# Patient Record
Sex: FEMALE | Race: WHITE | NOT HISPANIC OR LATINO | Employment: UNEMPLOYED | URBAN - METROPOLITAN AREA
[De-identification: names, ages, dates, MRNs, and addresses within clinical notes are randomized per-mention and may not be internally consistent; named-entity substitution may affect disease eponyms.]

---

## 2019-07-30 ENCOUNTER — APPOINTMENT (EMERGENCY)
Dept: RADIOLOGY | Facility: HOSPITAL | Age: 54
End: 2019-07-30
Payer: MEDICARE

## 2019-07-30 ENCOUNTER — HOSPITAL ENCOUNTER (EMERGENCY)
Facility: HOSPITAL | Age: 54
Discharge: HOME | End: 2019-07-30
Attending: EMERGENCY MEDICINE | Admitting: EMERGENCY MEDICINE
Payer: MEDICARE

## 2019-07-30 VITALS
WEIGHT: 253.09 LBS | SYSTOLIC BLOOD PRESSURE: 160 MMHG | RESPIRATION RATE: 18 BRPM | HEART RATE: 94 BPM | DIASTOLIC BLOOD PRESSURE: 70 MMHG | OXYGEN SATURATION: 96 % | TEMPERATURE: 98.1 F

## 2019-07-30 DIAGNOSIS — J20.9 ACUTE BRONCHITIS: Primary | ICD-10-CM

## 2019-07-30 DIAGNOSIS — J02.9 PHARYNGITIS: ICD-10-CM

## 2019-07-30 LAB — S PYO AG THROAT QL: NEGATIVE

## 2019-07-30 PROCEDURE — 71046 X-RAY EXAM CHEST 2 VIEWS: CPT

## 2019-07-30 PROCEDURE — 87430 STREP A AG IA: CPT | Performed by: PHYSICIAN ASSISTANT

## 2019-07-30 PROCEDURE — 99283 EMERGENCY DEPT VISIT LOW MDM: CPT | Performed by: PHYSICIAN ASSISTANT

## 2019-07-30 PROCEDURE — 99283 EMERGENCY DEPT VISIT LOW MDM: CPT

## 2019-07-30 RX ORDER — AZITHROMYCIN 250 MG/1
TABLET, FILM COATED ORAL
Qty: 6 TABLET | Refills: 0 | Status: SHIPPED | OUTPATIENT
Start: 2019-07-30 | End: 2019-08-03

## 2019-07-30 NOTE — ED PROVIDER NOTES
History  Chief Complaint   Patient presents with    Cough     Patient reports starting with a cough last night as well as sore throat, is concerned because she was around someone with pneumonia; reports congestion but denies CP/SOB  Patient is a 49 y/o female with a PMH of depression, anxiety, DM who presents for evaluation of cold symptoms  She states that her symptoms started yesterday  She complains of sore throat and cough  Cough is productive of sputum  She states she has also had some tightness and wheezing  She denies history of asthma or COPD but states that she is a smoker and her doctor has her on daily Flovent and Ventolin as needed  She states she did use her rescue inhaler last night which did provide relief of her symptoms  She states she is concerned because someone in her building recently had pneumonia  She denies any fever, chest pain, shortness breath, abdominal pain, vomiting, diarrhea, urinary complaints, nasal congestion or ear pain  She has never been hospitalized or intubated for any asthma or COPD complaints  Prior to Admission Medications   Prescriptions Last Dose Informant Patient Reported? Taking? ALPRAZolam (XANAX) 0 25 mg tablet   Yes No   Sig: Take 0 25 mg by mouth daily at bedtime as needed for anxiety  Biotin 300 MCG TABS   Yes No   Sig: Take by mouth  PARoxetine (PAXIL) 30 mg tablet   Yes No   Sig: Take 30 mg by mouth 2 (two) times a day  clonazePAM (KlonoPIN) 1 mg tablet   Yes No   Sig: Take 1 mg by mouth 2 (two) times a day as needed for seizures  metFORMIN (GLUCOPHAGE) 500 mg tablet   Yes No   Sig: Take 500 mg by mouth 2 (two) times a day with meals  Facility-Administered Medications: None       History reviewed  No pertinent past medical history  Past Surgical History:   Procedure Laterality Date     SECTION      CHOLECYSTECTOMY         History reviewed  No pertinent family history    I have reviewed and agree with the history as documented  Social History     Tobacco Use    Smoking status: Current Every Day Smoker     Packs/day: 1 00    Smokeless tobacco: Never Used   Substance Use Topics    Alcohol use: No    Drug use: No        Review of Systems   Constitutional: Positive for fatigue  Negative for chills and fever  HENT: Positive for sore throat  Negative for ear pain  Respiratory: Positive for cough and wheezing  Negative for shortness of breath  Cardiovascular: Negative for chest pain  Gastrointestinal: Negative for abdominal pain, diarrhea, nausea and vomiting  Genitourinary: Negative for dysuria  Musculoskeletal: Negative for back pain and neck pain  Skin: Negative for rash  All other systems reviewed and are negative  Physical Exam  Physical Exam   Constitutional: Vital signs are normal  She appears well-developed and well-nourished  She is active  Non-toxic appearance  HENT:   Head: Normocephalic and atraumatic  Right Ear: Hearing, tympanic membrane, external ear and ear canal normal    Left Ear: Hearing, tympanic membrane, external ear and ear canal normal    Nose: Nose normal  No mucosal edema or rhinorrhea  Mouth/Throat: Uvula is midline and mucous membranes are normal  No oral lesions  No trismus in the jaw  No uvula swelling  Posterior oropharyngeal erythema present  No oropharyngeal exudate, posterior oropharyngeal edema or tonsillar abscesses  No tonsillar exudate  No sign of PTA  Handles oral secretions well without difficulty  Eyes: Conjunctivae are normal    Neck: Normal range of motion  Neck supple  Cardiovascular: Normal rate, regular rhythm and normal heart sounds  Exam reveals no gallop and no friction rub  No murmur heard  Pulmonary/Chest: Effort normal and breath sounds normal  No stridor  No respiratory distress  She has no wheezes  Abdominal: Soft  Bowel sounds are normal  She exhibits no distension  There is no tenderness  There is no guarding     Musculoskeletal: Normal range of motion  Lymphadenopathy:     She has no cervical adenopathy  Neurological: She is alert  Skin: Skin is warm and dry  Psychiatric: She has a normal mood and affect  Her behavior is normal    Nursing note and vitals reviewed  Vital Signs  ED Triage Vitals [07/30/19 1340]   Temperature Pulse Respirations Blood Pressure SpO2   98 1 °F (36 7 °C) 94 18 160/70 96 %      Temp Source Heart Rate Source Patient Position - Orthostatic VS BP Location FiO2 (%)   Temporal Monitor Sitting Right arm --      Pain Score       No Pain           Vitals:    07/30/19 1340   BP: 160/70   Pulse: 94   Patient Position - Orthostatic VS: Sitting         Visual Acuity      ED Medications  Medications - No data to display    Diagnostic Studies  Results Reviewed     Procedure Component Value Units Date/Time    Rapid Strep A Screen Only, Adults [[id number]]  (Normal) Collected:  07/30/19 1413    Lab Status:  Final result Specimen:  Throat Updated:  07/30/19 1440     Rapid Strep A Screen Negative                 XR chest 2 views   Final Result by Fausto Charles MD (07/30 4578)      No acute cardiopulmonary disease  Workstation performed: [id number]                    Procedures  Procedures       ED Course                               MDM  Number of Diagnoses or Management Options  Acute bronchitis:   Pharyngitis:   Diagnosis management comments: Will check a rapid strep and chest x-ray  Rapid strep negative  Chest x-ray reviewed by me as no active disease  Discussed results with patient  Discussed supportive care  Continue with inhalers at home as directed  Will cover for acute exacerbation of bronchitis with [facility]  Follow up with her family doctor  Return to ED if symptoms worsen or new symptoms arise  Patient states understanding and agrees with plan         Amount and/or Complexity of Data Reviewed  Clinical lab tests: ordered and reviewed  Tests in the radiology section of CPT®: ordered and reviewed  Independent visualization of images, tracings, or specimens: yes    Patient Progress  Patient progress: stable      Disposition  Final diagnoses:   Acute bronchitis   Pharyngitis     Time reflects when diagnosis was documented in both MDM as applicable and the Disposition within this note     Time User Action Codes Description Comment    7/30/2019  2:52 PM Clay Smith Add [J20 9] Acute bronchitis     7/30/2019  2:52 PM Clay Smith Add [J02 9] Pharyngitis       ED Disposition     ED Disposition Condition Date/Time Comment    Discharge Stable Tue Jul 30, 2019  2:51 PM Latosha Aguilar discharge to home/self care  Follow-up Information     Follow up With Specialties Details Why Contact Info Additional Information    Demario Ly MD Family Medicine Schedule an appointment as soon as possible for a visit in 1 day   [address]  [address] Emergency Department Emergency Medicine  If symptoms worsen [facility] [id number]  [phone] [facility], [address], [place], [address]          Discharge Medication List as of 7/30/2019  2:53 PM      START taking these medications    Details   azithromycin (ZITHROMAX) 250 mg tablet Take 2(two) tablets on day 1(one) and take 1 tablet on day 2-5, Print         CONTINUE these medications which have NOT CHANGED    Details   ALPRAZolam (XANAX) 0 25 mg tablet Take 0 25 mg by mouth daily at bedtime as needed for anxiety  , Until Discontinued, Historical Med      Biotin 300 MCG TABS Take by mouth , Until Discontinued, Historical Med      clonazePAM (KlonoPIN) 1 mg tablet Take 1 mg by mouth 2 (two) times a day as needed for seizures  , Until Discontinued, Historical Med      metFORMIN (GLUCOPHAGE) 500 mg tablet Take 500 mg by mouth 2 (two) times a day with meals  , Until Discontinued, Historical Med      PARoxetine (PAXIL) 30 mg tablet Take 30 mg by mouth 2 (two) times a day , Until Discontinued, Historical Med           No discharge procedures on file      ED Provider  Electronically Signed by           Paige Lyle PA-C  07/30/19 6178

## 2020-05-25 ENCOUNTER — APPOINTMENT (EMERGENCY)
Dept: RADIOLOGY | Facility: HOSPITAL | Age: 55
End: 2020-05-25
Payer: MEDICARE

## 2020-05-25 ENCOUNTER — HOSPITAL ENCOUNTER (EMERGENCY)
Facility: HOSPITAL | Age: 55
Discharge: HOME | End: 2020-05-25
Attending: EMERGENCY MEDICINE | Admitting: EMERGENCY MEDICINE
Payer: MEDICARE

## 2020-05-25 VITALS
DIASTOLIC BLOOD PRESSURE: 69 MMHG | WEIGHT: 247.36 LBS | SYSTOLIC BLOOD PRESSURE: 165 MMHG | TEMPERATURE: 98.1 F | RESPIRATION RATE: 20 BRPM | HEART RATE: 98 BPM | OXYGEN SATURATION: 96 %

## 2020-05-25 DIAGNOSIS — M25.512 ACUTE PAIN OF LEFT SHOULDER: Primary | ICD-10-CM

## 2020-05-25 PROCEDURE — 99284 EMERGENCY DEPT VISIT MOD MDM: CPT | Performed by: PHYSICIAN ASSISTANT

## 2020-05-25 PROCEDURE — 99283 EMERGENCY DEPT VISIT LOW MDM: CPT

## 2020-05-25 PROCEDURE — 73000 X-RAY EXAM OF COLLAR BONE: CPT

## 2024-06-28 ENCOUNTER — HOSPITAL ENCOUNTER (EMERGENCY)
Facility: HOSPITAL | Age: 59
Discharge: HOME | End: 2024-06-29
Attending: EMERGENCY MEDICINE | Admitting: EMERGENCY MEDICINE
Payer: MEDICARE

## 2024-06-28 DIAGNOSIS — F22 PARANOIA (HCC): Primary | ICD-10-CM

## 2024-06-28 DIAGNOSIS — Z00.8 ENCOUNTER FOR PSYCHOLOGICAL EVALUATION: ICD-10-CM

## 2024-06-28 PROCEDURE — 99284 EMERGENCY DEPT VISIT MOD MDM: CPT

## 2024-06-29 VITALS
OXYGEN SATURATION: 100 % | TEMPERATURE: 97.6 F | SYSTOLIC BLOOD PRESSURE: 153 MMHG | HEART RATE: 103 BPM | RESPIRATION RATE: 18 BRPM | DIASTOLIC BLOOD PRESSURE: 70 MMHG

## 2024-06-29 PROCEDURE — 99284 EMERGENCY DEPT VISIT MOD MDM: CPT | Performed by: PHYSICIAN ASSISTANT
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
## 2024-07-01 ENCOUNTER — HOSPITAL ENCOUNTER (EMERGENCY)
Facility: HOSPITAL | Age: 59
DRG: 885 | End: 2024-07-02
Attending: EMERGENCY MEDICINE
Payer: MEDICARE

## 2024-07-01 ENCOUNTER — APPOINTMENT (EMERGENCY)
Dept: CT IMAGING | Facility: HOSPITAL | Age: 59
DRG: 885 | End: 2024-07-01
Payer: MEDICARE

## 2024-07-01 DIAGNOSIS — R03.0 ELEVATED BLOOD PRESSURE READING: ICD-10-CM

## 2024-07-01 DIAGNOSIS — Z00.8 MEDICAL CLEARANCE FOR PSYCHIATRIC ADMISSION: ICD-10-CM

## 2024-07-01 DIAGNOSIS — F22 PARANOIA (PSYCHOSIS) (HCC): Primary | ICD-10-CM

## 2024-07-01 LAB
ALBUMIN SERPL BCG-MCNC: 4.7 G/DL (ref 3.5–5)
ALP SERPL-CCNC: 110 U/L (ref 34–104)
ALT SERPL W P-5'-P-CCNC: 15 U/L (ref 7–52)
ANION GAP SERPL CALCULATED.3IONS-SCNC: 9 MMOL/L (ref 4–13)
APAP SERPL-MCNC: <2 UG/ML (ref 10–20)
APTT PPP: 34 SECONDS (ref 23–37)
AST SERPL W P-5'-P-CCNC: 19 U/L (ref 13–39)
BASOPHILS # BLD AUTO: 0.06 THOUSANDS/ÂΜL (ref 0–0.1)
BASOPHILS NFR BLD AUTO: 1 % (ref 0–1)
BILIRUB SERPL-MCNC: 0.49 MG/DL (ref 0.2–1)
BUN SERPL-MCNC: 11 MG/DL (ref 5–25)
CALCIUM SERPL-MCNC: 9.9 MG/DL (ref 8.4–10.2)
CHLORIDE SERPL-SCNC: 104 MMOL/L (ref 96–108)
CO2 SERPL-SCNC: 29 MMOL/L (ref 21–32)
CREAT SERPL-MCNC: 0.99 MG/DL (ref 0.6–1.3)
EOSINOPHIL # BLD AUTO: 0 THOUSAND/ÂΜL (ref 0–0.61)
EOSINOPHIL NFR BLD AUTO: 0 % (ref 0–6)
ERYTHROCYTE [DISTWIDTH] IN BLOOD BY AUTOMATED COUNT: 16.1 % (ref 11.6–15.1)
ETHANOL SERPL-MCNC: <10 MG/DL
GFR SERPL CREATININE-BSD FRML MDRD: 63 ML/MIN/1.73SQ M
GLUCOSE SERPL-MCNC: 93 MG/DL (ref 65–140)
HCT VFR BLD AUTO: 43.2 % (ref 34.8–46.1)
HGB BLD-MCNC: 13.8 G/DL (ref 11.5–15.4)
IMM GRANULOCYTES # BLD AUTO: 0.03 THOUSAND/UL (ref 0–0.2)
IMM GRANULOCYTES NFR BLD AUTO: 0 % (ref 0–2)
INR PPP: 1.04 (ref 0.84–1.19)
LYMPHOCYTES # BLD AUTO: 2.12 THOUSANDS/ÂΜL (ref 0.6–4.47)
LYMPHOCYTES NFR BLD AUTO: 22 % (ref 14–44)
MAGNESIUM SERPL-MCNC: 2.2 MG/DL (ref 1.9–2.7)
MCH RBC QN AUTO: 28.6 PG (ref 26.8–34.3)
MCHC RBC AUTO-ENTMCNC: 31.9 G/DL (ref 31.4–37.4)
MCV RBC AUTO: 89 FL (ref 82–98)
MONOCYTES # BLD AUTO: 0.9 THOUSAND/ÂΜL (ref 0.17–1.22)
MONOCYTES NFR BLD AUTO: 9 % (ref 4–12)
NEUTROPHILS # BLD AUTO: 6.71 THOUSANDS/ÂΜL (ref 1.85–7.62)
NEUTS SEG NFR BLD AUTO: 68 % (ref 43–75)
NRBC BLD AUTO-RTO: 0 /100 WBCS
PLATELET # BLD AUTO: 334 THOUSANDS/UL (ref 149–390)
PMV BLD AUTO: 9 FL (ref 8.9–12.7)
POTASSIUM SERPL-SCNC: 4.3 MMOL/L (ref 3.5–5.3)
PROT SERPL-MCNC: 7.4 G/DL (ref 6.4–8.4)
PROTHROMBIN TIME: 13.8 SECONDS (ref 11.6–14.5)
RBC # BLD AUTO: 4.83 MILLION/UL (ref 3.81–5.12)
SALICYLATES SERPL-MCNC: <5 MG/DL (ref 3–20)
SODIUM SERPL-SCNC: 142 MMOL/L (ref 135–147)
TSH SERPL DL<=0.05 MIU/L-ACNC: 0.95 UIU/ML (ref 0.45–4.5)
WBC # BLD AUTO: 9.82 THOUSAND/UL (ref 4.31–10.16)

## 2024-07-01 PROCEDURE — 83735 ASSAY OF MAGNESIUM: CPT | Performed by: EMERGENCY MEDICINE

## 2024-07-01 PROCEDURE — 36415 COLL VENOUS BLD VENIPUNCTURE: CPT | Performed by: EMERGENCY MEDICINE

## 2024-07-01 PROCEDURE — 70450 CT HEAD/BRAIN W/O DYE: CPT

## 2024-07-01 PROCEDURE — 84443 ASSAY THYROID STIM HORMONE: CPT | Performed by: EMERGENCY MEDICINE

## 2024-07-01 PROCEDURE — 85730 THROMBOPLASTIN TIME PARTIAL: CPT | Performed by: EMERGENCY MEDICINE

## 2024-07-01 PROCEDURE — 85025 COMPLETE CBC W/AUTO DIFF WBC: CPT | Performed by: EMERGENCY MEDICINE

## 2024-07-01 PROCEDURE — 80053 COMPREHEN METABOLIC PANEL: CPT | Performed by: EMERGENCY MEDICINE

## 2024-07-01 PROCEDURE — 80143 DRUG ASSAY ACETAMINOPHEN: CPT | Performed by: EMERGENCY MEDICINE

## 2024-07-01 PROCEDURE — 85610 PROTHROMBIN TIME: CPT | Performed by: EMERGENCY MEDICINE

## 2024-07-01 PROCEDURE — 99285 EMERGENCY DEPT VISIT HI MDM: CPT | Performed by: EMERGENCY MEDICINE

## 2024-07-01 PROCEDURE — 82077 ASSAY SPEC XCP UR&BREATH IA: CPT | Performed by: EMERGENCY MEDICINE

## 2024-07-01 PROCEDURE — 80179 DRUG ASSAY SALICYLATE: CPT | Performed by: EMERGENCY MEDICINE

## 2024-07-01 RX ORDER — ACETAMINOPHEN 325 MG/1
650 TABLET ORAL EVERY 8 HOURS PRN
Status: DISCONTINUED | OUTPATIENT
Start: 2024-07-01 | End: 2024-07-02

## 2024-07-01 RX ORDER — HALOPERIDOL 5 MG/ML
5 INJECTION INTRAMUSCULAR EVERY 6 HOURS PRN
Status: DISCONTINUED | OUTPATIENT
Start: 2024-07-01 | End: 2024-07-02

## 2024-07-01 RX ORDER — HALOPERIDOL 5 MG/ML
5 INJECTION INTRAMUSCULAR ONCE
Status: COMPLETED | OUTPATIENT
Start: 2024-07-01 | End: 2024-07-01

## 2024-07-01 RX ORDER — LANOLIN ALCOHOL/MO/W.PET/CERES
3 CREAM (GRAM) TOPICAL
Status: DISCONTINUED | OUTPATIENT
Start: 2024-07-01 | End: 2024-07-02 | Stop reason: HOSPADM

## 2024-07-01 RX ORDER — LORAZEPAM 2 MG/ML
2 INJECTION INTRAMUSCULAR ONCE
Status: COMPLETED | OUTPATIENT
Start: 2024-07-01 | End: 2024-07-01

## 2024-07-01 RX ORDER — LORAZEPAM 2 MG/ML
2 INJECTION INTRAMUSCULAR EVERY 6 HOURS PRN
Status: DISCONTINUED | OUTPATIENT
Start: 2024-07-01 | End: 2024-07-02 | Stop reason: HOSPADM

## 2024-07-01 RX ORDER — LORAZEPAM 1 MG/1
2 TABLET ORAL EVERY 6 HOURS PRN
Status: DISCONTINUED | OUTPATIENT
Start: 2024-07-01 | End: 2024-07-02 | Stop reason: HOSPADM

## 2024-07-01 RX ORDER — HALOPERIDOL 5 MG/1
5 TABLET ORAL EVERY 6 HOURS PRN
Status: DISCONTINUED | OUTPATIENT
Start: 2024-07-01 | End: 2024-07-02

## 2024-07-01 RX ORDER — BENZTROPINE MESYLATE 1 MG/ML
2 INJECTION INTRAMUSCULAR; INTRAVENOUS 2 TIMES DAILY PRN
Status: DISCONTINUED | OUTPATIENT
Start: 2024-07-01 | End: 2024-07-02

## 2024-07-01 RX ORDER — IBUPROFEN 400 MG/1
400 TABLET ORAL EVERY 8 HOURS PRN
Status: DISCONTINUED | OUTPATIENT
Start: 2024-07-01 | End: 2024-07-02

## 2024-07-01 RX ORDER — HALOPERIDOL 5 MG/ML
5 INJECTION INTRAMUSCULAR ONCE
Status: DISCONTINUED | OUTPATIENT
Start: 2024-07-01 | End: 2024-07-01

## 2024-07-01 RX ORDER — ALBUTEROL SULFATE 90 UG/1
2 AEROSOL, METERED RESPIRATORY (INHALATION) EVERY 6 HOURS PRN
Status: DISCONTINUED | OUTPATIENT
Start: 2024-07-01 | End: 2024-07-02 | Stop reason: HOSPADM

## 2024-07-01 RX ADMIN — LORAZEPAM 2 MG: 2 INJECTION INTRAMUSCULAR; INTRAVENOUS at 20:18

## 2024-07-01 RX ADMIN — HALOPERIDOL LACTATE 5 MG: 5 INJECTION, SOLUTION INTRAMUSCULAR at 20:17

## 2024-07-01 RX ADMIN — SODIUM CHLORIDE 1000 ML: 0.9 INJECTION, SOLUTION INTRAVENOUS at 20:51

## 2024-07-01 NOTE — ED PROVIDER NOTES
"History  Chief Complaint   Patient presents with   • Psychiatric Evaluation     Pt arriving with apd after apd reports pt was sitting on a bench looking \"very paranoid and making no sense\". Pt not answering any questions in triage just saying \"I'm scared\" but will not elaborate and keeps looking off to the side.     Patient is a 59-year-old female coming in via police stating \"paranoid and making no sense\".  Police were not available upon my evaluation patient.  Patient resting in bed keeps stating that she is scared or \"I just was brought here to sleep.  Can you turn the AC on\".  Patient repeats these phrases several times.  Patient tells us that she lives in an apartment with a cat.  She remembers sitting out on a bench smoking 2 cigarettes around 330.  Patient cannot tell us much else at this time as she keeps stating that she is here to rest.  She does repeat several times that she is scared.  Upon further questioning patient does not answer.      Chart review shows patient was seen here on June 28 and patient has a history of agoraphobia with panic disorder, schizoaffective disorder, diabetes.  When I asked her about her diabetes she states \"I feel my face and outfield diabetic\".  I asked her about other medication and states that she takes tablets but cannot tell me how much or when.  She cannot tell me the last time she took this.    Patient is very padded and states that she is here to sleep and very tired that no one understands.          History provided by:  Medical records, patient and police  History limited by:  Mental status change    Prior to Admission Medications   Prescriptions Last Dose Informant Patient Reported? Taking?   ALPRAZolam (XANAX) 0.25 mg tablet   Yes No   Sig: Take 0.25 mg by mouth daily at bedtime as needed for anxiety.   Biotin 300 MCG TABS   Yes No   Sig: Take by mouth.   Capsaicin-Menthol 0.025-10 % GEL   No No   Sig: Apply 1 small application topically 3 (three) times a day as " needed (pain)   PARoxetine (PAXIL) 30 mg tablet   Yes No   Sig: Take 30 mg by mouth 2 (two) times a day.   clonazePAM (KlonoPIN) 1 mg tablet   Yes No   Sig: Take 1 mg by mouth 2 (two) times a day as needed for seizures.   metFORMIN (GLUCOPHAGE) 500 mg tablet   Yes No   Sig: Take 500 mg by mouth 2 (two) times a day with meals.      Facility-Administered Medications: None       Past Medical History:   Diagnosis Date   • Diabetes mellitus (HCC)        Past Surgical History:   Procedure Laterality Date   •  SECTION     • CHOLECYSTECTOMY         History reviewed. No pertinent family history.  I have reviewed and agree with the history as documented.    E-Cigarette/Vaping   • E-Cigarette Use Never User      E-Cigarette/Vaping Substances     Social History     Tobacco Use   • Smoking status: Every Day     Current packs/day: 0.50     Types: Cigarettes   • Smokeless tobacco: Never   Vaping Use   • Vaping status: Never Used   Substance Use Topics   • Alcohol use: No   • Drug use: No       Review of Systems   Unable to perform ROS: Mental status change       Physical Exam  Physical Exam  Vitals and nursing note reviewed.   Constitutional:       General: She is not in acute distress.     Appearance: She is well-developed. She is obese. She is not ill-appearing.      Comments: Patient appears older then stated age. Dishevel appearing    HENT:      Head: Normocephalic and atraumatic.      Right Ear: External ear normal.      Left Ear: External ear normal.   Eyes:      General: Lids are normal. Vision grossly intact.      Extraocular Movements: Extraocular movements intact.      Conjunctiva/sclera: Conjunctivae normal.      Pupils: Pupils are equal, round, and reactive to light.      Comments: Strabismus noted   Cardiovascular:      Rate and Rhythm: Regular rhythm. Tachycardia present.      Heart sounds: Normal heart sounds, S1 normal and S2 normal. No murmur heard.  Pulmonary:      Effort: Pulmonary effort is normal. No  respiratory distress.      Breath sounds: Normal breath sounds.      Comments: No conversational dyspnea  Abdominal:      General: Bowel sounds are normal.      Palpations: Abdomen is soft.      Tenderness: There is no abdominal tenderness.   Musculoskeletal:         General: No swelling.      Cervical back: Normal range of motion and neck supple.      Right lower leg: No edema.      Left lower leg: No edema.   Lymphadenopathy:      Cervical: No cervical adenopathy.   Skin:     General: Skin is warm and dry.      Capillary Refill: Capillary refill takes less than 2 seconds.   Neurological:      General: No focal deficit present.      Mental Status: She is alert.      GCS: GCS eye subscore is 4. GCS verbal subscore is 4. GCS motor subscore is 6.      Cranial Nerves: Cranial nerves 2-12 are intact.      Comments: No slurred speech, no facial asymmetry, no tongue deviation   Psychiatric:         Attention and Perception: She perceives auditory hallucinations.         Speech: Speech is delayed and tangential.         Behavior: Behavior is withdrawn. Behavior is cooperative.         Thought Content: Thought content is paranoid.         Cognition and Memory: Cognition is impaired. Memory is impaired.      Comments: Patient with rapid eye movement poor eye contact         Vital Signs  ED Triage Vitals [07/01/24 1833]   Temperature Pulse Respirations Blood Pressure SpO2   99.4 °F (37.4 °C) (!) 107 20 (!) 184/84 96 %      Temp Source Heart Rate Source Patient Position - Orthostatic VS BP Location FiO2 (%)   Axillary Monitor Lying Left arm --      Pain Score       --           Vitals:    07/01/24 1833 07/01/24 2134   BP: (!) 184/84 (!) 174/109   Pulse: (!) 107 98   Patient Position - Orthostatic VS: Lying          Visual Acuity      ED Medications  Medications   haloperidol (HALDOL) tablet 5 mg (has no administration in time range)   haloperidol lactate (HALDOL) injection 5 mg (has no administration in time range)    benztropine (COGENTIN) injection 2 mg (has no administration in time range)   LORazepam (ATIVAN) injection 2 mg (has no administration in time range)   LORazepam (ATIVAN) tablet 2 mg (has no administration in time range)   acetaminophen (TYLENOL) tablet 650 mg (has no administration in time range)   ibuprofen (MOTRIN) tablet 400 mg (has no administration in time range)   melatonin tablet 3 mg (has no administration in time range)   albuterol (PROVENTIL HFA,VENTOLIN HFA) inhaler 2 puff (has no administration in time range)   sodium chloride 0.9 % bolus 1,000 mL (1,000 mL Intravenous New Bag 7/2/24 0025)   sodium chloride 0.9 % bolus 1,000 mL (0 mL Intravenous Stopped 7/2/24 0026)   LORazepam (ATIVAN) injection 2 mg (2 mg Intramuscular Given 7/1/24 2018)   haloperidol lactate (HALDOL) injection 5 mg (5 mg Intramuscular Given 7/1/24 2017)       Diagnostic Studies  Results Reviewed       Procedure Component Value Units Date/Time    UA (URINE) with reflex to Scope [[id number]]  (Abnormal) Collected: 07/02/24 0025    Lab Status: Final result Specimen: Urine, Clean Catch Updated: 07/02/24 0035     Color, UA Yellow     Clarity, UA Turbid     Specific Gravity, UA 1.021     pH, UA 7.5     Leukocytes, UA Negative     Nitrite, UA Negative     Protein, UA 70 (1+) mg/dl      Glucose, UA Negative mg/dl      Ketones, UA 20 (1+) mg/dl      Urobilinogen, UA 2.0 mg/dl      Bilirubin, UA Small     Occult Blood, UA Negative    Urine Microscopic [[id number]] Collected: 07/02/24 0025    Lab Status: In process Specimen: Urine, Clean Catch Updated: 07/02/24 0035    Rapid drug screen, urine [[id number]] Collected: 07/02/24 0025    Lab Status: No result Specimen: Urine, Clean Catch     TSH [[id number]]  (Normal) Collected: 07/01/24 2049    Lab Status: Final result Specimen: Blood from Arm, Right Updated: 07/01/24 2226     TSH 3RD GENERATON 0.952 uIU/mL     Comprehensive metabolic panel [[id number]]  (Abnormal) Collected: 07/01/24 2049    Lab  Status: Final result Specimen: Blood from Hand, Right Updated: 07/01/24 2123     Sodium 142 mmol/L      Potassium 4.3 mmol/L      Chloride 104 mmol/L      CO2 29 mmol/L      ANION GAP 9 mmol/L      BUN 11 mg/dL      Creatinine 0.99 mg/dL      Glucose 93 mg/dL      Calcium 9.9 mg/dL      AST 19 U/L      ALT 15 U/L      Alkaline Phosphatase 110 U/L      Total Protein 7.4 g/dL      Albumin 4.7 g/dL      Total Bilirubin 0.49 mg/dL      eGFR 63 ml/min/1.73sq m     Narrative:      National Kid[employer] guidelines for Chronic Kidney Disease (CKD):   •  Stage 1 with normal or high GFR (GFR > 90 mL/min/1.73 square meters)  •  Stage 2 Mild CKD (GFR = 60-89 mL/min/1.73 square meters)  •  Stage 3A Moderate CKD (GFR = 45-59 mL/min/1.73 square meters)  •  Stage 3B Moderate CKD (GFR = 30-44 mL/min/1.73 square meters)  •  Stage 4 Severe CKD (GFR = 15-29 mL/min/1.73 square meters)  •  Stage 5 End Stage CKD (GFR <15 mL/min/1.73 square meters)  Note: GFR calculation is accurate only with a steady state creatinine    Magnesium [[id number]]  (Normal) Collected: 07/01/24 2049    Lab Status: Final result Specimen: Blood from Hand, Right Updated: 07/01/24 2123     Magnesium 2.2 mg/dL     Salicylate level [[id number]]  (Normal) Collected: 07/01/24 2049    Lab Status: Final result Specimen: Blood from Hand, Right Updated: 07/01/24 2123     Salicylate Lvl <5 mg/dL     Acetaminophen level-If concentration is detectable, please discuss with medical  on call. [[id number]]  (Abnormal) Collected: 07/01/24 2049    Lab Status: Final result Specimen: Blood from Hand, Right Updated: 07/01/24 2123     Acetaminophen Level <2 ug/mL     Ethanol [[id number]]  (Normal) Collected: 07/01/24 2049    Lab Status: Final result Specimen: Blood from Arm, Right Updated: 07/01/24 2120     Ethanol Lvl <10 mg/dL     Protime-INR [[id number]]  (Normal) Collected: 07/01/24 2049    Lab Status: Final result Specimen: Blood from Arm, Right Updated:  "07/01/24 2116     Protime 13.8 seconds      INR 1.04    APTT [[id number]]  (Normal) Collected: 07/01/24 2049    Lab Status: Final result Specimen: Blood from Arm, Right Updated: 07/01/24 2116     PTT 34 seconds     CBC and differential [[id number]]  (Abnormal) Collected: 07/01/24 2049    Lab Status: Final result Specimen: Blood from Arm, Right Updated: 07/01/24 2102     WBC 9.82 Thousand/uL      RBC 4.83 Million/uL      Hemoglobin 13.8 g/dL      Hematocrit 43.2 %      MCV 89 fL      MCH 28.6 pg      MCHC 31.9 g/dL      RDW 16.1 %      MPV 9.0 fL      Platelets 334 Thousands/uL      nRBC 0 /100 WBCs      Segmented % 68 %      Immature Grans % 0 %      Lymphocytes % 22 %      Monocytes % 9 %      Eosinophils Relative 0 %      Basophils Relative 1 %      Absolute Neutrophils 6.71 Thousands/µL      Absolute Immature Grans 0.03 Thousand/uL      Absolute Lymphocytes 2.12 Thousands/µL      Absolute Monocytes 0.90 Thousand/µL      Eosinophils Absolute 0.00 Thousand/µL      Basophils Absolute 0.06 Thousands/µL                    CT head without contrast   Final Result by Bairon Carey MD (07/01 2247)      No acute intracranial abnormality.                  Workstation performed: [id number]                    Procedures  Procedures         ED Course  ED Course as of 07/02/24 0116   Mon Jul 01, 2024   1902 Patient refused BAT    Disclosure: Voice to text software was used in the preparation of this document and could have resulted in translational errors.      Occasional wrong word or \"sound a like\" substitutions may have occurred due to the inherent limitations of voice recognition software.  Read the chart carefully and recognize, using context, where substitutions have occurred.       I have independently reviewed external records are available to me to the level of detail possible within the time constraints of my patient care responsibilities in the ED.       1952 Patient is a 59-year-old female brought in by EMS and " seen with crisis at bedside.  Patient with tangential thoughts however does appear to have internal stimuli.  Patient was here several days ago and alert and oriented.  Given altered status will start medical workup       2007 Patient states that she just wants to sleep that is why she came into the emergency department.  Given altered mental status will give Haldol and Ativan to help further evaluate patient from a medical standpoint       2129 Labs reviewed and without actionable derangement    Pending UA and CT    Patient resting in bed in no distress.  Patient more calm after medication       2250 CT Head without acute findings    Will reassess patient       2255 Patient resting in bed.  Patient still remains in a curled position stating that she is still very scared.  When I asked her specifically what she is scared of she does not answer.  She denies any SI or HI.  However, when I asked her if she is hearing any other voices besides mine she closes her eyes and does not answer.  After several minutes I repeated the question and receive the same response.  Encourage urine       Tue Jul 02, 2024   0014 Patient's name placed in [facility] queue. Pending.     Concern for untreated or progressively worsening schizoaffective disorder.       0027 Patient was able to give us urine sample.         0037 Resting in bed.  Patient's leukocytes and nitrites are negative.  Noted ketones and protein.  No acute medical findings to explain patient's symptoms and continue to be concern for acute psychotic break.    No documented blood pressure medications or diagnoses.  Pending psychiatry eval       0045 Patient without any evidence of endorgan damage.  No evidence of urine infection.  Pending microscopic urine drug screen however I do feel is beneficial for patient to be admitted.  201 versus 302       0050 Crisis to evaluate patient   0110 Crisis met with patient and agreed that we will continue with psychiatry eval.  Encouraged  201 however will uphold 302 as patient lacks capacity has poor insight into her actions as well as poses a danger to self and others    Signed out to Dr Rausch                                       Medical Decision Making      Differential diagnosis includes but not limited to:  Intracranial hemorrhage, intracranial tumor, seizures, hypertensive encephalopathy, hypoglycemia, DKA, HHNK, dementia, Frank's paralysis, hypoglycemia/hyperglycemia, hypocalcemia/hypercalcemia, hypothermia, hyperthermia, hypoxia, hypercarbia, overdose (opioids, barbiturates, alcohol, benzodiazepines, Etc), meningitis, abscess, encephalitis, complicated migraine, shock, uremia    On exam patient has rapid eye movement and is repetitive as well as appears confused.  Axillary temp 99.4 she refused oral temperature.  Concern for acute psychosis versus acute infectious pathology.    Amount and/or Complexity of Data Reviewed  External Data Reviewed: notes.     Details:   Noted patient was seen by podiatry, OB/GYN and dental. Patient is diabetic and history of chart review shows endometrial CA s/p Hysterectomy.       Labs: ordered. Decision-making details documented in ED Course.     Details: No anemia, thrombocytopenia or leukocytosis  Coags within normal range  Coma panel negative   No acute kidney injury or electrolyte dysfunction  Glucose 93 with no anion gap acidosis  TSH within normal limits  No leukocytes or nitrites on urine  Radiology: ordered. Decision-making details documented in ED Course.     Details: CT head interpreted by radiologist as no acute findings    Risk  OTC drugs.  Prescription drug management.             Disposition  Final diagnoses:   Paranoia (psychosis) (HCC)   Elevated blood pressure reading     Time reflects when diagnosis was documented in both MDM as applicable and the Disposition within this note       Time User Action Codes Description Comment    7/1/2024 11:05 PM Mercedes Wong Add [F22] Paranoia (psychosis)  (HCC)     7/2/2024 12:46 AM Mercedes Wong Add [I10] HTN (hypertension)     7/2/2024 12:51 AM Mercedes Wong Remove [I10] HTN (hypertension)     7/2/2024 12:51 AM Mercedes Wong Add [R03.0] Elevated blood pressure reading           ED Disposition       None          Follow-up Information    None         Patient's Medications   Discharge Prescriptions    No medications on file       No discharge procedures on file.    PDMP Review       None            ED Provider  Electronically Signed by             Mercedes Wong DO  07/02/24 0116

## 2024-07-01 NOTE — ED NOTES
"Patient arrives via [employer].    [facility] met with patient to attempt to complete Crisis Intake and Safety Risk Assessment.     Patient is not oriented to person, place, time or situation. Patient unable to identify where she lives, the city she is in, how or why she was brought to the hospital. Patient does not appear to be at baseline based on chart review.    Patient is exhibiting rapid eye movement and appears to be responding to internal stimuli.    Patient continues to repeat that she is tired, and attempts to go to sleep. Patient is able to be awaken. Patient is unable to identify why she was brought to the hospital. Patient reports she was \"sitting on the bench at 3:30\", but does not continue the thought. Patient reports her cat is at home and needs fresh water. Patient reports having an ICM, but does not identify who this person is or where they work.    Patient does deny suicidal ideation, homicidal ideation and auditory/visual/tactile hallucinations.    EDDO to order labs, CIS to follow up.    Wilner Wang  Crisis Intervention Specialist II  07/01/24   "

## 2024-07-01 NOTE — ED NOTES
Pt moved to ed 24. Room broken down for behavioral health pt     Kristi Callejas RN  07/01/24 6701     chest pain

## 2024-07-02 ENCOUNTER — TELEPHONE (OUTPATIENT)
Dept: PSYCHIATRY | Facility: CLINIC | Age: 59
End: 2024-07-02

## 2024-07-02 ENCOUNTER — HOSPITAL ENCOUNTER (INPATIENT)
Facility: HOSPITAL | Age: 59
LOS: 1 days | DRG: 885 | End: 2024-07-03
Attending: STUDENT IN AN ORGANIZED HEALTH CARE EDUCATION/TRAINING PROGRAM | Admitting: PSYCHIATRY & NEUROLOGY
Payer: MEDICARE

## 2024-07-02 VITALS
SYSTOLIC BLOOD PRESSURE: 156 MMHG | HEART RATE: 92 BPM | DIASTOLIC BLOOD PRESSURE: 70 MMHG | TEMPERATURE: 99.4 F | OXYGEN SATURATION: 99 % | RESPIRATION RATE: 17 BRPM

## 2024-07-02 DIAGNOSIS — Z00.8 MEDICAL CLEARANCE FOR PSYCHIATRIC ADMISSION: ICD-10-CM

## 2024-07-02 LAB
AMPHETAMINES SERPL QL SCN: NEGATIVE
BACTERIA UR QL AUTO: ABNORMAL /HPF
BARBITURATES UR QL: NEGATIVE
BENZODIAZ UR QL: POSITIVE
BILIRUB UR QL STRIP: ABNORMAL
CLARITY UR: ABNORMAL
CLOZAPINE SERPL-MCNC: 134 NG/ML (ref 350–900)
COCAINE UR QL: NEGATIVE
COLOR UR: YELLOW
FENTANYL UR QL SCN: NEGATIVE
GLUCOSE SERPL-MCNC: 116 MG/DL (ref 65–140)
GLUCOSE SERPL-MCNC: 88 MG/DL (ref 65–140)
GLUCOSE UR STRIP-MCNC: NEGATIVE MG/DL
HGB UR QL STRIP.AUTO: NEGATIVE
HYALINE CASTS #/AREA URNS LPF: ABNORMAL /LPF
HYDROCODONE UR QL SCN: NEGATIVE
KETONES UR STRIP-MCNC: ABNORMAL MG/DL
LEUKOCYTE ESTERASE UR QL STRIP: NEGATIVE
METHADONE UR QL: NEGATIVE
MUCOUS THREADS UR QL AUTO: ABNORMAL
NITRITE UR QL STRIP: NEGATIVE
NON-SQ EPI CELLS URNS QL MICRO: ABNORMAL /HPF
OPIATES UR QL SCN: NEGATIVE
OXYCODONE+OXYMORPHONE UR QL SCN: NEGATIVE
PCP UR QL: NEGATIVE
PH UR STRIP.AUTO: 7.5 [PH]
PROT UR STRIP-MCNC: ABNORMAL MG/DL
RBC #/AREA URNS AUTO: ABNORMAL /HPF
SP GR UR STRIP.AUTO: 1.02 (ref 1–1.03)
THC UR QL: NEGATIVE
UROBILINOGEN UR STRIP-ACNC: 2 MG/DL
WBC #/AREA URNS AUTO: ABNORMAL /HPF

## 2024-07-02 PROCEDURE — 82948 REAGENT STRIP/BLOOD GLUCOSE: CPT

## 2024-07-02 PROCEDURE — 80159 DRUG ASSAY CLOZAPINE: CPT

## 2024-07-02 PROCEDURE — 80307 DRUG TEST PRSMV CHEM ANLYZR: CPT | Performed by: EMERGENCY MEDICINE

## 2024-07-02 PROCEDURE — G0427 INPT/ED TELECONSULT70: HCPCS | Performed by: PSYCHIATRY & NEUROLOGY

## 2024-07-02 PROCEDURE — 81001 URINALYSIS AUTO W/SCOPE: CPT | Performed by: EMERGENCY MEDICINE

## 2024-07-02 RX ORDER — HYDROXYZINE HYDROCHLORIDE 25 MG/1
25 TABLET, FILM COATED ORAL
Status: CANCELLED | OUTPATIENT
Start: 2024-07-02

## 2024-07-02 RX ORDER — POLYETHYLENE GLYCOL 3350 17 G/17G
17 POWDER, FOR SOLUTION ORAL DAILY PRN
Status: DISCONTINUED | OUTPATIENT
Start: 2024-07-02 | End: 2024-07-03 | Stop reason: HOSPADM

## 2024-07-02 RX ORDER — LANOLIN ALCOHOL/MO/W.PET/CERES
3 CREAM (GRAM) TOPICAL
Status: DISCONTINUED | OUTPATIENT
Start: 2024-07-02 | End: 2024-07-03 | Stop reason: HOSPADM

## 2024-07-02 RX ORDER — OLANZAPINE 10 MG/2ML
5 INJECTION, POWDER, FOR SOLUTION INTRAMUSCULAR
Status: CANCELLED | OUTPATIENT
Start: 2024-07-02

## 2024-07-02 RX ORDER — POLYETHYLENE GLYCOL 3350 17 G/17G
17 POWDER, FOR SOLUTION ORAL DAILY PRN
Status: CANCELLED | OUTPATIENT
Start: 2024-07-02

## 2024-07-02 RX ORDER — OLANZAPINE 5 MG/1
5 TABLET ORAL
Status: DISCONTINUED | OUTPATIENT
Start: 2024-07-02 | End: 2024-07-03 | Stop reason: HOSPADM

## 2024-07-02 RX ORDER — OLANZAPINE 5 MG/1
5 TABLET ORAL
Status: CANCELLED | OUTPATIENT
Start: 2024-07-02

## 2024-07-02 RX ORDER — HYDROXYZINE HYDROCHLORIDE 25 MG/1
50 TABLET, FILM COATED ORAL
Status: CANCELLED | OUTPATIENT
Start: 2024-07-02

## 2024-07-02 RX ORDER — ACETAMINOPHEN 325 MG/1
650 TABLET ORAL EVERY 4 HOURS PRN
Status: CANCELLED | OUTPATIENT
Start: 2024-07-02

## 2024-07-02 RX ORDER — OLANZAPINE 5 MG/1
5 TABLET ORAL EVERY 4 HOURS PRN
Status: DISCONTINUED | OUTPATIENT
Start: 2024-07-02 | End: 2024-07-02 | Stop reason: HOSPADM

## 2024-07-02 RX ORDER — LORAZEPAM 2 MG/ML
1 INJECTION INTRAMUSCULAR
Status: DISCONTINUED | OUTPATIENT
Start: 2024-07-02 | End: 2024-07-03 | Stop reason: HOSPADM

## 2024-07-02 RX ORDER — LANOLIN ALCOHOL/MO/W.PET/CERES
3 CREAM (GRAM) TOPICAL
Status: CANCELLED | OUTPATIENT
Start: 2024-07-02

## 2024-07-02 RX ORDER — LORAZEPAM 2 MG/ML
1 INJECTION INTRAMUSCULAR
Status: CANCELLED | OUTPATIENT
Start: 2024-07-02

## 2024-07-02 RX ORDER — HYDROXYZINE HYDROCHLORIDE 25 MG/1
25 TABLET, FILM COATED ORAL
Status: DISCONTINUED | OUTPATIENT
Start: 2024-07-02 | End: 2024-07-03 | Stop reason: HOSPADM

## 2024-07-02 RX ORDER — OLANZAPINE 2.5 MG/1
2.5 TABLET, FILM COATED ORAL
Status: DISCONTINUED | OUTPATIENT
Start: 2024-07-02 | End: 2024-07-03 | Stop reason: HOSPADM

## 2024-07-02 RX ORDER — IBUPROFEN 400 MG/1
400 TABLET ORAL EVERY 8 HOURS PRN
Status: DISCONTINUED | OUTPATIENT
Start: 2024-07-02 | End: 2024-07-02 | Stop reason: HOSPADM

## 2024-07-02 RX ORDER — LORAZEPAM 1 MG/1
1 TABLET ORAL
Status: CANCELLED | OUTPATIENT
Start: 2024-07-02

## 2024-07-02 RX ORDER — OLANZAPINE 10 MG/2ML
5 INJECTION, POWDER, FOR SOLUTION INTRAMUSCULAR
Status: DISCONTINUED | OUTPATIENT
Start: 2024-07-02 | End: 2024-07-03 | Stop reason: HOSPADM

## 2024-07-02 RX ORDER — ACETAMINOPHEN 325 MG/1
975 TABLET ORAL EVERY 6 HOURS PRN
Status: CANCELLED | OUTPATIENT
Start: 2024-07-02

## 2024-07-02 RX ORDER — ALPRAZOLAM 0.5 MG/1
1 TABLET ORAL 3 TIMES DAILY PRN
Status: DISCONTINUED | OUTPATIENT
Start: 2024-07-02 | End: 2024-07-03 | Stop reason: HOSPADM

## 2024-07-02 RX ORDER — ALPRAZOLAM 0.5 MG/1
1 TABLET ORAL 3 TIMES DAILY PRN
Status: CANCELLED | OUTPATIENT
Start: 2024-07-02

## 2024-07-02 RX ORDER — OLANZAPINE 5 MG/1
5 TABLET ORAL DAILY
Status: DISCONTINUED | OUTPATIENT
Start: 2024-07-03 | End: 2024-07-03

## 2024-07-02 RX ORDER — AMOXICILLIN 250 MG
1 CAPSULE ORAL DAILY PRN
Status: CANCELLED | OUTPATIENT
Start: 2024-07-02

## 2024-07-02 RX ORDER — OLANZAPINE 5 MG/1
5 TABLET ORAL DAILY
Status: DISCONTINUED | OUTPATIENT
Start: 2024-07-02 | End: 2024-07-02 | Stop reason: HOSPADM

## 2024-07-02 RX ORDER — OLANZAPINE 10 MG/2ML
5 INJECTION, POWDER, FOR SOLUTION INTRAMUSCULAR EVERY 4 HOURS PRN
Status: DISCONTINUED | OUTPATIENT
Start: 2024-07-02 | End: 2024-07-02 | Stop reason: HOSPADM

## 2024-07-02 RX ORDER — ACETAMINOPHEN 325 MG/1
975 TABLET ORAL EVERY 6 HOURS PRN
Status: DISCONTINUED | OUTPATIENT
Start: 2024-07-02 | End: 2024-07-03 | Stop reason: HOSPADM

## 2024-07-02 RX ORDER — OLANZAPINE 5 MG/1
5 TABLET ORAL DAILY
Status: CANCELLED | OUTPATIENT
Start: 2024-07-03

## 2024-07-02 RX ORDER — TRAZODONE HYDROCHLORIDE 50 MG/1
50 TABLET ORAL
Status: CANCELLED | OUTPATIENT
Start: 2024-07-02

## 2024-07-02 RX ORDER — TRAZODONE HYDROCHLORIDE 50 MG/1
50 TABLET ORAL
Status: DISCONTINUED | OUTPATIENT
Start: 2024-07-02 | End: 2024-07-03 | Stop reason: HOSPADM

## 2024-07-02 RX ORDER — LORAZEPAM 1 MG/1
1 TABLET ORAL
Status: DISCONTINUED | OUTPATIENT
Start: 2024-07-02 | End: 2024-07-03 | Stop reason: HOSPADM

## 2024-07-02 RX ORDER — ACETAMINOPHEN 325 MG/1
650 TABLET ORAL EVERY 8 HOURS PRN
Status: DISCONTINUED | OUTPATIENT
Start: 2024-07-02 | End: 2024-07-02 | Stop reason: HOSPADM

## 2024-07-02 RX ORDER — ACETAMINOPHEN 325 MG/1
650 TABLET ORAL EVERY 4 HOURS PRN
Status: DISCONTINUED | OUTPATIENT
Start: 2024-07-02 | End: 2024-07-03 | Stop reason: HOSPADM

## 2024-07-02 RX ORDER — OLANZAPINE 5 MG/1
2.5 TABLET ORAL
Status: CANCELLED | OUTPATIENT
Start: 2024-07-02

## 2024-07-02 RX ORDER — HYDROXYZINE 50 MG/1
50 TABLET, FILM COATED ORAL
Status: DISCONTINUED | OUTPATIENT
Start: 2024-07-02 | End: 2024-07-03 | Stop reason: HOSPADM

## 2024-07-02 RX ORDER — AMOXICILLIN 250 MG
1 CAPSULE ORAL DAILY PRN
Status: DISCONTINUED | OUTPATIENT
Start: 2024-07-02 | End: 2024-07-03 | Stop reason: HOSPADM

## 2024-07-02 RX ADMIN — HALOPERIDOL LACTATE 5 MG: 5 INJECTION, SOLUTION INTRAMUSCULAR at 07:52

## 2024-07-02 RX ADMIN — SODIUM CHLORIDE 1000 ML: 0.9 INJECTION, SOLUTION INTRAVENOUS at 00:25

## 2024-07-02 RX ADMIN — OLANZAPINE 5 MG: 10 INJECTION, POWDER, FOR SOLUTION INTRAMUSCULAR at 13:42

## 2024-07-02 NOTE — TELEMEDICINE
TeleConsultation - Behavioral Health   Meli Nowak 59 y.o. female MRN: [id number]  Unit/Bed#: ED-24 Encounter: [id number]      VIRTUAL CARE DOCUMENTATION:     1. This service was provided via Telemedicine using Teams Virtual Rounding      2. Parties in the room with patient during teleconsult RN    3. Confidentiality My office door was closed     4. Participants No one else was in the room    5. Patient acknowledged consent and understanding of privacy and security of the  Telemedicine consult. I informed the patient that I have reviewed their record in Epic and presented the opportunity for them to ask any questions regarding the visit today.  The patient agreed to participate.    6. Time spent 30       Assessment & Plan     Present on Admission:  **None**    Assessment:    Unspecified psychosis    Treatment Plan:    Involuntary psychiatric inpatient treatment is indicated at this time due to the extent of her psychosis and grave impairment of insight and judgment with inability to attend to her ADLs.  Continue current precautions.  Recommend replacing Haldol with Zyprexa initially at 5 mg p.o. daily for psychosis.  This can be further titrated to effect if indicated and as tolerated.  Consider additional Zyprexa 5 to 10 mg p.o. or IM every 4 hours as needed agitation/psychosis.  Total Zyprexa should not exceed 30 mg per 24 hours.  Reconsult psychiatry as needed.    Current Medications:     Current Facility-Administered Medications   Medication Dose Route Frequency Provider Last Rate    acetaminophen  650 mg Oral Q8H PRN Charlotte Obrien MD      albuterol  2 puff Inhalation Q6H PRN Mercedes L Bendock, DO      benztropine  2 mg Intramuscular BID PRN Mercedes L Bendock, DO      haloperidol  5 mg Oral Q6H PRN Mercedes BRITTON Bendock, DO      haloperidol lactate  5 mg Intramuscular Q6H PRN Mercedes L Bendock, DO      ibuprofen  400 mg Oral Q8H PRN Charlotte Obrien MD      LORazepam  2 mg Intramuscular Q6H PRN Mercedes JARQUIN  "Bendock, DO      LORazepam  2 mg Oral Q6H PRN Mercedes Wong, DO      melatonin  3 mg Oral HS PRN Mercedes Wong, DO         Risks / Benefits of Treatment:    Risks, benefits, and possible side effects of medications explained to patient and patient verbalizes understanding.      Other treatment modalities recommended as indicated:    As above      Consult to Psychiatry  Consult performed by: Brice Escobedo MD  Consult ordered by: Mercedes Wong DO        Physician Requesting Consult: Charlotte Obrien MD  Principal Problem:<principal problem not specified>    Reason for Consult: Paranoia; psychosis      History of Present Illness      Patient is a 59 y.o. female for whom psychiatry consult is requested for psychosis.  The following was documented by the physician: \"Patient is a 59-year-old female coming in via police stating \"paranoid and making no sense\".  Police were not available upon my evaluation patient.  Patient resting in bed keeps stating that she is scared or \"I just was brought here to sleep.  Can you turn the AC on\".  Patient repeats these phrases several times.  Patient tells us that she lives in an apartment with a cat.  She remembers sitting out on a bench smoking 2 cigarettes around 330.  Patient cannot tell us much else at this time as she keeps stating that she is here to rest.  She does repeat several times that she is scared.  Upon further questioning patient does not answer.        Chart review shows patient was seen here on June 28 and patient has a history of agoraphobia with panic disorder, schizoaffective disorder, diabetes.  When I asked her about her diabetes she states \"I feel my face and outfield diabetic\".  I asked her about other medication and states that she takes tablets but cannot tell me how much or when.  She cannot tell me the last time she took this.     Patient is very padded and states that she is here to sleep and very tired that no one understands.            " "  History provided by:  Medical records, patient and police  History limited by:  Mental status change     Prior to Admission Medications   Prescriptions Last Dose Informant Patient Reported? Taking?   ALPRAZolam (XANAX) 0.25 mg tablet     Yes No   Sig: Take 0.25 mg by mouth daily at bedtime as needed for anxiety.   Biotin 300 MCG TABS     Yes No   Sig: Take by mouth.   Capsaicin-Menthol 0.025-10 % GEL     No No   Sig: Apply 1 small application topically 3 (three) times a day as needed (pain)   PARoxetine (PAXIL) 30 mg tablet     Yes No   Sig: Take 30 mg by mouth 2 (two) times a day.   clonazePAM (KlonoPIN) 1 mg tablet     Yes No   Sig: Take 1 mg by mouth 2 (two) times a day as needed for seizures.   metFORMIN (GLUCOPHAGE) 500 mg tablet     Yes No   Sig: Take 500 mg by mouth 2 (two) times a day with meals.      Facility-Administered Medications: None         Medical History        Past Medical History:   Diagnosis Date    Diabetes mellitus (HCC)              Surgical History         Past Surgical History:   Procedure Laterality Date     SECTION        CHOLECYSTECTOMY                Family History   History reviewed. No pertinent family history.     I have reviewed and agree with the history as documented.           E-Cigarette/Vaping    E-Cigarette Use Never User        E-Cigarette/Vaping Substances      Social History[]Expand by Default   Social History            Tobacco Use    Smoking status: Every Day       Current packs/day: 0.50       Types: Cigarettes    Smokeless tobacco: Never   Vaping Use    Vaping status: Never Used   Substance Use Topics    Alcohol use: No    Drug use: No      \"    Nursing reports the patient has appeared very confused and very paranoid.  She has been very fearful about doors.  She has been difficult to redirect at times.  She presented covered in feces and smelling of urine.    In meeting with the patient she was laying on her side on the [facility] [place].  She avoided eye " contact.  She appeared very fearful.  She remained nonverbal.  She offered no response to questions.  She may have been attending to internal stimuli.  Insight and judgment are gravely impaired.    [place] suicide severity risk scale: The patient remained nonverbal in response to questions.    Past Medical History:   Diagnosis Date    Diabetes mellitus (HCC)        Medical Review Of Systems:    Review of Systems    Meds/Allergies     all current active meds have been reviewed  No Known Allergies    Objective     Vital signs in last 24 hours:  Temp:  [99.4 °F (37.4 °C)] 99.4 °F (37.4 °C)  HR:  [] 92  Resp:  [15-20] 15  BP: (154-184)/() 154/89      Intake/Output Summary (Last 24 hours) at 7/2/2024 0852  Last data filed at 7/2/2024 0125  Gross per 24 hour   Intake 2000 ml   Output --   Net 2000 ml         Lab Results: I have personally reviewed all pertinent laboratory/tests results.         Imaging Studies: CT head without contrast    Result Date: 7/1/2024  Narrative: CT BRAIN - WITHOUT CONTRAST INDICATION:   AMS. COMPARISON:  None. TECHNIQUE:  CT examination of the brain was performed.  Multiplanar 2D reformatted images were created from the source data. Radiation dose length product (DLP) for this visit:  917 mGy-cm .  This examination, like all CT scans performed in the [facility], was performed utilizing techniques to minimize radiation dose exposure, including the use of iterative reconstruction and automated exposure control. IMAGE QUALITY:  Diagnostic. FINDINGS: PARENCHYMA:  No intracranial mass, mass effect or midline shift. No CT signs of acute infarction.  No acute parenchymal hemorrhage. There is mild periventricular white matter low attenuation which is nonspecific and most likely related to chronic small vessel ischemic changes. VENTRICLES AND EXTRA-AXIAL SPACES:  Normal for the patient's age. VISUALIZED ORBITS: Normal visualized orbits. PARANASAL SINUSES: Normal visualized  "paranasal sinuses. CALVARIUM AND EXTRACRANIAL SOFT TISSUES:  Normal.     Impression: No acute intracranial abnormality. Workstation performed: [id number]     EKG/Pathology/Other Studies: No results found for: \"VENTRATE\", \"ATRIALRATE\", \"PRINT\", \"QRSDINT\", \"QTINT\", \"QTCINT\", \"PAXIS\", \"QRSAXIS\", \"TWAVEAXIS\"     Code Status: No Order  Advance Directive and Living Will:      Power of :    POLST:      Screenings:    1. Nutrition Screening  Not available on chart    2. Pain Screening  Not available on chart    3. Suicide Screening  Not available on chart    Counseling / Coordination of Care:    Total floor / unit time spent today 30 minutes. Greater than 50% of total time was spent with the patient and / or family counseling and / or coordination of care. A description of the counseling / coordination of care: Chart review, patient evaluation, coordination communication with staff, nursing and provider.        "

## 2024-07-02 NOTE — ED NOTES
"Patient remains paranoid, fearful, unable to provide information or decide what to do. Currently unable to care for herself or keep herself safe. She was unable to understand what was being said when [facility] attempted to discuss inpatient mental health treatment with her. Her response was simply \"I'm so tired. I need to sleep.\" [facility] attempted again a while later. Still the same response. Patient was unable to verbalize that she understood any option or information being discussed with her. Ultimately patient was involuntarily committed by a 2 physician 302 for her inability to care for or keep herself safe.     Faxed completed 302 to Intake and to [facility].  "

## 2024-07-02 NOTE — ED NOTES
Per Jason from CT, pt uncooperative at this time for CT, per Jason from CT they will try again at a later time. Provider made aware.      Linda Brunner RN  07/01/24 2102       Linda Brunner RN  07/01/24 2103

## 2024-07-02 NOTE — ED NOTES
Patient provided with menu. Patient appears uninterested or not comprehending. Informed patient that we may choose to order a non select tray on hold in the event she would choose to eat lunch.      Monique Rollins RN  07/02/24 1103

## 2024-07-02 NOTE — ED NOTES
Patient is accepted at [address]  Patient is accepted by Dr.Loghmani ashley Long    Transportation is arranged with [facility]  Transportation is scheduled for TBD    Nurse report is to be called to  [phone]  prior to patient transfer.

## 2024-07-02 NOTE — ED NOTES
Patient attempting to leave room, standing in hallway, taking off gown. Patient requiring frequent redirection to remain in room and keep gown on. Offered patient PO medication but refused. Security at bedside for IM Zyprexa administration.      Jasmin Barlow RN  07/02/24 0654

## 2024-07-02 NOTE — ED CARE HANDOFF
Emergency Department Sign Out Note        Sign out and transfer of care from Dr. Rausch. See Separate Emergency Department note.     The patient, Meli Nowak, was evaluated by the previous provider for acute psychosis.    Workup Completed:  Medically cleared    ED Course / Workup Pending (followup):  Patient seen by me today. Recent external notes reviewed. Does have a hx of DM2, schizoaffective disorder, agoraphobia per chart review. Presented with acute psychosis. Patient tells me her name. Will not answer any additional questions. Pacing in room. Trying to leave but redirects with verbal direction. Patient had normal blood glucose on initial labs. No glucose in urine. Appears that she is prescribed metformin, but by chart review it is unclear when she last took this. PRN meds are already ordered for agitation. I did discuss case with crisis and we are awaiting placement. They did attempt psychiatry consult but patient would not interact with monitor. Will check blood glucose today.      Review of ED notes shows that patient was medically cleared at 0141 by Dr. Martin.                            ED Course as of 07/02/24 1136   Tue Jul 02, 2024   0714 S/o from Dr. Martin. Patient tangential, appears to be responding to internal stimuli, Acute psychosis.    0736 Blood sugar is 116 at this time. Will hold on metformin at this time as blood sugar is normal and patient is only picking at food overnight.   0903 Patient was seen by psychiatry. Appreciate recommendations of zyprexa daily with prn zyprexa. Will make appropriate medication changes.     Procedures  Medical Decision Making  Amount and/or Complexity of Data Reviewed  Labs: ordered.  Radiology: ordered.    Risk  OTC drugs.  Prescription drug management.            Disposition  Final diagnoses:   Paranoia (psychosis) (HCC)   Elevated blood pressure reading     Time reflects when diagnosis was documented in both MDM as applicable and the Disposition within this  note       Time User Action Codes Description Comment    7/1/2024 11:05 PM Bendock, Mercedes L Add [F22] Paranoia (psychosis) (HCC)     7/2/2024 12:46 AM Bendock, Mercedes L Add [I10] HTN (hypertension)     7/2/2024 12:51 AM Bendock, Mercedes L Remove [I10] HTN (hypertension)     7/2/2024 12:51 AM Bendock, Mercedes L Add [R03.0] Elevated blood pressure reading     7/2/2024 10:54 AM Marie Ziegler Add [Z00.8] Medical clearance for psychiatric admission           ED Disposition       None          Follow-up Information    None       Patient's Medications   Discharge Prescriptions    No medications on file     No discharge procedures on file.       ED Provider  Electronically Signed by     Charlotte Obrien MD  07/02/24 0732       Charlotte Obrien MD  07/02/24 0738       Charlotte Obrien MD  07/02/24 9860

## 2024-07-02 NOTE — TELEPHONE ENCOUNTER
Consult placed on [facility] queue at 0808 to be seen by an [facility] psychiatrist.

## 2024-07-02 NOTE — ED NOTES
"Pt refusing blood glucose check and lab work. States \"I dont want anyone to touch me\". Provider aware     Kristi Callejas RN  07/01/24 2019       Kristi Callejas RN  07/02/24 0041    "

## 2024-07-02 NOTE — ED NOTES
"Pt unwilling to participate in psych consult. Pt will not grab iPad and just states \"I dont want to I'm scared, I want to sleep\". Per psychiatrist, Rasheed, will have to call back at another time when patient is more cooperative with call.     Kristi Callejas RN  07/02/24 0358    "

## 2024-07-02 NOTE — EMTALA/ACUTE CARE TRANSFER
[facility] EMERGENCY DEPARTMENT  [address] [address]  Dept: [phone]      EMTALA TRANSFER CONSENT    NAME Meli Nowak                                         1965                              MRN [id number]    I have been informed of my rights regarding examination, treatment, and transfer   by Dr. Charlotte Obrien MD    Benefits: Specialized equipment and/or services available at the receiving facility (Include comment)________________________ (specialized psychiatry care)    Risks: Potential for delay in receiving treatment, Potential deterioration of medical condition, Loss of IV, Increased discomfort during transfer, Possible worsening of condition or death during transfer      Consent for Transfer:  I acknowledge that my medical condition has been evaluated and explained to me by the emergency department physician or other qualified medical person and/or my attending physician, who has recommended that I be transferred to the service of  Accepting Physician: Dr Banuelos at Accepting Facility Name, City & State : [address] The above potential benefits of such transfer, the potential risks associated with such transfer, and the probable risks of not being transferred have been explained to me, and I fully understand them.  The doctor has explained that, in my case, the benefits of transfer outweigh the risks.  I agree to be transferred.    I authorize the performance of emergency medical procedures and treatments upon me in both transit and upon arrival at the receiving facility.  Additionally, I authorize the release of any and all medical records to the receiving facility and request they be transported with me, if possible.  I understand that the safest mode of transportation during a medical emergency is an ambulance and that the Hospital advocates the use of this mode of transport. Risks of traveling to the receiving facility by car,  including absence of medical control, life sustaining equipment, such as oxygen, and medical personnel has been explained to me and I fully understand them.    (GITA CORRECT BOX BELOW)  [  ]  I consent to the stated transfer and to be transported by ambulance/helicopter.  [  ]  I consent to the stated transfer, but refuse transportation by ambulance and accept full responsibility for my transportation by car.  I understand the risks of non-ambulance transfers and I exonerate the Hospital and its staff from any deterioration in my condition that results from this refusal.    X___________________________________________    DATE  24  TIME________  Signature of patient or legally responsible individual signing on patient behalf           RELATIONSHIP TO PATIENT_________________________          Provider Certification    NAME Melikat Nowak                                         1965                              MRN [id number]    A medical screening exam was performed on the above named patient.  Based on the examination:    Condition Necessitating Transfer The primary encounter diagnosis was Paranoia (psychosis) (HCC). Diagnoses of Elevated blood pressure reading and Medical clearance for psychiatric admission were also pertinent to this visit.    Patient Condition: The patient has been stabilized such that within reasonable medical probability, no material deterioration of the patient condition or the condition of the unborn child(candido) is likely to result from the transfer    Reason for Transfer: Level of Care needed not available at this facility (Inpatient psychiatry treatment)    Transfer Requirements: Facility [address]   Space available and qualified personnel available for treatment as acknowledged by Ree  Agreed to accept transfer and to provide appropriate medical treatment as acknowledged by       Dr Banuelos  Appropriate medical records of the examination and treatment of the patient  are provided at the time of transfer   STAFF INITIAL WHEN COMPLETED _______  Transfer will be performed by qualified personnel from [facility]  and appropriate transfer equipment as required, including the use of necessary and appropriate life support measures.    Provider Certification: I have examined the patient and explained the following risks and benefits of being transferred/refusing transfer to the patient/family:  The patient is stable for psychiatric transfer because they are medically stable, and is protected from harming him/herself or others during transport      Based on these reasonable risks and benefits to the patient and/or the unborn child(candido), and based upon the information available at the time of the patient’s examination, I certify that the medical benefits reasonably to be expected from the provision of appropriate medical treatments at another medical facility outweigh the increasing risks, if any, to the individual’s medical condition, and in the case of labor to the unborn child, from effecting the transfer.    X____________________________________________ DATE 07/02/24        TIME_______      ORIGINAL - SEND TO MEDICAL RECORDS   COPY - SEND WITH PATIENT DURING TRANSFER

## 2024-07-02 NOTE — ED NOTES
Pt refused paper scrubs. Pt changed in gown with strings cut off and charge nurse aware. All belongings checked and placed into  20  locker. Pt assisted to bedside commode and then assisted back into bed. Lights turned off and patient resting.     Kristi Callejas RN  07/02/24 0036

## 2024-07-02 NOTE — ED NOTES
Patient frequently requiring redirection to room, paranoid about environment. Patient placed on 1:1 for safety.       Jasmin Barlow RN  07/02/24 0755

## 2024-07-02 NOTE — ED NOTES
Rn contacted [facility] consult at this time. Patient placed in queue       Rashi Palacio RN  07/02/24 0002       Rashi Palacio RN  07/02/24 0003

## 2024-07-02 NOTE — ED NOTES
Patient provided with ipad to talk to psychiatrist, patient just staring at ipad, not answering questions.        Jasmin Barlow RN  07/02/24 0858

## 2024-07-03 ENCOUNTER — HOSPITAL ENCOUNTER (INPATIENT)
Facility: HOSPITAL | Age: 59
LOS: 2 days | Discharge: ANOTHER HOSPITAL | DRG: 682 | End: 2024-07-05
Attending: INTERNAL MEDICINE | Admitting: INTERNAL MEDICINE
Payer: MEDICARE

## 2024-07-03 ENCOUNTER — APPOINTMENT (INPATIENT)
Dept: CT IMAGING | Facility: HOSPITAL | Age: 59
DRG: 885 | End: 2024-07-03
Payer: MEDICARE

## 2024-07-03 ENCOUNTER — APPOINTMENT (INPATIENT)
Dept: RADIOLOGY | Facility: HOSPITAL | Age: 59
DRG: 885 | End: 2024-07-03
Payer: MEDICARE

## 2024-07-03 VITALS
TEMPERATURE: 97.7 F | HEIGHT: 68 IN | RESPIRATION RATE: 20 BRPM | DIASTOLIC BLOOD PRESSURE: 76 MMHG | HEART RATE: 118 BPM | BODY MASS INDEX: 37.61 KG/M2 | SYSTOLIC BLOOD PRESSURE: 126 MMHG | OXYGEN SATURATION: 95 %

## 2024-07-03 DIAGNOSIS — F29 PSYCHOSIS (HCC): Primary | ICD-10-CM

## 2024-07-03 DIAGNOSIS — R41.82 ALTERED MENTAL STATUS: ICD-10-CM

## 2024-07-03 PROBLEM — Z72.0 TOBACCO ABUSE: Status: ACTIVE | Noted: 2024-07-03

## 2024-07-03 PROBLEM — R14.0 ABDOMINAL DISTENSION: Status: ACTIVE | Noted: 2024-07-03

## 2024-07-03 PROBLEM — J45.909 REACTIVE AIRWAY DISEASE: Status: ACTIVE | Noted: 2024-07-03

## 2024-07-03 PROBLEM — Z00.8 MEDICAL CLEARANCE FOR PSYCHIATRIC ADMISSION: Status: ACTIVE | Noted: 2024-07-03

## 2024-07-03 PROBLEM — N17.9 AKI (ACUTE KIDNEY INJURY) (HCC): Status: ACTIVE | Noted: 2024-07-03

## 2024-07-03 PROBLEM — R82.90 ABNORMAL URINALYSIS: Status: ACTIVE | Noted: 2024-07-03

## 2024-07-03 PROBLEM — E66.9 OBESITY: Status: ACTIVE | Noted: 2024-07-03

## 2024-07-03 PROBLEM — R65.10 SIRS (SYSTEMIC INFLAMMATORY RESPONSE SYNDROME) (HCC): Status: ACTIVE | Noted: 2024-07-03

## 2024-07-03 PROBLEM — R00.0 TACHYCARDIA: Status: ACTIVE | Noted: 2024-07-03

## 2024-07-03 PROBLEM — M62.82 RHABDOMYOLYSIS: Status: ACTIVE | Noted: 2024-07-03

## 2024-07-03 PROBLEM — E78.5 HYPERLIPIDEMIA: Status: ACTIVE | Noted: 2024-07-03

## 2024-07-03 PROBLEM — E11.9 TYPE 2 DIABETES MELLITUS (HCC): Status: ACTIVE | Noted: 2024-07-03

## 2024-07-03 LAB
25(OH)D3 SERPL-MCNC: 42.7 NG/ML (ref 30–100)
ALBUMIN SERPL BCG-MCNC: 5.1 G/DL (ref 3.5–5)
ALP SERPL-CCNC: 101 U/L (ref 34–104)
ALT SERPL W P-5'-P-CCNC: 17 U/L (ref 7–52)
AMMONIA PLAS-SCNC: 32 UMOL/L (ref 18–72)
ANION GAP SERPL CALCULATED.3IONS-SCNC: 16 MMOL/L (ref 4–13)
AST SERPL W P-5'-P-CCNC: 28 U/L (ref 13–39)
ATRIAL RATE: 90 BPM
BASOPHILS # BLD AUTO: 0.04 THOUSANDS/ÂΜL (ref 0–0.1)
BASOPHILS # BLD AUTO: 0.06 THOUSANDS/ÂΜL (ref 0–0.1)
BASOPHILS NFR BLD AUTO: 0 % (ref 0–1)
BASOPHILS NFR BLD AUTO: 0 % (ref 0–1)
BILIRUB SERPL-MCNC: 0.63 MG/DL (ref 0.2–1)
BUN SERPL-MCNC: 33 MG/DL (ref 5–25)
CALCIUM SERPL-MCNC: 10.4 MG/DL (ref 8.4–10.2)
CHLORIDE SERPL-SCNC: 106 MMOL/L (ref 96–108)
CK SERPL-CCNC: 518 U/L (ref 26–192)
CO2 SERPL-SCNC: 24 MMOL/L (ref 21–32)
CREAT SERPL-MCNC: 1.88 MG/DL (ref 0.6–1.3)
CRP SERPL QL: 4.1 MG/L
EOSINOPHIL # BLD AUTO: 0 THOUSAND/ÂΜL (ref 0–0.61)
EOSINOPHIL # BLD AUTO: 0 THOUSAND/ÂΜL (ref 0–0.61)
EOSINOPHIL NFR BLD AUTO: 0 % (ref 0–6)
EOSINOPHIL NFR BLD AUTO: 0 % (ref 0–6)
ERYTHROCYTE [DISTWIDTH] IN BLOOD BY AUTOMATED COUNT: 16.8 % (ref 11.6–15.1)
ERYTHROCYTE [DISTWIDTH] IN BLOOD BY AUTOMATED COUNT: 17.2 % (ref 11.6–15.1)
FOLATE SERPL-MCNC: >22.3 NG/ML
GFR SERPL CREATININE-BSD FRML MDRD: 29 ML/MIN/1.73SQ M
GLUCOSE SERPL-MCNC: 104 MG/DL (ref 65–140)
GLUCOSE SERPL-MCNC: 113 MG/DL (ref 65–140)
GLUCOSE SERPL-MCNC: 124 MG/DL (ref 65–140)
GLUCOSE SERPL-MCNC: 134 MG/DL (ref 65–140)
GLUCOSE SERPL-MCNC: 135 MG/DL (ref 65–140)
GLUCOSE SERPL-MCNC: 148 MG/DL (ref 65–140)
HCT VFR BLD AUTO: 41.6 % (ref 34.8–46.1)
HCT VFR BLD AUTO: 47.9 % (ref 34.8–46.1)
HGB BLD-MCNC: 12.8 G/DL (ref 11.5–15.4)
HGB BLD-MCNC: 14.8 G/DL (ref 11.5–15.4)
IMM GRANULOCYTES # BLD AUTO: 0.04 THOUSAND/UL (ref 0–0.2)
IMM GRANULOCYTES # BLD AUTO: 0.08 THOUSAND/UL (ref 0–0.2)
IMM GRANULOCYTES NFR BLD AUTO: 0 % (ref 0–2)
IMM GRANULOCYTES NFR BLD AUTO: 1 % (ref 0–2)
LACTATE SERPL-SCNC: 0.8 MMOL/L (ref 0.5–2)
LYMPHOCYTES # BLD AUTO: 1.48 THOUSANDS/ÂΜL (ref 0.6–4.47)
LYMPHOCYTES # BLD AUTO: 1.83 THOUSANDS/ÂΜL (ref 0.6–4.47)
LYMPHOCYTES NFR BLD AUTO: 10 % (ref 14–44)
LYMPHOCYTES NFR BLD AUTO: 14 % (ref 14–44)
MAGNESIUM SERPL-MCNC: 2.2 MG/DL (ref 1.9–2.7)
MCH RBC QN AUTO: 28.6 PG (ref 26.8–34.3)
MCH RBC QN AUTO: 28.6 PG (ref 26.8–34.3)
MCHC RBC AUTO-ENTMCNC: 30.8 G/DL (ref 31.4–37.4)
MCHC RBC AUTO-ENTMCNC: 30.9 G/DL (ref 31.4–37.4)
MCV RBC AUTO: 93 FL (ref 82–98)
MCV RBC AUTO: 93 FL (ref 82–98)
MONOCYTES # BLD AUTO: 1.19 THOUSAND/ÂΜL (ref 0.17–1.22)
MONOCYTES # BLD AUTO: 1.76 THOUSAND/ÂΜL (ref 0.17–1.22)
MONOCYTES NFR BLD AUTO: 14 % (ref 4–12)
MONOCYTES NFR BLD AUTO: 8 % (ref 4–12)
NEUTROPHILS # BLD AUTO: 12.08 THOUSANDS/ÂΜL (ref 1.85–7.62)
NEUTROPHILS # BLD AUTO: 9.03 THOUSANDS/ÂΜL (ref 1.85–7.62)
NEUTS SEG NFR BLD AUTO: 72 % (ref 43–75)
NEUTS SEG NFR BLD AUTO: 81 % (ref 43–75)
NRBC BLD AUTO-RTO: 0 /100 WBCS
NRBC BLD AUTO-RTO: 0 /100 WBCS
P AXIS: 33 DEGREES
PHOSPHATE SERPL-MCNC: 4.6 MG/DL (ref 2.7–4.5)
PLATELET # BLD AUTO: 322 THOUSANDS/UL (ref 149–390)
PLATELET # BLD AUTO: 378 THOUSANDS/UL (ref 149–390)
PMV BLD AUTO: 9.1 FL (ref 8.9–12.7)
PMV BLD AUTO: 9.7 FL (ref 8.9–12.7)
POTASSIUM SERPL-SCNC: 4 MMOL/L (ref 3.5–5.3)
PR INTERVAL: 132 MS
PROCALCITONIN SERPL-MCNC: 0.26 NG/ML
PROT SERPL-MCNC: 8.6 G/DL (ref 6.4–8.4)
QRS AXIS: 38 DEGREES
QRSD INTERVAL: 100 MS
QT INTERVAL: 366 MS
QTC INTERVAL: 447 MS
RBC # BLD AUTO: 4.47 MILLION/UL (ref 3.81–5.12)
RBC # BLD AUTO: 5.18 MILLION/UL (ref 3.81–5.12)
SODIUM SERPL-SCNC: 146 MMOL/L (ref 135–147)
T WAVE AXIS: 112 DEGREES
VENTRICULAR RATE: 90 BPM
VIT B12 SERPL-MCNC: 408 PG/ML (ref 180–914)
WBC # BLD AUTO: 12.7 THOUSAND/UL (ref 4.31–10.16)
WBC # BLD AUTO: 14.89 THOUSAND/UL (ref 4.31–10.16)

## 2024-07-03 PROCEDURE — 83605 ASSAY OF LACTIC ACID: CPT | Performed by: NURSE PRACTITIONER

## 2024-07-03 PROCEDURE — 84100 ASSAY OF PHOSPHORUS: CPT

## 2024-07-03 PROCEDURE — 82306 VITAMIN D 25 HYDROXY: CPT

## 2024-07-03 PROCEDURE — 87086 URINE CULTURE/COLONY COUNT: CPT | Performed by: NURSE PRACTITIONER

## 2024-07-03 PROCEDURE — 80053 COMPREHEN METABOLIC PANEL: CPT

## 2024-07-03 PROCEDURE — 84145 PROCALCITONIN (PCT): CPT | Performed by: NURSE PRACTITIONER

## 2024-07-03 PROCEDURE — 85025 COMPLETE CBC W/AUTO DIFF WBC: CPT

## 2024-07-03 PROCEDURE — 82140 ASSAY OF AMMONIA: CPT | Performed by: NURSE PRACTITIONER

## 2024-07-03 PROCEDURE — 71250 CT THORAX DX C-: CPT

## 2024-07-03 PROCEDURE — 85025 COMPLETE CBC W/AUTO DIFF WBC: CPT | Performed by: NURSE PRACTITIONER

## 2024-07-03 PROCEDURE — 87040 BLOOD CULTURE FOR BACTERIA: CPT | Performed by: NURSE PRACTITIONER

## 2024-07-03 PROCEDURE — 99222 1ST HOSP IP/OBS MODERATE 55: CPT | Performed by: NURSE PRACTITIONER

## 2024-07-03 PROCEDURE — 83735 ASSAY OF MAGNESIUM: CPT

## 2024-07-03 PROCEDURE — 82607 VITAMIN B-12: CPT

## 2024-07-03 PROCEDURE — 82948 REAGENT STRIP/BLOOD GLUCOSE: CPT

## 2024-07-03 PROCEDURE — 99223 1ST HOSP IP/OBS HIGH 75: CPT | Performed by: INTERNAL MEDICINE

## 2024-07-03 PROCEDURE — 74176 CT ABD & PELVIS W/O CONTRAST: CPT

## 2024-07-03 PROCEDURE — 82550 ASSAY OF CK (CPK): CPT | Performed by: NURSE PRACTITIONER

## 2024-07-03 PROCEDURE — 71045 X-RAY EXAM CHEST 1 VIEW: CPT

## 2024-07-03 PROCEDURE — 93010 ELECTROCARDIOGRAM REPORT: CPT | Performed by: STUDENT IN AN ORGANIZED HEALTH CARE EDUCATION/TRAINING PROGRAM

## 2024-07-03 PROCEDURE — 93005 ELECTROCARDIOGRAM TRACING: CPT

## 2024-07-03 PROCEDURE — 82746 ASSAY OF FOLIC ACID SERUM: CPT

## 2024-07-03 PROCEDURE — 86140 C-REACTIVE PROTEIN: CPT | Performed by: NURSE PRACTITIONER

## 2024-07-03 PROCEDURE — 99236 HOSP IP/OBS SAME DATE HI 85: CPT | Performed by: PSYCHIATRY & NEUROLOGY

## 2024-07-03 RX ORDER — OLANZAPINE 10 MG/2ML
5 INJECTION, POWDER, FOR SOLUTION INTRAMUSCULAR
Status: DISCONTINUED | OUTPATIENT
Start: 2024-07-03 | End: 2024-07-03

## 2024-07-03 RX ORDER — ACETAMINOPHEN 325 MG/1
650 TABLET ORAL EVERY 4 HOURS PRN
Status: CANCELLED | OUTPATIENT
Start: 2024-07-03

## 2024-07-03 RX ORDER — SENNOSIDES 8.6 MG
2 TABLET ORAL
Status: DISCONTINUED | OUTPATIENT
Start: 2024-07-03 | End: 2024-07-03 | Stop reason: HOSPADM

## 2024-07-03 RX ORDER — MAGNESIUM HYDROXIDE/ALUMINUM HYDROXICE/SIMETHICONE 120; 1200; 1200 MG/30ML; MG/30ML; MG/30ML
30 SUSPENSION ORAL EVERY 4 HOURS PRN
Status: DISCONTINUED | OUTPATIENT
Start: 2024-07-03 | End: 2024-07-05 | Stop reason: HOSPADM

## 2024-07-03 RX ORDER — LANOLIN ALCOHOL/MO/W.PET/CERES
3 CREAM (GRAM) TOPICAL
Status: DISCONTINUED | OUTPATIENT
Start: 2024-07-03 | End: 2024-07-05 | Stop reason: HOSPADM

## 2024-07-03 RX ORDER — OLANZAPINE 10 MG/2ML
5 INJECTION, POWDER, FOR SOLUTION INTRAMUSCULAR
Status: CANCELLED | OUTPATIENT
Start: 2024-07-03

## 2024-07-03 RX ORDER — ALBUTEROL SULFATE 90 UG/1
2 AEROSOL, METERED RESPIRATORY (INHALATION) EVERY 4 HOURS PRN
Status: DISCONTINUED | OUTPATIENT
Start: 2024-07-03 | End: 2024-07-05 | Stop reason: HOSPADM

## 2024-07-03 RX ORDER — CEFTRIAXONE 1 G/50ML
1000 INJECTION, SOLUTION INTRAVENOUS EVERY 24 HOURS
Status: DISCONTINUED | OUTPATIENT
Start: 2024-07-04 | End: 2024-07-03

## 2024-07-03 RX ORDER — SENNOSIDES 8.6 MG
2 TABLET ORAL
Status: CANCELLED | OUTPATIENT
Start: 2024-07-03

## 2024-07-03 RX ORDER — HYDROXYZINE 50 MG/1
50 TABLET, FILM COATED ORAL
Status: DISCONTINUED | OUTPATIENT
Start: 2024-07-03 | End: 2024-07-05 | Stop reason: HOSPADM

## 2024-07-03 RX ORDER — ALPRAZOLAM 0.5 MG/1
1 TABLET ORAL 3 TIMES DAILY PRN
Status: CANCELLED | OUTPATIENT
Start: 2024-07-03

## 2024-07-03 RX ORDER — SODIUM CHLORIDE, SODIUM GLUCONATE, SODIUM ACETATE, POTASSIUM CHLORIDE, MAGNESIUM CHLORIDE, SODIUM PHOSPHATE, DIBASIC, AND POTASSIUM PHOSPHATE .53; .5; .37; .037; .03; .012; .00082 G/100ML; G/100ML; G/100ML; G/100ML; G/100ML; G/100ML; G/100ML
125 INJECTION, SOLUTION INTRAVENOUS CONTINUOUS
Status: CANCELLED | OUTPATIENT
Start: 2024-07-03

## 2024-07-03 RX ORDER — CEFTRIAXONE 1 G/50ML
1000 INJECTION, SOLUTION INTRAVENOUS EVERY 24 HOURS
Status: DISCONTINUED | OUTPATIENT
Start: 2024-07-03 | End: 2024-07-05 | Stop reason: HOSPADM

## 2024-07-03 RX ORDER — ONDANSETRON 4 MG/1
4 TABLET, ORALLY DISINTEGRATING ORAL EVERY 8 HOURS PRN
Status: DISCONTINUED | OUTPATIENT
Start: 2024-07-03 | End: 2024-07-03 | Stop reason: HOSPADM

## 2024-07-03 RX ORDER — WATER 10 ML/10ML
INJECTION INTRAMUSCULAR; INTRAVENOUS; SUBCUTANEOUS
Status: COMPLETED
Start: 2024-07-03 | End: 2024-07-03

## 2024-07-03 RX ORDER — LANOLIN ALCOHOL/MO/W.PET/CERES
3 CREAM (GRAM) TOPICAL
Status: CANCELLED | OUTPATIENT
Start: 2024-07-03

## 2024-07-03 RX ORDER — ACETAMINOPHEN 325 MG/1
975 TABLET ORAL EVERY 6 HOURS PRN
Status: CANCELLED | OUTPATIENT
Start: 2024-07-03

## 2024-07-03 RX ORDER — INSULIN LISPRO 100 [IU]/ML
1-5 INJECTION, SOLUTION INTRAVENOUS; SUBCUTANEOUS
Status: CANCELLED | OUTPATIENT
Start: 2024-07-03

## 2024-07-03 RX ORDER — METRONIDAZOLE 500 MG/100ML
500 INJECTION, SOLUTION INTRAVENOUS EVERY 8 HOURS
Status: DISCONTINUED | OUTPATIENT
Start: 2024-07-03 | End: 2024-07-03 | Stop reason: HOSPADM

## 2024-07-03 RX ORDER — ALBUTEROL SULFATE 90 UG/1
2 AEROSOL, METERED RESPIRATORY (INHALATION) EVERY 4 HOURS PRN
Status: CANCELLED | OUTPATIENT
Start: 2024-07-03

## 2024-07-03 RX ORDER — LORAZEPAM 2 MG/ML
1 INJECTION INTRAMUSCULAR
Status: DISCONTINUED | OUTPATIENT
Start: 2024-07-03 | End: 2024-07-03

## 2024-07-03 RX ORDER — MAGNESIUM HYDROXIDE/ALUMINUM HYDROXICE/SIMETHICONE 120; 1200; 1200 MG/30ML; MG/30ML; MG/30ML
30 SUSPENSION ORAL EVERY 4 HOURS PRN
Status: DISCONTINUED | OUTPATIENT
Start: 2024-07-03 | End: 2024-07-03 | Stop reason: HOSPADM

## 2024-07-03 RX ORDER — HYDROXYZINE 50 MG/1
50 TABLET, FILM COATED ORAL
Status: CANCELLED | OUTPATIENT
Start: 2024-07-03

## 2024-07-03 RX ORDER — INSULIN LISPRO 100 [IU]/ML
1-5 INJECTION, SOLUTION INTRAVENOUS; SUBCUTANEOUS
Status: DISCONTINUED | OUTPATIENT
Start: 2024-07-03 | End: 2024-07-05 | Stop reason: HOSPADM

## 2024-07-03 RX ORDER — LORAZEPAM 2 MG/ML
1 INJECTION INTRAMUSCULAR
Status: CANCELLED | OUTPATIENT
Start: 2024-07-03

## 2024-07-03 RX ORDER — CEFTRIAXONE 1 G/50ML
1000 INJECTION, SOLUTION INTRAVENOUS EVERY 24 HOURS
Status: CANCELLED | OUTPATIENT
Start: 2024-07-04

## 2024-07-03 RX ORDER — POLYETHYLENE GLYCOL 3350 17 G/17G
17 POWDER, FOR SOLUTION ORAL DAILY
Status: DISCONTINUED | OUTPATIENT
Start: 2024-07-04 | End: 2024-07-05 | Stop reason: HOSPADM

## 2024-07-03 RX ORDER — ACETAMINOPHEN 325 MG/1
650 TABLET ORAL EVERY 4 HOURS PRN
Status: DISCONTINUED | OUTPATIENT
Start: 2024-07-03 | End: 2024-07-05 | Stop reason: HOSPADM

## 2024-07-03 RX ORDER — ONDANSETRON 4 MG/1
4 TABLET, ORALLY DISINTEGRATING ORAL EVERY 8 HOURS PRN
Status: CANCELLED | OUTPATIENT
Start: 2024-07-03

## 2024-07-03 RX ORDER — POLYETHYLENE GLYCOL 3350 17 G/17G
17 POWDER, FOR SOLUTION ORAL DAILY PRN
Status: CANCELLED | OUTPATIENT
Start: 2024-07-03

## 2024-07-03 RX ORDER — ALPRAZOLAM 0.5 MG/1
1 TABLET ORAL 3 TIMES DAILY PRN
Status: DISCONTINUED | OUTPATIENT
Start: 2024-07-03 | End: 2024-07-03

## 2024-07-03 RX ORDER — METRONIDAZOLE 500 MG/100ML
500 INJECTION, SOLUTION INTRAVENOUS EVERY 8 HOURS
Status: CANCELLED | OUTPATIENT
Start: 2024-07-03

## 2024-07-03 RX ORDER — ASPIRIN 81 MG/1
81 TABLET, CHEWABLE ORAL DAILY
Status: DISCONTINUED | OUTPATIENT
Start: 2024-07-04 | End: 2024-07-05 | Stop reason: HOSPADM

## 2024-07-03 RX ORDER — AMOXICILLIN 250 MG
1 CAPSULE ORAL DAILY PRN
Status: CANCELLED | OUTPATIENT
Start: 2024-07-03

## 2024-07-03 RX ORDER — FAMOTIDINE 20 MG/1
20 TABLET, FILM COATED ORAL 2 TIMES DAILY
Status: DISCONTINUED | OUTPATIENT
Start: 2024-07-03 | End: 2024-07-03

## 2024-07-03 RX ORDER — HYDROXYZINE HYDROCHLORIDE 25 MG/1
25 TABLET, FILM COATED ORAL
Status: DISCONTINUED | OUTPATIENT
Start: 2024-07-03 | End: 2024-07-05

## 2024-07-03 RX ORDER — MAGNESIUM HYDROXIDE/ALUMINUM HYDROXICE/SIMETHICONE 120; 1200; 1200 MG/30ML; MG/30ML; MG/30ML
30 SUSPENSION ORAL EVERY 4 HOURS PRN
Status: CANCELLED | OUTPATIENT
Start: 2024-07-03

## 2024-07-03 RX ORDER — ASPIRIN 81 MG/1
81 TABLET, CHEWABLE ORAL DAILY
Status: CANCELLED | OUTPATIENT
Start: 2024-07-04

## 2024-07-03 RX ORDER — HEPARIN SODIUM 5000 [USP'U]/ML
5000 INJECTION, SOLUTION INTRAVENOUS; SUBCUTANEOUS EVERY 8 HOURS SCHEDULED
Status: DISCONTINUED | OUTPATIENT
Start: 2024-07-03 | End: 2024-07-05

## 2024-07-03 RX ORDER — LORAZEPAM 1 MG/1
1 TABLET ORAL
Status: CANCELLED | OUTPATIENT
Start: 2024-07-03

## 2024-07-03 RX ORDER — ALBUTEROL SULFATE 90 UG/1
2 AEROSOL, METERED RESPIRATORY (INHALATION) EVERY 4 HOURS PRN
Status: DISCONTINUED | OUTPATIENT
Start: 2024-07-03 | End: 2024-07-03 | Stop reason: HOSPADM

## 2024-07-03 RX ORDER — LORAZEPAM 0.5 MG/1
0.5 TABLET ORAL 4 TIMES DAILY
Status: DISCONTINUED | OUTPATIENT
Start: 2024-07-03 | End: 2024-07-04

## 2024-07-03 RX ORDER — LORAZEPAM 2 MG/ML
0.5 INJECTION INTRAMUSCULAR 4 TIMES DAILY
Status: DISCONTINUED | OUTPATIENT
Start: 2024-07-03 | End: 2024-07-04

## 2024-07-03 RX ORDER — CEFTRIAXONE 1 G/50ML
1000 INJECTION, SOLUTION INTRAVENOUS EVERY 24 HOURS
Status: DISCONTINUED | OUTPATIENT
Start: 2024-07-03 | End: 2024-07-03 | Stop reason: HOSPADM

## 2024-07-03 RX ORDER — POLYETHYLENE GLYCOL 3350 17 G/17G
17 POWDER, FOR SOLUTION ORAL DAILY
Status: CANCELLED | OUTPATIENT
Start: 2024-07-04

## 2024-07-03 RX ORDER — SODIUM CHLORIDE, SODIUM GLUCONATE, SODIUM ACETATE, POTASSIUM CHLORIDE, MAGNESIUM CHLORIDE, SODIUM PHOSPHATE, DIBASIC, AND POTASSIUM PHOSPHATE .53; .5; .37; .037; .03; .012; .00082 G/100ML; G/100ML; G/100ML; G/100ML; G/100ML; G/100ML; G/100ML
100 INJECTION, SOLUTION INTRAVENOUS CONTINUOUS
Status: DISCONTINUED | OUTPATIENT
Start: 2024-07-03 | End: 2024-07-05

## 2024-07-03 RX ORDER — LORAZEPAM 1 MG/1
1 TABLET ORAL
Status: DISCONTINUED | OUTPATIENT
Start: 2024-07-03 | End: 2024-07-03

## 2024-07-03 RX ORDER — METRONIDAZOLE 500 MG/100ML
500 INJECTION, SOLUTION INTRAVENOUS EVERY 8 HOURS
Status: DISCONTINUED | OUTPATIENT
Start: 2024-07-03 | End: 2024-07-03

## 2024-07-03 RX ORDER — SENNOSIDES 8.6 MG
2 TABLET ORAL
Status: DISCONTINUED | OUTPATIENT
Start: 2024-07-03 | End: 2024-07-05 | Stop reason: HOSPADM

## 2024-07-03 RX ORDER — HYDRALAZINE HYDROCHLORIDE 20 MG/ML
10 INJECTION INTRAMUSCULAR; INTRAVENOUS EVERY 6 HOURS PRN
Status: DISCONTINUED | OUTPATIENT
Start: 2024-07-03 | End: 2024-07-04

## 2024-07-03 RX ORDER — TRAZODONE HYDROCHLORIDE 50 MG/1
50 TABLET ORAL
Status: DISCONTINUED | OUTPATIENT
Start: 2024-07-03 | End: 2024-07-05 | Stop reason: HOSPADM

## 2024-07-03 RX ORDER — HYDROXYZINE HYDROCHLORIDE 25 MG/1
25 TABLET, FILM COATED ORAL
Status: CANCELLED | OUTPATIENT
Start: 2024-07-03

## 2024-07-03 RX ORDER — INSULIN LISPRO 100 [IU]/ML
1-5 INJECTION, SOLUTION INTRAVENOUS; SUBCUTANEOUS
Status: DISCONTINUED | OUTPATIENT
Start: 2024-07-03 | End: 2024-07-03 | Stop reason: HOSPADM

## 2024-07-03 RX ORDER — ACETAMINOPHEN 325 MG/1
975 TABLET ORAL EVERY 6 HOURS PRN
Status: DISCONTINUED | OUTPATIENT
Start: 2024-07-03 | End: 2024-07-05 | Stop reason: HOSPADM

## 2024-07-03 RX ORDER — POLYETHYLENE GLYCOL 3350 17 G/17G
17 POWDER, FOR SOLUTION ORAL DAILY PRN
Status: DISCONTINUED | OUTPATIENT
Start: 2024-07-03 | End: 2024-07-04

## 2024-07-03 RX ORDER — POLYETHYLENE GLYCOL 3350 17 G/17G
17 POWDER, FOR SOLUTION ORAL DAILY
Status: DISCONTINUED | OUTPATIENT
Start: 2024-07-03 | End: 2024-07-03 | Stop reason: HOSPADM

## 2024-07-03 RX ORDER — ONDANSETRON 4 MG/1
4 TABLET, ORALLY DISINTEGRATING ORAL EVERY 8 HOURS PRN
Status: DISCONTINUED | OUTPATIENT
Start: 2024-07-03 | End: 2024-07-05 | Stop reason: HOSPADM

## 2024-07-03 RX ORDER — ASPIRIN 81 MG/1
81 TABLET, CHEWABLE ORAL DAILY
Status: DISCONTINUED | OUTPATIENT
Start: 2024-07-03 | End: 2024-07-03 | Stop reason: HOSPADM

## 2024-07-03 RX ORDER — SODIUM CHLORIDE, SODIUM GLUCONATE, SODIUM ACETATE, POTASSIUM CHLORIDE, MAGNESIUM CHLORIDE, SODIUM PHOSPHATE, DIBASIC, AND POTASSIUM PHOSPHATE .53; .5; .37; .037; .03; .012; .00082 G/100ML; G/100ML; G/100ML; G/100ML; G/100ML; G/100ML; G/100ML
1000 INJECTION, SOLUTION INTRAVENOUS ONCE
Status: COMPLETED | OUTPATIENT
Start: 2024-07-03 | End: 2024-07-03

## 2024-07-03 RX ORDER — TRAZODONE HYDROCHLORIDE 50 MG/1
50 TABLET ORAL
Status: CANCELLED | OUTPATIENT
Start: 2024-07-03

## 2024-07-03 RX ORDER — ATORVASTATIN CALCIUM 10 MG/1
10 TABLET, FILM COATED ORAL
Status: DISCONTINUED | OUTPATIENT
Start: 2024-07-04 | End: 2024-07-03

## 2024-07-03 RX ORDER — AMOXICILLIN 250 MG
1 CAPSULE ORAL DAILY PRN
Status: DISCONTINUED | OUTPATIENT
Start: 2024-07-03 | End: 2024-07-05 | Stop reason: HOSPADM

## 2024-07-03 RX ADMIN — ALPRAZOLAM 1 MG: 0.5 TABLET ORAL at 12:33

## 2024-07-03 RX ADMIN — OLANZAPINE 5 MG: 5 TABLET, FILM COATED ORAL at 09:09

## 2024-07-03 RX ADMIN — CEFTRIAXONE 1000 MG: 1 INJECTION, SOLUTION INTRAVENOUS at 21:17

## 2024-07-03 RX ADMIN — SODIUM CHLORIDE, SODIUM GLUCONATE, SODIUM ACETATE, POTASSIUM CHLORIDE, MAGNESIUM CHLORIDE, SODIUM PHOSPHATE, DIBASIC, AND POTASSIUM PHOSPHATE 125 ML/HR: .53; .5; .37; .037; .03; .012; .00082 INJECTION, SOLUTION INTRAVENOUS at 16:26

## 2024-07-03 RX ADMIN — WATER 10 ML: 1 INJECTION, SOLUTION INTRAMUSCULAR; INTRAVENOUS; SUBCUTANEOUS at 05:52

## 2024-07-03 RX ADMIN — ASPIRIN 81 MG CHEWABLE TABLET 81 MG: 81 TABLET CHEWABLE at 11:58

## 2024-07-03 RX ADMIN — SODIUM CHLORIDE, SODIUM GLUCONATE, SODIUM ACETATE, POTASSIUM CHLORIDE, MAGNESIUM CHLORIDE, SODIUM PHOSPHATE, DIBASIC, AND POTASSIUM PHOSPHATE 1000 ML: .53; .5; .37; .037; .03; .012; .00082 INJECTION, SOLUTION INTRAVENOUS at 13:39

## 2024-07-03 RX ADMIN — HEPARIN SODIUM 5000 UNITS: 5000 INJECTION INTRAVENOUS; SUBCUTANEOUS at 23:32

## 2024-07-03 RX ADMIN — HEPARIN SODIUM 5000 UNITS: 5000 INJECTION INTRAVENOUS; SUBCUTANEOUS at 18:00

## 2024-07-03 RX ADMIN — LORAZEPAM 0.5 MG: 0.5 TABLET ORAL at 23:32

## 2024-07-03 RX ADMIN — ACETAMINOPHEN 650 MG: 325 TABLET ORAL at 23:39

## 2024-07-03 RX ADMIN — LORAZEPAM 0.5 MG: 0.5 TABLET ORAL at 17:58

## 2024-07-03 RX ADMIN — OLANZAPINE 5 MG: 10 INJECTION, POWDER, FOR SOLUTION INTRAMUSCULAR at 05:51

## 2024-07-03 RX ADMIN — POLYETHYLENE GLYCOL 3350 17 G: 17 POWDER, FOR SOLUTION ORAL at 11:58

## 2024-07-03 RX ADMIN — MELATONIN TAB 3 MG 3 MG: 3 TAB at 23:32

## 2024-07-03 RX ADMIN — SENNOSIDES 17.2 MG: 8.6 TABLET, FILM COATED ORAL at 23:32

## 2024-07-03 RX ADMIN — FAMOTIDINE 20 MG: 20 TABLET, FILM COATED ORAL at 11:58

## 2024-07-03 RX ADMIN — LORAZEPAM 1 MG: 2 INJECTION INTRAMUSCULAR; INTRAVENOUS at 02:04

## 2024-07-03 NOTE — H&P
[facility]  H&P  Name: Meli Nowak 59 y.o. female I MRN: [id number]  Unit/Bed#: 7T [facility] 717-01 I Date of Admission: 7/3/2024   Date of Service: 7/3/2024 I Hospital Day: 0      Assessment & Plan   * NICHOLAS (acute kidney injury) ([facility])  Assessment & Plan  POA, as evidenced by creatinine 1.8 with a baseline near 0.9, likely prerenal due to dehydration  Given 1 L of Isolyte  Will start isolate at 100 mL/hr per hour  Limit nephrotoxic agents and avoid hypotension  Monitor I&O and PVRs  Creatinine in the morning    SIRS (systemic inflammatory response syndrome) ([facility])  Assessment & Plan  POA, as evidenced by tachycardia and leukocytosis  No source of infection but suspicious for UTI   CT C/A/P unremarkable   Empirically start IV Rocephin  Follow-up up blood and urine cultures, Pro-Yves and lactic acidosis  Trend fever curve and CBC with differential    Abdominal distension  Assessment & Plan  Abdominal distention on exam, no signs of pain or discomfort with palpation, suspecting hernia   CT A/P unremarkable   Will hold off on starting flagyl at this time   Bowel regimen   I+O, PVRs    Rhabdomyolysis  Assessment & Plan   likely due to NICHOLAS  Given 1 L of isolate  Continue IV fluids and repeat CK in the morning    Abnormal urinalysis  Assessment & Plan  Urinalysis abnormal with mucus threads and WBCs  Follow-up urine culture  Okay to straight cath if needed for sample  Empirically start IV Rocephin    Psychosis ([facility])  Assessment & Plan  Initially admitted to [facility] inpatient behavioral health unit.  There was concern for atypical NMS given diaphoresis, tachycardia, episode of hypertension, and mild rigidity.  Her blood pressure and rigidity has improved.  Her CK is mildly elevated likely from rhabdomyolysis.  Currently, she is not meeting NMS criteria per psychiatry  Continue one-to-one  Appreciate psychiatry input    Reactive airway disease  Assessment & Plan  Stable, no signs of  exacerbation  Continue home Arnuity and Albuterol PRN     Hyperlipidemia  Assessment & Plan  Holding statin in the setting of NICHOLAS     Tobacco abuse  Assessment & Plan  Patient unable to state whether she continues to smoke  Consider nicotine patch if needed    Obesity  Assessment & Plan  Would benefit from weight loss and therapeutic lifestyle changes  Education and counseling as tolerated   Consider nutrition evaluation       Type 2 diabetes mellitus (HCC)  Assessment & Plan  Lab Results   Component Value Date    HGBA1C 6.4 (H) 05/03/2024       Recent Labs     07/02/24  2021 07/03/24  0301 07/03/24  0555 07/03/24  1211   POCGLU 88 104 135 134       Blood Sugar Average: Last 72 hrs:  A1c 6.4  Hold home Metformin  Monitor ACCU checks AC + HS with lispro insulin sliding scale coverage          VTE Pharmacologic Prophylaxis:   Moderate Risk (Score 3-4) - Pharmacological DVT Prophylaxis Ordered: heparin.  Code Status: Level 1 - Full Code   Discussion with family: Attempted to update  (friend) via phone. Unable to contact. Attempted tp call, Conchita, no answer. Will try again.     Anticipated Length of Stay: Patient will be admitted on an inpatient basis with an anticipated length of stay of greater than 2 midnights secondary to NICHOLAS, SIRS.    Total Time Spent on Date of Encounter in care of patient:  mins. This time was spent on one or more of the following: performing physical exam; counseling and coordination of care; obtaining or reviewing history; documenting in the medical record; reviewing/ordering tests, medications or procedures; communicating with other healthcare professionals and discussing with patient's family/caregivers.    Chief Complaint: NICHOLAS    History of Present Illness:  Meli Nowak is a 59 y.o. female with a PMH of with a PMH including T2DM, schizophrenia, HLD, tobacco use, endometrial cancer s/p surgical bulking, PTSD, morbid obesity who presents with NICHOLAS. Initially, patient presented  to [facility] on 2024 via police who found her sitting on a bench paranoid and not making sense. Patient was a poor historian. Patient's medical workup was negative. She met with Crisis however, was unable to sign a 201 due to lacking capacity. A 302 was upheld, and patient was transferred to [facility] for inpatient behavioral health unit. Patient appeared diaphoretic and tachycardic during medical consultation on behavioral health unit. Lab work demonstrated an acute kidney injury and leukocytosis. Her urinalysis was abnormal with mucus threads and WBCs. Her abdomen was distended.  She was given 1 liter of Isolyte. She remained withdrawn with limited conversation and unable to make her needs known.  She did not indicate any signs of pain on palpation. A CT of the chest abdomen and pelvis was obtained without contrast which was unremarkable.  Patient will be admitted to the medical unit for further evaluation and treatment of NICHOLAS and SIRS.      Review of Systems:  Review of Systems   Unable to perform ROS: Patient unresponsive       Past Medical and Surgical History:   Past Medical History:   Diagnosis Date    Cognitive impairment     Diabetes mellitus (HCC)     Psychosis (HCC)        Past Surgical History:   Procedure Laterality Date     SECTION      CHOLECYSTECTOMY         Meds/Allergies:  Prior to Admission medications    Medication Sig Start Date End Date Taking? Authorizing Provider   ALPRAZolam (XANAX) 0.25 mg tablet Take 0.25 mg by mouth daily at bedtime as needed for anxiety.    Historical Provider, MD   Biotin 300 MCG TABS Take by mouth.    Historical Provider, MD   Capsaicin-Menthol 0.025-10 % GEL Apply 1 small application topically 3 (three) times a day as needed (pain) 20   Milagros Brandon PA-C   clonazePAM (KlonoPIN) 1 mg tablet Take 1 mg by mouth 2 (two) times a day as needed for seizures.    Historical Provider, MD   metFORMIN (GLUCOPHAGE) 500 mg tablet  Take 500 mg by mouth 2 (two) times a day with meals.    Historical Provider, MD   PARoxetine (PAXIL) 30 mg tablet Take 30 mg by mouth 2 (two) times a day.    Historical Provider, MD MAURO have reviewed home medications with a medical source (PCP, Pharmacy, other).    Allergies: No Known Allergies    Social History:  Marital Status: Single   Patient Pre-hospital Level of Mobility: walks  Patient Pre-hospital Diet Restrictions: None  Substance Use History:   Social History     Substance and Sexual Activity   Alcohol Use No     Social History     Tobacco Use   Smoking Status Every Day    Current packs/day: 0.50    Types: Cigarettes   Smokeless Tobacco Never     Social History     Substance and Sexual Activity   Drug Use No       Family History:  No family history on file.    Physical Exam:     Vitals:        Physical Exam  Vitals and nursing note reviewed.   Constitutional:       General: She is not in acute distress.     Appearance: She is obese. She is ill-appearing and diaphoretic. She is not toxic-appearing.   HENT:      Head: Normocephalic.      Mouth/Throat:      Mouth: Mucous membranes are dry.   Eyes:      Conjunctiva/sclera: Conjunctivae normal.   Cardiovascular:      Rate and Rhythm: Tachycardia present.   Pulmonary:      Effort: Pulmonary effort is normal.      Breath sounds: Normal breath sounds.   Abdominal:      General: Bowel sounds are normal. There is distension.      Palpations: Abdomen is soft.      Tenderness: There is no abdominal tenderness. There is no guarding or rebound.   Musculoskeletal:         General: Normal range of motion.      Cervical back: Normal range of motion.      Right lower leg: No edema.      Left lower leg: No edema.   Skin:     General: Skin is warm.      Capillary Refill: Capillary refill takes less than 2 seconds.   Neurological:      Mental Status: She is alert. She is disoriented.   Psychiatric:         Mood and Affect: Affect is flat.         Behavior: Behavior is  withdrawn. Behavior is cooperative.         Cognition and Memory: Memory is impaired.          Additional Data:     Lab Results:  Results from last 7 days   Lab Units 07/03/24  1157   WBC Thousand/uL 14.89*   HEMOGLOBIN g/dL 14.8   HEMATOCRIT % 47.9*   PLATELETS Thousands/uL 378   SEGS PCT % 81*   LYMPHO PCT % 10*   MONO PCT % 8   EOS PCT % 0     Results from last 7 days   Lab Units 07/03/24  1155   SODIUM mmol/L 146   POTASSIUM mmol/L 4.0   CHLORIDE mmol/L 106   CO2 mmol/L 24   BUN mg/dL 33*   CREATININE mg/dL 1.88*   ANION GAP mmol/L 16*   CALCIUM mg/dL 10.4*   ALBUMIN g/dL 5.1*   TOTAL BILIRUBIN mg/dL 0.63   ALK PHOS U/L 101   ALT U/L 17   AST U/L 28   GLUCOSE RANDOM mg/dL 148*     Results from last 7 days   Lab Units 07/01/24  2049   INR  1.04     Results from last 7 days   Lab Units 07/03/24  1211 07/03/24  0555 07/03/24  0301 07/02/24 2021 07/02/24  0732   POC GLUCOSE mg/dl 134 135 104 88 116     Lab Results   Component Value Date    HGBA1C 6.4 (H) 05/03/2024    HGBA1C 6.3 (H) 11/16/2023    HGBA1C 6.8 (H) 10/06/2023           Lines/Drains:  Invasive Devices       Peripheral Intravenous Line  Duration             Peripheral IV 07/03/24 Right;Ventral (anterior) Forearm <1 day                        Imaging: Reviewed radiology reports from this admission including: chest CT scan and abdominal/pelvic CT  No orders to display       EKG and Other Studies Reviewed on Admission:   EKG: pending     ** Please Note: This note has been constructed using a voice recognition system. **

## 2024-07-03 NOTE — ASSESSMENT & PLAN NOTE
"-120 bpm  + murmur   Last ECHO 2021 at [facility] \"Left ventricle is normal in size. Moderate hypertrophy of the basal interventricular septum measuring 1.5 cm in diameter with mild hypertrophy of the basal inferior wall. There is flow acceleration at the level of the sigmoid hypertrophy without clear LVOT gradient without evidence of ALEXIS. Visually estimated LVEF is 55 to 60%. Wall motion cannot be accurately assessed. There is normal diastolic function.\"  Follow-up admit labs, admit EKG and urine culture  Encourage oral hydration   "

## 2024-07-03 NOTE — PROGRESS NOTES
07/03/24 0814   Team Meeting   Meeting Type Daily Rounds   Initial Conference Date 07/03/24   Next Conference Date 07/04/24   Team Members Present   Team Members Present Physician;Nurse;;   Physician Team Member Dr Holter, T. Gonzalez, CRNP   Nursing Team Member Clarissa   Care Management Team Member Anum   Social Work Team Member Ayse   Patient/Family Present   Patient Present No   Patient's Family Present No     1:1 continuous, ativan last night and worked slightly, running into walls, unable to be redirected, discharge 7/17 possible, seclusion was a timeout at 2 am for 15 minutes.

## 2024-07-03 NOTE — ASSESSMENT & PLAN NOTE
Lab Results   Component Value Date    HGBA1C 6.4 (H) 05/03/2024       Recent Labs     07/02/24  0732 07/02/24 2021 07/03/24  0301 07/03/24  0555   POCGLU 116 88 104 135       Blood Sugar Average: Last 72 hrs:  (P) 109  A1c 6.4  Hold home Metformin  Monitor ACCU checks AC + HS with lispro insulin sliding scale coverage    DVT ppx: heparin SC  GI ppx: PPI  Elevate RUE .   PICC in IR today (or tomorrow per IR ) for IV Vanco x 28 days (thru Dec 16th).

## 2024-07-03 NOTE — QUICK NOTE
CBC appears hemoconcentrated.  Will give Isolyte 1 L bolus now.  Will continue to follow-up remaining workup.

## 2024-07-03 NOTE — CONSULTS
"[facility]  Consult  Name: Meli Nowak 59 y.o. female I MRN: [id number]  Unit/Bed#: [facility] 644-01 I Date of Admission: 7/2/2024   Date of Service: 7/3/2024 I Hospital Day: 1    Inpatient consult for Medical Clearance for  patient  Consult performed by: JOSE ELIAS Ortega  Consult ordered by: JOSE ELIAS Figueroa        Assessment & Plan   Medical clearance for psychiatric admission  Assessment & Plan  Admission labs: CBC, CMP, lipid panel, TSH pending (nursing to call nursing supervisor to attempt)  Reviewed labs from 7/1/24 which are acceptable   Folate, B12, Vitamin D 25 hydroxy labs pending  Vitals stable aside from tachycardia, please see below   UA abnormal, check urine culture    UDS positive for benzos   EKG pending   Patient is medically cleared for admission to [facility] and treatment of underlying psychiatric illness based on available results  Please contact SLIM with any questions or concerns    Tachycardia  Assessment & Plan  -120 bpm  Diaphoretic  Did have HTN  Slightly rigid  + murmur   Last ECHO 2021 at [facility] \"Left ventricle is normal in size. Moderate hypertrophy of the basal interventricular septum measuring 1.5 cm in diameter with mild hypertrophy of the basal inferior wall. There is flow acceleration at the level of the sigmoid hypertrophy without clear LVOT gradient without evidence of ALEXIS. Visually estimated LVEF is 55 to 60%. Wall motion cannot be accurately assessed. There is normal diastolic function.\"  ? Atypical NMS  Spoke with primary service   Follow-up admit labs, CK, ammonia, ESR, CRP, EKG, CXR and urine culture  Encourage oral hydration     Reactive airway disease  Assessment & Plan  Stable, no signs of exacerbation  Continue home Arnuity and Albuterol PRN     Hyperlipidemia  Assessment & Plan  Continue home statin  Follow-up lipid panel     Tobacco abuse  Assessment & Plan  Smoking cessation education and counseling   Will offer Nicotine " patch while hospitalized once she is answering questions     Psychosis (HCC)  Assessment & Plan  Admitted to [facility]  Management per primary service     Obesity  Assessment & Plan  Would benefit from weight loss and therapeutic lifestyle changes  Education and counseling as tolerated   Consider nutrition evaluation       Type 2 diabetes mellitus (HCC)  Assessment & Plan  Lab Results   Component Value Date    HGBA1C 6.4 (H) 05/03/2024       Recent Labs     07/02/24  0732 07/02/24  2021 07/03/24  0301 07/03/24  0555   POCGLU 116 88 104 135         Blood Sugar Average: Last 72 hrs:  (P) 109  A1c 6.4  Hold home Metformin  Monitor ACCU checks AC + HS with lispro insulin sliding scale coverage            Recommendations for Discharge:  SLIM will sign off - please call with questions or concerns.  Follow up with PCP upon discharge.     Counseling / Coordination of Care Time: 30 minutes.  Greater than 50% of total time spent on patient counseling and coordination of care.    Collaboration of Care: Were Recommendations Directly Discussed with Primary Treatment Team? - Yes     History of Present Illness:    Meli Nowak is a 59 y.o. female with a PMH including T2DM, schizophrenia, HLD, tobacco use, endometrial cancer s/p surgical bulking, PTSD, morbid obesity who is originally admitted to the psychiatric service due to behavorial disturbance. We are consulted for medical clearance for psychiatric hospitalization and medical management. Patient was taken to [facility] ED via police after being found sitting on a bench with bizarre behavior. Patient was a poor historian in the ED. She appeared disheveled, paranoid and like she was responding to internal stimuli. She was seen by Crisis. Ultimately she was involuntarily committed by a 2 physician 302 for her inability to care for herself or keep herself safe. Patient was transferred to [facility] for [facility]. Currently, she is resting out of bed in chair. She makes eye  "contact and follows simple commands but does not speak. Per nursing, she drank 2 cups of water but did not eat breakfast.     Review of Systems:    Review of Systems   Unable to perform ROS: Psychiatric disorder       Past Medical and Surgical History:     Past Medical History:   Diagnosis Date    Cognitive impairment     Diabetes mellitus (HCC)     Psychosis (HCC)        Past Surgical History:   Procedure Laterality Date     SECTION      CHOLECYSTECTOMY         Meds/Allergies:    all medications and allergies reviewed    Allergies: No Known Allergies    Social History:     Marital Status: Single    Substance Use History:   Social History     Substance and Sexual Activity   Alcohol Use No     Social History     Tobacco Use   Smoking Status Every Day    Current packs/day: 0.50    Types: Cigarettes   Smokeless Tobacco Never     Social History     Substance and Sexual Activity   Drug Use No       Family History:    No family history on file.    Physical Exam:     Vitals:   Blood Pressure: 126/76 (24 1051)  Pulse: (!) 118 (24 1051)  Temperature: 97.7 °F (36.5 °C) (24 1051)  Temp Source: Temporal (24 1051)  Respirations: 20 (24 0618)  Height: 5' 8\" (172.7 cm) (24 1930)  SpO2: 95 % (24 1051)    Physical Exam  Vitals and nursing note reviewed.   Constitutional:       General: She is not in acute distress.     Appearance: She is obese. She is ill-appearing and diaphoretic. She is not toxic-appearing.   HENT:      Head: Normocephalic.      Mouth/Throat:      Mouth: Mucous membranes are moist.   Eyes:      Conjunctiva/sclera: Conjunctivae normal.   Cardiovascular:      Rate and Rhythm: Tachycardia present.      Heart sounds: Murmur heard.   Pulmonary:      Effort: Pulmonary effort is normal. No respiratory distress.      Breath sounds: Normal breath sounds. No wheezing, rhonchi or rales.   Abdominal:      General: Bowel sounds are normal. There is no distension.      " Palpations: Abdomen is soft.      Tenderness: There is no abdominal tenderness.   Musculoskeletal:         General: Normal range of motion.      Cervical back: Normal range of motion.      Right lower leg: No edema.      Left lower leg: No edema.   Skin:     General: Skin is warm.      Capillary Refill: Capillary refill takes less than 2 seconds.   Neurological:      Mental Status: She is alert. She is disoriented and confused.   Psychiatric:         Mood and Affect: Mood is depressed. Affect is flat.         Behavior: Behavior is withdrawn.         Cognition and Memory: Memory is impaired.      Comments: Not speaking          Additional Data:     Lab Results:     Results from last 7 days   Lab Units 07/01/24 2049   WBC Thousand/uL 9.82   HEMOGLOBIN g/dL 13.8   HEMATOCRIT % 43.2   PLATELETS Thousands/uL 334   SEGS PCT % 68   LYMPHO PCT % 22   MONO PCT % 9   EOS PCT % 0     Results from last 7 days   Lab Units 07/01/24 2049   SODIUM mmol/L 142   POTASSIUM mmol/L 4.3   CHLORIDE mmol/L 104   CO2 mmol/L 29   BUN mg/dL 11   CREATININE mg/dL 0.99   ANION GAP mmol/L 9   CALCIUM mg/dL 9.9   ALBUMIN g/dL 4.7   TOTAL BILIRUBIN mg/dL 0.49   ALK PHOS U/L 110*   ALT U/L 15   AST U/L 19   GLUCOSE RANDOM mg/dL 93     Results from last 7 days   Lab Units 07/01/24 2049   INR  1.04         Lab Results   Component Value Date/Time    HGBA1C 6.4 (H) 05/03/2024 02:07 PM    HGBA1C 6.3 (H) 11/16/2023 02:37 PM    HGBA1C 6.8 (H) 10/06/2023 03:01 PM     Results from last 7 days   Lab Units 07/03/24  0555 07/03/24  0301 07/02/24 2021 07/02/24  0732   POC GLUCOSE mg/dl 135 104 88 116           Imaging: I have personally reviewed pertinent reports.      XR chest portable    (Results Pending)       EKG, Pathology, and Other Studies Reviewed on Admission:   EKG: see above documentation    ** Please Note: This note has been constructed using a voice recognition system. **

## 2024-07-03 NOTE — TREATMENT TEAM
07/03/24 0140   Powell Anxiety Scale   Anxious Mood 4   Tension 4   Fears 4   Insomnia 2   Intellectual 2   Depressed Mood 2   Somatic Complaints: Muscular 1   Somatic Complaints: Sensory 1   Cardiovascular Symptoms 0   Respiratory Symptoms 0   Gastrointestinal Symptoms 0   Genitourinary Symptoms 1   Autonomic Symptoms 3   Behavior at Interview 3   Powell Anxiety Score 27     Patient medicated for severe anxiety

## 2024-07-03 NOTE — ASSESSMENT & PLAN NOTE
Abdominal distention on exam, no signs of pain or discomfort with palpation, suspecting hernia   CT A/P unremarkable   Will hold off on starting flagyl at this time   Bowel regimen   I+O, PVRs

## 2024-07-03 NOTE — NURSING NOTE
Meli is incontinent of urine and a complete care. Patient appears to have internal stimulation. Patient paranoid and suspicious. Patient c/o being scared and is resistant to care, eating and drinking juice. Drank water but refused PO Xanax for anxiety. Minimal words spoken. Ativan administered IM as per prn orders. Will continue to monitor.

## 2024-07-03 NOTE — ASSESSMENT & PLAN NOTE
Urinalysis abnormal with mucus threads and WBCs  Follow-up urine culture  Okay to straight cath if needed for sample  Empirically start IV Rocephin

## 2024-07-03 NOTE — ASSESSMENT & PLAN NOTE
POA, as evidenced by creatinine 1.8 with a baseline near 0.9, likely prerenal due to dehydration  Given 1 L of Isolyte  Will start isolate at 100 mL/hr per hour  Limit nephrotoxic agents and avoid hypotension  Monitor I&O and PVRs  Creatinine in the morning

## 2024-07-03 NOTE — PLAN OF CARE
Pt did not attend any groups when prompted or offered.   Problem: Alteration in Thoughts and Perception  Goal: Attend and participate in unit activities, including therapeutic, recreational, and educational groups  Description: Interventions:  -Encourage Visitation and family involvement in care  Outcome: Not Progressing

## 2024-07-03 NOTE — NURSING NOTE
"Pt admitted to the unit from [facility] ED. Pt is admitted under a 302 status signed today 7/2. Pt did not answer even a single admission question and thus, unable to complete admission. Incontinent of bowel on admission. Refused getting out of bed. Did not sign any admission papers.  Unable to verify PTA meds.     Per ED Provider's/crisis report: Patient is a 59-year-old female coming in via police stating \"paranoid and making no sense\".  Police were not available upon my evaluation patient.  Patient resting in bed keeps stating that she is scared or \"I just was brought here to sleep.  Can you turn the AC on\".  Patient repeats these phrases several times.  Patient tells us that she lives in an apartment with a cat.  She remembers sitting out on a bench smoking 2 cigarettes around 330.  Patient cannot tell us much else at this time as she keeps stating that she is here to rest.  She does repeat several times that she is scared.  Upon further questioning patient does not answer.   Chart review shows patient was seen here on June 28 and patient has a history of agoraphobia with panic disorder, schizoaffective disorder, diabetes.  When I asked her about her diabetes she states \"I feel my face and outfield diabetic\".  I asked her about other medication and states that she takes tablets but cannot tell me how much or when.  She cannot tell me the last time she took this.  "

## 2024-07-03 NOTE — NURSING NOTE
Pt remains confused and diaphoretic this shift. Pt unable to follow commands at times and does not speak . Pt  remains on continues 1:1. Pt was seen by SLIM and orders put in for labs and bolus stated. Will maintain q 7 mins.

## 2024-07-03 NOTE — ASSESSMENT & PLAN NOTE
POA, as evidenced by tachycardia and leukocytosis  No source of infection but suspicious for UTI or intraabdominal infection   CT C/A/P  Empirically start IV Rocephin and Flagyl  Follow-up up blood and urine cultures, Pro-Yves and lactic acidosis  Trend fever curve and CBC with differential

## 2024-07-03 NOTE — ASSESSMENT & PLAN NOTE
Lab Results   Component Value Date    HGBA1C 6.4 (H) 05/03/2024       Recent Labs     07/02/24 2021 07/03/24  0301 07/03/24  0555 07/03/24  1211   POCGLU 88 104 135 134       Blood Sugar Average: Last 72 hrs:  A1c 6.4  Hold home Metformin  Monitor ACCU checks AC + HS with lispro insulin sliding scale coverage

## 2024-07-03 NOTE — CASE MANAGEMENT
CM met with patient to review pt rights; pt confused, guarded, paranoid; pt does NOT appear to understand her rights at this time; PT was non verbal and appeared scared. SUZIE marked 303 paperwork as such and emailed to [facility], hearing will be Friday 7/5/24 at 8 am.

## 2024-07-03 NOTE — PLAN OF CARE
Problem: PSYCHOSIS  Goal: Will report no hallucinations or delusions  Description: Interventions:  - Administer medication as  ordered  - Every waking shifts and PRN assess for the presence of hallucinations and or delusions  - Assist with reality testing to support increasing orientation  - Assess if patient's hallucinations or delusions are encouraging self-harm or harm to others and intervene as appropriate  Outcome: Not Progressing     Problem: BEHAVIOR  Goal: Pt/Family maintain appropriate behavior and adhere to behavioral management agreement, if implemented  Description: INTERVENTIONS:  - Assess the family dynamic   - Encourage verbalization of thoughts and concerns in a socially appropriate manner  - Assess patient/family's coping skills and non-compliant behavior (including use of illegal substances).  - Utilize positive, consistent limit setting strategies supporting safety of patient, staff and others  - Initiate consult with Case Management, Spiritual Care or other ancillary services as appropriate  - If a patient's/visitor's behavior jeopardizes the safety of the patient, staff, or others, refer to organization procedure.   - Notify Security of behavior or suspected illegal substances which indicate the need for search of the patient and/or belongings  - Encourage participation in the decision making process about a behavioral management agreement; implement if patient meets criteria  Outcome: Not Progressing     Problem: SELF CARE DEFICIT  Goal: Return ADL status to a safe level of function  Description: INTERVENTIONS:  - Administer medication as ordered  - Assess ADL deficits and provide assistive devices as needed  - Obtain PT/OT consults as needed  - Assist and instruct patient to increase activity and self care as tolerated  Outcome: Not Progressing

## 2024-07-03 NOTE — TREATMENT TEAM
07/02/24 6730   Provider Notification   Reason for Communication Admission   Provider Name Josiane Evangelista   Provider Role Hospitalist   Method of Communication Other (Comment)  (secure chat)   Response See orders   Notification Time 2218   Shift Event Other (Comment)  (admission/DM pt)   Jacoby Scale   Sensory Perceptions 3   Moisture 3   Activity 1   Mobility 2   Nutrition 1   Friction and Shear 2   Jacoby Scale Score 12

## 2024-07-03 NOTE — NURSING NOTE
Patient calmer and less diaphoretic. Patient taken out of unlocked seclusion and back to her room where she continue to be confused and responding. Patient can follow minimal commands but is wandering around the room. Dr Hummel agreed to 1:1 for safety. Will continue to monitor.

## 2024-07-03 NOTE — PROGRESS NOTES
07/03/24 1514   Referral Data   Referral Reason Psych   County Information   County of Residence [place]   Readmission Root Cause   30 Day Readmission No   Patient Information   Mental Status Confused   Primary Caregiver Self   Support System Other (Comment)  (unknown)   [employer]/Cultural Requests unknown   Legal Information   Tx Plan Signed No (Comment)  (not done yet by provider)   Current Status: 302   Health Care Proxy Appointed No   Activities of Daily Living Prior to Admission   Functional Status Moderate assistance   Assistive Device No device needed   Living Arrangement Other (Comment)  (living situation unknown)   Ambulation Minimum assistance   Access to Firearms   Access to Firearms   (unknown)   Income Information   Income Source   (unknown)   Means of Transportation   Means of Transport to Appts:   (unknown)

## 2024-07-03 NOTE — DISCHARGE SUMMARY
"Discharge Summary - Behavioral Health   Meli Nowak 59 y.o. female MRN: [id number]  Unit/Bed#: [facility] 644-01 Encounter: [id number]     Admission Date:   Admission Orders (From admission, onward)       Ordered        07/02/24 1952  ED TO DIFFERENT CAMPUS [facility] UNIT or INPATIENT MEDICAL UNIT to [facility] UNIT (using Discharge Readmit Navigator) - Admit Patient to IP Behavioral Health Unit  Once                                Discharge Date: 07/03/24     Attending Psychiatrist: Dr. Jordan Holter DO    Admission Diagnosis:    Active Problems:    Medical clearance for psychiatric admission    Type 2 diabetes mellitus (HCC)    Obesity    Psychosis (HCC)    Tobacco abuse    Hyperlipidemia    Reactive airway disease    NICHOLAS (acute kidney injury) (HCC)    SIRS (systemic inflammatory response syndrome) (HCC)    Abnormal urinalysis    Abdominal distension      Discharge Diagnosis:     Active Problems:    Medical clearance for psychiatric admission    Type 2 diabetes mellitus (HCC)    Obesity    Psychosis (HCC)    Tobacco abuse    Hyperlipidemia    Reactive airway disease    NICHOLAS (acute kidney injury) (HCC)    SIRS (systemic inflammatory response syndrome) (HCC)    Abnormal urinalysis    Abdominal distension  Resolved Problems:    * No resolved hospital problems. *      Reason for Admission/HPI:   Meli Nowak is a 59 y.o.  female, with an unknown living situation, with a PPHx of agoraphobia, schizoaffective disorder per chart review, and PMHx of type 2 diabetes who presented to [facility] due to paranoia. Patient was admitted to the Behavioral Health Unit on a involuntary 303 commitment basis due to signs of acute psychosis, psychotic symptoms, paranoid ideation, and odd behavior.    ED Note:  \"Patient is a 59-year-old female coming in via police stating \"paranoid and making no sense\".  Police were not available upon my evaluation patient.  Patient resting in bed keeps stating that she is scared or \"I just was brought here to " "sleep.  Can you turn the AC on\".  Patient repeats these phrases several times.  Patient tells us that she lives in an apartment with a cat.  She remembers sitting out on a bench smoking 2 cigarettes around 330.  Patient cannot tell us much else at this time as she keeps stating that she is here to rest.  She does repeat several times that she is scared.  Upon further questioning patient does not answer.        Chart review shows patient was seen here on June 28 and patient has a history of agoraphobia with panic disorder, schizoaffective disorder, diabetes.  When I asked her about her diabetes she states \"I feel my face and outfield diabetic\".  I asked her about other medication and states that she takes tablets but cannot tell me how much or when.  She cannot tell me the last time she took this.     Patient is very padded and states that she is here to sleep and very tired that no one understands.\"    Crisis worker note:  \"Patient arrives via [employer].     CIS met with patient to attempt to complete Crisis Intake and Safety Risk Assessment.      Patient is not oriented to person, place, time or situation. Patient unable to identify where she lives, the city she is in, how or why she was brought to the hospital. Patient does not appear to be at baseline based on chart review.     Patient is exhibiting rapid eye movement and appears to be responding to internal stimuli.     Patient continues to repeat that she is tired, and attempts to go to sleep. Patient is able to be awaken. Patient is unable to identify why she was brought to the hospital. Patient reports she was \"sitting on the bench at 3:30\", but does not continue the thought. Patient reports her cat is at home and needs fresh water. Patient reports having an ICM, but does not identify who this person is or where they work.     Patient does deny suicidal ideation, homicidal ideation and auditory/visual/tactile hallucinations.\"    Inpatient U H&P conducted by " "Shan Fitzgerald, DO:  \"Meli Nowak is a 59 y.o.  female, with an unknown living situation, with a PPHx of agoraphobia, schizoaffective disorder per chart review, and PMHx of type 2 diabetes who presented to [facility] due to paranoia. Patient was admitted to the Behavioral Health Unit on a involuntary 303 commitment basis due to signs of acute psychosis, psychotic symptoms, paranoid ideation, and odd behavior.     Symptoms prior to admission included disorganized behavior, disorganized thinking process, poor self-care, and change in mental status. Onset of symptoms was abrupt starting a few days ago with rapidly worsening course since that time.      On initial evaluation after admission to the inpatient psychiatric unit, Meli displayed a flat affect, was disheveled with poor grooming, nonverbal, disorganized, responding to internal stimuli, internally preoccupied, paranoid, diaphoretic, and displaying bizarre movements including shuffling gait and rigidness in extremities.  Patient would not respond to any questioning.  She was difficult to redirect and appeared to be paranoid of this note writer.  Patient was walking without purpose and reaching for things on the wall.  She required multiple attempts at redirection to sit in his seat which patient only did briefly before getting up and beginning to walk away  without answering questions.  Per chart review patient was found by PD sitting on a park bench appearing \"very paranoid and making no sense.\"  Patient in emergency department was also not answering questions and just kept repeatedly saying \"I am scared.\"  Per chart review patient had been prescribed clozapine and it is unknown if she had been taking it.  Clozapine level on admission was 134.      After attempting to interview patient case was discussed with medical team.  Patient displayed concerns for catatonia versus NMS versus cholinergic rebound syndrome.  Further lab work demonstrated patient met SIRS " "criteria due to leukocytosis and tachycardia.  Patient was afebrile but also was hypertensive.  CRP and CK were elevated.  Patient also had an NICHOLAS.  Based on patient meeting SIRS criteria, having a new NICHOLAS, and having concerns for possible NMS versus catatonia versus cholinergic rebound it was determined that patient should be transferred to medical floor.\"       Past Medical History:   Diagnosis Date    Cognitive impairment     Diabetes mellitus (HCC)     Psychosis (HCC)      Past Surgical History:   Procedure Laterality Date     SECTION      CHOLECYSTECTOMY         Medications:    All current active medications have been reviewed.    Allergies:     No Known Allergies    Please refer to the initial H&P for full details.      Vital signs in last 24 hours:    Temp:  [97.7 °F (36.5 °C)-99 °F (37.2 °C)] 97.7 °F (36.5 °C)  HR:  [] 118  Resp:  [17-20] 20  BP: (126-167)/(0-95) 126/76      Intake/Output Summary (Last 24 hours) at 7/3/2024 1530  Last data filed at 7/3/2024 1301  Gross per 24 hour   Intake 12 ml   Output --   Net 12 ml         Hospital Course:   On admission, Meli was admitted to the inpatient psychiatric unit and started on Behavioral Health checks every 7 minutes. During the hospitalization she was encouraged to attend individual therapy, group therapy, milieu therapy and occupational therapy.  Upon admission Meli was seen by medical service for medical clearance for inpatient treatment and medical follow up.    Meli was started on Zyprexa 5 mg daily.  Ona's medications were titrated as appropriate until discharge, including:    Discontinued Zyprexa 5 mg at this time due to concern for NMS  Initiated Ativan 0.5 mg 4 times daily due to concern for atypical NMS versus catatonia    Prior to beginning of treatment medications risks and benefits and possible side effects including risk of parkinsonian symptoms, Tardive Dyskinesia and metabolic syndrome related to treatment with antipsychotic " medications, risk of cardiovascular events in elderly related to treatment with antipsychotic medications, and risks of misuse, abuse or dependence, sedation and respiratory depression related to treatment with benzodiazepine medications were not reviewed with Meli. Meli was nonverbal during admission and was unable to verbalize understanding of medications.  Meli tolerated these medications with no acute side effects.  Meli was determined to be medically unstable and was discharged to the medical unit.    On the day of discharge,  Meli displayed a flat affect, was disheveled with poor grooming, nonverbal, disorganized, responding to internal stimuli, internally preoccupied, paranoid, diaphoretic, and displaying bizarre movements including shuffling gait and rigidness in extremities.  Patient would not respond to any questioning.  She was difficult to redirect and appeared to be paranoid of this note writer.  Patient was walking without purpose and reaching for things on the wall.  She required multiple attempts at redirection to sit in his seat which patient only did briefly before getting up and beginning to walk away  without answering questions.     Meli was transferred to medical floor on 07/03/2024 for further medical stabilization with a plan to reassess for the need to return to the psychiatric unit once medically stable. Meli was going to be followed on Psychiatric Consultation service while on medical service. Recommend return to inpatient psychiatric unit unless deemed unnecessary by consult service.    Behavioral Health Medications: all current active meds have been reviewed.  Discharge on Two Antipsychotic Medications : No    Labs/Imaging:   I have personally reviewed all pertinent laboratory/tests results.    Mental Status Evaluation:  Appearance:  disheveled, wearing hospital clothes, poor hygiene, overweight, diaphoretic, facial cream on cheeks,  female   Behavior:  bizarre, uncooperative,  restless, mute   Speech:  non-verbal   Mood:  Unable to assess due to patient factors   Affect:  flat   Language: unable to assess   Thought Process:  unable to assess   Associations: unable to assess - non-verbal   Thought Content:  Unable to assess due to patient factors but patient appeared paranoid   Perceptual Disturbances: Unable to assess if patient was experiencing auditory or visual hallucinations.  Patient did appear to be responding to internal stimuli and to be internally preoccupied   Risk Potential: Suicidal ideation - unable to assess  Homicidal ideation - unable to assess  Potential for aggression - Not at present   Sensorium:  unable to assess   Memory:  patient does not answer   Consciousness:  alert and awake   Attention/Concentration: poor concentration and poor attention span   Intellect: unable to assess due to lack of cooperation   Fund of Knowledge: Unable to assess   Insight:  poor   Judgment: poor   Muscle Strength Muscle Tone: Increased muscle rigidity in extremities   Gait/Station: unstable gait   Motor Activity: no abnormal movements     Suicide/Homicide Risk Assessment:  Risk of Harm to Self:   The following ratings are based on assessment at the time of the interview  Nursing Suicide Risk Assessment Last 24 hours: C-SSRS Risk (Since Last Contact)  Calculated C-SSRS Risk Score (Since Last Contact): No Risk Indicated  Demographic risk factors include: , age: over 50 or older  Historical Risk Factors include: chronic psychiatric problems, history of psychosis  Current Specific Risk Factors include: current psychotic symptoms, presence of paranoid ideation, poor impulse control, recent inpatient psychiatric admission  Protective Factors: no current protective factors  Weapons/Firearms:  Unknown due to patient factors . The following steps have been taken to ensure weapons are properly secured: not applicable  Based on today's assessment, Meli presents the following risk of harm to  self: moderate     Risk of Harm to Others:  The following ratings are based on assessment at the time of the interview  Nursing Homicide Risk Assessment: Violence Risk to Others: Unable to assess- Unwilling or unable to answer questions (Comment)  Demographic Risk Factors include: none.  Historical Risk Factors include: none.  Current Specific Risk Factors include: poor impulse control, behavior suggesting impulsivity, behavior suggesting loss of control, current psychotic symptoms  Protective Factors: no current homicidal ideation  Based on today's assessment, Raphine presents the following risk of harm to others: minimal    The following interventions are recommended: psychiatric follow up on Psychiatric Consultation Service while on medical floor    Discharge Medications:  See list above, as well as the after visit summary for all reconciled discharge medications provided to patient and family.      Discharge instructions/Information to patient and family:   See after visit summary for information provided to patient and family.      Provisions for Follow-Up Care:  See after visit summary for information related to follow-up care and any pertinent home health orders.        Shan Fitzgerald DO 07/03/24  Psychiatry Resident, PGY-II    This note was completed in part utilizing Dragon dictation Software. Grammatical, translation, syntax errors, random word insertions, spelling mistakes, and incomplete sentences may be an occasional consequence of this system secondary to software limitations with voice recognition, ambient noise, and hardware issues. If you have any questions or concerns about the content, text, or information contained within the body of this dictation, please contact the provider for clarification.

## 2024-07-03 NOTE — ASSESSMENT & PLAN NOTE
Initially admitted to [place] inpatient behavioral health unit.  There was concern for atypical NMS given diaphoresis, tachycardia, episode of hypertension, and mild rigidity.  Her blood pressure and rigidity has improved.  Her CK is mildly elevated likely from rhabdomyolysis.  Currently, she is not meeting NMS criteria per psychiatry  Continue one-to-one  Appreciate psychiatry input

## 2024-07-03 NOTE — ASSESSMENT & PLAN NOTE
Lab Results   Component Value Date    HGBA1C 6.4 (H) 05/03/2024       Recent Labs     07/02/24  0732 07/02/24 2021 07/03/24  0301 07/03/24  0555   POCGLU 116 88 104 135         Blood Sugar Average: Last 72 hrs:  (P) 109  A1c 6.4  Hold home Metformin  Monitor ACCU checks AC + HS with lispro insulin sliding scale coverage

## 2024-07-03 NOTE — NURSING NOTE
Patient is psychotic and wandering. Appears to be responding. Just put her in unlock seclusion and gave Zyprexa 5 mg IM. She is diaphoretic, Hypertensive and tachycardic, both medical and psych physicians notified and awaiting orders

## 2024-07-03 NOTE — ASSESSMENT & PLAN NOTE
Admission labs: CBC, CMP, lipid panel, TSH pending (nursing to call nursing supervisor to attempt)  Reviewed labs from 7/1/24 which are acceptable   Folate, B12, Vitamin D 25 hydroxy labs pending  Vitals stable aside from tachycardia, please see below   UA abnormal, check urine culture    UDS positive for benzos   EKG pending   Patient is medically cleared for admission to U and treatment of underlying psychiatric illness based on available results  Please contact [facility] with any questions or concerns

## 2024-07-03 NOTE — PLAN OF CARE
Problem: Prexisting or High Potential for Compromised Skin Integrity  Goal: Skin integrity is maintained or improved  Description: INTERVENTIONS:  - Identify patients at risk for skin breakdown  - Assess and monitor skin integrity  - Assess and monitor nutrition and hydration status  - Monitor labs   - Assess for incontinence   - Turn and reposition patient  - Assist with mobility/ambulation  - Relieve pressure over bony prominences  - Avoid friction and shearing  - Provide appropriate hygiene as needed including keeping skin clean and dry  - Evaluate need for skin moisturizer/barrier cream  - Collaborate with interdisciplinary team   - Patient/family teaching  - Consider wound care consult   Outcome: Progressing     Problem: PAIN - ADULT  Goal: Verbalizes/displays adequate comfort level or baseline comfort level  Description: Interventions:  - Encourage patient to monitor pain and request assistance  - Assess pain using appropriate pain scale  - Administer analgesics based on type and severity of pain and evaluate response  - Implement non-pharmacological measures as appropriate and evaluate response  - Consider cultural and social influences on pain and pain management  - Notify physician/advanced practitioner if interventions unsuccessful or patient reports new pain  Outcome: Progressing     Problem: INFECTION - ADULT  Goal: Absence or prevention of progression during hospitalization  Description: INTERVENTIONS:  - Assess and monitor for signs and symptoms of infection  - Monitor lab/diagnostic results  - Monitor all insertion sites, i.e. indwelling lines, tubes, and drains  - Monitor endotracheal if appropriate and nasal secretions for changes in amount and color  - [place] appropriate cooling/warming therapies per order  - Administer medications as ordered  - Instruct and encourage patient and family to use good hand hygiene technique  - Identify and instruct in appropriate isolation precautions for  identified infection/condition  Outcome: Progressing  Goal: Absence of fever/infection during neutropenic period  Description: INTERVENTIONS:  - Monitor WBC    Outcome: Progressing     Problem: SAFETY ADULT  Goal: Patient will remain free of falls  Description: INTERVENTIONS:  - Educate patient/family on patient safety including physical limitations  - Instruct patient to call for assistance with activity   - Consult OT/PT to assist with strengthening/mobility   - Keep Call bell within reach  - Keep bed low and locked with side rails adjusted as appropriate  - Keep care items and personal belongings within reach  - Initiate and maintain comfort rounds  - Make Fall Risk Sign visible to staff  - Apply yellow socks and bracelet for high fall risk patients  - Consider moving patient to room near nurses station  Outcome: Progressing  Goal: Maintain or return to baseline ADL function  Description: INTERVENTIONS:  -  Assess patient's ability to carry out ADLs; assess patient's baseline for ADL function and identify physical deficits which impact ability to perform ADLs (bathing, care of mouth/teeth, toileting, grooming, dressing, etc.)  - Assess/evaluate cause of self-care deficits   - Assess range of motion  - Assess patient's mobility; develop plan if impaired  - Assess patient's need for assistive devices and provide as appropriate  - Encourage maximum independence but intervene and supervise when necessary  - Involve family in performance of ADLs  - Assess for home care needs following discharge   - Consider OT consult to assist with ADL evaluation and planning for discharge  - Provide patient education as appropriate  Outcome: Progressing  Goal: Maintains/Returns to pre admission functional level  Description: INTERVENTIONS:  - Perform AM-PAC 6 Click Basic Mobility/ Daily Activity assessment daily.  - Set and communicate daily mobility goal to care team and patient/family/caregiver.   - Collaborate with rehabilitation  services on mobility goals if consulted  - Out of bed for toileting  - Record patient progress and toleration of activity level   Outcome: Progressing     Problem: DISCHARGE PLANNING  Goal: Discharge to home or other facility with appropriate resources  Description: INTERVENTIONS:  - Identify barriers to discharge w/patient and caregiver  - Arrange for needed discharge resources and transportation as appropriate  - Identify discharge learning needs (meds, wound care, etc.)  - Arrange for interpretive services to assist at discharge as needed  - Refer to Case Management Department for coordinating discharge planning if the patient needs post-hospital services based on physician/advanced practitioner order or complex needs related to functional status, cognitive ability, or social support system  Outcome: Progressing     Problem: Knowledge Deficit  Goal: Patient/family/caregiver demonstrates understanding of disease process, treatment plan, medications, and discharge instructions  Description: Complete learning assessment and assess knowledge base.  Interventions:  - Provide teaching at level of understanding  - Provide teaching via preferred learning methods  Outcome: Progressing

## 2024-07-03 NOTE — PLAN OF CARE
Problem: PSYCHOSIS  Goal: Will report no hallucinations or delusions  Description: Interventions:  - Administer medication as  ordered  - Every waking shifts and PRN assess for the presence of hallucinations and or delusions  - Assist with reality testing to support increasing orientation  - Assess if patient's hallucinations or delusions are encouraging self-harm or harm to others and intervene as appropriate  Reactivated     Problem: BEHAVIOR  Goal: Pt/Family maintain appropriate behavior and adhere to behavioral management agreement, if implemented  Description: INTERVENTIONS:  - Assess the family dynamic   - Encourage verbalization of thoughts and concerns in a socially appropriate manner  - Assess patient/family's coping skills and non-compliant behavior (including use of illegal substances).  - Utilize positive, consistent limit setting strategies supporting safety of patient, staff and others  - Initiate consult with Case Management, Spiritual Care or other ancillary services as appropriate  - If a patient's/visitor's behavior jeopardizes the safety of the patient, staff, or others, refer to organization procedure.   - Notify Security of behavior or suspected illegal substances which indicate the need for search of the patient and/or belongings  - Encourage participation in the decision making process about a behavioral management agreement; implement if patient meets criteria  Reactivated     Problem: INVOLUNTARY ADMIT  Goal: Will cooperate with staff recommendations and doctor's orders and will demonstrate appropriate behavior  Description: INTERVENTIONS:  - Treat underlying conditions and offer medication as ordered  - Educate regarding involuntary admission procedures and rules  - Utilize positive consistent limit setting strategies to support patient and staff safety  Reactivated     Problem: SELF CARE DEFICIT  Goal: Return ADL status to a safe level of function  Description: INTERVENTIONS:  -  Administer medication as ordered  - Assess ADL deficits and provide assistive devices as needed  - Obtain PT/OT consults as needed  - Assist and instruct patient to increase activity and self care as tolerated  Reactivated

## 2024-07-03 NOTE — ASSESSMENT & PLAN NOTE
Initially admitted to [place] inpatient behavioral health unit   Transferred to medical unit due to NICHOLAS and SIRS  Consult psychiatry   Continue one-to-one

## 2024-07-03 NOTE — ASSESSMENT & PLAN NOTE
POA, as evidenced by tachycardia and leukocytosis  No source of infection but suspicious for UTI   CT C/A/P unremarkable   Empirically start IV Rocephin  Follow-up up blood and urine cultures, Pro-Yves and lactic acidosis  Trend fever curve and CBC with differential

## 2024-07-03 NOTE — ASSESSMENT & PLAN NOTE
Admission labs: CBC, CMP, lipid panel, TSH pending (nursing to call nursing supervisor to attempt)  Reviewed labs from 7/1/24 which are acceptable   Folate, B12, Vitamin D 25 hydroxy labs pending  Vitals stable aside from tachycardia   UA abnormal, check urine culture    UDS positive for benzos   EKG pending   Patient is medically cleared for admission to U and treatment of underlying psychiatric illness based on available results  Please contact [facility] with any questions or concerns

## 2024-07-03 NOTE — ASSESSMENT & PLAN NOTE
Smoking cessation education and counseling   Will offer Nicotine patch while hospitalized once she is answering questions

## 2024-07-03 NOTE — ASSESSMENT & PLAN NOTE
Abdominal distention on exam, no signs of pain or discomfort with palpation  CT A/P  Bowel regimen   I+O, PVRs

## 2024-07-03 NOTE — ASSESSMENT & PLAN NOTE
"-120 bpm  Diaphoretic  Did have HTN  Slightly rigid  + murmur   Last ECHO 2021 at [facility] \"Left ventricle is normal in size. Moderate hypertrophy of the basal interventricular septum measuring 1.5 cm in diameter with mild hypertrophy of the basal inferior wall. There is flow acceleration at the level of the sigmoid hypertrophy without clear LVOT gradient without evidence of ALEXIS. Visually estimated LVEF is 55 to 60%. Wall motion cannot be accurately assessed. There is normal diastolic function.\"  ? Atypical NMS  Spoke with primary service   Follow-up admit labs, CK, ammonia, ESR, CRP, EKG, CXR and urine culture  Encourage oral hydration   "

## 2024-07-03 NOTE — CASE MANAGEMENT
Case Management Assessment    Patient name Meli Nowak  Location /[facility] 644-01 MRN [id number]  : 1965 Date 7/3/2024       Current Admission Date: 2024  Current Admission Diagnosis:Medical clearance for psychiatric admission   Patient Active Problem List    Diagnosis Date Noted Date Diagnosed    Medical clearance for psychiatric admission 2024     Type 2 diabetes mellitus (HCC) 2024     Obesity 2024     Psychosis (HCC) 2024     Tobacco abuse 2024     Hyperlipidemia 2024     Reactive airway disease 2024     NICHOLAS (acute kidney injury) (HCC) 2024     SIRS (systemic inflammatory response syndrome) (HCC) 2024     Abnormal urinalysis 2024     Abdominal distension 2024       LOS (days): 1  Geometric Mean LOS (GMLOS) (days):   Days to GMLOS:     OBJECTIVE:    Risk of Unplanned Readmission Score: 12.74         Current admission status: Inpatient Psych  Referral Reason: Psych    Preferred Pharmacy:   UNKNOWN - FOLLOW UP PRIOR TO DISCHARGE TO E-PRESCRIBE  No address on file      Primary Care Provider: Adriana Bradford MD    Primary Insurance: MEDICARE  Secondary Insurance:     ASSESSMENT:  Active Health Care Proxies    There are no active Health Care Proxies on file.                 Readmission Root Cause  30 Day Readmission: No    Patient Information  Mental Status: Confused  Primary Caregiver: Self              Patient Information Continued  Income Source:  (unknown)  Current Status:: 302         Means of Transportation  Means of Transport to App::  (unknown)      Social Determinants of Health (SDOH)      Flowsheet Row Most Recent Value   Housing Stability    In the last 12 months, was there a time when you were not able to pay the mortgage or rent on time? Pt Unable   In the past 12 months, how many times have you moved where you were living? 0   At any time in the past 12 months, were you homeless or living in a shelter (including  now)? Pt Unable   Transportation Needs    In the past 12 months, has lack of transportation kept you from medical appointments or from getting medications? Pt Unable   In the past 12 months, has lack of transportation kept you from meetings, work, or from getting things needed for daily living? Pt Unable   Food Insecurity    Within the past 12 months, you worried that your food would run out before you got the money to buy more. Pt Unable   Within the past 12 months, the food you bought just didn't last and you didn't have money to get more. Pt Unable   Utilities    In the past 12 months has the electric, gas, oil, or water company threatened to shut off services in your home? Pt Unable          Biopsychosocial Assessment  Older Adult Behavioral Health Unit  Social Work Case Management Note  Anum James LMSW    Summary:   CM met with the patient to initiate assessment. Reviewed the admission and the role of CM. Meli was very anxious while speaking to her. She was moving backwards as this writer was speaking as if she was trying to get away. She appeared fearful. Unable to do an assessment, she did not communicate with this writer. Information pulled from chart review.     Per H&P, “59-year-old female with a past medical history of schizoaffective disorder.  Patient presented due to paranoia and fearfulness.  Patient displayed a flat affect, was disheveled with poor grooming, nonverbal, disorganized, responding to internal stimuli, internally preoccupied, paranoid, diaphoretic, and displaying bizarre movements including shuffling gait and rigidness in extremities.   Per chart review patient has history of taking clozapine.  It is unknown when patient last took clozapine but on arrival did have a clozapine level of 134 indicating that he had been taking recently but was not in the therapeutic range.  Due to the above-mentioned symptoms in addition vital signs that showed hypertension and tachycardia and lab work  "that showed leukocytosis, elevated CRP, and elevated CK there are concerns that the patient has developed atypical NMS due to rapid withdrawal of clozapine.  Though a rare occurrence, there have been case reports of patients developing an atypical NMS picture following a rapid removal of clozapine.  Based on patient's above-mentioned symptoms, vital signs, and lab work NMS cannot be ruled out at this time.”    Patient Name: Meli Nowak    Address: [address] [place] [address], [facility] [address], [place] [address]    Pharmacy: unknown    Insurance: Medicare A&B    /Age: 59 y.o. - 1965    Admission Date: 24    Diagnosis/D&A: Psychosis    County: [place]    Commitment: 302     Admitted from: [facility] ED    POA/Guardian: unknown    Living Situation: unknown - reports that she lives in an apartment with a cat, but not sure where    Access to firearms: unknown    Presenting Problem: Per [facility] ED crisis, “Patient arrives via [employer].     CIS met with patient to attempt to complete Crisis Intake and Safety Risk Assessment. Patient is not oriented to person, place, time or situation. Patient unable to identify where she lives, the city she is in, how or why she was brought to the hospital. Patient does not appear to be at baseline based on chart review. Patient is exhibiting rapid eye movement and appears to be responding to internal stimuli.    Patient continues to repeat that she is tired, and attempts to go to sleep. Patient is able to be awaken. Patient is unable to identify why she was brought to the hospital. Patient reports she was \"sitting on the bench at 3:30\", but does not continue the thought. Patient reports her cat is at home and needs fresh water. Patient reports having an ICM, but does not identify who this person is or where they work. Patient does deny suicidal ideation, homicidal ideation and auditory/visual/tactile hallucinations.”    “Patient remains paranoid, fearful, unable to provide information or decide what to " "do. Currently unable to care for herself or keep herself safe. She was unable to understand what was being said when CIS attempted to discuss inpatient mental health treatment with her. Her response was simply \"I'm so tired. I need to sleep.\" CIS attempted again a while later. Still the same response. Patient was unable to verbalize that she understood any option or information being discussed with her. Ultimately patient was involuntarily committed by a 2 physician 302 for her inability to care for or keep herself safe.”  Triggers for Hospitalization: unknown    Signs, symptoms, decompensation pattern: unknown    Previous psychiatric treatment: none listed in epic    Psychiatrist: unknown    Therapist: unknown    ACT/ICM/CPS/WRT/SC: notes report that she has an ICM, but she was not sure who it was.     PCP: GABRIELA Hutchison - [facility]    Psychiatric Medication history: unknown    Family mental health history: unknown    History of physical aggression/violence: unknown    History of self-harming behaviors, suicide attempts: unknown    Current family, children, significant relationships: unknown    Childhood/Adolescent history: unknown    Cultural/Spiritual/Worldview considerations: unknown    Legal Issues (past/present): unknown    : unknown  Education: unknown  Employment/Vocational (past/present): unknown  Transportation: unknown    Financial/Benefit Information/Rep-Payee: unknown    Medical history: see medical chart    Substance Use/Tobacco Use: unknown  UDS/Identified Substance(s) used: positive benzo's    Trauma/Abuse/Losses: unknown    Coping Skills: unknown    Strengths/Supports/Protective Factors: unknown    Barriers/Limitations in Recovery: unknown    Patient identified goals for treatment: unknown    CHANCE's obtained: none    Discharge Disposition: unknown currently    "

## 2024-07-04 ENCOUNTER — APPOINTMENT (INPATIENT)
Dept: RADIOLOGY | Facility: HOSPITAL | Age: 59
DRG: 682 | End: 2024-07-04
Payer: MEDICARE

## 2024-07-04 ENCOUNTER — APPOINTMENT (INPATIENT)
Dept: CT IMAGING | Facility: HOSPITAL | Age: 59
DRG: 682 | End: 2024-07-04
Payer: MEDICARE

## 2024-07-04 LAB
ALBUMIN SERPL BCG-MCNC: 4.3 G/DL (ref 3.5–5)
ALP SERPL-CCNC: 84 U/L (ref 34–104)
ALT SERPL W P-5'-P-CCNC: 18 U/L (ref 7–52)
ANION GAP SERPL CALCULATED.3IONS-SCNC: 12 MMOL/L (ref 4–13)
AST SERPL W P-5'-P-CCNC: 36 U/L (ref 13–39)
BASOPHILS # BLD AUTO: 0.06 THOUSANDS/ÂΜL (ref 0–0.1)
BASOPHILS NFR BLD AUTO: 1 % (ref 0–1)
BILIRUB SERPL-MCNC: 0.54 MG/DL (ref 0.2–1)
BUN SERPL-MCNC: 39 MG/DL (ref 5–25)
CALCIUM SERPL-MCNC: 9.5 MG/DL (ref 8.4–10.2)
CHLORIDE SERPL-SCNC: 106 MMOL/L (ref 96–108)
CK SERPL-CCNC: 749 U/L (ref 26–192)
CO2 SERPL-SCNC: 27 MMOL/L (ref 21–32)
CREAT SERPL-MCNC: 1.26 MG/DL (ref 0.6–1.3)
EOSINOPHIL # BLD AUTO: 0 THOUSAND/ÂΜL (ref 0–0.61)
EOSINOPHIL NFR BLD AUTO: 0 % (ref 0–6)
ERYTHROCYTE [DISTWIDTH] IN BLOOD BY AUTOMATED COUNT: 17.1 % (ref 11.6–15.1)
GFR SERPL CREATININE-BSD FRML MDRD: 47 ML/MIN/1.73SQ M
GLUCOSE SERPL-MCNC: 100 MG/DL (ref 65–140)
GLUCOSE SERPL-MCNC: 104 MG/DL (ref 65–140)
GLUCOSE SERPL-MCNC: 109 MG/DL (ref 65–140)
GLUCOSE SERPL-MCNC: 87 MG/DL (ref 65–140)
GLUCOSE SERPL-MCNC: 96 MG/DL (ref 65–140)
HCT VFR BLD AUTO: 37.3 % (ref 34.8–46.1)
HGB BLD-MCNC: 11.7 G/DL (ref 11.5–15.4)
IMM GRANULOCYTES # BLD AUTO: 0.03 THOUSAND/UL (ref 0–0.2)
IMM GRANULOCYTES NFR BLD AUTO: 0 % (ref 0–2)
LYMPHOCYTES # BLD AUTO: 2.01 THOUSANDS/ÂΜL (ref 0.6–4.47)
LYMPHOCYTES NFR BLD AUTO: 20 % (ref 14–44)
MAGNESIUM SERPL-MCNC: 2.4 MG/DL (ref 1.9–2.7)
MCH RBC QN AUTO: 28.7 PG (ref 26.8–34.3)
MCHC RBC AUTO-ENTMCNC: 31.4 G/DL (ref 31.4–37.4)
MCV RBC AUTO: 92 FL (ref 82–98)
MONOCYTES # BLD AUTO: 1.03 THOUSAND/ÂΜL (ref 0.17–1.22)
MONOCYTES NFR BLD AUTO: 11 % (ref 4–12)
NEUTROPHILS # BLD AUTO: 6.7 THOUSANDS/ÂΜL (ref 1.85–7.62)
NEUTS SEG NFR BLD AUTO: 68 % (ref 43–75)
NRBC BLD AUTO-RTO: 0 /100 WBCS
PHOSPHATE SERPL-MCNC: 5.2 MG/DL (ref 2.7–4.5)
PLATELET # BLD AUTO: 264 THOUSANDS/UL (ref 149–390)
PMV BLD AUTO: 9.3 FL (ref 8.9–12.7)
POTASSIUM SERPL-SCNC: 3.7 MMOL/L (ref 3.5–5.3)
PROCALCITONIN SERPL-MCNC: 0.21 NG/ML
PROT SERPL-MCNC: 7.3 G/DL (ref 6.4–8.4)
RBC # BLD AUTO: 4.07 MILLION/UL (ref 3.81–5.12)
SODIUM SERPL-SCNC: 145 MMOL/L (ref 135–147)
WBC # BLD AUTO: 9.83 THOUSAND/UL (ref 4.31–10.16)

## 2024-07-04 PROCEDURE — 84145 PROCALCITONIN (PCT): CPT | Performed by: NURSE PRACTITIONER

## 2024-07-04 PROCEDURE — 82550 ASSAY OF CK (CPK): CPT | Performed by: NURSE PRACTITIONER

## 2024-07-04 PROCEDURE — 70450 CT HEAD/BRAIN W/O DYE: CPT

## 2024-07-04 PROCEDURE — 85025 COMPLETE CBC W/AUTO DIFF WBC: CPT | Performed by: NURSE PRACTITIONER

## 2024-07-04 PROCEDURE — 84100 ASSAY OF PHOSPHORUS: CPT | Performed by: NURSE PRACTITIONER

## 2024-07-04 PROCEDURE — 80053 COMPREHEN METABOLIC PANEL: CPT | Performed by: NURSE PRACTITIONER

## 2024-07-04 PROCEDURE — 82948 REAGENT STRIP/BLOOD GLUCOSE: CPT

## 2024-07-04 PROCEDURE — 99223 1ST HOSP IP/OBS HIGH 75: CPT | Performed by: STUDENT IN AN ORGANIZED HEALTH CARE EDUCATION/TRAINING PROGRAM

## 2024-07-04 PROCEDURE — 99232 SBSQ HOSP IP/OBS MODERATE 35: CPT | Performed by: INTERNAL MEDICINE

## 2024-07-04 PROCEDURE — 71045 X-RAY EXAM CHEST 1 VIEW: CPT

## 2024-07-04 PROCEDURE — 83735 ASSAY OF MAGNESIUM: CPT | Performed by: NURSE PRACTITIONER

## 2024-07-04 RX ORDER — ACETAMINOPHEN 10 MG/ML
1000 INJECTION, SOLUTION INTRAVENOUS EVERY 6 HOURS PRN
Status: DISCONTINUED | OUTPATIENT
Start: 2024-07-04 | End: 2024-07-05 | Stop reason: HOSPADM

## 2024-07-04 RX ORDER — LORAZEPAM 2 MG/ML
2 INJECTION INTRAMUSCULAR EVERY 4 HOURS
Status: DISCONTINUED | OUTPATIENT
Start: 2024-07-04 | End: 2024-07-05 | Stop reason: HOSPADM

## 2024-07-04 RX ORDER — LORAZEPAM 2 MG/ML
2 INJECTION INTRAMUSCULAR ONCE
Status: COMPLETED | OUTPATIENT
Start: 2024-07-04 | End: 2024-07-04

## 2024-07-04 RX ORDER — LORAZEPAM 2 MG/ML
2 INJECTION INTRAMUSCULAR EVERY 4 HOURS
Status: DISCONTINUED | OUTPATIENT
Start: 2024-07-04 | End: 2024-07-04

## 2024-07-04 RX ORDER — HYDRALAZINE HYDROCHLORIDE 20 MG/ML
10 INJECTION INTRAMUSCULAR; INTRAVENOUS EVERY 6 HOURS PRN
Status: DISCONTINUED | OUTPATIENT
Start: 2024-07-04 | End: 2024-07-05

## 2024-07-04 RX ADMIN — ASPIRIN 81 MG CHEWABLE TABLET 81 MG: 81 TABLET CHEWABLE at 10:19

## 2024-07-04 RX ADMIN — HEPARIN SODIUM 5000 UNITS: 5000 INJECTION INTRAVENOUS; SUBCUTANEOUS at 15:25

## 2024-07-04 RX ADMIN — LORAZEPAM 0.5 MG: 0.5 TABLET ORAL at 10:09

## 2024-07-04 RX ADMIN — HEPARIN SODIUM 5000 UNITS: 5000 INJECTION INTRAVENOUS; SUBCUTANEOUS at 21:33

## 2024-07-04 RX ADMIN — SODIUM CHLORIDE, SODIUM GLUCONATE, SODIUM ACETATE, POTASSIUM CHLORIDE, MAGNESIUM CHLORIDE, SODIUM PHOSPHATE, DIBASIC, AND POTASSIUM PHOSPHATE 125 ML/HR: .53; .5; .37; .037; .03; .012; .00082 INJECTION, SOLUTION INTRAVENOUS at 04:29

## 2024-07-04 RX ADMIN — ACETAMINOPHEN 1000 MG: 10 INJECTION INTRAVENOUS at 21:57

## 2024-07-04 RX ADMIN — LORAZEPAM 2 MG: 2 INJECTION INTRAMUSCULAR; INTRAVENOUS at 23:12

## 2024-07-04 RX ADMIN — SODIUM CHLORIDE, SODIUM GLUCONATE, SODIUM ACETATE, POTASSIUM CHLORIDE, MAGNESIUM CHLORIDE, SODIUM PHOSPHATE, DIBASIC, AND POTASSIUM PHOSPHATE 100 ML/HR: .53; .5; .37; .037; .03; .012; .00082 INJECTION, SOLUTION INTRAVENOUS at 20:18

## 2024-07-04 RX ADMIN — CEFTRIAXONE 1000 MG: 1 INJECTION, SOLUTION INTRAVENOUS at 17:30

## 2024-07-04 RX ADMIN — LORAZEPAM 2 MG: 2 INJECTION INTRAMUSCULAR; INTRAVENOUS at 10:31

## 2024-07-04 RX ADMIN — LORAZEPAM 2 MG: 2 INJECTION INTRAMUSCULAR; INTRAVENOUS at 20:12

## 2024-07-04 RX ADMIN — HEPARIN SODIUM 5000 UNITS: 5000 INJECTION INTRAVENOUS; SUBCUTANEOUS at 06:05

## 2024-07-04 RX ADMIN — LORAZEPAM 2 MG: 2 INJECTION INTRAMUSCULAR; INTRAVENOUS at 15:25

## 2024-07-04 RX ADMIN — HYDRALAZINE HYDROCHLORIDE 10 MG: 20 INJECTION, SOLUTION INTRAMUSCULAR; INTRAVENOUS at 12:49

## 2024-07-04 NOTE — ASSESSMENT & PLAN NOTE
Lab Results   Component Value Date    HGBA1C 6.4 (H) 05/03/2024       Recent Labs     07/03/24  1211 07/03/24  1804 07/03/24 2045 07/04/24  0542   POCGLU 134 113 124 104         Blood Sugar Average: Last 72 hrs:  (P) 113.3036038992471715V6i 6.4  Hold home Metformin  Monitor ACCU checks AC + HS with lispro insulin sliding scale coverage

## 2024-07-04 NOTE — PROGRESS NOTES
[facility]  Progress Note  Name: Meli Nowak I  MRN: [id number]  Unit/Bed#: 7T [facility] 717-01 I Date of Admission: 7/3/2024   Date of Service: 7/4/2024 I Hospital Day: 1    Assessment & Plan   Abdominal distension  Assessment & Plan  Abdominal distention on exam, no signs of pain or discomfort with palpation, suspecting hernia   CT A/P unremarkable   Will hold off on starting flagyl at this time   Bowel regimen   I+O, PVRs    Rhabdomyolysis  Assessment & Plan   likely due to NICHOLAS  Given 1 L of isolate  Continue IV fluids and repeat CK in the morning  Continues to rise to 749 on 7/4; continue IV fluids  Repeat morning labs    Abnormal urinalysis  Assessment & Plan  Urinalysis abnormal with mucus threads and WBCs  Follow-up urine culture  Okay to straight cath if needed for sample  Empirically start IV Rocephin    SIRS (systemic inflammatory response syndrome) (HCC)  Assessment & Plan  POA, as evidenced by tachycardia and leukocytosis  No source of infection but suspicious for UTI   CT C/A/P unremarkable   Empirically start IV Rocephin  Follow-up up blood and urine cultures, Pro-Yves and lactic acidosis  Trend fever curve and CBC with differential  7/4-update: White count trending down; procalcitonin essentially negative X2.  Will continue antibiotics, day 2/3.  Low threshold to continue antibiotics through third day pending neurological consult and clinical worsening.    Reactive airway disease  Assessment & Plan  Stable, no signs of exacerbation  Continue home Arnuity and Albuterol PRN     Tobacco abuse  Assessment & Plan  Patient unable to state whether she continues to smoke  Consider nicotine patch if needed    Psychosis (HCC)  Assessment & Plan  Initially admitted to [place] inpatient behavioral health unit.  There was concern for atypical NMS given diaphoresis, tachycardia, episode of hypertension, and mild rigidity.  Her blood pressure and rigidity has improved.  Her CK is  mildly elevated likely from rhabdomyolysis.  Currently, she is not meeting NMS criteria per psychiatry  Continue one-to-one  Appreciate psychiatry input  Can return to behavioral health unit once medically stable    Obesity  Assessment & Plan  Would benefit from weight loss and therapeutic lifestyle changes  Education and counseling as tolerated   Consider nutrition evaluation       Type 2 diabetes mellitus (HCC)  Assessment & Plan  Lab Results   Component Value Date    HGBA1C 6.4 (H) 05/03/2024       Recent Labs     07/03/24  1211 07/03/24  1804 07/03/24  2045 07/04/24  0542   POCGLU 134 113 124 104         Blood Sugar Average: Last 72 hrs:  (P) [id number] 6.4  Hold home Metformin  Monitor ACCU checks AC + HS with lispro insulin sliding scale coverage     * NICHOLAS (acute kidney injury) ([facility])  Assessment & Plan  POA, as evidenced by creatinine 1.8 with a baseline near 0.9, likely prerenal due to dehydration  Given 1 L of Isolyte followed by maintenance fluids  Cr today: 1.26  Continue gentle IVF given poor po intake   Limit nephrotoxic agents and avoid hypotension  Monitor I&O and PVRs  Creatinine and CK in the morning  7/4-creatinine improved and currently 1.26; NICHOLAS resolved.  Will continue gentle IV hydration and monitor CK on morning labs.               VTE Pharmacologic Prophylaxis:   High Risk (Score >/= 5) - Pharmacological DVT Prophylaxis Ordered: heparin. Sequential Compression Devices Ordered.    Mobility:   Basic Mobility Inpatient Raw Score: 20  JH-HLM Goal: 6: Walk 10 steps or more  JH-HLM Achieved: 3: Sit at edge of bed  JH-HLM Goal NOT achieved. Continue with multidisciplinary rounding and encourage appropriate mobility to improve upon JH-HLM goals.    Patient Centered Rounds: I performed bedside rounds with nursing staff today.   Discussions with Specialists or Other Care Team Provider: Psychiatry, neurology    Education and Discussions with Family / Patient: Discussed treatment plan with  family and patient who agree with current plan; encouraged to ask questions and participate.      Total Time Spent on Date of Encounter in care of patient: 45 mins. This time was spent on one or more of the following: performing physical exam; counseling and coordination of care; obtaining or reviewing history; documenting in the medical record; reviewing/ordering tests, medications or procedures; communicating with other healthcare professionals and discussing with patient's family/caregivers.    Current Length of Stay: 1 day(s)  Current Patient Status: Inpatient   Certification Statement: The patient will continue to require additional inpatient hospital stay due to treatment of altered mental status  Discharge Plan: Anticipate discharge in >72 hrs to inpatient psych.    Code Status: Level 1 - Full Code    Subjective:   Patient seen and examined at bedside, still appears obtunded with disorganized thoughts, tremors, rigidity.  Now with new left-sided conjunctival injection; continues to be hypertensive.  IV access lost earlier, but now regained and will give hydralazine for blood pressure control.  Repeat CT head this morning unchanged and shows no acute intracranial deficits/pathology.  Appreciate psychiatry recommendations, have started on every 4 Ativan for treatment of NMS.  Have consulted neurology and sent message via secure chat.  Await further recommendations.  Will continue antibiotics for possible UTI; white count trending down today and procalcitonin negative.  Low threshold to discontinue antibiotics after third dose tomorrow pending further workup.  Some concern for CNS infection, but no nuchal rigidity or photophobia appreciated.  Will consider LP moving forward; again, will discuss with neurology and scheduled once IR available.    NICHOLAS has resolved with IV fluids, patient made n.p.o. and placed on stepdown level 2.  Will continue IV hydration overnight and obtain morning labs.  Monitor for  improvement.  Given patient has inability to protect IV access, have requested midline versus PICC be placed with IR tomorrow.  Low threshold to obtain MRI brain; but at this time patient noncompliant and trouble to us, likely poor study if ordered at this time.    Objective:     Vitals:   Temp (24hrs), Av.6 °F (37 °C), Min:98.2 °F (36.8 °C), Max:99 °F (37.2 °C)    Temp:  [98.2 °F (36.8 °C)-99 °F (37.2 °C)] 99 °F (37.2 °C)  HR:  [] 94  Resp:  [19-20] 19  BP: (171-175)/(82-93) 175/93  SpO2:  [91 %] 91 %  Body mass index is 37.28 kg/m².     Input and Output Summary (last 24 hours):     Intake/Output Summary (Last 24 hours) at 2024 1358  Last data filed at 2024 1309  Gross per 24 hour   Intake 1861.25 ml   Output 300 ml   Net 1561.25 ml       Physical Exam:   Physical Exam  Vitals and nursing note reviewed.   Constitutional:       General: She is not in acute distress.     Appearance: She is obese. She is ill-appearing and diaphoretic. She is not toxic-appearing.   HENT:      Head: Normocephalic.      Mouth/Throat:      Mouth: Mucous membranes are dry.   Eyes:      Conjunctiva/sclera: Conjunctivae normal.      Comments: Conjunctival injection both eyes   Cardiovascular:      Rate and Rhythm: Tachycardia present.   Pulmonary:      Effort: Pulmonary effort is normal.   Abdominal:      General: Bowel sounds are normal. There is distension.      Palpations: Abdomen is soft.      Tenderness: There is no abdominal tenderness. There is no guarding or rebound.   Musculoskeletal:         General: Normal range of motion.      Cervical back: Normal range of motion.      Right lower leg: No edema.      Left lower leg: No edema.   Skin:     General: Skin is warm.   Neurological:      Mental Status: She is alert. She is disoriented.      Comments: AAO X0, CAM-ICU positive   Psychiatric:         Mood and Affect: Affect is flat.         Behavior: Behavior is withdrawn. Behavior is cooperative.         Cognition and  Memory: Memory is impaired.      Comments: Obtunded            Additional Data:     Labs:  Results from last 7 days   Lab Units 07/04/24  0529   WBC Thousand/uL 9.83   HEMOGLOBIN g/dL 11.7   HEMATOCRIT % 37.3   PLATELETS Thousands/uL 264   SEGS PCT % 68   LYMPHO PCT % 20   MONO PCT % 11   EOS PCT % 0     Results from last 7 days   Lab Units 07/04/24  0529   SODIUM mmol/L 145   POTASSIUM mmol/L 3.7   CHLORIDE mmol/L 106   CO2 mmol/L 27   BUN mg/dL 39*   CREATININE mg/dL 1.26   ANION GAP mmol/L 12   CALCIUM mg/dL 9.5   ALBUMIN g/dL 4.3   TOTAL BILIRUBIN mg/dL 0.54   ALK PHOS U/L 84   ALT U/L 18   AST U/L 36   GLUCOSE RANDOM mg/dL 96     Results from last 7 days   Lab Units 07/01/24  2049   INR  1.04     Results from last 7 days   Lab Units 07/04/24  1135 07/04/24  0542 07/03/24  2045 07/03/24  1804 07/03/24  1211 07/03/24  0555 07/03/24  0301 07/02/24  2021 07/02/24  0732   POC GLUCOSE mg/dl 109 104 124 113 134 135 104 88 116         Results from last 7 days   Lab Units 07/04/24  0529 07/03/24  1715   LACTIC ACID mmol/L  --  0.8   PROCALCITONIN ng/ml 0.21 0.26*       Lines/Drains:  Invasive Devices       Peripheral Intravenous Line  Duration             Peripheral IV 07/04/24 Left Antecubital <1 day                          Imaging: Reviewed radiology reports from this admission including: CT head    Recent Cultures (last 7 days):   Results from last 7 days   Lab Units 07/03/24  1330   BLOOD CULTURE  Received in Microbiology Lab. Culture in Progress.  Received in Microbiology Lab. Culture in Progress.       Last 24 Hours Medication List:   Current Facility-Administered Medications   Medication Dose Route Frequency Provider Last Rate    acetaminophen  650 mg Oral Q4H PRN JOSE ELIAS Ortega      acetaminophen  650 mg Oral Q4H PRN JOSE ELIAS Ortega      acetaminophen  975 mg Oral Q6H PRN JOSE ELIAS Ortega      albuterol  2 puff Inhalation Q4H PRN JOSE ELIAS Ortega       aluminum-magnesium hydroxide-simethicone  30 mL Oral Q4H PRN Kristen Archuletamaynor, CRNP      aspirin  81 mg Oral Daily Kristen Logan, JOSE ELIAS      cefTRIAXone  1,000 mg Intravenous Q24H Kristen Padillajosias, JOSE ELIAS Stopped (07/03/24 7344)    cyanocobalamin  1,000 mcg Oral Daily Kristen Arcuhletamaynor, CRVALE      fluticasone  1 puff Inhalation Daily Kristen PadillaJOSE ELIAS ferrara      heparin (porcine)  5,000 Units Subcutaneous Q8H JONATHAN Kristen Padillajosias, CRNP      hydrALAZINE  10 mg Intravenous Q6H PRN Murray Pandey MD      hydrOXYzine HCL  25 mg Oral Q6H PRN Max 4/day Kristen Logan, CRVALE      hydrOXYzine HCL  50 mg Oral Q6H PRN Max 4/day Kristen Padillajosias, JOSE ELIAS      insulin lispro  1-5 Units Subcutaneous TID AC Kristen Archuletamaynor, CRNP      insulin lispro  1-5 Units Subcutaneous HS Kristen Ludwig Doreen, JOSE ELIAS      LORazepam  2 mg Intravenous Q4H Quincy Garcia MD      Or    LORazepam  2 mg Intramuscular Q4H Quincy Garcia MD      melatonin  3 mg Oral HS Kristen Archuletamaynor, JOSE ELIAS      multi-electrolyte  100 mL/hr Intravenous Continuous Murray Pandey  mL/hr (07/04/24 0605)    nicotine polacrilex  4 mg Oral Q2H PRN Kristen Archuletamaynor, CRVALE      ondansetron  4 mg Oral Q8H PRN Kristen Padillajosias, CRNP      polyethylene glycol  17 g Oral Daily PRN Kristen Padillajosias, CRNP      polyethylene glycol  17 g Oral Daily Kristen Padillajosias, CRNP      senna  2 tablet Oral HS Kristen Archuletamaynor, JOSE ELIAS      senna-docusate sodium  1 tablet Oral Daily PRN Kristen Padillajosias, CRVALE      traZODone  50 mg Oral HS PRN Kristen Ludwig Doreen, JOSE ELIAS          Today, Patient Was Seen By: Murray Pandey MD    **Please Note: This note may have been constructed using a voice recognition system.**

## 2024-07-04 NOTE — ASSESSMENT & PLAN NOTE
POA, as evidenced by tachycardia and leukocytosis  No source of infection but suspicious for UTI   CT C/A/P unremarkable   Empirically start IV Rocephin  Follow-up up blood and urine cultures, Pro-Yves and lactic acidosis  Trend fever curve and CBC with differential  7/4-update: White count trending down; procalcitonin essentially negative X2.  Will continue antibiotics, day 2/3.  Low threshold to continue antibiotics through third day pending neurological consult and clinical worsening.

## 2024-07-04 NOTE — ASSESSMENT & PLAN NOTE
POA, as evidenced by creatinine 1.8 with a baseline near 0.9, likely prerenal due to dehydration  Given 1 L of Isolyte followed by maintenance fluids  Cr today: 1.26  Continue gentle IVF given poor po intake   Limit nephrotoxic agents and avoid hypotension  Monitor I&O and PVRs  Creatinine and CK in the morning  7/4-creatinine improved and currently 1.26; NICHOLAS resolved.  Will continue gentle IV hydration and monitor CK on morning labs.

## 2024-07-04 NOTE — TREATMENT PLAN
TREATMENT PLAN REVIEW - Behavioral Health Meli Nowak 59 y.o. 1965 female MRN: [id number]    [facility] 7T PCU TELE Room / Bed: [facility] 644/[facility] 644-01 Encounter: [id number]          Admit Date/Time:  7/2/2024  7:43 PM    Treatment Team:   GUILLERMO Weiss CRNP Stephanie McNally, DEIDRE Schaeffer    Diagnosis: Active Problems:    Medical clearance for psychiatric admission    Type 2 diabetes mellitus (HCC)    Obesity    Psychosis (HCC)    Tobacco abuse    Hyperlipidemia    Reactive airway disease    NICHOLAS (acute kidney injury) (HCC)    SIRS (systemic inflammatory response syndrome) ([facility])    Abnormal urinalysis    Rhabdomyolysis      Patient Strengths/Assets: average or above intelligence, capable of independent living, good past treatment response    Patient Barriers/Limitations: impaired cognition, noncompliant with medication, noncompliant with treatment, patient is on an involuntary commitment, patient is unwilling to work on problems, poor insight    Short Term Goals: decrease in paranoid thoughts, decrease in psychotic symptoms, mood stabilization, increase in group attendance, increase in socialization with peers on the unit, acceptance of need for psychiatric treatment, acceptance of psychiatric medications    Long Term Goals: stabilization of mood, acceptance of need for psychiatric medications, acceptance of need for psychiatric treatment, acceptance of need for psychiatric follow up after discharge, acceptance of psychiatric diagnosis, appropriate interaction with peers, appropriate interaction with family, stable living arrangements upon discharge, establishment of family support upon discharge    Progress Towards Goals: starting psychiatric medications as prescribed, continue psychiatric medications as prescribed    Recommended Treatment: medication management, patient  medication education, group therapy, milieu therapy, continued Behavioral Health psychiatric evaluation/assessment process    Treatment Frequency: daily medication monitoring, group and milieu therapy daily, monitoring through interdisciplinary rounds, monitoring through weekly patient care conferences    Expected Discharge Date:  TBD    Discharge Plan: referrals as indicated, return to previous living arrangement    Treatment Plan Created/Updated By: Jordan Christopher Holter, DO

## 2024-07-04 NOTE — CMS CERTIFICATION NOTE
Certification: Based upon physical, mental and social evaluations, I certify that inpatient psychiatric services continue to be medically necessary for this patient for a duration of greater than 2 midnights for the treatment of  <principal problem not specified> Available alternative community resources still do not meet the patient's mental health care needs. I further attest that an established written individualized plan of care has been updated and is outlined in the patient's medical records.    Jordan Christopher Holter, DO

## 2024-07-04 NOTE — CONSULTS
"Psychiatry Consultation Note   Meli Nowak 59 y.o. female MRN: [id number]  Unit/Bed#: 7T [facility] 717-01 Encounter: [id number]      Assessment and Plan     Assessment       Assessment:    Meli Nowak is a 59 y.o. female,  with past psychiatric history of schizoaffective disorder, and agoraphobia per chart review, and past medical history of type 2 diabetes as per chart history was transferred from inpatient behavioral health to the medical floor due to an acute change in mental status, unstable vitals, diaphoresis, and rigidity.  Based upon clinical picture there is a concern for potential NMS pending further infectious/neurologic/metabolic workup.  Although patient is afebrile, several cases have been described, of an \"atypical NMS\", in which either the classical symptoms of rigidity or fever are not present.  Because this patient does not have a significant chart history demonstrating baseline vital signs, it is technically impossible to evaluate patient using the established [place] criteria for NMS diagnosis, as the criteria defines blood pressure elevation and tachycardia  by 25% above patient's baseline.  Regardless, due to patient's autonomic dysfunction in the form of significant hypertension and diaphoresis, elevated CK, tachycardia, rigidity, altered mental status, elevated CK, and exposure to dopamine antagonists, NMS  should be considered and treated as it is a life-threatening condition.  Patient was exposed to several different antipsychotics in the past 48 hours, while she still had a serum clozapine level and further, NMS can also be precipitated by abrupt discontinuation of a large dose of antipsychotic.  Also on the differential: malignant catatonia, rebound cholinergic syndrome from the potential abrupt discontinuation of clozapine prior to hospitalization, or other metabolic/infectious/neurologic etiology.  There is a concern for possible urosepsis and patient's urine culture and blood cultures " are currently pending.  Recommend reestablishing IV access and continuing supportive care with IV fluid resuscitation, management of hypertension, infectious workup, and neurologic consult.  Recommend Ativan 2mg IV every 4 hours and if no IV access, then utilize the IM route, hold all other psychotropics.  Recommend continuing to follow the CK level with morning labs.      who was originally admitted to [address] on an involuntary commitment status on 7/3/2024 due to disorganized behavior and psychotic symptoms.  Patient was then transferred to the medical floor later that day due after an acute mental status change, unstable vitals, rigidity, and NICHOLAS.  Psychiatric consultation was requested to evaluate due to workup and treatment of potential NMS. See below for the initial behavioral health H&P prior to patient transfer.       Principal Problem:    NICHOLAS (acute kidney injury) (HCC)  Active Problems:    Type 2 diabetes mellitus (HCC)    Psychosis (HCC)    Tobacco abuse    Reactive airway disease    SIRS (systemic inflammatory response syndrome) (HCC)    Abnormal urinalysis    Rhabdomyolysis    Abdominal distension      Plan     Plan:   Discussed with primary team, with the following recommendations:    Treatment Recommendations:  Patient is currently a 302 commitment status, with 303 hearing on 7/5/2024, patient does meet the criteria for inpatient psychiatric hospitalization and is to be readmitted to inpatient behavioral health once medically stable  Continue 1:1 observation for safety  Medical management as per primary team, recommend achieving IV access for continued fluid resuscitation, continue following infectious workup, and neurology consult  Continue to follow the CK level with morning labs  Pharmacological:   Recommend the following medication changes:   Discontinue p.o. Ativan as patient is no longer accepting p.o.  1 dose of Ativan IM 4 mg given while patient does not have IV access  Start scheduled  "Ativan IV 4 mg every 4 hours or Ativan IM 4 mg every 4 hours if IV access is not available.  Continue to hold all other psychotropics  Observation level: Physical 1:1 for patient's safety  Referrals: Recommend neurology consult  Psychiatry will continue to follow as needed. Please contact our service via TigerText with any additional questions or concerns. If contacting after hours, please call or TigerText the on-call team (AMWELL: [phone]) with any questions or concerns.    Risks, benefits and possible side effects of Medications: Patient currently has altered mental status, and is unable to understand the potential risks, benefits and possible side effects of medications    HPI   History of Present Illness   Physician Requesting Consult: Murray Pandey MD  Reason for Consult / Principal Problem: NMS rule out    Chief Complaint: Patient nonverbal, unable to provide chief complaint    Meli Nowak is a 59 y.o. female,  with past psychiatric history of schizoaffective disorder, and agoraphobia per chart review, and past medical history of type 2 diabetes as per chart history who was originally admitted to [address] on an involuntary commitment status on 7/3/2024 due to disorganized behavior and psychotic symptoms.  Patient was then transferred to the medical floor later that day due after an acute mental status change, unstable vitals, rigidity, and NICHOLAS.  Psychiatric consultation was requested to evaluate due to workup and treatment of potential NMS. See below for the initial behavioral health H&P prior to patient transfer.    Inpatient U H&P conducted by Dr. Shan Fitzgerald, DO:  \"Meli Nowak is a 59 y.o.  female, with an unknown living situation, with a PPHx of agoraphobia, schizoaffective disorder per chart review, and PMHx of type 2 diabetes who presented to [facility] due to paranoia. Patient was admitted to the Behavioral Health Unit on a involuntary 303 commitment basis due to signs of acute " "psychosis, psychotic symptoms, paranoid ideation, and odd behavior.     Symptoms prior to admission included disorganized behavior, disorganized thinking process, poor self-care, and change in mental status. Onset of symptoms was abrupt starting a few days ago with rapidly worsening course since that time.      On initial evaluation after admission to the inpatient psychiatric unit, Meli displayed a flat affect, was disheveled with poor grooming, nonverbal, disorganized, responding to internal stimuli, internally preoccupied, paranoid, diaphoretic, and displaying bizarre movements including shuffling gait and rigidness in extremities.  Patient would not respond to any questioning.  She was difficult to redirect and appeared to be paranoid of this note writer.  Patient was walking without purpose and reaching for things on the wall.  She required multiple attempts at redirection to sit in his seat which patient only did briefly before getting up and beginning to walk away  without answering questions.  Per chart review patient was found by PD sitting on a park bench appearing \"very paranoid and making no sense.\"  Patient in emergency department was also not answering questions and just kept repeatedly saying \"I am scared.\"  Per chart review patient had been prescribed clozapine and it is unknown if she had been taking it.  Clozapine level on admission was 134.      After attempting to interview patient case was discussed with medical team.  Patient displayed concerns for catatonia versus NMS versus cholinergic rebound syndrome.  Further lab work demonstrated patient met SIRS criteria due to leukocytosis and tachycardia.  Patient was afebrile but also was hypertensive.  CRP and CK were elevated.  Patient also had an NICHOLAS.  Based on patient meeting SIRS criteria, having a new NICHOLAS, and having concerns for possible NMS versus catatonia versus cholinergic rebound it was determined that patient should be transferred to " "medical floor.\"      Patient originally presented to the ED due to psychotic symptoms and erratic behavior on 7/01.  As per chart review, patient is prescribed clozapine 200 mg qAM and 300 mg qPM, however patient's Clozaril level upon ED presentation was 134 which is subtherapeutic.  Previous record Clozapine level on 5/03/2024 was 522.  On the day of presentation, patient was given Haldol 5 mg IM PRN and Ativan 2 mg IM  due to erratic behavior.  The following day [facility] psychiatry was consulted on during which patient was nonverbal and avoiding eye contact.  After consultation, patient was placed olanzapine 5 mg PO daily, and PRNs were changed from Haldol to olanzapine. That day, patient received both a Haldol 5 mg IM as needed and olanzapine 5 mg IM as needed.  After behavioral health transfer, patient received  her scheduled olanzapine 5 mg PO, and additional Zyprexa IM PRN, and Ativan 1 mg IM PRN.  On initial admission interview, patient was babbling, rigid in extremities, and diaphoretic.  Patient was hypertensive at 167/95, tachycardic at 110, CBC revealed a leukocytosis of 14.9, CK of 518, an NICHOLAS of 1.88 (baseline 0.8-0.99), and a ProCal slightly elevated at 0.26.  Patient's was not hypoglycemic.  Patient's Zyprexa was discontinued, and patient was started on Ativan 0.5 mg p.o. 4 times daily or IV if patient was not accepting p.o.  Patient was given a 1 L isolate bolus and transferred to the medical floor.  On the medical floor, patient did meet sirs criteria, and a UA concerning for possible UTI.  Patient was started on empiric ceftriaxone, and blood/urine cultures are pending.     On today's evaluation, patient had lost IV access and there was difficulty in reestablishing, so she was no longer receiving fluids.  Patient was not taking any p.o. fluid, food, or medication.  Patient was nonverbal, making noises.  She was significantly diaphoretic, her face and body soaked with sweat.  Her mucous " membranes appeared significantly dry. She had abnormal upper extremity flexed posturing, and upper and lower tremulousness.  Patient with cogwheel rigidity in both upper and lower extremities. She was unable to follow commands. Today, patient's CK was elevated to 749. She was hypertensive at 212/114 and Tachycardic into the 100s.  Because patient lost IV access and was not accepting p.o., had only received 2 doses of 0.5 mg Ativan yesterday.  Any psychiatric review of systems,additional history gathering, or safety assessment from patient was impossible secondary to her mental status and being nonverbal.    Psychiatric Review Of Systems:  Unable to complete safety assessment or psychiatric review of systems as patient is nonverbal with altered mental status    Historical Information     Past Psychiatric History:   Past Inpatient Psychiatric Treatment:   Unable to obtain due to patient factors  Past Outpatient Psychiatric Treatment:    Unable to obtain due to patient factors  Past Suicide Attempts: Unable to obtain due to patient factors  Past Violent Behavior: Unable to obtain due to patient factors  Past Psychiatric Medication Trials: Clozaril, Ativan, Haldol    Substance Abuse History:  Social History       Tobacco History       Smoking Status  Every Day Current Packs/Day  0.5 packs/day Smoking Tobacco Type  Cigarettes   Pack Year History     Packs/Day From To Years    0.5   0.0      Smokeless Tobacco Use  Never              Alcohol History       Alcohol Use Status  Never Comment  Unable to determine              Drug Use       Drug Use Status  No              Sexual Activity       Sexually Active  Not Asked              Activities of Daily Living    Not Asked                 Alcohol use was unable to be obtained due to patient factors    Family Psychiatric History:   Unable to be obtained due to the patient factors    Social History:  Unable to be obtained due to patient factors    Traumatic History:   Unable to  be obtained due to patient factors    Past Medical History:  History of Seizures: Unable to obtain due to patient factors, none on chart review  History of Head injury with loss of consciousness: Unable to be obtained due to patient factors, none on chart review    Past Medical History:   Diagnosis Date    Cognitive impairment     Diabetes mellitus (HCC)     Psychosis (HCC)      Past Surgical History:   Procedure Laterality Date     SECTION      CHOLECYSTECTOMY         Mental Status Evaluation and Medical ROS     Medical Review Of Systems:  Pertinent items are noted in HPI.    Meds/Allergies   All current active medications have been reviewed.  No Known Allergies    Objective   Vital signs in last 24 hours:  Temp:  [98.2 °F (36.8 °C)-99 °F (37.2 °C)] 99 °F (37.2 °C)  HR:  [] 94  Resp:  [19-20] 19  BP: (171-175)/(82-93) 175/93      Intake/Output Summary (Last 24 hours) at 2024 1057  Last data filed at 2024 0648  Gross per 24 hour   Intake 1861.25 ml   Output 300 ml   Net 1561.25 ml       Mental Status Evaluation:  Appearance:  In hospital gown, marginal grooming, diaphoretic, abnormal posturing   Behavior:  Nonverbal, encephalopathic   Speech:  Nonverbal, babbling   Mood:  Unable to assess due to patient factors   Affect:  Unable to be assessed due to patient factors   Language: Nonverbal   Thought Process:  Unable to be assessed due to patient factors   Associations: Unableto be assessed due to patient factors   Thought Content:  Unable to be assessed due to patient factors   Perceptual Disturbances: Unable to assess due to patient factors   Risk Potential: Suicidal ideation - unable to assess  Homicidal ideation -  unable to assess  Potential for aggression -  unable to assess   Sensorium:  non-verbal   Memory:  patient is non-verbal   Consciousness:  awake and altered mental status   Attention/Concentration: attention span and concentration: unable to assess due to lack of cooperation    Intellect: unable to assess   Fund of Knowledge: Unable to assess   Insight:  Unable to assess   Judgment: Unable to assess    Muscle Tone: Cogwheel rigidity in upper and lower extremities     Gait/Station: Unable to be assessed patient in bed   Motor Activity: abnormal movement noted: Coarse upper and lower motor activity     Laboratory Results: I have personally reviewed all pertinent laboratory/tests results    Labs in last 72 hours:   Recent Labs     07/01/24  2049 07/03/24  1155 07/03/24  1329 07/03/24  1715 07/04/24  0529   WBC 9.82   < >  --    < > 9.83   RBC 4.83   < >  --    < > 4.07   HGB 13.8   < >  --    < > 11.7   HCT 43.2   < >  --    < > 37.3      < >  --    < > 264   RDW 16.1*   < >  --    < > 17.1*   NEUTROABS 6.71   < >  --    < > 6.70   SODIUM 142   < >  --   --  145   K 4.3   < >  --   --  3.7      < >  --   --  106   CO2 29   < >  --   --  27   BUN 11   < >  --   --  39*   CREATININE 0.99   < >  --   --  1.26   GLUC 93   < >  --   --  96   CALCIUM 9.9   < >  --   --  9.5   AST 19   < >  --   --  36   ALT 15   < >  --   --  18   ALKPHOS 110*   < >  --   --  84   TP 7.4   < >  --   --  7.3   ALB 4.7   < >  --   --  4.3   TBILI 0.49   < >  --   --  0.54   AMMONIA  --   --  32  --   --    SUF5UAMKGEHL 0.952  --   --   --   --     < > = values in this interval not displayed.       Imaging Studies: XR chest portable    Result Date: 7/4/2024  Narrative: XR CHEST PORTABLE INDICATION: delirium. COMPARISON: CXR 7/30/2019, chest CT 7/3/2024. FINDINGS: Clear lungs. No pneumothorax or pleural effusion. Normal cardiomediastinal silhouette. Mild curvature of the spine. Upper abdomen normal. Cholecystectomy. Redemonstration of elevation of the right diaphragm.     Impression: No acute cardiopulmonary disease. Workstation performed: [id number]     CT chest abdomen pelvis wo contrast    Result Date: 7/3/2024  Narrative: CT CHEST, ABDOMEN AND PELVIS WITHOUT IV CONTRAST INDICATION: Abdominal  distension, AMS. COMPARISON: None. TECHNIQUE: CT examination of the chest, abdomen and pelvis was performed without intravenous contrast. Multiplanar 2D reformatted images were created from the source data. This examination, like all CT scans performed in the [facility], was performed utilizing techniques to minimize radiation dose exposure, including the use of iterative reconstruction and automated exposure control. Radiation dose length product (DLP) for this visit: 1234 mGy-cm Enteric Contrast: Not administered. FINDINGS: Absence of intravenous contrast limits evaluation of the abdominal and pelvic viscera. Evaluation is further limited due to patient positioning. CHEST LUNGS: Lungs are clear. No tracheal or endobronchial lesion. Mild coronary artery calcifications. PLEURA: Unremarkable. HEART/GREAT VESSELS: Heart is unremarkable for patient's age. No thoracic aortic aneurysm. MEDIASTINUM AND OMAR: Unremarkable. CHEST WALL AND LOWER NECK: Unremarkable. ABDOMEN LIVER/BILIARY TREE: Hepatic steatosis. No suspicious mass. Normal hepatic contours. No biliary dilation. GALLBLADDER: Post cholecystectomy. SPLEEN: Unremarkable. PANCREAS: Unremarkable. ADRENAL GLANDS: Well-circumscribed 3.1 cm fat density left adrenal nodule is considered benign, either an adenoma or myelolipoma. Unremarkable right adrenal gland. KIDNEYS/URETERS: Unremarkable. No hydronephrosis. STOMACH AND BOWEL: Duodenal diverticuli. No dilated loops of bowel. APPENDIX: Normal. ABDOMINOPELVIC CAVITY: No ascites. No pneumoperitoneum. No lymphadenopathy. VESSELS: Unremarkable for patient's age. PELVIS REPRODUCTIVE ORGANS: Post hysterectomy. URINARY BLADDER: Unremarkable. ABDOMINAL WALL/INGUINAL REGIONS: Fat-containing ventral hernia. BONES: No acute fracture or suspicious osseous lesion. Spinal degenerative changes.     Impression: No acute findings in the chest, abdomen or pelvis within the limits of unenhanced technique. Resident: VERA  William MAURO, the attending radiologist, have reviewed the images and agree with the final report above. Workstation performed: [id number]     CT head without contrast    Result Date: 7/1/2024  Narrative: CT BRAIN - WITHOUT CONTRAST INDICATION:   AMS. COMPARISON:  None. TECHNIQUE:  CT examination of the brain was performed.  Multiplanar 2D reformatted images were created from the source data. Radiation dose length product (DLP) for this visit:  917 mGy-cm .  This examination, like all CT scans performed in the [facility], was performed utilizing techniques to minimize radiation dose exposure, including the use of iterative reconstruction and automated exposure control. IMAGE QUALITY:  Diagnostic. FINDINGS: PARENCHYMA:  No intracranial mass, mass effect or midline shift. No CT signs of acute infarction.  No acute parenchymal hemorrhage. There is mild periventricular white matter low attenuation which is nonspecific and most likely related to chronic small vessel ischemic changes. VENTRICLES AND EXTRA-AXIAL SPACES:  Normal for the patient's age. VISUALIZED ORBITS: Normal visualized orbits. PARANASAL SINUSES: Normal visualized paranasal sinuses. CALVARIUM AND EXTRACRANIAL SOFT TISSUES:  Normal.     Impression: No acute intracranial abnormality. Workstation performed: [id number]     EKG: QTc= 447 on 7/3/2024       Code Status: Level 1 - Full Code  Advance Directive and Living Will:       Power of :      Suicide/Homicide Risk Assessment:  Unable to be assessed as patient is nonverbal with altered mental status    Quincy Franco MD 07/04/24  Psychiatry Resident, PGY-II    This note was completed in part utilizing [employer] Direct Software. Grammatical, translation, syntax errors, random word insertions, spelling mistakes, and incomplete sentences may be an occasional consequence of this system secondary to software limitations with voice recognition, ambient noise, and hardware issues. If  you have any questions or concerns about the content, text, or information contained within the body of this dictation, please contact the provider for clarification.

## 2024-07-04 NOTE — ASSESSMENT & PLAN NOTE
Lab Results   Component Value Date    HGBA1C 6.4 (H) 05/03/2024       Recent Labs     07/03/24  1804 07/03/24  2045 07/04/24  0542 07/04/24  1135   POCGLU 113 124 104 109       Blood Sugar Average: Last 72 hrs:  (P) 112.5

## 2024-07-04 NOTE — ASSESSMENT & PLAN NOTE
Initially admitted to [facility] inpatient behavioral health unit.  There was concern for atypical NMS given diaphoresis, tachycardia, episode of hypertension, and mild rigidity.  Her blood pressure and rigidity has improved.  Her CK is mildly elevated likely from rhabdomyolysis.  Currently, she is not meeting NMS criteria per psychiatry  Continue one-to-one  Appreciate psychiatry input  Can return to behavioral health unit once medically stable

## 2024-07-04 NOTE — PROGRESS NOTES
[facility]  Progress Note  Name: Meli Nowak I  MRN: [id number]  Unit/Bed#: 7T [facility] 717-01 I Date of Admission: 7/3/2024   Date of Service: 7/4/2024 I Hospital Day: 1    Assessment & Plan   * NICHOLAS (acute kidney injury) (Formerly [facility])  Assessment & Plan  POA, as evidenced by creatinine 1.8 with a baseline near 0.9, likely prerenal due to dehydration  Given 1 L of Isolyte followed by maintenance fluids  Cr today: 1.26  Continue gentle IVF given poor po intake   Limit nephrotoxic agents and avoid hypotension  Monitor I&O and PVRs  Creatinine and CK in the morning  7/4-creatinine improved and currently 1.26; NICHOLAS resolved.  Will continue gentle IV hydration and monitor CK on morning labs.    SIRS (systemic inflammatory response syndrome) (Formerly [facility])  Assessment & Plan  POA, as evidenced by tachycardia and leukocytosis  No source of infection but suspicious for UTI   CT C/A/P unremarkable   Empirically start IV Rocephin  Follow-up up blood and urine cultures, Pro-Yves and lactic acidosis  Trend fever curve and CBC with differential  7/4-update: White count trending down; procalcitonin essentially negative X2.  Will continue antibiotics, day 2/3.  Likely stable for return to behavioral health unit in next 24 to 48 hours.    Abdominal distension  Assessment & Plan  Abdominal distention on exam, no signs of pain or discomfort with palpation, suspecting hernia   CT A/P unremarkable   Will hold off on starting flagyl at this time   Bowel regimen   I+O, PVRs    Rhabdomyolysis  Assessment & Plan   likely due to NICHOLAS  Given 1 L of isolate  Continue IV fluids and repeat CK in the morning  Continues to rise to 749 on 7/4; continue IV fluids  Repeat morning labs    Abnormal urinalysis  Assessment & Plan  Urinalysis abnormal with mucus threads and WBCs  Follow-up urine culture  Okay to straight cath if needed for sample  Empirically start IV Rocephin    Psychosis (HCC)  Assessment & Plan  Initially admitted to  [place] inpatient behavioral health unit.  There was concern for atypical NMS given diaphoresis, tachycardia, episode of hypertension, and mild rigidity.  Her blood pressure and rigidity has improved.  Her CK is mildly elevated likely from rhabdomyolysis.  Currently, she is not meeting NMS criteria per psychiatry  Continue one-to-one  Appreciate psychiatry input  Can return to behavioral health unit once medically stable    Reactive airway disease  Assessment & Plan  Stable, no signs of exacerbation  Continue home Arnuity and Albuterol PRN     Tobacco abuse  Assessment & Plan  Patient unable to state whether she continues to smoke  Consider nicotine patch if needed    Obesity  Assessment & Plan  Would benefit from weight loss and therapeutic lifestyle changes  Education and counseling as tolerated   Consider nutrition evaluation       Type 2 diabetes mellitus (HCC)  Assessment & Plan  Lab Results   Component Value Date    HGBA1C 6.4 (H) 05/03/2024       Recent Labs     07/03/24  1211 07/03/24  1804 07/03/24  2045 07/04/24  0542   POCGLU 134 113 124 104         Blood Sugar Average: Last 72 hrs:  (P) [id number] 6.4  Hold home Metformin  Monitor ACCU checks AC + HS with lispro insulin sliding scale coverage                VTE Pharmacologic Prophylaxis:   Moderate Risk (Score 3-4) - Pharmacological DVT Prophylaxis Ordered: heparin.    Mobility:   Basic Mobility Inpatient Raw Score: 20  JH-HLM Goal: 6: Walk 10 steps or more  JH-HLM Achieved: 3: Sit at edge of bed  JH-HLM Goal NOT achieved. Continue with multidisciplinary rounding and encourage appropriate mobility to improve upon JH-HLM goals.    Patient Centered Rounds: I performed bedside rounds with nursing staff today.   Discussions with Specialists or Other Care Team Provider: nursing, CM, neurology, psychiatry     Education and Discussions with Family / Patient: {Family Communication:77537}    Total Time Spent on Date of Encounter in care of  patient: *** mins. This time was spent on one or more of the following: performing physical exam; counseling and coordination of care; obtaining or reviewing history; documenting in the medical record; reviewing/ordering tests, medications or procedures; communicating with other healthcare professionals and discussing with patient's family/caregivers.    Current Length of Stay: 1 day(s)  Current Patient Status: Inpatient   Certification Statement: {Certification Statement:06258}  Discharge Plan: {Discharge Plan:46296}    Code Status: Level 1 - Full Code    Subjective:   ***    Objective:     Vitals:   Temp (24hrs), Av.6 °F (37 °C), Min:98.2 °F (36.8 °C), Max:99 °F (37.2 °C)    Temp:  [98.2 °F (36.8 °C)-99 °F (37.2 °C)] 99 °F (37.2 °C)  HR:  [] 94  Resp:  [19-20] 19  BP: (171-175)/(82-93) 175/93  SpO2:  [91 %] 91 %  Body mass index is 37.28 kg/m².     Input and Output Summary (last 24 hours):     Intake/Output Summary (Last 24 hours) at 2024 1342  Last data filed at 2024 1309  Gross per 24 hour   Intake 1861.25 ml   Output 300 ml   Net 1561.25 ml       Physical Exam:   Physical Exam ***    Additional Data:     Labs:  Results from last 7 days   Lab Units 24  0529   WBC Thousand/uL 9.83   HEMOGLOBIN g/dL 11.7   HEMATOCRIT % 37.3   PLATELETS Thousands/uL 264   SEGS PCT % 68   LYMPHO PCT % 20   MONO PCT % 11   EOS PCT % 0     Results from last 7 days   Lab Units 24  0529   SODIUM mmol/L 145   POTASSIUM mmol/L 3.7   CHLORIDE mmol/L 106   CO2 mmol/L 27   BUN mg/dL 39*   CREATININE mg/dL 1.26   ANION GAP mmol/L 12   CALCIUM mg/dL 9.5   ALBUMIN g/dL 4.3   TOTAL BILIRUBIN mg/dL 0.54   ALK PHOS U/L 84   ALT U/L 18   AST U/L 36   GLUCOSE RANDOM mg/dL 96     Results from last 7 days   Lab Units 24  2049   INR  1.04     Results from last 7 days   Lab Units 24  1135 24  0542 24  2045 24  1804 24  1211 24  0555 24  0301 24  2021 24  0732   POC  GLUCOSE mg/dl 109 104 124 113 134 135 104 88 116         Results from last 7 days   Lab Units 07/04/24  0529 07/03/24  1715   LACTIC ACID mmol/L  --  0.8   PROCALCITONIN ng/ml 0.21 0.26*       Lines/Drains:  Invasive Devices       Peripheral Intravenous Line  Duration             Peripheral IV 07/04/24 Left Antecubital <1 day                          Imaging: Reviewed radiology reports from this admission including: chest xray, chest CT scan, abdominal/pelvic CT, and CT head    Recent Cultures (last 7 days):   Results from last 7 days   Lab Units 07/03/24  1330   BLOOD CULTURE  Received in Microbiology Lab. Culture in Progress.  Received in Microbiology Lab. Culture in Progress.       Last 24 Hours Medication List:   Current Facility-Administered Medications   Medication Dose Route Frequency Provider Last Rate    acetaminophen  650 mg Oral Q4H PRN JOSE ELIAS Ortega      acetaminophen  650 mg Oral Q4H PRN JOSE ELIAS Ortega      acetaminophen  975 mg Oral Q6H PRN JOSE ELIAS Ortega      albuterol  2 puff Inhalation Q4H PRN JOSE ELIAS Ortega      aluminum-magnesium hydroxide-simethicone  30 mL Oral Q4H PRN JOSE ELIAS Ortega      aspirin  81 mg Oral Daily JOSE ELIAS Ortega      cefTRIAXone  1,000 mg Intravenous Q24H JOSE ELIAS Ortega Stopped (07/03/24 6504)    cyanocobalamin  1,000 mcg Oral Daily JOSE ELIAS Ortega      fluticasone  1 puff Inhalation Daily JOSE ELIAS Ortega      heparin (porcine)  5,000 Units Subcutaneous Q8H [facility] JOSE ELIAS Ortega      hydrALAZINE  10 mg Intravenous Q6H PRN Murray Pandey MD      hydrOXYzine HCL  25 mg Oral Q6H PRN Max 4/day JOSE ELIAS Ortega      hydrOXYzine HCL  50 mg Oral Q6H PRN Max 4/day JOSE ELIAS Ortega      insulin lispro  1-5 Units Subcutaneous TID AC JOSE ELIAS Ortega      insulin lispro  1-5 Units Subcutaneous HS Kristen Logan  JOSE ELIAS      LORazepam  2 mg Intravenous Q4H Quincy Garcia MD      Or    LORazepam  2 mg Intramuscular Q4H Quincy Garcia MD      melatonin  3 mg Oral HS JOSE ELIAS Ortega      multi-electrolyte  100 mL/hr Intravenous Continuous Murray Pandey  mL/hr (07/04/24 0605)    nicotine polacrilex  4 mg Oral Q2H PRN JOSE ELIAS Ortega      ondansetron  4 mg Oral Q8H PRN JOSE ELIAS Ortega      polyethylene glycol  17 g Oral Daily PRN JOSE ELIAS Ortega      polyethylene glycol  17 g Oral Daily JOSE ELIAS Ortega      senna  2 tablet Oral HS JOSE ELIAS Ortega      senna-docusate sodium  1 tablet Oral Daily PRN JOSE ELIAS Ortega      traZODone  50 mg Oral HS PRN JOSE ELIAS Ortega          Today, Patient Was Seen By: JOSE ELIAS Ortega    **Please Note: This note may have been constructed using a voice recognition system.**

## 2024-07-04 NOTE — ASSESSMENT & PLAN NOTE
likely due to NICHOLAS  Given 1 L of isolate  Continue IV fluids and repeat CK in the morning  Continues to rise to 749 on 7/4; continue IV fluids  Repeat morning labs

## 2024-07-05 ENCOUNTER — HOSPITAL ENCOUNTER (INPATIENT)
Facility: HOSPITAL | Age: 59
LOS: 6 days | DRG: 682 | End: 2024-07-11
Attending: INTERNAL MEDICINE | Admitting: INTERNAL MEDICINE
Payer: MEDICARE

## 2024-07-05 VITALS
OXYGEN SATURATION: 92 % | HEIGHT: 68 IN | HEART RATE: 85 BPM | WEIGHT: 244.27 LBS | SYSTOLIC BLOOD PRESSURE: 180 MMHG | RESPIRATION RATE: 20 BRPM | TEMPERATURE: 97.6 F | DIASTOLIC BLOOD PRESSURE: 79 MMHG | BODY MASS INDEX: 37.02 KG/M2

## 2024-07-05 DIAGNOSIS — Z00.8 MEDICAL CLEARANCE FOR PSYCHIATRIC ADMISSION: ICD-10-CM

## 2024-07-05 DIAGNOSIS — F25.0 SCHIZOAFFECTIVE DISORDER, BIPOLAR TYPE (HCC): ICD-10-CM

## 2024-07-05 DIAGNOSIS — R65.10 SIRS (SYSTEMIC INFLAMMATORY RESPONSE SYNDROME) (HCC): ICD-10-CM

## 2024-07-05 DIAGNOSIS — F29 PSYCHOSIS (HCC): Primary | ICD-10-CM

## 2024-07-05 PROBLEM — E87.0 ACUTE HYPERNATREMIA: Status: ACTIVE | Noted: 2024-07-05

## 2024-07-05 LAB
ALBUMIN SERPL BCG-MCNC: 4.2 G/DL (ref 3.5–5)
ALP SERPL-CCNC: 78 U/L (ref 34–104)
ALT SERPL W P-5'-P-CCNC: 22 U/L (ref 7–52)
ANION GAP SERPL CALCULATED.3IONS-SCNC: 10 MMOL/L (ref 4–13)
ANION GAP SERPL CALCULATED.3IONS-SCNC: 7 MMOL/L (ref 4–13)
AST SERPL W P-5'-P-CCNC: 39 U/L (ref 13–39)
BACTERIA UR CULT: NORMAL
BASOPHILS # BLD AUTO: 0.08 THOUSANDS/ÂΜL (ref 0–0.1)
BASOPHILS NFR BLD AUTO: 1 % (ref 0–1)
BILIRUB SERPL-MCNC: 0.55 MG/DL (ref 0.2–1)
BUN SERPL-MCNC: 34 MG/DL (ref 5–25)
BUN SERPL-MCNC: 36 MG/DL (ref 5–25)
CALCIUM SERPL-MCNC: 8.9 MG/DL (ref 8.4–10.2)
CALCIUM SERPL-MCNC: 9.5 MG/DL (ref 8.4–10.2)
CHLORIDE SERPL-SCNC: 110 MMOL/L (ref 96–108)
CHLORIDE SERPL-SCNC: 113 MMOL/L (ref 96–108)
CK SERPL-CCNC: 586 U/L (ref 26–192)
CO2 SERPL-SCNC: 27 MMOL/L (ref 21–32)
CO2 SERPL-SCNC: 30 MMOL/L (ref 21–32)
CREAT SERPL-MCNC: 0.87 MG/DL (ref 0.6–1.3)
CREAT SERPL-MCNC: 0.92 MG/DL (ref 0.6–1.3)
EOSINOPHIL # BLD AUTO: 0 THOUSAND/ÂΜL (ref 0–0.61)
EOSINOPHIL NFR BLD AUTO: 0 % (ref 0–6)
ERYTHROCYTE [DISTWIDTH] IN BLOOD BY AUTOMATED COUNT: 16.9 % (ref 11.6–15.1)
GFR SERPL CREATININE-BSD FRML MDRD: 69 ML/MIN/1.73SQ M
GFR SERPL CREATININE-BSD FRML MDRD: 74 ML/MIN/1.73SQ M
GLUCOSE SERPL-MCNC: 111 MG/DL (ref 65–140)
GLUCOSE SERPL-MCNC: 76 MG/DL (ref 65–140)
GLUCOSE SERPL-MCNC: 81 MG/DL (ref 65–140)
GLUCOSE SERPL-MCNC: 86 MG/DL (ref 65–140)
GLUCOSE SERPL-MCNC: 90 MG/DL (ref 65–140)
GLUCOSE SERPL-MCNC: 92 MG/DL (ref 65–140)
GLUCOSE SERPL-MCNC: 93 MG/DL (ref 65–140)
GLUCOSE SERPL-MCNC: 94 MG/DL (ref 65–140)
HCT VFR BLD AUTO: 40.7 % (ref 34.8–46.1)
HGB BLD-MCNC: 12.6 G/DL (ref 11.5–15.4)
IMM GRANULOCYTES # BLD AUTO: 0.04 THOUSAND/UL (ref 0–0.2)
IMM GRANULOCYTES NFR BLD AUTO: 0 % (ref 0–2)
INR PPP: 0.9 (ref 0.84–1.19)
LYMPHOCYTES # BLD AUTO: 2.2 THOUSANDS/ÂΜL (ref 0.6–4.47)
LYMPHOCYTES NFR BLD AUTO: 23 % (ref 14–44)
MAGNESIUM SERPL-MCNC: 2.3 MG/DL (ref 1.9–2.7)
MCH RBC QN AUTO: 28.5 PG (ref 26.8–34.3)
MCHC RBC AUTO-ENTMCNC: 31 G/DL (ref 31.4–37.4)
MCV RBC AUTO: 92 FL (ref 82–98)
MONOCYTES # BLD AUTO: 1.18 THOUSAND/ÂΜL (ref 0.17–1.22)
MONOCYTES NFR BLD AUTO: 13 % (ref 4–12)
NEUTROPHILS # BLD AUTO: 5.95 THOUSANDS/ÂΜL (ref 1.85–7.62)
NEUTS SEG NFR BLD AUTO: 63 % (ref 43–75)
NRBC BLD AUTO-RTO: 0 /100 WBCS
PHOSPHATE SERPL-MCNC: 4.3 MG/DL (ref 2.7–4.5)
PLATELET # BLD AUTO: 303 THOUSANDS/UL (ref 149–390)
PMV BLD AUTO: 9.9 FL (ref 8.9–12.7)
POTASSIUM SERPL-SCNC: 3.4 MMOL/L (ref 3.5–5.3)
POTASSIUM SERPL-SCNC: 3.9 MMOL/L (ref 3.5–5.3)
PROCALCITONIN SERPL-MCNC: 0.1 NG/ML
PROT SERPL-MCNC: 6.9 G/DL (ref 6.4–8.4)
PROTHROMBIN TIME: 12.4 SECONDS (ref 11.6–14.5)
RBC # BLD AUTO: 4.42 MILLION/UL (ref 3.81–5.12)
SODIUM SERPL-SCNC: 147 MMOL/L (ref 135–147)
SODIUM SERPL-SCNC: 150 MMOL/L (ref 135–147)
WBC # BLD AUTO: 9.45 THOUSAND/UL (ref 4.31–10.16)

## 2024-07-05 PROCEDURE — G0426 INPT/ED TELECONSULT50: HCPCS | Performed by: PSYCHIATRY & NEUROLOGY

## 2024-07-05 PROCEDURE — 83735 ASSAY OF MAGNESIUM: CPT | Performed by: INTERNAL MEDICINE

## 2024-07-05 PROCEDURE — 80053 COMPREHEN METABOLIC PANEL: CPT | Performed by: INTERNAL MEDICINE

## 2024-07-05 PROCEDURE — 84100 ASSAY OF PHOSPHORUS: CPT | Performed by: INTERNAL MEDICINE

## 2024-07-05 PROCEDURE — 82948 REAGENT STRIP/BLOOD GLUCOSE: CPT

## 2024-07-05 PROCEDURE — 99239 HOSP IP/OBS DSCHRG MGMT >30: CPT | Performed by: INTERNAL MEDICINE

## 2024-07-05 PROCEDURE — 99232 SBSQ HOSP IP/OBS MODERATE 35: CPT | Performed by: PSYCHIATRY & NEUROLOGY

## 2024-07-05 PROCEDURE — 85025 COMPLETE CBC W/AUTO DIFF WBC: CPT | Performed by: INTERNAL MEDICINE

## 2024-07-05 PROCEDURE — 85610 PROTHROMBIN TIME: CPT | Performed by: INTERNAL MEDICINE

## 2024-07-05 PROCEDURE — 80048 BASIC METABOLIC PNL TOTAL CA: CPT | Performed by: NURSE PRACTITIONER

## 2024-07-05 PROCEDURE — 99223 1ST HOSP IP/OBS HIGH 75: CPT | Performed by: INTERNAL MEDICINE

## 2024-07-05 PROCEDURE — 82550 ASSAY OF CK (CPK): CPT | Performed by: INTERNAL MEDICINE

## 2024-07-05 PROCEDURE — 84145 PROCALCITONIN (PCT): CPT

## 2024-07-05 RX ORDER — MAGNESIUM HYDROXIDE/ALUMINUM HYDROXICE/SIMETHICONE 120; 1200; 1200 MG/30ML; MG/30ML; MG/30ML
30 SUSPENSION ORAL EVERY 4 HOURS PRN
Status: CANCELLED | OUTPATIENT
Start: 2024-07-05

## 2024-07-05 RX ORDER — HYDROXYZINE HYDROCHLORIDE 25 MG/1
25 TABLET, FILM COATED ORAL
Status: CANCELLED | OUTPATIENT
Start: 2024-07-05

## 2024-07-05 RX ORDER — INSULIN LISPRO 100 [IU]/ML
1-5 INJECTION, SOLUTION INTRAVENOUS; SUBCUTANEOUS EVERY 6 HOURS
Status: DISCONTINUED | OUTPATIENT
Start: 2024-07-05 | End: 2024-07-06

## 2024-07-05 RX ORDER — HYDRALAZINE HYDROCHLORIDE 20 MG/ML
10 INJECTION INTRAMUSCULAR; INTRAVENOUS EVERY 6 HOURS PRN
Status: CANCELLED | OUTPATIENT
Start: 2024-07-05

## 2024-07-05 RX ORDER — INSULIN LISPRO 100 [IU]/ML
1-5 INJECTION, SOLUTION INTRAVENOUS; SUBCUTANEOUS
Status: DISCONTINUED | OUTPATIENT
Start: 2024-07-05 | End: 2024-07-05

## 2024-07-05 RX ORDER — CEFTRIAXONE 1 G/50ML
1000 INJECTION, SOLUTION INTRAVENOUS EVERY 24 HOURS
Status: CANCELLED | OUTPATIENT
Start: 2024-07-05 | End: 2024-07-10

## 2024-07-05 RX ORDER — INSULIN LISPRO 100 [IU]/ML
1-5 INJECTION, SOLUTION INTRAVENOUS; SUBCUTANEOUS
Status: DISCONTINUED | OUTPATIENT
Start: 2024-07-06 | End: 2024-07-05

## 2024-07-05 RX ORDER — SENNOSIDES 8.6 MG
2 TABLET ORAL
Status: CANCELLED | OUTPATIENT
Start: 2024-07-05

## 2024-07-05 RX ORDER — LORAZEPAM 2 MG/ML
2 INJECTION INTRAMUSCULAR EVERY 4 HOURS
Status: DISCONTINUED | OUTPATIENT
Start: 2024-07-05 | End: 2024-07-05

## 2024-07-05 RX ORDER — ONDANSETRON 4 MG/1
4 TABLET, ORALLY DISINTEGRATING ORAL EVERY 8 HOURS PRN
Status: DISCONTINUED | OUTPATIENT
Start: 2024-07-05 | End: 2024-07-11 | Stop reason: HOSPADM

## 2024-07-05 RX ORDER — ACETAMINOPHEN 325 MG/1
650 TABLET ORAL EVERY 4 HOURS PRN
Status: CANCELLED | OUTPATIENT
Start: 2024-07-05

## 2024-07-05 RX ORDER — INSULIN LISPRO 100 [IU]/ML
1-5 INJECTION, SOLUTION INTRAVENOUS; SUBCUTANEOUS
Status: CANCELLED | OUTPATIENT
Start: 2024-07-05

## 2024-07-05 RX ORDER — ACETAMINOPHEN 325 MG/1
650 TABLET ORAL EVERY 4 HOURS PRN
Status: DISCONTINUED | OUTPATIENT
Start: 2024-07-05 | End: 2024-07-11 | Stop reason: HOSPADM

## 2024-07-05 RX ORDER — LORAZEPAM 2 MG/ML
2 INJECTION INTRAMUSCULAR EVERY 4 HOURS
Status: CANCELLED | OUTPATIENT
Start: 2024-07-05

## 2024-07-05 RX ORDER — ALBUTEROL SULFATE 90 UG/1
2 AEROSOL, METERED RESPIRATORY (INHALATION) EVERY 4 HOURS PRN
Status: DISCONTINUED | OUTPATIENT
Start: 2024-07-05 | End: 2024-07-11 | Stop reason: HOSPADM

## 2024-07-05 RX ORDER — TRAZODONE HYDROCHLORIDE 50 MG/1
50 TABLET ORAL
Status: DISCONTINUED | OUTPATIENT
Start: 2024-07-05 | End: 2024-07-11 | Stop reason: HOSPADM

## 2024-07-05 RX ORDER — LORAZEPAM 2 MG/ML
2 INJECTION INTRAMUSCULAR EVERY 4 HOURS
Status: DISCONTINUED | OUTPATIENT
Start: 2024-07-05 | End: 2024-07-05 | Stop reason: SDUPTHER

## 2024-07-05 RX ORDER — AMOXICILLIN 250 MG
1 CAPSULE ORAL DAILY PRN
Status: DISCONTINUED | OUTPATIENT
Start: 2024-07-05 | End: 2024-07-11 | Stop reason: HOSPADM

## 2024-07-05 RX ORDER — ACETAMINOPHEN 325 MG/1
975 TABLET ORAL EVERY 6 HOURS PRN
Status: DISCONTINUED | OUTPATIENT
Start: 2024-07-05 | End: 2024-07-06

## 2024-07-05 RX ORDER — LANOLIN ALCOHOL/MO/W.PET/CERES
3 CREAM (GRAM) TOPICAL
Status: CANCELLED | OUTPATIENT
Start: 2024-07-05

## 2024-07-05 RX ORDER — DEXTROSE MONOHYDRATE 50 MG/ML
100 INJECTION, SOLUTION INTRAVENOUS CONTINUOUS
Status: DISCONTINUED | OUTPATIENT
Start: 2024-07-05 | End: 2024-07-05 | Stop reason: HOSPADM

## 2024-07-05 RX ORDER — LORAZEPAM 2 MG/ML
2 INJECTION INTRAMUSCULAR EVERY 4 HOURS
Status: DISCONTINUED | OUTPATIENT
Start: 2024-07-05 | End: 2024-07-07

## 2024-07-05 RX ORDER — POLYETHYLENE GLYCOL 3350 17 G/17G
17 POWDER, FOR SOLUTION ORAL DAILY
Status: DISCONTINUED | OUTPATIENT
Start: 2024-07-06 | End: 2024-07-11 | Stop reason: HOSPADM

## 2024-07-05 RX ORDER — HYDRALAZINE HYDROCHLORIDE 20 MG/ML
10 INJECTION INTRAMUSCULAR; INTRAVENOUS EVERY 6 HOURS PRN
Status: DISCONTINUED | OUTPATIENT
Start: 2024-07-05 | End: 2024-07-05 | Stop reason: HOSPADM

## 2024-07-05 RX ORDER — AMOXICILLIN 250 MG
1 CAPSULE ORAL DAILY PRN
Status: CANCELLED | OUTPATIENT
Start: 2024-07-05

## 2024-07-05 RX ORDER — ACETAMINOPHEN 10 MG/ML
1000 INJECTION, SOLUTION INTRAVENOUS EVERY 6 HOURS PRN
Status: DISCONTINUED | OUTPATIENT
Start: 2024-07-05 | End: 2024-07-06

## 2024-07-05 RX ORDER — POLYETHYLENE GLYCOL 3350 17 G/17G
17 POWDER, FOR SOLUTION ORAL DAILY
Status: CANCELLED | OUTPATIENT
Start: 2024-07-06

## 2024-07-05 RX ORDER — ACETAMINOPHEN 325 MG/1
975 TABLET ORAL EVERY 6 HOURS PRN
Status: CANCELLED | OUTPATIENT
Start: 2024-07-05

## 2024-07-05 RX ORDER — DEXTROSE MONOHYDRATE 50 MG/ML
100 INJECTION, SOLUTION INTRAVENOUS CONTINUOUS
Status: DISCONTINUED | OUTPATIENT
Start: 2024-07-05 | End: 2024-07-05

## 2024-07-05 RX ORDER — MAGNESIUM HYDROXIDE/ALUMINUM HYDROXICE/SIMETHICONE 120; 1200; 1200 MG/30ML; MG/30ML; MG/30ML
30 SUSPENSION ORAL EVERY 4 HOURS PRN
Status: DISCONTINUED | OUTPATIENT
Start: 2024-07-05 | End: 2024-07-11 | Stop reason: HOSPADM

## 2024-07-05 RX ORDER — ONDANSETRON 4 MG/1
4 TABLET, ORALLY DISINTEGRATING ORAL EVERY 8 HOURS PRN
Status: CANCELLED | OUTPATIENT
Start: 2024-07-05

## 2024-07-05 RX ORDER — VANCOMYCIN HYDROCHLORIDE 500 MG/100ML
500 INJECTION, SOLUTION INTRAVENOUS ONCE
Status: COMPLETED | OUTPATIENT
Start: 2024-07-05 | End: 2024-07-05

## 2024-07-05 RX ORDER — HYDRALAZINE HYDROCHLORIDE 20 MG/ML
10 INJECTION INTRAMUSCULAR; INTRAVENOUS EVERY 6 HOURS PRN
Status: DISCONTINUED | OUTPATIENT
Start: 2024-07-05 | End: 2024-07-11 | Stop reason: HOSPADM

## 2024-07-05 RX ORDER — ALBUTEROL SULFATE 90 UG/1
2 AEROSOL, METERED RESPIRATORY (INHALATION) EVERY 4 HOURS PRN
Status: CANCELLED | OUTPATIENT
Start: 2024-07-05

## 2024-07-05 RX ORDER — ACETAMINOPHEN 10 MG/ML
1000 INJECTION, SOLUTION INTRAVENOUS EVERY 6 HOURS PRN
Status: CANCELLED | OUTPATIENT
Start: 2024-07-05

## 2024-07-05 RX ORDER — HEPARIN SODIUM 5000 [USP'U]/ML
5000 INJECTION, SOLUTION INTRAVENOUS; SUBCUTANEOUS EVERY 8 HOURS SCHEDULED
Status: CANCELLED | OUTPATIENT
Start: 2024-07-05

## 2024-07-05 RX ORDER — ASPIRIN 81 MG/1
81 TABLET, CHEWABLE ORAL DAILY
Status: CANCELLED | OUTPATIENT
Start: 2024-07-06

## 2024-07-05 RX ORDER — ASPIRIN 81 MG/1
81 TABLET, CHEWABLE ORAL DAILY
Status: DISCONTINUED | OUTPATIENT
Start: 2024-07-06 | End: 2024-07-07

## 2024-07-05 RX ORDER — HYDROXYZINE HYDROCHLORIDE 25 MG/1
50 TABLET, FILM COATED ORAL
Status: DISCONTINUED | OUTPATIENT
Start: 2024-07-05 | End: 2024-07-11 | Stop reason: HOSPADM

## 2024-07-05 RX ORDER — SENNOSIDES 8.6 MG
2 TABLET ORAL
Status: DISCONTINUED | OUTPATIENT
Start: 2024-07-05 | End: 2024-07-11 | Stop reason: HOSPADM

## 2024-07-05 RX ORDER — DEXTROSE MONOHYDRATE 50 MG/ML
75 INJECTION, SOLUTION INTRAVENOUS CONTINUOUS
Status: DISPENSED | OUTPATIENT
Start: 2024-07-05 | End: 2024-07-06

## 2024-07-05 RX ORDER — LANOLIN ALCOHOL/MO/W.PET/CERES
3 CREAM (GRAM) TOPICAL
Status: DISCONTINUED | OUTPATIENT
Start: 2024-07-05 | End: 2024-07-11 | Stop reason: HOSPADM

## 2024-07-05 RX ORDER — DEXTROSE MONOHYDRATE 50 MG/ML
100 INJECTION, SOLUTION INTRAVENOUS CONTINUOUS
Status: CANCELLED | OUTPATIENT
Start: 2024-07-05 | End: 2024-07-06

## 2024-07-05 RX ORDER — VANCOMYCIN HYDROCHLORIDE 750 MG/150ML
750 INJECTION, SOLUTION INTRAVENOUS EVERY 8 HOURS
Status: DISCONTINUED | OUTPATIENT
Start: 2024-07-06 | End: 2024-07-06

## 2024-07-05 RX ORDER — TRAZODONE HYDROCHLORIDE 50 MG/1
50 TABLET ORAL
Status: CANCELLED | OUTPATIENT
Start: 2024-07-05

## 2024-07-05 RX ORDER — ACETAMINOPHEN 325 MG/1
650 TABLET ORAL EVERY 4 HOURS PRN
Status: DISCONTINUED | OUTPATIENT
Start: 2024-07-05 | End: 2024-07-05 | Stop reason: SDUPTHER

## 2024-07-05 RX ORDER — HYDROXYZINE 50 MG/1
50 TABLET, FILM COATED ORAL
Status: CANCELLED | OUTPATIENT
Start: 2024-07-05

## 2024-07-05 RX ADMIN — ACYCLOVIR SODIUM 650 MG: 500 INJECTION, SOLUTION INTRAVENOUS at 12:41

## 2024-07-05 RX ADMIN — VANCOMYCIN HYDROCHLORIDE 1750 MG: 1 INJECTION, POWDER, LYOPHILIZED, FOR SOLUTION INTRAVENOUS at 15:08

## 2024-07-05 RX ADMIN — POLYETHYLENE GLYCOL 3350 17 G: 17 POWDER, FOR SOLUTION ORAL at 09:17

## 2024-07-05 RX ADMIN — HYDRALAZINE HYDROCHLORIDE 10 MG: 20 INJECTION, SOLUTION INTRAMUSCULAR; INTRAVENOUS at 20:20

## 2024-07-05 RX ADMIN — LORAZEPAM 2 MG: 2 INJECTION INTRAMUSCULAR; INTRAVENOUS at 10:38

## 2024-07-05 RX ADMIN — CYANOCOBALAMIN TAB 1000 MCG 1000 MCG: 1000 TAB at 09:17

## 2024-07-05 RX ADMIN — LORAZEPAM 2 MG: 2 INJECTION INTRAMUSCULAR; INTRAVENOUS at 03:06

## 2024-07-05 RX ADMIN — HEPARIN SODIUM 5000 UNITS: 5000 INJECTION INTRAVENOUS; SUBCUTANEOUS at 05:46

## 2024-07-05 RX ADMIN — VANCOMYCIN HYDROCHLORIDE 500 MG: 500 INJECTION, SOLUTION INTRAVENOUS at 22:26

## 2024-07-05 RX ADMIN — SODIUM CHLORIDE, SODIUM GLUCONATE, SODIUM ACETATE, POTASSIUM CHLORIDE, MAGNESIUM CHLORIDE, SODIUM PHOSPHATE, DIBASIC, AND POTASSIUM PHOSPHATE 100 ML/HR: .53; .5; .37; .037; .03; .012; .00082 INJECTION, SOLUTION INTRAVENOUS at 06:07

## 2024-07-05 RX ADMIN — HEPARIN SODIUM 5000 UNITS: 5000 INJECTION INTRAVENOUS; SUBCUTANEOUS at 15:08

## 2024-07-05 RX ADMIN — ACETAMINOPHEN 1000 MG: 10 INJECTION INTRAVENOUS at 09:16

## 2024-07-05 RX ADMIN — CEFTRIAXONE 1000 MG: 1 INJECTION, SOLUTION INTRAVENOUS at 18:15

## 2024-07-05 RX ADMIN — DEXTROSE 100 ML/HR: 5 SOLUTION INTRAVENOUS at 09:17

## 2024-07-05 RX ADMIN — ACYCLOVIR SODIUM 825 MG: 50 INJECTION, SOLUTION INTRAVENOUS at 20:56

## 2024-07-05 RX ADMIN — HYDRALAZINE HYDROCHLORIDE 10 MG: 20 INJECTION, SOLUTION INTRAMUSCULAR; INTRAVENOUS at 05:56

## 2024-07-05 RX ADMIN — DEXTROSE 75 ML/HR: 5 SOLUTION INTRAVENOUS at 20:35

## 2024-07-05 RX ADMIN — LORAZEPAM 2 MG: 2 INJECTION INTRAMUSCULAR; INTRAVENOUS at 20:17

## 2024-07-05 RX ADMIN — LORAZEPAM 2 MG: 2 INJECTION INTRAMUSCULAR; INTRAVENOUS at 06:00

## 2024-07-05 RX ADMIN — DEXTROSE 100 ML/HR: 5 SOLUTION INTRAVENOUS at 20:11

## 2024-07-05 RX ADMIN — ASPIRIN 81 MG CHEWABLE TABLET 81 MG: 81 TABLET CHEWABLE at 09:17

## 2024-07-05 RX ADMIN — LORAZEPAM 2 MG: 2 INJECTION INTRAMUSCULAR; INTRAVENOUS at 15:09

## 2024-07-05 NOTE — PROGRESS NOTES
Meli Nowak is a 59 y.o. female who is currently ordered Vancomycin IV with management by the Pharmacy Consult service.  Relevant clinical data and objective / subjective history reviewed.  Vancomycin Assessment:  Indication and Goal AUC/Trough: CNS infection (goal -600, trough 15-20)  Clinical Status:  new order  Micro:   pending  Renal Function:  SCr: 0.92 mg/dL  CrCl: 88.1 mL/min  Renal replacement: Not on dialysis  Days of Therapy: 1  Current Dose: 1750 mg bolus  Vancomycin Plan:  New Dosinmg q12h  Estimated AUC: 569 mcg*hr/mL  Estimated Trough: 13 mcg/mL  Next Level: 24  am draw  Renal Function Monitoring: Daily BMP and UOP  Pharmacy will continue to follow closely for s/sx of nephrotoxicity, infusion reactions and appropriateness of therapy.  BMP and CBC will be ordered per protocol. We will continue to follow the patient’s culture results and clinical progress daily.    Shanell Tovar, Pharmacist

## 2024-07-05 NOTE — CONSULTS
TeleConsultation - Neurology   Meli Nowak 59 y.o. female MRN: [id number]  Unit/Bed#: 7T [facility] 702-01 Encounter: [id number]      VIRTUAL CARE DOCUMENTATION:     1. This service was provided via Telemedicine using [employer]     2. Parties in the room with patient during teleconsult RN    3. Confidentiality My office door was closed     4. Participants No one else was in the room    5. Patient acknowledged consent and understanding of privacy and security of the  Telemedicine consult. I informed the patient that I have reviewed their record in Epic and presented the opportunity for them to ask any questions regarding the visit today.  The patient agreed to participate.    6. Time spent 60 min       Assessment & Plan     Altered mental status, rigidity, SIRS +  Neuroleptic malignant syndrome is a consideration as has been documented before.  However, the alteration in mental status appears to have begun before the presentation, and the extent and duration of findings appear out of proportion to the rigidity noted on exam.    All factors considered, it is appropriate to start treating with Bromocriptine 2.5 is appropriate. C/w Ativan as needed for agitation. We do not believe Dantrolene is warranted as rigidity is not major.    At the same time, we recommend stat LP to exclude meningitis/encephalitis. This will likely need to be done under sedation, and hence it is appropriate to arrange for MRI brain w/wo contrast at the same time.    At the same time, we recommend empiric coverage with triple therapy (including acyclovir)     Neurology will follow    Recommendations for outpatient neurological follow up have yet to be determined.    History of Present Illness     Reason for Consult / Principal Problem: AMS  Hx and PE limited by: mental status  HPI: Meli Nowak is a 59 y.o. female who presents with confusion.    Patient has history of schizoaffective disorder. She presented to ED on 6/28/2024  accompanied by friend, mainly for paranoid thoughts and poor judgement.  She was again brought in by [facility] on  after being found confused on a bench, unable to answer questions.  While here, it was noted that she was sleepy, and had no insight into her condition.  She was initially admitted to psych unit, and received Olanzapine and Haloperidol. It was later noted that she was rigid, tremulous, had mild elevation in WBC, and mild Ckdemia, and so she was appropriately transferred to medical floor. All neuroleptics discontinued, and she was placed on hydration and ativan.   However, she remains very confused and unable to care for herself, and hence we were consulted.  Reviewing chart reveals that she had some tachycardia and elevation in BP. She also had low grade fever reaching 100.2      Inpatient consult to Neurology  Consult performed by: Greg Morgan MD  Consult ordered by: Murray Pandey MD             Historical Information   Past Medical History:   Diagnosis Date    Cognitive impairment     Diabetes mellitus (HCC)     Psychosis (HCC)      Past Surgical History:   Procedure Laterality Date     SECTION      CHOLECYSTECTOMY       Social History   Social History     Substance and Sexual Activity   Alcohol Use Never    Comment: Unable to determine     Social History     Substance and Sexual Activity   Drug Use No     E-Cigarette/Vaping    E-Cigarette Use Never User      E-Cigarette/Vaping Substances     Social History     Tobacco Use   Smoking Status Every Day    Current packs/day: 0.50    Types: Cigarettes   Smokeless Tobacco Never     Family History: non-contributory    Review of previous medical records was completed.     Meds/Allergies   current meds:   Current Facility-Administered Medications   Medication Dose Route Frequency    acetaminophen (Ofirmev) injection 1,000 mg  1,000 mg Intravenous Q6H PRN    acetaminophen (TYLENOL) tablet 650 mg  650 mg Oral Q4H PRN    acetaminophen (TYLENOL) tablet 650 mg   650 mg Oral Q4H PRN    acetaminophen (TYLENOL) tablet 975 mg  975 mg Oral Q6H PRN    acyclovir (ZOVIRAX) 650 mg in sodium chloride 0.9 % 100 mL IVPB  10 mg/kg (Ideal) Intravenous Q8H    albuterol (PROVENTIL HFA,VENTOLIN HFA) inhaler 2 puff  2 puff Inhalation Q4H PRN    aluminum-magnesium hydroxide-simethicone (MAALOX) oral suspension 30 mL  30 mL Oral Q4H PRN    aspirin chewable tablet 81 mg  81 mg Oral Daily    cefTRIAXone (ROCEPHIN) IVPB (premix in dextrose) 1,000 mg 50 mL  1,000 mg Intravenous Q24H    cyanocobalamin (VITAMIN B-12) tablet 1,000 mcg  1,000 mcg Oral Daily    dextrose 5 % infusion  100 mL/hr Intravenous Continuous    fluticasone (ARNUITY ELLIPTA) 100 MCG/ACT inhaler 1 puff  1 puff Inhalation Daily    heparin (porcine) subcutaneous injection 5,000 Units  5,000 Units Subcutaneous Q8H JONATHAN    hydrALAZINE (APRESOLINE) injection 10 mg  10 mg Intravenous Q6H PRN    hydrOXYzine HCL (ATARAX) tablet 25 mg  25 mg Oral Q6H PRN Max 4/day    hydrOXYzine HCL (ATARAX) tablet 50 mg  50 mg Oral Q6H PRN Max 4/day    insulin lispro (HumALOG/ADMELOG) 100 units/mL subcutaneous injection 1-5 Units  1-5 Units Subcutaneous TID AC    insulin lispro (HumALOG/ADMELOG) 100 units/mL subcutaneous injection 1-5 Units  1-5 Units Subcutaneous HS    LORazepam (ATIVAN) injection 2 mg  2 mg Intravenous Q4H    Or    LORazepam (ATIVAN) injection 2 mg  2 mg Intramuscular Q4H    melatonin tablet 3 mg  3 mg Oral HS    nicotine polacrilex (NICORETTE) gum 4 mg  4 mg Oral Q2H PRN    ondansetron (ZOFRAN-ODT) dispersible tablet 4 mg  4 mg Oral Q8H PRN    polyethylene glycol (MIRALAX) packet 17 g  17 g Oral Daily    senna (SENOKOT) tablet 17.2 mg  2 tablet Oral HS    senna-docusate sodium (SENOKOT S) 8.6-50 mg per tablet 1 tablet  1 tablet Oral Daily PRN    traZODone (DESYREL) tablet 50 mg  50 mg Oral HS PRN    vancomycin (VANCOCIN) 1,750 mg in sodium chloride 0.9 % 500 mL IVPB  15 mg/kg Intravenous Once       No Known Allergies    Objective  "  Vitals:Blood pressure 123/98, pulse 104, temperature 100.2 °F (37.9 °C), temperature source Temporal, resp. rate 20, height 5' 8\" (1.727 m), weight 111 kg (244 lb 4.3 oz), SpO2 94%.,Body mass index is 37.14 kg/m².    Intake/Output Summary (Last 24 hours) at 7/5/2024 1051  Last data filed at 7/5/2024 0607  Gross per 24 hour   Intake 1951.67 ml   Output 175 ml   Net 1776.67 ml       Invasive Devices:   Invasive Devices       Peripheral Intravenous Line  Duration             Peripheral IV 07/04/24 Left Antecubital <1 day                    Physical Exam   Lying in bed  Skin: no seen lesions  HEENT: ATNC  Chest: breathing comfortably on room air      Neurologic Exam  Alert. When asked her name, she answered after repeated attempts. She did not answer other questions. She can follow some simple commands, like attempting to sit up when asked. Attention spans appears short. She is tremulous overall.  She can support her weight.  When nurse asked to move her extremities, she noted some rigidity, however extremities were mobile with relative ease.        Lab Results: CBC:   Results from last 7 days   Lab Units 07/05/24  0554 07/04/24  0529 07/03/24  1715   WBC Thousand/uL 9.45 9.83 12.70*   RBC Million/uL 4.42 4.07 4.47   HEMOGLOBIN g/dL 12.6 11.7 12.8   HEMATOCRIT % 40.7 37.3 41.6   MCV fL 92 92 93   PLATELETS Thousands/uL 303 264 322   , BMP/CMP:   Results from last 7 days   Lab Units 07/05/24  0554 07/04/24  0529 07/03/24  1155   SODIUM mmol/L 150* 145 146   POTASSIUM mmol/L 3.9 3.7 4.0   CHLORIDE mmol/L 110* 106 106   CO2 mmol/L 30 27 24   BUN mg/dL 36* 39* 33*   CREATININE mg/dL 0.92 1.26 1.88*   CALCIUM mg/dL 9.5 9.5 10.4*   AST U/L 39 36 28   ALT U/L 22 18 17   ALK PHOS U/L 78 84 101   EGFR ml/min/1.73sq m 69 47 29   , Vitamin B12:   Results from last 7 days   Lab Units 07/03/24  1647   VITAMIN B 12 pg/mL 408   , HgBA1C:   , TSH:   Results from last 7 days   Lab Units 07/01/24  2049   TSH 3RD GENERATON uIU/mL 0.952 " "  , Coagulation:   Results from last 7 days   Lab Units 07/01/24  2049   INR  1.04   , Lipid Profile:   , Ammonia:   Results from last 7 days   Lab Units 07/03/24  1329   AMMONIA umol/L 32   , Urinalysis:   Results from last 7 days   Lab Units 07/02/24  0025   COLOR UA  Yellow   CLARITY UA  Turbid   SPEC GRAV UA  1.021   PH UA  7.5   LEUKOCYTES UA  Negative   NITRITE UA  Negative   GLUCOSE UA mg/dl Negative   KETONES UA mg/dl 20 (1+)*   BILIRUBIN UA  Small*   BLOOD UA  Negative   , Drug Screen:   Results from last 7 days   Lab Units 07/02/24  0025   BARBITURATE UR  Negative   BENZODIAZEPINE UR  Positive*   THC UR  Negative   COCAINE UR  Negative   METHADONE URINE  Negative   OPIATE UR  Negative   PCP UR  Negative   , Medication Drug Levels:       Invalid input(s): \"CARBAMAZEPINE\", \"OXCARBAZEPINE\"  Imaging Studies: I have personally reviewed pertinent films in PACS  EKG, Pathology, and Other Studies: I have personally reviewed pertinent reports.    VTE Prophylaxis: Sequential compression device (Venodyne)     Code Status: Level 1 - Full Code  Advance Directive and Living Will:      Power of :    POLST:      Counseling / Coordination of Care  N/A    "

## 2024-07-05 NOTE — NURSING NOTE
Discharge instructions given to receiving facility. Patient combative on transfer put in soft restraints.

## 2024-07-05 NOTE — CASE MANAGEMENT
303 hearing completed with [facility] MH/DS. Pt did NOT attend. Dr. Fitzgerald participated and testified. Up to 20 additional days of IP psych tx approved. 303 expires on 7/25/24, next hearing for the 304 if needed should be held on 7/23/24. Awaiting signed order to come over from the [facility] today, once this is received it will be forwarded to the CM on the medical unit as well.

## 2024-07-05 NOTE — PROGRESS NOTES
PSYCHIATRY CONSULT SERVICE  PROGRESS NOTE    Meli Nowak 59 y.o. female MRN: [id number]  Unit/Bed#: 7T [facility] 702-01 Encounter: [id number]     ASSESSMENT / PLAN     Meli Nowak is a 59 y.o. female with history of schizoaffective disorder and agoraphobia, who initially presented to [address], but currently presents on the medical unit 7T for altered mental status, stable vital signs, rigidity and concerns for NMS in the setting of possible abrupt discontinuation of Clozaril. Due to patient's autonomic dysfunction, elevated CK, rigidity, and AMS in the setting of exposure to dopamine antagonists will continue to treat for NMS. Continue medical treatment per primary team to rule out other etiologies such as infectious/metabolic/neurologic causes. Patient appears to be improving and is more verbal/less catatonic than previously noted. Patient should be transferred back to  once medically stable.  Will continue treatment with scheduled Ativan as patient appears to be improving with this.  Continue to hold other psychotropics. Will continue to follow while on the medical unit.     Principal Psychiatric Problem:  Schizoaffective disorder, bipolar type  Altered mental status  Differential diagnoses includes, but is not limited to metabolic/infectious/neurologic etiology vs atypical NMS vs hypoactive delirium      Principal Problem:    NICHOLAS (acute kidney injury) (HCC)  Active Problems:    Type 2 diabetes mellitus (HCC)    Psychosis (HCC)    Tobacco abuse    Reactive airway disease    SIRS (systemic inflammatory response syndrome) (HCC)    Abnormal urinalysis    Rhabdomyolysis    Abdominal distension    Acute hypernatremia      RECOMMENDED TREATMENT     Discussed plan with primary team as follows:  Hospital labs reviewed.  Collaborate with collaterals for further assessment and disposition as indicated.  Patient is currently on involuntary 303 commitment status.  Patient had 303 hearing this morning 7/5/2024.   "Patient will be readmitted to inpatient behavioral health once medically stable.  Recommend the following psychotropic medication regimen at this time:  Continue scheduled Ativan IV 4 mg every 4 hours or Ativan IM 4 mg every 4 hours if IV access is not available  Continue to hold all other psychotropics  Continue medical management per primary team.  Labs reviewed from 7/5/2024: CK trending down 749>586, procal normal, Na 150, Cl  110, BUN 36, phos improved to 4.3, CBC WNL, glucose borderline low   Observation level: In-person 1:1 continual observation  Psychiatry will continue to follow as needed. Please contact our service via Epic Chat with any additional questions or concerns. If contacting after hours, please call or Epic Chat the on-call team ([employer]: [phone]) with any questions or concerns.  Please reach out to on-call Psychiatry team via [employer] Chat, or if after hours/Fri/Sat/Sunday contact on-call psychiatric service via [employer] ([phone]) with any questions or concerns.         SUBJECTIVE     Patient was seen and evaluated for continuity of care. [place] appears disheveled, diaphoretic, uncomfortable, rigid, tremulous, with scant mumbled speech.  History is presently limited due to patient's current condition.  She reports \"okay\" mood.  Patient states that she is thirsty.  She reports that she knows that she is at [facility].  Patient does not answer when asked if she is having suicidal or homicidal ideation.  She does state that she is currently seeing things that scare her.  Patient does not answer when asked if she is having auditory hallucinations.    Psychiatric Review of Systems:  Behavior over the last 24 hours: unchanged  Sleep:  unable to assess due to patient factors  Appetite:  unable to assess due to patient factors  Medication side effects: none verbalized  ROS: Complete review of systems is negative except as noted above.    OBJECTIVE     Vital signs in last 24 hours:  Temp:  " "[98.4 °F (36.9 °C)-100.2 °F (37.9 °C)] 100.2 °F (37.9 °C)  HR:  [] 104  Resp:  [18-20] 20  BP: (123-183)/() 123/98    Mental Status Evaluation:  Appearance:  disheveled, wearing hospital clothes, looks older than stated age, obese, facial hair, laying on side in bed, diaphoretic   Behavior:  fair eye contact, restless, slow responses   Speech:  garbled speech, repeating words   Mood:  \"Ok\"   Affect:  Anxious, unable to fully assess   Language: unable to assess   Thought Process:  poverty of thought   Associations: thought blocking   Thought Content:  Unable to fully assess, possible paranoia with room scanning   Perceptual Disturbances: Endorses visual hallucinations, does not respond when asked about auditory hallucinations, appears to be preoccupied, does not appear to be responding to internal stimuli   Risk Potential: Suicidal ideation - does not answer  Homicidal ideation - does not answer  Potential for aggression - Not at present   Sensorium:  oriented to person and place   Memory:  patient does not answer   Consciousness:  alert and awake   Attention/Concentration: decreased concentration and decreased attention span   Intellect: unable to assess   Fund of Knowledge: Unable to assess   Insight:  limited   Judgment: limited   Muscle Strength Muscle Tone: normal  Cogwheel rigidity BUE   Gait/Station: in bed   Motor Activity: abnormal movement noted: coarse tremors in BUE and BLE, horizontal nystagmus           ACTIVE MEDICATIONS     Current Medications:  Current Facility-Administered Medications   Medication Dose Route Frequency Provider Last Rate    acetaminophen  1,000 mg Intravenous Q6H PRN Karen Rodríguez PA-C 1,000 mg (07/04/24 9633)    acetaminophen  650 mg Oral Q4H PRN JOSE ELIAS Ortega      acetaminophen  650 mg Oral Q4H PRN JOSE ELIAS Ortega      acetaminophen  975 mg Oral Q6H PRN JOSE ELIAS Ortega      albuterol  2 puff Inhalation Q4H PRN Kristen" JOSE ELIAS Meyer      aluminum-magnesium hydroxide-simethicone  30 mL Oral Q4H PRN Kristen JOSE ELIAS Meyer      aspirin  81 mg Oral Daily KristenJOSE ELIAS Estrada      cefTRIAXone  1,000 mg Intravenous Q24H Murray Pandey MD 1,000 mg (07/04/24 1730)    cyanocobalamin  1,000 mcg Oral Daily Kristen JOSE ELIAS Meyer      dextrose  100 mL/hr Intravenous Continuous Murray Pandey MD      fluticasone  1 puff Inhalation Daily KristenJOSE ELIAS Estrada      heparin (porcine)  5,000 Units Subcutaneous Q8H JONATHAN Kristen JOSE ELIAS Meyer      hydrALAZINE  10 mg Intravenous Q6H PRN Karen Rodríguez PA-C      hydrOXYzine HCL  25 mg Oral Q6H PRN Max 4/day Kristen TomasJOSE ELIAS Weller      hydrOXYzine HCL  50 mg Oral Q6H PRN Max 4/day Kristen JOSE ELIAS Meyer      insulin lispro  1-5 Units Subcutaneous TID AC Kristen JOSE ELIAS Meyer      insulin lispro  1-5 Units Subcutaneous HS Kristen JOSE ELIAS Meyer      LORazepam  2 mg Intravenous Q4H Quincy Garcia MD      Or    LORazepam  2 mg Intramuscular Q4H Quincy Garcia MD      melatonin  3 mg Oral HS Kristen TomasJOSE ELIAS Weller      nicotine polacrilex  4 mg Oral Q2H PRN Kristen JOSE ELIAS Meyer      ondansetron  4 mg Oral Q8H PRN Kristen JOSE ELIAS Meyer      polyethylene glycol  17 g Oral Daily KristenJOSE ELIAS Estrada      senna  2 tablet Oral HS Kristen JOSE ELIAS Meyer      senna-docusate sodium  1 tablet Oral Daily PRN KristenJOSE ELIAS Estrada      traZODone  50 mg Oral HS PRN KristenJOSE ELIAS Estrada         Behavioral Health Medications: I have personally reviewed all current active medications. Changes as in plan section above.      ADDITIONAL DATA     EKG Results: I have personally reviewed pertinent reports.  Results for orders placed or performed during the hospital encounter of 07/03/24 (from the past 1000 hour(s))   ECG 12 lead    Collection Time: 07/03/24  5:21 PM   Result Value    Ventricular  Rate 90    Atrial Rate 90    HI Interval 132    QRSD Interval 100    QT Interval 366    QTC Interval 447    P Axis 33    QRS Axis 38    T Wave Axis 112    Narrative    Normal sinus rhythm  Nonspecific ST and T wave abnormality  Abnormal ECG  No previous ECGs available  Confirmed by Levon Del Cid ([id number]) on 7/3/2024 10:59:54 PM     *Note: Due to a large number of results and/or encounters for the requested time period, some results have not been displayed. A complete set of results can be found in Results Review.       Radiology Results: I have personally reviewed pertinent reports. I have personally reviewed pertinent films in PACS.  XR chest portable    Result Date: 7/4/2024  Impression: No acute cardiopulmonary disease. Workstation performed: [id number]     CT head wo contrast    Result Date: 7/4/2024  Impression: No acute intracranial abnormality. Workstation performed: [id number]     XR chest portable    Result Date: 7/4/2024  Impression: No acute cardiopulmonary disease. Workstation performed: [id number]     CT chest abdomen pelvis wo contrast    Result Date: 7/3/2024  Impression: No acute findings in the chest, abdomen or pelvis within the limits of unenhanced technique. Resident: VERA Thomas I, the attending radiologist, have reviewed the images and agree with the final report above. Workstation performed: [id number]     XR chest portable    Result Date: 7/4/2024  Impression No acute cardiopulmonary disease. Workstation performed: [id number]     XR chest portable    Result Date: 7/4/2024  Impression No acute cardiopulmonary disease. Workstation performed: [id number]        Laboratory Results: I have personally reviewed all pertinent laboratory/tests results.  Recent Results (from the past 48 hour(s))   Comprehensive metabolic panel    Collection Time: 07/03/24 11:55 AM   Result Value Ref Range    Sodium 146 135 - 147 mmol/L    Potassium 4.0 3.5 - 5.3 mmol/L    Chloride 106 96 - 108 mmol/L    CO2 24 21 - 32  mmol/L    ANION GAP 16 (H) 4 - 13 mmol/L    BUN 33 (H) 5 - 25 mg/dL    Creatinine 1.88 (H) 0.60 - 1.30 mg/dL    Glucose 148 (H) 65 - 140 mg/dL    Calcium 10.4 (H) 8.4 - 10.2 mg/dL    AST 28 13 - 39 U/L    ALT 17 7 - 52 U/L    Alkaline Phosphatase 101 34 - 104 U/L    Total Protein 8.6 (H) 6.4 - 8.4 g/dL    Albumin 5.1 (H) 3.5 - 5.0 g/dL    Total Bilirubin 0.63 0.20 - 1.00 mg/dL    eGFR 29 ml/min/1.73sq m   Magnesium    Collection Time: 07/03/24 11:55 AM   Result Value Ref Range    Magnesium 2.2 1.9 - 2.7 mg/dL   Phosphorus    Collection Time: 07/03/24 11:55 AM   Result Value Ref Range    Phosphorus 4.6 (H) 2.7 - 4.5 mg/dL   C-reactive protein    Collection Time: 07/03/24 11:55 AM   Result Value Ref Range    CRP 4.1 (H) <3.0 mg/L   CBC and differential    Collection Time: 07/03/24 11:57 AM   Result Value Ref Range    WBC 14.89 (H) 4.31 - 10.16 Thousand/uL    RBC 5.18 (H) 3.81 - 5.12 Million/uL    Hemoglobin 14.8 11.5 - 15.4 g/dL    Hematocrit 47.9 (H) 34.8 - 46.1 %    MCV 93 82 - 98 fL    MCH 28.6 26.8 - 34.3 pg    MCHC 30.9 (L) 31.4 - 37.4 g/dL    RDW 17.2 (H) 11.6 - 15.1 %    MPV 9.7 8.9 - 12.7 fL    Platelets 378 149 - 390 Thousands/uL    nRBC 0 /100 WBCs    Segmented % 81 (H) 43 - 75 %    Immature Grans % 1 0 - 2 %    Lymphocytes % 10 (L) 14 - 44 %    Monocytes % 8 4 - 12 %    Eosinophils Relative 0 0 - 6 %    Basophils Relative 0 0 - 1 %    Absolute Neutrophils 12.08 (H) 1.85 - 7.62 Thousands/µL    Absolute Immature Grans 0.08 0.00 - 0.20 Thousand/uL    Absolute Lymphocytes 1.48 0.60 - 4.47 Thousands/µL    Absolute Monocytes 1.19 0.17 - 1.22 Thousand/µL    Eosinophils Absolute 0.00 0.00 - 0.61 Thousand/µL    Basophils Absolute 0.06 0.00 - 0.10 Thousands/µL   Fingerstick Glucose (POCT)    Collection Time: 07/03/24 12:11 PM   Result Value Ref Range    POC Glucose 134 65 - 140 mg/dl   CK    Collection Time: 07/03/24  1:29 PM   Result Value Ref Range    Total  (H) 26 - 192 U/L   Ammonia    Collection Time:  07/03/24  1:29 PM   Result Value Ref Range    Ammonia 32 18 - 72 umol/L   Blood culture    Collection Time: 07/03/24  1:30 PM    Specimen: Arm, Left; Blood   Result Value Ref Range    Blood Culture No Growth at 24 hrs.    Blood culture    Collection Time: 07/03/24  1:30 PM    Specimen: Arm, Left; Blood   Result Value Ref Range    Blood Culture No Growth at 24 hrs.    Vitamin D 25 hydroxy    Collection Time: 07/03/24  4:42 PM   Result Value Ref Range    Vit D, 25-Hydroxy 42.7 30.0 - 100.0 ng/mL   Vitamin B12    Collection Time: 07/03/24  4:47 PM   Result Value Ref Range    Vitamin B-12 408 180 - 914 pg/mL   Folate    Collection Time: 07/03/24  4:47 PM   Result Value Ref Range    Folate >22.3 >5.9 ng/mL   CBC and differential    Collection Time: 07/03/24  5:15 PM   Result Value Ref Range    WBC 12.70 (H) 4.31 - 10.16 Thousand/uL    RBC 4.47 3.81 - 5.12 Million/uL    Hemoglobin 12.8 11.5 - 15.4 g/dL    Hematocrit 41.6 34.8 - 46.1 %    MCV 93 82 - 98 fL    MCH 28.6 26.8 - 34.3 pg    MCHC 30.8 (L) 31.4 - 37.4 g/dL    RDW 16.8 (H) 11.6 - 15.1 %    MPV 9.1 8.9 - 12.7 fL    Platelets 322 149 - 390 Thousands/uL    nRBC 0 /100 WBCs    Segmented % 72 43 - 75 %    Immature Grans % 0 0 - 2 %    Lymphocytes % 14 14 - 44 %    Monocytes % 14 (H) 4 - 12 %    Eosinophils Relative 0 0 - 6 %    Basophils Relative 0 0 - 1 %    Absolute Neutrophils 9.03 (H) 1.85 - 7.62 Thousands/µL    Absolute Immature Grans 0.04 0.00 - 0.20 Thousand/uL    Absolute Lymphocytes 1.83 0.60 - 4.47 Thousands/µL    Absolute Monocytes 1.76 (H) 0.17 - 1.22 Thousand/µL    Eosinophils Absolute 0.00 0.00 - 0.61 Thousand/µL    Basophils Absolute 0.04 0.00 - 0.10 Thousands/µL   Lactic acid, plasma (w/reflex if result > 2.0)    Collection Time: 07/03/24  5:15 PM   Result Value Ref Range    LACTIC ACID 0.8 0.5 - 2.0 mmol/L   Procalcitonin    Collection Time: 07/03/24  5:15 PM   Result Value Ref Range    Procalcitonin 0.26 (H) <=0.25 ng/ml   ECG 12 lead    Collection  Time: 07/03/24  5:21 PM   Result Value Ref Range    Ventricular Rate 90 BPM    Atrial Rate 90 BPM    LA Interval 132 ms    QRSD Interval 100 ms    QT Interval 366 ms    QTC Interval 447 ms    P Axis 33 degrees    QRS Axis 38 degrees    T Wave Axis 112 degrees   Fingerstick Glucose (POCT)    Collection Time: 07/03/24  6:04 PM   Result Value Ref Range    POC Glucose 113 65 - 140 mg/dl   Fingerstick Glucose (POCT)    Collection Time: 07/03/24  8:45 PM   Result Value Ref Range    POC Glucose 124 65 - 140 mg/dl   Comprehensive metabolic panel    Collection Time: 07/04/24  5:29 AM   Result Value Ref Range    Sodium 145 135 - 147 mmol/L    Potassium 3.7 3.5 - 5.3 mmol/L    Chloride 106 96 - 108 mmol/L    CO2 27 21 - 32 mmol/L    ANION GAP 12 4 - 13 mmol/L    BUN 39 (H) 5 - 25 mg/dL    Creatinine 1.26 0.60 - 1.30 mg/dL    Glucose 96 65 - 140 mg/dL    Calcium 9.5 8.4 - 10.2 mg/dL    AST 36 13 - 39 U/L    ALT 18 7 - 52 U/L    Alkaline Phosphatase 84 34 - 104 U/L    Total Protein 7.3 6.4 - 8.4 g/dL    Albumin 4.3 3.5 - 5.0 g/dL    Total Bilirubin 0.54 0.20 - 1.00 mg/dL    eGFR 47 ml/min/1.73sq m   CBC and differential    Collection Time: 07/04/24  5:29 AM   Result Value Ref Range    WBC 9.83 4.31 - 10.16 Thousand/uL    RBC 4.07 3.81 - 5.12 Million/uL    Hemoglobin 11.7 11.5 - 15.4 g/dL    Hematocrit 37.3 34.8 - 46.1 %    MCV 92 82 - 98 fL    MCH 28.7 26.8 - 34.3 pg    MCHC 31.4 31.4 - 37.4 g/dL    RDW 17.1 (H) 11.6 - 15.1 %    MPV 9.3 8.9 - 12.7 fL    Platelets 264 149 - 390 Thousands/uL    nRBC 0 /100 WBCs    Segmented % 68 43 - 75 %    Immature Grans % 0 0 - 2 %    Lymphocytes % 20 14 - 44 %    Monocytes % 11 4 - 12 %    Eosinophils Relative 0 0 - 6 %    Basophils Relative 1 0 - 1 %    Absolute Neutrophils 6.70 1.85 - 7.62 Thousands/µL    Absolute Immature Grans 0.03 0.00 - 0.20 Thousand/uL    Absolute Lymphocytes 2.01 0.60 - 4.47 Thousands/µL    Absolute Monocytes 1.03 0.17 - 1.22 Thousand/µL    Eosinophils Absolute 0.00  0.00 - 0.61 Thousand/µL    Basophils Absolute 0.06 0.00 - 0.10 Thousands/µL   Magnesium    Collection Time: 07/04/24  5:29 AM   Result Value Ref Range    Magnesium 2.4 1.9 - 2.7 mg/dL   Phosphorus    Collection Time: 07/04/24  5:29 AM   Result Value Ref Range    Phosphorus 5.2 (H) 2.7 - 4.5 mg/dL   Procalcitonin    Collection Time: 07/04/24  5:29 AM   Result Value Ref Range    Procalcitonin 0.21 <=0.25 ng/ml   CK    Collection Time: 07/04/24  5:29 AM   Result Value Ref Range    Total  (H) 26 - 192 U/L   Fingerstick Glucose (POCT)    Collection Time: 07/04/24  5:42 AM   Result Value Ref Range    POC Glucose 104 65 - 140 mg/dl   Fingerstick Glucose (POCT)    Collection Time: 07/04/24 11:35 AM   Result Value Ref Range    POC Glucose 109 65 - 140 mg/dl   Fingerstick Glucose (POCT)    Collection Time: 07/04/24  3:07 PM   Result Value Ref Range    POC Glucose 100 65 - 140 mg/dl   Fingerstick Glucose (POCT)    Collection Time: 07/04/24  8:45 PM   Result Value Ref Range    POC Glucose 87 65 - 140 mg/dl   Fingerstick Glucose (POCT)    Collection Time: 07/05/24  5:41 AM   Result Value Ref Range    POC Glucose 81 65 - 140 mg/dl   CBC and differential    Collection Time: 07/05/24  5:54 AM   Result Value Ref Range    WBC 9.45 4.31 - 10.16 Thousand/uL    RBC 4.42 3.81 - 5.12 Million/uL    Hemoglobin 12.6 11.5 - 15.4 g/dL    Hematocrit 40.7 34.8 - 46.1 %    MCV 92 82 - 98 fL    MCH 28.5 26.8 - 34.3 pg    MCHC 31.0 (L) 31.4 - 37.4 g/dL    RDW 16.9 (H) 11.6 - 15.1 %    MPV 9.9 8.9 - 12.7 fL    Platelets 303 149 - 390 Thousands/uL    nRBC 0 /100 WBCs    Segmented % 63 43 - 75 %    Immature Grans % 0 0 - 2 %    Lymphocytes % 23 14 - 44 %    Monocytes % 13 (H) 4 - 12 %    Eosinophils Relative 0 0 - 6 %    Basophils Relative 1 0 - 1 %    Absolute Neutrophils 5.95 1.85 - 7.62 Thousands/µL    Absolute Immature Grans 0.04 0.00 - 0.20 Thousand/uL    Absolute Lymphocytes 2.20 0.60 - 4.47 Thousands/µL    Absolute Monocytes 1.18 0.17 -  1.22 Thousand/µL    Eosinophils Absolute 0.00 0.00 - 0.61 Thousand/µL    Basophils Absolute 0.08 0.00 - 0.10 Thousands/µL   Comprehensive metabolic panel    Collection Time: 07/05/24  5:54 AM   Result Value Ref Range    Sodium 150 (H) 135 - 147 mmol/L    Potassium 3.9 3.5 - 5.3 mmol/L    Chloride 110 (H) 96 - 108 mmol/L    CO2 30 21 - 32 mmol/L    ANION GAP 10 4 - 13 mmol/L    BUN 36 (H) 5 - 25 mg/dL    Creatinine 0.92 0.60 - 1.30 mg/dL    Glucose 76 65 - 140 mg/dL    Calcium 9.5 8.4 - 10.2 mg/dL    AST 39 13 - 39 U/L    ALT 22 7 - 52 U/L    Alkaline Phosphatase 78 34 - 104 U/L    Total Protein 6.9 6.4 - 8.4 g/dL    Albumin 4.2 3.5 - 5.0 g/dL    Total Bilirubin 0.55 0.20 - 1.00 mg/dL    eGFR 69 ml/min/1.73sq m   Magnesium    Collection Time: 07/05/24  5:54 AM   Result Value Ref Range    Magnesium 2.3 1.9 - 2.7 mg/dL   Phosphorus    Collection Time: 07/05/24  5:54 AM   Result Value Ref Range    Phosphorus 4.3 2.7 - 4.5 mg/dL   CK    Collection Time: 07/05/24  5:54 AM   Result Value Ref Range    Total  (H) 26 - 192 U/L   Procalcitonin    Collection Time: 07/05/24  5:54 AM   Result Value Ref Range    Procalcitonin 0.10 <=0.25 ng/ml        This note was not shared with the patient due to reasonable likelihood of causing patient harm      Pushpa Fonseca DO  Psychiatry Residency, PGY-2  Department of Psychiatry and Behavioral Health  [facility]

## 2024-07-05 NOTE — QUICK NOTE
Notified by primary RN of decrease in patient's SpO2 to 86-88% on room air. SpO2 improved to 92-93% on 2 L NC. Patient was also moved to room 702 due to a substantial shower leak in her previous room 717. Of note, patient has been exhibiting a low-grade fever of 100.1-100.2F tonight, for which IV Tylenol was ordered and given, as patient was refusing all PO medications earlier in the night. Repeat portable CXR ordered at the time of communication from nursing.    Patient was also seen and examined at bedside. She is resting in bed in no acute distress, warm but not visibly diaphoretic. Nasal cannula in place. Bilateral breath sounds diminished (unchanged from prior exam per RN) with no adventitious sounds (wheezes, rales, rhonchi) present. No edema appreciated in all 4 extremities. Normal heart rate and rhythm. Normal pulses b/l radial and DP/PT.     Bedside STAT CXR was performed, independently interpreted by me via review of films in PACS, and formally read by radiology revealing no acute cardiopulmonary disease. No significant changes appreciated compared to previous CXR from 7/3/24.    Assessment/Plan:  Suspect that patient's transient hypoxemia which improved with NC O2 is possibly 2/2 known reactive airway disease in the setting of other acute illnesses i.e. NICHOLAS, rhabdomyolysis, and possible UTI versus hypoventilation due to chest wall rigidity with possible NMS  Patient is already on IV antibiotics - continue as ordered  Continue aspiration precautions, elevate HOB, and NPO  Supportive care, albuterol/DuoNeb as needed for wheezing, SOB  Will continue to monitor VS, labs, respiratory status  Procalcitonin added to AM labs to monitor severity of systemic infection

## 2024-07-05 NOTE — ASSESSMENT & PLAN NOTE
Stable, no signs of exacerbation  Continue home Arnuity and Albuterol PRN   Now requiring 2 L nasal cannula, however likely from underlying NMS versus CNS infection

## 2024-07-05 NOTE — DISCHARGE SUMMARY
[facility]  Discharge- Murdock ESPINOZA Nowak 1965, 59 y.o. female MRN: [id number]  Unit/Bed#: 7T [facility] 702-01 Encounter: [id number]  Primary Care Provider: Adriana Bradford MD   Date and time admitted to hospital: 7/3/2024  4:12 PM    Acute hypernatremia  Assessment & Plan  Patient noted to have hypernatremia overnight; current serum sodium 150  Calculated to have a 4 L free water deficit; will initiate D5W infusion  Repeat labs this evening and in the a.m.; adjust infusion rate as needed for sodium goal of 140    Abdominal distension  Assessment & Plan  Abdominal distention on exam, no signs of pain or discomfort with palpation, suspecting hernia   CT A/P unremarkable; history of ventral hernia  Bowel regimen   I+O, PVRs    Rhabdomyolysis  Assessment & Plan   likely due to NICHOLAS  Given 1 L of isolate  Remained stable, continue IV fluids    Abnormal urinalysis  Assessment & Plan  Urinalysis abnormal with mucus threads and WBCs  Follow-up urine culture  Okay to straight cath if needed for sample  Empirically start IV Rocephin    SIRS (systemic inflammatory response syndrome) (HCC)  Assessment & Plan  POA, as evidenced by tachycardia and leukocytosis  No source of infection but suspicious for UTI   CT C/A/P unremarkable   Empirically start IV Rocephin  Follow-up up blood and urine cultures, Pro-Yves and lactic acidosis  Trend fever curve and CBC with differential  7/4-update: White count trending down; procalcitonin essentially negative X2.  Will continue antibiotics, day 2/3.  Low threshold to continue antibiotics through third day pending neurological consult and clinical worsening.    Reactive airway disease  Assessment & Plan  Stable, no signs of exacerbation  Continue home Arnuity and Albuterol PRN   Now requiring 2 L nasal cannula, however likely from underlying NMS versus CNS infection    Tobacco abuse  Assessment & Plan  Patient unable to state whether she continues to  smoke  Consider nicotine patch if needed    Psychosis (HCC)  Assessment & Plan  Initially admitted to [place] inpatient behavioral health unit.  There was concern for atypical NMS given diaphoresis, tachycardia, episode of hypertension, and mild rigidity.  Her blood pressure and rigidity has improved.  Her CK is mildly elevated likely from rhabdomyolysis.    Currently being treated for NMS by psychiatric team  Unfortunately, patient has been making slow improvement, consult to neurology for additional insight  Continue one-to-one  Appreciate psychiatry input  Can return to behavioral health unit once medically stable    7/5-update: Patient has shown some mild improvement with Ativan and other supportive care for NMS.  Have asked neurology to evaluate; they have concerns about possible CNS infection.  At this point, neurology recommending urgent LP and MRI brain with and without contrast.  Additionally, will cover with empiric antibiotics/vancomycin, Rocephin, and acyclovir.  --- As [place] does not have interventional radiology or neurology in house, will require transfer.  Have discussed possible boomerang transfer with PACs, however LP's do not qualify.  Patient will require transfer of hospitals to [facility].  --- Patient will need lumbar puncture, procedure and labs have been ordered; please see order set for instructions on contacting patient's daughter for consent.  --- Patient will need urgent MRI brain with and without contrast; patient is obtunded and rigid, may need conscious sedation to achieve LP and acceptable MRI brain results; recommend both be obtained at the same time.  --- Continue Ativan for treatment of likely  NMS; discuss with psychiatry moving forward.  --- As per pharmacy, patient will require continuous IV fluid hydration while on acyclovir to avoid renal failure.  Monitor BMP daily and transition from D5W to saline or isolate once sodium is stable.    Type 2 diabetes  "mellitus (HCC)  Assessment & Plan  Lab Results   Component Value Date    HGBA1C 6.4 (H) 05/03/2024       Recent Labs     07/04/24  1135 07/04/24  1507 07/04/24  2045 07/05/24  0541   POCGLU 109 100 87 81         Blood Sugar Average: Last 72 hrs:  (P) [id number] 6.4  Hold home Metformin  Monitor ACCU checks AC + HS with lispro insulin sliding scale coverage     * NICHOLAS (acute kidney injury) (Formerly [facility])  Assessment & Plan  POA, as evidenced by creatinine 1.8 with a baseline near 0.9, likely prerenal due to dehydration  Given 1 L of Isolyte followed by maintenance fluids  Cr today: 1.26  Continue gentle IVF given poor po intake   Limit nephrotoxic agents and avoid hypotension  Monitor I&O and PVRs  Creatinine and CK in the morning  7/4-creatinine improved and currently 1.26; NICHOLAS resolved.  Will continue gentle IV hydration and monitor CK on morning labs.              Medical Problems       Resolved Problems  Date Reviewed: 7/4/2024   None         Admission Date:   Admission Orders (From admission, onward)       Ordered        07/03/24 1613  INPATIENT ADMISSION  Once                            Admitting Diagnosis: NICHOLAS (acute kidney injury) (Formerly [facility]) [N17.9]    HPI: \"Meli Nowak is a 59 y.o. female with a PMH of with a PMH including T2DM, schizophrenia, HLD, tobacco use, endometrial cancer s/p surgical bulking, PTSD, morbid obesity who presents with NICHOLAS. Initially, patient presented to [facility] on 7/1/2024 via police who found her sitting on a bench paranoid and not making sense. Patient was a poor historian. Patient's medical workup was negative. She met with Crisis however, was unable to sign a 201 due to lacking capacity. A 302 was upheld, and patient was transferred to [facility] for inpatient behavioral health unit. Patient appeared diaphoretic and tachycardic during medical consultation on behavioral health unit. Lab work demonstrated an acute kidney injury and leukocytosis. Her " "urinalysis was abnormal with mucus threads and WBCs. Her abdomen was distended.  She was given 1 liter of Isolyte. She remained withdrawn with limited conversation and unable to make her needs known.  She did not indicate any signs of pain on palpation. A CT of the chest abdomen and pelvis was obtained without contrast which was unremarkable.  Patient will be admitted to the medical unit for further evaluation and treatment of NICHOLAS and SIRS.\"    Procedures Performed: No orders of the defined types were placed in this encounter.      Summary of Hospital Course: Patient noted to become obtunded while in the behavioral health unit; transferred to [facility] floor for further treatment.  Initially, patient did not meet criteria for treatment of presumed NMS, but now does and was started on Ativan every 4 treatments by behavioral health team.  Thus far, procalcitonin and white count negative, but was not covered empirically with Rocephin for suspicious UA findings.  CT head negative X2, CT chest abdomen pelvis also with no acute findings.  Despite 2 days of IV fluids, antibiotics, and Ativan, patient has shown only mild improvement.  Remains rigid with tremors, altered with apparent word finding activity and dysarthria.  Strabismus noted on left orbit, unclear if chronic or acute.  Able to follow some commands (wiggle your toes) and able to track examiner movements.  Otherwise, unable to follow commands.  NICHOLAS has cleared; mild rhabdo and will continue IV fluids.  Antipsychotics have been held and psychiatry following along.    Patient was evaluated by neurology today, would like to rule out any possible CNS infection.  Requesting LP and urgent MRI brain with contrast.  This will require transfer to another [facility] facility with IR capabilities.  See plan under psychosis above, but ultimately have empirically covered patient with vancomycin, Rocephin, and acyclovir.  N.p.o. except for small sips of clear liquids and " medications.    Patient has very involved  involved in her care, Conchita Titus [phone] who can provide most details on her previous activities of daily living.  Consent for procedures should be obtained by Yulia Jarvis (biological daughter); phone number in demographics.    Significant Findings, Care, Treatment and Services Provided:   Psychosis  NICHOLAS  Acute respiratory failure  Hyponatremia    Complications: Possible NMS versus CNS infection    Condition at Discharge: stable         Discharge instructions/Information to patient and family:   See after visit summary for information provided to patient and family.      Provisions for Follow-Up Care:  See after visit summary for information related to follow-up care and any pertinent home health orders.      PCP: Adriana Bradford MD    Disposition:  Transfer to higher level of care    Planned Readmission: Yes; once received by another [facility]    Discharge Statement   I spent 75 minutes discharging the patient. This time was spent on the day of discharge. I had direct contact with the patient on the day of discharge. Additional documentation is required if more than 30 minutes were spent on discharge.     Discharge Medications:  See after visit summary for reconciled discharge medications provided to patient and family.

## 2024-07-05 NOTE — CASE MANAGEMENT
Case Management Assessment & Discharge Planning Note    Patient name Meli Nowak  Location 7T [facility] 702/7T [facility] 702-01 MRN [id number]  : 1965 Date 2024       Current Admission Date: 7/3/2024  Current Admission Diagnosis:NICHOLAS (acute kidney injury) (HCC)   Patient Active Problem List    Diagnosis Date Noted Date Diagnosed    Acute hypernatremia 2024     Medical clearance for psychiatric admission 2024     Type 2 diabetes mellitus (HCC) 2024     Obesity 2024     Psychosis (HCC) 2024     Tobacco abuse 2024     Hyperlipidemia 2024     Reactive airway disease 2024     NICHOLAS (acute kidney injury) (HCC) 2024     SIRS (systemic inflammatory response syndrome) (HCC) 2024     Abnormal urinalysis 2024     Rhabdomyolysis 2024     Abdominal distension 2024       LOS (days): 2  Geometric Mean LOS (GMLOS) (days):   Days to GMLOS:     OBJECTIVE:    Risk of Unplanned Readmission Score: 15.28        Current admission status: Inpatient      Preferred Pharmacy:   UNKNOWN - FOLLOW UP PRIOR TO DISCHARGE TO E-PRESCRIBE  No address on file      Primary Care Provider: Adriana Bradford MD    Primary Insurance: MEDICARE  Secondary Insurance:     ASSESSMENT:  Active Health Care Proxies    There are no active Health Care Proxies on file.       Advance Directives  Does patient have a Health Care POA?: No  Was patient offered paperwork?: Yes  Does patient currently have a Health Care decision maker?: No  Does patient have Advance Directives?: No  Was patient offered paperwork?: Yes    Patient Information  Mental Status: Other (Comment) (non-verbal not answering questions)  During Assessment patient was accompanied by: Other-Comment ([facility] Conchitamercedes Hall)  Assessment information provided by:: Other - please comment ([facility] Conchita)  Support Systems: Friend, Other (Comment) ([facility] Conchita)  County of Residence: [place]  What [facility] do you live in?: [place]  Home entry  access options. Select all that apply.: Elevator  Type of Current Residence: Apartment  Floor Level: 8  Upon entering residence, is there a bedroom on the main floor (no further steps)?: Yes  Upon entering residence, is there a bathroom on the main floor (no further steps)?: Yes  Living Arrangements: Lives Alone  Is patient a ?: No    Activities of Daily Living Prior to Admission  Functional Status: Independent  Completes ADLs independently?: Yes  Ambulates independently?: Yes  Does patient use assisted devices?: No  Does patient currently own DME?: No  Does patient have a history of Outpatient Therapy (PT/OT)?: No  Does the patient have a history of Short-Term Rehab?: No  Does patient have a history of HHC?: No  Does patient currently have HHC?: No      Patient Information Continued  Income Source: SSI/SSD  Does patient have prescription coverage?: Yes  Does patient receive dialysis treatments?: No  Does patient have a history of substance abuse?: No  Current Status:: 303 (303 [facility] EXP 7/25/24)  Does patient have a history of Mental Health Diagnosis?: Yes (Psychosis)  Is patient receiving treatment for mental health?: Yes  Has patient received inpatient treatment related to mental health in the last 2 years?: Yes (Was at [facility] prior to transfer)      Means of Transportation  Means of Transport to Appts:: Friends      Social Determinants of Health (SDOH)      Flowsheet Row Most Recent Value   Housing Stability    In the last 12 months, was there a time when you were not able to pay the mortgage or rent on time? N   In the past 12 months, how many times have you moved where you were living? 0   At any time in the past 12 months, were you homeless or living in a shelter (including now)? N   Transportation Needs    In the past 12 months, has lack of transportation kept you from medical appointments or from getting medications? no   In the past 12 months, has lack of transportation kept you from meetings,  work, or from getting things needed for daily living? No   Food Insecurity    Within the past 12 months, you worried that your food would run out before you got the money to buy more. Never true   Within the past 12 months, the food you bought just didn't last and you didn't have money to get more. Never true   Utilities    In the past 12 months has the electric, gas, oil, or water company threatened to shut off services in your home? No          DISCHARGE DETAILS:    Discharge planning discussed with:: Patient and [facility] Conchita Hall  [place] of Choice: Yes     CM contacted family/caregiver?: Yes  Were Treatment Team discharge recommendations reviewed with patient/caregiver?: Yes  Did patient/caregiver verbalize understanding of patient care needs?: Yes       Contacts  Patient Contacts: Conchita Titus (Other)  [phone] (Home Phone)  Relationship to Patient:: Other (Comment) (ICM)  Contact Method: In Person  Phone Number: Conchita Titus (Other)  [phone] (Home Phone)  Reason/Outcome: Emergency Contact    Requested Home Health Care         Is the patient interested in HHC at discharge?: No      Other Referral/Resources/Interventions Provided:  Interventions: Transportation, Other (Specify) (Transfer to higher level of care)       Transport at Discharge : BLS Ambulance       Additional Comments: Met with patient and [facility] Conchita Hall.  Patient eyes open but non-verbal and not able to answer questions.  Information obtained from Conchita Hall who is [facility].  She has been CM x 12 years.  Patient lives alone and is Independent.  Patient has a daughter Chari Nowak [phone].  Spoke with dtr she has not seen patient in 2 years.  Dtr was given up at birth and was adopted by a family.  Patient will need transfer to higher level of care.  Medical necessity in folder.  Orignal 303 in folder.  Discussed with nurse Alicia to send orginal 303 with patient.  Conchita Hall given  patient's apartment keys so she can go care for patient's cat.  CM department following thru discharge

## 2024-07-05 NOTE — PLAN OF CARE
Problem: Prexisting or High Potential for Compromised Skin Integrity  Goal: Skin integrity is maintained or improved  Description: INTERVENTIONS:  - Identify patients at risk for skin breakdown  - Assess and monitor skin integrity  - Assess and monitor nutrition and hydration status  - Monitor labs   - Assess for incontinence   - Turn and reposition patient  - Assist with mobility/ambulation  - Relieve pressure over bony prominences  - Avoid friction and shearing  - Provide appropriate hygiene as needed including keeping skin clean and dry  - Evaluate need for skin moisturizer/barrier cream  - Collaborate with interdisciplinary team   - Patient/family teaching  - Consider wound care consult   Outcome: Progressing     Problem: INFECTION - ADULT  Goal: Absence or prevention of progression during hospitalization  Description: INTERVENTIONS:  - Assess and monitor for signs and symptoms of infection  - Monitor lab/diagnostic results  - Monitor all insertion sites, i.e. indwelling lines, tubes, and drains  - Monitor endotracheal if appropriate and nasal secretions for changes in amount and color  - [place] appropriate cooling/warming therapies per order  - Administer medications as ordered  - Instruct and encourage patient and family to use good hand hygiene technique  - Identify and instruct in appropriate isolation precautions for identified infection/condition  Outcome: Progressing     Problem: Knowledge Deficit  Goal: Patient/family/caregiver demonstrates understanding of disease process, treatment plan, medications, and discharge instructions  Description: Complete learning assessment and assess knowledge base.  Interventions:  - Provide teaching at level of understanding  - Provide teaching via preferred learning methods  Outcome: Progressing     Problem: DISCHARGE PLANNING  Goal: Discharge to home or other facility with appropriate resources  Description: INTERVENTIONS:  - Identify barriers to discharge w/patient  and caregiver  - Arrange for needed discharge resources and transportation as appropriate  - Identify discharge learning needs (meds, wound care, etc.)  - Arrange for interpretive services to assist at discharge as needed  - Refer to Case Management Department for coordinating discharge planning if the patient needs post-hospital services based on physician/advanced practitioner order or complex needs related to functional status, cognitive ability, or social support system  Outcome: Progressing

## 2024-07-05 NOTE — ASSESSMENT & PLAN NOTE
Initially admitted to [place] inpatient behavioral health unit.  There was concern for atypical NMS given diaphoresis, tachycardia, episode of hypertension, and mild rigidity.  Her blood pressure and rigidity has improved.  Her CK is mildly elevated likely from rhabdomyolysis.    Currently being treated for NMS by psychiatric team  Unfortunately, patient has been making slow improvement, consult to neurology for additional insight  Continue one-to-one  Appreciate psychiatry input  Can return to behavioral health unit once medically stable    7/5-update: Patient has shown some mild improvement with Ativan and other supportive care for NMS.  Have asked neurology to evaluate; they have concerns about possible CNS infection.  At this point, neurology recommending urgent LP and MRI brain with and without contrast.  Additionally, will cover with empiric antibiotics/vancomycin, Rocephin, and acyclovir.  --- As [facility] does not have interventional radiology or neurology in house, will require transfer.  Have discussed possible boomerang transfer with PACs, however LP's do not qualify.  Patient will require transfer of hospitals to [facility].  --- Patient will need lumbar puncture, procedure and labs have been ordered; please see order set for instructions on contacting patient's daughter for consent.  --- Patient will need urgent MRI brain with and without contrast; patient is obtunded and rigid, may need conscious sedation to achieve LP and acceptable MRI brain results; recommend both be obtained at the same time.  --- Continue Ativan for treatment of likely  NMS; discuss with psychiatry moving forward.  --- As per pharmacy, patient will require continuous IV fluid hydration while on acyclovir to avoid renal failure.  Monitor BMP daily and transition from D5W to saline or isolate once sodium is stable.

## 2024-07-05 NOTE — PLAN OF CARE
Problem: Prexisting or High Potential for Compromised Skin Integrity  Goal: Skin integrity is maintained or improved  Description: INTERVENTIONS:  - Identify patients at risk for skin breakdown  - Assess and monitor skin integrity  - Assess and monitor nutrition and hydration status  - Monitor labs   - Assess for incontinence   - Turn and reposition patient  - Assist with mobility/ambulation  - Relieve pressure over bony prominences  - Avoid friction and shearing  - Provide appropriate hygiene as needed including keeping skin clean and dry  - Evaluate need for skin moisturizer/barrier cream  - Collaborate with interdisciplinary team   - Patient/family teaching  - Consider wound care consult   Outcome: Progressing     Problem: PAIN - ADULT  Goal: Verbalizes/displays adequate comfort level or baseline comfort level  Description: Interventions:  - Encourage patient to monitor pain and request assistance  - Assess pain using appropriate pain scale  - Administer analgesics based on type and severity of pain and evaluate response  - Implement non-pharmacological measures as appropriate and evaluate response  - Consider cultural and social influences on pain and pain management  - Notify physician/advanced practitioner if interventions unsuccessful or patient reports new pain  Outcome: Progressing     Problem: INFECTION - ADULT  Goal: Absence or prevention of progression during hospitalization  Description: INTERVENTIONS:  - Assess and monitor for signs and symptoms of infection  - Monitor lab/diagnostic results  - Monitor all insertion sites, i.e. indwelling lines, tubes, and drains  - Monitor endotracheal if appropriate and nasal secretions for changes in amount and color  - [place] appropriate cooling/warming therapies per order  - Administer medications as ordered  - Instruct and encourage patient and family to use good hand hygiene technique  - Identify and instruct in appropriate isolation precautions for  identified infection/condition  Outcome: Progressing  Goal: Absence of fever/infection during neutropenic period  Description: INTERVENTIONS:  - Monitor WBC    Outcome: Progressing     Problem: SAFETY ADULT  Goal: Patient will remain free of falls  Description: INTERVENTIONS:  - Educate patient/family on patient safety including physical limitations  - Instruct patient to call for assistance with activity   - Consult OT/PT to assist with strengthening/mobility   - Keep Call bell within reach  - Keep bed low and locked with side rails adjusted as appropriate  - Keep care items and personal belongings within reach  - Initiate and maintain comfort rounds  - Make Fall Risk Sign visible to staff  - Offer Toileting every  Hours, in advance of need  - Initiate/Maintain alarm  - Obtain necessary fall risk management equipment:   - Apply yellow socks and bracelet for high fall risk patients  - Consider moving patient to room near nurses station  Outcome: Progressing  Goal: Maintain or return to baseline ADL function  Description: INTERVENTIONS:  -  Assess patient's ability to carry out ADLs; assess patient's baseline for ADL function and identify physical deficits which impact ability to perform ADLs (bathing, care of mouth/teeth, toileting, grooming, dressing, etc.)  - Assess/evaluate cause of self-care deficits   - Assess range of motion  - Assess patient's mobility; develop plan if impaired  - Assess patient's need for assistive devices and provide as appropriate  - Encourage maximum independence but intervene and supervise when necessary  - Involve family in performance of ADLs  - Assess for home care needs following discharge   - Consider OT consult to assist with ADL evaluation and planning for discharge  - Provide patient education as appropriate  Outcome: Progressing  Goal: Maintains/Returns to pre admission functional level  Description: INTERVENTIONS:  - Perform AM-PAC 6 Click Basic Mobility/ Daily Activity  assessment daily.  - Set and communicate daily mobility goal to care team and patient/family/caregiver.   - Collaborate with rehabilitation services on mobility goals if consulted  - Perform Range of Motion  times a day.  - Reposition patient every  hours.  - Dangle patient  times a day  - Stand patient  times a day  - Ambulate patient  times a day  - Out of bed to chair  times a day   - Out of bed for meals  times a day  - Out of bed for toileting  - Record patient progress and toleration of activity level   Outcome: Progressing     Problem: DISCHARGE PLANNING  Goal: Discharge to home or other facility with appropriate resources  Description: INTERVENTIONS:  - Identify barriers to discharge w/patient and caregiver  - Arrange for needed discharge resources and transportation as appropriate  - Identify discharge learning needs (meds, wound care, etc.)  - Arrange for interpretive services to assist at discharge as needed  - Refer to Case Management Department for coordinating discharge planning if the patient needs post-hospital services based on physician/advanced practitioner order or complex needs related to functional status, cognitive ability, or social support system  Outcome: Progressing     Problem: Knowledge Deficit  Goal: Patient/family/caregiver demonstrates understanding of disease process, treatment plan, medications, and discharge instructions  Description: Complete learning assessment and assess knowledge base.  Interventions:  - Provide teaching at level of understanding  - Provide teaching via preferred learning methods  Outcome: Progressing     Problem: Nutrition/Hydration-ADULT  Goal: Nutrient/Hydration intake appropriate for improving, restoring or maintaining nutritional needs  Description: Monitor and assess patient's nutrition/hydration status for malnutrition. Collaborate with interdisciplinary team and initiate plan and interventions as ordered.  Monitor patient's weight and dietary intake as  ordered or per policy. Utilize nutrition screening tool and intervene as necessary. Determine patient's food preferences and provide high-protein, high-caloric foods as appropriate.     INTERVENTIONS:  - Monitor oral intake, urinary output, labs, and treatment plans  - Assess nutrition and hydration status and recommend course of action  - Evaluate amount of meals eaten  - Assist patient with eating if necessary   - Allow adequate time for meals  - Recommend/ encourage appropriate diets, oral nutritional supplements, and vitamin/mineral supplements  - Order, calculate, and assess calorie counts as needed  - Recommend, monitor, and adjust tube feedings based on assessed needs  - Assess need for intravenous fluids  - Provide  nutrition/hydration education as appropriate  - Include patient/family/caregiver in decisions related to nutrition  Outcome: Progressing     Problem: MOBILITY - ADULT  Goal: Maintain or return to baseline ADL function  Description: INTERVENTIONS:  -  Assess patient's ability to carry out ADLs; assess patient's baseline for ADL function and identify physical deficits which impact ability to perform ADLs (bathing, care of mouth/teeth, toileting, grooming, dressing, etc.)  - Assess/evaluate cause of self-care deficits   - Assess range of motion  - Assess patient's mobility; develop plan if impaired  - Assess patient's need for assistive devices and provide as appropriate  - Encourage maximum independence but intervene and supervise when necessary  - Involve family in performance of ADLs  - Assess for home care needs following discharge   - Consider OT consult to assist with ADL evaluation and planning for discharge  - Provide patient education as appropriate  Outcome: Progressing  Goal: Maintains/Returns to pre admission functional level  Description: INTERVENTIONS:  - Perform AM-PAC 6 Click Basic Mobility/ Daily Activity assessment daily.  - Set and communicate daily mobility goal to care team and  patient/family/caregiver.   - Collaborate with rehabilitation services on mobility goals if consulted  - Perform Range of Motion  times a day.  - Reposition patient every  hours.  - Dangle patient  times a day  - Stand patient  times a day  - Ambulate patient  times a day  - Out of bed to chair  times a day   - Out of bed for meals  times a day  - Out of bed for toileting  - Record patient progress and toleration of activity level   Outcome: Progressing

## 2024-07-05 NOTE — ASSESSMENT & PLAN NOTE
Abdominal distention on exam, no signs of pain or discomfort with palpation, suspecting hernia   CT A/P unremarkable; history of ventral hernia  Bowel regimen   I+O, PVRs

## 2024-07-05 NOTE — ASSESSMENT & PLAN NOTE
Lab Results   Component Value Date    HGBA1C 6.4 (H) 05/03/2024       Recent Labs     07/04/24  1135 07/04/24  1507 07/04/24 2045 07/05/24  0541   POCGLU 109 100 87 81         Blood Sugar Average: Last 72 hrs:  (P) [id number] 6.4  Hold home Metformin  Monitor ACCU checks AC + HS with lispro insulin sliding scale coverage

## 2024-07-05 NOTE — ASSESSMENT & PLAN NOTE
Patient noted to have hypernatremia overnight; current serum sodium 150  Calculated to have a 4 L free water deficit; will initiate D5W infusion  Repeat labs this evening and in the a.m.; adjust infusion rate as needed for sodium goal of 140

## 2024-07-06 LAB
ALBUMIN SERPL BCG-MCNC: 4.1 G/DL (ref 3.5–5)
ALP SERPL-CCNC: 81 U/L (ref 34–104)
ALT SERPL W P-5'-P-CCNC: 23 U/L (ref 7–52)
ANION GAP SERPL CALCULATED.3IONS-SCNC: 5 MMOL/L (ref 4–13)
APPEARANCE CSF: CLEAR
AST SERPL W P-5'-P-CCNC: 29 U/L (ref 13–39)
BASOPHILS # BLD AUTO: 0.1 THOUSANDS/ÂΜL (ref 0–0.1)
BASOPHILS NFR BLD AUTO: 1 % (ref 0–1)
BILIRUB SERPL-MCNC: 0.44 MG/DL (ref 0.2–1)
BUN SERPL-MCNC: 31 MG/DL (ref 5–25)
CALCIUM SERPL-MCNC: 8.8 MG/DL (ref 8.4–10.2)
CHLORIDE SERPL-SCNC: 112 MMOL/L (ref 96–108)
CK SERPL-CCNC: 317 U/L (ref 26–192)
CO2 SERPL-SCNC: 30 MMOL/L (ref 21–32)
CREAT SERPL-MCNC: 0.87 MG/DL (ref 0.6–1.3)
EOSINOPHIL # BLD AUTO: 0 THOUSAND/ÂΜL (ref 0–0.61)
EOSINOPHIL NFR BLD AUTO: 0 % (ref 0–6)
ERYTHROCYTE [DISTWIDTH] IN BLOOD BY AUTOMATED COUNT: 16.9 % (ref 11.6–15.1)
GFR SERPL CREATININE-BSD FRML MDRD: 74 ML/MIN/1.73SQ M
GLUCOSE CSF-MCNC: 86 MG/DL (ref 40–70)
GLUCOSE SERPL-MCNC: 104 MG/DL (ref 65–140)
GLUCOSE SERPL-MCNC: 131 MG/DL (ref 65–140)
GLUCOSE SERPL-MCNC: 97 MG/DL (ref 65–140)
GLUCOSE SERPL-MCNC: 98 MG/DL (ref 65–140)
GRAM STN SPEC: NORMAL
GRAM STN SPEC: NORMAL
HCT VFR BLD AUTO: 42.9 % (ref 34.8–46.1)
HGB BLD-MCNC: 12.6 G/DL (ref 11.5–15.4)
HISTIOCYTES NFR CSF: 1 %
IMM GRANULOCYTES # BLD AUTO: 0.07 THOUSAND/UL (ref 0–0.2)
IMM GRANULOCYTES NFR BLD AUTO: 1 % (ref 0–2)
LYMPHOCYTES # BLD AUTO: 2.1 THOUSANDS/ÂΜL (ref 0.6–4.47)
LYMPHOCYTES NFR BLD AUTO: 22 % (ref 14–44)
LYMPHOCYTES NFR CSF MANUAL: 85 %
MAGNESIUM SERPL-MCNC: 2.1 MG/DL (ref 1.9–2.7)
MCH RBC QN AUTO: 28.6 PG (ref 26.8–34.3)
MCHC RBC AUTO-ENTMCNC: 29.4 G/DL (ref 31.4–37.4)
MCV RBC AUTO: 98 FL (ref 82–98)
MONOCYTES # BLD AUTO: 1.14 THOUSAND/ÂΜL (ref 0.17–1.22)
MONOCYTES NFR BLD AUTO: 12 % (ref 4–12)
MONOS+MACROS CSF MANUAL: 8 %
NEUTROPHILS # BLD AUTO: 6.12 THOUSANDS/ÂΜL (ref 1.85–7.62)
NEUTROPHILS NFR CSF MANUAL: 6 %
NEUTS SEG NFR BLD AUTO: 64 % (ref 43–75)
NRBC BLD AUTO-RTO: 0 /100 WBCS
PHOSPHATE SERPL-MCNC: 4.4 MG/DL (ref 2.7–4.5)
PLATELET # BLD AUTO: 266 THOUSANDS/UL (ref 149–390)
PMV BLD AUTO: 9.1 FL (ref 8.9–12.7)
POTASSIUM SERPL-SCNC: 3.7 MMOL/L (ref 3.5–5.3)
PROT CSF-MCNC: 69 MG/DL (ref 15–45)
PROT SERPL-MCNC: 6.7 G/DL (ref 6.4–8.4)
RBC # BLD AUTO: 4.4 MILLION/UL (ref 3.81–5.12)
SODIUM SERPL-SCNC: 147 MMOL/L (ref 135–147)
TOTAL CELLS COUNTED BLD: NO
TOTAL CELLS COUNTED SPEC: 100
TUBE # CSF: 4
VANCOMYCIN SERPL-MCNC: 14.9 UG/ML (ref 10–20)
WBC # BLD AUTO: 9.53 THOUSAND/UL (ref 4.31–10.16)
WBC # CSF AUTO: 7 /UL (ref 0–5)

## 2024-07-06 PROCEDURE — 83735 ASSAY OF MAGNESIUM: CPT | Performed by: NURSE PRACTITIONER

## 2024-07-06 PROCEDURE — 82945 GLUCOSE OTHER FLUID: CPT | Performed by: INTERNAL MEDICINE

## 2024-07-06 PROCEDURE — 84100 ASSAY OF PHOSPHORUS: CPT | Performed by: NURSE PRACTITIONER

## 2024-07-06 PROCEDURE — 99233 SBSQ HOSP IP/OBS HIGH 50: CPT | Performed by: INTERNAL MEDICINE

## 2024-07-06 PROCEDURE — 87483 CNS DNA AMP PROBE TYPE 12-25: CPT | Performed by: INTERNAL MEDICINE

## 2024-07-06 PROCEDURE — 80053 COMPREHEN METABOLIC PANEL: CPT | Performed by: NURSE PRACTITIONER

## 2024-07-06 PROCEDURE — 99232 SBSQ HOSP IP/OBS MODERATE 35: CPT | Performed by: PSYCHIATRY & NEUROLOGY

## 2024-07-06 PROCEDURE — NC001 PR NO CHARGE: Performed by: PSYCHIATRY & NEUROLOGY

## 2024-07-06 PROCEDURE — 86618 LYME DISEASE ANTIBODY: CPT | Performed by: INTERNAL MEDICINE

## 2024-07-06 PROCEDURE — 86051 AQUAPORIN-4 ANTB ELISA: CPT | Performed by: INTERNAL MEDICINE

## 2024-07-06 PROCEDURE — 82948 REAGENT STRIP/BLOOD GLUCOSE: CPT

## 2024-07-06 PROCEDURE — 80202 ASSAY OF VANCOMYCIN: CPT | Performed by: INTERNAL MEDICINE

## 2024-07-06 PROCEDURE — 82550 ASSAY OF CK (CPK): CPT | Performed by: NURSE PRACTITIONER

## 2024-07-06 PROCEDURE — 85025 COMPLETE CBC W/AUTO DIFF WBC: CPT | Performed by: NURSE PRACTITIONER

## 2024-07-06 PROCEDURE — 89051 BODY FLUID CELL COUNT: CPT | Performed by: INTERNAL MEDICINE

## 2024-07-06 PROCEDURE — 84157 ASSAY OF PROTEIN OTHER: CPT | Performed by: INTERNAL MEDICINE

## 2024-07-06 PROCEDURE — 86255 FLUORESCENT ANTIBODY SCREEN: CPT | Performed by: INTERNAL MEDICINE

## 2024-07-06 PROCEDURE — 86341 ISLET CELL ANTIBODY: CPT | Performed by: INTERNAL MEDICINE

## 2024-07-06 RX ORDER — ENOXAPARIN SODIUM 100 MG/ML
40 INJECTION SUBCUTANEOUS
Status: DISCONTINUED | OUTPATIENT
Start: 2024-07-07 | End: 2024-07-11 | Stop reason: HOSPADM

## 2024-07-06 RX ORDER — INSULIN LISPRO 100 [IU]/ML
1-5 INJECTION, SOLUTION INTRAVENOUS; SUBCUTANEOUS EVERY 6 HOURS
Status: DISCONTINUED | OUTPATIENT
Start: 2024-07-06 | End: 2024-07-11 | Stop reason: HOSPADM

## 2024-07-06 RX ORDER — DEXTROSE MONOHYDRATE 50 MG/ML
83 INJECTION, SOLUTION INTRAVENOUS CONTINUOUS
Status: DISPENSED | OUTPATIENT
Start: 2024-07-06 | End: 2024-07-07

## 2024-07-06 RX ORDER — LIDOCAINE HYDROCHLORIDE AND EPINEPHRINE 10; 10 MG/ML; UG/ML
20 INJECTION, SOLUTION INFILTRATION; PERINEURAL ONCE
Status: COMPLETED | OUTPATIENT
Start: 2024-07-06 | End: 2024-07-06

## 2024-07-06 RX ORDER — VANCOMYCIN HYDROCHLORIDE 1 G/200ML
1000 INJECTION, SOLUTION INTRAVENOUS EVERY 12 HOURS
Status: DISCONTINUED | OUTPATIENT
Start: 2024-07-06 | End: 2024-07-06

## 2024-07-06 RX ADMIN — MELATONIN 3 MG: 3 TAB ORAL at 22:04

## 2024-07-06 RX ADMIN — DEXTROSE 2000 MG: 50 INJECTION, SOLUTION INTRAVENOUS at 16:51

## 2024-07-06 RX ADMIN — VANCOMYCIN HYDROCHLORIDE 750 MG: 750 INJECTION, SOLUTION INTRAVENOUS at 06:17

## 2024-07-06 RX ADMIN — ACYCLOVIR SODIUM 825 MG: 50 INJECTION, SOLUTION INTRAVENOUS at 12:38

## 2024-07-06 RX ADMIN — LORAZEPAM 2 MG: 2 INJECTION INTRAMUSCULAR; INTRAVENOUS at 20:40

## 2024-07-06 RX ADMIN — ACYCLOVIR SODIUM 825 MG: 50 INJECTION, SOLUTION INTRAVENOUS at 22:04

## 2024-07-06 RX ADMIN — VANCOMYCIN HYDROCHLORIDE 1000 MG: 1 INJECTION, SOLUTION INTRAVENOUS at 15:00

## 2024-07-06 RX ADMIN — DEXTROSE 83 ML/HR: 5 SOLUTION INTRAVENOUS at 16:51

## 2024-07-06 RX ADMIN — LIDOCAINE HYDROCHLORIDE,EPINEPHRINE BITARTRATE 20 ML: 10; .01 INJECTION, SOLUTION INFILTRATION; PERINEURAL at 16:23

## 2024-07-06 RX ADMIN — SENNOSIDES 17.2 MG: 8.6 TABLET, FILM COATED ORAL at 22:04

## 2024-07-06 RX ADMIN — LORAZEPAM 2 MG: 2 INJECTION INTRAMUSCULAR; INTRAVENOUS at 12:39

## 2024-07-06 RX ADMIN — LORAZEPAM 2 MG: 2 INJECTION INTRAMUSCULAR; INTRAVENOUS at 16:00

## 2024-07-06 RX ADMIN — LORAZEPAM 2 MG: 2 INJECTION INTRAMUSCULAR; INTRAVENOUS at 00:08

## 2024-07-06 RX ADMIN — LORAZEPAM 2 MG: 2 INJECTION INTRAMUSCULAR; INTRAVENOUS at 08:48

## 2024-07-06 RX ADMIN — ACYCLOVIR SODIUM 825 MG: 50 INJECTION, SOLUTION INTRAVENOUS at 05:14

## 2024-07-06 NOTE — ASSESSMENT & PLAN NOTE
"On admission at [facility] she met SIRS criteria with leukocytosis and tachycardia  CXR and CT CAP unremarkable  She is being treated with ceftriaxone, acyclovir and vancomycin.  Will continue  Plan for LP and MRI to rule out encephalitis.  See above plan, \"Psychosis\"  Follow urine and blood cultures  "

## 2024-07-06 NOTE — PROGRESS NOTES
Vancomycin Monitoring    Demographics    Meli A Bolles    Assessment    Vancomycin has been d/c'd.   Plan    Consult closed, level cancelled.     Kameron Saenz, PharmD

## 2024-07-06 NOTE — ASSESSMENT & PLAN NOTE
Meli Nowak is a 59 y.o. female with schizoaffective disorder, HLD, DM type II, tobacco abuse who was initially admitted to [facility] on 7/1/2024 as a 302 for worsening signs and symptoms of psychosis including paranoia and persecutory delusions.  During this admission patient received olanzapine and haloperidol.      It was later noted that patient was rigid and tremulous. She was also found to have mild leukocytosis, elevated creatinine, elevated CRP, and elevated total CK.  Neuroleptics were discontinued and patient was given IVF and Ativan. Neurology was consulted on 7/5/2024 as patient remained confused.    With concern for neuroleptic malignant syndrome, patient received bromocriptine and recommendations were made to transfer to [facility] for LP to rule out meningitis/encephalitis, as well as MRI brain.    Workup:  - CT head 7/1/2024: Unremarkable for acute intracranial abnormalities  - Labwork:  RDU on presentation positive for benzodiazepine  UA: Negative nitrite, 4-10 WBC, no bacteria seen  Urine culture [id number] CFU/mL mixed contaminants x 4  Labs on presentation WNL: Magnesium, coma panel, TSH  Creatinine elevated 7/3, 1.88  CRP elevated 7/3, 4.1  Leukocytosis noted on 7/3, 14.89  Total CK elevated 7/3, 518  Labs WNL: Ammonia, blood cultures x 2, vitamin D, vitamin B12, folate, procalcitonin    Plan:  - Plan for LP  - MRI brain pending  - Echo pending  - If CSF and MRI brain unremarkable, recommend continuing treatment of underlying psychiatric illness  - Okay to continue acyclovir at this time  - Ativan 2 mg 4 times daily ordered per psych recommendations for possible catatonia/neuroleptic malignant syndrome  - Medical management and supportive care per primary team, notify with changes

## 2024-07-06 NOTE — ASSESSMENT & PLAN NOTE
Recent Labs     07/05/24  0554 07/05/24  2240 07/06/24  0610   CREATININE 0.92 0.87 0.87   Improved back to baseline

## 2024-07-06 NOTE — H&P
[facility]  H&P  Name: Meli Nowak 59 y.o. female I MRN: [id number]  Unit/Bed#: [place] [address] -01 I Date of Admission: 7/5/2024   Date of Service: 7/6/2024  Hospital Day: 1      Assessment & Plan   * Psychosis (HCC)  Assessment & Plan  Patient is a 302 at [facility] for acute psychotic episode.  She was originally brought to [facility] after [employer] found her confused on a park bench.  She met with crisis and lacked capacity to sign a 201. A 302 was upheld and she was transferred to [facility].  During admission, she was noted semicatatonic and somewhat rigid concerning for NMS versus catatonia versus cholinergic rebound syndrome.  She met SIRS criteria with tachycardia and leukocytosis.  Transferred to [place] for neurologic assessment, LP and MRI to rule encephalitis/meningitis    CTH unremarkable  Will likely require brain MRI and LP with conscious sedation  Continue triple therapy IV CTX, IV acyclovir and vancomycin.  Pharmacy consult  Consult to neurology and IR  Continue treatment per psychiatry:  IV Ativan 2 mg q4h to address possible catatonia/neurolepetic malignant syndrome in lieu of Zyprexa 5 mg daily, seeing as antipsychotics can worsen aforementioned syndromes   Do not restart antipsychotic medications, avoid serotonergic or anticholinergic agents  One-to-one observation  Monitor for hyperthermia  Consult to psychiatry  Plan for readmission to [facility] once medically stable    Schizoaffective disorder, bipolar type (HCC)  Assessment & Plan  Patient is a 302 at [facility] for acute psychotic episode.  PMHx schizoaffective disorder, bipolar type.  Zyprexa discontinued due to concern for NMS    Continue treatment with IV lorazepam per psychiatry recommendations  Continue to hold all other psychotropics  One-to-one observation  Plan for readmission to [facility] once medically stable    SIRS (systemic inflammatory response syndrome) (HCC)  Assessment & Plan  On admission at [facility] she  "met SIRS criteria with leukocytosis and tachycardia  CXR and CT CAP unremarkable  She is being treated with ceftriaxone, acyclovir and vancomycin.  Will continue  Plan for LP and MRI to rule out encephalitis.  See above plan, \"Psychosis\"  Follow urine and blood cultures    NICHOLAS (acute kidney injury) (HCC)  Assessment & Plan  Creatinine 1.8 from baseline 0.9  Resolved with IV hydration.  Monitor BMP    Acute hypernatremia  Assessment & Plan  Noted with hypernatremia, Na 150 during admission at [facility]. Started on IV dextrose infusion  Repeat BMP    Rhabdomyolysis  Assessment & Plan  Mild rhabdomyolysis.  CK peaked at 749  Continue IV hydration  Repeat a.m. CK    Reactive airway disease  Assessment & Plan  Noted at [facility] with hypoxia 86-88%.  Improved with 2LNC  Continue home inhalers    Type 2 diabetes mellitus (HCC)  Assessment & Plan  Lab Results   Component Value Date    HGBA1C 6.4 (H) 05/03/2024     Oral metformin on hold.  Monitor Accu-Cheks + sliding scale insulin  ADA diet    Recent Labs     07/05/24  0541 07/05/24  1057 07/05/24  1548 07/05/24  1945   POCGLU 81 93 90 86       Blood Sugar Average: Last 72 hrs:  (P) 86           VTE Prophylaxis:  score 2   /    Code Status: fc by default  POLST: POLST is not applicable to this patient  Discussion with family:     Anticipated Length of Stay:  Patient will be admitted on an Inpatient basis with an anticipated length of stay of  > 2 midnights.   Justification for Hospital Stay: psychosis with catatonia and rigidity, concern for NMS vs catatonia versus cholinergic rebound syndrome. Will require in person neuro evaluation, LP and MRI under conscious sedation    Total Time for Visit, including Counseling / Coordination of Care: 60 minutes.  Greater than 50% of this total time spent on direct patient counseling and coordination of care.    Chief Complaint:       History of Present Illness:    Meli Nowak is a 59 y.o. female who was transferred from . " "She was originally brought to [facility] on  after [employer] found her confused on a park bench.  She met with crisis and lacked capacity to sign a 201. A 302 was upheld and she was transferred to [facility].  She was seen for medical consultation and appeared semicatatonic and somewhat rigid concerning for NMS versus catatonia versus cholinergic rebound syndrome.  She met SIRS criteria with tachycardia and leukocytosis. She was transferred to medical floor at  for further treatment. Neurology was consulted and recommended: \"LP to exclude meningitis/encephalitis. This will likely need to be done under sedation, and hence it is appropriate to arrange for MRI brain w/wo contrast at the same time. We recommend empiric coverage with triple therapy (including acyclovir).\"  Transferred to [place] for neurologic assessment, LP and MRI to rule encephalitis/meningitis.    Patient arrived yelling, agitated and combative, unable to obtain any information from her.    Review of Systems:    Review of Systems   Unable to perform ROS: Psychiatric disorder       Past Medical and Surgical History:     Past Medical History:   Diagnosis Date    Cognitive impairment     Diabetes mellitus (HCC)     Psychosis (HCC)        Past Surgical History:   Procedure Laterality Date     SECTION      CHOLECYSTECTOMY         Meds/Allergies:    Prior to Admission medications    Medication Sig Start Date End Date Taking? Authorizing Provider   ALPRAZolam (XANAX) 0.25 mg tablet Take 0.25 mg by mouth daily at bedtime as needed for anxiety.    Historical Provider, MD   Biotin 300 MCG TABS Take by mouth.    Historical Provider, MD   Capsaicin-Menthol 0.025-10 % GEL Apply 1 small application topically 3 (three) times a day as needed (pain) 20   Milagros Brandon PA-C   clonazePAM (KlonoPIN) 1 mg tablet Take 1 mg by mouth 2 (two) times a day as needed for seizures.    Historical Provider, MD   metFORMIN (GLUCOPHAGE) 500 mg tablet Take 500 mg by mouth 2 " (two) times a day with meals.    Historical Provider, MD   PARoxetine (PAXIL) 30 mg tablet Take 30 mg by mouth 2 (two) times a day.    Historical Provider, MD     I have reviewed home medications using allscripts.    Allergies: No Known Allergies    Social History:     Marital Status: Single   Occupation:   Patient Pre-hospital Living Situation:   Patient Pre-hospital Level of Mobility: ambulatory  Patient Pre-hospital Diet Restrictions:   Substance Use History:   Social History     Substance and Sexual Activity   Alcohol Use Never    Comment: Unable to determine     Social History     Tobacco Use   Smoking Status Every Day    Current packs/day: 0.50    Types: Cigarettes   Smokeless Tobacco Never     Social History     Substance and Sexual Activity   Drug Use No       Family History:    No family history on file.    Physical Exam:     Vitals:   Blood Pressure: 127/84 (07/05/24 2055)  Pulse: 103 (07/05/24 2310)  Temperature: 98.9 °F (37.2 °C) (07/05/24 1953)  Respirations: 16 (07/05/24 2100)  SpO2: 95 % (07/05/24 2310)    Physical Exam  Constitutional:       Appearance: She is obese. She is diaphoretic.      Comments: Agitated, combative and yelling   HENT:      Head: Normocephalic and atraumatic.      Mouth/Throat:      Mouth: Mucous membranes are moist.   Neurological:      Mental Status: She is alert. She is disoriented.     UNABLE TO ASSESS, she is combative and not redirectable    Additional Data:     Lab Results: I have personally reviewed pertinent reports.      Results from last 7 days   Lab Units 07/05/24  0554   WBC Thousand/uL 9.45   HEMOGLOBIN g/dL 12.6   HEMATOCRIT % 40.7   PLATELETS Thousands/uL 303   SEGS PCT % 63   LYMPHO PCT % 23   MONO PCT % 13*   EOS PCT % 0     Results from last 7 days   Lab Units 07/05/24  2240 07/05/24  0554   SODIUM mmol/L 147 150*   POTASSIUM mmol/L 3.4* 3.9   CHLORIDE mmol/L 113* 110*   CO2 mmol/L 27 30   BUN mg/dL 34* 36*   CREATININE mg/dL 0.87 0.92   ANION GAP mmol/L 7 10    CALCIUM mg/dL 8.9 9.5   ALBUMIN g/dL  --  4.2   TOTAL BILIRUBIN mg/dL  --  0.55   ALK PHOS U/L  --  78   ALT U/L  --  22   AST U/L  --  39   GLUCOSE RANDOM mg/dL 94 76     Results from last 7 days   Lab Units 07/05/24  1234   INR  0.90     Results from last 7 days   Lab Units 07/05/24  2357 07/05/24  2232 07/05/24  1945 07/05/24  1548 07/05/24  1057 07/05/24  0541 07/04/24  2045 07/04/24  1507 07/04/24  1135 07/04/24  0542 07/03/24  2045 07/03/24  1804   POC GLUCOSE mg/dl 111 92 86 90 93 81 87 100 109 104 124 113         Results from last 7 days   Lab Units 07/05/24  0554 07/04/24  0529 07/03/24  1715   LACTIC ACID mmol/L  --   --  0.8   PROCALCITONIN ng/ml 0.10 0.21 0.26*       Imaging: I have personally reviewed pertinent reports.      MRI inpatient order    (Results Pending)   FL IN-patient lumbar puncture    (Results Pending)       EKG, Pathology, and Other Studies Reviewed on Admission:   Ct cxr  EKG NSR 90    Allscripts / [facility] Records Reviewed: Yes     ** Please Note: This note has been constructed using a voice recognition system. **

## 2024-07-06 NOTE — PROGRESS NOTES
Progress Note - Neurology   [place] ESPINOZA Nowak 59 y.o. female MRN: [id number]  Unit/Bed#: [place] [address] -01 Encounter: [id number]      Assessment & Plan   * Psychosis (HCC)  Assessment & Plan  Meli Nowak is a 59 y.o. female with schizoaffective disorder, HLD, DM type II, tobacco abuse who was initially admitted to [place] on 7/1/2024 as a 302 for worsening signs and symptoms of psychosis including paranoia and persecutory delusions.  During this admission patient received olanzapine and haloperidol.      It was later noted that patient was rigid and tremulous. She was also found to have mild leukocytosis, elevated creatinine, elevated CRP, and elevated total CK.  Neuroleptics were discontinued and patient was given IVF and Ativan. Neurology was consulted on 7/5/2024 as patient remained confused.    With concern for neuroleptic malignant syndrome, patient received bromocriptine and recommendations were made to transfer to [facility] for LP to rule out meningitis/encephalitis, as well as MRI brain.    Workup:  - CT head 7/1/2024: Unremarkable for acute intracranial abnormalities  - Labwork:  RDU on presentation positive for benzodiazepine  UA: Negative nitrite, 4-10 WBC, no bacteria seen  Urine culture [id number] CFU/mL mixed contaminants x 4  Labs on presentation WNL: Magnesium, coma panel, TSH  Creatinine elevated 7/3, 1.88  CRP elevated 7/3, 4.1  Leukocytosis noted on 7/3, 14.89  Total CK elevated 7/3, 518  Labs WNL: Ammonia, blood cultures x 2, vitamin D, vitamin B12, folate, procalcitonin    Plan:  - Plan for LP  - MRI brain pending  - Echo pending  - If CSF and MRI brain unremarkable, recommend continuing treatment of underlying psychiatric illness  - Okay to continue acyclovir at this time  - Ativan 2 mg 4 times daily ordered per psych recommendations for possible catatonia/neuroleptic malignant syndrome  - Medical management and supportive care per primary team, notify with changes    Recommendations for  outpatient neurological follow up have yet to be determined.    Subjective:   Today patient reports that she feels okay, denying all pain.  States that she wants two sodas with her dinner.    Vitals: Blood pressure 164/93, pulse 94, temperature 98.5 °F (36.9 °C), resp. rate 18, SpO2 95%.,There is no height or weight on file to calculate BMI.    Physical Exam  Vitals and nursing note reviewed.   Constitutional:       General: She is not in acute distress.     Appearance: She is obese. She is not diaphoretic.      Comments: Slightly somnolent on exam, requiring repetitive verbal stimulation   Eyes:      General:         Right eye: No discharge.         Left eye: No discharge.      Conjunctiva/sclera: Conjunctivae normal.   Skin:     General: Skin is warm and dry.   Psychiatric:         Mood and Affect: Mood normal.         Behavior: Behavior normal.       Neurologic Exam     Mental Status   Patient is asleep upon entering the room, accompanied by one-to-one.  Awakens to verbal and tactile stimulation.  Able to look at and track examiner. Patient is slightly somnolent on exam, requiring repetitive verbal stimulation.    Oriented to person.  Unable to state current location.  Incorrectly states the month is March, however is able to correct herself and stated July.  Incorrectly states the year is 2020.  Able to name objects provided.  Follows central and appendicular commands.  Answers questions appropriately.     Cranial Nerves   Primary gaze midline  Disconjugate gaze noted, left eye appears to be slightly abducted  EOMs intact, unable to reach horizontal end gaze bilaterally  No evidence of nystagmus  No obvious facial asymmetry  Tongue midline     Motor Exam   Muscle bulk: normal  Mild rigidity on exam    Attempts bilateral , 3/5    Able to wiggle toes on command bilaterally  Able to maintain BLE antigravity when placed in position for a few seconds     Sensory Exam   Light touch normal.     Gait, Coordination,  and Reflexes     Tremor   Resting tremor: absent  Unable to assess coordination due to BUE restraints    BUE reflexes 1+ throughout  Bilateral patellar reflexes 2+  Bilateral Achilles reflexes 1+    No rhythmic seizure-like activity noted throughout exam    Mild tremors noted in lips when attempting to speak     Lab Results: I have personally reviewed pertinent reports.  Recent Results (from the past 24 hour(s))   Fingerstick Glucose (POCT)    Collection Time: 07/05/24  3:48 PM   Result Value Ref Range    POC Glucose 90 65 - 140 mg/dl   Fingerstick Glucose (POCT)    Collection Time: 07/05/24  7:45 PM   Result Value Ref Range    POC Glucose 86 65 - 140 mg/dl   Fingerstick Glucose (POCT)    Collection Time: 07/05/24 10:32 PM   Result Value Ref Range    POC Glucose 92 65 - 140 mg/dl   Basic metabolic panel    Collection Time: 07/05/24 10:40 PM   Result Value Ref Range    Sodium 147 135 - 147 mmol/L    Potassium 3.4 (L) 3.5 - 5.3 mmol/L    Chloride 113 (H) 96 - 108 mmol/L    CO2 27 21 - 32 mmol/L    ANION GAP 7 4 - 13 mmol/L    BUN 34 (H) 5 - 25 mg/dL    Creatinine 0.87 0.60 - 1.30 mg/dL    Glucose 94 65 - 140 mg/dL    Calcium 8.9 8.4 - 10.2 mg/dL    eGFR 74 ml/min/1.73sq m   Fingerstick Glucose (POCT)    Collection Time: 07/05/24 11:57 PM   Result Value Ref Range    POC Glucose 111 65 - 140 mg/dl   Fingerstick Glucose (POCT)    Collection Time: 07/06/24  4:24 AM   Result Value Ref Range    POC Glucose 98 65 - 140 mg/dl   CBC and differential    Collection Time: 07/06/24  6:10 AM   Result Value Ref Range    WBC 9.53 4.31 - 10.16 Thousand/uL    RBC 4.40 3.81 - 5.12 Million/uL    Hemoglobin 12.6 11.5 - 15.4 g/dL    Hematocrit 42.9 34.8 - 46.1 %    MCV 98 82 - 98 fL    MCH 28.6 26.8 - 34.3 pg    MCHC 29.4 (L) 31.4 - 37.4 g/dL    RDW 16.9 (H) 11.6 - 15.1 %    MPV 9.1 8.9 - 12.7 fL    Platelets 266 149 - 390 Thousands/uL    nRBC 0 /100 WBCs    Segmented % 64 43 - 75 %    Immature Grans % 1 0 - 2 %    Lymphocytes % 22 14 - 44  %    Monocytes % 12 4 - 12 %    Eosinophils Relative 0 0 - 6 %    Basophils Relative 1 0 - 1 %    Absolute Neutrophils 6.12 1.85 - 7.62 Thousands/µL    Absolute Immature Grans 0.07 0.00 - 0.20 Thousand/uL    Absolute Lymphocytes 2.10 0.60 - 4.47 Thousands/µL    Absolute Monocytes 1.14 0.17 - 1.22 Thousand/µL    Eosinophils Absolute 0.00 0.00 - 0.61 Thousand/µL    Basophils Absolute 0.10 0.00 - 0.10 Thousands/µL   Comprehensive metabolic panel    Collection Time: 07/06/24  6:10 AM   Result Value Ref Range    Sodium 147 135 - 147 mmol/L    Potassium 3.7 3.5 - 5.3 mmol/L    Chloride 112 (H) 96 - 108 mmol/L    CO2 30 21 - 32 mmol/L    ANION GAP 5 4 - 13 mmol/L    BUN 31 (H) 5 - 25 mg/dL    Creatinine 0.87 0.60 - 1.30 mg/dL    Glucose 104 65 - 140 mg/dL    Calcium 8.8 8.4 - 10.2 mg/dL    AST 29 13 - 39 U/L    ALT 23 7 - 52 U/L    Alkaline Phosphatase 81 34 - 104 U/L    Total Protein 6.7 6.4 - 8.4 g/dL    Albumin 4.1 3.5 - 5.0 g/dL    Total Bilirubin 0.44 0.20 - 1.00 mg/dL    eGFR 74 ml/min/1.73sq m   Magnesium    Collection Time: 07/06/24  6:10 AM   Result Value Ref Range    Magnesium 2.1 1.9 - 2.7 mg/dL   Phosphorus    Collection Time: 07/06/24  6:10 AM   Result Value Ref Range    Phosphorus 4.4 2.7 - 4.5 mg/dL   Vancomycin, random    Collection Time: 07/06/24  6:10 AM   Result Value Ref Range    Vancomycin Rm 14.9 10.0 - 20.0 ug/mL   CK    Collection Time: 07/06/24  6:10 AM   Result Value Ref Range    Total  (H) 26 - 192 U/L   Fingerstick Glucose (POCT)    Collection Time: 07/06/24 11:56 AM   Result Value Ref Range    POC Glucose 97 65 - 140 mg/dl     Imaging Studies: I have personally reviewed pertinent reports and I have personally reviewed pertinent films in PACS.    EKG, Pathology, and Other Studies: I have personally reviewed pertinent reports.    Dictation voice to text software has been used in the creation of this document.  Please consider this in light of any contextual or grammatical errors.

## 2024-07-06 NOTE — ASSESSMENT & PLAN NOTE
Noted with hypernatremia, Na 150 during admission at [place]. Started on IV dextrose infusion  Monitor blood sugars  Improving  Recent Labs     07/05/24  0554 07/05/24  2240 07/06/24  0610   SODIUM 150* 147 147

## 2024-07-06 NOTE — ASSESSMENT & PLAN NOTE
Patient is a 302 at [place] for acute psychotic episode.  PMHx schizoaffective disorder, bipolar type.   Continue treatment with IV lorazepam per psychiatry recommendations  Continue to hold all other psychotropics  One-to-one observation  Plan for readmission to [facility] once medically stable

## 2024-07-06 NOTE — ASSESSMENT & PLAN NOTE
Patient is a 302 at [place] for acute psychotic episode.  She was originally brought to [facility] after [employer] found her confused on a park bench.  She met with crisis and lacked capacity to sign a 201. A 302 was upheld and [facility]  During admission, she was noted semicatatonic and somewhat rigid concerning for NMS versus catatonia versus cholinergic rebound syndrome.  She met SIRS criteria with tachycardia and leukocytosis.  Transferred to [place] for neurologic assessment, LP and MRI to rule encephalitis/meningitis    CTH unremarkable  Lumbar puncture performed-results reviewed, no evidence of bacterial meningitis  Discontinue vancomycin and ceftriaxone  Continue IV acyclovir  Continue treatment per psychiatry:  IV Ativan 2 mg q4h to address possible catatonia/neurolepetic malignant syndrome in lieu of Zyprexa 5 mg daily, seeing as antipsychotics can worsen aforementioned syndromes   Do not restart antipsychotic medications, avoid serotonergic or anticholinergic agents  One-to-one observation  Monitor for hyperthermia  Plan for readmission to [facility] once medically stable

## 2024-07-06 NOTE — ASSESSMENT & PLAN NOTE
Noted with hypernatremia, Na 150 during admission at [place]. Started on IV dextrose infusion  Repeat BMP

## 2024-07-06 NOTE — ASSESSMENT & PLAN NOTE
Lab Results   Component Value Date    HGBA1C 6.4 (H) 05/03/2024     Oral metformin on hold.  Monitor Accu-Cheks + sliding scale insulin  ADA diet    Recent Labs     07/05/24  2232 07/05/24  2357 07/06/24  0424 07/06/24  1156   POCGLU 92 111 98 97         Blood Sugar Average: Last 72 hrs:  (P) 96.8

## 2024-07-06 NOTE — ASSESSMENT & PLAN NOTE
Patient is a 302 at [place] for acute psychotic episode.  PMHx schizoaffective disorder, bipolar type.  Zyprexa discontinued due to concern for NMS    Continue treatment with IV lorazepam per psychiatry recommendations  Continue to hold all other psychotropics  One-to-one observation  Plan for readmission to [facility] once medically stable

## 2024-07-06 NOTE — ASSESSMENT & PLAN NOTE
Patient is a 302 at [place] for acute psychotic episode.  She was originally brought to [facility] after [employer] found her confused on a park bench.  She met with crisis and lacked capacity to sign a 201. A 302 was upheld and she was transferred to [facility].  During admission, she was noted semicatatonic and somewhat rigid concerning for NMS versus catatonia versus cholinergic rebound syndrome.  She met SIRS criteria with tachycardia and leukocytosis.  Transferred to [place] for neurologic assessment, LP and MRI to rule encephalitis/meningitis    CTH unremarkable  Will likely require brain MRI and LP with conscious sedation  Continue triple therapy IV CTX, IV acyclovir and vancomycin.  Pharmacy consult  Consult to neurology and IR  Continue treatment per psychiatry:  IV Ativan 2 mg q4h to address possible catatonia/neurolepetic malignant syndrome in lieu of Zyprexa 5 mg daily, seeing as antipsychotics can worsen aforementioned syndromes   Do not restart antipsychotic medications, avoid serotonergic or anticholinergic agents  One-to-one observation  Monitor for hyperthermia  Consult to psychiatry  Plan for readmission to psych once medically stable

## 2024-07-06 NOTE — ASSESSMENT & PLAN NOTE
Lab Results   Component Value Date    HGBA1C 6.4 (H) 05/03/2024     Oral metformin on hold.  Monitor Accu-Cheks + sliding scale insulin  ADA diet    Recent Labs     07/05/24  0541 07/05/24  1057 07/05/24  1548 07/05/24  1945   POCGLU 81 93 90 86       Blood Sugar Average: Last 72 hrs:  (P) 86

## 2024-07-06 NOTE — PROGRESS NOTES
Meli Nowak is a 59 y.o. female who is currently ordered Vancomycin IV with management by the Pharmacy Consult service.  Relevant clinical data and objective / subjective history reviewed.  Vancomycin Assessment:  Indication and Goal AUC/Trough: CNS infection (goal -600, trough 15-20)  Clinical Status: stable  Micro:     Renal Function:  SCr: 0.87 mg/dL  CrCl: 93.1 mL/min  Renal replacement: Not on dialysis  Days of Therapy: 2  Current Dose: 750 mg IV q8h  Vancomycin Plan:  New Dosin mg IV q12h  Estimated AUC: 526 mcg*hr/mL  Estimated Trough: 16.7 mcg/mL  Next Level:  with AM labs  Renal Function Monitoring: Daily BMP and UOP  Pharmacy will continue to follow closely for s/sx of nephrotoxicity, infusion reactions and appropriateness of therapy.  BMP and CBC will be ordered per protocol. We will continue to follow the patient’s culture results and clinical progress daily.    Narayan Sanders, Pharmacist

## 2024-07-06 NOTE — ASSESSMENT & PLAN NOTE
On admission at [place] she met SIRS criteria with leukocytosis and tachycardia  CXR and CT CAP unremarkable  LP negative for bacterial meningitis  Continue acyclovir for now pending ME panel and HSV and VZV  Possibly secondary to neuroleptic malignant syndrome

## 2024-07-06 NOTE — PROGRESS NOTES
[facility]  Progress Note  Name: Meli Nowak I  MRN: [id number]  Unit/Bed#: [place] [address] -01 I Date of Admission: 7/5/2024   Date of Service: 7/6/2024 I Hospital Day: 1    Assessment & Plan   * Psychosis ([facility])  Assessment & Plan  Patient is a 302 at [place] for acute psychotic episode.  She was originally brought to [facility] after [employer] found her confused on a park bench.  She met with crisis and lacked capacity to sign a 201. A 302 was upheld and she was transferred to [facility].  During admission, she was noted semicatatonic and somewhat rigid concerning for NMS versus catatonia versus cholinergic rebound syndrome.  She met SIRS criteria with tachycardia and leukocytosis.  Transferred to [place] for neurologic assessment, LP and MRI to rule encephalitis/meningitis    CTH unremarkable  Lumbar puncture performed-results reviewed, no evidence of bacterial meningitis  Discontinue vancomycin and ceftriaxone  Continue IV acyclovir  Continue treatment per psychiatry:  IV Ativan 2 mg q4h to address possible catatonia/neurolepetic malignant syndrome in lieu of Zyprexa 5 mg daily, seeing as antipsychotics can worsen aforementioned syndromes   Do not restart antipsychotic medications, avoid serotonergic or anticholinergic agents  One-to-one observation  Monitor for hyperthermia  Plan for readmission to [facility] once medically stable    Schizoaffective disorder, bipolar type ([facility])  Assessment & Plan  Patient is a 302 at [place] for acute psychotic episode.  PMHx schizoaffective disorder, bipolar type.   Continue treatment with IV lorazepam per psychiatry recommendations  Continue to hold all other psychotropics  One-to-one observation  Plan for readmission to [facility] once medically stable    Acute hypernatremia  Assessment & Plan  Noted with hypernatremia, Na 150 during admission at [place]. Started on IV dextrose infusion  Monitor blood sugars  Improving  Recent Labs     07/05/24  3735  07/05/24  2240 07/06/24  0610   SODIUM 150* 147 147         Rhabdomyolysis  Assessment & Plan  Mild rhabdomyolysis.  CK peaked at 749  Continue IV hydration  CK improved        SIRS (systemic inflammatory response syndrome) ([facility])  Assessment & Plan  On admission at [place] she met SIRS criteria with leukocytosis and tachycardia  CXR and CT CAP unremarkable  LP negative for bacterial meningitis  Continue acyclovir for now pending ME panel and HSV and VZV  Possibly secondary to neuroleptic malignant syndrome    NICHOLAS (acute kidney injury) ([facility])  Assessment & Plan  Recent Labs     07/05/24  0554 07/05/24  2240 07/06/24  0610   CREATININE 0.92 0.87 0.87   Improved back to baseline      Reactive airway disease  Assessment & Plan  Noted at [place] with hypoxia 86-88%.  Improved with 2LNC  Continue home inhalers    Type 2 diabetes mellitus ([facility])  Assessment & Plan  Lab Results   Component Value Date    HGBA1C 6.4 (H) 05/03/2024     Oral metformin on hold.  Monitor Accu-Cheks + sliding scale insulin  ADA diet    Recent Labs     07/05/24  2232 07/05/24  2357 07/06/24  0424 07/06/24  1156   POCGLU 92 111 98 97         Blood Sugar Average: Last 72 hrs:  (P) 96.8                   Hospital Course:     59-year-old female patient admitted to the inpatient behavioral health unit with psychotic episode.  Transferred for atypical NMS.  Transferred for evaluation for CNS infection.  And possible MRI of the brain with and without contrast.    Assessment:      Principal Problem:    Psychosis ([facility])  Active Problems:    Type 2 diabetes mellitus ([facility])    Reactive airway disease    NICHOLAS (acute kidney injury) ([facility])    SIRS (systemic inflammatory response syndrome) ([facility])    Rhabdomyolysis    Acute hypernatremia    Schizoaffective disorder, bipolar type ([facility])      Plan:    Discontinue antibiotics as low clinical for bacterial meningitis  Will discuss with neurology need for MRI of the brain.  Unfortunately MRI screening cannot be  performed  Recheck labs in a.m.  Resume diet  With large murmur, will check echocardiogram       VTE Pharmacologic Prophylaxis:   Pharmacologic: Enoxaparin (Lovenox)  Mechanical VTE Prophylaxis in Place: Yes    AM-PAC Basic Mobility:  Basic Mobility Inpatient Raw Score: 6    JH-HLM Achieved: 3: Sit at edge of bed  JH-HLM Goal: 2: Bed activities/Dependent transfer    HLM Goal listed above. Continue with multidisciplinary rounding and encourage appropriate mobility to improve upon HLM goals.         Patient Centered Rounds: Case discussed and reviewed with nursing    Discussions with Specialists or Other Care Team Provider: Case management    Education and Discussions with Family / Patient: Patient daughter and aunt contact    Time Spent for Care: 80 minutes.  More than 50% of total time spent on counseling and coordination of care as described above.    Current Length of Stay: 1 day(s)    Current Patient Status: Inpatient   Certification Statement: The patient will continue to require additional inpatient hospital stay due to need for MRI of the brain and neurologic evaluation    Discharge Plan / Estimated Discharge Date: 24 to 48 hours pending MRI    Code Status: Level 1 - Full Code      Subjective:   Seen and examined, patient was receiving Ativan, she was somewhat somnolent but arousable.  She did ask why she was getting a lumbar puncture.  Denies any chest pain shortness of breath nausea or vomiting    A complete and comprehensive 14 point organ system review has been performed and all other systems are negative other than stated above.    Objective:     Vitals:   Temp (24hrs), Av.3 °F (37.4 °C), Min:98.5 °F (36.9 °C), Max:100.5 °F (38.1 °C)    Temp:  [98.5 °F (36.9 °C)-100.5 °F (38.1 °C)] 100.5 °F (38.1 °C)  HR:  [] 96  Resp:  [16-21] 18  BP: (127-184)/() 157/88  SpO2:  [95 %-96 %] 95 %  There is no height or weight on file to calculate BMI.     Input and Output Summary (last 24 hours):        Intake/Output Summary (Last 24 hours) at 7/6/2024 1810  Last data filed at 7/6/2024 1323  Gross per 24 hour   Intake 891 ml   Output 154 ml   Net 737 ml       Physical Exam:     General: well appearing, no acute distress  HEENT: atraumatic, PERRLA, moist mucosa, normal pharynx, normal tonsils and adenoids, normal tongue, no fluid in sinuses  Neck: Trachea midline, no carotid bruit, no masses  Respiratory: normal chest wall expansion, CTA B, no r/r/w, no rubs  Cardiovascular: RRR, 3 out of 6 systolic ejection murmur heard best at the right upper sternal border normal S1 and S2  Abdomen: Soft, non-tender, non-distended, normal bowel sounds in all quadrants, no hepatosplenomegaly, no tympany  Rectal: deferred  Musculoskeletal: normal ROM in upper and lower extremities  Integumentary: warm, dry, and pink, with no rash, purpura, or petechia  Heme/Lymph: no lymphadenopathy, no bruises  Neurological: Cranial Nerves II-XII grossly intact  Psychiatric: cooperative with normal mood, affect, and cognition      Additional Data:     Labs:    Results from last 7 days   Lab Units 07/06/24  0610   WBC Thousand/uL 9.53   HEMOGLOBIN g/dL 12.6   HEMATOCRIT % 42.9   PLATELETS Thousands/uL 266   SEGS PCT % 64   LYMPHO PCT % 22   MONO PCT % 12   EOS PCT % 0     Results from last 7 days   Lab Units 07/06/24  0610   POTASSIUM mmol/L 3.7   CHLORIDE mmol/L 112*   CO2 mmol/L 30   BUN mg/dL 31*   CREATININE mg/dL 0.87   CALCIUM mg/dL 8.8   ALK PHOS U/L 81   ALT U/L 23   AST U/L 29     Results from last 7 days   Lab Units 07/05/24  1234   INR  0.90       * I Have Reviewed All Lab Data Listed Above.  * Additional Pertinent Lab Tests Reviewed: All Labs For Current Hospital Admission Reviewed    Imaging:    Imaging Reports Reviewed Today Include: No new imaging  Imaging Personally Reviewed by Myself Includes: No new imaging    Recent Cultures (last 7 days):     Results from last 7 days   Lab Units 07/03/24  2133 07/03/24  1330   BLOOD  CULTURE   --  No Growth at 48 hrs.  No Growth at 48 hrs.   URINE CULTURE  40,000-49,000 cfu/ml  --        Last 24 Hours Medication List:   Current Facility-Administered Medications   Medication Dose Route Frequency Provider Last Rate    acetaminophen  650 mg Oral Q4H PRN JOSE ELIAS Arreola      acyclovir  10 mg/kg (Adjusted) Intravenous Q8H Jessi Weiner, CHRISTOPHERNP 825 mg (07/06/24 1238)    albuterol  2 puff Inhalation Q4H PRN JOSE ELIAS Arreola      aluminum-magnesium hydroxide-simethicone  30 mL Oral Q4H PRN Jessi Weiner, JOSE ELIAS      aspirin  81 mg Oral Daily Jessi Weiner, JOSE ELIAS      cyanocobalamin  1,000 mcg Oral Daily Jessi Weiner, JOSE ELIAS      dextrose  83 mL/hr Intravenous Continuous Girsih Vargas, DO 83 mL/hr (07/06/24 1651)    fluticasone  1 puff Inhalation Daily JOSE ELIAS Arreola      hydrALAZINE  10 mg Intravenous Q6H PRN Jessi Weiner, JOSE ELIAS      hydrOXYzine HCL  50 mg Oral Q6H PRN Max 4/day Jessi Weiner, JOSE ELIAS      insulin lispro  1-5 Units Subcutaneous Q6H Girish Vargas DO      LORazepam  2 mg Intravenous Q4H Jessi Weiner, JOSE ELIAS      melatonin  3 mg Oral HS Jessi Weiner, JOSE ELIAS      nicotine polacrilex  4 mg Oral Q2H PRN Jessi Weiner, JOSE ELIAS      ondansetron  4 mg Oral Q8H PRN Jessi Weiner, JOSE ELIAS      polyethylene glycol  17 g Oral Daily Jessi Weiner, JOSE ELIAS      senna  2 tablet Oral HS Jessi Weiner, JOSE ELIAS      senna-docusate sodium  1 tablet Oral Daily PRN Jessi Weiner, JOSE ELIAS      traZODone  50 mg Oral HS PRN JOSE ELIAS Arreola         AM-PAC Basic Mobility:  Basic Mobility Inpatient Raw Score: 6    JH-HLM Achieved: 3: Sit at edge of bed  JH-HLM Goal: 2: Bed activities/Dependent transfer    HLM Goal listed above. Continue with multidisciplinary rounding and encourage appropriate mobility to improve upon HLM goals.     Today, Patient Was Seen By: Girish Vargas DO    ** Please Note: This note was completed in part  utilizing [employer] software dictation.  Grammatical errors, random word insertions, spelling mistakes, and incomplete sentences may be an occasional consequence of this system secondary to software limitations, ambient noise, and hardware issues.  If you have any questions or concerns about the content, text, or information contained within the body of this dictation, please contact the provider for clarification. **

## 2024-07-06 NOTE — PROGRESS NOTES
Vancomycin Monitoring    Demographics    Meli Nowak    Assessment    Today is day 1 of vancomycin therapy for CNS infection. Patient received first dose of vanco 1,750 mg today at  at approx 1500. Renal function is stable.    Plan    Vanco 500 mg x1 tonight at approx 2230 to avoid overnight dosing and complete load followed by vanco 750 mg q8h starting tomorrow at approx 0630    Level ordered for 7/6 am labs     Kameron Saenz PharmD

## 2024-07-06 NOTE — CONSULTS
Duplicate order, please see original neurology consult completed on 7/5/2024 by Dr. Greg Morgan via Telemedicine.

## 2024-07-06 NOTE — PLAN OF CARE
Problem: Prexisting or High Potential for Compromised Skin Integrity  Goal: Skin integrity is maintained or improved  Description: INTERVENTIONS:  - Identify patients at risk for skin breakdown  - Assess and monitor skin integrity  - Assess and monitor nutrition and hydration status  - Monitor labs   - Assess for incontinence   - Turn and reposition patient  - Assist with mobility/ambulation  - Relieve pressure over bony prominences  - Avoid friction and shearing  - Provide appropriate hygiene as needed including keeping skin clean and dry  - Evaluate need for skin moisturizer/barrier cream  - Collaborate with interdisciplinary team   - Patient/family teaching  - Consider wound care consult   Outcome: Progressing     Problem: MOBILITY - ADULT  Goal: Maintain or return to baseline ADL function  Description: INTERVENTIONS:  -  Assess patient's ability to carry out ADLs; assess patient's baseline for ADL function and identify physical deficits which impact ability to perform ADLs (bathing, care of mouth/teeth, toileting, grooming, dressing, etc.)  - Assess/evaluate cause of self-care deficits   - Assess range of motion  - Assess patient's mobility; develop plan if impaired  - Assess patient's need for assistive devices and provide as appropriate  - Encourage maximum independence but intervene and supervise when necessary  - Involve family in performance of ADLs  - Assess for home care needs following discharge   - Consider OT consult to assist with ADL evaluation and planning for discharge  - Provide patient education as appropriate  Outcome: Progressing  Goal: Maintains/Returns to pre admission functional level  Description: INTERVENTIONS:  - Perform AM-PAC 6 Click Basic Mobility/ Daily Activity assessment daily.  - Set and communicate daily mobility goal to care team and patient/family/caregiver.   - Collaborate with rehabilitation services on mobility goals if consulted  - Perform Range of Motion 3 times a day.  -  Reposition patient every 2 hours.  - Dangle patient 3 times a day  - Stand patient 3 times a day  - Ambulate patient 3 times a day  - Out of bed to chair 3 times a day   - Out of bed for meals 3 times a day  - Out of bed for toileting  - Record patient progress and toleration of activity level   Outcome: Progressing     Problem: PAIN - ADULT  Goal: Verbalizes/displays adequate comfort level or baseline comfort level  Description: Interventions:  - Encourage patient to monitor pain and request assistance  - Assess pain using appropriate pain scale  - Administer analgesics based on type and severity of pain and evaluate response  - Implement non-pharmacological measures as appropriate and evaluate response  - Consider cultural and social influences on pain and pain management  - Notify physician/advanced practitioner if interventions unsuccessful or patient reports new pain  Outcome: Progressing     Problem: INFECTION - ADULT  Goal: Absence or prevention of progression during hospitalization  Description: INTERVENTIONS:  - Assess and monitor for signs and symptoms of infection  - Monitor lab/diagnostic results  - Monitor all insertion sites, i.e. indwelling lines, tubes, and drains  - Monitor endotracheal if appropriate and nasal secretions for changes in amount and color  - [place] appropriate cooling/warming therapies per order  - Administer medications as ordered  - Instruct and encourage patient and family to use good hand hygiene technique  - Identify and instruct in appropriate isolation precautions for identified infection/condition  Outcome: Progressing     Problem: SAFETY ADULT  Goal: Maintain or return to baseline ADL function  Description: INTERVENTIONS:  -  Assess patient's ability to carry out ADLs; assess patient's baseline for ADL function and identify physical deficits which impact ability to perform ADLs (bathing, care of mouth/teeth, toileting, grooming, dressing, etc.)  - Assess/evaluate cause of  self-care deficits   - Assess range of motion  - Assess patient's mobility; develop plan if impaired  - Assess patient's need for assistive devices and provide as appropriate  - Encourage maximum independence but intervene and supervise when necessary  - Involve family in performance of ADLs  - Assess for home care needs following discharge   - Consider OT consult to assist with ADL evaluation and planning for discharge  - Provide patient education as appropriate  Outcome: Progressing  Goal: Maintains/Returns to pre admission functional level  Description: INTERVENTIONS:  - Perform AM-PAC 6 Click Basic Mobility/ Daily Activity assessment daily.  - Set and communicate daily mobility goal to care team and patient/family/caregiver.   - Collaborate with rehabilitation services on mobility goals if consulted  - Perform Range of Motion 4 times a day.  - Reposition patient every 2 hours.  - Dangle patient 3 times a day  - Stand patient 3 times a day  - Ambulate patient 3 times a day  - Out of bed to chair 3 times a day   - Out of bed for meals 3 times a day  - Out of bed for toileting  - Record patient progress and toleration of activity level   Outcome: Progressing  Goal: Patient will remain free of falls  Description: INTERVENTIONS:  - Educate patient/family on patient safety including physical limitations  - Instruct patient to call for assistance with activity   - Consult OT/PT to assist with strengthening/mobility   - Keep Call bell within reach  - Keep bed low and locked with side rails adjusted as appropriate  - Keep care items and personal belongings within reach  - Initiate and maintain comfort rounds  - Make Fall Risk Sign visible to staff  - Offer Toileting every 2 Hours, in advance of need  - Initiate/Maintain bed alarm  - Obtain necessary fall risk management equipment: alarm   - Apply yellow socks and bracelet for high fall risk patients  - Consider moving patient to room near nurses station  Outcome:  Progressing     Problem: DISCHARGE PLANNING  Goal: Discharge to home or other facility with appropriate resources  Description: INTERVENTIONS:  - Identify barriers to discharge w/patient and caregiver  - Arrange for needed discharge resources and transportation as appropriate  - Identify discharge learning needs (meds, wound care, etc.)  - Arrange for interpretive services to assist at discharge as needed  - Refer to Case Management Department for coordinating discharge planning if the patient needs post-hospital services based on physician/advanced practitioner order or complex needs related to functional status, cognitive ability, or social support system  Outcome: Progressing     Problem: Knowledge Deficit  Goal: Patient/family/caregiver demonstrates understanding of disease process, treatment plan, medications, and discharge instructions  Description: Complete learning assessment and assess knowledge base.  Interventions:  - Provide teaching at level of understanding  - Provide teaching via preferred learning methods  Outcome: Progressing

## 2024-07-06 NOTE — PLAN OF CARE
Problem: Prexisting or High Potential for Compromised Skin Integrity  Goal: Skin integrity is maintained or improved  Description: INTERVENTIONS:  - Identify patients at risk for skin breakdown  - Assess and monitor skin integrity  - Assess and monitor nutrition and hydration status  - Monitor labs   - Assess for incontinence   - Turn and reposition patient  - Assist with mobility/ambulation  - Relieve pressure over bony prominences  - Avoid friction and shearing  - Provide appropriate hygiene as needed including keeping skin clean and dry  - Evaluate need for skin moisturizer/barrier cream  - Collaborate with interdisciplinary team   - Patient/family teaching  - Consider wound care consult   7/6/2024 0713 by Jolene Wang RN  Outcome: Progressing    Problem: PAIN - ADULT  Goal: Verbalizes/displays adequate comfort level or baseline comfort level  Description: Interventions:  - Encourage patient to monitor pain and request assistance  - Assess pain using appropriate pain scale  - Administer analgesics based on type and severity of pain and evaluate response  - Implement non-pharmacological measures as appropriate and evaluate response  - Consider cultural and social influences on pain and pain management  - Notify physician/advanced practitioner if interventions unsuccessful or patient reports new pain  7/6/2024 0713 by Jolene Wang RN  Outcome: Progressing      Problem: INFECTION - ADULT  Goal: Absence or prevention of progression during hospitalization  Description: INTERVENTIONS:  - Assess and monitor for signs and symptoms of infection  - Monitor lab/diagnostic results  - Monitor all insertion sites, i.e. indwelling lines, tubes, and drains  - Monitor endotracheal if appropriate and nasal secretions for changes in amount and color  - [place] appropriate cooling/warming therapies per order  - Administer medications as ordered  - Instruct and encourage patient and family to use good hand hygiene  technique  - Identify and instruct in appropriate isolation precautions for identified infection/condition  7/6/2024 0713 by Jolene Wang RN  Outcome: Progressing  7/6/2024 0712 by Jolene Wang RN  Outcome: Progressing     Problem: SAFETY ADULT  Goal: Maintain or return to baseline ADL function  Description: INTERVENTIONS:  -  Assess patient's ability to carry out ADLs; assess patient's baseline for ADL function and identify physical deficits which impact ability to perform ADLs (bathing, care of mouth/teeth, toileting, grooming, dressing, etc.)  - Assess/evaluate cause of self-care deficits   - Assess range of motion  - Assess patient's mobility; develop plan if impaired  - Assess patient's need for assistive devices and provide as appropriate  - Encourage maximum independence but intervene and supervise when necessary  - Involve family in performance of ADLs  - Assess for home care needs following discharge   - Consider OT consult to assist with ADL evaluation and planning for discharge  - Provide patient education as appropriate  7/6/2024 0713 by Jolene Wang RN  Outcome: Progressing  7/6/2024 0712 by Jolene Wang RN  Outcome: Progressing  Goal: Maintains/Returns to pre admission functional level  Description: INTERVENTIONS:  - Perform AM-PAC 6 Click Basic Mobility/ Daily Activity assessment daily.  - Set and communicate daily mobility goal to care team and patient/family/caregiver.   - Collaborate with rehabilitation services on mobility goals if consulted  - Perform Range of Motion 3 times a day.  - Reposition patient every 2 hours.  - Dangle patient 2 times a day  - Stand patient 3 times a day  - Ambulate patient 3 times a day  - Out of bed to chair 3 times a day   - Out of bed for meals 3 times a day  - Out of bed for toileting  - Record patient progress and toleration of activity level   7/6/2024 0713 by Jolene Wang RN  Outcome: Progressing  Goal: Patient will remain free of  falls  Description: INTERVENTIONS:  -  Assess patient's ability to carry out ADLs; assess patient's baseline for ADL function and identify physical deficits which impact ability to perform ADLs (bathing, care of mouth/teeth, toileting, grooming, dressing, etc.)  - Assess/evaluate cause of self-care deficits   - Assess range of motion  - Assess patient's mobility; develop plan if impaired  - Assess patient's need for assistive devices and provide as appropriate  - Encourage maximum independence but intervene and supervise when necessary  - Involve family in performance of ADLs  - Assess for home care needs following discharge   - Consider OT consult to assist with ADL evaluation and planning for discharge  - Provide patient education as appropriate  7/6/2024 0713 by Jolene Wang RN  Outcome: Progressing

## 2024-07-07 ENCOUNTER — APPOINTMENT (INPATIENT)
Dept: NON INVASIVE DIAGNOSTICS | Facility: HOSPITAL | Age: 59
DRG: 682 | End: 2024-07-07
Payer: MEDICARE

## 2024-07-07 ENCOUNTER — APPOINTMENT (INPATIENT)
Dept: RADIOLOGY | Facility: HOSPITAL | Age: 59
DRG: 682 | End: 2024-07-07
Payer: MEDICARE

## 2024-07-07 PROBLEM — R41.82 ALTERED MENTAL STATUS: Status: ACTIVE | Noted: 2024-07-07

## 2024-07-07 LAB
ANION GAP SERPL CALCULATED.3IONS-SCNC: 5 MMOL/L (ref 4–13)
BACTERIA BLD CULT: NORMAL
BACTERIA BLD CULT: NORMAL
BUN SERPL-MCNC: 23 MG/DL (ref 5–25)
C GATTII+NEOFOR DNA CSF QL NAA+NON-PROBE: NOT DETECTED
CALCIUM SERPL-MCNC: 8.5 MG/DL (ref 8.4–10.2)
CHLORIDE SERPL-SCNC: 107 MMOL/L (ref 96–108)
CK SERPL-CCNC: 287 U/L (ref 26–192)
CMV DNA CSF QL NAA+NON-PROBE: NOT DETECTED
CO2 SERPL-SCNC: 31 MMOL/L (ref 21–32)
CREAT SERPL-MCNC: 0.8 MG/DL (ref 0.6–1.3)
E COLI K1 DNA CSF QL NAA+NON-PROBE: NOT DETECTED
ERYTHROCYTE [DISTWIDTH] IN BLOOD BY AUTOMATED COUNT: 16.2 % (ref 11.6–15.1)
EV RNA CSF QL NAA+NON-PROBE: NOT DETECTED
GFR SERPL CREATININE-BSD FRML MDRD: 82 ML/MIN/1.73SQ M
GLUCOSE SERPL-MCNC: 101 MG/DL (ref 65–140)
GLUCOSE SERPL-MCNC: 103 MG/DL (ref 65–140)
GLUCOSE SERPL-MCNC: 93 MG/DL (ref 65–140)
GLUCOSE SERPL-MCNC: 95 MG/DL (ref 65–140)
GP B STREP DNA CSF QL NAA+NON-PROBE: NOT DETECTED
HAEM INFLU DNA CSF QL NAA+NON-PROBE: NOT DETECTED
HCT VFR BLD AUTO: 39.2 % (ref 34.8–46.1)
HGB BLD-MCNC: 11.5 G/DL (ref 11.5–15.4)
HHV6 DNA CSF QL NAA+NON-PROBE: NOT DETECTED
HSV1 DNA CSF QL NAA+NON-PROBE: NOT DETECTED
HSV2 DNA CSF QL NAA+NON-PROBE: NOT DETECTED
L MONOCYTOG DNA CSF QL NAA+NON-PROBE: NOT DETECTED
MCH RBC QN AUTO: 27.6 PG (ref 26.8–34.3)
MCHC RBC AUTO-ENTMCNC: 29.3 G/DL (ref 31.4–37.4)
MCV RBC AUTO: 94 FL (ref 82–98)
N MEN DNA CSF QL NAA+NON-PROBE: NOT DETECTED
PARECHOVIRUS A RNA CSF QL NAA+NON-PROBE: NOT DETECTED
PLATELET # BLD AUTO: 228 THOUSANDS/UL (ref 149–390)
PMV BLD AUTO: 9.5 FL (ref 8.9–12.7)
POTASSIUM SERPL-SCNC: 3.3 MMOL/L (ref 3.5–5.3)
RBC # BLD AUTO: 4.17 MILLION/UL (ref 3.81–5.12)
S PNEUM DNA CSF QL NAA+NON-PROBE: NOT DETECTED
SODIUM SERPL-SCNC: 143 MMOL/L (ref 135–147)
VZV DNA CSF QL NAA+NON-PROBE: NOT DETECTED
WBC # BLD AUTO: 9.06 THOUSAND/UL (ref 4.31–10.16)

## 2024-07-07 PROCEDURE — 82948 REAGENT STRIP/BLOOD GLUCOSE: CPT

## 2024-07-07 PROCEDURE — 80048 BASIC METABOLIC PNL TOTAL CA: CPT | Performed by: INTERNAL MEDICINE

## 2024-07-07 PROCEDURE — 99233 SBSQ HOSP IP/OBS HIGH 50: CPT | Performed by: INTERNAL MEDICINE

## 2024-07-07 PROCEDURE — 82550 ASSAY OF CK (CPK): CPT | Performed by: INTERNAL MEDICINE

## 2024-07-07 PROCEDURE — 85027 COMPLETE CBC AUTOMATED: CPT | Performed by: INTERNAL MEDICINE

## 2024-07-07 RX ORDER — LORAZEPAM 2 MG/ML
1 INJECTION INTRAMUSCULAR ONCE
Status: COMPLETED | OUTPATIENT
Start: 2024-07-07 | End: 2024-07-07

## 2024-07-07 RX ORDER — LORAZEPAM 2 MG/ML
1 INJECTION INTRAMUSCULAR EVERY 4 HOURS
Status: DISCONTINUED | OUTPATIENT
Start: 2024-07-07 | End: 2024-07-11 | Stop reason: HOSPADM

## 2024-07-07 RX ORDER — POTASSIUM CHLORIDE 14.9 MG/ML
20 INJECTION INTRAVENOUS
Status: COMPLETED | OUTPATIENT
Start: 2024-07-07 | End: 2024-07-07

## 2024-07-07 RX ORDER — DEXTROSE MONOHYDRATE 50 MG/ML
83 INJECTION, SOLUTION INTRAVENOUS CONTINUOUS
Status: DISPENSED | OUTPATIENT
Start: 2024-07-07 | End: 2024-07-08

## 2024-07-07 RX ORDER — LORAZEPAM 2 MG/ML
2 INJECTION INTRAMUSCULAR EVERY 4 HOURS PRN
Status: DISCONTINUED | OUTPATIENT
Start: 2024-07-07 | End: 2024-07-07

## 2024-07-07 RX ADMIN — LORAZEPAM 1 MG: 2 INJECTION INTRAMUSCULAR; INTRAVENOUS at 13:56

## 2024-07-07 RX ADMIN — LORAZEPAM 1 MG: 2 INJECTION INTRAMUSCULAR; INTRAVENOUS at 21:14

## 2024-07-07 RX ADMIN — ENOXAPARIN SODIUM 40 MG: 40 INJECTION SUBCUTANEOUS at 09:07

## 2024-07-07 RX ADMIN — THIAMINE HYDROCHLORIDE 500 MG: 100 INJECTION, SOLUTION INTRAMUSCULAR; INTRAVENOUS at 21:14

## 2024-07-07 RX ADMIN — LORAZEPAM 2 MG: 2 INJECTION INTRAMUSCULAR; INTRAVENOUS at 04:39

## 2024-07-07 RX ADMIN — LORAZEPAM 2 MG: 2 INJECTION INTRAMUSCULAR; INTRAVENOUS at 00:20

## 2024-07-07 RX ADMIN — POTASSIUM CHLORIDE 20 MEQ: 14.9 INJECTION, SOLUTION INTRAVENOUS at 11:47

## 2024-07-07 RX ADMIN — ACYCLOVIR SODIUM 825 MG: 50 INJECTION, SOLUTION INTRAVENOUS at 05:35

## 2024-07-07 RX ADMIN — POTASSIUM CHLORIDE 20 MEQ: 14.9 INJECTION, SOLUTION INTRAVENOUS at 09:08

## 2024-07-07 NOTE — ASSESSMENT & PLAN NOTE
Patient is a 89 at [facility] for acute psychotic episode.  PMHx schizoaffective disorder, bipolar type.   Continue treatment with IV lorazepam per psychiatry recommendations  Continue to hold all other psychotropics  One-to-one observation  Plan for readmission to [facility] once medically stable

## 2024-07-07 NOTE — PLAN OF CARE
Problem: Prexisting or High Potential for Compromised Skin Integrity  Goal: Skin integrity is maintained or improved  Description: INTERVENTIONS:  - Identify patients at risk for skin breakdown  - Assess and monitor skin integrity  - Assess and monitor nutrition and hydration status  - Monitor labs   - Assess for incontinence   - Turn and reposition patient  - Assist with mobility/ambulation  - Relieve pressure over bony prominences  - Avoid friction and shearing  - Provide appropriate hygiene as needed including keeping skin clean and dry  - Evaluate need for skin moisturizer/barrier cream  - Collaborate with interdisciplinary team   - Patient/family teaching  - Consider wound care consult   Outcome: Progressing     Problem: MOBILITY - ADULT  Goal: Maintain or return to baseline ADL function  Description: INTERVENTIONS:  -  Assess patient's ability to carry out ADLs; assess patient's baseline for ADL function and identify physical deficits which impact ability to perform ADLs (bathing, care of mouth/teeth, toileting, grooming, dressing, etc.)  - Assess/evaluate cause of self-care deficits   - Assess range of motion  - Assess patient's mobility; develop plan if impaired  - Assess patient's need for assistive devices and provide as appropriate  - Encourage maximum independence but intervene and supervise when necessary  - Involve family in performance of ADLs  - Assess for home care needs following discharge   - Consider OT consult to assist with ADL evaluation and planning for discharge  - Provide patient education as appropriate  Outcome: Progressing  Goal: Maintains/Returns to pre admission functional level  Description: INTERVENTIONS:  - Perform AM-PAC 6 Click Basic Mobility/ Daily Activity assessment daily.  - Set and communicate daily mobility goal to care team and patient/family/caregiver.   - Collaborate with rehabilitation services on mobility goals if consulted  - Perform Range of Motion 3 times a day.  -  Reposition patient every 2 hours.  - Dangle patient 2 times a day  - Stand patient 3 times a day  - Ambulate patient 3 times a day  - Out of bed to chair 3 times a day   - Out of bed for meals 3 times a day  - Out of bed for toileting  - Record patient progress and toleration of activity level   Outcome: Progressing     Problem: PAIN - ADULT  Goal: Verbalizes/displays adequate comfort level or baseline comfort level  Description: Interventions:  - Encourage patient to monitor pain and request assistance  - Assess pain using appropriate pain scale  - Administer analgesics based on type and severity of pain and evaluate response  - Implement non-pharmacological measures as appropriate and evaluate response  - Consider cultural and social influences on pain and pain management  - Notify physician/advanced practitioner if interventions unsuccessful or patient reports new pain  Outcome: Progressing     Problem: INFECTION - ADULT  Goal: Absence or prevention of progression during hospitalization  Description: INTERVENTIONS:  - Assess and monitor for signs and symptoms of infection  - Monitor lab/diagnostic results  - Monitor all insertion sites, i.e. indwelling lines, tubes, and drains  - Monitor endotracheal if appropriate and nasal secretions for changes in amount and color  - [place] appropriate cooling/warming therapies per order  - Administer medications as ordered  - Instruct and encourage patient and family to use good hand hygiene technique  - Identify and instruct in appropriate isolation precautions for identified infection/condition  Outcome: Progressing     Problem: SAFETY ADULT  Goal: Maintain or return to baseline ADL function  Description: INTERVENTIONS:  -  Assess patient's ability to carry out ADLs; assess patient's baseline for ADL function and identify physical deficits which impact ability to perform ADLs (bathing, care of mouth/teeth, toileting, grooming, dressing, etc.)  - Assess/evaluate cause of  self-care deficits   - Assess range of motion  - Assess patient's mobility; develop plan if impaired  - Assess patient's need for assistive devices and provide as appropriate  - Encourage maximum independence but intervene and supervise when necessary  - Involve family in performance of ADLs  - Assess for home care needs following discharge   - Consider OT consult to assist with ADL evaluation and planning for discharge  - Provide patient education as appropriate  Outcome: Progressing  Goal: Maintains/Returns to pre admission functional level  Description: INTERVENTIONS:  - Perform AM-PAC 6 Click Basic Mobility/ Daily Activity assessment daily.  - Set and communicate daily mobility goal to care team and patient/family/caregiver.   - Collaborate with rehabilitation services on mobility goals if consulted  - Perform Range of Motion 3 times a day.  - Reposition patient every 2 hours.  - Dangle patient 2 times a day  - Stand patient 3 times a day  - Ambulate patient 3 times a day  - Out of bed to chair 3 times a day   - Out of bed for meals 3 times a day  - Out of bed for toileting  - Record patient progress and toleration of activity level   Outcome: Progressing  Goal: Patient will remain free of falls  Description: INTERVENTIONS:  - Educate patient/family on patient safety including physical limitations  - Instruct patient to call for assistance with activity   - Consult OT/PT to assist with strengthening/mobility   - Keep Call bell within reach  - Keep bed low and locked with side rails adjusted as appropriate  - Keep care items and personal belongings within reach  - Initiate and maintain comfort rounds  - Make Fall Risk Sign visible to staff  - Offer Toileting every 2 Hours, in advance of need  - Initiate/Maintain bed alarm  - Obtain necessary fall risk management equipment:   - Apply yellow socks and bracelet for high fall risk patients  - Consider moving patient to room near nurses station  Outcome: Progressing

## 2024-07-07 NOTE — PROGRESS NOTES
[facility]  Progress Note  Name: Meli Nowak I  MRN: [id number]  Unit/Bed#: [place] [address] -01 I Date of Admission: 7/5/2024   Date of Service: 7/7/2024 I Hospital Day: 2    Assessment & Plan   * Altered mental status  Assessment & Plan  Presenting with fever, rigidity and altered mental status  Differential diagnosis at the time of transfer was possible neuroleptic malignant syndrome versus encephalitis versus CNS event  Mental status still waxing and waning  Start high-dose thiamine  TSH and B12 within normal limits  Lumbar puncture without evidence of bacterial or viral pneumonia  CSF: WBC: 7-discussed with neurology and will order paraneoplastic panel-defer treatment to neurologic service  Possible related to psychiatric illness  MRI of the brain pending, with and without contrast -patient unable to complete screening form.  CT chest abdomen and pelvis with no notable metal.  Dental implants noted on facial x-ray      Psychosis (HCC)  Assessment & Plan  Patient is a 302 at [place] for acute psychotic episode.  She was originally brought to [facility] after [employer] found her confused on a park bench.  She met with crisis and lacked capacity to sign a 201. A 302 was upheld and she was transferred to [facility].  During admission, she was noted semicatatonic and somewhat rigid concerning for NMS versus catatonia versus cholinergic rebound syndrome.  She met SIRS criteria with tachycardia and leukocytosis.  Transferred to [place] for neurologic assessment, LP and MRI to rule encephalitis/meningitis    CTH unremarkable  Lumbar puncture performed-results reviewed, no evidence of bacterial meningitis  Discontinue vancomycin and ceftriaxone, acyclovir  Will reduce Ativan to 1 mg every 4 hours as catatonia improved.  Continue treatment per psychiatry:  IV Ativan 2 mg q4h to address possible catatonia/neurolepetic malignant syndrome in lieu of Zyprexa 5 mg daily, seeing as antipsychotics can worsen  aforementioned syndromes   Do not restart antipsychotic medications, avoid serotonergic or anticholinergic agents  One-to-one observation  Monitor for hyperthermia  Plan for readmission to [facility] once medically stable    Schizoaffective disorder, bipolar type ([facility])  Assessment & Plan  Patient is a 302 at [place] for acute psychotic episode.  PMHx schizoaffective disorder, bipolar type.   Continue treatment with IV lorazepam per psychiatry recommendations  Continue to hold all other psychotropics  One-to-one observation  Plan for readmission to [facility] once medically stable    Acute hypernatremia  Assessment & Plan  Noted with hypernatremia, Na 150 during admission at [place]. Started on IV dextrose infusion  Monitor blood sugars  Improving  Continue D5 over the next 12 hours and likely discontinue tomorrow.  Encourage free water intake  Recent Labs     07/05/24  2240 07/06/24  0610 07/07/24  0517   SODIUM 147 147 143           Rhabdomyolysis  Assessment & Plan  Mild rhabdomyolysis.  CK peaked at 749  Continue IV hydration  CK improveing        SIRS (systemic inflammatory response syndrome) ([facility])  Assessment & Plan  On admission at [place] she met SIRS criteria with leukocytosis and tachycardia  CXR and CT CAP unremarkable  LP negative for bacterial meningitis  Possibly secondary to neuroleptic malignant syndrome    NICHOLAS (acute kidney injury) ([facility])  Assessment & Plan  Recent Labs     07/05/24  2240 07/06/24  0610 07/07/24  0517   CREATININE 0.87 0.87 0.80     Improved back to baseline      Reactive airway disease  Assessment & Plan  Noted at [place] with hypoxia 86-88%.  Improved with 2LNC  Continue home inhalers    Type 2 diabetes mellitus ([facility])  Assessment & Plan  Lab Results   Component Value Date    HGBA1C 6.4 (H) 05/03/2024     Oral metformin on hold.  Monitor Accu-Cheks + sliding scale insulin  ADA diet    Recent Labs     07/06/24  1814 07/07/24  0010 07/07/24  0549 07/07/24  1210   POCGLU 131  103 95 101         Blood Sugar Average: Last 72 hrs:  (P) [id number]                   Hospital Course:     59-year-old female patient transferred from [facility] for neurologic evaluation due to fever, muscle rigidity.  At the time of discharge was concerning for neuroleptic malignant syndrome versus encephalitis versus possible new CNS event.    Patient was transferred for lumbar puncture as well as MRI of the brain    Assessment:      Principal Problem:    Altered mental status  Active Problems:    Psychosis (HCC)    Type 2 diabetes mellitus (HCC)    Reactive airway disease    NICHOLAS (acute kidney injury) (HCC)    SIRS (systemic inflammatory response syndrome) ([facility])    Rhabdomyolysis    Acute hypernatremia    Schizoaffective disorder, bipolar type ([facility])      Plan:    Obtain facial x-ray  Start high-dose thiamine  Recheck labs in a.m.  Discontinue acyclovir per neurology recommendations  Send CSF for paraneoplastic panel       VTE Pharmacologic Prophylaxis:   Pharmacologic: Enoxaparin (Lovenox)  Mechanical VTE Prophylaxis in Place: Yes    AM-PAC Basic Mobility:  Basic Mobility Inpatient Raw Score: 6    -HL Achieved: 2: Bed activities/Dependent transfer  -HL Goal: 2: Bed activities/Dependent transfer    HLM Goal listed above. Continue with multidisciplinary rounding and encourage appropriate mobility to improve upon HLM goals.         Patient Centered Rounds: Case discussed and reviewed with nursing    Discussions with Specialists or Other Care Team Provider: Case management, neurology attending    Education and Discussions with Family / Patient: Family aware of patient's hospitalization    Time Spent for Care: 80 minutes.  More than 50% of total time spent on counseling and coordination of care as described above.    Current Length of Stay: 2 day(s)    Current Patient Status: Inpatient   Certification Statement: The patient will continue to require additional inpatient hospital stay due to  need for MRI, neurologic clearance prior to transfer to [place]    Discharge Plan / Estimated Discharge Date: Pending MRI, anticipate 48 hours    Code Status: Level 1 - Full Code      Subjective:   Seen and examined, patient still confused.  She has to be left alone that she could rest.  No further muscle rigidity no nausea    A complete and comprehensive 14 point organ system review has been performed and all other systems are negative other than stated above.    Objective:     Vitals:   Temp (24hrs), Av.2 °F (37.3 °C), Min:98.8 °F (37.1 °C), Max:99.4 °F (37.4 °C)    Temp:  [98.8 °F (37.1 °C)-99.4 °F (37.4 °C)] 99.4 °F (37.4 °C)  HR:  [78-91] 78  Resp:  [18-20] 18  BP: (147-156)/(74-90) 149/90  SpO2:  [89 %-97 %] 89 %  There is no height or weight on file to calculate BMI.     Input and Output Summary (last 24 hours):       Intake/Output Summary (Last 24 hours) at 2024 1725  Last data filed at 2024 1400  Gross per 24 hour   Intake 230 ml   Output 397 ml   Net -167 ml       Physical Exam:     General: well appearing, no acute distress  HEENT: atraumatic, PERRLA, moist mucosa, normal pharynx, normal tonsils and adenoids, normal tongue, no fluid in sinuses  Neck: Trachea midline, no carotid bruit, no masses  Respiratory: normal chest wall expansion, CTA B, no r/r/w, no rubs  Cardiovascular: RRR, no m/r/g, Normal S1 and S2  Abdomen: Soft, non-tender, non-distended, normal bowel sounds in all quadrants, no hepatosplenomegaly, no tympany  Rectal: deferred  Musculoskeletal: Moves all   integumentary: warm, dry, and pink, with no rash, purpura, or petechia  Heme/Lymph: no lymphadenopathy, no bruises  Neurological: Cranial Nerves II-XII grossly intact  Psychiatric: Flat affect    Additional Data:     Labs:    Results from last 7 days   Lab Units 24  0517 24  0610   WBC Thousand/uL 9.06 9.53   HEMOGLOBIN g/dL 11.5 12.6   HEMATOCRIT % 39.2 42.9   PLATELETS Thousands/uL 228 266   SEGS PCT %  --  64    LYMPHO PCT %  --  22   MONO PCT %  --  12   EOS PCT %  --  0     Results from last 7 days   Lab Units 07/07/24  0517 07/06/24  0610   POTASSIUM mmol/L 3.3* 3.7   CHLORIDE mmol/L 107 112*   CO2 mmol/L 31 30   BUN mg/dL 23 31*   CREATININE mg/dL 0.80 0.87   CALCIUM mg/dL 8.5 8.8   ALK PHOS U/L  --  81   ALT U/L  --  23   AST U/L  --  29     Results from last 7 days   Lab Units 07/05/24  1234   INR  0.90       * I Have Reviewed All Lab Data Listed Above.  * Additional Pertinent Lab Tests Reviewed: All Labs For Current Hospital Admission Reviewed    Imaging:    Imaging Reports Reviewed Today Include: No new imaging  Imaging Personally Reviewed by Myself Includes: No new imaging    Recent Cultures (last 7 days):     Results from last 7 days   Lab Units 07/06/24  1639 07/03/24  2133 07/03/24  1330   BLOOD CULTURE   --   --  No Growth at 72 hrs.  No Growth at 72 hrs.   GRAM STAIN RESULT  1+ Mononuclear Cells  No organisms seen  --   --    URINE CULTURE   --  40,000-49,000 cfu/ml  --        Last 24 Hours Medication List:   Current Facility-Administered Medications   Medication Dose Route Frequency Provider Last Rate    acetaminophen  650 mg Oral Q4H PRN JOSE ELIAS Arreola      albuterol  2 puff Inhalation Q4H PRN JOSE ELIAS Arreola      aluminum-magnesium hydroxide-simethicone  30 mL Oral Q4H PRN JOSE ELIAS Arreola      cyanocobalamin  1,000 mcg Oral Daily JOSE ELIAS Arreola      dextrose  83 mL/hr Intravenous Continuous Girish Vargas, DO      enoxaparin  40 mg Subcutaneous Q24H [facility] Girish Vargas, DO      fluticasone  1 puff Inhalation Daily JOSE ELIAS Arreola      hydrALAZINE  10 mg Intravenous Q6H PRN JOSE ELIAS Arreola      hydrOXYzine HCL  50 mg Oral Q6H PRN Max 4/day JOSE ELIAS Arreola      insulin lispro  1-5 Units Subcutaneous Q6H Girish Vargas, DO      LORazepam  1 mg Intravenous Q4H Girish Vargas, DO      melatonin  3 mg Oral HS JOSE ELIAS Arreola       nicotine polacrilex  4 mg Oral Q2H PRN JOSE ELIAS Arroela      ondansetron  4 mg Oral Q8H PRN JOSE ELIAS Arreola      polyethylene glycol  17 g Oral Daily JOSE ELIAS Arreola      senna  2 tablet Oral HS JOSE ELIAS Arreola      senna-docusate sodium  1 tablet Oral Daily PRN JOSE ELIAS Arreola      thiamine  500 mg Intravenous TID Girish Vargas DO      traZODone  50 mg Oral HS PRN JOSE ELIAS Arreola         AM-PAC Basic Mobility:  Basic Mobility Inpatient Raw Score: 6    JH-HLM Achieved: 2: Bed activities/Dependent transfer  JH-HLM Goal: 2: Bed activities/Dependent transfer    HLM Goal listed above. Continue with multidisciplinary rounding and encourage appropriate mobility to improve upon HLM goals.     Today, Patient Was Seen By: Girish Vargas DO    ** Please Note: This note was completed in part utilizing [employer] software dictation.  Grammatical errors, random word insertions, spelling mistakes, and incomplete sentences may be an occasional consequence of this system secondary to software limitations, ambient noise, and hardware issues.  If you have any questions or concerns about the content, text, or information contained within the body of this dictation, please contact the provider for clarification. **

## 2024-07-07 NOTE — ASSESSMENT & PLAN NOTE
Presenting with fever, rigidity and altered mental status  Differential diagnosis at the time of transfer was possible neuroleptic malignant syndrome versus encephalitis versus CNS event  Mental status still waxing and waning  Start high-dose thiamine  TSH and B12 within normal limits  Lumbar puncture without evidence of bacterial or viral pneumonia  CSF: WBC: 7-discussed with neurology and will order paraneoplastic panel-defer treatment to neurologic service  Possible related to psychiatric illness  MRI of the brain pending, with and without contrast -patient unable to complete screening form.  CT chest abdomen and pelvis with no notable metal.  Dental implants noted on facial x-ray

## 2024-07-07 NOTE — ASSESSMENT & PLAN NOTE
Patient is a 302 at [place] for acute psychotic episode.  She was originally brought to [facility] after [employer] found her confused on a park bench.  She met with crisis and lacked capacity to sign a 201. A 302 was upheld and she was transferred to [facility].  During admission, she was noted semicatatonic and somewhat rigid concerning for NMS versus catatonia versus cholinergic rebound syndrome.  She met SIRS criteria with tachycardia and leukocytosis.  Transferred to [place] for neurologic assessment, LP and MRI to rule encephalitis/meningitis    CTH unremarkable  Lumbar puncture performed-results reviewed, no evidence of bacterial meningitis  Discontinue vancomycin and ceftriaxone, acyclovir  Will reduce Ativan to 1 mg every 4 hours as catatonia improved.  Continue treatment per psychiatry:  IV Ativan 2 mg q4h to address possible catatonia/neurolepetic malignant syndrome in lieu of Zyprexa 5 mg daily, seeing as antipsychotics can worsen aforementioned syndromes   Do not restart antipsychotic medications, avoid serotonergic or anticholinergic agents  One-to-one observation  Monitor for hyperthermia  Plan for readmission to [facility] once medically stable

## 2024-07-07 NOTE — PLAN OF CARE
Problem: DECISION MAKING  Goal: Pt/Family able to effectively weigh alternatives and participate in decision making related to treatment and care  Description: INTERVENTIONS:  - Identify decision maker  - Determine when there are differences among patient's view, family's view, and healthcare provider's view of patient condition and care goals  - Facilitate patient/family articulation of goals for care  - Help patient/family identify pros/cons of alternative solutions  - Provide information as requested by patient/family  - Respect patient/family rights related to privacy and sharing information   - Serve as a liaison between patient, family and health care team  - Initiate consults as appropriate (Ethics Team, Palliative Care, Family Care Conference, etc.)  Outcome: Progressing     Problem: CONFUSION/THOUGHT DISTURBANCE  Goal: Thought disturbances (confusion, delirium, depression, dementia or psychosis) are managed to maintain or return to baseline mental status and functional level  Description: INTERVENTIONS:  - Assess for possible contributors to  thought disturbance, including but not limited to medications, infection, impaired vision or hearing, underlying metabolic abnormalities, dehydration, respiratory compromise,  psychiatric diagnoses and notify attending PHYSICAN/AP  - Monitor and intervene to maintain adequate nutrition, hydration, elimination, sleep and activity  - Decrease environmental stimuli, including noise as appropriate.  - Provide frequent contacts to provide refocusing, direction and reassurance as needed. Approach patient calmly with eye contact and at their level.  - [place] high risk fall precautions, aspiration precautions and other safety measures, as indicated  - If delirium suspected, notify physician/AP of change in condition and request immediate in-person evaluation  - Pursue consults as appropriate including Geriatric (campus dependent), OT for cognitive evaluation/activity  planning, psychiatric, pastoral care, etc.  Outcome: Progressing

## 2024-07-07 NOTE — ASSESSMENT & PLAN NOTE
On admission at [place] she met SIRS criteria with leukocytosis and tachycardia  CXR and CT CAP unremarkable  LP negative for bacterial meningitis  Possibly secondary to neuroleptic malignant syndrome

## 2024-07-07 NOTE — PLAN OF CARE
Problem: Prexisting or High Potential for Compromised Skin Integrity  Goal: Skin integrity is maintained or improved  Description: INTERVENTIONS:  - Identify patients at risk for skin breakdown  - Assess and monitor skin integrity  - Assess and monitor nutrition and hydration status  - Monitor labs   - Assess for incontinence   - Turn and reposition patient  - Assist with mobility/ambulation  - Relieve pressure over bony prominences  - Avoid friction and shearing  - Provide appropriate hygiene as needed including keeping skin clean and dry  - Evaluate need for skin moisturizer/barrier cream  - Collaborate with interdisciplinary team   - Patient/family teaching  - Consider wound care consult   Outcome: Progressing     Problem: MOBILITY - ADULT  Goal: Maintain or return to baseline ADL function  Description: INTERVENTIONS:  -  Assess patient's ability to carry out ADLs; assess patient's baseline for ADL function and identify physical deficits which impact ability to perform ADLs (bathing, care of mouth/teeth, toileting, grooming, dressing, etc.)  - Assess/evaluate cause of self-care deficits   - Assess range of motion  - Assess patient's mobility; develop plan if impaired  - Assess patient's need for assistive devices and provide as appropriate  - Encourage maximum independence but intervene and supervise when necessary  - Involve family in performance of ADLs  - Assess for home care needs following discharge   - Consider OT consult to assist with ADL evaluation and planning for discharge  - Provide patient education as appropriate  Outcome: Progressing  Goal: Maintains/Returns to pre admission functional level  Description: INTERVENTIONS:  - Perform AM-PAC 6 Click Basic Mobility/ Daily Activity assessment daily.  - Set and communicate daily mobility goal to care team and patient/family/caregiver.   - Collaborate with rehabilitation services on mobility goals if consulted  - Perform Range of Motion 3 times a day.  -  Reposition patient every 2 hours.  - Dangle patient 2 times a day  - Stand patient 3 times a day  - Ambulate patient 3 times a day  - Out of bed to chair 3 times a day   - Out of bed for meals 3 times a day  - Out of bed for toileting  - Record patient progress and toleration of activity level   Outcome: Progressing     Problem: PAIN - ADULT  Goal: Verbalizes/displays adequate comfort level or baseline comfort level  Description: Interventions:  - Encourage patient to monitor pain and request assistance  - Assess pain using appropriate pain scale  - Administer analgesics based on type and severity of pain and evaluate response  - Implement non-pharmacological measures as appropriate and evaluate response  - Consider cultural and social influences on pain and pain management  - Notify physician/advanced practitioner if interventions unsuccessful or patient reports new pain  Outcome: Progressing     Problem: INFECTION - ADULT  Goal: Absence or prevention of progression during hospitalization  Description: INTERVENTIONS:  - Assess and monitor for signs and symptoms of infection  - Monitor lab/diagnostic results  - Monitor all insertion sites, i.e. indwelling lines, tubes, and drains  - Monitor endotracheal if appropriate and nasal secretions for changes in amount and color  - [place] appropriate cooling/warming therapies per order  - Administer medications as ordered  - Instruct and encourage patient and family to use good hand hygiene technique  - Identify and instruct in appropriate isolation precautions for identified infection/condition  Outcome: Progressing     Problem: SAFETY ADULT  Goal: Maintain or return to baseline ADL function  Description: INTERVENTIONS:  -  Assess patient's ability to carry out ADLs; assess patient's baseline for ADL function and identify physical deficits which impact ability to perform ADLs (bathing, care of mouth/teeth, toileting, grooming, dressing, etc.)  - Assess/evaluate cause of  self-care deficits   - Assess range of motion  - Assess patient's mobility; develop plan if impaired  - Assess patient's need for assistive devices and provide as appropriate  - Encourage maximum independence but intervene and supervise when necessary  - Involve family in performance of ADLs  - Assess for home care needs following discharge   - Consider OT consult to assist with ADL evaluation and planning for discharge  - Provide patient education as appropriate  Outcome: Progressing  Goal: Maintains/Returns to pre admission functional level  Description: INTERVENTIONS:  - Perform AM-PAC 6 Click Basic Mobility/ Daily Activity assessment daily.  - Set and communicate daily mobility goal to care team and patient/family/caregiver.   - Collaborate with rehabilitation services on mobility goals if consulted  - Perform Range of Motion 3 times a day.  - Reposition patient every 2 hours.  - Dangle patient 2 times a day  - Stand patient 3 times a day  - Ambulate patient 3 times a day  - Out of bed to chair 3 times a day   - Out of bed for meals 3 times a day  - Out of bed for toileting  - Record patient progress and toleration of activity level   Outcome: Progressing  Goal: Patient will remain free of falls  Description: INTERVENTIONS:  - Educate patient/family on patient safety including physical limitations  - Instruct patient to call for assistance with activity   - Consult OT/PT to assist with strengthening/mobility   - Keep Call bell within reach  - Keep bed low and locked with side rails adjusted as appropriate  - Keep care items and personal belongings within reach  - Initiate and maintain comfort rounds  - Make Fall Risk Sign visible to staff  - Apply yellow socks and bracelet for high fall risk patients  - Consider moving patient to room near nurses station  Outcome: Progressing     Problem: DISCHARGE PLANNING  Goal: Discharge to home or other facility with appropriate resources  Description: INTERVENTIONS:  -  Identify barriers to discharge w/patient and caregiver  - Arrange for needed discharge resources and transportation as appropriate  - Identify discharge learning needs (meds, wound care, etc.)  - Arrange for interpretive services to assist at discharge as needed  - Refer to Case Management Department for coordinating discharge planning if the patient needs post-hospital services based on physician/advanced practitioner order or complex needs related to functional status, cognitive ability, or social support system  Outcome: Progressing     Problem: Knowledge Deficit  Goal: Patient/family/caregiver demonstrates understanding of disease process, treatment plan, medications, and discharge instructions  Description: Complete learning assessment and assess knowledge base.  Interventions:  - Provide teaching at level of understanding  - Provide teaching via preferred learning methods  Outcome: Progressing     Problem: Nutrition/Hydration-ADULT  Goal: Nutrient/Hydration intake appropriate for improving, restoring or maintaining nutritional needs  Description: Monitor and assess patient's nutrition/hydration status for malnutrition. Collaborate with interdisciplinary team and initiate plan and interventions as ordered.  Monitor patient's weight and dietary intake as ordered or per policy. Utilize nutrition screening tool and intervene as necessary. Determine patient's food preferences and provide high-protein, high-caloric foods as appropriate.     INTERVENTIONS:  - Monitor oral intake, urinary output, labs, and treatment plans  - Assess nutrition and hydration status and recommend course of action  - Evaluate amount of meals eaten  - Assist patient with eating if necessary   - Allow adequate time for meals  - Recommend/ encourage appropriate diets, oral nutritional supplements, and vitamin/mineral supplements  - Order, calculate, and assess calorie counts as needed  - Recommend, monitor, and adjust tube feedings and  TPN/PPN based on assessed needs  - Assess need for intravenous fluids  - Provide specific nutrition/hydration education as appropriate  - Include patient/family/caregiver in decisions related to nutrition  Outcome: Progressing

## 2024-07-07 NOTE — ASSESSMENT & PLAN NOTE
Noted with hypernatremia, Na 150 during admission at [place]. Started on IV dextrose infusion  Monitor blood sugars  Improving  Continue D5 over the next 12 hours and likely discontinue tomorrow.  Encourage free water intake  Recent Labs     07/05/24  2240 07/06/24  0610 07/07/24  0517   SODIUM 147 147 143

## 2024-07-07 NOTE — ASSESSMENT & PLAN NOTE
Lab Results   Component Value Date    HGBA1C 6.4 (H) 05/03/2024     Oral metformin on hold.  Monitor Accu-Cheks + sliding scale insulin  ADA diet    Recent Labs     07/06/24  1814 07/07/24  0010 07/07/24  0549 07/07/24  1210   POCGLU 131 103 95 101         Blood Sugar Average: Last 72 hrs:  (P) [id number]

## 2024-07-07 NOTE — ASSESSMENT & PLAN NOTE
Recent Labs     07/05/24  2240 07/06/24  0610 07/07/24  0517   CREATININE 0.87 0.87 0.80     Improved back to baseline

## 2024-07-08 ENCOUNTER — APPOINTMENT (INPATIENT)
Dept: NON INVASIVE DIAGNOSTICS | Facility: HOSPITAL | Age: 59
DRG: 682 | End: 2024-07-08
Payer: MEDICARE

## 2024-07-08 LAB
ANION GAP SERPL CALCULATED.3IONS-SCNC: 4 MMOL/L (ref 4–13)
AORTIC ROOT: 3.3 CM
APICAL FOUR CHAMBER EJECTION FRACTION: 66 %
BACTERIA BLD CULT: NORMAL
BACTERIA BLD CULT: NORMAL
BSA FOR ECHO PROCEDURE: 2.22 M2
BUN SERPL-MCNC: 21 MG/DL (ref 5–25)
CALCIUM SERPL-MCNC: 8.7 MG/DL (ref 8.4–10.2)
CHLORIDE SERPL-SCNC: 107 MMOL/L (ref 96–108)
CK SERPL-CCNC: 283 U/L (ref 26–192)
CO2 SERPL-SCNC: 32 MMOL/L (ref 21–32)
CREAT SERPL-MCNC: 0.67 MG/DL (ref 0.6–1.3)
E WAVE DECELERATION TIME: 184 MS
E/A RATIO: 0.64
ERYTHROCYTE [DISTWIDTH] IN BLOOD BY AUTOMATED COUNT: 15.8 % (ref 11.6–15.1)
FRACTIONAL SHORTENING: 32 (ref 28–44)
GFR SERPL CREATININE-BSD FRML MDRD: 97 ML/MIN/1.73SQ M
GLUCOSE SERPL-MCNC: 84 MG/DL (ref 65–140)
GLUCOSE SERPL-MCNC: 88 MG/DL (ref 65–140)
GLUCOSE SERPL-MCNC: 93 MG/DL (ref 65–140)
HCT VFR BLD AUTO: 38.6 % (ref 34.8–46.1)
HGB BLD-MCNC: 11.8 G/DL (ref 11.5–15.4)
INTERVENTRICULAR SEPTUM IN DIASTOLE (PARASTERNAL SHORT AXIS VIEW): 2.4 CM
INTERVENTRICULAR SEPTUM: 2.4 CM (ref 0.6–1.1)
IVC: 9.4 MM
LAAS-AP2: 20.2 CM2
LAAS-AP4: 24.7 CM2
LEFT ATRIUM SIZE: 4.4 CM
LEFT ATRIUM VOLUME (MOD BIPLANE): 68 ML
LEFT ATRIUM VOLUME INDEX (MOD BIPLANE): 30.5 ML/M2
LEFT INTERNAL DIMENSION IN SYSTOLE: 3 CM (ref 2.1–4)
LEFT VENTRICULAR INTERNAL DIMENSION IN DIASTOLE: 4.4 CM (ref 3.5–6)
LEFT VENTRICULAR POSTERIOR WALL IN END DIASTOLE: 0.8 CM
LEFT VENTRICULAR STROKE VOLUME: 50 ML
LVSV (TEICH): 50 ML
MCH RBC QN AUTO: 27.9 PG (ref 26.8–34.3)
MCHC RBC AUTO-ENTMCNC: 30.6 G/DL (ref 31.4–37.4)
MCV RBC AUTO: 91 FL (ref 82–98)
MV E'TISSUE VEL-SEP: 9 CM/S
MV PEAK A VEL: 1.17 M/S
MV PEAK E VEL: 75 CM/S
MV STENOSIS PRESSURE HALF TIME: 53 MS
MV VALVE AREA P 1/2 METHOD: 4.15
PLATELET # BLD AUTO: 223 THOUSANDS/UL (ref 149–390)
PMV BLD AUTO: 9.8 FL (ref 8.9–12.7)
POTASSIUM SERPL-SCNC: 3.2 MMOL/L (ref 3.5–5.3)
RA PRESSURE ESTIMATED: 3 MMHG
RBC # BLD AUTO: 4.23 MILLION/UL (ref 3.81–5.12)
RIGHT ATRIAL 2D VOLUME: 18 ML
RIGHT ATRIUM AREA SYSTOLE A4C: 10.1 CM2
RIGHT VENTRICLE ID DIMENSION: 3.4 CM
SL CV LEFT ATRIUM LENGTH A2C: 6.2 CM
SL CV LV EF: 65
SL CV PED ECHO LEFT VENTRICLE DIASTOLIC VOLUME (MOD BIPLANE) 2D: 86 ML
SL CV PED ECHO LEFT VENTRICLE SYSTOLIC VOLUME (MOD BIPLANE) 2D: 35 ML
SODIUM SERPL-SCNC: 143 MMOL/L (ref 135–147)
TRICUSPID ANNULAR PLANE SYSTOLIC EXCURSION: 2.5 CM
WBC # BLD AUTO: 9.35 THOUSAND/UL (ref 4.31–10.16)

## 2024-07-08 PROCEDURE — 82550 ASSAY OF CK (CPK): CPT | Performed by: INTERNAL MEDICINE

## 2024-07-08 PROCEDURE — 85027 COMPLETE CBC AUTOMATED: CPT | Performed by: INTERNAL MEDICINE

## 2024-07-08 PROCEDURE — 82948 REAGENT STRIP/BLOOD GLUCOSE: CPT

## 2024-07-08 PROCEDURE — 93306 TTE W/DOPPLER COMPLETE: CPT | Performed by: INTERNAL MEDICINE

## 2024-07-08 PROCEDURE — 80048 BASIC METABOLIC PNL TOTAL CA: CPT | Performed by: INTERNAL MEDICINE

## 2024-07-08 PROCEDURE — 99232 SBSQ HOSP IP/OBS MODERATE 35: CPT | Performed by: STUDENT IN AN ORGANIZED HEALTH CARE EDUCATION/TRAINING PROGRAM

## 2024-07-08 PROCEDURE — 93306 TTE W/DOPPLER COMPLETE: CPT

## 2024-07-08 RX ADMIN — LORAZEPAM 1 MG: 2 INJECTION INTRAMUSCULAR; INTRAVENOUS at 21:10

## 2024-07-08 RX ADMIN — CYANOCOBALAMIN TAB 500 MCG 1000 MCG: 500 TAB at 08:17

## 2024-07-08 RX ADMIN — THIAMINE HYDROCHLORIDE 500 MG: 100 INJECTION, SOLUTION INTRAMUSCULAR; INTRAVENOUS at 21:10

## 2024-07-08 RX ADMIN — ENOXAPARIN SODIUM 40 MG: 40 INJECTION SUBCUTANEOUS at 08:18

## 2024-07-08 RX ADMIN — THIAMINE HYDROCHLORIDE 500 MG: 100 INJECTION, SOLUTION INTRAMUSCULAR; INTRAVENOUS at 17:17

## 2024-07-08 RX ADMIN — ACETAMINOPHEN 650 MG: 325 TABLET, FILM COATED ORAL at 08:18

## 2024-07-08 RX ADMIN — LORAZEPAM 1 MG: 2 INJECTION INTRAMUSCULAR; INTRAVENOUS at 00:48

## 2024-07-08 RX ADMIN — POLYETHYLENE GLYCOL 3350 17 G: 17 POWDER, FOR SOLUTION ORAL at 08:18

## 2024-07-08 RX ADMIN — LORAZEPAM 1 MG: 2 INJECTION INTRAMUSCULAR; INTRAVENOUS at 12:05

## 2024-07-08 RX ADMIN — LORAZEPAM 1 MG: 2 INJECTION INTRAMUSCULAR; INTRAVENOUS at 17:17

## 2024-07-08 RX ADMIN — SENNOSIDES 17.2 MG: 8.6 TABLET, FILM COATED ORAL at 21:10

## 2024-07-08 RX ADMIN — MELATONIN 3 MG: 3 TAB ORAL at 21:10

## 2024-07-08 RX ADMIN — LORAZEPAM 1 MG: 2 INJECTION INTRAMUSCULAR; INTRAVENOUS at 08:18

## 2024-07-08 RX ADMIN — LORAZEPAM 1 MG: 2 INJECTION INTRAMUSCULAR; INTRAVENOUS at 04:48

## 2024-07-08 RX ADMIN — THIAMINE HYDROCHLORIDE 500 MG: 100 INJECTION, SOLUTION INTRAMUSCULAR; INTRAVENOUS at 08:17

## 2024-07-08 NOTE — ASSESSMENT & PLAN NOTE
59 year old female presented with fever, rigidity and altered mental status.  LP perfomed: ME negative. Pending paraneoplastic panel, Autoimmune encephalopathy, and Lyme  MRI of the brain pending, with and without contrast -patient unable to complete screening form. CT chest abdomen and pelvis with no notable metal.  Dental implants noted on facial x-ray

## 2024-07-08 NOTE — WOUND OSTOMY CARE
Consult Note - Wound   Meli A She 59 y.o. female MRN: [id number]  Unit/Bed#: [place] [address] -01 Encounter: [id number]      Assessment Findings:   Wound care consulted for 58 yo female admitted to [facility] with AMS. Patient is on 1:1 supervision with soft wrist restraints. Patient agitated during assessment, refusing turning or moving. Left arm with scattered areas of partial thickness skin loss with pink tissue, periwound skin is intact, scant serosanguineous drainage.    Orders listed below and wound care will sign off, call or secure chat with questions. Bedside nurse updated of findings and orders. Photos below and in media.    Plan:   Cleanse left arm with soap and water. Apply Silicone bordered foam, annia T for treatment, and change every other day and PRN soilage/displacement    Wounds:  Wound 07/05/24 Elbow Anterior;Left (Active)   Wound Image   07/08/24 1110   Wound Description Beefy red;Fragile 07/08/24 1113   [place]angie-wound Assessment Clean;Dry;Intact 07/08/24 1113   Wound Length (cm) 4 cm 07/08/24 1113   Wound Width (cm) 5 cm 07/08/24 1113   Wound Depth (cm) 0.1 cm 07/08/24 1113   Wound Surface Area (cm^2) 20 cm^2 07/08/24 1113   Wound Volume (cm^3) 2 cm^3 07/08/24 1113   Calculated Wound Volume (cm^3) 2 cm^3 07/08/24 1113   Wound Site Closure MANJU 07/08/24 1113   Drainage Amount Scant 07/08/24 1113   Drainage Description Serosanguineous 07/08/24 1113   Non-staged Wound Description Partial thickness 07/08/24 1113   Treatments Cleansed 07/08/24 1113   Dressing Foam, Silicon (eg. Allevyn, etc) 07/08/24 1113   Wound packed? No 07/08/24 1113   Packing- # removed 0 07/08/24 1113   Packing- # inserted 0 07/08/24 1113   Dressing Changed New 07/08/24 1113   Patient Tolerance Tolerated well 07/08/24 1113   Dressing Status Clean;Dry;Intact 07/08/24 1113           Dayna Munoz BSN, RN, CWOCN

## 2024-07-08 NOTE — ASSESSMENT & PLAN NOTE
Patient is a 89 at [facility] for acute psychotic episode.  PMHx schizoaffective disorder, bipolar type.   Continue treatment with IV lorazepam per psychiatry recommendations  Continue to hold all other psychotropics  One-to-one observation  Plan for readmission to [facility] once medically stable

## 2024-07-08 NOTE — ASSESSMENT & PLAN NOTE
Resolved, back to baseline    Recent Labs     07/06/24  0610 07/07/24  0517 07/08/24  0546   BUN 31* 23 21   CREATININE 0.87 0.80 0.67   EGFR 74 82 97       Results from last 7 days   Lab Units 07/02/24  0025   BLOOD UA  Negative   PROTEIN UA mg/dl 70 (1+)*

## 2024-07-08 NOTE — ASSESSMENT & PLAN NOTE
Hypernatremic during her admission at [facility].  Resolved with dextrose infusion. Continue free water intake    Recent Labs     07/05/24  2240 07/06/24  0610 07/07/24  0517 07/08/24  0546   SODIUM 147 147 143 143

## 2024-07-08 NOTE — PLAN OF CARE
Problem: Prexisting or High Potential for Compromised Skin Integrity  Goal: Skin integrity is maintained or improved  Description: INTERVENTIONS:  - Identify patients at risk for skin breakdown  - Assess and monitor skin integrity  - Assess and monitor nutrition and hydration status  - Monitor labs   - Assess for incontinence   - Turn and reposition patient  - Assist with mobility/ambulation  - Relieve pressure over bony prominences  - Avoid friction and shearing  - Provide appropriate hygiene as needed including keeping skin clean and dry  - Evaluate need for skin moisturizer/barrier cream  - Collaborate with interdisciplinary team   - Patient/family teaching  - Consider wound care consult   Outcome: Progressing     Problem: MOBILITY - ADULT  Goal: Maintain or return to baseline ADL function  Description: INTERVENTIONS:  -  Assess patient's ability to carry out ADLs; assess patient's baseline for ADL function and identify physical deficits which impact ability to perform ADLs (bathing, care of mouth/teeth, toileting, grooming, dressing, etc.)  - Assess/evaluate cause of self-care deficits   - Assess range of motion  - Assess patient's mobility; develop plan if impaired  - Assess patient's need for assistive devices and provide as appropriate  - Encourage maximum independence but intervene and supervise when necessary  - Involve family in performance of ADLs  - Assess for home care needs following discharge   - Consider OT consult to assist with ADL evaluation and planning for discharge  - Provide patient education as appropriate  Outcome: Progressing  Goal: Maintains/Returns to pre admission functional level  Description: INTERVENTIONS:  - Perform AM-PAC 6 Click Basic Mobility/ Daily Activity assessment daily.  - Set and communicate daily mobility goal to care team and patient/family/caregiver.   - Collaborate with rehabilitation services on mobility goals if consulted  - Perform Range of Motion 3 times a day.  -  Reposition patient every 2 hours.  - Dangle patient 2 times a day  - Stand patient 3 times a day  - Ambulate patient 3 times a day  - Out of bed to chair 3 times a day   - Out of bed for meals 3 times a day  - Out of bed for toileting  - Record patient progress and toleration of activity level   Outcome: Progressing     Problem: PAIN - ADULT  Goal: Verbalizes/displays adequate comfort level or baseline comfort level  Description: Interventions:  - Encourage patient to monitor pain and request assistance  - Assess pain using appropriate pain scale  - Administer analgesics based on type and severity of pain and evaluate response  - Implement non-pharmacological measures as appropriate and evaluate response  - Consider cultural and social influences on pain and pain management  - Notify physician/advanced practitioner if interventions unsuccessful or patient reports new pain  Outcome: Progressing     Problem: INFECTION - ADULT  Goal: Absence or prevention of progression during hospitalization  Description: INTERVENTIONS:  - Assess and monitor for signs and symptoms of infection  - Monitor lab/diagnostic results  - Monitor all insertion sites, i.e. indwelling lines, tubes, and drains  - Monitor endotracheal if appropriate and nasal secretions for changes in amount and color  - [place] appropriate cooling/warming therapies per order  - Administer medications as ordered  - Instruct and encourage patient and family to use good hand hygiene technique  - Identify and instruct in appropriate isolation precautions for identified infection/condition  Outcome: Progressing     Problem: SAFETY ADULT  Goal: Maintain or return to baseline ADL function  Description: INTERVENTIONS:  -  Assess patient's ability to carry out ADLs; assess patient's baseline for ADL function and identify physical deficits which impact ability to perform ADLs (bathing, care of mouth/teeth, toileting, grooming, dressing, etc.)  - Assess/evaluate cause of  self-care deficits   - Assess range of motion  - Assess patient's mobility; develop plan if impaired  - Assess patient's need for assistive devices and provide as appropriate  - Encourage maximum independence but intervene and supervise when necessary  - Involve family in performance of ADLs  - Assess for home care needs following discharge   - Consider OT consult to assist with ADL evaluation and planning for discharge  - Provide patient education as appropriate  Outcome: Progressing  Goal: Maintains/Returns to pre admission functional level  Description: INTERVENTIONS:  - Perform AM-PAC 6 Click Basic Mobility/ Daily Activity assessment daily.  - Set and communicate daily mobility goal to care team and patient/family/caregiver.   - Collaborate with rehabilitation services on mobility goals if consulted  - Perform Range of Motion 3 times a day.  - Reposition patient every 2 hours.  - Dangle patient 2 times a day  - Stand patient 3 times a day  - Ambulate patient 3 times a day  - Out of bed to chair 3 times a day   - Out of bed for meals 3 times a day  - Out of bed for toileting  - Record patient progress and toleration of activity level   Outcome: Progressing  Goal: Patient will remain free of falls  Description: INTERVENTIONS:  - Educate patient/family on patient safety including physical limitations  - Instruct patient to call for assistance with activity   - Consult OT/PT to assist with strengthening/mobility   - Keep Call bell within reach  - Keep bed low and locked with side rails adjusted as appropriate  - Keep care items and personal belongings within reach  - Initiate and maintain comfort rounds  - Make Fall Risk Sign visible to staff  - Offer Toileting every 2 Hours, in advance of need  - Initiate/Maintain bed alarm  - Obtain necessary fall risk management equipment: yellow socks  - Apply yellow socks and bracelet for high fall risk patients  - Consider moving patient to room near nurses station  Outcome:  Progressing     Problem: DISCHARGE PLANNING  Goal: Discharge to home or other facility with appropriate resources  Description: INTERVENTIONS:  - Identify barriers to discharge w/patient and caregiver  - Arrange for needed discharge resources and transportation as appropriate  - Identify discharge learning needs (meds, wound care, etc.)  - Arrange for interpretive services to assist at discharge as needed  - Refer to Case Management Department for coordinating discharge planning if the patient needs post-hospital services based on physician/advanced practitioner order or complex needs related to functional status, cognitive ability, or social support system  Outcome: Progressing     Problem: Knowledge Deficit  Goal: Patient/family/caregiver demonstrates understanding of disease process, treatment plan, medications, and discharge instructions  Description: Complete learning assessment and assess knowledge base.  Interventions:  - Provide teaching at level of understanding  - Provide teaching via preferred learning methods  Outcome: Progressing     Problem: Nutrition/Hydration-ADULT  Goal: Nutrient/Hydration intake appropriate for improving, restoring or maintaining nutritional needs  Description: Monitor and assess patient's nutrition/hydration status for malnutrition. Collaborate with interdisciplinary team and initiate plan and interventions as ordered.  Monitor patient's weight and dietary intake as ordered or per policy. Utilize nutrition screening tool and intervene as necessary. Determine patient's food preferences and provide high-protein, high-caloric foods as appropriate.     INTERVENTIONS:  - Monitor oral intake, urinary output, labs, and treatment plans  - Assess nutrition and hydration status and recommend course of action  - Evaluate amount of meals eaten  - Assist patient with eating if necessary   - Allow adequate time for meals  - Recommend/ encourage appropriate diets, oral nutritional supplements,  and vitamin/mineral supplements  - Order, calculate, and assess calorie counts as needed  - Recommend, monitor, and adjust tube feedings and TPN/PPN based on assessed needs  - Assess need for intravenous fluids  - Provide specific nutrition/hydration education as appropriate  - Include patient/family/caregiver in decisions related to nutrition  Outcome: Progressing     Problem: DECISION MAKING  Goal: Pt/Family able to effectively weigh alternatives and participate in decision making related to treatment and care  Description: INTERVENTIONS:  - Identify decision maker  - Determine when there are differences among patient's view, family's view, and healthcare provider's view of patient condition and care goals  - Facilitate patient/family articulation of goals for care  - Help patient/family identify pros/cons of alternative solutions  - Provide information as requested by patient/family  - Respect patient/family rights related to privacy and sharing information   - Serve as a liaison between patient, family and health care team  - Initiate consults as appropriate (Ethics Team, Palliative Care, Family Care Conference, etc.)  Outcome: Progressing     Problem: CONFUSION/THOUGHT DISTURBANCE  Goal: Thought disturbances (confusion, delirium, depression, dementia or psychosis) are managed to maintain or return to baseline mental status and functional level  Description: INTERVENTIONS:  - Assess for possible contributors to  thought disturbance, including but not limited to medications, infection, impaired vision or hearing, underlying metabolic abnormalities, dehydration, respiratory compromise,  psychiatric diagnoses and notify attending PHYSICAN/AP  - Monitor and intervene to maintain adequate nutrition, hydration, elimination, sleep and activity  - Decrease environmental stimuli, including noise as appropriate.  - Provide frequent contacts to provide refocusing, direction and reassurance as needed. Approach patient  calmly with eye contact and at their level.  - [place] high risk fall precautions, aspiration precautions and other safety measures, as indicated  - If delirium suspected, notify physician/AP of change in condition and request immediate in-person evaluation  - Pursue consults as appropriate including Geriatric (campus dependent), OT for cognitive evaluation/activity planning, psychiatric, pastoral care, etc.  Outcome: Progressing

## 2024-07-08 NOTE — DISCHARGE INSTR - OTHER ORDERS
Cleanse left arm with soap and water. Apply Silicone bordered foam, annia T for treatment, and change every other day and PRN soilage/displacement

## 2024-07-08 NOTE — ASSESSMENT & PLAN NOTE
Improved with hydraion    Results from last 7 days   Lab Units 07/08/24  0546 07/07/24  0838 07/06/24  0610   CK TOTAL U/L 283* 287* 317*

## 2024-07-08 NOTE — PLAN OF CARE
Problem: Prexisting or High Potential for Compromised Skin Integrity  Goal: Skin integrity is maintained or improved  Description: INTERVENTIONS:  - Identify patients at risk for skin breakdown  - Assess and monitor skin integrity  - Assess and monitor nutrition and hydration status  - Monitor labs   - Assess for incontinence   - Turn and reposition patient  - Assist with mobility/ambulation  - Relieve pressure over bony prominences  - Avoid friction and shearing  - Provide appropriate hygiene as needed including keeping skin clean and dry  - Evaluate need for skin moisturizer/barrier cream  - Collaborate with interdisciplinary team   - Patient/family teaching  - Consider wound care consult   Outcome: Progressing     Problem: MOBILITY - ADULT  Goal: Maintain or return to baseline ADL function  Description: INTERVENTIONS:  -  Assess patient's ability to carry out ADLs; assess patient's baseline for ADL function and identify physical deficits which impact ability to perform ADLs (bathing, care of mouth/teeth, toileting, grooming, dressing, etc.)  - Assess/evaluate cause of self-care deficits   - Assess range of motion  - Assess patient's mobility; develop plan if impaired  - Assess patient's need for assistive devices and provide as appropriate  - Encourage maximum independence but intervene and supervise when necessary  - Involve family in performance of ADLs  - Assess for home care needs following discharge   - Consider OT consult to assist with ADL evaluation and planning for discharge  - Provide patient education as appropriate  Outcome: Progressing  Goal: Maintains/Returns to pre admission functional level  Description: INTERVENTIONS:  - Perform AM-PAC 6 Click Basic Mobility/ Daily Activity assessment daily.  - Set and communicate daily mobility goal to care team and patient/family/caregiver.   - Collaborate with rehabilitation services on mobility goals if consulted  - Perform Range of Motion 3 times a day.  -  Reason for call:  Same Day Appointment  Reason for Call:  Same Day Appointment, Requested Provider:  any    PCP: Imer Chambers    Reason for visit: cough fever    Duration of symptoms: fever since last night cough two days    Have you been treated for this in the past? No    Additional comments: is wondering if she could be worked into [facility] [place] today with any provider. Declined another location    Can we leave a detailed message on this number? YES    Phone number patient can be reached at: Home number on file [phone] (home) mom    Best Time: any    Call taken on 2/21/2017 at 9:06 AM by Shannon Pichardo     Reposition patient every 2 hours.  - Dangle patient 2 times a day  - Stand patient 3 times a day  - Ambulate patient 3 times a day  - Out of bed to chair 3 times a day   - Out of bed for meals 3 times a day  - Out of bed for toileting  - Record patient progress and toleration of activity level   Outcome: Progressing     Problem: PAIN - ADULT  Goal: Verbalizes/displays adequate comfort level or baseline comfort level  Description: Interventions:  - Encourage patient to monitor pain and request assistance  - Assess pain using appropriate pain scale  - Administer analgesics based on type and severity of pain and evaluate response  - Implement non-pharmacological measures as appropriate and evaluate response  - Consider cultural and social influences on pain and pain management  - Notify physician/advanced practitioner if interventions unsuccessful or patient reports new pain  Outcome: Progressing     Problem: INFECTION - ADULT  Goal: Absence or prevention of progression during hospitalization  Description: INTERVENTIONS:  - Assess and monitor for signs and symptoms of infection  - Monitor lab/diagnostic results  - Monitor all insertion sites, i.e. indwelling lines, tubes, and drains  - Monitor endotracheal if appropriate and nasal secretions for changes in amount and color  - [place] appropriate cooling/warming therapies per order  - Administer medications as ordered  - Instruct and encourage patient and family to use good hand hygiene technique  - Identify and instruct in appropriate isolation precautions for identified infection/condition  Outcome: Progressing     Problem: SAFETY ADULT  Goal: Maintain or return to baseline ADL function  Description: INTERVENTIONS:  -  Assess patient's ability to carry out ADLs; assess patient's baseline for ADL function and identify physical deficits which impact ability to perform ADLs (bathing, care of mouth/teeth, toileting, grooming, dressing, etc.)  - Assess/evaluate cause of  self-care deficits   - Assess range of motion  - Assess patient's mobility; develop plan if impaired  - Assess patient's need for assistive devices and provide as appropriate  - Encourage maximum independence but intervene and supervise when necessary  - Involve family in performance of ADLs  - Assess for home care needs following discharge   - Consider OT consult to assist with ADL evaluation and planning for discharge  - Provide patient education as appropriate  Outcome: Progressing  Goal: Maintains/Returns to pre admission functional level  Description: INTERVENTIONS:  - Perform AM-PAC 6 Click Basic Mobility/ Daily Activity assessment daily.  - Set and communicate daily mobility goal to care team and patient/family/caregiver.   - Collaborate with rehabilitation services on mobility goals if consulted  - Perform Range of Motion 3 times a day.  - Reposition patient every 2 hours.  - Dangle patient 2 times a day  - Stand patient 3 times a day  - Ambulate patient 3 times a day  - Out of bed to chair 3 times a day   - Out of bed for meals 3 times a day  - Out of bed for toileting  - Record patient progress and toleration of activity level   Outcome: Progressing  Goal: Patient will remain free of falls  Description: INTERVENTIONS:  - Educate patient/family on patient safety including physical limitations  - Instruct patient to call for assistance with activity   - Consult OT/PT to assist with strengthening/mobility   - Keep Call bell within reach  - Keep bed low and locked with side rails adjusted as appropriate  - Keep care items and personal belongings within reach  - Initiate and maintain comfort rounds  - Make Fall Risk Sign visible to staff  - Apply yellow socks and bracelet for high fall risk patients  - Consider moving patient to room near nurses station  Outcome: Progressing     Problem: DISCHARGE PLANNING  Goal: Discharge to home or other facility with appropriate resources  Description: INTERVENTIONS:  -  Identify barriers to discharge w/patient and caregiver  - Arrange for needed discharge resources and transportation as appropriate  - Identify discharge learning needs (meds, wound care, etc.)  - Arrange for interpretive services to assist at discharge as needed  - Refer to Case Management Department for coordinating discharge planning if the patient needs post-hospital services based on physician/advanced practitioner order or complex needs related to functional status, cognitive ability, or social support system  Outcome: Progressing     Problem: Knowledge Deficit  Goal: Patient/family/caregiver demonstrates understanding of disease process, treatment plan, medications, and discharge instructions  Description: Complete learning assessment and assess knowledge base.  Interventions:  - Provide teaching at level of understanding  - Provide teaching via preferred learning methods  Outcome: Progressing     Problem: Nutrition/Hydration-ADULT  Goal: Nutrient/Hydration intake appropriate for improving, restoring or maintaining nutritional needs  Description: Monitor and assess patient's nutrition/hydration status for malnutrition. Collaborate with interdisciplinary team and initiate plan and interventions as ordered.  Monitor patient's weight and dietary intake as ordered or per policy. Utilize nutrition screening tool and intervene as necessary. Determine patient's food preferences and provide high-protein, high-caloric foods as appropriate.     INTERVENTIONS:  - Monitor oral intake, urinary output, labs, and treatment plans  - Assess nutrition and hydration status and recommend course of action  - Evaluate amount of meals eaten  - Assist patient with eating if necessary   - Allow adequate time for meals  - Recommend/ encourage appropriate diets, oral nutritional supplements, and vitamin/mineral supplements  - Order, calculate, and assess calorie counts as needed  - Recommend, monitor, and adjust tube feedings and  TPN/PPN based on assessed needs  - Assess need for intravenous fluids  - Provide specific nutrition/hydration education as appropriate  - Include patient/family/caregiver in decisions related to nutrition  Outcome: Progressing     Problem: DECISION MAKING  Goal: Pt/Family able to effectively weigh alternatives and participate in decision making related to treatment and care  Description: INTERVENTIONS:  - Identify decision maker  - Determine when there are differences among patient's view, family's view, and healthcare provider's view of patient condition and care goals  - Facilitate patient/family articulation of goals for care  - Help patient/family identify pros/cons of alternative solutions  - Provide information as requested by patient/family  - Respect patient/family rights related to privacy and sharing information   - Serve as a liaison between patient, family and health care team  - Initiate consults as appropriate (Ethics Team, Palliative Care, Family Care Conference, etc.)  Outcome: Progressing     Problem: CONFUSION/THOUGHT DISTURBANCE  Goal: Thought disturbances (confusion, delirium, depression, dementia or psychosis) are managed to maintain or return to baseline mental status and functional level  Description: INTERVENTIONS:  - Assess for possible contributors to  thought disturbance, including but not limited to medications, infection, impaired vision or hearing, underlying metabolic abnormalities, dehydration, respiratory compromise,  psychiatric diagnoses and notify attending PHYSICAN/AP  - Monitor and intervene to maintain adequate nutrition, hydration, elimination, sleep and activity  - Decrease environmental stimuli, including noise as appropriate.  - Provide frequent contacts to provide refocusing, direction and reassurance as needed. Approach patient calmly with eye contact and at their level.  - [place] high risk fall precautions, aspiration precautions and other safety measures, as  indicated  - If delirium suspected, notify physician/AP of change in condition and request immediate in-person evaluation  - Pursue consults as appropriate including Geriatric (campus dependent), OT for cognitive evaluation/activity planning, psychiatric, pastoral care, etc.  Outcome: Progressing

## 2024-07-08 NOTE — ASSESSMENT & PLAN NOTE
Lab Results   Component Value Date    HGBA1C 6.4 (H) 05/03/2024     Recent Labs     07/07/24  0549 07/07/24  1210 07/08/24  0043 07/08/24  0548   POCGLU 95 101 93 84       Blood Sugar Average: Last 72 hrs:  (P) [id number]    Hold Metformin  Sliding scale

## 2024-07-08 NOTE — PROGRESS NOTES
[facility]  Progress Note  Name: Meli Nowak I  MRN: [id number]  Unit/Bed#: [place] [address] -01 I Date of Admission: 7/5/2024   Date of Service: 7/8/2024 I Hospital Day: 3    Assessment & Plan   * Altered mental status  Assessment & Plan  59 year old female presented with fever, rigidity and altered mental status.  LP perfomed: ME negative. Pending paraneoplastic panel, Autoimmune encephalopathy, and Lyme  MRI of the brain pending, with and without contrast -patient unable to complete screening form. CT chest abdomen and pelvis with no notable metal.  Dental implants noted on facial x-ray      Schizoaffective disorder, bipolar type ([facility])  Assessment & Plan  Patient is a 89 at [place] for acute psychotic episode.  PMHx schizoaffective disorder, bipolar type.   Continue treatment with IV lorazepam per psychiatry recommendations  Continue to hold all other psychotropics  One-to-one observation  Plan for readmission to [facility] once medically stable    Acute hypernatremia  Assessment & Plan  Hypernatremic during her admission at [place].  Resolved with dextrose infusion. Continue free water intake    Recent Labs     07/05/24  2240 07/06/24  0610 07/07/24  0517 07/08/24  0546   SODIUM 147 147 143 143       Rhabdomyolysis  Assessment & Plan  Improved with hydraion    Results from last 7 days   Lab Units 07/08/24  0546 07/07/24  0838 07/06/24  0610   CK TOTAL U/L 283* 287* 317*       NICHOLAS (acute kidney injury) ([facility])  Assessment & Plan  Resolved, back to baseline    Recent Labs     07/06/24  0610 07/07/24  0517 07/08/24  0546   BUN 31* 23 21   CREATININE 0.87 0.80 0.67   EGFR 74 82 97       Results from last 7 days   Lab Units 07/02/24  0025   BLOOD UA  Negative   PROTEIN UA mg/dl 70 (1+)*         Reactive airway disease  Assessment & Plan  Noted at [place] with hypoxia 86-88%.  Improved with 2LNC  Continue current regimen    Type 2 diabetes mellitus ([facility])  Assessment & Plan  Lab  Results   Component Value Date    HGBA1C 6.4 (H) 2024     Recent Labs     24  0549 24  1210 24  0043 24  0548   POCGLU 95 101 93 84       Blood Sugar Average: Last 72 hrs:  (P) 99.5231478414984767    Hold Metformin  Sliding scale           VTE Pharmacologic Prophylaxis:   Pharmacologic: Enoxaparin (Lovenox)  Mechanical VTE Prophylaxis in Place: Yes    Discussions with Specialists or Other Care Team Provider: nursing    Education and Discussions with Family / Patient: patient    Current Length of Stay: 3 day(s)    Current Patient Status: Inpatient   Certification Statement: The patient will continue to require additional inpatient hospital stay due to MRI, psych placement    Discharge Plan: 48 hours    Code Status: Level 1 - Full Code      Subjective:   Patient seen and examined at bedside. Started yelling at me when I attempted to get closer. Refused physical exam.    Objective:     Vitals:   Temp (24hrs), Av.7 °F (37.6 °C), Min:99.7 °F (37.6 °C), Max:99.7 °F (37.6 °C)    Temp:  [99.7 °F (37.6 °C)] 99.7 °F (37.6 °C)  HR:  [72] 72  Resp:  [20] 20  BP: (150-156)/(88-91) 156/88  SpO2:  [89 %-93 %] 93 %  Body mass index is 37.1 kg/m².     Input and Output Summary (last 24 hours):       Intake/Output Summary (Last 24 hours) at 2024 1850  Last data filed at 2024 0940  Gross per 24 hour   Intake 600 ml   Output --   Net 600 ml       Physical Exam:     Physical Exam  Vitals reviewed.   Constitutional:       Appearance: She is obese. She is not ill-appearing.   HENT:      Head: Normocephalic.      Nose: Nose normal.      Mouth/Throat:      Mouth: Mucous membranes are moist.   Eyes:      General: No scleral icterus.  Pulmonary:      Effort: Pulmonary effort is normal.   Neurological:      Mental Status: She is alert.   Psychiatric:      Comments: Agitated, restrained       Additional Data:     Labs:    Results from last 7 days   Lab Units 24  0546 24  0517 24  0610    WBC Thousand/uL 9.35   < > 9.53   HEMOGLOBIN g/dL 11.8   < > 12.6   HEMATOCRIT % 38.6   < > 42.9   PLATELETS Thousands/uL 223   < > 266   SEGS PCT %  --   --  64   LYMPHO PCT %  --   --  22   MONO PCT %  --   --  12   EOS PCT %  --   --  0    < > = values in this interval not displayed.     Results from last 7 days   Lab Units 07/08/24  0546 07/07/24  0517 07/06/24  0610   SODIUM mmol/L 143   < > 147   POTASSIUM mmol/L 3.2*   < > 3.7   CHLORIDE mmol/L 107   < > 112*   CO2 mmol/L 32   < > 30   BUN mg/dL 21   < > 31*   CREATININE mg/dL 0.67   < > 0.87   ANION GAP mmol/L 4   < > 5   CALCIUM mg/dL 8.7   < > 8.8   ALBUMIN g/dL  --   --  4.1   TOTAL BILIRUBIN mg/dL  --   --  0.44   ALK PHOS U/L  --   --  81   ALT U/L  --   --  23   AST U/L  --   --  29   GLUCOSE RANDOM mg/dL 88   < > 104    < > = values in this interval not displayed.     Results from last 7 days   Lab Units 07/05/24  1234   INR  0.90     Results from last 7 days   Lab Units 07/08/24  0548 07/08/24  0043 07/07/24  1210 07/07/24  0549 07/07/24  0010 07/06/24  1814 07/06/24  1156 07/06/24  0424 07/05/24  2357 07/05/24  2232 07/05/24  1945 07/05/24  1548   POC GLUCOSE mg/dl 84 93 101 95 103 131 97 98 111 92 86 90         Results from last 7 days   Lab Units 07/05/24  0554 07/04/24  0529 07/03/24  1715   LACTIC ACID mmol/L  --   --  0.8   PROCALCITONIN ng/ml 0.10 0.21 0.26*           * I Have Reviewed All Lab Data Listed Above.  * Additional Pertinent Lab Tests Reviewed: All Labs For Current Hospital Admission Reviewed    Mobility:  Basic Mobility Inpatient Raw Score: 6  -[facility] Goal: 2: Bed activities/Dependent transfer  [facility] Achieved: 1: Laying in bed    Lines:   Invasive Devices       Peripheral Intravenous Line  Duration             Peripheral IV 07/05/24 Dorsal (posterior);Right Forearm 2 days                       Imaging:    Imaging Reports Reviewed Today Include: XR followup    Recent Cultures (last 7 days):     Results from last 7 days   Lab Units  07/06/24  1639 07/03/24  2133 07/03/24  1330   BLOOD CULTURE   --   --  No Growth After 4 Days.  No Growth After 4 Days.   GRAM STAIN RESULT  1+ Mononuclear Cells  No organisms seen  --   --    URINE CULTURE   --  40,000-49,000 cfu/ml  --        Last 24 Hours Medication List:   Current Facility-Administered Medications   Medication Dose Route Frequency Provider Last Rate    acetaminophen  650 mg Oral Q4H PRN JOSE ELIAS Arreola      albuterol  2 puff Inhalation Q4H PRN JOSE ELIAS Arreola      aluminum-magnesium hydroxide-simethicone  30 mL Oral Q4H PRN JOSE ELIAS Arreola      cyanocobalamin  1,000 mcg Oral Daily JOSE ELIAS Arreola      enoxaparin  40 mg Subcutaneous Q24H JONATHAN Girish Vargas, DO      fluticasone  1 puff Inhalation Daily JOSE ELIAS Arreola      hydrALAZINE  10 mg Intravenous Q6H PRN Jessi Weiner, JOSE ELIAS      hydrOXYzine HCL  50 mg Oral Q6H PRN Max 4/day JOSE ELIAS Arreola      insulin lispro  1-5 Units Subcutaneous Q6H Girish Vargas, DO      LORazepam  1 mg Intravenous Q4H Girish Vargas, DO      melatonin  3 mg Oral HS JOSE ELIAS Arreola      nicotine polacrilex  4 mg Oral Q2H PRN JOSE ELIAS Arreola      ondansetron  4 mg Oral Q8H PRN JOSE ELIAS Arreola      polyethylene glycol  17 g Oral Daily JOSE ELIAS Arreola      senna  2 tablet Oral HS JOSE ELIAS Arreola      senna-docusate sodium  1 tablet Oral Daily PRN JOSE ELIAS Arreola      thiamine  500 mg Intravenous TID Girish Vargas,  mg (07/08/24 1717)    traZODone  50 mg Oral HS PRN JOSE ELIAS Arreola          Today, Patient Was Seen By: Tip Jama MD    ** Please Note: Dictation voice to text software may have been used in the creation of this document. **

## 2024-07-09 ENCOUNTER — APPOINTMENT (INPATIENT)
Dept: MRI IMAGING | Facility: HOSPITAL | Age: 59
DRG: 682 | End: 2024-07-09
Payer: MEDICARE

## 2024-07-09 LAB
ANION GAP SERPL CALCULATED.3IONS-SCNC: 4 MMOL/L (ref 4–13)
BUN SERPL-MCNC: 24 MG/DL (ref 5–25)
CALCIUM SERPL-MCNC: 8.2 MG/DL (ref 8.4–10.2)
CHLORIDE SERPL-SCNC: 109 MMOL/L (ref 96–108)
CO2 SERPL-SCNC: 30 MMOL/L (ref 21–32)
CREAT SERPL-MCNC: 0.75 MG/DL (ref 0.6–1.3)
ERYTHROCYTE [DISTWIDTH] IN BLOOD BY AUTOMATED COUNT: 15.9 % (ref 11.6–15.1)
GFR SERPL CREATININE-BSD FRML MDRD: 88 ML/MIN/1.73SQ M
GLUCOSE SERPL-MCNC: 77 MG/DL (ref 65–140)
GLUCOSE SERPL-MCNC: 84 MG/DL (ref 65–140)
GLUCOSE SERPL-MCNC: 85 MG/DL (ref 65–140)
GLUCOSE SERPL-MCNC: 85 MG/DL (ref 65–140)
HCT VFR BLD AUTO: 40.1 % (ref 34.8–46.1)
HGB BLD-MCNC: 12.3 G/DL (ref 11.5–15.4)
MCH RBC QN AUTO: 28.5 PG (ref 26.8–34.3)
MCHC RBC AUTO-ENTMCNC: 30.7 G/DL (ref 31.4–37.4)
MCV RBC AUTO: 93 FL (ref 82–98)
PLATELET # BLD AUTO: 230 THOUSANDS/UL (ref 149–390)
PMV BLD AUTO: 10 FL (ref 8.9–12.7)
POTASSIUM SERPL-SCNC: 3.5 MMOL/L (ref 3.5–5.3)
RBC # BLD AUTO: 4.31 MILLION/UL (ref 3.81–5.12)
SODIUM SERPL-SCNC: 143 MMOL/L (ref 135–147)
WBC # BLD AUTO: 9.46 THOUSAND/UL (ref 4.31–10.16)

## 2024-07-09 PROCEDURE — 99232 SBSQ HOSP IP/OBS MODERATE 35: CPT | Performed by: STUDENT IN AN ORGANIZED HEALTH CARE EDUCATION/TRAINING PROGRAM

## 2024-07-09 PROCEDURE — 70553 MRI BRAIN STEM W/O & W/DYE: CPT

## 2024-07-09 PROCEDURE — A9585 GADOBUTROL INJECTION: HCPCS | Performed by: STUDENT IN AN ORGANIZED HEALTH CARE EDUCATION/TRAINING PROGRAM

## 2024-07-09 PROCEDURE — 85027 COMPLETE CBC AUTOMATED: CPT | Performed by: INTERNAL MEDICINE

## 2024-07-09 PROCEDURE — 80048 BASIC METABOLIC PNL TOTAL CA: CPT | Performed by: INTERNAL MEDICINE

## 2024-07-09 PROCEDURE — 82948 REAGENT STRIP/BLOOD GLUCOSE: CPT

## 2024-07-09 RX ORDER — GADOBUTROL 604.72 MG/ML
10 INJECTION INTRAVENOUS
Status: COMPLETED | OUTPATIENT
Start: 2024-07-09 | End: 2024-07-09

## 2024-07-09 RX ADMIN — HYDRALAZINE HYDROCHLORIDE 10 MG: 20 INJECTION, SOLUTION INTRAMUSCULAR; INTRAVENOUS at 23:25

## 2024-07-09 RX ADMIN — ACETAMINOPHEN 650 MG: 325 TABLET, FILM COATED ORAL at 12:42

## 2024-07-09 RX ADMIN — ENOXAPARIN SODIUM 40 MG: 40 INJECTION SUBCUTANEOUS at 08:15

## 2024-07-09 RX ADMIN — THIAMINE HYDROCHLORIDE 500 MG: 100 INJECTION, SOLUTION INTRAMUSCULAR; INTRAVENOUS at 08:14

## 2024-07-09 RX ADMIN — LORAZEPAM 1 MG: 2 INJECTION INTRAMUSCULAR; INTRAVENOUS at 16:30

## 2024-07-09 RX ADMIN — LORAZEPAM 1 MG: 2 INJECTION INTRAMUSCULAR; INTRAVENOUS at 08:15

## 2024-07-09 RX ADMIN — LORAZEPAM 1 MG: 2 INJECTION INTRAMUSCULAR; INTRAVENOUS at 04:36

## 2024-07-09 RX ADMIN — LORAZEPAM 1 MG: 2 INJECTION INTRAMUSCULAR; INTRAVENOUS at 11:29

## 2024-07-09 RX ADMIN — LORAZEPAM 1 MG: 2 INJECTION INTRAMUSCULAR; INTRAVENOUS at 00:15

## 2024-07-09 RX ADMIN — LORAZEPAM 1 MG: 2 INJECTION INTRAMUSCULAR; INTRAVENOUS at 20:57

## 2024-07-09 RX ADMIN — POLYETHYLENE GLYCOL 3350 17 G: 17 POWDER, FOR SOLUTION ORAL at 08:16

## 2024-07-09 RX ADMIN — CYANOCOBALAMIN TAB 500 MCG 1000 MCG: 500 TAB at 08:14

## 2024-07-09 RX ADMIN — THIAMINE HYDROCHLORIDE 500 MG: 100 INJECTION, SOLUTION INTRAMUSCULAR; INTRAVENOUS at 20:57

## 2024-07-09 RX ADMIN — HYDRALAZINE HYDROCHLORIDE 10 MG: 20 INJECTION, SOLUTION INTRAMUSCULAR; INTRAVENOUS at 08:16

## 2024-07-09 RX ADMIN — GADOBUTROL 10 ML: 604.72 INJECTION INTRAVENOUS at 12:23

## 2024-07-09 RX ADMIN — FLUTICASONE FUROATE 1 PUFF: 100 POWDER RESPIRATORY (INHALATION) at 08:15

## 2024-07-09 NOTE — ASSESSMENT & PLAN NOTE
Resolved, back to baseline    Recent Labs     07/07/24  0517 07/08/24  0546 07/09/24  0807   BUN 23 21 24   CREATININE 0.80 0.67 0.75   EGFR 82 97 88

## 2024-07-09 NOTE — ASSESSMENT & PLAN NOTE
Patient is a 89 at [place] for acute psychotic episode.  PMHx schizoaffective disorder, bipolar type.   Continue treatment with IV lorazepam per psychiatry recommendations  Continue to hold all other psychotropics  One-to-one observation  Plan for readmission to [facility] once medically stable

## 2024-07-09 NOTE — PROGRESS NOTES
[facility]  Progress Note  Name: Meli Nowak I  MRN: [id number]  Unit/Bed#: [place] [address] -01 I Date of Admission: 7/5/2024   Date of Service: 7/9/2024 I Hospital Day: 4    Assessment & Plan   * Altered mental status  Assessment & Plan  59 year old female presented with fever, rigidity and altered mental status.  LP perfomed: ME negative. Pending paraneoplastic panel, Autoimmune encephalopathy, and Lyme  MRI of the brain pending. Awaiting final read. Neurology evaluation pending MRI read.      Schizoaffective disorder, bipolar type (HCC)  Assessment & Plan  Patient is a 302 at [facility] for acute psychotic episode.  PMHx schizoaffective disorder, bipolar type.   Continue treatment with IV lorazepam per psychiatry recommendations  Continue to hold all other psychotropics  One-to-one observation  Plan for readmission to [facility] once medically stable    Acute hypernatremia  Assessment & Plan  Hypernatremic during her admission at [facility].  Resolved with dextrose infusion. Continue free water intake    Recent Labs     07/07/24  0517 07/08/24  0546 07/09/24  0807   SODIUM 143 143 143         Rhabdomyolysis  Assessment & Plan  Improved with hydraion    Results from last 7 days   Lab Units 07/08/24  0546 07/07/24  0838 07/06/24  0610   CK TOTAL U/L 283* 287* 317*         NICHOLAS (acute kidney injury) ([facility])  Assessment & Plan  Resolved, back to baseline    Recent Labs     07/07/24  0517 07/08/24  0546 07/09/24  0807   BUN 23 21 24   CREATININE 0.80 0.67 0.75   EGFR 82 97 88                   Type 2 diabetes mellitus (HCC)  Assessment & Plan  Lab Results   Component Value Date    HGBA1C 6.4 (H) 05/03/2024     Recent Labs     07/08/24  0043 07/08/24  0548 07/09/24  0533 07/09/24  1246   POCGLU 93 84 85 85       Blood Sugar Average: Last 72 hrs:  (P) 97.2    Hold Metformin  Sliding scale           VTE Pharmacologic Prophylaxis:   Pharmacologic: Enoxaparin (Lovenox)  Mechanical VTE Prophylaxis  in Place: Yes    Discussions with Specialists or Other Care Team Provider: nursing    Education and Discussions with Family / Patient: Darling    Current Length of Stay: 4 day(s)    Current Patient Status: Inpatient   Certification Statement: The patient will continue to require additional inpatient hospital stay due to MRI, neuro reeavl,a waiting safe discharge plan    Discharge Plan: 24 hours    Code Status: Level 1 - Full Code      Subjective:   Patient seen and examined at bedside. She appears calm and relaxed today compared to yesterday.     Objective:     Vitals:   Temp (24hrs), Av.8 °F (37.7 °C), Min:99.4 °F (37.4 °C), Max:100.2 °F (37.9 °C)    Temp:  [99.4 °F (37.4 °C)-100.2 °F (37.9 °C)] 100.2 °F (37.9 °C)  HR:  [73-83] 83  Resp:  [16-17] 16  BP: (156-176)/(84-87) 156/84  SpO2:  [88 %-94 %] 88 %  Body mass index is 37.1 kg/m².     Input and Output Summary (last 24 hours):       Intake/Output Summary (Last 24 hours) at 2024 1348  Last data filed at 2024 2110  Gross per 24 hour   Intake 480 ml   Output --   Net 480 ml       Physical Exam:     Physical Exam  Vitals reviewed.   Constitutional:       General: She is not in acute distress.  HENT:      Head: Normocephalic.      Nose: Nose normal.      Mouth/Throat:      Mouth: Mucous membranes are moist.   Eyes:      General: No scleral icterus.  Cardiovascular:      Rate and Rhythm: Normal rate.   Pulmonary:      Effort: Pulmonary effort is normal. No respiratory distress.   Abdominal:      Palpations: Abdomen is soft.      Tenderness: There is no abdominal tenderness.   Skin:     General: Skin is warm.   Neurological:      Mental Status: She is alert. Mental status is at baseline.       Additional Data:     Labs:    Results from last 7 days   Lab Units 24  0601 24  0517 24  0610   WBC Thousand/uL 9.46   < > 9.53   HEMOGLOBIN g/dL 12.3   < > 12.6   HEMATOCRIT % 40.1   < > 42.9   PLATELETS Thousands/uL 230   < > 266   SEGS PCT %  --    --  64   LYMPHO PCT %  --   --  22   MONO PCT %  --   --  12   EOS PCT %  --   --  0    < > = values in this interval not displayed.     Results from last 7 days   Lab Units 07/09/24  0807 07/07/24  0517 07/06/24  0610   SODIUM mmol/L 143   < > 147   POTASSIUM mmol/L 3.5   < > 3.7   CHLORIDE mmol/L 109*   < > 112*   CO2 mmol/L 30   < > 30   BUN mg/dL 24   < > 31*   CREATININE mg/dL 0.75   < > 0.87   ANION GAP mmol/L 4   < > 5   CALCIUM mg/dL 8.2*   < > 8.8   ALBUMIN g/dL  --   --  4.1   TOTAL BILIRUBIN mg/dL  --   --  0.44   ALK PHOS U/L  --   --  81   ALT U/L  --   --  23   AST U/L  --   --  29   GLUCOSE RANDOM mg/dL 84   < > 104    < > = values in this interval not displayed.     Results from last 7 days   Lab Units 07/05/24  1234   INR  0.90     Results from last 7 days   Lab Units 07/09/24  1246 07/09/24  0533 07/08/24  0548 07/08/24  0043 07/07/24  1210 07/07/24  0549 07/07/24  0010 07/06/24  1814 07/06/24  1156 07/06/24  0424 07/05/24  2357 07/05/24  2232   POC GLUCOSE mg/dl 85 85 84 93 101 95 103 131 97 98 111 92         Results from last 7 days   Lab Units 07/05/24  0554 07/04/24  0529 07/03/24  1715   LACTIC ACID mmol/L  --   --  0.8   PROCALCITONIN ng/ml 0.10 0.21 0.26*           * I Have Reviewed All Lab Data Listed Above.  * Additional Pertinent Lab Tests Reviewed: All Labs For Current Hospital Admission Reviewed    Mobility:  Basic Mobility Inpatient Raw Score: 8  -[facility] Goal: 3: Sit at edge of bed  [facility] Achieved: 2: Bed activities/Dependent transfer    Lines:   Invasive Devices       Peripheral Intravenous Line  Duration             Peripheral IV 07/09/24 Proximal;Right;Ventral (anterior) Forearm <1 day                       Imaging:    Imaging Reports Reviewed Today Include: awaiting mri brain    Recent Cultures (last 7 days):     Results from last 7 days   Lab Units 07/06/24  1639 07/03/24  2133 07/03/24  1330   BLOOD CULTURE   --   --  No Growth After 5 Days.  No Growth After 5 Days.   GRAM STAIN  RESULT  1+ Mononuclear Cells  No organisms seen  --   --    URINE CULTURE   --  40,000-49,000 cfu/ml  --        Last 24 Hours Medication List:   Current Facility-Administered Medications   Medication Dose Route Frequency Provider Last Rate    acetaminophen  650 mg Oral Q4H PRN JOSE ELIAS Arreola      albuterol  2 puff Inhalation Q4H PRN Jessi Weiner, JOSE ELIAS      aluminum-magnesium hydroxide-simethicone  30 mL Oral Q4H PRN Jessi Weiner, JOSE ELIAS      cyanocobalamin  1,000 mcg Oral Daily Jessi Weiner, JOSE ELIAS      enoxaparin  40 mg Subcutaneous Q24H [facility] Girish Vargas, DO      fluticasone  1 puff Inhalation Daily JOSE ELIAS Arreola      hydrALAZINE  10 mg Intravenous Q6H PRN Jessi Weiner, JOSE ELIAS      hydrOXYzine HCL  50 mg Oral Q6H PRN Max 4/day JOSE ELIAS Arreola      insulin lispro  1-5 Units Subcutaneous Q6H Girish Vargas, DO      LORazepam  1 mg Intravenous Q4H Girish Vargas, DO      melatonin  3 mg Oral HS JOSE ELIAS Arreola      nicotine polacrilex  4 mg Oral Q2H PRN Jessi Weiner, JOSE ELIAS      ondansetron  4 mg Oral Q8H PRN JOSE ELIAS Arreola      polyethylene glycol  17 g Oral Daily JOSE ELIAS Arreola      senna  2 tablet Oral HS Jessi Weiner, JOSE ELIAS      senna-docusate sodium  1 tablet Oral Daily PRN JOSE ELIAS Arreola      thiamine  500 mg Intravenous TID Girish Vargas, DO Stopped (07/09/24 0844)    traZODone  50 mg Oral HS PRN JOSE ELIAS Arreola          Today, Patient Was Seen By: Tip Jama MD    ** Please Note: Dictation voice to text software may have been used in the creation of this document. **

## 2024-07-09 NOTE — PLAN OF CARE
Problem: Prexisting or High Potential for Compromised Skin Integrity  Goal: Skin integrity is maintained or improved  Description: INTERVENTIONS:  - Identify patients at risk for skin breakdown  - Assess and monitor skin integrity  - Assess and monitor nutrition and hydration status  - Monitor labs   - Assess for incontinence   - Turn and reposition patient  - Assist with mobility/ambulation  - Relieve pressure over bony prominences  - Avoid friction and shearing  - Provide appropriate hygiene as needed including keeping skin clean and dry  - Evaluate need for skin moisturizer/barrier cream  - Collaborate with interdisciplinary team   - Patient/family teaching  - Consider wound care consult   Outcome: Progressing     Problem: MOBILITY - ADULT  Goal: Maintain or return to baseline ADL function  Description: INTERVENTIONS:  -  Assess patient's ability to carry out ADLs; assess patient's baseline for ADL function and identify physical deficits which impact ability to perform ADLs (bathing, care of mouth/teeth, toileting, grooming, dressing, etc.)  - Assess/evaluate cause of self-care deficits   - Assess range of motion  - Assess patient's mobility; develop plan if impaired  - Assess patient's need for assistive devices and provide as appropriate  - Encourage maximum independence but intervene and supervise when necessary  - Involve family in performance of ADLs  - Assess for home care needs following discharge   - Consider OT consult to assist with ADL evaluation and planning for discharge  - Provide patient education as appropriate  Outcome: Progressing  Goal: Maintains/Returns to pre admission functional level  Description: INTERVENTIONS:  - Perform AM-PAC 6 Click Basic Mobility/ Daily Activity assessment daily.  - Set and communicate daily mobility goal to care team and patient/family/caregiver.   - Collaborate with rehabilitation services on mobility goals if consulted  - Perform Range of Motion 3 times a day.  -  Reposition patient every 2 hours.  - Dangle patient 2 times a day  - Stand patient 3 times a day  - Ambulate patient 3 times a day  - Out of bed to chair 3 times a day   - Out of bed for meals 3 times a day  - Out of bed for toileting  - Record patient progress and toleration of activity level   Outcome: Progressing     Problem: PAIN - ADULT  Goal: Verbalizes/displays adequate comfort level or baseline comfort level  Description: Interventions:  - Encourage patient to monitor pain and request assistance  - Assess pain using appropriate pain scale  - Administer analgesics based on type and severity of pain and evaluate response  - Implement non-pharmacological measures as appropriate and evaluate response  - Consider cultural and social influences on pain and pain management  - Notify physician/advanced practitioner if interventions unsuccessful or patient reports new pain  Outcome: Progressing     Problem: INFECTION - ADULT  Goal: Absence or prevention of progression during hospitalization  Description: INTERVENTIONS:  - Assess and monitor for signs and symptoms of infection  - Monitor lab/diagnostic results  - Monitor all insertion sites, i.e. indwelling lines, tubes, and drains  - Monitor endotracheal if appropriate and nasal secretions for changes in amount and color  - [place] appropriate cooling/warming therapies per order  - Administer medications as ordered  - Instruct and encourage patient and family to use good hand hygiene technique  - Identify and instruct in appropriate isolation precautions for identified infection/condition  Outcome: Progressing     Problem: SAFETY ADULT  Goal: Maintain or return to baseline ADL function  Description: INTERVENTIONS:  -  Assess patient's ability to carry out ADLs; assess patient's baseline for ADL function and identify physical deficits which impact ability to perform ADLs (bathing, care of mouth/teeth, toileting, grooming, dressing, etc.)  - Assess/evaluate cause of  self-care deficits   - Assess range of motion  - Assess patient's mobility; develop plan if impaired  - Assess patient's need for assistive devices and provide as appropriate  - Encourage maximum independence but intervene and supervise when necessary  - Involve family in performance of ADLs  - Assess for home care needs following discharge   - Consider OT consult to assist with ADL evaluation and planning for discharge  - Provide patient education as appropriate  Outcome: Progressing  Goal: Maintains/Returns to pre admission functional level  Description: INTERVENTIONS:  - Perform AM-PAC 6 Click Basic Mobility/ Daily Activity assessment daily.  - Set and communicate daily mobility goal to care team and patient/family/caregiver.   - Collaborate with rehabilitation services on mobility goals if consulted  - Perform Range of Motion 3 times a day.  - Reposition patient every 2 hours.  - Dangle patient 2 times a day  - Stand patient 3 times a day  - Ambulate patient 3 times a day  - Out of bed to chair 3 times a day   - Out of bed for meals 3 times a day  - Out of bed for toileting  - Record patient progress and toleration of activity level   Outcome: Progressing  Goal: Patient will remain free of falls  Description: INTERVENTIONS:  - Educate patient/family on patient safety including physical limitations  - Instruct patient to call for assistance with activity   - Consult OT/PT to assist with strengthening/mobility   - Keep Call bell within reach  - Keep bed low and locked with side rails adjusted as appropriate  - Keep care items and personal belongings within reach  - Initiate and maintain comfort rounds  - Apply yellow socks and bracelet for high fall risk patients  - Consider moving patient to room near nurses station  Outcome: Progressing     Problem: DISCHARGE PLANNING  Goal: Discharge to home or other facility with appropriate resources  Description: INTERVENTIONS:  - Identify barriers to discharge w/patient and  caregiver  - Arrange for needed discharge resources and transportation as appropriate  - Identify discharge learning needs (meds, wound care, etc.)  - Arrange for interpretive services to assist at discharge as needed  - Refer to Case Management Department for coordinating discharge planning if the patient needs post-hospital services based on physician/advanced practitioner order or complex needs related to functional status, cognitive ability, or social support system  Outcome: Progressing     Problem: Knowledge Deficit  Goal: Patient/family/caregiver demonstrates understanding of disease process, treatment plan, medications, and discharge instructions  Description: Complete learning assessment and assess knowledge base.  Interventions:  - Provide teaching at level of understanding  - Provide teaching via preferred learning methods  Outcome: Progressing     Problem: Nutrition/Hydration-ADULT  Goal: Nutrient/Hydration intake appropriate for improving, restoring or maintaining nutritional needs  Description: Monitor and assess patient's nutrition/hydration status for malnutrition. Collaborate with interdisciplinary team and initiate plan and interventions as ordered.  Monitor patient's weight and dietary intake as ordered or per policy. Utilize nutrition screening tool and intervene as necessary. Determine patient's food preferences and provide high-protein, high-caloric foods as appropriate.     INTERVENTIONS:  - Monitor oral intake, urinary output, labs, and treatment plans  - Assess nutrition and hydration status and recommend course of action  - Evaluate amount of meals eaten  - Assist patient with eating if necessary   - Allow adequate time for meals  - Recommend/ encourage appropriate diets, oral nutritional supplements, and vitamin/mineral supplements  - Order, calculate, and assess calorie counts as needed  - Recommend, monitor, and adjust tube feedings and TPN/PPN based on assessed needs  - Assess need for  intravenous fluids  - Provide specific nutrition/hydration education as appropriate  - Include patient/family/caregiver in decisions related to nutrition  Outcome: Progressing     Problem: DECISION MAKING  Goal: Pt/Family able to effectively weigh alternatives and participate in decision making related to treatment and care  Description: INTERVENTIONS:  - Identify decision maker  - Determine when there are differences among patient's view, family's view, and healthcare provider's view of patient condition and care goals  - Facilitate patient/family articulation of goals for care  - Help patient/family identify pros/cons of alternative solutions  - Provide information as requested by patient/family  - Respect patient/family rights related to privacy and sharing information   - Serve as a liaison between patient, family and health care team  - Initiate consults as appropriate (Ethics Team, Palliative Care, Family Care Conference, etc.)  Outcome: Progressing     Problem: CONFUSION/THOUGHT DISTURBANCE  Goal: Thought disturbances (confusion, delirium, depression, dementia or psychosis) are managed to maintain or return to baseline mental status and functional level  Description: INTERVENTIONS:  - Assess for possible contributors to  thought disturbance, including but not limited to medications, infection, impaired vision or hearing, underlying metabolic abnormalities, dehydration, respiratory compromise,  psychiatric diagnoses and notify attending PHYSICAN/AP  - Monitor and intervene to maintain adequate nutrition, hydration, elimination, sleep and activity  - Decrease environmental stimuli, including noise as appropriate.  - Provide frequent contacts to provide refocusing, direction and reassurance as needed. Approach patient calmly with eye contact and at their level.  - [place] high risk fall precautions, aspiration precautions and other safety measures, as indicated  - If delirium suspected, notify physician/AP of  change in condition and request immediate in-person evaluation  - Pursue consults as appropriate including Geriatric (campus dependent), OT for cognitive evaluation/activity planning, psychiatric, pastoral care, etc.  Outcome: Progressing

## 2024-07-09 NOTE — ASSESSMENT & PLAN NOTE
59 year old female presented with fever, rigidity and altered mental status.  LP perfomed: ME negative. Pending paraneoplastic panel, Autoimmune encephalopathy, and Lyme  MRI of the brain pending. Awaiting final read. Neurology evaluation pending MRI read.

## 2024-07-09 NOTE — CASE MANAGEMENT
Case Management Assessment & Discharge Planning Note    Patient name Meli Nowak  Location [facility] M* MRN [id number]  : 1965 Date 2024       Current Admission Date: 2024  Current Admission Diagnosis:Altered mental status   Patient Active Problem List    Diagnosis Date Noted Date Diagnosed    Altered mental status 2024     Acute hypernatremia 2024     Schizoaffective disorder, bipolar type (HCC) 2024     Medical clearance for psychiatric admission 2024     Type 2 diabetes mellitus (HCC) 2024     Obesity 2024     Psychosis (HCC) 2024     Tobacco abuse 2024     Hyperlipidemia 2024     Reactive airway disease 2024     NICHOLAS (acute kidney injury) (HCC) 2024     SIRS (systemic inflammatory response syndrome) ([facility]) 2024     Abnormal urinalysis 2024     Rhabdomyolysis 2024     Abdominal distension 2024       LOS (days): 4  Geometric Mean LOS (GMLOS) (days): 3.1  Days to GMLOS:-0.8     OBJECTIVE:    Risk of Unplanned Readmission Score: 18.2         Current admission status: Inpatient       Preferred Pharmacy:   UNKNOWN - FOLLOW UP PRIOR TO DISCHARGE TO E-PRESCRIBE  No address on file      Primary Care Provider: Adriana Bradford MD    Primary Insurance: MEDICARE  Secondary Insurance:     ASSESSMENT:  Active Health Care Proxies    There are no active Health Care Proxies on file.       Advance Directives  Does patient have a Health Care POA?: No  Does patient have Advance Directives?: No  Primary Contact: Chari Nowak (Daughter)  [phone] (Mobile)         Readmission Root Cause  30 Day Readmission: No    Patient Information  Admitted from:: Home  Mental Status: Confused  During Assessment patient was accompanied by: Other-Comment (Intensive , Conchita Hall)  Assessment information provided by:: Other - please comment (ICM- Conchita Hall)  Support Systems: /, Daughter  (ICM : Conchita Hall)  County of Residence: [place]  What city do you live in?: [place]  Home entry access options. Select all that apply.: Elevator  Type of Current Residence: Apartment  Floor Level: 8  Living Arrangements: Lives Alone  Is patient a ?: No    Activities of Daily Living Prior to Admission  Functional Status: Independent  Completes ADLs independently?: Yes  Ambulates independently?: Yes  Does patient currently own DME?: No  Does patient have a history of Outpatient Therapy (PT/OT)?: No  Does the patient have a history of Short-Term Rehab?: No  Does patient have a history of HHC?: No  Does patient currently have HHC?: No    Patient Information Continued  Income Source: SSI/SSD  Does patient receive dialysis treatments?: No  Current Status:: 303  Does patient have a history of Mental Health Diagnosis?: Yes (Schizoaffective disorder/ Schizophrenia)  Is patient receiving treatment for mental health?: Yes  Has patient received inpatient treatment related to mental health in the last 2 years?: Yes (IP -- [facility])    Means of Transportation  Means of Transport to App:: Friends      Social Determinants of Health (SDOH)      Flowsheet Row Most Recent Value   Housing Stability    In the last 12 months, was there a time when you were not able to pay the mortgage or rent on time? N   In the past 12 months, how many times have you moved where you were living? 0   At any time in the past 12 months, were you homeless or living in a shelter (including now)? N   Transportation Needs    In the past 12 months, has lack of transportation kept you from medical appointments or from getting medications? no   In the past 12 months, has lack of transportation kept you from meetings, work, or from getting things needed for daily living? No   Food Insecurity    Within the past 12 months, you worried that your food would run out before you got the money to buy more. Never true   Within the past 12 months, the  food you bought just didn't last and you didn't have money to get more. Never true   Utilities    In the past 12 months has the electric, gas, oil, or water company threatened to shut off services in your home? No            DISCHARGE DETAILS:    Discharge planning discussed with:: Intensive - Conchita Hall        CM contacted family/caregiver?: Yes (ICM: Darling Hall)  Were Treatment Team discharge recommendations reviewed with patient/caregiver?: Yes  Did patient/caregiver verbalize understanding of patient care needs?: Yes  Were patient/caregiver advised of the risks associated with not following Treatment Team discharge recommendations?: Yes    Contacts  Patient Contacts: RafaelConchita (Other)  [phone] (Home Phone)  Relationship to Patient:: Treatment Provider (Intensive )  Contact Method: In Person  Phone Number: Conchita Titus (Other)  [phone] (Home Phone)  Reason/Outcome: Emergency Contact    Requested Home Health Care         Is the patient interested in HHC at discharge?: No    Other Referral/Resources/Interventions Provided:  Interventions: Inpatient Behavioral Health  Referral Comments: INPT BH: 303 return to [facility]    Would you like to participate in our [facility] service program?  : No - Declined    Treatment Team Recommendation: Inpatient Behavioral Health  Discharge Destination Plan:: Inpatient Behavioral Health        Additional Comments: CM met with patient's Intensive  (ICM), Conchita Hall to complete assessment. Conchita has worked with [employer] as an ICM for prior 12 years. ICM indiciated it would not be productive for patient's other daughterVerónica to not be contacted or provided information. CM made note in file. Patient's daughter- Chari provided information. Patient reportedly does not have POA or LW. Pateint lives in an apartment alone and able to obtain medications. Patient has a Repayee: [facility]. Patient also recieves meals on  wheels. Patient on Social Security benefits. Patient's ICM, Conchita denies difficulties with SDOH. Patient completes ADLs independently. Patient has no history with HHC or STR. CM reviewed patient on 303 [facility]. CM to follow for further discharge planning.    Una Weems,

## 2024-07-09 NOTE — ASSESSMENT & PLAN NOTE
Lab Results   Component Value Date    HGBA1C 6.4 (H) 05/03/2024     Recent Labs     07/08/24  0043 07/08/24  0548 07/09/24  0533 07/09/24  1246   POCGLU 93 84 85 85       Blood Sugar Average: Last 72 hrs:  (P) 97.2    Hold Metformin  Sliding scale

## 2024-07-09 NOTE — PLAN OF CARE
Problem: Prexisting or High Potential for Compromised Skin Integrity  Goal: Skin integrity is maintained or improved  Description: INTERVENTIONS:  - Identify patients at risk for skin breakdown  - Assess and monitor skin integrity  - Assess and monitor nutrition and hydration status  - Monitor labs   - Assess for incontinence   - Turn and reposition patient  - Assist with mobility/ambulation  - Relieve pressure over bony prominences  - Avoid friction and shearing  - Provide appropriate hygiene as needed including keeping skin clean and dry  - Evaluate need for skin moisturizer/barrier cream  - Collaborate with interdisciplinary team   - Patient/family teaching  - Consider wound care consult   Outcome: Progressing     Problem: MOBILITY - ADULT  Goal: Maintain or return to baseline ADL function  Description: INTERVENTIONS:  -  Assess patient's ability to carry out ADLs; assess patient's baseline for ADL function and identify physical deficits which impact ability to perform ADLs (bathing, care of mouth/teeth, toileting, grooming, dressing, etc.)  - Assess/evaluate cause of self-care deficits   - Assess range of motion  - Assess patient's mobility; develop plan if impaired  - Assess patient's need for assistive devices and provide as appropriate  - Encourage maximum independence but intervene and supervise when necessary  - Involve family in performance of ADLs  - Assess for home care needs following discharge   - Consider OT consult to assist with ADL evaluation and planning for discharge  - Provide patient education as appropriate  Outcome: Progressing  Goal: Maintains/Returns to pre admission functional level  Description: INTERVENTIONS:  - Perform AM-PAC 6 Click Basic Mobility/ Daily Activity assessment daily.  - Set and communicate daily mobility goal to care team and patient/family/caregiver.   - Collaborate with rehabilitation services on mobility goals if consulted  - Perform Range of Motion 3 times a day.  -  Reposition patient every 2 hours.  - Dangle patient 2 times a day  - Stand patient 3 times a day  - Ambulate patient 3 times a day  - Out of bed to chair 3 times a day   - Out of bed for meals 3 times a day  - Out of bed for toileting  - Record patient progress and toleration of activity level   Outcome: Progressing     Problem: PAIN - ADULT  Goal: Verbalizes/displays adequate comfort level or baseline comfort level  Description: Interventions:  - Encourage patient to monitor pain and request assistance  - Assess pain using appropriate pain scale  - Administer analgesics based on type and severity of pain and evaluate response  - Implement non-pharmacological measures as appropriate and evaluate response  - Consider cultural and social influences on pain and pain management  - Notify physician/advanced practitioner if interventions unsuccessful or patient reports new pain  Outcome: Progressing     Problem: INFECTION - ADULT  Goal: Absence or prevention of progression during hospitalization  Description: INTERVENTIONS:  - Assess and monitor for signs and symptoms of infection  - Monitor lab/diagnostic results  - Monitor all insertion sites, i.e. indwelling lines, tubes, and drains  - Monitor endotracheal if appropriate and nasal secretions for changes in amount and color  - [place] appropriate cooling/warming therapies per order  - Administer medications as ordered  - Instruct and encourage patient and family to use good hand hygiene technique  - Identify and instruct in appropriate isolation precautions for identified infection/condition  Outcome: Progressing     Problem: SAFETY ADULT  Goal: Maintain or return to baseline ADL function  Description: INTERVENTIONS:  -  Assess patient's ability to carry out ADLs; assess patient's baseline for ADL function and identify physical deficits which impact ability to perform ADLs (bathing, care of mouth/teeth, toileting, grooming, dressing, etc.)  - Assess/evaluate cause of  self-care deficits   - Assess range of motion  - Assess patient's mobility; develop plan if impaired  - Assess patient's need for assistive devices and provide as appropriate  - Encourage maximum independence but intervene and supervise when necessary  - Involve family in performance of ADLs  - Assess for home care needs following discharge   - Consider OT consult to assist with ADL evaluation and planning for discharge  - Provide patient education as appropriate  Outcome: Progressing  Goal: Maintains/Returns to pre admission functional level  Description: INTERVENTIONS:  - Perform AM-PAC 6 Click Basic Mobility/ Daily Activity assessment daily.  - Set and communicate daily mobility goal to care team and patient/family/caregiver.   - Collaborate with rehabilitation services on mobility goals if consulted  - Perform Range of Motion 3 times a day.  - Reposition patient every 2 hours.  - Dangle patient 2 times a day  - Stand patient 3 times a day  - Ambulate patient 3 times a day  - Out of bed to chair 3 times a day   - Out of bed for meals 3 times a day  - Out of bed for toileting  - Record patient progress and toleration of activity level   Outcome: Progressing  Goal: Patient will remain free of falls  Description: INTERVENTIONS:  - Educate patient/family on patient safety including physical limitations  - Instruct patient to call for assistance with activity   - Consult OT/PT to assist with strengthening/mobility   - Keep Call bell within reach  - Keep bed low and locked with side rails adjusted as appropriate  - Keep care items and personal belongings within reach  - Initiate and maintain comfort rounds  - Make Fall Risk Sign visible to staff  - Offer Toileting every 2 Hours, in advance of need  - Initiate/Maintain bed alarm  - Obtain necessary fall risk management equipment: yellow socks  - Apply yellow socks and bracelet for high fall risk patients  - Consider moving patient to room near nurses station  Outcome:  Progressing     Problem: DISCHARGE PLANNING  Goal: Discharge to home or other facility with appropriate resources  Description: INTERVENTIONS:  - Identify barriers to discharge w/patient and caregiver  - Arrange for needed discharge resources and transportation as appropriate  - Identify discharge learning needs (meds, wound care, etc.)  - Arrange for interpretive services to assist at discharge as needed  - Refer to Case Management Department for coordinating discharge planning if the patient needs post-hospital services based on physician/advanced practitioner order or complex needs related to functional status, cognitive ability, or social support system  Outcome: Progressing     Problem: Knowledge Deficit  Goal: Patient/family/caregiver demonstrates understanding of disease process, treatment plan, medications, and discharge instructions  Description: Complete learning assessment and assess knowledge base.  Interventions:  - Provide teaching at level of understanding  - Provide teaching via preferred learning methods  Outcome: Progressing     Problem: Nutrition/Hydration-ADULT  Goal: Nutrient/Hydration intake appropriate for improving, restoring or maintaining nutritional needs  Description: Monitor and assess patient's nutrition/hydration status for malnutrition. Collaborate with interdisciplinary team and initiate plan and interventions as ordered.  Monitor patient's weight and dietary intake as ordered or per policy. Utilize nutrition screening tool and intervene as necessary. Determine patient's food preferences and provide high-protein, high-caloric foods as appropriate.     INTERVENTIONS:  - Monitor oral intake, urinary output, labs, and treatment plans  - Assess nutrition and hydration status and recommend course of action  - Evaluate amount of meals eaten  - Assist patient with eating if necessary   - Allow adequate time for meals  - Recommend/ encourage appropriate diets, oral nutritional supplements,  and vitamin/mineral supplements  - Order, calculate, and assess calorie counts as needed  - Recommend, monitor, and adjust tube feedings and TPN/PPN based on assessed needs  - Assess need for intravenous fluids  - Provide specific nutrition/hydration education as appropriate  - Include patient/family/caregiver in decisions related to nutrition  Outcome: Progressing     Problem: DECISION MAKING  Goal: Pt/Family able to effectively weigh alternatives and participate in decision making related to treatment and care  Description: INTERVENTIONS:  - Identify decision maker  - Determine when there are differences among patient's view, family's view, and healthcare provider's view of patient condition and care goals  - Facilitate patient/family articulation of goals for care  - Help patient/family identify pros/cons of alternative solutions  - Provide information as requested by patient/family  - Respect patient/family rights related to privacy and sharing information   - Serve as a liaison between patient, family and health care team  - Initiate consults as appropriate (Ethics Team, Palliative Care, Family Care Conference, etc.)  Outcome: Progressing     Problem: CONFUSION/THOUGHT DISTURBANCE  Goal: Thought disturbances (confusion, delirium, depression, dementia or psychosis) are managed to maintain or return to baseline mental status and functional level  Description: INTERVENTIONS:  - Assess for possible contributors to  thought disturbance, including but not limited to medications, infection, impaired vision or hearing, underlying metabolic abnormalities, dehydration, respiratory compromise,  psychiatric diagnoses and notify attending PHYSICAN/AP  - Monitor and intervene to maintain adequate nutrition, hydration, elimination, sleep and activity  - Decrease environmental stimuli, including noise as appropriate.  - Provide frequent contacts to provide refocusing, direction and reassurance as needed. Approach patient  calmly with eye contact and at their level.  - [place] high risk fall precautions, aspiration precautions and other safety measures, as indicated  - If delirium suspected, notify physician/AP of change in condition and request immediate in-person evaluation  - Pursue consults as appropriate including Geriatric (campus dependent), OT for cognitive evaluation/activity planning, psychiatric, pastoral care, etc.  Outcome: Progressing

## 2024-07-09 NOTE — ASSESSMENT & PLAN NOTE
Hypernatremic during her admission at [place].  Resolved with dextrose infusion. Continue free water intake    Recent Labs     07/07/24  0517 07/08/24  0546 07/09/24  0807   SODIUM 143 143 143

## 2024-07-10 LAB
AMPAR1 IGG CSF QL IF: NEGATIVE
AMPAR2 IGG CSF QL IF: NEGATIVE
AMPHIPHYSIN AB CSF QL IF: NEGATIVE
ANION GAP SERPL CALCULATED.3IONS-SCNC: 8 MMOL/L (ref 4–13)
AQP4 H2O CHANNEL AB CSF QL IF: NEGATIVE
BUN SERPL-MCNC: 24 MG/DL (ref 5–25)
CALCIUM SERPL-MCNC: 8.8 MG/DL (ref 8.4–10.2)
CASPR2 AB CSF QL CBA IFA: NEGATIVE
CHLORIDE SERPL-SCNC: 108 MMOL/L (ref 96–108)
CO2 SERPL-SCNC: 30 MMOL/L (ref 21–32)
CREAT SERPL-MCNC: 0.73 MG/DL (ref 0.6–1.3)
CV2 AB CSF QL IF: NEGATIVE
DPPX IGG CSF QL IF: NEGATIVE
ERYTHROCYTE [DISTWIDTH] IN BLOOD BY AUTOMATED COUNT: 15.9 % (ref 11.6–15.1)
GABABR AB CSF QL CBA IFA: NEGATIVE
GAD65 AB CSF IA-SCNC: NEGATIVE NMOL/L
GFR SERPL CREATININE-BSD FRML MDRD: 91 ML/MIN/1.73SQ M
GLIAL NUC TYPE 1 AB CSF QL IF: NEGATIVE
GLUCOSE SERPL-MCNC: 73 MG/DL (ref 65–140)
GLUCOSE SERPL-MCNC: 76 MG/DL (ref 65–140)
GLUCOSE SERPL-MCNC: 87 MG/DL (ref 65–140)
GLUCOSE SERPL-MCNC: 88 MG/DL (ref 65–140)
HCT VFR BLD AUTO: 40.7 % (ref 34.8–46.1)
HGB BLD-MCNC: 12.4 G/DL (ref 11.5–15.4)
HU1 AB CSF QL IF: NEGATIVE
HU2 AB CSF QL IF: NEGATIVE
HU3 AB CSF QL IF: NEGATIVE
IGLON5 IGG CSF QL CBA IFA: NEGATIVE
IMP & REVIEW OF LAB RESULTS: NEGATIVE
IP3R 1 IGG CSF QL IF: NEGATIVE
LGI1 AB CSF QL CBA IFA: NEGATIVE
MA+TA AB CSF QL IF: NEGATIVE
MCH RBC QN AUTO: 28.2 PG (ref 26.8–34.3)
MCHC RBC AUTO-ENTMCNC: 30.5 G/DL (ref 31.4–37.4)
MCV RBC AUTO: 93 FL (ref 82–98)
MGLUR1 IGG CSF QL CBA IFA: NEGATIVE
NMDAR IGG CSF QL IF: NEGATIVE
PCA-2 AB CSF QL IF: NEGATIVE
PCA-TR AB CSF QL CBA IFA: NEGATIVE
PCA-TR AB CSF QL IF: NEGATIVE
PLATELET # BLD AUTO: 231 THOUSANDS/UL (ref 149–390)
PMV BLD AUTO: 10.3 FL (ref 8.9–12.7)
POTASSIUM SERPL-SCNC: 3.2 MMOL/L (ref 3.5–5.3)
PURKINJE CELLS AB CSF QL IF: NEGATIVE
RBC # BLD AUTO: 4.4 MILLION/UL (ref 3.81–5.12)
SODIUM SERPL-SCNC: 146 MMOL/L (ref 135–147)
WBC # BLD AUTO: 8.86 THOUSAND/UL (ref 4.31–10.16)
ZIC4 ANTIBODY, CSF: NEGATIVE

## 2024-07-10 PROCEDURE — 85027 COMPLETE CBC AUTOMATED: CPT | Performed by: STUDENT IN AN ORGANIZED HEALTH CARE EDUCATION/TRAINING PROGRAM

## 2024-07-10 PROCEDURE — 99232 SBSQ HOSP IP/OBS MODERATE 35: CPT | Performed by: STUDENT IN AN ORGANIZED HEALTH CARE EDUCATION/TRAINING PROGRAM

## 2024-07-10 PROCEDURE — 80048 BASIC METABOLIC PNL TOTAL CA: CPT | Performed by: STUDENT IN AN ORGANIZED HEALTH CARE EDUCATION/TRAINING PROGRAM

## 2024-07-10 PROCEDURE — 82948 REAGENT STRIP/BLOOD GLUCOSE: CPT

## 2024-07-10 RX ORDER — LORAZEPAM 2 MG/ML
1 INJECTION INTRAMUSCULAR ONCE
Status: COMPLETED | OUTPATIENT
Start: 2024-07-10 | End: 2024-07-10

## 2024-07-10 RX ORDER — POTASSIUM CHLORIDE 20 MEQ/1
40 TABLET, EXTENDED RELEASE ORAL ONCE
Status: DISCONTINUED | OUTPATIENT
Start: 2024-07-10 | End: 2024-07-11 | Stop reason: HOSPADM

## 2024-07-10 RX ADMIN — POLYETHYLENE GLYCOL 3350 17 G: 17 POWDER, FOR SOLUTION ORAL at 08:22

## 2024-07-10 RX ADMIN — LORAZEPAM 1 MG: 2 INJECTION INTRAMUSCULAR; INTRAVENOUS at 20:25

## 2024-07-10 RX ADMIN — LORAZEPAM 1 MG: 2 INJECTION INTRAMUSCULAR; INTRAVENOUS at 11:40

## 2024-07-10 RX ADMIN — LORAZEPAM 1 MG: 2 INJECTION INTRAMUSCULAR; INTRAVENOUS at 16:28

## 2024-07-10 RX ADMIN — THIAMINE HYDROCHLORIDE 500 MG: 100 INJECTION, SOLUTION INTRAMUSCULAR; INTRAVENOUS at 08:22

## 2024-07-10 RX ADMIN — LORAZEPAM 1 MG: 2 INJECTION INTRAMUSCULAR; INTRAVENOUS at 00:28

## 2024-07-10 RX ADMIN — CYANOCOBALAMIN TAB 500 MCG 1000 MCG: 500 TAB at 08:22

## 2024-07-10 RX ADMIN — LORAZEPAM 1 MG: 2 INJECTION INTRAMUSCULAR; INTRAVENOUS at 14:29

## 2024-07-10 RX ADMIN — LORAZEPAM 1 MG: 2 INJECTION INTRAMUSCULAR; INTRAVENOUS at 08:22

## 2024-07-10 RX ADMIN — THIAMINE HYDROCHLORIDE 500 MG: 100 INJECTION, SOLUTION INTRAMUSCULAR; INTRAVENOUS at 16:28

## 2024-07-10 RX ADMIN — LORAZEPAM 1 MG: 2 INJECTION INTRAMUSCULAR; INTRAVENOUS at 04:16

## 2024-07-10 RX ADMIN — ENOXAPARIN SODIUM 40 MG: 40 INJECTION SUBCUTANEOUS at 08:22

## 2024-07-10 NOTE — ASSESSMENT & PLAN NOTE
Background:  Patient is a 302 at [place] for acute psychotic episode.  She was originally brought to [facility] after [employer] found her confused on a park bench.  She met with crisis and lacked capacity to sign a 201. A 302 was upheld and [facility].  During admission, she was noted semicatatonic and somewhat rigid concerning for NMS versus catatonia versus cholinergic rebound syndrome.  She met SIRS criteria with tachycardia and leukocytosis.  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

## 2024-07-10 NOTE — ED NOTES
Call placed to [facility] ([phone])- Spoke to Anmol Grahma will be emailing [phone] Commitment to this CIS who will send it to the  Intake Department.    Wilner Wang  Crisis Intervention Specialist II  07/10/24

## 2024-07-10 NOTE — ED NOTES
303 received from Gladys lema [facility]- Forwarded to  Intake (Perri Ward) and Pam Wang  Crisis Intervention Specialist II  07/10/24

## 2024-07-10 NOTE — QUICK NOTE
Reviewed prior neurology notes from admission and MRI brain obtained yesterday:  -No acute intracranial pathology nor post contrast enhancement.   -Paraneoplastic comprehensive CSF panel returned negative.    -WIll await pending CSF studies (Lyme, Autoimmune panel), however given clinical improvement over course of past week, no empiric treatment nor additional neurodiagnostics necessary at this juncture. Should any of the pending CSF results return positive, can then re-address need for treatment.     Discussed with attending neurologist and primary team: ok from neurology standpoint for discharge back to  for ongoing psychiatric care/management. Will remains available for questions/concerns.

## 2024-07-10 NOTE — ED NOTES
Per [facility] Intake- Patient to be reviewed by treatment team on 7/11/24.    Wilner Wang  Crisis Intervention Specialist II  07/10/24

## 2024-07-10 NOTE — ASSESSMENT & PLAN NOTE
59 year old female presented with fever, rigidity and altered mental status.  LP perfomed: ME negative. Pending paraneoplastic panel, Autoimmune encephalopathy, and Lyme  MRI brain showed: No acute intracranial pathology. Mild chronic microangiopathy.   Awaiting next step in management from neurology to determine disposition

## 2024-07-10 NOTE — ASSESSMENT & PLAN NOTE
Lab Results   Component Value Date    HGBA1C 6.4 (H) 05/03/2024     Recent Labs     07/09/24  0533 07/09/24  1246 07/09/24  2326 07/10/24  0514   POCGLU 85 85 77 76       Blood Sugar Average: Last 72 hrs:  (P) [id number]    Hold Metformin  Sliding scale

## 2024-07-10 NOTE — ASSESSMENT & PLAN NOTE
Resolved, back to baseline    Recent Labs     07/08/24  0546 07/09/24  0807 07/10/24  0539   BUN 21 24 24   CREATININE 0.67 0.75 0.73   EGFR 97 88 91

## 2024-07-10 NOTE — PLAN OF CARE
Problem: Prexisting or High Potential for Compromised Skin Integrity  Goal: Skin integrity is maintained or improved  Description: INTERVENTIONS:  - Identify patients at risk for skin breakdown  - Assess and monitor skin integrity  - Assess and monitor nutrition and hydration status  - Monitor labs   - Assess for incontinence   - Turn and reposition patient  - Assist with mobility/ambulation  - Relieve pressure over bony prominences  - Avoid friction and shearing  - Provide appropriate hygiene as needed including keeping skin clean and dry  - Evaluate need for skin moisturizer/barrier cream  - Collaborate with interdisciplinary team   - Patient/family teaching  - Consider wound care consult   Outcome: Progressing     Problem: MOBILITY - ADULT  Goal: Maintain or return to baseline ADL function  Description: INTERVENTIONS:  -  Assess patient's ability to carry out ADLs; assess patient's baseline for ADL function and identify physical deficits which impact ability to perform ADLs (bathing, care of mouth/teeth, toileting, grooming, dressing, etc.)  - Assess/evaluate cause of self-care deficits   - Assess range of motion  - Assess patient's mobility; develop plan if impaired  - Assess patient's need for assistive devices and provide as appropriate  - Encourage maximum independence but intervene and supervise when necessary  - Involve family in performance of ADLs  - Assess for home care needs following discharge   - Consider OT consult to assist with ADL evaluation and planning for discharge  - Provide patient education as appropriate  Outcome: Progressing  Goal: Maintains/Returns to pre admission functional level  Description: INTERVENTIONS:  - Perform AM-PAC 6 Click Basic Mobility/ Daily Activity assessment daily.  - Set and communicate daily mobility goal to care team and patient/family/caregiver.   - Collaborate with rehabilitation services on mobility goals if consulted  - Perform Range of Motion 3 times a day.  -  Reposition patient every 2 hours.  - Dangle patient 2 times a day  - Stand patient 3 times a day  - Ambulate patient 3 times a day  - Out of bed to chair 3 times a day   - Out of bed for meals 3 times a day  - Out of bed for toileting  - Record patient progress and toleration of activity level   Outcome: Progressing     Problem: PAIN - ADULT  Goal: Verbalizes/displays adequate comfort level or baseline comfort level  Description: Interventions:  - Encourage patient to monitor pain and request assistance  - Assess pain using appropriate pain scale  - Administer analgesics based on type and severity of pain and evaluate response  - Implement non-pharmacological measures as appropriate and evaluate response  - Consider cultural and social influences on pain and pain management  - Notify physician/advanced practitioner if interventions unsuccessful or patient reports new pain  Outcome: Progressing     Problem: INFECTION - ADULT  Goal: Absence or prevention of progression during hospitalization  Description: INTERVENTIONS:  - Assess and monitor for signs and symptoms of infection  - Monitor lab/diagnostic results  - Monitor all insertion sites, i.e. indwelling lines, tubes, and drains  - Monitor endotracheal if appropriate and nasal secretions for changes in amount and color  - [place] appropriate cooling/warming therapies per order  - Administer medications as ordered  - Instruct and encourage patient and family to use good hand hygiene technique  - Identify and instruct in appropriate isolation precautions for identified infection/condition  Outcome: Progressing     Problem: SAFETY ADULT  Goal: Maintain or return to baseline ADL function  Description: INTERVENTIONS:  -  Assess patient's ability to carry out ADLs; assess patient's baseline for ADL function and identify physical deficits which impact ability to perform ADLs (bathing, care of mouth/teeth, toileting, grooming, dressing, etc.)  - Assess/evaluate cause of  self-care deficits   - Assess range of motion  - Assess patient's mobility; develop plan if impaired  - Assess patient's need for assistive devices and provide as appropriate  - Encourage maximum independence but intervene and supervise when necessary  - Involve family in performance of ADLs  - Assess for home care needs following discharge   - Consider OT consult to assist with ADL evaluation and planning for discharge  - Provide patient education as appropriate  Outcome: Progressing  Goal: Maintains/Returns to pre admission functional level  Description: INTERVENTIONS:  - Perform AM-PAC 6 Click Basic Mobility/ Daily Activity assessment daily.  - Set and communicate daily mobility goal to care team and patient/family/caregiver.   - Collaborate with rehabilitation services on mobility goals if consulted  - Perform Range of Motion 3 times a day.  - Reposition patient every 2 hours.  - Dangle patient 2 times a day  - Stand patient 3 times a day  - Ambulate patient 3 times a day  - Out of bed to chair 3 times a day   - Out of bed for meals 3 times a day  - Out of bed for toileting  - Record patient progress and toleration of activity level   Outcome: Progressing  Goal: Patient will remain free of falls  Description: INTERVENTIONS:  - Educate patient/family on patient safety including physical limitations  - Instruct patient to call for assistance with activity   - Consult OT/PT to assist with strengthening/mobility   - Keep Call bell within reach  - Keep bed low and locked with side rails adjusted as appropriate  - Keep care items and personal belongings within reach  - Initiate and maintain comfort rounds  - Make Fall Risk Sign visible to staff  - Offer Toileting every 2 Hours, in advance of need  - Apply yellow socks and bracelet for high fall risk patients  - Consider moving patient to room near nurses station  Outcome: Progressing     Problem: DISCHARGE PLANNING  Goal: Discharge to home or other facility with  appropriate resources  Description: INTERVENTIONS:  - Identify barriers to discharge w/patient and caregiver  - Arrange for needed discharge resources and transportation as appropriate  - Identify discharge learning needs (meds, wound care, etc.)  - Arrange for interpretive services to assist at discharge as needed  - Refer to Case Management Department for coordinating discharge planning if the patient needs post-hospital services based on physician/advanced practitioner order or complex needs related to functional status, cognitive ability, or social support system  Outcome: Progressing     Problem: Knowledge Deficit  Goal: Patient/family/caregiver demonstrates understanding of disease process, treatment plan, medications, and discharge instructions  Description: Complete learning assessment and assess knowledge base.  Interventions:  - Provide teaching at level of understanding  - Provide teaching via preferred learning methods  Outcome: Progressing     Problem: Nutrition/Hydration-ADULT  Goal: Nutrient/Hydration intake appropriate for improving, restoring or maintaining nutritional needs  Description: Monitor and assess patient's nutrition/hydration status for malnutrition. Collaborate with interdisciplinary team and initiate plan and interventions as ordered.  Monitor patient's weight and dietary intake as ordered or per policy. Utilize nutrition screening tool and intervene as necessary. Determine patient's food preferences and provide high-protein, high-caloric foods as appropriate.     INTERVENTIONS:  - Monitor oral intake, urinary output, labs, and treatment plans  - Assess nutrition and hydration status and recommend course of action  - Evaluate amount of meals eaten  - Assist patient with eating if necessary   - Allow adequate time for meals  - Recommend/ encourage appropriate diets, oral nutritional supplements, and vitamin/mineral supplements  - Order, calculate, and assess calorie counts as needed  -  Recommend, monitor, and adjust tube feedings and TPN/PPN based on assessed needs  - Assess need for intravenous fluids  - Provide specific nutrition/hydration education as appropriate  - Include patient/family/caregiver in decisions related to nutrition  Outcome: Progressing     Problem: DECISION MAKING  Goal: Pt/Family able to effectively weigh alternatives and participate in decision making related to treatment and care  Description: INTERVENTIONS:  - Identify decision maker  - Determine when there are differences among patient's view, family's view, and healthcare provider's view of patient condition and care goals  - Facilitate patient/family articulation of goals for care  - Help patient/family identify pros/cons of alternative solutions  - Provide information as requested by patient/family  - Respect patient/family rights related to privacy and sharing information   - Serve as a liaison between patient, family and health care team  - Initiate consults as appropriate (Ethics Team, Palliative Care, Family Care Conference, etc.)  Outcome: Progressing     Problem: CONFUSION/THOUGHT DISTURBANCE  Goal: Thought disturbances (confusion, delirium, depression, dementia or psychosis) are managed to maintain or return to baseline mental status and functional level  Description: INTERVENTIONS:  - Assess for possible contributors to  thought disturbance, including but not limited to medications, infection, impaired vision or hearing, underlying metabolic abnormalities, dehydration, respiratory compromise,  psychiatric diagnoses and notify attending PHYSICAN/AP  - Monitor and intervene to maintain adequate nutrition, hydration, elimination, sleep and activity  - Decrease environmental stimuli, including noise as appropriate.  - Provide frequent contacts to provide refocusing, direction and reassurance as needed. Approach patient calmly with eye contact and at their level.  - [place] high risk fall precautions, aspiration  precautions and other safety measures, as indicated  - If delirium suspected, notify physician/AP of change in condition and request immediate in-person evaluation  - Pursue consults as appropriate including Geriatric (campus dependent), OT for cognitive evaluation/activity planning, psychiatric, pastoral care, etc.  Outcome: Progressing

## 2024-07-10 NOTE — CASE MANAGEMENT
Case Management Assessment    Patient name Meli Nowak  Location [facility] M* MRN [id number]  : 1965 Date 7/10/2024         OBJECTIVE:    Risk of Unplanned Readmission Score: 16.22         Current admission status: Inpatient         ASSESSMENT:  Active Health Care Proxies    There are no active Health Care Proxies on file.                                     Patient Information Continued  Current Status:: 303 (20 additional days of IP psych tx approved by [facility] on 24. 303 expires on 24, next hearing for the 304 if needed should be held on 24)                Social Determinants of Health (SDOH)      Flowsheet Row Most Recent Value   Housing Stability    In the last 12 months, was there a time when you were not able to pay the mortgage or rent on time? N   In the past 12 months, how many times have you moved where you were living? 0   At any time in the past 12 months, were you homeless or living in a shelter (including now)? N   Transportation Needs    In the past 12 months, has lack of transportation kept you from medical appointments or from getting medications? no   In the past 12 months, has lack of transportation kept you from meetings, work, or from getting things needed for daily living? No   Food Insecurity    Within the past 12 months, you worried that your food would run out before you got the money to buy more. Never true   Within the past 12 months, the food you bought just didn't last and you didn't have money to get more. Never true   Utilities    In the past 12 months has the electric, gas, oil, or water company threatened to shut off services in your home? No

## 2024-07-10 NOTE — CASE MANAGEMENT
Case Management Discharge Planning Note    Patient name Meli DORAN [facility]  Location [facility] 2 /South 2 M* MRN [id number]  : 1965 Date 7/10/2024       Current Admission Date: 2024  Current Admission Diagnosis:Altered mental status   Patient Active Problem List    Diagnosis Date Noted Date Diagnosed    Altered mental status 2024     Acute hypernatremia 2024     Schizoaffective disorder, bipolar type (HCC) 2024     Medical clearance for psychiatric admission 2024     Type 2 diabetes mellitus (HCC) 2024     Obesity 2024     Psychosis (HCC) 2024     Tobacco abuse 2024     Hyperlipidemia 2024     Reactive airway disease 2024     NICHOLAS (acute kidney injury) ([facility]) 2024     SIRS (systemic inflammatory response syndrome) ([facility]) 2024     Abnormal urinalysis 2024     Rhabdomyolysis 2024     Abdominal distension 2024       LOS (days): 5  Geometric Mean LOS (GMLOS) (days): 3.1  Days to GMLOS:-1.7     OBJECTIVE:  Risk of Unplanned Readmission Score: 16.22         Current admission status: Inpatient   Preferred Pharmacy:   UNKNOWN - FOLLOW UP PRIOR TO DISCHARGE TO E-PRESCRIBE  No address on file      Primary Care Provider: Adriana Bradford MD    Primary Insurance: MEDICARE  Secondary Insurance:     DISCHARGE DETAILS:        Additional Comments: CM confirmed with attending patient medically clear for discharge. CM sent message follow up message to [place] behavioral health crisis. Crisis requested fax of 303- CM sent via fax to crisis. ([phone]). CM to follow for bed availability.    Una Weems,

## 2024-07-10 NOTE — ASSESSMENT & PLAN NOTE
Hypernatremic during her admission at [facility].  Resolved with dextrose infusion. Continue free water intake    Recent Labs     07/08/24  0546 07/09/24  0807 07/10/24  0539   SODIUM 143 143 146

## 2024-07-10 NOTE — ED NOTES
Call placed to [facility]- Spoke to Abelardo- Per Abelardo it looks like paperwork was received, but is unsure if 303 hearing took place for patient. Abelardo recommended [facility] call [phone] to inquire.    Call placed to [phone]- Initial call was disconnected. Second call placed- Spoke to Gladys- Adolph blair 303 was completed on 7/5/24.    Wilner Wang  Crisis Intervention Specialist II  07/10/24

## 2024-07-10 NOTE — PROGRESS NOTES
[facility]  Progress Note  Name: Meli Nowak I  MRN: [id number]  Unit/Bed#: [place] [address] -01 I Date of Admission: 7/5/2024   Date of Service: 7/10/2024 I Hospital Day: 5    Assessment & Plan   * Altered mental status  Assessment & Plan  59 year old female presented with fever, rigidity and altered mental status.  LP perfomed: ME negative. Pending paraneoplastic panel, Autoimmune encephalopathy, and Lyme  MRI brain showed: No acute intracranial pathology. Mild chronic microangiopathy.   Awaiting next step in management from neurology to determine disposition    Psychosis (HCC)  Assessment & Plan  Background:  Patient is a 302 at [facility] for acute psychotic episode.  She was originally brought to [facility] after [employer] found her confused on a park bench.  She met with crisis and lacked capacity to sign a 201. A 302 was upheld and she was transferred to [facility].  During admission, she was noted semicatatonic and somewhat rigid concerning for NMS versus catatonia versus cholinergic rebound syndrome.  She met SIRS criteria with tachycardia and leukocytosis.  Transferred to [facility] for neurologic assessment, LP and MRI to rule encephalitis/meningitis    Continue treatment per psychiatry:  IV Ativan 2 mg q4h to address possible catatonia/neurolepetic malignant syndrome in lieu of Zyprexa 5 mg daily, seeing as antipsychotics can worsen aforementioned syndromes   Do not restart antipsychotic medications, avoid serotonergic or anticholinergic agents  One-to-one observation      Acute hypernatremia  Assessment & Plan  Hypernatremic during her admission at [facility].  Resolved with dextrose infusion. Continue free water intake    Recent Labs     07/08/24  0546 07/09/24  0807 07/10/24  0539   SODIUM 143 143 146         NICHOLAS (acute kidney injury) (HCC)  Assessment & Plan  Resolved, back to baseline    Recent Labs     07/08/24  0546 07/09/24  0807 07/10/24  0539   BUN 21 24 24   CREATININE 0.67 0.75  0.73   EGFR 97 88 91                   Reactive airway disease  Assessment & Plan  Noted at [place] with hypoxia 86-88%.  Improved with 2LNC  Continue current regimen    Type 2 diabetes mellitus (HCC)  Assessment & Plan  Lab Results   Component Value Date    HGBA1C 6.4 (H) 2024     Recent Labs     24  0533 24  1246 24  2326 07/10/24  0514   POCGLU 85 85 77 76       Blood Sugar Average: Last 72 hrs:  (P) [id number]    Hold Metformin  Sliding scale           VTE Pharmacologic Prophylaxis:   Pharmacologic: Enoxaparin (Lovenox)  Mechanical VTE Prophylaxis in Place: Yes    Discussions with Specialists or Other Care Team Provider: nursing    Education and Discussions with Family / Patient: patient    Current Length of Stay: 5 day(s)    Current Patient Status: Inpatient   Certification Statement: The patient will continue to require additional inpatient hospital stay due to awaiting neurology follow-up, dispo planning    Discharge Plan: 24 hours    Code Status: Level 1 - Full Code      Subjective:   Patient seen and examined at bedside. She appears comfortable. No new issues overnight.    Objective:     Vitals:   Temp (24hrs), Av °F (37.2 °C), Min:98.1 °F (36.7 °C), Max:100.2 °F (37.9 °C)    Temp:  [98.1 °F (36.7 °C)-100.2 °F (37.9 °C)] 98.3 °F (36.8 °C)  HR:  [69-92] 78  Resp:  [14-20] 17  BP: (156-173)/(73-88) 165/81  SpO2:  [88 %-94 %] 91 %  Body mass index is 37.1 kg/m².     Input and Output Summary (last 24 hours):       Intake/Output Summary (Last 24 hours) at 7/10/2024 0827  Last data filed at 7/10/2024 0600  Gross per 24 hour   Intake 390 ml   Output 1033 ml   Net -643 ml       Physical Exam:     Physical Exam  Vitals reviewed.   Constitutional:       General: She is not in acute distress.  HENT:      Head: Normocephalic.      Mouth/Throat:      Mouth: Mucous membranes are moist.   Eyes:      General: No scleral icterus.  Cardiovascular:      Rate and Rhythm: Normal rate.    Pulmonary:      Effort: Pulmonary effort is normal. No respiratory distress.   Abdominal:      Palpations: Abdomen is soft.      Tenderness: There is no abdominal tenderness.   Skin:     General: Skin is warm.   Neurological:      Mental Status: She is alert.          Additional Data:     Labs:    Results from last 7 days   Lab Units 07/10/24  0539 07/07/24  0517 07/06/24  0610   WBC Thousand/uL 8.86   < > 9.53   HEMOGLOBIN g/dL 12.4   < > 12.6   HEMATOCRIT % 40.7   < > 42.9   PLATELETS Thousands/uL 231   < > 266   SEGS PCT %  --   --  64   LYMPHO PCT %  --   --  22   MONO PCT %  --   --  12   EOS PCT %  --   --  0    < > = values in this interval not displayed.     Results from last 7 days   Lab Units 07/10/24  0539 07/07/24  0517 07/06/24  0610   SODIUM mmol/L 146   < > 147   POTASSIUM mmol/L 3.2*   < > 3.7   CHLORIDE mmol/L 108   < > 112*   CO2 mmol/L 30   < > 30   BUN mg/dL 24   < > 31*   CREATININE mg/dL 0.73   < > 0.87   ANION GAP mmol/L 8   < > 5   CALCIUM mg/dL 8.8   < > 8.8   ALBUMIN g/dL  --   --  4.1   TOTAL BILIRUBIN mg/dL  --   --  0.44   ALK PHOS U/L  --   --  81   ALT U/L  --   --  23   AST U/L  --   --  29   GLUCOSE RANDOM mg/dL 73   < > 104    < > = values in this interval not displayed.     Results from last 7 days   Lab Units 07/05/24  1234   INR  0.90     Results from last 7 days   Lab Units 07/10/24  0514 07/09/24  2326 07/09/24  1246 07/09/24  0533 07/08/24  0548 07/08/24  0043 07/07/24  1210 07/07/24  0549 07/07/24  0010 07/06/24  1814 07/06/24  1156 07/06/24  0424   POC GLUCOSE mg/dl 76 77 85 85 84 93 101 95 103 131 97 98         Results from last 7 days   Lab Units 07/05/24  0554 07/04/24  0529 07/03/24  1715   LACTIC ACID mmol/L  --   --  0.8   PROCALCITONIN ng/ml 0.10 0.21 0.26*           * I Have Reviewed All Lab Data Listed Above.  * Additional Pertinent Lab Tests Reviewed: All Labs For Current Hospital Admission Reviewed    Mobility:  Basic Mobility Inpatient Raw Score: 8  JH-M  Goal: 3: Sit at edge of bed  [facility] Achieved: 3: Sit at edge of bed    Lines:   Invasive Devices       Peripheral Intravenous Line  Duration             Peripheral IV 07/09/24 Proximal;Right;Ventral (anterior) Forearm 1 day                       Imaging:    Imaging Reports Reviewed Today Include: mri brain    Recent Cultures (last 7 days):     Results from last 7 days   Lab Units 07/06/24  1639 07/03/24  2133 07/03/24  1330   BLOOD CULTURE   --   --  No Growth After 5 Days.  No Growth After 5 Days.   GRAM STAIN RESULT  1+ Mononuclear Cells  No organisms seen  --   --    URINE CULTURE   --  40,000-49,000 cfu/ml  --        Last 24 Hours Medication List:   Current Facility-Administered Medications   Medication Dose Route Frequency Provider Last Rate    acetaminophen  650 mg Oral Q4H PRN JOSE ELIAS Arreola      albuterol  2 puff Inhalation Q4H PRN JOSE ELIAS Arreola      aluminum-magnesium hydroxide-simethicone  30 mL Oral Q4H PRN JOSE ELIAS Arreola      cyanocobalamin  1,000 mcg Oral Daily JOSE ELIAS Arreola      enoxaparin  40 mg Subcutaneous Q24H [facility] Girish Vargas, DO      fluticasone  1 puff Inhalation Daily JOSE ELIAS Arreola      hydrALAZINE  10 mg Intravenous Q6H PRN JOSE ELIAS Arreola      hydrOXYzine HCL  50 mg Oral Q6H PRN Max 4/day JOSE ELIAS Arreola      insulin lispro  1-5 Units Subcutaneous Q6H Girish Vargas, DO      LORazepam  1 mg Intravenous Q4H Girish Vargas, DO      melatonin  3 mg Oral HS JOSE ELIAS Arreola      nicotine polacrilex  4 mg Oral Q2H PRN JOSE ELIAS Arreola      ondansetron  4 mg Oral Q8H PRN JOSE ELIAS Arreola      polyethylene glycol  17 g Oral Daily JOSE ELIAS Arreola      senna  2 tablet Oral HS JOSE ELIAS Arreola      senna-docusate sodium  1 tablet Oral Daily PRN JOSE ELIAS Arreola      thiamine  500 mg Intravenous TID Girish Vargas,  mg (07/10/24 0822)    traZODone  50 mg Oral HS PRN  JOSE ELIAS Arreola          Today, Patient Was Seen By: Tip Jama MD    ** Please Note: Dictation voice to text software may have been used in the creation of this document. **

## 2024-07-10 NOTE — PROGRESS NOTES
"Patient removed supplemental oxygen desatting to mid 80's. RN attempted to reapply and patient became resistive and removed NC again stating \" I don't need oxygen\". RN attempted to provide education but efforts ineffective, no evidence of retaining information d/t pt current state. Patient currently ranging from 89-91% on RA. No s/s of any respiratory distress at this time.   "

## 2024-07-10 NOTE — PLAN OF CARE
Problem: Prexisting or High Potential for Compromised Skin Integrity  Goal: Skin integrity is maintained or improved  Description: INTERVENTIONS:  - Identify patients at risk for skin breakdown  - Assess and monitor skin integrity  - Assess and monitor nutrition and hydration status  - Monitor labs   - Assess for incontinence   - Turn and reposition patient  - Assist with mobility/ambulation  - Relieve pressure over bony prominences  - Avoid friction and shearing  - Provide appropriate hygiene as needed including keeping skin clean and dry  - Evaluate need for skin moisturizer/barrier cream  - Collaborate with interdisciplinary team   - Patient/family teaching  - Consider wound care consult   Outcome: Progressing     Problem: MOBILITY - ADULT  Goal: Maintain or return to baseline ADL function  Description: INTERVENTIONS:  -  Assess patient's ability to carry out ADLs; assess patient's baseline for ADL function and identify physical deficits which impact ability to perform ADLs (bathing, care of mouth/teeth, toileting, grooming, dressing, etc.)  - Assess/evaluate cause of self-care deficits   - Assess range of motion  - Assess patient's mobility; develop plan if impaired  - Assess patient's need for assistive devices and provide as appropriate  - Encourage maximum independence but intervene and supervise when necessary  - Involve family in performance of ADLs  - Assess for home care needs following discharge   - Consider OT consult to assist with ADL evaluation and planning for discharge  - Provide patient education as appropriate  Outcome: Progressing  Goal: Maintains/Returns to pre admission functional level  Description: INTERVENTIONS:  - Perform AM-PAC 6 Click Basic Mobility/ Daily Activity assessment daily.  - Set and communicate daily mobility goal to care team and patient/family/caregiver.   - Collaborate with rehabilitation services on mobility goals if consulted  - Perform Range of Motion 3 times a day.  -  Reposition patient every 2 hours.  - Dangle patient 2 times a day  - Stand patient 3 times a day  - Ambulate patient 3 times a day  - Out of bed to chair 3 times a day   - Out of bed for meals 3 times a day  - Out of bed for toileting  - Record patient progress and toleration of activity level   Outcome: Progressing     Problem: PAIN - ADULT  Goal: Verbalizes/displays adequate comfort level or baseline comfort level  Description: Interventions:  - Encourage patient to monitor pain and request assistance  - Assess pain using appropriate pain scale  - Administer analgesics based on type and severity of pain and evaluate response  - Implement non-pharmacological measures as appropriate and evaluate response  - Consider cultural and social influences on pain and pain management  - Notify physician/advanced practitioner if interventions unsuccessful or patient reports new pain  Outcome: Progressing     Problem: INFECTION - ADULT  Goal: Absence or prevention of progression during hospitalization  Description: INTERVENTIONS:  - Assess and monitor for signs and symptoms of infection  - Monitor lab/diagnostic results  - Monitor all insertion sites, i.e. indwelling lines, tubes, and drains  - Monitor endotracheal if appropriate and nasal secretions for changes in amount and color  - [place] appropriate cooling/warming therapies per order  - Administer medications as ordered  - Instruct and encourage patient and family to use good hand hygiene technique  - Identify and instruct in appropriate isolation precautions for identified infection/condition  Outcome: Progressing     Problem: SAFETY ADULT  Goal: Maintain or return to baseline ADL function  Description: INTERVENTIONS:  -  Assess patient's ability to carry out ADLs; assess patient's baseline for ADL function and identify physical deficits which impact ability to perform ADLs (bathing, care of mouth/teeth, toileting, grooming, dressing, etc.)  - Assess/evaluate cause of  self-care deficits   - Assess range of motion  - Assess patient's mobility; develop plan if impaired  - Assess patient's need for assistive devices and provide as appropriate  - Encourage maximum independence but intervene and supervise when necessary  - Involve family in performance of ADLs  - Assess for home care needs following discharge   - Consider OT consult to assist with ADL evaluation and planning for discharge  - Provide patient education as appropriate  Outcome: Progressing  Goal: Maintains/Returns to pre admission functional level  Description: INTERVENTIONS:  - Perform AM-PAC 6 Click Basic Mobility/ Daily Activity assessment daily.  - Set and communicate daily mobility goal to care team and patient/family/caregiver.   - Collaborate with rehabilitation services on mobility goals if consulted  - Perform Range of Motion 3 times a day.  - Reposition patient every 2 hours.  - Dangle patient 2 times a day  - Stand patient 3 times a day  - Ambulate patient 3 times a day  - Out of bed to chair 3 times a day   - Out of bed for meals 3 times a day  - Out of bed for toileting  - Record patient progress and toleration of activity level   Outcome: Progressing  Goal: Patient will remain free of falls  Description: INTERVENTIONS:  - Educate patient/family on patient safety including physical limitations  - Instruct patient to call for assistance with activity   - Consult OT/PT to assist with strengthening/mobility   - Keep Call bell within reach  - Keep bed low and locked with side rails adjusted as appropriate  - Keep care items and personal belongings within reach  - Initiate and maintain comfort rounds  - Make Fall Risk Sign visible to staff  - Apply yellow socks and bracelet for high fall risk patients  - Consider moving patient to room near nurses station  Outcome: Progressing     Problem: DISCHARGE PLANNING  Goal: Discharge to home or other facility with appropriate resources  Description: INTERVENTIONS:  -  Identify barriers to discharge w/patient and caregiver  - Arrange for needed discharge resources and transportation as appropriate  - Identify discharge learning needs (meds, wound care, etc.)  - Arrange for interpretive services to assist at discharge as needed  - Refer to Case Management Department for coordinating discharge planning if the patient needs post-hospital services based on physician/advanced practitioner order or complex needs related to functional status, cognitive ability, or social support system  Outcome: Progressing     Problem: Knowledge Deficit  Goal: Patient/family/caregiver demonstrates understanding of disease process, treatment plan, medications, and discharge instructions  Description: Complete learning assessment and assess knowledge base.  Interventions:  - Provide teaching at level of understanding  - Provide teaching via preferred learning methods  Outcome: Progressing     Problem: Nutrition/Hydration-ADULT  Goal: Nutrient/Hydration intake appropriate for improving, restoring or maintaining nutritional needs  Description: Monitor and assess patient's nutrition/hydration status for malnutrition. Collaborate with interdisciplinary team and initiate plan and interventions as ordered.  Monitor patient's weight and dietary intake as ordered or per policy. Utilize nutrition screening tool and intervene as necessary. Determine patient's food preferences and provide high-protein, high-caloric foods as appropriate.     INTERVENTIONS:  - Monitor oral intake, urinary output, labs, and treatment plans  - Assess nutrition and hydration status and recommend course of action  - Evaluate amount of meals eaten  - Assist patient with eating if necessary   - Allow adequate time for meals  - Recommend/ encourage appropriate diets, oral nutritional supplements, and vitamin/mineral supplements  - Order, calculate, and assess calorie counts as needed  - Recommend, monitor, and adjust tube feedings and  TPN/PPN based on assessed needs  - Assess need for intravenous fluids  - Provide specific nutrition/hydration education as appropriate  - Include patient/family/caregiver in decisions related to nutrition  Outcome: Progressing     Problem: DECISION MAKING  Goal: Pt/Family able to effectively weigh alternatives and participate in decision making related to treatment and care  Description: INTERVENTIONS:  - Identify decision maker  - Determine when there are differences among patient's view, family's view, and healthcare provider's view of patient condition and care goals  - Facilitate patient/family articulation of goals for care  - Help patient/family identify pros/cons of alternative solutions  - Provide information as requested by patient/family  - Respect patient/family rights related to privacy and sharing information   - Serve as a liaison between patient, family and health care team  - Initiate consults as appropriate (Ethics Team, Palliative Care, Family Care Conference, etc.)  Outcome: Progressing     Problem: CONFUSION/THOUGHT DISTURBANCE  Goal: Thought disturbances (confusion, delirium, depression, dementia or psychosis) are managed to maintain or return to baseline mental status and functional level  Description: INTERVENTIONS:  - Assess for possible contributors to  thought disturbance, including but not limited to medications, infection, impaired vision or hearing, underlying metabolic abnormalities, dehydration, respiratory compromise,  psychiatric diagnoses and notify attending PHYSICAN/AP  - Monitor and intervene to maintain adequate nutrition, hydration, elimination, sleep and activity  - Decrease environmental stimuli, including noise as appropriate.  - Provide frequent contacts to provide refocusing, direction and reassurance as needed. Approach patient calmly with eye contact and at their level.  - [place] high risk fall precautions, aspiration precautions and other safety measures, as  indicated  - If delirium suspected, notify physician/AP of change in condition and request immediate in-person evaluation  - Pursue consults as appropriate including Geriatric (campus dependent), OT for cognitive evaluation/activity planning, psychiatric, pastoral care, etc.  Outcome: Progressing

## 2024-07-11 ENCOUNTER — HOSPITAL ENCOUNTER (INPATIENT)
Facility: HOSPITAL | Age: 59
LOS: 2 days | DRG: 885 | End: 2024-07-13
Attending: STUDENT IN AN ORGANIZED HEALTH CARE EDUCATION/TRAINING PROGRAM | Admitting: STUDENT IN AN ORGANIZED HEALTH CARE EDUCATION/TRAINING PROGRAM
Payer: MEDICARE

## 2024-07-11 VITALS
BODY MASS INDEX: 36.98 KG/M2 | DIASTOLIC BLOOD PRESSURE: 92 MMHG | WEIGHT: 244 LBS | HEART RATE: 73 BPM | OXYGEN SATURATION: 92 % | TEMPERATURE: 99 F | HEIGHT: 68 IN | RESPIRATION RATE: 22 BRPM | SYSTOLIC BLOOD PRESSURE: 160 MMHG

## 2024-07-11 DIAGNOSIS — Z00.8 MEDICAL CLEARANCE FOR PSYCHIATRIC ADMISSION: ICD-10-CM

## 2024-07-11 DIAGNOSIS — F25.0 SCHIZOAFFECTIVE DISORDER, BIPOLAR TYPE (HCC): Primary | ICD-10-CM

## 2024-07-11 PROBLEM — K76.0 HEPATIC STEATOSIS: Status: ACTIVE | Noted: 2020-03-10

## 2024-07-11 PROBLEM — B00.9 HSV-2 INFECTION: Status: ACTIVE | Noted: 2017-01-20

## 2024-07-11 PROBLEM — K43.2 INCISIONAL HERNIA, WITHOUT OBSTRUCTION OR GANGRENE: Status: ACTIVE | Noted: 2022-09-19

## 2024-07-11 PROBLEM — M79.18 RIGHT BUTTOCK PAIN: Status: ACTIVE | Noted: 2021-08-27

## 2024-07-11 PROBLEM — N95.0 POSTMENOPAUSAL BLEEDING: Status: ACTIVE | Noted: 2021-04-09

## 2024-07-11 PROBLEM — M62.08 DIASTASIS RECTI: Status: ACTIVE | Noted: 2022-09-19

## 2024-07-11 PROBLEM — M19.90 ARTHRITIS: Status: ACTIVE | Noted: 2024-07-11

## 2024-07-11 PROBLEM — C54.1 ENDOMETRIAL CANCER (HCC): Status: ACTIVE | Noted: 2021-05-18

## 2024-07-11 LAB
GLUCOSE SERPL-MCNC: 80 MG/DL (ref 65–140)
GLUCOSE SERPL-MCNC: 81 MG/DL (ref 65–140)
GLUCOSE SERPL-MCNC: 83 MG/DL (ref 65–140)
GLUCOSE SERPL-MCNC: 84 MG/DL (ref 65–140)
GLUCOSE SERPL-MCNC: 87 MG/DL (ref 65–140)
GLUCOSE SERPL-MCNC: 91 MG/DL (ref 65–140)

## 2024-07-11 PROCEDURE — 93005 ELECTROCARDIOGRAM TRACING: CPT

## 2024-07-11 PROCEDURE — 99239 HOSP IP/OBS DSCHRG MGMT >30: CPT | Performed by: STUDENT IN AN ORGANIZED HEALTH CARE EDUCATION/TRAINING PROGRAM

## 2024-07-11 PROCEDURE — 82948 REAGENT STRIP/BLOOD GLUCOSE: CPT

## 2024-07-11 RX ORDER — HYDROXYZINE HYDROCHLORIDE 25 MG/1
50 TABLET, FILM COATED ORAL
Status: CANCELLED | OUTPATIENT
Start: 2024-07-11

## 2024-07-11 RX ORDER — QUETIAPINE FUMARATE 50 MG/1
50 TABLET, FILM COATED ORAL
Status: DISCONTINUED | OUTPATIENT
Start: 2024-07-11 | End: 2024-07-13 | Stop reason: HOSPADM

## 2024-07-11 RX ORDER — IBUPROFEN 400 MG/1
400 TABLET ORAL EVERY 6 HOURS PRN
Status: DISCONTINUED | OUTPATIENT
Start: 2024-07-11 | End: 2024-07-13 | Stop reason: HOSPADM

## 2024-07-11 RX ORDER — HYDROXYZINE 50 MG/1
50 TABLET, FILM COATED ORAL
Status: DISCONTINUED | OUTPATIENT
Start: 2024-07-11 | End: 2024-07-13 | Stop reason: HOSPADM

## 2024-07-11 RX ORDER — HYDROXYZINE HYDROCHLORIDE 25 MG/1
25 TABLET, FILM COATED ORAL
Status: CANCELLED | OUTPATIENT
Start: 2024-07-11

## 2024-07-11 RX ORDER — OLANZAPINE 10 MG/2ML
5 INJECTION, POWDER, FOR SOLUTION INTRAMUSCULAR
Status: DISCONTINUED | OUTPATIENT
Start: 2024-07-11 | End: 2024-07-13 | Stop reason: HOSPADM

## 2024-07-11 RX ORDER — QUETIAPINE FUMARATE 25 MG/1
25 TABLET, FILM COATED ORAL
Status: DISCONTINUED | OUTPATIENT
Start: 2024-07-11 | End: 2024-07-13 | Stop reason: HOSPADM

## 2024-07-11 RX ORDER — LANOLIN ALCOHOL/MO/W.PET/CERES
3 CREAM (GRAM) TOPICAL
Status: DISCONTINUED | OUTPATIENT
Start: 2024-07-11 | End: 2024-07-13 | Stop reason: HOSPADM

## 2024-07-11 RX ORDER — LORAZEPAM 1 MG/1
1 TABLET ORAL
Status: DISCONTINUED | OUTPATIENT
Start: 2024-07-11 | End: 2024-07-13 | Stop reason: HOSPADM

## 2024-07-11 RX ORDER — AMOXICILLIN 250 MG
1 CAPSULE ORAL DAILY PRN
Status: CANCELLED | OUTPATIENT
Start: 2024-07-11

## 2024-07-11 RX ORDER — QUETIAPINE FUMARATE 25 MG/1
25 TABLET, FILM COATED ORAL
Status: CANCELLED | OUTPATIENT
Start: 2024-07-11

## 2024-07-11 RX ORDER — WATER 10 ML/10ML
INJECTION INTRAMUSCULAR; INTRAVENOUS; SUBCUTANEOUS
Status: COMPLETED
Start: 2024-07-11 | End: 2024-07-11

## 2024-07-11 RX ORDER — LANOLIN ALCOHOL/MO/W.PET/CERES
3 CREAM (GRAM) TOPICAL
Status: CANCELLED | OUTPATIENT
Start: 2024-07-11

## 2024-07-11 RX ORDER — TRAZODONE HYDROCHLORIDE 50 MG/1
50 TABLET ORAL
Status: DISCONTINUED | OUTPATIENT
Start: 2024-07-11 | End: 2024-07-13 | Stop reason: HOSPADM

## 2024-07-11 RX ORDER — QUETIAPINE FUMARATE 100 MG/1
100 TABLET, FILM COATED ORAL
Status: CANCELLED | OUTPATIENT
Start: 2024-07-11

## 2024-07-11 RX ORDER — IBUPROFEN 600 MG/1
600 TABLET ORAL EVERY 8 HOURS PRN
Status: DISCONTINUED | OUTPATIENT
Start: 2024-07-11 | End: 2024-07-13 | Stop reason: HOSPADM

## 2024-07-11 RX ORDER — IBUPROFEN 600 MG/1
600 TABLET ORAL EVERY 8 HOURS PRN
Status: CANCELLED | OUTPATIENT
Start: 2024-07-11

## 2024-07-11 RX ORDER — LORAZEPAM 2 MG/ML
1 INJECTION INTRAMUSCULAR
Status: CANCELLED | OUTPATIENT
Start: 2024-07-11

## 2024-07-11 RX ORDER — POLYETHYLENE GLYCOL 3350 17 G/17G
17 POWDER, FOR SOLUTION ORAL DAILY PRN
Status: CANCELLED | OUTPATIENT
Start: 2024-07-11

## 2024-07-11 RX ORDER — HYDROXYZINE HYDROCHLORIDE 25 MG/1
25 TABLET, FILM COATED ORAL
Status: DISCONTINUED | OUTPATIENT
Start: 2024-07-11 | End: 2024-07-13 | Stop reason: HOSPADM

## 2024-07-11 RX ORDER — IBUPROFEN 400 MG/1
200 TABLET ORAL EVERY 6 HOURS PRN
Status: DISCONTINUED | OUTPATIENT
Start: 2024-07-11 | End: 2024-07-13 | Stop reason: HOSPADM

## 2024-07-11 RX ORDER — IBUPROFEN 400 MG/1
400 TABLET ORAL EVERY 6 HOURS PRN
Status: CANCELLED | OUTPATIENT
Start: 2024-07-11

## 2024-07-11 RX ORDER — OLANZAPINE 10 MG/2ML
5 INJECTION, POWDER, FOR SOLUTION INTRAMUSCULAR
Status: CANCELLED | OUTPATIENT
Start: 2024-07-11

## 2024-07-11 RX ORDER — TRAZODONE HYDROCHLORIDE 50 MG/1
50 TABLET ORAL
Status: CANCELLED | OUTPATIENT
Start: 2024-07-11

## 2024-07-11 RX ORDER — LORAZEPAM 1 MG/1
1 TABLET ORAL
Status: CANCELLED | OUTPATIENT
Start: 2024-07-11

## 2024-07-11 RX ORDER — AMOXICILLIN 250 MG
1 CAPSULE ORAL DAILY PRN
Status: DISCONTINUED | OUTPATIENT
Start: 2024-07-11 | End: 2024-07-13 | Stop reason: HOSPADM

## 2024-07-11 RX ORDER — QUETIAPINE FUMARATE 100 MG/1
100 TABLET, FILM COATED ORAL
Status: DISCONTINUED | OUTPATIENT
Start: 2024-07-11 | End: 2024-07-13 | Stop reason: HOSPADM

## 2024-07-11 RX ORDER — LORAZEPAM 2 MG/ML
1 INJECTION INTRAMUSCULAR
Status: DISCONTINUED | OUTPATIENT
Start: 2024-07-11 | End: 2024-07-13 | Stop reason: HOSPADM

## 2024-07-11 RX ORDER — QUETIAPINE FUMARATE 25 MG/1
50 TABLET, FILM COATED ORAL
Status: CANCELLED | OUTPATIENT
Start: 2024-07-11

## 2024-07-11 RX ORDER — POLYETHYLENE GLYCOL 3350 17 G/17G
17 POWDER, FOR SOLUTION ORAL DAILY PRN
Status: DISCONTINUED | OUTPATIENT
Start: 2024-07-11 | End: 2024-07-13 | Stop reason: HOSPADM

## 2024-07-11 RX ORDER — IBUPROFEN 200 MG
200 TABLET ORAL EVERY 6 HOURS PRN
Status: CANCELLED | OUTPATIENT
Start: 2024-07-11

## 2024-07-11 RX ADMIN — POLYETHYLENE GLYCOL 3350 17 G: 17 POWDER, FOR SOLUTION ORAL at 08:38

## 2024-07-11 RX ADMIN — LORAZEPAM 1 MG: 2 INJECTION INTRAMUSCULAR; INTRAVENOUS at 12:51

## 2024-07-11 RX ADMIN — LORAZEPAM 1 MG: 2 INJECTION INTRAMUSCULAR; INTRAVENOUS at 04:34

## 2024-07-11 RX ADMIN — LORAZEPAM 1 MG: 2 INJECTION INTRAMUSCULAR; INTRAVENOUS at 08:38

## 2024-07-11 RX ADMIN — CYANOCOBALAMIN TAB 500 MCG 1000 MCG: 500 TAB at 08:38

## 2024-07-11 RX ADMIN — ENOXAPARIN SODIUM 40 MG: 40 INJECTION SUBCUTANEOUS at 08:38

## 2024-07-11 RX ADMIN — FLUTICASONE FUROATE 1 PUFF: 100 POWDER RESPIRATORY (INHALATION) at 08:38

## 2024-07-11 RX ADMIN — OLANZAPINE 5 MG: 10 INJECTION, POWDER, FOR SOLUTION INTRAMUSCULAR at 23:27

## 2024-07-11 RX ADMIN — WATER 1.2 ML: 1 INJECTION, SOLUTION INTRAMUSCULAR; INTRAVENOUS; SUBCUTANEOUS at 23:27

## 2024-07-11 RX ADMIN — LORAZEPAM 1 MG: 2 INJECTION INTRAMUSCULAR; INTRAVENOUS at 01:25

## 2024-07-11 NOTE — NURSING NOTE
Patient discharged to [facility] at this time with all belongings. Transported via bls.  302 paperwork and medical neccesity sent with transport.
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
## 2024-07-11 NOTE — ASSESSMENT & PLAN NOTE
Background:  Patient is a 302 at [facility] for acute psychotic episode.  She was originally brought to [facility] after [employer] found her confused on a park bench.  She met with crisis and lacked capacity to sign a 201. A 302 was upheld and she was transferred to [facility].  During admission, she was noted semicatatonic and somewhat rigid concerning for NMS versus catatonia versus cholinergic rebound syndrome.  She met SIRS criteria with tachycardia and leukocytosis.  Transferred to [facility] for neurologic assessment, LP and MRI to rule encephalitis/meningitis    We were instructed not restart antipsychotic medications, avoid serotonergic or anticholinergic agents while she was in here.  Further management per inpatient psych

## 2024-07-11 NOTE — PLAN OF CARE
Problem: Prexisting or High Potential for Compromised Skin Integrity  Goal: Skin integrity is maintained or improved  Description: INTERVENTIONS:  - Identify patients at risk for skin breakdown  - Assess and monitor skin integrity  - Assess and monitor nutrition and hydration status  - Monitor labs   - Assess for incontinence   - Turn and reposition patient  - Assist with mobility/ambulation  - Relieve pressure over bony prominences  - Avoid friction and shearing  - Provide appropriate hygiene as needed including keeping skin clean and dry  - Evaluate need for skin moisturizer/barrier cream  - Collaborate with interdisciplinary team   - Patient/family teaching  - Consider wound care consult   7/11/2024 1454 by Tashia Mack RN  Outcome: Adequate for Discharge  7/11/2024 0946 by Tashia Mack RN  Outcome: Progressing     Problem: MOBILITY - ADULT  Goal: Maintain or return to baseline ADL function  Description: INTERVENTIONS:  -  Assess patient's ability to carry out ADLs; assess patient's baseline for ADL function and identify physical deficits which impact ability to perform ADLs (bathing, care of mouth/teeth, toileting, grooming, dressing, etc.)  - Assess/evaluate cause of self-care deficits   - Assess range of motion  - Assess patient's mobility; develop plan if impaired  - Assess patient's need for assistive devices and provide as appropriate  - Encourage maximum independence but intervene and supervise when necessary  - Involve family in performance of ADLs  - Assess for home care needs following discharge   - Consider OT consult to assist with ADL evaluation and planning for discharge  - Provide patient education as appropriate  7/11/2024 1454 by Tashia Mack RN  Outcome: Adequate for Discharge  7/11/2024 0946 by Tashia Mack RN  Outcome: Progressing  Goal: Maintains/Returns to pre admission functional level  Description: INTERVENTIONS:  - Perform AM-PAC 6 Click Basic Mobility/ Daily Activity assessment  daily.  - Set and communicate daily mobility goal to care team and patient/family/caregiver.   - Collaborate with rehabilitation services on mobility goals if consulted  - Perform Range of Motion 3 times a day.  - Reposition patient every 2 hours.  - Dangle patient 2 times a day  - Stand patient 3 times a day  - Ambulate patient 3 times a day  - Out of bed to chair 3 times a day   - Out of bed for meals 3 times a day  - Out of bed for toileting  - Record patient progress and toleration of activity level   7/11/2024 1454 by Tashia Mack RN  Outcome: Adequate for Discharge  7/11/2024 0946 by Tashia Mack RN  Outcome: Not Progressing     Problem: PAIN - ADULT  Goal: Verbalizes/displays adequate comfort level or baseline comfort level  Description: Interventions:  - Encourage patient to monitor pain and request assistance  - Assess pain using appropriate pain scale  - Administer analgesics based on type and severity of pain and evaluate response  - Implement non-pharmacological measures as appropriate and evaluate response  - Consider cultural and social influences on pain and pain management  - Notify physician/advanced practitioner if interventions unsuccessful or patient reports new pain  7/11/2024 1454 by Tashia Mack RN  Outcome: Adequate for Discharge  7/11/2024 0946 by Tashia Mack RN  Outcome: Progressing     Problem: INFECTION - ADULT  Goal: Absence or prevention of progression during hospitalization  Description: INTERVENTIONS:  - Assess and monitor for signs and symptoms of infection  - Monitor lab/diagnostic results  - Monitor all insertion sites, i.e. indwelling lines, tubes, and drains  - Monitor endotracheal if appropriate and nasal secretions for changes in amount and color  - [place] appropriate cooling/warming therapies per order  - Administer medications as ordered  - Instruct and encourage patient and family to use good hand hygiene technique  - Identify and instruct in appropriate isolation  precautions for identified infection/condition  7/11/2024 1454 by Tashia Mack RN  Outcome: Adequate for Discharge  7/11/2024 0946 by Tashia Mack RN  Outcome: Progressing     Problem: SAFETY ADULT  Goal: Maintain or return to baseline ADL function  Description: INTERVENTIONS:  -  Assess patient's ability to carry out ADLs; assess patient's baseline for ADL function and identify physical deficits which impact ability to perform ADLs (bathing, care of mouth/teeth, toileting, grooming, dressing, etc.)  - Assess/evaluate cause of self-care deficits   - Assess range of motion  - Assess patient's mobility; develop plan if impaired  - Assess patient's need for assistive devices and provide as appropriate  - Encourage maximum independence but intervene and supervise when necessary  - Involve family in performance of ADLs  - Assess for home care needs following discharge   - Consider OT consult to assist with ADL evaluation and planning for discharge  - Provide patient education as appropriate  7/11/2024 1454 by Tashia Makc RN  Outcome: Adequate for Discharge  7/11/2024 0946 by Tashia Mack RN  Outcome: Progressing  Goal: Maintains/Returns to pre admission functional level  Description: INTERVENTIONS:  - Perform AM-PAC 6 Click Basic Mobility/ Daily Activity assessment daily.  - Set and communicate daily mobility goal to care team and patient/family/caregiver.   - Collaborate with rehabilitation services on mobility goals if consulted  - Perform Range of Motion 3 times a day.  - Reposition patient every 2 hours.  - Dangle patient 2 times a day  - Stand patient 3 times a day  - Ambulate patient 3 times a day  - Out of bed to chair 3 times a day   - Out of bed for meals 3 times a day  - Out of bed for toileting  - Record patient progress and toleration of activity level   7/11/2024 1454 by Tashia Mack RN  Outcome: Adequate for Discharge  7/11/2024 0946 by Tashia Mack RN  Outcome: Not Progressing  Goal: Patient will  remain free of falls  Description: INTERVENTIONS:  - Educate patient/family on patient safety including physical limitations  - Instruct patient to call for assistance with activity   - Consult OT/PT to assist with strengthening/mobility   - Keep Call bell within reach  - Keep bed low and locked with side rails adjusted as appropriate  - Keep care items and personal belongings within reach  - Initiate and maintain comfort rounds  - Apply yellow socks and bracelet for high fall risk patients  - Consider moving patient to room near nurses station  7/11/2024 1454 by Tashia Mack RN  Outcome: Adequate for Discharge  7/11/2024 0946 by Tashia Mack RN  Outcome: Progressing     Problem: DISCHARGE PLANNING  Goal: Discharge to home or other facility with appropriate resources  Description: INTERVENTIONS:  - Identify barriers to discharge w/patient and caregiver  - Arrange for needed discharge resources and transportation as appropriate  - Identify discharge learning needs (meds, wound care, etc.)  - Arrange for interpretive services to assist at discharge as needed  - Refer to Case Management Department for coordinating discharge planning if the patient needs post-hospital services based on physician/advanced practitioner order or complex needs related to functional status, cognitive ability, or social support system  7/11/2024 1454 by Tashia Mack RN  Outcome: Adequate for Discharge  7/11/2024 0946 by Tashia Mack RN  Outcome: Progressing     Problem: Knowledge Deficit  Goal: Patient/family/caregiver demonstrates understanding of disease process, treatment plan, medications, and discharge instructions  Description: Complete learning assessment and assess knowledge base.  Interventions:  - Provide teaching at level of understanding  - Provide teaching via preferred learning methods  7/11/2024 1454 by Tashia Mack RN  Outcome: Adequate for Discharge  7/11/2024 0946 by Tashia Mack RN  Outcome: Progressing     Problem:  Nutrition/Hydration-ADULT  Goal: Nutrient/Hydration intake appropriate for improving, restoring or maintaining nutritional needs  Description: Monitor and assess patient's nutrition/hydration status for malnutrition. Collaborate with interdisciplinary team and initiate plan and interventions as ordered.  Monitor patient's weight and dietary intake as ordered or per policy. Utilize nutrition screening tool and intervene as necessary. Determine patient's food preferences and provide high-protein, high-caloric foods as appropriate.     INTERVENTIONS:  - Monitor oral intake, urinary output, labs, and treatment plans  - Assess nutrition and hydration status and recommend course of action  - Evaluate amount of meals eaten  - Assist patient with eating if necessary   - Allow adequate time for meals  - Recommend/ encourage appropriate diets, oral nutritional supplements, and vitamin/mineral supplements  - Order, calculate, and assess calorie counts as needed  - Recommend, monitor, and adjust tube feedings and TPN/PPN based on assessed needs  - Assess need for intravenous fluids  - Provide specific nutrition/hydration education as appropriate  - Include patient/family/caregiver in decisions related to nutrition  7/11/2024 1454 by Tashia Mack RN  Outcome: Adequate for Discharge  7/11/2024 0946 by Tashia Mack RN  Outcome: Not Progressing     Problem: DECISION MAKING  Goal: Pt/Family able to effectively weigh alternatives and participate in decision making related to treatment and care  Description: INTERVENTIONS:  - Identify decision maker  - Determine when there are differences among patient's view, family's view, and healthcare provider's view of patient condition and care goals  - Facilitate patient/family articulation of goals for care  - Help patient/family identify pros/cons of alternative solutions  - Provide information as requested by patient/family  - Respect patient/family rights related to privacy and sharing  information   - Serve as a liaison between patient, family and health care team  - Initiate consults as appropriate (Ethics Team, Palliative Care, Family Care Conference, etc.)  7/11/2024 1454 by Tashia Mack RN  Outcome: Adequate for Discharge  7/11/2024 0946 by Tashia Mack RN  Outcome: Not Progressing     Problem: CONFUSION/THOUGHT DISTURBANCE  Goal: Thought disturbances (confusion, delirium, depression, dementia or psychosis) are managed to maintain or return to baseline mental status and functional level  Description: INTERVENTIONS:  - Assess for possible contributors to  thought disturbance, including but not limited to medications, infection, impaired vision or hearing, underlying metabolic abnormalities, dehydration, respiratory compromise,  psychiatric diagnoses and notify attending PHYSICAN/AP  - Monitor and intervene to maintain adequate nutrition, hydration, elimination, sleep and activity  - Decrease environmental stimuli, including noise as appropriate.  - Provide frequent contacts to provide refocusing, direction and reassurance as needed. Approach patient calmly with eye contact and at their level.  - [place] high risk fall precautions, aspiration precautions and other safety measures, as indicated  - If delirium suspected, notify physician/AP of change in condition and request immediate in-person evaluation  - Pursue consults as appropriate including Geriatric (campus dependent), OT for cognitive evaluation/activity planning, psychiatric, pastoral care, etc.  7/11/2024 1454 by Tashia Mack RN  Outcome: Adequate for Discharge  7/11/2024 0946 by Tashia Mack RN  Outcome: Progressing

## 2024-07-11 NOTE — ASSESSMENT & PLAN NOTE
Hypernatremic during her admission at [facility].  Resolved with dextrose infusion. Continue free water intake    Recent Labs     07/09/24  0807 07/10/24  0539   SODIUM [id number]

## 2024-07-11 NOTE — DISCHARGE SUMMARY
[facility]  Discharge- [place] ESPINOZA Nowak 1965, 59 y.o. female MRN: [id number]  Unit/Bed#: [place] [address] -01 Encounter: [id number]  Primary Care Provider: Adriana Bradford MD   Date and time admitted to hospital: 7/5/2024  7:46 PM    * Altered mental status  Assessment & Plan  59 year old female presented with fever, rigidity and altered mental status.  LP perfomed: ME negative. Paraneoplastic panel negative. Autoimmune encephalopathy, and Lyme pending. Cleared by neurology for discharge  MRI brain showed: No acute intracranial pathology. Mild chronic microangiopathy.   Discharged to inpatient psych today    Psychosis (HCC)  Assessment & Plan  Background:  Patient is a 302 at [facility] for acute psychotic episode.  She was originally brought to [facility] after [employer] found her confused on a park bench.  She met with crisis and lacked capacity to sign a 201. A 302 was upheld and she was transferred to [facility].  During admission, she was noted semicatatonic and somewhat rigid concerning for NMS versus catatonia versus cholinergic rebound syndrome.  She met SIRS criteria with tachycardia and leukocytosis.  Transferred to [place] for neurologic assessment, LP and MRI to rule encephalitis/meningitis    We were instructed not restart antipsychotic medications, avoid serotonergic or anticholinergic agents while she was in here.  Further management per inpatient psych      Acute hypernatremia  Assessment & Plan  Hypernatremic during her admission at [place].  Resolved with dextrose infusion. Continue free water intake    Recent Labs     07/09/24  0807 07/10/24  0539   SODIUM 143 146         NICHOLAS (acute kidney injury) (HCC)  Assessment & Plan  Resolved, back to baseline    Recent Labs     07/09/24  0807 07/10/24  0539   BUN 24 24   CREATININE 0.75 0.73   EGFR 88 91                   Reactive airway disease  Assessment & Plan  On room air    Type 2 diabetes mellitus (HCC)  Assessment & Plan  Lab  Results   Component Value Date    HGBA1C 6.4 (H) 05/03/2024     Recent Labs     07/10/24  1620 07/11/24  0002 07/11/24  0608 07/11/24  1136   POCGLU 88 83 87 91       Blood Sugar Average: Last 72 hrs:  (P) 85.20788031067047026    Continue Metformin      Discharging Physician / Practitioner: Tip Jama MD  PCP: Adriana Bradford MD  Admission Date:   Admission Orders (From admission, onward)       Ordered        07/05/24 1947  INPATIENT ADMISSION  Once            Signed and Held  ED TO DIFFERENT CAMPUS [facility] UNIT or INPATIENT MEDICAL UNIT to [facility] UNIT (using Discharge Readmit Navigator) - Admit Patient to IP Behavioral Health Unit  Once                          Discharge Date: 07/11/24    Medical Problems       Resolved Problems  Date Reviewed: 7/11/2024   None         Consultations During Hospital Stay:  Neurology, behavioral health    Procedures Performed:   LP    Significant Findings / Test Results:   Imaging  XR chest:  No acute cardiopulmonary disease.     CT chest abdomen pelvis:  No acute findings in the chest, abdomen or pelvis within the limits of unenhanced technique.     CT head:  No acute intracranial abnormality.    XR chest:  No acute cardiopulmonary disease.     XR follow-up:  No radiopaque orbital foreign body.     MRI brain:  No acute intracranial pathology. Mild chronic microangiopathy.       Echo:      Left Ventricle: Left ventricular cavity size is normal. Wall thickness is increased. There is moderate to severe asymmetric septal hypertrophy. The left ventricular ejection fraction is 65%. Systolic function is vigorous. Wall motion is normal. Diastolic function is mildly abnormal, consistent with grade I (abnormal) relaxation. There is no LV dynamic obstruction at rest.  There is evidence of mild ALEXIS without septal contact.    Right Ventricle: Right ventricular cavity size is normal. Systolic function is normal. Normal tricuspid annular plane systolic excursion (TAPSE) > 1.7 cm.    Left  "Atrium: The atrium is mildly dilated.    Mitral Valve: There is mild annular calcification. There is mild regurgitation.    Pericardium: There is a trivial pericardial effusion.      Test Results Pending at Discharge (will require follow up):   Autoimmune panel, lyme     Outpatient Tests Requested:  Psych admission  Pcp follow-up    Complications:  none    Reason for Admission: psychosis    Hospital Course:     Meli Nowak is a 59 y.o. female patient who originally presented to the hospital on 7/5/2024 due to psychosis.    Patient is a 59-year-old female with a history of schizoaffective disorder bipolar type, and type 2 diabetes mellitus who was brought in here from [place] due to her acute psychotic episode.  There was concern that given her catatonia and rigidity she may benefit from further workup.  Neurology recommended lumbar puncture.  Autoimmune and Lyme was pending, however the rest continues to remain negative.  Neurology cleared her for discharge to [place] for continued psychiatric care and management.  At this point in time, they did not see any need for any empiric treatment or additional neurodiagnostics at this time.  She was accepted by [place] to continue her inpatient psychiatry management.     Please see above list of diagnoses and related plan for additional information.     Condition at Discharge: stable     Discharge Day Visit / Exam:     Subjective:  patient seen and examined at bedside. Had a behavioral episode overnight.  Vitals: Blood Pressure: 160/92 (07/11/24 0713)  Pulse: 73 (07/11/24 0744)  Temperature: 99 °F (37.2 °C) (07/11/24 0713)  Temp Source: Axillary (07/09/24 2133)  Respirations: 22 (07/11/24 0744)  Height: 5' 8\" (172.7 cm) (07/08/24 0932)  Weight - Scale: 111 kg (244 lb) (07/08/24 0932)  SpO2: 92 % (07/11/24 0744)  Exam:   Physical Exam  Vitals reviewed.   Constitutional:       General: She is not in acute distress.  HENT:      Head: Normocephalic.      Nose: " Nose normal.      Mouth/Throat:      Mouth: Mucous membranes are moist.   Eyes:      General: No scleral icterus.  Cardiovascular:      Rate and Rhythm: Normal rate.   Pulmonary:      Effort: Pulmonary effort is normal. No respiratory distress.   Abdominal:      Palpations: Abdomen is soft.      Tenderness: There is no abdominal tenderness.   Skin:     General: Skin is warm.   Neurological:      Mental Status: She is alert.   Psychiatric:      Comments: calm       Discussion with Family: called [facility]    Discharge instructions/Information to patient and family:   See after visit summary for information provided to patient and family.      Provisions for Follow-Up Care:  See after visit summary for information related to follow-up care and any pertinent home health orders.      Disposition:     Ip psych    Planned Readmission: no     Discharge Statement:  I spent 40 minutes discharging the patient. This time was spent on the day of discharge. I had direct contact with the patient on the day of discharge. Greater than 50% of the total time was spent examining patient, answering all patient questions, arranging and discussing plan of care with patient as well as directly providing post-discharge instructions.  Additional time then spent on discharge activities.    Discharge Medications:  See after visit summary for reconciled discharge medications provided to patient and family.      ** Please Note: This note has been constructed using a voice recognition system **

## 2024-07-11 NOTE — ASSESSMENT & PLAN NOTE
59 year old female presented with fever, rigidity and altered mental status.  LP perfomed: ME negative. Paraneoplastic panel negative. Autoimmune encephalopathy, and Lyme pending. Cleared by neurology for discharge  MRI brain showed: No acute intracranial pathology. Mild chronic microangiopathy.   Discharged to inpatient psych today

## 2024-07-11 NOTE — ED NOTES
Patient is accepted at [facility].  Patient is accepted by Dr. Vin Long in Intake.    Transportation is arranged with BLS.  Transportation is scheduled for TBD.  Patient may go to the floor at 1400.      Nurse report is to be called to [phone] prior to patient transfer.     Wilner Wang  Crisis Intervention Specialist II  07/11/24

## 2024-07-11 NOTE — PLAN OF CARE
Problem: Prexisting or High Potential for Compromised Skin Integrity  Goal: Skin integrity is maintained or improved  Description: INTERVENTIONS:  - Identify patients at risk for skin breakdown  - Assess and monitor skin integrity  - Assess and monitor nutrition and hydration status  - Monitor labs   - Assess for incontinence   - Turn and reposition patient  - Assist with mobility/ambulation  - Relieve pressure over bony prominences  - Avoid friction and shearing  - Provide appropriate hygiene as needed including keeping skin clean and dry  - Evaluate need for skin moisturizer/barrier cream  - Collaborate with interdisciplinary team   - Patient/family teaching  - Consider wound care consult   Outcome: Progressing     Problem: MOBILITY - ADULT  Goal: Maintain or return to baseline ADL function  Description: INTERVENTIONS:  -  Assess patient's ability to carry out ADLs; assess patient's baseline for ADL function and identify physical deficits which impact ability to perform ADLs (bathing, care of mouth/teeth, toileting, grooming, dressing, etc.)  - Assess/evaluate cause of self-care deficits   - Assess range of motion  - Assess patient's mobility; develop plan if impaired  - Assess patient's need for assistive devices and provide as appropriate  - Encourage maximum independence but intervene and supervise when necessary  - Involve family in performance of ADLs  - Assess for home care needs following discharge   - Consider OT consult to assist with ADL evaluation and planning for discharge  - Provide patient education as appropriate  Outcome: Progressing     Problem: PAIN - ADULT  Goal: Verbalizes/displays adequate comfort level or baseline comfort level  Description: Interventions:  - Encourage patient to monitor pain and request assistance  - Assess pain using appropriate pain scale  - Administer analgesics based on type and severity of pain and evaluate response  - Implement non-pharmacological measures as  appropriate and evaluate response  - Consider cultural and social influences on pain and pain management  - Notify physician/advanced practitioner if interventions unsuccessful or patient reports new pain  Outcome: Progressing     Problem: INFECTION - ADULT  Goal: Absence or prevention of progression during hospitalization  Description: INTERVENTIONS:  - Assess and monitor for signs and symptoms of infection  - Monitor lab/diagnostic results  - Monitor all insertion sites, i.e. indwelling lines, tubes, and drains  - Monitor endotracheal if appropriate and nasal secretions for changes in amount and color  - [place] appropriate cooling/warming therapies per order  - Administer medications as ordered  - Instruct and encourage patient and family to use good hand hygiene technique  - Identify and instruct in appropriate isolation precautions for identified infection/condition  Outcome: Progressing     Problem: SAFETY ADULT  Goal: Maintain or return to baseline ADL function  Description: INTERVENTIONS:  -  Assess patient's ability to carry out ADLs; assess patient's baseline for ADL function and identify physical deficits which impact ability to perform ADLs (bathing, care of mouth/teeth, toileting, grooming, dressing, etc.)  - Assess/evaluate cause of self-care deficits   - Assess range of motion  - Assess patient's mobility; develop plan if impaired  - Assess patient's need for assistive devices and provide as appropriate  - Encourage maximum independence but intervene and supervise when necessary  - Involve family in performance of ADLs  - Assess for home care needs following discharge   - Consider OT consult to assist with ADL evaluation and planning for discharge  - Provide patient education as appropriate  Outcome: Progressing  Goal: Patient will remain free of falls  Description: INTERVENTIONS:  - Educate patient/family on patient safety including physical limitations  - Instruct patient to call for assistance  with activity   - Consult OT/PT to assist with strengthening/mobility   - Keep Call bell within reach  - Keep bed low and locked with side rails adjusted as appropriate  - Keep care items and personal belongings within reach  - Initiate and maintain comfort rounds  - Apply yellow socks and bracelet for high fall risk patients  - Consider moving patient to room near nurses station  Outcome: Progressing     Problem: DISCHARGE PLANNING  Goal: Discharge to home or other facility with appropriate resources  Description: INTERVENTIONS:  - Identify barriers to discharge w/patient and caregiver  - Arrange for needed discharge resources and transportation as appropriate  - Identify discharge learning needs (meds, wound care, etc.)  - Arrange for interpretive services to assist at discharge as needed  - Refer to Case Management Department for coordinating discharge planning if the patient needs post-hospital services based on physician/advanced practitioner order or complex needs related to functional status, cognitive ability, or social support system  Outcome: Progressing     Problem: Knowledge Deficit  Goal: Patient/family/caregiver demonstrates understanding of disease process, treatment plan, medications, and discharge instructions  Description: Complete learning assessment and assess knowledge base.  Interventions:  - Provide teaching at level of understanding  - Provide teaching via preferred learning methods  Outcome: Progressing     Problem: CONFUSION/THOUGHT DISTURBANCE  Goal: Thought disturbances (confusion, delirium, depression, dementia or psychosis) are managed to maintain or return to baseline mental status and functional level  Description: INTERVENTIONS:  - Assess for possible contributors to  thought disturbance, including but not limited to medications, infection, impaired vision or hearing, underlying metabolic abnormalities, dehydration, respiratory compromise,  psychiatric diagnoses and notify attending  PHYSICAN/AP  - Monitor and intervene to maintain adequate nutrition, hydration, elimination, sleep and activity  - Decrease environmental stimuli, including noise as appropriate.  - Provide frequent contacts to provide refocusing, direction and reassurance as needed. Approach patient calmly with eye contact and at their level.  - [place] high risk fall precautions, aspiration precautions and other safety measures, as indicated  - If delirium suspected, notify physician/AP of change in condition and request immediate in-person evaluation  - Pursue consults as appropriate including Geriatric (campus dependent), OT for cognitive evaluation/activity planning, psychiatric, pastoral care, etc.  Outcome: Progressing

## 2024-07-11 NOTE — ASSESSMENT & PLAN NOTE
Resolved, back to baseline    Recent Labs     07/09/24  0807 07/10/24  0539   BUN 24 24   CREATININE 0.75 0.73   EGFR 88 91

## 2024-07-11 NOTE — ASSESSMENT & PLAN NOTE
Lab Results   Component Value Date    HGBA1C 6.4 (H) 05/03/2024     Recent Labs     07/10/24  1620 07/11/24  0002 07/11/24  0608 07/11/24  1136   POCGLU 88 83 87 91       Blood Sugar Average: Last 72 hrs:  (P) 85.75115522044917142    Continue Metformin

## 2024-07-11 NOTE — ED NOTES
Insurance Authorization for Admission:    Patient has Medicare A/B- No precert required at this time.    Wilner Wnag  Crisis Intervention Specialist II  07/11/24

## 2024-07-11 NOTE — CASE MANAGEMENT
Case Management Discharge Planning Note    Patient name Meli DORAN [facility]  Location [facility] 2 /[facility] 2 M* MRN [id number]  : 1965 Date 2024       Current Admission Date: 2024  Current Admission Diagnosis:Altered mental status   Patient Active Problem List    Diagnosis Date Noted Date Diagnosed    Altered mental status 2024     Acute hypernatremia 2024     Schizoaffective disorder, bipolar type (HCC) 2024     Medical clearance for psychiatric admission 2024     Type 2 diabetes mellitus (HCC) 2024     Obesity 2024     Psychosis (HCC) 2024     Tobacco abuse 2024     Hyperlipidemia 2024     Reactive airway disease 2024     NICHOLAS (acute kidney injury) ([facility]) 2024     SIRS (systemic inflammatory response syndrome) ([facility]) 2024     Abnormal urinalysis 2024     Rhabdomyolysis 2024     Abdominal distension 2024       LOS (days): 6  Geometric Mean LOS (GMLOS) (days): 3.1  Days to GMLOS:-2.7     OBJECTIVE:  Risk of Unplanned Readmission Score: 16.46         Current admission status: Inpatient   Preferred Pharmacy:   UNKNOWN - FOLLOW UP PRIOR TO DISCHARGE TO E-PRESCRIBE  No address on file      Primary Care Provider: Adriana Bradford MD    Primary Insurance: MEDICARE  Secondary Insurance:     DISCHARGE DETAILS:         Additional Comments: CM spoke with crisis: bed avaliable at [facility]. ([id number]) Crisis informed CM and bedside nurse transportation time confirmed for 1430. CM informed attending. CM provided medical necessity form in file. CM to follow.    Una Weems,

## 2024-07-11 NOTE — PLAN OF CARE
Problem: Prexisting or High Potential for Compromised Skin Integrity  Goal: Skin integrity is maintained or improved  Description: INTERVENTIONS:  - Identify patients at risk for skin breakdown  - Assess and monitor skin integrity  - Assess and monitor nutrition and hydration status  - Monitor labs   - Assess for incontinence   - Turn and reposition patient  - Assist with mobility/ambulation  - Relieve pressure over bony prominences  - Avoid friction and shearing  - Provide appropriate hygiene as needed including keeping skin clean and dry  - Evaluate need for skin moisturizer/barrier cream  - Collaborate with interdisciplinary team   - Patient/family teaching  - Consider wound care consult   Outcome: Progressing     Problem: MOBILITY - ADULT  Goal: Maintain or return to baseline ADL function  Description: INTERVENTIONS:  -  Assess patient's ability to carry out ADLs; assess patient's baseline for ADL function and identify physical deficits which impact ability to perform ADLs (bathing, care of mouth/teeth, toileting, grooming, dressing, etc.)  - Assess/evaluate cause of self-care deficits   - Assess range of motion  - Assess patient's mobility; develop plan if impaired  - Assess patient's need for assistive devices and provide as appropriate  - Encourage maximum independence but intervene and supervise when necessary  - Involve family in performance of ADLs  - Assess for home care needs following discharge   - Consider OT consult to assist with ADL evaluation and planning for discharge  - Provide patient education as appropriate  Outcome: Progressing  Goal: Maintains/Returns to pre admission functional level  Description: INTERVENTIONS:  - Perform AM-PAC 6 Click Basic Mobility/ Daily Activity assessment daily.  - Set and communicate daily mobility goal to care team and patient/family/caregiver.   - Collaborate with rehabilitation services on mobility goals if consulted  - Perform Range of Motion 3 times a day.  -  Reposition patient every 2 hours.  - Dangle patient 2 times a day  - Stand patient 3 times a day  - Ambulate patient 3 times a day  - Out of bed to chair 3 times a day   - Out of bed for meals 3 times a day  - Out of bed for toileting  - Record patient progress and toleration of activity level   Outcome: Progressing     Problem: PAIN - ADULT  Goal: Verbalizes/displays adequate comfort level or baseline comfort level  Description: Interventions:  - Encourage patient to monitor pain and request assistance  - Assess pain using appropriate pain scale  - Administer analgesics based on type and severity of pain and evaluate response  - Implement non-pharmacological measures as appropriate and evaluate response  - Consider cultural and social influences on pain and pain management  - Notify physician/advanced practitioner if interventions unsuccessful or patient reports new pain  Outcome: Progressing     Problem: INFECTION - ADULT  Goal: Absence or prevention of progression during hospitalization  Description: INTERVENTIONS:  - Assess and monitor for signs and symptoms of infection  - Monitor lab/diagnostic results  - Monitor all insertion sites, i.e. indwelling lines, tubes, and drains  - Monitor endotracheal if appropriate and nasal secretions for changes in amount and color  - [place] appropriate cooling/warming therapies per order  - Administer medications as ordered  - Instruct and encourage patient and family to use good hand hygiene technique  - Identify and instruct in appropriate isolation precautions for identified infection/condition  Outcome: Progressing     Problem: SAFETY ADULT  Goal: Maintain or return to baseline ADL function  Description: INTERVENTIONS:  -  Assess patient's ability to carry out ADLs; assess patient's baseline for ADL function and identify physical deficits which impact ability to perform ADLs (bathing, care of mouth/teeth, toileting, grooming, dressing, etc.)  - Assess/evaluate cause of  self-care deficits   - Assess range of motion  - Assess patient's mobility; develop plan if impaired  - Assess patient's need for assistive devices and provide as appropriate  - Encourage maximum independence but intervene and supervise when necessary  - Involve family in performance of ADLs  - Assess for home care needs following discharge   - Consider OT consult to assist with ADL evaluation and planning for discharge  - Provide patient education as appropriate  Outcome: Progressing  Goal: Maintains/Returns to pre admission functional level  Description: INTERVENTIONS:  - Perform AM-PAC 6 Click Basic Mobility/ Daily Activity assessment daily.  - Set and communicate daily mobility goal to care team and patient/family/caregiver.   - Collaborate with rehabilitation services on mobility goals if consulted  - Perform Range of Motion 3 times a day.  - Reposition patient every 2 hours.  - Dangle patient 2 times a day  - Stand patient 3 times a day  - Ambulate patient 3 times a day  - Out of bed to chair 3 times a day   - Out of bed for meals 3 times a day  - Out of bed for toileting  - Record patient progress and toleration of activity level   Outcome: Progressing  Goal: Patient will remain free of falls  Description: INTERVENTIONS:  - Educate patient/family on patient safety including physical limitations  - Instruct patient to call for assistance with activity   - Consult OT/PT to assist with strengthening/mobility   - Keep Call bell within reach  - Keep bed low and locked with side rails adjusted as appropriate  - Keep care items and personal belongings within reach  - Initiate and maintain comfort rounds  - Make Fall Risk Sign visible to staff  - Offer Toileting every 2 Hours, in advance of need  - Apply yellow socks and bracelet for high fall risk patients  - Consider moving patient to room near nurses station  Outcome: Progressing     Problem: DISCHARGE PLANNING  Goal: Discharge to home or other facility with  appropriate resources  Description: INTERVENTIONS:  - Identify barriers to discharge w/patient and caregiver  - Arrange for needed discharge resources and transportation as appropriate  - Identify discharge learning needs (meds, wound care, etc.)  - Arrange for interpretive services to assist at discharge as needed  - Refer to Case Management Department for coordinating discharge planning if the patient needs post-hospital services based on physician/advanced practitioner order or complex needs related to functional status, cognitive ability, or social support system  Outcome: Progressing     Problem: Knowledge Deficit  Goal: Patient/family/caregiver demonstrates understanding of disease process, treatment plan, medications, and discharge instructions  Description: Complete learning assessment and assess knowledge base.  Interventions:  - Provide teaching at level of understanding  - Provide teaching via preferred learning methods  Outcome: Progressing     Problem: Nutrition/Hydration-ADULT  Goal: Nutrient/Hydration intake appropriate for improving, restoring or maintaining nutritional needs  Description: Monitor and assess patient's nutrition/hydration status for malnutrition. Collaborate with interdisciplinary team and initiate plan and interventions as ordered.  Monitor patient's weight and dietary intake as ordered or per policy. Utilize nutrition screening tool and intervene as necessary. Determine patient's food preferences and provide high-protein, high-caloric foods as appropriate.     INTERVENTIONS:  - Monitor oral intake, urinary output, labs, and treatment plans  - Assess nutrition and hydration status and recommend course of action  - Evaluate amount of meals eaten  - Assist patient with eating if necessary   - Allow adequate time for meals  - Recommend/ encourage appropriate diets, oral nutritional supplements, and vitamin/mineral supplements  - Order, calculate, and assess calorie counts as needed  -  Recommend, monitor, and adjust tube feedings and TPN/PPN based on assessed needs  - Assess need for intravenous fluids  - Provide specific nutrition/hydration education as appropriate  - Include patient/family/caregiver in decisions related to nutrition  Outcome: Progressing     Problem: DECISION MAKING  Goal: Pt/Family able to effectively weigh alternatives and participate in decision making related to treatment and care  Description: INTERVENTIONS:  - Identify decision maker  - Determine when there are differences among patient's view, family's view, and healthcare provider's view of patient condition and care goals  - Facilitate patient/family articulation of goals for care  - Help patient/family identify pros/cons of alternative solutions  - Provide information as requested by patient/family  - Respect patient/family rights related to privacy and sharing information   - Serve as a liaison between patient, family and health care team  - Initiate consults as appropriate (Ethics Team, Palliative Care, Family Care Conference, etc.)  Outcome: Progressing     Problem: CONFUSION/THOUGHT DISTURBANCE  Goal: Thought disturbances (confusion, delirium, depression, dementia or psychosis) are managed to maintain or return to baseline mental status and functional level  Description: INTERVENTIONS:  - Assess for possible contributors to  thought disturbance, including but not limited to medications, infection, impaired vision or hearing, underlying metabolic abnormalities, dehydration, respiratory compromise,  psychiatric diagnoses and notify attending PHYSICAN/AP  - Monitor and intervene to maintain adequate nutrition, hydration, elimination, sleep and activity  - Decrease environmental stimuli, including noise as appropriate.  - Provide frequent contacts to provide refocusing, direction and reassurance as needed. Approach patient calmly with eye contact and at their level.  - [place] high risk fall precautions, aspiration  precautions and other safety measures, as indicated  - If delirium suspected, notify physician/AP of change in condition and request immediate in-person evaluation  - Pursue consults as appropriate including Geriatric (campus dependent), OT for cognitive evaluation/activity planning, psychiatric, pastoral care, etc.  Outcome: Progressing

## 2024-07-11 NOTE — PLAN OF CARE
Problem: Prexisting or High Potential for Compromised Skin Integrity  Goal: Skin integrity is maintained or improved  Description: INTERVENTIONS:  - Identify patients at risk for skin breakdown  - Assess and monitor skin integrity  - Assess and monitor nutrition and hydration status  - Monitor labs   - Assess for incontinence   - Turn and reposition patient  - Assist with mobility/ambulation  - Relieve pressure over bony prominences  - Avoid friction and shearing  - Provide appropriate hygiene as needed including keeping skin clean and dry  - Evaluate need for skin moisturizer/barrier cream  - Collaborate with interdisciplinary team   - Patient/family teaching  - Consider wound care consult   Outcome: Not Progressing     Problem: Alteration in Thoughts and Perception  Goal: Treatment Goal: Gain control of psychotic behaviors/thinking, reduce/eliminate presenting symptoms and demonstrate improved reality functioning upon discharge  Outcome: Not Progressing  Goal: Verbalize thoughts and feelings  Description: Interventions:  - Promote a nonjudgmental and trusting relationship with the patient through active listening and therapeutic communication  - Assess patient's level of functioning, behavior and potential for risk  - Engage patient in 1 on 1 interactions  - Encourage patient to express fears, feelings, frustrations, and discuss symptoms    - [place] patient to reality, help patient recognize reality-based thinking   - Administer medications as ordered and assess for potential side effects  - Provide the patient education related to the signs and symptoms of the illness and desired effects of prescribed medications  Outcome: Not Progressing  Goal: Refrain from acting on delusional thinking/internal stimuli  Description: Interventions:  - Monitor patient closely, per order   - Utilize least restrictive measures   - Set reasonable limits, give positive feedback for acceptable   - Administer medications as ordered  and monitor of potential side effects  Outcome: Not Progressing  Goal: Agree to be compliant with medication regime, as prescribed and report medication side effects  Description: Interventions:  - Offer appropriate PRN medication and supervise ingestion; conduct AIMS, as needed   Outcome: Not Progressing  Goal: Attend and participate in unit activities, including therapeutic, recreational, and educational groups  Description: Interventions:  -Encourage Visitation and family involvement in care  Outcome: Not Progressing  Goal: Recognize dysfunctional thoughts, communicate reality-based thoughts at the time of discharge  Description: Interventions:  - Provide medication and psycho-education to assist patient in compliance and developing insight into his/her illness   Outcome: Not Progressing  Goal: Complete daily ADLs, including personal hygiene independently, as able  Description: Interventions:  - Observe, teach, and assist patient with ADLS  - Monitor and promote a balance of rest/activity, with adequate nutrition and elimination   Outcome: Not Progressing     Problem: Risk for Self Injury/Neglect  Goal: Treatment Goal: Remain safe during length of stay, learn and adopt new coping skills, and be free of self-injurious ideation, impulses and acts at the time of discharge  Outcome: Not Progressing  Goal: Verbalize thoughts and feelings  Description: Interventions:  - Assess and re-assess patient's lethality and potential for self-injury  - Engage patient in 1:1 interactions, daily, for a minimum of 15 minutes  - Encourage patient to express feelings, fears, frustrations, hopes  - Establish rapport/trust with patient   Outcome: Not Progressing  Goal: Refrain from harming self  Description: Interventions:  - Monitor patient closely, per order  - Develop a trusting relationship  - Supervise medication ingestion, monitor effects and side effects   Outcome: Not Progressing  Goal: Attend and participate in unit  activities, including therapeutic, recreational, and educational groups  Description: Interventions:  - Provide therapeutic and educational activities daily, encourage attendance and participation, and document same in the medical record  - Obtain collateral information, encourage visitation and family involvement in care   Outcome: Not Progressing  Goal: Recognize maladaptive responses and adopt new coping mechanisms  Outcome: Not Progressing  Goal: Complete daily ADLs, including personal hygiene independently, as able  Description: Interventions:  - Observe, teach, and assist patient with ADLS  - Monitor and promote a balance of rest/activity, with adequate nutrition and elimination  Outcome: Not Progressing     Problem: Anxiety  Goal: Anxiety is at manageable level  Description: Interventions:  - Assess and monitor patient's anxiety level.   - Monitor for signs and symptoms (heart palpitations, chest pain, shortness of breath, headaches, nausea, feeling jumpy, restlessness, irritable, apprehensive).   - Collaborate with interdisciplinary team and initiate plan and interventions as ordered.  - [place] patient to unit/surroundings  - Explain treatment plan  - Encourage participation in care  - Encourage verbalization of concerns/fears  - Identify coping mechanisms  - Assist in developing anxiety-reducing skills  - Administer/offer alternative therapies  - Limit or eliminate stimulants  Outcome: Not Progressing

## 2024-07-12 PROBLEM — E87.6 HYPOKALEMIA: Status: ACTIVE | Noted: 2024-07-12

## 2024-07-12 PROBLEM — F06.1 CATATONIA: Status: ACTIVE | Noted: 2024-07-12

## 2024-07-12 LAB
25(OH)D3 SERPL-MCNC: 38.8 NG/ML (ref 30–100)
ALBUMIN SERPL BCG-MCNC: 3.7 G/DL (ref 3.5–5)
ALP SERPL-CCNC: 78 U/L (ref 34–104)
ALT SERPL W P-5'-P-CCNC: 16 U/L (ref 7–52)
ANION GAP SERPL CALCULATED.3IONS-SCNC: 13 MMOL/L (ref 4–13)
AST SERPL W P-5'-P-CCNC: 21 U/L (ref 13–39)
BASOPHILS # BLD AUTO: 0.07 THOUSANDS/ÂΜL (ref 0–0.1)
BASOPHILS NFR BLD AUTO: 1 % (ref 0–1)
BILIRUB SERPL-MCNC: 0.64 MG/DL (ref 0.2–1)
BUN SERPL-MCNC: 26 MG/DL (ref 5–25)
CALCIUM SERPL-MCNC: 9.2 MG/DL (ref 8.4–10.2)
CHLORIDE SERPL-SCNC: 109 MMOL/L (ref 96–108)
CK SERPL-CCNC: 109 U/L (ref 26–192)
CO2 SERPL-SCNC: 26 MMOL/L (ref 21–32)
CREAT SERPL-MCNC: 0.75 MG/DL (ref 0.6–1.3)
EOSINOPHIL # BLD AUTO: 0 THOUSAND/ÂΜL (ref 0–0.61)
EOSINOPHIL NFR BLD AUTO: 0 % (ref 0–6)
ERYTHROCYTE [DISTWIDTH] IN BLOOD BY AUTOMATED COUNT: 16.6 % (ref 11.6–15.1)
FOLATE SERPL-MCNC: >22.3 NG/ML
GFR SERPL CREATININE-BSD FRML MDRD: 88 ML/MIN/1.73SQ M
GLUCOSE P FAST SERPL-MCNC: 72 MG/DL (ref 65–99)
GLUCOSE SERPL-MCNC: 109 MG/DL (ref 65–140)
GLUCOSE SERPL-MCNC: 72 MG/DL (ref 65–140)
GLUCOSE SERPL-MCNC: 82 MG/DL (ref 65–140)
GLUCOSE SERPL-MCNC: 88 MG/DL (ref 65–140)
GLUCOSE SERPL-MCNC: 89 MG/DL (ref 65–140)
GLUCOSE SERPL-MCNC: 93 MG/DL (ref 65–140)
HCT VFR BLD AUTO: 41.3 % (ref 34.8–46.1)
HGB BLD-MCNC: 13 G/DL (ref 11.5–15.4)
IMM GRANULOCYTES # BLD AUTO: 0.04 THOUSAND/UL (ref 0–0.2)
IMM GRANULOCYTES NFR BLD AUTO: 0 % (ref 0–2)
LYMPHOCYTES # BLD AUTO: 2.16 THOUSANDS/ÂΜL (ref 0.6–4.47)
LYMPHOCYTES NFR BLD AUTO: 24 % (ref 14–44)
MAGNESIUM SERPL-MCNC: 2.2 MG/DL (ref 1.9–2.7)
MCH RBC QN AUTO: 28.9 PG (ref 26.8–34.3)
MCHC RBC AUTO-ENTMCNC: 31.5 G/DL (ref 31.4–37.4)
MCV RBC AUTO: 92 FL (ref 82–98)
MONOCYTES # BLD AUTO: 0.96 THOUSAND/ÂΜL (ref 0.17–1.22)
MONOCYTES NFR BLD AUTO: 11 % (ref 4–12)
NEUTROPHILS # BLD AUTO: 5.74 THOUSANDS/ÂΜL (ref 1.85–7.62)
NEUTS SEG NFR BLD AUTO: 64 % (ref 43–75)
NRBC BLD AUTO-RTO: 0 /100 WBCS
PHOSPHATE SERPL-MCNC: 3.7 MG/DL (ref 2.7–4.5)
PLATELET # BLD AUTO: 268 THOUSANDS/UL (ref 149–390)
PMV BLD AUTO: 10.6 FL (ref 8.9–12.7)
POTASSIUM SERPL-SCNC: 3.3 MMOL/L (ref 3.5–5.3)
PROT SERPL-MCNC: 6.4 G/DL (ref 6.4–8.4)
RBC # BLD AUTO: 4.5 MILLION/UL (ref 3.81–5.12)
SODIUM SERPL-SCNC: 148 MMOL/L (ref 135–147)
TSH SERPL DL<=0.05 MIU/L-ACNC: 1.54 UIU/ML (ref 0.45–4.5)
VIT B12 SERPL-MCNC: 827 PG/ML (ref 180–914)
WBC # BLD AUTO: 8.97 THOUSAND/UL (ref 4.31–10.16)

## 2024-07-12 PROCEDURE — 82306 VITAMIN D 25 HYDROXY: CPT

## 2024-07-12 PROCEDURE — 83735 ASSAY OF MAGNESIUM: CPT

## 2024-07-12 PROCEDURE — 82607 VITAMIN B-12: CPT

## 2024-07-12 PROCEDURE — 99222 1ST HOSP IP/OBS MODERATE 55: CPT | Performed by: NURSE PRACTITIONER

## 2024-07-12 PROCEDURE — 99223 1ST HOSP IP/OBS HIGH 75: CPT | Performed by: STUDENT IN AN ORGANIZED HEALTH CARE EDUCATION/TRAINING PROGRAM

## 2024-07-12 PROCEDURE — 82550 ASSAY OF CK (CPK): CPT | Performed by: STUDENT IN AN ORGANIZED HEALTH CARE EDUCATION/TRAINING PROGRAM

## 2024-07-12 PROCEDURE — 85025 COMPLETE CBC W/AUTO DIFF WBC: CPT

## 2024-07-12 PROCEDURE — 82948 REAGENT STRIP/BLOOD GLUCOSE: CPT

## 2024-07-12 PROCEDURE — 82746 ASSAY OF FOLIC ACID SERUM: CPT

## 2024-07-12 PROCEDURE — 84100 ASSAY OF PHOSPHORUS: CPT

## 2024-07-12 PROCEDURE — 93005 ELECTROCARDIOGRAM TRACING: CPT

## 2024-07-12 PROCEDURE — 84443 ASSAY THYROID STIM HORMONE: CPT

## 2024-07-12 PROCEDURE — 80053 COMPREHEN METABOLIC PANEL: CPT

## 2024-07-12 RX ORDER — ALBUTEROL SULFATE 90 UG/1
2 AEROSOL, METERED RESPIRATORY (INHALATION) EVERY 6 HOURS PRN
Status: ON HOLD | COMMUNITY
Start: 2024-04-25

## 2024-07-12 RX ORDER — INSULIN LISPRO 100 [IU]/ML
1-5 INJECTION, SOLUTION INTRAVENOUS; SUBCUTANEOUS
Status: DISCONTINUED | OUTPATIENT
Start: 2024-07-12 | End: 2024-07-13 | Stop reason: HOSPADM

## 2024-07-12 RX ORDER — FAMOTIDINE 20 MG/1
20 TABLET, FILM COATED ORAL 2 TIMES DAILY
Status: ON HOLD | COMMUNITY
Start: 2024-02-28

## 2024-07-12 RX ORDER — BUSPIRONE HYDROCHLORIDE 10 MG/1
10 TABLET ORAL 2 TIMES DAILY
Status: ON HOLD | COMMUNITY
Start: 2024-06-03

## 2024-07-12 RX ORDER — LORAZEPAM 1 MG/1
1 TABLET ORAL 4 TIMES DAILY
Status: DISCONTINUED | OUTPATIENT
Start: 2024-07-12 | End: 2024-07-13 | Stop reason: HOSPADM

## 2024-07-12 RX ORDER — LORAZEPAM 1 MG/1
1 TABLET ORAL 4 TIMES DAILY
Status: DISCONTINUED | OUTPATIENT
Start: 2024-07-12 | End: 2024-07-12

## 2024-07-12 RX ORDER — POTASSIUM CHLORIDE 20MEQ/15ML
40 LIQUID (ML) ORAL ONCE
Status: COMPLETED | OUTPATIENT
Start: 2024-07-12 | End: 2024-07-12

## 2024-07-12 RX ORDER — AMLODIPINE BESYLATE 5 MG/1
5 TABLET ORAL DAILY
Status: DISCONTINUED | OUTPATIENT
Start: 2024-07-12 | End: 2024-07-13 | Stop reason: HOSPADM

## 2024-07-12 RX ORDER — ATORVASTATIN CALCIUM 10 MG/1
10 TABLET, FILM COATED ORAL DAILY
Status: ON HOLD | COMMUNITY
Start: 2024-02-28

## 2024-07-12 RX ORDER — AMMONIUM LACTATE 12 G/100G
LOTION TOPICAL
Status: ON HOLD | COMMUNITY
Start: 2024-03-29 | End: 2025-03-29

## 2024-07-12 RX ORDER — HYDRALAZINE HYDROCHLORIDE 20 MG/ML
5 INJECTION INTRAMUSCULAR; INTRAVENOUS EVERY 6 HOURS PRN
Status: DISCONTINUED | OUTPATIENT
Start: 2024-07-12 | End: 2024-07-13

## 2024-07-12 RX ORDER — FLUOXETINE 20 MG/5ML
20 SOLUTION ORAL DAILY
Status: DISCONTINUED | OUTPATIENT
Start: 2024-07-12 | End: 2024-07-13 | Stop reason: HOSPADM

## 2024-07-12 RX ORDER — CLOZAPINE 100 MG/1
TABLET ORAL
Status: ON HOLD | COMMUNITY
Start: 2024-06-03

## 2024-07-12 RX ORDER — FLUTICASONE FUROATE 100 UG/1
100 POWDER RESPIRATORY (INHALATION) DAILY
Status: ON HOLD | COMMUNITY
Start: 2024-02-12

## 2024-07-12 RX ORDER — CALCIUM CARB/VITAMIN D3/VIT K1 500-500-40
15 TABLET,CHEWABLE ORAL DAILY
Status: DISCONTINUED | OUTPATIENT
Start: 2024-07-12 | End: 2024-07-13 | Stop reason: HOSPADM

## 2024-07-12 RX ORDER — LORAZEPAM 2 MG/ML
1 INJECTION INTRAMUSCULAR 4 TIMES DAILY
Status: DISCONTINUED | OUTPATIENT
Start: 2024-07-12 | End: 2024-07-13 | Stop reason: HOSPADM

## 2024-07-12 RX ORDER — ALPRAZOLAM 1 MG/1
TABLET, EXTENDED RELEASE ORAL
Status: ON HOLD | COMMUNITY
Start: 2024-06-28

## 2024-07-12 RX ORDER — HYDRALAZINE HYDROCHLORIDE 20 MG/ML
5 INJECTION INTRAMUSCULAR; INTRAVENOUS ONCE
Status: DISCONTINUED | OUTPATIENT
Start: 2024-07-12 | End: 2024-07-12

## 2024-07-12 RX ORDER — CLOZAPINE 200 MG/1
200 TABLET ORAL 2 TIMES DAILY
Status: ON HOLD | COMMUNITY
Start: 2024-06-03

## 2024-07-12 RX ORDER — ASPIRIN 81 MG/1
81 TABLET ORAL
Status: ON HOLD | COMMUNITY

## 2024-07-12 RX ADMIN — LORAZEPAM 1 MG: 2 INJECTION INTRAMUSCULAR; INTRAVENOUS at 21:51

## 2024-07-12 RX ADMIN — AMLODIPINE BESYLATE 5 MG: 5 TABLET ORAL at 12:14

## 2024-07-12 RX ADMIN — DEXTROSE 1000 ML: 5 SOLUTION INTRAVENOUS at 11:36

## 2024-07-12 RX ADMIN — POTASSIUM CHLORIDE 40 MEQ: 1.5 SOLUTION ORAL at 11:56

## 2024-07-12 RX ADMIN — LORAZEPAM 1 MG: 1 TABLET ORAL at 12:15

## 2024-07-12 RX ADMIN — MELATONIN TAB 3 MG 3 MG: 3 TAB at 21:27

## 2024-07-12 RX ADMIN — LORAZEPAM 1 MG: 1 TABLET ORAL at 17:34

## 2024-07-12 NOTE — PROGRESS NOTES
07/12/24 0726   Team Meeting   Meeting Type Daily Rounds   Initial Conference Date 07/12/24   Next Conference Date 07/15/24   Team Members Present   Team Members Present Physician;Nurse;;   Physician Team Member Dr Banuelos, Dr Valdez, Marie MOCTEZUMA, Rehana OGDEN   Nursing Team Member Dona   Care Management Team Member Anum   Social Work Team Member Ayse   Patient/Family Present   Patient Present No   Patient's Family Present No     Non verbal, poor physical condition, BP high and will talk to SLIM, refusing all meds, CK elevated, PO intake she took OJ and yogurt in a syringe, look of fear on her face, zyprexa 5 mg IM, accepting of IM but nothing by mouth, discharge pending.

## 2024-07-12 NOTE — PROGRESS NOTES
07/12/24 1313   Team Meeting   Meeting Type Tx Team Meeting   Initial Conference Date 07/12/24   Team Members Present   Team Members Present Physician;Nurse;   Physician Team Member Dr. Banuelos   Nursing Team Member Kayla   Care Management Team Member Mercedes   Patient/Family Present   Patient Present Yes   Patient's Family Present No     Treatment plan reviewed with the patient; patient unable to express understanding and unable to sign at this time, not speaking a word to CM. A copy of the patient's treatment plan was put into her discharge folder and the original was put in for scanning.

## 2024-07-12 NOTE — QUICK NOTE
SECOND OPINION FOR INJECTABLE PSYCHIATRIC MEDICATIONS    Meli Nowak 59 y.o. female MRN: [id number]  Unit/Bed#: [facility] 655-02 Encounter: [id number]        60 yo female with history of schizoaffective disorder bipolar type. Pt originally on 6B but was transferred to 7T due to altered mental status, NICHOLAS and possible catatonia versus NMS. Pt now only on ativan for catatonia/NMS at this time. Pt is disheveled in appearance, currently nonverbal with writer, although she does make eye contact perks up if she hears her name. Pt as been refusing all medications, refusing oral intake. Pt appears to be internally preoccupied, scared affect with possible AH/VH and she per chart has admitted to hearing things and seeing things.     I agree with   about the second opinion for pt to be IV/IM medication over objection of oral medications. Pt is needs to have consistent medication compliance to stabilize as pt is currently a danger to herself and unable to care for herself in her current state             Landry Liang, DO 07/12/24

## 2024-07-12 NOTE — NURSING NOTE
Received patient in bed, disheveled with minimal verbal communication. Patient not taking PO but took 4 syringes full of juice and ate a 1/2 of yogurt when fed to her. Patient given a complete bed bath. Patient incontinent of urine. Patient needs frequent reassurance of her safety. Patient appears to be having both visual and auditory hallucinations, she gets a fearful look on her face and holds her head. Later this pm patient expressed she is seeing and hearing things. Patient refused Melatonin at HS. Continual reorientation to environment and education on medications; patient needs more education as she does not seem to always understand. Will continue to monitor and support during treatment.

## 2024-07-12 NOTE — NURSING NOTE
Patient seems to have had + effect from medication as she appears to be sleeping at this time. Will continue to monitor

## 2024-07-12 NOTE — TREATMENT PLAN
TREATMENT PLAN REVIEW - Behavioral Health Meli Nowak 59 y.o. 1965 female MRN: [id number]    [facility] 6B [facility] Room / Bed: [facility] 655/[facility] 655-02 Encounter: [id number]          Admit Date/Time:  7/11/2024  3:51 PM    Treatment Team:   MD Claire Carter RN Gillian Frey, RN Olivia Steward Karissa Marie Kormandy, CRNP Jacqueline Almonte Melissa Hawkey, RN    Diagnosis: Principal Problem:    Schizoaffective disorder, bipolar type (HCC)  Active Problems:    Medical clearance for psychiatric admission    Type 2 diabetes mellitus (HCC)    Obesity    Hypernatremia    Posttraumatic stress disorder    Catatonia    Hypokalemia      Patient Strengths/Assets: good past treatment response    Patient Barriers/Limitations: difficulty adapting, impaired cognition, noncompliant with medication, patient is on an involuntary commitment, poor self-care, resistance to treatment, uncooperative    Short Term Goals: decrease in paranoid thoughts, decrease in psychotic symptoms, decrease in suicidal thoughts, ability to stay safe on the unit, ability to stay free of restraints, improvement in ability to express basic needs, improvement in insight, improvement in self care, improvement in appetite, mood stabilization, increase in group attendance, increase in socialization with peers on the unit    Long Term Goals: stabilization of mood, free of suicidal thoughts, free of homicidal thoughts, no self abusive behavior, resolution of psychotic symptoms, improvement in reality testing, improved insight, able to express basic needs, acceptance of need for psychiatric medications, acceptance of need for psychiatric follow up after discharge, adequate self care, adequate appetite, adequate oral intake, appropriate interaction with peers, stable living arrangements upon discharge    Progress Towards Goals: starting psychiatric medications as  prescribed    Recommended Treatment: medication management, patient medication education, group therapy, milieu therapy, continued Behavioral Health psychiatric evaluation/assessment process    Treatment Frequency: daily medication monitoring, group and milieu therapy daily, monitoring through interdisciplinary rounds, monitoring through weekly patient care conferences    Expected Discharge Date:  21 days    Discharge Plan: referral for outpatient medication management with a psychiatrist, referral for outpatient psychotherapy, referrals as indicated, return to previous living arrangement    Treatment Plan Created/Updated By: Dereje Banuelos MD

## 2024-07-12 NOTE — NURSING NOTE
"Patient is visible in the dayroom, took meds with encouragement. Patient refuses to eat, or drink. Patient was holding head and screaming\" I don't do drugs\", reassurance offers. Patient is calm at the moment,  showered today, remains on 1:1 for safety.    "

## 2024-07-12 NOTE — CASE MANAGEMENT
"                                                                                                               Case Management Assessment      Psychosocial Assessment 1:1:   CM met with the patient privately to introduce self, role of CM, and to gather background information. The patient would not speak with CM, appeared non verbal, with an anxious affect. The patient would not answer any questions. She said \"why am I here?\" CM attempted to have the patient signed ROIs for her family/ICM but the patient declined. Per chart review, it appears the patient lives alone in her apartment and at baseline does not need assistance with ADLs. The patient does have an ICM.      Admission / Details:   The patient is currently admitted on a 303 status from [facility] due to catatonia.     Residence:   Lives with: Self  County:   [facility]  Commitment Status: 303  Insurance: Medicare A&B  Rx coverage: Unknown  Pharmacy:  Unknown  Marital Status: Unknown  Children: Unknown  Support System: ICM  Level of Ed: Unknown  Work History: Unknown  Income/Source: SSDI  Mormon: Unknown  Transportation: Unknown  Legal Issues: Unknown  Tobacco: Unknown   Hx: Unknown  Access to firearms: Unknown  MH Treatment Hx: Patient has no history of IP  admission; patient was active with [facility] in 2014  Past Suicide Attempt: Unknown  Current SI/HI: Unknown  Trauma Hx: Unknown  Family Hx: Unknown  D&A Hx: Unknown  UDS Results: Negative  Medical: Please see chart  DME: None  PCP: Adriana Bradford MD   Psych: Needs referral  Therapist: None  ICM/ACT:  Con[facility]ita [facility]  Stressors: Unknown  Strengths: Unknown  Coping Skills: Unknown  ROIs Signed: No ROIs signed at this time  Treatment Plan Signed: No, patient declined  IMM Signed: No, patient declined  Disposition Plan: TBD      Additional/Collateral Information:   No ROIs signed at this time.   "

## 2024-07-12 NOTE — PROGRESS NOTES
07/12/24 1314   Referral Data   Referral Source Other (Comment)  ([facility])   Referral Reason Psych   County Information   County of Residence [facility]   Readmission Root Cause   30 Day Readmission No   Patient Information   Mental Status Confused   Primary Caregiver Self   Support System Immediate family;Other (Comment)  (ICM)   [place]/Cultural Requests Unknown   Legal Information   Tx Plan Signed No (Comment)   Current Status: 303   Legal Issues Unknown   Activities of Daily Living Prior to Admission   Functional Status Independent   Assistive Device No device needed   Living Arrangement Apartment;Lives alone   Ambulation Minimum assistance   Access to Firearms   Access to Firearms No   Income Information   Income Source SSI/SSD   Means of Transportation   Means of Transport to Appts: [place]

## 2024-07-12 NOTE — ASSESSMENT & PLAN NOTE
Lab Results   Component Value Date    HGBA1C 6.4 (H) 05/03/2024       Recent Labs     07/11/24  1842 07/11/24  2129 07/12/24  0510 07/12/24  0803   POCGLU 81 84 82 89       Blood Sugar Average: Last 72 hrs:  (P) 83.2  Very minimal PO intake  Hold Metformin  Monitor ACCU checks AC + HS with lispro insulin sliding scale

## 2024-07-12 NOTE — CONSULTS
[facility]  Consult  Name: Meli Nowak 59 y.o. female I MRN: [id number]  Unit/Bed#: [facility] 655-02 I Date of Admission: 7/11/2024   Date of Service: 7/12/2024 I Hospital Day: 1    Inpatient consult for Medical Clearance for  patient  Consult performed by: JOSE ELIAS Ortega  Consult ordered by: JOSE ELIAS Figueroa        Assessment & Plan   Medical clearance for psychiatric admission  Assessment & Plan  Admission labs: CBC, CMP, lipid panel, TSH acceptable  Na 148  K 3.3   Folate, B12, Vitamin D 25 hydroxy, CK labs pending  Vitals stable   EKG reveals NSR, 90 bpm   Patient is medically cleared for admission to [facility] and treatment of underlying psychiatric illness based on available results  Please contact SLIM with any questions or concerns    Hypokalemia  Assessment & Plan  K+ 3.3  Replete with KCL 40 meq liquid solution  BMP in am     Catatonia  Assessment & Plan  Not eating or drinking   Non verbal   Following minimal commands  Will give D5W 1 liter bolus x1   Will give KCL 40 mEq oral solution   Monitor oral intake closely  Repeat BMP in am     Hypernatremia  Assessment & Plan  Na 148 likely from dehydration   Will give D5W 1 liter bolus now   BMP in am     Type 2 diabetes mellitus (HCC)  Assessment & Plan  Lab Results   Component Value Date    HGBA1C 6.4 (H) 05/03/2024       Recent Labs     07/11/24  1842 07/11/24  2129 07/12/24  0510 07/12/24  0803   POCGLU 81 84 82 89       Blood Sugar Average: Last 72 hrs:  (P) 83.2  Very minimal PO intake  Hold Metformin  Monitor ACCU checks AC + HS with lispro insulin sliding scale    Posttraumatic stress disorder  Assessment & Plan  Admitted to [facility]  Management per primary service     Obesity  Assessment & Plan  Would benefit from weight loss and therapeutic lifestyle changes  Education and counseling as tolerated   Consider nutrition evaluation       * Schizoaffective disorder, bipolar type (HCC)  Assessment &  Plan  Admitted to [facility]  Management per primary service          ECT Clearance:  History of recent seizure or stroke:  no  History of pheochromocytoma:  no  History of active bleeding (Intracranial hemorrhage, aneurysm or AVM):  no  History of metallic implants in the head or neck:  no  History of increased intracranial pressure with mass effect:  no  History of cervical spine fracture or instability:  no  Has the patient had an EKG in the past 3 months?  Results: NSR, 90 bpm  What is the QTc interval? 447    Based on above criteria, Patient is medically cleared for ECT should it be recommended.    Recommendations for Discharge:  SLIM will sign off - please call with questions or concerns.  Follow up with PCP upon discharge.     Counseling / Coordination of Care Time: 30 minutes.  Greater than 50% of total time spent on patient counseling and coordination of care.    Collaboration of Care: Were Recommendations Directly Discussed with Primary Treatment Team? - Yes     History of Present Illness:    Meli Nowak is a 59 y.o. female with a PMH including schizoaffective disorder, bipolar type, T2DM, obesity, and reactive airway disease who is originally admitted to the psychiatric service due to catatonia. We are consulted for medical clearance for psychiatric hospitalization and medical management. [facility] 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 She was transferred to  medical unit for further evaluation and treatment. She was maintain on Ativan for catatonia. Neurology was consulted and recommended LP and MRI brain for which patient was sent to [facility]. Workup thus far has been negative. Lyme and autoimmune are pending. Patient was medically stabilized and transferred back to [facility]. Currently, she is resting out of bed in a recliner chair on room air. She is non verbal. She  follows minimal commands (track finger).      Review of Systems:    Review of Systems   Constitutional:  Negative for appetite change and chills.   HENT:  Negative for congestion and sore throat.    Eyes:  Negative for visual disturbance.   Respiratory:  Negative for cough and shortness of breath.    Cardiovascular:  Negative for chest pain.   Gastrointestinal:  Negative for abdominal pain, constipation, diarrhea, nausea and vomiting.   Genitourinary:  Negative for difficulty urinating.   Musculoskeletal:  Negative for gait problem.   Skin:  Negative for wound.   Neurological:  Negative for dizziness, light-headedness and headaches.       Past Medical and Surgical History:     Past Medical History:   Diagnosis Date    Cognitive impairment     Diabetes mellitus (HCC)     Psychosis (HCC)        Past Surgical History:   Procedure Laterality Date     SECTION      CHOLECYSTECTOMY         Meds/Allergies:    all medications and allergies reviewed    Allergies: No Known Allergies    Social History:     Marital Status: Single    Substance Use History:   Social History     Substance and Sexual Activity   Alcohol Use Never    Comment: Unable to determine     Social History     Tobacco Use   Smoking Status Every Day    Current packs/day: 0.50    Types: Cigarettes   Smokeless Tobacco Never     Social History     Substance and Sexual Activity   Drug Use No       Family History:    History reviewed. No pertinent family history.    Physical Exam:     Vitals:   Blood Pressure: 170/69 (24 0736)  Pulse: 82 (24 07)  Temperature: 98.5 °F (36.9 °C) (24)  Temp Source: Temporal (24)  Respirations: 17 (24)  SpO2: 95 % (24)    Physical Exam  Vitals and nursing note reviewed.   Constitutional:       General: She is not in acute distress.     Appearance: She is obese. She is ill-appearing. She is not diaphoretic.   HENT:      Head: Normocephalic.      Mouth/Throat:      Mouth:  Mucous membranes are dry.   Eyes:      Conjunctiva/sclera:      Left eye: Left conjunctiva is injected.   Cardiovascular:      Rate and Rhythm: Normal rate.      Heart sounds: Murmur heard.   Pulmonary:      Effort: Pulmonary effort is normal.      Breath sounds: Normal breath sounds.   Abdominal:      General: Bowel sounds are normal. There is no distension.      Palpations: Abdomen is soft.      Tenderness: There is no abdominal tenderness.   Musculoskeletal:         General: Normal range of motion.      Cervical back: Normal range of motion.      Right lower leg: No edema.      Left lower leg: No edema.   Skin:     General: Skin is warm and dry.      Capillary Refill: Capillary refill takes less than 2 seconds.   Neurological:      Mental Status: She is alert. She is disoriented.   Psychiatric:         Mood and Affect: Affect is inappropriate.         Speech: Speech is delayed.         Behavior: Behavior is withdrawn.         Cognition and Memory: Cognition is impaired. Memory is impaired.         Judgment: Judgment is inappropriate.         Additional Data:     Lab Results:     Results from last 7 days   Lab Units 07/12/24  0513   WBC Thousand/uL 8.97   HEMOGLOBIN g/dL 13.0   HEMATOCRIT % 41.3   PLATELETS Thousands/uL 268   SEGS PCT % 64   LYMPHO PCT % 24   MONO PCT % 11   EOS PCT % 0     Results from last 7 days   Lab Units 07/12/24  0513   SODIUM mmol/L 148*   POTASSIUM mmol/L 3.3*   CHLORIDE mmol/L 109*   CO2 mmol/L 26   BUN mg/dL 26*   CREATININE mg/dL 0.75   ANION GAP mmol/L 13   CALCIUM mg/dL 9.2   ALBUMIN g/dL 3.7   TOTAL BILIRUBIN mg/dL 0.64   ALK PHOS U/L 78   ALT U/L 16   AST U/L 21   GLUCOSE RANDOM mg/dL 72     Results from last 7 days   Lab Units 07/05/24  1234   INR  0.90         Lab Results   Component Value Date/Time    HGBA1C 6.4 (H) 05/03/2024 02:07 PM    HGBA1C 6.3 (H) 11/16/2023 02:37 PM    HGBA1C 6.8 (H) 10/06/2023 03:01 PM     Results from last 7 days   Lab Units 07/12/24  0803  07/12/24  0510 07/11/24  2129 07/11/24  1842 07/11/24  1558 07/11/24  1136 07/11/24  0608 07/11/24  0002 07/10/24  1620 07/10/24  1138 07/10/24  0514 07/09/24  2326   POC GLUCOSE mg/dl 89 82 84 81 80 91 87 83 88 87 76 77           Imaging: I have personally reviewed pertinent reports.      No orders to display       EKG, Pathology, and Other Studies Reviewed on Admission:   EKG: see above documentation    ** Please Note: This note has been constructed using a voice recognition system. **

## 2024-07-12 NOTE — ASSESSMENT & PLAN NOTE
Admission labs: CBC, CMP, lipid panel, TSH acceptable  Na 148  K 3.3   Folate, B12, Vitamin D 25 hydroxy, CK labs pending  Vitals stable   EKG reveals NSR, 90 bpm   Patient is medically cleared for admission to [facility] and treatment of underlying psychiatric illness based on available results  Please contact SLIM with any questions or concerns

## 2024-07-12 NOTE — TREATMENT TEAM
07/11/24 5887   Broset Violence Checklist   Assessment type Shift   Irritability 1   Confusion 1   Boisterousness 1   Threatening physical violence 0   Verbal threats 0   Violence 0   Broset score 3   Agitated Behavior Scale   Short Attention Span, Easy Distractibility, Inability to Concentrate 4   Impulsive, Impatient, Low Tolerance for Pain or Frustration 2   Uncooperative, Resistant to Care, Demanding 2   Violent and/or Threatening Violence Toward People or Property 1   Explosive and/or Unpredictable Anger 1   Rocking, Rubbing, Moaning, Other Self-Stimulating Behavior 4   Pulling at Tubes, Restraints, etc. 1   Wandering from Treatment Area 1   Restlessness, Pacing, Excessive Movement 3   Repetitive Behaviors, Motor and/or Verbal 1   Rapid, Loud or Excessive Talking 1   Sudden Changes of Mood 1   Easily Initiated - Excessive Crying and/or Laughter 2   Self-Abusiveness, Physical and/or Verbal 1   Agitated Behavior Scale Total Score  25     Patient restless and agitated. Patient c/o visual or auditory hallucinations. Patient fearful and paranoid. Medicated as per prns with IM at patient's request.   Wheelchair/Stroller

## 2024-07-12 NOTE — PLAN OF CARE
Problem: Prexisting or High Potential for Compromised Skin Integrity  Goal: Skin integrity is maintained or improved  Description: INTERVENTIONS:  - Identify patients at risk for skin breakdown  - Assess and monitor skin integrity  - Assess and monitor nutrition and hydration status  - Monitor labs   - Assess for incontinence   - Turn and reposition patient  - Assist with mobility/ambulation  - Relieve pressure over bony prominences  - Avoid friction and shearing  - Provide appropriate hygiene as needed including keeping skin clean and dry  - Evaluate need for skin moisturizer/barrier cream  - Collaborate with interdisciplinary team   - Patient/family teaching  - Consider wound care consult   Outcome: Progressing     Problem: Alteration in Thoughts and Perception  Goal: Complete daily ADLs, including personal hygiene independently, as able  Description: Interventions:  - Observe, teach, and assist patient with ADLS  - Monitor and promote a balance of rest/activity, with adequate nutrition and elimination   Outcome: Not Progressing     Problem: Risk for Self Injury/Neglect  Goal: Refrain from harming self  Description: Interventions:  - Monitor patient closely, per order  - Develop a trusting relationship  - Supervise medication ingestion, monitor effects and side effects   Outcome: Progressing     Problem: Anxiety  Goal: Anxiety is at manageable level  Description: Interventions:  - Assess and monitor patient's anxiety level.   - Monitor for signs and symptoms (heart palpitations, chest pain, shortness of breath, headaches, nausea, feeling jumpy, restlessness, irritable, apprehensive).   - Collaborate with interdisciplinary team and initiate plan and interventions as ordered.  - [place] patient to unit/surroundings  - Explain treatment plan  - Encourage participation in care  - Encourage verbalization of concerns/fears  - Identify coping mechanisms  - Assist in developing anxiety-reducing skills  - Administer/offer  alternative therapies  - Limit or eliminate stimulants  Outcome: Not Progressing

## 2024-07-12 NOTE — H&P
Psychiatric Evaluation - Behavioral Health     Identification Data:Meli Nowak 59 y.o. female MRN: [id number]  Unit/Bed#: [facility] 655-02 Encounter: [id number]    Chief Complaint:     History of Present Illness     Meli Nowak is a 59 y.o.  female, w/ PMH of DM, rhabdomyolysis, HSV-2 infection, GERD, endometrial cancer, arthritis, urinary incontinence, and PPH of Schizoaffective disorder, PTSD, JOSEPH, panic disorder w/ agoraphobia, previously on Clozaril 200/300  mg, Xanax XR 1 mg and Paxil 30 mg BID and BuSpar as per chart review who initially presented to the ED on 6/29/24 due to paranoia and then on 7/1/24 was BIB [facility] to the ED due to paranoia and disorganized behavior. The patient initially admitted to the inpatient psychiatry unit  on 303 status but transferred to medical unit the same day due to AMS in presence of NICHOLAS w/ concern for catatonia vs. NMS. The patient underwent LP to r/o encephalitis and further w/u including Brain MRI were unremarkable for acute changes, started on Ativan which uptitrated to 1 mg IV q4h, and then transferred back to 6B for further psychiatric stabilization.     The patient was visited on the unit; chart reviewed. Presented calm but guarded and uncooperative with evaluation, with staring gaze at times, suckling her finger, and reportedly has not been eating / drinking or taking po meds. JAMES visited the patient on the unit for dehydration, hypokalemia, hypernatremia and hypertension (BP: 170/69 this morning) and the patient received IV bolus and will closely monitor the patient w/ daily weight and I/O check. Given the non-compliance with medications and treatment, the patient was also visited by Dr. Liang for the second opinion and will require MOO. The patient was placed on 1:1 for safety and fall risk. Started on Ativan 1 mg po/IV q4h for catatonia; doses to be adjusted as indicated, and also started on Prozac Liquid 20 mg daily. May benefit from restarting Clozaril  once the catatonia and medical conditions improves vs. Court ordered ECT, if indicated.     Psychiatric Review Of Systems:  Pertinent items are noted in HPI; all others negative      Historical Information     Past Psychiatric History:   Past Inpatient Psychiatric Treatment:   Unclear history of past psychiatric admissions  Past Outpatient Psychiatric Treatment:    Unclear; not cooperative  Past Suicide Attempts:  no cooperative; unclear  Past Violent Behavior: unclear  Past Psychiatric Medication Trials:  patient is not cooperative; on Clozaril 200/300  mg, Xanax XR 1 mg and Paxil 30 mg BID and BuSpar as per chart review     Substance Abuse History:  H/o smoking cigarettes as per chart review.  Social History     Substance and Sexual Activity   Alcohol Use Never    Comment: Unable to determine     Social History     Substance and Sexual Activity   Drug Use No         Family Psychiatric History:   History reviewed. No pertinent family history.    Social History:  Social History     Socioeconomic History    Marital status: Single     Spouse name: Not on file    Number of children: Not on file    Years of education: Not on file    Highest education level: Not on file   Occupational History    Not on file   Tobacco Use    Smoking status: Every Day     Current packs/day: 0.50     Types: Cigarettes    Smokeless tobacco: Never   Vaping Use    Vaping status: Never Used   Substance and Sexual Activity    Alcohol use: Never     Comment: Unable to determine    Drug use: No    Sexual activity: Not on file   Other Topics Concern    Not on file   Social History Narrative    Not on file     Social Determinants of Health     Financial Resource Strain: Patient Unable To Answer (7/3/2024)    Overall Financial Resource Strain (CARDIA)     Difficulty of Paying Living Expenses: Patient unable to answer   Food Insecurity: No Food Insecurity (7/9/2024)    Hunger Vital Sign     Worried About Running Out of Food in the Last Year: Never  true     Ran Out of Food in the Last Year: Never true   Transportation Needs: No Transportation Needs (7/9/2024)    PRAPARE - Transportation     Lack of Transportation (Medical): No     Lack of Transportation (Non-Medical): No   Physical Activity: Not on file   Stress: Not on file   Social Connections: Not on file   Intimate Partner Violence: Patient Unable To Answer (7/3/2024)    Humiliation, Afraid, Rape, and Kick questionnaire     Fear of Current or Ex-Partner: Patient unable to answer     Emotionally Abused: Patient unable to answer     Physically Abused: Patient unable to answer     Sexually Abused: Patient unable to answer   Housing Stability: Low Risk  (7/9/2024)    Housing Stability Vital Sign     Unable to Pay for Housing in the Last Year: No     Number of Times Moved in the Last Year: 0     Homeless in the Last Year: No       Developmental:  Education:  unknown; not cooperative  Marital history: unknown; not cooperative  Children: unknown; not cooperative  Living arrangement, social support: unknown; not cooperative  Occupational History: unknown occupation  Access to firearms: unknown; not cooperative    Traumatic History:   Abuse:unknown; not cooperative; h/o PTSD as per chart review  Other Traumatic Events:  unknown; not cooperative    Past Medical History:   Diagnosis Date    Cognitive impairment     Diabetes mellitus (HCC)     Psychosis (HCC)        Medical Review Of Systems:  Pertinent items are noted in HPI; all others negative    Meds/Allergies   all current active meds have been reviewed, current meds:   Current Facility-Administered Medications   Medication Dose Route Frequency    amLODIPine (NORVASC) tablet 5 mg  5 mg Oral Daily    cyanocobalamin (VITAMIN B-12) tablet 1,000 mcg  1,000 mcg Oral Daily    dextrose 5 % bolus 1,000 mL  1,000 mL Intravenous Once    FLUoxetine (PROzac) oral solution 20 mg  20 mg Oral Daily    hydrOXYzine HCL (ATARAX) tablet 25 mg  25 mg Oral Q6H PRN Max 4/day     hydrOXYzine HCL (ATARAX) tablet 50 mg  50 mg Oral Q6H PRN Max 4/day    ibuprofen (MOTRIN) tablet 200 mg  200 mg Oral Q6H PRN    ibuprofen (MOTRIN) tablet 400 mg  400 mg Oral Q6H PRN    ibuprofen (MOTRIN) tablet 600 mg  600 mg Oral Q8H PRN    insulin lispro (HumALOG/ADMELOG) 100 units/mL subcutaneous injection 1-5 Units  1-5 Units Subcutaneous TID AC    insulin lispro (HumALOG/ADMELOG) 100 units/mL subcutaneous injection 1-5 Units  1-5 Units Subcutaneous HS    LORazepam (ATIVAN) injection 1 mg  1 mg Intramuscular Q6H PRN Max 3/day    LORazepam (ATIVAN) tablet 1 mg  1 mg Oral 4x Daily    Or    LORazepam (ATIVAN) injection 1 mg  1 mg Intravenous 4x Daily    LORazepam (ATIVAN) tablet 1 mg  1 mg Oral Q6H PRN Max 3/day    melatonin tablet 3 mg  3 mg Oral HS    Multivitamin liquid 15 mL  15 mL Oral Daily    nicotine polacrilex (NICORETTE) gum 4 mg  4 mg Oral Q2H PRN    OLANZapine (ZyPREXA) IM injection 5 mg  5 mg Intramuscular Q3H PRN Max 3/day    polyethylene glycol (MIRALAX) packet 17 g  17 g Oral Daily PRN    QUEtiapine (SEROquel) tablet 100 mg  100 mg Oral Q3H PRN Max 3/day    QUEtiapine (SEROquel) tablet 25 mg  25 mg Oral Q6H PRN Max 4/day    QUEtiapine (SEROquel) tablet 50 mg  50 mg Oral Q6H PRN Max 4/day    senna-docusate sodium (SENOKOT S) 8.6-50 mg per tablet 1 tablet  1 tablet Oral Daily PRN    traZODone (DESYREL) tablet 50 mg  50 mg Oral HS PRN   , and PTA meds:   Prior to Admission Medications   Prescriptions Last Dose Informant Patient Reported? Taking?   ALPRAZolam (XANAX) 0.25 mg tablet Unknown  Yes No   Sig: Take 0.25 mg by mouth daily at bedtime as needed for anxiety.   ALPRAZolam ER (XANAX XR) 1 MG 24 hr tablet   Yes Yes   Arnuity Ellipta 100 MCG/ACT AEPB inhaler   Yes Yes   Sig: Inhale 100 mcg daily   Biotin 300 MCG TABS Unknown  Yes No   Sig: Take by mouth.   Capsaicin-Menthol 0.025-10 % GEL Unknown  No No   Sig: Apply 1 small application topically 3 (three) times a day as needed (pain)    Cholecalciferol 25 MCG (1000 UT) CHEW   Yes Yes   Sig: Chew 2 tablets by mouth once daily   Incontinence Supply Disposable (Attends Underwear 7 Large) MISC   Yes Yes   PARoxetine (PAXIL) 30 mg tablet Unknown  Yes No   Sig: Take 30 mg by mouth 2 (two) times a day.   albuterol (PROVENTIL HFA,VENTOLIN HFA) 90 mcg/act inhaler   Yes Yes   Sig: Inhale 2 puffs every 6 (six) hours as needed   ammonium lactate (LAC-HYDRIN) 12 % lotion   Yes Yes   Sig: Apply topically   aspirin (ECOTRIN LOW STRENGTH) 81 mg EC tablet   Yes No   Sig: Take 81 mg by mouth   atorvastatin (LIPITOR) 10 mg tablet   Yes Yes   Sig: Take 10 mg by mouth daily   busPIRone (BUSPAR) 10 mg tablet   Yes Yes   Sig: Take 10 mg by mouth 2 (two) times a day   cloZAPine (CLOZARIL) 100 mg tablet   Yes Yes   Sig: Take 1 tablet at night with the 200mg strength tablet   clonazePAM (KlonoPIN) 1 mg tablet Unknown  Yes No   Sig: Take 1 mg by mouth 2 (two) times a day as needed for seizures.   clozapine (CLOZARIL) 200 MG tablet   Yes Yes   Sig: Take 200 mg by mouth 2 (two) times a day   famotidine (PEPCID) 20 mg tablet   Yes Yes   Sig: Take 20 mg by mouth 2 (two) times a day   metFORMIN (GLUCOPHAGE) 500 mg tablet Unknown  Yes No   Sig: Take 500 mg by mouth 2 (two) times a day with meals.      Facility-Administered Medications: None     No Known Allergies    Objective      Mental Status Evaluation:  Appearance and attitude: appeared as stated age, guarded, uncooperative, disheveled, with fair hygiene, suckling her thumb  Eye contact: good and staring gaze  Motor Function: PMR  Gait/station: Not observed  Speech:  mute  Language: Unable to assess due to patient factors  Mood/affect: anxious / Affect was constricted, increased in intensity  Thought Processes: patient is non-verbal  Thought content:  unable to assess; not cooperative  Associations: patient is non-verbal  Perceptual disturbances: denies Auditory/Visual/Tactile Hallucinations, appears responding to internal  "stimuli, unable to fully assess  Orientation: not cooperative  Cognitive Function: not able to fully assess  Memory: unable to assess  Intellect: unable to assess  Fund of knowledge: unable to assess due to patient factors  Impulse control: poor  Insight/judgment: impaired/impaired    Pain : unable to assess      Lab Results: I have personally reviewed pertinent lab results.        WBC   Date Value Ref Range Status   07/12/2024 8.97 4.31 - 10.16 Thousand/uL Final     WBC, CSF   Date Value Ref Range Status   07/06/2024 7 (H) 0 - 5 /uL Final     WBC, UA   Date Value Ref Range Status   07/02/2024 4-10 (A) None Seen, 1-2 /hpf Final     MCV   Date Value Ref Range Status   07/12/2024 92 82 - 98 fL Final     Lab Results   Component Value Date    BUN 26 (H) 07/12/2024    SODIUM 148 (H) 07/12/2024    CO2 26 07/12/2024     Lab Results   Component Value Date    ALKPHOS 78 07/12/2024     No results found for: \"CPK\", \"CKMB\"  Lab Results   Component Value Date    TSH 2.10 05/03/2024     INR   Date Value Ref Range Status   07/05/2024 0.90 0.84 - 1.19 Final   07/01/2024 1.04 0.84 - 1.19 Final     No results found for: \"APTT\"  No results found for: \"PHENO\"  Sodium   Date Value Ref Range Status   07/12/2024 148 (H) 135 - 147 mmol/L Final   05/03/2024 142 135 - 145 mmol/L Final     SODIUM, I-STAT   Date Value Ref Range Status   07/27/2015 139 135 - 145 mmol/L Final     BUN   Date Value Ref Range Status   07/12/2024 26 (H) 5 - 25 mg/dL Final   05/03/2024 15 7 - 25 mg/dL Final     BUN, I-STAT   Date Value Ref Range Status   07/27/2015 6 5 - 27 mg/dL Final     Creatinine   Date Value Ref Range Status   07/12/2024 0.75 0.60 - 1.30 mg/dL Final     Comment:     Standardized to IDMS reference method   05/03/2024 0.93 0.40 - 1.10 mg/dL Final     Creatinine, Ur   Date Value Ref Range Status   04/19/2023 49.3 (L) 50.0 - 200.0 mg/dL Final     CREATININE, I-STAT   Date Value Ref Range Status   07/27/2015 0.7 0.6 - 1.3 mg/dL Final     TSH   Date " "Value Ref Range Status   05/03/2024 2.10 0.45 - 5.33 uIU/mL Final     TSH 3RD GENERATON   Date Value Ref Range Status   07/12/2024 1.543 0.450 - 4.500 uIU/mL Final     Comment:     The recommended reference ranges for TSH during pregnancy are as follows:   First trimester 0.100 to 2.500 uIU/mL   Second trimester  0.200 to 3.000 uIU/mL   Third trimester 0.300 to 3.000 uIU/m    Note: Normal ranges may not apply to patients who are transgender, non-binary, or whose legal sex, sex at birth, and gender identity differ.  Adult TSH (3rd generation) reference range follows the recommended guidelines of the [employer], January, 2020.     WBC   Date Value Ref Range Status   07/12/2024 8.97 4.31 - 10.16 Thousand/uL Final     WBC, CSF   Date Value Ref Range Status   07/06/2024 7 (H) 0 - 5 /uL Final     WBC, UA   Date Value Ref Range Status   07/02/2024 4-10 (A) None Seen, 1-2 /hpf Final     No components found for: \"B12\"  Lab Results   Component Value Date    FOLATE >22.3 07/03/2024     No results found for: \"RPR\"      Imaging Studies: Reviewed.  No orders to display       EKG, Pathology, and Other Studies: Reviewed. and New EKG ordered.    Code Status:Full code    Patient Strengths/Assets: good past treatment response    Patient Barriers/Limitations: difficulty adapting, impaired cognition, noncompliant with medication, patient is on an involuntary commitment, patient is unwilling to work on problems, poor insight, poor physical health, poor reasoning ability, poor self-care, poor support system    Suicide/Homicide Risk Assessment:    Risk of Harm to Self:   Nursing Suicide Risk Assessment Last 24 hours: C-SSRS Risk (Since Last Contact)  Calculated C-SSRS Risk Score (Since Last Contact): No Risk Indicated  Current Specific Risk Factors include: unable to contract for safety on the unit, AMS  Protective Factors: no current protective factors  Based on today's assessment, Meli presents the following risk of " harm to self:  unable to assess; 1:1 for safety    Risk of Harm to Others:  Nursing Homicide Risk Assessment: Violence Risk to Others: Unable to assess- Unwilling or unable to answer questions (Comment)  Current Specific Risk Factors include: behavior suggesting loss of control, noncompliance with treatment  Protective Factors:  unable to assess  Based on today's assessment, Meli presents the following risk of harm to others:  unable to assess; 1:1 for safety    The following interventions are recommended: continued hospitalization on locked unit, 1:1 for safety and fall risk    Assessment & Plan     Principal Problem:    Schizoaffective disorder, bipolar type (HCC)  Active Problems:    Medical clearance for psychiatric admission    Type 2 diabetes mellitus ([facility])    Obesity    Hypernatremia    Agoraphobia with panic disorder    Posttraumatic stress disorder    Catatonia    Hypokalemia    Plan:   Risks, benefits and possible side effects of Medications:   Patient does not verbalize understanding at this time and will require further explanation.       - Encourage early mobility and having a structured day  - Provide frequent re-orientation, and cognitive stimulation  - Ensure assistive devices are in proper working order (eye-glasses, hearing aids)  - Encourage adequate hydration, nutrition and monitor bowel movements  - Maintain sleep-wake cycle: Uninterrupted sleep time; low-level lighting at night  - Fall precaution  - 1:1 for fall risk and safety  - Expand collat information  - f/u SLIM recs regarding the medical problems   - f/u labs  - Continue medication titration and treatment plan; adjust medication to optimize treatment response and as clinically indicated.     Scheduled medications:  Current Facility-Administered Medications   Medication Dose Route Frequency Provider Last Rate    amLODIPine  5 mg Oral Daily JOSE ELIAS Ortega      cyanocobalamin  1,000 mcg Oral Daily JOSE ELIAS Figueroa       dextrose  1,000 mL Intravenous Once Kristen Archuletamaynor, CRNP      FLUoxetine  20 mg Oral Daily Dereje Banuelos MD      hydrOXYzine HCL  25 mg Oral Q6H PRN Max 4/day Marie Ziegler, CRNP      hydrOXYzine HCL  50 mg Oral Q6H PRN Max 4/day Marie Ziegler, CRNP      ibuprofen  200 mg Oral Q6H PRN Marie Ziegler, CRNP      ibuprofen  400 mg Oral Q6H PRN Marie Ziegler, CRNP      ibuprofen  600 mg Oral Q8H PRN Marie Ziegler, CRNP      insulin lispro  1-5 Units Subcutaneous TID AC Kristen Archuletamaynor, CRNP      insulin lispro  1-5 Units Subcutaneous HS Kristen Ludwig Doreen, CRNP      LORazepam  1 mg Intramuscular Q6H PRN Max 3/day Marie Ziegler, CRNP      LORazepam  1 mg Oral 4x Daily Marie Ziegler, CRNP      Or    LORazepam  1 mg Intravenous 4x Daily Marie Ziegler, CRNP      LORazepam  1 mg Oral Q6H PRN Max 3/day Marie Ziegler, CRNP      melatonin  3 mg Oral HS Marie Ziegler, CRNP      Multivitamin  15 mL Oral Daily Kristen Ludwig Doreen, CRNP      nicotine polacrilex  4 mg Oral Q2H PRN Marie Ziegler, CRNP      OLANZapine  5 mg Intramuscular Q3H PRN Max 3/day Marie Ziegler, CRNP      polyethylene glycol  17 g Oral Daily PRN Marie Ziegler, CRNP      QUEtiapine  100 mg Oral Q3H PRN Max 3/day Marie Ziegler, CRNP      QUEtiapine  25 mg Oral Q6H PRN Max 4/day Marie Ziegler, CRNP      QUEtiapine  50 mg Oral Q6H PRN Max 4/day Marie Ziegler, CRNP      senna-docusate sodium  1 tablet Oral Daily PRN Marie Ziegler, CRNP      traZODone  50 mg Oral HS PRN Marie Ziegler, CRNP          PRN:    hydrOXYzine HCL    hydrOXYzine HCL    ibuprofen    ibuprofen    ibuprofen    LORazepam    LORazepam    nicotine polacrilex    OLANZapine    polyethylene glycol    QUEtiapine    QUEtiapine    QUEtiapine    senna-docusate sodium    traZODone    Diet:       Diet Orders   (From admission, onward)                 Start     Ordered    07/12/24 1147  Dietary nutrition supplements  Once        Question Answer  Comment   Select Supplement: Ensure Compact-Vanilla    Select Supplement: Ensure Compact-Chocolate    Frequency Breakfast, Lunch, Dinner        07/12/24 1146    07/11/24 1630  Diet Yves/CHO Controlled; Consistent Carbohydrate Diet Level 2 (5 carb servings/75 grams CHO/meal)  Diet effective midnight        References:    Adult Nutrition Support Algorithm    RD Therapeutic Diet Order Protocol   Question Answer Comment   Diet Type Yves/CHO Controlled    Yves/CHO Controlled Consistent Carbohydrate Diet Level 2 (5 carb servings/75 grams CHO/meal)    RD to adjust diet per protocol? No        07/11/24 1629                     - Observation: 1:1 for fall risk and safety  - VS: as per unit protocol  - Legal status: 303     - Encourage group attendance and milieu therapy     - Dispo: To be determined       Next of Kin  Extended Emergency Contact Information  Primary Emergency Contact: Chari Nowak  Mobile Phone: [phone]  Relation: Daughter  Secondary Emergency Contact: Conchita Hall  Home Phone: [phone]  Mobile Phone: [phone]  Relation: Other    Dereje Banuelos MD  Attending Psychiatrist   [facility]      This note was completed in part utilizing Dragon dictation Software. Grammatical, translation, syntax errors, random word insertions, spelling mistakes, and incomplete sentences may be an occasional consequence of this system secondary to software limitations with voice recognition, ambient noise, and hardware issues. If you have any questions or concerns about the content, text, or information contained within the body of this dictation, please contact the provider for clarification.

## 2024-07-12 NOTE — ASSESSMENT & PLAN NOTE
Not eating or drinking   Non verbal   Following minimal commands  Will give D5W 1 liter bolus x1   Will give KCL 40 mEq oral solution   Monitor oral intake closely  Repeat BMP in am

## 2024-07-12 NOTE — PROGRESS NOTES
07/12/24 1312   Housing Stability   In the last 12 months, was there a time when you were not able to pay the mortgage or rent on time? N   In the past 12 months, how many times have you moved where you were living? 0   At any time in the past 12 months, were you homeless or living in a shelter (including now)? N   Transportation Needs   In the past 12 months, has lack of transportation kept you from medical appointments or from getting medications? no   In the past 12 months, has lack of transportation kept you from meetings, work, or from getting things needed for daily living? No   Food Insecurity   Within the past 12 months, you worried that your food would run out before you got the money to buy more. Never true   Within the past 12 months, the food you bought just didn't last and you didn't have money to get more. Never true   Intimate Partner Violence   Within the last year, have you been afraid of your partner or ex-partner? No   Within the last year, have you been humiliated or emotionally abused in other ways by your partner or ex-partner? No   Within the last year, have you been kicked, hit, slapped, or otherwise physically hurt by your partner or ex-partner? No   Within the last year, have you been raped or forced to have any kind of sexual activity by your partner or ex-partner? No   Utilities   In the past 12 months has the electric, gas, oil, or water company threatened to shut off services in your home? No       Responses to question provided by the patient's ICM while the patient was on the medical floor.

## 2024-07-13 ENCOUNTER — HOSPITAL ENCOUNTER (INPATIENT)
Facility: HOSPITAL | Age: 59
LOS: 10 days | DRG: 641 | End: 2024-07-23
Attending: INTERNAL MEDICINE | Admitting: INTERNAL MEDICINE
Payer: MEDICARE

## 2024-07-13 VITALS
BODY MASS INDEX: 35.43 KG/M2 | RESPIRATION RATE: 16 BRPM | SYSTOLIC BLOOD PRESSURE: 178 MMHG | WEIGHT: 233 LBS | TEMPERATURE: 98.7 F | OXYGEN SATURATION: 92 % | HEART RATE: 115 BPM | DIASTOLIC BLOOD PRESSURE: 80 MMHG

## 2024-07-13 DIAGNOSIS — F06.1 CATATONIA: Primary | ICD-10-CM

## 2024-07-13 DIAGNOSIS — Z00.8 MEDICAL CLEARANCE FOR PSYCHIATRIC ADMISSION: ICD-10-CM

## 2024-07-13 PROBLEM — I10 ESSENTIAL (PRIMARY) HYPERTENSION: Status: ACTIVE | Noted: 2024-07-13

## 2024-07-13 LAB
ANION GAP SERPL CALCULATED.3IONS-SCNC: 12 MMOL/L (ref 4–13)
ATRIAL RATE: 86 BPM
ATRIAL RATE: 87 BPM
ATRIAL RATE: 90 BPM
BASOPHILS # BLD AUTO: 0.07 THOUSANDS/ÂΜL (ref 0–0.1)
BASOPHILS NFR BLD AUTO: 1 % (ref 0–1)
BUN SERPL-MCNC: 26 MG/DL (ref 5–25)
CALCIUM SERPL-MCNC: 9.7 MG/DL (ref 8.4–10.2)
CHLORIDE SERPL-SCNC: 109 MMOL/L (ref 96–108)
CO2 SERPL-SCNC: 26 MMOL/L (ref 21–32)
CREAT SERPL-MCNC: 0.81 MG/DL (ref 0.6–1.3)
EOSINOPHIL # BLD AUTO: 0 THOUSAND/ÂΜL (ref 0–0.61)
EOSINOPHIL NFR BLD AUTO: 0 % (ref 0–6)
ERYTHROCYTE [DISTWIDTH] IN BLOOD BY AUTOMATED COUNT: 16.8 % (ref 11.6–15.1)
GFR SERPL CREATININE-BSD FRML MDRD: 80 ML/MIN/1.73SQ M
GLUCOSE P FAST SERPL-MCNC: 89 MG/DL (ref 65–99)
GLUCOSE SERPL-MCNC: 101 MG/DL (ref 65–140)
GLUCOSE SERPL-MCNC: 101 MG/DL (ref 65–140)
GLUCOSE SERPL-MCNC: 112 MG/DL (ref 65–140)
GLUCOSE SERPL-MCNC: 121 MG/DL (ref 65–140)
GLUCOSE SERPL-MCNC: 89 MG/DL (ref 65–140)
GLUCOSE SERPL-MCNC: 99 MG/DL (ref 65–140)
HCT VFR BLD AUTO: 46.6 % (ref 34.8–46.1)
HGB BLD-MCNC: 14 G/DL (ref 11.5–15.4)
IMM GRANULOCYTES # BLD AUTO: 0.06 THOUSAND/UL (ref 0–0.2)
IMM GRANULOCYTES NFR BLD AUTO: 1 % (ref 0–2)
LYMPHOCYTES # BLD AUTO: 2.58 THOUSANDS/ÂΜL (ref 0.6–4.47)
LYMPHOCYTES NFR BLD AUTO: 24 % (ref 14–44)
MAGNESIUM SERPL-MCNC: 2 MG/DL (ref 1.9–2.7)
MCH RBC QN AUTO: 28.2 PG (ref 26.8–34.3)
MCHC RBC AUTO-ENTMCNC: 30 G/DL (ref 31.4–37.4)
MCV RBC AUTO: 94 FL (ref 82–98)
MONOCYTES # BLD AUTO: 1.27 THOUSAND/ÂΜL (ref 0.17–1.22)
MONOCYTES NFR BLD AUTO: 12 % (ref 4–12)
NEUTROPHILS # BLD AUTO: 6.94 THOUSANDS/ÂΜL (ref 1.85–7.62)
NEUTS SEG NFR BLD AUTO: 62 % (ref 43–75)
NRBC BLD AUTO-RTO: 0 /100 WBCS
P AXIS: 38 DEGREES
P AXIS: 39 DEGREES
P AXIS: 57 DEGREES
PHOSPHATE SERPL-MCNC: 3.5 MG/DL (ref 2.7–4.5)
PLATELET # BLD AUTO: 303 THOUSANDS/UL (ref 149–390)
PMV BLD AUTO: 9.9 FL (ref 8.9–12.7)
POTASSIUM SERPL-SCNC: 3.9 MMOL/L (ref 3.5–5.3)
PR INTERVAL: 154 MS
PR INTERVAL: 156 MS
PR INTERVAL: 162 MS
QRS AXIS: 17 DEGREES
QRS AXIS: 20 DEGREES
QRS AXIS: 49 DEGREES
QRSD INTERVAL: 92 MS
QRSD INTERVAL: 96 MS
QRSD INTERVAL: 98 MS
QT INTERVAL: 366 MS
QT INTERVAL: 374 MS
QT INTERVAL: 378 MS
QTC INTERVAL: 447 MS
QTC INTERVAL: 447 MS
QTC INTERVAL: 454 MS
RBC # BLD AUTO: 4.97 MILLION/UL (ref 3.81–5.12)
SODIUM SERPL-SCNC: 147 MMOL/L (ref 135–147)
T WAVE AXIS: 71 DEGREES
T WAVE AXIS: 76 DEGREES
T WAVE AXIS: 82 DEGREES
VENTRICULAR RATE: 86 BPM
VENTRICULAR RATE: 87 BPM
VENTRICULAR RATE: 90 BPM
WBC # BLD AUTO: 10.92 THOUSAND/UL (ref 4.31–10.16)

## 2024-07-13 PROCEDURE — 84100 ASSAY OF PHOSPHORUS: CPT | Performed by: NURSE PRACTITIONER

## 2024-07-13 PROCEDURE — 99223 1ST HOSP IP/OBS HIGH 75: CPT | Performed by: INTERNAL MEDICINE

## 2024-07-13 PROCEDURE — 93010 ELECTROCARDIOGRAM REPORT: CPT | Performed by: INTERNAL MEDICINE

## 2024-07-13 PROCEDURE — 85025 COMPLETE CBC W/AUTO DIFF WBC: CPT | Performed by: NURSE PRACTITIONER

## 2024-07-13 PROCEDURE — 93005 ELECTROCARDIOGRAM TRACING: CPT

## 2024-07-13 PROCEDURE — 80048 BASIC METABOLIC PNL TOTAL CA: CPT | Performed by: NURSE PRACTITIONER

## 2024-07-13 PROCEDURE — 99238 HOSP IP/OBS DSCHRG MGMT 30/<: CPT | Performed by: STUDENT IN AN ORGANIZED HEALTH CARE EDUCATION/TRAINING PROGRAM

## 2024-07-13 PROCEDURE — 83735 ASSAY OF MAGNESIUM: CPT | Performed by: NURSE PRACTITIONER

## 2024-07-13 PROCEDURE — 82948 REAGENT STRIP/BLOOD GLUCOSE: CPT

## 2024-07-13 RX ORDER — HYDRALAZINE HYDROCHLORIDE 20 MG/ML
10 INJECTION INTRAMUSCULAR; INTRAVENOUS EVERY 4 HOURS PRN
Status: DISCONTINUED | OUTPATIENT
Start: 2024-07-13 | End: 2024-07-23 | Stop reason: HOSPADM

## 2024-07-13 RX ORDER — FLUOXETINE 20 MG/5ML
20 SOLUTION ORAL DAILY
Status: DISCONTINUED | OUTPATIENT
Start: 2024-07-14 | End: 2024-07-14

## 2024-07-13 RX ORDER — CLOZAPINE 25 MG/1
25 TABLET ORAL
Status: DISCONTINUED | OUTPATIENT
Start: 2024-07-13 | End: 2024-07-16

## 2024-07-13 RX ORDER — INSULIN LISPRO 100 [IU]/ML
1-5 INJECTION, SOLUTION INTRAVENOUS; SUBCUTANEOUS
Status: DISCONTINUED | OUTPATIENT
Start: 2024-07-13 | End: 2024-07-23 | Stop reason: HOSPADM

## 2024-07-13 RX ORDER — CLONIDINE 0.1 MG/24H
0.1 PATCH, EXTENDED RELEASE TRANSDERMAL WEEKLY
Status: DISCONTINUED | OUTPATIENT
Start: 2024-07-13 | End: 2024-07-23 | Stop reason: HOSPADM

## 2024-07-13 RX ORDER — HYDRALAZINE HYDROCHLORIDE 25 MG/1
25 TABLET, FILM COATED ORAL ONCE
Status: DISCONTINUED | OUTPATIENT
Start: 2024-07-13 | End: 2024-07-13

## 2024-07-13 RX ORDER — LORAZEPAM 2 MG/ML
1 INJECTION INTRAMUSCULAR 4 TIMES DAILY
Status: DISCONTINUED | OUTPATIENT
Start: 2024-07-13 | End: 2024-07-23 | Stop reason: HOSPADM

## 2024-07-13 RX ORDER — ACETAMINOPHEN 160 MG/5ML
650 SUSPENSION ORAL EVERY 4 HOURS PRN
Status: DISCONTINUED | OUTPATIENT
Start: 2024-07-13 | End: 2024-07-23 | Stop reason: HOSPADM

## 2024-07-13 RX ORDER — CLONIDINE 0.1 MG/24H
0.1 PATCH, EXTENDED RELEASE TRANSDERMAL WEEKLY
Status: DISCONTINUED | OUTPATIENT
Start: 2024-07-13 | End: 2024-07-13 | Stop reason: HOSPADM

## 2024-07-13 RX ORDER — ONDANSETRON 2 MG/ML
4 INJECTION INTRAMUSCULAR; INTRAVENOUS EVERY 4 HOURS PRN
Status: DISCONTINUED | OUTPATIENT
Start: 2024-07-13 | End: 2024-07-23 | Stop reason: HOSPADM

## 2024-07-13 RX ORDER — CALCIUM CARB/VITAMIN D3/VIT K1 500-500-40
15 TABLET,CHEWABLE ORAL DAILY
Status: DISCONTINUED | OUTPATIENT
Start: 2024-07-14 | End: 2024-07-14

## 2024-07-13 RX ORDER — ENOXAPARIN SODIUM 100 MG/ML
40 INJECTION SUBCUTANEOUS DAILY
Status: DISCONTINUED | OUTPATIENT
Start: 2024-07-13 | End: 2024-07-23 | Stop reason: HOSPADM

## 2024-07-13 RX ORDER — HYDRALAZINE HYDROCHLORIDE 20 MG/ML
5 INJECTION INTRAMUSCULAR; INTRAVENOUS EVERY 6 HOURS PRN
Status: DISCONTINUED | OUTPATIENT
Start: 2024-07-13 | End: 2024-07-13

## 2024-07-13 RX ORDER — LORAZEPAM 1 MG/1
1 TABLET ORAL 4 TIMES DAILY
Status: DISCONTINUED | OUTPATIENT
Start: 2024-07-13 | End: 2024-07-23 | Stop reason: HOSPADM

## 2024-07-13 RX ORDER — HYDRALAZINE HYDROCHLORIDE 20 MG/ML
5 INJECTION INTRAMUSCULAR; INTRAVENOUS ONCE
Status: COMPLETED | OUTPATIENT
Start: 2024-07-13 | End: 2024-07-13

## 2024-07-13 RX ADMIN — FLUTICASONE FUROATE 1 PUFF: 100 POWDER RESPIRATORY (INHALATION) at 14:44

## 2024-07-13 RX ADMIN — CLOZAPINE 25 MG: 25 TABLET ORAL at 21:28

## 2024-07-13 RX ADMIN — LORAZEPAM 1 MG: 1 TABLET ORAL at 04:35

## 2024-07-13 RX ADMIN — LORAZEPAM 1 MG: 2 INJECTION INTRAMUSCULAR; INTRAVENOUS at 12:54

## 2024-07-13 RX ADMIN — HYDRALAZINE HYDROCHLORIDE 5 MG: 20 INJECTION, SOLUTION INTRAMUSCULAR; INTRAVENOUS at 09:37

## 2024-07-13 RX ADMIN — ENOXAPARIN SODIUM 40 MG: 40 INJECTION SUBCUTANEOUS at 14:29

## 2024-07-13 RX ADMIN — CLONIDINE 0.1 MG: 0.1 PATCH TRANSDERMAL at 14:29

## 2024-07-13 RX ADMIN — NICOTINE 1 PATCH: 7 PATCH, EXTENDED RELEASE TRANSDERMAL at 14:29

## 2024-07-13 RX ADMIN — LORAZEPAM 1 MG: 2 INJECTION INTRAMUSCULAR; INTRAVENOUS at 09:30

## 2024-07-13 RX ADMIN — LORAZEPAM 1 MG: 2 INJECTION INTRAMUSCULAR; INTRAVENOUS at 21:28

## 2024-07-13 RX ADMIN — HYDRALAZINE HYDROCHLORIDE 10 MG: 20 INJECTION, SOLUTION INTRAMUSCULAR; INTRAVENOUS at 23:42

## 2024-07-13 RX ADMIN — LORAZEPAM 1 MG: 2 INJECTION INTRAMUSCULAR; INTRAVENOUS at 17:04

## 2024-07-13 NOTE — ASSESSMENT & PLAN NOTE
Hypernatremia secondary to free water deficit.  Will place NG tube and start tube feeds/free water    Results from last 7 days   Lab Units 07/13/24  0859 07/12/24  0513 07/10/24  0539 07/09/24  0807   SODIUM mmol/L 147 148* 146 143

## 2024-07-13 NOTE — ASSESSMENT & PLAN NOTE
Elevated blood pressures due to refusal to take pills.  Was recently started on amlodipine.  Will start clonidine patch

## 2024-07-13 NOTE — PLAN OF CARE
Problem: PAIN - ADULT  Goal: Verbalizes/displays adequate comfort level or baseline comfort level  Description: Interventions:  - Encourage patient to monitor pain and request assistance  - Assess pain using appropriate pain scale  - Administer analgesics based on type and severity of pain and evaluate response  - Implement non-pharmacological measures as appropriate and evaluate response  - Consider cultural and social influences on pain and pain management  - Notify physician/advanced practitioner if interventions unsuccessful or patient reports new pain  Outcome: Progressing     Problem: INFECTION - ADULT  Goal: Absence or prevention of progression during hospitalization  Description: INTERVENTIONS:  - Assess and monitor for signs and symptoms of infection  - Monitor lab/diagnostic results  - Monitor all insertion sites, i.e. indwelling lines, tubes, and drains  - Monitor endotracheal if appropriate and nasal secretions for changes in amount and color  - [place] appropriate cooling/warming therapies per order  - Administer medications as ordered  - Instruct and encourage patient and family to use good hand hygiene technique  - Identify and instruct in appropriate isolation precautions for identified infection/condition  Outcome: Progressing  Goal: Absence of fever/infection during neutropenic period  Description: INTERVENTIONS:  - Monitor WBC    Outcome: Progressing     Problem: SAFETY ADULT  Goal: Patient will remain free of falls  Description: INTERVENTIONS:  - Educate patient/family on patient safety including physical limitations  - Instruct patient to call for assistance with activity   - Consult OT/PT to assist with strengthening/mobility   - Keep Call bell within reach  - Keep bed low and locked with side rails adjusted as appropriate  - Keep care items and personal belongings within reach  - Initiate and maintain comfort rounds  - Make Fall Risk Sign visible to staff  - Offer Toileting every 2 Hours,  in advance of need  - Initiate/Maintain bed alarm  - Obtain necessary fall risk management equipment: bed alarm  - Apply yellow socks and bracelet for high fall risk patients  - Consider moving patient to room near nurses station  Outcome: Progressing  Goal: Maintain or return to baseline ADL function  Description: INTERVENTIONS:  -  Assess patient's ability to carry out ADLs; assess patient's baseline for ADL function and identify physical deficits which impact ability to perform ADLs (bathing, care of mouth/teeth, toileting, grooming, dressing, etc.)  - Assess/evaluate cause of self-care deficits   - Assess range of motion  - Assess patient's mobility; develop plan if impaired  - Assess patient's need for assistive devices and provide as appropriate  - Encourage maximum independence but intervene and supervise when necessary  - Involve family in performance of ADLs  - Assess for home care needs following discharge   - Consider OT consult to assist with ADL evaluation and planning for discharge  - Provide patient education as appropriate  Outcome: Progressing  Goal: Maintains/Returns to pre admission functional level  Description: INTERVENTIONS:  - Perform AM-PAC 6 Click Basic Mobility/ Daily Activity assessment daily.  - Set and communicate daily mobility goal to care team and patient/family/caregiver.   - Collaborate with rehabilitation services on mobility goals if consulted  - Perform Range of Motion 2 times a day.  - Reposition patient every 2 hours.  - Dangle patient 2 times a day  - Stand patient 2 times a day  - Ambulate patient 2 times a day  - Out of bed to chair 2 times a day   - Out of bed for meals 2 times a day  - Out of bed for toileting  - Record patient progress and toleration of activity level   Outcome: Progressing     Problem: DISCHARGE PLANNING  Goal: Discharge to home or other facility with appropriate resources  Description: INTERVENTIONS:  - Identify barriers to discharge w/patient and  caregiver  - Arrange for needed discharge resources and transportation as appropriate  - Identify discharge learning needs (meds, wound care, etc.)  - Arrange for interpretive services to assist at discharge as needed  - Refer to Case Management Department for coordinating discharge planning if the patient needs post-hospital services based on physician/advanced practitioner order or complex needs related to functional status, cognitive ability, or social support system  Outcome: Progressing     Problem: Knowledge Deficit  Goal: Patient/family/caregiver demonstrates understanding of disease process, treatment plan, medications, and discharge instructions  Description: Complete learning assessment and assess knowledge base.  Interventions:  - Provide teaching at level of understanding  - Provide teaching via preferred learning methods  Outcome: Progressing

## 2024-07-13 NOTE — NURSING NOTE
Patient was transferred to  for continuation of treatment at 1305 escorted with security and MHT. AVS, belongings provided. Patient was stable, orientedm calm compliant with transfer process.

## 2024-07-13 NOTE — ASSESSMENT & PLAN NOTE
Lab Results   Component Value Date    HGBA1C 6.4 (H) 05/03/2024     Recent Labs     07/12/24  2102 07/13/24  0345 07/13/24  0736 07/13/24  1127   POCGLU 93 99 112 101     Prior to admission on metformin.  Will be placed on sliding scale insulin during medical hospitalization

## 2024-07-13 NOTE — BH TRANSITION RECORD
"Contact Information: If you have any questions, concerns, pended studies, tests and/or procedures, or emergencies regarding your inpatient behavioral health visit. Please contact [place] [facility] [phone] and ask to speak to a , nurse or physician. A contact is available 24 hours/ 7 days a week at this number.     Summary of Procedures Performed During your Stay:  Below is a list of major procedures performed during your hospital stay and a summary of results:  See below:    WBC   Date Value Ref Range Status   07/13/2024 10.92 (H) 4.31 - 10.16 Thousand/uL Final     WBC, CSF   Date Value Ref Range Status   07/06/2024 7 (H) 0 - 5 /uL Final     WBC, UA   Date Value Ref Range Status   07/02/2024 4-10 (A) None Seen, 1-2 /hpf Final     MCV   Date Value Ref Range Status   07/13/2024 94 82 - 98 fL Final     Lab Results   Component Value Date    BUN 26 (H) 07/13/2024    SODIUM 147 07/13/2024    CO2 26 07/13/2024     Lab Results   Component Value Date    ALKPHOS 78 07/12/2024     No results found for: \"CPK\", \"CKMB\"  Lab Results   Component Value Date    TSH 2.10 05/03/2024     INR   Date Value Ref Range Status   07/05/2024 0.90 0.84 - 1.19 Final   07/01/2024 1.04 0.84 - 1.19 Final     No results found for: \"APTT\"  No results found for: \"PHENO\"  Sodium   Date Value Ref Range Status   07/13/2024 147 135 - 147 mmol/L Final   05/03/2024 142 135 - 145 mmol/L Final     SODIUM, I-STAT   Date Value Ref Range Status   07/27/2015 139 135 - 145 mmol/L Final     BUN   Date Value Ref Range Status   07/13/2024 26 (H) 5 - 25 mg/dL Final   05/03/2024 15 7 - 25 mg/dL Final     BUN, I-STAT   Date Value Ref Range Status   07/27/2015 6 5 - 27 mg/dL Final     Creatinine   Date Value Ref Range Status   07/13/2024 0.81 0.60 - 1.30 mg/dL Final     Comment:     Standardized to IDMS reference method   05/03/2024 0.93 0.40 - 1.10 mg/dL Final     Creatinine, Ur   Date Value Ref Range Status " "  04/19/2023 49.3 (L) 50.0 - 200.0 mg/dL Final     CREATININE, I-STAT   Date Value Ref Range Status   07/27/2015 0.7 0.6 - 1.3 mg/dL Final     TSH   Date Value Ref Range Status   05/03/2024 2.10 0.45 - 5.33 uIU/mL Final     TSH 3RD GENERATON   Date Value Ref Range Status   07/12/2024 1.543 0.450 - 4.500 uIU/mL Final     Comment:     The recommended reference ranges for TSH during pregnancy are as follows:   First trimester 0.100 to 2.500 uIU/mL   Second trimester  0.200 to 3.000 uIU/mL   Third trimester 0.300 to 3.000 uIU/m    Note: Normal ranges may not apply to patients who are transgender, non-binary, or whose legal sex, sex at birth, and gender identity differ.  Adult TSH (3rd generation) reference range follows the recommended guidelines of the [employer], January, 2020.     WBC   Date Value Ref Range Status   07/13/2024 10.92 (H) 4.31 - 10.16 Thousand/uL Final     WBC, CSF   Date Value Ref Range Status   07/06/2024 7 (H) 0 - 5 /uL Final     WBC, UA   Date Value Ref Range Status   07/02/2024 4-10 (A) None Seen, 1-2 /hpf Final     No components found for: \"B12\"  Lab Results   Component Value Date    FOLATE >22.3 07/12/2024     No results found for: \"RPR\"    Imaging  No orders to display         Pending Studies (From admission, onward)       Start     Ordered    07/11/24 1630  CSF white cell count with differential  Once        Question:  CSF Test Priority (Sequentially number all CSF tests. 1-first, 2-second, etc):  Answer:  1    07/11/24 1629    07/11/24 1630  Glucose CSF  Once        Question:  CSF Test Priority (Sequentially number all CSF tests. 1-first, 2-second, etc):  Answer:  1    07/11/24 1629    07/11/24 1630  Protein CSF  Once        Question:  CSF Test Priority (Sequentially number all CSF tests. 1-first, 2-second, etc):  Answer:  1    07/11/24 1629    07/11/24 1630  Gram stain CSF  Once        Question:  CSF Test Priority (Sequentially number all CSF tests. 1-first, 2-second, etc): " " Answer:  1    07/11/24 1629    07/11/24 1630  Meningitis/Encephalitis (ME) Panel  (Meningitis Encephalitis Panel with Isolation)  Once        Question Answer Comment   CSF Test Priority (Sequentially number all CSF tests. 1-first, 2-second, etc): 1    Test will cancel if CSF white blood cell count is < 10, and if \"no indications\" is selected from the options below Other ID or neurological disease consideration        07/11/24 1629                  Please follow up on the above pending studies with your PCP and/or referring provider.  "

## 2024-07-13 NOTE — PLAN OF CARE
Problem: Prexisting or High Potential for Compromised Skin Integrity  Goal: Skin integrity is maintained or improved  Description: INTERVENTIONS:  - Identify patients at risk for skin breakdown  - Assess and monitor skin integrity  - Assess and monitor nutrition and hydration status  - Monitor labs   - Assess for incontinence   - Turn and reposition patient  - Assist with mobility/ambulation  - Relieve pressure over bony prominences  - Avoid friction and shearing  - Provide appropriate hygiene as needed including keeping skin clean and dry  - Evaluate need for skin moisturizer/barrier cream  - Collaborate with interdisciplinary team   - Patient/family teaching  - Consider wound care consult   Outcome: Progressing     Problem: Alteration in Thoughts and Perception  Goal: Verbalize thoughts and feelings  Description: Interventions:  - Promote a nonjudgmental and trusting relationship with the patient through active listening and therapeutic communication  - Assess patient's level of functioning, behavior and potential for risk  - Engage patient in 1 on 1 interactions  - Encourage patient to express fears, feelings, frustrations, and discuss symptoms    - [place] patient to reality, help patient recognize reality-based thinking   - Administer medications as ordered and assess for potential side effects  - Provide the patient education related to the signs and symptoms of the illness and desired effects of prescribed medications  Outcome: Not Progressing     Problem: Risk for Self Injury/Neglect  Goal: Refrain from harming self  Description: Interventions:  - Monitor patient closely, per order  - Develop a trusting relationship  - Supervise medication ingestion, monitor effects and side effects   Outcome: Progressing     Problem: Anxiety  Goal: Anxiety is at manageable level  Description: Interventions:  - Assess and monitor patient's anxiety level.   - Monitor for signs and symptoms (heart palpitations, chest pain,  shortness of breath, headaches, nausea, feeling jumpy, restlessness, irritable, apprehensive).   - Collaborate with interdisciplinary team and initiate plan and interventions as ordered.  - [place] patient to unit/surroundings  - Explain treatment plan  - Encourage participation in care  - Encourage verbalization of concerns/fears  - Identify coping mechanisms  - Assist in developing anxiety-reducing skills  - Administer/offer alternative therapies  - Limit or eliminate stimulants  Outcome: Progressing     Problem: Nutrition/Hydration-ADULT  Goal: Nutrient/Hydration intake appropriate for improving, restoring or maintaining nutritional needs  Description: Monitor and assess patient's nutrition/hydration status for malnutrition. Collaborate with interdisciplinary team and initiate plan and interventions as ordered.  Monitor patient's weight and dietary intake as ordered or per policy. Utilize nutrition screening tool and intervene as necessary. Determine patient's food preferences and provide high-protein, high-caloric foods as appropriate.     INTERVENTIONS:  - Monitor oral intake, urinary output, labs, and treatment plans  - Assess nutrition and hydration status and recommend course of action  - Evaluate amount of meals eaten  - Assist patient with eating if necessary   - Allow adequate time for meals  - Recommend/ encourage appropriate diets, oral nutritional supplements, and vitamin/mineral supplements  - Order, calculate, and assess calorie counts as needed  - Recommend, monitor, and adjust tube feedings and TPN/PPN based on assessed needs  - Assess need for intravenous fluids  - Provide specific nutrition/hydration education as appropriate  - Include patient/family/caregiver in decisions related to nutrition  Outcome: Not Progressing

## 2024-07-13 NOTE — NURSING NOTE
Patient remained paranoid in Roddy chair, refused meals/ med when offered orally. Patient was given scheduled Ativan 1 mg IV by nurse supervisor as well as one time hydralazin 5 mg IV at 0937 for /90. When rechecked manually BP at  1049 came silvia to 178/80. Patient is currently drowsy, refuses to talk. Provider is notified. Will continue to monitor.

## 2024-07-13 NOTE — ASSESSMENT & PLAN NOTE
History of reactive airway disease diabetes hypertension and mood disorder transferred from [facility] for medical optimization.  7/2/2024: Admitted to [facility].  7/3/2024: Transferred to medical service for concerns of NMS.  Transferred to [facility] for formal neurology evaluation.  MRI negative.  LP without evidence of infection.  Paraneoplastic panel negative.  7/11/2024: Transferred back to [facility] but has had no oral intake and refusing to take medications.  Reviewed outpatient records.  Previously on buspirone 10 mg twice daily, alprazolam XR 2 mg in the morning and 1 mg at bedtime, clozapine 200 mg in the morning and 300 mg at bedtime, paroxetine 60 mg daily  Once NG tube is in place, will need to restart psychiatric medications in hopes to snap the patient out of her acute psychosis/catatonic state

## 2024-07-13 NOTE — TREATMENT TEAM
07/13/24 0414   Vital Signs   Temperature 98.6 °F (37 °C)   Temp Source Temporal   Blood Pressure (!) 182/68   BP Location Right arm   BP Method Manual     Per 1:1 staff, pt was tachypneic. On assessment, pt visibly anxious, suspicious, tachypneic, and diaphoretic. Blood sugar 99. BP elevated 182/68. Pt initially resistive to offered PO fluids and medication. Consulting provider notified, and informed this nurse to reassess BP in 30 mins. With heavy encouragement, pt accepted PRN Ativan 1 mg at 0435 crushed in peach yogurt. [place] score 33. Pt drank 960 mL fluids with heavy encouragement and ate 4 ounces of yogurt.

## 2024-07-13 NOTE — PLAN OF CARE
Problem: Prexisting or High Potential for Compromised Skin Integrity  Goal: Skin integrity is maintained or improved  Description: INTERVENTIONS:  - Identify patients at risk for skin breakdown  - Assess and monitor skin integrity  - Assess and monitor nutrition and hydration status  - Monitor labs   - Assess for incontinence   - Turn and reposition patient  - Assist with mobility/ambulation  - Relieve pressure over bony prominences  - Avoid friction and shearing  - Provide appropriate hygiene as needed including keeping skin clean and dry  - Evaluate need for skin moisturizer/barrier cream  - Collaborate with interdisciplinary team   - Patient/family teaching  - Consider wound care consult   Outcome: Completed     Problem: Alteration in Thoughts and Perception  Goal: Treatment Goal: Gain control of psychotic behaviors/thinking, reduce/eliminate presenting symptoms and demonstrate improved reality functioning upon discharge  Outcome: Completed  Goal: Verbalize thoughts and feelings  Description: Interventions:  - Promote a nonjudgmental and trusting relationship with the patient through active listening and therapeutic communication  - Assess patient's level of functioning, behavior and potential for risk  - Engage patient in 1 on 1 interactions  - Encourage patient to express fears, feelings, frustrations, and discuss symptoms    - [place] patient to reality, help patient recognize reality-based thinking   - Administer medications as ordered and assess for potential side effects  - Provide the patient education related to the signs and symptoms of the illness and desired effects of prescribed medications  Outcome: Completed  Goal: Refrain from acting on delusional thinking/internal stimuli  Description: Interventions:  - Monitor patient closely, per order   - Utilize least restrictive measures   - Set reasonable limits, give positive feedback for acceptable   - Administer medications as ordered and monitor of  potential side effects  Outcome: Completed  Goal: Agree to be compliant with medication regime, as prescribed and report medication side effects  Description: Interventions:  - Offer appropriate PRN medication and supervise ingestion; conduct AIMS, as needed   Outcome: Completed  Goal: Attend and participate in unit activities, including therapeutic, recreational, and educational groups  Description: Interventions:  -Encourage Visitation and family involvement in care  Outcome: Completed  Goal: Recognize dysfunctional thoughts, communicate reality-based thoughts at the time of discharge  Description: Interventions:  - Provide medication and psycho-education to assist patient in compliance and developing insight into his/her illness   Outcome: Completed  Goal: Complete daily ADLs, including personal hygiene independently, as able  Description: Interventions:  - Observe, teach, and assist patient with ADLS  - Monitor and promote a balance of rest/activity, with adequate nutrition and elimination   Outcome: Completed     Problem: Risk for Self Injury/Neglect  Goal: Treatment Goal: Remain safe during length of stay, learn and adopt new coping skills, and be free of self-injurious ideation, impulses and acts at the time of discharge  Outcome: Completed  Goal: Verbalize thoughts and feelings  Description: Interventions:  - Assess and re-assess patient's lethality and potential for self-injury  - Engage patient in 1:1 interactions, daily, for a minimum of 15 minutes  - Encourage patient to express feelings, fears, frustrations, hopes  - Establish rapport/trust with patient   Outcome: Completed  Goal: Refrain from harming self  Description: Interventions:  - Monitor patient closely, per order  - Develop a trusting relationship  - Supervise medication ingestion, monitor effects and side effects   Outcome: Completed  Goal: Attend and participate in unit activities, including therapeutic, recreational, and educational  groups  Description: Interventions:  - Provide therapeutic and educational activities daily, encourage attendance and participation, and document same in the medical record  - Obtain collateral information, encourage visitation and family involvement in care   Outcome: Completed  Goal: Recognize maladaptive responses and adopt new coping mechanisms  Outcome: Completed  Goal: Complete daily ADLs, including personal hygiene independently, as able  Description: Interventions:  - Observe, teach, and assist patient with ADLS  - Monitor and promote a balance of rest/activity, with adequate nutrition and elimination  Outcome: Completed     Problem: Anxiety  Goal: Anxiety is at manageable level  Description: Interventions:  - Assess and monitor patient's anxiety level.   - Monitor for signs and symptoms (heart palpitations, chest pain, shortness of breath, headaches, nausea, feeling jumpy, restlessness, irritable, apprehensive).   - Collaborate with interdisciplinary team and initiate plan and interventions as ordered.  - [place] patient to unit/surroundings  - Explain treatment plan  - Encourage participation in care  - Encourage verbalization of concerns/fears  - Identify coping mechanisms  - Assist in developing anxiety-reducing skills  - Administer/offer alternative therapies  - Limit or eliminate stimulants  Outcome: Completed     Problem: DISCHARGE PLANNING - CARE MANAGEMENT  Goal: Discharge to post-acute care or home with appropriate resources  Description: INTERVENTIONS:  - Conduct assessment to determine patient/family and health care team treatment goals, and need for post-acute services based on payer coverage, community resources, and patient preferences, and barriers to discharge  - Address psychosocial, clinical, and financial barriers to discharge as identified in assessment in conjunction with the patient/family and health care team  - Arrange appropriate level of post-acute services according to patient’s    needs and preference and payer coverage in collaboration with the physician and health care team  - Communicate with and update the patient/family, physician, and health care team regarding progress on the discharge plan  - Arrange appropriate transportation to post-acute venues  Outcome: Completed     Problem: Nutrition/Hydration-ADULT  Goal: Nutrient/Hydration intake appropriate for improving, restoring or maintaining nutritional needs  Description: Monitor and assess patient's nutrition/hydration status for malnutrition. Collaborate with interdisciplinary team and initiate plan and interventions as ordered.  Monitor patient's weight and dietary intake as ordered or per policy. Utilize nutrition screening tool and intervene as necessary. Determine patient's food preferences and provide high-protein, high-caloric foods as appropriate.     INTERVENTIONS:  - Monitor oral intake, urinary output, labs, and treatment plans  - Assess nutrition and hydration status and recommend course of action  - Evaluate amount of meals eaten  - Assist patient with eating if necessary   - Allow adequate time for meals  - Recommend/ encourage appropriate diets, oral nutritional supplements, and vitamin/mineral supplements  - Order, calculate, and assess calorie counts as needed  - Recommend, monitor, and adjust tube feedings and TPN/PPN based on assessed needs  - Assess need for intravenous fluids  - Provide specific nutrition/hydration education as appropriate  - Include patient/family/caregiver in decisions related to nutrition  Outcome: Completed

## 2024-07-13 NOTE — NURSING NOTE
Pt visible sitting in shakeel chair at start of shift. Patient sat with eyes closed and did not respond verbally to this writer upon assessment. Patient continues to refuse food/fluids. Patient refused HS PO Ativan so Ativan administered by other RN via IV push per order. Patient remains on close proximity 1:1 for fall risk and safety. Patient's HS BP elevated when taken automatically, but upon reassessment was 152/78 manually. Provider placed PRN order for IVP hydralazine for SBP >180 or DBP>120. Pt currently resting in bed with even, unlabored respirations. Pt able to and encouraged to inform staff of any needs.

## 2024-07-13 NOTE — TREATMENT TEAM
07/13/24 0555   Vital Signs   Blood Pressure (!) 182/78   BP Location Left leg   BP Method Manual     Pts BP remains elevated. Pt remains anxious and paranoid with staff following PRN Ativan 1 mg. PRN appears ineffective. Consulting provider made aware. Provider advised this nurse to continue to monitor patient as patient gets AM BP medications. Pt appears to be responding to VH. Pt incontinent of urine and assisted with arturo care. Post void bladder scan 176 mL. Pt assisted back to bed where she remains with 1:1 staff.

## 2024-07-13 NOTE — H&P
[facility]  H&P  Name: Meli Nowak 59 y.o. female I MRN: [id number]  Unit/Bed#: 7T [facility] 702-01 I Date of Admission: 7/13/2024   Date of Service: 7/13/2024 I Hospital Day: 0      Assessment & Plan   * Schizoaffective disorder, bipolar type (HCC)  Assessment & Plan  History of reactive airway disease diabetes hypertension and mood disorder transferred from [facility] for medical optimization.  7/2/2024: Admitted to [facility].  7/3/2024: Transferred to medical service for concerns of NMS.  Transferred to [facility] for formal neurology evaluation.  MRI negative.  LP without evidence of infection.  Paraneoplastic panel negative.  7/11/2024: Transferred back to [facility] but has had no oral intake and refusing to take medications.  Reviewed outpatient records.  Previously on buspirone 10 mg twice daily, alprazolam XR 2 mg in the morning and 1 mg at bedtime, clozapine 200 mg in the morning and 300 mg at bedtime, paroxetine 60 mg daily  Once NG tube is in place, will need to restart psychiatric medications in hopes to snap the patient out of her acute psychosis/catatonic state    Essential (primary) hypertension  Assessment & Plan  Elevated blood pressures due to refusal to take pills.  Was recently started on amlodipine.  Will start clonidine patch    Esophageal reflux  Assessment & Plan  Prior to admission on famotidine    Hypernatremia  Assessment & Plan  Hypernatremia secondary to free water deficit.  Will place NG tube and start tube feeds/free water    Results from last 7 days   Lab Units 07/13/24  0859 07/12/24  0513 07/10/24  0539 07/09/24  0807   SODIUM mmol/L 147 148* 146 143       Reactive airway disease  Assessment & Plan  Prior to admission on Arnuity Ellipta.  No shortness of breath    Hyperlipidemia  Assessment & Plan  Prior to admission atorvastatin    Type 2 diabetes mellitus (HCC)  Assessment & Plan  Lab Results   Component Value Date    HGBA1C 6.4 (H) 05/03/2024     Recent Labs      07/12/24  2102 07/13/24  0345 07/13/24  0736 07/13/24  1127   POCGLU 93 99 112 101     Prior to admission on metformin.  Will be placed on sliding scale insulin during medical hospitalization    VTE Pharmacologic Prophylaxis:   Moderate Risk (Score 3-4) - Pharmacological DVT Prophylaxis Ordered: enoxaparin (Lovenox).  Code Status: Level 1 - Full Code  Discussion with family: Updated  (daughter) via phone.    Anticipated Length of Stay: Patient will be admitted on an inpatient basis with an anticipated length of stay of greater than 2 midnights secondary to free water deficit, psychosis.    Total Time Spent on Date of Encounter in care of patient: This time was spent on one or more of the following: performing physical exam; counseling and coordination of care; obtaining or reviewing history; documenting in the medical record; reviewing/ordering tests, medications or procedures; communicating with other healthcare professionals and discussing with patient's family/caregivers.    Chief Complaint:     Poor intake and refusing to take medications    History of Present Illness:    Meli Nowak is a 59 y.o. female with a past medical history of mood disorder diabetes hypertension hyperlipidemia and reactive airway disease who is being transferred from [facility] to medical service.  The patient was initially admitted to [facility] on 7/2.  Due to concerns for NMS/reaction she was transferred to medical service on 7/3.  She was transferred to [facility] for formal neurology evaluation underwent LP and MRI brain unrevealing.  She was transferred back to psychiatric service where she remains nonverbal refusing to participate and refusing medications.  Due to concerns for free water deficit elevated blood pressures and malnutrition she is being admitted back to medical service for optimization.  On exam she cannot provide any history.  Plan of care was discussed with daughter on telephone.    Review of Systems:  Review  of Systems   Constitutional:  Positive for diaphoresis. Negative for fever.   HENT:  Negative for nosebleeds.    Eyes:  Negative for visual disturbance.   Respiratory:  Negative for shortness of breath.    Cardiovascular:  Negative for chest pain and palpitations.   Gastrointestinal:  Negative for diarrhea, nausea and vomiting.   Genitourinary:  Negative for hematuria.   Musculoskeletal:  Negative for back pain.   Skin:  Negative for rash.   Neurological:  Negative for seizures.   Psychiatric/Behavioral:  Positive for agitation, behavioral problems and dysphoric mood. The patient is nervous/anxious.    All other systems reviewed and are negative.        Past Medical and Surgical History:   Past Medical History:   Diagnosis Date    Cognitive impairment     Diabetes mellitus (HCC)     Essential (primary) hypertension     Psychosis (HCC)      Past Surgical History:   Procedure Laterality Date     SECTION      CHOLECYSTECTOMY       Meds/Allergies:  Allergies: No Known Allergies  Cannot display prior to admission medications because the patient has not been admitted in this contact.     Social History:     Social History     Socioeconomic History    Marital status: Single     Spouse name: Not on file    Number of children: Not on file    Years of education: Not on file    Highest education level: Not on file   Occupational History    Not on file   Tobacco Use    Smoking status: Every Day     Current packs/day: 0.50     Types: Cigarettes    Smokeless tobacco: Never   Vaping Use    Vaping status: Never Used   Substance and Sexual Activity    Alcohol use: Never     Comment: Unable to determine    Drug use: No    Sexual activity: Not on file   Other Topics Concern    Not on file   Social History Narrative    Not on file     Social Determinants of Health     Financial Resource Strain: Patient Unable To Answer (7/3/2024)    Overall Financial Resource Strain (CARDIA)     Difficulty of Paying Living Expenses: Patient  unable to answer   Food Insecurity: No Food Insecurity (7/12/2024)    Hunger Vital Sign     Worried About Running Out of Food in the Last Year: Never true     Ran Out of Food in the Last Year: Never true   Transportation Needs: No Transportation Needs (7/12/2024)    PRAPARE - Transportation     Lack of Transportation (Medical): No     Lack of Transportation (Non-Medical): No   Physical Activity: Not on file   Stress: Not on file   Social Connections: Not on file   Intimate Partner Violence: Not At Risk (7/12/2024)    Humiliation, Afraid, Rape, and Kick questionnaire     Fear of Current or Ex-Partner: No     Emotionally Abused: No     Physically Abused: No     Sexually Abused: No   Housing Stability: Low Risk  (7/12/2024)    Housing Stability Vital Sign     Unable to Pay for Housing in the Last Year: No     Number of Times Moved in the Last Year: 0     Homeless in the Last Year: No     Patient Pre-hospital Living Situation:   Patient Pre-hospital Level of Mobility:   Patient Pre-hospital Diet Restrictions:     Family History:  History reviewed. No pertinent family history.  Physical Exam:   Vitals:        Physical Exam  Vitals reviewed.   Constitutional:       General: She is awake.      Comments: Refusing to speak   HENT:      Head: Atraumatic.   Eyes:      General: No scleral icterus.     Extraocular Movements: Extraocular movements intact.   Cardiovascular:      Rate and Rhythm: Regular rhythm.      Heart sounds: Normal heart sounds.   Pulmonary:      Breath sounds: Decreased breath sounds present. No wheezing.   Abdominal:      General: Bowel sounds are normal.      Palpations: Abdomen is soft.      Tenderness: There is no abdominal tenderness. There is no guarding.   Musculoskeletal:         General: No swelling.      Cervical back: Neck supple.   Skin:     General: Skin is warm.   Neurological:      Comments: Withdraws from pain all extremities, nonverbal   Psychiatric:         Mood and Affect: Affect is flat.          Speech: She is noncommunicative. Speech is delayed.         Behavior: Behavior is slowed and withdrawn.       Lab Results: I have personally reviewed pertinent reports.    Results from last 7 days   Lab Units 07/13/24  0859   WBC Thousand/uL 10.92*   HEMOGLOBIN g/dL 14.0   HEMATOCRIT % 46.6*   PLATELETS Thousands/uL 303   SEGS PCT % 62   LYMPHO PCT % 24   MONO PCT % 12   EOS PCT % 0     Results from last 7 days   Lab Units 07/13/24  0859 07/12/24  0513 07/10/24  0539 07/09/24  0807 07/08/24  0546 07/07/24  0517   SODIUM mmol/L 147 148* 146 143 143 143   POTASSIUM mmol/L 3.9 3.3* 3.2* 3.5 3.2* 3.3*   CHLORIDE mmol/L 109* 109* 108 109* 107 107   CO2 mmol/L 26 26 30 30 32 31   ANION GAP mmol/L 12 13 8 4 4 5   BUN mg/dL 26* 26* 24 24 21 23   CREATININE mg/dL 0.81 0.75 0.73 0.75 0.67 0.80   CALCIUM mg/dL 9.7 9.2 8.8 8.2* 8.7 8.5   ALBUMIN g/dL  --  3.7  --   --   --   --    TOTAL BILIRUBIN mg/dL  --  0.64  --   --   --   --    ALK PHOS U/L  --  78  --   --   --   --    ALT U/L  --  16  --   --   --   --    AST U/L  --  21  --   --   --   --    EGFR ml/min/1.73sq m 80 88 91 88 97 82   GLUCOSE RANDOM mg/dL 89 72 73 84 88 93         Results from last 7 days   Lab Units 07/12/24  0513 07/08/24  0546 07/07/24  0838   CK TOTAL U/L 109 283* 287*                                      Lines/Drains  Invasive Devices       Peripheral Intravenous Line  Duration             Peripheral IV 07/12/24 Right;Dorsal (posterior) Hand 1 day                    Imaging: I have personally reviewed pertinent films in PACS  MRI brain w wo contrast  Result Date: 7/9/2024  Impression: No acute intracranial pathology. Mild chronic microangiopathy. Workstation performed: [id number]     Echo complete w/ contrast if indicated  Result Date: 7/8/2024  Narrative:   Left Ventricle: Left ventricular cavity size is normal. Wall thickness is increased. There is moderate to severe asymmetric septal hypertrophy. The left ventricular ejection fraction is  65%. Systolic function is vigorous. Wall motion is normal. Diastolic function is mildly abnormal, consistent with grade I (abnormal) relaxation. There is no LV dynamic obstruction at rest.  There is evidence of mild ALEXIS without septal contact.   Right Ventricle: Right ventricular cavity size is normal. Systolic function is normal. Normal tricuspid annular plane systolic excursion (TAPSE) > 1.7 cm.   Left Atrium: The atrium is mildly dilated.   Mitral Valve: There is mild annular calcification. There is mild regurgitation.   Pericardium: There is a trivial pericardial effusion.     CT chest abdomen pelvis wo contrast  Result Date: 7/3/2024  Impression: No acute findings in the chest, abdomen or pelvis within the limits of unenhanced technique. Resident: VERA Thomas I, the attending radiologist, have reviewed the images and agree with the final report above. Workstation performed: [id number]     CT head without contrast  Result Date: 7/1/2024  Impression: No acute intracranial abnormality. Workstation performed: [id number]        EKG, Pathology, and Other Studies Reviewed on Admission:       ** Please Note: This note has been constructed using a voice recognition system. **

## 2024-07-13 NOTE — DISCHARGE SUMMARY
"Discharge Summary - Behavioral Health   Meli Nowak 59 y.o. female MRN: [id number]  Unit/Bed#: [facility] 655-02 Encounter: [id number]     Admission Date:   Admission Orders (From admission, onward)       Ordered        07/11/24 1629  ED TO DIFFERENT CAMPUS [facility] UNIT or INPATIENT MEDICAL UNIT to [facility] UNIT (using Discharge Readmit Navigator) - Admit Patient to IP Behavioral Health Unit  Once                                Discharge Date: 07/13/24     Attending Psychiatrist: Dereje Banuelos MD    Reason for Admission/HPI:   History of Present Illness as per Dereje Banuelos MD on 7/12/24:  \"Meli Nowak is a 59 y.o.  female, w/ PMH of DM, rhabdomyolysis, HSV-2 infection, GERD, endometrial cancer, arthritis, urinary incontinence, and PPH of Schizoaffective disorder, PTSD, JOSEPH, panic disorder w/ agoraphobia, previously on Clozaril 200/300  mg, Xanax XR 1 mg and Paxil 30 mg BID and BuSpar as per chart review who initially presented to the ED on 6/29/24 due to paranoia and then on 7/1/24 was BIB [facility] to the ED due to paranoia and disorganized behavior. The patient initially admitted to the inpatient psychiatry unit  on 303 status but transferred to medical unit the same day due to AMS in presence of NICHOLAS w/ concern for catatonia vs. NMS. The patient underwent LP to r/o encephalitis and further w/u including Brain MRI were unremarkable for acute changes, started on Ativan which uptitrated to 1 mg IV q4h, and then transferred back to  for further psychiatric stabilization.      The patient was visited on the unit; chart reviewed. Presented calm but guarded and uncooperative with evaluation, with staring gaze at times, suckling her finger, and reportedly has not been eating / drinking or taking po meds. JAMES visited the patient on the unit for dehydration, hypokalemia, hypernatremia and hypertension (BP: 170/69 this morning) and the patient received IV bolus and will closely monitor the patient w/ daily weight and " "I/O check. Given the non-compliance with medications and treatment, the patient was also visited by Dr. Liang for the second opinion and will require MOO. The patient was placed on 1:1 for safety and fall risk. Started on Ativan 1 mg po/IV q4h for catatonia; doses to be adjusted as indicated, and also started on Prozac Liquid 20 mg daily. May benefit from restarting Clozaril once the catatonia and medical conditions improves vs. Court ordered ECT, if indicated.\"    Hospital Course:   The patient was admitted to the inpatient psychiatric unit and started on every 15 minutes precautions.  Upon arrival to the unit, the patient was not cooperative, very sedated, not eating food or drinking fluid, and was non-compliant with po meds. The patient was visited by Dr. Liang for second opinion for medication over objection. The patient was restarted on Ativan 1 mg po/IV q4h with minimal response. The patient had elevated BP with poor response to the scheduled meds and electrolyte abnormalities and dehydration due to poor po intake. SLIM actively followed the patient and the case was discussed with SLIM. The BP was high this morning, and I personally checked the BP with proper cuff size manually which was 190/90 at 9:30 AM. JAMES was contacted and the SLIM attending provider Dr. Flores visited the patient and the patient is being transferred to medical unit for further medical w/u and stabilization. Psychiatry consult team will f/u with the patient while the patient is on the medical floor, and the patient could return to inpatient psychiatry once medically stable and accepting food/po meds.     Mental Status at time of Discharge:   Appearance and attitude: appeared as stated age, uncooperative, dressed in hospital attire, with fair hygiene, diaphoretic with tachypnea  Eye contact: poor  Motor Function: PMR  Gait/station: Not observed  Speech:  mute  Language: Unable to assess due to patient factors  Mood/affect: dysphoric / " Affect was constricted  Thought Processes: patient is non-verbal  Thought content:  unable to fully assess due to patient's factors  Associations: patient is non-verbal  Perceptual disturbances: appears preoccupied  Orientation: not cooperative  Cognitive Function: confused  Memory: grossly impaired; not cooperative with formal MMSE  Intellect: unable to assess  Fund of knowledge: unable to assess due to patient factors  Impulse control: poor  Insight/judgment: impaired/impaired    Pain : unable to assess        Discharge Diagnosis:   Principal Problem:    Schizoaffective disorder, bipolar type ([facility])  Active Problems:    Medical clearance for psychiatric admission    Type 2 diabetes mellitus ([facility])    Obesity    Hypernatremia    Agoraphobia with panic disorder    Posttraumatic stress disorder    Catatonia    Hypokalemia      Medical Problems       Resolved Problems  Date Reviewed: 7/13/2024   None             Discharge Medications:  See after visit summary for reconciled discharge medications provided to patient and family.      Discharge instructions/Information to patient and family:   See after visit summary for information provided to patient and family.      Provisions for Follow-Up Care:  See after visit summary for information related to follow-up care and any pertinent home health orders.      Discharge Statement   I spent 30 minutes discharging the patient. This time was spent on the day of discharge. I had direct contact with the patient on the day of discharge. Additional documentation is required if more than 30 minutes were spent on discharge.

## 2024-07-14 LAB
ALBUMIN SERPL BCG-MCNC: 3.7 G/DL (ref 3.5–5)
ALP SERPL-CCNC: 76 U/L (ref 34–104)
ALT SERPL W P-5'-P-CCNC: 20 U/L (ref 7–52)
ANION GAP SERPL CALCULATED.3IONS-SCNC: 11 MMOL/L (ref 4–13)
ANION GAP SERPL CALCULATED.3IONS-SCNC: 12 MMOL/L (ref 4–13)
AST SERPL W P-5'-P-CCNC: 23 U/L (ref 13–39)
ATRIAL RATE: 114 BPM
BILIRUB SERPL-MCNC: 0.81 MG/DL (ref 0.2–1)
BUN SERPL-MCNC: 22 MG/DL (ref 5–25)
BUN SERPL-MCNC: 23 MG/DL (ref 5–25)
CALCIUM SERPL-MCNC: 9.3 MG/DL (ref 8.4–10.2)
CALCIUM SERPL-MCNC: 9.6 MG/DL (ref 8.4–10.2)
CHLORIDE SERPL-SCNC: 109 MMOL/L (ref 96–108)
CHLORIDE SERPL-SCNC: 110 MMOL/L (ref 96–108)
CK SERPL-CCNC: 104 U/L (ref 26–192)
CO2 SERPL-SCNC: 28 MMOL/L (ref 21–32)
CO2 SERPL-SCNC: 29 MMOL/L (ref 21–32)
CREAT SERPL-MCNC: 0.81 MG/DL (ref 0.6–1.3)
CREAT SERPL-MCNC: 0.92 MG/DL (ref 0.6–1.3)
CRP SERPL QL: 9.8 MG/L
ERYTHROCYTE [DISTWIDTH] IN BLOOD BY AUTOMATED COUNT: 17.1 % (ref 11.6–15.1)
GFR SERPL CREATININE-BSD FRML MDRD: 69 ML/MIN/1.73SQ M
GFR SERPL CREATININE-BSD FRML MDRD: 80 ML/MIN/1.73SQ M
GLUCOSE SERPL-MCNC: 103 MG/DL (ref 65–140)
GLUCOSE SERPL-MCNC: 107 MG/DL (ref 65–140)
GLUCOSE SERPL-MCNC: 114 MG/DL (ref 65–140)
GLUCOSE SERPL-MCNC: 185 MG/DL (ref 65–140)
GLUCOSE SERPL-MCNC: 94 MG/DL (ref 65–140)
GLUCOSE SERPL-MCNC: 94 MG/DL (ref 65–140)
HCT VFR BLD AUTO: 41.6 % (ref 34.8–46.1)
HGB BLD-MCNC: 13.4 G/DL (ref 11.5–15.4)
MCH RBC QN AUTO: 28.9 PG (ref 26.8–34.3)
MCHC RBC AUTO-ENTMCNC: 32.2 G/DL (ref 31.4–37.4)
MCV RBC AUTO: 90 FL (ref 82–98)
P AXIS: 44 DEGREES
PLATELET # BLD AUTO: 292 THOUSANDS/UL (ref 149–390)
PMV BLD AUTO: 10.6 FL (ref 8.9–12.7)
POTASSIUM SERPL-SCNC: 3 MMOL/L (ref 3.5–5.3)
POTASSIUM SERPL-SCNC: 3.7 MMOL/L (ref 3.5–5.3)
PR INTERVAL: 152 MS
PROT SERPL-MCNC: 6.4 G/DL (ref 6.4–8.4)
QRS AXIS: 18 DEGREES
QRSD INTERVAL: 90 MS
QT INTERVAL: 326 MS
QTC INTERVAL: 449 MS
RBC # BLD AUTO: 4.63 MILLION/UL (ref 3.81–5.12)
SODIUM SERPL-SCNC: 149 MMOL/L (ref 135–147)
SODIUM SERPL-SCNC: 150 MMOL/L (ref 135–147)
T WAVE AXIS: 170 DEGREES
VENTRICULAR RATE: 114 BPM
WBC # BLD AUTO: 8.47 THOUSAND/UL (ref 4.31–10.16)

## 2024-07-14 PROCEDURE — 85027 COMPLETE CBC AUTOMATED: CPT | Performed by: INTERNAL MEDICINE

## 2024-07-14 PROCEDURE — RECHECK: Performed by: INTERNAL MEDICINE

## 2024-07-14 PROCEDURE — 82948 REAGENT STRIP/BLOOD GLUCOSE: CPT

## 2024-07-14 PROCEDURE — 80053 COMPREHEN METABOLIC PANEL: CPT | Performed by: INTERNAL MEDICINE

## 2024-07-14 PROCEDURE — 82550 ASSAY OF CK (CPK): CPT | Performed by: INTERNAL MEDICINE

## 2024-07-14 PROCEDURE — 99232 SBSQ HOSP IP/OBS MODERATE 35: CPT | Performed by: INTERNAL MEDICINE

## 2024-07-14 PROCEDURE — 86140 C-REACTIVE PROTEIN: CPT | Performed by: INTERNAL MEDICINE

## 2024-07-14 PROCEDURE — 93010 ELECTROCARDIOGRAM REPORT: CPT | Performed by: INTERNAL MEDICINE

## 2024-07-14 PROCEDURE — 80048 BASIC METABOLIC PNL TOTAL CA: CPT | Performed by: INTERNAL MEDICINE

## 2024-07-14 PROCEDURE — 99223 1ST HOSP IP/OBS HIGH 75: CPT | Performed by: PSYCHIATRY & NEUROLOGY

## 2024-07-14 RX ORDER — POTASSIUM CHLORIDE, DEXTROSE MONOHYDRATE 150; 5 MG/100ML; G/100ML
75 INJECTION, SOLUTION INTRAVENOUS CONTINUOUS
Status: DISCONTINUED | OUTPATIENT
Start: 2024-07-14 | End: 2024-07-14 | Stop reason: SDUPTHER

## 2024-07-14 RX ORDER — FAMOTIDINE 40 MG/5ML
20 POWDER, FOR SUSPENSION ORAL 2 TIMES DAILY
Status: DISCONTINUED | OUTPATIENT
Start: 2024-07-14 | End: 2024-07-23 | Stop reason: HOSPADM

## 2024-07-14 RX ORDER — FLUOXETINE 20 MG/5ML
20 SOLUTION ORAL DAILY
Status: DISCONTINUED | OUTPATIENT
Start: 2024-07-15 | End: 2024-07-23 | Stop reason: HOSPADM

## 2024-07-14 RX ORDER — CALCIUM CARB/VITAMIN D3/VIT K1 500-500-40
15 TABLET,CHEWABLE ORAL DAILY
Status: DISCONTINUED | OUTPATIENT
Start: 2024-07-15 | End: 2024-07-23 | Stop reason: HOSPADM

## 2024-07-14 RX ORDER — DOCUSATE SODIUM 100 MG/1
100 CAPSULE, LIQUID FILLED ORAL 2 TIMES DAILY
Status: DISCONTINUED | OUTPATIENT
Start: 2024-07-14 | End: 2024-07-23 | Stop reason: HOSPADM

## 2024-07-14 RX ORDER — CARVEDILOL 6.25 MG/1
6.25 TABLET ORAL 2 TIMES DAILY WITH MEALS
Status: DISCONTINUED | OUTPATIENT
Start: 2024-07-14 | End: 2024-07-16

## 2024-07-14 RX ORDER — POTASSIUM CHLORIDE 20MEQ/15ML
40 LIQUID (ML) ORAL ONCE
Status: COMPLETED | OUTPATIENT
Start: 2024-07-14 | End: 2024-07-14

## 2024-07-14 RX ADMIN — FLUOXETINE 20 MG: 20 SOLUTION ORAL at 08:05

## 2024-07-14 RX ADMIN — Medication 15 ML: at 08:04

## 2024-07-14 RX ADMIN — POTASSIUM CHLORIDE 40 MEQ: 1.5 SOLUTION ORAL at 10:17

## 2024-07-14 RX ADMIN — INSULIN LISPRO 1 UNITS: 100 INJECTION, SOLUTION INTRAVENOUS; SUBCUTANEOUS at 11:32

## 2024-07-14 RX ADMIN — ENOXAPARIN SODIUM 40 MG: 40 INJECTION SUBCUTANEOUS at 08:03

## 2024-07-14 RX ADMIN — LORAZEPAM 1 MG: 2 INJECTION INTRAMUSCULAR; INTRAVENOUS at 17:25

## 2024-07-14 RX ADMIN — FLUTICASONE FUROATE 1 PUFF: 100 POWDER RESPIRATORY (INHALATION) at 08:06

## 2024-07-14 RX ADMIN — POTASSIUM CHLORIDE: 2 INJECTION, SOLUTION, CONCENTRATE INTRAVENOUS at 16:32

## 2024-07-14 RX ADMIN — LORAZEPAM 1 MG: 2 INJECTION INTRAMUSCULAR; INTRAVENOUS at 08:03

## 2024-07-14 RX ADMIN — CYANOCOBALAMIN TAB 1000 MCG 1000 MCG: 1000 TAB at 08:03

## 2024-07-14 RX ADMIN — LORAZEPAM 1 MG: 2 INJECTION INTRAMUSCULAR; INTRAVENOUS at 11:27

## 2024-07-14 RX ADMIN — HYDRALAZINE HYDROCHLORIDE 10 MG: 20 INJECTION, SOLUTION INTRAMUSCULAR; INTRAVENOUS at 16:41

## 2024-07-14 RX ADMIN — HYDRALAZINE HYDROCHLORIDE 10 MG: 20 INJECTION, SOLUTION INTRAMUSCULAR; INTRAVENOUS at 17:24

## 2024-07-14 RX ADMIN — FAMOTIDINE 20 MG: 40 POWDER, FOR SUSPENSION ORAL at 17:24

## 2024-07-14 RX ADMIN — CARVEDILOL 6.25 MG: 6.25 TABLET, FILM COATED ORAL at 16:43

## 2024-07-14 RX ADMIN — DOCUSATE SODIUM 100 MG: 100 CAPSULE, LIQUID FILLED ORAL at 09:09

## 2024-07-14 RX ADMIN — LORAZEPAM 1 MG: 2 INJECTION INTRAMUSCULAR; INTRAVENOUS at 22:33

## 2024-07-14 RX ADMIN — CLOZAPINE 25 MG: 25 TABLET ORAL at 22:34

## 2024-07-14 RX ADMIN — NICOTINE 1 PATCH: 7 PATCH, EXTENDED RELEASE TRANSDERMAL at 08:05

## 2024-07-14 NOTE — PROGRESS NOTES
[facility]  Progress Note  Name: Meli Nowak I  MRN: [id number]  Unit/Bed#: 7T [facility] 702-01 I Date of Admission: 7/13/2024   Date of Service: 7/14/2024 I Hospital Day: 1    Assessment & Plan   * Schizoaffective disorder, bipolar type (HCC)  Assessment & Plan  History of reactive airway disease diabetes hypertension and mood disorder transferred from [facility] for medical optimization.  7/2/2024: Admitted to [facility].  7/3/2024: Transferred to medical service for concerns of NMS.  Transferred to [facility] for formal neurology evaluation.  MRI negative.  LP without evidence of infection.  Paraneoplastic panel negative.  7/11/2024: Transferred back to [facility] but has had no oral intake and refusing to take medications.  Reviewed outpatient records.  Previously on buspirone 10 mg twice daily, alprazolam XR 2 mg in the morning and 1 mg at bedtime, clozapine 200 mg in the morning and 300 mg at bedtime, paroxetine 60 mg daily  Initial plan was transferred to medical service for placement of NG tube for feeds and to restart psychiatric medications in hopes to snap the patient out of her acute psychosis/catatonic state but she is now having better oral intake and eating solids  If no consistent oral intake or continued refusal to take medications, NG tube will be placed    Hypernatremia  Assessment & Plan  Hypernatremia secondary to free water deficit.    Initially plan was to transfer to medical service for placement of NG tube and start tube feeds/free water  When she arrived to the medical floor she was drinking fluids  Will monitor todays intake.  If not adequate or if refusing to take medications will place NG tube    Results from last 7 days   Lab Units 07/14/24  0447 07/13/24  0859 07/12/24  0513 07/10/24  0539   SODIUM mmol/L 149* 147 148* 146       Essential (primary) hypertension  Assessment & Plan  Elevated blood pressures due to refusal to take pills.  Was recently started on  amlodipine.  Started clonidine patch yesterday.  Monitor vitals    Hypokalemia  Assessment & Plan  Will replace    Results from last 7 days   Lab Units 07/14/24  0447 07/13/24  0859 07/12/24  0513 07/10/24  0539   POTASSIUM mmol/L 3.0* 3.9 3.3* 3.2*       Esophageal reflux  Assessment & Plan  Prior to admission on famotidine.  Will restart    Reactive airway disease  Assessment & Plan  Continue with prior to admission on Arnuity Ellipta.  No shortness of breath    Hyperlipidemia  Assessment & Plan  Prior to admission atorvastatin    Type 2 diabetes mellitus (HCC)  Assessment & Plan  Lab Results   Component Value Date    HGBA1C 6.4 (H) 05/03/2024     Recent Labs     07/13/24  1127 07/13/24  1559 07/13/24  2057 07/14/24  0606   POCGLU 101 101 121 103     Prior to admission on metformin.  Placed on sliding scale insulin during medical hospitalization    VTE Pharmacologic Prophylaxis: VTE Score: 4 Moderate Risk (Score 3-4) - Pharmacological DVT Prophylaxis Ordered: enoxaparin (Lovenox).    Mobility:  Basic Mobility Inpatient Raw Score: 9  JH-HLM Goal: 3: Sit at edge of bed  JH-HLM Achieved: 3: Sit at edge of bed  JH-HLM Goal achieved. Continue to encourage appropriate mobility.    Patient Centered Rounds: I have performed bedside rounds with nursing staff today.  Discussions with Specialists or Other Care Team Provider:     Education and Discussions with Family / Patient: Updated  (daughter) via phone.    Time Spent for Care:   This time was spent on one or more of the following: performing physical exam; counseling and coordination of care; obtaining or reviewing history; documenting in the medical record; reviewing/ordering tests, medications or procedures; communicating with other healthcare professionals and discussing with patient's family/caregivers.    Current Length of Stay: 1 day(s)  Current Patient Status: Inpatient   Certification Statement: The patient will continue to require additional  "inpatient hospital stay due to psychiatric and electrolyte management  Discharge Plan: Anticipate discharge in >72 hrs to inpatient psych.    Code Status: Level 1 - Full Code      Subjective:   Patient seen and examined.  When she arrived to the floor yesterday afternoon she was more awake and took pills/liquids.  No other events noted.  Ate a banana and drank juice with water this morning.    Objective:   Vitals: Blood pressure 165/84, pulse 96, temperature 97.7 °F (36.5 °C), temperature source Temporal, resp. rate 18, height 5' 7\" (1.702 m), weight 109 kg (240 lb 8.4 oz), SpO2 94%.    Intake/Output Summary (Last 24 hours) at 7/14/2024 1000  Last data filed at 7/13/2024 2130  Gross per 24 hour   Intake 970 ml   Output --   Net 970 ml       Physical Exam  Vitals reviewed.   Constitutional:       General: She is not in acute distress.     Appearance: Normal appearance. She is obese.   HENT:      Head: Atraumatic.   Cardiovascular:      Rate and Rhythm: Regular rhythm.   Pulmonary:      Breath sounds: Decreased breath sounds present. No wheezing.   Abdominal:      General: Bowel sounds are normal.      Palpations: Abdomen is soft.      Tenderness: There is no guarding.   Musculoskeletal:         General: No swelling.   Skin:     General: Skin is warm.   Neurological:      Mental Status: She is alert.      Comments: Nonverbal   Psychiatric:         Mood and Affect: Affect is flat.       Additional Data:   Labs:  Results from last 7 days   Lab Units 07/14/24  0447 07/13/24  0859 07/12/24  0513   WBC Thousand/uL 8.47 10.92* 8.97   HEMOGLOBIN g/dL 13.4 14.0 13.0   PLATELETS Thousands/uL 292 303 268   MCV fL 90 94 92     Results from last 7 days   Lab Units 07/14/24  0447 07/13/24  0859 07/12/24  0513   SODIUM mmol/L 149* 147 148*   POTASSIUM mmol/L 3.0* 3.9 3.3*   CHLORIDE mmol/L 110* 109* 109*   CO2 mmol/L 28 26 26   ANION GAP mmol/L 11 12 13   BUN mg/dL 23 26* 26*   CREATININE mg/dL 0.81 0.81 0.75   CALCIUM mg/dL 9.3 " 9.7 9.2   ALBUMIN g/dL 3.7  --  3.7   TOTAL BILIRUBIN mg/dL 0.81  --  0.64   ALK PHOS U/L 76  --  78   ALT U/L 20  --  16   AST U/L 23  --  21   EGFR ml/min/1.73sq m 80 80 88   GLUCOSE RANDOM mg/dL 94 89 72     Results from last 7 days   Lab Units 07/13/24  0859 07/12/24  0513   MAGNESIUM mg/dL 2.0 2.2   PHOSPHORUS mg/dL 3.5 3.7     Results from last 7 days   Lab Units 07/14/24  0447 07/12/24  0513 07/08/24  0546   CK TOTAL U/L 104 109 283*                  Results from last 7 days   Lab Units 07/14/24  0606 07/13/24  2057 07/13/24  1559 07/13/24  1127 07/13/24  0736 07/13/24  0345 07/12/24  2102 07/12/24  1546 07/12/24  1125 07/12/24  0803 07/12/24  0510 07/11/24  2129   POC GLUCOSE mg/dl 103 121 101 101 112 99 93 109 88 89 82 84         Results from last 7 days   Lab Units 07/12/24  0513   TSH 3RD GENERATON uIU/mL 1.543     * I Have Reviewed All Lab Data Listed Above.    Recent cultures:                   Lines/Drains:  Invasive Devices       Peripheral Intravenous Line  Duration             Peripheral IV 07/13/24 Left;Dorsal (posterior) Hand <1 day                          Telemetry:      Imaging:  Imaging Reports Reviewed Today Include:   No results found.    Scheduled Meds:  Current Facility-Administered Medications   Medication Dose Route Frequency Provider Last Rate    acetaminophen  650 mg Per NG Tube Q4H PRN Travon Flores, DO      cloNIDine  0.1 mg Transdermal Weekly Travon Flores, DO      cloZAPine  25 mg Oral HS Travon Flores, DO      cyanocobalamin  1,000 mcg Per NG Tube Daily Travon Flores, DO      docusate sodium  100 mg Oral BID Jordan C Holter, DO      enoxaparin  40 mg Subcutaneous Daily Travon Flores, DO      FLUoxetine  20 mg Per NG Tube Daily Travon Flores, DO      fluticasone  1 puff Inhalation Daily Travon Flores, DO      hydrALAZINE  10 mg Intravenous Q4H PRN Travon Flores, DO      insulin lispro  1-5 Units Subcutaneous 4x Daily (AC & HS) Travon Flores, DO      LORazepam  1 mg Oral 4x Daily  Travon Flores DO      Or    LORazepam  1 mg Intravenous 4x Daily Travon Flores DO      Multivitamin  15 mL Per NG Tube Daily Travon Flores DO      nicotine  1 patch Transdermal Daily Travon Flores DO      ondansetron  4 mg Intravenous Q4H PRN Travon Flores DO         Today, Patient Was Seen By: Travon Flores DO    ** Please Note: Dictation voice to text software may have been used in the creation of this document. **

## 2024-07-14 NOTE — ASSESSMENT & PLAN NOTE
Will replace    Results from last 7 days   Lab Units 07/14/24  0447 07/13/24  0859 07/12/24  0513 07/10/24  0539   POTASSIUM mmol/L 3.0* 3.9 3.3* 3.2*

## 2024-07-14 NOTE — CONSULTS
PSYCHIATRY CONSULTATION NOTE    Meli Nowak 59 y.o. female MRN: [id number]  Unit/Bed#: 7T [facility] 702-01 Encounter: [id number]     Assessment & Plan     Assessment     Meli Nowak is a 59 y.o. female, with a past psychiatric history of schizoaffective disorder who originally came to [facility] adult behavioral health unit on 7/3/2024 for worsenig psychosis.  Secondary to medical complications the patient has now been transferred up to the medical floor twice since first arriving to [facility].  Treatment for presumed catatonia was started during patient's first admission to the medical floor.  On readmission to the behavioral health unit, Ativan for catatonia was minimally effective.  Patient began to refuse oral intake and p.o. meds leading to dehydration, electrolyte abnormalities, and further psychiatric decompensation.  Patient was readmitted to the medical floor for the second time which is where she is currently.  Psychiatry was consulted in order to follow patient while she has been eating treated on the medical floor for the second time.  Patient's clozapine was restarted last night and patient has started to slowly resume taking medications, food, and fluids per mouth.  At this time, the plan is to readmit patient to behavioral health unit for further psychiatric stabilization once patient has been medically worked up and further stay stabilized.  The psychiatry consult team will continue to follow patient while she is on the medical floor.       Principal Problem:    Schizoaffective disorder, bipolar type (HCC)  Active Problems:    Type 2 diabetes mellitus (HCC)    Hyperlipidemia    Reactive airway disease    Hypernatremia    Esophageal reflux    Essential (primary) hypertension      Plan     Treatment Recommendations     Discussed plan with primary team as follows:  Hospital labs reviewed.  Collaborate with collaterals for baseline assessment and disposition as indicated.  Per previous discussions with  psychiatry team and medical team, patient will return to [facility] older adult behavioral unit once medically cleared.  Continue current psychiatric medication regimen.    While patient is currently on medical floor, psychiatry consult team will continue to follow patient and make medication adjustment recommendations as needed/indicated  Continue medical management per primary team.  Observation level: In-person 1:1 continual observation    Risks, benefits and possible side effects of Medications:   Is unable to communicate understanding secondary to current nonverbal state.     History of Present Illness      Provider Requesting Consult: Dr. Travon Flores DO  Reason for Consult / Principal Problem: Jessie Nowak is a 59 y.o. female, with history of schizoaffective dx, who initially presented to [facility] adult behavioral health unit on 7/3/2024  for worsening psychotic symptoms in the form of increased paranoid delusions and bizarre behavior.  Patient was transferred up to [facility] medical unit on the same day she was admitted to U for altered mental status in the setting of an NICHOLAS in order to rule out catatonia versus NMS.  Encephalopathy and acute intracranial abnormalities/changes were ruled out with brain MRI and LP while patient was on medical floor.  Patient was started on Ativan for presumed catatonia and then transferred back to older adult behavioral health unit on 7/11/2024.  At that time,the patient was refusing medications and p.o. oral intake.  Medications over objection was established.  Patient was restarted on Ativan at 1 mg every 4 hours.  Patient showed little to no response to Ativan.  Patient continued to have elevated BP as well as electrolyte abnormalities and dehydration.  The decision was made to transfer patient back to the medical floor for additional workup and stabilization on 7/13/2024.  The possibility of an NG tube was  "suggested in order to ensure proper p.o. oral intake as well as administration of necessary psychiatric medications not available in IM form.  However, last night patient began to take in small amounts of fluids, food, medication.  Patient was restarted on Clozapine 25 mg last night. For further details for further details regarding patient's initial arrival and subsequent readmission to [facility] adult [facility] see HPI was written on 7/3 and 7/12 recreated below.       Per H&P by Dr. Elliot Fitzgerald DO  on 7/3/2024:   \"Meli Nowak is a 59 y.o.  female, with an unknown living situation, with a PPHx of agoraphobia, schizoaffective disorder per chart review, and PMHx of type 2 diabetes who presented to [facility] due to paranoia. Patient was admitted to the Behavioral Health Unit on a involuntary 303 commitment basis due to signs of acute psychosis, psychotic symptoms, paranoid ideation, and odd behavior.     Symptoms prior to admission included disorganized behavior, disorganized thinking process, poor self-care, and change in mental status. Onset of symptoms was abrupt starting a few days ago with rapidly worsening course since that time.      On initial evaluation after admission to the inpatient psychiatric unit, Meli displayed a flat affect, was disheveled with poor grooming, nonverbal, disorganized, responding to internal stimuli, internally preoccupied, paranoid, diaphoretic, and displaying bizarre movements including shuffling gait and rigidness in extremities.  Patient would not respond to any questioning.  She was difficult to redirect and appeared to be paranoid of this note writer.  Patient was walking without purpose and reaching for things on the wall.  She required multiple attempts at redirection to sit in his seat which patient only did briefly before getting up and beginning to walk away  without answering questions.  Per chart review patient was found by PD sitting on a park bench " "appearing \"very paranoid and making no sense.\"  Patient in emergency department was also not answering questions and just kept repeatedly saying \"I am scared.\"  Per chart review patient had been prescribed clozapine and it is unknown if she had been taking it.  Clozapine level on admission was 134.      After attempting to interview patient case was discussed with medical team.  Patient displayed concerns for catatonia versus NMS versus cholinergic rebound syndrome.  Further lab work demonstrated patient met SIRS criteria due to leukocytosis and tachycardia.  Patient was afebrile but also was hypertensive.  CRP and CK were elevated.  Patient also had an NICHOLAS.  Based on patient meeting SIRS criteria, having a new NICHOLAS, and having concerns for possible NMS versus catatonia versus cholinergic rebound it was determined that patient should be transferred to medical floor.\"    Per H&P by Dr. Dereje Banuelos MD on 7/12/2024:   \"Meli Nowak is a 59 y.o.  female, w/ PMH of DM, rhabdomyolysis, HSV-2 infection, GERD, endometrial cancer, arthritis, urinary incontinence, and PPH of Schizoaffective disorder, PTSD, JOSEPH, panic disorder w/ agoraphobia, previously on Clozaril 200/300  mg, Xanax XR 1 mg and Paxil 30 mg BID and BuSpar as per chart review who initially presented to the ED on 6/29/24 due to paranoia and then on 7/1/24 was BIB [facility] to the ED due to paranoia and disorganized behavior. The patient initially admitted to the inpatient psychiatry unit  on 303 status but transferred to medical unit the same day due to AMS in presence of NICHOLAS w/ concern for catatonia vs. NMS. The patient underwent LP to r/o encephalitis and further w/u including Brain MRI were unremarkable for acute changes, started on Ativan which uptitrated to 1 mg IV q4h, and then transferred back to 6B for further psychiatric stabilization.      The patient was visited on the unit; chart reviewed. Presented calm but guarded and uncooperative with " "evaluation, with staring gaze at times, suckling her finger, and reportedly has not been eating / drinking or taking po meds. JAMES visited the patient on the unit for dehydration, hypokalemia, hypernatremia and hypertension (BP: 170/69 this morning) and the patient received IV bolus and will closely monitor the patient w/ daily weight and I/O check. Given the non-compliance with medications and treatment, the patient was also visited by Dr. Liang for the second opinion and will require MOO. The patient was placed on 1:1 for safety and fall risk. Started on Ativan 1 mg po/IV q4h for catatonia; doses to be adjusted as indicated, and also started on Prozac Liquid 20 mg daily. May benefit from restarting Clozaril once the catatonia and medical conditions improves vs. Court ordered ECT, if indicated. \"    When patient was readmitted to the medical floor yesterday, 7/13/2024, the psychiatry consult team was contacted in order to follow patient while she is being medically worked up and stabilized.  This writer attempted to speak with patient twice today.  Patient was unable to speak to this writer.  Patient appeared to hear this writer, but it is unclear whether or not she comprehended this writer.  Nursing was able to get patient to eat parts of her meal, drink some fluid, and take her p.o. medications this morning, which suggests some form of comprehension.    Secondary to this patient's inability to speak with or respond to this writer, the majority of the information in this note except for certain aspects of the MSE were pulled from patient's most recent H&P on 7/12/2024.      Medical Review Of Systems:  Unable to assess secondary to patient's current nonverbal state.       Psychiatric Review of Systems   ROS was not able to be completed today secondary to patient's current nonverbal state.  For most recent psychiatric ROS information, please see Dr. Banuelos's HPI written on 7/12/2024 that has been recreated " above.     Sleep: Unable to obtain   Loss of Interest/Anhedonia: Unable to obtain   Guilt/hopeless: Unable to obtain   Low energy/anergy: Unable to obtain   Poor Concentration: Unable to obtain   Appetite changes: Unable to obtain   Weight changes: Unable to obtain   Somatic symptoms: Unable to obtain   Anxiety/panic: Unable to obtain   Brianne: Unable to obtain   Paranoia: Unable to obtain   Delusions: Unable to obtain   SI: Unable to obtain   HI: Unable to obtain   AH: Unable to obtain   VH: Unable to obtain   Self Injurious behavior: Unable to obtain   OCD: Unable to obtain   Eating Disorder: Unable to obtain       Historical Information      Past Psychiatric History:   Past Psychiatric diagnoses    Per record review, schizoaffective disorder  Past Inpatient Psychiatric management:   Unable to obtain  Past Outpatient Psychiatric management:   Unable to obtain  Past Medication trials:   Per chart review - Clozaril, Xanax, Paxil, BuSpar  Past Suicide attempts:   Unable to obtain  History of non-suicidal self injury:   Unable to obtain  Past Violent behavior:   Unable to obtain      Substance Abuse History:  I am unable to assess the patient for substance use within the past 12 months as they are unable or unwilling to answer   Information below is gathered from chart review.  Alcohol use: No  Recreational drug use:   Cocaine:  No  Heroin:  No  Marijuana:  No  Other drugs: No  Smoking history: Per chart review - 0.5 packs/day  Use of caffeine: Unknown    Family Psychiatric History:   Psychiatric Illness: Unable to obtain  Substance Abuse: Unable to obtain  Suicide Attempts: Unable to obtain    Social History:  Education: Unknown   Marital history: Unknown  Children: Unknown  Living Arrangement: Unknown  Access to firearms: Unknown  Occupational History: Unknown  Functioning Relationships: Unknown.        Traumatic History:   Abuse: Per chart review - history of PTSD  Other Traumatic Events: Unknown    Past Medical  History:   Diagnosis Date    Cognitive impairment     Diabetes mellitus (HCC)     Essential (primary) hypertension     Psychosis (HCC)        Meds/Allergies   all current active meds have been reviewed  No Known Allergies      Objective      Vital signs in last 24 hours:  Temp:  [96.9 °F (36.1 °C)-99.3 °F (37.4 °C)] 97.7 °F (36.5 °C)  HR:  [] 96  Resp:  [16-22] 18  BP: (158-185)/(80-98) 165/84    Intake/Output Summary (Last 24 hours) at 7/14/2024 0934  Last data filed at 7/13/2024 2130  Gross per 24 hour   Intake 970 ml   Output --   Net 970 ml       Mental Status Evaluation:    Appearance:  Female, alert, disheveled, poor grooming/hygiene, wearing disposable underwear and pads, wearing hospital attire, darker facial hair on lip   Behavior:  Childlike behavior, not able to cooperate, tracks this writer   Speech:  Nonverbal   Mood:  Unable to assess   Affect:  blunted   Thought Process:  Unable to assess   Associations: Unable to assess   Thought Content:  Unable to assess   Perceptual Disturbances: Unable to assess   Risk Potential: Suicidal ideation -unable to assess.  Remains on 1: 1 observation  Homicidal ideation -unable to assess  Potential for aggression -remains on 1: 1 observation.  No agitation or aggression observed during evaluation   Sensorium:  Unable to assess.  Appears disoriented   Memory:  Unable to assess   Consciousness:  alert and awake   Attention/Concentration: Unable to assess   Intellect: Unable to assess   Fund of Knowledge: Unable to assess   Insight:  Unable to assess   Judgment: Unable to accurately assess, however increased p.o. intake suggests an improvement in judgment   Gait/Station: in bed   Motor Activity: Poor posture while sitting in bed, puts hands to face occasionally in a nonpurposeful way       ACTIVE MEDICATIONS     Current Medications:  Current Facility-Administered Medications   Medication Dose Route Frequency Provider Last Rate    acetaminophen  650 mg Per NG Tube  Q4H PRN Travon Mark, DO      cloNIDine  0.1 mg Transdermal Weekly Travon Flores, DO      cloZAPine  25 mg Oral HS Travon Haywoodng, DO      cyanocobalamin  1,000 mcg Per NG Tube Daily Travon Flores, DO      docusate sodium  100 mg Oral BID Gerardo Songter, DO      enoxaparin  40 mg Subcutaneous Daily Travon Haywoodng, DO      FLUoxetine  20 mg Per NG Tube Daily Travon Flores, DO      fluticasone  1 puff Inhalation Daily Travon Flores, DO      hydrALAZINE  10 mg Intravenous Q4H PRN Travon Mark, DO      insulin lispro  1-5 Units Subcutaneous 4x Daily (AC & HS) Travon Flores, DO      LORazepam  1 mg Oral 4x Daily Travon Flores, DO      Or    LORazepam  1 mg Intravenous 4x Daily Travon Flores, DO      Multivitamin  15 mL Per NG Tube Daily Travon Flores, DO      nicotine  1 patch Transdermal Daily Travon Flores, DO      ondansetron  4 mg Intravenous Q4H PRN Travon Flores, DO         Behavioral Health Medications: I have personally reviewed all current active medications. Changes as in plan section above.      ADDITIONAL DATA     Code Status:     EKG Results: I have personally reviewed pertinent reports.  QTc= 454 on 7/12/2024    Radiology Results: I have personally reviewed pertinent reports. I have personally reviewed pertinent films in PACS.  No results found.  No Chest XR results available for this patient.     Laboratory Results: I have personally reviewed all pertinent laboratory/tests results.  Recent Results (from the past 48 hour(s))   Fingerstick Glucose (POCT)    Collection Time: 07/12/24 11:25 AM   Result Value Ref Range    POC Glucose 88 65 - 140 mg/dl   ECG 12 lead    Collection Time: 07/12/24  2:52 PM   Result Value Ref Range    Ventricular Rate 86 BPM    Atrial Rate 86 BPM    WA Interval 162 ms    QRSD Interval 92 ms    QT Interval 374 ms    QTC Interval 447 ms    P Axis 38 degrees    QRS Axis 17 degrees    T Wave Axis 82 degrees   ECG 12 lead    Collection Time: 07/12/24  2:53 PM   Result Value Ref Range     Ventricular Rate 87 BPM    Atrial Rate 87 BPM    SD Interval 154 ms    QRSD Interval 98 ms    QT Interval 378 ms    QTC Interval 454 ms    P Axis 39 degrees    QRS Axis 20 degrees    T Wave Axis 76 degrees   Fingerstick Glucose (POCT)    Collection Time: 07/12/24  3:46 PM   Result Value Ref Range    POC Glucose 109 65 - 140 mg/dl   Fingerstick Glucose (POCT)    Collection Time: 07/12/24  9:02 PM   Result Value Ref Range    POC Glucose 93 65 - 140 mg/dl   Fingerstick Glucose (POCT)    Collection Time: 07/13/24  3:45 AM   Result Value Ref Range    POC Glucose 99 65 - 140 mg/dl   Fingerstick Glucose (POCT)    Collection Time: 07/13/24  7:36 AM   Result Value Ref Range    POC Glucose 112 65 - 140 mg/dl   Basic metabolic panel    Collection Time: 07/13/24  8:59 AM   Result Value Ref Range    Sodium 147 135 - 147 mmol/L    Potassium 3.9 3.5 - 5.3 mmol/L    Chloride 109 (H) 96 - 108 mmol/L    CO2 26 21 - 32 mmol/L    ANION GAP 12 4 - 13 mmol/L    BUN 26 (H) 5 - 25 mg/dL    Creatinine 0.81 0.60 - 1.30 mg/dL    Glucose 89 65 - 140 mg/dL    Glucose, Fasting 89 65 - 99 mg/dL    Calcium 9.7 8.4 - 10.2 mg/dL    eGFR 80 ml/min/1.73sq m   Magnesium    Collection Time: 07/13/24  8:59 AM   Result Value Ref Range    Magnesium 2.0 1.9 - 2.7 mg/dL   Phosphorus    Collection Time: 07/13/24  8:59 AM   Result Value Ref Range    Phosphorus 3.5 2.7 - 4.5 mg/dL   CBC and differential    Collection Time: 07/13/24  8:59 AM   Result Value Ref Range    WBC 10.92 (H) 4.31 - 10.16 Thousand/uL    RBC 4.97 3.81 - 5.12 Million/uL    Hemoglobin 14.0 11.5 - 15.4 g/dL    Hematocrit 46.6 (H) 34.8 - 46.1 %    MCV 94 82 - 98 fL    MCH 28.2 26.8 - 34.3 pg    MCHC 30.0 (L) 31.4 - 37.4 g/dL    RDW 16.8 (H) 11.6 - 15.1 %    MPV 9.9 8.9 - 12.7 fL    Platelets 303 149 - 390 Thousands/uL    nRBC 0 /100 WBCs    Segmented % 62 43 - 75 %    Immature Grans % 1 0 - 2 %    Lymphocytes % 24 14 - 44 %    Monocytes % 12 4 - 12 %    Eosinophils Relative 0 0 - 6 %     Basophils Relative 1 0 - 1 %    Absolute Neutrophils 6.94 1.85 - 7.62 Thousands/µL    Absolute Immature Grans 0.06 0.00 - 0.20 Thousand/uL    Absolute Lymphocytes 2.58 0.60 - 4.47 Thousands/µL    Absolute Monocytes 1.27 (H) 0.17 - 1.22 Thousand/µL    Eosinophils Absolute 0.00 0.00 - 0.61 Thousand/µL    Basophils Absolute 0.07 0.00 - 0.10 Thousands/µL   Fingerstick Glucose (POCT)    Collection Time: 07/13/24 11:27 AM   Result Value Ref Range    POC Glucose 101 65 - 140 mg/dl   Fingerstick Glucose (POCT)    Collection Time: 07/13/24  3:59 PM   Result Value Ref Range    POC Glucose 101 65 - 140 mg/dl   Fingerstick Glucose (POCT)    Collection Time: 07/13/24  8:57 PM   Result Value Ref Range    POC Glucose 121 65 - 140 mg/dl   Comprehensive metabolic panel    Collection Time: 07/14/24  4:47 AM   Result Value Ref Range    Sodium 149 (H) 135 - 147 mmol/L    Potassium 3.0 (L) 3.5 - 5.3 mmol/L    Chloride 110 (H) 96 - 108 mmol/L    CO2 28 21 - 32 mmol/L    ANION GAP 11 4 - 13 mmol/L    BUN 23 5 - 25 mg/dL    Creatinine 0.81 0.60 - 1.30 mg/dL    Glucose 94 65 - 140 mg/dL    Calcium 9.3 8.4 - 10.2 mg/dL    AST 23 13 - 39 U/L    ALT 20 7 - 52 U/L    Alkaline Phosphatase 76 34 - 104 U/L    Total Protein 6.4 6.4 - 8.4 g/dL    Albumin 3.7 3.5 - 5.0 g/dL    Total Bilirubin 0.81 0.20 - 1.00 mg/dL    eGFR 80 ml/min/1.73sq m   CBC (With Platelets)    Collection Time: 07/14/24  4:47 AM   Result Value Ref Range    WBC 8.47 4.31 - 10.16 Thousand/uL    RBC 4.63 3.81 - 5.12 Million/uL    Hemoglobin 13.4 11.5 - 15.4 g/dL    Hematocrit 41.6 34.8 - 46.1 %    MCV 90 82 - 98 fL    MCH 28.9 26.8 - 34.3 pg    MCHC 32.2 31.4 - 37.4 g/dL    RDW 17.1 (H) 11.6 - 15.1 %    Platelets 292 149 - 390 Thousands/uL    MPV 10.6 8.9 - 12.7 fL   C-reactive protein    Collection Time: 07/14/24  4:47 AM   Result Value Ref Range    CRP 9.8 (H) <3.0 mg/L   CK    Collection Time: 07/14/24  4:47 AM   Result Value Ref Range    Total  26 - 192 U/L   Fingerstick  Glucose (POCT)    Collection Time: 07/14/24  6:06 AM   Result Value Ref Range    POC Glucose 103 65 - 140 mg/dl          This note was not shared with the patient due to reasonable likelihood of causing patient harm      This note has been constructed in part using a voice recognition system.   There may be translation, syntax,  or grammatical errors. If you have any questions, please contact the dictating provider.    Alaina Prather DO  Psychiatry resident, PGY-II  Department of Psychiatry and Behavioral Health  [facility]

## 2024-07-14 NOTE — ASSESSMENT & PLAN NOTE
Elevated blood pressures due to refusal to take pills.  Was recently started on amlodipine.  Started clonidine patch yesterday.  Monitor vitals

## 2024-07-14 NOTE — PROGRESS NOTES
Treatment plan    Patient able to successfully take all her morning medications orally.  Also drinking fluids and eating a banana.  Defer on NG tube placement at this time.  Recheck labs at 1400.  Will decide on IV fluids at that time.    Travon Flores, DO FACP

## 2024-07-14 NOTE — PLAN OF CARE
Problem: METABOLIC, FLUID AND ELECTROLYTES - ADULT  Goal: Glucose maintained within target range  Description: INTERVENTIONS:  - Monitor Blood Glucose as ordered  - Assess for signs and symptoms of hyperglycemia and hypoglycemia  - Administer ordered medications to maintain glucose within target range  - Assess nutritional intake and initiate nutrition service referral as needed  Outcome: Progressing     Problem: METABOLIC, FLUID AND ELECTROLYTES - ADULT  Goal: Electrolytes maintained within normal limits  Description: INTERVENTIONS:  - Monitor labs and assess patient for signs and symptoms of electrolyte imbalances  - Administer electrolyte replacement as ordered  - Monitor response to electrolyte replacements, including repeat lab results as appropriate  - Instruct patient on fluid and nutrition as appropriate  Outcome: Progressing     Problem: Nutrition/Hydration-ADULT  Goal: Nutrient/Hydration intake appropriate for improving, restoring or maintaining nutritional needs  Description: Monitor and assess patient's nutrition/hydration status for malnutrition. Collaborate with interdisciplinary team and initiate plan and interventions as ordered.  Monitor patient's weight and dietary intake as ordered or per policy. Utilize nutrition screening tool and intervene as necessary. Determine patient's food preferences and provide high-protein, high-caloric foods as appropriate.     INTERVENTIONS:  - Monitor oral intake, urinary output, labs, and treatment plans  - Assess nutrition and hydration status and recommend course of action  - Evaluate amount of meals eaten  - Assist patient with eating if necessary   - Allow adequate time for meals  - Recommend/ encourage appropriate diets, oral nutritional supplements, and vitamin/mineral supplements  - Order, calculate, and assess calorie counts as needed  - Recommend, monitor, and adjust tube feedings and TPN/PPN based on assessed needs  - Assess need for intravenous  fluids  - Provide specific nutrition/hydration education as appropriate  - Include patient/family/caregiver in decisions related to nutrition  Outcome: Progressing     Problem: Prexisting or High Potential for Compromised Skin Integrity  Goal: Skin integrity is maintained or improved  Description: INTERVENTIONS:  - Identify patients at risk for skin breakdown  - Assess and monitor skin integrity  - Assess and monitor nutrition and hydration status  - Monitor labs   - Assess for incontinence   - Turn and reposition patient  - Assist with mobility/ambulation  - Relieve pressure over bony prominences  - Avoid friction and shearing  - Provide appropriate hygiene as needed including keeping skin clean and dry  - Evaluate need for skin moisturizer/barrier cream  - Collaborate with interdisciplinary team   - Patient/family teaching  - Consider wound care consult   Outcome: Progressing

## 2024-07-14 NOTE — ASSESSMENT & PLAN NOTE
Lab Results   Component Value Date    HGBA1C 6.4 (H) 05/03/2024     Recent Labs     07/13/24  1127 07/13/24  1559 07/13/24 2057 07/14/24  0606   POCGLU 101 101 121 103     Prior to admission on metformin.  Placed on sliding scale insulin during medical hospitalization

## 2024-07-14 NOTE — ASSESSMENT & PLAN NOTE
History of reactive airway disease diabetes hypertension and mood disorder transferred from [facility] for medical optimization.  7/2/2024: Admitted to [facility].  7/3/2024: Transferred to medical service for concerns of NMS.  Transferred to [facility] for formal neurology evaluation.  MRI negative.  LP without evidence of infection.  Paraneoplastic panel negative.  7/11/2024: Transferred back to [facility] but has had no oral intake and refusing to take medications.  Reviewed outpatient records.  Previously on buspirone 10 mg twice daily, alprazolam XR 2 mg in the morning and 1 mg at bedtime, clozapine 200 mg in the morning and 300 mg at bedtime, paroxetine 60 mg daily  Initial plan was transferred to medical service for placement of NG tube for feeds and to restart psychiatric medications in hopes to snap the patient out of her acute psychosis/catatonic state but she is now having better oral intake and eating solids  If no consistent oral intake or continued refusal to take medications, NG tube will be placed

## 2024-07-14 NOTE — ASSESSMENT & PLAN NOTE
Hypernatremia secondary to free water deficit.    Initially plan was to transfer to medical service for placement of NG tube and start tube feeds/free water  When she arrived to the medical floor she was drinking fluids  Will monitor todays intake.  If not adequate or if refusing to take medications will place NG tube    Results from last 7 days   Lab Units 07/14/24  0447 07/13/24  0859 07/12/24  0513 07/10/24  0539   SODIUM mmol/L 149* 147 148* 146

## 2024-07-14 NOTE — PROGRESS NOTES
Treatment plan    Patient repeat BMP demonstrates improved potassium but worsening hypernatremia concerning for free water deficit despite patient's attempt at oral intake.  Will start D5 + KCl.  Recheck labs in AM.    Results from last 7 days   Lab Units 07/14/24  1440 07/14/24  0447 07/13/24  0859   SODIUM mmol/L 150* 149* 147   POTASSIUM mmol/L 3.7 3.0* 3.9     Also, the patient's blood pressure remains elevated despite clonidine patch.  Will start carvedilol 6.25 mg twice daily.    Travon Flores, DO FACP

## 2024-07-14 NOTE — PLAN OF CARE
Problem: PAIN - ADULT  Goal: Verbalizes/displays adequate comfort level or baseline comfort level  Description: Interventions:  - Encourage patient to monitor pain and request assistance  - Assess pain using appropriate pain scale  - Administer analgesics based on type and severity of pain and evaluate response  - Implement non-pharmacological measures as appropriate and evaluate response  - Consider cultural and social influences on pain and pain management  - Notify physician/advanced practitioner if interventions unsuccessful or patient reports new pain  Outcome: Progressing     Problem: INFECTION - ADULT  Goal: Absence or prevention of progression during hospitalization  Description: INTERVENTIONS:  - Assess and monitor for signs and symptoms of infection  - Monitor lab/diagnostic results  - Monitor all insertion sites, i.e. indwelling lines, tubes, and drains  - Monitor endotracheal if appropriate and nasal secretions for changes in amount and color  - [place] appropriate cooling/warming therapies per order  - Administer medications as ordered  - Instruct and encourage patient and family to use good hand hygiene technique  - Identify and instruct in appropriate isolation precautions for identified infection/condition  Outcome: Progressing  Goal: Absence of fever/infection during neutropenic period  Description: INTERVENTIONS:  - Monitor WBC    Outcome: Progressing     Problem: SAFETY ADULT  Goal: Patient will remain free of falls  Description: INTERVENTIONS:  - Educate patient/family on patient safety including physical limitations  - Instruct patient to call for assistance with activity   - Consult OT/PT to assist with strengthening/mobility   - Keep Call bell within reach  - Keep bed low and locked with side rails adjusted as appropriate  - Keep care items and personal belongings within reach  - Initiate and maintain comfort rounds  - Make Fall Risk Sign visible to staff  - Offer Toileting every 2 Hours,  in advance of need  - Initiate/Maintain bed alarm  - Obtain necessary fall risk management equipment: bed alarm  - Apply yellow socks and bracelet for high fall risk patients  - Consider moving patient to room near nurses station  Outcome: Progressing  Goal: Maintain or return to baseline ADL function  Description: INTERVENTIONS:  -  Assess patient's ability to carry out ADLs; assess patient's baseline for ADL function and identify physical deficits which impact ability to perform ADLs (bathing, care of mouth/teeth, toileting, grooming, dressing, etc.)  - Assess/evaluate cause of self-care deficits   - Assess range of motion  - Assess patient's mobility; develop plan if impaired  - Assess patient's need for assistive devices and provide as appropriate  - Encourage maximum independence but intervene and supervise when necessary  - Involve family in performance of ADLs  - Assess for home care needs following discharge   - Consider OT consult to assist with ADL evaluation and planning for discharge  - Provide patient education as appropriate  Outcome: Progressing  Goal: Maintains/Returns to pre admission functional level  Description: INTERVENTIONS:  - Perform AM-PAC 6 Click Basic Mobility/ Daily Activity assessment daily.  - Set and communicate daily mobility goal to care team and patient/family/caregiver.   - Collaborate with rehabilitation services on mobility goals if consulted  - Perform Range of Motion 2 times a day.  - Reposition patient every 2 hours.  - Dangle patient 2 times a day  - Stand patient 2 times a day  - Ambulate patient 2 times a day  - Out of bed to chair 2 times a day   - Out of bed for meals 2 times a day  - Out of bed for toileting  - Record patient progress and toleration of activity level   Outcome: Progressing     Problem: DISCHARGE PLANNING  Goal: Discharge to home or other facility with appropriate resources  Description: INTERVENTIONS:  - Identify barriers to discharge w/patient and  caregiver  - Arrange for needed discharge resources and transportation as appropriate  - Identify discharge learning needs (meds, wound care, etc.)  - Arrange for interpretive services to assist at discharge as needed  - Refer to Case Management Department for coordinating discharge planning if the patient needs post-hospital services based on physician/advanced practitioner order or complex needs related to functional status, cognitive ability, or social support system  Outcome: Progressing     Problem: Knowledge Deficit  Goal: Patient/family/caregiver demonstrates understanding of disease process, treatment plan, medications, and discharge instructions  Description: Complete learning assessment and assess knowledge base.  Interventions:  - Provide teaching at level of understanding  - Provide teaching via preferred learning methods  Outcome: Progressing     Problem: Prexisting or High Potential for Compromised Skin Integrity  Goal: Skin integrity is maintained or improved  Description: INTERVENTIONS:  - Identify patients at risk for skin breakdown  - Assess and monitor skin integrity  - Assess and monitor nutrition and hydration status  - Monitor labs   - Assess for incontinence   - Turn and reposition patient  - Assist with mobility/ambulation  - Relieve pressure over bony prominences  - Avoid friction and shearing  - Provide appropriate hygiene as needed including keeping skin clean and dry  - Evaluate need for skin moisturizer/barrier cream  - Collaborate with interdisciplinary team   - Patient/family teaching  - Consider wound care consult   Outcome: Progressing     Problem: Nutrition/Hydration-ADULT  Goal: Nutrient/Hydration intake appropriate for improving, restoring or maintaining nutritional needs  Description: Monitor and assess patient's nutrition/hydration status for malnutrition. Collaborate with interdisciplinary team and initiate plan and interventions as ordered.  Monitor patient's weight and  dietary intake as ordered or per policy. Utilize nutrition screening tool and intervene as necessary. Determine patient's food preferences and provide high-protein, high-caloric foods as appropriate.     INTERVENTIONS:  - Monitor oral intake, urinary output, labs, and treatment plans  - Assess nutrition and hydration status and recommend course of action  - Evaluate amount of meals eaten  - Assist patient with eating if necessary   - Allow adequate time for meals  - Recommend/ encourage appropriate diets, oral nutritional supplements, and vitamin/mineral supplements  - Order, calculate, and assess calorie counts as needed  - Recommend, monitor, and adjust tube feedings and TPN/PPN based on assessed needs  - Assess need for intravenous fluids  - Provide specific nutrition/hydration education as appropriate  - Include patient/family/caregiver in decisions related to nutrition  Outcome: Progressing     Problem: METABOLIC, FLUID AND ELECTROLYTES - ADULT  Goal: Electrolytes maintained within normal limits  Description: INTERVENTIONS:  - Monitor labs and assess patient for signs and symptoms of electrolyte imbalances  - Administer electrolyte replacement as ordered  - Monitor response to electrolyte replacements, including repeat lab results as appropriate  - Instruct patient on fluid and nutrition as appropriate  Outcome: Progressing  Goal: Glucose maintained within target range  Description: INTERVENTIONS:  - Monitor Blood Glucose as ordered  - Assess for signs and symptoms of hyperglycemia and hypoglycemia  - Administer ordered medications to maintain glucose within target range  - Assess nutritional intake and initiate nutrition service referral as needed  Outcome: Progressing

## 2024-07-15 LAB
ALBUMIN SERPL BCG-MCNC: 3.5 G/DL (ref 3.5–5)
ALP SERPL-CCNC: 69 U/L (ref 34–104)
ALT SERPL W P-5'-P-CCNC: 19 U/L (ref 7–52)
ANION GAP SERPL CALCULATED.3IONS-SCNC: 9 MMOL/L (ref 4–13)
AST SERPL W P-5'-P-CCNC: 22 U/L (ref 13–39)
BASOPHILS # BLD AUTO: 0.11 THOUSANDS/ÂΜL (ref 0–0.1)
BASOPHILS NFR BLD AUTO: 1 % (ref 0–1)
BILIRUB SERPL-MCNC: 0.8 MG/DL (ref 0.2–1)
BUN SERPL-MCNC: 18 MG/DL (ref 5–25)
CALCIUM SERPL-MCNC: 9.1 MG/DL (ref 8.4–10.2)
CHLORIDE SERPL-SCNC: 110 MMOL/L (ref 96–108)
CK SERPL-CCNC: 85 U/L (ref 26–192)
CO2 SERPL-SCNC: 28 MMOL/L (ref 21–32)
CREAT SERPL-MCNC: 0.84 MG/DL (ref 0.6–1.3)
CRP SERPL QL: 6.6 MG/L
EOSINOPHIL # BLD AUTO: 0 THOUSAND/ÂΜL (ref 0–0.61)
EOSINOPHIL NFR BLD AUTO: 0 % (ref 0–6)
ERYTHROCYTE [DISTWIDTH] IN BLOOD BY AUTOMATED COUNT: 17.1 % (ref 11.6–15.1)
GFR SERPL CREATININE-BSD FRML MDRD: 77 ML/MIN/1.73SQ M
GLUCOSE SERPL-MCNC: 107 MG/DL (ref 65–140)
GLUCOSE SERPL-MCNC: 107 MG/DL (ref 65–140)
GLUCOSE SERPL-MCNC: 116 MG/DL (ref 65–140)
GLUCOSE SERPL-MCNC: 118 MG/DL (ref 65–140)
GLUCOSE SERPL-MCNC: 156 MG/DL (ref 65–140)
HCT VFR BLD AUTO: 43 % (ref 34.8–46.1)
HGB BLD-MCNC: 13 G/DL (ref 11.5–15.4)
IMM GRANULOCYTES # BLD AUTO: 0.03 THOUSAND/UL (ref 0–0.2)
IMM GRANULOCYTES NFR BLD AUTO: 0 % (ref 0–2)
LYMPHOCYTES # BLD AUTO: 2.3 THOUSANDS/ÂΜL (ref 0.6–4.47)
LYMPHOCYTES NFR BLD AUTO: 30 % (ref 14–44)
MAGNESIUM SERPL-MCNC: 2.1 MG/DL (ref 1.9–2.7)
MCH RBC QN AUTO: 28.9 PG (ref 26.8–34.3)
MCHC RBC AUTO-ENTMCNC: 30.2 G/DL (ref 31.4–37.4)
MCV RBC AUTO: 96 FL (ref 82–98)
MONOCYTES # BLD AUTO: 0.98 THOUSAND/ÂΜL (ref 0.17–1.22)
MONOCYTES NFR BLD AUTO: 13 % (ref 4–12)
NEUTROPHILS # BLD AUTO: 4.32 THOUSANDS/ÂΜL (ref 1.85–7.62)
NEUTS SEG NFR BLD AUTO: 56 % (ref 43–75)
NRBC BLD AUTO-RTO: 0 /100 WBCS
PHOSPHATE SERPL-MCNC: 4.2 MG/DL (ref 2.7–4.5)
PLATELET # BLD AUTO: 274 THOUSANDS/UL (ref 149–390)
PMV BLD AUTO: 10.3 FL (ref 8.9–12.7)
POTASSIUM SERPL-SCNC: 3.5 MMOL/L (ref 3.5–5.3)
PROT SERPL-MCNC: 6.1 G/DL (ref 6.4–8.4)
RBC # BLD AUTO: 4.5 MILLION/UL (ref 3.81–5.12)
SODIUM SERPL-SCNC: 147 MMOL/L (ref 135–147)
WBC # BLD AUTO: 7.74 THOUSAND/UL (ref 4.31–10.16)

## 2024-07-15 PROCEDURE — 99232 SBSQ HOSP IP/OBS MODERATE 35: CPT | Performed by: INTERNAL MEDICINE

## 2024-07-15 PROCEDURE — 83735 ASSAY OF MAGNESIUM: CPT | Performed by: INTERNAL MEDICINE

## 2024-07-15 PROCEDURE — 84100 ASSAY OF PHOSPHORUS: CPT | Performed by: INTERNAL MEDICINE

## 2024-07-15 PROCEDURE — 99232 SBSQ HOSP IP/OBS MODERATE 35: CPT | Performed by: PSYCHIATRY & NEUROLOGY

## 2024-07-15 PROCEDURE — 82948 REAGENT STRIP/BLOOD GLUCOSE: CPT

## 2024-07-15 PROCEDURE — 85025 COMPLETE CBC W/AUTO DIFF WBC: CPT | Performed by: INTERNAL MEDICINE

## 2024-07-15 PROCEDURE — 86140 C-REACTIVE PROTEIN: CPT | Performed by: INTERNAL MEDICINE

## 2024-07-15 PROCEDURE — 92610 EVALUATE SWALLOWING FUNCTION: CPT

## 2024-07-15 PROCEDURE — 82550 ASSAY OF CK (CPK): CPT | Performed by: INTERNAL MEDICINE

## 2024-07-15 PROCEDURE — 80053 COMPREHEN METABOLIC PANEL: CPT | Performed by: INTERNAL MEDICINE

## 2024-07-15 RX ORDER — ATORVASTATIN CALCIUM 10 MG/1
10 TABLET, FILM COATED ORAL DAILY
Status: DISCONTINUED | OUTPATIENT
Start: 2024-07-16 | End: 2024-07-23 | Stop reason: HOSPADM

## 2024-07-15 RX ADMIN — NICOTINE 1 PATCH: 7 PATCH, EXTENDED RELEASE TRANSDERMAL at 09:29

## 2024-07-15 RX ADMIN — FLUOXETINE 20 MG: 20 SOLUTION ORAL at 09:28

## 2024-07-15 RX ADMIN — LORAZEPAM 1 MG: 2 INJECTION INTRAMUSCULAR; INTRAVENOUS at 17:55

## 2024-07-15 RX ADMIN — POTASSIUM CHLORIDE: 2 INJECTION, SOLUTION, CONCENTRATE INTRAVENOUS at 06:25

## 2024-07-15 RX ADMIN — LORAZEPAM 1 MG: 2 INJECTION INTRAMUSCULAR; INTRAVENOUS at 23:22

## 2024-07-15 RX ADMIN — LORAZEPAM 1 MG: 2 INJECTION INTRAMUSCULAR; INTRAVENOUS at 09:25

## 2024-07-15 RX ADMIN — FAMOTIDINE 20 MG: 40 POWDER, FOR SUSPENSION ORAL at 09:27

## 2024-07-15 RX ADMIN — INSULIN LISPRO 1 UNITS: 100 INJECTION, SOLUTION INTRAVENOUS; SUBCUTANEOUS at 12:04

## 2024-07-15 RX ADMIN — POTASSIUM CHLORIDE 20 MEQ: 2 INJECTION, SOLUTION, CONCENTRATE INTRAVENOUS at 13:10

## 2024-07-15 RX ADMIN — CLOZAPINE 25 MG: 25 TABLET ORAL at 23:22

## 2024-07-15 RX ADMIN — CARVEDILOL 6.25 MG: 6.25 TABLET, FILM COATED ORAL at 17:58

## 2024-07-15 RX ADMIN — LORAZEPAM 1 MG: 2 INJECTION INTRAMUSCULAR; INTRAVENOUS at 13:10

## 2024-07-15 RX ADMIN — DOCUSATE SODIUM 100 MG: 100 CAPSULE, LIQUID FILLED ORAL at 17:58

## 2024-07-15 NOTE — ASSESSMENT & PLAN NOTE
History of reactive airway disease diabetes hypertension and mood disorder transferred from [facility] for medical optimization.  7/2/2024: Admitted to [facility].  7/3/2024: Transferred to medical service for concerns of NMS.  Transferred to [facility] for formal neurology evaluation.  MRI negative.  LP without evidence of infection.  Paraneoplastic panel negative.  7/11/2024: Transferred back to [facility] but has had no oral intake and refusing to take medications.  Reviewed outpatient records.  Previously on buspirone 10 mg twice daily, alprazolam XR 2 mg in the morning and 1 mg at bedtime, clozapine 200 mg in the morning and 300 mg at bedtime, paroxetine 60 mg daily  Initial plan was transferred to medical service for placement of NG tube for feeds and to restart psychiatric medications in hopes to snap the patient out of her acute psychosis/catatonic state but she is now having better oral intake and eating solids  Tolerates diet today, recommend DC fluids and transfer to psychiatry service

## 2024-07-15 NOTE — SPEECH THERAPY NOTE
Speech Pathology Bedside Swallow Evaluation:        Summary:  Pt seen for bedside swallow evaluation. Pt with minimal participation in session and kept eyes closed the majority of evaluation. Pt disoriented and unable to follow directions. Pt is currently on a Regular consistency/thin liquid diet. Per chart review, pt has had very limited PO intake. Pt observed with liquid AM meds (slightly thick consistency) with some oral holding and no overt s/s aspiration. Pt with minimal mouth opening. Pt observed with small sips of thin liquid via spoon with mild anterior loss and no overt s/s aspiration. Pt with limited verbal output t/o session. CXR from 7/4/24 showed no acute process.    Assessment currently limited by pt's alertness and ability to participate. Per notes, appetite/participation had been better yesterday and at that time pt was able to drink water and eat a banana without issue. Recommend continuing current diet. Hold PO if not alert. SLP to follow for ongoing assessment of swallowing.       SLP RECOMMENDATIONS:   Regular/thin liquid consistency   Aspiration precautions  Feeding assist   SLP to follow     Therapy Prognosis:guarded   Prognosis considerations: limited alertness   Frequency: 2-4x/week pending progress      Vitals:    07/14/24 0628 07/14/24 1524 07/14/24 2301 07/15/24 0751   BP: 165/84 (!) 177/85 162/81 170/91   BP Location: Right arm Right arm Right arm Right arm   Pulse: 96 97 96 93   Resp: 18 20 19 20   Temp: 97.7 °F (36.5 °C) 99.7 °F (37.6 °C) 98 °F (36.7 °C) 97.8 °F (36.6 °C)   TempSrc: Temporal Temporal Temporal Temporal   SpO2: 94% 95% 96% 97%   Weight:       Height:         Lab Results   Component Value Date    WBC 7.74 07/15/2024    HGB 13.0 07/15/2024    HCT 43.0 07/15/2024    MCV 96 07/15/2024     07/15/2024         Consider consult w/:  Nutrition    Goal(s):  Pt will tolerate least restrictive diet w/out s/s aspiration or oral/pharyngeal difficulties.     H&P/Admit info/  pertinent provider notes: (PMH noted above)  Per admission notes:   History of Present Illness:     Meli Nowak is a 59 y.o. female with a PMH including schizoaffective disorder, bipolar type, T2DM, obesity, and reactive airway disease who is originally admitted to the psychiatric service due to catatonia. We are consulted for medical clearance for psychiatric hospitalization and medical management. [facility] 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 She was transferred to  medical unit for further evaluation and treatment. She was maintain on Ativan for catatonia. Neurology was consulted and recommended LP and MRI brain for which patient was sent to [facility]. Workup thus far has been negative. Lyme and autoimmune are pending. Patient was medically stabilized and transferred back to [facility]. Currently, she is resting out of bed in a recliner chair on room air. She is non verbal. She follows minimal commands (track finger).         Special Studies:  CXR 7/4/24:  IMPRESSION:     No acute cardiopulmonary disease.        Previous VBS:  None     Patient's goal: none stated     Did the pt report pain? No   If yes, was nursing notified/was it addressed?    Reason for consult:  R/o aspiration  Determine safest and least restrictive diet      Precautions:  Aspiration      Food allergies:  No Known Allergies     Current diet:  Regular/thin    Premorbid diet:  Regular/thin    O2 requirements:  RA   Voice/Speech:  Limited    Social:  Needs assist    Follows commands:  Impaired    Cognitive status:  Impaired

## 2024-07-15 NOTE — PLAN OF CARE
Problem: PAIN - ADULT  Goal: Verbalizes/displays adequate comfort level or baseline comfort level  Description: Interventions:  - Encourage patient to monitor pain and request assistance  - Assess pain using appropriate pain scale  - Administer analgesics based on type and severity of pain and evaluate response  - Implement non-pharmacological measures as appropriate and evaluate response  - Consider cultural and social influences on pain and pain management  - Notify physician/advanced practitioner if interventions unsuccessful or patient reports new pain  Outcome: Progressing     Problem: INFECTION - ADULT  Goal: Absence or prevention of progression during hospitalization  Description: INTERVENTIONS:  - Assess and monitor for signs and symptoms of infection  - Monitor lab/diagnostic results  - Monitor all insertion sites, i.e. indwelling lines, tubes, and drains  - Monitor endotracheal if appropriate and nasal secretions for changes in amount and color  - [place] appropriate cooling/warming therapies per order  - Administer medications as ordered  - Instruct and encourage patient and family to use good hand hygiene technique  - Identify and instruct in appropriate isolation precautions for identified infection/condition  Outcome: Progressing  Goal: Absence of fever/infection during neutropenic period  Description: INTERVENTIONS:  - Monitor WBC    Outcome: Progressing     Problem: SAFETY ADULT  Goal: Patient will remain free of falls  Description: INTERVENTIONS:  - Educate patient/family on patient safety including physical limitations  - Instruct patient to call for assistance with activity   - Consult OT/PT to assist with strengthening/mobility   - Keep Call bell within reach  - Keep bed low and locked with side rails adjusted as appropriate  - Keep care items and personal belongings within reach  - Initiate and maintain comfort rounds  - Make Fall Risk Sign visible to staff  - Apply yellow socks and bracelet  for high fall risk patients  - Consider moving patient to room near nurses station  Outcome: Progressing  Goal: Maintain or return to baseline ADL function  Description: INTERVENTIONS:  -  Assess patient's ability to carry out ADLs; assess patient's baseline for ADL function and identify physical deficits which impact ability to perform ADLs (bathing, care of mouth/teeth, toileting, grooming, dressing, etc.)  - Assess/evaluate cause of self-care deficits   - Assess range of motion  - Assess patient's mobility; develop plan if impaired  - Assess patient's need for assistive devices and provide as appropriate  - Encourage maximum independence but intervene and supervise when necessary  - Involve family in performance of ADLs  - Assess for home care needs following discharge   - Consider OT consult to assist with ADL evaluation and planning for discharge  - Provide patient education as appropriate  Outcome: Progressing  Goal: Maintains/Returns to pre admission functional level  Description: INTERVENTIONS:  - Perform AM-PAC 6 Click Basic Mobility/ Daily Activity assessment daily.  - Set and communicate daily mobility goal to care team and patient/family/caregiver.   - Collaborate with rehabilitation services on mobility goals if consulted  - Out of bed for toileting  - Record patient progress and toleration of activity level   Outcome: Progressing     Problem: DISCHARGE PLANNING  Goal: Discharge to home or other facility with appropriate resources  Description: INTERVENTIONS:  - Identify barriers to discharge w/patient and caregiver  - Arrange for needed discharge resources and transportation as appropriate  - Identify discharge learning needs (meds, wound care, etc.)  - Arrange for interpretive services to assist at discharge as needed  - Refer to Case Management Department for coordinating discharge planning if the patient needs post-hospital services based on physician/advanced practitioner order or complex needs  related to functional status, cognitive ability, or social support system  Outcome: Progressing     Problem: Knowledge Deficit  Goal: Patient/family/caregiver demonstrates understanding of disease process, treatment plan, medications, and discharge instructions  Description: Complete learning assessment and assess knowledge base.  Interventions:  - Provide teaching at level of understanding  - Provide teaching via preferred learning methods  Outcome: Progressing     Problem: Prexisting or High Potential for Compromised Skin Integrity  Goal: Skin integrity is maintained or improved  Description: INTERVENTIONS:  - Identify patients at risk for skin breakdown  - Assess and monitor skin integrity  - Assess and monitor nutrition and hydration status  - Monitor labs   - Assess for incontinence   - Turn and reposition patient  - Assist with mobility/ambulation  - Relieve pressure over bony prominences  - Avoid friction and shearing  - Provide appropriate hygiene as needed including keeping skin clean and dry  - Evaluate need for skin moisturizer/barrier cream  - Collaborate with interdisciplinary team   - Patient/family teaching  - Consider wound care consult   Outcome: Progressing     Problem: Nutrition/Hydration-ADULT  Goal: Nutrient/Hydration intake appropriate for improving, restoring or maintaining nutritional needs  Description: Monitor and assess patient's nutrition/hydration status for malnutrition. Collaborate with interdisciplinary team and initiate plan and interventions as ordered.  Monitor patient's weight and dietary intake as ordered or per policy. Utilize nutrition screening tool and intervene as necessary. Determine patient's food preferences and provide high-protein, high-caloric foods as appropriate.     INTERVENTIONS:  - Monitor oral intake, urinary output, labs, and treatment plans  - Assess nutrition and hydration status and recommend course of action  - Evaluate amount of meals eaten  - Assist  patient with eating if necessary   - Allow adequate time for meals  - Recommend/ encourage appropriate diets, oral nutritional supplements, and vitamin/mineral supplements  - Order, calculate, and assess calorie counts as needed  - Recommend, monitor, and adjust tube feedings and TPN/PPN based on assessed needs  - Assess need for intravenous fluids  - Provide specific nutrition/hydration education as appropriate  - Include patient/family/caregiver in decisions related to nutrition  Outcome: Progressing     Problem: METABOLIC, FLUID AND ELECTROLYTES - ADULT  Goal: Electrolytes maintained within normal limits  Description: INTERVENTIONS:  - Monitor labs and assess patient for signs and symptoms of electrolyte imbalances  - Administer electrolyte replacement as ordered  - Monitor response to electrolyte replacements, including repeat lab results as appropriate  - Instruct patient on fluid and nutrition as appropriate  Outcome: Progressing  Goal: Glucose maintained within target range  Description: INTERVENTIONS:  - Monitor Blood Glucose as ordered  - Assess for signs and symptoms of hyperglycemia and hypoglycemia  - Administer ordered medications to maintain glucose within target range  - Assess nutritional intake and initiate nutrition service referral as needed  Outcome: Progressing     Problem: MOBILITY - ADULT  Goal: Maintain or return to baseline ADL function  Description: INTERVENTIONS:  -  Assess patient's ability to carry out ADLs; assess patient's baseline for ADL function and identify physical deficits which impact ability to perform ADLs (bathing, care of mouth/teeth, toileting, grooming, dressing, etc.)  - Assess/evaluate cause of self-care deficits   - Assess range of motion  - Assess patient's mobility; develop plan if impaired  - Assess patient's need for assistive devices and provide as appropriate  - Encourage maximum independence but intervene and supervise when necessary  - Involve family in  performance of ADLs  - Assess for home care needs following discharge   - Consider OT consult to assist with ADL evaluation and planning for discharge  - Provide patient education as appropriate  Outcome: Progressing  Goal: Maintains/Returns to pre admission functional level  Description: INTERVENTIONS:  - Perform AM-PAC 6 Click Basic Mobility/ Daily Activity assessment daily.  - Set and communicate daily mobility goal to care team and patient/family/caregiver.   - Collaborate with rehabilitation services on mobility goals if consulted  - Out of bed for toileting  - Record patient progress and toleration of activity level   Outcome: Progressing

## 2024-07-15 NOTE — PLAN OF CARE
Problem: INFECTION - ADULT  Goal: Absence or prevention of progression during hospitalization  Description: INTERVENTIONS:  - Assess and monitor for signs and symptoms of infection  - Monitor lab/diagnostic results  - Monitor all insertion sites, i.e. indwelling lines, tubes, and drains  - Monitor endotracheal if appropriate and nasal secretions for changes in amount and color  - [place] appropriate cooling/warming therapies per order  - Administer medications as ordered  - Instruct and encourage patient and family to use good hand hygiene technique  - Identify and instruct in appropriate isolation precautions for identified infection/condition  7/15/2024 0126 by Mona Killian  Outcome: Progressing  7/15/2024 0126 by Mona Killian  Outcome: Progressing  Goal: Absence of fever/infection during neutropenic period  Description: INTERVENTIONS:  - Monitor WBC    7/15/2024 0126 by Mona Killian  Outcome: Progressing  7/15/2024 0126 by Mona Killian  Outcome: Progressing     Problem: SAFETY ADULT  Goal: Patient will remain free of falls  Description: INTERVENTIONS:  - Educate patient/family on patient safety including physical limitations  - Instruct patient to call for assistance with activity   - Consult OT/PT to assist with strengthening/mobility   - Keep Call bell within reach  - Keep bed low and locked with side rails adjusted as appropriate  - Keep care items and personal belongings within reach  - Initiate and maintain comfort rounds  - Make Fall Risk Sign visible to staff  - Apply yellow socks and bracelet for high fall risk patients  - Consider moving patient to room near nurses station  7/15/2024 0126 by Mona Killian  Outcome: Progressing  7/15/2024 0126 by Mona Killian  Outcome: Progressing  Goal: Maintain or return to baseline ADL function  Description: INTERVENTIONS:  -  Assess patient's ability to carry out ADLs; assess patient's baseline for ADL function and identify physical deficits which impact  ability to perform ADLs (bathing, care of mouth/teeth, toileting, grooming, dressing, etc.)  - Assess/evaluate cause of self-care deficits   - Assess range of motion  - Assess patient's mobility; develop plan if impaired  - Assess patient's need for assistive devices and provide as appropriate  - Encourage maximum independence but intervene and supervise when necessary  - Involve family in performance of ADLs  - Assess for home care needs following discharge   - Consider OT consult to assist with ADL evaluation and planning for discharge  - Provide patient education as appropriate  7/15/2024 0126 by Mona Killian  Outcome: Progressing  7/15/2024 0126 by Mona Killian  Outcome: Progressing  Goal: Maintains/Returns to pre admission functional level  Description: INTERVENTIONS:  - Perform AM-PAC 6 Click Basic Mobility/ Daily Activity assessment daily.  - Set and communicate daily mobility goal to care team and patient/family/caregiver.   - Collaborate with rehabilitation services on mobility goals if consulted    Problem: DISCHARGE PLANNING  Goal: Discharge to home or other facility with appropriate resources  Description: INTERVENTIONS:  - Identify barriers to discharge w/patient and caregiver  - Arrange for needed discharge resources and transportation as appropriate  - Identify discharge learning needs (meds, wound care, etc.)  - Arrange for interpretive services to assist at discharge as needed  - Refer to Case Management Department for coordinating discharge planning if the patient needs post-hospital services based on physician/advanced practitioner order or complex needs related to functional status, cognitive ability, or social support system  7/15/2024 0126 by Mona Killian  Outcome: Progressing  7/15/2024 0126 by Mona Killian  Outcome: Progressing     Problem: Knowledge Deficit  Goal: Patient/family/caregiver demonstrates understanding of disease process, treatment plan, medications, and discharge  instructions  Description: Complete learning assessment and assess knowledge base.  Interventions:  - Provide teaching at level of understanding  - Provide teaching via preferred learning methods  7/15/2024 0126 by Mona Killian  Outcome: Progressing  7/15/2024 0126 by Mona Killian  Outcome: Progressing     Problem: MOBILITY - ADULT  Goal: Maintain or return to baseline ADL function  Description: INTERVENTIONS:  -  Assess patient's ability to carry out ADLs; assess patient's baseline for ADL function and identify physical deficits which impact ability to perform ADLs (bathing, care of mouth/teeth, toileting, grooming, dressing, etc.)  - Assess/evaluate cause of self-care deficits   - Assess range of motion  - Assess patient's mobility; develop plan if impaired  - Assess patient's need for assistive devices and provide as appropriate  - Encourage maximum independence but intervene and supervise when necessary  - Involve family in performance of ADLs  - Assess for home care needs following discharge   - Consider OT consult to assist with ADL evaluation and planning for discharge  - Provide patient education as appropriate  7/15/2024 0126 by Mona Killian  Outcome: Progressing  7/15/2024 0126 by Mona Killian  Outcome: Progressing  Goal: Maintains/Returns to pre admission functional level  Description: INTERVENTIONS:  - Perform AM-PAC 6 Click Basic Mobility/ Daily Activity assessment daily.  - Set and communicate daily mobility goal to care team and patient/family/caregiver.   - Collaborate with rehabilitation services on mobility goals if consulted  - Out of bed for toileting  - Record patient progress and toleration of activity level   7/15/2024 0126 by Mona Killian  Outcome: Progressing  7/15/2024 0126 by Mona Killian  Outcome: Progressing     Problem: METABOLIC, FLUID AND ELECTROLYTES - ADULT  Goal: Electrolytes maintained within normal limits  Description: INTERVENTIONS:  - Monitor labs and assess patient for  signs and symptoms of electrolyte imbalances  - Administer electrolyte replacement as ordered  - Monitor response to electrolyte replacements, including repeat lab results as appropriate  - Instruct patient on fluid and nutrition as appropriate  7/15/2024 0126 by Mona Killian  Outcome: Progressing  7/15/2024 0126 by Mona Killian  Outcome: Progressing  Goal: Glucose maintained within target range  Description: INTERVENTIONS:  - Monitor Blood Glucose as ordered  - Assess for signs and symptoms of hyperglycemia and hypoglycemia  - Administer ordered medications to maintain glucose within target range  - Assess nutritional intake and initiate nutrition service referral as needed  7/15/2024 0126 by Mona Killian  Outcome: Progressing  7/15/2024 0126 by Mona Killian  Outcome: Progressing   - Out of bed for toileting  - Record patient progress and toleration of activity level   7/15/2024 0126 by Mona Killian  Outcome: Progressing  7/15/2024 0126 by Mona Killian  Outcome: Progressing

## 2024-07-15 NOTE — PROGRESS NOTES
PSYCHIATRY CONSULT SERVICE  PROGRESS NOTE    Meli Nowak 59 y.o. female MRN: [id number]  Unit/Bed#: 7T [facility] 702-01 Encounter: [id number]     ASSESSMENT / PLAN     Meli Nowak is a 59 y.o. female, with past psychiatric history of schizoaffective disorder, who initially presented to [place] inpatient adult behavioral [facility] unit on 7/3/2024 for psychosis. Patient was transferred to the medical unit during her admission for AMS, NICHOLAS, r/o catatonia vs NMS due to abrupt discontinuation of Clozaril, transferred to [place] for further neurology workup to r/o meningitis/encephalitis with a negative workup. Patient was transferred back to [facility] on 7/11 and subsequently transferred back to the medical floor on 7/13 due to dehydration, electrolyte abnormalities, and elevated BP. On initial psychiatry consult, patient was in nonverbal state. Patient was restarted on clozapine 25 mg HS. She continues to remain nonverbal, eating minimal amounts of food, and intermittently refusing medications.     Principal Psychiatric Problem:  Schizoaffective Disorder, bipolar type (HCC)        Principal Problem:    Schizoaffective disorder, bipolar type (HCC)  Active Problems:    Type 2 diabetes mellitus (HCC)    Hyperlipidemia    Reactive airway disease    Hypernatremia    Esophageal reflux    Hypokalemia    Essential (primary) hypertension      RECOMMENDED TREATMENT     Discussed plan with primary team as follows:  Hospital labs reviewed.  Collaborate with collaterals for further assessment and disposition as indicated.  Patient will continue to be seen by Psychiatry team while on the medical floor. Will readmit to [facility] unit when medically cleared.  Recommend no changes to psychotropic medications at this time. Continue current regimen. Clozaril will be titrated slowly, plan to increase to Clozaril 50 mg on 7/16.  Continue medical management per primary  team.  Observation level: In-person 1:1 continual observation  Please reach out to on-call Psychiatry team via [employer], or if after hours/Fri/Sat/Sunday contact on-call psychiatric service via [employer] ([phone]) with any questions or concerns.         SUBJECTIVE     Patient was seen and evaluated for continuity of care. Meli closes her eyes upon my arrival and remains with eyes closed throughout interview. Patient appears restless and unwilling to speak to this writer. She is moving around in bed, placing hands over eyes, and intermittently waving arms in the air. When spoken to, she does not respond verbally. Per staff, patient has been randomly yelling throughout the morning, but unable to verbalize needs, not sleeping well, and did not eat her breakfast this morning. Additional information unable to obtain due to patient's lack of speech.      Psychiatric Review of Systems:  ROS: Review of systems could not be obtained due to patient factors.    OBJECTIVE     Vital signs in last 24 hours:  Temp:  [97.8 °F (36.6 °C)-99.7 °F (37.6 °C)] 97.8 °F (36.6 °C)  HR:  [93-97] 93  Resp:  [19-20] 20  BP: (162-177)/(81-91) 170/91    Mental Status Evaluation:  Appearance:  marginal hygiene, wearing hospital clothes, overweight, increased facial hair on chin, skin discoloration in spots on forehead   Behavior:  mute, eyes remain closed, somewhat restless in bed, intermittently covering eyes with hands and waving arms in the air   Speech:  mute   Mood:  Unable to assess   Affect:  blunted   Language: unable to assess   Thought Process:  unable to assess   Associations: unable to assess - does not answer   Thought Content:  Unable to assess   Perceptual Disturbances: Unable to assess   Risk Potential: Suicidal ideation - unable to assess  Homicidal ideation -  unable to assess  Potential for aggression - Not at present   Sensorium:  unable to assess, does not answer   Memory:  patient does not answer   Consciousness:   sedated   Attention/Concentration: Unable to assess   Intellect: unable to assess   Fund of Knowledge: Unable to assess   Insight:  Unable to assess   Judgment: Unable to assess   Muscle Strength Muscle Tone: unable to assess  Increased spasticity, cogwheel rigidity present in wrists bilaterally   Gait/Station: Moving all extremities, did not assess gait   Motor Activity: no abnormal movements           ACTIVE MEDICATIONS     Current Medications:  Current Facility-Administered Medications   Medication Dose Route Frequency Provider Last Rate    acetaminophen  650 mg Per NG Tube Q4H PRN Travon Flores, DO      carvedilol  6.25 mg Oral BID With Meals Travon Flores, DO      cloNIDine  0.1 mg Transdermal Weekly Travon Flores, DO      cloZAPine  25 mg Oral HS Travon Flores, DO      cyanocobalamin  1,000 mcg Oral Daily Travon Flores, DO      docusate sodium  100 mg Oral BID Jordan C Holter, DO      enoxaparin  40 mg Subcutaneous Daily Travon Flores, DO      famotidine  20 mg Oral BID Travon Flores, DO      FLUoxetine  20 mg Oral Daily Travon Flores, DO      fluticasone  1 puff Inhalation Daily Travon Flores, DO      hydrALAZINE  10 mg Intravenous Q4H PRN Travon Flores, DO      insulin lispro  1-5 Units Subcutaneous 4x Daily (AC & HS) Travon Flores, DO      LORazepam  1 mg Oral 4x Daily Travon Flores, DO      Or    LORazepam  1 mg Intravenous 4x Daily Travon Flores, DO      Multivitamin  15 mL Oral Daily Travon Flores, DO      nicotine  1 patch Transdermal Daily Travon Flores, DO      ondansetron  4 mg Intravenous Q4H PRN Travon Flores, DO      potassium chloride 20 mEq in dextrose 5 % 1,000 mL infusion   Intravenous Continuous Travon Flores, DO 75 mL/hr at 07/15/24 0625       Behavioral Health Medications: I have personally reviewed all current active medications. Changes as in plan section above.      ADDITIONAL DATA     EKG Results: I have personally reviewed pertinent reports.  7/13 at 0853:  bpm, Qtc  449    Radiology Results: I have personally reviewed pertinent reports. I have personally reviewed pertinent films in PACS.  No results found.  No Chest XR results available for this patient.     Laboratory Results: I have personally reviewed all pertinent laboratory/tests results.  Recent Results (from the past 48 hour(s))   Fingerstick Glucose (POCT)    Collection Time: 07/13/24 11:27 AM   Result Value Ref Range    POC Glucose 101 65 - 140 mg/dl   Fingerstick Glucose (POCT)    Collection Time: 07/13/24  3:59 PM   Result Value Ref Range    POC Glucose 101 65 - 140 mg/dl   Fingerstick Glucose (POCT)    Collection Time: 07/13/24  8:57 PM   Result Value Ref Range    POC Glucose 121 65 - 140 mg/dl   Comprehensive metabolic panel    Collection Time: 07/14/24  4:47 AM   Result Value Ref Range    Sodium 149 (H) 135 - 147 mmol/L    Potassium 3.0 (L) 3.5 - 5.3 mmol/L    Chloride 110 (H) 96 - 108 mmol/L    CO2 28 21 - 32 mmol/L    ANION GAP 11 4 - 13 mmol/L    BUN 23 5 - 25 mg/dL    Creatinine 0.81 0.60 - 1.30 mg/dL    Glucose 94 65 - 140 mg/dL    Calcium 9.3 8.4 - 10.2 mg/dL    AST 23 13 - 39 U/L    ALT 20 7 - 52 U/L    Alkaline Phosphatase 76 34 - 104 U/L    Total Protein 6.4 6.4 - 8.4 g/dL    Albumin 3.7 3.5 - 5.0 g/dL    Total Bilirubin 0.81 0.20 - 1.00 mg/dL    eGFR 80 ml/min/1.73sq m   CBC (With Platelets)    Collection Time: 07/14/24  4:47 AM   Result Value Ref Range    WBC 8.47 4.31 - 10.16 Thousand/uL    RBC 4.63 3.81 - 5.12 Million/uL    Hemoglobin 13.4 11.5 - 15.4 g/dL    Hematocrit 41.6 34.8 - 46.1 %    MCV 90 82 - 98 fL    MCH 28.9 26.8 - 34.3 pg    MCHC 32.2 31.4 - 37.4 g/dL    RDW 17.1 (H) 11.6 - 15.1 %    Platelets 292 149 - 390 Thousands/uL    MPV 10.6 8.9 - 12.7 fL   C-reactive protein    Collection Time: 07/14/24  4:47 AM   Result Value Ref Range    CRP 9.8 (H) <3.0 mg/L   CK    Collection Time: 07/14/24  4:47 AM   Result Value Ref Range    Total  26 - 192 U/L   Fingerstick Glucose (POCT)     Collection Time: 07/14/24  6:06 AM   Result Value Ref Range    POC Glucose 103 65 - 140 mg/dl   Fingerstick Glucose (POCT)    Collection Time: 07/14/24 10:53 AM   Result Value Ref Range    POC Glucose 185 (H) 65 - 140 mg/dl   Basic metabolic panel    Collection Time: 07/14/24  2:40 PM   Result Value Ref Range    Sodium 150 (H) 135 - 147 mmol/L    Potassium 3.7 3.5 - 5.3 mmol/L    Chloride 109 (H) 96 - 108 mmol/L    CO2 29 21 - 32 mmol/L    ANION GAP 12 4 - 13 mmol/L    BUN 22 5 - 25 mg/dL    Creatinine 0.92 0.60 - 1.30 mg/dL    Glucose 107 65 - 140 mg/dL    Calcium 9.6 8.4 - 10.2 mg/dL    eGFR 69 ml/min/1.73sq m   Fingerstick Glucose (POCT)    Collection Time: 07/14/24  3:30 PM   Result Value Ref Range    POC Glucose 94 65 - 140 mg/dl   Fingerstick Glucose (POCT)    Collection Time: 07/14/24  8:28 PM   Result Value Ref Range    POC Glucose 114 65 - 140 mg/dl   Fingerstick Glucose (POCT)    Collection Time: 07/15/24  6:19 AM   Result Value Ref Range    POC Glucose 116 65 - 140 mg/dl   Comprehensive metabolic panel    Collection Time: 07/15/24  6:22 AM   Result Value Ref Range    Sodium 147 135 - 147 mmol/L    Potassium 3.5 3.5 - 5.3 mmol/L    Chloride 110 (H) 96 - 108 mmol/L    CO2 28 21 - 32 mmol/L    ANION GAP 9 4 - 13 mmol/L    BUN 18 5 - 25 mg/dL    Creatinine 0.84 0.60 - 1.30 mg/dL    Glucose 107 65 - 140 mg/dL    Calcium 9.1 8.4 - 10.2 mg/dL    AST 22 13 - 39 U/L    ALT 19 7 - 52 U/L    Alkaline Phosphatase 69 34 - 104 U/L    Total Protein 6.1 (L) 6.4 - 8.4 g/dL    Albumin 3.5 3.5 - 5.0 g/dL    Total Bilirubin 0.80 0.20 - 1.00 mg/dL    eGFR 77 ml/min/1.73sq m   CBC and differential    Collection Time: 07/15/24  6:22 AM   Result Value Ref Range    WBC 7.74 4.31 - 10.16 Thousand/uL    RBC 4.50 3.81 - 5.12 Million/uL    Hemoglobin 13.0 11.5 - 15.4 g/dL    Hematocrit 43.0 34.8 - 46.1 %    MCV 96 82 - 98 fL    MCH 28.9 26.8 - 34.3 pg    MCHC 30.2 (L) 31.4 - 37.4 g/dL    RDW 17.1 (H) 11.6 - 15.1 %    MPV 10.3 8.9 -  12.7 fL    Platelets 274 149 - 390 Thousands/uL    nRBC 0 /100 WBCs    Segmented % 56 43 - 75 %    Immature Grans % 0 0 - 2 %    Lymphocytes % 30 14 - 44 %    Monocytes % 13 (H) 4 - 12 %    Eosinophils Relative 0 0 - 6 %    Basophils Relative 1 0 - 1 %    Absolute Neutrophils 4.32 1.85 - 7.62 Thousands/µL    Absolute Immature Grans 0.03 0.00 - 0.20 Thousand/uL    Absolute Lymphocytes 2.30 0.60 - 4.47 Thousands/µL    Absolute Monocytes 0.98 0.17 - 1.22 Thousand/µL    Eosinophils Absolute 0.00 0.00 - 0.61 Thousand/µL    Basophils Absolute 0.11 (H) 0.00 - 0.10 Thousands/µL   CK    Collection Time: 07/15/24  6:22 AM   Result Value Ref Range    Total CK 85 26 - 192 U/L   C-reactive protein    Collection Time: 07/15/24  6:22 AM   Result Value Ref Range    CRP 6.6 (H) <3.0 mg/L   Phosphorus    Collection Time: 07/15/24  6:22 AM   Result Value Ref Range    Phosphorus 4.2 2.7 - 4.5 mg/dL   Magnesium    Collection Time: 07/15/24  6:22 AM   Result Value Ref Range    Magnesium 2.1 1.9 - 2.7 mg/dL        This note was not shared with the patient due to reasonable likelihood of causing patient harm        Diandra Latham DO Psychiatry Resident, PGY-1  Department of Psychiatry and Behavioral Health  [facility]

## 2024-07-15 NOTE — ASSESSMENT & PLAN NOTE
Hypernatremia secondary to free water deficit.    Initially plan was to transfer to medical service for placement of NG tube and start tube feeds/free water  When she arrived to the medical floor she was drinking fluids.  She had a banana last night  Continue IV fluids through today, if electrolytes stable tomorrow transfer to psychiatry service

## 2024-07-15 NOTE — PROGRESS NOTES
[facility]  Progress Note  Name: Meli Nowak I  MRN: [id number]  Unit/Bed#: 7T [facility] 702-01 I Date of Admission: 7/13/2024   Date of Service: 7/15/2024 I Hospital Day: 2    Assessment & Plan   * Schizoaffective disorder, bipolar type (HCC)  Assessment & Plan  History of reactive airway disease diabetes hypertension and mood disorder transferred from [facility] for medical optimization.  7/2/2024: Admitted to [facility].  7/3/2024: Transferred to medical service for concerns of NMS.  Transferred to [facility] for formal neurology evaluation.  MRI negative.  LP without evidence of infection.  Paraneoplastic panel negative.  7/11/2024: Transferred back to [facility] but has had no oral intake and refusing to take medications.  Reviewed outpatient records.  Previously on buspirone 10 mg twice daily, alprazolam XR 2 mg in the morning and 1 mg at bedtime, clozapine 200 mg in the morning and 300 mg at bedtime, paroxetine 60 mg daily  Initial plan was transferred to medical service for placement of NG tube for feeds and to restart psychiatric medications in hopes to snap the patient out of her acute psychosis/catatonic state but she is now having better oral intake and eating solids  Tolerates diet today, recommend DC fluids and transfer to psychiatry service    Essential (primary) hypertension  Assessment & Plan  Patient's blood pressure has been elevated as she has been refusing pills  Started on clonidine patch over the weekend  Continue carvedilol if will tolerate p.o.  Hydralazine IV as needed    Hypokalemia  Assessment & Plan  Improved, replace with IV fluids    Esophageal reflux  Assessment & Plan  Continue Pepcid    Hypernatremia  Assessment & Plan  Hypernatremia secondary to free water deficit.    Initially plan was to transfer to medical service for placement of NG tube and start tube feeds/free water  When she arrived to the medical floor she was drinking fluids.  She had a banana last  night  Continue IV fluids through today, if electrolytes stable tomorrow transfer to psychiatry service        Reactive airway disease  Assessment & Plan  Continue with prior to admission on Arnuity Ellipta.  No signs of acute exacerbation    Hyperlipidemia  Assessment & Plan  Continue prior to admission atorvastatin    Type 2 diabetes mellitus (HCC)  Assessment & Plan  Lab Results   Component Value Date    HGBA1C 6.4 (H) 2024     Recent Labs     24  1530 248 07/15/24  0619 07/15/24  1049   POCGLU 94 114 116 156*       Prior to admission on metformin.  Placed on sliding scale insulin during medical hospitalization  Blood sugars adequately controlled         VTE Pharmacologic Prophylaxis: lovenox     Patient Centered Rounds:  Patient care rounds were performed with nursing    Discussions with Specialists or Other Care Team Provider: Independent reviewed case with psychiatry team    Time Spent for Care: I have spent a total time of 46 minutes on 07/15/24 in caring for this patient including Diagnostic results, Impressions, Counseling / Coordination of care, Documenting in the medical record, Reviewing / ordering tests, medicine, procedures  , Obtaining or reviewing history  , and Communicating with other healthcare professionals .      Current Length of Stay: 2 day(s)    Current Patient Status: Inpatient   Certification Statement: The patient will continue to require additional inpatient hospital stay due to need for management of catatonia, IVF    Discharge Plan: 24 hours     Code Status: Level 1 - Full Code      Subjective:   Patient seen and evaluated at bedside.  Tolerated a banana this morning.  Speech therapy evaluated and recommended regular diet when alert.    Objective:     Vitals:   Temp (24hrs), Av.5 °F (36.9 °C), Min:97.8 °F (36.6 °C), Max:99.7 °F (37.6 °C)    Temp:  [97.8 °F (36.6 °C)-99.7 °F (37.6 °C)] 97.8 °F (36.6 °C)  HR:  [93-97] 93  Resp:  [19-20] 20  BP: (162-177)/(81-91)  170/91  SpO2:  [95 %-97 %] 97 %  Body mass index is 37.67 kg/m².     Input and Output Summary (last 24 hours):       Intake/Output Summary (Last 24 hours) at 7/15/2024 1253  Last data filed at 7/14/2024 2041  Gross per 24 hour   Intake 720 ml   Output --   Net 720 ml       Physical Exam:     Physical Exam  Vitals reviewed.   Constitutional:       General: She is not in acute distress.     Appearance: She is well-developed. She is not toxic-appearing or diaphoretic.      Comments: Disheveled   HENT:      Head: Normocephalic and atraumatic.      Mouth/Throat:      Mouth: Mucous membranes are moist.   Eyes:      General: No scleral icterus.     Extraocular Movements: Extraocular movements intact.   Cardiovascular:      Rate and Rhythm: Normal rate and regular rhythm.      Heart sounds: Normal heart sounds.   Pulmonary:      Effort: Pulmonary effort is normal. No respiratory distress.      Breath sounds: Normal breath sounds. No wheezing or rales.   Abdominal:      General: There is no distension.      Palpations: Abdomen is soft.      Tenderness: There is no abdominal tenderness. There is no guarding or rebound.   Musculoskeletal:         General: No swelling, tenderness or deformity.   Skin:     General: Skin is warm and dry.   Neurological:      Mental Status: She is alert.   Psychiatric:      Comments: Withdrawn, minimally interactive         Additional Data:     Labs: I have reviewed pertinent results     Results from last 7 days   Lab Units 07/15/24  0622   WBC Thousand/uL 7.74   HEMOGLOBIN g/dL 13.0   HEMATOCRIT % 43.0   PLATELETS Thousands/uL 274   SEGS PCT % 56   LYMPHO PCT % 30   MONO PCT % 13*   EOS PCT % 0     Results from last 7 days   Lab Units 07/15/24  0622   SODIUM mmol/L 147   POTASSIUM mmol/L 3.5   CHLORIDE mmol/L 110*   CO2 mmol/L 28   BUN mg/dL 18   CREATININE mg/dL 0.84   ANION GAP mmol/L 9   CALCIUM mg/dL 9.1   ALBUMIN g/dL 3.5   TOTAL BILIRUBIN mg/dL 0.80   ALK PHOS U/L 69   ALT U/L 19   AST U/L  22   GLUCOSE RANDOM mg/dL 107         Results from last 7 days   Lab Units 07/15/24  1049 07/15/24  0619 07/14/24  2028 07/14/24  1530 07/14/24  1053 07/14/24  0606 07/13/24  2057 07/13/24  1559 07/13/24  1127 07/13/24  0736 07/13/24  0345 07/12/24  2102   POC GLUCOSE mg/dl 156* 116 114 94 185* 103 121 101 101 112 99 93                 Imaging: I have reviewed pertinent imaging       Recent Cultures (last 7 days):           Last 24 Hours Medication List:   Current Facility-Administered Medications   Medication Dose Route Frequency Provider Last Rate    acetaminophen  650 mg Per NG Tube Q4H PRN Travon Flores DO      [START ON 7/16/2024] atorvastatin  10 mg Oral Daily Cristiano Cruz DO      carvedilol  6.25 mg Oral BID With Meals Travon Flores, DO      cloNIDine  0.1 mg Transdermal Weekly Travon Flores, DO      cloZAPine  25 mg Oral HS Travon Flores, DO      cyanocobalamin  1,000 mcg Oral Daily Travon Flores, DO      docusate sodium  100 mg Oral BID Jordan C Holter, DO      enoxaparin  40 mg Subcutaneous Daily Travon Mark, DO      famotidine  20 mg Oral BID Travon Mark, DO      FLUoxetine  20 mg Oral Daily Travon Mark, DO      fluticasone  1 puff Inhalation Daily Travon Mark, DO      hydrALAZINE  10 mg Intravenous Q4H PRN Travon Flores, DO      insulin lispro  1-5 Units Subcutaneous 4x Daily (AC & HS) Travon Flores, DO      LORazepam  1 mg Oral 4x Daily Travon Mark, DO      Or    LORazepam  1 mg Intravenous 4x Daily Travon Mark, DO      Multivitamin  15 mL Oral Daily Travon Mark, DO      nicotine  1 patch Transdermal Daily Travon Flores, DO      ondansetron  4 mg Intravenous Q4H PRN Travon Flores, DO      potassium chloride 20 mEq in dextrose 5 %  20 mEq Intravenous Continuous Cristiano Cruz DO          Today, Patient Was Seen By: Cristiano Cruz DO    ** Please Note: Dictation voice to text software may have been used in the creation of this document. **

## 2024-07-15 NOTE — ASSESSMENT & PLAN NOTE
Lab Results   Component Value Date    HGBA1C 6.4 (H) 05/03/2024     Recent Labs     07/14/24  1530 07/14/24  2028 07/15/24  0619 07/15/24  1049   POCGLU 94 114 116 156*       Prior to admission on metformin.  Placed on sliding scale insulin during medical hospitalization  Blood sugars adequately controlled

## 2024-07-15 NOTE — CASE MANAGEMENT
Case Management Assessment & Discharge Planning Note    Patient name Meli Nowak  Location 7T [facility] 702/7T [facility] 702-01 MRN [id number]  : 1965 Date 7/15/2024       Current Admission Date: 2024  Current Admission Diagnosis:Schizoaffective disorder, bipolar type (Formerly [facility])   Patient Active Problem List    Diagnosis Date Noted Date Diagnosed    Essential (primary) hypertension 2024     Catatonia 2024     Hypokalemia 2024     Arthritis 2024     Altered mental status 2024     Hypernatremia 2024     Schizoaffective disorder, bipolar type ([facility] [place]) 2024     Medical clearance for psychiatric admission 2024     Type 2 diabetes mellitus ([facility]ly [facility]) 2024     Obesity 2024     Psychosis ([facility]) 2024     Tobacco abuse 2024     Hyperlipidemia 2024     Reactive airway disease 2024     NICHOLAS (acute kidney injury) (Formerly [facility]) 2024     SIRS (systemic inflammatory response syndrome) ([facility]) 2024     Abnormal urinalysis 2024     Rhabdomyolysis 2024     Abdominal distension 2024     Diastasis recti 2022     Incisional hernia, without obstruction or gangrene 2022     Right buttock pain 2021     Endometrial cancer ([facility]) 2021     Postmenopausal bleeding 2021     Hepatic steatosis 03/10/2020     HSV-2 infection 2017     Incontinence of urine in female 2015     Polycystic ovaries 2015     Esophageal reflux 2014     Posttraumatic stress disorder 10/28/2013     Agoraphobia with panic disorder 10/10/2011       LOS (days): 2  Geometric Mean LOS (GMLOS) (days): 2.6  Days to GMLOS:0.7     OBJECTIVE:    Risk of Unplanned Readmission Score: 30.8         Current admission status: Inpatient  Referral Reason: Psych    Preferred Pharmacy:   UNKNOWN - FOLLOW UP PRIOR TO DISCHARGE TO E-PRESCRIBE  No address on file      Primary Care Provider: Adriana Bradford MD    Primary Insurance:  MEDICARE  Secondary Insurance:     ASSESSMENT:  Active Health Care Proxies    There are no active Health Care Proxies on file.       Readmission Root Cause  30 Day Readmission: Yes (Was on U)    Patient Information  Admitted from:: Other (comment) ([facility])  Mental Status: Other (Comment) (non verbal not responding)  During Assessment patient was accompanied by: Not accompanied during assessment ([facility] Conchita Hall)  Assessment information provided by:: Other - please comment (ICM- Conchita Hall)  Primary Caregiver: Self  Support Systems: Self, Other (Comment) ([facility] Conchita Poncess)  County of Residence: [place]  What city do you live in?: [place]  Home entry access options. Select all that apply.: Elevator  Type of Current Residence: Apartment  Floor Level: 8  Upon entering residence, is there a bedroom on the main floor (no further steps)?: Yes  Upon entering residence, is there a bathroom on the main floor (no further steps)?: Yes  Living Arrangements: Lives Alone  Is patient a ?: No    Activities of Daily Living Prior to Admission  Functional Status: Independent  Completes ADLs independently?: Yes  Ambulates independently?: Yes  Does patient use assisted devices?: No  Does patient currently own DME?: No  Does patient have a history of Outpatient Therapy (PT/OT)?: No  Does the patient have a history of Short-Term Rehab?: No  Does patient have a history of HHC?: No  Does patient currently have HHC?: No         Patient Information Continued  Income Source: SSI/SSD  Does patient have prescription coverage?: Yes  Does patient receive dialysis treatments?: No  Does patient have a history of substance abuse?: No  Current Status:: 303 (Expires 7/25)  Does patient have a history of Mental Health Diagnosis?: Yes (Schizoaffective disorder/ Schizophrenia)  Is patient receiving treatment for mental health?: Yes  Has patient received inpatient treatment related to mental health in the last 2 years?: Yes ([facility])    Means of  Transportation  Means of Transport to Appts:: Public Transportation - Bus    Social Determinants of Health (SDOH)      Flowsheet Row Most Recent Value   Housing Stability    In the last 12 months, was there a time when you were not able to pay the mortgage or rent on time? N   In the past 12 months, how many times have you moved where you were living? 0   At any time in the past 12 months, were you homeless or living in a shelter (including now)? N   Transportation Needs    In the past 12 months, has lack of transportation kept you from medical appointments or from getting medications? no   In the past 12 months, has lack of transportation kept you from meetings, work, or from getting things needed for daily living? No   Food Insecurity    Within the past 12 months, you worried that your food would run out before you got the money to buy more. Never true   Within the past 12 months, the food you bought just didn't last and you didn't have money to get more. Never true   Utilities    In the past 12 months has the electric, gas, oil, or water company threatened to shut off services in your home? No            DISCHARGE DETAILS:    Discharge planning discussed with:: Intensive - Conchita Hall  Freedom of Choice: Yes     CM contacted family/caregiver?: Yes      Contacts  Patient Contacts: Conchita Hall (Other)  [phone] (Home Phone)  Relationship to Patient:: Treatment Provider (ICM)  Contact Method: Phone  Phone Number: Conchita Titus (Marielle)  [phone] (Home Phone)       Additional Comments: Met with patient at bedside.  Patient non-verbal not responding to CM.  Known to this M from previous admission.  Call to dtr Chari and vinod VM can not leave message.  Call to ICM Conchita and updated on T7 admission. . Conchita has worked with [employer] as an ICM for prior 12 years. Patient reportedly does not have POA or LW. Pateint lives in an apartment alone and able to obtain medications. Patient has a Repayee: [employer]  [place]. Patient also recieves meals on wheels. Patient on Social Security benefits.  Prior to admission patient was Independent and lived alone.  CM department following thru discharge

## 2024-07-15 NOTE — ASSESSMENT & PLAN NOTE
Patient's blood pressure has been elevated as she has been refusing pills  Started on clonidine patch over the weekend  Continue carvedilol if will tolerate p.o.  Hydralazine IV as needed

## 2024-07-16 LAB
ANION GAP SERPL CALCULATED.3IONS-SCNC: 9 MMOL/L (ref 4–13)
BUN SERPL-MCNC: 16 MG/DL (ref 5–25)
CALCIUM SERPL-MCNC: 9.2 MG/DL (ref 8.4–10.2)
CHLORIDE SERPL-SCNC: 106 MMOL/L (ref 96–108)
CO2 SERPL-SCNC: 30 MMOL/L (ref 21–32)
CREAT SERPL-MCNC: 0.79 MG/DL (ref 0.6–1.3)
GFR SERPL CREATININE-BSD FRML MDRD: 83 ML/MIN/1.73SQ M
GLUCOSE SERPL-MCNC: 104 MG/DL (ref 65–140)
GLUCOSE SERPL-MCNC: 108 MG/DL (ref 65–140)
GLUCOSE SERPL-MCNC: 108 MG/DL (ref 65–140)
GLUCOSE SERPL-MCNC: 115 MG/DL (ref 65–140)
GLUCOSE SERPL-MCNC: 96 MG/DL (ref 65–140)
MAGNESIUM SERPL-MCNC: 2.1 MG/DL (ref 1.9–2.7)
POTASSIUM SERPL-SCNC: 3.8 MMOL/L (ref 3.5–5.3)
SODIUM SERPL-SCNC: 145 MMOL/L (ref 135–147)

## 2024-07-16 PROCEDURE — 99232 SBSQ HOSP IP/OBS MODERATE 35: CPT | Performed by: INTERNAL MEDICINE

## 2024-07-16 PROCEDURE — 92526 ORAL FUNCTION THERAPY: CPT

## 2024-07-16 PROCEDURE — 82948 REAGENT STRIP/BLOOD GLUCOSE: CPT

## 2024-07-16 PROCEDURE — 80048 BASIC METABOLIC PNL TOTAL CA: CPT | Performed by: INTERNAL MEDICINE

## 2024-07-16 PROCEDURE — 99232 SBSQ HOSP IP/OBS MODERATE 35: CPT | Performed by: PSYCHIATRY & NEUROLOGY

## 2024-07-16 PROCEDURE — 83735 ASSAY OF MAGNESIUM: CPT | Performed by: INTERNAL MEDICINE

## 2024-07-16 RX ORDER — CARVEDILOL 12.5 MG/1
12.5 TABLET ORAL 2 TIMES DAILY WITH MEALS
Status: DISCONTINUED | OUTPATIENT
Start: 2024-07-16 | End: 2024-07-19

## 2024-07-16 RX ORDER — BISACODYL 10 MG
10 SUPPOSITORY, RECTAL RECTAL DAILY PRN
Status: DISCONTINUED | OUTPATIENT
Start: 2024-07-16 | End: 2024-07-23 | Stop reason: HOSPADM

## 2024-07-16 RX ORDER — CLOZAPINE 25 MG/1
50 TABLET ORAL
Status: DISCONTINUED | OUTPATIENT
Start: 2024-07-16 | End: 2024-07-18

## 2024-07-16 RX ADMIN — FAMOTIDINE 20 MG: 40 POWDER, FOR SUSPENSION ORAL at 18:13

## 2024-07-16 RX ADMIN — LORAZEPAM 1 MG: 2 INJECTION INTRAMUSCULAR; INTRAVENOUS at 12:25

## 2024-07-16 RX ADMIN — LORAZEPAM 1 MG: 2 INJECTION INTRAMUSCULAR; INTRAVENOUS at 09:30

## 2024-07-16 RX ADMIN — LORAZEPAM 1 MG: 2 INJECTION INTRAMUSCULAR; INTRAVENOUS at 22:11

## 2024-07-16 RX ADMIN — CARVEDILOL 12.5 MG: 12.5 TABLET, FILM COATED ORAL at 09:30

## 2024-07-16 RX ADMIN — LORAZEPAM 1 MG: 2 INJECTION INTRAMUSCULAR; INTRAVENOUS at 17:36

## 2024-07-16 RX ADMIN — FLUOXETINE 20 MG: 20 SOLUTION ORAL at 09:30

## 2024-07-16 RX ADMIN — NICOTINE 1 PATCH: 7 PATCH, EXTENDED RELEASE TRANSDERMAL at 09:30

## 2024-07-16 RX ADMIN — POTASSIUM CHLORIDE 20 MEQ: 2 INJECTION, SOLUTION, CONCENTRATE INTRAVENOUS at 02:12

## 2024-07-16 NOTE — ASSESSMENT & PLAN NOTE
Lab Results   Component Value Date    HGBA1C 6.4 (H) 05/03/2024     Recent Labs     07/15/24  1049 07/15/24  1608 07/15/24  2018 07/16/24  0600   POCGLU 156* 118 107 104       Prior to admission on metformin.  Placed on sliding scale insulin during medical hospitalization  Blood sugars adequately controlled

## 2024-07-16 NOTE — PROGRESS NOTES
[facility]  Progress Note  Name: Meli Nowak I  MRN: [id number]  Unit/Bed#: 7T [facility] 702-01 I Date of Admission: 7/13/2024   Date of Service: 7/16/2024 I Hospital Day: 3    Assessment & Plan   * Schizoaffective disorder, bipolar type (HCC)  Assessment & Plan  History of reactive airway disease diabetes hypertension and mood disorder transferred from [facility] for medical optimization.  7/2/2024: Admitted to [facility].  7/3/2024: Transferred to medical service for concerns of NMS.  Transferred to [facility] for formal neurology evaluation.  MRI negative.  LP without evidence of infection.  Paraneoplastic panel negative.  7/11/2024: Transferred back to [facility] but has had no oral intake and refusing to take medications.  Reviewed outpatient records.  Previously on buspirone 10 mg twice daily, alprazolam XR 2 mg in the morning and 1 mg at bedtime, clozapine 200 mg in the morning and 300 mg at bedtime, paroxetine 60 mg daily  Still with inconsistent oral intake and labile BP  Once tolerating consistent oral diet okay for transfer back to psychiatry service    Essential (primary) hypertension  Assessment & Plan  Patient's blood pressure has been elevated due to inconsistent oral intake of pills  Started on clonidine patch over the weekend  Increase carvedilol to 12.5 twice daily  Continue hydralazine as needed    Hypokalemia  Assessment & Plan  Improved, replace with IV fluids    Esophageal reflux  Assessment & Plan  Continue Pepcid    Hypernatremia  Assessment & Plan  Hypernatremia secondary to free water deficit.    Initially plan was to transfer to medical service for placement of NG tube and start tube feeds/free water.  She has been having intermittent oral intake.  Will need to monitor over 24 hours to see how consistent much of her meals she is eating  Will discontinue IV fluids at this time and monitor BMP      Reactive airway disease  Assessment & Plan  Continue with prior to admission on  Arnuity Ellipta.  No signs of acute exacerbation    Hyperlipidemia  Assessment & Plan  Continue prior to admission atorvastatin    Type 2 diabetes mellitus (HCC)  Assessment & Plan  Lab Results   Component Value Date    HGBA1C 6.4 (H) 2024     Recent Labs     07/15/24  1049 07/15/24  1608 07/15/24  2018 07/16/24  0600   POCGLU 156* 118 107 104       Prior to admission on metformin.  Placed on sliding scale insulin during medical hospitalization  Blood sugars adequately controlled           VTE Pharmacologic Prophylaxis: lovenox     Patient Centered Rounds:  Patient care rounds were performed with nursing    Discussions with Specialists or Other Care Team Provider: Psychiatry     Education and Discussions with Family / Patient: Patients outpatient      Time Spent for Care: I have spent a total time of 46 minutes on 24 in caring for this patient including Diagnostic results, Risks and benefits of tx options, Instructions for management, Patient and family education, Impressions, Counseling / Coordination of care, Documenting in the medical record, Reviewing / ordering tests, medicine, procedures  , Obtaining or reviewing history  , and Communicating with other healthcare professionals .      Current Length of Stay: 3 day(s)    Current Patient Status: Inpatient   Certification Statement: The patient will continue to require additional inpatient hospital stay due to management of catatonia, poor oral intake, hypertensive urgency     Discharge Plan: 24 - 48 hours pending oral intake     Code Status: Level 1 - Full Code      Subjective:   Patient seen and evaluated at bedside. No overnight events. Tolerated 75% of morning pills per nursing.     Objective:     Vitals:   Temp (24hrs), Av.4 °F (36.9 °C), Min:97.7 °F (36.5 °C), Max:98.9 °F (37.2 °C)    Temp:  [97.7 °F (36.5 °C)-98.9 °F (37.2 °C)] 97.7 °F (36.5 °C)  HR:  [82-94] 82  Resp:  [20] 20  BP: (137-190)/(76-94) 190/94  SpO2:  [93 %-100 %]  93 %  Body mass index is 37.67 kg/m².     Input and Output Summary (last 24 hours):     No intake or output data in the 24 hours ending 07/16/24 1114    Physical Exam:     Physical Exam  Vitals reviewed.   Constitutional:       General: She is not in acute distress.     Appearance: She is well-developed. She is not ill-appearing, toxic-appearing or diaphoretic.   HENT:      Head: Normocephalic and atraumatic.      Mouth/Throat:      Mouth: Mucous membranes are moist.   Eyes:      General: No scleral icterus.     Extraocular Movements: Extraocular movements intact.   Cardiovascular:      Rate and Rhythm: Normal rate and regular rhythm.      Heart sounds: Normal heart sounds.   Pulmonary:      Effort: Pulmonary effort is normal. No respiratory distress.      Breath sounds: Normal breath sounds. No wheezing or rales.   Abdominal:      General: There is no distension.      Palpations: Abdomen is soft.      Tenderness: There is no abdominal tenderness. There is no guarding or rebound.   Musculoskeletal:         General: No swelling, tenderness or deformity.      Cervical back: Normal range of motion.   Skin:     General: Skin is warm and dry.   Neurological:      General: No focal deficit present.      Mental Status: She is alert.      Comments: Minimal interaction, tracks with eyes, does not respond to questions or commands    Psychiatric:      Comments: Flat affect          Additional Data:     Labs: I have reviewed pertinent results     Results from last 7 days   Lab Units 07/15/24  0622   WBC Thousand/uL 7.74   HEMOGLOBIN g/dL 13.0   HEMATOCRIT % 43.0   PLATELETS Thousands/uL 274   SEGS PCT % 56   LYMPHO PCT % 30   MONO PCT % 13*   EOS PCT % 0     Results from last 7 days   Lab Units 07/16/24  0527 07/15/24  0622   SODIUM mmol/L 145 147   POTASSIUM mmol/L 3.8 3.5   CHLORIDE mmol/L 106 110*   CO2 mmol/L 30 28   BUN mg/dL 16 18   CREATININE mg/dL 0.79 0.84   ANION GAP mmol/L 9 9   CALCIUM mg/dL 9.2 9.1   ALBUMIN g/dL   --  3.5   TOTAL BILIRUBIN mg/dL  --  0.80   ALK PHOS U/L  --  69   ALT U/L  --  19   AST U/L  --  22   GLUCOSE RANDOM mg/dL 115 107         Results from last 7 days   Lab Units 07/16/24  1106 07/16/24  0600 07/15/24  2018 07/15/24  1608 07/15/24  1049 07/15/24  0619 07/14/24 2028 07/14/24  1530 07/14/24  1053 07/14/24  0606 07/13/24 2057 07/13/24  1559   POC GLUCOSE mg/dl 108 104 107 118 156* 116 114 94 185* 103 121 101                 Imaging: I have reviewed pertinent imaging       Recent Cultures (last 7 days):           Last 24 Hours Medication List:   Current Facility-Administered Medications   Medication Dose Route Frequency Provider Last Rate    acetaminophen  650 mg Per NG Tube Q4H PRN Travon Flores, DO      atorvastatin  10 mg Oral Daily Cristiano Cruz DO      bisacodyl  10 mg Rectal Daily PRN Cristiano Cruz DO      carvedilol  12.5 mg Oral BID With Meals Cristiano Cruz DO      cloNIDine  0.1 mg Transdermal Weekly Travon Flores, DO      cloZAPine  50 mg Oral HS Cristiano Cruz DO      cyanocobalamin  1,000 mcg Oral Daily Travon Flores, DO      docusate sodium  100 mg Oral BID Jordan C Holter, DO      enoxaparin  40 mg Subcutaneous Daily Travon Flores, DO      famotidine  20 mg Oral BID Travon Mark, DO      FLUoxetine  20 mg Oral Daily Travon Flores, DO      fluticasone  1 puff Inhalation Daily Travon Flores, DO      hydrALAZINE  10 mg Intravenous Q4H PRN Travon Flores, DO      insulin lispro  1-5 Units Subcutaneous 4x Daily (AC & HS) Travon Flores, DO      LORazepam  1 mg Oral 4x Daily Travon Mark, DO      Or    LORazepam  1 mg Intravenous 4x Daily Travon Mark, DO      Multivitamin  15 mL Oral Daily Travon Flores, DO      nicotine  1 patch Transdermal Daily Travon Flores, DO      ondansetron  4 mg Intravenous Q4H PRN Travon Flores,           Today, Patient Was Seen By: Cristiano Cruz DO    ** Please Note: Dictation voice to text software may have been used in the creation of  this document. **

## 2024-07-16 NOTE — PLAN OF CARE
Problem: PAIN - ADULT  Goal: Verbalizes/displays adequate comfort level or baseline comfort level  Description: Interventions:  - Encourage patient to monitor pain and request assistance  - Assess pain using appropriate pain scale  - Administer analgesics based on type and severity of pain and evaluate response  - Implement non-pharmacological measures as appropriate and evaluate response  - Consider cultural and social influences on pain and pain management  - Notify physician/advanced practitioner if interventions unsuccessful or patient reports new pain  Outcome: Progressing     Problem: INFECTION - ADULT  Goal: Absence or prevention of progression during hospitalization  Description: INTERVENTIONS:  - Assess and monitor for signs and symptoms of infection  - Monitor lab/diagnostic results  - Monitor all insertion sites, i.e. indwelling lines, tubes, and drains  - Monitor endotracheal if appropriate and nasal secretions for changes in amount and color  - [place] appropriate cooling/warming therapies per order  - Administer medications as ordered  - Instruct and encourage patient and family to use good hand hygiene technique  - Identify and instruct in appropriate isolation precautions for identified infection/condition  Outcome: Progressing  Goal: Absence of fever/infection during neutropenic period  Description: INTERVENTIONS:  - Monitor WBC    Outcome: Progressing     Problem: SAFETY ADULT  Goal: Patient will remain free of falls  Description: INTERVENTIONS:  - Educate patient/family on patient safety including physical limitations  - Instruct patient to call for assistance with activity   - Consult OT/PT to assist with strengthening/mobility   - Keep Call bell within reach  - Keep bed low and locked with side rails adjusted as appropriate  - Keep care items and personal belongings within reach  - Initiate and maintain comfort rounds  - Make Fall Risk Sign visible to staff  - Apply yellow socks and bracelet  for high fall risk patients  - Consider moving patient to room near nurses station  Outcome: Progressing  Goal: Maintain or return to baseline ADL function  Description: INTERVENTIONS:  -  Assess patient's ability to carry out ADLs; assess patient's baseline for ADL function and identify physical deficits which impact ability to perform ADLs (bathing, care of mouth/teeth, toileting, grooming, dressing, etc.)  - Assess/evaluate cause of self-care deficits   - Assess range of motion  - Assess patient's mobility; develop plan if impaired  - Assess patient's need for assistive devices and provide as appropriate  - Encourage maximum independence but intervene and supervise when necessary  - Involve family in performance of ADLs  - Assess for home care needs following discharge   - Consider OT consult to assist with ADL evaluation and planning for discharge  - Provide patient education as appropriate  Outcome: Progressing  Goal: Maintains/Returns to pre admission functional level  Description: INTERVENTIONS:  - Perform AM-PAC 6 Click Basic Mobility/ Daily Activity assessment daily.  - Set and communicate daily mobility goal to care team and patient/family/caregiver.   - Collaborate with rehabilitation services on mobility goals if consulted  - Out of bed for toileting  - Record patient progress and toleration of activity level   Outcome: Progressing     Problem: DISCHARGE PLANNING  Goal: Discharge to home or other facility with appropriate resources  Description: INTERVENTIONS:  - Identify barriers to discharge w/patient and caregiver  - Arrange for needed discharge resources and transportation as appropriate  - Identify discharge learning needs (meds, wound care, etc.)  - Arrange for interpretive services to assist at discharge as needed  - Refer to Case Management Department for coordinating discharge planning if the patient needs post-hospital services based on physician/advanced practitioner order or complex needs  related to functional status, cognitive ability, or social support system  Outcome: Progressing     Problem: Knowledge Deficit  Goal: Patient/family/caregiver demonstrates understanding of disease process, treatment plan, medications, and discharge instructions  Description: Complete learning assessment and assess knowledge base.  Interventions:  - Provide teaching at level of understanding  - Provide teaching via preferred learning methods  Outcome: Progressing     Problem: Prexisting or High Potential for Compromised Skin Integrity  Goal: Skin integrity is maintained or improved  Description: INTERVENTIONS:  - Identify patients at risk for skin breakdown  - Assess and monitor skin integrity  - Assess and monitor nutrition and hydration status  - Monitor labs   - Assess for incontinence   - Turn and reposition patient  - Assist with mobility/ambulation  - Relieve pressure over bony prominences  - Avoid friction and shearing  - Provide appropriate hygiene as needed including keeping skin clean and dry  - Evaluate need for skin moisturizer/barrier cream  - Collaborate with interdisciplinary team   - Patient/family teaching  - Consider wound care consult   Outcome: Progressing     Problem: Nutrition/Hydration-ADULT  Goal: Nutrient/Hydration intake appropriate for improving, restoring or maintaining nutritional needs  Description: Monitor and assess patient's nutrition/hydration status for malnutrition. Collaborate with interdisciplinary team and initiate plan and interventions as ordered.  Monitor patient's weight and dietary intake as ordered or per policy. Utilize nutrition screening tool and intervene as necessary. Determine patient's food preferences and provide high-protein, high-caloric foods as appropriate.     INTERVENTIONS:  - Monitor oral intake, urinary output, labs, and treatment plans  - Assess nutrition and hydration status and recommend course of action  - Evaluate amount of meals eaten  - Assist  patient with eating if necessary   - Allow adequate time for meals  - Recommend/ encourage appropriate diets, oral nutritional supplements, and vitamin/mineral supplements  - Order, calculate, and assess calorie counts as needed  - Recommend, monitor, and adjust tube feedings and TPN/PPN based on assessed needs  - Assess need for intravenous fluids  - Provide specific nutrition/hydration education as appropriate  - Include patient/family/caregiver in decisions related to nutrition  Outcome: Progressing     Problem: METABOLIC, FLUID AND ELECTROLYTES - ADULT  Goal: Electrolytes maintained within normal limits  Description: INTERVENTIONS:  - Monitor labs and assess patient for signs and symptoms of electrolyte imbalances  - Administer electrolyte replacement as ordered  - Monitor response to electrolyte replacements, including repeat lab results as appropriate  - Instruct patient on fluid and nutrition as appropriate  Outcome: Progressing  Goal: Glucose maintained within target range  Description: INTERVENTIONS:  - Monitor Blood Glucose as ordered  - Assess for signs and symptoms of hyperglycemia and hypoglycemia  - Administer ordered medications to maintain glucose within target range  - Assess nutritional intake and initiate nutrition service referral as needed  Outcome: Progressing     Problem: MOBILITY - ADULT  Goal: Maintain or return to baseline ADL function  Description: INTERVENTIONS:  -  Assess patient's ability to carry out ADLs; assess patient's baseline for ADL function and identify physical deficits which impact ability to perform ADLs (bathing, care of mouth/teeth, toileting, grooming, dressing, etc.)  - Assess/evaluate cause of self-care deficits   - Assess range of motion  - Assess patient's mobility; develop plan if impaired  - Assess patient's need for assistive devices and provide as appropriate  - Encourage maximum independence but intervene and supervise when necessary  - Involve family in  performance of ADLs  - Assess for home care needs following discharge   - Consider OT consult to assist with ADL evaluation and planning for discharge  - Provide patient education as appropriate  Outcome: Progressing  Goal: Maintains/Returns to pre admission functional level  Description: INTERVENTIONS:  - Perform AM-PAC 6 Click Basic Mobility/ Daily Activity assessment daily.  - Set and communicate daily mobility goal to care team and patient/family/caregiver.   - Collaborate with rehabilitation services on mobility goals if consulted  - Out of bed for toileting  - Record patient progress and toleration of activity level   Outcome: Progressing

## 2024-07-16 NOTE — SPEECH THERAPY NOTE
Speech Language/Pathology    Speech/Language Pathology Progress Note    Patient Name: Meli Nowak  Today's Date: 2024           Summary:  Pt continues with limited participation/alertness. SLP attempted to see patient earlier in the morning x2, however pt with poor alertness and unable to participate.   Pt seen upright in bed this session with minimally improved alertness. Nursing reports pt had slightly improved intake with crushed medications this morning. SLP attempted oral care with pt, however pt very orally defensive. After max encouragement and being offered multiple different food/drink choices pt only agreeable to a few very small sips of thin liquid via tsp. Mild to moderate anterior loss noted, but no immediate overt s/s aspiration. Ongoing assessment of swallowing continues to be limited by participation.   Recommend continuing current diet. Hold PO if not alert. SLP to follow for ongoing assessment of swallowing.      Assessment:  Limited by pt participation.     SLP RECOMMENDATIONS:   Regular/thin liquid consistency   Aspiration precautions  Feeding assist   SLP to follow         Lab Results   Component Value Date    WBC 7.74 07/15/2024    HGB 13.0 07/15/2024    HCT 43.0 07/15/2024    MCV 96 07/15/2024     07/15/2024           Problem List  Principal Problem:    Schizoaffective disorder, bipolar type (HCC)  Active Problems:    Type 2 diabetes mellitus (HCC)    Hyperlipidemia    Reactive airway disease    Hypernatremia    Esophageal reflux    Hypokalemia    Essential (primary) hypertension       Past Medical History  Past Medical History:   Diagnosis Date    Cognitive impairment     Diabetes mellitus (HCC)     Essential (primary) hypertension     Psychosis (HCC)         Past Surgical History  Past Surgical History:   Procedure Laterality Date     SECTION      CHOLECYSTECTOMY

## 2024-07-16 NOTE — PLAN OF CARE
Problem: PAIN - ADULT  Goal: Verbalizes/displays adequate comfort level or baseline comfort level  Description: Interventions:  - Encourage patient to monitor pain and request assistance  - Assess pain using appropriate pain scale  - Administer analgesics based on type and severity of pain and evaluate response  - Implement non-pharmacological measures as appropriate and evaluate response  - Consider cultural and social influences on pain and pain management  - Notify physician/advanced practitioner if interventions unsuccessful or patient reports new pain  Outcome: Progressing     Problem: INFECTION - ADULT  Goal: Absence or prevention of progression during hospitalization  Description: INTERVENTIONS:  - Assess and monitor for signs and symptoms of infection  - Monitor lab/diagnostic results  - Monitor all insertion sites, i.e. indwelling lines, tubes, and drains  - Monitor endotracheal if appropriate and nasal secretions for changes in amount and color  - [place] appropriate cooling/warming therapies per order  - Administer medications as ordered  - Instruct and encourage patient and family to use good hand hygiene technique  - Identify and instruct in appropriate isolation precautions for identified infection/condition  Outcome: Progressing  Goal: Absence of fever/infection during neutropenic period  Description: INTERVENTIONS:  - Monitor WBC    Outcome: Progressing     Problem: SAFETY ADULT  Goal: Patient will remain free of falls  Description: INTERVENTIONS:  - Educate patient/family on patient safety including physical limitations  - Instruct patient to call for assistance with activity   - Consult OT/PT to assist with strengthening/mobility   - Keep Call bell within reach  - Keep bed low and locked with side rails adjusted as appropriate  - Keep care items and personal belongings within reach  - Initiate and maintain comfort rounds  - Make Fall Risk Sign visible to staff  - Offer Toileting every  Hours,  in advance of need  - Initiate/Maintain alarm  - Obtain necessary fall risk management equipment:   - Apply yellow socks and bracelet for high fall risk patients  - Consider moving patient to room near nurses station  Outcome: Progressing  Goal: Maintain or return to baseline ADL function  Description: INTERVENTIONS:  -  Assess patient's ability to carry out ADLs; assess patient's baseline for ADL function and identify physical deficits which impact ability to perform ADLs (bathing, care of mouth/teeth, toileting, grooming, dressing, etc.)  - Assess/evaluate cause of self-care deficits   - Assess range of motion  - Assess patient's mobility; develop plan if impaired  - Assess patient's need for assistive devices and provide as appropriate  - Encourage maximum independence but intervene and supervise when necessary  - Involve family in performance of ADLs  - Assess for home care needs following discharge   - Consider OT consult to assist with ADL evaluation and planning for discharge  - Provide patient education as appropriate  Outcome: Progressing  Goal: Maintains/Returns to pre admission functional level  Description: INTERVENTIONS:  - Perform AM-PAC 6 Click Basic Mobility/ Daily Activity assessment daily.  - Set and communicate daily mobility goal to care team and patient/family/caregiver.   - Collaborate with rehabilitation services on mobility goals if consulted  - Perform Range of Motion  times a day.  - Reposition patient every  hours.  - Dangle patient  times a day  - Stand patient  times a day  - Ambulate patient  times a day  - Out of bed to chair  times a day   - Out of bed for meals  times a day  - Out of bed for toileting  - Record patient progress and toleration of activity level   Outcome: Progressing     Problem: DISCHARGE PLANNING  Goal: Discharge to home or other facility with appropriate resources  Description: INTERVENTIONS:  - Identify barriers to discharge w/patient and caregiver  - Arrange for  needed discharge resources and transportation as appropriate  - Identify discharge learning needs (meds, wound care, etc.)  - Arrange for interpretive services to assist at discharge as needed  - Refer to Case Management Department for coordinating discharge planning if the patient needs post-hospital services based on physician/advanced practitioner order or complex needs related to functional status, cognitive ability, or social support system  Outcome: Progressing     Problem: Knowledge Deficit  Goal: Patient/family/caregiver demonstrates understanding of disease process, treatment plan, medications, and discharge instructions  Description: Complete learning assessment and assess knowledge base.  Interventions:  - Provide teaching at level of understanding  - Provide teaching via preferred learning methods  Outcome: Progressing     Problem: Prexisting or High Potential for Compromised Skin Integrity  Goal: Skin integrity is maintained or improved  Description: INTERVENTIONS:  - Identify patients at risk for skin breakdown  - Assess and monitor skin integrity  - Assess and monitor nutrition and hydration status  - Monitor labs   - Assess for incontinence   - Turn and reposition patient  - Assist with mobility/ambulation  - Relieve pressure over bony prominences  - Avoid friction and shearing  - Provide appropriate hygiene as needed including keeping skin clean and dry  - Evaluate need for skin moisturizer/barrier cream  - Collaborate with interdisciplinary team   - Patient/family teaching  - Consider wound care consult   Outcome: Progressing     Problem: Nutrition/Hydration-ADULT  Goal: Nutrient/Hydration intake appropriate for improving, restoring or maintaining nutritional needs  Description: Monitor and assess patient's nutrition/hydration status for malnutrition. Collaborate with interdisciplinary team and initiate plan and interventions as ordered.  Monitor patient's weight and dietary intake as ordered or  per policy. Utilize nutrition screening tool and intervene as necessary. Determine patient's food preferences and provide high-protein, high-caloric foods as appropriate.     INTERVENTIONS:  - Monitor oral intake, urinary output, labs, and treatment plans  - Assess nutrition and hydration status and recommend course of action  - Evaluate amount of meals eaten  - Assist patient with eating if necessary   - Allow adequate time for meals  - Recommend/ encourage appropriate diets, oral nutritional supplements, and vitamin/mineral supplements  - Order, calculate, and assess calorie counts as needed  - Recommend, monitor, and adjust tube feedings and TPN/PPN based on assessed needs  - Assess need for intravenous fluids  - Provide specific nutrition/hydration education as appropriate  - Include patient/family/caregiver in decisions related to nutrition  Outcome: Progressing     Problem: METABOLIC, FLUID AND ELECTROLYTES - ADULT  Goal: Electrolytes maintained within normal limits  Description: INTERVENTIONS:  - Monitor labs and assess patient for signs and symptoms of electrolyte imbalances  - Administer electrolyte replacement as ordered  - Monitor response to electrolyte replacements, including repeat lab results as appropriate  - Instruct patient on fluid and nutrition as appropriate  Outcome: Progressing  Goal: Glucose maintained within target range  Description: INTERVENTIONS:  - Monitor Blood Glucose as ordered  - Assess for signs and symptoms of hyperglycemia and hypoglycemia  - Administer ordered medications to maintain glucose within target range  - Assess nutritional intake and initiate nutrition service referral as needed  Outcome: Progressing     Problem: MOBILITY - ADULT  Goal: Maintain or return to baseline ADL function  Description: INTERVENTIONS:  -  Assess patient's ability to carry out ADLs; assess patient's baseline for ADL function and identify physical deficits which impact ability to perform ADLs  (bathing, care of mouth/teeth, toileting, grooming, dressing, etc.)  - Assess/evaluate cause of self-care deficits   - Assess range of motion  - Assess patient's mobility; develop plan if impaired  - Assess patient's need for assistive devices and provide as appropriate  - Encourage maximum independence but intervene and supervise when necessary  - Involve family in performance of ADLs  - Assess for home care needs following discharge   - Consider OT consult to assist with ADL evaluation and planning for discharge  - Provide patient education as appropriate  Outcome: Progressing  Goal: Maintains/Returns to pre admission functional level  Description: INTERVENTIONS:  - Perform AM-PAC 6 Click Basic Mobility/ Daily Activity assessment daily.  - Set and communicate daily mobility goal to care team and patient/family/caregiver.   - Collaborate with rehabilitation services on mobility goals if consulted  - Perform Range of Motion  times a day.  - Reposition patient every  hours.  - Dangle patient  times a day  - Stand patient  times a day  - Ambulate patient  times a day  - Out of bed to chair  times a day   - Out of bed for meals  times a day  - Out of bed for toileting  - Record patient progress and toleration of activity level   Outcome: Progressing

## 2024-07-16 NOTE — ASSESSMENT & PLAN NOTE
History of reactive airway disease diabetes hypertension and mood disorder transferred from [facility] for medical optimization.  7/2/2024: Admitted to [facility].  7/3/2024: Transferred to medical service for concerns of NMS.  Transferred to [facility] for formal neurology evaluation.  MRI negative.  LP without evidence of infection.  Paraneoplastic panel negative.  7/11/2024: Transferred back to [facility] but has had no oral intake and refusing to take medications.  Reviewed outpatient records.  Previously on buspirone 10 mg twice daily, alprazolam XR 2 mg in the morning and 1 mg at bedtime, clozapine 200 mg in the morning and 300 mg at bedtime, paroxetine 60 mg daily  Still with inconsistent oral intake and labile BP  Once tolerating consistent oral diet okay for transfer back to psychiatry service

## 2024-07-16 NOTE — PROGRESS NOTES
PSYCHIATRY CONSULT SERVICE  PROGRESS NOTE    Meli Nowak 59 y.o. female MRN: [id number]  Unit/Bed#: 7T [facility] 702-01 Encounter: [id number]     ASSESSMENT / PLAN     Meli Nowak is a 59 y.o. female,with history of schizoaffective disorder, who initially presented to [place] inpatient [facility] behavioral health for psychosis on 07/03/2024. Patient was transferred to the medical floor twice during her admission, formerly presenting with possible atypical NMS likely secondary to abrupt discontinuation of Clorazil. Patient had a negative neurological workup for encephalitis/meningitis/autoimmune cause. She is currently back on the medical floor due to poor PO intake, dehydration, and elevated BP. Patient remains nonverbal, appears paranoid, has not been eating while on the medical floor. Plan to increase Clozaril to 50 mg HS for schizoaffective disorder. Presentation reveals low suspicion for NMS at this time.    Principal Psychiatric Problem:  Schizoaffective disorder, acute on chronic (HCC)    Principal Problem:    Schizoaffective disorder, bipolar type ([facility])  Active Problems:    Type 2 diabetes mellitus (HCC)    Hyperlipidemia    Reactive airway disease    Hypernatremia    Esophageal reflux    Hypokalemia    Essential (primary) hypertension      RECOMMENDED TREATMENT     Discussed plan with primary team as follows:  Hospital labs reviewed.  Collaborate with collaterals for further assessment and disposition as indicated.  Patient currently committed involuntarily via 303 (expires 7/25), with plan to return to inpatient behavioral health unit once medically stable.  Recommend the following psychotropic medication regimen at this time:  Increase Clozaril from 25 mg to 50 mg HS.  Continue medical management per primary team.  Observation level: In-person 1:1 continual observation  Psychiatry will continue to follow as needed. Please contact our service via [facility] with any additional questions or concerns. If  contacting after hours, please call or TigerText the on-call team (GOVINDJOANNA: [phone]) with any questions or concerns.           SUBJECTIVE     Patient was seen and evaluated for continuity of care. Meli appears overall unchanged from the day prior. She appears more calm, but still internally paranoid toward this writer, nonverbal, unable to participate in conversation. She continues to have poor oral intake. Additional information unable to obtain due to patient's current presentation.      Psychiatric Review of Systems:  Behavior over the last 24 hours: unchanged  Sleep:  unable to assess  Appetite: poor oral intake per staff  Medication side effects: none verbalized  ROS: Review of systems could not be obtained due to patient factors    OBJECTIVE     Vital signs in last 24 hours:  Temp:  [97.7 °F (36.5 °C)-98.9 °F (37.2 °C)] 97.7 °F (36.5 °C)  HR:  [82-94] 82  Resp:  [20] 20  BP: (137-190)/(76-94) 190/94    Mental Status Evaluation:  Appearance:  disheveled, dressed in hospital attire, looks stated age, overweight   Behavior:  no eye contact, evasive, does not answer questions, does not want to talk   Speech:  mute   Mood:  Unable to assess   Affect:  Unable to assess   Language: unable to assess   Thought Process:  unable to assess   Associations: unable to assess - does not answer   Thought Content:  Unable to assess   Perceptual Disturbances: Unable to assess   Risk Potential: Suicidal ideation - unable to assess  Homicidal ideation - unable to assess  Potential for aggression - Not at present   Sensorium:  unable to assess, does not answer   Memory:  patient does not answer   Consciousness:  alert and awake   Attention/Concentration: Unable to assess   Intellect: unable to assess   Fund of Knowledge: Unable to assess   Insight:  Unable to assess   Judgment: Unable to assess   Muscle Strength Muscle Tone: Unable to assess  Increased spasticity   Gait/Station: Moving all extremities, did not assess gait    Motor Activity: no abnormal movements           ACTIVE MEDICATIONS     Current Medications:  Current Facility-Administered Medications   Medication Dose Route Frequency Provider Last Rate    acetaminophen  650 mg Per NG Tube Q4H PRN Travon Haywoodng, DO      atorvastatin  10 mg Oral Daily Cristiano Cruz, DO      bisacodyl  10 mg Rectal Daily PRN Cristiano Cruz, DO      carvedilol  12.5 mg Oral BID With Meals Cristiano Cruz,       cloNIDine  0.1 mg Transdermal Weekly Travon Flores, DO      cloZAPine  50 mg Oral HS Cristiano Cruz, DO      cyanocobalamin  1,000 mcg Oral Daily Travon Haywoodng, DO      docusate sodium  100 mg Oral BID Gerardo C Holter, DO      enoxaparin  40 mg Subcutaneous Daily Travon Mark, DO      famotidine  20 mg Oral BID Travon Mark, DO      FLUoxetine  20 mg Oral Daily Travon Mark, DO      fluticasone  1 puff Inhalation Daily Travon Mark, DO      hydrALAZINE  10 mg Intravenous Q4H PRN Travon Haywoodng, DO      insulin lispro  1-5 Units Subcutaneous 4x Daily (AC & HS) Travon Haywoodng, DO      LORazepam  1 mg Oral 4x Daily Travon Mark, DO      Or    LORazepam  1 mg Intravenous 4x Daily Travon Mark, DO      Multivitamin  15 mL Oral Daily Travon Mark, DO      nicotine  1 patch Transdermal Daily Travon Mark, DO      ondansetron  4 mg Intravenous Q4H PRN Travon Mark, DO         Behavioral Health Medications: I have personally reviewed all current active medications. Changes as in plan section above.      ADDITIONAL DATA     EKG Results: I have personally reviewed pertinent reports.  No results found for this visit on 07/13/24 (from the past 1000 hour(s)).    Radiology Results: I have personally reviewed pertinent reports. I have personally reviewed pertinent films in PACS.  No results found.  No Chest XR results available for this patient.     Laboratory Results: I have personally reviewed all pertinent laboratory/tests results.  Recent Results (from the past 48 hour(s))   Basic  metabolic panel    Collection Time: 07/14/24  2:40 PM   Result Value Ref Range    Sodium 150 (H) 135 - 147 mmol/L    Potassium 3.7 3.5 - 5.3 mmol/L    Chloride 109 (H) 96 - 108 mmol/L    CO2 29 21 - 32 mmol/L    ANION GAP 12 4 - 13 mmol/L    BUN 22 5 - 25 mg/dL    Creatinine 0.92 0.60 - 1.30 mg/dL    Glucose 107 65 - 140 mg/dL    Calcium 9.6 8.4 - 10.2 mg/dL    eGFR 69 ml/min/1.73sq m   Fingerstick Glucose (POCT)    Collection Time: 07/14/24  3:30 PM   Result Value Ref Range    POC Glucose 94 65 - 140 mg/dl   Fingerstick Glucose (POCT)    Collection Time: 07/14/24  8:28 PM   Result Value Ref Range    POC Glucose 114 65 - 140 mg/dl   Fingerstick Glucose (POCT)    Collection Time: 07/15/24  6:19 AM   Result Value Ref Range    POC Glucose 116 65 - 140 mg/dl   Comprehensive metabolic panel    Collection Time: 07/15/24  6:22 AM   Result Value Ref Range    Sodium 147 135 - 147 mmol/L    Potassium 3.5 3.5 - 5.3 mmol/L    Chloride 110 (H) 96 - 108 mmol/L    CO2 28 21 - 32 mmol/L    ANION GAP 9 4 - 13 mmol/L    BUN 18 5 - 25 mg/dL    Creatinine 0.84 0.60 - 1.30 mg/dL    Glucose 107 65 - 140 mg/dL    Calcium 9.1 8.4 - 10.2 mg/dL    AST 22 13 - 39 U/L    ALT 19 7 - 52 U/L    Alkaline Phosphatase 69 34 - 104 U/L    Total Protein 6.1 (L) 6.4 - 8.4 g/dL    Albumin 3.5 3.5 - 5.0 g/dL    Total Bilirubin 0.80 0.20 - 1.00 mg/dL    eGFR 77 ml/min/1.73sq m   CBC and differential    Collection Time: 07/15/24  6:22 AM   Result Value Ref Range    WBC 7.74 4.31 - 10.16 Thousand/uL    RBC 4.50 3.81 - 5.12 Million/uL    Hemoglobin 13.0 11.5 - 15.4 g/dL    Hematocrit 43.0 34.8 - 46.1 %    MCV 96 82 - 98 fL    MCH 28.9 26.8 - 34.3 pg    MCHC 30.2 (L) 31.4 - 37.4 g/dL    RDW 17.1 (H) 11.6 - 15.1 %    MPV 10.3 8.9 - 12.7 fL    Platelets 274 149 - 390 Thousands/uL    nRBC 0 /100 WBCs    Segmented % 56 43 - 75 %    Immature Grans % 0 0 - 2 %    Lymphocytes % 30 14 - 44 %    Monocytes % 13 (H) 4 - 12 %    Eosinophils Relative 0 0 - 6 %    Basophils  Relative 1 0 - 1 %    Absolute Neutrophils 4.32 1.85 - 7.62 Thousands/µL    Absolute Immature Grans 0.03 0.00 - 0.20 Thousand/uL    Absolute Lymphocytes 2.30 0.60 - 4.47 Thousands/µL    Absolute Monocytes 0.98 0.17 - 1.22 Thousand/µL    Eosinophils Absolute 0.00 0.00 - 0.61 Thousand/µL    Basophils Absolute 0.11 (H) 0.00 - 0.10 Thousands/µL   CK    Collection Time: 07/15/24  6:22 AM   Result Value Ref Range    Total CK 85 26 - 192 U/L   C-reactive protein    Collection Time: 07/15/24  6:22 AM   Result Value Ref Range    CRP 6.6 (H) <3.0 mg/L   Phosphorus    Collection Time: 07/15/24  6:22 AM   Result Value Ref Range    Phosphorus 4.2 2.7 - 4.5 mg/dL   Magnesium    Collection Time: 07/15/24  6:22 AM   Result Value Ref Range    Magnesium 2.1 1.9 - 2.7 mg/dL   Fingerstick Glucose (POCT)    Collection Time: 07/15/24 10:49 AM   Result Value Ref Range    POC Glucose 156 (H) 65 - 140 mg/dl   Fingerstick Glucose (POCT)    Collection Time: 07/15/24  4:08 PM   Result Value Ref Range    POC Glucose 118 65 - 140 mg/dl   Fingerstick Glucose (POCT)    Collection Time: 07/15/24  8:18 PM   Result Value Ref Range    POC Glucose 107 65 - 140 mg/dl   Basic metabolic panel    Collection Time: 07/16/24  5:27 AM   Result Value Ref Range    Sodium 145 135 - 147 mmol/L    Potassium 3.8 3.5 - 5.3 mmol/L    Chloride 106 96 - 108 mmol/L    CO2 30 21 - 32 mmol/L    ANION GAP 9 4 - 13 mmol/L    BUN 16 5 - 25 mg/dL    Creatinine 0.79 0.60 - 1.30 mg/dL    Glucose 115 65 - 140 mg/dL    Calcium 9.2 8.4 - 10.2 mg/dL    eGFR 83 ml/min/1.73sq m   Magnesium    Collection Time: 07/16/24  5:27 AM   Result Value Ref Range    Magnesium 2.1 1.9 - 2.7 mg/dL   Fingerstick Glucose (POCT)    Collection Time: 07/16/24  6:00 AM   Result Value Ref Range    POC Glucose 104 65 - 140 mg/dl   Fingerstick Glucose (POCT)    Collection Time: 07/16/24 11:06 AM   Result Value Ref Range    POC Glucose 108 65 - 140 mg/dl        This note was not shared with the patient due  to reasonable likelihood of causing patient harm        Diandra Latham DO, Psychiatry Resident, PGY-1  Department of Psychiatry and Behavioral Health  [facility]

## 2024-07-16 NOTE — ASSESSMENT & PLAN NOTE
Patient's blood pressure has been elevated due to inconsistent oral intake of pills  Started on clonidine patch over the weekend  Increase carvedilol to 12.5 twice daily  Continue hydralazine as needed

## 2024-07-16 NOTE — ASSESSMENT & PLAN NOTE
Hypernatremia secondary to free water deficit.    Initially plan was to transfer to medical service for placement of NG tube and start tube feeds/free water.  She has been having intermittent oral intake.  Will need to monitor over 24 hours to see how consistent much of her meals she is eating  Will discontinue IV fluids at this time and monitor BMP

## 2024-07-17 LAB
AMPAR1 AB CSF QL CBA IFA: NEGATIVE
AMPAR2 IGG CSF QL CBA IFA: NEGATIVE
AMPHIPHYSIN AB CSF QL IF: NEGATIVE
ANION GAP SERPL CALCULATED.3IONS-SCNC: 10 MMOL/L (ref 4–13)
B BURGDOR IGG CSF IA-ACNC: < 0.08 INDEX
B BURGDOR IGM CSF IA-ACNC: < 0.06 INDEX
BUN SERPL-MCNC: 18 MG/DL (ref 5–25)
CALCIUM SERPL-MCNC: 8.5 MG/DL (ref 8.4–10.2)
CASPR2 AB CSF QL CBA IFA: NEGATIVE
CHLORIDE SERPL-SCNC: 109 MMOL/L (ref 96–108)
CO2 SERPL-SCNC: 25 MMOL/L (ref 21–32)
CREAT SERPL-MCNC: 0.72 MG/DL (ref 0.6–1.3)
CV2 AB CSF QL IF: NEGATIVE
DPPX IGG CSF QL IF: NEGATIVE
GABABR IGG CSF QL CBA IFA: NEGATIVE
GAD65 AB CSF IA-SCNC: NEGATIVE NMOL/L
GFR SERPL CREATININE-BSD FRML MDRD: 93 ML/MIN/1.73SQ M
GLIAL NUC TYPE 1 AB CSF QL IF: NEGATIVE
GLUCOSE SERPL-MCNC: 104 MG/DL (ref 65–140)
GLUCOSE SERPL-MCNC: 112 MG/DL (ref 65–140)
GLUCOSE SERPL-MCNC: 132 MG/DL (ref 65–140)
GLUCOSE SERPL-MCNC: 183 MG/DL (ref 65–140)
GLUCOSE SERPL-MCNC: 76 MG/DL (ref 65–140)
HU1 AB CSF QL IF: NEGATIVE
HU2 AB CSF QL IF: NEGATIVE
HU3 AB CSF QL IF: NEGATIVE
IGLON5 IGG CSF QL CBA IFA: NEGATIVE
IMP & REVIEW OF LAB RESULTS: NEGATIVE
IP3R 1 IGG CSF QL IF: NEGATIVE
LGI1 AB CSF QL CBA IFA: NEGATIVE
MA+TA AB CSF QL IF: NEGATIVE
MAGNESIUM SERPL-MCNC: 2.2 MG/DL (ref 1.9–2.7)
MGLUR1 IGG CSF QL CBA IFA: NEGATIVE
NMDAR IGG CSF QL IF: NEGATIVE
PCA-2 AB CSF QL IF: NEGATIVE
PCA-TR AB CSF QL CBA IFA: NEGATIVE
PCA-TR AB CSF QL IF: NEGATIVE
POTASSIUM SERPL-SCNC: 4.3 MMOL/L (ref 3.5–5.3)
PURKINJE CELLS AB CSF QL IF: NEGATIVE
SODIUM SERPL-SCNC: 144 MMOL/L (ref 135–147)
ZIC4 ANTIBODY, CSF: NEGATIVE

## 2024-07-17 PROCEDURE — 99232 SBSQ HOSP IP/OBS MODERATE 35: CPT | Performed by: INTERNAL MEDICINE

## 2024-07-17 PROCEDURE — 80048 BASIC METABOLIC PNL TOTAL CA: CPT | Performed by: INTERNAL MEDICINE

## 2024-07-17 PROCEDURE — 82948 REAGENT STRIP/BLOOD GLUCOSE: CPT

## 2024-07-17 PROCEDURE — 97167 OT EVAL HIGH COMPLEX 60 MIN: CPT

## 2024-07-17 PROCEDURE — 99232 SBSQ HOSP IP/OBS MODERATE 35: CPT | Performed by: PSYCHIATRY & NEUROLOGY

## 2024-07-17 PROCEDURE — 83735 ASSAY OF MAGNESIUM: CPT | Performed by: INTERNAL MEDICINE

## 2024-07-17 RX ORDER — LORAZEPAM 2 MG/ML
1 INJECTION INTRAMUSCULAR EVERY 4 HOURS PRN
Status: DISCONTINUED | OUTPATIENT
Start: 2024-07-17 | End: 2024-07-17

## 2024-07-17 RX ORDER — LORAZEPAM 2 MG/ML
1 INJECTION INTRAMUSCULAR EVERY 4 HOURS PRN
Status: DISCONTINUED | OUTPATIENT
Start: 2024-07-17 | End: 2024-07-23 | Stop reason: HOSPADM

## 2024-07-17 RX ORDER — WATER 10 ML/10ML
INJECTION INTRAMUSCULAR; INTRAVENOUS; SUBCUTANEOUS
Status: COMPLETED
Start: 2024-07-17 | End: 2024-07-17

## 2024-07-17 RX ORDER — OLANZAPINE 10 MG/2ML
5 INJECTION, POWDER, FOR SOLUTION INTRAMUSCULAR EVERY 8 HOURS PRN
Status: DISCONTINUED | OUTPATIENT
Start: 2024-07-17 | End: 2024-07-23 | Stop reason: HOSPADM

## 2024-07-17 RX ADMIN — CYANOCOBALAMIN TAB 1000 MCG 1000 MCG: 1000 TAB at 08:21

## 2024-07-17 RX ADMIN — FAMOTIDINE 20 MG: 40 POWDER, FOR SUSPENSION ORAL at 08:14

## 2024-07-17 RX ADMIN — FLUOXETINE 20 MG: 20 SOLUTION ORAL at 08:14

## 2024-07-17 RX ADMIN — CARVEDILOL 12.5 MG: 12.5 TABLET, FILM COATED ORAL at 15:41

## 2024-07-17 RX ADMIN — ENOXAPARIN SODIUM 40 MG: 40 INJECTION SUBCUTANEOUS at 08:12

## 2024-07-17 RX ADMIN — WATER 10 ML: 1 INJECTION, SOLUTION INTRAMUSCULAR; INTRAVENOUS; SUBCUTANEOUS at 13:03

## 2024-07-17 RX ADMIN — HYDRALAZINE HYDROCHLORIDE 10 MG: 20 INJECTION, SOLUTION INTRAMUSCULAR; INTRAVENOUS at 08:10

## 2024-07-17 RX ADMIN — Medication 15 ML: at 08:14

## 2024-07-17 RX ADMIN — FAMOTIDINE 20 MG: 40 POWDER, FOR SUSPENSION ORAL at 18:14

## 2024-07-17 RX ADMIN — LORAZEPAM 1 MG: 2 INJECTION INTRAMUSCULAR; INTRAVENOUS at 18:13

## 2024-07-17 RX ADMIN — LORAZEPAM 1 MG: 2 INJECTION INTRAMUSCULAR; INTRAVENOUS at 21:57

## 2024-07-17 RX ADMIN — DOCUSATE SODIUM 100 MG: 100 CAPSULE, LIQUID FILLED ORAL at 18:14

## 2024-07-17 RX ADMIN — CARVEDILOL 12.5 MG: 12.5 TABLET, FILM COATED ORAL at 08:21

## 2024-07-17 RX ADMIN — LORAZEPAM 1 MG: 1 TABLET ORAL at 08:14

## 2024-07-17 RX ADMIN — INSULIN LISPRO 1 UNITS: 100 INJECTION, SOLUTION INTRAVENOUS; SUBCUTANEOUS at 11:12

## 2024-07-17 RX ADMIN — CLOZAPINE 50 MG: 25 TABLET ORAL at 21:57

## 2024-07-17 RX ADMIN — NICOTINE 1 PATCH: 7 PATCH, EXTENDED RELEASE TRANSDERMAL at 08:13

## 2024-07-17 RX ADMIN — OLANZAPINE 5 MG: 10 INJECTION, POWDER, FOR SOLUTION INTRAMUSCULAR at 12:59

## 2024-07-17 RX ADMIN — DOCUSATE SODIUM 100 MG: 100 CAPSULE, LIQUID FILLED ORAL at 08:14

## 2024-07-17 RX ADMIN — LORAZEPAM 1 MG: 2 INJECTION INTRAMUSCULAR; INTRAVENOUS at 11:09

## 2024-07-17 RX ADMIN — ATORVASTATIN CALCIUM 10 MG: 10 TABLET, FILM COATED ORAL at 08:14

## 2024-07-17 NOTE — ASSESSMENT & PLAN NOTE
Lab Results   Component Value Date    HGBA1C 6.4 (H) 05/03/2024     Recent Labs     07/16/24  1549 07/16/24 2025 07/17/24  0545 07/17/24  0940   POCGLU 108 96 132 183*       Prior to admission on metformin.  Placed on sliding scale insulin during medical hospitalization  Blood sugars adequately controlled

## 2024-07-17 NOTE — NURSING NOTE
Pt was not interactive with staff until 0400. Pt was awake most of the night but was resting in bed. Pt refused PO intake until 0400. Pt then ate half a cup of pudding and roughly 200 mL of fluids. Virtual Medsitter in room, plan of care ongoing.

## 2024-07-17 NOTE — ASSESSMENT & PLAN NOTE
Hypernatremia secondary to free water deficit.    Initially plan was to transfer to medical service for placement of NG tube and start tube feeds/free water.  She has been having intermittent oral intake.  In 24 hours was tolerating approx 25% of meals which is an improvement but not at goal. Will continue to monitor intake. Hopeful for improvement with improvement of psychiatric condition.   Will discontinue IV fluids at this time and monitor BMP

## 2024-07-17 NOTE — ASSESSMENT & PLAN NOTE
History of reactive airway disease diabetes hypertension and mood disorder transferred from [facility] for medical optimization.  7/2/2024: Admitted to [facility].  7/3/2024: Transferred to medical service for concerns of NMS.  Transferred to [facility] for formal neurology evaluation.  MRI negative.  LP without evidence of infection.  Paraneoplastic panel negative.  7/11/2024: Transferred back to [facility] but has had no oral intake and refusing to take medications.  Reviewed outpatient records.  Previously on buspirone 10 mg twice daily, alprazolam XR 2 mg in the morning and 1 mg at bedtime, clozapine 200 mg in the morning and 300 mg at bedtime, paroxetine 60 mg daily  Currently on scheduled ativan with slow titration/resumption of Clozaril which patient appears to be tolerating. Being managed by inpatient psychiatry    Still with inconsistent oral intake and labile BP  Once she has consistent oral intake okay for transfer back to psychiatry service

## 2024-07-17 NOTE — PROGRESS NOTES
[facility]  Progress Note  Name: Meli Nowak I  MRN: [id number]  Unit/Bed#: 7T [facility] 702-01 I Date of Admission: 7/13/2024   Date of Service: 7/17/2024 I Hospital Day: 4    Assessment & Plan   * Schizoaffective disorder, bipolar type (HCC)  Assessment & Plan  History of reactive airway disease diabetes hypertension and mood disorder transferred from [facility] for medical optimization.  7/2/2024: Admitted to [facility].  7/3/2024: Transferred to medical service for concerns of NMS.  Transferred to [facility] for formal neurology evaluation.  MRI negative.  LP without evidence of infection.  Paraneoplastic panel negative.  7/11/2024: Transferred back to [facility] but has had no oral intake and refusing to take medications.  Reviewed outpatient records.  Previously on buspirone 10 mg twice daily, alprazolam XR 2 mg in the morning and 1 mg at bedtime, clozapine 200 mg in the morning and 300 mg at bedtime, paroxetine 60 mg daily  Currently on scheduled ativan with slow titration/resumption of Clozaril which patient appears to be tolerating. Being managed by inpatient psychiatry    Still with inconsistent oral intake and labile BP  Once she has consistent oral intake okay for transfer back to psychiatry service    Essential (primary) hypertension  Assessment & Plan  Patient's blood pressure has been elevated due to inconsistent oral intake of pills  Started on clonidine patch over the weekend  Continue carvedilol to 12.5 twice daily  Continue hydralazine as needed    Esophageal reflux  Assessment & Plan  Continue Pepcid    Hypernatremia  Assessment & Plan  Hypernatremia secondary to free water deficit.    Initially plan was to transfer to medical service for placement of NG tube and start tube feeds/free water.  She has been having intermittent oral intake.  In 24 hours was tolerating approx 25% of meals which is an improvement but not at goal. Will continue to monitor intake. Hopeful for improvement  with improvement of psychiatric condition.   Off IVF      Reactive airway disease  Assessment & Plan  Continue with prior to admission on Arnuity Ellipta.  No signs of acute exacerbation    Hyperlipidemia  Assessment & Plan  Continue prior to admission atorvastatin    Type 2 diabetes mellitus (HCC)  Assessment & Plan  Lab Results   Component Value Date    HGBA1C 6.4 (H) 05/03/2024     Recent Labs     07/16/24  1549 07/16/24  2025 07/17/24  0545 07/17/24  0940   POCGLU 108 96 132 183*       Prior to admission on metformin.  Placed on sliding scale insulin during medical hospitalization  Blood sugars adequately controlled         VTE Pharmacologic Prophylaxis: lovenox     Patient Centered Rounds:  Patient care rounds were performed with nursing    Discussions with Specialists or Other Care Team Provider: Independently discussed care with Psychiatry     Called daughter for medical update approx 155 with no answer    Time Spent for Care: I have spent a total time of 45 minutes on 07/17/24 in caring for this patient including Diagnostic results, Risks and benefits of tx options, Instructions for management, Patient and family education, Importance of tx compliance, Risk factor reductions, Counseling / Coordination of care, Documenting in the medical record, Reviewing / ordering tests, medicine, procedures  , and Communicating with other healthcare professionals .      Current Length of Stay: 4 day(s)    Current Patient Status: Inpatient   Certification Statement: The patient will continue to require additional inpatient hospital stay due to need for management of catatonia, poor oral intake, labile blood pressures     Discharge Plan: DC to psych when bp relatively controlled and patient's are having adequate oral intake     Code Status: Level 1 - Full Code      Subjective:   Patient seen and evaluated at bedside. Writhing in bed. Minimally interactive. Refused physical exam.     Objective:     Vitals:   Temp (24hrs),  Av.9 °F (36.6 °C), Min:97.4 °F (36.3 °C), Max:98.2 °F (36.8 °C)    Temp:  [97.4 °F (36.3 °C)-98.2 °F (36.8 °C)] 97.9 °F (36.6 °C)  HR:  [] 135  Resp:  [18-20] 18  BP: (138-198)/() 162/108  SpO2:  [90 %-94 %] 92 %  Body mass index is 37.67 kg/m².     Input and Output Summary (last 24 hours):       Intake/Output Summary (Last 24 hours) at 2024 1223  Last data filed at 2024 0851  Gross per 24 hour   Intake 620 ml   Output --   Net 620 ml       Physical Exam:     Physical Exam  Constitutional:       General: She is not in acute distress.     Appearance: She is well-developed. She is ill-appearing. She is not diaphoretic.   HENT:      Head: Normocephalic and atraumatic.   Eyes:      General:         Right eye: No discharge.         Left eye: No discharge.      Conjunctiva/sclera: Conjunctivae normal.   Pulmonary:      Effort: No respiratory distress.   Musculoskeletal:         General: No deformity.   Skin:     Findings: No erythema or rash.   Neurological:      Mental Status: She is alert.      Comments: Moving all 4 extremities equally and spontaneously    Psychiatric:      Comments: Paranoid, incoherent          Additional Data:     Labs: I have reviewed pertinent results     Results from last 7 days   Lab Units 07/15/24  0622   WBC Thousand/uL 7.74   HEMOGLOBIN g/dL 13.0   HEMATOCRIT % 43.0   PLATELETS Thousands/uL 274   SEGS PCT % 56   LYMPHO PCT % 30   MONO PCT % 13*   EOS PCT % 0     Results from last 7 days   Lab Units 24  0437 24  0527 07/15/24  0622   SODIUM mmol/L 144   < > 147   POTASSIUM mmol/L 4.3   < > 3.5   CHLORIDE mmol/L 109*   < > 110*   CO2 mmol/L 25   < > 28   BUN mg/dL 18   < > 18   CREATININE mg/dL 0.72   < > 0.84   ANION GAP mmol/L 10   < > 9   CALCIUM mg/dL 8.5   < > 9.1   ALBUMIN g/dL  --   --  3.5   TOTAL BILIRUBIN mg/dL  --   --  0.80   ALK PHOS U/L  --   --  69   ALT U/L  --   --  19   AST U/L  --   --  22   GLUCOSE RANDOM mg/dL 76   < > 107    < > =  values in this interval not displayed.         Results from last 7 days   Lab Units 07/17/24  0940 07/17/24  0545 07/16/24 2025 07/16/24  1549 07/16/24  1106 07/16/24  0600 07/15/24  2018 07/15/24  1608 07/15/24  1049 07/15/24  0619 07/14/24 2028 07/14/24  1530   POC GLUCOSE mg/dl 183* 132 96 108 108 104 107 118 156* 116 114 94                 Imaging: I have reviewed pertinent imaging       Recent Cultures (last 7 days):           Last 24 Hours Medication List:   Current Facility-Administered Medications   Medication Dose Route Frequency Provider Last Rate    acetaminophen  650 mg Per NG Tube Q4H PRN Travon Flores, DO      atorvastatin  10 mg Oral Daily Cristiano Cruz DO      bisacodyl  10 mg Rectal Daily PRN Cristiano Cruz DO      carvedilol  12.5 mg Oral BID With Meals Cristiano Cruz DO      cloNIDine  0.1 mg Transdermal Weekly Travon Flores, DO      cloZAPine  50 mg Oral HS Cristiano Cruz DO      cyanocobalamin  1,000 mcg Oral Daily Travon Mark, DO      docusate sodium  100 mg Oral BID Jordan C Holter, DO      enoxaparin  40 mg Subcutaneous Daily Travon Mark, DO      famotidine  20 mg Oral BID Travon Mark, DO      FLUoxetine  20 mg Oral Daily Travon Mark, DO      fluticasone  1 puff Inhalation Daily Travon Flores, DO      hydrALAZINE  10 mg Intravenous Q4H PRN Travon Flores, DO      insulin lispro  1-5 Units Subcutaneous 4x Daily (AC & HS) Travon Flores, DO      LORazepam  1 mg Oral 4x Daily Travon Mark, DO      Or    LORazepam  1 mg Intravenous 4x Daily Travon Mark, DO      Multivitamin  15 mL Oral Daily Travon Mark, DO      nicotine  1 patch Transdermal Daily Travon Mark, DO      ondansetron  4 mg Intravenous Q4H PRN Travon Flores, DO          Today, Patient Was Seen By: Cristiano Cruz DO    ** Please Note: Dictation voice to text software may have been used in the creation of this document. **

## 2024-07-17 NOTE — ASSESSMENT & PLAN NOTE
Patient's blood pressure has been elevated due to inconsistent oral intake of pills  Started on clonidine patch over the weekend  Continue carvedilol to 12.5 twice daily  Continue hydralazine as needed

## 2024-07-17 NOTE — PLAN OF CARE
Problem: PAIN - ADULT  Goal: Verbalizes/displays adequate comfort level or baseline comfort level  Description: Interventions:  - Encourage patient to monitor pain and request assistance  - Assess pain using appropriate pain scale  - Administer analgesics based on type and severity of pain and evaluate response  - Implement non-pharmacological measures as appropriate and evaluate response  - Consider cultural and social influences on pain and pain management  - Notify physician/advanced practitioner if interventions unsuccessful or patient reports new pain  Outcome: Progressing     Problem: INFECTION - ADULT  Goal: Absence or prevention of progression during hospitalization  Description: INTERVENTIONS:  - Assess and monitor for signs and symptoms of infection  - Monitor lab/diagnostic results  - Monitor all insertion sites, i.e. indwelling lines, tubes, and drains  - Monitor endotracheal if appropriate and nasal secretions for changes in amount and color  - [place] appropriate cooling/warming therapies per order  - Administer medications as ordered  - Instruct and encourage patient and family to use good hand hygiene technique  - Identify and instruct in appropriate isolation precautions for identified infection/condition  Outcome: Progressing  Goal: Absence of fever/infection during neutropenic period  Description: INTERVENTIONS:  - Monitor WBC    Outcome: Progressing     Problem: SAFETY ADULT  Goal: Patient will remain free of falls  Description: INTERVENTIONS:  - Educate patient/family on patient safety including physical limitations  - Instruct patient to call for assistance with activity   - Consult OT/PT to assist with strengthening/mobility   - Keep Call bell within reach  - Keep bed low and locked with side rails adjusted as appropriate  - Keep care items and personal belongings within reach  - Initiate and maintain comfort rounds  - Make Fall Risk Sign visible to staff  - Offer Toileting every  Hours,  in advance of need  - Initiate/Maintain alarm  - Obtain necessary fall risk management equipment:   - Apply yellow socks and bracelet for high fall risk patients  - Consider moving patient to room near nurses station  Outcome: Progressing  Goal: Maintain or return to baseline ADL function  Description: INTERVENTIONS:  -  Assess patient's ability to carry out ADLs; assess patient's baseline for ADL function and identify physical deficits which impact ability to perform ADLs (bathing, care of mouth/teeth, toileting, grooming, dressing, etc.)  - Assess/evaluate cause of self-care deficits   - Assess range of motion  - Assess patient's mobility; develop plan if impaired  - Assess patient's need for assistive devices and provide as appropriate  - Encourage maximum independence but intervene and supervise when necessary  - Involve family in performance of ADLs  - Assess for home care needs following discharge   - Consider OT consult to assist with ADL evaluation and planning for discharge  - Provide patient education as appropriate  Outcome: Progressing  Goal: Maintains/Returns to pre admission functional level  Description: INTERVENTIONS:  - Perform AM-PAC 6 Click Basic Mobility/ Daily Activity assessment daily.  - Set and communicate daily mobility goal to care team and patient/family/caregiver.   - Collaborate with rehabilitation services on mobility goals if consulted  - Perform Range of Motion  times a day.  - Reposition patient every  hours.  - Dangle patient  times a day  - Stand patient  times a day  - Ambulate patient  times a day  - Out of bed to chaiR times a day   - Out of bed for meals  times a day  - Out of bed for toileting  - Record patient progress and toleration of activity level   Outcome: Progressing     Problem: DISCHARGE PLANNING  Goal: Discharge to home or other facility with appropriate resources  Description: INTERVENTIONS:  - Identify barriers to discharge w/patient and caregiver  - Arrange for  needed discharge resources and transportation as appropriate  - Identify discharge learning needs (meds, wound care, etc.)  - Arrange for interpretive services to assist at discharge as needed  - Refer to Case Management Department for coordinating discharge planning if the patient needs post-hospital services based on physician/advanced practitioner order or complex needs related to functional status, cognitive ability, or social support system  Outcome: Progressing     Problem: Knowledge Deficit  Goal: Patient/family/caregiver demonstrates understanding of disease process, treatment plan, medications, and discharge instructions  Description: Complete learning assessment and assess knowledge base.  Interventions:  - Provide teaching at level of understanding  - Provide teaching via preferred learning methods  Outcome: Progressing     Problem: Prexisting or High Potential for Compromised Skin Integrity  Goal: Skin integrity is maintained or improved  Description: INTERVENTIONS:  - Identify patients at risk for skin breakdown  - Assess and monitor skin integrity  - Assess and monitor nutrition and hydration status  - Monitor labs   - Assess for incontinence   - Turn and reposition patient  - Assist with mobility/ambulation  - Relieve pressure over bony prominences  - Avoid friction and shearing  - Provide appropriate hygiene as needed including keeping skin clean and dry  - Evaluate need for skin moisturizer/barrier cream  - Collaborate with interdisciplinary team   - Patient/family teaching  - Consider wound care consult   Outcome: Progressing     Problem: Nutrition/Hydration-ADULT  Goal: Nutrient/Hydration intake appropriate for improving, restoring or maintaining nutritional needs  Description: Monitor and assess patient's nutrition/hydration status for malnutrition. Collaborate with interdisciplinary team and initiate plan and interventions as ordered.  Monitor patient's weight and dietary intake as ordered or  per policy. Utilize nutrition screening tool and intervene as necessary. Determine patient's food preferences and provide high-protein, high-caloric foods as appropriate.     INTERVENTIONS:  - Monitor oral intake, urinary output, labs, and treatment plans  - Assess nutrition and hydration status and recommend course of action  - Evaluate amount of meals eaten  - Assist patient with eating if necessary   - Allow adequate time for meals  - Recommend/ encourage appropriate diets, oral nutritional supplements, and vitamin/mineral supplements  - Order, calculate, and assess calorie counts as needed  - Recommend, monitor, and adjust tube feedings and TPN/PPN based on assessed needs  - Assess need for intravenous fluids  - Provide specific nutrition/hydration education as appropriate  - Include patient/family/caregiver in decisions related to nutrition  Outcome: Progressing     Problem: METABOLIC, FLUID AND ELECTROLYTES - ADULT  Goal: Electrolytes maintained within normal limits  Description: INTERVENTIONS:  - Monitor labs and assess patient for signs and symptoms of electrolyte imbalances  - Administer electrolyte replacement as ordered  - Monitor response to electrolyte replacements, including repeat lab results as appropriate  - Instruct patient on fluid and nutrition as appropriate  Outcome: Progressing  Goal: Glucose maintained within target range  Description: INTERVENTIONS:  - Monitor Blood Glucose as ordered  - Assess for signs and symptoms of hyperglycemia and hypoglycemia  - Administer ordered medications to maintain glucose within target range  - Assess nutritional intake and initiate nutrition service referral as needed  Outcome: Progressing     Problem: MOBILITY - ADULT  Goal: Maintain or return to baseline ADL function  Description: INTERVENTIONS:  -  Assess patient's ability to carry out ADLs; assess patient's baseline for ADL function and identify physical deficits which impact ability to perform ADLs  (bathing, care of mouth/teeth, toileting, grooming, dressing, etc.)  - Assess/evaluate cause of self-care deficits   - Assess range of motion  - Assess patient's mobility; develop plan if impaired  - Assess patient's need for assistive devices and provide as appropriate  - Encourage maximum independence but intervene and supervise when necessary  - Involve family in performance of ADLs  - Assess for home care needs following discharge   - Consider OT consult to assist with ADL evaluation and planning for discharge  - Provide patient education as appropriate  Outcome: Progressing  Goal: Maintains/Returns to pre admission functional level  Description: INTERVENTIONS:  - Perform AM-PAC 6 Click Basic Mobility/ Daily Activity assessment daily.  - Set and communicate daily mobility goal to care team and patient/family/caregiver.   - Collaborate with rehabilitation services on mobility goals if consulted  - Perform Range of Motion  times a day.  - Reposition patient every  hours.  - Dangle patient  times a day  - Stand patient  times a day  - Ambulate patient  times a day  - Out of bed to chair  times a day   - Out of bed for meals  times a day  - Out of bed for toileting  - Record patient progress and toleration of activity level   Outcome: Progressing

## 2024-07-17 NOTE — PLAN OF CARE
Problem: SAFETY ADULT  Goal: Patient will remain free of falls  Description: INTERVENTIONS:  - Educate patient/family on patient safety including physical limitations  - Instruct patient to call for assistance with activity   - Consult OT/PT to assist with strengthening/mobility   - Keep Call bell within reach  - Keep bed low and locked with side rails adjusted as appropriate  - Keep care items and personal belongings within reach  - Initiate and maintain comfort rounds  - Make Fall Risk Sign visible to staff  - Apply yellow socks and bracelet for high fall risk patients  - Consider moving patient to room near nurses station  Outcome: Progressing  Goal: Maintain or return to baseline ADL function  Description: INTERVENTIONS:  -  Assess patient's ability to carry out ADLs; assess patient's baseline for ADL function and identify physical deficits which impact ability to perform ADLs (bathing, care of mouth/teeth, toileting, grooming, dressing, etc.)  - Assess/evaluate cause of self-care deficits   - Assess range of motion  - Assess patient's mobility; develop plan if impaired  - Assess patient's need for assistive devices and provide as appropriate  - Encourage maximum independence but intervene and supervise when necessary  - Involve family in performance of ADLs  - Assess for home care needs following discharge   - Consider OT consult to assist with ADL evaluation and planning for discharge  - Provide patient education as appropriate  Outcome: Progressing  Goal: Maintains/Returns to pre admission functional level  Description: INTERVENTIONS:  - Perform AM-PAC 6 Click Basic Mobility/ Daily Activity assessment daily.  - Set and communicate daily mobility goal to care team and patient/family/caregiver.   - Collaborate with rehabilitation services on mobility goals if consulted  - Out of bed for toileting  - Record patient progress and toleration of activity level   Outcome: Progressing     Problem:  Nutrition/Hydration-ADULT  Goal: Nutrient/Hydration intake appropriate for improving, restoring or maintaining nutritional needs  Description: Monitor and assess patient's nutrition/hydration status for malnutrition. Collaborate with interdisciplinary team and initiate plan and interventions as ordered.  Monitor patient's weight and dietary intake as ordered or per policy. Utilize nutrition screening tool and intervene as necessary. Determine patient's food preferences and provide high-protein, high-caloric foods as appropriate.     INTERVENTIONS:  - Monitor oral intake, urinary output, labs, and treatment plans  - Assess nutrition and hydration status and recommend course of action  - Evaluate amount of meals eaten  - Assist patient with eating if necessary   - Allow adequate time for meals  - Recommend/ encourage appropriate diets, oral nutritional supplements, and vitamin/mineral supplements  - Order, calculate, and assess calorie counts as needed  - Recommend, monitor, and adjust tube feedings and TPN/PPN based on assessed needs  - Assess need for intravenous fluids  - Provide specific nutrition/hydration education as appropriate  - Include patient/family/caregiver in decisions related to nutrition  Outcome: Progressing     Problem: METABOLIC, FLUID AND ELECTROLYTES - ADULT  Goal: Electrolytes maintained within normal limits  Description: INTERVENTIONS:  - Monitor labs and assess patient for signs and symptoms of electrolyte imbalances  - Administer electrolyte replacement as ordered  - Monitor response to electrolyte replacements, including repeat lab results as appropriate  - Instruct patient on fluid and nutrition as appropriate  Outcome: Progressing  Goal: Glucose maintained within target range  Description: INTERVENTIONS:  - Monitor Blood Glucose as ordered  - Assess for signs and symptoms of hyperglycemia and hypoglycemia  - Administer ordered medications to maintain glucose within target range  - Assess  nutritional intake and initiate nutrition service referral as needed  Outcome: Progressing     Problem: Knowledge Deficit  Goal: Patient/family/caregiver demonstrates understanding of disease process, treatment plan, medications, and discharge instructions  Description: Complete learning assessment and assess knowledge base.  Interventions:  - Provide teaching at level of understanding  - Provide teaching via preferred learning methods  Outcome: Progressing     Problem: DISCHARGE PLANNING  Goal: Discharge to home or other facility with appropriate resources  Description: INTERVENTIONS:  - Identify barriers to discharge w/patient and caregiver  - Arrange for needed discharge resources and transportation as appropriate  - Identify discharge learning needs (meds, wound care, etc.)  - Arrange for interpretive services to assist at discharge as needed  - Refer to Case Management Department for coordinating discharge planning if the patient needs post-hospital services based on physician/advanced practitioner order or complex needs related to functional status, cognitive ability, or social support system  Outcome: Progressing

## 2024-07-17 NOTE — PROGRESS NOTES
PSYCHIATRY CONSULT SERVICE  PROGRESS NOTE    Meli Nowak 59 y.o. female MRN: [id number]  Unit/Bed#: 7T [facility] 702-01 Encounter: [id number]     ASSESSMENT / PLAN     Meli Nowak is a 59 y.o. female, with history of schizoaffective disorder, who initially presented to [facility] inpatient adult behavioral health unit on 7/3/2024 for psychosis. Patient has been transferred to the medical floor previously due to AMS, presenting with symptoms of atypical NMS, likely in the setting of abrupt discontinuation of Clozaril. Patient returned to [facility] behavioral health unit after negative workup for neurologic causes. Patient with poor oral intake, dehydration, elevated BP, was sent back to the medical floor on 7/13/2024. Currently, patient remains mute, unable or willing to participate in evaluation, appearing paranoid. However, her oral intake appears to be improving. Will continue current psychotropic medication regimen at this time, with plan to titrate Clozaril upward as necessary.    Principal Psychiatric Problem:  Schizoaffective disorder, acute on chronic (HCC), low suspicion for NMS at this time.      Principal Problem:    Schizoaffective disorder, bipolar type (HCC)  Active Problems:    Type 2 diabetes mellitus (HCC)    Hyperlipidemia    Reactive airway disease    Hypernatremia    Esophageal reflux    Hypokalemia    Essential (primary) hypertension      RECOMMENDED TREATMENT     Discussed plan with primary team as follows:  Hospital labs reviewed.  Collaborate with collaterals for further assessment and disposition as indicated.  Patient currently has signed 303, expiring 7/25, will return to inpatient behavioral health unit once medically stable.  Recommend no changes to psychotropic medications at this time.   Continue medical management per primary team.  Observation level: Virtual monitoring  Psychiatry will continue to follow as needed. Please contact our service via [employer] Secure Chat with  any additional questions or concerns. If contacting after hours, please call or Epic Secure Chat the on-call team (GOVINDJOANNA: [phone]) with any questions or concerns.         SUBJECTIVE     Patient was seen and evaluated for continuity of care. Meli appears more awake and alert. She is slouched over in bed, holding her hands over her face, appears frightened vs paranoid. Patient is still nonverbal, not answering questions, unable or willing to participate in evaluation. Her oral intake is improving, she reportedly ate 25-50% of her breakfast this morning and took all medications.        Psychiatric Review of Systems:  Behavior over the last 24 hours:  improving  Sleep:  per RN, awake most of the night  Appetite: improving per RN, ate a banana and muffin for breakfast   Medication side effects: none verbalized  ROS: Review of systems could not be obtained due to patient factors    OBJECTIVE     Vital signs in last 24 hours:  Temp:  [97.4 °F (36.3 °C)-98.2 °F (36.8 °C)] 97.9 °F (36.6 °C)  HR:  [87-93] 87  Resp:  [18-20] 18  BP: (138-198)/(88-97) 198/97    Mental Status Evaluation:  Appearance:  disheveled, dressed in hospital attire, overweight, facial hair on chin, spots of discoloration on forehead   Behavior:  no eye contact, mute, slouched to the side of the bed, holds hands over eyes   Speech:  mute   Mood:  Unable to assess   Affect:  Unable to assess   Language: unable to assess   Thought Process:  unable to assess   Associations: unable to assess - does not answer   Thought Content:  Unable to assess   Perceptual Disturbances: appears preoccupied, otherwise unable to assess   Risk Potential: Suicidal ideation - unable to assess  Homicidal ideation - unable to assess  Potential for aggression - Not at present   Sensorium:  does not answer   Memory:  Does not answer   Consciousness:  alert and awake   Attention/Concentration: Unable to assess   Intellect: unable to assess   Fund of Knowledge: Unable to assess    Insight:  Unable to assess   Judgment: Unable to assess   Muscle Strength Muscle Tone: Unable to assess  Unable to assess   Gait/Station: Moving all extremities, gait not assessed   Motor Activity: no abnormal movements           ACTIVE MEDICATIONS     Current Medications:  Current Facility-Administered Medications   Medication Dose Route Frequency Provider Last Rate    acetaminophen  650 mg Per NG Tube Q4H PRN Travon Haywoodng, DO      atorvastatin  10 mg Oral Daily Cristiano Cruz, DO      bisacodyl  10 mg Rectal Daily PRN Cristiano Cruz, DO      carvedilol  12.5 mg Oral BID With Meals Cristiano Cruz, DO      cloNIDine  0.1 mg Transdermal Weekly Travon Flores, DO      cloZAPine  50 mg Oral HS Cristiano Cruz, DO      cyanocobalamin  1,000 mcg Oral Daily Travon Flores, DO      docusate sodium  100 mg Oral BID Jordan C Holter, DO      enoxaparin  40 mg Subcutaneous Daily Travon Flores, DO      famotidine  20 mg Oral BID Travon Flores, DO      FLUoxetine  20 mg Oral Daily Travon Flores, DO      fluticasone  1 puff Inhalation Daily Travon Flores, DO      hydrALAZINE  10 mg Intravenous Q4H PRN Travon Flores, DO      insulin lispro  1-5 Units Subcutaneous 4x Daily (AC & HS) Travon Flores, DO      LORazepam  1 mg Oral 4x Daily Travon Flores, DO      Or    LORazepam  1 mg Intravenous 4x Daily Travon Mark, DO      Multivitamin  15 mL Oral Daily Travon Mark, DO      nicotine  1 patch Transdermal Daily Travon Flores, DO      ondansetron  4 mg Intravenous Q4H PRN Travon Flores, DO         Behavioral Health Medications: I have personally reviewed all current active medications. Changes as in plan section above.      ADDITIONAL DATA     EKG Results: I have personally reviewed pertinent reports.  7/13/24:     Radiology Results: I have personally reviewed pertinent reports. I have personally reviewed pertinent films in PACS.  No results found.  No Chest XR results available for this patient.     Laboratory  Results: I have personally reviewed all pertinent laboratory/tests results.  Recent Results (from the past 48 hour(s))   Fingerstick Glucose (POCT)    Collection Time: 07/15/24 10:49 AM   Result Value Ref Range    POC Glucose 156 (H) 65 - 140 mg/dl   Fingerstick Glucose (POCT)    Collection Time: 07/15/24  4:08 PM   Result Value Ref Range    POC Glucose 118 65 - 140 mg/dl   Fingerstick Glucose (POCT)    Collection Time: 07/15/24  8:18 PM   Result Value Ref Range    POC Glucose 107 65 - 140 mg/dl   Basic metabolic panel    Collection Time: 07/16/24  5:27 AM   Result Value Ref Range    Sodium 145 135 - 147 mmol/L    Potassium 3.8 3.5 - 5.3 mmol/L    Chloride 106 96 - 108 mmol/L    CO2 30 21 - 32 mmol/L    ANION GAP 9 4 - 13 mmol/L    BUN 16 5 - 25 mg/dL    Creatinine 0.79 0.60 - 1.30 mg/dL    Glucose 115 65 - 140 mg/dL    Calcium 9.2 8.4 - 10.2 mg/dL    eGFR 83 ml/min/1.73sq m   Magnesium    Collection Time: 07/16/24  5:27 AM   Result Value Ref Range    Magnesium 2.1 1.9 - 2.7 mg/dL   Fingerstick Glucose (POCT)    Collection Time: 07/16/24  6:00 AM   Result Value Ref Range    POC Glucose 104 65 - 140 mg/dl   Fingerstick Glucose (POCT)    Collection Time: 07/16/24 11:06 AM   Result Value Ref Range    POC Glucose 108 65 - 140 mg/dl   Fingerstick Glucose (POCT)    Collection Time: 07/16/24  3:49 PM   Result Value Ref Range    POC Glucose 108 65 - 140 mg/dl   Fingerstick Glucose (POCT)    Collection Time: 07/16/24  8:25 PM   Result Value Ref Range    POC Glucose 96 65 - 140 mg/dl   Basic metabolic panel    Collection Time: 07/17/24  4:37 AM   Result Value Ref Range    Sodium 144 135 - 147 mmol/L    Potassium 4.3 3.5 - 5.3 mmol/L    Chloride 109 (H) 96 - 108 mmol/L    CO2 25 21 - 32 mmol/L    ANION GAP 10 4 - 13 mmol/L    BUN 18 5 - 25 mg/dL    Creatinine 0.72 0.60 - 1.30 mg/dL    Glucose 76 65 - 140 mg/dL    Calcium 8.5 8.4 - 10.2 mg/dL    eGFR 93 ml/min/1.73sq m   Magnesium    Collection Time: 07/17/24  4:37 AM   Result  Value Ref Range    Magnesium 2.2 1.9 - 2.7 mg/dL   Fingerstick Glucose (POCT)    Collection Time: 07/17/24  5:45 AM   Result Value Ref Range    POC Glucose 132 65 - 140 mg/dl        This note was not shared with the patient due to reasonable likelihood of causing patient harm        Diandra Latham DO Psychiatry Resident, PGY-1  Department of Psychiatry and Behavioral Health  [facility]

## 2024-07-18 ENCOUNTER — APPOINTMENT (INPATIENT)
Dept: RADIOLOGY | Facility: HOSPITAL | Age: 59
DRG: 641 | End: 2024-07-18
Payer: MEDICARE

## 2024-07-18 PROBLEM — E87.6 HYPOKALEMIA: Status: RESOLVED | Noted: 2024-07-12 | Resolved: 2024-07-18

## 2024-07-18 LAB
ANION GAP SERPL CALCULATED.3IONS-SCNC: 11 MMOL/L (ref 4–13)
BUN SERPL-MCNC: 24 MG/DL (ref 5–25)
CALCIUM SERPL-MCNC: 9.7 MG/DL (ref 8.4–10.2)
CHLORIDE SERPL-SCNC: 112 MMOL/L (ref 96–108)
CO2 SERPL-SCNC: 29 MMOL/L (ref 21–32)
CREAT SERPL-MCNC: 1.09 MG/DL (ref 0.6–1.3)
ERYTHROCYTE [DISTWIDTH] IN BLOOD BY AUTOMATED COUNT: 17.1 % (ref 11.6–15.1)
GFR SERPL CREATININE-BSD FRML MDRD: 56 ML/MIN/1.73SQ M
GLUCOSE SERPL-MCNC: 113 MG/DL (ref 65–140)
GLUCOSE SERPL-MCNC: 125 MG/DL (ref 65–140)
GLUCOSE SERPL-MCNC: 129 MG/DL (ref 65–140)
GLUCOSE SERPL-MCNC: 82 MG/DL (ref 65–140)
GLUCOSE SERPL-MCNC: 94 MG/DL (ref 65–140)
HCT VFR BLD AUTO: 46.8 % (ref 34.8–46.1)
HGB BLD-MCNC: 14.5 G/DL (ref 11.5–15.4)
MCH RBC QN AUTO: 29.7 PG (ref 26.8–34.3)
MCHC RBC AUTO-ENTMCNC: 31 G/DL (ref 31.4–37.4)
MCV RBC AUTO: 96 FL (ref 82–98)
PLATELET # BLD AUTO: 255 THOUSANDS/UL (ref 149–390)
PMV BLD AUTO: 10.2 FL (ref 8.9–12.7)
POTASSIUM SERPL-SCNC: 4 MMOL/L (ref 3.5–5.3)
RBC # BLD AUTO: 4.89 MILLION/UL (ref 3.81–5.12)
SODIUM SERPL-SCNC: 152 MMOL/L (ref 135–147)
WBC # BLD AUTO: 9.45 THOUSAND/UL (ref 4.31–10.16)

## 2024-07-18 PROCEDURE — G0407 INPT/TELE FOLLOW UP 25: HCPCS | Performed by: STUDENT IN AN ORGANIZED HEALTH CARE EDUCATION/TRAINING PROGRAM

## 2024-07-18 PROCEDURE — 85027 COMPLETE CBC AUTOMATED: CPT | Performed by: INTERNAL MEDICINE

## 2024-07-18 PROCEDURE — 99232 SBSQ HOSP IP/OBS MODERATE 35: CPT | Performed by: INTERNAL MEDICINE

## 2024-07-18 PROCEDURE — 97530 THERAPEUTIC ACTIVITIES: CPT

## 2024-07-18 PROCEDURE — 92526 ORAL FUNCTION THERAPY: CPT

## 2024-07-18 PROCEDURE — 97163 PT EVAL HIGH COMPLEX 45 MIN: CPT

## 2024-07-18 PROCEDURE — 82948 REAGENT STRIP/BLOOD GLUCOSE: CPT

## 2024-07-18 PROCEDURE — 80048 BASIC METABOLIC PNL TOTAL CA: CPT | Performed by: INTERNAL MEDICINE

## 2024-07-18 PROCEDURE — 71045 X-RAY EXAM CHEST 1 VIEW: CPT

## 2024-07-18 RX ORDER — CLOZAPINE 25 MG/1
75 TABLET ORAL
Status: DISCONTINUED | OUTPATIENT
Start: 2024-07-18 | End: 2024-07-22

## 2024-07-18 RX ORDER — DEXTROSE MONOHYDRATE 50 MG/ML
75 INJECTION, SOLUTION INTRAVENOUS CONTINUOUS
Status: DISCONTINUED | OUTPATIENT
Start: 2024-07-18 | End: 2024-07-19

## 2024-07-18 RX ADMIN — DEXTROSE 75 ML/HR: 5 SOLUTION INTRAVENOUS at 08:55

## 2024-07-18 RX ADMIN — LORAZEPAM 1 MG: 1 TABLET ORAL at 08:00

## 2024-07-18 RX ADMIN — CARVEDILOL 12.5 MG: 12.5 TABLET, FILM COATED ORAL at 07:58

## 2024-07-18 RX ADMIN — FAMOTIDINE 20 MG: 40 POWDER, FOR SUSPENSION ORAL at 17:04

## 2024-07-18 RX ADMIN — NICOTINE 1 PATCH: 7 PATCH, EXTENDED RELEASE TRANSDERMAL at 08:00

## 2024-07-18 RX ADMIN — CARVEDILOL 12.5 MG: 12.5 TABLET, FILM COATED ORAL at 17:04

## 2024-07-18 RX ADMIN — LORAZEPAM 1 MG: 1 TABLET ORAL at 22:06

## 2024-07-18 RX ADMIN — FAMOTIDINE 20 MG: 40 POWDER, FOR SUSPENSION ORAL at 08:00

## 2024-07-18 RX ADMIN — DEXTROSE 75 ML/HR: 5 SOLUTION INTRAVENOUS at 17:05

## 2024-07-18 RX ADMIN — ENOXAPARIN SODIUM 40 MG: 40 INJECTION SUBCUTANEOUS at 08:00

## 2024-07-18 RX ADMIN — LORAZEPAM 1 MG: 2 INJECTION INTRAMUSCULAR; INTRAVENOUS at 11:48

## 2024-07-18 RX ADMIN — LORAZEPAM 1 MG: 1 TABLET ORAL at 17:04

## 2024-07-18 RX ADMIN — CLOZAPINE 75 MG: 25 TABLET ORAL at 22:09

## 2024-07-18 RX ADMIN — CYANOCOBALAMIN TAB 1000 MCG 1000 MCG: 1000 TAB at 08:00

## 2024-07-18 RX ADMIN — ATORVASTATIN CALCIUM 10 MG: 10 TABLET, FILM COATED ORAL at 08:00

## 2024-07-18 RX ADMIN — FLUOXETINE 20 MG: 20 SOLUTION ORAL at 08:00

## 2024-07-18 NOTE — NURSING NOTE
Overnight Pt ate 2 pudding cups, 1 cup applesauce, and 1 cup ice cream plus drank 540mL of fluid. Pt did not have any behaviors overnight that required PRN or restraints.

## 2024-07-18 NOTE — PROGRESS NOTES
PSYCHIATRY CONSULT SERVICE  PROGRESS NOTE    Meli Nowak 59 y.o. female MRN: [id number]  Unit/Bed#: 7T [facility] 702-01 Encounter: [id number]     ASSESSMENT / PLAN     Meli Nowak is a 59 y.o. female, with history of schizoaffective disorder, who initially presented to [facility] inpatient adult behavioral health unit for psychosis on 7/3/2024. Patient has been transferred to medical multiple times during her admission and is currently on the medical floor as of 7/13/2024.  Patient appears to be improving with reintroduction of Clozaril, as she is now eating, drinking, and taking medications. Plan to titrate Clozaril to 75 mg QHS tonight. She remains on 303 involuntary admission. 304 petitioned today with hearing tomorrow.    Principal Psychiatric Problem:  Acute on chronic schizoaffective disorder (HCC), less likely NMS vs catatonia      Principal Problem:    Schizoaffective disorder, bipolar type (HCC)  Active Problems:    Type 2 diabetes mellitus (HCC)    Hyperlipidemia    Reactive airway disease    Hypernatremia    Esophageal reflux    Essential (primary) hypertension      RECOMMENDED TREATMENT     Discussed plan with primary team as follows:  Hospital labs reviewed.  Collaborate with collaterals for further assessment and disposition as indicated.  Patient has current 303, 304 petitioned today with hearing tomorrow.  Recommend the following psychotropic medication regimen at this time:  Increase Clozaril from 50 mg to 75 mg HS to address psychosis.  Continue medical management per primary team.  Observation level: In-person 1:1 continual observation  Psychiatry will continue to follow as needed. Please contact our service via [employer] with any additional questions or concerns. If contacting after hours, please call or [employer] the on-call team (MISAEL: [phone]) with any questions or concerns.           SUBJECTIVE     Patient was seen and evaluated for continuity of care. Meli appears to be  slightly improved from day prior. She continues to appear more awake and alert. She appears uncomfortable in bed, constantly attempting to reposition herself, shaking her arms. She is attempting to speak, though does not appear to be making any meaningful statements. Her oral intake is improving. She is eating, drinking, taking her medications. Otherwise, patient remains mostly nonverbal and unable or willing to participate in further interview.      Psychiatric Review of Systems:  Behavior over the last 24 hours: improved  Appetite: improving  Medication side effects: none verbalized  ROS: Review of systems could not be obtained due to patient factors    OBJECTIVE     Vital signs in last 24 hours:  Temp:  [97.8 °F (36.6 °C)-98.8 °F (37.1 °C)] 98.3 °F (36.8 °C)  HR:  [] 104  Resp:  [19-20] 20  BP: (144-160)/() 151/118    Mental Status Evaluation:  Appearance:  disheveled, dressed in hospital attire, overweight, diaphoretic, appears uncomfortable in bed   Behavior:  no eye contact, mostly nonverbal, not making any meaningful statements   Speech:  Mostly nonverbal/incoherent   Mood:  Unable to assess   Affect:  Unable to assess   Language: unable to assess   Thought Process:  Unable to assess   Associations: Unable to assess   Thought Content:  Unable to assess   Perceptual Disturbances: Unable to assess, appears preoccupied/paranoid   Risk Potential: Suicidal ideation - unable to assess, does not answer  Homicidal ideation -  unable to assess, does not answer  Potential for aggression - Not at present   Sensorium:  disoriented to place   Memory:  Unable to assess   Consciousness:  alert and awake   Attention/Concentration: Unable to assess   Intellect: unable to assess   Fund of Knowledge: Unable to assess   Insight:  Unable to assess   Judgment: Unable to assess   Muscle Strength Muscle /Tone: Unable to assess fully, patient resists on attempt to move arms, but unable to follow commands     Gait/Station:  Moving all extremities, gait not assessed   Motor Activity: no abnormal movements           ACTIVE MEDICATIONS     Current Medications:  Current Facility-Administered Medications   Medication Dose Route Frequency Provider Last Rate    acetaminophen  650 mg Per NG Tube Q4H PRN Travon Flores, DO      atorvastatin  10 mg Oral Daily Cristiano Cruz, DO      bisacodyl  10 mg Rectal Daily PRN Cristiano Cruz, DO      carvedilol  12.5 mg Oral BID With Meals Cristiano Cruz, DO      cloNIDine  0.1 mg Transdermal Weekly Travon Flores, DO      cloZAPine  75 mg Oral HS Pushpa Harhira, DO      cyanocobalamin  1,000 mcg Oral Daily Travon Flores, DO      dextrose  75 mL/hr Intravenous Continuous Cristiano Cruz DO 75 mL/hr (07/18/24 0855)    docusate sodium  100 mg Oral BID Jordan C Holter, DO      enoxaparin  40 mg Subcutaneous Daily Travon Mark, DO      famotidine  20 mg Oral BID Travon Haywoodng, DO      FLUoxetine  20 mg Oral Daily Travon Haywoodng, DO      fluticasone  1 puff Inhalation Daily Travon Flores, DO      hydrALAZINE  10 mg Intravenous Q4H PRN Travon Mark, DO      insulin lispro  1-5 Units Subcutaneous 4x Daily (AC & HS) Travon Flores, DO      LORazepam  1 mg Oral 4x Daily Travon Flores, DO      Or    LORazepam  1 mg Intravenous 4x Daily Travon Mark, DO      LORazepam  1 mg Intravenous Q4H PRN Pushpa Harach, DO      Multivitamin  15 mL Oral Daily Travon Mark, DO      nicotine  1 patch Transdermal Daily Travon Flores, DO      OLANZapine  5 mg Intramuscular Q8H PRN Pushpa Harach, DO      ondansetron  4 mg Intravenous Q4H PRN Travon Mark, DO         Behavioral Health Medications: I have personally reviewed all current active medications. Changes as in plan section above.      ADDITIONAL DATA     EKG Results: I have personally reviewed pertinent reports.  No results found for this visit on 07/13/24 (from the past 1000 hour(s)).    Radiology Results: I have personally reviewed pertinent reports. I have  personally reviewed pertinent films in PACS.  No results found.  No Chest XR results available for this patient.     Laboratory Results: I have personally reviewed all pertinent laboratory/tests results.  Recent Results (from the past 48 hour(s))   Fingerstick Glucose (POCT)    Collection Time: 07/16/24  3:49 PM   Result Value Ref Range    POC Glucose 108 65 - 140 mg/dl   Fingerstick Glucose (POCT)    Collection Time: 07/16/24  8:25 PM   Result Value Ref Range    POC Glucose 96 65 - 140 mg/dl   Basic metabolic panel    Collection Time: 07/17/24  4:37 AM   Result Value Ref Range    Sodium 144 135 - 147 mmol/L    Potassium 4.3 3.5 - 5.3 mmol/L    Chloride 109 (H) 96 - 108 mmol/L    CO2 25 21 - 32 mmol/L    ANION GAP 10 4 - 13 mmol/L    BUN 18 5 - 25 mg/dL    Creatinine 0.72 0.60 - 1.30 mg/dL    Glucose 76 65 - 140 mg/dL    Calcium 8.5 8.4 - 10.2 mg/dL    eGFR 93 ml/min/1.73sq m   Magnesium    Collection Time: 07/17/24  4:37 AM   Result Value Ref Range    Magnesium 2.2 1.9 - 2.7 mg/dL   Fingerstick Glucose (POCT)    Collection Time: 07/17/24  5:45 AM   Result Value Ref Range    POC Glucose 132 65 - 140 mg/dl   Fingerstick Glucose (POCT)    Collection Time: 07/17/24  9:40 AM   Result Value Ref Range    POC Glucose 183 (H) 65 - 140 mg/dl   Fingerstick Glucose (POCT)    Collection Time: 07/17/24  3:24 PM   Result Value Ref Range    POC Glucose 112 65 - 140 mg/dl   Fingerstick Glucose (POCT)    Collection Time: 07/17/24  8:46 PM   Result Value Ref Range    POC Glucose 104 65 - 140 mg/dl   Fingerstick Glucose (POCT)    Collection Time: 07/18/24  6:06 AM   Result Value Ref Range    POC Glucose 94 65 - 140 mg/dl   CBC    Collection Time: 07/18/24  6:15 AM   Result Value Ref Range    WBC 9.45 4.31 - 10.16 Thousand/uL    RBC 4.89 3.81 - 5.12 Million/uL    Hemoglobin 14.5 11.5 - 15.4 g/dL    Hematocrit 46.8 (H) 34.8 - 46.1 %    MCV 96 82 - 98 fL    MCH 29.7 26.8 - 34.3 pg    MCHC 31.0 (L) 31.4 - 37.4 g/dL    RDW 17.1 (H) 11.6 -  15.1 %    Platelets 255 149 - 390 Thousands/uL    MPV 10.2 8.9 - 12.7 fL   Basic metabolic panel    Collection Time: 07/18/24  6:15 AM   Result Value Ref Range    Sodium 152 (H) 135 - 147 mmol/L    Potassium 4.0 3.5 - 5.3 mmol/L    Chloride 112 (H) 96 - 108 mmol/L    CO2 29 21 - 32 mmol/L    ANION GAP 11 4 - 13 mmol/L    BUN 24 5 - 25 mg/dL    Creatinine 1.09 0.60 - 1.30 mg/dL    Glucose 82 65 - 140 mg/dL    Calcium 9.7 8.4 - 10.2 mg/dL    eGFR 56 ml/min/1.73sq m   Fingerstick Glucose (POCT)    Collection Time: 07/18/24 10:51 AM   Result Value Ref Range    POC Glucose 125 65 - 140 mg/dl        This note was not shared with the patient due to reasonable likelihood of causing patient harm        Diandra Latham DO Psychiatry Resident, PGY-1  Department of Psychiatry and Behavioral Health  [facility]

## 2024-07-18 NOTE — ASSESSMENT & PLAN NOTE
Hypernatremia secondary to psychiatric issues and free water deficit   Initially plan was to transfer to medical service for placement of NG tube and start tube feeds/free water.  She has been having improving oral intake. She was off IV dextrose for about 48 hours with recurrence of hypernatremia   Will restart d5 today 75 cc/hour. DC when oral intake improves

## 2024-07-18 NOTE — TREATMENT PLAN
Patient was being fed lunch and reported that she was choking.  On exam she is speaking in full sentences.  She has breath sounds bilaterally.  May have just been a coughing event with known dysphagia.  Will check vitals and chest x-ray.    Vitals stable. CXR independently reviewed by myself without significant changes from prior, no obvious consolidations, may be some mild vascular congestion    Patient no longer reporting choking.  No further workup at this time.

## 2024-07-18 NOTE — SPEECH THERAPY NOTE
"    5/10/2023         RE: Divina Delgadillo  [address] [address] [facility] [address]        Dear Colleague,    Thank you for referring your patient, Divina Delgadillo, to the [facility] [place]. Please see a copy of my visit note below.    Divina Delgadillo is a 39 year old female who presents with a chief complaint of a painful ingrown toenail to the right great toe.  The patient relates pain when wearing shoes.  The patient denies any redness extending up the big toe into the foot.  The patient relates the condition has been getting worse over the past several weeks.         Pertinent medical, surgical and family history was reviewed in the chart.    Vitals: BP (!) 141/71   Pulse 80   Ht 1.664 m (5' 5.5\")   Wt 106.1 kg (234 lb)   BMI 38.35 kg/m    BMI= Body mass index is 38.35 kg/m .    LOWER EXTREMITY PHYSICAL EXAM    Dermatologic: One notes an inflamed nail border of the right great toe.  There is noted erythema and edema located around the labial fold and does not extend past the interphalangeal joint.  There is no apparent purulent drainage noted.  There is pain on palpation to the proximal aspect of the nail border.  Otherwise, the skin is intact to both lower extremities without significant lesions, rash or abrasion.           Vascular: DP & PT pulses are intact & regular on the right.   CFT and skin temperature is normal to the right lower extremities.     Neurologic: Lower extremity sensation is intact to light touch.  No evidence of weakness in the right lower extremities.        Musculoskeletal: Patient is ambulatory without assistive device or brace.  No gross ankle deformity noted.  No foot or ankle joint effusion is noted.         ASSESSMENT / PLAN:     ICD-10-CM    1. Ingrown nail of great toe of right foot  L60.0 REMOVAL NAIL/NAIL BED, PARTIAL OR COMPLETE          Plan:  I have explained to Divina about the condition.  The potential causes and nature of an ingrown " Speech Language/Pathology    Speech/Language Pathology Progress Note    Patient Name: Meli Nowak  Today's Date: 7/18/2024                     SLP RECOMMENDATIONS:         Diet: Level 3 Dental soft        Liquids: Thin liquids         Medications: as best tolerated         Aspiration Precautions: upright, slow rate, small bites/sips, feeding assist        Summary:  Pt with slightly improved participation in meal this session, although verbal output remains limited. Pt observed with puree with slow, but functional manipulation of bolus and no overt s/s aspiration. Pt trialed with soft solids, banana and cereal, with prolonged, but ultimately functional mastication and mild anterior loss. Pt required feeding assist and frequent cueing for sustained attention during PO trials. Pt observed with thin liquids via small straw sips with no immediate overt s/s aspiration. Pt appeared to require extra time to coordinate using straw.   Recommend continuing Level 3 dental soft/thin liquids diet with feeding assist and aspiration precautions. Assessment somewhat limited by participation, but is improved compared to previous sessions.     Assessment:  Mild oral phase dysphagia characterized by prolonged mastication and mild anterior loss. No overt s/s aspiration observed.     Plan/Recommendations:  Level 3 Dental soft/thin liquids   Feeding assist  Aspiration precautions  SLP to follow         Lab Results   Component Value Date    WBC 9.45 07/18/2024    HGB 14.5 07/18/2024    HCT 46.8 (H) 07/18/2024    MCV 96 07/18/2024     07/18/2024           Problem List  Principal Problem:    Schizoaffective disorder, bipolar type (HCC)  Active Problems:    Type 2 diabetes mellitus (HCC)    Hyperlipidemia    Reactive airway disease    Hypernatremia    Esophageal reflux    Hypokalemia    Essential (primary) hypertension       Past Medical History  Past Medical History:   Diagnosis Date    Cognitive impairment     Diabetes mellitus  (HCC)     Essential (primary) hypertension     Psychosis (HCC)         Past Surgical History  Past Surgical History:   Procedure Laterality Date     SECTION      CHOLECYSTECTOMY                 toenail were discussed with the patient.  We reviewed the natural history and prognosis of the condition and potential risks if no treatment is provided.  Treatment options discussed included conservative management (oral antibiotics, soaking of foot, adequate width shoes)  as well as surgical management (partial or total nail removal).  The pros and cons of both forms as well as risks and benefits of treatment were reviewed.       At this point, I recommended having the offending nail border permanently removed from the right great toe.  I have explained to the patient all of the possible risks, benefits, alternatives to the procedure.  The patient consented to the proposed procedure.  The right hallux was swabbed with alcohol.  Next, approximately 3 cc of 1% lidocaine plain was injected around the right hallux.  The right hallux was then prepped with Betadine ointment.  A tourniquet was applied to the right hallux.  A [place] elevator was utilized to free up the eponychium of the offending nail border.  Next, the offending nail border was split using an English anvil back to the matrix.  Next, utilizing a straight hemostat, the offending nail border was avulsed in toto.  The wound bed was debrided on any remaining nail and hyperkeratotic skin.  Next, the offending nail matrix was treated with 89% phenol using microtip cotton applicators for 30 seconds.  The wound was then irrigated and dressed with bacitracin antibiotic ointment and a compressive  sterile dressing.  The tourniquet was removed and a prompt hyperemic response was noted.  The patient tolerated the procedure well with no complications.  The patient was given post procedure instructions for the care of the wound.  The patient was informed that it is common to experience redness with watery drainage coming from the treated areas of the toe related to the phenol application.  The patient will return in two weeks for reevaluation and was instructed to  notify the office if any redness extending past the big toe joint or fever with chills are experienced before then.      There is low risk of morbidity with the procedure.  There was no overlap in work associated with the evaluation/management and the work associated with the procedure.    Divina verbalized agreement with and understanding of the rational for the diagnosis and treatment plan.  All questions were answered to best of my ability and the patient's satisfaction. The patient was advised to contact the clinic with any questions that may arise after the clinic visit.      Disclaimer: This note consists of symbols derived from keyboarding, dictation and/or voice recognition software. As a result, there may be errors in the script that have gone undetected. Please consider this when interpreting information found in this chart.      JOYCE Hinson.P.MACI., F.A.C.F.A.S.        Again, thank you for allowing me to participate in the care of your patient.        Sincerely,        Raul Douglas DPM

## 2024-07-18 NOTE — PLAN OF CARE
Problem: METABOLIC, FLUID AND ELECTROLYTES - ADULT  Goal: Electrolytes maintained within normal limits  Description: INTERVENTIONS:  - Monitor labs and assess patient for signs and symptoms of electrolyte imbalances  - Administer electrolyte replacement as ordered  - Monitor response to electrolyte replacements, including repeat lab results as appropriate  - Instruct patient on fluid and nutrition as appropriate  Outcome: Progressing  Goal: Glucose maintained within target range  Description: INTERVENTIONS:  - Monitor Blood Glucose as ordered  - Assess for signs and symptoms of hyperglycemia and hypoglycemia  - Administer ordered medications to maintain glucose within target range  - Assess nutritional intake and initiate nutrition service referral as needed  Outcome: Progressing     Problem: Nutrition/Hydration-ADULT  Goal: Nutrient/Hydration intake appropriate for improving, restoring or maintaining nutritional needs  Description: Monitor and assess patient's nutrition/hydration status for malnutrition. Collaborate with interdisciplinary team and initiate plan and interventions as ordered.  Monitor patient's weight and dietary intake as ordered or per policy. Utilize nutrition screening tool and intervene as necessary. Determine patient's food preferences and provide high-protein, high-caloric foods as appropriate.     INTERVENTIONS:  - Monitor oral intake, urinary output, labs, and treatment plans  - Assess nutrition and hydration status and recommend course of action  - Evaluate amount of meals eaten  - Assist patient with eating if necessary   - Allow adequate time for meals  - Recommend/ encourage appropriate diets, oral nutritional supplements, and vitamin/mineral supplements  - Order, calculate, and assess calorie counts as needed  - Recommend, monitor, and adjust tube feedings and TPN/PPN based on assessed needs  - Assess need for intravenous fluids  - Provide specific nutrition/hydration education as  appropriate  - Include patient/family/caregiver in decisions related to nutrition  Outcome: Progressing     Problem: Knowledge Deficit  Goal: Patient/family/caregiver demonstrates understanding of disease process, treatment plan, medications, and discharge instructions  Description: Complete learning assessment and assess knowledge base.  Interventions:  - Provide teaching at level of understanding  - Provide teaching via preferred learning methods  Outcome: Progressing     Problem: DISCHARGE PLANNING  Goal: Discharge to home or other facility with appropriate resources  Description: INTERVENTIONS:  - Identify barriers to discharge w/patient and caregiver  - Arrange for needed discharge resources and transportation as appropriate  - Identify discharge learning needs (meds, wound care, etc.)  - Arrange for interpretive services to assist at discharge as needed  - Refer to Case Management Department for coordinating discharge planning if the patient needs post-hospital services based on physician/advanced practitioner order or complex needs related to functional status, cognitive ability, or social support system  Outcome: Progressing

## 2024-07-18 NOTE — CASE MANAGEMENT
Case Management Progress Note    Patient name Meli Nowak  Location 7T U 702/7T U 702-01 MRN [id number]  : 1965 Date 2024       LOS (days): 5  Geometric Mean LOS (GMLOS) (days): 2.6  Days to GMLOS:-2.4        OBJECTIVE:        Current admission status: Inpatient  Preferred Pharmacy:   UNKNOWN - FOLLOW UP PRIOR TO DISCHARGE TO E-PRESCRIBE  No address on file      Primary Care Provider: Adriana Bradford MD    Primary Insurance: MEDICARE  Secondary Insurance:     PROGRESS NOTE:    304 completed by MD Dr Fonseca.    Met with patient at bedside to give rights.  Patient not understanding rights due to current psych condition.    304 emailed to [email or web address].    Hearing scheduled  at 8:00 am. Dr Fonseca updated same.

## 2024-07-18 NOTE — ASSESSMENT & PLAN NOTE
History of reactive airway disease diabetes hypertension and mood disorder transferred from [facility] for medical optimization.  7/2/2024: Admitted to [facility].  7/3/2024: Transferred to medical service for concerns of NMS.  Transferred to [facility] for formal neurology evaluation.  MRI negative.  LP without evidence of infection.  Paraneoplastic panel negative.  7/11/2024: Transferred back to [facility] but has had no oral intake and refusing to take medications.  Reviewed outpatient records.  Previously on buspirone 10 mg twice daily, alprazolam XR 2 mg in the morning and 1 mg at bedtime, clozapine 200 mg in the morning and 300 mg at bedtime, paroxetine 60 mg daily  Currently on scheduled ativan with slow titration/resumption of Clozaril which patient appears to be tolerating. Being managed by inpatient psychiatry    Still with inconsistent oral intake and labile BP  Once she has consistent oral intake - okay for transfer back to psychiatry service

## 2024-07-18 NOTE — PROGRESS NOTES
[facility]  Progress Note  Name: Meli Nowak I  MRN: [id number]  Unit/Bed#: 7T [facility] 702-01 I Date of Admission: 7/13/2024   Date of Service: 7/18/2024 I Hospital Day: 5    Assessment & Plan   * Schizoaffective disorder, bipolar type (HCC)  Assessment & Plan  History of reactive airway disease diabetes hypertension and mood disorder transferred from [facility] for medical optimization.  7/2/2024: Admitted to [facility].  7/3/2024: Transferred to medical service for concerns of NMS.  Transferred to [facility] for formal neurology evaluation.  MRI negative.  LP without evidence of infection.  Paraneoplastic panel negative.  7/11/2024: Transferred back to [facility] but has had no oral intake and refusing to take medications.  Reviewed outpatient records.  Previously on buspirone 10 mg twice daily, alprazolam XR 2 mg in the morning and 1 mg at bedtime, clozapine 200 mg in the morning and 300 mg at bedtime, paroxetine 60 mg daily  Currently on scheduled ativan with slow titration/resumption of Clozaril which patient appears to be tolerating. Being managed by inpatient psychiatry    Still with inconsistent oral intake and labile BP  Once she has consistent oral intake - okay for transfer back to psychiatry service    Essential (primary) hypertension  Assessment & Plan  Patient's blood pressure has been elevated due to inconsistent oral intake of pills  Started on clonidine patch over the weekend  Continue carvedilol to 12.5 twice daily  Improved now with more consistent oral intake of pills   Continue hydralazine as needed    Esophageal reflux  Assessment & Plan  Continue Pepcid    Hypernatremia  Assessment & Plan  Hypernatremia secondary to psychiatric issues and free water deficit   Initially plan was to transfer to medical service for placement of NG tube and start tube feeds/free water.  She has been having improving oral intake. She was off IV dextrose for about 48 hours with recurrence of  hypernatremia   Will restart d5 today 75 cc/hour. DC when oral intake improves       Reactive airway disease  Assessment & Plan  Continue with prior to admission on Arnuity Ellipta.  No signs of acute exacerbation    Hyperlipidemia  Assessment & Plan  Continue prior to admission atorvastatin    Type 2 diabetes mellitus (HCC)  Assessment & Plan  Lab Results   Component Value Date    HGBA1C 6.4 (H) 05/03/2024     Recent Labs     07/17/24  0940 07/17/24  1524 07/17/24  2046 07/18/24  0606   POCGLU 183* 112 104 94       Prior to admission on metformin.  Placed on sliding scale insulin during medical hospitalization  Blood sugars adequately controlled. Monitor while on D5 infusion     Hypokalemia-resolved as of 7/18/2024  Assessment & Plan  Resolved, replace with IV fluids           VTE Pharmacologic Prophylaxis: lovenox     Patient Centered Rounds:  Patient care rounds were performed with nursing    Discussions with Specialists or Other Care Team Provider: Psychiatry     Education and Discussions with Family / Patient: Called daughter no answer, Updated outpatient cm Conchita burton via telephone approx [phone]    Time Spent for Care: I have spent a total time of 45 minutes on 07/18/24 in caring for this patient including Diagnostic results, Risks and benefits of tx options, Instructions for management, Patient and family education, Importance of tx compliance, Risk factor reductions, Impressions, Documenting in the medical record, Reviewing / ordering tests, medicine, procedures  , Obtaining or reviewing history  , and Communicating with other healthcare professionals .      Current Length of Stay: 5 day(s)    Current Patient Status: Inpatient   Certification Statement: The patient will continue to require additional inpatient hospital stay due to need for IVF, management of catatonia     Discharge Plan: Pending stability of electrolytes/bp and adequate oral intake     Code Status: Level 1 - Full Code      Subjective:    Patient seen and evaluated at bedside. Lying in bed spontaneously moving extremities. Appears preoccupied. Inconsistent, Incoherent responses to questions.     Objective:     Vitals:   Temp (24hrs), Av.5 °F (36.9 °C), Min:97.8 °F (36.6 °C), Max:98.8 °F (37.1 °C)    Temp:  [97.8 °F (36.6 °C)-98.8 °F (37.1 °C)] 98.8 °F (37.1 °C)  HR:  [] 100  Resp:  [19-20] 20  BP: (144-160)/(87-99) 160/99  SpO2:  [90 %-91 %] 91 %  Body mass index is 37.67 kg/m².     Input and Output Summary (last 24 hours):       Intake/Output Summary (Last 24 hours) at 2024 1032  Last data filed at 2024 0200  Gross per 24 hour   Intake 460 ml   Output --   Net 460 ml       Physical Exam:     Physical Exam  Vitals reviewed.   Constitutional:       General: She is not in acute distress.     Appearance: She is well-developed. She is not ill-appearing, toxic-appearing or diaphoretic.   HENT:      Head: Normocephalic and atraumatic.      Mouth/Throat:      Mouth: Mucous membranes are moist.   Eyes:      General: No scleral icterus.     Extraocular Movements: Extraocular movements intact.   Cardiovascular:      Rate and Rhythm: Normal rate and regular rhythm.      Heart sounds: Normal heart sounds.   Pulmonary:      Effort: Pulmonary effort is normal. No respiratory distress.      Breath sounds: Normal breath sounds. No wheezing or rales.   Abdominal:      General: There is no distension.      Palpations: Abdomen is soft.      Tenderness: There is no abdominal tenderness. There is no guarding or rebound.   Musculoskeletal:         General: No swelling, tenderness or deformity.   Skin:     General: Skin is warm and dry.   Neurological:      General: No focal deficit present.      Mental Status: She is alert. She is disoriented.   Psychiatric:      Comments: Incoherent responses to questions, appears preoccupied          Additional Data:     Labs: I have reviewed pertinent results     Results from last 7 days   Lab Units 24  0604  07/15/24  0622   WBC Thousand/uL 9.45 7.74   HEMOGLOBIN g/dL 14.5 13.0   HEMATOCRIT % 46.8* 43.0   PLATELETS Thousands/uL 255 274   SEGS PCT %  --  56   LYMPHO PCT %  --  30   MONO PCT %  --  13*   EOS PCT %  --  0     Results from last 7 days   Lab Units 07/18/24  0615 07/16/24  0527 07/15/24  0622   SODIUM mmol/L 152*   < > 147   POTASSIUM mmol/L 4.0   < > 3.5   CHLORIDE mmol/L 112*   < > 110*   CO2 mmol/L 29   < > 28   BUN mg/dL 24   < > 18   CREATININE mg/dL 1.09   < > 0.84   ANION GAP mmol/L 11   < > 9   CALCIUM mg/dL 9.7   < > 9.1   ALBUMIN g/dL  --   --  3.5   TOTAL BILIRUBIN mg/dL  --   --  0.80   ALK PHOS U/L  --   --  69   ALT U/L  --   --  19   AST U/L  --   --  22   GLUCOSE RANDOM mg/dL 82   < > 107    < > = values in this interval not displayed.         Results from last 7 days   Lab Units 07/18/24  0606 07/17/24  2046 07/17/24  1524 07/17/24  0940 07/17/24  0545 07/16/24  2025 07/16/24  1549 07/16/24  1106 07/16/24  0600 07/15/24  2018 07/15/24  1608 07/15/24  1049   POC GLUCOSE mg/dl 94 104 112 183* 132 96 108 108 104 107 118 156*                 Imaging: I have reviewed pertinent imaging       Recent Cultures (last 7 days):           Last 24 Hours Medication List:   Current Facility-Administered Medications   Medication Dose Route Frequency Provider Last Rate    acetaminophen  650 mg Per NG Tube Q4H PRN Travon Flores DO      atorvastatin  10 mg Oral Daily Cristiano Cruz DO      bisacodyl  10 mg Rectal Daily PRN Cristiano Cruz DO      carvedilol  12.5 mg Oral BID With Meals Cristiano Cruz DO      cloNIDine  0.1 mg Transdermal Weekly Tarvon Flores DO      cloZAPine  50 mg Oral HS Cristiano Cruz DO      cyanocobalamin  1,000 mcg Oral Daily Travon Flores DO      dextrose  75 mL/hr Intravenous Continuous Cristiano Cruz DO 75 mL/hr (07/18/24 0855)    docusate sodium  100 mg Oral BID Jordan C Holter, DO      enoxaparin  40 mg Subcutaneous Daily Travon Flores DO      famotidine  20 mg  Oral BID Travon Mark, DO      FLUoxetine  20 mg Oral Daily Travon Flores, DO      fluticasone  1 puff Inhalation Daily Travon Flores, DO      hydrALAZINE  10 mg Intravenous Q4H PRN Travon Flores, DO      insulin lispro  1-5 Units Subcutaneous 4x Daily (AC & HS) Travon Mark, DO      LORazepam  1 mg Oral 4x Daily Travon Flores, DO      Or    LORazepam  1 mg Intravenous 4x Daily Tarvon Flores, DO      LORazepam  1 mg Intravenous Q4H PRN Pushpa Fonseca DO      Multivitamin  15 mL Oral Daily Travon Flores, DO      nicotine  1 patch Transdermal Daily Travon Flores, DO      OLANZapine  5 mg Intramuscular Q8H PRN Pushpa Fonseca, DO      ondansetron  4 mg Intravenous Q4H PRN Travon Flores, DO          Today, Patient Was Seen By: Cristiano Cruz DO    ** Please Note: Dictation voice to text software may have been used in the creation of this document. **

## 2024-07-18 NOTE — PLAN OF CARE
Problem: Nutrition/Hydration-ADULT  Goal: Nutrient/Hydration intake appropriate for improving, restoring or maintaining nutritional needs  Description: Monitor and assess patient's nutrition/hydration status for malnutrition. Collaborate with interdisciplinary team and initiate plan and interventions as ordered.  Monitor patient's weight and dietary intake as ordered or per policy. Utilize nutrition screening tool and intervene as necessary. Determine patient's food preferences and provide high-protein, high-caloric foods as appropriate.     INTERVENTIONS:  - Monitor oral intake, urinary output, labs, and treatment plans  - Assess nutrition and hydration status and recommend course of action  - Evaluate amount of meals eaten  - Assist patient with eating if necessary   - Allow adequate time for meals  - Recommend/ encourage appropriate diets, oral nutritional supplements, and vitamin/mineral supplements  - Order, calculate, and assess calorie counts as needed  - Recommend, monitor, and adjust tube feedings and TPN/PPN based on assessed needs  - Assess need for intravenous fluids  - Provide specific nutrition/hydration education as appropriate  - Include patient/family/caregiver in decisions related to nutrition  Outcome: Progressing     Problem: Prexisting or High Potential for Compromised Skin Integrity  Goal: Skin integrity is maintained or improved  Description: INTERVENTIONS:  - Identify patients at risk for skin breakdown  - Assess and monitor skin integrity  - Assess and monitor nutrition and hydration status  - Monitor labs   - Assess for incontinence   - Turn and reposition patient  - Assist with mobility/ambulation  - Relieve pressure over bony prominences  - Avoid friction and shearing  - Provide appropriate hygiene as needed including keeping skin clean and dry  - Evaluate need for skin moisturizer/barrier cream  - Collaborate with interdisciplinary team   - Patient/family teaching  - Consider wound  care consult   Outcome: Progressing     Problem: METABOLIC, FLUID AND ELECTROLYTES - ADULT  Goal: Electrolytes maintained within normal limits  Description: INTERVENTIONS:  - Monitor labs and assess patient for signs and symptoms of electrolyte imbalances  - Administer electrolyte replacement as ordered  - Monitor response to electrolyte replacements, including repeat lab results as appropriate  - Instruct patient on fluid and nutrition as appropriate  Outcome: Progressing     Problem: METABOLIC, FLUID AND ELECTROLYTES - ADULT  Goal: Glucose maintained within target range  Description: INTERVENTIONS:  - Monitor Blood Glucose as ordered  - Assess for signs and symptoms of hyperglycemia and hypoglycemia  - Administer ordered medications to maintain glucose within target range  - Assess nutritional intake and initiate nutrition service referral as needed  Outcome: Progressing

## 2024-07-18 NOTE — ASSESSMENT & PLAN NOTE
Patient's blood pressure has been elevated due to inconsistent oral intake of pills  Started on clonidine patch over the weekend  Continue carvedilol to 12.5 twice daily  Improved now with more consistent oral intake of pills   Continue hydralazine as needed

## 2024-07-18 NOTE — ASSESSMENT & PLAN NOTE
Lab Results   Component Value Date    HGBA1C 6.4 (H) 05/03/2024     Recent Labs     07/17/24  0940 07/17/24  1524 07/17/24  2046 07/18/24  0606   POCGLU 183* 112 104 94       Prior to admission on metformin.  Placed on sliding scale insulin during medical hospitalization  Blood sugars adequately controlled. Monitor while on D5 infusion

## 2024-07-19 LAB
ANION GAP SERPL CALCULATED.3IONS-SCNC: 8 MMOL/L (ref 4–13)
BUN SERPL-MCNC: 22 MG/DL (ref 5–25)
CALCIUM SERPL-MCNC: 8.9 MG/DL (ref 8.4–10.2)
CHLORIDE SERPL-SCNC: 108 MMOL/L (ref 96–108)
CO2 SERPL-SCNC: 32 MMOL/L (ref 21–32)
CREAT SERPL-MCNC: 0.99 MG/DL (ref 0.6–1.3)
GFR SERPL CREATININE-BSD FRML MDRD: 63 ML/MIN/1.73SQ M
GLUCOSE SERPL-MCNC: 116 MG/DL (ref 65–140)
GLUCOSE SERPL-MCNC: 124 MG/DL (ref 65–140)
GLUCOSE SERPL-MCNC: 125 MG/DL (ref 65–140)
GLUCOSE SERPL-MCNC: 139 MG/DL (ref 65–140)
GLUCOSE SERPL-MCNC: 221 MG/DL (ref 65–140)
POTASSIUM SERPL-SCNC: 3.1 MMOL/L (ref 3.5–5.3)
SODIUM SERPL-SCNC: 148 MMOL/L (ref 135–147)

## 2024-07-19 PROCEDURE — 80048 BASIC METABOLIC PNL TOTAL CA: CPT | Performed by: INTERNAL MEDICINE

## 2024-07-19 PROCEDURE — 99232 SBSQ HOSP IP/OBS MODERATE 35: CPT | Performed by: PSYCHIATRY & NEUROLOGY

## 2024-07-19 PROCEDURE — 82948 REAGENT STRIP/BLOOD GLUCOSE: CPT

## 2024-07-19 PROCEDURE — 99232 SBSQ HOSP IP/OBS MODERATE 35: CPT | Performed by: INTERNAL MEDICINE

## 2024-07-19 RX ORDER — CARVEDILOL 6.25 MG/1
6.25 TABLET ORAL 2 TIMES DAILY WITH MEALS
Status: DISCONTINUED | OUTPATIENT
Start: 2024-07-19 | End: 2024-07-23 | Stop reason: HOSPADM

## 2024-07-19 RX ORDER — WATER 10 ML/10ML
INJECTION INTRAMUSCULAR; INTRAVENOUS; SUBCUTANEOUS
Status: COMPLETED
Start: 2024-07-19 | End: 2024-07-19

## 2024-07-19 RX ADMIN — CLOZAPINE 75 MG: 25 TABLET ORAL at 21:40

## 2024-07-19 RX ADMIN — POTASSIUM CHLORIDE 20 MEQ: 2 INJECTION, SOLUTION, CONCENTRATE INTRAVENOUS at 17:50

## 2024-07-19 RX ADMIN — LORAZEPAM 1 MG: 1 TABLET ORAL at 08:30

## 2024-07-19 RX ADMIN — OLANZAPINE 5 MG: 10 INJECTION, POWDER, FOR SOLUTION INTRAMUSCULAR at 13:06

## 2024-07-19 RX ADMIN — LORAZEPAM 1 MG: 1 TABLET ORAL at 17:49

## 2024-07-19 RX ADMIN — NICOTINE 1 PATCH: 7 PATCH, EXTENDED RELEASE TRANSDERMAL at 08:45

## 2024-07-19 RX ADMIN — DEXTROSE 75 ML/HR: 5 SOLUTION INTRAVENOUS at 05:39

## 2024-07-19 RX ADMIN — LORAZEPAM 1 MG: 2 INJECTION INTRAMUSCULAR; INTRAVENOUS at 11:17

## 2024-07-19 RX ADMIN — ENOXAPARIN SODIUM 40 MG: 40 INJECTION SUBCUTANEOUS at 08:30

## 2024-07-19 RX ADMIN — FLUOXETINE 20 MG: 20 SOLUTION ORAL at 08:30

## 2024-07-19 RX ADMIN — FAMOTIDINE 20 MG: 40 POWDER, FOR SUSPENSION ORAL at 17:50

## 2024-07-19 RX ADMIN — CYANOCOBALAMIN TAB 1000 MCG 1000 MCG: 1000 TAB at 08:30

## 2024-07-19 RX ADMIN — INSULIN LISPRO 1 UNITS: 100 INJECTION, SOLUTION INTRAVENOUS; SUBCUTANEOUS at 11:17

## 2024-07-19 RX ADMIN — POTASSIUM CHLORIDE 20 MEQ: 2 INJECTION, SOLUTION, CONCENTRATE INTRAVENOUS at 09:28

## 2024-07-19 RX ADMIN — CARVEDILOL 12.5 MG: 12.5 TABLET, FILM COATED ORAL at 08:30

## 2024-07-19 RX ADMIN — FAMOTIDINE 20 MG: 40 POWDER, FOR SUSPENSION ORAL at 08:30

## 2024-07-19 RX ADMIN — CARVEDILOL 6.25 MG: 6.25 TABLET, FILM COATED ORAL at 17:50

## 2024-07-19 RX ADMIN — LORAZEPAM 1 MG: 2 INJECTION INTRAMUSCULAR; INTRAVENOUS at 15:24

## 2024-07-19 RX ADMIN — Medication 15 ML: at 08:30

## 2024-07-19 RX ADMIN — LORAZEPAM 1 MG: 1 TABLET ORAL at 21:40

## 2024-07-19 RX ADMIN — ATORVASTATIN CALCIUM 10 MG: 10 TABLET, FILM COATED ORAL at 08:30

## 2024-07-19 RX ADMIN — WATER 10 ML: 1 INJECTION, SOLUTION INTRAMUSCULAR; INTRAVENOUS; SUBCUTANEOUS at 13:07

## 2024-07-19 NOTE — OCCUPATIONAL THERAPY NOTE
Occupational Therapy Evaluation     Patient Name: Meli Nowak  Today's Date: 2024  Problem List  Principal Problem:    Schizoaffective disorder, bipolar type (HCC)  Active Problems:    Type 2 diabetes mellitus (HCC)    Hyperlipidemia    Reactive airway disease    Hypernatremia    Esophageal reflux    Essential (primary) hypertension    Past Medical History  Past Medical History:   Diagnosis Date    Cognitive impairment     Diabetes mellitus (HCC)     Essential (primary) hypertension     Psychosis (HCC)      Past Surgical History  Past Surgical History:   Procedure Laterality Date     SECTION      CHOLECYSTECTOMY               24 1035   OT Last Visit   OT Visit Date 24   Note Type   Note type Evaluation   Pain Assessment   Pain Assessment Tool 0-10   Pain Score No Pain   Restrictions/Precautions   Weight Bearing Precautions Per Order No   Other Precautions Impulsive;Cognitive   Home Living   Type of Home Apartment   Home Layout One level;Stairs to enter with rails  (8th floor apt)   Bathroom Shower/Tub Tub/shower unit   Bathroom Toilet Standard   Prior Function   Level of [place] Independent with ADLs;Independent with IADLS   Lives With Alone   ADL   Grooming Assistance 3  Moderate Assistance   UB Bathing Assistance 3  Moderate Assistance   LB Bathing Assistance 3  Moderate Assistance   UB Dressing Assistance 3  Moderate Assistance   LB Dressing Assistance 2  Maximal Assistance   Toileting Assistance  2  Maximal Assistance   Transfers   Sit to Stand 4  Minimal assistance   Stand to Sit 4  Minimal assistance   Balance   Static Sitting Good   Dynamic Sitting Fair +   Static Standing Fair   Dynamic Standing Fair   Ambulatory Fair   Activity Tolerance   Activity Tolerance Patient tolerated treatment well   RUE Assessment   RUE Assessment WFL   LUE Assessment   LUE Assessment WFL   Cognition   Arousal/Participation Alert   Attention Attends with cues to redirect   Memory Decreased  recall of recent events;Decreased recall of precautions   Assessment   Limitation Decreased ADL status;Decreased high-level ADLs;Decreased endurance;Decreased Safe judgement during ADL;Decreased UE strength   Assessment   Pt is a 59 y.o. female seen for OT evaluation s/p admit to \[facility]\"" on 7/13/2024 w/ Schizoaffective disorder, bipolar type (HCC).  See medical history above for extensive list of comorbidities affecting pt's functional performance at time of assessment. Personal factors affecting Pt at time of IE include:limited home support, behavioral pattern, and health management . Upon evaluation: Pt agitated, not following directions or commands.  Pt attempting to get OOB. Pt briefly came to standing position impulsively given Betina however with uncontrolled return to bed. Pt required maxA to manage toileting from supine position. Pt's primary barrier(s) at this time: decreased engagement, safety, cognition, as well as endurance and balance deficits. The following deficits impact occupational performance: weakness, decreased strength, decreased balance, decreased tolerance, impaired memory, impaired sequencing, impaired problem solving, and decreased safety awareness. Pt to benefit from continued skilled OT services while in the hospital to address deficits as defined above and maximize level of functional independence w ADL's and functional mobility. Occupational performance areas to address include: grooming, bathing/shower, toilet hygiene, dressing, health maintenance, functional mobility, and clothing management. From OT standpoint, recommendation at time of d/c would be moderate resource intensity.       Goals   STG Time Frame   (2 weeks)   ST Goals                                     1. Pt will complete ADL transfers with Betina. 2. Pt will complete functional ambulation with  Betina. 3. Pt will complete LB ADLs with Betina.    Plan   Treatment Interventions ADL retraining;Functional transfer training;UE  strengthening/ROM;Endurance training;Continued evaluation;Energy conservation;Activityengagement   Goal Expiration Date 07/31/24   OT Frequency 1-2x/wk   Discharge Recommendation   Rehab Resource Intensity Level, OT II (Moderate Resource Intensity)   AM-PAC Daily Activity Inpatient   Lower Body Dressing 1   Bathing 1   Toileting 1   Upper Body Dressing 2   Grooming 2   Eating 3   Daily Activity Raw Score 10   Turning Head Towards Sound 3   Follow Simple Instructions 3   Low Function Daily Activity Raw Score 16   Low Function Daily Activity Standardized Score  27.65

## 2024-07-19 NOTE — PLAN OF CARE
Problem: INFECTION - ADULT  Goal: Absence or prevention of progression during hospitalization  Description: INTERVENTIONS:  - Assess and monitor for signs and symptoms of infection  - Monitor lab/diagnostic results  - Monitor all insertion sites, i.e. indwelling lines, tubes, and drains  - Monitor endotracheal if appropriate and nasal secretions for changes in amount and color  - [place] appropriate cooling/warming therapies per order  - Administer medications as ordered  - Instruct and encourage patient and family to use good hand hygiene technique  - Identify and instruct in appropriate isolation precautions for identified infection/condition  Outcome: Progressing     Problem: METABOLIC, FLUID AND ELECTROLYTES - ADULT  Goal: Electrolytes maintained within normal limits  Description: INTERVENTIONS:  - Monitor labs and assess patient for signs and symptoms of electrolyte imbalances  - Administer electrolyte replacement as ordered  - Monitor response to electrolyte replacements, including repeat lab results as appropriate  - Instruct patient on fluid and nutrition as appropriate  Outcome: Progressing     Problem: METABOLIC, FLUID AND ELECTROLYTES - ADULT  Goal: Glucose maintained within target range  Description: INTERVENTIONS:  - Monitor Blood Glucose as ordered  - Assess for signs and symptoms of hyperglycemia and hypoglycemia  - Administer ordered medications to maintain glucose within target range  - Assess nutritional intake and initiate nutrition service referral as needed  Outcome: Progressing     Problem: MOBILITY - ADULT  Goal: Maintain or return to baseline ADL function  Description: INTERVENTIONS:  -  Assess patient's ability to carry out ADLs; assess patient's baseline for ADL function and identify physical deficits which impact ability to perform ADLs (bathing, care of mouth/teeth, toileting, grooming, dressing, etc.)  - Assess/evaluate cause of self-care deficits   - Assess range of motion  - Assess  patient's mobility; develop plan if impaired  - Assess patient's need for assistive devices and provide as appropriate  - Encourage maximum independence but intervene and supervise when necessary  - Involve family in performance of ADLs  - Assess for home care needs following discharge   - Consider OT consult to assist with ADL evaluation and planning for discharge  - Provide patient education as appropriate  Outcome: Progressing

## 2024-07-19 NOTE — PROGRESS NOTES
PSYCHIATRY CONSULT SERVICE  PROGRESS NOTE    Meli Nowak 59 y.o. female MRN: [id number]  Unit/Bed#: 7T [facility] 702-01 Encounter: [id number]     ASSESSMENT / PLAN     Meli Nowak is a 59 y.o. female, with history of schizoaffective disorder, who initially presented to [facility] inpatient behavioral adult unit on 7/3/2024 for psychosis. Patient has been transferred to the medical unit multiple times during her admission, previously for symptoms of atypical NMS in the setting of abrupt discontinuation of Clozaril, AMS with negative neurological workup. Currently patient is on the medical floor as of 7/13/2024 due to poor oral intake. Patient has been restarted on Clozaril and titrated to current regimen of Clozaril 75 mg HS to address psychosis. She is slightly improving, eating and drinking more each day, taking her medications, beginning to speak, though not meaningfully. Patient is committed under involuntary admission and will be transferred to the behavioral health unit once medically stable.    Principal Psychiatric Problem:  Acute on chronic schizoaffective disorder (HCC), less likely NMS vs catatonia       Principal Problem:    Schizoaffective disorder, bipolar type (HCC)  Active Problems:    Type 2 diabetes mellitus (HCC)    Hyperlipidemia    Reactive airway disease    Hypernatremia    Esophageal reflux    Essential (primary) hypertension      RECOMMENDED TREATMENT     Discussed plan with primary team as follows:  Hospital labs reviewed.  Collaborate with collaterals for further assessment and disposition as indicated.  Patient has 304 petitioned for involuntary commitment. Plan to return to adult behavioral health unit once medically stable.  Recommend no changes to psychotropic medications at this time  Continue current psychotropic medication regimen at this time. Will continue to taper Clozaril as necessary.  Continue medical management per primary team.  Observation level: In-person 1:1  continual observation  Psychiatry will continue to follow as needed. Please contact our service via Connectivity Secure Chat with any additional questions or concerns. If contacting after hours, please call or TigerText the on-call team (GOVINDJOANNA: [phone]) with any questions or concerns.         SUBJECTIVE     Patient was seen and evaluated for continuity of care. Meli presents more relaxed today, though she still appears paranoid. She is laying in bed, eyes closed, arms rested on abdomen, resistant to movement. She is unable to follow commands. Patient occasionally mumbles incoherently, but mostly remains nonverbal. She has been increasingly eating and drinking. She continues to take her medications.     Psychiatric Review of Systems:  Behavior over the last 24 hours: improved  Appetite: improving  Medication side effects: none verbalized  ROS: Review of systems could not be obtained due to patient factors    OBJECTIVE     Vital signs in last 24 hours:  Temp:  [98.3 °F (36.8 °C)-98.7 °F (37.1 °C)] 98.6 °F (37 °C)  HR:  [] 85  Resp:  [18-20] 18  BP: (108-170)/() 119/77    Mental Status Evaluation:  Appearance:  disheveled, dressed in hospital attire, overweight   Behavior:  calm, no eye contact, increased facial hair noted   Speech:  poverty of speech   Mood:  Unable to assess   Affect:  Unable to assess   Language: unable to assess   Thought Process:  unable to assess   Associations: unable to assess - does not answer   Thought Content:  Unable to assess   Perceptual Disturbances: appears preoccupied   Risk Potential: Suicidal ideation - unable to assess  Homicidal ideation - unable to assess  Potential for aggression - Not at present   Sensorium:  does not answer   Memory:  patient does not answer   Consciousness:  alert and awake   Attention/Concentration: Unable to assess   Intellect: unable to assess   Fund of Knowledge: Unable to assess   Insight:  Unable to assess   Judgment: Unable to assess   Muscle  Strength Muscle Tone: Unable to assess, patient resists movement of arms bilaterally   Gait/Station: Moving all extremities, did not assess gait   Motor Activity: no abnormal movements           ACTIVE MEDICATIONS     Current Medications:  Current Facility-Administered Medications   Medication Dose Route Frequency Provider Last Rate    acetaminophen  650 mg Per NG Tube Q4H PRN Travon Haywoodng, DO      atorvastatin  10 mg Oral Daily Cristiano Cruz DO      bisacodyl  10 mg Rectal Daily PRN Cristiano Cruz, DO      carvedilol  12.5 mg Oral BID With Meals Cristiano Cruz, DO      cloNIDine  0.1 mg Transdermal Weekly Travon Flores, DO      cloZAPine  75 mg Oral HS Pushpa Fonseca, DO      cyanocobalamin  1,000 mcg Oral Daily Travon Flores, DO      dextrose  75 mL/hr Intravenous Continuous Cristiano Cruz DO 75 mL/hr (07/19/24 0539)    docusate sodium  100 mg Oral BID Jordan C Holter, DO      enoxaparin  40 mg Subcutaneous Daily Travon Mark, DO      famotidine  20 mg Oral BID Travon Haywoodng, DO      FLUoxetine  20 mg Oral Daily Travon Mark, DO      fluticasone  1 puff Inhalation Daily Travon Mark, DO      hydrALAZINE  10 mg Intravenous Q4H PRN Travon Mark, DO      insulin lispro  1-5 Units Subcutaneous 4x Daily (AC & HS) Travon Mark, DO      LORazepam  1 mg Oral 4x Daily Travon Mark, DO      Or    LORazepam  1 mg Intravenous 4x Daily Travon Mark, DO      LORazepam  1 mg Intravenous Q4H PRN Pushpa Harach, DO      Multivitamin  15 mL Oral Daily Travon Mark, DO      nicotine  1 patch Transdermal Daily Travon Mark, DO      OLANZapine  5 mg Intramuscular Q8H PRN Pushpa Harach, DO      ondansetron  4 mg Intravenous Q4H PRN Travon Mark, DO         Behavioral Health Medications: I have personally reviewed all current active medications. Changes as in plan section above.      ADDITIONAL DATA     EKG Results: I have personally reviewed pertinent reports.  No results found for this visit on 07/13/24 (from the  past 1000 hour(s)).    Radiology Results: I have personally reviewed pertinent reports. I have personally reviewed pertinent films in PACS.  XR chest portable    Result Date: 7/18/2024  Impression: No acute cardiopulmonary disease. Nothing to indicate aspiration. Workstation performed: [id number]     XR chest portable    Result Date: 7/18/2024  Impression No acute cardiopulmonary disease. Nothing to indicate aspiration. Workstation performed: [id number]        Laboratory Results: I have personally reviewed all pertinent laboratory/tests results.  Recent Results (from the past 48 hour(s))   Fingerstick Glucose (POCT)    Collection Time: 07/17/24  9:40 AM   Result Value Ref Range    POC Glucose 183 (H) 65 - 140 mg/dl   Fingerstick Glucose (POCT)    Collection Time: 07/17/24  3:24 PM   Result Value Ref Range    POC Glucose 112 65 - 140 mg/dl   Fingerstick Glucose (POCT)    Collection Time: 07/17/24  8:46 PM   Result Value Ref Range    POC Glucose 104 65 - 140 mg/dl   Fingerstick Glucose (POCT)    Collection Time: 07/18/24  6:06 AM   Result Value Ref Range    POC Glucose 94 65 - 140 mg/dl   CBC    Collection Time: 07/18/24  6:15 AM   Result Value Ref Range    WBC 9.45 4.31 - 10.16 Thousand/uL    RBC 4.89 3.81 - 5.12 Million/uL    Hemoglobin 14.5 11.5 - 15.4 g/dL    Hematocrit 46.8 (H) 34.8 - 46.1 %    MCV 96 82 - 98 fL    MCH 29.7 26.8 - 34.3 pg    MCHC 31.0 (L) 31.4 - 37.4 g/dL    RDW 17.1 (H) 11.6 - 15.1 %    Platelets 255 149 - 390 Thousands/uL    MPV 10.2 8.9 - 12.7 fL   Basic metabolic panel    Collection Time: 07/18/24  6:15 AM   Result Value Ref Range    Sodium 152 (H) 135 - 147 mmol/L    Potassium 4.0 3.5 - 5.3 mmol/L    Chloride 112 (H) 96 - 108 mmol/L    CO2 29 21 - 32 mmol/L    ANION GAP 11 4 - 13 mmol/L    BUN 24 5 - 25 mg/dL    Creatinine 1.09 0.60 - 1.30 mg/dL    Glucose 82 65 - 140 mg/dL    Calcium 9.7 8.4 - 10.2 mg/dL    eGFR 56 ml/min/1.73sq m   Fingerstick Glucose (POCT)    Collection Time: 07/18/24  10:51 AM   Result Value Ref Range    POC Glucose 125 65 - 140 mg/dl   Fingerstick Glucose (POCT)    Collection Time: 07/18/24  3:22 PM   Result Value Ref Range    POC Glucose 113 65 - 140 mg/dl   Fingerstick Glucose (POCT)    Collection Time: 07/18/24  8:38 PM   Result Value Ref Range    POC Glucose 129 65 - 140 mg/dl   Basic metabolic panel    Collection Time: 07/19/24  5:27 AM   Result Value Ref Range    Sodium 148 (H) 135 - 147 mmol/L    Potassium 3.1 (L) 3.5 - 5.3 mmol/L    Chloride 108 96 - 108 mmol/L    CO2 32 21 - 32 mmol/L    ANION GAP 8 4 - 13 mmol/L    BUN 22 5 - 25 mg/dL    Creatinine 0.99 0.60 - 1.30 mg/dL    Glucose 116 65 - 140 mg/dL    Calcium 8.9 8.4 - 10.2 mg/dL    eGFR 63 ml/min/1.73sq m   Fingerstick Glucose (POCT)    Collection Time: 07/19/24  5:30 AM   Result Value Ref Range    POC Glucose 125 65 - 140 mg/dl        This note was not shared with the patient due to reasonable likelihood of causing patient harm        Diandra Latham DO  Psychiatry Resident, PGY-1  Department of Psychiatry and Behavioral Health  [facility]

## 2024-07-19 NOTE — ASSESSMENT & PLAN NOTE
Patient's blood pressure has been elevated due to inconsistent oral intake of pills  Started on clonidine patch over the weekend  Improved now with more consistent oral intake of pills   Reduce carvedilol back to home dose   Continue hydralazine as needed

## 2024-07-19 NOTE — PROGRESS NOTES
[facility]  Progress Note  Name: Meli Nowak I  MRN: [id number]  Unit/Bed#: 7T [facility] 702-01 I Date of Admission: 7/13/2024   Date of Service: 7/19/2024 I Hospital Day: 6    Assessment & Plan   * Schizoaffective disorder, bipolar type (HCC)  Assessment & Plan  History of reactive airway disease diabetes hypertension and mood disorder transferred from [facility] for medical optimization.  7/2/2024: Admitted to [facility].  7/3/2024: Transferred to medical service for concerns of NMS.  Transferred to [facility] for formal neurology evaluation.  MRI negative.  LP without evidence of infection.  Paraneoplastic panel negative.  7/11/2024: Transferred back to [facility] but has had no oral intake and refusing to take medications.  Reviewed outpatient records.  Previously on buspirone 10 mg twice daily, alprazolam XR 2 mg in the morning and 1 mg at bedtime, clozapine 200 mg in the morning and 300 mg at bedtime, paroxetine 60 mg daily  Currently on scheduled ativan with slow titration/resumption of Clozaril which patient appears to be tolerating. Being managed by inpatient psychiatry    Improved oral intake and BP. Still hypernatremic. Continue IVF for 24 hours   Suspect medical clearance 24 hours     Essential (primary) hypertension  Assessment & Plan  Patient's blood pressure has been elevated due to inconsistent oral intake of pills  Started on clonidine patch over the weekend  Improved now with more consistent oral intake of pills   Reduce carvedilol back to home dose   Continue hydralazine as needed    Esophageal reflux  Assessment & Plan  Continue Pepcid    Hypernatremia  Assessment & Plan  Hypernatremia secondary to psychiatric issues and free water deficit   Initially plan was to transfer to medical service for placement of NG tube and start tube feeds/free water.  She has been having improving oral intake.   Continue D5 plus potassium for 24 hours       Reactive airway disease  Assessment &  Plan  Continue with prior to admission on Arnuity Ellipta.  No signs of acute exacerbation    Hyperlipidemia  Assessment & Plan  Continue prior to admission atorvastatin    Type 2 diabetes mellitus (HCC)  Assessment & Plan  Lab Results   Component Value Date    HGBA1C 6.4 (H) 2024     Recent Labs     24  1051 24  1522 24  2038 24  0530   POCGLU 125 113 129 125       Prior to admission on metformin.  Placed on sliding scale insulin during medical hospitalization  Blood sugars adequately controlled. Monitor while on D5 infusion     Hypokalemia-resolved as of 2024  Assessment & Plan  Resolved, replace with IV fluids         VTE Pharmacologic Prophylaxis: lovenox     Patient Centered Rounds:  Patient care rounds were performed with nursing    Time Spent for Care: I have spent a total time of 45 minutes on 24 in caring for this patient including Diagnostic results, Impressions, Counseling / Coordination of care, Documenting in the medical record, Reviewing / ordering tests, medicine, procedures  , Obtaining or reviewing history  , and Communicating with other healthcare professionals .      Current Length of Stay: 6 day(s)    Current Patient Status: Inpatient   Certification Statement: The patient will continue to require additional inpatient hospital stay due to management of electrolyte abnormalities     Discharge Plan: 24 hours     Code Status: Level 1 - Full Code      Subjective:   Patient seen and evaluated at bedside. Improving oral intake.     Objective:     Vitals:   Temp (24hrs), Av.6 °F (37 °C), Min:98.3 °F (36.8 °C), Max:98.7 °F (37.1 °C)    Temp:  [98.3 °F (36.8 °C)-98.7 °F (37.1 °C)] 98.6 °F (37 °C)  HR:  [] 85  Resp:  [18-20] 18  BP: (108-170)/() 119/77  SpO2:  [90 %-94 %] 94 %  Body mass index is 37.67 kg/m².     Input and Output Summary (last 24 hours):       Intake/Output Summary (Last 24 hours) at 2024 1029  Last data filed at 2024  0931  Gross per 24 hour   Intake 2685 ml   Output --   Net 2685 ml       Physical Exam:     Physical Exam  Vitals reviewed.   Constitutional:       General: She is not in acute distress.     Appearance: She is well-developed. She is not toxic-appearing or diaphoretic.   HENT:      Head: Normocephalic and atraumatic.      Mouth/Throat:      Mouth: Mucous membranes are moist.   Eyes:      General: No scleral icterus.     Extraocular Movements: Extraocular movements intact.   Cardiovascular:      Rate and Rhythm: Normal rate and regular rhythm.      Heart sounds: Normal heart sounds.   Pulmonary:      Effort: Pulmonary effort is normal. No respiratory distress.      Breath sounds: Normal breath sounds. No wheezing or rales.   Abdominal:      General: There is no distension.      Palpations: Abdomen is soft.      Tenderness: There is no abdominal tenderness. There is no guarding or rebound.   Musculoskeletal:         General: No swelling, tenderness or deformity.   Skin:     General: Skin is warm and dry.   Neurological:      General: No focal deficit present.      Comments: Appears preoccupied. Poor concentration. Incoherent          Additional Data:     Labs: I have reviewed pertinent results     Results from last 7 days   Lab Units 07/18/24  0615 07/15/24  0622   WBC Thousand/uL 9.45 7.74   HEMOGLOBIN g/dL 14.5 13.0   HEMATOCRIT % 46.8* 43.0   PLATELETS Thousands/uL 255 274   SEGS PCT %  --  56   LYMPHO PCT %  --  30   MONO PCT %  --  13*   EOS PCT %  --  0     Results from last 7 days   Lab Units 07/19/24  0527 07/16/24  0527 07/15/24  0622   SODIUM mmol/L 148*   < > 147   POTASSIUM mmol/L 3.1*   < > 3.5   CHLORIDE mmol/L 108   < > 110*   CO2 mmol/L 32   < > 28   BUN mg/dL 22   < > 18   CREATININE mg/dL 0.99   < > 0.84   ANION GAP mmol/L 8   < > 9   CALCIUM mg/dL 8.9   < > 9.1   ALBUMIN g/dL  --   --  3.5   TOTAL BILIRUBIN mg/dL  --   --  0.80   ALK PHOS U/L  --   --  69   ALT U/L  --   --  19   AST U/L  --   --  22    GLUCOSE RANDOM mg/dL 116   < > 107    < > = values in this interval not displayed.         Results from last 7 days   Lab Units 07/19/24  0530 07/18/24  2038 07/18/24  1522 07/18/24  1051 07/18/24  0606 07/17/24  2046 07/17/24  1524 07/17/24  0940 07/17/24  0545 07/16/24  2025 07/16/24  1549 07/16/24  1106   POC GLUCOSE mg/dl 125 129 113 125 94 104 112 183* 132 96 108 108                 Imaging: I have reviewed pertinent imaging       Recent Cultures (last 7 days):           Last 24 Hours Medication List:   Current Facility-Administered Medications   Medication Dose Route Frequency Provider Last Rate    acetaminophen  650 mg Per NG Tube Q4H PRN Travon Flores, DO      atorvastatin  10 mg Oral Daily Cristiano Cruz, DO      bisacodyl  10 mg Rectal Daily PRN Cristiano Cruz, DO      carvedilol  6.25 mg Oral BID With Meals Cristiano Cruz, DO      cloNIDine  0.1 mg Transdermal Weekly Travon Flores, DO      cloZAPine  75 mg Oral HS Pushpa Fonseca, DO      cyanocobalamin  1,000 mcg Oral Daily Travon Flores, DO      docusate sodium  100 mg Oral BID Jordan C Holter, DO      enoxaparin  40 mg Subcutaneous Daily Travon Flores, DO      famotidine  20 mg Oral BID Travon Mark, DO      FLUoxetine  20 mg Oral Daily Travon Mark, DO      fluticasone  1 puff Inhalation Daily Travon Flores, DO      hydrALAZINE  10 mg Intravenous Q4H PRN Travon Flores, DO      insulin lispro  1-5 Units Subcutaneous 4x Daily (AC & HS) Travon Haywoodng, DO      LORazepam  1 mg Oral 4x Daily Travon Mark, DO      Or    LORazepam  1 mg Intravenous 4x Daily Travon Mark, DO      LORazepam  1 mg Intravenous Q4H PRN Pushpa Harach, DO      Multivitamin  15 mL Oral Daily Travon Amrk, DO      nicotine  1 patch Transdermal Daily Travon Flores, DO      OLANZapine  5 mg Intramuscular Q8H PRN Pushpa Harach, DO      ondansetron  4 mg Intravenous Q4H PRN Travon Flores, DO      potassium chloride 20 mEq in dextrose 5 %  20 mEq Intravenous Continuous Cristiano  DO Nancy 20 mEq (07/19/24 0928)        Today, Patient Was Seen By: Cristiano Cruz DO    ** Please Note: Dictation voice to text software may have been used in the creation of this document. **

## 2024-07-19 NOTE — ASSESSMENT & PLAN NOTE
Lab Results   Component Value Date    HGBA1C 6.4 (H) 05/03/2024     Recent Labs     07/18/24  1051 07/18/24  1522 07/18/24 2038 07/19/24  0530   POCGLU 125 113 129 125       Prior to admission on metformin.  Placed on sliding scale insulin during medical hospitalization  Blood sugars adequately controlled. Monitor while on D5 infusion

## 2024-07-19 NOTE — PHYSICAL THERAPY NOTE
Physical Therapy Evaluation    Patient's Name: Meli Nowak    Admitting Diagnosis  No admission diagnoses are documented for this encounter.    Problem List  Patient Active Problem List   Diagnosis    Medical clearance for psychiatric admission    Type 2 diabetes mellitus (HCC)    Obesity    Psychosis (HCC)    Tobacco abuse    Hyperlipidemia    Reactive airway disease    NICHOLAS (acute kidney injury) (HCC)    SIRS (systemic inflammatory response syndrome) (HCC)    Abnormal urinalysis    Rhabdomyolysis    Abdominal distension    Hypernatremia    Schizoaffective disorder, bipolar type (HCC)    Altered mental status    Agoraphobia with panic disorder    Arthritis    Diastasis recti    Endometrial cancer (HCC)    Esophageal reflux    Hepatic steatosis    HSV-2 infection    Incisional hernia, without obstruction or gangrene    Incontinence of urine in female    Polycystic ovaries    Postmenopausal bleeding    Posttraumatic stress disorder    Right buttock pain    Catatonia    Essential (primary) hypertension       Past Medical History  Past Medical History:   Diagnosis Date    Cognitive impairment     Diabetes mellitus (HCC)     Essential (primary) hypertension     Psychosis (HCC)        Past Surgical History  Past Surgical History:   Procedure Laterality Date     SECTION      CHOLECYSTECTOMY         Recent Imaging  XR chest portable   Final Result by Mariya Pascual MD ([id number])      No acute cardiopulmonary disease. Nothing to indicate aspiration.            Workstation performed: [id number]             Recent Vital Signs  Vitals:    24 1513 24 1556 24 2158 24 0649   BP: 170/82 154/79 108/65 119/77   BP Location: Left leg Left leg Left arm    Pulse: 88  80 85   Resp: 20  20 18   Temp: 98.7 °F (37.1 °C)  98.7 °F (37.1 °C) 98.6 °F (37 °C)   TempSrc: Temporal  Temporal Temporal   SpO2: 92%  91% 94%   Weight:       Height:            24 1230   PT Last Visit   PT Visit Date  07/18/24   Note Type   Note type Evaluation   Pain Assessment   Pain Assessment Tool 0-10   Pain Score No Pain   Restrictions/Precautions   Weight Bearing Precautions Per Order No   Other Precautions Cognitive;Impulsive;1:1;Agitated   Home Living   Type of Home Apartment   Home Layout One level;Stairs to enter with rails   Bathroom Shower/Tub Tub/shower unit   Bathroom Toilet Standard   Prior Function   Level of [place] Independent with ADLs;Independent with IADLS   Lives With Alone   General   Family/Caregiver Present No   Cognition   Overall Cognitive Status Impaired   Arousal/Participation Uncooperative   Orientation Level Unable to assess   Memory Decreased recall of recent events;Decreased recall of precautions   Following Commands Unable to follow one step commands   RLE Assessment   RLE Assessment   (4/5)   LLE Assessment   LLE Assessment   (4/5)   Coordination   Movements are Fluid and Coordinated 0   Coordination and Movement Description pt not able to coordinate movements or maintain seated balance   Sensation   (unable to assess at this time)   Bed Mobility   Rolling R 3  Moderate assistance   Additional items Increased time required   Rolling L 3  Moderate assistance   Additional items Increased time required   Supine to Sit 2  Maximal assistance   Additional items Impulsive;Increased time required;Verbal cues;LE management   Sit to Supine 2  Maximal assistance   Additional items Increased time required;Impulsive;LE management;Verbal cues   Balance   Static Sitting Poor +   Dynamic Sitting Poor +   Static Standing Poor -   Endurance Deficit   Endurance Deficit Yes   Endurance Deficit Description reduced from baseline   Activity Tolerance   Activity Tolerance Treatment limited secondary to agitation;Patient limited by fatigue   Medical Staff Made Aware spoke to Cm   Nurse Made Aware spoke to RN   Assessment   Prognosis Fair   Problem List Decreased strength;Decreased range of motion;Decreased  endurance;Impaired balance;Decreased mobility;Decreased coordination;Decreased cognition;Impaired judgement;Decreased safety awareness;Impaired sensation;Obesity   Barriers to Discharge Inaccessible home environment;Decreased caregiver support   Goals   Patient Goals not able to state at this [facility] Expiration Date 07/29/24   Short Term Goal #1 see eval note   PT Treatment Day 1   Plan   Treatment/Interventions ADL retraining;Functional transfer training;LE strengthening/ROM;Elevations;Therapeutic exercise;Endurance training;Patient/family training;Equipment eval/education;Bed mobility;Gait training;Spoke to nursing;Spoke to case management;OT   PT Frequency 2-3x/wk   Discharge Recommendation   Rehab Resource Intensity Level, PT II (Moderate Resource Intensity)   Equipment Recommended Walker   Walker Package Recommended Wheeled walker   AM-PAC Basic Mobility Inpatient   Turning in Flat Bed Without Bedrails 2   Lying on Back to Sitting on Edge of Flat Bed Without Bedrails 2   Moving Bed to Chair 1   Standing Up From Chair Using Arms 1   Walk in Room 1   Climb 3-5 Stairs With Railing 1   Basic Mobility Inpatient Raw Score 8   Turning Head Towards Sound 2   Follow Simple Instructions 2   Low Function Basic Mobility Raw Score  12   Low Function Basic Mobility Standardized Score  18.33   [facility] Highest Level Of Mobility   -[facility] Goal 3: Sit at edge of bed   -[facility] Achieved 3: Sit at edge of bed   End of Consult   Patient Position at End of Consult Supine;All needs within reach;Bed/Chair alarm activated         ASSESSMENT                                                                                                                     Meli Nowak is a 59 y.o. female admitted to [facility] on 7/13/2024 for Schizoaffective disorder, bipolar type ([facility]). Pt  has a past medical history of Cognitive impairment, Diabetes mellitus ([facility]), Essential (primary) hypertension, and Psychosis ([facility]).. PT was consulted and pt was  seen on 7/19/2024 for mobility assessment and d/c planning.  Impairments limiting pt at this time include decreased ROM, impaired balance, decreased endurance, decreased coordination, increased fall risk, new onset of impairment of functional mobility, decreased ADLS, decreased IADLS, pain, decreased activity tolerance, decreased safety awareness, impaired judgement, decreased sensation, and decreased strength. Pt is currently functioning at a maximum assistance x1 level for bed mobility. The patient's AM-PAC Basic Mobility Inpatient Short Form Raw Score is 8. A Raw score of less than or equal to 16 suggests the patient may benefit from discharge to post-acute rehabilitation services. Please also refer to the recommendation of the Physical Therapist for safe discharge planning.    Goals                                                                                                                                    1) Bed mobility skills with modified independent assistance to facilitate safe return to previous living environment 2) Functional transfers with modified independent assistance to facilitate safe return to previous living environment  3) Ambulation with least restrictive AD modified independent assistance without LOB and stable vitals for safe ambulation home/ community distances. 4) Stair training up/down flight 12 step/s with appropriate rail/s  and modified independent assistance for safe access to previous living environment. 5) Improve balance grades to fair + to reduce risk of falls. 6)Improve LE strength grades by 1 to increase independence w/ transfers and gait.  7) PT for ongoing pt and family education; DME needs and D/C planning to promote highest level of function in least restrictive environment.     Recommendations                                                                                                              Pt will benefit from continued skilled IP PT to address the above  mentioned impairments in order to maximize recovery and increase functional independence when completing mobility and ADLs. See flow sheet for goals and POC.     DME:  TBD pending progress    Discharge Disposition:  Post Acute Rehab Services      Murray Crawford PT, DPT

## 2024-07-19 NOTE — PLAN OF CARE
Problem: PHYSICAL THERAPY ADULT  Goal: Performs mobility at highest level of function for planned discharge setting.  See evaluation for individualized goals.  Description: Treatment/Interventions: ADL retraining, Functional transfer training, LE strengthening/ROM, Elevations, Therapeutic exercise, Endurance training, Patient/family training, Equipment eval/education, Bed mobility, Gait training, Spoke to nursing, Spoke to case management, OT  Equipment Recommended: Walker       See flowsheet documentation for full assessment, interventions and recommendations.  Outcome: Progressing  Note: Prognosis: Fair  Problem List: Decreased strength, Decreased range of motion, Decreased endurance, Impaired balance, Decreased mobility, Decreased coordination, Decreased cognition, Impaired judgement, Decreased safety awareness, Impaired sensation, Obesity     Barriers to Discharge: Inaccessible home environment, Decreased caregiver support     Rehab Resource Intensity Level, PT: II (Moderate Resource Intensity)    See flowsheet documentation for full assessment.

## 2024-07-19 NOTE — PLAN OF CARE
Problem: OCCUPATIONAL THERAPY ADULT  Goal: Performs self-care activities at highest level of function for planned discharge setting.  See evaluation for individualized goals.  Description: Treatment Interventions: ADL retraining, Functional transfer training, UE strengthening/ROM, Endurance training, Continued evaluation, Energy conservation, Activityengagement          See flowsheet documentation for full assessment, interventions and recommendations.   Outcome: Progressing  Note: Limitation: Decreased ADL status, Decreased high-level ADLs, Decreased endurance, Decreased Safe judgement during ADL, Decreased UE strength     Assessment: Pt is a 59 y.o. female seen for OT evaluation s/p admit to [facility] on 7/13/2024 w/ Schizoaffective disorder, bipolar type (HCC).  See medical history above for extensive list of comorbidities affecting pt's functional performance at time of assessment. Personal factors affecting Pt at time of IE include:limited home support, behavioral pattern, and health management . Upon evaluation: Pt agitated, not following directions or commands.  Pt attempting to get OOB. Pt briefly came to standing position impulsively given [place] however with uncontrolled return to bed. Pt required maxA to manage toileting from supine position. Pt's primary barrier(s) at this time: decreased engagement, safety, cognition, as well as endurance and balance deficits. The following deficits impact occupational performance: weakness, decreased strength, decreased balance, decreased tolerance, impaired memory, impaired sequencing, impaired problem solving, and decreased safety awareness. Pt to benefit from continued skilled OT services while in the hospital to address deficits as defined above and maximize level of functional independence w ADL's and functional mobility. Occupational performance areas to address include: grooming, bathing/shower, toilet hygiene, dressing, health maintenance, functional mobility, and  clothing management. From OT standpoint, recommendation at time of d/c would be moderate resource intensity.       Rehab Resource Intensity Level, OT: II (Moderate Resource Intensity)

## 2024-07-19 NOTE — CASE MANAGEMENT
Case Management Discharge Planning Note    Patient name Meli Nowak  Location 7T [facility] 702/7T [facility] 702-01 MRN [id number]  : 1965 Date 2024       Current Admission Date: 2024  Current Admission Diagnosis:Schizoaffective disorder, bipolar type (HCC)   Patient Active Problem List    Diagnosis Date Noted Date Diagnosed    Essential (primary) hypertension 2024     Catatonia 2024     Arthritis 2024     Altered mental status 2024     Hypernatremia 2024     Schizoaffective disorder, bipolar type (HCC) 2024     Medical clearance for psychiatric admission 2024     Type 2 diabetes mellitus (HCC) 2024     Obesity 2024     Psychosis ([facility]) 2024     Tobacco abuse 2024     Hyperlipidemia 2024     Reactive airway disease 2024     NICHOLAS (acute kidney injury) (HCC) 2024     SIRS (systemic inflammatory response syndrome) ([facility]) 2024     Abnormal urinalysis 2024     Rhabdomyolysis 2024     Abdominal distension 2024     Diastasis recti 2022     Incisional hernia, without obstruction or gangrene 2022     Right buttock pain 2021     Endometrial cancer (HCC) 2021     Postmenopausal bleeding 2021     Hepatic steatosis 03/10/2020     HSV-2 infection 2017     Incontinence of urine in female 2015     Polycystic ovaries 2015     Esophageal reflux 2014     Posttraumatic stress disorder 10/28/2013     Agoraphobia with panic disorder 10/10/2011       LOS (days): 6  Geometric Mean LOS (GMLOS) (days): 2.6  Days to GMLOS:-3.3     OBJECTIVE:  Risk of Unplanned Readmission Score: 30.79         Current admission status: Inpatient   Preferred Pharmacy:   UNKNOWN - FOLLOW UP PRIOR TO DISCHARGE TO E-PRESCRIBE  No address on file      Primary Care Provider: Adriana Bradford MD    Primary Insurance: MEDICARE  Secondary Insurance:     DISCHARGE DETAILS:      Other  Referral/Resources/Interventions Provided:  Interventions: Inpatient Behavioral Health  Referral Comments: 304 hearing 7/19 upheld for 90 days      Treatment Team Recommendation: Inpatient Behavioral Health       Additional Comments: 304 hearing this am in [facility] [address].  304 upheld for 90 days waiting for singned order from court.  Call to [facility] stay and left VM requesting CB.  Met with patient remains catonic with eyes open 1:1 at bedside.  Per JAMES Cruz tentative discharge 24 hrs

## 2024-07-19 NOTE — ASSESSMENT & PLAN NOTE
Hypernatremia secondary to psychiatric issues and free water deficit   Initially plan was to transfer to medical service for placement of NG tube and start tube feeds/free water.  She has been having improving oral intake.   Continue D5 plus potassium for 24 hours

## 2024-07-19 NOTE — ASSESSMENT & PLAN NOTE
History of reactive airway disease diabetes hypertension and mood disorder transferred from [facility] for medical optimization.  7/2/2024: Admitted to [facility].  7/3/2024: Transferred to medical service for concerns of NMS.  Transferred to [facility] for formal neurology evaluation.  MRI negative.  LP without evidence of infection.  Paraneoplastic panel negative.  7/11/2024: Transferred back to [facility] but has had no oral intake and refusing to take medications.  Reviewed outpatient records.  Previously on buspirone 10 mg twice daily, alprazolam XR 2 mg in the morning and 1 mg at bedtime, clozapine 200 mg in the morning and 300 mg at bedtime, paroxetine 60 mg daily  Currently on scheduled ativan with slow titration/resumption of Clozaril which patient appears to be tolerating. Being managed by inpatient psychiatry    Improved oral intake and BP. Still hypernatremic. Continue IVF for 24 hours   Suspect medical clearance 24 hours

## 2024-07-19 NOTE — PLAN OF CARE
Problem: Nutrition/Hydration-ADULT  Goal: Nutrient/Hydration intake appropriate for improving, restoring or maintaining nutritional needs  Description: Monitor and assess patient's nutrition/hydration status for malnutrition. Collaborate with interdisciplinary team and initiate plan and interventions as ordered.  Monitor patient's weight and dietary intake as ordered or per policy. Utilize nutrition screening tool and intervene as necessary. Determine patient's food preferences and provide high-protein, high-caloric foods as appropriate.     INTERVENTIONS:  - Monitor oral intake, urinary output, labs, and treatment plans  - Assess nutrition and hydration status and recommend course of action  - Evaluate amount of meals eaten  - Assist patient with eating if necessary   - Allow adequate time for meals  - Recommend/ encourage appropriate diets, oral nutritional supplements, and vitamin/mineral supplements  - Order, calculate, and assess calorie counts as needed  - Recommend, monitor, and adjust tube feedings and TPN/PPN based on assessed needs  - Assess need for intravenous fluids  - Provide specific nutrition/hydration education as appropriate  - Include patient/family/caregiver in decisions related to nutrition  Outcome: Progressing     Problem: METABOLIC, FLUID AND ELECTROLYTES - ADULT  Goal: Electrolytes maintained within normal limits  Description: INTERVENTIONS:  - Monitor labs and assess patient for signs and symptoms of electrolyte imbalances  - Administer electrolyte replacement as ordered  - Monitor response to electrolyte replacements, including repeat lab results as appropriate  - Instruct patient on fluid and nutrition as appropriate  Outcome: Progressing  Goal: Glucose maintained within target range  Description: INTERVENTIONS:  - Monitor Blood Glucose as ordered  - Assess for signs and symptoms of hyperglycemia and hypoglycemia  - Administer ordered medications to maintain glucose within target range  -  Assess nutritional intake and initiate nutrition service referral as needed  Outcome: Progressing     Problem: Knowledge Deficit  Goal: Patient/family/caregiver demonstrates understanding of disease process, treatment plan, medications, and discharge instructions  Description: Complete learning assessment and assess knowledge base.  Interventions:  - Provide teaching at level of understanding  - Provide teaching via preferred learning methods  Outcome: Progressing     Problem: DISCHARGE PLANNING  Goal: Discharge to home or other facility with appropriate resources  Description: INTERVENTIONS:  - Identify barriers to discharge w/patient and caregiver  - Arrange for needed discharge resources and transportation as appropriate  - Identify discharge learning needs (meds, wound care, etc.)  - Arrange for interpretive services to assist at discharge as needed  - Refer to Case Management Department for coordinating discharge planning if the patient needs post-hospital services based on physician/advanced practitioner order or complex needs related to functional status, cognitive ability, or social support system  Outcome: Progressing

## 2024-07-20 LAB
ANION GAP SERPL CALCULATED.3IONS-SCNC: 9 MMOL/L (ref 4–13)
BUN SERPL-MCNC: 11 MG/DL (ref 5–25)
CALCIUM SERPL-MCNC: 9.1 MG/DL (ref 8.4–10.2)
CHLORIDE SERPL-SCNC: 103 MMOL/L (ref 96–108)
CO2 SERPL-SCNC: 28 MMOL/L (ref 21–32)
CREAT SERPL-MCNC: 0.81 MG/DL (ref 0.6–1.3)
GFR SERPL CREATININE-BSD FRML MDRD: 80 ML/MIN/1.73SQ M
GLUCOSE SERPL-MCNC: 116 MG/DL (ref 65–140)
GLUCOSE SERPL-MCNC: 127 MG/DL (ref 65–140)
GLUCOSE SERPL-MCNC: 168 MG/DL (ref 65–140)
GLUCOSE SERPL-MCNC: 194 MG/DL (ref 65–140)
GLUCOSE SERPL-MCNC: 98 MG/DL (ref 65–140)
POTASSIUM SERPL-SCNC: 4.5 MMOL/L (ref 3.5–5.3)
SODIUM SERPL-SCNC: 140 MMOL/L (ref 135–147)

## 2024-07-20 PROCEDURE — 80048 BASIC METABOLIC PNL TOTAL CA: CPT | Performed by: INTERNAL MEDICINE

## 2024-07-20 PROCEDURE — 99232 SBSQ HOSP IP/OBS MODERATE 35: CPT | Performed by: INTERNAL MEDICINE

## 2024-07-20 PROCEDURE — 82948 REAGENT STRIP/BLOOD GLUCOSE: CPT

## 2024-07-20 RX ADMIN — LORAZEPAM 1 MG: 1 TABLET ORAL at 11:45

## 2024-07-20 RX ADMIN — CARVEDILOL 6.25 MG: 6.25 TABLET, FILM COATED ORAL at 09:37

## 2024-07-20 RX ADMIN — INSULIN LISPRO 1 UNITS: 100 INJECTION, SOLUTION INTRAVENOUS; SUBCUTANEOUS at 11:14

## 2024-07-20 RX ADMIN — DOCUSATE SODIUM 100 MG: 100 CAPSULE, LIQUID FILLED ORAL at 18:19

## 2024-07-20 RX ADMIN — FAMOTIDINE 20 MG: 40 POWDER, FOR SUSPENSION ORAL at 18:21

## 2024-07-20 RX ADMIN — POTASSIUM CHLORIDE 20 MEQ: 2 INJECTION, SOLUTION, CONCENTRATE INTRAVENOUS at 03:46

## 2024-07-20 RX ADMIN — FLUOXETINE 20 MG: 20 SOLUTION ORAL at 09:36

## 2024-07-20 RX ADMIN — NICOTINE 1 PATCH: 7 PATCH, EXTENDED RELEASE TRANSDERMAL at 09:38

## 2024-07-20 RX ADMIN — Medication 15 ML: at 09:35

## 2024-07-20 RX ADMIN — CYANOCOBALAMIN TAB 1000 MCG 1000 MCG: 1000 TAB at 09:37

## 2024-07-20 RX ADMIN — CARVEDILOL 6.25 MG: 6.25 TABLET, FILM COATED ORAL at 16:48

## 2024-07-20 RX ADMIN — LORAZEPAM 1 MG: 1 TABLET ORAL at 09:37

## 2024-07-20 RX ADMIN — ENOXAPARIN SODIUM 40 MG: 40 INJECTION SUBCUTANEOUS at 09:35

## 2024-07-20 RX ADMIN — DOCUSATE SODIUM 100 MG: 100 CAPSULE, LIQUID FILLED ORAL at 09:37

## 2024-07-20 RX ADMIN — ATORVASTATIN CALCIUM 10 MG: 10 TABLET, FILM COATED ORAL at 09:37

## 2024-07-20 RX ADMIN — CLONIDINE 0.1 MG: 0.1 PATCH TRANSDERMAL at 11:13

## 2024-07-20 RX ADMIN — CLOZAPINE 75 MG: 25 TABLET ORAL at 21:49

## 2024-07-20 RX ADMIN — FAMOTIDINE 20 MG: 40 POWDER, FOR SUSPENSION ORAL at 09:36

## 2024-07-20 RX ADMIN — LORAZEPAM 1 MG: 1 TABLET ORAL at 21:49

## 2024-07-20 RX ADMIN — LORAZEPAM 1 MG: 1 TABLET ORAL at 18:20

## 2024-07-20 NOTE — ASSESSMENT & PLAN NOTE
Lab Results   Component Value Date    HGBA1C 6.4 (H) 05/03/2024     Recent Labs     07/19/24  1602 07/19/24  2115 07/20/24  0524 07/20/24  1051   POCGLU 124 139 127 168*       Prior to admission on metformin.  Placed on sliding scale insulin during medical hospitalization  Blood sugars adequately controlled.

## 2024-07-20 NOTE — ASSESSMENT & PLAN NOTE
History of reactive airway disease diabetes hypertension and mood disorder transferred from [facility] for medical optimization.  7/2/2024: Admitted to [facility].  7/3/2024: Transferred to medical service for concerns of NMS.  Transferred to [facility] for formal neurology evaluation.  MRI negative.  LP without evidence of infection.  Paraneoplastic panel negative.  7/11/2024: Transferred back to [facility] but has had no oral intake and refusing to take medications.  Reviewed outpatient records.  Previously on buspirone 10 mg twice daily, alprazolam XR 2 mg in the morning and 1 mg at bedtime, clozapine 200 mg in the morning and 300 mg at bedtime, paroxetine 60 mg daily  Currently on scheduled ativan with slow titration/resumption of Clozaril which patient appears to be tolerating. Being managed by inpatient psychiatry    BP stable, electrolytes stable.  Patient has been tolerating more of her meals each day.  Medically cleared for discharge to inpatient psychiatry

## 2024-07-20 NOTE — PROGRESS NOTES
[facility]  Progress Note  Name: Meli Nowak I  MRN: [id number]  Unit/Bed#: 7T [facility] 702-01 I Date of Admission: 7/13/2024   Date of Service: 7/20/2024 I Hospital Day: 7    Assessment & Plan   * Schizoaffective disorder, bipolar type (HCC)  Assessment & Plan  History of reactive airway disease diabetes hypertension and mood disorder transferred from [facility] for medical optimization.  7/2/2024: Admitted to [facility].  7/3/2024: Transferred to medical service for concerns of NMS.  Transferred to [facility] for formal neurology evaluation.  MRI negative.  LP without evidence of infection.  Paraneoplastic panel negative.  7/11/2024: Transferred back to [facility] but has had no oral intake and refusing to take medications.  Reviewed outpatient records.  Previously on buspirone 10 mg twice daily, alprazolam XR 2 mg in the morning and 1 mg at bedtime, clozapine 200 mg in the morning and 300 mg at bedtime, paroxetine 60 mg daily  Currently on scheduled ativan with slow titration/resumption of Clozaril which patient appears to be tolerating. Being managed by inpatient psychiatry    BP stable, electrolytes stable.  Patient has been tolerating more of her meals each day.  Medically cleared for discharge to inpatient psychiatry    Essential (primary) hypertension  Assessment & Plan  Patient's blood pressure has been elevated due to inconsistent oral intake of pills  Started on clonidine patch over the weekend, carvedilol twice daily.  Wound continue this regimen on discharge.  Improved now with more consistent oral intake of pills   Continue hydralazine as needed while inpatient    Esophageal reflux  Assessment & Plan  Continue Pepcid    Hypernatremia  Assessment & Plan  Hypernatremia secondary to psychiatric issues and free water deficit   Initially plan was to transfer to medical service for placement of NG tube and start tube feeds/free water.  She has been having improving oral intake. We have been  supplementing her intake with D5 and electrolytes.   Resolved      Reactive airway disease  Assessment & Plan  Continue with prior to admission on Arnuity Ellipta.  No signs of acute exacerbation    Hyperlipidemia  Assessment & Plan  Continue prior to admission atorvastatin    Type 2 diabetes mellitus (HCC)  Assessment & Plan  Lab Results   Component Value Date    HGBA1C 6.4 (H) 2024     Recent Labs     24  1602 24  2115 24  0524 24  1051   POCGLU 124 139 127 168*       Prior to admission on metformin.  Placed on sliding scale insulin during medical hospitalization  Blood sugars adequately controlled.           VTE Pharmacologic Prophylaxis: lovenox     Patient Centered Rounds:  Patient care rounds were performed with nursing    Time Spent for Care: I have spent a total time of 46 minutes on 24 in caring for this patient including Diagnostic results, Risks and benefits of tx options, Instructions for management, Patient and family education, Importance of tx compliance, Impressions, Counseling / Coordination of care, Documenting in the medical record, Reviewing / ordering tests, medicine, procedures  , Obtaining or reviewing history  , and Communicating with other healthcare professionals .      Current Length of Stay: 7 day(s)    Current Patient Status: Inpatient   Certification Statement: The patient will continue to require additional inpatient hospital stay due to need for placement     Discharge Plan: Medically stable for placement to inpatient psych     Code Status: Level 1 - Full Code      Subjective:   Patient seen and evaluated at bedside. Continues to be incoherent but cooperative with staff. Intermittently interacts/responds appropriately with medical staff     Objective:     Vitals:   Temp (24hrs), Av.5 °F (36.4 °C), Min:97.5 °F (36.4 °C), Max:97.5 °F (36.4 °C)    Temp:  [97.5 °F (36.4 °C)] 97.5 °F (36.4 °C)  HR:  [71-80] 71  Resp:  [20] 20  BP: (133-148)/(73-80)  133/73  SpO2:  [91 %-93 %] 93 %  Body mass index is 37.67 kg/m².     Input and Output Summary (last 24 hours):       Intake/Output Summary (Last 24 hours) at 7/20/2024 1143  Last data filed at 7/20/2024 0957  Gross per 24 hour   Intake 1690 ml   Output --   Net 1690 ml       Physical Exam:     Physical Exam  Vitals reviewed.   Constitutional:       General: She is not in acute distress.     Appearance: She is well-developed. She is not ill-appearing, toxic-appearing or diaphoretic.   HENT:      Head: Normocephalic and atraumatic.      Mouth/Throat:      Mouth: Mucous membranes are moist.   Eyes:      General: No scleral icterus.     Extraocular Movements: Extraocular movements intact.   Cardiovascular:      Rate and Rhythm: Normal rate and regular rhythm.      Heart sounds: Normal heart sounds.   Pulmonary:      Effort: Pulmonary effort is normal. No respiratory distress.      Breath sounds: Normal breath sounds. No wheezing or rales.   Abdominal:      General: There is no distension.      Palpations: Abdomen is soft.      Tenderness: There is no abdominal tenderness. There is no guarding or rebound.   Musculoskeletal:         General: No swelling, tenderness or deformity.   Skin:     General: Skin is warm and dry.   Neurological:      General: No focal deficit present.      Mental Status: She is alert.   Psychiatric:      Comments: Continues to be incoherent but cooperative with staff. Intermittently interacts/responds appropriately with medical staff            Additional Data:     Labs: I have reviewed pertinent results     Results from last 7 days   Lab Units 07/18/24  0615 07/15/24  0622   WBC Thousand/uL 9.45 7.74   HEMOGLOBIN g/dL 14.5 13.0   HEMATOCRIT % 46.8* 43.0   PLATELETS Thousands/uL 255 274   SEGS PCT %  --  56   LYMPHO PCT %  --  30   MONO PCT %  --  13*   EOS PCT %  --  0     Results from last 7 days   Lab Units 07/20/24  0956 07/16/24  0527 07/15/24  0622   SODIUM mmol/L 140   < > 147   POTASSIUM  mmol/L 4.5   < > 3.5   CHLORIDE mmol/L 103   < > 110*   CO2 mmol/L 28   < > 28   BUN mg/dL 11   < > 18   CREATININE mg/dL 0.81   < > 0.84   ANION GAP mmol/L 9   < > 9   CALCIUM mg/dL 9.1   < > 9.1   ALBUMIN g/dL  --   --  3.5   TOTAL BILIRUBIN mg/dL  --   --  0.80   ALK PHOS U/L  --   --  69   ALT U/L  --   --  19   AST U/L  --   --  22   GLUCOSE RANDOM mg/dL 194*   < > 107    < > = values in this interval not displayed.         Results from last 7 days   Lab Units 07/20/24  1051 07/20/24  0524 07/19/24  2115 07/19/24  1602 07/19/24  1038 07/19/24  0530 07/18/24  2038 07/18/24  1522 07/18/24  1051 07/18/24  0606 07/17/24  2046 07/17/24  1524   POC GLUCOSE mg/dl 168* 127 139 124 221* 125 129 113 125 94 104 112                 Imaging: I have reviewed pertinent imaging       Recent Cultures (last 7 days):           Last 24 Hours Medication List:   Current Facility-Administered Medications   Medication Dose Route Frequency Provider Last Rate    acetaminophen  650 mg Per NG Tube Q4H PRN Travon Flores, DO      atorvastatin  10 mg Oral Daily Cristiano Cruz DO      bisacodyl  10 mg Rectal Daily PRN Cristiano Cruz DO      carvedilol  6.25 mg Oral BID With Meals Cristiano Cruz DO      cloNIDine  0.1 mg Transdermal Weekly Travon Flores, DO      cloZAPine  75 mg Oral HS Pushpa Fonseca, DO      cyanocobalamin  1,000 mcg Oral Daily Travon Flores, DO      docusate sodium  100 mg Oral BID Jordan C Holter, DO      enoxaparin  40 mg Subcutaneous Daily Travon Flores, DO      famotidine  20 mg Oral BID Travon Flores, DO      FLUoxetine  20 mg Oral Daily Travon Flores, DO      fluticasone  1 puff Inhalation Daily Travon Flores, DO      hydrALAZINE  10 mg Intravenous Q4H PRN Travon Flores, DO      insulin lispro  1-5 Units Subcutaneous 4x Daily (AC & HS) Travon Flores, DO      LORazepam  1 mg Oral 4x Daily Travon Flores, DO      Or    LORazepam  1 mg Intravenous 4x Daily Travon Flores, DO      LORazepam  1 mg Intravenous Q4H  PRN Pushpa Fonseca,       Multivitamin  15 mL Oral Daily Travon Flores, DO      nicotine  1 patch Transdermal Daily Travon Flores, DO      OLANZapine  5 mg Intramuscular Q8H PRN Pushpa Fonseca,       ondansetron  4 mg Intravenous Q4H PRN Travon Floers, DO          Today, Patient Was Seen By: Cristiano Cruz DO    ** Please Note: Dictation voice to text software may have been used in the creation of this document. **

## 2024-07-20 NOTE — ASSESSMENT & PLAN NOTE
Patient's blood pressure has been elevated due to inconsistent oral intake of pills  Started on clonidine patch over the weekend, carvedilol twice daily.  Wound continue this regimen on discharge.  Improved now with more consistent oral intake of pills   Continue hydralazine as needed while inpatient

## 2024-07-20 NOTE — ASSESSMENT & PLAN NOTE
Continue prior to admission atorvastatin   Marshall from [facility] called stating patient Pati is in clinic with a fever of 105  that won't go down. Marshall states that patient had Nexplanon placed at [employer] and needs it taken out today due to having reactions from the Nexplanon.      Informed Star that Pati has not been seen in our office and needs to establish care for Nexplanon to be taken out.     Advised Marshall to inform patient Pati to head to the  ER to be further evaluated for fever.     Marshall understood and informed pt   No further questions asked.

## 2024-07-20 NOTE — ASSESSMENT & PLAN NOTE
Hypernatremia secondary to psychiatric issues and free water deficit   Initially plan was to transfer to medical service for placement of NG tube and start tube feeds/free water.  She has been having improving oral intake. We have been supplementing her intake with D5 and electrolytes.   Resolved

## 2024-07-20 NOTE — CASE MANAGEMENT
Case Management Discharge Planning Note    Patient name Meli Nowak  Location 7T [facility] 702/7T [facility] 702-01 MRN [id number]  : 1965 Date 2024       Current Admission Date: 2024  Current Admission Diagnosis:Schizoaffective disorder, bipolar type (HCC)   Patient Active Problem List    Diagnosis Date Noted Date Diagnosed    Essential (primary) hypertension 2024     [place] 2024     Arthritis 2024     Altered mental status 2024     Hypernatremia 2024     Schizoaffective disorder, bipolar type (HCC) 2024     Medical clearance for psychiatric admission 2024     Type 2 diabetes mellitus (HCC) 2024     Obesity 2024     Psychosis (HCC) 2024     Tobacco abuse 2024     Hyperlipidemia 2024     Reactive airway disease 2024     NICHOLAS (acute kidney injury) (HCC) 2024     SIRS (systemic inflammatory response syndrome) ([facility]) 2024     Abnormal urinalysis 2024     Rhabdomyolysis 2024     Abdominal distension 2024     Diastasis recti 2022     Incisional hernia, without obstruction or gangrene 2022     Right buttock pain 2021     Endometrial cancer (HCC) 2021     Postmenopausal bleeding 2021     Hepatic steatosis 03/10/2020     HSV-2 infection 2017     Incontinence of urine in female 2015     Polycystic ovaries 2015     Esophageal reflux 2014     Posttraumatic stress disorder 10/28/2013     Agoraphobia with panic disorder 10/10/2011       LOS (days): 7  Geometric Mean LOS (GMLOS) (days): 2.6  Days to GMLOS:-4.4     OBJECTIVE:  Risk of Unplanned Readmission Score: 31.23         Current admission status: Inpatient   Preferred Pharmacy:   UNKNOWN - FOLLOW UP PRIOR TO DISCHARGE TO E-PRESCRIBE  No address on file      Primary Care Provider: Adriana Bradford MD    Primary Insurance: MEDICARE  Secondary Insurance:     DISCHARGE DETAILS:      Additional Comments: LAURIE  informed that Pt is stable for discharge and clear to discharge to inpatient BHU. CM updated [facility] intake and referral of same. CM coordinated Crisis' access to 304. Bed search started. At this time, there are no available beds within [facility], however; bed search continues. CM following through discharge.

## 2024-07-21 LAB
GLUCOSE SERPL-MCNC: 107 MG/DL (ref 65–140)
GLUCOSE SERPL-MCNC: 82 MG/DL (ref 65–140)
GLUCOSE SERPL-MCNC: 89 MG/DL (ref 65–140)
GLUCOSE SERPL-MCNC: 94 MG/DL (ref 65–140)

## 2024-07-21 PROCEDURE — 82948 REAGENT STRIP/BLOOD GLUCOSE: CPT

## 2024-07-21 PROCEDURE — 99232 SBSQ HOSP IP/OBS MODERATE 35: CPT | Performed by: INTERNAL MEDICINE

## 2024-07-21 RX ADMIN — ATORVASTATIN CALCIUM 10 MG: 10 TABLET, FILM COATED ORAL at 08:01

## 2024-07-21 RX ADMIN — ENOXAPARIN SODIUM 40 MG: 40 INJECTION SUBCUTANEOUS at 08:01

## 2024-07-21 RX ADMIN — FAMOTIDINE 20 MG: 40 POWDER, FOR SUSPENSION ORAL at 08:00

## 2024-07-21 RX ADMIN — FLUTICASONE FUROATE 1 PUFF: 100 POWDER RESPIRATORY (INHALATION) at 08:21

## 2024-07-21 RX ADMIN — CARVEDILOL 6.25 MG: 6.25 TABLET, FILM COATED ORAL at 07:50

## 2024-07-21 RX ADMIN — OLANZAPINE 5 MG: 10 INJECTION, POWDER, FOR SOLUTION INTRAMUSCULAR at 18:49

## 2024-07-21 RX ADMIN — FLUOXETINE 20 MG: 20 SOLUTION ORAL at 08:00

## 2024-07-21 RX ADMIN — CLOZAPINE 75 MG: 25 TABLET ORAL at 22:06

## 2024-07-21 RX ADMIN — LORAZEPAM 1 MG: 1 TABLET ORAL at 22:06

## 2024-07-21 RX ADMIN — Medication 15 ML: at 08:00

## 2024-07-21 RX ADMIN — FAMOTIDINE 20 MG: 40 POWDER, FOR SUSPENSION ORAL at 17:19

## 2024-07-21 RX ADMIN — NICOTINE 1 PATCH: 7 PATCH, EXTENDED RELEASE TRANSDERMAL at 08:01

## 2024-07-21 RX ADMIN — CYANOCOBALAMIN TAB 1000 MCG 1000 MCG: 1000 TAB at 08:01

## 2024-07-21 RX ADMIN — LORAZEPAM 1 MG: 1 TABLET ORAL at 17:10

## 2024-07-21 RX ADMIN — LORAZEPAM 1 MG: 1 TABLET ORAL at 08:00

## 2024-07-21 RX ADMIN — CARVEDILOL 6.25 MG: 6.25 TABLET, FILM COATED ORAL at 17:10

## 2024-07-21 RX ADMIN — LORAZEPAM 1 MG: 1 TABLET ORAL at 12:01

## 2024-07-21 NOTE — ASSESSMENT & PLAN NOTE
History of reactive airway disease diabetes hypertension and mood disorder transferred from [facility] for medical optimization.  7/2/2024: Admitted to [facility].  7/3/2024: Transferred to medical service for concerns of NMS.  Transferred to [facility] for formal neurology evaluation.  MRI negative.  LP without evidence of infection.  Paraneoplastic panel negative.  7/11/2024: Transferred back to [facility] but has had no oral intake and refusing to take medications.  Reviewed outpatient records.  Previously on buspirone 10 mg twice daily, alprazolam XR 2 mg in the morning and 1 mg at bedtime, clozapine 200 mg in the morning and 300 mg at bedtime, paroxetine 60 mg daily  Currently on scheduled ativan with slow titration/resumption of Clozaril which patient appears to be tolerating. Being managed by inpatient psychiatry    BP stable, electrolytes stable.  Patient has been tolerating more of her meals each day.  Medically cleared for discharge to inpatient psychiatry

## 2024-07-21 NOTE — PLAN OF CARE
Problem: PAIN - ADULT  Goal: Verbalizes/displays adequate comfort level or baseline comfort level  Description: Interventions:  - Encourage patient to monitor pain and request assistance  - Assess pain using appropriate pain scale  - Administer analgesics based on type and severity of pain and evaluate response  - Implement non-pharmacological measures as appropriate and evaluate response  - Consider cultural and social influences on pain and pain management  - Notify physician/advanced practitioner if interventions unsuccessful or patient reports new pain  Outcome: Progressing     Problem: INFECTION - ADULT  Goal: Absence or prevention of progression during hospitalization  Description: INTERVENTIONS:  - Assess and monitor for signs and symptoms of infection  - Monitor lab/diagnostic results  - Monitor all insertion sites, i.e. indwelling lines, tubes, and drains  - Monitor endotracheal if appropriate and nasal secretions for changes in amount and color  - [place] appropriate cooling/warming therapies per order  - Administer medications as ordered  - Instruct and encourage patient and family to use good hand hygiene technique  - Identify and instruct in appropriate isolation precautions for identified infection/condition  Outcome: Progressing  Goal: Absence of fever/infection during neutropenic period  Description: INTERVENTIONS:  - Monitor WBC    Outcome: Progressing     Problem: SAFETY ADULT  Goal: Patient will remain free of falls  Description: INTERVENTIONS:  - Educate patient/family on patient safety including physical limitations  - Instruct patient to call for assistance with activity   - Consult OT/PT to assist with strengthening/mobility   - Keep Call bell within reach  - Keep bed low and locked with side rails adjusted as appropriate  - Keep care items and personal belongings within reach  - Initiate and maintain comfort rounds  - Make Fall Risk Sign visible to staff  - Offer Toileting every 2 Hours,  in advance of need  - Initiate/Maintain bed alarm  - Obtain necessary fall risk management equipment: bed alarm  - Apply yellow socks and bracelet for high fall risk patients  - Consider moving patient to room near nurses station  Outcome: Progressing  Goal: Maintain or return to baseline ADL function  Description: INTERVENTIONS:  -  Assess patient's ability to carry out ADLs; assess patient's baseline for ADL function and identify physical deficits which impact ability to perform ADLs (bathing, care of mouth/teeth, toileting, grooming, dressing, etc.)  - Assess/evaluate cause of self-care deficits   - Assess range of motion  - Assess patient's mobility; develop plan if impaired  - Assess patient's need for assistive devices and provide as appropriate  - Encourage maximum independence but intervene and supervise when necessary  - Involve family in performance of ADLs  - Assess for home care needs following discharge   - Consider OT consult to assist with ADL evaluation and planning for discharge  - Provide patient education as appropriate  Outcome: Progressing  Goal: Maintains/Returns to pre admission functional level  Description: INTERVENTIONS:  - Perform AM-PAC 6 Click Basic Mobility/ Daily Activity assessment daily.  - Set and communicate daily mobility goal to care team and patient/family/caregiver.   - Collaborate with rehabilitation services on mobility goals if consulted  - Perform Range of Motion 2 times a day.  - Reposition patient every 2 hours.  - Dangle patient 2 times a day  - Stand patient 2 times a day  - Ambulate patient 2 times a day  - Out of bed to chair 2 times a day   - Out of bed for meals 2 times a day  - Out of bed for toileting  - Record patient progress and toleration of activity level   Outcome: Progressing     Problem: DISCHARGE PLANNING  Goal: Discharge to home or other facility with appropriate resources  Description: INTERVENTIONS:  - Identify barriers to discharge w/patient and  caregiver  - Arrange for needed discharge resources and transportation as appropriate  - Identify discharge learning needs (meds, wound care, etc.)  - Arrange for interpretive services to assist at discharge as needed  - Refer to Case Management Department for coordinating discharge planning if the patient needs post-hospital services based on physician/advanced practitioner order or complex needs related to functional status, cognitive ability, or social support system  Outcome: Progressing     Problem: Knowledge Deficit  Goal: Patient/family/caregiver demonstrates understanding of disease process, treatment plan, medications, and discharge instructions  Description: Complete learning assessment and assess knowledge base.  Interventions:  - Provide teaching at level of understanding  - Provide teaching via preferred learning methods  Outcome: Progressing     Problem: Prexisting or High Potential for Compromised Skin Integrity  Goal: Skin integrity is maintained or improved  Description: INTERVENTIONS:  - Identify patients at risk for skin breakdown  - Assess and monitor skin integrity  - Assess and monitor nutrition and hydration status  - Monitor labs   - Assess for incontinence   - Turn and reposition patient  - Assist with mobility/ambulation  - Relieve pressure over bony prominences  - Avoid friction and shearing  - Provide appropriate hygiene as needed including keeping skin clean and dry  - Evaluate need for skin moisturizer/barrier cream  - Collaborate with interdisciplinary team   - Patient/family teaching  - Consider wound care consult   Outcome: Progressing     Problem: Nutrition/Hydration-ADULT  Goal: Nutrient/Hydration intake appropriate for improving, restoring or maintaining nutritional needs  Description: Monitor and assess patient's nutrition/hydration status for malnutrition. Collaborate with interdisciplinary team and initiate plan and interventions as ordered.  Monitor patient's weight and  dietary intake as ordered or per policy. Utilize nutrition screening tool and intervene as necessary. Determine patient's food preferences and provide high-protein, high-caloric foods as appropriate.     INTERVENTIONS:  - Monitor oral intake, urinary output, labs, and treatment plans  - Assess nutrition and hydration status and recommend course of action  - Evaluate amount of meals eaten  - Assist patient with eating if necessary   - Allow adequate time for meals  - Recommend/ encourage appropriate diets, oral nutritional supplements, and vitamin/mineral supplements  - Order, calculate, and assess calorie counts as needed  - Recommend, monitor, and adjust tube feedings and TPN/PPN based on assessed needs  - Assess need for intravenous fluids  - Provide specific nutrition/hydration education as appropriate  - Include patient/family/caregiver in decisions related to nutrition  Outcome: Progressing     Problem: METABOLIC, FLUID AND ELECTROLYTES - ADULT  Goal: Electrolytes maintained within normal limits  Description: INTERVENTIONS:  - Monitor labs and assess patient for signs and symptoms of electrolyte imbalances  - Administer electrolyte replacement as ordered  - Monitor response to electrolyte replacements, including repeat lab results as appropriate  - Instruct patient on fluid and nutrition as appropriate  Outcome: Progressing  Goal: Glucose maintained within target range  Description: INTERVENTIONS:  - Monitor Blood Glucose as ordered  - Assess for signs and symptoms of hyperglycemia and hypoglycemia  - Administer ordered medications to maintain glucose within target range  - Assess nutritional intake and initiate nutrition service referral as needed  Outcome: Progressing     Problem: MOBILITY - ADULT  Goal: Maintain or return to baseline ADL function  Description: INTERVENTIONS:  -  Assess patient's ability to carry out ADLs; assess patient's baseline for ADL function and identify physical deficits which impact  ability to perform ADLs (bathing, care of mouth/teeth, toileting, grooming, dressing, etc.)  - Assess/evaluate cause of self-care deficits   - Assess range of motion  - Assess patient's mobility; develop plan if impaired  - Assess patient's need for assistive devices and provide as appropriate  - Encourage maximum independence but intervene and supervise when necessary  - Involve family in performance of ADLs  - Assess for home care needs following discharge   - Consider OT consult to assist with ADL evaluation and planning for discharge  - Provide patient education as appropriate  Outcome: Progressing  Goal: Maintains/Returns to pre admission functional level  Description: INTERVENTIONS:  - Perform AM-PAC 6 Click Basic Mobility/ Daily Activity assessment daily.  - Set and communicate daily mobility goal to care team and patient/family/caregiver.   - Collaborate with rehabilitation services on mobility goals if consulted  - Perform Range of Motion 2 times a day.  - Reposition patient every 2 hours.  - Dangle patient 2 times a day  - Stand patient 2 times a day  - Ambulate patient 2 times a day  - Out of bed to chair 2 times a day   - Out of bed for meals 2 times a day  - Out of bed for toileting  - Record patient progress and toleration of activity level   Outcome: Progressing     Problem: MOBILITY - ADULT  Goal: Maintain or return to baseline ADL function  Description: INTERVENTIONS:  -  Assess patient's ability to carry out ADLs; assess patient's baseline for ADL function and identify physical deficits which impact ability to perform ADLs (bathing, care of mouth/teeth, toileting, grooming, dressing, etc.)  - Assess/evaluate cause of self-care deficits   - Assess range of motion  - Assess patient's mobility; develop plan if impaired  - Assess patient's need for assistive devices and provide as appropriate  - Encourage maximum independence but intervene and supervise when necessary  - Involve family in performance  of ADLs  - Assess for home care needs following discharge   - Consider OT consult to assist with ADL evaluation and planning for discharge  - Provide patient education as appropriate  Outcome: Progressing  Goal: Maintains/Returns to pre admission functional level  Description: INTERVENTIONS:  - Perform AM-PAC 6 Click Basic Mobility/ Daily Activity assessment daily.  - Set and communicate daily mobility goal to care team and patient/family/caregiver.   - Collaborate with rehabilitation services on mobility goals if consulted  - Perform Range of Motion 2 times a day.  - Reposition patient every 2 hours.  - Dangle patient 2 times a day  - Stand patient 2 times a day  - Ambulate patient 2 times a day  - Out of bed to chair 2 times a day   - Out of bed for meals 2 times a day  - Out of bed for toileting  - Record patient progress and toleration of activity level   Outcome: Progressing     Problem: SAFETY,RESTRAINT: NV/NON-SELF DESTRUCTIVE BEHAVIOR  Goal: Remains free of harm/injury (restraint for non violent/non self-detsructive behavior)  Description: INTERVENTIONS:  - Instruct patient/family regarding restraint use   - Assess and monitor physiologic and psychological status   - Provide interventions and comfort measures to meet assessed patient needs   - Identify and implement measures to help patient regain control  - Assess readiness for release of restraint   Outcome: Progressing  Goal: Returns to optimal restraint-free functioning  Description: INTERVENTIONS:  - Assess the patient's behavior and symptoms that indicate continued need for restraint  - Identify and implement measures to help patient regain control  - Assess readiness for release of restraint   Outcome: Progressing

## 2024-07-21 NOTE — ASSESSMENT & PLAN NOTE
Lab Results   Component Value Date    HGBA1C 6.4 (H) 05/03/2024     Recent Labs     07/20/24  1536 07/20/24 2032 07/21/24  0601 07/21/24  1053   POCGLU 116 98 89 107       Prior to admission on metformin.  Placed on sliding scale insulin during medical hospitalization  Blood sugars adequately controlled.

## 2024-07-21 NOTE — ASSESSMENT & PLAN NOTE
Hypernatremia secondary to psychiatric issues and free water deficit   Initially plan was to transfer to medical service for placement of NG tube and start tube feeds/free water.  She has been having improving oral intake. We have been supplementing her intake with D5 and electrolytes.   Resolved, Off IVF  Discussed with staff encouraging oral hydration

## 2024-07-21 NOTE — PROGRESS NOTES
[facility]  Progress Note  Name: Meli Nowak I  MRN: [id number]  Unit/Bed#: 7T [facility] 702-01 I Date of Admission: 7/13/2024   Date of Service: 7/21/2024 I Hospital Day: 8    Assessment & Plan   * Schizoaffective disorder, bipolar type (HCC)  Assessment & Plan  History of reactive airway disease diabetes hypertension and mood disorder transferred from [facility] for medical optimization.  7/2/2024: Admitted to [facility].  7/3/2024: Transferred to medical service for concerns of NMS.  Transferred to [facility] for formal neurology evaluation.  MRI negative.  LP without evidence of infection.  Paraneoplastic panel negative.  7/11/2024: Transferred back to [facility] but has had no oral intake and refusing to take medications.  Reviewed outpatient records.  Previously on buspirone 10 mg twice daily, alprazolam XR 2 mg in the morning and 1 mg at bedtime, clozapine 200 mg in the morning and 300 mg at bedtime, paroxetine 60 mg daily  Currently on scheduled ativan with slow titration/resumption of Clozaril which patient appears to be tolerating. Being managed by inpatient psychiatry    BP stable, electrolytes stable.  Patient has been tolerating more of her meals each day.  Medically cleared for discharge to inpatient psychiatry    Essential (primary) hypertension  Assessment & Plan  Patient's blood pressure has been elevated due to inconsistent oral intake of pills  Started on clonidine patch over the weekend, carvedilol twice daily.  Wound continue this regimen on discharge.  Improved now with more consistent oral intake of pills   Continue hydralazine as needed while inpatient    Esophageal reflux  Assessment & Plan  Continue Pepcid    Hypernatremia  Assessment & Plan  Hypernatremia secondary to psychiatric issues and free water deficit   Initially plan was to transfer to medical service for placement of NG tube and start tube feeds/free water.  She has been having improving oral intake. We have been  supplementing her intake with D5 and electrolytes.   Resolved, Off IVF  Discussed with staff encouraging oral hydration       Reactive airway disease  Assessment & Plan  Continue with prior to admission on Arnuity Ellipta.  No signs of acute exacerbation    Hyperlipidemia  Assessment & Plan  Continue prior to admission atorvastatin    Type 2 diabetes mellitus (HCC)  Assessment & Plan  Lab Results   Component Value Date    HGBA1C 6.4 (H) 2024     Recent Labs     24  1536 24  2032 24  0601 24  1053   POCGLU 116 98 89 107       Prior to admission on metformin.  Placed on sliding scale insulin during medical hospitalization  Blood sugars adequately controlled.    Hypokalemia-resolved as of 2024  Assessment & Plan  Resolved, replace with IV fluids         VTE Pharmacologic Prophylaxis: lovenox     Patient Centered Rounds:  Patient care rounds were performed with nursing    Time Spent for Care: I have spent a total time of 42 minutes on 24 in caring for this patient including Diagnostic results, Risks and benefits of tx options, Instructions for management, Patient and family education, Importance of tx compliance, Impressions, Documenting in the medical record, Reviewing / ordering tests, medicine, procedures  , Obtaining or reviewing history  , and Communicating with other healthcare professionals .      Current Length of Stay: 8 day(s)    Current Patient Status: Inpatient   Certification Statement: The patient will continue to require additional inpatient hospital stay due to need for placement to inpatient psych     Discharge Plan: Medically cleared for discharge     Code Status: Level 1 - Full Code      Subjective:   Patient seen and evaluated at bedside. Continues to be suspicious of medical providers. When she responds its often incoherent     Objective:     Vitals:   Temp (24hrs), Av.5 °F (36.4 °C), Min:97.3 °F (36.3 °C), Max:97.6 °F (36.4 °C)    Temp:  [97.3 °F (36.3  °C)-97.6 °F (36.4 °C)] 97.5 °F (36.4 °C)  HR:  [67-70] 70  Resp:  [20] 20  BP: ()/(58-92) 152/59  SpO2:  [94 %-95 %] 95 %  Body mass index is 37.67 kg/m².     Input and Output Summary (last 24 hours):       Intake/Output Summary (Last 24 hours) at 7/21/2024 1123  Last data filed at 7/21/2024 0845  Gross per 24 hour   Intake 360 ml   Output --   Net 360 ml       Physical Exam:     Physical Exam  Vitals reviewed.   Constitutional:       General: She is not in acute distress.     Appearance: She is well-developed. She is not toxic-appearing or diaphoretic.   HENT:      Head: Normocephalic and atraumatic.      Mouth/Throat:      Mouth: Mucous membranes are moist.   Eyes:      General: No scleral icterus.     Extraocular Movements: Extraocular movements intact.   Cardiovascular:      Rate and Rhythm: Normal rate and regular rhythm.      Heart sounds: Normal heart sounds.   Pulmonary:      Effort: Pulmonary effort is normal. No respiratory distress.      Breath sounds: Normal breath sounds. No wheezing or rales.   Abdominal:      General: There is no distension.      Palpations: Abdomen is soft.      Tenderness: There is no abdominal tenderness. There is no guarding or rebound.   Musculoskeletal:         General: No swelling, tenderness or deformity.   Skin:     General: Skin is warm and dry.   Neurological:      General: No focal deficit present.      Mental Status: She is alert. Mental status is at baseline.   Psychiatric:      Comments: Withdrawn, selectively will respond to questions, responses are often incoherent, appears much more calm today         Additional Data:     Labs: I have reviewed pertinent results     Results from last 7 days   Lab Units 07/18/24  0615 07/15/24  0622   WBC Thousand/uL 9.45 7.74   HEMOGLOBIN g/dL 14.5 13.0   HEMATOCRIT % 46.8* 43.0   PLATELETS Thousands/uL 255 274   SEGS PCT %  --  56   LYMPHO PCT %  --  30   MONO PCT %  --  13*   EOS PCT %  --  0     Results from last 7 days   Lab  Units 07/20/24  0956 07/16/24  0527 07/15/24  0622   SODIUM mmol/L 140   < > 147   POTASSIUM mmol/L 4.5   < > 3.5   CHLORIDE mmol/L 103   < > 110*   CO2 mmol/L 28   < > 28   BUN mg/dL 11   < > 18   CREATININE mg/dL 0.81   < > 0.84   ANION GAP mmol/L 9   < > 9   CALCIUM mg/dL 9.1   < > 9.1   ALBUMIN g/dL  --   --  3.5   TOTAL BILIRUBIN mg/dL  --   --  0.80   ALK PHOS U/L  --   --  69   ALT U/L  --   --  19   AST U/L  --   --  22   GLUCOSE RANDOM mg/dL 194*   < > 107    < > = values in this interval not displayed.         Results from last 7 days   Lab Units 07/21/24  1053 07/21/24  0601 07/20/24 2032 07/20/24  1536 07/20/24  1051 07/20/24  0524 07/19/24  2115 07/19/24  1602 07/19/24  1038 07/19/24  0530 07/18/24  2038 07/18/24  1522   POC GLUCOSE mg/dl 107 89 98 116 168* 127 139 124 221* 125 129 113                 Imaging: I have reviewed pertinent imaging       Recent Cultures (last 7 days):           Last 24 Hours Medication List:   Current Facility-Administered Medications   Medication Dose Route Frequency Provider Last Rate    acetaminophen  650 mg Per NG Tube Q4H PRN Travon Flores, DO      atorvastatin  10 mg Oral Daily Cristiano Cruz DO      bisacodyl  10 mg Rectal Daily PRN Cristiano Cruz DO      carvedilol  6.25 mg Oral BID With Meals Cristiano Cruz DO      cloNIDine  0.1 mg Transdermal Weekly Travon Flores, DO      cloZAPine  75 mg Oral HS Pushpa Fonseca, DO      cyanocobalamin  1,000 mcg Oral Daily Travon Flores, DO      docusate sodium  100 mg Oral BID Jordan C Holter, DO      enoxaparin  40 mg Subcutaneous Daily Travon Flores, DO      famotidine  20 mg Oral BID Travon Flores, DO      FLUoxetine  20 mg Oral Daily Travon Flores, DO      fluticasone  1 puff Inhalation Daily Travon Flores, DO      hydrALAZINE  10 mg Intravenous Q4H PRN Travon Flores DO      insulin lispro  1-5 Units Subcutaneous 4x Daily (AC & HS) Travon Flores DO      LORazepam  1 mg Oral 4x Daily Travon Flores DO      Or     LORazepam  1 mg Intravenous 4x Daily Travon Flores, DO      LORazepam  1 mg Intravenous Q4H PRN Pushpa Fonseca,       Multivitamin  15 mL Oral Daily Travon Flores, DO      nicotine  1 patch Transdermal Daily Travon Flores, DO      OLANZapine  5 mg Intramuscular Q8H PRN Pushpa Fonseca, DO      ondansetron  4 mg Intravenous Q4H PRN Travon Flores, DO          Today, Patient Was Seen By: Cristiano Cruz,     ** Please Note: Dictation voice to text software may have been used in the creation of this document. **

## 2024-07-21 NOTE — PLAN OF CARE
Problem: PAIN - ADULT  Goal: Verbalizes/displays adequate comfort level or baseline comfort level  Description: Interventions:  - Encourage patient to monitor pain and request assistance  - Assess pain using appropriate pain scale  - Administer analgesics based on type and severity of pain and evaluate response  - Implement non-pharmacological measures as appropriate and evaluate response  - Consider cultural and social influences on pain and pain management  - Notify physician/advanced practitioner if interventions unsuccessful or patient reports new pain  Outcome: Progressing     Problem: SAFETY ADULT  Goal: Patient will remain free of falls  Description: INTERVENTIONS:  - Educate patient/family on patient safety including physical limitations  - Instruct patient to call for assistance with activity   - Consult OT/PT to assist with strengthening/mobility   - Keep Call bell within reach  - Keep bed low and locked with side rails adjusted as appropriate  - Keep care items and personal belongings within reach  - Initiate and maintain comfort rounds  - Make Fall Risk Sign visible to staff  - Offer Toileting every 2 Hours, in advance of need  - Initiate/Maintain bed alarm  - Apply yellow socks and bracelet for high fall risk patients  - Consider moving patient to room near nurses station  Outcome: Progressing     Problem: INFECTION - ADULT  Goal: Absence or prevention of progression during hospitalization  Description: INTERVENTIONS:  - Assess and monitor for signs and symptoms of infection  - Monitor lab/diagnostic results  - Monitor all insertion sites, i.e. indwelling lines, tubes, and drains  - Monitor endotracheal if appropriate and nasal secretions for changes in amount and color  - [place] appropriate cooling/warming therapies per order  - Administer medications as ordered  - Instruct and encourage patient and family to use good hand hygiene technique  - Identify and instruct in appropriate isolation  precautions for identified infection/condition  Outcome: Progressing

## 2024-07-22 LAB
BASOPHILS # BLD AUTO: 0.06 THOUSANDS/ÂΜL (ref 0–0.1)
BASOPHILS NFR BLD AUTO: 1 % (ref 0–1)
BILIRUB UR QL STRIP: NEGATIVE
CLARITY UR: CLEAR
CLOZAPINE SERPL-MCNC: <75 NG/ML (ref 350–900)
COLOR UR: ABNORMAL
EOSINOPHIL # BLD AUTO: 0.16 THOUSAND/ÂΜL (ref 0–0.61)
EOSINOPHIL NFR BLD AUTO: 2 % (ref 0–6)
ERYTHROCYTE [DISTWIDTH] IN BLOOD BY AUTOMATED COUNT: 15.9 % (ref 11.6–15.1)
GLUCOSE SERPL-MCNC: 109 MG/DL (ref 65–140)
GLUCOSE SERPL-MCNC: 78 MG/DL (ref 65–140)
GLUCOSE SERPL-MCNC: 87 MG/DL (ref 65–140)
GLUCOSE SERPL-MCNC: 88 MG/DL (ref 65–140)
GLUCOSE UR STRIP-MCNC: NEGATIVE MG/DL
HCT VFR BLD AUTO: 40.3 % (ref 34.8–46.1)
HGB BLD-MCNC: 13.1 G/DL (ref 11.5–15.4)
HGB UR QL STRIP.AUTO: NEGATIVE
IMM GRANULOCYTES # BLD AUTO: 0.01 THOUSAND/UL (ref 0–0.2)
IMM GRANULOCYTES NFR BLD AUTO: 0 % (ref 0–2)
KETONES UR STRIP-MCNC: NEGATIVE MG/DL
LEUKOCYTE ESTERASE UR QL STRIP: NEGATIVE
LYMPHOCYTES # BLD AUTO: 2.01 THOUSANDS/ÂΜL (ref 0.6–4.47)
LYMPHOCYTES NFR BLD AUTO: 29 % (ref 14–44)
MCH RBC QN AUTO: 29.4 PG (ref 26.8–34.3)
MCHC RBC AUTO-ENTMCNC: 32.5 G/DL (ref 31.4–37.4)
MCV RBC AUTO: 91 FL (ref 82–98)
MONOCYTES # BLD AUTO: 0.77 THOUSAND/ÂΜL (ref 0.17–1.22)
MONOCYTES NFR BLD AUTO: 11 % (ref 4–12)
NEUTROPHILS # BLD AUTO: 3.94 THOUSANDS/ÂΜL (ref 1.85–7.62)
NEUTS SEG NFR BLD AUTO: 57 % (ref 43–75)
NITRITE UR QL STRIP: NEGATIVE
NRBC BLD AUTO-RTO: 0 /100 WBCS
PH UR STRIP.AUTO: 6 [PH]
PLATELET # BLD AUTO: 192 THOUSANDS/UL (ref 149–390)
PMV BLD AUTO: 10.3 FL (ref 8.9–12.7)
PROT UR STRIP-MCNC: NEGATIVE MG/DL
RBC # BLD AUTO: 4.45 MILLION/UL (ref 3.81–5.12)
SP GR UR STRIP.AUTO: 1.01 (ref 1–1.04)
TSH SERPL DL<=0.05 MIU/L-ACNC: 1.71 UIU/ML (ref 0.45–4.5)
UROBILINOGEN UA: NEGATIVE MG/DL
WBC # BLD AUTO: 6.95 THOUSAND/UL (ref 4.31–10.16)

## 2024-07-22 PROCEDURE — 92526 ORAL FUNCTION THERAPY: CPT

## 2024-07-22 PROCEDURE — 99232 SBSQ HOSP IP/OBS MODERATE 35: CPT | Performed by: PSYCHIATRY & NEUROLOGY

## 2024-07-22 PROCEDURE — 82948 REAGENT STRIP/BLOOD GLUCOSE: CPT

## 2024-07-22 PROCEDURE — 85025 COMPLETE CBC W/AUTO DIFF WBC: CPT

## 2024-07-22 PROCEDURE — 99232 SBSQ HOSP IP/OBS MODERATE 35: CPT | Performed by: HOSPITALIST

## 2024-07-22 PROCEDURE — 81003 URINALYSIS AUTO W/O SCOPE: CPT | Performed by: HOSPITALIST

## 2024-07-22 PROCEDURE — 80159 DRUG ASSAY CLOZAPINE: CPT

## 2024-07-22 PROCEDURE — 84443 ASSAY THYROID STIM HORMONE: CPT | Performed by: HOSPITALIST

## 2024-07-22 RX ORDER — CLOZAPINE 100 MG/1
100 TABLET ORAL
Status: DISCONTINUED | OUTPATIENT
Start: 2024-07-22 | End: 2024-07-23 | Stop reason: HOSPADM

## 2024-07-22 RX ADMIN — ATORVASTATIN CALCIUM 10 MG: 10 TABLET, FILM COATED ORAL at 09:10

## 2024-07-22 RX ADMIN — CARVEDILOL 6.25 MG: 6.25 TABLET, FILM COATED ORAL at 16:09

## 2024-07-22 RX ADMIN — CLOZAPINE 100 MG: 25 TABLET ORAL at 21:03

## 2024-07-22 RX ADMIN — CYANOCOBALAMIN TAB 1000 MCG 1000 MCG: 1000 TAB at 09:10

## 2024-07-22 RX ADMIN — CARVEDILOL 6.25 MG: 6.25 TABLET, FILM COATED ORAL at 08:30

## 2024-07-22 RX ADMIN — LORAZEPAM 1 MG: 1 TABLET ORAL at 18:13

## 2024-07-22 RX ADMIN — LORAZEPAM 1 MG: 2 INJECTION INTRAMUSCULAR; INTRAVENOUS at 01:35

## 2024-07-22 RX ADMIN — OLANZAPINE 5 MG: 10 INJECTION, POWDER, FOR SOLUTION INTRAMUSCULAR at 16:09

## 2024-07-22 RX ADMIN — NICOTINE 1 PATCH: 7 PATCH, EXTENDED RELEASE TRANSDERMAL at 09:10

## 2024-07-22 RX ADMIN — ENOXAPARIN SODIUM 40 MG: 40 INJECTION SUBCUTANEOUS at 09:10

## 2024-07-22 RX ADMIN — LORAZEPAM 1 MG: 1 TABLET ORAL at 09:10

## 2024-07-22 RX ADMIN — Medication 15 ML: at 09:10

## 2024-07-22 RX ADMIN — LORAZEPAM 1 MG: 2 INJECTION INTRAMUSCULAR; INTRAVENOUS at 23:35

## 2024-07-22 RX ADMIN — FLUOXETINE 20 MG: 20 SOLUTION ORAL at 09:13

## 2024-07-22 RX ADMIN — FAMOTIDINE 20 MG: 40 POWDER, FOR SUSPENSION ORAL at 18:13

## 2024-07-22 RX ADMIN — FAMOTIDINE 20 MG: 40 POWDER, FOR SUSPENSION ORAL at 09:10

## 2024-07-22 RX ADMIN — FLUTICASONE FUROATE 1 PUFF: 100 POWDER RESPIRATORY (INHALATION) at 09:10

## 2024-07-22 RX ADMIN — LORAZEPAM 1 MG: 1 TABLET ORAL at 12:30

## 2024-07-22 NOTE — ASSESSMENT & PLAN NOTE
History of reactive airway disease diabetes hypertension and mood disorder transferred from [facility] for medical optimization.  7/2/2024: Admitted to [facility].  7/3/2024: Transferred to medical service for concerns of NMS.  Transferred to [facility] for formal neurology evaluation.  MRI negative.  LP without evidence of infection.  Paraneoplastic panel negative.  7/11/2024: Transferred back to [facility] but has had no oral intake and refusing to take medications.  Reviewed outpatient records.  Previously on buspirone 10 mg twice daily, alprazolam XR 2 mg in the morning and 1 mg at bedtime, clozapine 200 mg in the morning and 300 mg at bedtime, paroxetine 60 mg daily  Currently on scheduled ativan with slow titration/resumption of Clozaril which patient appears to be tolerating. Being managed by inpatient psychiatry    BP stable, electrolytes stable.  Patient has been tolerating more of her meals each day.    Patient is actually been doing much better than when she was admitted.  She is answering questions.  She is eating well.    She is medically cleared to go to the psychiatric unit

## 2024-07-22 NOTE — PROGRESS NOTES
[facility]  Progress Note  Name: Meli Nowak I  MRN: [id number]  Unit/Bed#: 7T [facility] 702-01 I Date of Admission: 7/13/2024   Date of Service: 7/22/2024 I Hospital Day: 9    Assessment & Plan   * Schizoaffective disorder, bipolar type (HCC)  Assessment & Plan  History of reactive airway disease diabetes hypertension and mood disorder transferred from [facility] for medical optimization.  7/2/2024: Admitted to [facility].  7/3/2024: Transferred to medical service for concerns of NMS.  Transferred to [facility] for formal neurology evaluation.  MRI negative.  LP without evidence of infection.  Paraneoplastic panel negative.  7/11/2024: Transferred back to [facility] but has had no oral intake and refusing to take medications.  Reviewed outpatient records.  Previously on buspirone 10 mg twice daily, alprazolam XR 2 mg in the morning and 1 mg at bedtime, clozapine 200 mg in the morning and 300 mg at bedtime, paroxetine 60 mg daily  Currently on scheduled ativan with slow titration/resumption of Clozaril which patient appears to be tolerating. Being managed by inpatient psychiatry    BP stable, electrolytes stable.  Patient has been tolerating more of her meals each day.    Patient is actually been doing much better than when she was admitted.  She is answering questions.  She is eating well.    She is medically cleared to go to the psychiatric unit    Hypernatremia  Assessment & Plan  Hypernatremia secondary to psychiatric issues and free water deficit   She is eating and drinking well now.  Hypernatremia has resolved.    Continue to monitor closely though his psychiatric meds can alter  salt level    Type 2 diabetes mellitus (HCC)  Assessment & Plan  Lab Results   Component Value Date    HGBA1C 6.4 (H) 05/03/2024     Recent Labs     07/21/24  1053 07/21/24  1702 07/21/24  2035 07/22/24  0540   POCGLU 107 82 94 78       Prior to admission on metformin.  Placed on sliding scale insulin during medical  hospitalization  Her blood sugars are easily controlled with diet               Subjective:   Doing much better  No complaints  She is eating well.      Objective:     Vitals:   Temp (24hrs), Av.4 °F (36.3 °C), Min:97 °F (36.1 °C), Max:97.8 °F (36.6 °C)    Temp:  [97 °F (36.1 °C)-97.8 °F (36.6 °C)] 97 °F (36.1 °C)  HR:  [69-85] 85  Resp:  [18-20] 18  BP: (126-178)/() 178/104  SpO2:  [93 %-95 %] 93 %  Body mass index is 37.67 kg/m².     Input and Output Summary (last 24 hours):       Intake/Output Summary (Last 24 hours) at 2024 1011  Last data filed at 2024 0901  Gross per 24 hour   Intake 460 ml   Output --   Net 460 ml       Physical Exam:     Physical Exam  Vitals and nursing note reviewed.   HENT:      Head: Normocephalic and atraumatic.   Eyes:      Pupils: Pupils are equal, round, and reactive to light.   Cardiovascular:      Rate and Rhythm: Normal rate and regular rhythm.      Heart sounds: No murmur heard.     No friction rub. No gallop.   Pulmonary:      Effort: Pulmonary effort is normal.      Breath sounds: Normal breath sounds. No wheezing or rales.   Abdominal:      General: Bowel sounds are normal.      Palpations: Abdomen is soft.      Tenderness: There is no abdominal tenderness.   Musculoskeletal:      Right lower leg: No edema.      Left lower leg: No edema.       .       Additional Data:     Labs:    Results from last 7 days   Lab Units 24  0615   WBC Thousand/uL 9.45   HEMOGLOBIN g/dL 14.5   HEMATOCRIT % 46.8*   PLATELETS Thousands/uL 255     Results from last 7 days   Lab Units 24  0956   POTASSIUM mmol/L 4.5   CHLORIDE mmol/L 103   CO2 mmol/L 28   BUN mg/dL 11   CREATININE mg/dL 0.81   CALCIUM mg/dL 9.1         Results from last 7 days   Lab Units 24  0540 245 24  1702 24  1053 24  0601 24  1536 24  1051 24  0524 24  2115 24  1602 24  1038   POC GLUCOSE mg/dl 78 94 82 107 89 98  116 168* 127 139 124 221*               * I Have Reviewed All Lab Data     Recent Cultures (last 7 days):             Last 24 Hours Medication List:   Current Facility-Administered Medications   Medication Dose Route Frequency Provider Last Rate    acetaminophen  650 mg Per NG Tube Q4H PRN Travon Flores, DO      atorvastatin  10 mg Oral Daily Cristiano Cruz, DO      bisacodyl  10 mg Rectal Daily PRN Cristiano Cruz, DO      carvedilol  6.25 mg Oral BID With Meals Cristiano Cruz, DO      cloNIDine  0.1 mg Transdermal Weekly Travon Flores, DO      cloZAPine  75 mg Oral HS Pushpa Fonseca, DO      cyanocobalamin  1,000 mcg Oral Daily Travon Flores, DO      docusate sodium  100 mg Oral BID Jordan C Holter, DO      enoxaparin  40 mg Subcutaneous Daily Travon Mark, DO      famotidine  20 mg Oral BID Travon Mark, DO      FLUoxetine  20 mg Oral Daily Travon Mark, DO      fluticasone  1 puff Inhalation Daily Travon Flores, DO      hydrALAZINE  10 mg Intravenous Q4H PRN Travon Flores, DO      insulin lispro  1-5 Units Subcutaneous 4x Daily (AC & HS) Travon Flores, DO      LORazepam  1 mg Oral 4x Daily Travon Flores, DO      Or    LORazepam  1 mg Intravenous 4x Daily Travon Mark, DO      LORazepam  1 mg Intravenous Q4H PRN Pushpa Fonseca, DO      Multivitamin  15 mL Oral Daily Travon Mark, DO      nicotine  1 patch Transdermal Daily Travon Mark, DO      OLANZapine  5 mg Intramuscular Q8H PRN Pushpa Fonseca, DO      ondansetron  4 mg Intravenous Q4H PRN Travon Flores, DO           VTE Pharmacologic Prophylaxis:   Pharmacologic: Enoxaparin (Lovenox)      Current Length of Stay: 9 day(s)    Current Patient Status: Inpatient       Discharge Plan: she is medically stable to go to inpatient psychiatric unit.    Code Status: Level 1 - Full Code           Today, Patient Was Seen By: Abran Gallegos DO    ** Please Note: Dictation voice to text software may have been used in the creation of this document. **

## 2024-07-22 NOTE — CASE MANAGEMENT
Case Management Discharge Planning Note    Patient name Meli Nowak  Location 7T [facility] 702/7T [facility] 702-01 MRN [id number]  : 1965 Date 2024       Current Admission Date: 2024  Current Admission Diagnosis:Schizoaffective disorder, bipolar type (HCC)   Patient Active Problem List    Diagnosis Date Noted Date Diagnosed    Essential (primary) hypertension 2024     Catatonia 2024     Arthritis 2024     Altered mental status 2024     Hypernatremia 2024     Schizoaffective disorder, bipolar type (HCC) 2024     Medical clearance for psychiatric admission 2024     Type 2 diabetes mellitus (HCC) 2024     Obesity 2024     Psychosis ([facility]) 2024     Tobacco abuse 2024     Hyperlipidemia 2024     Reactive airway disease 2024     NICHOLAS (acute kidney injury) (HCC) 2024     SIRS (systemic inflammatory response syndrome) ([facility]) 2024     Abnormal urinalysis 2024     Rhabdomyolysis 2024     Abdominal distension 2024     Diastasis recti 2022     Incisional hernia, without obstruction or gangrene 2022     Right buttock pain 2021     Endometrial cancer (HCC) 2021     Postmenopausal bleeding 2021     Hepatic steatosis 03/10/2020     HSV-2 infection 2017     Incontinence of urine in female 2015     Polycystic ovaries 2015     Esophageal reflux 2014     Posttraumatic stress disorder 10/28/2013     Agoraphobia with panic disorder 10/10/2011       LOS (days): 9  Geometric Mean LOS (GMLOS) (days): 2.6  Days to GMLOS:-6.3     OBJECTIVE:  Risk of Unplanned Readmission Score: 31.65         Current admission status: Inpatient   Preferred Pharmacy:   UNKNOWN - FOLLOW UP PRIOR TO DISCHARGE TO E-PRESCRIBE  No address on file      Primary Care Provider: Adriana Bradford MD    Primary Insurance: MEDICARE  Secondary Insurance:     DISCHARGE DETAILS:     Additional Comments:  Message sent to  Intake requesting an update on bed status as pt is medically stable,  was asked to send facesheet - facesheet emailed as requested.  department to follow.

## 2024-07-22 NOTE — PROGRESS NOTES
PSYCHIATRY CONSULT SERVICE  PROGRESS NOTE    Meli Nowak 59 y.o. female MRN: [id number]  Unit/Bed#: 7T [facility] 702-01 Encounter: [id number]     ASSESSMENT / PLAN     Meli Nowak is a 59 y.o. female, with history of schizoaffective disorder, who initially presented to [facility] inpatient behavioral unit on 7/3/2024 due to psychosis. Patient has been transferred to the medical unit multiple times across her admission, initially for symptoms of atypical NMS in the setting of abrupt discontinuation of Clozaril. Patient was transferred to [facility] and received neurological workup, including MRI, LP, and paraneoplastic panel which were all negative. Patient was transferred back [facility] behavioral health unit, and subsequently transferred back to medical floor due to poor oral intake, dehydration, and elevated blood pressure. Patient was restarted on Clozaril and has been improving. Patient appears much more alert and awake, has begun speaking, making meaningful statements, eating, drinking, and taking medications. Plan to increase Clozaril to 100 mg HS tonight. CBC w/ differential and Clozaril level ordered. Patient has been medically cleared, awaiting transfer to adult behavioral health unit.    Principal Psychiatric Problem:  Acute on chronic Schizoaffective disorder (HCC)    Principal Problem:    Schizoaffective disorder, bipolar type (HCC)  Active Problems:    Type 2 diabetes mellitus (HCC)    Hyperlipidemia    Reactive airway disease    Hypernatremia    Esophageal reflux    Essential (primary) hypertension      RECOMMENDED TREATMENT     Discussed plan with primary team as follows:  Hospital labs reviewed.  Collaborate with collaterals for further assessment and disposition as indicated.  Patient is currently admitted under 304 involuntary commitment, to be transferred to adult behavioral health unit.  Recommend the following psychotropic medication regimen at this time:  Increase Clozaril  "from 75 mg to 100 mg HS.   CBC w/ differential ordered every Monday at 2100.  Clozaril level ordered today at 2100.  Continue medical management per primary team.  Observation level: In-person 1:1 continual observation  Please contact our service via [employer] Secure Chat with any additional questions or concerns. If contacting after hours, please call or Epic Secure Chat the on-call team (MISAEL: [phone]) with any questions or concerns.  Please reach out to on-call Psychiatry team via [employer] Secure Chat, or if after hours/Fri/Sat/Sunday contact on-call psychiatric service via [employer] ([phone]) with any questions or concerns.         SUBJECTIVE     Patient was seen and evaluated for continuity of care. Meli demonstrates improvement in speech and oral intake. She says \"hi\" when approached by writer. She reports feeling tired and elaborates \"I just wish everything was good and I can go home.\" Her eye contact has improved and she is following commands. Patient denies SI or HI. She does not respond when attempting to ask about auditory or visual hallucinations. Patient then turns away and closes her eyes, falling back asleep. Does not participate in further discussion.    Psychiatric Review of Systems:  Behavior over the last 24 hours: improved  Sleep: improving  Appetite: improving  Medication side effects: none verbalized  ROS: Complete review of systems is negative except as noted above.    OBJECTIVE     Vital signs in last 24 hours:  Temp:  [97 °F (36.1 °C)-97.8 °F (36.6 °C)] 97 °F (36.1 °C)  HR:  [69-85] 85  Resp:  [18-20] 18  BP: (126-178)/() 178/104    Mental Status Evaluation:  Appearance:  age appropriate, dressed in hospital attire, improved grooming, looks stated age, overweight, increased facial hair   Behavior:  guarded, less agitated, more cooperative, more verbal, improved eye contact   Speech:  more organized, increase in amount of speech   Mood:  \"tired\"   Affect:  constricted   Language: naming " objects   Thought Process:  concrete, more coherent, more logical   Associations: concrete associations   Thought Content:  no overt delusions   Perceptual Disturbances: Does not respond when asked, appears responding to internal stimuli   Risk Potential: Suicidal ideation -  None at present  Homicidal ideation - None at present  Potential for aggression - Not at present   Sensorium:  Appears more oriented, unable to fully assess   Memory:  recent and remote memory: unable to assess due to lack of cooperation   Consciousness:  alert and awake   Attention/Concentration: attention span and concentration: unable to assess due to lack of cooperation   Intellect: unable to assess due to lack of cooperation   Fund of Knowledge: Unable to assess   Insight:  limited   Judgment: limited   Muscle Strength Muscle Tone: normal  normal   Gait/Station: Moving all extremities, gait not assessed   Motor Activity: no abnormal movements           ACTIVE MEDICATIONS     Current Medications:  Current Facility-Administered Medications   Medication Dose Route Frequency Provider Last Rate    acetaminophen  650 mg Per NG Tube Q4H PRN Travon Flores, DO      atorvastatin  10 mg Oral Daily Cristiano Cruz DO      bisacodyl  10 mg Rectal Daily PRN Cristiano Cruz DO      carvedilol  6.25 mg Oral BID With Meals Cristiano Cruz DO      cloNIDine  0.1 mg Transdermal Weekly Travon Flores, DO      cloZAPine  75 mg Oral HS Pushpa Fonseca, DO      cyanocobalamin  1,000 mcg Oral Daily Travon Flores, DO      docusate sodium  100 mg Oral BID Jordan C Holter, DO      enoxaparin  40 mg Subcutaneous Daily Travon Flores, DO      famotidine  20 mg Oral BID Travon Flores, DO      FLUoxetine  20 mg Oral Daily Travon Flores, DO      fluticasone  1 puff Inhalation Daily Travon Flores, DO      hydrALAZINE  10 mg Intravenous Q4H PRN Travon Flores, DO      insulin lispro  1-5 Units Subcutaneous 4x Daily (AC & HS) Travon Flores DO      LORazepam  1 mg Oral  4x Daily Travon Mark, DO      Or    LORazepam  1 mg Intravenous 4x Daily Travon Mark, DO      LORazepam  1 mg Intravenous Q4H PRN Pushpa Harach, DO      Multivitamin  15 mL Oral Daily Travon Mark, DO      nicotine  1 patch Transdermal Daily Travon Mark, DO      OLANZapine  5 mg Intramuscular Q8H PRN Pushpa Harach, DO      ondansetron  4 mg Intravenous Q4H PRN Travon Mark, DO         Behavioral Health Medications: I have personally reviewed all current active medications. Changes as in plan section above.      ADDITIONAL DATA     EKG Results: I have personally reviewed pertinent reports.  No results found for this visit on 07/13/24 (from the past 1000 hour(s)).    Radiology Results: I have personally reviewed pertinent reports. I have personally reviewed pertinent films in PACS.  XR chest portable    Result Date: 7/18/2024  Impression: No acute cardiopulmonary disease. Nothing to indicate aspiration. Workstation performed: [id number]     No Chest XR results available for this patient.     Laboratory Results: I have personally reviewed all pertinent laboratory/tests results.  Recent Results (from the past 48 hour(s))   Fingerstick Glucose (POCT)    Collection Time: 07/20/24  3:36 PM   Result Value Ref Range    POC Glucose 116 65 - 140 mg/dl   Fingerstick Glucose (POCT)    Collection Time: 07/20/24  8:32 PM   Result Value Ref Range    POC Glucose 98 65 - 140 mg/dl   Fingerstick Glucose (POCT)    Collection Time: 07/21/24  6:01 AM   Result Value Ref Range    POC Glucose 89 65 - 140 mg/dl   Fingerstick Glucose (POCT)    Collection Time: 07/21/24 10:53 AM   Result Value Ref Range    POC Glucose 107 65 - 140 mg/dl   Fingerstick Glucose (POCT)    Collection Time: 07/21/24  5:02 PM   Result Value Ref Range    POC Glucose 82 65 - 140 mg/dl   Fingerstick Glucose (POCT)    Collection Time: 07/21/24  8:35 PM   Result Value Ref Range    POC Glucose 94 65 - 140 mg/dl   Fingerstick Glucose (POCT)    Collection Time:  07/22/24  5:40 AM   Result Value Ref Range    POC Glucose 78 65 - 140 mg/dl   Fingerstick Glucose (POCT)    Collection Time: 07/22/24 11:09 AM   Result Value Ref Range    POC Glucose 109 65 - 140 mg/dl        This note was not shared with the patient due to reasonable likelihood of causing patient harm        Diandra Latham DO Psychiatry Resident, PGY-1  Department of Psychiatry and Behavioral Health  [facility]

## 2024-07-22 NOTE — ASSESSMENT & PLAN NOTE
Hypernatremia secondary to psychiatric issues and free water deficit   She is eating and drinking well now.  Hypernatremia has resolved.    Continue to monitor closely though his psychiatric meds can alter  salt level

## 2024-07-22 NOTE — SPEECH THERAPY NOTE
Speech Language/Pathology    Speech/Language Pathology Progress Note    Patient Name: Meli Nowak  Today's Date: 2024                     SLP RECOMMENDATIONS:         Diet: Level 3 Dental soft         Liquids: Thin liquids         Medications: as best tolerated         Aspiration Precautions: upright, slow rate, small bites/sips, feeding assist          Summary:  Pt seen for f/u. Pt noted to have improved alertness and verbal output this session. Pt able to participate in simple conversation with min cues. Pt observed with bites of soft solids ([place]z toast) with slow, but functional mastication. Pt required cueing for liquid wash to clear oral residue. Pt observed with puree with functional manipulation of bolus. Pt observed with large, consecutive sips of thin liquid via straw sips with mild anterior loss and no immediate overt s/s aspiration. PO intake appears slightly improved this session. Pt consumed ~50% of meal tray. Recommend continuing current diet.     Assessment:  Mild oral phase dysphagia characterized by slow mastication and intermittent oral residue    Plan/Recommendations:  Level 3 Dental soft/thin   Aspiration precautions  SLP to follow         Lab Results   Component Value Date    WBC 9.45 2024    HGB 14.5 2024    HCT 46.8 (H) 2024    MCV 96 2024     2024           Problem List  Principal Problem:    Schizoaffective disorder, bipolar type (HCC)  Active Problems:    Type 2 diabetes mellitus (HCC)    Hyperlipidemia    Reactive airway disease    Hypernatremia    Esophageal reflux    Essential (primary) hypertension       Past Medical History  Past Medical History:   Diagnosis Date    Cognitive impairment     Diabetes mellitus (HCC)     Essential (primary) hypertension     Psychosis (HCC)         Past Surgical History  Past Surgical History:   Procedure Laterality Date     SECTION      CHOLECYSTECTOMY

## 2024-07-22 NOTE — PLAN OF CARE
Problem: PAIN - ADULT  Goal: Verbalizes/displays adequate comfort level or baseline comfort level  Description: Interventions:  - Encourage patient to monitor pain and request assistance  - Assess pain using appropriate pain scale  - Administer analgesics based on type and severity of pain and evaluate response  - Implement non-pharmacological measures as appropriate and evaluate response  - Consider cultural and social influences on pain and pain management  - Notify physician/advanced practitioner if interventions unsuccessful or patient reports new pain  Outcome: Progressing     Problem: Prexisting or High Potential for Compromised Skin Integrity  Goal: Skin integrity is maintained or improved  Description: INTERVENTIONS:  - Identify patients at risk for skin breakdown  - Assess and monitor skin integrity  - Assess and monitor nutrition and hydration status  - Monitor labs   - Assess for incontinence   - Turn and reposition patient  - Assist with mobility/ambulation  - Relieve pressure over bony prominences  - Avoid friction and shearing  - Provide appropriate hygiene as needed including keeping skin clean and dry  - Evaluate need for skin moisturizer/barrier cream  - Collaborate with interdisciplinary team   - Patient/family teaching  - Consider wound care consult   Outcome: Progressing     Problem: Nutrition/Hydration-ADULT  Goal: Nutrient/Hydration intake appropriate for improving, restoring or maintaining nutritional needs  Description: Monitor and assess patient's nutrition/hydration status for malnutrition. Collaborate with interdisciplinary team and initiate plan and interventions as ordered.  Monitor patient's weight and dietary intake as ordered or per policy. Utilize nutrition screening tool and intervene as necessary. Determine patient's food preferences and provide high-protein, high-caloric foods as appropriate.     INTERVENTIONS:  - Monitor oral intake, urinary output, labs, and treatment plans  -  Assess nutrition and hydration status and recommend course of action  - Evaluate amount of meals eaten  - Assist patient with eating if necessary   - Allow adequate time for meals  - Recommend/ encourage appropriate diets, oral nutritional supplements, and vitamin/mineral supplements  - Order, calculate, and assess calorie counts as needed  - Recommend, monitor, and adjust tube feedings and TPN/PPN based on assessed needs  - Assess need for intravenous fluids  - Provide specific nutrition/hydration education as appropriate  - Include patient/family/caregiver in decisions related to nutrition  Outcome: Progressing     Problem: DISCHARGE PLANNING  Goal: Discharge to home or other facility with appropriate resources  Description: INTERVENTIONS:  - Identify barriers to discharge w/patient and caregiver  - Arrange for needed discharge resources and transportation as appropriate  - Identify discharge learning needs (meds, wound care, etc.)  - Arrange for interpretive services to assist at discharge as needed  - Refer to Case Management Department for coordinating discharge planning if the patient needs post-hospital services based on physician/advanced practitioner order or complex needs related to functional status, cognitive ability, or social support system  Outcome: Progressing

## 2024-07-22 NOTE — CASE MANAGEMENT
Case Management Discharge Planning Note    Patient name Meli Nowak  Location 7T [facility] 702/7T [facility] 702-01 MRN [id number]  : 1965 Date 2024       Current Admission Date: 2024  Current Admission Diagnosis:Schizoaffective disorder, bipolar type (HCC)   Patient Active Problem List    Diagnosis Date Noted Date Diagnosed    Essential (primary) hypertension 2024     Catatonia 2024     Arthritis 2024     Altered mental status 2024     Hypernatremia 2024     Schizoaffective disorder, bipolar type (HCC) 2024     Medical clearance for psychiatric admission 2024     Type 2 diabetes mellitus (HCC) 2024     Obesity 2024     Psychosis ([facility]) 2024     Tobacco abuse 2024     Hyperlipidemia 2024     Reactive airway disease 2024     NICHOLAS (acute kidney injury) (HCC) 2024     SIRS (systemic inflammatory response syndrome) ([facility]) 2024     Abnormal urinalysis 2024     Rhabdomyolysis 2024     Abdominal distension 2024     Diastasis recti 2022     Incisional hernia, without obstruction or gangrene 2022     Right buttock pain 2021     Endometrial cancer (HCC) 2021     Postmenopausal bleeding 2021     Hepatic steatosis 03/10/2020     HSV-2 infection 2017     Incontinence of urine in female 2015     Polycystic ovaries 2015     Esophageal reflux 2014     Posttraumatic stress disorder 10/28/2013     Agoraphobia with panic disorder 10/10/2011       LOS (days): 9  Geometric Mean LOS (GMLOS) (days): 2.6  Days to GMLOS:-6.4     OBJECTIVE:  Risk of Unplanned Readmission Score: 31.71         Current admission status: Inpatient   Preferred Pharmacy:   UNKNOWN - FOLLOW UP PRIOR TO DISCHARGE TO E-PRESCRIBE  No address on file      Primary Care Provider: Adriana Bradford MD    Primary Insurance: MEDICARE  Secondary Insurance:     DISCHARGE DETAILS:    Additional Comments: Per  BH Intake, pt likely to discharge to  6B for IP BHU tomorrow around 1:00 PM. Pt's daughter, RN, and Dr. Gallegos aware of same. Pt's daughter asking for a call for a medical update, Dr. Gallegos aware of same.

## 2024-07-22 NOTE — ASSESSMENT & PLAN NOTE
Lab Results   Component Value Date    HGBA1C 6.4 (H) 05/03/2024     Recent Labs     07/21/24  1053 07/21/24  1702 07/21/24 2035 07/22/24  0540   POCGLU 107 82 94 78       Prior to admission on metformin.  Placed on sliding scale insulin during medical hospitalization  Her blood sugars are easily controlled with diet

## 2024-07-23 ENCOUNTER — HOSPITAL ENCOUNTER (INPATIENT)
Facility: HOSPITAL | Age: 59
DRG: 885 | End: 2024-07-23
Attending: STUDENT IN AN ORGANIZED HEALTH CARE EDUCATION/TRAINING PROGRAM | Admitting: STUDENT IN AN ORGANIZED HEALTH CARE EDUCATION/TRAINING PROGRAM
Payer: MEDICARE

## 2024-07-23 VITALS
WEIGHT: 240.52 LBS | OXYGEN SATURATION: 92 % | DIASTOLIC BLOOD PRESSURE: 87 MMHG | TEMPERATURE: 97.9 F | HEIGHT: 67 IN | SYSTOLIC BLOOD PRESSURE: 152 MMHG | RESPIRATION RATE: 18 BRPM | BODY MASS INDEX: 37.75 KG/M2 | HEART RATE: 76 BPM

## 2024-07-23 DIAGNOSIS — Z00.8 MEDICAL CLEARANCE FOR PSYCHIATRIC ADMISSION: ICD-10-CM

## 2024-07-23 DIAGNOSIS — R09.02 HYPOXIA: ICD-10-CM

## 2024-07-23 DIAGNOSIS — I10 ESSENTIAL (PRIMARY) HYPERTENSION: ICD-10-CM

## 2024-07-23 DIAGNOSIS — E78.5 HYPERLIPIDEMIA: ICD-10-CM

## 2024-07-23 DIAGNOSIS — F25.0 SCHIZOAFFECTIVE DISORDER, BIPOLAR TYPE (HCC): Primary | ICD-10-CM

## 2024-07-23 PROBLEM — E53.8 VITAMIN B12 DEFICIENCY: Status: ACTIVE | Noted: 2024-07-23

## 2024-07-23 LAB
ATRIAL RATE: 86 BPM
ATRIAL RATE: 87 BPM
GLUCOSE SERPL-MCNC: 101 MG/DL (ref 65–140)
GLUCOSE SERPL-MCNC: 112 MG/DL (ref 65–140)
GLUCOSE SERPL-MCNC: 115 MG/DL (ref 65–140)
GLUCOSE SERPL-MCNC: 128 MG/DL (ref 65–140)
P AXIS: 112 DEGREES
P AXIS: 52 DEGREES
PR INTERVAL: 150 MS
PR INTERVAL: 154 MS
QRS AXIS: 109 DEGREES
QRS AXIS: 62 DEGREES
QRSD INTERVAL: 94 MS
QRSD INTERVAL: 96 MS
QT INTERVAL: 360 MS
QT INTERVAL: 374 MS
QTC INTERVAL: 433 MS
QTC INTERVAL: 447 MS
T WAVE AXIS: 132 DEGREES
T WAVE AXIS: 74 DEGREES
VENTRICULAR RATE: 86 BPM
VENTRICULAR RATE: 87 BPM

## 2024-07-23 PROCEDURE — 93005 ELECTROCARDIOGRAM TRACING: CPT

## 2024-07-23 PROCEDURE — 94760 N-INVAS EAR/PLS OXIMETRY 1: CPT

## 2024-07-23 PROCEDURE — 82948 REAGENT STRIP/BLOOD GLUCOSE: CPT

## 2024-07-23 PROCEDURE — 94640 AIRWAY INHALATION TREATMENT: CPT

## 2024-07-23 PROCEDURE — 97163 PT EVAL HIGH COMPLEX 45 MIN: CPT

## 2024-07-23 PROCEDURE — 93010 ELECTROCARDIOGRAM REPORT: CPT

## 2024-07-23 PROCEDURE — 99239 HOSP IP/OBS DSCHRG MGMT >30: CPT | Performed by: HOSPITALIST

## 2024-07-23 PROCEDURE — 94664 DEMO&/EVAL PT USE INHALER: CPT

## 2024-07-23 PROCEDURE — 99232 SBSQ HOSP IP/OBS MODERATE 35: CPT | Performed by: PSYCHIATRY & NEUROLOGY

## 2024-07-23 RX ORDER — INSULIN LISPRO 100 [IU]/ML
1-5 INJECTION, SOLUTION INTRAVENOUS; SUBCUTANEOUS
Status: DISCONTINUED | OUTPATIENT
Start: 2024-07-23 | End: 2024-08-11

## 2024-07-23 RX ORDER — ONDANSETRON 2 MG/ML
4 INJECTION INTRAMUSCULAR; INTRAVENOUS EVERY 4 HOURS PRN
Status: CANCELLED | OUTPATIENT
Start: 2024-07-23

## 2024-07-23 RX ORDER — LORAZEPAM 1 MG/1
1 TABLET ORAL
Status: DISCONTINUED | OUTPATIENT
Start: 2024-07-23 | End: 2024-12-30 | Stop reason: HOSPADM

## 2024-07-23 RX ORDER — QUETIAPINE FUMARATE 25 MG/1
25 TABLET, FILM COATED ORAL
Status: DISCONTINUED | OUTPATIENT
Start: 2024-07-23 | End: 2024-08-01

## 2024-07-23 RX ORDER — CALCIUM CARB/VITAMIN D3/VIT K1 500-500-40
15 TABLET,CHEWABLE ORAL DAILY
Status: CANCELLED | OUTPATIENT
Start: 2024-07-24

## 2024-07-23 RX ORDER — FLUOXETINE 20 MG/5ML
20 SOLUTION ORAL DAILY
Status: DISCONTINUED | OUTPATIENT
Start: 2024-07-24 | End: 2024-07-25

## 2024-07-23 RX ORDER — LORAZEPAM 1 MG/1
1 TABLET ORAL 4 TIMES DAILY
Status: DISCONTINUED | OUTPATIENT
Start: 2024-07-23 | End: 2024-07-23

## 2024-07-23 RX ORDER — CLONIDINE 0.1 MG/24H
0.1 PATCH, EXTENDED RELEASE TRANSDERMAL WEEKLY
Status: CANCELLED | OUTPATIENT
Start: 2024-07-27

## 2024-07-23 RX ORDER — CARVEDILOL 6.25 MG/1
6.25 TABLET ORAL 2 TIMES DAILY WITH MEALS
Status: DISCONTINUED | OUTPATIENT
Start: 2024-07-23 | End: 2024-12-30 | Stop reason: HOSPADM

## 2024-07-23 RX ORDER — INSULIN LISPRO 100 [IU]/ML
1-5 INJECTION, SOLUTION INTRAVENOUS; SUBCUTANEOUS
Status: CANCELLED | OUTPATIENT
Start: 2024-07-23

## 2024-07-23 RX ORDER — LORAZEPAM 2 MG/ML
1 INJECTION INTRAMUSCULAR
Status: CANCELLED | OUTPATIENT
Start: 2024-07-23

## 2024-07-23 RX ORDER — ENOXAPARIN SODIUM 100 MG/ML
40 INJECTION SUBCUTANEOUS DAILY
Status: CANCELLED | OUTPATIENT
Start: 2024-07-24

## 2024-07-23 RX ORDER — POLYETHYLENE GLYCOL 3350 17 G/17G
17 POWDER, FOR SOLUTION ORAL DAILY PRN
Status: DISCONTINUED | OUTPATIENT
Start: 2024-07-23 | End: 2024-10-04

## 2024-07-23 RX ORDER — FAMOTIDINE 40 MG/5ML
20 POWDER, FOR SUSPENSION ORAL 2 TIMES DAILY
Status: CANCELLED | OUTPATIENT
Start: 2024-07-23

## 2024-07-23 RX ORDER — LORAZEPAM 2 MG/ML
1 INJECTION INTRAMUSCULAR EVERY 4 HOURS PRN
Status: CANCELLED | OUTPATIENT
Start: 2024-07-23

## 2024-07-23 RX ORDER — OLANZAPINE 10 MG/2ML
5 INJECTION, POWDER, FOR SOLUTION INTRAMUSCULAR
Status: DISCONTINUED | OUTPATIENT
Start: 2024-07-23 | End: 2024-08-01

## 2024-07-23 RX ORDER — CLOZAPINE 100 MG/1
100 TABLET ORAL
Status: CANCELLED | OUTPATIENT
Start: 2024-07-23

## 2024-07-23 RX ORDER — POLYETHYLENE GLYCOL 3350 17 G/17G
17 POWDER, FOR SOLUTION ORAL DAILY PRN
Status: CANCELLED | OUTPATIENT
Start: 2024-07-23

## 2024-07-23 RX ORDER — ACETAMINOPHEN 325 MG/1
650 TABLET ORAL EVERY 4 HOURS PRN
Status: DISCONTINUED | OUTPATIENT
Start: 2024-07-23 | End: 2024-12-30 | Stop reason: HOSPADM

## 2024-07-23 RX ORDER — ATORVASTATIN CALCIUM 10 MG/1
10 TABLET, FILM COATED ORAL DAILY
Status: DISCONTINUED | OUTPATIENT
Start: 2024-07-24 | End: 2024-12-30 | Stop reason: HOSPADM

## 2024-07-23 RX ORDER — HYDROXYZINE HYDROCHLORIDE 25 MG/1
25 TABLET, FILM COATED ORAL
Status: CANCELLED | OUTPATIENT
Start: 2024-07-23

## 2024-07-23 RX ORDER — CARVEDILOL 6.25 MG/1
6.25 TABLET ORAL 2 TIMES DAILY WITH MEALS
Status: CANCELLED | OUTPATIENT
Start: 2024-07-23

## 2024-07-23 RX ORDER — QUETIAPINE FUMARATE 50 MG/1
50 TABLET, FILM COATED ORAL
Status: DISCONTINUED | OUTPATIENT
Start: 2024-07-23 | End: 2024-08-01

## 2024-07-23 RX ORDER — LORAZEPAM 1 MG/1
1 TABLET ORAL
Status: CANCELLED | OUTPATIENT
Start: 2024-07-23

## 2024-07-23 RX ORDER — LORAZEPAM 1 MG/1
1 TABLET ORAL 4 TIMES DAILY
Status: CANCELLED | OUTPATIENT
Start: 2024-07-23

## 2024-07-23 RX ORDER — ACETAMINOPHEN 325 MG/1
975 TABLET ORAL EVERY 6 HOURS PRN
Status: DISCONTINUED | OUTPATIENT
Start: 2024-07-23 | End: 2024-12-30 | Stop reason: HOSPADM

## 2024-07-23 RX ORDER — ACETAMINOPHEN 325 MG/1
650 TABLET ORAL EVERY 4 HOURS PRN
Status: CANCELLED | OUTPATIENT
Start: 2024-07-23

## 2024-07-23 RX ORDER — HYDROXYZINE 50 MG/1
50 TABLET, FILM COATED ORAL
Status: CANCELLED | OUTPATIENT
Start: 2024-07-23

## 2024-07-23 RX ORDER — LORAZEPAM 2 MG/ML
1 INJECTION INTRAMUSCULAR 4 TIMES DAILY
Status: CANCELLED | OUTPATIENT
Start: 2024-07-23

## 2024-07-23 RX ORDER — QUETIAPINE FUMARATE 50 MG/1
50 TABLET, FILM COATED ORAL
Status: CANCELLED | OUTPATIENT
Start: 2024-07-23

## 2024-07-23 RX ORDER — BISACODYL 10 MG
10 SUPPOSITORY, RECTAL RECTAL DAILY PRN
Status: CANCELLED | OUTPATIENT
Start: 2024-07-23

## 2024-07-23 RX ORDER — ONDANSETRON 4 MG/1
4 TABLET, ORALLY DISINTEGRATING ORAL EVERY 6 HOURS PRN
Status: DISCONTINUED | OUTPATIENT
Start: 2024-07-23 | End: 2024-12-30 | Stop reason: HOSPADM

## 2024-07-23 RX ORDER — ACETAMINOPHEN 160 MG/5ML
650 SUSPENSION ORAL EVERY 4 HOURS PRN
Status: CANCELLED | OUTPATIENT
Start: 2024-07-23

## 2024-07-23 RX ORDER — LORAZEPAM 2 MG/ML
1 INJECTION INTRAMUSCULAR 4 TIMES DAILY
Status: DISCONTINUED | OUTPATIENT
Start: 2024-07-23 | End: 2024-07-25

## 2024-07-23 RX ORDER — LORAZEPAM 2 MG/ML
1 INJECTION INTRAMUSCULAR 4 TIMES DAILY
Status: DISCONTINUED | OUTPATIENT
Start: 2024-07-23 | End: 2024-07-23

## 2024-07-23 RX ORDER — QUETIAPINE FUMARATE 100 MG/1
100 TABLET, FILM COATED ORAL
Status: DISCONTINUED | OUTPATIENT
Start: 2024-07-23 | End: 2024-08-01

## 2024-07-23 RX ORDER — LORAZEPAM 2 MG/ML
1 INJECTION INTRAMUSCULAR
Status: DISCONTINUED | OUTPATIENT
Start: 2024-07-23 | End: 2024-12-30 | Stop reason: HOSPADM

## 2024-07-23 RX ORDER — HYDROXYZINE HYDROCHLORIDE 50 MG/1
50 TABLET, FILM COATED ORAL
Status: DISCONTINUED | OUTPATIENT
Start: 2024-07-23 | End: 2024-12-30 | Stop reason: HOSPADM

## 2024-07-23 RX ORDER — DOCUSATE SODIUM 100 MG/1
100 CAPSULE, LIQUID FILLED ORAL 2 TIMES DAILY
Status: DISCONTINUED | OUTPATIENT
Start: 2024-07-23 | End: 2024-07-24

## 2024-07-23 RX ORDER — FLUOXETINE 20 MG/5ML
20 SOLUTION ORAL DAILY
Status: CANCELLED | OUTPATIENT
Start: 2024-07-24

## 2024-07-23 RX ORDER — TRAZODONE HYDROCHLORIDE 50 MG/1
50 TABLET, FILM COATED ORAL
Status: DISCONTINUED | OUTPATIENT
Start: 2024-07-23 | End: 2024-09-05

## 2024-07-23 RX ORDER — AMOXICILLIN 250 MG
1 CAPSULE ORAL DAILY PRN
Status: DISCONTINUED | OUTPATIENT
Start: 2024-07-23 | End: 2024-07-29

## 2024-07-23 RX ORDER — FAMOTIDINE 40 MG/5ML
20 POWDER, FOR SUSPENSION ORAL 2 TIMES DAILY
Status: DISCONTINUED | OUTPATIENT
Start: 2024-07-23 | End: 2024-07-25

## 2024-07-23 RX ORDER — OLANZAPINE 10 MG/2ML
5 INJECTION, POWDER, FOR SOLUTION INTRAMUSCULAR
Status: CANCELLED | OUTPATIENT
Start: 2024-07-23

## 2024-07-23 RX ORDER — CALCIUM CARB/VITAMIN D3/VIT K1 500-500-40
15 TABLET,CHEWABLE ORAL DAILY
Status: DISCONTINUED | OUTPATIENT
Start: 2024-07-24 | End: 2024-12-30 | Stop reason: HOSPADM

## 2024-07-23 RX ORDER — LORAZEPAM 1 MG/1
1 TABLET ORAL 4 TIMES DAILY
Status: DISCONTINUED | OUTPATIENT
Start: 2024-07-23 | End: 2024-07-25

## 2024-07-23 RX ORDER — DOCUSATE SODIUM 100 MG/1
100 CAPSULE, LIQUID FILLED ORAL 2 TIMES DAILY
Status: CANCELLED | OUTPATIENT
Start: 2024-07-23

## 2024-07-23 RX ORDER — ATORVASTATIN CALCIUM 10 MG/1
10 TABLET, FILM COATED ORAL DAILY
Status: CANCELLED | OUTPATIENT
Start: 2024-07-24

## 2024-07-23 RX ORDER — TRAZODONE HYDROCHLORIDE 50 MG/1
50 TABLET ORAL
Status: CANCELLED | OUTPATIENT
Start: 2024-07-23

## 2024-07-23 RX ORDER — OLANZAPINE 10 MG/2ML
5 INJECTION, POWDER, FOR SOLUTION INTRAMUSCULAR EVERY 8 HOURS PRN
Status: CANCELLED | OUTPATIENT
Start: 2024-07-23

## 2024-07-23 RX ORDER — QUETIAPINE FUMARATE 25 MG/1
25 TABLET, FILM COATED ORAL
Status: CANCELLED | OUTPATIENT
Start: 2024-07-23

## 2024-07-23 RX ORDER — HYDRALAZINE HYDROCHLORIDE 20 MG/ML
10 INJECTION INTRAMUSCULAR; INTRAVENOUS EVERY 4 HOURS PRN
Status: CANCELLED | OUTPATIENT
Start: 2024-07-23

## 2024-07-23 RX ORDER — IPRATROPIUM BROMIDE AND ALBUTEROL SULFATE 2.5; .5 MG/3ML; MG/3ML
3 SOLUTION RESPIRATORY (INHALATION) EVERY 4 HOURS PRN
Status: DISCONTINUED | OUTPATIENT
Start: 2024-07-23 | End: 2024-11-11

## 2024-07-23 RX ORDER — LANOLIN ALCOHOL/MO/W.PET/CERES
3 CREAM (GRAM) TOPICAL
Status: CANCELLED | OUTPATIENT
Start: 2024-07-23

## 2024-07-23 RX ORDER — QUETIAPINE FUMARATE 100 MG/1
100 TABLET, FILM COATED ORAL
Status: CANCELLED | OUTPATIENT
Start: 2024-07-23

## 2024-07-23 RX ORDER — HYDROXYZINE HYDROCHLORIDE 25 MG/1
25 TABLET, FILM COATED ORAL
Status: DISCONTINUED | OUTPATIENT
Start: 2024-07-23 | End: 2024-12-30 | Stop reason: HOSPADM

## 2024-07-23 RX ORDER — ACETAMINOPHEN 325 MG/1
975 TABLET ORAL EVERY 6 HOURS PRN
Status: CANCELLED | OUTPATIENT
Start: 2024-07-23

## 2024-07-23 RX ORDER — CLOZAPINE 100 MG/1
100 TABLET ORAL
Status: DISCONTINUED | OUTPATIENT
Start: 2024-07-23 | End: 2024-07-24

## 2024-07-23 RX ORDER — AMOXICILLIN 250 MG
1 CAPSULE ORAL DAILY PRN
Status: CANCELLED | OUTPATIENT
Start: 2024-07-23

## 2024-07-23 RX ORDER — CLONIDINE 0.1 MG/24H
0.1 PATCH, EXTENDED RELEASE TRANSDERMAL WEEKLY
Status: DISCONTINUED | OUTPATIENT
Start: 2024-07-27 | End: 2024-08-02

## 2024-07-23 RX ADMIN — IPRATROPIUM BROMIDE AND ALBUTEROL SULFATE 3 ML: 2.5; .5 SOLUTION RESPIRATORY (INHALATION) at 20:14

## 2024-07-23 RX ADMIN — Medication 15 ML: at 08:05

## 2024-07-23 RX ADMIN — LORAZEPAM 1 MG: 1 TABLET ORAL at 11:40

## 2024-07-23 RX ADMIN — LORAZEPAM 1 MG: 1 TABLET ORAL at 21:38

## 2024-07-23 RX ADMIN — ATORVASTATIN CALCIUM 10 MG: 10 TABLET, FILM COATED ORAL at 08:04

## 2024-07-23 RX ADMIN — NICOTINE 1 PATCH: 7 PATCH, EXTENDED RELEASE TRANSDERMAL at 08:04

## 2024-07-23 RX ADMIN — FLUTICASONE FUROATE 1 PUFF: 100 POWDER RESPIRATORY (INHALATION) at 08:07

## 2024-07-23 RX ADMIN — FLUOXETINE 20 MG: 20 SOLUTION ORAL at 08:04

## 2024-07-23 RX ADMIN — LORAZEPAM 1 MG: 2 INJECTION INTRAMUSCULAR; INTRAVENOUS at 17:40

## 2024-07-23 RX ADMIN — CYANOCOBALAMIN TAB 1000 MCG 1000 MCG: 1000 TAB at 08:04

## 2024-07-23 RX ADMIN — ENOXAPARIN SODIUM 40 MG: 40 INJECTION SUBCUTANEOUS at 08:04

## 2024-07-23 RX ADMIN — FAMOTIDINE 20 MG: 40 POWDER, FOR SUSPENSION ORAL at 08:04

## 2024-07-23 RX ADMIN — MELATONIN TAB 3 MG 3 MG: 3 TAB at 21:38

## 2024-07-23 RX ADMIN — CARVEDILOL 6.25 MG: 6.25 TABLET, FILM COATED ORAL at 08:04

## 2024-07-23 RX ADMIN — CLOZAPINE 100 MG: 100 TABLET ORAL at 21:38

## 2024-07-23 RX ADMIN — LORAZEPAM 1 MG: 1 TABLET ORAL at 08:04

## 2024-07-23 NOTE — PROGRESS NOTES
PSYCHIATRY CONSULT SERVICE  PROGRESS NOTE    Meli Nowak 59 y.o. female MRN: [id number]  Unit/Bed#: 7T [facility] 702-01 Encounter: [id number]     ASSESSMENT / PLAN     Meli Nowak is a 59 y.o. female, with history of schizoaffective disorder, who initially presented to [facility] inpatient behavioral health unit on 7/3/2024 for psychosis. Patient has been transferred to the medical floor multiple times during admission, previously for symptoms of atypical NMS in the setting of abrupt discontinuation of Clozaril. Patient was transferred to [facility] and received negative neurological workup including MRI, LP, and paraneoplastic panel. Patient was transferred back to [facility] behavioral health unit, subsequently transferred back to medical floor due to poor oral intake, dehydration, and elevated blood pressure. Patient has been improving since being restarted on Clozaril. She is more cooperative, making meaningful statements, eating, drinking, taking medication, though still appears to be responding to internal stimuli. She is currently tolerating Clozaril 100 mg QHS. ANC of 3.94. Clozaril level <75. Plan to continue upward titration of Clozaril as needed and tolerated. Patient is medically cleared and awaiting transfer back to [address] [place] [address] behavioral health unit.    Principal Psychiatric Problem:  Acute on chronic schizoaffective disorder      Principal Problem:    Schizoaffective disorder, bipolar type (HCC)  Active Problems:    Type 2 diabetes mellitus (HCC)    Hyperlipidemia    Reactive airway disease    Hypernatremia    Esophageal reflux    Essential (primary) hypertension      RECOMMENDED TREATMENT     Discussed plan with primary team as follows:  Hospital labs reviewed.  Collaborate with collaterals for further assessment and disposition as indicated.  Patient is admitted under 304 involuntary commitment, awaiting transfer to  adult behavioral health unit.  Recommend no changes  "to psychotropic medications at this time, will defer to inpatient psychiatry management   Continue medical management per primary team.  Observation level: In-person 1:1 continual observation  Please contact our service via [facility] Secure Chat with any additional questions or concerns. If contacting after hours, please call or TigerText the on-call team (MISAEL: [phone]) with any questions or concerns.  Please reach out to on-call Psychiatry team via [facility] Secure Chat, or if after hours/Fri/Sat/Sunday contact on-call psychiatric service via [employer] ([phone]) with any questions or concerns.         SUBJECTIVE     Patient was seen and evaluated for continuity of care. Meli is alert and awake, calm, appears comfortable, though appears to be responding to internal stimuli, answering questions appropriately. She reports feeling upset because \"they did something with my hair\". She denies SI or HI. She reports auditory hallucinations, \"he told me he is making a ekta pie.\" She appears confused about this, continues to mumble, and then states \"it does not make sense to me.\" She reports she is eating as much as she can, but feels upset. She continues to improve with increased cooperation, oral intake, continues to take medications. Denies any medication side effects.      Psychiatric Review of Systems:  Behavior over the last 24 hours: improved  Sleep: improving  Appetite: improving  Medication side effects: none verbalized  ROS: Complete review of systems is negative except as noted above.    OBJECTIVE     Vital signs in last 24 hours:  Temp:  [97.3 °F (36.3 °C)-97.9 °F (36.6 °C)] 97.9 °F (36.6 °C)  HR:  [66-79] 76  Resp:  [18-19] 18  BP: (113-177)/(59-87) 152/87    Mental Status Evaluation:  Appearance:  age appropriate, dressed in hospital attire, improved grooming, overweight, increased facial hair , appears comfortable in bed   Behavior:  pleasant, more cooperative, improved eye contact, answering questions " "appropriately   Speech:  normal rate, normal volume, more organized   Mood:  \"upset\"   Affect:  constricted   Language: naming objects   Thought Process:  concrete   Associations: concrete associations   Thought Content:  no overt delusions   Perceptual Disturbances: auditory hallucinations, appears responding to internal stimuli   Risk Potential: Suicidal ideation - None at present  Homicidal ideation - None at present  Potential for aggression - Not at present   Sensorium:  Appears more oriented, unable to fully assess   Memory:  recent memory intact   Consciousness:  alert and awake   Attention/Concentration: decreased concentration   Intellect: within normal limits   Fund of Knowledge: awareness of current events: yes   Insight:  improving   Judgment: improving   Muscle Strength Muscle Tone: Did not assess  Did not assess   Gait/Station: Moving all extremities, did not assess gait   Motor Activity: no abnormal movements           ACTIVE MEDICATIONS     Current Medications:  Current Facility-Administered Medications   Medication Dose Route Frequency Provider Last Rate    acetaminophen  650 mg Per NG Tube Q4H PRN Travon Flores, DO      atorvastatin  10 mg Oral Daily Cristiano Cruz, DO      bisacodyl  10 mg Rectal Daily PRN Cristiano Cruz, DO      carvedilol  6.25 mg Oral BID With Meals Cristiano Cruz, DO      cloNIDine  0.1 mg Transdermal Weekly Travon Flores, DO      cloZAPine  100 mg Oral HS Diandra Latham, DO      cyanocobalamin  1,000 mcg Oral Daily Travon Flores, DO      docusate sodium  100 mg Oral BID Jordan C Holter, DO      enoxaparin  40 mg Subcutaneous Daily Travon Flores, DO      famotidine  20 mg Oral BID Travon Flores, DO      FLUoxetine  20 mg Oral Daily Travon Flores, DO      fluticasone  1 puff Inhalation Daily Travon Flores, DO      hydrALAZINE  10 mg Intravenous Q4H PRN Travon Flores, DO      insulin lispro  1-5 Units Subcutaneous 4x Daily (AC & HS) Travon Flores, DO      LORazepam  1 " mg Oral 4x Daily Travon Mark, DO      Or    LORazepam  1 mg Intravenous 4x Daily Travon Mark, DO      LORazepam  1 mg Intravenous Q4H PRN Pushpa Harach, DO      Multivitamin  15 mL Oral Daily Travon Mark, DO      nicotine  1 patch Transdermal Daily Travon Mark, DO      OLANZapine  5 mg Intramuscular Q8H PRN Pushpa Harach, DO      ondansetron  4 mg Intravenous Q4H PRN Travon Mark, DO         Behavioral Health Medications: I have personally reviewed all current active medications. Changes as in plan section above.      ADDITIONAL DATA     EKG Results: I have personally reviewed pertinent reports.  No results found for this visit on 07/13/24 (from the past 1000 hour(s)).    Radiology Results: I have personally reviewed pertinent reports. I have personally reviewed pertinent films in PACS.  XR chest portable    Result Date: 7/18/2024  Impression: No acute cardiopulmonary disease. Nothing to indicate aspiration. Workstation performed: [id number]     No Chest XR results available for this patient.     Laboratory Results: I have personally reviewed all pertinent laboratory/tests results.  Recent Results (from the past 48 hour(s))   Fingerstick Glucose (POCT)    Collection Time: 07/21/24 10:53 AM   Result Value Ref Range    POC Glucose 107 65 - 140 mg/dl   Fingerstick Glucose (POCT)    Collection Time: 07/21/24  5:02 PM   Result Value Ref Range    POC Glucose 82 65 - 140 mg/dl   Fingerstick Glucose (POCT)    Collection Time: 07/21/24  8:35 PM   Result Value Ref Range    POC Glucose 94 65 - 140 mg/dl   Fingerstick Glucose (POCT)    Collection Time: 07/22/24  5:40 AM   Result Value Ref Range    POC Glucose 78 65 - 140 mg/dl   UA w Reflex to Microscopic w Reflex to Culture    Collection Time: 07/22/24 10:37 AM    Specimen: Urine, Straight Cath   Result Value Ref Range    Color, UA Miley (A) Straw, Yellow, Pale Yellow    Clarity, UA Clear Clear, Other    Specific Gravity, UA 1.015 1.003 - 1.040    pH, UA 6.0 4.5, 5.0,  5.5, 6.0, 6.5, 7.0, 7.5, 8.0    Leukocytes, UA Negative Negative    Nitrite, UA Negative Negative    Protein, UA Negative Negative mg/dl    Glucose, UA Negative Negative mg/dl    Ketones, UA Negative Negative mg/dl    Bilirubin, UA Negative Negative    Occult Blood, UA Negative Negative    UROBILINOGEN UA Negative 1.0, Negative mg/dL   Fingerstick Glucose (POCT)    Collection Time: 07/22/24 11:09 AM   Result Value Ref Range    POC Glucose 109 65 - 140 mg/dl   TSH, 3rd generation with Free T4 reflex    Collection Time: 07/22/24 11:58 AM   Result Value Ref Range    TSH 3RD GENERATON 1.709 0.450 - 4.500 uIU/mL   Fingerstick Glucose (POCT)    Collection Time: 07/22/24  3:40 PM   Result Value Ref Range    POC Glucose 88 65 - 140 mg/dl   CBC and differential    Collection Time: 07/22/24  7:54 PM   Result Value Ref Range    WBC 6.95 4.31 - 10.16 Thousand/uL    RBC 4.45 3.81 - 5.12 Million/uL    Hemoglobin 13.1 11.5 - 15.4 g/dL    Hematocrit 40.3 34.8 - 46.1 %    MCV 91 82 - 98 fL    MCH 29.4 26.8 - 34.3 pg    MCHC 32.5 31.4 - 37.4 g/dL    RDW 15.9 (H) 11.6 - 15.1 %    MPV 10.3 8.9 - 12.7 fL    Platelets 192 149 - 390 Thousands/uL    nRBC 0 /100 WBCs    Segmented % 57 43 - 75 %    Immature Grans % 0 0 - 2 %    Lymphocytes % 29 14 - 44 %    Monocytes % 11 4 - 12 %    Eosinophils Relative 2 0 - 6 %    Basophils Relative 1 0 - 1 %    Absolute Neutrophils 3.94 1.85 - 7.62 Thousands/µL    Absolute Immature Grans 0.01 0.00 - 0.20 Thousand/uL    Absolute Lymphocytes 2.01 0.60 - 4.47 Thousands/µL    Absolute Monocytes 0.77 0.17 - 1.22 Thousand/µL    Eosinophils Absolute 0.16 0.00 - 0.61 Thousand/µL    Basophils Absolute 0.06 0.00 - 0.10 Thousands/µL   Clozapine-[facility]    Collection Time: 07/22/24  7:54 PM   Result Value Ref Range    Clozapine Lvl <75 (L) 350 - 900 ng/mL   Fingerstick Glucose (POCT)    Collection Time: 07/22/24  8:37 PM   Result Value Ref Range    POC Glucose 87 65 - 140 mg/dl   Fingerstick Glucose (POCT)     Collection Time: 07/23/24  5:31 AM   Result Value Ref Range    POC Glucose 112 65 - 140 mg/dl        This note was not shared with the patient due to reasonable likelihood of causing patient harm        Diandra Latham DO Psychiatry Resident, PGY-1  Department of Psychiatry and Behavioral Health  [facility]

## 2024-07-23 NOTE — ASSESSMENT & PLAN NOTE
Prior to her inpatient admission patient was an active long-term tobacco abuser  Patient has a nicotine patch at 7 mg, continue  Smoking cessation education

## 2024-07-23 NOTE — CASE MANAGEMENT
CM spoke with the patient, her ICM of 12 years, Darling, was bedside. Darling reports she feels the patient looks better but that she is worried about her being deconditioned due to sitting in the hospital bed for an extended period of time. Darling requested PT/OT evaluations. The patient was verbal with CM, but appeared confused at this time.

## 2024-07-23 NOTE — ASSESSMENT & PLAN NOTE
Lab Results   Component Value Date    HGBA1C 6.4 (H) 05/03/2024       Recent Labs     07/23/24  0531 07/23/24  1023 07/23/24  1733 07/23/24  2142   POCGLU 112 128 115 101       Blood Sugar Average: Last 72 hrs:  (P) 108  Monitor ACCU checks AC + HS with lispro insulin sliding scale  Encourage oral intake

## 2024-07-23 NOTE — PLAN OF CARE
Problem: DISCHARGE PLANNING - CARE MANAGEMENT  Goal: Discharge to post-acute care or home with appropriate resources  Description: INTERVENTIONS:  - Conduct assessment to determine patient/family and health care team treatment goals, and need for post-acute services based on payer coverage, community resources, and patient preferences, and barriers to discharge  - Address psychosocial, clinical, and financial barriers to discharge as identified in assessment in conjunction with the patient/family and health care team  - Arrange appropriate level of post-acute services according to patient’s   needs and preference and payer coverage in collaboration with the physician and health care team  - Communicate with and update the patient/family, physician, and health care team regarding progress on the discharge plan  - Arrange appropriate transportation to post-acute venues  Outcome: Progressing     Problem: Prexisting or High Potential for Compromised Skin Integrity  Goal: Skin integrity is maintained or improved  Description: INTERVENTIONS:  - Identify patients at risk for skin breakdown  - Assess and monitor skin integrity  - Assess and monitor nutrition and hydration status  - Monitor labs   - Assess for incontinence   - Turn and reposition patient  - Assist with mobility/ambulation  - Relieve pressure over bony prominences  - Avoid friction and shearing  - Provide appropriate hygiene as needed including keeping skin clean and dry  - Evaluate need for skin moisturizer/barrier cream  - Collaborate with interdisciplinary team   - Patient/family teaching  - Consider wound care consult   Outcome: Progressing     Problem: Alteration in Thoughts and Perception  Goal: Treatment Goal: Gain control of psychotic behaviors/thinking, reduce/eliminate presenting symptoms and demonstrate improved reality functioning upon discharge  Outcome: Progressing  Goal: Verbalize thoughts and feelings  Description: Interventions:  - Promote  a nonjudgmental and trusting relationship with the patient through active listening and therapeutic communication  - Assess patient's level of functioning, behavior and potential for risk  - Engage patient in 1 on 1 interactions  - Encourage patient to express fears, feelings, frustrations, and discuss symptoms    - [place] patient to reality, help patient recognize reality-based thinking   - Administer medications as ordered and assess for potential side effects  - Provide the patient education related to the signs and symptoms of the illness and desired effects of prescribed medications  Outcome: Progressing  Goal: Refrain from acting on delusional thinking/internal stimuli  Description: Interventions:  - Monitor patient closely, per order   - Utilize least restrictive measures   - Set reasonable limits, give positive feedback for acceptable   - Administer medications as ordered and monitor of potential side effects  Outcome: Progressing  Goal: Agree to be compliant with medication regime, as prescribed and report medication side effects  Description: Interventions:  - Offer appropriate PRN medication and supervise ingestion; conduct AIMS, as needed   Outcome: Progressing  Goal: Attend and participate in unit activities, including therapeutic, recreational, and educational groups  Description: Interventions:  -Encourage Visitation and family involvement in care  Outcome: Progressing  Goal: Recognize dysfunctional thoughts, communicate reality-based thoughts at the time of discharge  Description: Interventions:  - Provide medication and psycho-education to assist patient in compliance and developing insight into his/her illness   Outcome: Progressing  Goal: Complete daily ADLs, including personal hygiene independently, as able  Description: Interventions:  - Observe, teach, and assist patient with ADLS  - Monitor and promote a balance of rest/activity, with adequate nutrition and elimination   Outcome: Progressing      Problem: Ineffective Coping  Goal: Cooperates with admission process  Description: Interventions:   - Complete admission process  Outcome: Progressing  Goal: Identifies ineffective coping skills  Outcome: Progressing  Goal: Identifies healthy coping skills  Outcome: Progressing  Goal: Demonstrates healthy coping skills  Outcome: Progressing  Goal: Participates in unit activities  Description: Interventions:  - Provide therapeutic environment   - Provide required programming   - Redirect inappropriate behaviors   Outcome: Progressing  Goal: Patient/Family participate in treatment and DC plans  Description: Interventions:  - Provide therapeutic environment  Outcome: Progressing  Goal: Patient/Family verbalizes awareness of resources  Outcome: Progressing  Goal: Understands least restrictive measures  Description: Interventions:  - Utilize least restrictive behavior  Outcome: Progressing  Goal: Free from restraint events  Description: - Utilize least restrictive measures   - Provide behavioral interventions   - Redirect inappropriate behaviors   Outcome: Progressing     Problem: Anxiety  Goal: Anxiety is at manageable level  Description: Interventions:  - Assess and monitor patient's anxiety level.   - Monitor for signs and symptoms (heart palpitations, chest pain, shortness of breath, headaches, nausea, feeling jumpy, restlessness, irritable, apprehensive).   - Collaborate with interdisciplinary team and initiate plan and interventions as ordered.  - [place] patient to unit/surroundings  - Explain treatment plan  - Encourage participation in care  - Encourage verbalization of concerns/fears  - Identify coping mechanisms  - Assist in developing anxiety-reducing skills  - Administer/offer alternative therapies  - Limit or eliminate stimulants  Outcome: Progressing

## 2024-07-23 NOTE — ASSESSMENT & PLAN NOTE
Patient will continue on Coreg 6.25 mg BID and Catapres TTS patch  We will continue to monitor her blood pressure and titrate medications as needed  Once she has consistent oral intake, we can likely discontinue the Catapres patch

## 2024-07-23 NOTE — TREATMENT TEAM
07/23/24 1952   Provider Notification   Reason for Communication Evaluate   Provider Name Darin Lawton   Provider Role Hospitalist   Method of Communication Other (Comment)  (SecureChat)   Response See orders   Notification Time 1946     Patient's respiratory rate becoming more labored. 88% on 3L NC. Increased to 4L and SpO2 increased to 93%. Expiratory wheezing heard and patient c/o nausea. Provider made aware. See new orders.

## 2024-07-23 NOTE — ASSESSMENT & PLAN NOTE
Lab Results   Component Value Date    HGBA1C 6.4 (H) 05/03/2024     Recent Labs     07/22/24  1109 07/22/24  1540 07/22/24 2037 07/23/24  0531   POCGLU 109 88 87 112       Prior to admission on metformin.  Placed on sliding scale insulin during medical hospitalization    I would keep her OFF of metformin as she is diet controlled

## 2024-07-23 NOTE — NURSING NOTE
"[facility] evaluation and medical admission. She has a 304 commitment status. Patient is oriented to person only. She is has a disorganized thought process. Patient displays poor safety awareness, impaired judgement, and unsteady gait. Started on 1:1 observation continuously for safety. She is incontinent of urine at times. When asked if patient was experiencing any psych symptoms she stated \"I can't tell you that\" and asked to shave, although appears to be RIS. Encouraged to inform staff of any needs or concerns.     "

## 2024-07-23 NOTE — CASE MANAGEMENT
Case Management Progress Note    Patient name Meli Nowak  Location 7T U 702/7T U 702-01 MRN [id number]  : 1965 Date 2024       LOS (days): 10  Geometric Mean LOS (GMLOS) (days): 2.6  Days to GMLOS:-7.3        OBJECTIVE:        Current admission status: Inpatient  Preferred Pharmacy:   UNKNOWN - FOLLOW UP PRIOR TO DISCHARGE TO E-PRESCRIBE  No address on file      Primary Care Provider: Adriana Bradford MD    Primary Insurance: MEDICARE  Secondary Insurance:     PROGRESS NOTE:    Medically cleared for BHU per JAMES Diaz.  Updated dtr Chari and ICM Conchita vis phone same.

## 2024-07-23 NOTE — TREATMENT TEAM
07/23/24 1743   Provider Notification   Reason for Communication Evaluate   Provider Name JOSE ELIAS Puri   Provider Role Hospitalist   Method of Communication Other (Comment)  (Secure Chat)   Response Waiting for response   Notification Time 1730     Patient noticed to be diaphoretic with increased work of breathing. SpO2 81% on RA, 24 rpm. Patient started on 3L O2 NC but requires soft wrist restraints to not interfere with oxygen delivery. SpO2 90% on 3L. IM Ativan given in right deltoid for refusal of evening PO medications per order. Provider made aware and awaiting orders.

## 2024-07-23 NOTE — TREATMENT TEAM
"   07/23/24 1819   Provider Notification   Reason for Communication Evaluate   Provider Name Parris Bolanos   Provider Role Hospitalist   Method of Communication Other (Comment)  (Secure Chat)   Response No new orders   Notification Time 1817     Patient c/o chest pain. Stated \"Heart feels mush. Pain sometimes.\" EKG performed and NSR. Patient becoming calmer with IM Ativan. VS improving, 93% O2 with 3L NC. Provider made aware. No further interventions.   "

## 2024-07-23 NOTE — CONSULTS
[facility]  Consult  Name: Meli Nowak 59 y.o. female I MRN: [id number]  Unit/Bed#: [facility] 651-01 I Date of Admission: 7/23/2024   Date of Service: 7/24/2024 I Hospital Day: 1    Inpatient consult for Medical Clearance for  patient  Consult performed by: JOSE ELIAS Puri  Consult ordered by: JOSE ELIAS Figueroa          Assessment & Plan   Medical clearance for psychiatric admission  Assessment & Plan  Admission labs: CBC, CMP, lipid panel, TSH acceptable  Follow-up repeat Folate, B12, Vitamin D 25 hydroxy labs   Vitals stable   UA unremarkable   EKG reveals NSR, 86 bpm   Patient is medically cleared for admission to [facility] and treatment of underlying psychiatric illness based on available results  Please contact [facility]IM with any questions or concerns    Hypoxia  Assessment & Plan  SpO2 81% on room air, likely obesity hypoventilation syndrome and position as she is often flat in bed  ECHO shows G1DD  Encourage deep breathing   Sit upright   Follow-up CXR   Consider starting HCTZ if oral intake remains consist     Essential (primary) hypertension  Assessment & Plan  Patient will continue on Coreg 6.25 mg BID and Catapres TTS patch  We will continue to monitor her blood pressure and titrate medications as needed  Once she has consistent oral intake, we can likely discontinue the Catapres patch     Type 2 diabetes mellitus (HCC)  Assessment & Plan  Lab Results   Component Value Date    HGBA1C 6.4 (H) 05/03/2024       Recent Labs     07/23/24  0531 07/23/24  1023 07/23/24  1733 07/23/24  2142   POCGLU 112 128 115 101       Blood Sugar Average: Last 72 hrs:  (P) 108  Monitor ACCU checks AC + HS with lispro insulin sliding scale  Encourage oral intake    Vitamin B12 deficiency  Assessment & Plan  Continue B12 supplementation and follow-up repeat level     Esophageal reflux  Assessment & Plan  Continue on Pepcid    Schizoaffective disorder, bipolar type (HCC)  Assessment &  Plan  [facility]  Management per primary service     Hyperlipidemia  Assessment & Plan  Continue on Lipitor    Tobacco abuse  Assessment & Plan  Prior to her inpatient admission patient was an active long-term tobacco abuser  Patient has a nicotine patch at 7 mg, continue  Smoking cessation education    Obesity  Assessment & Plan  Would benefit from weight loss and therapeutic lifestyle changes  Education and counseling as tolerated   Consider nutrition evaluation            ECT Clearance:  History of recent seizure or stroke:  no  History of pheochromocytoma:  no  History of active bleeding (Intracranial hemorrhage, aneurysm or AVM):  no  History of metallic implants in the head or neck:  no  History of increased intracranial pressure with mass effect:  no    EKG within 3 months?  If yes, was an arrhythmia present and at baseline? no  If yes, what is the baseline QT interval? 447    Based on above criteria, Patient is  medically cleared for ECT should it be recommended.    Counseling / Coordination of Care Time: 45 minutes.  Greater than 50% of total time spent on patient counseling and coordination of care.    Collaboration of Care: Were Recommendations Directly Discussed with Primary Treatment Team? - Yes     History of Present Illness:    Meli Nowak is a 59 y.o. female who is originally admitted to the psychiatry service due to schizoaffective disorder. We are consulted for medical clearance for admission to Behavioral Health Unit and treatment of underlying psychiatric illness.      Patient was originally admitted to the U on 7/2/2024 she was then transferred to medical unit on 7/3/2024 for a questionable  NMS. She returned to U on 7/11/2024 then was transferred back to medical on 7/13/2024 due to catatonia and not eating.  Patient returned to U on 7/23/2024.    Patient has a past medical history including schizoaffective disorder, bipolar type, hypertension, hyperlipidemia, insulin-dependent  "diabetes mellitus, reactive airway disease, GERD, tobacco abuse, vitamin B12 deficiency.  She was originally admitted to the psychiatric service due to catatonia and for medical management and psychiatric titration of medications.  She is now on a stable dose of Clozaril, and her mental status is better. Patient is eating with the assistance and appears brighter after  psychiatric medication changes.  She is now talking a little bit, answering questions some questions, and eating with help.  She was able to say she wanted vanilla or chocolate Ensure but not willing to answer much else.     Review of Systems:    Review of Systems   Unable to perform ROS: Psychiatric disorder       Past Medical and Surgical History:     Past Medical History:   Diagnosis Date    Cognitive impairment     Diabetes mellitus (HCC)     Essential (primary) hypertension     Psychosis (HCC)        Past Surgical History:   Procedure Laterality Date     SECTION      CHOLECYSTECTOMY         Meds/Allergies:    all medications and allergies reviewed    Allergies: No Known Allergies    Social History:     Marital Status: Single    Substance Use History:   Social History     Substance and Sexual Activity   Alcohol Use Not Currently    Comment: Unable to determine     Social History     Tobacco Use   Smoking Status Every Day    Current packs/day: 0.50    Types: Cigarettes   Smokeless Tobacco Never     Social History     Substance and Sexual Activity   Drug Use No       Family History:    non-contributory    Physical Exam:     Vitals:   Blood Pressure: 129/58 (24 08)  Pulse: 82 (24)  Temperature: 97.9 °F (36.6 °C) (24)  Temp Source: Temporal (24)  Respirations: 18 (24)  Height: 5' 7\" (170.2 cm) (24 1330)  Weight - Scale: 106 kg (234 lb 8 oz) (24 1330)  SpO2: 96 % (24 08)    Physical Exam  Vitals and nursing note reviewed.   Constitutional:       General: She is not in acute " distress.     Appearance: She is well-developed. She is obese. She is ill-appearing (deconditioned). She is not diaphoretic.   HENT:      Head: Normocephalic.   Cardiovascular:      Rate and Rhythm: Normal rate and regular rhythm.   Pulmonary:      Effort: Pulmonary effort is normal. No tachypnea or respiratory distress.      Breath sounds: Examination of the right-lower field reveals decreased breath sounds. Examination of the left-lower field reveals decreased breath sounds. Decreased breath sounds present. No wheezing.   Abdominal:      General: Bowel sounds are normal. There is no distension.      Palpations: Abdomen is soft.      Tenderness: There is no abdominal tenderness.   Musculoskeletal:         General: No edema. Normal range of motion.      Cervical back: Normal range of motion.      Right lower leg: No edema.      Left lower leg: No edema.   Skin:     General: Skin is warm and dry.      Capillary Refill: Capillary refill takes less than 2 seconds.   Neurological:      Mental Status: She is alert. She is disoriented.      Deep Tendon Reflexes: Reflexes are normal and symmetric.   Psychiatric:         Mood and Affect: Affect is flat and inappropriate.         Speech: Speech is delayed.         Behavior: Behavior is cooperative.         Judgment: Judgment is inappropriate.         Additional Data:     Lab Results: I have personally reviewed pertinent reports.      Results from last 7 days   Lab Units 07/24/24  0615   WBC Thousand/uL 7.18   HEMOGLOBIN g/dL 12.7   HEMATOCRIT % 41.5   PLATELETS Thousands/uL 186   SEGS PCT % 65   LYMPHO PCT % 21   MONO PCT % 12   EOS PCT % 0     Results from last 7 days   Lab Units 07/24/24  0615   SODIUM mmol/L 143   POTASSIUM mmol/L 3.6   CHLORIDE mmol/L 104   CO2 mmol/L 30   BUN mg/dL 9   CREATININE mg/dL 0.86   ANION GAP mmol/L 9   CALCIUM mg/dL 8.6   ALBUMIN g/dL 3.2*   TOTAL BILIRUBIN mg/dL 0.63   ALK PHOS U/L 77   ALT U/L 28   AST U/L 26   GLUCOSE RANDOM mg/dL 105              Lab Results   Component Value Date/Time    HGBA1C 6.4 (H) 05/03/2024 02:07 PM    HGBA1C 6.3 (H) 11/16/2023 02:37 PM    HGBA1C 6.8 (H) 10/06/2023 03:01 PM     Results from last 7 days   Lab Units 07/24/24  0747 07/23/24  2142 07/23/24  1733 07/23/24  1023 07/23/24  0531 07/22/24  2037 07/22/24  1540 07/22/24  1109 07/22/24  0540 07/21/24  2035 07/21/24  1702 07/21/24  1053   POC GLUCOSE mg/dl 106 101 115 128 112 87 88 109 78 94 82 107       EKG, Pathology, and Other Studies Reviewed on Admission:   EKG 7/13/2024 twelve-lead EKG reviewed reviewed by myself as well as interpreted by myself ventricular rate 114 QT interval 326 QTc 449 sinus tachycardia when compared with an EKG from the previous day 7/12/2024 there are no acute EKG changes in this finding.  7/24/2024 pending EKG    ** Please Note: This note has been constructed using a voice recognition system. **    I have spent a total time of 45 minutes on 07/24/24 in caring for this patient including Diagnostic results, Prognosis, Risks and benefits of tx options, Instructions for management, Patient and family education, Importance of tx compliance, Risk factor reductions, Impressions, Counseling / Coordination of care, Documenting in the medical record, Reviewing / ordering tests, medicine, procedures  , Obtaining or reviewing history  , and Communicating with other healthcare professionals .

## 2024-07-23 NOTE — ASSESSMENT & PLAN NOTE
Admission labs: CBC, CMP, lipid panel, TSH acceptable  Follow-up repeat Folate, B12, Vitamin D 25 hydroxy labs   Vitals stable   UA unremarkable   EKG reveals NSR, 86 bpm   Patient is medically cleared for admission to [facility] and treatment of underlying psychiatric illness based on available results  Please contact [facility]IM with any questions or concerns

## 2024-07-23 NOTE — DISCHARGE SUMMARY
[facility]  Discharge- Meli Nowak 1965, 59 y.o. female MRN: [id number]  Unit/Bed#: 7T [facility] 702-01 Encounter: [id number]  Primary Care Provider: Adriana Bradford MD   Date and time admitted to hospital: 7/13/2024  1:14 PM    * Schizoaffective disorder, bipolar type (HCC)  Assessment & Plan  History of reactive airway disease diabetes hypertension and mood disorder transferred from [facility] for medical optimization.  7/2/2024: Admitted to [facility].  7/3/2024: Transferred to medical service for concerns of NMS.  Transferred to [facility] for formal neurology evaluation.  MRI negative.  LP without evidence of infection.  Paraneoplastic panel negative.  7/11/2024: Transferred back to [facility] but has had no oral intake and refusing to take medications.  Reviewed outpatient records.  Previously on buspirone 10 mg twice daily, alprazolam XR 2 mg in the morning and 1 mg at bedtime, clozapine 200 mg in the morning and 300 mg at bedtime, paroxetine 60 mg daily    She continues to improve with psychiatric medication changes  She is now talking.   Answering questions, eating her meals with help    She is medically cleared to go to the psychiatric unit.    Type 2 diabetes mellitus (HCC)  Assessment & Plan  Lab Results   Component Value Date    HGBA1C 6.4 (H) 05/03/2024     Recent Labs     07/22/24  1109 07/22/24  1540 07/22/24  2037 07/23/24  0531   POCGLU 109 88 87 112       Prior to admission on metformin.  Placed on sliding scale insulin during medical hospitalization    I would keep her OFF of metformin as she is diet controlled        Discharging Physician / Practitioner: Abran Gallegos DO  PCP: Adriana Bradford MD  Admission Date:   Admission Orders (From admission, onward)       Ordered        07/13/24 1321  INPATIENT ADMISSION  Once                          Discharge Date: 07/23/24    Medical Problems       Resolved Problems  Date Reviewed: 7/14/2024            Resolved     "Hypokalemia 7/18/2024     Resolved by  Cristiano Cruz DO            Consultations During Hospital Stay:  psychiatry      Reason for Admission: psychosis.      Hospital Course:     Meli Nowak is a 59 y.o. female patient who originally presented to the hospital on 7/13/2024 due to psychosis.  She was found by police confused on a park bench.  She was felt to be catatonic.   She has a history of bipolar disorder.   She was admitted to the medical floor with psychiatric consultation.   She has done very well with adjustment of psychiatric medication changes.   She is now talking, answering questions, and eating with help.  She is not yet at baseline mental status but much improved.   No further medical issues.  She is medically cleared to go to the inpatient psychiatric unit and will go there this afternoon.    Please see above list of diagnoses and related plan for additional information.       Condition at Discharge: stable       Discharge Day Visit / Exam:     Subjective:  \"where am I\"  She is eating  Has no complaints.      Vitals: Blood Pressure: 152/87 (07/23/24 0656)  Pulse: 76 (07/23/24 0656)  Temperature: 97.9 °F (36.6 °C) (07/23/24 0656)  Temp Source: Temporal (07/23/24 0656)  Respirations: 18 (07/23/24 0656)  Height: 5' 7\" (170.2 cm) (07/13/24 1322)  Weight - Scale: 109 kg (240 lb 8.4 oz) (07/13/24 1322)  SpO2: 92 % (07/23/24 0656)    Exam:     Physical Exam  Vitals and nursing note reviewed.   HENT:      Head: Normocephalic and atraumatic.   Eyes:      Pupils: Pupils are equal, round, and reactive to light.   Cardiovascular:      Rate and Rhythm: Normal rate and regular rhythm.      Heart sounds: No murmur heard.     No friction rub. No gallop.   Pulmonary:      Effort: Pulmonary effort is normal.      Breath sounds: Normal breath sounds. No wheezing or rales.   Abdominal:      General: Bowel sounds are normal.      Palpations: Abdomen is soft.      Tenderness: There is no abdominal tenderness. "   Musculoskeletal:      Right lower leg: No edema.      Left lower leg: No edema.       .         Discharge instructions/Information to patient and family:   See after visit summary for information provided to patient and family.      Provisions for Follow-Up Care:  See after visit summary for information related to follow-up care and any pertinent home health orders.      Disposition:     Inpatient Psychiatry at [place]       Discharge Statement:  I spent 36 minutes discharging the patient. This time was spent on the day of discharge. I had direct contact with the patient on the day of discharge. Greater than 50% of the total time was spent examining patient, answering all patient questions, arranging and discussing plan of care with patient as well as directly providing post-discharge instructions.  Additional time then spent on discharge activities.    Discharge Medications:  See after visit summary for reconciled discharge medications provided to patient and family.      ** Please Note: This note has been constructed using a voice recognition system **

## 2024-07-23 NOTE — PLAN OF CARE
Problem: PAIN - ADULT  Goal: Verbalizes/displays adequate comfort level or baseline comfort level  Description: Interventions:  - Encourage patient to monitor pain and request assistance  - Assess pain using appropriate pain scale  - Administer analgesics based on type and severity of pain and evaluate response  - Implement non-pharmacological measures as appropriate and evaluate response  - Consider cultural and social influences on pain and pain management  - Notify physician/advanced practitioner if interventions unsuccessful or patient reports new pain  Outcome: Progressing     Problem: INFECTION - ADULT  Goal: Absence or prevention of progression during hospitalization  Description: INTERVENTIONS:  - Assess and monitor for signs and symptoms of infection  - Monitor lab/diagnostic results  - Monitor all insertion sites, i.e. indwelling lines, tubes, and drains  - Monitor endotracheal if appropriate and nasal secretions for changes in amount and color  - [place] appropriate cooling/warming therapies per order  - Administer medications as ordered  - Instruct and encourage patient and family to use good hand hygiene technique  - Identify and instruct in appropriate isolation precautions for identified infection/condition  Outcome: Progressing     Problem: Nutrition/Hydration-ADULT  Goal: Nutrient/Hydration intake appropriate for improving, restoring or maintaining nutritional needs  Description: Monitor and assess patient's nutrition/hydration status for malnutrition. Collaborate with interdisciplinary team and initiate plan and interventions as ordered.  Monitor patient's weight and dietary intake as ordered or per policy. Utilize nutrition screening tool and intervene as necessary. Determine patient's food preferences and provide high-protein, high-caloric foods as appropriate.     INTERVENTIONS:  - Monitor oral intake, urinary output, labs, and treatment plans  - Assess nutrition and hydration status and  recommend course of action  - Evaluate amount of meals eaten  - Assist patient with eating if necessary   - Allow adequate time for meals  - Recommend/ encourage appropriate diets, oral nutritional supplements, and vitamin/mineral supplements  - Order, calculate, and assess calorie counts as needed  - Recommend, monitor, and adjust tube feedings and TPN/PPN based on assessed needs  - Assess need for intravenous fluids  - Provide specific nutrition/hydration education as appropriate  - Include patient/family/caregiver in decisions related to nutrition  Outcome: Progressing     Problem: METABOLIC, FLUID AND ELECTROLYTES - ADULT  Goal: Electrolytes maintained within normal limits  Description: INTERVENTIONS:  - Monitor labs and assess patient for signs and symptoms of electrolyte imbalances  - Administer electrolyte replacement as ordered  - Monitor response to electrolyte replacements, including repeat lab results as appropriate  - Instruct patient on fluid and nutrition as appropriate  Outcome: Progressing     Problem: Knowledge Deficit  Goal: Patient/family/caregiver demonstrates understanding of disease process, treatment plan, medications, and discharge instructions  Description: Complete learning assessment and assess knowledge base.  Interventions:  - Provide teaching at level of understanding  - Provide teaching via preferred learning methods  Outcome: Progressing     Problem: DISCHARGE PLANNING  Goal: Discharge to home or other facility with appropriate resources  Description: INTERVENTIONS:  - Identify barriers to discharge w/patient and caregiver  - Arrange for needed discharge resources and transportation as appropriate  - Identify discharge learning needs (meds, wound care, etc.)  - Arrange for interpretive services to assist at discharge as needed  - Refer to Case Management Department for coordinating discharge planning if the patient needs post-hospital services based on physician/advanced practitioner  order or complex needs related to functional status, cognitive ability, or social support system  Outcome: Progressing

## 2024-07-23 NOTE — ASSESSMENT & PLAN NOTE
History of reactive airway disease diabetes hypertension and mood disorder transferred from [facility] for medical optimization.  7/2/2024: Admitted to [facility].  7/3/2024: Transferred to medical service for concerns of NMS.  Transferred to [facility] for formal neurology evaluation.  MRI negative.  LP without evidence of infection.  Paraneoplastic panel negative.  7/11/2024: Transferred back to [facility] but has had no oral intake and refusing to take medications.  Reviewed outpatient records.  Previously on buspirone 10 mg twice daily, alprazolam XR 2 mg in the morning and 1 mg at bedtime, clozapine 200 mg in the morning and 300 mg at bedtime, paroxetine 60 mg daily    She continues to improve with psychiatric medication changes  She is now talking.   Answering questions, eating her meals with help    She is medically cleared to go to the psychiatric unit.

## 2024-07-23 NOTE — PLAN OF CARE
Problem: PAIN - ADULT  Goal: Verbalizes/displays adequate comfort level or baseline comfort level  Description: Interventions:  - Encourage patient to monitor pain and request assistance  - Assess pain using appropriate pain scale  - Administer analgesics based on type and severity of pain and evaluate response  - Implement non-pharmacological measures as appropriate and evaluate response  - Consider cultural and social influences on pain and pain management  - Notify physician/advanced practitioner if interventions unsuccessful or patient reports new pain  Outcome: Progressing     Problem: INFECTION - ADULT  Goal: Absence or prevention of progression during hospitalization  Description: INTERVENTIONS:  - Assess and monitor for signs and symptoms of infection  - Monitor lab/diagnostic results  - Monitor all insertion sites, i.e. indwelling lines, tubes, and drains  - Monitor endotracheal if appropriate and nasal secretions for changes in amount and color  - [place] appropriate cooling/warming therapies per order  - Administer medications as ordered  - Instruct and encourage patient and family to use good hand hygiene technique  - Identify and instruct in appropriate isolation precautions for identified infection/condition  Outcome: Progressing     Problem: Knowledge Deficit  Goal: Patient/family/caregiver demonstrates understanding of disease process, treatment plan, medications, and discharge instructions  Description: Complete learning assessment and assess knowledge base.  Interventions:  - Provide teaching at level of understanding  - Provide teaching via preferred learning methods  Outcome: Progressing     Problem: DISCHARGE PLANNING  Goal: Discharge to home or other facility with appropriate resources  Description: INTERVENTIONS:  - Identify barriers to discharge w/patient and caregiver  - Arrange for needed discharge resources and transportation as appropriate  - Identify discharge learning needs (meds,  wound care, etc.)  - Arrange for interpretive services to assist at discharge as needed  - Refer to Case Management Department for coordinating discharge planning if the patient needs post-hospital services based on physician/advanced practitioner order or complex needs related to functional status, cognitive ability, or social support system  Outcome: Progressing

## 2024-07-24 ENCOUNTER — APPOINTMENT (INPATIENT)
Dept: RADIOLOGY | Facility: HOSPITAL | Age: 59
DRG: 885 | End: 2024-07-24
Payer: MEDICARE

## 2024-07-24 PROBLEM — E87.0 HYPERNATREMIA: Status: RESOLVED | Noted: 2024-07-05 | Resolved: 2024-07-24

## 2024-07-24 PROBLEM — N17.9 AKI (ACUTE KIDNEY INJURY) (HCC): Status: RESOLVED | Noted: 2024-07-03 | Resolved: 2024-07-24

## 2024-07-24 PROBLEM — R14.0 ABDOMINAL DISTENSION: Status: RESOLVED | Noted: 2024-07-03 | Resolved: 2024-07-24

## 2024-07-24 PROBLEM — M62.82 RHABDOMYOLYSIS: Status: RESOLVED | Noted: 2024-07-03 | Resolved: 2024-07-24

## 2024-07-24 PROBLEM — J45.909 REACTIVE AIRWAY DISEASE: Status: RESOLVED | Noted: 2024-07-03 | Resolved: 2024-07-24

## 2024-07-24 PROBLEM — R65.10 SIRS (SYSTEMIC INFLAMMATORY RESPONSE SYNDROME) (HCC): Status: RESOLVED | Noted: 2024-07-03 | Resolved: 2024-07-24

## 2024-07-24 PROBLEM — R82.90 ABNORMAL URINALYSIS: Status: RESOLVED | Noted: 2024-07-03 | Resolved: 2024-07-24

## 2024-07-24 PROBLEM — R09.02 HYPOXIA: Status: ACTIVE | Noted: 2024-07-24

## 2024-07-24 LAB
25(OH)D3 SERPL-MCNC: 43.6 NG/ML (ref 30–100)
ALBUMIN SERPL BCG-MCNC: 3.2 G/DL (ref 3.5–5)
ALP SERPL-CCNC: 77 U/L (ref 34–104)
ALT SERPL W P-5'-P-CCNC: 28 U/L (ref 7–52)
ANION GAP SERPL CALCULATED.3IONS-SCNC: 9 MMOL/L (ref 4–13)
AST SERPL W P-5'-P-CCNC: 26 U/L (ref 13–39)
BASOPHILS # BLD AUTO: 0.05 THOUSANDS/ΜL (ref 0–0.1)
BASOPHILS NFR BLD AUTO: 1 % (ref 0–1)
BILIRUB SERPL-MCNC: 0.63 MG/DL (ref 0.2–1)
BUN SERPL-MCNC: 9 MG/DL (ref 5–25)
CALCIUM ALBUM COR SERPL-MCNC: 9.2 MG/DL (ref 8.3–10.1)
CALCIUM SERPL-MCNC: 8.6 MG/DL (ref 8.4–10.2)
CHLORIDE SERPL-SCNC: 104 MMOL/L (ref 96–108)
CO2 SERPL-SCNC: 30 MMOL/L (ref 21–32)
CREAT SERPL-MCNC: 0.86 MG/DL (ref 0.6–1.3)
EOSINOPHIL # BLD AUTO: 0 THOUSAND/ΜL (ref 0–0.61)
EOSINOPHIL NFR BLD AUTO: 0 % (ref 0–6)
ERYTHROCYTE [DISTWIDTH] IN BLOOD BY AUTOMATED COUNT: 16.1 % (ref 11.6–15.1)
FOLATE SERPL-MCNC: 21 NG/ML
GFR SERPL CREATININE-BSD FRML MDRD: 75 ML/MIN/1.73SQ M
GLUCOSE P FAST SERPL-MCNC: 105 MG/DL (ref 65–99)
GLUCOSE SERPL-MCNC: 102 MG/DL (ref 65–140)
GLUCOSE SERPL-MCNC: 102 MG/DL (ref 65–140)
GLUCOSE SERPL-MCNC: 105 MG/DL (ref 65–140)
GLUCOSE SERPL-MCNC: 106 MG/DL (ref 65–140)
GLUCOSE SERPL-MCNC: 133 MG/DL (ref 65–140)
HCT VFR BLD AUTO: 41.5 % (ref 34.8–46.1)
HGB BLD-MCNC: 12.7 G/DL (ref 11.5–15.4)
IMM GRANULOCYTES # BLD AUTO: 0.04 THOUSAND/UL (ref 0–0.2)
IMM GRANULOCYTES NFR BLD AUTO: 1 % (ref 0–2)
LYMPHOCYTES # BLD AUTO: 1.47 THOUSANDS/ΜL (ref 0.6–4.47)
LYMPHOCYTES NFR BLD AUTO: 21 % (ref 14–44)
MAGNESIUM SERPL-MCNC: 2.3 MG/DL (ref 1.9–2.7)
MCH RBC QN AUTO: 29.5 PG (ref 26.8–34.3)
MCHC RBC AUTO-ENTMCNC: 30.6 G/DL (ref 31.4–37.4)
MCV RBC AUTO: 97 FL (ref 82–98)
MONOCYTES # BLD AUTO: 0.84 THOUSAND/ΜL (ref 0.17–1.22)
MONOCYTES NFR BLD AUTO: 12 % (ref 4–12)
NEUTROPHILS # BLD AUTO: 4.78 THOUSANDS/ΜL (ref 1.85–7.62)
NEUTS SEG NFR BLD AUTO: 65 % (ref 43–75)
NRBC BLD AUTO-RTO: 0 /100 WBCS
PHOSPHATE SERPL-MCNC: 5.3 MG/DL (ref 2.7–4.5)
PLATELET # BLD AUTO: 186 THOUSANDS/UL (ref 149–390)
PMV BLD AUTO: 10.2 FL (ref 8.9–12.7)
POTASSIUM SERPL-SCNC: 3.6 MMOL/L (ref 3.5–5.3)
PROT SERPL-MCNC: 5.8 G/DL (ref 6.4–8.4)
RBC # BLD AUTO: 4.3 MILLION/UL (ref 3.81–5.12)
SODIUM SERPL-SCNC: 143 MMOL/L (ref 135–147)
TSH SERPL DL<=0.05 MIU/L-ACNC: 2 UIU/ML (ref 0.45–4.5)
VIT B12 SERPL-MCNC: 526 PG/ML (ref 180–914)
WBC # BLD AUTO: 7.18 THOUSAND/UL (ref 4.31–10.16)

## 2024-07-24 PROCEDURE — 84100 ASSAY OF PHOSPHORUS: CPT

## 2024-07-24 PROCEDURE — 84443 ASSAY THYROID STIM HORMONE: CPT

## 2024-07-24 PROCEDURE — 80053 COMPREHEN METABOLIC PANEL: CPT

## 2024-07-24 PROCEDURE — 82746 ASSAY OF FOLIC ACID SERUM: CPT

## 2024-07-24 PROCEDURE — 82948 REAGENT STRIP/BLOOD GLUCOSE: CPT

## 2024-07-24 PROCEDURE — 99223 1ST HOSP IP/OBS HIGH 75: CPT | Performed by: STUDENT IN AN ORGANIZED HEALTH CARE EDUCATION/TRAINING PROGRAM

## 2024-07-24 PROCEDURE — 83735 ASSAY OF MAGNESIUM: CPT

## 2024-07-24 PROCEDURE — 71045 X-RAY EXAM CHEST 1 VIEW: CPT

## 2024-07-24 PROCEDURE — 85025 COMPLETE CBC W/AUTO DIFF WBC: CPT

## 2024-07-24 PROCEDURE — 99222 1ST HOSP IP/OBS MODERATE 55: CPT | Performed by: NURSE PRACTITIONER

## 2024-07-24 PROCEDURE — 82607 VITAMIN B-12: CPT

## 2024-07-24 PROCEDURE — 82306 VITAMIN D 25 HYDROXY: CPT

## 2024-07-24 RX ORDER — POLYETHYLENE GLYCOL 3350 17 G/17G
17 POWDER, FOR SOLUTION ORAL DAILY
Status: DISCONTINUED | OUTPATIENT
Start: 2024-07-24 | End: 2024-12-30 | Stop reason: HOSPADM

## 2024-07-24 RX ORDER — POTASSIUM CHLORIDE 20MEQ/15ML
20 LIQUID (ML) ORAL ONCE
Status: COMPLETED | OUTPATIENT
Start: 2024-07-24 | End: 2024-07-24

## 2024-07-24 RX ORDER — POTASSIUM CHLORIDE 20MEQ/15ML
20 LIQUID (ML) ORAL ONCE
Status: DISCONTINUED | OUTPATIENT
Start: 2024-07-24 | End: 2024-07-26

## 2024-07-24 RX ADMIN — LORAZEPAM 1 MG: 1 TABLET ORAL at 17:13

## 2024-07-24 RX ADMIN — POLYETHYLENE GLYCOL 3350 17 G: 17 POWDER, FOR SOLUTION ORAL at 08:33

## 2024-07-24 RX ADMIN — FLUTICASONE FUROATE 1 PUFF: 100 POWDER RESPIRATORY (INHALATION) at 08:30

## 2024-07-24 RX ADMIN — FLUOXETINE 20 MG: 20 SOLUTION ORAL at 08:30

## 2024-07-24 RX ADMIN — ATORVASTATIN CALCIUM 10 MG: 10 TABLET, FILM COATED ORAL at 08:28

## 2024-07-24 RX ADMIN — POTASSIUM CHLORIDE 20 MEQ: 1.5 SOLUTION ORAL at 14:03

## 2024-07-24 RX ADMIN — LORAZEPAM 1 MG: 1 TABLET ORAL at 14:04

## 2024-07-24 RX ADMIN — Medication 15 ML: at 08:36

## 2024-07-24 RX ADMIN — LORAZEPAM 1 MG: 1 TABLET ORAL at 21:26

## 2024-07-24 RX ADMIN — FAMOTIDINE 20 MG: 40 POWDER, FOR SUSPENSION ORAL at 08:30

## 2024-07-24 RX ADMIN — CLOZAPINE 125 MG: 100 TABLET ORAL at 21:26

## 2024-07-24 RX ADMIN — CYANOCOBALAMIN TAB 1000 MCG 1000 MCG: 1000 TAB at 08:28

## 2024-07-24 RX ADMIN — NICOTINE 1 PATCH: 7 PATCH, EXTENDED RELEASE TRANSDERMAL at 08:28

## 2024-07-24 RX ADMIN — CARVEDILOL 6.25 MG: 6.25 TABLET, FILM COATED ORAL at 08:36

## 2024-07-24 RX ADMIN — MELATONIN TAB 3 MG 3 MG: 3 TAB at 21:25

## 2024-07-24 NOTE — TREATMENT PLAN
TREATMENT PLAN REVIEW - Behavioral Health Meli Nowak 59 y.o. 1965 female MRN: [id number]    [facility] 6B [facility] Room / Bed: [facility] 651/[facility] 651-01 Encounter: [id number]        Admit Date/Time:  7/23/2024  1:27 PM    Treatment Team:   MD Mercedes Carter LMSW Danielle Jones John Daniels Gillian Frey, RN Michael Micek, RN Samantha Diaz Jacqueline Almonte Karissa Marie Kormandy, CRNP    Diagnosis: Principal Problem:    Schizoaffective disorder, bipolar type (HCC)  Active Problems:    Medical clearance for psychiatric admission    Type 2 diabetes mellitus (HCC)    Obesity    Tobacco abuse    Hyperlipidemia    Esophageal reflux    Essential (primary) hypertension    Vitamin B12 deficiency    Hypoxia      Patient Strengths/Assets: Good past response to medications       Patient Barriers/Limitations: chronic mental illness, noncompliant with medication, noncompliant with treatment, poor insight, uncooperative     Short Term Goals: decrease in paranoid thoughts, decrease in psychotic symptoms, decrease in level of agitation, ability to stay safe on the unit, ability to stay free from restraints, improvement in ability to express basic needs, improvement in insight, improvement in reality testing, improvement in reasoning ability, improvement in impulse control, tolerate medications, acceptance of need for psychiatric treatment, acceptance of psychiatric medications    Long Term Goals: resolution of psychotic symptoms, improved reality testing, improved reasoning ability, improved impulse control, improved insight, no agitation on the unit, no aggressive behavior on the unit, able to express basic needs, acceptance of need for psychiatric medications, acceptance of need for psychiatric treatment, acceptance of need for psychiatric follow up after discharge, acceptance of psychiatric diagnosis, adequate sleep, adequate appetite, adequate oral intake    Progress  Towards Goals: restarting psychiatric medications as prescribed, still has psychotic symptoms    Recommended Treatment: medication management, patient medication education, group therapy, milieu therapy, continued Behavioral Health psychiatric evaluation/assessment process     Treatment Frequency: daily medication monitoring, group and milieu therapy daily, monitoring through interdisciplinary rounds, monitoring through weekly patient care conferences    Expected Discharge Date: 14 days - 8/7/2024    Discharge Plan: referral for outpatient medication management with a psychiatrist, referral for outpatient psychotherapy, return to previous living arrangement    Treatment Plan Created/Updated By: Shan Fitzgerald DO

## 2024-07-24 NOTE — PHYSICAL THERAPY NOTE
Physical Therapy Evaluation    Patient's Name: Meli Nowak    Admitting Diagnosis  Major depressive disorder, single episode, unspecified [F32.9]  Schizoaffective disorder (HCC) [F25.9]    Problem List  Patient Active Problem List   Diagnosis    Medical clearance for psychiatric admission    Type 2 diabetes mellitus (HCC)    Obesity    Psychosis (HCC)    Tobacco abuse    Hyperlipidemia    Schizoaffective disorder, bipolar type (HCC)    Altered mental status    Agoraphobia with panic disorder    Arthritis    Diastasis recti    Endometrial cancer (HCC)    Esophageal reflux    Hepatic steatosis    HSV-2 infection    Incisional hernia, without obstruction or gangrene    Incontinence of urine in female    Polycystic ovaries    Postmenopausal bleeding    Posttraumatic stress disorder    Right buttock pain    Catatonia    Essential (primary) hypertension    Vitamin B12 deficiency       Past Medical History  Past Medical History:   Diagnosis Date    Cognitive impairment     Diabetes mellitus (HCC)     Essential (primary) hypertension     Psychosis (HCC)        Past Surgical History  Past Surgical History:   Procedure Laterality Date     SECTION      CHOLECYSTECTOMY         Recent Imaging  No orders to display       Recent Vital Signs  Vitals:    24 1906 24 1910 247   BP: 148/65   131/74   BP Location: Left arm   Right arm   Pulse: 86   80   Resp: 21   18   Temp: 98.8 °F (37.1 °C)   97.5 °F (36.4 °C)   TempSrc: Temporal   Temporal   SpO2: (!) 88% 93% 94% 96%   Weight:       Height:            24 1610   PT Last Visit   PT Visit Date 24   Note Type   Note type Evaluation   Pain Assessment   Pain Assessment Tool 0-10   Pain Score No Pain   Restrictions/Precautions   Weight Bearing Precautions Per Order No   Other Precautions Chair Alarm;Cognitive;Bed Alarm;1:1;Impulsive;Fall Risk   Home Living   Type of Home Apartment   Home Layout One level;Stairs to enter with rails    Bathroom Shower/Tub Tub/shower unit   Bathroom Toilet Standard   Prior Function   Level of [place] Independent with ADLs;Independent with IADLS   Lives With Alone   General   Family/Caregiver Present No   Cognition   Overall Cognitive Status Impaired   Arousal/Participation Uncooperative   Attention Attends with cues to redirect   Orientation Level Oriented to person   Memory Decreased recall of recent events;Decreased recall of precautions   Following Commands Unable to follow one step commands   RLE Assessment   RLE Assessment   (4/5)   LLE Assessment   LLE Assessment   (4/5)   Coordination   Movements are Fluid and Coordinated 0   Coordination and Movement Description scissoring gait, decreased standing balance, decreased coordination and motor planning   Bed Mobility   Supine to Sit 4  Minimal assistance   Additional items Assist x 1;Bedrails;Increased time required;Verbal cues;LE management   Sit to Supine 4  Minimal assistance   Additional items Assist x 1;Bedrails;Increased time required;Verbal cues;LE management   Transfers   Sit to Stand 4  Minimal assistance   Additional items Assist x 1;Armrests;Increased time required;Verbal cues   Stand to Sit 4  Minimal assistance   Additional items Assist x 1;Armrests;Increased time required;Verbal cues   Ambulation/Elevation   Gait pattern Step through pattern;Decreased toe off;Decreased heel strike;Excessively slow;Short stride;Decreased foot clearance;Scissoring;Narrow MARCIAL   Gait Assistance 3  Moderate assist   Additional items Assist x 1;Verbal cues;Tactile cues   Assistive Device None   Distance 40ft   Balance   Static Sitting Fair -   Dynamic Sitting Fair -   Static Standing Poor +   Dynamic Standing Poor   Ambulatory Poor   Endurance Deficit   Endurance Deficit Yes   Endurance Deficit Description reduced from baseline   Activity Tolerance   Activity Tolerance Patient tolerated treatment well   Medical Staff Made Aware spoke to CM   Nurse Made Aware spoke  to RN   Assessment   Prognosis Fair   Problem List Decreased strength;Decreased range of motion;Decreased endurance;Impaired balance;Decreased mobility;Decreased coordination;Decreased cognition;Impaired judgement;Decreased safety awareness;Impaired sensation;Obesity   Barriers to Discharge Inaccessible home environment;Decreased caregiver support   Goals   Patient Goals to get up and walk   STG Expiration Date 08/03/24   Short Term Goal #1 see eval note   PT Treatment Day 0   Plan   Treatment/Interventions ADL retraining;Functional transfer training;LE strengthening/ROM;Elevations;Therapeutic exercise;Endurance training;Patient/family training;Equipment eval/education;Bed mobility;Gait training;Spoke to nursing;Spoke to case management;OT   PT Frequency 1-2x/wk   Discharge Recommendation   Rehab Resource Intensity Level, PT II (Moderate Resource Intensity)   Equipment Recommended Walker   Walker Package Recommended Wheeled walker   AM-PAC Basic Mobility Inpatient   Turning in Flat Bed Without Bedrails 3   Lying on Back to Sitting on Edge of Flat Bed Without Bedrails 3   Moving Bed to Chair 2   Standing Up From Chair Using Arms 3   Walk in Room 2   Climb 3-5 Stairs With Railing 1   Basic Mobility Inpatient Raw Score 14   Basic Mobility Standardized Score 35.55   [facility] Highest Level Of Mobility   [facility] Goal 4: Move to chair/commode   [facility] Achieved 7: Walk 25 feet or more   End of Consult   Patient Position at End of Consult Bedside chair;All needs within reach         ASSESSMENT                                                                                                                     Meli Nowak is a 59 y.o. female admitted to [facility] on 7/23/2024 for <principal problem not specified>. Pt  has a past medical history of Cognitive impairment, Diabetes mellitus (HCC), Essential (primary) hypertension, and Psychosis ([facility]).. PT was consulted and pt was seen on 7/24/2024 for mobility assessment and d/c  planning.   Impairments limiting pt at this time include impaired balance, decreased endurance, decreased coordination, increased fall risk, new onset of impairment of functional mobility, decreased ADLS, decreased IADLS, decreased activity tolerance, decreased safety awareness, impaired judgement, decreased sensation, and decreased strength. Pt is currently functioning at a minimum assistance x1 level for bed mobility, minimum assistance x1 level for transfers, moderate assistance x1 level for ambulation with no assistive device. The patient's AM-PAC Basic Mobility Inpatient Short Form Raw Score is 14. A Raw score of less than or equal to 16 suggests the patient may benefit from discharge to post-acute rehabilitation services. Please also refer to the recommendation of the Physical Therapist for safe discharge planning.    Goals                                                                                                                                    1) Bed mobility skills with modified independent assistance to facilitate safe return to previous living environment 2) Functional transfers with modified independent assistance to facilitate safe return to previous living environment  3) Ambulation with least restrictive AD modified independent assistance without LOB and stable vitals for safe ambulation home/ community distances. 4) Stair training up/down flight 12 step/s with appropriate rail/s  and modified independent assistance for safe access to previous living environment. 5) Improve balance grades to fair + to reduce risk of falls. 6)Improve LE strength grades by 1 to increase independence w/ transfers and gait.  7) PT for ongoing pt and family education; DME needs and D/C planning to promote highest level of function in least restrictive environment.     Recommendations                                                                                                              Pt will benefit from  continued skilled IP PT to address the above mentioned impairments in order to maximize recovery and increase functional independence when completing mobility and ADLs. See flow sheet for goals and POC.     DME: Rolling [facility]    Discharge Disposition:  Post Acute Rehab Services      Murray Crawford PT, DPT

## 2024-07-24 NOTE — PROGRESS NOTES
07/24/24 0956   Referral Data   Referral Source Other (Comment)  ([facility])   Referral Reason Psych   County Information   County of Residence [facility]   Readmission Root Cause   30 Day Readmission No   Patient Information   Mental Status Confused   Primary Caregiver Self   Support System Other (Comment)  (ICM)   Synagogue/Cultural Requests Sh[employer]   Legal Information   Tx Plan Signed   (In process)   Current Status: 304   Legal Issues Unknown   Activities of Daily Living Prior to Admission   Functional Status Independent   Assistive Device No device needed   Living Arrangement Apartment;Lives alone   Ambulation Independent   Access to Firearms   Access to Firearms No   Income Information   Income Source SSI/SSD   Means of Transportation   Means of Transport to Appts: Public Transportation - Bus

## 2024-07-24 NOTE — NURSING NOTE
Patient was noncompliant with medication. Medication was crushed and put into sugar free pudding, and Meli spit it out. She is not sociable, and not visible in the milieu. 1:1 continuous close proximity continued. Denies SI or HI, endorses anxiety. She is having delusions of paranoia. Unable to assess if having hallucinations at this time. Appetite is probably inadequate. Will continue to monitor for safety and support.

## 2024-07-24 NOTE — H&P
"Psychiatric Evaluation - Behavioral Health   Meli Nowak 59 y.o. female MRN: [id number]  Unit/Bed#: [facility] 651-01 Encounter: [id number]    Assessment   Principal Problem:    Schizoaffective disorder, bipolar type (HCC)  Active Problems:    Medical clearance for psychiatric admission    Type 2 diabetes mellitus (HCC)    Obesity    Tobacco abuse    Hyperlipidemia    Esophageal reflux    Essential (primary) hypertension    Vitamin B12 deficiency    Hypoxia    Plan    Admission labs evaluated, see detailed labs below.  Collaborate with collaterals for baseline assessment and disposition.    Increase Clozaril to 125 mg daily for psychotic symptoms  Continue Ativan 1 mg 4 times daily  Continue Prozac 20 mg daily    Promote patient participation in therapeutic milieu, group therapy, and occupational therapy.  Encourage Meli Nowak to participate in nonverbal forms of therapy including journaling and art/music therapy.  Medical management by medical team.  Continue frequent safety checks and vitals per unit protocol.    The following interventions are recommended: behavioral checks every 7 minutes, continued hospitalization on locked unit     Risks, benefits and possible side effects of Medications:   Patient does not verbalize understanding at this time and will require further explanation.      Counseling / Coordination of Care:     Patient's presentation on admission and proposed treatment plan discussed with treatment team.  Diagnosis, medication changes and treatment plan reviewed with patient.  Recent stressors discussed with patient.  Events leading to admission reviewed with patient.  Importance of medication and treatment compliance reviewed with patient.       Chief Complaint: Psychosis    History of Present Illness     Consult note done by Dr. Jessi Prather, DO:  \"Meli Nowak is a 59 y.o. female, with history of schizoaffective dx, who initially presented to [facility] behavioral " "health unit on 7/3/2024  for worsening psychotic symptoms in the form of increased paranoid delusions and bizarre behavior.  Patient was transferred up to [facility] medical unit on the same day she was admitted to [facility] for altered mental status in the setting of an NICHOLAS in order to rule out catatonia versus NMS.  Encephalopathy and acute intracranial abnormalities/changes were ruled out with brain MRI and LP while patient was on medical floor.  Patient was started on Ativan for presumed catatonia and then transferred back to older adult behavioral health unit on 7/11/2024.  At that time,the patient was refusing medications and p.o. oral intake.  Medications over objection was established.  Patient was restarted on Ativan at 1 mg every 4 hours.  Patient showed little to no response to Ativan.  Patient continued to have elevated BP as well as electrolyte abnormalities and dehydration.  The decision was made to transfer patient back to the medical floor for additional workup and stabilization on 7/13/2024.  The possibility of an NG tube was suggested in order to ensure proper p.o. oral intake as well as administration of necessary psychiatric medications not available in IM form.  However, last night patient began to take in small amounts of fluids, food, medication.  Patient was restarted on Clozapine 25 mg last night. For further details for further details regarding patient's initial arrival and subsequent readmission to [facility] see HPI was written on 7/3 and 7/12 recreated below.\"    Attestation by Dr. Jordan Holter, DO:  \"Meli Nowak is a 59 y.o., overtly appearing female, possessing pertinent psychiatric history of schizoaffective disorder, presenting to [address] for medical management, subsequent to lack of appropriate oral intake likely secondary to psychosis. Initially, patient was admitted to [facility]" "inpatient behavioral health as a 303 on 07/03, however, she was transitioned to [facility] for medical management for concerns pertaining to possible neuroleptic malignant syndrome in the setting of abrupt discontinuation of Clozaril. Presently, patient remains scant, sparse, minimally interactive, somewhat suspicious and paranoid, however, possesses improved oral intake including adherence to her oral medications less any acute adverse effects that includes re-introduction of Clozaril without incident. \"    Patient was initially admitted on 07/03/2024 after being found by police acting bizarrely.  Prior to being found by police patient had abruptly stopped her Clozaril.  After initial admission patient demonstrated signs concerning for NMS versus catatonia versus meningitis and was transferred off the U 2 times.  Patient eventually was transferred to [facility] for a lumbar puncture which came back unremarkable.  After patient was medically cleared she was admitted back to the U.  While on the medical floor patient was restarted on Clozaril after washout period.  Please see above consult note and refer to progress notes from the consult team for further details in regards to her care while on the medical floor.    Meli Nowak is a 59 y.o. overtly  female, with a PPHx of agoraphobia and schizoaffective disorder, and PMHx of T2DM, HSV-2 infection, GERD, endometrial cancer, arthritis, urinary incontinence who presented to [facility] due to psychotic symptoms. Patient was admitted to the Behavioral Health Unit on a involuntary 304 commitment basis due to psychotic symptoms, paranoid ideation, bizarre behavior, disorganized behavior, confusion, and inability to care for self.    Interview was limited by patient's current mental state.  Patient was disorganized in her speech and at times perseverative about being a \"chicken.\"  Patient also demonstrated significant thought blocking and at " "times refused to answer certain questions.  Symptoms prior to admission included erratic behavior, bizarre behavior, auditory hallucinations, paranoid ideation, delusional thoughts, disorganized behavior, disorganized thinking process, noncompliance with treatment, noncompliance with medications, change in mental status, and confusion. Onset of symptoms was abrupt starting a few weeks ago with rapidly worsening course since that time.     On initial evaluation after admission to the inpatient psychiatric unit, Meli's interview was limited by patient's current mental state.  Patient was disorganized in her speech/behavior and at times perseverative about being a \"chicken.\" Regardless of question, patient expressed concern that she was either a chicken or turning into a chicken and that this would prevent her from going to heaven.  Patient also demonstrated significant thought blocking and at times refused to answer certain questions.  Patient did endorse auditory hallucinations of voices but did not go into further detail.  When asked about visual hallucinations patient did not answer but would scan around the room and appeared to be responding to internal stimuli.  Patient did express that she wanted to \"go to heaven.\"  She did not expand on this thought as to whether she wanted to actively end her own life.  Patient also has been intermittently none compliant with medications and has been spitting out p.o. medications.    Stressors:  Unable to assess due to patient factors    Patient Strengths:  Good past response to medications      Patient Barriers: chronic mental illness, noncompliant with medication, noncompliant with treatment, poor insight, uncooperative    Psychiatric Review Of Systems:  Sleep changes:  Unable to obtain due to patient factors  Appetite changes: Unable to obtain due to patient factors  Weight changes: Unable to obtain due to patient factors  Energy/anergy: Unable to obtain due to patient " "factors  Concentration: Unable to obtain due to patient factors  Interest/pleasure/anhedonia: Unable to obtain due to patient factors  Somatic symptoms: Unable to obtain due to patient factors  Anxiety/panic: Unable to obtain due to patient factors  Brianne: Unable to obtain due to patient factors  Guilty/hopeless: Unable to obtain due to patient factors  Self injurious behavior/risky behavior: Unable to obtain due to patient factors  Suicidal ideation:  Patient stated that she would like to \"go to heaven.\"  She did not expand on if this meant she wanted to actively end her own life.  Homicidal ideation: Unable to obtain due to patient factors  Auditory hallucinations:  Endorsed auditory hallucinations of voices but did not expand further  Visual hallucinations:  Refused to answer question but would scan around the room and appeared to be responding to internal stimuli  Other hallucinations: Unable to obtain due to patient factors  Delusional thinking:  Yes, patient concerned that she was either turning into a chicken or had a chicken inside of her  Eating disorder history: Unable to obtain due to patient factors  Obsessive/compulsive symptoms: Unable to obtain due to patient factors  PTSD history: Unable to obtain due to patient factors      Historical Information     Past Psychiatric History:   Past Inpatient Psychiatric Treatment:   Unable to obtain due to patient factors  Past Outpatient Psychiatric Treatment:    Unable to obtain due to patient factors  Past Suicide Attempts: Unable to obtain due to patient factors  Past Self-harm Attempts: Unable to obtain due to patient factors  Past Violent Behavior: Unable to obtain due to patient factors  Access to Firearms: Unable to obtain due to patient factors  Past Psychiatric Medication Trials: Unable to obtain due to patient factors     Substance Abuse History:  Social History       Tobacco History       Smoking Status  Every Day Current Packs/Day  0.5 packs/day " Smoking Tobacco Type  Cigarettes   Pack Year History     Packs/Day From To Years    0.5   0.0      Smokeless Tobacco Use  Never              Alcohol History       Alcohol Use Status  Not Currently Drinks/Week  0 Glasses of wine, 0 Cans of beer, 0 Shots of liquor, 0 Standard drinks or equivalent per week Comment  Unable to determine              Drug Use       Drug Use Status  No              Sexual Activity       Sexually Active  Not Currently Birth Control/Protection  Abstinence              Activities of Daily Living    Not Asked                 Additional Substance Use Detail       Questions Responses    Substance Use Assessment Unable to assess or patient unwilling to answer    Alcohol Use Frequency Denies use in past 12 months    Cannabis frequency Never used    Comment:  Never used on 7/11/2024     Heroin Frequency Denies use in past 12 months    Cocaine frequency Never used    Comment:  Never used on 7/11/2024     Crack Cocaine Frequency Denies use in past 12 months    Methamphetamine Frequency Denies use in past 12 months    Narcotic Frequency Denies use in past 12 months    Benzodiazepine Frequency Denies use in past 12 months    Amphetamine frequency Denies use in past 12 months    Barbituate Frequency Denies use use in past 12 months    Inhalant frequency Never used    Comment:  Never used on 7/11/2024     Hallucinogen frequency Never used    Comment:  Never used on 7/11/2024     Ecstasy frequency Never used    Comment:  Never used on 7/11/2024     Other drug frequency Never used    Comment:  Never used on 7/11/2024     Opiate frequency Denies use in past 12 months    Last reviewed by Irlanda Marquez RN on 7/23/2024          I am unable to assess the patient for substance use within the past 12 months as they are unable or unwilling to answer    Alcohol use: unable to obtain  Recreational drug use:   Cocaine:  unable to obtain  Heroin:  unable to obtain  Marijuana:  unable to obtain  Other drugs: Unable to  obtain    Longest clean time: unable to obtain  History of Inpatient/Outpatient rehabilitation program: unable to obtain  Smoking history:  Unable to obtain due to patient factors  Use of caffeine: unable to obtain    Family Psychiatric History:   Psychiatric Illness:  Unable to obtain due to patient factors  Substance Abuse:  Unable to obtain due to patient factors  Suicide Attempts:  Unable to obtain due to patient factors    Social History:  Education: Unable to obtain due to patient factors  Learning Disabilities: Unable to obtain due to patient factors  Marital History: Unable to obtain due to patient factors  Children: Unable to obtain due to patient factors  Living Arrangement: Unable to obtain due to patient factors  Occupational History: Unable to obtain due to patient factors  Functioning Relationships: Unable to obtain due to patient factors  Legal History: Unable to obtain due to patient factors   History: Unable to obtain due to patient factors    Traumatic History:   Abuse: Unable to obtain due to patient factors  Other Traumatic Events: Unable to obtain due to patient factors     Past Medical History:  History of Seizures: Unable to obtain due to patient factors  History of Head injury with loss of consciousness: Unable to obtain due to patient factors    Past Medical History:   Diagnosis Date    Cognitive impairment     Diabetes mellitus (HCC)     Essential (primary) hypertension     Psychosis (HCC)        Past Surgical History:   Procedure Laterality Date     SECTION      CHOLECYSTECTOMY         Medical Review Of Systems:  Review of systems not obtained due to patient factors.    Meds/Allergies   all current active meds have been reviewed  No Known Allergies    Objective   Vital signs in last 24 hours:  Temp:  [97.5 °F (36.4 °C)-98.8 °F (37.1 °C)] 97.9 °F (36.6 °C)  HR:  [] 82  Resp:  [18-24] 18  BP: (129-165)/(58-79) 129/58      Intake/Output Summary (Last 24 hours) at  "7/24/2024 1215  Last data filed at 7/24/2024 0831  Gross per 24 hour   Intake 240 ml   Output --   Net 240 ml       Mental Status Evaluation:  Appearance:  disheveled, dressed in hospital attire, looks older than stated age, poor hygiene, overweight, overtly  female, laying in bed   Behavior:  bizarre, guarded   Speech:  scant, soft   Mood:  \"All right\"   Affect:  blunted, mood-incongruent   Language: unable to assess   Thought Process:  disorganized, illogical, thought blocking , at times perseverative   Associations: loose associations, perseveration   Thought Content:  paranoid ideation   Perceptual Disturbances: Endorses auditory hallucinations of voices.  Does not answer when asked about visual hallucinations but throughout interview is seen spanning the room and appearing to respond to internal stimuli.   Risk Potential: Suicidal ideation - None at present  Homicidal ideation - None at present  Potential for aggression - No   Sensorium:  unable to assess   Memory:  recent and remote memory: unable to assess due to lack of cooperation   Consciousness:  alert and awake   Attention/Concentration: decreased concentration and decreased attention span   Intellect: unable to assess   Fund of Knowledge: Unable to assess   Insight:  poor   Judgment: poor   Muscle Strength Muscle Tone: normal  normal   Gait/Station: normal gait/station   Motor Activity: no abnormal movements     Laboratory Results: I have personally reviewed all pertinent laboratory/tests results    Results from the past 24 hours:   Recent Results (from the past 24 hour(s))   ECG 12 lead    Collection Time: 07/23/24  5:04 PM   Result Value Ref Range    Ventricular Rate 87 BPM    Atrial Rate 87 BPM    LA Interval 154 ms    QRSD Interval 94 ms    QT Interval 360 ms    QTC Interval 433 ms    P Axis 112 degrees    QRS Axis 109 degrees    T Wave Axis 132 degrees   ECG 12 lead    Collection Time: 07/23/24  5:05 PM   Result Value Ref Range    " Ventricular Rate 86 BPM    Atrial Rate 86 BPM    AL Interval 150 ms    QRSD Interval 96 ms    QT Interval 374 ms    QTC Interval 447 ms    P Axis 52 degrees    QRS Axis 62 degrees    T Wave Axis 74 degrees   Fingerstick Glucose (POCT)    Collection Time: 07/23/24  5:33 PM   Result Value Ref Range    POC Glucose 115 65 - 140 mg/dl   Fingerstick Glucose (POCT)    Collection Time: 07/23/24  9:42 PM   Result Value Ref Range    POC Glucose 101 65 - 140 mg/dl   Comprehensive metabolic panel    Collection Time: 07/24/24  6:15 AM   Result Value Ref Range    Sodium 143 135 - 147 mmol/L    Potassium 3.6 3.5 - 5.3 mmol/L    Chloride 104 96 - 108 mmol/L    CO2 30 21 - 32 mmol/L    ANION GAP 9 4 - 13 mmol/L    BUN 9 5 - 25 mg/dL    Creatinine 0.86 0.60 - 1.30 mg/dL    Glucose 105 65 - 140 mg/dL    Glucose, Fasting 105 (H) 65 - 99 mg/dL    Calcium 8.6 8.4 - 10.2 mg/dL    Corrected Calcium 9.2 8.3 - 10.1 mg/dL    AST 26 13 - 39 U/L    ALT 28 7 - 52 U/L    Alkaline Phosphatase 77 34 - 104 U/L    Total Protein 5.8 (L) 6.4 - 8.4 g/dL    Albumin 3.2 (L) 3.5 - 5.0 g/dL    Total Bilirubin 0.63 0.20 - 1.00 mg/dL    eGFR 75 ml/min/1.73sq m   Magnesium    Collection Time: 07/24/24  6:15 AM   Result Value Ref Range    Magnesium 2.3 1.9 - 2.7 mg/dL   Phosphorus    Collection Time: 07/24/24  6:15 AM   Result Value Ref Range    Phosphorus 5.3 (H) 2.7 - 4.5 mg/dL   CBC and differential    Collection Time: 07/24/24  6:15 AM   Result Value Ref Range    WBC 7.18 4.31 - 10.16 Thousand/uL    RBC 4.30 3.81 - 5.12 Million/uL    Hemoglobin 12.7 11.5 - 15.4 g/dL    Hematocrit 41.5 34.8 - 46.1 %    MCV 97 82 - 98 fL    MCH 29.5 26.8 - 34.3 pg    MCHC 30.6 (L) 31.4 - 37.4 g/dL    RDW 16.1 (H) 11.6 - 15.1 %    MPV 10.2 8.9 - 12.7 fL    Platelets 186 149 - 390 Thousands/uL    nRBC 0 /100 WBCs    Segmented % 65 43 - 75 %    Immature Grans % 1 0 - 2 %    Lymphocytes % 21 14 - 44 %    Monocytes % 12 4 - 12 %    Eosinophils Relative 0 0 - 6 %    Basophils  Relative 1 0 - 1 %    Absolute Neutrophils 4.78 1.85 - 7.62 Thousands/µL    Absolute Immature Grans 0.04 0.00 - 0.20 Thousand/uL    Absolute Lymphocytes 1.47 0.60 - 4.47 Thousands/µL    Absolute Monocytes 0.84 0.17 - 1.22 Thousand/µL    Eosinophils Absolute 0.00 0.00 - 0.61 Thousand/µL    Basophils Absolute 0.05 0.00 - 0.10 Thousands/µL   TSH, 3rd generation with Free T4 reflex    Collection Time: 07/24/24  6:15 AM   Result Value Ref Range    TSH 3RD GENERATON 2.000 0.450 - 4.500 uIU/mL   Fingerstick Glucose (POCT)    Collection Time: 07/24/24  7:47 AM   Result Value Ref Range    POC Glucose 106 65 - 140 mg/dl   Fingerstick Glucose (POCT)    Collection Time: 07/24/24 11:52 AM   Result Value Ref Range    POC Glucose 133 65 - 140 mg/dl       Imaging Studies: XR chest portable    Result Date: 7/18/2024  Narrative: XR CHEST PORTABLE INDICATION: reported choking by patient, r/o aspiration. COMPARISON: CXR 7/4/2024, chest CT 7/3/2024. FINDINGS: Clear lungs. No pneumothorax or pleural effusion. Mild cardiomegaly. Bones are unremarkable for age. Redemonstration of moderate elevation of the right diaphragm. Cholecystectomy.     Impression: No acute cardiopulmonary disease. Nothing to indicate aspiration. Workstation performed: [id number]     MRI brain w wo contrast    Result Date: 7/9/2024  Narrative: MRI BRAIN WITH AND WITHOUT CONTRAST INDICATION: Multiple neurological symptoms.. COMPARISON: CT dated 7/4/2024. TECHNIQUE: Multiplanar, multisequence imaging of the brain was performed before and after gadolinium administration. IV Contrast:  10 mL of Gadobutrol injection (SINGLE-DOSE) IMAGE QUALITY:   Mild motion artifact FINDINGS: BRAIN PARENCHYMA:  There is no discrete mass, mass effect or midline shift. There is no intracranial hemorrhage.  Normal posterior fossa.  Diffusion imaging is unremarkable. There are T2/FLAIR hyperintensities in the periventricular and subcortical white matter that are nonspecific but likely  represent mild chronic microangiopathy. Postcontrast imaging of the brain demonstrates no abnormal enhancement. VENTRICLES:  Normal for the patient's age. SELLA AND PITUITARY GLAND:  Normal. ORBITS:  Normal. PARANASAL SINUSES:  Normal. VASCULATURE:  Evaluation of the major intracranial vasculature demonstrates appropriate flow voids. CALVARIUM AND SKULL BASE:  Normal. EXTRACRANIAL SOFT TISSUES:  Normal.     Impression: No acute intracranial pathology. Mild chronic microangiopathy. Workstation performed: [id number]     Echo complete w/ contrast if indicated    Result Date: 7/8/2024  Narrative:   Left Ventricle: Left ventricular cavity size is normal. Wall thickness is increased. There is moderate to severe asymmetric septal hypertrophy. The left ventricular ejection fraction is 65%. Systolic function is vigorous. Wall motion is normal. Diastolic function is mildly abnormal, consistent with grade I (abnormal) relaxation. There is no LV dynamic obstruction at rest.  There is evidence of mild ALEXIS without septal contact.   Right Ventricle: Right ventricular cavity size is normal. Systolic function is normal. Normal tricuspid annular plane systolic excursion (TAPSE) > 1.7 cm.   Left Atrium: The atrium is mildly dilated.   Mitral Valve: There is mild annular calcification. There is mild regurgitation.   Pericardium: There is a trivial pericardial effusion.     XR follow up    Result Date: 7/8/2024  Narrative: ORBITS INDICATION:   pre mri orbits. COMPARISON:  None VIEWS:  XR FOLLOW UP (NO CHARGE) FINDINGS: There is no evidence of radiopaque orbital foreign body. The paranasal sinuses are clear.     Impression: No radiopaque orbital foreign body. Workstation performed: [id number]     XR chest portable    Result Date: 7/4/2024  Narrative: XR CHEST PORTABLE INDICATION: acute hypoxemia. COMPARISON: CXR and chest CT 7/3/2024. CXR 7/30/2019. FINDINGS: Clear lungs. No pneumothorax or pleural effusion. Normal cardiomediastinal  silhouette. Bones are unremarkable for age. Cholecystectomy. Persistent elevation of the left diaphragm.     Impression: No acute cardiopulmonary disease. Workstation performed: [id number]     CT head wo contrast    Result Date: 7/4/2024  Narrative: CT BRAIN - WITHOUT CONTRAST INDICATION:   Worsening altered mental status. COMPARISON: 7/1/2024 TECHNIQUE:  CT examination of the brain was performed.  Multiplanar 2D reformatted images were created from the source data. Radiation dose length product (DLP) for this visit:  1014 mGy-cm .  This examination, like all CT scans performed in the [facility], was performed utilizing techniques to minimize radiation dose exposure, including the use of iterative reconstruction and automated exposure control. IMAGE QUALITY:  Diagnostic. FINDINGS: PARENCHYMA:  No intracranial mass, mass effect or midline shift. No CT signs of acute infarction.  No acute parenchymal hemorrhage. A few foci of diminished white matter attenuation is probably related to microvascular ischemic change. VENTRICLES AND EXTRA-AXIAL SPACES: Sulcal prominence is proportionate to the ventricles. VISUALIZED ORBITS: Normal visualized orbits. PARANASAL SINUSES: Normal visualized paranasal sinuses. CALVARIUM AND EXTRACRANIAL SOFT TISSUES:  Normal.     Impression: No acute intracranial abnormality. Workstation performed: [id number]     XR chest portable    Result Date: 7/4/2024  Narrative: XR CHEST PORTABLE INDICATION: delirium. COMPARISON: CXR 7/30/2019, chest CT 7/3/2024. FINDINGS: Clear lungs. No pneumothorax or pleural effusion. Normal cardiomediastinal silhouette. Mild curvature of the spine. Upper abdomen normal. Cholecystectomy. Redemonstration of elevation of the right diaphragm.     Impression: No acute cardiopulmonary disease. Workstation performed: [id number]     CT chest abdomen pelvis wo contrast    Result Date: 7/3/2024  Narrative: CT CHEST, ABDOMEN AND PELVIS WITHOUT IV CONTRAST  INDICATION: Abdominal distension, AMS. COMPARISON: None. TECHNIQUE: CT examination of the chest, abdomen and pelvis was performed without intravenous contrast. Multiplanar 2D reformatted images were created from the source data. This examination, like all CT scans performed in the [facility], was performed utilizing techniques to minimize radiation dose exposure, including the use of iterative reconstruction and automated exposure control. Radiation dose length product (DLP) for this visit: 1234 mGy-cm Enteric Contrast: Not administered. FINDINGS: Absence of intravenous contrast limits evaluation of the abdominal and pelvic viscera. Evaluation is further limited due to patient positioning. CHEST LUNGS: Lungs are clear. No tracheal or endobronchial lesion. Mild coronary artery calcifications. PLEURA: Unremarkable. HEART/GREAT VESSELS: Heart is unremarkable for patient's age. No thoracic aortic aneurysm. MEDIASTINUM AND OMAR: Unremarkable. CHEST WALL AND LOWER NECK: Unremarkable. ABDOMEN LIVER/BILIARY TREE: Hepatic steatosis. No suspicious mass. Normal hepatic contours. No biliary dilation. GALLBLADDER: Post cholecystectomy. SPLEEN: Unremarkable. PANCREAS: Unremarkable. ADRENAL GLANDS: Well-circumscribed 3.1 cm fat density left adrenal nodule is considered benign, either an adenoma or myelolipoma. Unremarkable right adrenal gland. KIDNEYS/URETERS: Unremarkable. No hydronephrosis. STOMACH AND BOWEL: Duodenal diverticuli. No dilated loops of bowel. APPENDIX: Normal. ABDOMINOPELVIC CAVITY: No ascites. No pneumoperitoneum. No lymphadenopathy. VESSELS: Unremarkable for patient's age. PELVIS REPRODUCTIVE ORGANS: Post hysterectomy. URINARY BLADDER: Unremarkable. ABDOMINAL WALL/INGUINAL REGIONS: Fat-containing ventral hernia. BONES: No acute fracture or suspicious osseous lesion. Spinal degenerative changes.     Impression: No acute findings in the chest, abdomen or pelvis within the limits of unenhanced  technique. Resident: VERA Thomas I, the attending radiologist, have reviewed the images and agree with the final report above. Workstation performed: [id number]     CT head without contrast    Result Date: 7/1/2024  Narrative: CT BRAIN - WITHOUT CONTRAST INDICATION:   AMS. COMPARISON:  None. TECHNIQUE:  CT examination of the brain was performed.  Multiplanar 2D reformatted images were created from the source data. Radiation dose length product (DLP) for this visit:  917 mGy-cm .  This examination, like all CT scans performed in the [facility], was performed utilizing techniques to minimize radiation dose exposure, including the use of iterative reconstruction and automated exposure control. IMAGE QUALITY:  Diagnostic. FINDINGS: PARENCHYMA:  No intracranial mass, mass effect or midline shift. No CT signs of acute infarction.  No acute parenchymal hemorrhage. There is mild periventricular white matter low attenuation which is nonspecific and most likely related to chronic small vessel ischemic changes. VENTRICLES AND EXTRA-AXIAL SPACES:  Normal for the patient's age. VISUALIZED ORBITS: Normal visualized orbits. PARANASAL SINUSES: Normal visualized paranasal sinuses. CALVARIUM AND EXTRACRANIAL SOFT TISSUES:  Normal.     Impression: No acute intracranial abnormality. Workstation performed: [id number]       Code Status: Level 1 - Full Code  Advance Directive and Living Will: <no information>    Suicide/Homicide Risk Assessment:  Risk of Harm to Self:   The following ratings are based on assessment at the time of the interview  Nursing Suicide Risk Assessment Last 24 hours: C-SSRS Risk (Since Last Contact)  Calculated C-SSRS Risk Score (Since Last Contact): No Risk Indicated  Demographic risk factors include: , age: over 50 or older  Historical Risk Factors include: chronic psychiatric problems, history of psychosis  Current Specific Risk Factors include: recent suicidal gesture, unable to  contract for safety on the unit, mental illness diagnosis, current psychotic symptoms, presence of hallucinations, presence of delusions, presence of paranoid ideation, poor self care, poor reasoning, poor impulse control, recent inpatient psychiatric admission  Protective Factors: Protective Factors: The patient does not want to die, The patient has Lutheran beliefs forbid suicide, no current suicidal ideation, responds to redirection  Weapons/Firearms: none. The following steps have been taken to ensure weapons are properly secured: not applicable  Based on today's assessment, [place] presents the following risk of harm to self: low    Risk of Harm to Others:  The following ratings are based on assessment at the time of the interview  Nursing Homicide Risk Assessment: Violence Risk to Others: Denies within past 6 months  Demographic Risk Factors include: unemployed.  Historical Risk Factors include: none.  Current Specific Risk Factors include: current agitation, poor impulse control, current psychotic symptoms, poor insight  Protective Factors: no current homicidal ideation, follows staff redirection  Based on today's assessment, [place] presents the following risk of harm to others: minimal      Treatment Plan:     Planned Treatment and Medication Changes:  All current active medications have been reviewed  Encourage group therapy, milieu therapy and occupational therapy  Behavioral Health checks every 7 minutes  Increase Clozaril to 125mg daily    Inpatient Psychiatric Certification:     Certification: Based upon physical, mental and social evaluations, I certify that inpatient psychiatric services are medically necessary for this patient for a duration of 14 midnights for the treatment of Schizoaffective disorder, bipolar type (HCC)    Shan Fitzgerald DO 07/24/24  Psychiatry Resident, PGY-II    This note was completed in part utilizing Dragon dictation Software. Grammatical, translation, syntax errors, random word  insertions, spelling mistakes, and incomplete sentences may be an occasional consequence of this system secondary to software limitations with voice recognition, ambient noise, and hardware issues. If you have any questions or concerns about the content, text, or information contained within the body of this dictation, please contact the provider for clarification.

## 2024-07-24 NOTE — CASE MANAGEMENT
Case Management Assessment    Psychosocial Assessment 1:1:   CM met with the patient privately to re-introduce self, role of CM, and to gather background information. Patient answering some questions with CM, declines to answer other questions. CM attempted to have the patient signed ROIs for her family/ICM but the patient declined. Per chart review, it appears the patient lives alone in her apartment and at baseline does not need assistance with ADLs. The patient does have an ICM.      Admission / Details:   The patient is currently admitted on a 304 status from [facility] due to catatonia.      Residence: [address] [place] [id number]   Lives with: Self  County:   [facility]  Commitment Status: 304  Insurance: Medicare A&B  Rx coverage: Unknown  Pharmacy:  Unknown  Marital Status: Single  Children: 2 children  Support System: ICM  Level of Ed: Unknown  Work History: Unknown  Income/Source: SSDI  [employer]  Transportation: Public Transportation   Legal Issues: Unknown  Tobacco: Unknown   Hx: Unknown  Access to firearms: Unknown  MH Treatment Hx: 7/11/24- 7/13/24 [facility]; 7/2/24- 7/3/24 [facility]  Past Suicide Attempt: Unknown  Current SI/HI: Unknown  Trauma Hx: Unknown  Family Hx: Unknown  D&A Hx: Unknown  UDS Results: Negative  Medical: Please see chart  DME: None  PCP: Adriana Bradford MD   Psych: Needs referral  Therapist: None  ICM/ACT:  [facility]  Stressors: Unknown  Strengths: Unknown  Coping Skills: Unknown  ROIs Signed: No ROIs signed at this time  Treatment Plan Signed: No, patient declined  IMM Signed: No, patient declined  Disposition Plan: TBD        Additional/Collateral Information:   No ROIs signed at this time.

## 2024-07-24 NOTE — PROGRESS NOTES
07/24/24 0955   Housing Stability   In the last 12 months, was there a time when you were not able to pay the mortgage or rent on time? N   In the past 12 months, how many times have you moved where you were living? 0   At any time in the past 12 months, were you homeless or living in a shelter (including now)? N   Transportation Needs   In the past 12 months, has lack of transportation kept you from medical appointments or from getting medications? no   In the past 12 months, has lack of transportation kept you from meetings, work, or from getting things needed for daily living? No   Food Insecurity   Within the past 12 months, you worried that your food would run out before you got the money to buy more. Never true   Within the past 12 months, the food you bought just didn't last and you didn't have money to get more. Never true   Intimate Partner Violence   Within the last year, have you been afraid of your partner or ex-partner? No   Within the last year, have you been humiliated or emotionally abused in other ways by your partner or ex-partner? No   Within the last year, have you been kicked, hit, slapped, or otherwise physically hurt by your partner or ex-partner? No   Within the last year, have you been raped or forced to have any kind of sexual activity by your partner or ex-partner? No   Utilities   In the past 12 months has the electric, gas, oil, or water company threatened to shut off services in your home? No

## 2024-07-24 NOTE — DISCHARGE INSTR - OTHER ORDERS
You are being discharged to:    [facility] Residents:   *Emergencies:   If you are experiencing a mental health emergency, you may call the [facility] Crisis Intervention Office 24 hours a day, 7 days per week at [phone] or 437.   *Telephone Support Services:   When you need someone to listen, the Warmline is available from the hours of 6am- 2am, 7 days a week. No one is available from the hours of 2am-6am. A representative can be reached at [phone].     If you or someone you know is struggling or in crisis, help is available. Call or text 368 or chat [employer]. You can also reach Crisis Text Line by texting [facility] to 454371.    [facility]: [address] [place] [address]: [phone]  Mondays through Fridays: from 8:00 am to 4:00 pm *except certain holidays.  - A Clubhouse is first and foremost a local community center that offers individuals with mental illness hope and opportunities to achieve their full potential. Much more than simply a program or social service, a Clubhouse is most importantly a community of people who are working together to achieve a common goal.  - offers persons with a mental illness a safe, healing environment to explore their personal and vocational potential and receive support in achieving their goals. Instead of traditional therapy, the work of the house is the rehabilitation. As members contribute meaningful work to the house, they build confidence and a sense of purpose.  -Clubhouse MembershipBe a Member - It’s Free, Membership is free for life.

## 2024-07-24 NOTE — TREATMENT TEAM
07/24/24 0750   Team Meeting   Meeting Type Daily Rounds   Initial Conference Date 07/24/24   Team Members Present   Team Members Present Physician;Nurse;;   Physician Team Member Dr. Banuelos, Dr. Armando, Marie MOCTEZUMA   Nursing Team Member Clarissa   Care Management Team Member Mercedes   Social Work Team Member Ayse   Patient/Family Present   Patient Present No   Patient's Family Present No     Re-admit on 304 status from [facility] medical floor. On 1:1 for safety. Patient was refusing medications, needed IM Ativan. Patient O2 went down to 80s on room air, is now on 4L oxygen. Patient is semi verbal. No discharge date pending at this time.

## 2024-07-24 NOTE — ASSESSMENT & PLAN NOTE
SpO2 81% on room air, likely obesity hypoventilation syndrome and position as she is often flat in bed  ECHO shows G1DD  Encourage deep breathing   Sit upright   Follow-up CXR   Consider starting HCTZ if oral intake remains consist

## 2024-07-24 NOTE — NURSING NOTE
Pt resting intermittently overnight. When restraints attempted to be removed, pt began attempting to pull of nasal cannula. Patient appeared to be experiencing VH at times, but was reoriented by staff.

## 2024-07-24 NOTE — DISCHARGE INSTR - APPOINTMENTS
Ethel, or Ana, our Behavioral Health Nurse Navigators, will be calling you after your discharge, on the phone number that you provided.  They will be available as an additional support, if needed.   If you wish to speak with one of them, you may contact Ethel at [phone] or Ana at [phone].

## 2024-07-24 NOTE — PLAN OF CARE
Problem: Alteration in Thoughts and Perception  Goal: Verbalize thoughts and feelings  Description: Interventions:  - Promote a nonjudgmental and trusting relationship with the patient through active listening and therapeutic communication  - Assess patient's level of functioning, behavior and potential for risk  - Engage patient in 1 on 1 interactions  - Encourage patient to express fears, feelings, frustrations, and discuss symptoms    - [place] patient to reality, help patient recognize reality-based thinking   - Administer medications as ordered and assess for potential side effects  - Provide the patient education related to the signs and symptoms of the illness and desired effects of prescribed medications  Outcome: Not Progressing  Goal: Refrain from acting on delusional thinking/internal stimuli  Description: Interventions:  - Monitor patient closely, per order   - Utilize least restrictive measures   - Set reasonable limits, give positive feedback for acceptable   - Administer medications as ordered and monitor of potential side effects  Outcome: Not Progressing  Goal: Agree to be compliant with medication regime, as prescribed and report medication side effects  Description: Interventions:  - Offer appropriate PRN medication and supervise ingestion; conduct AIMS, as needed   Outcome: Not Progressing  Goal: Attend and participate in unit activities, including therapeutic, recreational, and educational groups  Description: Interventions:  -Encourage Visitation and family involvement in care  Outcome: Not Progressing  Goal: Recognize dysfunctional thoughts, communicate reality-based thoughts at the time of discharge  Description: Interventions:  - Provide medication and psycho-education to assist patient in compliance and developing insight into his/her illness   Outcome: Not Progressing     Problem: Ineffective Coping  Goal: Identifies ineffective coping skills  Outcome: Not Progressing  Goal: Identifies  healthy coping skills  Outcome: Not Progressing  Goal: Participates in unit activities  Description: Interventions:  - Provide therapeutic environment   - Provide required programming   - Redirect inappropriate behaviors   Outcome: Not Progressing  Goal: Patient/Family verbalizes awareness of resources  Outcome: Not Progressing  Goal: Understands least restrictive measures  Description: Interventions:  - Utilize least restrictive behavior  Outcome: Not Progressing  Goal: Free from restraint events  Description: - Utilize least restrictive measures   - Provide behavioral interventions   - Redirect inappropriate behaviors   Outcome: Not Progressing     Problem: Risk for Self Injury/Neglect  Goal: Verbalize thoughts and feelings  Description: Interventions:  - Assess and re-assess patient's lethality and potential for self-injury  - Engage patient in 1:1 interactions, daily, for a minimum of 15 minutes  - Encourage patient to express feelings, fears, frustrations, hopes  - Establish rapport/trust with patient   Outcome: Not Progressing  Goal: Refrain from harming self  Description: Interventions:  - Monitor patient closely, per order  - Develop a trusting relationship  - Supervise medication ingestion, monitor effects and side effects   Outcome: Not Progressing     Problem: Anxiety  Goal: Anxiety is at manageable level  Description: Interventions:  - Assess and monitor patient's anxiety level.   - Monitor for signs and symptoms (heart palpitations, chest pain, shortness of breath, headaches, nausea, feeling jumpy, restlessness, irritable, apprehensive).   - Collaborate with interdisciplinary team and initiate plan and interventions as ordered.  - [place] patient to unit/surroundings  - Explain treatment plan  - Encourage participation in care  - Encourage verbalization of concerns/fears  - Identify coping mechanisms  - Assist in developing anxiety-reducing skills  - Administer/offer alternative therapies  - Limit or  eliminate stimulants  Outcome: Not Progressing      within normal limits

## 2024-07-24 NOTE — NURSING NOTE
Restraints attempted to be released from patient and she immediately removed her oxygen. Restraints reapplied. SpO2 drops to 86% on RA. ROM, skin care, and incontinence care provided to patient. Soft wrist restraints to be continued.

## 2024-07-25 LAB
GLUCOSE SERPL-MCNC: 102 MG/DL (ref 65–140)
GLUCOSE SERPL-MCNC: 109 MG/DL (ref 65–140)
GLUCOSE SERPL-MCNC: 150 MG/DL (ref 65–140)
GLUCOSE SERPL-MCNC: 95 MG/DL (ref 65–140)

## 2024-07-25 PROCEDURE — 82948 REAGENT STRIP/BLOOD GLUCOSE: CPT

## 2024-07-25 PROCEDURE — 99232 SBSQ HOSP IP/OBS MODERATE 35: CPT | Performed by: STUDENT IN AN ORGANIZED HEALTH CARE EDUCATION/TRAINING PROGRAM

## 2024-07-25 PROCEDURE — 92610 EVALUATE SWALLOWING FUNCTION: CPT

## 2024-07-25 RX ORDER — WATER 10 ML/10ML
INJECTION INTRAMUSCULAR; INTRAVENOUS; SUBCUTANEOUS
Status: COMPLETED
Start: 2024-07-25 | End: 2024-07-25

## 2024-07-25 RX ORDER — LORAZEPAM 1 MG/1
1 TABLET ORAL 2 TIMES DAILY
Status: DISCONTINUED | OUTPATIENT
Start: 2024-07-25 | End: 2024-08-17

## 2024-07-25 RX ORDER — LORAZEPAM 2 MG/ML
0.5 INJECTION INTRAMUSCULAR 2 TIMES DAILY
Status: DISCONTINUED | OUTPATIENT
Start: 2024-07-25 | End: 2024-07-29

## 2024-07-25 RX ORDER — LORAZEPAM 2 MG/ML
1 INJECTION INTRAMUSCULAR 2 TIMES DAILY
Status: DISCONTINUED | OUTPATIENT
Start: 2024-07-25 | End: 2024-08-17

## 2024-07-25 RX ORDER — FAMOTIDINE 20 MG/1
20 TABLET, FILM COATED ORAL 2 TIMES DAILY
Status: DISCONTINUED | OUTPATIENT
Start: 2024-07-25 | End: 2024-12-30 | Stop reason: HOSPADM

## 2024-07-25 RX ORDER — POTASSIUM CHLORIDE 1500 MG/1
20 TABLET, EXTENDED RELEASE ORAL ONCE
Status: COMPLETED | OUTPATIENT
Start: 2024-07-25 | End: 2024-07-25

## 2024-07-25 RX ORDER — LORAZEPAM 0.5 MG/1
0.5 TABLET ORAL 2 TIMES DAILY
Status: DISCONTINUED | OUTPATIENT
Start: 2024-07-25 | End: 2024-07-29

## 2024-07-25 RX ADMIN — POTASSIUM CHLORIDE 20 MEQ: 1500 TABLET, EXTENDED RELEASE ORAL at 10:05

## 2024-07-25 RX ADMIN — LORAZEPAM 0.5 MG: 2 INJECTION INTRAMUSCULAR; INTRAVENOUS at 17:25

## 2024-07-25 RX ADMIN — FAMOTIDINE 20 MG: 20 TABLET ORAL at 17:25

## 2024-07-25 RX ADMIN — OLANZAPINE 5 MG: 10 INJECTION, POWDER, FOR SOLUTION INTRAMUSCULAR at 20:29

## 2024-07-25 RX ADMIN — WATER 10 ML: 1 INJECTION, SOLUTION INTRAMUSCULAR; INTRAVENOUS; SUBCUTANEOUS at 20:30

## 2024-07-25 RX ADMIN — FLUTICASONE FUROATE 1 PUFF: 100 POWDER RESPIRATORY (INHALATION) at 08:41

## 2024-07-25 RX ADMIN — FAMOTIDINE 20 MG: 20 TABLET ORAL at 10:05

## 2024-07-25 RX ADMIN — LORAZEPAM 1 MG: 2 INJECTION INTRAMUSCULAR; INTRAVENOUS at 09:19

## 2024-07-25 RX ADMIN — NICOTINE 1 PATCH: 7 PATCH, EXTENDED RELEASE TRANSDERMAL at 08:47

## 2024-07-25 RX ADMIN — LORAZEPAM 1 MG: 2 INJECTION INTRAMUSCULAR; INTRAVENOUS at 21:17

## 2024-07-25 RX ADMIN — INSULIN LISPRO 1 UNITS: 100 INJECTION, SOLUTION INTRAVENOUS; SUBCUTANEOUS at 11:45

## 2024-07-25 RX ADMIN — CARVEDILOL 6.25 MG: 6.25 TABLET, FILM COATED ORAL at 17:25

## 2024-07-25 RX ADMIN — TRAZODONE HYDROCHLORIDE 50 MG: 50 TABLET ORAL at 01:16

## 2024-07-25 RX ADMIN — LORAZEPAM 0.5 MG: 0.5 TABLET ORAL at 11:45

## 2024-07-25 RX ADMIN — QUETIAPINE FUMARATE 25 MG: 25 TABLET ORAL at 01:16

## 2024-07-25 RX ADMIN — FLUOXETINE 20 MG: 20 CAPSULE ORAL at 10:06

## 2024-07-25 NOTE — PROGRESS NOTES
"Progress Note - Behavioral Health   Meli Nowak 59 y.o. female MRN: [id number]  Unit/Bed#: [facility] 651-01 Encounter: [id number]    Assessment & Plan   Principal Problem:    Schizoaffective disorder, bipolar type (HCC)  Active Problems:    Medical clearance for psychiatric admission    Type 2 diabetes mellitus (HCC)    Obesity    Tobacco abuse    Hyperlipidemia    Esophageal reflux    Essential (primary) hypertension    Vitamin B12 deficiency    Hypoxia      Recommended Treatment:   Current medications:  Continue Clozaril to 125 mg daily for psychotic symptoms  Decrease Ativan 1 mg 4 times daily to 1 mg in the morning and at night and 0.5 mg twice daily  Continue Prozac 20 mg daily    Continue with pharmacotherapy, group therapy, milieu therapy and occupational therapy.  Continue to assess for adverse medication side effects.  Encourage Meli Nowak to participate in nonverbal forms of therapy including journaling and art/music therapy.  Continue frequent safety checks and vitals per unit protocol.  Continue to engage CM/SW to assist with collateral, disposition planning, and the implementation of an individualized, patient-centered plan of care.  Continue medical management by medical team.  Case discussed with treatment team.    Legal Status: 304  ------------------------------------------------------------    Subjective: All documentation including nursing notes, medication history to ensure medication adherence on the unit, labs, and vitals were reviewed. Meli was evaluated this morning for continuity of care and no acute distress noted throughout the evaluation. Over the past 24 hours per nursing report, Meli has been cooperative on the unit and compliant with medications.  Nursing reports patient had bowel movement yesterday.  Nursing also reports that patient continues to refuse Ativan.    Upon initial approach patient was stating that she feels \"heavy.\"  She clarified that she is feeling bloated and states " "that she had a bowel movement yesterday but none today.  Throughout interview patient was disorganized, paranoid, and experiencing thought blocking.  Patient stated that she wants to go home before following that up with stating she would like to stay.  Patient began refusing to answer questions and refusing to make eye contact with this writer.  Patient stated that she feels unsafe here but could not clarify why.  Patient appeared fearful and continued to not answer questions.    Later in the day Meli was reporting feeling \" not good.\"  Patient continued to selectively answer questions and demonstrated disorganized thinking.  Patient did not answer questions about auditory or visual hallucinations.  Throughout questioning patient was scanning room and appeared to be responding to internal stimuli.  In response to questions about suicidal ideation she stated that she wants to go to heaven.  Patient did not answer questions about homicidal ideation.  Patient did state that she does not have an appetite.  She did not answer questions about sleep.    PRNs overnight: Seroquel, trazodone  VS: Reviewed, within normal limits    Progress Toward Goals: Unchanged    Psychiatric Review of Systems:  Behavior over the last 24 hours:  unchanged  Sleep: normal  Appetite: poor  Medication side effects: No   ROS: all other systems are negative    Vital signs in last 24 hours:  Temp:  [97.8 °F (36.6 °C)-98.1 °F (36.7 °C)] 97.8 °F (36.6 °C)  HR:  [75-82] 79  Resp:  [18-19] 19  BP: (103-152)/(56-67) 152/67    Laboratory results:  I have personally reviewed all pertinent laboratory/tests results.  Recent Results (from the past 48 hour(s))   Fingerstick Glucose (POCT)    Collection Time: 07/23/24 10:23 AM   Result Value Ref Range    POC Glucose 128 65 - 140 mg/dl   ECG 12 lead    Collection Time: 07/23/24  5:04 PM   Result Value Ref Range    Ventricular Rate 87 BPM    Atrial Rate 87 BPM    RI Interval 154 ms    QRSD Interval 94 ms    QT " Interval 360 ms    QTC Interval 433 ms    P Axis 112 degrees    QRS Axis 109 degrees    T Wave Axis 132 degrees   ECG 12 lead    Collection Time: 07/23/24  5:05 PM   Result Value Ref Range    Ventricular Rate 86 BPM    Atrial Rate 86 BPM    WV Interval 150 ms    QRSD Interval 96 ms    QT Interval 374 ms    QTC Interval 447 ms    P Axis 52 degrees    QRS Axis 62 degrees    T Wave Axis 74 degrees   Fingerstick Glucose (POCT)    Collection Time: 07/23/24  5:33 PM   Result Value Ref Range    POC Glucose 115 65 - 140 mg/dl   Fingerstick Glucose (POCT)    Collection Time: 07/23/24  9:42 PM   Result Value Ref Range    POC Glucose 101 65 - 140 mg/dl   Comprehensive metabolic panel    Collection Time: 07/24/24  6:15 AM   Result Value Ref Range    Sodium 143 135 - 147 mmol/L    Potassium 3.6 3.5 - 5.3 mmol/L    Chloride 104 96 - 108 mmol/L    CO2 30 21 - 32 mmol/L    ANION GAP 9 4 - 13 mmol/L    BUN 9 5 - 25 mg/dL    Creatinine 0.86 0.60 - 1.30 mg/dL    Glucose 105 65 - 140 mg/dL    Glucose, Fasting 105 (H) 65 - 99 mg/dL    Calcium 8.6 8.4 - 10.2 mg/dL    Corrected Calcium 9.2 8.3 - 10.1 mg/dL    AST 26 13 - 39 U/L    ALT 28 7 - 52 U/L    Alkaline Phosphatase 77 34 - 104 U/L    Total Protein 5.8 (L) 6.4 - 8.4 g/dL    Albumin 3.2 (L) 3.5 - 5.0 g/dL    Total Bilirubin 0.63 0.20 - 1.00 mg/dL    eGFR 75 ml/min/1.73sq m   Magnesium    Collection Time: 07/24/24  6:15 AM   Result Value Ref Range    Magnesium 2.3 1.9 - 2.7 mg/dL   Phosphorus    Collection Time: 07/24/24  6:15 AM   Result Value Ref Range    Phosphorus 5.3 (H) 2.7 - 4.5 mg/dL   CBC and differential    Collection Time: 07/24/24  6:15 AM   Result Value Ref Range    WBC 7.18 4.31 - 10.16 Thousand/uL    RBC 4.30 3.81 - 5.12 Million/uL    Hemoglobin 12.7 11.5 - 15.4 g/dL    Hematocrit 41.5 34.8 - 46.1 %    MCV 97 82 - 98 fL    MCH 29.5 26.8 - 34.3 pg    MCHC 30.6 (L) 31.4 - 37.4 g/dL    RDW 16.1 (H) 11.6 - 15.1 %    MPV 10.2 8.9 - 12.7 fL    Platelets 186 149 - 390  "Thousands/uL    nRBC 0 /100 WBCs    Segmented % 65 43 - 75 %    Immature Grans % 1 0 - 2 %    Lymphocytes % 21 14 - 44 %    Monocytes % 12 4 - 12 %    Eosinophils Relative 0 0 - 6 %    Basophils Relative 1 0 - 1 %    Absolute Neutrophils 4.78 1.85 - 7.62 Thousands/µL    Absolute Immature Grans 0.04 0.00 - 0.20 Thousand/uL    Absolute Lymphocytes 1.47 0.60 - 4.47 Thousands/µL    Absolute Monocytes 0.84 0.17 - 1.22 Thousand/µL    Eosinophils Absolute 0.00 0.00 - 0.61 Thousand/µL    Basophils Absolute 0.05 0.00 - 0.10 Thousands/µL   TSH, 3rd generation with Free T4 reflex    Collection Time: 07/24/24  6:15 AM   Result Value Ref Range    TSH 3RD GENERATON 2.000 0.450 - 4.500 uIU/mL   Vitamin B12    Collection Time: 07/24/24  6:15 AM   Result Value Ref Range    Vitamin B-12 526 180 - 914 pg/mL   Folate    Collection Time: 07/24/24  6:15 AM   Result Value Ref Range    Folate 21.0 >5.9 ng/mL   Vitamin D 25 hydroxy    Collection Time: 07/24/24  6:15 AM   Result Value Ref Range    Vit D, 25-Hydroxy 43.6 30.0 - 100.0 ng/mL   Fingerstick Glucose (POCT)    Collection Time: 07/24/24  7:47 AM   Result Value Ref Range    POC Glucose 106 65 - 140 mg/dl   Fingerstick Glucose (POCT)    Collection Time: 07/24/24 11:52 AM   Result Value Ref Range    POC Glucose 133 65 - 140 mg/dl   Fingerstick Glucose (POCT)    Collection Time: 07/24/24  4:11 PM   Result Value Ref Range    POC Glucose 102 65 - 140 mg/dl   Fingerstick Glucose (POCT)    Collection Time: 07/24/24  8:52 PM   Result Value Ref Range    POC Glucose 102 65 - 140 mg/dl   Fingerstick Glucose (POCT)    Collection Time: 07/25/24  7:47 AM   Result Value Ref Range    POC Glucose 109 65 - 140 mg/dl         Mental Status Evaluation:    Appearance:  disheveled, dressed in hospital attire, poor hygiene, overweight, overweight  female   Behavior:  bizarre, guarded, restless and fidgety   Speech:  slow, soft   Mood:  \"Not good\"   Affect:  blunted, mood-congruent   Thought " Process:  disorganized, illogical, thought blocking   Associations: loose associations   Thought Content:  paranoid ideation   Perceptual Disturbances: Appears to be responding to internal stimuli and Appears to be internally preoccupied.  Does not answer questions about auditory and visual hallucinations.   Risk Potential: Suicidal ideation - None at present  Homicidal ideation - None at present  Potential for aggression - No   Sensorium:  oriented to person, place, and time/date   Memory:  recent and remote memory grossly intact   Consciousness:  alert and awake   Attention/Concentration: poor concentration and poor attention span   Insight:  poor   Judgment: poor   Gait/Station: normal gait/station   Motor Activity: no abnormal movements       Current Medications:  Current Facility-Administered Medications   Medication Dose Route Frequency Provider Last Rate    acetaminophen  650 mg Oral Q4H PRN JOSE ELIAS Figueroa      acetaminophen  650 mg Oral Q4H PRN JOSE ELIAS Figueroa      acetaminophen  975 mg Oral Q6H PRN JOSE ELIAS Figueroa      atorvastatin  10 mg Oral Daily JOSE ELIAS Figueroa      carvedilol  6.25 mg Oral BID With Meals JOSE ELIAS Figueroa      [START ON 7/27/2024] cloNIDine  0.1 mg Transdermal Weekly JOSE ELIAS Figueroa      cloZAPine  125 mg Oral HS Shan Fitzgerald DO      cyanocobalamin  1,000 mcg Oral Daily JOSE ELIAS Figueroa      famotidine  20 mg Oral BID JOSE ELIAS Figueroa      FLUoxetine  20 mg Oral Daily JOSE ELIAS Figueroa      fluticasone  1 puff Inhalation Daily JOSE ELIAS Figueroa      hydrOXYzine HCL  25 mg Oral Q6H PRN Max 4/day JOSE ELIAS Figueroa      hydrOXYzine HCL  50 mg Oral Q6H PRN Max 4/day JOSE ELIAS Figueroa      insulin lispro  1-5 Units Subcutaneous 4x Daily (AC & HS) JOSE ELIAS Figueroa      ipratropium-albuterol  3 mL Nebulization Q4H PRN Darin Lawton MD      LORazepam  1 mg Intramuscular Q6H PRN Max 3/day JOSE ELIAS Figueroa       LORazepam  1 mg Oral 4x Daily Kimberli Hummel MD      Or    LORazepam  1 mg Intramuscular 4x Daily Kimberli Hummel MD      LORazepam  1 mg Oral Q6H PRN Max 3/day Marie Ziegler, CRNP      melatonin  3 mg Oral HS Marie Ziegler, CRNP      Multivitamin  15 mL Oral Daily Marie Ziegler, CRNP      nicotine  1 patch Transdermal Daily Marie Ziegler, CRNP      OLANZapine  5 mg Intramuscular Q3H PRN Max 3/day Marie Ziegler, CRNP      ondansetron  4 mg Oral Q6H PRN Darin Lawton MD      polyethylene glycol  17 g Oral Daily PRN Marie Ziegler, CRNP      polyethylene glycol  17 g Oral Daily Kristen Logan, CRNP      potassium chloride  20 mEq Oral Once Kristen Logan, CRNP      QUEtiapine  100 mg Oral Q3H PRN Max 3/day Marie Ziegler, CRNP      QUEtiapine  25 mg Oral Q6H PRN Max 4/day Marie Ziegler, CRNP      QUEtiapine  50 mg Oral Q6H PRN Max 4/day Marie Ziegler, CRNP      senna-docusate sodium  1 tablet Oral Daily PRN Marie Ziegler, CRNP      traZODone  50 mg Oral HS PRN Marie Ziegler, CRVALE Fitzgerald DO 07/25/24  Psychiatry Resident, PGY-II    This note was completed in part utilizing Dragon dictation Software. Grammatical, translation, syntax errors, random word insertions, spelling mistakes, and incomplete sentences may be an occasional consequence of this system secondary to software limitations with voice recognition, ambient noise, and hardware issues. If you have any questions or concerns about the content, text, or information contained within the body of this dictation, please contact the provider for clarification.     This note was not shared with the patient due to reasonable likelihood of causing patient harm

## 2024-07-25 NOTE — NURSING NOTE
"Patient is awake and alert in bed. She has visual hallucinations, looking around the room as she is watching something. She states \"I just want to sleep but they will get me. I have two days left. They are going to choke me.\" Patient is religiously preoccupied and fixated on going to heaven. She is compliant with wearing oxygen. No respiratory distress.   "

## 2024-07-25 NOTE — NURSING NOTE
Pt provided with bed bath. During care, reddened circular area noted on left shoulder. Area is non-blanchable and did not fade as care progressed. No pressure injury noted. Image uploaded to clinical media.

## 2024-07-25 NOTE — NURSING NOTE
Pt is isolative to self and room, paranoid and delusional. Pt is responding to internal stimuli. Pt religiously preoccupied. Pt uncooperative with medication administration and required IM ativan. Pt unable to answer psychiatric questions. Pt on continuous 1:1 visual observation.

## 2024-07-25 NOTE — TREATMENT TEAM
07/25/24 0757   Team Meeting   Meeting Type Daily Rounds   Initial Conference Date 07/25/24   Team Members Present   Team Members Present Physician;Nurse;;   Physician Team Member Dr. Banuelos, Dr. Armando   Nursing Team Member Clarissa   Care Management Team Member Mercedes   Social Work Team Member Ayse   Patient/Family Present   Patient Present No   Patient's Family Present No   Pharmacy: [facility]    Patient required soft restraints yesterday to keep O2 canula in. Patient remains on 4L O2. Patient on continuous 1:1, patient remains paranoid and appears to have VH/AH. Patient is currently medications over objection. Seroquel and Ativan PRN effective for irritation and restlessness. Clozaril to be increased slowly, Ativan to be slowly decreased. No discharge date pending at this time.

## 2024-07-25 NOTE — NURSING NOTE
Pt appears to be responding to internal stimuli, heard talking out loud to self. Patient experiencing insomnia due to this. Patient attempting to remove O2 via nasal canula. Patient remains on 1:1 for safety. PRN Trazodone 50 mg and Seroquel 25 mg administered at 0116. Patient incontinent of urine. Linens changed and arturo care performed. Patient remains in bed.

## 2024-07-25 NOTE — PLAN OF CARE
Problem: Prexisting or High Potential for Compromised Skin Integrity  Goal: Skin integrity is maintained or improved  Description: INTERVENTIONS:  - Identify patients at risk for skin breakdown  - Assess and monitor skin integrity  - Assess and monitor nutrition and hydration status  - Monitor labs   - Assess for incontinence   - Turn and reposition patient  - Assist with mobility/ambulation  - Relieve pressure over bony prominences  - Avoid friction and shearing  - Provide appropriate hygiene as needed including keeping skin clean and dry  - Evaluate need for skin moisturizer/barrier cream  - Collaborate with interdisciplinary team   - Patient/family teaching  - Consider wound care consult   Outcome: Progressing     Problem: Alteration in Thoughts and Perception  Goal: Agree to be compliant with medication regime, as prescribed and report medication side effects  Description: Interventions:  - Offer appropriate PRN medication and supervise ingestion; conduct AIMS, as needed   Outcome: Progressing     Problem: Ineffective Coping  Goal: Cooperates with admission process  Description: Interventions:   - Complete admission process  Outcome: Completed  Goal: Free from restraint events  Description: - Utilize least restrictive measures   - Provide behavioral interventions   - Redirect inappropriate behaviors   Outcome: Progressing     Problem: Potential for Falls  Goal: Patient will remain free of falls  Description: INTERVENTIONS:  - Educate patient on patient safety including physical limitations  - Instruct patient to call for assistance with activity   - Consult OT/PT to assist with strengthening/mobility   - Keep Call bell within reach  - Keep bed low and locked with side rails adjusted as appropriate  - Keep care items and personal belongings within reach  - Initiate and maintain comfort rounds  - Offer Toileting every 2 Hours, in advance of need  - Initiate/Maintain bed and chair alarm  - Obtain necessary fall  risk management equipment: walker, wheelchair  - Apply yellow socks and bracelet for high fall risk patients  - Patient moved to room near nurses station  Outcome: Progressing

## 2024-07-25 NOTE — NURSING NOTE
Oneida is labile and irritable. She is suspicious of staff and medication. She is medication compliant with Jason JIANG but was uncooperative for this nurse. She is displaying signs of visual hallucinations such as frequently looking around the room and responding to internal stimulation. She was able to deny SI, HI, but endorsed depression and anxiety. She is preoccupied with her clozapine. 1:1 continuous observation maintained. She became physically uncooperative when this nurse administered her mid day insulin coverage, moving her arm quickly away multiple times. Multiple prompts and explanations were needed. She did not attend groups, is not social, withdrawn to self. Will continue to monitor for safety and support.

## 2024-07-25 NOTE — SPEECH THERAPY NOTE
Speech Pathology Bedside Swallow Evaluation:                    SLP RECOMMENDATIONS:         Diet: Level 2 [facility] soft         Liquids: Thin liquids         Medications: as best tolerated         Strategies: upright, slow rate, feeding assist       Summary:  Pt seen for bedside swallow evaluation. Pt is currently on a Regular/thin liquid diet. Pt is known to  services from stay on 7T. While on 7T pt was on a soft/thin diet.   Pt seen upright in bed with 1:1 assisting with feeding. Pt's oral mech/CN exam was grossly WNL. Pt trialed with thin liquids via straw sips with fair manipulation of bolus/coordination with straw and no overt s/s aspiration. Pt observed with puree with slow, but functional mastication/manipulation of bolus. Pt trialed with soft solids with prolonged mastication and mild to moderate oral residue. Per staff report, they have mostly been providing pt with softer foods from tray. Pt with variable ability to follow simple directions t/o evaluation.   Pt presents with at least mild oral phase dysphagia characterized by prolonged mastication, mild oral residue and decreased bite-strength. Suspect this will improve as alertness improves. No s/s suggestive of pharyngeal dysphagia observed at bedside. Of note, pt is currently on 4L O2 via NC. CXR from 7/24/24 showed no acute process. Recommend Level 2 [facility] soft/thin liquid diet. SLP to follow for diet tolerance.         Therapy Prognosis: fair   Prognosis considerations: limited carryover  Frequency: 2-4x/week pending progress      Vitals:    07/24/24 1530 07/24/24 2023 07/25/24 0537 07/25/24 0732   BP: 103/58 118/56  152/67   BP Location: Left arm   Right arm   Pulse: 82 75  79   Resp: 18 18  19   Temp: 98.1 °F (36.7 °C) 98.1 °F (36.7 °C)  97.8 °F (36.6 °C)   TempSrc: Temporal Temporal  Temporal   SpO2: 98% 91%  96%   Weight:   97.9 kg (215 lb 12.8 oz)    Height:         Lab Results   Component Value Date    WBC 7.18 07/24/2024    HGB 12.7 07/24/2024     HCT 41.5 07/24/2024    MCV 97 07/24/2024     07/24/2024         Nutrition    Goal(s):  Pt will tolerate least restrictive diet w/out s/s aspiration or oral/pharyngeal difficulties.     H&P/Admit info/ pertinent provider notes: (PMH noted above)  Per admission notes:   History of Present Illness:     Meli Nowak is a 59 y.o. female with a PMH including schizoaffective disorder, bipolar type, T2DM, obesity, and reactive airway disease who is originally admitted to the psychiatric service due to catatonia. We are consulted for medical clearance for psychiatric hospitalization and medical management. [facility]. 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 She was transferred to  medical unit for further evaluation and treatment. She was maintain on Ativan for catatonia. Neurology was consulted and recommended LP and MRI brain for which patient was sent to [facility]. Workup thus far has been negative. Lyme and autoimmune are pending. Patient was medically stabilized and transferred back to [facility]. Currently, she is resting out of bed in a recliner chair on room air. She is non verbal. She foll      Special Studies:  CXR 7/24/24:  IMPRESSION:  No focal consolidation, pleural effusion, or pneumothorax.      Previous VBS:  None     Patient's goal: none stated     Did the pt report pain? No   If yes, was nursing notified/was it addressed?    Reason for consult:  R/o aspiration  Determine safest and least restrictive diet    Precautions:    Aspiration      Food allergies:  No Known Allergies     Current diet:  Regular/thin    Premorbid diet:  Soft/thin    O2 requirements:  4L    Voice/Speech:  Low volume    Social:  Needs assist    Follows commands:  Variable    Cognitive status:  Impaired            Results d/w:  Pt, 1:1, NP

## 2024-07-25 NOTE — PROGRESS NOTES
07/25/24 0934   Team Meeting   Meeting Type Tx Team Meeting   Initial Conference Date 07/25/24   Team Members Present   Team Members Present Physician;Nurse;   Physician Team Member Dr. Banuelos   Nursing Team Member Clarissa   Care Management Team Member Mercedes   Patient/Family Present   Patient Present Yes   Patient's Family Present No     Treatment plan reviewed with the patient; patient not engaging with CM, appears to not understand at this time, tx plan not signed.  A copy of the treatment plan was put into the patient's discharge folder and the original was put in for scanning.

## 2024-07-26 LAB
ANION GAP SERPL CALCULATED.3IONS-SCNC: 7 MMOL/L (ref 4–13)
BUN SERPL-MCNC: 7 MG/DL (ref 5–25)
CALCIUM SERPL-MCNC: 9.1 MG/DL (ref 8.4–10.2)
CHLORIDE SERPL-SCNC: 102 MMOL/L (ref 96–108)
CO2 SERPL-SCNC: 34 MMOL/L (ref 21–32)
CREAT SERPL-MCNC: 0.77 MG/DL (ref 0.6–1.3)
GFR SERPL CREATININE-BSD FRML MDRD: 85 ML/MIN/1.73SQ M
GLUCOSE P FAST SERPL-MCNC: 89 MG/DL (ref 65–99)
GLUCOSE SERPL-MCNC: 153 MG/DL (ref 65–140)
GLUCOSE SERPL-MCNC: 88 MG/DL (ref 65–140)
GLUCOSE SERPL-MCNC: 89 MG/DL (ref 65–140)
GLUCOSE SERPL-MCNC: 96 MG/DL (ref 65–140)
GLUCOSE SERPL-MCNC: 96 MG/DL (ref 65–140)
MAGNESIUM SERPL-MCNC: 2.3 MG/DL (ref 1.9–2.7)
PHOSPHATE SERPL-MCNC: 4.5 MG/DL (ref 2.7–4.5)
POTASSIUM SERPL-SCNC: 3.9 MMOL/L (ref 3.5–5.3)
SODIUM SERPL-SCNC: 143 MMOL/L (ref 135–147)

## 2024-07-26 PROCEDURE — 84100 ASSAY OF PHOSPHORUS: CPT | Performed by: NURSE PRACTITIONER

## 2024-07-26 PROCEDURE — 83735 ASSAY OF MAGNESIUM: CPT | Performed by: NURSE PRACTITIONER

## 2024-07-26 PROCEDURE — 82948 REAGENT STRIP/BLOOD GLUCOSE: CPT

## 2024-07-26 PROCEDURE — 99232 SBSQ HOSP IP/OBS MODERATE 35: CPT | Performed by: STUDENT IN AN ORGANIZED HEALTH CARE EDUCATION/TRAINING PROGRAM

## 2024-07-26 PROCEDURE — 80048 BASIC METABOLIC PNL TOTAL CA: CPT | Performed by: NURSE PRACTITIONER

## 2024-07-26 PROCEDURE — 92526 ORAL FUNCTION THERAPY: CPT

## 2024-07-26 RX ORDER — POTASSIUM CHLORIDE 20MEQ/15ML
20 LIQUID (ML) ORAL ONCE
Status: DISCONTINUED | OUTPATIENT
Start: 2024-07-26 | End: 2024-07-26

## 2024-07-26 RX ADMIN — FAMOTIDINE 20 MG: 20 TABLET ORAL at 17:14

## 2024-07-26 RX ADMIN — POLYETHYLENE GLYCOL 3350 17 G: 17 POWDER, FOR SOLUTION ORAL at 08:14

## 2024-07-26 RX ADMIN — LORAZEPAM 1 MG: 1 TABLET ORAL at 21:36

## 2024-07-26 RX ADMIN — CLOZAPINE 125 MG: 100 TABLET ORAL at 21:35

## 2024-07-26 RX ADMIN — MELATONIN TAB 3 MG 3 MG: 3 TAB at 21:35

## 2024-07-26 RX ADMIN — LORAZEPAM 1 MG: 1 TABLET ORAL at 08:13

## 2024-07-26 RX ADMIN — LORAZEPAM 0.5 MG: 0.5 TABLET ORAL at 17:14

## 2024-07-26 RX ADMIN — CARVEDILOL 6.25 MG: 6.25 TABLET, FILM COATED ORAL at 08:14

## 2024-07-26 RX ADMIN — LORAZEPAM 0.5 MG: 0.5 TABLET ORAL at 12:27

## 2024-07-26 RX ADMIN — FLUOXETINE 20 MG: 20 CAPSULE ORAL at 08:14

## 2024-07-26 NOTE — PROGRESS NOTES
Progress Note - Behavioral Health   Meli Nowak 59 y.o. female MRN: [id number]  Unit/Bed#: [facility] 651-01 Encounter: [id number]    Assessment & Plan   Principal Problem:    Schizoaffective disorder, bipolar type (HCC)  Active Problems:    Medical clearance for psychiatric admission    Type 2 diabetes mellitus (HCC)    Obesity    Tobacco abuse    Hyperlipidemia    Esophageal reflux    Essential (primary) hypertension    Vitamin B12 deficiency    Hypoxia      Recommended Treatment:   Current medications:  Continue Clozaril to 125 mg daily for psychotic symptoms  Continue Ativan 1 mg in the morning and at night and 0.5 mg twice daily  Continue Prozac 20 mg daily    Continue with pharmacotherapy, group therapy, milieu therapy and occupational therapy.  Continue to assess for adverse medication side effects.  Encourage Meli Nowak to participate in nonverbal forms of therapy including journaling and art/music therapy.  Continue frequent safety checks and vitals per unit protocol.  Continue to engage CM/SW to assist with collateral, disposition planning, and the implementation of an individualized, patient-centered plan of care.  Continue medical management by medical team.  Case discussed with treatment team.    Legal Status: [id number]  ------------------------------------------------------------    Subjective: All documentation including nursing notes, medication history to ensure medication adherence on the unit, labs, and vitals were reviewed. Meli was evaluated this morning for continuity of care and no acute distress noted throughout the evaluation. Over the past 24 hours per nursing report, Meli has been cooperative on the unit and compliant with medications.  Nursing also reports that patient continues to refuse Ativan and is getting IM Ativan due to meds over objection.  They also state that she continues to be paranoid and endorsing auditory visualizations.    Today, Meli is consenting for safety on the unit.  Today  "Meli is a more verbal than days prior and appears less paranoid.  Meli reports feeling \" tired.\" Meli notes having adequate sleep. Meli states having a better appetite.  She endorses eating more of her meals yesterday and today.  Meli has been noncompliant taking medications and is currently getting meds over objection.  She is not experiencing side effects.    Meli denies suicidal ideations. Meli denies homicidal ideations. Regarding hallucinations, Meli endorses auditory hallucinations of voices but denies visual hallucinations.  Patient can be observed scanning the room and appears to be responding to internal stimuli.    PRNs overnight: Zyprexa, Seroquel  VS: Reviewed, within normal limits    Progress Toward Goals: slow improvement    Psychiatric Review of Systems:  Behavior over the last 24 hours:  improved  Sleep: normal  Appetite: improving  Medication side effects: No   ROS: all other systems are negative    Vital signs in last 24 hours:  Temp:  [97.6 °F (36.4 °C)-98 °F (36.7 °C)] 97.8 °F (36.6 °C)  HR:  [80-85] 85  Resp:  [16-20] 16  BP: (111-120)/(52-57) 111/57    Laboratory results:  I have personally reviewed all pertinent laboratory/tests results.  Recent Results (from the past 48 hour(s))   Fingerstick Glucose (POCT)    Collection Time: 07/24/24  4:11 PM   Result Value Ref Range    POC Glucose 102 65 - 140 mg/dl   Fingerstick Glucose (POCT)    Collection Time: 07/24/24  8:52 PM   Result Value Ref Range    POC Glucose 102 65 - 140 mg/dl   Fingerstick Glucose (POCT)    Collection Time: 07/25/24  7:47 AM   Result Value Ref Range    POC Glucose 109 65 - 140 mg/dl   Fingerstick Glucose (POCT)    Collection Time: 07/25/24 11:15 AM   Result Value Ref Range    POC Glucose 150 (H) 65 - 140 mg/dl   Fingerstick Glucose (POCT)    Collection Time: 07/25/24  4:12 PM   Result Value Ref Range    POC Glucose 95 65 - 140 mg/dl   Fingerstick Glucose (POCT)    Collection Time: 07/25/24  9:12 PM   Result Value Ref Range    " "POC Glucose 102 65 - 140 mg/dl   Basic metabolic panel    Collection Time: 07/26/24  4:30 AM   Result Value Ref Range    Sodium 143 135 - 147 mmol/L    Potassium 3.9 3.5 - 5.3 mmol/L    Chloride 102 96 - 108 mmol/L    CO2 34 (H) 21 - 32 mmol/L    ANION GAP 7 4 - 13 mmol/L    BUN 7 5 - 25 mg/dL    Creatinine 0.77 0.60 - 1.30 mg/dL    Glucose 89 65 - 140 mg/dL    Glucose, Fasting 89 65 - 99 mg/dL    Calcium 9.1 8.4 - 10.2 mg/dL    eGFR 85 ml/min/1.73sq m   Magnesium    Collection Time: 07/26/24  4:30 AM   Result Value Ref Range    Magnesium 2.3 1.9 - 2.7 mg/dL   Phosphorus    Collection Time: 07/26/24  4:30 AM   Result Value Ref Range    Phosphorus 4.5 2.7 - 4.5 mg/dL   Fingerstick Glucose (POCT)    Collection Time: 07/26/24  7:51 AM   Result Value Ref Range    POC Glucose 88 65 - 140 mg/dl   Fingerstick Glucose (POCT)    Collection Time: 07/26/24 11:07 AM   Result Value Ref Range    POC Glucose 153 (H) 65 - 140 mg/dl         Mental Status Evaluation:    Appearance:  disheveled, dressed in hospital attire, poor hygiene, overweight, overweight  female   Behavior:  bizarre, less guarded   Speech:  slow, soft but more talkative than days prior   Mood:  \"Tired\"   Affect:  blunted, mood-congruent   Thought Process:  Less disorganization and thought blocking   Associations: loose associations   Thought Content:  Less paranoid ideation   Perceptual Disturbances: Appears to be responding to internal stimuli and Appears to be internally preoccupied.  Endorsed auditory hallucinations but denied visual hallucinations.   Risk Potential: Suicidal ideation - None at present  Homicidal ideation - None at present  Potential for aggression - No   Sensorium:  oriented to person, place, and time/date   Memory:  recent and remote memory grossly intact   Consciousness:  alert and awake   Attention/Concentration: poor concentration and poor attention span   Insight:  poor   Judgment: poor   Gait/Station: normal gait/station "   Motor Activity: no abnormal movements       Current Medications:  Current Facility-Administered Medications   Medication Dose Route Frequency Provider Last Rate    acetaminophen  650 mg Oral Q4H PRN JOSE ELIAS Figueroa      acetaminophen  650 mg Oral Q4H PRN JOS EELIAS Figueroa      acetaminophen  975 mg Oral Q6H PRN JOSE ELIAS Figueroa      atorvastatin  10 mg Oral Daily JOSE ELIAS Figueroa      carvedilol  6.25 mg Oral BID With Meals JOSE ELIAS Figueroa      [START ON 7/27/2024] cloNIDine  0.1 mg Transdermal Weekly JOSE ELIAS Figueroa      cloZAPine  125 mg Oral HS Shan Fitzgerald, DO      cyanocobalamin  1,000 mcg Oral Daily JOSE ELIAS Figueroa      famotidine  20 mg Oral BID Shan Fitzgerald, DO      FLUoxetine  20 mg Oral Daily Shan Fitzgerald, DO      fluticasone  1 puff Inhalation Daily JOSE ELIAS Figueroa      hydrOXYzine HCL  25 mg Oral Q6H PRN Max 4/day JOSE ELIAS Figueroa      hydrOXYzine HCL  50 mg Oral Q6H PRN Max 4/day JOSE ELIAS Figueroa      insulin lispro  1-5 Units Subcutaneous 4x Daily (AC & HS) JOSE ELIAS Figueroa      ipratropium-albuterol  3 mL Nebulization Q4H PRN Darin Lawton MD      LORazepam  0.5 mg Oral BID Dereje Banuelos MD      Or    LORazepam  0.5 mg Intramuscular BID Dereje Banuelos MD      LORazepam  1 mg Intramuscular Q6H PRN Max 3/day JOSE ELIAS Figueroa      LORazepam  1 mg Oral BID Dereje Banuelos MD      Or    LORazepam  1 mg Intramuscular BID Dereje Banuelos MD      LORazepam  1 mg Oral Q6H PRN Max 3/day JOSE ELIAS Figueroa      melatonin  3 mg Oral HS JOSE ELIAS Figueroa      Multivitamin  15 mL Oral Daily JOSE ELIAS Figueroa      nicotine  1 patch Transdermal Daily JOSE ELIAS Figueroa      OLANZapine  5 mg Intramuscular Q3H PRN Max 3/day JOSE ELIAS Figueroa      ondansetron  4 mg Oral Q6H PRN Darin Lawton MD      polyethylene glycol  17 g Oral Daily PRN JOSE ELIAS Figueroa      polyethylene glycol  17 g Oral Daily Kristen Ludwig  Doreen, CRNP      potassium chloride  20 mEq Oral Once Kristen Ludwig Doreen, CRNP      QUEtiapine  100 mg Oral Q3H PRN Max 3/day Marie Ziegler, CRNP      QUEtiapine  25 mg Oral Q6H PRN Max 4/day Marie Ziegler, CRNP      QUEtiapine  50 mg Oral Q6H PRN Max 4/day Marie Ziegler, JOSE ELIAS      senna-docusate sodium  1 tablet Oral Daily PRN Marie Ziegler, JOSE ELIAS      traZODone  50 mg Oral HS PRN Marie Ziegler, JOSE ELIAS Fitzgerald DO 07/26/24  Psychiatry Resident, PGY-II    This note was completed in part utilizing Dragon dictation Software. Grammatical, translation, syntax errors, random word insertions, spelling mistakes, and incomplete sentences may be an occasional consequence of this system secondary to software limitations with voice recognition, ambient noise, and hardware issues. If you have any questions or concerns about the content, text, or information contained within the body of this dictation, please contact the provider for clarification.     This note was not shared with the patient due to reasonable likelihood of causing patient harm

## 2024-07-26 NOTE — SPEECH THERAPY NOTE
Speech Language/Pathology    Speech/Language Pathology Progress Note    Patient Name: Meli Nowak  Today's Date: 2024                     SLP RECOMMENDATIONS:         Diet: Level 2 [facility] soft        Liquids: Thin liquids         Medications: as best tolerated         Aspiration Precautions: feeding assist, upright, slow rate          Summary:  Pt seen towards conclusion of breakfast meal. Pt demonstrated slow, but functional mastication/manipulation of bolus with soft solids. No immediate overt s/s aspiration observed with thin liquids via straw sips. Pt noted to have occasional groping with straw. Pt's alertness appears slightly improved compared to previous session. Pt continues on 4L O2 via NC. Pt initially reported disliking soft foods, but then later reported liking the softer foods.Pt declined any trials of regular solids at this time d/t being full.  If alertness continues to improve will follow with trials of advanced consistencies. SLP reviewed general aspiration precautions. SLP to follow.     Assessment:  Mild oral phase dysphagia exacerbated by fatigue     Plan/Recommendations:  Level 2 [facility] soft/thin liquids   Aspiration precautions  SLP to follow         Lab Results   Component Value Date    WBC 7.18 2024    HGB 12.7 2024    HCT 41.5 2024    MCV 97 2024     2024           Problem List  Principal Problem:    Schizoaffective disorder, bipolar type (HCC)  Active Problems:    Medical clearance for psychiatric admission    Type 2 diabetes mellitus (HCC)    Obesity    Tobacco abuse    Hyperlipidemia    Esophageal reflux    Essential (primary) hypertension    Vitamin B12 deficiency    Hypoxia       Past Medical History  Past Medical History:   Diagnosis Date    Cognitive impairment     Diabetes mellitus (HCC)     Essential (primary) hypertension     Psychosis (HCC)         Past Surgical History  Past Surgical History:   Procedure Laterality Date     SECTION       CHOLECYSTECTOMY

## 2024-07-26 NOTE — PLAN OF CARE
Problem: Alteration in Thoughts and Perception  Goal: Refrain from acting on delusional thinking/internal stimuli  Description: Interventions:  - Monitor patient closely, per order   - Utilize least restrictive measures   - Set reasonable limits, give positive feedback for acceptable   - Administer medications as ordered and monitor of potential side effects  Outcome: Not Progressing  Goal: Agree to be compliant with medication regime, as prescribed and report medication side effects  Description: Interventions:  - Offer appropriate PRN medication and supervise ingestion; conduct AIMS, as needed   Outcome: Not Progressing     Problem: Risk for Self Injury/Neglect  Goal: Refrain from harming self  Description: Interventions:  - Monitor patient closely, per order  - Develop a trusting relationship  - Supervise medication ingestion, monitor effects and side effects   Outcome: Progressing     Problem: SAFETY,RESTRAINT: NV/NON-SELF DESTRUCTIVE BEHAVIOR  Goal: Remains free of harm/injury (restraint for non violent/non self-detsructive behavior)  Description: INTERVENTIONS:  - Instruct patient/family regarding restraint use   - Assess and monitor physiologic and psychological status   - Provide interventions and comfort measures to meet assessed patient needs   - Identify and implement measures to help patient regain control  - Assess readiness for release of restraint   Outcome: Progressing     Problem: Potential for Falls  Goal: Patient will remain free of falls  Description: INTERVENTIONS:  - Educate patient on patient safety including physical limitations  - Instruct patient to call for assistance with activity   - Consult OT/PT to assist with strengthening/mobility   - Keep Call bell within reach  - Keep bed low and locked with side rails adjusted as appropriate  - Keep care items and personal belongings within reach  - Initiate and maintain comfort rounds  - Offer Toileting every 2 Hours, in advance of need  -  Initiate/Maintain bed and chair alarm  - Obtain necessary fall risk management equipment: walker, wheelchair  - Apply yellow socks and bracelet for high fall risk patients  - Patient moved to room near nurses station  Outcome: Progressing

## 2024-07-26 NOTE — NURSING NOTE
Meli was more alert and coherent today than previously witnessed. She still did not attend groups but was more verbal and understandable. Signs of visual and auditory hallucinations were present, no verbal denial or acceptance. Medication compliant after being reminded she is medication over objection. She is meal complaint. 1:1 continuous maintained. Denies SI, HI, but endorses depression and anxiety. Will continue to monitor for safety and support.

## 2024-07-26 NOTE — CASE MANAGEMENT
"CM spoke with the patient at bedside. The patient was responding appropriately to CM, reporting that she doesn't think she needs oxygen but was receptive to education. The patient reported getting out of bed \"would be good\" today. CM will continue to follow through discharge.   "

## 2024-07-26 NOTE — TREATMENT TEAM
07/26/24 0738   Team Meeting   Meeting Type Daily Rounds   Initial Conference Date 07/26/24   Team Members Present   Team Members Present Physician;Nurse;;   Physician Team Member Dr. Banuelos, Dr. Armando, Marie MOCTEZUMA   Nursing Team Member Dona   Care Management Team Member Mercedes   Social Work Team Member Ayse   Patient/Family Present   Patient Present No   Patient's Family Present No     Remains on continuous 1:1 for safety. IM Ativan and IM Zyprexa given yesterday which was effective for paranoia and agitation. Patient remains on 4L oxygen. Ativan to be slowly decreased and Clozaril to be increased. Having AH and VH. No discharge date pending at this time.

## 2024-07-27 LAB
GLUCOSE SERPL-MCNC: 112 MG/DL (ref 65–140)
GLUCOSE SERPL-MCNC: 114 MG/DL (ref 65–140)
GLUCOSE SERPL-MCNC: 114 MG/DL (ref 65–140)
GLUCOSE SERPL-MCNC: 119 MG/DL (ref 65–140)

## 2024-07-27 PROCEDURE — 99232 SBSQ HOSP IP/OBS MODERATE 35: CPT | Performed by: PSYCHIATRY & NEUROLOGY

## 2024-07-27 PROCEDURE — 82948 REAGENT STRIP/BLOOD GLUCOSE: CPT

## 2024-07-27 RX ADMIN — LORAZEPAM 0.5 MG: 0.5 TABLET ORAL at 17:03

## 2024-07-27 RX ADMIN — LORAZEPAM 1 MG: 1 TABLET ORAL at 08:36

## 2024-07-27 RX ADMIN — CLOZAPINE 125 MG: 100 TABLET ORAL at 21:01

## 2024-07-27 RX ADMIN — LORAZEPAM 0.5 MG: 0.5 TABLET ORAL at 11:54

## 2024-07-27 RX ADMIN — CARVEDILOL 6.25 MG: 6.25 TABLET, FILM COATED ORAL at 16:50

## 2024-07-27 RX ADMIN — FAMOTIDINE 20 MG: 20 TABLET ORAL at 17:03

## 2024-07-27 RX ADMIN — CLONIDINE 0.1 MG: 0.1 PATCH TRANSDERMAL at 09:03

## 2024-07-27 RX ADMIN — LORAZEPAM 1 MG: 1 TABLET ORAL at 21:01

## 2024-07-27 RX ADMIN — NICOTINE 1 PATCH: 7 PATCH, EXTENDED RELEASE TRANSDERMAL at 09:03

## 2024-07-27 RX ADMIN — MELATONIN TAB 3 MG 3 MG: 3 TAB at 21:01

## 2024-07-27 NOTE — NURSING NOTE
"Patient is awake and alert. Upon assessment patient report anxiety, depression and AH of \"rock music\". Patient denies all other psych s/s, and continues to respond to internal stimuli. Patient spit medication out and asked if writing nurse was a nurse and upon confirmation and assurance this was her medication patient was compliant and cooperative with care. Safety precautions maintained.  "

## 2024-07-27 NOTE — PLAN OF CARE
Problem: DISCHARGE PLANNING - CARE MANAGEMENT  Goal: Discharge to post-acute care or home with appropriate resources  Description: INTERVENTIONS:  - Conduct assessment to determine patient/family and health care team treatment goals, and need for post-acute services based on payer coverage, community resources, and patient preferences, and barriers to discharge  - Address psychosocial, clinical, and financial barriers to discharge as identified in assessment in conjunction with the patient/family and health care team  - Arrange appropriate level of post-acute services according to patient’s   needs and preference and payer coverage in collaboration with the physician and health care team  - Communicate with and update the patient/family, physician, and health care team regarding progress on the discharge plan  - Arrange appropriate transportation to post-acute venues  Outcome: Progressing     Problem: Prexisting or High Potential for Compromised Skin Integrity  Goal: Skin integrity is maintained or improved  Description: INTERVENTIONS:  - Identify patients at risk for skin breakdown  - Assess and monitor skin integrity  - Assess and monitor nutrition and hydration status  - Monitor labs   - Assess for incontinence   - Turn and reposition patient  - Assist with mobility/ambulation  - Relieve pressure over bony prominences  - Avoid friction and shearing  - Provide appropriate hygiene as needed including keeping skin clean and dry  - Evaluate need for skin moisturizer/barrier cream  - Collaborate with interdisciplinary team   - Patient/family teaching  - Consider wound care consult   Outcome: Progressing     Problem: Alteration in Thoughts and Perception  Goal: Treatment Goal: Gain control of psychotic behaviors/thinking, reduce/eliminate presenting symptoms and demonstrate improved reality functioning upon discharge  Outcome: Progressing  Goal: Verbalize thoughts and feelings  Description: Interventions:  - Promote  a nonjudgmental and trusting relationship with the patient through active listening and therapeutic communication  - Assess patient's level of functioning, behavior and potential for risk  - Engage patient in 1 on 1 interactions  - Encourage patient to express fears, feelings, frustrations, and discuss symptoms    - [place] patient to reality, help patient recognize reality-based thinking   - Administer medications as ordered and assess for potential side effects  - Provide the patient education related to the signs and symptoms of the illness and desired effects of prescribed medications  Outcome: Progressing  Goal: Refrain from acting on delusional thinking/internal stimuli  Description: Interventions:  - Monitor patient closely, per order   - Utilize least restrictive measures   - Set reasonable limits, give positive feedback for acceptable   - Administer medications as ordered and monitor of potential side effects  Outcome: Progressing  Goal: Agree to be compliant with medication regime, as prescribed and report medication side effects  Description: Interventions:  - Offer appropriate PRN medication and supervise ingestion; conduct AIMS, as needed   Outcome: Progressing  Goal: Attend and participate in unit activities, including therapeutic, recreational, and educational groups  Description: Interventions:  -Encourage Visitation and family involvement in care  Outcome: Progressing  Goal: Recognize dysfunctional thoughts, communicate reality-based thoughts at the time of discharge  Description: Interventions:  - Provide medication and psycho-education to assist patient in compliance and developing insight into his/her illness   Outcome: Progressing  Goal: Complete daily ADLs, including personal hygiene independently, as able  Description: Interventions:  - Observe, teach, and assist patient with ADLS  - Monitor and promote a balance of rest/activity, with adequate nutrition and elimination   Outcome: Progressing      Problem: Ineffective Coping  Goal: Identifies ineffective coping skills  Outcome: Progressing  Goal: Identifies healthy coping skills  Outcome: Progressing  Goal: Demonstrates healthy coping skills  Outcome: Progressing  Goal: Participates in unit activities  Description: Interventions:  - Provide therapeutic environment   - Provide required programming   - Redirect inappropriate behaviors   Outcome: Progressing  Goal: Patient/Family participate in treatment and DC plans  Description: Interventions:  - Provide therapeutic environment  Outcome: Progressing  Goal: Patient/Family verbalizes awareness of resources  Outcome: Progressing  Goal: Understands least restrictive measures  Description: Interventions:  - Utilize least restrictive behavior  Outcome: Progressing  Goal: Free from restraint events  Description: - Utilize least restrictive measures   - Provide behavioral interventions   - Redirect inappropriate behaviors   Outcome: Progressing     Problem: Risk for Self Injury/Neglect  Goal: Treatment Goal: Remain safe during length of stay, learn and adopt new coping skills, and be free of self-injurious ideation, impulses and acts at the time of discharge  Outcome: Progressing  Goal: Verbalize thoughts and feelings  Description: Interventions:  - Assess and re-assess patient's lethality and potential for self-injury  - Engage patient in 1:1 interactions, daily, for a minimum of 15 minutes  - Encourage patient to express feelings, fears, frustrations, hopes  - Establish rapport/trust with patient   Outcome: Progressing  Goal: Refrain from harming self  Description: Interventions:  - Monitor patient closely, per order  - Develop a trusting relationship  - Supervise medication ingestion, monitor effects and side effects   Outcome: Progressing  Goal: Attend and participate in unit activities, including therapeutic, recreational, and educational groups  Description: Interventions:  - Provide therapeutic and  educational activities daily, encourage attendance and participation, and document same in the medical record  - Obtain collateral information, encourage visitation and family involvement in care   Outcome: Progressing  Goal: Recognize maladaptive responses and adopt new coping mechanisms  Outcome: Progressing  Goal: Complete daily ADLs, including personal hygiene independently, as able  Description: Interventions:  - Observe, teach, and assist patient with ADLS  - Monitor and promote a balance of rest/activity, with adequate nutrition and elimination  Outcome: Progressing     Problem: Depression  Goal: Treatment Goal: Demonstrate behavioral control of depressive symptoms, verbalize feelings of improved mood/affect, and adopt new coping skills prior to discharge  Outcome: Progressing  Goal: Verbalize thoughts and feelings  Description: Interventions:  - Assess and re-assess patient's level of risk   - Engage patient in 1:1 interactions, daily, for a minimum of 15 minutes   - Encourage patient to express feelings, fears, frustrations, hopes   Outcome: Progressing  Goal: Refrain from harming self  Description: Interventions:  - Monitor patient closely, per order   - Supervise medication ingestion, monitor effects and side effects   Outcome: Progressing  Goal: Refrain from isolation  Description: Interventions:  - Develop a trusting relationship   - Encourage socialization   Outcome: Progressing  Goal: Refrain from self-neglect  Outcome: Progressing  Goal: Attend and participate in unit activities, including therapeutic, recreational, and educational groups  Description: Interventions:  - Provide therapeutic and educational activities daily, encourage attendance and participation, and document same in the medical record   Outcome: Progressing  Goal: Complete daily ADLs, including personal hygiene independently, as able  Description: Interventions:  - Observe, teach, and assist patient with ADLS  -  Monitor and promote  a balance of rest/activity, with adequate nutrition and elimination   Outcome: Progressing     Problem: Anxiety  Goal: Anxiety is at manageable level  Description: Interventions:  - Assess and monitor patient's anxiety level.   - Monitor for signs and symptoms (heart palpitations, chest pain, shortness of breath, headaches, nausea, feeling jumpy, restlessness, irritable, apprehensive).   - Collaborate with interdisciplinary team and initiate plan and interventions as ordered.  - [place] patient to unit/surroundings  - Explain treatment plan  - Encourage participation in care  - Encourage verbalization of concerns/fears  - Identify coping mechanisms  - Assist in developing anxiety-reducing skills  - Administer/offer alternative therapies  - Limit or eliminate stimulants  Outcome: Progressing     Problem: SAFETY,RESTRAINT: NV/NON-SELF DESTRUCTIVE BEHAVIOR  Goal: Remains free of harm/injury (restraint for non violent/non self-detsructive behavior)  Description: INTERVENTIONS:  - Instruct patient/family regarding restraint use   - Assess and monitor physiologic and psychological status   - Provide interventions and comfort measures to meet assessed patient needs   - Identify and implement measures to help patient regain control  - Assess readiness for release of restraint   Outcome: Progressing  Goal: Returns to optimal restraint-free functioning  Description: INTERVENTIONS:  - Assess the patient's behavior and symptoms that indicate continued need for restraint  - Identify and implement measures to help patient regain control  - Assess readiness for release of restraint   Outcome: Progressing     Problem: Potential for Falls  Goal: Patient will remain free of falls  Description: INTERVENTIONS:  - Educate patient on patient safety including physical limitations  - Instruct patient to call for assistance with activity   - Consult OT/PT to assist with strengthening/mobility   - Keep Call bell within reach  - Keep bed low  and locked with side rails adjusted as appropriate  - Keep care items and personal belongings within reach  - Initiate and maintain comfort rounds  - Offer Toileting every 2 Hours, in advance of need  - Initiate/Maintain bed and chair alarm  - Obtain necessary fall risk management equipment: walker, wheelchair  - Apply yellow socks and bracelet for high fall risk patients  - Patient moved to room near nurses station  Outcome: Progressing     Problem: Nutrition/Hydration-ADULT  Goal: Nutrient/Hydration intake appropriate for improving, restoring or maintaining nutritional needs  Description: Monitor and assess patient's nutrition/hydration status for malnutrition. Collaborate with interdisciplinary team and initiate plan and interventions as ordered.  Monitor patient's weight and dietary intake as ordered or per policy. Utilize nutrition screening tool and intervene as necessary. Determine patient's food preferences and provide high-protein, high-caloric foods as appropriate.     INTERVENTIONS:  - Monitor oral intake, urinary output, labs, and treatment plans  - Assess nutrition and hydration status and recommend course of action  - Evaluate amount of meals eaten  - Assist patient with eating if necessary   - Allow adequate time for meals  - Recommend/ encourage appropriate diets, oral nutritional supplements, and vitamin/mineral supplements  - Order, calculate, and assess calorie counts as needed  - Recommend, monitor, and adjust tube feedings and TPN/PPN based on assessed needs  - Assess need for intravenous fluids  - Provide specific nutrition/hydration education as appropriate  - Include patient/family/caregiver in decisions related to nutrition  Outcome: Progressing

## 2024-07-27 NOTE — PROGRESS NOTES
Progress Note - Behavioral Health   Meli Nowak 59 y.o. female MRN: [id number]  Unit/Bed#: [facility] 651-01 Encounter: [id number]    The patient was seen for continuing care and reviewed with treatment team.  Remains paranoid and mistrustful of doctors refusing to be interviewed indicating that she does not trust them.  At times she has been noncompliant with medications.    Current Mental Status Evaluation:  Appearance:  disheveled but clean   Behavior:  Calm, Evasive, Guarded, and Dismissive   Mood:  Dysphoric   Affect: blunted and constricted   Speech: Sparse and Soft   Thought Process:  Unable to assess due to scant verbalization   Thought Content:  Paranoid and mistrustful   Perceptual Disturbances: Visual hallucinations   Risk Potential: No suicidal or homicidal ideation   Orientation:        Progress Toward Goals: No significant events in the past 24 hours  Principal Problem:    Schizoaffective disorder, bipolar type (HCC)  Active Problems:    Medical clearance for psychiatric admission    Type 2 diabetes mellitus (HCC)    Obesity    Tobacco abuse    Hyperlipidemia    Esophageal reflux    Essential (primary) hypertension    Vitamin B12 deficiency    Hypoxia    Discharge planning update:The patient will return to previous living arrangement    Recommended Treatment: Continue with pharmacotherapy, group therapy, milieu therapy and occupational therapy.  The patient will be maintained on the following medications:  Current Facility-Administered Medications   Medication Dose Route Frequency Provider Last Rate    acetaminophen  650 mg Oral Q4H PRN JOSE ELIAS Figueroa      acetaminophen  650 mg Oral Q4H PRN JOSE ELIAS Figueroa      acetaminophen  975 mg Oral Q6H PRN JOSE ELIAS Figueroa      atorvastatin  10 mg Oral Daily JOSE ELIAS Figueroa      carvedilol  6.25 mg Oral BID With Meals JOSE ELIAS Figueroa      cloNIDine  0.1 mg Transdermal Weekly JOSE ELIAS Figueroa      cloZAPine  125 mg Oral HS Shan  Lia, DO      cyanocobalamin  1,000 mcg Oral Daily Marie Ziegler, JOSE ELIAS      famotidine  20 mg Oral BID Shan Fitzgerald, DO      FLUoxetine  20 mg Oral Daily Shan Fitzgerald, DO      fluticasone  1 puff Inhalation Daily Marie Ziegler, JOSE ELIAS      hydrOXYzine HCL  25 mg Oral Q6H PRN Max 4/day Marie Ziegler, JOSE ELIAS      hydrOXYzine HCL  50 mg Oral Q6H PRN Max 4/day JOSE ELIAS Figueroa      insulin lispro  1-5 Units Subcutaneous 4x Daily (AC & HS) JOSE ELIAS Figueroa      ipratropium-albuterol  3 mL Nebulization Q4H PRN Darin Lawton MD      LORazepam  0.5 mg Oral BID Dereje Banuelos MD      Or    LORazepam  0.5 mg Intramuscular BID Dereje Banuelos MD      LORazepam  1 mg Intramuscular Q6H PRN Max 3/day Marie Ziegler, JOSE ELIAS      LORazepam  1 mg Oral BID Dereje Banuelos MD      Or    LORazepam  1 mg Intramuscular BID Dereje Banuelos MD      LORazepam  1 mg Oral Q6H PRN Max 3/day JOSE ELIAS Figueroa      melatonin  3 mg Oral HS JOSE ELIAS Figueroa      Multivitamin  15 mL Oral Daily Marie Ziegler, JOSE ELIAS      nicotine  1 patch Transdermal Daily JOSE ELIAS Figueroa      OLANZapine  5 mg Intramuscular Q3H PRN Max 3/day JOSE ELIAS Figueroa      ondansetron  4 mg Oral Q6H PRN Darin Lawton MD      polyethylene glycol  17 g Oral Daily PRN Marie Ziegler, JOSE ELIAS      polyethylene glycol  17 g Oral Daily Kristen Logan, JOSE ELIAS      QUEtiapine  100 mg Oral Q3H PRN Max 3/day Marie Ziegler, JOSE ELIAS      QUEtiapine  25 mg Oral Q6H PRN Max 4/day Marie Ziegler, JOSE ELIAS      QUEtiapine  50 mg Oral Q6H PRN Max 4/day Marie Ziegler, JOSE ELIAS      senna-docusate sodium  1 tablet Oral Daily PRN Marie Ziegler, JOSE ELIAS      traZODone  50 mg Oral HS PRN Marie Ziegler, JOSE ELIAS

## 2024-07-27 NOTE — NURSING NOTE
"Patient seen in her room with 1:1 staff. She appears to be responding to internal stimuli and seeing visual hallucinations in her room. Patient was agreeable to taking PO Ativan before her other medications and requested the medication with milk. The rest of her morning medications were then administered but were spit out into the cup of milk with the patient stating \"I'm sorry but if I do this they won't ever take me back. Give me another chance please.\" Patient offered reassurance and support but was not receptive, believing that she is going to die. 1:1 continual observation ongoing for safety.   "

## 2024-07-28 LAB
GLUCOSE SERPL-MCNC: 106 MG/DL (ref 65–140)
GLUCOSE SERPL-MCNC: 106 MG/DL (ref 65–140)
GLUCOSE SERPL-MCNC: 150 MG/DL (ref 65–140)
GLUCOSE SERPL-MCNC: 94 MG/DL (ref 65–140)

## 2024-07-28 PROCEDURE — 82948 REAGENT STRIP/BLOOD GLUCOSE: CPT

## 2024-07-28 PROCEDURE — 94760 N-INVAS EAR/PLS OXIMETRY 1: CPT

## 2024-07-28 PROCEDURE — 94640 AIRWAY INHALATION TREATMENT: CPT

## 2024-07-28 PROCEDURE — 94664 DEMO&/EVAL PT USE INHALER: CPT

## 2024-07-28 PROCEDURE — 99232 SBSQ HOSP IP/OBS MODERATE 35: CPT | Performed by: PSYCHIATRY & NEUROLOGY

## 2024-07-28 RX ADMIN — LORAZEPAM 0.5 MG: 2 INJECTION INTRAMUSCULAR; INTRAVENOUS at 17:35

## 2024-07-28 RX ADMIN — LORAZEPAM 1 MG: 1 TABLET ORAL at 09:02

## 2024-07-28 RX ADMIN — MELATONIN TAB 3 MG 3 MG: 3 TAB at 21:42

## 2024-07-28 RX ADMIN — INSULIN LISPRO 1 UNITS: 100 INJECTION, SOLUTION INTRAVENOUS; SUBCUTANEOUS at 21:44

## 2024-07-28 RX ADMIN — LORAZEPAM 0.5 MG: 0.5 TABLET ORAL at 11:32

## 2024-07-28 RX ADMIN — LORAZEPAM 1 MG: 1 TABLET ORAL at 21:42

## 2024-07-28 RX ADMIN — CLOZAPINE 125 MG: 100 TABLET ORAL at 21:41

## 2024-07-28 RX ADMIN — IPRATROPIUM BROMIDE AND ALBUTEROL SULFATE 3 ML: 2.5; .5 SOLUTION RESPIRATORY (INHALATION) at 06:57

## 2024-07-28 NOTE — PLAN OF CARE
Problem: DISCHARGE PLANNING - CARE MANAGEMENT  Goal: Discharge to post-acute care or home with appropriate resources  Description: INTERVENTIONS:  - Conduct assessment to determine patient/family and health care team treatment goals, and need for post-acute services based on payer coverage, community resources, and patient preferences, and barriers to discharge  - Address psychosocial, clinical, and financial barriers to discharge as identified in assessment in conjunction with the patient/family and health care team  - Arrange appropriate level of post-acute services according to patient’s   needs and preference and payer coverage in collaboration with the physician and health care team  - Communicate with and update the patient/family, physician, and health care team regarding progress on the discharge plan  - Arrange appropriate transportation to post-acute venues  Outcome: Progressing     Problem: Prexisting or High Potential for Compromised Skin Integrity  Goal: Skin integrity is maintained or improved  Description: INTERVENTIONS:  - Identify patients at risk for skin breakdown  - Assess and monitor skin integrity  - Assess and monitor nutrition and hydration status  - Monitor labs   - Assess for incontinence   - Turn and reposition patient  - Assist with mobility/ambulation  - Relieve pressure over bony prominences  - Avoid friction and shearing  - Provide appropriate hygiene as needed including keeping skin clean and dry  - Evaluate need for skin moisturizer/barrier cream  - Collaborate with interdisciplinary team   - Patient/family teaching  - Consider wound care consult   Outcome: Progressing     Problem: Alteration in Thoughts and Perception  Goal: Verbalize thoughts and feelings  Description: Interventions:  - Promote a nonjudgmental and trusting relationship with the patient through active listening and therapeutic communication  - Assess patient's level of functioning, behavior and potential for  risk  - Engage patient in 1 on 1 interactions  - Encourage patient to express fears, feelings, frustrations, and discuss symptoms    - [place] patient to reality, help patient recognize reality-based thinking   - Administer medications as ordered and assess for potential side effects  - Provide the patient education related to the signs and symptoms of the illness and desired effects of prescribed medications  Outcome: Progressing     Problem: Ineffective Coping  Goal: Free from restraint events  Description: - Utilize least restrictive measures   - Provide behavioral interventions   - Redirect inappropriate behaviors   Outcome: Progressing     Problem: Risk for Self Injury/Neglect  Goal: Treatment Goal: Remain safe during length of stay, learn and adopt new coping skills, and be free of self-injurious ideation, impulses and acts at the time of discharge  Outcome: Progressing  Goal: Verbalize thoughts and feelings  Description: Interventions:  - Assess and re-assess patient's lethality and potential for self-injury  - Engage patient in 1:1 interactions, daily, for a minimum of 15 minutes  - Encourage patient to express feelings, fears, frustrations, hopes  - Establish rapport/trust with patient   Outcome: Progressing  Goal: Refrain from harming self  Description: Interventions:  - Monitor patient closely, per order  - Develop a trusting relationship  - Supervise medication ingestion, monitor effects and side effects   Outcome: Progressing  Goal: Attend and participate in unit activities, including therapeutic, recreational, and educational groups  Description: Interventions:  - Provide therapeutic and educational activities daily, encourage attendance and participation, and document same in the medical record  - Obtain collateral information, encourage visitation and family involvement in care   Outcome: Progressing  Goal: Recognize maladaptive responses and adopt new coping mechanisms  Outcome: Progressing  Goal:  Complete daily ADLs, including personal hygiene independently, as able  Description: Interventions:  - Observe, teach, and assist patient with ADLS  - Monitor and promote a balance of rest/activity, with adequate nutrition and elimination  Outcome: Progressing     Problem: Depression  Goal: Treatment Goal: Demonstrate behavioral control of depressive symptoms, verbalize feelings of improved mood/affect, and adopt new coping skills prior to discharge  Outcome: Progressing  Goal: Verbalize thoughts and feelings  Description: Interventions:  - Assess and re-assess patient's level of risk   - Engage patient in 1:1 interactions, daily, for a minimum of 15 minutes   - Encourage patient to express feelings, fears, frustrations, hopes   Outcome: Progressing  Goal: Refrain from harming self  Description: Interventions:  - Monitor patient closely, per order   - Supervise medication ingestion, monitor effects and side effects   Outcome: Progressing     Problem: Anxiety  Goal: Anxiety is at manageable level  Description: Interventions:  - Assess and monitor patient's anxiety level.   - Monitor for signs and symptoms (heart palpitations, chest pain, shortness of breath, headaches, nausea, feeling jumpy, restlessness, irritable, apprehensive).   - Collaborate with interdisciplinary team and initiate plan and interventions as ordered.  - [place] patient to unit/surroundings  - Explain treatment plan  - Encourage participation in care  - Encourage verbalization of concerns/fears  - Identify coping mechanisms  - Assist in developing anxiety-reducing skills  - Administer/offer alternative therapies  - Limit or eliminate stimulants  Outcome: Progressing     Problem: SAFETY,RESTRAINT: NV/NON-SELF DESTRUCTIVE BEHAVIOR  Goal: Remains free of harm/injury (restraint for non violent/non self-detsructive behavior)  Description: INTERVENTIONS:  - Instruct patient/family regarding restraint use   - Assess and monitor physiologic and  psychological status   - Provide interventions and comfort measures to meet assessed patient needs   - Identify and implement measures to help patient regain control  - Assess readiness for release of restraint   Outcome: Progressing  Goal: Returns to optimal restraint-free functioning  Description: INTERVENTIONS:  - Assess the patient's behavior and symptoms that indicate continued need for restraint  - Identify and implement measures to help patient regain control  - Assess readiness for release of restraint   Outcome: Progressing     Problem: Potential for Falls  Goal: Patient will remain free of falls  Description: INTERVENTIONS:  - Educate patient on patient safety including physical limitations  - Instruct patient to call for assistance with activity   - Consult OT/PT to assist with strengthening/mobility   - Keep Call bell within reach  - Keep bed low and locked with side rails adjusted as appropriate  - Keep care items and personal belongings within reach  - Initiate and maintain comfort rounds  - Offer Toileting every 2 Hours, in advance of need  - Initiate/Maintain bed and chair alarm  - Obtain necessary fall risk management equipment: walker, wheelchair  - Apply yellow socks and bracelet for high fall risk patients  - Patient moved to room near nurses station  Outcome: Progressing     Problem: Nutrition/Hydration-ADULT  Goal: Nutrient/Hydration intake appropriate for improving, restoring or maintaining nutritional needs  Description: Monitor and assess patient's nutrition/hydration status for malnutrition. Collaborate with interdisciplinary team and initiate plan and interventions as ordered.  Monitor patient's weight and dietary intake as ordered or per policy. Utilize nutrition screening tool and intervene as necessary. Determine patient's food preferences and provide high-protein, high-caloric foods as appropriate.     INTERVENTIONS:  - Monitor oral intake, urinary output, labs, and treatment plans  -  Assess nutrition and hydration status and recommend course of action  - Evaluate amount of meals eaten  - Assist patient with eating if necessary   - Allow adequate time for meals  - Recommend/ encourage appropriate diets, oral nutritional supplements, and vitamin/mineral supplements  - Order, calculate, and assess calorie counts as needed  - Recommend, monitor, and adjust tube feedings and TPN/PPN based on assessed needs  - Assess need for intravenous fluids  - Provide specific nutrition/hydration education as appropriate  - Include patient/family/caregiver in decisions related to nutrition  Outcome: Progressing

## 2024-07-28 NOTE — PROGRESS NOTES
Progress Note - Behavioral Health   Meli Nowak 59 y.o. female MRN: [id number]  Unit/Bed#: [facility] 651-01 Encounter: [id number]    The patient was seen for continuing care and reviewed with treatment team.  There has been no significant change in her clinical status.  At times she has been noncompliant with oxygen cannula and removes it which results in respiratory distress.  She has been sleeping intermittently and her oral intake has been fair to poor    Current Mental Status Evaluation:  Appearance:  Poor eye contact and disheveled but clean   Behavior:  Calm   Mood:  Uncertain   Affect: blunted   Speech: Disarticulate   Thought Process:  Poverty of thoughts   Thought Content:  Cannot be assessed due to patient factors   Perceptual Disturbances: Cannot be assessed due to patient factors   Risk Potential: No suicidal or homicidal ideation   Orientation:        Progress Toward Goals: No significant events in the past 24 hours  Principal Problem:    Schizoaffective disorder, bipolar type (HCC)  Active Problems:    Medical clearance for psychiatric admission    Type 2 diabetes mellitus (HCC)    Obesity    Tobacco abuse    Hyperlipidemia    Esophageal reflux    Essential (primary) hypertension    Vitamin B12 deficiency    Hypoxia    Discharge planning update:The patient will return to previous living arrangement    Recommended Treatment: Continue with pharmacotherapy, group therapy, milieu therapy and occupational therapy.  The patient will be maintained on the following medications:  Current Facility-Administered Medications   Medication Dose Route Frequency Provider Last Rate    acetaminophen  650 mg Oral Q4H PRN JOSE ELIAS Figueroa      acetaminophen  650 mg Oral Q4H PRN JOSE ELIAS Figueroa      acetaminophen  975 mg Oral Q6H PRN JOSE ELIAS Figueroa      atorvastatin  10 mg Oral Daily JOSE ELIAS Figueroa      carvedilol  6.25 mg Oral BID With Meals JOSE ELIAS Figueroa      cloNIDine  0.1 mg Transdermal  Weekly Marie Ziegler, CRNP      cloZAPine  125 mg Oral HS Shan Fitzgerald, DO      cyanocobalamin  1,000 mcg Oral Daily Marie Ziegler, CRNP      famotidine  20 mg Oral BID Shan Fitzgerald, DO      FLUoxetine  20 mg Oral Daily Shan Fitzgerald, DO      fluticasone  1 puff Inhalation Daily Marie Ziegler, CRNP      hydrOXYzine HCL  25 mg Oral Q6H PRN Max 4/day Marie Ziegler, CRNP      hydrOXYzine HCL  50 mg Oral Q6H PRN Max 4/day Marie Ziegler, JOSE ELIAS      insulin lispro  1-5 Units Subcutaneous 4x Daily (AC & HS) Marie Ziegler, JOSE ELIAS      ipratropium-albuterol  3 mL Nebulization Q4H PRN Darin Lawton MD      LORazepam  0.5 mg Oral BID Dereje Banuelos MD      Or    LORazepam  0.5 mg Intramuscular BID Dereje Banuelos MD      LORazepam  1 mg Intramuscular Q6H PRN Max 3/day Marie Ziegler, JOSE ELIAS      LORazepam  1 mg Oral BID Dereje Banuelos MD      Or    LORazepam  1 mg Intramuscular BID Dereje Banuelos MD      LORazepam  1 mg Oral Q6H PRN Max 3/day Marie Ziegler, JOSE ELIAS      melatonin  3 mg Oral HS Marie Ziegler, JOSE ELIAS      Multivitamin  15 mL Oral Daily Marie Ziegler, JOSE ELIAS      nicotine  1 patch Transdermal Daily Marie Ziegler, JOSE ELIAS      OLANZapine  5 mg Intramuscular Q3H PRN Max 3/day Marie Ziegler, JOSE ELIAS      ondansetron  4 mg Oral Q6H PRN Darin Lawton MD      polyethylene glycol  17 g Oral Daily PRN Marie Ziegler, CRVALE      polyethylene glycol  17 g Oral Daily Kristen Logan, CHRISTOPHERNP      QUEtiapine  100 mg Oral Q3H PRN Max 3/day Marie Ziegler, CRNP      QUEtiapine  25 mg Oral Q6H PRN Max 4/day Marie Ziegler, CRNP      QUEtiapine  50 mg Oral Q6H PRN Max 4/day Marie Ziegler, JOSE ELIAS      senna-docusate sodium  1 tablet Oral Daily PRN Marie Ziegler, CRNP      traZODone  50 mg Oral HS PRN Marie Ziegler, JOSE ELIAS

## 2024-07-28 NOTE — NURSING NOTE
"Patient remains paranoid and suspicious throughout the shift, especially regarding medications and food. She refused her dinner medications stating \"I can't take anymore. I am out of it. I am going to pass out.\" Patient appears to be physically well with SpO2 92-94% on room air. Patient refusing to wear oxygen at this time. Ativan 0.5 mg IM administered at 1735 for refusal of PO Ativan per order. 1:1 continuous ongoing for safety. Encouraged to inform staff of any needs or concerns.    "

## 2024-07-28 NOTE — NURSING NOTE
Patient was noted to have labored breathing this am. RT was contacted- patient was given Dui- neb treatment at 0657. O2 93 at the time, diminished lung sounds. Patient's breathing had improved after treatment. Per staff, patient was not compliant with O2 canula fighting and taking it off. Will continue to monitor.

## 2024-07-29 LAB
ANION GAP SERPL CALCULATED.3IONS-SCNC: 9 MMOL/L (ref 4–13)
ATRIAL RATE: 98 BPM
BASOPHILS # BLD AUTO: 0.05 THOUSANDS/ΜL (ref 0–0.1)
BASOPHILS NFR BLD AUTO: 1 % (ref 0–1)
BUN SERPL-MCNC: 11 MG/DL (ref 5–25)
CALCIUM SERPL-MCNC: 9.4 MG/DL (ref 8.4–10.2)
CHLORIDE SERPL-SCNC: 99 MMOL/L (ref 96–108)
CK SERPL-CCNC: 95 U/L (ref 26–192)
CO2 SERPL-SCNC: 33 MMOL/L (ref 21–32)
CREAT SERPL-MCNC: 0.83 MG/DL (ref 0.6–1.3)
CRP SERPL QL: 16.6 MG/L
EOSINOPHIL # BLD AUTO: 0 THOUSAND/ΜL (ref 0–0.61)
EOSINOPHIL NFR BLD AUTO: 0 % (ref 0–6)
ERYTHROCYTE [DISTWIDTH] IN BLOOD BY AUTOMATED COUNT: 15.7 % (ref 11.6–15.1)
GFR SERPL CREATININE-BSD FRML MDRD: 78 ML/MIN/1.73SQ M
GLUCOSE P FAST SERPL-MCNC: 114 MG/DL (ref 65–99)
GLUCOSE SERPL-MCNC: 100 MG/DL (ref 65–140)
GLUCOSE SERPL-MCNC: 103 MG/DL (ref 65–140)
GLUCOSE SERPL-MCNC: 107 MG/DL (ref 65–140)
GLUCOSE SERPL-MCNC: 114 MG/DL (ref 65–140)
GLUCOSE SERPL-MCNC: 120 MG/DL (ref 65–140)
HCT VFR BLD AUTO: 46.1 % (ref 34.8–46.1)
HGB BLD-MCNC: 14.1 G/DL (ref 11.5–15.4)
IMM GRANULOCYTES # BLD AUTO: 0.03 THOUSAND/UL (ref 0–0.2)
IMM GRANULOCYTES NFR BLD AUTO: 0 % (ref 0–2)
LYMPHOCYTES # BLD AUTO: 1.67 THOUSANDS/ΜL (ref 0.6–4.47)
LYMPHOCYTES NFR BLD AUTO: 20 % (ref 14–44)
MAGNESIUM SERPL-MCNC: 2.2 MG/DL (ref 1.9–2.7)
MCH RBC QN AUTO: 29 PG (ref 26.8–34.3)
MCHC RBC AUTO-ENTMCNC: 30.6 G/DL (ref 31.4–37.4)
MCV RBC AUTO: 95 FL (ref 82–98)
MONOCYTES # BLD AUTO: 0.66 THOUSAND/ΜL (ref 0.17–1.22)
MONOCYTES NFR BLD AUTO: 8 % (ref 4–12)
NEUTROPHILS # BLD AUTO: 5.95 THOUSANDS/ΜL (ref 1.85–7.62)
NEUTS SEG NFR BLD AUTO: 71 % (ref 43–75)
NRBC BLD AUTO-RTO: 0 /100 WBCS
P AXIS: 54 DEGREES
PHOSPHATE SERPL-MCNC: 5.5 MG/DL (ref 2.7–4.5)
PLATELET # BLD AUTO: 238 THOUSANDS/UL (ref 149–390)
PMV BLD AUTO: 9.3 FL (ref 8.9–12.7)
POTASSIUM SERPL-SCNC: 4.1 MMOL/L (ref 3.5–5.3)
PR INTERVAL: 154 MS
QRS AXIS: 31 DEGREES
QRSD INTERVAL: 98 MS
QT INTERVAL: 356 MS
QTC INTERVAL: 454 MS
RBC # BLD AUTO: 4.87 MILLION/UL (ref 3.81–5.12)
SODIUM SERPL-SCNC: 141 MMOL/L (ref 135–147)
T WAVE AXIS: 85 DEGREES
VENTRICULAR RATE: 98 BPM
WBC # BLD AUTO: 8.36 THOUSAND/UL (ref 4.31–10.16)

## 2024-07-29 PROCEDURE — 93010 ELECTROCARDIOGRAM REPORT: CPT | Performed by: INTERNAL MEDICINE

## 2024-07-29 PROCEDURE — 93005 ELECTROCARDIOGRAM TRACING: CPT

## 2024-07-29 PROCEDURE — 84100 ASSAY OF PHOSPHORUS: CPT | Performed by: NURSE PRACTITIONER

## 2024-07-29 PROCEDURE — 80048 BASIC METABOLIC PNL TOTAL CA: CPT | Performed by: NURSE PRACTITIONER

## 2024-07-29 PROCEDURE — 85025 COMPLETE CBC W/AUTO DIFF WBC: CPT

## 2024-07-29 PROCEDURE — 82948 REAGENT STRIP/BLOOD GLUCOSE: CPT

## 2024-07-29 PROCEDURE — 82550 ASSAY OF CK (CPK): CPT

## 2024-07-29 PROCEDURE — 83735 ASSAY OF MAGNESIUM: CPT | Performed by: NURSE PRACTITIONER

## 2024-07-29 PROCEDURE — 86140 C-REACTIVE PROTEIN: CPT

## 2024-07-29 PROCEDURE — 99232 SBSQ HOSP IP/OBS MODERATE 35: CPT | Performed by: STUDENT IN AN ORGANIZED HEALTH CARE EDUCATION/TRAINING PROGRAM

## 2024-07-29 PROCEDURE — 92526 ORAL FUNCTION THERAPY: CPT

## 2024-07-29 RX ORDER — WATER 10 ML/10ML
INJECTION INTRAMUSCULAR; INTRAVENOUS; SUBCUTANEOUS
Status: COMPLETED
Start: 2024-07-29 | End: 2024-07-29

## 2024-07-29 RX ORDER — BISACODYL 10 MG
10 SUPPOSITORY, RECTAL RECTAL DAILY PRN
Status: DISCONTINUED | OUTPATIENT
Start: 2024-07-29 | End: 2024-07-29

## 2024-07-29 RX ORDER — BISACODYL 10 MG
10 SUPPOSITORY, RECTAL RECTAL DAILY PRN
Status: DISCONTINUED | OUTPATIENT
Start: 2024-07-29 | End: 2024-12-30 | Stop reason: HOSPADM

## 2024-07-29 RX ORDER — AMOXICILLIN 250 MG
1 CAPSULE ORAL
Status: DISCONTINUED | OUTPATIENT
Start: 2024-07-29 | End: 2024-08-16

## 2024-07-29 RX ADMIN — WATER 2.1 ML: 1 INJECTION, SOLUTION INTRAMUSCULAR; INTRAVENOUS; SUBCUTANEOUS at 23:40

## 2024-07-29 RX ADMIN — LORAZEPAM 0.5 MG: 0.5 TABLET ORAL at 11:49

## 2024-07-29 RX ADMIN — LORAZEPAM 1 MG: 2 INJECTION INTRAMUSCULAR; INTRAVENOUS at 21:04

## 2024-07-29 RX ADMIN — OLANZAPINE 5 MG: 10 INJECTION, POWDER, FOR SOLUTION INTRAMUSCULAR at 23:40

## 2024-07-29 RX ADMIN — LORAZEPAM 1 MG: 2 INJECTION INTRAMUSCULAR; INTRAVENOUS at 08:43

## 2024-07-29 NOTE — TREATMENT TEAM
07/29/24 0742   Team Meeting   Meeting Type Daily Rounds   Initial Conference Date 07/29/24   Team Members Present   Team Members Present Physician;Nurse;   Physician Team Member Dr. Banuelos, Dr. Armando, Marie MOCTEZUMA   Nursing Team Member Clarissa   Care Management Team Member Mercedes   Patient/Family Present   Patient Present No   Patient's Family Present No     Patient remains a continuous 1:1. Patient is currently medications over objection, compliant with medications at times. Continued to [facility] over the weekend, religiously preoccupied. More verbal. Patient is restless, remains on 3L of oxygen. Clozaril to be increased. Potential d/c on 8/23/24.

## 2024-07-29 NOTE — CASE MANAGEMENT
"CM attempted to engage in conversation with the patient; patient asked \"are you who you say you are?\" Patient would not answer questions regarding if she has spoken to her daughter or ICM. When asked if she was feeling better at all, patient shook head no.      "

## 2024-07-29 NOTE — NURSING NOTE
"Patient refused morning medications, requiring IM Ativan per meds over objection order. Patient stated \"Please. No more pills. I can't take it.\" She then dumped a cup of water on herself to \"cleanse with holy water.\" Patient shakes her head \"no\" to assessment questions but presents as anxious, paranoid, and appears to be responding to internal stimuli. 1:1 continued to safety. Encouraged to inform staff of any needs or concerns.    "

## 2024-07-29 NOTE — PROGRESS NOTES
"Progress Note - Behavioral Health   Meli Nowak 59 y.o. female MRN: [id number]  Unit/Bed#: [facility] 651-01 Encounter: [id number]    Patient was seen today for continuation of care, records reviewed and patient was discussed with the morning case review team.    Meli was seen today for psychiatric follow-up.  On assessment today, Meli was seen in the day room.  One-to-one at the bedside for safety, patient was noted to be still disorganized and illogical during interview.  Stating \"God will help me\", patient also exhibits poor eye contact and is noted to often be looking beyond this writer at the ceiling.  Directly questioned about visual hallucinations, patient currently denies but does appear internally preoccupied.  Ativan to be off tapered, we will discontinue 0.5 mg twice daily at this time.  ECG,CBC, CRP and CK also evaluated, will uptitrate Clozaril to 150 mg nightly to assist with symptoms.  Senokot and suppository also added into bowel regimen as LBM 7/24/2024.  No other medication change be made at this time.  Medications over objection still implemented at this time due to intermittent compliance.  Patient will need continued hospitalization due to ongoing medication management, disorganization and illogical behavior including noted Yazidism preoccupation, paranoia and internal preoccupation ongoing.    Meli denies acute suicidal/self-harm ideation/intent/plan upon direct inquiry at this time.  Meli remains behaviorally appropriate, no agitation or aggression noted on exam or in report.   Impulse control is intact.  Meli remains intermittently adherent to her current psychotropic medication regimen and denies any side effects from medications, as well as none noted on exam.    Vitals:  Vitals:    07/29/24 0745   BP: 148/71   Pulse: 91   Resp: 17   Temp: 97.6 °F (36.4 °C)   SpO2: 95%       Laboratory Results:  I have personally reviewed all pertinent laboratory/tests results.  Most Recent Labs:   Lab " Results   Component Value Date    WBC 8.36 07/29/2024    RBC 4.87 07/29/2024    HGB 14.1 07/29/2024    HCT 46.1 07/29/2024     07/29/2024    RDW 15.7 (H) 07/29/2024    NEUTROABS 5.95 07/29/2024    SODIUM 141 07/29/2024    K 4.1 07/29/2024    CL 99 07/29/2024    CO2 33 (H) 07/29/2024    BUN 11 07/29/2024    CREATININE 0.83 07/29/2024    GLUC 114 07/29/2024    GLUF 114 (H) 07/29/2024    CALCIUM 9.4 07/29/2024    AST 26 07/24/2024    ALT 28 07/24/2024    ALKPHOS 77 07/24/2024    TP 5.8 (L) 07/24/2024    ALB 3.2 (L) 07/24/2024    TBILI 0.63 07/24/2024    AMMONIA 32 07/03/2024    PKB1CXKMACQB 2.000 07/24/2024    HGBA1C 6.4 (H) 05/03/2024     05/03/2024     Clozapine:   Lab Results   Component Value Date    CLOZAPINE <75 (L) 07/22/2024     EKG   Lab Results   Component Value Date    VENTRATE 98 07/29/2024    ATRIALRATE 98 07/29/2024    PRINT 154 07/29/2024    QRSDINT 98 07/29/2024    QTINT 356 07/29/2024    QTCINT 454 07/29/2024    PAXIS 54 07/29/2024    QRSAXIS 31 07/29/2024    TWAVEAXIS 85 07/29/2024       Psychiatric Review of Systems:  Behavior over the last 24 hours:  unchanged.   Sleep: needs improvement  Appetite: needs improvement  Medication side effects:   ROS: constipation, denies shortness of breath or chest pain and all other systems are negative for acute changes    Mental Status Evaluation:  Appearance:  disheveled, marginal hygiene   Behavior:  guarded, restless and fidgety, slow responses   Speech:  soft, paucity of speech   Mood:  less irritable   Affect:  constricted   Thought Process:  disorganized, illogical, poverty of thought   Thought Content:  Mosque preoccupation, some paranoia   Perceptual Disturbances: appears preoccupied, denies when asked, but talks to self at times   Risk Potential: Suicidal ideation - None  Homicidal ideation - None  Potential for aggression - No   Memory:  patient does not answer   Sensorium  person and place      Consciousness:  alert and awake    Attention: decreased concentration and decreased attention span   Insight:  limited   Judgment: limited   Gait/Station: unable to assess   Motor Activity: no abnormal movements   Progress Toward Goals:   Meli is progressing towards goals of inpatient psychiatric treatment by continued medication compliance and is attending therapeutic modalities on the milieu. However, the patient continues to require inpatient psychiatric hospitalization for continued medication management and titration to optimize symptom reduction, improve sleep hygiene, and demonstrate adequate self-care.    Assessment & Plan   Principal Problem:    Schizoaffective disorder, bipolar type (HCC)  Active Problems:    Medical clearance for psychiatric admission    Type 2 diabetes mellitus (HCC)    Obesity    Tobacco abuse    Hyperlipidemia    Esophageal reflux    Essential (primary) hypertension    Vitamin B12 deficiency    Hypoxia      Recommended Treatment: Treatment plan and medication changes discussed and per the attending physician the plan is:    1.Continue with group therapy, milieu therapy and occupational therapy  2.Behavioral Health checks every 7 minutes  3.Continue frequent safety checks and vitals per unit protocol  4.Continue with [facility] medical management as indicated  5.Continue with current medication regimen  6.Will review labs in the a.m.  7.Disposition Planning: Discharge planning and efforts remain ongoing    Behavioral Health Medications: all current active meds have been reviewed and continue current psychiatric medications.  Current Facility-Administered Medications   Medication Dose Route Frequency Provider Last Rate    acetaminophen  650 mg Oral Q4H PRN JOSE ELIAS Figueroa      acetaminophen  650 mg Oral Q4H PRN JOSE ELIAS Figueroa      acetaminophen  975 mg Oral Q6H PRN JOSE ELIAS Figueroa      atorvastatin  10 mg Oral Daily JOSE ELIAS Figueroa      bisacodyl  10 mg Rectal Daily PRN JOSE ELIAS Figueroa       carvedilol  6.25 mg Oral BID With Meals Marie Ziegler, JOSE ELIAS      cloNIDine  0.1 mg Transdermal Weekly Marie Ziegler, CRNP      cloZAPine  150 mg Oral HS Marei Ziegler, CRNP      cyanocobalamin  1,000 mcg Oral Daily Marie Ziegler, CRNP      famotidine  20 mg Oral BID Shan Fitzgerald, DO      FLUoxetine  20 mg Oral Daily Shan Fitzgerald, DO      fluticasone  1 puff Inhalation Daily Marie Ziegler, CRVALE      hydrOXYzine HCL  25 mg Oral Q6H PRN Max 4/day Marie Ziegler, CRNP      hydrOXYzine HCL  50 mg Oral Q6H PRN Max 4/day Marie Ziegler, JOSE ELIAS      insulin lispro  1-5 Units Subcutaneous 4x Daily (AC & HS) Marie Ziegler, JOSE ELIAS      ipratropium-albuterol  3 mL Nebulization Q4H PRN Darin Lawton MD      LORazepam  1 mg Intramuscular Q6H PRN Max 3/day Marie Ziegler, JOSE ELIAS      LORazepam  1 mg Oral BID Dereje Banuelos MD      Or    LORazepam  1 mg Intramuscular BID Dereje Banuelos MD      LORazepam  1 mg Oral Q6H PRN Max 3/day Marie Ziegler, JOSE ELIAS      melatonin  3 mg Oral HS Marie Ziegler, JOSE LEIAS      Multivitamin  15 mL Oral Daily Marie Ziegler, CRNP      nicotine  1 patch Transdermal Daily Marie Ziegler, JOSE ELIAS      OLANZapine  5 mg Intramuscular Q3H PRN Max 3/day Marie Ziegler, JOSE ELIAS      ondansetron  4 mg Oral Q6H PRN Darin Lawton MD      polyethylene glycol  17 g Oral Daily PRN Marie Ziegler, CRVALE      polyethylene glycol  17 g Oral Daily Kristen Logan, JOSE ELIAS      QUEtiapine  100 mg Oral Q3H PRN Max 3/day Marie Ziegler, CRNP      QUEtiapine  25 mg Oral Q6H PRN Max 4/day Marie Ziegler, CRNP      QUEtiapine  50 mg Oral Q6H PRN Max 4/day Marie Ziegler, JOSE ELIAS      senna-docusate sodium  1 tablet Oral HS Marie Ziegler, CRNP      traZODone  50 mg Oral HS PRN Marie Ziegler, JOSE ELIAS         Risks / Benefits of Treatment:  Patient is not likely to improve without medications and requires administration of injectable medications against her will for refusal of oral medications to assure  safety of self and others  Risks, benefits, and possible side effects of medications explained to patient. Patient does not verbalize understanding of risks and benefits of treatment at this time and will require further explanation.      Counseling / Coordination of Care:  Patient's progress reviewed with nursing staff.  Medications, treatment progress and treatment plan reviewed with patient.  Supportive counseling provided to the patient.    Total floor/unit time spent today 25 minutes. Greater than 50% of total time was spent with the patient and / or family counseling and / or coordination of care. A description of the counseling / coordination of care: medication education, treatment plan, supportive therapy.    This note was completed in part utilizing Dragon dictation Software. Grammatical, translation, syntax errors, random word insertions, spelling mistakes, and incomplete sentences may be an occasional consequence of this system secondary to software limitations with voice recognition, ambient noise, and hardware issues. If you have any questions or concerns about the content, text, or information contained within the body of this dictation, please contact the provider for clarification

## 2024-07-29 NOTE — NURSING NOTE
Pt present on the unit and continues on 1:1 for safety and support. Pt noted with [facility] today. No behaviors noted. Medication compliant and continue on closed-observation monitoring.

## 2024-07-29 NOTE — PLAN OF CARE
Problem: Alteration in Thoughts and Perception  Goal: Treatment Goal: Gain control of psychotic behaviors/thinking, reduce/eliminate presenting symptoms and demonstrate improved reality functioning upon discharge  Outcome: Progressing  Goal: Verbalize thoughts and feelings  Description: Interventions:  - Promote a nonjudgmental and trusting relationship with the patient through active listening and therapeutic communication  - Assess patient's level of functioning, behavior and potential for risk  - Engage patient in 1 on 1 interactions  - Encourage patient to express fears, feelings, frustrations, and discuss symptoms    - [place] patient to reality, help patient recognize reality-based thinking   - Administer medications as ordered and assess for potential side effects  - Provide the patient education related to the signs and symptoms of the illness and desired effects of prescribed medications  Outcome: Progressing  Goal: Refrain from acting on delusional thinking/internal stimuli  Description: Interventions:  - Monitor patient closely, per order   - Utilize least restrictive measures   - Set reasonable limits, give positive feedback for acceptable   - Administer medications as ordered and monitor of potential side effects  Outcome: Not Progressing  Goal: Agree to be compliant with medication regime, as prescribed and report medication side effects  Description: Interventions:  - Offer appropriate PRN medication and supervise ingestion; conduct AIMS, as needed   Outcome: Progressing  Goal: Attend and participate in unit activities, including therapeutic, recreational, and educational groups  Description: Interventions:  -Encourage Visitation and family involvement in care  Outcome: Not Progressing  Goal: Recognize dysfunctional thoughts, communicate reality-based thoughts at the time of discharge  Description: Interventions:  - Provide medication and psycho-education to assist patient in compliance and  developing insight into his/her illness   Outcome: Not Progressing  Goal: Complete daily ADLs, including personal hygiene independently, as able  Description: Interventions:  - Observe, teach, and assist patient with ADLS  - Monitor and promote a balance of rest/activity, with adequate nutrition and elimination   Outcome: Not Progressing     Problem: Depression  Goal: Treatment Goal: Demonstrate behavioral control of depressive symptoms, verbalize feelings of improved mood/affect, and adopt new coping skills prior to discharge  Outcome: Progressing  Goal: Verbalize thoughts and feelings  Description: Interventions:  - Assess and re-assess patient's level of risk   - Engage patient in 1:1 interactions, daily, for a minimum of 15 minutes   - Encourage patient to express feelings, fears, frustrations, hopes   Outcome: Progressing  Goal: Refrain from harming self  Description: Interventions:  - Monitor patient closely, per order   - Supervise medication ingestion, monitor effects and side effects   Outcome: Progressing  Goal: Refrain from isolation  Description: Interventions:  - Develop a trusting relationship   - Encourage socialization   Outcome: Progressing  Goal: Refrain from self-neglect  Outcome: Progressing  Goal: Complete daily ADLs, including personal hygiene independently, as able  Description: Interventions:  - Observe, teach, and assist patient with ADLS  -  Monitor and promote a balance of rest/activity, with adequate nutrition and elimination   Outcome: Progressing     Problem: Anxiety  Goal: Anxiety is at manageable level  Description: Interventions:  - Assess and monitor patient's anxiety level.   - Monitor for signs and symptoms (heart palpitations, chest pain, shortness of breath, headaches, nausea, feeling jumpy, restlessness, irritable, apprehensive).   - Collaborate with interdisciplinary team and initiate plan and interventions as ordered.  - [place] patient to unit/surroundings  - Explain  treatment plan  - Encourage participation in care  - Encourage verbalization of concerns/fears  - Identify coping mechanisms  - Assist in developing anxiety-reducing skills  - Administer/offer alternative therapies  - Limit or eliminate stimulants  Outcome: Progressing     Problem: SAFETY,RESTRAINT: NV/NON-SELF DESTRUCTIVE BEHAVIOR  Goal: Remains free of harm/injury (restraint for non violent/non self-detsructive behavior)  Description: INTERVENTIONS:  - Instruct patient/family regarding restraint use   - Assess and monitor physiologic and psychological status   - Provide interventions and comfort measures to meet assessed patient needs   - Identify and implement measures to help patient regain control  - Assess readiness for release of restraint   Outcome: Progressing

## 2024-07-29 NOTE — SPEECH THERAPY NOTE
Speech Language/Pathology    Speech/Language Pathology Progress Note    Patient Name: Meli Nowak  Today's Date: 2024                     SLP RECOMMENDATIONS:         Diet: Level 2 [facility] soft         Liquids: Thin liquids         Medications: as best tolerated         Aspiration Precautions: upright, feeding assist as needed, slow rate        Summary:  Pt with limited participation in session and refused AM meds from RN. Pt on RA this session. Pt declined majority of breakfast meal. Pt seen with small sips of thin liquid with no overt s/s aspiration. SLP offered pt trials of advanced textures, however pt declined despite being given multiple food options. Pt with limited verbal output and difficulty attending to SLP t/o session. Recommend continuing current diet. SLP to follow.     Assessment:  Limited by participation, no overt s/s aspiration with small sips of water     Plan/Recommendations:  [facility]   Aspiration precautions  Feeding assist as needed   SLP to follow         Lab Results   Component Value Date    WBC 8.36 2024    HGB 14.1 2024    HCT 46.1 2024    MCV 95 2024     2024           Problem List  Principal Problem:    Schizoaffective disorder, bipolar type (HCC)  Active Problems:    Medical clearance for psychiatric admission    Type 2 diabetes mellitus (HCC)    Obesity    Tobacco abuse    Hyperlipidemia    Esophageal reflux    Essential (primary) hypertension    Vitamin B12 deficiency    Hypoxia       Past Medical History  Past Medical History:   Diagnosis Date    Cognitive impairment     Diabetes mellitus (HCC)     Essential (primary) hypertension     Psychosis (HCC)         Past Surgical History  Past Surgical History:   Procedure Laterality Date     SECTION      CHOLECYSTECTOMY

## 2024-07-30 LAB
GLUCOSE SERPL-MCNC: 118 MG/DL (ref 65–140)
GLUCOSE SERPL-MCNC: 122 MG/DL (ref 65–140)
GLUCOSE SERPL-MCNC: 94 MG/DL (ref 65–140)
GLUCOSE SERPL-MCNC: 97 MG/DL (ref 65–140)

## 2024-07-30 PROCEDURE — 82948 REAGENT STRIP/BLOOD GLUCOSE: CPT

## 2024-07-30 PROCEDURE — 92526 ORAL FUNCTION THERAPY: CPT

## 2024-07-30 PROCEDURE — 99232 SBSQ HOSP IP/OBS MODERATE 35: CPT | Performed by: STUDENT IN AN ORGANIZED HEALTH CARE EDUCATION/TRAINING PROGRAM

## 2024-07-30 RX ADMIN — LORAZEPAM 1 MG: 1 TABLET ORAL at 09:49

## 2024-07-30 RX ADMIN — LORAZEPAM 1 MG: 2 INJECTION INTRAMUSCULAR; INTRAVENOUS at 21:06

## 2024-07-30 NOTE — SPEECH THERAPY NOTE
Speech Language/Pathology    Speech/Language Pathology Progress Note    Patient Name: Meli Nowak  Today's Date: 2024                      SLP RECOMMENDATIONS:         Diet: Level 2 [facility] soft         Liquids: Thin liquids         Medications: as best tolerated         Aspiration Precautions: upright, feeding assist as needed, slow rate              Summary:  SLP attempted trials of advanced textures with pt, however pt's PO intake remains limited at this time. Pt declined all solid food trials. Pt inconsistently responding/attending to SLP. Verbal output limited during session. Pt observed with small sips of thin liquid with no overt s/s aspiration. SLP reviewed general swallow precautions with pt. Will continue current diet at this time. Suspect pt would be able to tolerate a Dental soft diet since alertness has improved. SLP will follow for trials of advanced textures as appropriate.     Assessment:  Limited by participation, no overt s/s aspiration with thin liquids     Plan/Recommendations:  Level 2 [facility] soft/thin liquids   Aspiration precautions  Feeding assist as needed  SLP to follow         Lab Results   Component Value Date    WBC 8.36 2024    HGB 14.1 2024    HCT 46.1 2024    MCV 95 2024     2024           Problem List  Principal Problem:    Schizoaffective disorder, bipolar type (HCC)  Active Problems:    Medical clearance for psychiatric admission    Type 2 diabetes mellitus (HCC)    Obesity    Tobacco abuse    Hyperlipidemia    Esophageal reflux    Essential (primary) hypertension    Vitamin B12 deficiency    Hypoxia       Past Medical History  Past Medical History:   Diagnosis Date    Cognitive impairment     Diabetes mellitus (HCC)     Essential (primary) hypertension     Psychosis (HCC)         Past Surgical History  Past Surgical History:   Procedure Laterality Date     SECTION      CHOLECYSTECTOMY

## 2024-07-30 NOTE — TREATMENT TEAM
07/30/24 0730   Team Meeting   Meeting Type Daily Rounds   Initial Conference Date 07/30/24   Team Members Present   Team Members Present Physician;Nurse;   Physician Team Member Dr. Banuelos, Dr. Armando, Marie MOCTEZUMA   Nursing Team Member Clarissa   Care Management Team Member Mercedes   Patient/Family Present   Patient Present No   Patient's Family Present No     Patient continues with paranoia and Sikhism delusions. Required Zyprexa 5mg IM for agitation. Patient is restless, remains on 3L of oxygen. Clozaril to be increased. Potential d/c on 8/23/24.

## 2024-07-30 NOTE — TREATMENT TEAM
07/29/24 4424   Broset Violence Checklist   Assessment type Shift   Irritability 1   Confusion 1   Boisterousness 1   Threatening physical violence 0   Verbal threats 0   Violence 1   Broset score 4     Pt noted with increased agitation. Continuee to attempt to climb out of bed and agitated on approach and on attempts to redirect. Pt continues with paranoia, Islam delusions and suspicious of medication. Zyprexa 5mg admin IM for agitation. Will monitor for effectiveness.

## 2024-07-30 NOTE — PROGRESS NOTES
"Progress Note - Behavioral Health   Meli Nowak 59 y.o. female MRN: [id number]  Unit/Bed#: [facility] 651-01 Encounter: [id number]    Patient was seen today for continuation of care, records reviewed and patient was discussed with the morning case review team.    Meli was seen today for psychiatric follow-up.  On assessment today, Meli was more responsive to this writer.  Still disorganized and illogical, patient continues to ask for \"help\" intermittently but does not elaborate when prompted.  Clozaril increased to 150 mg nightly on 7/29/2024, Ativan also decreased by 0.5 mg twice daily.  We will continue with weekly labs for drug monitoring and titrate as needed.  No medication change be made at this time.  Tentative discharge ongoing as patient continues to need medication management.    Meli denies acute suicidal/self-harm ideation/intent/plan upon direct inquiry at this time.  Meli remains behaviorally appropriate, no agitation or aggression noted on exam or in report.  Meli also denies HI/AH/VH, and does not appear overtly manic.  No overt delusions or paranoia are verbalized. Impulse control is intact.  Meli remains adherent to her current psychotropic medication regimen and denies any side effects from medications, as well as none noted on exam.    Vitals:  Vitals:    07/30/24 0900   BP:    Pulse:    Resp:    Temp:    SpO2: 95%       Laboratory Results:  I have personally reviewed all pertinent laboratory/tests results.  Most Recent Labs:   Lab Results   Component Value Date    WBC 8.36 07/29/2024    RBC 4.87 07/29/2024    HGB 14.1 07/29/2024    HCT 46.1 07/29/2024     07/29/2024    RDW 15.7 (H) 07/29/2024    NEUTROABS 5.95 07/29/2024    SODIUM 141 07/29/2024    K 4.1 07/29/2024    CL 99 07/29/2024    CO2 33 (H) 07/29/2024    BUN 11 07/29/2024    CREATININE 0.83 07/29/2024    GLUC 114 07/29/2024    GLUF 114 (H) 07/29/2024    CALCIUM 9.4 07/29/2024    AST 26 07/24/2024    ALT 28 07/24/2024    ALKPHOS 77 " 07/24/2024    TP 5.8 (L) 07/24/2024    ALB 3.2 (L) 07/24/2024    TBILI 0.63 07/24/2024    AMMONIA 32 07/03/2024    NOY4EBSXUSLE 2.000 07/24/2024    HGBA1C 6.4 (H) 05/03/2024     05/03/2024     Last Laboratory Results with Depakote and/or Tegretol levels:   Lab Results   Component Value Date    WBC 8.36 07/29/2024    RBC 4.87 07/29/2024    HGB 14.1 07/29/2024    HCT 46.1 07/29/2024     07/29/2024    RDW 15.7 (H) 07/29/2024    NEUTROABS 5.95 07/29/2024    SODIUM 141 07/29/2024    K 4.1 07/29/2024    CL 99 07/29/2024    CO2 33 (H) 07/29/2024    BUN 11 07/29/2024    CREATININE 0.83 07/29/2024    GLUC 114 07/29/2024    GLUF 114 (H) 07/29/2024    CALCIUM 9.4 07/29/2024    AST 26 07/24/2024    ALT 28 07/24/2024    ALKPHOS 77 07/24/2024    TP 5.8 (L) 07/24/2024    ALB 3.2 (L) 07/24/2024    TBILI 0.63 07/24/2024     CBC:   Lab Results   Component Value Date    WBC 8.36 07/29/2024    RBC 4.87 07/29/2024    HGB 14.1 07/29/2024    HCT 46.1 07/29/2024    MCV 95 07/29/2024     07/29/2024    MCH 29.0 07/29/2024    MCHC 30.6 (L) 07/29/2024    RDW 15.7 (H) 07/29/2024    MPV 9.3 07/29/2024    NRBC 0 07/29/2024    NEUTROABS 5.95 07/29/2024       Psychiatric Review of Systems:  Behavior over the last 24 hours:  unchanged.   Sleep: better  Appetite: needs improvement  Medication side effects: none  ROS: no complaints, denies shortness of breath or chest pain and all other systems are negative for acute changes    Mental Status Evaluation:  Appearance:  marginal hygiene, overweight   Behavior:  bizarre, less agitated   Speech:  slow, delayed   Mood:  less irritable   Affect:  constricted, inappropriate smile   Thought Process:  disorganized, illogical   Thought Content:  mild paranoia   Perceptual Disturbances: appears responding to internal stimuli, appears preoccupied, talks to self at times   Risk Potential: Suicidal ideation - None  Homicidal ideation - None  Potential for aggression - No   Memory:  recent and  remote memory: unable to assess due to lack of cooperation   Sensorium  person      Consciousness:  alert and awake   Attention: decreased concentration and decreased attention span   Insight:  limited   Judgment: limited   Gait/Station: needs assistive device   Motor Activity: no abnormal movements   Progress Toward Goals:   Meli is progressing towards goals of inpatient psychiatric treatment by continued medication compliance and is attending therapeutic modalities on the milieu. However, the patient continues to require inpatient psychiatric hospitalization for continued medication management and titration to optimize symptom reduction, improve sleep hygiene, and demonstrate adequate self-care.    Assessment & Plan   Principal Problem:    Schizoaffective disorder, bipolar type (HCC)  Active Problems:    Medical clearance for psychiatric admission    Type 2 diabetes mellitus (HCC)    Obesity    Tobacco abuse    Hyperlipidemia    Esophageal reflux    Essential (primary) hypertension    Vitamin B12 deficiency    Hypoxia      Recommended Treatment: Treatment plan and medication changes discussed and per the attending physician the plan is:    1.Continue with group therapy, milieu therapy and occupational therapy  2.Behavioral Health checks every 7 minutes  3.Continue frequent safety checks and vitals per unit protocol  4.Continue with SLIM medical management as indicated  5.Continue with current medication regimen  6.Will review labs in the a.m.  7.Disposition Planning: Discharge planning and efforts remain ongoing    Behavioral Health Medications: all current active meds have been reviewed and continue current psychiatric medications.  Current Facility-Administered Medications   Medication Dose Route Frequency Provider Last Rate    acetaminophen  650 mg Oral Q4H PRN JOSE ELIAS Figueroa      acetaminophen  650 mg Oral Q4H PRN JOSE ELIAS Figueroa      acetaminophen  975 mg Oral Q6H PRN JOSE ELIAS Figueroa       atorvastatin  10 mg Oral Daily Marie Ziegler, CRNP      bisacodyl  10 mg Rectal Daily PRN Marie Ziegler, CRNP      carvedilol  6.25 mg Oral BID With Meals Marie Ziegler, CRNP      cloNIDine  0.1 mg Transdermal Weekly Marie Ziegler, CRNP      cloZAPine  150 mg Oral HS Marie Ziegler, CRNP      cyanocobalamin  1,000 mcg Oral Daily Marie Ziegler, CRNP      famotidine  20 mg Oral BID Shan Fitzgerald, DO      FLUoxetine  20 mg Oral Daily Shan Fitzgerald, DO      fluticasone  1 puff Inhalation Daily Marie Ziegler, CRNP      hydrOXYzine HCL  25 mg Oral Q6H PRN Max 4/day Marie Ziegler, CRNP      hydrOXYzine HCL  50 mg Oral Q6H PRN Max 4/day Marie Ziegler, CRNP      insulin lispro  1-5 Units Subcutaneous 4x Daily (AC & HS) Marie Ziegler, CRNP      ipratropium-albuterol  3 mL Nebulization Q4H PRN Darin Lawton MD      LORazepam  1 mg Intramuscular Q6H PRN Max 3/day Marie Ziegler, CRNP      LORazepam  1 mg Oral BID Dereje Banuelos MD      Or    LORazepam  1 mg Intramuscular BID Dereje Banuelos MD      LORazepam  1 mg Oral Q6H PRN Max 3/day Marie Ziegler, CRNP      melatonin  3 mg Oral HS Marie Ziegler, CRNP      Multivitamin  15 mL Oral Daily Marie Ziegler, CRNP      nicotine  1 patch Transdermal Daily Marie Ziegler, CRNP      OLANZapine  5 mg Intramuscular Q3H PRN Max 3/day Marie Ziegler, CRNP      ondansetron  4 mg Oral Q6H PRN Darin Lawton MD      polyethylene glycol  17 g Oral Daily PRN Marie Ziegler, CRNP      polyethylene glycol  17 g Oral Daily Kristen Logan, CRNP      QUEtiapine  100 mg Oral Q3H PRN Max 3/day Marie Ziegler, CRNP      QUEtiapine  25 mg Oral Q6H PRN Max 4/day Marie Ziegler, CRNP      QUEtiapine  50 mg Oral Q6H PRN Max 4/day Marie Ziegler, CRNP      senna-docusate sodium  1 tablet Oral HS Marie Ziegler, CRNP      traZODone  50 mg Oral HS PRN Marie Ziegler, CHRISTOPHERNP         Risks / Benefits of Treatment:  Risks, benefits, and possible side effects of  medications explained to patient and patient verbalizes understanding and agreement for treatment.    Counseling / Coordination of Care:  Patient's progress reviewed with nursing staff.  Medications, treatment progress and treatment plan reviewed with patient.  Supportive counseling provided to the patient.    Total floor/unit time spent today 25 minutes. Greater than 50% of total time was spent with the patient and / or family counseling and / or coordination of care. A description of the counseling / coordination of care: medication education, treatment plan, supportive therapy.    This note was completed in part utilizing Dragon dictation Software. Grammatical, translation, syntax errors, random word insertions, spelling mistakes, and incomplete sentences may be an occasional consequence of this system secondary to software limitations with voice recognition, ambient noise, and hardware issues. If you have any questions or concerns about the content, text, or information contained within the body of this dictation, please contact the provider for clarification

## 2024-07-30 NOTE — NURSING NOTE
"Patient remains withdrawn to her room with 1:1 staff at her bedside. +RIS and is internally preoccupied. She is paranoid and suspicious of staff, food, and medications. Patient put food into her mouth at dinner and then spit it back out. She does the same with medications. Patient is bizarre with disorganized thought content. She presents as anxious and fearful and is unable to concentrate to answer psych assessment questions. Patient required IM Ativan for refusal of 2100 dose per meds over objection order. Patient refused all HS medications, including Clozaril for the second night in a row. Patient stated \"Please don't. I don't want to overdose. I have had enough.\" Patient unable to be persuaded to take her medications as attempted by multiple staff members.   "

## 2024-07-30 NOTE — NURSING NOTE
"Patient refused dinner and HS medications. She remains paranoid and suspicious stating \"I can't take it. It's not Clozaril. Clozaril comes in a tube. It's beyond my control. When is she coming back?\" IM Ativan 1 mg administered at HS per meds over objection order.    "

## 2024-07-30 NOTE — PLAN OF CARE
Problem: Alteration in Thoughts and Perception  Goal: Treatment Goal: Gain control of psychotic behaviors/thinking, reduce/eliminate presenting symptoms and demonstrate improved reality functioning upon discharge  Outcome: Progressing  Goal: Verbalize thoughts and feelings  Description: Interventions:  - Promote a nonjudgmental and trusting relationship with the patient through active listening and therapeutic communication  - Assess patient's level of functioning, behavior and potential for risk  - Engage patient in 1 on 1 interactions  - Encourage patient to express fears, feelings, frustrations, and discuss symptoms    - [place] patient to reality, help patient recognize reality-based thinking   - Administer medications as ordered and assess for potential side effects  - Provide the patient education related to the signs and symptoms of the illness and desired effects of prescribed medications  Outcome: Progressing  Goal: Refrain from acting on delusional thinking/internal stimuli  Description: Interventions:  - Monitor patient closely, per order   - Utilize least restrictive measures   - Set reasonable limits, give positive feedback for acceptable   - Administer medications as ordered and monitor of potential side effects  Outcome: Progressing  Goal: Agree to be compliant with medication regime, as prescribed and report medication side effects  Description: Interventions:  - Offer appropriate PRN medication and supervise ingestion; conduct AIMS, as needed   Outcome: Progressing  Goal: Attend and participate in unit activities, including therapeutic, recreational, and educational groups  Description: Interventions:  -Encourage Visitation and family involvement in care  Outcome: Progressing  Goal: Recognize dysfunctional thoughts, communicate reality-based thoughts at the time of discharge  Description: Interventions:  - Provide medication and psycho-education to assist patient in compliance and developing  insight into his/her illness   Outcome: Progressing  Goal: Complete daily ADLs, including personal hygiene independently, as able  Description: Interventions:  - Observe, teach, and assist patient with ADLS  - Monitor and promote a balance of rest/activity, with adequate nutrition and elimination   Outcome: Progressing     Problem: Risk for Self Injury/Neglect  Goal: Treatment Goal: Remain safe during length of stay, learn and adopt new coping skills, and be free of self-injurious ideation, impulses and acts at the time of discharge  Outcome: Progressing  Goal: Verbalize thoughts and feelings  Description: Interventions:  - Assess and re-assess patient's lethality and potential for self-injury  - Engage patient in 1:1 interactions, daily, for a minimum of 15 minutes  - Encourage patient to express feelings, fears, frustrations, hopes  - Establish rapport/trust with patient   Outcome: Progressing  Goal: Refrain from harming self  Description: Interventions:  - Monitor patient closely, per order  - Develop a trusting relationship  - Supervise medication ingestion, monitor effects and side effects   Outcome: Progressing  Goal: Attend and participate in unit activities, including therapeutic, recreational, and educational groups  Description: Interventions:  - Provide therapeutic and educational activities daily, encourage attendance and participation, and document same in the medical record  - Obtain collateral information, encourage visitation and family involvement in care   Outcome: Progressing  Goal: Recognize maladaptive responses and adopt new coping mechanisms  Outcome: Progressing  Goal: Complete daily ADLs, including personal hygiene independently, as able  Description: Interventions:  - Observe, teach, and assist patient with ADLS  - Monitor and promote a balance of rest/activity, with adequate nutrition and elimination  Outcome: Progressing     Problem: Anxiety  Goal: Anxiety is at manageable  level  Description: Interventions:  - Assess and monitor patient's anxiety level.   - Monitor for signs and symptoms (heart palpitations, chest pain, shortness of breath, headaches, nausea, feeling jumpy, restlessness, irritable, apprehensive).   - Collaborate with interdisciplinary team and initiate plan and interventions as ordered.  - [place] patient to unit/surroundings  - Explain treatment plan  - Encourage participation in care  - Encourage verbalization of concerns/fears  - Identify coping mechanisms  - Assist in developing anxiety-reducing skills  - Administer/offer alternative therapies  - Limit or eliminate stimulants  Outcome: Progressing     Problem: Potential for Falls  Goal: Patient will remain free of falls  Description: INTERVENTIONS:  - Educate patient on patient safety including physical limitations  - Instruct patient to call for assistance with activity   - Consult OT/PT to assist with strengthening/mobility   - Keep Call bell within reach  - Keep bed low and locked with side rails adjusted as appropriate  - Keep care items and personal belongings within reach  - Initiate and maintain comfort rounds  - Offer Toileting every 2 Hours, in advance of need  - Initiate/Maintain bed and chair alarm  - Obtain necessary fall risk management equipment: walker, wheelchair  - Apply yellow socks and bracelet for high fall risk patients  - Patient moved to room near nurses station  Outcome: Progressing

## 2024-07-31 LAB
ANION GAP SERPL CALCULATED.3IONS-SCNC: 11 MMOL/L (ref 4–13)
BUN SERPL-MCNC: 14 MG/DL (ref 5–25)
CALCIUM SERPL-MCNC: 9.4 MG/DL (ref 8.4–10.2)
CHLORIDE SERPL-SCNC: 101 MMOL/L (ref 96–108)
CO2 SERPL-SCNC: 30 MMOL/L (ref 21–32)
CREAT SERPL-MCNC: 0.87 MG/DL (ref 0.6–1.3)
GFR SERPL CREATININE-BSD FRML MDRD: 74 ML/MIN/1.73SQ M
GLUCOSE P FAST SERPL-MCNC: 97 MG/DL (ref 65–99)
GLUCOSE SERPL-MCNC: 115 MG/DL (ref 65–140)
GLUCOSE SERPL-MCNC: 156 MG/DL (ref 65–140)
GLUCOSE SERPL-MCNC: 84 MG/DL (ref 65–140)
GLUCOSE SERPL-MCNC: 92 MG/DL (ref 65–140)
GLUCOSE SERPL-MCNC: 96 MG/DL (ref 65–140)
GLUCOSE SERPL-MCNC: 97 MG/DL (ref 65–140)
MAGNESIUM SERPL-MCNC: 2.3 MG/DL (ref 1.9–2.7)
PHOSPHATE SERPL-MCNC: 4.2 MG/DL (ref 2.7–4.5)
POTASSIUM SERPL-SCNC: 4 MMOL/L (ref 3.5–5.3)
SODIUM SERPL-SCNC: 142 MMOL/L (ref 135–147)

## 2024-07-31 PROCEDURE — 97116 GAIT TRAINING THERAPY: CPT

## 2024-07-31 PROCEDURE — 82948 REAGENT STRIP/BLOOD GLUCOSE: CPT

## 2024-07-31 PROCEDURE — 84100 ASSAY OF PHOSPHORUS: CPT | Performed by: NURSE PRACTITIONER

## 2024-07-31 PROCEDURE — 83735 ASSAY OF MAGNESIUM: CPT | Performed by: NURSE PRACTITIONER

## 2024-07-31 PROCEDURE — 99232 SBSQ HOSP IP/OBS MODERATE 35: CPT | Performed by: STUDENT IN AN ORGANIZED HEALTH CARE EDUCATION/TRAINING PROGRAM

## 2024-07-31 PROCEDURE — 80048 BASIC METABOLIC PNL TOTAL CA: CPT | Performed by: NURSE PRACTITIONER

## 2024-07-31 RX ORDER — WATER 10 ML/10ML
INJECTION INTRAMUSCULAR; INTRAVENOUS; SUBCUTANEOUS
Status: COMPLETED
Start: 2024-07-31 | End: 2024-07-31

## 2024-07-31 RX ORDER — FLUOXETINE 20 MG/5ML
20 SOLUTION ORAL DAILY
Status: DISCONTINUED | OUTPATIENT
Start: 2024-08-01 | End: 2024-08-05

## 2024-07-31 RX ADMIN — FAMOTIDINE 20 MG: 20 TABLET ORAL at 17:18

## 2024-07-31 RX ADMIN — SENNOSIDES AND DOCUSATE SODIUM 1 TABLET: 50; 8.6 TABLET ORAL at 22:14

## 2024-07-31 RX ADMIN — CARVEDILOL 6.25 MG: 6.25 TABLET, FILM COATED ORAL at 16:11

## 2024-07-31 RX ADMIN — CLOZAPINE 125 MG: 100 TABLET ORAL at 09:36

## 2024-07-31 RX ADMIN — LORAZEPAM 1 MG: 1 TABLET ORAL at 22:14

## 2024-07-31 RX ADMIN — LORAZEPAM 1 MG: 1 TABLET ORAL at 09:05

## 2024-07-31 RX ADMIN — WATER 2.1 ML: 1 INJECTION, SOLUTION INTRAMUSCULAR; INTRAVENOUS; SUBCUTANEOUS at 01:49

## 2024-07-31 RX ADMIN — OLANZAPINE 5 MG: 10 INJECTION, POWDER, FOR SOLUTION INTRAMUSCULAR at 01:58

## 2024-07-31 RX ADMIN — INSULIN LISPRO 1 UNITS: 100 INJECTION, SOLUTION INTRAVENOUS; SUBCUTANEOUS at 11:49

## 2024-07-31 RX ADMIN — FLUOXETINE 20 MG: 20 CAPSULE ORAL at 09:09

## 2024-07-31 NOTE — PLAN OF CARE
Problem: PHYSICAL THERAPY ADULT  Goal: Performs mobility at highest level of function for planned discharge setting.  See evaluation for individualized goals.  Description: Treatment/Interventions: ADL retraining, Functional transfer training, LE strengthening/ROM, Elevations, Therapeutic exercise, Endurance training, Patient/family training, Equipment eval/education, Bed mobility, Gait training, Spoke to nursing, Spoke to case management, OT  Equipment Recommended: Walker       See flowsheet documentation for full assessment, interventions and recommendations.  Outcome: Progressing  Note: Prognosis: Fair  Problem List: Decreased strength, Decreased range of motion, Decreased endurance, Impaired balance, Decreased mobility, Decreased coordination, Decreased cognition, Impaired judgement, Decreased safety awareness, Impaired sensation, Obesity  Assessment: Pt is demosntrating significant improvement with transfesr and ambulation. Able to use RW with VC and tolerate longer distance ambulation. balance continues to be poor and safety awareness is very limited. Continue to recommend post acute rehab at D/C.  Barriers to Discharge: Inaccessible home environment, Decreased caregiver support     Rehab Resource Intensity Level, PT: II (Moderate Resource Intensity)    See flowsheet documentation for full assessment.

## 2024-07-31 NOTE — PHYSICAL THERAPY NOTE
Physical TherapyTreatment Note    Patient's Name: Meli Nowak    Admitting Diagnosis  Major depressive disorder, single episode, unspecified [F32.9]  Schizoaffective disorder (HCC) [F25.9]    Problem List  Patient Active Problem List   Diagnosis    Medical clearance for psychiatric admission    Type 2 diabetes mellitus (HCC)    Obesity    Psychosis (HCC)    Tobacco abuse    Hyperlipidemia    Schizoaffective disorder, bipolar type (HCC)    Altered mental status    Agoraphobia with panic disorder    Arthritis    Diastasis recti    Endometrial cancer (HCC)    Esophageal reflux    Hepatic steatosis    HSV-2 infection    Incisional hernia, without obstruction or gangrene    Incontinence of urine in female    Polycystic ovaries    Postmenopausal bleeding    Posttraumatic stress disorder    Right buttock pain    Catatonia    Essential (primary) hypertension    Vitamin B12 deficiency    Hypoxia       Past Medical History  Past Medical History:   Diagnosis Date    Cognitive impairment     Diabetes mellitus (HCC)     Essential (primary) hypertension     Psychosis (HCC)        Past Surgical History  Past Surgical History:   Procedure Laterality Date     SECTION      CHOLECYSTECTOMY         Recent Imaging  XR chest portable   Final Result by Quincy Farooq MD ( [id number])      No focal consolidation, pleural effusion, or pneumothorax.      Resident: Jose Luis Couch I, the attending radiologist, have reviewed the images and agree with the final report above.      Workstation performed: [id number]             Recent Vital Signs  Vitals:    24 0749 24 0900 24 2049 24 0017   BP: 129/76  139/82    BP Location: Right arm  Right arm    Pulse: 94  95    Resp: 20  20    Temp: 98.8 °F (37.1 °C)  (!) 97.2 °F (36.2 °C)    TempSrc: Temporal  Temporal    SpO2: 94% 95% 95%    Weight:    96.2 kg (212 lb 1.3 oz)   Height:            24 1040   PT Last Visit   PT Visit Date 24   Note Type    Note Type Treatment   Pain Assessment   Pain Assessment Tool 0-10   Pain Score No Pain   Restrictions/Precautions   Weight Bearing Precautions Per Order No   Other Precautions Chair Alarm;Bed Alarm;1:1;Cognitive;Fall Risk   General   Chart Reviewed Yes   Response to Previous Treatment Patient with no complaints from previous session.   Family/Caregiver Present No   Cognition   Overall Cognitive Status Impaired   Arousal/Participation Alert   Attention Attends with cues to redirect   Orientation Level Oriented to person;Oriented to place   Memory Decreased recall of recent events;Decreased recall of precautions   Following Commands Unable to follow one step commands   Transfers   Sit to Stand 4  Minimal assistance   Additional items Assist x 1;Armrests;Increased time required;Verbal cues   Stand to Sit 4  Minimal assistance   Additional items Assist x 1;Armrests;Increased time required;Verbal cues   Ambulation/Elevation   Gait pattern Step through pattern;Decreased toe off;Decreased heel strike;Excessively slow;Short stride;Decreased foot clearance;Improper Weight shift   Gait Assistance 4  Minimal assist   Additional items Assist x 1;Verbal cues;Tactile cues   Assistive Device Rolling walker   Distance 65ft x 4   Balance   Static Sitting Fair +   Dynamic Sitting Fair +   Static Standing Poor +   Dynamic Standing Poor +   Ambulatory Poor +   Endurance Deficit   Endurance Deficit Yes   Endurance Deficit Description reduced from baseline   Activity Tolerance   Activity Tolerance Patient limited by fatigue   Medical Staff Made Aware spoke to CM   Nurse Made Aware spoke to RN   Assessment   Prognosis Fair   Problem List Decreased strength;Decreased range of motion;Decreased endurance;Impaired balance;Decreased mobility;Decreased coordination;Decreased cognition;Impaired judgement;Decreased safety awareness;Impaired sensation;Obesity   Assessment Pt is demosntrating significant improvement with transfesr and ambulation.  Able to use RW with VC and tolerate longer distance ambulation. balance continues to be poor and safety awareness is very limited. Continue to recommend post acute rehab at D/C.   Barriers to Discharge Inaccessible home environment;Decreased caregiver support   Goals   Patient Goals to get up and walk   STG Expiration Date 08/03/24   Short Term Goal #1 see eval note   PT Treatment Day 1   Plan   Treatment/Interventions ADL retraining;Functional transfer training;LE strengthening/ROM;Elevations;Therapeutic exercise;Endurance training;Patient/family training;Equipment eval/education;Bed mobility;Gait training;Spoke to nursing;Spoke to case management;OT   Progress Progressing toward goals   PT Frequency 1-2x/wk   Discharge Recommendation   Rehab Resource Intensity Level, PT II (Moderate Resource Intensity)   Equipment Recommended Walker   Walker Package Recommended Wheeled walker   AM-PAC Basic Mobility Inpatient   Turning in Flat Bed Without Bedrails 3   Lying on Back to Sitting on Edge of Flat Bed Without Bedrails 3   Moving Bed to Chair 3   Standing Up From Chair Using Arms 3   Walk in Room 2   Climb 3-5 Stairs With Railing 2   Basic Mobility Inpatient Raw Score 16   Basic Mobility Standardized Score 38.32   [facility] Highest Level Of Mobility   -HLM Goal 5: Stand one or more mins   -HLM Achieved 7: Walk 25 feet or more   Education   Education Provided Mobility training;Home exercise program;Assistive device   Patient Reinforcement needed   End of Consult   Patient Position at End of Consult Bedside chair;All needs within reach         Murray Crawford PT, DPT

## 2024-07-31 NOTE — PLAN OF CARE
Problem: DISCHARGE PLANNING - CARE MANAGEMENT  Goal: Discharge to post-acute care or home with appropriate resources  Description: INTERVENTIONS:  - Conduct assessment to determine patient/family and health care team treatment goals, and need for post-acute services based on payer coverage, community resources, and patient preferences, and barriers to discharge  - Address psychosocial, clinical, and financial barriers to discharge as identified in assessment in conjunction with the patient/family and health care team  - Arrange appropriate level of post-acute services according to patient’s   needs and preference and payer coverage in collaboration with the physician and health care team  - Communicate with and update the patient/family, physician, and health care team regarding progress on the discharge plan  - Arrange appropriate transportation to post-acute venues  Outcome: Progressing     Problem: Prexisting or High Potential for Compromised Skin Integrity  Goal: Skin integrity is maintained or improved  Description: INTERVENTIONS:  - Identify patients at risk for skin breakdown  - Assess and monitor skin integrity  - Assess and monitor nutrition and hydration status  - Monitor labs   - Assess for incontinence   - Turn and reposition patient  - Assist with mobility/ambulation  - Relieve pressure over bony prominences  - Avoid friction and shearing  - Provide appropriate hygiene as needed including keeping skin clean and dry  - Evaluate need for skin moisturizer/barrier cream  - Collaborate with interdisciplinary team   - Patient/family teaching  - Consider wound care consult   Outcome: Progressing     Problem: Alteration in Thoughts and Perception  Goal: Treatment Goal: Gain control of psychotic behaviors/thinking, reduce/eliminate presenting symptoms and demonstrate improved reality functioning upon discharge  Outcome: Progressing  Goal: Verbalize thoughts and feelings  Description: Interventions:  - Promote  a nonjudgmental and trusting relationship with the patient through active listening and therapeutic communication  - Assess patient's level of functioning, behavior and potential for risk  - Engage patient in 1 on 1 interactions  - Encourage patient to express fears, feelings, frustrations, and discuss symptoms    - [place] patient to reality, help patient recognize reality-based thinking   - Administer medications as ordered and assess for potential side effects  - Provide the patient education related to the signs and symptoms of the illness and desired effects of prescribed medications  Outcome: Progressing  Goal: Refrain from acting on delusional thinking/internal stimuli  Description: Interventions:  - Monitor patient closely, per order   - Utilize least restrictive measures   - Set reasonable limits, give positive feedback for acceptable   - Administer medications as ordered and monitor of potential side effects  Outcome: Progressing  Goal: Agree to be compliant with medication regime, as prescribed and report medication side effects  Description: Interventions:  - Offer appropriate PRN medication and supervise ingestion; conduct AIMS, as needed   Outcome: Progressing  Goal: Attend and participate in unit activities, including therapeutic, recreational, and educational groups  Description: Interventions:  -Encourage Visitation and family involvement in care  Outcome: Progressing  Goal: Recognize dysfunctional thoughts, communicate reality-based thoughts at the time of discharge  Description: Interventions:  - Provide medication and psycho-education to assist patient in compliance and developing insight into his/her illness   Outcome: Progressing  Goal: Complete daily ADLs, including personal hygiene independently, as able  Description: Interventions:  - Observe, teach, and assist patient with ADLS  - Monitor and promote a balance of rest/activity, with adequate nutrition and elimination   Outcome: Progressing      Problem: Ineffective Coping  Goal: Identifies ineffective coping skills  Outcome: Progressing  Goal: Identifies healthy coping skills  Outcome: Progressing  Goal: Demonstrates healthy coping skills  Outcome: Progressing  Goal: Participates in unit activities  Description: Interventions:  - Provide therapeutic environment   - Provide required programming   - Redirect inappropriate behaviors   Outcome: Progressing  Goal: Patient/Family participate in treatment and DC plans  Description: Interventions:  - Provide therapeutic environment  Outcome: Progressing  Goal: Patient/Family verbalizes awareness of resources  Outcome: Progressing  Goal: Understands least restrictive measures  Description: Interventions:  - Utilize least restrictive behavior  Outcome: Progressing  Goal: Free from restraint events  Description: - Utilize least restrictive measures   - Provide behavioral interventions   - Redirect inappropriate behaviors   Outcome: Progressing     Problem: Risk for Self Injury/Neglect  Goal: Treatment Goal: Remain safe during length of stay, learn and adopt new coping skills, and be free of self-injurious ideation, impulses and acts at the time of discharge  Outcome: Progressing  Goal: Verbalize thoughts and feelings  Description: Interventions:  - Assess and re-assess patient's lethality and potential for self-injury  - Engage patient in 1:1 interactions, daily, for a minimum of 15 minutes  - Encourage patient to express feelings, fears, frustrations, hopes  - Establish rapport/trust with patient   Outcome: Progressing  Goal: Refrain from harming self  Description: Interventions:  - Monitor patient closely, per order  - Develop a trusting relationship  - Supervise medication ingestion, monitor effects and side effects   Outcome: Progressing  Goal: Attend and participate in unit activities, including therapeutic, recreational, and educational groups  Description: Interventions:  - Provide therapeutic and  educational activities daily, encourage attendance and participation, and document same in the medical record  - Obtain collateral information, encourage visitation and family involvement in care   Outcome: Progressing  Goal: Recognize maladaptive responses and adopt new coping mechanisms  Outcome: Progressing  Goal: Complete daily ADLs, including personal hygiene independently, as able  Description: Interventions:  - Observe, teach, and assist patient with ADLS  - Monitor and promote a balance of rest/activity, with adequate nutrition and elimination  Outcome: Progressing     Problem: Depression  Goal: Treatment Goal: Demonstrate behavioral control of depressive symptoms, verbalize feelings of improved mood/affect, and adopt new coping skills prior to discharge  Outcome: Progressing  Goal: Verbalize thoughts and feelings  Description: Interventions:  - Assess and re-assess patient's level of risk   - Engage patient in 1:1 interactions, daily, for a minimum of 15 minutes   - Encourage patient to express feelings, fears, frustrations, hopes   Outcome: Progressing  Goal: Refrain from harming self  Description: Interventions:  - Monitor patient closely, per order   - Supervise medication ingestion, monitor effects and side effects   Outcome: Progressing  Goal: Refrain from isolation  Description: Interventions:  - Develop a trusting relationship   - Encourage socialization   Outcome: Progressing  Goal: Refrain from self-neglect  Outcome: Progressing  Goal: Attend and participate in unit activities, including therapeutic, recreational, and educational groups  Description: Interventions:  - Provide therapeutic and educational activities daily, encourage attendance and participation, and document same in the medical record   Outcome: Progressing  Goal: Complete daily ADLs, including personal hygiene independently, as able  Description: Interventions:  - Observe, teach, and assist patient with ADLS  -  Monitor and promote  a balance of rest/activity, with adequate nutrition and elimination   Outcome: Progressing     Problem: Anxiety  Goal: Anxiety is at manageable level  Description: Interventions:  - Assess and monitor patient's anxiety level.   - Monitor for signs and symptoms (heart palpitations, chest pain, shortness of breath, headaches, nausea, feeling jumpy, restlessness, irritable, apprehensive).   - Collaborate with interdisciplinary team and initiate plan and interventions as ordered.  - [place] patient to unit/surroundings  - Explain treatment plan  - Encourage participation in care  - Encourage verbalization of concerns/fears  - Identify coping mechanisms  - Assist in developing anxiety-reducing skills  - Administer/offer alternative therapies  - Limit or eliminate stimulants  Outcome: Progressing     Problem: SAFETY,RESTRAINT: NV/NON-SELF DESTRUCTIVE BEHAVIOR  Goal: Remains free of harm/injury (restraint for non violent/non self-detsructive behavior)  Description: INTERVENTIONS:  - Instruct patient/family regarding restraint use   - Assess and monitor physiologic and psychological status   - Provide interventions and comfort measures to meet assessed patient needs   - Identify and implement measures to help patient regain control  - Assess readiness for release of restraint   Outcome: Progressing  Goal: Returns to optimal restraint-free functioning  Description: INTERVENTIONS:  - Assess the patient's behavior and symptoms that indicate continued need for restraint  - Identify and implement measures to help patient regain control  - Assess readiness for release of restraint   Outcome: Progressing     Problem: Potential for Falls  Goal: Patient will remain free of falls  Description: INTERVENTIONS:  - Educate patient on patient safety including physical limitations  - Instruct patient to call for assistance with activity   - Consult OT/PT to assist with strengthening/mobility   - Keep Call bell within reach  - Keep bed low  and locked with side rails adjusted as appropriate  - Keep care items and personal belongings within reach  - Initiate and maintain comfort rounds  - Offer Toileting every 2 Hours, in advance of need  - Initiate/Maintain bed and chair alarm  - Obtain necessary fall risk management equipment: walker, wheelchair  - Apply yellow socks and bracelet for high fall risk patients  - Patient moved to room near nurses station  Outcome: Progressing     Problem: Nutrition/Hydration-ADULT  Goal: Nutrient/Hydration intake appropriate for improving, restoring or maintaining nutritional needs  Description: Monitor and assess patient's nutrition/hydration status for malnutrition. Collaborate with interdisciplinary team and initiate plan and interventions as ordered.  Monitor patient's weight and dietary intake as ordered or per policy. Utilize nutrition screening tool and intervene as necessary. Determine patient's food preferences and provide high-protein, high-caloric foods as appropriate.     INTERVENTIONS:  - Monitor oral intake, urinary output, labs, and treatment plans  - Assess nutrition and hydration status and recommend course of action  - Evaluate amount of meals eaten  - Assist patient with eating if necessary   - Allow adequate time for meals  - Recommend/ encourage appropriate diets, oral nutritional supplements, and vitamin/mineral supplements  - Order, calculate, and assess calorie counts as needed  - Recommend, monitor, and adjust tube feedings and TPN/PPN based on assessed needs  - Assess need for intravenous fluids  - Provide specific nutrition/hydration education as appropriate  - Include patient/family/caregiver in decisions related to nutrition  Outcome: Progressing

## 2024-07-31 NOTE — TREATMENT TEAM
07/31/24 0742   Team Meeting   Meeting Type Daily Rounds   Initial Conference Date 07/31/24   Team Members Present   Team Members Present Physician;Nurse;   Physician Team Member Dr. Banuelos, Dr. Armando, Marie MOCTEZUMA   Nursing Team Member Clarissa   Care Management Team Member Mercedes   Patient/Family Present   Patient Present No   Patient's Family Present No     Patient continues with paranoia and Oriental orthodox delusions. Patient is restless, remains on 3L of oxygen. Required IM Zyprexa for agitation. Clozaril to be slowly increased. Ativan to be switched to Klonopin. Potential d/c on 8/23/24.

## 2024-07-31 NOTE — TREATMENT TEAM
"   07/31/24 0153   Broset Violence Checklist   Assessment type Shift   Irritability 1   Confusion 1   Boisterousness 1   Threatening physical violence 0   Verbal threats 0   Violence 0   Broset score 3   Agitated Behavior Scale   Short Attention Span, Easy Distractibility, Inability to Concentrate 4   Impulsive, Impatient, Low Tolerance for Pain or Frustration 4   Uncooperative, Resistant to Care, Demanding 4   Violent and/or Threatening Violence Toward People or Property 1   Explosive and/or Unpredictable Anger 1   Rocking, Rubbing, Moaning, Other Self-Stimulating Behavior 4   Pulling at Tubes, Restraints, etc. 1   Wandering from Treatment Area 1   Restlessness, Pacing, Excessive Movement 4   Repetitive Behaviors, Motor and/or Verbal 4   Rapid, Loud or Excessive Talking 4   Sudden Changes of Mood 4   Easily Initiated - Excessive Crying and/or Laughter 1   Self-Abusiveness, Physical and/or Verbal 1   Agitated Behavior Scale Total Score  38     Patient is agitated, attempting to get out or bed, yelling stating \"I need to get out of here\". Patient is confused and responding to internal stimuli un able to be redirected. PRN Zyprexa 5 mg given IM.  "

## 2024-07-31 NOTE — PROGRESS NOTES
"Progress Note - Behavioral Health   Meli Nowak 59 y.o. female MRN: [id number]  Unit/Bed#: [facility] 651-01 Encounter: [id number]    Patient was seen today for continuation of care, records reviewed and patient was discussed with the morning case review team.    Meli was seen today for psychiatric follow-up.  On assessment today, Meli was more aware of her surroundings.  Alert primarily to self, thought processes still disorganized and illogical with internal preoccupation noted on exam.  Cheondoism statements still made such as  \" God will save me\", patient educated but limited understanding noted.  Currently seen in the day room, patient offered Clozaril 125 mg due to refusal of nighttime dose on 7/30 and 7/29.  Medication accepted, will also implement mouth checks to ensure compliance.  Clozaril also decreased from 150 mg to 125 mg qhs due to recent noncompliance, we will obtain Clozaril level on Monday, 8/5/2024 in addition to weekly labs and EKG.  We will still implemented due to intermittent compliance, one-to-one remains at the bedside for safety.  Tentative discharge ongoing as patient continues to undergo medication management.    Meli denies acute suicidal/self-harm ideation/intent/plan upon direct inquiry at this time.  Meli remains behaviorally appropriate, no agitation or aggression noted on exam or in report.  Meli also denies HI/AH/VH, and does not appear overtly manic.  Impulse control is intact.  Meli remains adherent to her current psychotropic medication regimen and denies any side effects from medications, as well as none noted on exam.    Vitals:  Vitals:    07/30/24 2049   BP: 139/82   Pulse: 95   Resp: 20   Temp: (!) 97.2 °F (36.2 °C)   SpO2: 95%       Laboratory Results:  I have personally reviewed all pertinent laboratory/tests results.  Labs in last 72 hours:   Recent Labs     07/29/24  0933 07/31/24  0431   WBC 8.36  --    RBC 4.87  --    HGB 14.1  --    HCT 46.1  --      --    RDW " 15.7*  --    NEUTROABS 5.95  --    SODIUM  --  142   K  --  4.0   CL  --  101   CO2  --  30   BUN  --  14   CREATININE  --  0.87   GLUC  --  97   GLUF  --  97   CALCIUM  --  9.4       Psychiatric Review of Systems:  Behavior over the last 24 hours:  unchanged.   Sleep: good  Appetite: needs improvement  Medication side effects: none  ROS: no complaints, denies shortness of breath or chest pain and all other systems are negative for acute changes    Mental Status Evaluation:  Appearance:  disheveled, overweight   Behavior:  bizarre, some agitation   Speech:  slow, scant   Mood:  less irritable   Affect:  constricted   Thought Process:  disorganized, illogical, slowing of thoughts   Thought Content:  Sikh preoccupation   Perceptual Disturbances: appears responding to internal stimuli, appears preoccupied, talks to self at times   Risk Potential: Suicidal ideation - None at present  Homicidal ideation - None  Potential for aggression - No   Memory:  recent and remote memory: unable to assess due to lack of cooperation   Sensorium  person      Consciousness:  alert and awake   Attention: decreased concentration and decreased attention span   Insight:  limited   Judgment: limited   Gait/Station: needs assistive device   Motor Activity: no abnormal movements   Progress Toward Goals:   Meli is progressing towards goals of inpatient psychiatric treatment by continued medication compliance and is attending therapeutic modalities on the milieu. However, the patient continues to require inpatient psychiatric hospitalization for continued medication management and titration to optimize symptom reduction, improve sleep hygiene, and demonstrate adequate self-care.    Assessment & Plan   Principal Problem:    Schizoaffective disorder, bipolar type (HCC)  Active Problems:    Medical clearance for psychiatric admission    Type 2 diabetes mellitus (HCC)    Obesity    Tobacco abuse    Hyperlipidemia    Esophageal reflux     Essential (primary) hypertension    Vitamin B12 deficiency    Hypoxia      Recommended Treatment: Treatment plan and medication changes discussed and per the attending physician the plan is:    1.Continue with group therapy, milieu therapy and occupational therapy  2.Behavioral Health checks every 7 minutes  3.Continue frequent safety checks and vitals per unit protocol  4.Continue with SLIM medical management as indicated  5.Continue with current medication regimen  6.Will review labs in the a.m.  7.Disposition Planning: Discharge planning and efforts remain ongoing    Behavioral Health Medications: all current active meds have been reviewed and continue current psychiatric medications.  Current Facility-Administered Medications   Medication Dose Route Frequency Provider Last Rate    acetaminophen  650 mg Oral Q4H PRN Marie Ziegler, JOSE ELIAS      acetaminophen  650 mg Oral Q4H PRN Marie Ziegler, JOSE ELIAS      acetaminophen  975 mg Oral Q6H PRN JOSE ELIAS Figueroa      atorvastatin  10 mg Oral Daily JOSE ELIAS Figueroa      bisacodyl  10 mg Rectal Daily PRN JOSE ELIAS Figueroa      carvedilol  6.25 mg Oral BID With Meals JOSE ELIAS Figueroa      cloNIDine  0.1 mg Transdermal Weekly JOSE ELIAS Figueroa      cloZAPine  125 mg Oral Daily Dereje Banuelos MD      cyanocobalamin  1,000 mcg Oral Daily JOSE ELIAS Figueroa      famotidine  20 mg Oral BID Shan Fitzgerald DO      [START ON 8/1/2024] FLUoxetine  20 mg Oral Daily Dereje Banuelos MD      fluticasone  1 puff Inhalation Daily JOSE ELIAS Figueroa      hydrOXYzine HCL  25 mg Oral Q6H PRN Max 4/day JOSE ELIAS Figueroa      hydrOXYzine HCL  50 mg Oral Q6H PRN Max 4/day JOSE ELIAS Figueroa      insulin lispro  1-5 Units Subcutaneous 4x Daily (AC & HS) JOSE ELIAS Figueroa      ipratropium-albuterol  3 mL Nebulization Q4H PRN Darin Lawton MD      LORazepam  1 mg Intramuscular Q6H PRN Max 3/day JOSE ELIAS Figueroa      LORazepam  1 mg Oral BID Dereje  MD Vin      Or    LORazepam  1 mg Intramuscular BID Dereje Banuelos MD      LORazepam  1 mg Oral Q6H PRN Max 3/day Marie Ziegler, JOSE ELIAS      melatonin  3 mg Oral HS Marie Ziegler, CRVALE      Multivitamin  15 mL Oral Daily Marie Ziegler, CRNP      nicotine  1 patch Transdermal Daily Marie Ziegler, CRNP      OLANZapine  5 mg Intramuscular Q3H PRN Max 3/day Marie Ziegler, CRNP      ondansetron  4 mg Oral Q6H PRN Darin Lawton MD      polyethylene glycol  17 g Oral Daily PRN Marie Ziegler, CRNP      polyethylene glycol  17 g Oral Daily Kristen Padillajosias, CRNP      QUEtiapine  100 mg Oral Q3H PRN Max 3/day Marie Ziegler, CRNP      QUEtiapine  25 mg Oral Q6H PRN Max 4/day Marie Ziegler, CRNP      QUEtiapine  50 mg Oral Q6H PRN Max 4/day Marie Ziegler, JOSE ELIAS      senna-docusate sodium  1 tablet Oral HS Marie Ziegler, CRNP      traZODone  50 mg Oral HS PRN Marie Ziegler, CHRISTOPHERNP         Risks / Benefits of Treatment:  Risks, benefits, and possible side effects of medications explained to patient. Patient has limited understanding of risks and benefits of treatment at this time and needs ongoing explanation of treatment benefits and treatment plan.  Patient is not likely to improve without medications and requires administration of injectable medications against her will for refusal of oral medications to assure safety of self and others    Counseling / Coordination of Care:  Patient's progress reviewed with nursing staff.  Medications, treatment progress and treatment plan reviewed with patient.  Supportive counseling provided to the patient.    Total floor/unit time spent today 25 minutes. Greater than 50% of total time was spent with the patient and / or family counseling and / or coordination of care. A description of the counseling / coordination of care: medication education, treatment plan, supportive therapy.    This note was completed in part utilizing Dragon dictation Software.  Grammatical, translation, syntax errors, random word insertions, spelling mistakes, and incomplete sentences may be an occasional consequence of this system secondary to software limitations with voice recognition, ambient noise, and hardware issues. If you have any questions or concerns about the content, text, or information contained within the body of this dictation, please contact the provider for clarification

## 2024-07-31 NOTE — NURSING NOTE
Patient is pleasant and cooperative , with poor concentration. Her behaviors and attitude is suspicious, paranoid and warm and friendly. Patient has bizarre appearance with worried affect. Patient accepted Ativan , Clozaril and Prozac and said no to other medications. Impulsive behaviors not  observed.Patient had good meal intake . Was walking with RW and assistance from 1:1. Patient remains internally preoccupied.

## 2024-08-01 LAB
GLUCOSE SERPL-MCNC: 102 MG/DL (ref 65–140)
GLUCOSE SERPL-MCNC: 122 MG/DL (ref 65–140)
GLUCOSE SERPL-MCNC: 130 MG/DL (ref 65–140)
GLUCOSE SERPL-MCNC: 187 MG/DL (ref 65–140)

## 2024-08-01 PROCEDURE — 92526 ORAL FUNCTION THERAPY: CPT

## 2024-08-01 PROCEDURE — 99232 SBSQ HOSP IP/OBS MODERATE 35: CPT | Performed by: STUDENT IN AN ORGANIZED HEALTH CARE EDUCATION/TRAINING PROGRAM

## 2024-08-01 PROCEDURE — 82948 REAGENT STRIP/BLOOD GLUCOSE: CPT

## 2024-08-01 RX ORDER — LORAZEPAM 0.5 MG/1
0.5 TABLET ORAL 2 TIMES DAILY
Status: DISCONTINUED | OUTPATIENT
Start: 2024-08-01 | End: 2024-08-01

## 2024-08-01 RX ORDER — OLANZAPINE 10 MG/2ML
5 INJECTION, POWDER, FOR SOLUTION INTRAMUSCULAR
Status: DISCONTINUED | OUTPATIENT
Start: 2024-08-01 | End: 2024-08-02

## 2024-08-01 RX ORDER — LORAZEPAM 2 MG/ML
0.5 INJECTION INTRAMUSCULAR 2 TIMES DAILY
Status: DISCONTINUED | OUTPATIENT
Start: 2024-08-01 | End: 2024-08-01

## 2024-08-01 RX ORDER — LORAZEPAM 2 MG/ML
0.5 INJECTION INTRAMUSCULAR
Status: DISCONTINUED | OUTPATIENT
Start: 2024-08-01 | End: 2024-08-15

## 2024-08-01 RX ORDER — LORAZEPAM 0.5 MG/1
0.5 TABLET ORAL
Status: DISCONTINUED | OUTPATIENT
Start: 2024-08-01 | End: 2024-08-15

## 2024-08-01 RX ORDER — OLANZAPINE 5 MG/1
5 TABLET ORAL
Status: DISCONTINUED | OUTPATIENT
Start: 2024-08-01 | End: 2024-08-02

## 2024-08-01 RX ORDER — OLANZAPINE 2.5 MG/1
2.5 TABLET, FILM COATED ORAL EVERY 6 HOURS PRN
Status: DISCONTINUED | OUTPATIENT
Start: 2024-08-01 | End: 2024-12-30 | Stop reason: HOSPADM

## 2024-08-01 RX ORDER — OLANZAPINE 10 MG/2ML
5 INJECTION, POWDER, FOR SOLUTION INTRAMUSCULAR EVERY 6 HOURS PRN
Status: DISCONTINUED | OUTPATIENT
Start: 2024-08-01 | End: 2024-12-30 | Stop reason: HOSPADM

## 2024-08-01 RX ORDER — OLANZAPINE 5 MG/1
5 TABLET ORAL EVERY 6 HOURS PRN
Status: DISCONTINUED | OUTPATIENT
Start: 2024-08-01 | End: 2024-12-30 | Stop reason: HOSPADM

## 2024-08-01 RX ORDER — OLANZAPINE 10 MG/2ML
2.5 INJECTION, POWDER, FOR SOLUTION INTRAMUSCULAR EVERY 6 HOURS PRN
Status: DISCONTINUED | OUTPATIENT
Start: 2024-08-01 | End: 2024-12-30 | Stop reason: HOSPADM

## 2024-08-01 RX ADMIN — LORAZEPAM 1 MG: 2 INJECTION INTRAMUSCULAR; INTRAVENOUS at 08:34

## 2024-08-01 RX ADMIN — ATORVASTATIN CALCIUM 10 MG: 10 TABLET, FILM COATED ORAL at 10:51

## 2024-08-01 RX ADMIN — POLYETHYLENE GLYCOL 3350 17 G: 17 POWDER, FOR SOLUTION ORAL at 12:08

## 2024-08-01 RX ADMIN — FAMOTIDINE 20 MG: 20 TABLET ORAL at 10:51

## 2024-08-01 RX ADMIN — LORAZEPAM 1 MG: 1 TABLET ORAL at 21:18

## 2024-08-01 RX ADMIN — OLANZAPINE 5 MG: 5 TABLET, FILM COATED ORAL at 20:30

## 2024-08-01 RX ADMIN — Medication 15 ML: at 11:57

## 2024-08-01 RX ADMIN — MELATONIN TAB 3 MG 3 MG: 3 TAB at 21:18

## 2024-08-01 RX ADMIN — INSULIN LISPRO 1 UNITS: 100 INJECTION, SOLUTION INTRAVENOUS; SUBCUTANEOUS at 11:40

## 2024-08-01 RX ADMIN — CYANOCOBALAMIN TAB 1000 MCG 1000 MCG: 1000 TAB at 10:51

## 2024-08-01 RX ADMIN — LORAZEPAM 0.5 MG: 0.5 TABLET ORAL at 15:18

## 2024-08-01 RX ADMIN — SENNOSIDES AND DOCUSATE SODIUM 1 TABLET: 50; 8.6 TABLET ORAL at 21:18

## 2024-08-01 RX ADMIN — CARVEDILOL 6.25 MG: 6.25 TABLET, FILM COATED ORAL at 10:51

## 2024-08-01 RX ADMIN — CLOZAPINE 125 MG: 100 TABLET ORAL at 10:51

## 2024-08-01 RX ADMIN — FAMOTIDINE 20 MG: 20 TABLET ORAL at 17:21

## 2024-08-01 RX ADMIN — FLUOXETINE 20 MG: 20 SOLUTION ORAL at 11:48

## 2024-08-01 RX ADMIN — CARVEDILOL 6.25 MG: 6.25 TABLET, FILM COATED ORAL at 17:21

## 2024-08-01 NOTE — PROGRESS NOTES
"Progress Note - Behavioral Health   Meli Nowak 59 y.o. female MRN: [id number]  Unit/Bed#: [facility] 651-01 Encounter: [id number]    Patient was seen today for continuation of care, records reviewed and patient was discussed with the morning case review team.    Meli was seen today for psychiatric follow-up.  On assessment today, Meli was restless and intermittently irritable.  Clozaril initially refused, patient is noted to be more paranoid today with delusions including being pregnant with an \"evil baby\".  More agreeable to be compliant with medication regimen after frequent encouragement by multiple staff members. Pharmacy consulted and discussion held at great length due to elevated NMS risks and patient's need for continued antipsychotic use.  In agreement to start second antipsychotic Zyprexa 5 mg nightly as patient has failed multiple medication trials, we will also continue with Clozaril 125 mg daily as patient remains disorganized and illogical with perceptual disturbances including AVH, Gnosticist preoccupation and paranoia.  Both medications to be up titrated carefully due to complicated medical/psychiatric history, we will also consider possible off titration of Zyprexa if insight as well as judgment into current mental illness improves.  Ativan also to be tapered to 3 times daily to assist with paranoid associated anxiety.  Weekly labs to be obtained on 8/5/2024 for continued drug therapy.  Tentative discharge ongoing as patient continues to undergo medication management.    Meli denies acute suicidal/self-harm ideation/intent/plan upon direct inquiry at this time.  Meli remains behaviorally appropriate, no agitation or aggression noted on exam or in report.  Meli also denies HI/AH/VH, and does not appear overtly manic.  No overt delusions or paranoia are verbalized. Impulse control is intact.  Meli remains adherent to her current psychotropic medication regimen and denies any side effects from " medications, as well as none noted on exam.    Vitals:  Vitals:    08/01/24 1050   BP: 125/79   Pulse: (!) 110   Resp:    Temp:    SpO2: 95%       Laboratory Results:  I have personally reviewed all pertinent laboratory/tests results.    Psychiatric Review of Systems:  Behavior over the last 24 hours:  unchanged.   Sleep: good  Appetite: needs improvement  Medication side effects: none  ROS: no complaints, denies shortness of breath or chest pain and all other systems are negative for acute changes    Mental Status Evaluation:  Appearance:  disheveled, marginal hygiene, overweight, bearded, bizarre   Behavior:  bizarre, restless, slow responses   Speech:  slow, soft   Mood:  anxious, irritable   Affect:  constricted   Thought Process:  disorganized, illogical   Thought Content:  paranoid delusions, negative thoughts, ruminating thoughts   Perceptual Disturbances: appears responding to internal stimuli, appears preoccupied, talks to self at times   Risk Potential: Suicidal ideation - None  Homicidal ideation - None  Potential for aggression - No   Memory:  recent and remote memory: unable to assess due to lack of cooperation   Sensorium  person      Consciousness:  alert and awake   Attention: decreased concentration and decreased attention span   Insight:  limited   Judgment: limited   Gait/Station: unable to assess   Motor Activity: no abnormal movements   Progress Toward Goals:   Meli is progressing towards goals of inpatient psychiatric treatment by continued medication compliance and is attending therapeutic modalities on the milieu. However, the patient continues to require inpatient psychiatric hospitalization for continued medication management and titration to optimize symptom reduction, improve sleep hygiene, and demonstrate adequate self-care.    Assessment & Plan   Principal Problem:    Schizoaffective disorder, bipolar type (HCC)  Active Problems:    Medical clearance for psychiatric admission    Type 2  diabetes mellitus (HCC)    Obesity    Tobacco abuse    Hyperlipidemia    Esophageal reflux    Essential (primary) hypertension    Vitamin B12 deficiency    Hypoxia      Recommended Treatment: Treatment plan and medication changes discussed and per the attending physician the plan is:    1.Continue with group therapy, milieu therapy and occupational therapy  2.Behavioral Health checks every 7 minutes  3.Continue frequent safety checks and vitals per unit protocol  4.Continue with SLIM medical management as indicated  5.Continue with current medication regimen  6.Will review labs in the a.m.  7.Disposition Planning: Discharge planning and efforts remain ongoing    Behavioral Health Medications: all current active meds have been reviewed and continue current psychiatric medications.  Current Facility-Administered Medications   Medication Dose Route Frequency Provider Last Rate    acetaminophen  650 mg Oral Q4H PRN JOSE ELIAS Figueroa      acetaminophen  650 mg Oral Q4H PRN JOSE ELIAS Figueroa      acetaminophen  975 mg Oral Q6H PRN JOSE ELIAS Figueroa      atorvastatin  10 mg Oral Daily JOSE ELIAS Figueroa      bisacodyl  10 mg Rectal Daily PRN JOSE ELIAS Figueroa      carvedilol  6.25 mg Oral BID With Meals JOSE ELIAS Figueroa      cloNIDine  0.1 mg Transdermal Weekly JOSE ELIAS Figueroa      cloZAPine  125 mg Oral Daily Dereje Banuelos MD      cyanocobalamin  1,000 mcg Oral Daily JOSE ELIAS Figueroa      famotidine  20 mg Oral BID Shan Fitzgerald DO      FLUoxetine  20 mg Oral Daily Dereje Banuelos MD      fluticasone  1 puff Inhalation Daily JOSE ELIAS Figueroa      hydrOXYzine HCL  25 mg Oral Q6H PRN Max 4/day JOSE ELIAS Figueroa      hydrOXYzine HCL  50 mg Oral Q6H PRN Max 4/day JOSE ELIAS Figueroa      insulin lispro  1-5 Units Subcutaneous 4x Daily (AC & HS) JOSE ELIAS Figueroa      ipratropium-albuterol  3 mL Nebulization Q4H PRN Darin Lawton MD      LORazepam  0.5 mg Sublingual  After Lunch Dereje Banuelos MD      Or    LORazepam  0.5 mg Intramuscular After Lunch Dereje Banuelos MD      LORazepam  1 mg Intramuscular Q6H PRN Max 3/day JOSE ELIAS Figueroa      LORazepam  1 mg Oral BID Dereje Banuelos MD      Or    LORazepam  1 mg Intramuscular BID Dereje Banuelos MD      LORazepam  1 mg Oral Q6H PRN Max 3/day JOSE ELIAS Figureoa      melatonin  3 mg Oral HS JOSE ELIAS Figueroa      Multivitamin  15 mL Oral Daily JOSE ELIAS Figueroa      nicotine  1 patch Transdermal Daily JOSE ELIAS Figueroa      OLANZapine  2.5 mg Oral Q6H PRN Dereje Banuelos MD      Or    OLANZapine  2.5 mg Intramuscular Q6H PRN Dereje Banuelos MD      OLANZapine  5 mg Oral HS Dereje Banuelos MD      Or    OLANZapine  5 mg Intramuscular HS Dereje Banuelos MD      OLANZapine  5 mg Oral Q6H PRN Dereje Banuelos MD      Or    OLANZapine  5 mg Intramuscular Q6H PRN Dereje Banuelos MD      ondansetron  4 mg Oral Q6H PRN Darin Lawton MD      polyethylene glycol  17 g Oral Daily PRN JOSE ELIAS Figueroa      polyethylene glycol  17 g Oral Daily JOSE ELIAS Ortega      senna-docusate sodium  1 tablet Oral HS JOSE ELIAS Figueroa      traZODone  50 mg Oral HS PRN JOSE ELIAS Figueroa         Risks / Benefits of Treatment:  Risks, benefits, and possible side effects of medications explained to patient. Patient has limited understanding of risks and benefits of treatment at this time and needs ongoing explanation of treatment benefits and treatment plan.  Patient is not likely to improve without medications and requires administration of injectable medications against her will for refusal of oral medications to assure safety of self and others  The patient has a history of at least 3 antipsychotic medication trials and at this time requires treatment with 2 antipsychotic agents (Zyprexa and Clozaril) due to failed multiple trials of monotherapy.    Counseling / Coordination of Care:  Patient's progress reviewed with  nursing staff.  Medications, treatment progress and treatment plan reviewed with patient.  Supportive counseling provided to the patient.    Total floor/unit time spent today 25 minutes. Greater than 50% of total time was spent with the patient and / or family counseling and / or coordination of care. A description of the counseling / coordination of care: medication education, treatment plan, supportive therapy.    This note was completed in part utilizing Dragon dictation Software. Grammatical, translation, syntax errors, random word insertions, spelling mistakes, and incomplete sentences may be an occasional consequence of this system secondary to software limitations with voice recognition, ambient noise, and hardware issues. If you have any questions or concerns about the content, text, or information contained within the body of this dictation, please contact the provider for clarification

## 2024-08-01 NOTE — NURSING NOTE
Patient was visible in the milieu with her 1:1 staff. Endorses A/H that somebody is in her body twisting her because a there is a baby in it. Patient also stated there is evil in her can it be taken out, denies all other psych s/s. Disorganized thought process and confusion noted. No complaints offered. Had 100% for dinner. Took her medications but declined melatonin. Maintained on 1:1 observation for safety. Safety checks ongoing.

## 2024-08-01 NOTE — SPEECH THERAPY NOTE
Speech Language/Pathology    Speech/Language Pathology Progress Note    Patient Name: Meli Nowak  Today's Date: 2024                     SLP RECOMMENDATIONS:         Diet: Level 3 Dental soft         Liquids: Thin liquids         Medications: as best tolerated         Aspiration Precautions: upright, slow rate        Summary:  Pt with improved alertness this session. Pt agreeable to trials of advanced textures. Pt observed with regular solids (crackers) with tendency to take large bites, but mastication was functional. Pt observed with consecutive straw sips of thin liquid with no overt s/s aspiration. Pt noted to have fast rate of intake across most trials despite cueing for slower rate. Alertness and mastication appear improved compared to previous session. Recommend diet advancement to level 3 Dental soft/thin liquid diet. SLP reviewed aspiration precautions with pt.     Assessment:  Slow, but functional mastication, No overt s/s aspiration with thin liquids, noted impulsivity     Plan/Recommendations:  Level 3 Dental soft/thin liquids   Aspiration precautions  SLP to follow         Lab Results   Component Value Date    WBC 8.36 2024    HGB 14.1 2024    HCT 46.1 2024    MCV 95 2024     2024           Problem List  Principal Problem:    Schizoaffective disorder, bipolar type (HCC)  Active Problems:    Medical clearance for psychiatric admission    Type 2 diabetes mellitus (HCC)    Obesity    Tobacco abuse    Hyperlipidemia    Esophageal reflux    Essential (primary) hypertension    Vitamin B12 deficiency    Hypoxia       Past Medical History  Past Medical History:   Diagnosis Date    Cognitive impairment     Diabetes mellitus (HCC)     Essential (primary) hypertension     Psychosis (HCC)         Past Surgical History  Past Surgical History:   Procedure Laterality Date     SECTION      CHOLECYSTECTOMY

## 2024-08-01 NOTE — NURSING NOTE
"Patient noticeably brighter and less paranoid. Patient seen laughing and holding conversation with 1:1 staff. Patient accepted scheduled medications throughout day as she became less paranoid. Patient states \"I feel better.\" Patient independently ambulating down hallway. Patient able to and encouraged to inform staff of any needs.   "

## 2024-08-01 NOTE — NURSING NOTE
Per 1:1 staff, patient verbalized willingness to take medications. Provider notified and approved administration of medications off schedule. Vitals assessed and scheduled medications administered crushed in yogurt. Patient assisted out of bed and assisted with ADLs. Patient currently sitting in wheelchair with 1:1 continual observation.

## 2024-08-01 NOTE — TREATMENT TEAM
08/01/24 0746   Team Meeting   Meeting Type Daily Rounds   Initial Conference Date 08/01/24   Team Members Present   Team Members Present Physician;Nurse;   Physician Team Member Dr. Banuelos, Dr. Armando, Marie MOCTEZUMA   Nursing Team Member Clarissa   Care Management Team Member Mercedes   Patient/Family Present   Patient Present No   Patient's Family Present No     Patient is not taking Clozaril for last 3 days, refusing PO meds. Patient is meds over objection. Patient continues with , reported an evil baby twisting around inside of her. Patient remains paranoid. Ativan to be increased slightly.  Potential d/c on 8/23/24.

## 2024-08-01 NOTE — NURSING NOTE
Patient refused AM medications including Clozaril. IM Ativan administered per meds over objection order. Provider notified.

## 2024-08-01 NOTE — PLAN OF CARE
Problem: DISCHARGE PLANNING - CARE MANAGEMENT  Goal: Discharge to post-acute care or home with appropriate resources  Description: INTERVENTIONS:  - Conduct assessment to determine patient/family and health care team treatment goals, and need for post-acute services based on payer coverage, community resources, and patient preferences, and barriers to discharge  - Address psychosocial, clinical, and financial barriers to discharge as identified in assessment in conjunction with the patient/family and health care team  - Arrange appropriate level of post-acute services according to patient’s   needs and preference and payer coverage in collaboration with the physician and health care team  - Communicate with and update the patient/family, physician, and health care team regarding progress on the discharge plan  - Arrange appropriate transportation to post-acute venues  Outcome: Progressing     Problem: Prexisting or High Potential for Compromised Skin Integrity  Goal: Skin integrity is maintained or improved  Description: INTERVENTIONS:  - Identify patients at risk for skin breakdown  - Assess and monitor skin integrity  - Assess and monitor nutrition and hydration status  - Monitor labs   - Assess for incontinence   - Turn and reposition patient  - Assist with mobility/ambulation  - Relieve pressure over bony prominences  - Avoid friction and shearing  - Provide appropriate hygiene as needed including keeping skin clean and dry  - Evaluate need for skin moisturizer/barrier cream  - Collaborate with interdisciplinary team   - Patient/family teaching  - Consider wound care consult   Outcome: Progressing     Problem: Alteration in Thoughts and Perception  Goal: Treatment Goal: Gain control of psychotic behaviors/thinking, reduce/eliminate presenting symptoms and demonstrate improved reality functioning upon discharge  Outcome: Progressing  Goal: Verbalize thoughts and feelings  Description: Interventions:  - Promote  a nonjudgmental and trusting relationship with the patient through active listening and therapeutic communication  - Assess patient's level of functioning, behavior and potential for risk  - Engage patient in 1 on 1 interactions  - Encourage patient to express fears, feelings, frustrations, and discuss symptoms    - [place] patient to reality, help patient recognize reality-based thinking   - Administer medications as ordered and assess for potential side effects  - Provide the patient education related to the signs and symptoms of the illness and desired effects of prescribed medications  Outcome: Progressing  Goal: Refrain from acting on delusional thinking/internal stimuli  Description: Interventions:  - Monitor patient closely, per order   - Utilize least restrictive measures   - Set reasonable limits, give positive feedback for acceptable   - Administer medications as ordered and monitor of potential side effects  Outcome: Progressing  Goal: Agree to be compliant with medication regime, as prescribed and report medication side effects  Description: Interventions:  - Offer appropriate PRN medication and supervise ingestion; conduct AIMS, as needed   Outcome: Progressing  Goal: Attend and participate in unit activities, including therapeutic, recreational, and educational groups  Description: Interventions:  -Encourage Visitation and family involvement in care  Outcome: Progressing  Goal: Recognize dysfunctional thoughts, communicate reality-based thoughts at the time of discharge  Description: Interventions:  - Provide medication and psycho-education to assist patient in compliance and developing insight into his/her illness   Outcome: Progressing  Goal: Complete daily ADLs, including personal hygiene independently, as able  Description: Interventions:  - Observe, teach, and assist patient with ADLS  - Monitor and promote a balance of rest/activity, with adequate nutrition and elimination   Outcome: Progressing      Problem: Ineffective Coping  Goal: Identifies ineffective coping skills  Outcome: Progressing  Goal: Identifies healthy coping skills  Outcome: Progressing  Goal: Demonstrates healthy coping skills  Outcome: Progressing  Goal: Participates in unit activities  Description: Interventions:  - Provide therapeutic environment   - Provide required programming   - Redirect inappropriate behaviors   Outcome: Progressing  Goal: Patient/Family participate in treatment and DC plans  Description: Interventions:  - Provide therapeutic environment  Outcome: Progressing  Goal: Patient/Family verbalizes awareness of resources  Outcome: Progressing  Goal: Understands least restrictive measures  Description: Interventions:  - Utilize least restrictive behavior  Outcome: Progressing  Goal: Free from restraint events  Description: - Utilize least restrictive measures   - Provide behavioral interventions   - Redirect inappropriate behaviors   Outcome: Progressing     Problem: Risk for Self Injury/Neglect  Goal: Treatment Goal: Remain safe during length of stay, learn and adopt new coping skills, and be free of self-injurious ideation, impulses and acts at the time of discharge  Outcome: Progressing  Goal: Verbalize thoughts and feelings  Description: Interventions:  - Assess and re-assess patient's lethality and potential for self-injury  - Engage patient in 1:1 interactions, daily, for a minimum of 15 minutes  - Encourage patient to express feelings, fears, frustrations, hopes  - Establish rapport/trust with patient   Outcome: Progressing  Goal: Refrain from harming self  Description: Interventions:  - Monitor patient closely, per order  - Develop a trusting relationship  - Supervise medication ingestion, monitor effects and side effects   Outcome: Progressing  Goal: Attend and participate in unit activities, including therapeutic, recreational, and educational groups  Description: Interventions:  - Provide therapeutic and  educational activities daily, encourage attendance and participation, and document same in the medical record  - Obtain collateral information, encourage visitation and family involvement in care   Outcome: Progressing  Goal: Recognize maladaptive responses and adopt new coping mechanisms  Outcome: Progressing  Goal: Complete daily ADLs, including personal hygiene independently, as able  Description: Interventions:  - Observe, teach, and assist patient with ADLS  - Monitor and promote a balance of rest/activity, with adequate nutrition and elimination  Outcome: Progressing     Problem: Depression  Goal: Treatment Goal: Demonstrate behavioral control of depressive symptoms, verbalize feelings of improved mood/affect, and adopt new coping skills prior to discharge  Outcome: Progressing  Goal: Verbalize thoughts and feelings  Description: Interventions:  - Assess and re-assess patient's level of risk   - Engage patient in 1:1 interactions, daily, for a minimum of 15 minutes   - Encourage patient to express feelings, fears, frustrations, hopes   Outcome: Progressing  Goal: Refrain from harming self  Description: Interventions:  - Monitor patient closely, per order   - Supervise medication ingestion, monitor effects and side effects   Outcome: Progressing  Goal: Refrain from isolation  Description: Interventions:  - Develop a trusting relationship   - Encourage socialization   Outcome: Progressing  Goal: Refrain from self-neglect  Outcome: Progressing  Goal: Attend and participate in unit activities, including therapeutic, recreational, and educational groups  Description: Interventions:  - Provide therapeutic and educational activities daily, encourage attendance and participation, and document same in the medical record   Outcome: Progressing  Goal: Complete daily ADLs, including personal hygiene independently, as able  Description: Interventions:  - Observe, teach, and assist patient with ADLS  -  Monitor and promote  a balance of rest/activity, with adequate nutrition and elimination   Outcome: Progressing     Problem: Anxiety  Goal: Anxiety is at manageable level  Description: Interventions:  - Assess and monitor patient's anxiety level.   - Monitor for signs and symptoms (heart palpitations, chest pain, shortness of breath, headaches, nausea, feeling jumpy, restlessness, irritable, apprehensive).   - Collaborate with interdisciplinary team and initiate plan and interventions as ordered.  - [place] patient to unit/surroundings  - Explain treatment plan  - Encourage participation in care  - Encourage verbalization of concerns/fears  - Identify coping mechanisms  - Assist in developing anxiety-reducing skills  - Administer/offer alternative therapies  - Limit or eliminate stimulants  Outcome: Progressing     Problem: SAFETY,RESTRAINT: NV/NON-SELF DESTRUCTIVE BEHAVIOR  Goal: Remains free of harm/injury (restraint for non violent/non self-detsructive behavior)  Description: INTERVENTIONS:  - Instruct patient/family regarding restraint use   - Assess and monitor physiologic and psychological status   - Provide interventions and comfort measures to meet assessed patient needs   - Identify and implement measures to help patient regain control  - Assess readiness for release of restraint   Outcome: Progressing  Goal: Returns to optimal restraint-free functioning  Description: INTERVENTIONS:  - Assess the patient's behavior and symptoms that indicate continued need for restraint  - Identify and implement measures to help patient regain control  - Assess readiness for release of restraint   Outcome: Progressing     Problem: Potential for Falls  Goal: Patient will remain free of falls  Description: INTERVENTIONS:  - Educate patient on patient safety including physical limitations  - Instruct patient to call for assistance with activity   - Consult OT/PT to assist with strengthening/mobility   - Keep Call bell within reach  - Keep bed low  and locked with side rails adjusted as appropriate  - Keep care items and personal belongings within reach  - Initiate and maintain comfort rounds  - Offer Toileting every 2 Hours, in advance of need  - Initiate/Maintain bed and chair alarm  - Obtain necessary fall risk management equipment: walker, wheelchair  - Apply yellow socks and bracelet for high fall risk patients  - Patient moved to room near nurses station  Outcome: Progressing     Problem: Nutrition/Hydration-ADULT  Goal: Nutrient/Hydration intake appropriate for improving, restoring or maintaining nutritional needs  Description: Monitor and assess patient's nutrition/hydration status for malnutrition. Collaborate with interdisciplinary team and initiate plan and interventions as ordered.  Monitor patient's weight and dietary intake as ordered or per policy. Utilize nutrition screening tool and intervene as necessary. Determine patient's food preferences and provide high-protein, high-caloric foods as appropriate.     INTERVENTIONS:  - Monitor oral intake, urinary output, labs, and treatment plans  - Assess nutrition and hydration status and recommend course of action  - Evaluate amount of meals eaten  - Assist patient with eating if necessary   - Allow adequate time for meals  - Recommend/ encourage appropriate diets, oral nutritional supplements, and vitamin/mineral supplements  - Order, calculate, and assess calorie counts as needed  - Recommend, monitor, and adjust tube feedings and TPN/PPN based on assessed needs  - Assess need for intravenous fluids  - Provide specific nutrition/hydration education as appropriate  - Include patient/family/caregiver in decisions related to nutrition  Outcome: Progressing

## 2024-08-02 ENCOUNTER — APPOINTMENT (INPATIENT)
Dept: RADIOLOGY | Facility: HOSPITAL | Age: 59
DRG: 885 | End: 2024-08-02
Payer: MEDICARE

## 2024-08-02 PROBLEM — R26.2 AMBULATORY DYSFUNCTION: Status: ACTIVE | Noted: 2024-08-02

## 2024-08-02 LAB
CK SERPL-CCNC: 54 U/L (ref 26–192)
CRP SERPL QL: 7.8 MG/L
GLUCOSE SERPL-MCNC: 111 MG/DL (ref 65–140)
GLUCOSE SERPL-MCNC: 114 MG/DL (ref 65–140)
GLUCOSE SERPL-MCNC: 141 MG/DL (ref 65–140)
GLUCOSE SERPL-MCNC: 191 MG/DL (ref 65–140)
GLUCOSE SERPL-MCNC: 91 MG/DL (ref 65–140)
PROCALCITONIN SERPL-MCNC: 0.07 NG/ML

## 2024-08-02 PROCEDURE — 84145 PROCALCITONIN (PCT): CPT | Performed by: NURSE PRACTITIONER

## 2024-08-02 PROCEDURE — 82948 REAGENT STRIP/BLOOD GLUCOSE: CPT

## 2024-08-02 PROCEDURE — 86140 C-REACTIVE PROTEIN: CPT

## 2024-08-02 PROCEDURE — 71045 X-RAY EXAM CHEST 1 VIEW: CPT

## 2024-08-02 PROCEDURE — 82550 ASSAY OF CK (CPK): CPT

## 2024-08-02 PROCEDURE — 99232 SBSQ HOSP IP/OBS MODERATE 35: CPT | Performed by: STUDENT IN AN ORGANIZED HEALTH CARE EDUCATION/TRAINING PROGRAM

## 2024-08-02 PROCEDURE — 92526 ORAL FUNCTION THERAPY: CPT

## 2024-08-02 RX ORDER — OLANZAPINE 10 MG/2ML
5 INJECTION, POWDER, FOR SOLUTION INTRAMUSCULAR
Status: DISCONTINUED | OUTPATIENT
Start: 2024-08-02 | End: 2024-08-05

## 2024-08-02 RX ORDER — OLANZAPINE 5 MG/1
5 TABLET, ORALLY DISINTEGRATING ORAL
Status: DISCONTINUED | OUTPATIENT
Start: 2024-08-02 | End: 2024-08-05

## 2024-08-02 RX ORDER — CLONIDINE 0.1 MG/24H
0.1 PATCH, EXTENDED RELEASE TRANSDERMAL WEEKLY
Status: DISCONTINUED | OUTPATIENT
Start: 2024-08-02 | End: 2024-08-11 | Stop reason: SDUPTHER

## 2024-08-02 RX ADMIN — HYDROXYZINE HYDROCHLORIDE 50 MG: 50 TABLET, FILM COATED ORAL at 02:57

## 2024-08-02 RX ADMIN — CYANOCOBALAMIN TAB 1000 MCG 1000 MCG: 1000 TAB at 09:29

## 2024-08-02 RX ADMIN — FLUOXETINE 20 MG: 20 SOLUTION ORAL at 12:18

## 2024-08-02 RX ADMIN — LORAZEPAM 1 MG: 2 INJECTION INTRAMUSCULAR; INTRAVENOUS at 21:29

## 2024-08-02 RX ADMIN — ATORVASTATIN CALCIUM 10 MG: 10 TABLET, FILM COATED ORAL at 09:28

## 2024-08-02 RX ADMIN — FAMOTIDINE 20 MG: 20 TABLET ORAL at 17:12

## 2024-08-02 RX ADMIN — OLANZAPINE 5 MG: 5 TABLET, ORALLY DISINTEGRATING ORAL at 20:31

## 2024-08-02 RX ADMIN — LORAZEPAM 1 MG: 2 INJECTION INTRAMUSCULAR; INTRAVENOUS at 09:28

## 2024-08-02 RX ADMIN — CLOZAPINE 150 MG: 100 TABLET ORAL at 09:28

## 2024-08-02 RX ADMIN — CARVEDILOL 6.25 MG: 6.25 TABLET, FILM COATED ORAL at 17:12

## 2024-08-02 RX ADMIN — LORAZEPAM 0.5 MG: 0.5 TABLET ORAL at 14:44

## 2024-08-02 RX ADMIN — CLONIDINE 0.1 MG: 0.1 PATCH TRANSDERMAL at 14:45

## 2024-08-02 RX ADMIN — POLYETHYLENE GLYCOL 3350 17 G: 17 POWDER, FOR SOLUTION ORAL at 09:30

## 2024-08-02 RX ADMIN — ACETAMINOPHEN 975 MG: 325 TABLET, FILM COATED ORAL at 17:18

## 2024-08-02 NOTE — NURSING NOTE
Patient  remains on 1:1 observation.  She did get OOB this AM and showered.  She then started pacing the unit.  Patient became diaphoretic and SOB.  She was escorted back to her room. Staff attempted to obtain BS,BP and O2 saturation.  Patient was uncooperative and needed redirection multiple time.  BS, BP and pulse ox were eventually obtained.  O2 sat was 89%, therefore she was placed on 2L NC.  Patient continued to be diaphoretic and uncooperative.  She kept trying to get up from her chair.  She was unsteady on her feet and unable to follow directions.  NP witnessed patient's behavior and a waist belt was ordered for patient safety.  Patient also reused to take her PO medications this Am therefore, 1mg IM ativan given in the left deltoid.  Will continue with 1:1 observation.

## 2024-08-02 NOTE — NURSING NOTE
Patient resting at this time and appears to have obtained good results from Atarax. Will monitor.   no

## 2024-08-02 NOTE — NURSING NOTE
Patient awoke due to large amount of urinary incontinence. Cleansed and bed changed. Emotional support provided for her fears and paranoid behavior. Patient remains wearing O2 intermittently. PO- 95% on RA. Will monitor.

## 2024-08-02 NOTE — CASE MANAGEMENT
CM securely emailed the patient's placement level of care assessment request to [facility]. CM will await a determination.

## 2024-08-02 NOTE — NURSING NOTE
Patient visible intermittently in the milieu. Pt remains on a 1:1 for safety. Pt noted to be internally preoccupied at times but pleasant. Compliant with hs meds. Will continue to monitor.

## 2024-08-02 NOTE — NURSING NOTE
2 failed attempts at drawing labs ordered.  Patient encouraged to drink water.  Will attempt again later this evening.

## 2024-08-02 NOTE — TREATMENT TEAM
08/02/24 0255   Powell Anxiety Scale   Anxious Mood 3   Tension 3   Fears 4   Insomnia 3   Intellectual 3   Depressed Mood 1   Somatic Complaints: Muscular 0   Somatic Complaints: Sensory 0   Cardiovascular Symptoms 0   Respiratory Symptoms 0   Gastrointestinal Symptoms 0   Genitourinary Symptoms 0   Autonomic Symptoms 2   Behavior at Interview 4   Powell Anxiety Score 23     Patient restless and reports AH and VH. Patient reports fear. Patient with increased HR- 112. Patient provided emotional support and medicated as per order with Atarax 50 mg for anxiety. Patient ate a yogurt and drank a cup of water. Patient reassured of her safety. Will continue to monitor and support during treatment.

## 2024-08-02 NOTE — SPEECH THERAPY NOTE
Speech Language/Pathology    Speech/Language Pathology Progress Note    Patient Name: Meli Nowak  Today's Date: 2024                     SLP RECOMMENDATIONS:         Diet: Level 3 Dental soft        Liquids: Thin liquids         Medications: as best tolerated         Aspiration Precautions: upright, slow rate        Summary:  Pt with limited participation this session. Pt declined all of breakfast meal and refused AM meds. Pt's diet was advanced from level 2 mech soft to Level 3 dental soft yesterday. Per staff and chart, pt consumed 100% of lunch meal and 25% of dinner yesterday without observed difficulty. Pt observed only with small sips of thin liquid with no overt s/s aspiration. Pt declined all solid foods. Will continue current diet at this time. Encourage aspiration precautions and thorough oral care. Only feed when alert.     Assessment:  No overt s/s aspiration with thin, limited by participation     Plan/Recommendations:  Level 3 Dental soft/thin liquids   Aspiration precautions  SLP to follow with trial tray of regular solids when alertness/participation improves         Lab Results   Component Value Date    WBC 8.36 2024    HGB 14.1 2024    HCT 46.1 2024    MCV 95 2024     2024           Problem List  Principal Problem:    Schizoaffective disorder, bipolar type (HCC)  Active Problems:    Medical clearance for psychiatric admission    Type 2 diabetes mellitus (HCC)    Obesity    Tobacco abuse    Hyperlipidemia    Esophageal reflux    Essential (primary) hypertension    Vitamin B12 deficiency    Hypoxia    Ambulatory dysfunction       Past Medical History  Past Medical History:   Diagnosis Date    Cognitive impairment     Diabetes mellitus (HCC)     Essential (primary) hypertension     Psychosis (HCC)         Past Surgical History  Past Surgical History:   Procedure Laterality Date     SECTION      CHOLECYSTECTOMY

## 2024-08-02 NOTE — NURSING NOTE
Patient did not eat any lunch however, she is more cooperative.  She was agreeable to taking most of her medications including the clozaril and prozac. Patient then requested to go to bed therefore, the waist belt was discontinued.

## 2024-08-02 NOTE — PLAN OF CARE
Problem: Prexisting or High Potential for Compromised Skin Integrity  Goal: Skin integrity is maintained or improved  Description: INTERVENTIONS:  - Identify patients at risk for skin breakdown  - Assess and monitor skin integrity  - Assess and monitor nutrition and hydration status  - Monitor labs   - Assess for incontinence   - Turn and reposition patient  - Assist with mobility/ambulation  - Relieve pressure over bony prominences  - Avoid friction and shearing  - Provide appropriate hygiene as needed including keeping skin clean and dry  - Evaluate need for skin moisturizer/barrier cream  - Collaborate with interdisciplinary team   - Patient/family teaching  - Consider wound care consult   Outcome: Progressing     Problem: Alteration in Thoughts and Perception  Goal: Refrain from acting on delusional thinking/internal stimuli  Description: Interventions:  - Monitor patient closely, per order   - Utilize least restrictive measures   - Set reasonable limits, give positive feedback for acceptable   - Administer medications as ordered and monitor of potential side effects  Outcome: Not Progressing  Goal: Agree to be compliant with medication regime, as prescribed and report medication side effects  Description: Interventions:  - Offer appropriate PRN medication and supervise ingestion; conduct AIMS, as needed   Outcome: Progressing  Goal: Complete daily ADLs, including personal hygiene independently, as able  Description: Interventions:  - Observe, teach, and assist patient with ADLS  - Monitor and promote a balance of rest/activity, with adequate nutrition and elimination   Outcome: Progressing     Problem: Ineffective Coping  Goal: Free from restraint events  Description: - Utilize least restrictive measures   - Provide behavioral interventions   - Redirect inappropriate behaviors   Outcome: Progressing     Problem: Risk for Self Injury/Neglect  Goal: Refrain from harming self  Description: Interventions:  -  Monitor patient closely, per order  - Develop a trusting relationship  - Supervise medication ingestion, monitor effects and side effects   Outcome: Progressing     Problem: Potential for Falls  Goal: Patient will remain free of falls  Description: INTERVENTIONS:  - Educate patient on patient safety including physical limitations  - Instruct patient to call for assistance with activity   - Consult OT/PT to assist with strengthening/mobility   - Keep Call bell within reach  - Keep bed low and locked with side rails adjusted as appropriate  - Keep care items and personal belongings within reach  - Initiate and maintain comfort rounds  - Offer Toileting every 2 Hours, in advance of need  - Initiate/Maintain bed and chair alarm  - Obtain necessary fall risk management equipment: walker, wheelchair  - Apply yellow socks and bracelet for high fall risk patients  - Patient moved to room near nurses station  Outcome: Progressing     Problem: Nutrition/Hydration-ADULT  Goal: Nutrient/Hydration intake appropriate for improving, restoring or maintaining nutritional needs  Description: Monitor and assess patient's nutrition/hydration status for malnutrition. Collaborate with interdisciplinary team and initiate plan and interventions as ordered.  Monitor patient's weight and dietary intake as ordered or per policy. Utilize nutrition screening tool and intervene as necessary. Determine patient's food preferences and provide high-protein, high-caloric foods as appropriate.     INTERVENTIONS:  - Monitor oral intake, urinary output, labs, and treatment plans  - Assess nutrition and hydration status and recommend course of action  - Evaluate amount of meals eaten  - Assist patient with eating if necessary   - Allow adequate time for meals  - Recommend/ encourage appropriate diets, oral nutritional supplements, and vitamin/mineral supplements  - Order, calculate, and assess calorie counts as needed  - Recommend, monitor, and adjust  tube feedings and TPN/PPN based on assessed needs  - Assess need for intravenous fluids  - Provide specific nutrition/hydration education as appropriate  - Include patient/family/caregiver in decisions related to nutrition  Outcome: Progressing

## 2024-08-02 NOTE — TREATMENT TEAM
08/02/24 0722   Team Meeting   Meeting Type Daily Rounds   Initial Conference Date 08/02/24   Team Members Present   Team Members Present Physician;Nurse;   Physician Team Member Dr. Banuelos, Marie Sweet   Nursing Team Member Jason Carcamo   Care Management Team Member Mercedes   Patient/Family Present   Patient Present No   Patient's Family Present No     Patient remains very paranoid. Patient is meds over objection. Remains on 3L of O2. AH/VH. Inturrupted sleep. Incontinent last night. Clozaril to be increased. PT recommended post acute rehab, CM will send documents to [facility] for placement referral. No discharge date pending at this time.

## 2024-08-02 NOTE — PROGRESS NOTES
Progress Note - Behavioral Health   Meli Nowak 59 y.o. female MRN: [id number]  Unit/Bed#: [facility] 651-01 Encounter: [id number]    Patient was seen today for continuation of care, records reviewed and patient was discussed with the morning case review team.    Meli was seen today for psychiatric follow-up.  On assessment today, Meli was diaphoretic, irritable and tachycardic.  This writer and attending at the bedside, slim also notified with full workup including CBC, CK and chest x-ray ordered.  Still disorganized and illogical, patient must be continuously educated about medication regimen for compliance.  Clozaril able to be administered after multiple attempts and administration of IM Ativan.  Clozaril increased to 150 mg daily for treatment resistant symptoms and Zyprexa initiated at 5 mg nightly with and MOO implemented.  No other changes to be made at this time, one-to-one remains at the bedside with posey belt in place due to safety and patient continued removal of oxygen tubing.  Tentative discharge ongoing as patient will most likely need higher level of care post discharge.    Meli denies acute suicidal/self-harm ideation/intent/plan upon direct inquiry at this time. Impulse control is intact.  Meli remains adherent to her current psychotropic medication regimen and denies any side effects from medications, as well as none noted on exam.    Vitals:  Vitals:    08/02/24 0742   BP: 90/50   Pulse: 98   Resp: 17   Temp: 98.9 °F (37.2 °C)   SpO2: 96%       Laboratory Results:  I have personally reviewed all pertinent laboratory/tests results.  Most Recent Labs:   Lab Results   Component Value Date    WBC 8.36 07/29/2024    RBC 4.87 07/29/2024    HGB 14.1 07/29/2024    HCT 46.1 07/29/2024     07/29/2024    RDW 15.7 (H) 07/29/2024    NEUTROABS 5.95 07/29/2024    SODIUM 142 07/31/2024    K 4.0 07/31/2024     07/31/2024    CO2 30 07/31/2024    BUN 14 07/31/2024    CREATININE 0.87 07/31/2024    GLUC 97  07/31/2024    GLUF 97 07/31/2024    CALCIUM 9.4 07/31/2024    AST 26 07/24/2024    ALT 28 07/24/2024    ALKPHOS 77 07/24/2024    TP 5.8 (L) 07/24/2024    ALB 3.2 (L) 07/24/2024    TBILI 0.63 07/24/2024    AMMONIA 32 07/03/2024    FVX6JDCPLSMD 2.000 07/24/2024    HGBA1C 6.4 (H) 05/03/2024     05/03/2024     Labs in last 72 hours:   Recent Labs     07/31/24  0431   SODIUM 142   K 4.0      CO2 30   BUN 14   CREATININE 0.87   GLUC 97   GLUF 97   CALCIUM 9.4       Psychiatric Review of Systems:  Behavior over the last 24 hours:  regressed.   Sleep: interrupted  Appetite: needs improvement  Medication side effects: none  ROS: no complaints, denies shortness of breath or chest pain and all other systems are negative for acute changes    Mental Status Evaluation:  Appearance:  disheveled, dressed in hospital attire, overweight   Behavior:  agitated, bizarre, restless   Speech:  decreased rate   Mood:  anxious, irritable   Affect:  constricted   Thought Process:  disorganized, illogical   Thought Content:  paranoid ideation   Perceptual Disturbances: appears responding to internal stimuli, appears preoccupied, talks to self at times   Risk Potential: Suicidal ideation - None  Homicidal ideation - None  Potential for aggression - No   Memory:  recent and remote memory: unable to assess due to lack of cooperation   Sensorium  person      Consciousness:  alert and awake   Attention: decreased concentration and decreased attention span   Insight:  limited   Judgment: limited   Gait/Station: needs assistive device   Motor Activity: no abnormal movements   Progress Toward Goals:   Meli is progressing towards goals of inpatient psychiatric treatment by continued medication compliance and is attending therapeutic modalities on the milieu. However, the patient continues to require inpatient psychiatric hospitalization for continued medication management and titration to optimize symptom reduction, improve sleep hygiene,  and demonstrate adequate self-care.    Assessment & Plan   Principal Problem:    Schizoaffective disorder, bipolar type (HCC)  Active Problems:    Medical clearance for psychiatric admission    Type 2 diabetes mellitus (HCC)    Obesity    Tobacco abuse    Hyperlipidemia    Esophageal reflux    Essential (primary) hypertension    Vitamin B12 deficiency    Hypoxia    Ambulatory dysfunction      Recommended Treatment: Treatment plan and medication changes discussed and per the attending physician the plan is:    1.Continue with group therapy, milieu therapy and occupational therapy  2.Behavioral Health checks every 7 minutes  3.Continue frequent safety checks and vitals per unit protocol  4.Continue with SLIM medical management as indicated  5.Continue with current medication regimen  6.Will review labs in the a.m.  7.Disposition Planning: Discharge planning and efforts remain ongoing    Behavioral Health Medications: all current active meds have been reviewed and continue current psychiatric medications.  Current Facility-Administered Medications   Medication Dose Route Frequency Provider Last Rate    acetaminophen  650 mg Oral Q4H PRN Marie Ziegler, CHRISTOPHERNP      acetaminophen  650 mg Oral Q4H PRN Marie Ziegler, JOSE ELIAS      acetaminophen  975 mg Oral Q6H PRN JOSE ELIAS Figueroa      atorvastatin  10 mg Oral Daily JOSE ELIAS Figueroa      bisacodyl  10 mg Rectal Daily PRN JOSE ELIAS Figueroa      carvedilol  6.25 mg Oral BID With Meals JOSE ELIAS Figueroa      cloNIDine  0.1 mg Transdermal Weekly JOSE ELIAS Figueroa      cloZAPine  150 mg Oral Daily Dereje Banuelos MD      cyanocobalamin  1,000 mcg Oral Daily JOSE ELIAS Figueroa      famotidine  20 mg Oral BID Shan Fitzgerald DO      FLUoxetine  20 mg Oral Daily Dereje Banuelos MD      fluticasone  1 puff Inhalation Daily JOSE ELIAS Figueroa      hydrOXYzine HCL  25 mg Oral Q6H PRN Max 4/day JOSE ELIAS Figueroa      hydrOXYzine HCL  50 mg Oral Q6H PRN Max  4/day JOSE ELIAS Figueroa      insulin lispro  1-5 Units Subcutaneous 4x Daily (AC & HS) JOSE ELIAS Figueroa      ipratropium-albuterol  3 mL Nebulization Q4H PRN Darin Lawton MD      LORazepam  0.5 mg Sublingual After Lunch Dereje Banuelos MD      Or    LORazepam  0.5 mg Intramuscular After Lunch Dereje Banuelos MD      LORazepam  1 mg Intramuscular Q6H PRN Max 3/day JOSE ELIAS Figueroa      LORazepam  1 mg Oral BID Dereje Banuelos MD      Or    LORazepam  1 mg Intramuscular BID Dereje Banuelos MD      LORazepam  1 mg Oral Q6H PRN Max 3/day JOSE ELIAS Figueroa      melatonin  3 mg Oral HS JOSE ELIAS Figueroa      Multivitamin  15 mL Oral Daily JOSE ELIAS Figueroa      nicotine  1 patch Transdermal Daily JOSE ELIAS Figueroa      OLANZapine  5 mg Oral HS Dereje Banuelos MD      Or    OLANZapine  5 mg Intramuscular HS Dereje Banuelos MD      OLANZapine  2.5 mg Oral Q6H PRN Dereje Banuelos MD      Or    OLANZapine  2.5 mg Intramuscular Q6H PRN Dereje Banuelos MD      OLANZapine  5 mg Oral Q6H PRN Dereje Banuelos MD      Or    OLANZapine  5 mg Intramuscular Q6H PRN Dereje Banuelos MD      ondansetron  4 mg Oral Q6H PRN Darin Lawton MD      polyethylene glycol  17 g Oral Daily PRN JOSE ELIAS Figueroa      polyethylene glycol  17 g Oral Daily JOSE ELIAS Ortega      senna-docusate sodium  1 tablet Oral HS JOSE ELIAS Figueroa      traZODone  50 mg Oral HS PRN JOSE ELIAS Figueroa         Risks / Benefits of Treatment:  Risks, benefits, and possible side effects of medications explained to patient and patient verbalizes understanding and agreement for treatment.    Counseling / Coordination of Care:  Patient's progress reviewed with nursing staff.  Medications, treatment progress and treatment plan reviewed with patient.  Supportive counseling provided to the patient.    Total floor/unit time spent today 25 minutes. Greater than 50% of total time was spent with the patient and / or family  counseling and / or coordination of care. A description of the counseling / coordination of care: medication education, treatment plan, supportive therapy.    This note was completed in part utilizing Dragon dictation Software. Grammatical, translation, syntax errors, random word insertions, spelling mistakes, and incomplete sentences may be an occasional consequence of this system secondary to software limitations with voice recognition, ambient noise, and hardware issues. If you have any questions or concerns about the content, text, or information contained within the body of this dictation, please contact the provider for clarification

## 2024-08-03 LAB
ALBUMIN SERPL BCG-MCNC: 3.7 G/DL (ref 3.5–5)
ALP SERPL-CCNC: 83 U/L (ref 34–104)
ALT SERPL W P-5'-P-CCNC: 28 U/L (ref 7–52)
ANION GAP SERPL CALCULATED.3IONS-SCNC: 8 MMOL/L (ref 4–13)
AST SERPL W P-5'-P-CCNC: 21 U/L (ref 13–39)
BASOPHILS # BLD AUTO: 0.07 THOUSANDS/ΜL (ref 0–0.1)
BASOPHILS NFR BLD AUTO: 1 % (ref 0–1)
BILIRUB SERPL-MCNC: 0.43 MG/DL (ref 0.2–1)
BUN SERPL-MCNC: 13 MG/DL (ref 5–25)
CALCIUM SERPL-MCNC: 9.4 MG/DL (ref 8.4–10.2)
CHLORIDE SERPL-SCNC: 100 MMOL/L (ref 96–108)
CO2 SERPL-SCNC: 31 MMOL/L (ref 21–32)
CREAT SERPL-MCNC: 1.07 MG/DL (ref 0.6–1.3)
EOSINOPHIL # BLD AUTO: 0 THOUSAND/ΜL (ref 0–0.61)
EOSINOPHIL NFR BLD AUTO: 0 % (ref 0–6)
ERYTHROCYTE [DISTWIDTH] IN BLOOD BY AUTOMATED COUNT: 15.5 % (ref 11.6–15.1)
GFR SERPL CREATININE-BSD FRML MDRD: 57 ML/MIN/1.73SQ M
GLUCOSE SERPL-MCNC: 105 MG/DL (ref 65–140)
GLUCOSE SERPL-MCNC: 107 MG/DL (ref 65–140)
GLUCOSE SERPL-MCNC: 138 MG/DL (ref 65–140)
GLUCOSE SERPL-MCNC: 174 MG/DL (ref 65–140)
GLUCOSE SERPL-MCNC: 99 MG/DL (ref 65–140)
HCT VFR BLD AUTO: 42.7 % (ref 34.8–46.1)
HGB BLD-MCNC: 13.6 G/DL (ref 11.5–15.4)
IMM GRANULOCYTES # BLD AUTO: 0.02 THOUSAND/UL (ref 0–0.2)
IMM GRANULOCYTES NFR BLD AUTO: 0 % (ref 0–2)
LYMPHOCYTES # BLD AUTO: 1.85 THOUSANDS/ΜL (ref 0.6–4.47)
LYMPHOCYTES NFR BLD AUTO: 24 % (ref 14–44)
MAGNESIUM SERPL-MCNC: 2.1 MG/DL (ref 1.9–2.7)
MCH RBC QN AUTO: 29.9 PG (ref 26.8–34.3)
MCHC RBC AUTO-ENTMCNC: 31.9 G/DL (ref 31.4–37.4)
MCV RBC AUTO: 94 FL (ref 82–98)
MONOCYTES # BLD AUTO: 0.86 THOUSAND/ΜL (ref 0.17–1.22)
MONOCYTES NFR BLD AUTO: 11 % (ref 4–12)
NEUTROPHILS # BLD AUTO: 5 THOUSANDS/ΜL (ref 1.85–7.62)
NEUTS SEG NFR BLD AUTO: 64 % (ref 43–75)
NRBC BLD AUTO-RTO: 0 /100 WBCS
PHOSPHATE SERPL-MCNC: 3.7 MG/DL (ref 2.7–4.5)
PLATELET # BLD AUTO: 280 THOUSANDS/UL (ref 149–390)
PMV BLD AUTO: 9.1 FL (ref 8.9–12.7)
POTASSIUM SERPL-SCNC: 3.9 MMOL/L (ref 3.5–5.3)
PROT SERPL-MCNC: 6.5 G/DL (ref 6.4–8.4)
RBC # BLD AUTO: 4.55 MILLION/UL (ref 3.81–5.12)
SODIUM SERPL-SCNC: 139 MMOL/L (ref 135–147)
WBC # BLD AUTO: 7.8 THOUSAND/UL (ref 4.31–10.16)

## 2024-08-03 PROCEDURE — 84100 ASSAY OF PHOSPHORUS: CPT | Performed by: NURSE PRACTITIONER

## 2024-08-03 PROCEDURE — 82948 REAGENT STRIP/BLOOD GLUCOSE: CPT

## 2024-08-03 PROCEDURE — 83735 ASSAY OF MAGNESIUM: CPT | Performed by: NURSE PRACTITIONER

## 2024-08-03 PROCEDURE — 85025 COMPLETE CBC W/AUTO DIFF WBC: CPT

## 2024-08-03 PROCEDURE — 99232 SBSQ HOSP IP/OBS MODERATE 35: CPT

## 2024-08-03 PROCEDURE — 80053 COMPREHEN METABOLIC PANEL: CPT

## 2024-08-03 RX ADMIN — LORAZEPAM 1 MG: 1 TABLET ORAL at 08:44

## 2024-08-03 RX ADMIN — OLANZAPINE 5 MG: 5 TABLET, ORALLY DISINTEGRATING ORAL at 19:37

## 2024-08-03 RX ADMIN — LORAZEPAM 0.5 MG: 0.5 TABLET ORAL at 14:52

## 2024-08-03 RX ADMIN — CLOZAPINE 150 MG: 100 TABLET ORAL at 08:43

## 2024-08-03 RX ADMIN — CARVEDILOL 6.25 MG: 6.25 TABLET, FILM COATED ORAL at 08:43

## 2024-08-03 RX ADMIN — CYANOCOBALAMIN TAB 1000 MCG 1000 MCG: 1000 TAB at 08:43

## 2024-08-03 RX ADMIN — HYDROXYZINE HYDROCHLORIDE 50 MG: 50 TABLET, FILM COATED ORAL at 16:47

## 2024-08-03 RX ADMIN — FLUOXETINE 20 MG: 20 SOLUTION ORAL at 11:50

## 2024-08-03 RX ADMIN — LORAZEPAM 1 MG: 2 INJECTION INTRAMUSCULAR; INTRAVENOUS at 22:09

## 2024-08-03 RX ADMIN — FAMOTIDINE 20 MG: 20 TABLET ORAL at 08:43

## 2024-08-03 RX ADMIN — ATORVASTATIN CALCIUM 10 MG: 10 TABLET, FILM COATED ORAL at 08:43

## 2024-08-03 NOTE — NURSING NOTE
Pt withdrawn to room with paranoid ideations. Responding to voices but unable to specify what they are saying. Poor appetite. Refused HS medication IM Ativan was administered in her left deltoid. Continues on 1 to 1. Q 7 minute checks maintained.

## 2024-08-03 NOTE — PLAN OF CARE
Problem: Prexisting or High Potential for Compromised Skin Integrity  Goal: Skin integrity is maintained or improved  Description: INTERVENTIONS:  - Identify patients at risk for skin breakdown  - Assess and monitor skin integrity  - Assess and monitor nutrition and hydration status  - Monitor labs   - Assess for incontinence   - Turn and reposition patient  - Assist with mobility/ambulation  - Relieve pressure over bony prominences  - Avoid friction and shearing  - Provide appropriate hygiene as needed including keeping skin clean and dry  - Evaluate need for skin moisturizer/barrier cream  - Collaborate with interdisciplinary team   - Patient/family teaching  - Consider wound care consult   Outcome: Progressing     Problem: Alteration in Thoughts and Perception  Goal: Verbalize thoughts and feelings  Description: Interventions:  - Promote a nonjudgmental and trusting relationship with the patient through active listening and therapeutic communication  - Assess patient's level of functioning, behavior and potential for risk  - Engage patient in 1 on 1 interactions  - Encourage patient to express fears, feelings, frustrations, and discuss symptoms    - [place] patient to reality, help patient recognize reality-based thinking   - Administer medications as ordered and assess for potential side effects  - Provide the patient education related to the signs and symptoms of the illness and desired effects of prescribed medications  Outcome: Not Progressing  Goal: Agree to be compliant with medication regime, as prescribed and report medication side effects  Description: Interventions:  - Offer appropriate PRN medication and supervise ingestion; conduct AIMS, as needed   Outcome: Not Progressing  Goal: Recognize dysfunctional thoughts, communicate reality-based thoughts at the time of discharge  Description: Interventions:  - Provide medication and psycho-education to assist patient in compliance and developing insight  into his/her illness   Outcome: Not Progressing     Problem: Anxiety  Goal: Anxiety is at manageable level  Description: Interventions:  - Assess and monitor patient's anxiety level.   - Monitor for signs and symptoms (heart palpitations, chest pain, shortness of breath, headaches, nausea, feeling jumpy, restlessness, irritable, apprehensive).   - Collaborate with interdisciplinary team and initiate plan and interventions as ordered.  - [place] patient to unit/surroundings  - Explain treatment plan  - Encourage participation in care  - Encourage verbalization of concerns/fears  - Identify coping mechanisms  - Assist in developing anxiety-reducing skills  - Administer/offer alternative therapies  - Limit or eliminate stimulants  Outcome: Progressing     Problem: SAFETY,RESTRAINT: NV/NON-SELF DESTRUCTIVE BEHAVIOR  Goal: Remains free of harm/injury (restraint for non violent/non self-detsructive behavior)  Description: INTERVENTIONS:  - Instruct patient/family regarding restraint use   - Assess and monitor physiologic and psychological status   - Provide interventions and comfort measures to meet assessed patient needs   - Identify and implement measures to help patient regain control  - Assess readiness for release of restraint   Outcome: Progressing     Problem: Nutrition/Hydration-ADULT  Goal: Nutrient/Hydration intake appropriate for improving, restoring or maintaining nutritional needs  Description: Monitor and assess patient's nutrition/hydration status for malnutrition. Collaborate with interdisciplinary team and initiate plan and interventions as ordered.  Monitor patient's weight and dietary intake as ordered or per policy. Utilize nutrition screening tool and intervene as necessary. Determine patient's food preferences and provide high-protein, high-caloric foods as appropriate.     INTERVENTIONS:  - Monitor oral intake, urinary output, labs, and treatment plans  - Assess nutrition and hydration status and  recommend course of action  - Evaluate amount of meals eaten  - Assist patient with eating if necessary   - Allow adequate time for meals  - Recommend/ encourage appropriate diets, oral nutritional supplements, and vitamin/mineral supplements  - Order, calculate, and assess calorie counts as needed  - Recommend, monitor, and adjust tube feedings and TPN/PPN based on assessed needs  - Assess need for intravenous fluids  - Provide specific nutrition/hydration education as appropriate  - Include patient/family/caregiver in decisions related to nutrition  Outcome: Progressing     Problem: Potential for Falls  Goal: Patient will remain free of falls  Description: INTERVENTIONS:  - Educate patient on patient safety including physical limitations  - Instruct patient to call for assistance with activity   - Consult OT/PT to assist with strengthening/mobility   - Keep Call bell within reach  - Keep bed low and locked with side rails adjusted as appropriate  - Keep care items and personal belongings within reach  - Initiate and maintain comfort rounds  - Offer Toileting every 2 Hours, in advance of need  - Initiate/Maintain bed and chair alarm  - Obtain necessary fall risk management equipment: walker, wheelchair  - Apply yellow socks and bracelet for high fall risk patients  - Patient moved to room near nurses station  Outcome: Progressing

## 2024-08-03 NOTE — PROGRESS NOTES
"Progress Note - Behavioral Health   Meli Nowak 59 y.o. female MRN: [id number]  Unit/Bed#: [facility] 651-01 Encounter: [id number]    Patient was seen today for continuation of care, records reviewed and patient was discussed with the morning case review team.    Meli was seen today for psychiatric follow-up.  On assessment today, Meli was seen in her room.  More aware of her surroundings today, patient is notably anxious and states that she needs to see the doctor right away.  This writer was ruled explained, patient then stated \" I need surgery right now, please get the doctor.  I need surgery\", comfort provided.  Patient then directly questioned about statement where as she was unable to elaborate and stared blankly at the wall.  Compliant with Clozaril and Ativan this morning, patient is notably more paranoid about liquid medications and is currently refusing Prozac and MVI.  We will consider possible change in formulary if symptoms does not improve.  MOO still implemented due to intermittent compliance, one-to-one also at the bedside due to safety concerns.  No other changes to be made at this time.  We will continue to also assess for labs as patient refuses despite ongoing education and attempts.  Weekly labs ordered for 8/5/2024 with a EKG for drug therapy.  Tentative discharge ongoing.    Meli denies acute suicidal/self-harm ideation/intent/plan upon direct inquiry at this time.  Meli remains behaviorally appropriate, no agitation or aggression noted on exam or in report.  Impulse control is improving.  Meli remains adherent to her current psychotropic medication regimen and denies any side effects from medications, as well as none noted on exam.    Vitals:  Vitals:    08/03/24 0743   BP: 169/98   Pulse: 81   Resp: 18   Temp: 97.9 °F (36.6 °C)   SpO2: 91%       Laboratory Results:  I have personally reviewed all pertinent laboratory/tests results.    Psychiatric Review of Systems:  Behavior over the last 24 " hours:  unchanged.   Sleep: Interrupted  Appetite: Needs improvement  Medication side effects: None  ROS: no complaints, denies shortness of breath or chest pain and all other systems are negative for acute changes    Mental Status Evaluation:  Appearance:  disheveled, marginal hygiene   Behavior:  agitated, bizarre   Speech:  slow   Mood:  anxious, irritable   Affect:  constricted   Thought Process:  disorganized, illogical   Thought Content:  bizarre and somatic delusions, negative thoughts, ruminating thoughts   Perceptual Disturbances: appears responding to internal stimuli, appears preoccupied, talks to self at times   Risk Potential: Suicidal ideation - None  Homicidal ideation - None  Potential for aggression - No   Memory:  recent and remote memory grossly intact   Sensorium  person and place      Consciousness:  alert and awake   Attention: attention span and concentration are age appropriate   Insight:  limited   Judgment: limited   Gait/Station: needs assistive device   Motor Activity: no abnormal movements   Progress Toward Goals:   Meli is progressing towards goals of inpatient psychiatric treatment by continued medication compliance and is attending therapeutic modalities on the milieu. However, the patient continues to require inpatient psychiatric hospitalization for continued medication management and titration to optimize symptom reduction, improve sleep hygiene, and demonstrate adequate self-care.    Assessment & Plan   Principal Problem:    Schizoaffective disorder, bipolar type (HCC)  Active Problems:    Medical clearance for psychiatric admission    Type 2 diabetes mellitus (HCC)    Obesity    Tobacco abuse    Hyperlipidemia    Esophageal reflux    Essential (primary) hypertension    Vitamin B12 deficiency    Hypoxia    Ambulatory dysfunction      Recommended Treatment: Treatment plan and medication changes discussed and per the attending physician the plan is:    1.Continue with group therapy,  milieu therapy and occupational therapy  2.Behavioral Health checks every 7 minutes  3.Continue frequent safety checks and vitals per unit protocol  4.Continue with SLIM medical management as indicated  5.Continue with current medication regimen  6.Will review labs in the a.m.  7.Disposition Planning: Discharge planning and efforts remain ongoing    Behavioral Health Medications: all current active meds have been reviewed and continue current psychiatric medications.  Current Facility-Administered Medications   Medication Dose Route Frequency Provider Last Rate    acetaminophen  650 mg Oral Q4H PRN Marie Ziegler, JOSE ELIAS      acetaminophen  650 mg Oral Q4H PRN Marie Ziegler, JOSE ELIAS      acetaminophen  975 mg Oral Q6H PRN JOSE ELIAS Figueroa      atorvastatin  10 mg Oral Daily Marie Ziegler, JOSE ELIAS      bisacodyl  10 mg Rectal Daily PRN JOSE ELIAS Figueroa      carvedilol  6.25 mg Oral BID With Meals JOSE ELIAS Figueroa      cloNIDine  0.1 mg Transdermal Weekly JOSE ELIAS Figueroa      cloZAPine  150 mg Oral Daily Dereje Banuelos MD      cyanocobalamin  1,000 mcg Oral Daily JOSE ELIAS Figueroa      famotidine  20 mg Oral BID Shan Fitzgerald DO      FLUoxetine  20 mg Oral Daily Dereje Banuelos MD      fluticasone  1 puff Inhalation Daily JOSE ELIAS Figueroa      hydrOXYzine HCL  25 mg Oral Q6H PRN Max 4/day JOSE ELIAS Figueroa      hydrOXYzine HCL  50 mg Oral Q6H PRN Max 4/day JOSE ELIAS Figueroa      insulin lispro  1-5 Units Subcutaneous 4x Daily (AC & HS) JOSE ELIAS Figueroa      ipratropium-albuterol  3 mL Nebulization Q4H PRN Darin Lawton MD      LORazepam  0.5 mg Sublingual After Lunch Dereje Banuelos MD      Or    LORazepam  0.5 mg Intramuscular After Lunch Dereje Banuelos MD      LORazepam  1 mg Intramuscular Q6H PRN Max 3/day JOSE ELIAS Figueroa      LORazepam  1 mg Oral BID Dereje Banuelos MD      Or    LORazepam  1 mg Intramuscular BID Dereje Banuelos MD      LORazepam  1 mg Oral Q6H PRN  Max 3/day JOSE ELIAS Figueroa      melatonin  3 mg Oral HS JOSE ELIAS Figueroa      Multivitamin  15 mL Oral Daily JOSE ELIAS Figueroa      nicotine  1 patch Transdermal Daily JOSE ELIAS Figueroa      OLANZapine  5 mg Oral HS Dereje Banuelos MD      Or    OLANZapine  5 mg Intramuscular HS Dereje Banuelos MD      OLANZapine  2.5 mg Oral Q6H PRN Dereje Banuelos MD      Or    OLANZapine  2.5 mg Intramuscular Q6H PRN Dereje Banuelos MD      OLANZapine  5 mg Oral Q6H PRN Dereje Banuelos MD      Or    OLANZapine  5 mg Intramuscular Q6H PRN Dereje Banuelos MD      ondansetron  4 mg Oral Q6H PRN Darin Lawton MD      polyethylene glycol  17 g Oral Daily PRN JOSE ELIAS Figueroa      polyethylene glycol  17 g Oral Daily JOSE ELIAS Ortega      senna-docusate sodium  1 tablet Oral HS JOSE ELIAS Figueroa      traZODone  50 mg Oral HS PRN JOSE ELIAS Figueroa         Risks / Benefits of Treatment:  Patient is not likely to improve without medications and will require administration of injectable medications against she will for refusal of oral medications to assure safety of self and others    Counseling / Coordination of Care:  Patient's progress reviewed with nursing staff.  Medications, treatment progress and treatment plan reviewed with patient.  Supportive counseling provided to the patient.    Total floor/unit time spent today 25 minutes. Greater than 50% of total time was spent with the patient and / or family counseling and / or coordination of care. A description of the counseling / coordination of care: medication education, treatment plan, supportive therapy.    This note was completed in part utilizing Dragon dictation Software. Grammatical, translation, syntax errors, random word insertions, spelling mistakes, and incomplete sentences may be an occasional consequence of this system secondary to software limitations with voice recognition, ambient noise, and hardware issues. If you have any  questions or concerns about the content, text, or information contained within the body of this dictation, please contact the provider for clarification

## 2024-08-03 NOTE — TREATMENT TEAM
08/03/24 1642   Powell Anxiety Scale   Anxious Mood 4   Tension 4   Fears 4   Insomnia 0   Intellectual 4   Depressed Mood 4   Somatic Complaints: Muscular 0   Somatic Complaints: Sensory 0   Cardiovascular Symptoms 0   Respiratory Symptoms 0   Gastrointestinal Symptoms 0   Genitourinary Symptoms 0   Autonomic Symptoms 0   Behavior at Interview 4   Powell Anxiety Score 24     Patient anxious, pacing in room. Patient given and accepted PRN hydroxyzine per Powell score of 24.

## 2024-08-03 NOTE — NURSING NOTE
Patient appears to be responding to internal Stimuli, but unable to specify what they are saying.  Patient anxious, diaphoretic, pacing the room. Patient keeps removing nasal canula. Patient needs heavy encouragement to keep it on.  Patient refused scheduled medications. PRN hydroxyzine appears ineffective.

## 2024-08-04 LAB
GLUCOSE SERPL-MCNC: 103 MG/DL (ref 65–140)
GLUCOSE SERPL-MCNC: 103 MG/DL (ref 65–140)
GLUCOSE SERPL-MCNC: 155 MG/DL (ref 65–140)
GLUCOSE SERPL-MCNC: 171 MG/DL (ref 65–140)

## 2024-08-04 PROCEDURE — 99232 SBSQ HOSP IP/OBS MODERATE 35: CPT

## 2024-08-04 PROCEDURE — 82948 REAGENT STRIP/BLOOD GLUCOSE: CPT

## 2024-08-04 RX ORDER — WATER 10 ML/10ML
INJECTION INTRAMUSCULAR; INTRAVENOUS; SUBCUTANEOUS
Status: COMPLETED
Start: 2024-08-04 | End: 2024-08-04

## 2024-08-04 RX ADMIN — LORAZEPAM 1 MG: 1 TABLET ORAL at 17:22

## 2024-08-04 RX ADMIN — CYANOCOBALAMIN TAB 1000 MCG 1000 MCG: 1000 TAB at 09:22

## 2024-08-04 RX ADMIN — LORAZEPAM 1 MG: 1 TABLET ORAL at 21:21

## 2024-08-04 RX ADMIN — FAMOTIDINE 20 MG: 20 TABLET ORAL at 17:29

## 2024-08-04 RX ADMIN — ATORVASTATIN CALCIUM 10 MG: 10 TABLET, FILM COATED ORAL at 09:20

## 2024-08-04 RX ADMIN — SENNOSIDES AND DOCUSATE SODIUM 1 TABLET: 50; 8.6 TABLET ORAL at 21:21

## 2024-08-04 RX ADMIN — LORAZEPAM 0.5 MG: 0.5 TABLET ORAL at 14:22

## 2024-08-04 RX ADMIN — OLANZAPINE 5 MG: 5 TABLET, ORALLY DISINTEGRATING ORAL at 19:28

## 2024-08-04 RX ADMIN — MELATONIN TAB 3 MG 3 MG: 3 TAB at 21:21

## 2024-08-04 RX ADMIN — FAMOTIDINE 20 MG: 20 TABLET ORAL at 09:23

## 2024-08-04 RX ADMIN — INSULIN LISPRO 1 UNITS: 100 INJECTION, SOLUTION INTRAVENOUS; SUBCUTANEOUS at 21:22

## 2024-08-04 RX ADMIN — CARVEDILOL 6.25 MG: 6.25 TABLET, FILM COATED ORAL at 09:21

## 2024-08-04 RX ADMIN — OLANZAPINE 5 MG: 10 INJECTION, POWDER, FOR SOLUTION INTRAMUSCULAR at 03:54

## 2024-08-04 RX ADMIN — WATER 2.1 ML: 1 INJECTION, SOLUTION INTRAMUSCULAR; INTRAVENOUS; SUBCUTANEOUS at 03:58

## 2024-08-04 RX ADMIN — CARVEDILOL 6.25 MG: 6.25 TABLET, FILM COATED ORAL at 17:29

## 2024-08-04 RX ADMIN — INSULIN LISPRO 1 UNITS: 100 INJECTION, SOLUTION INTRAVENOUS; SUBCUTANEOUS at 11:52

## 2024-08-04 RX ADMIN — FLUOXETINE 20 MG: 20 SOLUTION ORAL at 09:24

## 2024-08-04 RX ADMIN — CLOZAPINE 150 MG: 100 TABLET ORAL at 09:22

## 2024-08-04 RX ADMIN — LORAZEPAM 1 MG: 1 TABLET ORAL at 09:25

## 2024-08-04 NOTE — PROGRESS NOTES
Progress Note - Behavioral Health   Meli Nowak 59 y.o. female MRN: [id number]  Unit/Bed#: [facility] 651-01 Encounter: [id number]    Patient was seen today for continuation of care, records reviewed and patient was discussed with the morning case review team.    Meli was seen today for psychiatric follow-up.  On assessment today, Meli was seen in the day room.  Less agitated at this time, patient noted to be internally preoccupied overnight and responding loudly.  As needed Zyprexa given and effective.  We will continue with up titration of Zyprexa nightly to 10 mg on 8/5/2024.  Weekly labs also to be obtained in the morning for continued management.  Patient continues to be disorganized and illogical with internal preoccupation and paranoia.  Tentative discharge ongoing.    Meli denies acute suicidal/self-harm ideation/intent/plan upon direct inquiry at this time.  Meli remains behaviorally appropriate, no agitation or aggression noted on exam or in report.  Impulse control is intact.  Meli remains adherent to her current psychotropic medication regimen and denies any side effects from medications, as well as none noted on exam.    Vitals:  Vitals:    08/04/24 0741   BP: 121/61   Pulse: 99   Resp: 17   Temp: 98 °F (36.7 °C)   SpO2: 92%       Laboratory Results:  I have personally reviewed all pertinent laboratory/tests results.    Psychiatric Review of Systems:  Behavior over the last 24 hours:  unchanged.   Sleep: needs improvement  Appetite: improving  Medication side effects: none  ROS: no complaints, denies shortness of breath or chest pain and all other systems are negative for acute changes    Mental Status Evaluation:  Appearance:  marginal hygiene, overweight, bizarre   Behavior:  bizarre, less agitated   Speech:  delayed, soft   Mood:  anxious, less irritable   Affect:  constricted   Thought Process:  linear   Thought Content:  paranoid ideation   Perceptual Disturbances: appears responding to internal  stimuli, appears preoccupied, talks to self at times   Risk Potential: Suicidal ideation - None  Homicidal ideation - None  Potential for aggression - No   Memory:  recent and remote memory: unable to assess due to lack of cooperation   Sensorium  person      Consciousness:  alert and awake   Attention: poor concentration and poor attention span   Insight:  limited   Judgment: limited   Gait/Station: needs assistive device   Motor Activity: no abnormal movements   Progress Toward Goals:   Meli is progressing towards goals of inpatient psychiatric treatment by continued medication compliance and is attending therapeutic modalities on the milieu. However, the patient continues to require inpatient psychiatric hospitalization for continued medication management and titration to optimize symptom reduction, improve sleep hygiene, and demonstrate adequate self-care.    Assessment & Plan   Principal Problem:    Schizoaffective disorder, bipolar type (HCC)  Active Problems:    Medical clearance for psychiatric admission    Type 2 diabetes mellitus (HCC)    Obesity    Tobacco abuse    Hyperlipidemia    Esophageal reflux    Essential (primary) hypertension    Vitamin B12 deficiency    Hypoxia    Ambulatory dysfunction      Recommended Treatment: Treatment plan and medication changes discussed and per the attending physician the plan is:    1.Continue with group therapy, milieu therapy and occupational therapy  2.Behavioral Health checks every 7 minutes  3.Continue frequent safety checks and vitals per unit protocol  4.Continue with SLIM medical management as indicated  5.Continue with current medication regimen  6.Will review labs in the a.m.  7.Disposition Planning: Discharge planning and efforts remain ongoing    Behavioral Health Medications: all current active meds have been reviewed and continue current psychiatric medications.  Current Facility-Administered Medications   Medication Dose Route Frequency Provider Last  Rate    acetaminophen  650 mg Oral Q4H PRN Marie Ziegler, CRNP      acetaminophen  650 mg Oral Q4H PRN Marie Ziegler, CRNP      acetaminophen  975 mg Oral Q6H PRN Marie Ziegler, CRNP      atorvastatin  10 mg Oral Daily Marie Ziegler, CRNP      bisacodyl  10 mg Rectal Daily PRN Marie Ziegler, CRNP      carvedilol  6.25 mg Oral BID With Meals Marie Ziegler, CRNP      cloNIDine  0.1 mg Transdermal Weekly Marie Ziegler, CRNP      cloZAPine  150 mg Oral Daily Dereje Banuelos MD      cyanocobalamin  1,000 mcg Oral Daily Marie Ziegler, CRNP      famotidine  20 mg Oral BID Shan Fitzgerald DO      FLUoxetine  20 mg Oral Daily Dereje Banuelos MD      fluticasone  1 puff Inhalation Daily Marie Ziegler, CRNP      hydrOXYzine HCL  25 mg Oral Q6H PRN Max 4/day Marie Ziegler, CRNP      hydrOXYzine HCL  50 mg Oral Q6H PRN Max 4/day Marie Ziegler, CRNP      insulin lispro  1-5 Units Subcutaneous 4x Daily (AC & HS) Marie Ziegler, CRNP      ipratropium-albuterol  3 mL Nebulization Q4H PRN Darin Lawton MD      LORazepam  0.5 mg Sublingual After Lunch Dereje Banuelos MD      Or    LORazepam  0.5 mg Intramuscular After Lunch Dereje Banuelos MD      LORazepam  1 mg Intramuscular Q6H PRN Max 3/day Marie Ziegler, CRNP      LORazepam  1 mg Oral BID Dereje Banuelos MD      Or    LORazepam  1 mg Intramuscular BID Dereje Banuelos MD      LORazepam  1 mg Oral Q6H PRN Max 3/day Marie Ziegler, CRNP      melatonin  3 mg Oral HS Marie Ziegler, CRNP      Multivitamin  15 mL Oral Daily Marie Ziegler, CRNP      nicotine  1 patch Transdermal Daily Marie Ziegler, CRNP      OLANZapine  5 mg Oral HS Dereje Banuelos MD      Or    OLANZapine  5 mg Intramuscular HS Dereje Banuelos MD      OLANZapine  2.5 mg Oral Q6H PRN Dereje Banuelos MD      Or    OLANZapine  2.5 mg Intramuscular Q6H PRN Dereje Banuelos MD      OLANZapine  5 mg Oral Q6H PRN Dereje Banuelos MD      Or    OLANZapine  5 mg Intramuscular Q6H PRN Dereje Banuelos MD       ondansetron  4 mg Oral Q6H PRN Darin Lawton MD      polyethylene glycol  17 g Oral Daily PRN JOSE ELIAS Figueroa      polyethylene glycol  17 g Oral Daily JOSE ELIAS Ortega      senna-docusate sodium  1 tablet Oral HS JOSE ELIAS Figueroa      traZODone  50 mg Oral HS PRN JOSE ELIAS Figueroa         Risks / Benefits of Treatment:  Patient is not likely to improve without medications and will require administration of injectable medications against she will for refusal of oral medications to assure safety of self and others    Counseling / Coordination of Care:  Patient's progress reviewed with nursing staff.  Medications, treatment progress and treatment plan reviewed with patient.  Supportive counseling provided to the patient.    Total floor/unit time spent today 25 minutes. Greater than 50% of total time was spent with the patient and / or family counseling and / or coordination of care. A description of the counseling / coordination of care: medication education, treatment plan, supportive therapy.    This note was completed in part utilizing Dragon dictation Software. Grammatical, translation, syntax errors, random word insertions, spelling mistakes, and incomplete sentences may be an occasional consequence of this system secondary to software limitations with voice recognition, ambient noise, and hardware issues. If you have any questions or concerns about the content, text, or information contained within the body of this dictation, please contact the provider for clarification

## 2024-08-04 NOTE — NURSING NOTE
Patient paranoid and irritable at times. Patient withdrawn to room, but comes out for meals.  Patient had 0 % for breakfast, spitting out oatmeal on tray. Patient appears to be responding to internal stimuli, but unable to specify what they are saying. Patient remains on continuous 1:1 for safety. Patient able and encouraged to notify staff of any needs. Safety checks ongoing.

## 2024-08-04 NOTE — PLAN OF CARE
Problem: Prexisting or High Potential for Compromised Skin Integrity  Goal: Skin integrity is maintained or improved  Description: INTERVENTIONS:  - Identify patients at risk for skin breakdown  - Assess and monitor skin integrity  - Assess and monitor nutrition and hydration status  - Monitor labs   - Assess for incontinence   - Turn and reposition patient  - Assist with mobility/ambulation  - Relieve pressure over bony prominences  - Avoid friction and shearing  - Provide appropriate hygiene as needed including keeping skin clean and dry  - Evaluate need for skin moisturizer/barrier cream  - Collaborate with interdisciplinary team   - Patient/family teaching  - Consider wound care consult   Outcome: Progressing     Problem: Ineffective Coping  Goal: Free from restraint events  Description: - Utilize least restrictive measures   - Provide behavioral interventions   - Redirect inappropriate behaviors   Outcome: Progressing     Problem: Risk for Self Injury/Neglect  Goal: Refrain from harming self  Description: Interventions:  - Monitor patient closely, per order  - Develop a trusting relationship  - Supervise medication ingestion, monitor effects and side effects   Outcome: Progressing     Problem: Potential for Falls  Goal: Patient will remain free of falls  Description: INTERVENTIONS:  - Educate patient on patient safety including physical limitations  - Instruct patient to call for assistance with activity   - Consult OT/PT to assist with strengthening/mobility   - Keep Call bell within reach  - Keep bed low and locked with side rails adjusted as appropriate  - Keep care items and personal belongings within reach  - Initiate and maintain comfort rounds  - Offer Toileting every 2 Hours, in advance of need  - Initiate/Maintain bed and chair alarm  - Obtain necessary fall risk management equipment: walker, wheelchair  - Apply yellow socks and bracelet for high fall risk patients  - Patient moved to room near  nurses station  Outcome: Progressing

## 2024-08-04 NOTE — NURSING NOTE
Patient refused taking scheduled HS medications including ativan orally so IM injection of Ativan 1 mg was administered at 2209 per order. Patient was paranoid , no compliant at the time.

## 2024-08-04 NOTE — NURSING NOTE
Zyprexa was effective for agitation, however, patient could not sleep moving in a bed. Patient refused using O2 this am.

## 2024-08-04 NOTE — NURSING NOTE
"Patient reported to her 1:1 staff that she was feeling anxious. Upon assessment, patient is diaphoretic, SOB, and restless. She stated \"Please help me. Get a doctor to help me right away. I'm afraid of the medicine. I'm afraid I'm going to die. I'm hallucinating.\" Patient reports seeing horror movies play on the window in her room and states \"I'm afraid they're going to be in my head forever.\" Patient encouraged to come out of her room and engage during a group but she refused stating \"I don't feel good.\" Patient provided with reassurance and support from staff. PRN Ativan 1 mg administered at 1722 for anxiety.   "

## 2024-08-04 NOTE — NURSING NOTE
Patient was restless pacing ponce way and  talking loudly. She was unable to sleep and noted to have hallucinations. Zyprexa 5 mg IM was administered to the right anterior thigh at 0354. Will continue to monitor.

## 2024-08-05 LAB
BASOPHILS # BLD AUTO: 0.06 THOUSANDS/ΜL (ref 0–0.1)
BASOPHILS NFR BLD AUTO: 1 % (ref 0–1)
CK SERPL-CCNC: 78 U/L (ref 26–192)
CLOZAPINE SERPL-MCNC: 164 NG/ML (ref 350–900)
CRP SERPL QL: 4.5 MG/L
EOSINOPHIL # BLD AUTO: 0.15 THOUSAND/ΜL (ref 0–0.61)
EOSINOPHIL NFR BLD AUTO: 2 % (ref 0–6)
ERYTHROCYTE [DISTWIDTH] IN BLOOD BY AUTOMATED COUNT: 15.8 % (ref 11.6–15.1)
GLUCOSE SERPL-MCNC: 105 MG/DL (ref 65–140)
GLUCOSE SERPL-MCNC: 106 MG/DL (ref 65–140)
GLUCOSE SERPL-MCNC: 113 MG/DL (ref 65–140)
GLUCOSE SERPL-MCNC: 98 MG/DL (ref 65–140)
HCT VFR BLD AUTO: 42.4 % (ref 34.8–46.1)
HGB BLD-MCNC: 13.2 G/DL (ref 11.5–15.4)
IMM GRANULOCYTES # BLD AUTO: 0.02 THOUSAND/UL (ref 0–0.2)
IMM GRANULOCYTES NFR BLD AUTO: 0 % (ref 0–2)
LYMPHOCYTES # BLD AUTO: 2.38 THOUSANDS/ΜL (ref 0.6–4.47)
LYMPHOCYTES NFR BLD AUTO: 34 % (ref 14–44)
MCH RBC QN AUTO: 29.5 PG (ref 26.8–34.3)
MCHC RBC AUTO-ENTMCNC: 31.1 G/DL (ref 31.4–37.4)
MCV RBC AUTO: 95 FL (ref 82–98)
MONOCYTES # BLD AUTO: 0.85 THOUSAND/ΜL (ref 0.17–1.22)
MONOCYTES NFR BLD AUTO: 12 % (ref 4–12)
NEUTROPHILS # BLD AUTO: 3.56 THOUSANDS/ΜL (ref 1.85–7.62)
NEUTS SEG NFR BLD AUTO: 51 % (ref 43–75)
NRBC BLD AUTO-RTO: 0 /100 WBCS
PLATELET # BLD AUTO: 263 THOUSANDS/UL (ref 149–390)
PMV BLD AUTO: 9.3 FL (ref 8.9–12.7)
RBC # BLD AUTO: 4.48 MILLION/UL (ref 3.81–5.12)
WBC # BLD AUTO: 7.02 THOUSAND/UL (ref 4.31–10.16)

## 2024-08-05 PROCEDURE — 85025 COMPLETE CBC W/AUTO DIFF WBC: CPT

## 2024-08-05 PROCEDURE — 99232 SBSQ HOSP IP/OBS MODERATE 35: CPT | Performed by: STUDENT IN AN ORGANIZED HEALTH CARE EDUCATION/TRAINING PROGRAM

## 2024-08-05 PROCEDURE — 86140 C-REACTIVE PROTEIN: CPT

## 2024-08-05 PROCEDURE — 93005 ELECTROCARDIOGRAM TRACING: CPT

## 2024-08-05 PROCEDURE — 80159 DRUG ASSAY CLOZAPINE: CPT | Performed by: STUDENT IN AN ORGANIZED HEALTH CARE EDUCATION/TRAINING PROGRAM

## 2024-08-05 PROCEDURE — 82948 REAGENT STRIP/BLOOD GLUCOSE: CPT

## 2024-08-05 PROCEDURE — 82550 ASSAY OF CK (CPK): CPT

## 2024-08-05 PROCEDURE — 97167 OT EVAL HIGH COMPLEX 60 MIN: CPT

## 2024-08-05 RX ORDER — WATER 10 ML/10ML
INJECTION INTRAMUSCULAR; INTRAVENOUS; SUBCUTANEOUS
Status: COMPLETED
Start: 2024-08-05 | End: 2024-08-05

## 2024-08-05 RX ORDER — OLANZAPINE 10 MG/2ML
5 INJECTION, POWDER, FOR SOLUTION INTRAMUSCULAR
Status: DISCONTINUED | OUTPATIENT
Start: 2024-08-05 | End: 2024-08-13

## 2024-08-05 RX ORDER — CLOZAPINE 200 MG/1
200 TABLET ORAL DAILY
Status: DISCONTINUED | OUTPATIENT
Start: 2024-08-06 | End: 2024-08-07

## 2024-08-05 RX ORDER — OLANZAPINE 10 MG/1
10 TABLET, ORALLY DISINTEGRATING ORAL
Status: DISCONTINUED | OUTPATIENT
Start: 2024-08-05 | End: 2024-08-13

## 2024-08-05 RX ORDER — FLUOXETINE 20 MG/5ML
40 SOLUTION ORAL DAILY
Status: DISCONTINUED | OUTPATIENT
Start: 2024-08-06 | End: 2024-08-29

## 2024-08-05 RX ADMIN — SENNOSIDES AND DOCUSATE SODIUM 1 TABLET: 50; 8.6 TABLET ORAL at 21:09

## 2024-08-05 RX ADMIN — LORAZEPAM 1 MG: 2 INJECTION INTRAMUSCULAR; INTRAVENOUS at 13:10

## 2024-08-05 RX ADMIN — LORAZEPAM 1 MG: 1 TABLET ORAL at 13:49

## 2024-08-05 RX ADMIN — WATER 2.1 ML: 1 INJECTION, SOLUTION INTRAMUSCULAR; INTRAVENOUS; SUBCUTANEOUS at 20:14

## 2024-08-05 RX ADMIN — OLANZAPINE 5 MG: 10 INJECTION, POWDER, FOR SOLUTION INTRAMUSCULAR at 10:40

## 2024-08-05 RX ADMIN — OLANZAPINE 5 MG: 10 INJECTION, POWDER, FOR SOLUTION INTRAMUSCULAR at 20:14

## 2024-08-05 RX ADMIN — MELATONIN TAB 3 MG 3 MG: 3 TAB at 21:09

## 2024-08-05 RX ADMIN — LORAZEPAM 1 MG: 1 TABLET ORAL at 21:09

## 2024-08-05 RX ADMIN — FAMOTIDINE 20 MG: 20 TABLET ORAL at 17:08

## 2024-08-05 RX ADMIN — FAMOTIDINE 20 MG: 20 TABLET ORAL at 13:49

## 2024-08-05 RX ADMIN — LORAZEPAM 0.5 MG: 0.5 TABLET ORAL at 16:42

## 2024-08-05 RX ADMIN — CYANOCOBALAMIN TAB 1000 MCG 1000 MCG: 1000 TAB at 13:49

## 2024-08-05 RX ADMIN — ATORVASTATIN CALCIUM 10 MG: 10 TABLET, FILM COATED ORAL at 13:49

## 2024-08-05 RX ADMIN — CARVEDILOL 6.25 MG: 6.25 TABLET, FILM COATED ORAL at 17:08

## 2024-08-05 RX ADMIN — WATER 2.1 ML: 1 INJECTION, SOLUTION INTRAMUSCULAR; INTRAVENOUS; SUBCUTANEOUS at 10:46

## 2024-08-05 RX ADMIN — CLOZAPINE 150 MG: 100 TABLET ORAL at 13:49

## 2024-08-05 NOTE — NURSING NOTE
Patient refusing morning medications. She is lying in bed with her face in her pillows and non-interactive with staff. Patient assessed by provider. Provider advised to give patient IM Zyprexa. PRN Zyprexa 5 mg IM in left outer thigh at 1040.

## 2024-08-05 NOTE — NURSING NOTE
PRN Zyprexa ineffective. Patient remains in bed with her face in a pillow. She continues to be paranoid about medications and refusing to take her morning meds.

## 2024-08-05 NOTE — PROGRESS NOTES
Progress Note - Behavioral Health   Meli Nowak 59 y.o. female MRN: [id number]  Unit/Bed#: [facility] 651-01 Encounter: [id number]    Patient was seen today for continuation of care, records reviewed and patient was discussed with the morning case review team.  Zyprexa 5 mg IM given at 10:40 AM due to ongoing agitation, patient also required IM Ativan which was moderately effective.    Meli was seen today for psychiatric follow-up.  On assessment today, Meli was seen in her room.  Currently lying sideways in the fetal position, patient is unresponsive to this writer and is noted to be mumbling to herself.  Clozaril refused this morning, patient finally agreeable at 1300 post constant encouragement from staff and this writer.  MOO still implemented with no IV administration required over the past 24 hours.  Clozaril level ordered due to intermittent refusal and ongoing drug therapy.  Clozaril to be increased to 200 mg daily due to ongoing disorganization and illogical behavior including paranoia, internal preoccupation and often bizarre delusions.  One-to-one still at the bedside due to safety concerns.  Patient continues to need inpatient hospitalization due to ongoing medication management and lack of improvement in symptoms.    Meli denies acute suicidal/self-harm ideation/intent/plan upon direct inquiry at this time.  Meli remains behaviorally appropriate, no agitation or aggression noted on exam or in report. Impulse control is impaired.  Meli remains adherent to her current psychotropic medication regimen and denies any side effects from medications, as well as none noted on exam.    Vitals:  Vitals:    08/05/24 0742   BP: 128/67   Pulse: 93   Resp: 16   Temp: 97.6 °F (36.4 °C)   SpO2: 91%       Laboratory Results:  I have personally reviewed all pertinent laboratory/tests results.    Psychiatric Review of Systems:  Behavior over the last 24 hours:  unchanged.   Sleep: good  Appetite: good  Medication side  effects: none  ROS: no complaints, denies shortness of breath or chest pain and all other systems are negative for acute changes    Mental Status Evaluation:  Appearance:  disheveled, overweight, currently lying topless in the fetal position   Behavior:  bizarre   Speech:  normal rate and volume   Mood:  anxious, still at times irritable   Affect:  constricted   Thought Process:  disorganized, illogical   Thought Content:  Quaker and somatic preoccupation, paranoid ideation   Perceptual Disturbances: appears responding to internal stimuli, appears preoccupied, talks to self at times   Risk Potential: Suicidal ideation - None  Homicidal ideation - None  Potential for aggression - No   Memory:  recent and remote memory: unable to assess due to lack of cooperation   Sensorium  person and place      Consciousness:  alert and awake   Attention: decreased concentration and decreased attention span   Insight:  limited   Judgment: limited   Gait/Station: needs assistive device   Motor Activity: no abnormal movements   Progress Toward Goals:   Meli is progressing towards goals of inpatient psychiatric treatment by continued medication compliance and is attending therapeutic modalities on the milieu. However, the patient continues to require inpatient psychiatric hospitalization for continued medication management and titration to optimize symptom reduction, improve sleep hygiene, and demonstrate adequate self-care.    Assessment & Plan   Principal Problem:    Schizoaffective disorder, bipolar type (HCC)  Active Problems:    Medical clearance for psychiatric admission    Type 2 diabetes mellitus (HCC)    Obesity    Tobacco abuse    Hyperlipidemia    Esophageal reflux    Essential (primary) hypertension    Vitamin B12 deficiency    Hypoxia    Ambulatory dysfunction      Recommended Treatment: Treatment plan and medication changes discussed and per the attending physician the plan is:    1.Continue with group therapy, milieu  therapy and occupational therapy  2.Behavioral Health checks every 7 minutes  3.Continue frequent safety checks and vitals per unit protocol  4.Continue with SLIM medical management as indicated  5.Continue with current medication regimen  6.Will review labs in the a.m.  7.Disposition Planning: Discharge planning and efforts remain ongoing    Behavioral Health Medications: all current active meds have been reviewed and continue current psychiatric medications.  Current Facility-Administered Medications   Medication Dose Route Frequency Provider Last Rate    acetaminophen  650 mg Oral Q4H PRN JOSE ELIAS Figueroa      acetaminophen  650 mg Oral Q4H PRN Marie Ziegler, JOSE ELIAS      acetaminophen  975 mg Oral Q6H PRN JOSE ELIAS Figueroa      atorvastatin  10 mg Oral Daily JOSE ELIAS Figueroa      bisacodyl  10 mg Rectal Daily PRN JOSE ELIAS Figueroa      carvedilol  6.25 mg Oral BID With Meals JOSE ELIAS Figueroa      cloNIDine  0.1 mg Transdermal Weekly JOSE ELIAS Figueroa      cloZAPine  150 mg Oral Daily Dereje Banuelos MD      cyanocobalamin  1,000 mcg Oral Daily JOSE ELIAS Figueroa      famotidine  20 mg Oral BID Shan Fitzgerald DO      [START ON 8/6/2024] FLUoxetine  40 mg Oral Daily Dereje Banuelos MD      fluticasone  1 puff Inhalation Daily JOSE ELIAS Figueroa      hydrOXYzine HCL  25 mg Oral Q6H PRN Max 4/day JOSE ELIAS Figueroa      hydrOXYzine HCL  50 mg Oral Q6H PRN Max 4/day JOSE ELIAS Figueroa      insulin lispro  1-5 Units Subcutaneous 4x Daily (AC & HS) JOSE ELIAS Figueroa      ipratropium-albuterol  3 mL Nebulization Q4H PRN Darin Lawton MD      LORazepam  0.5 mg Sublingual After Lunch Dereje Banuelos MD      Or    LORazepam  0.5 mg Intramuscular After Lunch Dereje Banuelos MD      LORazepam  1 mg Intramuscular Q6H PRN Max 3/day JOSE ELIAS Figueroa      LORazepam  1 mg Oral BID Dereje Banuelos MD      Or    LORazepam  1 mg Intramuscular BID Dereje Baneulos MD      LORazepam  1 mg  Oral Q6H PRN Max 3/day JOSE ELIAS Figueroa      melatonin  3 mg Oral HS JOSE ELIAS Figueroa      Multivitamin  15 mL Oral Daily JOSE ELIAS Figueroa      nicotine  1 patch Transdermal Daily JOSE ELIAS Figueroa      OLANZapine  5 mg Oral HS Dereje Banuelos MD      Or    OLANZapine  5 mg Intramuscular HS Dereje Banuelos MD      OLANZapine  2.5 mg Oral Q6H PRN Dereje Banuelos MD      Or    OLANZapine  2.5 mg Intramuscular Q6H PRN Dereje Banuelos MD      OLANZapine  5 mg Oral Q6H PRN Dereje Banuelos MD      Or    OLANZapine  5 mg Intramuscular Q6H PRN Dereje Banuelos MD      ondansetron  4 mg Oral Q6H PRN Darin Lawton MD      polyethylene glycol  17 g Oral Daily PRN JOSE ELIAS Figueroa      polyethylene glycol  17 g Oral Daily JOSE ELIAS Ortega      senna-docusate sodium  1 tablet Oral HS JOSE ELIAS Figueroa      traZODone  50 mg Oral HS PRN JOSE ELIAS Figueroa         Risks / Benefits of Treatment:  Risks, benefits, and possible side effects of medications explained to patient and patient verbalizes understanding and agreement for treatment.    Counseling / Coordination of Care:  Patient's progress reviewed with nursing staff.  Medications, treatment progress and treatment plan reviewed with patient.  Supportive counseling provided to the patient.    Total floor/unit time spent today 25 minutes. Greater than 50% of total time was spent with the patient and / or family counseling and / or coordination of care. A description of the counseling / coordination of care: medication education, treatment plan, supportive therapy.    This note was completed in part utilizing Dragon dictation Software. Grammatical, translation, syntax errors, random word insertions, spelling mistakes, and incomplete sentences may be an occasional consequence of this system secondary to software limitations with voice recognition, ambient noise, and hardware issues. If you have any questions or concerns about the content,  text, or information contained within the body of this dictation, please contact the provider for clarification

## 2024-08-05 NOTE — CASE MANAGEMENT
[facility] on the unit to meet with the patient to do her level of care assessment. CM provided the  with additional information as requested.

## 2024-08-05 NOTE — TREATMENT TEAM
"   08/05/24 1310   Powell Anxiety Scale   Anxious Mood 4   Tension 4   Fears 4   Insomnia 0   Intellectual 4   Depressed Mood 4   Somatic Complaints: Muscular 1   Somatic Complaints: Sensory 0   Cardiovascular Symptoms 0   Respiratory Symptoms 0   Gastrointestinal Symptoms 0   Genitourinary Symptoms 0   Autonomic Symptoms 4   Behavior at Interview 4   Powell Anxiety Score 29     Patient reports to be anxious to 1:1 staff. On assessment patient is diaphoretic , tossing in bed attempting to remove all clothing. Patient states \" I need a doctor please, help me.\" Patient given and accepted PRN Ativan for anxiety.   "

## 2024-08-05 NOTE — NURSING NOTE
"Patient refusing all medications this morning, placing her head in the pillow, stating \" NO , I wont take it, I can't take it, I can't.\" Writer heavily encouraged patient with no response. Patient yelling \" please just leave me alone.\"  Patient appears to be responding to internal stimuli, mumbling to herself. When asked if she is hearing voices , patient shakes head yes. Per provider was told to hold medications and attempt to see if patient would accept at a later time. With heavy encouragement from this writer and provider patient accepted medications crushed in yogurt.   "

## 2024-08-05 NOTE — TREATMENT TEAM
08/05/24 0748   Team Meeting   Meeting Type Daily Rounds   Initial Conference Date 08/05/24   Team Members Present   Team Members Present Physician;Nurse;   Physician Team Member Marie Agrawal   Nursing Team Member Clarissa   Care Management Team Member Mercedes   Patient/Family Present   Patient Present No   Patient's Family Present No     Patient is compliant with medications, PRN Ativan 1mg given yesterday for agitation was effective. Zypexa to be increased to 10mg; Clozaril to be increased. Patient continues with AH, more redirectable and more open to reassurance. CM sent [facility] the patient's level of care assessment packet for post acute rehab services. No discharge date pending at this time.

## 2024-08-06 LAB
ALBUMIN SERPL BCG-MCNC: 3.6 G/DL (ref 3.5–5)
ALP SERPL-CCNC: 82 U/L (ref 34–104)
ALT SERPL W P-5'-P-CCNC: 26 U/L (ref 7–52)
ANION GAP SERPL CALCULATED.3IONS-SCNC: 10 MMOL/L (ref 4–13)
AST SERPL W P-5'-P-CCNC: 28 U/L (ref 13–39)
ATRIAL RATE: 79 BPM
BASOPHILS # BLD AUTO: 0.08 THOUSANDS/ΜL (ref 0–0.1)
BASOPHILS NFR BLD AUTO: 1 % (ref 0–1)
BILIRUB SERPL-MCNC: 0.54 MG/DL (ref 0.2–1)
BUN SERPL-MCNC: 10 MG/DL (ref 5–25)
CALCIUM SERPL-MCNC: 9.2 MG/DL (ref 8.4–10.2)
CHLORIDE SERPL-SCNC: 103 MMOL/L (ref 96–108)
CO2 SERPL-SCNC: 27 MMOL/L (ref 21–32)
CREAT SERPL-MCNC: 0.93 MG/DL (ref 0.6–1.3)
EOSINOPHIL # BLD AUTO: 0 THOUSAND/ΜL (ref 0–0.61)
EOSINOPHIL NFR BLD AUTO: 0 % (ref 0–6)
ERYTHROCYTE [DISTWIDTH] IN BLOOD BY AUTOMATED COUNT: 15.9 % (ref 11.6–15.1)
GFR SERPL CREATININE-BSD FRML MDRD: 68 ML/MIN/1.73SQ M
GLUCOSE P FAST SERPL-MCNC: 107 MG/DL (ref 65–99)
GLUCOSE SERPL-MCNC: 103 MG/DL (ref 65–140)
GLUCOSE SERPL-MCNC: 107 MG/DL (ref 65–140)
GLUCOSE SERPL-MCNC: 110 MG/DL (ref 65–140)
GLUCOSE SERPL-MCNC: 119 MG/DL (ref 65–140)
GLUCOSE SERPL-MCNC: 99 MG/DL (ref 65–140)
HCT VFR BLD AUTO: 47 % (ref 34.8–46.1)
HGB BLD-MCNC: 14.7 G/DL (ref 11.5–15.4)
IMM GRANULOCYTES # BLD AUTO: 0.02 THOUSAND/UL (ref 0–0.2)
IMM GRANULOCYTES NFR BLD AUTO: 0 % (ref 0–2)
LYMPHOCYTES # BLD AUTO: 2.38 THOUSANDS/ΜL (ref 0.6–4.47)
LYMPHOCYTES NFR BLD AUTO: 35 % (ref 14–44)
MAGNESIUM SERPL-MCNC: 2.1 MG/DL (ref 1.9–2.7)
MCH RBC QN AUTO: 29.7 PG (ref 26.8–34.3)
MCHC RBC AUTO-ENTMCNC: 31.3 G/DL (ref 31.4–37.4)
MCV RBC AUTO: 95 FL (ref 82–98)
MONOCYTES # BLD AUTO: 0.77 THOUSAND/ΜL (ref 0.17–1.22)
MONOCYTES NFR BLD AUTO: 11 % (ref 4–12)
NEUTROPHILS # BLD AUTO: 3.5 THOUSANDS/ΜL (ref 1.85–7.62)
NEUTS SEG NFR BLD AUTO: 53 % (ref 43–75)
NRBC BLD AUTO-RTO: 0 /100 WBCS
P AXIS: 30 DEGREES
PHOSPHATE SERPL-MCNC: 4.5 MG/DL (ref 2.7–4.5)
PLATELET # BLD AUTO: 310 THOUSANDS/UL (ref 149–390)
PMV BLD AUTO: 9.2 FL (ref 8.9–12.7)
POTASSIUM SERPL-SCNC: 4.2 MMOL/L (ref 3.5–5.3)
PR INTERVAL: 134 MS
PROT SERPL-MCNC: 6.6 G/DL (ref 6.4–8.4)
QRS AXIS: 42 DEGREES
QRSD INTERVAL: 98 MS
QT INTERVAL: 372 MS
QTC INTERVAL: 426 MS
RBC # BLD AUTO: 4.95 MILLION/UL (ref 3.81–5.12)
SODIUM SERPL-SCNC: 140 MMOL/L (ref 135–147)
T WAVE AXIS: 65 DEGREES
VENTRICULAR RATE: 79 BPM
WBC # BLD AUTO: 6.75 THOUSAND/UL (ref 4.31–10.16)

## 2024-08-06 PROCEDURE — 80053 COMPREHEN METABOLIC PANEL: CPT | Performed by: NURSE PRACTITIONER

## 2024-08-06 PROCEDURE — 93010 ELECTROCARDIOGRAM REPORT: CPT | Performed by: INTERNAL MEDICINE

## 2024-08-06 PROCEDURE — 83735 ASSAY OF MAGNESIUM: CPT | Performed by: NURSE PRACTITIONER

## 2024-08-06 PROCEDURE — 85025 COMPLETE CBC W/AUTO DIFF WBC: CPT | Performed by: NURSE PRACTITIONER

## 2024-08-06 PROCEDURE — 84100 ASSAY OF PHOSPHORUS: CPT | Performed by: NURSE PRACTITIONER

## 2024-08-06 PROCEDURE — 99232 SBSQ HOSP IP/OBS MODERATE 35: CPT | Performed by: STUDENT IN AN ORGANIZED HEALTH CARE EDUCATION/TRAINING PROGRAM

## 2024-08-06 PROCEDURE — 82948 REAGENT STRIP/BLOOD GLUCOSE: CPT

## 2024-08-06 RX ORDER — WATER 10 ML/10ML
INJECTION INTRAMUSCULAR; INTRAVENOUS; SUBCUTANEOUS
Status: COMPLETED
Start: 2024-08-06 | End: 2024-08-06

## 2024-08-06 RX ADMIN — FAMOTIDINE 20 MG: 20 TABLET ORAL at 08:17

## 2024-08-06 RX ADMIN — FAMOTIDINE 20 MG: 20 TABLET ORAL at 17:28

## 2024-08-06 RX ADMIN — POLYETHYLENE GLYCOL 3350 17 G: 17 POWDER, FOR SOLUTION ORAL at 08:17

## 2024-08-06 RX ADMIN — CYANOCOBALAMIN TAB 1000 MCG 1000 MCG: 1000 TAB at 08:17

## 2024-08-06 RX ADMIN — ATORVASTATIN CALCIUM 10 MG: 10 TABLET, FILM COATED ORAL at 08:17

## 2024-08-06 RX ADMIN — FLUOXETINE 40 MG: 20 SOLUTION ORAL at 08:17

## 2024-08-06 RX ADMIN — OLANZAPINE 5 MG: 10 INJECTION, POWDER, FOR SOLUTION INTRAMUSCULAR at 22:05

## 2024-08-06 RX ADMIN — WATER: 1 INJECTION INTRAMUSCULAR; INTRAVENOUS; SUBCUTANEOUS at 22:05

## 2024-08-06 RX ADMIN — LORAZEPAM 1 MG: 1 TABLET ORAL at 08:17

## 2024-08-06 RX ADMIN — LORAZEPAM 0.5 MG: 0.5 TABLET ORAL at 13:04

## 2024-08-06 RX ADMIN — CARVEDILOL 6.25 MG: 6.25 TABLET, FILM COATED ORAL at 08:17

## 2024-08-06 RX ADMIN — CLOZAPINE 200 MG: 200 TABLET ORAL at 08:17

## 2024-08-06 RX ADMIN — CARVEDILOL 6.25 MG: 6.25 TABLET, FILM COATED ORAL at 17:28

## 2024-08-06 RX ADMIN — LORAZEPAM 1 MG: 2 INJECTION INTRAMUSCULAR; INTRAVENOUS at 21:44

## 2024-08-06 NOTE — PROGRESS NOTES
"Progress Note - Behavioral Health   Meli Nowak 59 y.o. female MRN: [id number]  Unit/Bed#: [facility] 651-01 Encounter: [id number]    Patient was seen today for continuation of care, records reviewed and patient was discussed with the morning case review team.    Meli was seen today for psychiatric follow-up.  On assessment today, Meli was more cooperative.  Compliant with medication administration this morning, patient irritable overnight requiring IM Zyprexa but is noted to be less agitated this morning.  Currently sitting with her one-to-one, patient is coloring and singing at this time.  Stating that \" we need to get the program going\", patient nods her head \"yes\" when asked about taking medication.  Clozaril level obtained yesterday at 164, medication up titrated to 200 mg daily to manage disorganization as well as illogical behavior.  Zyprexa also up titrated to 10 mg nightly, patient tolerating the use of 2 antipsychotics with no EPS symptoms at this time.  Medications utilized due to multiple failures of monotherapy, we will proceed with off titration of Zyprexa after improvement of symptoms as medication is being utilized to assist with improving insight and judgment/paranoid behavior.  No other changes to be made at this time.  We will continue with medication management as patient is slowly responsive to adjustments.  Tentative discharge ongoing    Meli denies acute suicidal/self-harm ideation/intent/plan upon direct inquiry at this time.  Meli remains behaviorally appropriate, no agitation or aggression noted on exam or in report. Impulse control is improving.  Meli remains adherent to her current psychotropic medication regimen and denies any side effects from medications, as well as none noted on exam.    Vitals:  Vitals:    08/06/24 0733   BP: 126/60   Pulse: 78   Resp: 16   Temp: (!) 97.4 °F (36.3 °C)   SpO2: 93%       Laboratory Results:  I have personally reviewed all pertinent laboratory/tests " results.    Psychiatric Review of Systems:  Behavior over the last 24 hours:  unchanged.   Sleep: Slowly improving  Appetite: Slowly improving  Medication side effects: none  ROS: no complaints, denies shortness of breath or chest pain and all other systems are negative for acute changes    Mental Status Evaluation:  Appearance:  disheveled, dressed in hospital attire, overweight   Behavior:  cooperative, bizarre, guarded   Speech:  normal rate and volume   Mood:  anxious, less irritable   Affect:  constricted   Thought Process:  disorganized, illogical   Thought Content:  paranoid ideation   Perceptual Disturbances: appears preoccupied, talks to self at times, denies when asked, but talks to self at times   Risk Potential: Suicidal ideation - None at present  Homicidal ideation - None  Potential for aggression - No   Memory:  recent and remote memory: unable to assess due to lack of cooperation   Sensorium  person      Consciousness:  alert and awake   Attention: attention span and concentration are age appropriate   Insight:  limited   Judgment: limited   Gait/Station: needs assistive device   Motor Activity: no abnormal movements   Progress Toward Goals:   Meli is progressing towards goals of inpatient psychiatric treatment by continued medication compliance and is attending therapeutic modalities on the milieu. However, the patient continues to require inpatient psychiatric hospitalization for continued medication management and titration to optimize symptom reduction, improve sleep hygiene, and demonstrate adequate self-care.    Assessment & Plan   Principal Problem:    Schizoaffective disorder, bipolar type (HCC)  Active Problems:    Medical clearance for psychiatric admission    Type 2 diabetes mellitus (HCC)    Obesity    Tobacco abuse    Hyperlipidemia    Esophageal reflux    Essential (primary) hypertension    Vitamin B12 deficiency    Hypoxia    Ambulatory dysfunction      Recommended Treatment: Treatment  plan and medication changes discussed and per the attending physician the plan is:    1.Continue with group therapy, milieu therapy and occupational therapy  2.Behavioral Health checks every 7 minutes  3.Continue frequent safety checks and vitals per unit protocol  4.Continue with SLIM medical management as indicated  5.Continue with current medication regimen  6.Will review labs in the a.m.  7.Disposition Planning: Discharge planning and efforts remain ongoing    Behavioral Health Medications: all current active meds have been reviewed and continue current psychiatric medications.  Current Facility-Administered Medications   Medication Dose Route Frequency Provider Last Rate    acetaminophen  650 mg Oral Q4H PRN Marie Ziegler, CRNP      acetaminophen  650 mg Oral Q4H PRN Marie Ziegler, CHRISTOPHERNP      acetaminophen  975 mg Oral Q6H PRN Marie Ziegler, JOSE ELIAS      atorvastatin  10 mg Oral Daily Marie Ziegler, JOSE ELIAS      bisacodyl  10 mg Rectal Daily PRN Marie Ziegler, JOSE ELIAS      carvedilol  6.25 mg Oral BID With Meals JOSE ELIAS Figueroa      cloNIDine  0.1 mg Transdermal Weekly Marie Ziegler, JOSE ELIAS      cloZAPine  200 mg Oral Daily Marie Ziegler, JOSE ELIAS      cyanocobalamin  1,000 mcg Oral Daily Marie Ziegler, JOSE ELIAS      famotidine  20 mg Oral BID Shan Fitzgerald DO      FLUoxetine  40 mg Oral Daily Dereje Banuelos MD      fluticasone  1 puff Inhalation Daily JOSE ELIAS Figueroa      hydrOXYzine HCL  25 mg Oral Q6H PRN Max 4/day Marie Ziegler, JOSE ELIAS      hydrOXYzine HCL  50 mg Oral Q6H PRN Max 4/day JOSE ELIAS Figueroa      insulin lispro  1-5 Units Subcutaneous 4x Daily (AC & HS) JOSE ELIAS Figueroa      ipratropium-albuterol  3 mL Nebulization Q4H PRN Darin Lawton MD      LORazepam  0.5 mg Sublingual After Lunch Dereje Banuelos MD      Or    LORazepam  0.5 mg Intramuscular After Lunch Dereje Banuelos MD      LORazepam  1 mg Intramuscular Q6H PRN Max 3/day JOSE ELIAS Figueroa      LORazepam  1 mg  Oral BID Dereje Banuelos MD      Or    LORazepam  1 mg Intramuscular BID Dereje Banuelos MD      LORazepam  1 mg Oral Q6H PRN Max 3/day JOSE ELIAS Figueroa      melatonin  3 mg Oral HS JOSE ELIAS Figueroa      Multivitamin  15 mL Oral Daily JOSE ELIAS Figueroa      nicotine  1 patch Transdermal Daily JOSE ELIAS Figueroa      OLANZapine  10 mg Oral HS JOSE ELIAS Figueroa      Or    OLANZapine  5 mg Intramuscular HS JOSE ELIAS Figueroa      OLANZapine  2.5 mg Oral Q6H PRN Dereje Banuelos MD      Or    OLANZapine  2.5 mg Intramuscular Q6H PRN Dereje Banuelos MD      OLANZapine  5 mg Oral Q6H PRN Dereje Banuelos MD      Or    OLANZapine  5 mg Intramuscular Q6H PRN Dereje Banuelos MD      ondansetron  4 mg Oral Q6H PRN Darin Lawton MD      polyethylene glycol  17 g Oral Daily PRN JOSE ELIAS Figueroa      polyethylene glycol  17 g Oral Daily JOSE ELIAS Ortega      senna-docusate sodium  1 tablet Oral HS JOSE ELIAS Figueroa      traZODone  50 mg Oral HS PRN JOSE ELIAS Figueroa         Risks / Benefits of Treatment:  Risks, benefits, and possible side effects of medications explained to patient and patient verbalizes understanding and agreement for treatment.    Counseling / Coordination of Care:  Patient's progress reviewed with nursing staff.  Medications, treatment progress and treatment plan reviewed with patient.  Supportive counseling provided to the patient.    Total floor/unit time spent today 25 minutes. Greater than 50% of total time was spent with the patient and / or family counseling and / or coordination of care. A description of the counseling / coordination of care: medication education, treatment plan, supportive therapy.    This note was completed in part utilizing Dragon dictation Software. Grammatical, translation, syntax errors, random word insertions, spelling mistakes, and incomplete sentences may be an occasional consequence of this system secondary to software limitations  with voice recognition, ambient noise, and hardware issues. If you have any questions or concerns about the content, text, or information contained within the body of this dictation, please contact the provider for clarification

## 2024-08-06 NOTE — NURSING NOTE
Pt refused am nicotine patch, inhaler and MVI, otherwise med-compliant.  Appetite fair.  VSS.  Pt walked with staff in halls several times with steady gait.   at 1100.  Monitored on Continuous Observation for safety and support.

## 2024-08-06 NOTE — PLAN OF CARE
Problem: Prexisting or High Potential for Compromised Skin Integrity  Goal: Skin integrity is maintained or improved  Description: INTERVENTIONS:  - Identify patients at risk for skin breakdown  - Assess and monitor skin integrity  - Assess and monitor nutrition and hydration status  - Monitor labs   - Assess for incontinence   - Turn and reposition patient  - Assist with mobility/ambulation  - Relieve pressure over bony prominences  - Avoid friction and shearing  - Provide appropriate hygiene as needed including keeping skin clean and dry  - Evaluate need for skin moisturizer/barrier cream  - Collaborate with interdisciplinary team   - Patient/family teaching  - Consider wound care consult   Outcome: Progressing     Problem: Alteration in Thoughts and Perception  Goal: Treatment Goal: Gain control of psychotic behaviors/thinking, reduce/eliminate presenting symptoms and demonstrate improved reality functioning upon discharge  Outcome: Progressing  Goal: Verbalize thoughts and feelings  Description: Interventions:  - Promote a nonjudgmental and trusting relationship with the patient through active listening and therapeutic communication  - Assess patient's level of functioning, behavior and potential for risk  - Engage patient in 1 on 1 interactions  - Encourage patient to express fears, feelings, frustrations, and discuss symptoms    - [place] patient to reality, help patient recognize reality-based thinking   - Administer medications as ordered and assess for potential side effects  - Provide the patient education related to the signs and symptoms of the illness and desired effects of prescribed medications  Outcome: Progressing  Goal: Refrain from acting on delusional thinking/internal stimuli  Description: Interventions:  - Monitor patient closely, per order   - Utilize least restrictive measures   - Set reasonable limits, give positive feedback for acceptable   - Administer medications as ordered and monitor  of potential side effects  Outcome: Progressing  Goal: Agree to be compliant with medication regime, as prescribed and report medication side effects  Description: Interventions:  - Offer appropriate PRN medication and supervise ingestion; conduct AIMS, as needed   Outcome: Progressing  Goal: Attend and participate in unit activities, including therapeutic, recreational, and educational groups  Description: Interventions:  -Encourage Visitation and family involvement in care  Outcome: Progressing  Goal: Recognize dysfunctional thoughts, communicate reality-based thoughts at the time of discharge  Description: Interventions:  - Provide medication and psycho-education to assist patient in compliance and developing insight into his/her illness   Outcome: Progressing  Goal: Complete daily ADLs, including personal hygiene independently, as able  Description: Interventions:  - Observe, teach, and assist patient with ADLS  - Monitor and promote a balance of rest/activity, with adequate nutrition and elimination   Outcome: Progressing     Problem: Risk for Self Injury/Neglect  Goal: Treatment Goal: Remain safe during length of stay, learn and adopt new coping skills, and be free of self-injurious ideation, impulses and acts at the time of discharge  Outcome: Progressing  Goal: Verbalize thoughts and feelings  Description: Interventions:  - Assess and re-assess patient's lethality and potential for self-injury  - Engage patient in 1:1 interactions, daily, for a minimum of 15 minutes  - Encourage patient to express feelings, fears, frustrations, hopes  - Establish rapport/trust with patient   Outcome: Progressing  Goal: Refrain from harming self  Description: Interventions:  - Monitor patient closely, per order  - Develop a trusting relationship  - Supervise medication ingestion, monitor effects and side effects   Outcome: Progressing  Goal: Attend and participate in unit activities, including therapeutic, recreational, and  educational groups  Description: Interventions:  - Provide therapeutic and educational activities daily, encourage attendance and participation, and document same in the medical record  - Obtain collateral information, encourage visitation and family involvement in care   Outcome: Progressing  Goal: Recognize maladaptive responses and adopt new coping mechanisms  Outcome: Progressing     Problem: Depression  Goal: Treatment Goal: Demonstrate behavioral control of depressive symptoms, verbalize feelings of improved mood/affect, and adopt new coping skills prior to discharge  Outcome: Progressing  Goal: Verbalize thoughts and feelings  Description: Interventions:  - Assess and re-assess patient's level of risk   - Engage patient in 1:1 interactions, daily, for a minimum of 15 minutes   - Encourage patient to express feelings, fears, frustrations, hopes   Outcome: Progressing  Goal: Refrain from isolation  Description: Interventions:  - Develop a trusting relationship   - Encourage socialization   Outcome: Progressing  Goal: Refrain from self-neglect  Outcome: Progressing  Goal: Attend and participate in unit activities, including therapeutic, recreational, and educational groups  Description: Interventions:  - Provide therapeutic and educational activities daily, encourage attendance and participation, and document same in the medical record   Outcome: Progressing

## 2024-08-06 NOTE — OCCUPATIONAL THERAPY NOTE
Occupational Therapy Evaluation     Patient Name: Meli Nowak  Today's Date: 2024  Problem List  Principal Problem:    Schizoaffective disorder, bipolar type (HCC)  Active Problems:    Medical clearance for psychiatric admission    Type 2 diabetes mellitus (HCC)    Obesity    Tobacco abuse    Hyperlipidemia    Esophageal reflux    Essential (primary) hypertension    Vitamin B12 deficiency    Hypoxia    Ambulatory dysfunction    Past Medical History  Past Medical History:   Diagnosis Date    Cognitive impairment     Diabetes mellitus (HCC)     Essential (primary) hypertension     Psychosis (HCC)      Past Surgical History  Past Surgical History:   Procedure Laterality Date     SECTION      CHOLECYSTECTOMY               24   OT Last Visit   OT Visit Date 24   Note Type   Note type Evaluation   Pain Assessment   Pain Assessment Tool 0-10   Pain Score No Pain   Restrictions/Precautions   Other Precautions 1:1;Chair Alarm;Bed Alarm;Fall Risk;Cognitive;Impulsive   Home Living   Type of Home Apartment   Home Layout One level;Stairs to enter with rails   Bathroom Shower/Tub Tub/shower unit   Bathroom Toilet Standard   Prior Function   Level of [place] Independent with ADLs;Independent with IADLS   Lives With Alone   Comments Pt is a poor historian. Per chart review, Pt with difficulty caring for self at home. Unclear of exact level of self management.   ADL   Grooming Assistance 5  Supervision/Setup   UB Bathing Assistance 4  Minimal Assistance   LB Bathing Assistance 4  Minimal Assistance   UB Dressing Assistance 4  Minimal Assistance   LB Dressing Assistance 4  Minimal Assistance   Toileting Assistance  4  Minimal Assistance   Transfers   Sit to Stand 4  Minimal assistance   Stand to Sit 4  Minimal assistance   Functional Mobility   Functional Mobility   (CGA)   Additional items Hand hold assistance   Balance   Static Sitting Fair   Dynamic Sitting Fair -   Static Standing Poor +    Dynamic Standing Poor +   Ambulatory Poor +   Activity Tolerance   Activity Tolerance Treatment limited secondary to agitation   RUE Assessment   RUE Assessment WFL   Edema   RUE Edema +1   Cognition   Overall Cognitive Status Impaired   Arousal/Participation Alert   Attention Attends with cues to redirect   Orientation Level Oriented to person;Disoriented to situation;Disoriented to time   Memory Decreased recall of recent events;Decreased short term memory   Following Commands Follows one step commands inconsistently   Assessment   Limitation Decreased ADL status;Decreased UE strength;Decreased Safe judgement during ADL;Decreased cognition;Decreased endurance;Decreased high-level ADLs   Prognosis Fair   Assessment   Pt is a 59 y.o. female seen for OT evaluation s/p admit to [facility] on 7/23/2024 w/ Schizoaffective disorder, bipolar type (HCC).  See medical history above for extensive list of comorbidities affecting pt's functional performance at time of assessment. Personal factors affecting Pt at time of IE include:limited home support, behavioral pattern, difficulty performing IADLS , limited insight into deficits, and health management . Upon evaluation: Pt requires Betina for functional ambulation including bathroom mobility and negotiation of small spaces, Betina for ADL transfers.  Pt with poor engagement, paranoid, requires extensive verbal cues and encouragement to participate in ADLs. Pt frequently yelling random thoughts/words. Pt with very poor safety and requires supervision at all times due to poor insight and problem-solving. weakness, decreased strength, decreased balance, decreased tolerance, impaired memory, impaired sequencing, impaired problem solving, and decreased safety awareness. Pt to benefit from continued skilled OT services while in the hospital to address deficits as defined above and maximize level of functional independence w ADL's and functional mobility. Occupational performance areas to  address include: grooming, bathing/shower, toilet hygiene, dressing, health maintenance, functional mobility, and clothing management. From OT standpoint, recommendation at time of d/c would be moderate resource intensity.       Goals   STG Time Frame   (2 weeks)   Short Term Goals Pt will complete LB ADL with supervision.   Pt will complete toilet hygiene with supervision.   Pt will complete functional ambulation with supervision.   Pt will sequence a 2-step ADL with supervision.   Plan   Treatment Interventions ADL retraining;Functional transfer training;UE strengthening/ROM;Endurance training;Continued evaluation;Energy conservation;Activityengagement   Goal Expiration Date 08/19/24   OT Frequency 1-2x/wk   Discharge Recommendation   Rehab Resource Intensity Level, OT II (Moderate Resource Intensity)   AM-PAC Daily Activity Inpatient   Lower Body Dressing 2   Bathing 2   Toileting 2   Upper Body Dressing 3   Grooming 3   Eating 3   Daily Activity Raw Score 15   Daily Activity Standardized Score (Calc for Raw Score >=11) 34.69

## 2024-08-06 NOTE — TREATMENT TEAM
08/06/24 0745   Team Meeting   Meeting Type Daily Rounds   Initial Conference Date 08/06/24   Team Members Present   Team Members Present Physician;Nurse;   Physician Team Member Marie Agrawal   Nursing Team Member Clarissa   Care Management Team Member Mercedes   Patient/Family Present   Patient Present No   Patient's Family Present No     Patient remains on 1:1 for safety. IM Zyprexa given and was effective. Patient is currently on 2 antipsychotics. [facility] met with the patient yesterday to do the level of care assessment as post acute rehab was recommended by PT once the patient clears psychiatrically. No discharge date pending at this time.

## 2024-08-06 NOTE — PLAN OF CARE
Problem: OCCUPATIONAL THERAPY ADULT  Goal: Performs self-care activities at highest level of function for planned discharge setting.  See evaluation for individualized goals.  Description: Treatment Interventions: ADL retraining, Functional transfer training, UE strengthening/ROM, Endurance training, Continued evaluation, Energy conservation, Activityengagement          See flowsheet documentation for full assessment, interventions and recommendations.   Outcome: Progressing  Note: Limitation: Decreased ADL status, Decreased UE strength, Decreased Safe judgement during ADL, Decreased cognition, Decreased endurance, Decreased high-level ADLs  Prognosis: Fair  Assessment: Pt is a 59 y.o. female seen for OT evaluation s/p admit to [facility] on 7/23/2024 w/ Schizoaffective disorder, bipolar type (HCC).  See medical history above for extensive list of comorbidities affecting pt's functional performance at time of assessment. Personal factors affecting Pt at time of IE include:limited home support, behavioral pattern, difficulty performing IADLS , limited insight into deficits, and health management . Upon evaluation: Pt requires Betian for functional ambulation including bathroom mobility and negotiation of small spaces, Betina for ADL transfers.  Pt with poor engagement, paranoid, requires extensive verbal cues and encouragement to participate in ADLs. Pt frequently yelling random thoughts/words. Pt with very poor safety and requires supervision at all times due to poor insight and problem-solving. weakness, decreased strength, decreased balance, decreased tolerance, impaired memory, impaired sequencing, impaired problem solving, and decreased safety awareness. Pt to benefit from continued skilled OT services while in the hospital to address deficits as defined above and maximize level of functional independence w ADL's and functional mobility. Occupational performance areas to address include: grooming, bathing/shower,  toilet hygiene, dressing, health maintenance, functional mobility, and clothing management. From OT standpoint, recommendation at time of d/c would be moderate resource intensity.       Rehab Resource Intensity Level, OT: II (Moderate Resource Intensity)

## 2024-08-07 LAB
GLUCOSE SERPL-MCNC: 110 MG/DL (ref 65–140)
GLUCOSE SERPL-MCNC: 119 MG/DL (ref 65–140)
GLUCOSE SERPL-MCNC: 132 MG/DL (ref 65–140)
GLUCOSE SERPL-MCNC: 95 MG/DL (ref 65–140)

## 2024-08-07 PROCEDURE — 82948 REAGENT STRIP/BLOOD GLUCOSE: CPT

## 2024-08-07 PROCEDURE — 99232 SBSQ HOSP IP/OBS MODERATE 35: CPT | Performed by: STUDENT IN AN ORGANIZED HEALTH CARE EDUCATION/TRAINING PROGRAM

## 2024-08-07 RX ADMIN — CARVEDILOL 6.25 MG: 6.25 TABLET, FILM COATED ORAL at 08:19

## 2024-08-07 RX ADMIN — LORAZEPAM 0.5 MG: 0.5 TABLET ORAL at 13:32

## 2024-08-07 RX ADMIN — POLYETHYLENE GLYCOL 3350 17 G: 17 POWDER, FOR SOLUTION ORAL at 08:19

## 2024-08-07 RX ADMIN — LORAZEPAM 1 MG: 1 TABLET ORAL at 08:19

## 2024-08-07 RX ADMIN — FLUTICASONE FUROATE 1 PUFF: 100 POWDER RESPIRATORY (INHALATION) at 08:21

## 2024-08-07 RX ADMIN — POLYETHYLENE GLYCOL 3350 17 G: 17 POWDER, FOR SOLUTION ORAL at 11:52

## 2024-08-07 RX ADMIN — MELATONIN TAB 3 MG 3 MG: 3 TAB at 21:44

## 2024-08-07 RX ADMIN — CYANOCOBALAMIN TAB 1000 MCG 1000 MCG: 1000 TAB at 08:20

## 2024-08-07 RX ADMIN — OLANZAPINE 10 MG: 10 TABLET, ORALLY DISINTEGRATING ORAL at 20:13

## 2024-08-07 RX ADMIN — FAMOTIDINE 20 MG: 20 TABLET ORAL at 08:19

## 2024-08-07 RX ADMIN — SENNOSIDES AND DOCUSATE SODIUM 1 TABLET: 50; 8.6 TABLET ORAL at 21:44

## 2024-08-07 RX ADMIN — ATORVASTATIN CALCIUM 10 MG: 10 TABLET, FILM COATED ORAL at 08:20

## 2024-08-07 RX ADMIN — CLOZAPINE 200 MG: 200 TABLET ORAL at 08:19

## 2024-08-07 RX ADMIN — LORAZEPAM 1 MG: 1 TABLET ORAL at 21:44

## 2024-08-07 NOTE — PLAN OF CARE
Pt attends group and visible.   Problem: Alteration in Thoughts and Perception  Goal: Attend and participate in unit activities, including therapeutic, recreational, and educational groups  Description: Interventions:  -Encourage Visitation and family involvement in care  Outcome: Progressing

## 2024-08-07 NOTE — NURSING NOTE
Patient awake and restless but declines need for prn medications. Emotional support provided. Remains 1:1 for safety. Patient remains paranoid and hallucinations. Continue to monitor and support during treatment.

## 2024-08-07 NOTE — PLAN OF CARE
Problem: Alteration in Thoughts and Perception  Goal: Verbalize thoughts and feelings  Description: Interventions:  - Promote a nonjudgmental and trusting relationship with the patient through active listening and therapeutic communication  - Assess patient's level of functioning, behavior and potential for risk  - Engage patient in 1 on 1 interactions  - Encourage patient to express fears, feelings, frustrations, and discuss symptoms    - Snover patient to reality, help patient recognize reality-based thinking   - Administer medications as ordered and assess for potential side effects  - Provide the patient education related to the signs and symptoms of the illness and desired effects of prescribed medications  Outcome: Progressing  Goal: Refrain from acting on delusional thinking/internal stimuli  Description: Interventions:  - Monitor patient closely, per order   - Utilize least restrictive measures   - Set reasonable limits, give positive feedback for acceptable   - Administer medications as ordered and monitor of potential side effects  Outcome: Not Progressing  Goal: Agree to be compliant with medication regime, as prescribed and report medication side effects  Description: Interventions:  - Offer appropriate PRN medication and supervise ingestion; conduct AIMS, as needed   Outcome: Not Progressing  Goal: Attend and participate in unit activities, including therapeutic, recreational, and educational groups  Description: Interventions:  -Encourage Visitation and family involvement in care  Outcome: Not Progressing  Goal: Complete daily ADLs, including personal hygiene independently, as able  Description: Interventions:  - Observe, teach, and assist patient with ADLS  - Monitor and promote a balance of rest/activity, with adequate nutrition and elimination   Outcome: Not Progressing     Problem: Ineffective Coping  Goal: Free from restraint events  Description: - Utilize least restrictive measures   - Provide  behavioral interventions   - Redirect inappropriate behaviors   Outcome: Progressing     Problem: Risk for Self Injury/Neglect  Goal: Refrain from harming self  Description: Interventions:  - Monitor patient closely, per order  - Develop a trusting relationship  - Supervise medication ingestion, monitor effects and side effects   Outcome: Progressing     Problem: Potential for Falls  Goal: Patient will remain free of falls  Description: INTERVENTIONS:  - Educate patient on patient safety including physical limitations  - Instruct patient to call for assistance with activity   - Consult OT/PT to assist with strengthening/mobility   - Keep Call bell within reach  - Keep bed low and locked with side rails adjusted as appropriate  - Keep care items and personal belongings within reach  - Initiate and maintain comfort rounds  - Offer Toileting every 2 Hours, in advance of need  - Initiate/Maintain bed and chair alarm  - Obtain necessary fall risk management equipment: walker, wheelchair  - Apply yellow socks and bracelet for high fall risk patients  - Patient moved to room near nurses station  Outcome: Progressing     Problem: Nutrition/Hydration-ADULT  Goal: Nutrient/Hydration intake appropriate for improving, restoring or maintaining nutritional needs  Description: Monitor and assess patient's nutrition/hydration status for malnutrition. Collaborate with interdisciplinary team and initiate plan and interventions as ordered.  Monitor patient's weight and dietary intake as ordered or per policy. Utilize nutrition screening tool and intervene as necessary. Determine patient's food preferences and provide high-protein, high-caloric foods as appropriate.     INTERVENTIONS:  - Monitor oral intake, urinary output, labs, and treatment plans  - Assess nutrition and hydration status and recommend course of action  - Evaluate amount of meals eaten  - Assist patient with eating if necessary   - Allow adequate time for meals  -  Recommend/ encourage appropriate diets, oral nutritional supplements, and vitamin/mineral supplements  - Order, calculate, and assess calorie counts as needed  - Recommend, monitor, and adjust tube feedings and TPN/PPN based on assessed needs  - Assess need for intravenous fluids  - Provide specific nutrition/hydration education as appropriate  - Include patient/family/caregiver in decisions related to nutrition  Outcome: Progressing

## 2024-08-07 NOTE — TREATMENT TEAM
08/07/24 0742   Team Meeting   Meeting Type Daily Rounds   Initial Conference Date 08/07/24   Team Members Present   Team Members Present Physician;Nurse;   Physician Team Member Marie Agrawal   Nursing Team Member Clarissa   Care Management Team Member Mercedes   Patient/Family Present   Patient Present No   Patient's Family Present No     Patient is restless, paranoid, responding to stimuli. Refused evening PO meds, required IM. Clozaril to be slowly increased. Post acute rehab was recommended by PT once the patient clears psychiatrically. No discharge date pending at this time.

## 2024-08-07 NOTE — TREATMENT TEAM
Pt was visible in group with 1:1.      08/07/24 1000   Activity/Group Checklist   Group Other (Comment)  (Self care and focus)   Attendance Attended;Other (Comment)  (with 1:1)   Attendance Duration (min) 31-45   Interactions Did not interact   Affect/Mood Calm   Goals Achieved Identified feelings;Identified triggers;Identified relapse prevention strategies;Able to listen to others

## 2024-08-07 NOTE — NURSING NOTE
Received patient with continuous 1:1 for safety. Patient remains confused at times with paranoid behaviors. Patient with + Ah and Vh. Patient is responsive to redirection and needs reassurance often of her safety. Will continue to monitor and support during treatment.

## 2024-08-07 NOTE — PROGRESS NOTES
Progress Note - Behavioral Health   Meli Nowak 59 y.o. female MRN: [id number]  Unit/Bed#: [facility] 651-01 Encounter: [id number]    Patient was seen today for continuation of care, records reviewed and patient was discussed with the morning case review team.    Meli was seen today for psychiatric follow-up.  On assessment today, Meli was restless and paranoid.  Nightly meds refused, MOO utilized for continued medication compliance.  Thought process still disorganized and illogical patient is also noted to be mumbling to herself, will increase Clozaril to 250 mg daily with level to be obtained on 8/14/2024.  One-to-one still at the bedside for safety.  Tentative discharge ongoing as patient will need postacute rehab post discharge due to ambulatory dysfunction.    Meli denies acute suicidal/self-harm ideation/intent/plan upon direct inquiry at this time. Impulse control is intact.  Meli remains adherent to her current psychotropic medication regimen and denies any side effects from medications, as well as none noted on exam.    Vitals:  Vitals:    08/07/24 0755   BP:    Pulse:    Resp:    Temp:    SpO2: 91%       Laboratory Results:  I have personally reviewed all pertinent laboratory/tests results.    Psychiatric Review of Systems:  Behavior over the last 24 hours:  unchanged.   Sleep: needs improvement  Appetite: needs improvement  Medication side effects: none  ROS: no complaints, denies shortness of breath or chest pain and all other systems are negative for acute changes    Mental Status Evaluation:  Appearance:  disheveled, marginal hygiene   Behavior:  agitated, bizarre   Speech:  slow, scant   Mood:  anxious, irritable   Affect:  constricted   Thought Process:  disorganized, illogical, slowing of thoughts   Thought Content:  paranoid ideation   Perceptual Disturbances: denies auditory hallucinations when asked, does not appear responding to internal stimuli   Risk Potential: Suicidal ideation - None  Homicidal  ideation - None  Potential for aggression - No   Memory:  recent and remote memory: unable to assess due to lack of cooperation   Sensorium  person      Consciousness:  alert and awake   Attention: decreased concentration and decreased attention span   Insight:  limited   Judgment: limited   Gait/Station: needs assistive device   Motor Activity: no abnormal movements   Progress Toward Goals:   Meli is progressing towards goals of inpatient psychiatric treatment by continued medication compliance and is attending therapeutic modalities on the milieu. However, the patient continues to require inpatient psychiatric hospitalization for continued medication management and titration to optimize symptom reduction, improve sleep hygiene, and demonstrate adequate self-care.    Assessment & Plan   Principal Problem:    Schizoaffective disorder, bipolar type (HCC)  Active Problems:    Medical clearance for psychiatric admission    Type 2 diabetes mellitus (HCC)    Obesity    Tobacco abuse    Hyperlipidemia    Esophageal reflux    Essential (primary) hypertension    Vitamin B12 deficiency    Hypoxia    Ambulatory dysfunction      Recommended Treatment: Treatment plan and medication changes discussed and per the attending physician the plan is:    1.Continue with group therapy, milieu therapy and occupational therapy  2.Behavioral Health checks every 7 minutes  3.Continue frequent safety checks and vitals per unit protocol  4.Continue with SLIM medical management as indicated  5.Continue with current medication regimen  6.Will review labs in the a.m.  7.Disposition Planning: Discharge planning and efforts remain ongoing    Behavioral Health Medications: all current active meds have been reviewed and continue current psychiatric medications.  Current Facility-Administered Medications   Medication Dose Route Frequency Provider Last Rate    acetaminophen  650 mg Oral Q4H PRN JOSE ELIAS Figueroa      acetaminophen  650 mg Oral Q4H  PRN Marie Ziegler, JOSE ELIAS      acetaminophen  975 mg Oral Q6H PRN Marie Ziegler, JOSE ELIAS      atorvastatin  10 mg Oral Daily Marie Ziegler, JOSE ELIAS      bisacodyl  10 mg Rectal Daily PRN Marie Ziegler, JOSE ELIAS      carvedilol  6.25 mg Oral BID With Meals JOSE ELIAS Figueroa      cloNIDine  0.1 mg Transdermal Weekly JOSE ELIAS Figueroa      [START ON 8/8/2024] cloZAPine  250 mg Oral Daily JOSE ELIAS Figueroa      cyanocobalamin  1,000 mcg Oral Daily Marie Ziegler, JOSE ELIAS      famotidine  20 mg Oral BID Shan Fitzgerald, DO      FLUoxetine  40 mg Oral Daily Dereje Banuelos MD      fluticasone  1 puff Inhalation Daily JOSE ELIAS Figueroa      hydrOXYzine HCL  25 mg Oral Q6H PRN Max 4/day Marie Ziegler, JOSE ELIAS      hydrOXYzine HCL  50 mg Oral Q6H PRN Max 4/day JOSE ELIAS Figueroa      insulin lispro  1-5 Units Subcutaneous 4x Daily (AC & HS) JOSE ELIAS Figueroa      ipratropium-albuterol  3 mL Nebulization Q4H PRN Darin Lawton MD      LORazepam  0.5 mg Sublingual After Lunch Dereje Banuelos MD      Or    LORazepam  0.5 mg Intramuscular After Lunch Dereje Banuelos MD      LORazepam  1 mg Intramuscular Q6H PRN Max 3/day JOSE ELIAS Fgiueroa      LORazepam  1 mg Oral BID Dereje Banuelos MD      Or    LORazepam  1 mg Intramuscular BID Dereje Banuelos MD      LORazepam  1 mg Oral Q6H PRN Max 3/day JOSE ELIAS Figueroa      melatonin  3 mg Oral HS JSOE ELIAS Figueroa      Multivitamin  15 mL Oral Daily JOSE ELIAS Figueroa      nicotine  1 patch Transdermal Daily JOSE ELIAS Figueroa      OLANZapine  10 mg Oral HS JOSE ELIAS Figueroa      Or    OLANZapine  5 mg Intramuscular HS JOSE ELIAS Figueroa      OLANZapine  2.5 mg Oral Q6H PRN Dereje Banuelos MD      Or    OLANZapine  2.5 mg Intramuscular Q6H PRN Dereje Banuelos MD      OLANZapine  5 mg Oral Q6H PRN Dereje Banuelos MD      Or    OLANZapine  5 mg Intramuscular Q6H PRN Dereje Banuelos MD      ondansetron  4 mg Oral Q6H PRN Darin Lawton MD       polyethylene glycol  17 g Oral Daily PRN JOSE ELIAS Figueroa      polyethylene glycol  17 g Oral Daily Kristenabdoulaye TomasJOSE ELIAS Weller      senna-docusate sodium  1 tablet Oral HS JOSE ELIAS Figueroa      traZODone  50 mg Oral HS PRN JOSE ELIAS Figueroa         Risks / Benefits of Treatment:  Risks, benefits, and possible side effects of medications explained to patient and patient verbalizes understanding and agreement for treatment.    Counseling / Coordination of Care:  Patient's progress reviewed with nursing staff.  Medications, treatment progress and treatment plan reviewed with patient.  Supportive counseling provided to the patient.    Total floor/unit time spent today 25 minutes. Greater than 50% of total time was spent with the patient and / or family counseling and / or coordination of care. A description of the counseling / coordination of care: medication education, treatment plan, supportive therapy.    This note was completed in part utilizing Dragon dictation Software. Grammatical, translation, syntax errors, random word insertions, spelling mistakes, and incomplete sentences may be an occasional consequence of this system secondary to software limitations with voice recognition, ambient noise, and hardware issues. If you have any questions or concerns about the content, text, or information contained within the body of this dictation, please contact the provider for clarification

## 2024-08-08 LAB
GLUCOSE SERPL-MCNC: 110 MG/DL (ref 65–140)
GLUCOSE SERPL-MCNC: 119 MG/DL (ref 65–140)
GLUCOSE SERPL-MCNC: 161 MG/DL (ref 65–140)
GLUCOSE SERPL-MCNC: 91 MG/DL (ref 65–140)

## 2024-08-08 PROCEDURE — 82948 REAGENT STRIP/BLOOD GLUCOSE: CPT

## 2024-08-08 PROCEDURE — 99232 SBSQ HOSP IP/OBS MODERATE 35: CPT

## 2024-08-08 PROCEDURE — 92526 ORAL FUNCTION THERAPY: CPT

## 2024-08-08 RX ADMIN — POLYETHYLENE GLYCOL 3350 17 G: 17 POWDER, FOR SOLUTION ORAL at 08:14

## 2024-08-08 RX ADMIN — FLUOXETINE 40 MG: 20 SOLUTION ORAL at 08:16

## 2024-08-08 RX ADMIN — MELATONIN TAB 3 MG 3 MG: 3 TAB at 21:36

## 2024-08-08 RX ADMIN — OLANZAPINE 10 MG: 10 TABLET, ORALLY DISINTEGRATING ORAL at 21:40

## 2024-08-08 RX ADMIN — FAMOTIDINE 20 MG: 20 TABLET ORAL at 18:45

## 2024-08-08 RX ADMIN — FAMOTIDINE 20 MG: 20 TABLET ORAL at 08:15

## 2024-08-08 RX ADMIN — CLOZAPINE 250 MG: 25 TABLET ORAL at 08:14

## 2024-08-08 RX ADMIN — FLUTICASONE FUROATE 1 PUFF: 100 POWDER RESPIRATORY (INHALATION) at 08:16

## 2024-08-08 RX ADMIN — CARVEDILOL 6.25 MG: 6.25 TABLET, FILM COATED ORAL at 08:14

## 2024-08-08 RX ADMIN — CYANOCOBALAMIN TAB 1000 MCG 1000 MCG: 1000 TAB at 08:15

## 2024-08-08 RX ADMIN — LORAZEPAM 1 MG: 1 TABLET ORAL at 08:15

## 2024-08-08 RX ADMIN — INSULIN LISPRO 1 UNITS: 100 INJECTION, SOLUTION INTRAVENOUS; SUBCUTANEOUS at 12:00

## 2024-08-08 RX ADMIN — CARVEDILOL 6.25 MG: 6.25 TABLET, FILM COATED ORAL at 18:45

## 2024-08-08 RX ADMIN — OLANZAPINE 5 MG: 5 TABLET, FILM COATED ORAL at 00:37

## 2024-08-08 RX ADMIN — LORAZEPAM 0.5 MG: 0.5 TABLET ORAL at 13:07

## 2024-08-08 RX ADMIN — LORAZEPAM 1 MG: 1 TABLET ORAL at 21:36

## 2024-08-08 RX ADMIN — ATORVASTATIN CALCIUM 10 MG: 10 TABLET, FILM COATED ORAL at 08:14

## 2024-08-08 RX ADMIN — SENNOSIDES AND DOCUSATE SODIUM 1 TABLET: 50; 8.6 TABLET ORAL at 21:36

## 2024-08-08 NOTE — NURSING NOTE
Pt refused breakfast, good appetite at lunch.  VSS.   at 1100.  Pt accepted po meds except MVI and nicotine patch.  Monitored for safety and support.

## 2024-08-08 NOTE — SPEECH THERAPY NOTE
Speech Language/Pathology    Speech/Language Pathology Progress Note    Patient Name: Meli Nowak  Today's Date: 2024                     SLP RECOMMENDATIONS:         Diet: Level 3 Dental soft        Liquids: Thin         Medications: as best tolerated         Aspiration Precautions: upright, slow rate, small bites/sips        Summary:  Pt with limited appetite/participation this session. Per chart pt ate 100% of dinner last night and 25% of lunch. Pt only agreeable to a few small sips of thin liquid from breakfast tray. No immediate overt s/s aspiration observed with thin liquids. Pt declined all solid trials, including trials of advanced textures despite being offered multiple food choices. SLP reviewed general aspiration precautions and importance of oral care with pt. Recommend continuing current diet at this time.     Assessment:  No overt s/s aspiration with thin liquids     Plan/Recommendations:  Level 3 dental soft/thin liquids   SLP to follow peripherally as needed   Will plan to trial regular consistency tray when pt is agreeable   General aspiration precautions         Lab Results   Component Value Date    WBC 6.75 2024    HGB 14.7 2024    HCT 47.0 (H) 2024    MCV 95 2024     2024           Problem List  Principal Problem:    Schizoaffective disorder, bipolar type (HCC)  Active Problems:    Medical clearance for psychiatric admission    Type 2 diabetes mellitus (HCC)    Obesity    Tobacco abuse    Hyperlipidemia    Esophageal reflux    Essential (primary) hypertension    Vitamin B12 deficiency    Hypoxia    Ambulatory dysfunction       Past Medical History  Past Medical History:   Diagnosis Date    Cognitive impairment     Diabetes mellitus (HCC)     Essential (primary) hypertension     Psychosis (HCC)         Past Surgical History  Past Surgical History:   Procedure Laterality Date     SECTION      CHOLECYSTECTOMY

## 2024-08-08 NOTE — PROGRESS NOTES
Progress Note - Behavioral Health   Meli Nowak 59 y.o. female MRN: [id number]  Unit/Bed#: [facility] 651-01 Encounter: [id number]    Patient was seen today for continuation of care, records reviewed and patient was discussed with the morning case review team.    Meli was seen today for psychiatric follow-up.  On assessment today, Meli was seen lying in bed.  Less irritable today, patient is noted to be more compliant although still disorganized and illogical.  P.o. Zyprexa given overnight due to restlessness, Clozaril currently prescribed at 250 mg with weekly labs to be obtained on Monday.  One-to-one remaining at the bedside for safety.  Tentative discharge ongoing.    Meli denies acute suicidal/self-harm ideation/intent/plan upon direct inquiry at this time.  Impulse control is improving.  Meli remains adherent to her current psychotropic medication regimen and denies any side effects from medications, as well as none noted on exam.    Vitals:  Vitals:    08/08/24 1252   BP:    Pulse:    Resp:    Temp:    SpO2: 97%       Laboratory Results:  I have personally reviewed all pertinent laboratory/tests results.    Psychiatric Review of Systems:  Behavior over the last 24 hours:  unchanged.   Sleep: good  Appetite: good  Medication side effects: none  ROS: no complaints, denies shortness of breath or chest pain and all other systems are negative for acute changes    Mental Status Evaluation:  Appearance:  disheveled, marginal hygiene, overweight   Behavior:  bizarre, still at times agitated   Speech:  slow   Mood:  anxious, less irritable   Affect:  constricted   Thought Process:  disorganized, illogical   Thought Content:  paranoid ideation   Perceptual Disturbances: appears preoccupied, talks to self at times, denies when asked, but talks to self at times   Risk Potential: Suicidal ideation - None at present  Homicidal ideation - None  Potential for aggression - No   Memory:  recent and remote memory: unable to assess  due to lack of cooperation   Sensorium  person      Consciousness:  alert and awake   Attention: attention span and concentration are age appropriate   Insight:  limited   Judgment: limited   Gait/Station: needs assistive device   Motor Activity: no abnormal movements   Progress Toward Goals:   Meli is progressing towards goals of inpatient psychiatric treatment by continued medication compliance and is attending therapeutic modalities on the milieu. However, the patient continues to require inpatient psychiatric hospitalization for continued medication management and titration to optimize symptom reduction, improve sleep hygiene, and demonstrate adequate self-care.    Assessment & Plan   Principal Problem:    Schizoaffective disorder, bipolar type (HCC)  Active Problems:    Medical clearance for psychiatric admission    Type 2 diabetes mellitus (HCC)    Obesity    Tobacco abuse    Hyperlipidemia    Esophageal reflux    Essential (primary) hypertension    Vitamin B12 deficiency    Hypoxia    Ambulatory dysfunction      Recommended Treatment: Treatment plan and medication changes discussed and per the attending physician the plan is:    1.Continue with group therapy, milieu therapy and occupational therapy  2.Behavioral Health checks every 7 minutes  3.Continue frequent safety checks and vitals per unit protocol  4.Continue with SLIM medical management as indicated  5.Continue with current medication regimen  6.Will review labs in the a.m.  7.Disposition Planning: Discharge planning and efforts remain ongoing    Behavioral Health Medications: all current active meds have been reviewed and continue current psychiatric medications.  Current Facility-Administered Medications   Medication Dose Route Frequency Provider Last Rate    acetaminophen  650 mg Oral Q4H PRN JOSE ELIAS Figueroa      acetaminophen  650 mg Oral Q4H PRN JOSE ELIAS Figueroa      acetaminophen  975 mg Oral Q6H PRN JOSE ELIAS Figueroa       atorvastatin  10 mg Oral Daily Marie Ziegler, JOSE ELIAS      bisacodyl  10 mg Rectal Daily PRN Marie Ziegler, JOSE ELIAS      carvedilol  6.25 mg Oral BID With Meals JOSE ELIAS Figueroa      cloNIDine  0.1 mg Transdermal Weekly Marie Ziegler, JOSE ELIAS      cloZAPine  250 mg Oral Daily Marie Ziegler, JOSE ELIAS      cyanocobalamin  1,000 mcg Oral Daily Marie Ziegler, JOSE ELIAS      famotidine  20 mg Oral BID Shan Fitzgerald DO      FLUoxetine  40 mg Oral Daily Dereje Banuelos MD      fluticasone  1 puff Inhalation Daily Marie Ziegler, JOSE ELIAS      hydrOXYzine HCL  25 mg Oral Q6H PRN Max 4/day Marie Ziegler, JOSE ELIAS      hydrOXYzine HCL  50 mg Oral Q6H PRN Max 4/day JOSE ELIAS Figueroa      insulin lispro  1-5 Units Subcutaneous 4x Daily (AC & HS) JOSE ELIAS Figueroa      ipratropium-albuterol  3 mL Nebulization Q4H PRN Darin Lawton MD      LORazepam  0.5 mg Sublingual After Lunch Dereje Banuelos MD      Or    LORazepam  0.5 mg Intramuscular After Lunch Dereje Banuelos MD      LORazepam  1 mg Intramuscular Q6H PRN Max 3/day JOSE ELIAS Figueroa      LORazepam  1 mg Oral BID Dereje Banuelos MD      Or    LORazepam  1 mg Intramuscular BID Dereje Banuelos MD      LORazepam  1 mg Oral Q6H PRN Max 3/day JOSE ELIAS Figueroa      melatonin  3 mg Oral HS JOSE ELIAS Figueroa      Multivitamin  15 mL Oral Daily JOSE ELIAS Figueroa      nicotine  1 patch Transdermal Daily JOSE ELIAS Figueroa      OLANZapine  10 mg Oral HS JOSE ELIAS Figueroa      Or    OLANZapine  5 mg Intramuscular HS JOSE ELIAS Figueroa      OLANZapine  2.5 mg Oral Q6H PRN Dereje Banuelos MD      Or    OLANZapine  2.5 mg Intramuscular Q6H PRN Dereje Banuelos MD      OLANZapine  5 mg Oral Q6H PRN Dereje Banuelos MD      Or    OLANZapine  5 mg Intramuscular Q6H PRN Dereje Banuelos MD      ondansetron  4 mg Oral Q6H PRN Darin Lawton MD      polyethylene glycol  17 g Oral Daily PRN Marie Ziegler, JOSE ELIAS      polyethylene glycol  17 g Oral Daily Kristen Ludwig  JOSE ELIAS Logan      senna-docusate sodium  1 tablet Oral HS JOSE ELIAS Figueroa      traZODone  50 mg Oral HS PRN JOSE ELIAS Figueroa         Risks / Benefits of Treatment:  Risks, benefits, and possible side effects of medications explained to patient and patient verbalizes understanding and agreement for treatment.    Counseling / Coordination of Care:  Patient's progress reviewed with nursing staff.  Medications, treatment progress and treatment plan reviewed with patient.  Supportive counseling provided to the patient.    Total floor/unit time spent today 25 minutes. Greater than 50% of total time was spent with the patient and / or family counseling and / or coordination of care. A description of the counseling / coordination of care: medication education, treatment plan, supportive therapy.    This note was completed in part utilizing Dragon dictation Software. Grammatical, translation, syntax errors, random word insertions, spelling mistakes, and incomplete sentences may be an occasional consequence of this system secondary to software limitations with voice recognition, ambient noise, and hardware issues. If you have any questions or concerns about the content, text, or information contained within the body of this dictation, please contact the provider for clarification

## 2024-08-08 NOTE — PLAN OF CARE
Problem: Prexisting or High Potential for Compromised Skin Integrity  Goal: Skin integrity is maintained or improved  Description: INTERVENTIONS:  - Identify patients at risk for skin breakdown  - Assess and monitor skin integrity  - Assess and monitor nutrition and hydration status  - Monitor labs   - Assess for incontinence   - Turn and reposition patient  - Assist with mobility/ambulation  - Relieve pressure over bony prominences  - Avoid friction and shearing  - Provide appropriate hygiene as needed including keeping skin clean and dry  - Evaluate need for skin moisturizer/barrier cream  - Collaborate with interdisciplinary team   - Patient/family teaching  - Consider wound care consult   Outcome: Progressing     Problem: Alteration in Thoughts and Perception  Goal: Verbalize thoughts and feelings  Description: Interventions:  - Promote a nonjudgmental and trusting relationship with the patient through active listening and therapeutic communication  - Assess patient's level of functioning, behavior and potential for risk  - Engage patient in 1 on 1 interactions  - Encourage patient to express fears, feelings, frustrations, and discuss symptoms    - [place] patient to reality, help patient recognize reality-based thinking   - Administer medications as ordered and assess for potential side effects  - Provide the patient education related to the signs and symptoms of the illness and desired effects of prescribed medications  Outcome: Not Progressing  Goal: Refrain from acting on delusional thinking/internal stimuli  Description: Interventions:  - Monitor patient closely, per order   - Utilize least restrictive measures   - Set reasonable limits, give positive feedback for acceptable   - Administer medications as ordered and monitor of potential side effects  Outcome: Not Progressing  Goal: Attend and participate in unit activities, including therapeutic, recreational, and educational groups  Description:  Interventions:  -Encourage Visitation and family involvement in care  Outcome: Progressing     Problem: Ineffective Coping  Goal: Demonstrates healthy coping skills  Outcome: Not Progressing  Goal: Participates in unit activities  Description: Interventions:  - Provide therapeutic environment   - Provide required programming   - Redirect inappropriate behaviors   Outcome: Progressing  Goal: Free from restraint events  Description: - Utilize least restrictive measures   - Provide behavioral interventions   - Redirect inappropriate behaviors   Outcome: Progressing     Problem: Risk for Self Injury/Neglect  Goal: Refrain from harming self  Description: Interventions:  - Monitor patient closely, per order  - Develop a trusting relationship  - Supervise medication ingestion, monitor effects and side effects   Outcome: Progressing     Problem: Potential for Falls  Goal: Patient will remain free of falls  Description: INTERVENTIONS:  - Educate patient on patient safety including physical limitations  - Instruct patient to call for assistance with activity   - Consult OT/PT to assist with strengthening/mobility   - Keep Call bell within reach  - Keep bed low and locked with side rails adjusted as appropriate  - Keep care items and personal belongings within reach  - Initiate and maintain comfort rounds  - Offer Toileting every 2 Hours, in advance of need  - Initiate/Maintain bed and chair alarm  - Obtain necessary fall risk management equipment: walker, wheelchair  - Apply yellow socks and bracelet for high fall risk patients  - Patient moved to room near nurses station  Outcome: Progressing     Problem: Nutrition/Hydration-ADULT  Goal: Nutrient/Hydration intake appropriate for improving, restoring or maintaining nutritional needs  Description: Monitor and assess patient's nutrition/hydration status for malnutrition. Collaborate with interdisciplinary team and initiate plan and interventions as ordered.  Monitor patient's  weight and dietary intake as ordered or per policy. Utilize nutrition screening tool and intervene as necessary. Determine patient's food preferences and provide high-protein, high-caloric foods as appropriate.     INTERVENTIONS:  - Monitor oral intake, urinary output, labs, and treatment plans  - Assess nutrition and hydration status and recommend course of action  - Evaluate amount of meals eaten  - Assist patient with eating if necessary   - Allow adequate time for meals  - Recommend/ encourage appropriate diets, oral nutritional supplements, and vitamin/mineral supplements  - Order, calculate, and assess calorie counts as needed  - Recommend, monitor, and adjust tube feedings and TPN/PPN based on assessed needs  - Assess need for intravenous fluids  - Provide specific nutrition/hydration education as appropriate  - Include patient/family/caregiver in decisions related to nutrition  Outcome: Progressing

## 2024-08-08 NOTE — NURSING NOTE
"Meli is restless and wandering in room. Meli is agitated as she feels she \" needs to leave and go to heaven.\" Patient reports being \"scared\" and needs frequent reassurance of her safety. Patient remains on 1:1 for safety and medicated with Zyprexa as per prn.   "

## 2024-08-08 NOTE — CASE MANAGEMENT
LAURIE received a call from the patient [facility] Conchita Hall  asking if she could bring the patient in Chinese food. LAURIE reported that we would not be able to accommodate that at this time. Conchita reports the patient does seem to be slowing improving but is not near her baseline at all. Conchita reports at baseline, the patient is fully functional and lives independently. LAURIE and Conchita agreed to remain in touch regarding the patient's progress/disposition plan.

## 2024-08-08 NOTE — NURSING NOTE
Pt continues on 1 to 1 disorganized in conversation. Wants to go home but isn't sure they want her home, then denies she wants to go home. Somatic at times with denial when asked if she wants medication to help. Meal compliant increased difficulty with medication , but did eventually take it so no IM was needed. Denies SI/Hi. Q 7 minute checks maintained.

## 2024-08-08 NOTE — TREATMENT TEAM
08/08/24 0744   Team Meeting   Meeting Type Daily Rounds   Initial Conference Date 08/08/24   Team Members Present   Team Members Present Physician;Nurse;;   Physician Team Member Marie Musa, Tena MOCTEZUMA   Nursing Team Member Clarissa   Care Management Team Member Mercedes   Social Work Team Member Ayse   Patient/Family Present   Patient Present No   Patient's Family Present No   Pharmacy: [facility]    Remains on 1:1 for safety. Medication compliant. Eating well. Remains paranoid with delusions. AH/VH. Clozaril slowly being increased. Zyprexa PO last night due to restlessness. Post acute rehab was recommended by PT once the patient clears psychiatrically. No discharge date pending at this time.

## 2024-08-08 NOTE — NURSING NOTE
Patient appears to have obtained relief from medication. Resting at this time w/o distress noted. Will continue to monitor. Remains on 1:1 for safety.

## 2024-08-08 NOTE — NURSING NOTE
Patient refused evening dose of Coreg 6.25 MG PO. This writer encouraged patient to take medication discussing the risks of not taking but patient continued to refuse.

## 2024-08-09 LAB
GLUCOSE SERPL-MCNC: 106 MG/DL (ref 65–140)
GLUCOSE SERPL-MCNC: 107 MG/DL (ref 65–140)
GLUCOSE SERPL-MCNC: 133 MG/DL (ref 65–140)
GLUCOSE SERPL-MCNC: 90 MG/DL (ref 65–140)

## 2024-08-09 PROCEDURE — 82948 REAGENT STRIP/BLOOD GLUCOSE: CPT

## 2024-08-09 PROCEDURE — 99232 SBSQ HOSP IP/OBS MODERATE 35: CPT | Performed by: PSYCHIATRY & NEUROLOGY

## 2024-08-09 RX ADMIN — NICOTINE 1 PATCH: 7 PATCH, EXTENDED RELEASE TRANSDERMAL at 10:58

## 2024-08-09 RX ADMIN — LORAZEPAM 1 MG: 1 TABLET ORAL at 20:54

## 2024-08-09 RX ADMIN — ACETAMINOPHEN 650 MG: 325 TABLET ORAL at 21:38

## 2024-08-09 RX ADMIN — CARVEDILOL 6.25 MG: 6.25 TABLET, FILM COATED ORAL at 16:30

## 2024-08-09 RX ADMIN — OLANZAPINE 10 MG: 10 TABLET, ORALLY DISINTEGRATING ORAL at 20:55

## 2024-08-09 RX ADMIN — POLYETHYLENE GLYCOL 3350 17 G: 17 POWDER, FOR SOLUTION ORAL at 10:58

## 2024-08-09 RX ADMIN — SENNOSIDES AND DOCUSATE SODIUM 1 TABLET: 50; 8.6 TABLET ORAL at 20:54

## 2024-08-09 RX ADMIN — LORAZEPAM 1 MG: 1 TABLET ORAL at 10:55

## 2024-08-09 RX ADMIN — MELATONIN TAB 3 MG 3 MG: 3 TAB at 20:55

## 2024-08-09 RX ADMIN — CLOZAPINE 250 MG: 25 TABLET ORAL at 10:56

## 2024-08-09 RX ADMIN — LORAZEPAM 0.5 MG: 0.5 TABLET ORAL at 16:36

## 2024-08-09 RX ADMIN — CLONIDINE 0.1 MG: 0.1 PATCH TRANSDERMAL at 11:30

## 2024-08-09 RX ADMIN — FAMOTIDINE 20 MG: 20 TABLET ORAL at 16:36

## 2024-08-09 NOTE — NURSING NOTE
Patient pleasant, calm and cooperative, able to make needs known. Is not visible on the module, is not social with peers, attends no groups. Denies SI/HI/AVH, anxiety or depressionreports. Medication compliant. Will continue to monitor. 1:1 at bedside for safety maintained.

## 2024-08-09 NOTE — PLAN OF CARE
Problem: Alteration in Thoughts and Perception  Goal: Verbalize thoughts and feelings  Description: Interventions:  - Promote a nonjudgmental and trusting relationship with the patient through active listening and therapeutic communication  - Assess patient's level of functioning, behavior and potential for risk  - Engage patient in 1 on 1 interactions  - Encourage patient to express fears, feelings, frustrations, and discuss symptoms    - [place] patient to reality, help patient recognize reality-based thinking   - Administer medications as ordered and assess for potential side effects  - Provide the patient education related to the signs and symptoms of the illness and desired effects of prescribed medications  Outcome: Progressing  Goal: Agree to be compliant with medication regime, as prescribed and report medication side effects  Description: Interventions:  - Offer appropriate PRN medication and supervise ingestion; conduct AIMS, as needed   Outcome: Progressing     Problem: Ineffective Coping  Goal: Understands least restrictive measures  Description: Interventions:  - Utilize least restrictive behavior  Outcome: Progressing     Problem: Depression  Goal: Verbalize thoughts and feelings  Description: Interventions:  - Assess and re-assess patient's level of risk   - Engage patient in 1:1 interactions, daily, for a minimum of 15 minutes   - Encourage patient to express feelings, fears, frustrations, hopes   Outcome: Progressing     Problem: Anxiety  Goal: Anxiety is at manageable level  Description: Interventions:  - Assess and monitor patient's anxiety level.   - Monitor for signs and symptoms (heart palpitations, chest pain, shortness of breath, headaches, nausea, feeling jumpy, restlessness, irritable, apprehensive).   - Collaborate with interdisciplinary team and initiate plan and interventions as ordered.  - [place] patient to unit/surroundings  - Explain treatment plan  - Encourage participation in  care  - Encourage verbalization of concerns/fears  - Identify coping mechanisms  - Assist in developing anxiety-reducing skills  - Administer/offer alternative therapies  - Limit or eliminate stimulants  Outcome: Progressing     Problem: Alteration in Thoughts and Perception  Goal: Refrain from acting on delusional thinking/internal stimuli  Description: Interventions:  - Monitor patient closely, per order   - Utilize least restrictive measures   - Set reasonable limits, give positive feedback for acceptable   - Administer medications as ordered and monitor of potential side effects  Outcome: Not Progressing     Problem: Ineffective Coping  Goal: Participates in unit activities  Description: Interventions:  - Provide therapeutic environment   - Provide required programming   - Redirect inappropriate behaviors   Outcome: Not Progressing

## 2024-08-09 NOTE — CASE MANAGEMENT
"CM spoke to the patient who reported she is \"getting there\" and feeling \"up and down.\" The patient reports she hopes she can return home soon. The patient was more talkative and clear with CM than in previous interactions. Discharge pending post acute rehab vs home with continued services once psychiatrically stable.   "

## 2024-08-09 NOTE — PROGRESS NOTES
Progress Note - Behavioral Health     Meli Nowak 59 y.o. female MRN: [id number]   Unit/Bed#: [facility] 651-01 Encounter: [id number]    Behavior over the last 24 hours: unchanged.     Meli was seen today in follow-up for continuation of care. Per staff,no acute events reported overnight. Still isolated to room, not participating in groups. Still on 1;1 supervision.  Meli is seen resting in bed comfortably. She exhibits poor eye contact, very uncooperative with interview. She is mumbling and whispering. At times, would often refuse to participate. Meli adamantly denies suicidal/homicidal ideation in addition to thoughts of self-injury. Meli presently is contacting for safety on the unit and feels comfortable to confide in staff if thoughts arise. At time of interview, paranoid and disorganized behavior persists. Meli has been complaint with medications and tolerating without any side effects.       Sleep: slept better  Appetite: fair  Medication side effects: No   ROS: no complaints, all other systems are negative    Mental Status Evaluation:    Appearance:  disheveled, wearing hospital clothes   Behavior:  uncooperative   Speech:  decreased volume, does not want to talk, whispering   Mood:  euthymic   Affect:  labile, mood-incongruent   Thought Process:  disorganized   Associations: concrete associations   Thought Content:  paranoid ideation   Perceptual Disturbances: no auditory hallucinations, no visual hallucinations, appears preoccupied   Risk Potential: Suicidal ideation - None at present  Homicidal ideation - None at present  Potential for aggression - No   Sensorium:  oriented to person, place, and time/date   Memory:  recent and remote memory grossly intact   Consciousness:  alert and awake   Attention/Concentration: decreased concentration and decreased attention span   Insight:  poor   Judgment: poor   Gait/Station: in bed   Motor Activity: no abnormal movements     Vital signs in last 24 hours:    Temp:   [97.1 °F (36.2 °C)-98.2 °F (36.8 °C)] 98.2 °F (36.8 °C)  HR:  [77-80] 77  Resp:  [16-17] 17  BP: (122-131)/(59-69) 128/59    Laboratory results: I have personally reviewed all pertinent laboratory/tests results    Results from the past 24 hours:   Recent Results (from the past 24 hour(s))   Fingerstick Glucose (POCT)    Collection Time: 08/08/24  4:06 PM   Result Value Ref Range    POC Glucose 110 65 - 140 mg/dl   Fingerstick Glucose (POCT)    Collection Time: 08/08/24  9:08 PM   Result Value Ref Range    POC Glucose 91 65 - 140 mg/dl   Fingerstick Glucose (POCT)    Collection Time: 08/09/24  7:05 AM   Result Value Ref Range    POC Glucose 107 65 - 140 mg/dl   Fingerstick Glucose (POCT)    Collection Time: 08/09/24 11:33 AM   Result Value Ref Range    POC Glucose 106 65 - 140 mg/dl       Progress Toward Goals: poor insight    Assessment & Plan   Principal Problem:    Schizoaffective disorder, bipolar type (HCC)  Active Problems:    Medical clearance for psychiatric admission    Type 2 diabetes mellitus (HCC)    Obesity    Tobacco abuse    Hyperlipidemia    Esophageal reflux    Essential (primary) hypertension    Vitamin B12 deficiency    Hypoxia    Ambulatory dysfunction      Recommended Treatment:     Planned medication and treatment changes:    All current active medications have been reviewed  Encourage group therapy, milieu therapy and occupational therapy  Behavioral Health checks every 7 minutes    Continue current medications  DC planning ongoing    Current Facility-Administered Medications   Medication Dose Route Frequency Provider Last Rate    acetaminophen  650 mg Oral Q4H PRN JOSE ELIAS Figueroa      acetaminophen  650 mg Oral Q4H PRN JOSE ELIAS Figueroa      acetaminophen  975 mg Oral Q6H PRN JOSE ELIAS Figueroa      atorvastatin  10 mg Oral Daily JOSE ELIAS Figueroa      bisacodyl  10 mg Rectal Daily PRN JOSE ELIAS Figueroa      carvedilol  6.25 mg Oral BID With Meals JOSE ELIAS Figueroa       cloNIDine  0.1 mg Transdermal Weekly JOSE ELIAS Figueroa      cloZAPine  250 mg Oral Daily JOSE ELIAS Figueroa      cyanocobalamin  1,000 mcg Oral Daily JOSE ELIAS Figueroa      famotidine  20 mg Oral BID Shan Fitzgerald,       FLUoxetine  40 mg Oral Daily Dereje Banuelos MD      fluticasone  1 puff Inhalation Daily JOSE ELIAS Figueroa      hydrOXYzine HCL  25 mg Oral Q6H PRN Max 4/day JOSE ELIAS Figueroa      hydrOXYzine HCL  50 mg Oral Q6H PRN Max 4/day JOSE ELIAS Figueroa      insulin lispro  1-5 Units Subcutaneous 4x Daily (AC & HS) JOSE ELIAS Figueroa      ipratropium-albuterol  3 mL Nebulization Q4H PRN Darin Lawton MD      LORazepam  0.5 mg Sublingual After Lunch Dereje Banuelos MD      Or    LORazepam  0.5 mg Intramuscular After Lunch Dereje Banuelos MD      LORazepam  1 mg Intramuscular Q6H PRN Max 3/day JOSE ELIAS Figueroa      LORazepam  1 mg Oral BID Dereje Banuelos MD      Or    LORazepam  1 mg Intramuscular BID Dereje Banuelos MD      LORazepam  1 mg Oral Q6H PRN Max 3/day JOSE ELIAS Figueroa      melatonin  3 mg Oral HS JOSE ELIAS Figueroa      Multivitamin  15 mL Oral Daily JOSE ELIAS Figueroa      nicotine  1 patch Transdermal Daily JOSE ELIAS Figueroa      OLANZapine  10 mg Oral HS JOSE ELIAS Figueroa      Or    OLANZapine  5 mg Intramuscular HS JOSE ELIAS Figueroa      OLANZapine  2.5 mg Oral Q6H PRN Dereje Banuelos MD      Or    OLANZapine  2.5 mg Intramuscular Q6H PRN Dereje Banuelos MD      OLANZapine  5 mg Oral Q6H PRN Dereje Banuelos MD      Or    OLANZapine  5 mg Intramuscular Q6H PRN Dereje Banuelos MD      ondansetron  4 mg Oral Q6H PRN Darin Lawton MD      polyethylene glycol  17 g Oral Daily PRN JOSE ELIAS Figueroa      polyethylene glycol  17 g Oral Daily JOSE ELIAS Ortega      senna-docusate sodium  1 tablet Oral HS JOSE ELIAS Figueroa      traZODone  50 mg Oral HS PRN JOSE ELIAS Figueroa       Risks / Benefits of Treatment:    Risks,  benefits, and possible side effects of medications explained to patient and patient verbalizes understanding and agreement for treatment.    Counseling / Coordination of Care:    Total floor / unit time spent today 20 minutes. Greater than 50% of total time was spent with the patient and / or family counseling and / or coordination of care. A description of counseling / coordination of care:  Patient's progress discussed with staff in treatment team meeting.  Medications, treatment progress and treatment plan reviewed with patient.    Rehana Borrego PA-C 08/09/24

## 2024-08-09 NOTE — TREATMENT TEAM
08/09/24 0751   Team Meeting   Meeting Type Daily Rounds   Initial Conference Date 08/09/24   Team Members Present   Team Members Present Physician;Nurse;;   Physician Team Member Tena Musa, Rehana Borrego PA-C   Nursing Team Member Dona   Care Management Team Member Mercedes   Social Work Team Member Ayse   Patient/Family Present   Patient Present No   Patient's Family Present No     Remains on 1:1 for safety. Patient is a high fall risk. Not attending groups, medications over objection but taking PO. Poor oral intake. Continues with paranoia and AH/VH. Post acute rehab was recommended by PT once the patient clears psychiatrically vs return to home. No discharge date pending at this time.

## 2024-08-09 NOTE — NURSING NOTE
Patient withdrawn to her room, remains on 1:1 for safety. She is guarded and anxious with others. She is cooperative and compliant with evening medications.

## 2024-08-10 LAB
ANION GAP SERPL CALCULATED.3IONS-SCNC: 8 MMOL/L (ref 4–13)
BUN SERPL-MCNC: 9 MG/DL (ref 5–25)
CALCIUM SERPL-MCNC: 9.3 MG/DL (ref 8.4–10.2)
CHLORIDE SERPL-SCNC: 103 MMOL/L (ref 96–108)
CO2 SERPL-SCNC: 29 MMOL/L (ref 21–32)
CREAT SERPL-MCNC: 0.89 MG/DL (ref 0.6–1.3)
GFR SERPL CREATININE-BSD FRML MDRD: 72 ML/MIN/1.73SQ M
GLUCOSE P FAST SERPL-MCNC: 98 MG/DL (ref 65–99)
GLUCOSE SERPL-MCNC: 109 MG/DL (ref 65–140)
GLUCOSE SERPL-MCNC: 116 MG/DL (ref 65–140)
GLUCOSE SERPL-MCNC: 139 MG/DL (ref 65–140)
GLUCOSE SERPL-MCNC: 143 MG/DL (ref 65–140)
GLUCOSE SERPL-MCNC: 98 MG/DL (ref 65–140)
MAGNESIUM SERPL-MCNC: 2.2 MG/DL (ref 1.9–2.7)
POTASSIUM SERPL-SCNC: 3.8 MMOL/L (ref 3.5–5.3)
SODIUM SERPL-SCNC: 140 MMOL/L (ref 135–147)

## 2024-08-10 PROCEDURE — 83735 ASSAY OF MAGNESIUM: CPT | Performed by: NURSE PRACTITIONER

## 2024-08-10 PROCEDURE — 99232 SBSQ HOSP IP/OBS MODERATE 35: CPT

## 2024-08-10 PROCEDURE — 80048 BASIC METABOLIC PNL TOTAL CA: CPT | Performed by: NURSE PRACTITIONER

## 2024-08-10 PROCEDURE — 82948 REAGENT STRIP/BLOOD GLUCOSE: CPT

## 2024-08-10 RX ADMIN — LORAZEPAM 1 MG: 1 TABLET ORAL at 21:11

## 2024-08-10 RX ADMIN — LORAZEPAM 1 MG: 1 TABLET ORAL at 09:28

## 2024-08-10 RX ADMIN — FLUTICASONE FUROATE 1 PUFF: 100 POWDER RESPIRATORY (INHALATION) at 09:31

## 2024-08-10 RX ADMIN — OLANZAPINE 10 MG: 10 TABLET, ORALLY DISINTEGRATING ORAL at 19:36

## 2024-08-10 RX ADMIN — ATORVASTATIN CALCIUM 10 MG: 10 TABLET, FILM COATED ORAL at 09:28

## 2024-08-10 RX ADMIN — NICOTINE 1 PATCH: 7 PATCH, EXTENDED RELEASE TRANSDERMAL at 09:32

## 2024-08-10 RX ADMIN — CLOZAPINE 250 MG: 25 TABLET ORAL at 09:27

## 2024-08-10 RX ADMIN — CYANOCOBALAMIN TAB 1000 MCG 1000 MCG: 1000 TAB at 09:27

## 2024-08-10 RX ADMIN — FLUOXETINE 40 MG: 20 SOLUTION ORAL at 09:28

## 2024-08-10 RX ADMIN — SENNOSIDES AND DOCUSATE SODIUM 1 TABLET: 50; 8.6 TABLET ORAL at 21:11

## 2024-08-10 RX ADMIN — FAMOTIDINE 20 MG: 20 TABLET ORAL at 09:28

## 2024-08-10 RX ADMIN — MELATONIN TAB 3 MG 3 MG: 3 TAB at 21:11

## 2024-08-10 RX ADMIN — POLYETHYLENE GLYCOL 3350 17 G: 17 POWDER, FOR SOLUTION ORAL at 09:33

## 2024-08-10 RX ADMIN — CARVEDILOL 6.25 MG: 6.25 TABLET, FILM COATED ORAL at 09:27

## 2024-08-10 NOTE — NURSING NOTE
"Patient refused evening medications consisting of coreg and Pepcid. She at first was agreeable to taking her medication, asking this writer \"Do you promise there's nothing like drugs in these pills?\" She then gave them back stating \"I'm sorry, I can't. Please go away. I don't feel good.\" Ample encouragement given to patient but she continued to refuse.   "

## 2024-08-10 NOTE — PROGRESS NOTES
"Progress Note - Behavioral Health   Meil Nowak 59 y.o. female MRN: [id number]  Unit/Bed#: [facility] 651-01 Encounter: [id number]      Assessment & Plan   Principal Problem:    Schizoaffective disorder, bipolar type (HCC)  Active Problems:    Medical clearance for psychiatric admission    Type 2 diabetes mellitus (HCC)    Obesity    Tobacco abuse    Hyperlipidemia    Esophageal reflux    Essential (primary) hypertension    Vitamin B12 deficiency    Hypoxia    Ambulatory dysfunction      Subjective:  Patient was seen today for continuation of care, records reviewed and patient was discussed with the morning case review team.  Per staff, patient with improving behaviors and sleeping at night.  Continues to be withdrawn to her room.      Meli was seen today for psychiatric follow-up.  On assessment today, Meli was seen resting in bed.  She reports feeling 'tired\" today.  However she states she is sleeping throughout the night.  She whispers at times and is difficult to understand.  She denied depression and anxiety when asked.  She asked for her clozapine to be changed to \"something else\".  When asked if she was having side effects she states \"I don't know\".  She was encouraged to continue with current medications as she appears to be improving.  She states she is having daily bowel movements, last being yesterday.      Meli denies acute suicidal/self-harm ideation/intent/plan upon direct inquiry at this time.  Meli remains withdrawn but more cooperative no agitation or aggression noted on exam or in report.  Meli also denies HI/AH/VH.  No overt delusions verbalized but she continues to be somewhat paranoid.  Meli remains adherent to her current psychotropic medication regimen and denies any side effects from medications, as well as none noted on exam.    Recommended Treatment: Treatment plan and medication changes discussed and per the attending physician the plan is:    1.Continue with group therapy, milieu therapy and " occupational therapy  2.Behavioral Health checks every 7 minutes  3.Continue frequent safety checks and vitals per unit protocol  4.Continue with SLIM medical management as indicated  5.Per staff, patient sleeping through night and improve fall risks scores to medium risk.  Will trial 1:1 while awake with bed alarms.  Continue current medications.  Clozapine 250mg PO daily (Clozaril monitoring labs to be drawn Monday), Prozac 40mg PO daily, Ativan 0.5mg at lunch and 1mg BID, melatonin 3mg PO at HS,  and Zyprexa 10mg PO at HS.  Patient is medications over objection but taking PO medications at this time.    6.Will review labs in the a.m.  7.Disposition Planning: Discharge planning and efforts remain ongoing    Vitals:  Vitals:    08/10/24 0748   BP: 145/80   Pulse: 83   Resp: 20   Temp:    SpO2: 93%       Laboratory Results:  I have personally reviewed all pertinent laboratory/tests results.  Most Recent Labs:   Lab Results   Component Value Date    WBC 6.75 08/06/2024    RBC 4.95 08/06/2024    HGB 14.7 08/06/2024    HCT 47.0 (H) 08/06/2024     08/06/2024    RDW 15.9 (H) 08/06/2024    NEUTROABS 3.50 08/06/2024    SODIUM 140 08/10/2024    K 3.8 08/10/2024     08/10/2024    CO2 29 08/10/2024    BUN 9 08/10/2024    CREATININE 0.89 08/10/2024    GLUC 98 08/10/2024    GLUF 98 08/10/2024    CALCIUM 9.3 08/10/2024    AST 28 08/06/2024    ALT 26 08/06/2024    ALKPHOS 82 08/06/2024    TP 6.6 08/06/2024    ALB 3.6 08/06/2024    TBILI 0.54 08/06/2024    AMMONIA 32 07/03/2024    [id number] 2.000 07/24/2024    HGBA1C 6.4 (H) 05/03/2024     05/03/2024       Psychiatric Review of Systems:  Behavior over the last 24 hours:  some improvement   Sleep: adequate  Appetite: adequate  Medication side effects: none observed  ROS: no complaints, denies shortness of breath or chest pain and all other systems are negative for acute changes    Mental Status Evaluation:    Appearance:  disheveled, marginal hygiene    Behavior:  calm, guarded   Speech:  scant, soft   Mood:  dysphoric   Affect:  blunted   Thought Process:  less disorganized   Associations: circumstantial associations   Thought Content:  paranoid ideation   Perceptual Disturbances: denies auditory or visual hallucinations when asked, appears preoccupied   Risk Potential: Suicidal ideation - None at present  Homicidal ideation - None at present  Potential for aggression - Not at present   Sensorium:  oriented to person   Memory:  recent memory intact   Consciousness:  alert and awake   Attention/Concentration: attention span and concentration appear shorter than expected for age   Insight:  limited   Judgment: limited   Gait/Station: in bed   Motor Activity: no abnormal movements     Progress Toward Goals:   Meli is progressing towards goals of inpatient psychiatric treatment by continued medication compliance and is attending therapeutic modalities on the milieu. However, the patient continues to require inpatient psychiatric hospitalization for continued medication management and titration to optimize symptom reduction, improve sleep hygiene, and demonstrate adequate self-care.    Risk of Harm to Self:   Nursing Suicide Risk Assessment Last 24 hours: C-SSRS Risk (Since Last Contact)  Calculated C-SSRS Risk Score (Since Last Contact): No Risk Indicated  Current Specific Risk Factors include: mental illness diagnosis  Protective Factors: no current suicidal ideation, ability to communicate with staff on the unit, improved psychotic symptoms, responds to redirection  Based on today's assessment, [place] presents the following risk of harm to self: low    Risk of Harm to Others:  Nursing Homicide Risk Assessment: Violence Risk to Others: Denies within past 6 months  Current Specific Risk Factors include: social difficulties  Protective Factors: no current homicidal ideation, psychotic symptoms are less prominent, compliant with medications on the unit as ordered,  follows staff redirection  Based on today's assessment, Meli presents the following risk of harm to others: low    The following interventions are recommended: behavioral checks every 7 minutes, continued hospitalization on locked unit        Behavioral Health Medications: all current active meds have been reviewed and continue current psychiatric medications.  Current Facility-Administered Medications   Medication Dose Route Frequency Provider Last Rate    acetaminophen  650 mg Oral Q4H PRN Marie Ziegler, CHRISTOPHERNP      acetaminophen  650 mg Oral Q4H PRN Marie Ziegler, CHRISTOPHERNP      acetaminophen  975 mg Oral Q6H PRN Marie Ziegler, JOSE ELIAS      atorvastatin  10 mg Oral Daily Marie Ziegler, CHRISTOPHERNP      bisacodyl  10 mg Rectal Daily PRN Marie Ziegler, JOSE ELIAS      carvedilol  6.25 mg Oral BID With Meals Marie Ziegler, JOSE ELIAS      cloNIDine  0.1 mg Transdermal Weekly Marie Ziegler, JOSE ELIAS      cloZAPine  250 mg Oral Daily Marie Ziegler, JOSE ELIAS      cyanocobalamin  1,000 mcg Oral Daily Marie Ziegler, JOSE ELIAS      famotidine  20 mg Oral BID Shan Fitzgerald DO      FLUoxetine  40 mg Oral Daily Dereje Banuelos MD      fluticasone  1 puff Inhalation Daily JOSE ELIAS Figueroa      hydrOXYzine HCL  25 mg Oral Q6H PRN Max 4/day Marie Ziegler, JOSE ELIAS      hydrOXYzine HCL  50 mg Oral Q6H PRN Max 4/day JOSE ELIAS Figueroa      insulin lispro  1-5 Units Subcutaneous 4x Daily (AC & HS) JOSE ELIAS Figueroa      ipratropium-albuterol  3 mL Nebulization Q4H PRN Darin Lawton MD      LORazepam  0.5 mg Sublingual After Lunch Dereje Banuelos MD      Or    LORazepam  0.5 mg Intramuscular After Lunch Dereje Banuelos MD      LORazepam  1 mg Intramuscular Q6H PRN Max 3/day JOSE ELIAS Figueroa      LORazepam  1 mg Oral BID Dereje Banuelos MD      Or    LORazepam  1 mg Intramuscular BID Dereje Banuelos MD      LORazepam  1 mg Oral Q6H PRN Max 3/day JOSE ELIAS Figueroa      melatonin  3 mg Oral HS JOSE ELIAS Figueroa      Multivitamin  15  mL Oral Daily JOSE ELIAS Figueroa      nicotine  1 patch Transdermal Daily JOSE ELIAS Figueroa      OLANZapine  10 mg Oral HS JOSE ELIAS Figueroa      Or    OLANZapine  5 mg Intramuscular HS JOSE ELIAS Figueroa      OLANZapine  2.5 mg Oral Q6H PRN Dereje Banuelos MD      Or    OLANZapine  2.5 mg Intramuscular Q6H PRN Dereje Banuelos MD      OLANZapine  5 mg Oral Q6H PRN Dereje Banuelos MD      Or    OLANZapine  5 mg Intramuscular Q6H PRN Dereje Banuelos MD      ondansetron  4 mg Oral Q6H PRN Darin Lawton MD      polyethylene glycol  17 g Oral Daily PRN JOSE ELIAS Figueroa      polyethylene glycol  17 g Oral Daily JOSE ELIAS Ortega      senna-docusate sodium  1 tablet Oral HS JOSE ELIAS Figueroa      traZODone  50 mg Oral HS PRN JOSE ELIAS Figueroa         Risks / Benefits of Treatment:  Risks, benefits, and possible side effects of medications explained to patient and patient verbalizes understanding and agreement for treatment.    Counseling / Coordination of Care:  Patient's progress reviewed with nursing staff.  Medications, treatment progress and treatment plan reviewed with patient.  Supportive counseling provided to the patient.    Total floor/unit time spent today 25 minutes. Greater than 50% of total time was spent with the patient and / or family counseling and / or coordination of care. A description of the counseling / coordination of care: medication education, treatment plan, supportive therapy.

## 2024-08-11 LAB
GLUCOSE SERPL-MCNC: 112 MG/DL (ref 65–140)
GLUCOSE SERPL-MCNC: 118 MG/DL (ref 65–140)
GLUCOSE SERPL-MCNC: 136 MG/DL (ref 65–140)

## 2024-08-11 PROCEDURE — 99232 SBSQ HOSP IP/OBS MODERATE 35: CPT

## 2024-08-11 PROCEDURE — 82948 REAGENT STRIP/BLOOD GLUCOSE: CPT

## 2024-08-11 RX ORDER — CLONIDINE 0.1 MG/24H
0.1 PATCH, EXTENDED RELEASE TRANSDERMAL WEEKLY
Status: DISCONTINUED | OUTPATIENT
Start: 2024-08-11 | End: 2024-08-28

## 2024-08-11 RX ADMIN — LORAZEPAM 0.5 MG: 0.5 TABLET ORAL at 14:40

## 2024-08-11 RX ADMIN — LORAZEPAM 1 MG: 1 TABLET ORAL at 08:46

## 2024-08-11 RX ADMIN — FLUTICASONE FUROATE 1 PUFF: 100 POWDER RESPIRATORY (INHALATION) at 09:20

## 2024-08-11 RX ADMIN — LORAZEPAM 1 MG: 2 INJECTION INTRAMUSCULAR; INTRAVENOUS at 21:39

## 2024-08-11 RX ADMIN — ATORVASTATIN CALCIUM 10 MG: 10 TABLET, FILM COATED ORAL at 08:47

## 2024-08-11 RX ADMIN — POLYETHYLENE GLYCOL 3350 17 G: 17 POWDER, FOR SOLUTION ORAL at 08:47

## 2024-08-11 RX ADMIN — FAMOTIDINE 20 MG: 20 TABLET ORAL at 08:47

## 2024-08-11 RX ADMIN — CLONIDINE 0.1 MG: 0.1 PATCH TRANSDERMAL at 12:18

## 2024-08-11 RX ADMIN — CARVEDILOL 6.25 MG: 6.25 TABLET, FILM COATED ORAL at 08:46

## 2024-08-11 RX ADMIN — FAMOTIDINE 20 MG: 20 TABLET ORAL at 17:19

## 2024-08-11 RX ADMIN — TRAZODONE HYDROCHLORIDE 50 MG: 50 TABLET ORAL at 01:16

## 2024-08-11 RX ADMIN — CARVEDILOL 6.25 MG: 6.25 TABLET, FILM COATED ORAL at 17:19

## 2024-08-11 RX ADMIN — Medication 15 ML: at 09:20

## 2024-08-11 RX ADMIN — CLOZAPINE 250 MG: 25 TABLET ORAL at 08:47

## 2024-08-11 RX ADMIN — FLUOXETINE 40 MG: 20 SOLUTION ORAL at 09:20

## 2024-08-11 RX ADMIN — CYANOCOBALAMIN TAB 1000 MCG 1000 MCG: 1000 TAB at 08:47

## 2024-08-11 RX ADMIN — OLANZAPINE 10 MG: 10 TABLET, ORALLY DISINTEGRATING ORAL at 19:20

## 2024-08-11 NOTE — PLAN OF CARE
Problem: Alteration in Thoughts and Perception  Goal: Treatment Goal: Gain control of psychotic behaviors/thinking, reduce/eliminate presenting symptoms and demonstrate improved reality functioning upon discharge  Outcome: Not Progressing  Goal: Verbalize thoughts and feelings  Description: Interventions:  - Promote a nonjudgmental and trusting relationship with the patient through active listening and therapeutic communication  - Assess patient's level of functioning, behavior and potential for risk  - Engage patient in 1 on 1 interactions  - Encourage patient to express fears, feelings, frustrations, and discuss symptoms    - [place] patient to reality, help patient recognize reality-based thinking   - Administer medications as ordered and assess for potential side effects  - Provide the patient education related to the signs and symptoms of the illness and desired effects of prescribed medications  Outcome: Not Progressing  Goal: Refrain from acting on delusional thinking/internal stimuli  Description: Interventions:  - Monitor patient closely, per order   - Utilize least restrictive measures   - Set reasonable limits, give positive feedback for acceptable   - Administer medications as ordered and monitor of potential side effects  Outcome: Not Progressing  Goal: Agree to be compliant with medication regime, as prescribed and report medication side effects  Description: Interventions:  - Offer appropriate PRN medication and supervise ingestion; conduct AIMS, as needed   Outcome: Not Progressing  Goal: Attend and participate in unit activities, including therapeutic, recreational, and educational groups  Description: Interventions:  -Encourage Visitation and family involvement in care  Outcome: Not Progressing  Goal: Recognize dysfunctional thoughts, communicate reality-based thoughts at the time of discharge  Description: Interventions:  - Provide medication and psycho-education to assist patient in compliance  and developing insight into his/her illness   Outcome: Not Progressing  Goal: Complete daily ADLs, including personal hygiene independently, as able  Description: Interventions:  - Observe, teach, and assist patient with ADLS  - Monitor and promote a balance of rest/activity, with adequate nutrition and elimination   Outcome: Not Progressing     Problem: SAFETY,RESTRAINT: NV/NON-SELF DESTRUCTIVE BEHAVIOR  Goal: Remains free of harm/injury (restraint for non violent/non self-detsructive behavior)  Description: INTERVENTIONS:  - Instruct patient/family regarding restraint use   - Assess and monitor physiologic and psychological status   - Provide interventions and comfort measures to meet assessed patient needs   - Identify and implement measures to help patient regain control  - Assess readiness for release of restraint   Outcome: Progressing  Goal: Returns to optimal restraint-free functioning  Description: INTERVENTIONS:  - Assess the patient's behavior and symptoms that indicate continued need for restraint  - Identify and implement measures to help patient regain control  - Assess readiness for release of restraint   Outcome: Progressing     Problem: Potential for Falls  Goal: Patient will remain free of falls  Description: INTERVENTIONS:  - Educate patient on patient safety including physical limitations  - Instruct patient to call for assistance with activity   - Consult OT/PT to assist with strengthening/mobility   - Keep Call bell within reach  - Keep bed low and locked with side rails adjusted as appropriate  - Keep care items and personal belongings within reach  - Initiate and maintain comfort rounds  - Offer Toileting every 2 Hours, in advance of need  - Initiate/Maintain bed and chair alarm  - Obtain necessary fall risk management equipment: walker, wheelchair  - Apply yellow socks and bracelet for high fall risk patients  - Patient moved to room near nurses station  Outcome: Progressing

## 2024-08-11 NOTE — NURSING NOTE
Pt present on the unit and often pacing the hallway. Appears periodically confused but redirectable. Continues to with paranoia, AH, and delusional dreams that the world is ending soon. Continues on 1:1 while awake for safety. Medication compliant with extensive encouragement. Currently not endorsing any intent or tendencies to harm self or others.

## 2024-08-11 NOTE — NURSING NOTE
"Patient withdrawn to room this shift. Patient paranoid and anxious on approach stating \"I am ugly and I smell, are you sure I'm okay?\" Patient repetitive stating she's \"not okay.\" Patient reassured. Patient denied SI/HI, but did report AH stating \"the voices get so loud, they pound.\" Patient accepted scheduled medications. Patient refusing to come out of room for meals, but ate 25% of lunch. Patient remains on 1:1 for safety and fall risk. Patient able to and encouraged to inform staff of any needs.   "

## 2024-08-11 NOTE — NURSING NOTE
"Patient's O2 saturation notably low and patient displayed increased work of breathing. O2 Sat at 90% Patient placed on 3 L O2 and O2 sat increased to 95%. Patient paranoid and anxious. Patient able to be verbally redirected, and stated she felt \"better.\"  "

## 2024-08-11 NOTE — PROGRESS NOTES
"Progress Note - Behavioral Health   Meli Nowak 59 y.o. female MRN: [id number]  Unit/Bed#: [facility] 651-01 Encounter: [id number]      Assessment & Plan   Principal Problem:    Schizoaffective disorder, bipolar type (HCC)  Active Problems:    Medical clearance for psychiatric admission    Type 2 diabetes mellitus (HCC)    Obesity    Tobacco abuse    Hyperlipidemia    Esophageal reflux    Essential (primary) hypertension    Vitamin B12 deficiency    Hypoxia    Ambulatory dysfunction      Subjective:  Patient was seen today for continuation of care, records reviewed and patient was discussed with the morning case review team.  Per staff, Meli refused Coreg and Pepcid yesterday but took her psychotropic medications with encouragement.  She received Trazodone PRN last night for sleep.      Meli was seen today for psychiatric follow-up.  On assessment today, Meli was seen resting in bed.  She reports today that she \"I is not feeling good\".  When asked to explain what does not feel good she states \"I am getting kicked out the hospital today\".  Reassurance provided but Meli continued to be perseverative about being kicked out the hospital.  She remains paranoid and delusional.       Meli denies acute suicidal/self-harm ideation/intent/plan upon direct inquiry at this time.  Meli remains anxious, paranoid and delusional.  Meli also denies HI/AH/VH during the interview but appears internally preoccupied.    Meli remains adherent to her current psychotropic medication regimen with encouragement and no side effects noted on exam.    Recommended Treatment: Treatment plan and medication changes discussed and per the attending physician the plan is:    1.Continue with group therapy, milieu therapy and occupational therapy  2.Behavioral Health checks every 7 minutes  3.Continue frequent safety checks and vitals per unit protocol  4.Continue with SLIM medical management as indicated  5.Continue 1:1 while awake with bed alarms for " safety.  Continue  Clozapine 250mg PO daily (Clozaril monitoring labs to be drawn Monday), Prozac 40mg PO daily, Ativan 0.5mg at lunch and 1mg BID, melatonin 3mg PO at HS,  and Zyprexa 10mg PO at HS.  Patient is medications over objection but taking PO medications at this time with encouragement.    6.Will review labs in the a.m.  7.Disposition Planning: Discharge planning and efforts remain ongoing    Vitals:  Vitals:    08/11/24 0750   BP: 116/69   Pulse: 87   Resp: 16   Temp: 97.6 °F (36.4 °C)   SpO2: 95%       Laboratory Results:  I have personally reviewed all pertinent laboratory/tests results.  Most Recent Labs:   Lab Results   Component Value Date    WBC 6.75 08/06/2024    RBC 4.95 08/06/2024    HGB 14.7 08/06/2024    HCT 47.0 (H) 08/06/2024     08/06/2024    RDW 15.9 (H) 08/06/2024    NEUTROABS 3.50 08/06/2024    SODIUM 140 08/10/2024    K 3.8 08/10/2024     08/10/2024    CO2 29 08/10/2024    BUN 9 08/10/2024    CREATININE 0.89 08/10/2024    GLUC 98 08/10/2024    GLUF 98 08/10/2024    CALCIUM 9.3 08/10/2024    AST 28 08/06/2024    ALT 26 08/06/2024    ALKPHOS 82 08/06/2024    TP 6.6 08/06/2024    ALB 3.6 08/06/2024    TBILI 0.54 08/06/2024    AMMONIA 32 07/03/2024    [id number] 2.000 07/24/2024    HGBA1C 6.4 (H) 05/03/2024     05/03/2024       Psychiatric Review of Systems:  Behavior over the last 24 hours:  some improvement   Sleep: adequate  Appetite: adequate  Medication side effects: none observed  ROS: no complaints, denies shortness of breath or chest pain and all other systems are negative for acute changes    Mental Status Evaluation:    Appearance:  disheveled, marginal hygiene   Behavior:  guarded, restless   Speech:  scant, perseverative   Mood:  anxious   Affect:  constricted   Thought Process:  disorganized, illogical, perseverative, negative thinking   Associations: circumstantial associations   Thought Content:  paranoid delusions, paranoid ideation   Perceptual  Disturbances: denies auditory or visual hallucinations when asked, appears preoccupied   Risk Potential: Suicidal ideation - None at present  Homicidal ideation - None at present  Potential for aggression - Not at present   Sensorium:  oriented to person   Memory:  recent memory intact   Consciousness:  alert and awake   Attention/Concentration: attention span and concentration appear shorter than expected for age   Insight:  limited   Judgment: limited   Gait/Station: in bed   Motor Activity: no abnormal movements     Progress Toward Goals:   Meli is progressing towards goals of inpatient psychiatric treatment by continued medication compliance and is attending therapeutic modalities on the milieu. However, the patient continues to require inpatient psychiatric hospitalization for continued medication management and titration to optimize symptom reduction, improve sleep hygiene, and demonstrate adequate self-care.    Risk of Harm to Self:   Nursing Suicide Risk Assessment Last 24 hours: C-SSRS Risk (Since Last Contact)  Calculated C-SSRS Risk Score (Since Last Contact): No Risk Indicated  Current Specific Risk Factors include: mental illness diagnosis  Protective Factors: no current suicidal ideation, ability to communicate with staff on the unit, improved psychotic symptoms, responds to redirection  Based on today's assessment, [place] presents the following risk of harm to self: low    Risk of Harm to Others:  Nursing Homicide Risk Assessment: Violence Risk to Others: Denies within past 6 months  Current Specific Risk Factors include: social difficulties  Protective Factors: no current homicidal ideation, psychotic symptoms are less prominent, compliant with medications on the unit as ordered, follows staff redirection  Based on today's assessment, [place] presents the following risk of harm to others: low    The following interventions are recommended: behavioral checks every 7 minutes, continued hospitalization on  locked unit        Behavioral Health Medications: all current active meds have been reviewed and continue current psychiatric medications.  Current Facility-Administered Medications   Medication Dose Route Frequency Provider Last Rate    acetaminophen  650 mg Oral Q4H PRN Marie Ziegler, JOSE ELIAS      acetaminophen  650 mg Oral Q4H PRN Marie Ziegler, JOSE ELIAS      acetaminophen  975 mg Oral Q6H PRN JOSE ELIAS Figueroa      atorvastatin  10 mg Oral Daily Marie Ziegler, JOSE ELIAS      bisacodyl  10 mg Rectal Daily PRN JOSE ELIAS Figueroa      carvedilol  6.25 mg Oral BID With Meals JOSE ELIAS Figueroa      cloNIDine  0.1 mg Transdermal Weekly JOSE ELIAS Figueroa      cloZAPine  250 mg Oral Daily JOSE ELIAS Figueroa      cyanocobalamin  1,000 mcg Oral Daily JOSE ELIAS Figueroa      famotidine  20 mg Oral BID Shan Fitzgerald DO      FLUoxetine  40 mg Oral Daily Dereje Banuelos MD      fluticasone  1 puff Inhalation Daily JOSE ELIAS Figueroa      hydrOXYzine HCL  25 mg Oral Q6H PRN Max 4/day JOSE ELIAS Figueroa      hydrOXYzine HCL  50 mg Oral Q6H PRN Max 4/day JOSE ELIAS Figueroa      insulin lispro  1-5 Units Subcutaneous 4x Daily (AC & HS) JOSE ELIAS Figueroa      ipratropium-albuterol  3 mL Nebulization Q4H PRN Darin Lawton MD      LORazepam  0.5 mg Sublingual After Lunch Dereje Banuelos MD      Or    LORazepam  0.5 mg Intramuscular After Lunch Dereje Banuelos MD      LORazepam  1 mg Intramuscular Q6H PRN Max 3/day JOSE LEIAS Figueroa      LORazepam  1 mg Oral BID Dereje Banuelos MD      Or    LORazepam  1 mg Intramuscular BID Dereje Banuelos MD      LORazepam  1 mg Oral Q6H PRN Max 3/day JOSE ELIAS Figueroa      melatonin  3 mg Oral HS JOSE ELIAS Figueroa      Multivitamin  15 mL Oral Daily JOSE ELIAS Figueroa      nicotine  1 patch Transdermal Daily JOSE ELIAS Figueroa      OLANZapine  10 mg Oral HS JOSE ELIAS Figueroa      Or    OLANZapine  5 mg Intramuscular HS JOSE ELIAS Figueroa       OLANZapine  2.5 mg Oral Q6H PRN Dereje Banuelos MD      Or    OLANZapine  2.5 mg Intramuscular Q6H PRN Dereje Banuelos MD      OLANZapine  5 mg Oral Q6H PRN Dereje Banuelos MD      Or    OLANZapine  5 mg Intramuscular Q6H PRN Dereje Banuelos MD      ondansetron  4 mg Oral Q6H PRN Darin Lawton MD      polyethylene glycol  17 g Oral Daily PRN JOSE ELIAS Figueroa      polyethylene glycol  17 g Oral Daily JOES ELIAS Ortega      senna-docusate sodium  1 tablet Oral HS JOSE ELIAS Figueroa      traZODone  50 mg Oral HS PRN JOSE ELIAS Figueroa         Risks / Benefits of Treatment:  Risks, benefits, and possible side effects of medications explained to patient and patient verbalizes understanding and agreement for treatment.    Counseling / Coordination of Care:  Patient's progress reviewed with nursing staff.  Medications, treatment progress and treatment plan reviewed with patient.  Supportive counseling provided to the patient.    Total floor/unit time spent today 25 minutes. Greater than 50% of total time was spent with the patient and / or family counseling and / or coordination of care. A description of the counseling / coordination of care: medication education, treatment plan, supportive therapy.

## 2024-08-12 LAB
BASOPHILS # BLD AUTO: 0.08 THOUSANDS/ΜL (ref 0–0.1)
BASOPHILS NFR BLD AUTO: 1 % (ref 0–1)
CK SERPL-CCNC: 121 U/L (ref 26–192)
CRP SERPL QL: 2.8 MG/L
EOSINOPHIL # BLD AUTO: 0 THOUSAND/ΜL (ref 0–0.61)
EOSINOPHIL NFR BLD AUTO: 0 % (ref 0–6)
ERYTHROCYTE [DISTWIDTH] IN BLOOD BY AUTOMATED COUNT: 15.6 % (ref 11.6–15.1)
GLUCOSE SERPL-MCNC: 116 MG/DL (ref 65–140)
GLUCOSE SERPL-MCNC: 117 MG/DL (ref 65–140)
GLUCOSE SERPL-MCNC: 127 MG/DL (ref 65–140)
HCT VFR BLD AUTO: 44 % (ref 34.8–46.1)
HGB BLD-MCNC: 14.2 G/DL (ref 11.5–15.4)
IMM GRANULOCYTES # BLD AUTO: 0.01 THOUSAND/UL (ref 0–0.2)
IMM GRANULOCYTES NFR BLD AUTO: 0 % (ref 0–2)
LYMPHOCYTES # BLD AUTO: 2.07 THOUSANDS/ΜL (ref 0.6–4.47)
LYMPHOCYTES NFR BLD AUTO: 29 % (ref 14–44)
MCH RBC QN AUTO: 29.9 PG (ref 26.8–34.3)
MCHC RBC AUTO-ENTMCNC: 32.3 G/DL (ref 31.4–37.4)
MCV RBC AUTO: 93 FL (ref 82–98)
MONOCYTES # BLD AUTO: 0.7 THOUSAND/ΜL (ref 0.17–1.22)
MONOCYTES NFR BLD AUTO: 10 % (ref 4–12)
NEUTROPHILS # BLD AUTO: 4.26 THOUSANDS/ΜL (ref 1.85–7.62)
NEUTS SEG NFR BLD AUTO: 60 % (ref 43–75)
NRBC BLD AUTO-RTO: 0 /100 WBCS
PLATELET # BLD AUTO: 278 THOUSANDS/UL (ref 149–390)
PMV BLD AUTO: 8.8 FL (ref 8.9–12.7)
RBC # BLD AUTO: 4.75 MILLION/UL (ref 3.81–5.12)
WBC # BLD AUTO: 7.12 THOUSAND/UL (ref 4.31–10.16)

## 2024-08-12 PROCEDURE — 86140 C-REACTIVE PROTEIN: CPT

## 2024-08-12 PROCEDURE — 82550 ASSAY OF CK (CPK): CPT

## 2024-08-12 PROCEDURE — 97116 GAIT TRAINING THERAPY: CPT

## 2024-08-12 PROCEDURE — 99232 SBSQ HOSP IP/OBS MODERATE 35: CPT | Performed by: PSYCHIATRY & NEUROLOGY

## 2024-08-12 PROCEDURE — 85025 COMPLETE CBC W/AUTO DIFF WBC: CPT

## 2024-08-12 PROCEDURE — 82948 REAGENT STRIP/BLOOD GLUCOSE: CPT

## 2024-08-12 RX ADMIN — LORAZEPAM 0.5 MG: 0.5 TABLET ORAL at 14:29

## 2024-08-12 RX ADMIN — ATORVASTATIN CALCIUM 10 MG: 10 TABLET, FILM COATED ORAL at 09:02

## 2024-08-12 RX ADMIN — CARVEDILOL 6.25 MG: 6.25 TABLET, FILM COATED ORAL at 09:53

## 2024-08-12 RX ADMIN — FLUOXETINE 40 MG: 20 SOLUTION ORAL at 09:53

## 2024-08-12 RX ADMIN — LORAZEPAM 1 MG: 1 TABLET ORAL at 21:19

## 2024-08-12 RX ADMIN — MELATONIN TAB 3 MG 3 MG: 3 TAB at 21:20

## 2024-08-12 RX ADMIN — FAMOTIDINE 20 MG: 20 TABLET ORAL at 09:53

## 2024-08-12 RX ADMIN — OLANZAPINE 10 MG: 10 TABLET, ORALLY DISINTEGRATING ORAL at 19:54

## 2024-08-12 RX ADMIN — Medication 15 ML: at 09:07

## 2024-08-12 RX ADMIN — SENNOSIDES AND DOCUSATE SODIUM 1 TABLET: 50; 8.6 TABLET ORAL at 21:20

## 2024-08-12 RX ADMIN — FAMOTIDINE 20 MG: 20 TABLET ORAL at 17:18

## 2024-08-12 RX ADMIN — CARVEDILOL 6.25 MG: 6.25 TABLET, FILM COATED ORAL at 17:18

## 2024-08-12 RX ADMIN — CLOZAPINE 250 MG: 25 TABLET ORAL at 09:53

## 2024-08-12 RX ADMIN — CYANOCOBALAMIN TAB 1000 MCG 1000 MCG: 1000 TAB at 09:53

## 2024-08-12 RX ADMIN — LORAZEPAM 1 MG: 1 TABLET ORAL at 09:53

## 2024-08-12 NOTE — TREATMENT TEAM
08/12/24 0751   Team Meeting   Meeting Type Daily Rounds   Initial Conference Date 08/12/24   Team Members Present   Team Members Present Physician;Nurse;;   Physician Team Member Dr. Armando, Rehana Borrego PA-C   Nursing Team Member Clarissa   Care Management Team Member Mercedes   Social Work Team Member Ayse   Patient/Family Present   Patient Present No   Patient's Family Present No     The patient continues with paranoid delusions, remains on 1:1 while awake for safety, patient is impulsive. Patient is meds over objection, but taking PO meds at times. Clozaril to be increased. Discharge pending post acute rehab vs home once psychiatrically stable.

## 2024-08-12 NOTE — CASE MANAGEMENT
CM received the level of care determination from [facility]. The patient is nursing home clinically eligible. Depending on the patient's continued improvement and PT evaluation, CM will either refer the patient for post acute rehab services vs discharge to home.

## 2024-08-12 NOTE — NURSING NOTE
Patient awakened and found in bathroom with bed alarm sounding and soiled brief on floor near bed.  She voided and returned to bed.  O2 was 86-88%. Nasal Cannula placed on patient with 3L O2.  Oxygen saturation increased to 93%.  Will remain in place until stabilized and continue to be monitored.      Patient returned to bed with no further issue and safety plan reviewed and she acknowledged understanding.

## 2024-08-12 NOTE — PLAN OF CARE
Problem: DISCHARGE PLANNING - CARE MANAGEMENT  Goal: Discharge to post-acute care or home with appropriate resources  Description: INTERVENTIONS:  - Conduct assessment to determine patient/family and health care team treatment goals, and need for post-acute services based on payer coverage, community resources, and patient preferences, and barriers to discharge  - Address psychosocial, clinical, and financial barriers to discharge as identified in assessment in conjunction with the patient/family and health care team  - Arrange appropriate level of post-acute services according to patient’s   needs and preference and payer coverage in collaboration with the physician and health care team  - Communicate with and update the patient/family, physician, and health care team regarding progress on the discharge plan  - Arrange appropriate transportation to post-acute venues  Outcome: Progressing     Problem: Prexisting or High Potential for Compromised Skin Integrity  Goal: Skin integrity is maintained or improved  Description: INTERVENTIONS:  - Identify patients at risk for skin breakdown  - Assess and monitor skin integrity  - Assess and monitor nutrition and hydration status  - Monitor labs   - Assess for incontinence   - Turn and reposition patient  - Assist with mobility/ambulation  - Relieve pressure over bony prominences  - Avoid friction and shearing  - Provide appropriate hygiene as needed including keeping skin clean and dry  - Evaluate need for skin moisturizer/barrier cream  - Collaborate with interdisciplinary team   - Patient/family teaching  - Consider wound care consult   Outcome: Progressing     Problem: Alteration in Thoughts and Perception  Goal: Treatment Goal: Gain control of psychotic behaviors/thinking, reduce/eliminate presenting symptoms and demonstrate improved reality functioning upon discharge  Outcome: Progressing  Goal: Verbalize thoughts and feelings  Description: Interventions:  - Promote  a nonjudgmental and trusting relationship with the patient through active listening and therapeutic communication  - Assess patient's level of functioning, behavior and potential for risk  - Engage patient in 1 on 1 interactions  - Encourage patient to express fears, feelings, frustrations, and discuss symptoms    - [place] patient to reality, help patient recognize reality-based thinking   - Administer medications as ordered and assess for potential side effects  - Provide the patient education related to the signs and symptoms of the illness and desired effects of prescribed medications  Outcome: Progressing  Goal: Refrain from acting on delusional thinking/internal stimuli  Description: Interventions:  - Monitor patient closely, per order   - Utilize least restrictive measures   - Set reasonable limits, give positive feedback for acceptable   - Administer medications as ordered and monitor of potential side effects  Outcome: Progressing  Goal: Agree to be compliant with medication regime, as prescribed and report medication side effects  Description: Interventions:  - Offer appropriate PRN medication and supervise ingestion; conduct AIMS, as needed   Outcome: Progressing  Goal: Recognize dysfunctional thoughts, communicate reality-based thoughts at the time of discharge  Description: Interventions:  - Provide medication and psycho-education to assist patient in compliance and developing insight into his/her illness   Outcome: Progressing  Goal: Complete daily ADLs, including personal hygiene independently, as able  Description: Interventions:  - Observe, teach, and assist patient with ADLS  - Monitor and promote a balance of rest/activity, with adequate nutrition and elimination   Outcome: Progressing     Problem: Ineffective Coping  Goal: Identifies ineffective coping skills  Outcome: Progressing  Goal: Demonstrates healthy coping skills  Outcome: Progressing  Goal: Patient/Family participate in treatment and DC  plans  Description: Interventions:  - Provide therapeutic environment  Outcome: Progressing  Goal: Patient/Family verbalizes awareness of resources  Outcome: Progressing  Goal: Understands least restrictive measures  Description: Interventions:  - Utilize least restrictive behavior  Outcome: Progressing  Goal: Free from restraint events  Description: - Utilize least restrictive measures   - Provide behavioral interventions   - Redirect inappropriate behaviors   Outcome: Progressing     Problem: Risk for Self Injury/Neglect  Goal: Treatment Goal: Remain safe during length of stay, learn and adopt new coping skills, and be free of self-injurious ideation, impulses and acts at the time of discharge  Outcome: Progressing  Goal: Verbalize thoughts and feelings  Description: Interventions:  - Assess and re-assess patient's lethality and potential for self-injury  - Engage patient in 1:1 interactions, daily, for a minimum of 15 minutes  - Encourage patient to express feelings, fears, frustrations, hopes  - Establish rapport/trust with patient   Outcome: Progressing  Goal: Refrain from harming self  Description: Interventions:  - Monitor patient closely, per order  - Develop a trusting relationship  - Supervise medication ingestion, monitor effects and side effects   Outcome: Progressing  Goal: Recognize maladaptive responses and adopt new coping mechanisms  Outcome: Progressing     Problem: Depression  Goal: Treatment Goal: Demonstrate behavioral control of depressive symptoms, verbalize feelings of improved mood/affect, and adopt new coping skills prior to discharge  Outcome: Progressing  Goal: Verbalize thoughts and feelings  Description: Interventions:  - Assess and re-assess patient's level of risk   - Engage patient in 1:1 interactions, daily, for a minimum of 15 minutes   - Encourage patient to express feelings, fears, frustrations, hopes   Outcome: Progressing  Goal: Refrain from isolation  Description:  Interventions:  - Develop a trusting relationship   - Encourage socialization   Outcome: Progressing  Goal: Refrain from self-neglect  Outcome: Progressing     Problem: Anxiety  Goal: Anxiety is at manageable level  Description: Interventions:  - Assess and monitor patient's anxiety level.   - Monitor for signs and symptoms (heart palpitations, chest pain, shortness of breath, headaches, nausea, feeling jumpy, restlessness, irritable, apprehensive).   - Collaborate with interdisciplinary team and initiate plan and interventions as ordered.  - [place] patient to unit/surroundings  - Explain treatment plan  - Encourage participation in care  - Encourage verbalization of concerns/fears  - Identify coping mechanisms  - Assist in developing anxiety-reducing skills  - Administer/offer alternative therapies  - Limit or eliminate stimulants  Outcome: Progressing     Problem: SAFETY,RESTRAINT: NV/NON-SELF DESTRUCTIVE BEHAVIOR  Goal: Remains free of harm/injury (restraint for non violent/non self-detsructive behavior)  Description: INTERVENTIONS:  - Instruct patient/family regarding restraint use   - Assess and monitor physiologic and psychological status   - Provide interventions and comfort measures to meet assessed patient needs   - Identify and implement measures to help patient regain control  - Assess readiness for release of restraint   Outcome: Progressing  Goal: Returns to optimal restraint-free functioning  Description: INTERVENTIONS:  - Assess the patient's behavior and symptoms that indicate continued need for restraint  - Identify and implement measures to help patient regain control  - Assess readiness for release of restraint   Outcome: Progressing     Problem: Potential for Falls  Goal: Patient will remain free of falls  Description: INTERVENTIONS:  - Educate patient on patient safety including physical limitations  - Instruct patient to call for assistance with activity   - Consult OT/PT to assist with  strengthening/mobility   - Keep Call bell within reach  - Keep bed low and locked with side rails adjusted as appropriate  - Keep care items and personal belongings within reach  - Initiate and maintain comfort rounds  - Offer Toileting every 2 Hours, in advance of need  - Initiate/Maintain bed and chair alarm  - Obtain necessary fall risk management equipment: walker, wheelchair  - Apply yellow socks and bracelet for high fall risk patients  - Patient moved to room near nurses station  Outcome: Progressing     Problem: Nutrition/Hydration-ADULT  Goal: Nutrient/Hydration intake appropriate for improving, restoring or maintaining nutritional needs  Description: Monitor and assess patient's nutrition/hydration status for malnutrition. Collaborate with interdisciplinary team and initiate plan and interventions as ordered.  Monitor patient's weight and dietary intake as ordered or per policy. Utilize nutrition screening tool and intervene as necessary. Determine patient's food preferences and provide high-protein, high-caloric foods as appropriate.     INTERVENTIONS:  - Monitor oral intake, urinary output, labs, and treatment plans  - Assess nutrition and hydration status and recommend course of action  - Evaluate amount of meals eaten  - Assist patient with eating if necessary   - Allow adequate time for meals  - Recommend/ encourage appropriate diets, oral nutritional supplements, and vitamin/mineral supplements  - Order, calculate, and assess calorie counts as needed  - Recommend, monitor, and adjust tube feedings and TPN/PPN based on assessed needs  - Assess need for intravenous fluids  - Provide specific nutrition/hydration education as appropriate  - Include patient/family/caregiver in decisions related to nutrition  Outcome: Progressing      Never smoker

## 2024-08-12 NOTE — PROGRESS NOTES
"Progress Note - Behavioral Health     Meli Nowak 59 y.o. female MRN: [id number]   Unit/Bed#: [facility] 651-01 Encounter: [id number]    Behavior over the last 24 hours: unchanged.     Meli was seen today in follow-up for continuation of care. Per staff, no acute events overnight. Still remains on 1:1 supervision while awake for safety reasons. This morning was found in the bathroom with soiled brief near bed. Still remains impulsive at times. Meli is seen resting in bed in presence of 1:1 observer. She remains minimal and uncooperative with evaluation. Often will stop responding to writers questions. States, \"Okay I am done now.\" Moods remain labile and irritable.  She does note respond when questioned about AVH, however per chart review over the weekend mentioned to staff the voices were, \"loud.\" Meli has been complaint with medications and tolerating without any side effects. She was non-complaint with Clozaril blood work this morning.     Sleep: normal  Appetite: normal, fair  Medication side effects: No   ROS: no complaints, all other systems are negative    Mental Status Evaluation:    Appearance:  disheveled, wearing hospital clothes, eyes closed, poor eye contact, recently showered   Behavior:  uncooperative, dismissive, brief, minimal   Speech:  scant, soft, does not want to talk, whispering   Mood:  euthymic, \"fine\"   Affect:  labile, mood-incongruent   Thought Process:  disorganized   Associations: concrete associations   Thought Content:  paranoid ideation   Perceptual Disturbances: no auditory hallucinations, no visual hallucinations, appears preoccupied   Risk Potential: Suicidal ideation - None at present  Homicidal ideation - None at present  Potential for aggression - No   Sensorium:  oriented to person, place, and time/date   Memory:  recent and remote memory grossly intact   Consciousness:  alert and awake   Attention/Concentration: decreased concentration and decreased attention span   Insight:  " poor   Judgment: poor   Gait/Station: in bed   Motor Activity: no abnormal movements     Vital signs in last 24 hours:    Temp:  [97.3 °F (36.3 °C)-98.1 °F (36.7 °C)] 97.7 °F (36.5 °C)  HR:  [] 106  Resp:  [17-20] 17  BP: (108-131)/(59-72) 131/62    Laboratory results: I have personally reviewed all pertinent laboratory/tests results    Results from the past 24 hours:   Recent Results (from the past 24 hour(s))   Fingerstick Glucose (POCT)    Collection Time: 08/11/24  4:04 PM   Result Value Ref Range    POC Glucose 118 65 - 140 mg/dl   Fingerstick Glucose (POCT)    Collection Time: 08/12/24  7:14 AM   Result Value Ref Range    POC Glucose 116 65 - 140 mg/dl   Fingerstick Glucose (POCT)    Collection Time: 08/12/24 11:35 AM   Result Value Ref Range    POC Glucose 127 65 - 140 mg/dl       Progress Toward Goals: poor insight    Assessment & Plan   Principal Problem:    Schizoaffective disorder, bipolar type (HCC)  Active Problems:    Medical clearance for psychiatric admission    Type 2 diabetes mellitus (HCC)    Obesity    Tobacco abuse    Hyperlipidemia    Esophageal reflux    Essential (primary) hypertension    Vitamin B12 deficiency    Hypoxia    Ambulatory dysfunction      Recommended Treatment:     Planned medication and treatment changes:    All current active medications have been reviewed  Encourage group therapy, milieu therapy and occupational therapy  Behavioral Health checks every 7 minutes    For now, will hold off on Clozaril adjustments after she refused blood work. Will continue to encourage compliance. Will continue 1:1 while awake for safety concerns and impulsivity.    Discharge planning ongoing - recently determined to be nursing home clinically eligible. Most likely require discharge for post-acute rehab when psychiatrically stable.     Current Facility-Administered Medications   Medication Dose Route Frequency Provider Last Rate    acetaminophen  650 mg Oral Q4H PRN JOSE ELIAS Figueroa       acetaminophen  650 mg Oral Q4H PRN Marie Ziegler, CRNP      acetaminophen  975 mg Oral Q6H PRN Marie Ziegler, CRNP      atorvastatin  10 mg Oral Daily Marie Ziegler, CRNP      bisacodyl  10 mg Rectal Daily PRN Marie Ziegler, CRNP      carvedilol  6.25 mg Oral BID With Meals Marie Ziegler, CRNP      cloNIDine  0.1 mg Transdermal Weekly Marie Ziegler, CRNP      cloZAPine  250 mg Oral Daily Marie Ziegler, CRNP      cyanocobalamin  1,000 mcg Oral Daily Marie Ziegler, CRNP      famotidine  20 mg Oral BID Shan Fitzgerald, DO      FLUoxetine  40 mg Oral Daily Dereje Banuelos MD      fluticasone  1 puff Inhalation Daily Marie Ziegler, CRVALE      hydrOXYzine HCL  25 mg Oral Q6H PRN Max 4/day Marie Ziegler, CRNP      hydrOXYzine HCL  50 mg Oral Q6H PRN Max 4/day Marie Ziegler, JOSE ELIAS      ipratropium-albuterol  3 mL Nebulization Q4H PRN Darin Lawton MD      LORazepam  0.5 mg Sublingual After Lunch Dereje Banuelos MD      Or    LORazepam  0.5 mg Intramuscular After Lunch Dereje Banuelos MD      LORazepam  1 mg Intramuscular Q6H PRN Max 3/day Marie Ziegler, JOSE ELIAS      LORazepam  1 mg Oral BID Dereje Banuelos MD      Or    LORazepam  1 mg Intramuscular BID Dereje Banuelos MD      LORazepam  1 mg Oral Q6H PRN Max 3/day Marie Ziegler, JOSE ELIAS      melatonin  3 mg Oral HS Marie Ziegler, CRNP      Multivitamin  15 mL Oral Daily Marie Ziegler, CRNP      nicotine  1 patch Transdermal Daily Marie Ziegler, CRNP      OLANZapine  10 mg Oral HS aMrie Ziegler, CRNP      Or    OLANZapine  5 mg Intramuscular HS Marie Ziegler, CRNP      OLANZapine  2.5 mg Oral Q6H PRN Dereje Banuelos MD      Or    OLANZapine  2.5 mg Intramuscular Q6H PRN Dereje Banuelos MD      OLANZapine  5 mg Oral Q6H PRN Dereje Banuelos MD      Or    OLANZapine  5 mg Intramuscular Q6H PRN Dereje Banuelos MD      ondansetron  4 mg Oral Q6H PRN Darin Lawton MD      polyethylene glycol  17 g Oral Daily PRN Marie Ziegler, CRNP       polyethylene glycol  17 g Oral Daily JOSE ELIAS Ortega      senna-docusate sodium  1 tablet Oral HS JOSE ELIAS Figueroa      traZODone  50 mg Oral HS PRN JOSE ELIAS Figueroa       Risks / Benefits of Treatment:    Risks, benefits, and possible side effects of medications explained to patient and patient verbalizes understanding and agreement for treatment.    Counseling / Coordination of Care:    Total floor / unit time spent today 20 minutes. Greater than 50% of total time was spent with the patient and / or family counseling and / or coordination of care. A description of counseling / coordination of care:  Patient's progress discussed with staff in treatment team meeting.  Medications, treatment progress and treatment plan reviewed with patient.    Rehana Borrego PA-C 08/12/24

## 2024-08-12 NOTE — NURSING NOTE
"Patient withdrawn to room at start of shift. Patient paranoid and delusional on approach asking \"Is this going to kill me?\" in reference to her morning medication. Patient initally put AM medications in her mouth, but then spit them out into water. Limits set, and medications re-pulled from [employer]. Patient showered with staff assistance and then accepted AM medications. Patient became less guarded and delusional as day progressed, accepting all scheduled medications. Patient had three loose stools this shift. Fluids encouraged. Patient's gait steady as she became more visible in milieu. Patient currently pacing halls with 1:1 staff. Patient able to and encouraged to inform staff of any needs.   "

## 2024-08-12 NOTE — NURSING NOTE
Pt continues with paranoid delusions. Pt refused HS scheduled medications. Ativan 1mg given IM per alternative orders. 1:1 maintained for safety and support.

## 2024-08-13 LAB
CLOZAPINE SERPL-MCNC: 312 NG/ML (ref 350–900)
GLUCOSE SERPL-MCNC: 100 MG/DL (ref 65–140)
GLUCOSE SERPL-MCNC: 110 MG/DL (ref 65–140)
GLUCOSE SERPL-MCNC: 120 MG/DL (ref 65–140)

## 2024-08-13 PROCEDURE — 80159 DRUG ASSAY CLOZAPINE: CPT | Performed by: PSYCHIATRY & NEUROLOGY

## 2024-08-13 PROCEDURE — 82948 REAGENT STRIP/BLOOD GLUCOSE: CPT

## 2024-08-13 PROCEDURE — 92526 ORAL FUNCTION THERAPY: CPT

## 2024-08-13 PROCEDURE — 99232 SBSQ HOSP IP/OBS MODERATE 35: CPT | Performed by: PSYCHIATRY & NEUROLOGY

## 2024-08-13 RX ORDER — OLANZAPINE 10 MG/2ML
5 INJECTION, POWDER, FOR SOLUTION INTRAMUSCULAR
Status: DISCONTINUED | OUTPATIENT
Start: 2024-08-13 | End: 2024-08-14

## 2024-08-13 RX ORDER — OLANZAPINE 5 MG/1
5 TABLET, ORALLY DISINTEGRATING ORAL
Status: DISCONTINUED | OUTPATIENT
Start: 2024-08-13 | End: 2024-08-14

## 2024-08-13 RX ADMIN — FLUTICASONE FUROATE 1 PUFF: 100 POWDER RESPIRATORY (INHALATION) at 08:48

## 2024-08-13 RX ADMIN — ATORVASTATIN CALCIUM 10 MG: 10 TABLET, FILM COATED ORAL at 08:41

## 2024-08-13 RX ADMIN — FAMOTIDINE 20 MG: 20 TABLET ORAL at 17:55

## 2024-08-13 RX ADMIN — ONDANSETRON 4 MG: 4 TABLET, ORALLY DISINTEGRATING ORAL at 19:14

## 2024-08-13 RX ADMIN — CARVEDILOL 6.25 MG: 6.25 TABLET, FILM COATED ORAL at 08:40

## 2024-08-13 RX ADMIN — SENNOSIDES AND DOCUSATE SODIUM 1 TABLET: 50; 8.6 TABLET ORAL at 21:43

## 2024-08-13 RX ADMIN — Medication 15 ML: at 08:40

## 2024-08-13 RX ADMIN — OLANZAPINE 5 MG: 5 TABLET, ORALLY DISINTEGRATING ORAL at 19:16

## 2024-08-13 RX ADMIN — LORAZEPAM 0.5 MG: 0.5 TABLET ORAL at 14:03

## 2024-08-13 RX ADMIN — CARVEDILOL 6.25 MG: 6.25 TABLET, FILM COATED ORAL at 16:40

## 2024-08-13 RX ADMIN — MELATONIN TAB 3 MG 3 MG: 3 TAB at 21:44

## 2024-08-13 RX ADMIN — FLUOXETINE 40 MG: 20 SOLUTION ORAL at 08:43

## 2024-08-13 RX ADMIN — LORAZEPAM 1 MG: 1 TABLET ORAL at 21:43

## 2024-08-13 RX ADMIN — CLOZAPINE 250 MG: 25 TABLET ORAL at 11:25

## 2024-08-13 RX ADMIN — CYANOCOBALAMIN TAB 1000 MCG 1000 MCG: 1000 TAB at 08:40

## 2024-08-13 RX ADMIN — LORAZEPAM 1 MG: 1 TABLET ORAL at 08:40

## 2024-08-13 RX ADMIN — POLYETHYLENE GLYCOL 3350 17 G: 17 POWDER, FOR SOLUTION ORAL at 08:41

## 2024-08-13 RX ADMIN — NICOTINE 1 PATCH: 7 PATCH, EXTENDED RELEASE TRANSDERMAL at 08:41

## 2024-08-13 RX ADMIN — FAMOTIDINE 20 MG: 20 TABLET ORAL at 08:40

## 2024-08-13 NOTE — NURSING NOTE
Patient needed encouragement to come out of room for dinner. Patient tried to bargain with writer to let her eat in room. Patient eventually came out to eat and had 100%. Patient took dinner med without issue. No behaviors observed. Patient with 1:1 while awake in close proximity. Denies SI/HI/AH/VH but guarded. VSS. Continual safety checks ongoing with 1:1 while awake

## 2024-08-13 NOTE — PROGRESS NOTES
"Progress Note - Behavioral Health     Meli Nowak 59 y.o. female MRN: [id number]   Unit/Bed#: [facility] 651-01 Encounter: [id number]    Behavior over the last 24 hours: unchanged.     Meli was seen today in follow-up for continuation of care. Per staff, no acute events reported overnight. Meli is somewhat more interactive and engaged with light conversation. However when discussing hallucinations and safety she terminated interview and refused to cooperate requesting a new doctor. Last evening staff note she remained paranoid asking if medications were going to kill her. At this time she spit her medications into water and later accepted. She did not respond when assessing for SI/HI/AVH.  At time of interview, still paranoid and preoccupied with select staff. Meli has been complaint with medications and tolerating without any side effects.       Sleep: normal  Appetite: normal, fair  Medication side effects: No   ROS: no complaints, all other systems are negative    Mental Status Evaluation:    Appearance:  disheveled, wearing hospital clothes, eyes closed, poor eye contact, recently showered   Behavior:  Initially cooperative, more engaged then suspicious and refusal to speak   Speech:  normal rate and volume   Mood:  euthymic, \"good\"   Affect:  labile   Thought Process:  disorganized, illogical   Associations: concrete associations   Thought Content:  paranoid ideation   Perceptual Disturbances: no auditory hallucinations, no visual hallucinations, appears preoccupied   Risk Potential: Suicidal ideation - None at present  Homicidal ideation - None at present  Potential for aggression - No   Sensorium:  oriented to person, place, and time/date   Memory:  recent and remote memory grossly intact   Consciousness:  alert and awake   Attention/Concentration: decreased concentration and decreased attention span   Insight:  poor   Judgment: poor   Gait/Station: in bed   Motor Activity: no abnormal movements     Vital signs " in last 24 hours:    Temp:  [97.9 °F (36.6 °C)-98.3 °F (36.8 °C)] 97.9 °F (36.6 °C)  HR:  [82-96] 84  Resp:  [17-18] 18  BP: (124-147)/(66-78) 141/78    Laboratory results: I have personally reviewed all pertinent laboratory/tests results    Results from the past 24 hours:   Recent Results (from the past 24 hour(s))   CBC and differential    Collection Time: 08/12/24  2:35 PM   Result Value Ref Range    WBC 7.12 4.31 - 10.16 Thousand/uL    RBC 4.75 3.81 - 5.12 Million/uL    Hemoglobin 14.2 11.5 - 15.4 g/dL    Hematocrit 44.0 34.8 - 46.1 %    MCV 93 82 - 98 fL    MCH 29.9 26.8 - 34.3 pg    MCHC 32.3 31.4 - 37.4 g/dL    RDW 15.6 (H) 11.6 - 15.1 %    MPV 8.8 (L) 8.9 - 12.7 fL    Platelets 278 149 - 390 Thousands/uL    nRBC 0 /100 WBCs    Segmented % 60 43 - 75 %    Immature Grans % 0 0 - 2 %    Lymphocytes % 29 14 - 44 %    Monocytes % 10 4 - 12 %    Eosinophils Relative 0 0 - 6 %    Basophils Relative 1 0 - 1 %    Absolute Neutrophils 4.26 1.85 - 7.62 Thousands/µL    Absolute Immature Grans 0.01 0.00 - 0.20 Thousand/uL    Absolute Lymphocytes 2.07 0.60 - 4.47 Thousands/µL    Absolute Monocytes 0.70 0.17 - 1.22 Thousand/µL    Eosinophils Absolute 0.00 0.00 - 0.61 Thousand/µL    Basophils Absolute 0.08 0.00 - 0.10 Thousands/µL   C-reactive protein    Collection Time: 08/12/24  2:35 PM   Result Value Ref Range    CRP 2.8 <3.0 mg/L   CK    Collection Time: 08/12/24  2:35 PM   Result Value Ref Range    Total  26 - 192 U/L   Fingerstick Glucose (POCT)    Collection Time: 08/12/24  4:33 PM   Result Value Ref Range    POC Glucose 117 65 - 140 mg/dl   Fingerstick Glucose (POCT)    Collection Time: 08/13/24  7:21 AM   Result Value Ref Range    POC Glucose 110 65 - 140 mg/dl   Fingerstick Glucose (POCT)    Collection Time: 08/13/24 10:57 AM   Result Value Ref Range    POC Glucose 120 65 - 140 mg/dl       Progress Toward Goals: poor insight    Assessment & Plan   Principal Problem:    Schizoaffective disorder, bipolar type  (HCC)  Active Problems:    Medical clearance for psychiatric admission    Type 2 diabetes mellitus (HCC)    Obesity    Tobacco abuse    Hyperlipidemia    Esophageal reflux    Essential (primary) hypertension    Vitamin B12 deficiency    Hypoxia    Ambulatory dysfunction      Recommended Treatment:     Planned medication and treatment changes:    All current active medications have been reviewed  Encourage group therapy, milieu therapy and occupational therapy  Behavioral Health checks every 7 minutes    Decrease Zyprexa to 5 mg nightly. Increase Clozaril to 300 mg daily. Labs reviewed including CBC, ANC, CK, CRP. Has been having regular bowel movements (3 yesterday).  Will continue 1:1 while awake for safety concerns and impulsivity.    Discharge planning ongoing - recently determined to be nursing home clinically eligible. Most likely require discharge for post-acute rehab when psychiatrically stable.     Current Facility-Administered Medications   Medication Dose Route Frequency Provider Last Rate    acetaminophen  650 mg Oral Q4H PRN JOSE ELIAS Figueroa      acetaminophen  650 mg Oral Q4H PRN JOSE ELIAS Figueroa      acetaminophen  975 mg Oral Q6H PRN JOSE ELIAS Figueroa      atorvastatin  10 mg Oral Daily JOSE ELIAS Figueroa      bisacodyl  10 mg Rectal Daily PRN JOSE ELIAS Figueroa      carvedilol  6.25 mg Oral BID With Meals JOSE ELIAS Figueroa      cloNIDine  0.1 mg Transdermal Weekly JOSE ELIAS Figueroa      [START ON 8/14/2024] cloZAPine  300 mg Oral Daily Rehana Borrego PA-C      cyanocobalamin  1,000 mcg Oral Daily JOSE ELIAS Figueroa      famotidine  20 mg Oral BID Shan Fitzgerald DO      FLUoxetine  40 mg Oral Daily Dereje Banuelos MD      fluticasone  1 puff Inhalation Daily JOSE ELIAS Figueroa      hydrOXYzine HCL  25 mg Oral Q6H PRN Max 4/day JOSE ELIAS Figueroa      hydrOXYzine HCL  50 mg Oral Q6H PRN Max 4/day JOSE ELIAS Figueroa      ipratropium-albuterol  3 mL  Nebulization Q4H PRN Darin Lawton MD      LORazepam  0.5 mg Sublingual After Lunch Dereje Banuelos MD      Or    LORazepam  0.5 mg Intramuscular After Lunch Dereje Banuelos MD      LORazepam  1 mg Intramuscular Q6H PRN Max 3/day JOSE ELIAS Figueroa      LORazepam  1 mg Oral BID Dereje Banuelos MD      Or    LORazepam  1 mg Intramuscular BID Dereje Banuelos MD      LORazepam  1 mg Oral Q6H PRN Max 3/day JOSE ELIAS Figueroa      melatonin  3 mg Oral HS JOSE ELIAS Figueroa      Multivitamin  15 mL Oral Daily JOSE ELIAS Figueroa      nicotine  1 patch Transdermal Daily JOSE ELIAS Figueroa      OLANZapine  5 mg Oral HS Rehana Borrego PA-C      Or    OLANZapine  5 mg Intramuscular HS Rehana Borrego PA-C      OLANZapine  2.5 mg Oral Q6H PRN Dereje Banuelos MD      Or    OLANZapine  2.5 mg Intramuscular Q6H PRN Dereje Banuelos MD      OLANZapine  5 mg Oral Q6H PRN Dereje Banuleos MD      Or    OLANZapine  5 mg Intramuscular Q6H PRN Dereje Banuelos MD      ondansetron  4 mg Oral Q6H PRN Darin Lawton MD      polyethylene glycol  17 g Oral Daily PRN JOSE ELIAS Figueroa      polyethylene glycol  17 g Oral Daily JOSE ELIAS Ortega      senna-docusate sodium  1 tablet Oral HS JOSE ELIAS Figueroa      traZODone  50 mg Oral HS PRN JOSE ELIAS Figueroa       Risks / Benefits of Treatment:    Risks, benefits, and possible side effects of medications explained to patient and patient verbalizes understanding and agreement for treatment.    Counseling / Coordination of Care:    Total floor / unit time spent today 20 minutes. Greater than 50% of total time was spent with the patient and / or family counseling and / or coordination of care. A description of counseling / coordination of care:  Patient's progress discussed with staff in treatment team meeting.  Medications, treatment progress and treatment plan reviewed with patient.    Rehana Borrego PA-C 08/13/24

## 2024-08-13 NOTE — PHYSICAL THERAPY NOTE
Physical TherapyTreatment Note    Patient's Name: Meli Nowak    Admitting Diagnosis  Major depressive disorder, single episode, unspecified [F32.9]  Schizoaffective disorder (HCC) [F25.9]    Problem List  Patient Active Problem List   Diagnosis    Medical clearance for psychiatric admission    Type 2 diabetes mellitus (HCC)    Obesity    Psychosis (HCC)    Tobacco abuse    Hyperlipidemia    Schizoaffective disorder, bipolar type (HCC)    Altered mental status    Agoraphobia with panic disorder    Arthritis    Diastasis recti    Endometrial cancer (HCC)    Esophageal reflux    Hepatic steatosis    HSV-2 infection    Incisional hernia, without obstruction or gangrene    Incontinence of urine in female    Polycystic ovaries    Postmenopausal bleeding    Posttraumatic stress disorder    Right buttock pain    Catatonia    Essential (primary) hypertension    Vitamin B12 deficiency    Hypoxia    Ambulatory dysfunction       Past Medical History  Past Medical History:   Diagnosis Date    Cognitive impairment     Diabetes mellitus (HCC)     Essential (primary) hypertension     Psychosis (HCC)        Past Surgical History  Past Surgical History:   Procedure Laterality Date     SECTION      CHOLECYSTECTOMY         Recent Imaging  XR chest portable   Final Result by Mauri Montalvo MD ( [id number])      Hypoventilation without definite infiltrate.      Follow-up study would be useful if symptoms should persist.            Workstation performed: [id number]         XR chest portable   Final Result by Quincy Farooq MD ( [id number])      No focal consolidation, pleural effusion, or pneumothorax.      Resident: Jose Luis Couch I, the attending radiologist, have reviewed the images and agree with the final report above.      Workstation performed: [id number]             Recent Vital Signs  Vitals:    24 1528 24 1718 24 2050 24 0402   BP: 129/66 124/76 147/66    BP Location: Left arm  Right  arm    Pulse: 95 96 82    Resp: 17  18    Temp: 98.3 °F (36.8 °C)  98.2 °F (36.8 °C)    TempSrc: Temporal  Temporal    SpO2: 96%  96%    Weight:    91.6 kg (201 lb 15.1 oz)   Height:            08/12/24 1400   PT Last Visit   PT Visit Date 08/12/24   Note Type   Note Type Treatment   Pain Assessment   Pain Assessment Tool 0-10   Pain Score No Pain   Restrictions/Precautions   Weight Bearing Precautions Per Order No   Other Precautions 1:1   General   Chart Reviewed Yes   Response to Previous Treatment Patient with no complaints from previous session.   Family/Caregiver Present No   Cognition   Overall Cognitive Status Impaired   Arousal/Participation Alert   Attention Attends with cues to redirect   Orientation Level Oriented to person;Oriented to place   Memory Decreased recall of recent events;Decreased short term memory   Following Commands Follows one step commands inconsistently   Bed Mobility   Supine to Sit 5  Supervision   Additional items Increased time required   Sit to Supine 5  Supervision   Additional items Increased time required   Transfers   Sit to Stand 5  Supervision   Additional items Increased time required   Stand to Sit 5  Supervision   Additional items Increased time required   Ambulation/Elevation   Gait pattern Step through pattern;Decreased toe off;Decreased heel strike;Decreased foot clearance;Wide MARCIAL   Gait Assistance 5  Supervision   Additional items Verbal cues   Assistive Device None   Distance 50ft, 100ft   Balance   Static Sitting Fair +   Dynamic Sitting Fair +   Static Standing Fair   Dynamic Standing Fair -   Ambulatory Fair -   Endurance Deficit   Endurance Deficit Yes   Endurance Deficit Description reduced from baseline   Activity Tolerance   Activity Tolerance Patient tolerated treatment well   Medical Staff Made Aware spoke to CM   Nurse Made Aware spoke to RN   Assessment   Prognosis Good   Problem List Decreased strength;Decreased range of motion;Decreased  endurance;Impaired balance;Decreased mobility;Decreased coordination;Decreased cognition;Impaired judgement;Decreased safety awareness;Impaired sensation;Obesity   Assessment Pt is making significant improvement in mobility status able to ambulate with supervision. Continues to be on 1:1 for safety due to decreased safety awareness and insight. No using and AD at this time. Will likely progress to D/C home with home or OP PT vs rehab   Barriers to Discharge Inaccessible home environment;Decreased caregiver support   Goals   Patient Goals to keep getting better   STG Expiration Date 08/18/24   Short Term Goal #1 see eval note   PT Treatment Day 2   Plan   Treatment/Interventions Functional transfer training;ADL retraining;LE strengthening/ROM;Elevations;Endurance training;Therapeutic exercise;Patient/family training;Cognitive reorientation;Equipment eval/education;Bed mobility;Gait training;Spoke to nursing;Spoke to case management;OT   Progress Progressing toward goals   PT Frequency 1-2x/wk   Discharge Recommendation   Rehab Resource Intensity Level, PT II (Moderate Resource Intensity)   Equipment Recommended Walker   Walker Package Recommended Wheeled walker   AM-PAC Basic Mobility Inpatient   Turning in Flat Bed Without Bedrails 4   Lying on Back to Sitting on Edge of Flat Bed Without Bedrails 4   Moving Bed to Chair 3   Standing Up From Chair Using Arms 3   Walk in Room 3   Climb 3-5 Stairs With Railing 2   Basic Mobility Inpatient Raw Score 19   Basic Mobility Standardized Score 42.48   [facility] Highest Level Of Mobility   JH-HLM Goal 6: Walk 10 steps or more   JH-HLM Achieved 7: Walk 25 feet or more   Education   Education Provided Mobility training;Home exercise program;Assistive device   Patient Reinforcement needed   End of Consult   Patient Position at End of Consult Seated edge of bed;All needs within reach       Murray Crawford PT, DPT

## 2024-08-13 NOTE — PLAN OF CARE
Problem: Alteration in Thoughts and Perception  Goal: Treatment Goal: Gain control of psychotic behaviors/thinking, reduce/eliminate presenting symptoms and demonstrate improved reality functioning upon discharge  Outcome: Progressing  Goal: Verbalize thoughts and feelings  Description: Interventions:  - Promote a nonjudgmental and trusting relationship with the patient through active listening and therapeutic communication  - Assess patient's level of functioning, behavior and potential for risk  - Engage patient in 1 on 1 interactions  - Encourage patient to express fears, feelings, frustrations, and discuss symptoms    - [place] patient to reality, help patient recognize reality-based thinking   - Administer medications as ordered and assess for potential side effects  - Provide the patient education related to the signs and symptoms of the illness and desired effects of prescribed medications  Outcome: Progressing  Goal: Refrain from acting on delusional thinking/internal stimuli  Description: Interventions:  - Monitor patient closely, per order   - Utilize least restrictive measures   - Set reasonable limits, give positive feedback for acceptable   - Administer medications as ordered and monitor of potential side effects  Outcome: Progressing  Goal: Agree to be compliant with medication regime, as prescribed and report medication side effects  Description: Interventions:  - Offer appropriate PRN medication and supervise ingestion; conduct AIMS, as needed   Outcome: Progressing  Goal: Attend and participate in unit activities, including therapeutic, recreational, and educational groups  Description: Interventions:  -Encourage Visitation and family involvement in care  Outcome: Progressing  Goal: Recognize dysfunctional thoughts, communicate reality-based thoughts at the time of discharge  Description: Interventions:  - Provide medication and psycho-education to assist patient in compliance and developing  insight into his/her illness   Outcome: Progressing  Goal: Complete daily ADLs, including personal hygiene independently, as able  Description: Interventions:  - Observe, teach, and assist patient with ADLS  - Monitor and promote a balance of rest/activity, with adequate nutrition and elimination   Outcome: Progressing     Problem: Risk for Self Injury/Neglect  Goal: Treatment Goal: Remain safe during length of stay, learn and adopt new coping skills, and be free of self-injurious ideation, impulses and acts at the time of discharge  Outcome: Progressing  Goal: Verbalize thoughts and feelings  Description: Interventions:  - Assess and re-assess patient's lethality and potential for self-injury  - Engage patient in 1:1 interactions, daily, for a minimum of 15 minutes  - Encourage patient to express feelings, fears, frustrations, hopes  - Establish rapport/trust with patient   Outcome: Progressing  Goal: Refrain from harming self  Description: Interventions:  - Monitor patient closely, per order  - Develop a trusting relationship  - Supervise medication ingestion, monitor effects and side effects   Outcome: Progressing  Goal: Attend and participate in unit activities, including therapeutic, recreational, and educational groups  Description: Interventions:  - Provide therapeutic and educational activities daily, encourage attendance and participation, and document same in the medical record  - Obtain collateral information, encourage visitation and family involvement in care   Outcome: Progressing  Goal: Recognize maladaptive responses and adopt new coping mechanisms  Outcome: Progressing     Problem: Potential for Falls  Goal: Patient will remain free of falls  Description: INTERVENTIONS:  - Educate patient on patient safety including physical limitations  - Instruct patient to call for assistance with activity   - Consult OT/PT to assist with strengthening/mobility   - Keep Call bell within reach  - Keep bed low and  locked with side rails adjusted as appropriate  - Keep care items and personal belongings within reach  - Initiate and maintain comfort rounds  - Offer Toileting every 2 Hours, in advance of need  - Initiate/Maintain bed and chair alarm  - Obtain necessary fall risk management equipment: walker, wheelchair  - Apply yellow socks and bracelet for high fall risk patients  - Patient moved to room near nurses station  Outcome: Progressing

## 2024-08-13 NOTE — PROGRESS NOTES
08/13/24 0743   Team Meeting   Meeting Type Daily Rounds   Initial Conference Date 08/13/24   Next Conference Date 08/14/24   Team Members Present   Team Members Present Physician;Nurse;;   Physician Team Member СЕРГЕЙ Romero   Nursing Team Member Clarissa   Care Management Team Member Anum   Social Work Team Member Ayse   Patient/Family Present   Patient Present No   Patient's Family Present No     Clozaril level 8/5 164, Clozaril level due, took all meds, discharge pending

## 2024-08-13 NOTE — NURSING NOTE
Patient is pleasant and cooperative , with poor concentration. Her behaviors and attitude is suspicious, paranoid and wants to be reassured with taking her medications if they are safe, . Patient has bizarre appearance with worried affect. Good medication compliance. Impulsive behaviors not  observed. Patient denies A,V/H but appears internally preoccupied and scared.   Denies Si,HI.

## 2024-08-13 NOTE — SPEECH THERAPY NOTE
Speech Language/Pathology    Speech/Language Pathology Progress Note    Patient Name: Meli Nowak  Today's Date: 2024                     SLP RECOMMENDATIONS:         Diet: Level 3 Dental soft         Liquids: Thin liquids         Medications: as best tolerated         Aspiration Precautions: upright        Summary:  Pt continues with somewhat limited participation and verbal output during session. Pt observed with small bite of soft solids with slow, but functional mastication. No overt s/s aspiration observed with thin liquids via cup sips. Discussed current diet with pt. Pt reported she does not mind Dental soft diet and declined advanced textures trials. Will continue current diet at this time. Pt has met all ST goals. Will d/c from services. Please re-consult with any new concerns.     Assessment:  No overt s/s aspiration with thin     Plan/Recommendations  Level 3 Dental soft/thin liquids   Aspiration precautions  D/c from ST services         Lab Results   Component Value Date    WBC 7.12 2024    HGB 14.2 2024    HCT 44.0 2024    MCV 93 2024     2024           Problem List  Principal Problem:    Schizoaffective disorder, bipolar type (HCC)  Active Problems:    Medical clearance for psychiatric admission    Type 2 diabetes mellitus (HCC)    Obesity    Tobacco abuse    Hyperlipidemia    Esophageal reflux    Essential (primary) hypertension    Vitamin B12 deficiency    Hypoxia    Ambulatory dysfunction       Past Medical History  Past Medical History:   Diagnosis Date    Cognitive impairment     Diabetes mellitus (HCC)     Essential (primary) hypertension     Psychosis (HCC)         Past Surgical History  Past Surgical History:   Procedure Laterality Date     SECTION      CHOLECYSTECTOMY

## 2024-08-13 NOTE — PLAN OF CARE
Problem: PHYSICAL THERAPY ADULT  Goal: Performs mobility at highest level of function for planned discharge setting.  See evaluation for individualized goals.  Description: Treatment/Interventions: Functional transfer training, ADL retraining, LE strengthening/ROM, Elevations, Endurance training, Therapeutic exercise, Patient/family training, Cognitive reorientation, Equipment eval/education, Bed mobility, Gait training, Spoke to nursing, Spoke to case management, OT  Equipment Recommended: Walker       See flowsheet documentation for full assessment, interventions and recommendations.  Outcome: Progressing  Note: Prognosis: Good  Problem List: Decreased strength, Decreased range of motion, Decreased endurance, Impaired balance, Decreased mobility, Decreased coordination, Decreased cognition, Impaired judgement, Decreased safety awareness, Impaired sensation, Obesity  Assessment: Pt is making significant improvement in mobility status able to ambulate with supervision. Continues to be on 1:1 for safety due to decreased safety awareness and insight. No using and AD at this time. Will likely progress to D/C home with home or OP PT vs rehab  Barriers to Discharge: Inaccessible home environment, Decreased caregiver support     Rehab Resource Intensity Level, PT: II (Moderate Resource Intensity)    See flowsheet documentation for full assessment.

## 2024-08-14 LAB
GLUCOSE SERPL-MCNC: 107 MG/DL (ref 65–140)
GLUCOSE SERPL-MCNC: 90 MG/DL (ref 65–140)

## 2024-08-14 PROCEDURE — 99232 SBSQ HOSP IP/OBS MODERATE 35: CPT | Performed by: PSYCHIATRY & NEUROLOGY

## 2024-08-14 PROCEDURE — 82948 REAGENT STRIP/BLOOD GLUCOSE: CPT

## 2024-08-14 RX ADMIN — LORAZEPAM 1 MG: 1 TABLET ORAL at 09:02

## 2024-08-14 RX ADMIN — SENNOSIDES AND DOCUSATE SODIUM 1 TABLET: 50; 8.6 TABLET ORAL at 21:17

## 2024-08-14 RX ADMIN — LORAZEPAM 0.5 MG: 0.5 TABLET ORAL at 15:27

## 2024-08-14 RX ADMIN — CLOZAPINE 300 MG: 100 TABLET ORAL at 09:02

## 2024-08-14 RX ADMIN — Medication 15 ML: at 09:10

## 2024-08-14 RX ADMIN — FLUOXETINE 40 MG: 20 SOLUTION ORAL at 09:12

## 2024-08-14 RX ADMIN — POLYETHYLENE GLYCOL 3350 17 G: 17 POWDER, FOR SOLUTION ORAL at 09:10

## 2024-08-14 RX ADMIN — FAMOTIDINE 20 MG: 20 TABLET ORAL at 09:02

## 2024-08-14 RX ADMIN — MELATONIN TAB 3 MG 3 MG: 3 TAB at 21:17

## 2024-08-14 RX ADMIN — LORAZEPAM 1 MG: 1 TABLET ORAL at 21:17

## 2024-08-14 RX ADMIN — ATORVASTATIN CALCIUM 10 MG: 10 TABLET, FILM COATED ORAL at 09:03

## 2024-08-14 RX ADMIN — FLUTICASONE FUROATE 1 PUFF: 100 POWDER RESPIRATORY (INHALATION) at 09:13

## 2024-08-14 RX ADMIN — CYANOCOBALAMIN TAB 1000 MCG 1000 MCG: 1000 TAB at 09:03

## 2024-08-14 NOTE — TREATMENT TEAM
08/14/24 0803   Team Meeting   Meeting Type Daily Rounds   Initial Conference Date 08/14/24   Team Members Present   Team Members Present Physician;Nurse;;   Physician Team Member Rehana Sweet PA-C   Nursing Team Member Clarissa   Care Management Team Member Mercedes   Social Work Team Member Ayse   Patient/Family Present   Patient Present No   Patient's Family Present No     The patient continues with paranoid delusions, remains on 1:1 while awake for safety, patient can be impulsive. Religiously preoccupied. Paranoid. Clozapine level 312 yesterday, Clozaril to be increased. Discharge pending post acute rehab vs home once psychiatrically stable.

## 2024-08-14 NOTE — NURSING NOTE
Patient is pleasant and cooperative , with poor concentration. Her  behaviors and attitude is suspicious, paranoid and warm and friendly. Patient has bizarre appearance with worried affect. Good medication compliance. Impulsive behaviors not  observed. Patient stated that she feels like she is floating in the bed and asked nurse if medication she was giving her would help her. Patient asked many times for reassurances. . Denies Si,HI. Appears  to respond to internal stimuli

## 2024-08-14 NOTE — NURSING NOTE
[place] is isolative with many somatic c/o discomfort. Patient appears paranoid and needs reassurance. Meli showered. Meli remains a 1:1 while awake. Meli is medication compliant. [place] appears to be responding to internal stimuli much less. Will continue to monitor and support during treatment.

## 2024-08-14 NOTE — PLAN OF CARE
Problem: DISCHARGE PLANNING - CARE MANAGEMENT  Goal: Discharge to post-acute care or home with appropriate resources  Description: INTERVENTIONS:  - Conduct assessment to determine patient/family and health care team treatment goals, and need for post-acute services based on payer coverage, community resources, and patient preferences, and barriers to discharge  - Address psychosocial, clinical, and financial barriers to discharge as identified in assessment in conjunction with the patient/family and health care team  - Arrange appropriate level of post-acute services according to patient’s   needs and preference and payer coverage in collaboration with the physician and health care team  - Communicate with and update the patient/family, physician, and health care team regarding progress on the discharge plan  - Arrange appropriate transportation to post-acute venues  Outcome: Progressing     Problem: Prexisting or High Potential for Compromised Skin Integrity  Goal: Skin integrity is maintained or improved  Description: INTERVENTIONS:  - Identify patients at risk for skin breakdown  - Assess and monitor skin integrity  - Assess and monitor nutrition and hydration status  - Monitor labs   - Assess for incontinence   - Turn and reposition patient  - Assist with mobility/ambulation  - Relieve pressure over bony prominences  - Avoid friction and shearing  - Provide appropriate hygiene as needed including keeping skin clean and dry  - Evaluate need for skin moisturizer/barrier cream  - Collaborate with interdisciplinary team   - Patient/family teaching  - Consider wound care consult   Outcome: Progressing     Problem: Alteration in Thoughts and Perception  Goal: Verbalize thoughts and feelings  Description: Interventions:  - Promote a nonjudgmental and trusting relationship with the patient through active listening and therapeutic communication  - Assess patient's level of functioning, behavior and potential for  risk  - Engage patient in 1 on 1 interactions  - Encourage patient to express fears, feelings, frustrations, and discuss symptoms    - [place] patient to reality, help patient recognize reality-based thinking   - Administer medications as ordered and assess for potential side effects  - Provide the patient education related to the signs and symptoms of the illness and desired effects of prescribed medications  Outcome: Progressing     Problem: Ineffective Coping  Goal: Participates in unit activities  Description: Interventions:  - Provide therapeutic environment   - Provide required programming   - Redirect inappropriate behaviors   Outcome: Not Progressing  Goal: Free from restraint events  Description: - Utilize least restrictive measures   - Provide behavioral interventions   - Redirect inappropriate behaviors   Outcome: Progressing     Problem: Risk for Self Injury/Neglect  Goal: Refrain from harming self  Description: Interventions:  - Monitor patient closely, per order  - Develop a trusting relationship  - Supervise medication ingestion, monitor effects and side effects   Outcome: Progressing     Problem: Depression  Goal: Refrain from isolation  Description: Interventions:  - Develop a trusting relationship   - Encourage socialization   Outcome: Not Progressing     Problem: Potential for Falls  Goal: Patient will remain free of falls  Description: INTERVENTIONS:  - Educate patient on patient safety including physical limitations  - Instruct patient to call for assistance with activity   - Consult OT/PT to assist with strengthening/mobility   - Keep Call bell within reach  - Keep bed low and locked with side rails adjusted as appropriate  - Keep care items and personal belongings within reach  - Initiate and maintain comfort rounds  - Offer Toileting every 2 Hours, in advance of need  - Initiate/Maintain bed and chair alarm  - Obtain necessary fall risk management equipment: walker, wheelchair  - Apply yellow  socks and bracelet for high fall risk patients  - Patient moved to room near nurses station  Outcome: Progressing     Problem: Nutrition/Hydration-ADULT  Goal: Nutrient/Hydration intake appropriate for improving, restoring or maintaining nutritional needs  Description: Monitor and assess patient's nutrition/hydration status for malnutrition. Collaborate with interdisciplinary team and initiate plan and interventions as ordered.  Monitor patient's weight and dietary intake as ordered or per policy. Utilize nutrition screening tool and intervene as necessary. Determine patient's food preferences and provide high-protein, high-caloric foods as appropriate.     INTERVENTIONS:  - Monitor oral intake, urinary output, labs, and treatment plans  - Assess nutrition and hydration status and recommend course of action  - Evaluate amount of meals eaten  - Assist patient with eating if necessary   - Allow adequate time for meals  - Recommend/ encourage appropriate diets, oral nutritional supplements, and vitamin/mineral supplements  - Order, calculate, and assess calorie counts as needed  - Recommend, monitor, and adjust tube feedings and TPN/PPN based on assessed needs  - Assess need for intravenous fluids  - Provide specific nutrition/hydration education as appropriate  - Include patient/family/caregiver in decisions related to nutrition  Outcome: Progressing

## 2024-08-14 NOTE — PROGRESS NOTES
Progress Note - Behavioral Health     Meli Nowak 59 y.o. female MRN: [id number]   Unit/Bed#: [facility] 651-01 Encounter: [id number]    Behavior over the last 24 hours: unchanged.     Meli was seen today in follow-up for continuation of care. Per staff, she continue to remain bizarre, paranoid and religiously preoccupied. Meli is seen in bed with 1:1 observer. She is sleeping and despite making attempts to wake her, seems that he is awake and refusing to cooperate with interview today.  Meli adamantly denies suicidal/homicidal ideation in addition to thoughts of self-injury. Meli presently is contacting for safety on the unit and feels comfortable to confide in staff if thoughts arise. At time of interview, no overt psychosis elicited. Meli has been complaint with medications and tolerating without any side effects.     Sleep: normal  Appetite: normal, fair  Medication side effects: No   ROS: no complaints, all other systems are negative    Mental Status Evaluation:    Appearance:  disheveled, wearing hospital clothes, laying in bed   Behavior:  Refusing to cooperate, poor eye contact   Speech:  unable to assess   Mood:  She did not speak   Affect:  labile   Thought Process:  unable to assess   Associations: unable to assess - non-verbal   Thought Content:  paranoid ideation   Perceptual Disturbances: appears preoccupied   Risk Potential: Suicidal ideation - unable to assess  Homicidal ideation - unable to assess  Potential for aggression - No   Sensorium:  unable to assess   Memory:  recent and remote memory: unable to assess due to lack of cooperation   Consciousness:  alert and awake   Attention/Concentration: attention span and concentration: unable to assess due to lack of cooperation   Insight:  poor   Judgment: poor   Gait/Station: in bed   Motor Activity: no abnormal movements     Vital signs in last 24 hours:    Temp:  [97.6 °F (36.4 °C)-97.8 °F (36.6 °C)] 97.8 °F (36.6 °C)  HR:  [] 84  Resp:  [16-17]  16  BP: (101-133)/(58-76) 108/67    Laboratory results: I have personally reviewed all pertinent laboratory/tests results    Results from the past 24 hours:   Recent Results (from the past 24 hour(s))   Fingerstick Glucose (POCT)    Collection Time: 08/13/24  4:00 PM   Result Value Ref Range    POC Glucose 100 65 - 140 mg/dl   Fingerstick Glucose (POCT)    Collection Time: 08/14/24  7:23 AM   Result Value Ref Range    POC Glucose 107 65 - 140 mg/dl       Progress Toward Goals: poor insight    Assessment & Plan   Principal Problem:    Schizoaffective disorder, bipolar type (HCC)  Active Problems:    Medical clearance for psychiatric admission    Type 2 diabetes mellitus (HCC)    Obesity    Tobacco abuse    Hyperlipidemia    Esophageal reflux    Essential (primary) hypertension    Vitamin B12 deficiency    Hypoxia    Ambulatory dysfunction      Recommended Treatment:     Planned medication and treatment changes:    All current active medications have been reviewed  Encourage group therapy, milieu therapy and occupational therapy  Behavioral Health checks every 7 minutes    Will discontinue Zyprexa. Increase Clozaril to 350 mg daily. Labs reviewed including CBC, ANC, CK, CRP. Recent Clozaril level 312.  Will continue 1:1 while awake for safety concerns and impulsivity.    Discharge planning ongoing - recently determined to be nursing home clinically eligible. Most likely require discharge for post-acute rehab when psychiatrically stable vs discharge home.     Current Facility-Administered Medications   Medication Dose Route Frequency Provider Last Rate    acetaminophen  650 mg Oral Q4H PRN JOSE ELIAS Figueroa      acetaminophen  650 mg Oral Q4H PRN JOSE ELIAS Figueroa      acetaminophen  975 mg Oral Q6H PRN JOSE ELIAS Figueroa      atorvastatin  10 mg Oral Daily OJSE ELIAS Figueroa      bisacodyl  10 mg Rectal Daily PRN JOSE ELIAS Figueroa      carvedilol  6.25 mg Oral BID With Meals JOSE ELIAS Figueroa       cloNIDine  0.1 mg Transdermal Weekly JOSE ELIAS Figueroa      [START ON 8/15/2024] cloZAPine  350 mg Oral Daily Rehana Borrego PA-C      cyanocobalamin  1,000 mcg Oral Daily JOSE ELIAS Figueroa      famotidine  20 mg Oral BID Shan Fitzgerald DO      FLUoxetine  40 mg Oral Daily Dereje Banuelos MD      fluticasone  1 puff Inhalation Daily JOSE ELIAS Figueroa      hydrOXYzine HCL  25 mg Oral Q6H PRN Max 4/day JOSE ELIAS Figueroa      hydrOXYzine HCL  50 mg Oral Q6H PRN Max 4/day JOSE ELIAS Figueroa      ipratropium-albuterol  3 mL Nebulization Q4H PRN Darin Lawton MD      LORazepam  0.5 mg Sublingual After Lunch Dereje Banuelos MD      Or    LORazepam  0.5 mg Intramuscular After Lunch Dereje Banuelos MD      LORazepam  1 mg Intramuscular Q6H PRN Max 3/day JOSE ELIAS Figueroa      LORazepam  1 mg Oral BID Dereje Banuelos MD      Or    LORazepam  1 mg Intramuscular BID Dereje Banuelos MD      LORazepam  1 mg Oral Q6H PRN Max 3/day OJSE ELIAS Figueroa      melatonin  3 mg Oral HS JOSE ELIAS Figueroa      Multivitamin  15 mL Oral Daily JOSE ELIAS Figueroa      nicotine  1 patch Transdermal Daily JOSE ELIAS Figueroa      OLANZapine  2.5 mg Oral Q6H PRN Dereje Banuelos MD      Or    OLANZapine  2.5 mg Intramuscular Q6H PRN Dereje Banuelos MD      OLANZapine  5 mg Oral Q6H PRN Dereje Banuelos MD      Or    OLANZapine  5 mg Intramuscular Q6H PRN Dereje Banuelos MD      ondansetron  4 mg Oral Q6H PRN Darin Lawton MD      polyethylene glycol  17 g Oral Daily PRN JOSE ELIAS Figueora      polyethylene glycol  17 g Oral Daily JOSE ELIAS Ortega      senna-docusate sodium  1 tablet Oral HS JOSE ELIAS Figueroa      traZODone  50 mg Oral HS PRN JOSE ELIAS Figueroa       Risks / Benefits of Treatment:    Risks, benefits, and possible side effects of medications explained to patient and patient verbalizes understanding and agreement for treatment.    Counseling / Coordination of  Care:    Total floor / unit time spent today 20 minutes. Greater than 50% of total time was spent with the patient and / or family counseling and / or coordination of care. A description of counseling / coordination of care:  Patient's progress discussed with staff in treatment team meeting.  Medications, treatment progress and treatment plan reviewed with patient.    Rehana Borrego PA-C 08/14/24

## 2024-08-15 LAB
GLUCOSE SERPL-MCNC: 109 MG/DL (ref 65–140)
GLUCOSE SERPL-MCNC: 126 MG/DL (ref 65–140)

## 2024-08-15 PROCEDURE — 82948 REAGENT STRIP/BLOOD GLUCOSE: CPT

## 2024-08-15 PROCEDURE — 99232 SBSQ HOSP IP/OBS MODERATE 35: CPT | Performed by: PSYCHIATRY & NEUROLOGY

## 2024-08-15 RX ADMIN — FLUTICASONE FUROATE 1 PUFF: 100 POWDER RESPIRATORY (INHALATION) at 10:15

## 2024-08-15 RX ADMIN — CLOZAPINE 350 MG: 100 TABLET ORAL at 10:09

## 2024-08-15 RX ADMIN — CARVEDILOL 6.25 MG: 6.25 TABLET, FILM COATED ORAL at 10:10

## 2024-08-15 RX ADMIN — SENNOSIDES AND DOCUSATE SODIUM 1 TABLET: 50; 8.6 TABLET ORAL at 21:33

## 2024-08-15 RX ADMIN — MELATONIN TAB 3 MG 3 MG: 3 TAB at 21:33

## 2024-08-15 RX ADMIN — FAMOTIDINE 20 MG: 20 TABLET ORAL at 18:00

## 2024-08-15 RX ADMIN — LORAZEPAM 1 MG: 1 TABLET ORAL at 10:09

## 2024-08-15 RX ADMIN — LORAZEPAM 1 MG: 1 TABLET ORAL at 21:33

## 2024-08-15 RX ADMIN — CARVEDILOL 6.25 MG: 6.25 TABLET, FILM COATED ORAL at 16:17

## 2024-08-15 NOTE — NURSING NOTE
Patient continues to have paranoid delusions and reports that her food and medications are being poisoned.  She did take her clozaril and lorazepam with much encouragement.  Continues on 1:1 observation.

## 2024-08-15 NOTE — NURSING NOTE
"Patient is calm with circumstantial thought, speaking about \"needing rest\" after asking how she is tolerating medication. On continual observation for safety. Laying in bed with flat affect reporting a \"calm, happy\" mood. Took scheduled BP med without hesitation or overt paranoia. No unmet needs currently.   Later in the evening, pt experiencing visual hallucinations of \"seeing lights, I can't see you right now.\" Pt saying \"something feels wrong, I feel like I'm dead.\" Pt also mentioned \"being given the wrong pills, they must have given me acid.\" Redirection and reassurance provided. More calm after sitting in bed, now walking the unit saluting staff, appears internally preoccupied talking to self.   "

## 2024-08-15 NOTE — NURSING NOTE
Patient withdrawn to her room. She is isolated to herself and guarded. She did take evening medication. She does respond to internal stimuli, she denies anxiety, depression, and SI.

## 2024-08-15 NOTE — PROGRESS NOTES
"Progress Note - Behavioral Health     Meli Nowak 59 y.o. female MRN: [id number]   Unit/Bed#: [facility] 651-01 Encounter: [id number]    Behavior over the last 24 hours: unchanged.     Meli was seen today in follow-up for continuation of care. Per staff, no acute events reported overnight. Meli is seen resting in bed. She is more talkative today, is discussing her hallucinations. She tells writer, \"my family hates me.\" When questioned why, she states her dead father has been speaking to her telling her she will not go to [facility]. There is some concern as her \"father\" is commanding her to not eat now. Per [facility] meals have been 25/100/0 yesterday. This morning she did not accept breakfast. She was agreeable to drink some cranberry juice.  Meli adamantly denies suicidal/homicidal ideation in addition to thoughts of self-injury. Meli presently is contacting for safety on the unit and feels comfortable to confide in staff if thoughts arise. At time of interview, still responding, distracted, intermittently cooperative with ongoing Jew and paranoid delusional content. Meli has been complaint with medications and tolerating without any side effects.       Sleep: normal  Appetite: normal, fair  Medication side effects: No   ROS: no complaints, all other systems are negative    Mental Status Evaluation:    Appearance:  disheveled, wearing hospital clothes, in bed   Behavior:  Cooperative, calm, eyes closed   Speech:  normal rate, normal volume, normal pitch   Mood:  depressed   Affect:  constricted   Thought Process:  perseverative   Associations: concrete associations, perseveration   Thought Content:  persecutory and fixed delusions, paranoid ideation   Perceptual Disturbances: appears preoccupied   Risk Potential: Suicidal ideation - None at present  Homicidal ideation - None at present  Potential for aggression - No   Sensorium:  oriented to person, place, and time/date   Memory:  recent and remote memory grossly " intact   Consciousness:  alert and awake   Attention/Concentration: attention span and concentration are age appropriate   Insight:  poor   Judgment: poor   Gait/Station: in bed   Motor Activity: no abnormal movements     Vital signs in last 24 hours:    Temp:  [97.4 °F (36.3 °C)-99.2 °F (37.3 °C)] 97.4 °F (36.3 °C)  HR:  [78-85] 85  Resp:  [16-18] 16  BP: (118-147)/(65-74) 118/65    Laboratory results: I have personally reviewed all pertinent laboratory/tests results    Results from the past 24 hours:   Recent Results (from the past 24 hour(s))   Fingerstick Glucose (POCT)    Collection Time: 08/14/24  4:14 PM   Result Value Ref Range    POC Glucose 90 65 - 140 mg/dl   Fingerstick Glucose (POCT)    Collection Time: 08/15/24  7:27 AM   Result Value Ref Range    POC Glucose 109 65 - 140 mg/dl       Progress Toward Goals: poor insight    Assessment & Plan   Principal Problem:    Schizoaffective disorder, bipolar type (HCC)  Active Problems:    Medical clearance for psychiatric admission    Type 2 diabetes mellitus (HCC)    Obesity    Tobacco abuse    Hyperlipidemia    Esophageal reflux    Essential (primary) hypertension    Vitamin B12 deficiency    Hypoxia    Ambulatory dysfunction      Recommended Treatment:     Planned medication and treatment changes:    All current active medications have been reviewed  Encourage group therapy, milieu therapy and occupational therapy  Behavioral Health checks every 7 minutes    Clozaril recently increased to 350 mg QAM starting today. Labs including CRP, CBC/diff & CK this upcoming Monday. Clozaril level VSS. Last BM reported on 8/12/24. Bowel regimen includes mirlax & senna.     Will continue with slow taper of ativan. D/C afternoon dose of 0.5 mg daily after lunch.     Discharge planning ongoing - recently determined to be nursing home clinically eligible. Most likely require discharge for post-acute rehab when psychiatrically stable vs discharge home.     Current  Facility-Administered Medications   Medication Dose Route Frequency Provider Last Rate    acetaminophen  650 mg Oral Q4H PRN Marie Ziegler, CRNP      acetaminophen  650 mg Oral Q4H PRN Marie Ziegler, CRNP      acetaminophen  975 mg Oral Q6H PRN Marie Ziegler, JOSE ELIAS      atorvastatin  10 mg Oral Daily Marie Ziegler, CRNP      bisacodyl  10 mg Rectal Daily PRN Marie Ziegler, CRNP      carvedilol  6.25 mg Oral BID With Meals Marie Ziegler, CRVALE      cloNIDine  0.1 mg Transdermal Weekly Marie Ziegler, CRVALE      cloZAPine  350 mg Oral Daily Rehana Borrego PA-C      cyanocobalamin  1,000 mcg Oral Daily Marie Ziegler, CRVALE      famotidine  20 mg Oral BID Shan Fitzgerald DO      FLUoxetine  40 mg Oral Daily Dereje Banuelos MD      fluticasone  1 puff Inhalation Daily Marie Ziegler, JOSE ELIAS      hydrOXYzine HCL  25 mg Oral Q6H PRN Max 4/day Marie Ziegler, CRNP      hydrOXYzine HCL  50 mg Oral Q6H PRN Max 4/day Marie Ziegler, JOSE ELIAS      ipratropium-albuterol  3 mL Nebulization Q4H PRN Darin Lawton MD      LORazepam  1 mg Intramuscular Q6H PRN Max 3/day Marie Ziegler, CRNP      LORazepam  1 mg Oral BID Dereje Banuelos MD      Or    LORazepam  1 mg Intramuscular BID Dereje Banuelos MD      LORazepam  1 mg Oral Q6H PRN Max 3/day Marie Ziegler, JOSE ELIAS      melatonin  3 mg Oral HS Marie Ziegler, CRVALE      Multivitamin  15 mL Oral Daily Marie Ziegler, CRNP      nicotine  1 patch Transdermal Daily Marie Ziegler, JOSE ELIAS      OLANZapine  2.5 mg Oral Q6H PRN Dereje Banuelos MD      Or    OLANZapine  2.5 mg Intramuscular Q6H PRN Dereje Banuelos MD      OLANZapine  5 mg Oral Q6H PRN Dereje Banuelos MD      Or    OLANZapine  5 mg Intramuscular Q6H PRN Dereje Banuelos MD      ondansetron  4 mg Oral Q6H PRN Darin Lawton MD      polyethylene glycol  17 g Oral Daily PRN Marie Ziegler, CHRISTOPHERNP      polyethylene glycol  17 g Oral Daily Kristen Logan, JOSE ELIAS      senna-docusate sodium  1 tablet Oral HS  JOSE ELIAS Figueroa      traZODone  50 mg Oral HS PRN JOSE ELIAS Figueroa       Risks / Benefits of Treatment:    Risks, benefits, and possible side effects of medications explained to patient and patient verbalizes understanding and agreement for treatment.    Counseling / Coordination of Care:    Total floor / unit time spent today 20 minutes. Greater than 50% of total time was spent with the patient and / or family counseling and / or coordination of care. A description of counseling / coordination of care:  Patient's progress discussed with staff in treatment team meeting.  Medications, treatment progress and treatment plan reviewed with patient.    Rehana Borrego PA-C 08/15/24

## 2024-08-15 NOTE — PLAN OF CARE
Problem: Ineffective Coping  Goal: Understands least restrictive measures  Description: Interventions:  - Utilize least restrictive behavior  Outcome: Progressing     Problem: Risk for Self Injury/Neglect  Goal: Verbalize thoughts and feelings  Description: Interventions:  - Assess and re-assess patient's lethality and potential for self-injury  - Engage patient in 1:1 interactions, daily, for a minimum of 15 minutes  - Encourage patient to express feelings, fears, frustrations, hopes  - Establish rapport/trust with patient   Outcome: Progressing     Problem: Anxiety  Goal: Anxiety is at manageable level  Description: Interventions:  - Assess and monitor patient's anxiety level.   - Monitor for signs and symptoms (heart palpitations, chest pain, shortness of breath, headaches, nausea, feeling jumpy, restlessness, irritable, apprehensive).   - Collaborate with interdisciplinary team and initiate plan and interventions as ordered.  - Christine patient to unit/surroundings  - Explain treatment plan  - Encourage participation in care  - Encourage verbalization of concerns/fears  - Identify coping mechanisms  - Assist in developing anxiety-reducing skills  - Administer/offer alternative therapies  - Limit or eliminate stimulants  Outcome: Progressing     Problem: Alteration in Thoughts and Perception  Goal: Refrain from acting on delusional thinking/internal stimuli  Description: Interventions:  - Monitor patient closely, per order   - Utilize least restrictive measures   - Set reasonable limits, give positive feedback for acceptable   - Administer medications as ordered and monitor of potential side effects  8/15/2024 0201 by Graciela Rodriguez  Outcome: Not Progressing  8/15/2024 0200 by Graciela Rodriguez  Outcome: Not Progressing  Goal: Attend and participate in unit activities, including therapeutic, recreational, and educational groups  Description: Interventions:  -Encourage Visitation and family involvement in  care  Outcome: Not Progressing     Problem: Risk for Self Injury/Neglect  Goal: Attend and participate in unit activities, including therapeutic, recreational, and educational groups  Description: Interventions:  - Provide therapeutic and educational activities daily, encourage attendance and participation, and document same in the medical record  - Obtain collateral information, encourage visitation and family involvement in care   Outcome: Not Progressing     Problem: Depression  Goal: Refrain from isolation  Description: Interventions:  - Develop a trusting relationship   - Encourage socialization   Outcome: Not Progressing

## 2024-08-15 NOTE — TREATMENT TEAM
" 08/15/24 0738   Team Meeting   Meeting Type Daily Rounds   Initial Conference Date 08/15/24   Team Members Present   Team Members Present Physician;Nurse;;   Physician Team Member Rehana Sweet PA-C   Nursing Team Member Clarissa   Care Management Team Member Mercedes   Social Work Team Member Ayse   Patient/Family Present   Patient Present No   Patient's Family Present No   Pharmacy: [facility]    1:1 while awake. Clozaril increased. Patient continues with delusions and responding to internal stimuli. Reports hearing the \"devil.\" Discharge pending post acute rehab vs home once psychiatrically stable.   "

## 2024-08-16 LAB
GLUCOSE SERPL-MCNC: 104 MG/DL (ref 65–140)
GLUCOSE SERPL-MCNC: 109 MG/DL (ref 65–140)

## 2024-08-16 PROCEDURE — 99232 SBSQ HOSP IP/OBS MODERATE 35: CPT | Performed by: PSYCHIATRY & NEUROLOGY

## 2024-08-16 PROCEDURE — 82948 REAGENT STRIP/BLOOD GLUCOSE: CPT

## 2024-08-16 RX ORDER — AMOXICILLIN 250 MG
2 CAPSULE ORAL
Status: DISCONTINUED | OUTPATIENT
Start: 2024-08-16 | End: 2024-12-30 | Stop reason: HOSPADM

## 2024-08-16 RX ADMIN — LORAZEPAM 1 MG: 1 TABLET ORAL at 09:23

## 2024-08-16 RX ADMIN — LORAZEPAM 1 MG: 1 TABLET ORAL at 21:08

## 2024-08-16 RX ADMIN — HYDROXYZINE HYDROCHLORIDE 50 MG: 50 TABLET, FILM COATED ORAL at 03:00

## 2024-08-16 RX ADMIN — TRAZODONE HYDROCHLORIDE 50 MG: 50 TABLET ORAL at 00:21

## 2024-08-16 RX ADMIN — MELATONIN TAB 3 MG 3 MG: 3 TAB at 21:08

## 2024-08-16 RX ADMIN — CARVEDILOL 6.25 MG: 6.25 TABLET, FILM COATED ORAL at 09:23

## 2024-08-16 RX ADMIN — SENNOSIDES AND DOCUSATE SODIUM 2 TABLET: 8.6; 5 TABLET ORAL at 21:08

## 2024-08-16 RX ADMIN — CLOZAPINE 350 MG: 100 TABLET ORAL at 09:22

## 2024-08-16 RX ADMIN — FAMOTIDINE 20 MG: 20 TABLET ORAL at 17:15

## 2024-08-16 NOTE — PLAN OF CARE
[facility]    [address]    [facility] [place] [place] [address]    Phone:  [phone]                                       After Visit Summary   5/25/2017    Fely Clinton    MRN: [id number]           After Visit Summary Signature Page     I have received my discharge instructions, and my questions have been answered. I have discussed any challenges I see with this plan with the nurse or doctor.    ..........................................................................................................................................  Patient/Patient Representative Signature      ..........................................................................................................................................  Patient Representative Print Name and Relationship to Patient    ..................................................               ................................................  Date                                            Time    ..........................................................................................................................................  Reviewed by Signature/Title    ...................................................              ..............................................  Date                                                            Time           Pt did not attend any groups when prompted or offered.   Problem: Alteration in Thoughts and Perception  Goal: Attend and participate in unit activities, including therapeutic, recreational, and educational groups  Description: Interventions:  -Encourage Visitation and family involvement in care  Outcome: Not Progressing

## 2024-08-16 NOTE — TREATMENT TEAM
08/16/24 0723   Team Meeting   Meeting Type Daily Rounds   Initial Conference Date 08/16/24   Team Members Present   Team Members Present Physician;Nurse;;   Physician Team Member Rehana Sweet PA-C   Nursing Team Member Aleida   Care Management Team Member Mercedes   Social Work Team Member Ayse   Patient/Family Present   Patient Present No   Patient's Family Present No     Patient did not sleep, awake all night, paced the halls all night. She reported the food is poison. She has AH of her father and VH of lights. Patient reports she feels like she is dead. Patient did not eat yesterday. Medications being adjusted. Discharge pending post acute rehab vs home once psychiatrically stable.

## 2024-08-16 NOTE — PROGRESS NOTES
"Progress Note - Behavioral Health     Meli Nowak 59 y.o. female MRN: [id number]   Unit/Bed#: [facility] 651-01 Encounter: [id number]    Behavior over the last 24 hours: unchanged.     Meli was seen today in follow-up for continuation of care. Per staff, still remains paranoid, reporting hallucinations and was awake all night pacing the unit. Meli is seen laying in bed. She is less talkative than yesterday, and more guarded. She states that she does not want to speak with writer or answer questions regarding her hallucinations. Per I&O's still has been limited in regards to her intake (only accepting lunch and some cranberry juice). Reported yesterday that she is reducing intake due to CAH of her father telling her to do so. Meli adamantly denies suicidal/homicidal ideation in addition to thoughts of self-injury. Meli presently is contacting for safety on the unit and feels comfortable to confide in staff if thoughts arise. At time of interview, still paranoid, religiously preoccupied, and appears to be responding with delayed responses to questioning. Meli has been complaint with medications and tolerating without any side effects.     Sleep: decreased  Appetite: decreased  Medication side effects: Yes - some constipation  ROS: no complaints, all other systems are negative    Mental Status Evaluation:    Appearance:  disheveled, wearing hospital clothes, poor eye contact   Behavior:  Minimal cooperation, suspisious   Speech:  normal rate, normal volume, normal pitch   Mood:  depressed, \"I dont want to talk\"   Affect:  constricted   Thought Process:  perseverative   Associations: concrete associations, perseveration   Thought Content:  persecutory and fixed delusions, paranoid ideation   Perceptual Disturbances: appears preoccupied   Risk Potential: Suicidal ideation - None at present  Homicidal ideation - None at present  Potential for aggression - No   Sensorium:  oriented to person, place, and time/date   Memory:  " recent and remote memory grossly intact   Consciousness:  alert and awake   Attention/Concentration: attention span and concentration are age appropriate   Insight:  poor   Judgment: poor   Gait/Station: in bed   Motor Activity: no abnormal movements     Vital signs in last 24 hours:    Temp:  [96.5 °F (35.8 °C)-97.7 °F (36.5 °C)] 97.7 °F (36.5 °C)  HR:  [] 87  Resp:  [16-19] 19  BP: (138-154)/(79-88) 145/79    Laboratory results: I have personally reviewed all pertinent laboratory/tests results    Results from the past 24 hours:   Recent Results (from the past 24 hour(s))   Fingerstick Glucose (POCT)    Collection Time: 08/15/24  4:05 PM   Result Value Ref Range    POC Glucose 126 65 - 140 mg/dl   Fingerstick Glucose (POCT)    Collection Time: 08/16/24  7:30 AM   Result Value Ref Range    POC Glucose 104 65 - 140 mg/dl       Progress Toward Goals: poor insight    Assessment & Plan   Principal Problem:    Schizoaffective disorder, bipolar type (HCC)  Active Problems:    Medical clearance for psychiatric admission    Type 2 diabetes mellitus (HCC)    Obesity    Tobacco abuse    Hyperlipidemia    Esophageal reflux    Essential (primary) hypertension    Vitamin B12 deficiency    Hypoxia    Ambulatory dysfunction      Recommended Treatment:     Planned medication and treatment changes:    All current active medications have been reviewed  Encourage group therapy, milieu therapy and occupational therapy  Behavioral Health checks every 7 minutes    Clozaril recently increased to 350 mg QAM starting today. Labs including CRP, CBC/diff & CK this upcoming Monday. Clozaril level VSS. Last BM reported on 8/12/24. Bowel regimen includes mirlax & senna - will increase senna for tonight.     Discharge planning ongoing - recently determined to be nursing home clinically eligible. Most likely require discharge for post-acute rehab when psychiatrically stable vs discharge home.     Current Facility-Administered Medications    Medication Dose Route Frequency Provider Last Rate    acetaminophen  650 mg Oral Q4H PRN Marie Ziegler, CRNP      acetaminophen  650 mg Oral Q4H PRN Marie Ziegler, CRNP      acetaminophen  975 mg Oral Q6H PRN Marie Ziegler, CRNP      atorvastatin  10 mg Oral Daily Marie Ziegler, CRNP      bisacodyl  10 mg Rectal Daily PRN Marie Ziegler, CRNP      carvedilol  6.25 mg Oral BID With Meals Marie Ziegler, CRNP      cloNIDine  0.1 mg Transdermal Weekly Marie Ziegler, CRNP      cloZAPine  350 mg Oral Daily Rehana Borrego PA-C      cyanocobalamin  1,000 mcg Oral Daily Marie Ziegler, CRNP      famotidine  20 mg Oral BID Shan Fitzgerald, DO      FLUoxetine  40 mg Oral Daily Dereje Banuelos MD      fluticasone  1 puff Inhalation Daily Marie Ziegler, CRNP      hydrOXYzine HCL  25 mg Oral Q6H PRN Max 4/day Marie Ziegler, CRNP      hydrOXYzine HCL  50 mg Oral Q6H PRN Max 4/day Marie Ziegler, CRNP      ipratropium-albuterol  3 mL Nebulization Q4H PRN Darin Lawton MD      LORazepam  1 mg Intramuscular Q6H PRN Max 3/day Marie Ziegler, CRNP      LORazepam  1 mg Oral BID Dereje Banuelos MD      Or    LORazepam  1 mg Intramuscular BID Dereje Banuelos MD      LORazepam  1 mg Oral Q6H PRN Max 3/day Marie Ziegler, CRNP      melatonin  3 mg Oral HS Marie Ziegler, CRNP      Multivitamin  15 mL Oral Daily Marie Ziegler, CRNP      nicotine  1 patch Transdermal Daily Marie Ziegler, CRNP      OLANZapine  2.5 mg Oral Q6H PRN Dereje Banuelos MD      Or    OLANZapine  2.5 mg Intramuscular Q6H PRN Dereje Banuelos MD      OLANZapine  5 mg Oral Q6H PRN Dereje Banuelos MD      Or    OLANZapine  5 mg Intramuscular Q6H PRN Dereje Banuelos MD      ondansetron  4 mg Oral Q6H PRN Darin Lawton MD      polyethylene glycol  17 g Oral Daily PRN Marie Ziegler, CRNP      polyethylene glycol  17 g Oral Daily Kristen Logan, CRNP      senna-docusate sodium  2 tablet Oral HS Rehana Borrego PA-C       traZODone  50 mg Oral HS PRN JOSE ELIAS Figueroa       Risks / Benefits of Treatment:    Risks, benefits, and possible side effects of medications explained to patient and patient verbalizes understanding and agreement for treatment.    Counseling / Coordination of Care:    Total floor / unit time spent today 20 minutes. Greater than 50% of total time was spent with the patient and / or family counseling and / or coordination of care. A description of counseling / coordination of care:  Patient's progress discussed with staff in treatment team meeting.  Medications, treatment progress and treatment plan reviewed with patient.    Rehana Borrego PA-C 08/16/24

## 2024-08-16 NOTE — NURSING NOTE
Patient is calm with circumstantial thought. Patient continues to be paranoid regarding medications and would not take her medications from this writer this morning.  However, she was agreeable to taking her ordered clozaril, lorazepam and coreg for another nurse.  She did continue to refuse all other ordered AM meds.  Patient refused breakfast but ate 100% of her lunch.  Patient was clearer after lunch and denied having any hallucination or paranoid delusions.  She showered in the afternoon with the assistance of 2 BHTs.  She continues on 1:1 observation.

## 2024-08-16 NOTE — TREATMENT TEAM
08/16/24 0210   Powell Anxiety Scale   Anxious Mood 4   Tension 4   Fears 2   Insomnia 4   Intellectual 4   Depressed Mood 2   Somatic Complaints: Muscular 0   Somatic Complaints: Sensory 0   Cardiovascular Symptoms 0   Respiratory Symptoms 0   Gastrointestinal Symptoms 0   Genitourinary Symptoms 0   Autonomic Symptoms 0   Behavior at Interview 4   Powell Anxiety Score 24     Patient unable to sleep and reports feeling anxious. PRN Atarax 50 mg Offered to patient, Patient refused Initially but then agreed to take it 40 minutes later. PRN Atarax 50 mg given PO at 0300.

## 2024-08-16 NOTE — PLAN OF CARE
Problem: DISCHARGE PLANNING - CARE MANAGEMENT  Goal: Discharge to post-acute care or home with appropriate resources  Description: INTERVENTIONS:  - Conduct assessment to determine patient/family and health care team treatment goals, and need for post-acute services based on payer coverage, community resources, and patient preferences, and barriers to discharge  - Address psychosocial, clinical, and financial barriers to discharge as identified in assessment in conjunction with the patient/family and health care team  - Arrange appropriate level of post-acute services according to patient’s   needs and preference and payer coverage in collaboration with the physician and health care team  - Communicate with and update the patient/family, physician, and health care team regarding progress on the discharge plan  - Arrange appropriate transportation to post-acute venues  Outcome: Progressing     Problem: Prexisting or High Potential for Compromised Skin Integrity  Goal: Skin integrity is maintained or improved  Description: INTERVENTIONS:  - Identify patients at risk for skin breakdown  - Assess and monitor skin integrity  - Assess and monitor nutrition and hydration status  - Monitor labs   - Assess for incontinence   - Turn and reposition patient  - Assist with mobility/ambulation  - Relieve pressure over bony prominences  - Avoid friction and shearing  - Provide appropriate hygiene as needed including keeping skin clean and dry  - Evaluate need for skin moisturizer/barrier cream  - Collaborate with interdisciplinary team   - Patient/family teaching  - Consider wound care consult   Outcome: Progressing     Problem: Alteration in Thoughts and Perception  Goal: Treatment Goal: Gain control of psychotic behaviors/thinking, reduce/eliminate presenting symptoms and demonstrate improved reality functioning upon discharge  Outcome: Progressing  Goal: Verbalize thoughts and feelings  Description: Interventions:  - Promote  a nonjudgmental and trusting relationship with the patient through active listening and therapeutic communication  - Assess patient's level of functioning, behavior and potential for risk  - Engage patient in 1 on 1 interactions  - Encourage patient to express fears, feelings, frustrations, and discuss symptoms    - [place] patient to reality, help patient recognize reality-based thinking   - Administer medications as ordered and assess for potential side effects  - Provide the patient education related to the signs and symptoms of the illness and desired effects of prescribed medications  Outcome: Progressing  Goal: Refrain from acting on delusional thinking/internal stimuli  Description: Interventions:  - Monitor patient closely, per order   - Utilize least restrictive measures   - Set reasonable limits, give positive feedback for acceptable   - Administer medications as ordered and monitor of potential side effects  Outcome: Progressing  Goal: Agree to be compliant with medication regime, as prescribed and report medication side effects  Description: Interventions:  - Offer appropriate PRN medication and supervise ingestion; conduct AIMS, as needed   Outcome: Progressing  Goal: Attend and participate in unit activities, including therapeutic, recreational, and educational groups  Description: Interventions:  -Encourage Visitation and family involvement in care  Outcome: Progressing  Goal: Recognize dysfunctional thoughts, communicate reality-based thoughts at the time of discharge  Description: Interventions:  - Provide medication and psycho-education to assist patient in compliance and developing insight into his/her illness   Outcome: Progressing  Goal: Complete daily ADLs, including personal hygiene independently, as able  Description: Interventions:  - Observe, teach, and assist patient with ADLS  - Monitor and promote a balance of rest/activity, with adequate nutrition and elimination   Outcome: Progressing      Problem: Ineffective Coping  Goal: Identifies ineffective coping skills  Outcome: Progressing  Goal: Demonstrates healthy coping skills  Outcome: Progressing  Goal: Participates in unit activities  Description: Interventions:  - Provide therapeutic environment   - Provide required programming   - Redirect inappropriate behaviors   Outcome: Progressing  Goal: Patient/Family participate in treatment and DC plans  Description: Interventions:  - Provide therapeutic environment  Outcome: Progressing  Goal: Patient/Family verbalizes awareness of resources  Outcome: Progressing  Goal: Understands least restrictive measures  Description: Interventions:  - Utilize least restrictive behavior  Outcome: Progressing  Goal: Free from restraint events  Description: - Utilize least restrictive measures   - Provide behavioral interventions   - Redirect inappropriate behaviors   Outcome: Progressing     Problem: Risk for Self Injury/Neglect  Goal: Treatment Goal: Remain safe during length of stay, learn and adopt new coping skills, and be free of self-injurious ideation, impulses and acts at the time of discharge  Outcome: Progressing  Goal: Verbalize thoughts and feelings  Description: Interventions:  - Assess and re-assess patient's lethality and potential for self-injury  - Engage patient in 1:1 interactions, daily, for a minimum of 15 minutes  - Encourage patient to express feelings, fears, frustrations, hopes  - Establish rapport/trust with patient   Outcome: Progressing  Goal: Refrain from harming self  Description: Interventions:  - Monitor patient closely, per order  - Develop a trusting relationship  - Supervise medication ingestion, monitor effects and side effects   Outcome: Progressing  Goal: Attend and participate in unit activities, including therapeutic, recreational, and educational groups  Description: Interventions:  - Provide therapeutic and educational activities daily, encourage attendance and participation,  and document same in the medical record  - Obtain collateral information, encourage visitation and family involvement in care   Outcome: Progressing  Goal: Recognize maladaptive responses and adopt new coping mechanisms  Outcome: Progressing     Problem: Depression  Goal: Treatment Goal: Demonstrate behavioral control of depressive symptoms, verbalize feelings of improved mood/affect, and adopt new coping skills prior to discharge  Outcome: Progressing  Goal: Verbalize thoughts and feelings  Description: Interventions:  - Assess and re-assess patient's level of risk   - Engage patient in 1:1 interactions, daily, for a minimum of 15 minutes   - Encourage patient to express feelings, fears, frustrations, hopes   Outcome: Progressing  Goal: Refrain from isolation  Description: Interventions:  - Develop a trusting relationship   - Encourage socialization   Outcome: Progressing  Goal: Refrain from self-neglect  Outcome: Progressing  Goal: Attend and participate in unit activities, including therapeutic, recreational, and educational groups  Description: Interventions:  - Provide therapeutic and educational activities daily, encourage attendance and participation, and document same in the medical record   Outcome: Progressing     Problem: Anxiety  Goal: Anxiety is at manageable level  Description: Interventions:  - Assess and monitor patient's anxiety level.   - Monitor for signs and symptoms (heart palpitations, chest pain, shortness of breath, headaches, nausea, feeling jumpy, restlessness, irritable, apprehensive).   - Collaborate with interdisciplinary team and initiate plan and interventions as ordered.  - [place] patient to unit/surroundings  - Explain treatment plan  - Encourage participation in care  - Encourage verbalization of concerns/fears  - Identify coping mechanisms  - Assist in developing anxiety-reducing skills  - Administer/offer alternative therapies  - Limit or eliminate stimulants  Outcome:  Progressing     Problem: SAFETY,RESTRAINT: NV/NON-SELF DESTRUCTIVE BEHAVIOR  Goal: Remains free of harm/injury (restraint for non violent/non self-detsructive behavior)  Description: INTERVENTIONS:  - Instruct patient/family regarding restraint use   - Assess and monitor physiologic and psychological status   - Provide interventions and comfort measures to meet assessed patient needs   - Identify and implement measures to help patient regain control  - Assess readiness for release of restraint   Outcome: Progressing  Goal: Returns to optimal restraint-free functioning  Description: INTERVENTIONS:  - Assess the patient's behavior and symptoms that indicate continued need for restraint  - Identify and implement measures to help patient regain control  - Assess readiness for release of restraint   Outcome: Progressing     Problem: Potential for Falls  Goal: Patient will remain free of falls  Description: INTERVENTIONS:  - Educate patient on patient safety including physical limitations  - Instruct patient to call for assistance with activity   - Consult OT/PT to assist with strengthening/mobility   - Keep Call bell within reach  - Keep bed low and locked with side rails adjusted as appropriate  - Keep care items and personal belongings within reach  - Initiate and maintain comfort rounds  - Offer Toileting every 2 Hours, in advance of need  - Initiate/Maintain bed and chair alarm  - Obtain necessary fall risk management equipment: walker, wheelchair  - Apply yellow socks and bracelet for high fall risk patients  - Patient moved to room near nurses station  Outcome: Progressing     Problem: Nutrition/Hydration-ADULT  Goal: Nutrient/Hydration intake appropriate for improving, restoring or maintaining nutritional needs  Description: Monitor and assess patient's nutrition/hydration status for malnutrition. Collaborate with interdisciplinary team and initiate plan and interventions as ordered.  Monitor patient's weight and  dietary intake as ordered or per policy. Utilize nutrition screening tool and intervene as necessary. Determine patient's food preferences and provide high-protein, high-caloric foods as appropriate.     INTERVENTIONS:  - Monitor oral intake, urinary output, labs, and treatment plans  - Assess nutrition and hydration status and recommend course of action  - Evaluate amount of meals eaten  - Assist patient with eating if necessary   - Allow adequate time for meals  - Recommend/ encourage appropriate diets, oral nutritional supplements, and vitamin/mineral supplements  - Order, calculate, and assess calorie counts as needed  - Recommend, monitor, and adjust tube feedings and TPN/PPN based on assessed needs  - Assess need for intravenous fluids  - Provide specific nutrition/hydration education as appropriate  - Include patient/family/caregiver in decisions related to nutrition  Outcome: Progressing

## 2024-08-16 NOTE — CASE MANAGEMENT
"CM spoke to the patient who asked, \"what's on the menu?\" \"Never mind.\" The patient reported she would like to go home soon.   "

## 2024-08-17 LAB — GLUCOSE SERPL-MCNC: 115 MG/DL (ref 65–140)

## 2024-08-17 PROCEDURE — 99232 SBSQ HOSP IP/OBS MODERATE 35: CPT | Performed by: PSYCHIATRY & NEUROLOGY

## 2024-08-17 PROCEDURE — 82948 REAGENT STRIP/BLOOD GLUCOSE: CPT

## 2024-08-17 RX ORDER — LORAZEPAM 0.5 MG/1
0.5 TABLET ORAL 2 TIMES DAILY
Status: DISCONTINUED | OUTPATIENT
Start: 2024-08-17 | End: 2024-08-20

## 2024-08-17 RX ORDER — LORAZEPAM 2 MG/ML
1 INJECTION INTRAMUSCULAR 2 TIMES DAILY
Status: DISCONTINUED | OUTPATIENT
Start: 2024-08-17 | End: 2024-08-20

## 2024-08-17 RX ADMIN — CARVEDILOL 6.25 MG: 6.25 TABLET, FILM COATED ORAL at 08:38

## 2024-08-17 RX ADMIN — Medication 15 ML: at 08:41

## 2024-08-17 RX ADMIN — CYANOCOBALAMIN TAB 1000 MCG 1000 MCG: 1000 TAB at 08:38

## 2024-08-17 RX ADMIN — FLUTICASONE FUROATE 1 PUFF: 100 POWDER RESPIRATORY (INHALATION) at 08:41

## 2024-08-17 RX ADMIN — FAMOTIDINE 20 MG: 20 TABLET ORAL at 08:38

## 2024-08-17 RX ADMIN — CLOZAPINE 350 MG: 100 TABLET ORAL at 08:37

## 2024-08-17 RX ADMIN — POLYETHYLENE GLYCOL 3350 17 G: 17 POWDER, FOR SOLUTION ORAL at 08:42

## 2024-08-17 RX ADMIN — FLUOXETINE 40 MG: 20 SOLUTION ORAL at 08:41

## 2024-08-17 RX ADMIN — FAMOTIDINE 20 MG: 20 TABLET ORAL at 17:13

## 2024-08-17 RX ADMIN — LORAZEPAM 1 MG: 1 TABLET ORAL at 08:37

## 2024-08-17 RX ADMIN — LORAZEPAM 0.5 MG: 0.5 TABLET ORAL at 21:33

## 2024-08-17 RX ADMIN — ATORVASTATIN CALCIUM 10 MG: 10 TABLET, FILM COATED ORAL at 08:38

## 2024-08-17 RX ADMIN — SENNOSIDES AND DOCUSATE SODIUM 2 TABLET: 8.6; 5 TABLET ORAL at 21:33

## 2024-08-17 RX ADMIN — CARVEDILOL 6.25 MG: 6.25 TABLET, FILM COATED ORAL at 17:13

## 2024-08-17 RX ADMIN — MELATONIN TAB 3 MG 3 MG: 3 TAB at 21:33

## 2024-08-17 NOTE — NURSING NOTE
"Patient visible throughout milieu interacting with staff and peers. Patient less paranoid and suspicious on approach. Patient bright and accepted all medications. Patient still appears internally preoccupied at times. Patient stated \"I'm good\" to this writer during assessment. Patient denies SI/HI. Patient currently pacing hallways with 1:1 staff. Patient remains on 1:1 for safety and fall risk. Patient able to and encouraged to inform staff of any needs.   "

## 2024-08-17 NOTE — PROGRESS NOTES
"Progress Note - Behavioral Health     Meli Nowak 59 y.o. female MRN: [id number]   Unit/Bed#: [facility] 651-01 Encounter: [id number]    Behavior over the last 24 hours: unchanged.     Meli was seen today in follow-up for continuation of care. Per staff, she was not complaint with her morning medications seemingly paranoid and suspicious of staff. Meli is seen resting in bed. She is awake and more interactive with writer. She did accept a full breakfast as well as some various snack items yesterday. When questioned about there hallucinations, states, \"I really just miss my family\" and appeared tearful. She does not have any thoughts to join or be with them. Meli adamantly denies suicidal/homicidal ideation in addition to thoughts of self-injury. Meli presently is contacting for safety on the unit and feels comfortable to confide in staff if thoughts arise. At time of interview, does seem slightly improved, less paranoid & suspicious of writer and less internally preoccupied. She did not seem to remember our conversation about hallucinations of her father or mentions of going to [employer]. Meli did accept all of her medications this morning and has been tolerating one to one observation.     Sleep: slept better  Appetite: improved  Medication side effects: No  ROS: no complaints, all other systems are negative    Mental Status Evaluation:    Appearance:  age appropriate, marginal hygiene, dressed in hospital attire, overweight   Behavior:  More cooperative, spontaneous, fair eye contact   Speech:  normal rate, normal volume, normal pitch   Mood:  depressed, \"I miss my family.\"   Affect:  tearful   Thought Process:  perseverative   Associations: concrete associations   Thought Content:  Seemingly less paranoid   Perceptual Disturbances: no auditory hallucinations, no visual hallucinations, does not appear responding to internal stimuli   Risk Potential: Suicidal ideation - None at present  Homicidal ideation - None at " present  Potential for aggression - No   Sensorium:  oriented to person, place, and time/date   Memory:  recent and remote memory grossly intact   Consciousness:  alert and awake   Attention/Concentration: attention span and concentration are age appropriate   Insight:  poor   Judgment: poor   Gait/Station: in bed   Motor Activity: no abnormal movements     Vital signs in last 24 hours:    Temp:  [97.2 °F (36.2 °C)-98.5 °F (36.9 °C)] 98.1 °F (36.7 °C)  HR:  [71-96] 96  Resp:  [16-18] 18  BP: (108-135)/(50-74) 129/63    Laboratory results: I have personally reviewed all pertinent laboratory/tests results    Results from the past 24 hours:   Recent Results (from the past 24 hour(s))   Fingerstick Glucose (POCT)    Collection Time: 08/16/24  3:51 PM   Result Value Ref Range    POC Glucose 109 65 - 140 mg/dl   Fingerstick Glucose (POCT)    Collection Time: 08/17/24  7:11 AM   Result Value Ref Range    POC Glucose 115 65 - 140 mg/dl       Progress Toward Goals: poor insight    Assessment & Plan   Principal Problem:    Schizoaffective disorder, bipolar type (HCC)  Active Problems:    Medical clearance for psychiatric admission    Type 2 diabetes mellitus (HCC)    Obesity    Tobacco abuse    Hyperlipidemia    Esophageal reflux    Essential (primary) hypertension    Vitamin B12 deficiency    Hypoxia    Ambulatory dysfunction      Recommended Treatment:     Planned medication and treatment changes:    All current active medications have been reviewed  Encourage group therapy, milieu therapy and occupational therapy  Behavioral Health checks every 7 minutes    Will reduce ativan to 0.5 mg BID.  Continue all other medications. Labs including CRP, CBC/diff & CK and new Clozaril level this upcoming Monday 8/19/24. Patient reported to having a BM yesterday and is not constipated.    Continue with mouth checks and 1:1 while awake (can consider discontinuation if she continues to clear and is less impulsive).    Discharge planning  ongoing - recently determined to be nursing home clinically eligible. Most likely require discharge for post-acute rehab when psychiatrically stable vs discharge home.     Current Facility-Administered Medications   Medication Dose Route Frequency Provider Last Rate    acetaminophen  650 mg Oral Q4H PRN Marie Ziegler, CRNP      acetaminophen  650 mg Oral Q4H PRN Marie Ziegler, CRNP      acetaminophen  975 mg Oral Q6H PRN Marie Ziegler, JOSE ELIAS      atorvastatin  10 mg Oral Daily Marie Ziegler, CRNP      bisacodyl  10 mg Rectal Daily PRN Marie Ziegler, CRNP      carvedilol  6.25 mg Oral BID With Meals Marie Ziegler, JOSE ELIAS      cloNIDine  0.1 mg Transdermal Weekly Marie Ziegler, JOSE ELIAS      cloZAPine  350 mg Oral Daily Rehana Borrego PA-C      cyanocobalamin  1,000 mcg Oral Daily Marie Ziegler, JOSE ELIAS      famotidine  20 mg Oral BID Shan Fitzgerald DO      FLUoxetine  40 mg Oral Daily Dereje Banuelos MD      fluticasone  1 puff Inhalation Daily JOSE ELIAS Figueroa      hydrOXYzine HCL  25 mg Oral Q6H PRN Max 4/day Marie Ziegler, JOSE ELIAS      hydrOXYzine HCL  50 mg Oral Q6H PRN Max 4/day JOSE ELIAS Figueroa      ipratropium-albuterol  3 mL Nebulization Q4H PRN Darin Lawton MD      LORazepam  1 mg Intramuscular Q6H PRN Max 3/day JOSE ELIAS Figueroa      LORazepam  1 mg Oral BID Dereje Banuelos MD      Or    LORazepam  1 mg Intramuscular BID Dereje Banuelos MD      LORazepam  1 mg Oral Q6H PRN Max 3/day JOSE ELIAS Figueroa      melatonin  3 mg Oral HS JOSE ELIAS Figueroa      Multivitamin  15 mL Oral Daily JOSE ELIAS Figueroa      nicotine  1 patch Transdermal Daily JOSE ELIAS Figueroa      OLANZapine  2.5 mg Oral Q6H PRN Dereje Banuelos MD      Or    OLANZapine  2.5 mg Intramuscular Q6H PRN Dereje Banuelos MD      OLANZapine  5 mg Oral Q6H PRN Dereje Banuelos MD      Or    OLANZapine  5 mg Intramuscular Q6H PRN Dereje Banuelos MD      ondansetron  4 mg Oral Q6H PRN Darin Lawton MD       polyethylene glycol  17 g Oral Daily PRN JOSE ELIAS Figueroa      polyethylene glycol  17 g Oral Daily Kristenabdoulaye TomasJOSE ELIAS Weller      senna-docusate sodium  2 tablet Oral HS Rehana Borrego PA-C      traZODone  50 mg Oral HS PRN JOSE ELIAS Figueroa       Risks / Benefits of Treatment:    Risks, benefits, and possible side effects of medications explained to patient and patient verbalizes understanding and agreement for treatment.    Counseling / Coordination of Care:    Total floor / unit time spent today 20 minutes. Greater than 50% of total time was spent with the patient and / or family counseling and / or coordination of care. A description of counseling / coordination of care:  Patient's progress discussed with staff in treatment team meeting.  Medications, treatment progress and treatment plan reviewed with patient.    Rehana Borrego PA-C 08/17/24

## 2024-08-17 NOTE — PLAN OF CARE
Problem: DISCHARGE PLANNING - CARE MANAGEMENT  Goal: Discharge to post-acute care or home with appropriate resources  Description: INTERVENTIONS:  - Conduct assessment to determine patient/family and health care team treatment goals, and need for post-acute services based on payer coverage, community resources, and patient preferences, and barriers to discharge  - Address psychosocial, clinical, and financial barriers to discharge as identified in assessment in conjunction with the patient/family and health care team  - Arrange appropriate level of post-acute services according to patient’s   needs and preference and payer coverage in collaboration with the physician and health care team  - Communicate with and update the patient/family, physician, and health care team regarding progress on the discharge plan  - Arrange appropriate transportation to post-acute venues  Outcome: Progressing     Problem: Prexisting or High Potential for Compromised Skin Integrity  Goal: Skin integrity is maintained or improved  Description: INTERVENTIONS:  - Identify patients at risk for skin breakdown  - Assess and monitor skin integrity  - Assess and monitor nutrition and hydration status  - Monitor labs   - Assess for incontinence   - Turn and reposition patient  - Assist with mobility/ambulation  - Relieve pressure over bony prominences  - Avoid friction and shearing  - Provide appropriate hygiene as needed including keeping skin clean and dry  - Evaluate need for skin moisturizer/barrier cream  - Collaborate with interdisciplinary team   - Patient/family teaching  - Consider wound care consult   Outcome: Progressing     Problem: Alteration in Thoughts and Perception  Goal: Treatment Goal: Gain control of psychotic behaviors/thinking, reduce/eliminate presenting symptoms and demonstrate improved reality functioning upon discharge  Outcome: Progressing  Goal: Verbalize thoughts and feelings  Description: Interventions:  - Promote  a nonjudgmental and trusting relationship with the patient through active listening and therapeutic communication  - Assess patient's level of functioning, behavior and potential for risk  - Engage patient in 1 on 1 interactions  - Encourage patient to express fears, feelings, frustrations, and discuss symptoms    - [place] patient to reality, help patient recognize reality-based thinking   - Administer medications as ordered and assess for potential side effects  - Provide the patient education related to the signs and symptoms of the illness and desired effects of prescribed medications  Outcome: Progressing  Goal: Refrain from acting on delusional thinking/internal stimuli  Description: Interventions:  - Monitor patient closely, per order   - Utilize least restrictive measures   - Set reasonable limits, give positive feedback for acceptable   - Administer medications as ordered and monitor of potential side effects  Outcome: Progressing  Goal: Agree to be compliant with medication regime, as prescribed and report medication side effects  Description: Interventions:  - Offer appropriate PRN medication and supervise ingestion; conduct AIMS, as needed   Outcome: Progressing  Goal: Attend and participate in unit activities, including therapeutic, recreational, and educational groups  Description: Interventions:  -Encourage Visitation and family involvement in care  Outcome: Progressing  Goal: Recognize dysfunctional thoughts, communicate reality-based thoughts at the time of discharge  Description: Interventions:  - Provide medication and psycho-education to assist patient in compliance and developing insight into his/her illness   Outcome: Progressing  Goal: Complete daily ADLs, including personal hygiene independently, as able  Description: Interventions:  - Observe, teach, and assist patient with ADLS  - Monitor and promote a balance of rest/activity, with adequate nutrition and elimination   Outcome: Progressing      Problem: Ineffective Coping  Goal: Identifies ineffective coping skills  Outcome: Progressing  Goal: Demonstrates healthy coping skills  Outcome: Progressing  Goal: Participates in unit activities  Description: Interventions:  - Provide therapeutic environment   - Provide required programming   - Redirect inappropriate behaviors   Outcome: Progressing  Goal: Patient/Family participate in treatment and DC plans  Description: Interventions:  - Provide therapeutic environment  Outcome: Progressing  Goal: Patient/Family verbalizes awareness of resources  Outcome: Progressing  Goal: Understands least restrictive measures  Description: Interventions:  - Utilize least restrictive behavior  Outcome: Progressing  Goal: Free from restraint events  Description: - Utilize least restrictive measures   - Provide behavioral interventions   - Redirect inappropriate behaviors   Outcome: Progressing     Problem: Risk for Self Injury/Neglect  Goal: Treatment Goal: Remain safe during length of stay, learn and adopt new coping skills, and be free of self-injurious ideation, impulses and acts at the time of discharge  Outcome: Progressing  Goal: Verbalize thoughts and feelings  Description: Interventions:  - Assess and re-assess patient's lethality and potential for self-injury  - Engage patient in 1:1 interactions, daily, for a minimum of 15 minutes  - Encourage patient to express feelings, fears, frustrations, hopes  - Establish rapport/trust with patient   Outcome: Progressing  Goal: Refrain from harming self  Description: Interventions:  - Monitor patient closely, per order  - Develop a trusting relationship  - Supervise medication ingestion, monitor effects and side effects   Outcome: Progressing  Goal: Attend and participate in unit activities, including therapeutic, recreational, and educational groups  Description: Interventions:  - Provide therapeutic and educational activities daily, encourage attendance and participation,  and document same in the medical record  - Obtain collateral information, encourage visitation and family involvement in care   Outcome: Progressing  Goal: Recognize maladaptive responses and adopt new coping mechanisms  Outcome: Progressing     Problem: Depression  Goal: Treatment Goal: Demonstrate behavioral control of depressive symptoms, verbalize feelings of improved mood/affect, and adopt new coping skills prior to discharge  Outcome: Progressing  Goal: Verbalize thoughts and feelings  Description: Interventions:  - Assess and re-assess patient's level of risk   - Engage patient in 1:1 interactions, daily, for a minimum of 15 minutes   - Encourage patient to express feelings, fears, frustrations, hopes   Outcome: Progressing  Goal: Refrain from isolation  Description: Interventions:  - Develop a trusting relationship   - Encourage socialization   Outcome: Progressing  Goal: Refrain from self-neglect  Outcome: Progressing  Goal: Attend and participate in unit activities, including therapeutic, recreational, and educational groups  Description: Interventions:  - Provide therapeutic and educational activities daily, encourage attendance and participation, and document same in the medical record   Outcome: Progressing     Problem: Anxiety  Goal: Anxiety is at manageable level  Description: Interventions:  - Assess and monitor patient's anxiety level.   - Monitor for signs and symptoms (heart palpitations, chest pain, shortness of breath, headaches, nausea, feeling jumpy, restlessness, irritable, apprehensive).   - Collaborate with interdisciplinary team and initiate plan and interventions as ordered.  - [place] patient to unit/surroundings  - Explain treatment plan  - Encourage participation in care  - Encourage verbalization of concerns/fears  - Identify coping mechanisms  - Assist in developing anxiety-reducing skills  - Administer/offer alternative therapies  - Limit or eliminate stimulants  Outcome:  Progressing     Problem: SAFETY,RESTRAINT: NV/NON-SELF DESTRUCTIVE BEHAVIOR  Goal: Remains free of harm/injury (restraint for non violent/non self-detsructive behavior)  Description: INTERVENTIONS:  - Instruct patient/family regarding restraint use   - Assess and monitor physiologic and psychological status   - Provide interventions and comfort measures to meet assessed patient needs   - Identify and implement measures to help patient regain control  - Assess readiness for release of restraint   Outcome: Progressing  Goal: Returns to optimal restraint-free functioning  Description: INTERVENTIONS:  - Assess the patient's behavior and symptoms that indicate continued need for restraint  - Identify and implement measures to help patient regain control  - Assess readiness for release of restraint   Outcome: Progressing     Problem: Potential for Falls  Goal: Patient will remain free of falls  Description: INTERVENTIONS:  - Educate patient on patient safety including physical limitations  - Instruct patient to call for assistance with activity   - Consult OT/PT to assist with strengthening/mobility   - Keep Call bell within reach  - Keep bed low and locked with side rails adjusted as appropriate  - Keep care items and personal belongings within reach  - Initiate and maintain comfort rounds  - Offer Toileting every 2 Hours, in advance of need  - Initiate/Maintain bed and chair alarm  - Obtain necessary fall risk management equipment: walker, wheelchair  - Apply yellow socks and bracelet for high fall risk patients  - Patient moved to room near nurses station  Outcome: Progressing     Problem: Nutrition/Hydration-ADULT  Goal: Nutrient/Hydration intake appropriate for improving, restoring or maintaining nutritional needs  Description: Monitor and assess patient's nutrition/hydration status for malnutrition. Collaborate with interdisciplinary team and initiate plan and interventions as ordered.  Monitor patient's weight and  dietary intake as ordered or per policy. Utilize nutrition screening tool and intervene as necessary. Determine patient's food preferences and provide high-protein, high-caloric foods as appropriate.     INTERVENTIONS:  - Monitor oral intake, urinary output, labs, and treatment plans  - Assess nutrition and hydration status and recommend course of action  - Evaluate amount of meals eaten  - Assist patient with eating if necessary   - Allow adequate time for meals  - Recommend/ encourage appropriate diets, oral nutritional supplements, and vitamin/mineral supplements  - Order, calculate, and assess calorie counts as needed  - Recommend, monitor, and adjust tube feedings and TPN/PPN based on assessed needs  - Assess need for intravenous fluids  - Provide specific nutrition/hydration education as appropriate  - Include patient/family/caregiver in decisions related to nutrition  Outcome: Progressing

## 2024-08-18 LAB
GLUCOSE SERPL-MCNC: 106 MG/DL (ref 65–140)
GLUCOSE SERPL-MCNC: 114 MG/DL (ref 65–140)

## 2024-08-18 PROCEDURE — 99232 SBSQ HOSP IP/OBS MODERATE 35: CPT | Performed by: PSYCHIATRY & NEUROLOGY

## 2024-08-18 PROCEDURE — 82948 REAGENT STRIP/BLOOD GLUCOSE: CPT

## 2024-08-18 RX ORDER — MAGNESIUM HYDROXIDE/ALUMINUM HYDROXICE/SIMETHICONE 120; 1200; 1200 MG/30ML; MG/30ML; MG/30ML
30 SUSPENSION ORAL EVERY 4 HOURS PRN
Status: DISCONTINUED | OUTPATIENT
Start: 2024-08-18 | End: 2024-12-30 | Stop reason: HOSPADM

## 2024-08-18 RX ADMIN — MELATONIN TAB 3 MG 3 MG: 3 TAB at 21:37

## 2024-08-18 RX ADMIN — FLUTICASONE FUROATE 1 PUFF: 100 POWDER RESPIRATORY (INHALATION) at 09:11

## 2024-08-18 RX ADMIN — FAMOTIDINE 20 MG: 20 TABLET ORAL at 18:16

## 2024-08-18 RX ADMIN — SENNOSIDES AND DOCUSATE SODIUM 2 TABLET: 8.6; 5 TABLET ORAL at 21:37

## 2024-08-18 RX ADMIN — LORAZEPAM 0.5 MG: 0.5 TABLET ORAL at 21:37

## 2024-08-18 RX ADMIN — LORAZEPAM 0.5 MG: 0.5 TABLET ORAL at 09:09

## 2024-08-18 RX ADMIN — CLONIDINE 0.1 MG: 0.1 PATCH TRANSDERMAL at 09:15

## 2024-08-18 RX ADMIN — CARVEDILOL 6.25 MG: 6.25 TABLET, FILM COATED ORAL at 16:44

## 2024-08-18 RX ADMIN — CLOZAPINE 350 MG: 100 TABLET ORAL at 09:09

## 2024-08-18 RX ADMIN — ALUMINUM HYDROXIDE, MAGNESIUM HYDROXIDE, AND DIMETHICONE 30 ML: 200; 20; 200 SUSPENSION ORAL at 13:19

## 2024-08-18 NOTE — NURSING NOTE
Patient is calm and cooperative with care. Denies all psych s/s. Medication compliant. Patient is visible in the dayroom with 1:1 observer in close proximity. Patient encouraged to make needs known. Safety checks ongoing.

## 2024-08-18 NOTE — PLAN OF CARE
Problem: Prexisting or High Potential for Compromised Skin Integrity  Goal: Skin integrity is maintained or improved  Description: INTERVENTIONS:  - Identify patients at risk for skin breakdown  - Assess and monitor skin integrity  - Assess and monitor nutrition and hydration status  - Monitor labs   - Assess for incontinence   - Turn and reposition patient  - Assist with mobility/ambulation  - Relieve pressure over bony prominences  - Avoid friction and shearing  - Provide appropriate hygiene as needed including keeping skin clean and dry  - Evaluate need for skin moisturizer/barrier cream  - Collaborate with interdisciplinary team   - Patient/family teaching  - Consider wound care consult   Outcome: Progressing     Problem: Alteration in Thoughts and Perception  Goal: Treatment Goal: Gain control of psychotic behaviors/thinking, reduce/eliminate presenting symptoms and demonstrate improved reality functioning upon discharge  Outcome: Progressing  Goal: Verbalize thoughts and feelings  Description: Interventions:  - Promote a nonjudgmental and trusting relationship with the patient through active listening and therapeutic communication  - Assess patient's level of functioning, behavior and potential for risk  - Engage patient in 1 on 1 interactions  - Encourage patient to express fears, feelings, frustrations, and discuss symptoms    - [place] patient to reality, help patient recognize reality-based thinking   - Administer medications as ordered and assess for potential side effects  - Provide the patient education related to the signs and symptoms of the illness and desired effects of prescribed medications  Outcome: Progressing  Goal: Refrain from acting on delusional thinking/internal stimuli  Description: Interventions:  - Monitor patient closely, per order   - Utilize least restrictive measures   - Set reasonable limits, give positive feedback for acceptable   - Administer medications as ordered and monitor  of potential side effects  Outcome: Progressing  Goal: Agree to be compliant with medication regime, as prescribed and report medication side effects  Description: Interventions:  - Offer appropriate PRN medication and supervise ingestion; conduct AIMS, as needed   Outcome: Progressing  Goal: Recognize dysfunctional thoughts, communicate reality-based thoughts at the time of discharge  Description: Interventions:  - Provide medication and psycho-education to assist patient in compliance and developing insight into his/her illness   Outcome: Progressing  Goal: Complete daily ADLs, including personal hygiene independently, as able  Description: Interventions:  - Observe, teach, and assist patient with ADLS  - Monitor and promote a balance of rest/activity, with adequate nutrition and elimination   Outcome: Progressing     Problem: Risk for Self Injury/Neglect  Goal: Treatment Goal: Remain safe during length of stay, learn and adopt new coping skills, and be free of self-injurious ideation, impulses and acts at the time of discharge  Outcome: Progressing  Goal: Verbalize thoughts and feelings  Description: Interventions:  - Assess and re-assess patient's lethality and potential for self-injury  - Engage patient in 1:1 interactions, daily, for a minimum of 15 minutes  - Encourage patient to express feelings, fears, frustrations, hopes  - Establish rapport/trust with patient   Outcome: Progressing  Goal: Refrain from harming self  Description: Interventions:  - Monitor patient closely, per order  - Develop a trusting relationship  - Supervise medication ingestion, monitor effects and side effects   Outcome: Progressing  Goal: Recognize maladaptive responses and adopt new coping mechanisms  Outcome: Progressing     Problem: Depression  Goal: Treatment Goal: Demonstrate behavioral control of depressive symptoms, verbalize feelings of improved mood/affect, and adopt new coping skills prior to discharge  Outcome:  Progressing  Goal: Verbalize thoughts and feelings  Description: Interventions:  - Assess and re-assess patient's level of risk   - Engage patient in 1:1 interactions, daily, for a minimum of 15 minutes   - Encourage patient to express feelings, fears, frustrations, hopes   Outcome: Progressing  Goal: Refrain from isolation  Description: Interventions:  - Develop a trusting relationship   - Encourage socialization   Outcome: Progressing  Goal: Refrain from self-neglect  Outcome: Progressing     Problem: Anxiety  Goal: Anxiety is at manageable level  Description: Interventions:  - Assess and monitor patient's anxiety level.   - Monitor for signs and symptoms (heart palpitations, chest pain, shortness of breath, headaches, nausea, feeling jumpy, restlessness, irritable, apprehensive).   - Collaborate with interdisciplinary team and initiate plan and interventions as ordered.  - [place] patient to unit/surroundings  - Explain treatment plan  - Encourage participation in care  - Encourage verbalization of concerns/fears  - Identify coping mechanisms  - Assist in developing anxiety-reducing skills  - Administer/offer alternative therapies  - Limit or eliminate stimulants  Outcome: Progressing     Problem: Potential for Falls  Goal: Patient will remain free of falls  Description: INTERVENTIONS:  - Educate patient on patient safety including physical limitations  - Instruct patient to call for assistance with activity   - Consult OT/PT to assist with strengthening/mobility   - Keep Call bell within reach  - Keep bed low and locked with side rails adjusted as appropriate  - Keep care items and personal belongings within reach  - Initiate and maintain comfort rounds  - Offer Toileting every 2 Hours, in advance of need  - Initiate/Maintain bed and chair alarm  - Obtain necessary fall risk management equipment: walker, wheelchair  - Apply yellow socks and bracelet for high fall risk patients  - Patient moved to room near nurses  station  Outcome: Progressing

## 2024-08-18 NOTE — NURSING NOTE
"Patient is mostly withdrawn to her room however, she did come out and sit in the dining room for breakfast and lunch today.  She ate 50% of both meals.  Patient was compliant with the PO lorazepam and clozaril and agreed to clonidine patch.  Coreg was held due to low blood pressure.  Patient maintained on 1:1 observation while awake due to unsteady gait.      Patient with c/o indigestion.  [facility] NP called and order received for Maalox PRN.  Maalox given at 1319.  Patient rechecked 1/2 hr later and she stated \"the maalox helped\".  "

## 2024-08-18 NOTE — PROGRESS NOTES
"Progress Note - Behavioral Health     Meli Nowak 59 y.o. female MRN: [id number]   Unit/Bed#: [facility] 651-01 Encounter: [id number]    Behavior over the last 24 hours: unchanged.     Meli was seen today in follow-up for continuation of care. Per staff, yesterday patient was visible in the milieu interacting better with staff and seemed to be less paranoid. Meli is seen in bed. She reports to feeling, \"tired\" today. Otherwise reports to tolerating medication adjustments. Has been accepting meals (25-50%) and snacks. Meli adamantly denies suicidal/homicidal ideation in addition to thoughts of self-injury. Meli presently is contacting for safety on the unit and feels comfortable to confide in staff if thoughts arise. At time of interview, she has been denying hallucinations. Meli has been complaint with medications and tolerating without any side effects.     Sleep: normal  Appetite: fair  Medication side effects: No  ROS: no complaints, all other systems are negative    Mental Status Evaluation:    Appearance:  age appropriate, marginal hygiene, dressed in hospital attire, overweight   Behavior:  Cooperative, less guarded   Speech:  normal rate, normal volume, normal pitch   Mood:  anxious   Affect:  constricted   Thought Process:  concrete   Associations: concrete associations   Thought Content:  Less paranoid and religiously preoccupied   Perceptual Disturbances: no auditory hallucinations, no visual hallucinations, does not appear responding to internal stimuli   Risk Potential: Suicidal ideation - None at present  Homicidal ideation - None at present  Potential for aggression - No   Sensorium:  oriented to person, place, and time/date   Memory:  recent and remote memory grossly intact   Consciousness:  alert and awake   Attention/Concentration: attention span and concentration are age appropriate   Insight:  limited   Judgment: limited   Gait/Station: in bed   Motor Activity: no abnormal movements     Vital signs " in last 24 hours:    Temp:  [97.7 °F (36.5 °C)-98.6 °F (37 °C)] 98.6 °F (37 °C)  HR:  [] 81  Resp:  [17-18] 17  BP: (103-124)/(58-68) 103/68    Laboratory results: I have personally reviewed all pertinent laboratory/tests results    Results from the past 24 hours:   Recent Results (from the past 24 hour(s))   Fingerstick Glucose (POCT)    Collection Time: 08/18/24  7:28 AM   Result Value Ref Range    POC Glucose 114 65 - 140 mg/dl       Progress Toward Goals: poor insight    Assessment & Plan   Principal Problem:    Schizoaffective disorder, bipolar type (HCC)  Active Problems:    Medical clearance for psychiatric admission    Type 2 diabetes mellitus (HCC)    Obesity    Tobacco abuse    Hyperlipidemia    Esophageal reflux    Essential (primary) hypertension    Vitamin B12 deficiency    Hypoxia    Ambulatory dysfunction      Recommended Treatment:     Planned medication and treatment changes:    All current active medications have been reviewed  Encourage group therapy, milieu therapy and occupational therapy  Behavioral Health checks every 7 minutes    Continue all medications. Labs including CRP, CBC/diff & CK and new Clozaril level this upcoming Monday 8/19/24. She reported BM yesterday evening.     Continue with mouth checks and 1:1 while awake (can consider discontinuation if she continues to clear and is less impulsive).    Discharge planning ongoing - recently determined to be nursing home clinically eligible. Most likely require discharge for post-acute rehab when psychiatrically stable vs discharge home.     Current Facility-Administered Medications   Medication Dose Route Frequency Provider Last Rate    acetaminophen  650 mg Oral Q4H PRN JOSE ELIAS Figueroa      acetaminophen  650 mg Oral Q4H PRN JOSE ELIAS Figueroa      acetaminophen  975 mg Oral Q6H PRN JOSE ELIAS Figueroa      atorvastatin  10 mg Oral Daily JOSE ELIAS Figueroa      bisacodyl  10 mg Rectal Daily PRN JOSE ELIAS Figueroa       carvedilol  6.25 mg Oral BID With Meals JOSE ELIAS Figueroa      cloNIDine  0.1 mg Transdermal Weekly JOSE ELIAS Figueroa      cloZAPine  350 mg Oral Daily Rehana Borrego PA-C      cyanocobalamin  1,000 mcg Oral Daily Marie Ziegler, JOSE ELIAS      famotidine  20 mg Oral BID Shan Fitzgerald DO      FLUoxetine  40 mg Oral Daily Dereje Banuelos MD      fluticasone  1 puff Inhalation Daily JOSE ELIAS Figueroa      hydrOXYzine HCL  25 mg Oral Q6H PRN Max 4/day Marie Ziegler, JOSE ELIAS      hydrOXYzine HCL  50 mg Oral Q6H PRN Max 4/day JOSE ELIAS Figueroa      ipratropium-albuterol  3 mL Nebulization Q4H PRN Darin Lawton MD      LORazepam  1 mg Intramuscular Q6H PRN Max 3/day Marie Ziegler, JOSE ELIAS      LORazepam  0.5 mg Oral BID Rehana Borrego PA-C      Or    LORazepam  1 mg Intramuscular BID Rehana Borrego PA-C      LORazepam  1 mg Oral Q6H PRN Max 3/day JOSE ELIAS Figueroa      melatonin  3 mg Oral HS JOSE ELIAS Figueroa      Multivitamin  15 mL Oral Daily JOSE ELIAS Figueroa      nicotine  1 patch Transdermal Daily JOSE ELIAS Figueroa      OLANZapine  2.5 mg Oral Q6H PRN Dereje Banuelos MD      Or    OLANZapine  2.5 mg Intramuscular Q6H PRN Dereje Banuelos MD      OLANZapine  5 mg Oral Q6H PRN Dereje Banuelos MD      Or    OLANZapine  5 mg Intramuscular Q6H PRN Dereje Banuelos MD      ondansetron  4 mg Oral Q6H PRN Darin Lawton MD      polyethylene glycol  17 g Oral Daily PRN JOSE ELIAS Figueroa      polyethylene glycol  17 g Oral Daily JOSE ELIAS Ortega      senna-docusate sodium  2 tablet Oral HS Rehana Borrego PA-C      traZODone  50 mg Oral HS PRN JOSE ELIAS Figueroa       Risks / Benefits of Treatment:    Risks, benefits, and possible side effects of medications explained to patient and patient verbalizes understanding and agreement for treatment.    Counseling / Coordination of Care:    Total floor / unit time spent today 20 minutes. Greater than 50% of  total time was spent with the patient and / or family counseling and / or coordination of care. A description of counseling / coordination of care:  Patient's progress discussed with staff in treatment team meeting.  Medications, treatment progress and treatment plan reviewed with patient.    Rehana Borrego PA-C 08/18/24

## 2024-08-18 NOTE — NURSING NOTE
Pt1:1 discontinued. Pt remains in bed most of the day, up for meals poor appetite for dinner. Flat, somnolent, cooperative, able to make needs known.  Denies SI/HI/AVH, anxiety or depression. Medication compliant. Will continue to monitor.

## 2024-08-19 LAB
BASOPHILS # BLD AUTO: 0.06 THOUSANDS/ΜL (ref 0–0.1)
BASOPHILS NFR BLD AUTO: 1 % (ref 0–1)
CK SERPL-CCNC: 20 U/L (ref 26–192)
CLOZAPINE SERPL-MCNC: 559 NG/ML (ref 350–900)
CRP SERPL QL: 9.1 MG/L
EOSINOPHIL # BLD AUTO: 0 THOUSAND/ΜL (ref 0–0.61)
EOSINOPHIL NFR BLD AUTO: 0 % (ref 0–6)
ERYTHROCYTE [DISTWIDTH] IN BLOOD BY AUTOMATED COUNT: 15.9 % (ref 11.6–15.1)
GLUCOSE SERPL-MCNC: 106 MG/DL (ref 65–140)
HCT VFR BLD AUTO: 45.2 % (ref 34.8–46.1)
HGB BLD-MCNC: 14.2 G/DL (ref 11.5–15.4)
IMM GRANULOCYTES # BLD AUTO: 0.02 THOUSAND/UL (ref 0–0.2)
IMM GRANULOCYTES NFR BLD AUTO: 0 % (ref 0–2)
LYMPHOCYTES # BLD AUTO: 1.77 THOUSANDS/ΜL (ref 0.6–4.47)
LYMPHOCYTES NFR BLD AUTO: 25 % (ref 14–44)
MCH RBC QN AUTO: 29.5 PG (ref 26.8–34.3)
MCHC RBC AUTO-ENTMCNC: 31.4 G/DL (ref 31.4–37.4)
MCV RBC AUTO: 94 FL (ref 82–98)
MONOCYTES # BLD AUTO: 0.82 THOUSAND/ΜL (ref 0.17–1.22)
MONOCYTES NFR BLD AUTO: 11 % (ref 4–12)
NEUTROPHILS # BLD AUTO: 4.5 THOUSANDS/ΜL (ref 1.85–7.62)
NEUTS SEG NFR BLD AUTO: 63 % (ref 43–75)
NRBC BLD AUTO-RTO: 0 /100 WBCS
PLATELET # BLD AUTO: 227 THOUSANDS/UL (ref 149–390)
PMV BLD AUTO: 9.3 FL (ref 8.9–12.7)
RBC # BLD AUTO: 4.81 MILLION/UL (ref 3.81–5.12)
WBC # BLD AUTO: 7.17 THOUSAND/UL (ref 4.31–10.16)

## 2024-08-19 PROCEDURE — 82948 REAGENT STRIP/BLOOD GLUCOSE: CPT

## 2024-08-19 PROCEDURE — 86140 C-REACTIVE PROTEIN: CPT

## 2024-08-19 PROCEDURE — 80159 DRUG ASSAY CLOZAPINE: CPT | Performed by: PSYCHIATRY & NEUROLOGY

## 2024-08-19 PROCEDURE — 82550 ASSAY OF CK (CPK): CPT

## 2024-08-19 PROCEDURE — 99232 SBSQ HOSP IP/OBS MODERATE 35: CPT | Performed by: STUDENT IN AN ORGANIZED HEALTH CARE EDUCATION/TRAINING PROGRAM

## 2024-08-19 PROCEDURE — 85025 COMPLETE CBC W/AUTO DIFF WBC: CPT

## 2024-08-19 RX ORDER — CLOZAPINE 200 MG/1
400 TABLET ORAL DAILY
Status: DISCONTINUED | OUTPATIENT
Start: 2024-08-20 | End: 2024-08-21

## 2024-08-19 RX ADMIN — LORAZEPAM 0.5 MG: 0.5 TABLET ORAL at 21:41

## 2024-08-19 RX ADMIN — CARVEDILOL 6.25 MG: 6.25 TABLET, FILM COATED ORAL at 09:30

## 2024-08-19 RX ADMIN — CLOZAPINE 350 MG: 100 TABLET ORAL at 09:29

## 2024-08-19 RX ADMIN — SENNOSIDES AND DOCUSATE SODIUM 2 TABLET: 8.6; 5 TABLET ORAL at 21:41

## 2024-08-19 RX ADMIN — FAMOTIDINE 20 MG: 20 TABLET ORAL at 17:07

## 2024-08-19 RX ADMIN — FLUTICASONE FUROATE 1 PUFF: 100 POWDER RESPIRATORY (INHALATION) at 09:30

## 2024-08-19 RX ADMIN — CARVEDILOL 6.25 MG: 6.25 TABLET, FILM COATED ORAL at 17:07

## 2024-08-19 RX ADMIN — MELATONIN TAB 3 MG 3 MG: 3 TAB at 21:41

## 2024-08-19 RX ADMIN — LORAZEPAM 0.5 MG: 0.5 TABLET ORAL at 09:30

## 2024-08-19 RX ADMIN — LORAZEPAM 1 MG: 1 TABLET ORAL at 17:34

## 2024-08-19 NOTE — NURSING NOTE
Patient is mostly withdrawn to her room however, she did come out and sit in the dining room for breakfast and lunch today. She ate 100% of both meals. Patient was compliant with the PO lorazepam, clozaril and coreg this morning.  Patient continues to be delusional and have A/V hallucinations at times.  She denies SI and HI to this writer this shift.  No group attendance noted.  Safety precautions maintained.

## 2024-08-19 NOTE — PROGRESS NOTES
Progress Note - Behavioral Health   Meli Nowak 59 y.o. female MRN: [id number]  Unit/Bed#: [facility] 651-01 Encounter: [id number]    Patient was seen today for continuation of care, records reviewed and patient was discussed with the morning case review team.    Meli was seen today for psychiatric follow-up.  On assessment today, Meli was less irritable and more organized.  Preoccupied with seeing a dentist, patient expresses her desire to have veneers placed.  Educated about current access to services, patient is understanding that she will have to follow up outpatient.  Still often noted to be paranoid with bizarre delusions, patient in addition slowly improving with titration of Clozaril.  Weekly labs obtained including CBC, CK and CRP, no significant changes noted at this time with clozapine level currently 559.  Will increase nightly dose to 400 mg at this time.  No other changes to be made, tentative discharge ongoing.    Meli denies acute suicidal/self-harm ideation/intent/plan upon direct inquiry at this time.  Meli remains behaviorally appropriate, no agitation or aggression noted on exam or in report.  Meli also denies HI/AH/VH, and does not appear overtly manic.  No overt delusions or paranoia are verbalized. Impulse control is intact.  Meli remains adherent to her current psychotropic medication regimen and denies any side effects from medications, as well as none noted on exam.    Vitals:  Vitals:    08/19/24 0723   BP: 132/63   Pulse: 81   Resp: 17   Temp: 97.6 °F (36.4 °C)   SpO2: 92%       Laboratory Results:  I have personally reviewed all pertinent laboratory/tests results.    Psychiatric Review of Systems:  Behavior over the last 24 hours:  unchanged.   Sleep: good  Appetite: good  Medication side effects: none  ROS: no complaints, denies shortness of breath or chest pain and all other systems are negative for acute changes    Mental Status Evaluation:  Appearance:  disheveled, marginal hygiene,  overweight   Behavior:  bizarre, guarded, more cooperative   Speech:  slow   Mood:  anxious, still at times irritable   Affect:  blunted   Thought Process:  disorganized, illogical, perseverative, negative thinking   Thought Content:  paranoid, bizarre, and fixed delusions, paranoid ideation, negative thoughts, ruminating thoughts   Perceptual Disturbances: none   Risk Potential: Suicidal ideation - None  Homicidal ideation - None  Potential for aggression - No   Memory:  recent and remote memory grossly intact   Sensorium  person, place, and time/date      Consciousness:  alert and awake   Attention: attention span and concentration are age appropriate   Insight:  limited   Judgment: limited   Gait/Station: uses walker   Motor Activity: no abnormal movements   Progress Toward Goals:   Meli is progressing towards goals of inpatient psychiatric treatment by continued medication compliance and is attending therapeutic modalities on the milieu. However, the patient continues to require inpatient psychiatric hospitalization for continued medication management and titration to optimize symptom reduction, improve sleep hygiene, and demonstrate adequate self-care.    Assessment & Plan   Principal Problem:    Schizoaffective disorder, bipolar type (HCC)  Active Problems:    Medical clearance for psychiatric admission    Type 2 diabetes mellitus (HCC)    Obesity    Tobacco abuse    Hyperlipidemia    Esophageal reflux    Essential (primary) hypertension    Vitamin B12 deficiency    Hypoxia    Ambulatory dysfunction      Recommended Treatment: Treatment plan and medication changes discussed and per the attending physician the plan is:    1.Continue with group therapy, milieu therapy and occupational therapy  2.Behavioral Health checks every 7 minutes  3.Continue frequent safety checks and vitals per unit protocol  4.Continue with [facility] medical management as indicated  5.Continue with current medication regimen  6.Will review  labs in the a.m.  7.Disposition Planning: Discharge planning and efforts remain ongoing    Behavioral Health Medications: all current active meds have been reviewed and continue current psychiatric medications.  Current Facility-Administered Medications   Medication Dose Route Frequency Provider Last Rate    acetaminophen  650 mg Oral Q4H PRN Marie Ziegler, JOSE ELIAS      acetaminophen  650 mg Oral Q4H PRN Marie Ziegler, JOSE ELIAS      acetaminophen  975 mg Oral Q6H PRN JOSE ELIAS Figueroa      aluminum-magnesium hydroxide-simethicone  30 mL Oral Q4H PRN JOSE ELIAS Puri      atorvastatin  10 mg Oral Daily Marie Ziegler, JOSE ELIAS      bisacodyl  10 mg Rectal Daily PRN Marie Ziegler, JOSE ELIAS      carvedilol  6.25 mg Oral BID With Meals JOSE ELIAS Figueroa      cloNIDine  0.1 mg Transdermal Weekly JOSE ELIAS Figueroa      cloZAPine  350 mg Oral Daily Rehana Borrego PA-C      cyanocobalamin  1,000 mcg Oral Daily JOSE ELIAS Figueroa      famotidine  20 mg Oral BID Shan Fitzgerald DO      FLUoxetine  40 mg Oral Daily Dereje Banuelos MD      fluticasone  1 puff Inhalation Daily JOSE ELIAS Figueroa      hydrOXYzine HCL  25 mg Oral Q6H PRN Max 4/day Marie Ziegler, JOSE ELIAS      hydrOXYzine HCL  50 mg Oral Q6H PRN Max 4/day JOSE ELIAS Figueroa      ipratropium-albuterol  3 mL Nebulization Q4H PRN Darin Lawton MD      LORazepam  1 mg Intramuscular Q6H PRN Max 3/day JOSE ELIAS Figueroa      LORazepam  0.5 mg Oral BID Rehana Borrego PA-C      Or    LORazepam  1 mg Intramuscular BID Rehana Borrego PA-C      LORazepam  1 mg Oral Q6H PRN Max 3/day JOSE ELIAS Figueroa      melatonin  3 mg Oral HS JOSE ELIAS Figueroa      Multivitamin  15 mL Oral Daily JOSE ELIAS Figueroa      nicotine  1 patch Transdermal Daily JOSE ELIAS Figueroa      OLANZapine  2.5 mg Oral Q6H PRN Dereje Banuelos MD      Or    OLANZapine  2.5 mg Intramuscular Q6H PRN Dereje Banuelos MD      OLANZapine  5 mg Oral Q6H PRN  Dereje Banuelos MD      Or    OLANZapine  5 mg Intramuscular Q6H PRN Dereje Banuelos MD      ondansetron  4 mg Oral Q6H PRN Darin Lawton MD      polyethylene glycol  17 g Oral Daily PRN JOSE ELIAS Figueroa      polyethylene glycol  17 g Oral Daily JOSE ELIAS Ortega      senna-docusate sodium  2 tablet Oral HS Rehana Borrego PA-C      traZODone  50 mg Oral HS PRN JOSE ELIAS Figueroa         Risks / Benefits of Treatment:  Risks, benefits, and possible side effects of medications explained to patient and patient verbalizes understanding and agreement for treatment.    Counseling / Coordination of Care:  Patient's progress reviewed with nursing staff.  Medications, treatment progress and treatment plan reviewed with patient.  Supportive counseling provided to the patient.    Total floor/unit time spent today 25 minutes. Greater than 50% of total time was spent with the patient and / or family counseling and / or coordination of care. A description of the counseling / coordination of care: medication education, treatment plan, supportive therapy.    This note was completed in part utilizing Dragon dictation Software. Grammatical, translation, syntax errors, random word insertions, spelling mistakes, and incomplete sentences may be an occasional consequence of this system secondary to software limitations with voice recognition, ambient noise, and hardware issues. If you have any questions or concerns about the content, text, or information contained within the body of this dictation, please contact the provider for clarification

## 2024-08-19 NOTE — PROGRESS NOTES
08/19/24 0752   Team Meeting   Meeting Type Daily Rounds   Initial Conference Date 08/19/24   Next Conference Date 08/20/24   Team Members Present   Team Members Present Physician;Nurse;;   Physician Team Member Dr Banuelos, JAIR Romero   Nursing Team Member Clarissa   Care Management Team Member Anum   Social Work Team Member Ayse   Patient/Family Present   Patient Present No   Patient's Family Present No     D/C 1:1 this weekend, paranoid of the 1:1 so being off is beneficial for her, Clozaril levels today and may increase, discharge pending.

## 2024-08-19 NOTE — PLAN OF CARE
Problem: DISCHARGE PLANNING - CARE MANAGEMENT  Goal: Discharge to post-acute care or home with appropriate resources  Description: INTERVENTIONS:  - Conduct assessment to determine patient/family and health care team treatment goals, and need for post-acute services based on payer coverage, community resources, and patient preferences, and barriers to discharge  - Address psychosocial, clinical, and financial barriers to discharge as identified in assessment in conjunction with the patient/family and health care team  - Arrange appropriate level of post-acute services according to patient’s   needs and preference and payer coverage in collaboration with the physician and health care team  - Communicate with and update the patient/family, physician, and health care team regarding progress on the discharge plan  - Arrange appropriate transportation to post-acute venues  Outcome: Progressing     Problem: Prexisting or High Potential for Compromised Skin Integrity  Goal: Skin integrity is maintained or improved  Description: INTERVENTIONS:  - Identify patients at risk for skin breakdown  - Assess and monitor skin integrity  - Assess and monitor nutrition and hydration status  - Monitor labs   - Assess for incontinence   - Turn and reposition patient  - Assist with mobility/ambulation  - Relieve pressure over bony prominences  - Avoid friction and shearing  - Provide appropriate hygiene as needed including keeping skin clean and dry  - Evaluate need for skin moisturizer/barrier cream  - Collaborate with interdisciplinary team   - Patient/family teaching  - Consider wound care consult   Outcome: Progressing     Problem: Alteration in Thoughts and Perception  Goal: Treatment Goal: Gain control of psychotic behaviors/thinking, reduce/eliminate presenting symptoms and demonstrate improved reality functioning upon discharge  Outcome: Progressing  Goal: Verbalize thoughts and feelings  Description: Interventions:  - Promote  a nonjudgmental and trusting relationship with the patient through active listening and therapeutic communication  - Assess patient's level of functioning, behavior and potential for risk  - Engage patient in 1 on 1 interactions  - Encourage patient to express fears, feelings, frustrations, and discuss symptoms    - [place] patient to reality, help patient recognize reality-based thinking   - Administer medications as ordered and assess for potential side effects  - Provide the patient education related to the signs and symptoms of the illness and desired effects of prescribed medications  Outcome: Progressing  Goal: Refrain from acting on delusional thinking/internal stimuli  Description: Interventions:  - Monitor patient closely, per order   - Utilize least restrictive measures   - Set reasonable limits, give positive feedback for acceptable   - Administer medications as ordered and monitor of potential side effects  Outcome: Progressing  Goal: Agree to be compliant with medication regime, as prescribed and report medication side effects  Description: Interventions:  - Offer appropriate PRN medication and supervise ingestion; conduct AIMS, as needed   Outcome: Progressing  Goal: Attend and participate in unit activities, including therapeutic, recreational, and educational groups  Description: Interventions:  -Encourage Visitation and family involvement in care  Outcome: Progressing  Goal: Recognize dysfunctional thoughts, communicate reality-based thoughts at the time of discharge  Description: Interventions:  - Provide medication and psycho-education to assist patient in compliance and developing insight into his/her illness   Outcome: Progressing  Goal: Complete daily ADLs, including personal hygiene independently, as able  Description: Interventions:  - Observe, teach, and assist patient with ADLS  - Monitor and promote a balance of rest/activity, with adequate nutrition and elimination   Outcome: Progressing      Problem: Ineffective Coping  Goal: Identifies ineffective coping skills  Outcome: Progressing  Goal: Demonstrates healthy coping skills  Outcome: Progressing  Goal: Participates in unit activities  Description: Interventions:  - Provide therapeutic environment   - Provide required programming   - Redirect inappropriate behaviors   Outcome: Progressing  Goal: Patient/Family participate in treatment and DC plans  Description: Interventions:  - Provide therapeutic environment  Outcome: Progressing  Goal: Patient/Family verbalizes awareness of resources  Outcome: Progressing  Goal: Understands least restrictive measures  Description: Interventions:  - Utilize least restrictive behavior  Outcome: Progressing  Goal: Free from restraint events  Description: - Utilize least restrictive measures   - Provide behavioral interventions   - Redirect inappropriate behaviors   Outcome: Progressing     Problem: Risk for Self Injury/Neglect  Goal: Treatment Goal: Remain safe during length of stay, learn and adopt new coping skills, and be free of self-injurious ideation, impulses and acts at the time of discharge  Outcome: Progressing  Goal: Verbalize thoughts and feelings  Description: Interventions:  - Assess and re-assess patient's lethality and potential for self-injury  - Engage patient in 1:1 interactions, daily, for a minimum of 15 minutes  - Encourage patient to express feelings, fears, frustrations, hopes  - Establish rapport/trust with patient   Outcome: Progressing  Goal: Refrain from harming self  Description: Interventions:  - Monitor patient closely, per order  - Develop a trusting relationship  - Supervise medication ingestion, monitor effects and side effects   Outcome: Progressing  Goal: Attend and participate in unit activities, including therapeutic, recreational, and educational groups  Description: Interventions:  - Provide therapeutic and educational activities daily, encourage attendance and participation,  and document same in the medical record  - Obtain collateral information, encourage visitation and family involvement in care   Outcome: Progressing  Goal: Recognize maladaptive responses and adopt new coping mechanisms  Outcome: Progressing     Problem: Depression  Goal: Treatment Goal: Demonstrate behavioral control of depressive symptoms, verbalize feelings of improved mood/affect, and adopt new coping skills prior to discharge  Outcome: Progressing  Goal: Verbalize thoughts and feelings  Description: Interventions:  - Assess and re-assess patient's level of risk   - Engage patient in 1:1 interactions, daily, for a minimum of 15 minutes   - Encourage patient to express feelings, fears, frustrations, hopes   Outcome: Progressing  Goal: Refrain from isolation  Description: Interventions:  - Develop a trusting relationship   - Encourage socialization   Outcome: Progressing  Goal: Refrain from self-neglect  Outcome: Progressing  Goal: Attend and participate in unit activities, including therapeutic, recreational, and educational groups  Description: Interventions:  - Provide therapeutic and educational activities daily, encourage attendance and participation, and document same in the medical record   Outcome: Progressing     Problem: Anxiety  Goal: Anxiety is at manageable level  Description: Interventions:  - Assess and monitor patient's anxiety level.   - Monitor for signs and symptoms (heart palpitations, chest pain, shortness of breath, headaches, nausea, feeling jumpy, restlessness, irritable, apprehensive).   - Collaborate with interdisciplinary team and initiate plan and interventions as ordered.  - [place] patient to unit/surroundings  - Explain treatment plan  - Encourage participation in care  - Encourage verbalization of concerns/fears  - Identify coping mechanisms  - Assist in developing anxiety-reducing skills  - Administer/offer alternative therapies  - Limit or eliminate stimulants  Outcome:  Progressing     Problem: SAFETY,RESTRAINT: NV/NON-SELF DESTRUCTIVE BEHAVIOR  Goal: Remains free of harm/injury (restraint for non violent/non self-detsructive behavior)  Description: INTERVENTIONS:  - Instruct patient/family regarding restraint use   - Assess and monitor physiologic and psychological status   - Provide interventions and comfort measures to meet assessed patient needs   - Identify and implement measures to help patient regain control  - Assess readiness for release of restraint   Outcome: Progressing  Goal: Returns to optimal restraint-free functioning  Description: INTERVENTIONS:  - Assess the patient's behavior and symptoms that indicate continued need for restraint  - Identify and implement measures to help patient regain control  - Assess readiness for release of restraint   Outcome: Progressing     Problem: Potential for Falls  Goal: Patient will remain free of falls  Description: INTERVENTIONS:  - Educate patient on patient safety including physical limitations  - Instruct patient to call for assistance with activity   - Consult OT/PT to assist with strengthening/mobility   - Keep Call bell within reach  - Keep bed low and locked with side rails adjusted as appropriate  - Keep care items and personal belongings within reach  - Initiate and maintain comfort rounds  - Offer Toileting every 2 Hours, in advance of need  - Initiate/Maintain bed and chair alarm  - Obtain necessary fall risk management equipment: walker, wheelchair  - Apply yellow socks and bracelet for high fall risk patients  - Patient moved to room near nurses station  Outcome: Progressing     Problem: Nutrition/Hydration-ADULT  Goal: Nutrient/Hydration intake appropriate for improving, restoring or maintaining nutritional needs  Description: Monitor and assess patient's nutrition/hydration status for malnutrition. Collaborate with interdisciplinary team and initiate plan and interventions as ordered.  Monitor patient's weight and  dietary intake as ordered or per policy. Utilize nutrition screening tool and intervene as necessary. Determine patient's food preferences and provide high-protein, high-caloric foods as appropriate.     INTERVENTIONS:  - Monitor oral intake, urinary output, labs, and treatment plans  - Assess nutrition and hydration status and recommend course of action  - Evaluate amount of meals eaten  - Assist patient with eating if necessary   - Allow adequate time for meals  - Recommend/ encourage appropriate diets, oral nutritional supplements, and vitamin/mineral supplements  - Order, calculate, and assess calorie counts as needed  - Recommend, monitor, and adjust tube feedings and TPN/PPN based on assessed needs  - Assess need for intravenous fluids  - Provide specific nutrition/hydration education as appropriate  - Include patient/family/caregiver in decisions related to nutrition  Outcome: Progressing

## 2024-08-20 LAB
GLUCOSE SERPL-MCNC: 120 MG/DL (ref 65–140)
GLUCOSE SERPL-MCNC: 83 MG/DL (ref 65–140)

## 2024-08-20 PROCEDURE — 99232 SBSQ HOSP IP/OBS MODERATE 35: CPT | Performed by: STUDENT IN AN ORGANIZED HEALTH CARE EDUCATION/TRAINING PROGRAM

## 2024-08-20 PROCEDURE — 82948 REAGENT STRIP/BLOOD GLUCOSE: CPT

## 2024-08-20 RX ORDER — PRAZOSIN HYDROCHLORIDE 1 MG/1
2 CAPSULE ORAL DAILY
Status: DISCONTINUED | OUTPATIENT
Start: 2024-08-20 | End: 2024-08-20

## 2024-08-20 RX ORDER — LORAZEPAM 0.5 MG/1
0.5 TABLET ORAL DAILY
Status: DISCONTINUED | OUTPATIENT
Start: 2024-08-21 | End: 2024-08-21

## 2024-08-20 RX ORDER — PRAZOSIN HYDROCHLORIDE 1 MG/1
2 CAPSULE ORAL
Status: DISCONTINUED | OUTPATIENT
Start: 2024-08-20 | End: 2024-08-28

## 2024-08-20 RX ORDER — LORAZEPAM 2 MG/ML
1 INJECTION INTRAMUSCULAR DAILY
Status: DISCONTINUED | OUTPATIENT
Start: 2024-08-21 | End: 2024-08-21

## 2024-08-20 RX ADMIN — CLOZAPINE 400 MG: 200 TABLET ORAL at 08:34

## 2024-08-20 RX ADMIN — Medication 15 ML: at 08:56

## 2024-08-20 RX ADMIN — SENNOSIDES AND DOCUSATE SODIUM 2 TABLET: 8.6; 5 TABLET ORAL at 21:21

## 2024-08-20 RX ADMIN — PRAZOSIN HYDROCHLORIDE 2 MG: 1 CAPSULE ORAL at 21:21

## 2024-08-20 RX ADMIN — ATORVASTATIN CALCIUM 10 MG: 10 TABLET, FILM COATED ORAL at 08:34

## 2024-08-20 RX ADMIN — CYANOCOBALAMIN TAB 1000 MCG 1000 MCG: 1000 TAB at 08:34

## 2024-08-20 RX ADMIN — CARVEDILOL 6.25 MG: 6.25 TABLET, FILM COATED ORAL at 08:34

## 2024-08-20 RX ADMIN — FLUOXETINE 40 MG: 20 SOLUTION ORAL at 08:37

## 2024-08-20 RX ADMIN — FLUTICASONE FUROATE 1 PUFF: 100 POWDER RESPIRATORY (INHALATION) at 08:35

## 2024-08-20 RX ADMIN — LORAZEPAM 0.5 MG: 0.5 TABLET ORAL at 08:34

## 2024-08-20 RX ADMIN — FAMOTIDINE 20 MG: 20 TABLET ORAL at 08:34

## 2024-08-20 RX ADMIN — MELATONIN TAB 3 MG 3 MG: 3 TAB at 21:21

## 2024-08-20 RX ADMIN — LORAZEPAM 1 MG: 1 TABLET ORAL at 19:13

## 2024-08-20 RX ADMIN — FAMOTIDINE 20 MG: 20 TABLET ORAL at 17:14

## 2024-08-20 NOTE — NURSING NOTE
Patient is forgetful and disorganized. Withdrawn to bedroom and napping during day. BS 83. Appetite is adequate. Hygiene appears poor. Patient states she showered yesterday which staff confirmed. Patient had wet/soiled underwear and hospital gown draped over side rails. Encouraged to shower today but declined. Suspicious of medications. Mouth checks preformed. No delusions voiced. Fall precautions and continuous rounding maintained.

## 2024-08-20 NOTE — NURSING NOTE
"Refused first dose of Minipress. Patient states \"I can't take capsules. I'll choke.\" Provider notified.   "

## 2024-08-20 NOTE — NURSING NOTE
"Patient remains withdrawn. . Ate 25% of dinner. Appeared suspicious of medications. Denies psychiatric symptoms stating \"I'm just tired.\" No delusions verbalized. Currently resting in bed.   "
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
## 2024-08-20 NOTE — PLAN OF CARE
Problem: Alteration in Thoughts and Perception  Goal: Refrain from acting on delusional thinking/internal stimuli  Description: Interventions:  - Monitor patient closely, per order   - Utilize least restrictive measures   - Set reasonable limits, give positive feedback for acceptable   - Administer medications as ordered and monitor of potential side effects  Outcome: Progressing  Goal: Agree to be compliant with medication regime, as prescribed and report medication side effects  Description: Interventions:  - Offer appropriate PRN medication and supervise ingestion; conduct AIMS, as needed   Outcome: Progressing  Goal: Complete daily ADLs, including personal hygiene independently, as able  Description: Interventions:  - Observe, teach, and assist patient with ADLS  - Monitor and promote a balance of rest/activity, with adequate nutrition and elimination   Outcome: Progressing     Problem: Risk for Self Injury/Neglect  Goal: Treatment Goal: Remain safe during length of stay, learn and adopt new coping skills, and be free of self-injurious ideation, impulses and acts at the time of discharge  Outcome: Progressing     Problem: Anxiety  Goal: Anxiety is at manageable level  Description: Interventions:  - Assess and monitor patient's anxiety level.   - Monitor for signs and symptoms (heart palpitations, chest pain, shortness of breath, headaches, nausea, feeling jumpy, restlessness, irritable, apprehensive).   - Collaborate with interdisciplinary team and initiate plan and interventions as ordered.  - [place] patient to unit/surroundings  - Explain treatment plan  - Encourage participation in care  - Encourage verbalization of concerns/fears  - Identify coping mechanisms  - Assist in developing anxiety-reducing skills  - Administer/offer alternative therapies  - Limit or eliminate stimulants  Outcome: Progressing     Problem: Potential for Falls  Goal: Patient will remain free of falls  Description: INTERVENTIONS:  -  Educate patient on patient safety including physical limitations  - Instruct patient to call for assistance with activity   - Consult OT/PT to assist with strengthening/mobility   - Keep Call bell within reach  - Keep bed low and locked with side rails adjusted as appropriate  - Keep care items and personal belongings within reach  - Initiate and maintain comfort rounds  - Offer Toileting every 2 Hours, in advance of need  - Initiate/Maintain bed and chair alarm  - Obtain necessary fall risk management equipment: walker, wheelchair  - Apply yellow socks and bracelet for high fall risk patients  - Patient moved to room near nurses station  Outcome: Progressing     Problem: Nutrition/Hydration-ADULT  Goal: Nutrient/Hydration intake appropriate for improving, restoring or maintaining nutritional needs  Description: Monitor and assess patient's nutrition/hydration status for malnutrition. Collaborate with interdisciplinary team and initiate plan and interventions as ordered.  Monitor patient's weight and dietary intake as ordered or per policy. Utilize nutrition screening tool and intervene as necessary. Determine patient's food preferences and provide high-protein, high-caloric foods as appropriate.     INTERVENTIONS:  - Monitor oral intake, urinary output, labs, and treatment plans  - Assess nutrition and hydration status and recommend course of action  - Evaluate amount of meals eaten  - Assist patient with eating if necessary   - Allow adequate time for meals  - Recommend/ encourage appropriate diets, oral nutritional supplements, and vitamin/mineral supplements  - Order, calculate, and assess calorie counts as needed  - Recommend, monitor, and adjust tube feedings and TPN/PPN based on assessed needs  - Assess need for intravenous fluids  - Provide specific nutrition/hydration education as appropriate  - Include patient/family/caregiver in decisions related to nutrition  Outcome: Progressing

## 2024-08-20 NOTE — PLAN OF CARE
Problem: DISCHARGE PLANNING - CARE MANAGEMENT  Goal: Discharge to post-acute care or home with appropriate resources  Description: INTERVENTIONS:  - Conduct assessment to determine patient/family and health care team treatment goals, and need for post-acute services based on payer coverage, community resources, and patient preferences, and barriers to discharge  - Address psychosocial, clinical, and financial barriers to discharge as identified in assessment in conjunction with the patient/family and health care team  - Arrange appropriate level of post-acute services according to patient’s   needs and preference and payer coverage in collaboration with the physician and health care team  - Communicate with and update the patient/family, physician, and health care team regarding progress on the discharge plan  - Arrange appropriate transportation to post-acute venues  Outcome: Progressing     Problem: Prexisting or High Potential for Compromised Skin Integrity  Goal: Skin integrity is maintained or improved  Description: INTERVENTIONS:  - Identify patients at risk for skin breakdown  - Assess and monitor skin integrity  - Assess and monitor nutrition and hydration status  - Monitor labs   - Assess for incontinence   - Turn and reposition patient  - Assist with mobility/ambulation  - Relieve pressure over bony prominences  - Avoid friction and shearing  - Provide appropriate hygiene as needed including keeping skin clean and dry  - Evaluate need for skin moisturizer/barrier cream  - Collaborate with interdisciplinary team   - Patient/family teaching  - Consider wound care consult   Outcome: Progressing     Problem: Alteration in Thoughts and Perception  Goal: Treatment Goal: Gain control of psychotic behaviors/thinking, reduce/eliminate presenting symptoms and demonstrate improved reality functioning upon discharge  Outcome: Progressing  Goal: Verbalize thoughts and feelings  Description: Interventions:  - Promote  a nonjudgmental and trusting relationship with the patient through active listening and therapeutic communication  - Assess patient's level of functioning, behavior and potential for risk  - Engage patient in 1 on 1 interactions  - Encourage patient to express fears, feelings, frustrations, and discuss symptoms    - [place] patient to reality, help patient recognize reality-based thinking   - Administer medications as ordered and assess for potential side effects  - Provide the patient education related to the signs and symptoms of the illness and desired effects of prescribed medications  Outcome: Progressing  Goal: Refrain from acting on delusional thinking/internal stimuli  Description: Interventions:  - Monitor patient closely, per order   - Utilize least restrictive measures   - Set reasonable limits, give positive feedback for acceptable   - Administer medications as ordered and monitor of potential side effects  Outcome: Progressing  Goal: Agree to be compliant with medication regime, as prescribed and report medication side effects  Description: Interventions:  - Offer appropriate PRN medication and supervise ingestion; conduct AIMS, as needed   Outcome: Progressing  Goal: Attend and participate in unit activities, including therapeutic, recreational, and educational groups  Description: Interventions:  -Encourage Visitation and family involvement in care  Outcome: Progressing  Goal: Recognize dysfunctional thoughts, communicate reality-based thoughts at the time of discharge  Description: Interventions:  - Provide medication and psycho-education to assist patient in compliance and developing insight into his/her illness   Outcome: Progressing  Goal: Complete daily ADLs, including personal hygiene independently, as able  Description: Interventions:  - Observe, teach, and assist patient with ADLS  - Monitor and promote a balance of rest/activity, with adequate nutrition and elimination   Outcome: Progressing      Problem: Ineffective Coping  Goal: Identifies ineffective coping skills  Outcome: Progressing  Goal: Demonstrates healthy coping skills  Outcome: Progressing  Goal: Participates in unit activities  Description: Interventions:  - Provide therapeutic environment   - Provide required programming   - Redirect inappropriate behaviors   Outcome: Progressing  Goal: Patient/Family participate in treatment and DC plans  Description: Interventions:  - Provide therapeutic environment  Outcome: Progressing  Goal: Patient/Family verbalizes awareness of resources  Outcome: Progressing  Goal: Understands least restrictive measures  Description: Interventions:  - Utilize least restrictive behavior  Outcome: Progressing  Goal: Free from restraint events  Description: - Utilize least restrictive measures   - Provide behavioral interventions   - Redirect inappropriate behaviors   Outcome: Progressing     Problem: Risk for Self Injury/Neglect  Goal: Treatment Goal: Remain safe during length of stay, learn and adopt new coping skills, and be free of self-injurious ideation, impulses and acts at the time of discharge  Outcome: Progressing  Goal: Verbalize thoughts and feelings  Description: Interventions:  - Assess and re-assess patient's lethality and potential for self-injury  - Engage patient in 1:1 interactions, daily, for a minimum of 15 minutes  - Encourage patient to express feelings, fears, frustrations, hopes  - Establish rapport/trust with patient   Outcome: Progressing  Goal: Refrain from harming self  Description: Interventions:  - Monitor patient closely, per order  - Develop a trusting relationship  - Supervise medication ingestion, monitor effects and side effects   Outcome: Progressing  Goal: Attend and participate in unit activities, including therapeutic, recreational, and educational groups  Description: Interventions:  - Provide therapeutic and educational activities daily, encourage attendance and participation,  and document same in the medical record  - Obtain collateral information, encourage visitation and family involvement in care   Outcome: Progressing  Goal: Recognize maladaptive responses and adopt new coping mechanisms  Outcome: Progressing     Problem: Depression  Goal: Treatment Goal: Demonstrate behavioral control of depressive symptoms, verbalize feelings of improved mood/affect, and adopt new coping skills prior to discharge  Outcome: Progressing  Goal: Verbalize thoughts and feelings  Description: Interventions:  - Assess and re-assess patient's level of risk   - Engage patient in 1:1 interactions, daily, for a minimum of 15 minutes   - Encourage patient to express feelings, fears, frustrations, hopes   Outcome: Progressing  Goal: Refrain from isolation  Description: Interventions:  - Develop a trusting relationship   - Encourage socialization   Outcome: Progressing  Goal: Refrain from self-neglect  Outcome: Progressing  Goal: Attend and participate in unit activities, including therapeutic, recreational, and educational groups  Description: Interventions:  - Provide therapeutic and educational activities daily, encourage attendance and participation, and document same in the medical record   Outcome: Progressing     Problem: Anxiety  Goal: Anxiety is at manageable level  Description: Interventions:  - Assess and monitor patient's anxiety level.   - Monitor for signs and symptoms (heart palpitations, chest pain, shortness of breath, headaches, nausea, feeling jumpy, restlessness, irritable, apprehensive).   - Collaborate with interdisciplinary team and initiate plan and interventions as ordered.  - [place] patient to unit/surroundings  - Explain treatment plan  - Encourage participation in care  - Encourage verbalization of concerns/fears  - Identify coping mechanisms  - Assist in developing anxiety-reducing skills  - Administer/offer alternative therapies  - Limit or eliminate stimulants  Outcome:  Progressing     Problem: SAFETY,RESTRAINT: NV/NON-SELF DESTRUCTIVE BEHAVIOR  Goal: Remains free of harm/injury (restraint for non violent/non self-detsructive behavior)  Description: INTERVENTIONS:  - Instruct patient/family regarding restraint use   - Assess and monitor physiologic and psychological status   - Provide interventions and comfort measures to meet assessed patient needs   - Identify and implement measures to help patient regain control  - Assess readiness for release of restraint   Outcome: Progressing  Goal: Returns to optimal restraint-free functioning  Description: INTERVENTIONS:  - Assess the patient's behavior and symptoms that indicate continued need for restraint  - Identify and implement measures to help patient regain control  - Assess readiness for release of restraint   Outcome: Progressing     Problem: Potential for Falls  Goal: Patient will remain free of falls  Description: INTERVENTIONS:  - Educate patient on patient safety including physical limitations  - Instruct patient to call for assistance with activity   - Consult OT/PT to assist with strengthening/mobility   - Keep Call bell within reach  - Keep bed low and locked with side rails adjusted as appropriate  - Keep care items and personal belongings within reach  - Initiate and maintain comfort rounds  - Offer Toileting every 2 Hours, in advance of need  - Initiate/Maintain bed and chair alarm  - Obtain necessary fall risk management equipment: walker, wheelchair  - Apply yellow socks and bracelet for high fall risk patients  - Patient moved to room near nurses station  Outcome: Progressing     Problem: Nutrition/Hydration-ADULT  Goal: Nutrient/Hydration intake appropriate for improving, restoring or maintaining nutritional needs  Description: Monitor and assess patient's nutrition/hydration status for malnutrition. Collaborate with interdisciplinary team and initiate plan and interventions as ordered.  Monitor patient's weight and  dietary intake as ordered or per policy. Utilize nutrition screening tool and intervene as necessary. Determine patient's food preferences and provide high-protein, high-caloric foods as appropriate.     INTERVENTIONS:  - Monitor oral intake, urinary output, labs, and treatment plans  - Assess nutrition and hydration status and recommend course of action  - Evaluate amount of meals eaten  - Assist patient with eating if necessary   - Allow adequate time for meals  - Recommend/ encourage appropriate diets, oral nutritional supplements, and vitamin/mineral supplements  - Order, calculate, and assess calorie counts as needed  - Recommend, monitor, and adjust tube feedings and TPN/PPN based on assessed needs  - Assess need for intravenous fluids  - Provide specific nutrition/hydration education as appropriate  - Include patient/family/caregiver in decisions related to nutrition  Outcome: Progressing

## 2024-08-21 LAB
GLUCOSE SERPL-MCNC: 104 MG/DL (ref 65–140)
GLUCOSE SERPL-MCNC: 105 MG/DL (ref 65–140)

## 2024-08-21 PROCEDURE — 99232 SBSQ HOSP IP/OBS MODERATE 35: CPT | Performed by: STUDENT IN AN ORGANIZED HEALTH CARE EDUCATION/TRAINING PROGRAM

## 2024-08-21 PROCEDURE — 82948 REAGENT STRIP/BLOOD GLUCOSE: CPT

## 2024-08-21 RX ORDER — LORAZEPAM 0.5 MG/1
0.5 TABLET ORAL 2 TIMES DAILY
Status: COMPLETED | OUTPATIENT
Start: 2024-08-21 | End: 2024-08-21

## 2024-08-21 RX ORDER — LORAZEPAM 2 MG/ML
1 INJECTION INTRAMUSCULAR 2 TIMES DAILY
Status: COMPLETED | OUTPATIENT
Start: 2024-08-21 | End: 2024-08-21

## 2024-08-21 RX ORDER — CLONAZEPAM 0.5 MG/1
0.25 TABLET ORAL 2 TIMES DAILY
Status: DISCONTINUED | OUTPATIENT
Start: 2024-08-22 | End: 2024-08-22

## 2024-08-21 RX ORDER — LORAZEPAM 0.5 MG/1
0.5 TABLET ORAL 2 TIMES DAILY
Status: DISCONTINUED | OUTPATIENT
Start: 2024-08-21 | End: 2024-08-21

## 2024-08-21 RX ORDER — LORAZEPAM 2 MG/ML
1 INJECTION INTRAMUSCULAR 2 TIMES DAILY
Status: DISCONTINUED | OUTPATIENT
Start: 2024-08-21 | End: 2024-08-21

## 2024-08-21 RX ORDER — CLONAZEPAM 0.5 MG/1
0.5 TABLET ORAL DAILY
Status: DISCONTINUED | OUTPATIENT
Start: 2024-08-22 | End: 2024-08-21

## 2024-08-21 RX ADMIN — FAMOTIDINE 20 MG: 20 TABLET ORAL at 08:34

## 2024-08-21 RX ADMIN — LORAZEPAM 0.5 MG: 0.5 TABLET ORAL at 17:10

## 2024-08-21 RX ADMIN — Medication 15 ML: at 08:34

## 2024-08-21 RX ADMIN — FLUOXETINE 40 MG: 20 SOLUTION ORAL at 08:37

## 2024-08-21 RX ADMIN — POLYETHYLENE GLYCOL 3350 17 G: 17 POWDER, FOR SOLUTION ORAL at 08:36

## 2024-08-21 RX ADMIN — CARVEDILOL 6.25 MG: 6.25 TABLET, FILM COATED ORAL at 17:10

## 2024-08-21 RX ADMIN — ATORVASTATIN CALCIUM 10 MG: 10 TABLET, FILM COATED ORAL at 08:35

## 2024-08-21 RX ADMIN — CYANOCOBALAMIN TAB 1000 MCG 1000 MCG: 1000 TAB at 08:35

## 2024-08-21 RX ADMIN — CLOZAPINE 400 MG: 200 TABLET ORAL at 08:34

## 2024-08-21 RX ADMIN — FLUTICASONE FUROATE 1 PUFF: 100 POWDER RESPIRATORY (INHALATION) at 08:37

## 2024-08-21 RX ADMIN — CARVEDILOL 6.25 MG: 6.25 TABLET, FILM COATED ORAL at 08:35

## 2024-08-21 RX ADMIN — FAMOTIDINE 20 MG: 20 TABLET ORAL at 17:10

## 2024-08-21 RX ADMIN — LORAZEPAM 0.5 MG: 0.5 TABLET ORAL at 08:35

## 2024-08-21 NOTE — CASE MANAGEMENT
Cm spoke with pt's ICM worker Conchita Hall. Conchita reports plans to visit pt this afternoon. CM updated Conchita regarding pt's status. Conchita reports pt does not have any AH symptoms at baseline; pt is typically good with treatment compliance. Conchita reports pt would exprerience high anxiety an dpanic attacks related to past trauma for past couple of years. Conchita reports [facility]

## 2024-08-21 NOTE — PROGRESS NOTES
Progress Note - Behavioral Health   Meli Nowak 59 y.o. female MRN: [id number]  Unit/Bed#: [facility] 651-01 Encounter: [id number]    Patient was seen today for continuation of care, records reviewed and patient was discussed with the morning case review team.    Meli was seen today for psychiatric follow-up.  On assessment today, Meli was seen lying in bed.  More paranoid today, patient expresses that she was afraid to eat and made this writer promised that nothing was being added to her food or sprinkled on her pillow.  Reassurance provided.  No auditory or visual hallucinations endorsed upon direct questioning, patient does appear to be internally preoccupied at times.  As needed Ativan given overnight due to increased agitation and paranoia, we will change Ativan to Klonopin as patient has been more medication compliant with no implementation of medication over objection.  Clozaril to also be increased to 450 mg daily, Clozaril level obtained on 8/19/2024 at 559.  Weekly labs to continue q. Monday.  Tentative discharge ongoing as patient continues to undergo medication management.    Meli denies acute suicidal/self-harm ideation/intent/plan upon direct inquiry at this time.  Meli remains behaviorally appropriate, no agitation or aggression noted on exam or in report.  Impulse control is intact.  Meli remains adherent to her current psychotropic medication regimen and denies any side effects from medications, as well as none noted on exam.    Vitals:  Vitals:    08/21/24 0746   BP: 163/83   Pulse: 94   Resp: 16   Temp: (!) 97 °F (36.1 °C)   SpO2: 91%       Laboratory Results:  I have personally reviewed all pertinent laboratory/tests results.    Psychiatric Review of Systems:  Behavior over the last 24 hours:  unchanged.   Sleep: better  Appetite: good  Medication side effects: none  ROS: no complaints, denies shortness of breath or chest pain and all other systems are negative for acute changes    Mental Status  Evaluation:  Appearance:  disheveled, looks older than stated age, poor hygiene, overweight   Behavior:  bizarre, guarded   Speech:  slow   Mood:  anxious   Affect:  blunted   Thought Process:  disorganized, illogical, negative thinking, concrete   Thought Content:  paranoid ideation, negative thoughts, ruminating thoughts, paucity of thought   Perceptual Disturbances: appears preoccupied   Risk Potential: Suicidal ideation - None  Homicidal ideation - None  Potential for aggression - No   Memory:  recent and remote memory grossly intact   Sensorium  person and place      Consciousness:  alert and awake   Attention: decreased concentration and decreased attention span   Insight:  limited   Judgment: limited   Gait/Station: normal gait/station   Motor Activity: no abnormal movements   Progress Toward Goals:   Meli is progressing towards goals of inpatient psychiatric treatment by continued medication compliance and is attending therapeutic modalities on the milieu. However, the patient continues to require inpatient psychiatric hospitalization for continued medication management and titration to optimize symptom reduction, improve sleep hygiene, and demonstrate adequate self-care.    Assessment & Plan   Principal Problem:    Schizoaffective disorder, bipolar type (HCC)  Active Problems:    Medical clearance for psychiatric admission    Type 2 diabetes mellitus (HCC)    Obesity    Tobacco abuse    Hyperlipidemia    Esophageal reflux    Essential (primary) hypertension    Vitamin B12 deficiency    Hypoxia    Ambulatory dysfunction      Recommended Treatment: Treatment plan and medication changes discussed and per the attending physician the plan is:    1.Continue with group therapy, milieu therapy and occupational therapy  2.Behavioral Health checks every 7 minutes  3.Continue frequent safety checks and vitals per unit protocol  4.Continue with SLIM medical management as indicated  5.Continue with current medication  regimen  6.Will review labs in the a.m.  7.Disposition Planning: Discharge planning and efforts remain ongoing    Behavioral Health Medications: all current active meds have been reviewed and continue current psychiatric medications.  Current Facility-Administered Medications   Medication Dose Route Frequency Provider Last Rate    acetaminophen  650 mg Oral Q4H PRN JOSE ELIAS Figueroa      acetaminophen  650 mg Oral Q4H PRN Marie Ziegler, JOSE ELIAS      acetaminophen  975 mg Oral Q6H PRN JOSE ELIAS Figueroa      aluminum-magnesium hydroxide-simethicone  30 mL Oral Q4H PRN JOSE ELIAS Puri      atorvastatin  10 mg Oral Daily Marie Ziegler, JOSE ELIAS      bisacodyl  10 mg Rectal Daily PRN JOSE ELIAS Figueroa      carvedilol  6.25 mg Oral BID With Meals JOSE ELIAS Figueroa      [START ON 8/22/2024] clonazePAM  0.5 mg Oral Daily JOSE ELIAS Figueroa      cloNIDine  0.1 mg Transdermal Weekly JOSE ELIAS Figueroa      [START ON 8/22/2024] cloZAPine  450 mg Oral Daily JOSE ELIAS Figueroa      cyanocobalamin  1,000 mcg Oral Daily JOSE ELIAS Figueroa      famotidine  20 mg Oral BID Shan Fitzgerald DO      FLUoxetine  40 mg Oral Daily Dereje Banuelos MD      fluticasone  1 puff Inhalation Daily JOSE ELIAS Figueroa      hydrOXYzine HCL  25 mg Oral Q6H PRN Max 4/day JOSE ELIAS Figueroa      hydrOXYzine HCL  50 mg Oral Q6H PRN Max 4/day JOSE ELIAS Figueroa      ipratropium-albuterol  3 mL Nebulization Q4H PRN Darin Lawton MD      LORazepam  1 mg Intramuscular Q6H PRN Max 3/day JOSE ELIAS Figueroa      LORazepam  0.5 mg Oral BID JOSE ELIAS Figueroa      Or    LORazepam  1 mg Intramuscular BID Marie Ziegler, JOSE ELIAS      LORazepam  1 mg Oral Q6H PRN Max 3/day JOSE ELIAS Figueroa      melatonin  3 mg Oral HS JOSE ELIAS Figueroa      Multivitamin  15 mL Oral Daily JOSE ELIAS Figueroa      nicotine  1 patch Transdermal Daily JOSE ELIAS Figueroa      OLANZapine  2.5 mg Oral Q6H PRN Dereje Banuelos  MD      Or    OLANZapine  2.5 mg Intramuscular Q6H PRN Dereje Banuelos MD      OLANZapine  5 mg Oral Q6H PRN Dereje Banuelos MD      Or    OLANZapine  5 mg Intramuscular Q6H PRN Dereje Banuelos MD      ondansetron  4 mg Oral Q6H PRN Darin Lawton MD      polyethylene glycol  17 g Oral Daily PRN JOSE ELIAS Figueroa      polyethylene glycol  17 g Oral Daily JOSE ELIAS Ortega      prazosin  2 mg Oral HS JOSE ELIAS Figueroa      senna-docusate sodium  2 tablet Oral HS Rehana Borrego PA-C      traZODone  50 mg Oral HS PRN JOSE ELIAS Figueroa         Risks / Benefits of Treatment:  Risks, benefits, and possible side effects of medications explained to patient and patient verbalizes understanding and agreement for treatment.    Counseling / Coordination of Care:  Patient's progress reviewed with nursing staff.  Medications, treatment progress and treatment plan reviewed with patient.  Supportive counseling provided to the patient.    Total floor/unit time spent today 25 minutes. Greater than 50% of total time was spent with the patient and / or family counseling and / or coordination of care. A description of the counseling / coordination of care: medication education, treatment plan, supportive therapy.    This note was completed in part utilizing Dragon dictation Software. Grammatical, translation, syntax errors, random word insertions, spelling mistakes, and incomplete sentences may be an occasional consequence of this system secondary to software limitations with voice recognition, ambient noise, and hardware issues. If you have any questions or concerns about the content, text, or information contained within the body of this dictation, please contact the provider for clarification

## 2024-08-21 NOTE — NURSING NOTE
Meli is isolative to her room but interacts with staff on contact. Meli is medication compliant and  with mouth checks. Meli does express some delusions but reports that her hallucinations are improved. Resting at this time w/o distress. Will continue to monitor and support during treatment.

## 2024-08-21 NOTE — NURSING NOTE
Pt blunted and withdrawn but med-compliant with fair appetite.  VSS.   at 0800.  No RIS noted.  Monitored for safety and support.

## 2024-08-21 NOTE — PROGRESS NOTES
08/21/24 0753   Team Meeting   Meeting Type Daily Rounds   Initial Conference Date 08/21/24   Next Conference Date 08/22/24   Team Members Present   Team Members Present Physician;Nurse;;   Physician Team Member Dr Banuelos, JAIR Romero   Nursing Team Member Clarissa   Care Management Team Member Anum   Social Work Team Member Ayse   Patient/Family Present   Patient Present No   Patient's Family Present No     Isolating to self, mouth checks, AH, responding internally, redirectable, PRN ativan 1933 effective, no groups, Clozaril increase to 450, team wants her OP CM to come in to assess her baseline, discharge pending.

## 2024-08-21 NOTE — TREATMENT TEAM
"   08/20/24 1910   Powell Anxiety Scale   Anxious Mood 4   Tension 4   Fears 4   Insomnia 0   Intellectual 3   Depressed Mood 2   Somatic Complaints: Muscular 2   Somatic Complaints: Sensory 2   Cardiovascular Symptoms 0   Respiratory Symptoms 0   Gastrointestinal Symptoms 0   Genitourinary Symptoms 0   Autonomic Symptoms 3   Behavior at Interview 4   Powell Anxiety Score 28     Patient requested Ativan for c/o anxiety. Patient reports that \"I need to come off the ceiling.\" Patient reports feeling unwell. Emotional support also provided.  "

## 2024-08-21 NOTE — CASE MANAGEMENT
Conchita Hall, pt's ICM worker visited pt on unit. Conchita reports pt is improved. Pt was internally preoccupied while speaking with Conchita. Conchita reports pt has meals on wheels and a Rep Payee at [facility]. Conchita suggested pt to be discharged with blister pack meds from [facility] pharmacy; they also deliver. Conchita reports concern that pt is isolative on unit and sleeps during day as pt may do same at d/c. Conchita also reports speaking with pt's unit RN regarding pt's upper dentures missing. CM notified unit clinical coordinator Clarissa of missing dentures.

## 2024-08-21 NOTE — NURSING NOTE
Pt flat, guarded, able to make needs known, endorses mild anxiety and depression does not request intervention, denies AH/VH, SI/HI. Does not attend groups, poor appetite, mild confusion noted. Will continue to monitor

## 2024-08-21 NOTE — PLAN OF CARE
Problem: Prexisting or High Potential for Compromised Skin Integrity  Goal: Skin integrity is maintained or improved  Description: INTERVENTIONS:  - Identify patients at risk for skin breakdown  - Assess and monitor skin integrity  - Assess and monitor nutrition and hydration status  - Monitor labs   - Assess for incontinence   - Turn and reposition patient  - Assist with mobility/ambulation  - Relieve pressure over bony prominences  - Avoid friction and shearing  - Provide appropriate hygiene as needed including keeping skin clean and dry  - Evaluate need for skin moisturizer/barrier cream  - Collaborate with interdisciplinary team   - Patient/family teaching  - Consider wound care consult   Outcome: Progressing     Problem: Alteration in Thoughts and Perception  Goal: Verbalize thoughts and feelings  Description: Interventions:  - Promote a nonjudgmental and trusting relationship with the patient through active listening and therapeutic communication  - Assess patient's level of functioning, behavior and potential for risk  - Engage patient in 1 on 1 interactions  - Encourage patient to express fears, feelings, frustrations, and discuss symptoms    - [place] patient to reality, help patient recognize reality-based thinking   - Administer medications as ordered and assess for potential side effects  - Provide the patient education related to the signs and symptoms of the illness and desired effects of prescribed medications  Outcome: Progressing  Goal: Agree to be compliant with medication regime, as prescribed and report medication side effects  Description: Interventions:  - Offer appropriate PRN medication and supervise ingestion; conduct AIMS, as needed   Outcome: Progressing     Problem: Ineffective Coping  Goal: Patient/Family participate in treatment and DC plans  Description: Interventions:  - Provide therapeutic environment  Outcome: Not Progressing  Goal: Understands least restrictive  measures  Description: Interventions:  - Utilize least restrictive behavior  Outcome: Progressing  Goal: Free from restraint events  Description: - Utilize least restrictive measures   - Provide behavioral interventions   - Redirect inappropriate behaviors   Outcome: Progressing     Problem: Risk for Self Injury/Neglect  Goal: Refrain from harming self  Description: Interventions:  - Monitor patient closely, per order  - Develop a trusting relationship  - Supervise medication ingestion, monitor effects and side effects   Outcome: Progressing     Problem: Depression  Goal: Refrain from isolation  Description: Interventions:  - Develop a trusting relationship   - Encourage socialization   Outcome: Not Progressing     Problem: Anxiety  Goal: Anxiety is at manageable level  Description: Interventions:  - Assess and monitor patient's anxiety level.   - Monitor for signs and symptoms (heart palpitations, chest pain, shortness of breath, headaches, nausea, feeling jumpy, restlessness, irritable, apprehensive).   - Collaborate with interdisciplinary team and initiate plan and interventions as ordered.  - [place] patient to unit/surroundings  - Explain treatment plan  - Encourage participation in care  - Encourage verbalization of concerns/fears  - Identify coping mechanisms  - Assist in developing anxiety-reducing skills  - Administer/offer alternative therapies  - Limit or eliminate stimulants  Outcome: Not Progressing     Problem: Potential for Falls  Goal: Patient will remain free of falls  Description: INTERVENTIONS:  - Educate patient on patient safety including physical limitations  - Instruct patient to call for assistance with activity   - Consult OT/PT to assist with strengthening/mobility   - Keep Call bell within reach  - Keep bed low and locked with side rails adjusted as appropriate  - Keep care items and personal belongings within reach  - Initiate and maintain comfort rounds  - Offer Toileting every 2 Hours,  in advance of need  - Initiate/Maintain bed and chair alarm  - Obtain necessary fall risk management equipment: walker, wheelchair  - Apply yellow socks and bracelet for high fall risk patients  - Patient moved to room near nurses station  Outcome: Progressing     Problem: Nutrition/Hydration-ADULT  Goal: Nutrient/Hydration intake appropriate for improving, restoring or maintaining nutritional needs  Description: Monitor and assess patient's nutrition/hydration status for malnutrition. Collaborate with interdisciplinary team and initiate plan and interventions as ordered.  Monitor patient's weight and dietary intake as ordered or per policy. Utilize nutrition screening tool and intervene as necessary. Determine patient's food preferences and provide high-protein, high-caloric foods as appropriate.     INTERVENTIONS:  - Monitor oral intake, urinary output, labs, and treatment plans  - Assess nutrition and hydration status and recommend course of action  - Evaluate amount of meals eaten  - Assist patient with eating if necessary   - Allow adequate time for meals  - Recommend/ encourage appropriate diets, oral nutritional supplements, and vitamin/mineral supplements  - Order, calculate, and assess calorie counts as needed  - Recommend, monitor, and adjust tube feedings and TPN/PPN based on assessed needs  - Assess need for intravenous fluids  - Provide specific nutrition/hydration education as appropriate  - Include patient/family/caregiver in decisions related to nutrition  Outcome: Progressing

## 2024-08-22 PROBLEM — E44.1 MILD PROTEIN-CALORIE MALNUTRITION (HCC): Status: ACTIVE | Noted: 2024-08-22

## 2024-08-22 LAB
GLUCOSE SERPL-MCNC: 110 MG/DL (ref 65–140)
GLUCOSE SERPL-MCNC: 133 MG/DL (ref 65–140)

## 2024-08-22 PROCEDURE — 99232 SBSQ HOSP IP/OBS MODERATE 35: CPT | Performed by: STUDENT IN AN ORGANIZED HEALTH CARE EDUCATION/TRAINING PROGRAM

## 2024-08-22 PROCEDURE — 82948 REAGENT STRIP/BLOOD GLUCOSE: CPT

## 2024-08-22 PROCEDURE — 97116 GAIT TRAINING THERAPY: CPT

## 2024-08-22 RX ORDER — LORAZEPAM 2 MG/ML
0.5 INJECTION INTRAMUSCULAR 2 TIMES DAILY
Status: DISCONTINUED | OUTPATIENT
Start: 2024-08-22 | End: 2024-11-14

## 2024-08-22 RX ORDER — LORAZEPAM 0.5 MG/1
0.5 TABLET ORAL 2 TIMES DAILY
Status: DISCONTINUED | OUTPATIENT
Start: 2024-08-22 | End: 2024-11-14

## 2024-08-22 RX ORDER — CLONAZEPAM 0.5 MG/1
0.25 TABLET ORAL 2 TIMES DAILY
Status: DISCONTINUED | OUTPATIENT
Start: 2024-08-22 | End: 2024-08-22

## 2024-08-22 RX ORDER — WATER 10 ML/10ML
INJECTION INTRAMUSCULAR; INTRAVENOUS; SUBCUTANEOUS
Status: COMPLETED
Start: 2024-08-22 | End: 2024-08-22

## 2024-08-22 RX ORDER — LORAZEPAM 2 MG/ML
0.5 INJECTION INTRAMUSCULAR 2 TIMES DAILY
Status: DISCONTINUED | OUTPATIENT
Start: 2024-08-22 | End: 2024-08-22

## 2024-08-22 RX ADMIN — CYANOCOBALAMIN TAB 1000 MCG 1000 MCG: 1000 TAB at 08:34

## 2024-08-22 RX ADMIN — CLONAZEPAM 0.25 MG: 0.5 TABLET ORAL at 08:34

## 2024-08-22 RX ADMIN — LORAZEPAM 1 MG: 2 INJECTION INTRAMUSCULAR; INTRAVENOUS at 10:05

## 2024-08-22 RX ADMIN — FLUOXETINE 40 MG: 20 SOLUTION ORAL at 08:36

## 2024-08-22 RX ADMIN — Medication 15 ML: at 08:50

## 2024-08-22 RX ADMIN — SENNOSIDES AND DOCUSATE SODIUM 2 TABLET: 8.6; 5 TABLET ORAL at 21:29

## 2024-08-22 RX ADMIN — CARVEDILOL 6.25 MG: 6.25 TABLET, FILM COATED ORAL at 16:01

## 2024-08-22 RX ADMIN — FAMOTIDINE 20 MG: 20 TABLET ORAL at 08:34

## 2024-08-22 RX ADMIN — ATORVASTATIN CALCIUM 10 MG: 10 TABLET, FILM COATED ORAL at 08:34

## 2024-08-22 RX ADMIN — FAMOTIDINE 20 MG: 20 TABLET ORAL at 17:30

## 2024-08-22 RX ADMIN — OLANZAPINE 5 MG: 10 INJECTION, POWDER, FOR SOLUTION INTRAMUSCULAR at 11:08

## 2024-08-22 RX ADMIN — LORAZEPAM 0.5 MG: 0.5 TABLET ORAL at 17:30

## 2024-08-22 RX ADMIN — POLYETHYLENE GLYCOL 3350 17 G: 17 POWDER, FOR SOLUTION ORAL at 08:50

## 2024-08-22 RX ADMIN — WATER 10 ML: 1 INJECTION, SOLUTION INTRAMUSCULAR; INTRAVENOUS; SUBCUTANEOUS at 11:08

## 2024-08-22 RX ADMIN — MELATONIN TAB 3 MG 3 MG: 3 TAB at 21:29

## 2024-08-22 RX ADMIN — ATORVASTATIN CALCIUM 10 MG: 10 TABLET, FILM COATED ORAL at 13:15

## 2024-08-22 RX ADMIN — CLOZAPINE 450 MG: 25 TABLET ORAL at 08:34

## 2024-08-22 RX ADMIN — FLUTICASONE FUROATE 1 PUFF: 100 POWDER RESPIRATORY (INHALATION) at 08:36

## 2024-08-22 NOTE — PROGRESS NOTES
08/22/24 0813   Team Meeting   Meeting Type Daily Rounds   Initial Conference Date 08/22/24   Next Conference Date 08/23/24   Team Members Present   Team Members Present Physician;Nurse;;   Physician Team Member Dr Banuelos, JAIR Romero   Nursing Team Member Clarissa   Care Management Team Member Anum   Social Work Team Member Ayse   Patient/Family Present   Patient Present No   Patient's Family Present No     Add pharmacy - [facility]: refused [facility]ipress last night, attending groups, paranoid yesterday regarding her food, orthostatics done, 500 of clozaril.

## 2024-08-22 NOTE — PROGRESS NOTES
Pt visible in dayroom by self.  Pt unable to complete assessment at this time.  Pt calm and able to begin building therapeutic rapport.  Reviewed group schedule for tomorrow and pt did express interest but asked if she had to attend all groups.  Pt commented that she has naturally curly hair that she is proud of.      08/22/24 1430   Activity/Group Checklist   Group Admission/Discharge   Attendance Other (Comment)  (pt visible in dayroom, building a rapport)

## 2024-08-22 NOTE — NURSING NOTE
Pt refused evening meds regardless of this writers attempts to convince patient of the importance them and providing reasons for each medication. Explained to patient that if the pattern continues her medications may be changed to IM. Pt verbalized understanding and continued refusal.

## 2024-08-22 NOTE — PROGRESS NOTES
"Progress Note - Behavioral Health   Meli Nowak 59 y.o. female MRN: [id number]  Unit/Bed#: [facility] 651-01 Encounter: [id number]    Patient was seen today for continuation of care, records reviewed and patient was discussed with the morning case review team.    Meli was seen today for psychiatric follow-up.  On assessment today, Meli was paranoid and guarded.  Using morning medications at this time, patient unable to be redirected despite attempts by multiple staff members.  Zyprexa 5 mg IM given in addition to Ativan 1 mg to assist with symptoms, patient remained very opposed.  Stating \" God told me not to take them after I , I  last night\".  Directly questioned by this writer as well as attending physician who is at the bedside, patient does acknowledge that she is alive but is firm in her stance that she must not take medications.  Meds over objection ongoing, Clozaril also to be increased to 500 mg daily with level to be obtained on 2024.  Tentative discharge ongoing as patient continues to be paranoid with Zoroastrian delusions and intermittent medication noncompliance.    Meli denies acute suicidal/self-harm ideation/intent/plan upon direct inquiry at this time.  Impulse control is intact.  Meli intermittently adherent to her current psychotropic medication regimen and denies any side effects from medications, as well as none noted on exam.    Vitals:  Vitals:    24 0754   BP: (!) 173/81   Pulse: 100   Resp: 20   Temp: 98.1 °F (36.7 °C)   SpO2: 92%       Laboratory Results:  I have personally reviewed all pertinent laboratory/tests results.    Psychiatric Review of Systems:  Behavior over the last 24 hours:  unchanged.   Sleep: interrupted  Appetite: good  Medication side effects: none  ROS: no complaints, denies shortness of breath or chest pain and all other systems are negative for acute changes    Mental Status Evaluation:  Appearance:  disheveled, marginal hygiene   Behavior:  bizarre, " guarded   Speech:  slow   Mood:  depressed, anxious, irritable   Affect:  blunted   Thought Process:  disorganized, illogical, perseverative, impaired abstract reasoning, concrete   Thought Content:  Jainism preoccupation, paranoid ideation, negative thoughts, ruminating thoughts   Perceptual Disturbances: appears responding to internal stimuli, appears preoccupied, talks to self at times   Risk Potential: Suicidal ideation - None  Homicidal ideation - None  Potential for aggression - No   Memory:  recent and remote memory: unable to assess due to lack of cooperation   Sensorium  person and place      Consciousness:  alert and awake   Attention: attention span and concentration are age appropriate   Insight:  limited   Judgment: limited   Gait/Station: slow gait   Motor Activity: no abnormal movements   Progress Toward Goals:   Meli is progressing towards goals of inpatient psychiatric treatment by continued medication compliance and is attending therapeutic modalities on the milieu. However, the patient continues to require inpatient psychiatric hospitalization for continued medication management and titration to optimize symptom reduction, improve sleep hygiene, and demonstrate adequate self-care.    Assessment & Plan   Principal Problem:    Schizoaffective disorder, bipolar type (HCC)  Active Problems:    Medical clearance for psychiatric admission    Type 2 diabetes mellitus (HCC)    Obesity    Tobacco abuse    Hyperlipidemia    Esophageal reflux    Essential (primary) hypertension    Vitamin B12 deficiency    Hypoxia    Ambulatory dysfunction    Mild protein-calorie malnutrition (HCC)      Recommended Treatment: Treatment plan and medication changes discussed and per the attending physician the plan is:    1.Continue with group therapy, milieu therapy and occupational therapy  2.Behavioral Health checks every 7 minutes  3.Continue frequent safety checks and vitals per unit protocol  4.Continue with SLIM  medical management as indicated  5.Continue with current medication regimen  6.Will review labs in the a.m.  7.Disposition Planning: Discharge planning and efforts remain ongoing    Behavioral Health Medications: all current active meds have been reviewed and continue current psychiatric medications.  Current Facility-Administered Medications   Medication Dose Route Frequency Provider Last Rate    acetaminophen  650 mg Oral Q4H PRN Marie Ziegler, JOSE ELIAS      acetaminophen  650 mg Oral Q4H PRN Marie Ziegler, JOSE ELIAS      acetaminophen  975 mg Oral Q6H PRN JOSE ELIAS Figueroa      aluminum-magnesium hydroxide-simethicone  30 mL Oral Q4H PRN Parris Bolanos, JOSE ELIAS      atorvastatin  10 mg Oral Daily Marie Ziegler, JOSE ELIAS      bisacodyl  10 mg Rectal Daily PRN Marie Ziegler, JOSE ELIAS      carvedilol  6.25 mg Oral BID With Meals Marie Ziegler, JOSE ELIAS      clonazePAM  0.25 mg Oral BID Dereje Banuelos MD      Or    LORazepam  0.5 mg Intramuscular BID Dereje Banuelos MD      cloNIDine  0.1 mg Transdermal Weekly Marie Ziegler, JOSE ELIAS      cloZAPine  450 mg Oral Daily Marie Ziegler, JOSE ELIAS      cyanocobalamin  1,000 mcg Oral Daily Marie Ziegler, JOSE ELIAS      famotidine  20 mg Oral BID Shan Fitzgerald,       FLUoxetine  40 mg Oral Daily Dereje Banuelos MD      fluticasone  1 puff Inhalation Daily JOSE ELIAS Figueroa      hydrOXYzine HCL  25 mg Oral Q6H PRN Max 4/day Marie Ziegler, JOSE ELIAS      hydrOXYzine HCL  50 mg Oral Q6H PRN Max 4/day JOSE ELIAS Figueroa      ipratropium-albuterol  3 mL Nebulization Q4H PRN Darin Lawton MD      LORazepam  1 mg Intramuscular Q6H PRN Max 3/day JOSE ELIAS Figueroa      LORazepam  1 mg Oral Q6H PRN Max 3/day Marie Ziegler, JOSE ELIAS      melatonin  3 mg Oral HS Marie Ziegler, JOSE ELIAS      Multivitamin  15 mL Oral Daily Marie Ziegler, JOSE ELIAS      nicotine  1 patch Transdermal Daily JOSE ELIAS Figueroa      OLANZapine  2.5 mg Oral Q6H PRN Dereje Banuelos MD      Or    OLANZapine  2.5 mg  Intramuscular Q6H PRN Dereje Banuelos MD      OLANZapine  5 mg Oral Q6H PRN Dereje Banuelos MD      Or    OLANZapine  5 mg Intramuscular Q6H PRN Dereje Banuelos MD      ondansetron  4 mg Oral Q6H PRN Darin Lawton MD      polyethylene glycol  17 g Oral Daily PRN JOSE ELIAS Figueroa      polyethylene glycol  17 g Oral Daily JOSE ELIAS Ortega      prazosin  2 mg Oral HS JOSE ELIAS Figueroa      senna-docusate sodium  2 tablet Oral HS Rehana Borrego PA-C      sterile water           traZODone  50 mg Oral HS PRN JOSE ELIAS Figueroa         Risks / Benefits of Treatment:  Risks, benefits, and possible side effects of medications explained to patient and patient verbalizes understanding and agreement for treatment.    Counseling / Coordination of Care:  Patient's progress reviewed with nursing staff.  Medications, treatment progress and treatment plan reviewed with patient.  Supportive counseling provided to the patient.    Total floor/unit time spent today 25 minutes. Greater than 50% of total time was spent with the patient and / or family counseling and / or coordination of care. A description of the counseling / coordination of care: medication education, treatment plan, supportive therapy.    This note was completed in part utilizing Dragon dictation Software. Grammatical, translation, syntax errors, random word insertions, spelling mistakes, and incomplete sentences may be an occasional consequence of this system secondary to software limitations with voice recognition, ambient noise, and hardware issues. If you have any questions or concerns about the content, text, or information contained within the body of this dictation, please contact the provider for clarification

## 2024-08-22 NOTE — NURSING NOTE
Pt is paranoid and guarded, pt refusing all am medications despite attempts by multiple staff members to redirect, Zyprexa 5 mg IM in addition to Ativan 1 mg IM given as ordered,   , denies SI and HI, pt continues paranoia with Alevism delusions that God told her not to take medication.

## 2024-08-22 NOTE — PLAN OF CARE
Problem: Prexisting or High Potential for Compromised Skin Integrity  Goal: Skin integrity is maintained or improved  Description: INTERVENTIONS:  - Identify patients at risk for skin breakdown  - Assess and monitor skin integrity  - Assess and monitor nutrition and hydration status  - Monitor labs   - Assess for incontinence   - Turn and reposition patient  - Assist with mobility/ambulation  - Relieve pressure over bony prominences  - Avoid friction and shearing  - Provide appropriate hygiene as needed including keeping skin clean and dry  - Evaluate need for skin moisturizer/barrier cream  - Collaborate with interdisciplinary team   - Patient/family teaching  - Consider wound care consult   Outcome: Progressing     Problem: Alteration in Thoughts and Perception  Goal: Verbalize thoughts and feelings  Description: Interventions:  - Promote a nonjudgmental and trusting relationship with the patient through active listening and therapeutic communication  - Assess patient's level of functioning, behavior and potential for risk  - Engage patient in 1 on 1 interactions  - Encourage patient to express fears, feelings, frustrations, and discuss symptoms    - [place] patient to reality, help patient recognize reality-based thinking   - Administer medications as ordered and assess for potential side effects  - Provide the patient education related to the signs and symptoms of the illness and desired effects of prescribed medications  Outcome: Progressing  Goal: Refrain from acting on delusional thinking/internal stimuli  Description: Interventions:  - Monitor patient closely, per order   - Utilize least restrictive measures   - Set reasonable limits, give positive feedback for acceptable   - Administer medications as ordered and monitor of potential side effects  Outcome: Progressing     Problem: Ineffective Coping  Goal: Participates in unit activities  Description: Interventions:  - Provide therapeutic environment   -  Provide required programming   - Redirect inappropriate behaviors   Outcome: Not Progressing  Goal: Free from restraint events  Description: - Utilize least restrictive measures   - Provide behavioral interventions   - Redirect inappropriate behaviors   Outcome: Progressing     Problem: Risk for Self Injury/Neglect  Goal: Refrain from harming self  Description: Interventions:  - Monitor patient closely, per order  - Develop a trusting relationship  - Supervise medication ingestion, monitor effects and side effects   Outcome: Progressing     Problem: Depression  Goal: Treatment Goal: Demonstrate behavioral control of depressive symptoms, verbalize feelings of improved mood/affect, and adopt new coping skills prior to discharge  Outcome: Progressing  Goal: Refrain from isolation  Description: Interventions:  - Develop a trusting relationship   - Encourage socialization   Outcome: Not Progressing     Problem: Anxiety  Goal: Anxiety is at manageable level  Description: Interventions:  - Assess and monitor patient's anxiety level.   - Monitor for signs and symptoms (heart palpitations, chest pain, shortness of breath, headaches, nausea, feeling jumpy, restlessness, irritable, apprehensive).   - Collaborate with interdisciplinary team and initiate plan and interventions as ordered.  - [place] patient to unit/surroundings  - Explain treatment plan  - Encourage participation in care  - Encourage verbalization of concerns/fears  - Identify coping mechanisms  - Assist in developing anxiety-reducing skills  - Administer/offer alternative therapies  - Limit or eliminate stimulants  Outcome: Progressing     Problem: SAFETY,RESTRAINT: NV/NON-SELF DESTRUCTIVE BEHAVIOR  Goal: Returns to optimal restraint-free functioning  Description: INTERVENTIONS:  - Assess the patient's behavior and symptoms that indicate continued need for restraint  - Identify and implement measures to help patient regain control  - Assess readiness for release  of restraint   Outcome: Progressing     Problem: Nutrition/Hydration-ADULT  Goal: Nutrient/Hydration intake appropriate for improving, restoring or maintaining nutritional needs  Description: Monitor and assess patient's nutrition/hydration status for malnutrition. Collaborate with interdisciplinary team and initiate plan and interventions as ordered.  Monitor patient's weight and dietary intake as ordered or per policy. Utilize nutrition screening tool and intervene as necessary. Determine patient's food preferences and provide high-protein, high-caloric foods as appropriate.     INTERVENTIONS:  - Monitor oral intake, urinary output, labs, and treatment plans  - Assess nutrition and hydration status and recommend course of action  - Evaluate amount of meals eaten  - Assist patient with eating if necessary   - Allow adequate time for meals  - Recommend/ encourage appropriate diets, oral nutritional supplements, and vitamin/mineral supplements  - Order, calculate, and assess calorie counts as needed  - Recommend, monitor, and adjust tube feedings and TPN/PPN based on assessed needs  - Assess need for intravenous fluids  - Provide specific nutrition/hydration education as appropriate  - Include patient/family/caregiver in decisions related to nutrition  Outcome: Progressing

## 2024-08-22 NOTE — NURSING NOTE
"Patient was paranoid and guarded refusing all po medications despite multiple attempts by multiple staff to redirect, Ativan 1 mg IM and Zyprexa 5 mg IM given as ordered, patient continued to refuse po medication stating \"God told me not to take medication\". Patient denies SI and HI and continues to be paranoid with Jainism objections.  "

## 2024-08-23 LAB
GLUCOSE SERPL-MCNC: 115 MG/DL (ref 65–140)
GLUCOSE SERPL-MCNC: 83 MG/DL (ref 65–140)

## 2024-08-23 PROCEDURE — 99232 SBSQ HOSP IP/OBS MODERATE 35: CPT | Performed by: STUDENT IN AN ORGANIZED HEALTH CARE EDUCATION/TRAINING PROGRAM

## 2024-08-23 PROCEDURE — 82948 REAGENT STRIP/BLOOD GLUCOSE: CPT

## 2024-08-23 RX ADMIN — CYANOCOBALAMIN TAB 1000 MCG 1000 MCG: 1000 TAB at 08:11

## 2024-08-23 RX ADMIN — LORAZEPAM 0.5 MG: 0.5 TABLET ORAL at 17:00

## 2024-08-23 RX ADMIN — POLYETHYLENE GLYCOL 3350 17 G: 17 POWDER, FOR SOLUTION ORAL at 08:14

## 2024-08-23 RX ADMIN — ATORVASTATIN CALCIUM 10 MG: 10 TABLET, FILM COATED ORAL at 08:11

## 2024-08-23 RX ADMIN — MELATONIN TAB 3 MG 3 MG: 3 TAB at 21:14

## 2024-08-23 RX ADMIN — FAMOTIDINE 20 MG: 20 TABLET ORAL at 08:11

## 2024-08-23 RX ADMIN — CARVEDILOL 6.25 MG: 6.25 TABLET, FILM COATED ORAL at 08:11

## 2024-08-23 RX ADMIN — Medication 15 ML: at 09:31

## 2024-08-23 RX ADMIN — FAMOTIDINE 20 MG: 20 TABLET ORAL at 17:01

## 2024-08-23 RX ADMIN — FLUOXETINE 40 MG: 20 SOLUTION ORAL at 08:13

## 2024-08-23 RX ADMIN — FLUTICASONE FUROATE 1 PUFF: 100 POWDER RESPIRATORY (INHALATION) at 08:13

## 2024-08-23 RX ADMIN — LORAZEPAM 0.5 MG: 0.5 TABLET ORAL at 08:11

## 2024-08-23 RX ADMIN — PRAZOSIN HYDROCHLORIDE 2 MG: 1 CAPSULE ORAL at 21:14

## 2024-08-23 RX ADMIN — CARVEDILOL 6.25 MG: 6.25 TABLET, FILM COATED ORAL at 17:00

## 2024-08-23 RX ADMIN — CLOZAPINE 500 MG: 100 TABLET ORAL at 08:11

## 2024-08-23 RX ADMIN — SENNOSIDES AND DOCUSATE SODIUM 2 TABLET: 8.6; 5 TABLET ORAL at 21:14

## 2024-08-23 NOTE — NURSING NOTE
Meli is isolative and withdrawn. Meli denies all but is noted to be anxious at times. Meli took HS medications but Prazosin was held d/t her BP did not meet parameters. Emotional support provided. Will monitor and support during treatment.

## 2024-08-23 NOTE — PLAN OF CARE
Problem: Prexisting or High Potential for Compromised Skin Integrity  Goal: Skin integrity is maintained or improved  Description: INTERVENTIONS:  - Identify patients at risk for skin breakdown  - Assess and monitor skin integrity  - Assess and monitor nutrition and hydration status  - Monitor labs   - Assess for incontinence   - Turn and reposition patient  - Assist with mobility/ambulation  - Relieve pressure over bony prominences  - Avoid friction and shearing  - Provide appropriate hygiene as needed including keeping skin clean and dry  - Evaluate need for skin moisturizer/barrier cream  - Collaborate with interdisciplinary team   - Patient/family teaching  - Consider wound care consult   Outcome: Progressing     Problem: Alteration in Thoughts and Perception  Goal: Verbalize thoughts and feelings  Description: Interventions:  - Promote a nonjudgmental and trusting relationship with the patient through active listening and therapeutic communication  - Assess patient's level of functioning, behavior and potential for risk  - Engage patient in 1 on 1 interactions  - Encourage patient to express fears, feelings, frustrations, and discuss symptoms    - [place] patient to reality, help patient recognize reality-based thinking   - Administer medications as ordered and assess for potential side effects  - Provide the patient education related to the signs and symptoms of the illness and desired effects of prescribed medications  Outcome: Not Progressing     Problem: Ineffective Coping  Goal: Demonstrates healthy coping skills  Outcome: Not Progressing  Goal: Participates in unit activities  Description: Interventions:  - Provide therapeutic environment   - Provide required programming   - Redirect inappropriate behaviors   Outcome: Progressing  Goal: Free from restraint events  Description: - Utilize least restrictive measures   - Provide behavioral interventions   - Redirect inappropriate behaviors   Outcome:  Progressing     Problem: Risk for Self Injury/Neglect  Goal: Refrain from harming self  Description: Interventions:  - Monitor patient closely, per order  - Develop a trusting relationship  - Supervise medication ingestion, monitor effects and side effects   Outcome: Progressing     Problem: Depression  Goal: Treatment Goal: Demonstrate behavioral control of depressive symptoms, verbalize feelings of improved mood/affect, and adopt new coping skills prior to discharge  Outcome: Progressing  Goal: Refrain from self-neglect  Outcome: Progressing     Problem: SAFETY,RESTRAINT: NV/NON-SELF DESTRUCTIVE BEHAVIOR  Goal: Returns to optimal restraint-free functioning  Description: INTERVENTIONS:  - Assess the patient's behavior and symptoms that indicate continued need for restraint  - Identify and implement measures to help patient regain control  - Assess readiness for release of restraint   Outcome: Progressing     Problem: Potential for Falls  Goal: Patient will remain free of falls  Description: INTERVENTIONS:  - Educate patient on patient safety including physical limitations  - Instruct patient to call for assistance with activity   - Consult OT/PT to assist with strengthening/mobility   - Keep Call bell within reach  - Keep bed low and locked with side rails adjusted as appropriate  - Keep care items and personal belongings within reach  - Initiate and maintain comfort rounds  - Offer Toileting every 2 Hours, in advance of need  - Initiate/Maintain bed and chair alarm  - Obtain necessary fall risk management equipment: walker, wheelchair  - Apply yellow socks and bracelet for high fall risk patients  - Patient moved to room near nurses station  Outcome: Progressing

## 2024-08-23 NOTE — PROGRESS NOTES
"  Progress Note - Behavioral Health   Peck ESPINOZA Nowak 59 y.o. female MRN: [id number]  Unit/Bed#: [facility] 651-01 Encounter: [id number]    Subjective     Patient was visited on unit for continuing care; chart reviewed and discussed with multidisciplinary treatment team.  On approach, the patient was calm and cooperative. Endorsed better mood and less anxiety sxs, but continues to have negative thinking and ruminating thoughts. Denied any changes in appetite, and energy level. The patient remains guarded and paranoid but less intense with brighter affect. She apologized for her behavior yesterday and noted that she feels more in peace this morning and has planned to join the groups.  No problem initiating and maintaining sleep.  Denied A/VH since yesterday. Remains internally preoccupied. Denied SI/HI, intent or plan upon direct inquiry at this time.    The patient is intermittently visible in the milieu but remains mostly withdrawn to self. No reports of aggression or self-injurious behavior on unit. Received Ativan and Zyprexa IM PRN yesterday morning for psychotic agitation.    Patient accepted all offered medications later during the day when initially refused the AM meds yesterday and no adverse effects of medications noted or reported. The patient is on MOO. Clozaril is being uptitrated as the patient continues to have psychotic sxs despite significant improvements. Labs and level to be monitored.     Objective    Current Mental Status Evaluation:  Appearance and attitude: appeared as stated age, disheveled, dressed in hospital attire, with fair hygiene  Eye contact: fair  Motor Function: within normal limits, No PMA/PMR  Gait/station: Not observed  Speech: normal for rate, rhythm, volume, with increased latency and decreased amount  Language: No overt abnormality  Mood/affect: less dysphoric, \"better\" / Affect was constricted but reactive, mood congruent, brighter  Thought Processes: concrete, ruminating  Thought " content: denied suicidal ideations or homicidal ideations, Religion preoccupation, paranoid ideation, negative thinking, ruminations  Associations: concrete associations  Perceptual disturbances: denies Auditory/Visual/Tactile Hallucinations, internally preoccupied  Orientation: oriented to time, person, place and to the situational context  Cognitive Function: intact  Memory: not cooperative with formal MMSE  Intellect: unable to assess  Fund of knowledge: diminished  Impulse control: good  Insight/judgment: limited/limited    Pain : denied  Pain Scale : 0      Vital signs in last 24 hours:    Temp:  [96.6 °F (35.9 °C)-97.9 °F (36.6 °C)] 97.9 °F (36.6 °C)  HR:  [81-94] 81  Resp:  [14-19] 19  BP: (103-129)/(51-59) 128/58    Laboratory results: I have personally reviewed all pertinent laboratory/tests results    Results from the past 24 hours:   Recent Results (from the past 24 hour(s))   Fingerstick Glucose (POCT)    Collection Time: 08/22/24  4:16 PM   Result Value Ref Range    POC Glucose 133 65 - 140 mg/dl   Fingerstick Glucose (POCT)    Collection Time: 08/23/24  7:30 AM   Result Value Ref Range    POC Glucose 115 65 - 140 mg/dl           Assessment & Plan   Progress Toward Goals: making slow improvement    Assessment:  Principal Problem:    Schizoaffective disorder, bipolar type (HCC)  Active Problems:    Medical clearance for psychiatric admission    Type 2 diabetes mellitus (HCC)    Obesity    Tobacco abuse    Hyperlipidemia    Esophageal reflux    Essential (primary) hypertension    Vitamin B12 deficiency    Hypoxia    Ambulatory dysfunction    Mild protein-calorie malnutrition (HCC)        Plan:    - Encourage early mobility and having a structured day  - Provide frequent re-orientation, and cognitive stimulation  - Ensure assistive devices are in proper working order (eye-glasses, hearing aids)  - Encourage adequate hydration, nutrition and monitor bowel movements  - Maintain sleep-wake cycle:  Uninterrupted sleep time; low-level lighting at night  - Fall precaution  - f/u SLIM recs regarding the medical problems   - f/u labs  - Continue medication titration and treatment plan; adjust medication to optimize treatment response and as clinically indicated. .     Scheduled medications:  Current Facility-Administered Medications   Medication Dose Route Frequency Provider Last Rate    acetaminophen  650 mg Oral Q4H PRN JOSE ELIAS Figueroa      acetaminophen  650 mg Oral Q4H PRN JOSE ELIAS Figueroa      acetaminophen  975 mg Oral Q6H PRN JOSE ELIAS Figueroa      aluminum-magnesium hydroxide-simethicone  30 mL Oral Q4H PRN JOSE ELIAS Puri      atorvastatin  10 mg Oral Daily JOSE ELIAS Figueroa      bisacodyl  10 mg Rectal Daily PRN JOSE ELIAS Figueroa      carvedilol  6.25 mg Oral BID With Meals JOSE ELIAS Figueroa      cloNIDine  0.1 mg Transdermal Weekly JOSE ELIAS Figueroa      cloZAPine  500 mg Oral Daily JOSE ELIAS Figueroa      cyanocobalamin  1,000 mcg Oral Daily JOSE ELIAS Figueroa      famotidine  20 mg Oral BID Shan Fitzgerald DO      FLUoxetine  40 mg Oral Daily Dereje Banuelos MD      fluticasone  1 puff Inhalation Daily JOSE ELIAS Figueora      hydrOXYzine HCL  25 mg Oral Q6H PRN Max 4/day JOSE ELIAS Figueroa      hydrOXYzine HCL  50 mg Oral Q6H PRN Max 4/day JOSE ELIAS Figueroa      ipratropium-albuterol  3 mL Nebulization Q4H PRN Darin Lawton MD      LORazepam  0.5 mg Oral BID JOSE ELIAS Figueroa      Or    LORazepam  0.5 mg Intramuscular BID JOSE ELIAS Figueroa      LORazepam  1 mg Intramuscular Q6H PRN Max 3/day JOSE ELIAS Figueroa      LORazepam  1 mg Oral Q6H PRN Max 3/day JOSE ELIAS Figueroa      melatonin  3 mg Oral HS JOSE ELIAS Figueroa      Multivitamin  15 mL Oral Daily JOSE ELIAS Figueroa      nicotine  1 patch Transdermal Daily JOSE ELIAS Figueroa      OLANZapine  2.5 mg Oral Q6H PRN Dereje Banuelos MD      Or    OLANZapine  2.5 mg  Intramuscular Q6H PRN Dereje Banuelos MD      OLANZapine  5 mg Oral Q6H PRN Dereje Banuelos MD      Or    OLANZapine  5 mg Intramuscular Q6H PRN Dereje Banuelos MD      ondansetron  4 mg Oral Q6H PRN Darin Lawton MD      polyethylene glycol  17 g Oral Daily PRN JOSE ELIAS Figueroa      polyethylene glycol  17 g Oral Daily Kristen TomasJOSE ELIAS Weller      prazosin  2 mg Oral HS JOSE ELIAS Figueroa      senna-docusate sodium  2 tablet Oral HS Rehana Borrego PA-C      traZODone  50 mg Oral HS PRN JOSE ELIAS Figueroa          PRN:    acetaminophen    acetaminophen    acetaminophen    aluminum-magnesium hydroxide-simethicone    bisacodyl    hydrOXYzine HCL    hydrOXYzine HCL    ipratropium-albuterol    LORazepam    LORazepam    OLANZapine **OR** OLANZapine    OLANZapine **OR** OLANZapine    ondansetron    polyethylene glycol    traZODone    - Observation: routine            - VS: as per unit protocol  - Diet: Regular diet     - Psychoeducation (benefits and potential risks) discussed, importance of compliance with the psychiatric treatment reiterated, and the patient verbalized understanding of the matter  - Encourage group attendance and milieu therapy      - The pt was educated and agreed to verbalize any suicidal thoughts, frustrations or concerns to the nursing staff, immediately.   - Dispo: To be determined       Next of Kin  Extended Emergency Contact Information  Primary Emergency Contact: Chari Nowak  Mobile Phone: [phone]  Relation: Daughter  Secondary Emergency Contact: Conchita Hall  [place] Phone: [phone]  Mobile Phone: [phone]  Relation: Other      Counseling / Coordination of Care  Patient's progress discussed with staff in treatment team meeting.  Medications, treatment progress and treatment plan reviewed with patient.  Medication education provided to patient.  Supportive therapy provided to patient.  Cognitive techniques utilized during the session.  Reassurance and supportive  therapy provided.  Reoriented to reality and reassured.  Encouraged participation in milieu and group therapy on the unit.  Crisis/safety plan discussed with patient.       Dereje Banuelos MD  Attending Psychiatrist   [facility]       This note was completed in part utilizing Dragon dictation Software. Grammatical, translation, syntax errors, random word insertions, spelling mistakes, and incomplete sentences may be an occasional consequence of this system secondary to software limitations with voice recognition, ambient noise, and hardware issues. If you have any questions or concerns about the content, text, or information contained within the body of this dictation, please contact the provider for clarification.

## 2024-08-23 NOTE — CASE MANAGEMENT
CM met with pt, reviewed plan for home vs rehab. Pt reports preference for home d/c plan. CM reviewed goals needed to achieve home d/c plan. Pt reports willing to take medications inpt and upon d/c.

## 2024-08-23 NOTE — PROGRESS NOTES
08/23/24 0752   Team Meeting   Meeting Type Daily Rounds   Initial Conference Date 08/23/24   Next Conference Date 08/26/24   Team Members Present   Team Members Present Physician;Nurse;;   Physician Team Member Dr Banuelos, JAIR Romero   Nursing Team Member Aleida   Care Management Team Member Anum   Social Work Team Member Ayse   Patient/Family Present   Patient Present No   Patient's Family Present No     Withdrawn to room, believes god is talking to her, ate 100% lunch 50% dinner, delusions thinking she is dead, refused meds, received IM's, keeping ativan, discharge pending stabilization

## 2024-08-23 NOTE — NURSING NOTE
Pt blunted but pleasant and med-compliant with steady gait.  Good appetite at breakfast, refused lunch.  VSS.  Denied AH and no RIS noted.   at 0800.  Monitored for safety and support.

## 2024-08-24 LAB
GLUCOSE SERPL-MCNC: 132 MG/DL (ref 65–140)
GLUCOSE SERPL-MCNC: 94 MG/DL (ref 65–140)

## 2024-08-24 PROCEDURE — 82948 REAGENT STRIP/BLOOD GLUCOSE: CPT

## 2024-08-24 PROCEDURE — 99232 SBSQ HOSP IP/OBS MODERATE 35: CPT | Performed by: PHYSICIAN ASSISTANT

## 2024-08-24 RX ADMIN — Medication 15 ML: at 08:19

## 2024-08-24 RX ADMIN — FAMOTIDINE 20 MG: 20 TABLET ORAL at 08:20

## 2024-08-24 RX ADMIN — CYANOCOBALAMIN TAB 1000 MCG 1000 MCG: 1000 TAB at 08:20

## 2024-08-24 RX ADMIN — FLUTICASONE FUROATE 1 PUFF: 100 POWDER RESPIRATORY (INHALATION) at 08:21

## 2024-08-24 RX ADMIN — CARVEDILOL 6.25 MG: 6.25 TABLET, FILM COATED ORAL at 08:20

## 2024-08-24 RX ADMIN — ATORVASTATIN CALCIUM 10 MG: 10 TABLET, FILM COATED ORAL at 08:20

## 2024-08-24 RX ADMIN — PRAZOSIN HYDROCHLORIDE 2 MG: 1 CAPSULE ORAL at 21:18

## 2024-08-24 RX ADMIN — LORAZEPAM 0.5 MG: 0.5 TABLET ORAL at 17:06

## 2024-08-24 RX ADMIN — CARVEDILOL 6.25 MG: 6.25 TABLET, FILM COATED ORAL at 17:06

## 2024-08-24 RX ADMIN — SENNOSIDES AND DOCUSATE SODIUM 2 TABLET: 8.6; 5 TABLET ORAL at 21:18

## 2024-08-24 RX ADMIN — FLUOXETINE 40 MG: 20 SOLUTION ORAL at 08:21

## 2024-08-24 RX ADMIN — FAMOTIDINE 20 MG: 20 TABLET ORAL at 17:06

## 2024-08-24 RX ADMIN — LORAZEPAM 0.5 MG: 0.5 TABLET ORAL at 08:20

## 2024-08-24 RX ADMIN — MELATONIN TAB 3 MG 3 MG: 3 TAB at 21:18

## 2024-08-24 RX ADMIN — CLOZAPINE 500 MG: 100 TABLET ORAL at 08:20

## 2024-08-24 NOTE — NURSING NOTE
Patient calm, cooperative, visible on millieu for evening snack otherwise withdrawn to room lying in bed.  She is pleasant and able to make needs known.  She reports she wanted to shower today and had items but needed assistance.  She was encouraged to seek assistance tomorrow and she agreed.  Patient denies anxiety, depression, HI/SI/AVH and reports sleeping well.  Safety plan reviewed.

## 2024-08-24 NOTE — PLAN OF CARE
Problem: DISCHARGE PLANNING - CARE MANAGEMENT  Goal: Discharge to post-acute care or home with appropriate resources  Description: INTERVENTIONS:  - Conduct assessment to determine patient/family and health care team treatment goals, and need for post-acute services based on payer coverage, community resources, and patient preferences, and barriers to discharge  - Address psychosocial, clinical, and financial barriers to discharge as identified in assessment in conjunction with the patient/family and health care team  - Arrange appropriate level of post-acute services according to patient’s   needs and preference and payer coverage in collaboration with the physician and health care team  - Communicate with and update the patient/family, physician, and health care team regarding progress on the discharge plan  - Arrange appropriate transportation to post-acute venues  Outcome: Progressing     Problem: Prexisting or High Potential for Compromised Skin Integrity  Goal: Skin integrity is maintained or improved  Description: INTERVENTIONS:  - Identify patients at risk for skin breakdown  - Assess and monitor skin integrity  - Assess and monitor nutrition and hydration status  - Monitor labs   - Assess for incontinence   - Turn and reposition patient  - Assist with mobility/ambulation  - Relieve pressure over bony prominences  - Avoid friction and shearing  - Provide appropriate hygiene as needed including keeping skin clean and dry  - Evaluate need for skin moisturizer/barrier cream  - Collaborate with interdisciplinary team   - Patient/family teaching  - Consider wound care consult   Outcome: Progressing     Problem: Alteration in Thoughts and Perception  Goal: Treatment Goal: Gain control of psychotic behaviors/thinking, reduce/eliminate presenting symptoms and demonstrate improved reality functioning upon discharge  Outcome: Progressing  Goal: Verbalize thoughts and feelings  Description: Interventions:  - Promote  a nonjudgmental and trusting relationship with the patient through active listening and therapeutic communication  - Assess patient's level of functioning, behavior and potential for risk  - Engage patient in 1 on 1 interactions  - Encourage patient to express fears, feelings, frustrations, and discuss symptoms    - [place] patient to reality, help patient recognize reality-based thinking   - Administer medications as ordered and assess for potential side effects  - Provide the patient education related to the signs and symptoms of the illness and desired effects of prescribed medications  Outcome: Progressing  Goal: Refrain from acting on delusional thinking/internal stimuli  Description: Interventions:  - Monitor patient closely, per order   - Utilize least restrictive measures   - Set reasonable limits, give positive feedback for acceptable   - Administer medications as ordered and monitor of potential side effects  Outcome: Progressing  Goal: Agree to be compliant with medication regime, as prescribed and report medication side effects  Description: Interventions:  - Offer appropriate PRN medication and supervise ingestion; conduct AIMS, as needed   Outcome: Progressing  Goal: Recognize dysfunctional thoughts, communicate reality-based thoughts at the time of discharge  Description: Interventions:  - Provide medication and psycho-education to assist patient in compliance and developing insight into his/her illness   Outcome: Progressing  Goal: Complete daily ADLs, including personal hygiene independently, as able  Description: Interventions:  - Observe, teach, and assist patient with ADLS  - Monitor and promote a balance of rest/activity, with adequate nutrition and elimination   Outcome: Progressing     Problem: Ineffective Coping  Goal: Identifies ineffective coping skills  Outcome: Progressing  Goal: Demonstrates healthy coping skills  Outcome: Progressing  Goal: Patient/Family participate in treatment and DC  plans  Description: Interventions:  - Provide therapeutic environment  Outcome: Progressing  Goal: Patient/Family verbalizes awareness of resources  Outcome: Progressing  Goal: Understands least restrictive measures  Description: Interventions:  - Utilize least restrictive behavior  Outcome: Progressing  Goal: Free from restraint events  Description: - Utilize least restrictive measures   - Provide behavioral interventions   - Redirect inappropriate behaviors   Outcome: Progressing     Problem: Risk for Self Injury/Neglect  Goal: Treatment Goal: Remain safe during length of stay, learn and adopt new coping skills, and be free of self-injurious ideation, impulses and acts at the time of discharge  Outcome: Progressing  Goal: Verbalize thoughts and feelings  Description: Interventions:  - Assess and re-assess patient's lethality and potential for self-injury  - Engage patient in 1:1 interactions, daily, for a minimum of 15 minutes  - Encourage patient to express feelings, fears, frustrations, hopes  - Establish rapport/trust with patient   Outcome: Progressing  Goal: Refrain from harming self  Description: Interventions:  - Monitor patient closely, per order  - Develop a trusting relationship  - Supervise medication ingestion, monitor effects and side effects   Outcome: Progressing  Goal: Recognize maladaptive responses and adopt new coping mechanisms  Outcome: Progressing     Problem: Depression  Goal: Treatment Goal: Demonstrate behavioral control of depressive symptoms, verbalize feelings of improved mood/affect, and adopt new coping skills prior to discharge  Outcome: Progressing  Goal: Verbalize thoughts and feelings  Description: Interventions:  - Assess and re-assess patient's level of risk   - Engage patient in 1:1 interactions, daily, for a minimum of 15 minutes   - Encourage patient to express feelings, fears, frustrations, hopes   Outcome: Progressing  Goal: Refrain from isolation  Description:  Interventions:  - Develop a trusting relationship   - Encourage socialization   Outcome: Progressing  Goal: Refrain from self-neglect  Outcome: Progressing     Problem: Anxiety  Goal: Anxiety is at manageable level  Description: Interventions:  - Assess and monitor patient's anxiety level.   - Monitor for signs and symptoms (heart palpitations, chest pain, shortness of breath, headaches, nausea, feeling jumpy, restlessness, irritable, apprehensive).   - Collaborate with interdisciplinary team and initiate plan and interventions as ordered.  - [place] patient to unit/surroundings  - Explain treatment plan  - Encourage participation in care  - Encourage verbalization of concerns/fears  - Identify coping mechanisms  - Assist in developing anxiety-reducing skills  - Administer/offer alternative therapies  - Limit or eliminate stimulants  Outcome: Progressing     Problem: SAFETY,RESTRAINT: NV/NON-SELF DESTRUCTIVE BEHAVIOR  Goal: Remains free of harm/injury (restraint for non violent/non self-detsructive behavior)  Description: INTERVENTIONS:  - Instruct patient/family regarding restraint use   - Assess and monitor physiologic and psychological status   - Provide interventions and comfort measures to meet assessed patient needs   - Identify and implement measures to help patient regain control  - Assess readiness for release of restraint   Outcome: Progressing  Goal: Returns to optimal restraint-free functioning  Description: INTERVENTIONS:  - Assess the patient's behavior and symptoms that indicate continued need for restraint  - Identify and implement measures to help patient regain control  - Assess readiness for release of restraint   Outcome: Progressing     Problem: Potential for Falls  Goal: Patient will remain free of falls  Description: INTERVENTIONS:  - Educate patient on patient safety including physical limitations  - Instruct patient to call for assistance with activity   - Consult OT/PT to assist with  strengthening/mobility   - Keep Call bell within reach  - Keep bed low and locked with side rails adjusted as appropriate  - Keep care items and personal belongings within reach  - Initiate and maintain comfort rounds  - Offer Toileting every 2 Hours, in advance of need  - Initiate/Maintain bed and chair alarm  - Obtain necessary fall risk management equipment: walker, wheelchair  - Apply yellow socks and bracelet for high fall risk patients  - Patient moved to room near nurses station  Outcome: Progressing     Problem: Nutrition/Hydration-ADULT  Goal: Nutrient/Hydration intake appropriate for improving, restoring or maintaining nutritional needs  Description: Monitor and assess patient's nutrition/hydration status for malnutrition. Collaborate with interdisciplinary team and initiate plan and interventions as ordered.  Monitor patient's weight and dietary intake as ordered or per policy. Utilize nutrition screening tool and intervene as necessary. Determine patient's food preferences and provide high-protein, high-caloric foods as appropriate.     INTERVENTIONS:  - Monitor oral intake, urinary output, labs, and treatment plans  - Assess nutrition and hydration status and recommend course of action  - Evaluate amount of meals eaten  - Assist patient with eating if necessary   - Allow adequate time for meals  - Recommend/ encourage appropriate diets, oral nutritional supplements, and vitamin/mineral supplements  - Order, calculate, and assess calorie counts as needed  - Recommend, monitor, and adjust tube feedings and TPN/PPN based on assessed needs  - Assess need for intravenous fluids  - Provide specific nutrition/hydration education as appropriate  - Include patient/family/caregiver in decisions related to nutrition  Outcome: Progressing

## 2024-08-24 NOTE — PROGRESS NOTES
"Progress Note - Behavioral Health   Meli Nowak 59 y.o. female MRN: [id number]  Unit/Bed#: [facility] 651-01 Encounter: [id number]    Meli was seen for follow-up, chart reviewed and discussed with nursing staff.  Nursing staff notes she is medications over objection.  She remains paranoid and suspicious.  She had been reported auditory hallucinations to [facility] of [facility] and her  father telling her not to eat or take meds but staff notes that she has been compliant with medications yesterday and so far today.  Tends to be withdrawn and isolative to her room.  No aggression or agitation.    Patient seen this morning in her room lying in bed.  States \" I do not feel like I am here.  I feel like I am dead\".  Appears internally preoccupied and bizarre, disorganized thought process.  Currently denies auditory or visual hallucinations.  Denies suicidal or homicidal ideation.  She did not leave her room this morning for breakfast so did not eat.  States that she \"could not get herself to the dining room\".  Supportive psychotherapy provided and she is in agreement with getting out of her room to eat lunch.  Noted that she did eat 100% of her lunch.    Behavior over the last 24 hours:  unchanged  Sleep: normal  Appetite: Variable  Medication side effects: No  ROS:  As noted in HPI  /61 (BP Location: Left arm)   Pulse 87   Temp 98 °F (36.7 °C) (Temporal)   Resp 17   Ht 5' 7\" (1.702 m)   Wt 93.9 kg (207 lb 0.2 oz)   SpO2 94%   BMI 32.42 kg/m²     Medications:   Current Facility-Administered Medications   Medication Dose Route Frequency    acetaminophen (TYLENOL) tablet 650 mg  650 mg Oral Q4H PRN    acetaminophen (TYLENOL) tablet 650 mg  650 mg Oral Q4H PRN    acetaminophen (TYLENOL) tablet 975 mg  975 mg Oral Q6H PRN    aluminum-magnesium hydroxide-simethicone (MAALOX) oral suspension 30 mL  30 mL Oral Q4H PRN    atorvastatin (LIPITOR) tablet 10 mg  10 mg Oral Daily    bisacodyl (DULCOLAX) rectal suppository 10 " mg  10 mg Rectal Daily PRN    carvedilol (COREG) tablet 6.25 mg  6.25 mg Oral BID With Meals    cloNIDine (CATAPRES-TTS-1) 0.1 mg/24 hr TD weekly patch  0.1 mg Transdermal Weekly    cloZAPine (CLOZARIL) tablet 500 mg  500 mg Oral Daily    cyanocobalamin (VITAMIN B-12) tablet 1,000 mcg  1,000 mcg Oral Daily    famotidine (PEPCID) tablet 20 mg  20 mg Oral BID    FLUoxetine (PROzac) oral solution 40 mg  40 mg Oral Daily    fluticasone (ARNUITY ELLIPTA) 100 MCG/ACT inhaler 1 puff  1 puff Inhalation Daily    hydrOXYzine HCL (ATARAX) tablet 25 mg  25 mg Oral Q6H PRN Max 4/day    hydrOXYzine HCL (ATARAX) tablet 50 mg  50 mg Oral Q6H PRN Max 4/day    ipratropium-albuterol (DUO-NEB) 0.5-2.5 mg/3 mL inhalation solution 3 mL  3 mL Nebulization Q4H PRN    LORazepam (ATIVAN) tablet 0.5 mg  0.5 mg Oral BID    Or    LORazepam (ATIVAN) injection 0.5 mg  0.5 mg Intramuscular BID    LORazepam (ATIVAN) injection 1 mg  1 mg Intramuscular Q6H PRN Max 3/day    LORazepam (ATIVAN) tablet 1 mg  1 mg Oral Q6H PRN Max 3/day    melatonin tablet 3 mg  3 mg Oral HS    Multivitamin liquid 15 mL  15 mL Oral Daily    nicotine (NICODERM CQ) 7 mg/24hr TD 24 hr patch 1 patch  1 patch Transdermal Daily    OLANZapine (ZyPREXA) tablet 2.5 mg  2.5 mg Oral Q6H PRN    Or    OLANZapine (ZyPREXA) IM injection 2.5 mg  2.5 mg Intramuscular Q6H PRN    OLANZapine (ZyPREXA) tablet 5 mg  5 mg Oral Q6H PRN    Or    OLANZapine (ZyPREXA) IM injection 5 mg  5 mg Intramuscular Q6H PRN    ondansetron (ZOFRAN-ODT) dispersible tablet 4 mg  4 mg Oral Q6H PRN    polyethylene glycol (MIRALAX) packet 17 g  17 g Oral Daily PRN    polyethylene glycol (MIRALAX) packet 17 g  17 g Oral Daily    prazosin (MINIPRESS) capsule 2 mg  2 mg Oral HS    senna-docusate sodium (SENOKOT S) 8.6-50 mg per tablet 2 tablet  2 tablet Oral HS    traZODone (DESYREL) tablet 50 mg  50 mg Oral HS PRN       Labs:   No results displayed because visit has over 200 results.          Mental Status  Evaluation:  Appearance:  age appropriate and disheveled   Behavior:  evasive, guarded, and fair eye contact   Speech:  normal pitch and normal volume   Mood:  depressed   Affect:  constricted   Thought Process:  concrete   Thought Content:     Associations: delusions  persecutory, bizarre    [place]   Perceptual Disturbances: Appears internally preoccupied   Risk Potential: Suicidal Ideations none, Homicidal Ideations none, and Potential for Aggression No   Sensorium:  person and place   Cognition:  recent and remote memory grossly intact   Consciousness:  alert and awake    Attention: attention span appeared shorter than expected for age   Insight:  Poor   Judgment: Poor   Gait/Station: Not observed, lying in bed   Motor Activity: no abnormal movements     Progress Toward Goals: No change, continues with psychotic symptoms    Assessment & Plan   Principal Problem:    Schizoaffective disorder, bipolar type (HCC)  Active Problems:    Medical clearance for psychiatric admission    Type 2 diabetes mellitus (HCC)    Obesity    Tobacco abuse    Hyperlipidemia    Esophageal reflux    Essential (primary) hypertension    Vitamin B12 deficiency    Hypoxia    Ambulatory dysfunction    Mild protein-calorie malnutrition (HCC)      Recommended Treatment:   Prozac 40 mg daily  Clozaril 500 mg daily  Clozapine level 559 on 8/19/2024, ANC 4.50 on 8/19/2024  Lorazepam 0.5 mg twice daily  Melatonin 3 mg nightly  Prazosin 2 mg nightly    Continue with group therapy, milieu therapy and occupational therapy.      Risks, benefits and possible side effects of Medications:   Risks, benefits, and possible side effects of medications explained to patient and patient verbalizes understanding.        Counseling / Coordination of Care  Total floor / unit time spent today 25 minutes. Greater than 50% of total time was spent with the patient and / or family counseling and / or coordination of care. A description of the counseling / coordination  of care: Medication management, chart review, patient interview

## 2024-08-24 NOTE — NURSING NOTE
"Patient withdrawn to room.  Patient tells writer \"I am just here. I'll take whatever you want me to take. I am just a soul. I believe I'm dead. I don't see the sun. I see just white. Do you you see the sun?\". Patient did take meds without issue. Patient VSS. Continual safety checks ongoing  "

## 2024-08-25 LAB
GLUCOSE SERPL-MCNC: 103 MG/DL (ref 65–140)
GLUCOSE SERPL-MCNC: 117 MG/DL (ref 65–140)

## 2024-08-25 PROCEDURE — 82948 REAGENT STRIP/BLOOD GLUCOSE: CPT

## 2024-08-25 PROCEDURE — 99232 SBSQ HOSP IP/OBS MODERATE 35: CPT

## 2024-08-25 RX ADMIN — CLONIDINE 0.1 MG: 0.1 PATCH TRANSDERMAL at 08:22

## 2024-08-25 RX ADMIN — Medication 15 ML: at 10:06

## 2024-08-25 RX ADMIN — FAMOTIDINE 20 MG: 20 TABLET ORAL at 17:15

## 2024-08-25 RX ADMIN — ATORVASTATIN CALCIUM 10 MG: 10 TABLET, FILM COATED ORAL at 08:14

## 2024-08-25 RX ADMIN — CARVEDILOL 6.25 MG: 6.25 TABLET, FILM COATED ORAL at 08:14

## 2024-08-25 RX ADMIN — FLUTICASONE FUROATE 1 PUFF: 100 POWDER RESPIRATORY (INHALATION) at 08:16

## 2024-08-25 RX ADMIN — MELATONIN TAB 3 MG 3 MG: 3 TAB at 21:22

## 2024-08-25 RX ADMIN — FAMOTIDINE 20 MG: 20 TABLET ORAL at 08:14

## 2024-08-25 RX ADMIN — CYANOCOBALAMIN TAB 1000 MCG 1000 MCG: 1000 TAB at 08:14

## 2024-08-25 RX ADMIN — POLYETHYLENE GLYCOL 3350 17 G: 17 POWDER, FOR SOLUTION ORAL at 08:22

## 2024-08-25 RX ADMIN — SENNOSIDES AND DOCUSATE SODIUM 2 TABLET: 8.6; 5 TABLET ORAL at 21:22

## 2024-08-25 RX ADMIN — LORAZEPAM 0.5 MG: 0.5 TABLET ORAL at 17:15

## 2024-08-25 RX ADMIN — CLOZAPINE 500 MG: 100 TABLET ORAL at 08:14

## 2024-08-25 RX ADMIN — PRAZOSIN HYDROCHLORIDE 2 MG: 1 CAPSULE ORAL at 21:22

## 2024-08-25 RX ADMIN — CARVEDILOL 6.25 MG: 6.25 TABLET, FILM COATED ORAL at 17:15

## 2024-08-25 RX ADMIN — LORAZEPAM 0.5 MG: 0.5 TABLET ORAL at 08:14

## 2024-08-25 RX ADMIN — FLUOXETINE 40 MG: 20 SOLUTION ORAL at 08:21

## 2024-08-25 NOTE — NURSING NOTE
Patient visible for breakfast. Patient is med compliant. Appetite intact. VSS. Denies unmet needs at this time. Denies SI/HI/AH/VH. Continual safety checks ongoing

## 2024-08-25 NOTE — NURSING NOTE
Pt withdrawn to room most of shift. Present for meals and snacks. Medication and meal compliant. Denies SI/HI. Q 7 minute checks maintained. Mouth checks continued, no pocketing noted.

## 2024-08-25 NOTE — PLAN OF CARE
Problem: Alteration in Thoughts and Perception  Goal: Treatment Goal: Gain control of psychotic behaviors/thinking, reduce/eliminate presenting symptoms and demonstrate improved reality functioning upon discharge  Outcome: Progressing  Goal: Verbalize thoughts and feelings  Description: Interventions:  - Promote a nonjudgmental and trusting relationship with the patient through active listening and therapeutic communication  - Assess patient's level of functioning, behavior and potential for risk  - Engage patient in 1 on 1 interactions  - Encourage patient to express fears, feelings, frustrations, and discuss symptoms    - [place] patient to reality, help patient recognize reality-based thinking   - Administer medications as ordered and assess for potential side effects  - Provide the patient education related to the signs and symptoms of the illness and desired effects of prescribed medications  Outcome: Progressing  Goal: Refrain from acting on delusional thinking/internal stimuli  Description: Interventions:  - Monitor patient closely, per order   - Utilize least restrictive measures   - Set reasonable limits, give positive feedback for acceptable   - Administer medications as ordered and monitor of potential side effects  Outcome: Progressing  Goal: Agree to be compliant with medication regime, as prescribed and report medication side effects  Description: Interventions:  - Offer appropriate PRN medication and supervise ingestion; conduct AIMS, as needed   Outcome: Progressing  Goal: Attend and participate in unit activities, including therapeutic, recreational, and educational groups  Description: Interventions:  -Encourage Visitation and family involvement in care  Outcome: Progressing  Goal: Recognize dysfunctional thoughts, communicate reality-based thoughts at the time of discharge  Description: Interventions:  - Provide medication and psycho-education to assist patient in compliance and developing  insight into his/her illness   Outcome: Progressing  Goal: Complete daily ADLs, including personal hygiene independently, as able  Description: Interventions:  - Observe, teach, and assist patient with ADLS  - Monitor and promote a balance of rest/activity, with adequate nutrition and elimination   Outcome: Progressing     Problem: Risk for Self Injury/Neglect  Goal: Treatment Goal: Remain safe during length of stay, learn and adopt new coping skills, and be free of self-injurious ideation, impulses and acts at the time of discharge  Outcome: Progressing  Goal: Verbalize thoughts and feelings  Description: Interventions:  - Assess and re-assess patient's lethality and potential for self-injury  - Engage patient in 1:1 interactions, daily, for a minimum of 15 minutes  - Encourage patient to express feelings, fears, frustrations, hopes  - Establish rapport/trust with patient   Outcome: Progressing  Goal: Refrain from harming self  Description: Interventions:  - Monitor patient closely, per order  - Develop a trusting relationship  - Supervise medication ingestion, monitor effects and side effects   Outcome: Progressing  Goal: Attend and participate in unit activities, including therapeutic, recreational, and educational groups  Description: Interventions:  - Provide therapeutic and educational activities daily, encourage attendance and participation, and document same in the medical record  - Obtain collateral information, encourage visitation and family involvement in care   Outcome: Progressing  Goal: Recognize maladaptive responses and adopt new coping mechanisms  Outcome: Progressing

## 2024-08-25 NOTE — PROGRESS NOTES
"Progress Note - Behavioral Health   Meil Nowak 59 y.o. female MRN: [id number]  Unit/Bed#: [facility] 651-01 Encounter: [id number]    Assessment   Principal Problem:    Schizoaffective disorder, bipolar type (HCC)  Active Problems:    Medical clearance for psychiatric admission    Type 2 diabetes mellitus (HCC)    Obesity    Tobacco abuse    Hyperlipidemia    Esophageal reflux    Essential (primary) hypertension    Vitamin B12 deficiency    Hypoxia    Ambulatory dysfunction    Mild protein-calorie malnutrition (HCC)      Plan    No medication changes indicated.  Continue current medications.  Medical management per SLIM.  Discharge Planning    ----------------------------------------      Subjective: Per nursing report patient has had no acute behavioral events in the past 24 hours.    On examination, patient is in bed, sleeping, before writer wakes her up for evaluation. Endorses that she no longer hears voices and that the medication is working. Denies feeling depressed or anxious, and endorses a good appetite. She cannot think of any particular complaints at this time, and notes that \"this place has been like second a home\" though hopes to leave soon. Appears bizarre on examination, flat affect, not responding to internal stimuli obviously but does appear internally preoccupied.      Behavior over the last 24 hours:  unchanged  Sleep: normal  Appetite: normal  Medication side effects: No  ROS: no complaints    Mental Status Evaluation:  Appearance:  disheveled, wearing hospital clothes   Behavior:  cooperative, calm, bizarre   Speech:  normal rate and volume   Mood:  euthymic   Affect:  Flat affect   Thought Process:  logical, coherent, goal directed, linear   Associations: concrete associations   Thought Content:  no overt delusions   Perceptual Disturbances: no auditory hallucinations, appears preoccupied   Risk Potential: Suicidal ideation - None at present  Homicidal ideation - None at present  Potential for " aggression - No   Sensorium:  oriented to person, place, and time/date   Memory:  recent and remote memory grossly intact   Consciousness:  alert and awake   Attention/Concentration: attention span and concentration are age appropriate   Insight:  limited   Judgment: limited   Gait/Station: in bed   Motor Activity: no abnormal movements     Medications: all current active meds have been reviewed.  Current Facility-Administered Medications   Medication Dose Route Frequency Provider Last Rate    acetaminophen  650 mg Oral Q4H PRN JOSE ELIAS Figueroa      acetaminophen  650 mg Oral Q4H PRN Marie Ziegler, JOSE ELIAS      acetaminophen  975 mg Oral Q6H PRN JOSE ELIAS Figueroa      aluminum-magnesium hydroxide-simethicone  30 mL Oral Q4H PRN JOSE ELIAS Puri      atorvastatin  10 mg Oral Daily Marie Ziegler, JOSE ELIAS      bisacodyl  10 mg Rectal Daily PRN Marie Ziegler, JOSE ELIAS      carvedilol  6.25 mg Oral BID With Meals JOSE ELIAS Figueroa      cloNIDine  0.1 mg Transdermal Weekly JOSE ELIAS Figueroa      cloZAPine  500 mg Oral Daily JOSE ELIAS Figueroa      cyanocobalamin  1,000 mcg Oral Daily JOSE ELIAS Figueroa      famotidine  20 mg Oral BID Shan Fitzgerald DO      FLUoxetine  40 mg Oral Daily Dereje Banuelos MD      fluticasone  1 puff Inhalation Daily JOSE ELIAS Figueroa      hydrOXYzine HCL  25 mg Oral Q6H PRN Max 4/day Marie Ziegler, JOSE ELIAS      hydrOXYzine HCL  50 mg Oral Q6H PRN Max 4/day JOSE ELIAS Figueroa      ipratropium-albuterol  3 mL Nebulization Q4H PRN Darin Lawton MD      LORazepam  0.5 mg Oral BID JOSE ELIAS Figueroa      Or    LORazepam  0.5 mg Intramuscular BID Marie Ziegler, JOSE ELIAS      LORazepam  1 mg Intramuscular Q6H PRN Max 3/day JOSE ELIAS Figueroa      LORazepam  1 mg Oral Q6H PRN Max 3/day JOSE ELIAS Figueroa      melatonin  3 mg Oral HS JOSE ELIAS Figueroa      Multivitamin  15 mL Oral Daily Marie Ziegler, JOSE ELIAS      nicotine  1 patch Transdermal Daily  JOSE ELIAS Figueroa      OLANZapine  2.5 mg Oral Q6H PRN Dereje Banuelos MD      Or    OLANZapine  2.5 mg Intramuscular Q6H PRN Dereje Banuelos MD      OLANZapine  5 mg Oral Q6H PRN Dereje Banuelos MD      Or    OLANZapine  5 mg Intramuscular Q6H PRN Dereje Banuelos MD      ondansetron  4 mg Oral Q6H PRN Darin Lawton MD      polyethylene glycol  17 g Oral Daily PRN JOSE ELIAS Figueroa      polyethylene glycol  17 g Oral Daily JOSE ELIAS Ortega      prazosin  2 mg Oral HS JOSE ELIAS Figueroa      senna-docusate sodium  2 tablet Oral HS Rehana Borrego PA-C      traZODone  50 mg Oral HS PRN JOSE ELIAS Figueroa         Labs: I have personally reviewed all pertinent laboratory/tests results  Results from the past 24 hours:   Recent Results (from the past 24 hour(s))   Fingerstick Glucose (POCT)    Collection Time: 08/24/24  3:39 PM   Result Value Ref Range    POC Glucose 132 65 - 140 mg/dl   Fingerstick Glucose (POCT)    Collection Time: 08/25/24  7:21 AM   Result Value Ref Range    POC Glucose 103 65 - 140 mg/dl       Progress Toward Goals: progressing    Risks / Benefits of Treatment:    Risks, benefits, and possible side effects of medications explained to patient and patient verbalizes understanding and agreement for treatment.    Counseling / Coordination of Care:    Total floor / unit time spent today 20 minutes. Greater than 50% of total time was spent with the patient and / or family counseling and / or coordination of care. A description of counseling / coordination of care:  Patient's progress discussed with staff in treatment team meeting.  Medications, treatment progress and treatment plan reviewed with patient.  Reassurance and supportive therapy provided.  Encouraged participation in milieu and group therapy on the unit.    Leon Mckinley MD 08/25/24

## 2024-08-26 LAB
BASOPHILS # BLD AUTO: 0.06 THOUSANDS/ΜL (ref 0–0.1)
BASOPHILS NFR BLD AUTO: 1 % (ref 0–1)
CK SERPL-CCNC: 18 U/L (ref 26–192)
CLOZAPINE SERPL-MCNC: 668 NG/ML (ref 350–900)
CRP SERPL QL: 3.6 MG/L
EOSINOPHIL # BLD AUTO: 0 THOUSAND/ΜL (ref 0–0.61)
EOSINOPHIL NFR BLD AUTO: 0 % (ref 0–6)
ERYTHROCYTE [DISTWIDTH] IN BLOOD BY AUTOMATED COUNT: 15.1 % (ref 11.6–15.1)
GLUCOSE SERPL-MCNC: 101 MG/DL (ref 65–140)
GLUCOSE SERPL-MCNC: 103 MG/DL (ref 65–140)
HCT VFR BLD AUTO: 43.3 % (ref 34.8–46.1)
HGB BLD-MCNC: 14.5 G/DL (ref 11.5–15.4)
IMM GRANULOCYTES # BLD AUTO: 0.02 THOUSAND/UL (ref 0–0.2)
IMM GRANULOCYTES NFR BLD AUTO: 0 % (ref 0–2)
LYMPHOCYTES # BLD AUTO: 2.42 THOUSANDS/ΜL (ref 0.6–4.47)
LYMPHOCYTES NFR BLD AUTO: 38 % (ref 14–44)
MCH RBC QN AUTO: 30 PG (ref 26.8–34.3)
MCHC RBC AUTO-ENTMCNC: 33.5 G/DL (ref 31.4–37.4)
MCV RBC AUTO: 90 FL (ref 82–98)
MONOCYTES # BLD AUTO: 0.64 THOUSAND/ΜL (ref 0.17–1.22)
MONOCYTES NFR BLD AUTO: 10 % (ref 4–12)
NEUTROPHILS # BLD AUTO: 3.29 THOUSANDS/ΜL (ref 1.85–7.62)
NEUTS SEG NFR BLD AUTO: 51 % (ref 43–75)
NRBC BLD AUTO-RTO: 0 /100 WBCS
PLATELET # BLD AUTO: 282 THOUSANDS/UL (ref 149–390)
PMV BLD AUTO: 9.2 FL (ref 8.9–12.7)
RBC # BLD AUTO: 4.83 MILLION/UL (ref 3.81–5.12)
WBC # BLD AUTO: 6.43 THOUSAND/UL (ref 4.31–10.16)

## 2024-08-26 PROCEDURE — 85025 COMPLETE CBC W/AUTO DIFF WBC: CPT

## 2024-08-26 PROCEDURE — 99232 SBSQ HOSP IP/OBS MODERATE 35: CPT | Performed by: STUDENT IN AN ORGANIZED HEALTH CARE EDUCATION/TRAINING PROGRAM

## 2024-08-26 PROCEDURE — 82948 REAGENT STRIP/BLOOD GLUCOSE: CPT

## 2024-08-26 PROCEDURE — 80159 DRUG ASSAY CLOZAPINE: CPT

## 2024-08-26 PROCEDURE — 86140 C-REACTIVE PROTEIN: CPT

## 2024-08-26 PROCEDURE — 82550 ASSAY OF CK (CPK): CPT

## 2024-08-26 RX ADMIN — CARVEDILOL 6.25 MG: 6.25 TABLET, FILM COATED ORAL at 17:11

## 2024-08-26 RX ADMIN — FAMOTIDINE 20 MG: 20 TABLET ORAL at 08:20

## 2024-08-26 RX ADMIN — LORAZEPAM 0.5 MG: 0.5 TABLET ORAL at 08:07

## 2024-08-26 RX ADMIN — Medication 15 ML: at 08:07

## 2024-08-26 RX ADMIN — SENNOSIDES AND DOCUSATE SODIUM 2 TABLET: 8.6; 5 TABLET ORAL at 21:30

## 2024-08-26 RX ADMIN — FLUOXETINE 40 MG: 20 SOLUTION ORAL at 08:08

## 2024-08-26 RX ADMIN — MELATONIN TAB 3 MG 3 MG: 3 TAB at 21:30

## 2024-08-26 RX ADMIN — PRAZOSIN HYDROCHLORIDE 2 MG: 1 CAPSULE ORAL at 21:30

## 2024-08-26 RX ADMIN — CYANOCOBALAMIN TAB 1000 MCG 1000 MCG: 1000 TAB at 08:20

## 2024-08-26 RX ADMIN — POLYETHYLENE GLYCOL 3350 17 G: 17 POWDER, FOR SOLUTION ORAL at 08:20

## 2024-08-26 RX ADMIN — ATORVASTATIN CALCIUM 10 MG: 10 TABLET, FILM COATED ORAL at 08:20

## 2024-08-26 RX ADMIN — CLOZAPINE 500 MG: 100 TABLET ORAL at 08:07

## 2024-08-26 RX ADMIN — LORAZEPAM 0.5 MG: 0.5 TABLET ORAL at 17:11

## 2024-08-26 RX ADMIN — CARVEDILOL 6.25 MG: 6.25 TABLET, FILM COATED ORAL at 08:07

## 2024-08-26 RX ADMIN — FAMOTIDINE 20 MG: 20 TABLET ORAL at 17:11

## 2024-08-26 RX ADMIN — FLUTICASONE FUROATE 1 PUFF: 100 POWDER RESPIRATORY (INHALATION) at 08:21

## 2024-08-26 NOTE — NURSING NOTE
Pt cooperative yet withdrawn to room most of the day. Present for meals and snacks. Medication and meal compliant. Denies SI/HI. Q 7 minute checks maintained.

## 2024-08-26 NOTE — PROGRESS NOTES
08/26/24 0813   Team Meeting   Meeting Type Daily Rounds   Initial Conference Date 08/26/24   Next Conference Date 08/27/24   Team Members Present   Team Members Present Physician;Nurse;;   Physician Team Member Dr Banuelos, JAIR Romero   Nursing Team Member Clarissa   Care Management Team Member Anum   Social Work Team Member Ayse   Patient/Family Present   Patient Present No   Patient's Family Present No     Clozaril 668, doing well, attending groups, discharge pending home.

## 2024-08-26 NOTE — PLAN OF CARE
Problem: Alteration in Thoughts and Perception  Goal: Treatment Goal: Gain control of psychotic behaviors/thinking, reduce/eliminate presenting symptoms and demonstrate improved reality functioning upon discharge  Outcome: Progressing  Goal: Verbalize thoughts and feelings  Description: Interventions:  - Promote a nonjudgmental and trusting relationship with the patient through active listening and therapeutic communication  - Assess patient's level of functioning, behavior and potential for risk  - Engage patient in 1 on 1 interactions  - Encourage patient to express fears, feelings, frustrations, and discuss symptoms    - [place] patient to reality, help patient recognize reality-based thinking   - Administer medications as ordered and assess for potential side effects  - Provide the patient education related to the signs and symptoms of the illness and desired effects of prescribed medications  Outcome: Progressing  Goal: Refrain from acting on delusional thinking/internal stimuli  Description: Interventions:  - Monitor patient closely, per order   - Utilize least restrictive measures   - Set reasonable limits, give positive feedback for acceptable   - Administer medications as ordered and monitor of potential side effects  Outcome: Progressing  Goal: Agree to be compliant with medication regime, as prescribed and report medication side effects  Description: Interventions:  - Offer appropriate PRN medication and supervise ingestion; conduct AIMS, as needed   Outcome: Progressing  Goal: Attend and participate in unit activities, including therapeutic, recreational, and educational groups  Description: Interventions:  -Encourage Visitation and family involvement in care  Outcome: Progressing  Goal: Recognize dysfunctional thoughts, communicate reality-based thoughts at the time of discharge  Description: Interventions:  - Provide medication and psycho-education to assist patient in compliance and developing  insight into his/her illness   Outcome: Progressing  Goal: Complete daily ADLs, including personal hygiene independently, as able  Description: Interventions:  - Observe, teach, and assist patient with ADLS  - Monitor and promote a balance of rest/activity, with adequate nutrition and elimination   Outcome: Progressing     Problem: Risk for Self Injury/Neglect  Goal: Treatment Goal: Remain safe during length of stay, learn and adopt new coping skills, and be free of self-injurious ideation, impulses and acts at the time of discharge  Outcome: Progressing  Goal: Verbalize thoughts and feelings  Description: Interventions:  - Assess and re-assess patient's lethality and potential for self-injury  - Engage patient in 1:1 interactions, daily, for a minimum of 15 minutes  - Encourage patient to express feelings, fears, frustrations, hopes  - Establish rapport/trust with patient   Outcome: Progressing  Goal: Refrain from harming self  Description: Interventions:  - Monitor patient closely, per order  - Develop a trusting relationship  - Supervise medication ingestion, monitor effects and side effects   Outcome: Progressing  Goal: Attend and participate in unit activities, including therapeutic, recreational, and educational groups  Description: Interventions:  - Provide therapeutic and educational activities daily, encourage attendance and participation, and document same in the medical record  - Obtain collateral information, encourage visitation and family involvement in care   Outcome: Progressing  Goal: Recognize maladaptive responses and adopt new coping mechanisms  Outcome: Progressing     Problem: Depression  Goal: Treatment Goal: Demonstrate behavioral control of depressive symptoms, verbalize feelings of improved mood/affect, and adopt new coping skills prior to discharge  Outcome: Progressing  Goal: Verbalize thoughts and feelings  Description: Interventions:  - Assess and re-assess patient's level of risk   -  Engage patient in 1:1 interactions, daily, for a minimum of 15 minutes   - Encourage patient to express feelings, fears, frustrations, hopes   Outcome: Progressing  Goal: Refrain from isolation  Description: Interventions:  - Develop a trusting relationship   - Encourage socialization   Outcome: Progressing  Goal: Refrain from self-neglect  Outcome: Progressing  Goal: Attend and participate in unit activities, including therapeutic, recreational, and educational groups  Description: Interventions:  - Provide therapeutic and educational activities daily, encourage attendance and participation, and document same in the medical record   Outcome: Progressing     Problem: Anxiety  Goal: Anxiety is at manageable level  Description: Interventions:  - Assess and monitor patient's anxiety level.   - Monitor for signs and symptoms (heart palpitations, chest pain, shortness of breath, headaches, nausea, feeling jumpy, restlessness, irritable, apprehensive).   - Collaborate with interdisciplinary team and initiate plan and interventions as ordered.  - [place] patient to unit/surroundings  - Explain treatment plan  - Encourage participation in care  - Encourage verbalization of concerns/fears  - Identify coping mechanisms  - Assist in developing anxiety-reducing skills  - Administer/offer alternative therapies  - Limit or eliminate stimulants  Outcome: Progressing     Problem: Potential for Falls  Goal: Patient will remain free of falls  Description: INTERVENTIONS:  - Educate patient on patient safety including physical limitations  - Instruct patient to call for assistance with activity   - Consult OT/PT to assist with strengthening/mobility   - Keep Call bell within reach  - Keep bed low and locked with side rails adjusted as appropriate  - Keep care items and personal belongings within reach  - Initiate and maintain comfort rounds  - Offer Toileting every 2 Hours, in advance of need  - Initiate/Maintain bed and chair alarm  -  Obtain necessary fall risk management equipment: walker, wheelchair  - Apply yellow socks and bracelet for high fall risk patients  - Patient moved to room near nurses station  Outcome: Progressing

## 2024-08-26 NOTE — NURSING NOTE
Pt guarded but accepted all po meds except MVI.  VSS.  Good appetite and steady gait.  Withdrawn, did not attend group.  Preoccupied but denied AH.  Monitored for safety and support.

## 2024-08-26 NOTE — PROGRESS NOTES
"Progress Note - Behavioral Health     Meli Nowak 59 y.o. female MRN: [id number]   Unit/Bed#: [facility] 651-01 Encounter: [id number]    Behavior over the last 24 hours: unchanged.     Meli is seen today for psychiatric follow up. Per nursing notes, patient has been doing well over the weekend, withdrawn however cooperative, med and meal compliant.  Patient remains in behavioral control. No psych prns in last 24 hours.     Today Meli is seen laying in bed appearing withdrawn/isolative to room. When asked about depression and anxiety she notes \"no, not really at this time. My weekend was fine.\" She responds that she is unsure about paranoia then says \"I don't know, I don't think so right now\", but does appear internally preoccupied, often staring to the left of this writer. Denies hallucinations at present time and notes that the medications are working. Reports she is trying to go to more groups, notes adequate energy but is often seen in her room. Denies suicidal and homicidal ideations. Reports she was told she needs to get enough sleep and take her medications, to which she reports she is focused on this. Notes she is tired today but denies issues with sleep. Encouraged to go to groups.    Denies medication side effects and concerns/questions at this time. Discharge disposition ongoing. Clozapine level 668.    Sleep:  adequate   Appetite:  adequate  Medication side effects: No   ROS: all other systems are negative    Mental Status Evaluation:    Appearance:  marginal hygiene, wearing hospital clothes, looks stated age   Behavior:  cooperative, calm, bizarre, guarded, limited eye contact   Speech:  normal rate and volume   Mood:  euthymic, but \"tired\"   Affect:  constricted   Thought Process:  goal directed, linear   Associations: concrete associations   Thought Content:  some paranoia, some negative thoughts   Perceptual Disturbances: denies auditory hallucinations when asked, denies visual hallucinations when " asked, appears preoccupied   Risk Potential: Suicidal ideation - None at present  Homicidal ideation - None at present  Potential for aggression - No   Sensorium:  oriented to person, place, and time/date   Memory:  recent and remote memory grossly intact   Consciousness:  alert and awake   Attention/Concentration: attention span and concentration are age appropriate   Insight:  limited   Judgment: limited   Gait/Station: Seen in bed   Motor Activity: no abnormal movements     Vital signs in last 24 hours:    Temp:  [97.5 °F (36.4 °C)] 97.5 °F (36.4 °C)  HR:  [74-84] 84  Resp:  [15-17] 16  BP: (133-147)/(65-74) 147/74    Laboratory results: I have personally reviewed all pertinent laboratory/tests results    Results from the past 24 hours:   Recent Results (from the past 24 hour(s))   Fingerstick Glucose (POCT)    Collection Time: 08/25/24  3:35 PM   Result Value Ref Range    POC Glucose 117 65 - 140 mg/dl   Clozapine-[facility]    Collection Time: 08/26/24  5:03 AM   Result Value Ref Range    Clozapine Lvl 668 350 - 900 ng/mL   CBC and differential    Collection Time: 08/26/24  5:42 AM   Result Value Ref Range    WBC 6.43 4.31 - 10.16 Thousand/uL    RBC 4.83 3.81 - 5.12 Million/uL    Hemoglobin 14.5 11.5 - 15.4 g/dL    Hematocrit 43.3 34.8 - 46.1 %    MCV 90 82 - 98 fL    MCH 30.0 26.8 - 34.3 pg    MCHC 33.5 31.4 - 37.4 g/dL    RDW 15.1 11.6 - 15.1 %    MPV 9.2 8.9 - 12.7 fL    Platelets 282 149 - 390 Thousands/uL    nRBC 0 /100 WBCs    Segmented % 51 43 - 75 %    Immature Grans % 0 0 - 2 %    Lymphocytes % 38 14 - 44 %    Monocytes % 10 4 - 12 %    Eosinophils Relative 0 0 - 6 %    Basophils Relative 1 0 - 1 %    Absolute Neutrophils 3.29 1.85 - 7.62 Thousands/µL    Absolute Immature Grans 0.02 0.00 - 0.20 Thousand/uL    Absolute Lymphocytes 2.42 0.60 - 4.47 Thousands/µL    Absolute Monocytes 0.64 0.17 - 1.22 Thousand/µL    Eosinophils Absolute 0.00 0.00 - 0.61 Thousand/µL    Basophils Absolute 0.06 0.00 - 0.10  Thousands/µL   C-reactive protein    Collection Time: 08/26/24  5:42 AM   Result Value Ref Range    CRP 3.6 (H) <3.0 mg/L   CK    Collection Time: 08/26/24  5:42 AM   Result Value Ref Range    Total CK 18 (L) 26 - 192 U/L   Fingerstick Glucose (POCT)    Collection Time: 08/26/24  7:46 AM   Result Value Ref Range    POC Glucose 103 65 - 140 mg/dl       Progress Toward Goals: progressing, adequate energy/sleep, some paranoia and negative thoughts, denies SI/HI/AVH, appears preoccupied but denies psych symptoms at present time when asked. Attends some groups. Calm, bizarre, cooperative.    Assessment & Plan   Principal Problem:    Schizoaffective disorder, bipolar type (HCC)  Active Problems:    Medical clearance for psychiatric admission    Type 2 diabetes mellitus (HCC)    Obesity    Tobacco abuse    Hyperlipidemia    Esophageal reflux    Essential (primary) hypertension    Vitamin B12 deficiency    Hypoxia    Ambulatory dysfunction    Mild protein-calorie malnutrition (HCC)      Recommended Treatment:     Planned medication and treatment changes:    All current active medications have been reviewed  Encourage group therapy, milieu therapy and occupational therapy  Behavioral Health checks every 7 minutes  Continue current medications:  Clozapine level 668 on 08/26/2024, denies medication side effects at this time.  Discharge disposition ongoing.    Current Facility-Administered Medications   Medication Dose Route Frequency Provider Last Rate    acetaminophen  650 mg Oral Q4H PRN JOSE ELIAS Figueroa      acetaminophen  650 mg Oral Q4H PRN JOSE ELIAS Figueroa      acetaminophen  975 mg Oral Q6H PRN JOSE ELIAS Figueroa      aluminum-magnesium hydroxide-simethicone  30 mL Oral Q4H PRN JOSE ELIAS Puri      atorvastatin  10 mg Oral Daily JOSE ELIAS Figueroa      bisacodyl  10 mg Rectal Daily PRN JOSE ELIAS Figueroa      carvedilol  6.25 mg Oral BID With Meals JOSE ELIAS Figueroa      cloNIDine  0.1  mg Transdermal Weekly Marie Ziegler, JOSE ELIAS      cloZAPine  500 mg Oral Daily Marie Ziegler, CRVALE      cyanocobalamin  1,000 mcg Oral Daily Marie Ziegler, CRVALE      famotidine  20 mg Oral BID Shan Fitzgerald DO      FLUoxetine  40 mg Oral Daily Dereje Banuelos MD      fluticasone  1 puff Inhalation Daily Marie Ziegler, JOSE ELIAS      hydrOXYzine HCL  25 mg Oral Q6H PRN Max 4/day Marie Ziegler, CRVALE      hydrOXYzine HCL  50 mg Oral Q6H PRN Max 4/day Marie Ziegler, JOSE ELIAS      ipratropium-albuterol  3 mL Nebulization Q4H PRN Darin Lawton MD      LORazepam  0.5 mg Oral BID Marie Ziegler, JOSE ELIAS      Or    LORazepam  0.5 mg Intramuscular BID Marie Ziegler, JOSE ELIAS      LORazepam  1 mg Intramuscular Q6H PRN Max 3/day Marie Ziegler, JOSE ELIAS      LORazepam  1 mg Oral Q6H PRN Max 3/day Marie Ziegler, JOSE ELIAS      melatonin  3 mg Oral HS Marie Ziegler, JOSE ELIAS      Multivitamin  15 mL Oral Daily Marie Ziegler, JOSE ELIAS      nicotine  1 patch Transdermal Daily JOSE ELIAS Figueroa      OLANZapine  2.5 mg Oral Q6H PRN Dereje Banuelos MD      Or    OLANZapine  2.5 mg Intramuscular Q6H PRN Dereje Banuelos MD      OLANZapine  5 mg Oral Q6H PRN Dereje Banuelos MD      Or    OLANZapine  5 mg Intramuscular Q6H PRN Dereje Banuelos MD      ondansetron  4 mg Oral Q6H PRN Darin Lawton MD      polyethylene glycol  17 g Oral Daily PRN Marie Ziegler, JOSE ELIAS      polyethylene glycol  17 g Oral Daily JOSE ELIAS Ortega      prazosin  2 mg Oral HS Maire Ziegler, JOSE ELIAS      senna-docusate sodium  2 tablet Oral HS Rehana Borrego PA-C      traZODone  50 mg Oral HS PRN JOSE ELIAS Figueroa       Risks / Benefits of Treatment:    Risks, benefits, and possible side effects of medications explained to patient and patient verbalizes understanding and agreement for treatment.    Counseling / Coordination of Care:    Patient's progress discussed with staff in treatment team meeting.  Medications, treatment progress and treatment  plan reviewed with patient.  Medication education provided to patient.  Educated on importance of medication and treatment compliance.  Group attendance encouraged.    Lwarence Martinez PA-C 08/26/24

## 2024-08-27 LAB
GLUCOSE SERPL-MCNC: 116 MG/DL (ref 65–140)
GLUCOSE SERPL-MCNC: 127 MG/DL (ref 65–140)

## 2024-08-27 PROCEDURE — 99232 SBSQ HOSP IP/OBS MODERATE 35: CPT | Performed by: STUDENT IN AN ORGANIZED HEALTH CARE EDUCATION/TRAINING PROGRAM

## 2024-08-27 PROCEDURE — 82948 REAGENT STRIP/BLOOD GLUCOSE: CPT

## 2024-08-27 RX ADMIN — Medication 15 ML: at 08:12

## 2024-08-27 RX ADMIN — FLUOXETINE 40 MG: 20 SOLUTION ORAL at 08:13

## 2024-08-27 RX ADMIN — SENNOSIDES AND DOCUSATE SODIUM 2 TABLET: 8.6; 5 TABLET ORAL at 21:18

## 2024-08-27 RX ADMIN — CLOZAPINE 500 MG: 100 TABLET ORAL at 08:12

## 2024-08-27 RX ADMIN — ATORVASTATIN CALCIUM 10 MG: 10 TABLET, FILM COATED ORAL at 08:13

## 2024-08-27 RX ADMIN — MELATONIN TAB 3 MG 3 MG: 3 TAB at 21:19

## 2024-08-27 RX ADMIN — CARVEDILOL 6.25 MG: 6.25 TABLET, FILM COATED ORAL at 17:00

## 2024-08-27 RX ADMIN — FAMOTIDINE 20 MG: 20 TABLET ORAL at 17:00

## 2024-08-27 RX ADMIN — POLYETHYLENE GLYCOL 3350 17 G: 17 POWDER, FOR SOLUTION ORAL at 08:13

## 2024-08-27 RX ADMIN — PRAZOSIN HYDROCHLORIDE 2 MG: 1 CAPSULE ORAL at 21:18

## 2024-08-27 RX ADMIN — FAMOTIDINE 20 MG: 20 TABLET ORAL at 08:13

## 2024-08-27 RX ADMIN — CARVEDILOL 6.25 MG: 6.25 TABLET, FILM COATED ORAL at 08:13

## 2024-08-27 RX ADMIN — CYANOCOBALAMIN TAB 1000 MCG 1000 MCG: 1000 TAB at 08:12

## 2024-08-27 RX ADMIN — FLUTICASONE FUROATE 1 PUFF: 100 POWDER RESPIRATORY (INHALATION) at 08:12

## 2024-08-27 RX ADMIN — LORAZEPAM 0.5 MG: 0.5 TABLET ORAL at 08:12

## 2024-08-27 RX ADMIN — LORAZEPAM 0.5 MG: 0.5 TABLET ORAL at 17:00

## 2024-08-27 NOTE — TREATMENT PLAN
TREATMENT PLAN REVIEW - Behavioral Health Meli Nowak 59 y.o. 1965 female MRN: [id number]    [facility] 6B [facility] Room / Bed: [facility] 651/[facility] 651-01 Encounter: [id number]          Admit Date/Time:  7/23/2024  1:27 PM    Treatment Team:   MD Clarissa Carter, DEIDRE Ward, MSW    Diagnosis: Principal Problem:    Schizoaffective disorder, bipolar type (HCC)  Active Problems:    Medical clearance for psychiatric admission    Type 2 diabetes mellitus (HCC)    Obesity    Tobacco abuse    Hyperlipidemia    Esophageal reflux    Essential (primary) hypertension    Vitamin B12 deficiency    Hypoxia    Ambulatory dysfunction    Mild protein-calorie malnutrition (HCC)      Patient Strengths/Assets: communication skills, acces to support system in community    Patient Barriers/Limitations: difficulty adapting, limited family ties, noncompliant with medication, noncompliant with treatment, patient is on an involuntary commitment, poor reasoning ability, resistance to treatment    Short Term Goals: decrease in depressive symptoms, decrease in anxiety symptoms, decrease in paranoid thoughts, decrease in psychotic symptoms, ability to stay safe on the unit, ability to stay free of restraints, improvement in ability to express basic needs, improvement in insight, improvement in reality testing, improvement in self care, sleep improvement, mood stabilization, increase in group attendance, increase in socialization with peers on the unit    Long Term Goals: improvement in depression, improvement in anxiety, stabilization of mood, free of suicidal thoughts, free of homicidal thoughts, resolution of psychotic symptoms, improvement in reality testing, improved insight, able to express basic needs, acceptance of need for psychiatric treatment, acceptance of need for psychiatric follow up  after discharge, adequate self care, adequate oral intake, stable living arrangements upon discharge    Progress Towards Goals: continue psychiatric medications as prescribed    Recommended Treatment: medication management, patient medication education, group therapy, milieu therapy, continued Behavioral Health psychiatric evaluation/assessment process    Treatment Frequency: daily medication monitoring, group and milieu therapy daily, monitoring through interdisciplinary rounds, monitoring through weekly patient care conferences    Expected Discharge Date:  14 days    Discharge Plan: referral for outpatient medication management with a psychiatrist, referral for outpatient psychotherapy, return to previous living arrangement    Treatment Plan Created/Updated By: Dereje Banuelos MD

## 2024-08-27 NOTE — PROGRESS NOTES
"Progress Note - Behavioral Health   Meli Nowak 59 y.o. female MRN: [id number]  Unit/Bed#: [facility] 651-01 Encounter: [id number]    Patient was seen today for continuation of care, records reviewed and patient was discussed with the morning case review team.    Meli was seen today for psychiatric follow-up.  On assessment today, Meli was withdrawn and isolated.  Currently lying in bed, patient states \" I know I am not dead because I woke up, I have to keep telling myself that\" when questioned further, patient unwilling to elaborate and stated \" You wouldn't believe me if I told you\".  Still paranoid with noted internal preoccupation, patient is currently compliant with Clozaril, level obtained on 8/26/2024 at 668 .MOO still implemented due to intermittent compliance, patient tolerating p.o. at this time with no adverse effects.  Encouraged to be more social with peers, patient is making slow improvements toward socialization.  Sleep and appetite reported as well.  No medication change be made at this time.  Tentative discharge ongoing as patient continues to slowly approach her baseline.    Meli denies acute suicidal/self-harm ideation/intent/plan upon direct inquiry at this time.  Meli remains behaviorally appropriate, no agitation or aggression noted on exam or in report.   Impulse control is intact.  Meli remains adherent to her current psychotropic medication regimen and denies any side effects from medications, as well as none noted on exam.    Vitals:  Vitals:    08/27/24 0750   BP: 123/69   Pulse: 89   Resp: 19   Temp: 97.7 °F (36.5 °C)   SpO2: 90%       Laboratory Results:  I have personally reviewed all pertinent laboratory/tests results.  Labs in last 72 hours:   Recent Labs     08/26/24  0542   WBC 6.43   RBC 4.83   HGB 14.5   HCT 43.3      RDW 15.1   NEUTROABS 3.29     Clozapine:   Lab Results   Component Value Date    CLOZAPINE 668 08/26/2024       Psychiatric Review of Systems:  Behavior over the " last 24 hours:  unchanged.   Sleep: good  Appetite: good  Medication side effects: none  ROS: no complaints, denies shortness of breath or chest pain and all other systems are negative for acute changes    Mental Status Evaluation:  Appearance:  disheveled, marginal hygiene, dressed in hospital attire   Behavior:  cooperative, bizarre, guarded   Speech:  soft   Mood:  less irritable   Affect:  constricted   Thought Process:  disorganized, illogical, concrete   Thought Content:  paranoid ideation, ruminating thoughts   Perceptual Disturbances: appears preoccupied, talks to self at times, denies when asked, but talks to self at times   Risk Potential: Suicidal ideation - None  Homicidal ideation - None  Potential for aggression - No   Memory:  recent and remote memory grossly intact   Sensorium  person, place, and time/date      Consciousness:  alert and awake   Attention: attention span and concentration are age appropriate   Insight:  limited   Judgment: limited   Gait/Station: slow gait   Motor Activity: no abnormal movements   Progress Toward Goals:   Meli is progressing towards goals of inpatient psychiatric treatment by continued medication compliance and is attending therapeutic modalities on the milieu. However, the patient continues to require inpatient psychiatric hospitalization for continued medication management and titration to optimize symptom reduction, improve sleep hygiene, and demonstrate adequate self-care.    Assessment & Plan   Principal Problem:    Schizoaffective disorder, bipolar type (HCC)  Active Problems:    Medical clearance for psychiatric admission    Type 2 diabetes mellitus (HCC)    Obesity    Tobacco abuse    Hyperlipidemia    Esophageal reflux    Essential (primary) hypertension    Vitamin B12 deficiency    Hypoxia    Ambulatory dysfunction    Mild protein-calorie malnutrition (HCC)      Recommended Treatment: Treatment plan and medication changes discussed and per the attending  physician the plan is:    1.Continue with group therapy, milieu therapy and occupational therapy  2.Behavioral Health checks every 7 minutes  3.Continue frequent safety checks and vitals per unit protocol  4.Continue with [facility] medical management as indicated  5.Continue with current medication regimen  6.Will review labs in the a.m.  7.Disposition Planning: Discharge planning and efforts remain ongoing    Behavioral Health Medications: all current active meds have been reviewed and continue current psychiatric medications.  Current Facility-Administered Medications   Medication Dose Route Frequency Provider Last Rate    acetaminophen  650 mg Oral Q4H PRN Marie Ziegler, CHRISTOPHERNP      acetaminophen  650 mg Oral Q4H PRN Marie Ziegler, CRNP      acetaminophen  975 mg Oral Q6H PRN Marie Ziegler, JOSE ELIAS      aluminum-magnesium hydroxide-simethicone  30 mL Oral Q4H PRN Parris Bolanos, JOSE ELIAS      atorvastatin  10 mg Oral Daily Marie Ziegler, CHRISTOPHERNP      bisacodyl  10 mg Rectal Daily PRN Marie Ziegler, CHRISTOPHERNP      carvedilol  6.25 mg Oral BID With Meals Marie Ziegler, JOSE ELIAS      cloNIDine  0.1 mg Transdermal Weekly Marie Ziegler, CHRISTOPHERNP      cloZAPine  500 mg Oral Daily Marie Ziegler, CRNP      cyanocobalamin  1,000 mcg Oral Daily Marie Ziegler, CRNP      famotidine  20 mg Oral BID Shan Fitzgerald,       FLUoxetine  40 mg Oral Daily Dereje Banuelos MD      fluticasone  1 puff Inhalation Daily JOSE ELIAS Figueroa      hydrOXYzine HCL  25 mg Oral Q6H PRN Max 4/day Marie Ziegler, JOSE ELIAS      hydrOXYzine HCL  50 mg Oral Q6H PRN Max 4/day JOSE ELIAS Figueroa      ipratropium-albuterol  3 mL Nebulization Q4H PRN Darin Lawton MD      LORazepam  0.5 mg Oral BID JOSE ELIAS Figueroa      Or    LORazepam  0.5 mg Intramuscular BID Marie Ziegler, JOSE ELIAS      LORazepam  1 mg Intramuscular Q6H PRN Max 3/day Marie Ziegler, JOSE ELIAS      LORazepam  1 mg Oral Q6H PRN Max 3/day JOSE ELIAS Figueroa      melatonin  3 mg Oral  HS JOSE ELIAS Figueroa      Multivitamin  15 mL Oral Daily JOSE ELIAS Figueroa      nicotine  1 patch Transdermal Daily JOSE ELIAS Figueroa      OLANZapine  2.5 mg Oral Q6H PRN Dereje Banuelos MD      Or    OLANZapine  2.5 mg Intramuscular Q6H PRN Dereje Banuelos MD      OLANZapine  5 mg Oral Q6H PRN Dereje Banuelos MD      Or    OLANZapine  5 mg Intramuscular Q6H PRN Dereje Banuelos MD      ondansetron  4 mg Oral Q6H PRN Darin Lawton MD      polyethylene glycol  17 g Oral Daily PRN Marie Ziegler, JOSE ELIAS      polyethylene glycol  17 g Oral Daily Kristenabdoulaye TomasJOSE ELIAS Weller      prazosin  2 mg Oral HS JOSE ELIAS Figueroa      senna-docusate sodium  2 tablet Oral HS Rehana Borrego PA-C      traZODone  50 mg Oral HS PRN JOSE ELIAS Figueroa         Risks / Benefits of Treatment:  Risks, benefits, and possible side effects of medications explained to patient and patient verbalizes understanding and agreement for treatment.    Counseling / Coordination of Care:  Patient's progress reviewed with nursing staff.  Medications, treatment progress and treatment plan reviewed with patient.  Supportive counseling provided to the patient.    Total floor/unit time spent today 25 minutes. Greater than 50% of total time was spent with the patient and / or family counseling and / or coordination of care. A description of the counseling / coordination of care: medication education, treatment plan, supportive therapy.    This note was completed in part utilizing Dragon dictation Software. Grammatical, translation, syntax errors, random word insertions, spelling mistakes, and incomplete sentences may be an occasional consequence of this system secondary to software limitations with voice recognition, ambient noise, and hardware issues. If you have any questions or concerns about the content, text, or information contained within the body of this dictation, please contact the provider for clarification

## 2024-08-27 NOTE — PLAN OF CARE
Problem: Alteration in Thoughts and Perception  Goal: Treatment Goal: Gain control of psychotic behaviors/thinking, reduce/eliminate presenting symptoms and demonstrate improved reality functioning upon discharge  Outcome: Progressing  Goal: Verbalize thoughts and feelings  Description: Interventions:  - Promote a nonjudgmental and trusting relationship with the patient through active listening and therapeutic communication  - Assess patient's level of functioning, behavior and potential for risk  - Engage patient in 1 on 1 interactions  - Encourage patient to express fears, feelings, frustrations, and discuss symptoms    - [place] patient to reality, help patient recognize reality-based thinking   - Administer medications as ordered and assess for potential side effects  - Provide the patient education related to the signs and symptoms of the illness and desired effects of prescribed medications  Outcome: Progressing  Goal: Refrain from acting on delusional thinking/internal stimuli  Description: Interventions:  - Monitor patient closely, per order   - Utilize least restrictive measures   - Set reasonable limits, give positive feedback for acceptable   - Administer medications as ordered and monitor of potential side effects  Outcome: Progressing  Goal: Agree to be compliant with medication regime, as prescribed and report medication side effects  Description: Interventions:  - Offer appropriate PRN medication and supervise ingestion; conduct AIMS, as needed   Outcome: Progressing  Goal: Attend and participate in unit activities, including therapeutic, recreational, and educational groups  Description: Interventions:  -Encourage Visitation and family involvement in care  Outcome: Progressing  Goal: Recognize dysfunctional thoughts, communicate reality-based thoughts at the time of discharge  Description: Interventions:  - Provide medication and psycho-education to assist patient in compliance and developing  insight into his/her illness   Outcome: Progressing  Goal: Complete daily ADLs, including personal hygiene independently, as able  Description: Interventions:  - Observe, teach, and assist patient with ADLS  - Monitor and promote a balance of rest/activity, with adequate nutrition and elimination   Outcome: Progressing     Problem: Risk for Self Injury/Neglect  Goal: Treatment Goal: Remain safe during length of stay, learn and adopt new coping skills, and be free of self-injurious ideation, impulses and acts at the time of discharge  Outcome: Progressing  Goal: Verbalize thoughts and feelings  Description: Interventions:  - Assess and re-assess patient's lethality and potential for self-injury  - Engage patient in 1:1 interactions, daily, for a minimum of 15 minutes  - Encourage patient to express feelings, fears, frustrations, hopes  - Establish rapport/trust with patient   Outcome: Progressing  Goal: Refrain from harming self  Description: Interventions:  - Monitor patient closely, per order  - Develop a trusting relationship  - Supervise medication ingestion, monitor effects and side effects   Outcome: Progressing  Goal: Attend and participate in unit activities, including therapeutic, recreational, and educational groups  Description: Interventions:  - Provide therapeutic and educational activities daily, encourage attendance and participation, and document same in the medical record  - Obtain collateral information, encourage visitation and family involvement in care   Outcome: Progressing  Goal: Recognize maladaptive responses and adopt new coping mechanisms  Outcome: Progressing     Problem: Depression  Goal: Treatment Goal: Demonstrate behavioral control of depressive symptoms, verbalize feelings of improved mood/affect, and adopt new coping skills prior to discharge  Outcome: Progressing  Goal: Verbalize thoughts and feelings  Description: Interventions:  - Assess and re-assess patient's level of risk   -  Engage patient in 1:1 interactions, daily, for a minimum of 15 minutes   - Encourage patient to express feelings, fears, frustrations, hopes   Outcome: Progressing  Goal: Refrain from isolation  Description: Interventions:  - Develop a trusting relationship   - Encourage socialization   Outcome: Progressing  Goal: Refrain from self-neglect  Outcome: Progressing  Goal: Attend and participate in unit activities, including therapeutic, recreational, and educational groups  Description: Interventions:  - Provide therapeutic and educational activities daily, encourage attendance and participation, and document same in the medical record   Outcome: Progressing

## 2024-08-27 NOTE — TREATMENT TEAM
08/27/24 1535   Team Meeting   Meeting Type Tx Team Meeting   Initial Conference Date 08/27/24   Team Members Present   Team Members Present Physician;Nurse;   Physician Team Member Dr Banuelos   Nursing Team Member Gin   Care Management Team Member Arlette   Patient/Family Present   Patient Present Yes   Patient's Family Present No     Cm reviewed updated treatment plan and goals. Pt politely declined to sign treatment plan. Pt reports in agreement with plan but not wanting to sign anything at this time.

## 2024-08-27 NOTE — CASE MANAGEMENT
CM met with pt, reviewed upcoming visit from [facility] worker Conchita. Pt reports Conchita to visit sometime this week although unsure. Pt reports feeling better but not d/c ready.

## 2024-08-27 NOTE — NURSING NOTE
Pt cooperative withdrawn to room. Medication and meal compliant. Showered without difficulty. Medication and meal compliant. Denies SI/HI. Q 7 minute checks maintained.

## 2024-08-27 NOTE — NURSING NOTE
"Patient was visited by her [facility] worker Conchita this shift in her room before dinner. Patient was resistive to medications but stated \"Conchita told me I have to take all of my pills so I will. Maybe if I can have another ativan I'll go to sleep and then I'll be alright.\" Patient endorses anxiety and states \"I'm not ready to go home yet but maybe soon. I do want to go home just not right now.\" She remains paranoid. Encouraged to inform staff of any needs or concerns.    "

## 2024-08-27 NOTE — PROGRESS NOTES
08/27/24 0800   Team Meeting   Meeting Type Daily Rounds   Initial Conference Date 08/27/24   Next Conference Date 08/28/24   Team Members Present   Team Members Present Physician;Nurse;;   Physician Team Member Dr Banuelos, JAIR Romero   Nursing Team Member Clarissa   Care Management Team Member Anum   Social Work Team Member Ayse   Patient/Family Present   Patient Present No   Patient's Family Present No     Patient has improved, missing her 1:1, Clozaril to stay as is, discharge back home on 9/4 possible

## 2024-08-27 NOTE — NURSING NOTE
Pt guarded but med-compliant.  Pt out for lunch with encouragement.  Good appetite and steady gait.  VSS.  Did not attend group.   at 0800.  Monitored for safety and support.

## 2024-08-28 LAB
GLUCOSE SERPL-MCNC: 110 MG/DL (ref 65–140)
GLUCOSE SERPL-MCNC: 111 MG/DL (ref 65–140)
GLUCOSE SERPL-MCNC: 120 MG/DL (ref 65–140)

## 2024-08-28 PROCEDURE — 82948 REAGENT STRIP/BLOOD GLUCOSE: CPT

## 2024-08-28 PROCEDURE — 99232 SBSQ HOSP IP/OBS MODERATE 35: CPT | Performed by: STUDENT IN AN ORGANIZED HEALTH CARE EDUCATION/TRAINING PROGRAM

## 2024-08-28 RX ORDER — MIDODRINE HYDROCHLORIDE 2.5 MG/1
2.5 TABLET ORAL
Status: DISCONTINUED | OUTPATIENT
Start: 2024-08-28 | End: 2024-09-01

## 2024-08-28 RX ADMIN — CARVEDILOL 6.25 MG: 6.25 TABLET, FILM COATED ORAL at 08:24

## 2024-08-28 RX ADMIN — LORAZEPAM 0.5 MG: 0.5 TABLET ORAL at 17:12

## 2024-08-28 RX ADMIN — FAMOTIDINE 20 MG: 20 TABLET ORAL at 08:25

## 2024-08-28 RX ADMIN — POLYETHYLENE GLYCOL 3350 17 G: 17 POWDER, FOR SOLUTION ORAL at 08:27

## 2024-08-28 RX ADMIN — SENNOSIDES AND DOCUSATE SODIUM 2 TABLET: 8.6; 5 TABLET ORAL at 21:27

## 2024-08-28 RX ADMIN — CLOZAPINE 500 MG: 100 TABLET ORAL at 08:25

## 2024-08-28 RX ADMIN — LORAZEPAM 0.5 MG: 0.5 TABLET ORAL at 08:24

## 2024-08-28 RX ADMIN — ATORVASTATIN CALCIUM 10 MG: 10 TABLET, FILM COATED ORAL at 08:25

## 2024-08-28 RX ADMIN — Medication 15 ML: at 08:26

## 2024-08-28 RX ADMIN — CYANOCOBALAMIN TAB 1000 MCG 1000 MCG: 1000 TAB at 08:25

## 2024-08-28 RX ADMIN — FLUTICASONE FUROATE 1 PUFF: 100 POWDER RESPIRATORY (INHALATION) at 08:26

## 2024-08-28 RX ADMIN — MELATONIN TAB 3 MG 3 MG: 3 TAB at 21:27

## 2024-08-28 RX ADMIN — FLUOXETINE 40 MG: 20 SOLUTION ORAL at 08:26

## 2024-08-28 RX ADMIN — FAMOTIDINE 20 MG: 20 TABLET ORAL at 17:12

## 2024-08-28 RX ADMIN — CARVEDILOL 6.25 MG: 6.25 TABLET, FILM COATED ORAL at 17:12

## 2024-08-28 NOTE — PLAN OF CARE
Problem: Alteration in Thoughts and Perception  Goal: Attend and participate in unit activities, including therapeutic, recreational, and educational groups  Description: Interventions:  -Encourage Visitation and family involvement in care  8/28/2024 1452 by Ayse Chaves LCSW  Outcome: Not Progressing

## 2024-08-28 NOTE — PHYSICAL THERAPY NOTE
Physical TherapyTreatment Note    Patient's Name: Meli Nowak    Admitting Diagnosis  Major depressive disorder, single episode, unspecified [F32.9]  Schizoaffective disorder (HCC) [F25.9]    Problem List  Patient Active Problem List   Diagnosis    Medical clearance for psychiatric admission    Type 2 diabetes mellitus (HCC)    Obesity    Psychosis (HCC)    Tobacco abuse    Hyperlipidemia    Schizoaffective disorder, bipolar type (HCC)    Altered mental status    Agoraphobia with panic disorder    Arthritis    Diastasis recti    Endometrial cancer (HCC)    Esophageal reflux    Hepatic steatosis    HSV-2 infection    Incisional hernia, without obstruction or gangrene    Incontinence of urine in female    Polycystic ovaries    Postmenopausal bleeding    Posttraumatic stress disorder    Right buttock pain    Catatonia    Essential (primary) hypertension    Vitamin B12 deficiency    Hypoxia    Ambulatory dysfunction    Mild protein-calorie malnutrition (HCC)       Past Medical History  Past Medical History:   Diagnosis Date    Cognitive impairment     Diabetes mellitus (HCC)     Essential (primary) hypertension     Psychosis (HCC)        Past Surgical History  Past Surgical History:   Procedure Laterality Date     SECTION      CHOLECYSTECTOMY         Recent Imaging  XR chest portable   Final Result by Mauri Montalvo MD ( [id number])      Hypoventilation without definite infiltrate.      Follow-up study would be useful if symptoms should persist.            Workstation performed: [id number]         XR chest portable   Final Result by Quincy Farooq MD ( [id number])      No focal consolidation, pleural effusion, or pneumothorax.      Resident: Jose Luis Couch I, the attending radiologist, have reviewed the images and agree with the final report above.      Workstation performed: [id number]             Recent Vital Signs  Vitals:    24 1545 24 2032 24 0534 24 0749   BP: 118/69  134/88  119/74   BP Location: Right arm Left arm  Left arm   Pulse: 91 (!) 112  101   Resp: 19 18  18   Temp: 98.1 °F (36.7 °C) 97.7 °F (36.5 °C)  98.6 °F (37 °C)   TempSrc: Temporal Temporal  Temporal   SpO2: 98% 95%  96%   Weight:   93.8 kg (206 lb 12.7 oz)    Height:            08/22/24 1400   PT Last Visit   PT Visit Date 08/22/24   Note Type   Note Type Treatment   Pain Assessment   Pain Assessment Tool 0-10   Pain Score No Pain   Bed Mobility   Supine to Sit 5  Supervision   Sit to Supine 5  Supervision   Transfers   Sit to Stand 5  Supervision   Stand to Sit 5  Supervision   Ambulation/Elevation   Gait pattern Step through pattern;Decreased toe off;Decreased heel strike   Gait Assistance 5  Supervision   Additional items Verbal cues   Assistive Device None   Distance 200ft, 400ft   Balance   Static Sitting Fair +   Dynamic Sitting Fair +   Static Standing Fair   Dynamic Standing Fair   Ambulatory Fair   Endurance Deficit   Endurance Deficit Yes   Endurance Deficit Description reduced from baseline; improving   Activity Tolerance   Activity Tolerance Patient tolerated treatment well   Medical Staff Made Aware spoke to CM   Nurse Made Aware spoke to RN   Assessment   Prognosis Good   Problem List Decreased strength;Decreased range of motion;Decreased endurance;Impaired balance;Decreased mobility;Decreased coordination;Decreased cognition;Impaired judgement;Decreased safety awareness;Impaired sensation;Obesity   Assessment Pt is improved with ambulation and transfers. Continues to be at supervision without a device. Does continue to have decreased safety awareness. WIll recommend that pt be D/C home, appropriate PT f/u will be determined when pt is closer to D/C.   Barriers to Discharge Inaccessible home environment;Decreased caregiver support   Goals   Patient Goals to get home   STG Expiration Date 09/02/24   Short Term Goal #1 see eval note   PT Treatment Day 3   Plan   Treatment/Interventions ADL  retraining;Functional transfer training;LE strengthening/ROM;Elevations;Therapeutic exercise;Endurance training;Patient/family training;Equipment eval/education;Bed mobility;Gait training;Spoke to nursing;Spoke to case management;OT   Progress Progressing toward goals   PT Frequency 1-2x/wk   Discharge Recommendation   Rehab Resource Intensity Level, PT III (Minimum Resource Intensity)   Equipment Recommended   (none)   AM-PAC Basic Mobility Inpatient   Turning in Flat Bed Without Bedrails 4   Lying on Back to Sitting on Edge of Flat Bed Without Bedrails 4   Moving Bed to Chair 4   Standing Up From Chair Using Arms 3   Walk in Room 3   Climb 3-5 Stairs With Railing 3   Basic Mobility Inpatient Raw Score 21   Basic Mobility Standardized Score 45.55   [facility] Highest Level Of Mobility   -HLM Goal 6: Walk 10 steps or more   -HLM Achieved 8: Walk 250 feet ot more   Education   Education Provided Mobility training;Home exercise program;Assistive device   Patient Reinforcement needed   End of Consult   Patient Position at End of Consult Seated edge of bed;All needs within reach         Murray Crawford PT, DPT

## 2024-08-28 NOTE — NURSING NOTE
Patient is alert and withdrawn to room. Report anxiety and depression denies all other psych s/s. Medication compliant and cooperative with care. Safety precautions maintained.
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
## 2024-08-28 NOTE — PROGRESS NOTES
08/28/24 0811   Team Meeting   Meeting Type Daily Rounds   Initial Conference Date 08/28/24   Next Conference Date 08/29/24   Team Members Present   Team Members Present Physician;Nurse;;   Physician Team Member Dr Banuelos, JAIR Romero   Nursing Team Member Dona   Care Management Team Member Anum   Social Work Team Member Ayse   Patient/Family Present   Patient Present No   Patient's Family Present No     ICM visited yesterday, mouth checks, overstimulated in milieu, resistive to medications, remove prazocin, discharge to home possible once stable or may do CRR.

## 2024-08-28 NOTE — PROGRESS NOTES
Progress Note - Behavioral Health   Meli Nowak 59 y.o. female MRN: [id number]  Unit/Bed#: [facility] 651-01 Encounter: [id number]    Patient was seen today for continuation of care, records reviewed and patient was discussed with the morning case review team.    Meli was seen today for psychiatric follow-up.  On assessment today, Meli was more receptive and less paranoid.  Currently lying in bed in fetal position, patient states that she feels unwell and that she has a headache as well as a bad cough.  Dizziness and nausea also reported slim provider made aware, will follow up as well as discontinue Minipress due to orthostatic hypotension.  Visited by long term ICM yesterday, she reports patient is nowhere near baseline and voicing new concerns of patient's paranoia including the belief that ICM was working for the devil.  Clozaril increased to 500 mg daily with level obtained on 8/26/2024 of 668.  We will continue with titration as needed to manage symptoms as patient is also under MOO due to history of noncompliance.  Tentative discharge ongoing as patient may benefit from CRR to maintain medication compliance.    Meli denies acute suicidal/self-harm ideation/intent/plan upon direct inquiry at this time.  Impulse control is intact.  Meli remains adherent to her current psychotropic medication regimen and denies any side effects from medications, as well as none noted on exam.    Vitals:  Vitals:    08/28/24 0749   BP: 119/74   Pulse: 101   Resp: 18   Temp: 98.6 °F (37 °C)   SpO2: 96%       Laboratory Results:  I have personally reviewed all pertinent laboratory/tests results.    Psychiatric Review of Systems:  Behavior over the last 24 hours:  unchanged.   Sleep: good  Appetite: good  Medication side effects: none  ROS: no complaints, denies shortness of breath or chest pain and all other systems are negative for acute changes    Mental Status Evaluation:  Appearance:  disheveled, marginal hygiene, overweight    Behavior:  bizarre, guarded   Speech:  normal rate and volume   Mood:  anxious   Affect:  constricted   Thought Process:  illogical, perseverative   Thought Content:  paranoid ideation   Perceptual Disturbances: appears preoccupied, talks to self at times, denies when asked, but talks to self at times   Risk Potential: Suicidal ideation - None  Homicidal ideation - None  Potential for aggression - No   Memory:  recent and remote memory grossly intact   Sensorium  person, place, and time/date      Consciousness:  alert and awake   Attention: attention span and concentration are age appropriate   Insight:  limited   Judgment: limited   Gait/Station: needs assistive device   Motor Activity: no abnormal movements   Progress Toward Goals:   Meli is progressing towards goals of inpatient psychiatric treatment by continued medication compliance and is attending therapeutic modalities on the milieu. However, the patient continues to require inpatient psychiatric hospitalization for continued medication management and titration to optimize symptom reduction, improve sleep hygiene, and demonstrate adequate self-care.    Assessment & Plan   Principal Problem:    Schizoaffective disorder, bipolar type (HCC)  Active Problems:    Medical clearance for psychiatric admission    Type 2 diabetes mellitus (HCC)    Obesity    Tobacco abuse    Hyperlipidemia    Esophageal reflux    Essential (primary) hypertension    Vitamin B12 deficiency    Hypoxia    Ambulatory dysfunction    Mild protein-calorie malnutrition (HCC)      Recommended Treatment: Treatment plan and medication changes discussed and per the attending physician the plan is:    1.Continue with group therapy, milieu therapy and occupational therapy  2.Behavioral Health checks every 7 minutes  3.Continue frequent safety checks and vitals per unit protocol  4.Continue with SLIM medical management as indicated  5.Continue with current medication regimen  6.Will review labs in  the a.m.  7.Disposition Planning: Discharge planning and efforts remain ongoing    Behavioral Health Medications: all current active meds have been reviewed and continue current psychiatric medications.  Current Facility-Administered Medications   Medication Dose Route Frequency Provider Last Rate    acetaminophen  650 mg Oral Q4H PRN Marie Ziegler, JOSE ELIAS      acetaminophen  650 mg Oral Q4H PRN Marie Ziegler, CHRISTOPHERNP      acetaminophen  975 mg Oral Q6H PRN Marie Ziegler, JOSE ELIAS      aluminum-magnesium hydroxide-simethicone  30 mL Oral Q4H PRN Parris Bolanos, JOSE ELIAS      atorvastatin  10 mg Oral Daily Marie Ziegler, JOSE ELIAS      bisacodyl  10 mg Rectal Daily PRN Marie Ziegler, JOSE ELIAS      carvedilol  6.25 mg Oral BID With Meals Marie Ziegler, JOSE ELIAS      cloNIDine  0.1 mg Transdermal Weekly Marie Ziegler, JOSE EILAS      cloZAPine  500 mg Oral Daily Marie Ziegler, JOSE ELIAS      cyanocobalamin  1,000 mcg Oral Daily Marie Ziegler, JOSE ELIAS      famotidine  20 mg Oral BID Shan Fitzgerald,       FLUoxetine  40 mg Oral Daily Dereje Banuelos MD      fluticasone  1 puff Inhalation Daily JOSE ELIAS Figueroa      hydrOXYzine HCL  25 mg Oral Q6H PRN Max 4/day Marie Ziegler, JOSE ELIAS      hydrOXYzine HCL  50 mg Oral Q6H PRN Max 4/day JOSE ELIAS Figueroa      ipratropium-albuterol  3 mL Nebulization Q4H PRN Darin Lawton MD      LORazepam  0.5 mg Oral BID JOSE ELIAS Figueroa      Or    LORazepam  0.5 mg Intramuscular BID JOSE ELIAS Figueroa      LORazepam  1 mg Intramuscular Q6H PRN Max 3/day JOSE ELIAS Figueroa      LORazepam  1 mg Oral Q6H PRN Max 3/day JOSE ELIAS Figueroa      melatonin  3 mg Oral HS Marie Ziegler, JOSE ELIAS      midodrine  2.5 mg Oral BID AC Kristen Logan, JOSE ELIAS      Multivitamin  15 mL Oral Daily JOSE ELIAS Figueroa      nicotine  1 patch Transdermal Daily JOSE ELIAS Figueroa      OLANZapine  2.5 mg Oral Q6H PRN Dereje Banuelos MD      Or    OLANZapine  2.5 mg Intramuscular Q6H PRN Dereje  MD Vin      OLANZapine  5 mg Oral Q6H PRN Dereje Banuelos MD      Or    OLANZapine  5 mg Intramuscular Q6H PRN Dereje Banuelos MD      ondansetron  4 mg Oral Q6H PRN Darin Lawton MD      polyethylene glycol  17 g Oral Daily PRN JOSE ELIAS Figueroa      polyethylene glycol  17 g Oral Daily Kristenabdoulaye TomasJOSE ELIAS Weller      prazosin  2 mg Oral HS JOSE ELIAS Figueroa      senna-docusate sodium  2 tablet Oral HS Rehana Borrego PA-C      traZODone  50 mg Oral HS PRN JOSE ELIAS Figueroa         Risks / Benefits of Treatment:  Risks, benefits, and possible side effects of medications explained to patient and patient verbalizes understanding and agreement for treatment.    Counseling / Coordination of Care:  Patient's progress reviewed with nursing staff.  Medications, treatment progress and treatment plan reviewed with patient.  Supportive counseling provided to the patient.    Total floor/unit time spent today 25 minutes. Greater than 50% of total time was spent with the patient and / or family counseling and / or coordination of care. A description of the counseling / coordination of care: medication education, treatment plan, supportive therapy.    This note was completed in part utilizing Dragon dictation Software. Grammatical, translation, syntax errors, random word insertions, spelling mistakes, and incomplete sentences may be an occasional consequence of this system secondary to software limitations with voice recognition, ambient noise, and hardware issues. If you have any questions or concerns about the content, text, or information contained within the body of this dictation, please contact the provider for clarification

## 2024-08-28 NOTE — NURSING NOTE
Pt guarded and withdrawn but accepted po meds with encouragement.  VSS.   at 0800.  Did not attend group.  Monitored for safety and support.

## 2024-08-28 NOTE — PLAN OF CARE
Problem: Alteration in Thoughts and Perception  Goal: Attend and participate in unit activities, including therapeutic, recreational, and educational groups  Description: Interventions:  -Encourage Visitation and family involvement in care  Outcome: Not Progressing     Problem: Prexisting or High Potential for Compromised Skin Integrity  Goal: Skin integrity is maintained or improved  Description: INTERVENTIONS:  - Identify patients at risk for skin breakdown  - Assess and monitor skin integrity  - Assess and monitor nutrition and hydration status  - Monitor labs   - Assess for incontinence   - Turn and reposition patient  - Assist with mobility/ambulation  - Relieve pressure over bony prominences  - Avoid friction and shearing  - Provide appropriate hygiene as needed including keeping skin clean and dry  - Evaluate need for skin moisturizer/barrier cream  - Collaborate with interdisciplinary team   - Patient/family teaching  - Consider wound care consult   Outcome: Progressing     Problem: Alteration in Thoughts and Perception  Goal: Verbalize thoughts and feelings  Description: Interventions:  - Promote a nonjudgmental and trusting relationship with the patient through active listening and therapeutic communication  - Assess patient's level of functioning, behavior and potential for risk  - Engage patient in 1 on 1 interactions  - Encourage patient to express fears, feelings, frustrations, and discuss symptoms    - [place] patient to reality, help patient recognize reality-based thinking   - Administer medications as ordered and assess for potential side effects  - Provide the patient education related to the signs and symptoms of the illness and desired effects of prescribed medications  Outcome: Progressing     Problem: Depression  Goal: Verbalize thoughts and feelings  Description: Interventions:  - Assess and re-assess patient's level of risk   - Engage patient in 1:1 interactions, daily, for a minimum of 15  minutes   - Encourage patient to express feelings, fears, frustrations, hopes   Outcome: Progressing     Problem: Anxiety  Goal: Anxiety is at manageable level  Description: Interventions:  - Assess and monitor patient's anxiety level.   - Monitor for signs and symptoms (heart palpitations, chest pain, shortness of breath, headaches, nausea, feeling jumpy, restlessness, irritable, apprehensive).   - Collaborate with interdisciplinary team and initiate plan and interventions as ordered.  - [place] patient to unit/surroundings  - Explain treatment plan  - Encourage participation in care  - Encourage verbalization of concerns/fears  - Identify coping mechanisms  - Assist in developing anxiety-reducing skills  - Administer/offer alternative therapies  - Limit or eliminate stimulants  Outcome: Progressing

## 2024-08-29 ENCOUNTER — APPOINTMENT (INPATIENT)
Dept: RADIOLOGY | Facility: HOSPITAL | Age: 59
DRG: 885 | End: 2024-08-29
Payer: MEDICARE

## 2024-08-29 LAB
GLUCOSE SERPL-MCNC: 108 MG/DL (ref 65–140)
GLUCOSE SERPL-MCNC: 118 MG/DL (ref 65–140)

## 2024-08-29 PROCEDURE — 99232 SBSQ HOSP IP/OBS MODERATE 35: CPT | Performed by: STUDENT IN AN ORGANIZED HEALTH CARE EDUCATION/TRAINING PROGRAM

## 2024-08-29 PROCEDURE — 82948 REAGENT STRIP/BLOOD GLUCOSE: CPT

## 2024-08-29 PROCEDURE — 71045 X-RAY EXAM CHEST 1 VIEW: CPT

## 2024-08-29 RX ORDER — FLUOXETINE 20 MG/5ML
60 SOLUTION ORAL DAILY
Status: DISCONTINUED | OUTPATIENT
Start: 2024-08-30 | End: 2024-08-30

## 2024-08-29 RX ADMIN — Medication 15 ML: at 08:14

## 2024-08-29 RX ADMIN — ATORVASTATIN CALCIUM 10 MG: 10 TABLET, FILM COATED ORAL at 08:13

## 2024-08-29 RX ADMIN — LORAZEPAM 0.5 MG: 0.5 TABLET ORAL at 17:01

## 2024-08-29 RX ADMIN — FLUTICASONE FUROATE 1 PUFF: 100 POWDER RESPIRATORY (INHALATION) at 08:17

## 2024-08-29 RX ADMIN — MIDODRINE HYDROCHLORIDE 2.5 MG: 2.5 TABLET ORAL at 17:01

## 2024-08-29 RX ADMIN — FAMOTIDINE 20 MG: 20 TABLET ORAL at 17:01

## 2024-08-29 RX ADMIN — CARVEDILOL 6.25 MG: 6.25 TABLET, FILM COATED ORAL at 08:13

## 2024-08-29 RX ADMIN — SENNOSIDES AND DOCUSATE SODIUM 2 TABLET: 8.6; 5 TABLET ORAL at 21:25

## 2024-08-29 RX ADMIN — CARVEDILOL 6.25 MG: 6.25 TABLET, FILM COATED ORAL at 17:01

## 2024-08-29 RX ADMIN — CYANOCOBALAMIN TAB 1000 MCG 1000 MCG: 1000 TAB at 08:12

## 2024-08-29 RX ADMIN — CLOZAPINE 500 MG: 100 TABLET ORAL at 08:12

## 2024-08-29 RX ADMIN — LORAZEPAM 0.5 MG: 0.5 TABLET ORAL at 08:12

## 2024-08-29 RX ADMIN — MELATONIN TAB 3 MG 3 MG: 3 TAB at 21:25

## 2024-08-29 RX ADMIN — FAMOTIDINE 20 MG: 20 TABLET ORAL at 08:13

## 2024-08-29 RX ADMIN — FLUOXETINE 40 MG: 20 SOLUTION ORAL at 08:17

## 2024-08-29 RX ADMIN — POLYETHYLENE GLYCOL 3350 17 G: 17 POWDER, FOR SOLUTION ORAL at 08:14

## 2024-08-29 NOTE — PROGRESS NOTES
08/29/24 0824   Team Meeting   Meeting Type Daily Rounds   Initial Conference Date 08/29/24   Next Conference Date 08/30/24   Team Members Present   Team Members Present Physician;Nurse;;   Physician Team Member JAIR Ponce   Nursing Team Member Dona   Care Management Team Member Anum   Social Work Team Member Ayse   Patient/Family Present   Patient Present No   Patient's Family Present No     Visible, bed and chair alarm, reporting being dizzy, sleeping good, increase prozac, may be at a new baseline, may move forward with CRR,

## 2024-08-29 NOTE — PROGRESS NOTES
Pt refuse dot attend group.  When approached pt was lying in bed.  Pt indicated she was not feeling well.  Pt noted she would try to go to groups tomorrow.  Pt did understand the benefits of attending group.  Encouraged to to make needs known and make the effort to attend.       08/29/24 1000   Activity/Group Checklist   Group Other (Comment)  (Community Meeting: group rules and respect)   Attendance Refused

## 2024-08-29 NOTE — PROGRESS NOTES
Progress Note - Behavioral Health   Meli Nowak 59 y.o. female MRN: [id number]  Unit/Bed#: [facility] 651-01 Encounter: [id number]    Patient was seen today for continuation of care, records reviewed and patient was discussed with the morning case review team.    Meli was seen today for psychiatric follow-up.  On assessment today, Meli was seen lying in bed.  Still complaining of bouts of dizziness, patient continues to be followed by [facility] for hypotension.  Chest x-ray also to be ordered secondary to increasing coughing and hypoxia, patient is encouraged to debrief and reach out to staff if symptoms worsen.  Affect blunted with poor eye contact, patient continues to exhibit paranoia although she is more cooperative and less disorganized today.  Depression also voiced, will increase Prozac to 60 mg daily to assist with symptoms and titrate as needed.  Clozaril continued at 500 mg daily for disorganization, illogical behavior and paranoid ideation, weekly labs to be obtained every Monday.  No medication change be made at this time.  Tentative discharge ongoing as patient will need referral to [facility] due to inability to safely function in community.    Meli denies acute suicidal/self-harm ideation/intent/plan upon direct inquiry at this time.  Meli remains behaviorally appropriate, no agitation or aggression noted on exam or in report.  Impulse control is intact.  Meli remains adherent to her current psychotropic medication regimen and denies any side effects from medications, as well as none noted on exam.    Vitals:  Vitals:    08/29/24 0745   BP: 136/64   Pulse: 89   Resp: 16   Temp: 97.8 °F (36.6 °C)   SpO2: 92%       Laboratory Results:  I have personally reviewed all pertinent laboratory/tests results.    Psychiatric Review of Systems:  Behavior over the last 24 hours:  unchanged.   Sleep: good  Appetite: good  Medication side effects: none  ROS:  dizziness , denies shortness of breath or chest pain and all other  systems are negative for acute changes    Mental Status Evaluation:  Appearance:  disheveled, dressed in hospital attire, overweight   Behavior:  cooperative, guarded, responds better to redirection, slow responses   Speech:  normal rate and volume   Mood:  depressed   Affect:  constricted   Thought Process:  illogical, perseverative, negative thinking, concrete   Thought Content:  paranoid ideation, negative thoughts, ruminating thoughts   Perceptual Disturbances: talks to self at times, denies when asked, but talks to self at times   Risk Potential: Suicidal ideation - None  Homicidal ideation - None  Potential for aggression - No   Memory:  recent and remote memory grossly intact   Sensorium  person, place, and time/date      Consciousness:  alert and awake   Attention: attention span and concentration appear shorter than expected for age   Insight:  limited   Judgment: limited   Gait/Station: needs assistive device   Motor Activity: no abnormal movements   Progress Toward Goals:   Meli is progressing towards goals of inpatient psychiatric treatment by continued medication compliance and is attending therapeutic modalities on the milieu. However, the patient continues to require inpatient psychiatric hospitalization for continued medication management and titration to optimize symptom reduction, improve sleep hygiene, and demonstrate adequate self-care.    Assessment & Plan   Principal Problem:    Schizoaffective disorder, bipolar type (HCC)  Active Problems:    Medical clearance for psychiatric admission    Type 2 diabetes mellitus (HCC)    Obesity    Tobacco abuse    Hyperlipidemia    Esophageal reflux    Essential (primary) hypertension    Vitamin B12 deficiency    Hypoxia    Ambulatory dysfunction    Mild protein-calorie malnutrition (HCC)      Recommended Treatment: Treatment plan and medication changes discussed and per the attending physician the plan is:    1.Continue with group therapy, milieu therapy  and occupational therapy  2.Behavioral Health checks every 7 minutes  3.Continue frequent safety checks and vitals per unit protocol  4.Continue with SLIM medical management as indicated  5.Continue with current medication regimen  6.Will review labs in the a.m.  7.Disposition Planning: Discharge planning and efforts remain ongoing    Behavioral Health Medications: all current active meds have been reviewed and continue current psychiatric medications.  Current Facility-Administered Medications   Medication Dose Route Frequency Provider Last Rate    acetaminophen  650 mg Oral Q4H PRN Marie Ziegler, CHRISTOPHERNP      acetaminophen  650 mg Oral Q4H PRN Marie Ziegler, CHRISTOPHERNP      acetaminophen  975 mg Oral Q6H PRN Marie Ziegler, JOSE ELIAS      aluminum-magnesium hydroxide-simethicone  30 mL Oral Q4H PRN Parris Bolanos, JOSE ELIAS      atorvastatin  10 mg Oral Daily Marie Ziegler, JOSE ELIAS      bisacodyl  10 mg Rectal Daily PRN Marie Ziegler, JOSE ELIAS      carvedilol  6.25 mg Oral BID With Meals JOSE ELIAS Figueroa      cloZAPine  500 mg Oral Daily Marie Ziegler, JOSE ELIAS      cyanocobalamin  1,000 mcg Oral Daily Marie Ziegler, JOSE ELIAS      famotidine  20 mg Oral BID Shan Fitzgerald DO      [START ON 8/30/2024] FLUoxetine  60 mg Oral Daily Marie Ziegler, JOSE ELIAS      fluticasone  1 puff Inhalation Daily JOSE ELIAS Figueroa      hydrOXYzine HCL  25 mg Oral Q6H PRN Max 4/day Marie Ziegler, JOSE ELIAS      hydrOXYzine HCL  50 mg Oral Q6H PRN Max 4/day JOSE ELIAS Figueroa      ipratropium-albuterol  3 mL Nebulization Q4H PRN Darin Lawton MD      LORazepam  0.5 mg Oral BID JOSE ELIAS Figueroa      Or    LORazepam  0.5 mg Intramuscular BID Marie Ziegler, JOSE ELIAS      LORazepam  1 mg Intramuscular Q6H PRN Max 3/day Marie Ziegler, JOSE ELIAS      LORazepam  1 mg Oral Q6H PRN Max 3/day JOSE ELIAS Figueroa      melatonin  3 mg Oral HS Marie Ziegler, JOSE ELIAS      midodrine  2.5 mg Oral BID AC JOSE ELIAS Ortega      Multivitamin  15  mL Oral Daily JOSE ELIAS Figueroa      nicotine  1 patch Transdermal Daily JOSE ELIAS Figueroa      OLANZapine  2.5 mg Oral Q6H PRN Dereje Banuelos MD      Or    OLANZapine  2.5 mg Intramuscular Q6H PRN Dereje Banuelos MD      OLANZapine  5 mg Oral Q6H PRN Dereje Banuelos MD      Or    OLANZapine  5 mg Intramuscular Q6H PRN Dereje Banuelos MD      ondansetron  4 mg Oral Q6H PRN Darin Lawton MD      polyethylene glycol  17 g Oral Daily PRN JOSE ELIAS Figueroa      polyethylene glycol  17 g Oral Daily Kristen JOSE ELIAS Meyer      senna-docusate sodium  2 tablet Oral HS Rehana Borrego PA-C      traZODone  50 mg Oral HS PRN JOSE ELIAS Figueroa         Risks / Benefits of Treatment:  Risks, benefits, and possible side effects of medications explained to patient and patient verbalizes understanding and agreement for treatment.    Counseling / Coordination of Care:  Patient's progress reviewed with nursing staff.  Medications, treatment progress and treatment plan reviewed with patient.  Supportive counseling provided to the patient.    Total floor/unit time spent today 25 minutes. Greater than 50% of total time was spent with the patient and / or family counseling and / or coordination of care. A description of the counseling / coordination of care: medication education, treatment plan, supportive therapy.    This note was completed in part utilizing Dragon dictation Software. Grammatical, translation, syntax errors, random word insertions, spelling mistakes, and incomplete sentences may be an occasional consequence of this system secondary to software limitations with voice recognition, ambient noise, and hardware issues. If you have any questions or concerns about the content, text, or information contained within the body of this dictation, please contact the provider for clarification

## 2024-08-29 NOTE — NURSING NOTE
"Patient withdrawn to room this shift. Patient refused dinner. Patient encouraged to come out of room however patient declined and stated \"maybe I will for snack\". Patient cooperative with meds. VSS. Continual safety checks ongoing  "

## 2024-08-29 NOTE — NURSING NOTE
Pt refused breakfast, appetite poor at lunch.  VSS.  Did not attend group.  Pt withdrawn and very resistive to coming out of her room.  Med-compliant with lots of encouragement.  Monitored for safety and support.

## 2024-08-30 LAB — GLUCOSE SERPL-MCNC: 117 MG/DL (ref 65–140)

## 2024-08-30 PROCEDURE — 82948 REAGENT STRIP/BLOOD GLUCOSE: CPT

## 2024-08-30 PROCEDURE — 99232 SBSQ HOSP IP/OBS MODERATE 35: CPT | Performed by: STUDENT IN AN ORGANIZED HEALTH CARE EDUCATION/TRAINING PROGRAM

## 2024-08-30 RX ORDER — FLUOXETINE 20 MG/5ML
80 SOLUTION ORAL DAILY
Status: DISCONTINUED | OUTPATIENT
Start: 2024-08-31 | End: 2024-09-04

## 2024-08-30 RX ADMIN — FAMOTIDINE 20 MG: 20 TABLET ORAL at 08:58

## 2024-08-30 RX ADMIN — CLOZAPINE 500 MG: 100 TABLET ORAL at 08:58

## 2024-08-30 RX ADMIN — LORAZEPAM 0.5 MG: 0.5 TABLET ORAL at 17:24

## 2024-08-30 RX ADMIN — TRAZODONE HYDROCHLORIDE 50 MG: 50 TABLET ORAL at 01:52

## 2024-08-30 RX ADMIN — SENNOSIDES AND DOCUSATE SODIUM 2 TABLET: 8.6; 5 TABLET ORAL at 21:34

## 2024-08-30 RX ADMIN — FAMOTIDINE 20 MG: 20 TABLET ORAL at 17:24

## 2024-08-30 RX ADMIN — POLYETHYLENE GLYCOL 3350 17 G: 17 POWDER, FOR SOLUTION ORAL at 08:58

## 2024-08-30 RX ADMIN — CYANOCOBALAMIN TAB 1000 MCG 1000 MCG: 1000 TAB at 08:58

## 2024-08-30 RX ADMIN — LORAZEPAM 0.5 MG: 0.5 TABLET ORAL at 08:58

## 2024-08-30 RX ADMIN — CARVEDILOL 6.25 MG: 6.25 TABLET, FILM COATED ORAL at 08:27

## 2024-08-30 RX ADMIN — MIDODRINE HYDROCHLORIDE 2.5 MG: 2.5 TABLET ORAL at 17:24

## 2024-08-30 RX ADMIN — ATORVASTATIN CALCIUM 10 MG: 10 TABLET, FILM COATED ORAL at 08:59

## 2024-08-30 RX ADMIN — Medication 15 ML: at 08:59

## 2024-08-30 RX ADMIN — FLUOXETINE 60 MG: 20 SOLUTION ORAL at 08:58

## 2024-08-30 RX ADMIN — FLUTICASONE FUROATE 1 PUFF: 100 POWDER RESPIRATORY (INHALATION) at 08:58

## 2024-08-30 RX ADMIN — MELATONIN TAB 3 MG 3 MG: 3 TAB at 21:34

## 2024-08-30 RX ADMIN — CARVEDILOL 6.25 MG: 6.25 TABLET, FILM COATED ORAL at 17:25

## 2024-08-30 NOTE — PROGRESS NOTES
Progress Note - Behavioral Health   Meli Nowak 59 y.o. female MRN: [id number]  Unit/Bed#: [facility] 651-01 Encounter: [id number]    Patient was seen today for continuation of care, records reviewed and patient was discussed with the morning case review team.    Meli was seen today for psychiatric follow-up.  On assessment today, Meli was less paranoid and more cooperative during interview.  Withdrawn at times, patient does endorse depression.  Prozac to be increased to 80 mg daily starting tomorrow, will also change Clozaril from daily to nightly to assist with daytime sedation.  No bizarre statements during interview, patient also able to verbalize the importance of maintaining current medication regimen to assist with symptoms.  Sleep interrupted, will consider possible addition of schedule trazodone after evaluation of nighttime Clozaril.  Medication regimen tolerated well.  No EPS or adverse affects reported or noted by staff.  Tentative discharge ongoing as patient condition continues to slowly improve, she may benefit from referral to [facility] due to inability to safely function in community and maintain medication compliance.    Meli denies acute suicidal/self-harm ideation/intent/plan upon direct inquiry at this time.  Meli remains behaviorally appropriate, no agitation or aggression noted on exam or in report.  Meli also denies HI/AH/VH, and does not appear overtly manic.   Impulse control is intact.  Meli remains adherent to her current psychotropic medication regimen and denies any side effects from medications, as well as none noted on exam.    Vitals:  Vitals:    08/30/24 0743   BP: 142/62   Pulse: 91   Resp: 18   Temp: 98.1 °F (36.7 °C)   SpO2: 95%       Laboratory Results:  I have personally reviewed all pertinent laboratory/tests results.    Psychiatric Review of Systems:  Behavior over the last 24 hours:  unchanged.   Sleep: improving  Appetite: good  Medication side effects: none  ROS: no complaints,  denies shortness of breath or chest pain and all other systems are negative for acute changes    Mental Status Evaluation:  Appearance:  disheveled, marginal hygiene, overweight   Behavior:  cooperative, calm, bizarre, more redirectable   Speech:  normal rate and volume   Mood:  depressed, anxious   Affect:  increased in range, mood-congruent   Thought Process:  perseverative, impaired abstract reasoning, concrete, more logical, less disorganized   Thought Content:  paranoid ideation, negative thoughts, ruminating thoughts   Perceptual Disturbances: denies auditory hallucinations when asked, does not appear responding to internal stimuli   Risk Potential: Suicidal ideation - None  Homicidal ideation - None  Potential for aggression - No   Memory:  recent and remote memory grossly intact   Sensorium  person, place, and time/date      Consciousness:  alert and awake   Attention: attention span and concentration are age appropriate   Insight:  limited   Judgment: limited   Gait/Station: needs assistive device   Motor Activity: no abnormal movements   Progress Toward Goals:   Meli is progressing towards goals of inpatient psychiatric treatment by continued medication compliance and is attending therapeutic modalities on the milieu. However, the patient continues to require inpatient psychiatric hospitalization for continued medication management and titration to optimize symptom reduction, improve sleep hygiene, and demonstrate adequate self-care.    Assessment & Plan   Principal Problem:    Schizoaffective disorder, bipolar type (HCC)  Active Problems:    Medical clearance for psychiatric admission    Type 2 diabetes mellitus (HCC)    Obesity    Tobacco abuse    Hyperlipidemia    Esophageal reflux    Essential (primary) hypertension    Vitamin B12 deficiency    Hypoxia    Ambulatory dysfunction    Mild protein-calorie malnutrition (HCC)      Recommended Treatment: Treatment plan and medication changes discussed and per  the attending physician the plan is:    1.Continue with group therapy, milieu therapy and occupational therapy  2.Behavioral Health checks every 7 minutes  3.Continue frequent safety checks and vitals per unit protocol  4.Continue with SLIM medical management as indicated  5.Continue with current medication regimen  6.Will review labs in the a.m.  7.Disposition Planning: Discharge planning and efforts remain ongoing    Behavioral Health Medications: all current active meds have been reviewed and continue current psychiatric medications.  Current Facility-Administered Medications   Medication Dose Route Frequency Provider Last Rate    acetaminophen  650 mg Oral Q4H PRN JOSE ELIAS Figueroa      acetaminophen  650 mg Oral Q4H PRN JOSE ELIAS Figueroa      acetaminophen  975 mg Oral Q6H PRN JOSE ELIAS Figueroa      aluminum-magnesium hydroxide-simethicone  30 mL Oral Q4H PRN JOSE ELIAS Puri      atorvastatin  10 mg Oral Daily JOSE ELIAS Figueroa      bisacodyl  10 mg Rectal Daily PRN JOSE ELIAS Figueroa      carvedilol  6.25 mg Oral BID With Meals JOSE ELIAS Figueroa      [START ON 8/31/2024] cloZAPine  500 mg Oral HS JOSE ELIAS Figueroa      cyanocobalamin  1,000 mcg Oral Daily JOSE ELIAS Figueroa      famotidine  20 mg Oral BID Shan Fitzgerald DO      FLUoxetine  60 mg Oral Daily JOSE ELIAS Figueroa      fluticasone  1 puff Inhalation Daily JOSE ELIAS Figueroa      hydrOXYzine HCL  25 mg Oral Q6H PRN Max 4/day JOSE ELIAS Figueroa      hydrOXYzine HCL  50 mg Oral Q6H PRN Max 4/day JOSE ELIAS Figueroa      ipratropium-albuterol  3 mL Nebulization Q4H PRN Darin Lawton MD      LORazepam  0.5 mg Oral BID JOSE ELIAS Figueroa      Or    LORazepam  0.5 mg Intramuscular BID JOSE ELIAS Figueroa      LORazepam  1 mg Intramuscular Q6H PRN Max 3/day JOSE ELIAS Figueroa      LORazepam  1 mg Oral Q6H PRN Max 3/day JOSE ELIAS Figueroa      melatonin  3 mg Oral HS JOSE ELIAS Figueroa       midodrine  2.5 mg Oral BID AC JOSE ELIAS Ortega      Multivitamin  15 mL Oral Daily JOSE ELIAS Figueroa      nicotine  1 patch Transdermal Daily JOSE ELIAS Figueroa      OLANZapine  2.5 mg Oral Q6H PRN Dereje Banuelos MD      Or    OLANZapine  2.5 mg Intramuscular Q6H PRN Dereje Banuelos MD      OLANZapine  5 mg Oral Q6H PRN Dereje Banuelos MD      Or    OLANZapine  5 mg Intramuscular Q6H PRN Dereje Banuelos MD      ondansetron  4 mg Oral Q6H PRN Darin Lawton MD      polyethylene glycol  17 g Oral Daily PRN JOSE ELIAS Figueroa      polyethylene glycol  17 g Oral Daily JOSE ELIAS Ortega      senna-docusate sodium  2 tablet Oral HS Rehana Borrego PA-C      traZODone  50 mg Oral HS PRN JOSE ELIAS Figueroa         Risks / Benefits of Treatment:  Risks, benefits, and possible side effects of medications explained to patient and patient verbalizes understanding and agreement for treatment.    Counseling / Coordination of Care:  Patient's progress reviewed with nursing staff.  Medications, treatment progress and treatment plan reviewed with patient.  Supportive counseling provided to the patient.    Total floor/unit time spent today 25 minutes. Greater than 50% of total time was spent with the patient and / or family counseling and / or coordination of care. A description of the counseling / coordination of care: medication education, treatment plan, supportive therapy.    This note was completed in part utilizing Dragon dictation Software. Grammatical, translation, syntax errors, random word insertions, spelling mistakes, and incomplete sentences may be an occasional consequence of this system secondary to software limitations with voice recognition, ambient noise, and hardware issues. If you have any questions or concerns about the content, text, or information contained within the body of this dictation, please contact the provider for clarification

## 2024-08-30 NOTE — PROGRESS NOTES
08/30/24 0826   Team Meeting   Meeting Type Daily Rounds   Initial Conference Date 08/30/24   Team Members Present   Team Members Present Physician;Nurse;;   Physician Team Member Vin/Toney   Nursing Team Member Sarahi   Care Management Team Member Jason   Social Work Team Member Ayse   Patient/Family Present   Patient Present No   Patient's Family Present No     Patient did not eat well yesterday. She asked for trazadone last night but still not sleep well. She has been more compliant with medications and care and has been less paranoid. Discharge is pending improvement of depression and anxiety.

## 2024-08-30 NOTE — NURSING NOTE
Pt more alert, verbal and spontaneous in the am.  Good appetite at breakfast, refused lunch.  VSS.   at 0800.  Attended group.  Pt more organized with no RIS noted.  Steady gait.  Monitored for safety and support.

## 2024-08-30 NOTE — PROGRESS NOTES
08/30/24 1100   Activity/Group Checklist   Group Exercise  (seated musical stretching)   Attendance Attended   Attendance Duration (min) 31-45   Interactions Unable to interact  (initially joined group with paranoid demeanor did not say her name when asked.Pt. did engage in the movements.Disheveled in appearance.)   Affect/Mood Other (Comment)   Goals Achieved Able to listen to others;Able to engage in interactions

## 2024-08-30 NOTE — NURSING NOTE
Alma is isolative. Pleasant and cooperative. Meli denies all s/s of psych. Alma is medication compliant. Will continue to monitor and support during treatment.

## 2024-08-30 NOTE — PLAN OF CARE
Problem: Prexisting or High Potential for Compromised Skin Integrity  Goal: Skin integrity is maintained or improved  Description: INTERVENTIONS:  - Identify patients at risk for skin breakdown  - Assess and monitor skin integrity  - Assess and monitor nutrition and hydration status  - Monitor labs   - Assess for incontinence   - Turn and reposition patient  - Assist with mobility/ambulation  - Relieve pressure over bony prominences  - Avoid friction and shearing  - Provide appropriate hygiene as needed including keeping skin clean and dry  - Evaluate need for skin moisturizer/barrier cream  - Collaborate with interdisciplinary team   - Patient/family teaching  - Consider wound care consult   Outcome: Progressing     Problem: Alteration in Thoughts and Perception  Goal: Verbalize thoughts and feelings  Description: Interventions:  - Promote a nonjudgmental and trusting relationship with the patient through active listening and therapeutic communication  - Assess patient's level of functioning, behavior and potential for risk  - Engage patient in 1 on 1 interactions  - Encourage patient to express fears, feelings, frustrations, and discuss symptoms    - [place] patient to reality, help patient recognize reality-based thinking   - Administer medications as ordered and assess for potential side effects  - Provide the patient education related to the signs and symptoms of the illness and desired effects of prescribed medications  Outcome: Progressing  Goal: Agree to be compliant with medication regime, as prescribed and report medication side effects  Description: Interventions:  - Offer appropriate PRN medication and supervise ingestion; conduct AIMS, as needed   Outcome: Progressing  Goal: Complete daily ADLs, including personal hygiene independently, as able  Description: Interventions:  - Observe, teach, and assist patient with ADLS  - Monitor and promote a balance of rest/activity, with adequate nutrition and  elimination   Outcome: Progressing     Problem: Ineffective Coping  Goal: Participates in unit activities  Description: Interventions:  - Provide therapeutic environment   - Provide required programming   - Redirect inappropriate behaviors   Outcome: Not Progressing  Goal: Free from restraint events  Description: - Utilize least restrictive measures   - Provide behavioral interventions   - Redirect inappropriate behaviors   Outcome: Progressing     Problem: Risk for Self Injury/Neglect  Goal: Refrain from harming self  Description: Interventions:  - Monitor patient closely, per order  - Develop a trusting relationship  - Supervise medication ingestion, monitor effects and side effects   Outcome: Progressing     Problem: Depression  Goal: Refrain from isolation  Description: Interventions:  - Develop a trusting relationship   - Encourage socialization   Outcome: Not Progressing     Problem: Anxiety  Goal: Anxiety is at manageable level  Description: Interventions:  - Assess and monitor patient's anxiety level.   - Monitor for signs and symptoms (heart palpitations, chest pain, shortness of breath, headaches, nausea, feeling jumpy, restlessness, irritable, apprehensive).   - Collaborate with interdisciplinary team and initiate plan and interventions as ordered.  - [place] patient to unit/surroundings  - Explain treatment plan  - Encourage participation in care  - Encourage verbalization of concerns/fears  - Identify coping mechanisms  - Assist in developing anxiety-reducing skills  - Administer/offer alternative therapies  - Limit or eliminate stimulants  Outcome: Progressing     Problem: Potential for Falls  Goal: Patient will remain free of falls  Description: INTERVENTIONS:  - Educate patient on patient safety including physical limitations  - Instruct patient to call for assistance with activity   - Consult OT/PT to assist with strengthening/mobility   - Keep Call bell within reach  - Keep bed low and locked with  side rails adjusted as appropriate  - Keep care items and personal belongings within reach  - Initiate and maintain comfort rounds  - Offer Toileting every 2 Hours, in advance of need  - Initiate/Maintain bed and chair alarm  - Obtain necessary fall risk management equipment: walker, wheelchair  - Apply yellow socks and bracelet for high fall risk patients  - Patient moved to room near nurses station  Outcome: Progressing     Problem: Nutrition/Hydration-ADULT  Goal: Nutrient/Hydration intake appropriate for improving, restoring or maintaining nutritional needs  Description: Monitor and assess patient's nutrition/hydration status for malnutrition. Collaborate with interdisciplinary team and initiate plan and interventions as ordered.  Monitor patient's weight and dietary intake as ordered or per policy. Utilize nutrition screening tool and intervene as necessary. Determine patient's food preferences and provide high-protein, high-caloric foods as appropriate.     INTERVENTIONS:  - Monitor oral intake, urinary output, labs, and treatment plans  - Assess nutrition and hydration status and recommend course of action  - Evaluate amount of meals eaten  - Assist patient with eating if necessary   - Allow adequate time for meals  - Recommend/ encourage appropriate diets, oral nutritional supplements, and vitamin/mineral supplements  - Order, calculate, and assess calorie counts as needed  - Recommend, monitor, and adjust tube feedings and TPN/PPN based on assessed needs  - Assess need for intravenous fluids  - Provide specific nutrition/hydration education as appropriate  - Include patient/family/caregiver in decisions related to nutrition  Outcome: Not Progressing

## 2024-08-31 PROCEDURE — 99232 SBSQ HOSP IP/OBS MODERATE 35: CPT | Performed by: STUDENT IN AN ORGANIZED HEALTH CARE EDUCATION/TRAINING PROGRAM

## 2024-08-31 RX ADMIN — FAMOTIDINE 20 MG: 20 TABLET ORAL at 17:07

## 2024-08-31 RX ADMIN — CARVEDILOL 6.25 MG: 6.25 TABLET, FILM COATED ORAL at 09:12

## 2024-08-31 RX ADMIN — CYANOCOBALAMIN TAB 1000 MCG 1000 MCG: 1000 TAB at 09:12

## 2024-08-31 RX ADMIN — ATORVASTATIN CALCIUM 10 MG: 10 TABLET, FILM COATED ORAL at 09:12

## 2024-08-31 RX ADMIN — FLUOXETINE 80 MG: 20 SOLUTION ORAL at 09:16

## 2024-08-31 RX ADMIN — Medication 15 ML: at 09:13

## 2024-08-31 RX ADMIN — FLUTICASONE FUROATE 1 PUFF: 100 POWDER RESPIRATORY (INHALATION) at 09:25

## 2024-08-31 RX ADMIN — CARVEDILOL 6.25 MG: 6.25 TABLET, FILM COATED ORAL at 17:07

## 2024-08-31 RX ADMIN — POLYETHYLENE GLYCOL 3350 17 G: 17 POWDER, FOR SOLUTION ORAL at 09:14

## 2024-08-31 RX ADMIN — LORAZEPAM 0.5 MG: 0.5 TABLET ORAL at 17:07

## 2024-08-31 RX ADMIN — SENNOSIDES AND DOCUSATE SODIUM 2 TABLET: 8.6; 5 TABLET ORAL at 21:09

## 2024-08-31 RX ADMIN — LORAZEPAM 0.5 MG: 0.5 TABLET ORAL at 09:12

## 2024-08-31 RX ADMIN — MELATONIN TAB 3 MG 3 MG: 3 TAB at 21:09

## 2024-08-31 RX ADMIN — NICOTINE 1 PATCH: 7 PATCH, EXTENDED RELEASE TRANSDERMAL at 09:14

## 2024-08-31 RX ADMIN — FAMOTIDINE 20 MG: 20 TABLET ORAL at 09:12

## 2024-08-31 RX ADMIN — CLOZAPINE 500 MG: 100 TABLET ORAL at 21:09

## 2024-08-31 NOTE — NURSING NOTE
Pt cooperative remained withdrawn to room laying in bed. Came out for meals and snack. Medication and meal compliant. Denies SI/HI. Q 7 minute checks maintained.

## 2024-08-31 NOTE — PLAN OF CARE
Problem: Prexisting or High Potential for Compromised Skin Integrity  Goal: Skin integrity is maintained or improved  Description: INTERVENTIONS:  - Identify patients at risk for skin breakdown  - Assess and monitor skin integrity  - Assess and monitor nutrition and hydration status  - Monitor labs   - Assess for incontinence   - Turn and reposition patient  - Assist with mobility/ambulation  - Relieve pressure over bony prominences  - Avoid friction and shearing  - Provide appropriate hygiene as needed including keeping skin clean and dry  - Evaluate need for skin moisturizer/barrier cream  - Collaborate with interdisciplinary team   - Patient/family teaching  - Consider wound care consult   Outcome: Progressing     Problem: Alteration in Thoughts and Perception  Goal: Verbalize thoughts and feelings  Description: Interventions:  - Promote a nonjudgmental and trusting relationship with the patient through active listening and therapeutic communication  - Assess patient's level of functioning, behavior and potential for risk  - Engage patient in 1 on 1 interactions  - Encourage patient to express fears, feelings, frustrations, and discuss symptoms    - [place] patient to reality, help patient recognize reality-based thinking   - Administer medications as ordered and assess for potential side effects  - Provide the patient education related to the signs and symptoms of the illness and desired effects of prescribed medications  Outcome: Progressing  Goal: Agree to be compliant with medication regime, as prescribed and report medication side effects  Description: Interventions:  - Offer appropriate PRN medication and supervise ingestion; conduct AIMS, as needed   Outcome: Progressing  Goal: Complete daily ADLs, including personal hygiene independently, as able  Description: Interventions:  - Observe, teach, and assist patient with ADLS  - Monitor and promote a balance of rest/activity, with adequate nutrition and  elimination   Outcome: Progressing     Problem: Ineffective Coping  Goal: Participates in unit activities  Description: Interventions:  - Provide therapeutic environment   - Provide required programming   - Redirect inappropriate behaviors   Outcome: Progressing  Goal: Free from restraint events  Description: - Utilize least restrictive measures   - Provide behavioral interventions   - Redirect inappropriate behaviors   Outcome: Progressing     Problem: Risk for Self Injury/Neglect  Goal: Refrain from harming self  Description: Interventions:  - Monitor patient closely, per order  - Develop a trusting relationship  - Supervise medication ingestion, monitor effects and side effects   Outcome: Progressing     Problem: Depression  Goal: Refrain from isolation  Description: Interventions:  - Develop a trusting relationship   - Encourage socialization   Outcome: Not Progressing  Goal: Refrain from self-neglect  Outcome: Progressing     Problem: Anxiety  Goal: Anxiety is at manageable level  Description: Interventions:  - Assess and monitor patient's anxiety level.   - Monitor for signs and symptoms (heart palpitations, chest pain, shortness of breath, headaches, nausea, feeling jumpy, restlessness, irritable, apprehensive).   - Collaborate with interdisciplinary team and initiate plan and interventions as ordered.  - [place] patient to unit/surroundings  - Explain treatment plan  - Encourage participation in care  - Encourage verbalization of concerns/fears  - Identify coping mechanisms  - Assist in developing anxiety-reducing skills  - Administer/offer alternative therapies  - Limit or eliminate stimulants  Outcome: Progressing     Problem: Potential for Falls  Goal: Patient will remain free of falls  Description: INTERVENTIONS:  - Educate patient on patient safety including physical limitations  - Instruct patient to call for assistance with activity   - Consult OT/PT to assist with strengthening/mobility   - Keep Call  bell within reach  - Keep bed low and locked with side rails adjusted as appropriate  - Keep care items and personal belongings within reach  - Initiate and maintain comfort rounds  - Offer Toileting every 2 Hours, in advance of need  - Initiate/Maintain bed and chair alarm  - Obtain necessary fall risk management equipment: walker, wheelchair  - Apply yellow socks and bracelet for high fall risk patients  - Patient moved to room near nurses station  Outcome: Progressing     Problem: Nutrition/Hydration-ADULT  Goal: Nutrient/Hydration intake appropriate for improving, restoring or maintaining nutritional needs  Description: Monitor and assess patient's nutrition/hydration status for malnutrition. Collaborate with interdisciplinary team and initiate plan and interventions as ordered.  Monitor patient's weight and dietary intake as ordered or per policy. Utilize nutrition screening tool and intervene as necessary. Determine patient's food preferences and provide high-protein, high-caloric foods as appropriate.     INTERVENTIONS:  - Monitor oral intake, urinary output, labs, and treatment plans  - Assess nutrition and hydration status and recommend course of action  - Evaluate amount of meals eaten  - Assist patient with eating if necessary   - Allow adequate time for meals  - Recommend/ encourage appropriate diets, oral nutritional supplements, and vitamin/mineral supplements  - Order, calculate, and assess calorie counts as needed  - Recommend, monitor, and adjust tube feedings and TPN/PPN based on assessed needs  - Assess need for intravenous fluids  - Provide specific nutrition/hydration education as appropriate  - Include patient/family/caregiver in decisions related to nutrition  Outcome: Progressing

## 2024-08-31 NOTE — PROGRESS NOTES
Progress Note - Behavioral Health   Laton ESPINOZA Nowak 59 y.o. female MRN: [id number]  Unit/Bed#: [facility] 651-01 Encounter: [id number]    Assessment & Plan   Principal Problem:    Schizoaffective disorder, bipolar type (HCC)  Active Problems:    Medical clearance for psychiatric admission    Type 2 diabetes mellitus (HCC)    Obesity    Tobacco abuse    Hyperlipidemia    Esophageal reflux    Essential (primary) hypertension    Vitamin B12 deficiency    Hypoxia    Ambulatory dysfunction    Mild protein-calorie malnutrition (HCC)      Plan:    - Encourage early mobility and having a structured day  - Provide frequent re-orientation, and cognitive stimulation  - Ensure assistive devices are in proper working order (eye-glasses, hearing aids)  - Encourage adequate hydration, nutrition and monitor bowel movements  - Maintain sleep-wake cycle: Uninterrupted sleep time; low-level lighting at night  - Fall precaution  - f/u SLIM recs regarding the medical problems   - f/u labs  - Continue medication titration and treatment plan; adjust medication to optimize treatment response and as clinically indicated. .     Scheduled medications:  Current Facility-Administered Medications   Medication Dose Route Frequency Provider Last Rate    acetaminophen  650 mg Oral Q4H PRN JOSE ELIAS Figueroa      acetaminophen  650 mg Oral Q4H PRN JOSE ELIAS Figueroa      acetaminophen  975 mg Oral Q6H PRN JOSE ELIAS Figueroa      aluminum-magnesium hydroxide-simethicone  30 mL Oral Q4H PRN JOSE ELIAS Puri      atorvastatin  10 mg Oral Daily JOSE ELIAS Figueroa      bisacodyl  10 mg Rectal Daily PRN JOSE ELIAS Figueroa      carvedilol  6.25 mg Oral BID With Meals JOSE ELIAS Figueroa      cloZAPine  500 mg Oral HS JOSE ELIAS Figueroa      cyanocobalamin  1,000 mcg Oral Daily JOSE ELIAS Figueroa      famotidine  20 mg Oral BID Shan Fitzgerald DO      FLUoxetine  80 mg Oral Daily JOSE ELIAS Figueroa      fluticasone  1 puff  Inhalation Daily Marie Ziegler, JOSE ELIAS      hydrOXYzine HCL  25 mg Oral Q6H PRN Max 4/day Marie Ziegler, CRVALE      hydrOXYzine HCL  50 mg Oral Q6H PRN Max 4/day Marie Ziegler, JOSE ELIAS      ipratropium-albuterol  3 mL Nebulization Q4H PRN Darin Lawton MD      LORazepam  0.5 mg Oral BID Marie Ziegler, JOSE ELIAS      Or    LORazepam  0.5 mg Intramuscular BID Marie Ziegler, JOSE ELIAS      LORazepam  1 mg Intramuscular Q6H PRN Max 3/day Marie Ziegler, JOSE ELIAS      LORazepam  1 mg Oral Q6H PRN Max 3/day Marie Ziegler, JOSE ELIAS      melatonin  3 mg Oral HS Marie Ziegler, JOSE ELIAS      midodrine  2.5 mg Oral BID AC Kristen Logan, JOSE ELIAS      Multivitamin  15 mL Oral Daily Marie Ziegler, JOSE ELIAS      nicotine  1 patch Transdermal Daily Marie Zigeler, JOSE ELIAS      OLANZapine  2.5 mg Oral Q6H PRN Dereje Banuelos MD      Or    OLANZapine  2.5 mg Intramuscular Q6H PRN Dereje Banuelos MD      OLANZapine  5 mg Oral Q6H PRN Dereje Banuelos MD      Or    OLANZapine  5 mg Intramuscular Q6H PRN Dereje Banuelos MD      ondansetron  4 mg Oral Q6H PRN Darin Lawton MD      polyethylene glycol  17 g Oral Daily PRN Marie Ziegler, JOSE ELIAS      polyethylene glycol  17 g Oral Daily JOSE ELIAS Ortega      senna-docusate sodium  2 tablet Oral HS Rehana Borrego PA-C      traZODone  50 mg Oral HS PRN JOSE ELIAS Figueroa        PRN:    acetaminophen    acetaminophen    acetaminophen    aluminum-magnesium hydroxide-simethicone    bisacodyl    hydrOXYzine HCL    hydrOXYzine HCL    ipratropium-albuterol    LORazepam    LORazepam    OLANZapine **OR** OLANZapine    OLANZapine **OR** OLANZapine    ondansetron    polyethylene glycol    traZODone    - Observation: routine            - VS: as per unit protocol  - Diet: Regular diet  - Encourage group attendance and milieu therapy  - Dispo: To be determined     Subjective     Patient was visited on unit for continuing care; chart reviewed and discussed with the nursing staff.  On approach,  "the patient was calm and cooperative. Denied any changes in mood, appetite, and energy level. No problem initiating and maintaining sleep.  The patient appears to regressed into patient's role, asking to stay in bed, preoccupied with somatic sxs, reportedly difficulties breathiing despite no distress and then stated: \"My Oxygen saturation is too high\" and was ruminating on her BP meds. She noted that she wants to have \"fish with potato, broccoli and noodles\" for lunch and once was told that she should go to the dining area when the staff take the orders, she stated: \"I just want to stay in bed today; can you bring it to me here?\" She also noted that she is not ready to go home, stated: \"I was thinking of decorating the room is I stay by [place]\". Rules and regulations were explained to the patient and she noted that she will go out too order food for her dinner, instead. She also reported vague AH at times, but denied A/VH since yesterday. Denied SI/HI, intent or plan upon direct inquiry at this time.    Patient continues to be visible in the milieu and interacts with staff and peers but demanding at times, requires frequent redirection and reorientation by the staff. No reports of aggression or self-injurious behavior on unit. No PRN medications used in the past 24 hours.    Patient accepted all offered medications and no adverse effects of medications noted or reported. Clozaril level was 668 on 8/26/24. The patient is on MOO. Clozaril has been uptitrated and the patient is showing gradual improvements in her psychotic sxs. Clozaril timing is being switched to nightly to avoid day-time sedation and Prozac is being uptitrated as indicated.    Objective    Current Mental Status Evaluation:  Appearance and attitude: appeared as stated age, dressed in hospital attire, with fair hygiene  Eye contact: good  Motor Function: within normal limits, No PMA/PMR  Gait/station: Not observed  Speech: normal for rate, rhythm, " "volume, latency, amount  Language: No overt abnormality  Mood/affect: \"good\" / Affect was constricted but reactive, mood congruent  Thought Processes: illogical, ruminating  Thought content: denied suicidal ideations or homicidal ideations, some paranoia, ruminations  Associations: concrete associations  Perceptual disturbances: denies Auditory/Visual/Tactile Hallucinations  Orientation: oriented to time, person, place and to the situational context  Cognitive Function: intact  Memory: not cooperative with formal MMSE  Intellect: unable to assess  Fund of knowledge: aware of current events, aware of past history, and vocabulary average  Impulse control: fair  Insight/judgment: impaired/impaired            Vital signs in last 24 hours:    Temp:  [97.4 °F (36.3 °C)-98.2 °F (36.8 °C)] 97.4 °F (36.3 °C)  HR:  [73-97] 87  Resp:  [16-19] 16  BP: (121-165)/(59-94) 125/69    Laboratory results: I have personally reviewed all pertinent laboratory/tests results    Results from the past 24 hours: No results found for this or any previous visit (from the past 24 hour(s)).    Progress Toward Goals: limited improvement    Next of Kin  Extended Emergency Contact Information  Primary Emergency Contact: Chari Nowak  Mobile Phone: [phone]  Relation: Daughter  Secondary Emergency Contact: Conchita Hall  [place] Phone: [phone]  Mobile Phone: [phone]  Relation: Other      Counseling / Coordination of Care  Patient's progress discussed with staff in treatment team meeting.  Medications, treatment progress and treatment plan reviewed with patient.  Medication education provided to patient.  Supportive therapy provided to patient.  Cognitive techniques utilized during the session.  Reassurance and supportive therapy provided.  Reoriented to reality and reassured.  Encouraged participation in milieu and group therapy on the unit.  Crisis/safety plan discussed with patient.       Dereje Banuelos MD  Attending Psychiatrist   [facility] " [facility]       This note was completed in part utilizing Dragon dictation Software. Grammatical, translation, syntax errors, random word insertions, spelling mistakes, and incomplete sentences may be an occasional consequence of this system secondary to software limitations with voice recognition, ambient noise, and hardware issues. If you have any questions or concerns about the content, text, or information contained within the body of this dictation, please contact the provider for clarification.

## 2024-08-31 NOTE — NURSING NOTE
Patient was withdrawn to her room and did not come out for breakfast. Denies all psych s/s. No behaviors noted. C/o not feeling well but VSS but positive for orthostatic BP, lying 165/77, sitting 160/86 and standing 125/69. Provider notified. Declined breakfast. Took her medications but midodrine was held due to parameter. Safety checks ongoing.

## 2024-09-01 LAB — GLUCOSE SERPL-MCNC: 112 MG/DL (ref 65–140)

## 2024-09-01 PROCEDURE — 82948 REAGENT STRIP/BLOOD GLUCOSE: CPT

## 2024-09-01 PROCEDURE — 99232 SBSQ HOSP IP/OBS MODERATE 35: CPT | Performed by: STUDENT IN AN ORGANIZED HEALTH CARE EDUCATION/TRAINING PROGRAM

## 2024-09-01 RX ADMIN — CLOZAPINE 500 MG: 100 TABLET ORAL at 21:08

## 2024-09-01 RX ADMIN — ALUMINUM HYDROXIDE, MAGNESIUM HYDROXIDE, AND DIMETHICONE 30 ML: 200; 20; 200 SUSPENSION ORAL at 05:12

## 2024-09-01 RX ADMIN — Medication 15 ML: at 08:56

## 2024-09-01 RX ADMIN — CARVEDILOL 6.25 MG: 6.25 TABLET, FILM COATED ORAL at 08:57

## 2024-09-01 RX ADMIN — NICOTINE 1 PATCH: 7 PATCH, EXTENDED RELEASE TRANSDERMAL at 08:58

## 2024-09-01 RX ADMIN — SENNOSIDES AND DOCUSATE SODIUM 2 TABLET: 8.6; 5 TABLET ORAL at 21:08

## 2024-09-01 RX ADMIN — DEXTRAN 70, GLYCERIN, HYPROMELLOSE 1 DROP: 1; 2; 3 SOLUTION/ DROPS OPHTHALMIC at 20:47

## 2024-09-01 RX ADMIN — CARVEDILOL 6.25 MG: 6.25 TABLET, FILM COATED ORAL at 16:00

## 2024-09-01 RX ADMIN — LORAZEPAM 0.5 MG: 0.5 TABLET ORAL at 08:57

## 2024-09-01 RX ADMIN — LORAZEPAM 0.5 MG: 0.5 TABLET ORAL at 17:09

## 2024-09-01 RX ADMIN — ATORVASTATIN CALCIUM 10 MG: 10 TABLET, FILM COATED ORAL at 08:57

## 2024-09-01 RX ADMIN — CYANOCOBALAMIN TAB 1000 MCG 1000 MCG: 1000 TAB at 08:57

## 2024-09-01 RX ADMIN — MELATONIN TAB 3 MG 3 MG: 3 TAB at 21:07

## 2024-09-01 RX ADMIN — FAMOTIDINE 20 MG: 20 TABLET ORAL at 08:57

## 2024-09-01 RX ADMIN — FLUTICASONE FUROATE 1 PUFF: 100 POWDER RESPIRATORY (INHALATION) at 09:00

## 2024-09-01 RX ADMIN — FLUOXETINE 80 MG: 20 SOLUTION ORAL at 09:00

## 2024-09-01 RX ADMIN — FAMOTIDINE 20 MG: 20 TABLET ORAL at 17:10

## 2024-09-01 NOTE — PLAN OF CARE
Problem: Prexisting or High Potential for Compromised Skin Integrity  Goal: Skin integrity is maintained or improved  Description: INTERVENTIONS:  - Identify patients at risk for skin breakdown  - Assess and monitor skin integrity  - Assess and monitor nutrition and hydration status  - Monitor labs   - Assess for incontinence   - Turn and reposition patient  - Assist with mobility/ambulation  - Relieve pressure over bony prominences  - Avoid friction and shearing  - Provide appropriate hygiene as needed including keeping skin clean and dry  - Evaluate need for skin moisturizer/barrier cream  - Collaborate with interdisciplinary team   - Patient/family teaching  - Consider wound care consult   Outcome: Progressing     Problem: Alteration in Thoughts and Perception  Goal: Treatment Goal: Gain control of psychotic behaviors/thinking, reduce/eliminate presenting symptoms and demonstrate improved reality functioning upon discharge  Outcome: Progressing  Goal: Verbalize thoughts and feelings  Description: Interventions:  - Promote a nonjudgmental and trusting relationship with the patient through active listening and therapeutic communication  - Assess patient's level of functioning, behavior and potential for risk  - Engage patient in 1 on 1 interactions  - Encourage patient to express fears, feelings, frustrations, and discuss symptoms    - [place] patient to reality, help patient recognize reality-based thinking   - Administer medications as ordered and assess for potential side effects  - Provide the patient education related to the signs and symptoms of the illness and desired effects of prescribed medications  Outcome: Progressing  Goal: Refrain from acting on delusional thinking/internal stimuli  Description: Interventions:  - Monitor patient closely, per order   - Utilize least restrictive measures   - Set reasonable limits, give positive feedback for acceptable   - Administer medications as ordered and monitor  of potential side effects  Outcome: Progressing  Goal: Agree to be compliant with medication regime, as prescribed and report medication side effects  Description: Interventions:  - Offer appropriate PRN medication and supervise ingestion; conduct AIMS, as needed   Outcome: Progressing  Goal: Recognize dysfunctional thoughts, communicate reality-based thoughts at the time of discharge  Description: Interventions:  - Provide medication and psycho-education to assist patient in compliance and developing insight into his/her illness   Outcome: Progressing  Goal: Complete daily ADLs, including personal hygiene independently, as able  Description: Interventions:  - Observe, teach, and assist patient with ADLS  - Monitor and promote a balance of rest/activity, with adequate nutrition and elimination   Outcome: Progressing     Problem: Risk for Self Injury/Neglect  Goal: Treatment Goal: Remain safe during length of stay, learn and adopt new coping skills, and be free of self-injurious ideation, impulses and acts at the time of discharge  Outcome: Progressing  Goal: Verbalize thoughts and feelings  Description: Interventions:  - Assess and re-assess patient's lethality and potential for self-injury  - Engage patient in 1:1 interactions, daily, for a minimum of 15 minutes  - Encourage patient to express feelings, fears, frustrations, hopes  - Establish rapport/trust with patient   Outcome: Progressing  Goal: Refrain from harming self  Description: Interventions:  - Monitor patient closely, per order  - Develop a trusting relationship  - Supervise medication ingestion, monitor effects and side effects   Outcome: Progressing  Goal: Recognize maladaptive responses and adopt new coping mechanisms  Outcome: Progressing     Problem: Depression  Goal: Treatment Goal: Demonstrate behavioral control of depressive symptoms, verbalize feelings of improved mood/affect, and adopt new coping skills prior to discharge  Outcome:  Progressing  Goal: Verbalize thoughts and feelings  Description: Interventions:  - Assess and re-assess patient's level of risk   - Engage patient in 1:1 interactions, daily, for a minimum of 15 minutes   - Encourage patient to express feelings, fears, frustrations, hopes   Outcome: Progressing  Goal: Refrain from isolation  Description: Interventions:  - Develop a trusting relationship   - Encourage socialization   Outcome: Progressing  Goal: Refrain from self-neglect  Outcome: Progressing     Problem: Anxiety  Goal: Anxiety is at manageable level  Description: Interventions:  - Assess and monitor patient's anxiety level.   - Monitor for signs and symptoms (heart palpitations, chest pain, shortness of breath, headaches, nausea, feeling jumpy, restlessness, irritable, apprehensive).   - Collaborate with interdisciplinary team and initiate plan and interventions as ordered.  - [place] patient to unit/surroundings  - Explain treatment plan  - Encourage participation in care  - Encourage verbalization of concerns/fears  - Identify coping mechanisms  - Assist in developing anxiety-reducing skills  - Administer/offer alternative therapies  - Limit or eliminate stimulants  Outcome: Progressing

## 2024-09-01 NOTE — NURSING NOTE
Pt has been calm, yet testing limits with showering and medications. Pt giving nurse a hard time about showering nurse reminded patient the need for personal hygiene because she is trying to go home. Pt instructed to go with staff to shower and get her hair washed. Pt trying to refuse medications, explained that if she does not take her scheduled medications she is still able to receive an IM injection and that these behaviors she is having are not appropriate for her to discharge. She told nurse the medications make her tired, informed to stop staying in her room all day and come out to watch television or do some puzzles. Pt then became compliant took her medication then went into day room to watch television for several hours. Meal completions have been poor. Denies SI/HI. Q 7 minute checks maintained.

## 2024-09-01 NOTE — NURSING NOTE
Patient calm and cooperative, guarded, suspicious able to make needs known. Is occasionally visible on the module, not social with peers, attends groups. Denies SI/HI/AVH, anxiety or depression. Medication compliant with encouragement. Will continue to monitor.

## 2024-09-01 NOTE — PROGRESS NOTES
Progress Note - Behavioral Health   [place] ESPINOZA Nowak 59 y.o. female MRN: [id number]  Unit/Bed#: [facility] 651-01 Encounter: [id number]    Assessment & Plan   Principal Problem:    Schizoaffective disorder, bipolar type (HCC)  Active Problems:    Medical clearance for psychiatric admission    Type 2 diabetes mellitus (HCC)    Obesity    Tobacco abuse    Hyperlipidemia    Esophageal reflux    Essential (primary) hypertension    Vitamin B12 deficiency    Hypoxia    Ambulatory dysfunction    Mild protein-calorie malnutrition (HCC)      Plan:    - Encourage early mobility and having a structured day  - Provide frequent re-orientation, and cognitive stimulation  - Ensure assistive devices are in proper working order (eye-glasses, hearing aids)  - Encourage adequate hydration, nutrition and monitor bowel movements  - Maintain sleep-wake cycle: Uninterrupted sleep time; low-level lighting at night  - Fall precaution  - f/u SLIM recs regarding the medical problems   - Continue medication titration and treatment plan; adjust medication to optimize treatment response and as clinically indicated. .     Scheduled medications:  Current Facility-Administered Medications   Medication Dose Route Frequency Provider Last Rate    acetaminophen  650 mg Oral Q4H PRN JOSE ELIAS Figueroa      acetaminophen  650 mg Oral Q4H PRN JOSE ELIAS Figueroa      acetaminophen  975 mg Oral Q6H PRN JOSE ELIAS Figueroa      aluminum-magnesium hydroxide-simethicone  30 mL Oral Q4H PRN JOSE ELIAS Puri      Artificial Tears  1 drop Both Eyes Q6H PRN Dereje Banuelos MD      atorvastatin  10 mg Oral Daily JOSE ELIAS Figueroa      bisacodyl  10 mg Rectal Daily PRN JOSE ELIAS Figueroa      carvedilol  6.25 mg Oral BID With Meals JOSE ELIAS Figueroa      cloZAPine  500 mg Oral HS JOSE ELIAS Figueroa      cyanocobalamin  1,000 mcg Oral Daily JOSE ELIAS Figueroa      famotidine  20 mg Oral BID Shan Fitzgerald DO      FLUoxetine  80 mg Oral  Daily Marie Ziegler, CRVALE      fluticasone  1 puff Inhalation Daily Marie Zielger, CRVALE      hydrOXYzine HCL  25 mg Oral Q6H PRN Max 4/day Marie Ziegler, CRVALE      hydrOXYzine HCL  50 mg Oral Q6H PRN Max 4/day Marie Ziegler, JOSE ELIAS      ipratropium-albuterol  3 mL Nebulization Q4H PRN Darin Lawton MD      LORazepam  0.5 mg Oral BID Marie Ziegler, CRNP      Or    LORazepam  0.5 mg Intramuscular BID Marie Ziegler, CRNP      LORazepam  1 mg Intramuscular Q6H PRN Max 3/day Marie Ziegler, CRNP      LORazepam  1 mg Oral Q6H PRN Max 3/day Marie Ziegler, CHRISTOPHERNP      melatonin  3 mg Oral HS Marie Ziegler, JOSE ELIAS      Multivitamin  15 mL Oral Daily Marie Ziegler, CHRISTOPHERNP      nicotine  1 patch Transdermal Daily Marie Ziegler, JOSE ELIAS      OLANZapine  2.5 mg Oral Q6H PRN Dereje Banuelos MD      Or    OLANZapine  2.5 mg Intramuscular Q6H PRN Dereje Banuelos MD      OLANZapine  5 mg Oral Q6H PRN Dereje Banuelos MD      Or    OLANZapine  5 mg Intramuscular Q6H PRN Dereje Banuelos MD      ondansetron  4 mg Oral Q6H PRN Darin Lawton MD      polyethylene glycol  17 g Oral Daily PRN Marie Ziegler, JOSE ELIAS      polyethylene glycol  17 g Oral Daily Kristen Logan, JOSE ELIAS      senna-docusate sodium  2 tablet Oral HS Rehana Borrego PA-C      traZODone  50 mg Oral HS PRN Marie Ziegler, JOSE ELIAS        PRN:    acetaminophen    acetaminophen    acetaminophen    aluminum-magnesium hydroxide-simethicone    Artificial Tears    bisacodyl    hydrOXYzine HCL    hydrOXYzine HCL    ipratropium-albuterol    LORazepam    LORazepam    OLANZapine **OR** OLANZapine    OLANZapine **OR** OLANZapine    ondansetron    polyethylene glycol    traZODone    - Observation: routine            - VS: as per unit protocol  - Diet: Regular diet  - Encourage group attendance and milieu therapy  - Dispo: To be determined     Subjective     Patient was visited on unit for continuing care; chart reviewed and discussed with multidisciplinary  "treatment team.  On approach, the patient was calm, pleasant and cooperative. Endorsed better mood and less anxiety sxs, but continues to have negative thinking and ruminating thoughts. Remains preoccupied with somatic sxs including GI sxs and dry eyes, as well as food. She was asking for \"Irina candy bar without almonds\". She also remains hyper-Christian.. No problem initiating and maintaining sleep.  Denied A/VH currently.  Denied SI/HI, intent or plan upon direct inquiry at this time.    Patient continues to be intermittently visible in the milieu, mostly withdrawn and requires frequent redirection and reorientation by the staff. No reports of aggression or self-injurious behavior on unit. No PRN medications used in the past 24 hours.    Patient accepted all offered medications and no adverse effects of medications noted or reported. Clozaril level was 668 on 8/26/24. The patient is on MOO. Clozaril has been uptitrated and the patient is showing gradual improvements in her psychotic sxs. Clozaril timing is being switched to nightly to avoid day-time sedation and Prozac is being uptitrated as indicated.    Objective    Current Mental Status Evaluation:  Appearance and attitude: appeared as stated age, dressed in hospital attire, with fair hygiene  Eye contact: good  Motor Function: within normal limits, No PMA/PMR  Gait/station: Not observed  Speech: talking in soft tone, with normal latency and amount  Language: No overt abnormality  Mood/affect: \"better\" / Affect was constricted but reactive, mood congruent  Thought Processes: ruminating  Thought content: denied suicidal ideations or homicidal ideations, some paranoia, ruminations, preoccupied with somatic sxs and food  Associations: concrete associations, perseverative  Perceptual disturbances: denies Auditory/Visual/Tactile Hallucinations, appears preoccupied  Orientation: oriented to time, person, place and to the situational context  Cognitive Function: " intact  Memory: not cooperative with formal MMSE  Intellect: unable to assess  Fund of knowledge: diminished  Impulse control: good  Insight/judgment: impaired/impaired            Vital signs in last 24 hours:    Temp:  [97.2 °F (36.2 °C)-98.5 °F (36.9 °C)] 97.4 °F (36.3 °C)  HR:  [] 106  Resp:  [18] 18  BP: (128-154)/(73-90) 142/81    Laboratory results: I have personally reviewed all pertinent laboratory/tests results        Progress Toward Goals: slight improvement    Next of Kin  Extended Emergency Contact Information  Primary Emergency Contact: Chari Nowak  Mobile Phone: [phone]  Relation: Daughter  Secondary Emergency Contact: Conchita Hall  Home Phone: [phone]  Mobile Phone: [phone]  Relation: Other      Counseling / Coordination of Care  Patient's progress discussed with staff in treatment team meeting.  Medications, treatment progress and treatment plan reviewed with patient.  Medication education provided to patient.  Supportive therapy provided to patient.  Cognitive techniques utilized during the session.  Reassurance and supportive therapy provided.  Reoriented to reality and reassured.  Encouraged participation in milieu and group therapy on the unit.  Crisis/safety plan discussed with patient.       Dereje Banuelos MD  Attending Psychiatrist   [facility]       This note was completed in part utilizing Dragon dictation Software. Grammatical, translation, syntax errors, random word insertions, spelling mistakes, and incomplete sentences may be an occasional consequence of this system secondary to software limitations with voice recognition, ambient noise, and hardware issues. If you have any questions or concerns about the content, text, or information contained within the body of this dictation, please contact the provider for clarification.

## 2024-09-02 LAB
BASOPHILS # BLD AUTO: 0.06 THOUSANDS/ΜL (ref 0–0.1)
BASOPHILS NFR BLD AUTO: 1 % (ref 0–1)
CK SERPL-CCNC: 26 U/L (ref 26–192)
CRP SERPL QL: 6.1 MG/L
EOSINOPHIL # BLD AUTO: 0 THOUSAND/ΜL (ref 0–0.61)
EOSINOPHIL NFR BLD AUTO: 0 % (ref 0–6)
ERYTHROCYTE [DISTWIDTH] IN BLOOD BY AUTOMATED COUNT: 15.1 % (ref 11.6–15.1)
GLUCOSE SERPL-MCNC: 103 MG/DL (ref 65–140)
HCT VFR BLD AUTO: 43.4 % (ref 34.8–46.1)
HGB BLD-MCNC: 14 G/DL (ref 11.5–15.4)
IMM GRANULOCYTES # BLD AUTO: 0.03 THOUSAND/UL (ref 0–0.2)
IMM GRANULOCYTES NFR BLD AUTO: 0 % (ref 0–2)
LYMPHOCYTES # BLD AUTO: 2.73 THOUSANDS/ΜL (ref 0.6–4.47)
LYMPHOCYTES NFR BLD AUTO: 36 % (ref 14–44)
MCH RBC QN AUTO: 30.2 PG (ref 26.8–34.3)
MCHC RBC AUTO-ENTMCNC: 32.3 G/DL (ref 31.4–37.4)
MCV RBC AUTO: 94 FL (ref 82–98)
MONOCYTES # BLD AUTO: 0.94 THOUSAND/ΜL (ref 0.17–1.22)
MONOCYTES NFR BLD AUTO: 13 % (ref 4–12)
NEUTROPHILS # BLD AUTO: 3.74 THOUSANDS/ΜL (ref 1.85–7.62)
NEUTS SEG NFR BLD AUTO: 50 % (ref 43–75)
NRBC BLD AUTO-RTO: 0 /100 WBCS
PLATELET # BLD AUTO: 295 THOUSANDS/UL (ref 149–390)
PMV BLD AUTO: 9.8 FL (ref 8.9–12.7)
RBC # BLD AUTO: 4.64 MILLION/UL (ref 3.81–5.12)
WBC # BLD AUTO: 7.5 THOUSAND/UL (ref 4.31–10.16)

## 2024-09-02 PROCEDURE — 86140 C-REACTIVE PROTEIN: CPT

## 2024-09-02 PROCEDURE — 82550 ASSAY OF CK (CPK): CPT

## 2024-09-02 PROCEDURE — 85025 COMPLETE CBC W/AUTO DIFF WBC: CPT

## 2024-09-02 PROCEDURE — 82948 REAGENT STRIP/BLOOD GLUCOSE: CPT

## 2024-09-02 PROCEDURE — 99232 SBSQ HOSP IP/OBS MODERATE 35: CPT | Performed by: STUDENT IN AN ORGANIZED HEALTH CARE EDUCATION/TRAINING PROGRAM

## 2024-09-02 RX ADMIN — FLUTICASONE FUROATE 1 PUFF: 100 POWDER RESPIRATORY (INHALATION) at 08:10

## 2024-09-02 RX ADMIN — FAMOTIDINE 20 MG: 20 TABLET ORAL at 17:30

## 2024-09-02 RX ADMIN — NICOTINE 1 PATCH: 7 PATCH, EXTENDED RELEASE TRANSDERMAL at 08:09

## 2024-09-02 RX ADMIN — MELATONIN TAB 3 MG 3 MG: 3 TAB at 21:07

## 2024-09-02 RX ADMIN — ATORVASTATIN CALCIUM 10 MG: 10 TABLET, FILM COATED ORAL at 08:07

## 2024-09-02 RX ADMIN — CYANOCOBALAMIN TAB 1000 MCG 1000 MCG: 1000 TAB at 08:07

## 2024-09-02 RX ADMIN — CLOZAPINE 500 MG: 100 TABLET ORAL at 21:07

## 2024-09-02 RX ADMIN — CARVEDILOL 6.25 MG: 6.25 TABLET, FILM COATED ORAL at 08:07

## 2024-09-02 RX ADMIN — SENNOSIDES AND DOCUSATE SODIUM 2 TABLET: 8.6; 5 TABLET ORAL at 21:07

## 2024-09-02 RX ADMIN — FLUOXETINE 80 MG: 20 SOLUTION ORAL at 08:13

## 2024-09-02 RX ADMIN — LORAZEPAM 0.5 MG: 0.5 TABLET ORAL at 17:29

## 2024-09-02 RX ADMIN — LORAZEPAM 0.5 MG: 0.5 TABLET ORAL at 08:07

## 2024-09-02 RX ADMIN — Medication 15 ML: at 08:06

## 2024-09-02 RX ADMIN — FAMOTIDINE 20 MG: 20 TABLET ORAL at 08:07

## 2024-09-02 NOTE — NURSING NOTE
Patient is visible in the milieu intermittently, reports anxiety during lunch time. Patient denies all psych s/s. Patient is medication compliant. Patient offers no c/o at the moment, resting in bed comfortably. Safety checks maintained.

## 2024-09-02 NOTE — PLAN OF CARE
Problem: Prexisting or High Potential for Compromised Skin Integrity  Goal: Skin integrity is maintained or improved  Description: INTERVENTIONS:  - Identify patients at risk for skin breakdown  - Assess and monitor skin integrity  - Assess and monitor nutrition and hydration status  - Monitor labs   - Assess for incontinence   - Turn and reposition patient  - Assist with mobility/ambulation  - Relieve pressure over bony prominences  - Avoid friction and shearing  - Provide appropriate hygiene as needed including keeping skin clean and dry  - Evaluate need for skin moisturizer/barrier cream  - Collaborate with interdisciplinary team   - Patient/family teaching  - Consider wound care consult   Outcome: Progressing     Problem: Alteration in Thoughts and Perception  Goal: Treatment Goal: Gain control of psychotic behaviors/thinking, reduce/eliminate presenting symptoms and demonstrate improved reality functioning upon discharge  Outcome: Progressing  Goal: Verbalize thoughts and feelings  Description: Interventions:  - Promote a nonjudgmental and trusting relationship with the patient through active listening and therapeutic communication  - Assess patient's level of functioning, behavior and potential for risk  - Engage patient in 1 on 1 interactions  - Encourage patient to express fears, feelings, frustrations, and discuss symptoms    - [place] patient to reality, help patient recognize reality-based thinking   - Administer medications as ordered and assess for potential side effects  - Provide the patient education related to the signs and symptoms of the illness and desired effects of prescribed medications  Outcome: Progressing  Goal: Refrain from acting on delusional thinking/internal stimuli  Description: Interventions:  - Monitor patient closely, per order   - Utilize least restrictive measures   - Set reasonable limits, give positive feedback for acceptable   - Administer medications as ordered and monitor  of potential side effects  Outcome: Progressing  Goal: Agree to be compliant with medication regime, as prescribed and report medication side effects  Description: Interventions:  - Offer appropriate PRN medication and supervise ingestion; conduct AIMS, as needed   Outcome: Progressing  Goal: Recognize dysfunctional thoughts, communicate reality-based thoughts at the time of discharge  Description: Interventions:  - Provide medication and psycho-education to assist patient in compliance and developing insight into his/her illness   Outcome: Progressing  Goal: Complete daily ADLs, including personal hygiene independently, as able  Description: Interventions:  - Observe, teach, and assist patient with ADLS  - Monitor and promote a balance of rest/activity, with adequate nutrition and elimination   Outcome: Progressing

## 2024-09-02 NOTE — NURSING NOTE
Patient was withdrawn to her room lying in bed quietly but visible intermittently to make her needs known to staff. Denies all psych s/s. No behaviors noted. No c/o pain. Took her HS medications. Safety checks ongoing.

## 2024-09-02 NOTE — PROGRESS NOTES
Progress Note - Behavioral Health   [place] ESPINOZA Nowak 59 y.o. female MRN: [id number]  Unit/Bed#: [facility] 651-01 Encounter: [id number]    Assessment & Plan   Principal Problem:    Schizoaffective disorder, bipolar type (HCC)  Active Problems:    Medical clearance for psychiatric admission    Type 2 diabetes mellitus (HCC)    Obesity    Tobacco abuse    Hyperlipidemia    Esophageal reflux    Essential (primary) hypertension    Vitamin B12 deficiency    Hypoxia    Ambulatory dysfunction    Mild protein-calorie malnutrition (HCC)      Plan:    - Encourage early mobility and having a structured day  - Provide frequent re-orientation, and cognitive stimulation  - Ensure assistive devices are in proper working order (eye-glasses, hearing aids)  - Encourage adequate hydration, nutrition and monitor bowel movements  - Maintain sleep-wake cycle: Uninterrupted sleep time; low-level lighting at night  - Fall precaution  - f/u SLIM recs regarding the medical problems   - f/u labs  - Continue medication titration and treatment plan; adjust medication to optimize treatment response and as clinically indicated. .     Scheduled medications:  Current Facility-Administered Medications   Medication Dose Route Frequency Provider Last Rate    acetaminophen  650 mg Oral Q4H PRN JOSE ELIAS Figueroa      acetaminophen  650 mg Oral Q4H PRN JOSE ELIAS Figueroa      acetaminophen  975 mg Oral Q6H PRN JOSE ELIAS Figueroa      aluminum-magnesium hydroxide-simethicone  30 mL Oral Q4H PRN JOSE ELIAS Puri      Artificial Tears  1 drop Both Eyes Q6H PRN Dereje Banuelos MD      atorvastatin  10 mg Oral Daily JOSE ELIAS Figueroa      bisacodyl  10 mg Rectal Daily PRN JOSE ELIAS Figueroa      carvedilol  6.25 mg Oral BID With Meals JOSE ELIAS Figueroa      cloZAPine  500 mg Oral HS JOSE ELIAS Figueroa      cyanocobalamin  1,000 mcg Oral Daily JOSE ELIAS Figueroa      famotidine  20 mg Oral BID Shan Fitzgerald DO      FLUoxetine  80  mg Oral Daily Marie Ziegler, CRNP      fluticasone  1 puff Inhalation Daily Marie Ziegler, CRNP      hydrOXYzine HCL  25 mg Oral Q6H PRN Max 4/day Marie Ziegler, CRVALE      hydrOXYzine HCL  50 mg Oral Q6H PRN Max 4/day Marie Ziegler, JOSE ELIAS      ipratropium-albuterol  3 mL Nebulization Q4H PRN Darin Lawton MD      LORazepam  0.5 mg Oral BID Marie Ziegler, CRNP      Or    LORazepam  0.5 mg Intramuscular BID Marie Ziegler, CRNP      LORazepam  1 mg Intramuscular Q6H PRN Max 3/day Marie Ziegler, CRNP      LORazepam  1 mg Oral Q6H PRN Max 3/day Marie Ziegler, CRNP      melatonin  3 mg Oral HS Marie Ziegler, JOSE ELIAS      Multivitamin  15 mL Oral Daily Marie Ziegler, CRNP      nicotine  1 patch Transdermal Daily Marie Ziegler, JOSE ELIAS      OLANZapine  2.5 mg Oral Q6H PRN Dereje Banuelos MD      Or    OLANZapine  2.5 mg Intramuscular Q6H PRN Dereje Banuelos MD      OLANZapine  5 mg Oral Q6H PRN Dereje Banuelos MD      Or    OLANZapine  5 mg Intramuscular Q6H PRN Dereje Banuelos MD      ondansetron  4 mg Oral Q6H PRN Darin Lawton MD      polyethylene glycol  17 g Oral Daily PRN Marie Ziegler, JOSE ELIAS      polyethylene glycol  17 g Oral Daily Kristen Logan, JOSE ELIAS      senna-docusate sodium  2 tablet Oral HS Rehana Borrego PA-C      traZODone  50 mg Oral HS PRN Marie Ziegler, JOSE ELIAS        PRN:    acetaminophen    acetaminophen    acetaminophen    aluminum-magnesium hydroxide-simethicone    Artificial Tears    bisacodyl    hydrOXYzine HCL    hydrOXYzine HCL    ipratropium-albuterol    LORazepam    LORazepam    OLANZapine **OR** OLANZapine    OLANZapine **OR** OLANZapine    ondansetron    polyethylene glycol    traZODone    - Observation: routine            - VS: as per unit protocol  - Diet: Regular diet  - Encourage group attendance and milieu therapy  - Dispo: To be determined     Subjective     Patient was visited on unit for continuing care; chart reviewed and discussed with the nursing  "staff.  On approach, the patient was calm and cooperative. Endorsed better mood and less anxiety sxs. The patient remains hyper-Shinto, somatically preoccupied but with brighter affect and less disorganized. No problem initiating and maintaining sleep.  Denied A/VH currently.  Remains internally preoccupied. Denied SI/HI, intent or plan upon direct inquiry at this time.    The patient is intermittently visible in the milieu but remains mostly withdrawn to self. No reports of aggression or self-injurious behavior on unit. No PRN medications used in the past 24 hours.    Patient accepted all offered medications and no adverse effects of medications noted or reported. Clozaril level was 668 on 8/26/24. The patient is on MOO. Clozaril has been uptitrated and the patient is showing gradual improvements in her psychotic sxs. Clozaril timing switched to nightly to avoid day-time sedation and Prozac is being uptitrated as indicated. ANC was 3.74 today. Denied constipation.    Objective    Current Mental Status Evaluation:  Appearance and attitude: appeared as stated age, dressed in hospital attire, with fair hygiene  Eye contact: good  Motor Function: within normal limits, No PMA/PMR  Gait/station: Not observed  Speech: talking in soft tone with normal latency and decreased amount  Language: No overt abnormality  Mood/affect: \"good\" / Affect was constricted but reactive, mood congruent, brighter  Thought Processes: concrete, ruminating  Thought content: denied suicidal ideations or homicidal ideations, Shinto preoccupation, some paranoia, ruminations  Associations: concrete associations  Perceptual disturbances: denies Auditory/Visual/Tactile Hallucinations, internally preoccupied  Orientation: oriented to time, person, place and to the situational context  Cognitive Function: intact  Memory: not cooperative with formal MMSE  Intellect: unable to assess  Fund of knowledge: aware of current events and vocabulary " average  Impulse control: good  Insight/judgment: limited/limited            Vital signs in last 24 hours:    Temp:  [96.7 °F (35.9 °C)-97.5 °F (36.4 °C)] 97.5 °F (36.4 °C)  HR:  [66-91] 66  Resp:  [18] 18  BP: ()/(56-84) 143/84    Laboratory results: I have personally reviewed all pertinent laboratory/tests results    Results from the past 24 hours:   Recent Results (from the past 24 hour(s))   CBC and differential    Collection Time: 09/02/24  6:04 AM   Result Value Ref Range    WBC 7.50 4.31 - 10.16 Thousand/uL    RBC 4.64 3.81 - 5.12 Million/uL    Hemoglobin 14.0 11.5 - 15.4 g/dL    Hematocrit 43.4 34.8 - 46.1 %    MCV 94 82 - 98 fL    MCH 30.2 26.8 - 34.3 pg    MCHC 32.3 31.4 - 37.4 g/dL    RDW 15.1 11.6 - 15.1 %    MPV 9.8 8.9 - 12.7 fL    Platelets 295 149 - 390 Thousands/uL    nRBC 0 /100 WBCs    Segmented % 50 43 - 75 %    Immature Grans % 0 0 - 2 %    Lymphocytes % 36 14 - 44 %    Monocytes % 13 (H) 4 - 12 %    Eosinophils Relative 0 0 - 6 %    Basophils Relative 1 0 - 1 %    Absolute Neutrophils 3.74 1.85 - 7.62 Thousands/µL    Absolute Immature Grans 0.03 0.00 - 0.20 Thousand/uL    Absolute Lymphocytes 2.73 0.60 - 4.47 Thousands/µL    Absolute Monocytes 0.94 0.17 - 1.22 Thousand/µL    Eosinophils Absolute 0.00 0.00 - 0.61 Thousand/µL    Basophils Absolute 0.06 0.00 - 0.10 Thousands/µL   C-reactive protein    Collection Time: 09/02/24  6:04 AM   Result Value Ref Range    CRP 6.1 (H) <3.0 mg/L   CK    Collection Time: 09/02/24  6:04 AM   Result Value Ref Range    Total CK 26 26 - 192 U/L   Fingerstick Glucose (POCT)    Collection Time: 09/02/24  7:34 AM   Result Value Ref Range    POC Glucose 103 65 - 140 mg/dl       Progress Toward Goals: continues to improve    Next of Kin  Extended Emergency Contact Information  Primary Emergency Contact: Chari Nowak  Mobile Phone: [phone]  Relation: Daughter  Secondary Emergency Contact: Conchita Hall  [place] Phone: [phone]  Mobile Phone:  [phone]  Relation: Other      Counseling / Coordination of Care  Patient's progress discussed with staff in treatment team meeting.  Medications, treatment progress and treatment plan reviewed with patient.  Medication education provided to patient.  Supportive therapy provided to patient.  Cognitive techniques utilized during the session.  Reassurance and supportive therapy provided.  Reoriented to reality and reassured.  Encouraged participation in milieu and group therapy on the unit.  Crisis/safety plan discussed with patient.       Dereje Banuelos MD  Attending Psychiatrist   [facility]       This note was completed in part utilizing Dragon dictation Software. Grammatical, translation, syntax errors, random word insertions, spelling mistakes, and incomplete sentences may be an occasional consequence of this system secondary to software limitations with voice recognition, ambient noise, and hardware issues. If you have any questions or concerns about the content, text, or information contained within the body of this dictation, please contact the provider for clarification.

## 2024-09-03 PROCEDURE — 99232 SBSQ HOSP IP/OBS MODERATE 35: CPT | Performed by: STUDENT IN AN ORGANIZED HEALTH CARE EDUCATION/TRAINING PROGRAM

## 2024-09-03 RX ADMIN — LORAZEPAM 0.5 MG: 0.5 TABLET ORAL at 08:13

## 2024-09-03 RX ADMIN — CARVEDILOL 6.25 MG: 6.25 TABLET, FILM COATED ORAL at 08:12

## 2024-09-03 RX ADMIN — LORAZEPAM 0.5 MG: 0.5 TABLET ORAL at 17:01

## 2024-09-03 RX ADMIN — SENNOSIDES AND DOCUSATE SODIUM 2 TABLET: 8.6; 5 TABLET ORAL at 21:13

## 2024-09-03 RX ADMIN — CLOZAPINE 500 MG: 100 TABLET ORAL at 21:13

## 2024-09-03 RX ADMIN — FLUTICASONE FUROATE 1 PUFF: 100 POWDER RESPIRATORY (INHALATION) at 08:14

## 2024-09-03 RX ADMIN — POLYETHYLENE GLYCOL 3350 17 G: 17 POWDER, FOR SOLUTION ORAL at 08:13

## 2024-09-03 RX ADMIN — Medication 15 ML: at 08:12

## 2024-09-03 RX ADMIN — ATORVASTATIN CALCIUM 10 MG: 10 TABLET, FILM COATED ORAL at 08:13

## 2024-09-03 RX ADMIN — FLUOXETINE 80 MG: 20 SOLUTION ORAL at 08:14

## 2024-09-03 RX ADMIN — CYANOCOBALAMIN TAB 1000 MCG 1000 MCG: 1000 TAB at 08:13

## 2024-09-03 RX ADMIN — NICOTINE 1 PATCH: 7 PATCH, EXTENDED RELEASE TRANSDERMAL at 08:25

## 2024-09-03 RX ADMIN — FAMOTIDINE 20 MG: 20 TABLET ORAL at 08:13

## 2024-09-03 RX ADMIN — MELATONIN TAB 3 MG 3 MG: 3 TAB at 21:13

## 2024-09-03 RX ADMIN — CARVEDILOL 6.25 MG: 6.25 TABLET, FILM COATED ORAL at 16:37

## 2024-09-03 RX ADMIN — FAMOTIDINE 20 MG: 20 TABLET ORAL at 17:01

## 2024-09-03 NOTE — PROGRESS NOTES
09/03/24 1000   Activity/Group Checklist   Group Other (Comment)  (Community meeting: focus and acceptance)   Attendance Attended;Other (Comment)  (left early)   Attendance Duration (min) 31-45   Interactions Other (Comment)  (scant)   Affect/Mood Other (Comment)  (restless)   Goals Achieved Identified feelings;Identified triggers;Identified relapse prevention strategies;Able to listen to others;Discussed coping strategies

## 2024-09-03 NOTE — PROGRESS NOTES
09/03/24 1412   Team Meeting   Meeting Type Daily Rounds   Initial Conference Date 09/03/24   Team Members Present   Team Members Present Nurse;Physician;;   Physician Team Member Vin/Franky/Toney   Nursing Team Member Dona   Care Management Team Member Jason   Social Work Team Member Ayse   Patient/Family Present   Patient Present No   Patient's Family Present No     Patient is endorsing anxiety. She is on daily weights and blood pressure checks. She is eating and sleeping well. She is compliant but disorganized. Patient was seen praying in the nude yesterday. Discharge is pending stabilization of mood.

## 2024-09-03 NOTE — NURSING NOTE
"Pt withdrawn to room throughout the evening hours. On approach, pt presented calm and cooperative but endorses \"little bit of anxiety\". Medication compliant and Q7 safety checks maintained.   "

## 2024-09-03 NOTE — PLAN OF CARE
Pt attends group and visible.     Problem: Alteration in Thoughts and Perception  Goal: Attend and participate in unit activities, including therapeutic, recreational, and educational groups  Description: Interventions:  -Encourage Visitation and family involvement in care  9/3/2024 1528 by Ayse Chaves LCSW  Outcome: Progressing

## 2024-09-03 NOTE — NURSING NOTE
Pt visible intermittently on the unit this shift . Pt is medication and meals complaint this shift .  Pt reports some anxiety and denies all other psych s/s. Will maintain q 7 mins checks.

## 2024-09-03 NOTE — PLAN OF CARE
Problem: Alteration in Thoughts and Perception  Goal: Treatment Goal: Gain control of psychotic behaviors/thinking, reduce/eliminate presenting symptoms and demonstrate improved reality functioning upon discharge  Outcome: Progressing  Goal: Verbalize thoughts and feelings  Description: Interventions:  - Promote a nonjudgmental and trusting relationship with the patient through active listening and therapeutic communication  - Assess patient's level of functioning, behavior and potential for risk  - Engage patient in 1 on 1 interactions  - Encourage patient to express fears, feelings, frustrations, and discuss symptoms    - [place] patient to reality, help patient recognize reality-based thinking   - Administer medications as ordered and assess for potential side effects  - Provide the patient education related to the signs and symptoms of the illness and desired effects of prescribed medications  Outcome: Progressing  Goal: Refrain from acting on delusional thinking/internal stimuli  Description: Interventions:  - Monitor patient closely, per order   - Utilize least restrictive measures   - Set reasonable limits, give positive feedback for acceptable   - Administer medications as ordered and monitor of potential side effects  Outcome: Progressing  Goal: Agree to be compliant with medication regime, as prescribed and report medication side effects  Description: Interventions:  - Offer appropriate PRN medication and supervise ingestion; conduct AIMS, as needed   Outcome: Progressing  Goal: Attend and participate in unit activities, including therapeutic, recreational, and educational groups  Description: Interventions:  -Encourage Visitation and family involvement in care  Outcome: Progressing  Goal: Recognize dysfunctional thoughts, communicate reality-based thoughts at the time of discharge  Description: Interventions:  - Provide medication and psycho-education to assist patient in compliance and developing  insight into his/her illness   Outcome: Progressing  Goal: Complete daily ADLs, including personal hygiene independently, as able  Description: Interventions:  - Observe, teach, and assist patient with ADLS  - Monitor and promote a balance of rest/activity, with adequate nutrition and elimination   Outcome: Progressing     Problem: Depression  Goal: Treatment Goal: Demonstrate behavioral control of depressive symptoms, verbalize feelings of improved mood/affect, and adopt new coping skills prior to discharge  Outcome: Progressing  Goal: Verbalize thoughts and feelings  Description: Interventions:  - Assess and re-assess patient's level of risk   - Engage patient in 1:1 interactions, daily, for a minimum of 15 minutes   - Encourage patient to express feelings, fears, frustrations, hopes   Outcome: Progressing  Goal: Refrain from isolation  Description: Interventions:  - Develop a trusting relationship   - Encourage socialization   Outcome: Progressing  Goal: Refrain from self-neglect  Outcome: Progressing  Goal: Attend and participate in unit activities, including therapeutic, recreational, and educational groups  Description: Interventions:  - Provide therapeutic and educational activities daily, encourage attendance and participation, and document same in the medical record   Outcome: Progressing     Problem: Anxiety  Goal: Anxiety is at manageable level  Description: Interventions:  - Assess and monitor patient's anxiety level.   - Monitor for signs and symptoms (heart palpitations, chest pain, shortness of breath, headaches, nausea, feeling jumpy, restlessness, irritable, apprehensive).   - Collaborate with interdisciplinary team and initiate plan and interventions as ordered.  - [place] patient to unit/surroundings  - Explain treatment plan  - Encourage participation in care  - Encourage verbalization of concerns/fears  - Identify coping mechanisms  - Assist in developing anxiety-reducing skills  -  Administer/offer alternative therapies  - Limit or eliminate stimulants  Outcome: Progressing

## 2024-09-03 NOTE — PROGRESS NOTES
Progress Note - Behavioral Health   Meli Nowak 59 y.o. female MRN: [id number]  Unit/Bed#: [facility] 651-01 Encounter: [id number]    Patient was seen today for continuation of care, records reviewed and patient was discussed with the morning case review team.    Meli was seen today for psychiatric follow-up.  On assessment today, Meli was visible in the day room.  Still religiously somatically preoccupied at times, patient is less paranoid and states that she feels safe here.  No overt delusions nor AVH endorsed, patient does at times appear preoccupied.  Clozaril currently prescribed at 500 mg nightly due to daytime sedation, weekly labs obtained yesterday and remain WNL.  Prozac also increased to 80 mg daily to deal with depressive symptoms and anhedonia.  No adverse effects noted on exam reported by patient.  Patient psychiatric symptoms continued to improve as she approaches her baseline.  Tentative discharge ongoing as patient will benefit from CRR referral due to inability to safely function in community and need for medication supervision.    Meli denies acute suicidal/self-harm ideation/intent/plan upon direct inquiry at this time.  Meli remains behaviorally appropriate, no agitation or aggression noted on exam or in report.  Impulse control is intact.  Meli remains adherent to her current psychotropic medication regimen and denies any side effects from medications, as well as none noted on exam.    Vitals:  Vitals:    09/03/24 0733   BP: 124/62   Pulse: 97   Resp: 18   Temp: (!) 97.3 °F (36.3 °C)   SpO2: 95%       Laboratory Results:  I have personally reviewed all pertinent laboratory/tests results.    Psychiatric Review of Systems:  Behavior over the last 24 hours:  unchanged.   Sleep: good  Appetite: good  Medication side effects: none  ROS: no complaints, denies shortness of breath or chest pain and all other systems are negative for acute changes    Mental Status Evaluation:  Appearance:  dressed  appropriately, adequate grooming   Behavior:  cooperative, bizarre, less guarded, better eye contact   Speech:  normal rate and volume   Mood:  less anxious   Affect:  constricted, slightly brighter   Thought Process:  illogical, concrete   Thought Content:  Taoism and somatic preoccupation, some paranoia, ruminating thoughts   Perceptual Disturbances: appears preoccupied, talks to self at times   Risk Potential: Suicidal ideation - None  Homicidal ideation - None  Potential for aggression - No   Memory:  recent and remote memory grossly intact   Sensorium  person, place, and time/date      Consciousness:  alert and awake   Attention: attention span and concentration are age appropriate   Insight:  limited   Judgment: limited   Gait/Station: normal gait/station   Motor Activity: no abnormal movements   Progress Toward Goals:   Meli is progressing towards goals of inpatient psychiatric treatment by continued medication compliance and is attending therapeutic modalities on the milieu. However, the patient continues to require inpatient psychiatric hospitalization for continued medication management and titration to optimize symptom reduction, improve sleep hygiene, and demonstrate adequate self-care.    Assessment & Plan   Principal Problem:    Schizoaffective disorder, bipolar type (HCC)  Active Problems:    Medical clearance for psychiatric admission    Type 2 diabetes mellitus (HCC)    Obesity    Tobacco abuse    Hyperlipidemia    Esophageal reflux    Essential (primary) hypertension    Vitamin B12 deficiency    Hypoxia    Ambulatory dysfunction    Mild protein-calorie malnutrition (HCC)      Recommended Treatment: Treatment plan and medication changes discussed and per the attending physician the plan is:    1.Continue with group therapy, milieu therapy and occupational therapy  2.Behavioral Health checks every 7 minutes  3.Continue frequent safety checks and vitals per unit protocol  4.Continue with SLIM  medical management as indicated  5.Continue with current medication regimen  6.Will review labs in the a.m.  7.Disposition Planning: Discharge planning and efforts remain ongoing    Behavioral Health Medications: all current active meds have been reviewed and continue current psychiatric medications.  Current Facility-Administered Medications   Medication Dose Route Frequency Provider Last Rate    acetaminophen  650 mg Oral Q4H PRN Marie Ziegler, CHRISTOPHERNP      acetaminophen  650 mg Oral Q4H PRN Marie Ziegler, CHRISTOPHERNP      acetaminophen  975 mg Oral Q6H PRN JOSE ELIAS Figueroa      aluminum-magnesium hydroxide-simethicone  30 mL Oral Q4H PRN JOSE ELIAS Puri      Artificial Tears  1 drop Both Eyes Q6H PRN Dereje Banuelos MD      atorvastatin  10 mg Oral Daily Marie Ziegler, JOSE ELIAS      bisacodyl  10 mg Rectal Daily PRN Marie Ziegler, JOSE ELIAS      carvedilol  6.25 mg Oral BID With Meals JOSE ELIAS Figueroa      cloZAPine  500 mg Oral HS Marie Ziegler, JOSE ELIAS      cyanocobalamin  1,000 mcg Oral Daily Marie Ziegler, JOSE ELIAS      famotidine  20 mg Oral BID Shan Fitzgerald DO      FLUoxetine  80 mg Oral Daily Marie Ziegler, JOSE ELIAS      fluticasone  1 puff Inhalation Daily JOSE ELIAS Figueroa      hydrOXYzine HCL  25 mg Oral Q6H PRN Max 4/day Marie Ziegler, JOSE ELIAS      hydrOXYzine HCL  50 mg Oral Q6H PRN Max 4/day JOSE ELIAS Figueroa      ipratropium-albuterol  3 mL Nebulization Q4H PRN Darin Lawton MD      LORazepam  0.5 mg Oral BID JOSE ELIAS Figueroa      Or    LORazepam  0.5 mg Intramuscular BID Marie Ziegler, JOSE ELIAS      LORazepam  1 mg Intramuscular Q6H PRN Max 3/day Marie Ziegler, JOSE ELIAS      LORazepam  1 mg Oral Q6H PRN Max 3/day JOSE ELIAS Figueroa      melatonin  3 mg Oral HS Marie Ziegler, JOSE ELIAS      Multivitamin  15 mL Oral Daily Marie Ziegler, JOSE ELIAS      nicotine  1 patch Transdermal Daily Marie Ziegler, JOSE ELIAS      OLANZapine  2.5 mg Oral Q6H PRN Dereje Banuelos MD      Or    OLANZapine   2.5 mg Intramuscular Q6H PRN Dereje Banuelos MD      OLANZapine  5 mg Oral Q6H PRN Dereje Banuelos MD      Or    OLANZapine  5 mg Intramuscular Q6H PRN Dereje Banuelos MD      ondansetron  4 mg Oral Q6H PRN Darin Lawton MD      polyethylene glycol  17 g Oral Daily PRN JOSE ELIAS Figueroa      polyethylene glycol  17 g Oral Daily JOSE ELIAS Ortega      senna-docusate sodium  2 tablet Oral HS Rehana Borrego PA-C      traZODone  50 mg Oral HS PRN JOSE ELIAS Figueroa         Risks / Benefits of Treatment:  Risks, benefits, and possible side effects of medications explained to patient and patient verbalizes understanding and agreement for treatment.    Counseling / Coordination of Care:  Patient's progress reviewed with nursing staff.  Medications, treatment progress and treatment plan reviewed with patient.  Supportive counseling provided to the patient.    Total floor/unit time spent today 25 minutes. Greater than 50% of total time was spent with the patient and / or family counseling and / or coordination of care. A description of the counseling / coordination of care: medication education, treatment plan, supportive therapy.    This note was completed in part utilizing Dragon dictation Software. Grammatical, translation, syntax errors, random word insertions, spelling mistakes, and incomplete sentences may be an occasional consequence of this system secondary to software limitations with voice recognition, ambient noise, and hardware issues. If you have any questions or concerns about the content, text, or information contained within the body of this dictation, please contact the provider for clarification

## 2024-09-03 NOTE — PROGRESS NOTES
09/03/24 1100 09/03/24 1300   Activity/Group Checklist   Group Other (Comment)  (art focus task) Pet therapy   Attendance Attended Attended   Attendance Duration (min) 0-15 16-30   Interactions Other (Comment)  (pt. in and out of session and did not begin an art task.) Interacted appropriately  (Pt. very interactive with petting the dog in the halls and in the group room again.)   Affect/Mood Constricted Appropriate;Bright   Goals Achieved Other (Comment)  (Pt displayed timid behavior and mininal interested in engaging in the group.) Able to engage in interactions

## 2024-09-04 PROCEDURE — 99232 SBSQ HOSP IP/OBS MODERATE 35: CPT | Performed by: STUDENT IN AN ORGANIZED HEALTH CARE EDUCATION/TRAINING PROGRAM

## 2024-09-04 RX ORDER — FLUOXETINE 20 MG/5ML
120 SOLUTION ORAL DAILY
Status: DISCONTINUED | OUTPATIENT
Start: 2024-09-05 | End: 2024-09-23

## 2024-09-04 RX ADMIN — POLYETHYLENE GLYCOL 3350 17 G: 17 POWDER, FOR SOLUTION ORAL at 09:05

## 2024-09-04 RX ADMIN — LORAZEPAM 0.5 MG: 0.5 TABLET ORAL at 17:08

## 2024-09-04 RX ADMIN — FLUOXETINE 80 MG: 20 SOLUTION ORAL at 09:05

## 2024-09-04 RX ADMIN — SENNOSIDES AND DOCUSATE SODIUM 2 TABLET: 8.6; 5 TABLET ORAL at 21:09

## 2024-09-04 RX ADMIN — FLUTICASONE FUROATE 1 PUFF: 100 POWDER RESPIRATORY (INHALATION) at 09:07

## 2024-09-04 RX ADMIN — HYDROXYZINE HYDROCHLORIDE 50 MG: 50 TABLET, FILM COATED ORAL at 19:27

## 2024-09-04 RX ADMIN — LORAZEPAM 0.5 MG: 0.5 TABLET ORAL at 09:06

## 2024-09-04 RX ADMIN — MELATONIN TAB 3 MG 3 MG: 3 TAB at 21:09

## 2024-09-04 RX ADMIN — CARVEDILOL 6.25 MG: 6.25 TABLET, FILM COATED ORAL at 16:38

## 2024-09-04 RX ADMIN — FAMOTIDINE 20 MG: 20 TABLET ORAL at 09:06

## 2024-09-04 RX ADMIN — CLOZAPINE 500 MG: 100 TABLET ORAL at 21:09

## 2024-09-04 RX ADMIN — Medication 15 ML: at 09:05

## 2024-09-04 RX ADMIN — CYANOCOBALAMIN TAB 1000 MCG 1000 MCG: 1000 TAB at 09:06

## 2024-09-04 RX ADMIN — CARVEDILOL 6.25 MG: 6.25 TABLET, FILM COATED ORAL at 09:06

## 2024-09-04 RX ADMIN — FAMOTIDINE 20 MG: 20 TABLET ORAL at 17:08

## 2024-09-04 RX ADMIN — ATORVASTATIN CALCIUM 10 MG: 10 TABLET, FILM COATED ORAL at 09:06

## 2024-09-04 RX ADMIN — DEXTRAN 70, GLYCERIN, HYPROMELLOSE 1 DROP: 1; 2; 3 SOLUTION/ DROPS OPHTHALMIC at 14:00

## 2024-09-04 NOTE — PROGRESS NOTES
"Progress Note - Behavioral Health   Meli Nowak 59 y.o. female MRN: [id number]  Unit/Bed#: [facility] 651-01 Encounter: [id number]    Patient was seen today for continuation of care, records reviewed and patient was discussed with the morning case review team.  As per nursing, patient has been eating well although she does report interrupted sleep secondary to a distressing dream.    Meli was seen today for psychiatric follow-up.  On assessment today, Meli was seen lying in bed.  Stating that she was \"tired\" today, patient only willing to have brief interview before terminating conversation.  Still paranoid at times, she is more redirectable and noted to be more visible on the unit as well as attend groups when prompted.  Directly questioned about her dream whereas she stated \" 5 guys came into my room and I thought they were going to rape me\".  Acknowledging that this was in fact a dream, patient is more able to distinguish reality and shows greater insight into current illness.  Clozaril continued at 500 mg nightly to assist with schizoaffective disorder, we will also increase Prozac to 120 mg daily to assist with depressive symptoms.  Clozaril level 668 on 8/26/2024, will also order for this Monday with weekly labs including CBC, CK and C-reactive protein.  Bowel regimen maintained secondary to increased risk of constipation with Clozaril.  Tentative discharge ongoing as patient will benefit from CRR referral due to  history of noncompliance and inability to safely function in community without medication supervision.    Meli denies acute suicidal/self-harm ideation/intent/plan upon direct inquiry at this time.  Meli remains behaviorally appropriate, no agitation or aggression noted on exam or in report.  Meli also denies HI/AH/VH, and does not appear overtly manic.  No overt delusions or paranoia are verbalized. Impulse control is intact.  Meli remains adherent to her current psychotropic medication regimen and " denies any side effects from medications, as well as none noted on exam.    Vitals:  Vitals:    09/04/24 0723   BP: 145/68   Pulse: 94   Resp: 18   Temp: 98.7 °F (37.1 °C)   SpO2: 97%       Laboratory Results:  I have personally reviewed all pertinent laboratory/tests results.    Psychiatric Review of Systems:  Behavior over the last 24 hours:  unchanged.   Sleep: interrupted  Appetite: good  Medication side effects: none  ROS: no complaints, denies shortness of breath or chest pain and all other systems are negative for acute changes    Mental Status Evaluation:  Appearance:  disheveled, marginal hygiene, looks older than stated age   Behavior:  cooperative, bizarre, guarded   Speech:  normal rate and volume   Mood:  anxious   Affect:  constricted   Thought Process:  illogical, more organized   Thought Content:  Confucianism and somatic preoccupation, paranoid ideation, negative thoughts, ruminating thoughts   Perceptual Disturbances: appears preoccupied, talks to self at times   Risk Potential: Suicidal ideation - None at present  Homicidal ideation - None  Potential for aggression - No   Memory:  recent and remote memory grossly intact   Sensorium  person, place, and time/date      Consciousness:  alert and awake   Attention: attention span and concentration are age appropriate   Insight:  limited   Judgment: limited   Gait/Station: normal gait/station   Motor Activity: no abnormal movements   Progress Toward Goals:   Meli is progressing towards goals of inpatient psychiatric treatment by continued medication compliance and is attending therapeutic modalities on the milieu. However, the patient continues to require inpatient psychiatric hospitalization for continued medication management and titration to optimize symptom reduction, improve sleep hygiene, and demonstrate adequate self-care.    Assessment & Plan   Principal Problem:    Schizoaffective disorder, bipolar type (HCC)  Active Problems:    Medical clearance  for psychiatric admission    Type 2 diabetes mellitus (HCC)    Obesity    Tobacco abuse    Hyperlipidemia    Esophageal reflux    Essential (primary) hypertension    Vitamin B12 deficiency    Hypoxia    Ambulatory dysfunction    Mild protein-calorie malnutrition (HCC)      Recommended Treatment: Treatment plan and medication changes discussed and per the attending physician the plan is:    1.Continue with group therapy, milieu therapy and occupational therapy  2.Behavioral Health checks every 7 minutes  3.Continue frequent safety checks and vitals per unit protocol  4.Continue with SLIM medical management as indicated  5.Continue with current medication regimen  6.Will review labs in the a.m.  7.Disposition Planning: Discharge planning and efforts remain ongoing    Behavioral Health Medications: all current active meds have been reviewed and continue current psychiatric medications.  Current Facility-Administered Medications   Medication Dose Route Frequency Provider Last Rate    acetaminophen  650 mg Oral Q4H PRN JOSE ELIAS Figueroa      acetaminophen  650 mg Oral Q4H PRN JOSE ELIAS Figueroa      acetaminophen  975 mg Oral Q6H PRN JOSE ELIAS Figueroa      aluminum-magnesium hydroxide-simethicone  30 mL Oral Q4H PRN JOSE ELIAS Puri      Artificial Tears  1 drop Both Eyes Q6H PRN Dereje Banuelos MD      atorvastatin  10 mg Oral Daily JOSE ELIAS Figureoa      bisacodyl  10 mg Rectal Daily PRN JOSE ELIAS Figueroa      carvedilol  6.25 mg Oral BID With Meals JOSE ELIAS Figueroa      cloZAPine  500 mg Oral HS JOSE ELIAS Figueroa      cyanocobalamin  1,000 mcg Oral Daily JOSE ELIAS Figueroa      famotidine  20 mg Oral BID Shan Fitzgerald DO      [START ON 9/5/2024] FLUoxetine  120 mg Oral Daily JOSE ELIAS Figueroa      fluticasone  1 puff Inhalation Daily JOSE ELIAS Figueroa      hydrOXYzine HCL  25 mg Oral Q6H PRN Max 4/day JOSE ELIAS Figueroa      hydrOXYzine HCL  50 mg Oral Q6H PRN Max  4/day JOSE ELIAS Figueroa      ipratropium-albuterol  3 mL Nebulization Q4H PRN Darin Lawton MD      LORazepam  0.5 mg Oral BID JOSE ELIAS Figueroa      Or    LORazepam  0.5 mg Intramuscular BID JOSE ELIAS Figueroa      LORazepam  1 mg Intramuscular Q6H PRN Max 3/day Marie Ziegler, JOSE ELIAS      LORazepam  1 mg Oral Q6H PRN Max 3/day JOSE ELIAS Figueroa      melatonin  3 mg Oral HS JOSE ELIAS Figueroa      Multivitamin  15 mL Oral Daily JOSE ELIAS Figueroa      nicotine  1 patch Transdermal Daily JOSE ELIAS Figueroa      OLANZapine  2.5 mg Oral Q6H PRN Dereje Banuelos MD      Or    OLANZapine  2.5 mg Intramuscular Q6H PRN Dereje Banuelos MD      OLANZapine  5 mg Oral Q6H PRN Dereje Banuelos MD      Or    OLANZapine  5 mg Intramuscular Q6H PRN Dereje aBnuelos MD      ondansetron  4 mg Oral Q6H PRN Darin Lawton MD      polyethylene glycol  17 g Oral Daily PRN JOSE ELIAS Figueroa      polyethylene glycol  17 g Oral Daily JOSE ELIAS Ortega      senna-docusate sodium  2 tablet Oral HS Rehana Borrego PA-C      traZODone  50 mg Oral HS PRN JOSE ELIAS Figueroa         Risks / Benefits of Treatment:  Risks, benefits, and possible side effects of medications explained to patient and patient verbalizes understanding and agreement for treatment.    Counseling / Coordination of Care:  Patient's progress reviewed with nursing staff.  Medications, treatment progress and treatment plan reviewed with patient.  Supportive counseling provided to the patient.    Total floor/unit time spent today 25 minutes. Greater than 50% of total time was spent with the patient and / or family counseling and / or coordination of care. A description of the counseling / coordination of care: medication education, treatment plan, supportive therapy.    This note was completed in part utilizing Dragon dictation Software. Grammatical, translation, syntax errors, random word insertions, spelling mistakes, and  incomplete sentences may be an occasional consequence of this system secondary to software limitations with voice recognition, ambient noise, and hardware issues. If you have any questions or concerns about the content, text, or information contained within the body of this dictation, please contact the provider for clarification

## 2024-09-04 NOTE — PLAN OF CARE
Problem: Prexisting or High Potential for Compromised Skin Integrity  Goal: Skin integrity is maintained or improved  Description: INTERVENTIONS:  - Identify patients at risk for skin breakdown  - Assess and monitor skin integrity  - Assess and monitor nutrition and hydration status  - Monitor labs   - Assess for incontinence   - Turn and reposition patient  - Assist with mobility/ambulation  - Relieve pressure over bony prominences  - Avoid friction and shearing  - Provide appropriate hygiene as needed including keeping skin clean and dry  - Evaluate need for skin moisturizer/barrier cream  - Collaborate with interdisciplinary team   - Patient/family teaching  - Consider wound care consult   Outcome: Progressing     Problem: Alteration in Thoughts and Perception  Goal: Treatment Goal: Gain control of psychotic behaviors/thinking, reduce/eliminate presenting symptoms and demonstrate improved reality functioning upon discharge  Outcome: Progressing     Problem: Alteration in Thoughts and Perception  Goal: Verbalize thoughts and feelings  Description: Interventions:  - Promote a nonjudgmental and trusting relationship with the patient through active listening and therapeutic communication  - Assess patient's level of functioning, behavior and potential for risk  - Engage patient in 1 on 1 interactions  - Encourage patient to express fears, feelings, frustrations, and discuss symptoms    - [place] patient to reality, help patient recognize reality-based thinking   - Administer medications as ordered and assess for potential side effects  - Provide the patient education related to the signs and symptoms of the illness and desired effects of prescribed medications  Outcome: Progressing     Problem: Alteration in Thoughts and Perception  Goal: Agree to be compliant with medication regime, as prescribed and report medication side effects  Description: Interventions:  - Offer appropriate PRN medication and supervise  ingestion; conduct AIMS, as needed   Outcome: Progressing     Problem: Alteration in Thoughts and Perception  Goal: Attend and participate in unit activities, including therapeutic, recreational, and educational groups  Description: Interventions:  -Encourage Visitation and family involvement in care  Outcome: Progressing     Problem: Alteration in Thoughts and Perception  Goal: Complete daily ADLs, including personal hygiene independently, as able  Description: Interventions:  - Observe, teach, and assist patient with ADLS  - Monitor and promote a balance of rest/activity, with adequate nutrition and elimination   Outcome: Progressing     Problem: Ineffective Coping  Goal: Participates in unit activities  Description: Interventions:  - Provide therapeutic environment   - Provide required programming   - Redirect inappropriate behaviors   Outcome: Progressing

## 2024-09-04 NOTE — TREATMENT TEAM
09/04/24 1927   Powell Anxiety Scale   Anxious Mood 4   Tension 2   Fears 2   Insomnia 0   Intellectual 3   Depressed Mood 2   Somatic Complaints: Muscular 2   Somatic Complaints: Sensory 2   Cardiovascular Symptoms 0   Respiratory Symptoms 0   Gastrointestinal Symptoms 0   Genitourinary Symptoms 0   Autonomic Symptoms 3   Behavior at Interview 3   Powell Anxiety Score 23     PRN Atarax 50 mg given at 1927 for anxiety.

## 2024-09-04 NOTE — NURSING NOTE
Patient visible in the unit and seen socializing with her peers. She is cooperative and compliant with medications. Patient does endorse anxiety and denies depression, SI and AVH.

## 2024-09-04 NOTE — PROGRESS NOTES
09/04/24 0817   Team Meeting   Meeting Type Daily Rounds   Initial Conference Date 09/04/24   Next Conference Date 09/05/24   Team Members Present   Team Members Present Physician;Nurse;;   Physician Team Member Dr Banuelos, JAIR Licea   Nursing Team Member Dona   Care Management Team Member Anum   Social Work Team Member Ayse   Patient/Family Present   Patient Present No   Patient's Family Present No     Eating meals, daily weights I & O's, paranoid and understands she is having a dream, increasing prozac to 120 mg, CRR possible.
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
## 2024-09-04 NOTE — NURSING NOTE
"Patient has been mostly isolative to her room this shift.  She is stating \"I'm so tired\".  Patient is noted to be sleeping on and off through this shift.  She continues with paranoid thoughts regarding her medications.  She endorses anxiety bur denies depression.  She is currently denying SI/HI and AH/VH to this writer this shift.  Patient ate 100% of breakfast but refused to eat lunch.  Safety precautions maintained.  "

## 2024-09-04 NOTE — PHYSICAL THERAPY NOTE
PHYSICAL THERAPY NOTE    Patient Name: Meli Nowak  Today's Date: 9/4/2024    Pt goals to be renewed. Upon assessment pt found to have met all prior goals and is ambulating at ind level without an assistive device. At this time inpt PT will be D/C pt from caseload. Continue to recommend home vs OP PT at D/C.    Murray Crawford PT, DPT

## 2024-09-04 NOTE — PROGRESS NOTES
Pt attempted afternoon group (late).  Pt was bright and able to self express.  Pt did note she would like to color Fall coloring pages as coping skill.  Provided pt a journal and calendar,.  Encouraged pt to attend groups and reviewed group schedule.  Coloring pages were provided to pt.  Pt in hospital gown and disheveled hair.      09/04/24 1330   Activity/Group Checklist   Group Other (Comment)  (Communication and boundaries)   Attendance Attended;Other (Comment)  (late)   Attendance Duration (min) 46-60   Interactions Other (Comment)  (needed prompting)   Affect/Mood Bright;Other (Comment)  (dressed in hospital gown)   Goals Achieved Identified triggers;Identified feelings;Identified relapse prevention strategies;Able to listen to others;Able to engage in interactions

## 2024-09-05 PROCEDURE — 99232 SBSQ HOSP IP/OBS MODERATE 35: CPT | Performed by: STUDENT IN AN ORGANIZED HEALTH CARE EDUCATION/TRAINING PROGRAM

## 2024-09-05 PROCEDURE — 92610 EVALUATE SWALLOWING FUNCTION: CPT

## 2024-09-05 RX ORDER — TRAZODONE HYDROCHLORIDE 50 MG/1
50 TABLET, FILM COATED ORAL
Status: DISCONTINUED | OUTPATIENT
Start: 2024-09-05 | End: 2024-12-30 | Stop reason: HOSPADM

## 2024-09-05 RX ADMIN — SENNOSIDES AND DOCUSATE SODIUM 2 TABLET: 8.6; 5 TABLET ORAL at 21:14

## 2024-09-05 RX ADMIN — CYANOCOBALAMIN TAB 1000 MCG 1000 MCG: 1000 TAB at 08:37

## 2024-09-05 RX ADMIN — FAMOTIDINE 20 MG: 20 TABLET ORAL at 17:20

## 2024-09-05 RX ADMIN — FAMOTIDINE 20 MG: 20 TABLET ORAL at 08:37

## 2024-09-05 RX ADMIN — TRAZODONE HYDROCHLORIDE 50 MG: 50 TABLET ORAL at 21:14

## 2024-09-05 RX ADMIN — POLYETHYLENE GLYCOL 3350 17 G: 17 POWDER, FOR SOLUTION ORAL at 08:36

## 2024-09-05 RX ADMIN — Medication 15 ML: at 08:36

## 2024-09-05 RX ADMIN — CARVEDILOL 6.25 MG: 6.25 TABLET, FILM COATED ORAL at 08:37

## 2024-09-05 RX ADMIN — LORAZEPAM 0.5 MG: 0.5 TABLET ORAL at 17:20

## 2024-09-05 RX ADMIN — ATORVASTATIN CALCIUM 10 MG: 10 TABLET, FILM COATED ORAL at 08:38

## 2024-09-05 RX ADMIN — NICOTINE 1 PATCH: 7 PATCH, EXTENDED RELEASE TRANSDERMAL at 08:36

## 2024-09-05 RX ADMIN — MELATONIN TAB 3 MG 3 MG: 3 TAB at 21:14

## 2024-09-05 RX ADMIN — CARVEDILOL 6.25 MG: 6.25 TABLET, FILM COATED ORAL at 17:20

## 2024-09-05 RX ADMIN — LORAZEPAM 0.5 MG: 0.5 TABLET ORAL at 08:37

## 2024-09-05 RX ADMIN — CLOZAPINE 500 MG: 100 TABLET ORAL at 21:14

## 2024-09-05 RX ADMIN — FLUTICASONE FUROATE 1 PUFF: 100 POWDER RESPIRATORY (INHALATION) at 08:38

## 2024-09-05 RX ADMIN — FLUOXETINE 120 MG: 20 SOLUTION ORAL at 09:46

## 2024-09-05 NOTE — NURSING NOTE
Patient withdrawn to her room and reported anxiety. PRN atarax was given and effective. Patient is cooperative and compliant with medications. She denies depression, SI and AVH.

## 2024-09-05 NOTE — PLAN OF CARE
Problem: Prexisting or High Potential for Compromised Skin Integrity  Goal: Skin integrity is maintained or improved  Description: INTERVENTIONS:  - Identify patients at risk for skin breakdown  - Assess and monitor skin integrity  - Assess and monitor nutrition and hydration status  - Monitor labs   - Assess for incontinence   - Turn and reposition patient  - Assist with mobility/ambulation  - Relieve pressure over bony prominences  - Avoid friction and shearing  - Provide appropriate hygiene as needed including keeping skin clean and dry  - Evaluate need for skin moisturizer/barrier cream  - Collaborate with interdisciplinary team   - Patient/family teaching  - Consider wound care consult   Outcome: Progressing     Problem: Ineffective Coping  Goal: Understands least restrictive measures  Description: Interventions:  - Utilize least restrictive behavior  Outcome: Progressing     Problem: Risk for Self Injury/Neglect  Goal: Verbalize thoughts and feelings  Description: Interventions:  - Assess and re-assess patient's lethality and potential for self-injury  - Engage patient in 1:1 interactions, daily, for a minimum of 15 minutes  - Encourage patient to express feelings, fears, frustrations, hopes  - Establish rapport/trust with patient   Outcome: Progressing

## 2024-09-05 NOTE — CASE MANAGEMENT
Called Conchita Hall ([phone]) with [facility] ICM, left a voicemail stating that the patient wanted some items to be brought in when her ICM comes in today or tomorrow if possible.    She is asking for:    pink and gray comforter with her pillow case  Sneakers and socks  Make-up

## 2024-09-05 NOTE — SPEECH THERAPY NOTE
Speech Pathology Bedside Swallow Evaluation:                    SLP RECOMMENDATIONS:         Diet: Regular consistency         Liquids: Thin liquids         Medications: as best tolerated         Strategies: upright, slow rate        Summary:  Pt seen for bedside swallow evaluation. Pt is known to ST services. Pt seen upright on EOB in room. Pt with improved alertness and verbal output compared to prior ST visits. Pt requesting regular consistency diet. Pt is currently on a Level 3 Dental soft/thin liquid diet.   Pt's oral mech/CN exam was grossly WNL. Pt observed with AM meds whole with thin liquids with functional manipulation of bolus and no overt s/s aspiration. Pt observed with thin liquids via cup sips with no overt s/s aspiration. Pt initially declined any solid food trials, however was ultimately agreeable to a few bites of solids with encouragement from ST. Pt demonstrated slow,  but functional mastication with regular solids (pretzels). No overt s/s aspiration observed with thin liquids. SLP reviewed general swallow precautions with pt.     Pt's mastication appears improved compared to prior ST services. Recommend diet advancement to Regular consistency/thin liquids with general swallow precautions. SLP to follow briefly for diet tolerance.       Therapy Prognosis: fair   Prognosis considerations: improved alertness   Frequency:1-2x      Vitals:    09/04/24 1547 09/04/24 2042 09/05/24 0356 09/05/24 0749   BP: 148/89 129/58  155/80   BP Location: Left arm Right arm  Left arm   Pulse: 92 71  78   Resp: 20 17  18   Temp: 97.7 °F (36.5 °C) 97.6 °F (36.4 °C)  97.9 °F (36.6 °C)   TempSrc: Temporal Temporal  Temporal   SpO2: 99% 93%  94%   Weight:   92.5 kg (203 lb 14.8 oz)    Height:         Lab Results   Component Value Date    WBC 7.50 09/02/2024    HGB 14.0 09/02/2024    HCT 43.4 09/02/2024    MCV 94 09/02/2024     09/02/2024         Consider consult w/:  Nutrition    Goal(s):  Pt will tolerate least  "restrictive diet w/out s/s aspiration or oral/pharyngeal difficulties.     H&P/Admit info/ pertinent provider notes: (PMH noted above)  \"History of Present Illness:     Meli Nowak is a 59 y.o. female with a PMH including schizoaffective disorder, bipolar type, T2DM, obesity, and reactive airway disease who is originally admitted to the psychiatric service due to catatonia. We are consulted for medical clearance for psychiatric hospitalization and medical management. Patient was initially admitted to [facility] last week. 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 She was transferred to  medical unit for further evaluation and treatment. She was maintain on Ativan for catatonia. Neurology was consulted and recommended LP and MRI brain for which patient was sent to [facility]. Workup thus far has been negative. Lyme and autoimmune are pending. Patient was medically stabilized and transferred back to [facility].\"    Special Studies:  CXR 7/24/24:  IMPRESSION:  No focal consolidation, pleural effusion, or pneumothorax.        Previous VBS:  None     Patient's goal: to improve diet     Did the pt report pain? No   If yes, was nursing notified/was it addressed?    Reason for consult:  Determine safest and least restrictive diet    Precautions:  Aspiration      Food allergies:  No Known Allergies     Current diet:  Dental soft/thin    Premorbid diet:  Regular/thin    O2 requirements:  RA   Voice/Speech:  Limited    Social:  Needs assist    Follows commands:  Variable    Cognitive status:  Impaired        Results d/w:  Pt, nursing, NP                     "

## 2024-09-05 NOTE — PROGRESS NOTES
"Progress Note - Behavioral Health   Meli Nowak 59 y.o. female MRN: [id number]  Unit/Bed#: [facility] 651-01 Encounter: [id number]    Patient was seen today for continuation of care, records reviewed and patient was discussed with the morning case review team.    Meli was seen today for psychiatric follow-up.  On assessment today, Meli was pleasant and cooperative.  Noted to be attending group today, patient is less paranoid and more visible on the unit.  Stating that sleep was interrupted last night as she felt she could not breathe, patient stated \" I could not breathe, how am I still alive?\".  As needed Atarax given at that time as well as scheduled Clozaril, patient reports mild relief with symptoms and states that she knows that she is still living.  Increased paranoia at times, Clozaril currently prescribed at 500 mg nightly, weekly labs obtained on Monday with last Clozaril level at 668.  Meds over objection also implemented, patient has been more compliant with no IM injections given.  Tentative discharge ongoing as patient will benefit from CRR referral.    Meli denies acute suicidal/self-harm ideation/intent/plan upon direct inquiry at this time.  Meli remains behaviorally appropriate, no agitation or aggression noted on exam or in report.  Meli also denies HI/AH/VH, and does not appear overtly manic.  No overt delusions or paranoia are verbalized. Impulse control is intact.  Meli remains adherent to her current psychotropic medication regimen and denies any side effects from medications, as well as none noted on exam.    Vitals:  Vitals:    09/05/24 0749   BP: 155/80   Pulse: 78   Resp: 18   Temp: 97.9 °F (36.6 °C)   SpO2: 94%       Laboratory Results:  I have personally reviewed all pertinent laboratory/tests results.    Psychiatric Review of Systems:  Behavior over the last 24 hours:  unchanged.   Sleep: interrupted  Appetite: good  Medication side effects: none  ROS: no complaints, denies shortness of " breath or chest pain and all other systems are negative for acute changes    Mental Status Evaluation:  Appearance:  disheveled, marginal hygiene, overweight   Behavior:  cooperative, bizarre, guarded   Speech:  slow   Mood:  anxious   Affect:  constricted   Thought Process:  illogical, more organized   Thought Content:  some paranoia, negative thoughts, ruminating thoughts   Perceptual Disturbances: less preoccupied   Risk Potential: Suicidal ideation - None  Homicidal ideation - None  Potential for aggression - No   Memory:  recent and remote memory grossly intact   Sensorium  person, place, and time/date      Consciousness:  alert and awake   Attention: attention span and concentration are age appropriate   Insight:  limited   Judgment: limited   Gait/Station: normal gait/station   Motor Activity: no abnormal movements   Progress Toward Goals:   Meli is progressing towards goals of inpatient psychiatric treatment by continued medication compliance and is attending therapeutic modalities on the milieu. However, the patient continues to require inpatient psychiatric hospitalization for continued medication management and titration to optimize symptom reduction, improve sleep hygiene, and demonstrate adequate self-care.    Assessment & Plan   Principal Problem:    Schizoaffective disorder, bipolar type (HCC)  Active Problems:    Medical clearance for psychiatric admission    Type 2 diabetes mellitus (HCC)    Obesity    Tobacco abuse    Hyperlipidemia    Esophageal reflux    Essential (primary) hypertension    Vitamin B12 deficiency    Hypoxia    Ambulatory dysfunction    Mild protein-calorie malnutrition (HCC)      Recommended Treatment: Treatment plan and medication changes discussed and per the attending physician the plan is:    1.Continue with group therapy, milieu therapy and occupational therapy  2.Behavioral Health checks every 7 minutes  3.Continue frequent safety checks and vitals per unit  protocol  4.Continue with SLIM medical management as indicated  5.Continue with current medication regimen  6.Will review labs in the a.m.  7.Disposition Planning: Discharge planning and efforts remain ongoing    Behavioral Health Medications: all current active meds have been reviewed and continue current psychiatric medications.  Current Facility-Administered Medications   Medication Dose Route Frequency Provider Last Rate    acetaminophen  650 mg Oral Q4H PRN Marie Ziegler, JOSE ELIAS      acetaminophen  650 mg Oral Q4H PRN Marie Ziegler, JOSE ELIAS      acetaminophen  975 mg Oral Q6H PRN JOSE ELIAS Figueroa      aluminum-magnesium hydroxide-simethicone  30 mL Oral Q4H PRN JOSE ELIAS Puri      Artificial Tears  1 drop Both Eyes Q6H PRN Dereje Banuelos MD      atorvastatin  10 mg Oral Daily Marie Ziegler, JOSE ELIAS      bisacodyl  10 mg Rectal Daily PRN JOSE ELIAS Figueroa      carvedilol  6.25 mg Oral BID With Meals JOSE ELIAS Figueroa      cloZAPine  500 mg Oral HS JOSE ELIAS Figueroa      cyanocobalamin  1,000 mcg Oral Daily JOSE ELIAS Figueroa      famotidine  20 mg Oral BID Shan Fitzgerald DO      FLUoxetine  120 mg Oral Daily Marie Ziegler, JOSE ELIAS      fluticasone  1 puff Inhalation Daily JOSE ELIAS Figueroa      hydrOXYzine HCL  25 mg Oral Q6H PRN Max 4/day Marie Ziegler, JOSE ELIAS      hydrOXYzine HCL  50 mg Oral Q6H PRN Max 4/day JOSE ELIAS Figueroa      ipratropium-albuterol  3 mL Nebulization Q4H PRN Darin Lawton MD      LORazepam  0.5 mg Oral BID JOSE ELIAS Figueroa      Or    LORazepam  0.5 mg Intramuscular BID Marie Ziegler, JOSE ELIAS      LORazepam  1 mg Intramuscular Q6H PRN Max 3/day JOSE ELIAS Figueroa      LORazepam  1 mg Oral Q6H PRN Max 3/day JOSE ELIAS Figueroa      melatonin  3 mg Oral HS JOSE ELIAS Figueroa      Multivitamin  15 mL Oral Daily JOSE ELIAS Figueroa      nicotine  1 patch Transdermal Daily JOSE ELIAS Figueroa      OLANZapine  2.5 mg Oral Q6H PRN Dereje  MD Vin      Or    OLANZapine  2.5 mg Intramuscular Q6H PRN Dereje Banuelos MD      OLANZapine  5 mg Oral Q6H PRN Dereje Banuelos MD      Or    OLANZapine  5 mg Intramuscular Q6H PRN Dereje Banuelos MD      ondansetron  4 mg Oral Q6H PRN Darin Lawton MD      polyethylene glycol  17 g Oral Daily PRN JOSE ELIAS Figueroa      polyethylene glycol  17 g Oral Daily JOSE ELIAS Ortega      senna-docusate sodium  2 tablet Oral HS Rehana Borrego PA-C      traZODone  50 mg Oral HS PRN JOSE ELIAS Figueroa         Risks / Benefits of Treatment:  Risks, benefits, and possible side effects of medications explained to patient and patient verbalizes understanding and agreement for treatment.    Counseling / Coordination of Care:  Patient's progress reviewed with nursing staff.  Medications, treatment progress and treatment plan reviewed with patient.  Supportive counseling provided to the patient.    Total floor/unit time spent today 25 minutes. Greater than 50% of total time was spent with the patient and / or family counseling and / or coordination of care. A description of the counseling / coordination of care: medication education, treatment plan, supportive therapy.    This note was completed in part utilizing Dragon dictation Software. Grammatical, translation, syntax errors, random word insertions, spelling mistakes, and incomplete sentences may be an occasional consequence of this system secondary to software limitations with voice recognition, ambient noise, and hardware issues. If you have any questions or concerns about the content, text, or information contained within the body of this dictation, please contact the provider for clarification

## 2024-09-05 NOTE — PROGRESS NOTES
09/05/24 0759   Team Meeting   Meeting Type Daily Rounds   Initial Conference Date 09/05/24   Next Conference Date 09/06/24   Team Members Present   Team Members Present Physician;Nurse;;   Physician Team Member Dr Banuelos, JAIR Licea   Nursing Team Member Dona   Care Management Team Member Anum   Social Work Team Member Ayse   Patient/Family Present   Patient Present No   Patient's Family Present No     Took multiple showers last night, attended one group, prozac increased yesterday, slept, speech eval changed to regular, atarax for anxiety last night.

## 2024-09-05 NOTE — PROGRESS NOTES
Pt attended group.  Pt more visible and spontaneous.  Tangential and wearing hospital gown, disheveled hair.  Pt focused on what she could not eat and staling she was trying to eat bread from other patients trays.  Reminded pt to follow diet needs and notified Nursing.       09/05/24 1000   Activity/Group Checklist   Group Other (Comment)  (Community meeting: self improvement goals and affirmations)   Attendance Attended   Attendance Duration (min) 31-45   Interactions Other (Comment)  (tangential about menu)   Affect/Mood Wide   Goals Achieved Identified feelings;Identified triggers;Identified relapse prevention strategies;Able to listen to others;Able to engage in interactions;Able to reflect/comment on own behavior;Able to manage/cope with feelings;Able to self-disclose;Verbalized increased hopefulness

## 2024-09-05 NOTE — NURSING NOTE
"Patient is paranoid this morning. Upon approach, patient is laying in bed and tells this writer \"I need an Ativan. I don't feel like I'm in my body.\" Patient provided with medications and reassured. She wrote a note stating \"I feel very anxiety ridden. I am so hungry but afraid to eat. There is something in the food.\" Patient states she feels like she is not breathing and should be dead. She admits to hearing voices telling her not to eat and not to drink. Patient encouraged to eat breakfast and spend time out of her room with peers. She continues to refuse breakfast but is agreeable to sit in the dayroom and is drinking water at this time.   "

## 2024-09-06 PROCEDURE — 92526 ORAL FUNCTION THERAPY: CPT

## 2024-09-06 PROCEDURE — 99232 SBSQ HOSP IP/OBS MODERATE 35: CPT | Performed by: STUDENT IN AN ORGANIZED HEALTH CARE EDUCATION/TRAINING PROGRAM

## 2024-09-06 RX ADMIN — SENNOSIDES AND DOCUSATE SODIUM 2 TABLET: 8.6; 5 TABLET ORAL at 21:02

## 2024-09-06 RX ADMIN — FLUOXETINE 120 MG: 20 SOLUTION ORAL at 09:07

## 2024-09-06 RX ADMIN — FAMOTIDINE 20 MG: 20 TABLET ORAL at 08:55

## 2024-09-06 RX ADMIN — Medication 15 ML: at 08:57

## 2024-09-06 RX ADMIN — FAMOTIDINE 20 MG: 20 TABLET ORAL at 17:04

## 2024-09-06 RX ADMIN — CLOZAPINE 550 MG: 100 TABLET ORAL at 21:02

## 2024-09-06 RX ADMIN — CARVEDILOL 6.25 MG: 6.25 TABLET, FILM COATED ORAL at 08:55

## 2024-09-06 RX ADMIN — NICOTINE 1 PATCH: 7 PATCH, EXTENDED RELEASE TRANSDERMAL at 09:00

## 2024-09-06 RX ADMIN — TRAZODONE HYDROCHLORIDE 50 MG: 50 TABLET ORAL at 21:02

## 2024-09-06 RX ADMIN — MELATONIN TAB 3 MG 3 MG: 3 TAB at 21:02

## 2024-09-06 RX ADMIN — ATORVASTATIN CALCIUM 10 MG: 10 TABLET, FILM COATED ORAL at 08:55

## 2024-09-06 RX ADMIN — LORAZEPAM 0.5 MG: 0.5 TABLET ORAL at 08:55

## 2024-09-06 RX ADMIN — FLUTICASONE FUROATE 1 PUFF: 100 POWDER RESPIRATORY (INHALATION) at 09:06

## 2024-09-06 RX ADMIN — CYANOCOBALAMIN TAB 1000 MCG 1000 MCG: 1000 TAB at 08:55

## 2024-09-06 RX ADMIN — LORAZEPAM 0.5 MG: 0.5 TABLET ORAL at 17:04

## 2024-09-06 RX ADMIN — POLYETHYLENE GLYCOL 3350 17 G: 17 POWDER, FOR SOLUTION ORAL at 08:57

## 2024-09-06 RX ADMIN — CARVEDILOL 6.25 MG: 6.25 TABLET, FILM COATED ORAL at 17:04

## 2024-09-06 NOTE — TREATMENT TEAM
Pt attended 1 afternoon group.  Pt more visible and social with peers.  Pt able to provide thoughts and interact.  Restless after a period of time and short attention span.      09/06/24 1330   Activity/Group Checklist   Group Other (Comment)  (Creative expression and sound)   Attendance Attended;Other (Comment)  (left early)   Attendance Duration (min) 46-60   Interactions Other (Comment)  (restless, in hospital gown)   Affect/Mood Wide   Goals Achieved Identified feelings;Identified triggers;Discussed coping strategies;Discussed self-esteem issues;Able to listen to others;Able to engage in interactions;Able to self-disclose;Able to recieve feedback;Able to manage/cope with feelings;Able to reflect/comment on own behavior;Verbalized increased hopefulness

## 2024-09-06 NOTE — SPEECH THERAPY NOTE
Speech Language/Pathology    Speech/Language Pathology Progress Note    Patient Name: Meli Nowak  Today's Date: 2024                     SLP RECOMMENDATIONS:         Diet: regular consistency         Liquids: Thin liquids         Medications: as best tolerated         Aspiration Precautions: upright, small bites/sips, alternate solids        Summary:  Pt seen for f/u. Pt observed with portion of breakfast meal (regular/thin) with slow, but functional mastication. Bite-strength adequate. Pt reports liking advanced diet. Pt denies any difficulty chewing/swallowing. SLP reviewed general swallow precautions with pt. Pt appears to be doing well with current diet. Pt has met all ST goals at this time. Will d/c from services. Recommend continuing current diet. Please re-consult with any new concerns.     Assessment:  Functional mastication with regular solids, no overt s/s aspiration with thin liquids     Plan/Recommendations:  Regular consistency/thin liquids  D/c from ST services        Lab Results   Component Value Date    WBC 7.50 2024    HGB 14.0 2024    HCT 43.4 2024    MCV 94 2024     2024           Problem List  Principal Problem:    Schizoaffective disorder, bipolar type (HCC)  Active Problems:    Medical clearance for psychiatric admission    Type 2 diabetes mellitus (HCC)    Obesity    Tobacco abuse    Hyperlipidemia    Esophageal reflux    Essential (primary) hypertension    Vitamin B12 deficiency    Hypoxia    Ambulatory dysfunction    Mild protein-calorie malnutrition (HCC)       Past Medical History  Past Medical History:   Diagnosis Date    Cognitive impairment     Diabetes mellitus (HCC)     Essential (primary) hypertension     Psychosis (HCC)         Past Surgical History  Past Surgical History:   Procedure Laterality Date     SECTION      CHOLECYSTECTOMY
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
## 2024-09-06 NOTE — NURSING NOTE
Patient is visible in the dayroom. She is social with select peers and is initiating conversations appropriately. Patient endorses auditory hallucinations telling her not to eat or take her medications but she reports that she is able to ignore them today. Patient endorses anxiety at this time. She is medication compliant. Encouraged to inform staff of any needs or concerns.

## 2024-09-06 NOTE — NURSING NOTE
"Patient with poor sleep. Pacing the halls. \"I am so awake.\" Patient calm and pleasant. Will monitor.  "

## 2024-09-06 NOTE — PLAN OF CARE
Problem: Prexisting or High Potential for Compromised Skin Integrity  Goal: Skin integrity is maintained or improved  Description: INTERVENTIONS:  - Identify patients at risk for skin breakdown  - Assess and monitor skin integrity  - Assess and monitor nutrition and hydration status  - Monitor labs   - Assess for incontinence   - Turn and reposition patient  - Assist with mobility/ambulation  - Relieve pressure over bony prominences  - Avoid friction and shearing  - Provide appropriate hygiene as needed including keeping skin clean and dry  - Evaluate need for skin moisturizer/barrier cream  - Collaborate with interdisciplinary team   - Patient/family teaching  - Consider wound care consult   Outcome: Progressing     Problem: Ineffective Coping  Goal: Participates in unit activities  Description: Interventions:  - Provide therapeutic environment   - Provide required programming   - Redirect inappropriate behaviors   Outcome: Progressing  Goal: Free from restraint events  Description: - Utilize least restrictive measures   - Provide behavioral interventions   - Redirect inappropriate behaviors   Outcome: Progressing     Problem: Depression  Goal: Treatment Goal: Demonstrate behavioral control of depressive symptoms, verbalize feelings of improved mood/affect, and adopt new coping skills prior to discharge  Outcome: Progressing  Goal: Refrain from isolation  Description: Interventions:  - Develop a trusting relationship   - Encourage socialization   Outcome: Progressing     Problem: Anxiety  Goal: Anxiety is at manageable level  Description: Interventions:  - Assess and monitor patient's anxiety level.   - Monitor for signs and symptoms (heart palpitations, chest pain, shortness of breath, headaches, nausea, feeling jumpy, restlessness, irritable, apprehensive).   - Collaborate with interdisciplinary team and initiate plan and interventions as ordered.  - [place] patient to unit/surroundings  - Explain treatment  plan  - Encourage participation in care  - Encourage verbalization of concerns/fears  - Identify coping mechanisms  - Assist in developing anxiety-reducing skills  - Administer/offer alternative therapies  - Limit or eliminate stimulants  Outcome: Progressing     Problem: Potential for Falls  Goal: Patient will remain free of falls  Description: INTERVENTIONS:  - Educate patient on patient safety including physical limitations  - Instruct patient to call for assistance with activity   - Consult OT/PT to assist with strengthening/mobility   - Keep Call bell within reach  - Keep bed low and locked with side rails adjusted as appropriate  - Keep care items and personal belongings within reach  - Initiate and maintain comfort rounds  - Offer Toileting every 2 Hours, in advance of need  - Initiate/Maintain bed and chair alarm  - Obtain necessary fall risk management equipment: walker, wheelchair  - Apply yellow socks and bracelet for high fall risk patients  - Patient moved to room near nurses station  Outcome: Progressing     Problem: Nutrition/Hydration-ADULT  Goal: Nutrient/Hydration intake appropriate for improving, restoring or maintaining nutritional needs  Description: Monitor and assess patient's nutrition/hydration status for malnutrition. Collaborate with interdisciplinary team and initiate plan and interventions as ordered.  Monitor patient's weight and dietary intake as ordered or per policy. Utilize nutrition screening tool and intervene as necessary. Determine patient's food preferences and provide high-protein, high-caloric foods as appropriate.     INTERVENTIONS:  - Monitor oral intake, urinary output, labs, and treatment plans  - Assess nutrition and hydration status and recommend course of action  - Evaluate amount of meals eaten  - Assist patient with eating if necessary   - Allow adequate time for meals  - Recommend/ encourage appropriate diets, oral nutritional supplements, and vitamin/mineral  supplements  - Order, calculate, and assess calorie counts as needed  - Recommend, monitor, and adjust tube feedings and TPN/PPN based on assessed needs  - Assess need for intravenous fluids  - Provide specific nutrition/hydration education as appropriate  - Include patient/family/caregiver in decisions related to nutrition  Outcome: Progressing

## 2024-09-06 NOTE — NURSING NOTE
"Patient is awake and had requested water. When given to her she asked if it was drugged. Reassured patient that no one is putting anything in her food or drinks. Patient declined need for prn for sleep. Patient then came to nurse's station and reported \"I am in trouble.\" When asked why she reported that her toilet overflowed due to too much paper. Patient reassured that she was not in trouble and toilet was fixed. Patient needs emotional support often. Will continue to monitor and support during treatment.  "

## 2024-09-06 NOTE — PROGRESS NOTES
Progress Note - Behavioral Health   Meli Nowak 59 y.o. female MRN: [id number]  Unit/Bed#: [facility] 651-01 Encounter: [id number]    Patient was seen today for continuation of care, records reviewed and patient was discussed with the morning case review team.    Meli was seen today for psychiatric follow-up.  On assessment today, Meli was pleasant and cooperative.  More visible on the unit, she is noted to be more social with select peers.  Auditory hallucinations as well as increased paranoia noted overnight, patient increasingly anxious and reluctant to continue with medication regimen.  Compliant with consistent redirection and education.  We will increase Clozaril to 550 mg nightly to assist with symptoms.  Weekly labs to be maintained and obtained q. Monday, Clozaril level last obtained at 668.  Tentative discharge ongoing as patient will benefit from referral to [facility].    Meli denies acute suicidal/self-harm ideation/intent/plan upon direct inquiry at this time.  Meli remains behaviorally appropriate, no agitation or aggression noted on exam or in report.  Meli also denies HI/AH/VH, and does not appear overtly manic.   Impulse control is intact.  Meli remains adherent to her current psychotropic medication regimen and denies any side effects from medications, as well as none noted on exam.    Vitals:  Vitals:    09/06/24 0756   BP: 116/58   Pulse: 89   Resp: 18   Temp: 97.6 °F (36.4 °C)   SpO2: 95%       Laboratory Results:  I have personally reviewed all pertinent laboratory/tests results.    Psychiatric Review of Systems:  Behavior over the last 24 hours:  unchanged.   Sleep: good  Appetite: good  Medication side effects:none   ROS: no complaints, denies shortness of breath or chest pain and all other systems are negative for acute changes    Mental Status Evaluation:  Appearance:  dressed appropriately, adequate grooming   Behavior:  cooperative, calm, bizarre, guarded   Speech:  normal rate and volume   Mood:   anxious, less depressed   Affect:  constricted   Thought Process:  concrete, more logical, less disorganized   Thought Content:  some paranoia   Perceptual Disturbances: auditory hallucinations still present, but less frequent   Risk Potential: Suicidal ideation - None  Homicidal ideation - None  Potential for aggression - No   Memory:  recent and remote memory grossly intact   Sensorium  person, place, and time/date      Consciousness:  alert and awake   Attention: attention span and concentration are age appropriate   Insight:  limited   Judgment: limited   Gait/Station: normal gait/station   Motor Activity: no abnormal movements   Progress Toward Goals:   Meli is progressing towards goals of inpatient psychiatric treatment by continued medication compliance and is attending therapeutic modalities on the milieu. However, the patient continues to require inpatient psychiatric hospitalization for continued medication management and titration to optimize symptom reduction, improve sleep hygiene, and demonstrate adequate self-care.    Assessment & Plan   Principal Problem:    Schizoaffective disorder, bipolar type (HCC)  Active Problems:    Medical clearance for psychiatric admission    Type 2 diabetes mellitus (HCC)    Obesity    Tobacco abuse    Hyperlipidemia    Esophageal reflux    Essential (primary) hypertension    Vitamin B12 deficiency    Hypoxia    Ambulatory dysfunction    Mild protein-calorie malnutrition (HCC)      Recommended Treatment: Treatment plan and medication changes discussed and per the attending physician the plan is:    1.Continue with group therapy, milieu therapy and occupational therapy  2.Behavioral Health checks every 7 minutes  3.Continue frequent safety checks and vitals per unit protocol  4.Continue with SLIM medical management as indicated  5.Continue with current medication regimen  6.Will review labs in the a.m.  7.Disposition Planning: Discharge planning and efforts remain  ongoing    Behavioral Health Medications: all current active meds have been reviewed and continue current psychiatric medications.  Current Facility-Administered Medications   Medication Dose Route Frequency Provider Last Rate    acetaminophen  650 mg Oral Q4H PRN Marie Ziegler, CRNP      acetaminophen  650 mg Oral Q4H PRN Marie Ziegler, CRNP      acetaminophen  975 mg Oral Q6H PRN Marie Ziegler, CRNP      aluminum-magnesium hydroxide-simethicone  30 mL Oral Q4H PRN Parris Bolanos, JOSE ELIAS      Artificial Tears  1 drop Both Eyes Q6H PRN Dereje Banuelos MD      atorvastatin  10 mg Oral Daily Marie Ziegler, CRNP      bisacodyl  10 mg Rectal Daily PRN Marie Ziegler, CRNP      carvedilol  6.25 mg Oral BID With Meals Marie Ziegler, CHRISTOPHERNP      cloZAPine  550 mg Oral HS Marie Ziegler, CRNP      cyanocobalamin  1,000 mcg Oral Daily Marie Ziegler, CRNP      famotidine  20 mg Oral BID Shan Fitzgerald DO      FLUoxetine  120 mg Oral Daily Marie Ziegler, CRVALE      fluticasone  1 puff Inhalation Daily Marie Ziegler, JOSE ELIAS      hydrOXYzine HCL  25 mg Oral Q6H PRN Max 4/day Marie Ziegler, CRNP      hydrOXYzine HCL  50 mg Oral Q6H PRN Max 4/day Marie Ziegler, CRNP      ipratropium-albuterol  3 mL Nebulization Q4H PRN Darin Lawton MD      LORazepam  0.5 mg Oral BID Marie Ziegler, CHRISTOPHERNP      Or    LORazepam  0.5 mg Intramuscular BID Marie Ziegler, CHRISTOPHERNP      LORazepam  1 mg Intramuscular Q6H PRN Max 3/day Marie Ziegler, JOSE ELIAS      LORazepam  1 mg Oral Q6H PRN Max 3/day Marie Ziegler, CHRISTOPHERNP      melatonin  3 mg Oral HS Marie Ziegler, CRNP      Multivitamin  15 mL Oral Daily Marie Ziegler, CRNP      nicotine  1 patch Transdermal Daily Marie Ziegler, JOSE ELIAS      OLANZapine  2.5 mg Oral Q6H PRN Dereje Banuelos MD      Or    OLANZapine  2.5 mg Intramuscular Q6H PRN Dereje Banuelos MD      OLANZapine  5 mg Oral Q6H PRN Dereje Banuelos MD      Or    OLANZapine  5 mg Intramuscular Q6H PRN Dereje Banuelos MD       ondansetron  4 mg Oral Q6H PRN Darin Lawton MD      polyethylene glycol  17 g Oral Daily PRN JOSE ELIAS Figueroa      polyethylene glycol  17 g Oral Daily JOSE ELIAS Ortega      senna-docusate sodium  2 tablet Oral HS Rehana Borrego PA-C      traZODone  50 mg Oral HS JOSE ELIAS Figueroa         Risks / Benefits of Treatment:  Risks, benefits, and possible side effects of medications explained to patient and patient verbalizes understanding and agreement for treatment.    Counseling / Coordination of Care:  Patient's progress reviewed with nursing staff.  Medications, treatment progress and treatment plan reviewed with patient.  Supportive counseling provided to the patient.    Total floor/unit time spent today 25 minutes. Greater than 50% of total time was spent with the patient and / or family counseling and / or coordination of care. A description of the counseling / coordination of care: medication education, treatment plan, supportive therapy.    This note was completed in part utilizing Dragon dictation Software. Grammatical, translation, syntax errors, random word insertions, spelling mistakes, and incomplete sentences may be an occasional consequence of this system secondary to software limitations with voice recognition, ambient noise, and hardware issues. If you have any questions or concerns about the content, text, or information contained within the body of this dictation, please contact the provider for clarification

## 2024-09-07 PROCEDURE — 99232 SBSQ HOSP IP/OBS MODERATE 35: CPT | Performed by: PHYSICIAN ASSISTANT

## 2024-09-07 RX ADMIN — MELATONIN TAB 3 MG 3 MG: 3 TAB at 21:02

## 2024-09-07 RX ADMIN — FLUOXETINE 120 MG: 20 SOLUTION ORAL at 08:16

## 2024-09-07 RX ADMIN — LORAZEPAM 0.5 MG: 0.5 TABLET ORAL at 08:15

## 2024-09-07 RX ADMIN — LORAZEPAM 0.5 MG: 0.5 TABLET ORAL at 17:02

## 2024-09-07 RX ADMIN — DEXTRAN 70, GLYCERIN, HYPROMELLOSE 1 DROP: 1; 2; 3 SOLUTION/ DROPS OPHTHALMIC at 19:13

## 2024-09-07 RX ADMIN — CARVEDILOL 6.25 MG: 6.25 TABLET, FILM COATED ORAL at 16:51

## 2024-09-07 RX ADMIN — POLYETHYLENE GLYCOL 3350 17 G: 17 POWDER, FOR SOLUTION ORAL at 08:15

## 2024-09-07 RX ADMIN — CYANOCOBALAMIN TAB 1000 MCG 1000 MCG: 1000 TAB at 08:15

## 2024-09-07 RX ADMIN — TRAZODONE HYDROCHLORIDE 50 MG: 50 TABLET ORAL at 21:02

## 2024-09-07 RX ADMIN — FAMOTIDINE 20 MG: 20 TABLET ORAL at 08:15

## 2024-09-07 RX ADMIN — FAMOTIDINE 20 MG: 20 TABLET ORAL at 17:02

## 2024-09-07 RX ADMIN — FLUTICASONE FUROATE 1 PUFF: 100 POWDER RESPIRATORY (INHALATION) at 08:20

## 2024-09-07 RX ADMIN — Medication 15 ML: at 08:15

## 2024-09-07 RX ADMIN — SENNOSIDES AND DOCUSATE SODIUM 2 TABLET: 8.6; 5 TABLET ORAL at 21:02

## 2024-09-07 RX ADMIN — CARVEDILOL 6.25 MG: 6.25 TABLET, FILM COATED ORAL at 08:15

## 2024-09-07 RX ADMIN — ATORVASTATIN CALCIUM 10 MG: 10 TABLET, FILM COATED ORAL at 08:15

## 2024-09-07 RX ADMIN — CLOZAPINE 550 MG: 100 TABLET ORAL at 21:02

## 2024-09-07 NOTE — PLAN OF CARE
Problem: DISCHARGE PLANNING - CARE MANAGEMENT  Goal: Discharge to post-acute care or home with appropriate resources  Description: INTERVENTIONS:  - Conduct assessment to determine patient/family and health care team treatment goals, and need for post-acute services based on payer coverage, community resources, and patient preferences, and barriers to discharge  - Address psychosocial, clinical, and financial barriers to discharge as identified in assessment in conjunction with the patient/family and health care team  - Arrange appropriate level of post-acute services according to patient’s   needs and preference and payer coverage in collaboration with the physician and health care team  - Communicate with and update the patient/family, physician, and health care team regarding progress on the discharge plan  - Arrange appropriate transportation to post-acute venues  Outcome: Progressing     Problem: Prexisting or High Potential for Compromised Skin Integrity  Goal: Skin integrity is maintained or improved  Description: INTERVENTIONS:  - Identify patients at risk for skin breakdown  - Assess and monitor skin integrity  - Assess and monitor nutrition and hydration status  - Monitor labs   - Assess for incontinence   - Turn and reposition patient  - Assist with mobility/ambulation  - Relieve pressure over bony prominences  - Avoid friction and shearing  - Provide appropriate hygiene as needed including keeping skin clean and dry  - Evaluate need for skin moisturizer/barrier cream  - Collaborate with interdisciplinary team   - Patient/family teaching  - Consider wound care consult   Outcome: Progressing     Problem: Alteration in Thoughts and Perception  Goal: Treatment Goal: Gain control of psychotic behaviors/thinking, reduce/eliminate presenting symptoms and demonstrate improved reality functioning upon discharge  Outcome: Progressing  Goal: Verbalize thoughts and feelings  Description: Interventions:  - Promote  a nonjudgmental and trusting relationship with the patient through active listening and therapeutic communication  - Assess patient's level of functioning, behavior and potential for risk  - Engage patient in 1 on 1 interactions  - Encourage patient to express fears, feelings, frustrations, and discuss symptoms    - [place] patient to reality, help patient recognize reality-based thinking   - Administer medications as ordered and assess for potential side effects  - Provide the patient education related to the signs and symptoms of the illness and desired effects of prescribed medications  Outcome: Progressing  Goal: Refrain from acting on delusional thinking/internal stimuli  Description: Interventions:  - Monitor patient closely, per order   - Utilize least restrictive measures   - Set reasonable limits, give positive feedback for acceptable   - Administer medications as ordered and monitor of potential side effects  Outcome: Progressing  Goal: Agree to be compliant with medication regime, as prescribed and report medication side effects  Description: Interventions:  - Offer appropriate PRN medication and supervise ingestion; conduct AIMS, as needed   Outcome: Progressing  Goal: Recognize dysfunctional thoughts, communicate reality-based thoughts at the time of discharge  Description: Interventions:  - Provide medication and psycho-education to assist patient in compliance and developing insight into his/her illness   Outcome: Progressing  Goal: Complete daily ADLs, including personal hygiene independently, as able  Description: Interventions:  - Observe, teach, and assist patient with ADLS  - Monitor and promote a balance of rest/activity, with adequate nutrition and elimination   Outcome: Progressing     Problem: Ineffective Coping  Goal: Identifies ineffective coping skills  Outcome: Progressing  Goal: Demonstrates healthy coping skills  Outcome: Progressing  Goal: Patient/Family participate in treatment and DC  plans  Description: Interventions:  - Provide therapeutic environment  Outcome: Progressing  Goal: Patient/Family verbalizes awareness of resources  Outcome: Progressing  Goal: Understands least restrictive measures  Description: Interventions:  - Utilize least restrictive behavior  Outcome: Progressing  Goal: Free from restraint events  Description: - Utilize least restrictive measures   - Provide behavioral interventions   - Redirect inappropriate behaviors   Outcome: Progressing     Problem: Risk for Self Injury/Neglect  Goal: Treatment Goal: Remain safe during length of stay, learn and adopt new coping skills, and be free of self-injurious ideation, impulses and acts at the time of discharge  Outcome: Progressing  Goal: Verbalize thoughts and feelings  Description: Interventions:  - Assess and re-assess patient's lethality and potential for self-injury  - Engage patient in 1:1 interactions, daily, for a minimum of 15 minutes  - Encourage patient to express feelings, fears, frustrations, hopes  - Establish rapport/trust with patient   Outcome: Progressing  Goal: Refrain from harming self  Description: Interventions:  - Monitor patient closely, per order  - Develop a trusting relationship  - Supervise medication ingestion, monitor effects and side effects   Outcome: Progressing  Goal: Recognize maladaptive responses and adopt new coping mechanisms  Outcome: Progressing     Problem: Depression  Goal: Treatment Goal: Demonstrate behavioral control of depressive symptoms, verbalize feelings of improved mood/affect, and adopt new coping skills prior to discharge  Outcome: Progressing  Goal: Verbalize thoughts and feelings  Description: Interventions:  - Assess and re-assess patient's level of risk   - Engage patient in 1:1 interactions, daily, for a minimum of 15 minutes   - Encourage patient to express feelings, fears, frustrations, hopes   Outcome: Progressing  Goal: Refrain from isolation  Description:  Interventions:  - Develop a trusting relationship   - Encourage socialization   Outcome: Progressing  Goal: Refrain from self-neglect  Outcome: Progressing     Problem: Anxiety  Goal: Anxiety is at manageable level  Description: Interventions:  - Assess and monitor patient's anxiety level.   - Monitor for signs and symptoms (heart palpitations, chest pain, shortness of breath, headaches, nausea, feeling jumpy, restlessness, irritable, apprehensive).   - Collaborate with interdisciplinary team and initiate plan and interventions as ordered.  - [place] patient to unit/surroundings  - Explain treatment plan  - Encourage participation in care  - Encourage verbalization of concerns/fears  - Identify coping mechanisms  - Assist in developing anxiety-reducing skills  - Administer/offer alternative therapies  - Limit or eliminate stimulants  Outcome: Progressing     Problem: SAFETY,RESTRAINT: NV/NON-SELF DESTRUCTIVE BEHAVIOR  Goal: Remains free of harm/injury (restraint for non violent/non self-detsructive behavior)  Description: INTERVENTIONS:  - Instruct patient/family regarding restraint use   - Assess and monitor physiologic and psychological status   - Provide interventions and comfort measures to meet assessed patient needs   - Identify and implement measures to help patient regain control  - Assess readiness for release of restraint   Outcome: Progressing  Goal: Returns to optimal restraint-free functioning  Description: INTERVENTIONS:  - Assess the patient's behavior and symptoms that indicate continued need for restraint  - Identify and implement measures to help patient regain control  - Assess readiness for release of restraint   Outcome: Progressing     Problem: Potential for Falls  Goal: Patient will remain free of falls  Description: INTERVENTIONS:  - Educate patient on patient safety including physical limitations  - Instruct patient to call for assistance with activity   - Consult OT/PT to assist with  strengthening/mobility   - Keep Call bell within reach  - Keep bed low and locked with side rails adjusted as appropriate  - Keep care items and personal belongings within reach  - Initiate and maintain comfort rounds  - Offer Toileting every 2 Hours, in advance of need  - Initiate/Maintain bed and chair alarm  - Obtain necessary fall risk management equipment: walker, wheelchair  - Apply yellow socks and bracelet for high fall risk patients  - Patient moved to room near nurses station  Outcome: Progressing     Problem: Nutrition/Hydration-ADULT  Goal: Nutrient/Hydration intake appropriate for improving, restoring or maintaining nutritional needs  Description: Monitor and assess patient's nutrition/hydration status for malnutrition. Collaborate with interdisciplinary team and initiate plan and interventions as ordered.  Monitor patient's weight and dietary intake as ordered or per policy. Utilize nutrition screening tool and intervene as necessary. Determine patient's food preferences and provide high-protein, high-caloric foods as appropriate.     INTERVENTIONS:  - Monitor oral intake, urinary output, labs, and treatment plans  - Assess nutrition and hydration status and recommend course of action  - Evaluate amount of meals eaten  - Assist patient with eating if necessary   - Allow adequate time for meals  - Recommend/ encourage appropriate diets, oral nutritional supplements, and vitamin/mineral supplements  - Order, calculate, and assess calorie counts as needed  - Recommend, monitor, and adjust tube feedings and TPN/PPN based on assessed needs  - Assess need for intravenous fluids  - Provide specific nutrition/hydration education as appropriate  - Include patient/family/caregiver in decisions related to nutrition  Outcome: Progressing

## 2024-09-07 NOTE — NURSING NOTE
"Patient returned to her room after dinner reporting that she was upset. Patient stated \"I thought I had a boyfriend here but I don't think he likes me anymore.\" Meli has been getting noticeably close to a male peer on the unit. Patient provided support and encouraged to focus on bettering herself before developing a relationship.    "

## 2024-09-07 NOTE — NURSING NOTE
Patient is visible on the unit and social with peers. She is less paranoid but continues to endorse auditory hallucinations telling her not to eat or take her medications that she is able to ignore. Patient also endorses anxiety. She is medication compliant. Encouraged to inform staff of any needs or concerns.

## 2024-09-07 NOTE — PROGRESS NOTES
"Progress Note - Behavioral Health   Meli Nowak 59 y.o. female MRN: [id number]  Unit/Bed#: [facility] 651-01 Encounter: [id number]    Meli was seen for follow-up, chart reviewed and discussed with nursing staff.  Nursing staff notes she continues with residual auditory hallucinations of her  father but states that she is able to ignore these more.  Auditory hallucinations have been telling her to not eat and not take her medications.  She has an improvement in her appetite.  Has been sleeping adequately.  Reluctant to take medications but has been compliant.  No physical aggression or agitation.    Meli was seen seated in day room.  She appears bizarre but is calm on approach.  \"What's wrong with your eyes?  You are looking at me\".  preoccupied and fixated with make up today and ordering make up from staff.  States that she wants red lipstick and blush.  Poor insight into hospitalization.  Denies suicidal or homicidal ideation.  Denies auditory or visual hallucinations although she appears internally preoccupied.  Physically she denies any pain or discomfort.    Behavior over the last 24 hours:  unchanged  Sleep: normal  Appetite: normal  Medication side effects: No  ROS: no complaints  /83   Pulse 98   Temp 98.2 °F (36.8 °C) (Temporal)   Resp 17   Ht 5' 7\" (1.702 m)   Wt 94.6 kg (208 lb 8.9 oz)   SpO2 97%   BMI 32.66 kg/m²     Medications:   Current Facility-Administered Medications   Medication Dose Route Frequency    acetaminophen (TYLENOL) tablet 650 mg  650 mg Oral Q4H PRN    acetaminophen (TYLENOL) tablet 650 mg  650 mg Oral Q4H PRN    acetaminophen (TYLENOL) tablet 975 mg  975 mg Oral Q6H PRN    aluminum-magnesium hydroxide-simethicone (MAALOX) oral suspension 30 mL  30 mL Oral Q4H PRN    Artificial Tears Op Soln 1 drop  1 drop Both Eyes Q6H PRN    atorvastatin (LIPITOR) tablet 10 mg  10 mg Oral Daily    bisacodyl (DULCOLAX) rectal suppository 10 mg  10 mg Rectal Daily PRN    carvedilol " (COREG) tablet 6.25 mg  6.25 mg Oral BID With Meals    cloZAPine (CLOZARIL) tablet 550 mg  550 mg Oral HS    cyanocobalamin (VITAMIN B-12) tablet 1,000 mcg  1,000 mcg Oral Daily    famotidine (PEPCID) tablet 20 mg  20 mg Oral BID    FLUoxetine (PROzac) oral solution 120 mg  120 mg Oral Daily    fluticasone (ARNUITY ELLIPTA) 100 MCG/ACT inhaler 1 puff  1 puff Inhalation Daily    hydrOXYzine HCL (ATARAX) tablet 25 mg  25 mg Oral Q6H PRN Max 4/day    hydrOXYzine HCL (ATARAX) tablet 50 mg  50 mg Oral Q6H PRN Max 4/day    ipratropium-albuterol (DUO-NEB) 0.5-2.5 mg/3 mL inhalation solution 3 mL  3 mL Nebulization Q4H PRN    LORazepam (ATIVAN) tablet 0.5 mg  0.5 mg Oral BID    Or    LORazepam (ATIVAN) injection 0.5 mg  0.5 mg Intramuscular BID    LORazepam (ATIVAN) injection 1 mg  1 mg Intramuscular Q6H PRN Max 3/day    LORazepam (ATIVAN) tablet 1 mg  1 mg Oral Q6H PRN Max 3/day    melatonin tablet 3 mg  3 mg Oral HS    Multivitamin liquid 15 mL  15 mL Oral Daily    nicotine (NICODERM CQ) 7 mg/24hr TD 24 hr patch 1 patch  1 patch Transdermal Daily    OLANZapine (ZyPREXA) tablet 2.5 mg  2.5 mg Oral Q6H PRN    Or    OLANZapine (ZyPREXA) IM injection 2.5 mg  2.5 mg Intramuscular Q6H PRN    OLANZapine (ZyPREXA) tablet 5 mg  5 mg Oral Q6H PRN    Or    OLANZapine (ZyPREXA) IM injection 5 mg  5 mg Intramuscular Q6H PRN    ondansetron (ZOFRAN-ODT) dispersible tablet 4 mg  4 mg Oral Q6H PRN    polyethylene glycol (MIRALAX) packet 17 g  17 g Oral Daily PRN    polyethylene glycol (MIRALAX) packet 17 g  17 g Oral Daily    senna-docusate sodium (SENOKOT S) 8.6-50 mg per tablet 2 tablet  2 tablet Oral HS    traZODone (DESYREL) tablet 50 mg  50 mg Oral HS       Labs:   No results displayed because visit has over 200 results.          Mental Status Evaluation:  Appearance:  age appropriate and borderline hygiene   Behavior:  guarded and bizarre   Speech:  normal pitch and normal volume   Mood:  labile   Affect:  labile   Thought Process:   disorganized and perserverative   Thought Content:     Associations: delusions  persecutory    [place]   Perceptual Disturbances: Appears internally preoccupied   Risk Potential: Suicidal Ideations none, Homicidal Ideations none, and Potential for Aggression No   Sensorium:  person and place   Cognition:  recent and remote memory grossly intact   Consciousness:  alert and awake    Attention: attention span appeared shorter than expected for age   Insight:  limited   Judgment: limited   Gait/Station: normal gait/station   Motor Activity: no abnormal movements     Progress Toward Goals: Continues to require inpatient treatment for medication optimization, placement to [facility] and discharge planning is ongoing    Assessment & Plan   Principal Problem:    Schizoaffective disorder, bipolar type (HCC)  Active Problems:    Medical clearance for psychiatric admission    Type 2 diabetes mellitus (HCC)    Obesity    Tobacco abuse    Hyperlipidemia    Esophageal reflux    Essential (primary) hypertension    Vitamin B12 deficiency    Hypoxia    Ambulatory dysfunction    Mild protein-calorie malnutrition (HCC)      Recommended Treatment:   Clozaril 550 mg nightly, increased last night  Fluoxetine 120 mg daily  Lorazepam 0.5 mg twice daily  Melatonin 3 mg nightly  Trazodone 50 mg nightly    Continue with group therapy, milieu therapy and occupational therapy.      Risks, benefits and possible side effects of Medications:   Risks, benefits, and possible side effects of medications explained to patient and patient verbalizes understanding.        Counseling / Coordination of Care  Total floor / unit time spent today 25 minutes. Greater than 50% of total time was spent with the patient and / or family counseling and / or coordination of care. A description of the counseling / coordination of care: Medication management, chart review, patient interview

## 2024-09-08 PROCEDURE — 99232 SBSQ HOSP IP/OBS MODERATE 35: CPT | Performed by: PHYSICIAN ASSISTANT

## 2024-09-08 RX ADMIN — FLUTICASONE FUROATE 1 PUFF: 100 POWDER RESPIRATORY (INHALATION) at 08:07

## 2024-09-08 RX ADMIN — CARVEDILOL 6.25 MG: 6.25 TABLET, FILM COATED ORAL at 08:06

## 2024-09-08 RX ADMIN — ATORVASTATIN CALCIUM 10 MG: 10 TABLET, FILM COATED ORAL at 08:06

## 2024-09-08 RX ADMIN — NICOTINE 1 PATCH: 7 PATCH, EXTENDED RELEASE TRANSDERMAL at 08:07

## 2024-09-08 RX ADMIN — CYANOCOBALAMIN TAB 1000 MCG 1000 MCG: 1000 TAB at 08:06

## 2024-09-08 RX ADMIN — CLOZAPINE 550 MG: 100 TABLET ORAL at 21:07

## 2024-09-08 RX ADMIN — FAMOTIDINE 20 MG: 20 TABLET ORAL at 17:01

## 2024-09-08 RX ADMIN — CARVEDILOL 6.25 MG: 6.25 TABLET, FILM COATED ORAL at 17:01

## 2024-09-08 RX ADMIN — LORAZEPAM 0.5 MG: 0.5 TABLET ORAL at 08:06

## 2024-09-08 RX ADMIN — MELATONIN TAB 3 MG 3 MG: 3 TAB at 21:08

## 2024-09-08 RX ADMIN — FLUOXETINE 120 MG: 20 SOLUTION ORAL at 08:07

## 2024-09-08 RX ADMIN — FAMOTIDINE 20 MG: 20 TABLET ORAL at 08:06

## 2024-09-08 RX ADMIN — SENNOSIDES AND DOCUSATE SODIUM 2 TABLET: 8.6; 5 TABLET ORAL at 21:07

## 2024-09-08 RX ADMIN — LORAZEPAM 0.5 MG: 0.5 TABLET ORAL at 17:01

## 2024-09-08 RX ADMIN — TRAZODONE HYDROCHLORIDE 50 MG: 50 TABLET ORAL at 21:08

## 2024-09-08 RX ADMIN — Medication 15 ML: at 08:07

## 2024-09-08 RX ADMIN — POLYETHYLENE GLYCOL 3350 17 G: 17 POWDER, FOR SOLUTION ORAL at 08:07

## 2024-09-08 NOTE — PROGRESS NOTES
"Progress Note - Behavioral Health   Meli Nowak 59 y.o. female MRN: [id number]  Unit/Bed#: [facility] 651-01 Encounter: [id number]    Meli was seen for follow-up, chart reviewed and discussed with nursing staff.  Nursing staff notes that she continues with auditory hallucinations stating the voices are telling her not to take her medications.  She has been compliant though with medications and with meals.  Sleeping and eating adequately.  Visible in the milieu and attending groups.  No physical aggression or agitation.    She is calm and cooperative on approach.  Continues to be fixated on make up today and repeatedly asking me for lipstick and blush.  Poor insight into hospitalization.  She currently denies anxiety or depression.  Appears internally preoccupied but currently denies auditory or visual hallucinations.  Physically she denies any pain or discomfort.      Behavior over the last 24 hours:  unchanged  Sleep: normal  Appetite: normal  Medication side effects: No  ROS: no complaints  /73 (BP Location: Left arm)   Pulse 96   Temp 98.8 °F (37.1 °C) (Temporal)   Resp 17   Ht 5' 7\" (1.702 m)   Wt 96.7 kg (213 lb 4 oz)   SpO2 92%   BMI 33.40 kg/m²     Medications:   Current Facility-Administered Medications   Medication Dose Route Frequency    acetaminophen (TYLENOL) tablet 650 mg  650 mg Oral Q4H PRN    acetaminophen (TYLENOL) tablet 650 mg  650 mg Oral Q4H PRN    acetaminophen (TYLENOL) tablet 975 mg  975 mg Oral Q6H PRN    aluminum-magnesium hydroxide-simethicone (MAALOX) oral suspension 30 mL  30 mL Oral Q4H PRN    Artificial Tears Op Soln 1 drop  1 drop Both Eyes Q6H PRN    atorvastatin (LIPITOR) tablet 10 mg  10 mg Oral Daily    bisacodyl (DULCOLAX) rectal suppository 10 mg  10 mg Rectal Daily PRN    carvedilol (COREG) tablet 6.25 mg  6.25 mg Oral BID With Meals    cloZAPine (CLOZARIL) tablet 550 mg  550 mg Oral HS    cyanocobalamin (VITAMIN B-12) tablet 1,000 mcg  1,000 mcg Oral Daily    " famotidine (PEPCID) tablet 20 mg  20 mg Oral BID    FLUoxetine (PROzac) oral solution 120 mg  120 mg Oral Daily    fluticasone (ARNUITY ELLIPTA) 100 MCG/ACT inhaler 1 puff  1 puff Inhalation Daily    hydrOXYzine HCL (ATARAX) tablet 25 mg  25 mg Oral Q6H PRN Max 4/day    hydrOXYzine HCL (ATARAX) tablet 50 mg  50 mg Oral Q6H PRN Max 4/day    ipratropium-albuterol (DUO-NEB) 0.5-2.5 mg/3 mL inhalation solution 3 mL  3 mL Nebulization Q4H PRN    LORazepam (ATIVAN) tablet 0.5 mg  0.5 mg Oral BID    Or    LORazepam (ATIVAN) injection 0.5 mg  0.5 mg Intramuscular BID    LORazepam (ATIVAN) injection 1 mg  1 mg Intramuscular Q6H PRN Max 3/day    LORazepam (ATIVAN) tablet 1 mg  1 mg Oral Q6H PRN Max 3/day    melatonin tablet 3 mg  3 mg Oral HS    Multivitamin liquid 15 mL  15 mL Oral Daily    nicotine (NICODERM CQ) 7 mg/24hr TD 24 hr patch 1 patch  1 patch Transdermal Daily    OLANZapine (ZyPREXA) tablet 2.5 mg  2.5 mg Oral Q6H PRN    Or    OLANZapine (ZyPREXA) IM injection 2.5 mg  2.5 mg Intramuscular Q6H PRN    OLANZapine (ZyPREXA) tablet 5 mg  5 mg Oral Q6H PRN    Or    OLANZapine (ZyPREXA) IM injection 5 mg  5 mg Intramuscular Q6H PRN    ondansetron (ZOFRAN-ODT) dispersible tablet 4 mg  4 mg Oral Q6H PRN    polyethylene glycol (MIRALAX) packet 17 g  17 g Oral Daily PRN    polyethylene glycol (MIRALAX) packet 17 g  17 g Oral Daily    senna-docusate sodium (SENOKOT S) 8.6-50 mg per tablet 2 tablet  2 tablet Oral HS    traZODone (DESYREL) tablet 50 mg  50 mg Oral HS       Labs:   No results displayed because visit has over 200 results.          Mental Status Evaluation:  Appearance:  age appropriate and borderline hygiene   Behavior:  Bizarre   Speech:  normal pitch and normal volume   Mood:  labile   Affect:  constricted   Thought Process:  disorganized and perserverative   Thought Content:     Associations: delusions  bizarre    [place]   Perceptual Disturbances: Appears internally preoccupied   Risk Potential: Suicidal  Ideations none, Homicidal Ideations none, and Potential for Aggression No   Sensorium:  person and place   Cognition:  recent and remote memory grossly intact   Consciousness:  alert and awake    Attention: attention span appeared shorter than expected for age   Insight:  limited   Judgment: limited   Gait/Station: normal gait/station   Motor Activity: no abnormal movements     Progress Toward Goals: Continue to require inpatient treatment for medication optimization, discharge planning is ongoing/placement to [facility] pending    Assessment & Plan   Principal Problem:    Schizoaffective disorder, bipolar type (HCC)  Active Problems:    Medical clearance for psychiatric admission    Type 2 diabetes mellitus (HCC)    Obesity    Tobacco abuse    Hyperlipidemia    Esophageal reflux    Essential (primary) hypertension    Vitamin B12 deficiency    Hypoxia    Ambulatory dysfunction    Mild protein-calorie malnutrition (HCC)      Recommended Treatment:   Clozaril 550 mg nightly, increased last night  Fluoxetine 120 mg daily  Lorazepam 0.5 mg twice daily  Melatonin 3 mg nightly  Trazodone 50 mg nightly  Continue with group therapy, milieu therapy and occupational therapy.      Risks, benefits and possible side effects of Medications:   Risks, benefits, and possible side effects of medications explained to patient and patient verbalizes understanding.        Counseling / Coordination of Care  Total floor / unit time spent today 25 minutes. Greater than 50% of total time was spent with the patient and / or family counseling and / or coordination of care. A description of the counseling / coordination of care: Medication management, chart review, patient interview

## 2024-09-08 NOTE — PLAN OF CARE
Problem: Alteration in Thoughts and Perception  Goal: Verbalize thoughts and feelings  Description: Interventions:  - Promote a nonjudgmental and trusting relationship with the patient through active listening and therapeutic communication  - Assess patient's level of functioning, behavior and potential for risk  - Engage patient in 1 on 1 interactions  - Encourage patient to express fears, feelings, frustrations, and discuss symptoms    - [place] patient to reality, help patient recognize reality-based thinking   - Administer medications as ordered and assess for potential side effects  - Provide the patient education related to the signs and symptoms of the illness and desired effects of prescribed medications  Outcome: Progressing     Problem: Ineffective Coping  Goal: Participates in unit activities  Description: Interventions:  - Provide therapeutic environment   - Provide required programming   - Redirect inappropriate behaviors   Outcome: Progressing     Problem: Risk for Self Injury/Neglect  Goal: Treatment Goal: Remain safe during length of stay, learn and adopt new coping skills, and be free of self-injurious ideation, impulses and acts at the time of discharge  Outcome: Progressing  Goal: Verbalize thoughts and feelings  Description: Interventions:  - Assess and re-assess patient's lethality and potential for self-injury  - Engage patient in 1:1 interactions, daily, for a minimum of 15 minutes  - Encourage patient to express feelings, fears, frustrations, hopes  - Establish rapport/trust with patient   Outcome: Progressing  Goal: Refrain from harming self  Description: Interventions:  - Monitor patient closely, per order  - Develop a trusting relationship  - Supervise medication ingestion, monitor effects and side effects   Outcome: Progressing  Goal: Attend and participate in unit activities, including therapeutic, recreational, and educational groups  Description: Interventions:  - Provide therapeutic  and educational activities daily, encourage attendance and participation, and document same in the medical record  - Obtain collateral information, encourage visitation and family involvement in care   Outcome: Progressing  Goal: Recognize maladaptive responses and adopt new coping mechanisms  Outcome: Progressing     Problem: Anxiety  Goal: Anxiety is at manageable level  Description: Interventions:  - Assess and monitor patient's anxiety level.   - Monitor for signs and symptoms (heart palpitations, chest pain, shortness of breath, headaches, nausea, feeling jumpy, restlessness, irritable, apprehensive).   - Collaborate with interdisciplinary team and initiate plan and interventions as ordered.  - [place] patient to unit/surroundings  - Explain treatment plan  - Encourage participation in care  - Encourage verbalization of concerns/fears  - Identify coping mechanisms  - Assist in developing anxiety-reducing skills  - Administer/offer alternative therapies  - Limit or eliminate stimulants  Outcome: Progressing

## 2024-09-08 NOTE — NURSING NOTE
Patient was visible in the milieu watching tv with minimal peer interaction. Denies all psych s/s. Stated she feels good today that she was out in the milieu most of the day and attended groups. No behaviors noted. No c/o pain. Took her HS medications. Safety checks ongoing.

## 2024-09-08 NOTE — NURSING NOTE
"Patient is visible on the unit and social with select peers. Patient endorses anxiety and is preoccupied with her appearance today. She asked this writer \"Do I look different from yesterday? Is my hair all over the place? Do I look like I've lost any weight?\" She continues to endorse auditory hallucinations and pauses prior to medication administration reporting \"They are telling me not to take them.\" Patient is medication compliant. Encouraged to inform staff of any needs or concerns.     "

## 2024-09-09 LAB
BASOPHILS # BLD AUTO: 0.07 THOUSANDS/ΜL (ref 0–0.1)
BASOPHILS NFR BLD AUTO: 1 % (ref 0–1)
CK SERPL-CCNC: 114 U/L (ref 26–192)
CRP SERPL QL: 3.1 MG/L
EOSINOPHIL # BLD AUTO: 0 THOUSAND/ΜL (ref 0–0.61)
EOSINOPHIL NFR BLD AUTO: 0 % (ref 0–6)
ERYTHROCYTE [DISTWIDTH] IN BLOOD BY AUTOMATED COUNT: 15.2 % (ref 11.6–15.1)
HCT VFR BLD AUTO: 43.2 % (ref 34.8–46.1)
HGB BLD-MCNC: 13.9 G/DL (ref 11.5–15.4)
IMM GRANULOCYTES # BLD AUTO: 0.05 THOUSAND/UL (ref 0–0.2)
IMM GRANULOCYTES NFR BLD AUTO: 0 % (ref 0–2)
LYMPHOCYTES # BLD AUTO: 3.11 THOUSANDS/ΜL (ref 0.6–4.47)
LYMPHOCYTES NFR BLD AUTO: 26 % (ref 14–44)
MCH RBC QN AUTO: 30.5 PG (ref 26.8–34.3)
MCHC RBC AUTO-ENTMCNC: 32.2 G/DL (ref 31.4–37.4)
MCV RBC AUTO: 95 FL (ref 82–98)
MONOCYTES # BLD AUTO: 1.31 THOUSAND/ΜL (ref 0.17–1.22)
MONOCYTES NFR BLD AUTO: 11 % (ref 4–12)
NEUTROPHILS # BLD AUTO: 7.67 THOUSANDS/ΜL (ref 1.85–7.62)
NEUTS SEG NFR BLD AUTO: 62 % (ref 43–75)
NRBC BLD AUTO-RTO: 0 /100 WBCS
PLATELET # BLD AUTO: 281 THOUSANDS/UL (ref 149–390)
PMV BLD AUTO: 9.4 FL (ref 8.9–12.7)
RBC # BLD AUTO: 4.55 MILLION/UL (ref 3.81–5.12)
WBC # BLD AUTO: 12.21 THOUSAND/UL (ref 4.31–10.16)

## 2024-09-09 PROCEDURE — 86140 C-REACTIVE PROTEIN: CPT

## 2024-09-09 PROCEDURE — 93005 ELECTROCARDIOGRAM TRACING: CPT

## 2024-09-09 PROCEDURE — 82550 ASSAY OF CK (CPK): CPT

## 2024-09-09 PROCEDURE — 99232 SBSQ HOSP IP/OBS MODERATE 35: CPT | Performed by: STUDENT IN AN ORGANIZED HEALTH CARE EDUCATION/TRAINING PROGRAM

## 2024-09-09 PROCEDURE — 85025 COMPLETE CBC W/AUTO DIFF WBC: CPT

## 2024-09-09 RX ORDER — CLOZAPINE 200 MG/1
600 TABLET ORAL
Status: DISCONTINUED | OUTPATIENT
Start: 2024-09-09 | End: 2024-09-17

## 2024-09-09 RX ADMIN — MELATONIN TAB 3 MG 3 MG: 3 TAB at 21:25

## 2024-09-09 RX ADMIN — LORAZEPAM 0.5 MG: 0.5 TABLET ORAL at 08:28

## 2024-09-09 RX ADMIN — TRAZODONE HYDROCHLORIDE 50 MG: 50 TABLET ORAL at 21:25

## 2024-09-09 RX ADMIN — ATORVASTATIN CALCIUM 10 MG: 10 TABLET, FILM COATED ORAL at 08:29

## 2024-09-09 RX ADMIN — SENNOSIDES AND DOCUSATE SODIUM 2 TABLET: 8.6; 5 TABLET ORAL at 21:25

## 2024-09-09 RX ADMIN — FLUOXETINE 120 MG: 20 SOLUTION ORAL at 08:31

## 2024-09-09 RX ADMIN — ACETAMINOPHEN 975 MG: 325 TABLET, FILM COATED ORAL at 15:14

## 2024-09-09 RX ADMIN — POLYETHYLENE GLYCOL 3350 17 G: 17 POWDER, FOR SOLUTION ORAL at 08:29

## 2024-09-09 RX ADMIN — NICOTINE 1 PATCH: 7 PATCH, EXTENDED RELEASE TRANSDERMAL at 08:29

## 2024-09-09 RX ADMIN — FAMOTIDINE 20 MG: 20 TABLET ORAL at 08:28

## 2024-09-09 RX ADMIN — CARVEDILOL 6.25 MG: 6.25 TABLET, FILM COATED ORAL at 08:29

## 2024-09-09 RX ADMIN — Medication 15 ML: at 08:28

## 2024-09-09 RX ADMIN — LORAZEPAM 0.5 MG: 0.5 TABLET ORAL at 18:12

## 2024-09-09 RX ADMIN — CARVEDILOL 6.25 MG: 6.25 TABLET, FILM COATED ORAL at 16:24

## 2024-09-09 RX ADMIN — FAMOTIDINE 20 MG: 20 TABLET ORAL at 18:12

## 2024-09-09 RX ADMIN — FLUTICASONE FUROATE 1 PUFF: 100 POWDER RESPIRATORY (INHALATION) at 08:29

## 2024-09-09 RX ADMIN — CYANOCOBALAMIN TAB 1000 MCG 1000 MCG: 1000 TAB at 08:29

## 2024-09-09 RX ADMIN — CLOZAPINE 600 MG: 200 TABLET ORAL at 21:25

## 2024-09-09 NOTE — TREATMENT TEAM
PRN Tylenol 975 mg given for 10/10 back pain at 1514. Reassessment 3/10 back pain. PRN somewhat effective.    09/09/24 1614   Pain Assessment Post Intervention   Pain Assessment Tool Used: 0-10   Post Intervention Pain Score 3   Post Intervention Pain Location/Orientation Location: Back   Response to Interventions somewhat effective

## 2024-09-09 NOTE — PLAN OF CARE
Problem: Alteration in Thoughts and Perception  Goal: Verbalize thoughts and feelings  Description: Interventions:  - Promote a nonjudgmental and trusting relationship with the patient through active listening and therapeutic communication  - Assess patient's level of functioning, behavior and potential for risk  - Engage patient in 1 on 1 interactions  - Encourage patient to express fears, feelings, frustrations, and discuss symptoms    - [place] patient to reality, help patient recognize reality-based thinking   - Administer medications as ordered and assess for potential side effects  - Provide the patient education related to the signs and symptoms of the illness and desired effects of prescribed medications  Outcome: Progressing     Problem: Ineffective Coping  Goal: Participates in unit activities  Description: Interventions:  - Provide therapeutic environment   - Provide required programming   - Redirect inappropriate behaviors   Outcome: Progressing     Problem: Risk for Self Injury/Neglect  Goal: Treatment Goal: Remain safe during length of stay, learn and adopt new coping skills, and be free of self-injurious ideation, impulses and acts at the time of discharge  Outcome: Progressing  Goal: Verbalize thoughts and feelings  Description: Interventions:  - Assess and re-assess patient's lethality and potential for self-injury  - Engage patient in 1:1 interactions, daily, for a minimum of 15 minutes  - Encourage patient to express feelings, fears, frustrations, hopes  - Establish rapport/trust with patient   Outcome: Progressing  Goal: Refrain from harming self  Description: Interventions:  - Monitor patient closely, per order  - Develop a trusting relationship  - Supervise medication ingestion, monitor effects and side effects   Outcome: Progressing  Goal: Attend and participate in unit activities, including therapeutic, recreational, and educational groups  Description: Interventions:  - Provide therapeutic  and educational activities daily, encourage attendance and participation, and document same in the medical record  - Obtain collateral information, encourage visitation and family involvement in care   Outcome: Progressing  Goal: Recognize maladaptive responses and adopt new coping mechanisms  Outcome: Progressing

## 2024-09-09 NOTE — NURSING NOTE
Patient visible and social with select peers. Patient denies SI/HI/VH. Endorses AH. Patient is med compliant. Patient appetite good with 100 % meals eaten. Patient given bible upon request. Patient appreciative. Patient attended groups. VSS. Continual safety checks ongoing

## 2024-09-09 NOTE — NURSING NOTE
Patient was visible and social with select peers in the milieu while listening to the radio and dancing. Denies all psych s/s. No behaviors noted. No c/o pain. Took her HS medications. Safety checks ongoing.

## 2024-09-09 NOTE — PROGRESS NOTES
09/09/24 0815   Team Meeting   Meeting Type Daily Rounds   Initial Conference Date 09/09/24   Next Conference Date 09/10/24   Team Members Present   Team Members Present Physician;Nurse;;   Physician Team Member JAIR Ponce   Nursing Team Member Clarissa   Care Management Team Member Anum   Social Work Team Member Ayse   Patient/Family Present   Patient Present No   Patient's Family Present No     Anxious about appearance, labs done today, AH telling her to not take her meds, discharge CRR

## 2024-09-09 NOTE — PROGRESS NOTES
Progress Note - Behavioral Health   Meli Nowak 59 y.o. female MRN: [id number]  Unit/Bed#: [facility] 651-01 Encounter: [id number]    Patient was seen today for continuation of care, records reviewed and patient was discussed with the morning case review team.    Meli was seen today for psychiatric follow-up.  On assessment today, Meli was preoccupied with having Vaseline and make-up removal.  Currently denying all symptoms, patient is noted to be more guarded on approach.  Auditory hallucinations endorsed, patient states that the voices keep telling her not to take her medication.  Clozaril to be increased to 600 mg nightly to assist with ongoing hallucinations and paranoia.  We will obtain weekly CBC, CK and CRP this evening.  Clozaril level to be obtained next Monday, we will also order an EKG for continued cardiac monitoring.  Sleep and appetite well.  Tentative discharge ongoing pending there are placement.    Meli denies acute suicidal/self-harm ideation/intent/plan upon direct inquiry at this time.  Meli remains behaviorally appropriate, no agitation or aggression noted on exam or in report. Impulse control is intact.  Meli remains adherent to her current psychotropic medication regimen and denies any side effects from medications, as well as none noted on exam.    Vitals:  Vitals:    09/09/24 0734   BP: 165/81   Pulse: 104   Resp: 17   Temp: (!) 97.1 °F (36.2 °C)   SpO2: 96%       Laboratory Results:  I have personally reviewed all pertinent laboratory/tests results.    Psychiatric Review of Systems:  Behavior over the last 24 hours:  unchanged.   Sleep: good  Appetite: good  Medication side effects: none  ROS: no complaints, denies shortness of breath or chest pain and all other systems are negative for acute changes    Mental Status Evaluation:  Appearance:  marginal hygiene, dressed in hospital attire, overweight   Behavior:  cooperative, calm, guarded   Speech:  normal rate and volume   Mood:  less anxious,  less depressed   Affect:  constricted   Thought Process:  illogical, perseverative, slowing of thoughts, concrete   Thought Content:  paranoid ideation, negative thinking, ruminating thoughts   Perceptual Disturbances: auditory hallucinations still present, but less frequent   Risk Potential: Suicidal ideation - None  Homicidal ideation - None  Potential for aggression - No   Memory:  recent and remote memory grossly intact   Sensorium  person, place, and time/date      Consciousness:  alert and awake   Attention: attention span and concentration are age appropriate   Insight:  limited   Judgment: limited   Gait/Station: normal gait/station   Motor Activity: no abnormal movements   Progress Toward Goals:   Meli is progressing towards goals of inpatient psychiatric treatment by continued medication compliance and is attending therapeutic modalities on the milieu. However, the patient continues to require inpatient psychiatric hospitalization for continued medication management and titration to optimize symptom reduction, improve sleep hygiene, and demonstrate adequate self-care.    Assessment & Plan   Principal Problem:    Schizoaffective disorder, bipolar type (HCC)  Active Problems:    Medical clearance for psychiatric admission    Type 2 diabetes mellitus (HCC)    Obesity    Tobacco abuse    Hyperlipidemia    Esophageal reflux    Essential (primary) hypertension    Vitamin B12 deficiency    Hypoxia    Ambulatory dysfunction    Mild protein-calorie malnutrition (HCC)      Recommended Treatment: Treatment plan and medication changes discussed and per the attending physician the plan is:    1.Continue with group therapy, milieu therapy and occupational therapy  2.Behavioral Health checks every 7 minutes  3.Continue frequent safety checks and vitals per unit protocol  4.Continue with SLIM medical management as indicated  5.Continue with current medication regimen  6.Will review labs in the a.m.  7.Disposition  Planning: Discharge planning and efforts remain ongoing    Behavioral Health Medications: all current active meds have been reviewed and continue current psychiatric medications.  Current Facility-Administered Medications   Medication Dose Route Frequency Provider Last Rate    acetaminophen  650 mg Oral Q4H PRN Marie Ziegler, CHRISTOPHERNP      acetaminophen  650 mg Oral Q4H PRN Marie Ziegler, CRNP      acetaminophen  975 mg Oral Q6H PRN Marie Ziegler, JOSE ELIAS      aluminum-magnesium hydroxide-simethicone  30 mL Oral Q4H PRN Parris Bolanos, JOSE ELIAS      Artificial Tears  1 drop Both Eyes Q6H PRN Dereje Banuelos MD      atorvastatin  10 mg Oral Daily Marie Ziegler, CHRISTOPHERNP      bisacodyl  10 mg Rectal Daily PRN Marie Ziegler, JOSE ELIAS      carvedilol  6.25 mg Oral BID With Meals Marie Ziegler, JOSE ELIAS      cloZAPine  550 mg Oral HS Marie Ziegler, JOSE ELIAS      cyanocobalamin  1,000 mcg Oral Daily Marie Ziegler, JOSE ELIAS      famotidine  20 mg Oral BID Shan Fitzgerald DO      FLUoxetine  120 mg Oral Daily Marie Ziegler, JOSE ELIAS      fluticasone  1 puff Inhalation Daily Marie Ziegler, JOSE ELIAS      hydrOXYzine HCL  25 mg Oral Q6H PRN Max 4/day Marie Ziegler, CRNP      hydrOXYzine HCL  50 mg Oral Q6H PRN Max 4/day Marie Ziegler, JOSE ELIAS      ipratropium-albuterol  3 mL Nebulization Q4H PRN Darin Lawton MD      LORazepam  0.5 mg Oral BID Marie Ziegler, JOSE ELIAS      Or    LORazepam  0.5 mg Intramuscular BID Marie Ziegler, JOSE ELIAS      LORazepam  1 mg Intramuscular Q6H PRN Max 3/day Marie Ziegler, JOSE ELIAS      LORazepam  1 mg Oral Q6H PRN Max 3/day JOSE ELIAS Figueroa      melatonin  3 mg Oral HS Marie Ziegler, JOSE ELIAS      Multivitamin  15 mL Oral Daily Marie Ziegler, JOSE ELIAS      nicotine  1 patch Transdermal Daily JOSE ELIAS Figueroa      OLANZapine  2.5 mg Oral Q6H PRN Dereje Banuelos MD      Or    OLANZapine  2.5 mg Intramuscular Q6H PRN Dereje Banuelos MD      OLANZapine  5 mg Oral Q6H PRN Dereje Banuelos MD      Or    OLANZapine   5 mg Intramuscular Q6H PRN Dereje Banuelos MD      ondansetron  4 mg Oral Q6H PRN Darin Lawton MD      polyethylene glycol  17 g Oral Daily PRN JOSE ELIAS Figueroa      polyethylene glycol  17 g Oral Daily JOSE ELIAS Ortega      senna-docusate sodium  2 tablet Oral HS Rehana Borrego PA-C      traZODone  50 mg Oral HS JOSE ELIAS Figueroa         Risks / Benefits of Treatment:  Risks, benefits, and possible side effects of medications explained to patient and patient verbalizes understanding and agreement for treatment.    Counseling / Coordination of Care:  Patient's progress reviewed with nursing staff.  Medications, treatment progress and treatment plan reviewed with patient.  Supportive counseling provided to the patient.    Total floor/unit time spent today 25 minutes. Greater than 50% of total time was spent with the patient and / or family counseling and / or coordination of care. A description of the counseling / coordination of care: medication education, treatment plan, supportive therapy.    This note was completed in part utilizing Dragon dictation Software. Grammatical, translation, syntax errors, random word insertions, spelling mistakes, and incomplete sentences may be an occasional consequence of this system secondary to software limitations with voice recognition, ambient noise, and hardware issues. If you have any questions or concerns about the content, text, or information contained within the body of this dictation, please contact the provider for clarification

## 2024-09-09 NOTE — PROGRESS NOTES
Pt attended groups.  Pt restless and anxious.  Pt needed prompting for focus.  Pt late for afternoon group and needed redirection for bringing radio in while playing.  Lacks insight.      09/09/24 1000   Activity/Group Checklist   Group Other (Comment)  (Community meeting: wellness and coping skills)   Attendance Attended   Attendance Duration (min) 31-45   Interactions Interacted appropriately   Affect/Mood Other (Comment)  (anxious (wanted to leave))   Goals Achieved Identified feelings;Identified triggers;Identified relapse prevention strategies;Discussed coping strategies;Able to engage in interactions;Able to listen to others;Able to reflect/comment on own behavior;Able to manage/cope with feelings;Verbalized increased hopefulness;Able to self-disclose;Able to recieve feedback

## 2024-09-10 LAB
ATRIAL RATE: 79 BPM
ATRIAL RATE: 79 BPM
ATRIAL RATE: 81 BPM
P AXIS: 173 DEGREES
P AXIS: 30 DEGREES
P AXIS: 30 DEGREES
PR INTERVAL: 132 MS
PR INTERVAL: 132 MS
PR INTERVAL: 146 MS
QRS AXIS: 138 DEGREES
QRS AXIS: 58 DEGREES
QRS AXIS: 58 DEGREES
QRSD INTERVAL: 98 MS
QT INTERVAL: 386 MS
QT INTERVAL: 386 MS
QT INTERVAL: 394 MS
QTC INTERVAL: 442 MS
QTC INTERVAL: 442 MS
QTC INTERVAL: 457 MS
T WAVE AXIS: 117 DEGREES
T WAVE AXIS: 61 DEGREES
T WAVE AXIS: 61 DEGREES
VENTRICULAR RATE: 79 BPM
VENTRICULAR RATE: 79 BPM
VENTRICULAR RATE: 81 BPM

## 2024-09-10 PROCEDURE — 99232 SBSQ HOSP IP/OBS MODERATE 35: CPT | Performed by: STUDENT IN AN ORGANIZED HEALTH CARE EDUCATION/TRAINING PROGRAM

## 2024-09-10 PROCEDURE — 93010 ELECTROCARDIOGRAM REPORT: CPT | Performed by: INTERNAL MEDICINE

## 2024-09-10 RX ORDER — MIRTAZAPINE 7.5 MG/1
7.5 TABLET, FILM COATED ORAL
Status: DISCONTINUED | OUTPATIENT
Start: 2024-09-10 | End: 2024-12-30 | Stop reason: HOSPADM

## 2024-09-10 RX ORDER — PRAZOSIN HYDROCHLORIDE 1 MG/1
2 CAPSULE ORAL
Status: DISCONTINUED | OUTPATIENT
Start: 2024-09-10 | End: 2024-10-29

## 2024-09-10 RX ADMIN — FLUOXETINE 120 MG: 20 SOLUTION ORAL at 08:02

## 2024-09-10 RX ADMIN — FAMOTIDINE 20 MG: 20 TABLET ORAL at 17:00

## 2024-09-10 RX ADMIN — FAMOTIDINE 20 MG: 20 TABLET ORAL at 08:01

## 2024-09-10 RX ADMIN — HYDROXYZINE HYDROCHLORIDE 50 MG: 50 TABLET, FILM COATED ORAL at 03:13

## 2024-09-10 RX ADMIN — CLOZAPINE 600 MG: 200 TABLET ORAL at 21:29

## 2024-09-10 RX ADMIN — LORAZEPAM 0.5 MG: 0.5 TABLET ORAL at 17:00

## 2024-09-10 RX ADMIN — MELATONIN TAB 3 MG 3 MG: 3 TAB at 21:29

## 2024-09-10 RX ADMIN — POLYETHYLENE GLYCOL 3350 17 G: 17 POWDER, FOR SOLUTION ORAL at 08:03

## 2024-09-10 RX ADMIN — FLUTICASONE FUROATE 1 PUFF: 100 POWDER RESPIRATORY (INHALATION) at 08:02

## 2024-09-10 RX ADMIN — ACETAMINOPHEN 975 MG: 325 TABLET, FILM COATED ORAL at 09:03

## 2024-09-10 RX ADMIN — SENNOSIDES AND DOCUSATE SODIUM 2 TABLET: 8.6; 5 TABLET ORAL at 21:29

## 2024-09-10 RX ADMIN — DEXTRAN 70, GLYCERIN, HYPROMELLOSE 1 DROP: 1; 2; 3 SOLUTION/ DROPS OPHTHALMIC at 01:51

## 2024-09-10 RX ADMIN — LORAZEPAM 0.5 MG: 0.5 TABLET ORAL at 08:01

## 2024-09-10 RX ADMIN — ATORVASTATIN CALCIUM 10 MG: 10 TABLET, FILM COATED ORAL at 08:01

## 2024-09-10 RX ADMIN — ACETAMINOPHEN 975 MG: 325 TABLET, FILM COATED ORAL at 15:09

## 2024-09-10 RX ADMIN — PRAZOSIN HYDROCHLORIDE 2 MG: 1 CAPSULE ORAL at 21:29

## 2024-09-10 RX ADMIN — CARVEDILOL 6.25 MG: 6.25 TABLET, FILM COATED ORAL at 08:01

## 2024-09-10 RX ADMIN — TRAZODONE HYDROCHLORIDE 50 MG: 50 TABLET ORAL at 21:29

## 2024-09-10 RX ADMIN — CYANOCOBALAMIN TAB 1000 MCG 1000 MCG: 1000 TAB at 08:01

## 2024-09-10 RX ADMIN — Medication 15 ML: at 08:02

## 2024-09-10 RX ADMIN — CARVEDILOL 6.25 MG: 6.25 TABLET, FILM COATED ORAL at 16:06

## 2024-09-10 NOTE — NURSING NOTE
Patient at nurses desk saying she is unable to sleep, hyperverbal, disorganized and mutiple somatic complaints. Patient requesting additional trazodone but given earlier this evening.  Medicated with hydroxyzine 50 mg. Back to room with much encouragement.

## 2024-09-10 NOTE — NURSING NOTE
Patient visible on the unit. Patient appears hyperverbal and preoccupied. Patient denies psych s/s. Patient med compliant. Appetite intact with 100% breakfast eaten. VSS. Pulse ox taken after c/o of SOB and was 95% on room air. Prn Tylenol given for c/o headache and sore throat. Continual safety checks ongoing.

## 2024-09-10 NOTE — TREATMENT TEAM
09/10/24 1003   Pain Assessment Post Intervention   Pain Assessment Tool Used: 0-10   Post Intervention Pain Score No Pain   Post Intervention Pain Location/Orientation Location: Head   Response to Interventions effective     PRN Tylenol 975 reported effective

## 2024-09-10 NOTE — QUICK NOTE
Notified by JOSE ELIAS tomorrow Toney the patient's white count was elevated today.  Patient's white count was 12.21.  Patient is afebrile vital signs are stable has no outward signs of any infection and there are multiple reasons patient's white blood cells could be elevated secondary to most likely cause dehydration.  Will repeat CBC on Friday, 9/13/2024.  Any medical questions please call S[facility].

## 2024-09-10 NOTE — ASSESSMENT & PLAN NOTE
Progress towards goals  Awaiting CRR placement  Continue medications as prescribed  Clozaril 600 mg nightly for schizoaffective disorder  Prozac 120 mg daily for depressive symptoms   Ativan 0.5 mg twice daily for anxiety and paranoia  Melatonin 3 mg nightly for insomnia  Prazosin 2 mg nightly for PTSD associated nightmares   trazodone 50 mg nightly for insomnia

## 2024-09-10 NOTE — TREATMENT TEAM
09/10/24 1508   Pain Assessment   Pain Assessment Tool 0-10   Pain Score 10 - Worst Possible Pain   Pain Location/Orientation Location: Back   Pain Radiating Towards na   Pain Onset/Description Onset: Gradual   Effect of Pain on Daily Activities mood   Patient's Stated Pain Goal No pain   Hospital Pain Intervention(s) Medication (See MAR)   Multiple Pain Sites No     PRN Tylenol 975 given for generalized pain

## 2024-09-10 NOTE — NURSING NOTE
Patient is medication compliant and meal compliant. She is preoccupied with mint tea bags and getting medication from staff. She endorses auditory hallucinations but would not tell this writer what they were saying. She endorses anxiety. Denies SI and HI at this time. Safety checks ongoing.

## 2024-09-10 NOTE — ASSESSMENT & PLAN NOTE
"Lab Results   Component Value Date    HGBA1C 6.4 (H) 05/03/2024       No results for input(s): \"POCGLU\" in the last 72 hours.    Blood Sugar Average: Last 72 hrs:      "

## 2024-09-10 NOTE — ASSESSMENT & PLAN NOTE
Malnutrition Findings:   Adult Malnutrition type: Social/enviromental  Adult Degree of Malnutrition: Malnutrition of mild degree  Malnutrition Characteristics: Inadequate energy, Weight loss                  360 Statement: Mild protein calorie malnutrition in the setting of mental illness as evidenced by 5% wt loss <1 month, <50-75% intake of estimated needs treated with Dysphagia 3 diet and supplements TID.    BMI Findings:           Body mass index is 34.15 kg/m².

## 2024-09-10 NOTE — PROGRESS NOTES
"Progress Note - Behavioral Health   Meli ESPINOZA Nowak 59 y.o. female MRN: [id number]  Unit/Bed#: [facility] 651-01 Encounter: [id number]  Assessment & Plan  Schizoaffective disorder, bipolar type (HCC)  Progress towards goals  Awaiting CRR placement  Continue medications as prescribed  Clozaril 600 mg nightly for schizoaffective disorder  Prozac 120 mg daily for depressive symptoms   Ativan 0.5 mg twice daily for anxiety and paranoia  Melatonin 3 mg nightly for insomnia  Prazosin 2 mg nightly for PTSD associated nightmares   trazodone 50 mg nightly for insomnia  Medical clearance for psychiatric admission    Type 2 diabetes mellitus (HCC)  Lab Results   Component Value Date    HGBA1C 6.4 (H) 05/03/2024       No results for input(s): \"POCGLU\" in the last 72 hours.    Blood Sugar Average: Last 72 hrs:      Obesity    Tobacco abuse    Hyperlipidemia    Esophageal reflux    Essential (primary) hypertension  Managed by [facility]  Coreg 6.25 mg twice daily  Vitamin B12 deficiency    Hypoxia    Ambulatory dysfunction    Mild protein-calorie malnutrition (HCC)  Malnutrition Findings:   Adult Malnutrition type: Social/enviromental  Adult Degree of Malnutrition: Malnutrition of mild degree  Malnutrition Characteristics: Inadequate energy, Weight loss                  360 Statement: Mild protein calorie malnutrition in the setting of mental illness as evidenced by 5% wt loss <1 month, <50-75% intake of estimated needs treated with Dysphagia 3 diet and supplements TID.    BMI Findings:           Body mass index is 34.15 kg/m².       Plan   Recommended Treatment: Treatment plan and medication changes discussed and per the attending physician the plan is:    1.Continue with group therapy, milieu therapy and occupational therapy  2.Behavioral Health checks every 7 minutes  3.Continue frequent safety checks and vitals per unit protocol  4.Continue with SLIM medical management as indicated  5.Continue with current medication regimen  6.Will " review labs in the a.m.  7.Disposition Planning: Discharge planning and efforts remain ongoing    Behavioral Health Medications: all current active meds have been reviewed and continue current psychiatric medications.  Current Facility-Administered Medications   Medication Dose Route Frequency Provider Last Rate    acetaminophen  650 mg Oral Q4H PRN Marie Ziegler, CHRISTOPHERNP      acetaminophen  650 mg Oral Q4H PRN Marie Ziegler, JOSE ELIAS      acetaminophen  975 mg Oral Q6H PRN Marie Ziegler, JOSE ELIAS      aluminum-magnesium hydroxide-simethicone  30 mL Oral Q4H PRN JOSE ELIAS Puri      Artificial Tears  1 drop Both Eyes Q6H PRN Dereje Banuelos MD      atorvastatin  10 mg Oral Daily Marie Ziegler, JOSE ELIAS      bisacodyl  10 mg Rectal Daily PRN JOSE ELIAS Figueroa      carvedilol  6.25 mg Oral BID With Meals Marie Ziegler, JOSE ELIAS      cloZAPine  600 mg Oral HS Marie Ziegler, JOSE ELIAS      cyanocobalamin  1,000 mcg Oral Daily Marie Ziegler, JOSE ELIAS      famotidine  20 mg Oral BID Shan Fitzgerald DO      FLUoxetine  120 mg Oral Daily Marie Ziegler, JOSE ELIAS      fluticasone  1 puff Inhalation Daily JOSE ELIAS Figueroa      hydrOXYzine HCL  25 mg Oral Q6H PRN Max 4/day Marie Ziegler, JOSE ELIAS      hydrOXYzine HCL  50 mg Oral Q6H PRN Max 4/day JOSE ELIAS Figueroa      ipratropium-albuterol  3 mL Nebulization Q4H PRN Darin Lawton MD      LORazepam  0.5 mg Oral BID JOSE ELIAS Figueroa      Or    LORazepam  0.5 mg Intramuscular BID Marie Ziegler, JOSE ELIAS      LORazepam  1 mg Intramuscular Q6H PRN Max 3/day Marie Ziegler, JOSE ELIAS      LORazepam  1 mg Oral Q6H PRN Max 3/day JOSE ELIAS Figueroa      melatonin  3 mg Oral HS Marie Ziegler, JOSE ELIAS      mirtazapine  7.5 mg Oral HS PRN JOSE ELIAS Figueroa      Multivitamin  15 mL Oral Daily Marie Ziegler, JOSE ELIAS      nicotine  1 patch Transdermal Daily Marie Ziegler, JOSE ELIAS      OLANZapine  2.5 mg Oral Q6H PRN Dereje Banuelos MD      Or    OLANZapine  2.5 mg Intramuscular  Q6H PRN Dereje Banuelos MD      OLANZapine  5 mg Oral Q6H PRN Dereje Banuelos MD      Or    OLANZapine  5 mg Intramuscular Q6H PRN Dereje Banuelos MD      ondansetron  4 mg Oral Q6H PRN Darin Lawton MD      polyethylene glycol  17 g Oral Daily PRN JOSE ELIAS Figueroa      polyethylene glycol  17 g Oral Daily Kristen VaniJOSE ELIAS Weller      prazosin  2 mg Oral HS JOSE ELIAS Figueroa      senna-docusate sodium  2 tablet Oral HS Rehana Borrego PA-C      traZODone  50 mg Oral HS JOSE ELIAS Figueroa         Risks / Benefits of Treatment:  Risks, benefits, and possible side effects of medications explained to patient and patient verbalizes understanding and agreement for treatment.       Subjective    Patient was seen today for continuation of care, records reviewed and patient was discussed with the morning case review team.    Meli was seen today for psychiatric follow-up.  On assessment today, Meli was somatically preoccupied and intrusive.  Stating that she cannot breathe, patient noted to be asking this writer as well as other staff members to place oxygen on despite WNL O2 saturations.  Education provided, patient continues to exhibit limited understanding and will need continued education.  Also noted to be more religiously preoccupied today, patient asked this writer if she was aware of the recent passing of Romero Naylor and did she know if he made it to [facility].  Redirection mildly successful.  We will continue with current dose of 600 mg of Clozaril to assist with paranoia, [employer]alism preoccupation and psychosis.  CBC, C RP and CK obtained on 9/9/2024, WBCs noted to be elevated at 12.21 although patient remains afebrile.  Slim made aware, no workup to be completed at this time.  We will continue to monitor for any changes in condition.  ECG also within normal limits, QTc currently 442.  Patient reports sleep as interrupted with frequent nightmares, we will reintroduce prazosin 2 mg nightly into  regimen to assist with symptoms.  Tentative discharge ongoing.    Meli denies acute suicidal/self-harm ideation/intent/plan upon direct inquiry at this time.  Meli remains behaviorally appropriate, no agitation or aggression noted on exam or in report.  Meli also denies HI/AH/VH, and does not appear overtly manic.  No overt delusions or paranoia are verbalized. Impulse control is intact.  Meli remains adherent to her current psychotropic medication regimen and denies any side effects from medications, as well as none noted on exam.    Psychiatric Review of Systems:  Behavior over the last 24 hours:  unchanged.   Sleep: interrupted  Appetite: good  Medication side effects: none  ROS: no complaints, denies shortness of breath or chest pain and all other systems are negative for acute changes        Objective    Vitals:  Vitals:    09/10/24 1043   BP:    Pulse:    Resp:    Temp:    SpO2: 95%       Laboratory Results:  I have personally reviewed all pertinent laboratory/tests results.  Labs in last 72 hours:   Recent Labs     09/09/24  0526   WBC 12.21*   RBC 4.55   HGB 13.9   HCT 43.2      RDW 15.2*   NEUTROABS 7.67*       Mental Status Evaluation:  Appearance:  marginal hygiene, overweight   Behavior:  cooperative, bizarre, guarded, slow responses   Speech:  normal rate and volume   Mood:  less depressed   Affect:  constricted, mood-congruent   Thought Process:  disorganized, illogical, perseverative, concrete   Thought Content:  Zoroastrian and somatic preoccupation, paranoid ideation, negative thoughts, ruminating thoughts   Perceptual Disturbances: talks to self at times   Risk Potential: Suicidal ideation - None  Homicidal ideation - None  Potential for aggression - No   Memory:  recent and remote memory grossly intact   Sensorium  person, place, and time/date      Consciousness:  alert and awake   Attention: attention span and concentration are age appropriate   Insight:  limited   Judgment: limited    Gait/Station: normal gait/station   Motor Activity: no abnormal movements     Progress Toward Goals:   Meli is progressing towards goals of inpatient psychiatric treatment by continued medication compliance and is attending therapeutic modalities on the milieu. However, the patient continues to require inpatient psychiatric hospitalization for continued medication management and titration to optimize symptom reduction, improve sleep hygiene, and demonstrate adequate self-care.    Counseling / Coordination of Care:  Patient's progress reviewed with nursing staff.  Medications, treatment progress and treatment plan reviewed with patient.  Supportive counseling provided to the patient.    Total floor/unit time spent today 25 minutes. Greater than 50% of total time was spent with the patient and / or family counseling and / or coordination of care. A description of the counseling / coordination of care: medication education, treatment plan, supportive therapy.    This note was completed in part utilizing Dragon dictation Software. Grammatical, translation, syntax errors, random word insertions, spelling mistakes, and incomplete sentences may be an occasional consequence of this system secondary to software limitations with voice recognition, ambient noise, and hardware issues. If you have any questions or concerns about the content, text, or information contained within the body of this dictation, please contact the provider for clarification

## 2024-09-10 NOTE — PLAN OF CARE
Problem: Prexisting or High Potential for Compromised Skin Integrity  Goal: Skin integrity is maintained or improved  Description: INTERVENTIONS:  - Identify patients at risk for skin breakdown  - Assess and monitor skin integrity  - Assess and monitor nutrition and hydration status  - Monitor labs   - Assess for incontinence   - Turn and reposition patient  - Assist with mobility/ambulation  - Relieve pressure over bony prominences  - Avoid friction and shearing  - Provide appropriate hygiene as needed including keeping skin clean and dry  - Evaluate need for skin moisturizer/barrier cream  - Collaborate with interdisciplinary team   - Patient/family teaching  - Consider wound care consult   Outcome: Progressing     Problem: Alteration in Thoughts and Perception  Goal: Treatment Goal: Gain control of psychotic behaviors/thinking, reduce/eliminate presenting symptoms and demonstrate improved reality functioning upon discharge  Outcome: Progressing  Goal: Verbalize thoughts and feelings  Description: Interventions:  - Promote a nonjudgmental and trusting relationship with the patient through active listening and therapeutic communication  - Assess patient's level of functioning, behavior and potential for risk  - Engage patient in 1 on 1 interactions  - Encourage patient to express fears, feelings, frustrations, and discuss symptoms    - [place] patient to reality, help patient recognize reality-based thinking   - Administer medications as ordered and assess for potential side effects  - Provide the patient education related to the signs and symptoms of the illness and desired effects of prescribed medications  Outcome: Progressing  Goal: Refrain from acting on delusional thinking/internal stimuli  Description: Interventions:  - Monitor patient closely, per order   - Utilize least restrictive measures   - Set reasonable limits, give positive feedback for acceptable   - Administer medications as ordered and monitor  of potential side effects  Outcome: Progressing  Goal: Agree to be compliant with medication regime, as prescribed and report medication side effects  Description: Interventions:  - Offer appropriate PRN medication and supervise ingestion; conduct AIMS, as needed   Outcome: Progressing  Goal: Attend and participate in unit activities, including therapeutic, recreational, and educational groups  Description: Interventions:  -Encourage Visitation and family involvement in care  Outcome: Progressing  Goal: Recognize dysfunctional thoughts, communicate reality-based thoughts at the time of discharge  Description: Interventions:  - Provide medication and psycho-education to assist patient in compliance and developing insight into his/her illness   Outcome: Progressing  Goal: Complete daily ADLs, including personal hygiene independently, as able  Description: Interventions:  - Observe, teach, and assist patient with ADLS  - Monitor and promote a balance of rest/activity, with adequate nutrition and elimination   Outcome: Progressing     Problem: Risk for Self Injury/Neglect  Goal: Treatment Goal: Remain safe during length of stay, learn and adopt new coping skills, and be free of self-injurious ideation, impulses and acts at the time of discharge  Outcome: Progressing  Goal: Verbalize thoughts and feelings  Description: Interventions:  - Assess and re-assess patient's lethality and potential for self-injury  - Engage patient in 1:1 interactions, daily, for a minimum of 15 minutes  - Encourage patient to express feelings, fears, frustrations, hopes  - Establish rapport/trust with patient   Outcome: Progressing  Goal: Refrain from harming self  Description: Interventions:  - Monitor patient closely, per order  - Develop a trusting relationship  - Supervise medication ingestion, monitor effects and side effects   Outcome: Progressing  Goal: Attend and participate in unit activities, including therapeutic, recreational, and  educational groups  Description: Interventions:  - Provide therapeutic and educational activities daily, encourage attendance and participation, and document same in the medical record  - Obtain collateral information, encourage visitation and family involvement in care   Outcome: Progressing  Goal: Recognize maladaptive responses and adopt new coping mechanisms  Outcome: Progressing     Problem: Depression  Goal: Treatment Goal: Demonstrate behavioral control of depressive symptoms, verbalize feelings of improved mood/affect, and adopt new coping skills prior to discharge  Outcome: Progressing  Goal: Verbalize thoughts and feelings  Description: Interventions:  - Assess and re-assess patient's level of risk   - Engage patient in 1:1 interactions, daily, for a minimum of 15 minutes   - Encourage patient to express feelings, fears, frustrations, hopes   Outcome: Progressing  Goal: Refrain from isolation  Description: Interventions:  - Develop a trusting relationship   - Encourage socialization   Outcome: Progressing  Goal: Refrain from self-neglect  Outcome: Progressing  Goal: Attend and participate in unit activities, including therapeutic, recreational, and educational groups  Description: Interventions:  - Provide therapeutic and educational activities daily, encourage attendance and participation, and document same in the medical record   Outcome: Progressing     Problem: Potential for Falls  Goal: Patient will remain free of falls  Description: INTERVENTIONS:  - Educate patient on patient safety including physical limitations  - Instruct patient to call for assistance with activity   - Consult OT/PT to assist with strengthening/mobility   - Keep Call bell within reach  - Keep bed low and locked with side rails adjusted as appropriate  - Keep care items and personal belongings within reach  - Initiate and maintain comfort rounds  - Offer Toileting every 2 Hours, in advance of need  - Initiate/Maintain bed and  chair alarm  - Obtain necessary fall risk management equipment: walker, wheelchair  - Apply yellow socks and bracelet for high fall risk patients  - Patient moved to room near nurses station  Outcome: Progressing     Problem: SAFETY,RESTRAINT: NV/NON-SELF DESTRUCTIVE BEHAVIOR  Goal: Remains free of harm/injury (restraint for non violent/non self-detsructive behavior)  Description: INTERVENTIONS:  - Instruct patient/family regarding restraint use   - Assess and monitor physiologic and psychological status   - Provide interventions and comfort measures to meet assessed patient needs   - Identify and implement measures to help patient regain control  - Assess readiness for release of restraint   Outcome: Progressing  Goal: Returns to optimal restraint-free functioning  Description: INTERVENTIONS:  - Assess the patient's behavior and symptoms that indicate continued need for restraint  - Identify and implement measures to help patient regain control  - Assess readiness for release of restraint   Outcome: Progressing     Problem: Nutrition/Hydration-ADULT  Goal: Nutrient/Hydration intake appropriate for improving, restoring or maintaining nutritional needs  Description: Monitor and assess patient's nutrition/hydration status for malnutrition. Collaborate with interdisciplinary team and initiate plan and interventions as ordered.  Monitor patient's weight and dietary intake as ordered or per policy. Utilize nutrition screening tool and intervene as necessary. Determine patient's food preferences and provide high-protein, high-caloric foods as appropriate.     INTERVENTIONS:  - Monitor oral intake, urinary output, labs, and treatment plans  - Assess nutrition and hydration status and recommend course of action  - Evaluate amount of meals eaten  - Assist patient with eating if necessary   - Allow adequate time for meals  - Recommend/ encourage appropriate diets, oral nutritional supplements, and vitamin/mineral  supplements  - Order, calculate, and assess calorie counts as needed  - Recommend, monitor, and adjust tube feedings and TPN/PPN based on assessed needs  - Assess need for intravenous fluids  - Provide specific nutrition/hydration education as appropriate  - Include patient/family/caregiver in decisions related to nutrition  Outcome: Progressing

## 2024-09-10 NOTE — PROGRESS NOTES
09/10/24 0749   Team Meeting   Meeting Type Daily Rounds   Initial Conference Date 09/10/24   Next Conference Date 09/11/24   Team Members Present   Team Members Present Physician;Nurse;;   Physician Team Member JAIR Ponce J. Stives   Nursing Team Member Clarissa   Care Management Team Member Anum   Social Work Team Member Ayse   Patient/Family Present   Patient Present No   Patient's Family Present No     Yelling in hallway that she cannot breathe, O2 98%, put her on oxygen, up all night reported by patient but staff states she slept intermittently, discharge pending CRR.

## 2024-09-11 PROCEDURE — 99232 SBSQ HOSP IP/OBS MODERATE 35: CPT | Performed by: STUDENT IN AN ORGANIZED HEALTH CARE EDUCATION/TRAINING PROGRAM

## 2024-09-11 RX ADMIN — CYANOCOBALAMIN TAB 1000 MCG 1000 MCG: 1000 TAB at 08:07

## 2024-09-11 RX ADMIN — CLOZAPINE 600 MG: 200 TABLET ORAL at 21:12

## 2024-09-11 RX ADMIN — ATORVASTATIN CALCIUM 10 MG: 10 TABLET, FILM COATED ORAL at 08:07

## 2024-09-11 RX ADMIN — FAMOTIDINE 20 MG: 20 TABLET ORAL at 08:07

## 2024-09-11 RX ADMIN — NICOTINE 1 PATCH: 7 PATCH, EXTENDED RELEASE TRANSDERMAL at 08:07

## 2024-09-11 RX ADMIN — SENNOSIDES AND DOCUSATE SODIUM 2 TABLET: 8.6; 5 TABLET ORAL at 21:12

## 2024-09-11 RX ADMIN — TRAZODONE HYDROCHLORIDE 50 MG: 50 TABLET ORAL at 21:12

## 2024-09-11 RX ADMIN — PRAZOSIN HYDROCHLORIDE 2 MG: 1 CAPSULE ORAL at 21:12

## 2024-09-11 RX ADMIN — LORAZEPAM 0.5 MG: 0.5 TABLET ORAL at 17:26

## 2024-09-11 RX ADMIN — MELATONIN TAB 3 MG 3 MG: 3 TAB at 21:12

## 2024-09-11 RX ADMIN — CARVEDILOL 6.25 MG: 6.25 TABLET, FILM COATED ORAL at 08:07

## 2024-09-11 RX ADMIN — FAMOTIDINE 20 MG: 20 TABLET ORAL at 17:26

## 2024-09-11 RX ADMIN — CARVEDILOL 6.25 MG: 6.25 TABLET, FILM COATED ORAL at 17:26

## 2024-09-11 RX ADMIN — Medication 15 ML: at 08:07

## 2024-09-11 RX ADMIN — POLYETHYLENE GLYCOL 3350 17 G: 17 POWDER, FOR SOLUTION ORAL at 08:07

## 2024-09-11 RX ADMIN — LORAZEPAM 0.5 MG: 0.5 TABLET ORAL at 08:07

## 2024-09-11 RX ADMIN — FLUOXETINE 120 MG: 20 SOLUTION ORAL at 08:07

## 2024-09-11 RX ADMIN — FLUTICASONE FUROATE 1 PUFF: 100 POWDER RESPIRATORY (INHALATION) at 08:08

## 2024-09-11 NOTE — NURSING NOTE
"Patient reports that she slept well. \"I thought you were poisoning me but they helped, can I get all those pills each night.\" Will continue to monitor.  "

## 2024-09-11 NOTE — NURSING NOTE
Meli was laying in bed for most of the evening after dinner. She was a bit suspicious initially on approach and her speech was a bit bizarre but later when I came in her room she was more grounded and she made more sense.She was med compliant, calm and cooperative

## 2024-09-11 NOTE — PROGRESS NOTES
09/11/24 0752   Team Meeting   Meeting Type Daily Rounds   Initial Conference Date 09/11/24   Next Conference Date 09/12/24   Team Members Present   Team Members Present Physician;Nurse;;   Physician Team Member Dr Banuelos, JAIR Romero   Nursing Team Member Dona   Care Management Team Member Anum   Social Work Team Member Ayse   Patient/Family Present   Patient Present No   Patient's Family Present No     Discharge pending CRR, paranoid, anxious, bizarre, minipress last night, slept, focused on another patient, not wanting a group home, 9/16 Clozaril level.

## 2024-09-11 NOTE — PLAN OF CARE
Pt did not attend any groups when prompted or offered.   Problem: Risk for Self Injury/Neglect  Goal: Attend and participate in unit activities, including therapeutic, recreational, and educational groups  Description: Interventions:  - Provide therapeutic and educational activities daily, encourage attendance and participation, and document same in the medical record  - Obtain collateral information, encourage visitation and family involvement in care   Outcome: Not Progressing

## 2024-09-11 NOTE — NURSING NOTE
"Meli reports that she is doing well and thinks that she can go home. Meli is out of room occasionally but isolates often to her room. Meli denies hallucinations, anxiety or depression. Patient however remains with need for frequent reassurance and is paranoid. Patient took HS meds but complained \"it is too many.\" Mouth check done and patient took all but took much effort. Continue to provide emotional support. Will continue to monitor and support during treatment.  "

## 2024-09-11 NOTE — CASE MANAGEMENT
CM met wit pt, reviewed CRR referral and benefits of CRR placement. Pt declined to discuss and reports not wanting to sign any releases at this time; pt reports being too tired and not needing a group home.

## 2024-09-11 NOTE — PLAN OF CARE
Problem: Prexisting or High Potential for Compromised Skin Integrity  Goal: Skin integrity is maintained or improved  Description: INTERVENTIONS:  - Identify patients at risk for skin breakdown  - Assess and monitor skin integrity  - Assess and monitor nutrition and hydration status  - Monitor labs   - Assess for incontinence   - Turn and reposition patient  - Assist with mobility/ambulation  - Relieve pressure over bony prominences  - Avoid friction and shearing  - Provide appropriate hygiene as needed including keeping skin clean and dry  - Evaluate need for skin moisturizer/barrier cream  - Collaborate with interdisciplinary team   - Patient/family teaching  - Consider wound care consult   Outcome: Progressing     Problem: Alteration in Thoughts and Perception  Goal: Verbalize thoughts and feelings  Description: Interventions:  - Promote a nonjudgmental and trusting relationship with the patient through active listening and therapeutic communication  - Assess patient's level of functioning, behavior and potential for risk  - Engage patient in 1 on 1 interactions  - Encourage patient to express fears, feelings, frustrations, and discuss symptoms    - [place] patient to reality, help patient recognize reality-based thinking   - Administer medications as ordered and assess for potential side effects  - Provide the patient education related to the signs and symptoms of the illness and desired effects of prescribed medications  Outcome: Progressing  Goal: Agree to be compliant with medication regime, as prescribed and report medication side effects  Description: Interventions:  - Offer appropriate PRN medication and supervise ingestion; conduct AIMS, as needed   Outcome: Progressing  Goal: Complete daily ADLs, including personal hygiene independently, as able  Description: Interventions:  - Observe, teach, and assist patient with ADLS  - Monitor and promote a balance of rest/activity, with adequate nutrition and  elimination   Outcome: Progressing     Problem: Ineffective Coping  Goal: Free from restraint events  Description: - Utilize least restrictive measures   - Provide behavioral interventions   - Redirect inappropriate behaviors   Outcome: Progressing     Problem: Risk for Self Injury/Neglect  Goal: Refrain from harming self  Description: Interventions:  - Monitor patient closely, per order  - Develop a trusting relationship  - Supervise medication ingestion, monitor effects and side effects   Outcome: Progressing     Problem: Depression  Goal: Treatment Goal: Demonstrate behavioral control of depressive symptoms, verbalize feelings of improved mood/affect, and adopt new coping skills prior to discharge  Outcome: Progressing  Goal: Refrain from isolation  Description: Interventions:  - Develop a trusting relationship   - Encourage socialization   Outcome: Progressing     Problem: Anxiety  Goal: Anxiety is at manageable level  Description: Interventions:  - Assess and monitor patient's anxiety level.   - Monitor for signs and symptoms (heart palpitations, chest pain, shortness of breath, headaches, nausea, feeling jumpy, restlessness, irritable, apprehensive).   - Collaborate with interdisciplinary team and initiate plan and interventions as ordered.  - [place] patient to unit/surroundings  - Explain treatment plan  - Encourage participation in care  - Encourage verbalization of concerns/fears  - Identify coping mechanisms  - Assist in developing anxiety-reducing skills  - Administer/offer alternative therapies  - Limit or eliminate stimulants  Outcome: Progressing     Problem: Potential for Falls  Goal: Patient will remain free of falls  Description: INTERVENTIONS:  - Educate patient on patient safety including physical limitations  - Instruct patient to call for assistance with activity   - Consult OT/PT to assist with strengthening/mobility   - Keep Call bell within reach  - Keep bed low and locked with side rails  adjusted as appropriate  - Keep care items and personal belongings within reach  - Initiate and maintain comfort rounds  - Offer Toileting every 2 Hours, in advance of need  - Initiate/Maintain bed and chair alarm  - Obtain necessary fall risk management equipment: walker, wheelchair  - Apply yellow socks and bracelet for high fall risk patients  - Patient moved to room near nurses station  Outcome: Progressing

## 2024-09-11 NOTE — NURSING NOTE
"Patient visible and social. Patient is med compliant stating \"whatever I'm supposed to get give to me\". Patients appetite intact with 100% breakfast eaten. Denies psych s/s. VSS. Continual safety checks ongoing  "

## 2024-09-11 NOTE — PROGRESS NOTES
Progress Note - Behavioral Health   Name: Meli Nowak 59 y.o. female I MRN: [id number]  Unit/Bed#: [facility] 651-01 I Date of Admission: 7/23/2024   Date of Service: 9/11/2024 I Hospital Day: 50     Assessment & Plan  Schizoaffective disorder, bipolar type (HCC)  Progress towards goals  Awaiting CRR placement  Continue medications as prescribed  Clozaril 600 mg nightly for schizoaffective disorder  Prozac 120 mg daily for depressive symptoms   Ativan 0.5 mg twice daily for anxiety and paranoia  Melatonin 3 mg nightly for insomnia  Prazosin 2 mg nightly for PTSD associated nightmares   trazodone 50 mg nightly for insomnia      Plan   Progress Toward Goals:   Meli is progressing towards goals of inpatient psychiatric treatment by continued medication compliance and is attending therapeutic modalities on the milieu. However, the patient continues to require inpatient psychiatric hospitalization for continued medication management and titration to optimize symptom reduction, improve sleep hygiene, and demonstrate adequate self-care.    Recommended Treatment: Treatment plan and medication changes discussed and per the attending physician the plan is:    1.Continue with group therapy, milieu therapy and occupational therapy  2.Behavioral Health checks every 7 minutes  3.Continue frequent safety checks and vitals per unit protocol  4.Continue with SLIM medical management as indicated  5.Continue with current medication regimen  6.Will review labs in the a.m.  7.Disposition Planning: Discharge planning and efforts remain ongoing    Behavioral Health Medications: all current active meds have been reviewed and continue current psychiatric medications.  Current Facility-Administered Medications   Medication Dose Route Frequency Provider Last Rate    acetaminophen  650 mg Oral Q4H PRN JOSE ELIAS Figueroa      acetaminophen  650 mg Oral Q4H PRN JOSE ELIAS Figueroa      acetaminophen  975 mg Oral Q6H PRN JOSE ELIAS Figueroa       aluminum-magnesium hydroxide-simethicone  30 mL Oral Q4H PRN Parris Bolanos, CRNP      Artificial Tears  1 drop Both Eyes Q6H PRN Dereje Banuelos MD      atorvastatin  10 mg Oral Daily Marie Ziegler, CRNP      bisacodyl  10 mg Rectal Daily PRN Marie Ziegler, CRNP      carvedilol  6.25 mg Oral BID With Meals Marie Ziegler, CRNP      cloZAPine  600 mg Oral HS Marie Ziegler, CRNP      cyanocobalamin  1,000 mcg Oral Daily Marie Ziegler, CRNP      famotidine  20 mg Oral BID Shan Fitzgerald, DO      FLUoxetine  120 mg Oral Daily Marie Ziegler, CRNP      fluticasone  1 puff Inhalation Daily Marie Ziegler, CRNP      hydrOXYzine HCL  25 mg Oral Q6H PRN Max 4/day Marie Ziegler, CRNP      hydrOXYzine HCL  50 mg Oral Q6H PRN Max 4/day Marie Ziegler, CRNP      ipratropium-albuterol  3 mL Nebulization Q4H PRN Darin Lawton MD      LORazepam  0.5 mg Oral BID Marie Ziegler, CRNP      Or    LORazepam  0.5 mg Intramuscular BID Marie Ziegler, CRNP      LORazepam  1 mg Intramuscular Q6H PRN Max 3/day Marie Ziegler, CRNP      LORazepam  1 mg Oral Q6H PRN Max 3/day Marie Ziegler, CRNP      melatonin  3 mg Oral HS Marie Ziegler, CRNP      mirtazapine  7.5 mg Oral HS PRN Marie Ziegler, CRNP      Multivitamin  15 mL Oral Daily Marie Ziegler, CRNP      nicotine  1 patch Transdermal Daily Marie Ziegler, CRNP      OLANZapine  2.5 mg Oral Q6H PRN Dereje Banuelos MD      Or    OLANZapine  2.5 mg Intramuscular Q6H PRN Dereje Banuelos MD      OLANZapine  5 mg Oral Q6H PRN Dereje Banuelos MD      Or    OLANZapine  5 mg Intramuscular Q6H PRN Dereje Banuelos MD      ondansetron  4 mg Oral Q6H PRN Darin Lawton MD      polyethylene glycol  17 g Oral Daily PRN Marie Ziegler, CRNP      polyethylene glycol  17 g Oral Daily Kristen Logan, CRNP      prazosin  2 mg Oral HS Marie Ziegler, CRNP      senna-docusate sodium  2 tablet Oral HS Rehana Sandra Stives, PA-C      traZODone  50 mg Oral [facility]  JOSE ELIAS Ziegler         Risks / Benefits of Treatment:  Risks, benefits, and possible side effects of medications explained to patient and patient verbalizes understanding and agreement for treatment.       Subjective    Patient was seen today for continuation of care, records reviewed and patient was discussed with the morning case review team.    Meli was seen today for psychiatric follow-up.  On assessment today, Meli was less paranoid but noted to be more intrusive with peers.  Currently sitting by the phone, patient attempting to comfort peer and is noted to be trying to hug and redirect patient.  Redirection provided, patient also reeducated on unit rules and verbalized understanding.  No paranoia or [place] preoccupation verbalized during interview, patient is more preoccupied with make up and making herself more presentable.  AVH denied, sleep and appetite reported as improved.  Patient will continue with Clozaril 600 mg daily with weekly labs to be obtained on Monday including Clozaril level.  Tentative discharge ongoing pending CRR placement.    Meli denies acute suicidal/self-harm ideation/intent/plan upon direct inquiry at this time.  Meli remains behaviorally appropriate, no agitation or aggression noted on exam or in report.   Impulse control is intact.  Meli remains adherent to her current psychotropic medication regimen and denies any side effects from medications, as well as none noted on exam.    Psychiatric Review of Systems:  Behavior over the last 24 hours:  unchanged.   Sleep: improved  Appetite: good  Medication side effects: none  ROS: no complaints, denies shortness of breath or chest pain and all other systems are negative for acute changes        Objective    Vitals:  Vitals:    09/11/24 0740   BP: 151/86   Pulse: 96   Resp: 17   Temp: (!) 96.8 °F (36 °C)   SpO2: 96%       Laboratory Results:  I have personally reviewed all pertinent laboratory/tests results.    Mental Status  Evaluation:  Appearance:  disheveled, dressed in hospital attire, overweight   Behavior:  cooperative, bizarre, demanding   Speech:  normal rate and volume   Mood:  less anxious, less depressed   Affect:  constricted   Thought Process:  disorganized, illogical, perseverative, concrete   Thought Content:  Latter-day and somatic preoccupation, paranoid ideation, negative thoughts, ruminating thoughts   Perceptual Disturbances: less preoccupied   Risk Potential: Suicidal ideation - None  Homicidal ideation - None  Potential for aggression - No   Memory:  recent and remote memory grossly intact   Sensorium  person, place, and time/date      Consciousness:  alert and awake   Attention: attention span and concentration are age appropriate   Insight:  limited   Judgment: limited   Gait/Station: normal gait/station   Motor Activity: no abnormal movements       Counseling / Coordination of Care:  Patient's progress reviewed with nursing staff.  Medications, treatment progress and treatment plan reviewed with patient.  Supportive counseling provided to the patient.    This note was completed in part utilizing Dragon dictation Software. Grammatical, translation, syntax errors, random word insertions, spelling mistakes, and incomplete sentences may be an occasional consequence of this system secondary to software limitations with voice recognition, ambient noise, and hardware issues. If you have any questions or concerns about the content, text, or information contained within the body of this dictation, please contact the provider for clarification

## 2024-09-12 LAB — GLUCOSE SERPL-MCNC: 106 MG/DL (ref 65–140)

## 2024-09-12 PROCEDURE — 82948 REAGENT STRIP/BLOOD GLUCOSE: CPT

## 2024-09-12 PROCEDURE — 99232 SBSQ HOSP IP/OBS MODERATE 35: CPT | Performed by: STUDENT IN AN ORGANIZED HEALTH CARE EDUCATION/TRAINING PROGRAM

## 2024-09-12 RX ADMIN — Medication 15 ML: at 08:07

## 2024-09-12 RX ADMIN — PRAZOSIN HYDROCHLORIDE 2 MG: 1 CAPSULE ORAL at 21:33

## 2024-09-12 RX ADMIN — TRAZODONE HYDROCHLORIDE 50 MG: 50 TABLET ORAL at 21:34

## 2024-09-12 RX ADMIN — ACETAMINOPHEN 975 MG: 325 TABLET, FILM COATED ORAL at 19:51

## 2024-09-12 RX ADMIN — NICOTINE 1 PATCH: 7 PATCH, EXTENDED RELEASE TRANSDERMAL at 08:09

## 2024-09-12 RX ADMIN — ALUMINUM HYDROXIDE, MAGNESIUM HYDROXIDE, AND DIMETHICONE 30 ML: 200; 20; 200 SUSPENSION ORAL at 20:03

## 2024-09-12 RX ADMIN — POLYETHYLENE GLYCOL 3350 17 G: 17 POWDER, FOR SOLUTION ORAL at 08:47

## 2024-09-12 RX ADMIN — FAMOTIDINE 20 MG: 20 TABLET ORAL at 17:00

## 2024-09-12 RX ADMIN — CLOZAPINE 600 MG: 200 TABLET ORAL at 21:33

## 2024-09-12 RX ADMIN — FAMOTIDINE 20 MG: 20 TABLET ORAL at 08:07

## 2024-09-12 RX ADMIN — FLUTICASONE FUROATE 1 PUFF: 100 POWDER RESPIRATORY (INHALATION) at 08:07

## 2024-09-12 RX ADMIN — CARVEDILOL 6.25 MG: 6.25 TABLET, FILM COATED ORAL at 08:07

## 2024-09-12 RX ADMIN — CYANOCOBALAMIN TAB 1000 MCG 1000 MCG: 1000 TAB at 08:07

## 2024-09-12 RX ADMIN — CARVEDILOL 6.25 MG: 6.25 TABLET, FILM COATED ORAL at 16:57

## 2024-09-12 RX ADMIN — FLUOXETINE 120 MG: 20 SOLUTION ORAL at 08:07

## 2024-09-12 RX ADMIN — LORAZEPAM 0.5 MG: 0.5 TABLET ORAL at 08:07

## 2024-09-12 RX ADMIN — ATORVASTATIN CALCIUM 10 MG: 10 TABLET, FILM COATED ORAL at 08:07

## 2024-09-12 RX ADMIN — MELATONIN TAB 3 MG 3 MG: 3 TAB at 21:33

## 2024-09-12 RX ADMIN — SENNOSIDES AND DOCUSATE SODIUM 2 TABLET: 8.6; 5 TABLET ORAL at 21:34

## 2024-09-12 RX ADMIN — LORAZEPAM 0.5 MG: 0.5 TABLET ORAL at 17:00

## 2024-09-12 NOTE — PROGRESS NOTES
09/12/24 0812   Team Meeting   Meeting Type Daily Rounds   Initial Conference Date 09/12/24   Next Conference Date 09/13/24   Team Members Present   Team Members Present Physician;Nurse;;   Physician Team Member JAIR Ponce J. Stives   Nursing Team Member Dona   Care Management Team Member Anum   Social Work Team Member Ayse   Patient/Family Present   Patient Present No   Patient's Family Present No     Clozaril level 9/16, had a visit ICM yesterday, discharge pending CRR.

## 2024-09-12 NOTE — NURSING NOTE
"Post Fall Note    No data recorded    Brief Description of Events  Patient ran from room across hallway to nutrition room prompted by seeing staff member enter nutrition room to get water. While sprinting across hallway, patient slipped into doorway banging knees and elbows against floor.    Last Recorded Vitals  Blood pressure (!) 183/83, pulse 96, temperature 98.4 °F (36.9 °C), temperature source Temporal, resp. rate 20, height 5' 7\" (1.702 m), weight 94.2 kg (207 lb 10.8 oz), SpO2 91%.      Principal Problem:    Schizoaffective disorder, bipolar type (HCC)  Active Problems:    Medical clearance for psychiatric admission    Type 2 diabetes mellitus (HCC)    Obesity    Tobacco abuse    Hyperlipidemia    Esophageal reflux    Essential (primary) hypertension    Vitamin B12 deficiency    Hypoxia    Ambulatory dysfunction    Mild protein-calorie malnutrition (HCC)         "

## 2024-09-12 NOTE — NURSING NOTE
"Patient visible and social with select peers. Patient denies psych s/s. Patient asked writer \"am I safe here? Is it safe to drink the water? Did someone come in my room last night?\" Patient easily redirected. Patient med compliant. Appetite intact with 100% breakfast eaten. VSS. Continual safety checks ongoing  "

## 2024-09-12 NOTE — PROGRESS NOTES
Progress Note - Behavioral Health   Name: Meli Nowak 59 y.o. female I MRN: [id number]  Unit/Bed#: [facility] 651-01 I Date of Admission: 7/23/2024   Date of Service: 9/12/2024 I Hospital Day: 51     Assessment & Plan  Schizoaffective disorder, bipolar type (HCC)  Progress towards goals  Awaiting CRR placement  Continue medications as prescribed  Clozaril 600 mg nightly for schizoaffective disorder  Prozac 120 mg daily for depressive symptoms   Ativan 0.5 mg twice daily for anxiety and paranoia  Melatonin 3 mg nightly for insomnia  Prazosin 2 mg nightly for PTSD associated nightmares   trazodone 50 mg nightly for insomnia      Plan   Progress Toward Goals:   Meli is progressing towards goals of inpatient psychiatric treatment by continued medication compliance and is attending therapeutic modalities on the milieu. However, the patient continues to require inpatient psychiatric hospitalization for continued medication management and titration to optimize symptom reduction, improve sleep hygiene, and demonstrate adequate self-care.    Recommended Treatment: Treatment plan and medication changes discussed and per the attending physician the plan is:    1.Continue with group therapy, milieu therapy and occupational therapy  2.Behavioral Health checks every 7 minutes  3.Continue frequent safety checks and vitals per unit protocol  4.Continue with SLIM medical management as indicated  5.Continue with current medication regimen  6.Will review labs in the a.m.  7.Disposition Planning: Discharge planning and efforts remain ongoing    Behavioral Health Medications: all current active meds have been reviewed and continue current psychiatric medications.  Current Facility-Administered Medications   Medication Dose Route Frequency Provider Last Rate    acetaminophen  650 mg Oral Q4H PRN JOSE ELIAS Figueroa      acetaminophen  650 mg Oral Q4H PRN JOSE ELIAS Figueroa      acetaminophen  975 mg Oral Q6H PRN JOSE ELIAS Figueroa       aluminum-magnesium hydroxide-simethicone  30 mL Oral Q4H PRN Parris Bolanos, CRNP      Artificial Tears  1 drop Both Eyes Q6H PRN Dereje Banuelos MD      atorvastatin  10 mg Oral Daily Marie Ziegler, CRNP      bisacodyl  10 mg Rectal Daily PRN Marie Ziegler, CRNP      carvedilol  6.25 mg Oral BID With Meals Maire Ziegler, CRNP      cloZAPine  600 mg Oral HS Marie Ziegler, CRNP      cyanocobalamin  1,000 mcg Oral Daily Marie Ziegler, CRNP      famotidine  20 mg Oral BID Shan Fitzgerald, DO      FLUoxetine  120 mg Oral Daily Marie Ziegler, CRNP      fluticasone  1 puff Inhalation Daily Marie Ziegler, CRNP      hydrOXYzine HCL  25 mg Oral Q6H PRN Max 4/day Marie Ziegler, CRNP      hydrOXYzine HCL  50 mg Oral Q6H PRN Max 4/day Marie Ziegler, CRNP      ipratropium-albuterol  3 mL Nebulization Q4H PRN Darin Lawton MD      LORazepam  0.5 mg Oral BID Marie Ziegler, CRNP      Or    LORazepam  0.5 mg Intramuscular BID Marie Ziegler, CRNP      LORazepam  1 mg Intramuscular Q6H PRN Max 3/day Marie Ziegler, CRNP      LORazepam  1 mg Oral Q6H PRN Max 3/day Marie Ziegler, CRNP      melatonin  3 mg Oral HS Marie Ziegler, CRNP      mirtazapine  7.5 mg Oral HS PRN Marie Ziegler, CRNP      Multivitamin  15 mL Oral Daily Marie Ziegler, CRNP      nicotine  1 patch Transdermal Daily Marie Ziegler, CRNP      OLANZapine  2.5 mg Oral Q6H PRN Dereje Banuelos MD      Or    OLANZapine  2.5 mg Intramuscular Q6H PRN Dereje Banuelos MD      OLANZapine  5 mg Oral Q6H PRN Dereje Banuelos MD      Or    OLANZapine  5 mg Intramuscular Q6H PRN Dereje Banuelos MD      ondansetron  4 mg Oral Q6H PRN Darin Lawton MD      polyethylene glycol  17 g Oral Daily PRN Marie Ziegler, CRNP      polyethylene glycol  17 g Oral Daily Kristen Logan, CRNP      prazosin  2 mg Oral HS Marie Ziegler, CRNP      senna-docusate sodium  2 tablet Oral HS Rehana Sandra Stives, PA-C      traZODone  50 mg Oral [facility]  JOSE ELIAS Ziegler         Risks / Benefits of Treatment:  Risks, benefits, and possible side effects of medications explained to patient and patient verbalizes understanding and agreement for treatment.       Subjective    Patient was seen today for continuation of care, records reviewed and patient was discussed with the morning case review team.    Meli was seen today for psychiatric follow-up.  On assessment today, Meli was visible and active on unit.  Less paranoid, patient is preoccupied with make-up today.  ICM able to visit patient yesterday, patient states that she hopes she will bring her items soon. Clozaril recently up titrated to 600 mg daily for paranoia, [employer] preoccupation and auditory hallucinations. Patient does not endorse any symptoms , although she is still noted to be internally preoccupied at times. Sleep and appetite improved. Will obtain weekly labs this following Monday for continued drug therapy including Clozaril level. No changes to be made at this time. Tentative discharge ongoing pending CRR placement. .     Meli denies acute suicidal/self-harm ideation/intent/plan upon direct inquiry at this time.  Meli remains behaviorally appropriate, no agitation or aggression noted on exam or in report.   Impulse control is intact.  Meli remains adherent to her current psychotropic medication regimen and denies any side effects from medications, as well as none noted on exam.    Psychiatric Review of Systems:  Behavior over the last 24 hours:  unchanged.   Sleep: improved  Appetite: good  Medication side effects: none  ROS: no complaints, denies shortness of breath or chest pain and all other systems are negative for acute changes        Objective    Vitals:  Vitals:    09/12/24 0722   BP: 149/69   Pulse: 97   Resp: 17   Temp: (!) 97.3 °F (36.3 °C)   SpO2: 95%       Laboratory Results:  I have personally reviewed all pertinent laboratory/tests results.    Mental Status Evaluation:  Appearance:   improved grooming   Behavior:  cooperative, calm, less guarded   Speech:  normal rate and volume   Mood:  less anxious, less depressed   Affect:  constricted   Thought Process:  disorganized, illogical, perseverative, concrete   Thought Content:  paranoid ideation   Perceptual Disturbances: appears preoccupied, talks to self at times   Risk Potential: Suicidal ideation - None  Homicidal ideation - None  Potential for aggression - No   Memory:  recent and remote memory grossly intact   Sensorium  person, place, and time/date      Consciousness:  alert and awake   Attention: attention span and concentration are age appropriate   Insight:  limited   Judgment: limited   Gait/Station: normal gait/station   Motor Activity: no abnormal movements       Counseling / Coordination of Care:  Patient's progress reviewed with nursing staff.  Medications, treatment progress and treatment plan reviewed with patient.  Supportive counseling provided to the patient.    This note was completed in part utilizing Dragon dictation Software. Grammatical, translation, syntax errors, random word insertions, spelling mistakes, and incomplete sentences may be an occasional consequence of this system secondary to software limitations with voice recognition, ambient noise, and hardware issues. If you have any questions or concerns about the content, text, or information contained within the body of this dictation, please contact the provider for clarification

## 2024-09-12 NOTE — PROGRESS NOTES
09/12/24 1330   Activity/Group Checklist   Group Life Skills  (self awareness)   Attendance Attended   Attendance Duration (min) 46-60   Interactions Other (Comment)  (Pt, initially upset that group had not started right on time.Eventually pt.calmed and apologized to  for her negative comments.)   Affect/Mood Wide   Goals Achieved Able to engage in interactions;Able to listen to others;Identified feelings;Discussed coping strategies;Able to self-disclose

## 2024-09-12 NOTE — TREATMENT TEAM
Pt attends group.  Pt needs prompts for focus,  Tangential and requesting music for group and to do other things than group topic.  Pt needs redirection for cross talk with peers and rules. Pt does raise hand at times with questions.      09/12/24 1000   Activity/Group Checklist   Group Other (Comment)  (Anxiety symptoms: phyisical, emotoinal, thought and behaviors)   Attendance Attended   Attendance Duration (min) 31-45   Interactions Other (Comment)  (tangential)   Affect/Mood Wide   Goals Achieved Identified feelings;Identified triggers;Able to listen to others;Able to engage in interactions;Able to reflect/comment on own behavior;Able to manage/cope with feelings;Verbalized increased hopefulness;Able to self-disclose;Able to recieve feedback

## 2024-09-12 NOTE — TREATMENT TEAM
09/12/24 1600   Provider Notification   Reason for Communication Review case   Provider Name Kristen Logan   Provider Role Hospitalist   Method of Communication Other (Comment)   Response See orders   Notification Time 1600   Shift Event Fall   Post Fall Interventions Circumstances of fall reviewed and documented;Fall risk precautions implemented/maitained;Comforts rounds continued     Provider notified of patient fall. No further orders at this time

## 2024-09-12 NOTE — CASE MANAGEMENT
Call made to pt's ICM worker Conchita Poncess, tel# [phone]. Update provided regarding plan for capacity eval and pt refusing to sign for CRR referrals. Conchita reports speaking with pt and pt reported to Conchita of plans to remain in hospital and pt refused [facility] and [facility] referrals when discussed with Conchita.

## 2024-09-13 LAB
BASOPHILS # BLD AUTO: 0.07 THOUSANDS/ΜL (ref 0–0.1)
BASOPHILS NFR BLD AUTO: 1 % (ref 0–1)
EOSINOPHIL # BLD AUTO: 0 THOUSAND/ΜL (ref 0–0.61)
EOSINOPHIL NFR BLD AUTO: 0 % (ref 0–6)
ERYTHROCYTE [DISTWIDTH] IN BLOOD BY AUTOMATED COUNT: 15.4 % (ref 11.6–15.1)
HCT VFR BLD AUTO: 40.7 % (ref 34.8–46.1)
HGB BLD-MCNC: 12.5 G/DL (ref 11.5–15.4)
IMM GRANULOCYTES # BLD AUTO: 0.02 THOUSAND/UL (ref 0–0.2)
IMM GRANULOCYTES NFR BLD AUTO: 0 % (ref 0–2)
LYMPHOCYTES # BLD AUTO: 2.98 THOUSANDS/ΜL (ref 0.6–4.47)
LYMPHOCYTES NFR BLD AUTO: 36 % (ref 14–44)
MCH RBC QN AUTO: 29.5 PG (ref 26.8–34.3)
MCHC RBC AUTO-ENTMCNC: 30.7 G/DL (ref 31.4–37.4)
MCV RBC AUTO: 96 FL (ref 82–98)
MONOCYTES # BLD AUTO: 0.99 THOUSAND/ΜL (ref 0.17–1.22)
MONOCYTES NFR BLD AUTO: 12 % (ref 4–12)
NEUTROPHILS # BLD AUTO: 4.2 THOUSANDS/ΜL (ref 1.85–7.62)
NEUTS SEG NFR BLD AUTO: 51 % (ref 43–75)
NRBC BLD AUTO-RTO: 0 /100 WBCS
PLATELET # BLD AUTO: 261 THOUSANDS/UL (ref 149–390)
PMV BLD AUTO: 9.4 FL (ref 8.9–12.7)
RBC # BLD AUTO: 4.24 MILLION/UL (ref 3.81–5.12)
WBC # BLD AUTO: 8.26 THOUSAND/UL (ref 4.31–10.16)

## 2024-09-13 PROCEDURE — 99232 SBSQ HOSP IP/OBS MODERATE 35: CPT | Performed by: STUDENT IN AN ORGANIZED HEALTH CARE EDUCATION/TRAINING PROGRAM

## 2024-09-13 PROCEDURE — 85025 COMPLETE CBC W/AUTO DIFF WBC: CPT | Performed by: NURSE PRACTITIONER

## 2024-09-13 RX ADMIN — PRAZOSIN HYDROCHLORIDE 2 MG: 1 CAPSULE ORAL at 21:18

## 2024-09-13 RX ADMIN — Medication 15 ML: at 08:12

## 2024-09-13 RX ADMIN — POLYETHYLENE GLYCOL 3350 17 G: 17 POWDER, FOR SOLUTION ORAL at 08:13

## 2024-09-13 RX ADMIN — LORAZEPAM 0.5 MG: 0.5 TABLET ORAL at 08:12

## 2024-09-13 RX ADMIN — MELATONIN TAB 3 MG 3 MG: 3 TAB at 21:18

## 2024-09-13 RX ADMIN — ATORVASTATIN CALCIUM 10 MG: 10 TABLET, FILM COATED ORAL at 08:12

## 2024-09-13 RX ADMIN — FAMOTIDINE 20 MG: 20 TABLET ORAL at 08:13

## 2024-09-13 RX ADMIN — CYANOCOBALAMIN TAB 1000 MCG 1000 MCG: 1000 TAB at 08:12

## 2024-09-13 RX ADMIN — SENNOSIDES AND DOCUSATE SODIUM 2 TABLET: 8.6; 5 TABLET ORAL at 21:18

## 2024-09-13 RX ADMIN — FLUTICASONE FUROATE 1 PUFF: 100 POWDER RESPIRATORY (INHALATION) at 08:13

## 2024-09-13 RX ADMIN — FAMOTIDINE 20 MG: 20 TABLET ORAL at 17:07

## 2024-09-13 RX ADMIN — NICOTINE 1 PATCH: 7 PATCH, EXTENDED RELEASE TRANSDERMAL at 08:13

## 2024-09-13 RX ADMIN — TRAZODONE HYDROCHLORIDE 50 MG: 50 TABLET ORAL at 21:18

## 2024-09-13 RX ADMIN — LORAZEPAM 0.5 MG: 0.5 TABLET ORAL at 17:07

## 2024-09-13 RX ADMIN — FLUOXETINE 120 MG: 20 SOLUTION ORAL at 08:12

## 2024-09-13 RX ADMIN — CLOZAPINE 600 MG: 200 TABLET ORAL at 21:18

## 2024-09-13 RX ADMIN — CARVEDILOL 6.25 MG: 6.25 TABLET, FILM COATED ORAL at 17:07

## 2024-09-13 RX ADMIN — CARVEDILOL 6.25 MG: 6.25 TABLET, FILM COATED ORAL at 08:12

## 2024-09-13 NOTE — PROGRESS NOTES
Progress Note - Behavioral Health   Name: Meli Nowak 59 y.o. female I MRN: [id number]  Unit/Bed#: [facility] 651-01 I Date of Admission: 7/23/2024   Date of Service: 9/13/2024 I Hospital Day: 52     Assessment & Plan  Schizoaffective disorder, bipolar type (HCC)  Progress towards goals  Awaiting CRR placement  Continue medications as prescribed  Clozaril 600 mg nightly for schizoaffective disorder  Prozac 120 mg daily for depressive symptoms   Ativan 0.5 mg twice daily for anxiety and paranoia  Melatonin 3 mg nightly for insomnia  Prazosin 2 mg nightly for PTSD associated nightmares   trazodone 50 mg nightly for insomnia      Plan   Progress Toward Goals:   Meli is progressing towards goals of inpatient psychiatric treatment by continued medication compliance and is attending therapeutic modalities on the milieu. However, the patient continues to require inpatient psychiatric hospitalization for continued medication management and titration to optimize symptom reduction, improve sleep hygiene, and demonstrate adequate self-care.    Recommended Treatment: Treatment plan and medication changes discussed and per the attending physician the plan is:    1.Continue with group therapy, milieu therapy and occupational therapy  2.Behavioral Health checks every 7 minutes  3.Continue frequent safety checks and vitals per unit protocol  4.Continue with SLIM medical management as indicated  5.Continue with current medication regimen  6.Will review labs in the a.m.  7.Disposition Planning: Discharge planning and efforts remain ongoing    Behavioral Health Medications: all current active meds have been reviewed and continue current psychiatric medications.  Current Facility-Administered Medications   Medication Dose Route Frequency Provider Last Rate    acetaminophen  650 mg Oral Q4H PRN JOSE ELIAS Figueroa      acetaminophen  650 mg Oral Q4H PRN JOSE ELIAS Figueroa      acetaminophen  975 mg Oral Q6H PRN JOSE ELIAS Figueroa       aluminum-magnesium hydroxide-simethicone  30 mL Oral Q4H PRN Parris Bolanos, CRNP      Artificial Tears  1 drop Both Eyes Q6H PRN Dereje Banuelos MD      atorvastatin  10 mg Oral Daily Marie Ziegler, CRNP      bisacodyl  10 mg Rectal Daily PRN Marie Ziegler, CRNP      carvedilol  6.25 mg Oral BID With Meals Marie Ziegler, CRNP      cloZAPine  600 mg Oral HS Marie Ziegler, CRNP      cyanocobalamin  1,000 mcg Oral Daily Marie Ziegler, CRNP      famotidine  20 mg Oral BID Shan Fitzgerald, DO      FLUoxetine  120 mg Oral Daily Marie Ziegler, CRNP      fluticasone  1 puff Inhalation Daily Marie Ziegler, CRNP      hydrOXYzine HCL  25 mg Oral Q6H PRN Max 4/day Marie Ziegler, CRNP      hydrOXYzine HCL  50 mg Oral Q6H PRN Max 4/day Marie Ziegler, CRNP      ipratropium-albuterol  3 mL Nebulization Q4H PRN Darin Lawton MD      LORazepam  0.5 mg Oral BID Marie Ziegler, CRNP      Or    LORazepam  0.5 mg Intramuscular BID Marie Ziegler, CRNP      LORazepam  1 mg Intramuscular Q6H PRN Max 3/day Marie Ziegler, CRNP      LORazepam  1 mg Oral Q6H PRN Max 3/day Marie Ziegler, CRNP      melatonin  3 mg Oral HS Marie Ziegler, CRNP      mirtazapine  7.5 mg Oral HS PRN Marie Ziegler, CRNP      Multivitamin  15 mL Oral Daily Marie Ziegler, CRNP      nicotine  1 patch Transdermal Daily Marie Ziegler, CRNP      OLANZapine  2.5 mg Oral Q6H PRN Dereje Banuelos MD      Or    OLANZapine  2.5 mg Intramuscular Q6H PRN Dereje Banuelos MD      OLANZapine  5 mg Oral Q6H PRN Dereje Banuelos MD      Or    OLANZapine  5 mg Intramuscular Q6H PRN Dereje Banuelos MD      ondansetron  4 mg Oral Q6H PRN Darin Lawton MD      polyethylene glycol  17 g Oral Daily PRN Marie Ziegler, CRNP      polyethylene glycol  17 g Oral Daily Kristen Logan, CRNP      prazosin  2 mg Oral HS Marie Ziegler, CRNP      senna-docusate sodium  2 tablet Oral HS Rehana Sandra Stives, PA-C      traZODone  50 mg Oral [facility]  "JOSE ELIAS Ziegler         Risks / Benefits of Treatment:  Risks, benefits, and possible side effects of medications explained to patient and patient verbalizes understanding and agreement for treatment.       Subjective    Patient was seen today for continuation of care, records reviewed and patient was discussed with the morning case review team.    Meli was seen today for psychiatric follow-up.  On assessment today, Meli was calm and cooperative.  Still paranoid, patient is also more internally preoccupied today and is seen in the day room laughing uncontrollably.  When questioned about laughter, Meli states \" I can't tell you what they said, but it was very funny\".  Sleep and appetite reported as improved.  Clozaril currently prescribed at 600 mg daily with Clozaril level and weekly labs to be obtained this Monday.  LBM: 9/12/2024.  Patient tolerating regimen well with no adverse or EPS symptoms noted.  Tentative discharge pending CRR placement    Meli denies acute suicidal/self-harm ideation/intent/plan upon direct inquiry at this time.  Meli remains behaviorally appropriate, no agitation or aggression noted on exam or in report. Impulse control is intact.  Meli remains adherent to her current psychotropic medication regimen and denies any side effects from medications, as well as none noted on exam.    Psychiatric Review of Systems:  Behavior over the last 24 hours:  unchanged.   Sleep: good  Appetite: good  Medication side effects: none  ROS: no complaints, denies shortness of breath or chest pain and all other systems are negative for acute changes        Objective    Vitals:  Vitals:    09/13/24 0746   BP: 130/81   Pulse: 89   Resp: 19   Temp: 97.8 °F (36.6 °C)   SpO2: 96%       Laboratory Results:  I have personally reviewed all pertinent laboratory/tests results.  CBC:   Lab Results   Component Value Date    WBC 8.26 09/13/2024    RBC 4.24 09/13/2024    HGB 12.5 09/13/2024    HCT 40.7 09/13/2024    MCV 96 " 09/13/2024     09/13/2024    MCH 29.5 09/13/2024    MCHC 30.7 (L) 09/13/2024    RDW 15.4 (H) 09/13/2024    MPV 9.4 09/13/2024    NRBC 0 09/13/2024    NEUTROABS 4.20 09/13/2024       Mental Status Evaluation:  Appearance:  disheveled, marginal hygiene, improved grooming   Behavior:  cooperative, bizarre, guarded   Speech:  normal rate and volume   Mood:  less anxious, less depressed   Affect:  constricted   Thought Process:  illogical, perseverative, negative thinking, concrete   Thought Content:  paranoid ideation, negative thoughts   Perceptual Disturbances: appears responding to internal stimuli, appears preoccupied, denies when asked, but talks to self at times   Risk Potential: Suicidal ideation - None  Homicidal ideation - None  Potential for aggression - No   Memory:  recent and remote memory grossly intact   Sensorium  person, place, and time/date      Consciousness:  alert and awake   Attention: attention span and concentration are age appropriate   Insight:  limited   Judgment: limited   Gait/Station: normal gait/station   Motor Activity: no abnormal movements       Counseling / Coordination of Care:  Patient's progress reviewed with nursing staff.  Medications, treatment progress and treatment plan reviewed with patient.  Supportive counseling provided to the patient.    This note was completed in part utilizing Dragon dictation Software. Grammatical, translation, syntax errors, random word insertions, spelling mistakes, and incomplete sentences may be an occasional consequence of this system secondary to software limitations with voice recognition, ambient noise, and hardware issues. If you have any questions or concerns about the content, text, or information contained within the body of this dictation, please contact the provider for clarification

## 2024-09-13 NOTE — PLAN OF CARE
Problem: DISCHARGE PLANNING - CARE MANAGEMENT  Goal: Discharge to post-acute care or home with appropriate resources  Description: INTERVENTIONS:  - Conduct assessment to determine patient/family and health care team treatment goals, and need for post-acute services based on payer coverage, community resources, and patient preferences, and barriers to discharge  - Address psychosocial, clinical, and financial barriers to discharge as identified in assessment in conjunction with the patient/family and health care team  - Arrange appropriate level of post-acute services according to patient’s   needs and preference and payer coverage in collaboration with the physician and health care team  - Communicate with and update the patient/family, physician, and health care team regarding progress on the discharge plan  - Arrange appropriate transportation to post-acute venues  Outcome: Progressing     Problem: Prexisting or High Potential for Compromised Skin Integrity  Goal: Skin integrity is maintained or improved  Description: INTERVENTIONS:  - Identify patients at risk for skin breakdown  - Assess and monitor skin integrity  - Assess and monitor nutrition and hydration status  - Monitor labs   - Assess for incontinence   - Turn and reposition patient  - Assist with mobility/ambulation  - Relieve pressure over bony prominences  - Avoid friction and shearing  - Provide appropriate hygiene as needed including keeping skin clean and dry  - Evaluate need for skin moisturizer/barrier cream  - Collaborate with interdisciplinary team   - Patient/family teaching  - Consider wound care consult   Outcome: Progressing     Problem: Alteration in Thoughts and Perception  Goal: Verbalize thoughts and feelings  Description: Interventions:  - Promote a nonjudgmental and trusting relationship with the patient through active listening and therapeutic communication  - Assess patient's level of functioning, behavior and potential for  risk  - Engage patient in 1 on 1 interactions  - Encourage patient to express fears, feelings, frustrations, and discuss symptoms    - [place] patient to reality, help patient recognize reality-based thinking   - Administer medications as ordered and assess for potential side effects  - Provide the patient education related to the signs and symptoms of the illness and desired effects of prescribed medications  Outcome: Progressing  Goal: Refrain from acting on delusional thinking/internal stimuli  Description: Interventions:  - Monitor patient closely, per order   - Utilize least restrictive measures   - Set reasonable limits, give positive feedback for acceptable   - Administer medications as ordered and monitor of potential side effects  Outcome: Progressing  Goal: Agree to be compliant with medication regime, as prescribed and report medication side effects  Description: Interventions:  - Offer appropriate PRN medication and supervise ingestion; conduct AIMS, as needed   Outcome: Progressing  Goal: Attend and participate in unit activities, including therapeutic, recreational, and educational groups  Description: Interventions:  -Encourage Visitation and family involvement in care  Outcome: Progressing     Problem: Ineffective Coping  Goal: Identifies ineffective coping skills  Outcome: Progressing  Goal: Participates in unit activities  Description: Interventions:  - Provide therapeutic environment   - Provide required programming   - Redirect inappropriate behaviors   Outcome: Progressing  Goal: Patient/Family verbalizes awareness of resources  Outcome: Progressing  Goal: Understands least restrictive measures  Description: Interventions:  - Utilize least restrictive behavior  Outcome: Progressing  Goal: Free from restraint events  Description: - Utilize least restrictive measures   - Provide behavioral interventions   - Redirect inappropriate behaviors   Outcome: Progressing     Problem: Risk for Self  Injury/Neglect  Goal: Verbalize thoughts and feelings  Description: Interventions:  - Assess and re-assess patient's lethality and potential for self-injury  - Engage patient in 1:1 interactions, daily, for a minimum of 15 minutes  - Encourage patient to express feelings, fears, frustrations, hopes  - Establish rapport/trust with patient   Outcome: Progressing  Goal: Refrain from harming self  Description: Interventions:  - Monitor patient closely, per order  - Develop a trusting relationship  - Supervise medication ingestion, monitor effects and side effects   Outcome: Progressing

## 2024-09-13 NOTE — CONSULTS
Consultation - Neuropsychology/Psychology Department  Meli Nowak 59 y.o. female MRN: [id number]  Unit/Bed#: [facility] 651-01 Encounter: [id number]        Reason for Consultation:  Meli Nowak is a 59 y.o. year old female who was referred for a Neuropsychological Exam to assess cognitive functioning and comment on capacity to make informed medical decisions.      History of Present Illness  Altered mental status    Physician Requesting Consult: Dereje Banuelos MD    PROBLEM LIST:  Patient Active Problem List   Diagnosis    Medical clearance for psychiatric admission    Type 2 diabetes mellitus (HCC)    Obesity    Psychosis (HCC)    Tobacco abuse    Hyperlipidemia    Schizoaffective disorder, bipolar type (HCC)    Altered mental status    Agoraphobia with panic disorder    Arthritis    Diastasis recti    Endometrial cancer (HCC)    Esophageal reflux    Hepatic steatosis    HSV-2 infection    Incisional hernia, without obstruction or gangrene    Incontinence of urine in female    Polycystic ovaries    Postmenopausal bleeding    Posttraumatic stress disorder    Right buttock pain    Catatonia    Essential (primary) hypertension    Vitamin B12 deficiency    Hypoxia    Ambulatory dysfunction    Mild protein-calorie malnutrition (HCC)         Historical Information   Past Medical History:   Diagnosis Date    Cognitive impairment     Diabetes mellitus (HCC)     Essential (primary) hypertension     Psychosis (HCC)      Past Surgical History:   Procedure Laterality Date     SECTION      CHOLECYSTECTOMY       Social History   Social History     Substance and Sexual Activity   Alcohol Use Not Currently    Comment: Unable to determine     Social History     Substance and Sexual Activity   Drug Use No     Social History     Tobacco Use   Smoking Status Every Day    Current packs/day: 0.50    Types: Cigarettes   Smokeless Tobacco Never     Family History: History reviewed. No pertinent family history.    Meds/Allergies    current meds:   Current Facility-Administered Medications:     acetaminophen (TYLENOL) tablet 650 mg, Q4H PRN    acetaminophen (TYLENOL) tablet 650 mg, Q4H PRN    acetaminophen (TYLENOL) tablet 975 mg, Q6H PRN    aluminum-magnesium hydroxide-simethicone (MAALOX) oral suspension 30 mL, Q4H PRN    Artificial Tears Op Soln 1 drop, Q6H PRN    atorvastatin (LIPITOR) tablet 10 mg, Daily    bisacodyl (DULCOLAX) rectal suppository 10 mg, Daily PRN    carvedilol (COREG) tablet 6.25 mg, BID With Meals    clozapine (CLOZARIL) tablet 600 mg, HS    cyanocobalamin (VITAMIN B-12) tablet 1,000 mcg, Daily    famotidine (PEPCID) tablet 20 mg, BID    FLUoxetine (PROzac) oral solution 120 mg, Daily    fluticasone (ARNUITY ELLIPTA) 100 MCG/ACT inhaler 1 puff, Daily    hydrOXYzine HCL (ATARAX) tablet 25 mg, Q6H PRN Max 4/day    hydrOXYzine HCL (ATARAX) tablet 50 mg, Q6H PRN Max 4/day    ipratropium-albuterol (DUO-NEB) 0.5-2.5 mg/3 mL inhalation solution 3 mL, Q4H PRN    LORazepam (ATIVAN) tablet 0.5 mg, BID **OR** LORazepam (ATIVAN) injection 0.5 mg, BID    LORazepam (ATIVAN) injection 1 mg, Q6H PRN Max 3/day    LORazepam (ATIVAN) tablet 1 mg, Q6H PRN Max 3/day    melatonin tablet 3 mg, HS    mirtazapine (REMERON) tablet 7.5 mg, HS PRN    Multivitamin liquid 15 mL, Daily    nicotine (NICODERM CQ) 7 mg/24hr TD 24 hr patch 1 patch, Daily    OLANZapine (ZyPREXA) tablet 2.5 mg, Q6H PRN **OR** OLANZapine (ZyPREXA) IM injection 2.5 mg, Q6H PRN    OLANZapine (ZyPREXA) tablet 5 mg, Q6H PRN **OR** OLANZapine (ZyPREXA) IM injection 5 mg, Q6H PRN    ondansetron (ZOFRAN-ODT) dispersible tablet 4 mg, Q6H PRN    polyethylene glycol (MIRALAX) packet 17 g, Daily PRN    polyethylene glycol (MIRALAX) packet 17 g, Daily    prazosin (MINIPRESS) capsule 2 mg, HS    senna-docusate sodium (SENOKOT S) 8.6-50 mg per tablet 2 tablet, HS    traZODone (DESYREL) tablet 50 mg, HS    No Known Allergies      Family and Social Support:   No data recorded    Behavioral  Observations: Patient was alert, oriented x 3 and cooperative; affect appeared appropriate and patient admitted to depressed mood and anxiety; thoughts appeared at times tangential with compromised attention and concentration; patient was unable to describe medical history.    Cognitive Examination    General Cognitive Functioning MMSE = [id number];     Attention/Concentration Auditory Selective Attention = Impaired; Auditory Vigilance = Impaired; Information Processing Speed = Within Normal Limits    Frontal Systems/Executive Functioning Mental Flexibility/Cognitive Control = Impaired; Working Memory = Impaired Abstract Reasoning = Impaired; Generative Ability = Impaired,     Language Functioning Confrontation naming = Within Normal Limits, Phonemic Fluency = Impaired; Semantic Retrieval = Impaired; Comprehension of Complex Ideational Material = Impaired; Praxis = Within Normal Limits; Repetition = Within Normal Limits; Basic Reading = Impaired; Following Commands = Impaired    Memory Functioning Narrative Recall - Short Delay = Impaired; Long Delay Narrative Recall = Impaired; Three word recall = Impaired    Visuo-Spatial Abilities Not Assessed    Functional Knowledge  Health & Safety Knowledge = Impaired;     Summary/Impression:  Results of Neuropsychological Exam revealed diffuse cognitive dysfunction and on a measure assessing awareness of personal health status and ability to evaluate health problems, handle medical emergencies and take safety precautions, patient performed in the IMPAIRED range of functioning. During this encounter, patient does not appear to have capacity to make fully informed medical decisions.

## 2024-09-13 NOTE — PROGRESS NOTES
09/13/24 0808   Team Meeting   Meeting Type Daily Rounds   Initial Conference Date 09/13/24   Next Conference Date 09/16/24   Team Members Present   Team Members Present Physician;Nurse;;   Physician Team Member JAIR Ponce J. Stives   Nursing Team Member Aleida Carcamo   Care Management Team Member Anum   Social Work Team Member Ayse   Patient/Family Present   Patient Present No   Patient's Family Present No     Fall yesterday, running to kitchen for water, CBC 8.26, attended 3 groups, uncontrollably laughing stating voices were telling her jokes, Clozaril 600 without response, paranoid, AH telling her not to take meds, discharge pending CRR.

## 2024-09-13 NOTE — NURSING NOTE
Patient visible in the milieu , denies all psych s/s, calm and cooperative, safety measures maintained.

## 2024-09-13 NOTE — NURSING NOTE
"Patient is visible on the unit and social with peers. She is seen uncontrollably laughing during breakfast reporting \"Someone is telling me jokes.\" Patient reports auditory hallucinations are also telling her to not take her medications but she is agreeable to taking them with much encouragement. Patient expresses her desire for discharge stating \"I just really want to go home and that's the main thing.\" Patient encouraged to continue with treatment plan and taking medications as ordered. She endorses anxiety. Encouraged to inform staff of any needs or concerns.    "

## 2024-09-13 NOTE — TREATMENT TEAM
Pt attends ALL groups and visible.  Pt euphoric and laughing inappropriately pleasant and cooperative  Pt  able to identify a .  Pt checo to stay for duration and identify positive coping skill.   Pt calmer in afternoon and less distracting.       09/13/24 1000   Activity/Group Checklist   Group Other (Comment)  ([facility]: healthy ways to manage stress and anxiety)   Attendance Attended   Attendance Duration (min) 31-45   Interactions Other (Comment)  (laughing inapproriately)   Affect/Mood Wide   Goals Achieved Other (Comment);Identified triggers;Identified relapse prevention strategies;Discussed coping strategies;Discussed self-esteem issues;Able to listen to others;Able to engage in interactions;Able to manage/cope with feelings;Able to reflect/comment on own behavior;Able to self-disclose;Verbalized increased hopefulness;Able to recieve feedback

## 2024-09-14 PROCEDURE — 99232 SBSQ HOSP IP/OBS MODERATE 35: CPT | Performed by: PSYCHIATRY & NEUROLOGY

## 2024-09-14 RX ADMIN — Medication 15 ML: at 08:01

## 2024-09-14 RX ADMIN — LORAZEPAM 0.5 MG: 0.5 TABLET ORAL at 08:01

## 2024-09-14 RX ADMIN — ATORVASTATIN CALCIUM 10 MG: 10 TABLET, FILM COATED ORAL at 08:01

## 2024-09-14 RX ADMIN — POLYETHYLENE GLYCOL 3350 17 G: 17 POWDER, FOR SOLUTION ORAL at 08:02

## 2024-09-14 RX ADMIN — SENNOSIDES AND DOCUSATE SODIUM 2 TABLET: 8.6; 5 TABLET ORAL at 21:04

## 2024-09-14 RX ADMIN — FAMOTIDINE 20 MG: 20 TABLET ORAL at 17:04

## 2024-09-14 RX ADMIN — FLUOXETINE 120 MG: 20 SOLUTION ORAL at 08:05

## 2024-09-14 RX ADMIN — NICOTINE 1 PATCH: 7 PATCH, EXTENDED RELEASE TRANSDERMAL at 08:03

## 2024-09-14 RX ADMIN — ACETAMINOPHEN 975 MG: 325 TABLET, FILM COATED ORAL at 00:36

## 2024-09-14 RX ADMIN — LORAZEPAM 0.5 MG: 0.5 TABLET ORAL at 17:04

## 2024-09-14 RX ADMIN — CLOZAPINE 600 MG: 200 TABLET ORAL at 21:04

## 2024-09-14 RX ADMIN — FLUTICASONE FUROATE 1 PUFF: 100 POWDER RESPIRATORY (INHALATION) at 08:05

## 2024-09-14 RX ADMIN — FAMOTIDINE 20 MG: 20 TABLET ORAL at 08:02

## 2024-09-14 RX ADMIN — CARVEDILOL 6.25 MG: 6.25 TABLET, FILM COATED ORAL at 08:02

## 2024-09-14 RX ADMIN — CYANOCOBALAMIN TAB 1000 MCG 1000 MCG: 1000 TAB at 08:02

## 2024-09-14 RX ADMIN — DEXTRAN 70, GLYCERIN, HYPROMELLOSE 1 DROP: 1; 2; 3 SOLUTION/ DROPS OPHTHALMIC at 05:56

## 2024-09-14 RX ADMIN — MELATONIN TAB 3 MG 3 MG: 3 TAB at 21:04

## 2024-09-14 RX ADMIN — CARVEDILOL 6.25 MG: 6.25 TABLET, FILM COATED ORAL at 16:00

## 2024-09-14 RX ADMIN — PRAZOSIN HYDROCHLORIDE 2 MG: 1 CAPSULE ORAL at 21:04

## 2024-09-14 RX ADMIN — TRAZODONE HYDROCHLORIDE 50 MG: 50 TABLET ORAL at 21:04

## 2024-09-14 NOTE — PROGRESS NOTES
09/14/24 0900   Activity/Group Checklist   Group Other (Comment)  (OPEN STUDIO Art Therapy/Social Group, Music)   Attendance Attended   Attendance Duration (min) Greater than 60  (75 min group)   Interactions Disorganized interaction   Affect/Mood Other (Comment)  (Unfocused)   Goals Achieved Able to engage in interactions

## 2024-09-14 NOTE — PLAN OF CARE
Problem: Alteration in Thoughts and Perception  Goal: Verbalize thoughts and feelings  Description: Interventions:  - Promote a nonjudgmental and trusting relationship with the patient through active listening and therapeutic communication  - Assess patient's level of functioning, behavior and potential for risk  - Engage patient in 1 on 1 interactions  - Encourage patient to express fears, feelings, frustrations, and discuss symptoms    - [place] patient to reality, help patient recognize reality-based thinking   - Administer medications as ordered and assess for potential side effects  - Provide the patient education related to the signs and symptoms of the illness and desired effects of prescribed medications  Outcome: Progressing  Goal: Agree to be compliant with medication regime, as prescribed and report medication side effects  Description: Interventions:  - Offer appropriate PRN medication and supervise ingestion; conduct AIMS, as needed   Outcome: Progressing  Goal: Attend and participate in unit activities, including therapeutic, recreational, and educational groups  Description: Interventions:  -Encourage Visitation and family involvement in care  Outcome: Progressing  Goal: Complete daily ADLs, including personal hygiene independently, as able  Description: Interventions:  - Observe, teach, and assist patient with ADLS  - Monitor and promote a balance of rest/activity, with adequate nutrition and elimination   Outcome: Progressing     Problem: Ineffective Coping  Goal: Understands least restrictive measures  Description: Interventions:  - Utilize least restrictive behavior  Outcome: Progressing     Problem: Depression  Goal: Verbalize thoughts and feelings  Description: Interventions:  - Assess and re-assess patient's level of risk   - Engage patient in 1:1 interactions, daily, for a minimum of 15 minutes   - Encourage patient to express feelings, fears, frustrations, hopes   Outcome: Progressing  Goal:  Refrain from isolation  Description: Interventions:  - Develop a trusting relationship   - Encourage socialization   Outcome: Progressing     Problem: Anxiety  Goal: Anxiety is at manageable level  Description: Interventions:  - Assess and monitor patient's anxiety level.   - Monitor for signs and symptoms (heart palpitations, chest pain, shortness of breath, headaches, nausea, feeling jumpy, restlessness, irritable, apprehensive).   - Collaborate with interdisciplinary team and initiate plan and interventions as ordered.  - [place] patient to unit/surroundings  - Explain treatment plan  - Encourage participation in care  - Encourage verbalization of concerns/fears  - Identify coping mechanisms  - Assist in developing anxiety-reducing skills  - Administer/offer alternative therapies  - Limit or eliminate stimulants  Outcome: Progressing     Problem: Potential for Falls  Goal: Patient will remain free of falls  Description: INTERVENTIONS:  - Educate patient on patient safety including physical limitations  - Instruct patient to call for assistance with activity   - Consult OT/PT to assist with strengthening/mobility   - Keep Call bell within reach  - Keep bed low and locked with side rails adjusted as appropriate  - Keep care items and personal belongings within reach  - Initiate and maintain comfort rounds  - Offer Toileting every 2 Hours, in advance of need  - Initiate/Maintain bed and chair alarm  - Obtain necessary fall risk management equipment: walker, wheelchair  - Apply yellow socks and bracelet for high fall risk patients  - Patient moved to room near nurses station  Outcome: Progressing     Problem: Nutrition/Hydration-ADULT  Goal: Nutrient/Hydration intake appropriate for improving, restoring or maintaining nutritional needs  Description: Monitor and assess patient's nutrition/hydration status for malnutrition. Collaborate with interdisciplinary team and initiate plan and interventions as ordered.  Monitor  patient's weight and dietary intake as ordered or per policy. Utilize nutrition screening tool and intervene as necessary. Determine patient's food preferences and provide high-protein, high-caloric foods as appropriate.     INTERVENTIONS:  - Monitor oral intake, urinary output, labs, and treatment plans  - Assess nutrition and hydration status and recommend course of action  - Evaluate amount of meals eaten  - Assist patient with eating if necessary   - Allow adequate time for meals  - Recommend/ encourage appropriate diets, oral nutritional supplements, and vitamin/mineral supplements  - Order, calculate, and assess calorie counts as needed  - Recommend, monitor, and adjust tube feedings and TPN/PPN based on assessed needs  - Assess need for intravenous fluids  - Provide specific nutrition/hydration education as appropriate  - Include patient/family/caregiver in decisions related to nutrition  Outcome: Progressing

## 2024-09-14 NOTE — NURSING NOTE
Patient is medication and meal compliant. Appetite is good with patient eating 100% of most meals. No complaints of pain or discomfort. Patient remains confused as to why she is here and when questioned regarding this, she goes off on a tangent going from one topic to another. Patient denies depression or anxiety. Patient also denies SI, AH or VH. Will continue to monitor patient for changes in mood or behavior.

## 2024-09-14 NOTE — PROGRESS NOTES
Psychiatric Progress Note - Department of Behavioral Health   Meli Nowak 59 y.o. female MRN: [id number]  Unit/Bed#: [facility] 651-01 Encounter: [id number]    ASSESSMENT & PLAN     Assessment & Plan  Schizoaffective disorder, bipolar type (HCC)  Progress towards goals  Awaiting CRR placement  Continue medications as prescribed  Clozaril 600 mg nightly for schizoaffective disorder  Prozac 120 mg daily for depressive symptoms   Ativan 0.5 mg twice daily for anxiety and paranoia  Melatonin 3 mg nightly for insomnia  Prazosin 2 mg nightly for PTSD associated nightmares   trazodone 50 mg nightly for insomnia    Orders from past 72 hours:    Inpatient consult to Neuropsychology; Standing    Inpatient consult to Neuropsychology      Treatment Recommendations/Precautions:  Continue to promote patient participation in therapeutic milieu.  Continue medical management per medicine.  Continue previously prescribed psychotropic medication regimen; see below.  Continue behavioral health checks q.7 minutes.   Continue vitals per behavioral health unit protocol.  Discharge date per primary team; 304 commitment status.    SUBJECTIVE     Patient evaluated this a.m. for continuity of care. Patient was discussed at length with nursing and treatment team. Per nursing, patient remains predominantly calm and cooperative, intermittently interactive in the milieu, adherent to her medications without any acute adverse effects. No acute distress is noted throughout evaluation. Meli Nowak denies suicidal/homicidal ideation in addition to thoughts of self-injury, receptive to crisis planning provided by this writer, contacting for safety in the inpatient setting, admitting to an ability to appropriately confide in staff including this writer.  Patient appears suspicious of this writer on approach, paranoid, superficial, denying any/all psychiatric complaint/concerns, perseverative on discharge disposition, appearing marginally receptive to  psychoeducation and supportive therapy provided by this writer, assessing inappropriate laughter throughout evaluation    PSYCHIATRIC REVIEW OF SYSTEMS     Behavior over the last 24 hours: Unchanged  Sleep: Adequate  Appetite: Adequate  Medication side effects: None    REVIEW OF SYSTEMS   Review of systems: No complaints    OBJECTIVE     Vital Signs in Past 24 Hours:  Temp:  [97.4 °F (36.3 °C)-98.2 °F (36.8 °C)] 97.4 °F (36.3 °C)  HR:  [79-85] 83  Resp:  [18] 18  BP: (136-159)/(60-81) 159/73    Intake/Output in Past 24 hours:  I/O last 3 completed shifts:  In: 1080 [P.O.:1080]  Out: -   No intake/output data recorded.        Laboratory Results:  I have personally reviewed all pertinent laboratory/tests results.  Most Recent Labs:   Lab Results   Component Value Date    WBC 8.26 09/13/2024    RBC 4.24 09/13/2024    HGB 12.5 09/13/2024    HCT 40.7 09/13/2024     09/13/2024    RDW 15.4 (H) 09/13/2024    NEUTROABS 4.20 09/13/2024    SODIUM 140 08/10/2024    K 3.8 08/10/2024     08/10/2024    CO2 29 08/10/2024    BUN 9 08/10/2024    CREATININE 0.89 08/10/2024    GLUC 98 08/10/2024    GLUF 98 08/10/2024    CALCIUM 9.3 08/10/2024    AST 28 08/06/2024    ALT 26 08/06/2024    ALKPHOS 82 08/06/2024    TP 6.6 08/06/2024    ALB 3.6 08/06/2024    TBILI 0.54 08/06/2024    AMMONIA 32 07/03/2024    [id number] 2.000 07/24/2024    HGBA1C 6.4 (H) 05/03/2024     05/03/2024       Behavioral Health Medications: all current active meds have been reviewed and current meds:   Current Facility-Administered Medications:     acetaminophen (TYLENOL) tablet 650 mg, Q4H PRN    acetaminophen (TYLENOL) tablet 650 mg, Q4H PRN    acetaminophen (TYLENOL) tablet 975 mg, Q6H PRN    aluminum-magnesium hydroxide-simethicone (MAALOX) oral suspension 30 mL, Q4H PRN    Artificial Tears Op Soln 1 drop, Q6H PRN    atorvastatin (LIPITOR) tablet 10 mg, Daily    bisacodyl (DULCOLAX) rectal suppository 10 mg, Daily PRN    carvedilol (COREG)  tablet 6.25 mg, BID With Meals    clozapine (CLOZARIL) tablet 600 mg, HS    cyanocobalamin (VITAMIN B-12) tablet 1,000 mcg, Daily    famotidine (PEPCID) tablet 20 mg, BID    FLUoxetine (PROzac) oral solution 120 mg, Daily    fluticasone (ARNUITY ELLIPTA) 100 MCG/ACT inhaler 1 puff, Daily    hydrOXYzine HCL (ATARAX) tablet 25 mg, Q6H PRN Max 4/day    hydrOXYzine HCL (ATARAX) tablet 50 mg, Q6H PRN Max 4/day    ipratropium-albuterol (DUO-NEB) 0.5-2.5 mg/3 mL inhalation solution 3 mL, Q4H PRN    LORazepam (ATIVAN) tablet 0.5 mg, BID **OR** LORazepam (ATIVAN) injection 0.5 mg, BID    LORazepam (ATIVAN) injection 1 mg, Q6H PRN Max 3/day    LORazepam (ATIVAN) tablet 1 mg, Q6H PRN Max 3/day    melatonin tablet 3 mg, HS    mirtazapine (REMERON) tablet 7.5 mg, HS PRN    Multivitamin liquid 15 mL, Daily    nicotine (NICODERM CQ) 7 mg/24hr TD 24 hr patch 1 patch, Daily    OLANZapine (ZyPREXA) tablet 2.5 mg, Q6H PRN **OR** OLANZapine (ZyPREXA) IM injection 2.5 mg, Q6H PRN    OLANZapine (ZyPREXA) tablet 5 mg, Q6H PRN **OR** OLANZapine (ZyPREXA) IM injection 5 mg, Q6H PRN    ondansetron (ZOFRAN-ODT) dispersible tablet 4 mg, Q6H PRN    polyethylene glycol (MIRALAX) packet 17 g, Daily PRN    polyethylene glycol (MIRALAX) packet 17 g, Daily    prazosin (MINIPRESS) capsule 2 mg, HS    senna-docusate sodium (SENOKOT S) 8.6-50 mg per tablet 2 tablet, HS    traZODone (DESYREL) tablet 50 mg, HS.    Risks, benefits and possible side effects of Medications:   Risks, benefits, and possible side effects of medications explained to patient and patient verbalizes understanding.      Mental Status Evaluation:  Appearance:  age appropriate, casually dressed, and disheveled   Behavior:  psychomotor retardation, superficial, suspicious   Speech:  normal pitch and normal volume   Mood:  euthymic   Affect:  blunted and mood-incongruent   Language sparse   Thought Process:  disorganized, illogical, and perserverative   Thought Content:  Negative  thinking, paranoid at times   Perceptual Disturbances: None although appears internally preoccupied   Risk Potential: Suicidal Ideations none, Homicidal Ideations none, and Potential for Aggression No   Sensorium:  person, place, and time/date   Cognition:  recent and remote memory grossly intact   Consciousness:  alert and awake    Attention: attention span appeared shorter than expected for age   Insight:  limited   Judgment: limited   Intellect Not assessed   Gait/Station: normal gait/station and normal balance   Motor Activity: no abnormal movements     Memory: Short and long term memory  fair     Progress Toward Goals: unchanged, as evidenced by their participation (or lack thereof) in individual, social and therapeutic milieu in addition to adherence to their medication regimen.    Recommended Treatment:   See above for assessment and plan.    Inpatient Psychiatric Certification: Based upon physical, mental and social evaluations, I certify that inpatient psychiatric services are medically necessary for this patient for a duration of greater than 2 midnights for the treatment of Schizoaffective disorder, bipolar type (HCC) including psychotropic medication management, participation in the therapeutic milieu and referrals as indicated. Available alternative community resources do not meet the patient's mental health care needs. I further attest that an established written individualized plan of care has been implemented and is outlined in the patient's medical records.    Counseling/Coordination of Care    I have expended greater than 15 minutes in which more than 50% of this time was expended in counseling/coordination of patient care relating to diagnostic results, prognosis, potential risks and benefits of management options, instructions for appropriate management, patient and/or collateral education, importance of adherence to management and/or risk factor reductions. Patient's rights, confidentiality,  exceptions to confidentiality, use of electronic medical record including appropriate staff access to medical record regarding behavioral health services and consent to treatment were reviewed.    Note Share:     This note was not shared with the patient due to reasonable likelihood of causing patient harm     This note has been constructed using a voice recognition system. There may be translation, syntax,  or grammatical errors. If you have any questions, please contact the dictating provider.    Jordan Christopher Holter, DO 09/14/24

## 2024-09-14 NOTE — NURSING NOTE
Patient requested and was given Tylenol 975 mg for back pain at 0036. Patient stated her back was killing her. Will continue to monitor.

## 2024-09-14 NOTE — ASSESSMENT & PLAN NOTE
Progress towards goals  Awaiting CRR placement  Continue medications as prescribed  Clozaril 600 mg nightly for schizoaffective disorder  Prozac 120 mg daily for depressive symptoms   Ativan 0.5 mg twice daily for anxiety and paranoia  Melatonin 3 mg nightly for insomnia  Prazosin 2 mg nightly for PTSD associated nightmares   trazodone 50 mg nightly for insomnia    Orders from past 72 hours:    Inpatient consult to Neuropsychology; Standing    Inpatient consult to Neuropsychology

## 2024-09-15 PROCEDURE — 99232 SBSQ HOSP IP/OBS MODERATE 35: CPT | Performed by: PSYCHIATRY & NEUROLOGY

## 2024-09-15 RX ADMIN — PRAZOSIN HYDROCHLORIDE 2 MG: 1 CAPSULE ORAL at 21:08

## 2024-09-15 RX ADMIN — FLUTICASONE FUROATE 1 PUFF: 100 POWDER RESPIRATORY (INHALATION) at 08:08

## 2024-09-15 RX ADMIN — LORAZEPAM 0.5 MG: 0.5 TABLET ORAL at 17:15

## 2024-09-15 RX ADMIN — FAMOTIDINE 20 MG: 20 TABLET ORAL at 08:05

## 2024-09-15 RX ADMIN — NICOTINE 1 PATCH: 7 PATCH, EXTENDED RELEASE TRANSDERMAL at 08:05

## 2024-09-15 RX ADMIN — MELATONIN TAB 3 MG 3 MG: 3 TAB at 21:08

## 2024-09-15 RX ADMIN — CARVEDILOL 6.25 MG: 6.25 TABLET, FILM COATED ORAL at 07:55

## 2024-09-15 RX ADMIN — FLUOXETINE 120 MG: 20 SOLUTION ORAL at 08:06

## 2024-09-15 RX ADMIN — CARVEDILOL 6.25 MG: 6.25 TABLET, FILM COATED ORAL at 16:12

## 2024-09-15 RX ADMIN — CYANOCOBALAMIN TAB 1000 MCG 1000 MCG: 1000 TAB at 08:47

## 2024-09-15 RX ADMIN — CLOZAPINE 600 MG: 200 TABLET ORAL at 21:08

## 2024-09-15 RX ADMIN — TRAZODONE HYDROCHLORIDE 50 MG: 50 TABLET ORAL at 21:08

## 2024-09-15 RX ADMIN — ATORVASTATIN CALCIUM 10 MG: 10 TABLET, FILM COATED ORAL at 08:05

## 2024-09-15 RX ADMIN — Medication 15 ML: at 08:05

## 2024-09-15 RX ADMIN — SENNOSIDES AND DOCUSATE SODIUM 2 TABLET: 8.6; 5 TABLET ORAL at 21:08

## 2024-09-15 RX ADMIN — LORAZEPAM 0.5 MG: 0.5 TABLET ORAL at 08:05

## 2024-09-15 RX ADMIN — POLYETHYLENE GLYCOL 3350 17 G: 17 POWDER, FOR SOLUTION ORAL at 08:06

## 2024-09-15 RX ADMIN — FAMOTIDINE 20 MG: 20 TABLET ORAL at 17:15

## 2024-09-15 NOTE — PLAN OF CARE
Problem: Prexisting or High Potential for Compromised Skin Integrity  Goal: Skin integrity is maintained or improved  Description: INTERVENTIONS:  - Identify patients at risk for skin breakdown  - Assess and monitor skin integrity  - Assess and monitor nutrition and hydration status  - Monitor labs   - Assess for incontinence   - Turn and reposition patient  - Assist with mobility/ambulation  - Relieve pressure over bony prominences  - Avoid friction and shearing  - Provide appropriate hygiene as needed including keeping skin clean and dry  - Evaluate need for skin moisturizer/barrier cream  - Collaborate with interdisciplinary team   - Patient/family teaching  - Consider wound care consult   Outcome: Progressing     Problem: Alteration in Thoughts and Perception  Goal: Treatment Goal: Gain control of psychotic behaviors/thinking, reduce/eliminate presenting symptoms and demonstrate improved reality functioning upon discharge  Outcome: Progressing  Goal: Verbalize thoughts and feelings  Description: Interventions:  - Promote a nonjudgmental and trusting relationship with the patient through active listening and therapeutic communication  - Assess patient's level of functioning, behavior and potential for risk  - Engage patient in 1 on 1 interactions  - Encourage patient to express fears, feelings, frustrations, and discuss symptoms    - [place] patient to reality, help patient recognize reality-based thinking   - Administer medications as ordered and assess for potential side effects  - Provide the patient education related to the signs and symptoms of the illness and desired effects of prescribed medications  Outcome: Progressing  Goal: Refrain from acting on delusional thinking/internal stimuli  Description: Interventions:  - Monitor patient closely, per order   - Utilize least restrictive measures   - Set reasonable limits, give positive feedback for acceptable   - Administer medications as ordered and monitor  of potential side effects  Outcome: Progressing  Goal: Agree to be compliant with medication regime, as prescribed and report medication side effects  Description: Interventions:  - Offer appropriate PRN medication and supervise ingestion; conduct AIMS, as needed   Outcome: Progressing  Goal: Recognize dysfunctional thoughts, communicate reality-based thoughts at the time of discharge  Description: Interventions:  - Provide medication and psycho-education to assist patient in compliance and developing insight into his/her illness   Outcome: Progressing  Goal: Complete daily ADLs, including personal hygiene independently, as able  Description: Interventions:  - Observe, teach, and assist patient with ADLS  - Monitor and promote a balance of rest/activity, with adequate nutrition and elimination   Outcome: Progressing     Problem: Risk for Self Injury/Neglect  Goal: Treatment Goal: Remain safe during length of stay, learn and adopt new coping skills, and be free of self-injurious ideation, impulses and acts at the time of discharge  Outcome: Progressing  Goal: Verbalize thoughts and feelings  Description: Interventions:  - Assess and re-assess patient's lethality and potential for self-injury  - Engage patient in 1:1 interactions, daily, for a minimum of 15 minutes  - Encourage patient to express feelings, fears, frustrations, hopes  - Establish rapport/trust with patient   Outcome: Progressing  Goal: Refrain from harming self  Description: Interventions:  - Monitor patient closely, per order  - Develop a trusting relationship  - Supervise medication ingestion, monitor effects and side effects   Outcome: Progressing  Goal: Recognize maladaptive responses and adopt new coping mechanisms  Outcome: Progressing     Problem: Depression  Goal: Treatment Goal: Demonstrate behavioral control of depressive symptoms, verbalize feelings of improved mood/affect, and adopt new coping skills prior to discharge  Outcome:  Progressing  Goal: Verbalize thoughts and feelings  Description: Interventions:  - Assess and re-assess patient's level of risk   - Engage patient in 1:1 interactions, daily, for a minimum of 15 minutes   - Encourage patient to express feelings, fears, frustrations, hopes   Outcome: Progressing  Goal: Refrain from isolation  Description: Interventions:  - Develop a trusting relationship   - Encourage socialization   Outcome: Progressing  Goal: Refrain from self-neglect  Outcome: Progressing     Problem: Anxiety  Goal: Anxiety is at manageable level  Description: Interventions:  - Assess and monitor patient's anxiety level.   - Monitor for signs and symptoms (heart palpitations, chest pain, shortness of breath, headaches, nausea, feeling jumpy, restlessness, irritable, apprehensive).   - Collaborate with interdisciplinary team and initiate plan and interventions as ordered.  - [place] patient to unit/surroundings  - Explain treatment plan  - Encourage participation in care  - Encourage verbalization of concerns/fears  - Identify coping mechanisms  - Assist in developing anxiety-reducing skills  - Administer/offer alternative therapies  - Limit or eliminate stimulants  Outcome: Progressing     Problem: Potential for Falls  Goal: Patient will remain free of falls  Description: INTERVENTIONS:  - Educate patient on patient safety including physical limitations  - Instruct patient to call for assistance with activity   - Consult OT/PT to assist with strengthening/mobility   - Keep Call bell within reach  - Keep bed low and locked with side rails adjusted as appropriate  - Keep care items and personal belongings within reach  - Initiate and maintain comfort rounds  - Offer Toileting every 2 Hours, in advance of need  - Initiate/Maintain bed and chair alarm  - Obtain necessary fall risk management equipment: walker, wheelchair  - Apply yellow socks and bracelet for high fall risk patients  - Patient moved to room near nurses  station  Outcome: Progressing

## 2024-09-15 NOTE — PROGRESS NOTES
"    Psychiatric Progress Note - Department of Behavioral Health   Meli Nowak 59 y.o. female MRN: [id number]  Unit/Bed#: [facility] 651-01 Encounter: [id number]    ASSESSMENT & PLAN     Assessment & Plan  Schizoaffective disorder, bipolar type (HCC)  Progress towards goals  Awaiting CRR placement  Continue medications as prescribed  Clozaril 600 mg nightly for schizoaffective disorder  Prozac 120 mg daily for depressive symptoms   Ativan 0.5 mg twice daily for anxiety and paranoia  Melatonin 3 mg nightly for insomnia  Prazosin 2 mg nightly for PTSD associated nightmares   trazodone 50 mg nightly for insomnia    Orders from past 72 hours:    Inpatient consult to Neuropsychology; Standing    Inpatient consult to Neuropsychology      Treatment Recommendations/Precautions:  Continue to promote patient participation in therapeutic milieu.  Continue medical management per medicine.  Continue previously prescribed psychotropic medication regimen; see below.  Continue behavioral health checks q.7 minutes.   Continue vitals per behavioral health unit protocol.  Discharge date per primary team; 304 commitment status.    SUBJECTIVE     Patient evaluated this a.m. for continuity of care. Patient was discussed at length with nursing and treatment team. Per nursing, patient remains predominantly calm and cooperative although isolative in the milieu, adherent to her medications without any acute adverse effects. No acute distress is noted throughout evaluation. Meli Nowak denies suicidal/homicidal ideation in addition to thoughts of self-injury, receptive to crisis planning provided by this writer, contacting for safety in the inpatient setting, admitting to an ability to appropriately confide in staff including this writer.  Patient denies any/all psychiatric complaints/concerns despite statement that she is \" paranoid\", unable/unwilling to elaborate, appearing superficial, suspicious, appearing marginally receptive to " psychoeducation and supportive therapy provided by this writer    PSYCHIATRIC REVIEW OF SYSTEMS     Behavior over the last 24 hours:  unchanged  Sleep: adequate  Appetite: adequate  Medication side effects: No    REVIEW OF SYSTEMS   Review of systems: no complaints    OBJECTIVE     Vital Signs in Past 24 Hours:  Temp:  [97.3 °F (36.3 °C)-97.6 °F (36.4 °C)] 97.5 °F (36.4 °C)  HR:  [87-92] 92  Resp:  [16-20] 20  BP: (134-158)/(67-75) 158/67    Intake/Output in Past 24 hours:  I/O last 3 completed shifts:  In: 1040 [P.O.:1040]  Out: -   I/O this shift:  In: 480 [P.O.:480]  Out: -         Laboratory Results:  I have personally reviewed all pertinent laboratory/tests results.  Most Recent Labs:   Lab Results   Component Value Date    WBC 8.26 09/13/2024    RBC 4.24 09/13/2024    HGB 12.5 09/13/2024    HCT 40.7 09/13/2024     09/13/2024    RDW 15.4 (H) 09/13/2024    NEUTROABS 4.20 09/13/2024    SODIUM 140 08/10/2024    K 3.8 08/10/2024     08/10/2024    CO2 29 08/10/2024    BUN 9 08/10/2024    CREATININE 0.89 08/10/2024    GLUC 98 08/10/2024    GLUF 98 08/10/2024    CALCIUM 9.3 08/10/2024    AST 28 08/06/2024    ALT 26 08/06/2024    ALKPHOS 82 08/06/2024    TP 6.6 08/06/2024    ALB 3.6 08/06/2024    TBILI 0.54 08/06/2024    AMMONIA 32 07/03/2024    CPR4OEBBXGMA 2.000 07/24/2024    HGBA1C 6.4 (H) 05/03/2024     05/03/2024       Behavioral Health Medications: all current active meds have been reviewed and current meds:   Current Facility-Administered Medications:     acetaminophen (TYLENOL) tablet 650 mg, Q4H PRN    acetaminophen (TYLENOL) tablet 650 mg, Q4H PRN    acetaminophen (TYLENOL) tablet 975 mg, Q6H PRN    aluminum-magnesium hydroxide-simethicone (MAALOX) oral suspension 30 mL, Q4H PRN    Artificial Tears Op Soln 1 drop, Q6H PRN    atorvastatin (LIPITOR) tablet 10 mg, Daily    bisacodyl (DULCOLAX) rectal suppository 10 mg, Daily PRN    carvedilol (COREG) tablet 6.25 mg, BID With Meals     "clozapine (CLOZARIL) tablet 600 mg, HS    cyanocobalamin (VITAMIN B-12) tablet 1,000 mcg, Daily    famotidine (PEPCID) tablet 20 mg, BID    FLUoxetine (PROzac) oral solution 120 mg, Daily    fluticasone (ARNUITY ELLIPTA) 100 MCG/ACT inhaler 1 puff, Daily    hydrOXYzine HCL (ATARAX) tablet 25 mg, Q6H PRN Max 4/day    hydrOXYzine HCL (ATARAX) tablet 50 mg, Q6H PRN Max 4/day    ipratropium-albuterol (DUO-NEB) 0.5-2.5 mg/3 mL inhalation solution 3 mL, Q4H PRN    LORazepam (ATIVAN) tablet 0.5 mg, BID **OR** LORazepam (ATIVAN) injection 0.5 mg, BID    LORazepam (ATIVAN) injection 1 mg, Q6H PRN Max 3/day    LORazepam (ATIVAN) tablet 1 mg, Q6H PRN Max 3/day    melatonin tablet 3 mg, HS    mirtazapine (REMERON) tablet 7.5 mg, HS PRN    Multivitamin liquid 15 mL, Daily    nicotine (NICODERM CQ) 7 mg/24hr TD 24 hr patch 1 patch, Daily    OLANZapine (ZyPREXA) tablet 2.5 mg, Q6H PRN **OR** OLANZapine (ZyPREXA) IM injection 2.5 mg, Q6H PRN    OLANZapine (ZyPREXA) tablet 5 mg, Q6H PRN **OR** OLANZapine (ZyPREXA) IM injection 5 mg, Q6H PRN    ondansetron (ZOFRAN-ODT) dispersible tablet 4 mg, Q6H PRN    polyethylene glycol (MIRALAX) packet 17 g, Daily PRN    polyethylene glycol (MIRALAX) packet 17 g, Daily    prazosin (MINIPRESS) capsule 2 mg, HS    senna-docusate sodium (SENOKOT S) 8.6-50 mg per tablet 2 tablet, HS    traZODone (DESYREL) tablet 50 mg, HS.    Risks, benefits and possible side effects of Medications:   Risks, benefits, and possible side effects of medications explained to patient and patient verbalizes understanding.      Mental Status Evaluation:  Appearance:  age appropriate, casually dressed, and disheveled   Behavior:  psychomotor retardation superficial and suspicious possessing limited eye contact   Speech:  normal pitch and normal volume   Mood:  dysthymic; \"I'm paranoid\"   Affect:  blunted and mood-incongruent   Language sparse   Thought Process:  disorganized, illogical, and perserverative   Thought Content: "  delusions  paranoid   Perceptual Disturbances: None appearing internally preoccupied and distracted   Risk Potential: Suicidal Ideations none, Homicidal Ideations none, and Potential for Aggression No   Sensorium:  person, place, and time/date   Cognition:  recent and remote memory grossly intact   Consciousness:  alert and awake    Attention: attention span appeared shorter than expected for age   Insight:  limited   Judgment: limited   Intellect Not assessed   Gait/Station: Not assessed; in bed   Motor Activity: no abnormal movements     Memory: Short and long term memory  fair     Progress Toward Goals: unchanged, as evidenced by their participation (or lack thereof) in individual, social and therapeutic milieu in addition to adherence to their medication regimen    Recommended Treatment:   See above for assessment and plan.    Inpatient Psychiatric Certification: Based upon physical, mental and social evaluations, I certify that inpatient psychiatric services are medically necessary for this patient for a duration of greater than 2 midnights for the treatment of Schizoaffective disorder, bipolar type (HCC) including psychotropic medication management, participation in the therapeutic milieu and referrals as indicated. Available alternative community resources do not meet the patient's mental health care needs. I further attest that an established written individualized plan of care has been implemented and is outlined in the patient's medical records.    Counseling/Coordination of Care    I have expended greater than 15 minutes in which more than 50% of this time was expended in counseling/coordination of patient care relating to diagnostic results, prognosis, potential risks and benefits of management options, instructions for appropriate management, patient and/or collateral education, importance of adherence to management and/or risk factor reductions. Patient's rights, confidentiality, exceptions to  confidentiality, use of electronic medical record including appropriate staff access to medical record regarding behavioral health services and consent to treatment were reviewed.    Note Share:     This note was not shared with the patient due to reasonable likelihood of causing patient harm     This note has been constructed using a voice recognition system. There may be translation, syntax,  or grammatical errors. If you have any questions, please contact the dictating provider.    Jordan Christopher Holter, DO 09/15/24

## 2024-09-15 NOTE — NURSING NOTE
"Patient is medication and meal compliant. No complaints of pain or discomfort. Patient with several somatic complaints and paranoid thought reporting that \"someone put something in my tea\" even though her tea was in front of her the whole time she was eating breakfast. Patient also was questioning what to do if her medications stopped working and if this nurse would give her more.  Patient reassured that her food and drinks were not tampered with and that if she needs medication PRN, it would be provided to her. Patient seemed to understand same. Patient denies depression or anxiety. Will continue to monitor patient for changes in mood or behavior.  "

## 2024-09-15 NOTE — NURSING NOTE
"Patient slept throughout shift. Awaken by the writer to administer scheduled night time medications. Patient is disorganized with thought process and tangential. Patient is paranoid asking this writer \" what pills are you giving me? I don't take medications. Where's my bracelet why did you take it off of me?\". Reassurance provided and patient accepted scheduled hs medications. Patient encouraged to make needs known. Safety checks ongoing.  "

## 2024-09-16 LAB
BASOPHILS # BLD AUTO: 0.09 THOUSANDS/ΜL (ref 0–0.1)
BASOPHILS NFR BLD AUTO: 1 % (ref 0–1)
CK SERPL-CCNC: 52 U/L (ref 26–192)
CLOZAPINE SERPL-MCNC: 541 NG/ML (ref 350–900)
CRP SERPL QL: 8.6 MG/L
EOSINOPHIL # BLD AUTO: 0 THOUSAND/ΜL (ref 0–0.61)
EOSINOPHIL NFR BLD AUTO: 0 % (ref 0–6)
ERYTHROCYTE [DISTWIDTH] IN BLOOD BY AUTOMATED COUNT: 15.2 % (ref 11.6–15.1)
HCT VFR BLD AUTO: 41.3 % (ref 34.8–46.1)
HGB BLD-MCNC: 13.1 G/DL (ref 11.5–15.4)
IMM GRANULOCYTES # BLD AUTO: 0.02 THOUSAND/UL (ref 0–0.2)
IMM GRANULOCYTES NFR BLD AUTO: 0 % (ref 0–2)
LYMPHOCYTES # BLD AUTO: 2.61 THOUSANDS/ΜL (ref 0.6–4.47)
LYMPHOCYTES NFR BLD AUTO: 35 % (ref 14–44)
MCH RBC QN AUTO: 30.1 PG (ref 26.8–34.3)
MCHC RBC AUTO-ENTMCNC: 31.7 G/DL (ref 31.4–37.4)
MCV RBC AUTO: 95 FL (ref 82–98)
MONOCYTES # BLD AUTO: 0.95 THOUSAND/ΜL (ref 0.17–1.22)
MONOCYTES NFR BLD AUTO: 13 % (ref 4–12)
NEUTROPHILS # BLD AUTO: 3.86 THOUSANDS/ΜL (ref 1.85–7.62)
NEUTS SEG NFR BLD AUTO: 51 % (ref 43–75)
NRBC BLD AUTO-RTO: 0 /100 WBCS
PLATELET # BLD AUTO: 282 THOUSANDS/UL (ref 149–390)
PMV BLD AUTO: 9.1 FL (ref 8.9–12.7)
RBC # BLD AUTO: 4.35 MILLION/UL (ref 3.81–5.12)
WBC # BLD AUTO: 7.53 THOUSAND/UL (ref 4.31–10.16)

## 2024-09-16 PROCEDURE — 85025 COMPLETE CBC W/AUTO DIFF WBC: CPT

## 2024-09-16 PROCEDURE — 82550 ASSAY OF CK (CPK): CPT

## 2024-09-16 PROCEDURE — 80159 DRUG ASSAY CLOZAPINE: CPT

## 2024-09-16 PROCEDURE — 99232 SBSQ HOSP IP/OBS MODERATE 35: CPT | Performed by: STUDENT IN AN ORGANIZED HEALTH CARE EDUCATION/TRAINING PROGRAM

## 2024-09-16 PROCEDURE — 86140 C-REACTIVE PROTEIN: CPT

## 2024-09-16 RX ADMIN — CARVEDILOL 6.25 MG: 6.25 TABLET, FILM COATED ORAL at 16:52

## 2024-09-16 RX ADMIN — FAMOTIDINE 20 MG: 20 TABLET ORAL at 17:12

## 2024-09-16 RX ADMIN — LORAZEPAM 0.5 MG: 0.5 TABLET ORAL at 17:14

## 2024-09-16 RX ADMIN — MELATONIN TAB 3 MG 3 MG: 3 TAB at 21:14

## 2024-09-16 RX ADMIN — SENNOSIDES AND DOCUSATE SODIUM 2 TABLET: 8.6; 5 TABLET ORAL at 21:14

## 2024-09-16 RX ADMIN — ATORVASTATIN CALCIUM 10 MG: 10 TABLET, FILM COATED ORAL at 08:26

## 2024-09-16 RX ADMIN — Medication 15 ML: at 08:22

## 2024-09-16 RX ADMIN — CARVEDILOL 6.25 MG: 6.25 TABLET, FILM COATED ORAL at 07:50

## 2024-09-16 RX ADMIN — FAMOTIDINE 20 MG: 20 TABLET ORAL at 08:25

## 2024-09-16 RX ADMIN — TRAZODONE HYDROCHLORIDE 50 MG: 50 TABLET ORAL at 21:14

## 2024-09-16 RX ADMIN — NICOTINE 1 PATCH: 7 PATCH, EXTENDED RELEASE TRANSDERMAL at 08:23

## 2024-09-16 RX ADMIN — FLUOXETINE 120 MG: 20 SOLUTION ORAL at 08:25

## 2024-09-16 RX ADMIN — OLANZAPINE 5 MG: 5 TABLET, FILM COATED ORAL at 15:46

## 2024-09-16 RX ADMIN — CLOZAPINE 600 MG: 200 TABLET ORAL at 21:14

## 2024-09-16 RX ADMIN — PRAZOSIN HYDROCHLORIDE 2 MG: 1 CAPSULE ORAL at 21:14

## 2024-09-16 RX ADMIN — CYANOCOBALAMIN TAB 1000 MCG 1000 MCG: 1000 TAB at 08:25

## 2024-09-16 RX ADMIN — POLYETHYLENE GLYCOL 3350 17 G: 17 POWDER, FOR SOLUTION ORAL at 08:23

## 2024-09-16 RX ADMIN — LORAZEPAM 0.5 MG: 0.5 TABLET ORAL at 08:23

## 2024-09-16 RX ADMIN — FLUTICASONE FUROATE 1 PUFF: 100 POWDER RESPIRATORY (INHALATION) at 08:22

## 2024-09-16 NOTE — NURSING NOTE
"Patient is medication and meal compliant. No complaints of pain or discomfort. Patient is visible on the unit and social with peers. Patient denies depression or anxiety. Patient continues with paranoid thoughts regarding her meals and \"poison\". Patient is easily redirected when this happens. Will continue to monitor patient for changes in mood or behavior.  "

## 2024-09-16 NOTE — PROGRESS NOTES
09/16/24 0805   Team Meeting   Meeting Type Daily Rounds   Initial Conference Date 09/16/24   Next Conference Date 09/17/24   Team Members Present   Team Members Present Physician;Nurse;;   Physician Team Member Dr Banuelos, Dr Alberto, JAIR Sweet   Nursing Team Member Jason   Care Management Team Member Anum   Social Work Team Member Ayse   Patient/Family Present   Patient Present No   Patient's Family Present No     Suspicious, stating she needs oxygen, checked and she was fine, creatine elevated today, asking for water frequently, Clozaril level tonight, paranoid, discharge pending CRR.

## 2024-09-16 NOTE — TREATMENT TEAM
"   09/16/24 1546   [place] Violence Checklist   Assessment type Reassessment   Irritability 1   Confusion 1   Boisterousness 1   Threatening physical violence 0   Verbal threats 0   Violence 1   Broset score 4     Patient irritable in dayroom regarding TV channel. Patient punched table and when confronted, became agitated stating \"Who are you? No I didn't!\" Patient redirected back to room and remained agitated and boisterous. Patient unable to be verbally redirected. Patient offered PO medication and patient accepted Zyprexa 5 mg PO at 1546. Patient stating, \"You take a Zyprexa,\" after education attempted. Patient currently laying on bed. Patient able to and encouraged to inform staff of any needs.     "

## 2024-09-16 NOTE — NURSING NOTE
"Patient is calm and cooperative with care. Patient approached nurses' station during snack time stating.\" I don't want to eat anymore\" then placed her snacks on the counter before walking away. When this writer approached patient, she states \" I'm fine I just don't want to eat anymore.\" Therapeutic support provided. Medication compliant. Patient encouraged to make needs known. Safety checks ongoing.  "

## 2024-09-16 NOTE — PROGRESS NOTES
Progress Note - Behavioral Health   Name: Meli Nowak 59 y.o. female I MRN: [id number]  Unit/Bed#: [facility] 651-01 I Date of Admission: 7/23/2024   Date of Service: 9/16/2024 I Hospital Day: 55     Assessment & Plan  Schizoaffective disorder, bipolar type (HCC)  Progress towards goals  Awaiting CRR placement  Continue medications as prescribed  Clozaril 600 mg nightly for schizoaffective disorder  Prozac 120 mg daily for depressive symptoms   Ativan 0.5 mg twice daily for anxiety and paranoia  Melatonin 3 mg nightly for insomnia  Prazosin 2 mg nightly for PTSD associated nightmares   trazodone 50 mg nightly for insomnia    No associated orders from this encounter found during lookback period of 72 hours.        Plan   Progress Toward Goals:   Meli is progressing towards goals of inpatient psychiatric treatment by continued medication compliance and is attending therapeutic modalities on the milieu. However, the patient continues to require inpatient psychiatric hospitalization for continued medication management and titration to optimize symptom reduction, improve sleep hygiene, and demonstrate adequate self-care.    Recommended Treatment: Treatment plan and medication changes discussed and per the attending physician the plan is:    1.Continue with group therapy, milieu therapy and occupational therapy  2.Behavioral Health checks every 7 minutes  3.Continue frequent safety checks and vitals per unit protocol  4.Continue with SLIM medical management as indicated  5.Continue with current medication regimen  6.Will review labs in the a.m.  7.Disposition Planning: Discharge planning and efforts remain ongoing    Behavioral Health Medications: all current active meds have been reviewed and continue current psychiatric medications.  Current Facility-Administered Medications   Medication Dose Route Frequency Provider Last Rate    acetaminophen  650 mg Oral Q4H PRN JOSE ELIAS Figueroa      acetaminophen  650 mg Oral Q4H  PRN Marie Ziegler, CRNP      acetaminophen  975 mg Oral Q6H PRN Marie Ziegler, CRNP      aluminum-magnesium hydroxide-simethicone  30 mL Oral Q4H PRN Parris Bolanos, CRNP      Artificial Tears  1 drop Both Eyes Q6H PRN Dereje Banuelos MD      atorvastatin  10 mg Oral Daily Marie Ziegler, CRNP      bisacodyl  10 mg Rectal Daily PRN Marie Ziegler, CRNP      carvedilol  6.25 mg Oral BID With Meals Marie Ziegler, CRNP      cloZAPine  600 mg Oral HS Marie Ziegler, CRNP      cyanocobalamin  1,000 mcg Oral Daily Marie Ziegler, CRNP      famotidine  20 mg Oral BID Shan Fitzgerald, DO      FLUoxetine  120 mg Oral Daily Marie Ziegler, CRNP      fluticasone  1 puff Inhalation Daily Marie Ziegler, CRNP      hydrOXYzine HCL  25 mg Oral Q6H PRN Max 4/day Marie Ziegler, CRNP      hydrOXYzine HCL  50 mg Oral Q6H PRN Max 4/day Marie Ziegler, CRNP      ipratropium-albuterol  3 mL Nebulization Q4H PRN Darin Lawton MD      LORazepam  0.5 mg Oral BID Marie Ziegler, CRNP      Or    LORazepam  0.5 mg Intramuscular BID Marie Ziegler, CRNP      LORazepam  1 mg Intramuscular Q6H PRN Max 3/day Marie Ziegler, CRNP      LORazepam  1 mg Oral Q6H PRN Max 3/day Marie Ziegler, CRNP      melatonin  3 mg Oral HS Marie Ziegler, CRNP      mirtazapine  7.5 mg Oral HS PRN Marie Ziegler, CRNP      Multivitamin  15 mL Oral Daily Marie Ziegler, CRNP      nicotine  1 patch Transdermal Daily Marie Ziegler, CRNP      OLANZapine  2.5 mg Oral Q6H PRN Dereje Banuelos MD      Or    OLANZapine  2.5 mg Intramuscular Q6H PRN Dereje Banuelos MD      OLANZapine  5 mg Oral Q6H PRN Dereje Banuelos MD      Or    OLANZapine  5 mg Intramuscular Q6H PRN Dereje Banuelos MD      ondansetron  4 mg Oral Q6H PRN Darin Lawton MD      polyethylene glycol  17 g Oral Daily PRN Marie Ziegler, CRNP      polyethylene glycol  17 g Oral Daily Kristen Logan, CRNP      prazosin  2 mg Oral HS Marie Ziegler, CRVALE      senna-docusate  "sodium  2 tablet Oral HS Rehana Borrego PA-C      traZODone  50 mg Oral HS JOSE ELIAS Figueroa         Risks / Benefits of Treatment:  Risks, benefits, and possible side effects of medications explained to patient and patient verbalizes understanding and agreement for treatment.       Subjective    Patient was seen today for continuation of care, records reviewed and patient was discussed with the morning case review team.    Meli was seen today for psychiatric follow-up.  On assessment today, Meli was in her room.  Stating that she was really tired today, patient noted to be more withdrawn and less visible on the unit.  Still suspicious and paranoid, patient is also preoccupied with smoking post discharge.  Smoking cessation information provided, patient states \" I have been smoking since I was 14, I am not going to stop now\".  Sleep and appetite improving.  Patient is also somatically preoccupied at times, and requesting oxygen despite O2 saturations being WNL.  Clozaril currently prescribed at 600 mg daily, CBC, CRP and CK obtained for continuous monitoring.  CRP elevated at 8.6, we will continue to follow for any possible interventions.  Clozaril level also to be obtained this evening, we will consider possible up titration as patient continues to approach her baseline.  No changes to be made at this time.  Tentative discharge pending CRR placement.    Meli denies acute suicidal/self-harm ideation/intent/plan upon direct inquiry at this time.  Meli remains behaviorally appropriate, no agitation or aggression noted on exam or in report.  Meli also denies HI/AH/VH, and does not appear overtly manic.  No overt delusions or paranoia are verbalized. Impulse control is intact.  Meli remains adherent to her current psychotropic medication regimen and denies any side effects from medications, as well as none noted on exam.    Psychiatric Review of Systems:  Behavior over the last 24 hours:  unchanged.   Sleep: " good  Appetite: good  Medication side effects: none  ROS: no complaints, denies shortness of breath or chest pain and all other systems are negative for acute changes        Objective    Vitals:  Vitals:    09/16/24 0734   BP: 131/60   Pulse: 78   Resp: 17   Temp: 98.1 °F (36.7 °C)   SpO2: 94%       Laboratory Results:  I have personally reviewed all pertinent laboratory/tests results.    Mental Status Evaluation:  Appearance:  dressed appropriately, adequate grooming   Behavior:  bizarre, guarded   Speech:  normal rate and volume   Mood:  anxious   Affect:  constricted   Thought Process:  illogical, perseverative, concrete   Thought Content:  paranoid ideation, negative thoughts, ruminating thoughts   Perceptual Disturbances: denies auditory hallucinations when asked, does not appear responding to internal stimuli   Risk Potential: Suicidal ideation - None  Homicidal ideation - None  Potential for aggression - No   Memory:  recent and remote memory grossly intact   Sensorium  person, place, and time/date      Consciousness:  alert and awake   Attention: attention span and concentration are age appropriate   Insight:  limited   Judgment: limited   Gait/Station: normal gait/station   Motor Activity: no abnormal movements       Counseling / Coordination of Care:  Patient's progress reviewed with nursing staff.  Medications, treatment progress and treatment plan reviewed with patient.  Supportive counseling provided to the patient.    This note was completed in part utilizing Dragon dictation Software. Grammatical, translation, syntax errors, random word insertions, spelling mistakes, and incomplete sentences may be an occasional consequence of this system secondary to software limitations with voice recognition, ambient noise, and hardware issues. If you have any questions or concerns about the content, text, or information contained within the body of this dictation, please contact the provider for clarification

## 2024-09-16 NOTE — NURSING NOTE
"Patient was agitated and banging her fists on the table in the dayroom.   PRN Zyprexa was given at 1546 with effectiveness noted and decrease in agitation observed. Patient was then able to eat dinner and watch television without any agitation. Patient also reported she \"felt better\". Will continue to monitor patient for changes in mood or behavior.  "

## 2024-09-16 NOTE — PLAN OF CARE
Problem: Prexisting or High Potential for Compromised Skin Integrity  Goal: Skin integrity is maintained or improved  Description: INTERVENTIONS:  - Identify patients at risk for skin breakdown  - Assess and monitor skin integrity  - Assess and monitor nutrition and hydration status  - Monitor labs   - Assess for incontinence   - Turn and reposition patient  - Assist with mobility/ambulation  - Relieve pressure over bony prominences  - Avoid friction and shearing  - Provide appropriate hygiene as needed including keeping skin clean and dry  - Evaluate need for skin moisturizer/barrier cream  - Collaborate with interdisciplinary team   - Patient/family teaching  - Consider wound care consult   Outcome: Progressing     Problem: Alteration in Thoughts and Perception  Goal: Treatment Goal: Gain control of psychotic behaviors/thinking, reduce/eliminate presenting symptoms and demonstrate improved reality functioning upon discharge  Outcome: Progressing  Goal: Verbalize thoughts and feelings  Description: Interventions:  - Promote a nonjudgmental and trusting relationship with the patient through active listening and therapeutic communication  - Assess patient's level of functioning, behavior and potential for risk  - Engage patient in 1 on 1 interactions  - Encourage patient to express fears, feelings, frustrations, and discuss symptoms    - [place] patient to reality, help patient recognize reality-based thinking   - Administer medications as ordered and assess for potential side effects  - Provide the patient education related to the signs and symptoms of the illness and desired effects of prescribed medications  Outcome: Progressing  Goal: Refrain from acting on delusional thinking/internal stimuli  Description: Interventions:  - Monitor patient closely, per order   - Utilize least restrictive measures   - Set reasonable limits, give positive feedback for acceptable   - Administer medications as ordered and monitor  of potential side effects  Outcome: Progressing  Goal: Agree to be compliant with medication regime, as prescribed and report medication side effects  Description: Interventions:  - Offer appropriate PRN medication and supervise ingestion; conduct AIMS, as needed   Outcome: Progressing  Goal: Recognize dysfunctional thoughts, communicate reality-based thoughts at the time of discharge  Description: Interventions:  - Provide medication and psycho-education to assist patient in compliance and developing insight into his/her illness   Outcome: Progressing  Goal: Complete daily ADLs, including personal hygiene independently, as able  Description: Interventions:  - Observe, teach, and assist patient with ADLS  - Monitor and promote a balance of rest/activity, with adequate nutrition and elimination   Outcome: Progressing     Problem: Risk for Self Injury/Neglect  Goal: Treatment Goal: Remain safe during length of stay, learn and adopt new coping skills, and be free of self-injurious ideation, impulses and acts at the time of discharge  Outcome: Progressing  Goal: Verbalize thoughts and feelings  Description: Interventions:  - Assess and re-assess patient's lethality and potential for self-injury  - Engage patient in 1:1 interactions, daily, for a minimum of 15 minutes  - Encourage patient to express feelings, fears, frustrations, hopes  - Establish rapport/trust with patient   Outcome: Progressing  Goal: Refrain from harming self  Description: Interventions:  - Monitor patient closely, per order  - Develop a trusting relationship  - Supervise medication ingestion, monitor effects and side effects   Outcome: Progressing  Goal: Recognize maladaptive responses and adopt new coping mechanisms  Outcome: Progressing     Problem: Depression  Goal: Treatment Goal: Demonstrate behavioral control of depressive symptoms, verbalize feelings of improved mood/affect, and adopt new coping skills prior to discharge  Outcome:  Progressing  Goal: Verbalize thoughts and feelings  Description: Interventions:  - Assess and re-assess patient's level of risk   - Engage patient in 1:1 interactions, daily, for a minimum of 15 minutes   - Encourage patient to express feelings, fears, frustrations, hopes   Outcome: Progressing  Goal: Refrain from isolation  Description: Interventions:  - Develop a trusting relationship   - Encourage socialization   Outcome: Progressing  Goal: Refrain from self-neglect  Outcome: Progressing     Problem: Anxiety  Goal: Anxiety is at manageable level  Description: Interventions:  - Assess and monitor patient's anxiety level.   - Monitor for signs and symptoms (heart palpitations, chest pain, shortness of breath, headaches, nausea, feeling jumpy, restlessness, irritable, apprehensive).   - Collaborate with interdisciplinary team and initiate plan and interventions as ordered.  - [place] patient to unit/surroundings  - Explain treatment plan  - Encourage participation in care  - Encourage verbalization of concerns/fears  - Identify coping mechanisms  - Assist in developing anxiety-reducing skills  - Administer/offer alternative therapies  - Limit or eliminate stimulants  Outcome: Progressing

## 2024-09-16 NOTE — ASSESSMENT & PLAN NOTE
Progress towards goals  Awaiting CRR placement  Continue medications as prescribed  Clozaril 600 mg nightly for schizoaffective disorder  Prozac 120 mg daily for depressive symptoms   Ativan 0.5 mg twice daily for anxiety and paranoia  Melatonin 3 mg nightly for insomnia  Prazosin 2 mg nightly for PTSD associated nightmares   trazodone 50 mg nightly for insomnia    No associated orders from this encounter found during lookback period of 72 hours.

## 2024-09-17 PROCEDURE — 99232 SBSQ HOSP IP/OBS MODERATE 35: CPT | Performed by: STUDENT IN AN ORGANIZED HEALTH CARE EDUCATION/TRAINING PROGRAM

## 2024-09-17 RX ADMIN — SENNOSIDES AND DOCUSATE SODIUM 2 TABLET: 8.6; 5 TABLET ORAL at 21:25

## 2024-09-17 RX ADMIN — PRAZOSIN HYDROCHLORIDE 2 MG: 1 CAPSULE ORAL at 21:25

## 2024-09-17 RX ADMIN — Medication 15 ML: at 08:32

## 2024-09-17 RX ADMIN — FAMOTIDINE 20 MG: 20 TABLET ORAL at 18:02

## 2024-09-17 RX ADMIN — TRAZODONE HYDROCHLORIDE 50 MG: 50 TABLET ORAL at 21:25

## 2024-09-17 RX ADMIN — MELATONIN TAB 3 MG 3 MG: 3 TAB at 21:25

## 2024-09-17 RX ADMIN — FLUOXETINE 120 MG: 20 SOLUTION ORAL at 08:31

## 2024-09-17 RX ADMIN — CARVEDILOL 6.25 MG: 6.25 TABLET, FILM COATED ORAL at 08:29

## 2024-09-17 RX ADMIN — LORAZEPAM 0.5 MG: 0.5 TABLET ORAL at 18:02

## 2024-09-17 RX ADMIN — FAMOTIDINE 20 MG: 20 TABLET ORAL at 08:29

## 2024-09-17 RX ADMIN — CYANOCOBALAMIN TAB 1000 MCG 1000 MCG: 1000 TAB at 08:29

## 2024-09-17 RX ADMIN — ATORVASTATIN CALCIUM 10 MG: 10 TABLET, FILM COATED ORAL at 08:30

## 2024-09-17 RX ADMIN — FLUTICASONE FUROATE 1 PUFF: 100 POWDER RESPIRATORY (INHALATION) at 08:31

## 2024-09-17 RX ADMIN — LORAZEPAM 0.5 MG: 0.5 TABLET ORAL at 08:29

## 2024-09-17 RX ADMIN — CARVEDILOL 6.25 MG: 6.25 TABLET, FILM COATED ORAL at 16:19

## 2024-09-17 RX ADMIN — POLYETHYLENE GLYCOL 3350 17 G: 17 POWDER, FOR SOLUTION ORAL at 08:30

## 2024-09-17 RX ADMIN — CLOZAPINE 650 MG: 25 TABLET ORAL at 21:25

## 2024-09-17 NOTE — PLAN OF CARE
Problem: Alteration in Thoughts and Perception  Goal: Treatment Goal: Gain control of psychotic behaviors/thinking, reduce/eliminate presenting symptoms and demonstrate improved reality functioning upon discharge  Outcome: Progressing  Goal: Verbalize thoughts and feelings  Description: Interventions:  - Promote a nonjudgmental and trusting relationship with the patient through active listening and therapeutic communication  - Assess patient's level of functioning, behavior and potential for risk  - Engage patient in 1 on 1 interactions  - Encourage patient to express fears, feelings, frustrations, and discuss symptoms    - [place] patient to reality, help patient recognize reality-based thinking   - Administer medications as ordered and assess for potential side effects  - Provide the patient education related to the signs and symptoms of the illness and desired effects of prescribed medications  Outcome: Progressing  Goal: Refrain from acting on delusional thinking/internal stimuli  Description: Interventions:  - Monitor patient closely, per order   - Utilize least restrictive measures   - Set reasonable limits, give positive feedback for acceptable   - Administer medications as ordered and monitor of potential side effects  Outcome: Progressing  Goal: Agree to be compliant with medication regime, as prescribed and report medication side effects  Description: Interventions:  - Offer appropriate PRN medication and supervise ingestion; conduct AIMS, as needed   Outcome: Progressing  Goal: Attend and participate in unit activities, including therapeutic, recreational, and educational groups  Description: Interventions:  -Encourage Visitation and family involvement in care  Outcome: Not Progressing  Goal: Recognize dysfunctional thoughts, communicate reality-based thoughts at the time of discharge  Description: Interventions:  - Provide medication and psycho-education to assist patient in compliance and developing  insight into his/her illness   Outcome: Progressing  Goal: Complete daily ADLs, including personal hygiene independently, as able  Description: Interventions:  - Observe, teach, and assist patient with ADLS  - Monitor and promote a balance of rest/activity, with adequate nutrition and elimination   Outcome: Progressing     Problem: Risk for Self Injury/Neglect  Goal: Treatment Goal: Remain safe during length of stay, learn and adopt new coping skills, and be free of self-injurious ideation, impulses and acts at the time of discharge  Outcome: Progressing  Goal: Verbalize thoughts and feelings  Description: Interventions:  - Assess and re-assess patient's lethality and potential for self-injury  - Engage patient in 1:1 interactions, daily, for a minimum of 15 minutes  - Encourage patient to express feelings, fears, frustrations, hopes  - Establish rapport/trust with patient   Outcome: Progressing  Goal: Refrain from harming self  Description: Interventions:  - Monitor patient closely, per order  - Develop a trusting relationship  - Supervise medication ingestion, monitor effects and side effects   Outcome: Progressing  Goal: Recognize maladaptive responses and adopt new coping mechanisms  Outcome: Progressing     Problem: Depression  Goal: Treatment Goal: Demonstrate behavioral control of depressive symptoms, verbalize feelings of improved mood/affect, and adopt new coping skills prior to discharge  Outcome: Progressing  Goal: Verbalize thoughts and feelings  Description: Interventions:  - Assess and re-assess patient's level of risk   - Engage patient in 1:1 interactions, daily, for a minimum of 15 minutes   - Encourage patient to express feelings, fears, frustrations, hopes   Outcome: Progressing  Goal: Refrain from isolation  Description: Interventions:  - Develop a trusting relationship   - Encourage socialization   Outcome: Progressing  Goal: Refrain from self-neglect  Outcome: Progressing     Problem:  Anxiety  Goal: Anxiety is at manageable level  Description: Interventions:  - Assess and monitor patient's anxiety level.   - Monitor for signs and symptoms (heart palpitations, chest pain, shortness of breath, headaches, nausea, feeling jumpy, restlessness, irritable, apprehensive).   - Collaborate with interdisciplinary team and initiate plan and interventions as ordered.  - [place] patient to unit/surroundings  - Explain treatment plan  - Encourage participation in care  - Encourage verbalization of concerns/fears  - Identify coping mechanisms  - Assist in developing anxiety-reducing skills  - Administer/offer alternative therapies  - Limit or eliminate stimulants  Outcome: Progressing

## 2024-09-17 NOTE — PROGRESS NOTES
Progress Note - Behavioral Health   Name: Meli Nowak 59 y.o. female I MRN: [id number]  Unit/Bed#: [facility] 651-01 I Date of Admission: 7/23/2024   Date of Service: 9/17/2024 I Hospital Day: 56     Assessment & Plan  Schizoaffective disorder, bipolar type (HCC)  Progress towards goals  Awaiting CRR placement  Continue medications as prescribed  Clozaril 650 mg nightly for schizoaffective disorder  Prozac 120 mg daily for depressive symptoms   Ativan 0.5 mg twice daily for anxiety and paranoia  Melatonin 3 mg nightly for insomnia  Prazosin 2 mg nightly for PTSD associated nightmares   trazodone 50 mg nightly for insomnia    No associated orders from this encounter found during lookback period of 72 hours.        Plan   Progress Toward Goals:   Meli is progressing towards goals of inpatient psychiatric treatment by continued medication compliance and is attending therapeutic modalities on the milieu. However, the patient continues to require inpatient psychiatric hospitalization for continued medication management and titration to optimize symptom reduction, improve sleep hygiene, and demonstrate adequate self-care.    Recommended Treatment: Treatment plan and medication changes discussed and per the attending physician the plan is:    1.Continue with group therapy, milieu therapy and occupational therapy  2.Behavioral Health checks every 7 minutes  3.Continue frequent safety checks and vitals per unit protocol  4.Continue with SLIM medical management as indicated  5.Continue with current medication regimen  6.Will review labs in the a.m.  7.Disposition Planning: Discharge planning and efforts remain ongoing    Behavioral Health Medications: all current active meds have been reviewed and continue current psychiatric medications.  Current Facility-Administered Medications   Medication Dose Route Frequency Provider Last Rate    acetaminophen  650 mg Oral Q4H PRN JOSE ELIAS Figueroa      acetaminophen  650 mg Oral Q4H  PRN Marie Ziegler, CRNP      acetaminophen  975 mg Oral Q6H PRN Marie Ziegler, CRNP      aluminum-magnesium hydroxide-simethicone  30 mL Oral Q4H PRN Parris Bolanos, CRNP      Artificial Tears  1 drop Both Eyes Q6H PRN Dereje Banuelos MD      atorvastatin  10 mg Oral Daily Marie Ziegler, CRNP      bisacodyl  10 mg Rectal Daily PRN Marie Ziegler, CRNP      carvedilol  6.25 mg Oral BID With Meals Marie Ziegler, CRNP      cloZAPine  650 mg Oral HS Marie Ziegler, CRNP      cyanocobalamin  1,000 mcg Oral Daily Marie Ziegler, CRNP      famotidine  20 mg Oral BID Shan Fitzgerald, DO      FLUoxetine  120 mg Oral Daily Marie Ziegler, CRNP      fluticasone  1 puff Inhalation Daily Marie Ziegler, CRNP      hydrOXYzine HCL  25 mg Oral Q6H PRN Max 4/day Marie Ziegler, CRNP      hydrOXYzine HCL  50 mg Oral Q6H PRN Max 4/day Marie Ziegler, CRNP      ipratropium-albuterol  3 mL Nebulization Q4H PRN Darin Lawton MD      LORazepam  0.5 mg Oral BID Marie Ziegler, CRNP      Or    LORazepam  0.5 mg Intramuscular BID Marie Ziegler, CRNP      LORazepam  1 mg Intramuscular Q6H PRN Max 3/day Marie Ziegler, CRNP      LORazepam  1 mg Oral Q6H PRN Max 3/day Marie Ziegler, CRNP      melatonin  3 mg Oral HS Marie Ziegler, CRNP      mirtazapine  7.5 mg Oral HS PRN Marie Ziegler, CRNP      Multivitamin  15 mL Oral Daily Marie Ziegler, CRNP      nicotine  1 patch Transdermal Daily Marie Ziegler, CRNP      OLANZapine  2.5 mg Oral Q6H PRN Dereje Banuelos MD      Or    OLANZapine  2.5 mg Intramuscular Q6H PRN Dereje Banuelos MD      OLANZapine  5 mg Oral Q6H PRN Dereje Banuelos MD      Or    OLANZapine  5 mg Intramuscular Q6H PRN Dereje Banuelos MD      ondansetron  4 mg Oral Q6H PRN Darin Lawton MD      polyethylene glycol  17 g Oral Daily PRN Marie Ziegler, CRNP      polyethylene glycol  17 g Oral Daily Kristen Logan, CRNP      prazosin  2 mg Oral HS Marie Ziegler, CRVALE      senna-docusate  sodium  2 tablet Oral HS Rehana Borrego PA-C      traZODone  50 mg Oral HS JOSE ELIAS Figueroa         Risks / Benefits of Treatment:  Risks, benefits, and possible side effects of medications explained to patient and patient verbalizes understanding and agreement for treatment.       Subjective    Patient was seen today for continuation of care, records reviewed and patient was discussed with the morning case review team.    Meli was seen today for psychiatric follow-up.  On assessment today, Mlei was superficially cooperative.  Seen by this writer and student, patient continued to be guarded and often evasive when questioned.  Denying all symptoms at this time, patient continues to exhibit significant signs of paranoia.  Zyprexa 5 mg as needed provided overnight due to intrusive behavior, irritability and agitation.  Clozaril level of 541 on 9/16/2024. We will increase Clozaril to 650 mg nightly with level to be obtained on 9/23/2024.  CBC, CK and CRP continues to be weekly obtained for medication management.  LBM 9/16/2024 bowel regimen effective with no c/o constipation at this time.  Tentative discharge ongoing pending CRR placement.    Meli denies acute suicidal/self-harm ideation/intent/plan upon direct inquiry at this time.  Meli remains behaviorally appropriate, no agitation or aggression noted on exam or in report.  Impulse control is intact.  Meli remains adherent to her current psychotropic medication regimen and denies any side effects from medications, as well as none noted on exam.    Psychiatric Review of Systems:  Behavior over the last 24 hours:  unchanged.   Sleep: good  Appetite: good  Medication side effects: none  ROS: no complaints, denies shortness of breath or chest pain and all other systems are negative for acute changes        Objective    Vitals:  Vitals:    09/17/24 0820   BP: 169/91   Pulse: 100   Resp: 17   Temp: 97.8 °F (36.6 °C)   SpO2: 92%       Laboratory Results:  I have  personally reviewed all pertinent laboratory/tests results.  Labs in last 72 hours:   Recent Labs     09/16/24  0451   WBC 7.53   RBC 4.35   HGB 13.1   HCT 41.3      RDW 15.2*   NEUTROABS 3.86     CBC:   Lab Results   Component Value Date    WBC 7.53 09/16/2024    RBC 4.35 09/16/2024    HGB 13.1 09/16/2024    HCT 41.3 09/16/2024    MCV 95 09/16/2024     09/16/2024    MCH 30.1 09/16/2024    MCHC 31.7 09/16/2024    RDW 15.2 (H) 09/16/2024    MPV 9.1 09/16/2024    NRBC 0 09/16/2024    NEUTROABS 3.86 09/16/2024     CMP:   Lab Results   Component Value Date    SODIUM 140 08/10/2024    K 3.8 08/10/2024     08/10/2024    CO2 29 08/10/2024    AGAP 8 08/10/2024    BUN 9 08/10/2024    CREATININE 0.89 08/10/2024    GLUC 98 08/10/2024    GLUF 98 08/10/2024    CALCIUM 9.3 08/10/2024    AST 28 08/06/2024    ALT 26 08/06/2024    ALKPHOS 82 08/06/2024    TP 6.6 08/06/2024    ALB 3.6 08/06/2024    TBILI 0.54 08/06/2024    EGFR 72 08/10/2024     Clozapine:   Lab Results   Component Value Date    CLOZAPINE 541 09/16/2024       Mental Status Evaluation:  Appearance:  disheveled, marginal hygiene, overweight, bearded   Behavior:  cooperative, bizarre, guarded   Speech:  normal rate and volume   Mood:  anxious, less irritable   Affect:  constricted, slightly more reactive   Thought Process:  illogical, perseverative, concrete   Thought Content:  paranoid ideation, negative thoughts, ruminating thoughts   Perceptual Disturbances: less preoccupied   Risk Potential: Suicidal ideation - None  Homicidal ideation - None  Potential for aggression - No   Memory:  recent and remote memory grossly intact   Sensorium  person, place, and time/date      Consciousness:  alert and awake   Attention: attention span and concentration are age appropriate   Insight:  limited   Judgment: limited   Gait/Station: normal gait/station   Motor Activity: no abnormal movements       Counseling / Coordination of Care:  Patient's progress  reviewed with nursing staff.  Medications, treatment progress and treatment plan reviewed with patient.  Supportive counseling provided to the patient.    This note was completed in part utilizing Dragon dictation Software. Grammatical, translation, syntax errors, random word insertions, spelling mistakes, and incomplete sentences may be an occasional consequence of this system secondary to software limitations with voice recognition, ambient noise, and hardware issues. If you have any questions or concerns about the content, text, or information contained within the body of this dictation, please contact the provider for clarification

## 2024-09-17 NOTE — PROGRESS NOTES
09/17/24 0808   Team Meeting   Meeting Type Daily Rounds   Initial Conference Date 09/17/24   Next Conference Date 09/18/24   Team Members Present   Team Members Present Physician;Nurse;;   Physician Team Member Dr Banuelos, Dr Alberto, JAIR Romero   Nursing Team Member Clarissa   Care Management Team Member Anum   Social Work Team Member Ayse   Patient/Family Present   Patient Present No   Patient's Family Present No     Pending CRR placement, preoccupied with some peers, laughing and giggling, paranoid of staff, frustrated that she is not getting attention from staff, Clozaril level 541, increasing to 650 mg.

## 2024-09-17 NOTE — NURSING NOTE
Pt anxious and bizarre, redirected from sitting while taking a shower and flooding the area.  Pt stated that she was tired.  Med-compliant with good appetite and steady gait.  VSS.  Paranoid with guarded affect.  Did not attend group.  Monitored for safety and support.

## 2024-09-17 NOTE — ASSESSMENT & PLAN NOTE
Progress towards goals  Awaiting CRR placement  Continue medications as prescribed  Clozaril 650 mg nightly for schizoaffective disorder  Prozac 120 mg daily for depressive symptoms   Ativan 0.5 mg twice daily for anxiety and paranoia  Melatonin 3 mg nightly for insomnia  Prazosin 2 mg nightly for PTSD associated nightmares   trazodone 50 mg nightly for insomnia    No associated orders from this encounter found during lookback period of 72 hours.

## 2024-09-17 NOTE — NURSING NOTE
"Patient is paranoid and suspicious of select staff and refused cup of water asking staff \" What you put in my cup? I can't take it from you, only her\" pointing at other staff. Patient also required redirection from following male peer into his room during shift, she apologized then sat in the dayroom. Medication compliant. Patient is able to make needs known. Safety checks ongoing.  "

## 2024-09-17 NOTE — ASSESSMENT & PLAN NOTE
Malnutrition Findings:   Adult Malnutrition type: Social/enviromental  Adult Degree of Malnutrition: Malnutrition of mild degree  Malnutrition Characteristics: Inadequate energy, Weight loss                  360 Statement: Mild protein calorie malnutrition in the setting of mental illness as evidenced by 5% wt loss <1 month, <50-75% intake of estimated needs treated with Dysphagia 3 diet and supplements TID.    BMI Findings:           Body mass index is 33.85 kg/m².

## 2024-09-18 PROCEDURE — 99232 SBSQ HOSP IP/OBS MODERATE 35: CPT | Performed by: STUDENT IN AN ORGANIZED HEALTH CARE EDUCATION/TRAINING PROGRAM

## 2024-09-18 RX ADMIN — SENNOSIDES AND DOCUSATE SODIUM 2 TABLET: 8.6; 5 TABLET ORAL at 21:27

## 2024-09-18 RX ADMIN — FLUOXETINE 120 MG: 20 SOLUTION ORAL at 08:13

## 2024-09-18 RX ADMIN — CLOZAPINE 650 MG: 25 TABLET ORAL at 21:26

## 2024-09-18 RX ADMIN — FAMOTIDINE 20 MG: 20 TABLET ORAL at 08:10

## 2024-09-18 RX ADMIN — PRAZOSIN HYDROCHLORIDE 2 MG: 1 CAPSULE ORAL at 21:26

## 2024-09-18 RX ADMIN — Medication 15 ML: at 08:11

## 2024-09-18 RX ADMIN — ACETAMINOPHEN 975 MG: 325 TABLET, FILM COATED ORAL at 22:06

## 2024-09-18 RX ADMIN — FLUTICASONE FUROATE 1 PUFF: 100 POWDER RESPIRATORY (INHALATION) at 08:13

## 2024-09-18 RX ADMIN — ATORVASTATIN CALCIUM 10 MG: 10 TABLET, FILM COATED ORAL at 08:10

## 2024-09-18 RX ADMIN — MELATONIN TAB 3 MG 3 MG: 3 TAB at 21:35

## 2024-09-18 RX ADMIN — CARVEDILOL 6.25 MG: 6.25 TABLET, FILM COATED ORAL at 08:11

## 2024-09-18 RX ADMIN — NICOTINE 1 PATCH: 7 PATCH, EXTENDED RELEASE TRANSDERMAL at 09:08

## 2024-09-18 RX ADMIN — CARVEDILOL 6.25 MG: 6.25 TABLET, FILM COATED ORAL at 17:15

## 2024-09-18 RX ADMIN — TRAZODONE HYDROCHLORIDE 50 MG: 50 TABLET ORAL at 21:26

## 2024-09-18 RX ADMIN — POLYETHYLENE GLYCOL 3350 17 G: 17 POWDER, FOR SOLUTION ORAL at 08:10

## 2024-09-18 RX ADMIN — LORAZEPAM 0.5 MG: 0.5 TABLET ORAL at 08:10

## 2024-09-18 RX ADMIN — ONDANSETRON 4 MG: 4 TABLET, ORALLY DISINTEGRATING ORAL at 08:29

## 2024-09-18 RX ADMIN — LORAZEPAM 0.5 MG: 0.5 TABLET ORAL at 17:16

## 2024-09-18 RX ADMIN — FAMOTIDINE 20 MG: 20 TABLET ORAL at 17:15

## 2024-09-18 RX ADMIN — CYANOCOBALAMIN TAB 1000 MCG 1000 MCG: 1000 TAB at 08:10

## 2024-09-18 NOTE — PROGRESS NOTES
Progress Note - Behavioral Health   Name: Meli Nowak 59 y.o. female I MRN: [id number]  Unit/Bed#: [facility] 651-01 I Date of Admission: 7/23/2024   Date of Service: 9/18/2024 I Hospital Day: 57     Assessment & Plan  Schizoaffective disorder, bipolar type (HCC)  Progress towards goals  Awaiting CRR placement  Continue medications as prescribed  Clozaril 650 mg nightly for schizoaffective disorder  Prozac 120 mg daily for depressive symptoms   Ativan 0.5 mg twice daily for anxiety and paranoia  Melatonin 3 mg nightly for insomnia  Prazosin 2 mg nightly for PTSD associated nightmares   trazodone 50 mg nightly for insomnia    No associated orders from this encounter found during lookback period of 72 hours.        Plan   Progress Toward Goals:   Meli is progressing towards goals of inpatient psychiatric treatment by continued medication compliance and is attending therapeutic modalities on the milieu. However, the patient continues to require inpatient psychiatric hospitalization for continued medication management and titration to optimize symptom reduction, improve sleep hygiene, and demonstrate adequate self-care.    Recommended Treatment: Treatment plan and medication changes discussed and per the attending physician the plan is:    1.Continue with group therapy, milieu therapy and occupational therapy  2.Behavioral Health checks every 7 minutes  3.Continue frequent safety checks and vitals per unit protocol  4.Continue with SLIM medical management as indicated  5.Continue with current medication regimen  6.Will review labs in the a.m.  7.Disposition Planning: Discharge planning and efforts remain ongoing    Behavioral Health Medications: all current active meds have been reviewed and continue current psychiatric medications.  Current Facility-Administered Medications   Medication Dose Route Frequency Provider Last Rate    acetaminophen  650 mg Oral Q4H PRN JOSE ELIAS Figueroa      acetaminophen  650 mg Oral Q4H  PRN Marie Ziegler, CRNP      acetaminophen  975 mg Oral Q6H PRN Marie Ziegler, CRNP      aluminum-magnesium hydroxide-simethicone  30 mL Oral Q4H PRN Parris Bolanos, CRNP      Artificial Tears  1 drop Both Eyes Q6H PRN Dereje Banuelos MD      atorvastatin  10 mg Oral Daily Marie Ziegler, CRNP      bisacodyl  10 mg Rectal Daily PRN Marie Ziegler, CRNP      carvedilol  6.25 mg Oral BID With Meals Marie Ziegler, CRNP      cloZAPine  650 mg Oral HS Marie Ziegler, CRNP      cyanocobalamin  1,000 mcg Oral Daily Marie Ziegler, CRNP      famotidine  20 mg Oral BID Shan Fitzgerald, DO      FLUoxetine  120 mg Oral Daily Marie Ziegler, CRNP      fluticasone  1 puff Inhalation Daily Marie Ziegler, CRNP      hydrOXYzine HCL  25 mg Oral Q6H PRN Max 4/day Marie Ziegler, CRNP      hydrOXYzine HCL  50 mg Oral Q6H PRN Max 4/day Marie Ziegler, CRNP      ipratropium-albuterol  3 mL Nebulization Q4H PRN Darin Lawton MD      LORazepam  0.5 mg Oral BID Marie Ziegler, CRNP      Or    LORazepam  0.5 mg Intramuscular BID Marie Ziegler, CRNP      LORazepam  1 mg Intramuscular Q6H PRN Max 3/day Marie Ziegler, CRNP      LORazepam  1 mg Oral Q6H PRN Max 3/day Marie Ziegler, CRNP      melatonin  3 mg Oral HS Marie Ziegler, CRNP      mirtazapine  7.5 mg Oral HS PRN Marie Ziegler, CRNP      Multivitamin  15 mL Oral Daily Marie Ziegler, CRNP      nicotine  1 patch Transdermal Daily Marie Ziegler, CRNP      OLANZapine  2.5 mg Oral Q6H PRN Dereje Banuelos MD      Or    OLANZapine  2.5 mg Intramuscular Q6H PRN Dereje Banuelos MD      OLANZapine  5 mg Oral Q6H PRN Dereje Banuelos MD      Or    OLANZapine  5 mg Intramuscular Q6H PRN Dereje Banuelos MD      ondansetron  4 mg Oral Q6H PRN Darin Lawton MD      polyethylene glycol  17 g Oral Daily PRN Marie Ziegler, CRNP      polyethylene glycol  17 g Oral Daily Kristen Logan, CRNP      prazosin  2 mg Oral HS Marie Ziegler, CRVALE      senna-docusate  "sodium  2 tablet Oral HS Rehana Borrego PA-C      traZODone  50 mg Oral HS JOSE ELIAS Figueroa         Risks / Benefits of Treatment:  Risks, benefits, and possible side effects of medications explained to patient and patient verbalizes understanding and agreement for treatment.       Subjective    Patient was seen today for continuation of care, records reviewed and patient was discussed with the morning case review team.    Meli was seen today for psychiatric follow-up.  On assessment today, Meli was seen in her room.  Preoccupied with wearing make-up today, she continues to be evasive at times.  Question about auditory hallucinations, this writer reminded her of her laughing spells including her statements that they were telling her jokes.  She then became quiet and stated\" I told you that, is that okay?\".  Endorsing that they do speak to her at times, she does confirm after further questioning that they are not bothersome and make her happy.  Paranoia still noted, we will continue with Clozaril 650 mg nightly and obtain weekly labs in addition to Clozaril level on 9/23/2024 as previous level 541.  No medication changes to be made at this time, patient continues to await CRR placement.    Meli denies acute suicidal/self-harm ideation/intent/plan upon direct inquiry at this time.  Meli remains behaviorally appropriate, no agitation or aggression noted on exam or in report.  Impulse control is intact.  Meli remains adherent to her current psychotropic medication regimen and denies any side effects from medications, as well as none noted on exam.    Psychiatric Review of Systems:  Behavior over the last 24 hours:  unchanged.   Sleep: good  Appetite: good  Medication side effects: none  ROS: no complaints, denies shortness of breath or chest pain and all other systems are negative for acute changes        Objective    Vitals:  Vitals:    09/18/24 0736   BP: 151/88   Pulse: 91   Resp: 16   Temp: 97.5 °F (36.4 " °C)   SpO2: 91%       Laboratory Results:  I have personally reviewed all pertinent laboratory/tests results.    Mental Status Evaluation:  Appearance:  marginal hygiene, improved grooming, overweight   Behavior:  cooperative, bizarre, guarded   Speech:  normal rate and volume   Mood:  less anxious   Affect:  constricted   Thought Process:  linear, perseverative, concrete   Thought Content:  paranoid ideation, negative thoughts   Perceptual Disturbances: auditory hallucinations still present, but less frequent   Risk Potential: Suicidal ideation - None  Homicidal ideation - None  Potential for aggression - No   Memory:  recent and remote memory grossly intact   Sensorium  person, place, and time/date      Consciousness:  alert and awake   Attention: attention span and concentration are age appropriate   Insight:  limited   Judgment: limited   Gait/Station: normal gait/station   Motor Activity: no abnormal movements       Counseling / Coordination of Care:  Patient's progress reviewed with nursing staff.  Medications, treatment progress and treatment plan reviewed with patient.  Supportive counseling provided to the patient.    This note was completed in part utilizing Dragon dictation Software. Grammatical, translation, syntax errors, random word insertions, spelling mistakes, and incomplete sentences may be an occasional consequence of this system secondary to software limitations with voice recognition, ambient noise, and hardware issues. If you have any questions or concerns about the content, text, or information contained within the body of this dictation, please contact the provider for clarification

## 2024-09-18 NOTE — PLAN OF CARE
Problem: Prexisting or High Potential for Compromised Skin Integrity  Goal: Skin integrity is maintained or improved  Description: INTERVENTIONS:  - Identify patients at risk for skin breakdown  - Assess and monitor skin integrity  - Assess and monitor nutrition and hydration status  - Monitor labs   - Assess for incontinence   - Turn and reposition patient  - Assist with mobility/ambulation  - Relieve pressure over bony prominences  - Avoid friction and shearing  - Provide appropriate hygiene as needed including keeping skin clean and dry  - Evaluate need for skin moisturizer/barrier cream  - Collaborate with interdisciplinary team   - Patient/family teaching  - Consider wound care consult   Outcome: Progressing     Problem: Alteration in Thoughts and Perception  Goal: Agree to be compliant with medication regime, as prescribed and report medication side effects  Description: Interventions:  - Offer appropriate PRN medication and supervise ingestion; conduct AIMS, as needed   Outcome: Progressing     Problem: Ineffective Coping  Goal: Free from restraint events  Description: - Utilize least restrictive measures   - Provide behavioral interventions   - Redirect inappropriate behaviors   Outcome: Progressing     Problem: Risk for Self Injury/Neglect  Goal: Refrain from harming self  Description: Interventions:  - Monitor patient closely, per order  - Develop a trusting relationship  - Supervise medication ingestion, monitor effects and side effects   Outcome: Progressing     Problem: Depression  Goal: Treatment Goal: Demonstrate behavioral control of depressive symptoms, verbalize feelings of improved mood/affect, and adopt new coping skills prior to discharge  Outcome: Progressing     Problem: Anxiety  Goal: Anxiety is at manageable level  Description: Interventions:  - Assess and monitor patient's anxiety level.   - Monitor for signs and symptoms (heart palpitations, chest pain, shortness of breath, headaches,  nausea, feeling jumpy, restlessness, irritable, apprehensive).   - Collaborate with interdisciplinary team and initiate plan and interventions as ordered.  - Doyle patient to unit/surroundings  - Explain treatment plan  - Encourage participation in care  - Encourage verbalization of concerns/fears  - Identify coping mechanisms  - Assist in developing anxiety-reducing skills  - Administer/offer alternative therapies  - Limit or eliminate stimulants  Outcome: Progressing     Problem: SAFETY,RESTRAINT: NV/NON-SELF DESTRUCTIVE BEHAVIOR  Goal: Remains free of harm/injury (restraint for non violent/non self-detsructive behavior)  Description: INTERVENTIONS:  - Instruct patient/family regarding restraint use   - Assess and monitor physiologic and psychological status   - Provide interventions and comfort measures to meet assessed patient needs   - Identify and implement measures to help patient regain control  - Assess readiness for release of restraint   Outcome: Progressing     Problem: Potential for Falls  Goal: Patient will remain free of falls  Description: INTERVENTIONS:  - Educate patient on patient safety including physical limitations  - Instruct patient to call for assistance with activity   - Consult OT/PT to assist with strengthening/mobility   - Keep Call bell within reach  - Keep bed low and locked with side rails adjusted as appropriate  - Keep care items and personal belongings within reach  - Initiate and maintain comfort rounds  - Offer Toileting every 2 Hours, in advance of need  - Initiate/Maintain bed and chair alarm  - Obtain necessary fall risk management equipment: walker, wheelchair  - Apply yellow socks and bracelet for high fall risk patients  - Patient moved to room near nurses station  Outcome: Progressing

## 2024-09-18 NOTE — PROGRESS NOTES
09/18/24 0734   Team Meeting   Meeting Type Daily Rounds   Initial Conference Date 09/18/24   Next Conference Date 09/19/24   Team Members Present   Team Members Present Physician;Nurse;;   Physician Team Member Dr Banuelos, Dr Alberto, JAIR Romero   Nursing Team Member Clarissa   Care Management Team Member Anum   Social Work Team Member Ayse   Patient/Family Present   Patient Present No   Patient's Family Present No     Pending CRR placement, bed alarms at night, no groups yesterday, manic with bizarre behaviors, religiously preoccupied, Clozaril level on 9/23, increased Clozaril to 650.

## 2024-09-18 NOTE — PROGRESS NOTES
09/18/24 1100   Activity/Group Checklist   Group Life Skills  (vitality topic)   Attendance Attended   Attendance Duration (min) 31-45   Interactions Disorganized interaction  (pt. repeatedly requested that music be played in the group.Pt. was redirected yet not interested in the topic discussion.)   Affect/Mood Incongruent   Goals Achieved Able to listen to others

## 2024-09-18 NOTE — NURSING NOTE
"Patient is visible on the unit at times. She has a disorganized thought process and bizarre during conversations. Patient expresses wanting Noxzema to wash her face and states \"I'm probably not going to eat tomorrow so I don't gain anymore weight. I can't get a boyfriend looking like this.\" Patient encouraged to focus on bettering her mental health instead of obtaining a relationship. She is medication compliant. Encouraged to inform staff of any needs or concerns.     "

## 2024-09-18 NOTE — NURSING NOTE
Pt anxious with guarded affect but med-compliant with good appetite and steady gait.  VSS.  Did not attend group.  Monitored for safety and support.

## 2024-09-19 PROCEDURE — 99232 SBSQ HOSP IP/OBS MODERATE 35: CPT | Performed by: STUDENT IN AN ORGANIZED HEALTH CARE EDUCATION/TRAINING PROGRAM

## 2024-09-19 RX ADMIN — ATORVASTATIN CALCIUM 10 MG: 10 TABLET, FILM COATED ORAL at 08:04

## 2024-09-19 RX ADMIN — CARVEDILOL 6.25 MG: 6.25 TABLET, FILM COATED ORAL at 08:04

## 2024-09-19 RX ADMIN — POLYETHYLENE GLYCOL 3350 17 G: 17 POWDER, FOR SOLUTION ORAL at 08:04

## 2024-09-19 RX ADMIN — POLYETHYLENE GLYCOL 3350 17 G: 17 POWDER, FOR SOLUTION ORAL at 15:04

## 2024-09-19 RX ADMIN — FLUTICASONE FUROATE 1 PUFF: 100 POWDER RESPIRATORY (INHALATION) at 08:05

## 2024-09-19 RX ADMIN — TRAZODONE HYDROCHLORIDE 50 MG: 50 TABLET ORAL at 21:28

## 2024-09-19 RX ADMIN — LORAZEPAM 0.5 MG: 0.5 TABLET ORAL at 17:14

## 2024-09-19 RX ADMIN — CLOZAPINE 650 MG: 25 TABLET ORAL at 21:28

## 2024-09-19 RX ADMIN — MELATONIN TAB 3 MG 3 MG: 3 TAB at 21:29

## 2024-09-19 RX ADMIN — PRAZOSIN HYDROCHLORIDE 2 MG: 1 CAPSULE ORAL at 21:28

## 2024-09-19 RX ADMIN — FLUOXETINE 120 MG: 20 SOLUTION ORAL at 08:05

## 2024-09-19 RX ADMIN — NICOTINE 1 PATCH: 7 PATCH, EXTENDED RELEASE TRANSDERMAL at 08:05

## 2024-09-19 RX ADMIN — Medication 15 ML: at 08:05

## 2024-09-19 RX ADMIN — FAMOTIDINE 20 MG: 20 TABLET ORAL at 17:14

## 2024-09-19 RX ADMIN — SENNOSIDES AND DOCUSATE SODIUM 2 TABLET: 8.6; 5 TABLET ORAL at 21:28

## 2024-09-19 RX ADMIN — FAMOTIDINE 20 MG: 20 TABLET ORAL at 08:04

## 2024-09-19 RX ADMIN — CARVEDILOL 6.25 MG: 6.25 TABLET, FILM COATED ORAL at 17:14

## 2024-09-19 RX ADMIN — LORAZEPAM 0.5 MG: 0.5 TABLET ORAL at 08:04

## 2024-09-19 RX ADMIN — CYANOCOBALAMIN TAB 1000 MCG 1000 MCG: 1000 TAB at 08:04

## 2024-09-19 NOTE — PROGRESS NOTES
09/19/24 0809   Team Meeting   Meeting Type Daily Rounds   Initial Conference Date 09/19/24   Next Conference Date 09/20/24   Team Members Present   Team Members Present Physician;Nurse;;   Physician Team Member Dr Banuelos, Dr Alberto, JAIR Romero   Nursing Team Member Dona Romo   Care Management Team Member Anum   Social Work Team Member Ayse   Patient/Family Present   Patient Present No   Patient's Family Present No     Discharge pending placement in a CRR, Clozaril level 9/23, somatic, 100% meals, attended 2 groups, takes meds, slept.

## 2024-09-19 NOTE — NURSING NOTE
Pt guarded but pleasant and med-compliant.  Attended group.  VSS.  Denied AH with no RIS noted.  Monitored for safety and support.

## 2024-09-19 NOTE — TREATMENT TEAM
09/18/24 4238   Pain Assessment   Pain Assessment Tool 0-10   Pain Score 8   Pain Location/Orientation Location: Chest;Location: Generalized   Hospital Pain Intervention(s) Medication (See MAR)     Tylenol administered for c/o chest pain and generalized pain.

## 2024-09-19 NOTE — TREATMENT TEAM
09/18/24 0635   Pain Assessment   Pain Assessment Tool FLACC   Pain Rating: FLACC (Rest) - Face 0   Pain Rating: FLACC (Rest) - Legs 0   Pain Rating: FLACC (Rest) - Activity 0   Pain Rating: FLACC (Rest) - Cry 0   Pain Rating: FLACC (Rest) - Consolability 0   Score: FLACC (Rest) 0   Pain Assessment Post Intervention   Pain Assessment Tool Used: FLACC   Post Intervention Pain Score No Pain   Post Intervention Pain Location/Orientation Location: Chest;Location: Generalized   Response to Interventions Appears effective     PRN Tylenol appears effective.

## 2024-09-19 NOTE — PLAN OF CARE
Problem: DISCHARGE PLANNING - CARE MANAGEMENT  Goal: Discharge to post-acute care or home with appropriate resources  Description: INTERVENTIONS:  - Conduct assessment to determine patient/family and health care team treatment goals, and need for post-acute services based on payer coverage, community resources, and patient preferences, and barriers to discharge  - Address psychosocial, clinical, and financial barriers to discharge as identified in assessment in conjunction with the patient/family and health care team  - Arrange appropriate level of post-acute services according to patient’s   needs and preference and payer coverage in collaboration with the physician and health care team  - Communicate with and update the patient/family, physician, and health care team regarding progress on the discharge plan  - Arrange appropriate transportation to post-acute venues  Outcome: Progressing     Problem: Prexisting or High Potential for Compromised Skin Integrity  Goal: Skin integrity is maintained or improved  Description: INTERVENTIONS:  - Identify patients at risk for skin breakdown  - Assess and monitor skin integrity  - Assess and monitor nutrition and hydration status  - Monitor labs   - Assess for incontinence   - Turn and reposition patient  - Assist with mobility/ambulation  - Relieve pressure over bony prominences  - Avoid friction and shearing  - Provide appropriate hygiene as needed including keeping skin clean and dry  - Evaluate need for skin moisturizer/barrier cream  - Collaborate with interdisciplinary team   - Patient/family teaching  - Consider wound care consult   Outcome: Progressing     Problem: Alteration in Thoughts and Perception  Goal: Treatment Goal: Gain control of psychotic behaviors/thinking, reduce/eliminate presenting symptoms and demonstrate improved reality functioning upon discharge  Outcome: Progressing  Goal: Verbalize thoughts and feelings  Description: Interventions:  - Promote  a nonjudgmental and trusting relationship with the patient through active listening and therapeutic communication  - Assess patient's level of functioning, behavior and potential for risk  - Engage patient in 1 on 1 interactions  - Encourage patient to express fears, feelings, frustrations, and discuss symptoms    - [place] patient to reality, help patient recognize reality-based thinking   - Administer medications as ordered and assess for potential side effects  - Provide the patient education related to the signs and symptoms of the illness and desired effects of prescribed medications  Outcome: Progressing  Goal: Refrain from acting on delusional thinking/internal stimuli  Description: Interventions:  - Monitor patient closely, per order   - Utilize least restrictive measures   - Set reasonable limits, give positive feedback for acceptable   - Administer medications as ordered and monitor of potential side effects  Outcome: Progressing  Goal: Agree to be compliant with medication regime, as prescribed and report medication side effects  Description: Interventions:  - Offer appropriate PRN medication and supervise ingestion; conduct AIMS, as needed   Outcome: Progressing  Goal: Attend and participate in unit activities, including therapeutic, recreational, and educational groups  Description: Interventions:  -Encourage Visitation and family involvement in care  Outcome: Progressing  Goal: Recognize dysfunctional thoughts, communicate reality-based thoughts at the time of discharge  Description: Interventions:  - Provide medication and psycho-education to assist patient in compliance and developing insight into his/her illness   Outcome: Progressing  Goal: Complete daily ADLs, including personal hygiene independently, as able  Description: Interventions:  - Observe, teach, and assist patient with ADLS  - Monitor and promote a balance of rest/activity, with adequate nutrition and elimination   Outcome: Progressing      Problem: Ineffective Coping  Goal: Identifies ineffective coping skills  Outcome: Progressing  Goal: Demonstrates healthy coping skills  Outcome: Progressing  Goal: Participates in unit activities  Description: Interventions:  - Provide therapeutic environment   - Provide required programming   - Redirect inappropriate behaviors   Outcome: Progressing  Goal: Patient/Family participate in treatment and DC plans  Description: Interventions:  - Provide therapeutic environment  Outcome: Progressing  Goal: Patient/Family verbalizes awareness of resources  Outcome: Progressing  Goal: Understands least restrictive measures  Description: Interventions:  - Utilize least restrictive behavior  Outcome: Progressing  Goal: Free from restraint events  Description: - Utilize least restrictive measures   - Provide behavioral interventions   - Redirect inappropriate behaviors   Outcome: Progressing     Problem: Risk for Self Injury/Neglect  Goal: Treatment Goal: Remain safe during length of stay, learn and adopt new coping skills, and be free of self-injurious ideation, impulses and acts at the time of discharge  Outcome: Progressing  Goal: Verbalize thoughts and feelings  Description: Interventions:  - Assess and re-assess patient's lethality and potential for self-injury  - Engage patient in 1:1 interactions, daily, for a minimum of 15 minutes  - Encourage patient to express feelings, fears, frustrations, hopes  - Establish rapport/trust with patient   Outcome: Progressing  Goal: Refrain from harming self  Description: Interventions:  - Monitor patient closely, per order  - Develop a trusting relationship  - Supervise medication ingestion, monitor effects and side effects   Outcome: Progressing  Goal: Attend and participate in unit activities, including therapeutic, recreational, and educational groups  Description: Interventions:  - Provide therapeutic and educational activities daily, encourage attendance and participation,  and document same in the medical record  - Obtain collateral information, encourage visitation and family involvement in care   Outcome: Progressing  Goal: Recognize maladaptive responses and adopt new coping mechanisms  Outcome: Progressing     Problem: Depression  Goal: Treatment Goal: Demonstrate behavioral control of depressive symptoms, verbalize feelings of improved mood/affect, and adopt new coping skills prior to discharge  Outcome: Progressing  Goal: Verbalize thoughts and feelings  Description: Interventions:  - Assess and re-assess patient's level of risk   - Engage patient in 1:1 interactions, daily, for a minimum of 15 minutes   - Encourage patient to express feelings, fears, frustrations, hopes   Outcome: Progressing  Goal: Refrain from isolation  Description: Interventions:  - Develop a trusting relationship   - Encourage socialization   Outcome: Progressing  Goal: Refrain from self-neglect  Outcome: Progressing  Goal: Attend and participate in unit activities, including therapeutic, recreational, and educational groups  Description: Interventions:  - Provide therapeutic and educational activities daily, encourage attendance and participation, and document same in the medical record   Outcome: Progressing     Problem: Anxiety  Goal: Anxiety is at manageable level  Description: Interventions:  - Assess and monitor patient's anxiety level.   - Monitor for signs and symptoms (heart palpitations, chest pain, shortness of breath, headaches, nausea, feeling jumpy, restlessness, irritable, apprehensive).   - Collaborate with interdisciplinary team and initiate plan and interventions as ordered.  - [place] patient to unit/surroundings  - Explain treatment plan  - Encourage participation in care  - Encourage verbalization of concerns/fears  - Identify coping mechanisms  - Assist in developing anxiety-reducing skills  - Administer/offer alternative therapies  - Limit or eliminate stimulants  Outcome:  Progressing     Problem: SAFETY,RESTRAINT: NV/NON-SELF DESTRUCTIVE BEHAVIOR  Goal: Remains free of harm/injury (restraint for non violent/non self-detsructive behavior)  Description: INTERVENTIONS:  - Instruct patient/family regarding restraint use   - Assess and monitor physiologic and psychological status   - Provide interventions and comfort measures to meet assessed patient needs   - Identify and implement measures to help patient regain control  - Assess readiness for release of restraint   Outcome: Progressing  Goal: Returns to optimal restraint-free functioning  Description: INTERVENTIONS:  - Assess the patient's behavior and symptoms that indicate continued need for restraint  - Identify and implement measures to help patient regain control  - Assess readiness for release of restraint   Outcome: Progressing     Problem: Potential for Falls  Goal: Patient will remain free of falls  Description: INTERVENTIONS:  - Educate patient on patient safety including physical limitations  - Instruct patient to call for assistance with activity   - Consult OT/PT to assist with strengthening/mobility   - Keep Call bell within reach  - Keep bed low and locked with side rails adjusted as appropriate  - Keep care items and personal belongings within reach  - Initiate and maintain comfort rounds  - Offer Toileting every 2 Hours, in advance of need  - Initiate/Maintain bed and chair alarm  - Obtain necessary fall risk management equipment: walker, wheelchair  - Apply yellow socks and bracelet for high fall risk patients  - Patient moved to room near nurses station  Outcome: Progressing     Problem: Nutrition/Hydration-ADULT  Goal: Nutrient/Hydration intake appropriate for improving, restoring or maintaining nutritional needs  Description: Monitor and assess patient's nutrition/hydration status for malnutrition. Collaborate with interdisciplinary team and initiate plan and interventions as ordered.  Monitor patient's weight and  dietary intake as ordered or per policy. Utilize nutrition screening tool and intervene as necessary. Determine patient's food preferences and provide high-protein, high-caloric foods as appropriate.     INTERVENTIONS:  - Monitor oral intake, urinary output, labs, and treatment plans  - Assess nutrition and hydration status and recommend course of action  - Evaluate amount of meals eaten  - Assist patient with eating if necessary   - Allow adequate time for meals  - Recommend/ encourage appropriate diets, oral nutritional supplements, and vitamin/mineral supplements  - Order, calculate, and assess calorie counts as needed  - Recommend, monitor, and adjust tube feedings and TPN/PPN based on assessed needs  - Assess need for intravenous fluids  - Provide specific nutrition/hydration education as appropriate  - Include patient/family/caregiver in decisions related to nutrition  Outcome: Progressing

## 2024-09-19 NOTE — PROGRESS NOTES
09/19/24 1330   Activity/Group Checklist   Group Self Esteem  (gratitidue task.)   Attendance Attended   Attendance Duration (min) 46-60   Interactions Disorganized interaction  (pt. was able to make some statements about personal gratitude yet focused on getting music to listen to and by end of session became hyperverbal.)   Affect/Mood Wide   Goals Achieved Able to engage in interactions

## 2024-09-19 NOTE — CASE MANAGEMENT
Cm met with pt, reviewed CRR referral options and benefits. Pt reports wanting to return to pt's apt and in agreement with CRR referral. Pt signed CHANCE for [facility] for funding referral. Pt signed [facility] for [facility] referrals. Pt remains disorganized; tangential during conversation. Pt focused on obtaining chinese food at CRR and in hospital. Pt expressed concerns about pt's cat and taking cat to CRR. Pt also expressed concerns for size of home and requested to know if CRR was same size of hospital. Pt required redirection to focus on conversation and responded well to redirection.

## 2024-09-19 NOTE — NURSING NOTE
Patient is alert and oriented able to make her needs known. Patient is isolative to self in room. Patient is medication and meal compliant. She endorses mild anxiety and denies all other psych and s/s. Safety checks ongoing.

## 2024-09-19 NOTE — PROGRESS NOTES
Progress Note - Behavioral Health   Name: Meli Nowak 59 y.o. female I MRN: [id number]  Unit/Bed#: [facility] 651-01 I Date of Admission: 7/23/2024   Date of Service: 9/19/2024 I Hospital Day: 58     Assessment & Plan  Schizoaffective disorder, bipolar type (HCC)  Progress towards goals  Awaiting CRR placement  Continue medications as prescribed  Clozaril 650 mg nightly for schizoaffective disorder  Prozac 120 mg daily for depressive symptoms   Ativan 0.5 mg twice daily for anxiety and paranoia  Melatonin 3 mg nightly for insomnia  Prazosin 2 mg nightly for PTSD associated nightmares   trazodone 50 mg nightly for insomnia    No associated orders from this encounter found during lookback period of 72 hours.        Plan   Progress Toward Goals:   Meli is progressing towards goals of inpatient psychiatric treatment by continued medication compliance and is attending therapeutic modalities on the milieu. However, the patient continues to require inpatient psychiatric hospitalization for continued medication management and titration to optimize symptom reduction, improve sleep hygiene, and demonstrate adequate self-care.    Recommended Treatment: Treatment plan and medication changes discussed and per the attending physician the plan is:    1.Continue with group therapy, milieu therapy and occupational therapy  2.Behavioral Health checks every 7 minutes  3.Continue frequent safety checks and vitals per unit protocol  4.Continue with SLIM medical management as indicated  5.Continue with current medication regimen  6.Will review labs in the a.m.  7.Disposition Planning: Discharge planning and efforts remain ongoing    Behavioral Health Medications: all current active meds have been reviewed and continue current psychiatric medications.  Current Facility-Administered Medications   Medication Dose Route Frequency Provider Last Rate    acetaminophen  650 mg Oral Q4H PRN JOSE ELIAS Figueroa      acetaminophen  650 mg Oral Q4H  PRN Marie Ziegler, CRNP      acetaminophen  975 mg Oral Q6H PRN Marie Ziegler, CRNP      aluminum-magnesium hydroxide-simethicone  30 mL Oral Q4H PRN Parris Bolanos, CRNP      Artificial Tears  1 drop Both Eyes Q6H PRN Dereje Banuelos MD      atorvastatin  10 mg Oral Daily Marie Ziegler, CRNP      bisacodyl  10 mg Rectal Daily PRN Marie Ziegler, CRNP      carvedilol  6.25 mg Oral BID With Meals Marie Ziegler, CRNP      cloZAPine  650 mg Oral HS Marie Ziegler, CRNP      cyanocobalamin  1,000 mcg Oral Daily Marie Ziegler, CRNP      famotidine  20 mg Oral BID Shan Fitzgerald, DO      FLUoxetine  120 mg Oral Daily Marie Ziegler, CRNP      fluticasone  1 puff Inhalation Daily Marie Ziegler, CRNP      hydrOXYzine HCL  25 mg Oral Q6H PRN Max 4/day Marie Ziegler, CRNP      hydrOXYzine HCL  50 mg Oral Q6H PRN Max 4/day Marie Ziegler, CRNP      ipratropium-albuterol  3 mL Nebulization Q4H PRN Darin Lawton MD      LORazepam  0.5 mg Oral BID Marie Ziegler, CRNP      Or    LORazepam  0.5 mg Intramuscular BID Marie Ziegler, CRNP      LORazepam  1 mg Intramuscular Q6H PRN Max 3/day Marie Ziegler, CRNP      LORazepam  1 mg Oral Q6H PRN Max 3/day Marie Ziegler, CRNP      melatonin  3 mg Oral HS Marie Ziegler, CRNP      mirtazapine  7.5 mg Oral HS PRN Marie Ziegler, CRNP      Multivitamin  15 mL Oral Daily Marie Ziegler, CRNP      nicotine  1 patch Transdermal Daily Marie Ziegler, CRNP      OLANZapine  2.5 mg Oral Q6H PRN Dereje Banuelos MD      Or    OLANZapine  2.5 mg Intramuscular Q6H PRN Dereje Banuelos MD      OLANZapine  5 mg Oral Q6H PRN Dereje Banuelos MD      Or    OLANZapine  5 mg Intramuscular Q6H PRN Dereje Banuelos MD      ondansetron  4 mg Oral Q6H PRN Darin Lawton MD      polyethylene glycol  17 g Oral Daily PRN Marie Ziegler, CRNP      polyethylene glycol  17 g Oral Daily Kristen Logan, CRNP      prazosin  2 mg Oral HS Marie Ziegler, CRVALE      senna-docusate  sodium  2 tablet Oral HS Rehana Borrego PA-C      traZODone  50 mg Oral HS JOSE ELIAS Figueroa         Risks / Benefits of Treatment:  Risks, benefits, and possible side effects of medications explained to patient and patient verbalizes understanding and agreement for treatment.       Subjective    Patient was seen today for continuation of care, records reviewed and patient was discussed with the morning case review team.    Meli was seen today for psychiatric follow-up.  On assessment today, Meli was visible on the unit and socializing with peers.  Preoccupied with her appearance today, patient is requesting that this writer bring in make-up and earrings for her.  Advised that we have reached out to her , patient is redirectable.  Still paranoid she is also somatic and religiously preoccupied, but Meli continues to make small improvements towards her baseline.  Clozaril currently prescribed at 650 mg nightly, we will obtain level on 9/23/2024.  Weekly labs also continue to be monitored q. Monday.  Patient is noted to be more compliant with regimen and has not required implementation of medication over objection.  Tentative discharge ongoing pending CRR placement.    Meli denies acute suicidal/self-harm ideation/intent/plan upon direct inquiry at this time.  Meli remains behaviorally appropriate, no agitation or aggression noted on exam or in report.   Impulse control is intact.  Meli remains adherent to her current psychotropic medication regimen and denies any side effects from medications, as well as none noted on exam.    Psychiatric Review of Systems:  Behavior over the last 24 hours:  unchanged.   Sleep: good  Appetite: good  Medication side effects: none  ROS: no complaints, denies shortness of breath or chest pain and all other systems are negative for acute changes        Objective    Vitals:  Vitals:    09/19/24 0900   BP:    Pulse:    Resp:    Temp: 97.5 °F (36.4 °C)   SpO2:         Laboratory Results:  I have personally reviewed all pertinent laboratory/tests results.    Mental Status Evaluation:  Appearance:  dressed appropriately, improved grooming   Behavior:  cooperative, bizarre, guarded   Speech:  normal rate and volume   Mood:  less anxious   Affect:  constricted, slightly brighter   Thought Process:  illogical, perseverative, negative thinking, concrete   Thought Content:  Religion and somatic preoccupation, paranoid ideation, negative thoughts, ruminating thoughts   Perceptual Disturbances: talks to self at times, denies when asked, but talks to self at times   Risk Potential: Suicidal ideation - None  Homicidal ideation - None  Potential for aggression - No   Memory:  recent and remote memory grossly intact   Sensorium  person, place, and time/date      Consciousness:  alert and awake   Attention: attention span and concentration are age appropriate   Insight:  limited   Judgment: limited   Gait/Station: normal gait/station   Motor Activity: no abnormal movements       Counseling / Coordination of Care:  Patient's progress reviewed with nursing staff.  Medications, treatment progress and treatment plan reviewed with patient.  Supportive counseling provided to the patient.    This note was completed in part utilizing Dragon dictation Software. Grammatical, translation, syntax errors, random word insertions, spelling mistakes, and incomplete sentences may be an occasional consequence of this system secondary to software limitations with voice recognition, ambient noise, and hardware issues. If you have any questions or concerns about the content, text, or information contained within the body of this dictation, please contact the provider for clarification

## 2024-09-20 LAB
ALBUMIN SERPL BCG-MCNC: 4.2 G/DL (ref 3.5–5)
ALP SERPL-CCNC: 96 U/L (ref 34–104)
ALT SERPL W P-5'-P-CCNC: 30 U/L (ref 7–52)
ANION GAP SERPL CALCULATED.3IONS-SCNC: 9 MMOL/L (ref 4–13)
AST SERPL W P-5'-P-CCNC: 21 U/L (ref 13–39)
BILIRUB SERPL-MCNC: 0.34 MG/DL (ref 0.2–1)
BUN SERPL-MCNC: 17 MG/DL (ref 5–25)
CALCIUM SERPL-MCNC: 9.6 MG/DL (ref 8.4–10.2)
CHLORIDE SERPL-SCNC: 101 MMOL/L (ref 96–108)
CO2 SERPL-SCNC: 26 MMOL/L (ref 21–32)
CREAT SERPL-MCNC: 0.86 MG/DL (ref 0.6–1.3)
GFR SERPL CREATININE-BSD FRML MDRD: 75 ML/MIN/1.73SQ M
GLUCOSE SERPL-MCNC: 198 MG/DL (ref 65–140)
POTASSIUM SERPL-SCNC: 3.9 MMOL/L (ref 3.5–5.3)
PROT SERPL-MCNC: 7.2 G/DL (ref 6.4–8.4)
SODIUM SERPL-SCNC: 136 MMOL/L (ref 135–147)

## 2024-09-20 PROCEDURE — 99232 SBSQ HOSP IP/OBS MODERATE 35: CPT | Performed by: STUDENT IN AN ORGANIZED HEALTH CARE EDUCATION/TRAINING PROGRAM

## 2024-09-20 PROCEDURE — 80053 COMPREHEN METABOLIC PANEL: CPT

## 2024-09-20 RX ADMIN — FLUTICASONE FUROATE 1 PUFF: 100 POWDER RESPIRATORY (INHALATION) at 08:05

## 2024-09-20 RX ADMIN — LORAZEPAM 0.5 MG: 0.5 TABLET ORAL at 08:03

## 2024-09-20 RX ADMIN — ATORVASTATIN CALCIUM 10 MG: 10 TABLET, FILM COATED ORAL at 08:04

## 2024-09-20 RX ADMIN — SENNOSIDES AND DOCUSATE SODIUM 2 TABLET: 8.6; 5 TABLET ORAL at 21:08

## 2024-09-20 RX ADMIN — CARVEDILOL 6.25 MG: 6.25 TABLET, FILM COATED ORAL at 17:11

## 2024-09-20 RX ADMIN — Medication 15 ML: at 08:04

## 2024-09-20 RX ADMIN — OLANZAPINE 5 MG: 5 TABLET, FILM COATED ORAL at 05:51

## 2024-09-20 RX ADMIN — CLOZAPINE 650 MG: 25 TABLET ORAL at 21:08

## 2024-09-20 RX ADMIN — FAMOTIDINE 20 MG: 20 TABLET ORAL at 17:11

## 2024-09-20 RX ADMIN — FAMOTIDINE 20 MG: 20 TABLET ORAL at 08:04

## 2024-09-20 RX ADMIN — FLUOXETINE 120 MG: 20 SOLUTION ORAL at 08:04

## 2024-09-20 RX ADMIN — CYANOCOBALAMIN TAB 1000 MCG 1000 MCG: 1000 TAB at 08:04

## 2024-09-20 RX ADMIN — TRAZODONE HYDROCHLORIDE 50 MG: 50 TABLET ORAL at 21:08

## 2024-09-20 RX ADMIN — CARVEDILOL 6.25 MG: 6.25 TABLET, FILM COATED ORAL at 08:03

## 2024-09-20 RX ADMIN — POLYETHYLENE GLYCOL 3350 17 G: 17 POWDER, FOR SOLUTION ORAL at 08:04

## 2024-09-20 RX ADMIN — NICOTINE 1 PATCH: 7 PATCH, EXTENDED RELEASE TRANSDERMAL at 08:05

## 2024-09-20 RX ADMIN — LORAZEPAM 0.5 MG: 0.5 TABLET ORAL at 17:11

## 2024-09-20 RX ADMIN — PRAZOSIN HYDROCHLORIDE 2 MG: 1 CAPSULE ORAL at 21:08

## 2024-09-20 RX ADMIN — MELATONIN TAB 3 MG 3 MG: 3 TAB at 21:08

## 2024-09-20 NOTE — TREATMENT PLAN
TREATMENT PLAN REVIEW - Behavioral Health Meli Nowak 59 y.o. 1965 female MRN: [id number]    [facility] SH 6B [facility] Room / Bed: [facility] 651/[facility] 651-01 Encounter: [id number]          Admit Date/Time:  7/23/2024  1:27 PM    Treatment Team:   MD Marie Carter CRNP Julia Miller, GARIMA Boyle, BERKLEY French    Diagnosis: Principal Problem:    Schizoaffective disorder, bipolar type (HCC)  Active Problems:    Medical clearance for psychiatric admission    Type 2 diabetes mellitus (HCC)    Obesity    Tobacco abuse    Hyperlipidemia    Esophageal reflux    Essential (primary) hypertension    Vitamin B12 deficiency    Hypoxia    Ambulatory dysfunction    Mild protein-calorie malnutrition (HCC)      Patient Strengths/Assets: negotiates basic needs    Patient Barriers/Limitations: difficulty adapting, lack of social/family support, limited support system, patient is on an involuntary commitment, poor insight, poor past treatment response, poor reasoning ability, poor self-care    Short Term Goals: decrease in depressive symptoms, decrease in paranoid thoughts, decrease in psychotic symptoms, ability to stay safe on the unit, ability to stay free of restraints, improvement in ability to express basic needs, improvement in reasoning ability, sleep improvement, mood stabilization, increase in socialization with peers on the unit    Long Term Goals: improvement in depression, improvement in anxiety, stabilization of mood, free of suicidal thoughts, free of homicidal thoughts, resolution of psychotic symptoms, improvement in reality testing, improvement in reasoning ability, improved insight, able to express basic needs, adequate self care, adequate sleep, adequate oral intake, appropriate interaction with peers, stable living arrangements upon discharge    Progress Towards Goals: continue psychiatric  medications as prescribed, discharge planning    Recommended Treatment: medication management, patient medication education, group therapy, milieu therapy, continued Behavioral Health psychiatric evaluation/assessment process    Treatment Frequency: daily medication monitoring, group and milieu therapy daily, monitoring through interdisciplinary rounds, monitoring through weekly patient care conferences    Expected Discharge Date:  14 days    Discharge Plan: placement in alternative living arrangement, referral for outpatient medication management with a psychiatrist, referral for outpatient psychotherapy    Treatment Plan Created/Updated By: Dereje Banuelos MD

## 2024-09-20 NOTE — PROGRESS NOTES
Progress Note - Behavioral Health   Name: Meli Nowak 59 y.o. female I MRN: [id number]  Unit/Bed#: [facility] 651-01 I Date of Admission: 7/23/2024   Date of Service: 9/20/2024 I Hospital Day: 59     Assessment & Plan  Schizoaffective disorder, bipolar type (HCC)  Progress towards goals  Awaiting CRR placement  Continue medications as prescribed  Clozaril 650 mg nightly for schizoaffective disorder  Prozac 120 mg daily for depressive symptoms   Ativan 0.5 mg twice daily for anxiety and paranoia  Melatonin 3 mg nightly for insomnia  Prazosin 2 mg nightly for PTSD associated nightmares   trazodone 50 mg nightly for insomnia    No associated orders from this encounter found during lookback period of 72 hours.        Plan   Progress Toward Goals:   Meli is progressing towards goals of inpatient psychiatric treatment by continued medication compliance and is attending therapeutic modalities on the milieu. However, the patient continues to require inpatient psychiatric hospitalization for continued medication management and titration to optimize symptom reduction, improve sleep hygiene, and demonstrate adequate self-care.    Recommended Treatment: Treatment plan and medication changes discussed and per the attending physician the plan is:    1.Continue with group therapy, milieu therapy and occupational therapy  2.Behavioral Health checks every 7 minutes  3.Continue frequent safety checks and vitals per unit protocol  4.Continue with SLIM medical management as indicated  5.Continue with current medication regimen  6.Will review labs in the a.m.  7.Disposition Planning: Discharge planning and efforts remain ongoing    Behavioral Health Medications: all current active meds have been reviewed and continue current psychiatric medications.  Current Facility-Administered Medications   Medication Dose Route Frequency Provider Last Rate    acetaminophen  650 mg Oral Q4H PRN JOSE ELIAS Figueroa      acetaminophen  650 mg Oral Q4H  PRN Marie Ziegler, CRNP      acetaminophen  975 mg Oral Q6H PRN Marie Ziegler, CRNP      aluminum-magnesium hydroxide-simethicone  30 mL Oral Q4H PRN Parris Bolanos, CRNP      Artificial Tears  1 drop Both Eyes Q6H PRN Dereje Banuelos MD      atorvastatin  10 mg Oral Daily Marie Ziegler, CRNP      bisacodyl  10 mg Rectal Daily PRN Marie Ziegler, CRNP      carvedilol  6.25 mg Oral BID With Meals Marie Ziegler, CRNP      cloZAPine  650 mg Oral HS Marie Ziegler, CRNP      cyanocobalamin  1,000 mcg Oral Daily Marie Ziegler, CRNP      famotidine  20 mg Oral BID Shan Fitzgerald, DO      FLUoxetine  120 mg Oral Daily Marie Ziegler, CRNP      fluticasone  1 puff Inhalation Daily Marie Ziegler, CRNP      hydrOXYzine HCL  25 mg Oral Q6H PRN Max 4/day Marie Ziegler, CRNP      hydrOXYzine HCL  50 mg Oral Q6H PRN Max 4/day Marie Ziegler, CRNP      ipratropium-albuterol  3 mL Nebulization Q4H PRN Darin Lawton MD      LORazepam  0.5 mg Oral BID Marie Ziegler, CRNP      Or    LORazepam  0.5 mg Intramuscular BID Marie Ziegler, CRNP      LORazepam  1 mg Intramuscular Q6H PRN Max 3/day Marie Ziegler, CRNP      LORazepam  1 mg Oral Q6H PRN Max 3/day Marie Ziegler, CRNP      melatonin  3 mg Oral HS Marie Ziegler, CRNP      mirtazapine  7.5 mg Oral HS PRN Marie Ziegler, CRNP      Multivitamin  15 mL Oral Daily Marie Ziegler, CRNP      nicotine  1 patch Transdermal Daily Marie Ziegler, CRNP      OLANZapine  2.5 mg Oral Q6H PRN Dereje Banuelos MD      Or    OLANZapine  2.5 mg Intramuscular Q6H PRN Dereje Banuelos MD      OLANZapine  5 mg Oral Q6H PRN Dereje Banuelos MD      Or    OLANZapine  5 mg Intramuscular Q6H PRN Dereje Banuelos MD      ondansetron  4 mg Oral Q6H PRN Darin Lawton MD      polyethylene glycol  17 g Oral Daily PRN Marie Ziegler, CRNP      polyethylene glycol  17 g Oral Daily Kristen Logan, CRNP      prazosin  2 mg Oral HS Marie Ziegler, CRVALE      senna-docusate  "sodium  2 tablet Oral HS Rehana Borrego PA-C      traZODone  50 mg Oral HS JOSE ELIAS Figueroa         Risks / Benefits of Treatment:  Risks, benefits, and possible side effects of medications explained to patient and patient verbalizes understanding and agreement for treatment.       Subjective    Patient was seen today for continuation of care, records reviewed and patient was discussed with the morning case review team.    Meli was seen today for psychiatric follow-up.  On assessment today, Meli was tangential and preoccupied with her appearance.  Currently seen lying in her room, patient reports increase in auditory hallucinations this morning requiring as needed Zyprexa p.o.  Voices described as \"mean\", patient is also using the radio to help mask them.  Clozaril recently up titrated to 650 mg daily to assist with symptoms, patient continues to experience auditory hallucinations in addition to paranoia and somatic/Mu-ism preoccupation.  Weekly labs continue q. Monday, will also obtain Clozaril level for continued medication management.  Tentative discharge ongoing as patient may benefit from CRR placement.    Meli denies acute suicidal/self-harm ideation/intent/plan upon direct inquiry at this time.  Meli remains behaviorally appropriate, no agitation or aggression noted on exam or in report.  Impulse control is intact.  Meli remains adherent to her current psychotropic medication regimen and denies any side effects from medications, as well as none noted on exam.    Psychiatric Review of Systems:  Behavior over the last 24 hours:  unchanged.   Sleep: good  Appetite: good  Medication side effects: none  ROS: no complaints, denies shortness of breath or chest pain and all other systems are negative for acute changes        Objective    Vitals:  Vitals:    09/20/24 0744   BP: 136/75   Pulse: 95   Resp: 17   Temp: (!) 96.6 °F (35.9 °C)   SpO2: 96%       Laboratory Results:  I have personally reviewed " all pertinent laboratory/tests results.    Mental Status Evaluation:  Appearance:  marginal hygiene, improved grooming   Behavior:  bizarre, guarded   Speech:  normal rate and volume   Mood:  less anxious   Affect:  constricted   Thought Process:  illogical, tangential, perseverative, concrete   Thought Content:  Congregational and somatic preoccupation, paranoid ideation, negative thoughts, ruminating thoughts   Perceptual Disturbances: auditory hallucinations still present, but less frequent   Risk Potential: Suicidal ideation - None at present  Homicidal ideation - None  Potential for aggression - No   Memory:  recent and remote memory grossly intact   Sensorium  person, place, and time/date      Consciousness:  alert and awake   Attention: attention span and concentration are age appropriate   Insight:  limited   Judgment: limited   Gait/Station: normal gait/station   Motor Activity: no abnormal movements       Counseling / Coordination of Care:  Patient's progress reviewed with nursing staff.  Medications, treatment progress and treatment plan reviewed with patient.  Supportive counseling provided to the patient.    This note was completed in part utilizing Dragon dictation Software. Grammatical, translation, syntax errors, random word insertions, spelling mistakes, and incomplete sentences may be an occasional consequence of this system secondary to software limitations with voice recognition, ambient noise, and hardware issues. If you have any questions or concerns about the content, text, or information contained within the body of this dictation, please contact the provider for clarification

## 2024-09-20 NOTE — NURSING NOTE
Pt cooperative yet guarded at times. Present in milieu noted to be responding to voices interacts with peers appropriately. Appetite good and medication compliant. Denies SI/HI. Q 7 minute checks maintained.

## 2024-09-20 NOTE — TREATMENT TEAM
09/20/24 1232   Team Meeting   Meeting Type Tx Team Meeting   Initial Conference Date 09/20/24   Team Members Present   Team Members Present Physician;Nurse;   Physician Team Member Dr Banuelos   Nursing Team Member Jason   Care Management Team Member Arlette   Patient/Family Present   Patient Present Yes   Patient's Family Present No     Treatment plan reviewed. Pt in agreement and signed.

## 2024-09-20 NOTE — PROGRESS NOTES
09/20/24 0736   Team Meeting   Meeting Type Daily Rounds   Initial Conference Date 09/20/24   Next Conference Date 09/23/24   Team Members Present   Team Members Present Physician;Nurse;;   Physician Team Member Dr Banuelos, Dr Alberto, JAIR Romero   Nursing Team Member Aleida   Care Management Team Member Anum   Social Work Team Member Ayse   Patient/Family Present   Patient Present No   Patient's Family Present No     Add Peter, pharmacy: Discharge pending CRR, paranoid, anx, responding, zyprexa 5 PO hearing father speaking to her, constantly asking for water and drinking a lot

## 2024-09-20 NOTE — PLAN OF CARE
Problem: DISCHARGE PLANNING - CARE MANAGEMENT  Goal: Discharge to post-acute care or home with appropriate resources  Description: INTERVENTIONS:  - Conduct assessment to determine patient/family and health care team treatment goals, and need for post-acute services based on payer coverage, community resources, and patient preferences, and barriers to discharge  - Address psychosocial, clinical, and financial barriers to discharge as identified in assessment in conjunction with the patient/family and health care team  - Arrange appropriate level of post-acute services according to patient’s   needs and preference and payer coverage in collaboration with the physician and health care team  - Communicate with and update the patient/family, physician, and health care team regarding progress on the discharge plan  - Arrange appropriate transportation to post-acute venues  Outcome: Progressing     Problem: Prexisting or High Potential for Compromised Skin Integrity  Goal: Skin integrity is maintained or improved  Description: INTERVENTIONS:  - Identify patients at risk for skin breakdown  - Assess and monitor skin integrity  - Assess and monitor nutrition and hydration status  - Monitor labs   - Assess for incontinence   - Turn and reposition patient  - Assist with mobility/ambulation  - Relieve pressure over bony prominences  - Avoid friction and shearing  - Provide appropriate hygiene as needed including keeping skin clean and dry  - Evaluate need for skin moisturizer/barrier cream  - Collaborate with interdisciplinary team   - Patient/family teaching  - Consider wound care consult   Outcome: Progressing     Problem: Alteration in Thoughts and Perception  Goal: Treatment Goal: Gain control of psychotic behaviors/thinking, reduce/eliminate presenting symptoms and demonstrate improved reality functioning upon discharge  Outcome: Progressing  Goal: Verbalize thoughts and feelings  Description: Interventions:  - Promote  a nonjudgmental and trusting relationship with the patient through active listening and therapeutic communication  - Assess patient's level of functioning, behavior and potential for risk  - Engage patient in 1 on 1 interactions  - Encourage patient to express fears, feelings, frustrations, and discuss symptoms    - Colony patient to reality, help patient recognize reality-based thinking   - Administer medications as ordered and assess for potential side effects  - Provide the patient education related to the signs and symptoms of the illness and desired effects of prescribed medications  Outcome: Progressing  Goal: Refrain from acting on delusional thinking/internal stimuli  Description: Interventions:  - Monitor patient closely, per order   - Utilize least restrictive measures   - Set reasonable limits, give positive feedback for acceptable   - Administer medications as ordered and monitor of potential side effects  Outcome: Progressing  Goal: Agree to be compliant with medication regime, as prescribed and report medication side effects  Description: Interventions:  - Offer appropriate PRN medication and supervise ingestion; conduct AIMS, as needed   Outcome: Progressing  Goal: Attend and participate in unit activities, including therapeutic, recreational, and educational groups  Description: Interventions:  -Encourage Visitation and family involvement in care  Outcome: Progressing  Goal: Recognize dysfunctional thoughts, communicate reality-based thoughts at the time of discharge  Description: Interventions:  - Provide medication and psycho-education to assist patient in compliance and developing insight into his/her illness   Outcome: Progressing  Goal: Complete daily ADLs, including personal hygiene independently, as able  Description: Interventions:  - Observe, teach, and assist patient with ADLS  - Monitor and promote a balance of rest/activity, with adequate nutrition and elimination   Outcome: Progressing      Problem: Ineffective Coping  Goal: Identifies ineffective coping skills  Outcome: Progressing  Goal: Demonstrates healthy coping skills  Outcome: Progressing  Goal: Participates in unit activities  Description: Interventions:  - Provide therapeutic environment   - Provide required programming   - Redirect inappropriate behaviors   Outcome: Progressing  Goal: Patient/Family participate in treatment and DC plans  Description: Interventions:  - Provide therapeutic environment  Outcome: Progressing  Goal: Patient/Family verbalizes awareness of resources  Outcome: Progressing  Goal: Understands least restrictive measures  Description: Interventions:  - Utilize least restrictive behavior  Outcome: Progressing  Goal: Free from restraint events  Description: - Utilize least restrictive measures   - Provide behavioral interventions   - Redirect inappropriate behaviors   Outcome: Progressing     Problem: Risk for Self Injury/Neglect  Goal: Treatment Goal: Remain safe during length of stay, learn and adopt new coping skills, and be free of self-injurious ideation, impulses and acts at the time of discharge  Outcome: Progressing  Goal: Verbalize thoughts and feelings  Description: Interventions:  - Assess and re-assess patient's lethality and potential for self-injury  - Engage patient in 1:1 interactions, daily, for a minimum of 15 minutes  - Encourage patient to express feelings, fears, frustrations, hopes  - Establish rapport/trust with patient   Outcome: Progressing  Goal: Refrain from harming self  Description: Interventions:  - Monitor patient closely, per order  - Develop a trusting relationship  - Supervise medication ingestion, monitor effects and side effects   Outcome: Progressing  Goal: Attend and participate in unit activities, including therapeutic, recreational, and educational groups  Description: Interventions:  - Provide therapeutic and educational activities daily, encourage attendance and participation,  and document same in the medical record  - Obtain collateral information, encourage visitation and family involvement in care   Outcome: Progressing  Goal: Recognize maladaptive responses and adopt new coping mechanisms  Outcome: Progressing     Problem: Depression  Goal: Treatment Goal: Demonstrate behavioral control of depressive symptoms, verbalize feelings of improved mood/affect, and adopt new coping skills prior to discharge  Outcome: Progressing  Goal: Verbalize thoughts and feelings  Description: Interventions:  - Assess and re-assess patient's level of risk   - Engage patient in 1:1 interactions, daily, for a minimum of 15 minutes   - Encourage patient to express feelings, fears, frustrations, hopes   Outcome: Progressing  Goal: Refrain from isolation  Description: Interventions:  - Develop a trusting relationship   - Encourage socialization   Outcome: Progressing  Goal: Refrain from self-neglect  Outcome: Progressing  Goal: Attend and participate in unit activities, including therapeutic, recreational, and educational groups  Description: Interventions:  - Provide therapeutic and educational activities daily, encourage attendance and participation, and document same in the medical record   Outcome: Progressing     Problem: Anxiety  Goal: Anxiety is at manageable level  Description: Interventions:  - Assess and monitor patient's anxiety level.   - Monitor for signs and symptoms (heart palpitations, chest pain, shortness of breath, headaches, nausea, feeling jumpy, restlessness, irritable, apprehensive).   - Collaborate with interdisciplinary team and initiate plan and interventions as ordered.  - Brandon patient to unit/surroundings  - Explain treatment plan  - Encourage participation in care  - Encourage verbalization of concerns/fears  - Identify coping mechanisms  - Assist in developing anxiety-reducing skills  - Administer/offer alternative therapies  - Limit or eliminate stimulants  Outcome:  Progressing     Problem: SAFETY,RESTRAINT: NV/NON-SELF DESTRUCTIVE BEHAVIOR  Goal: Remains free of harm/injury (restraint for non violent/non self-detsructive behavior)  Description: INTERVENTIONS:  - Instruct patient/family regarding restraint use   - Assess and monitor physiologic and psychological status   - Provide interventions and comfort measures to meet assessed patient needs   - Identify and implement measures to help patient regain control  - Assess readiness for release of restraint   Outcome: Progressing  Goal: Returns to optimal restraint-free functioning  Description: INTERVENTIONS:  - Assess the patient's behavior and symptoms that indicate continued need for restraint  - Identify and implement measures to help patient regain control  - Assess readiness for release of restraint   Outcome: Progressing     Problem: Potential for Falls  Goal: Patient will remain free of falls  Description: INTERVENTIONS:  - Educate patient on patient safety including physical limitations  - Instruct patient to call for assistance with activity   - Consult OT/PT to assist with strengthening/mobility   - Keep Call bell within reach  - Keep bed low and locked with side rails adjusted as appropriate  - Keep care items and personal belongings within reach  - Initiate and maintain comfort rounds  - Offer Toileting every 2 Hours, in advance of need  - Initiate/Maintain bed and chair alarm  - Obtain necessary fall risk management equipment: walker, wheelchair  - Apply yellow socks and bracelet for high fall risk patients  - Patient moved to room near nurses station  Outcome: Progressing     Problem: Nutrition/Hydration-ADULT  Goal: Nutrient/Hydration intake appropriate for improving, restoring or maintaining nutritional needs  Description: Monitor and assess patient's nutrition/hydration status for malnutrition. Collaborate with interdisciplinary team and initiate plan and interventions as ordered.  Monitor patient's weight and  dietary intake as ordered or per policy. Utilize nutrition screening tool and intervene as necessary. Determine patient's food preferences and provide high-protein, high-caloric foods as appropriate.     INTERVENTIONS:  - Monitor oral intake, urinary output, labs, and treatment plans  - Assess nutrition and hydration status and recommend course of action  - Evaluate amount of meals eaten  - Assist patient with eating if necessary   - Allow adequate time for meals  - Recommend/ encourage appropriate diets, oral nutritional supplements, and vitamin/mineral supplements  - Order, calculate, and assess calorie counts as needed  - Recommend, monitor, and adjust tube feedings and TPN/PPN based on assessed needs  - Assess need for intravenous fluids  - Provide specific nutrition/hydration education as appropriate  - Include patient/family/caregiver in decisions related to nutrition  Outcome: Progressing

## 2024-09-20 NOTE — NURSING NOTE
Pt anxious, disorganized and pressured.  When asked about AH pt was unable to describe them.  VSS.  Good appetite and steady gait.  VSS.  Attended group.  Guarded but med-compliant.  Monitored for safety and support.

## 2024-09-20 NOTE — TREATMENT TEAM
Pt attends all group and needs redirection.  Pt needs encouragement for focus.  Tangential and impulsive. Pt in afternoon anxious about getting credit for group only if she left early and repetitive asking when group over.    09/20/24 1000   Activity/Group Checklist   Group Other (Comment)  (Communtiy meeting:  recovery, focus and need)   Attendance Attended   Attendance Duration (min) 31-45   Interactions Disorganized interaction   Affect/Mood Wide   Goals Achieved Identified triggers;Identified feelings;Able to listen to others;Able to engage in interactions;Able to reflect/comment on own behavior;Verbalized increased hopefulness;Able to manage/cope with feelings;Able to self-disclose;Able to recieve feedback

## 2024-09-20 NOTE — PLAN OF CARE
Problem: DISCHARGE PLANNING - CARE MANAGEMENT  Goal: Discharge to post-acute care or home with appropriate resources  Description: INTERVENTIONS:  - Conduct assessment to determine patient/family and health care team treatment goals, and need for post-acute services based on payer coverage, community resources, and patient preferences, and barriers to discharge  - Address psychosocial, clinical, and financial barriers to discharge as identified in assessment in conjunction with the patient/family and health care team  - Arrange appropriate level of post-acute services according to patient’s   needs and preference and payer coverage in collaboration with the physician and health care team  - Communicate with and update the patient/family, physician, and health care team regarding progress on the discharge plan  - Arrange appropriate transportation to post-acute venues  9/20/2024 0858 by GARIMA Tristan  Outcome: Progressing  Patient in agreement with referral to CRR.

## 2024-09-21 PROCEDURE — 99232 SBSQ HOSP IP/OBS MODERATE 35: CPT

## 2024-09-21 RX ADMIN — CARVEDILOL 6.25 MG: 6.25 TABLET, FILM COATED ORAL at 08:18

## 2024-09-21 RX ADMIN — FLUOXETINE 120 MG: 20 SOLUTION ORAL at 08:19

## 2024-09-21 RX ADMIN — TRAZODONE HYDROCHLORIDE 50 MG: 50 TABLET ORAL at 21:30

## 2024-09-21 RX ADMIN — LORAZEPAM 0.5 MG: 0.5 TABLET ORAL at 08:18

## 2024-09-21 RX ADMIN — POLYETHYLENE GLYCOL 3350 17 G: 17 POWDER, FOR SOLUTION ORAL at 08:18

## 2024-09-21 RX ADMIN — MELATONIN TAB 3 MG 3 MG: 3 TAB at 21:30

## 2024-09-21 RX ADMIN — ATORVASTATIN CALCIUM 10 MG: 10 TABLET, FILM COATED ORAL at 08:28

## 2024-09-21 RX ADMIN — FAMOTIDINE 20 MG: 20 TABLET ORAL at 08:18

## 2024-09-21 RX ADMIN — NICOTINE 1 PATCH: 7 PATCH, EXTENDED RELEASE TRANSDERMAL at 08:26

## 2024-09-21 RX ADMIN — CLOZAPINE 650 MG: 25 TABLET ORAL at 21:30

## 2024-09-21 RX ADMIN — Medication 15 ML: at 08:18

## 2024-09-21 RX ADMIN — SENNOSIDES AND DOCUSATE SODIUM 2 TABLET: 8.6; 5 TABLET ORAL at 21:30

## 2024-09-21 RX ADMIN — FAMOTIDINE 20 MG: 20 TABLET ORAL at 17:11

## 2024-09-21 RX ADMIN — PRAZOSIN HYDROCHLORIDE 2 MG: 1 CAPSULE ORAL at 21:30

## 2024-09-21 RX ADMIN — LORAZEPAM 0.5 MG: 0.5 TABLET ORAL at 17:11

## 2024-09-21 RX ADMIN — CYANOCOBALAMIN TAB 1000 MCG 1000 MCG: 1000 TAB at 08:18

## 2024-09-21 RX ADMIN — FLUTICASONE FUROATE 1 PUFF: 100 POWDER RESPIRATORY (INHALATION) at 08:26

## 2024-09-21 RX ADMIN — CARVEDILOL 6.25 MG: 6.25 TABLET, FILM COATED ORAL at 17:11

## 2024-09-21 NOTE — ASSESSMENT & PLAN NOTE
Progress towards goals  Awaiting CRR placement  Continue medications as prescribed  Clozaril 650 mg nightly for schizoaffective disorder, Clozaril level 9/16 was 541, last ANC on 9/16 was 3.86  Prozac 120 mg daily for depressive symptoms   Ativan 0.5 mg twice daily for anxiety and paranoia  Melatonin 3 mg nightly for insomnia  Prazosin 2 mg nightly for PTSD associated nightmares   trazodone 50 mg nightly for insomnia    No associated orders from this encounter found during lookback period of 72 hours.

## 2024-09-21 NOTE — NURSING NOTE
Pt tangential and preoccupied with radio on unit. Pt reminded of not leaving radio in room if she not in there. Continues to ask for multiple cups of water limits have been set on the water by staff. Noted to be pacing in ponce responding to voices. Denies SI/HI. Q 7 minute checks maintained.

## 2024-09-21 NOTE — PLAN OF CARE
Problem: Alteration in Thoughts and Perception  Goal: Verbalize thoughts and feelings  Description: Interventions:  - Promote a nonjudgmental and trusting relationship with the patient through active listening and therapeutic communication  - Assess patient's level of functioning, behavior and potential for risk  - Engage patient in 1 on 1 interactions  - Encourage patient to express fears, feelings, frustrations, and discuss symptoms    - [place] patient to reality, help patient recognize reality-based thinking   - Administer medications as ordered and assess for potential side effects  - Provide the patient education related to the signs and symptoms of the illness and desired effects of prescribed medications  Outcome: Progressing     Problem: Risk for Self Injury/Neglect  Goal: Treatment Goal: Remain safe during length of stay, learn and adopt new coping skills, and be free of self-injurious ideation, impulses and acts at the time of discharge  Outcome: Progressing  Goal: Verbalize thoughts and feelings  Description: Interventions:  - Assess and re-assess patient's lethality and potential for self-injury  - Engage patient in 1:1 interactions, daily, for a minimum of 15 minutes  - Encourage patient to express feelings, fears, frustrations, hopes  - Establish rapport/trust with patient   Outcome: Progressing  Goal: Refrain from harming self  Description: Interventions:  - Monitor patient closely, per order  - Develop a trusting relationship  - Supervise medication ingestion, monitor effects and side effects   Outcome: Progressing  Goal: Attend and participate in unit activities, including therapeutic, recreational, and educational groups  Description: Interventions:  - Provide therapeutic and educational activities daily, encourage attendance and participation, and document same in the medical record  - Obtain collateral information, encourage visitation and family involvement in care   Outcome:  Progressing  Goal: Recognize maladaptive responses and adopt new coping mechanisms  Outcome: Progressing     Problem: Potential for Falls  Goal: Patient will remain free of falls  Description: INTERVENTIONS:  - Educate patient on patient safety including physical limitations  - Instruct patient to call for assistance with activity   - Consult OT/PT to assist with strengthening/mobility   - Keep Call bell within reach  - Keep bed low and locked with side rails adjusted as appropriate  - Keep care items and personal belongings within reach  - Initiate and maintain comfort rounds  - Offer Toileting every 2 Hours, in advance of need  - Initiate/Maintain bed and chair alarm  - Obtain necessary fall risk management equipment: walker, wheelchair  - Apply yellow socks and bracelet for high fall risk patients  - Patient moved to room near nurses station  Outcome: Progressing

## 2024-09-21 NOTE — PROGRESS NOTES
Progress Note - Behavioral Health   Name: Meli Nowak 59 y.o. female I MRN: [id number]   Unit/Bed#: [facility] 651-01 I Date of Admission: 7/23/2024   Date of Service: 9/21/2024 I Hospital Day: 60         Assessment & Plan  Schizoaffective disorder, bipolar type (HCC)  Progress towards goals  Awaiting CRR placement  Continue medications as prescribed  Clozaril 650 mg nightly for schizoaffective disorder, Clozaril level 9/16 was 541, last ANC on 9/16 was 3.86  Prozac 120 mg daily for depressive symptoms   Ativan 0.5 mg twice daily for anxiety and paranoia  Melatonin 3 mg nightly for insomnia  Prazosin 2 mg nightly for PTSD associated nightmares   trazodone 50 mg nightly for insomnia    No associated orders from this encounter found during lookback period of 72 hours.          Recommended Treatment:     Treatment plan and medication changes discussed and per the attending physician the plan is:     1.Continue with group therapy, milieu therapy and occupational therapy  2.Behavioral Health checks every 7 minutes  3.Continue frequent safety checks and vitals per unit protocol  4.Continue with SLIM medical management as indicated  5.Continue with current medication regimen  6.Will review labs in the a.m.  7.Disposition Planning: Discharge planning and efforts remain ongoing     Planned medication and treatment changes:    All current active medications have been reviewed  Encourage group therapy, milieu therapy and occupational therapy  Behavioral Health checks every 7 minutes  Discharge planning  Continue current medications:    Current medications:  Current Facility-Administered Medications   Medication Dose Route Frequency Provider Last Rate    acetaminophen  650 mg Oral Q4H PRN JOSE ELIAS Figueroa      acetaminophen  650 mg Oral Q4H PRN JOSE ELIAS Figueroa      acetaminophen  975 mg Oral Q6H PRN JOSE ELIAS Figueroa      aluminum-magnesium hydroxide-simethicone  30 mL Oral Q4H PRN JOSE ELIAS Puri       Artificial Tears  1 drop Both Eyes Q6H PRN Dereje Banuelos MD      atorvastatin  10 mg Oral Daily Marie Ziegler, CRNP      bisacodyl  10 mg Rectal Daily PRN Marie Ziegler, CRNP      carvedilol  6.25 mg Oral BID With Meals Marie Ziegler, CRNP      cloZAPine  650 mg Oral HS Marie Ziegler, CRNP      cyanocobalamin  1,000 mcg Oral Daily Marie Ziegler, CRNP      famotidine  20 mg Oral BID Shan Fitzgerald,       FLUoxetine  120 mg Oral Daily Marie Ziegler, CRNP      fluticasone  1 puff Inhalation Daily Marie Ziegler, CRNP      hydrOXYzine HCL  25 mg Oral Q6H PRN Max 4/day Marie Ziegler, CRNP      hydrOXYzine HCL  50 mg Oral Q6H PRN Max 4/day Marie Ziegler, CRNP      ipratropium-albuterol  3 mL Nebulization Q4H PRN Darin Lawton MD      LORazepam  0.5 mg Oral BID Marie Ziegler, CRNP      Or    LORazepam  0.5 mg Intramuscular BID Marie Ziegler, CRNP      LORazepam  1 mg Intramuscular Q6H PRN Max 3/day Marie Ziegler, CRNP      LORazepam  1 mg Oral Q6H PRN Max 3/day Marie Ziegler, CRNP      melatonin  3 mg Oral HS Marie Ziegler, CRNP      mirtazapine  7.5 mg Oral HS PRN Marie Ziegler, CRNP      Multivitamin  15 mL Oral Daily Marie Ziegler, CRNP      nicotine  1 patch Transdermal Daily Marie Ziegler, CRNP      OLANZapine  2.5 mg Oral Q6H PRN Dereje Banuelos MD      Or    OLANZapine  2.5 mg Intramuscular Q6H PRN Dereje Banuelos MD      OLANZapine  5 mg Oral Q6H PRN Dereje Banuelos MD      Or    OLANZapine  5 mg Intramuscular Q6H PRN Dereje Banuelos MD      ondansetron  4 mg Oral Q6H PRN Dairn Lawton MD      polyethylene glycol  17 g Oral Daily PRN Marie Ziegler, CRVALE      polyethylene glycol  17 g Oral Daily Kristen Logan, JOSE ELIAS      prazosin  2 mg Oral HS Marie Ziegler, JOSE ELIAS      senna-docusate sodium  2 tablet Oral HS Rehana Borrego, YENNY      traZODone  50 mg Oral HS Marie Ziegler, JOSE ELIAS         Risks / Benefits of Treatment:    Risks, benefits, and possible side  "effects of medications explained to patient and patient verbalizes understanding and agreement for treatment.    Subjective:    Behavior over the last 24 hours: regressed.     Per staff, Meli has been tangential and religiously preoccupied.  She drank numerous cups of water but Na was still normal on 9/20/2024 at 136.  She is compliant with meals  and medications.     Meli was seen in her room for psychiatric follow up today.  Meli remains religiously preoccupied.  She was tangential throughout the interview but was redirectable.  She reports that she continues to have difficulty staying asleep at night.  She denies any side effects from her current medications.  Her last BM was 9/19/2024.  She continues with AH of \"mean voices\" but states listening to the radio is helpful.  She denies any suicidal or homicidal ideation plan or intent.      Sleep: slept off and on  Appetite: normal  Medication side effects: No   ROS: no complaints    Mental Status Evaluation:    Appearance:  disheveled, marginal hygiene   Behavior:  calm, bizarre   Speech:  tangential   Mood:  mildly anxious   Affect:  constricted   Thought Process:  tangential, concrete   Associations: tangential associations   Thought Content:  [employer] preoccupation   Perceptual Disturbances: auditory hallucinations of \"mean voices   Risk Potential: Suicidal ideation - None  Homicidal ideation - None  Potential for aggression - No   Sensorium:  oriented to person, place, and situation   Memory:  recent memory intact   Consciousness:  alert and awake   Attention/Concentration: attention span and concentration appear shorter than expected for age   Insight:  limited   Judgment: limited   Gait/Station: in bed   Motor Activity: no abnormal movements     Vital signs in last 24 hours:    Temp:  [97 °F (36.1 °C)-97.5 °F (36.4 °C)] 97.5 °F (36.4 °C)  HR:  [] 79  Resp:  [18] 18  BP: (118-169)/(61-78) 169/78         Laboratory results: I have personally reviewed all " pertinent laboratory/tests results    Results from the past 24 hours: No results found for this or any previous visit (from the past 24 hour(s)).  Most Recent Labs:   Lab Results   Component Value Date    WBC 7.53 09/16/2024    RBC 4.35 09/16/2024    HGB 13.1 09/16/2024    HCT 41.3 09/16/2024     09/16/2024    RDW 15.2 (H) 09/16/2024    NEUTROABS 3.86 09/16/2024    SODIUM 136 09/20/2024    K 3.9 09/20/2024     09/20/2024    CO2 26 09/20/2024    BUN 17 09/20/2024    CREATININE 0.86 09/20/2024    GLUC 198 (H) 09/20/2024    CALCIUM 9.6 09/20/2024    AST 21 09/20/2024    ALT 30 09/20/2024    ALKPHOS 96 09/20/2024    TP 7.2 09/20/2024    ALB 4.2 09/20/2024    TBILI 0.34 09/20/2024    AMMONIA 32 07/03/2024    [id number] 2.000 07/24/2024    HGBA1C 6.4 (H) 05/03/2024     05/03/2024       Suicide/Homicide Risk Assessment:    Risk of Harm to Self:   Nursing Suicide Risk Assessment Last 24 hours: C-SSRS Risk (Since Last Contact)  Calculated C-SSRS Risk Score (Since Last Contact): No Risk Indicated  Current Specific Risk Factors include: diagnosis of mood disorder  Protective Factors: no current suicidal ideation, ability to communicate with staff on the unit, able to contract for safety on the unit, improved psychotic symptoms, responds to redirection  Based on today's assessment, Meli presents the following risk of harm to self: low    Risk of Harm to Others:  Nursing Homicide Risk Assessment: Violence Risk to Others: Denies within past 6 months  Current Specific Risk Factors include: behavior suggesting impulsivity, social difficulties  Protective Factors: no current homicidal ideation, psychotic symptoms are less prominent, compliant with medications on the unit as ordered, compliant with unit milieu, follows staff redirection  Based on today's assessment, Meli presents the following risk of harm to others: low    The following interventions are recommended: behavioral checks every 7 minutes, continued  hospitalization on locked unit    Progress Toward Goals: progressing    Counseling / Coordination of Care:    Total floor / unit time spent today 15 minutes. Greater than 50% of total time was spent with the patient and / or family counseling and / or coordination of care. A description of counseling / coordination of care:    Administrative Statements   I have spent a total time of 30 minutes in caring for this patient on the day of the visit/encounter including Counseling / Coordination of care, Documenting in the medical record, Reviewing / ordering tests, medicine, procedures  , Obtaining or reviewing history  , and Communicating with other healthcare professionals .    JOSE ELIAS Malik 09/21/24

## 2024-09-21 NOTE — NURSING NOTE
Patient was withdrawn to her room but visible intermittently to make her needs known to staff. Endorses anxiety, denies all other psych s/s. Remained paranoid and disorganized with pressured speech. Patient was asking this writer if she will go to [facility]. Patient counseled. No c/o pain. Had 50% for dinner. Took her medications. Safety checks ongoing.

## 2024-09-22 PROCEDURE — 99232 SBSQ HOSP IP/OBS MODERATE 35: CPT

## 2024-09-22 RX ADMIN — SENNOSIDES AND DOCUSATE SODIUM 2 TABLET: 8.6; 5 TABLET ORAL at 21:00

## 2024-09-22 RX ADMIN — Medication 15 ML: at 08:42

## 2024-09-22 RX ADMIN — CYANOCOBALAMIN TAB 1000 MCG 1000 MCG: 1000 TAB at 08:43

## 2024-09-22 RX ADMIN — PRAZOSIN HYDROCHLORIDE 2 MG: 1 CAPSULE ORAL at 21:00

## 2024-09-22 RX ADMIN — FAMOTIDINE 20 MG: 20 TABLET ORAL at 08:43

## 2024-09-22 RX ADMIN — MELATONIN TAB 3 MG 3 MG: 3 TAB at 21:01

## 2024-09-22 RX ADMIN — CARVEDILOL 6.25 MG: 6.25 TABLET, FILM COATED ORAL at 16:58

## 2024-09-22 RX ADMIN — FAMOTIDINE 20 MG: 20 TABLET ORAL at 17:01

## 2024-09-22 RX ADMIN — CARVEDILOL 6.25 MG: 6.25 TABLET, FILM COATED ORAL at 08:43

## 2024-09-22 RX ADMIN — FLUOXETINE 120 MG: 20 SOLUTION ORAL at 08:47

## 2024-09-22 RX ADMIN — NICOTINE 1 PATCH: 7 PATCH, EXTENDED RELEASE TRANSDERMAL at 08:50

## 2024-09-22 RX ADMIN — LORAZEPAM 0.5 MG: 0.5 TABLET ORAL at 17:01

## 2024-09-22 RX ADMIN — CLOZAPINE 650 MG: 25 TABLET ORAL at 21:01

## 2024-09-22 RX ADMIN — LORAZEPAM 0.5 MG: 0.5 TABLET ORAL at 08:43

## 2024-09-22 RX ADMIN — ALUMINUM HYDROXIDE, MAGNESIUM HYDROXIDE, AND DIMETHICONE 30 ML: 200; 20; 200 SUSPENSION ORAL at 20:25

## 2024-09-22 RX ADMIN — TRAZODONE HYDROCHLORIDE 50 MG: 50 TABLET ORAL at 21:01

## 2024-09-22 RX ADMIN — POLYETHYLENE GLYCOL 3350 17 G: 17 POWDER, FOR SOLUTION ORAL at 08:44

## 2024-09-22 RX ADMIN — ATORVASTATIN CALCIUM 10 MG: 10 TABLET, FILM COATED ORAL at 08:43

## 2024-09-22 RX ADMIN — FLUTICASONE FUROATE 1 PUFF: 100 POWDER RESPIRATORY (INHALATION) at 08:44

## 2024-09-22 NOTE — PLAN OF CARE
Problem: DISCHARGE PLANNING - CARE MANAGEMENT  Goal: Discharge to post-acute care or home with appropriate resources  Description: INTERVENTIONS:  - Conduct assessment to determine patient/family and health care team treatment goals, and need for post-acute services based on payer coverage, community resources, and patient preferences, and barriers to discharge  - Address psychosocial, clinical, and financial barriers to discharge as identified in assessment in conjunction with the patient/family and health care team  - Arrange appropriate level of post-acute services according to patient’s   needs and preference and payer coverage in collaboration with the physician and health care team  - Communicate with and update the patient/family, physician, and health care team regarding progress on the discharge plan  - Arrange appropriate transportation to post-acute venues  Outcome: Progressing     Problem: Prexisting or High Potential for Compromised Skin Integrity  Goal: Skin integrity is maintained or improved  Description: INTERVENTIONS:  - Identify patients at risk for skin breakdown  - Assess and monitor skin integrity  - Assess and monitor nutrition and hydration status  - Monitor labs   - Assess for incontinence   - Turn and reposition patient  - Assist with mobility/ambulation  - Relieve pressure over bony prominences  - Avoid friction and shearing  - Provide appropriate hygiene as needed including keeping skin clean and dry  - Evaluate need for skin moisturizer/barrier cream  - Collaborate with interdisciplinary team   - Patient/family teaching  - Consider wound care consult   Outcome: Progressing     Problem: Alteration in Thoughts and Perception  Goal: Treatment Goal: Gain control of psychotic behaviors/thinking, reduce/eliminate presenting symptoms and demonstrate improved reality functioning upon discharge  Outcome: Progressing  Goal: Verbalize thoughts and feelings  Description: Interventions:  - Promote  a nonjudgmental and trusting relationship with the patient through active listening and therapeutic communication  - Assess patient's level of functioning, behavior and potential for risk  - Engage patient in 1 on 1 interactions  - Encourage patient to express fears, feelings, frustrations, and discuss symptoms    - [place] patient to reality, help patient recognize reality-based thinking   - Administer medications as ordered and assess for potential side effects  - Provide the patient education related to the signs and symptoms of the illness and desired effects of prescribed medications  Outcome: Progressing  Goal: Refrain from acting on delusional thinking/internal stimuli  Description: Interventions:  - Monitor patient closely, per order   - Utilize least restrictive measures   - Set reasonable limits, give positive feedback for acceptable   - Administer medications as ordered and monitor of potential side effects  Outcome: Progressing  Goal: Agree to be compliant with medication regime, as prescribed and report medication side effects  Description: Interventions:  - Offer appropriate PRN medication and supervise ingestion; conduct AIMS, as needed   Outcome: Progressing  Goal: Attend and participate in unit activities, including therapeutic, recreational, and educational groups  Description: Interventions:  -Encourage Visitation and family involvement in care  Outcome: Progressing  Goal: Recognize dysfunctional thoughts, communicate reality-based thoughts at the time of discharge  Description: Interventions:  - Provide medication and psycho-education to assist patient in compliance and developing insight into his/her illness   Outcome: Progressing  Goal: Complete daily ADLs, including personal hygiene independently, as able  Description: Interventions:  - Observe, teach, and assist patient with ADLS  - Monitor and promote a balance of rest/activity, with adequate nutrition and elimination   Outcome: Progressing      Problem: Ineffective Coping  Goal: Identifies ineffective coping skills  Outcome: Progressing  Goal: Demonstrates healthy coping skills  Outcome: Progressing  Goal: Participates in unit activities  Description: Interventions:  - Provide therapeutic environment   - Provide required programming   - Redirect inappropriate behaviors   Outcome: Progressing  Goal: Patient/Family participate in treatment and DC plans  Description: Interventions:  - Provide therapeutic environment  Outcome: Progressing  Goal: Patient/Family verbalizes awareness of resources  Outcome: Progressing  Goal: Understands least restrictive measures  Description: Interventions:  - Utilize least restrictive behavior  Outcome: Progressing  Goal: Free from restraint events  Description: - Utilize least restrictive measures   - Provide behavioral interventions   - Redirect inappropriate behaviors   Outcome: Progressing     Problem: Risk for Self Injury/Neglect  Goal: Treatment Goal: Remain safe during length of stay, learn and adopt new coping skills, and be free of self-injurious ideation, impulses and acts at the time of discharge  Outcome: Progressing  Goal: Verbalize thoughts and feelings  Description: Interventions:  - Assess and re-assess patient's lethality and potential for self-injury  - Engage patient in 1:1 interactions, daily, for a minimum of 15 minutes  - Encourage patient to express feelings, fears, frustrations, hopes  - Establish rapport/trust with patient   Outcome: Progressing  Goal: Refrain from harming self  Description: Interventions:  - Monitor patient closely, per order  - Develop a trusting relationship  - Supervise medication ingestion, monitor effects and side effects   Outcome: Progressing  Goal: Attend and participate in unit activities, including therapeutic, recreational, and educational groups  Description: Interventions:  - Provide therapeutic and educational activities daily, encourage attendance and participation,  and document same in the medical record  - Obtain collateral information, encourage visitation and family involvement in care   Outcome: Progressing  Goal: Recognize maladaptive responses and adopt new coping mechanisms  Outcome: Progressing     Problem: Depression  Goal: Treatment Goal: Demonstrate behavioral control of depressive symptoms, verbalize feelings of improved mood/affect, and adopt new coping skills prior to discharge  Outcome: Progressing  Goal: Verbalize thoughts and feelings  Description: Interventions:  - Assess and re-assess patient's level of risk   - Engage patient in 1:1 interactions, daily, for a minimum of 15 minutes   - Encourage patient to express feelings, fears, frustrations, hopes   Outcome: Progressing  Goal: Refrain from isolation  Description: Interventions:  - Develop a trusting relationship   - Encourage socialization   Outcome: Progressing  Goal: Refrain from self-neglect  Outcome: Progressing  Goal: Attend and participate in unit activities, including therapeutic, recreational, and educational groups  Description: Interventions:  - Provide therapeutic and educational activities daily, encourage attendance and participation, and document same in the medical record   Outcome: Progressing     Problem: Anxiety  Goal: Anxiety is at manageable level  Description: Interventions:  - Assess and monitor patient's anxiety level.   - Monitor for signs and symptoms (heart palpitations, chest pain, shortness of breath, headaches, nausea, feeling jumpy, restlessness, irritable, apprehensive).   - Collaborate with interdisciplinary team and initiate plan and interventions as ordered.  - [place] patient to unit/surroundings  - Explain treatment plan  - Encourage participation in care  - Encourage verbalization of concerns/fears  - Identify coping mechanisms  - Assist in developing anxiety-reducing skills  - Administer/offer alternative therapies  - Limit or eliminate stimulants  Outcome:  Progressing     Problem: SAFETY,RESTRAINT: NV/NON-SELF DESTRUCTIVE BEHAVIOR  Goal: Remains free of harm/injury (restraint for non violent/non self-detsructive behavior)  Description: INTERVENTIONS:  - Instruct patient/family regarding restraint use   - Assess and monitor physiologic and psychological status   - Provide interventions and comfort measures to meet assessed patient needs   - Identify and implement measures to help patient regain control  - Assess readiness for release of restraint   Outcome: Progressing  Goal: Returns to optimal restraint-free functioning  Description: INTERVENTIONS:  - Assess the patient's behavior and symptoms that indicate continued need for restraint  - Identify and implement measures to help patient regain control  - Assess readiness for release of restraint   Outcome: Progressing     Problem: Potential for Falls  Goal: Patient will remain free of falls  Description: INTERVENTIONS:  - Educate patient on patient safety including physical limitations  - Instruct patient to call for assistance with activity   - Consult OT/PT to assist with strengthening/mobility   - Keep Call bell within reach  - Keep bed low and locked with side rails adjusted as appropriate  - Keep care items and personal belongings within reach  - Initiate and maintain comfort rounds  - Offer Toileting every 2 Hours, in advance of need  - Initiate/Maintain bed and chair alarm  - Obtain necessary fall risk management equipment: walker, wheelchair  - Apply yellow socks and bracelet for high fall risk patients  - Patient moved to room near nurses station  Outcome: Progressing     Problem: Nutrition/Hydration-ADULT  Goal: Nutrient/Hydration intake appropriate for improving, restoring or maintaining nutritional needs  Description: Monitor and assess patient's nutrition/hydration status for malnutrition. Collaborate with interdisciplinary team and initiate plan and interventions as ordered.  Monitor patient's weight and  dietary intake as ordered or per policy. Utilize nutrition screening tool and intervene as necessary. Determine patient's food preferences and provide high-protein, high-caloric foods as appropriate.     INTERVENTIONS:  - Monitor oral intake, urinary output, labs, and treatment plans  - Assess nutrition and hydration status and recommend course of action  - Evaluate amount of meals eaten  - Assist patient with eating if necessary   - Allow adequate time for meals  - Recommend/ encourage appropriate diets, oral nutritional supplements, and vitamin/mineral supplements  - Order, calculate, and assess calorie counts as needed  - Recommend, monitor, and adjust tube feedings and TPN/PPN based on assessed needs  - Assess need for intravenous fluids  - Provide specific nutrition/hydration education as appropriate  - Include patient/family/caregiver in decisions related to nutrition  Outcome: Progressing

## 2024-09-22 NOTE — NURSING NOTE
Patient was withdrawn to her room but came out for breakfast. Denies all psych s/s. No behaviors noted. No c/o pain. Had 100% for breakfast. Took her AM medications. Safety checks ongoing.

## 2024-09-22 NOTE — PROGRESS NOTES
Progress Note - Behavioral Health   Name: Meli Nowak 59 y.o. female I MRN: [id number]   Unit/Bed#: [facility] 651-01 I Date of Admission: 7/23/2024   Date of Service: 9/22/2024 I Hospital Day: 61         Assessment & Plan  Schizoaffective disorder, bipolar type (HCC)  Progress towards goals  Awaiting CRR placement  Continue medications as prescribed  Clozaril 650 mg nightly for schizoaffective disorder, Clozaril level 9/16 was 541, last ANC on 9/16 was 3.86  Prozac 120 mg daily for depressive symptoms   Ativan 0.5 mg twice daily for anxiety and paranoia  Melatonin 3 mg nightly for insomnia  Prazosin 2 mg nightly for PTSD associated nightmares   trazodone 50 mg nightly for insomnia    No associated orders from this encounter found during lookback period of 72 hours.          Recommended Treatment:     Treatment plan and medication changes discussed and per the attending physician the plan is:     1.Continue with group therapy, milieu therapy and occupational therapy  2.Behavioral Health checks every 7 minutes  3.Continue frequent safety checks and vitals per unit protocol  4.Continue with SLIM medical management as indicated  5.Continue with current medication regimen  6.Will review labs in the a.m.  7.Disposition Planning: Discharge planning and efforts remain ongoing     Planned medication and treatment changes:    All current active medications have been reviewed  Encourage group therapy, milieu therapy and occupational therapy  Behavioral Health checks every 7 minutes  Discharge planning  Continue current medications:    Current medications:  Current Facility-Administered Medications   Medication Dose Route Frequency Provider Last Rate    acetaminophen  650 mg Oral Q4H PRN JOSE ELIAS Figueroa      acetaminophen  650 mg Oral Q4H PRN JOSE ELIAS Figueroa      acetaminophen  975 mg Oral Q6H PRN JOSE ELIAS Figueroa      aluminum-magnesium hydroxide-simethicone  30 mL Oral Q4H PRN JOSE ELIAS Puri       Artificial Tears  1 drop Both Eyes Q6H PRN Dereje Banuelos MD      atorvastatin  10 mg Oral Daily Marie Ziegler, CRNP      bisacodyl  10 mg Rectal Daily PRN Marie Ziegler, CRNP      carvedilol  6.25 mg Oral BID With Meals Marie Ziegler, CRNP      cloZAPine  650 mg Oral HS Marie Ziegler, CRNP      cyanocobalamin  1,000 mcg Oral Daily Marie Ziegler, CRNP      famotidine  20 mg Oral BID Shan Fitzgerald,       FLUoxetine  120 mg Oral Daily Marie Ziegler, CRNP      fluticasone  1 puff Inhalation Daily Marie Ziegler, CRNP      hydrOXYzine HCL  25 mg Oral Q6H PRN Max 4/day Marie Ziegler, CRNP      hydrOXYzine HCL  50 mg Oral Q6H PRN Max 4/day Marie Ziegler, CRNP      ipratropium-albuterol  3 mL Nebulization Q4H PRN Darin Lawton MD      LORazepam  0.5 mg Oral BID Marie Ziegler, CRNP      Or    LORazepam  0.5 mg Intramuscular BID Marie Ziegler, CRNP      LORazepam  1 mg Intramuscular Q6H PRN Max 3/day Marie Ziegler, CRNP      LORazepam  1 mg Oral Q6H PRN Max 3/day Marie Ziegler, CRNP      melatonin  3 mg Oral HS Marie Ziegler, CRNP      mirtazapine  7.5 mg Oral HS PRN Marie Ziegler, CRNP      Multivitamin  15 mL Oral Daily Marie Ziegler, CRNP      nicotine  1 patch Transdermal Daily Marie Ziegler, CRNP      OLANZapine  2.5 mg Oral Q6H PRN Dereje Banuelos MD      Or    OLANZapine  2.5 mg Intramuscular Q6H PRN Dereje Banuelos MD      OLANZapine  5 mg Oral Q6H PRN Dereje Banuelos MD      Or    OLANZapine  5 mg Intramuscular Q6H PRN Dereje Banuelos MD      ondansetron  4 mg Oral Q6H PRN Darin Lawton MD      polyethylene glycol  17 g Oral Daily PRN Marie Ziegler, CRVALE      polyethylene glycol  17 g Oral Daily Kristen Logan, JOSE ELIAS      prazosin  2 mg Oral HS Marie Ziegler, JOSE ELIAS      senna-docusate sodium  2 tablet Oral HS Rehana Borrego, YENNY      traZODone  50 mg Oral HS Marie Ziegler, JOSE ELIAS         Risks / Benefits of Treatment:    Risks, benefits, and possible side  effects of medications explained to patient and patient verbalizes understanding and agreement for treatment.    Subjective:    Behavior over the last 24 hours: unchanged.     Per staff Meli was preoccupied with radio yesterday.  She remains religiously preoccupied and tangential.  She is compliant with meals and medications.    Meli was seen in her room today for psychiatric follow up.  She remains tangential with increased rate of speech.  Her insight remains poor reporting today that she does not feel she will need to take medications when she leaves the hospital.  She was perseverative about discharge today but also states that she does not want to leave.  She remains disorganized and illogical.  She denies suicidal or homicidal ideation plan or intent.  She denies hallucinations today but appears distracted.      Sleep: frequent awakenings  Appetite: normal  Medication side effects: No   ROS: no complaints    Mental Status Evaluation:    Appearance:  disheveled, marginal hygiene   Behavior:  calm, demanding   Speech:  increased rate, disorganized, perseverative   Mood:  anxious   Affect:  constricted   Thought Process:  disorganized, illogical, tangential   Associations: tangential associations   Thought Content:  paranoid ideation, negative thinking, ruminations   Perceptual Disturbances: denies auditory or visual hallucinations when asked, appears distracted   Risk Potential: Suicidal ideation - None at present  Homicidal ideation - None  Potential for aggression - No   Sensorium:  oriented to person and situation   Memory:  recent memory intact   Consciousness:  alert and awake   Attention/Concentration: attention span and concentration appear shorter than expected for age   Insight:  limited   Judgment: limited   Gait/Station: in bed   Motor Activity: no abnormal movements     Vital signs in last 24 hours:    Temp:  [98.2 °F (36.8 °C)-99.8 °F (37.7 °C)] 99.8 °F (37.7 °C)  HR:  [79-97] 97  Resp:  [17-18]  18  BP: (112-137)/(69-76) 137/69         Laboratory results: I have personally reviewed all pertinent laboratory/tests results    Results from the past 24 hours: No results found for this or any previous visit (from the past 24 hour(s)).  Most Recent Labs:   Lab Results   Component Value Date    WBC 7.53 09/16/2024    RBC 4.35 09/16/2024    HGB 13.1 09/16/2024    HCT 41.3 09/16/2024     09/16/2024    RDW 15.2 (H) 09/16/2024    NEUTROABS 3.86 09/16/2024    SODIUM 136 09/20/2024    K 3.9 09/20/2024     09/20/2024    CO2 26 09/20/2024    BUN 17 09/20/2024    CREATININE 0.86 09/20/2024    GLUC 198 (H) 09/20/2024    CALCIUM 9.6 09/20/2024    AST 21 09/20/2024    ALT 30 09/20/2024    ALKPHOS 96 09/20/2024    TP 7.2 09/20/2024    ALB 4.2 09/20/2024    TBILI 0.34 09/20/2024    AMMONIA 32 07/03/2024    RQL1ZHSDGVWC 2.000 07/24/2024    HGBA1C 6.4 (H) 05/03/2024     05/03/2024       Suicide/Homicide Risk Assessment:    Risk of Harm to Self:   Nursing Suicide Risk Assessment Last 24 hours: C-SSRS Risk (Since Last Contact)  Calculated C-SSRS Risk Score (Since Last Contact): No Risk Indicated  Current Specific Risk Factors include: diagnosis of mood disorder, current psychotic symptoms  Protective Factors: no current suicidal ideation, ability to communicate with staff on the unit, able to contract for safety on the unit, taking medications as ordered on the unit, [employer] beliefs discouraging suicide  Based on today's assessment, Meli presents the following risk of harm to self: low    Risk of Harm to Others:  Nursing Homicide Risk Assessment: Violence Risk to Others: Denies within past 6 months  Current Specific Risk Factors include: unstable mood disorder, social difficulties  Protective Factors: no current homicidal ideation, psychotic symptoms are less prominent, compliant with medications on the unit as ordered, follows staff redirection  Based on today's assessment, Meli presents the following risk of  harm to others: low    The following interventions are recommended: behavioral checks every 7 minutes, continued hospitalization on locked unit    Progress Toward Goals: insight remains poor    Counseling / Coordination of Care:    Total floor / unit time spent today 15 minutes. Greater than 50% of total time was spent with the patient and / or family counseling and / or coordination of care. A description of counseling / coordination of care:    Administrative Statements   I have spent a total time of 30 minutes in caring for this patient on the day of the visit/encounter including Documenting in the medical record, Reviewing / ordering tests, medicine, procedures  , and Obtaining or reviewing history  .    JOSE ELIAS Malik 09/22/24

## 2024-09-23 LAB
BASOPHILS # BLD AUTO: 0.09 THOUSANDS/ΜL (ref 0–0.1)
BASOPHILS NFR BLD AUTO: 1 % (ref 0–1)
CK SERPL-CCNC: 29 U/L (ref 26–192)
CLOZAPINE SERPL-MCNC: 647 NG/ML (ref 350–900)
CRP SERPL QL: 4.5 MG/L
EOSINOPHIL # BLD AUTO: 0 THOUSAND/ΜL (ref 0–0.61)
EOSINOPHIL NFR BLD AUTO: 0 % (ref 0–6)
ERYTHROCYTE [DISTWIDTH] IN BLOOD BY AUTOMATED COUNT: 15.1 % (ref 11.6–15.1)
HCT VFR BLD AUTO: 41.1 % (ref 34.8–46.1)
HGB BLD-MCNC: 13.7 G/DL (ref 11.5–15.4)
IMM GRANULOCYTES # BLD AUTO: 0.04 THOUSAND/UL (ref 0–0.2)
IMM GRANULOCYTES NFR BLD AUTO: 0 % (ref 0–2)
LYMPHOCYTES # BLD AUTO: 3.2 THOUSANDS/ΜL (ref 0.6–4.47)
LYMPHOCYTES NFR BLD AUTO: 31 % (ref 14–44)
MCH RBC QN AUTO: 30.2 PG (ref 26.8–34.3)
MCHC RBC AUTO-ENTMCNC: 33.3 G/DL (ref 31.4–37.4)
MCV RBC AUTO: 91 FL (ref 82–98)
MONOCYTES # BLD AUTO: 1.05 THOUSAND/ΜL (ref 0.17–1.22)
MONOCYTES NFR BLD AUTO: 10 % (ref 4–12)
NEUTROPHILS # BLD AUTO: 5.89 THOUSANDS/ΜL (ref 1.85–7.62)
NEUTS SEG NFR BLD AUTO: 58 % (ref 43–75)
NRBC BLD AUTO-RTO: 0 /100 WBCS
PLATELET # BLD AUTO: 304 THOUSANDS/UL (ref 149–390)
PMV BLD AUTO: 9.5 FL (ref 8.9–12.7)
RBC # BLD AUTO: 4.53 MILLION/UL (ref 3.81–5.12)
WBC # BLD AUTO: 10.27 THOUSAND/UL (ref 4.31–10.16)

## 2024-09-23 PROCEDURE — 82550 ASSAY OF CK (CPK): CPT

## 2024-09-23 PROCEDURE — 80159 DRUG ASSAY CLOZAPINE: CPT

## 2024-09-23 PROCEDURE — 99232 SBSQ HOSP IP/OBS MODERATE 35: CPT | Performed by: STUDENT IN AN ORGANIZED HEALTH CARE EDUCATION/TRAINING PROGRAM

## 2024-09-23 PROCEDURE — 86140 C-REACTIVE PROTEIN: CPT

## 2024-09-23 PROCEDURE — 85025 COMPLETE CBC W/AUTO DIFF WBC: CPT

## 2024-09-23 RX ORDER — FLUOXETINE 20 MG/5ML
80 SOLUTION ORAL DAILY
Status: DISCONTINUED | OUTPATIENT
Start: 2024-09-24 | End: 2024-09-25

## 2024-09-23 RX ORDER — DIVALPROEX SODIUM 500 MG/1
500 TABLET, FILM COATED, EXTENDED RELEASE ORAL
Status: DISCONTINUED | OUTPATIENT
Start: 2024-09-23 | End: 2024-09-27

## 2024-09-23 RX ADMIN — FLUTICASONE FUROATE 1 PUFF: 100 POWDER RESPIRATORY (INHALATION) at 08:12

## 2024-09-23 RX ADMIN — PRAZOSIN HYDROCHLORIDE 2 MG: 1 CAPSULE ORAL at 21:34

## 2024-09-23 RX ADMIN — Medication 15 ML: at 08:12

## 2024-09-23 RX ADMIN — SENNOSIDES AND DOCUSATE SODIUM 2 TABLET: 8.6; 5 TABLET ORAL at 21:34

## 2024-09-23 RX ADMIN — FAMOTIDINE 20 MG: 20 TABLET ORAL at 08:13

## 2024-09-23 RX ADMIN — POLYETHYLENE GLYCOL 3350 17 G: 17 POWDER, FOR SOLUTION ORAL at 08:12

## 2024-09-23 RX ADMIN — LORAZEPAM 0.5 MG: 0.5 TABLET ORAL at 17:14

## 2024-09-23 RX ADMIN — CARVEDILOL 6.25 MG: 6.25 TABLET, FILM COATED ORAL at 08:07

## 2024-09-23 RX ADMIN — MELATONIN TAB 3 MG 3 MG: 3 TAB at 21:34

## 2024-09-23 RX ADMIN — TRAZODONE HYDROCHLORIDE 50 MG: 50 TABLET ORAL at 21:34

## 2024-09-23 RX ADMIN — NICOTINE 1 PATCH: 7 PATCH, EXTENDED RELEASE TRANSDERMAL at 08:13

## 2024-09-23 RX ADMIN — FLUOXETINE 120 MG: 20 SOLUTION ORAL at 08:12

## 2024-09-23 RX ADMIN — CYANOCOBALAMIN TAB 1000 MCG 1000 MCG: 1000 TAB at 08:13

## 2024-09-23 RX ADMIN — LORAZEPAM 0.5 MG: 0.5 TABLET ORAL at 08:13

## 2024-09-23 RX ADMIN — ATORVASTATIN CALCIUM 10 MG: 10 TABLET, FILM COATED ORAL at 08:13

## 2024-09-23 RX ADMIN — DIVALPROEX SODIUM 500 MG: 500 TABLET, EXTENDED RELEASE ORAL at 21:34

## 2024-09-23 RX ADMIN — FAMOTIDINE 20 MG: 20 TABLET ORAL at 17:14

## 2024-09-23 RX ADMIN — CARVEDILOL 6.25 MG: 6.25 TABLET, FILM COATED ORAL at 17:14

## 2024-09-23 RX ADMIN — CLOZAPINE 650 MG: 25 TABLET ORAL at 21:34

## 2024-09-23 NOTE — ASSESSMENT & PLAN NOTE
Progress towards goals  Awaiting CRR placement  Off titrate Prozac due to manic symptoms, Introduce Depakote 500 mg qhs for mood stabilization. VPA level to be obtained 9/26/2024  Continue medications as prescribed  Clozaril 650 mg nightly for schizoaffective disorder, Clozaril level 9/16 was 541, last ANC on 9/16 was 3.86    Ativan 0.5 mg twice daily for anxiety and paranoia  Melatonin 3 mg nightly for insomnia  Prazosin 2 mg nightly for PTSD associated nightmares   trazodone 50 mg nightly for insomnia    No associated orders from this encounter found during lookback period of 72 hours.

## 2024-09-23 NOTE — TREATMENT TEAM
Pt attended AM groups.  Pt able to self express with prompting.  Pt lacks insight and focused on coloring sheets to calm self.  Pt able to stay for group and calmer,  Pt dressed in hospital gown.     09/23/24 1000   Activity/Group Checklist   Group Community meeting   Attendance Attended   Attendance Duration (min) 31-45   Interactions Interacted appropriately   Affect/Mood Appropriate   Goals Achieved Identified feelings;Identified triggers;Identified relapse prevention strategies;Able to listen to others;Able to engage in interactions;Increased hopefulness;Discussed self-esteem issues;Discussed coping strategies;Verbalized increased hopefulness;Able to reflect/comment on own behavior;Able to manage/cope with feelings;Able to self-disclose;Able to recieve feedback

## 2024-09-23 NOTE — PROGRESS NOTES
Progress Note - Behavioral Health   Name: Meli Nowak 59 y.o. female I MRN: [id number]  Unit/Bed#: [facility] 651-01 I Date of Admission: 7/23/2024   Date of Service: 9/23/2024 I Hospital Day: 62     Assessment & Plan  Schizoaffective disorder, bipolar type (HCC)  Progress towards goals  Awaiting CRR placement  Off titrate Prozac due to manic symptoms, Introduce Depakote 500 mg qhs for mood stabilization. VPA level to be obtained 9/26/2024  Continue medications as prescribed  Clozaril 650 mg nightly for schizoaffective disorder, Clozaril level 9/16 was 541, last ANC on 9/16 was 3.86    Ativan 0.5 mg twice daily for anxiety and paranoia  Melatonin 3 mg nightly for insomnia  Prazosin 2 mg nightly for PTSD associated nightmares   trazodone 50 mg nightly for insomnia    No associated orders from this encounter found during lookback period of 72 hours.        Plan   Progress Toward Goals:   Meli is progressing towards goals of inpatient psychiatric treatment by continued medication compliance and is attending therapeutic modalities on the milieu. However, the patient continues to require inpatient psychiatric hospitalization for continued medication management and titration to optimize symptom reduction, improve sleep hygiene, and demonstrate adequate self-care.    Recommended Treatment: Treatment plan and medication changes discussed and per the attending physician the plan is:    1.Continue with group therapy, milieu therapy and occupational therapy  2.Behavioral Health checks every 7 minutes  3.Continue frequent safety checks and vitals per unit protocol  4.Continue with SLIM medical management as indicated  5.Continue with current medication regimen  6.Will review labs in the a.m.  7.Disposition Planning: Discharge planning and efforts remain ongoing    Behavioral Health Medications: all current active meds have been reviewed and continue current psychiatric medications.  Current Facility-Administered Medications    Medication Dose Route Frequency Provider Last Rate    acetaminophen  650 mg Oral Q4H PRN Marie Ziegler, CRNP      acetaminophen  650 mg Oral Q4H PRN Marie Ziegler, CRNP      acetaminophen  975 mg Oral Q6H PRN Marie Ziegler, JOSE ELIAS      aluminum-magnesium hydroxide-simethicone  30 mL Oral Q4H PRN Parris Bolanos, JOSE ELIAS      Artificial Tears  1 drop Both Eyes Q6H PRN Dereje Banuelos MD      atorvastatin  10 mg Oral Daily Marie Ziegler, JOSE ELIAS      bisacodyl  10 mg Rectal Daily PRN Marie Ziegler, JOSE ELIAS      carvedilol  6.25 mg Oral BID With Meals Marie Ziegler, JOSE ELIAS      cloZAPine  650 mg Oral HS Marie Ziegler, JOSE ELIAS      cyanocobalamin  1,000 mcg Oral Daily Marie Ziegler, JOSE ELIAS      divalproex sodium  500 mg Oral HS Dereje Banuelos MD      famotidine  20 mg Oral BID Shan Fitzgerald DO      [START ON 9/24/2024] FLUoxetine  80 mg Oral Daily Dereje Banuelos MD      fluticasone  1 puff Inhalation Daily JOSE ELIAS Figueroa      hydrOXYzine HCL  25 mg Oral Q6H PRN Max 4/day Marie Ziegler, JOSE ELIAS      hydrOXYzine HCL  50 mg Oral Q6H PRN Max 4/day JOSE ELIAS Fiugeroa      ipratropium-albuterol  3 mL Nebulization Q4H PRN Darin Lawton MD      LORazepam  0.5 mg Oral BID Marie Ziegler, JOSE ELIAS      Or    LORazepam  0.5 mg Intramuscular BID JOSE ELIAS Figueroa      LORazepam  1 mg Intramuscular Q6H PRN Max 3/day Marie Ziegler, JOSE ELIAS      LORazepam  1 mg Oral Q6H PRN Max 3/day JOSE ELIAS Figueroa      melatonin  3 mg Oral HS Marie Ziegler, JOSE ELIAS      mirtazapine  7.5 mg Oral HS PRN JOSE ELIAS Figueroa      Multivitamin  15 mL Oral Daily Marie Ziegler, JOSE ELIAS      nicotine  1 patch Transdermal Daily Marie Ziegler, JOSE ELIAS      OLANZapine  2.5 mg Oral Q6H PRN Dereje Banuelos MD      Or    OLANZapine  2.5 mg Intramuscular Q6H PRN Dereje Banuelos MD      OLANZapine  5 mg Oral Q6H PRN Dereje Banuelos MD      Or    OLANZapine  5 mg Intramuscular Q6H PRN Dereje Banuelos MD      ondansetron  4 mg Oral Q6H PRN Darin  "TIANA Lawton MD      polyethylene glycol  17 g Oral Daily PRN JOSE ELIAS Figueroa      polyethylene glycol  17 g Oral Daily JOSE ELIAS Ortega      prazosin  2 mg Oral HS JOSE ELIAS Figueroa      senna-docusate sodium  2 tablet Oral HS Rehana Borrego PA-C      traZODone  50 mg Oral HS JOSE ELIAS Figueroa         Risks / Benefits of Treatment:  Risks, benefits, and possible side effects of medications explained to patient and patient verbalizes understanding and agreement for treatment.       Subjective    Patient was seen today for continuation of care, records reviewed and patient was discussed with the morning case review team.    Meli was seen today for psychiatric follow-up.  On assessment today, Meli was more disorganized, tangential with pressured speech and flight of ideas.  Very difficult to redirect, patient also endorses auditory hallucinations which she states are \" all the time\" and inability to sleep.  We will off titrate Prozac due to increase in manic symptoms and implement Depakote 500 mg nightly for mood stabilization.  EPA level to be obtained on 9/26/2024.  Will also continue with clozapine 650 mg nightly to assist with schizoaffective disorder including paranoia, auditory hallucinations and Anabaptist/somatic preoccupation.  Weekly labs obtained this morning, WBC mildly elevated.  Slim made aware I will continue to follow.  Clozapine level also to be obtained this evening, we will consider possible up titration due to increase in symptoms.  Tentative discharge ongoing as patient continues to await CRR placement.    Meli denies acute suicidal/self-harm ideation/intent/plan upon direct inquiry at this time. Impulse control is intact.  Meli remains adherent to her current psychotropic medication regimen and denies any side effects from medications, as well as none noted on exam.    Psychiatric Review of Systems:  Behavior over the last 24 hours:  unchanged.   Sleep: " impaired  Appetite: good  Medication side effects: none  ROS: no complaints, denies shortness of breath or chest pain and all other systems are negative for acute changes        Objective    Vitals:  Vitals:    09/23/24 0723   BP: 135/81   Pulse: 91   Resp: 16   Temp: 98.1 °F (36.7 °C)   SpO2: 96%       Laboratory Results:  I have personally reviewed all pertinent laboratory/tests results.  Labs in last 72 hours:   Recent Labs     09/23/24  0530   WBC 10.27*   RBC 4.53   HGB 13.7   HCT 41.1      RDW 15.1   NEUTROABS 5.89       Mental Status Evaluation:  Appearance:  disheveled, marginal hygiene, overweight   Behavior:  bizarre, slightly more agitated   Speech:  pressured, tangential, disorganized   Mood:  manic   Affect:  constricted   Thought Process:  disorganized, illogical, tangential, flight of ideas, concrete   Thought Content:  Nondenominational and somatic preoccupation, paranoid ideation, negative thoughts, ruminating thoughts   Perceptual Disturbances: auditory hallucinations, chronic   Risk Potential: Suicidal ideation - None  Homicidal ideation - None  Potential for aggression - No   Memory:  recent and remote memory grossly intact   Sensorium  person, place, and time/date      Consciousness:  alert and awake   Attention: attention span and concentration are age appropriate   Insight:  limited   Judgment: limited   Gait/Station: normal gait/station   Motor Activity: no abnormal movements       Counseling / Coordination of Care:  Patient's progress reviewed with nursing staff.  Medications, treatment progress and treatment plan reviewed with patient.  Supportive counseling provided to the patient.    This note was completed in part utilizing Dragon dictation Software. Grammatical, translation, syntax errors, random word insertions, spelling mistakes, and incomplete sentences may be an occasional consequence of this system secondary to software limitations with voice recognition, ambient noise, and hardware  issues. If you have any questions or concerns about the content, text, or information contained within the body of this dictation, please contact the provider for clarification

## 2024-09-23 NOTE — NURSING NOTE
Pt cooperative yet preoccupied with the radio or one of the male peers Present in milieu watching television at times then withdraws to room to listen to music. Medication and meal compliant. Denies SI/Hi. Q 7 minute checks maintained.

## 2024-09-23 NOTE — NURSING NOTE
Patient is visible on the unit and social with select peers. She is seen uncontrollably laughing and points to her head when asked what she's laughing at. Patient has a labile mood. She is paranoid and suspicious with a disorganized thought process. She is medication compliant but pauses before taking it and appears internally preoccupied. Encouraged to inform staff of any needs or concerns.

## 2024-09-23 NOTE — PROGRESS NOTES
09/23/24 0821   Team Meeting   Meeting Type Daily Rounds   Initial Conference Date 09/23/24   Next Conference Date 09/24/24   Team Members Present   Team Members Present Physician;Nurse;;   Physician Team Member Dr Banuelos, JAIR Goldman   Nursing Team Member Clarissa   Care Management Team Member Anum   Social Work Team Member Ayse   Patient/Family Present   Patient Present No   Patient's Family Present No     Discharge pending CRR, bizarre, focused on radio and one peer, taking meds.

## 2024-09-23 NOTE — NURSING NOTE
Pt anxious and disorganized but redirectable and med-compliant with good appetite and steady gait.  VSS.  Attended group.  Guarded affect.  Monitored for safety and support.

## 2024-09-23 NOTE — PLAN OF CARE
Problem: Alteration in Thoughts and Perception  Goal: Treatment Goal: Gain control of psychotic behaviors/thinking, reduce/eliminate presenting symptoms and demonstrate improved reality functioning upon discharge  Outcome: Progressing  Goal: Verbalize thoughts and feelings  Description: Interventions:  - Promote a nonjudgmental and trusting relationship with the patient through active listening and therapeutic communication  - Assess patient's level of functioning, behavior and potential for risk  - Engage patient in 1 on 1 interactions  - Encourage patient to express fears, feelings, frustrations, and discuss symptoms    - [place] patient to reality, help patient recognize reality-based thinking   - Administer medications as ordered and assess for potential side effects  - Provide the patient education related to the signs and symptoms of the illness and desired effects of prescribed medications  Outcome: Progressing  Goal: Refrain from acting on delusional thinking/internal stimuli  Description: Interventions:  - Monitor patient closely, per order   - Utilize least restrictive measures   - Set reasonable limits, give positive feedback for acceptable   - Administer medications as ordered and monitor of potential side effects  Outcome: Progressing  Goal: Agree to be compliant with medication regime, as prescribed and report medication side effects  Description: Interventions:  - Offer appropriate PRN medication and supervise ingestion; conduct AIMS, as needed   Outcome: Progressing  Goal: Attend and participate in unit activities, including therapeutic, recreational, and educational groups  Description: Interventions:  -Encourage Visitation and family involvement in care  Outcome: Progressing  Goal: Recognize dysfunctional thoughts, communicate reality-based thoughts at the time of discharge  Description: Interventions:  - Provide medication and psycho-education to assist patient in compliance and developing  insight into his/her illness   Outcome: Progressing  Goal: Complete daily ADLs, including personal hygiene independently, as able  Description: Interventions:  - Observe, teach, and assist patient with ADLS  - Monitor and promote a balance of rest/activity, with adequate nutrition and elimination   Outcome: Progressing     Problem: Risk for Self Injury/Neglect  Goal: Treatment Goal: Remain safe during length of stay, learn and adopt new coping skills, and be free of self-injurious ideation, impulses and acts at the time of discharge  Outcome: Progressing  Goal: Verbalize thoughts and feelings  Description: Interventions:  - Assess and re-assess patient's lethality and potential for self-injury  - Engage patient in 1:1 interactions, daily, for a minimum of 15 minutes  - Encourage patient to express feelings, fears, frustrations, hopes  - Establish rapport/trust with patient   Outcome: Progressing  Goal: Refrain from harming self  Description: Interventions:  - Monitor patient closely, per order  - Develop a trusting relationship  - Supervise medication ingestion, monitor effects and side effects   Outcome: Progressing  Goal: Attend and participate in unit activities, including therapeutic, recreational, and educational groups  Description: Interventions:  - Provide therapeutic and educational activities daily, encourage attendance and participation, and document same in the medical record  - Obtain collateral information, encourage visitation and family involvement in care   Outcome: Progressing  Goal: Recognize maladaptive responses and adopt new coping mechanisms  Outcome: Progressing     Problem: Depression  Goal: Treatment Goal: Demonstrate behavioral control of depressive symptoms, verbalize feelings of improved mood/affect, and adopt new coping skills prior to discharge  Outcome: Progressing  Goal: Verbalize thoughts and feelings  Description: Interventions:  - Assess and re-assess patient's level of risk   -  Engage patient in 1:1 interactions, daily, for a minimum of 15 minutes   - Encourage patient to express feelings, fears, frustrations, hopes   Outcome: Progressing  Goal: Refrain from isolation  Description: Interventions:  - Develop a trusting relationship   - Encourage socialization   Outcome: Progressing  Goal: Refrain from self-neglect  Outcome: Progressing  Goal: Attend and participate in unit activities, including therapeutic, recreational, and educational groups  Description: Interventions:  - Provide therapeutic and educational activities daily, encourage attendance and participation, and document same in the medical record   Outcome: Progressing     Problem: Anxiety  Goal: Anxiety is at manageable level  Description: Interventions:  - Assess and monitor patient's anxiety level.   - Monitor for signs and symptoms (heart palpitations, chest pain, shortness of breath, headaches, nausea, feeling jumpy, restlessness, irritable, apprehensive).   - Collaborate with interdisciplinary team and initiate plan and interventions as ordered.  - [place] patient to unit/surroundings  - Explain treatment plan  - Encourage participation in care  - Encourage verbalization of concerns/fears  - Identify coping mechanisms  - Assist in developing anxiety-reducing skills  - Administer/offer alternative therapies  - Limit or eliminate stimulants  Outcome: Progressing

## 2024-09-24 PROCEDURE — 99232 SBSQ HOSP IP/OBS MODERATE 35: CPT | Performed by: STUDENT IN AN ORGANIZED HEALTH CARE EDUCATION/TRAINING PROGRAM

## 2024-09-24 RX ADMIN — HYDROXYZINE HYDROCHLORIDE 50 MG: 50 TABLET, FILM COATED ORAL at 05:45

## 2024-09-24 RX ADMIN — ATORVASTATIN CALCIUM 10 MG: 10 TABLET, FILM COATED ORAL at 08:32

## 2024-09-24 RX ADMIN — Medication 15 ML: at 08:35

## 2024-09-24 RX ADMIN — FLUTICASONE FUROATE 1 PUFF: 100 POWDER RESPIRATORY (INHALATION) at 08:35

## 2024-09-24 RX ADMIN — CYANOCOBALAMIN TAB 1000 MCG 1000 MCG: 1000 TAB at 08:32

## 2024-09-24 RX ADMIN — FAMOTIDINE 20 MG: 20 TABLET ORAL at 17:22

## 2024-09-24 RX ADMIN — PRAZOSIN HYDROCHLORIDE 2 MG: 1 CAPSULE ORAL at 21:07

## 2024-09-24 RX ADMIN — LORAZEPAM 0.5 MG: 0.5 TABLET ORAL at 08:32

## 2024-09-24 RX ADMIN — LORAZEPAM 0.5 MG: 0.5 TABLET ORAL at 17:22

## 2024-09-24 RX ADMIN — MELATONIN TAB 3 MG 3 MG: 3 TAB at 21:07

## 2024-09-24 RX ADMIN — NICOTINE 1 PATCH: 7 PATCH, EXTENDED RELEASE TRANSDERMAL at 08:36

## 2024-09-24 RX ADMIN — FAMOTIDINE 20 MG: 20 TABLET ORAL at 08:32

## 2024-09-24 RX ADMIN — TRAZODONE HYDROCHLORIDE 50 MG: 50 TABLET ORAL at 21:07

## 2024-09-24 RX ADMIN — DIVALPROEX SODIUM 500 MG: 500 TABLET, EXTENDED RELEASE ORAL at 21:07

## 2024-09-24 RX ADMIN — FLUOXETINE 80 MG: 20 SOLUTION ORAL at 08:35

## 2024-09-24 RX ADMIN — POLYETHYLENE GLYCOL 3350 17 G: 17 POWDER, FOR SOLUTION ORAL at 08:36

## 2024-09-24 RX ADMIN — ALUMINUM HYDROXIDE, MAGNESIUM HYDROXIDE, AND DIMETHICONE 30 ML: 200; 20; 200 SUSPENSION ORAL at 11:45

## 2024-09-24 RX ADMIN — SENNOSIDES AND DOCUSATE SODIUM 2 TABLET: 8.6; 5 TABLET ORAL at 21:07

## 2024-09-24 RX ADMIN — CARVEDILOL 6.25 MG: 6.25 TABLET, FILM COATED ORAL at 08:32

## 2024-09-24 RX ADMIN — CLOZAPINE 700 MG: 200 TABLET ORAL at 21:07

## 2024-09-24 RX ADMIN — CARVEDILOL 6.25 MG: 6.25 TABLET, FILM COATED ORAL at 17:22

## 2024-09-24 NOTE — NURSING NOTE
"Patient was resistive to HS lab work being drawn. She believed that staff was \"extracting her \" stating \"it's the only way I get sex.\" She also stated \"if this is the price I have to pay to get to [facility] then I'll just stay here!\" She continued to yell delusional statements and scream at staff during the whole procedure.   "

## 2024-09-24 NOTE — NURSING NOTE
Patient awake and c/o anxiety from blood draw last pm. Patient demanding an Ativan. Atarax administered as per prn order for moderate anxiety. Will monitor.

## 2024-09-24 NOTE — NURSING NOTE
Patient is pleasant and cooperative , with poor concentration. Her behaviors and attitude is suspicious, paranoid and hyper verbal with loose associations. . Patient has bizarre appearance with worried affect. Good medication compliance. Impulsive behaviors not  observed. She reported voices and stating She would like to know what is real, If there will be end of the world, than asked nurse if she can put make up on her. Denies SI,Hi.

## 2024-09-24 NOTE — PROGRESS NOTES
Progress Note - Behavioral Health   Name: Meli Nowak 59 y.o. female I MRN: [id number]  Unit/Bed#: [facility] 651-01 I Date of Admission: 7/23/2024   Date of Service: 9/24/2024 I Hospital Day: 63     Assessment & Plan  Schizoaffective disorder, bipolar type (HCC)  Progress towards goals  Awaiting CRR placement  Off titrate Prozac due to manic symptoms, Introduce Depakote 500 mg qhs for mood stabilization. VPA level to be obtained 9/26/2024  Continue medications as prescribed  Clozaril 650 mg nightly for schizoaffective disorder, Clozaril level 9/16 was 541, last ANC on 9/16 was 3.86    Ativan 0.5 mg twice daily for anxiety and paranoia  Melatonin 3 mg nightly for insomnia  Prazosin 2 mg nightly for PTSD associated nightmares   trazodone 50 mg nightly for insomnia    No associated orders from this encounter found during lookback period of 72 hours.        Plan   Progress Toward Goals:   Meli is progressing towards goals of inpatient psychiatric treatment by continued medication compliance and is attending therapeutic modalities on the milieu. However, the patient continues to require inpatient psychiatric hospitalization for continued medication management and titration to optimize symptom reduction, improve sleep hygiene, and demonstrate adequate self-care.    Recommended Treatment: Treatment plan and medication changes discussed and per the attending physician the plan is:    1.Continue with group therapy, milieu therapy and occupational therapy  2.Behavioral Health checks every 7 minutes  3.Continue frequent safety checks and vitals per unit protocol  4.Continue with SLIM medical management as indicated  5.Continue with current medication regimen  6.Will review labs in the a.m.  7.Disposition Planning: Discharge planning and efforts remain ongoing    Behavioral Health Medications: all current active meds have been reviewed and continue current psychiatric medications.  Current Facility-Administered Medications    Medication Dose Route Frequency Provider Last Rate    acetaminophen  650 mg Oral Q4H PRN Marie Ziegler, CRNP      acetaminophen  650 mg Oral Q4H PRN Marie Ziegler, CRNP      acetaminophen  975 mg Oral Q6H PRN Marie Ziegler, CRNP      aluminum-magnesium hydroxide-simethicone  30 mL Oral Q4H PRN Parris Bolanos, CRNP      Artificial Tears  1 drop Both Eyes Q6H PRN Dereje Banuelos MD      atorvastatin  10 mg Oral Daily Marie Ziegler, CRNP      bisacodyl  10 mg Rectal Daily PRN Marie Ziegler, CRNP      carvedilol  6.25 mg Oral BID With Meals Marie Ziegler, CRNP      cloZAPine  650 mg Oral HS Marie Ziegler, CRNP      cyanocobalamin  1,000 mcg Oral Daily Marie Ziegler, CRNP      divalproex sodium  500 mg Oral HS Dereje Banuelos MD      famotidine  20 mg Oral BID Shan Fitzgerald, DO      FLUoxetine  80 mg Oral Daily Dereje Banuelos MD      fluticasone  1 puff Inhalation Daily Marie Ziegler, CRNP      hydrOXYzine HCL  25 mg Oral Q6H PRN Max 4/day Marie Ziegler, CRNP      hydrOXYzine HCL  50 mg Oral Q6H PRN Max 4/day Marie Ziegler, CRNP      ipratropium-albuterol  3 mL Nebulization Q4H PRN Darin Lawton MD      LORazepam  0.5 mg Oral BID Marie Ziegler, CRNP      Or    LORazepam  0.5 mg Intramuscular BID Marie Ziegler, CRNP      LORazepam  1 mg Intramuscular Q6H PRN Max 3/day Marie Ziegler, CRNP      LORazepam  1 mg Oral Q6H PRN Max 3/day Marie Ziegler, CRNP      melatonin  3 mg Oral HS Marie Ziegler, CRNP      mirtazapine  7.5 mg Oral HS PRN Marie Ziegler, CRNP      Multivitamin  15 mL Oral Daily Marie Ziegler, CRNP      nicotine  1 patch Transdermal Daily Marie Ziegler, CRNP      OLANZapine  2.5 mg Oral Q6H PRN Dereje Banuelos MD      Or    OLANZapine  2.5 mg Intramuscular Q6H PRN Dereje Banuelos MD      OLANZapine  5 mg Oral Q6H PRN Dereje Banuelos MD      Or    OLANZapine  5 mg Intramuscular Q6H PRN Dereje Banuelos MD      ondansetron  4 mg Oral Q6H PRN Darin Lawton MD       "polyethylene glycol  17 g Oral Daily PRN JOSE ELIAS Figueroa      polyethylene glycol  17 g Oral Daily Kristen Tomasjimmy Logan, JOSE ELIAS      prazosin  2 mg Oral HS JOSE ELIAS Figueroa      senna-docusate sodium  2 tablet Oral HS Rehana Borrego PA-C      traZODone  50 mg Oral HS JOSE ELIAS Figueroa         Risks / Benefits of Treatment:  Patient is not likely to improve without medications and requires administration of injectable medications against she will for refusal of oral medications to assure safety of self and others       Subjective    Patient was seen today for continuation of care, records reviewed and patient was discussed with the morning case review team.    Meli was seen today for psychiatric follow-up.  On assessment today, Meli was disorganized, tangential and pressured speech..  Verbal statements made overnight about her \"\" and how staff was removing him with blood work.  Patient directly questioned about incident where as she stated \" I don't have a , do I?, If I did he would get me out of here\". Flight of ideas also noted on exam, patient continues to experience increase in champ. Prozac decreased with plan to off taper, Depakote 500 mg qhs also initiated on 9/23/2024 with VPA level to be obtained on 9/26/2024. Clozaril also to be up titrated to 700 mg daily secondary to increase in AH, disorganization and delusional behavior. Will continue with q Monday labs continue drug therapy.  Tentative discharge ongoing as patient will benefit from CRR placement.    Meli denies acute suicidal/self-harm ideation/intent/plan upon direct inquiry at this time.  Meli remains behaviorally appropriate, no agitation or aggression noted on exam or in report.  Impulse control is intact.  Meli remains adherent to her current psychotropic medication regimen and denies any side effects from medications, as well as none noted on exam.    Psychiatric Review of Systems:  Behavior over the last 24 " hours:  unchanged.   Sleep: better  Appetite: good  Medication side effects: none  ROS: no complaints, denies shortness of breath or chest pain and all other systems are negative for acute changes        Objective    Vitals:  Vitals:    09/24/24 0743   BP: 144/66   Pulse: 96   Resp: 18   Temp: 97.7 °F (36.5 °C)   SpO2: 95%       Laboratory Results:  I have personally reviewed all pertinent laboratory/tests results.    Mental Status Evaluation:  Appearance:  disheveled, marginal hygiene, overweight, bearded   Behavior:  bizarre, guarded, some agitation   Speech:  pressured, tangential   Mood:  anxious, irritable, manic   Affect:  constricted   Thought Process:  disorganized, illogical, circumstantial, flight of ideas, perseverative, concrete   Thought Content:  Congregational and somatic preoccupation, paranoid ideation, negative thoughts   Perceptual Disturbances: appears preoccupied, talks to self at times   Risk Potential: Suicidal ideation - None  Homicidal ideation - None  Potential for aggression - No   Memory:  recent and remote memory grossly intact   Sensorium  person, place, and time/date      Consciousness:  alert and awake   Attention: decreased concentration and decreased attention span   Insight:  impaired   Judgment: impaired   Gait/Station: normal gait/station   Motor Activity: no abnormal movements       Counseling / Coordination of Care:  Patient's progress reviewed with nursing staff.  Medications, treatment progress and treatment plan reviewed with patient.  Supportive counseling provided to the patient.    This note was completed in part utilizing Dragon dictation Software. Grammatical, translation, syntax errors, random word insertions, spelling mistakes, and incomplete sentences may be an occasional consequence of this system secondary to software limitations with voice recognition, ambient noise, and hardware issues. If you have any questions or concerns about the content, text, or information  contained within the body of this dictation, please contact the provider for clarification

## 2024-09-24 NOTE — NURSING NOTE
"Patient presents as anxious, paranoid, and delusional. She accused supervisor of being her ICM and asked \"Did you dump me here? What are you doing here?\" Patient is hyperverbal with loose associations and a disorganized thought process. She was found to be holding her evening medications under her tongue and blamed staff for \"trying to kill me by putting ice in my water.\" Patient then took lid off of her water cup and began sniffing her water. She was agreeable to swallowing medications after a lot of encouragement but told this writer \"Go home and have some fun and get away from me. I don't want to see you for another week.\" Encouraged to inform staff of any needs or concerns.    "

## 2024-09-24 NOTE — PROGRESS NOTES
09/24/24 0812   Team Meeting   Meeting Type Daily Rounds   Initial Conference Date 09/24/24   Next Conference Date 09/25/24   Team Members Present   Team Members Present Physician;Nurse;;   Physician Team Member Dr Banuelos, Dr Alberto, JAIR Mahoney   Nursing Team Member Clarissa   Care Management Team Member Anum   Social Work Team Member Ayse   Patient/Family Present   Patient Present No   Patient's Family Present No     Upset last night yelling out for spouse, states he was inside her and taken out with the lab draw, discharge pending CRR, tapering prozac, started depakote, pressured speech.

## 2024-09-24 NOTE — PLAN OF CARE
Problem: Prexisting or High Potential for Compromised Skin Integrity  Goal: Skin integrity is maintained or improved  Description: INTERVENTIONS:  - Identify patients at risk for skin breakdown  - Assess and monitor skin integrity  - Assess and monitor nutrition and hydration status  - Monitor labs   - Assess for incontinence   - Turn and reposition patient  - Assist with mobility/ambulation  - Relieve pressure over bony prominences  - Avoid friction and shearing  - Provide appropriate hygiene as needed including keeping skin clean and dry  - Evaluate need for skin moisturizer/barrier cream  - Collaborate with interdisciplinary team   - Patient/family teaching  - Consider wound care consult   Outcome: Progressing     Problem: Alteration in Thoughts and Perception  Goal: Verbalize thoughts and feelings  Description: Interventions:  - Promote a nonjudgmental and trusting relationship with the patient through active listening and therapeutic communication  - Assess patient's level of functioning, behavior and potential for risk  - Engage patient in 1 on 1 interactions  - Encourage patient to express fears, feelings, frustrations, and discuss symptoms    - [place] patient to reality, help patient recognize reality-based thinking   - Administer medications as ordered and assess for potential side effects  - Provide the patient education related to the signs and symptoms of the illness and desired effects of prescribed medications  Outcome: Progressing  Goal: Refrain from acting on delusional thinking/internal stimuli  Description: Interventions:  - Monitor patient closely, per order   - Utilize least restrictive measures   - Set reasonable limits, give positive feedback for acceptable   - Administer medications as ordered and monitor of potential side effects  Outcome: Not Progressing  Goal: Complete daily ADLs, including personal hygiene independently, as able  Description: Interventions:  - Observe, teach, and assist  patient with ADLS  - Monitor and promote a balance of rest/activity, with adequate nutrition and elimination   Outcome: Progressing     Problem: Ineffective Coping  Goal: Understands least restrictive measures  Description: Interventions:  - Utilize least restrictive behavior  Outcome: Progressing  Goal: Free from restraint events  Description: - Utilize least restrictive measures   - Provide behavioral interventions   - Redirect inappropriate behaviors   Outcome: Progressing     Problem: Depression  Goal: Treatment Goal: Demonstrate behavioral control of depressive symptoms, verbalize feelings of improved mood/affect, and adopt new coping skills prior to discharge  Outcome: Progressing  Goal: Refrain from isolation  Description: Interventions:  - Develop a trusting relationship   - Encourage socialization   Outcome: Progressing  Goal: Refrain from self-neglect  Outcome: Progressing     Problem: Nutrition/Hydration-ADULT  Goal: Nutrient/Hydration intake appropriate for improving, restoring or maintaining nutritional needs  Description: Monitor and assess patient's nutrition/hydration status for malnutrition. Collaborate with interdisciplinary team and initiate plan and interventions as ordered.  Monitor patient's weight and dietary intake as ordered or per policy. Utilize nutrition screening tool and intervene as necessary. Determine patient's food preferences and provide high-protein, high-caloric foods as appropriate.     INTERVENTIONS:  - Monitor oral intake, urinary output, labs, and treatment plans  - Assess nutrition and hydration status and recommend course of action  - Evaluate amount of meals eaten  - Assist patient with eating if necessary   - Allow adequate time for meals  - Recommend/ encourage appropriate diets, oral nutritional supplements, and vitamin/mineral supplements  - Order, calculate, and assess calorie counts as needed  - Recommend, monitor, and adjust tube feedings and TPN/PPN based on  assessed needs  - Assess need for intravenous fluids  - Provide specific nutrition/hydration education as appropriate  - Include patient/family/caregiver in decisions related to nutrition  Outcome: Progressing     Problem: Potential for Falls  Goal: Patient will remain free of falls  Description: INTERVENTIONS:  - Educate patient on patient safety including physical limitations  - Instruct patient to call for assistance with activity   - Consult OT/PT to assist with strengthening/mobility   - Keep Call bell within reach  - Keep bed low and locked with side rails adjusted as appropriate  - Keep care items and personal belongings within reach  - Initiate and maintain comfort rounds  - Offer Toileting every 2 Hours, in advance of need  - Initiate/Maintain bed and chair alarm  - Obtain necessary fall risk management equipment: walker, wheelchair  - Apply yellow socks and bracelet for high fall risk patients  - Patient moved to room near nurses station  Outcome: Progressing

## 2024-09-25 PROCEDURE — 99232 SBSQ HOSP IP/OBS MODERATE 35: CPT | Performed by: STUDENT IN AN ORGANIZED HEALTH CARE EDUCATION/TRAINING PROGRAM

## 2024-09-25 RX ORDER — FLUOXETINE 20 MG/5ML
60 SOLUTION ORAL DAILY
Status: DISCONTINUED | OUTPATIENT
Start: 2024-09-26 | End: 2024-09-27

## 2024-09-25 RX ADMIN — POLYETHYLENE GLYCOL 3350 17 G: 17 POWDER, FOR SOLUTION ORAL at 08:33

## 2024-09-25 RX ADMIN — NICOTINE 1 PATCH: 7 PATCH, EXTENDED RELEASE TRANSDERMAL at 08:33

## 2024-09-25 RX ADMIN — FAMOTIDINE 20 MG: 20 TABLET ORAL at 17:04

## 2024-09-25 RX ADMIN — Medication 15 ML: at 08:32

## 2024-09-25 RX ADMIN — CLOZAPINE 700 MG: 200 TABLET ORAL at 21:01

## 2024-09-25 RX ADMIN — LORAZEPAM 0.5 MG: 0.5 TABLET ORAL at 08:34

## 2024-09-25 RX ADMIN — MELATONIN TAB 3 MG 3 MG: 3 TAB at 21:02

## 2024-09-25 RX ADMIN — LORAZEPAM 0.5 MG: 0.5 TABLET ORAL at 17:05

## 2024-09-25 RX ADMIN — SENNOSIDES AND DOCUSATE SODIUM 2 TABLET: 8.6; 5 TABLET ORAL at 21:02

## 2024-09-25 RX ADMIN — CARVEDILOL 6.25 MG: 6.25 TABLET, FILM COATED ORAL at 16:44

## 2024-09-25 RX ADMIN — FLUTICASONE FUROATE 1 PUFF: 100 POWDER RESPIRATORY (INHALATION) at 08:34

## 2024-09-25 RX ADMIN — TRAZODONE HYDROCHLORIDE 50 MG: 50 TABLET ORAL at 21:02

## 2024-09-25 RX ADMIN — CYANOCOBALAMIN TAB 1000 MCG 1000 MCG: 1000 TAB at 08:33

## 2024-09-25 RX ADMIN — FAMOTIDINE 20 MG: 20 TABLET ORAL at 08:34

## 2024-09-25 RX ADMIN — DIVALPROEX SODIUM 500 MG: 500 TABLET, EXTENDED RELEASE ORAL at 21:02

## 2024-09-25 RX ADMIN — PRAZOSIN HYDROCHLORIDE 2 MG: 1 CAPSULE ORAL at 21:02

## 2024-09-25 RX ADMIN — FLUOXETINE 80 MG: 20 SOLUTION ORAL at 08:34

## 2024-09-25 RX ADMIN — ATORVASTATIN CALCIUM 10 MG: 10 TABLET, FILM COATED ORAL at 08:33

## 2024-09-25 RX ADMIN — CARVEDILOL 6.25 MG: 6.25 TABLET, FILM COATED ORAL at 08:33

## 2024-09-25 NOTE — PROGRESS NOTES
09/25/24 0806   Team Meeting   Meeting Type Daily Rounds   Initial Conference Date 09/25/24   Next Conference Date 09/26/24   Team Members Present   Team Members Present Physician;Nurse;;   Physician Team Member Dr Banuelos, Dr Alberto, Dr Garcia, JAIR Brock   Nursing Team Member Clarissa   Care Management Team Member Anum   Social Work Team Member Ayse   Patient/Family Present   Patient Present No   Patient's Family Present No     Discharge pending CRR, hyperverbal, tried cheeking meds in evening, prozac increased to 60, depakote level to be checked tomorrow.

## 2024-09-25 NOTE — PROGRESS NOTES
Progress Note - Behavioral Health   Name: Meli Nowak 59 y.o. female I MRN: [id number]  Unit/Bed#: [facility] 651-01 I Date of Admission: 7/23/2024   Date of Service: 9/25/2024 I Hospital Day: 64     Assessment & Plan  Schizoaffective disorder, bipolar type (HCC)  Progress towards goals  Awaiting CRR placement  Off titrate Prozac due to manic symptoms, Introduce Depakote 500 mg qhs for mood stabilization. VPA level to be obtained 9/26/2024  Continue medications as prescribed  Clozaril 650 mg nightly for schizoaffective disorder, Clozaril level 9/16 was 541, last ANC on 9/16 was 3.86    Ativan 0.5 mg twice daily for anxiety and paranoia  Melatonin 3 mg nightly for insomnia  Prazosin 2 mg nightly for PTSD associated nightmares   trazodone 50 mg nightly for insomnia    No associated orders from this encounter found during lookback period of 72 hours.        Plan   Progress Toward Goals:   Meli is progressing towards goals of inpatient psychiatric treatment by continued medication compliance and is attending therapeutic modalities on the milieu. However, the patient continues to require inpatient psychiatric hospitalization for continued medication management and titration to optimize symptom reduction, improve sleep hygiene, and demonstrate adequate self-care.    Recommended Treatment: Treatment plan and medication changes discussed and per the attending physician the plan is:    1.Continue with group therapy, milieu therapy and occupational therapy  2.Behavioral Health checks every 7 minutes  3.Continue frequent safety checks and vitals per unit protocol  4.Continue with SLIM medical management as indicated  5.Continue with current medication regimen  6.Will review labs in the a.m.  7.Disposition Planning: Discharge planning and efforts remain ongoing    Behavioral Health Medications: all current active meds have been reviewed and continue current psychiatric medications.  Current Facility-Administered Medications    Medication Dose Route Frequency Provider Last Rate    acetaminophen  650 mg Oral Q4H PRN Marie Ziegler, CRNP      acetaminophen  650 mg Oral Q4H PRN Marie Ziegler, CRNP      acetaminophen  975 mg Oral Q6H PRN Marie Ziegler, CRNP      aluminum-magnesium hydroxide-simethicone  30 mL Oral Q4H PRN Parris Bolanos, CRNP      Artificial Tears  1 drop Both Eyes Q6H PRN Dereje Banuelos MD      atorvastatin  10 mg Oral Daily Marie Ziegler, CRNP      bisacodyl  10 mg Rectal Daily PRN Marie Ziegler, CRNP      carvedilol  6.25 mg Oral BID With Meals Marie Ziegler, CRNP      cloZAPine  700 mg Oral HS Marie Ziegler, CRNP      cyanocobalamin  1,000 mcg Oral Daily Marie Ziegler, CRNP      divalproex sodium  500 mg Oral HS Dereje Banuelos MD      famotidine  20 mg Oral BID Shan Fitzgerald DO      [START ON 9/26/2024] FLUoxetine  60 mg Oral Daily Marie Ziegler, CRNP      fluticasone  1 puff Inhalation Daily Marie Ziegler, JOSE ELIAS      hydrOXYzine HCL  25 mg Oral Q6H PRN Max 4/day Marie Ziegler, CRNP      hydrOXYzine HCL  50 mg Oral Q6H PRN Max 4/day Marie Ziegler, CRNP      ipratropium-albuterol  3 mL Nebulization Q4H PRN Darin Lawton MD      LORazepam  0.5 mg Oral BID Marie Ziegler, CRNP      Or    LORazepam  0.5 mg Intramuscular BID Marie Ziegler, CRNP      LORazepam  1 mg Intramuscular Q6H PRN Max 3/day Marie Ziegler, CRNP      LORazepam  1 mg Oral Q6H PRN Max 3/day Marie Ziegler, CRNP      melatonin  3 mg Oral HS Marie Ziegler, CRNP      mirtazapine  7.5 mg Oral HS PRN Marie Ziegler, CRNP      Multivitamin  15 mL Oral Daily Marie Ziegler, CRNP      nicotine  1 patch Transdermal Daily Marie Ziegler, CRNP      OLANZapine  2.5 mg Oral Q6H PRN Dereje Banuelos MD      Or    OLANZapine  2.5 mg Intramuscular Q6H PRN Dereje Banuelos MD      OLANZapine  5 mg Oral Q6H PRN Dereje Banuelos MD      Or    OLANZapine  5 mg Intramuscular Q6H PRN Dereje Banuelos MD      ondansetron  4 mg Oral Q6H PRN  Darin Lawton MD      polyethylene glycol  17 g Oral Daily PRN JOSE ELIAS Figueroa      polyethylene glycol  17 g Oral Daily JOSE ELIAS Ortega      prazosin  2 mg Oral HS JOSE ELIAS Figueroa      senna-docusate sodium  2 tablet Oral HS Rehana Borrego PA-C      traZODone  50 mg Oral HS JOSE ELIAS Figueroa         Risks / Benefits of Treatment:  Risks, benefits, and possible side effects of medications explained to patient and patient verbalizes understanding and agreement for treatment.       Subjective    Patient was seen today for continuation of care, records reviewed and patient was discussed with the morning case review team.    Meli was seen today for psychiatric follow-up.  On assessment today, Meli was less manic with significant decrease in pressured speech.  Still paranoid, patient continues to be preoccupied with make-up and seeing her ICM.  More easily redirectable patient does report that she is increasingly tired, updated on current plan of care including recent introduction of Depakote to assist with mood stabilization. VPA level to be obtained tomorrow.  Prozac also to be decreased and off titrated due to schizoaffective disorder and increase in champ.  We will continue with Clozaril 700 mg daily, q. Monday labs remain within normal limits.  Mouth checks also ordered due to pocketing medication overnight.  No other medication change to be made at this time, patient was able to attend group today and is noted to be active and social with several peers.  Tentative discharge ongoing pending CRR placement.     Meli denies acute suicidal/self-harm ideation/intent/plan upon direct inquiry at this time.  Meli remains behaviorally appropriate, no agitation or aggression noted on exam or in report.  Impulse control is intact.  Meli remains adherent to her current psychotropic medication regimen and denies any side effects from medications, as well as none noted on  exam.    Psychiatric Review of Systems:  Behavior over the last 24 hours:  unchanged.   Sleep: good  Appetite: good  Medication side effects: none  ROS: no complaints, denies shortness of breath or chest pain and all other systems are negative for acute changes        Objective    Vitals:  Vitals:    09/25/24 0758   BP: 122/58   Pulse: 90   Resp: 19   Temp: 98.1 °F (36.7 °C)   SpO2: 96%       Laboratory Results:  I have personally reviewed all pertinent laboratory/tests results.  Most Recent Labs:   Lab Results   Component Value Date    WBC 10.27 (H) 09/23/2024    RBC 4.53 09/23/2024    HGB 13.7 09/23/2024    HCT 41.1 09/23/2024     09/23/2024    RDW 15.1 09/23/2024    NEUTROABS 5.89 09/23/2024    SODIUM 136 09/20/2024    K 3.9 09/20/2024     09/20/2024    CO2 26 09/20/2024    BUN 17 09/20/2024    CREATININE 0.86 09/20/2024    GLUC 198 (H) 09/20/2024    GLUF 98 08/10/2024    CALCIUM 9.6 09/20/2024    AST 21 09/20/2024    ALT 30 09/20/2024    ALKPHOS 96 09/20/2024    TP 7.2 09/20/2024    ALB 4.2 09/20/2024    TBILI 0.34 09/20/2024    AMMONIA 32 07/03/2024    GLY9YLRWWOVG 2.000 07/24/2024    HGBA1C 6.4 (H) 05/03/2024     05/03/2024       Mental Status Evaluation:  Appearance:  disheveled, marginal hygiene   Behavior:  bizarre, guarded, more redirectable   Speech:  less pressured   Mood:  less manic   Affect:  constricted   Thought Process:  disorganized, illogical, perseverative, concrete   Thought Content:  Restorationist and somatic preoccupation, paranoid ideation, negative thoughts   Perceptual Disturbances: appears preoccupied, talks to self at times   Risk Potential: Suicidal ideation - None  Homicidal ideation - None  Potential for aggression - No   Memory:  recent and remote memory grossly intact   Sensorium  person, place, and time/date      Consciousness:  alert and awake   Attention: attention span and concentration are age appropriate   Insight:  limited   Judgment: limited   Gait/Station:  normal gait/station   Motor Activity: no abnormal movements       Counseling / Coordination of Care:  Patient's progress reviewed with nursing staff.  Medications, treatment progress and treatment plan reviewed with patient.  Supportive counseling provided to the patient.    This note was completed in part utilizing Dragon dictation Software. Grammatical, translation, syntax errors, random word insertions, spelling mistakes, and incomplete sentences may be an occasional consequence of this system secondary to software limitations with voice recognition, ambient noise, and hardware issues. If you have any questions or concerns about the content, text, or information contained within the body of this dictation, please contact the provider for clarification

## 2024-09-25 NOTE — PLAN OF CARE
Problem: DISCHARGE PLANNING - CARE MANAGEMENT  Goal: Discharge to post-acute care or home with appropriate resources  Description: INTERVENTIONS:  - Conduct assessment to determine patient/family and health care team treatment goals, and need for post-acute services based on payer coverage, community resources, and patient preferences, and barriers to discharge  - Address psychosocial, clinical, and financial barriers to discharge as identified in assessment in conjunction with the patient/family and health care team  - Arrange appropriate level of post-acute services according to patient’s   needs and preference and payer coverage in collaboration with the physician and health care team  - Communicate with and update the patient/family, physician, and health care team regarding progress on the discharge plan  - Arrange appropriate transportation to post-acute venues  Outcome: Progressing     Problem: Alteration in Thoughts and Perception  Goal: Treatment Goal: Gain control of psychotic behaviors/thinking, reduce/eliminate presenting symptoms and demonstrate improved reality functioning upon discharge  Outcome: Progressing     Problem: Alteration in Thoughts and Perception  Goal: Verbalize thoughts and feelings  Description: Interventions:  - Promote a nonjudgmental and trusting relationship with the patient through active listening and therapeutic communication  - Assess patient's level of functioning, behavior and potential for risk  - Engage patient in 1 on 1 interactions  - Encourage patient to express fears, feelings, frustrations, and discuss symptoms    - [place] patient to reality, help patient recognize reality-based thinking   - Administer medications as ordered and assess for potential side effects  - Provide the patient education related to the signs and symptoms of the illness and desired effects of prescribed medications  Outcome: Progressing     Problem: Alteration in Thoughts and Perception  Goal:  Refrain from acting on delusional thinking/internal stimuli  Description: Interventions:  - Monitor patient closely, per order   - Utilize least restrictive measures   - Set reasonable limits, give positive feedback for acceptable   - Administer medications as ordered and monitor of potential side effects  Outcome: Progressing     Problem: Alteration in Thoughts and Perception  Goal: Agree to be compliant with medication regime, as prescribed and report medication side effects  Description: Interventions:  - Offer appropriate PRN medication and supervise ingestion; conduct AIMS, as needed   Outcome: Progressing     Problem: Alteration in Thoughts and Perception  Goal: Attend and participate in unit activities, including therapeutic, recreational, and educational groups  Description: Interventions:  -Encourage Visitation and family involvement in care  Outcome: Progressing     Problem: Alteration in Thoughts and Perception  Goal: Recognize dysfunctional thoughts, communicate reality-based thoughts at the time of discharge  Description: Interventions:  - Provide medication and psycho-education to assist patient in compliance and developing insight into his/her illness   Outcome: Progressing     Problem: Alteration in Thoughts and Perception  Goal: Complete daily ADLs, including personal hygiene independently, as able  Description: Interventions:  - Observe, teach, and assist patient with ADLS  - Monitor and promote a balance of rest/activity, with adequate nutrition and elimination   Outcome: Progressing

## 2024-09-25 NOTE — NURSING NOTE
"Patient is pleasant and cooperative , with poor concentration. Her  behaviors and attitude is suspicious asking nurse \"if she believes in devil and immediately saying please don't tell anyone I asked \", paranoid and warm and friendly. Patient has bizarre appearance with worried affect. Good medication compliance. Mouth checks done. Impulsive behaviors not  observed. Patients speech is less pressured and she is more calm today.  Denies Si,HI. Patient appears internally preoccupied.   "

## 2024-09-26 LAB
ALBUMIN SERPL BCG-MCNC: 4 G/DL (ref 3.5–5)
ALP SERPL-CCNC: 94 U/L (ref 34–104)
ALT SERPL W P-5'-P-CCNC: 22 U/L (ref 7–52)
ANION GAP SERPL CALCULATED.3IONS-SCNC: 8 MMOL/L (ref 4–13)
AST SERPL W P-5'-P-CCNC: 15 U/L (ref 13–39)
BILIRUB SERPL-MCNC: 0.27 MG/DL (ref 0.2–1)
BUN SERPL-MCNC: 22 MG/DL (ref 5–25)
CALCIUM SERPL-MCNC: 9.1 MG/DL (ref 8.4–10.2)
CHLORIDE SERPL-SCNC: 102 MMOL/L (ref 96–108)
CO2 SERPL-SCNC: 27 MMOL/L (ref 21–32)
CREAT SERPL-MCNC: 1.03 MG/DL (ref 0.6–1.3)
GFR SERPL CREATININE-BSD FRML MDRD: 60 ML/MIN/1.73SQ M
GLUCOSE SERPL-MCNC: 116 MG/DL (ref 65–140)
POTASSIUM SERPL-SCNC: 4.1 MMOL/L (ref 3.5–5.3)
PROT SERPL-MCNC: 6.7 G/DL (ref 6.4–8.4)
SODIUM SERPL-SCNC: 137 MMOL/L (ref 135–147)
VALPROATE SERPL-MCNC: 20 UG/ML (ref 50–100)

## 2024-09-26 PROCEDURE — 99232 SBSQ HOSP IP/OBS MODERATE 35: CPT | Performed by: STUDENT IN AN ORGANIZED HEALTH CARE EDUCATION/TRAINING PROGRAM

## 2024-09-26 PROCEDURE — 80053 COMPREHEN METABOLIC PANEL: CPT | Performed by: STUDENT IN AN ORGANIZED HEALTH CARE EDUCATION/TRAINING PROGRAM

## 2024-09-26 PROCEDURE — 80164 ASSAY DIPROPYLACETIC ACD TOT: CPT | Performed by: STUDENT IN AN ORGANIZED HEALTH CARE EDUCATION/TRAINING PROGRAM

## 2024-09-26 RX ADMIN — CARVEDILOL 6.25 MG: 6.25 TABLET, FILM COATED ORAL at 17:24

## 2024-09-26 RX ADMIN — FAMOTIDINE 20 MG: 20 TABLET ORAL at 17:24

## 2024-09-26 RX ADMIN — DIVALPROEX SODIUM 500 MG: 500 TABLET, EXTENDED RELEASE ORAL at 21:58

## 2024-09-26 RX ADMIN — LORAZEPAM 0.5 MG: 0.5 TABLET ORAL at 08:25

## 2024-09-26 RX ADMIN — NICOTINE 1 PATCH: 7 PATCH, EXTENDED RELEASE TRANSDERMAL at 08:27

## 2024-09-26 RX ADMIN — CYANOCOBALAMIN TAB 1000 MCG 1000 MCG: 1000 TAB at 08:25

## 2024-09-26 RX ADMIN — TRAZODONE HYDROCHLORIDE 50 MG: 50 TABLET ORAL at 21:58

## 2024-09-26 RX ADMIN — FLUTICASONE FUROATE 1 PUFF: 100 POWDER RESPIRATORY (INHALATION) at 08:27

## 2024-09-26 RX ADMIN — Medication 15 ML: at 08:24

## 2024-09-26 RX ADMIN — SENNOSIDES AND DOCUSATE SODIUM 2 TABLET: 8.6; 5 TABLET ORAL at 21:58

## 2024-09-26 RX ADMIN — POLYETHYLENE GLYCOL 3350 17 G: 17 POWDER, FOR SOLUTION ORAL at 08:27

## 2024-09-26 RX ADMIN — FLUOXETINE 60 MG: 20 SOLUTION ORAL at 08:25

## 2024-09-26 RX ADMIN — ATORVASTATIN CALCIUM 10 MG: 10 TABLET, FILM COATED ORAL at 08:25

## 2024-09-26 RX ADMIN — CARVEDILOL 6.25 MG: 6.25 TABLET, FILM COATED ORAL at 08:25

## 2024-09-26 RX ADMIN — CLOZAPINE 700 MG: 200 TABLET ORAL at 21:58

## 2024-09-26 RX ADMIN — FAMOTIDINE 20 MG: 20 TABLET ORAL at 08:25

## 2024-09-26 RX ADMIN — PRAZOSIN HYDROCHLORIDE 2 MG: 1 CAPSULE ORAL at 21:58

## 2024-09-26 RX ADMIN — LORAZEPAM 0.5 MG: 0.5 TABLET ORAL at 17:23

## 2024-09-26 RX ADMIN — MELATONIN TAB 3 MG 3 MG: 3 TAB at 21:58

## 2024-09-26 NOTE — NURSING NOTE
Pt overall calm and cooperative throughout shift. Presents guarded w/ low frustration tolerance. Pt able to self redirect when becoming irritable w/ staff. Remains behaviorally controlled. No need to utilize PRN medications. Minimal on assessment, but denies all psych S+S's. Appears internally preoccupied. Spends time coloring quietly in dayroom. Attends partial groups. Takes medications w/ little encouragement. Compliant w/ mouth checks. Q7's maintained. Will cont to provide support as needed.

## 2024-09-26 NOTE — PROGRESS NOTES
09/26/24 0820   Team Meeting   Meeting Type Daily Rounds   Initial Conference Date 09/26/24   Next Conference Date 09/27/24   Team Members Present   Team Members Present Physician;Nurse;;   Physician Team Member Dr Banuelos, Dr Alberto, Dr Garcia, JAIR Brock   Nursing Team Member Clarissa   Care Management Team Member Anum   Social Work Team Member Ayse   Patient/Family Present   Patient Present No   Patient's Family Present No     Taking meds, depakote level this evening, reports she is miserable, more calm, less restless, decreased prozac yesterday, discharge pending CRR.

## 2024-09-26 NOTE — PROGRESS NOTES
Pt attended all groups today.     09/26/24 1000   Activity/Group Checklist   Group Other (Comment)  (Community meeting: reframing and positive affirmation)   Attendance Attended   Attendance Duration (min) 31-45   Interactions Other (Comment)  (needed prompting)   Affect/Mood Constricted   Goals Achieved Identified triggers;Identified feelings;Identified relapse prevention strategies;Able to manage/cope with feelings;Able to reflect/comment on own behavior;Able to engage in interactions;Able to listen to others;Verbalized increased hopefulness;Able to self-disclose

## 2024-09-26 NOTE — PLAN OF CARE
Problem: Prexisting or High Potential for Compromised Skin Integrity  Goal: Skin integrity is maintained or improved  Description: INTERVENTIONS:  - Identify patients at risk for skin breakdown  - Assess and monitor skin integrity  - Assess and monitor nutrition and hydration status  - Monitor labs   - Assess for incontinence   - Turn and reposition patient  - Assist with mobility/ambulation  - Relieve pressure over bony prominences  - Avoid friction and shearing  - Provide appropriate hygiene as needed including keeping skin clean and dry  - Evaluate need for skin moisturizer/barrier cream  - Collaborate with interdisciplinary team   - Patient/family teaching  - Consider wound care consult   Outcome: Progressing     Problem: Alteration in Thoughts and Perception  Goal: Verbalize thoughts and feelings  Description: Interventions:  - Promote a nonjudgmental and trusting relationship with the patient through active listening and therapeutic communication  - Assess patient's level of functioning, behavior and potential for risk  - Engage patient in 1 on 1 interactions  - Encourage patient to express fears, feelings, frustrations, and discuss symptoms    - [place] patient to reality, help patient recognize reality-based thinking   - Administer medications as ordered and assess for potential side effects  - Provide the patient education related to the signs and symptoms of the illness and desired effects of prescribed medications  Outcome: Progressing  Goal: Agree to be compliant with medication regime, as prescribed and report medication side effects  Description: Interventions:  - Offer appropriate PRN medication and supervise ingestion; conduct AIMS, as needed   Outcome: Progressing  Goal: Attend and participate in unit activities, including therapeutic, recreational, and educational groups  Description: Interventions:  -Encourage Visitation and family involvement in care  Outcome: Progressing     Problem:  Ineffective Coping  Goal: Identifies ineffective coping skills  Outcome: Not Progressing  Goal: Participates in unit activities  Description: Interventions:  - Provide therapeutic environment   - Provide required programming   - Redirect inappropriate behaviors   Outcome: Progressing  Goal: Understands least restrictive measures  Description: Interventions:  - Utilize least restrictive behavior  Outcome: Progressing  Goal: Free from restraint events  Description: - Utilize least restrictive measures   - Provide behavioral interventions   - Redirect inappropriate behaviors   Outcome: Progressing     Problem: Risk for Self Injury/Neglect  Goal: Refrain from harming self  Description: Interventions:  - Monitor patient closely, per order  - Develop a trusting relationship  - Supervise medication ingestion, monitor effects and side effects   Outcome: Progressing     Problem: Depression  Goal: Treatment Goal: Demonstrate behavioral control of depressive symptoms, verbalize feelings of improved mood/affect, and adopt new coping skills prior to discharge  Outcome: Not Progressing  Goal: Refrain from isolation  Description: Interventions:  - Develop a trusting relationship   - Encourage socialization   Outcome: Not Progressing     Problem: Anxiety  Goal: Anxiety is at manageable level  Description: Interventions:  - Assess and monitor patient's anxiety level.   - Monitor for signs and symptoms (heart palpitations, chest pain, shortness of breath, headaches, nausea, feeling jumpy, restlessness, irritable, apprehensive).   - Collaborate with interdisciplinary team and initiate plan and interventions as ordered.  - [place] patient to unit/surroundings  - Explain treatment plan  - Encourage participation in care  - Encourage verbalization of concerns/fears  - Identify coping mechanisms  - Assist in developing anxiety-reducing skills  - Administer/offer alternative therapies  - Limit or eliminate stimulants  Outcome: Progressing      Problem: Potential for Falls  Goal: Patient will remain free of falls  Description: INTERVENTIONS:  - Educate patient on patient safety including physical limitations  - Instruct patient to call for assistance with activity   - Consult OT/PT to assist with strengthening/mobility   - Keep Call bell within reach  - Keep bed low and locked with side rails adjusted as appropriate  - Keep care items and personal belongings within reach  - Initiate and maintain comfort rounds  - Offer Toileting every 2 Hours, in advance of need  - Initiate/Maintain bed and chair alarm  - Obtain necessary fall risk management equipment: walker, wheelchair  - Apply yellow socks and bracelet for high fall risk patients  - Patient moved to room near nurses station  Outcome: Progressing     Problem: Nutrition/Hydration-ADULT  Goal: Nutrient/Hydration intake appropriate for improving, restoring or maintaining nutritional needs  Description: Monitor and assess patient's nutrition/hydration status for malnutrition. Collaborate with interdisciplinary team and initiate plan and interventions as ordered.  Monitor patient's weight and dietary intake as ordered or per policy. Utilize nutrition screening tool and intervene as necessary. Determine patient's food preferences and provide high-protein, high-caloric foods as appropriate.     INTERVENTIONS:  - Monitor oral intake, urinary output, labs, and treatment plans  - Assess nutrition and hydration status and recommend course of action  - Evaluate amount of meals eaten  - Assist patient with eating if necessary   - Allow adequate time for meals  - Recommend/ encourage appropriate diets, oral nutritional supplements, and vitamin/mineral supplements  - Order, calculate, and assess calorie counts as needed  - Recommend, monitor, and adjust tube feedings and TPN/PPN based on assessed needs  - Assess need for intravenous fluids  - Provide specific nutrition/hydration education as appropriate  - Include  patient/family/caregiver in decisions related to nutrition  Outcome: Progressing

## 2024-09-26 NOTE — PROGRESS NOTES
Progress Note - Behavioral Health   Name: Meli Nowak 59 y.o. female I MRN: [id number]  Unit/Bed#: [facility] 651-01 I Date of Admission: 7/23/2024   Date of Service: 9/26/2024 I Hospital Day: 65     Assessment & Plan  Schizoaffective disorder, bipolar type (HCC)  Progress towards goals  Awaiting CRR placement  Off titrate Prozac due to manic symptoms, Introduce Depakote 500 mg qhs for mood stabilization. VPA level to be obtained 9/26/2024  Continue medications as prescribed  Clozaril 650 mg nightly for schizoaffective disorder, Clozaril level 9/16 was 541, last ANC on 9/16 was 3.86    Ativan 0.5 mg twice daily for anxiety and paranoia  Melatonin 3 mg nightly for insomnia  Prazosin 2 mg nightly for PTSD associated nightmares   trazodone 50 mg nightly for insomnia    No associated orders from this encounter found during lookback period of 72 hours.        Plan   Progress Toward Goals:   Meli is progressing towards goals of inpatient psychiatric treatment by continued medication compliance and is attending therapeutic modalities on the milieu. However, the patient continues to require inpatient psychiatric hospitalization for continued medication management and titration to optimize symptom reduction, improve sleep hygiene, and demonstrate adequate self-care.    Recommended Treatment: Treatment plan and medication changes discussed and per the attending physician the plan is:    1.Continue with group therapy, milieu therapy and occupational therapy  2.Behavioral Health checks every 7 minutes  3.Continue frequent safety checks and vitals per unit protocol  4.Continue with SLIM medical management as indicated  5.Continue with current medication regimen  6.Will review labs in the a.m.  7.Disposition Planning: Discharge planning and efforts remain ongoing    Behavioral Health Medications: all current active meds have been reviewed and continue current psychiatric medications.  Current Facility-Administered Medications    Medication Dose Route Frequency Provider Last Rate    acetaminophen  650 mg Oral Q4H PRN Marie Ziegler, CRNP      acetaminophen  650 mg Oral Q4H PRN Marie Ziegler, CRNP      acetaminophen  975 mg Oral Q6H PRN Marie Ziegler, CRNP      aluminum-magnesium hydroxide-simethicone  30 mL Oral Q4H PRN Parris Bolanos, CRNP      Artificial Tears  1 drop Both Eyes Q6H PRN Dereje Banuelos MD      atorvastatin  10 mg Oral Daily Marie Ziegler, CRNP      bisacodyl  10 mg Rectal Daily PRN Marie Ziegler, CRNP      carvedilol  6.25 mg Oral BID With Meals Marie Ziegler, CRNP      cloZAPine  700 mg Oral HS Marie Ziegler, CRNP      cyanocobalamin  1,000 mcg Oral Daily Marie Ziegler, CRNP      divalproex sodium  500 mg Oral HS Dereje Banuelos MD      famotidine  20 mg Oral BID Shan Fitzgerald, DO      FLUoxetine  60 mg Oral Daily Marie Ziegler, CRNP      fluticasone  1 puff Inhalation Daily Marie Ziegler, CRNP      hydrOXYzine HCL  25 mg Oral Q6H PRN Max 4/day Marie Ziegler, CRNP      hydrOXYzine HCL  50 mg Oral Q6H PRN Max 4/day Marie Ziegler, CRNP      ipratropium-albuterol  3 mL Nebulization Q4H PRN Darin Lawton MD      LORazepam  0.5 mg Oral BID Marie Ziegler, CRNP      Or    LORazepam  0.5 mg Intramuscular BID Marie Ziegler, CRNP      LORazepam  1 mg Intramuscular Q6H PRN Max 3/day Marie Ziegler, CRNP      LORazepam  1 mg Oral Q6H PRN Max 3/day Marie Ziegler, CRNP      melatonin  3 mg Oral HS Marie Ziegler, CRNP      mirtazapine  7.5 mg Oral HS PRN Marie Ziegler, CRNP      Multivitamin  15 mL Oral Daily Marie Ziegler, CRNP      nicotine  1 patch Transdermal Daily Marie Ziegler, CRNP      OLANZapine  2.5 mg Oral Q6H PRN Dereje Banuelos MD      Or    OLANZapine  2.5 mg Intramuscular Q6H PRN Dereje Banuelos MD      OLANZapine  5 mg Oral Q6H PRN Dereje Banuelos MD      Or    OLANZapine  5 mg Intramuscular Q6H PRN Dereje Banuelos MD      ondansetron  4 mg Oral Q6H PRN Darin Lawton MD       polyethylene glycol  17 g Oral Daily PRN JOSE ELIAS Figueroa      polyethylene glycol  17 g Oral Daily Kristen Ludwig Doreen, JOSE ELIAS      prazosin  2 mg Oral HS JOSE ELIAS Figueroa      senna-docusate sodium  2 tablet Oral HS Rehana Borrego PA-C      traZODone  50 mg Oral HS JOSE ELIAS Figueroa         Risks / Benefits of Treatment:  Risks, benefits, and possible side effects of medications explained to patient and patient verbalizes understanding and agreement for treatment.       Subjective    Patient was seen today for continuation of care, records reviewed and patient was discussed with the morning case review team.    Meli was seen today for psychiatric follow-up.  On assessment today, Meli was more evasive and guarded.  Still very paranoid, patient also reports voices of her father overnight and continues to use the radio as a distraction.  Prozac being off titrated due to champ like symptoms, patient condition improving with reduction in pressured speech, restlessness and agitation.  Tolerating recent introduction of Depakote 500 mg nightly, VPA level to be obtained this evening with possible up titration.  Clozapine also continued, CBC, CRP and CH WNL, will continue to monitor for any changes.  Tentative discharge ongoing pending CRR placement.    Meli denies acute suicidal/self-harm ideation/intent/plan upon direct inquiry at this time.  Meli remains behaviorally appropriate, no agitation or aggression noted on exam or in report.  Meli also denies HI/AH/VH, and does not appear overtly manic.  No overt delusions or paranoia are verbalized. Impulse control is intact.  Meli remains adherent to her current psychotropic medication regimen and denies any side effects from medications, as well as none noted on exam.    Psychiatric Review of Systems:  Behavior over the last 24 hours:  unchanged.   Sleep: good  Appetite: good  Medication side effects: none  ROS: no complaints, denies shortness of  breath or chest pain and all other systems are negative for acute changes        Objective    Vitals:  Vitals:    09/26/24 0749   BP: 123/82   Pulse: 88   Resp: 18   Temp: 97.9 °F (36.6 °C)   SpO2: 94%       Laboratory Results:  I have personally reviewed all pertinent laboratory/tests results.  Most Recent Labs:   Lab Results   Component Value Date    WBC 10.27 (H) 09/23/2024    RBC 4.53 09/23/2024    HGB 13.7 09/23/2024    HCT 41.1 09/23/2024     09/23/2024    RDW 15.1 09/23/2024    NEUTROABS 5.89 09/23/2024    SODIUM 136 09/20/2024    K 3.9 09/20/2024     09/20/2024    CO2 26 09/20/2024    BUN 17 09/20/2024    CREATININE 0.86 09/20/2024    GLUC 198 (H) 09/20/2024    GLUF 98 08/10/2024    CALCIUM 9.6 09/20/2024    AST 21 09/20/2024    ALT 30 09/20/2024    ALKPHOS 96 09/20/2024    TP 7.2 09/20/2024    ALB 4.2 09/20/2024    TBILI 0.34 09/20/2024    AMMONIA 32 07/03/2024    [id number] 2.000 07/24/2024    HGBA1C 6.4 (H) 05/03/2024     05/03/2024       Mental Status Evaluation:  Appearance:  disheveled, marginal hygiene   Behavior:  bizarre, guarded, less agitated   Speech:  normal rate and volume   Mood:  anxious, less irritable, less manic   Affect:  constricted   Thought Process:  illogical, perseverative, concrete, less disorganized   Thought Content:  Jain and somatic preoccupation, paranoid ideation, negative thoughts, ruminating thoughts   Perceptual Disturbances: appears preoccupied, talks to self at times   Risk Potential: Suicidal ideation - None  Homicidal ideation - None  Potential for aggression - No   Memory:  recent and remote memory grossly intact   Sensorium  person, place, and time/date      Consciousness:  alert and awake   Attention: attention span and concentration are age appropriate   Insight:  limited   Judgment: limited   Gait/Station: normal gait/station   Motor Activity: no abnormal movements       Counseling / Coordination of Care:  Patient's progress reviewed with  nursing staff.  Medications, treatment progress and treatment plan reviewed with patient.  Supportive counseling provided to the patient.    This note was completed in part utilizing Dragon dictation Software. Grammatical, translation, syntax errors, random word insertions, spelling mistakes, and incomplete sentences may be an occasional consequence of this system secondary to software limitations with voice recognition, ambient noise, and hardware issues. If you have any questions or concerns about the content, text, or information contained within the body of this dictation, please contact the provider for clarification

## 2024-09-26 NOTE — NURSING NOTE
"Patient is delusional, paranoid, and anxious. She stated \" All you people hate me and I don't want anything to do with any of you, all of you treat me like a child. She was medication compliant and mouth checks were completed, safety measures maintained.  "

## 2024-09-27 PROCEDURE — 99232 SBSQ HOSP IP/OBS MODERATE 35: CPT | Performed by: STUDENT IN AN ORGANIZED HEALTH CARE EDUCATION/TRAINING PROGRAM

## 2024-09-27 RX ORDER — FLUOXETINE 20 MG/5ML
40 SOLUTION ORAL DAILY
Status: DISCONTINUED | OUTPATIENT
Start: 2024-09-28 | End: 2024-09-29

## 2024-09-27 RX ORDER — DIVALPROEX SODIUM 500 MG/1
1000 TABLET, FILM COATED, EXTENDED RELEASE ORAL
Status: DISCONTINUED | OUTPATIENT
Start: 2024-09-27 | End: 2024-10-09

## 2024-09-27 RX ADMIN — FAMOTIDINE 20 MG: 20 TABLET ORAL at 08:20

## 2024-09-27 RX ADMIN — FLUOXETINE 60 MG: 20 SOLUTION ORAL at 08:24

## 2024-09-27 RX ADMIN — CARVEDILOL 6.25 MG: 6.25 TABLET, FILM COATED ORAL at 17:13

## 2024-09-27 RX ADMIN — Medication 15 ML: at 08:20

## 2024-09-27 RX ADMIN — SENNOSIDES AND DOCUSATE SODIUM 2 TABLET: 8.6; 5 TABLET ORAL at 21:15

## 2024-09-27 RX ADMIN — LORAZEPAM 0.5 MG: 0.5 TABLET ORAL at 17:13

## 2024-09-27 RX ADMIN — FLUTICASONE FUROATE 1 PUFF: 100 POWDER RESPIRATORY (INHALATION) at 08:24

## 2024-09-27 RX ADMIN — ACETAMINOPHEN 975 MG: 325 TABLET, FILM COATED ORAL at 21:16

## 2024-09-27 RX ADMIN — FAMOTIDINE 20 MG: 20 TABLET ORAL at 17:13

## 2024-09-27 RX ADMIN — DIVALPROEX SODIUM 1000 MG: 500 TABLET, EXTENDED RELEASE ORAL at 21:14

## 2024-09-27 RX ADMIN — CYANOCOBALAMIN TAB 1000 MCG 1000 MCG: 1000 TAB at 08:20

## 2024-09-27 RX ADMIN — CLOZAPINE 700 MG: 200 TABLET ORAL at 21:14

## 2024-09-27 RX ADMIN — LORAZEPAM 0.5 MG: 0.5 TABLET ORAL at 08:20

## 2024-09-27 RX ADMIN — TRAZODONE HYDROCHLORIDE 50 MG: 50 TABLET ORAL at 21:15

## 2024-09-27 RX ADMIN — NICOTINE 1 PATCH: 7 PATCH, EXTENDED RELEASE TRANSDERMAL at 08:23

## 2024-09-27 RX ADMIN — CARVEDILOL 6.25 MG: 6.25 TABLET, FILM COATED ORAL at 08:20

## 2024-09-27 RX ADMIN — POLYETHYLENE GLYCOL 3350 17 G: 17 POWDER, FOR SOLUTION ORAL at 08:23

## 2024-09-27 RX ADMIN — ALUMINUM HYDROXIDE, MAGNESIUM HYDROXIDE, AND DIMETHICONE 30 ML: 200; 20; 200 SUSPENSION ORAL at 21:16

## 2024-09-27 RX ADMIN — ATORVASTATIN CALCIUM 10 MG: 10 TABLET, FILM COATED ORAL at 08:20

## 2024-09-27 RX ADMIN — MELATONIN TAB 3 MG 3 MG: 3 TAB at 21:15

## 2024-09-27 NOTE — PROGRESS NOTES
09/27/24 0812   Team Meeting   Meeting Type Daily Rounds   Initial Conference Date 09/27/24   Next Conference Date 09/30/24   Team Members Present   Team Members Present Physician;Nurse;;   Physician Team Member Dr Banuelos, JAIR Goldman   Nursing Team Member Jason   Care Management Team Member Anum   Social Work Team Member Ayse   Patient/Family Present   Patient Present No   Patient's Family Present No     Prozac decreased to 40 and will d/c this weekend, evasive yesterday, flashing peers in group, discharge pending CRR

## 2024-09-27 NOTE — PROGRESS NOTES
Progress Note - Behavioral Health   Name: Meli Nowak 59 y.o. female I MRN: [id number]  Unit/Bed#: [facility] 6[facility]-01 I Date of Admission: 7/23/2024   Date of Service: 9/27/2024 I Hospital Day: 66     Assessment & Plan  Schizoaffective disorder, bipolar type (HCC)  Progress towards goals  Awaiting CRR placement  Decrease Prozac to 40 mg daily, VPA level obtained on 9/26/2024 at 20; will increase Depakote to 1000 mg qhs for mood stabilization  Continue medications as prescribed  Clozaril 700 mg nightly for schizoaffective disorder, Clozaril level 9/16 was 541, last ANC on 9/16 was 3.86    Ativan 0.5 mg twice daily for anxiety and paranoia  Melatonin 3 mg nightly for insomnia  Prazosin 2 mg nightly for PTSD associated nightmares   trazodone 50 mg nightly for insomnia    SmartLink not supported outside of the [facility]counter [employer] SmartSection.        Plan   Progress Toward Goals:   Meli is progressing towards goals of inpatient psychiatric treatment by continued medication compliance and is attending therapeutic modalities on the milieu. However, the patient continues to require inpatient psychiatric hospitalization for continued medication management and titration to optimize symptom reduction, improve sleep hygiene, and demonstrate adequate self-care.    Recommended Treatment: Treatment plan and medication changes discussed and per the attending physician the plan is:    1.Continue with group therapy, milieu therapy and occupational therapy  2.Behavioral Health checks every 7 minutes  3.Continue frequent safety checks and vitals per unit protocol  4.Continue with SLIM medical management as indicated  5.Continue with current medication regimen  6.Will review labs in the a.m.  7.Disposition Planning: Discharge planning and efforts remain ongoing    Behavioral Health Medications: all current active meds have been reviewed and continue current psychiatric medications.  Current Facility-Administered Medications   Medication  Dose Route Frequency Provider Last Rate    acetaminophen  650 mg Oral Q4H PRN Marie Ziegler, CRNP      acetaminophen  650 mg Oral Q4H PRN Marie Ziegler, CRNP      acetaminophen  975 mg Oral Q6H PRN Marie Ziegler, CHRISTOPHERNP      aluminum-magnesium hydroxide-simethicone  30 mL Oral Q4H PRN Parris Bolanos, CRNP      Artificial Tears  1 drop Both Eyes Q6H PRN Dereje Banuelos MD      atorvastatin  10 mg Oral Daily Marie Ziegler, CRNP      bisacodyl  10 mg Rectal Daily PRN Marie Ziegler, CRNP      carvedilol  6.25 mg Oral BID With Meals Marie Ziegler, CRNP      cloZAPine  700 mg Oral HS Marie Ziegler, CRNP      cyanocobalamin  1,000 mcg Oral Daily Marie Ziegler, CRNP      divalproex sodium  1,000 mg Oral HS Marie Ziegler, CRNP      famotidine  20 mg Oral BID Shan Fitzgerald DO      [START ON 9/28/2024] FLUoxetine  40 mg Oral Daily Marie Ziegler, JOSE ELIAS      fluticasone  1 puff Inhalation Daily Marie Ziegler, JOSE ELIAS      hydrOXYzine HCL  25 mg Oral Q6H PRN Max 4/day Marie Ziegler, CRNP      hydrOXYzine HCL  50 mg Oral Q6H PRN Max 4/day Marie Ziegler, JOSE ELIAS      ipratropium-albuterol  3 mL Nebulization Q4H PRN Darin Lawton MD      LORazepam  0.5 mg Oral BID Marie Ziegler, CHRISTOPHERNP      Or    LORazepam  0.5 mg Intramuscular BID Marie Ziegler, CHRISTOPHERNP      LORazepam  1 mg Intramuscular Q6H PRN Max 3/day Marie Ziegler, JOSE ELIAS      LORazepam  1 mg Oral Q6H PRN Max 3/day Marie Ziegler, CHRISTOPHERNP      melatonin  3 mg Oral HS Marie Ziegler, CRNP      mirtazapine  7.5 mg Oral HS PRN Marie Ziegler, CRNP      Multivitamin  15 mL Oral Daily Marie Ziegler, CRNP      nicotine  1 patch Transdermal Daily Marie Ziegler, JOSE ELIAS      OLANZapine  2.5 mg Oral Q6H PRN Dereje Banuelos MD      Or    OLANZapine  2.5 mg Intramuscular Q6H PRN Dereje Banuelos MD      OLANZapine  5 mg Oral Q6H PRN Dereje Banuelos MD      Or    OLANZapine  5 mg Intramuscular Q6H PRN Dereje Banuelos MD      ondansetron  4 mg Oral Q6H PRN Darin S  MD Jered      polyethylene glycol  17 g Oral Daily PRN JOSE ELIAS Figueroa      polyethylene glycol  17 g Oral Daily JOSE ELIAS Ortega      prazosin  2 mg Oral HS JOSE ELIAS Figueroa      senna-docusate sodium  2 tablet Oral HS Rehana Borrego PA-C      traZODone  50 mg Oral HS JOSE ELIAS Figueroa         Risks / Benefits of Treatment:  Risks, benefits, and possible side effects of medications explained to patient and patient verbalizes understanding and agreement for treatment.       Subjective    Patient was seen today for continuation of care, records reviewed and patient was discussed with the morning case review team.    Meli was seen today for psychiatric follow-up.  On assessment today, Meli was still paranoid and preoccupied with wearing makeup today. Overall less manic, patient speech less pressured but remains tangential at times.   AH still endorsed, she does not report them as bothersome or command in nature. Prozac to be decreased to 40 mg daily due to manic symptoms, we will continue with off titration and discontinue medication on Sunday, 9/29/2024.  Depakote also to be increased to 1000 mg nightly secondary to VPA level obtained on 9/26/2024 at 20.  We will continue to monitor for any changes including weekly labs for Clozaril 700 mg nightly.  Tentative discharge ongoing as patient awaits CRR placement.    Meli denies acute suicidal/self-harm ideation/intent/plan upon direct inquiry at this time.  Meli remains behaviorally appropriate, no agitation or aggression noted on exam or in report.  Impulse control is intact.  Meli remains adherent to her current psychotropic medication regimen and denies any side effects from medications, as well as none noted on exam.    Psychiatric Review of Systems:  Behavior over the last 24 hours:  unchanged.   Sleep: good  Appetite: good  Medication side effects: none  ROS: no complaints, denies shortness of breath or chest pain and all other  systems are negative for acute changes        Objective    Vitals:  Vitals:    09/27/24 0723   BP: 147/75   Pulse: 94   Resp: 19   Temp: 97.8 °F (36.6 °C)   SpO2: 96%       Laboratory Results:  I have personally reviewed all pertinent laboratory/tests results.    Mental Status Evaluation:  Appearance:  disheveled, marginal hygiene   Behavior:  bizarre, demanding, guarded   Speech:  normal rate and volume, less tangential, less pressured   Mood:  anxious, less irritable, less manic   Affect:  constricted   Thought Process:  illogical, perseverative, concrete, less tangential   Thought Content:  Caodaism and somatic preoccupation, paranoid ideation, negative thoughts, ruminating thoughts   Perceptual Disturbances: auditory hallucinations still present, but less frequent, appears preoccupied, talks to self at times   Risk Potential: Suicidal ideation - None  Homicidal ideation - None  Potential for aggression - No   Memory:  recent and remote memory grossly intact   Sensorium  person, place, and time/date      Consciousness:  alert and awake   Attention: attention span and concentration are age appropriate   Insight:  limited   Judgment: limited   Gait/Station: normal gait/station   Motor Activity: no abnormal movements       Counseling / Coordination of Care:  Patient's progress reviewed with nursing staff.  Medications, treatment progress and treatment plan reviewed with patient.  Supportive counseling provided to the patient.    This note was completed in part utilizing Dragon dictation Software. Grammatical, translation, syntax errors, random word insertions, spelling mistakes, and incomplete sentences may be an occasional consequence of this system secondary to software limitations with voice recognition, ambient noise, and hardware issues. If you have any questions or concerns about the content, text, or information contained within the body of this dictation, please contact the provider for clarification

## 2024-09-27 NOTE — NURSING NOTE
Patient visible on the unit. Patient cooperative with encouragement and labile at times. Patient needing extra encouragement to shower after urinary incontinence episode. Patient is med compliant. VSS. Patient appetite intact with 100% breakfast eaten. VSS. Continual safety checks ongoing

## 2024-09-27 NOTE — ASSESSMENT & PLAN NOTE
Progress towards goals  Awaiting CRR placement  Down-titrated Prozac to 40 mg daily, VPA level obtained on 9/26/2024 at 20; increased Depakote to 1000 mg qhs for mood stabilization  Continue medications as prescribed  Clozaril 700 mg nightly for schizoaffective disorder, Clozaril level 9/16 was 541, last ANC on 9/16 was 3.86    Ativan 0.5 mg twice daily for anxiety and paranoia  Melatonin 3 mg nightly for insomnia  Prazosin 2 mg nightly for PTSD associated nightmares   trazodone 50 mg nightly for insomnia    No associated orders from this encounter found during lookback period of 72 hours.

## 2024-09-27 NOTE — PROGRESS NOTES
09/27/24 1100   Activity/Group Checklist   Group Life Skills  (password game on signs/symptoms of mental illness)   Attendance Attended   Attendance Duration (min) 46-60   Interactions Interacted appropriately  (P.t more spontaneously focused and agree with peers who complimented her on her progress with attending groups and interacting better with others.)   Affect/Mood Bright   Goals Achieved Able to engage in interactions;Able to listen to others;Able to reflect/comment on own behavior

## 2024-09-27 NOTE — PLAN OF CARE
Problem: Alteration in Thoughts and Perception  Goal: Verbalize thoughts and feelings  Description: Interventions:  - Promote a nonjudgmental and trusting relationship with the patient through active listening and therapeutic communication  - Assess patient's level of functioning, behavior and potential for risk  - Engage patient in 1 on 1 interactions  - Encourage patient to express fears, feelings, frustrations, and discuss symptoms    - Warren patient to reality, help patient recognize reality-based thinking   - Administer medications as ordered and assess for potential side effects  - Provide the patient education related to the signs and symptoms of the illness and desired effects of prescribed medications  Outcome: Progressing     Problem: Alteration in Thoughts and Perception  Goal: Agree to be compliant with medication regime, as prescribed and report medication side effects  Description: Interventions:  - Offer appropriate PRN medication and supervise ingestion; conduct AIMS, as needed   Outcome: Progressing     Problem: Prexisting or High Potential for Compromised Skin Integrity  Goal: Skin integrity is maintained or improved  Description: INTERVENTIONS:  - Identify patients at risk for skin breakdown  - Assess and monitor skin integrity  - Assess and monitor nutrition and hydration status  - Monitor labs   - Assess for incontinence   - Turn and reposition patient  - Assist with mobility/ambulation  - Relieve pressure over bony prominences  - Avoid friction and shearing  - Provide appropriate hygiene as needed including keeping skin clean and dry  - Evaluate need for skin moisturizer/barrier cream  - Collaborate with interdisciplinary team   - Patient/family teaching  - Consider wound care consult   Outcome: Progressing     Problem: Ineffective Coping  Goal: Participates in unit activities  Description: Interventions:  - Provide therapeutic environment   - Provide required programming   - Redirect  inappropriate behaviors   Outcome: Progressing     Problem: Ineffective Coping  Goal: Understands least restrictive measures  Description: Interventions:  - Utilize least restrictive behavior  Outcome: Progressing     Problem: Ineffective Coping  Goal: Free from restraint events  Description: - Utilize least restrictive measures   - Provide behavioral interventions   - Redirect inappropriate behaviors   Outcome: Progressing     Problem: Risk for Self Injury/Neglect  Goal: Treatment Goal: Remain safe during length of stay, learn and adopt new coping skills, and be free of self-injurious ideation, impulses and acts at the time of discharge  Outcome: Progressing

## 2024-09-27 NOTE — PROGRESS NOTES
Pt attended ALL groups.  Pt late to group in am.  Dressed in hospital and and wet hair.  Pt needed prompting and focused on coloring. Pt did acknowledge her current progress and shared with another peer her experience with ACT team.  Pt in afternoon able to stay for duration.  Disheveled hair and wearing hospital gown all day.       09/27/24 1000   Activity/Group Checklist   Group Other (Comment)  (Community meeting: What is [facility]?)   Attendance Attended;Other (Comment)  (late)   Attendance Duration (min) 31-45   Interactions Other (Comment)  (superficial)   Affect/Mood Constricted   Goals Achieved Identified feelings;Identified triggers;Able to listen to others;Able to engage in interactions;Verbalized increased hopefulness;Able to manage/cope with feelings;Able to reflect/comment on own behavior;Able to self-disclose;Discussed self-esteem issues;Discussed coping strategies

## 2024-09-27 NOTE — CASE MANAGEMENT
Cm met with pt to complete sections of CRR referral form. Pt pleasant and cooperative although unable to focus on questions, unable to provide answers to questions. Pt reports not knowing marital status. Pt then stated being  spiritually but unable to state if marriage was completed due to not being able to watch remainder of tv show. Pt also unable to answer alcohol or drug questions. Pt tangential and remains with psychotic symptoms.

## 2024-09-28 PROCEDURE — 99232 SBSQ HOSP IP/OBS MODERATE 35: CPT | Performed by: STUDENT IN AN ORGANIZED HEALTH CARE EDUCATION/TRAINING PROGRAM

## 2024-09-28 RX ADMIN — CARVEDILOL 6.25 MG: 6.25 TABLET, FILM COATED ORAL at 08:15

## 2024-09-28 RX ADMIN — DIVALPROEX SODIUM 1000 MG: 500 TABLET, EXTENDED RELEASE ORAL at 21:16

## 2024-09-28 RX ADMIN — POLYETHYLENE GLYCOL 3350 17 G: 17 POWDER, FOR SOLUTION ORAL at 08:24

## 2024-09-28 RX ADMIN — FAMOTIDINE 20 MG: 20 TABLET ORAL at 17:21

## 2024-09-28 RX ADMIN — TRAZODONE HYDROCHLORIDE 50 MG: 50 TABLET ORAL at 21:16

## 2024-09-28 RX ADMIN — FLUOXETINE 40 MG: 20 SOLUTION ORAL at 08:16

## 2024-09-28 RX ADMIN — MELATONIN TAB 3 MG 3 MG: 3 TAB at 21:16

## 2024-09-28 RX ADMIN — CYANOCOBALAMIN TAB 1000 MCG 1000 MCG: 1000 TAB at 08:15

## 2024-09-28 RX ADMIN — FAMOTIDINE 20 MG: 20 TABLET ORAL at 08:21

## 2024-09-28 RX ADMIN — SENNOSIDES AND DOCUSATE SODIUM 2 TABLET: 8.6; 5 TABLET ORAL at 21:16

## 2024-09-28 RX ADMIN — ATORVASTATIN CALCIUM 10 MG: 10 TABLET, FILM COATED ORAL at 08:14

## 2024-09-28 RX ADMIN — LORAZEPAM 0.5 MG: 0.5 TABLET ORAL at 08:15

## 2024-09-28 RX ADMIN — FLUTICASONE FUROATE 1 PUFF: 100 POWDER RESPIRATORY (INHALATION) at 08:17

## 2024-09-28 RX ADMIN — NICOTINE 1 PATCH: 7 PATCH, EXTENDED RELEASE TRANSDERMAL at 08:23

## 2024-09-28 RX ADMIN — PRAZOSIN HYDROCHLORIDE 2 MG: 1 CAPSULE ORAL at 21:16

## 2024-09-28 RX ADMIN — Medication 15 ML: at 08:15

## 2024-09-28 RX ADMIN — LORAZEPAM 0.5 MG: 0.5 TABLET ORAL at 17:21

## 2024-09-28 RX ADMIN — CARVEDILOL 6.25 MG: 6.25 TABLET, FILM COATED ORAL at 17:21

## 2024-09-28 RX ADMIN — CLOZAPINE 700 MG: 200 TABLET ORAL at 21:16

## 2024-09-28 NOTE — NURSING NOTE
Pt calm and cooperative. Noted to be in dining room laughing and responding to internal stimuli at times. Preoccupied with wanting water, radio, and cough drops. Medication and meal compliant. Denies SI/HI. Q 7 minute checks maintained.

## 2024-09-28 NOTE — NURSING NOTE
"Pt visible on the unit throughout shift  Minimal interaction w/ peers. Spends time coloring, peering out observation window, and listening to music. Attends groups. +meals and meds. Good appetite. Compliant w/ mouth checks. On assessment denies depression and anxiety. States, \"They are gone.\" When asked about psychosis Pt just laughs and smiles, but is unwilling to answer.   hs prazosin held due to parameters. Pt requests to see a dentist. Complaints of severe gum pain after eating potato chips. Also has complaints of heartburn and requests Maalox. Tylenol 975 administered for 10/10 pain. Will monitor for effectiveness and cont to offer support as needed.   "

## 2024-09-28 NOTE — PLAN OF CARE
Problem: DISCHARGE PLANNING - CARE MANAGEMENT  Goal: Discharge to post-acute care or home with appropriate resources  Description: INTERVENTIONS:  - Conduct assessment to determine patient/family and health care team treatment goals, and need for post-acute services based on payer coverage, community resources, and patient preferences, and barriers to discharge  - Address psychosocial, clinical, and financial barriers to discharge as identified in assessment in conjunction with the patient/family and health care team  - Arrange appropriate level of post-acute services according to patient’s   needs and preference and payer coverage in collaboration with the physician and health care team  - Communicate with and update the patient/family, physician, and health care team regarding progress on the discharge plan  - Arrange appropriate transportation to post-acute venues  Outcome: Progressing     Problem: Prexisting or High Potential for Compromised Skin Integrity  Goal: Skin integrity is maintained or improved  Description: INTERVENTIONS:  - Identify patients at risk for skin breakdown  - Assess and monitor skin integrity  - Assess and monitor nutrition and hydration status  - Monitor labs   - Assess for incontinence   - Turn and reposition patient  - Assist with mobility/ambulation  - Relieve pressure over bony prominences  - Avoid friction and shearing  - Provide appropriate hygiene as needed including keeping skin clean and dry  - Evaluate need for skin moisturizer/barrier cream  - Collaborate with interdisciplinary team   - Patient/family teaching  - Consider wound care consult   Outcome: Progressing     Problem: Alteration in Thoughts and Perception  Goal: Treatment Goal: Gain control of psychotic behaviors/thinking, reduce/eliminate presenting symptoms and demonstrate improved reality functioning upon discharge  Outcome: Progressing  Goal: Verbalize thoughts and feelings  Description: Interventions:  - Promote  a nonjudgmental and trusting relationship with the patient through active listening and therapeutic communication  - Assess patient's level of functioning, behavior and potential for risk  - Engage patient in 1 on 1 interactions  - Encourage patient to express fears, feelings, frustrations, and discuss symptoms    - [place] patient to reality, help patient recognize reality-based thinking   - Administer medications as ordered and assess for potential side effects  - Provide the patient education related to the signs and symptoms of the illness and desired effects of prescribed medications  Outcome: Progressing  Goal: Refrain from acting on delusional thinking/internal stimuli  Description: Interventions:  - Monitor patient closely, per order   - Utilize least restrictive measures   - Set reasonable limits, give positive feedback for acceptable   - Administer medications as ordered and monitor of potential side effects  Outcome: Progressing  Goal: Agree to be compliant with medication regime, as prescribed and report medication side effects  Description: Interventions:  - Offer appropriate PRN medication and supervise ingestion; conduct AIMS, as needed   Outcome: Progressing  Goal: Recognize dysfunctional thoughts, communicate reality-based thoughts at the time of discharge  Description: Interventions:  - Provide medication and psycho-education to assist patient in compliance and developing insight into his/her illness   Outcome: Progressing  Goal: Complete daily ADLs, including personal hygiene independently, as able  Description: Interventions:  - Observe, teach, and assist patient with ADLS  - Monitor and promote a balance of rest/activity, with adequate nutrition and elimination   Outcome: Progressing     Problem: Ineffective Coping  Goal: Identifies ineffective coping skills  Outcome: Progressing  Goal: Demonstrates healthy coping skills  Outcome: Progressing  Goal: Patient/Family participate in treatment and DC  plans  Description: Interventions:  - Provide therapeutic environment  Outcome: Progressing  Goal: Patient/Family verbalizes awareness of resources  Outcome: Progressing  Goal: Understands least restrictive measures  Description: Interventions:  - Utilize least restrictive behavior  Outcome: Progressing  Goal: Free from restraint events  Description: - Utilize least restrictive measures   - Provide behavioral interventions   - Redirect inappropriate behaviors   Outcome: Progressing     Problem: Risk for Self Injury/Neglect  Goal: Treatment Goal: Remain safe during length of stay, learn and adopt new coping skills, and be free of self-injurious ideation, impulses and acts at the time of discharge  Outcome: Progressing  Goal: Verbalize thoughts and feelings  Description: Interventions:  - Assess and re-assess patient's lethality and potential for self-injury  - Engage patient in 1:1 interactions, daily, for a minimum of 15 minutes  - Encourage patient to express feelings, fears, frustrations, hopes  - Establish rapport/trust with patient   Outcome: Progressing  Goal: Refrain from harming self  Description: Interventions:  - Monitor patient closely, per order  - Develop a trusting relationship  - Supervise medication ingestion, monitor effects and side effects   Outcome: Progressing  Goal: Recognize maladaptive responses and adopt new coping mechanisms  Outcome: Progressing     Problem: Depression  Goal: Treatment Goal: Demonstrate behavioral control of depressive symptoms, verbalize feelings of improved mood/affect, and adopt new coping skills prior to discharge  Outcome: Progressing  Goal: Verbalize thoughts and feelings  Description: Interventions:  - Assess and re-assess patient's level of risk   - Engage patient in 1:1 interactions, daily, for a minimum of 15 minutes   - Encourage patient to express feelings, fears, frustrations, hopes   Outcome: Progressing  Goal: Refrain from isolation  Description:  Interventions:  - Develop a trusting relationship   - Encourage socialization   Outcome: Progressing  Goal: Refrain from self-neglect  Outcome: Progressing     Problem: Anxiety  Goal: Anxiety is at manageable level  Description: Interventions:  - Assess and monitor patient's anxiety level.   - Monitor for signs and symptoms (heart palpitations, chest pain, shortness of breath, headaches, nausea, feeling jumpy, restlessness, irritable, apprehensive).   - Collaborate with interdisciplinary team and initiate plan and interventions as ordered.  - [place] patient to unit/surroundings  - Explain treatment plan  - Encourage participation in care  - Encourage verbalization of concerns/fears  - Identify coping mechanisms  - Assist in developing anxiety-reducing skills  - Administer/offer alternative therapies  - Limit or eliminate stimulants  Outcome: Progressing     Problem: SAFETY,RESTRAINT: NV/NON-SELF DESTRUCTIVE BEHAVIOR  Goal: Remains free of harm/injury (restraint for non violent/non self-detsructive behavior)  Description: INTERVENTIONS:  - Instruct patient/family regarding restraint use   - Assess and monitor physiologic and psychological status   - Provide interventions and comfort measures to meet assessed patient needs   - Identify and implement measures to help patient regain control  - Assess readiness for release of restraint   Outcome: Progressing  Goal: Returns to optimal restraint-free functioning  Description: INTERVENTIONS:  - Assess the patient's behavior and symptoms that indicate continued need for restraint  - Identify and implement measures to help patient regain control  - Assess readiness for release of restraint   Outcome: Progressing     Problem: Potential for Falls  Goal: Patient will remain free of falls  Description: INTERVENTIONS:  - Educate patient on patient safety including physical limitations  - Instruct patient to call for assistance with activity   - Consult OT/PT to assist with  strengthening/mobility   - Keep Call bell within reach  - Keep bed low and locked with side rails adjusted as appropriate  - Keep care items and personal belongings within reach  - Initiate and maintain comfort rounds  - Offer Toileting every 2 Hours, in advance of need  - Initiate/Maintain bed and chair alarm  - Obtain necessary fall risk management equipment: walker, wheelchair  - Apply yellow socks and bracelet for high fall risk patients  - Patient moved to room near nurses station  Outcome: Progressing     Problem: Nutrition/Hydration-ADULT  Goal: Nutrient/Hydration intake appropriate for improving, restoring or maintaining nutritional needs  Description: Monitor and assess patient's nutrition/hydration status for malnutrition. Collaborate with interdisciplinary team and initiate plan and interventions as ordered.  Monitor patient's weight and dietary intake as ordered or per policy. Utilize nutrition screening tool and intervene as necessary. Determine patient's food preferences and provide high-protein, high-caloric foods as appropriate.     INTERVENTIONS:  - Monitor oral intake, urinary output, labs, and treatment plans  - Assess nutrition and hydration status and recommend course of action  - Evaluate amount of meals eaten  - Assist patient with eating if necessary   - Allow adequate time for meals  - Recommend/ encourage appropriate diets, oral nutritional supplements, and vitamin/mineral supplements  - Order, calculate, and assess calorie counts as needed  - Recommend, monitor, and adjust tube feedings and TPN/PPN based on assessed needs  - Assess need for intravenous fluids  - Provide specific nutrition/hydration education as appropriate  - Include patient/family/caregiver in decisions related to nutrition  Outcome: Progressing

## 2024-09-28 NOTE — NURSING NOTE
Patient incontinent of urine this morning.  She was provided change of clothing and bedding and will shower.

## 2024-09-28 NOTE — PROGRESS NOTES
Progress Note - Behavioral Health   Name: Meli Nowak 59 y.o. female I MRN: [id number]  Unit/Bed#: [facility] 651-01 I Date of Admission: 7/23/2024   Date of Service: 9/28/2024 I Hospital Day: 67     Assessment & Plan  Schizoaffective disorder, bipolar type (HCC)  Progress towards goals  Awaiting CRR placement  Down-titrated Prozac to 40 mg daily, VPA level obtained on 9/26/2024 at 20; increased Depakote to 1000 mg qhs for mood stabilization  Continue medications as prescribed  Clozaril 700 mg nightly for schizoaffective disorder, Clozaril level 9/16 was 541, last ANC on 9/16 was 3.86    Ativan 0.5 mg twice daily for anxiety and paranoia  Melatonin 3 mg nightly for insomnia  Prazosin 2 mg nightly for PTSD associated nightmares   trazodone 50 mg nightly for insomnia    No associated orders from this encounter found during lookback period of 72 hours.        Plan   Progress Toward Goals: gradual improvement    Recommended Treatment:    - Encourage early mobility and having a structured day  - Provide frequent re-orientation, and cognitive stimulation  - Ensure assistive devices are in proper working order (eye-glasses, hearing aids)  - Encourage adequate hydration, nutrition and monitor bowel movements  - Maintain sleep-wake cycle: Uninterrupted sleep time; low-level lighting at night  - Fall precaution  - f/u SLIM recs regarding the medical problems   - f/u labs  - Check trough VPA level on 9/30/24  - Continue medication titration and treatment plan; adjust medication to optimize treatment response and as clinically indicated. .     Current medications:  Current Facility-Administered Medications   Medication Dose Route Frequency Provider Last Rate    acetaminophen  650 mg Oral Q4H PRN JOSE ELIAS Figueroa      acetaminophen  650 mg Oral Q4H PRN JOSE ELIAS Figueroa      acetaminophen  975 mg Oral Q6H PRN JOSE ELIAS Figueroa      aluminum-magnesium hydroxide-simethicone  30 mL Oral Q4H PRN JOSE ELIAS Puri       Artificial Tears  1 drop Both Eyes Q6H PRN Dereje Banuelos MD      atorvastatin  10 mg Oral Daily Marie Ziegler, CRNP      bisacodyl  10 mg Rectal Daily PRN Marie Ziegler, CRNP      carvedilol  6.25 mg Oral BID With Meals Marie Ziegler, CRNP      cloZAPine  700 mg Oral HS Marie Ziegler, CRNP      cyanocobalamin  1,000 mcg Oral Daily Marie Ziegler, CRNP      divalproex sodium  1,000 mg Oral HS Marie Ziegler, CRNP      famotidine  20 mg Oral BID Shan Fitzgerald, DO      FLUoxetine  40 mg Oral Daily Marie Ziegler, CRNP      fluticasone  1 puff Inhalation Daily Marie Ziegler, CRNP      hydrOXYzine HCL  25 mg Oral Q6H PRN Max 4/day Marie Ziegler, CRNP      hydrOXYzine HCL  50 mg Oral Q6H PRN Max 4/day Marie Ziegler, CRNP      ipratropium-albuterol  3 mL Nebulization Q4H PRN Darin Lawton MD      LORazepam  0.5 mg Oral BID Marie Ziegler, CRNP      Or    LORazepam  0.5 mg Intramuscular BID Marie Ziegler, CRNP      LORazepam  1 mg Intramuscular Q6H PRN Max 3/day Marie Ziegler, CRNP      LORazepam  1 mg Oral Q6H PRN Max 3/day Marie Ziegler, CRNP      melatonin  3 mg Oral HS Marie Ziegler, CRNP      mirtazapine  7.5 mg Oral HS PRN Marie Ziegler, CRNP      Multivitamin  15 mL Oral Daily Marie Ziegler, CRNP      nicotine  1 patch Transdermal Daily Marie Ziegler, CRNP      OLANZapine  2.5 mg Oral Q6H PRN Dereje Banuelos MD      Or    OLANZapine  2.5 mg Intramuscular Q6H PRN Dereje Banuelos MD      OLANZapine  5 mg Oral Q6H PRN Dereje Banuelos MD      Or    OLANZapine  5 mg Intramuscular Q6H PRN Dereje Banuelos MD      ondansetron  4 mg Oral Q6H PRN Darin Lawton MD      polyethylene glycol  17 g Oral Daily PRN Marie Ziegler, CRNP      polyethylene glycol  17 g Oral Daily Kristen Logan, JOSE ELIAS      prazosin  2 mg Oral HS Marie Ziegler, CRNP      senna-docusate sodium  2 tablet Oral HS Rehana Borrego, YENNY      traZODone  50 mg Oral HS Marie Ziegler, CRNP          -  "Observation: routine            - VS: as per unit protocol  - Diet: Regular diet  - Encourage group attendance and milieu therapy  - Dispo: To be determined      Subjective     Patient was visited on unit for continuing care; chart reviewed and discussed with the nursing staff.  On approach, the patient was calm and superficially cooperative, and appeared less perseverative and more redirectable. Endorsed better mood and less anxiety sxs, but continues to have negative thinking and ruminating thoughts. Remains preoccupied with somatic sxs and medications. Denied any changes in appetite, and energy level. No problem initiating and maintaining sleep.  Denied A/VH since yesterday. Continues to report intrusive thoughts and negative ruminations. Denied SI/HI, intent or plan upon direct inquiry at this time.    Patient continues to be visible in the milieu and interacts with staff and peers. No reports of aggression or self-injurious behavior on unit. No PRN medications used in the past 24 hours.    Patient accepted all offered medications and no adverse effects of medications noted or reported. Clozapine level was 647 on 9/23/24. Given the concerns for manic sxs (potentially induced by uptitration of Prozac), Prozac is being down titrated and Depakote 500 mg increased to 1000 mg nightly on 9/27; VPA level to be checked on 9/30 and doses to be adjusted as indicated.     Objective    Current Mental Status Evaluation:  Appearance and attitude: appeared as stated age, dressed in hospital attire, with fair hygiene  Eye contact: fair  Motor Function: within normal limits, intact gait, No PMA/PMR  Gait/station: normal gait/station and normal balance  Speech: less pressured speech  Language: No overt abnormality  Mood/affect: \"better\" / Affect was constricted but reactive, mood congruent  Thought Processes: concrete, ruminating  Thought content: denied suicidal ideations or homicidal ideations, somatic preoccupation, some " paranoia, negative thinking, intrusive thoughts, ruminations  Associations: concrete associations  Perceptual disturbances: denies Auditory/Visual/Tactile Hallucinations  Orientation: oriented to time, person, place and to the situational context  Cognitive Function: intact  Memory: not cooperative with formal MMSE  Fund of knowledge: aware of current events, aware of past history, and vocabulary average  Impulse control: good  Insight/judgment: limited/limited          Vital signs in last 24 hours:    Temp:  [97.5 °F (36.4 °C)-98 °F (36.7 °C)] 97.5 °F (36.4 °C)  HR:  [79-93] 93  Resp:  [17-18] 18  BP: (108-140)/(50-76) 140/65      Lab results: I have personally reviewed all pertinent laboratory/tests results      Counseling / Coordination of Care  Patient's progress discussed with staff in treatment team meeting.  Medications, treatment progress and treatment plan reviewed with patient.  Medication education provided to patient.  Supportive therapy provided to patient.  Cognitive techniques utilized during the session.  Reassurance and supportive therapy provided.  Reoriented to reality and reassured.  Encouraged participation in milieu and group therapy on the unit.  Crisis/safety plan discussed with patient.       Dereje Banuelos MD  Attending Psychiatrist   [facility]       This note was completed in part utilizing Dragon dictation Software. Grammatical, translation, syntax errors, random word insertions, spelling mistakes, and incomplete sentences may be an occasional consequence of this system secondary to software limitations with voice recognition, ambient noise, and hardware issues. If you have any questions or concerns about the content, text, or information contained within the body of this dictation, please contact the provider for clarification.

## 2024-09-29 PROCEDURE — 99232 SBSQ HOSP IP/OBS MODERATE 35: CPT | Performed by: STUDENT IN AN ORGANIZED HEALTH CARE EDUCATION/TRAINING PROGRAM

## 2024-09-29 RX ORDER — FLUOXETINE 20 MG/5ML
20 SOLUTION ORAL DAILY
Status: COMPLETED | OUTPATIENT
Start: 2024-09-30 | End: 2024-10-01

## 2024-09-29 RX ADMIN — NICOTINE 1 PATCH: 7 PATCH, EXTENDED RELEASE TRANSDERMAL at 08:41

## 2024-09-29 RX ADMIN — FLUOXETINE 40 MG: 20 SOLUTION ORAL at 08:36

## 2024-09-29 RX ADMIN — MIRTAZAPINE 7.5 MG: 7.5 TABLET, FILM COATED ORAL at 00:23

## 2024-09-29 RX ADMIN — FAMOTIDINE 20 MG: 20 TABLET ORAL at 08:41

## 2024-09-29 RX ADMIN — TRAZODONE HYDROCHLORIDE 50 MG: 50 TABLET ORAL at 21:48

## 2024-09-29 RX ADMIN — CARVEDILOL 6.25 MG: 6.25 TABLET, FILM COATED ORAL at 17:23

## 2024-09-29 RX ADMIN — CYANOCOBALAMIN TAB 1000 MCG 1000 MCG: 1000 TAB at 08:41

## 2024-09-29 RX ADMIN — DIVALPROEX SODIUM 1000 MG: 500 TABLET, EXTENDED RELEASE ORAL at 21:48

## 2024-09-29 RX ADMIN — PRAZOSIN HYDROCHLORIDE 2 MG: 1 CAPSULE ORAL at 21:48

## 2024-09-29 RX ADMIN — LORAZEPAM 0.5 MG: 0.5 TABLET ORAL at 08:36

## 2024-09-29 RX ADMIN — Medication 15 ML: at 08:36

## 2024-09-29 RX ADMIN — POLYETHYLENE GLYCOL 3350 17 G: 17 POWDER, FOR SOLUTION ORAL at 08:36

## 2024-09-29 RX ADMIN — ATORVASTATIN CALCIUM 10 MG: 10 TABLET, FILM COATED ORAL at 08:36

## 2024-09-29 RX ADMIN — FLUTICASONE FUROATE 1 PUFF: 100 POWDER RESPIRATORY (INHALATION) at 08:35

## 2024-09-29 RX ADMIN — FAMOTIDINE 20 MG: 20 TABLET ORAL at 17:23

## 2024-09-29 RX ADMIN — SENNOSIDES AND DOCUSATE SODIUM 2 TABLET: 8.6; 5 TABLET ORAL at 21:48

## 2024-09-29 RX ADMIN — LORAZEPAM 0.5 MG: 0.5 TABLET ORAL at 17:23

## 2024-09-29 RX ADMIN — MELATONIN TAB 3 MG 3 MG: 3 TAB at 21:48

## 2024-09-29 RX ADMIN — CARVEDILOL 6.25 MG: 6.25 TABLET, FILM COATED ORAL at 08:36

## 2024-09-29 RX ADMIN — CLOZAPINE 700 MG: 200 TABLET ORAL at 21:48

## 2024-09-29 NOTE — NURSING NOTE
Patient had interrupted sleep after 0023 administration of Remeron for insomnia.  She slept for about 3.5 hours and then could be heard laughing and talking in her room to no one.  She reports she is OK then states she needs her oxygen (she saw the nasal cannula on the wall).  She was asked about her symptoms and did not elaborate.   Pulse oximetry performed and her spO2 was 97%.  Patient then asked for a radio.  She was educated on importance of sleep hygiene, calming methods, and paying attention to staff direction.

## 2024-09-29 NOTE — NURSING NOTE
Pt cooperative with auditory hallucinations and continues laughing when being talked to by staff. Pt removing name band  and throwing it under the spare bed in her room, when questioned why she continues to remove her bands this is around the 6 th one placed, no answer given. Pacing on unit most of the day or sitting in milieu with peers. Patient did not lay in bed today and nap which is unusual for her. Medication compliant and meal compliant. Denies SI/HI. Q 7 minute checks maintained.

## 2024-09-29 NOTE — ASSESSMENT & PLAN NOTE
Progress towards goals  Awaiting CRR placement  Prozac is being tapered off  VPA level obtained on 9/26/2024 at 20; increased Depakote to 1000 mg qhs for mood stabilization  Continue medications as prescribed  Clozaril 700 mg nightly for schizoaffective disorder, Clozaril level 9/16 was 541, last ANC on 9/16 was 3.86    Ativan 0.5 mg twice daily for anxiety and paranoia  Melatonin 3 mg nightly for insomnia  Prazosin 2 mg nightly for PTSD associated nightmares   trazodone 50 mg nightly for insomnia    No associated orders from this encounter found during lookback period of 72 hours.

## 2024-09-29 NOTE — NURSING NOTE
Patient resting in bed with no signs of distress.  Remeron given @ 0023 for insomnia deemed effective.

## 2024-09-29 NOTE — PROGRESS NOTES
Progress Note - Behavioral Health   Name: Meli Nowak 59 y.o. female I MRN: [id number]  Unit/Bed#: [facility] 651-01 I Date of Admission: 7/23/2024   Date of Service: 9/29/2024 I Hospital Day: 68     Assessment & Plan  Schizoaffective disorder, bipolar type (HCC)  Progress towards goals  Awaiting CRR placement  Prozac is being tapered off  VPA level obtained on 9/26/2024 at 20; increased Depakote to 1000 mg qhs for mood stabilization  Continue medications as prescribed  Clozaril 700 mg nightly for schizoaffective disorder, Clozaril level 9/16 was 541, last ANC on 9/16 was 3.86    Ativan 0.5 mg twice daily for anxiety and paranoia  Melatonin 3 mg nightly for insomnia  Prazosin 2 mg nightly for PTSD associated nightmares   trazodone 50 mg nightly for insomnia    No associated orders from this encounter found during lookback period of 72 hours.        Plan   Progress Toward Goals: limited improvement    Recommended Treatment:    - Encourage early mobility and having a structured day  - Provide frequent re-orientation, and cognitive stimulation  - Ensure assistive devices are in proper working order (eye-glasses, hearing aids)  - Encourage adequate hydration, nutrition and monitor bowel movements  - Maintain sleep-wake cycle: Uninterrupted sleep time; low-level lighting at night  - Fall precaution  - f/u SLIM recs regarding the medical problems   - f/u labs  - Continue medication titration and treatment plan; adjust medication to optimize treatment response and as clinically indicated. .     Current medications:  Current Facility-Administered Medications   Medication Dose Route Frequency Provider Last Rate    acetaminophen  650 mg Oral Q4H PRN JOSE ELIAS Figueroa      acetaminophen  650 mg Oral Q4H PRN JOSE ELIAS Figueroa      acetaminophen  975 mg Oral Q6H PRN JOSE ELIAS Figueroa      aluminum-magnesium hydroxide-simethicone  30 mL Oral Q4H PRN JOSE ELIAS Puri      Artificial Tears  1 drop Both Eyes Q6H  PRN Dereje Banuelos MD      atorvastatin  10 mg Oral Daily Marie Zielger, CRNP      bisacodyl  10 mg Rectal Daily PRN Marie Ziegler, CRVALE      carvedilol  6.25 mg Oral BID With Meals Marie Ziegler, CRNP      cloZAPine  700 mg Oral HS Marie Ziegler, CRNP      cyanocobalamin  1,000 mcg Oral Daily Marie Ziegler, CRNP      divalproex sodium  1,000 mg Oral HS Marie Ziegler, CRNP      famotidine  20 mg Oral BID Shan Fitzgerald DO      [START ON 9/30/2024] FLUoxetine  20 mg Oral Daily Dereje Banuelos MD      fluticasone  1 puff Inhalation Daily Marie Ziegler, CRVALE      hydrOXYzine HCL  25 mg Oral Q6H PRN Max 4/day Marie Ziegler, CRVALE      hydrOXYzine HCL  50 mg Oral Q6H PRN Max 4/day Marie Ziegler, JOSE ELIAS      ipratropium-albuterol  3 mL Nebulization Q4H PRN Darin Lawton MD      LORazepam  0.5 mg Oral BID Marie Ziegler, JOSE ELIAS      Or    LORazepam  0.5 mg Intramuscular BID Marie Ziegler, CRNP      LORazepam  1 mg Intramuscular Q6H PRN Max 3/day Marie Ziegler, CRNP      LORazepam  1 mg Oral Q6H PRN Max 3/day Marie Ziegler, JOSE ELIAS      melatonin  3 mg Oral HS Marie Ziegler, JOSE ELIAS      mirtazapine  7.5 mg Oral HS PRN Marie Ziegler, CRVALE      Multivitamin  15 mL Oral Daily Marie Ziegler, CRNP      nicotine  1 patch Transdermal Daily Marie Ziegler, CRNP      OLANZapine  2.5 mg Oral Q6H PRN Dereje Banuelos MD      Or    OLANZapine  2.5 mg Intramuscular Q6H PRN Dereje Banuelos MD      OLANZapine  5 mg Oral Q6H PRN Dereje Banuelos MD      Or    OLANZapine  5 mg Intramuscular Q6H PRN Dereje Banuelos MD      ondansetron  4 mg Oral Q6H PRN Darin Lawton MD      polyethylene glycol  17 g Oral Daily PRN Marie Ziegler, JOSE ELIAS      polyethylene glycol  17 g Oral Daily JOSE ELIAS Ortega      prazosin  2 mg Oral HS Marie Ziegler, JOSE ELIAS      senna-docusate sodium  2 tablet Oral HS Rehana Borrego PA-C      traZODone  50 mg Oral HS JOSE ELIAS Figueroa          - Observation: routine             - VS: as per unit protocol  - Encourage group attendance and milieu therapy  - Dispo: To be determined      Subjective     Patient was visited on unit for continuing care; chart reviewed and discussed with the nursing staff.  On approach, the patient was calm and cooperative with ruminating thought process, limited insight and judgement. Remains preoccupied with oxygen, somatic sxs and medications. Denied any changes in appetite, and energy level. Reported interrupted sleep. Denied A/VH currently.  Remains internally preoccupied. Continues to report intrusive thoughts and negative ruminations. Denied SI/HI, intent or plan upon direct inquiry at this time.    Patient continues to be visible in the milieu and remains interactive with staff and peers but requires frequent redirection and reorientation by the staff. Received Remeron 7.5 po PRN for insomnia at 0023 with limited effects.    Patient accepted all offered medications and no adverse effects of medications noted or reported. Clozapine level was 647 on 9/23/24. Given the concerns for manic sxs (potentially induced by uptitration of Prozac), Prozac is being down titrated and Depakote 500 mg increased to 1000 mg nightly on 9/27; VPA level to be checked on 9/30 and doses to be adjusted as indicated.    Objective    Current Mental Status Evaluation:  Appearance and attitude: appeared as stated age, disheveled, dressed in hospital attire, with fair hygiene  Eye contact: fair  Motor Function: within normal limits, intact gait, No PMA/PMR  Gait/station: normal gait/station and normal balance  Speech: normal for rate, rhythm, volume, latency, amount and pressured at times  Language: No overt abnormality  Mood/affect: dysphoric / Affect was constricted but reactive, mood congruent  Thought Processes: ruminating  Thought content: denied suicidal ideations or homicidal ideations, somatic preoccupation, some paranoia, negative thinking, ruminations  Associations:  concrete associations, perseveration  Perceptual disturbances: denies Auditory/Visual/Tactile Hallucinations, appears preoccupied  Orientation: oriented to time, person, place and to the situational context  Cognitive Function: intact  Memory: not cooperative with formal MMSE  Fund of knowledge: aware of current events, aware of past history, and vocabulary average  Impulse control: fair  Insight/judgment: limited/limited          Vital signs in last 24 hours:    Temp:  [97.9 °F (36.6 °C)-98.5 °F (36.9 °C)] 97.9 °F (36.6 °C)  HR:  [] 101  Resp:  [16-18] 18  BP: (125-166)/(67-89) 166/89      Lab results: I have personally reviewed all pertinent laboratory/tests results      Counseling / Coordination of Care  Patient's progress discussed with staff in treatment team meeting.  Medications, treatment progress and treatment plan reviewed with patient.  Medication education provided to patient.  Supportive therapy provided to patient.  Cognitive techniques utilized during the session.  Reassurance and supportive therapy provided.  Reoriented to reality and reassured.  Encouraged participation in milieu and group therapy on the unit.  Crisis/safety plan discussed with patient.       Dereje Banuelos MD  Attending Psychiatrist   [facility]       This note was completed in part utilizing Dragon dictation Software. Grammatical, translation, syntax errors, random word insertions, spelling mistakes, and incomplete sentences may be an occasional consequence of this system secondary to software limitations with voice recognition, ambient noise, and hardware issues. If you have any questions or concerns about the content, text, or information contained within the body of this dictation, please contact the provider for clarification.

## 2024-09-29 NOTE — PLAN OF CARE
Problem: DISCHARGE PLANNING - CARE MANAGEMENT  Goal: Discharge to post-acute care or home with appropriate resources  Description: INTERVENTIONS:  - Conduct assessment to determine patient/family and health care team treatment goals, and need for post-acute services based on payer coverage, community resources, and patient preferences, and barriers to discharge  - Address psychosocial, clinical, and financial barriers to discharge as identified in assessment in conjunction with the patient/family and health care team  - Arrange appropriate level of post-acute services according to patient’s   needs and preference and payer coverage in collaboration with the physician and health care team  - Communicate with and update the patient/family, physician, and health care team regarding progress on the discharge plan  - Arrange appropriate transportation to post-acute venues  Outcome: Progressing     Problem: Prexisting or High Potential for Compromised Skin Integrity  Goal: Skin integrity is maintained or improved  Description: INTERVENTIONS:  - Identify patients at risk for skin breakdown  - Assess and monitor skin integrity  - Assess and monitor nutrition and hydration status  - Monitor labs   - Assess for incontinence   - Turn and reposition patient  - Assist with mobility/ambulation  - Relieve pressure over bony prominences  - Avoid friction and shearing  - Provide appropriate hygiene as needed including keeping skin clean and dry  - Evaluate need for skin moisturizer/barrier cream  - Collaborate with interdisciplinary team   - Patient/family teaching  - Consider wound care consult   Outcome: Progressing     Problem: Alteration in Thoughts and Perception  Goal: Treatment Goal: Gain control of psychotic behaviors/thinking, reduce/eliminate presenting symptoms and demonstrate improved reality functioning upon discharge  Outcome: Progressing  Goal: Verbalize thoughts and feelings  Description: Interventions:  - Promote  a nonjudgmental and trusting relationship with the patient through active listening and therapeutic communication  - Assess patient's level of functioning, behavior and potential for risk  - Engage patient in 1 on 1 interactions  - Encourage patient to express fears, feelings, frustrations, and discuss symptoms    - [place] patient to reality, help patient recognize reality-based thinking   - Administer medications as ordered and assess for potential side effects  - Provide the patient education related to the signs and symptoms of the illness and desired effects of prescribed medications  Outcome: Progressing  Goal: Refrain from acting on delusional thinking/internal stimuli  Description: Interventions:  - Monitor patient closely, per order   - Utilize least restrictive measures   - Set reasonable limits, give positive feedback for acceptable   - Administer medications as ordered and monitor of potential side effects  Outcome: Progressing  Goal: Agree to be compliant with medication regime, as prescribed and report medication side effects  Description: Interventions:  - Offer appropriate PRN medication and supervise ingestion; conduct AIMS, as needed   Outcome: Progressing  Goal: Recognize dysfunctional thoughts, communicate reality-based thoughts at the time of discharge  Description: Interventions:  - Provide medication and psycho-education to assist patient in compliance and developing insight into his/her illness   Outcome: Progressing  Goal: Complete daily ADLs, including personal hygiene independently, as able  Description: Interventions:  - Observe, teach, and assist patient with ADLS  - Monitor and promote a balance of rest/activity, with adequate nutrition and elimination   Outcome: Progressing     Problem: Ineffective Coping  Goal: Identifies ineffective coping skills  Outcome: Progressing  Goal: Demonstrates healthy coping skills  Outcome: Progressing  Goal: Patient/Family participate in treatment and DC  plans  Description: Interventions:  - Provide therapeutic environment  Outcome: Progressing  Goal: Patient/Family verbalizes awareness of resources  Outcome: Progressing  Goal: Understands least restrictive measures  Description: Interventions:  - Utilize least restrictive behavior  Outcome: Progressing  Goal: Free from restraint events  Description: - Utilize least restrictive measures   - Provide behavioral interventions   - Redirect inappropriate behaviors   Outcome: Progressing     Problem: Risk for Self Injury/Neglect  Goal: Treatment Goal: Remain safe during length of stay, learn and adopt new coping skills, and be free of self-injurious ideation, impulses and acts at the time of discharge  Outcome: Progressing  Goal: Verbalize thoughts and feelings  Description: Interventions:  - Assess and re-assess patient's lethality and potential for self-injury  - Engage patient in 1:1 interactions, daily, for a minimum of 15 minutes  - Encourage patient to express feelings, fears, frustrations, hopes  - Establish rapport/trust with patient   Outcome: Progressing  Goal: Refrain from harming self  Description: Interventions:  - Monitor patient closely, per order  - Develop a trusting relationship  - Supervise medication ingestion, monitor effects and side effects   Outcome: Progressing  Goal: Recognize maladaptive responses and adopt new coping mechanisms  Outcome: Progressing     Problem: Depression  Goal: Treatment Goal: Demonstrate behavioral control of depressive symptoms, verbalize feelings of improved mood/affect, and adopt new coping skills prior to discharge  Outcome: Progressing  Goal: Verbalize thoughts and feelings  Description: Interventions:  - Assess and re-assess patient's level of risk   - Engage patient in 1:1 interactions, daily, for a minimum of 15 minutes   - Encourage patient to express feelings, fears, frustrations, hopes   Outcome: Progressing  Goal: Refrain from isolation  Description:  Interventions:  - Develop a trusting relationship   - Encourage socialization   Outcome: Progressing  Goal: Refrain from self-neglect  Outcome: Progressing     Problem: Anxiety  Goal: Anxiety is at manageable level  Description: Interventions:  - Assess and monitor patient's anxiety level.   - Monitor for signs and symptoms (heart palpitations, chest pain, shortness of breath, headaches, nausea, feeling jumpy, restlessness, irritable, apprehensive).   - Collaborate with interdisciplinary team and initiate plan and interventions as ordered.  - [place] patient to unit/surroundings  - Explain treatment plan  - Encourage participation in care  - Encourage verbalization of concerns/fears  - Identify coping mechanisms  - Assist in developing anxiety-reducing skills  - Administer/offer alternative therapies  - Limit or eliminate stimulants  Outcome: Progressing     Problem: SAFETY,RESTRAINT: NV/NON-SELF DESTRUCTIVE BEHAVIOR  Goal: Remains free of harm/injury (restraint for non violent/non self-detsructive behavior)  Description: INTERVENTIONS:  - Instruct patient/family regarding restraint use   - Assess and monitor physiologic and psychological status   - Provide interventions and comfort measures to meet assessed patient needs   - Identify and implement measures to help patient regain control  - Assess readiness for release of restraint   Outcome: Progressing  Goal: Returns to optimal restraint-free functioning  Description: INTERVENTIONS:  - Assess the patient's behavior and symptoms that indicate continued need for restraint  - Identify and implement measures to help patient regain control  - Assess readiness for release of restraint   Outcome: Progressing     Problem: Potential for Falls  Goal: Patient will remain free of falls  Description: INTERVENTIONS:  - Educate patient on patient safety including physical limitations  - Instruct patient to call for assistance with activity   - Consult OT/PT to assist with  strengthening/mobility   - Keep Call bell within reach  - Keep bed low and locked with side rails adjusted as appropriate  - Keep care items and personal belongings within reach  - Initiate and maintain comfort rounds  - Offer Toileting every 2 Hours, in advance of need  - Initiate/Maintain bed and chair alarm  - Obtain necessary fall risk management equipment: walker, wheelchair  - Apply yellow socks and bracelet for high fall risk patients  - Patient moved to room near nurses station  Outcome: Progressing     Problem: Nutrition/Hydration-ADULT  Goal: Nutrient/Hydration intake appropriate for improving, restoring or maintaining nutritional needs  Description: Monitor and assess patient's nutrition/hydration status for malnutrition. Collaborate with interdisciplinary team and initiate plan and interventions as ordered.  Monitor patient's weight and dietary intake as ordered or per policy. Utilize nutrition screening tool and intervene as necessary. Determine patient's food preferences and provide high-protein, high-caloric foods as appropriate.     INTERVENTIONS:  - Monitor oral intake, urinary output, labs, and treatment plans  - Assess nutrition and hydration status and recommend course of action  - Evaluate amount of meals eaten  - Assist patient with eating if necessary   - Allow adequate time for meals  - Recommend/ encourage appropriate diets, oral nutritional supplements, and vitamin/mineral supplements  - Order, calculate, and assess calorie counts as needed  - Recommend, monitor, and adjust tube feedings and TPN/PPN based on assessed needs  - Assess need for intravenous fluids  - Provide specific nutrition/hydration education as appropriate  - Include patient/family/caregiver in decisions related to nutrition  Outcome: Progressing

## 2024-09-30 LAB
ALBUMIN SERPL BCG-MCNC: 3.7 G/DL (ref 3.5–5)
ALP SERPL-CCNC: 86 U/L (ref 34–104)
ALT SERPL W P-5'-P-CCNC: 17 U/L (ref 7–52)
ANION GAP SERPL CALCULATED.3IONS-SCNC: 6 MMOL/L (ref 4–13)
AST SERPL W P-5'-P-CCNC: 15 U/L (ref 13–39)
BASOPHILS # BLD AUTO: 0.08 THOUSANDS/ΜL (ref 0–0.1)
BASOPHILS NFR BLD AUTO: 1 % (ref 0–1)
BILIRUB SERPL-MCNC: 0.28 MG/DL (ref 0.2–1)
BUN SERPL-MCNC: 16 MG/DL (ref 5–25)
CALCIUM SERPL-MCNC: 9.3 MG/DL (ref 8.4–10.2)
CHLORIDE SERPL-SCNC: 102 MMOL/L (ref 96–108)
CK SERPL-CCNC: 38 U/L (ref 26–192)
CO2 SERPL-SCNC: 30 MMOL/L (ref 21–32)
CREAT SERPL-MCNC: 1.13 MG/DL (ref 0.6–1.3)
CRP SERPL QL: 3.2 MG/L
EOSINOPHIL # BLD AUTO: 0 THOUSAND/ΜL (ref 0–0.61)
EOSINOPHIL NFR BLD AUTO: 0 % (ref 0–6)
ERYTHROCYTE [DISTWIDTH] IN BLOOD BY AUTOMATED COUNT: 14.9 % (ref 11.6–15.1)
GFR SERPL CREATININE-BSD FRML MDRD: 54 ML/MIN/1.73SQ M
GLUCOSE SERPL-MCNC: 128 MG/DL (ref 65–140)
HCT VFR BLD AUTO: 43.7 % (ref 34.8–46.1)
HGB BLD-MCNC: 14 G/DL (ref 11.5–15.4)
IMM GRANULOCYTES # BLD AUTO: 0.03 THOUSAND/UL (ref 0–0.2)
IMM GRANULOCYTES NFR BLD AUTO: 0 % (ref 0–2)
LYMPHOCYTES # BLD AUTO: 3.29 THOUSANDS/ΜL (ref 0.6–4.47)
LYMPHOCYTES NFR BLD AUTO: 33 % (ref 14–44)
MCH RBC QN AUTO: 30.7 PG (ref 26.8–34.3)
MCHC RBC AUTO-ENTMCNC: 32 G/DL (ref 31.4–37.4)
MCV RBC AUTO: 96 FL (ref 82–98)
MONOCYTES # BLD AUTO: 0.95 THOUSAND/ΜL (ref 0.17–1.22)
MONOCYTES NFR BLD AUTO: 10 % (ref 4–12)
NEUTROPHILS # BLD AUTO: 5.65 THOUSANDS/ΜL (ref 1.85–7.62)
NEUTS SEG NFR BLD AUTO: 56 % (ref 43–75)
NRBC BLD AUTO-RTO: 0 /100 WBCS
PLATELET # BLD AUTO: 307 THOUSANDS/UL (ref 149–390)
PMV BLD AUTO: 9.9 FL (ref 8.9–12.7)
POTASSIUM SERPL-SCNC: 4.2 MMOL/L (ref 3.5–5.3)
PROT SERPL-MCNC: 6.4 G/DL (ref 6.4–8.4)
RBC # BLD AUTO: 4.56 MILLION/UL (ref 3.81–5.12)
SODIUM SERPL-SCNC: 138 MMOL/L (ref 135–147)
VALPROATE SERPL-MCNC: 26 UG/ML (ref 50–100)
WBC # BLD AUTO: 10 THOUSAND/UL (ref 4.31–10.16)

## 2024-09-30 PROCEDURE — 85025 COMPLETE CBC W/AUTO DIFF WBC: CPT

## 2024-09-30 PROCEDURE — 99232 SBSQ HOSP IP/OBS MODERATE 35: CPT | Performed by: STUDENT IN AN ORGANIZED HEALTH CARE EDUCATION/TRAINING PROGRAM

## 2024-09-30 PROCEDURE — 82550 ASSAY OF CK (CPK): CPT

## 2024-09-30 PROCEDURE — 86140 C-REACTIVE PROTEIN: CPT

## 2024-09-30 PROCEDURE — 80053 COMPREHEN METABOLIC PANEL: CPT

## 2024-09-30 PROCEDURE — 80164 ASSAY DIPROPYLACETIC ACD TOT: CPT

## 2024-09-30 RX ADMIN — DIVALPROEX SODIUM 1000 MG: 500 TABLET, EXTENDED RELEASE ORAL at 21:24

## 2024-09-30 RX ADMIN — CARVEDILOL 6.25 MG: 6.25 TABLET, FILM COATED ORAL at 15:59

## 2024-09-30 RX ADMIN — FLUTICASONE FUROATE 1 PUFF: 100 POWDER RESPIRATORY (INHALATION) at 08:50

## 2024-09-30 RX ADMIN — FLUOXETINE 20 MG: 20 SOLUTION ORAL at 08:51

## 2024-09-30 RX ADMIN — LORAZEPAM 0.5 MG: 0.5 TABLET ORAL at 08:44

## 2024-09-30 RX ADMIN — MELATONIN TAB 3 MG 3 MG: 3 TAB at 21:24

## 2024-09-30 RX ADMIN — FAMOTIDINE 20 MG: 20 TABLET ORAL at 17:02

## 2024-09-30 RX ADMIN — CLOZAPINE 700 MG: 200 TABLET ORAL at 21:25

## 2024-09-30 RX ADMIN — FAMOTIDINE 20 MG: 20 TABLET ORAL at 08:44

## 2024-09-30 RX ADMIN — LORAZEPAM 0.5 MG: 0.5 TABLET ORAL at 17:02

## 2024-09-30 RX ADMIN — PRAZOSIN HYDROCHLORIDE 2 MG: 1 CAPSULE ORAL at 21:25

## 2024-09-30 RX ADMIN — POLYETHYLENE GLYCOL 3350 17 G: 17 POWDER, FOR SOLUTION ORAL at 08:43

## 2024-09-30 RX ADMIN — ATORVASTATIN CALCIUM 10 MG: 10 TABLET, FILM COATED ORAL at 08:44

## 2024-09-30 RX ADMIN — TRAZODONE HYDROCHLORIDE 50 MG: 50 TABLET ORAL at 21:25

## 2024-09-30 RX ADMIN — Medication 15 ML: at 08:43

## 2024-09-30 RX ADMIN — SENNOSIDES AND DOCUSATE SODIUM 2 TABLET: 8.6; 5 TABLET ORAL at 21:24

## 2024-09-30 RX ADMIN — CARVEDILOL 6.25 MG: 6.25 TABLET, FILM COATED ORAL at 08:21

## 2024-09-30 RX ADMIN — CYANOCOBALAMIN TAB 1000 MCG 1000 MCG: 1000 TAB at 08:44

## 2024-09-30 NOTE — NURSING NOTE
Patient is medication and meal compliant. No complaints of pain or discomfort. Patient is isolative to self and to his room. Patient endorses depression and anxiety but does not appear to have symptoms of anxiety. Patient spends the majority of ECT days in bed resting. No interactions with peers noted. Patient to continue with ECT treatments as ordered. Patient is also awaiting placement at this time. Will continue to monitor patient for changes in mood or behavior.

## 2024-09-30 NOTE — TREATMENT TEAM
Pt attended 2 groups.  Pt calm and lacks insight,  Opt was dressed in casual clothes.     09/30/24 1330   Activity/Group Checklist   Group Other (Comment)  (Positive reflection)   Attendance Attended   Attendance Duration (min) 31-45   Interactions Interacted appropriately   Affect/Mood Calm   Goals Achieved Identified feelings;Identified triggers;Discussed coping strategies;Able to listen to others;Able to engage in interactions;Able to reflect/comment on own behavior;Verbalized increased hopefulness;Able to self-disclose;Able to recieve feedback

## 2024-09-30 NOTE — PROGRESS NOTES
09/30/24 0814   Team Meeting   Meeting Type Daily Rounds   Initial Conference Date 09/30/24   Next Conference Date 10/01/24   Team Members Present   Team Members Present Physician;Nurse;;   Physician Team Member Dr Banuelos, JAIR Goldman   Nursing Team Member Clarissa   Care Management Team Member Anum   Social Work Team Member Ayse   Patient/Family Present   Patient Present No   Patient's Family Present No     Responding to internal stimuli, Depakote level this evening, CRP 3.2, CK 38, CRR placement.

## 2024-09-30 NOTE — NURSING NOTE
Patient is medication and meal compliant. No complaints of pain or discomfort. Patient is visible on the unit and social with peers. Patient remains disorganized most of the time. Patient denies depression or anxiety. Patient is awaiting placement at this time. Will continue to monitor patient for changes in mood or behavior.

## 2024-09-30 NOTE — PLAN OF CARE
Problem: Alteration in Thoughts and Perception  Goal: Treatment Goal: Gain control of psychotic behaviors/thinking, reduce/eliminate presenting symptoms and demonstrate improved reality functioning upon discharge  Outcome: Progressing  Goal: Verbalize thoughts and feelings  Description: Interventions:  - Promote a nonjudgmental and trusting relationship with the patient through active listening and therapeutic communication  - Assess patient's level of functioning, behavior and potential for risk  - Engage patient in 1 on 1 interactions  - Encourage patient to express fears, feelings, frustrations, and discuss symptoms    - [place] patient to reality, help patient recognize reality-based thinking   - Administer medications as ordered and assess for potential side effects  - Provide the patient education related to the signs and symptoms of the illness and desired effects of prescribed medications  Outcome: Progressing  Goal: Refrain from acting on delusional thinking/internal stimuli  Description: Interventions:  - Monitor patient closely, per order   - Utilize least restrictive measures   - Set reasonable limits, give positive feedback for acceptable   - Administer medications as ordered and monitor of potential side effects  Outcome: Progressing  Goal: Agree to be compliant with medication regime, as prescribed and report medication side effects  Description: Interventions:  - Offer appropriate PRN medication and supervise ingestion; conduct AIMS, as needed   Outcome: Progressing  Goal: Recognize dysfunctional thoughts, communicate reality-based thoughts at the time of discharge  Description: Interventions:  - Provide medication and psycho-education to assist patient in compliance and developing insight into his/her illness   Outcome: Progressing  Goal: Complete daily ADLs, including personal hygiene independently, as able  Description: Interventions:  - Observe, teach, and assist patient with ADLS  - Monitor and  promote a balance of rest/activity, with adequate nutrition and elimination   Outcome: Progressing     Problem: Risk for Self Injury/Neglect  Goal: Treatment Goal: Remain safe during length of stay, learn and adopt new coping skills, and be free of self-injurious ideation, impulses and acts at the time of discharge  Outcome: Progressing  Goal: Verbalize thoughts and feelings  Description: Interventions:  - Assess and re-assess patient's lethality and potential for self-injury  - Engage patient in 1:1 interactions, daily, for a minimum of 15 minutes  - Encourage patient to express feelings, fears, frustrations, hopes  - Establish rapport/trust with patient   Outcome: Progressing  Goal: Refrain from harming self  Description: Interventions:  - Monitor patient closely, per order  - Develop a trusting relationship  - Supervise medication ingestion, monitor effects and side effects   Outcome: Progressing  Goal: Recognize maladaptive responses and adopt new coping mechanisms  Outcome: Progressing     Problem: Depression  Goal: Treatment Goal: Demonstrate behavioral control of depressive symptoms, verbalize feelings of improved mood/affect, and adopt new coping skills prior to discharge  Outcome: Progressing  Goal: Verbalize thoughts and feelings  Description: Interventions:  - Assess and re-assess patient's level of risk   - Engage patient in 1:1 interactions, daily, for a minimum of 15 minutes   - Encourage patient to express feelings, fears, frustrations, hopes   Outcome: Progressing  Goal: Refrain from isolation  Description: Interventions:  - Develop a trusting relationship   - Encourage socialization   Outcome: Progressing  Goal: Refrain from self-neglect  Outcome: Progressing     Problem: Anxiety  Goal: Anxiety is at manageable level  Description: Interventions:  - Assess and monitor patient's anxiety level.   - Monitor for signs and symptoms (heart palpitations, chest pain, shortness of breath, headaches, nausea,  feeling jumpy, restlessness, irritable, apprehensive).   - Collaborate with interdisciplinary team and initiate plan and interventions as ordered.  - [place] patient to unit/surroundings  - Explain treatment plan  - Encourage participation in care  - Encourage verbalization of concerns/fears  - Identify coping mechanisms  - Assist in developing anxiety-reducing skills  - Administer/offer alternative therapies  - Limit or eliminate stimulants  Outcome: Progressing

## 2024-09-30 NOTE — PROGRESS NOTES
Progress Note - Behavioral Health   Name: Meli Nowak 59 y.o. female I MRN: [id number]  Unit/Bed#: [facility] 651-01 I Date of Admission: 7/23/2024   Date of Service: 9/30/2024 I Hospital Day: 69     Assessment & Plan  Schizoaffective disorder, bipolar type (HCC)  Progress towards goals  Awaiting CRR placement  Prozac is being tapered off  VPA level obtained on 9/26/2024 at 20; increased Depakote to 1000 mg qhs for mood stabilization  Continue medications as prescribed  Clozaril 700 mg nightly for schizoaffective disorder, Clozaril level 9/23 was 647, last ANC on 9/30 was 5.65    Ativan 0.5 mg twice daily for anxiety and paranoia  Melatonin 3 mg nightly for insomnia  Prazosin 2 mg nightly for PTSD associated nightmares   trazodone 50 mg nightly for insomnia    No associated orders from this encounter found during lookback period of 72 hours.        Plan   Progress Toward Goals:   Meli is progressing towards goals of inpatient psychiatric treatment by continued medication compliance and is attending therapeutic modalities on the milieu. However, the patient continues to require inpatient psychiatric hospitalization for continued medication management and titration to optimize symptom reduction, improve sleep hygiene, and demonstrate adequate self-care.    Recommended Treatment: Treatment plan and medication changes discussed and per the attending physician the plan is:    1.Continue with group therapy, milieu therapy and occupational therapy  2.Behavioral Health checks every 7 minutes  3.Continue frequent safety checks and vitals per unit protocol  4.Continue with SLIM medical management as indicated  5.Continue with current medication regimen  6.Will review labs in the a.m.  7.Disposition Planning: Discharge planning and efforts remain ongoing    Behavioral Health Medications: all current active meds have been reviewed and continue current psychiatric medications.  Current Facility-Administered Medications   Medication  Dose Route Frequency Provider Last Rate    acetaminophen  650 mg Oral Q4H PRN Marie Ziegler, CRNP      acetaminophen  650 mg Oral Q4H PRN Marie Ziegler, CRNP      acetaminophen  975 mg Oral Q6H PRN Marie Ziegler, CRNP      aluminum-magnesium hydroxide-simethicone  30 mL Oral Q4H PRN Parris Bolanos, CRNP      Artificial Tears  1 drop Both Eyes Q6H PRN Dereje Banuelos MD      atorvastatin  10 mg Oral Daily Marie Ziegler, CRNP      bisacodyl  10 mg Rectal Daily PRN Marie Ziegler, CRNP      carvedilol  6.25 mg Oral BID With Meals Marie Ziegler, CRNP      cloZAPine  700 mg Oral HS Marie Ziegler, CRNP      cyanocobalamin  1,000 mcg Oral Daily Marie Ziegler, CRNP      divalproex sodium  1,000 mg Oral HS Marie Ziegler, CRNP      famotidine  20 mg Oral BID Shan Fitzgerald, DO      FLUoxetine  20 mg Oral Daily Dereje Banuelos MD      fluticasone  1 puff Inhalation Daily Marie Ziegler, CRNP      hydrOXYzine HCL  25 mg Oral Q6H PRN Max 4/day Marie Ziegler, CRNP      hydrOXYzine HCL  50 mg Oral Q6H PRN Max 4/day Marie Ziegler, CRNP      ipratropium-albuterol  3 mL Nebulization Q4H PRN Darin Lawton MD      LORazepam  0.5 mg Oral BID Marie Ziegler, CRNP      Or    LORazepam  0.5 mg Intramuscular BID Marie Ziegler, CRNP      LORazepam  1 mg Intramuscular Q6H PRN Max 3/day Marie Ziegler, CRNP      LORazepam  1 mg Oral Q6H PRN Max 3/day Marie Ziegler, CRNP      melatonin  3 mg Oral HS Marie Ziegler, CRNP      mirtazapine  7.5 mg Oral HS PRN Marie Ziegler, CRNP      Multivitamin  15 mL Oral Daily Marie Ziegler, CRNP      nicotine  1 patch Transdermal Daily Marie Ziegler, CRNP      OLANZapine  2.5 mg Oral Q6H PRN Dereje Banuelos MD      Or    OLANZapine  2.5 mg Intramuscular Q6H PRN Dereje Banuelos MD      OLANZapine  5 mg Oral Q6H PRN Dereje Banuelos MD      Or    OLANZapine  5 mg Intramuscular Q6H PRN Dereje Banuelos MD      ondansetron  4 mg Oral Q6H PRN Darin Lawton MD       polyethylene glycol  17 g Oral Daily PRN JOSE ELIAS Figueroa      polyethylene glycol  17 g Oral Daily Kristen Ludwig JOSE ELIAS oLgan      prazosin  2 mg Oral HS JOSE ELIAS Figueroa      senna-docusate sodium  2 tablet Oral HS Rehana Borrego PA-C      traZODone  50 mg Oral HS JOSE ELIAS Figueroa         Risks / Benefits of Treatment:  Risks, benefits, and possible side effects of medications explained to patient and patient verbalizes understanding and agreement for treatment.       Subjective    Patient was seen today for continuation of care, records reviewed and patient was discussed with the morning case review team.    Meli was seen today for psychiatric follow-up.  On assessment today, Meli was paranoid and evasive.  He lying in bed, patient is also noted to be more withdrawn with increase in bizarre behavior.  Less manic symptoms noted, patient does continue to be suspicious, and illogical during conversation.  Currently preoccupied with being discharged after lunch, patient continues to be educated but exhibits no insight into current mental condition.  Prozac to be off titrated due to manic symptoms, Depakote level to be obtained this evening due to recent increase of 1000 mg nightly.  Weekly labs of CBC, CK and CRP within normal limits, and ANC currently 5.65.  No changes to be made, we will continue to titrate as needed to assist with symptoms.  Tentative discharge pending CRR placement.    Meli denies acute suicidal/self-harm ideation/intent/plan upon direct inquiry at this time.  Meli remains behaviorally appropriate, no agitation or aggression noted on exam or in report.   Impulse control is intact.  Meli remains adherent to her current psychotropic medication regimen and denies any side effects from medications, as well as none noted on exam.    Psychiatric Review of Systems:  Behavior over the last 24 hours:  unchanged.   Sleep: good  Appetite: good  Medication side effects: none  ROS: no  complaints, denies shortness of breath or chest pain and all other systems are negative for acute changes        Objective    Vitals:  Vitals:    09/30/24 0721   BP: 152/86   Pulse: 88   Resp: 17   Temp: 97.5 °F (36.4 °C)   SpO2: 95%       Laboratory Results:  I have personally reviewed all pertinent laboratory/tests results.    Mental Status Evaluation:  Appearance:  disheveled, marginal hygiene   Behavior:  bizarre, guarded   Speech:  normal rate and volume   Mood:  slightly more depressed, less manic   Affect:  constricted   Thought Process:  disorganized, illogical, perseverative, concrete   Thought Content:  Adventist preoccupation   Perceptual Disturbances: appears preoccupied, talks to self at times   Risk Potential: Suicidal ideation - None  Homicidal ideation - None  Potential for aggression - No   Memory:  recent and remote memory grossly intact   Sensorium  person, place, and time/date      Consciousness:  alert and awake   Attention: attention span and concentration are age appropriate   Insight:  limited   Judgment: limited   Gait/Station: normal gait/station   Motor Activity: no abnormal movements       Counseling / Coordination of Care:  Patient's progress reviewed with nursing staff.  Medications, treatment progress and treatment plan reviewed with patient.  Supportive counseling provided to the patient.    This note was completed in part utilizing Dragon dictation Software. Grammatical, translation, syntax errors, random word insertions, spelling mistakes, and incomplete sentences may be an occasional consequence of this system secondary to software limitations with voice recognition, ambient noise, and hardware issues. If you have any questions or concerns about the content, text, or information contained within the body of this dictation, please contact the provider for clarification

## 2024-09-30 NOTE — ASSESSMENT & PLAN NOTE
Progress towards goals  Awaiting CRR placement  Prozac is being tapered off  VPA level obtained on 9/26/2024 at 20; increased Depakote to 1000 mg qhs for mood stabilization  Continue medications as prescribed  Clozaril 700 mg nightly for schizoaffective disorder, Clozaril level 9/23 was 647, last ANC on 9/30 was 5.65    Ativan 0.5 mg twice daily for anxiety and paranoia  Melatonin 3 mg nightly for insomnia  Prazosin 2 mg nightly for PTSD associated nightmares   trazodone 50 mg nightly for insomnia    No associated orders from this encounter found during lookback period of 72 hours.

## 2024-10-01 PROCEDURE — 99232 SBSQ HOSP IP/OBS MODERATE 35: CPT | Performed by: STUDENT IN AN ORGANIZED HEALTH CARE EDUCATION/TRAINING PROGRAM

## 2024-10-01 RX ADMIN — FLUOXETINE 20 MG: 20 SOLUTION ORAL at 08:37

## 2024-10-01 RX ADMIN — CARVEDILOL 6.25 MG: 6.25 TABLET, FILM COATED ORAL at 17:19

## 2024-10-01 RX ADMIN — DIVALPROEX SODIUM 1000 MG: 500 TABLET, EXTENDED RELEASE ORAL at 21:23

## 2024-10-01 RX ADMIN — NICOTINE 1 PATCH: 7 PATCH, EXTENDED RELEASE TRANSDERMAL at 08:34

## 2024-10-01 RX ADMIN — FLUTICASONE FUROATE 1 PUFF: 100 POWDER RESPIRATORY (INHALATION) at 08:37

## 2024-10-01 RX ADMIN — FAMOTIDINE 20 MG: 20 TABLET ORAL at 17:19

## 2024-10-01 RX ADMIN — TRAZODONE HYDROCHLORIDE 50 MG: 50 TABLET ORAL at 21:23

## 2024-10-01 RX ADMIN — POLYETHYLENE GLYCOL 3350 17 G: 17 POWDER, FOR SOLUTION ORAL at 08:33

## 2024-10-01 RX ADMIN — FAMOTIDINE 20 MG: 20 TABLET ORAL at 08:33

## 2024-10-01 RX ADMIN — CARVEDILOL 6.25 MG: 6.25 TABLET, FILM COATED ORAL at 08:22

## 2024-10-01 RX ADMIN — LORAZEPAM 0.5 MG: 0.5 TABLET ORAL at 17:19

## 2024-10-01 RX ADMIN — MELATONIN TAB 3 MG 3 MG: 3 TAB at 21:23

## 2024-10-01 RX ADMIN — PRAZOSIN HYDROCHLORIDE 2 MG: 1 CAPSULE ORAL at 21:23

## 2024-10-01 RX ADMIN — ATORVASTATIN CALCIUM 10 MG: 10 TABLET, FILM COATED ORAL at 08:33

## 2024-10-01 RX ADMIN — LORAZEPAM 0.5 MG: 0.5 TABLET ORAL at 08:22

## 2024-10-01 RX ADMIN — CLOZAPINE 700 MG: 200 TABLET ORAL at 21:23

## 2024-10-01 RX ADMIN — Medication 15 ML: at 08:33

## 2024-10-01 RX ADMIN — SENNOSIDES AND DOCUSATE SODIUM 2 TABLET: 8.6; 5 TABLET ORAL at 21:23

## 2024-10-01 RX ADMIN — CYANOCOBALAMIN TAB 1000 MCG 1000 MCG: 1000 TAB at 08:33

## 2024-10-01 NOTE — PROGRESS NOTES
"Pt attended all groups.  Pt dressed in hospital gown and disheveled hair.  Pt preoccupied with listening to music and wanting to play during group.  Pt declined to engage in discussion about her self esteem asking to \"move on\".  Pt also needed redirection with peer asking for his paper.      10/01/24 1330   Activity/Group Checklist   Group Other (Comment)  (Self esteem: strengths, assertiveness and qualities)   Attendance Attended;Other (Comment)  (late)   Attendance Duration (min) 46-60   Interactions Disorganized interaction   Affect/Mood Wide   Goals Achieved Identified feelings;Identified triggers;Discussed coping strategies;Discussed self-esteem issues;Able to listen to others;Able to engage in interactions;Able to self-disclose;Able to recieve feedback         "

## 2024-10-01 NOTE — PROGRESS NOTES
10/01/24 1300   Activity/Group Checklist   Group Pet therapy   Attendance Attended   Attendance Duration (min) 16-30   Interactions Interacted appropriately   Affect/Mood Appropriate   Goals Achieved Able to listen to others;Able to engage in interactions

## 2024-10-01 NOTE — NURSING NOTE
Patient is calm and cooperative with care. Patient denies all psych s/s but is visible responding  to Internal stimuli during hs medication pass. Medication compliant. Patient is visible intermittently pacing the hallways on the unit. Patient is able to make needs known. Safety checks ongoing.

## 2024-10-01 NOTE — ASSESSMENT & PLAN NOTE
Progress towards goals  Awaiting CRR placement  Prozac is being tapered off  VPA level obtained on 9/30/2024 at 26;no changes to be made  Continue medications as prescribed  Clozaril 700 mg nightly for schizoaffective disorder, Clozaril level 9/23 was 647, last ANC on 9/30 was 5.65    Ativan 0.5 mg twice daily for anxiety and paranoia  Melatonin 3 mg nightly for insomnia  Prazosin 2 mg nightly for PTSD associated nightmares   trazodone 50 mg nightly for insomnia    No associated orders from this encounter found during lookback period of 72 hours.

## 2024-10-01 NOTE — NURSING NOTE
Pt anxious with guarded affect but med-compliant with good appetite.  VSS.  Attended group.  Pt laughing inappropriately during med pass.  Monitored for safety and support.

## 2024-10-01 NOTE — PROGRESS NOTES
10/01/24 1000   Team Meeting   Meeting Type Daily Rounds   Initial Conference Date 10/01/24   Next Conference Date 10/02/24   Team Members Present   Team Members Present Physician;Nurse;;Other (Discipline and Name)   Physician Team Member Dr Banuelos, JAIR Goldman   Nursing Team Member Clarissa   Care Management Team Member Clarissa   Social Work Team Member Ayse   Other (Discipline and Name) Manfred Duong   Patient/Family Present   Patient Present No   Patient's Family Present No   Attending groups  Depakote level drawn  Less manic  Prozac was d/c'd  CRR referral pending  Clearer in evening than daytime per nursing  Exp Dc 10/18/24

## 2024-10-01 NOTE — CASE MANAGEMENT
Due to pt found to not have capacity, and informed by pt's BCM worker Conchita Hall that pt does not have POA, CM placed call to pt's dtr Chari Nowak tel# [phone]; lvm requesting c/b.

## 2024-10-01 NOTE — CASE MANAGEMENT
"Conchita Hall on unit, visited pt. Reports visit went well although reports pt was \"short\" with BCM. Cm reviewed CRR referral. Conchita provided additional information which was needed for CRR referral.   "

## 2024-10-01 NOTE — PROGRESS NOTES
Progress Note - Behavioral Health   Name: Meli Nowak 59 y.o. female I MRN: [id number]  Unit/Bed#: [facility]01 I Date of Admission: 7/23/2024   Date of Service: 10/1/2024 I Hospital Day: 70     Assessment & Plan  Schizoaffective disorder, bipolar type (HCC)  Progress towards goals  Awaiting CRR placement  Prozac is being tapered off  VPA level obtained on 9/30/2024 at 26;no changes to be made  Continue medications as prescribed  Clozaril 700 mg nightly for schizoaffective disorder, Clozaril level 9/23 was 647, last ANC on 9/30 was 5.65    Ativan 0.5 mg twice daily for anxiety and paranoia  Melatonin 3 mg nightly for insomnia  Prazosin 2 mg nightly for PTSD associated nightmares   trazodone 50 mg nightly for insomnia    No associated orders from this encounter found during lookback period of 72 hours.        Plan   Progress Toward Goals:   Meli is progressing towards goals of inpatient psychiatric treatment by continued medication compliance and is attending therapeutic modalities on the milieu. However, the patient continues to require inpatient psychiatric hospitalization for continued medication management and titration to optimize symptom reduction, improve sleep hygiene, and demonstrate adequate self-care.    Recommended Treatment: Treatment plan and medication changes discussed and per the attending physician the plan is:    1.Continue with group therapy, milieu therapy and occupational therapy  2.Behavioral Health checks every 7 minutes  3.Continue frequent safety checks and vitals per unit protocol  4.Continue with SLIM medical management as indicated  5.Continue with current medication regimen  6.Will review labs in the a.m.  7.Disposition Planning: Discharge planning and efforts remain ongoing    Behavioral Health Medications: all current active meds have been reviewed and continue current psychiatric medications.  Current Facility-Administered Medications   Medication Dose Route Frequency Provider Last  Rate    acetaminophen  650 mg Oral Q4H PRN Marie Ziegler, CRNP      acetaminophen  650 mg Oral Q4H PRN Marie Zieglre, CRNP      acetaminophen  975 mg Oral Q6H PRN Marie Ziegler, CRNP      aluminum-magnesium hydroxide-simethicone  30 mL Oral Q4H PRN Parris Bolanos, CRNP      Artificial Tears  1 drop Both Eyes Q6H PRN Dereje Banuelos MD      atorvastatin  10 mg Oral Daily Marie Ziegler, CRNP      bisacodyl  10 mg Rectal Daily PRN Marie Ziegler, CRNP      carvedilol  6.25 mg Oral BID With Meals Marie Ziegler, CRNP      cloZAPine  700 mg Oral HS Marie Ziegler, CRNP      cyanocobalamin  1,000 mcg Oral Daily Marie Ziegler, CRNP      divalproex sodium  1,000 mg Oral HS Marie Ziegler, CRNP      famotidine  20 mg Oral BID Shan Fitzgerald, DO      fluticasone  1 puff Inhalation Daily Marie Ziegler, CRNP      hydrOXYzine HCL  25 mg Oral Q6H PRN Max 4/day Marie Ziegler, CRNP      hydrOXYzine HCL  50 mg Oral Q6H PRN Max 4/day Marie Ziegler, CRNP      ipratropium-albuterol  3 mL Nebulization Q4H PRN Darin Lawton MD      LORazepam  0.5 mg Oral BID Marie Ziegler, CRNP      Or    LORazepam  0.5 mg Intramuscular BID Marie Ziegler, CRNP      LORazepam  1 mg Intramuscular Q6H PRN Max 3/day Marie Ziegler, CRNP      LORazepam  1 mg Oral Q6H PRN Max 3/day Marie Ziegler, CRNP      melatonin  3 mg Oral HS Marie Ziegler, CRNP      mirtazapine  7.5 mg Oral HS PRN Marie Ziegler, CRNP      Multivitamin  15 mL Oral Daily Marie Ziegler, CRNP      nicotine  1 patch Transdermal Daily Marie Ziegler, CRNP      OLANZapine  2.5 mg Oral Q6H PRN Dereje Banuelos MD      Or    OLANZapine  2.5 mg Intramuscular Q6H PRN Dereje Banuelos MD      OLANZapine  5 mg Oral Q6H PRN Dereje Banuelos MD      Or    OLANZapine  5 mg Intramuscular Q6H PRN Dereje Banuelos MD      ondansetron  4 mg Oral Q6H PRN Darin Lawton MD      polyethylene glycol  17 g Oral Daily PRN Marie Ziegler, CRNP      polyethylene glycol  17 g  "Oral Daily JOSE ELIAS Ortega      prazosin  2 mg Oral HS JOSE ELIAS Figueroa      senna-docusate sodium  2 tablet Oral HS Rehana Borrego PA-C      traZODone  50 mg Oral HS JOSE ELIAS Figueroa         Risks / Benefits of Treatment:  Risks, benefits, and possible side effects of medications explained to patient and patient verbalizes understanding and agreement for treatment.       Subjective    Patient was seen today for continuation of care, records reviewed and patient was discussed with the morning case review team.    Meli was seen today for psychiatric follow-up.  On assessment today, Meli was seen sitting in the hallway.  Stating that she is waiting for the radio, patient is noted to be more internally preoccupied with poor eye contact and increased distractibility.  Radio used to distract patient from voices, she does state that they are not bothersome.  Sikhism preoccupation not as verbalized, patient does have the bible opened at the bedside and stated to this writer \" I pray to God, don't you pray to God\".  Manic symptoms improved patient speech less tangential and pressured.  VPA level obtained on 9/30/2024 at 26, we will not make any changes as patient is showing improvement.  Clozaril also to continue at 700 mg, clozapine level obtained on 9/23/2024 at 647.  Weekly labs to continue q. Monday, labs remain within WNL.  Sleep and appetite remain well.  Tentative discharge pending CRR placement.    Meli denies acute suicidal/self-harm ideation/intent/plan upon direct inquiry at this time.  Meli remains behaviorally appropriate, no agitation or aggression noted on exam or in report.  Impulse control is intact.  Meli remains adherent to her current psychotropic medication regimen and denies any side effects from medications, as well as none noted on exam.    Psychiatric Review of Systems:  Behavior over the last 24 hours:  unchanged.   Sleep: good  Appetite: good  Medication side " effects: none  ROS: no complaints, denies shortness of breath or chest pain and all other systems are negative for acute changes        Objective    Vitals:  Vitals:    10/01/24 0733   BP: 154/69   Pulse: 99   Resp: 17   Temp: (!) 97 °F (36.1 °C)   SpO2: 94%       Laboratory Results:  I have personally reviewed all pertinent laboratory/tests results.  Most Recent Labs:   Lab Results   Component Value Date    WBC 10.00 09/30/2024    RBC 4.56 09/30/2024    HGB 14.0 09/30/2024    HCT 43.7 09/30/2024     09/30/2024    RDW 14.9 09/30/2024    NEUTROABS 5.65 09/30/2024    SODIUM 138 09/30/2024    K 4.2 09/30/2024     09/30/2024    CO2 30 09/30/2024    BUN 16 09/30/2024    CREATININE 1.13 09/30/2024    GLUC 128 09/30/2024    GLUF 98 08/10/2024    CALCIUM 9.3 09/30/2024    AST 15 09/30/2024    ALT 17 09/30/2024    ALKPHOS 86 09/30/2024    TP 6.4 09/30/2024    ALB 3.7 09/30/2024    TBILI 0.28 09/30/2024    VALPROICTOT 26 (L) 09/30/2024    AMMONIA 32 07/03/2024    [id number] 2.000 07/24/2024    HGBA1C 6.4 (H) 05/03/2024     05/03/2024       Mental Status Evaluation:  Appearance:  disheveled, marginal hygiene   Behavior:  bizarre, demanding, guarded   Speech:  normal rate and volume   Mood:  less anxious, less depressed, less irritable, less manic   Affect:  blunted   Thought Process:  illogical, perseverative, concrete   Thought Content:  Restorationist preoccupation, paranoid ideation   Perceptual Disturbances: appears responding to internal stimuli, appears preoccupied   Risk Potential: Suicidal ideation - None  Homicidal ideation - None  Potential for aggression - No   Memory:  recent and remote memory grossly intact   Sensorium  person, place, and time/date      Consciousness:  alert and awake   Attention: attention span and concentration are age appropriate   Insight:  limited   Judgment: limited   Gait/Station: normal gait/station   Motor Activity: no abnormal movements       Counseling / Coordination of  Care:  Patient's progress reviewed with nursing staff.  Medications, treatment progress and treatment plan reviewed with patient.  Supportive counseling provided to the patient.    This note was completed in part utilizing Dragon dictation Software. Grammatical, translation, syntax errors, random word insertions, spelling mistakes, and incomplete sentences may be an occasional consequence of this system secondary to software limitations with voice recognition, ambient noise, and hardware issues. If you have any questions or concerns about the content, text, or information contained within the body of this dictation, please contact the provider for clarification

## 2024-10-02 PROCEDURE — 99232 SBSQ HOSP IP/OBS MODERATE 35: CPT | Performed by: STUDENT IN AN ORGANIZED HEALTH CARE EDUCATION/TRAINING PROGRAM

## 2024-10-02 RX ADMIN — LORAZEPAM 0.5 MG: 0.5 TABLET ORAL at 17:39

## 2024-10-02 RX ADMIN — NICOTINE 1 PATCH: 7 PATCH, EXTENDED RELEASE TRANSDERMAL at 09:03

## 2024-10-02 RX ADMIN — CYANOCOBALAMIN TAB 1000 MCG 1000 MCG: 1000 TAB at 09:03

## 2024-10-02 RX ADMIN — CARVEDILOL 6.25 MG: 6.25 TABLET, FILM COATED ORAL at 16:44

## 2024-10-02 RX ADMIN — CLOZAPINE 700 MG: 200 TABLET ORAL at 21:34

## 2024-10-02 RX ADMIN — ATORVASTATIN CALCIUM 10 MG: 10 TABLET, FILM COATED ORAL at 09:03

## 2024-10-02 RX ADMIN — POLYETHYLENE GLYCOL 3350 17 G: 17 POWDER, FOR SOLUTION ORAL at 09:01

## 2024-10-02 RX ADMIN — FAMOTIDINE 20 MG: 20 TABLET ORAL at 09:03

## 2024-10-02 RX ADMIN — POLYETHYLENE GLYCOL 3350 17 G: 17 POWDER, FOR SOLUTION ORAL at 18:27

## 2024-10-02 RX ADMIN — FLUTICASONE FUROATE 1 PUFF: 100 POWDER RESPIRATORY (INHALATION) at 09:10

## 2024-10-02 RX ADMIN — CARVEDILOL 6.25 MG: 6.25 TABLET, FILM COATED ORAL at 09:02

## 2024-10-02 RX ADMIN — PRAZOSIN HYDROCHLORIDE 2 MG: 1 CAPSULE ORAL at 21:35

## 2024-10-02 RX ADMIN — DIVALPROEX SODIUM 1000 MG: 500 TABLET, EXTENDED RELEASE ORAL at 21:34

## 2024-10-02 RX ADMIN — TRAZODONE HYDROCHLORIDE 50 MG: 50 TABLET ORAL at 21:34

## 2024-10-02 RX ADMIN — FAMOTIDINE 20 MG: 20 TABLET ORAL at 17:40

## 2024-10-02 RX ADMIN — LORAZEPAM 0.5 MG: 0.5 TABLET ORAL at 09:03

## 2024-10-02 RX ADMIN — Medication 15 ML: at 09:03

## 2024-10-02 RX ADMIN — MELATONIN TAB 3 MG 3 MG: 3 TAB at 21:34

## 2024-10-02 RX ADMIN — SENNOSIDES AND DOCUSATE SODIUM 2 TABLET: 8.6; 5 TABLET ORAL at 21:36

## 2024-10-02 NOTE — PROGRESS NOTES
Progress Note - Behavioral Health   Name: Meli Nowak 59 y.o. female I MRN: [id number]  Unit/Bed#: [facility] 651-01 I Date of Admission: 7/23/2024   Date of Service: 10/2/2024 I Hospital Day: 71     Assessment & Plan  Schizoaffective disorder, bipolar type (HCC)  Progress towards goals  Awaiting CRR placement  Prozac is being tapered off  VPA level obtained on 9/30/2024 at 26;no changes to be made  Continue medications as prescribed  Clozaril 700 mg nightly for schizoaffective disorder, Clozaril level 9/23 was 647, last ANC on 9/30 was 5.65    Ativan 0.5 mg twice daily for anxiety and paranoia  Melatonin 3 mg nightly for insomnia  Prazosin 2 mg nightly for PTSD associated nightmares   trazodone 50 mg nightly for insomnia    No associated orders from this encounter found during lookback period of 72 hours.        Plan   Progress Toward Goals:   Meli is progressing towards goals of inpatient psychiatric treatment by continued medication compliance and is attending therapeutic modalities on the milieu. However, the patient continues to require inpatient psychiatric hospitalization for continued medication management and titration to optimize symptom reduction, improve sleep hygiene, and demonstrate adequate self-care.    Recommended Treatment: Treatment plan and medication changes discussed and per the attending physician the plan is:    1.Continue with group therapy, milieu therapy and occupational therapy  2.Behavioral Health checks every 7 minutes  3.Continue frequent safety checks and vitals per unit protocol  4.Continue with SLIM medical management as indicated  5.Continue with current medication regimen  6.Will review labs in the a.m.  7.Disposition Planning: Discharge planning and efforts remain ongoing    Behavioral Health Medications: all current active meds have been reviewed and continue current psychiatric medications.  Current Facility-Administered Medications   Medication Dose Route Frequency Provider Last  Rate    acetaminophen  650 mg Oral Q4H PRN Marie Ziegler, CRNP      acetaminophen  650 mg Oral Q4H PRN Marie Ziegler, CRNP      acetaminophen  975 mg Oral Q6H PRN Marie Ziegler, CRNP      aluminum-magnesium hydroxide-simethicone  30 mL Oral Q4H PRN Parris Bolanos, CRNP      Artificial Tears  1 drop Both Eyes Q6H PRN Dereje Banuelos MD      atorvastatin  10 mg Oral Daily Marie Ziegler, CRNP      bisacodyl  10 mg Rectal Daily PRN Marie Ziegler, CRNP      carvedilol  6.25 mg Oral BID With Meals Marie Ziegler, CRNP      cloZAPine  700 mg Oral HS Marie Ziegler, CRNP      cyanocobalamin  1,000 mcg Oral Daily Marie Ziegler, CRNP      divalproex sodium  1,000 mg Oral HS Marie Ziegler, CRNP      famotidine  20 mg Oral BID Shan Fitzgerald, DO      fluticasone  1 puff Inhalation Daily Marie Ziegler, CRNP      hydrOXYzine HCL  25 mg Oral Q6H PRN Max 4/day Marie Ziegler, CRNP      hydrOXYzine HCL  50 mg Oral Q6H PRN Max 4/day Marie Ziegler, CRNP      ipratropium-albuterol  3 mL Nebulization Q4H PRN Darin Lawton MD      LORazepam  0.5 mg Oral BID Marie Ziegler, CRNP      Or    LORazepam  0.5 mg Intramuscular BID Marie Ziegler, CRNP      LORazepam  1 mg Intramuscular Q6H PRN Max 3/day Marie Ziegler, CRNP      LORazepam  1 mg Oral Q6H PRN Max 3/day Marie Ziegler, CRNP      melatonin  3 mg Oral HS Marie Ziegler, CRNP      mirtazapine  7.5 mg Oral HS PRN Marie Ziegler, CRNP      Multivitamin  15 mL Oral Daily Marie Ziegler, CRNP      nicotine  1 patch Transdermal Daily Marie Ziegler, CRNP      OLANZapine  2.5 mg Oral Q6H PRN Dereje Banuelos MD      Or    OLANZapine  2.5 mg Intramuscular Q6H PRN Dereje Banuelos MD      OLANZapine  5 mg Oral Q6H PRN Dereje Banuelos MD      Or    OLANZapine  5 mg Intramuscular Q6H PRN Dereje Banuelos MD      ondansetron  4 mg Oral Q6H PRN Darin Lawton MD      polyethylene glycol  17 g Oral Daily PRN Marie Ziegler, CRNP      polyethylene glycol  17 g  "Oral Daily JOSE ELIAS Ortega      prazosin  2 mg Oral HS JOSE ELIAS Figueroa      senna-docusate sodium  2 tablet Oral HS Rehana Borrego PA-C      traZODone  50 mg Oral HS JOSE ELIAS Figueroa         Risks / Benefits of Treatment:  Risks, benefits, and possible side effects of medications explained to patient and patient verbalizes understanding and agreement for treatment.       Subjective    Patient was seen today for continuation of care, records reviewed and patient was discussed with the morning case review team.    Meli was seen today for psychiatric follow-up.  On assessment today, Meli was superficially cooperative.  Denying all symptoms at this time, patient is noted to be evasive and distracted.   Stating \" What do you think heaven is like?\",  Meli is more religiously preoccupied today and focused on heaven and what happens when someone passes away.  Therapeutic communication provided, patient is somewhat redirectable.  Clozaril to be continued at 700 mg nightly we will obtain level on Monday with possible augmentation with second antipsychotic due to ongoing bizarre, paranoia illogical behavior and Confucianist/somatic preoccupation.  No other medication change be made at this time.  Tentative discharge pending CRR placement.    Meli denies acute suicidal/self-harm ideation/intent/plan upon direct inquiry at this time.  Meli remains behaviorally appropriate, no agitation or aggression noted on exam or in report.  Impulse control is intact.  Meli remains adherent to her current psychotropic medication regimen and denies any side effects from medications, as well as none noted on exam.    Psychiatric Review of Systems:  Behavior over the last 24 hours:  unchanged.   Sleep: good  Appetite: good  Medication side effects: none  ROS: no complaints, denies shortness of breath or chest pain and all other systems are negative for acute changes        Objective    Vitals:  Vitals:    10/02/24 0752 "   BP: 129/66   Pulse: 86   Resp: 17   Temp: (!) 97.2 °F (36.2 °C)   SpO2: 95%       Laboratory Results:  I have personally reviewed all pertinent laboratory/tests results.  Most Recent Labs:   Lab Results   Component Value Date    WBC 10.00 09/30/2024    RBC 4.56 09/30/2024    HGB 14.0 09/30/2024    HCT 43.7 09/30/2024     09/30/2024    RDW 14.9 09/30/2024    NEUTROABS 5.65 09/30/2024    SODIUM 138 09/30/2024    K 4.2 09/30/2024     09/30/2024    CO2 30 09/30/2024    BUN 16 09/30/2024    CREATININE 1.13 09/30/2024    GLUC 128 09/30/2024    GLUF 98 08/10/2024    CALCIUM 9.3 09/30/2024    AST 15 09/30/2024    ALT 17 09/30/2024    ALKPHOS 86 09/30/2024    TP 6.4 09/30/2024    ALB 3.7 09/30/2024    TBILI 0.28 09/30/2024    VALPROICTOT 26 (L) 09/30/2024    AMMONIA 32 07/03/2024    MBS4AHSOTYOU 2.000 07/24/2024    HGBA1C 6.4 (H) 05/03/2024     05/03/2024       Mental Status Evaluation:  Appearance:  disheveled, marginal hygiene   Behavior:  bizarre, guarded   Speech:  normal rate and volume   Mood:  slightly more depressed   Affect:  constricted   Thought Process:  illogical, perseverative, concrete   Thought Content:  Alevism and somatic preoccupation, paranoid ideation, preoccupation with death   Perceptual Disturbances: auditory hallucinations still present, but less frequent   Risk Potential: Suicidal ideation - None  Homicidal ideation - None  Potential for aggression - No   Memory:  recent and remote memory grossly intact   Sensorium  person, place, and time/date      Consciousness:  alert and awake   Attention: attention span and concentration are age appropriate   Insight:  limited   Judgment: limited   Gait/Station: normal gait/station   Motor Activity: no abnormal movements       Counseling / Coordination of Care:  Patient's progress reviewed with nursing staff.  Medications, treatment progress and treatment plan reviewed with patient.  Supportive counseling provided to the patient.    This  note was completed in part utilizing Dragon dictation Software. Grammatical, translation, syntax errors, random word insertions, spelling mistakes, and incomplete sentences may be an occasional consequence of this system secondary to software limitations with voice recognition, ambient noise, and hardware issues. If you have any questions or concerns about the content, text, or information contained within the body of this dictation, please contact the provider for clarification

## 2024-10-02 NOTE — PROGRESS NOTES
10/02/24 1400   Team Meeting   Meeting Type Daily Rounds   Initial Conference Date 10/02/24   Team Members Present   Team Members Present Physician;Nurse;;Other (Discipline and Name)   Physician Team Member Dr Banuelos, JAIR Goldman   Nursing Team Member Clarissa   Care Management Team Member Clarissa   Social Work Team Member Ayse   Other (Discipline and Name) Manfred Duong   Patient/Family Present   Patient Present No   Patient's Family Present No     Pt is social, visible, intrusive at times  Pt continues to be psychotic despite being on Clozaril  Will obtain a Clozaril level and consider adding a 2nd Antipsychotic (Consider Risperdal)  Projected DC 10/27/24

## 2024-10-02 NOTE — NURSING NOTE
Received Pt at 1500. Pt visible on the unit. More social w/ peers than previously observed by this writer. On assessment denies all psych S+S's, although appears internally preoccupied. Takes medications as prescribed. Compliant w/ mouth check. Q7's maintained. Will cont to provide support as needed.

## 2024-10-02 NOTE — PROGRESS NOTES
Pt attended group.  Pt repetitive with requests asking to play music,.  Pt declines to engage in discussion on mental health recovery asking to continue without her response.  Pt laughing inappropriately in afternoon.      10/02/24 1000   Activity/Group Checklist   Group Community meeting   Attendance Attended;Other (Comment)  (late)   Attendance Duration (min) 31-45   Interactions Disorganized interaction   Affect/Mood Constricted   Goals Achieved Identified feelings;Identified triggers;Discussed self-esteem issues;Discussed coping strategies;Able to manage/cope with feelings;Able to reflect/comment on own behavior;Able to engage in interactions;Able to listen to others

## 2024-10-02 NOTE — PLAN OF CARE
Problem: DISCHARGE PLANNING - CARE MANAGEMENT  Goal: Discharge to post-acute care or home with appropriate resources  Description: INTERVENTIONS:  - Conduct assessment to determine patient/family and health care team treatment goals, and need for post-acute services based on payer coverage, community resources, and patient preferences, and barriers to discharge  - Address psychosocial, clinical, and financial barriers to discharge as identified in assessment in conjunction with the patient/family and health care team  - Arrange appropriate level of post-acute services according to patient’s   needs and preference and payer coverage in collaboration with the physician and health care team  - Communicate with and update the patient/family, physician, and health care team regarding progress on the discharge plan  - Arrange appropriate transportation to post-acute venues  Outcome: Progressing     Problem: Prexisting or High Potential for Compromised Skin Integrity  Goal: Skin integrity is maintained or improved  Description: INTERVENTIONS:  - Identify patients at risk for skin breakdown  - Assess and monitor skin integrity  - Assess and monitor nutrition and hydration status  - Monitor labs   - Assess for incontinence   - Turn and reposition patient  - Assist with mobility/ambulation  - Relieve pressure over bony prominences  - Avoid friction and shearing  - Provide appropriate hygiene as needed including keeping skin clean and dry  - Evaluate need for skin moisturizer/barrier cream  - Collaborate with interdisciplinary team   - Patient/family teaching  - Consider wound care consult   Outcome: Progressing     Problem: Alteration in Thoughts and Perception  Goal: Treatment Goal: Gain control of psychotic behaviors/thinking, reduce/eliminate presenting symptoms and demonstrate improved reality functioning upon discharge  Outcome: Progressing  Goal: Verbalize thoughts and feelings  Description: Interventions:  - Promote  a nonjudgmental and trusting relationship with the patient through active listening and therapeutic communication  - Assess patient's level of functioning, behavior and potential for risk  - Engage patient in 1 on 1 interactions  - Encourage patient to express fears, feelings, frustrations, and discuss symptoms    - [place] patient to reality, help patient recognize reality-based thinking   - Administer medications as ordered and assess for potential side effects  - Provide the patient education related to the signs and symptoms of the illness and desired effects of prescribed medications  Outcome: Progressing  Goal: Refrain from acting on delusional thinking/internal stimuli  Description: Interventions:  - Monitor patient closely, per order   - Utilize least restrictive measures   - Set reasonable limits, give positive feedback for acceptable   - Administer medications as ordered and monitor of potential side effects  Outcome: Progressing  Goal: Agree to be compliant with medication regime, as prescribed and report medication side effects  Description: Interventions:  - Offer appropriate PRN medication and supervise ingestion; conduct AIMS, as needed   Outcome: Progressing  Goal: Attend and participate in unit activities, including therapeutic, recreational, and educational groups  Description: Interventions:  -Encourage Visitation and family involvement in care  Outcome: Progressing  Goal: Recognize dysfunctional thoughts, communicate reality-based thoughts at the time of discharge  Description: Interventions:  - Provide medication and psycho-education to assist patient in compliance and developing insight into his/her illness   Outcome: Progressing  Goal: Complete daily ADLs, including personal hygiene independently, as able  Description: Interventions:  - Observe, teach, and assist patient with ADLS  - Monitor and promote a balance of rest/activity, with adequate nutrition and elimination   Outcome: Progressing      Problem: Ineffective Coping  Goal: Identifies ineffective coping skills  Outcome: Progressing  Goal: Demonstrates healthy coping skills  Outcome: Progressing  Goal: Participates in unit activities  Description: Interventions:  - Provide therapeutic environment   - Provide required programming   - Redirect inappropriate behaviors   Outcome: Progressing  Goal: Patient/Family participate in treatment and DC plans  Description: Interventions:  - Provide therapeutic environment  Outcome: Progressing  Goal: Patient/Family verbalizes awareness of resources  Outcome: Progressing  Goal: Understands least restrictive measures  Description: Interventions:  - Utilize least restrictive behavior  Outcome: Progressing  Goal: Free from restraint events  Description: - Utilize least restrictive measures   - Provide behavioral interventions   - Redirect inappropriate behaviors   Outcome: Progressing     Problem: Risk for Self Injury/Neglect  Goal: Treatment Goal: Remain safe during length of stay, learn and adopt new coping skills, and be free of self-injurious ideation, impulses and acts at the time of discharge  Outcome: Progressing  Goal: Verbalize thoughts and feelings  Description: Interventions:  - Assess and re-assess patient's lethality and potential for self-injury  - Engage patient in 1:1 interactions, daily, for a minimum of 15 minutes  - Encourage patient to express feelings, fears, frustrations, hopes  - Establish rapport/trust with patient   Outcome: Progressing  Goal: Refrain from harming self  Description: Interventions:  - Monitor patient closely, per order  - Develop a trusting relationship  - Supervise medication ingestion, monitor effects and side effects   Outcome: Progressing  Goal: Attend and participate in unit activities, including therapeutic, recreational, and educational groups  Description: Interventions:  - Provide therapeutic and educational activities daily, encourage attendance and participation,  and document same in the medical record  - Obtain collateral information, encourage visitation and family involvement in care   Outcome: Progressing  Goal: Recognize maladaptive responses and adopt new coping mechanisms  Outcome: Progressing     Problem: Depression  Goal: Treatment Goal: Demonstrate behavioral control of depressive symptoms, verbalize feelings of improved mood/affect, and adopt new coping skills prior to discharge  Outcome: Progressing  Goal: Verbalize thoughts and feelings  Description: Interventions:  - Assess and re-assess patient's level of risk   - Engage patient in 1:1 interactions, daily, for a minimum of 15 minutes   - Encourage patient to express feelings, fears, frustrations, hopes   Outcome: Progressing  Goal: Refrain from isolation  Description: Interventions:  - Develop a trusting relationship   - Encourage socialization   Outcome: Progressing  Goal: Refrain from self-neglect  Outcome: Progressing  Goal: Attend and participate in unit activities, including therapeutic, recreational, and educational groups  Description: Interventions:  - Provide therapeutic and educational activities daily, encourage attendance and participation, and document same in the medical record   Outcome: Progressing     Problem: Anxiety  Goal: Anxiety is at manageable level  Description: Interventions:  - Assess and monitor patient's anxiety level.   - Monitor for signs and symptoms (heart palpitations, chest pain, shortness of breath, headaches, nausea, feeling jumpy, restlessness, irritable, apprehensive).   - Collaborate with interdisciplinary team and initiate plan and interventions as ordered.  - [place] patient to unit/surroundings  - Explain treatment plan  - Encourage participation in care  - Encourage verbalization of concerns/fears  - Identify coping mechanisms  - Assist in developing anxiety-reducing skills  - Administer/offer alternative therapies  - Limit or eliminate stimulants  Outcome:  Progressing     Problem: SAFETY,RESTRAINT: NV/NON-SELF DESTRUCTIVE BEHAVIOR  Goal: Remains free of harm/injury (restraint for non violent/non self-detsructive behavior)  Description: INTERVENTIONS:  - Instruct patient/family regarding restraint use   - Assess and monitor physiologic and psychological status   - Provide interventions and comfort measures to meet assessed patient needs   - Identify and implement measures to help patient regain control  - Assess readiness for release of restraint   Outcome: Progressing  Goal: Returns to optimal restraint-free functioning  Description: INTERVENTIONS:  - Assess the patient's behavior and symptoms that indicate continued need for restraint  - Identify and implement measures to help patient regain control  - Assess readiness for release of restraint   Outcome: Progressing     Problem: Potential for Falls  Goal: Patient will remain free of falls  Description: INTERVENTIONS:  - Educate patient on patient safety including physical limitations  - Instruct patient to call for assistance with activity   - Consult OT/PT to assist with strengthening/mobility   - Keep Call bell within reach  - Keep bed low and locked with side rails adjusted as appropriate  - Keep care items and personal belongings within reach  - Initiate and maintain comfort rounds  - Offer Toileting every 2 Hours, in advance of need  - Initiate/Maintain bed and chair alarm  - Obtain necessary fall risk management equipment: walker, wheelchair  - Apply yellow socks and bracelet for high fall risk patients  - Patient moved to room near nurses station  Outcome: Progressing     Problem: Nutrition/Hydration-ADULT  Goal: Nutrient/Hydration intake appropriate for improving, restoring or maintaining nutritional needs  Description: Monitor and assess patient's nutrition/hydration status for malnutrition. Collaborate with interdisciplinary team and initiate plan and interventions as ordered.  Monitor patient's weight and  dietary intake as ordered or per policy. Utilize nutrition screening tool and intervene as necessary. Determine patient's food preferences and provide high-protein, high-caloric foods as appropriate.     INTERVENTIONS:  - Monitor oral intake, urinary output, labs, and treatment plans  - Assess nutrition and hydration status and recommend course of action  - Evaluate amount of meals eaten  - Assist patient with eating if necessary   - Allow adequate time for meals  - Recommend/ encourage appropriate diets, oral nutritional supplements, and vitamin/mineral supplements  - Order, calculate, and assess calorie counts as needed  - Recommend, monitor, and adjust tube feedings and TPN/PPN based on assessed needs  - Assess need for intravenous fluids  - Provide specific nutrition/hydration education as appropriate  - Include patient/family/caregiver in decisions related to nutrition  Outcome: Progressing

## 2024-10-02 NOTE — NURSING NOTE
"Patient is bizarre, disorganized, and internally preoccupied. Visible intermittently and minimally social w/ peers. Appetite is adequate. Hygiene is fair. Suspicious of medications but accepted. Compliant w/ ordered mouth checks. Patient telling RN \"I'm alive. I didn't die last night. Are we still playing hospital?\" Denies psychiatric symptoms. Fall precautions in place. Continuous rounding maintained.   "

## 2024-10-02 NOTE — NURSING NOTE
Patient remains bizarre, suspicious, and preoccupied. Withdrawn to bedroom listening to radio. Denies any needs or concerns.

## 2024-10-03 PROCEDURE — 99232 SBSQ HOSP IP/OBS MODERATE 35: CPT | Performed by: STUDENT IN AN ORGANIZED HEALTH CARE EDUCATION/TRAINING PROGRAM

## 2024-10-03 RX ORDER — RISPERIDONE 1 MG/1
1 TABLET ORAL
Status: DISCONTINUED | OUTPATIENT
Start: 2024-10-03 | End: 2024-10-07

## 2024-10-03 RX ORDER — BISACODYL 5 MG/1
10 TABLET, DELAYED RELEASE ORAL DAILY PRN
Status: DISCONTINUED | OUTPATIENT
Start: 2024-10-03 | End: 2024-12-30 | Stop reason: HOSPADM

## 2024-10-03 RX ADMIN — CARVEDILOL 6.25 MG: 6.25 TABLET, FILM COATED ORAL at 10:03

## 2024-10-03 RX ADMIN — ATORVASTATIN CALCIUM 10 MG: 10 TABLET, FILM COATED ORAL at 10:03

## 2024-10-03 RX ADMIN — BISACODYL 10 MG: 5 TABLET, COATED ORAL at 18:36

## 2024-10-03 RX ADMIN — CARVEDILOL 6.25 MG: 6.25 TABLET, FILM COATED ORAL at 17:34

## 2024-10-03 RX ADMIN — POLYETHYLENE GLYCOL 3350 17 G: 17 POWDER, FOR SOLUTION ORAL at 10:03

## 2024-10-03 RX ADMIN — FLUTICASONE FUROATE 1 PUFF: 100 POWDER RESPIRATORY (INHALATION) at 10:02

## 2024-10-03 RX ADMIN — FAMOTIDINE 20 MG: 20 TABLET ORAL at 17:35

## 2024-10-03 RX ADMIN — CLOZAPINE 700 MG: 200 TABLET ORAL at 21:08

## 2024-10-03 RX ADMIN — MELATONIN TAB 3 MG 3 MG: 3 TAB at 21:09

## 2024-10-03 RX ADMIN — LORAZEPAM 0.5 MG: 0.5 TABLET ORAL at 17:35

## 2024-10-03 RX ADMIN — LORAZEPAM 0.5 MG: 0.5 TABLET ORAL at 10:03

## 2024-10-03 RX ADMIN — Medication 15 ML: at 10:02

## 2024-10-03 RX ADMIN — NICOTINE 1 PATCH: 7 PATCH, EXTENDED RELEASE TRANSDERMAL at 10:05

## 2024-10-03 RX ADMIN — TRAZODONE HYDROCHLORIDE 50 MG: 50 TABLET ORAL at 21:09

## 2024-10-03 RX ADMIN — PRAZOSIN HYDROCHLORIDE 2 MG: 1 CAPSULE ORAL at 21:09

## 2024-10-03 RX ADMIN — CYANOCOBALAMIN TAB 1000 MCG 1000 MCG: 1000 TAB at 10:04

## 2024-10-03 RX ADMIN — RISPERIDONE 1 MG: 1 TABLET, FILM COATED ORAL at 21:09

## 2024-10-03 RX ADMIN — FAMOTIDINE 20 MG: 20 TABLET ORAL at 10:04

## 2024-10-03 RX ADMIN — DIVALPROEX SODIUM 1000 MG: 500 TABLET, EXTENDED RELEASE ORAL at 21:09

## 2024-10-03 RX ADMIN — SENNOSIDES AND DOCUSATE SODIUM 2 TABLET: 8.6; 5 TABLET ORAL at 21:09

## 2024-10-03 NOTE — NURSING NOTE
"Patient remains bizarre, paranoid, and delusional. Irritable w/ pressured speech. [facility]ening to [facility] because no one is taking her to the dentist r/t \"only having 11 teeth.\" Patient states her teeth got knocked out and now they are in her stomach. Patient also stating staff killed a man. Repeatedly making comments that there is a \"dead sherri\" in her bathroom and making staff check her bathroom. Laying in bed w/ no sheets. When encouraged to place sheets on bed patient stated \"you do it.\" Pacing halls and suspiciously staring at staff. Continuous rounding maintained.   "

## 2024-10-03 NOTE — PROGRESS NOTES
Progress Note - Behavioral Health   Name: Meli Nowak 59 y.o. female I MRN: [id number]  Unit/Bed#: [facility] 651-01 I Date of Admission: 7/23/2024   Date of Service: 10/3/2024 I Hospital Day: 72     Assessment & Plan  Schizoaffective disorder, bipolar type (HCC)  Progress towards goals  Awaiting CRR placement  Prozac is being tapered off  VPA level obtained on 9/30/2024 at 26;no changes to be made  Two antipsychotics utilized secondary to multiple failures of monotherapy  Continue medications as prescribed  Clozaril 700 mg nightly for schizoaffective disorder, Clozaril level 9/23 was 647, last ANC on 9/30 was 5.65    Risperdal 1 mg qhs for ongoing psychosis   Ativan 0.5 mg twice daily for anxiety and paranoia  Melatonin 3 mg nightly for insomnia  Prazosin 2 mg nightly for PTSD associated nightmares   trazodone 50 mg nightly for insomnia    No associated orders from this encounter found during lookback period of 72 hours.        Plan   Progress Toward Goals:   Meli is progressing towards goals of inpatient psychiatric treatment by continued medication compliance and is attending therapeutic modalities on the milieu. However, the patient continues to require inpatient psychiatric hospitalization for continued medication management and titration to optimize symptom reduction, improve sleep hygiene, and demonstrate adequate self-care.    Recommended Treatment: Treatment plan and medication changes discussed and per the attending physician the plan is:    1.Continue with group therapy, milieu therapy and occupational therapy  2.Behavioral Health checks every 7 minutes  3.Continue frequent safety checks and vitals per unit protocol  4.Continue with SLIM medical management as indicated  5.Continue with current medication regimen  6.Will review labs in the a.m.  7.Disposition Planning: Discharge planning and efforts remain ongoing    Behavioral Health Medications: all current active meds have been reviewed and continue  current psychiatric medications.  Current Facility-Administered Medications   Medication Dose Route Frequency Provider Last Rate    acetaminophen  650 mg Oral Q4H PRN Marie Ziegler, CRNP      acetaminophen  650 mg Oral Q4H PRN Marie Ziegler, CRNP      acetaminophen  975 mg Oral Q6H PRN Marie Ziegler, CRNP      aluminum-magnesium hydroxide-simethicone  30 mL Oral Q4H PRN Parris Bolanos, CRNP      Artificial Tears  1 drop Both Eyes Q6H PRN Dereje Banuelos MD      atorvastatin  10 mg Oral Daily Marie Ziegler, CRNP      bisacodyl  10 mg Rectal Daily PRN Marie Ziegler, CRNP      carvedilol  6.25 mg Oral BID With Meals Marie Ziegler, CRNP      cloZAPine  700 mg Oral HS Marie Ziegler, CRNP      cyanocobalamin  1,000 mcg Oral Daily Marie Ziegler, CRNP      divalproex sodium  1,000 mg Oral HS Marie Ziegler, CRNP      famotidine  20 mg Oral BID Shan Fitzgerald,       fluticasone  1 puff Inhalation Daily Marie Ziegler, CRNP      hydrOXYzine HCL  25 mg Oral Q6H PRN Max 4/day Marie Ziegler, CRNP      hydrOXYzine HCL  50 mg Oral Q6H PRN Max 4/day Marie Ziegler, CRNP      ipratropium-albuterol  3 mL Nebulization Q4H PRN Darin Lawton MD      LORazepam  0.5 mg Oral BID Marie Ziegler, CRNP      Or    LORazepam  0.5 mg Intramuscular BID Marie Ziegler, CRNP      LORazepam  1 mg Intramuscular Q6H PRN Max 3/day Marie Ziegler, CRNP      LORazepam  1 mg Oral Q6H PRN Max 3/day Marie Ziegler, CRNP      melatonin  3 mg Oral HS Marie Ziegler, CRNP      mirtazapine  7.5 mg Oral HS PRN Marie Ziegler, CRNP      Multivitamin  15 mL Oral Daily Marie Ziegler, CRNP      nicotine  1 patch Transdermal Daily Marie Ziegler, CRNP      OLANZapine  2.5 mg Oral Q6H PRN Dereje Banuelos MD      Or    OLANZapine  2.5 mg Intramuscular Q6H PRN Dereje Banuelos MD      OLANZapine  5 mg Oral Q6H PRN Dereje Banuelos MD      Or    OLANZapine  5 mg Intramuscular Q6H PRN Dereje Banuelos MD      ondansetron  4 mg Oral Q6H  PAVAN Lawton MD      polyethylene glycol  17 g Oral Daily JOSE ELIAS Sena      polyethylene glycol  17 g Oral Daily JOSE ELIAS Ortega      prazosin  2 mg Oral HS JOSE ELIAS Figueroa      risperiDONE  1 mg Oral HS JOSE ELIAS Figueroa      senna-docusate sodium  2 tablet Oral HS Rehana Borrego PA-C      traZODone  50 mg Oral HS JOSE ELIAS Figueroa         Risks / Benefits of Treatment:  Risks, benefits, and possible side effects of medications explained to patient and patient verbalizes understanding and agreement for treatment.       Subjective    Patient was seen today for continuation of care, records reviewed and patient was discussed with the morning case review team.    Meli was seen today for psychiatric follow-up.  On assessment today, Meli was disheveled and disorganized.  Stating that she does not want to talk to this writer today, patient creasingly paranoid and states that individuals were laughing at her.  Redirection mildly effective, patient continues to be bizarre at times, intrusive with peers and religiously/somatically preoccupied.  Clozaril currently prescribed at 700 mg nightly, Clozaril level obtained on 9/27 647.  Moderate affectivity noted, we will add second antipsychotic secondary to multiple failures of monotherapy.  Risperdal 1 mg nightly to be added after consultation with pharmacy.  We will also obtain Clozaril level on Monday, 10/7/2024 for continued drug monitoring and therapy.  No other medication change be made at this time.  Tentative discharge ongoing as patient awaits CRR placement.    Meli denies acute suicidal/self-harm ideation/intent/plan upon direct inquiry at this time.  Meli remains behaviorally appropriate, no agitation or aggression noted on exam or in report.  Impulse control is intact.  Meli remains adherent to her current psychotropic medication regimen and denies any side effects from medications, as well as none noted on  exam.    Psychiatric Review of Systems:  Behavior over the last 24 hours:  unchanged.   Sleep: good  Appetite: good  Medication side effects: none  ROS: no complaints, denies shortness of breath or chest pain and all other systems are negative for acute changes        Objective    Vitals:  Vitals:    10/03/24 1001   BP: 154/85   Pulse: 100   Resp:    Temp:    SpO2: 97%       Laboratory Results:  I have personally reviewed all pertinent laboratory/tests results.  Most Recent Labs:   Lab Results   Component Value Date    WBC 10.00 09/30/2024    RBC 4.56 09/30/2024    HGB 14.0 09/30/2024    HCT 43.7 09/30/2024     09/30/2024    RDW 14.9 09/30/2024    NEUTROABS 5.65 09/30/2024    SODIUM 138 09/30/2024    K 4.2 09/30/2024     09/30/2024    CO2 30 09/30/2024    BUN 16 09/30/2024    CREATININE 1.13 09/30/2024    GLUC 128 09/30/2024    GLUF 98 08/10/2024    CALCIUM 9.3 09/30/2024    AST 15 09/30/2024    ALT 17 09/30/2024    ALKPHOS 86 09/30/2024    TP 6.4 09/30/2024    ALB 3.7 09/30/2024    TBILI 0.28 09/30/2024    VALPROICTOT 26 (L) 09/30/2024    AMMONIA 32 07/03/2024    [id number] 2.000 07/24/2024    HGBA1C 6.4 (H) 05/03/2024     05/03/2024       Mental Status Evaluation:  Appearance:  disheveled, marginal hygiene   Behavior:  bizarre, guarded, less cooperative   Speech:  normal rate and volume   Mood:  irritable   Affect:  constricted   Thought Process:  illogical, perseverative, concrete   Thought Content:  Latter day and somatic preoccupation, paranoid ideation   Perceptual Disturbances: appears preoccupied, talks to self at times   Risk Potential: Suicidal ideation - None  Homicidal ideation - None  Potential for aggression - No   Memory:  recent and remote memory grossly intact   Sensorium  person, place, and time/date      Consciousness:  alert and awake   Attention: attention span and concentration are age appropriate   Insight:  limited   Judgment: limited   Gait/Station: normal gait/station    Motor Activity: no abnormal movements       Counseling / Coordination of Care:  Patient's progress reviewed with nursing staff.  Medications, treatment progress and treatment plan reviewed with patient.  Supportive counseling provided to the patient.    This note was completed in part utilizing Dragon dictation Software. Grammatical, translation, syntax errors, random word insertions, spelling mistakes, and incomplete sentences may be an occasional consequence of this system secondary to software limitations with voice recognition, ambient noise, and hardware issues. If you have any questions or concerns about the content, text, or information contained within the body of this dictation, please contact the provider for clarification

## 2024-10-03 NOTE — PROGRESS NOTES
10/03/24 0822   Team Meeting   Meeting Type Daily Rounds   Initial Conference Date 10/03/24   Next Conference Date 10/04/24   Team Members Present   Team Members Present Physician;Nurse;;;Other (Discipline and Name)   Physician Team Member Dr Banuelos, JAIR Goldman   Nursing Team Member Clarissa   Care Management Team Member Anum   Social Work Team Member Ayse   Patient/Family Present   Patient Present No   Patient's Family Present No     Bizarre at times, compliant with med checks, takes her a while to take all of meds, inappropriately laughing in group, may add risperdal mg QHS.

## 2024-10-03 NOTE — ASSESSMENT & PLAN NOTE
Progress towards goals  Awaiting CRR placement  Prozac is being tapered off  VPA level obtained on 9/30/2024 at 26;no changes to be made  Two antipsychotics utilized secondary to multiple failures of monotherapy  Continue medications as prescribed  Clozaril 700 mg nightly for schizoaffective disorder, Clozaril level 9/23 was 647, last ANC on 9/30 was 5.65    Risperdal 1 mg qhs for ongoing psychosis   Ativan 0.5 mg twice daily for anxiety and paranoia  Melatonin 3 mg nightly for insomnia  Prazosin 2 mg nightly for PTSD associated nightmares   trazodone 50 mg nightly for insomnia    No associated orders from this encounter found during lookback period of 72 hours.

## 2024-10-03 NOTE — PLAN OF CARE
Problem: Prexisting or High Potential for Compromised Skin Integrity  Goal: Skin integrity is maintained or improved  Description: INTERVENTIONS:  - Identify patients at risk for skin breakdown  - Assess and monitor skin integrity  - Assess and monitor nutrition and hydration status  - Monitor labs   - Assess for incontinence   - Turn and reposition patient  - Assist with mobility/ambulation  - Relieve pressure over bony prominences  - Avoid friction and shearing  - Provide appropriate hygiene as needed including keeping skin clean and dry  - Evaluate need for skin moisturizer/barrier cream  - Collaborate with interdisciplinary team   - Patient/family teaching  - Consider wound care consult   10/3/2024 0309 by Miki Pires, RN  Outcome: Progressing  10/3/2024 0305 by Miki Pires, RN  Outcome: Progressing     Problem: Alteration in Thoughts and Perception  Goal: Verbalize thoughts and feelings  Description: Interventions:  - Promote a nonjudgmental and trusting relationship with the patient through active listening and therapeutic communication  - Assess patient's level of functioning, behavior and potential for risk  - Engage patient in 1 on 1 interactions  - Encourage patient to express fears, feelings, frustrations, and discuss symptoms    - [place] patient to reality, help patient recognize reality-based thinking   - Administer medications as ordered and assess for potential side effects  - Provide the patient education related to the signs and symptoms of the illness and desired effects of prescribed medications  Outcome: Progressing  Goal: Agree to be compliant with medication regime, as prescribed and report medication side effects  Description: Interventions:  - Offer appropriate PRN medication and supervise ingestion; conduct AIMS, as needed   Outcome: Progressing  Goal: Attend and participate in unit activities, including therapeutic, recreational, and educational groups  Description:  Interventions:  -Encourage Visitation and family involvement in care  Outcome: Progressing  Goal: Recognize dysfunctional thoughts, communicate reality-based thoughts at the time of discharge  Description: Interventions:  - Provide medication and psycho-education to assist patient in compliance and developing insight into his/her illness   Outcome: Progressing  Goal: Complete daily ADLs, including personal hygiene independently, as able  Description: Interventions:  - Observe, teach, and assist patient with ADLS  - Monitor and promote a balance of rest/activity, with adequate nutrition and elimination   Outcome: Progressing     Problem: Ineffective Coping  Goal: Demonstrates healthy coping skills  Outcome: Progressing     Problem: DISCHARGE PLANNING - CARE MANAGEMENT  Goal: Discharge to post-acute care or home with appropriate resources  Description: INTERVENTIONS:  - Conduct assessment to determine patient/family and health care team treatment goals, and need for post-acute services based on payer coverage, community resources, and patient preferences, and barriers to discharge  - Address psychosocial, clinical, and financial barriers to discharge as identified in assessment in conjunction with the patient/family and health care team  - Arrange appropriate level of post-acute services according to patient’s   needs and preference and payer coverage in collaboration with the physician and health care team  - Communicate with and update the patient/family, physician, and health care team regarding progress on the discharge plan  - Arrange appropriate transportation to post-acute venues  10/3/2024 0309 by Miki Pires RN  Outcome: Not Progressing  10/3/2024 0305 by Miki Pires, RN  Outcome: Not Progressing     Problem: Alteration in Thoughts and Perception  Goal: Treatment Goal: Gain control of psychotic behaviors/thinking, reduce/eliminate presenting symptoms and demonstrate improved reality functioning upon  discharge  10/3/2024 0309 by Miki Pires, RN  Outcome: Not Progressing  10/3/2024 0305 by Miki Pires, RN  Outcome: Not Progressing  Goal: Refrain from acting on delusional thinking/internal stimuli  Description: Interventions:  - Monitor patient closely, per order   - Utilize least restrictive measures   - Set reasonable limits, give positive feedback for acceptable   - Administer medications as ordered and monitor of potential side effects  Outcome: Not Progressing     Problem: Ineffective Coping  Goal: Identifies ineffective coping skills  Outcome: Not Progressing

## 2024-10-03 NOTE — NURSING NOTE
Patient withdrawn to her room listening  to radio, denies any needs or concerns, medication compliant, safety measures maintained.

## 2024-10-03 NOTE — PLAN OF CARE
Problem: Prexisting or High Potential for Compromised Skin Integrity  Goal: Skin integrity is maintained or improved  Description: INTERVENTIONS:  - Identify patients at risk for skin breakdown  - Assess and monitor skin integrity  - Assess and monitor nutrition and hydration status  - Monitor labs   - Assess for incontinence   - Turn and reposition patient  - Assist with mobility/ambulation  - Relieve pressure over bony prominences  - Avoid friction and shearing  - Provide appropriate hygiene as needed including keeping skin clean and dry  - Evaluate need for skin moisturizer/barrier cream  - Collaborate with interdisciplinary team   - Patient/family teaching  - Consider wound care consult   10/3/2024 0309 by Miki Pires, RN  Outcome: Progressing  10/3/2024 0305 by Miki Pires, RN  Outcome: Progressing     Problem: Alteration in Thoughts and Perception  Goal: Verbalize thoughts and feelings  Description: Interventions:  - Promote a nonjudgmental and trusting relationship with the patient through active listening and therapeutic communication  - Assess patient's level of functioning, behavior and potential for risk  - Engage patient in 1 on 1 interactions  - Encourage patient to express fears, feelings, frustrations, and discuss symptoms    - [place] patient to reality, help patient recognize reality-based thinking   - Administer medications as ordered and assess for potential side effects  - Provide the patient education related to the signs and symptoms of the illness and desired effects of prescribed medications  Outcome: Progressing  Goal: Agree to be compliant with medication regime, as prescribed and report medication side effects  Description: Interventions:  - Offer appropriate PRN medication and supervise ingestion; conduct AIMS, as needed   Outcome: Progressing  Goal: Attend and participate in unit activities, including therapeutic, recreational, and educational groups  Description:  Interventions:  -Encourage Visitation and family involvement in care  Outcome: Progressing  Goal: Recognize dysfunctional thoughts, communicate reality-based thoughts at the time of discharge  Description: Interventions:  - Provide medication and psycho-education to assist patient in compliance and developing insight into his/her illness   Outcome: Progressing  Goal: Complete daily ADLs, including personal hygiene independently, as able  Description: Interventions:  - Observe, teach, and assist patient with ADLS  - Monitor and promote a balance of rest/activity, with adequate nutrition and elimination   Outcome: Progressing     Problem: Ineffective Coping  Goal: Demonstrates healthy coping skills  Outcome: Progressing

## 2024-10-03 NOTE — NURSING NOTE
"Last documented BM 9/29. Adamantly refusing PRN suppository. Irritable and dismissive stating \"Go away. Get out.\"  "

## 2024-10-03 NOTE — PROGRESS NOTES
Pt attended group.  Pt dressed in hospital gown.  Pt not able to focus on group discussion and needed prompts.  Trying to speak when other peer was speaking.  Pt did move chair to be closer to group.  Pt in afternoon focused on making Halloween bags and decorating for Halloween.     10/03/24 1000   Activity/Group Checklist   Group Community meeting   Attendance Attended   Attendance Duration (min) 31-45   Interactions Other (Comment)  (needed redirection)   Affect/Mood Constricted   Goals Achieved Identified feelings;Identified triggers;Identified relapse prevention strategies;Increased hopefulness;Discussed coping strategies;Discussed self-esteem issues;Able to listen to others;Able to engage in interactions;Able to self-disclose;Able to recieve feedback

## 2024-10-03 NOTE — NURSING NOTE
Patient is bizarre, paranoid, and internally preoccupied. Visible on milieu. Poor boundaries w/ peers. Intrusive w/ male peer and irritable when redirected. Appetite is adequate. Disheveled appearance. Suspicious of medications but accepting. Asking RN various times if there are drugs in her coffee and juice. Appears anxious but denies psychiatric symptoms. Fall precautions in place. Continuous rounding maintained.

## 2024-10-03 NOTE — NURSING NOTE
New order for PRN bisacodyl 10mg. Patient hesitant to take but took w/ encouragement. Administered at 1836. Results pending.

## 2024-10-04 PROCEDURE — 99232 SBSQ HOSP IP/OBS MODERATE 35: CPT | Performed by: STUDENT IN AN ORGANIZED HEALTH CARE EDUCATION/TRAINING PROGRAM

## 2024-10-04 RX ORDER — POLYETHYLENE GLYCOL 3350 17 G/17G
17 POWDER, FOR SOLUTION ORAL DAILY PRN
Status: DISCONTINUED | OUTPATIENT
Start: 2024-10-04 | End: 2024-12-30 | Stop reason: HOSPADM

## 2024-10-04 RX ADMIN — FLUTICASONE FUROATE 1 PUFF: 100 POWDER RESPIRATORY (INHALATION) at 08:19

## 2024-10-04 RX ADMIN — CYANOCOBALAMIN TAB 1000 MCG 1000 MCG: 1000 TAB at 08:16

## 2024-10-04 RX ADMIN — MELATONIN TAB 3 MG 3 MG: 3 TAB at 21:31

## 2024-10-04 RX ADMIN — RISPERIDONE 1 MG: 1 TABLET, FILM COATED ORAL at 21:31

## 2024-10-04 RX ADMIN — ATORVASTATIN CALCIUM 10 MG: 10 TABLET, FILM COATED ORAL at 08:16

## 2024-10-04 RX ADMIN — LORAZEPAM 0.5 MG: 0.5 TABLET ORAL at 08:16

## 2024-10-04 RX ADMIN — CARVEDILOL 6.25 MG: 6.25 TABLET, FILM COATED ORAL at 08:16

## 2024-10-04 RX ADMIN — CARVEDILOL 6.25 MG: 6.25 TABLET, FILM COATED ORAL at 17:04

## 2024-10-04 RX ADMIN — TRAZODONE HYDROCHLORIDE 50 MG: 50 TABLET ORAL at 21:31

## 2024-10-04 RX ADMIN — PRAZOSIN HYDROCHLORIDE 2 MG: 1 CAPSULE ORAL at 21:31

## 2024-10-04 RX ADMIN — FAMOTIDINE 20 MG: 20 TABLET ORAL at 17:04

## 2024-10-04 RX ADMIN — FAMOTIDINE 20 MG: 20 TABLET ORAL at 08:16

## 2024-10-04 RX ADMIN — NICOTINE 1 PATCH: 7 PATCH, EXTENDED RELEASE TRANSDERMAL at 08:21

## 2024-10-04 RX ADMIN — DIVALPROEX SODIUM 1000 MG: 500 TABLET, EXTENDED RELEASE ORAL at 21:31

## 2024-10-04 RX ADMIN — CLOZAPINE 700 MG: 200 TABLET ORAL at 21:31

## 2024-10-04 RX ADMIN — ACETAMINOPHEN 975 MG: 325 TABLET, FILM COATED ORAL at 12:51

## 2024-10-04 RX ADMIN — POLYETHYLENE GLYCOL 3350 17 G: 17 POWDER, FOR SOLUTION ORAL at 08:16

## 2024-10-04 RX ADMIN — LORAZEPAM 0.5 MG: 0.5 TABLET ORAL at 17:04

## 2024-10-04 RX ADMIN — Medication 15 ML: at 08:16

## 2024-10-04 RX ADMIN — SENNOSIDES AND DOCUSATE SODIUM 2 TABLET: 8.6; 5 TABLET ORAL at 21:31

## 2024-10-04 NOTE — TREATMENT TEAM
Pt attended 3 groups. Lacks insight and responding.      10/04/24 1000   Activity/Group Checklist   Group Community meeting   Attendance Attended   Attendance Duration (min) 31-45   Interactions Other (Comment)  (needed prompting)   Affect/Mood Constricted   Goals Achieved Identified feelings;Identified triggers;Able to engage in interactions

## 2024-10-04 NOTE — NURSING NOTE
"Patient visible in the milieu. She is social with her peers and compliant with night time medications. Patient is disorganized in conversations, when asked if she is experiencing any s/s  she said \" the soap bathroom needs to be refilled and I need 3 tooth brushes because my roommate took mine\". Patient needs constant redirecting.  "

## 2024-10-04 NOTE — NURSING NOTE
"Patient is visible on the unit and social with peers. She has a disorganized thought process. Her speech is rapid and pressured. Patient is preoccupied with her appearance. She reports not disclosing her last bowel movement because \"I want the laxatives. I want the doctors to give me stuff to clear me out so I can lose weight.\" She is encouraged to lose weight in a healthier way instead of attempting to use laxatives. Patient is medication compliant but is paranoid about her medications being \"street drugs.\" She endorses auditory hallucinations \"making fun of me.\" Encouraged to inform staff of any needs or concerns.    "

## 2024-10-04 NOTE — TREATMENT TEAM
10/04/24 1100   Activity/Group Checklist   Group Wellness  (ocean meditation.)   Attendance Attended   Attendance Duration (min) 31-45   Interactions Interacted appropriately  (pt. initially irritable during group set up yet calm self and was appropriate for the rest of the sesion.)   Affect/Mood Appropriate;Normal range   Goals Achieved Able to engage in interactions;Able to listen to others

## 2024-10-04 NOTE — PROGRESS NOTES
10/04/24 0743   Team Meeting   Meeting Type Daily Rounds   Initial Conference Date 10/04/24   Next Conference Date 10/07/24   Team Members Present   Team Members Present Physician;Nurse;;   Physician Team Member Dr Banuelos, JAIR Goldmna   Nursing Team Member Dona   Care Management Team Member Anum   Social Work Team Member Ayse   Patient/Family Present   Patient Present No   Patient's Family Present No     304 to be submitted today for a hearing on 10/15, refusing suppository's, last BM 9/29, intrusive with peers, staring at nurses station, Risperdal started, waiting on CRR.

## 2024-10-04 NOTE — PLAN OF CARE
Problem: Alteration in Thoughts and Perception  Goal: Verbalize thoughts and feelings  Description: Interventions:  - Promote a nonjudgmental and trusting relationship with the patient through active listening and therapeutic communication  - Assess patient's level of functioning, behavior and potential for risk  - Engage patient in 1 on 1 interactions  - Encourage patient to express fears, feelings, frustrations, and discuss symptoms    - [place] patient to reality, help patient recognize reality-based thinking   - Administer medications as ordered and assess for potential side effects  - Provide the patient education related to the signs and symptoms of the illness and desired effects of prescribed medications  Outcome: Progressing     Problem: Risk for Self Injury/Neglect  Goal: Attend and participate in unit activities, including therapeutic, recreational, and educational groups  Description: Interventions:  - Provide therapeutic and educational activities daily, encourage attendance and participation, and document same in the medical record  - Obtain collateral information, encourage visitation and family involvement in care   Outcome: Progressing     Problem: Depression  Goal: Refrain from isolation  Description: Interventions:  - Develop a trusting relationship   - Encourage socialization   Outcome: Progressing     Problem: Potential for Falls  Goal: Patient will remain free of falls  Description: INTERVENTIONS:  - Educate patient on patient safety including physical limitations  - Instruct patient to call for assistance with activity   - Consult OT/PT to assist with strengthening/mobility   - Keep Call bell within reach  - Keep bed low and locked with side rails adjusted as appropriate  - Keep care items and personal belongings within reach  - Initiate and maintain comfort rounds  - Offer Toileting every 2 Hours, in advance of need  - Initiate/Maintain bed and chair alarm  - Obtain necessary fall risk  management equipment: walker, wheelchair  - Apply yellow socks and bracelet for high fall risk patients  - Patient moved to room near nurses station  Outcome: Progressing

## 2024-10-04 NOTE — PROGRESS NOTES
Progress Note - Behavioral Health   Name: Meli Nowak 59 y.o. female I MRN: [id number]  Unit/Bed#: [facility] 651-01 I Date of Admission: 7/23/2024   Date of Service: 10/4/2024 I Hospital Day: 73     Assessment & Plan  Schizoaffective disorder, bipolar type (HCC)  Progress towards goals  Awaiting CRR placement  Prozac is being tapered off  VPA level obtained on 9/30/2024 at 26;no changes to be made  Two antipsychotics utilized secondary to multiple failures of monotherapy  Continue medications as prescribed  Clozaril 700 mg nightly for schizoaffective disorder, Clozaril level 9/23 was 647, last ANC on 9/30 was 5.65    Risperdal 1 mg qhs for ongoing psychosis   Ativan 0.5 mg twice daily for anxiety and paranoia  Melatonin 3 mg nightly for insomnia  Prazosin 2 mg nightly for PTSD associated nightmares   trazodone 50 mg nightly for insomnia    No associated orders from this encounter found during lookback period of 72 hours.        Plan   Progress Toward Goals:   Meli is progressing towards goals of inpatient psychiatric treatment by continued medication compliance and is attending therapeutic modalities on the milieu. However, the patient continues to require inpatient psychiatric hospitalization for continued medication management and titration to optimize symptom reduction, improve sleep hygiene, and demonstrate adequate self-care.    Recommended Treatment: Treatment plan and medication changes discussed and per the attending physician the plan is:    1.Continue with group therapy, milieu therapy and occupational therapy  2.Behavioral Health checks every 7 minutes  3.Continue frequent safety checks and vitals per unit protocol  4.Continue with SLIM medical management as indicated  5.Continue with current medication regimen  6.Will review labs in the a.m.  7.Disposition Planning: Discharge planning and efforts remain ongoing    Behavioral Health Medications: all current active meds have been reviewed and continue  current psychiatric medications.  Current Facility-Administered Medications   Medication Dose Route Frequency Provider Last Rate    acetaminophen  650 mg Oral Q4H PRN Marie Ziegler, CRNP      acetaminophen  650 mg Oral Q4H PRN Marie Ziegler, CRNP      acetaminophen  975 mg Oral Q6H PRN Marie Ziegler, CRNP      aluminum-magnesium hydroxide-simethicone  30 mL Oral Q4H PRN Parris Bolanos, CRNP      Artificial Tears  1 drop Both Eyes Q6H PRN Dereje Banuelos MD      atorvastatin  10 mg Oral Daily Marie Ziegler, CRNP      bisacodyl  10 mg Oral Daily PRN Kristen Logan, CRNP      bisacodyl  10 mg Rectal Daily PRN Marie Ziegler, CRNP      carvedilol  6.25 mg Oral BID With Meals Marie Ziegler, CRNP      cloZAPine  700 mg Oral HS Marie Ziegler, CRNP      cyanocobalamin  1,000 mcg Oral Daily Marie Ziegler, CRNP      divalproex sodium  1,000 mg Oral HS Marie Ziegler, CRNP      famotidine  20 mg Oral BID Shan Fitzgerald DO      fluticasone  1 puff Inhalation Daily Marie Ziegler, CRNP      hydrOXYzine HCL  25 mg Oral Q6H PRN Max 4/day Marie Ziegler, CRNP      hydrOXYzine HCL  50 mg Oral Q6H PRN Max 4/day Marie Ziegler, CRNP      ipratropium-albuterol  3 mL Nebulization Q4H PRN Darin Lawton MD      LORazepam  0.5 mg Oral BID Marie Ziegler, CRNP      Or    LORazepam  0.5 mg Intramuscular BID Marie Ziegler, CRNP      LORazepam  1 mg Intramuscular Q6H PRN Max 3/day Marie Ziegler, CRNP      LORazepam  1 mg Oral Q6H PRN Max 3/day Marie Ziegler, CRNP      melatonin  3 mg Oral HS Marie Ziegler, CRNP      mirtazapine  7.5 mg Oral HS PRN Marie Ziegler, CRNP      Multivitamin  15 mL Oral Daily Marie Ziegler, CRNP      nicotine  1 patch Transdermal Daily Marie Ziegler, CRNP      OLANZapine  2.5 mg Oral Q6H PRN Dereje Banuelos MD      Or    OLANZapine  2.5 mg Intramuscular Q6H PRN Dereje Banuelos MD      OLANZapine  5 mg Oral Q6H PRN Dereje Banuelos MD      Or    OLANZapine  5 mg  "Intramuscular Q6H PRN Dereje Banuelos MD      ondansetron  4 mg Oral Q6H PRN Darin Lawton MD      polyethylene glycol  17 g Oral Daily JOSE ELIAS Ortega      polyethylene glycol  17 g Oral Daily PRN JOSE ELIAS Figueroa      prazosin  2 mg Oral HS JOSE ELIAS Figueroa      risperiDONE  1 mg Oral HS JOSE ELIAS Figueroa      senna-docusate sodium  2 tablet Oral HS Rehana Borrego PA-C      traZODone  50 mg Oral HS JOSE ELIAS Figueroa         Risks / Benefits of Treatment:  Risks, benefits, and possible side effects of medications explained to patient and patient verbalizes understanding and agreement for treatment.       Subjective    Patient was seen today for continuation of care, records reviewed and patient was discussed with the morning case review team.    Meli was seen today for psychiatric follow-up.  On assessment today, Meli was cooperative but suspicious. Stating \" I feel like I'm going to die\". Encouraged that staff is here to address any issues, patient reports no somatic or psychiatric symptoms. Paranoia, Congregation/somatic preoccupation and AH still noted, Clozaril 700 mg daily moderately effective.  Risperdal 1 mg nightly also added into regimen to assists, patient requires 2 antipsychotics secondary to multiple failures of monotherapy.  Weekly labs to be obtained on Monday, we will also obtain Clozaril level for possible titration.  No other medication changes anticipated at this time, tentative discharge pending CRR placement.    Meli denies acute suicidal/self-harm ideation/intent/plan upon direct inquiry at this time.  Meli remains behaviorally appropriate, no agitation or aggression noted on exam or in report.  Impulse control is intact.  Meli remains adherent to her current psychotropic medication regimen and denies any side effects from medications, as well as none noted on exam.    Psychiatric Review of Systems:  Behavior over the last 24 hours:  unchanged.   Sleep: " good  Appetite: good  Medication side effects: none  ROS: no complaints, denies shortness of breath or chest pain and all other systems are negative for acute changes        Objective    Vitals:  Vitals:    10/04/24 0747   BP: 134/72   Pulse: 99   Resp: 18   Temp: 97.8 °F (36.6 °C)   SpO2: 94%       Laboratory Results:  I have personally reviewed all pertinent laboratory/tests results.    Mental Status Evaluation:  Appearance:  disheveled, marginal hygiene   Behavior:  demanding, guarded   Speech:  normal rate and volume   Mood:  less anxious, less depressed   Affect:  constricted   Thought Process:  illogical, perseverative, concrete   Thought Content:  [employer] and somatic preoccupation, paranoid ideation   Perceptual Disturbances: appears preoccupied, talks to self at times   Risk Potential: Suicidal ideation - None  Homicidal ideation - None  Potential for aggression - No   Memory:  recent and remote memory grossly intact   Sensorium  person, place, and time/date      Consciousness:  alert and awake   Attention: attention span and concentration are age appropriate   Insight:  impaired due to psychosis   Judgment: impaired due to psychosis   Gait/Station: normal gait/station   Motor Activity: no abnormal movements       Counseling / Coordination of Care:  Patient's progress reviewed with nursing staff.  Medications, treatment progress and treatment plan reviewed with patient.  Supportive counseling provided to the patient.    This note was completed in part utilizing Dragon dictation Software. Grammatical, translation, syntax errors, random word insertions, spelling mistakes, and incomplete sentences may be an occasional consequence of this system secondary to software limitations with voice recognition, ambient noise, and hardware issues. If you have any questions or concerns about the content, text, or information contained within the body of this dictation, please contact the provider for clarification

## 2024-10-05 PROCEDURE — 99232 SBSQ HOSP IP/OBS MODERATE 35: CPT | Performed by: NURSE PRACTITIONER

## 2024-10-05 RX ADMIN — CARVEDILOL 6.25 MG: 6.25 TABLET, FILM COATED ORAL at 08:25

## 2024-10-05 RX ADMIN — CYANOCOBALAMIN TAB 1000 MCG 1000 MCG: 1000 TAB at 08:25

## 2024-10-05 RX ADMIN — DIVALPROEX SODIUM 1000 MG: 500 TABLET, EXTENDED RELEASE ORAL at 21:09

## 2024-10-05 RX ADMIN — Medication 15 ML: at 08:25

## 2024-10-05 RX ADMIN — POLYETHYLENE GLYCOL 3350 17 G: 17 POWDER, FOR SOLUTION ORAL at 08:25

## 2024-10-05 RX ADMIN — FAMOTIDINE 20 MG: 20 TABLET ORAL at 17:17

## 2024-10-05 RX ADMIN — ATORVASTATIN CALCIUM 10 MG: 10 TABLET, FILM COATED ORAL at 08:25

## 2024-10-05 RX ADMIN — RISPERIDONE 1 MG: 1 TABLET, FILM COATED ORAL at 21:09

## 2024-10-05 RX ADMIN — FLUTICASONE FUROATE 1 PUFF: 100 POWDER RESPIRATORY (INHALATION) at 08:27

## 2024-10-05 RX ADMIN — NICOTINE 1 PATCH: 7 PATCH, EXTENDED RELEASE TRANSDERMAL at 08:25

## 2024-10-05 RX ADMIN — FAMOTIDINE 20 MG: 20 TABLET ORAL at 08:25

## 2024-10-05 RX ADMIN — LORAZEPAM 0.5 MG: 0.5 TABLET ORAL at 08:25

## 2024-10-05 RX ADMIN — CARVEDILOL 6.25 MG: 6.25 TABLET, FILM COATED ORAL at 17:16

## 2024-10-05 RX ADMIN — MELATONIN TAB 3 MG 3 MG: 3 TAB at 21:08

## 2024-10-05 RX ADMIN — TRAZODONE HYDROCHLORIDE 50 MG: 50 TABLET ORAL at 21:08

## 2024-10-05 RX ADMIN — SENNOSIDES AND DOCUSATE SODIUM 2 TABLET: 8.6; 5 TABLET ORAL at 21:08

## 2024-10-05 RX ADMIN — PRAZOSIN HYDROCHLORIDE 2 MG: 1 CAPSULE ORAL at 21:08

## 2024-10-05 RX ADMIN — CLOZAPINE 700 MG: 200 TABLET ORAL at 21:09

## 2024-10-05 RX ADMIN — LORAZEPAM 0.5 MG: 0.5 TABLET ORAL at 17:16

## 2024-10-05 NOTE — PLAN OF CARE
Problem: DISCHARGE PLANNING - CARE MANAGEMENT  Goal: Discharge to post-acute care or home with appropriate resources  Description: INTERVENTIONS:  - Conduct assessment to determine patient/family and health care team treatment goals, and need for post-acute services based on payer coverage, community resources, and patient preferences, and barriers to discharge  - Address psychosocial, clinical, and financial barriers to discharge as identified in assessment in conjunction with the patient/family and health care team  - Arrange appropriate level of post-acute services according to patient’s   needs and preference and payer coverage in collaboration with the physician and health care team  - Communicate with and update the patient/family, physician, and health care team regarding progress on the discharge plan  - Arrange appropriate transportation to post-acute venues  Outcome: Progressing     Problem: Prexisting or High Potential for Compromised Skin Integrity  Goal: Skin integrity is maintained or improved  Description: INTERVENTIONS:  - Identify patients at risk for skin breakdown  - Assess and monitor skin integrity  - Assess and monitor nutrition and hydration status  - Monitor labs   - Assess for incontinence   - Turn and reposition patient  - Assist with mobility/ambulation  - Relieve pressure over bony prominences  - Avoid friction and shearing  - Provide appropriate hygiene as needed including keeping skin clean and dry  - Evaluate need for skin moisturizer/barrier cream  - Collaborate with interdisciplinary team   - Patient/family teaching  - Consider wound care consult   Outcome: Progressing     Problem: Alteration in Thoughts and Perception  Goal: Treatment Goal: Gain control of psychotic behaviors/thinking, reduce/eliminate presenting symptoms and demonstrate improved reality functioning upon discharge  Outcome: Progressing  Goal: Verbalize thoughts and feelings  Description: Interventions:  - Promote  a nonjudgmental and trusting relationship with the patient through active listening and therapeutic communication  - Assess patient's level of functioning, behavior and potential for risk  - Engage patient in 1 on 1 interactions  - Encourage patient to express fears, feelings, frustrations, and discuss symptoms    - [place] patient to reality, help patient recognize reality-based thinking   - Administer medications as ordered and assess for potential side effects  - Provide the patient education related to the signs and symptoms of the illness and desired effects of prescribed medications  Outcome: Progressing  Goal: Refrain from acting on delusional thinking/internal stimuli  Description: Interventions:  - Monitor patient closely, per order   - Utilize least restrictive measures   - Set reasonable limits, give positive feedback for acceptable   - Administer medications as ordered and monitor of potential side effects  Outcome: Progressing  Goal: Agree to be compliant with medication regime, as prescribed and report medication side effects  Description: Interventions:  - Offer appropriate PRN medication and supervise ingestion; conduct AIMS, as needed   Outcome: Progressing  Goal: Recognize dysfunctional thoughts, communicate reality-based thoughts at the time of discharge  Description: Interventions:  - Provide medication and psycho-education to assist patient in compliance and developing insight into his/her illness   Outcome: Progressing  Goal: Complete daily ADLs, including personal hygiene independently, as able  Description: Interventions:  - Observe, teach, and assist patient with ADLS  - Monitor and promote a balance of rest/activity, with adequate nutrition and elimination   Outcome: Progressing     Problem: Ineffective Coping  Goal: Identifies ineffective coping skills  Outcome: Progressing  Goal: Demonstrates healthy coping skills  Outcome: Progressing  Goal: Patient/Family participate in treatment and DC  plans  Description: Interventions:  - Provide therapeutic environment  Outcome: Progressing  Goal: Patient/Family verbalizes awareness of resources  Outcome: Progressing  Goal: Understands least restrictive measures  Description: Interventions:  - Utilize least restrictive behavior  Outcome: Progressing  Goal: Free from restraint events  Description: - Utilize least restrictive measures   - Provide behavioral interventions   - Redirect inappropriate behaviors   Outcome: Progressing     Problem: Risk for Self Injury/Neglect  Goal: Treatment Goal: Remain safe during length of stay, learn and adopt new coping skills, and be free of self-injurious ideation, impulses and acts at the time of discharge  Outcome: Progressing  Goal: Verbalize thoughts and feelings  Description: Interventions:  - Assess and re-assess patient's lethality and potential for self-injury  - Engage patient in 1:1 interactions, daily, for a minimum of 15 minutes  - Encourage patient to express feelings, fears, frustrations, hopes  - Establish rapport/trust with patient   Outcome: Progressing  Goal: Refrain from harming self  Description: Interventions:  - Monitor patient closely, per order  - Develop a trusting relationship  - Supervise medication ingestion, monitor effects and side effects   Outcome: Progressing  Goal: Recognize maladaptive responses and adopt new coping mechanisms  Outcome: Progressing     Problem: Depression  Goal: Treatment Goal: Demonstrate behavioral control of depressive symptoms, verbalize feelings of improved mood/affect, and adopt new coping skills prior to discharge  Outcome: Progressing  Goal: Verbalize thoughts and feelings  Description: Interventions:  - Assess and re-assess patient's level of risk   - Engage patient in 1:1 interactions, daily, for a minimum of 15 minutes   - Encourage patient to express feelings, fears, frustrations, hopes   Outcome: Progressing  Goal: Refrain from isolation  Description:  Interventions:  - Develop a trusting relationship   - Encourage socialization   Outcome: Progressing  Goal: Refrain from self-neglect  Outcome: Progressing     Problem: SAFETY,RESTRAINT: NV/NON-SELF DESTRUCTIVE BEHAVIOR  Goal: Remains free of harm/injury (restraint for non violent/non self-detsructive behavior)  Description: INTERVENTIONS:  - Instruct patient/family regarding restraint use   - Assess and monitor physiologic and psychological status   - Provide interventions and comfort measures to meet assessed patient needs   - Identify and implement measures to help patient regain control  - Assess readiness for release of restraint   Outcome: Progressing  Goal: Returns to optimal restraint-free functioning  Description: INTERVENTIONS:  - Assess the patient's behavior and symptoms that indicate continued need for restraint  - Identify and implement measures to help patient regain control  - Assess readiness for release of restraint   Outcome: Progressing     Problem: Potential for Falls  Goal: Patient will remain free of falls  Description: INTERVENTIONS:  - Educate patient on patient safety including physical limitations  - Instruct patient to call for assistance with activity   - Consult OT/PT to assist with strengthening/mobility   - Keep Call bell within reach  - Keep bed low and locked with side rails adjusted as appropriate  - Keep care items and personal belongings within reach  - Initiate and maintain comfort rounds  - Offer Toileting every 2 Hours, in advance of need  - Initiate/Maintain bed and chair alarm  - Obtain necessary fall risk management equipment: walker, wheelchair  - Apply yellow socks and bracelet for high fall risk patients  - Patient moved to room near nurses station  Outcome: Progressing     Problem: Nutrition/Hydration-ADULT  Goal: Nutrient/Hydration intake appropriate for improving, restoring or maintaining nutritional needs  Description: Monitor and assess patient's nutrition/hydration  status for malnutrition. Collaborate with interdisciplinary team and initiate plan and interventions as ordered.  Monitor patient's weight and dietary intake as ordered or per policy. Utilize nutrition screening tool and intervene as necessary. Determine patient's food preferences and provide high-protein, high-caloric foods as appropriate.     INTERVENTIONS:  - Monitor oral intake, urinary output, labs, and treatment plans  - Assess nutrition and hydration status and recommend course of action  - Evaluate amount of meals eaten  - Assist patient with eating if necessary   - Allow adequate time for meals  - Recommend/ encourage appropriate diets, oral nutritional supplements, and vitamin/mineral supplements  - Order, calculate, and assess calorie counts as needed  - Recommend, monitor, and adjust tube feedings and TPN/PPN based on assessed needs  - Assess need for intravenous fluids  - Provide specific nutrition/hydration education as appropriate  - Include patient/family/caregiver in decisions related to nutrition  Outcome: Progressing

## 2024-10-05 NOTE — NURSING NOTE
Patient calm, cooperative, visible on millieu, and sociable with peers.  She asked about being discharged possibly this evening or tomorrow and was educated on discharge planning.  She reports concern that she has no clothing nor shoes to wear for discharge and discharge planning was re-explained and she accepted.  She denies depression, anxiety, HI/SI/AVH and is able to make needs known.  She takes medications as scheduled and is cooperative with mouth checks.  She reports sleeping well.  Safety plan reviewed and patient has no unmet needs at this time.

## 2024-10-05 NOTE — PROGRESS NOTES
"  Progress Note - Behavioral Health   Name: Meli Nowak 59 y.o. female I MRN: [id number]  Unit/Bed#: [facility] 651-02 I Date of Admission: 7/23/2024   Date of Service: 10/5/2024 I Hospital Day: 74     Assessment & Plan  Schizoaffective disorder, bipolar type (HCC)  Progress towards goals  Awaiting CRR placement  Prozac is being tapered off  VPA level obtained on 9/30/2024 at 26;no changes to be made  Two antipsychotics utilized secondary to multiple failures of monotherapy  Continue medications as prescribed  Clozaril 700 mg nightly for schizoaffective disorder, Clozaril level 9/23 was 647, last ANC on 9/30 was 5.65    Risperdal 1 mg qhs for ongoing psychosis   Ativan 0.5 mg twice daily for anxiety and paranoia  Melatonin 3 mg nightly for insomnia  Prazosin 2 mg nightly for PTSD associated nightmares   trazodone 50 mg nightly for insomnia    No associated orders from this encounter found during lookback period of 72 hours.      Progress Toward Goals: No change.  Still remains very disorganized in her thought process and tangential.    Recommended Treatment: Continue with group therapy, milieu therapy and occupational therapy.      Risks, benefits and possible side effects of Medications:   Patient does not verbalize understanding at this time and will require further explanation.      History of Present Illness   Behavior over the last 24 hours:  unchanged  Sleep: normal  Appetite: normal  Medication side effects: No  ROS: no complaints    Subjective: Patient was seen for continuing care and treatment.  Case was discussed just with the nursing staff.  At times focused on discharge and then states she would like to \"stay here forever \".  Still very disorganized in her thought process.  She is cooperative with care and treatment.  She is social with others.  No episodes of agitation or aggression.  Continue current meds and treatment.  Discharge planning and disposition is ongoing.    Objective   Mental Status " Evaluation:  Appearance:  age appropriate, casually dressed, and disheveled   Behavior:  Mostly cooperative.   Speech:  normal pitch and normal volume   Mood:  anxious and depressed   Affect:  constricted   Thought Process:  disorganized and illogical   Associations: loose associations   Thought Content:  Paranoid and religiously preoccupied at times   Perceptual Disturbances: Observed responding to internal stimuli   Risk Potential: Suicidal Ideations none  Homicidal Ideations none  Potential for Aggression No   Sensorium:  person, place, and time/date   Memory:  recent and remote memory grossly intact   Consciousness:  alert and awake    Attention: attention span appeared shorter than expected for age   Insight:  impaired due to psychosis   Judgment: impaired due to psychosis   Gait/Station: normal gait/station   Motor Activity: no abnormal movements     Medications: current meds:   Current Facility-Administered Medications:     acetaminophen (TYLENOL) tablet 650 mg, Q4H PRN    acetaminophen (TYLENOL) tablet 650 mg, Q4H PRN    acetaminophen (TYLENOL) tablet 975 mg, Q6H PRN    aluminum-magnesium hydroxide-simethicone (MAALOX) oral suspension 30 mL, Q4H PRN    Artificial Tears Op Soln 1 drop, Q6H PRN    atorvastatin (LIPITOR) tablet 10 mg, Daily    bisacodyl (DULCOLAX) EC tablet 10 mg, Daily PRN    bisacodyl (DULCOLAX) rectal suppository 10 mg, Daily PRN    carvedilol (COREG) tablet 6.25 mg, BID With Meals    cloZAPine (CLOZARIL) tablet 700 mg, HS    cyanocobalamin (VITAMIN B-12) tablet 1,000 mcg, Daily    divalproex sodium (DEPAKOTE ER) 24 hr tablet 1,000 mg, HS    famotidine (PEPCID) tablet 20 mg, BID    fluticasone (ARNUITY ELLIPTA) 100 MCG/ACT inhaler 1 puff, Daily    hydrOXYzine HCL (ATARAX) tablet 25 mg, Q6H PRN Max 4/day    hydrOXYzine HCL (ATARAX) tablet 50 mg, Q6H PRN Max 4/day    ipratropium-albuterol (DUO-NEB) 0.5-2.5 mg/3 mL inhalation solution 3 mL, Q4H PRN    LORazepam (ATIVAN) tablet 0.5 mg, BID  **OR** LORazepam (ATIVAN) injection 0.5 mg, BID    LORazepam (ATIVAN) injection 1 mg, Q6H PRN Max 3/day    LORazepam (ATIVAN) tablet 1 mg, Q6H PRN Max 3/day    melatonin tablet 3 mg, HS    mirtazapine (REMERON) tablet 7.5 mg, HS PRN    Multivitamin liquid 15 mL, Daily    nicotine (NICODERM CQ) 7 mg/24hr TD 24 hr patch 1 patch, Daily    OLANZapine (ZyPREXA) tablet 2.5 mg, Q6H PRN **OR** OLANZapine (ZyPREXA) IM injection 2.5 mg, Q6H PRN    OLANZapine (ZyPREXA) tablet 5 mg, Q6H PRN **OR** OLANZapine (ZyPREXA) IM injection 5 mg, Q6H PRN    ondansetron (ZOFRAN-ODT) dispersible tablet 4 mg, Q6H PRN    polyethylene glycol (MIRALAX) packet 17 g, Daily    polyethylene glycol (MIRALAX) packet 17 g, Daily PRN    prazosin (MINIPRESS) capsule 2 mg, HS    risperiDONE (RisperDAL) tablet 1 mg, HS    senna-docusate sodium (SENOKOT S) 8.6-50 mg per tablet 2 tablet, HS    traZODone (DESYREL) tablet 50 mg, HS.      Lab Results: I have reviewed the following results:  Most Recent Labs:   Lab Results   Component Value Date    WBC 10.00 09/30/2024    RBC 4.56 09/30/2024    HGB 14.0 09/30/2024    HCT 43.7 09/30/2024     09/30/2024    RDW 14.9 09/30/2024    NEUTROABS 5.65 09/30/2024    SODIUM 138 09/30/2024    K 4.2 09/30/2024     09/30/2024    CO2 30 09/30/2024    BUN 16 09/30/2024    CREATININE 1.13 09/30/2024    GLUC 128 09/30/2024    GLUF 98 08/10/2024    CALCIUM 9.3 09/30/2024    AST 15 09/30/2024    ALT 17 09/30/2024    ALKPHOS 86 09/30/2024    TP 6.4 09/30/2024    ALB 3.7 09/30/2024    TBILI 0.28 09/30/2024    VALPROICTOT 26 (L) 09/30/2024    AMMONIA 32 07/03/2024    [id number] 2.000 07/24/2024    HGBA1C 6.4 (H) 05/03/2024     05/03/2024       Approximately 30 minutes were spent with this patient.  Time was spent reviewing the patient's record, receiving report from the nursing staff and completing the progress note.

## 2024-10-05 NOTE — NURSING NOTE
"Patient is visible on the unit and social with peers. She remains disorganized, tangential, and hyperverbal with pressured, rapid, and repetitive speech. Patient endorses anxiety. She requests to stay here \"forever\" and asks peers \"If I go to a group home will you go with me?\" She is medication compliant but remains suspicious of her medications. Encouraged to inform staff of any needs or concerns.    "

## 2024-10-06 PROCEDURE — 99232 SBSQ HOSP IP/OBS MODERATE 35: CPT | Performed by: NURSE PRACTITIONER

## 2024-10-06 RX ADMIN — CYANOCOBALAMIN TAB 1000 MCG 1000 MCG: 1000 TAB at 08:24

## 2024-10-06 RX ADMIN — ATORVASTATIN CALCIUM 10 MG: 10 TABLET, FILM COATED ORAL at 08:24

## 2024-10-06 RX ADMIN — CARVEDILOL 6.25 MG: 6.25 TABLET, FILM COATED ORAL at 08:24

## 2024-10-06 RX ADMIN — Medication 15 ML: at 08:24

## 2024-10-06 RX ADMIN — FLUTICASONE FUROATE 1 PUFF: 100 POWDER RESPIRATORY (INHALATION) at 08:28

## 2024-10-06 RX ADMIN — MELATONIN TAB 3 MG 3 MG: 3 TAB at 21:43

## 2024-10-06 RX ADMIN — FAMOTIDINE 20 MG: 20 TABLET ORAL at 08:24

## 2024-10-06 RX ADMIN — FAMOTIDINE 20 MG: 20 TABLET ORAL at 17:35

## 2024-10-06 RX ADMIN — LORAZEPAM 0.5 MG: 0.5 TABLET ORAL at 17:02

## 2024-10-06 RX ADMIN — SENNOSIDES AND DOCUSATE SODIUM 2 TABLET: 8.6; 5 TABLET ORAL at 21:43

## 2024-10-06 RX ADMIN — RISPERIDONE 1 MG: 1 TABLET, FILM COATED ORAL at 21:43

## 2024-10-06 RX ADMIN — TRAZODONE HYDROCHLORIDE 50 MG: 50 TABLET ORAL at 21:43

## 2024-10-06 RX ADMIN — CLOZAPINE 700 MG: 200 TABLET ORAL at 21:43

## 2024-10-06 RX ADMIN — LORAZEPAM 0.5 MG: 0.5 TABLET ORAL at 08:24

## 2024-10-06 RX ADMIN — CARVEDILOL 6.25 MG: 6.25 TABLET, FILM COATED ORAL at 17:02

## 2024-10-06 RX ADMIN — POLYETHYLENE GLYCOL 3350 17 G: 17 POWDER, FOR SOLUTION ORAL at 08:24

## 2024-10-06 RX ADMIN — DIVALPROEX SODIUM 1000 MG: 500 TABLET, EXTENDED RELEASE ORAL at 21:43

## 2024-10-06 RX ADMIN — PRAZOSIN HYDROCHLORIDE 2 MG: 1 CAPSULE ORAL at 21:43

## 2024-10-06 NOTE — NURSING NOTE
Pt offers no complaints, able to make needs known, mouth checks conducted after medication administration.

## 2024-10-06 NOTE — PROGRESS NOTES
Progress Note - Behavioral Health   Name: Meli Nowak 59 y.o. female I MRN: [id number]  Unit/Bed#: [facility] 651-02 I Date of Admission: 7/23/2024   Date of Service: 10/6/2024 I Hospital Day: 75     Assessment & Plan  Schizoaffective disorder, bipolar type (HCC)  Progress towards goals  Awaiting CRR placement  Prozac is being tapered off  VPA level obtained on 9/30/2024 at 26;no changes to be made  Two antipsychotics utilized secondary to multiple failures of monotherapy  Continue medications as prescribed  Clozaril 700 mg nightly for schizoaffective disorder, Clozaril level 9/23 was 647, last ANC on 9/30 was 5.65    Risperdal 1 mg qhs for ongoing psychosis   Ativan 0.5 mg twice daily for anxiety and paranoia  Melatonin 3 mg nightly for insomnia  Prazosin 2 mg nightly for PTSD associated nightmares   trazodone 50 mg nightly for insomnia    No associated orders from this encounter found during lookback period of 72 hours.      Progress Toward Goals: No change in mood or behaviors    Recommended Treatment: Continue with group therapy, milieu therapy and occupational therapy.    As per the treatment team    Risks, benefits and possible side effects of Medications:   Risks, benefits, and possible side effects of medications explained to patient and patient verbalizes understanding.      History of Present Illness   Behavior over the last 24 hours:  unchanged  Sleep: normal  Appetite: normal  Medication side effects: No  ROS: no complaints    Subjective: Patient was seen for continuing care and treatment.  Case was discussed with the nursing staff.  Cooperative with care and treatment today.  No change in symptoms or behaviors.  Continue current meds and treatment.  Discharge planning and disposition ongoing.    Objective   Mental Status Evaluation:  Appearance:  age appropriate, casually dressed, and disheveled   Behavior:  Cooperative   Speech:  normal pitch and normal volume   Mood:  anxious and depressed   Affect:   constricted   Thought Process:  disorganized and illogical   Associations: loose associations   Thought Content:  Paranoid and delusional   Perceptual Disturbances: Auditory hallucinations without commands   Risk Potential: Suicidal Ideations none  Homicidal Ideations none  Potential for Aggression No   Sensorium:  person, place, and time/date   Memory:  recent and remote memory grossly intact   Consciousness:  alert and awake    Attention: attention span appeared shorter than expected for age   Insight:  impaired due to psychosis   Judgment: impaired due to psychosis   Gait/Station: normal gait/station   Motor Activity: no abnormal movements     Medications: current meds:   Current Facility-Administered Medications:     acetaminophen (TYLENOL) tablet 650 mg, Q4H PRN    acetaminophen (TYLENOL) tablet 650 mg, Q4H PRN    acetaminophen (TYLENOL) tablet 975 mg, Q6H PRN    aluminum-magnesium hydroxide-simethicone (MAALOX) oral suspension 30 mL, Q4H PRN    Artificial Tears Op Soln 1 drop, Q6H PRN    atorvastatin (LIPITOR) tablet 10 mg, Daily    bisacodyl (DULCOLAX) EC tablet 10 mg, Daily PRN    bisacodyl (DULCOLAX) rectal suppository 10 mg, Daily PRN    carvedilol (COREG) tablet 6.25 mg, BID With Meals    cloZAPine (CLOZARIL) tablet 700 mg, HS    cyanocobalamin (VITAMIN B-12) tablet 1,000 mcg, Daily    divalproex sodium (DEPAKOTE ER) 24 hr tablet 1,000 mg, HS    famotidine (PEPCID) tablet 20 mg, BID    fluticasone (ARNUITY ELLIPTA) 100 MCG/ACT inhaler 1 puff, Daily    hydrOXYzine HCL (ATARAX) tablet 25 mg, Q6H PRN Max 4/day    hydrOXYzine HCL (ATARAX) tablet 50 mg, Q6H PRN Max 4/day    ipratropium-albuterol (DUO-NEB) 0.5-2.5 mg/3 mL inhalation solution 3 mL, Q4H PRN    LORazepam (ATIVAN) tablet 0.5 mg, BID **OR** LORazepam (ATIVAN) injection 0.5 mg, BID    LORazepam (ATIVAN) injection 1 mg, Q6H PRN Max 3/day    LORazepam (ATIVAN) tablet 1 mg, Q6H PRN Max 3/day    melatonin tablet 3 mg, HS    mirtazapine (REMERON) tablet  7.5 mg, HS PRN    Multivitamin liquid 15 mL, Daily    nicotine (NICODERM CQ) 7 mg/24hr TD 24 hr patch 1 patch, Daily    OLANZapine (ZyPREXA) tablet 2.5 mg, Q6H PRN **OR** OLANZapine (ZyPREXA) IM injection 2.5 mg, Q6H PRN    OLANZapine (ZyPREXA) tablet 5 mg, Q6H PRN **OR** OLANZapine (ZyPREXA) IM injection 5 mg, Q6H PRN    ondansetron (ZOFRAN-ODT) dispersible tablet 4 mg, Q6H PRN    polyethylene glycol (MIRALAX) packet 17 g, Daily    polyethylene glycol (MIRALAX) packet 17 g, Daily PRN    prazosin (MINIPRESS) capsule 2 mg, HS    risperiDONE (RisperDAL) tablet 1 mg, HS    senna-docusate sodium (SENOKOT S) 8.6-50 mg per tablet 2 tablet, HS    traZODone (DESYREL) tablet 50 mg, HS.      Lab Results: I have reviewed the following results:  Most Recent Labs:   Lab Results   Component Value Date    WBC 10.00 09/30/2024    RBC 4.56 09/30/2024    HGB 14.0 09/30/2024    HCT 43.7 09/30/2024     09/30/2024    RDW 14.9 09/30/2024    NEUTROABS 5.65 09/30/2024    SODIUM 138 09/30/2024    K 4.2 09/30/2024     09/30/2024    CO2 30 09/30/2024    BUN 16 09/30/2024    CREATININE 1.13 09/30/2024    GLUC 128 09/30/2024    GLUF 98 08/10/2024    CALCIUM 9.3 09/30/2024    AST 15 09/30/2024    ALT 17 09/30/2024    ALKPHOS 86 09/30/2024    TP 6.4 09/30/2024    ALB 3.7 09/30/2024    TBILI 0.28 09/30/2024    VALPROICTOT 26 (L) 09/30/2024    AMMONIA 32 07/03/2024    [id number] 2.000 07/24/2024    HGBA1C 6.4 (H) 05/03/2024     05/03/2024       I spent approximately 30 minutes in the care and treatment of this patient.  Time spent reviewing the record, receiving report from the nursing staff , medication management and completing the progress note

## 2024-10-06 NOTE — NURSING NOTE
"Patient is withdrawn to room this shift. She was incontinent of urine this morning. When asked about her incontinence she states \"This is just who I am now.\" She denies all psych s/s when asked but is internally preoccupied. Patient questioned medications prior to administration, asking if they were \"street drugs.\" She was medication compliant. Encouraged to inform staff of any needs or concerns.    "

## 2024-10-06 NOTE — PLAN OF CARE
Problem: Alteration in Thoughts and Perception  Goal: Treatment Goal: Gain control of psychotic behaviors/thinking, reduce/eliminate presenting symptoms and demonstrate improved reality functioning upon discharge  Outcome: Progressing  Goal: Verbalize thoughts and feelings  Description: Interventions:  - Promote a nonjudgmental and trusting relationship with the patient through active listening and therapeutic communication  - Assess patient's level of functioning, behavior and potential for risk  - Engage patient in 1 on 1 interactions  - Encourage patient to express fears, feelings, frustrations, and discuss symptoms    - [place] patient to reality, help patient recognize reality-based thinking   - Administer medications as ordered and assess for potential side effects  - Provide the patient education related to the signs and symptoms of the illness and desired effects of prescribed medications  Outcome: Progressing  Goal: Refrain from acting on delusional thinking/internal stimuli  Description: Interventions:  - Monitor patient closely, per order   - Utilize least restrictive measures   - Set reasonable limits, give positive feedback for acceptable   - Administer medications as ordered and monitor of potential side effects  Outcome: Progressing  Goal: Agree to be compliant with medication regime, as prescribed and report medication side effects  Description: Interventions:  - Offer appropriate PRN medication and supervise ingestion; conduct AIMS, as needed   Outcome: Progressing  Goal: Attend and participate in unit activities, including therapeutic, recreational, and educational groups  Description: Interventions:  -Encourage Visitation and family involvement in care  Outcome: Progressing  Goal: Recognize dysfunctional thoughts, communicate reality-based thoughts at the time of discharge  Description: Interventions:  - Provide medication and psycho-education to assist patient in compliance and developing  insight into his/her illness   Outcome: Progressing  Goal: Complete daily ADLs, including personal hygiene independently, as able  Description: Interventions:  - Observe, teach, and assist patient with ADLS  - Monitor and promote a balance of rest/activity, with adequate nutrition and elimination   Outcome: Progressing     Problem: Depression  Goal: Treatment Goal: Demonstrate behavioral control of depressive symptoms, verbalize feelings of improved mood/affect, and adopt new coping skills prior to discharge  Outcome: Progressing  Goal: Verbalize thoughts and feelings  Description: Interventions:  - Assess and re-assess patient's level of risk   - Engage patient in 1:1 interactions, daily, for a minimum of 15 minutes   - Encourage patient to express feelings, fears, frustrations, hopes   Outcome: Progressing  Goal: Refrain from isolation  Description: Interventions:  - Develop a trusting relationship   - Encourage socialization   Outcome: Progressing  Goal: Refrain from self-neglect  Outcome: Progressing     Problem: Anxiety  Goal: Anxiety is at manageable level  Description: Interventions:  - Assess and monitor patient's anxiety level.   - Monitor for signs and symptoms (heart palpitations, chest pain, shortness of breath, headaches, nausea, feeling jumpy, restlessness, irritable, apprehensive).   - Collaborate with interdisciplinary team and initiate plan and interventions as ordered.  - [place] patient to unit/surroundings  - Explain treatment plan  - Encourage participation in care  - Encourage verbalization of concerns/fears  - Identify coping mechanisms  - Assist in developing anxiety-reducing skills  - Administer/offer alternative therapies  - Limit or eliminate stimulants  Outcome: Progressing

## 2024-10-07 LAB
BASOPHILS # BLD AUTO: 0.07 THOUSANDS/ΜL (ref 0–0.1)
BASOPHILS NFR BLD AUTO: 1 % (ref 0–1)
CK SERPL-CCNC: 36 U/L (ref 26–192)
CLOZAPINE SERPL-MCNC: 711 NG/ML (ref 350–900)
CRP SERPL QL: 2.5 MG/L
EOSINOPHIL # BLD AUTO: 0 THOUSAND/ΜL (ref 0–0.61)
EOSINOPHIL NFR BLD AUTO: 0 % (ref 0–6)
ERYTHROCYTE [DISTWIDTH] IN BLOOD BY AUTOMATED COUNT: 14.7 % (ref 11.6–15.1)
HCT VFR BLD AUTO: 37.8 % (ref 34.8–46.1)
HGB BLD-MCNC: 12.9 G/DL (ref 11.5–15.4)
IMM GRANULOCYTES # BLD AUTO: 0.04 THOUSAND/UL (ref 0–0.2)
IMM GRANULOCYTES NFR BLD AUTO: 0 % (ref 0–2)
LYMPHOCYTES # BLD AUTO: 3.76 THOUSANDS/ΜL (ref 0.6–4.47)
LYMPHOCYTES NFR BLD AUTO: 37 % (ref 14–44)
MCH RBC QN AUTO: 31.2 PG (ref 26.8–34.3)
MCHC RBC AUTO-ENTMCNC: 34.1 G/DL (ref 31.4–37.4)
MCV RBC AUTO: 92 FL (ref 82–98)
MONOCYTES # BLD AUTO: 1.27 THOUSAND/ΜL (ref 0.17–1.22)
MONOCYTES NFR BLD AUTO: 12 % (ref 4–12)
NEUTROPHILS # BLD AUTO: 5.1 THOUSANDS/ΜL (ref 1.85–7.62)
NEUTS SEG NFR BLD AUTO: 50 % (ref 43–75)
NRBC BLD AUTO-RTO: 0 /100 WBCS
PLATELET # BLD AUTO: 250 THOUSANDS/UL (ref 149–390)
PMV BLD AUTO: 9.7 FL (ref 8.9–12.7)
RBC # BLD AUTO: 4.13 MILLION/UL (ref 3.81–5.12)
WBC # BLD AUTO: 10.24 THOUSAND/UL (ref 4.31–10.16)

## 2024-10-07 PROCEDURE — 85025 COMPLETE CBC W/AUTO DIFF WBC: CPT

## 2024-10-07 PROCEDURE — 86140 C-REACTIVE PROTEIN: CPT

## 2024-10-07 PROCEDURE — 80159 DRUG ASSAY CLOZAPINE: CPT

## 2024-10-07 PROCEDURE — 99232 SBSQ HOSP IP/OBS MODERATE 35: CPT | Performed by: STUDENT IN AN ORGANIZED HEALTH CARE EDUCATION/TRAINING PROGRAM

## 2024-10-07 PROCEDURE — 82550 ASSAY OF CK (CPK): CPT

## 2024-10-07 RX ORDER — RISPERIDONE 2 MG/1
2 TABLET ORAL
Status: DISCONTINUED | OUTPATIENT
Start: 2024-10-07 | End: 2024-10-22

## 2024-10-07 RX ADMIN — PRAZOSIN HYDROCHLORIDE 2 MG: 1 CAPSULE ORAL at 21:14

## 2024-10-07 RX ADMIN — MELATONIN TAB 3 MG 3 MG: 3 TAB at 21:14

## 2024-10-07 RX ADMIN — CARVEDILOL 6.25 MG: 6.25 TABLET, FILM COATED ORAL at 17:05

## 2024-10-07 RX ADMIN — CLOZAPINE 700 MG: 200 TABLET ORAL at 21:14

## 2024-10-07 RX ADMIN — FAMOTIDINE 20 MG: 20 TABLET ORAL at 08:44

## 2024-10-07 RX ADMIN — DIVALPROEX SODIUM 1000 MG: 500 TABLET, EXTENDED RELEASE ORAL at 21:14

## 2024-10-07 RX ADMIN — NICOTINE 1 PATCH: 7 PATCH, EXTENDED RELEASE TRANSDERMAL at 08:44

## 2024-10-07 RX ADMIN — RISPERIDONE 2 MG: 2 TABLET, FILM COATED ORAL at 21:14

## 2024-10-07 RX ADMIN — FAMOTIDINE 20 MG: 20 TABLET ORAL at 17:05

## 2024-10-07 RX ADMIN — SENNOSIDES AND DOCUSATE SODIUM 2 TABLET: 8.6; 5 TABLET ORAL at 21:14

## 2024-10-07 RX ADMIN — ATORVASTATIN CALCIUM 10 MG: 10 TABLET, FILM COATED ORAL at 08:45

## 2024-10-07 RX ADMIN — CYANOCOBALAMIN TAB 1000 MCG 1000 MCG: 1000 TAB at 08:44

## 2024-10-07 RX ADMIN — LORAZEPAM 0.5 MG: 0.5 TABLET ORAL at 17:05

## 2024-10-07 RX ADMIN — TRAZODONE HYDROCHLORIDE 50 MG: 50 TABLET ORAL at 21:14

## 2024-10-07 RX ADMIN — Medication 15 ML: at 08:44

## 2024-10-07 RX ADMIN — FLUTICASONE FUROATE 1 PUFF: 100 POWDER RESPIRATORY (INHALATION) at 08:45

## 2024-10-07 RX ADMIN — LORAZEPAM 0.5 MG: 0.5 TABLET ORAL at 08:44

## 2024-10-07 RX ADMIN — CARVEDILOL 6.25 MG: 6.25 TABLET, FILM COATED ORAL at 08:46

## 2024-10-07 RX ADMIN — POLYETHYLENE GLYCOL 3350 17 G: 17 POWDER, FOR SOLUTION ORAL at 08:44

## 2024-10-07 NOTE — PROGRESS NOTES
Pt attended 2 groups.  Pt disorganized and needs redirection.  Pt lacks insight and preoccupied with music. Pt needs limit setting with peer (brushing hair).      10/07/24 1330   Activity/Group Checklist   Group Other (Comment)  (Community support and resources)   Attendance Attended   Attendance Duration (min) 46-60   Interactions Other (Comment)  (needed redirection)   Affect/Mood Wide   Goals Achieved Identified feelings;Identified triggers;Identified relapse prevention strategies;Able to listen to others;Discussed coping strategies;Discussed self-esteem issues

## 2024-10-07 NOTE — NURSING NOTE
Patient visible in the unit. She is disorganized in conversations. Patient is cooperative and compliant with medications. Patient denies all s/s.

## 2024-10-07 NOTE — ASSESSMENT & PLAN NOTE
Progress towards goals  Awaiting CRR placement  Prozac is being tapered off  VPA level obtained on 9/30/2024 at 26;no changes to be made  Two antipsychotics utilized secondary to multiple failures of monotherapy  Continue medications as prescribed  Clozaril 700 mg nightly for schizoaffective disorder, Clozaril level 9/23 was 647, last ANC on 10/7 was 5.10    Increase Risperdal to 2 mg qhs for ongoing psychosis   Ativan 0.5 mg twice daily for anxiety and paranoia  Melatonin 3 mg nightly for insomnia  Prazosin 2 mg nightly for PTSD associated nightmares   trazodone 50 mg nightly for insomnia    No associated orders from this encounter found during lookback period of 72 hours.

## 2024-10-07 NOTE — PROGRESS NOTES
Progress Note - Behavioral Health   Name: Meli Nowak 59 y.o. female I MRN: [id number]  Unit/Bed#: [facility] 651-02 I Date of Admission: 7/23/2024   Date of Service: 10/7/2024 I Hospital Day: 76     Assessment & Plan  Schizoaffective disorder, bipolar type (HCC)  Progress towards goals  Awaiting CRR placement  Prozac is being tapered off  VPA level obtained on 9/30/2024 at 26;no changes to be made  Two antipsychotics utilized secondary to multiple failures of monotherapy  Continue medications as prescribed  Clozaril 700 mg nightly for schizoaffective disorder, Clozaril level 9/23 was 647, last ANC on 10/7 was 5.10    Increase Risperdal to 2 mg qhs for ongoing psychosis   Ativan 0.5 mg twice daily for anxiety and paranoia  Melatonin 3 mg nightly for insomnia  Prazosin 2 mg nightly for PTSD associated nightmares   trazodone 50 mg nightly for insomnia    No associated orders from this encounter found during lookback period of 72 hours.        Plan   Progress Toward Goals:   Meli is progressing towards goals of inpatient psychiatric treatment by continued medication compliance and is attending therapeutic modalities on the milieu. However, the patient continues to require inpatient psychiatric hospitalization for continued medication management and titration to optimize symptom reduction, improve sleep hygiene, and demonstrate adequate self-care.    Recommended Treatment: Treatment plan and medication changes discussed and per the attending physician the plan is:    1.Continue with group therapy, milieu therapy and occupational therapy  2.Behavioral Health checks every 7 minutes  3.Continue frequent safety checks and vitals per unit protocol  4.Continue with SLIM medical management as indicated  5.Continue with current medication regimen  6.Will review labs in the a.m.  7.Disposition Planning: Discharge planning and efforts remain ongoing    Behavioral Health Medications: all current active meds have been reviewed and  continue current psychiatric medications.  Current Facility-Administered Medications   Medication Dose Route Frequency Provider Last Rate    acetaminophen  650 mg Oral Q4H PRN Marie Ziegler, CRNP      acetaminophen  650 mg Oral Q4H PRN Marie Ziegler, CRNP      acetaminophen  975 mg Oral Q6H PRN Marie Ziegler, CRNP      aluminum-magnesium hydroxide-simethicone  30 mL Oral Q4H PRN Parris Bolanos, CRNP      Artificial Tears  1 drop Both Eyes Q6H PRN Dereje Banuelos MD      atorvastatin  10 mg Oral Daily Marie Ziegler, CRNP      bisacodyl  10 mg Oral Daily PRN Kristen Logan, CRNP      bisacodyl  10 mg Rectal Daily PRN Marie Ziegler, CRNP      carvedilol  6.25 mg Oral BID With Meals Marie Ziegler, CRNP      cloZAPine  700 mg Oral HS Marie Ziegler, CRNP      cyanocobalamin  1,000 mcg Oral Daily Marie Ziegler, CRNP      divalproex sodium  1,000 mg Oral HS Marie Ziegler, CRNP      famotidine  20 mg Oral BID Shan Fitzgerald DO      fluticasone  1 puff Inhalation Daily Marie Ziegler, CRNP      hydrOXYzine HCL  25 mg Oral Q6H PRN Max 4/day Marie Ziegler, CRNP      hydrOXYzine HCL  50 mg Oral Q6H PRN Max 4/day Marie Ziegler, JOSE ELIAS      ipratropium-albuterol  3 mL Nebulization Q4H PRN Darin Lawton MD      LORazepam  0.5 mg Oral BID Marie Ziegler, CRNP      Or    LORazepam  0.5 mg Intramuscular BID Marie Ziegler, JOSE ELIAS      LORazepam  1 mg Intramuscular Q6H PRN Max 3/day Marie Ziegler, CRNP      LORazepam  1 mg Oral Q6H PRN Max 3/day Marie Ziegler, CHRISTOPHERNP      melatonin  3 mg Oral HS Marie Ziegler, CRNP      mirtazapine  7.5 mg Oral HS PRN Marie Ziegler, CHRISTOPHERNP      Multivitamin  15 mL Oral Daily Marie Ziegler, CRNP      nicotine  1 patch Transdermal Daily Marie Ziegler, CRNP      OLANZapine  2.5 mg Oral Q6H PRN Dereje Banuelos MD      Or    OLANZapine  2.5 mg Intramuscular Q6H PRN Dereje Banuelos MD      OLANZapine  5 mg Oral Q6H PRN Dereje Banuelos MD      Or    OLANZapine  5 mg  Intramuscular Q6H PRN Dereje Banuelos MD      ondansetron  4 mg Oral Q6H PRN Darin Lawton MD      polyethylene glycol  17 g Oral Daily JOSE ELIAS Ortega      polyethylene glycol  17 g Oral Daily PRN JOSE ELIAS Figueroa      prazosin  2 mg Oral HS Marie Ziegler, JOSE ELIAS      risperiDONE  2 mg Oral HS JOSE ELIAS Figueroa      senna-docusate sodium  2 tablet Oral HS Rehana Borrego PA-C      traZODone  50 mg Oral HS JOSE ELIAS Figueroa         Risks / Benefits of Treatment:  Risks, benefits, and possible side effects of medications explained to patient and patient verbalizes understanding and agreement for treatment.       Subjective    Patient was seen today for continuation of care, records reviewed and patient was discussed with the morning case review team.    Meli was seen today for psychiatric follow-up.  On assessment today, Meli was superficially cooperative. Denying all symptoms, patient appears to be internally preoccupied but denies AH. [employer] occupation ongoing, patient is also noted to have intentional incontinence over the weekend and required redirection from several staff members. Risperdal to be increased to 2 mg qhs for ongoing psychosis, Meli requires two antipsychotics secondary to multiple failures of monotherapy. No EPS or adverse symptoms reported or noted on exam. Clozaril also to continue at 700 mg qhs with level to be obtained this evening. Weekly labs unremarkable. Tentative discharge ongoing as patients awaits CRR placement.     Meli denies acute suicidal/self-harm ideation/intent/plan upon direct inquiry at this time.  Meli remains behaviorally appropriate, no agitation or aggression noted on exam or in report.  Impulse control is intact.  Meli remains adherent to her current psychotropic medication regimen and denies any side effects from medications, as well as none noted on exam.    Psychiatric Review of Systems:  Behavior over the last 24 hours:  unchanged.    Sleep: good  Appetite: good  Medication side effects: none  ROS: no complaints, denies shortness of breath or chest pain and all other systems are negative for acute changes        Objective    Vitals:  Vitals:    10/07/24 0728   BP: 155/71   Pulse: 81   Resp: 17   Temp: (!) 97 °F (36.1 °C)   SpO2: 95%       Laboratory Results:  I have personally reviewed all pertinent laboratory/tests results.  Most Recent Labs:   Lab Results   Component Value Date    WBC 10.24 (H) 10/07/2024    RBC 4.13 10/07/2024    HGB 12.9 10/07/2024    HCT 37.8 10/07/2024     10/07/2024    RDW 14.7 10/07/2024    NEUTROABS 5.10 10/07/2024    SODIUM 138 09/30/2024    K 4.2 09/30/2024     09/30/2024    CO2 30 09/30/2024    BUN 16 09/30/2024    CREATININE 1.13 09/30/2024    GLUC 128 09/30/2024    GLUF 98 08/10/2024    CALCIUM 9.3 09/30/2024    AST 15 09/30/2024    ALT 17 09/30/2024    ALKPHOS 86 09/30/2024    TP 6.4 09/30/2024    ALB 3.7 09/30/2024    TBILI 0.28 09/30/2024    VALPROICTOT 26 (L) 09/30/2024    AMMONIA 32 07/03/2024    [id number] 2.000 07/24/2024    HGBA1C 6.4 (H) 05/03/2024     05/03/2024       Mental Status Evaluation:  Appearance:  disheveled, marginal hygiene   Behavior:  cooperative, bizarre, guarded   Speech:  normal rate and volume   Mood:  less irritable   Affect:  constricted   Thought Process:  illogical, tangential, perseverative, concrete   Thought Content:  Restorationist preoccupation   Perceptual Disturbances: appears preoccupied, talks to self at times   Risk Potential: Suicidal ideation - None  Homicidal ideation - None  Potential for aggression - No   Memory:  recent and remote memory grossly intact   Sensorium  person, place, and time/date      Consciousness:  alert and awake   Attention: attention span and concentration are age appropriate   Insight:  impaired due to psychosis   Judgment: impaired due to psychosis   Gait/Station: normal gait/station   Motor Activity: no abnormal movements        Counseling / Coordination of Care:  Patient's progress reviewed with nursing staff.  Medications, treatment progress and treatment plan reviewed with patient.  Supportive counseling provided to the patient.    This note was completed in part utilizing Dragon dictation Software. Grammatical, translation, syntax errors, random word insertions, spelling mistakes, and incomplete sentences may be an occasional consequence of this system secondary to software limitations with voice recognition, ambient noise, and hardware issues. If you have any questions or concerns about the content, text, or information contained within the body of this dictation, please contact the provider for clarification

## 2024-10-07 NOTE — NURSING NOTE
Pt disorganized but pleasant and med-compliant with fair appetite.  VSS.  Attended group.  Steady gait.  Anxious with constricted affect.  No paranoia noted.  Monitored for safety and support.

## 2024-10-07 NOTE — PLAN OF CARE
Problem: DISCHARGE PLANNING - CARE MANAGEMENT  Goal: Discharge to post-acute care or home with appropriate resources  Description: INTERVENTIONS:  - Conduct assessment to determine patient/family and health care team treatment goals, and need for post-acute services based on payer coverage, community resources, and patient preferences, and barriers to discharge  - Address psychosocial, clinical, and financial barriers to discharge as identified in assessment in conjunction with the patient/family and health care team  - Arrange appropriate level of post-acute services according to patient’s   needs and preference and payer coverage in collaboration with the physician and health care team  - Communicate with and update the patient/family, physician, and health care team regarding progress on the discharge plan  - Arrange appropriate transportation to post-acute venues  Outcome: Progressing     Problem: Prexisting or High Potential for Compromised Skin Integrity  Goal: Skin integrity is maintained or improved  Description: INTERVENTIONS:  - Identify patients at risk for skin breakdown  - Assess and monitor skin integrity  - Assess and monitor nutrition and hydration status  - Monitor labs   - Assess for incontinence   - Turn and reposition patient  - Assist with mobility/ambulation  - Relieve pressure over bony prominences  - Avoid friction and shearing  - Provide appropriate hygiene as needed including keeping skin clean and dry  - Evaluate need for skin moisturizer/barrier cream  - Collaborate with interdisciplinary team   - Patient/family teaching  - Consider wound care consult   Outcome: Progressing     Problem: Alteration in Thoughts and Perception  Goal: Treatment Goal: Gain control of psychotic behaviors/thinking, reduce/eliminate presenting symptoms and demonstrate improved reality functioning upon discharge  Outcome: Progressing  Goal: Verbalize thoughts and feelings  Description: Interventions:  - Promote  {AMB ASTHMA ACTION PLAN:486302}   a nonjudgmental and trusting relationship with the patient through active listening and therapeutic communication  - Assess patient's level of functioning, behavior and potential for risk  - Engage patient in 1 on 1 interactions  - Encourage patient to express fears, feelings, frustrations, and discuss symptoms    - [place] patient to reality, help patient recognize reality-based thinking   - Administer medications as ordered and assess for potential side effects  - Provide the patient education related to the signs and symptoms of the illness and desired effects of prescribed medications  Outcome: Progressing  Goal: Refrain from acting on delusional thinking/internal stimuli  Description: Interventions:  - Monitor patient closely, per order   - Utilize least restrictive measures   - Set reasonable limits, give positive feedback for acceptable   - Administer medications as ordered and monitor of potential side effects  Outcome: Progressing  Goal: Agree to be compliant with medication regime, as prescribed and report medication side effects  Description: Interventions:  - Offer appropriate PRN medication and supervise ingestion; conduct AIMS, as needed   Outcome: Progressing  Goal: Attend and participate in unit activities, including therapeutic, recreational, and educational groups  Description: Interventions:  -Encourage Visitation and family involvement in care  Outcome: Progressing  Goal: Recognize dysfunctional thoughts, communicate reality-based thoughts at the time of discharge  Description: Interventions:  - Provide medication and psycho-education to assist patient in compliance and developing insight into his/her illness   Outcome: Progressing  Goal: Complete daily ADLs, including personal hygiene independently, as able  Description: Interventions:  - Observe, teach, and assist patient with ADLS  - Monitor and promote a balance of rest/activity, with adequate nutrition and elimination   Outcome: Progressing      Problem: Ineffective Coping  Goal: Identifies ineffective coping skills  Outcome: Progressing  Goal: Demonstrates healthy coping skills  Outcome: Progressing  Goal: Participates in unit activities  Description: Interventions:  - Provide therapeutic environment   - Provide required programming   - Redirect inappropriate behaviors   Outcome: Progressing  Goal: Patient/Family participate in treatment and DC plans  Description: Interventions:  - Provide therapeutic environment  Outcome: Progressing  Goal: Patient/Family verbalizes awareness of resources  Outcome: Progressing  Goal: Understands least restrictive measures  Description: Interventions:  - Utilize least restrictive behavior  Outcome: Progressing  Goal: Free from restraint events  Description: - Utilize least restrictive measures   - Provide behavioral interventions   - Redirect inappropriate behaviors   Outcome: Progressing     Problem: Risk for Self Injury/Neglect  Goal: Treatment Goal: Remain safe during length of stay, learn and adopt new coping skills, and be free of self-injurious ideation, impulses and acts at the time of discharge  Outcome: Progressing  Goal: Verbalize thoughts and feelings  Description: Interventions:  - Assess and re-assess patient's lethality and potential for self-injury  - Engage patient in 1:1 interactions, daily, for a minimum of 15 minutes  - Encourage patient to express feelings, fears, frustrations, hopes  - Establish rapport/trust with patient   Outcome: Progressing  Goal: Refrain from harming self  Description: Interventions:  - Monitor patient closely, per order  - Develop a trusting relationship  - Supervise medication ingestion, monitor effects and side effects   Outcome: Progressing  Goal: Attend and participate in unit activities, including therapeutic, recreational, and educational groups  Description: Interventions:  - Provide therapeutic and educational activities daily, encourage attendance and participation,  and document same in the medical record  - Obtain collateral information, encourage visitation and family involvement in care   Outcome: Progressing  Goal: Recognize maladaptive responses and adopt new coping mechanisms  Outcome: Progressing     Problem: Depression  Goal: Treatment Goal: Demonstrate behavioral control of depressive symptoms, verbalize feelings of improved mood/affect, and adopt new coping skills prior to discharge  Outcome: Progressing  Goal: Verbalize thoughts and feelings  Description: Interventions:  - Assess and re-assess patient's level of risk   - Engage patient in 1:1 interactions, daily, for a minimum of 15 minutes   - Encourage patient to express feelings, fears, frustrations, hopes   Outcome: Progressing  Goal: Refrain from isolation  Description: Interventions:  - Develop a trusting relationship   - Encourage socialization   Outcome: Progressing  Goal: Refrain from self-neglect  Outcome: Progressing  Goal: Attend and participate in unit activities, including therapeutic, recreational, and educational groups  Description: Interventions:  - Provide therapeutic and educational activities daily, encourage attendance and participation, and document same in the medical record   Outcome: Progressing     Problem: Anxiety  Goal: Anxiety is at manageable level  Description: Interventions:  - Assess and monitor patient's anxiety level.   - Monitor for signs and symptoms (heart palpitations, chest pain, shortness of breath, headaches, nausea, feeling jumpy, restlessness, irritable, apprehensive).   - Collaborate with interdisciplinary team and initiate plan and interventions as ordered.  - [place] patient to unit/surroundings  - Explain treatment plan  - Encourage participation in care  - Encourage verbalization of concerns/fears  - Identify coping mechanisms  - Assist in developing anxiety-reducing skills  - Administer/offer alternative therapies  - Limit or eliminate stimulants  Outcome:  Progressing     Problem: SAFETY,RESTRAINT: NV/NON-SELF DESTRUCTIVE BEHAVIOR  Goal: Remains free of harm/injury (restraint for non violent/non self-detsructive behavior)  Description: INTERVENTIONS:  - Instruct patient/family regarding restraint use   - Assess and monitor physiologic and psychological status   - Provide interventions and comfort measures to meet assessed patient needs   - Identify and implement measures to help patient regain control  - Assess readiness for release of restraint   Outcome: Progressing  Goal: Returns to optimal restraint-free functioning  Description: INTERVENTIONS:  - Assess the patient's behavior and symptoms that indicate continued need for restraint  - Identify and implement measures to help patient regain control  - Assess readiness for release of restraint   Outcome: Progressing     Problem: Potential for Falls  Goal: Patient will remain free of falls  Description: INTERVENTIONS:  - Educate patient on patient safety including physical limitations  - Instruct patient to call for assistance with activity   - Consult OT/PT to assist with strengthening/mobility   - Keep Call bell within reach  - Keep bed low and locked with side rails adjusted as appropriate  - Keep care items and personal belongings within reach  - Initiate and maintain comfort rounds  - Offer Toileting every 2 Hours, in advance of need  - Initiate/Maintain bed and chair alarm  - Obtain necessary fall risk management equipment: walker, wheelchair  - Apply yellow socks and bracelet for high fall risk patients  - Patient moved to room near nurses station  Outcome: Progressing     Problem: Nutrition/Hydration-ADULT  Goal: Nutrient/Hydration intake appropriate for improving, restoring or maintaining nutritional needs  Description: Monitor and assess patient's nutrition/hydration status for malnutrition. Collaborate with interdisciplinary team and initiate plan and interventions as ordered.  Monitor patient's weight and  dietary intake as ordered or per policy. Utilize nutrition screening tool and intervene as necessary. Determine patient's food preferences and provide high-protein, high-caloric foods as appropriate.     INTERVENTIONS:  - Monitor oral intake, urinary output, labs, and treatment plans  - Assess nutrition and hydration status and recommend course of action  - Evaluate amount of meals eaten  - Assist patient with eating if necessary   - Allow adequate time for meals  - Recommend/ encourage appropriate diets, oral nutritional supplements, and vitamin/mineral supplements  - Order, calculate, and assess calorie counts as needed  - Recommend, monitor, and adjust tube feedings and TPN/PPN based on assessed needs  - Assess need for intravenous fluids  - Provide specific nutrition/hydration education as appropriate  - Include patient/family/caregiver in decisions related to nutrition  Outcome: Progressing

## 2024-10-07 NOTE — NURSING NOTE
Pt visible in milieu and social with peers. Pt needed to be redirected several time to respect boundaries with male patients. Pt denies psych symptoms but is visible responding when in her room. Pt paranoid and religiously preoccupied. Pt cooperative with care and medication compliant with encouragement.

## 2024-10-07 NOTE — PROGRESS NOTES
10/07/24 0811   Team Meeting   Meeting Type Daily Rounds   Initial Conference Date 10/07/24   Next Conference Date 10/08/24   Team Members Present   Team Members Present Physician;Nurse;;   Physician Team Member Dr Banuelos, JAIR Goldman   Nursing Team Member Clarissa   Care Management Team Member Anum   Social Work Team Member Ayse   Patient/Family Present   Patient Present No   Patient's Family Present No     Compliant with meds, watch with males on unit, 2 episodes with behavioral incontinence, increase riperdal, clozaril level tonight.

## 2024-10-08 PROCEDURE — 99232 SBSQ HOSP IP/OBS MODERATE 35: CPT | Performed by: STUDENT IN AN ORGANIZED HEALTH CARE EDUCATION/TRAINING PROGRAM

## 2024-10-08 RX ADMIN — CARVEDILOL 6.25 MG: 6.25 TABLET, FILM COATED ORAL at 08:34

## 2024-10-08 RX ADMIN — FLUTICASONE FUROATE 1 PUFF: 100 POWDER RESPIRATORY (INHALATION) at 08:35

## 2024-10-08 RX ADMIN — CARVEDILOL 6.25 MG: 6.25 TABLET, FILM COATED ORAL at 15:57

## 2024-10-08 RX ADMIN — CYANOCOBALAMIN TAB 1000 MCG 1000 MCG: 1000 TAB at 08:34

## 2024-10-08 RX ADMIN — CLOZAPINE 700 MG: 200 TABLET ORAL at 21:36

## 2024-10-08 RX ADMIN — POLYETHYLENE GLYCOL 3350 17 G: 17 POWDER, FOR SOLUTION ORAL at 08:35

## 2024-10-08 RX ADMIN — PRAZOSIN HYDROCHLORIDE 2 MG: 1 CAPSULE ORAL at 21:36

## 2024-10-08 RX ADMIN — NICOTINE 1 PATCH: 7 PATCH, EXTENDED RELEASE TRANSDERMAL at 08:37

## 2024-10-08 RX ADMIN — FAMOTIDINE 20 MG: 20 TABLET ORAL at 08:34

## 2024-10-08 RX ADMIN — FAMOTIDINE 20 MG: 20 TABLET ORAL at 17:38

## 2024-10-08 RX ADMIN — Medication 15 ML: at 08:35

## 2024-10-08 RX ADMIN — TRAZODONE HYDROCHLORIDE 50 MG: 50 TABLET ORAL at 21:36

## 2024-10-08 RX ADMIN — MELATONIN TAB 3 MG 3 MG: 3 TAB at 21:35

## 2024-10-08 RX ADMIN — DIVALPROEX SODIUM 1000 MG: 500 TABLET, EXTENDED RELEASE ORAL at 21:35

## 2024-10-08 RX ADMIN — SENNOSIDES AND DOCUSATE SODIUM 2 TABLET: 8.6; 5 TABLET ORAL at 21:35

## 2024-10-08 RX ADMIN — RISPERIDONE 2 MG: 2 TABLET, FILM COATED ORAL at 21:36

## 2024-10-08 RX ADMIN — LORAZEPAM 0.5 MG: 0.5 TABLET ORAL at 08:34

## 2024-10-08 RX ADMIN — LORAZEPAM 0.5 MG: 0.5 TABLET ORAL at 17:38

## 2024-10-08 RX ADMIN — ATORVASTATIN CALCIUM 10 MG: 10 TABLET, FILM COATED ORAL at 08:34

## 2024-10-08 NOTE — PROGRESS NOTES
10/08/24 0752   Team Meeting   Meeting Type Daily Rounds   Initial Conference Date 10/08/24   Next Conference Date 10/09/24   Team Members Present   Team Members Present Physician;Nurse;;   Physician Team Member Dr Banuelos, JAIR Goldman   Nursing Team Member Clarissa   Care Management Team Member Anum   Social Work Team Member Ayse   Patient/Family Present   Patient Present No   Patient's Family Present No     Clozaril level 711, disorganized conversations, very interested in male peers, discharge pending CRR.

## 2024-10-08 NOTE — NURSING NOTE
Patient flat, calm and cooperative, able to make needs known. Is visible on the module, social with selet peers, attends groups. Denies SI/HI/AVH, anxiety or depression. Medication compliant. Will continue to monitor.

## 2024-10-08 NOTE — NURSING NOTE
Pt redirected from standing in male peer's doorway.  Good appetite and steady gait.  VSS.  Med-compliant.  Attended group.  Preoccupied with no agitation noted.  Monitored for safety and support.

## 2024-10-08 NOTE — PROGRESS NOTES
Progress Note - Behavioral Health   Name: Meli Nowak 59 y.o. female I MRN: [id number]  Unit/Bed#: [facility] 651-02 I Date of Admission: 7/23/2024   Date of Service: 10/8/2024 I Hospital Day: 77     Assessment & Plan  Schizoaffective disorder, bipolar type (HCC)  Progress towards goals  Awaiting CRR placement  Prozac is being tapered off  VPA level obtained on 9/30/2024 at 26;no changes to be made  Two antipsychotics utilized secondary to multiple failures of monotherapy  Continue medications as prescribed  Clozaril 700 mg nightly for schizoaffective disorder, Clozaril level 9/23 was 647, last ANC on 10/7 was 5.10    Risperdal to 2 mg qhs for ongoing psychosis   Ativan 0.5 mg twice daily for anxiety and paranoia  Melatonin 3 mg nightly for insomnia  Prazosin 2 mg nightly for PTSD associated nightmares   trazodone 50 mg nightly for insomnia    No associated orders from this encounter found during lookback period of 72 hours.        Plan   Progress Toward Goals:   Meli is progressing towards goals of inpatient psychiatric treatment by continued medication compliance and is attending therapeutic modalities on the milieu. However, the patient continues to require inpatient psychiatric hospitalization for continued medication management and titration to optimize symptom reduction, improve sleep hygiene, and demonstrate adequate self-care.    Recommended Treatment: Treatment plan and medication changes discussed and per the attending physician the plan is:    1.Continue with group therapy, milieu therapy and occupational therapy  2.Behavioral Health checks every 7 minutes  3.Continue frequent safety checks and vitals per unit protocol  4.Continue with SLIM medical management as indicated  5.Continue with current medication regimen  6.Will review labs in the a.m.  7.Disposition Planning: Discharge planning and efforts remain ongoing    Behavioral Health Medications: all current active meds have been reviewed and continue  current psychiatric medications.  Current Facility-Administered Medications   Medication Dose Route Frequency Provider Last Rate    acetaminophen  650 mg Oral Q4H PRN Marie Ziegler, CRNP      acetaminophen  650 mg Oral Q4H PRN Marie Ziegler, CRNP      acetaminophen  975 mg Oral Q6H PRN Marie Ziegler, CRNP      aluminum-magnesium hydroxide-simethicone  30 mL Oral Q4H PRN Parris Bolanos, CRNP      Artificial Tears  1 drop Both Eyes Q6H PRN Dereje Banuelos MD      atorvastatin  10 mg Oral Daily Marie Ziegler, CRNP      bisacodyl  10 mg Oral Daily PRN Kristen Logan, CRNP      bisacodyl  10 mg Rectal Daily PRN Marie Ziegler, CRNP      carvedilol  6.25 mg Oral BID With Meals Marie Ziegler, CRNP      cloZAPine  700 mg Oral HS Marie Ziegler, CRNP      cyanocobalamin  1,000 mcg Oral Daily Marie Ziegler, CRNP      divalproex sodium  1,000 mg Oral HS Marie Ziegler, CRNP      famotidine  20 mg Oral BID Shan Fitzgerald DO      fluticasone  1 puff Inhalation Daily Marie Ziegler, CRNP      hydrOXYzine HCL  25 mg Oral Q6H PRN Max 4/day Marie Ziegler, CRNP      hydrOXYzine HCL  50 mg Oral Q6H PRN Max 4/day Marie Ziegler, CRNP      ipratropium-albuterol  3 mL Nebulization Q4H PRN Darin Lawton MD      LORazepam  0.5 mg Oral BID Marie Ziegler, CRNP      Or    LORazepam  0.5 mg Intramuscular BID Marie Ziegler, CRNP      LORazepam  1 mg Intramuscular Q6H PRN Max 3/day Marie Ziegler, CRNP      LORazepam  1 mg Oral Q6H PRN Max 3/day Marie Ziegler, CRNP      melatonin  3 mg Oral HS Marie Ziegler, CRNP      mirtazapine  7.5 mg Oral HS PRN Marie Ziegler, CRNP      Multivitamin  15 mL Oral Daily Marie Ziegler, CRNP      nicotine  1 patch Transdermal Daily Marie Ziegler, CRNP      OLANZapine  2.5 mg Oral Q6H PRN Dereje Banuelos MD      Or    OLANZapine  2.5 mg Intramuscular Q6H PRN Dereje Banuelos MD      OLANZapine  5 mg Oral Q6H PRN Dereje Banuelos MD      Or    OLANZapine  5 mg  "Intramuscular Q6H PRN Dereje Banuelos MD      ondansetron  4 mg Oral Q6H PRN Darin Lawton MD      polyethylene glycol  17 g Oral Daily JOSE ELIAS Ortega      polyethylene glycol  17 g Oral Daily PRN Marie Ziegler, JOSE ELIAS      prazosin  2 mg Oral HS Marie Ziegler, JOSE ELIAS      risperiDONE  2 mg Oral HS JOSE ELIAS Figueroa      senna-docusate sodium  2 tablet Oral HS Rehana Borrego PA-C      traZODone  50 mg Oral HS JOSE ELIAS Figueroa         Risks / Benefits of Treatment:  Risks, benefits, and possible side effects of medications explained to patient and patient verbalizes understanding and agreement for treatment.       Subjective    Patient was seen today for continuation of care, records reviewed and patient was discussed with the morning case review team.    Meli was seen today for psychiatric follow-up.  On assessment today, Meli was irritable and labile. Meli appeared with pressured speech, irritated over getting the same lunch two days in a row. Meli continued to wake up tangential and upset about blood work yesterday. She stated, \"Don't they know that hurts, next time they come they are going to get it\". Meli showed her fist at writer. Meli states \" I don't need labs, I don't take Clozaril anymore\", patient provided as patient has been restarted on medication for ongoing psychosis and disorganization. Sexually preoccupied, patient needs frequent redirection from staff as she is noted to be intrusive with male peers.  Paranoia and internal preoccupation noted, no other psychiatric symptoms reported but patient does appear visibly anxious.   Endorses good appetite and sleep. Last bowel movement today, Meli is also compliant with medications and mouth checks.  Meli attempted to end the interview multiple times with writer stating, \"I don't want to talk anymore\", but continued the interview without any encouragement from writer. Meli requires two antipsychotics secondary to multiple " failures of monotherapy. No EPS or adverse symptoms reported or noted on exam. Continue Clozaril 700 mg at bedtime and Risperdal 2 mg at bedtime for psychosis. Clozaril level 711 on 10/7. Weekly labs unremarkable. Tentative discharge ongoing pending CRR placement.     Meli denies acute suicidal/self-harm ideation/intent/plan upon direct inquiry at this time.  Meli remains behaviorally appropriate, no agitation or aggression noted on exam or in report.  Impulse control is intact.  Meli remains adherent to her current psychotropic medication regimen and denies any side effects from medications, as well as none noted on exam.    Psychiatric Review of Systems:  Behavior over the last 24 hours:  unchanged.   Sleep: good  Appetite: good  Medication side effects: none  ROS: no complaints, denies shortness of breath or chest pain and all other systems are negative for acute changes        Objective    Vitals:  Vitals:    10/08/24 0736   BP: (!) 176/77   Pulse: 99   Resp: 17   Temp: (!) 97.1 °F (36.2 °C)   SpO2: 96%       Laboratory Results:  I have personally reviewed all pertinent laboratory/tests results.    Mental Status Evaluation:  Appearance:  disheveled, marginal hygiene   Behavior:  bizarre, some agitation, responds to redirection   Speech:  pressured, increased volume, tangential   Mood:  anxious, labile, irritable   Affect:  constricted   Thought Process:  illogical, tangential, perseverative, concrete   Thought Content:  sexual preoccupation, paranoid ideation   Perceptual Disturbances: appears preoccupied, talks to self at times   Risk Potential: Suicidal ideation - None  Homicidal ideation - None  Potential for aggression - No   Memory:  recent and remote memory grossly intact   Sensorium  person, place, and time/date      Consciousness:  alert and awake   Attention: attention span and concentration are age appropriate   Insight:  impaired due to psychosis   Judgment: impaired due to psychosis   Gait/Station:  normal gait/station   Motor Activity: no abnormal movements       Counseling / Coordination of Care:  Patient's progress reviewed with nursing staff.  Medications, treatment progress and treatment plan reviewed with patient.  Supportive counseling provided to the patient.    This note was completed in part utilizing Dragon dictation Software. Grammatical, translation, syntax errors, random word insertions, spelling mistakes, and incomplete sentences may be an occasional consequence of this system secondary to software limitations with voice recognition, ambient noise, and hardware issues. If you have any questions or concerns about the content, text, or information contained within the body of this dictation, please contact the provider for clarification

## 2024-10-08 NOTE — PLAN OF CARE
Problem: Alteration in Thoughts and Perception  Goal: Treatment Goal: Gain control of psychotic behaviors/thinking, reduce/eliminate presenting symptoms and demonstrate improved reality functioning upon discharge  Outcome: Progressing  Goal: Verbalize thoughts and feelings  Description: Interventions:  - Promote a nonjudgmental and trusting relationship with the patient through active listening and therapeutic communication  - Assess patient's level of functioning, behavior and potential for risk  - Engage patient in 1 on 1 interactions  - Encourage patient to express fears, feelings, frustrations, and discuss symptoms    - [place] patient to reality, help patient recognize reality-based thinking   - Administer medications as ordered and assess for potential side effects  - Provide the patient education related to the signs and symptoms of the illness and desired effects of prescribed medications  Outcome: Progressing  Goal: Refrain from acting on delusional thinking/internal stimuli  Description: Interventions:  - Monitor patient closely, per order   - Utilize least restrictive measures   - Set reasonable limits, give positive feedback for acceptable   - Administer medications as ordered and monitor of potential side effects  Outcome: Progressing  Goal: Agree to be compliant with medication regime, as prescribed and report medication side effects  Description: Interventions:  - Offer appropriate PRN medication and supervise ingestion; conduct AIMS, as needed   Outcome: Progressing  Goal: Recognize dysfunctional thoughts, communicate reality-based thoughts at the time of discharge  Description: Interventions:  - Provide medication and psycho-education to assist patient in compliance and developing insight into his/her illness   Outcome: Progressing  Goal: Complete daily ADLs, including personal hygiene independently, as able  Description: Interventions:  - Observe, teach, and assist patient with ADLS  - Monitor and  promote a balance of rest/activity, with adequate nutrition and elimination   Outcome: Progressing     Problem: Risk for Self Injury/Neglect  Goal: Treatment Goal: Remain safe during length of stay, learn and adopt new coping skills, and be free of self-injurious ideation, impulses and acts at the time of discharge  Outcome: Progressing  Goal: Verbalize thoughts and feelings  Description: Interventions:  - Assess and re-assess patient's lethality and potential for self-injury  - Engage patient in 1:1 interactions, daily, for a minimum of 15 minutes  - Encourage patient to express feelings, fears, frustrations, hopes  - Establish rapport/trust with patient   Outcome: Progressing  Goal: Refrain from harming self  Description: Interventions:  - Monitor patient closely, per order  - Develop a trusting relationship  - Supervise medication ingestion, monitor effects and side effects   Outcome: Progressing  Goal: Recognize maladaptive responses and adopt new coping mechanisms  Outcome: Progressing     Problem: Depression  Goal: Treatment Goal: Demonstrate behavioral control of depressive symptoms, verbalize feelings of improved mood/affect, and adopt new coping skills prior to discharge  Outcome: Progressing  Goal: Verbalize thoughts and feelings  Description: Interventions:  - Assess and re-assess patient's level of risk   - Engage patient in 1:1 interactions, daily, for a minimum of 15 minutes   - Encourage patient to express feelings, fears, frustrations, hopes   Outcome: Progressing  Goal: Refrain from isolation  Description: Interventions:  - Develop a trusting relationship   - Encourage socialization   Outcome: Progressing  Goal: Refrain from self-neglect  Outcome: Progressing  Goal: Attend and participate in unit activities, including therapeutic, recreational, and educational groups  Description: Interventions:  - Provide therapeutic and educational activities daily, encourage attendance and participation, and  document same in the medical record   Outcome: Progressing     Problem: Anxiety  Goal: Anxiety is at manageable level  Description: Interventions:  - Assess and monitor patient's anxiety level.   - Monitor for signs and symptoms (heart palpitations, chest pain, shortness of breath, headaches, nausea, feeling jumpy, restlessness, irritable, apprehensive).   - Collaborate with interdisciplinary team and initiate plan and interventions as ordered.  - [place] patient to unit/surroundings  - Explain treatment plan  - Encourage participation in care  - Encourage verbalization of concerns/fears  - Identify coping mechanisms  - Assist in developing anxiety-reducing skills  - Administer/offer alternative therapies  - Limit or eliminate stimulants  Outcome: Progressing

## 2024-10-08 NOTE — ASSESSMENT & PLAN NOTE
Progress towards goals  Awaiting CRR placement  Prozac is being tapered off  VPA level obtained on 9/30/2024 at 26;no changes to be made  Two antipsychotics utilized secondary to multiple failures of monotherapy  Continue medications as prescribed  Clozaril 700 mg nightly for schizoaffective disorder, Clozaril level 9/23 was 647, last ANC on 10/7 was 5.10    Risperdal to 2 mg qhs for ongoing psychosis   Ativan 0.5 mg twice daily for anxiety and paranoia  Melatonin 3 mg nightly for insomnia  Prazosin 2 mg nightly for PTSD associated nightmares   trazodone 50 mg nightly for insomnia    No associated orders from this encounter found during lookback period of 72 hours.

## 2024-10-09 PROCEDURE — 99232 SBSQ HOSP IP/OBS MODERATE 35: CPT | Performed by: STUDENT IN AN ORGANIZED HEALTH CARE EDUCATION/TRAINING PROGRAM

## 2024-10-09 RX ADMIN — LORAZEPAM 0.5 MG: 0.5 TABLET ORAL at 17:26

## 2024-10-09 RX ADMIN — NICOTINE 1 PATCH: 7 PATCH, EXTENDED RELEASE TRANSDERMAL at 08:27

## 2024-10-09 RX ADMIN — SENNOSIDES AND DOCUSATE SODIUM 2 TABLET: 8.6; 5 TABLET ORAL at 21:18

## 2024-10-09 RX ADMIN — CYANOCOBALAMIN TAB 1000 MCG 1000 MCG: 1000 TAB at 08:28

## 2024-10-09 RX ADMIN — LORAZEPAM 0.5 MG: 0.5 TABLET ORAL at 08:28

## 2024-10-09 RX ADMIN — FLUTICASONE FUROATE 1 PUFF: 100 POWDER RESPIRATORY (INHALATION) at 08:27

## 2024-10-09 RX ADMIN — FAMOTIDINE 20 MG: 20 TABLET ORAL at 17:26

## 2024-10-09 RX ADMIN — MELATONIN TAB 3 MG 3 MG: 3 TAB at 21:18

## 2024-10-09 RX ADMIN — FAMOTIDINE 20 MG: 20 TABLET ORAL at 08:28

## 2024-10-09 RX ADMIN — POLYETHYLENE GLYCOL 3350 17 G: 17 POWDER, FOR SOLUTION ORAL at 08:27

## 2024-10-09 RX ADMIN — CARVEDILOL 6.25 MG: 6.25 TABLET, FILM COATED ORAL at 08:28

## 2024-10-09 RX ADMIN — DIVALPROEX SODIUM 1250 MG: 500 TABLET, EXTENDED RELEASE ORAL at 21:18

## 2024-10-09 RX ADMIN — CLOZAPINE 700 MG: 200 TABLET ORAL at 21:18

## 2024-10-09 RX ADMIN — RISPERIDONE 2 MG: 2 TABLET, FILM COATED ORAL at 21:18

## 2024-10-09 RX ADMIN — CARVEDILOL 6.25 MG: 6.25 TABLET, FILM COATED ORAL at 17:26

## 2024-10-09 RX ADMIN — TRAZODONE HYDROCHLORIDE 50 MG: 50 TABLET ORAL at 21:18

## 2024-10-09 RX ADMIN — ATORVASTATIN CALCIUM 10 MG: 10 TABLET, FILM COATED ORAL at 08:28

## 2024-10-09 RX ADMIN — Medication 15 ML: at 08:27

## 2024-10-09 NOTE — PROGRESS NOTES
Progress Note - Behavioral Health   Name: Meli Noawk 59 y.o. female I MRN: [id number]  Unit/Bed#: [facility] 651-02 I Date of Admission: 7/23/2024   Date of Service: 10/9/2024 I Hospital Day: 78     Assessment & Plan  Schizoaffective disorder, bipolar type (HCC)  Progress towards goals  Awaiting CRR placement  Prozac is being tapered off  Increase Depakote to 1250 mg nightly, VPA level to be obtained on 10/12/2024  Two antipsychotics utilized secondary to multiple failures of monotherapy  Continue medications as prescribed  Clozaril 700 mg nightly for schizoaffective disorder, Clozaril level 10/7 was 711, last ANC on 10/7 was 5.10    Risperdal to 2 mg qhs for ongoing psychosis   Ativan 0.5 mg twice daily for anxiety and paranoia  Melatonin 3 mg nightly for insomnia  Prazosin 2 mg nightly for PTSD associated nightmares   trazodone 50 mg nightly for insomnia          Plan   Progress Toward Goals:   Meli is progressing towards goals of inpatient psychiatric treatment by continued medication compliance and is attending therapeutic modalities on the milieu. However, the patient continues to require inpatient psychiatric hospitalization for continued medication management and titration to optimize symptom reduction, improve sleep hygiene, and demonstrate adequate self-care.    Recommended Treatment: Treatment plan and medication changes discussed and per the attending physician the plan is:    1.Continue with group therapy, milieu therapy and occupational therapy  2.Behavioral Health checks every 7 minutes  3.Continue frequent safety checks and vitals per unit protocol  4.Continue with SLIM medical management as indicated  5.Continue with current medication regimen  6.Will review labs in the a.m.  7.Disposition Planning: Discharge planning and efforts remain ongoing    Behavioral Health Medications: all current active meds have been reviewed and continue current psychiatric medications.  Current Facility-Administered  Medications   Medication Dose Route Frequency Provider Last Rate    acetaminophen  650 mg Oral Q4H PRN Marie Ziegler, CRNP      acetaminophen  650 mg Oral Q4H PRN Marie Ziegler, CRNP      acetaminophen  975 mg Oral Q6H PRN Marie Ziegler, CRNP      aluminum-magnesium hydroxide-simethicone  30 mL Oral Q4H PRN Parris Bolanos, CRNP      Artificial Tears  1 drop Both Eyes Q6H PRN Dereje Banuelos MD      atorvastatin  10 mg Oral Daily Marie Ziegler, CRNP      bisacodyl  10 mg Oral Daily PRN Kristen Logan, CRNP      bisacodyl  10 mg Rectal Daily PRN Marie Ziegler, CRNP      carvedilol  6.25 mg Oral BID With Meals Marie Ziegler, CRNP      cloZAPine  700 mg Oral HS Marie Ziegler, CRNP      cyanocobalamin  1,000 mcg Oral Daily Marie Ziegler, CRNP      divalproex sodium  1,250 mg Oral HS Marie Ziegler, CRNP      famotidine  20 mg Oral BID Shan Fitzgerald,       fluticasone  1 puff Inhalation Daily Marie Ziegler, CRNP      hydrOXYzine HCL  25 mg Oral Q6H PRN Max 4/day Marie Ziegler, CRNP      hydrOXYzine HCL  50 mg Oral Q6H PRN Max 4/day Marie Ziegler, CRNP      ipratropium-albuterol  3 mL Nebulization Q4H PRN Darin Lawton MD      LORazepam  0.5 mg Oral BID Marie Ziegler, CRNP      Or    LORazepam  0.5 mg Intramuscular BID Marie Ziegler, CRNP      LORazepam  1 mg Intramuscular Q6H PRN Max 3/day Marie Ziegler, CRNP      LORazepam  1 mg Oral Q6H PRN Max 3/day Marie Ziegler, CRNP      melatonin  3 mg Oral HS Marie Ziegler, CRNP      mirtazapine  7.5 mg Oral HS PRN Marie Ziegler, CRNP      Multivitamin  15 mL Oral Daily Marie Ziegler, CRNP      nicotine  1 patch Transdermal Daily Marie Ziegler, CRNP      OLANZapine  2.5 mg Oral Q6H PRN Dereje Banuelos MD      Or    OLANZapine  2.5 mg Intramuscular Q6H PRN Dereje Banuelos MD      OLANZapine  5 mg Oral Q6H PRN Dereje Banuelos MD      Or    OLANZapine  5 mg Intramuscular Q6H PRN Dereje Banuelos MD      ondansetron  4 mg Oral Q6H  PAVAN Lawton MD      polyethylene glycol  17 g Oral Daily Kristenabdoulaye Tomasjimmy Logan, JOSE ELIAS      polyethylene glycol  17 g Oral Daily PRN JOSE ELIAS Figueroa      prazosin  2 mg Oral HS JOSE ELIAS Figueroa      risperiDONE  2 mg Oral HS JOSE ELIAS Figueroa      senna-docusate sodium  2 tablet Oral HS Rehana Borrego PA-C      traZODone  50 mg Oral HS JOSE ELIAS Figueroa         Risks / Benefits of Treatment:  Risks, benefits, and possible side effects of medications explained to patient and patient verbalizes understanding and agreement for treatment.       Subjective    Patient was seen today for continuation of care, records reviewed and patient was discussed with the morning case review team.    Meli was seen today for psychiatric follow-up.  On assessment today, Meli was evasive and guarded.  [place] preoccupied with male peers, patient must be continuously redirected.  Nondenominational preoccupation ongoing and fixed, patient continues to also have paranoid ideation and must be continuously reassured.  Currently medication compliant, patient tolerating Clozaril 700 mg nightly and Risperdal 2 mg nightly due to ongoing psychosis.  2 antipsychotics utilized secondary to multiple failures of monotherapy, patient also must be treated with mood stabilizer due to mood variability.  Depakote currently prescribed at 1000 mg nightly, will increase to 1500 nightly with VPA level to be obtained on 10/12/2024.  Weekly labs within normal limits, Clozaril level obtained on 10/7/2024 at 711.  We will continue to titrate medication to assist with symptoms.  Tentative discharge pending CRR placement.    Meli denies acute suicidal/self-harm ideation/intent/plan upon direct inquiry at this time.  Meli remains behaviorally appropriate, no agitation or aggression noted on exam or in report.  Impulse control is intact.  Meli remains adherent to her current psychotropic medication regimen and denies any side effects from  medications, as well as none noted on exam.    Psychiatric Review of Systems:  Behavior over the last 24 hours:  unchanged.   Sleep: good  Appetite: good  Medication side effects: none  ROS: no complaints, denies shortness of breath or chest pain and all other systems are negative for acute changes        Objective    Vitals:  Vitals:    10/09/24 0734   BP: 150/79   Pulse: 81   Resp: 16   Temp: 97.6 °F (36.4 °C)   SpO2: 93%       Laboratory Results:  I have personally reviewed all pertinent laboratory/tests results.  Most Recent Labs:   Lab Results   Component Value Date    WBC 10.24 (H) 10/07/2024    RBC 4.13 10/07/2024    HGB 12.9 10/07/2024    HCT 37.8 10/07/2024     10/07/2024    RDW 14.7 10/07/2024    NEUTROABS 5.10 10/07/2024    SODIUM 138 09/30/2024    K 4.2 09/30/2024     09/30/2024    CO2 30 09/30/2024    BUN 16 09/30/2024    CREATININE 1.13 09/30/2024    GLUC 128 09/30/2024    GLUF 98 08/10/2024    CALCIUM 9.3 09/30/2024    AST 15 09/30/2024    ALT 17 09/30/2024    ALKPHOS 86 09/30/2024    TP 6.4 09/30/2024    ALB 3.7 09/30/2024    TBILI 0.28 09/30/2024    VALPROICTOT 26 (L) 09/30/2024    AMMONIA 32 07/03/2024    [id number] 2.000 07/24/2024    HGBA1C 6.4 (H) 05/03/2024     05/03/2024       Mental Status Evaluation:  Appearance:  disheveled, marginal hygiene   Behavior:  bizarre, guarded   Speech:  normal rate and volume   Mood:  less anxious, less depressed   Affect:  constricted   Thought Process:  illogical, perseverative, concrete   Thought Content:  Gnosticism preoccupation, paranoid ideation   Perceptual Disturbances: appears preoccupied, denies when asked, but talks to self at times   Risk Potential: Suicidal ideation - None  Homicidal ideation - None  Potential for aggression - No   Memory:  recent and remote memory grossly intact   Sensorium  person, place, and time/date      Consciousness:  alert and awake   Attention: attention span and concentration are age appropriate    Insight:  impaired   Judgment: impaired   Gait/Station: normal gait/station   Motor Activity: no abnormal movements       Counseling / Coordination of Care:  Patient's progress reviewed with nursing staff.  Medications, treatment progress and treatment plan reviewed with patient.  Supportive counseling provided to the patient.    This note was completed in part utilizing Dragon dictation Software. Grammatical, translation, syntax errors, random word insertions, spelling mistakes, and incomplete sentences may be an occasional consequence of this system secondary to software limitations with voice recognition, ambient noise, and hardware issues. If you have any questions or concerns about the content, text, or information contained within the body of this dictation, please contact the provider for clarification

## 2024-10-09 NOTE — NURSING NOTE
Patient was visible and social with select peers in the milieu with watching tv. Denies all psych s/s but stated she is nervous and will not elaborate why she is nervous. No behaviors noted. No c/o pain. Had 100% for dinner. Took her medications. Safety checks ongoing.

## 2024-10-09 NOTE — TREATMENT TEAM
10/09/24 1000 10/09/24 1330   Activity/Group Checklist   Group Community meeting Other (Comment)  (Communictaion and boudnaries)   Attendance Refused  (wanted to shower) Other (Comment)  (late and left)   Attendance Duration (min)  --  46-60   Interactions  --  Did not interact   Affect/Mood  --  Other (Comment)  (restless)   Goals Achieved  --  Identified feelings;Discussed coping strategies;Able to listen to others;Discussed self-esteem issues

## 2024-10-09 NOTE — ASSESSMENT & PLAN NOTE
Progress towards goals  Awaiting CRR placement  Prozac is being tapered off  Increase Depakote to 1250 mg nightly, VPA level to be obtained on 10/12/2024  Two antipsychotics utilized secondary to multiple failures of monotherapy  Continue medications as prescribed  Clozaril 700 mg nightly for schizoaffective disorder, Clozaril level 10/7 was 711, last ANC on 10/7 was 5.10    Risperdal to 2 mg qhs for ongoing psychosis   Ativan 0.5 mg twice daily for anxiety and paranoia  Melatonin 3 mg nightly for insomnia  Prazosin 2 mg nightly for PTSD associated nightmares   trazodone 50 mg nightly for insomnia

## 2024-10-09 NOTE — PROGRESS NOTES
10/09/24 0816   Team Meeting   Meeting Type Daily Rounds   Initial Conference Date 10/09/24   Next Conference Date 10/10/24   Team Members Present   Team Members Present Physician;Nurse;;   Physician Team Member Dr Banuelos, JAIR Goldman   Nursing Team Member Clarissa   Care Management Team Member Anum   Social Work Team Member Ayse   Patient/Family Present   Patient Present No   Patient's Family Present No     [place] preoccupied with men, touching peer and redirected, compliant with meds and mouth checks, increase depakote, Discharge pending CRR.

## 2024-10-09 NOTE — NURSING NOTE
Pt anxious with guarded affect.  VSS.  Good appetite and steady gait.  VSS.  Attended group.  Pt very hesitant to entering conference room for meeting with .  Med-compliant.  Monitored for safety and support.

## 2024-10-10 PROCEDURE — 99232 SBSQ HOSP IP/OBS MODERATE 35: CPT | Performed by: STUDENT IN AN ORGANIZED HEALTH CARE EDUCATION/TRAINING PROGRAM

## 2024-10-10 RX ADMIN — ALUMINUM HYDROXIDE, MAGNESIUM HYDROXIDE, AND DIMETHICONE 30 ML: 200; 20; 200 SUSPENSION ORAL at 18:05

## 2024-10-10 RX ADMIN — FAMOTIDINE 20 MG: 20 TABLET ORAL at 09:01

## 2024-10-10 RX ADMIN — LORAZEPAM 0.5 MG: 0.5 TABLET ORAL at 17:01

## 2024-10-10 RX ADMIN — NICOTINE 1 PATCH: 7 PATCH, EXTENDED RELEASE TRANSDERMAL at 08:28

## 2024-10-10 RX ADMIN — PRAZOSIN HYDROCHLORIDE 2 MG: 1 CAPSULE ORAL at 21:41

## 2024-10-10 RX ADMIN — MELATONIN TAB 3 MG 3 MG: 3 TAB at 21:41

## 2024-10-10 RX ADMIN — TRAZODONE HYDROCHLORIDE 50 MG: 50 TABLET ORAL at 21:41

## 2024-10-10 RX ADMIN — DIVALPROEX SODIUM 1250 MG: 500 TABLET, EXTENDED RELEASE ORAL at 21:41

## 2024-10-10 RX ADMIN — Medication 15 ML: at 09:01

## 2024-10-10 RX ADMIN — ATORVASTATIN CALCIUM 10 MG: 10 TABLET, FILM COATED ORAL at 09:01

## 2024-10-10 RX ADMIN — CARVEDILOL 6.25 MG: 6.25 TABLET, FILM COATED ORAL at 08:27

## 2024-10-10 RX ADMIN — FAMOTIDINE 20 MG: 20 TABLET ORAL at 17:01

## 2024-10-10 RX ADMIN — POLYETHYLENE GLYCOL 3350 17 G: 17 POWDER, FOR SOLUTION ORAL at 09:01

## 2024-10-10 RX ADMIN — CLOZAPINE 700 MG: 200 TABLET ORAL at 21:40

## 2024-10-10 RX ADMIN — CYANOCOBALAMIN TAB 1000 MCG 1000 MCG: 1000 TAB at 09:01

## 2024-10-10 RX ADMIN — LORAZEPAM 0.5 MG: 0.5 TABLET ORAL at 08:27

## 2024-10-10 RX ADMIN — FLUTICASONE FUROATE 1 PUFF: 100 POWDER RESPIRATORY (INHALATION) at 09:01

## 2024-10-10 RX ADMIN — CARVEDILOL 6.25 MG: 6.25 TABLET, FILM COATED ORAL at 17:01

## 2024-10-10 RX ADMIN — RISPERIDONE 2 MG: 2 TABLET, FILM COATED ORAL at 21:41

## 2024-10-10 RX ADMIN — SENNOSIDES AND DOCUSATE SODIUM 2 TABLET: 8.6; 5 TABLET ORAL at 21:41

## 2024-10-10 NOTE — NURSING NOTE
Pt guarded and intrusive but redirectable.  Good appetite and steady gait.  VSS.  Attended group.  Monitored for safety and support.

## 2024-10-10 NOTE — PLAN OF CARE
Problem: Alteration in Thoughts and Perception  Goal: Verbalize thoughts and feelings  Description: Interventions:  - Promote a nonjudgmental and trusting relationship with the patient through active listening and therapeutic communication  - Assess patient's level of functioning, behavior and potential for risk  - Engage patient in 1 on 1 interactions  - Encourage patient to express fears, feelings, frustrations, and discuss symptoms    - [place] patient to reality, help patient recognize reality-based thinking   - Administer medications as ordered and assess for potential side effects  - Provide the patient education related to the signs and symptoms of the illness and desired effects of prescribed medications  Outcome: Progressing     Problem: Risk for Self Injury/Neglect  Goal: Treatment Goal: Remain safe during length of stay, learn and adopt new coping skills, and be free of self-injurious ideation, impulses and acts at the time of discharge  Outcome: Progressing  Goal: Verbalize thoughts and feelings  Description: Interventions:  - Assess and re-assess patient's lethality and potential for self-injury  - Engage patient in 1:1 interactions, daily, for a minimum of 15 minutes  - Encourage patient to express feelings, fears, frustrations, hopes  - Establish rapport/trust with patient   Outcome: Progressing  Goal: Refrain from harming self  Description: Interventions:  - Monitor patient closely, per order  - Develop a trusting relationship  - Supervise medication ingestion, monitor effects and side effects   Outcome: Progressing  Goal: Attend and participate in unit activities, including therapeutic, recreational, and educational groups  Description: Interventions:  - Provide therapeutic and educational activities daily, encourage attendance and participation, and document same in the medical record  - Obtain collateral information, encourage visitation and family involvement in care   Outcome:  Progressing  Goal: Recognize maladaptive responses and adopt new coping mechanisms  Outcome: Progressing     Problem: Potential for Falls  Goal: Patient will remain free of falls  Description: INTERVENTIONS:  - Educate patient on patient safety including physical limitations  - Instruct patient to call for assistance with activity   - Consult OT/PT to assist with strengthening/mobility   - Keep Call bell within reach  - Keep bed low and locked with side rails adjusted as appropriate  - Keep care items and personal belongings within reach  - Initiate and maintain comfort rounds  - Offer Toileting every 2 Hours, in advance of need  - Initiate/Maintain bed and chair alarm  - Obtain necessary fall risk management equipment: walker, wheelchair  - Apply yellow socks and bracelet for high fall risk patients  - Patient moved to room near nurses station  Outcome: Progressing

## 2024-10-10 NOTE — PROGRESS NOTES
Progress Note - Behavioral Health   Name: Meli Nowak 59 y.o. female I MRN: [id number]  Unit/Bed#: [facility] 651-02 I Date of Admission: 7/23/2024   Date of Service: 10/10/2024 I Hospital Day: 79     Assessment & Plan  Schizoaffective disorder, bipolar type (HCC)  Progress towards goals  Awaiting CRR placement  Prozac is being tapered off  Increase Depakote to 1250 mg nightly, VPA level to be obtained on 10/12/2024  Two antipsychotics utilized secondary to multiple failures of monotherapy  Continue medications as prescribed  Clozaril 700 mg nightly for schizoaffective disorder, Clozaril level 10/7 was 711, last ANC on 10/7 was 5.10    Risperdal to 2 mg qhs for ongoing psychosis   Ativan 0.5 mg twice daily for anxiety and paranoia  Melatonin 3 mg nightly for insomnia  Prazosin 2 mg nightly for PTSD associated nightmares   trazodone 50 mg nightly for insomnia          Plan   Progress Toward Goals:   Meli is progressing towards goals of inpatient psychiatric treatment by continued medication compliance and is attending therapeutic modalities on the milieu. However, the patient continues to require inpatient psychiatric hospitalization for continued medication management and titration to optimize symptom reduction, improve sleep hygiene, and demonstrate adequate self-care.    Recommended Treatment: Treatment plan and medication changes discussed and per the attending physician the plan is:    1.Continue with group therapy, milieu therapy and occupational therapy  2.Behavioral Health checks every 7 minutes  3.Continue frequent safety checks and vitals per unit protocol  4.Continue with SLIM medical management as indicated  5.Continue with current medication regimen  6.Will review labs in the a.m.  7.Disposition Planning: Discharge planning and efforts remain ongoing    Behavioral Health Medications: all current active meds have been reviewed and continue current psychiatric medications.  Current Facility-Administered  Medications   Medication Dose Route Frequency Provider Last Rate    acetaminophen  650 mg Oral Q4H PRN Marie Ziegler, CRNP      acetaminophen  650 mg Oral Q4H PRN Marie Ziegler, CRNP      acetaminophen  975 mg Oral Q6H PRN Marie Ziegler, CRNP      aluminum-magnesium hydroxide-simethicone  30 mL Oral Q4H PRN Parris Bolanos, CRNP      Artificial Tears  1 drop Both Eyes Q6H PRN Dereje Banuelos MD      atorvastatin  10 mg Oral Daily Marie Ziegler, CRNP      bisacodyl  10 mg Oral Daily PRN Kristen Logan, CRNP      bisacodyl  10 mg Rectal Daily PRN Marie Ziegler, CRNP      carvedilol  6.25 mg Oral BID With Meals Marie Ziegler, CRNP      cloZAPine  700 mg Oral HS Marie Ziegler, CRNP      cyanocobalamin  1,000 mcg Oral Daily Marie Ziegler, CRNP      divalproex sodium  1,250 mg Oral HS Marie Ziegler, CRNP      famotidine  20 mg Oral BID Shan Fitzgerald,       fluticasone  1 puff Inhalation Daily Marie Ziegler, CRNP      hydrOXYzine HCL  25 mg Oral Q6H PRN Max 4/day Marie Ziegler, CRNP      hydrOXYzine HCL  50 mg Oral Q6H PRN Max 4/day Marie Ziegler, CRNP      ipratropium-albuterol  3 mL Nebulization Q4H PRN Darin Lawton MD      LORazepam  0.5 mg Oral BID Marie Ziegler, CRNP      Or    LORazepam  0.5 mg Intramuscular BID Marie Ziegelr, CRNP      LORazepam  1 mg Intramuscular Q6H PRN Max 3/day Marie Ziegler, CRNP      LORazepam  1 mg Oral Q6H PRN Max 3/day Marie Ziegler, CRNP      melatonin  3 mg Oral HS Marie Ziegler, CRNP      mirtazapine  7.5 mg Oral HS PRN Marie Ziegler, CRNP      Multivitamin  15 mL Oral Daily Marie Ziegler, CRNP      nicotine  1 patch Transdermal Daily Marie Ziegler, CRNP      OLANZapine  2.5 mg Oral Q6H PRN Dereje Banuelos MD      Or    OLANZapine  2.5 mg Intramuscular Q6H PRN Dereje Banuelos MD      OLANZapine  5 mg Oral Q6H PRN Dereje Banuelos MD      Or    OLANZapine  5 mg Intramuscular Q6H PRN Dereje Banuelos MD      ondansetron  4 mg Oral Q6H  PAVAN Lawton MD      polyethylene glycol  17 g Oral Daily Kristenabdoulaye Tomasjimmy Logan, JOSE ELIAS      polyethylene glycol  17 g Oral Daily PRN JOSE ELIAS Figueroa      prazosin  2 mg Oral HS Marie Ziegler, JOSE ELIAS      risperiDONE  2 mg Oral HS JOSE ELIAS Figueroa      senna-docusate sodium  2 tablet Oral HS Rehana Borrego PA-C      traZODone  50 mg Oral HS JOSE ELIAS Figueroa         Risks / Benefits of Treatment:  Risks, benefits, and possible side effects of medications explained to patient and patient verbalizes understanding and agreement for treatment.       Subjective    Patient was seen today for continuation of care, records reviewed and patient was discussed with the morning case review team.    Meli was seen today for psychiatric follow-up.  On assessment today, Meli was easily distractible and intrusive with peers.  Still illogical at times, patient is noted to be increasingly sexually preoccupied and flirtatious with males needing frequent redirection.  Clozaril optimized to 700 mg nightly to assist with symptoms, noted to be moderately effective.  Second antipsychotic Risperdal added to regimen and titrated to 2 mg nightly. Depakote also titrated on 10/9/2024, we will obtain VPA level on 10/12/2024.  No medication change be made at this time.  Tentative discharge pending CRR placement.     Meli denies acute suicidal/self-harm ideation/intent/plan upon direct inquiry at this time.  Meli remains behaviorally appropriate, no agitation or aggression noted on exam or in report.  Impulse control is intact.  Meli remains adherent to her current psychotropic medication regimen and denies any side effects from medications, as well as none noted on exam.    Psychiatric Review of Systems:  Behavior over the last 24 hours:  unchanged.   Sleep: good  Appetite: good  Medication side effects: none  ROS: no complaints, denies shortness of breath or chest pain and all other systems are negative for acute  changes        Objective    Vitals:  Vitals:    10/10/24 0758   BP: 152/72   Pulse: 91   Resp: 16   Temp: 97.5 °F (36.4 °C)   SpO2: 93%       Laboratory Results:  I have personally reviewed all pertinent laboratory/tests results.  Most Recent Labs:   Lab Results   Component Value Date    WBC 10.24 (H) 10/07/2024    RBC 4.13 10/07/2024    HGB 12.9 10/07/2024    HCT 37.8 10/07/2024     10/07/2024    RDW 14.7 10/07/2024    NEUTROABS 5.10 10/07/2024    SODIUM 138 09/30/2024    K 4.2 09/30/2024     09/30/2024    CO2 30 09/30/2024    BUN 16 09/30/2024    CREATININE 1.13 09/30/2024    GLUC 128 09/30/2024    GLUF 98 08/10/2024    CALCIUM 9.3 09/30/2024    AST 15 09/30/2024    ALT 17 09/30/2024    ALKPHOS 86 09/30/2024    TP 6.4 09/30/2024    ALB 3.7 09/30/2024    TBILI 0.28 09/30/2024    VALPROICTOT 26 (L) 09/30/2024    AMMONIA 32 07/03/2024    [id number] 2.000 07/24/2024    HGBA1C 6.4 (H) 05/03/2024     05/03/2024       Mental Status Evaluation:  Appearance:  dressed appropriately, adequate grooming   Behavior:  bizarre, guarded   Speech:  normal rate and volume   Mood:  less anxious, less depressed   Affect:  constricted   Thought Process:  illogical, perseverative, concrete   Thought Content:  Confucianism preoccupation, paranoid ideation, ruminating thoughts   Perceptual Disturbances: appears preoccupied, talks to self at times   Risk Potential: Suicidal ideation - None  Homicidal ideation - None  Potential for aggression - No   Memory:  recent and remote memory grossly intact   Sensorium  person, place, and time/date      Consciousness:  alert and awake   Attention: attention span and concentration are age appropriate   Insight:  limited   Judgment: limited   Gait/Station: normal gait/station   Motor Activity: no abnormal movements       Counseling / Coordination of Care:  Patient's progress reviewed with nursing staff.  Medications, treatment progress and treatment plan reviewed with  patient.  Supportive counseling provided to the patient.    This note was completed in part utilizing Dragon dictation Software. Grammatical, translation, syntax errors, random word insertions, spelling mistakes, and incomplete sentences may be an occasional consequence of this system secondary to software limitations with voice recognition, ambient noise, and hardware issues. If you have any questions or concerns about the content, text, or information contained within the body of this dictation, please contact the provider for clarification

## 2024-10-10 NOTE — TREATMENT TEAM
Pt attended group.  Bizarre, stairng and making signs of the cross.  Lacks insight.     10/10/24 1000   Activity/Group Checklist   Group Community meeting   Attendance Attended   Attendance Duration (min) 31-45   Interactions Disorganized interaction  (Anabaptism, making signs of crosss)   Affect/Mood Incongruent   Goals Achieved Identified feelings;Identified triggers;Identified relapse prevention strategies;Able to listen to others;Able to engage in interactions;Able to self-disclose;Able to recieve feedback

## 2024-10-10 NOTE — PROGRESS NOTES
10/10/24 0812   Team Meeting   Meeting Type Daily Rounds   Initial Conference Date 10/10/24   Next Conference Date 10/11/24   Team Members Present   Team Members Present Physician;Nurse;;   Physician Team Member Dr Banuelos, JAIR Goldman   Nursing Team Member Clarissa   Care Management Team Member Anum   Social Work Team Member Ayse   Patient/Family Present   Patient Present No   Patient's Family Present No     ICM came yesterday, flirtatious on unit, wearing gown backwards to show body, deny s/s, visible, held minipress for BP,

## 2024-10-11 PROCEDURE — 99232 SBSQ HOSP IP/OBS MODERATE 35: CPT | Performed by: STUDENT IN AN ORGANIZED HEALTH CARE EDUCATION/TRAINING PROGRAM

## 2024-10-11 RX ADMIN — LORAZEPAM 0.5 MG: 0.5 TABLET ORAL at 08:33

## 2024-10-11 RX ADMIN — FAMOTIDINE 20 MG: 20 TABLET ORAL at 17:08

## 2024-10-11 RX ADMIN — PRAZOSIN HYDROCHLORIDE 2 MG: 1 CAPSULE ORAL at 21:35

## 2024-10-11 RX ADMIN — ATORVASTATIN CALCIUM 10 MG: 10 TABLET, FILM COATED ORAL at 08:33

## 2024-10-11 RX ADMIN — FAMOTIDINE 20 MG: 20 TABLET ORAL at 08:33

## 2024-10-11 RX ADMIN — POLYETHYLENE GLYCOL 3350 17 G: 17 POWDER, FOR SOLUTION ORAL at 08:32

## 2024-10-11 RX ADMIN — CARVEDILOL 6.25 MG: 6.25 TABLET, FILM COATED ORAL at 17:08

## 2024-10-11 RX ADMIN — MELATONIN TAB 3 MG 3 MG: 3 TAB at 21:35

## 2024-10-11 RX ADMIN — CLOZAPINE 700 MG: 200 TABLET ORAL at 21:34

## 2024-10-11 RX ADMIN — NICOTINE 1 PATCH: 7 PATCH, EXTENDED RELEASE TRANSDERMAL at 08:32

## 2024-10-11 RX ADMIN — DIVALPROEX SODIUM 1250 MG: 500 TABLET, EXTENDED RELEASE ORAL at 21:35

## 2024-10-11 RX ADMIN — LORAZEPAM 0.5 MG: 0.5 TABLET ORAL at 17:08

## 2024-10-11 RX ADMIN — RISPERIDONE 2 MG: 2 TABLET, FILM COATED ORAL at 21:34

## 2024-10-11 RX ADMIN — FLUTICASONE FUROATE 1 PUFF: 100 POWDER RESPIRATORY (INHALATION) at 08:33

## 2024-10-11 RX ADMIN — CYANOCOBALAMIN TAB 1000 MCG 1000 MCG: 1000 TAB at 08:33

## 2024-10-11 RX ADMIN — SENNOSIDES AND DOCUSATE SODIUM 2 TABLET: 8.6; 5 TABLET ORAL at 21:34

## 2024-10-11 RX ADMIN — TRAZODONE HYDROCHLORIDE 50 MG: 50 TABLET ORAL at 21:34

## 2024-10-11 RX ADMIN — CARVEDILOL 6.25 MG: 6.25 TABLET, FILM COATED ORAL at 08:11

## 2024-10-11 RX ADMIN — Medication 15 ML: at 08:32

## 2024-10-11 NOTE — ASSESSMENT & PLAN NOTE
Progress towards goals  Pending CRR placement  Depakote to 1250 mg nightly, VPA level to be obtained on 10/12/2024  Two antipsychotics utilized secondary to multiple failures of monotherapy  Continue medications as prescribed  Clozaril 700 mg nightly for schizoaffective disorder, Clozaril level 10/7 was 711, last ANC on 10/7 was 5.10    Risperdal to 2 mg qhs for ongoing psychosis   Ativan 0.5 mg twice daily for anxiety and paranoia  Melatonin 3 mg nightly for insomnia  Prazosin 2 mg nightly for PTSD associated nightmares   trazodone 50 mg nightly for insomnia

## 2024-10-11 NOTE — PROGRESS NOTES
10/11/24 0738   Team Meeting   Meeting Type Daily Rounds   Initial Conference Date 10/11/24   Next Conference Date 10/14/24   Team Members Present   Team Members Present Physician;Nurse;;   Physician Team Member Dr Banuelos, JAIR Goldman   Nursing Team Member Dona   Care Management Team Member Anum   Social Work Team Member Ayse   Patient/Family Present   Patient Present No   Patient's Family Present No     Bizarre, disorganized, responding to internal stimuli, fixated on male peers, waiting on CRR.

## 2024-10-11 NOTE — NURSING NOTE
Patient is isolative to self in room, calm and pleasant on approach. She endorses anxiety and depression and denies all other psych s/s. She is medication and meal compliant. Safety checks ongoing.

## 2024-10-11 NOTE — NURSING NOTE
Patient is alert and oriented able to verbalize her needs. C/O indigestion, PRN maalox given at 1805. She is withdrawn to her room, visible for meal and snack only. She is medication and meal compliant. She endorses anxiety and denies all other psych s/s. Safety check maintained.

## 2024-10-11 NOTE — PROGRESS NOTES
Progress Note - Behavioral Health   Name: Meli Nowak 59 y.o. female I MRN: [id number]  Unit/Bed#: [facility] 6[facility]-02 I Date of Admission: 7/23/2024   Date of Service: 10/11/2024 I Hospital Day: 80     Assessment & Plan  Schizoaffective disorder, bipolar type (HCC)  Progress towards goals  Pending CRR placement  Depakote to 1250 mg nightly, VPA level to be obtained on 10/12/2024  Two antipsychotics utilized secondary to multiple failures of monotherapy  Continue medications as prescribed  Clozaril 700 mg nightly for schizoaffective disorder, Clozaril level 10/7 was 711, last ANC on 10/7 was 5.10    Risperdal to 2 mg qhs for ongoing psychosis   Ativan 0.5 mg twice daily for anxiety and paranoia  Melatonin 3 mg nightly for insomnia  Prazosin 2 mg nightly for PTSD associated nightmares   trazodone 50 mg nightly for insomnia          Plan   Progress Toward Goals:   Meli is progressing towards goals of inpatient psychiatric treatment by continued medication compliance and is attending therapeutic modalities on the milieu. However, the patient continues to require inpatient psychiatric hospitalization for continued medication management and titration to optimize symptom reduction, improve sleep hygiene, and demonstrate adequate self-care.    Recommended Treatment: Treatment plan and medication changes discussed and per the attending physician the plan is:    1.Continue with group therapy, milieu therapy and occupational therapy  2.Behavioral Health checks every 7 minutes  3.Continue frequent safety checks and vitals per unit protocol  4.Continue with SLIM medical management as indicated  5.Continue with current medication regimen  6.Will review labs in the a.m.  7.Disposition Planning: Discharge planning and efforts remain ongoing    Behavioral Health Medications: all current active meds have been reviewed and continue current psychiatric medications.  Current Facility-Administered Medications   Medication Dose Route  Frequency Provider Last Rate    acetaminophen  650 mg Oral Q4H PRN Marie Ziegler, CRNP      acetaminophen  650 mg Oral Q4H PRN Marie Ziegler, CRNP      acetaminophen  975 mg Oral Q6H PRN Marie Ziegler, CRNP      aluminum-magnesium hydroxide-simethicone  30 mL Oral Q4H PRN Parris Bolanos, CRNP      Artificial Tears  1 drop Both Eyes Q6H PRN Dereje Banuelos MD      atorvastatin  10 mg Oral Daily Marie Ziegler, CRNP      bisacodyl  10 mg Oral Daily PRN Kristen Logan, CRNP      bisacodyl  10 mg Rectal Daily PRN Marie Ziegler, CRNP      carvedilol  6.25 mg Oral BID With Meals Marie Ziegler, CRNP      cloZAPine  700 mg Oral HS Marie Ziegler, CRNP      cyanocobalamin  1,000 mcg Oral Daily Marie Ziegler, CRNP      divalproex sodium  1,250 mg Oral HS Marie Ziegler, CRNP      famotidine  20 mg Oral BID Shan Fitzgerald,       fluticasone  1 puff Inhalation Daily Marie Ziegler, CRNP      hydrOXYzine HCL  25 mg Oral Q6H PRN Max 4/day Marie Ziegler, CRNP      hydrOXYzine HCL  50 mg Oral Q6H PRN Max 4/day Marie Ziegler, CRNP      ipratropium-albuterol  3 mL Nebulization Q4H PRN Darin Latwon MD      LORazepam  0.5 mg Oral BID Marie Ziegler, CRNP      Or    LORazepam  0.5 mg Intramuscular BID Marie Ziegler, CRNP      LORazepam  1 mg Intramuscular Q6H PRN Max 3/day Marie Ziegler, CRNP      LORazepam  1 mg Oral Q6H PRN Max 3/day Marie Ziegler, CRNP      melatonin  3 mg Oral HS Marie Ziegler, CRNP      mirtazapine  7.5 mg Oral HS PRN Marie Ziegler, CRNP      Multivitamin  15 mL Oral Daily Marie Ziegler, CRNP      nicotine  1 patch Transdermal Daily Marie Ziegler, CRNP      OLANZapine  2.5 mg Oral Q6H PRN Dereje Banuelos MD      Or    OLANZapine  2.5 mg Intramuscular Q6H PRN Dereje Banuelos MD      OLANZapine  5 mg Oral Q6H PRN Dereje Banuelos MD      Or    OLANZapine  5 mg Intramuscular Q6H PRN Dereje Baneulos MD      ondansetron  4 mg Oral Q6H PRN Darin Lawton MD       "polyethylene glycol  17 g Oral Daily Kristen Padillajesseemaynor, JOSE ELIAS      polyethylene glycol  17 g Oral Daily PRN Marie Ziegler, JOSE ELIAS      prazosin  2 mg Oral HS JOSE ELIAS Figueroa      risperiDONE  2 mg Oral HS JOSE ELIAS Figueroa      senna-docusate sodium  2 tablet Oral HS Rehana Borrego PA-C      traZODone  50 mg Oral HS JOSE ELIAS Figueroa         Risks / Benefits of Treatment:  Risks, benefits, and possible side effects of medications explained to patient and patient verbalizes understanding and agreement for treatment.       Subjective    Patient was seen today for continuation of care, records reviewed and patient was discussed with the morning case review team.    Meli was seen today for psychiatric follow-up.  On assessment today, Meli was seen lying in bed. Stating \"I'm tired\", patient is more isolative and withdrawn today. Internal preoccupation reported by nursing staff,, patient must be redirected as she is intrusive and preoccupied with peers.  Depakote increased to 1250 mg nightly, VPA level to be obtained tomorrow.  We will continue with titration to assist with symptoms as well as  Clozaril which is currently 700 mg nightly. No medication changes to be made at this time. Tentative discharge pending CRR placement.     Meli denies acute suicidal/self-harm ideation/intent/plan upon direct inquiry at this time.  Meli remains behaviorally appropriate, no agitation or aggression noted on exam or in report.  Impulse control is intact.  Meli remains adherent to her current psychotropic medication regimen and denies any side effects from medications, as well as none noted on exam.    Psychiatric Review of Systems:  Behavior over the last 24 hours:  unchanged.   Sleep: good  Appetite: good  Medication side effects: none  ROS: no complaints, denies shortness of breath or chest pain and all other systems are negative for acute changes        Objective    Vitals:  Vitals:    10/11/24 0753   BP: " 119/55   Pulse: 77   Resp: 18   Temp: 98 °F (36.7 °C)   SpO2: 96%       Laboratory Results:  I have personally reviewed all pertinent laboratory/tests results.  Most Recent Labs:   Lab Results   Component Value Date    WBC 10.24 (H) 10/07/2024    RBC 4.13 10/07/2024    HGB 12.9 10/07/2024    HCT 37.8 10/07/2024     10/07/2024    RDW 14.7 10/07/2024    NEUTROABS 5.10 10/07/2024    SODIUM 138 09/30/2024    K 4.2 09/30/2024     09/30/2024    CO2 30 09/30/2024    BUN 16 09/30/2024    CREATININE 1.13 09/30/2024    GLUC 128 09/30/2024    GLUF 98 08/10/2024    CALCIUM 9.3 09/30/2024    AST 15 09/30/2024    ALT 17 09/30/2024    ALKPHOS 86 09/30/2024    TP 6.4 09/30/2024    ALB 3.7 09/30/2024    TBILI 0.28 09/30/2024    VALPROICTOT 26 (L) 09/30/2024    AMMONIA 32 07/03/2024    [id number] 2.000 07/24/2024    HGBA1C 6.4 (H) 05/03/2024     05/03/2024       Mental Status Evaluation:  Appearance:  disheveled, marginal hygiene   Behavior:  cooperative, bizarre, guarded   Speech:  normal rate and volume   Mood:  less anxious, less depressed   Affect:  constricted   Thought Process:  illogical, perseverative, concrete   Thought Content:  Orthodox preoccupation, paranoid ideation   Perceptual Disturbances: appears preoccupied, talks to self at times   Risk Potential: Suicidal ideation - None  Homicidal ideation - None  Potential for aggression - No   Memory:  recent and remote memory grossly intact   Sensorium  person, place, and time/date      Consciousness:  alert and awake   Attention: attention span and concentration are age appropriate   Insight:  limited   Judgment: limited   Gait/Station: normal gait/station   Motor Activity: no abnormal movements       Counseling / Coordination of Care:  Patient's progress reviewed with nursing staff.  Medications, treatment progress and treatment plan reviewed with patient.  Supportive counseling provided to the patient.    This note was completed in part utilizing  Dragon dictation Software. Grammatical, translation, syntax errors, random word insertions, spelling mistakes, and incomplete sentences may be an occasional consequence of this system secondary to software limitations with voice recognition, ambient noise, and hardware issues. If you have any questions or concerns about the content, text, or information contained within the body of this dictation, please contact the provider for clarification

## 2024-10-11 NOTE — NURSING NOTE
Pt pleasant and med-compliant with good appetite and steady gait.  VSS.  Pt did not attend group.  Pt disorganized and intrusive but redirectable.  Monitored for safety and support.

## 2024-10-12 LAB
ALBUMIN SERPL BCG-MCNC: 3.9 G/DL (ref 3.5–5)
ALP SERPL-CCNC: 84 U/L (ref 34–104)
ALT SERPL W P-5'-P-CCNC: 12 U/L (ref 7–52)
ANION GAP SERPL CALCULATED.3IONS-SCNC: 9 MMOL/L (ref 4–13)
AST SERPL W P-5'-P-CCNC: 12 U/L (ref 13–39)
BILIRUB SERPL-MCNC: 0.26 MG/DL (ref 0.2–1)
BUN SERPL-MCNC: 18 MG/DL (ref 5–25)
CALCIUM SERPL-MCNC: 9.2 MG/DL (ref 8.4–10.2)
CHLORIDE SERPL-SCNC: 100 MMOL/L (ref 96–108)
CO2 SERPL-SCNC: 29 MMOL/L (ref 21–32)
CREAT SERPL-MCNC: 0.97 MG/DL (ref 0.6–1.3)
GFR SERPL CREATININE-BSD FRML MDRD: 65 ML/MIN/1.73SQ M
GLUCOSE SERPL-MCNC: 146 MG/DL (ref 65–140)
POTASSIUM SERPL-SCNC: 4.1 MMOL/L (ref 3.5–5.3)
PROT SERPL-MCNC: 6.7 G/DL (ref 6.4–8.4)
SODIUM SERPL-SCNC: 138 MMOL/L (ref 135–147)
VALPROATE SERPL-MCNC: 54 UG/ML (ref 50–100)

## 2024-10-12 PROCEDURE — 86803 HEPATITIS C AB TEST: CPT | Performed by: INTERNAL MEDICINE

## 2024-10-12 PROCEDURE — 80053 COMPREHEN METABOLIC PANEL: CPT

## 2024-10-12 PROCEDURE — 80164 ASSAY DIPROPYLACETIC ACD TOT: CPT

## 2024-10-12 PROCEDURE — 87389 HIV-1 AG W/HIV-1&-2 AB AG IA: CPT | Performed by: INTERNAL MEDICINE

## 2024-10-12 PROCEDURE — 99232 SBSQ HOSP IP/OBS MODERATE 35: CPT

## 2024-10-12 RX ADMIN — ATORVASTATIN CALCIUM 10 MG: 10 TABLET, FILM COATED ORAL at 08:25

## 2024-10-12 RX ADMIN — DIVALPROEX SODIUM 1250 MG: 500 TABLET, EXTENDED RELEASE ORAL at 21:14

## 2024-10-12 RX ADMIN — LORAZEPAM 0.5 MG: 0.5 TABLET ORAL at 08:25

## 2024-10-12 RX ADMIN — TRAZODONE HYDROCHLORIDE 50 MG: 50 TABLET ORAL at 21:14

## 2024-10-12 RX ADMIN — POLYETHYLENE GLYCOL 3350 17 G: 17 POWDER, FOR SOLUTION ORAL at 08:26

## 2024-10-12 RX ADMIN — CARVEDILOL 6.25 MG: 6.25 TABLET, FILM COATED ORAL at 17:04

## 2024-10-12 RX ADMIN — FAMOTIDINE 20 MG: 20 TABLET ORAL at 08:25

## 2024-10-12 RX ADMIN — PRAZOSIN HYDROCHLORIDE 2 MG: 1 CAPSULE ORAL at 21:14

## 2024-10-12 RX ADMIN — SENNOSIDES AND DOCUSATE SODIUM 2 TABLET: 8.6; 5 TABLET ORAL at 21:14

## 2024-10-12 RX ADMIN — RISPERIDONE 2 MG: 2 TABLET, FILM COATED ORAL at 21:14

## 2024-10-12 RX ADMIN — FLUTICASONE FUROATE 1 PUFF: 100 POWDER RESPIRATORY (INHALATION) at 08:27

## 2024-10-12 RX ADMIN — CYANOCOBALAMIN TAB 1000 MCG 1000 MCG: 1000 TAB at 08:25

## 2024-10-12 RX ADMIN — Medication 15 ML: at 08:25

## 2024-10-12 RX ADMIN — NICOTINE 1 PATCH: 7 PATCH, EXTENDED RELEASE TRANSDERMAL at 08:31

## 2024-10-12 RX ADMIN — CARVEDILOL 6.25 MG: 6.25 TABLET, FILM COATED ORAL at 08:25

## 2024-10-12 RX ADMIN — MELATONIN TAB 3 MG 3 MG: 3 TAB at 21:14

## 2024-10-12 RX ADMIN — LORAZEPAM 0.5 MG: 0.5 TABLET ORAL at 17:04

## 2024-10-12 RX ADMIN — FAMOTIDINE 20 MG: 20 TABLET ORAL at 17:04

## 2024-10-12 RX ADMIN — CLOZAPINE 700 MG: 200 TABLET ORAL at 21:14

## 2024-10-12 NOTE — PROGRESS NOTES
Progress Note - Behavioral Health   Name: Meli Nowak 59 y.o. female I MRN: [id number]  Unit/Bed#: [facility] 651-02 I Date of Admission: 7/23/2024   Date of Service: 10/12/2024 I Hospital Day: 81     Assessment & Plan  Schizoaffective disorder, bipolar type (HCC)  Progress towards goals  Pending CRR placement  Depakote to 1250 mg nightly, VPA level to be obtained on 10/12/2024  Two antipsychotics utilized secondary to multiple failures of monotherapy  Continue medications as prescribed  Clozaril 700 mg nightly for schizoaffective disorder, Clozaril level 10/7 was 711, last ANC on 10/7 was 5.10    Risperdal to 2 mg qhs for ongoing psychosis   Ativan 0.5 mg twice daily for anxiety and paranoia  Melatonin 3 mg nightly for insomnia  Prazosin 2 mg nightly for PTSD associated nightmares   trazodone 50 mg nightly for insomnia          Plan   Progress Toward Goals:   Meli is progressing towards goals of inpatient psychiatric treatment by continued medication compliance and is attending therapeutic modalities on the milieu. However, the patient continues to require inpatient psychiatric hospitalization for continued medication management and titration to optimize symptom reduction, improve sleep hygiene, and demonstrate adequate self-care.    Recommended Treatment: Treatment plan and medication changes discussed and per the attending physician the plan is:    1.Continue with group therapy, milieu therapy and occupational therapy  2.Behavioral Health checks every 7 minutes  3.Continue frequent safety checks and vitals per unit protocol  4.Continue with SLIM medical management as indicated  5.Continue with current medication regimen  6.Will review labs in the a.m.  7.Disposition Planning: Discharge planning and efforts remain ongoing    Behavioral Health Medications: all current active meds have been reviewed and continue current psychiatric medications.  Current Facility-Administered Medications   Medication Dose Route  Frequency Provider Last Rate    acetaminophen  650 mg Oral Q4H PRN Marie Ziegler, CRNP      acetaminophen  650 mg Oral Q4H PRN Marie Ziegler, CRNP      acetaminophen  975 mg Oral Q6H PRN Marie Ziegler, CRNP      aluminum-magnesium hydroxide-simethicone  30 mL Oral Q4H PRN Parris Bolanos, CRNP      Artificial Tears  1 drop Both Eyes Q6H PRN Dereje Banuelos MD      atorvastatin  10 mg Oral Daily Marie Ziegler, CRNP      bisacodyl  10 mg Oral Daily PRN Kristen Logan, CRNP      bisacodyl  10 mg Rectal Daily PRN Marie Ziegler, CRNP      carvedilol  6.25 mg Oral BID With Meals Marie Ziegler, CRNP      cloZAPine  700 mg Oral HS Marie Ziegler, CRNP      cyanocobalamin  1,000 mcg Oral Daily Marie Ziegler, CRNP      divalproex sodium  1,250 mg Oral HS Marie Ziegler, CRNP      famotidine  20 mg Oral BID hSan Fitzgerald,       fluticasone  1 puff Inhalation Daily Marie Ziegler, CRNP      hydrOXYzine HCL  25 mg Oral Q6H PRN Max 4/day Marie Ziegler, CRNP      hydrOXYzine HCL  50 mg Oral Q6H PRN Max 4/day Marie Ziegler, CRNP      ipratropium-albuterol  3 mL Nebulization Q4H PRN Darin Lawton MD      LORazepam  0.5 mg Oral BID Marie Ziegler, CRNP      Or    LORazepam  0.5 mg Intramuscular BID Marie Ziegler, CRNP      LORazepam  1 mg Intramuscular Q6H PRN Max 3/day Marie Ziegler, CRNP      LORazepam  1 mg Oral Q6H PRN Max 3/day Marie Ziegler, CRNP      melatonin  3 mg Oral HS Marie Ziegler, CRNP      mirtazapine  7.5 mg Oral HS PRN Marie Ziegler, CRNP      Multivitamin  15 mL Oral Daily Marie Ziegler, CRNP      nicotine  1 patch Transdermal Daily Marie Ziegler, CRNP      OLANZapine  2.5 mg Oral Q6H PRN Dereje Banuelos MD      Or    OLANZapine  2.5 mg Intramuscular Q6H PRN Dereje Banuelos MD      OLANZapine  5 mg Oral Q6H PRN Dereje Banuelos MD      Or    OLANZapine  5 mg Intramuscular Q6H PRN Dereje Banuelos MD      ondansetron  4 mg Oral Q6H PRN Darin Lawton MD       "polyethylene glycol  17 g Oral Daily Kristenabdoulaye Ludwig Doreen, JOSE ELIAS      polyethylene glycol  17 g Oral Daily PRN Marie Ziegler, JOSE ELIAS      prazosin  2 mg Oral HS JOSE ELIAS Figueroa      risperiDONE  2 mg Oral HS JOSE ELIAS Figueroa      senna-docusate sodium  2 tablet Oral HS Rehana Borrego PA-C      traZODone  50 mg Oral HS JOSE ELIAS Figueroa         Risks / Benefits of Treatment:  Risks, benefits, and possible side effects of medications explained to patient and patient verbalizes understanding and agreement for treatment.       Subjective    Patient was seen today for continuation of care, records reviewed and patient was discussed with the morning case review team.    Meli was seen today for psychiatric follow-up.  On assessment today, Meli was seen in the day room.  Still bizarre at times, patient remains religiously preoccupied and states that \" I would like a boyfriend\", no current internal preoccupation noted.  She does also deny any auditory or visual hallucinations.  Continuous redirection needed for intrusive behavior, Depakote recently increased to 1250 mg nightly with VPA level to be obtained this evening.  Clozaril also continued at 700 mg daily, weekly labs obtained on Monday with last Clozaril level at 711 on 10/8/2024.  Moderate affectivity noted, second antipsychotic Risperdal added on 10/3/2024 and titrated to assist with symptoms.  2 antipsychotics utilized secondary to multiple failures of monotherapy.  Patient making slow improvements towards goals.  We will continue to assess for medication adjustments to assist with symptoms.    Meli denies acute suicidal/self-harm ideation/intent/plan upon direct inquiry at this time.  Meli remains behaviorally appropriate, no agitation or aggression noted on exam or in report.  Impulse control is intact.  Meli remains adherent to her current psychotropic medication regimen and denies any side effects from medications, as well as none noted on " exam.    Psychiatric Review of Systems:  Behavior over the last 24 hours:  unchanged.   Sleep: good  Appetite: good  Medication side effects: none  ROS: no complaints, denies shortness of breath or chest pain and all other systems are negative for acute changes        Objective    Vitals:  Vitals:    10/12/24 0736   BP: 122/78   Pulse: 76   Resp: 18   Temp: 98.8 °F (37.1 °C)   SpO2: 96%       Laboratory Results:  I have personally reviewed all pertinent laboratory/tests results.  Most Recent Labs:   Lab Results   Component Value Date    WBC 10.24 (H) 10/07/2024    RBC 4.13 10/07/2024    HGB 12.9 10/07/2024    HCT 37.8 10/07/2024     10/07/2024    RDW 14.7 10/07/2024    NEUTROABS 5.10 10/07/2024    SODIUM 138 09/30/2024    K 4.2 09/30/2024     09/30/2024    CO2 30 09/30/2024    BUN 16 09/30/2024    CREATININE 1.13 09/30/2024    GLUC 128 09/30/2024    GLUF 98 08/10/2024    CALCIUM 9.3 09/30/2024    AST 15 09/30/2024    ALT 17 09/30/2024    ALKPHOS 86 09/30/2024    TP 6.4 09/30/2024    ALB 3.7 09/30/2024    TBILI 0.28 09/30/2024    VALPROICTOT 26 (L) 09/30/2024    AMMONIA 32 07/03/2024    BMH5WHKPEURN 2.000 07/24/2024    HGBA1C 6.4 (H) 05/03/2024     05/03/2024       Mental Status Evaluation:  Appearance:  disheveled, marginal hygiene   Behavior:  cooperative, bizarre, guarded   Speech:  normal rate and volume   Mood:  less anxious, less depressed   Affect:  constricted   Thought Process:  illogical, perseverative, concrete   Thought Content:  Denominational preoccupation, paranoid ideation, negative thoughts, ruminating thoughts   Perceptual Disturbances: chronic, denies when asked, but talks to self at times   Risk Potential: Suicidal ideation - None  Homicidal ideation - None  Potential for aggression - No   Memory:  recent and remote memory grossly intact   Sensorium  person, place, and time/date      Consciousness:  alert and awake   Attention: attention span and concentration are age appropriate    Insight:  limited   Judgment: limited   Gait/Station: normal gait/station   Motor Activity: no abnormal movements       Counseling / Coordination of Care:  Patient's progress reviewed with nursing staff.  Medications, treatment progress and treatment plan reviewed with patient.  Supportive counseling provided to the patient.    This note was completed in part utilizing Dragon dictation Software. Grammatical, translation, syntax errors, random word insertions, spelling mistakes, and incomplete sentences may be an occasional consequence of this system secondary to software limitations with voice recognition, ambient noise, and hardware issues. If you have any questions or concerns about the content, text, or information contained within the body of this dictation, please contact the provider for clarification

## 2024-10-12 NOTE — PLAN OF CARE
Problem: Alteration in Thoughts and Perception  Goal: Treatment Goal: Gain control of psychotic behaviors/thinking, reduce/eliminate presenting symptoms and demonstrate improved reality functioning upon discharge  Outcome: Progressing     Problem: Alteration in Thoughts and Perception  Goal: Verbalize thoughts and feelings  Description: Interventions:  - Promote a nonjudgmental and trusting relationship with the patient through active listening and therapeutic communication  - Assess patient's level of functioning, behavior and potential for risk  - Engage patient in 1 on 1 interactions  - Encourage patient to express fears, feelings, frustrations, and discuss symptoms    - [place] patient to reality, help patient recognize reality-based thinking   - Administer medications as ordered and assess for potential side effects  - Provide the patient education related to the signs and symptoms of the illness and desired effects of prescribed medications  Outcome: Progressing

## 2024-10-13 LAB
HCV AB SER QL: NORMAL
HIV 1+2 AB+HIV1 P24 AG SERPL QL IA: NORMAL
HIV 2 AB SERPL QL IA: NORMAL
HIV1 AB SERPL QL IA: NORMAL
HIV1 P24 AG SERPL QL IA: NORMAL

## 2024-10-13 PROCEDURE — 99232 SBSQ HOSP IP/OBS MODERATE 35: CPT

## 2024-10-13 RX ORDER — VALPROIC ACID 250 MG/5ML
1250 SOLUTION ORAL
Status: DISCONTINUED | OUTPATIENT
Start: 2024-10-13 | End: 2024-11-20

## 2024-10-13 RX ADMIN — LORAZEPAM 0.5 MG: 0.5 TABLET ORAL at 17:06

## 2024-10-13 RX ADMIN — ACETAMINOPHEN 975 MG: 325 TABLET, FILM COATED ORAL at 17:50

## 2024-10-13 RX ADMIN — ATORVASTATIN CALCIUM 10 MG: 10 TABLET, FILM COATED ORAL at 08:11

## 2024-10-13 RX ADMIN — RISPERIDONE 2 MG: 2 TABLET, FILM COATED ORAL at 21:26

## 2024-10-13 RX ADMIN — Medication 15 ML: at 08:16

## 2024-10-13 RX ADMIN — CYANOCOBALAMIN TAB 1000 MCG 1000 MCG: 1000 TAB at 08:11

## 2024-10-13 RX ADMIN — NICOTINE 1 PATCH: 7 PATCH, EXTENDED RELEASE TRANSDERMAL at 08:19

## 2024-10-13 RX ADMIN — CLOZAPINE 700 MG: 200 TABLET ORAL at 21:26

## 2024-10-13 RX ADMIN — MELATONIN TAB 3 MG 3 MG: 3 TAB at 21:26

## 2024-10-13 RX ADMIN — FLUTICASONE FUROATE 1 PUFF: 100 POWDER RESPIRATORY (INHALATION) at 08:22

## 2024-10-13 RX ADMIN — FAMOTIDINE 20 MG: 20 TABLET ORAL at 08:11

## 2024-10-13 RX ADMIN — VALPROIC ACID 1250 MG: 500 SOLUTION ORAL at 21:26

## 2024-10-13 RX ADMIN — POLYETHYLENE GLYCOL 3350 17 G: 17 POWDER, FOR SOLUTION ORAL at 08:11

## 2024-10-13 RX ADMIN — FAMOTIDINE 20 MG: 20 TABLET ORAL at 17:06

## 2024-10-13 RX ADMIN — LORAZEPAM 0.5 MG: 0.5 TABLET ORAL at 08:11

## 2024-10-13 RX ADMIN — SENNOSIDES AND DOCUSATE SODIUM 2 TABLET: 8.6; 5 TABLET ORAL at 21:26

## 2024-10-13 RX ADMIN — CARVEDILOL 6.25 MG: 6.25 TABLET, FILM COATED ORAL at 08:11

## 2024-10-13 RX ADMIN — CARVEDILOL 6.25 MG: 6.25 TABLET, FILM COATED ORAL at 17:06

## 2024-10-13 RX ADMIN — TRAZODONE HYDROCHLORIDE 50 MG: 50 TABLET ORAL at 21:26

## 2024-10-13 NOTE — NURSING NOTE
Patient was withdrawn to her room but came out for breakfast. Denies all psych s/s. No behaviors noted. No c/o pain.Had 100% for breakfast. Took her AM medications. Safety checks ongoing.

## 2024-10-13 NOTE — NURSING NOTE
Pt cooperative with bizarre behaviors on occasion. Present in milieu interacting with peers. Redirected on interaction with meal peers and appropriate conversations. Medication and meal compliant. Denies SI/HI. Q 7 minute checks maintained.

## 2024-10-13 NOTE — TREATMENT TEAM
10/13/24 3103   Pain Assessment   Pain Assessment Tool 0-10   Pain Score 8   Pain Location/Orientation Location: Arm;Orientation: Right   Pain Onset/Description Onset: Gradual   Effect of Pain on Daily Activities [facility]   Hospital Pain Intervention(s) Medication (See MAR)     Prn Tylenol 975 mg given for right arm pain 8/10.

## 2024-10-13 NOTE — TREATMENT TEAM
10/13/24 3054   Pain Assessment Post Intervention   Pain Assessment Tool Used: 0-10   Post Intervention Pain Score 6   Post Intervention Pain Location/Orientation Orientation: Right;Location: Arm   Post Intervention POSS 1     PRN Tylenol semi effective patient laying in bed listening to music

## 2024-10-13 NOTE — NURSING NOTE
Pt cooperative, bizarre at times and intrusive towards peers. Monitored on unit with certain interactions with males due to inappropriate conversations. Medication and meal compliant. Denies SI/HI.Q 7 minute checks maintained.

## 2024-10-13 NOTE — PLAN OF CARE
Problem: Prexisting or High Potential for Compromised Skin Integrity  Goal: Skin integrity is maintained or improved  Description: INTERVENTIONS:  - Identify patients at risk for skin breakdown  - Assess and monitor skin integrity  - Assess and monitor nutrition and hydration status  - Monitor labs   - Assess for incontinence   - Turn and reposition patient  - Assist with mobility/ambulation  - Relieve pressure over bony prominences  - Avoid friction and shearing  - Provide appropriate hygiene as needed including keeping skin clean and dry  - Evaluate need for skin moisturizer/barrier cream  - Collaborate with interdisciplinary team   - Patient/family teaching  - Consider wound care consult   Outcome: Progressing     Problem: Alteration in Thoughts and Perception  Goal: Treatment Goal: Gain control of psychotic behaviors/thinking, reduce/eliminate presenting symptoms and demonstrate improved reality functioning upon discharge  Outcome: Progressing  Goal: Verbalize thoughts and feelings  Description: Interventions:  - Promote a nonjudgmental and trusting relationship with the patient through active listening and therapeutic communication  - Assess patient's level of functioning, behavior and potential for risk  - Engage patient in 1 on 1 interactions  - Encourage patient to express fears, feelings, frustrations, and discuss symptoms    - [place] patient to reality, help patient recognize reality-based thinking   - Administer medications as ordered and assess for potential side effects  - Provide the patient education related to the signs and symptoms of the illness and desired effects of prescribed medications  Outcome: Progressing  Goal: Refrain from acting on delusional thinking/internal stimuli  Description: Interventions:  - Monitor patient closely, per order   - Utilize least restrictive measures   - Set reasonable limits, give positive feedback for acceptable   - Administer medications as ordered and monitor  of potential side effects  Outcome: Progressing  Goal: Agree to be compliant with medication regime, as prescribed and report medication side effects  Description: Interventions:  - Offer appropriate PRN medication and supervise ingestion; conduct AIMS, as needed   Outcome: Progressing  Goal: Recognize dysfunctional thoughts, communicate reality-based thoughts at the time of discharge  Description: Interventions:  - Provide medication and psycho-education to assist patient in compliance and developing insight into his/her illness   Outcome: Progressing     Problem: Risk for Self Injury/Neglect  Goal: Treatment Goal: Remain safe during length of stay, learn and adopt new coping skills, and be free of self-injurious ideation, impulses and acts at the time of discharge  Outcome: Progressing  Goal: Verbalize thoughts and feelings  Description: Interventions:  - Assess and re-assess patient's lethality and potential for self-injury  - Engage patient in 1:1 interactions, daily, for a minimum of 15 minutes  - Encourage patient to express feelings, fears, frustrations, hopes  - Establish rapport/trust with patient   Outcome: Progressing  Goal: Refrain from harming self  Description: Interventions:  - Monitor patient closely, per order  - Develop a trusting relationship  - Supervise medication ingestion, monitor effects and side effects   Outcome: Progressing  Goal: Recognize maladaptive responses and adopt new coping mechanisms  Outcome: Progressing     Problem: Depression  Goal: Treatment Goal: Demonstrate behavioral control of depressive symptoms, verbalize feelings of improved mood/affect, and adopt new coping skills prior to discharge  Outcome: Progressing  Goal: Verbalize thoughts and feelings  Description: Interventions:  - Assess and re-assess patient's level of risk   - Engage patient in 1:1 interactions, daily, for a minimum of 15 minutes   - Encourage patient to express feelings, fears, frustrations, hopes    Outcome: Progressing  Goal: Refrain from isolation  Description: Interventions:  - Develop a trusting relationship   - Encourage socialization   Outcome: Progressing  Goal: Refrain from self-neglect  Outcome: Progressing  Goal: Attend and participate in unit activities, including therapeutic, recreational, and educational groups  Description: Interventions:  - Provide therapeutic and educational activities daily, encourage attendance and participation, and document same in the medical record   Outcome: Progressing     Problem: Anxiety  Goal: Anxiety is at manageable level  Description: Interventions:  - Assess and monitor patient's anxiety level.   - Monitor for signs and symptoms (heart palpitations, chest pain, shortness of breath, headaches, nausea, feeling jumpy, restlessness, irritable, apprehensive).   - Collaborate with interdisciplinary team and initiate plan and interventions as ordered.  - Normal patient to unit/surroundings  - Explain treatment plan  - Encourage participation in care  - Encourage verbalization of concerns/fears  - Identify coping mechanisms  - Assist in developing anxiety-reducing skills  - Administer/offer alternative therapies  - Limit or eliminate stimulants  Outcome: Progressing     Problem: Potential for Falls  Goal: Patient will remain free of falls  Description: INTERVENTIONS:  - Educate patient on patient safety including physical limitations  - Instruct patient to call for assistance with activity   - Consult OT/PT to assist with strengthening/mobility   - Keep Call bell within reach  - Keep bed low and locked with side rails adjusted as appropriate  - Keep care items and personal belongings within reach  - Initiate and maintain comfort rounds  - Offer Toileting every 2 Hours, in advance of need  - Initiate/Maintain bed and chair alarm  - Obtain necessary fall risk management equipment: walker, wheelchair  - Apply yellow socks and bracelet for high fall risk patients  - Patient  moved to room near nurses station  Outcome: Progressing

## 2024-10-13 NOTE — PROGRESS NOTES
Progress Note - Behavioral Health   Name: Meli Nowak 59 y.o. female I MRN: [id number]  Unit/Bed#: [facility] 651-02 I Date of Admission: 7/23/2024   Date of Service: 10/13/2024 I Hospital Day: 82     Assessment & Plan  Schizoaffective disorder, bipolar type (HCC)  Progress towards goals  Pending CRR placement  Depakote to 1250 mg nightly, VPA level to be obtained on 10/12/2024  Two antipsychotics utilized secondary to multiple failures of monotherapy  Continue medications as prescribed  Clozaril 700 mg nightly for schizoaffective disorder, Clozaril level 10/7 was 711, last ANC on 10/7 was 5.10    Risperdal to 2 mg qhs for ongoing psychosis   Ativan 0.5 mg twice daily for anxiety and paranoia  Melatonin 3 mg nightly for insomnia  Prazosin 2 mg nightly for PTSD associated nightmares   trazodone 50 mg nightly for insomnia          Plan   Progress Toward Goals:   Meli is progressing towards goals of inpatient psychiatric treatment by continued medication compliance and is attending therapeutic modalities on the milieu. However, the patient continues to require inpatient psychiatric hospitalization for continued medication management and titration to optimize symptom reduction, improve sleep hygiene, and demonstrate adequate self-care.    Recommended Treatment: Treatment plan and medication changes discussed and per the attending physician the plan is:    1.Continue with group therapy, milieu therapy and occupational therapy  2.Behavioral Health checks every 7 minutes  3.Continue frequent safety checks and vitals per unit protocol  4.Continue with SLIM medical management as indicated  5.Continue with current medication regimen  6.Will review labs in the a.m.  7.Disposition Planning: Discharge planning and efforts remain ongoing    Behavioral Health Medications: all current active meds have been reviewed and continue current psychiatric medications.  Current Facility-Administered Medications   Medication Dose Route  Frequency Provider Last Rate    acetaminophen  650 mg Oral Q4H PRN Marie Ziegler, CRNP      acetaminophen  650 mg Oral Q4H PRN Marie Ziegler, CRNP      acetaminophen  975 mg Oral Q6H PRN Marie Ziegler, CRNP      aluminum-magnesium hydroxide-simethicone  30 mL Oral Q4H PRN Parris Bolanos, CRNP      Artificial Tears  1 drop Both Eyes Q6H PRN Dereje Banuelos MD      atorvastatin  10 mg Oral Daily Marie Ziegler, CRNP      bisacodyl  10 mg Oral Daily PRN Kristen Logan, CRNP      bisacodyl  10 mg Rectal Daily PRN Marie Ziegler, CRNP      carvedilol  6.25 mg Oral BID With Meals Marie Ziegler, CRNP      cloZAPine  700 mg Oral HS Marie Ziegler, CRNP      cyanocobalamin  1,000 mcg Oral Daily Marie Ziegler, CRNP      famotidine  20 mg Oral BID Shan Fitzgerald DO      fluticasone  1 puff Inhalation Daily Marie Ziegler, CRNP      hydrOXYzine HCL  25 mg Oral Q6H PRN Max 4/day Marie Ziegler, CRNP      hydrOXYzine HCL  50 mg Oral Q6H PRN Max 4/day Marie Ziegler, CRNP      ipratropium-albuterol  3 mL Nebulization Q4H PRN Darin Lawton MD      LORazepam  0.5 mg Oral BID Marie Ziegler, CRNP      Or    LORazepam  0.5 mg Intramuscular BID Marie Ziegler, CRNP      LORazepam  1 mg Intramuscular Q6H PRN Max 3/day Marie Ziegler, CRNP      LORazepam  1 mg Oral Q6H PRN Max 3/day Marie Ziegler, CRNP      melatonin  3 mg Oral HS Marie Ziegler, CRNP      mirtazapine  7.5 mg Oral HS PRN Marie Ziegler, CRNP      Multivitamin  15 mL Oral Daily Marie Ziegler, CRNP      nicotine  1 patch Transdermal Daily Marie Ziegler, CRNP      OLANZapine  2.5 mg Oral Q6H PRN Dereje Banuelos MD      Or    OLANZapine  2.5 mg Intramuscular Q6H PRN Dereje Banuelos MD      OLANZapine  5 mg Oral Q6H PRN Dereje Banuelos MD      Or    OLANZapine  5 mg Intramuscular Q6H PRN Dereje Banuelos MD      ondansetron  4 mg Oral Q6H PRN Darin Lawton MD      polyethylene glycol  17 g Oral Daily Kristen Logan, CHRISTOPHERNP       polyethylene glycol  17 g Oral Daily PRN Marie Ziegler, CRVALE      prazosin  2 mg Oral HS Marie Toney, CRNP      risperiDONE  2 mg Oral HS Marie Toney, CRNP      senna-docusate sodium  2 tablet Oral HS Rehana Borrego PA-C      traZODone  50 mg Oral HS Marie Toney, CRVALE      valproic acid  1,250 mg Oral HS Marie Toney, CRNP         Risks / Benefits of Treatment:  Risks, benefits, and possible side effects of medications explained to patient and patient verbalizes understanding and agreement for treatment.       Subjective    Patient was seen today for continuation of care, records reviewed and patient was discussed with the morning case review team.    Meli was seen today for psychiatric follow-up.  On assessment today, Meli was pleasant and cooperative.  Still bizarre at times, patient was noted to be preoccupied with this writer placing mascara on her and obtaining a boyfriend.  Redirection successful.   VPA level obtained overnight, therapeutic at 54.  We will not titrate medication at this time, but will change tablets to liquid for easier consumption.  CBC, CK and CRP to be obtained tomorrow for continued Clozaril management.  Patient states that bowel movements continue to be regular with no current constipation.    Meli denies acute suicidal/self-harm ideation/intent/plan upon direct inquiry at this time.  Meli remains behaviorally appropriate, no agitation or aggression noted on exam or in report.  Meli also denies HI/AH/VH, and does  Impulse control is intact.  Meli remains adherent to her current psychotropic medication regimen and denies any side effects from medications, as well as none noted on exam.    Psychiatric Review of Systems:  Behavior over the last 24 hours:  unchanged.   Sleep: good  Appetite: good  Medication side effects: none  ROS: no complaints, denies shortness of breath or chest pain and all other systems are negative for acute changes        Objective     Vitals:  Vitals:    10/13/24 0811   BP: 122/75   Pulse: 102   Resp:    Temp:    SpO2:        Laboratory Results:  I have personally reviewed all pertinent laboratory/tests results.  Most Recent Labs:   Lab Results   Component Value Date    WBC 10.24 (H) 10/07/2024    RBC 4.13 10/07/2024    HGB 12.9 10/07/2024    HCT 37.8 10/07/2024     10/07/2024    RDW 14.7 10/07/2024    NEUTROABS 5.10 10/07/2024    SODIUM 138 10/12/2024    K 4.1 10/12/2024     10/12/2024    CO2 29 10/12/2024    BUN 18 10/12/2024    CREATININE 0.97 10/12/2024    GLUC 146 (H) 10/12/2024    GLUF 98 08/10/2024    CALCIUM 9.2 10/12/2024    AST 12 (L) 10/12/2024    ALT 12 10/12/2024    ALKPHOS 84 10/12/2024    TP 6.7 10/12/2024    ALB 3.9 10/12/2024    TBILI 0.26 10/12/2024    VALPROICTOT 54 10/12/2024    AMMONIA 32 07/03/2024    CHB3SMJBETXI 2.000 07/24/2024    HGBA1C 6.4 (H) 05/03/2024     05/03/2024       Mental Status Evaluation:  Appearance:  improved grooming   Behavior:  bizarre, guarded, more redirectable   Speech:  normal rate and volume   Mood:  less depressed, less irritable   Affect:  constricted   Thought Process:  illogical, perseverative, concrete   Thought Content:  Zoroastrianism preoccupation, paranoid ideation, ruminating thoughts   Perceptual Disturbances: chronic, denies when asked, but talks to self at times   Risk Potential: Suicidal ideation - None  Homicidal ideation - None  Potential for aggression - No   Memory:  recent and remote memory grossly intact   Sensorium  person, place, and time/date      Consciousness:  alert and awake   Attention: attention span and concentration are age appropriate   Insight:  limited   Judgment: limited   Gait/Station: normal gait/station   Motor Activity: no abnormal movements       Counseling / Coordination of Care:  Patient's progress reviewed with nursing staff.  Medications, treatment progress and treatment plan reviewed with patient.  Supportive counseling provided to the  patient.    This note was completed in part utilizing Dragon dictation Software. Grammatical, translation, syntax errors, random word insertions, spelling mistakes, and incomplete sentences may be an occasional consequence of this system secondary to software limitations with voice recognition, ambient noise, and hardware issues. If you have any questions or concerns about the content, text, or information contained within the body of this dictation, please contact the provider for clarification

## 2024-10-14 LAB
BASOPHILS # BLD AUTO: 0.09 THOUSANDS/ΜL (ref 0–0.1)
BASOPHILS NFR BLD AUTO: 1 % (ref 0–1)
CK SERPL-CCNC: 30 U/L (ref 26–192)
CRP SERPL QL: 2.2 MG/L
EOSINOPHIL # BLD AUTO: 0 THOUSAND/ΜL (ref 0–0.61)
EOSINOPHIL NFR BLD AUTO: 0 % (ref 0–6)
ERYTHROCYTE [DISTWIDTH] IN BLOOD BY AUTOMATED COUNT: 14.4 % (ref 11.6–15.1)
HCT VFR BLD AUTO: 39.2 % (ref 34.8–46.1)
HGB BLD-MCNC: 13.1 G/DL (ref 11.5–15.4)
IMM GRANULOCYTES # BLD AUTO: 0.03 THOUSAND/UL (ref 0–0.2)
IMM GRANULOCYTES NFR BLD AUTO: 0 % (ref 0–2)
LYMPHOCYTES # BLD AUTO: 3.44 THOUSANDS/ΜL (ref 0.6–4.47)
LYMPHOCYTES NFR BLD AUTO: 41 % (ref 14–44)
MCH RBC QN AUTO: 31.3 PG (ref 26.8–34.3)
MCHC RBC AUTO-ENTMCNC: 33.4 G/DL (ref 31.4–37.4)
MCV RBC AUTO: 94 FL (ref 82–98)
MONOCYTES # BLD AUTO: 1.03 THOUSAND/ΜL (ref 0.17–1.22)
MONOCYTES NFR BLD AUTO: 12 % (ref 4–12)
NEUTROPHILS # BLD AUTO: 3.8 THOUSANDS/ΜL (ref 1.85–7.62)
NEUTS SEG NFR BLD AUTO: 46 % (ref 43–75)
NRBC BLD AUTO-RTO: 0 /100 WBCS
PLATELET # BLD AUTO: 244 THOUSANDS/UL (ref 149–390)
PMV BLD AUTO: 10.1 FL (ref 8.9–12.7)
RBC # BLD AUTO: 4.19 MILLION/UL (ref 3.81–5.12)
WBC # BLD AUTO: 8.25 THOUSAND/UL (ref 4.31–10.16)

## 2024-10-14 PROCEDURE — 99232 SBSQ HOSP IP/OBS MODERATE 35: CPT | Performed by: STUDENT IN AN ORGANIZED HEALTH CARE EDUCATION/TRAINING PROGRAM

## 2024-10-14 PROCEDURE — 85025 COMPLETE CBC W/AUTO DIFF WBC: CPT

## 2024-10-14 PROCEDURE — 82550 ASSAY OF CK (CPK): CPT

## 2024-10-14 PROCEDURE — 86140 C-REACTIVE PROTEIN: CPT

## 2024-10-14 RX ADMIN — CARVEDILOL 6.25 MG: 6.25 TABLET, FILM COATED ORAL at 17:14

## 2024-10-14 RX ADMIN — LORAZEPAM 0.5 MG: 0.5 TABLET ORAL at 09:22

## 2024-10-14 RX ADMIN — MELATONIN TAB 3 MG 3 MG: 3 TAB at 21:17

## 2024-10-14 RX ADMIN — FAMOTIDINE 20 MG: 20 TABLET ORAL at 17:14

## 2024-10-14 RX ADMIN — POLYETHYLENE GLYCOL 3350 17 G: 17 POWDER, FOR SOLUTION ORAL at 09:20

## 2024-10-14 RX ADMIN — Medication 15 ML: at 09:21

## 2024-10-14 RX ADMIN — ATORVASTATIN CALCIUM 10 MG: 10 TABLET, FILM COATED ORAL at 09:22

## 2024-10-14 RX ADMIN — RISPERIDONE 2 MG: 2 TABLET, FILM COATED ORAL at 21:17

## 2024-10-14 RX ADMIN — SENNOSIDES AND DOCUSATE SODIUM 2 TABLET: 8.6; 5 TABLET ORAL at 21:17

## 2024-10-14 RX ADMIN — TRAZODONE HYDROCHLORIDE 50 MG: 50 TABLET ORAL at 21:17

## 2024-10-14 RX ADMIN — NICOTINE 1 PATCH: 7 PATCH, EXTENDED RELEASE TRANSDERMAL at 09:32

## 2024-10-14 RX ADMIN — LORAZEPAM 0.5 MG: 0.5 TABLET ORAL at 17:14

## 2024-10-14 RX ADMIN — FAMOTIDINE 20 MG: 20 TABLET ORAL at 09:21

## 2024-10-14 RX ADMIN — CLOZAPINE 700 MG: 200 TABLET ORAL at 21:17

## 2024-10-14 RX ADMIN — CYANOCOBALAMIN TAB 1000 MCG 1000 MCG: 1000 TAB at 09:22

## 2024-10-14 RX ADMIN — PRAZOSIN HYDROCHLORIDE 2 MG: 1 CAPSULE ORAL at 21:17

## 2024-10-14 RX ADMIN — FLUTICASONE FUROATE 1 PUFF: 100 POWDER RESPIRATORY (INHALATION) at 09:32

## 2024-10-14 RX ADMIN — CARVEDILOL 6.25 MG: 6.25 TABLET, FILM COATED ORAL at 09:21

## 2024-10-14 RX ADMIN — VALPROIC ACID 1250 MG: 500 SOLUTION ORAL at 21:17

## 2024-10-14 NOTE — TREATMENT TEAM
10/14/24 0900 10/14/24 1000 10/14/24 1330   Activity/Group Checklist   Group Exercise Other (Comment)  (Community meeting: affirmation and self imporvement goal) Other (Comment)  (Coping skills and d/c needs)   Attendance Attended Attended Attended   Attendance Duration (min) 46-60 46-60 31-45   Interactions Interacted appropriately Other (Comment)  (focused on coffee) Interacted appropriately   Affect/Mood Appropriate Calm Appropriate   Goals Achieved Able to listen to others Identified feelings;Identified triggers;Identified relapse prevention strategies;Able to listen to others;Increased hopefulness;Able to manage/cope with feelings;Able to reflect/comment on own behavior;Able to engage in interactions;Verbalized increased hopefulness;Able to self-disclose Identified feelings;Able to listen to others;Able to reflect/comment on own behavior;Discussed self-esteem issues;Discussed coping strategies;Able to manage/cope with feelings;Verbalized increased hopefulness;Able to recieve feedback;Able to self-disclose;Able to engage in interactions      10/14/24 1415   Activity/Group Checklist   Group Other (Comment)  (Mindfulness)   Attendance Attended   Attendance Duration (min) 16-30   Interactions Interacted appropriately   Affect/Mood Calm   Goals Achieved Able to listen to others;Able to engage in interactions;Able to self-disclose;Able to recieve feedback

## 2024-10-14 NOTE — NURSING NOTE
Patient is pleasant but continues to be religiously and sexually preoccupied.  She has also been ruminating on death and what happens to your soul after you die.  However, she is more redirectable when she starts talking about Catholic and death.  Patient is more visible on the unit this shift.  She is attending groups.  Medication and meal compliant.  Safety precautions maintained.

## 2024-10-14 NOTE — PROGRESS NOTES
Progress Note - Behavioral Health   Name: Meli Nowak 59 y.o. female I MRN: [id number]  Unit/Bed#: [facility] 651-02 I Date of Admission: 7/23/2024   Date of Service: 10/14/2024 I Hospital Day: 83     Assessment & Plan  Schizoaffective disorder, bipolar type (HCC)  Progress towards goals  Pending CRR placement  Depakote to 1250 mg nightly, VPA level 54  Two antipsychotics utilized secondary to multiple failures of monotherapy  Continue medications as prescribed  Clozaril 700 mg nightly for schizoaffective disorder, Clozaril level 10/7 was 711, last ANC on 10/7 was 5.10    Risperdal to 2 mg qhs for ongoing psychosis   Ativan 0.5 mg twice daily for anxiety and paranoia  Melatonin 3 mg nightly for insomnia  Prazosin 2 mg nightly for PTSD associated nightmares   trazodone 50 mg nightly for insomnia          Plan   Progress Toward Goals:   Meli is progressing towards goals of inpatient psychiatric treatment by continued medication compliance and is attending therapeutic modalities on the milieu. However, the patient continues to require inpatient psychiatric hospitalization for continued medication management and titration to optimize symptom reduction, improve sleep hygiene, and demonstrate adequate self-care.    Recommended Treatment: Treatment plan and medication changes discussed and per the attending physician the plan is:    1.Continue with group therapy, milieu therapy and occupational therapy  2.Behavioral Health checks every 7 minutes  3.Continue frequent safety checks and vitals per unit protocol  4.Continue with SLIM medical management as indicated  5.Continue with current medication regimen  6.Will review labs in the a.m.  7.Disposition Planning: Discharge planning and efforts remain ongoing    Behavioral Health Medications: all current active meds have been reviewed and continue current psychiatric medications.  Current Facility-Administered Medications   Medication Dose Route Frequency Provider Last Rate     acetaminophen  650 mg Oral Q4H PRN Marie Ziegler, CRNP      acetaminophen  650 mg Oral Q4H PRN Marie Ziegler, CRNP      acetaminophen  975 mg Oral Q6H PRN Marie Ziegler, CRNP      aluminum-magnesium hydroxide-simethicone  30 mL Oral Q4H PRN Parris Bolanos, CRNP      Artificial Tears  1 drop Both Eyes Q6H PRN Dereje Banuelos MD      atorvastatin  10 mg Oral Daily Marie Ziegler, CRNP      bisacodyl  10 mg Oral Daily PRN Kristen Logan, CRNP      bisacodyl  10 mg Rectal Daily PRN Marie Ziegler, CRNP      carvedilol  6.25 mg Oral BID With Meals Marie Ziegler, CRNP      cloZAPine  700 mg Oral HS Marie Ziegler, CRNP      cyanocobalamin  1,000 mcg Oral Daily Marie Ziegler, CRNP      famotidine  20 mg Oral BID Shan Fitzgerald,       fluticasone  1 puff Inhalation Daily Marie Ziegler, CRNP      hydrOXYzine HCL  25 mg Oral Q6H PRN Max 4/day Marie Ziegler, CRNP      hydrOXYzine HCL  50 mg Oral Q6H PRN Max 4/day Marie Ziegler, CRNP      ipratropium-albuterol  3 mL Nebulization Q4H PRN Darin Lawton MD      LORazepam  0.5 mg Oral BID Marie Ziegler, CRNP      Or    LORazepam  0.5 mg Intramuscular BID Marie Ziegler, CRNP      LORazepam  1 mg Intramuscular Q6H PRN Max 3/day Marie Ziegler, CRNP      LORazepam  1 mg Oral Q6H PRN Max 3/day Marie Ziegler, CRNP      melatonin  3 mg Oral HS Marie Ziegler, CRNP      mirtazapine  7.5 mg Oral HS PRN Marie Ziegler, CRNP      Multivitamin  15 mL Oral Daily Marie Ziegler, CRNP      nicotine  1 patch Transdermal Daily Marie Ziegler, CRNP      OLANZapine  2.5 mg Oral Q6H PRN Dereje Banuelos MD      Or    OLANZapine  2.5 mg Intramuscular Q6H PRN Dereje Banuelos MD      OLANZapine  5 mg Oral Q6H PRN Dereje Banuelos MD      Or    OLANZapine  5 mg Intramuscular Q6H PRN Dereje Banuelos MD      ondansetron  4 mg Oral Q6H PRN Darin Lawton MD      polyethylene glycol  17 g Oral Daily KristenJOSE ELIAS Estrada      polyethylene glycol  17 g Oral  Daily PRN Marie Ziegler, CRVALE      prazosin  2 mg Oral HS Marie Ziegler, CRNP      risperiDONE  2 mg Oral HS Mariegus Ziegler, CRVALE      senna-docusate sodium  2 tablet Oral HS Rehana Borrego PA-C      traZODone  50 mg Oral HS Marie Toney, CRVALE      valproic acid  1,250 mg Oral HS JOSE ELIAS Figueroa         Risks / Benefits of Treatment:  Risks, benefits, and possible side effects of medications explained to patient and patient verbalizes understanding and agreement for treatment.       Subjective    Patient was seen today for continuation of care, records reviewed and patient was discussed with the morning case review team.    Meli was seen today for psychiatric follow-up.  On assessment today, Meli was seen sitting in the day room.  More visible today, patient is noted to be smiling during interview and expresses that she is enjoying groups.  Catholic and sexual preoccupation still noted, patient is more redirectable with no as needed medications given overnight.  VPA level on 10/12/2024 at 54, will not make any changes at this time.  Clozaril to be continued at 700 mg daily, weekly labs obtained with no acute changes.  2 antipsychotics utilized secondary to multiple failures of monotherapy, patient also continues on Risperdal 2 mg nightly.  Tentative discharge ongoing pending CRR.    Meli denies acute suicidal/self-harm ideation/intent/plan upon direct inquiry at this time.  Meli remains behaviorally appropriate, no agitation or aggression noted on exam or in report.  Impulse control is intact.  Meli remains adherent to her current psychotropic medication regimen and denies any side effects from medications, as well as none noted on exam.    Psychiatric Review of Systems:  Behavior over the last 24 hours:  unchanged.   Sleep: good  Appetite: good  Medication side effects: none  ROS: no complaints, denies shortness of breath or chest pain and all other systems are negative for acute changes         Objective    Vitals:  Vitals:    10/14/24 0725   BP: 119/58   Pulse: 78   Resp: 16   Temp: 97.5 °F (36.4 °C)   SpO2: 94%       Laboratory Results:  I have personally reviewed all pertinent laboratory/tests results.  Most Recent Labs:   Lab Results   Component Value Date    WBC 8.25 10/14/2024    RBC 4.19 10/14/2024    HGB 13.1 10/14/2024    HCT 39.2 10/14/2024     10/14/2024    RDW 14.4 10/14/2024    NEUTROABS 3.80 10/14/2024    SODIUM 138 10/12/2024    K 4.1 10/12/2024     10/12/2024    CO2 29 10/12/2024    BUN 18 10/12/2024    CREATININE 0.97 10/12/2024    GLUC 146 (H) 10/12/2024    GLUF 98 08/10/2024    CALCIUM 9.2 10/12/2024    AST 12 (L) 10/12/2024    ALT 12 10/12/2024    ALKPHOS 84 10/12/2024    TP 6.7 10/12/2024    ALB 3.9 10/12/2024    TBILI 0.26 10/12/2024    VALPROICTOT 54 10/12/2024    AMMONIA 32 07/03/2024    [id number] 2.000 07/24/2024    HGBA1C 6.4 (H) 05/03/2024     05/03/2024       Mental Status Evaluation:  Appearance:  disheveled, marginal hygiene   Behavior:  bizarre, guarded   Speech:  normal rate and volume   Mood:  less anxious, less depressed, less irritable   Affect:  constricted   Thought Process:  illogical, perseverative, concrete   Thought Content:  Advent and sexual preoccupation, paranoid ideation   Perceptual Disturbances: denies when asked, but talks to self at times   Risk Potential: Suicidal ideation - None  Homicidal ideation - None  Potential for aggression - No   Memory:  recent and remote memory grossly intact   Sensorium  person and place      Consciousness:  alert and awake   Attention: attention span and concentration are age appropriate   Insight:  limited   Judgment: limited   Gait/Station: normal gait/station   Motor Activity: no abnormal movements       Counseling / Coordination of Care:  Patient's progress reviewed with nursing staff.  Medications, treatment progress and treatment plan reviewed with patient.  Supportive counseling provided to  the patient.    This note was completed in part utilizing Dragon dictation Software. Grammatical, translation, syntax errors, random word insertions, spelling mistakes, and incomplete sentences may be an occasional consequence of this system secondary to software limitations with voice recognition, ambient noise, and hardware issues. If you have any questions or concerns about the content, text, or information contained within the body of this dictation, please contact the provider for clarification

## 2024-10-14 NOTE — PROGRESS NOTES
10/14/24 0819   Team Meeting   Meeting Type Daily Rounds   Initial Conference Date 10/14/24   Next Conference Date 10/15/24   Team Members Present   Team Members Present Physician;Nurse;;   Physician Team Member Dr Banuelos, JAIR Goldman   Nursing Team Member Clarissa   Care Management Team Member Anum   Social Work Team Member Ayse   Patient/Family Present   Patient Present No   Patient's Family Present No     Bizarre, taking ativan over objection, depakote liquid, level 54, discharge pending CRR placement.

## 2024-10-14 NOTE — PLAN OF CARE
Problem: Alteration in Thoughts and Perception  Goal: Treatment Goal: Gain control of psychotic behaviors/thinking, reduce/eliminate presenting symptoms and demonstrate improved reality functioning upon discharge  Outcome: Progressing  Goal: Verbalize thoughts and feelings  Description: Interventions:  - Promote a nonjudgmental and trusting relationship with the patient through active listening and therapeutic communication  - Assess patient's level of functioning, behavior and potential for risk  - Engage patient in 1 on 1 interactions  - Encourage patient to express fears, feelings, frustrations, and discuss symptoms    - [place] patient to reality, help patient recognize reality-based thinking   - Administer medications as ordered and assess for potential side effects  - Provide the patient education related to the signs and symptoms of the illness and desired effects of prescribed medications  Outcome: Progressing  Goal: Refrain from acting on delusional thinking/internal stimuli  Description: Interventions:  - Monitor patient closely, per order   - Utilize least restrictive measures   - Set reasonable limits, give positive feedback for acceptable   - Administer medications as ordered and monitor of potential side effects  Outcome: Progressing  Goal: Agree to be compliant with medication regime, as prescribed and report medication side effects  Description: Interventions:  - Offer appropriate PRN medication and supervise ingestion; conduct AIMS, as needed   Outcome: Progressing  Goal: Recognize dysfunctional thoughts, communicate reality-based thoughts at the time of discharge  Description: Interventions:  - Provide medication and psycho-education to assist patient in compliance and developing insight into his/her illness   Outcome: Progressing  Goal: Complete daily ADLs, including personal hygiene independently, as able  Description: Interventions:  - Observe, teach, and assist patient with ADLS  - Monitor and  promote a balance of rest/activity, with adequate nutrition and elimination   Outcome: Progressing     Problem: Depression  Goal: Treatment Goal: Demonstrate behavioral control of depressive symptoms, verbalize feelings of improved mood/affect, and adopt new coping skills prior to discharge  Outcome: Progressing  Goal: Verbalize thoughts and feelings  Description: Interventions:  - Assess and re-assess patient's level of risk   - Engage patient in 1:1 interactions, daily, for a minimum of 15 minutes   - Encourage patient to express feelings, fears, frustrations, hopes   Outcome: Progressing  Goal: Refrain from isolation  Description: Interventions:  - Develop a trusting relationship   - Encourage socialization   Outcome: Progressing  Goal: Refrain from self-neglect  Outcome: Progressing

## 2024-10-14 NOTE — ASSESSMENT & PLAN NOTE
Progress towards goals  Pending CRR placement  Depakote to 1250 mg nightly, VPA level 54  Two antipsychotics utilized secondary to multiple failures of monotherapy  Continue medications as prescribed  Clozaril 700 mg nightly for schizoaffective disorder, Clozaril level 10/7 was 711, last ANC on 10/7 was 5.10    Risperdal to 2 mg qhs for ongoing psychosis   Ativan 0.5 mg twice daily for anxiety and paranoia  Melatonin 3 mg nightly for insomnia  Prazosin 2 mg nightly for PTSD associated nightmares   trazodone 50 mg nightly for insomnia

## 2024-10-15 PROCEDURE — 99232 SBSQ HOSP IP/OBS MODERATE 35: CPT | Performed by: STUDENT IN AN ORGANIZED HEALTH CARE EDUCATION/TRAINING PROGRAM

## 2024-10-15 RX ADMIN — VALPROIC ACID 1250 MG: 500 SOLUTION ORAL at 21:58

## 2024-10-15 RX ADMIN — CARVEDILOL 6.25 MG: 6.25 TABLET, FILM COATED ORAL at 17:03

## 2024-10-15 RX ADMIN — ATORVASTATIN CALCIUM 10 MG: 10 TABLET, FILM COATED ORAL at 09:48

## 2024-10-15 RX ADMIN — FAMOTIDINE 20 MG: 20 TABLET ORAL at 17:04

## 2024-10-15 RX ADMIN — CLOZAPINE 750 MG: 25 TABLET ORAL at 21:48

## 2024-10-15 RX ADMIN — TRAZODONE HYDROCHLORIDE 50 MG: 50 TABLET ORAL at 21:47

## 2024-10-15 RX ADMIN — CARVEDILOL 6.25 MG: 6.25 TABLET, FILM COATED ORAL at 09:47

## 2024-10-15 RX ADMIN — FAMOTIDINE 20 MG: 20 TABLET ORAL at 09:48

## 2024-10-15 RX ADMIN — CYANOCOBALAMIN TAB 1000 MCG 1000 MCG: 1000 TAB at 09:48

## 2024-10-15 RX ADMIN — RISPERIDONE 2 MG: 2 TABLET, FILM COATED ORAL at 21:48

## 2024-10-15 RX ADMIN — MELATONIN TAB 3 MG 3 MG: 3 TAB at 21:48

## 2024-10-15 RX ADMIN — LORAZEPAM 0.5 MG: 0.5 TABLET ORAL at 09:48

## 2024-10-15 RX ADMIN — POLYETHYLENE GLYCOL 3350 17 G: 17 POWDER, FOR SOLUTION ORAL at 09:48

## 2024-10-15 RX ADMIN — LORAZEPAM 0.5 MG: 0.5 TABLET ORAL at 17:03

## 2024-10-15 RX ADMIN — PRAZOSIN HYDROCHLORIDE 2 MG: 1 CAPSULE ORAL at 21:48

## 2024-10-15 RX ADMIN — NICOTINE 1 PATCH: 7 PATCH, EXTENDED RELEASE TRANSDERMAL at 09:48

## 2024-10-15 RX ADMIN — SENNOSIDES AND DOCUSATE SODIUM 2 TABLET: 8.6; 5 TABLET ORAL at 21:48

## 2024-10-15 RX ADMIN — Medication 15 ML: at 09:48

## 2024-10-15 NOTE — PROGRESS NOTES
10/15/24 0826   Team Meeting   Meeting Type Daily Rounds   Initial Conference Date 10/15/24   Next Conference Date 10/16/24   Team Members Present   Team Members Present Physician;Nurse;;   Physician Team Member Dr Banuelos, JAIR Goldman   Nursing Team Member Dona   Care Management Team Member Anum   Social Work Team Member Ayse   Patient/Family Present   Patient Present No   Patient's Family Present No     Eats 100%, found holding hands with peer, slept, taking meds, 305 hearing this am, no changes in meds for now, increase Clozaril and decrease Risperdal over weekend

## 2024-10-15 NOTE — NURSING NOTE
Patient is pleasant but continues to be religiously and sexually preoccupied. She is less visible today, sleeping on and off throughout the day.  Minimal group attendance noted. Medication and meal compliant.  She did refuse the nicotine patch.  Safety precautions maintained

## 2024-10-15 NOTE — PROGRESS NOTES
Progress Note - Behavioral Health   Name: Meli Nowak 59 y.o. female I MRN: [id number]  Unit/Bed#: [facility] 651-02 I Date of Admission: 7/23/2024   Date of Service: 10/15/2024 I Hospital Day: 84     Assessment & Plan  Schizoaffective disorder, bipolar type (HCC)  Progress towards goals  Pending CRR placement  Depakote to 1250 mg nightly, VPA level 54  Two antipsychotics utilized secondary to multiple failures of monotherapy  Continue medications as prescribed  Clozaril 750 mg nightly for schizoaffective disorder, Clozaril level 10/7 was 711, last ANC on 10/14 was 3.80   Risperdal to 2 mg qhs for ongoing psychosis   Ativan 0.5 mg twice daily for anxiety and paranoia  Melatonin 3 mg nightly for insomnia  Prazosin 2 mg nightly for PTSD associated nightmares   trazodone 50 mg nightly for insomnia          Plan   Progress Toward Goals:   Meli is progressing towards goals of inpatient psychiatric treatment by continued medication compliance and is attending therapeutic modalities on the milieu. However, the patient continues to require inpatient psychiatric hospitalization for continued medication management and titration to optimize symptom reduction, improve sleep hygiene, and demonstrate adequate self-care.    Recommended Treatment: Treatment plan and medication changes discussed and per the attending physician the plan is:    1.Continue with group therapy, milieu therapy and occupational therapy  2.Behavioral Health checks every 7 minutes  3.Continue frequent safety checks and vitals per unit protocol  4.Continue with SLIM medical management as indicated  5.Continue with current medication regimen  6.Will review labs in the a.m.  7.Disposition Planning: Discharge planning and efforts remain ongoing    Behavioral Health Medications: all current active meds have been reviewed.  Current Facility-Administered Medications   Medication Dose Route Frequency Provider Last Rate    acetaminophen  650 mg Oral Q4H PRN Marie  Toney, CRNP      acetaminophen  650 mg Oral Q4H PRN Marie Ziegler, CRNP      acetaminophen  975 mg Oral Q6H PRN Marie Ziegler, CRNP      aluminum-magnesium hydroxide-simethicone  30 mL Oral Q4H PRN Parris Bolanos, CRNP      Artificial Tears  1 drop Both Eyes Q6H PRN Dereje Banuelos MD      atorvastatin  10 mg Oral Daily Marie Ziegler, CRNP      bisacodyl  10 mg Oral Daily PRN Kristen Logan, CRNP      bisacodyl  10 mg Rectal Daily PRN Marie Ziegler, CRNP      carvedilol  6.25 mg Oral BID With Meals Marie Ziegler, CRNP      cloZAPine  700 mg Oral HS Marie Ziegler, CRNP      cyanocobalamin  1,000 mcg Oral Daily Marie Ziegler, CRNP      famotidine  20 mg Oral BID Shan Fitzgerald, DO      fluticasone  1 puff Inhalation Daily Marie Ziegler, CRNP      hydrOXYzine HCL  25 mg Oral Q6H PRN Max 4/day Marie Ziegler, CRNP      hydrOXYzine HCL  50 mg Oral Q6H PRN Max 4/day Marie Ziegler, CRNP      ipratropium-albuterol  3 mL Nebulization Q4H PRN Darin Lawton MD      LORazepam  0.5 mg Oral BID Marie Ziegler, CRNP      Or    LORazepam  0.5 mg Intramuscular BID Marie Ziegler, CRNP      LORazepam  1 mg Intramuscular Q6H PRN Max 3/day Marie Ziegler, CRNP      LORazepam  1 mg Oral Q6H PRN Max 3/day Marie Ziegler, CRNP      melatonin  3 mg Oral HS Marie iZegler, CRNP      mirtazapine  7.5 mg Oral HS PRN Marie Ziegler, CRNP      Multivitamin  15 mL Oral Daily Marie Ziegler, CRNP      nicotine  1 patch Transdermal Daily Marie Ziegler, CRNP      OLANZapine  2.5 mg Oral Q6H PRN Dereje Banuelos MD      Or    OLANZapine  2.5 mg Intramuscular Q6H PRN Dereje Banuelos MD      OLANZapine  5 mg Oral Q6H PRN Dereje Banuelos MD      Or    OLANZapine  5 mg Intramuscular Q6H PRN Dereje Banuelos MD      ondansetron  4 mg Oral Q6H PRN Darin Lawton MD      polyethylene glycol  17 g Oral Daily Kristen Logan, CRNP      polyethylene glycol  17 g Oral Daily PRN JOSE ELIAS Figueroa       prazosin  2 mg Oral HS JOSE ELIAS Figueroa      risperiDONE  2 mg Oral HS Marie JOSE ELIAS Ziegler      senna-docusate sodium  2 tablet Oral HS Rehana Borrego PA-C      traZODone  50 mg Oral HS Marie JOSE ELIAS Ziegler      valproic acid  1,250 mg Oral HS JOSE ELIAS Figueroa         Risks / Benefits of Treatment:  Risks, benefits, and possible side effects of medications explained to patient and patient verbalizes understanding and agreement for treatment.       Subjective    Patient was seen today for continuation of care, records reviewed and patient was discussed with the morning case review team.    Meli was seen today for psychiatric follow-up.  On assessment today, Meli was lying on her bed. Visible this morning, participating in some groups. Cooperative during interview. Reports feeling tired this morning. Endorses sleeping well last night. Reports complaint about her clothing not being washed or being washed well. Appears preoccupied and paranoid over laundry and which staff member does her laundry. Denies all psychiatric symptoms except endorses intermittent auditory hallucinations of voices. Unsure of what they are saying. Endorses adequate sleep and appetite. Mandaeism and sexual preoccupation still noted, patient redirectable, no PRN medications given overnight. VPA level 10/12/24 54, will not make changes at this time. Increase Clozaril to 750 mg PO daily today, weekly labs obtained with no acute changes. 2 antipsychotics utilized secondary to multiple failures of monotherapy, patient to continue Risperdal 2 mg nightly. Tentative discharge ongoing pending CRR placement.     Meli denies acute suicidal/self-harm ideation/intent/plan upon direct inquiry at this time.  Meli remains behaviorally appropriate, no agitation or aggression noted on exam or in report.  Meli also denies HI/VH, and does not appear overtly manic. Impulse control is intact.  Meli remains adherent to her current psychotropic  medication regimen and denies any side effects from medications, as well as none noted on exam.    Psychiatric Review of Systems:  Behavior over the last 24 hours:  unchanged.   Sleep: Good  Appetite: Good  Medication side effects: none  ROS: no complaints, denies shortness of breath or chest pain and all other systems are negative for acute changes        Objective    Vitals:  Vitals:    10/15/24 0818   BP: 139/82   Pulse: 88   Resp: 17   Temp: 97.5 °F (36.4 °C)   SpO2: 93%       Laboratory Results:  I have personally reviewed all pertinent laboratory/tests results.    Mental Status Evaluation:  Appearance:  disheveled   Behavior:  calm, bizarre, guarded   Speech:  normal rate and volume   Mood:  less anxious, less depressed, less irritable   Affect:  constricted   Thought Process:  Illogical, perseverative, concrete   Thought Content:  Yarsani and sexual preoccupation, paranoid ideation   Perceptual Disturbances: auditory hallucinations   Risk Potential: Suicidal ideation - None  Homicidal ideation - None  Potential for aggression - No   Memory:  recent and remote memory grossly intact   Sensorium  person and place      Consciousness:  alert and awake   Attention: attention span and concentration are age appropriate   Insight:  limited   Judgment: limited   Gait/Station: normal gait/station   Motor Activity: no abnormal movements       Counseling / Coordination of Care:  Patient's progress reviewed with nursing staff.  Medications, treatment progress and treatment plan reviewed with patient.  Supportive counseling provided to the patient.    This note was completed in part utilizing Dragon dictation Software. Grammatical, translation, syntax errors, random word insertions, spelling mistakes, and incomplete sentences may be an occasional consequence of this system secondary to software limitations with voice recognition, ambient noise, and hardware issues. If you have any questions or concerns about the content,  text, or information contained within the body of this dictation, please contact the provider for clarification

## 2024-10-15 NOTE — NURSING NOTE
Patient  isolative to self in room, visible in milieu only for meals and snacks. She is pleasant on approach. Medication and meal compliant. She reports mild anxiety and depression and denies all other psych s/s. Safety checks maintained.

## 2024-10-15 NOTE — PLAN OF CARE
Problem: DISCHARGE PLANNING - CARE MANAGEMENT  Goal: Discharge to post-acute care or home with appropriate resources  Description: INTERVENTIONS:  - Conduct assessment to determine patient/family and health care team treatment goals, and need for post-acute services based on payer coverage, community resources, and patient preferences, and barriers to discharge  - Address psychosocial, clinical, and financial barriers to discharge as identified in assessment in conjunction with the patient/family and health care team  - Arrange appropriate level of post-acute services according to patient’s   needs and preference and payer coverage in collaboration with the physician and health care team  - Communicate with and update the patient/family, physician, and health care team regarding progress on the discharge plan  - Arrange appropriate transportation to post-acute venues  Outcome: Progressing     Problem: Prexisting or High Potential for Compromised Skin Integrity  Goal: Skin integrity is maintained or improved  Description: INTERVENTIONS:  - Identify patients at risk for skin breakdown  - Assess and monitor skin integrity  - Assess and monitor nutrition and hydration status  - Monitor labs   - Assess for incontinence   - Turn and reposition patient  - Assist with mobility/ambulation  - Relieve pressure over bony prominences  - Avoid friction and shearing  - Provide appropriate hygiene as needed including keeping skin clean and dry  - Evaluate need for skin moisturizer/barrier cream  - Collaborate with interdisciplinary team   - Patient/family teaching  - Consider wound care consult   Outcome: Progressing     Problem: Alteration in Thoughts and Perception  Goal: Treatment Goal: Gain control of psychotic behaviors/thinking, reduce/eliminate presenting symptoms and demonstrate improved reality functioning upon discharge  Outcome: Progressing  Goal: Verbalize thoughts and feelings  Description: Interventions:  - Promote  a nonjudgmental and trusting relationship with the patient through active listening and therapeutic communication  - Assess patient's level of functioning, behavior and potential for risk  - Engage patient in 1 on 1 interactions  - Encourage patient to express fears, feelings, frustrations, and discuss symptoms    - [place] patient to reality, help patient recognize reality-based thinking   - Administer medications as ordered and assess for potential side effects  - Provide the patient education related to the signs and symptoms of the illness and desired effects of prescribed medications  Outcome: Progressing  Goal: Refrain from acting on delusional thinking/internal stimuli  Description: Interventions:  - Monitor patient closely, per order   - Utilize least restrictive measures   - Set reasonable limits, give positive feedback for acceptable   - Administer medications as ordered and monitor of potential side effects  Outcome: Progressing  Goal: Agree to be compliant with medication regime, as prescribed and report medication side effects  Description: Interventions:  - Offer appropriate PRN medication and supervise ingestion; conduct AIMS, as needed   Outcome: Progressing  Goal: Attend and participate in unit activities, including therapeutic, recreational, and educational groups  Description: Interventions:  -Encourage Visitation and family involvement in care  Outcome: Progressing  Goal: Complete daily ADLs, including personal hygiene independently, as able  Description: Interventions:  - Observe, teach, and assist patient with ADLS  - Monitor and promote a balance of rest/activity, with adequate nutrition and elimination   Outcome: Progressing     Problem: Risk for Self Injury/Neglect  Goal: Treatment Goal: Remain safe during length of stay, learn and adopt new coping skills, and be free of self-injurious ideation, impulses and acts at the time of discharge  Outcome: Progressing

## 2024-10-15 NOTE — ASSESSMENT & PLAN NOTE
Progress towards goals  Pending CRR placement  Depakote to 1250 mg nightly, VPA level 54  Two antipsychotics utilized secondary to multiple failures of monotherapy  Continue medications as prescribed  Clozaril 750 mg nightly for schizoaffective disorder, Clozaril level 10/7 was 711, last ANC on 10/14 was 3.80   Risperdal to 2 mg qhs for ongoing psychosis   Ativan 0.5 mg twice daily for anxiety and paranoia  Melatonin 3 mg nightly for insomnia  Prazosin 2 mg nightly for PTSD associated nightmares   trazodone 50 mg nightly for insomnia

## 2024-10-15 NOTE — CASE MANAGEMENT
Pt informed of 305 hearing this morning with [facility] and pt's rights to attend. Pt reports being in agreement with continued hospitalization and declined to attend hearing.     305 hearing held with [facility], Dr Banuelos testified with request for up to 180 days. Awaiting court decision. CM informed pt of hearing and pending determination.

## 2024-10-15 NOTE — NURSING NOTE
Pt blocking this evening during conversation had to be redirected. Seen in dining room touching another male peer, reminded that  no touching is allowed. Medication and meal compliant. Denies SI/Hi. Q 15 minute checks maintained.

## 2024-10-16 PROCEDURE — 99232 SBSQ HOSP IP/OBS MODERATE 35: CPT | Performed by: STUDENT IN AN ORGANIZED HEALTH CARE EDUCATION/TRAINING PROGRAM

## 2024-10-16 RX ADMIN — MELATONIN TAB 3 MG 3 MG: 3 TAB at 21:16

## 2024-10-16 RX ADMIN — Medication 15 ML: at 09:44

## 2024-10-16 RX ADMIN — POLYETHYLENE GLYCOL 3350 17 G: 17 POWDER, FOR SOLUTION ORAL at 09:44

## 2024-10-16 RX ADMIN — CLOZAPINE 750 MG: 25 TABLET ORAL at 21:16

## 2024-10-16 RX ADMIN — RISPERIDONE 2 MG: 2 TABLET, FILM COATED ORAL at 21:16

## 2024-10-16 RX ADMIN — FAMOTIDINE 20 MG: 20 TABLET ORAL at 09:43

## 2024-10-16 RX ADMIN — ATORVASTATIN CALCIUM 10 MG: 10 TABLET, FILM COATED ORAL at 09:44

## 2024-10-16 RX ADMIN — VALPROIC ACID 1250 MG: 500 SOLUTION ORAL at 21:14

## 2024-10-16 RX ADMIN — SENNOSIDES AND DOCUSATE SODIUM 2 TABLET: 8.6; 5 TABLET ORAL at 21:16

## 2024-10-16 RX ADMIN — CARVEDILOL 6.25 MG: 6.25 TABLET, FILM COATED ORAL at 17:00

## 2024-10-16 RX ADMIN — LORAZEPAM 0.5 MG: 0.5 TABLET ORAL at 09:43

## 2024-10-16 RX ADMIN — CYANOCOBALAMIN TAB 1000 MCG 1000 MCG: 1000 TAB at 09:44

## 2024-10-16 RX ADMIN — CARVEDILOL 6.25 MG: 6.25 TABLET, FILM COATED ORAL at 09:43

## 2024-10-16 RX ADMIN — LORAZEPAM 0.5 MG: 0.5 TABLET ORAL at 17:00

## 2024-10-16 RX ADMIN — TRAZODONE HYDROCHLORIDE 50 MG: 50 TABLET ORAL at 21:16

## 2024-10-16 RX ADMIN — PRAZOSIN HYDROCHLORIDE 2 MG: 1 CAPSULE ORAL at 21:16

## 2024-10-16 RX ADMIN — FAMOTIDINE 20 MG: 20 TABLET ORAL at 17:01

## 2024-10-16 NOTE — TREATMENT TEAM
10/16/24 0900 10/16/24 1000 10/16/24 1100   Activity/Group Checklist   Group Exercise Community meeting Anger management   Attendance Attended Attended Did not attend   Attendance Duration (min) 16-30 31-45  --    Interactions Interacted appropriately Did not interact  --    Affect/Mood Appropriate Constricted  --    Goals Achieved Able to engage in interactions;Able to listen to others Identified feelings;Identified triggers;Identified relapse prevention strategies;Able to listen to others  --       10/16/24 1330   Activity/Group Checklist   Group Other (Comment)  (Coping skills and reminsicing)   Attendance Did not attend   Attendance Duration (min)  --    Interactions  --    Affect/Mood  --    Goals Achieved  --

## 2024-10-16 NOTE — PROGRESS NOTES
10/16/24 0800   Team Meeting   Meeting Type Daily Rounds   Initial Conference Date 10/16/24   Next Conference Date 10/17/24   Team Members Present   Team Members Present Physician;Nurse;;   Physician Team Member Dr Banuelos, JAIR Goldman   Nursing Team Member Dona   Care Management Team Member Anum   Social Work Team Member Ayse   Patient/Family Present   Patient Present No   Patient's Family Present No     Discharge pending CRR, Increased Clozaril to 750, level on 10/18, may get rid of Risperdal, Clozaril level 711, reported AH

## 2024-10-16 NOTE — PROGRESS NOTES
Progress Note - Behavioral Health   Name: Meli Nowak 59 y.o. female I MRN: [id number]  Unit/Bed#: [facility] 651-02 I Date of Admission: 7/23/2024   Date of Service: 10/16/2024 I Hospital Day: 85     Assessment & Plan  Schizoaffective disorder, bipolar type (HCC)  Progress towards goals  Pending CRR placement  Depakote to 1250 mg nightly, VPA level 54  Two antipsychotics utilized secondary to multiple failures of monotherapy  Continue medications as prescribed  Clozaril 750 mg nightly for schizoaffective disorder, Clozaril level 10/7 was 711, last ANC on 10/14 was 3.80; labs to be obtained 10/18.  Risperdal to 2 mg qhs for ongoing psychosis   Ativan 0.5 mg twice daily for anxiety and paranoia  Melatonin 3 mg nightly for insomnia  Prazosin 2 mg nightly for PTSD associated nightmares   trazodone 50 mg nightly for insomnia          Plan   Progress Toward Goals:   Meli is progressing towards goals of inpatient psychiatric treatment by continued medication compliance and is attending therapeutic modalities on the milieu. However, the patient continues to require inpatient psychiatric hospitalization for continued medication management and titration to optimize symptom reduction, improve sleep hygiene, and demonstrate adequate self-care.    Recommended Treatment: Treatment plan and medication changes discussed and per the attending physician the plan is:    1.Continue with group therapy, milieu therapy and occupational therapy  2.Behavioral Health checks every 7 minutes  3.Continue frequent safety checks and vitals per unit protocol  4.Continue with SLIM medical management as indicated  5.Continue with current medication regimen  6.Will review labs in the a.m.  7.Disposition Planning: Discharge planning and efforts remain ongoing    Behavioral Health Medications: all current active meds have been reviewed and continue current psychiatric medications.  Current Facility-Administered Medications   Medication Dose Route  Frequency Provider Last Rate    acetaminophen  650 mg Oral Q4H PRN Marie Ziegler, CRNP      acetaminophen  650 mg Oral Q4H PRN Marie Ziegler, CRNP      acetaminophen  975 mg Oral Q6H PRN Marie Ziegler, CRNP      aluminum-magnesium hydroxide-simethicone  30 mL Oral Q4H PRN Parris Bolanos, CRNP      Artificial Tears  1 drop Both Eyes Q6H PRN Dereje Banuelos MD      atorvastatin  10 mg Oral Daily Marie Ziegler, CRNP      bisacodyl  10 mg Oral Daily PRN Kristen Logan, CRNP      bisacodyl  10 mg Rectal Daily PRN Marie Ziegler, CRNP      carvedilol  6.25 mg Oral BID With Meals Marie Ziegler, CRNP      cloZAPine  750 mg Oral HS Marie Ziegler, CRNP      cyanocobalamin  1,000 mcg Oral Daily Marie Ziegler, CRNP      famotidine  20 mg Oral BID Shan Fitzgerald DO      fluticasone  1 puff Inhalation Daily Marie Ziegler, CRNP      hydrOXYzine HCL  25 mg Oral Q6H PRN Max 4/day Marie Ziegler, CRNP      hydrOXYzine HCL  50 mg Oral Q6H PRN Max 4/day Marie Ziegler, CRNP      ipratropium-albuterol  3 mL Nebulization Q4H PRN Darin Lawton MD      LORazepam  0.5 mg Oral BID Marie Ziegler, CRNP      Or    LORazepam  0.5 mg Intramuscular BID Marie Ziegler, CRNP      LORazepam  1 mg Intramuscular Q6H PRN Max 3/day Marie Ziegler, CRNP      LORazepam  1 mg Oral Q6H PRN Max 3/day Marie Ziegler, CRNP      melatonin  3 mg Oral HS Marie Ziegler, CRNP      mirtazapine  7.5 mg Oral HS PRN Marie Ziegler, CRNP      Multivitamin  15 mL Oral Daily Marie Ziegler, CRNP      nicotine  1 patch Transdermal Daily Marie Ziegler, CRNP      OLANZapine  2.5 mg Oral Q6H PRN Dereje Banuelos MD      Or    OLANZapine  2.5 mg Intramuscular Q6H PRN Dereje Banuelos MD      OLANZapine  5 mg Oral Q6H PRN Dereje Banuelos MD      Or    OLANZapine  5 mg Intramuscular Q6H PRN Dereje Banuelos MD      ondansetron  4 mg Oral Q6H PRN Darin Lawton MD      polyethylene glycol  17 g Oral Daily Kristen Logan, CHRISTOPHERNP    "   polyethylene glycol  17 g Oral Daily PRN Marie Ziegler, CRVALE      prazosin  2 mg Oral HS Marie Toney, CRNP      risperiDONE  2 mg Oral HS Marie Toney, CRVALE      senna-docusate sodium  2 tablet Oral HS Rehana Borrego PA-C      traZODone  50 mg Oral HS Marie Toney, CRVALE      valproic acid  1,250 mg Oral HS Marie Toney, JOSE ELIAS         Risks / Benefits of Treatment:  Risks, benefits, and possible side effects of medications explained to patient and patient verbalizes understanding and agreement for treatment.       Subjective    Patient was seen today for continuation of care, records reviewed and patient was discussed with the morning case review team.    Meli was seen today for psychiatric follow-up.  On assessment today, Meli was pleasant but tangential and smiling inappropriately during interview. Stating \" I feel like I'm kelvin, I get to have two husbands. Their spirits have entered my heart and I can't let them leave\". Unable to verbalize when she was  or who her husbands are, patient then looked at this writer and stated \" Please don't tell the doctor, he'll think I'm crazy and I'm not. Ill wait here for them, I know they're coming for me. I can't go to a group home without them\". Reality testing attempted but unsuccessful, patient also continuous to be sexually and religiously preoccupied requiring frequent redirection from staff members. Clozaril recently increased to 750 mg daily, will obtain level on 10/18/2024 in addition to weekly labs. Risperdal also to be tapered off starting 10/19 due to moderate effectivity. Meli continues to require two antipsychotics secondary to multiple failures of monotherapy.  Tentative discharge pending CRR placement.     [place] denies acute suicidal/self-harm ideation/intent/plan upon direct inquiry at this time.  [place] remains behaviorally appropriate, no agitation or aggression noted on exam or in report.  AH decreased, patient still states that " she intermittently hears her fathers voice but that it is un bothersome.  Impulse control is intact.  Meli remains adherent to her current psychotropic medication regimen and denies any side effects from medications, as well as none noted on exam.    Psychiatric Review of Systems:  Behavior over the last 24 hours:  unchanged.   Sleep: good  Appetite: good  Medication side effects: none  ROS: no complaints, denies shortness of breath or chest pain and all other systems are negative for acute changes        Objective    Vitals:  Vitals:    10/16/24 0942   BP: 152/84   Pulse: 92   Resp:    Temp:    SpO2:        Laboratory Results:  I have personally reviewed all pertinent laboratory/tests results.  Most Recent Labs:   Lab Results   Component Value Date    WBC 8.25 10/14/2024    RBC 4.19 10/14/2024    HGB 13.1 10/14/2024    HCT 39.2 10/14/2024     10/14/2024    RDW 14.4 10/14/2024    NEUTROABS 3.80 10/14/2024    SODIUM 138 10/12/2024    K 4.1 10/12/2024     10/12/2024    CO2 29 10/12/2024    BUN 18 10/12/2024    CREATININE 0.97 10/12/2024    GLUC 146 (H) 10/12/2024    GLUF 98 08/10/2024    CALCIUM 9.2 10/12/2024    AST 12 (L) 10/12/2024    ALT 12 10/12/2024    ALKPHOS 84 10/12/2024    TP 6.7 10/12/2024    ALB 3.9 10/12/2024    TBILI 0.26 10/12/2024    VALPROICTOT 54 10/12/2024    AMMONIA 32 07/03/2024    [id number] 2.000 07/24/2024    HGBA1C 6.4 (H) 05/03/2024     05/03/2024       Mental Status Evaluation:  Appearance:  disheveled, marginal hygiene, wearing hospital clothes   Behavior:  bizarre, guarded   Speech:  normal rate and volume   Mood:  less anxious, less depressed, less irritable   Affect:  constricted   Thought Process:  illogical, perseverative, concrete, more tangential   Thought Content:  Adventism and sexual preoccupation, paranoid ideation   Perceptual Disturbances: auditory hallucinations still present, but less frequent   Risk Potential: Suicidal ideation - None  Homicidal  ideation - None  Potential for aggression - No   Memory:  recent and remote memory grossly intact   Sensorium  person, place, and time/date      Consciousness:  alert and awake   Attention: attention span and concentration are age appropriate   Insight:  limited   Judgment: limited   Gait/Station: normal gait/station   Motor Activity: no abnormal movements       Counseling / Coordination of Care:  Patient's progress reviewed with nursing staff.  Medications, treatment progress and treatment plan reviewed with patient.  Supportive counseling provided to the patient.    This note was completed in part utilizing Dragon dictation Software. Grammatical, translation, syntax errors, random word insertions, spelling mistakes, and incomplete sentences may be an occasional consequence of this system secondary to software limitations with voice recognition, ambient noise, and hardware issues. If you have any questions or concerns about the content, text, or information contained within the body of this dictation, please contact the provider for clarification

## 2024-10-16 NOTE — PLAN OF CARE
Problem: Alteration in Thoughts and Perception  Goal: Treatment Goal: Gain control of psychotic behaviors/thinking, reduce/eliminate presenting symptoms and demonstrate improved reality functioning upon discharge  Outcome: Progressing  Goal: Verbalize thoughts and feelings  Description: Interventions:  - Promote a nonjudgmental and trusting relationship with the patient through active listening and therapeutic communication  - Assess patient's level of functioning, behavior and potential for risk  - Engage patient in 1 on 1 interactions  - Encourage patient to express fears, feelings, frustrations, and discuss symptoms    - [place] patient to reality, help patient recognize reality-based thinking   - Administer medications as ordered and assess for potential side effects  - Provide the patient education related to the signs and symptoms of the illness and desired effects of prescribed medications  Outcome: Progressing  Goal: Refrain from acting on delusional thinking/internal stimuli  Description: Interventions:  - Monitor patient closely, per order   - Utilize least restrictive measures   - Set reasonable limits, give positive feedback for acceptable   - Administer medications as ordered and monitor of potential side effects  Outcome: Progressing  Goal: Agree to be compliant with medication regime, as prescribed and report medication side effects  Description: Interventions:  - Offer appropriate PRN medication and supervise ingestion; conduct AIMS, as needed   Outcome: Progressing  Goal: Attend and participate in unit activities, including therapeutic, recreational, and educational groups  Description: Interventions:  -Encourage Visitation and family involvement in care  Outcome: Progressing  Goal: Recognize dysfunctional thoughts, communicate reality-based thoughts at the time of discharge  Description: Interventions:  - Provide medication and psycho-education to assist patient in compliance and developing  insight into his/her illness   Outcome: Progressing  Goal: Complete daily ADLs, including personal hygiene independently, as able  Description: Interventions:  - Observe, teach, and assist patient with ADLS  - Monitor and promote a balance of rest/activity, with adequate nutrition and elimination   Outcome: Progressing     Problem: Alteration in Thoughts and Perception  Goal: Verbalize thoughts and feelings  Description: Interventions:  - Promote a nonjudgmental and trusting relationship with the patient through active listening and therapeutic communication  - Assess patient's level of functioning, behavior and potential for risk  - Engage patient in 1 on 1 interactions  - Encourage patient to express fears, feelings, frustrations, and discuss symptoms    - [place] patient to reality, help patient recognize reality-based thinking   - Administer medications as ordered and assess for potential side effects  - Provide the patient education related to the signs and symptoms of the illness and desired effects of prescribed medications  Outcome: Progressing     Problem: Depression  Goal: Verbalize thoughts and feelings  Description: Interventions:  - Assess and re-assess patient's level of risk   - Engage patient in 1:1 interactions, daily, for a minimum of 15 minutes   - Encourage patient to express feelings, fears, frustrations, hopes   Outcome: Progressing

## 2024-10-16 NOTE — NURSING NOTE
Patient is bizarre and suspicious. Withdrawn to room and listening to radio. Reminded of no BM documented for 3 days. Patient stated she had BM today. No inappropriate behaviors w/ males observed.

## 2024-10-16 NOTE — ASSESSMENT & PLAN NOTE
Progress towards goals  Pending CRR placement  Depakote to 1250 mg nightly, VPA level 54  Two antipsychotics utilized secondary to multiple failures of monotherapy  Continue medications as prescribed  Clozaril 750 mg nightly for schizoaffective disorder, Clozaril level 10/7 was 711, last ANC on 10/14 was 3.80; labs to be obtained 10/18.  Risperdal to 2 mg qhs for ongoing psychosis   Ativan 0.5 mg twice daily for anxiety and paranoia  Melatonin 3 mg nightly for insomnia  Prazosin 2 mg nightly for PTSD associated nightmares   trazodone 50 mg nightly for insomnia

## 2024-10-16 NOTE — NURSING NOTE
"Refused Arnuity inhaler and nicotine patch stating \"I don't need those things anymore. I quit smoking.\"  "

## 2024-10-16 NOTE — NURSING NOTE
"Patient is bizarre, disorganized, delusional, and internally preoccupied. Visible intermittently and social w/ select peers. Appetite is adequate. Appearance is disheveled. Medication compliant. Attended two groups. Patient requesting  to perform a marriage on unit. Patient stating \"I want a spiritual marriage to Leonel. He's waiting for me.\" Fall precautions in place. Gait is steady. Continuous rounding preformed.   "

## 2024-10-17 PROCEDURE — 99232 SBSQ HOSP IP/OBS MODERATE 35: CPT | Performed by: STUDENT IN AN ORGANIZED HEALTH CARE EDUCATION/TRAINING PROGRAM

## 2024-10-17 RX ADMIN — SENNOSIDES AND DOCUSATE SODIUM 2 TABLET: 8.6; 5 TABLET ORAL at 21:22

## 2024-10-17 RX ADMIN — LORAZEPAM 0.5 MG: 0.5 TABLET ORAL at 10:15

## 2024-10-17 RX ADMIN — ATORVASTATIN CALCIUM 10 MG: 10 TABLET, FILM COATED ORAL at 10:15

## 2024-10-17 RX ADMIN — PRAZOSIN HYDROCHLORIDE 2 MG: 1 CAPSULE ORAL at 21:22

## 2024-10-17 RX ADMIN — LORAZEPAM 0.5 MG: 0.5 TABLET ORAL at 17:23

## 2024-10-17 RX ADMIN — CARVEDILOL 6.25 MG: 6.25 TABLET, FILM COATED ORAL at 10:15

## 2024-10-17 RX ADMIN — FAMOTIDINE 20 MG: 20 TABLET ORAL at 10:15

## 2024-10-17 RX ADMIN — CARVEDILOL 6.25 MG: 6.25 TABLET, FILM COATED ORAL at 17:23

## 2024-10-17 RX ADMIN — TRAZODONE HYDROCHLORIDE 50 MG: 50 TABLET ORAL at 21:23

## 2024-10-17 RX ADMIN — CYANOCOBALAMIN TAB 1000 MCG 1000 MCG: 1000 TAB at 10:15

## 2024-10-17 RX ADMIN — RISPERIDONE 2 MG: 2 TABLET, FILM COATED ORAL at 21:23

## 2024-10-17 RX ADMIN — CLOZAPINE 750 MG: 25 TABLET ORAL at 21:22

## 2024-10-17 RX ADMIN — Medication 15 ML: at 10:16

## 2024-10-17 RX ADMIN — VALPROIC ACID 1250 MG: 500 SOLUTION ORAL at 21:21

## 2024-10-17 RX ADMIN — FAMOTIDINE 20 MG: 20 TABLET ORAL at 17:23

## 2024-10-17 RX ADMIN — POLYETHYLENE GLYCOL 3350 17 G: 17 POWDER, FOR SOLUTION ORAL at 10:16

## 2024-10-17 RX ADMIN — MELATONIN TAB 3 MG 3 MG: 3 TAB at 21:22

## 2024-10-17 NOTE — NURSING NOTE
Meli isolates and not interacting with peers. Minimal interaction with staff. Patient remains paranoid and bizarre in her behavior. Patient denies psych s/s. Meli is medication compliant. Will continue to monitor and support during treatment.

## 2024-10-17 NOTE — PROGRESS NOTES
10/17/24 0900 10/17/24 1100 10/17/24 1330   Activity/Group Checklist   Group Exercise Nursing Education Life Skills   Attendance Attended Did not attend Did not attend   Attendance Duration (min) 16-30  --   --    Interactions Interacted appropriately  --   --    Affect/Mood Calm  --   --    Goals Achieved Able to engage in interactions;Able to listen to others  --   --

## 2024-10-17 NOTE — PLAN OF CARE
Problem: Prexisting or High Potential for Compromised Skin Integrity  Goal: Skin integrity is maintained or improved  Description: INTERVENTIONS:  - Identify patients at risk for skin breakdown  - Assess and monitor skin integrity  - Assess and monitor nutrition and hydration status  - Monitor labs   - Assess for incontinence   - Turn and reposition patient  - Assist with mobility/ambulation  - Relieve pressure over bony prominences  - Avoid friction and shearing  - Provide appropriate hygiene as needed including keeping skin clean and dry  - Evaluate need for skin moisturizer/barrier cream  - Collaborate with interdisciplinary team   - Patient/family teaching  - Consider wound care consult   Outcome: Progressing     Problem: Alteration in Thoughts and Perception  Goal: Refrain from acting on delusional thinking/internal stimuli  Description: Interventions:  - Monitor patient closely, per order   - Utilize least restrictive measures   - Set reasonable limits, give positive feedback for acceptable   - Administer medications as ordered and monitor of potential side effects  Outcome: Progressing  Goal: Complete daily ADLs, including personal hygiene independently, as able  Description: Interventions:  - Observe, teach, and assist patient with ADLS  - Monitor and promote a balance of rest/activity, with adequate nutrition and elimination   Outcome: Progressing     Problem: Ineffective Coping  Goal: Demonstrates healthy coping skills  Outcome: Not Progressing  Goal: Participates in unit activities  Description: Interventions:  - Provide therapeutic environment   - Provide required programming   - Redirect inappropriate behaviors   Outcome: Not Progressing  Goal: Free from restraint events  Description: - Utilize least restrictive measures   - Provide behavioral interventions   - Redirect inappropriate behaviors   Outcome: Progressing     Problem: Risk for Self Injury/Neglect  Goal: Refrain from harming  self  Description: Interventions:  - Monitor patient closely, per order  - Develop a trusting relationship  - Supervise medication ingestion, monitor effects and side effects   Outcome: Progressing     Problem: Depression  Goal: Refrain from isolation  Description: Interventions:  - Develop a trusting relationship   - Encourage socialization   Outcome: Not Progressing     Problem: Anxiety  Goal: Anxiety is at manageable level  Description: Interventions:  - Assess and monitor patient's anxiety level.   - Monitor for signs and symptoms (heart palpitations, chest pain, shortness of breath, headaches, nausea, feeling jumpy, restlessness, irritable, apprehensive).   - Collaborate with interdisciplinary team and initiate plan and interventions as ordered.  - [place] patient to unit/surroundings  - Explain treatment plan  - Encourage participation in care  - Encourage verbalization of concerns/fears  - Identify coping mechanisms  - Assist in developing anxiety-reducing skills  - Administer/offer alternative therapies  - Limit or eliminate stimulants  Outcome: Progressing     Problem: Potential for Falls  Goal: Patient will remain free of falls  Description: INTERVENTIONS:  - Educate patient on patient safety including physical limitations  - Instruct patient to call for assistance with activity   - Consult OT/PT to assist with strengthening/mobility   - Keep Call bell within reach  - Keep bed low and locked with side rails adjusted as appropriate  - Keep care items and personal belongings within reach  - Initiate and maintain comfort rounds  - Offer Toileting every 2 Hours, in advance of need  - Initiate/Maintain bed and chair alarm  - Obtain necessary fall risk management equipment: walker, wheelchair  - Apply yellow socks and bracelet for high fall risk patients  - Patient moved to room near nurses station  Outcome: Progressing     Problem: Nutrition/Hydration-ADULT  Goal: Nutrient/Hydration intake appropriate for  improving, restoring or maintaining nutritional needs  Description: Monitor and assess patient's nutrition/hydration status for malnutrition. Collaborate with interdisciplinary team and initiate plan and interventions as ordered.  Monitor patient's weight and dietary intake as ordered or per policy. Utilize nutrition screening tool and intervene as necessary. Determine patient's food preferences and provide high-protein, high-caloric foods as appropriate.     INTERVENTIONS:  - Monitor oral intake, urinary output, labs, and treatment plans  - Assess nutrition and hydration status and recommend course of action  - Evaluate amount of meals eaten  - Assist patient with eating if necessary   - Allow adequate time for meals  - Recommend/ encourage appropriate diets, oral nutritional supplements, and vitamin/mineral supplements  - Order, calculate, and assess calorie counts as needed  - Recommend, monitor, and adjust tube feedings and TPN/PPN based on assessed needs  - Assess need for intravenous fluids  - Provide specific nutrition/hydration education as appropriate  - Include patient/family/caregiver in decisions related to nutrition  Outcome: Progressing

## 2024-10-17 NOTE — NURSING NOTE
"Patient refuses bed alarm and states \"I am ok.\" \"I do not need it.\"  Will continue to monitor and support during treatment.  "

## 2024-10-17 NOTE — PROGRESS NOTES
10/17/24 0803   Team Meeting   Meeting Type Daily Rounds   Initial Conference Date 10/17/24   Next Conference Date 10/18/24   Team Members Present   Team Members Present Physician;Nurse;;   Physician Team Member Dr Banuelos, JAIR Goldman   Nursing Team Member Clarissa   Care Management Team Member Anum   Social Work Team Member Ayse   Patient/Family Present   Patient Present No   Patient's Family Present No     Discharge pending CRR, incontinence through night, maxing out Clozaril, irritable.

## 2024-10-17 NOTE — NURSING NOTE
Patient remains suspicious and irritable. Med and meal compliant. Listening to radio in bedroom. No [facility] preoccupation or inappropriate behaviors w/ males observed.

## 2024-10-17 NOTE — ASSESSMENT & PLAN NOTE
Continue Depakote to 1250 mg nightly  Most recent VPA level 54 on 10/12/24  CMP on 10/12/24 reviewed, WNL  Continue Clozaril 750 mg nightly for schizoaffective disorder  Clozaril monitoring:  CRP, CBC/diff, CK QMon for monitoring  VSS, without evidence of tachycardia  Most recent ECG reviewed 9/9/24 - normal ECG, NSR  Clozaril level 10/7 was 711, last ANC on 10/14 was 3.80; new Clozaril level to be obtained tomorrow 10/18/24 prior to Clozaril dose  Senna to prevent Clozaril induced constipation  Continue Risperdal to 2 mg qhs for ongoing psychosis   Two antipsychotics utilized secondary to multiple failures of monotherapy. Plan to decrease Risperdal, wean off & discontinue once Clozaril is maximized given ongoing psychosis  Continue Ativan 0.5 mg twice daily for anxiety   Continue melatonin 3 mg nightly for insomnia  Continue prazosin 2 mg nightly for PTSD associated nightmares   Continue trazodone 50 mg nightly for insomnia  Discharge disposition: consider CRR program

## 2024-10-17 NOTE — PROGRESS NOTES
Progress Note - Behavioral Health     Meli ESPINOZA Nowak 59 y.o. female MRN: [id number]   Unit/Bed#: [facility] 651-02 Encounter: [id number]  Assessment & Plan  Schizoaffective disorder, bipolar type (HCC)  Continue Depakote to 1250 mg nightly  Most recent VPA level 54 on 10/12/24  CMP on 10/12/24 reviewed, WNL  Continue Clozaril 750 mg nightly for schizoaffective disorder  Clozaril monitoring:  CRP, CBC/diff, CK QMon for monitoring  VSS, without evidence of tachycardia  Most recent ECG reviewed 9/9/24 - normal ECG, NSR  Clozaril level 10/7 was 711, last ANC on 10/14 was 3.80; new Clozaril level to be obtained tomorrow 10/18/24 prior to Clozaril dose  Senna to prevent Clozaril induced constipation  Continue Risperdal to 2 mg qhs for ongoing psychosis   Two antipsychotics utilized secondary to multiple failures of monotherapy. Plan to decrease Risperdal, wean off & discontinue once Clozaril is maximized given ongoing psychosis  Continue Ativan 0.5 mg twice daily for anxiety   Continue melatonin 3 mg nightly for insomnia  Continue prazosin 2 mg nightly for PTSD associated nightmares   Continue trazodone 50 mg nightly for insomnia  Discharge disposition: consider CRR program        Recommended Treatment:     Planned medication and treatment changes:    All current active medications have been reviewed  Encourage group therapy, milieu therapy and occupational therapy  Behavioral Health checks for safety monitoring      Current medications:  Current Facility-Administered Medications   Medication Dose Route Frequency Provider Last Rate    acetaminophen  650 mg Oral Q4H PRN JOSE ELIAS Figueroa      acetaminophen  650 mg Oral Q4H PRN JOSE ELIAS Figueroa      acetaminophen  975 mg Oral Q6H PRN JOSE ELIAS Figueroa      aluminum-magnesium hydroxide-simethicone  30 mL Oral Q4H PRN JOSE ELIAS Puri      Artificial Tears  1 drop Both Eyes Q6H PRN Dereje Banuelos MD      atorvastatin  10 mg Oral Daily JOSE ELIAS Figueroa       bisacodyl  10 mg Oral Daily PRN Kristen Logan, CRNP      bisacodyl  10 mg Rectal Daily PRN Marie Ziegler, CRNP      carvedilol  6.25 mg Oral BID With Meals Marie Ziegler, CRNP      cloZAPine  750 mg Oral HS Marie Ziegler, CRNP      cyanocobalamin  1,000 mcg Oral Daily Marie Ziegler, CRNP      famotidine  20 mg Oral BID Shan Fitzgerald DO      fluticasone  1 puff Inhalation Daily Marie Ziegler, CRNP      hydrOXYzine HCL  25 mg Oral Q6H PRN Max 4/day Marie Ziegler, CRNP      hydrOXYzine HCL  50 mg Oral Q6H PRN Max 4/day Marie Ziegler, CRNP      ipratropium-albuterol  3 mL Nebulization Q4H PRN Darin Lawton MD      LORazepam  0.5 mg Oral BID Marie Ziegler, CRNP      Or    LORazepam  0.5 mg Intramuscular BID Marie Ziegler, CRNP      LORazepam  1 mg Intramuscular Q6H PRN Max 3/day Marie Ziegler, CRNP      LORazepam  1 mg Oral Q6H PRN Max 3/day Marie Ziegler, CRNP      melatonin  3 mg Oral HS Marie Ziegler, CRNP      mirtazapine  7.5 mg Oral HS PRN Marie Ziegler, CRNP      Multivitamin  15 mL Oral Daily Marie Ziegler, CRNP      nicotine  1 patch Transdermal Daily Marie Ziegler, CRNP      OLANZapine  2.5 mg Oral Q6H PRN Dereje Banuelos MD      Or    OLANZapine  2.5 mg Intramuscular Q6H PRN Dereje Banuelos MD      OLANZapine  5 mg Oral Q6H PRN Dereje Banuelos MD      Or    OLANZapine  5 mg Intramuscular Q6H PRN Dereje Banuelos MD      ondansetron  4 mg Oral Q6H PRN Darin Lawton MD      polyethylene glycol  17 g Oral Daily Kristen Logan, CRNP      polyethylene glycol  17 g Oral Daily PRN Marie Ziegler, CRVALE      prazosin  2 mg Oral HS Marie Ziegler, CRNP      risperiDONE  2 mg Oral HS Marie Ziegler, CRNP      senna-docusate sodium  2 tablet Oral HS Rehana Borrego PA-C      traZODone  50 mg Oral HS Marie Ziegler, CRNP      valproic acid  1,250 mg Oral HS Marie Ziegler, JOSE ELIAS         Risks / Benefits of Treatment:    Risks, benefits, and possible side  "effects of medications explained to patient and patient verbalizes understanding and agreement for treatment.    Subjective:    Behavior over the last 24 hours: unchanged.     Meli was seen today in follow-up for continuation of care. Per staff, patient remains with an irritable edge and still with ongoing suspiciousness and paranoia.  Meli is seen resting In bed upside down, is bizarre, disheveled & psychotic appearing. She tells writer she, \"is not well\" but is refusing to give specifics and remains vague despite prompting. Staff note that she has been disorganized, asking for a  to come and perform a, \"spiritual marriage.\"  Meli adamantly denies suicidal/homicidal ideation in addition to thoughts of self-injury. Meli presently is contacting for safety on the unit and feels comfortable to confide in staff if thoughts arise. At time of interview, as mentioned above still with distortion, disorganized thought, fixed delusions, [employer] preoccupation & paranoia. Meli has been complaint with medications and tolerating without any side effects.       Sleep: normal  Appetite: normal  Medication side effects: No   ROS: no complaints    Mental Status Evaluation:    Appearance:  disheveled, psychotic appearing, dressed in hospital attire, laying upside down in bed   Behavior:  guarded, uncooperative, refusing to participate   Speech:  Scant, minimal, one-word answers   Mood:  depressed, \"unwell\"   Affect:  flat   Thought Process:  disorganized, illogical   Associations: tangential associations   Thought Content:  grandiose, paranoid, somatic, and fixed delusions, [employer] preoccupation   Perceptual Disturbances: appears responding to internal stimuli   Risk Potential: Suicidal ideation - None at present  Homicidal ideation - None at present  Potential for aggression - No   Sensorium:  oriented to person, place, and time/date   Memory:  recent and remote memory: unable to assess due to lack of cooperation "   Consciousness:  alert and awake   Attention/Concentration: attention span and concentration appear shorter than expected for age   Insight:  poor   Judgment: poor   Gait/Station: in bed   Motor Activity: no abnormal movements     Vital signs in last 24 hours:    Temp:  [97.6 °F (36.4 °C)-98.6 °F (37 °C)] 98.6 °F (37 °C)  HR:  [76-94] 94  BP: (116-152)/(57-78) 152/78  Resp:  [17-19] 19  SpO2:  [94 %-96 %] 96 %  O2 Device: None (Room air)    Laboratory results: I have personally reviewed all pertinent laboratory/tests results    Results from the past 24 hours: No results found for this or any previous visit (from the past 24 hour(s)).  Most Recent Labs:   Lab Results   Component Value Date    WBC 8.25 10/14/2024    RBC 4.19 10/14/2024    HGB 13.1 10/14/2024    HCT 39.2 10/14/2024     10/14/2024    RDW 14.4 10/14/2024    NEUTROABS 3.80 10/14/2024    SODIUM 138 10/12/2024    K 4.1 10/12/2024     10/12/2024    CO2 29 10/12/2024    BUN 18 10/12/2024    CREATININE 0.97 10/12/2024    GLUC 146 (H) 10/12/2024    CALCIUM 9.2 10/12/2024    AST 12 (L) 10/12/2024    ALT 12 10/12/2024    ALKPHOS 84 10/12/2024    TP 6.7 10/12/2024    ALB 3.9 10/12/2024    TBILI 0.26 10/12/2024    VALPROICTOT 54 10/12/2024    AMMONIA 32 07/03/2024    [id number] 2.000 07/24/2024    HGBA1C 6.4 (H) 05/03/2024     05/03/2024       Suicide/Homicide Risk Assessment:    Risk of Harm to Self:   Nursing Suicide Risk Assessment Last 24 hours: C-SSRS Risk (Since Last Contact)  Calculated C-SSRS Risk Score (Since Last Contact): No Risk Indicated    Risk of Harm to Others:  Nursing Homicide Risk Assessment: Violence Risk to Others: Denies within past 6 months    The following interventions are recommended: Behavioral Health checks for safety monitoring, continued hospitalization on locked unit    Progress Toward Goals: regressed    Counseling / Coordination of Care:    Total floor / unit time spent today 43 minutes. Greater than 50% of  total time was spent with the patient and / or family counseling and / or coordination of care. A description of counseling / coordination of care:    Rehana Borrego PA-C 10/17/24

## 2024-10-17 NOTE — NURSING NOTE
Patient is irritable and suspicious. Visible on milieu and social w/ select peers. Sitting next to male peer but no inappropriate behaviors noted. Appetite is excellent. Disheveled appearance. Needed encouragement for hygiene. No delusions or religiosity observed. Fall precautions in place. Continuous rounding maintained.

## 2024-10-18 LAB — CLOZAPINE SERPL-MCNC: 650 NG/ML (ref 350–900)

## 2024-10-18 PROCEDURE — 80159 DRUG ASSAY CLOZAPINE: CPT | Performed by: STUDENT IN AN ORGANIZED HEALTH CARE EDUCATION/TRAINING PROGRAM

## 2024-10-18 PROCEDURE — 99232 SBSQ HOSP IP/OBS MODERATE 35: CPT | Performed by: STUDENT IN AN ORGANIZED HEALTH CARE EDUCATION/TRAINING PROGRAM

## 2024-10-18 RX ADMIN — FAMOTIDINE 20 MG: 20 TABLET ORAL at 17:20

## 2024-10-18 RX ADMIN — NICOTINE 1 PATCH: 7 PATCH, EXTENDED RELEASE TRANSDERMAL at 09:01

## 2024-10-18 RX ADMIN — CARVEDILOL 6.25 MG: 6.25 TABLET, FILM COATED ORAL at 16:42

## 2024-10-18 RX ADMIN — ACETAMINOPHEN 650 MG: 325 TABLET ORAL at 13:48

## 2024-10-18 RX ADMIN — ATORVASTATIN CALCIUM 10 MG: 10 TABLET, FILM COATED ORAL at 09:02

## 2024-10-18 RX ADMIN — FAMOTIDINE 20 MG: 20 TABLET ORAL at 08:22

## 2024-10-18 RX ADMIN — LORAZEPAM 0.5 MG: 0.5 TABLET ORAL at 08:22

## 2024-10-18 RX ADMIN — VALPROIC ACID 1250 MG: 500 SOLUTION ORAL at 21:17

## 2024-10-18 RX ADMIN — TRAZODONE HYDROCHLORIDE 50 MG: 50 TABLET ORAL at 21:16

## 2024-10-18 RX ADMIN — CLOZAPINE 750 MG: 25 TABLET ORAL at 21:15

## 2024-10-18 RX ADMIN — POLYETHYLENE GLYCOL 3350 17 G: 17 POWDER, FOR SOLUTION ORAL at 09:01

## 2024-10-18 RX ADMIN — RISPERIDONE 2 MG: 2 TABLET, FILM COATED ORAL at 21:15

## 2024-10-18 RX ADMIN — Medication 15 ML: at 09:01

## 2024-10-18 RX ADMIN — CARVEDILOL 6.25 MG: 6.25 TABLET, FILM COATED ORAL at 09:03

## 2024-10-18 RX ADMIN — LORAZEPAM 0.5 MG: 0.5 TABLET ORAL at 17:20

## 2024-10-18 RX ADMIN — FLUTICASONE FUROATE 1 PUFF: 100 POWDER RESPIRATORY (INHALATION) at 09:02

## 2024-10-18 RX ADMIN — SENNOSIDES AND DOCUSATE SODIUM 2 TABLET: 8.6; 5 TABLET ORAL at 21:15

## 2024-10-18 RX ADMIN — MELATONIN TAB 3 MG 3 MG: 3 TAB at 21:16

## 2024-10-18 RX ADMIN — CYANOCOBALAMIN TAB 1000 MCG 1000 MCG: 1000 TAB at 09:02

## 2024-10-18 RX ADMIN — PRAZOSIN HYDROCHLORIDE 2 MG: 1 CAPSULE ORAL at 21:15

## 2024-10-18 NOTE — PROGRESS NOTES
10/18/24 1000 10/18/24 1100   Activity/Group Checklist   Group Community meeting Wellness  (body scan meditation)   Attendance Did not attend Did not attend

## 2024-10-18 NOTE — ASSESSMENT & PLAN NOTE
Continue Depakote to 1250 mg nightly  Most recent VPA level 54 on 10/12/24  CMP on 10/12/24 reviewed, WNL  Continue Clozaril 750 mg nightly for schizoaffective disorder  Clozaril monitoring:  CRP, CBC/diff, CK QMon for monitoring  VSS, without evidence of tachycardia  Most recent ECG reviewed 9/9/24 - normal ECG, NSR  Clozaril level 10/7 was 711, last ANC on 10/14 was 3.80; new Clozaril level to be obtained today 10/18/24 prior to Clozaril dose-adjust if indicated tomorrow  Senna to prevent Clozaril induced constipation  Continue Risperdal to 2 mg qhs for ongoing psychosis   Two antipsychotics utilized secondary to multiple failures of monotherapy. Plan to decrease Risperdal, wean off & discontinue once Clozaril is maximized given ongoing psychosis  Continue Ativan 0.5 mg twice daily for anxiety   Continue melatonin 3 mg nightly for insomnia  Continue prazosin 2 mg nightly for PTSD associated nightmares   Continue trazodone 50 mg nightly for insomnia  Discharge disposition: consider CRR program

## 2024-10-18 NOTE — PROGRESS NOTES
10/18/24 0832   Team Meeting   Meeting Type Daily Rounds   Initial Conference Date 10/18/24   Next Conference Date 10/21/24   Team Members Present   Team Members Present Physician;Nurse;;   Physician Team Member Dr Banuelos, Dr Alberto   Nursing Team Member Dona   Care Management Team Member Anum   Social Work Team Member Ayse   Patient/Family Present   Patient Present No   Patient's Family Present No

## 2024-10-18 NOTE — PROGRESS NOTES
Progress Note - Behavioral Health   Name: Meli Nowak 59 y.o. female I MRN: [id number]   Unit/Bed#: [facility] 651-02 I Date of Admission: 7/23/2024   Date of Service: 10/18/2024 I Hospital Day: 87         Assessment & Plan  Schizoaffective disorder, bipolar type (HCC)  Continue Depakote to 1250 mg nightly  Most recent VPA level 54 on 10/12/24  CMP on 10/12/24 reviewed, WNL  Continue Clozaril 750 mg nightly for schizoaffective disorder  Clozaril monitoring:  CRP, CBC/diff, CK QMon for monitoring  VSS, without evidence of tachycardia  Most recent ECG reviewed 9/9/24 - normal ECG, NSR  Clozaril level 10/7 was 711, last ANC on 10/14 was 3.80; new Clozaril level to be obtained today 10/18/24 prior to Clozaril dose-adjust if indicated tomorrow  Senna to prevent Clozaril induced constipation  Continue Risperdal to 2 mg qhs for ongoing psychosis   Two antipsychotics utilized secondary to multiple failures of monotherapy. Plan to decrease Risperdal, wean off & discontinue once Clozaril is maximized given ongoing psychosis  Continue Ativan 0.5 mg twice daily for anxiety   Continue melatonin 3 mg nightly for insomnia  Continue prazosin 2 mg nightly for PTSD associated nightmares   Continue trazodone 50 mg nightly for insomnia  Discharge disposition: consider CRR program            Recommended Treatment:   Treatment plan and medication changes discussed and per the attending physician the plan is:     1.Continue with group therapy, milieu therapy and occupational therapy  2.Behavioral Health checks every 15 minutes  3.Continue frequent safety checks and vitals per unit protocol  4.Continue with SLIM medical management as indicated  5.Continue with current medication regimen  6.Will review labs in the a.m.  7.Disposition Planning: Discharge planning and efforts remain ongoing     Planned medication and treatment changes:    All current active medications have been reviewed  Encourage group therapy, milieu therapy and occupational  therapy  Behavioral Health checks for safety monitoring  Discharge planning  Continue current medications:    Current medications:  Current Facility-Administered Medications   Medication Dose Route Frequency Provider Last Rate    acetaminophen  650 mg Oral Q4H PRN Marie Ziegler, CRNP      acetaminophen  650 mg Oral Q4H PRN Marie Ziegler, CRNP      acetaminophen  975 mg Oral Q6H PRN Marie Ziegler, CRNP      aluminum-magnesium hydroxide-simethicone  30 mL Oral Q4H PRN Parris Bolanos, CHRISTOPHERNP      Artificial Tears  1 drop Both Eyes Q6H PRN Dereje Banuelos MD      atorvastatin  10 mg Oral Daily Marie Ziegler, CRNP      bisacodyl  10 mg Oral Daily PRN Kristen Logan, CRNP      bisacodyl  10 mg Rectal Daily PRN Marie Ziegler, CRNP      carvedilol  6.25 mg Oral BID With Meals Marie Ziegler, CRNP      cloZAPine  750 mg Oral HS Marie Ziegler, CRNP      cyanocobalamin  1,000 mcg Oral Daily Marie Ziegler, CRNP      famotidine  20 mg Oral BID Shan Fitzgerald,       fluticasone  1 puff Inhalation Daily Marie Ziegler, CRNP      hydrOXYzine HCL  25 mg Oral Q6H PRN Max 4/day Marie Ziegler, CRNP      hydrOXYzine HCL  50 mg Oral Q6H PRN Max 4/day Marie Ziegler, CRNP      ipratropium-albuterol  3 mL Nebulization Q4H PRN Darin Lawton MD      LORazepam  0.5 mg Oral BID Marie Ziegler, CRNP      Or    LORazepam  0.5 mg Intramuscular BID Marie Ziegler, CRNP      LORazepam  1 mg Intramuscular Q6H PRN Max 3/day Marie Ziegler, CRNP      LORazepam  1 mg Oral Q6H PRN Max 3/day Marie Ziegler, CHRISTOPHERNP      melatonin  3 mg Oral HS Marie Ziegler, CRNP      mirtazapine  7.5 mg Oral HS PRN Marie Ziegler, CRNP      Multivitamin  15 mL Oral Daily Marie Ziegler, CRNP      nicotine  1 patch Transdermal Daily Marie Ziegler, CHRISTOPHERNP      OLANZapine  2.5 mg Oral Q6H PRN Dereje Banuelos MD      Or    OLANZapine  2.5 mg Intramuscular Q6H PRN Dereje Banuelos MD      OLANZapine  5 mg Oral Q6H PRN Dereje Banuelos MD    "   Or    OLANZapine  5 mg Intramuscular Q6H PRN Dereje Banuelos MD      ondansetron  4 mg Oral Q6H PRN Darin Lawton MD      polyethylene glycol  17 g Oral Daily Kristen JOSE ELIAS Meyer      polyethylene glycol  17 g Oral Daily PRN JOSE ELIAS Figueroa      prazosin  2 mg Oral HS JOSE ELIAS Figueroa      risperiDONE  2 mg Oral HS JOSE ELIAS Figueroa      senna-docusate sodium  2 tablet Oral HS Rehana Borrego PA-C      traZODone  50 mg Oral HS JOSE ELIAS Figueroa      valproic acid  1,250 mg Oral HS JOSE ELIAS Figueroa         Risks / Benefits of Treatment:    Risks, benefits, and possible side effects of medications explained to patient and patient verbalizes understanding and agreement for treatment.    Subjective:    Behavior over the last 24 hours: slowly improving.     Per staff, Meli with some incontinence in the evening.  She continues to be bizarre, paranoid and suspicious.  Clozaril level for tonight.      Meli was seen in the small TV room today for psychiatric follow up.  She reports her mood is \"good\".  When asked directly about suspicion of her medications last night she denies that she is paranoid. She states she forgets what medications she takes sometimes and wants reassurance.  We discussed possibly increasing Clozaril tomorrow pending lab work, she is agreeable.  She reports daily BMs.  She denies side effects from medications and none observed.  She denies suicidal or homicidal ideation, intent or plan.  She denies AH/VH but appear distracted at times.     Sleep: normal  Appetite: normal  Medication side effects: No   ROS: no complaints    Mental Status Evaluation:    Appearance:  disheveled, marginal hygiene   Behavior:  cooperative, calm, bizarre   Speech:  normal rate and volume   Mood:  \"Good\"   Affect:  brighter   Thought Process:  disorganized, slowing of thoughts   Associations: circumstantial associations   Thought Content:  paranoid and bizarre delusions   Perceptual " Disturbances: denies auditory or visual hallucinations when asked, appears distracted   Risk Potential: Suicidal ideation - None  Homicidal ideation - None  Potential for aggression - Not at present   Sensorium:  oriented to person and situation   Memory:  recent memory intact   Consciousness:  alert and awake   Attention/Concentration: attention span and concentration appear shorter than expected for age   Insight:  limited   Judgment: limited   Gait/Station: normal gait/station   Motor Activity: no abnormal movements     Vital signs in last 24 hours:    Temp:  [97.9 °F (36.6 °C)-98.4 °F (36.9 °C)] 97.9 °F (36.6 °C)  HR:  [76-88] 88  BP: (111-181)/(59-87) 111/59  Resp:  [18] 18  SpO2:  [92 %-95 %] 95 %  O2 Device: None (Room air)         Laboratory results: I have personally reviewed all pertinent laboratory/tests results    Results from the past 24 hours: No results found for this or any previous visit (from the past 24 hour(s)).  Most Recent Labs:   Lab Results   Component Value Date    WBC 8.25 10/14/2024    RBC 4.19 10/14/2024    HGB 13.1 10/14/2024    HCT 39.2 10/14/2024     10/14/2024    RDW 14.4 10/14/2024    NEUTROABS 3.80 10/14/2024    SODIUM 138 10/12/2024    K 4.1 10/12/2024     10/12/2024    CO2 29 10/12/2024    BUN 18 10/12/2024    CREATININE 0.97 10/12/2024    GLUC 146 (H) 10/12/2024    CALCIUM 9.2 10/12/2024    AST 12 (L) 10/12/2024    ALT 12 10/12/2024    ALKPHOS 84 10/12/2024    TP 6.7 10/12/2024    ALB 3.9 10/12/2024    TBILI 0.26 10/12/2024    VALPROICTOT 54 10/12/2024    AMMONIA 32 07/03/2024    MNX7GNYATPML 2.000 07/24/2024    HGBA1C 6.4 (H) 05/03/2024     05/03/2024       Suicide/Homicide Risk Assessment:    Risk of Harm to Self:   Nursing Suicide Risk Assessment Last 24 hours: C-SSRS Risk (Since Last Contact)  Calculated C-SSRS Risk Score (Since Last Contact): No Risk Indicated  Current Specific Risk Factors include: mental illness diagnosis  Protective Factors: no current  suicidal ideation, ability to communicate with staff on the unit, able to contract for safety on the unit, taking medications as ordered on the unit, improved psychotic symptoms  Based on today's assessment, [place] presents the following risk of harm to self: low    Risk of Harm to Others:  Nursing Homicide Risk Assessment: Violence Risk to Others: Denies within past 6 months  Current Specific Risk Factors include: behavior suggesting impulsivity, current psychotic symptoms, social difficulties  Protective Factors: no current homicidal ideation, able to communicate with staff on the unit, impulse control is improving, psychotic symptoms are less prominent, compliant with medications on the unit as ordered, follows staff redirection  Based on today's assessment, [place] presents the following risk of harm to others: low    The following interventions are recommended: Behavioral Health checks for safety monitoring, continued hospitalization on locked unit    Progress Toward Goals: progressing slowly    Counseling / Coordination of Care:    Total floor / unit time spent today 15 minutes. Greater than 50% of total time was spent with the patient and / or family counseling and / or coordination of care. A description of counseling / coordination of care:  Patient's progress discussed with staff in treatment team meeting.    Administrative Statements   I have spent a total time of 30 minutes in caring for this patient on the day of the visit/encounter including Diagnostic results, Documenting in the medical record, Reviewing / ordering tests, medicine, procedures  , Obtaining or reviewing history  , and Communicating with other healthcare professionals .    JOSE ELIAS Malik 10/18/24

## 2024-10-18 NOTE — PLAN OF CARE
Problem: Prexisting or High Potential for Compromised Skin Integrity  Goal: Skin integrity is maintained or improved  Description: INTERVENTIONS:  - Identify patients at risk for skin breakdown  - Assess and monitor skin integrity  - Assess and monitor nutrition and hydration status  - Monitor labs   - Assess for incontinence   - Turn and reposition patient  - Assist with mobility/ambulation  - Relieve pressure over bony prominences  - Avoid friction and shearing  - Provide appropriate hygiene as needed including keeping skin clean and dry  - Evaluate need for skin moisturizer/barrier cream  - Collaborate with interdisciplinary team   - Patient/family teaching  - Consider wound care consult   Outcome: Progressing     Problem: Alteration in Thoughts and Perception  Goal: Verbalize thoughts and feelings  Description: Interventions:  - Promote a nonjudgmental and trusting relationship with the patient through active listening and therapeutic communication  - Assess patient's level of functioning, behavior and potential for risk  - Engage patient in 1 on 1 interactions  - Encourage patient to express fears, feelings, frustrations, and discuss symptoms    - [place] patient to reality, help patient recognize reality-based thinking   - Administer medications as ordered and assess for potential side effects  - Provide the patient education related to the signs and symptoms of the illness and desired effects of prescribed medications  Outcome: Progressing  Goal: Refrain from acting on delusional thinking/internal stimuli  Description: Interventions:  - Monitor patient closely, per order   - Utilize least restrictive measures   - Set reasonable limits, give positive feedback for acceptable   - Administer medications as ordered and monitor of potential side effects  Outcome: Progressing     Problem: Ineffective Coping  Goal: Free from restraint events  Description: - Utilize least restrictive measures   - Provide behavioral  interventions   - Redirect inappropriate behaviors   Outcome: Progressing     Problem: Risk for Self Injury/Neglect  Goal: Refrain from harming self  Description: Interventions:  - Monitor patient closely, per order  - Develop a trusting relationship  - Supervise medication ingestion, monitor effects and side effects   Outcome: Progressing     Problem: Depression  Goal: Treatment Goal: Demonstrate behavioral control of depressive symptoms, verbalize feelings of improved mood/affect, and adopt new coping skills prior to discharge  Outcome: Progressing     Problem: Anxiety  Goal: Anxiety is at manageable level  Description: Interventions:  - Assess and monitor patient's anxiety level.   - Monitor for signs and symptoms (heart palpitations, chest pain, shortness of breath, headaches, nausea, feeling jumpy, restlessness, irritable, apprehensive).   - Collaborate with interdisciplinary team and initiate plan and interventions as ordered.  - [place] patient to unit/surroundings  - Explain treatment plan  - Encourage participation in care  - Encourage verbalization of concerns/fears  - Identify coping mechanisms  - Assist in developing anxiety-reducing skills  - Administer/offer alternative therapies  - Limit or eliminate stimulants  Outcome: Progressing     Problem: Potential for Falls  Goal: Patient will remain free of falls  Description: INTERVENTIONS:  - Educate patient on patient safety including physical limitations  - Instruct patient to call for assistance with activity   - Consult OT/PT to assist with strengthening/mobility   - Keep Call bell within reach  - Keep bed low and locked with side rails adjusted as appropriate  - Keep care items and personal belongings within reach  - Initiate and maintain comfort rounds  - Offer Toileting every 2 Hours, in advance of need  - Initiate/Maintain bed and chair alarm  - Obtain necessary fall risk management equipment: walker, wheelchair  - Apply yellow socks and bracelet for  high fall risk patients  - Patient moved to room near nurses station  Outcome: Progressing     Problem: Nutrition/Hydration-ADULT  Goal: Nutrient/Hydration intake appropriate for improving, restoring or maintaining nutritional needs  Description: Monitor and assess patient's nutrition/hydration status for malnutrition. Collaborate with interdisciplinary team and initiate plan and interventions as ordered.  Monitor patient's weight and dietary intake as ordered or per policy. Utilize nutrition screening tool and intervene as necessary. Determine patient's food preferences and provide high-protein, high-caloric foods as appropriate.     INTERVENTIONS:  - Monitor oral intake, urinary output, labs, and treatment plans  - Assess nutrition and hydration status and recommend course of action  - Evaluate amount of meals eaten  - Assist patient with eating if necessary   - Allow adequate time for meals  - Recommend/ encourage appropriate diets, oral nutritional supplements, and vitamin/mineral supplements  - Order, calculate, and assess calorie counts as needed  - Recommend, monitor, and adjust tube feedings and TPN/PPN based on assessed needs  - Assess need for intravenous fluids  - Provide specific nutrition/hydration education as appropriate  - Include patient/family/caregiver in decisions related to nutrition  Outcome: Progressing

## 2024-10-18 NOTE — PROGRESS NOTES
10/18/24 1100   Activity/Group Checklist   Group Music Therapy   Attendance Attended   Attendance Duration (min) 0-15   Interactions Interacted appropriately   Affect/Mood Calm   Goals Achieved Able to listen to others

## 2024-10-18 NOTE — TREATMENT PLAN
TREATMENT PLAN REVIEW - Behavioral Health Meli Nowak 59 y.o. 1965 female MRN: [id number]    [facility] SH 6B [facility] Room / Bed: [facility] 6[facility] Encounter: [id number]          Admit Date/Time:  7/23/2024  1:27 PM    Treatment Team:   MD Marie Carter CRNP Natividad Rios Destiny Bracero Michael Micek, RN Ethan Yerg Jacqueline Almonte Terry L [place], COTA    Diagnosis: Principal Problem:    Schizoaffective disorder, bipolar type (HCC)  Active Problems:    Medical clearance for psychiatric admission    Type 2 diabetes mellitus (HCC)    Obesity    Tobacco abuse    Hyperlipidemia    Esophageal reflux    Essential (primary) hypertension    Vitamin B12 deficiency    Hypoxia    Ambulatory dysfunction    Mild protein-calorie malnutrition (HCC)      Patient Strengths/Assets: negotiates basic needs    Patient Barriers/Limitations: difficulty adapting, lack of social/family support, limited support system, patient is on an involuntary commitment, poor insight, poor past treatment response, poor reasoning ability, poor self-care    Short Term Goals: decrease in paranoid thoughts, decrease in psychotic symptoms, ability to stay safe on the unit, ability to stay free of restraints, improvement in ability to express basic needs, improvement in reasoning ability, sleep improvement, mood stabilization, increase in socialization with peers on the unit    Long Term Goals: improvement in depression, improvement in anxiety, stabilization of mood, free of suicidal thoughts, free of homicidal thoughts, resolution of psychotic symptoms, improvement in reality testing, improvement in reasoning ability, improved insight, able to express basic needs, adequate self care, adequate sleep, adequate oral intake, appropriate interaction with peers, stable living arrangements upon discharge    Progress Towards Goals: continue psychiatric medications as prescribed, discharge  planning    Recommended Treatment: medication management, patient medication education, group therapy, milieu therapy, continued Behavioral Health psychiatric evaluation/assessment process    Treatment Frequency: daily medication monitoring, group and milieu therapy daily, monitoring through interdisciplinary rounds, monitoring through weekly patient care conferences    Expected Discharge Date:  30 days    Discharge Plan: placement in alternative living arrangement, referral for outpatient medication management with a psychiatrist, referral for outpatient psychotherapy    Treatment Plan Created/Updated By: Dereje Banuelos MD

## 2024-10-18 NOTE — NURSING NOTE
Meli is isolative but will come out of room after suggested. Meli denies any anxiety, depression or SI but remains with a new somatic complaint with each interaction. Meli is medication compliant but takes pills 1 at a time, requiring much water. Mouth checks completed and patient is cooperative. Meli declined changing into pajamas. Emotional support with reassurance provided often since she remains suspicious and paranoid. Will continue to monitor and support during treatment.

## 2024-10-18 NOTE — TREATMENT TEAM
10/18/24 0835   Team Meeting   Meeting Type Daily Rounds   Team Members Present   Team Members Present Nurse;Physician;   Physician Team Member Dr. Banuelos   Nursing Team Member QUINTON Mckenzie   Care Management Team Member ADÁN James     Patient continues with paranoid, suspicious, bizarre behaviors, started with incontinence.  Questioning if medications given at medication pass are street drugs.  Discharge plan to [facility].

## 2024-10-19 PROCEDURE — 99232 SBSQ HOSP IP/OBS MODERATE 35: CPT

## 2024-10-19 RX ORDER — CLOZAPINE 200 MG/1
800 TABLET ORAL
Status: DISCONTINUED | OUTPATIENT
Start: 2024-10-19 | End: 2024-10-22

## 2024-10-19 RX ADMIN — MELATONIN TAB 3 MG 3 MG: 3 TAB at 21:17

## 2024-10-19 RX ADMIN — ACETAMINOPHEN 975 MG: 325 TABLET, FILM COATED ORAL at 03:55

## 2024-10-19 RX ADMIN — FAMOTIDINE 20 MG: 20 TABLET ORAL at 09:01

## 2024-10-19 RX ADMIN — Medication 15 ML: at 09:00

## 2024-10-19 RX ADMIN — LORAZEPAM 0.5 MG: 0.5 TABLET ORAL at 09:00

## 2024-10-19 RX ADMIN — ATORVASTATIN CALCIUM 10 MG: 10 TABLET, FILM COATED ORAL at 09:01

## 2024-10-19 RX ADMIN — ACETAMINOPHEN 975 MG: 325 TABLET, FILM COATED ORAL at 17:12

## 2024-10-19 RX ADMIN — CYANOCOBALAMIN TAB 1000 MCG 1000 MCG: 1000 TAB at 09:01

## 2024-10-19 RX ADMIN — LORAZEPAM 0.5 MG: 0.5 TABLET ORAL at 17:08

## 2024-10-19 RX ADMIN — PRAZOSIN HYDROCHLORIDE 2 MG: 1 CAPSULE ORAL at 21:17

## 2024-10-19 RX ADMIN — VALPROIC ACID 1250 MG: 500 SOLUTION ORAL at 21:17

## 2024-10-19 RX ADMIN — ONDANSETRON 4 MG: 4 TABLET, ORALLY DISINTEGRATING ORAL at 17:08

## 2024-10-19 RX ADMIN — FLUTICASONE FUROATE 1 PUFF: 100 POWDER RESPIRATORY (INHALATION) at 09:03

## 2024-10-19 RX ADMIN — SENNOSIDES AND DOCUSATE SODIUM 2 TABLET: 8.6; 5 TABLET ORAL at 21:17

## 2024-10-19 RX ADMIN — TRAZODONE HYDROCHLORIDE 50 MG: 50 TABLET ORAL at 21:17

## 2024-10-19 RX ADMIN — FAMOTIDINE 20 MG: 20 TABLET ORAL at 17:12

## 2024-10-19 RX ADMIN — CARVEDILOL 6.25 MG: 6.25 TABLET, FILM COATED ORAL at 09:00

## 2024-10-19 RX ADMIN — CLOZAPINE 800 MG: 200 TABLET ORAL at 21:17

## 2024-10-19 RX ADMIN — RISPERIDONE 2 MG: 2 TABLET, FILM COATED ORAL at 21:17

## 2024-10-19 RX ADMIN — POLYETHYLENE GLYCOL 3350 17 G: 17 POWDER, FOR SOLUTION ORAL at 09:00

## 2024-10-19 RX ADMIN — CARVEDILOL 6.25 MG: 6.25 TABLET, FILM COATED ORAL at 17:08

## 2024-10-19 NOTE — PROGRESS NOTES
Progress Note - Behavioral Health   Name: Meli Nowak 59 y.o. female I MRN: [id number]   Unit/Bed#: [facility] 651-02 I Date of Admission: 7/23/2024   Date of Service: 10/19/2024 I Hospital Day: 88         Assessment & Plan  Schizoaffective disorder, bipolar type (HCC)  Continue Depakote to 1250 mg nightly  Most recent VPA level 54 on 10/12/24  CMP on 10/12/24 reviewed, WNL  Increase Clozaril to 800 mg nightly for schizoaffective disorder  Clozaril monitoring:  CRP, CBC/diff, CK QMon for monitoring  VSS, without evidence of tachycardia  Most recent ECG reviewed 9/9/24 - normal ECG, NSR  Clozaril level 10/7 was 711, last ANC on 10/14 was 3.80; new Clozaril level 650 on 10/18/24, increasing Clozaril to 800mg PO at HS   Senna to prevent Clozaril induced constipation  Continue Risperdal to 2 mg qhs for ongoing psychosis   Two antipsychotics utilized secondary to multiple failures of monotherapy. Plan to decrease Risperdal, wean off & discontinue once Clozaril is maximized given ongoing psychosis  Continue Ativan 0.5 mg twice daily for anxiety   Continue melatonin 3 mg nightly for insomnia  Continue prazosin 2 mg nightly for PTSD associated nightmares   Continue trazodone 50 mg nightly for insomnia  Discharge disposition: consider CRR program            Recommended Treatment:   Treatment plan and medication changes discussed and per the attending physician the plan is:     1.Continue with group therapy, milieu therapy and occupational therapy  2.Behavioral Health checks every 15 minutes  3.Continue frequent safety checks and vitals per unit protocol  4.Continue with [facility] medical management as indicated  5.Continue with current medication regimen  6.Will review labs in the a.m.  7.Disposition Planning: Discharge planning and efforts remain ongoing     Planned medication and treatment changes:    All current active medications have been reviewed  Encourage group therapy, milieu therapy and occupational therapy  Behavioral  Health checks for safety monitoring  Discharge planning  Increase Clozaril to 800mg PO at HS for psychosis    Current medications:  Current Facility-Administered Medications   Medication Dose Route Frequency Provider Last Rate    acetaminophen  650 mg Oral Q4H PRN Marie Ziegler, CRNP      acetaminophen  650 mg Oral Q4H PRN Marie Ziegler, CRNP      acetaminophen  975 mg Oral Q6H PRN Marie Ziegler, JOSE ELIAS      aluminum-magnesium hydroxide-simethicone  30 mL Oral Q4H PRN Parris Bolanos, CHRISTOPHERNP      Artificial Tears  1 drop Both Eyes Q6H PRN Dereje Banuelos MD      atorvastatin  10 mg Oral Daily Marie Ziegler, CRNP      bisacodyl  10 mg Oral Daily PRN Kristen Logan, CRNP      bisacodyl  10 mg Rectal Daily PRN Marie Ziegler, CRNP      carvedilol  6.25 mg Oral BID With Meals Marie Ziegler, CRNP      cloZAPine  800 mg Oral HS Tena Long, CRNP      cyanocobalamin  1,000 mcg Oral Daily Marie Ziegler, CRNP      famotidine  20 mg Oral BID Shan Fitzgerald DO      fluticasone  1 puff Inhalation Daily Marie Ziegler, JOSE ELIAS      hydrOXYzine HCL  25 mg Oral Q6H PRN Max 4/day Marie Ziegler, CRNP      hydrOXYzine HCL  50 mg Oral Q6H PRN Max 4/day Marie Ziegler, JOSE ELIAS      ipratropium-albuterol  3 mL Nebulization Q4H PRN Darin Lawton MD      LORazepam  0.5 mg Oral BID Marie Ziegler, JOSE ELIAS      Or    LORazepam  0.5 mg Intramuscular BID Marie Ziegler, JOSE ELIAS      LORazepam  1 mg Intramuscular Q6H PRN Max 3/day Marie Ziegler, JOSE ELIAS      LORazepam  1 mg Oral Q6H PRN Max 3/day Marie Ziegler, JOSE ELIAS      melatonin  3 mg Oral HS Marie Ziegler, CRNP      mirtazapine  7.5 mg Oral HS PRN Marie Ziegler, JOSE ELIAS      Multivitamin  15 mL Oral Daily Marie Ziegler, JOSE ELIAS      nicotine  1 patch Transdermal Daily Marie Ziegler, CHRISTOPHERNP      OLANZapine  2.5 mg Oral Q6H PRN Dereje Banuelos MD      Or    OLANZapine  2.5 mg Intramuscular Q6H PRN Dereje Banuelos MD      OLANZapine  5 mg Oral Q6H PRN Dereje Banuelos  "MD      Or    OLANZapine  5 mg Intramuscular Q6H PRN Dereje Banuelos MD      ondansetron  4 mg Oral Q6H PRN Darin Lawton MD      polyethylene glycol  17 g Oral Daily JOSE ELIAS Ortega      polyethylene glycol  17 g Oral Daily PRN JOSE ELIAS Figueroa      prazosin  2 mg Oral HS JOSE ELIAS Figueroa      risperiDONE  2 mg Oral HS JOSE ELIAS Figueroa      senna-docusate sodium  2 tablet Oral HS Rehana Borrego PA-C      traZODone  50 mg Oral HS Marie Ziegler, JOSE ELIAS      valproic acid  1,250 mg Oral HS JOSE ELIAS Figueroa         Risks / Benefits of Treatment:    Risks, benefits, and possible side effects of medications explained to patient and patient verbalizes understanding and agreement for treatment.    Subjective:    Behavior over the last 24 hours: slowly improving.     Per staff, Meli has been mildly restless and anxious on the unit.  She has been meal and medication compliant.      Meli was seen in the group room away from peers today for psychiatric follow up.  She continue to be tangential and rambles at times.  She reports she has been showering, eating her meals.  She has been having a BM daily.  She feels her mood is \"good\".  Agreeable to titration of Clozaril for further mood stabilization and psychosis.  She denies suicidal or homicidal ideation intent or plan.  She denies AH/VH but continues to appear distracted at times.     Sleep: normal  Appetite: normal  Medication side effects: No   ROS: no complaints    Mental Status Evaluation:    Appearance:  disheveled, marginal hygiene   Behavior:  cooperative, calm, bizarre   Speech:  normal rate and volume   Mood:  \"Good\" , anxious   Affect:  bright   Thought Process:  disorganized, tangential   Associations: tangential associations   Thought Content:  paranoid and bizarre delusions   Perceptual Disturbances: denies auditory or visual hallucinations when asked, appears distracted   Risk Potential: Suicidal ideation - " None  Homicidal ideation - None  Potential for aggression - Not at present   Sensorium:  oriented to person and situation   Memory:  recent memory intact   Consciousness:  alert and awake   Attention/Concentration: attention span and concentration appear shorter than expected for age   Insight:  limited   Judgment: limited   Gait/Station: normal gait/station   Motor Activity: no abnormal movements     Vital signs in last 24 hours:    Temp:  [97.2 °F (36.2 °C)-97.8 °F (36.6 °C)] 97.6 °F (36.4 °C)  HR:  [68-76] 76  BP: (130-151)/(62-75) 136/75  Resp:  [17-18] 17  SpO2:  [95 %-97 %] 95 %  O2 Device: None (Room air)         Laboratory results: I have personally reviewed all pertinent laboratory/tests results    Results from the past 24 hours:   Recent Results (from the past 24 hour(s))   Clozapine[facility]    Collection Time: 10/18/24  7:36 PM   Result Value Ref Range    Clozapine Lvl 650 350 - 900 ng/mL     Most Recent Labs:   Lab Results   Component Value Date    WBC 8.25 10/14/2024    RBC 4.19 10/14/2024    HGB 13.1 10/14/2024    HCT 39.2 10/14/2024     10/14/2024    RDW 14.4 10/14/2024    NEUTROABS 3.80 10/14/2024    SODIUM 138 10/12/2024    K 4.1 10/12/2024     10/12/2024    CO2 29 10/12/2024    BUN 18 10/12/2024    CREATININE 0.97 10/12/2024    GLUC 146 (H) 10/12/2024    CALCIUM 9.2 10/12/2024    AST 12 (L) 10/12/2024    ALT 12 10/12/2024    ALKPHOS 84 10/12/2024    TP 6.7 10/12/2024    ALB 3.9 10/12/2024    TBILI 0.26 10/12/2024    VALPROICTOT 54 10/12/2024    AMMONIA 32 07/03/2024    [id number] 2.000 07/24/2024    HGBA1C 6.4 (H) 05/03/2024     05/03/2024       Suicide/Homicide Risk Assessment:    Risk of Harm to Self:   Nursing Suicide Risk Assessment Last 24 hours: C-SSRS Risk (Since Last Contact)  Calculated C-SSRS Risk Score (Since Last Contact): No Risk Indicated  Current Specific Risk Factors include: mental illness diagnosis  Protective Factors: no current suicidal ideation, ability to  communicate with staff on the unit, able to contract for safety on the unit, taking medications as ordered on the unit, improved psychotic symptoms  Based on today's assessment, Meli presents the following risk of harm to self: low    Risk of Harm to Others:  Nursing Homicide Risk Assessment: Violence Risk to Others: Denies within past 6 months  Current Specific Risk Factors include: behavior suggesting impulsivity, current psychotic symptoms, social difficulties  Protective Factors: no current homicidal ideation, able to communicate with staff on the unit, impulse control is improving, psychotic symptoms are less prominent, compliant with medications on the unit as ordered, follows staff redirection  Based on today's assessment, [place] presents the following risk of harm to others: low    The following interventions are recommended: Behavioral Health checks for safety monitoring, continued hospitalization on locked unit    Progress Toward Goals: progressing slowly    Counseling / Coordination of Care:    Total floor / unit time spent today 15 minutes. Greater than 50% of total time was spent with the patient and / or family counseling and / or coordination of care. A description of counseling / coordination of care:  Patient's progress discussed with staff in treatment team meeting.    Administrative Statements   I have spent a total time of 30 minutes in caring for this patient on the day of the visit/encounter including Diagnostic results, Documenting in the medical record, Reviewing / ordering tests, medicine, procedures  , Obtaining or reviewing history  , and Communicating with other healthcare professionals .    JOSE ELIAS Mlaik 10/19/24

## 2024-10-19 NOTE — NURSING NOTE
"Patient C/O 10/10 pain right foot. PRN Tylenol 975 mg given at 1712. Patient states, \"It won't work but I'll try it.\" Will reassess per unit protocol.  "

## 2024-10-19 NOTE — NURSING NOTE
Patient requested and was given tylenol 975 for right foot great toe pain at 0355. No redness or swelling noted. Will cont to monitor.

## 2024-10-19 NOTE — NURSING NOTE
Patient lying in bed with eyes closed. No S/S of discomfort or distress at this time. PRN Tylenol given at 1712 effective.

## 2024-10-19 NOTE — NURSING NOTE
"Patient awake and alert. Endorsed pain in right foot but declined PRN Tylenol stating, \"It doesn't work.\" Denies anxiety, depression, SI/HI/AH/VH. Patient states, \"I feel great!\" However, patient appears mildly anxious and restless. Cooperative with medications. Denies unmet needs at this time.  "

## 2024-10-19 NOTE — PLAN OF CARE
Problem: Alteration in Thoughts and Perception  Goal: Treatment Goal: Gain control of psychotic behaviors/thinking, reduce/eliminate presenting symptoms and demonstrate improved reality functioning upon discharge  Outcome: Progressing     Problem: Alteration in Thoughts and Perception  Goal: Verbalize thoughts and feelings  Description: Interventions:  - Promote a nonjudgmental and trusting relationship with the patient through active listening and therapeutic communication  - Assess patient's level of functioning, behavior and potential for risk  - Engage patient in 1 on 1 interactions  - Encourage patient to express fears, feelings, frustrations, and discuss symptoms    - [place] patient to reality, help patient recognize reality-based thinking   - Administer medications as ordered and assess for potential side effects  - Provide the patient education related to the signs and symptoms of the illness and desired effects of prescribed medications  Outcome: Progressing     Problem: Ineffective Coping  Goal: Demonstrates healthy coping skills  Outcome: Progressing

## 2024-10-19 NOTE — ASSESSMENT & PLAN NOTE
Continue Depakote to 1250 mg nightly  Most recent VPA level 54 on 10/12/24  CMP on 10/12/24 reviewed, WNL  Increase Clozaril to 800 mg nightly for schizoaffective disorder  Clozaril monitoring:  CRP, CBC/diff, CK QMon for monitoring  VSS, without evidence of tachycardia  Most recent ECG reviewed 9/9/24 - normal ECG, NSR  Clozaril level 10/7 was 711, last ANC on 10/14 was 3.80; new Clozaril level 650 on 10/18/24, increasing Clozaril to 800mg PO at HS   Senna to prevent Clozaril induced constipation  Continue Risperdal to 2 mg qhs for ongoing psychosis   Two antipsychotics utilized secondary to multiple failures of monotherapy. Plan to decrease Risperdal, wean off & discontinue once Clozaril is maximized given ongoing psychosis  Continue Ativan 0.5 mg twice daily for anxiety   Continue melatonin 3 mg nightly for insomnia  Continue prazosin 2 mg nightly for PTSD associated nightmares   Continue trazodone 50 mg nightly for insomnia  Discharge disposition: consider CRR program

## 2024-10-20 VITALS
DIASTOLIC BLOOD PRESSURE: 72 MMHG | BODY MASS INDEX: 33.53 KG/M2 | RESPIRATION RATE: 16 BRPM | SYSTOLIC BLOOD PRESSURE: 145 MMHG | TEMPERATURE: 98 F | HEIGHT: 67 IN | WEIGHT: 213.6 LBS | OXYGEN SATURATION: 96 % | HEART RATE: 77 BPM

## 2024-10-20 PROCEDURE — 99232 SBSQ HOSP IP/OBS MODERATE 35: CPT | Performed by: PSYCHIATRY & NEUROLOGY

## 2024-10-20 RX ADMIN — RISPERIDONE 2 MG: 2 TABLET, FILM COATED ORAL at 21:11

## 2024-10-20 RX ADMIN — ATORVASTATIN CALCIUM 10 MG: 10 TABLET, FILM COATED ORAL at 08:53

## 2024-10-20 RX ADMIN — VALPROIC ACID 1250 MG: 500 SOLUTION ORAL at 21:13

## 2024-10-20 RX ADMIN — PRAZOSIN HYDROCHLORIDE 2 MG: 1 CAPSULE ORAL at 21:10

## 2024-10-20 RX ADMIN — CLOZAPINE 800 MG: 200 TABLET ORAL at 21:10

## 2024-10-20 RX ADMIN — CYANOCOBALAMIN TAB 1000 MCG 1000 MCG: 1000 TAB at 08:54

## 2024-10-20 RX ADMIN — FLUTICASONE FUROATE 1 PUFF: 100 POWDER RESPIRATORY (INHALATION) at 08:54

## 2024-10-20 RX ADMIN — FAMOTIDINE 20 MG: 20 TABLET ORAL at 08:53

## 2024-10-20 RX ADMIN — LORAZEPAM 0.5 MG: 0.5 TABLET ORAL at 17:25

## 2024-10-20 RX ADMIN — SENNOSIDES AND DOCUSATE SODIUM 2 TABLET: 8.6; 5 TABLET ORAL at 21:10

## 2024-10-20 RX ADMIN — TRAZODONE HYDROCHLORIDE 50 MG: 50 TABLET ORAL at 21:11

## 2024-10-20 RX ADMIN — MELATONIN TAB 3 MG 3 MG: 3 TAB at 21:11

## 2024-10-20 RX ADMIN — CARVEDILOL 6.25 MG: 6.25 TABLET, FILM COATED ORAL at 08:53

## 2024-10-20 RX ADMIN — FAMOTIDINE 20 MG: 20 TABLET ORAL at 17:25

## 2024-10-20 RX ADMIN — POLYETHYLENE GLYCOL 3350 17 G: 17 POWDER, FOR SOLUTION ORAL at 08:55

## 2024-10-20 RX ADMIN — Medication 15 ML: at 08:55

## 2024-10-20 RX ADMIN — LORAZEPAM 0.5 MG: 0.5 TABLET ORAL at 08:53

## 2024-10-20 NOTE — NURSING NOTE
Patient awake and alert, back to bed after breakfast. C/O pain in right foot and requested PRN Tylenol. However, upon return to bedside, patient refused Tylenol. Denies anxiety, depression, SI/HI/AH/VH. Denies any other unmet needs at this time.

## 2024-10-20 NOTE — NURSING NOTE
Patient was visible in the milieu but kept to self while listening to the radio. Denies all psych s/s. No behaviors noted. No c/o pain. Took her HS medications. Safety checks ongoing.

## 2024-10-20 NOTE — PROGRESS NOTES
Progress Note - Behavioral Health     Meli ESPINOZA Nowak 59 y.o. female MRN: [id number]   Unit/Bed#: [facility] 651-02 Encounter: [id number]  Assessment & Plan  Schizoaffective disorder, bipolar type (HCC)  Continue Depakote to 1250 mg nightly  Most recent VPA level 54 on 10/12/24  CMP on 10/12/24 reviewed, WNL  Increase Clozaril to 800 mg nightly for schizoaffective disorder  Clozaril monitoring:  CRP, CBC/diff, CK QMon for monitoring  VSS, without evidence of tachycardia  Most recent ECG reviewed 9/9/24 - normal ECG, NSR  Clozaril level 10/7 was 711, last ANC on 10/14 was 3.80; new Clozaril level 650 on 10/18/24, increasing Clozaril to 800mg PO at HS   Senna to prevent Clozaril induced constipation  Continue Risperdal to 2 mg qhs for ongoing psychosis   Two antipsychotics utilized secondary to multiple failures of monotherapy. Plan to decrease Risperdal, wean off & discontinue once Clozaril is maximized given ongoing psychosis  Continue Ativan 0.5 mg twice daily for anxiety   Continue melatonin 3 mg nightly for insomnia  Continue prazosin 2 mg nightly for PTSD associated nightmares   Continue trazodone 50 mg nightly for insomnia  Discharge disposition: consider CRR program        Recommended Treatment:     Planned medication and treatment changes:    All current active medications have been reviewed  Encourage group therapy, milieu therapy and occupational therapy  Behavioral Health checks for safety monitoring      Current medications:  Current Facility-Administered Medications   Medication Dose Route Frequency Provider Last Rate    acetaminophen  650 mg Oral Q4H PRN JOSE ELIAS Figueroa      acetaminophen  650 mg Oral Q4H PRN JOSE ELIAS Figueroa      acetaminophen  975 mg Oral Q6H PRN JOSE ELIAS Figueroa      aluminum-magnesium hydroxide-simethicone  30 mL Oral Q4H PRN JOSE ELIAS Puri      Artificial Tears  1 drop Both Eyes Q6H PRN Dereje Banuelos MD      atorvastatin  10 mg Oral Daily JOSE ELIAS Figueroa       bisacodyl  10 mg Oral Daily PRN Kristen Logan, CRNP      bisacodyl  10 mg Rectal Daily PRN Marie Ziegler, CRNP      carvedilol  6.25 mg Oral BID With Meals Marie Ziegler, CRNP      cloZAPine  800 mg Oral HS Tena Zafarn, CRNP      cyanocobalamin  1,000 mcg Oral Daily Marie Ziegler, CRNP      famotidine  20 mg Oral BID Shan Fitzgerald DO      fluticasone  1 puff Inhalation Daily Marie Ziegler, CRVALE      hydrOXYzine HCL  25 mg Oral Q6H PRN Max 4/day Marie Ziegler, CRNP      hydrOXYzine HCL  50 mg Oral Q6H PRN Max 4/day Marie Ziegler, JOSE ELIAS      ipratropium-albuterol  3 mL Nebulization Q4H PRN Darin Lawton MD      LORazepam  0.5 mg Oral BID Marie Ziegler, JOSE ELIAS      Or    LORazepam  0.5 mg Intramuscular BID Marie Ziegler, CRNP      LORazepam  1 mg Intramuscular Q6H PRN Max 3/day Marie Ziegler, CRNP      LORazepam  1 mg Oral Q6H PRN Max 3/day Marie Ziegler, CHRISTOPHERNP      melatonin  3 mg Oral HS Marie Ziegler, JOSE ELIAS      mirtazapine  7.5 mg Oral HS PRN Marie Ziegler, JOSE ELIAS      Multivitamin  15 mL Oral Daily Marie Ziegler, CHRISTOPHERNP      nicotine  1 patch Transdermal Daily Marie Ziegler, JOSE ELIAS      OLANZapine  2.5 mg Oral Q6H PRN Dereje Banuelos MD      Or    OLANZapine  2.5 mg Intramuscular Q6H PRN Dereje Banuelos MD      OLANZapine  5 mg Oral Q6H PRN Dereje Banuelos MD      Or    OLANZapine  5 mg Intramuscular Q6H PRN Dereje Banuelos MD      ondansetron  4 mg Oral Q6H PRN Darin Lawton MD      polyethylene glycol  17 g Oral Daily Kristen Logan, CRNP      polyethylene glycol  17 g Oral Daily PRN Marie Ziegler, JOSE ELIAS      prazosin  2 mg Oral HS Marie Ziegler, CRNP      risperiDONE  2 mg Oral HS Marie Ziegler, CHRISTOPHERNP      senna-docusate sodium  2 tablet Oral HS Rehana Borrego PA-C      traZODone  50 mg Oral HS Marie Ziegler, CRNP      valproic acid  1,250 mg Oral HS JOSE ELIAS Figueroa         Risks / Benefits of Treatment:    Risks, benefits, and possible  "side effects of medications explained to patient and patient verbalizes understanding and agreement for treatment.    Subjective:    Behavior over the last 24 hours: unchanged.     Meli was seen today in follow-up for continuation of care. Per staff, has been limited with self-care and was incontinent of urine overnight.  She has been spending longer periods of time in bed.  Meli is seen resting in bed, refusing to cooperate with assessment today.  She states, \"I am sleeping, thank you.\"  Discussed ongoing titration with Clozaril.  Asked writer to, \"shut the door.\"  Meli adamantly denies suicidal/homicidal ideation in addition to thoughts of self-injury. Meli presently is contacting for safety on the unit and feels comfortable to confide in staff if thoughts arise. Meli has been complaint with medications and tolerating without any side effects.     Sleep: normal  Appetite: normal  Medication side effects: No   ROS: no complaints    Mental Status Evaluation:    Appearance:  disheveled, psychotic appearing, dressed in hospital attire, laying upside down in bed   Behavior:  guarded, uncooperative, refusing to participate   Speech:  Scant   Mood:  euthymic, \"sleeping\"   Affect:  flat   Thought Process:  disorganized, illogical   Associations: tangential associations   Thought Content:  grandiose, paranoid, somatic, and fixed delusions, Alevism preoccupation   Perceptual Disturbances: appears responding to internal stimuli   Risk Potential: Suicidal ideation - None at present  Homicidal ideation - None at present  Potential for aggression - No   Sensorium:  oriented to person, place, and time/date   Memory:  recent and remote memory: unable to assess due to lack of cooperation   Consciousness:  alert and awake   Attention/Concentration: attention span and concentration appear shorter than expected for age   Insight:  poor   Judgment: poor   Gait/Station: in bed   Motor Activity: no abnormal movements     Vital signs " in last 24 hours:    Temp:  [96.6 °F (35.9 °C)-98.5 °F (36.9 °C)] 97.1 °F (36.2 °C)  HR:  [69-83] 69  BP: (114-135)/(63-75) 114/65  Resp:  [15-16] 16  SpO2:  [93 %-96 %] 93 %  O2 Device: None (Room air)    Laboratory results: I have personally reviewed all pertinent laboratory/tests results    Results from the past 24 hours: No results found for this or any previous visit (from the past 24 hour(s)).  Most Recent Labs:   Lab Results   Component Value Date    WBC 8.25 10/14/2024    RBC 4.19 10/14/2024    HGB 13.1 10/14/2024    HCT 39.2 10/14/2024     10/14/2024    RDW 14.4 10/14/2024    NEUTROABS 3.80 10/14/2024    SODIUM 138 10/12/2024    K 4.1 10/12/2024     10/12/2024    CO2 29 10/12/2024    BUN 18 10/12/2024    CREATININE 0.97 10/12/2024    GLUC 146 (H) 10/12/2024    CALCIUM 9.2 10/12/2024    AST 12 (L) 10/12/2024    ALT 12 10/12/2024    ALKPHOS 84 10/12/2024    TP 6.7 10/12/2024    ALB 3.9 10/12/2024    TBILI 0.26 10/12/2024    VALPROICTOT 54 10/12/2024    AMMONIA 32 07/03/2024    [id number] 2.000 07/24/2024    HGBA1C 6.4 (H) 05/03/2024     05/03/2024       Suicide/Homicide Risk Assessment:    Risk of Harm to Self:   Nursing Suicide Risk Assessment Last 24 hours: C-SSRS Risk (Since Last Contact)  Calculated C-SSRS Risk Score (Since Last Contact): No Risk Indicated    Risk of Harm to Others:  Nursing Homicide Risk Assessment: Violence Risk to Others: Denies within past 6 months    The following interventions are recommended: Behavioral Health checks for safety monitoring, continued hospitalization on locked unit    Progress Toward Goals: regressed    Counseling / Coordination of Care:    Total floor / unit time spent today 43 minutes. Greater than 50% of total time was spent with the patient and / or family counseling and / or coordination of care. A description of counseling / coordination of care:    Rehana Borrego PA-C 10/20/24

## 2024-10-20 NOTE — PLAN OF CARE
Problem: Alteration in Thoughts and Perception  Goal: Verbalize thoughts and feelings  Description: Interventions:  - Promote a nonjudgmental and trusting relationship with the patient through active listening and therapeutic communication  - Assess patient's level of functioning, behavior and potential for risk  - Engage patient in 1 on 1 interactions  - Encourage patient to express fears, feelings, frustrations, and discuss symptoms    - [place] patient to reality, help patient recognize reality-based thinking   - Administer medications as ordered and assess for potential side effects  - Provide the patient education related to the signs and symptoms of the illness and desired effects of prescribed medications  Outcome: Progressing     Problem: Ineffective Coping  Goal: Participates in unit activities  Description: Interventions:  - Provide therapeutic environment   - Provide required programming   - Redirect inappropriate behaviors   Outcome: Progressing     Problem: Risk for Self Injury/Neglect  Goal: Treatment Goal: Remain safe during length of stay, learn and adopt new coping skills, and be free of self-injurious ideation, impulses and acts at the time of discharge  Outcome: Progressing  Goal: Verbalize thoughts and feelings  Description: Interventions:  - Assess and re-assess patient's lethality and potential for self-injury  - Engage patient in 1:1 interactions, daily, for a minimum of 15 minutes  - Encourage patient to express feelings, fears, frustrations, hopes  - Establish rapport/trust with patient   Outcome: Progressing  Goal: Refrain from harming self  Description: Interventions:  - Monitor patient closely, per order  - Develop a trusting relationship  - Supervise medication ingestion, monitor effects and side effects   Outcome: Progressing  Goal: Attend and participate in unit activities, including therapeutic, recreational, and educational groups  Description: Interventions:  - Provide therapeutic  and educational activities daily, encourage attendance and participation, and document same in the medical record  - Obtain collateral information, encourage visitation and family involvement in care   Outcome: Progressing  Goal: Recognize maladaptive responses and adopt new coping mechanisms  Outcome: Progressing

## 2024-10-21 LAB
BASOPHILS # BLD AUTO: 0.1 THOUSANDS/ΜL (ref 0–0.1)
BASOPHILS NFR BLD AUTO: 1 % (ref 0–1)
CK SERPL-CCNC: 25 U/L (ref 26–192)
CRP SERPL QL: 2 MG/L
EOSINOPHIL # BLD AUTO: 0 THOUSAND/ΜL (ref 0–0.61)
EOSINOPHIL NFR BLD AUTO: 0 % (ref 0–6)
ERYTHROCYTE [DISTWIDTH] IN BLOOD BY AUTOMATED COUNT: 14 % (ref 11.6–15.1)
HCT VFR BLD AUTO: 39.9 % (ref 34.8–46.1)
HGB BLD-MCNC: 13.3 G/DL (ref 11.5–15.4)
IMM GRANULOCYTES # BLD AUTO: 0.02 THOUSAND/UL (ref 0–0.2)
IMM GRANULOCYTES NFR BLD AUTO: 0 % (ref 0–2)
LYMPHOCYTES # BLD AUTO: 3.95 THOUSANDS/ΜL (ref 0.6–4.47)
LYMPHOCYTES NFR BLD AUTO: 43 % (ref 14–44)
MCH RBC QN AUTO: 31.6 PG (ref 26.8–34.3)
MCHC RBC AUTO-ENTMCNC: 33.3 G/DL (ref 31.4–37.4)
MCV RBC AUTO: 95 FL (ref 82–98)
MONOCYTES # BLD AUTO: 1.06 THOUSAND/ΜL (ref 0.17–1.22)
MONOCYTES NFR BLD AUTO: 12 % (ref 4–12)
NEUTROPHILS # BLD AUTO: 4.08 THOUSANDS/ΜL (ref 1.85–7.62)
NEUTS SEG NFR BLD AUTO: 44 % (ref 43–75)
NRBC BLD AUTO-RTO: 0 /100 WBCS
PLATELET # BLD AUTO: 239 THOUSANDS/UL (ref 149–390)
PMV BLD AUTO: 10 FL (ref 8.9–12.7)
RBC # BLD AUTO: 4.21 MILLION/UL (ref 3.81–5.12)
WBC # BLD AUTO: 9.21 THOUSAND/UL (ref 4.31–10.16)

## 2024-10-21 PROCEDURE — 99232 SBSQ HOSP IP/OBS MODERATE 35: CPT | Performed by: STUDENT IN AN ORGANIZED HEALTH CARE EDUCATION/TRAINING PROGRAM

## 2024-10-21 PROCEDURE — 85025 COMPLETE CBC W/AUTO DIFF WBC: CPT

## 2024-10-21 PROCEDURE — 86140 C-REACTIVE PROTEIN: CPT

## 2024-10-21 PROCEDURE — 82550 ASSAY OF CK (CPK): CPT

## 2024-10-21 RX ADMIN — CARVEDILOL 6.25 MG: 6.25 TABLET, FILM COATED ORAL at 17:04

## 2024-10-21 RX ADMIN — MELATONIN TAB 3 MG 3 MG: 3 TAB at 21:35

## 2024-10-21 RX ADMIN — LORAZEPAM 0.5 MG: 0.5 TABLET ORAL at 08:49

## 2024-10-21 RX ADMIN — RISPERIDONE 2 MG: 2 TABLET, FILM COATED ORAL at 21:35

## 2024-10-21 RX ADMIN — SENNOSIDES AND DOCUSATE SODIUM 2 TABLET: 8.6; 5 TABLET ORAL at 21:35

## 2024-10-21 RX ADMIN — FLUTICASONE FUROATE 1 PUFF: 100 POWDER RESPIRATORY (INHALATION) at 08:49

## 2024-10-21 RX ADMIN — VALPROIC ACID 1250 MG: 500 SOLUTION ORAL at 21:35

## 2024-10-21 RX ADMIN — CLOZAPINE 800 MG: 200 TABLET ORAL at 21:35

## 2024-10-21 RX ADMIN — FAMOTIDINE 20 MG: 20 TABLET ORAL at 08:49

## 2024-10-21 RX ADMIN — CYANOCOBALAMIN TAB 1000 MCG 1000 MCG: 1000 TAB at 08:49

## 2024-10-21 RX ADMIN — CARVEDILOL 6.25 MG: 6.25 TABLET, FILM COATED ORAL at 08:49

## 2024-10-21 RX ADMIN — TRAZODONE HYDROCHLORIDE 50 MG: 50 TABLET ORAL at 21:35

## 2024-10-21 RX ADMIN — FAMOTIDINE 20 MG: 20 TABLET ORAL at 17:03

## 2024-10-21 RX ADMIN — PRAZOSIN HYDROCHLORIDE 2 MG: 1 CAPSULE ORAL at 21:35

## 2024-10-21 RX ADMIN — Medication 15 ML: at 08:50

## 2024-10-21 RX ADMIN — ATORVASTATIN CALCIUM 10 MG: 10 TABLET, FILM COATED ORAL at 08:49

## 2024-10-21 RX ADMIN — POLYETHYLENE GLYCOL 3350 17 G: 17 POWDER, FOR SOLUTION ORAL at 08:51

## 2024-10-21 RX ADMIN — LORAZEPAM 0.5 MG: 0.5 TABLET ORAL at 17:03

## 2024-10-21 NOTE — TREATMENT TEAM
10/21/24 0900 10/21/24 1000 10/21/24 1100   Activity/Group Checklist   Group Exercise Community meeting Admission/Discharge   Attendance Did not attend Attended Other (Comment)  (lacks capacity)   Attendance Duration (min)  --  31-45  --    Interactions  --  Interacted appropriately  --    Affect/Mood  --  Appropriate  --    Goals Achieved  --  Identified feelings;Identified triggers;Identified relapse prevention strategies;Able to listen to others;Able to engage in interactions;Able to reflect/comment on own behavior;Able to manage/cope with feelings;Verbalized increased hopefulness;Able to self-disclose  --       10/21/24 1330   Activity/Group Checklist   Group Other (Comment)  (Community support and resources)   Attendance Attended;Other (Comment)  (late)   Attendance Duration (min) 46-60   Interactions Did not interact   Affect/Mood Calm   Goals Achieved Identified feelings;Identified relapse prevention strategies;Identified triggers;Able to listen to others;Able to engage in interactions;Able to reflect/comment on own behavior;Able to manage/cope with feelings;Verbalized increased hopefulness;Able to self-disclose;Able to recieve feedback;Increased hopefulness;Discussed self-esteem issues;Identified resources and support systems

## 2024-10-21 NOTE — NURSING NOTE
Patient visible in the unit. She is cooperative and compliant with medications. She denies anxiety, depression, SI and AVH. Will continue to monitor for safety and  support.

## 2024-10-21 NOTE — ASSESSMENT & PLAN NOTE
Continue Depakote to 1250 mg nightly  Most recent VPA level 54 on 10/12/24  CMP on 10/12/24 reviewed, WNL  Increase Clozaril to 800 mg nightly for schizoaffective disorder  Clozaril monitoring:  CRP, CBC/diff, CK QMon for monitoring  VSS, without evidence of tachycardia  Most recent ECG reviewed 9/9/24 - normal ECG, NSR  Clozaril level 10/7 was 711, last ANC on 10/14 was 3.80; new Clozaril level 650 on 10/18/24, increasing Clozaril to 850 mg PO at HS starting tomorrow  Obtain Clozaril level 10/28/2024  Senna to prevent Clozaril induced constipation  Continue Risperdal to 2 mg qhs for ongoing psychosis   Two antipsychotics utilized secondary to multiple failures of monotherapy. Plan to decrease Risperdal, wean off & discontinue once Clozaril is maximized given ongoing psychosis  Continue Ativan 0.5 mg twice daily for anxiety   Continue melatonin 3 mg nightly for insomnia  Continue prazosin 2 mg nightly for PTSD associated nightmares   Continue trazodone 50 mg nightly for insomnia  Discharge disposition: consider CRR program

## 2024-10-21 NOTE — NURSING NOTE
Patient is alert and visible in the milieu. Pleasant and able to make needs known. Denies all psych s/s. Medication compliant and cooperative with care. Safety precautions maintained.

## 2024-10-21 NOTE — PLAN OF CARE
Problem: DISCHARGE PLANNING - CARE MANAGEMENT  Goal: Discharge to post-acute care or home with appropriate resources  Description: INTERVENTIONS:  - Conduct assessment to determine patient/family and health care team treatment goals, and need for post-acute services based on payer coverage, community resources, and patient preferences, and barriers to discharge  - Address psychosocial, clinical, and financial barriers to discharge as identified in assessment in conjunction with the patient/family and health care team  - Arrange appropriate level of post-acute services according to patient’s   needs and preference and payer coverage in collaboration with the physician and health care team  - Communicate with and update the patient/family, physician, and health care team regarding progress on the discharge plan  - Arrange appropriate transportation to post-acute venues  Outcome: Progressing     Problem: Prexisting or High Potential for Compromised Skin Integrity  Goal: Skin integrity is maintained or improved  Description: INTERVENTIONS:  - Identify patients at risk for skin breakdown  - Assess and monitor skin integrity  - Assess and monitor nutrition and hydration status  - Monitor labs   - Assess for incontinence   - Turn and reposition patient  - Assist with mobility/ambulation  - Relieve pressure over bony prominences  - Avoid friction and shearing  - Provide appropriate hygiene as needed including keeping skin clean and dry  - Evaluate need for skin moisturizer/barrier cream  - Collaborate with interdisciplinary team   - Patient/family teaching  - Consider wound care consult   Outcome: Progressing     Problem: Alteration in Thoughts and Perception  Goal: Treatment Goal: Gain control of psychotic behaviors/thinking, reduce/eliminate presenting symptoms and demonstrate improved reality functioning upon discharge  Outcome: Progressing  Goal: Verbalize thoughts and feelings  Description: Interventions:  - Promote  a nonjudgmental and trusting relationship with the patient through active listening and therapeutic communication  - Assess patient's level of functioning, behavior and potential for risk  - Engage patient in 1 on 1 interactions  - Encourage patient to express fears, feelings, frustrations, and discuss symptoms    - [place] patient to reality, help patient recognize reality-based thinking   - Administer medications as ordered and assess for potential side effects  - Provide the patient education related to the signs and symptoms of the illness and desired effects of prescribed medications  Outcome: Progressing  Goal: Refrain from acting on delusional thinking/internal stimuli  Description: Interventions:  - Monitor patient closely, per order   - Utilize least restrictive measures   - Set reasonable limits, give positive feedback for acceptable   - Administer medications as ordered and monitor of potential side effects  Outcome: Progressing  Goal: Agree to be compliant with medication regime, as prescribed and report medication side effects  Description: Interventions:  - Offer appropriate PRN medication and supervise ingestion; conduct AIMS, as needed   Outcome: Progressing  Goal: Attend and participate in unit activities, including therapeutic, recreational, and educational groups  Description: Interventions:  -Encourage Visitation and family involvement in care  Outcome: Progressing  Goal: Recognize dysfunctional thoughts, communicate reality-based thoughts at the time of discharge  Description: Interventions:  - Provide medication and psycho-education to assist patient in compliance and developing insight into his/her illness   Outcome: Progressing  Goal: Complete daily ADLs, including personal hygiene independently, as able  Description: Interventions:  - Observe, teach, and assist patient with ADLS  - Monitor and promote a balance of rest/activity, with adequate nutrition and elimination   Outcome: Progressing      Problem: Ineffective Coping  Goal: Identifies ineffective coping skills  Outcome: Progressing  Goal: Demonstrates healthy coping skills  Outcome: Progressing  Goal: Participates in unit activities  Description: Interventions:  - Provide therapeutic environment   - Provide required programming   - Redirect inappropriate behaviors   Outcome: Progressing  Goal: Patient/Family participate in treatment and DC plans  Description: Interventions:  - Provide therapeutic environment  Outcome: Progressing  Goal: Patient/Family verbalizes awareness of resources  Outcome: Progressing  Goal: Understands least restrictive measures  Description: Interventions:  - Utilize least restrictive behavior  Outcome: Progressing  Goal: Free from restraint events  Description: - Utilize least restrictive measures   - Provide behavioral interventions   - Redirect inappropriate behaviors   Outcome: Progressing     Problem: Risk for Self Injury/Neglect  Goal: Treatment Goal: Remain safe during length of stay, learn and adopt new coping skills, and be free of self-injurious ideation, impulses and acts at the time of discharge  Outcome: Progressing  Goal: Verbalize thoughts and feelings  Description: Interventions:  - Assess and re-assess patient's lethality and potential for self-injury  - Engage patient in 1:1 interactions, daily, for a minimum of 15 minutes  - Encourage patient to express feelings, fears, frustrations, hopes  - Establish rapport/trust with patient   Outcome: Progressing  Goal: Refrain from harming self  Description: Interventions:  - Monitor patient closely, per order  - Develop a trusting relationship  - Supervise medication ingestion, monitor effects and side effects   Outcome: Progressing  Goal: Attend and participate in unit activities, including therapeutic, recreational, and educational groups  Description: Interventions:  - Provide therapeutic and educational activities daily, encourage attendance and participation,  and document same in the medical record  - Obtain collateral information, encourage visitation and family involvement in care   Outcome: Progressing  Goal: Recognize maladaptive responses and adopt new coping mechanisms  Outcome: Progressing     Problem: Depression  Goal: Treatment Goal: Demonstrate behavioral control of depressive symptoms, verbalize feelings of improved mood/affect, and adopt new coping skills prior to discharge  Outcome: Progressing  Goal: Verbalize thoughts and feelings  Description: Interventions:  - Assess and re-assess patient's level of risk   - Engage patient in 1:1 interactions, daily, for a minimum of 15 minutes   - Encourage patient to express feelings, fears, frustrations, hopes   Outcome: Progressing  Goal: Refrain from isolation  Description: Interventions:  - Develop a trusting relationship   - Encourage socialization   Outcome: Progressing  Goal: Refrain from self-neglect  Outcome: Progressing  Goal: Attend and participate in unit activities, including therapeutic, recreational, and educational groups  Description: Interventions:  - Provide therapeutic and educational activities daily, encourage attendance and participation, and document same in the medical record   Outcome: Progressing     Problem: Anxiety  Goal: Anxiety is at manageable level  Description: Interventions:  - Assess and monitor patient's anxiety level.   - Monitor for signs and symptoms (heart palpitations, chest pain, shortness of breath, headaches, nausea, feeling jumpy, restlessness, irritable, apprehensive).   - Collaborate with interdisciplinary team and initiate plan and interventions as ordered.  - [place] patient to unit/surroundings  - Explain treatment plan  - Encourage participation in care  - Encourage verbalization of concerns/fears  - Identify coping mechanisms  - Assist in developing anxiety-reducing skills  - Administer/offer alternative therapies  - Limit or eliminate stimulants  Outcome:  Progressing     Problem: SAFETY,RESTRAINT: NV/NON-SELF DESTRUCTIVE BEHAVIOR  Goal: Remains free of harm/injury (restraint for non violent/non self-detsructive behavior)  Description: INTERVENTIONS:  - Instruct patient/family regarding restraint use   - Assess and monitor physiologic and psychological status   - Provide interventions and comfort measures to meet assessed patient needs   - Identify and implement measures to help patient regain control  - Assess readiness for release of restraint   Outcome: Progressing  Goal: Returns to optimal restraint-free functioning  Description: INTERVENTIONS:  - Assess the patient's behavior and symptoms that indicate continued need for restraint  - Identify and implement measures to help patient regain control  - Assess readiness for release of restraint   Outcome: Progressing     Problem: Potential for Falls  Goal: Patient will remain free of falls  Description: INTERVENTIONS:  - Educate patient on patient safety including physical limitations  - Instruct patient to call for assistance with activity   - Consult OT/PT to assist with strengthening/mobility   - Keep Call bell within reach  - Keep bed low and locked with side rails adjusted as appropriate  - Keep care items and personal belongings within reach  - Initiate and maintain comfort rounds  - Offer Toileting every 2 Hours, in advance of need  - Initiate/Maintain bed and chair alarm  - Obtain necessary fall risk management equipment: walker, wheelchair  - Apply yellow socks and bracelet for high fall risk patients  - Patient moved to room near nurses station  Outcome: Progressing     Problem: Nutrition/Hydration-ADULT  Goal: Nutrient/Hydration intake appropriate for improving, restoring or maintaining nutritional needs  Description: Monitor and assess patient's nutrition/hydration status for malnutrition. Collaborate with interdisciplinary team and initiate plan and interventions as ordered.  Monitor patient's weight and  dietary intake as ordered or per policy. Utilize nutrition screening tool and intervene as necessary. Determine patient's food preferences and provide high-protein, high-caloric foods as appropriate.     INTERVENTIONS:  - Monitor oral intake, urinary output, labs, and treatment plans  - Assess nutrition and hydration status and recommend course of action  - Evaluate amount of meals eaten  - Assist patient with eating if necessary   - Allow adequate time for meals  - Recommend/ encourage appropriate diets, oral nutritional supplements, and vitamin/mineral supplements  - Order, calculate, and assess calorie counts as needed  - Recommend, monitor, and adjust tube feedings and TPN/PPN based on assessed needs  - Assess need for intravenous fluids  - Provide specific nutrition/hydration education as appropriate  - Include patient/family/caregiver in decisions related to nutrition  Outcome: Progressing

## 2024-10-21 NOTE — PROGRESS NOTES
Progress Note - Behavioral Health   Name: Meli Nowak 59 y.o. female I MRN: [id number]  Unit/Bed#: [facility] 651-02 I Date of Admission: 7/23/2024   Date of Service: 10/21/2024 I Hospital Day: 90     Assessment & Plan  Schizoaffective disorder, bipolar type (HCC)  Continue Depakote to 1250 mg nightly  Most recent VPA level 54 on 10/12/24  CMP on 10/12/24 reviewed, WNL  Increase Clozaril to 800 mg nightly for schizoaffective disorder  Clozaril monitoring:  CRP, CBC/diff, CK QMon for monitoring  VSS, without evidence of tachycardia  Most recent ECG reviewed 9/9/24 - normal ECG, NSR  Clozaril level 10/7 was 711, last ANC on 10/14 was 3.80; new Clozaril level 650 on 10/18/24, increasing Clozaril to 850 mg PO at HS starting tomorrow  Obtain Clozaril level 10/28/2024  Senna to prevent Clozaril induced constipation  Continue Risperdal to 2 mg qhs for ongoing psychosis   Two antipsychotics utilized secondary to multiple failures of monotherapy. Plan to decrease Risperdal, wean off & discontinue once Clozaril is maximized given ongoing psychosis  Continue Ativan 0.5 mg twice daily for anxiety   Continue melatonin 3 mg nightly for insomnia  Continue prazosin 2 mg nightly for PTSD associated nightmares   Continue trazodone 50 mg nightly for insomnia  Discharge disposition: consider CRR program          Plan   Progress Toward Goals:   Meli is progressing towards goals of inpatient psychiatric treatment by continued medication compliance and is attending therapeutic modalities on the milieu. However, the patient continues to require inpatient psychiatric hospitalization for continued medication management and titration to optimize symptom reduction, improve sleep hygiene, and demonstrate adequate self-care.    Recommended Treatment: Treatment plan and medication changes discussed and per the attending physician the plan is:    1.Continue with group therapy, milieu therapy and occupational therapy  2.Behavioral Health checks every  7 minutes  3.Continue frequent safety checks and vitals per unit protocol  4.Continue with SLIM medical management as indicated  5.Continue with current medication regimen  6.Will review labs in the a.m.  7.Disposition Planning: Discharge planning and efforts remain ongoing    Behavioral Health Medications: all current active meds have been reviewed and continue current psychiatric medications.  Current Facility-Administered Medications   Medication Dose Route Frequency Provider Last Rate    acetaminophen  650 mg Oral Q4H PRN Marie Ziegler, CRNP      acetaminophen  650 mg Oral Q4H PRN Marie Ziegler, CRNP      acetaminophen  975 mg Oral Q6H PRN Marie Ziegler, CRNP      aluminum-magnesium hydroxide-simethicone  30 mL Oral Q4H PRN Parris Bolanos, CRNP      Artificial Tears  1 drop Both Eyes Q6H PRN Dereje Banuelos MD      atorvastatin  10 mg Oral Daily Marie Ziegler, CRNP      bisacodyl  10 mg Oral Daily PRN Kristen Logan, CRNP      bisacodyl  10 mg Rectal Daily PRN Marie Ziegler, JOSE ELIAS      carvedilol  6.25 mg Oral BID With Meals Marie Ziegler, CHRISTOPHERNP      cloZAPine  800 mg Oral HS Tena Long, CRNP      cyanocobalamin  1,000 mcg Oral Daily Marie Ziegler, CRNP      famotidine  20 mg Oral BID Shan Fitzgerald, DO      fluticasone  1 puff Inhalation Daily Marie Ziegler, JOSE ELIAS      hydrOXYzine HCL  25 mg Oral Q6H PRN Max 4/day Marie Ziegler, CRNP      hydrOXYzine HCL  50 mg Oral Q6H PRN Max 4/day Marie Ziegler, JOSE ELIAS      ipratropium-albuterol  3 mL Nebulization Q4H PRN Darin Lawton MD      LORazepam  0.5 mg Oral BID Marie Ziegler, JOSE ELIAS      Or    LORazepam  0.5 mg Intramuscular BID Marie Ziegler, CRNP      LORazepam  1 mg Intramuscular Q6H PRN Max 3/day Marie Ziegler, JOSE ELIAS      LORazepam  1 mg Oral Q6H PRN Max 3/day Marie Ziegler, JOSE ELIAS      melatonin  3 mg Oral HS Marie Ziegler, CRNP      mirtazapine  7.5 mg Oral HS PRN Marie Ziegler, CRNP      Multivitamin  15 mL  "Oral Daily JOSE ELIAS Figueroa      nicotine  1 patch Transdermal Daily JOSE ELIAS Figueroa      OLANZapine  2.5 mg Oral Q6H PRN Dereje Banuleos MD      Or    OLANZapine  2.5 mg Intramuscular Q6H PRN Dereje Banuelos MD      OLANZapine  5 mg Oral Q6H PRN Dereje Banuelos MD      Or    OLANZapine  5 mg Intramuscular Q6H PRN Dereje Banuelos MD      ondansetron  4 mg Oral Q6H PRN Darin Lawton MD      polyethylene glycol  17 g Oral Daily Kristen Logan, JOSE ELIAS      polyethylene glycol  17 g Oral Daily PRN JOSE ELIAS Figueroa      prazosin  2 mg Oral HS Marie Ziegler, JOSE ELIAS      risperiDONE  2 mg Oral HS JOSE ELIAS Figueroa      senna-docusate sodium  2 tablet Oral HS Rehana Borrego PA-C      traZODone  50 mg Oral HS JOSE ELIAS Figueroa      valproic acid  1,250 mg Oral HS JOSE ELIAS Figueroa         Risks / Benefits of Treatment:  Risks, benefits, and possible side effects of medications explained to patient and patient verbalizes understanding and agreement for treatment.       Subjective    Patient was seen today for continuation of care, records reviewed and patient was discussed with the morning case review team.    Meli was seen today for psychiatric follow-up.  On assessment today, Meli was sent but still bizarre on approach auditory hallucinations currently denied, patient.  States to this writer \"I cannot wait, I am going to get my lipstick and make up \".  No overt delusions currently verbalized, patient must remain on mouth checks due to subtherapeutic levels despite increase in Clozaril, ongoing paranoia, Latter day preoccupation and mood stabilization.  Clozaril level to be obtained on 10/28/2024.  Patient is currently on 2 antipsychotics secondary to multiple failures of monotherapy, will proceed with off titration of Risperdal to 1 mg qhs to moderate improvement. Tentative discharge pending CRR placement.     [place] denies acute suicidal/self-harm ideation/intent/plan upon direct " inquiry at this time. Impulse control is intact.  Meli remains adherent to her current psychotropic medication regimen and denies any side effects from medications, as well as none noted on exam.    Psychiatric Review of Systems:  Behavior over the last 24 hours:  unchanged.   Sleep: good  Appetite: good  Medication side effects: none  ROS: no complaints, denies shortness of breath or chest pain and all other systems are negative for acute changes        Objective    Vitals:  Vitals:    10/21/24 0723   BP: 148/65   Pulse: 82   Resp: 17   Temp: 97.6 °F (36.4 °C)   SpO2: 95%       Laboratory Results:  I have personally reviewed all pertinent laboratory/tests results.  Most Recent Labs:   Lab Results   Component Value Date    WBC 9.21 10/21/2024    RBC 4.21 10/21/2024    HGB 13.3 10/21/2024    HCT 39.9 10/21/2024     10/21/2024    RDW 14.0 10/21/2024    NEUTROABS 4.08 10/21/2024    SODIUM 138 10/12/2024    K 4.1 10/12/2024     10/12/2024    CO2 29 10/12/2024    BUN 18 10/12/2024    CREATININE 0.97 10/12/2024    GLUC 146 (H) 10/12/2024    GLUF 98 08/10/2024    CALCIUM 9.2 10/12/2024    AST 12 (L) 10/12/2024    ALT 12 10/12/2024    ALKPHOS 84 10/12/2024    TP 6.7 10/12/2024    ALB 3.9 10/12/2024    TBILI 0.26 10/12/2024    VALPROICTOT 54 10/12/2024    AMMONIA 32 07/03/2024    QGD1YDVTTXII 2.000 07/24/2024    HGBA1C 6.4 (H) 05/03/2024     05/03/2024       Mental Status Evaluation:  Appearance:  disheveled, marginal hygiene   Behavior:  bizarre, guarded   Speech:  normal rate and volume   Mood:  less irritable, less manic   Affect:  constricted   Thought Process:  illogical, perseverative, concrete   Thought Content:  bizarre delusions, paranoid ideation   Perceptual Disturbances: talks to self at times   Risk Potential: Suicidal ideation - None  Homicidal ideation - None  Potential for aggression - No   Memory:  recent and remote memory grossly intact   Sensorium  person, place, and time/date       Consciousness:  alert and awake   Attention: attention span and concentration are age appropriate   Insight:  limited   Judgment: limited   Gait/Station: normal gait/station   Motor Activity: no abnormal movements       Counseling / Coordination of Care:  Patient's progress reviewed with nursing staff.  Medications, treatment progress and treatment plan reviewed with patient.  Supportive counseling provided to the patient.    This note was completed in part utilizing Dragon dictation Software. Grammatical, translation, syntax errors, random word insertions, spelling mistakes, and incomplete sentences may be an occasional consequence of this system secondary to software limitations with voice recognition, ambient noise, and hardware issues. If you have any questions or concerns about the content, text, or information contained within the body of this dictation, please contact the provider for clarification

## 2024-10-21 NOTE — PROGRESS NOTES
10/21/24 0822   Team Meeting   Meeting Type Daily Rounds   Initial Conference Date 10/21/24   Next Conference Date 10/22/24   Team Members Present   Team Members Present Physician;Nurse;;   Physician Team Member Dr Jensen, JAIR Goldman   Nursing Team Member Clarissa   Care Management Team Member Anum   Social Work Team Member Ayse   Patient/Family Present   Patient Present No   Patient's Family Present No     Deny s/s, c/o right foot pain, refused meds for it, waiting on CRR, Clozaril level 650 and increased, may decrease risperdal

## 2024-10-21 NOTE — NURSING NOTE
Patient calm, cooperative, and medication compliant. Denies all s/s including  SI/HI. Patient withdrawn to room, out for meals only, and attended groups with encouragement. Monitor for safety and support.

## 2024-10-22 PROCEDURE — 99232 SBSQ HOSP IP/OBS MODERATE 35: CPT | Performed by: STUDENT IN AN ORGANIZED HEALTH CARE EDUCATION/TRAINING PROGRAM

## 2024-10-22 RX ORDER — RISPERIDONE 1 MG/1
1 TABLET ORAL
Status: DISCONTINUED | OUTPATIENT
Start: 2024-10-22 | End: 2024-10-28

## 2024-10-22 RX ORDER — RISPERIDONE 1 MG/1
1 TABLET ORAL
Status: CANCELLED | OUTPATIENT
Start: 2024-10-22

## 2024-10-22 RX ADMIN — TRAZODONE HYDROCHLORIDE 50 MG: 50 TABLET ORAL at 21:36

## 2024-10-22 RX ADMIN — CARVEDILOL 6.25 MG: 6.25 TABLET, FILM COATED ORAL at 17:31

## 2024-10-22 RX ADMIN — FAMOTIDINE 20 MG: 20 TABLET ORAL at 17:31

## 2024-10-22 RX ADMIN — LORAZEPAM 0.5 MG: 0.5 TABLET ORAL at 08:51

## 2024-10-22 RX ADMIN — POLYETHYLENE GLYCOL 3350 17 G: 17 POWDER, FOR SOLUTION ORAL at 08:52

## 2024-10-22 RX ADMIN — Medication 15 ML: at 08:51

## 2024-10-22 RX ADMIN — VALPROIC ACID 1250 MG: 500 SOLUTION ORAL at 21:37

## 2024-10-22 RX ADMIN — SENNOSIDES AND DOCUSATE SODIUM 2 TABLET: 8.6; 5 TABLET ORAL at 21:36

## 2024-10-22 RX ADMIN — CARVEDILOL 6.25 MG: 6.25 TABLET, FILM COATED ORAL at 08:55

## 2024-10-22 RX ADMIN — LORAZEPAM 0.5 MG: 0.5 TABLET ORAL at 17:31

## 2024-10-22 RX ADMIN — FLUTICASONE FUROATE 1 PUFF: 100 POWDER RESPIRATORY (INHALATION) at 08:51

## 2024-10-22 RX ADMIN — RISPERIDONE 1 MG: 1 TABLET, FILM COATED ORAL at 21:36

## 2024-10-22 RX ADMIN — MELATONIN TAB 3 MG 3 MG: 3 TAB at 21:39

## 2024-10-22 RX ADMIN — CYANOCOBALAMIN TAB 1000 MCG 1000 MCG: 1000 TAB at 08:51

## 2024-10-22 RX ADMIN — PRAZOSIN HYDROCHLORIDE 2 MG: 1 CAPSULE ORAL at 21:37

## 2024-10-22 RX ADMIN — ATORVASTATIN CALCIUM 10 MG: 10 TABLET, FILM COATED ORAL at 08:51

## 2024-10-22 RX ADMIN — CLOZAPINE 850 MG: 200 TABLET ORAL at 21:36

## 2024-10-22 RX ADMIN — FAMOTIDINE 20 MG: 20 TABLET ORAL at 08:51

## 2024-10-22 NOTE — PROGRESS NOTES
10/22/24 0752   Team Meeting   Meeting Type Daily Rounds   Initial Conference Date 10/22/24   Next Conference Date 10/23/24   Team Members Present   Team Members Present Physician;Nurse;;   Physician Team Member Dr Banuelos, JAIR Goldman   Nursing Team Member Dona   Care Management Team Member Anum   Social Work Team Member Ayse   Patient/Family Present   Patient Present No   Patient's Family Present No     Discharge pending CRR, no radio if she is not attending groups, incontinent of urine in bed, irritable with staff when cleaning up, clozaril level 10/20

## 2024-10-22 NOTE — ASSESSMENT & PLAN NOTE
Continue Depakote to 1250 mg nightly  Most recent VPA level 54 on 10/12/24  CMP on 10/12/24 reviewed, WNL  Increase Clozaril to 850 mg nightly for schizoaffective disorder  Clozaril monitoring:  CRP, CBC/diff, CK QMon for monitoring  VSS, without evidence of tachycardia  Most recent ECG reviewed 9/9/24 - normal ECG, NSR  Clozaril level 10/7 was 711, last ANC on 10/14 was 3.80; new Clozaril level 650 on 10/18/24, increasing Clozaril to 850 mg PO at HS starting today  Obtain Clozaril level 10/28/2024  Senna to prevent Clozaril induced constipation  Continue Risperdal to 2 mg qhs for ongoing psychosis   Will decrease to 1 mg qhs due to minimal effects  Two antipsychotics utilized secondary to multiple failures of monotherapy. Plan to decrease Risperdal, wean off & discontinue once Clozaril is maximized given ongoing psychosis  Continue Ativan 0.5 mg twice daily for anxiety   Continue melatonin 3 mg nightly for insomnia  Continue prazosin 2 mg nightly for PTSD associated nightmares   Continue trazodone 50 mg nightly for insomnia  Discharge disposition: consider CRR program

## 2024-10-22 NOTE — PLAN OF CARE
Problem: DISCHARGE PLANNING - CARE MANAGEMENT  Goal: Discharge to post-acute care or home with appropriate resources  Description: INTERVENTIONS:  - Conduct assessment to determine patient/family and health care team treatment goals, and need for post-acute services based on payer coverage, community resources, and patient preferences, and barriers to discharge  - Address psychosocial, clinical, and financial barriers to discharge as identified in assessment in conjunction with the patient/family and health care team  - Arrange appropriate level of post-acute services according to patient’s   needs and preference and payer coverage in collaboration with the physician and health care team  - Communicate with and update the patient/family, physician, and health care team regarding progress on the discharge plan  - Arrange appropriate transportation to post-acute venues  Outcome: Progressing     Problem: Prexisting or High Potential for Compromised Skin Integrity  Goal: Skin integrity is maintained or improved  Description: INTERVENTIONS:  - Identify patients at risk for skin breakdown  - Assess and monitor skin integrity  - Assess and monitor nutrition and hydration status  - Monitor labs   - Assess for incontinence   - Turn and reposition patient  - Assist with mobility/ambulation  - Relieve pressure over bony prominences  - Avoid friction and shearing  - Provide appropriate hygiene as needed including keeping skin clean and dry  - Evaluate need for skin moisturizer/barrier cream  - Collaborate with interdisciplinary team   - Patient/family teaching  - Consider wound care consult   Outcome: Progressing     Problem: Alteration in Thoughts and Perception  Goal: Treatment Goal: Gain control of psychotic behaviors/thinking, reduce/eliminate presenting symptoms and demonstrate improved reality functioning upon discharge  Outcome: Progressing  Goal: Verbalize thoughts and feelings  Description: Interventions:  - Promote  a nonjudgmental and trusting relationship with the patient through active listening and therapeutic communication  - Assess patient's level of functioning, behavior and potential for risk  - Engage patient in 1 on 1 interactions  - Encourage patient to express fears, feelings, frustrations, and discuss symptoms    - [place] patient to reality, help patient recognize reality-based thinking   - Administer medications as ordered and assess for potential side effects  - Provide the patient education related to the signs and symptoms of the illness and desired effects of prescribed medications  Outcome: Progressing  Goal: Refrain from acting on delusional thinking/internal stimuli  Description: Interventions:  - Monitor patient closely, per order   - Utilize least restrictive measures   - Set reasonable limits, give positive feedback for acceptable   - Administer medications as ordered and monitor of potential side effects  Outcome: Progressing  Goal: Agree to be compliant with medication regime, as prescribed and report medication side effects  Description: Interventions:  - Offer appropriate PRN medication and supervise ingestion; conduct AIMS, as needed   Outcome: Progressing  Goal: Attend and participate in unit activities, including therapeutic, recreational, and educational groups  Description: Interventions:  -Encourage Visitation and family involvement in care  Outcome: Progressing  Goal: Recognize dysfunctional thoughts, communicate reality-based thoughts at the time of discharge  Description: Interventions:  - Provide medication and psycho-education to assist patient in compliance and developing insight into his/her illness   Outcome: Progressing  Goal: Complete daily ADLs, including personal hygiene independently, as able  Description: Interventions:  - Observe, teach, and assist patient with ADLS  - Monitor and promote a balance of rest/activity, with adequate nutrition and elimination   Outcome: Progressing      Problem: Ineffective Coping  Goal: Identifies ineffective coping skills  Outcome: Progressing  Goal: Demonstrates healthy coping skills  Outcome: Progressing  Goal: Participates in unit activities  Description: Interventions:  - Provide therapeutic environment   - Provide required programming   - Redirect inappropriate behaviors   Outcome: Progressing  Goal: Patient/Family participate in treatment and DC plans  Description: Interventions:  - Provide therapeutic environment  Outcome: Progressing  Goal: Patient/Family verbalizes awareness of resources  Outcome: Progressing  Goal: Understands least restrictive measures  Description: Interventions:  - Utilize least restrictive behavior  Outcome: Progressing  Goal: Free from restraint events  Description: - Utilize least restrictive measures   - Provide behavioral interventions   - Redirect inappropriate behaviors   Outcome: Progressing     Problem: Risk for Self Injury/Neglect  Goal: Treatment Goal: Remain safe during length of stay, learn and adopt new coping skills, and be free of self-injurious ideation, impulses and acts at the time of discharge  Outcome: Progressing  Goal: Verbalize thoughts and feelings  Description: Interventions:  - Assess and re-assess patient's lethality and potential for self-injury  - Engage patient in 1:1 interactions, daily, for a minimum of 15 minutes  - Encourage patient to express feelings, fears, frustrations, hopes  - Establish rapport/trust with patient   Outcome: Progressing  Goal: Refrain from harming self  Description: Interventions:  - Monitor patient closely, per order  - Develop a trusting relationship  - Supervise medication ingestion, monitor effects and side effects   Outcome: Progressing  Goal: Attend and participate in unit activities, including therapeutic, recreational, and educational groups  Description: Interventions:  - Provide therapeutic and educational activities daily, encourage attendance and participation,  and document same in the medical record  - Obtain collateral information, encourage visitation and family involvement in care   Outcome: Progressing  Goal: Recognize maladaptive responses and adopt new coping mechanisms  Outcome: Progressing     Problem: Depression  Goal: Treatment Goal: Demonstrate behavioral control of depressive symptoms, verbalize feelings of improved mood/affect, and adopt new coping skills prior to discharge  Outcome: Progressing  Goal: Verbalize thoughts and feelings  Description: Interventions:  - Assess and re-assess patient's level of risk   - Engage patient in 1:1 interactions, daily, for a minimum of 15 minutes   - Encourage patient to express feelings, fears, frustrations, hopes   Outcome: Progressing  Goal: Refrain from isolation  Description: Interventions:  - Develop a trusting relationship   - Encourage socialization   Outcome: Progressing  Goal: Refrain from self-neglect  Outcome: Progressing  Goal: Attend and participate in unit activities, including therapeutic, recreational, and educational groups  Description: Interventions:  - Provide therapeutic and educational activities daily, encourage attendance and participation, and document same in the medical record   Outcome: Progressing     Problem: Anxiety  Goal: Anxiety is at manageable level  Description: Interventions:  - Assess and monitor patient's anxiety level.   - Monitor for signs and symptoms (heart palpitations, chest pain, shortness of breath, headaches, nausea, feeling jumpy, restlessness, irritable, apprehensive).   - Collaborate with interdisciplinary team and initiate plan and interventions as ordered.  - [place] patient to unit/surroundings  - Explain treatment plan  - Encourage participation in care  - Encourage verbalization of concerns/fears  - Identify coping mechanisms  - Assist in developing anxiety-reducing skills  - Administer/offer alternative therapies  - Limit or eliminate stimulants  Outcome:  Progressing     Problem: SAFETY,RESTRAINT: NV/NON-SELF DESTRUCTIVE BEHAVIOR  Goal: Remains free of harm/injury (restraint for non violent/non self-detsructive behavior)  Description: INTERVENTIONS:  - Instruct patient/family regarding restraint use   - Assess and monitor physiologic and psychological status   - Provide interventions and comfort measures to meet assessed patient needs   - Identify and implement measures to help patient regain control  - Assess readiness for release of restraint   Outcome: Progressing  Goal: Returns to optimal restraint-free functioning  Description: INTERVENTIONS:  - Assess the patient's behavior and symptoms that indicate continued need for restraint  - Identify and implement measures to help patient regain control  - Assess readiness for release of restraint   Outcome: Progressing     Problem: Potential for Falls  Goal: Patient will remain free of falls  Description: INTERVENTIONS:  - Educate patient on patient safety including physical limitations  - Instruct patient to call for assistance with activity   - Consult OT/PT to assist with strengthening/mobility   - Keep Call bell within reach  - Keep bed low and locked with side rails adjusted as appropriate  - Keep care items and personal belongings within reach  - Initiate and maintain comfort rounds  - Offer Toileting every 2 Hours, in advance of need  - Initiate/Maintain bed and chair alarm  - Obtain necessary fall risk management equipment: walker, wheelchair  - Apply yellow socks and bracelet for high fall risk patients  - Patient moved to room near nurses station  Outcome: Progressing     Problem: Nutrition/Hydration-ADULT  Goal: Nutrient/Hydration intake appropriate for improving, restoring or maintaining nutritional needs  Description: Monitor and assess patient's nutrition/hydration status for malnutrition. Collaborate with interdisciplinary team and initiate plan and interventions as ordered.  Monitor patient's weight and  dietary intake as ordered or per policy. Utilize nutrition screening tool and intervene as necessary. Determine patient's food preferences and provide high-protein, high-caloric foods as appropriate.     INTERVENTIONS:  - Monitor oral intake, urinary output, labs, and treatment plans  - Assess nutrition and hydration status and recommend course of action  - Evaluate amount of meals eaten  - Assist patient with eating if necessary   - Allow adequate time for meals  - Recommend/ encourage appropriate diets, oral nutritional supplements, and vitamin/mineral supplements  - Order, calculate, and assess calorie counts as needed  - Recommend, monitor, and adjust tube feedings and TPN/PPN based on assessed needs  - Assess need for intravenous fluids  - Provide specific nutrition/hydration education as appropriate  - Include patient/family/caregiver in decisions related to nutrition  Outcome: Progressing

## 2024-10-22 NOTE — NURSING NOTE
Received Pt at 1100. Pt calm and cooperative. Pleasant on approach. +meals and meds. Good appetite. Compliant w/ mouth checks. Attends partial groups; will attend, but only stays for brief periods.  On assessment Denies SI/HI. Denies psychosis. Endorses mild to moderate anxiety. Is unable to identify source. Requests PRN ativan. Encouraged to utilize coping skills. Pt receptive and spends time coloring and listening to music quietly in room. Staff support and reassurance provided. Q15 rounding maintained. Will cont to provide support and encouragement as needed.

## 2024-10-22 NOTE — TREATMENT TEAM
10/22/24 0900 10/22/24 1000 10/22/24 1300   Activity/Group Checklist   Group Exercise Other (Comment)  (Group Art Therapy/Psychodynamic, Open Choice followed by Process Discussion) Other (Comment)  (Journaling)   Attendance Attended Attended Attended   Attendance Duration (min) 0-15 31-45  (90 min group) 46-60   Interactions Interacted appropriately Interacted appropriately Other (Comment)  (focused on radio and music)   Affect/Mood Appropriate;Calm Appropriate;Blunted/flat Incongruent   Goals Achieved Able to listen to others;Able to engage in interactions Able to listen to others;Able to engage in interactions Discussed coping strategies;Able to listen to others;Identified feelings      10/22/24 1400   Activity/Group Checklist   Group Other (Comment)  (Mindfulness)   Attendance Other (Comment)  (left early)   Attendance Duration (min)  --    Interactions  --    Affect/Mood  --    Goals Achieved  --

## 2024-10-22 NOTE — PROGRESS NOTES
Progress Note - Behavioral Health   Name: Meli Nowak 59 y.o. female I MRN: [id number]  Unit/Bed#: [facility] 651-02 I Date of Admission: 7/23/2024   Date of Service: 10/22/2024 I Hospital Day: 91     Assessment & Plan  Schizoaffective disorder, bipolar type (HCC)  Continue Depakote to 1250 mg nightly  Most recent VPA level 54 on 10/12/24  CMP on 10/12/24 reviewed, WNL  Increase Clozaril to 850 mg nightly for schizoaffective disorder  Clozaril monitoring:  CRP, CBC/diff, CK QMon for monitoring  VSS, without evidence of tachycardia  Most recent ECG reviewed 9/9/24 - normal ECG, NSR  Clozaril level 10/7 was 711, last ANC on 10/14 was 3.80; new Clozaril level 650 on 10/18/24, increasing Clozaril to 850 mg PO at HS starting today  Obtain Clozaril level 10/28/2024  Senna to prevent Clozaril induced constipation  Continue Risperdal to 2 mg qhs for ongoing psychosis   Will decrease to 1 mg qhs due to minimal effects  Two antipsychotics utilized secondary to multiple failures of monotherapy. Plan to decrease Risperdal, wean off & discontinue once Clozaril is maximized given ongoing psychosis  Continue Ativan 0.5 mg twice daily for anxiety   Continue melatonin 3 mg nightly for insomnia  Continue prazosin 2 mg nightly for PTSD associated nightmares   Continue trazodone 50 mg nightly for insomnia  Discharge disposition: consider CRR program          Plan   Progress Toward Goals:   Meli is progressing towards goals of inpatient psychiatric treatment by continued medication compliance and is attending therapeutic modalities on the milieu. However, the patient continues to require inpatient psychiatric hospitalization for continued medication management and titration to optimize symptom reduction, improve sleep hygiene, and demonstrate adequate self-care.    Recommended Treatment: Treatment plan and medication changes discussed and per the attending physician the plan is:    1.Continue with group therapy, milieu therapy and  occupational therapy  2.Behavioral Health checks every 7 minutes  3.Continue frequent safety checks and vitals per unit protocol  4.Continue with SLIM medical management as indicated  5.Continue with current medication regimen  6.Will review labs in the a.m.  7.Disposition Planning: Discharge planning and efforts remain ongoing    Behavioral Health Medications: all current active meds have been reviewed.  Current Facility-Administered Medications   Medication Dose Route Frequency Provider Last Rate    acetaminophen  650 mg Oral Q4H PRN Marie Ziegler, CRNP      acetaminophen  650 mg Oral Q4H PRN Marie Ziegler, CRNP      acetaminophen  975 mg Oral Q6H PRN Marie Ziegler, CRNP      aluminum-magnesium hydroxide-simethicone  30 mL Oral Q4H PRN Parris Bolanos, CRNP      Artificial Tears  1 drop Both Eyes Q6H PRN Dereje Banuelos MD      atorvastatin  10 mg Oral Daily Marie Ziegler, CRNP      bisacodyl  10 mg Oral Daily PRN Kristen Logan, CRNP      bisacodyl  10 mg Rectal Daily PRN Marie Ziegler, JOSE ELIAS      carvedilol  6.25 mg Oral BID With Meals Marie Ziegler, CHRISTOPHERNP      cloZAPine  800 mg Oral HS Tena Long, CRNP      cyanocobalamin  1,000 mcg Oral Daily Marie Ziegler, CRNP      famotidine  20 mg Oral BID Shan Fitzgerald, DO      fluticasone  1 puff Inhalation Daily Marie Ziegler, JOSE ELIAS      hydrOXYzine HCL  25 mg Oral Q6H PRN Max 4/day Marie Ziegler, CRNP      hydrOXYzine HCL  50 mg Oral Q6H PRN Max 4/day Marie Ziegler, JOSE ELIAS      ipratropium-albuterol  3 mL Nebulization Q4H PRN Darin Lawton MD      LORazepam  0.5 mg Oral BID Marie Ziegler, JOSE ELIAS      Or    LORazepam  0.5 mg Intramuscular BID Marie Ziegler, CRNP      LORazepam  1 mg Intramuscular Q6H PRN Max 3/day Marie Ziegler, JOSE ELIAS      LORazepam  1 mg Oral Q6H PRN Max 3/day Marie Ziegler, CHRISTOPHERNP      melatonin  3 mg Oral HS Marie Ziegler, CRNP      mirtazapine  7.5 mg Oral HS PRN Marie Ziegler, CRNP      Multivitamin   "15 mL Oral Daily Marie Ziegler, JOSE ELIAS      nicotine  1 patch Transdermal Daily JOSE ELIAS Figueroa      OLANZapine  2.5 mg Oral Q6H PRN Dereje Banuelos MD      Or    OLANZapine  2.5 mg Intramuscular Q6H PRN Dereje Banuelos MD      OLANZapine  5 mg Oral Q6H PRN Dereje Banuelos MD      Or    OLANZapine  5 mg Intramuscular Q6H PRN Dereje Banuelos MD      ondansetron  4 mg Oral Q6H PRN Darin Lawton MD      polyethylene glycol  17 g Oral Daily Kristenabdoulaye Tomasjimmy Logan, JOSE ELIAS      polyethylene glycol  17 g Oral Daily PRN Marie Ziegler, JOSE ELIAS      prazosin  2 mg Oral HS Marie Ziegler, JOSE ELIAS      risperiDONE  2 mg Oral HS JOSE ELIAS Figueroa      senna-docusate sodium  2 tablet Oral HS Rehana Borrego PA-C      traZODone  50 mg Oral HS JOSE ELIAS Figueroa      valproic acid  1,250 mg Oral HS JOSE EILAS Figueroa         Risks / Benefits of Treatment:  Risks, benefits, and possible side effects of medications explained to patient and patient verbalizes understanding and agreement for treatment.       Subjective    Patient was seen today for continuation of care, records reviewed and patient was discussed with the morning case review team.    Meli was seen today for psychiatric follow-up.  On assessment today, Meli was calm, pleasant and cooperative. Meli was sitting in the hallway reading a comic book. Stating that she enjoys them. Meli brighter upon approach this morning and asked writer, \" Are you going to get me my make up?\". Denies depression or anxiety today, states \" I was fraire yesterday, but I'm ok today\". Patient appears bizarre, but denies any AH/VH. Endorses feeling tired this morning, due to staff coming in many times during the night to care for her roommate. Meli endorses good appetite. Visible on the unit and currently heading to art group. Meli verbalizes wanting to exercise to care for her body, and lose weight. No overt delusions verbalizes at this time, patient must remain on mouth checks due " to subtherapeutic lever despite increases on Clozaril, ongoing paranoia, Denominational preoccupation and mood stabilization. Clozaril level to be obtained 10/28/24. Patient is currently in 2 antipsychotics secondary to multiple failure on monotherapy will titrate risperidone to 1 mg QHS today to moderate improvement and increase Clozaril to 850 mg QHS. Tentative discharge pending CRR placement.     Meli denies acute suicidal/self-harm ideation/intent/plan upon direct inquiry at this time.  [place] remains behaviorally appropriate, no agitation or aggression noted on exam or in report.  Meli does not appear overtly manic. Impulse control is intact.  Meli remains adherent to her current psychotropic medication regimen and denies any side effects from medications, as well as none noted on exam.    Psychiatric Review of Systems:  Behavior over the last 24 hours:  unchanged.   Sleep: interrupted  Appetite: good  Medication side effects: none  ROS: no complaints, denies shortness of breath or chest pain and all other systems are negative for acute changes        Objective    Vitals:  Vitals:    10/21/24 2038   BP: 121/66   Pulse: 86   Resp: 18   Temp: 97.7 °F (36.5 °C)   SpO2: 92%       Laboratory Results:  I have personally reviewed all pertinent laboratory/tests results.    Mental Status Evaluation:  Appearance:  disheveled, marginal hygiene   Behavior:  bizarre, guarded   Speech:  normal rate and volume   Mood:  less irritable, less manic   Affect:  constricted   Thought Process:  illogical, perseverative, concrete   Thought Content:  bizarre delusions, paranoid ideation   Perceptual Disturbances: talks to self at times   Risk Potential: Suicidal ideation - None  Homicidal ideation - None  Potential for aggression - No   Memory:  recent and remote memory grossly intact   Sensorium  person, place, and time/date      Consciousness:  alert and awake   Attention: attention span and concentration are age appropriate   Insight:   limited   Judgment: limited   Gait/Station: normal gait/station   Motor Activity: no abnormal movements       Counseling / Coordination of Care:  Patient's progress reviewed with nursing staff.  Medications, treatment progress and treatment plan reviewed with patient.  Supportive counseling provided to the patient.    This note was completed in part utilizing Dragon dictation Software. Grammatical, translation, syntax errors, random word insertions, spelling mistakes, and incomplete sentences may be an occasional consequence of this system secondary to software limitations with voice recognition, ambient noise, and hardware issues. If you have any questions or concerns about the content, text, or information contained within the body of this dictation, please contact the provider for clarification

## 2024-10-23 PROCEDURE — 99232 SBSQ HOSP IP/OBS MODERATE 35: CPT | Performed by: STUDENT IN AN ORGANIZED HEALTH CARE EDUCATION/TRAINING PROGRAM

## 2024-10-23 RX ADMIN — Medication 15 ML: at 09:50

## 2024-10-23 RX ADMIN — LORAZEPAM 0.5 MG: 0.5 TABLET ORAL at 17:18

## 2024-10-23 RX ADMIN — ATORVASTATIN CALCIUM 10 MG: 10 TABLET, FILM COATED ORAL at 09:50

## 2024-10-23 RX ADMIN — SENNOSIDES AND DOCUSATE SODIUM 2 TABLET: 8.6; 5 TABLET ORAL at 21:10

## 2024-10-23 RX ADMIN — PRAZOSIN HYDROCHLORIDE 2 MG: 1 CAPSULE ORAL at 21:10

## 2024-10-23 RX ADMIN — MELATONIN TAB 3 MG 3 MG: 3 TAB at 21:10

## 2024-10-23 RX ADMIN — CLOZAPINE 850 MG: 200 TABLET ORAL at 21:10

## 2024-10-23 RX ADMIN — FAMOTIDINE 20 MG: 20 TABLET ORAL at 09:49

## 2024-10-23 RX ADMIN — FAMOTIDINE 20 MG: 20 TABLET ORAL at 17:18

## 2024-10-23 RX ADMIN — VALPROIC ACID 1250 MG: 500 SOLUTION ORAL at 21:10

## 2024-10-23 RX ADMIN — POLYETHYLENE GLYCOL 3350 17 G: 17 POWDER, FOR SOLUTION ORAL at 09:50

## 2024-10-23 RX ADMIN — LORAZEPAM 0.5 MG: 0.5 TABLET ORAL at 09:50

## 2024-10-23 RX ADMIN — RISPERIDONE 1 MG: 1 TABLET, FILM COATED ORAL at 21:10

## 2024-10-23 RX ADMIN — TRAZODONE HYDROCHLORIDE 50 MG: 50 TABLET ORAL at 21:10

## 2024-10-23 RX ADMIN — FLUTICASONE FUROATE 1 PUFF: 100 POWDER RESPIRATORY (INHALATION) at 09:52

## 2024-10-23 RX ADMIN — CYANOCOBALAMIN TAB 1000 MCG 1000 MCG: 1000 TAB at 09:49

## 2024-10-23 NOTE — NURSING NOTE
Pt remains mostly isolative to room. Out for meals and needs.  Behaviorally controlled. Describes anxiety as improved from previously in shift. Denies any unmet needs at this time. Q15 rounding maintained. Will cont to provide support as needed.

## 2024-10-23 NOTE — ASSESSMENT & PLAN NOTE
Continue Depakote to 1250 mg nightly  Most recent VPA level 54 on 10/12/24  CMP on 10/12/24 reviewed, WNL  Increased Clozaril to 850 mg nightly for schizoaffective disorder  Clozaril monitoring:  CRP, CBC/diff, CK QMon for monitoring  VSS, without evidence of tachycardia  Most recent ECG reviewed 9/9/24 - normal ECG, NSR  Clozaril level 10/7 was 711, last ANC on 10/14 was 3.80; new Clozaril level 650 on 10/18/24, increasing Clozaril to 850 mg PO at HS starting today  Obtain Clozaril level 10/28/2024  Senna to prevent Clozaril induced constipation  Continue Risperdal to 2 mg qhs for ongoing psychosis   Will decrease to 1 mg qhs due to minimal effects  Two antipsychotics utilized secondary to multiple failures of monotherapy. Plan to decrease Risperdal, wean off & discontinue once Clozaril is maximized given ongoing psychosis  Continue Ativan 0.5 mg twice daily for anxiety   Continue melatonin 3 mg nightly for insomnia  Continue prazosin 2 mg nightly for PTSD associated nightmares   Continue trazodone 50 mg nightly for insomnia  Discharge disposition: consider CRR program

## 2024-10-23 NOTE — PROGRESS NOTES
Progress Note - Behavioral Health   Name: Meli Nowak 59 y.o. female I MRN: [id number]  Unit/Bed#: [facility] 651-02 I Date of Admission: 7/23/2024   Date of Service: 10/23/2024 I Hospital Day: 92     Assessment & Plan  Schizoaffective disorder, bipolar type (HCC)  Continue Depakote to 1250 mg nightly  Most recent VPA level 54 on 10/12/24  CMP on 10/12/24 reviewed, WNL  Increased Clozaril to 850 mg nightly for schizoaffective disorder  Clozaril monitoring:  CRP, CBC/diff, CK QMon for monitoring  VSS, without evidence of tachycardia  Most recent ECG reviewed 9/9/24 - normal ECG, NSR  Clozaril level 10/7 was 711, last ANC on 10/14 was 3.80; new Clozaril level 650 on 10/18/24, increasing Clozaril to 850 mg PO at HS starting today  Obtain Clozaril level 10/28/2024  Senna to prevent Clozaril induced constipation  Continue Risperdal to 2 mg qhs for ongoing psychosis   Will decrease to 1 mg qhs due to minimal effects  Two antipsychotics utilized secondary to multiple failures of monotherapy. Plan to decrease Risperdal, wean off & discontinue once Clozaril is maximized given ongoing psychosis  Continue Ativan 0.5 mg twice daily for anxiety   Continue melatonin 3 mg nightly for insomnia  Continue prazosin 2 mg nightly for PTSD associated nightmares   Continue trazodone 50 mg nightly for insomnia  Discharge disposition: consider CRR program          Plan   Progress Toward Goals:   Meli is progressing towards goals of inpatient psychiatric treatment by continued medication compliance and is attending therapeutic modalities on the milieu. However, the patient continues to require inpatient psychiatric hospitalization for continued medication management and titration to optimize symptom reduction, improve sleep hygiene, and demonstrate adequate self-care.    Recommended Treatment: Treatment plan and medication changes discussed and per the attending physician the plan is:    1.Continue with group therapy, milieu therapy and  occupational therapy  2.Behavioral Health checks every 7 minutes  3.Continue frequent safety checks and vitals per unit protocol  4.Continue with [facility] medical management as indicated  5.Continue with current medication regimen  6.Will review labs in the a.m.  7.Disposition Planning: Discharge planning and efforts remain ongoing    Behavioral Health Medications: all current active meds have been reviewed and continue current psychiatric medications.  Current Facility-Administered Medications   Medication Dose Route Frequency Provider Last Rate    acetaminophen  650 mg Oral Q4H PRN Marie Ziegler, CHRISTOPHERNP      acetaminophen  650 mg Oral Q4H PRN Marie Ziegler, CRNP      acetaminophen  975 mg Oral Q6H PRN Marie Ziegler, JOSE ELIAS      aluminum-magnesium hydroxide-simethicone  30 mL Oral Q4H PRN JOSE ELIAS Puri      Artificial Tears  1 drop Both Eyes Q6H PRN Dereje Banuelos MD      atorvastatin  10 mg Oral Daily Marie Ziegler, JOSE ELIAS      bisacodyl  10 mg Oral Daily PRN Kristen Logan, JOSE ELIAS      bisacodyl  10 mg Rectal Daily PRN JOSE ELIAS Figueroa      carvedilol  6.25 mg Oral BID With Meals Marie Ziegler, JOSE ELIAS      cloZAPine  850 mg Oral HS Marie Ziegler, CRNP      cyanocobalamin  1,000 mcg Oral Daily Marie Ziegler, CRNP      famotidine  20 mg Oral BID Shan Fitzgerald, DO      fluticasone  1 puff Inhalation Daily JOSE ELIAS Figueroa      hydrOXYzine HCL  25 mg Oral Q6H PRN Max 4/day Marie Ziegler, JOSE ELIAS      hydrOXYzine HCL  50 mg Oral Q6H PRN Max 4/day JOSE ELIAS Figueroa      ipratropium-albuterol  3 mL Nebulization Q4H PRN Darin Lawton MD      LORazepam  0.5 mg Oral BID JOSE ELIAS Figueroa      Or    LORazepam  0.5 mg Intramuscular BID Marie Ziegler, JOSE ELIAS      LORazepam  1 mg Intramuscular Q6H PRN Max 3/day Marie Ziegler, JOSE ELIAS      LORazepam  1 mg Oral Q6H PRN Max 3/day JOSE ELIAS Figueroa      melatonin  3 mg Oral HS Marie Ziegler, JOSE ELIAS      mirtazapine  7.5 mg Oral HS PRN  Marie Ziegler, JOSE ELIAS      Multivitamin  15 mL Oral Daily JOSE ELIAS Figueroa      nicotine  1 patch Transdermal Daily JOSE ELIAS Figueroa      OLANZapine  2.5 mg Oral Q6H PRN Dereje Banuelos MD      Or    OLANZapine  2.5 mg Intramuscular Q6H PRN Dereje Banuelos MD      OLANZapine  5 mg Oral Q6H PRN Dereje Banuelos MD      Or    OLANZapine  5 mg Intramuscular Q6H PRN Dereje Banuelos MD      ondansetron  4 mg Oral Q6H PRN Darin Lawton MD      polyethylene glycol  17 g Oral Daily Kristenabdoulaye Tomasjimmy Logan, JOSE ELIAS      polyethylene glycol  17 g Oral Daily PRN Marie Ziegler, JOSE ELIAS      prazosin  2 mg Oral HS Marie Ziegler, JOSE ELIAS      risperiDONE  1 mg Oral HS JOSE ELIAS Figueroa      senna-docusate sodium  2 tablet Oral HS Rehana Borrego PA-C      traZODone  50 mg Oral HS JOSE ELIAS Figueroa      valproic acid  1,250 mg Oral HS JOSE ELIAS Figueroa         Risks / Benefits of Treatment:  Risks, benefits, and possible side effects of medications explained to patient and patient verbalizes understanding and agreement for treatment.       Subjective    Patient was seen today for continuation of care, records reviewed and patient was discussed with the morning case review team.    Meli was seen today for psychiatric follow-up.  On assessment today, Meli was still bizarre but pleasant.  Talking about all TV shows including [id number], she is happy and smiling during interview.  Depression and anxiety greatly improved, she still  continues to appear internally preoccupied at times as well as exhibit traits of Mu-ism preoccupation.  Clozaril recently increased to 850 mg nightly, we will obtain level on Monday and proceed with off titration of Risperdal due to minimal effects.  Weekly labs also continue to be obtained and follow-up ongoing with [facility].  Tentative discharge pending CRR placement..     Meli denies acute suicidal/self-harm ideation/intent/plan upon direct inquiry at this time.  Meli remains behaviorally  appropriate, no agitation or aggression noted on exam or in report.  Impulse control is intact.  Meli remains adherent to her current psychotropic medication regimen and denies any side effects from medications, as well as none noted on exam.    Psychiatric Review of Systems:  Behavior over the last 24 hours:  unchanged.   Sleep: good  Appetite: good  Medication side effects: none  ROS: no complaints, denies shortness of breath or chest pain and all other systems are negative for acute changes        Objective    Vitals:  Vitals:    10/23/24 0732   BP: 137/69   Pulse: 76   Resp: 18   Temp: 98 °F (36.7 °C)   SpO2: 92%       Laboratory Results:  I have personally reviewed all pertinent laboratory/tests results.  Most Recent Labs:   Lab Results   Component Value Date    WBC 9.21 10/21/2024    RBC 4.21 10/21/2024    HGB 13.3 10/21/2024    HCT 39.9 10/21/2024     10/21/2024    RDW 14.0 10/21/2024    NEUTROABS 4.08 10/21/2024    SODIUM 138 10/12/2024    K 4.1 10/12/2024     10/12/2024    CO2 29 10/12/2024    BUN 18 10/12/2024    CREATININE 0.97 10/12/2024    GLUC 146 (H) 10/12/2024    GLUF 98 08/10/2024    CALCIUM 9.2 10/12/2024    AST 12 (L) 10/12/2024    ALT 12 10/12/2024    ALKPHOS 84 10/12/2024    TP 6.7 10/12/2024    ALB 3.9 10/12/2024    TBILI 0.26 10/12/2024    VALPROICTOT 54 10/12/2024    AMMONIA 32 07/03/2024    [id number] 2.000 07/24/2024    HGBA1C 6.4 (H) 05/03/2024     05/03/2024       Mental Status Evaluation:  Appearance:  disheveled, marginal hygiene   Behavior:  bizarre, guarded   Speech:  normal rate and volume   Mood:  less anxious, less depressed   Affect:  constricted   Thought Process:  illogical, perseverative, concrete   Thought Content:  bizarre delusions, Denominational and sexual preoccupation   Perceptual Disturbances: denies when asked, but talks to self at times   Risk Potential: Suicidal ideation - None  Homicidal ideation - None  Potential for aggression - No   Memory:   recent and remote memory grossly intact   Sensorium  person, place, and time/date      Consciousness:  alert and awake   Attention: attention span and concentration are age appropriate   Insight:  limited   Judgment: limited   Gait/Station: normal gait/station   Motor Activity: no abnormal movements       Counseling / Coordination of Care:  Patient's progress reviewed with nursing staff.  Medications, treatment progress and treatment plan reviewed with patient.  Supportive counseling provided to the patient.    This note was completed in part utilizing Dragon dictation Software. Grammatical, translation, syntax errors, random word insertions, spelling mistakes, and incomplete sentences may be an occasional consequence of this system secondary to software limitations with voice recognition, ambient noise, and hardware issues. If you have any questions or concerns about the content, text, or information contained within the body of this dictation, please contact the provider for clarification

## 2024-10-23 NOTE — PLAN OF CARE
Problem: Alteration in Thoughts and Perception  Goal: Treatment Goal: Gain control of psychotic behaviors/thinking, reduce/eliminate presenting symptoms and demonstrate improved reality functioning upon discharge  Outcome: Progressing     Problem: Alteration in Thoughts and Perception  Goal: Verbalize thoughts and feelings  Description: Interventions:  - Promote a nonjudgmental and trusting relationship with the patient through active listening and therapeutic communication  - Assess patient's level of functioning, behavior and potential for risk  - Engage patient in 1 on 1 interactions  - Encourage patient to express fears, feelings, frustrations, and discuss symptoms    - [place] patient to reality, help patient recognize reality-based thinking   - Administer medications as ordered and assess for potential side effects  - Provide the patient education related to the signs and symptoms of the illness and desired effects of prescribed medications  Outcome: Progressing     Problem: Alteration in Thoughts and Perception  Goal: Recognize dysfunctional thoughts, communicate reality-based thoughts at the time of discharge  Description: Interventions:  - Provide medication and psycho-education to assist patient in compliance and developing insight into his/her illness   Outcome: Progressing

## 2024-10-23 NOTE — NURSING NOTE
Meli is very pleasant today.  Having good conversations with staff.  She has been laughing and joking and talking with staff about an old TV show.  No overt responding noted this shift.  She denies SI/HI and A/VH.  Medication and meal compliant.  Safety precautions maintained.

## 2024-10-23 NOTE — PROGRESS NOTES
10/23/24 0758   Team Meeting   Meeting Type Daily Rounds   Initial Conference Date 10/23/24   Next Conference Date 10/24/24   Team Members Present   Team Members Present Physician;Nurse;;   Physician Team Member Dr Banuelos, JAIR Goldman   Nursing Team Member Clarissa   Care Management Team Member Anum   Social Work Team Member Ayse   Patient/Family Present   Patient Present No   Patient's Family Present No     Discharge pending CRR, deny s/s, increased clozaril

## 2024-10-24 PROCEDURE — 99232 SBSQ HOSP IP/OBS MODERATE 35: CPT | Performed by: STUDENT IN AN ORGANIZED HEALTH CARE EDUCATION/TRAINING PROGRAM

## 2024-10-24 RX ADMIN — LORAZEPAM 0.5 MG: 0.5 TABLET ORAL at 17:19

## 2024-10-24 RX ADMIN — CARVEDILOL 6.25 MG: 6.25 TABLET, FILM COATED ORAL at 17:19

## 2024-10-24 RX ADMIN — CARVEDILOL 6.25 MG: 6.25 TABLET, FILM COATED ORAL at 07:36

## 2024-10-24 RX ADMIN — NICOTINE 1 PATCH: 7 PATCH, EXTENDED RELEASE TRANSDERMAL at 08:25

## 2024-10-24 RX ADMIN — RISPERIDONE 1 MG: 1 TABLET, FILM COATED ORAL at 21:11

## 2024-10-24 RX ADMIN — FAMOTIDINE 20 MG: 20 TABLET ORAL at 09:40

## 2024-10-24 RX ADMIN — CLOZAPINE 850 MG: 200 TABLET ORAL at 21:10

## 2024-10-24 RX ADMIN — PRAZOSIN HYDROCHLORIDE 2 MG: 1 CAPSULE ORAL at 21:10

## 2024-10-24 RX ADMIN — TRAZODONE HYDROCHLORIDE 50 MG: 50 TABLET ORAL at 21:10

## 2024-10-24 RX ADMIN — ATORVASTATIN CALCIUM 10 MG: 10 TABLET, FILM COATED ORAL at 09:40

## 2024-10-24 RX ADMIN — VALPROIC ACID 1250 MG: 500 SOLUTION ORAL at 21:12

## 2024-10-24 RX ADMIN — MELATONIN TAB 3 MG 3 MG: 3 TAB at 21:10

## 2024-10-24 RX ADMIN — LORAZEPAM 0.5 MG: 0.5 TABLET ORAL at 09:40

## 2024-10-24 RX ADMIN — FLUTICASONE FUROATE 1 PUFF: 100 POWDER RESPIRATORY (INHALATION) at 09:48

## 2024-10-24 RX ADMIN — CYANOCOBALAMIN TAB 1000 MCG 1000 MCG: 1000 TAB at 09:40

## 2024-10-24 RX ADMIN — SENNOSIDES AND DOCUSATE SODIUM 2 TABLET: 8.6; 5 TABLET ORAL at 21:10

## 2024-10-24 RX ADMIN — POLYETHYLENE GLYCOL 3350 17 G: 17 POWDER, FOR SOLUTION ORAL at 09:41

## 2024-10-24 RX ADMIN — Medication 15 ML: at 09:45

## 2024-10-24 RX ADMIN — FAMOTIDINE 20 MG: 20 TABLET ORAL at 17:19

## 2024-10-24 NOTE — NURSING NOTE
Pt pleasant and cooperative throughout shift. Visible at times. Also spends time listening to music quietly in room. Expresses concern about possibility of weight gain due to medications. Requests a mat for exercise. Encouraged to increase ambulation on the unit. Verbal education regarding healthy diet choices provided. Pt seems somewhat receptive.   Pt +meals and meds. Good appetite. Compliant w/ mouth checks. Denies any unmet needs at this time. Q15 rounding maintained.

## 2024-10-24 NOTE — ASSESSMENT & PLAN NOTE
Continue Depakote to 1250 mg nightly  Most recent VPA level 54 on 10/12/24  CMP on 10/12/24 reviewed, WNL  Increased Clozaril to 850 mg nightly for schizoaffective disorder  Clozaril monitoring:  CRP, CBC/diff, CK QMon for monitoring  VSS, without evidence of tachycardia  Most recent ECG reviewed 9/9/24 - normal ECG, NSR  Clozaril level 10/7 was 711, last ANC on 10/14 was 3.80; Clozaril level 650 on 10/18/24, Clozaril increased to 850 mg PO at HS yesterday  Obtain Clozaril level 10/28/2024  Senna to prevent Clozaril induced constipation  Continue Risperdal to 1 mg qhs for ongoing psychosis   Off titrating due to minimal effects  Two antipsychotics utilized secondary to multiple failures of monotherapy. Plan to decrease Risperdal, wean off & discontinue once Clozaril is maximized given ongoing psychosis  Continue Ativan 0.5 mg twice daily for anxiety   Continue melatonin 3 mg nightly for insomnia  Continue prazosin 2 mg nightly for PTSD associated nightmares   Continue trazodone 50 mg nightly for insomnia  Discharge disposition: consider CRR program

## 2024-10-24 NOTE — PROGRESS NOTES
10/24/24 0750   Team Meeting   Meeting Type Daily Rounds   Initial Conference Date 10/24/24   Next Conference Date 10/25/24   Team Members Present   Team Members Present Physician;Nurse;;   Physician Team Member Dr Banuelos, JAIR Goldman   Nursing Team Member Clarissa   Care Management Team Member Anum   Social Work Team Member Ayse   Patient/Family Present   Patient Present No   Patient's Family Present No     Discharge pending CRR, Clozaril increased to 850 & level on Monday, upset with peers

## 2024-10-24 NOTE — PLAN OF CARE
Problem: Prexisting or High Potential for Compromised Skin Integrity  Goal: Skin integrity is maintained or improved  Description: INTERVENTIONS:  - Identify patients at risk for skin breakdown  - Assess and monitor skin integrity  - Assess and monitor nutrition and hydration status  - Monitor labs   - Assess for incontinence   - Turn and reposition patient  - Assist with mobility/ambulation  - Relieve pressure over bony prominences  - Avoid friction and shearing  - Provide appropriate hygiene as needed including keeping skin clean and dry  - Evaluate need for skin moisturizer/barrier cream  - Collaborate with interdisciplinary team   - Patient/family teaching  - Consider wound care consult   Outcome: Progressing     Problem: Alteration in Thoughts and Perception  Goal: Verbalize thoughts and feelings  Description: Interventions:  - Promote a nonjudgmental and trusting relationship with the patient through active listening and therapeutic communication  - Assess patient's level of functioning, behavior and potential for risk  - Engage patient in 1 on 1 interactions  - Encourage patient to express fears, feelings, frustrations, and discuss symptoms    - [place] patient to reality, help patient recognize reality-based thinking   - Administer medications as ordered and assess for potential side effects  - Provide the patient education related to the signs and symptoms of the illness and desired effects of prescribed medications  Outcome: Progressing  Goal: Refrain from acting on delusional thinking/internal stimuli  Description: Interventions:  - Monitor patient closely, per order   - Utilize least restrictive measures   - Set reasonable limits, give positive feedback for acceptable   - Administer medications as ordered and monitor of potential side effects  Outcome: Progressing     Problem: Ineffective Coping  Goal: Free from restraint events  Description: - Utilize least restrictive measures   - Provide behavioral  interventions   - Redirect inappropriate behaviors   Outcome: Progressing     Problem: Risk for Self Injury/Neglect  Goal: Refrain from harming self  Description: Interventions:  - Monitor patient closely, per order  - Develop a trusting relationship  - Supervise medication ingestion, monitor effects and side effects   Outcome: Progressing     Problem: Depression  Goal: Treatment Goal: Demonstrate behavioral control of depressive symptoms, verbalize feelings of improved mood/affect, and adopt new coping skills prior to discharge  Outcome: Progressing     Problem: Anxiety  Goal: Anxiety is at manageable level  Description: Interventions:  - Assess and monitor patient's anxiety level.   - Monitor for signs and symptoms (heart palpitations, chest pain, shortness of breath, headaches, nausea, feeling jumpy, restlessness, irritable, apprehensive).   - Collaborate with interdisciplinary team and initiate plan and interventions as ordered.  - [place] patient to unit/surroundings  - Explain treatment plan  - Encourage participation in care  - Encourage verbalization of concerns/fears  - Identify coping mechanisms  - Assist in developing anxiety-reducing skills  - Administer/offer alternative therapies  - Limit or eliminate stimulants  Outcome: Progressing     Problem: Potential for Falls  Goal: Patient will remain free of falls  Description: INTERVENTIONS:  - Educate patient on patient safety including physical limitations  - Instruct patient to call for assistance with activity   - Consult OT/PT to assist with strengthening/mobility   - Keep Call bell within reach  - Keep bed low and locked with side rails adjusted as appropriate  - Keep care items and personal belongings within reach  - Initiate and maintain comfort rounds  - Offer Toileting every 2 Hours, in advance of need  - Initiate/Maintain bed and chair alarm  - Obtain necessary fall risk management equipment: walker, wheelchair  - Apply yellow socks and bracelet for  high fall risk patients  - Patient moved to room near nurses station  Outcome: Progressing

## 2024-10-25 PROCEDURE — 99232 SBSQ HOSP IP/OBS MODERATE 35: CPT | Performed by: STUDENT IN AN ORGANIZED HEALTH CARE EDUCATION/TRAINING PROGRAM

## 2024-10-25 RX ADMIN — RISPERIDONE 1 MG: 1 TABLET, FILM COATED ORAL at 21:09

## 2024-10-25 RX ADMIN — NICOTINE 1 PATCH: 7 PATCH, EXTENDED RELEASE TRANSDERMAL at 08:17

## 2024-10-25 RX ADMIN — FAMOTIDINE 20 MG: 20 TABLET ORAL at 08:15

## 2024-10-25 RX ADMIN — FAMOTIDINE 20 MG: 20 TABLET ORAL at 17:02

## 2024-10-25 RX ADMIN — VALPROIC ACID 1250 MG: 500 SOLUTION ORAL at 21:09

## 2024-10-25 RX ADMIN — Medication 15 ML: at 08:15

## 2024-10-25 RX ADMIN — TRAZODONE HYDROCHLORIDE 50 MG: 50 TABLET ORAL at 21:09

## 2024-10-25 RX ADMIN — CYANOCOBALAMIN TAB 1000 MCG 1000 MCG: 1000 TAB at 08:15

## 2024-10-25 RX ADMIN — CLOZAPINE 850 MG: 200 TABLET ORAL at 21:09

## 2024-10-25 RX ADMIN — LORAZEPAM 0.5 MG: 0.5 TABLET ORAL at 08:15

## 2024-10-25 RX ADMIN — CARVEDILOL 6.25 MG: 6.25 TABLET, FILM COATED ORAL at 08:15

## 2024-10-25 RX ADMIN — LORAZEPAM 0.5 MG: 0.5 TABLET ORAL at 17:02

## 2024-10-25 RX ADMIN — ATORVASTATIN CALCIUM 10 MG: 10 TABLET, FILM COATED ORAL at 08:15

## 2024-10-25 RX ADMIN — SENNOSIDES AND DOCUSATE SODIUM 2 TABLET: 8.6; 5 TABLET ORAL at 21:09

## 2024-10-25 RX ADMIN — FLUTICASONE FUROATE 1 PUFF: 100 POWDER RESPIRATORY (INHALATION) at 08:17

## 2024-10-25 RX ADMIN — POLYETHYLENE GLYCOL 3350 17 G: 17 POWDER, FOR SOLUTION ORAL at 08:15

## 2024-10-25 RX ADMIN — CARVEDILOL 6.25 MG: 6.25 TABLET, FILM COATED ORAL at 17:02

## 2024-10-25 RX ADMIN — PRAZOSIN HYDROCHLORIDE 2 MG: 1 CAPSULE ORAL at 21:09

## 2024-10-25 RX ADMIN — MELATONIN TAB 3 MG 3 MG: 3 TAB at 21:09

## 2024-10-25 NOTE — PROGRESS NOTES
10/25/24 0810   Team Meeting   Meeting Type Daily Rounds   Initial Conference Date 10/25/24   Next Conference Date 10/28/24   Team Members Present   Team Members Present Physician;Nurse;;   Physician Team Member Dr Banuelos, JAIR Goldman   Nursing Team Member Monica   Care Management Team Member Anum   Social Work Team Member Ayse   Patient/Family Present   Patient Present No   Patient's Family Present No     No groups, discharge pending CRR.

## 2024-10-25 NOTE — ASSESSMENT & PLAN NOTE
Continue Depakote to 1250 mg nightly  Most recent VPA level 54 on 10/12/24  CMP on 10/12/24 reviewed, WNL  Clozaril to 850 mg nightly for schizoaffective disorder  Clozaril monitoring:  CRP, CBC/diff, CK QMon for monitoring  VSS, without evidence of tachycardia  Most recent ECG reviewed 9/9/24 - normal ECG, NSR  Clozaril level 10/7 was 711, last ANC on 10/14 was 3.80; Clozaril level 650 on 10/18/24, Clozaril increased to 850 mg PO at HS   Obtain Clozaril level 10/28/2024  Senna to prevent Clozaril induced constipation  Continue Risperdal to 1 mg qhs for ongoing psychosis   Off titrating due to minimal effects  Two antipsychotics utilized secondary to multiple failures of monotherapy. Plan to decrease Risperdal, wean off & discontinue once Clozaril is maximized given ongoing psychosis  Continue Ativan 0.5 mg twice daily for anxiety   Continue melatonin 3 mg nightly for insomnia  Continue prazosin 2 mg nightly for PTSD associated nightmares   Continue trazodone 50 mg nightly for insomnia  Discharge disposition: CRR

## 2024-10-25 NOTE — NURSING NOTE
Patient withdrawn to her room this evening. She only came out for snack. She was having conversation with her roommate about baking cookies and what ingredients she used. Patient denies all s/s.

## 2024-10-25 NOTE — PLAN OF CARE
Problem: Prexisting or High Potential for Compromised Skin Integrity  Goal: Skin integrity is maintained or improved  Description: INTERVENTIONS:  - Identify patients at risk for skin breakdown  - Assess and monitor skin integrity  - Assess and monitor nutrition and hydration status  - Monitor labs   - Assess for incontinence   - Turn and reposition patient  - Assist with mobility/ambulation  - Relieve pressure over bony prominences  - Avoid friction and shearing  - Provide appropriate hygiene as needed including keeping skin clean and dry  - Evaluate need for skin moisturizer/barrier cream  - Collaborate with interdisciplinary team   - Patient/family teaching  - Consider wound care consult   Outcome: Progressing     Problem: Alteration in Thoughts and Perception  Goal: Verbalize thoughts and feelings  Description: Interventions:  - Promote a nonjudgmental and trusting relationship with the patient through active listening and therapeutic communication  - Assess patient's level of functioning, behavior and potential for risk  - Engage patient in 1 on 1 interactions  - Encourage patient to express fears, feelings, frustrations, and discuss symptoms    - [place] patient to reality, help patient recognize reality-based thinking   - Administer medications as ordered and assess for potential side effects  - Provide the patient education related to the signs and symptoms of the illness and desired effects of prescribed medications  Outcome: Progressing  Goal: Agree to be compliant with medication regime, as prescribed and report medication side effects  Description: Interventions:  - Offer appropriate PRN medication and supervise ingestion; conduct AIMS, as needed   Outcome: Progressing     Problem: Ineffective Coping  Goal: Understands least restrictive measures  Description: Interventions:  - Utilize least restrictive behavior  Outcome: Progressing  Goal: Free from restraint events  Description: - Utilize least  restrictive measures   - Provide behavioral interventions   - Redirect inappropriate behaviors   Outcome: Progressing     Problem: Risk for Self Injury/Neglect  Goal: Verbalize thoughts and feelings  Description: Interventions:  - Assess and re-assess patient's lethality and potential for self-injury  - Engage patient in 1:1 interactions, daily, for a minimum of 15 minutes  - Encourage patient to express feelings, fears, frustrations, hopes  - Establish rapport/trust with patient   Outcome: Progressing  Goal: Refrain from harming self  Description: Interventions:  - Monitor patient closely, per order  - Develop a trusting relationship  - Supervise medication ingestion, monitor effects and side effects   Outcome: Progressing     Problem: Depression  Goal: Verbalize thoughts and feelings  Description: Interventions:  - Assess and re-assess patient's level of risk   - Engage patient in 1:1 interactions, daily, for a minimum of 15 minutes   - Encourage patient to express feelings, fears, frustrations, hopes   Outcome: Progressing

## 2024-10-25 NOTE — NURSING NOTE
"Patient visible on the unit intermittently. She denies all psych s/s stating \"I'm not anxious or depressed yet. I'll tell the nurse at the desk if I am.\" Patient seen responding to internal stimuli and laughing inappropriately when talking with staff. She is medication compliant and cooperative with mouth checks. Encouraged to inform staff of any needs or concerns.    "

## 2024-10-25 NOTE — PROGRESS NOTES
Progress Note - Behavioral Health   Name: Meli Nowak 59 y.o. female I MRN: [id number]  Unit/Bed#: [facility] 651-02 I Date of Admission: 7/23/2024   Date of Service: 10/25/2024 I Hospital Day: 94     Assessment & Plan  Schizoaffective disorder, bipolar type (HCC)  Continue Depakote to 1250 mg nightly  Most recent VPA level 54 on 10/12/24  CMP on 10/12/24 reviewed, WNL  Clozaril to 850 mg nightly for schizoaffective disorder  Clozaril monitoring:  CRP, CBC/diff, CK QMon for monitoring  VSS, without evidence of tachycardia  Most recent ECG reviewed 9/9/24 - normal ECG, NSR  Clozaril level 10/7 was 711, last ANC on 10/14 was 3.80; Clozaril level 650 on 10/18/24, Clozaril increased to 850 mg PO at HS   Obtain Clozaril level 10/28/2024  Senna to prevent Clozaril induced constipation  Continue Risperdal to 1 mg qhs for ongoing psychosis   Off titrating due to minimal effects  Two antipsychotics utilized secondary to multiple failures of monotherapy. Plan to decrease Risperdal, wean off & discontinue once Clozaril is maximized given ongoing psychosis  Continue Ativan 0.5 mg twice daily for anxiety   Continue melatonin 3 mg nightly for insomnia  Continue prazosin 2 mg nightly for PTSD associated nightmares   Continue trazodone 50 mg nightly for insomnia  Discharge disposition: CRR          Plan   Progress Toward Goals:   Meli is progressing towards goals of inpatient psychiatric treatment by continued medication compliance and is attending therapeutic modalities on the milieu. However, the patient continues to require inpatient psychiatric hospitalization for continued medication management and titration to optimize symptom reduction, improve sleep hygiene, and demonstrate adequate self-care.    Recommended Treatment: Treatment plan and medication changes discussed and per the attending physician the plan is:    1.Continue with group therapy, milieu therapy and occupational therapy  2.Behavioral Health checks every 7  minutes  3.Continue frequent safety checks and vitals per unit protocol  4.Continue with SLIM medical management as indicated  5.Continue with current medication regimen  6.Will review labs in the a.m.  7.Disposition Planning: Discharge planning and efforts remain ongoing    Behavioral Health Medications: all current active meds have been reviewed.  Current Facility-Administered Medications   Medication Dose Route Frequency Provider Last Rate    acetaminophen  650 mg Oral Q4H PRN Marie Ziegler, CRNP      acetaminophen  650 mg Oral Q4H PRN Marie Ziegler, CRNP      acetaminophen  975 mg Oral Q6H PRN Marie Ziegler, JOSE ELIAS      aluminum-magnesium hydroxide-simethicone  30 mL Oral Q4H PRN Parris Bolanos, JOSE ELIAS      Artificial Tears  1 drop Both Eyes Q6H PRN Dereje Banuelos MD      atorvastatin  10 mg Oral Daily Marie Ziegler, CHRISTOPHERNP      bisacodyl  10 mg Oral Daily PRN Kristen Logan, CHRISTOPHERNP      bisacodyl  10 mg Rectal Daily PRN Marie Ziegler, JOSE ELIAS      carvedilol  6.25 mg Oral BID With Meals Marie Ziegler, JOSE ELIAS      cloZAPine  850 mg Oral HS Marie Ziegler, CRNP      cyanocobalamin  1,000 mcg Oral Daily Marie Ziegler, CRNP      famotidine  20 mg Oral BID Shan Fitzgerald,       fluticasone  1 puff Inhalation Daily Marie Ziegler, JOSE ELIAS      hydrOXYzine HCL  25 mg Oral Q6H PRN Max 4/day Marie Ziegler, CHRISTOPHERNP      hydrOXYzine HCL  50 mg Oral Q6H PRN Max 4/day Marie Ziegler, JOSE ELIAS      ipratropium-albuterol  3 mL Nebulization Q4H PRN Darin Lawton MD      LORazepam  0.5 mg Oral BID JOSE ELIAS Figueroa      Or    LORazepam  0.5 mg Intramuscular BID Marie Ziegler, JOSE ELIAS      LORazepam  1 mg Intramuscular Q6H PRN Max 3/day Marie Ziegler, JOSE ELIAS      LORazepam  1 mg Oral Q6H PRN Max 3/day JOSE ELIAS Figueroa      melatonin  3 mg Oral HS Marie Ziegler, CHRISTOPHERNP      mirtazapine  7.5 mg Oral HS PRN Marie Ziegler, CHRISTOPHERNP      Multivitamin  15 mL Oral Daily Marie Ziegler, JOSE ELIAS      nicotine  1 patch  Transdermal Daily JOSE ELIAS Figueroa      OLANZapine  2.5 mg Oral Q6H PRN Dereje Banuelos MD      Or    OLANZapine  2.5 mg Intramuscular Q6H PRN Dereje Banuelos MD      OLANZapine  5 mg Oral Q6H PRN Dereje Banuelos MD      Or    OLANZapine  5 mg Intramuscular Q6H PRN Dereje Banuelos MD      ondansetron  4 mg Oral Q6H PRN Drain Lawton MD      polyethylene glycol  17 g Oral Daily Kristen Vani Logan, JOSE ELIAS      polyethylene glycol  17 g Oral Daily PRN Marie Ziegler, JOSE ELIAS      prazosin  2 mg Oral HS Marie Ziegler, CRNP      risperiDONE  1 mg Oral HS Marie Ziegler, JOSE ELIAS      senna-docusate sodium  2 tablet Oral HS Rehana Borrego PA-C      traZODone  50 mg Oral HS Marie Ziegler, JOSE ELIAS      valproic acid  1,250 mg Oral HS JOSE ELIAS Figueroa         Risks / Benefits of Treatment:  Risks, benefits, and possible side effects of medications explained to patient and patient verbalizes understanding and agreement for treatment.       Subjective    Patient was seen today for continuation of care, records reviewed and patient was discussed with the morning case review team.    Meli was seen today for psychiatric follow-up.  On assessment today, Meli was seen lying in bed.  Able to attend group this morning patient is more tangential today, and is preoccupied with seeing her cat.  Redirection mildly effective, patient less religiously preoccupied today but is focused on obtaining make-up including mascara, blush and lipstick.  Clozaril recently increased to 850 mg daily, will obtain level on Monday, 10/28/2024 in addition to weekly labs including CBC, CRP and CK.  Risperdal also to be off titrated starting Monday post lab evaluation.  No adverse or EPS symptoms noted on exam or reported by patient.  Tentative discharge pending CRR placement.    Meli denies acute suicidal/self-harm ideation/intent/plan upon direct inquiry at this time.  Meli remains behaviorally appropriate, no agitation or aggression noted on  exam or in report.  Meli also denies HI/AH/VH, and does not appear overtly manic.  No overt delusions or paranoia are verbalized. Impulse control is intact.  Meli remains adherent to her current psychotropic medication regimen and denies any side effects from medications, as well as none noted on exam.    Psychiatric Review of Systems:  Behavior over the last 24 hours:  unchanged.   Sleep: good  Appetite: good  Medication side effects: none  ROS: no complaints, denies shortness of breath or chest pain and all other systems are negative for acute changes        Objective    Vitals:  Vitals:    10/25/24 0742   BP: 149/68   Pulse: 79   Resp: 18   Temp: (!) 96.8 °F (36 °C)   SpO2: 91%       Laboratory Results:  I have personally reviewed all pertinent laboratory/tests results.  Most Recent Labs:   Lab Results   Component Value Date    WBC 9.21 10/21/2024    RBC 4.21 10/21/2024    HGB 13.3 10/21/2024    HCT 39.9 10/21/2024     10/21/2024    RDW 14.0 10/21/2024    NEUTROABS 4.08 10/21/2024    SODIUM 138 10/12/2024    K 4.1 10/12/2024     10/12/2024    CO2 29 10/12/2024    BUN 18 10/12/2024    CREATININE 0.97 10/12/2024    GLUC 146 (H) 10/12/2024    GLUF 98 08/10/2024    CALCIUM 9.2 10/12/2024    AST 12 (L) 10/12/2024    ALT 12 10/12/2024    ALKPHOS 84 10/12/2024    TP 6.7 10/12/2024    ALB 3.9 10/12/2024    TBILI 0.26 10/12/2024    VALPROICTOT 54 10/12/2024    AMMONIA 32 07/03/2024    [id number] 2.000 07/24/2024    HGBA1C 6.4 (H) 05/03/2024     05/03/2024       Mental Status Evaluation:  Appearance:  disheveled, overweight   Behavior:  cooperative, calm, guarded   Speech:  normal rate and volume   Mood:  less anxious, less depressed   Affect:  constricted   Thought Process:  illogical, perseverative, concrete   Thought Content:  [employer] and sexual preoccupation, paranoid ideation   Perceptual Disturbances: denies when asked, but talks to self at times   Risk Potential: Suicidal ideation -  None  Homicidal ideation - None  Potential for aggression - No   Memory:  recent and remote memory grossly intact   Sensorium  person, place, and time/date      Consciousness:  alert and awake   Attention: attention span and concentration are age appropriate   Insight:  limited   Judgment: limited   Gait/Station: normal gait/station   Motor Activity: no abnormal movements       Counseling / Coordination of Care:  Patient's progress reviewed with nursing staff.  Medications, treatment progress and treatment plan reviewed with patient.  Supportive counseling provided to the patient.    This note was completed in part utilizing Dragon dictation Software. Grammatical, translation, syntax errors, random word insertions, spelling mistakes, and incomplete sentences may be an occasional consequence of this system secondary to software limitations with voice recognition, ambient noise, and hardware issues. If you have any questions or concerns about the content, text, or information contained within the body of this dictation, please contact the provider for clarification

## 2024-10-25 NOTE — TREATMENT TEAM
Pt bright and cooperative in afternoon.  Pt volunteered to read poem to group (I said a prayer for you today).       10/25/24 0915 10/25/24 1000 10/25/24 1330   Activity/Group Checklist   Group Exercise Other (Comment)  ([facility]) Other (Comment)  (Poetry: reflection and change)   Attendance Attended Did not attend Attended;Other (Comment)  (left early)   Attendance Duration (min) 46-60  --  46-60   Interactions Interacted appropriately  --  Interacted appropriately   Affect/Mood Wide;Other (Comment)  (unprompted laughing fits)  --  Bright;Appropriate   Goals Achieved Able to engage in interactions;Able to listen to others  --  Identified feelings;Identified triggers;Increased hopefulness;Discussed coping strategies;Able to listen to others;Able to engage in interactions;Able to manage/cope with feelings;Able to reflect/comment on own behavior;Verbalized increased hopefulness;Able to self-disclose;Able to recieve feedback

## 2024-10-26 PROCEDURE — 99232 SBSQ HOSP IP/OBS MODERATE 35: CPT

## 2024-10-26 RX ADMIN — FAMOTIDINE 20 MG: 20 TABLET ORAL at 08:24

## 2024-10-26 RX ADMIN — SENNOSIDES AND DOCUSATE SODIUM 2 TABLET: 8.6; 5 TABLET ORAL at 21:22

## 2024-10-26 RX ADMIN — FAMOTIDINE 20 MG: 20 TABLET ORAL at 17:11

## 2024-10-26 RX ADMIN — PRAZOSIN HYDROCHLORIDE 2 MG: 1 CAPSULE ORAL at 21:22

## 2024-10-26 RX ADMIN — NICOTINE 1 PATCH: 7 PATCH, EXTENDED RELEASE TRANSDERMAL at 08:29

## 2024-10-26 RX ADMIN — TRAZODONE HYDROCHLORIDE 50 MG: 50 TABLET ORAL at 21:22

## 2024-10-26 RX ADMIN — LORAZEPAM 0.5 MG: 0.5 TABLET ORAL at 17:11

## 2024-10-26 RX ADMIN — LORAZEPAM 0.5 MG: 0.5 TABLET ORAL at 08:24

## 2024-10-26 RX ADMIN — VALPROIC ACID 1250 MG: 500 SOLUTION ORAL at 21:23

## 2024-10-26 RX ADMIN — CLOZAPINE 850 MG: 200 TABLET ORAL at 21:22

## 2024-10-26 RX ADMIN — CARVEDILOL 6.25 MG: 6.25 TABLET, FILM COATED ORAL at 17:11

## 2024-10-26 RX ADMIN — CYANOCOBALAMIN TAB 1000 MCG 1000 MCG: 1000 TAB at 08:24

## 2024-10-26 RX ADMIN — MELATONIN TAB 3 MG 3 MG: 3 TAB at 21:22

## 2024-10-26 RX ADMIN — Medication 15 ML: at 08:24

## 2024-10-26 RX ADMIN — FLUTICASONE FUROATE 1 PUFF: 100 POWDER RESPIRATORY (INHALATION) at 08:28

## 2024-10-26 RX ADMIN — ATORVASTATIN CALCIUM 10 MG: 10 TABLET, FILM COATED ORAL at 08:24

## 2024-10-26 RX ADMIN — POLYETHYLENE GLYCOL 3350 17 G: 17 POWDER, FOR SOLUTION ORAL at 08:24

## 2024-10-26 RX ADMIN — RISPERIDONE 1 MG: 1 TABLET, FILM COATED ORAL at 21:22

## 2024-10-26 NOTE — PLAN OF CARE
Problem: Ineffective Coping  Goal: Participates in unit activities  Description: Interventions:  - Provide therapeutic environment   - Provide required programming   - Redirect inappropriate behaviors   Outcome: Progressing     Problem: Risk for Self Injury/Neglect  Goal: Treatment Goal: Remain safe during length of stay, learn and adopt new coping skills, and be free of self-injurious ideation, impulses and acts at the time of discharge  Outcome: Progressing  Goal: Verbalize thoughts and feelings  Description: Interventions:  - Assess and re-assess patient's lethality and potential for self-injury  - Engage patient in 1:1 interactions, daily, for a minimum of 15 minutes  - Encourage patient to express feelings, fears, frustrations, hopes  - Establish rapport/trust with patient   Outcome: Progressing  Goal: Refrain from harming self  Description: Interventions:  - Monitor patient closely, per order  - Develop a trusting relationship  - Supervise medication ingestion, monitor effects and side effects   Outcome: Progressing  Goal: Attend and participate in unit activities, including therapeutic, recreational, and educational groups  Description: Interventions:  - Provide therapeutic and educational activities daily, encourage attendance and participation, and document same in the medical record  - Obtain collateral information, encourage visitation and family involvement in care   Outcome: Progressing  Goal: Recognize maladaptive responses and adopt new coping mechanisms  Outcome: Progressing

## 2024-10-26 NOTE — PROGRESS NOTES
Progress Note - Behavioral Health   Name: Meli Nowak 59 y.o. female I MRN: [id number]   Unit/Bed#: [facility] 651-02 I Date of Admission: 7/23/2024   Date of Service: 10/26/2024 I Hospital Day: 95         Assessment & Plan  Schizoaffective disorder, bipolar type (HCC)  Continue Depakote to 1250 mg nightly  Most recent VPA level 54 on 10/12/24  CMP on 10/12/24 reviewed, WNL  Clozaril to 850 mg nightly for schizoaffective disorder  Clozaril monitoring:  CRP, CBC/diff, CK QMon for monitoring  VSS, without evidence of tachycardia  Most recent ECG reviewed 9/9/24 - normal ECG, NSR  Clozaril level 10/7 was 711, last ANC on 10/14 was 3.80; Clozaril level 650 on 10/18/24, Clozaril increased to 850 mg PO at HS   Obtain Clozaril level 10/28/2024  Senna to prevent Clozaril induced constipation  Continue Risperdal to 1 mg qhs for ongoing psychosis   Off titrating due to minimal effects  Two antipsychotics utilized secondary to multiple failures of monotherapy. Plan to decrease Risperdal, wean off & discontinue once Clozaril is maximized given ongoing psychosis  Continue Ativan 0.5 mg twice daily for anxiety   Continue melatonin 3 mg nightly for insomnia  Continue prazosin 2 mg nightly for PTSD associated nightmares   Continue trazodone 50 mg nightly for insomnia  Discharge disposition: CRR            Recommended Treatment:     Treatment plan and medication changes discussed and per the attending physician the plan is:     1.Continue with group therapy, milieu therapy and occupational therapy  2.Behavioral Health checks every 15 minutes  3.Continue frequent safety checks and vitals per unit protocol  4.Continue with SLIM medical management as indicated  5.Continue with current medication regimen  6.Will review labs in the a.m.  7.Disposition Planning: Discharge planning and efforts remain ongoing     Planned medication and treatment changes:    All current active medications have been reviewed  Encourage group therapy, milieu  therapy and occupational therapy  Behavioral Health checks for safety monitoring  Continue treatment with group therapy, milieu therapy and occupational therapy  Continue current medications:    Current medications:  Current Facility-Administered Medications   Medication Dose Route Frequency Provider Last Rate    acetaminophen  650 mg Oral Q4H PRN Marie Ziegler, CRNP      acetaminophen  650 mg Oral Q4H PRN Marie Ziegler, CRNP      acetaminophen  975 mg Oral Q6H PRN Marie Ziegler, CHRISTOPHERNP      aluminum-magnesium hydroxide-simethicone  30 mL Oral Q4H PRN Parris Bolanos, JOSE ELIAS      Artificial Tears  1 drop Both Eyes Q6H PRN Dereje Banuelos MD      atorvastatin  10 mg Oral Daily Marie Ziegler, CRNP      bisacodyl  10 mg Oral Daily PRN Kristen Logan, CRNP      bisacodyl  10 mg Rectal Daily PRN Marie Ziegler, JOSE ELIAS      carvedilol  6.25 mg Oral BID With Meals Marie Ziegler, JOSE ELIAS      cloZAPine  850 mg Oral HS Marie Ziegler, CRNP      cyanocobalamin  1,000 mcg Oral Daily Marie Ziegler, CRNP      famotidine  20 mg Oral BID Shan Fitzgerald DO      fluticasone  1 puff Inhalation Daily Marie Ziegler, JOSE ELIAS      hydrOXYzine HCL  25 mg Oral Q6H PRN Max 4/day Marie Ziegler, CRNP      hydrOXYzine HCL  50 mg Oral Q6H PRN Max 4/day Marie Ziegler, JOSE ELIAS      ipratropium-albuterol  3 mL Nebulization Q4H PRN Darin Lawton MD      LORazepam  0.5 mg Oral BID Marie Ziegler, JOSE ELIAS      Or    LORazepam  0.5 mg Intramuscular BID Marie Ziegler, JOSE ELIAS      LORazepam  1 mg Intramuscular Q6H PRN Max 3/day Marie Ziegler, JOSE ELIAS      LORazepam  1 mg Oral Q6H PRN Max 3/day Marie Ziegler, JOSE ELIAS      melatonin  3 mg Oral HS Marie Ziegler, CRNP      mirtazapine  7.5 mg Oral HS PRN Marie Ziegler, JOSE ELIAS      Multivitamin  15 mL Oral Daily Marie Ziegler, JOSE ELIAS      nicotine  1 patch Transdermal Daily Marie Ziegler, CHRISTOPHERNP      OLANZapine  2.5 mg Oral Q6H PRN Dereje Banuelos MD      Or    OLANZapine  2.5 mg  "Intramuscular Q6H PRN Dereje Banuelos MD      OLANZapine  5 mg Oral Q6H PRN Dereje Banuelos MD      Or    OLANZapine  5 mg Intramuscular Q6H PRN Dereje Banuelos MD      ondansetron  4 mg Oral Q6H PRN Darin Lawton MD      polyethylene glycol  17 g Oral Daily Kristen Archuletamaynor, CRVALE      polyethylene glycol  17 g Oral Daily PRN JOSE ELIAS Figueroa      prazosin  2 mg Oral HS Marie Ziegler, CRVALE      risperiDONE  1 mg Oral HS JOSE ELIAS Figueroa      senna-docusate sodium  2 tablet Oral HS Rehana Borrego PA-C      traZODone  50 mg Oral HS Marie Zielger, JOSE ELIAS      valproic acid  1,250 mg Oral HS JOSE ELIAS Figueroa         Risks / Benefits of Treatment:    Risks, benefits, and possible side effects of medications explained to patient and patient verbalizes understanding and agreement for treatment.    Subjective:    Behavior over the last 24 hours: unchanged.     Per staff, Meli has been meal and medication compliant.  Continues to be observed responding to internal stimuli but in behavioral control.      Meli was seen in her room today for psychiatric follow up.  She reports feeling depressed today but denies suicidal ideation intent or plan.  She reports she has been having \"strange thoughts\" but did not want to expand on what these thoughts were.  She was somatically preoccupied asking for various medical PRNs during the interview.  She denied any active hallucinations but appears distracted.  She denies any medication side effects and reports having daily BMs.      Sleep: normal  Appetite: normal  Medication side effects: No   ROS: no complaints    Mental Status Evaluation:    Appearance:  disheveled, marginal hygiene   Behavior:  calm, guarded   Speech:  scant, tangential   Mood:  depressed   Affect:  constricted   Thought Process:  illogical, tangential, concrete   Associations: concrete associations   Thought Content:  somatic preoccupation, some paranoia   Perceptual Disturbances: denies " auditory or visual hallucinations when asked, appears distracted   Risk Potential: Suicidal ideation - None at present  Homicidal ideation - None  Potential for aggression - No   Sensorium:  oriented to person and place   Memory:  recent memory intact   Consciousness:  alert and awake   Attention/Concentration: attention span and concentration appear shorter than expected for age   Insight:  limited   Judgment: limited   Gait/Station: in bed   Motor Activity: no abnormal movements     Vital signs in last 24 hours:    Temp:  [97.5 °F (36.4 °C)-98 °F (36.7 °C)] 97.5 °F (36.4 °C)  HR:  [59-80] 78  BP: ()/(56-78) 99/62  Resp:  [16-20] 16  SpO2:  [94 %-100 %] 97 %  O2 Device: None (Room air)         Laboratory results: I have personally reviewed all pertinent laboratory/tests results    Results from the past 24 hours: No results found for this or any previous visit (from the past 24 hour(s)).  Most Recent Labs:   Lab Results   Component Value Date    WBC 9.21 10/21/2024    RBC 4.21 10/21/2024    HGB 13.3 10/21/2024    HCT 39.9 10/21/2024     10/21/2024    RDW 14.0 10/21/2024    NEUTROABS 4.08 10/21/2024    SODIUM 138 10/12/2024    K 4.1 10/12/2024     10/12/2024    CO2 29 10/12/2024    BUN 18 10/12/2024    CREATININE 0.97 10/12/2024    GLUC 146 (H) 10/12/2024    CALCIUM 9.2 10/12/2024    AST 12 (L) 10/12/2024    ALT 12 10/12/2024    ALKPHOS 84 10/12/2024    TP 6.7 10/12/2024    ALB 3.9 10/12/2024    TBILI 0.26 10/12/2024    VALPROICTOT 54 10/12/2024    AMMONIA 32 07/03/2024    [id number] 2.000 07/24/2024    HGBA1C 6.4 (H) 05/03/2024     05/03/2024       Suicide/Homicide Risk Assessment:    Risk of Harm to Self:   Nursing Suicide Risk Assessment Last 24 hours: C-SSRS Risk (Since Last Contact)  Calculated C-SSRS Risk Score (Since Last Contact): No Risk Indicated  Current Specific Risk Factors include: mental illness diagnosis, current psychotic symptoms  Protective Factors: no current suicidal  ideation, ability to communicate with staff on the unit, able to contract for safety on the unit, taking medications as ordered on the unit, improved psychotic symptoms, responds to redirection  Based on today's assessment, Meli presents the following risk of harm to self: low    Risk of Harm to Others:  Nursing Homicide Risk Assessment: Violence Risk to Others: Denies within past 6 months  Current Specific Risk Factors include: social difficulties  Protective Factors: no current homicidal ideation, psychotic symptoms are less prominent, compliant with medications on the unit as ordered, compliant with unit milieu, follows staff redirection  Based on today's assessment, Onyx presents the following risk of harm to others: low    The following interventions are recommended: Behavioral Health checks for safety monitoring, continued hospitalization on locked unit    Progress Toward Goals: progressing, attends groups, mood is stabilizing    Counseling / Coordination of Care:    Total floor / unit time spent today 15 minutes. Greater than 50% of total time was spent with the patient and / or family counseling and / or coordination of care. A description of counseling / coordination of care:    Administrative Statements   I have spent a total time of 30 minutes in caring for this patient on the day of the visit/encounter including Importance of tx compliance, Documenting in the medical record, Reviewing / ordering tests, medicine, procedures  , and Obtaining or reviewing history  .    JOSE ELIAS Malik 10/26/24

## 2024-10-26 NOTE — NURSING NOTE
"Patient is visible on the unit intermittently. During psych assessment, patient stated \"I'm not going ot lie, I'm having a lot of anxiety right now. I feel like I'm floating out of my body. I need to lay down.\" Patient also endorses auditory hallucinations stating \"They're telling me if I take the nicotine patch I'll be smoking a lot today.\" Patient continues to laugh inappropriately today. She is medication compliant. Encouraged to inform staff of any needs or concerns.    "

## 2024-10-27 VITALS
WEIGHT: 213.9 LBS | SYSTOLIC BLOOD PRESSURE: 136 MMHG | BODY MASS INDEX: 33.57 KG/M2 | HEART RATE: 86 BPM | TEMPERATURE: 98.1 F | HEIGHT: 67 IN | OXYGEN SATURATION: 95 % | DIASTOLIC BLOOD PRESSURE: 69 MMHG | RESPIRATION RATE: 16 BRPM

## 2024-10-27 PROCEDURE — 99232 SBSQ HOSP IP/OBS MODERATE 35: CPT

## 2024-10-27 RX ADMIN — VALPROIC ACID 1250 MG: 500 SOLUTION ORAL at 21:27

## 2024-10-27 RX ADMIN — Medication 15 ML: at 08:26

## 2024-10-27 RX ADMIN — PRAZOSIN HYDROCHLORIDE 2 MG: 1 CAPSULE ORAL at 21:26

## 2024-10-27 RX ADMIN — CARVEDILOL 6.25 MG: 6.25 TABLET, FILM COATED ORAL at 08:26

## 2024-10-27 RX ADMIN — LORAZEPAM 0.5 MG: 0.5 TABLET ORAL at 08:25

## 2024-10-27 RX ADMIN — FLUTICASONE FUROATE 1 PUFF: 100 POWDER RESPIRATORY (INHALATION) at 08:34

## 2024-10-27 RX ADMIN — FAMOTIDINE 20 MG: 20 TABLET ORAL at 08:26

## 2024-10-27 RX ADMIN — POLYETHYLENE GLYCOL 3350 17 G: 17 POWDER, FOR SOLUTION ORAL at 08:26

## 2024-10-27 RX ADMIN — CLOZAPINE 850 MG: 200 TABLET ORAL at 21:26

## 2024-10-27 RX ADMIN — FAMOTIDINE 20 MG: 20 TABLET ORAL at 17:00

## 2024-10-27 RX ADMIN — TRAZODONE HYDROCHLORIDE 50 MG: 50 TABLET ORAL at 21:26

## 2024-10-27 RX ADMIN — LORAZEPAM 0.5 MG: 0.5 TABLET ORAL at 17:00

## 2024-10-27 RX ADMIN — NICOTINE 1 PATCH: 7 PATCH, EXTENDED RELEASE TRANSDERMAL at 08:25

## 2024-10-27 RX ADMIN — CARVEDILOL 6.25 MG: 6.25 TABLET, FILM COATED ORAL at 17:00

## 2024-10-27 RX ADMIN — MELATONIN TAB 3 MG 3 MG: 3 TAB at 21:26

## 2024-10-27 RX ADMIN — CYANOCOBALAMIN TAB 1000 MCG 1000 MCG: 1000 TAB at 08:26

## 2024-10-27 RX ADMIN — ATORVASTATIN CALCIUM 10 MG: 10 TABLET, FILM COATED ORAL at 08:25

## 2024-10-27 RX ADMIN — SENNOSIDES AND DOCUSATE SODIUM 2 TABLET: 8.6; 5 TABLET ORAL at 21:26

## 2024-10-27 RX ADMIN — RISPERIDONE 1 MG: 1 TABLET, FILM COATED ORAL at 21:26

## 2024-10-27 NOTE — PLAN OF CARE
Problem: Alteration in Thoughts and Perception  Goal: Treatment Goal: Gain control of psychotic behaviors/thinking, reduce/eliminate presenting symptoms and demonstrate improved reality functioning upon discharge  Outcome: Progressing  Goal: Verbalize thoughts and feelings  Description: Interventions:  - Promote a nonjudgmental and trusting relationship with the patient through active listening and therapeutic communication  - Assess patient's level of functioning, behavior and potential for risk  - Engage patient in 1 on 1 interactions  - Encourage patient to express fears, feelings, frustrations, and discuss symptoms    - [place] patient to reality, help patient recognize reality-based thinking   - Administer medications as ordered and assess for potential side effects  - Provide the patient education related to the signs and symptoms of the illness and desired effects of prescribed medications  Outcome: Progressing  Goal: Refrain from acting on delusional thinking/internal stimuli  Description: Interventions:  - Monitor patient closely, per order   - Utilize least restrictive measures   - Set reasonable limits, give positive feedback for acceptable   - Administer medications as ordered and monitor of potential side effects  Outcome: Progressing  Goal: Agree to be compliant with medication regime, as prescribed and report medication side effects  Description: Interventions:  - Offer appropriate PRN medication and supervise ingestion; conduct AIMS, as needed   Outcome: Progressing  Goal: Recognize dysfunctional thoughts, communicate reality-based thoughts at the time of discharge  Description: Interventions:  - Provide medication and psycho-education to assist patient in compliance and developing insight into his/her illness   Outcome: Progressing  Goal: Complete daily ADLs, including personal hygiene independently, as able  Description: Interventions:  - Observe, teach, and assist patient with ADLS  - Monitor and  promote a balance of rest/activity, with adequate nutrition and elimination   Outcome: Progressing     Problem: Ineffective Coping  Goal: Identifies ineffective coping skills  Outcome: Progressing  Goal: Demonstrates healthy coping skills  Outcome: Progressing  Goal: Patient/Family participate in treatment and DC plans  Description: Interventions:  - Provide therapeutic environment  Outcome: Progressing  Goal: Patient/Family verbalizes awareness of resources  Outcome: Progressing  Goal: Understands least restrictive measures  Description: Interventions:  - Utilize least restrictive behavior  Outcome: Progressing  Goal: Free from restraint events  Description: - Utilize least restrictive measures   - Provide behavioral interventions   - Redirect inappropriate behaviors   Outcome: Progressing     Problem: Risk for Self Injury/Neglect  Goal: Treatment Goal: Remain safe during length of stay, learn and adopt new coping skills, and be free of self-injurious ideation, impulses and acts at the time of discharge  Outcome: Progressing  Goal: Verbalize thoughts and feelings  Description: Interventions:  - Assess and re-assess patient's lethality and potential for self-injury  - Engage patient in 1:1 interactions, daily, for a minimum of 15 minutes  - Encourage patient to express feelings, fears, frustrations, hopes  - Establish rapport/trust with patient   Outcome: Progressing     Problem: Depression  Goal: Treatment Goal: Demonstrate behavioral control of depressive symptoms, verbalize feelings of improved mood/affect, and adopt new coping skills prior to discharge  Outcome: Progressing  Goal: Verbalize thoughts and feelings  Description: Interventions:  - Assess and re-assess patient's level of risk   - Engage patient in 1:1 interactions, daily, for a minimum of 15 minutes   - Encourage patient to express feelings, fears, frustrations, hopes   Outcome: Progressing  Goal: Refrain from isolation  Description:  Interventions:  - Develop a trusting relationship   - Encourage socialization   Outcome: Progressing  Goal: Refrain from self-neglect  Outcome: Progressing     Problem: Anxiety  Goal: Anxiety is at manageable level  Description: Interventions:  - Assess and monitor patient's anxiety level.   - Monitor for signs and symptoms (heart palpitations, chest pain, shortness of breath, headaches, nausea, feeling jumpy, restlessness, irritable, apprehensive).   - Collaborate with interdisciplinary team and initiate plan and interventions as ordered.  - [place] patient to unit/surroundings  - Explain treatment plan  - Encourage participation in care  - Encourage verbalization of concerns/fears  - Identify coping mechanisms  - Assist in developing anxiety-reducing skills  - Administer/offer alternative therapies  - Limit or eliminate stimulants  Outcome: Progressing

## 2024-10-27 NOTE — NURSING NOTE
Patient is visible on the unit for meals then isolates to her room. She endorses anxiety and auditory hallucinations this shift. Patient responding to internal stimuli and seen laughing and talking to herself. She is medication compliant. Encouraged to inform staff of any needs or concerns.

## 2024-10-27 NOTE — NURSING NOTE
Patient is cooperative with care. Denies all psych s/s. During hs medication pass patient was responding to internal stimuli but denies A/VH when asked by this writer. Medication compliant. Patient was visible intermittently on the unit during this shift. Patient encouraged to make needs known. Safety checks ongoing.

## 2024-10-27 NOTE — PROGRESS NOTES
Progress Note - Behavioral Health   Name: Meli Nowak 59 y.o. female I MRN: [id number]   Unit/Bed#: [facility] 651-02 I Date of Admission: 7/23/2024   Date of Service: 10/27/2024 I Hospital Day: 96         Assessment & Plan  Schizoaffective disorder, bipolar type (HCC)  Continue Depakote to 1250 mg nightly  Most recent VPA level 54 on 10/12/24  CMP on 10/12/24 reviewed, WNL  Clozaril to 850 mg nightly for schizoaffective disorder  Clozaril monitoring:  CRP, CBC/diff, CK QMon for monitoring  VSS, without evidence of tachycardia  Most recent ECG reviewed 9/9/24 - normal ECG, NSR  Clozaril level 10/7 was 711, last ANC on 10/14 was 3.80; Clozaril level 650 on 10/18/24, Clozaril increased to 850 mg PO at HS   Obtain Clozaril level 10/28/2024  Senna to prevent Clozaril induced constipation  Continue Risperdal to 1 mg qhs for ongoing psychosis   Off titrating due to minimal effects  Two antipsychotics utilized secondary to multiple failures of monotherapy. Plan to decrease Risperdal, wean off & discontinue once Clozaril is maximized given ongoing psychosis  Continue Ativan 0.5 mg twice daily for anxiety   Continue melatonin 3 mg nightly for insomnia  Continue prazosin 2 mg nightly for PTSD associated nightmares   Continue trazodone 50 mg nightly for insomnia  Discharge disposition: CRR          Recommended Treatment:   Treatment plan and medication changes discussed and per the attending physician the plan is:     1.Continue with group therapy, milieu therapy and occupational therapy  2.Behavioral Health checks every 15 minutes  3.Continue frequent safety checks and vitals per unit protocol  4.Continue with SLIM medical management as indicated  5.Continue with current medication regimen  6.Will review labs in the a.m.  7.Disposition Planning: Discharge planning and efforts remain ongoing     Planned medication and treatment changes:    All current active medications have been reviewed  Encourage group therapy, milieu therapy  and occupational therapy  Behavioral Health checks for safety monitoring  Continue treatment with group therapy, milieu therapy and occupational therapy  Discharge planning  Continue current medications:    Current medications:  Current Facility-Administered Medications   Medication Dose Route Frequency Provider Last Rate    acetaminophen  650 mg Oral Q4H PRN Marie Ziegler, CRNP      acetaminophen  650 mg Oral Q4H PRN Marie Ziegler, CRNP      acetaminophen  975 mg Oral Q6H PRN Marie Ziegler, JOSE ELIAS      aluminum-magnesium hydroxide-simethicone  30 mL Oral Q4H PRN Parris Bolanos, JOSE ELIAS      Artificial Tears  1 drop Both Eyes Q6H PRN Dereje Banuelos MD      atorvastatin  10 mg Oral Daily Marie Ziegler, CRNP      bisacodyl  10 mg Oral Daily PRN Kristen Logan, CRNP      bisacodyl  10 mg Rectal Daily PRN Marie Ziegler, JOSE ELIAS      carvedilol  6.25 mg Oral BID With Meals Marie Ziegler, JOSE ELIAS      cloZAPine  850 mg Oral HS Marie Ziegler, CRNP      cyanocobalamin  1,000 mcg Oral Daily Marie Ziegler, CRNP      famotidine  20 mg Oral BID Shan Fitzgerald DO      fluticasone  1 puff Inhalation Daily Marie Ziegler, JOSE ELIAS      hydrOXYzine HCL  25 mg Oral Q6H PRN Max 4/day Marie Ziegler, CRNP      hydrOXYzine HCL  50 mg Oral Q6H PRN Max 4/day Marie Ziegler, JOSE ELIAS      ipratropium-albuterol  3 mL Nebulization Q4H PRN Darin Lawton MD      LORazepam  0.5 mg Oral BID Marie Ziegler, JOSE ELIAS      Or    LORazepam  0.5 mg Intramuscular BID Marie Ziegler, JOSE ELIAS      LORazepam  1 mg Intramuscular Q6H PRN Max 3/day Marie Ziegler, JOSE ELIAS      LORazepam  1 mg Oral Q6H PRN Max 3/day Marie Ziegler, JOSE ELIAS      melatonin  3 mg Oral HS Marie Ziegler, CRNP      mirtazapine  7.5 mg Oral HS PRN Marie Ziegler, JOSE ELIAS      Multivitamin  15 mL Oral Daily Marie Ziegler, JOSE ELIAS      nicotine  1 patch Transdermal Daily Marie Ziegler, CHRISTOPHERNP      OLANZapine  2.5 mg Oral Q6H PRN Dereje Banuelos MD      Or    OLANZapine  2.5  mg Intramuscular Q6H PRN Dereje Banuelos MD      OLANZapine  5 mg Oral Q6H PRN Dereje Banuelos MD      Or    OLANZapine  5 mg Intramuscular Q6H PRN Dereje Banuelos MD      ondansetron  4 mg Oral Q6H PRN Darin Lawton MD      polyethylene glycol  17 g Oral Daily Kristen Logan, CRNP      polyethylene glycol  17 g Oral Daily PRN JOSE ELIAS Figueroa      prazosin  2 mg Oral HS Marie Ziegler, CRVALE      risperiDONE  1 mg Oral HS JOSE ELIAS Figueroa      senna-docusate sodium  2 tablet Oral HS Rehana Borrego PA-C      traZODone  50 mg Oral HS Marie Ziegler, JOSE ELIAS      valproic acid  1,250 mg Oral HS JOSE ELIAS Figueroa         Risks / Benefits of Treatment:    Risks, benefits, and possible side effects of medications explained to patient and patient verbalizes understanding and agreement for treatment.    Subjective:    Behavior over the last 24 hours: unchanged.     Per staff, Meli has been cooperative with care.  She continues to respond to internal stimuli.  She has been meal and medication compliant.      Meli was seen in her room today for psychiatric follow up.  She reports that she had increased anxiety and depression yesterday but improved today.  She continues to be somatic but redirectable.  She remains internally preoccupied but denies AH/VH.  She denies suicidal ideation, intent or plan.  She denies side effects from mediations and none observed on exam.  Having daily BMs and no cardiac symptoms.     Sleep: normal  Appetite: normal  Medication side effects: No   ROS: no complaints    Mental Status Evaluation:    Appearance:  disheveled, marginal hygiene   Behavior:  cooperative, calm, bizarre   Speech:  normal rate and volume   Mood:  less anxious, less depressed   Affect:  constricted   Thought Process:  circumstantial, perseverative, concrete   Associations: circumstantial associations   Thought Content:  Congregation preoccupation, paranoid ideation   Perceptual Disturbances: denies  auditory or visual hallucinations when asked, appears responding to internal stimuli   Risk Potential: Suicidal ideation - None  Homicidal ideation - None  Potential for aggression - Not at present   Sensorium:  oriented to person, place, and situation   Memory:  recent memory intact   Consciousness:  alert and awake   Attention/Concentration: attention span and concentration appear shorter than expected for age   Insight:  limited   Judgment: limited   Gait/Station: in bed   Motor Activity: no abnormal movements     Vital signs in last 24 hours:    Temp:  [96.4 °F (35.8 °C)-97.5 °F (36.4 °C)] 97.5 °F (36.4 °C)  HR:  [71-80] 80  BP: (123-129)/(62-63) 129/63  Resp:  [18] 18  SpO2:  [92 %-97 %] 92 %  O2 Device: None (Room air)         Laboratory results: I have personally reviewed all pertinent laboratory/tests results    Results from the past 24 hours: No results found for this or any previous visit (from the past 24 hour(s)).  Most Recent Labs:   Lab Results   Component Value Date    WBC 9.21 10/21/2024    RBC 4.21 10/21/2024    HGB 13.3 10/21/2024    HCT 39.9 10/21/2024     10/21/2024    RDW 14.0 10/21/2024    NEUTROABS 4.08 10/21/2024    SODIUM 138 10/12/2024    K 4.1 10/12/2024     10/12/2024    CO2 29 10/12/2024    BUN 18 10/12/2024    CREATININE 0.97 10/12/2024    GLUC 146 (H) 10/12/2024    CALCIUM 9.2 10/12/2024    AST 12 (L) 10/12/2024    ALT 12 10/12/2024    ALKPHOS 84 10/12/2024    TP 6.7 10/12/2024    ALB 3.9 10/12/2024    TBILI 0.26 10/12/2024    VALPROICTOT 54 10/12/2024    AMMONIA 32 07/03/2024    GNE5HGNVLQYA 2.000 07/24/2024    HGBA1C 6.4 (H) 05/03/2024     05/03/2024       Suicide/Homicide Risk Assessment:    Risk of Harm to Self:   Nursing Suicide Risk Assessment Last 24 hours: C-SSRS Risk (Since Last Contact)  Calculated C-SSRS Risk Score (Since Last Contact): No Risk Indicated  Current Specific Risk Factors include: mental illness diagnosis  Protective Factors: no current  suicidal ideation, ability to communicate with staff on the unit, able to contract for safety on the unit, taking medications as ordered on the unit, improved depressive symptoms, improved anxiety symptoms, improved psychotic symptoms, responds to redirection  Based on today's assessment, Meli presents the following risk of harm to self: low    Risk of Harm to Others:  Nursing Homicide Risk Assessment: Violence Risk to Others: Denies within past 6 months  Current Specific Risk Factors include: social difficulties  Protective Factors: no current homicidal ideation, psychotic symptoms are less prominent, compliant with medications on the unit as ordered, compliant with unit milieu, follows staff redirection  Based on today's assessment, [place] presents the following risk of harm to others: low    The following interventions are recommended: Behavioral Health checks for safety monitoring, continued hospitalization on locked unit    Progress Toward Goals: progressing, participates in milieu therapy, reality testing remains poor    Counseling / Coordination of Care:    Total floor / unit time spent today 15 minutes. Greater than 50% of total time was spent with the patient and / or family counseling and / or coordination of care. A description of counseling / coordination of care:    Administrative Statements   I have spent a total time of 30 minutes in caring for this patient on the day of the visit/encounter including Diagnostic results, Documenting in the medical record, Reviewing / ordering tests, medicine, procedures  , Obtaining or reviewing history  , and Communicating with other healthcare professionals .    JOSE ELIAS Malik 10/27/24

## 2024-10-28 LAB
BASOPHILS # BLD AUTO: 0.08 THOUSANDS/ΜL (ref 0–0.1)
BASOPHILS NFR BLD AUTO: 1 % (ref 0–1)
CK SERPL-CCNC: 26 U/L (ref 26–192)
CLOZAPINE SERPL-MCNC: 660 NG/ML (ref 350–900)
CRP SERPL QL: 1.9 MG/L
EOSINOPHIL # BLD AUTO: 0 THOUSAND/ΜL (ref 0–0.61)
EOSINOPHIL NFR BLD AUTO: 0 % (ref 0–6)
ERYTHROCYTE [DISTWIDTH] IN BLOOD BY AUTOMATED COUNT: 13.7 % (ref 11.6–15.1)
HCT VFR BLD AUTO: 43 % (ref 34.8–46.1)
HGB BLD-MCNC: 13.5 G/DL (ref 11.5–15.4)
IMM GRANULOCYTES # BLD AUTO: 0.03 THOUSAND/UL (ref 0–0.2)
IMM GRANULOCYTES NFR BLD AUTO: 0 % (ref 0–2)
LYMPHOCYTES # BLD AUTO: 3.51 THOUSANDS/ΜL (ref 0.6–4.47)
LYMPHOCYTES NFR BLD AUTO: 43 % (ref 14–44)
MCH RBC QN AUTO: 31 PG (ref 26.8–34.3)
MCHC RBC AUTO-ENTMCNC: 31.4 G/DL (ref 31.4–37.4)
MCV RBC AUTO: 99 FL (ref 82–98)
MONOCYTES # BLD AUTO: 0.93 THOUSAND/ΜL (ref 0.17–1.22)
MONOCYTES NFR BLD AUTO: 11 % (ref 4–12)
NEUTROPHILS # BLD AUTO: 3.61 THOUSANDS/ΜL (ref 1.85–7.62)
NEUTS SEG NFR BLD AUTO: 45 % (ref 43–75)
NRBC BLD AUTO-RTO: 0 /100 WBCS
PLATELET # BLD AUTO: 239 THOUSANDS/UL (ref 149–390)
PMV BLD AUTO: 9.8 FL (ref 8.9–12.7)
RBC # BLD AUTO: 4.36 MILLION/UL (ref 3.81–5.12)
WBC # BLD AUTO: 8.16 THOUSAND/UL (ref 4.31–10.16)

## 2024-10-28 PROCEDURE — 85025 COMPLETE CBC W/AUTO DIFF WBC: CPT

## 2024-10-28 PROCEDURE — 86140 C-REACTIVE PROTEIN: CPT

## 2024-10-28 PROCEDURE — 99232 SBSQ HOSP IP/OBS MODERATE 35: CPT | Performed by: STUDENT IN AN ORGANIZED HEALTH CARE EDUCATION/TRAINING PROGRAM

## 2024-10-28 PROCEDURE — 80159 DRUG ASSAY CLOZAPINE: CPT

## 2024-10-28 PROCEDURE — 82550 ASSAY OF CK (CPK): CPT

## 2024-10-28 RX ADMIN — CLOZAPINE 850 MG: 200 TABLET ORAL at 21:29

## 2024-10-28 RX ADMIN — FLUTICASONE FUROATE 1 PUFF: 100 POWDER RESPIRATORY (INHALATION) at 08:29

## 2024-10-28 RX ADMIN — CARVEDILOL 6.25 MG: 6.25 TABLET, FILM COATED ORAL at 08:25

## 2024-10-28 RX ADMIN — LORAZEPAM 0.5 MG: 0.5 TABLET ORAL at 08:25

## 2024-10-28 RX ADMIN — ACETAMINOPHEN 975 MG: 325 TABLET, FILM COATED ORAL at 21:28

## 2024-10-28 RX ADMIN — POLYETHYLENE GLYCOL 3350 17 G: 17 POWDER, FOR SOLUTION ORAL at 08:25

## 2024-10-28 RX ADMIN — Medication 15 ML: at 08:25

## 2024-10-28 RX ADMIN — CYANOCOBALAMIN TAB 1000 MCG 1000 MCG: 1000 TAB at 08:25

## 2024-10-28 RX ADMIN — CARVEDILOL 6.25 MG: 6.25 TABLET, FILM COATED ORAL at 17:05

## 2024-10-28 RX ADMIN — FAMOTIDINE 20 MG: 20 TABLET ORAL at 17:05

## 2024-10-28 RX ADMIN — PRAZOSIN HYDROCHLORIDE 2 MG: 1 CAPSULE ORAL at 21:29

## 2024-10-28 RX ADMIN — NICOTINE 1 PATCH: 7 PATCH, EXTENDED RELEASE TRANSDERMAL at 08:26

## 2024-10-28 RX ADMIN — MELATONIN TAB 3 MG 3 MG: 3 TAB at 21:29

## 2024-10-28 RX ADMIN — ATORVASTATIN CALCIUM 10 MG: 10 TABLET, FILM COATED ORAL at 08:25

## 2024-10-28 RX ADMIN — FAMOTIDINE 20 MG: 20 TABLET ORAL at 08:25

## 2024-10-28 RX ADMIN — SENNOSIDES AND DOCUSATE SODIUM 2 TABLET: 8.6; 5 TABLET ORAL at 21:29

## 2024-10-28 RX ADMIN — LORAZEPAM 0.5 MG: 0.5 TABLET ORAL at 17:05

## 2024-10-28 RX ADMIN — VALPROIC ACID 1250 MG: 500 SOLUTION ORAL at 21:29

## 2024-10-28 RX ADMIN — TRAZODONE HYDROCHLORIDE 50 MG: 50 TABLET ORAL at 21:29

## 2024-10-28 NOTE — NURSING NOTE
Patient slept throughout the shift, awake to take scheduled hs medications. Patient denies all psych s/s. Medication compliant. Patient encouraged to make needs known. Safety checks ongoing.

## 2024-10-28 NOTE — PROGRESS NOTES
Progress Note - Behavioral Health   Name: Meli Nowak 59 y.o. female I MRN: [id number]  Unit/Bed#: [facility] 651-02 I Date of Admission: 7/23/2024   Date of Service: 10/28/2024 I Hospital Day: 97     Assessment & Plan  Schizoaffective disorder, bipolar type (HCC)  Continue Depakote to 1250 mg nightly  Most recent VPA level 54 on 10/12/24  CMP on 10/12/24 reviewed, WNL  Clozaril to 850 mg nightly for schizoaffective disorder  Clozaril monitoring:  CRP, CBC/diff, CK QMon for monitoring  VSS, without evidence of tachycardia  Most recent ECG reviewed 9/9/24 - normal ECG, NSR  Clozaril level 10/7 was 711, last ANC on 10/28 was 3.61; Clozaril level 650 on 10/18/24, Clozaril increased to 850 mg PO at HS   Obtain Clozaril level 10/28/2024  Senna to prevent Clozaril induced constipation  Discontinue Risperdal to 1 mg qhs for ongoing psychosis   Off titrating due to minimal effects  Two antipsychotics utilized secondary to multiple failures of monotherapy. Plan to decrease Risperdal, wean off & discontinue once Clozaril is maximized given ongoing psychosis  Continue Ativan 0.5 mg twice daily for anxiety   Continue melatonin 3 mg nightly for insomnia  Continue prazosin 2 mg nightly for PTSD associated nightmares   Continue trazodone 50 mg nightly for insomnia  Discharge disposition: CRR          Plan   Progress Toward Goals:   Meli is progressing towards goals of inpatient psychiatric treatment by continued medication compliance and is attending therapeutic modalities on the milieu. However, the patient continues to require inpatient psychiatric hospitalization for continued medication management and titration to optimize symptom reduction, improve sleep hygiene, and demonstrate adequate self-care.    Recommended Treatment: Treatment plan and medication changes discussed and per the attending physician the plan is:    1.Continue with group therapy, milieu therapy and occupational therapy  2.Behavioral Health checks every 7  minutes  3.Continue frequent safety checks and vitals per unit protocol  4.Continue with SLIM medical management as indicated  5.Continue with current medication regimen  6.Will review labs in the a.m.  7.Disposition Planning: Discharge planning and efforts remain ongoing    Behavioral Health Medications: all current active meds have been reviewed and continue current psychiatric medications.  Current Facility-Administered Medications   Medication Dose Route Frequency Provider Last Rate    acetaminophen  650 mg Oral Q4H PRN Marie Ziegler, CRNP      acetaminophen  650 mg Oral Q4H PRN Marie Ziegler, CRNP      acetaminophen  975 mg Oral Q6H PRN Marie Ziegler, JOSE ELIAS      aluminum-magnesium hydroxide-simethicone  30 mL Oral Q4H PRN Parris Bolanos, JOSE ELIAS      Artificial Tears  1 drop Both Eyes Q6H PRN Dereje Banuelos MD      atorvastatin  10 mg Oral Daily Marie Ziegler, CRNP      bisacodyl  10 mg Oral Daily PRN Kristen Logan, CRNP      bisacodyl  10 mg Rectal Daily PRN Marie Ziegler, JOSE ELIAS      carvedilol  6.25 mg Oral BID With Meals Marie Ziegler, JOSE ELIAS      cloZAPine  850 mg Oral HS Marie Ziegler, CRNP      cyanocobalamin  1,000 mcg Oral Daily Marie Ziegler, CRNP      famotidine  20 mg Oral BID Shan Fitzgerald,       fluticasone  1 puff Inhalation Daily Marie Ziegler, JOSE ELIAS      hydrOXYzine HCL  25 mg Oral Q6H PRN Max 4/day Marie Ziegler, CRNP      hydrOXYzine HCL  50 mg Oral Q6H PRN Max 4/day Marie Ziegler, JOSE ELIAS      ipratropium-albuterol  3 mL Nebulization Q4H PRN Darin Lawton MD      LORazepam  0.5 mg Oral BID JOSE ELIAS Figueroa      Or    LORazepam  0.5 mg Intramuscular BID Marie Ziegler, JOSE ELIAS      LORazepam  1 mg Intramuscular Q6H PRN Max 3/day Marie Ziegler, JOSE ELIAS      LORazepam  1 mg Oral Q6H PRN Max 3/day JOSE ELIAS Figueroa      melatonin  3 mg Oral HS Marie Ziegler, CRNP      mirtazapine  7.5 mg Oral HS PRN Marie Ziegler, CHRISTOPHERNP      Multivitamin  15 mL Oral Daily  JOSE ELIAS Figueroa      nicotine  1 patch Transdermal Daily JOSE ELIAS Figueroa      OLANZapine  2.5 mg Oral Q6H PRN Dereje Banuelos MD      Or    OLANZapine  2.5 mg Intramuscular Q6H PRN Dereje Banuelos MD      OLANZapine  5 mg Oral Q6H PRN Dereje Banuelos MD      Or    OLANZapine  5 mg Intramuscular Q6H PRN Dereje Banuelos MD      ondansetron  4 mg Oral Q6H PRN Darin Lawton MD      polyethylene glycol  17 g Oral Daily JOSE ELIAS Ortega      polyethylene glycol  17 g Oral Daily PRN JOSE ELIAS Figueroa      prazosin  2 mg Oral HS JOSE ELIAS Figueroa      senna-docusate sodium  2 tablet Oral HS Rehana Borrego PA-C      traZODone  50 mg Oral HS JOSE ELIAS Figueroa      valproic acid  1,250 mg Oral HS JOSE ELIAS Figueroa         Risks / Benefits of Treatment:  Risks, benefits, and possible side effects of medications explained to patient and patient verbalizes understanding and agreement for treatment.       Subjective    Patient was seen today for continuation of care, records reviewed and patient was discussed with the morning case review team.    Meli was seen today for psychiatric follow-up.  On assessment today, Meli was more paranoid and suspicious.  Currently seen lying in her room, patient is visibly guarded and superficially cooperative with interview.  No symptoms currently reported, patient remains internally preoccupied and is noted to be mumbling to herself at times.  Clozaril currently prescribed at 850 mg nightly, we will obtain Clozaril level this evening with possible up titration.  CBC, CRP and CK obtained for continued drug management, ANC 3.61.  CRP and CK within normal limits.  Bowel regimen reviewed due to elevated risk of constipation, patient reports daily stools.  Risperdal also to be discontinued due to minimal improvement in symptoms and reduction of polypharmacy.  Sleep and appetite remain unchanged.  Tentative discharge pending CRR placement.    Meli denies  acute suicidal/self-harm ideation/intent/plan upon direct inquiry at this time.  Impulse control is intact.  Meli remains adherent to her current psychotropic medication regimen and denies any side effects from medications, as well as none noted on exam.    Psychiatric Review of Systems:  Behavior over the last 24 hours:  unchanged.   Sleep: good  Appetite: good  Medication side effects: none  ROS: no complaints, denies shortness of breath or chest pain and all other systems are negative for acute changes        Objective    Vitals:  Vitals:    10/28/24 0725   BP: 131/60   Pulse: 76   Resp: 17   Temp: 98.6 °F (37 °C)   SpO2: 97%       Laboratory Results:  I have personally reviewed all pertinent laboratory/tests results.  Most Recent Labs:   Lab Results   Component Value Date    WBC 8.16 10/28/2024    RBC 4.36 10/28/2024    HGB 13.5 10/28/2024    HCT 43.0 10/28/2024     10/28/2024    RDW 13.7 10/28/2024    NEUTROABS 3.61 10/28/2024    SODIUM 138 10/12/2024    K 4.1 10/12/2024     10/12/2024    CO2 29 10/12/2024    BUN 18 10/12/2024    CREATININE 0.97 10/12/2024    GLUC 146 (H) 10/12/2024    GLUF 98 08/10/2024    CALCIUM 9.2 10/12/2024    AST 12 (L) 10/12/2024    ALT 12 10/12/2024    ALKPHOS 84 10/12/2024    TP 6.7 10/12/2024    ALB 3.9 10/12/2024    TBILI 0.26 10/12/2024    VALPROICTOT 54 10/12/2024    AMMONIA 32 07/03/2024    [id number] 2.000 07/24/2024    HGBA1C 6.4 (H) 05/03/2024     05/03/2024       Mental Status Evaluation:  Appearance:  disheveled, marginal hygiene   Behavior:  bizarre, guarded   Speech:  normal rate and volume   Mood:  slightly more anxious, less depressed   Affect:  constricted   Thought Process:  illogical, tangential, perseverative, concrete   Thought Content:  [employer] preoccupation, paranoid ideation   Perceptual Disturbances: appears preoccupied, denies when asked, but talks to self at times   Risk Potential: Suicidal ideation - None  Homicidal ideation -  None  Potential for aggression - No   Memory:  recent and remote memory grossly intact   Sensorium  person, place, and time/date      Consciousness:  alert and awake   Attention: attention span and concentration are age appropriate   Insight:  limited   Judgment: limited   Gait/Station: normal gait/station   Motor Activity: no abnormal movements       Counseling / Coordination of Care:  Patient's progress reviewed with nursing staff.  Medications, treatment progress and treatment plan reviewed with patient.  Supportive counseling provided to the patient.    This note was completed in part utilizing Dragon dictation Software. Grammatical, translation, syntax errors, random word insertions, spelling mistakes, and incomplete sentences may be an occasional consequence of this system secondary to software limitations with voice recognition, ambient noise, and hardware issues. If you have any questions or concerns about the content, text, or information contained within the body of this dictation, please contact the provider for clarification

## 2024-10-28 NOTE — PLAN OF CARE
Problem: Alteration in Thoughts and Perception  Goal: Treatment Goal: Gain control of psychotic behaviors/thinking, reduce/eliminate presenting symptoms and demonstrate improved reality functioning upon discharge  Outcome: Progressing  Goal: Verbalize thoughts and feelings  Description: Interventions:  - Promote a nonjudgmental and trusting relationship with the patient through active listening and therapeutic communication  - Assess patient's level of functioning, behavior and potential for risk  - Engage patient in 1 on 1 interactions  - Encourage patient to express fears, feelings, frustrations, and discuss symptoms    - [place] patient to reality, help patient recognize reality-based thinking   - Administer medications as ordered and assess for potential side effects  - Provide the patient education related to the signs and symptoms of the illness and desired effects of prescribed medications  Outcome: Progressing  Goal: Refrain from acting on delusional thinking/internal stimuli  Description: Interventions:  - Monitor patient closely, per order   - Utilize least restrictive measures   - Set reasonable limits, give positive feedback for acceptable   - Administer medications as ordered and monitor of potential side effects  Outcome: Progressing  Goal: Agree to be compliant with medication regime, as prescribed and report medication side effects  Description: Interventions:  - Offer appropriate PRN medication and supervise ingestion; conduct AIMS, as needed   Outcome: Progressing  Goal: Recognize dysfunctional thoughts, communicate reality-based thoughts at the time of discharge  Description: Interventions:  - Provide medication and psycho-education to assist patient in compliance and developing insight into his/her illness   Outcome: Progressing  Goal: Complete daily ADLs, including personal hygiene independently, as able  Description: Interventions:  - Observe, teach, and assist patient with ADLS  - Monitor and  promote a balance of rest/activity, with adequate nutrition and elimination   Outcome: Progressing     Problem: Risk for Self Injury/Neglect  Goal: Treatment Goal: Remain safe during length of stay, learn and adopt new coping skills, and be free of self-injurious ideation, impulses and acts at the time of discharge  Outcome: Progressing  Goal: Verbalize thoughts and feelings  Description: Interventions:  - Assess and re-assess patient's lethality and potential for self-injury  - Engage patient in 1:1 interactions, daily, for a minimum of 15 minutes  - Encourage patient to express feelings, fears, frustrations, hopes  - Establish rapport/trust with patient   Outcome: Progressing  Goal: Refrain from harming self  Description: Interventions:  - Monitor patient closely, per order  - Develop a trusting relationship  - Supervise medication ingestion, monitor effects and side effects   Outcome: Progressing  Goal: Recognize maladaptive responses and adopt new coping mechanisms  Outcome: Progressing     Problem: Depression  Goal: Treatment Goal: Demonstrate behavioral control of depressive symptoms, verbalize feelings of improved mood/affect, and adopt new coping skills prior to discharge  Outcome: Progressing  Goal: Refrain from isolation  Description: Interventions:  - Develop a trusting relationship   - Encourage socialization   Outcome: Progressing  Goal: Refrain from self-neglect  Outcome: Progressing     Problem: Anxiety  Goal: Anxiety is at manageable level  Description: Interventions:  - Assess and monitor patient's anxiety level.   - Monitor for signs and symptoms (heart palpitations, chest pain, shortness of breath, headaches, nausea, feeling jumpy, restlessness, irritable, apprehensive).   - Collaborate with interdisciplinary team and initiate plan and interventions as ordered.  - [place] patient to unit/surroundings  - Explain treatment plan  - Encourage participation in care  - Encourage verbalization of  concerns/fears  - Identify coping mechanisms  - Assist in developing anxiety-reducing skills  - Administer/offer alternative therapies  - Limit or eliminate stimulants  Outcome: Progressing

## 2024-10-28 NOTE — NURSING NOTE
Patient is withdrawn to her room today. She is paranoid and suspicious towards staff. She is seen responding to internal stimuli but is unwilling to conform that she is hearing auditory hallucinations. She is medication compliant and cooperative with mouth checks. Encouraged to inform staff of any needs or concerns.

## 2024-10-28 NOTE — PROGRESS NOTES
10/28/24 0810   Team Meeting   Meeting Type Daily Rounds   Initial Conference Date 10/28/24   Next Conference Date 10/29/24   Team Members Present   Team Members Present Physician;Nurse;;   Physician Team Member Dr Banuelos, JAIR Goldman   Nursing Team Member Clarissa   Care Management Team Member Anum   Social Work Team Member Ayse   Patient/Family Present   Patient Present No   Patient's Family Present No     D/c risperdal, lithium level this evening. Discharge pending CRR

## 2024-10-29 PROCEDURE — 99232 SBSQ HOSP IP/OBS MODERATE 35: CPT | Performed by: STUDENT IN AN ORGANIZED HEALTH CARE EDUCATION/TRAINING PROGRAM

## 2024-10-29 RX ORDER — PRAZOSIN HYDROCHLORIDE 1 MG/1
3 CAPSULE ORAL
Status: DISCONTINUED | OUTPATIENT
Start: 2024-10-29 | End: 2024-11-07

## 2024-10-29 RX ADMIN — TRAZODONE HYDROCHLORIDE 50 MG: 50 TABLET ORAL at 21:25

## 2024-10-29 RX ADMIN — ATORVASTATIN CALCIUM 10 MG: 10 TABLET, FILM COATED ORAL at 08:23

## 2024-10-29 RX ADMIN — MELATONIN TAB 3 MG 3 MG: 3 TAB at 21:26

## 2024-10-29 RX ADMIN — CARVEDILOL 6.25 MG: 6.25 TABLET, FILM COATED ORAL at 08:23

## 2024-10-29 RX ADMIN — CYANOCOBALAMIN TAB 1000 MCG 1000 MCG: 1000 TAB at 08:23

## 2024-10-29 RX ADMIN — POLYETHYLENE GLYCOL 3350 17 G: 17 POWDER, FOR SOLUTION ORAL at 08:23

## 2024-10-29 RX ADMIN — LORAZEPAM 0.5 MG: 0.5 TABLET ORAL at 17:05

## 2024-10-29 RX ADMIN — SENNOSIDES AND DOCUSATE SODIUM 2 TABLET: 8.6; 5 TABLET ORAL at 21:26

## 2024-10-29 RX ADMIN — VALPROIC ACID 1250 MG: 500 SOLUTION ORAL at 21:27

## 2024-10-29 RX ADMIN — ACETAMINOPHEN 975 MG: 325 TABLET, FILM COATED ORAL at 21:35

## 2024-10-29 RX ADMIN — LORAZEPAM 0.5 MG: 0.5 TABLET ORAL at 08:23

## 2024-10-29 RX ADMIN — FAMOTIDINE 20 MG: 20 TABLET ORAL at 17:05

## 2024-10-29 RX ADMIN — PRAZOSIN HYDROCHLORIDE 3 MG: 1 CAPSULE ORAL at 21:25

## 2024-10-29 RX ADMIN — FAMOTIDINE 20 MG: 20 TABLET ORAL at 08:23

## 2024-10-29 RX ADMIN — CARVEDILOL 6.25 MG: 6.25 TABLET, FILM COATED ORAL at 16:33

## 2024-10-29 RX ADMIN — Medication 15 ML: at 08:23

## 2024-10-29 RX ADMIN — FLUTICASONE FUROATE 1 PUFF: 100 POWDER RESPIRATORY (INHALATION) at 08:23

## 2024-10-29 RX ADMIN — CLOZAPINE 900 MG: 200 TABLET ORAL at 21:25

## 2024-10-29 NOTE — PLAN OF CARE
Problem: Prexisting or High Potential for Compromised Skin Integrity  Goal: Skin integrity is maintained or improved  Description: INTERVENTIONS:  - Identify patients at risk for skin breakdown  - Assess and monitor skin integrity  - Assess and monitor nutrition and hydration status  - Monitor labs   - Assess for incontinence   - Turn and reposition patient  - Assist with mobility/ambulation  - Relieve pressure over bony prominences  - Avoid friction and shearing  - Provide appropriate hygiene as needed including keeping skin clean and dry  - Evaluate need for skin moisturizer/barrier cream  - Collaborate with interdisciplinary team   - Patient/family teaching  - Consider wound care consult   Outcome: Progressing     Problem: Alteration in Thoughts and Perception  Goal: Treatment Goal: Gain control of psychotic behaviors/thinking, reduce/eliminate presenting symptoms and demonstrate improved reality functioning upon discharge  Outcome: Progressing  Goal: Verbalize thoughts and feelings  Description: Interventions:  - Promote a nonjudgmental and trusting relationship with the patient through active listening and therapeutic communication  - Assess patient's level of functioning, behavior and potential for risk  - Engage patient in 1 on 1 interactions  - Encourage patient to express fears, feelings, frustrations, and discuss symptoms    - [place] patient to reality, help patient recognize reality-based thinking   - Administer medications as ordered and assess for potential side effects  - Provide the patient education related to the signs and symptoms of the illness and desired effects of prescribed medications  Outcome: Progressing  Goal: Refrain from acting on delusional thinking/internal stimuli  Description: Interventions:  - Monitor patient closely, per order   - Utilize least restrictive measures   - Set reasonable limits, give positive feedback for acceptable   - Administer medications as ordered and monitor  of potential side effects  Outcome: Not Progressing  Goal: Agree to be compliant with medication regime, as prescribed and report medication side effects  Description: Interventions:  - Offer appropriate PRN medication and supervise ingestion; conduct AIMS, as needed   Outcome: Progressing  Goal: Recognize dysfunctional thoughts, communicate reality-based thoughts at the time of discharge  Description: Interventions:  - Provide medication and psycho-education to assist patient in compliance and developing insight into his/her illness   Outcome: Progressing  Goal: Complete daily ADLs, including personal hygiene independently, as able  Description: Interventions:  - Observe, teach, and assist patient with ADLS  - Monitor and promote a balance of rest/activity, with adequate nutrition and elimination   Outcome: Not Progressing     Problem: Risk for Self Injury/Neglect  Goal: Treatment Goal: Remain safe during length of stay, learn and adopt new coping skills, and be free of self-injurious ideation, impulses and acts at the time of discharge  Outcome: Progressing  Goal: Verbalize thoughts and feelings  Description: Interventions:  - Assess and re-assess patient's lethality and potential for self-injury  - Engage patient in 1:1 interactions, daily, for a minimum of 15 minutes  - Encourage patient to express feelings, fears, frustrations, hopes  - Establish rapport/trust with patient   Outcome: Progressing  Goal: Refrain from harming self  Description: Interventions:  - Monitor patient closely, per order  - Develop a trusting relationship  - Supervise medication ingestion, monitor effects and side effects   Outcome: Progressing  Goal: Recognize maladaptive responses and adopt new coping mechanisms  Outcome: Progressing     Problem: Depression  Goal: Treatment Goal: Demonstrate behavioral control of depressive symptoms, verbalize feelings of improved mood/affect, and adopt new coping skills prior to discharge  Outcome:  Progressing     Problem: Anxiety  Goal: Anxiety is at manageable level  Description: Interventions:  - Assess and monitor patient's anxiety level.   - Monitor for signs and symptoms (heart palpitations, chest pain, shortness of breath, headaches, nausea, feeling jumpy, restlessness, irritable, apprehensive).   - Collaborate with interdisciplinary team and initiate plan and interventions as ordered.  - [place] patient to unit/surroundings  - Explain treatment plan  - Encourage participation in care  - Encourage verbalization of concerns/fears  - Identify coping mechanisms  - Assist in developing anxiety-reducing skills  - Administer/offer alternative therapies  - Limit or eliminate stimulants  Outcome: Progressing

## 2024-10-29 NOTE — NURSING NOTE
Patient mostly withdrawn to room listening to music. Patient endorses anxiety and little depression. Denies SI/HIAH/VH. VSS. Patient showered today. Denies unmet needs at this time. Continual safety checks ongoing

## 2024-10-29 NOTE — NURSING NOTE
Upon reassessment patient reports that tylenol 975 mg was moderately effective. Patient states pain is 5/10.

## 2024-10-29 NOTE — PROGRESS NOTES
10/29/24 0742   Team Meeting   Meeting Type Daily Rounds   Initial Conference Date 10/29/24   Next Conference Date 10/30/24   Team Members Present   Team Members Present Physician;Nurse;;   Physician Team Member Dr Banuelos, JAIR Goldman   Nursing Team Member Clarissa   Care Management Team Member Anum   Social Work Team Member Ayse   Patient/Family Present   Patient Present No   Patient's Family Present No     Discharge pending CRR placement, withdrawn to room, reports anx/dep, Clozaril level 650, increase Clozaril to 900, d/c Risperdal.

## 2024-10-29 NOTE — ASSESSMENT & PLAN NOTE
Continue Depakote to 1250 mg nightly  Most recent VPA level 54 on 10/12/24  CMP on 10/12/24 reviewed, WNL  Increasing Clozaril to 900 mg nightly for schizoaffective disorder  Clozaril monitoring:  CRP, CBC/diff, CK QMon for monitoring  VSS, without evidence of tachycardia  Most recent ECG reviewed 9/9/24 - normal ECG, NSR  Clozaril level 10/18 was 650, last ANC on 10/28 was 3.61; Clozaril level 660 on 10/28/24, Clozaril increased to 900 mg PO at HS   Obtain Clozaril level 11/04/2024  Senna to prevent Clozaril induced constipation  Discontinued-Risperdal to 1 mg qhs for ongoing psychosis 10/28/24  Continue Ativan 0.5 mg twice daily for anxiety   Continue melatonin 3 mg nightly for insomnia  Increase prazosin 3 mg nightly for PTSD associated nightmares   Continue trazodone 50 mg nightly for insomnia  Discharge disposition: CRR

## 2024-10-29 NOTE — TREATMENT TEAM
10/28/24 2128   Pain Assessment   Pain Assessment Tool 0-10   Pain Score 9   Pain Location/Orientation Orientation: Bilateral;Location: Leg   Pain Onset/Description Onset: Ongoing   Patient's Stated Pain Goal No pain   Hospital Pain Intervention(s) Medication (See MAR)     Patient is c/o bilateral leg pain. Prn tylenol 975 mg was administered at 2128. Will reassess for effectiveness.

## 2024-10-29 NOTE — NURSING NOTE
Patient is calm and cooperative with care. Patient endorses anxiety and depression. Patient is withdrawn to room during shift. Medication compliant. Patient denies any needs at this time. Safety checks ongoing.

## 2024-10-29 NOTE — PROGRESS NOTES
Progress Note - Behavioral Health   Name: Meli Nowak 59 y.o. female I MRN: [id number]  Unit/Bed#: [facility] 651-02 I Date of Admission: 7/23/2024   Date of Service: 10/29/2024 I Hospital Day: 98     Assessment & Plan  Schizoaffective disorder, bipolar type (HCC)  Continue Depakote to 1250 mg nightly  Most recent VPA level 54 on 10/12/24  CMP on 10/12/24 reviewed, WNL  Increasing Clozaril to 900 mg nightly for schizoaffective disorder  Clozaril monitoring:  CRP, CBC/diff, CK QMon for monitoring  VSS, without evidence of tachycardia  Most recent ECG reviewed 9/9/24 - normal ECG, NSR  Clozaril level 10/18 was 650, last ANC on 10/28 was 3.61; Clozaril level 660 on 10/28/24, Clozaril increased to 900 mg PO at HS   Obtain Clozaril level 11/04/2024  Senna to prevent Clozaril induced constipation  Discontinued-Risperdal to 1 mg qhs for ongoing psychosis 10/28/24  Continue Ativan 0.5 mg twice daily for anxiety   Continue melatonin 3 mg nightly for insomnia  Increase prazosin 3 mg nightly for PTSD associated nightmares   Continue trazodone 50 mg nightly for insomnia  Discharge disposition: CRR          Plan   Progress Toward Goals:   Meli is progressing towards goals of inpatient psychiatric treatment by continued medication compliance and is attending therapeutic modalities on the milieu. However, the patient continues to require inpatient psychiatric hospitalization for continued medication management and titration to optimize symptom reduction, improve sleep hygiene, and demonstrate adequate self-care.    Recommended Treatment: Treatment plan and medication changes discussed and per the attending physician the plan is:    1.Continue with group therapy, milieu therapy and occupational therapy  2.Behavioral Health checks every 7 minutes  3.Continue frequent safety checks and vitals per unit protocol  4.Continue with SLIM medical management as indicated  5.Continue with current medication regimen  6.Will review labs in the  a.m.  7.Disposition Planning: Discharge planning and efforts remain ongoing    Behavioral Health Medications: all current active meds have been reviewed.  Current Facility-Administered Medications   Medication Dose Route Frequency Provider Last Rate    acetaminophen  650 mg Oral Q4H PRN Marie Ziegler, CRNP      acetaminophen  650 mg Oral Q4H PRN Marie Ziegler, CRNP      acetaminophen  975 mg Oral Q6H PRN Marie Ziegler, CHRISTOPHERNP      aluminum-magnesium hydroxide-simethicone  30 mL Oral Q4H PRN Parris Bolanos, JOSE ELIAS      Artificial Tears  1 drop Both Eyes Q6H PRN Dereje Banuelos MD      atorvastatin  10 mg Oral Daily Marie Ziegler, CRNP      bisacodyl  10 mg Oral Daily PRN Kristen Logan, CRNP      bisacodyl  10 mg Rectal Daily PRN Marie Ziegler, CHRISTOPHERNP      carvedilol  6.25 mg Oral BID With Meals Marie Ziegler, JOSE ELIAS      cloZAPine  900 mg Oral HS Marie Ziegler, CRNP      cyanocobalamin  1,000 mcg Oral Daily Marie Ziegler, CRNP      famotidine  20 mg Oral BID Shan Fitzgerald DO      fluticasone  1 puff Inhalation Daily Marie Ziegler, JOSE ELIAS      hydrOXYzine HCL  25 mg Oral Q6H PRN Max 4/day Marie Ziegler, CRNP      hydrOXYzine HCL  50 mg Oral Q6H PRN Max 4/day Marie Ziegler, JOSE ELIAS      ipratropium-albuterol  3 mL Nebulization Q4H PRN Darin Lawton MD      LORazepam  0.5 mg Oral BID Marie Ziegler, JOSE ELIAS      Or    LORazepam  0.5 mg Intramuscular BID Marie Ziegler, JOSE ELIAS      LORazepam  1 mg Intramuscular Q6H PRN Max 3/day Marie Ziegler, JOSE ELIAS      LORazepam  1 mg Oral Q6H PRN Max 3/day Marie Ziegler, JOSE ELIAS      melatonin  3 mg Oral HS Marie Ziegler, CRNP      mirtazapine  7.5 mg Oral HS PRN Marie Ziegler, JOSE ELIAS      Multivitamin  15 mL Oral Daily Marie Ziegler, JOSE ELIAS      nicotine  1 patch Transdermal Daily Marie Ziegler, CHRISTOPHERNP      OLANZapine  2.5 mg Oral Q6H PRN Dereje Banuelos MD      Or    OLANZapine  2.5 mg Intramuscular Q6H PRN Dereje Banuelos MD      OLANZapine  5 mg Oral Q6H  PRN Dereje Banuelos MD      Or    OLANZapine  5 mg Intramuscular Q6H PRN Dereje Banuelos MD      ondansetron  4 mg Oral Q6H PRN Darin Lawton MD      polyethylene glycol  17 g Oral Daily Kristenabdoulaye TomasJOSE ELIAS Weller      polyethylene glycol  17 g Oral Daily PRN JOSE ELIAS Figueroa      prazosin  2 mg Oral HS JOSE ELIAS Figueroa      senna-docusate sodium  2 tablet Oral HS Rehana Borrego PA-C      traZODone  50 mg Oral HS JOSE ELIAS Figueroa      valproic acid  1,250 mg Oral HS JOSE ELIAS Figueroa         Risks / Benefits of Treatment:  Risks, benefits, and possible side effects of medications explained to patient and patient verbalizes understanding and agreement for treatment.  Discussed risks and benefits of Electroconvulsive Treatment with patient including risks of anesthesia and memory loss. Meli verbalized understanding and agreed for ECT.       Subjective    Patient was seen today for continuation of care, records reviewed and patient was discussed with the morning case review team.    Meli was seen today for psychiatric follow-up.  On assessment today, Meli was calm guarded and cooperative with interview. eMli was laying on her bed when approached. More withdrawn to her room this morning, visible on the unit intermittently. Meli endorses ongoing depression and anxiety. Complaint of bilateral leg pain rated 2/10. Encourage to ask for Tylenol as needed for pain. Reports having intermittent episodes of increased anxiety and SOB in the middle of the night after experiencing nightmares. Educated patient on the use of antianxiety medications as needed and the use of call bell. Patient appears internally preoccupied at times during conversation. Denies SI/HI/AVH at the time. Clozaril level 10/28/. Increase Clozaril to 900 mg nightly today. ANC 3.61 10/28/24. Clozaril level due Monday 11/4/24 prior to Clozaril night time dose. Patient made aware of medication uptitration change.Tentative  discharge pending CRR placement.     Meli denies acute suicidal/self-harm ideation/intent/plan upon direct inquiry at this time. Impulse control is intact. Meli remains adherent to her current psychotropic medication regimen and denies any side effects from medications, as well as none noted on exam.    Psychiatric Review of Systems:  Behavior over the last 24 hours:  improved.   Sleep: Good  Appetite: Good  Medication side effects: None  ROS: no complaints, denies shortness of breath or chest pain and all other systems are negative for acute changes        Objective    Vitals:  Vitals:    10/29/24 0733   BP: 156/74   Pulse: 81   Resp: 15   Temp: 97.9 °F (36.6 °C)   SpO2: 96%       Laboratory Results:  I have personally reviewed all pertinent laboratory/tests results.    Mental Status Evaluation:  Appearance:  disheveled, marginal hygiene   Behavior:  bizarre, guarded   Speech:  normal rate and volume   Mood:  slightly more anxious, less depressed   Affect:  constricted   Thought Process:  illogical, perseverative, concrete   Thought Content:  Taoism preoccupation, paranoid ideation   Perceptual Disturbances: appears preoccupied, denies when asked, but talks to self at times   Risk Potential: Suicidal ideation - None  Homicidal ideation - None  Potential for aggression - No   Memory:  recent and remote memory grossly intact   Sensorium  person, place, and time/date      Consciousness:  alert and awake   Attention: attention span and concentration are age appropriate   Insight:  limited   Judgment: limited   Gait/Station: normal gait/station   Motor Activity: no abnormal movements       Counseling / Coordination of Care:  Patient's progress reviewed with nursing staff.  Medications, treatment progress and treatment plan reviewed with patient.  Supportive counseling provided to the patient.    This note was completed in part utilizing Dragon dictation Software. Grammatical, translation, syntax errors, random word  insertions, spelling mistakes, and incomplete sentences may be an occasional consequence of this system secondary to software limitations with voice recognition, ambient noise, and hardware issues. If you have any questions or concerns about the content, text, or information contained within the body of this dictation, please contact the provider for clarification

## 2024-10-30 PROCEDURE — 99232 SBSQ HOSP IP/OBS MODERATE 35: CPT | Performed by: STUDENT IN AN ORGANIZED HEALTH CARE EDUCATION/TRAINING PROGRAM

## 2024-10-30 RX ORDER — IBUPROFEN 600 MG/1
600 TABLET, FILM COATED ORAL ONCE
Status: DISCONTINUED | OUTPATIENT
Start: 2024-10-30 | End: 2024-11-14

## 2024-10-30 RX ORDER — IBUPROFEN 600 MG/1
600 TABLET, FILM COATED ORAL ONCE
Status: COMPLETED | OUTPATIENT
Start: 2024-10-30 | End: 2024-10-30

## 2024-10-30 RX ADMIN — MELATONIN TAB 3 MG 3 MG: 3 TAB at 21:14

## 2024-10-30 RX ADMIN — CARVEDILOL 6.25 MG: 6.25 TABLET, FILM COATED ORAL at 08:07

## 2024-10-30 RX ADMIN — VALPROIC ACID 1250 MG: 500 SOLUTION ORAL at 21:14

## 2024-10-30 RX ADMIN — CLOZAPINE 900 MG: 200 TABLET ORAL at 21:14

## 2024-10-30 RX ADMIN — SENNOSIDES AND DOCUSATE SODIUM 2 TABLET: 8.6; 5 TABLET ORAL at 21:14

## 2024-10-30 RX ADMIN — POLYETHYLENE GLYCOL 3350 17 G: 17 POWDER, FOR SOLUTION ORAL at 08:08

## 2024-10-30 RX ADMIN — TRAZODONE HYDROCHLORIDE 50 MG: 50 TABLET ORAL at 21:14

## 2024-10-30 RX ADMIN — FAMOTIDINE 20 MG: 20 TABLET ORAL at 08:07

## 2024-10-30 RX ADMIN — PRAZOSIN HYDROCHLORIDE 3 MG: 1 CAPSULE ORAL at 21:14

## 2024-10-30 RX ADMIN — FAMOTIDINE 20 MG: 20 TABLET ORAL at 17:30

## 2024-10-30 RX ADMIN — LORAZEPAM 0.5 MG: 0.5 TABLET ORAL at 17:30

## 2024-10-30 RX ADMIN — IBUPROFEN 600 MG: 600 TABLET, FILM COATED ORAL at 19:27

## 2024-10-30 RX ADMIN — LORAZEPAM 0.5 MG: 0.5 TABLET ORAL at 08:07

## 2024-10-30 RX ADMIN — Medication 15 ML: at 08:07

## 2024-10-30 RX ADMIN — CARVEDILOL 6.25 MG: 6.25 TABLET, FILM COATED ORAL at 16:35

## 2024-10-30 RX ADMIN — ATORVASTATIN CALCIUM 10 MG: 10 TABLET, FILM COATED ORAL at 08:07

## 2024-10-30 RX ADMIN — CYANOCOBALAMIN TAB 1000 MCG 1000 MCG: 1000 TAB at 08:07

## 2024-10-30 NOTE — NURSING NOTE
Patient mostly withdrawn to room. Patient denies SI/HI/AH/VH endorses anxiety. Patient is med compliant besides refusing inhaler this am. VSS. Continual safety checks ongoing

## 2024-10-30 NOTE — PLAN OF CARE
Problem: Prexisting or High Potential for Compromised Skin Integrity  Goal: Skin integrity is maintained or improved  Description: INTERVENTIONS:  - Identify patients at risk for skin breakdown  - Assess and monitor skin integrity  - Assess and monitor nutrition and hydration status  - Monitor labs   - Assess for incontinence   - Turn and reposition patient  - Assist with mobility/ambulation  - Relieve pressure over bony prominences  - Avoid friction and shearing  - Provide appropriate hygiene as needed including keeping skin clean and dry  - Evaluate need for skin moisturizer/barrier cream  - Collaborate with interdisciplinary team   - Patient/family teaching  - Consider wound care consult   Outcome: Progressing     Problem: Alteration in Thoughts and Perception  Goal: Refrain from acting on delusional thinking/internal stimuli  Description: Interventions:  - Monitor patient closely, per order   - Utilize least restrictive measures   - Set reasonable limits, give positive feedback for acceptable   - Administer medications as ordered and monitor of potential side effects  Outcome: Progressing  Goal: Complete daily ADLs, including personal hygiene independently, as able  Description: Interventions:  - Observe, teach, and assist patient with ADLS  - Monitor and promote a balance of rest/activity, with adequate nutrition and elimination   Outcome: Progressing     Problem: Ineffective Coping  Goal: Participates in unit activities  Description: Interventions:  - Provide therapeutic environment   - Provide required programming   - Redirect inappropriate behaviors   Outcome: Progressing  Goal: Free from restraint events  Description: - Utilize least restrictive measures   - Provide behavioral interventions   - Redirect inappropriate behaviors   Outcome: Progressing     Problem: Risk for Self Injury/Neglect  Goal: Refrain from harming self  Description: Interventions:  - Monitor patient closely, per order  - Develop a  trusting relationship  - Supervise medication ingestion, monitor effects and side effects   Outcome: Progressing     Problem: Depression  Goal: Treatment Goal: Demonstrate behavioral control of depressive symptoms, verbalize feelings of improved mood/affect, and adopt new coping skills prior to discharge  Outcome: Progressing  Goal: Refrain from self-neglect  Outcome: Progressing     Problem: Anxiety  Goal: Anxiety is at manageable level  Description: Interventions:  - Assess and monitor patient's anxiety level.   - Monitor for signs and symptoms (heart palpitations, chest pain, shortness of breath, headaches, nausea, feeling jumpy, restlessness, irritable, apprehensive).   - Collaborate with interdisciplinary team and initiate plan and interventions as ordered.  - [place] patient to unit/surroundings  - Explain treatment plan  - Encourage participation in care  - Encourage verbalization of concerns/fears  - Identify coping mechanisms  - Assist in developing anxiety-reducing skills  - Administer/offer alternative therapies  - Limit or eliminate stimulants  Outcome: Progressing     Problem: Potential for Falls  Goal: Patient will remain free of falls  Description: INTERVENTIONS:  - Educate patient on patient safety including physical limitations  - Instruct patient to call for assistance with activity   - Consult OT/PT to assist with strengthening/mobility   - Keep Call bell within reach  - Keep bed low and locked with side rails adjusted as appropriate  - Keep care items and personal belongings within reach  - Initiate and maintain comfort rounds  - Offer Toileting every 2 Hours, in advance of need  - Initiate/Maintain bed and chair alarm  - Obtain necessary fall risk management equipment: walker, wheelchair  - Apply yellow socks and bracelet for high fall risk patients  - Patient moved to room near nurses station  Outcome: Progressing     Problem: Nutrition/Hydration-ADULT  Goal: Nutrient/Hydration intake  appropriate for improving, restoring or maintaining nutritional needs  Description: Monitor and assess patient's nutrition/hydration status for malnutrition. Collaborate with interdisciplinary team and initiate plan and interventions as ordered.  Monitor patient's weight and dietary intake as ordered or per policy. Utilize nutrition screening tool and intervene as necessary. Determine patient's food preferences and provide high-protein, high-caloric foods as appropriate.     INTERVENTIONS:  - Monitor oral intake, urinary output, labs, and treatment plans  - Assess nutrition and hydration status and recommend course of action  - Evaluate amount of meals eaten  - Assist patient with eating if necessary   - Allow adequate time for meals  - Recommend/ encourage appropriate diets, oral nutritional supplements, and vitamin/mineral supplements  - Order, calculate, and assess calorie counts as needed  - Recommend, monitor, and adjust tube feedings and TPN/PPN based on assessed needs  - Assess need for intravenous fluids  - Provide specific nutrition/hydration education as appropriate  - Include patient/family/caregiver in decisions related to nutrition  Outcome: Progressing

## 2024-10-30 NOTE — NURSING NOTE
Meli remains isolative to her room. Meli denies all psych s/s but remains paranoid and guarded. Meli is medication compliant and mouth checks continue to be completed. Meli requires frequent reassurance. Meli does use music as a + coping mechanism. Will continue to monitor and support during treatment.

## 2024-10-30 NOTE — PROGRESS NOTES
Progress Note - Behavioral Health   Name: Meli Nowak 59 y.o. female I MRN: [id number]  Unit/Bed#: [facility] 651-02 I Date of Admission: 7/23/2024   Date of Service: 10/30/2024 I Hospital Day: 99     Assessment & Plan  Schizoaffective disorder, bipolar type (HCC)  Continue Depakote to 1250 mg nightly  Most recent VPA level 54 on 10/12/24  CMP on 10/12/24 reviewed, WNL  Increasing Clozaril to 900 mg nightly for schizoaffective disorder  Clozaril monitoring:  CRP, CBC/diff, CK QMon for monitoring  VSS, without evidence of tachycardia  Most recent ECG reviewed 9/9/24 - normal ECG, NSR  Clozaril level 10/18 was 650, last ANC on 10/28 was 3.61; Clozaril level 660 on 10/28/24, Clozaril increased to 900 mg PO at HS   Obtain Clozaril level 11/04/2024  Senna to prevent Clozaril induced constipation  Discontinued-Risperdal to 1 mg qhs for ongoing psychosis 10/28/24  Continue Ativan 0.5 mg twice daily for anxiety   Continue melatonin 3 mg nightly for insomnia  Increase prazosin 3 mg nightly for PTSD associated nightmares   Continue trazodone 50 mg nightly for insomnia  Discharge disposition: CRR          Plan   Progress Toward Goals:   Meli is progressing towards goals of inpatient psychiatric treatment by continued medication compliance and is attending therapeutic modalities on the milieu. However, the patient continues to require inpatient psychiatric hospitalization for continued medication management and titration to optimize symptom reduction, improve sleep hygiene, and demonstrate adequate self-care.    Recommended Treatment: Treatment plan and medication changes discussed and per the attending physician the plan is:    1.Continue with group therapy, milieu therapy and occupational therapy  2.Behavioral Health checks every 7 minutes  3.Continue frequent safety checks and vitals per unit protocol  4.Continue with SLIM medical management as indicated  5.Continue with current medication regimen  6.Will review labs in the  a.m.  7.Disposition Planning: Discharge planning and efforts remain ongoing    Behavioral Health Medications: all current active meds have been reviewed and continue current psychiatric medications.  Current Facility-Administered Medications   Medication Dose Route Frequency Provider Last Rate    acetaminophen  650 mg Oral Q4H PRN Marie Ziegler, CHRISTOPHERNP      acetaminophen  650 mg Oral Q4H PRN Marie Ziegler, CRNP      acetaminophen  975 mg Oral Q6H PRN Marie Ziegler, JOSE ELIAS      aluminum-magnesium hydroxide-simethicone  30 mL Oral Q4H PRN Parris Bolanos, JOSE ELIAS      Artificial Tears  1 drop Both Eyes Q6H PRN Dereje Banuelos MD      atorvastatin  10 mg Oral Daily Marie Ziegler, CHRISTOPHERNP      bisacodyl  10 mg Oral Daily PRN Kristen Logan, JOSE ELIAS      bisacodyl  10 mg Rectal Daily PRN Marie Ziegler, JOSE ELIAS      carvedilol  6.25 mg Oral BID With Meals Marie Ziegler, JOSE ELIAS      cloZAPine  900 mg Oral HS Marie Ziegler, JOSE ELIAS      cyanocobalamin  1,000 mcg Oral Daily Marie Ziegler, JOSE ELIAS      famotidine  20 mg Oral BID Shan Fitzgerald,       fluticasone  1 puff Inhalation Daily Marie Ziegler, JOSE ELIAS      hydrOXYzine HCL  25 mg Oral Q6H PRN Max 4/day Marie Ziegler, CRVALE      hydrOXYzine HCL  50 mg Oral Q6H PRN Max 4/day Marie Ziegler, JOSE ELIAS      ipratropium-albuterol  3 mL Nebulization Q4H PRN Darin Lawton MD      LORazepam  0.5 mg Oral BID Marie Ziegler, JOSE ELIAS      Or    LORazepam  0.5 mg Intramuscular BID Marie Ziegler, JOSE ELIAS      LORazepam  1 mg Intramuscular Q6H PRN Max 3/day Marie Ziegler, JOSE ELIAS      LORazepam  1 mg Oral Q6H PRN Max 3/day Marie Ziegler, JOSE ELIAS      melatonin  3 mg Oral HS Marie Ziegler, JOSE ELIAS      mirtazapine  7.5 mg Oral HS PRN JOSE ELIAS Figueroa      Multivitamin  15 mL Oral Daily Marie Ziegler, JOSE ELIAS      nicotine  1 patch Transdermal Daily Marie Ziegler, JOSE ELIAS      OLANZapine  2.5 mg Oral Q6H PRN Dereje Banuelos MD      Or    OLANZapine  2.5 mg Intramuscular Q6H PRN Dereje  MD Vin      OLANZapine  5 mg Oral Q6H PRN Dereje Banuelos MD      Or    OLANZapine  5 mg Intramuscular Q6H PRN Dereje Banuelos MD      ondansetron  4 mg Oral Q6H PRN Darin Lawton MD      polyethylene glycol  17 g Oral Daily Kristen Ludwig Doreen, JOSE ELIAS      polyethylene glycol  17 g Oral Daily PRN JOSE ELIAS Figueroa      prazosin  3 mg Oral HS JOSE ELIAS Figueroa      senna-docusate sodium  2 tablet Oral HS Rehana Borrego PA-C      traZODone  50 mg Oral HS JOSE ELIAS Figueroa      valproic acid  1,250 mg Oral HS JOSE ELIAS Figueroa         Risks / Benefits of Treatment:  Risks, benefits, and possible side effects of medications explained to patient and patient verbalizes understanding and agreement for treatment.       Subjective    Patient was seen today for continuation of care, records reviewed and patient was discussed with the morning case review team.    Meli was seen today for psychiatric follow-up.  On assessment today, Meli was seen wandering in her room. C/O generalized body pain, she states that Tylenol has been moderately effective in managing symptoms. Will order Ibuprofen 600 mg to assist with symptoms. Auditory hallucinations reported as decreased, patient continues to use the radio and distraction to assist with symptoms.  No Judaism preoccupation today, patient tolerating recent increase of Clozaril to 900 mg daily.  Weekly labs obtained q. Monday, we will also continue to monitor clozapine levels with last obtained on 10/28/2024 at 660.  Bowel movements are regular, no abdominal distention or constipation reported.  Discharge pending CRR placement.    Meli denies acute suicidal/self-harm ideation/intent/plan upon direct inquiry at this time.  [place] remains behaviorally appropriate, no agitation or aggression noted on exam or in report.  [place] also denies HI/AH/VH, and does not appear overtly manic.  No overt delusions or paranoia are verbalized. Impulse control is intact.   Meli remains adherent to her current psychotropic medication regimen and denies any side effects from medications, as well as none noted on exam.    Psychiatric Review of Systems:  Behavior over the last 24 hours:  unchanged.   Sleep: good  Appetite: good  Medication side effects: none  ROS: no complaints, denies shortness of breath or chest pain and all other systems are negative for acute changes        Objective    Vitals:  Vitals:    10/30/24 0740   BP: 115/74   Pulse: 75   Resp: 19   Temp: 97.8 °F (36.6 °C)   SpO2: 95%       Laboratory Results:  I have personally reviewed all pertinent laboratory/tests results.  Most Recent Labs:   Lab Results   Component Value Date    WBC 8.16 10/28/2024    RBC 4.36 10/28/2024    HGB 13.5 10/28/2024    HCT 43.0 10/28/2024     10/28/2024    RDW 13.7 10/28/2024    NEUTROABS 3.61 10/28/2024    SODIUM 138 10/12/2024    K 4.1 10/12/2024     10/12/2024    CO2 29 10/12/2024    BUN 18 10/12/2024    CREATININE 0.97 10/12/2024    GLUC 146 (H) 10/12/2024    GLUF 98 08/10/2024    CALCIUM 9.2 10/12/2024    AST 12 (L) 10/12/2024    ALT 12 10/12/2024    ALKPHOS 84 10/12/2024    TP 6.7 10/12/2024    ALB 3.9 10/12/2024    TBILI 0.26 10/12/2024    VALPROICTOT 54 10/12/2024    AMMONIA 32 07/03/2024    UJO2APFLYNXF 2.000 07/24/2024    HGBA1C 6.4 (H) 05/03/2024     05/03/2024       Mental Status Evaluation:  Appearance:  disheveled, marginal hygiene   Behavior:  cooperative, calm, bizarre, guarded   Speech:  normal rate and volume   Mood:  less anxious, less depressed, less irritable   Affect:  constricted   Thought Process:  illogical, perseverative, concrete   Thought Content:  Christianity preoccupation, paranoid ideation   Perceptual Disturbances: auditory hallucinations still present, but less frequent   Risk Potential: Suicidal ideation - None  Homicidal ideation - None  Potential for aggression - No   Memory:  recent and remote memory grossly intact   Sensorium  person,  place, and time/date      Consciousness:  alert and awake   Attention: attention span and concentration are age appropriate   Insight:  limited   Judgment: limited   Gait/Station: normal gait/station   Motor Activity: no abnormal movements       Counseling / Coordination of Care:  Patient's progress reviewed with nursing staff.  Medications, treatment progress and treatment plan reviewed with patient.  Supportive counseling provided to the patient.    This note was completed in part utilizing Dragon dictation Software. Grammatical, translation, syntax errors, random word insertions, spelling mistakes, and incomplete sentences may be an occasional consequence of this system secondary to software limitations with voice recognition, ambient noise, and hardware issues. If you have any questions or concerns about the content, text, or information contained within the body of this dictation, please contact the provider for clarification   No

## 2024-10-30 NOTE — TREATMENT TEAM
Pt attended 1 group.  Pt cooperative and displayed positive thinking patterns.      10/30/24 0900 10/30/24 1000 10/30/24 1100   Activity/Group Checklist   Group Exercise Other (Comment)  (Community meeting: Self care action plan) Life Skills   Attendance Did not attend Attended Did not attend   Attendance Duration (min)  --  46-60  --    Interactions  --  Interacted appropriately  --    Affect/Mood  --  Bright;Appropriate  --    Goals Achieved  --  Identified feelings;Identified triggers;Identified relapse prevention strategies;Able to listen to others;Able to engage in interactions;Able to self-disclose;Able to reflect/comment on own behavior;Discussed self-esteem issues;Discussed coping strategies  --       10/30/24 1330   Activity/Group Checklist   Group Other (Comment)  (Boundaries and Communication)   Attendance Did not attend   Attendance Duration (min)  --    Interactions  --    Affect/Mood  --    Goals Achieved  --

## 2024-10-30 NOTE — PROGRESS NOTES
10/30/24 0717   Team Meeting   Meeting Type Daily Rounds   Initial Conference Date 10/30/24   Next Conference Date 10/31/24   Team Members Present   Team Members Present Physician;Nurse;;   Physician Team Member Dr Banuelos, JAIR Goldman   Nursing Team Member Clarissa   Care Management Team Member Anum   Social Work Team Member Ayse   Patient/Family Present   Patient Present No   Patient's Family Present No     Discharge pending CRR, mostly withdrawn to room, deny s/s but remains paranoid and guarded, compliant with mouth checks, requires frequent reassurance, tylenol for general body pain effective, increased Clozaril, d/c Risperdal, can increase prazocin.

## 2024-10-30 NOTE — ASSESSMENT & PLAN NOTE
Continue Depakote to 1250 mg nightly  Most recent VPA level 54 on 10/12/24  CMP on 10/12/24 reviewed, WNL  Increasing Clozaril to 900 mg nightly for schizoaffective disorder  Clozaril monitoring:  CRP, CBC/diff, CK QMon for monitoring  VSS, without evidence of tachycardia  Most recent ECG reviewed 9/9/24 - normal ECG, NSR  Clozaril level 10/18 was 650, last ANC on 10/28 was 3.61; Clozaril level 660 on 10/28/24, Clozaril increased to 900 mg PO at HS   Obtain Clozaril level 11/04/2024  Senna to prevent Clozaril induced constipation  Discontinued-Risperdal to 1 mg qhs for ongoing psychosis 10/28/24  Continue Ativan 0.5 mg twice daily for anxiety   Continue melatonin 3 mg nightly for insomnia  Continue prazosin 3 mg nightly for PTSD associated nightmares; doses to be adjusted as indicated   Continue trazodone 50 mg nightly for insomnia  Discharge disposition: CRR

## 2024-10-30 NOTE — TREATMENT TEAM
10/29/24 5407   Pain Assessment   Pain Assessment Tool 0-10   Pain Score 8   Pain Location/Orientation Location: Generalized   Pain Onset/Description Onset: Ongoing   Hospital Pain Intervention(s) Medication (See MAR)     Medicated for general body pain with Tylenol as per order.

## 2024-10-31 PROCEDURE — 99232 SBSQ HOSP IP/OBS MODERATE 35: CPT | Performed by: STUDENT IN AN ORGANIZED HEALTH CARE EDUCATION/TRAINING PROGRAM

## 2024-10-31 RX ADMIN — CLOZAPINE 900 MG: 200 TABLET ORAL at 21:28

## 2024-10-31 RX ADMIN — Medication 15 ML: at 08:22

## 2024-10-31 RX ADMIN — VALPROIC ACID 1250 MG: 500 SOLUTION ORAL at 21:27

## 2024-10-31 RX ADMIN — MELATONIN TAB 3 MG 3 MG: 3 TAB at 21:28

## 2024-10-31 RX ADMIN — LORAZEPAM 0.5 MG: 0.5 TABLET ORAL at 08:21

## 2024-10-31 RX ADMIN — PRAZOSIN HYDROCHLORIDE 3 MG: 1 CAPSULE ORAL at 21:27

## 2024-10-31 RX ADMIN — SENNOSIDES AND DOCUSATE SODIUM 2 TABLET: 8.6; 5 TABLET ORAL at 21:28

## 2024-10-31 RX ADMIN — CYANOCOBALAMIN TAB 1000 MCG 1000 MCG: 1000 TAB at 08:21

## 2024-10-31 RX ADMIN — LORAZEPAM 0.5 MG: 0.5 TABLET ORAL at 17:25

## 2024-10-31 RX ADMIN — FAMOTIDINE 20 MG: 20 TABLET ORAL at 17:25

## 2024-10-31 RX ADMIN — FAMOTIDINE 20 MG: 20 TABLET ORAL at 08:21

## 2024-10-31 RX ADMIN — ATORVASTATIN CALCIUM 10 MG: 10 TABLET, FILM COATED ORAL at 08:21

## 2024-10-31 RX ADMIN — CARVEDILOL 6.25 MG: 6.25 TABLET, FILM COATED ORAL at 08:21

## 2024-10-31 RX ADMIN — CARVEDILOL 6.25 MG: 6.25 TABLET, FILM COATED ORAL at 17:25

## 2024-10-31 RX ADMIN — TRAZODONE HYDROCHLORIDE 50 MG: 50 TABLET ORAL at 21:28

## 2024-10-31 RX ADMIN — POLYETHYLENE GLYCOL 3350 17 G: 17 POWDER, FOR SOLUTION ORAL at 09:33

## 2024-10-31 NOTE — PROGRESS NOTES
10/31/24 0803   Team Meeting   Meeting Type Daily Rounds   Initial Conference Date 10/31/24   Next Conference Date 11/01/24   Team Members Present   Team Members Present Physician;Nurse;;   Physician Team Member Dr Banuelos, JAIR Goldman   Nursing Team Member Clarissa   Care Management Team Member Anum   Social Work Team Member Ayse   Patient/Family Present   Patient Present No   Patient's Family Present No     Discharge pending CRR, motrin for leg pain, reporting dep/anx, reporting hearing voices but not that bad, bright affect.

## 2024-10-31 NOTE — TREATMENT TEAM
10/30/24 1927   Pain Assessment   Pain Assessment Tool 0-10   Pain Score 7   Pain Location/Orientation Location: Leg   Pain Onset/Description Onset: Gradual   Effect of Pain on Daily Activities Walking   Patient's Stated Pain Goal No pain   Hospital Pain Intervention(s) Medication (See MAR)   Multiple Pain Sites No     Patient refused earlier dose of ibuprofen, then requested it again for leg pain. Ibuprofen 600 mg given.

## 2024-10-31 NOTE — PLAN OF CARE
Problem: Prexisting or High Potential for Compromised Skin Integrity  Goal: Skin integrity is maintained or improved  Description: INTERVENTIONS:  - Identify patients at risk for skin breakdown  - Assess and monitor skin integrity  - Assess and monitor nutrition and hydration status  - Monitor labs   - Assess for incontinence   - Turn and reposition patient  - Assist with mobility/ambulation  - Relieve pressure over bony prominences  - Avoid friction and shearing  - Provide appropriate hygiene as needed including keeping skin clean and dry  - Evaluate need for skin moisturizer/barrier cream  - Collaborate with interdisciplinary team   - Patient/family teaching  - Consider wound care consult   Outcome: Progressing     Problem: Alteration in Thoughts and Perception  Goal: Verbalize thoughts and feelings  Description: Interventions:  - Promote a nonjudgmental and trusting relationship with the patient through active listening and therapeutic communication  - Assess patient's level of functioning, behavior and potential for risk  - Engage patient in 1 on 1 interactions  - Encourage patient to express fears, feelings, frustrations, and discuss symptoms    - [place] patient to reality, help patient recognize reality-based thinking   - Administer medications as ordered and assess for potential side effects  - Provide the patient education related to the signs and symptoms of the illness and desired effects of prescribed medications  Outcome: Progressing  Goal: Refrain from acting on delusional thinking/internal stimuli  Description: Interventions:  - Monitor patient closely, per order   - Utilize least restrictive measures   - Set reasonable limits, give positive feedback for acceptable   - Administer medications as ordered and monitor of potential side effects  Outcome: Not Progressing  Goal: Agree to be compliant with medication regime, as prescribed and report medication side effects  Description: Interventions:  -  Offer appropriate PRN medication and supervise ingestion; conduct AIMS, as needed   Outcome: Progressing  Goal: Attend and participate in unit activities, including therapeutic, recreational, and educational groups  Description: Interventions:  -Encourage Visitation and family involvement in care  Outcome: Progressing  Goal: Recognize dysfunctional thoughts, communicate reality-based thoughts at the time of discharge  Description: Interventions:  - Provide medication and psycho-education to assist patient in compliance and developing insight into his/her illness   Outcome: Not Progressing  Goal: Complete daily ADLs, including personal hygiene independently, as able  Description: Interventions:  - Observe, teach, and assist patient with ADLS  - Monitor and promote a balance of rest/activity, with adequate nutrition and elimination   Outcome: Progressing     Problem: Ineffective Coping  Goal: Identifies ineffective coping skills  Outcome: Not Progressing  Goal: Demonstrates healthy coping skills  Outcome: Not Progressing  Goal: Participates in unit activities  Description: Interventions:  - Provide therapeutic environment   - Provide required programming   - Redirect inappropriate behaviors   Outcome: Progressing  Goal: Understands least restrictive measures  Description: Interventions:  - Utilize least restrictive behavior  Outcome: Progressing  Goal: Free from restraint events  Description: - Utilize least restrictive measures   - Provide behavioral interventions   - Redirect inappropriate behaviors   Outcome: Progressing     Problem: Risk for Self Injury/Neglect  Goal: Verbalize thoughts and feelings  Description: Interventions:  - Assess and re-assess patient's lethality and potential for self-injury  - Engage patient in 1:1 interactions, daily, for a minimum of 15 minutes  - Encourage patient to express feelings, fears, frustrations, hopes  - Establish rapport/trust with patient   Outcome: Progressing  Goal:  Refrain from harming self  Description: Interventions:  - Monitor patient closely, per order  - Develop a trusting relationship  - Supervise medication ingestion, monitor effects and side effects   Outcome: Progressing  Goal: Attend and participate in unit activities, including therapeutic, recreational, and educational groups  Description: Interventions:  - Provide therapeutic and educational activities daily, encourage attendance and participation, and document same in the medical record  - Obtain collateral information, encourage visitation and family involvement in care   Outcome: Progressing  Goal: Recognize maladaptive responses and adopt new coping mechanisms  Outcome: Not Progressing

## 2024-10-31 NOTE — TREATMENT TEAM
10/30/24 2027   Pain Assessment Post Intervention   Pain Assessment Tool Used: 0-10   Post Intervention Pain Score 3   Post Intervention Pain Location/Orientation Orientation: Bilateral;Location: Leg   Post Intervention POSS 1   Response to Interventions Effective     Patient verbalized decreased leg pain. PRN effective.

## 2024-10-31 NOTE — PROGRESS NOTES
Progress Note - Behavioral Health   Name: Meli Nowak 59 y.o. female I MRN: [id number]  Unit/Bed#: [facility] 651-02 I Date of Admission: 7/23/2024   Date of Service: 10/31/2024 I Hospital Day: 100     Assessment & Plan  Schizoaffective disorder, bipolar type (HCC)  Continue Depakote to 1250 mg nightly  Most recent VPA level 54 on 10/12/24  CMP on 10/12/24 reviewed, WNL  Increasing Clozaril to 900 mg nightly for schizoaffective disorder  Clozaril monitoring:  CRP, CBC/diff, CK QMon for monitoring  VSS, without evidence of tachycardia  Most recent ECG reviewed 9/9/24 - normal ECG, NSR  Clozaril level 10/18 was 650, last ANC on 10/28 was 3.61; Clozaril level 660 on 10/28/24, Clozaril increased to 900 mg PO at HS   Obtain Clozaril level 11/04/2024  Senna to prevent Clozaril induced constipation  Discontinued-Risperdal to 1 mg qhs for ongoing psychosis 10/28/24  Continue Ativan 0.5 mg twice daily for anxiety   Continue melatonin 3 mg nightly for insomnia  Continue prazosin 3 mg nightly for PTSD associated nightmares; doses to be adjusted as indicated   Continue trazodone 50 mg nightly for insomnia  Discharge disposition: CRR          Plan   Progress Toward Goals:   Meli is progressing towards goals of inpatient psychiatric treatment by continued medication compliance and is attending therapeutic modalities on the milieu. However, the patient continues to require inpatient psychiatric hospitalization for continued medication management and titration to optimize symptom reduction, improve sleep hygiene, and demonstrate adequate self-care.    Recommended Treatment: Treatment plan and medication changes discussed and per the attending physician the plan is:    1.Continue with group therapy, milieu therapy and occupational therapy  2.Behavioral Health checks every 7 minutes  3.Continue frequent safety checks and vitals per unit protocol  4.Continue with SLIM medical management as indicated  5.Continue with current medication  regimen  6.Will review labs in the a.m.  7.Disposition Planning: Discharge planning and efforts remain ongoing    Behavioral Health Medications: all current active meds have been reviewed and continue current psychiatric medications.  Current Facility-Administered Medications   Medication Dose Route Frequency Provider Last Rate    acetaminophen  650 mg Oral Q4H PRN Marie Ziegler, CHRISTOPHERNP      acetaminophen  650 mg Oral Q4H PRN Marie Ziegler, CRNP      acetaminophen  975 mg Oral Q6H PRN Marie Ziegler, JOSE ELIAS      aluminum-magnesium hydroxide-simethicone  30 mL Oral Q4H PRN Parris Bolanos, JOSE ELIAS      Artificial Tears  1 drop Both Eyes Q6H PRN Dereje Banuelos MD      atorvastatin  10 mg Oral Daily Marie Ziegler, JOSE ELIAS      bisacodyl  10 mg Oral Daily PRN Kristen Logan, JOSE ELIAS      bisacodyl  10 mg Rectal Daily PRN Marie Ziegler, JOSE ELIAS      carvedilol  6.25 mg Oral BID With Meals Marie Ziegler, CHRISTOPHERNP      cloZAPine  900 mg Oral HS Marie Ziegler, JOSE ELIAS      cyanocobalamin  1,000 mcg Oral Daily Marie Ziegler, CRVALE      famotidine  20 mg Oral BID Shan Fitzgerald, DO      fluticasone  1 puff Inhalation Daily Marie Ziegler, JOSE ELIAS      hydrOXYzine HCL  25 mg Oral Q6H PRN Max 4/day Marie Ziegler, JOSE ELIAS      hydrOXYzine HCL  50 mg Oral Q6H PRN Max 4/day Marie Ziegler, CHRISTOPHERNP      ibuprofen  600 mg Oral Once Marie Ziegler, JOSE ELIAS      ipratropium-albuterol  3 mL Nebulization Q4H PRN Darin Lawton MD      LORazepam  0.5 mg Oral BID JOSE ELIAS Figueroa      Or    LORazepam  0.5 mg Intramuscular BID Marie Ziegler, CHRISTOPHERNP      LORazepam  1 mg Intramuscular Q6H PRN Max 3/day Marie Ziegler, JOSE ELIAS      LORazepam  1 mg Oral Q6H PRN Max 3/day Marie Ziegler, JOSE ELIAS      melatonin  3 mg Oral HS Marie Ziegler, JOSE ELIAS      mirtazapine  7.5 mg Oral HS PRN Marie Ziegler, CHRISTOPHERNP      Multivitamin  15 mL Oral Daily Marie Ziegler, CHRISTOPHERNP      nicotine  1 patch Transdermal Daily Marie Ziegler, JOSE ELIAS      OLANZapine  2.5  "mg Oral Q6H PRN Dereje Banuelos MD      Or    OLANZapine  2.5 mg Intramuscular Q6H PRN Dereje Banuelos MD      OLANZapine  5 mg Oral Q6H PRN Dereje Banuelos MD      Or    OLANZapine  5 mg Intramuscular Q6H PRN Dereje Banuelos MD      ondansetron  4 mg Oral Q6H PRN Darin Lawton MD      polyethylene glycol  17 g Oral Daily JOSE ELIAS Ortega      polyethylene glycol  17 g Oral Daily PRN JOSE ELIAS Figueroa      prazosin  3 mg Oral HS JOSE ELIAS Figueroa      senna-docusate sodium  2 tablet Oral HS Rehana Borrego PA-C      traZODone  50 mg Oral HS JOSE ELIAS Figueroa      valproic acid  1,250 mg Oral HS JOSE ELIAS Figueroa         Risks / Benefits of Treatment:  Risks, benefits, and possible side effects of medications explained to patient and patient verbalizes understanding and agreement for treatment.       Subjective    Patient was seen today for continuation of care, records reviewed and patient was discussed with the morning case review team.    Meli was seen today for psychiatric follow-up.  On assessment today, Meli was seen lying in bed.  Less social today, patient states that she is okay but that she \"just wants to lay down \".  Leg pain verbalized, we will order one-time order of ibuprofen due to elevated GFR.  Clozaril to remain in 900 mg nightly, patient reports no adverse symptoms and is tolerating medication regimen well.  Weekly labs remain unchanged, we will obtain Clozaril level on 11/4/2024.  Directly questioned about night terrors and increased anxiety overnight, patient states that she is sleeping well.  We will continue to assess for possible increase of prazosin to assist with PTSD associated nightmares.  Tentative discharge pending CRR placement.    Meli denies acute suicidal/self-harm ideation/intent/plan upon direct inquiry at this time.  Meli remains behaviorally appropriate, no agitation or aggression noted on exam or in report.  Meli also denies HI/AH/VH, and does " not appear overtly manic.  No overt delusions or paranoia are verbalized. Impulse control is intact.  Meli remains adherent to her current psychotropic medication regimen and denies any side effects from medications, as well as none noted on exam.    Psychiatric Review of Systems:  Behavior over the last 24 hours:  unchanged.   Sleep: good  Appetite: good  Medication side effects: none  ROS: no complaints, denies shortness of breath or chest pain and all other systems are negative for acute changes        Objective    Vitals:  Vitals:    10/31/24 0751   BP: 125/67   Pulse: 83   Resp: 19   Temp: 97.8 °F (36.6 °C)   SpO2: 97%       Laboratory Results:  I have personally reviewed all pertinent laboratory/tests results.  Most Recent Labs:   Lab Results   Component Value Date    WBC 8.16 10/28/2024    RBC 4.36 10/28/2024    HGB 13.5 10/28/2024    HCT 43.0 10/28/2024     10/28/2024    RDW 13.7 10/28/2024    NEUTROABS 3.61 10/28/2024    SODIUM 138 10/12/2024    K 4.1 10/12/2024     10/12/2024    CO2 29 10/12/2024    BUN 18 10/12/2024    CREATININE 0.97 10/12/2024    GLUC 146 (H) 10/12/2024    GLUF 98 08/10/2024    CALCIUM 9.2 10/12/2024    AST 12 (L) 10/12/2024    ALT 12 10/12/2024    ALKPHOS 84 10/12/2024    TP 6.7 10/12/2024    ALB 3.9 10/12/2024    TBILI 0.26 10/12/2024    VALPROICTOT 54 10/12/2024    AMMONIA 32 07/03/2024    NEO1YYJNYVWA 2.000 07/24/2024    HGBA1C 6.4 (H) 05/03/2024     05/03/2024       Mental Status Evaluation:  Appearance:  dressed appropriately, adequate grooming   Behavior:  cooperative, calm, bizarre, guarded   Speech:  normal rate and volume   Mood:  less anxious, less depressed   Affect:  constricted   Thought Process:  perseverative, concrete   Thought Content:  Less religiously preoccupation, paranoid ideation   Perceptual Disturbances: denies when asked, but talks to self at times   Risk Potential: Suicidal ideation - None  Homicidal ideation - None  Potential for  aggression - No   Memory:  recent and remote memory grossly intact   Sensorium  person, place, and time/date      Consciousness:  alert and awake   Attention: attention span and concentration are age appropriate   Insight:  limited   Judgment: limited   Gait/Station: normal gait/station   Motor Activity: no abnormal movements       Counseling / Coordination of Care:  Patient's progress reviewed with nursing staff.  Medications, treatment progress and treatment plan reviewed with patient.  Supportive counseling provided to the patient.    This note was completed in part utilizing Dragon dictation Software. Grammatical, translation, syntax errors, random word insertions, spelling mistakes, and incomplete sentences may be an occasional consequence of this system secondary to software limitations with voice recognition, ambient noise, and hardware issues. If you have any questions or concerns about the content, text, or information contained within the body of this dictation, please contact the provider for clarification

## 2024-10-31 NOTE — NURSING NOTE
Patient is withdrawn to her room. Cooperative with care, meal compliant, and medication compliant. Endorses anxiety and some depression. One time order of ibuprofen given for leg pain (see note). Denies SI, HI, AH, and VH. No complaints at this time. Safety checks ongoing.

## 2024-11-01 PROCEDURE — 99232 SBSQ HOSP IP/OBS MODERATE 35: CPT | Performed by: STUDENT IN AN ORGANIZED HEALTH CARE EDUCATION/TRAINING PROGRAM

## 2024-11-01 RX ADMIN — FAMOTIDINE 20 MG: 20 TABLET ORAL at 09:09

## 2024-11-01 RX ADMIN — TRAZODONE HYDROCHLORIDE 50 MG: 50 TABLET ORAL at 21:40

## 2024-11-01 RX ADMIN — CARVEDILOL 6.25 MG: 6.25 TABLET, FILM COATED ORAL at 09:08

## 2024-11-01 RX ADMIN — Medication 15 ML: at 09:08

## 2024-11-01 RX ADMIN — ATORVASTATIN CALCIUM 10 MG: 10 TABLET, FILM COATED ORAL at 09:08

## 2024-11-01 RX ADMIN — FAMOTIDINE 20 MG: 20 TABLET ORAL at 17:01

## 2024-11-01 RX ADMIN — CLOZAPINE 900 MG: 200 TABLET ORAL at 21:40

## 2024-11-01 RX ADMIN — FLUTICASONE FUROATE 1 PUFF: 100 POWDER RESPIRATORY (INHALATION) at 09:10

## 2024-11-01 RX ADMIN — MELATONIN TAB 3 MG 3 MG: 3 TAB at 21:40

## 2024-11-01 RX ADMIN — VALPROIC ACID 1250 MG: 500 SOLUTION ORAL at 21:41

## 2024-11-01 RX ADMIN — POLYETHYLENE GLYCOL 3350 17 G: 17 POWDER, FOR SOLUTION ORAL at 09:08

## 2024-11-01 RX ADMIN — LORAZEPAM 0.5 MG: 0.5 TABLET ORAL at 09:08

## 2024-11-01 RX ADMIN — CYANOCOBALAMIN TAB 1000 MCG 1000 MCG: 1000 TAB at 09:08

## 2024-11-01 RX ADMIN — CARVEDILOL 6.25 MG: 6.25 TABLET, FILM COATED ORAL at 17:01

## 2024-11-01 RX ADMIN — LORAZEPAM 0.5 MG: 0.5 TABLET ORAL at 17:01

## 2024-11-01 RX ADMIN — SENNOSIDES AND DOCUSATE SODIUM 2 TABLET: 8.6; 5 TABLET ORAL at 21:40

## 2024-11-01 NOTE — NURSING NOTE
"Patient is withdrawn to her room this shift. She endorses anxiety and depression. Patient +RIS and is seen whispering to herself in her room. She reports wanting to go home and then states \"Maybe I'll stay until [place]. Who knows. I don't care.\" Patient offered reassurance and support. She is medication compliant and cooperative with mouth checks. Encouraged to inform staff of any needs or concerns.    "

## 2024-11-01 NOTE — NURSING NOTE
Patient withdrawn to her room. She is cooperative and compliant with medications. She denies anxiety, depression, SI and AVH. Patient did ask if oxygen can be provided to her in case she can't breath. Continual safety check are ongoing.

## 2024-11-01 NOTE — TREATMENT TEAM
Pt visible in afternoon group stating she was tired.  Disheveled hair.      11/01/24 0900 11/01/24 1000 11/01/24 1100   Activity/Group Checklist   Group Exercise Other (Comment)  ( Recovery: 10 Principles ( [place] Dept of Health and Human Services)) Life Skills   Attendance Did not attend Did not attend Did not attend   Attendance Duration (min)  --   --   --    Interactions  --   --   --    Affect/Mood  --   --   --    Goals Achieved  --   --   --       11/01/24 1330   Activity/Group Checklist   Group Other (Comment)  (Positive traits and reflection)   Attendance Attended;Other (Comment)  (left early)   Attendance Duration (min) 31-45   Interactions Other (Comment)  (scant)   Affect/Mood Other (Comment)  (restless)   Goals Achieved Identified feelings;Identified relapse prevention strategies;Discussed coping strategies;Discussed self-esteem issues;Able to listen to others;Able to engage in interactions

## 2024-11-01 NOTE — PROGRESS NOTES
11/01/24 0744   Team Meeting   Meeting Type Daily Rounds   Initial Conference Date 11/01/24   Next Conference Date 11/04/24   Team Members Present   Team Members Present Physician;Nurse;;   Physician Team Member Dr Banuelos, JAIR Goldman   Nursing Team Member Aleida   Care Management Team Member Anum   Social Work Team Member Ayse   Patient/Family Present   Patient Present No   Patient's Family Present No     Refused dinner last night, withdrawn to room, Clozaril increased next level on 11/4

## 2024-11-01 NOTE — ASSESSMENT & PLAN NOTE
Continue Depakote to 1250 mg nightly  Most recent VPA level 54 on 10/12/24  CMP on 10/12/24 reviewed, WNL  Clozaril 900 mg nightly for schizoaffective disorder  Clozaril monitoring:  CRP, CBC/diff, CK QMon for monitoring  VSS, without evidence of tachycardia  Most recent ECG reviewed 9/9/24 - normal ECG, NSR  Clozaril level 10/18 was 650, last ANC on 10/28 was 3.61; Clozaril level 660 on 10/28/24, Clozaril increased to 900 mg PO at HS   Obtain Clozaril level 11/04/2024  Senna to prevent Clozaril induced constipation  Discontinued-Risperdal to 1 mg qhs for ongoing psychosis 10/28/24  Continue Ativan 0.5 mg twice daily for anxiety   Continue melatonin 3 mg nightly for insomnia  Continue prazosin 3 mg nightly for PTSD associated nightmares; doses to be adjusted as indicated   Continue trazodone 50 mg nightly for insomnia  Discharge disposition: CRR

## 2024-11-01 NOTE — PROGRESS NOTES
Progress Note - Behavioral Health   Name: Meli Nowak 59 y.o. female I MRN: [id number]  Unit/Bed#: [facility] 651-02 I Date of Admission: 7/23/2024   Date of Service: 11/1/2024 I Hospital Day: 101     Assessment & Plan  Schizoaffective disorder, bipolar type (HCC)  Continue Depakote to 1250 mg nightly  Most recent VPA level 54 on 10/12/24  CMP on 10/12/24 reviewed, WNL  Clozaril 900 mg nightly for schizoaffective disorder  Clozaril monitoring:  CRP, CBC/diff, CK QMon for monitoring  VSS, without evidence of tachycardia  Most recent ECG reviewed 9/9/24 - normal ECG, NSR  Clozaril level 10/18 was 650, last ANC on 10/28 was 3.61; Clozaril level 660 on 10/28/24, Clozaril increased to 900 mg PO at HS   Obtain Clozaril level 11/04/2024  Senna to prevent Clozaril induced constipation  Discontinued-Risperdal to 1 mg qhs for ongoing psychosis 10/28/24  Continue Ativan 0.5 mg twice daily for anxiety   Continue melatonin 3 mg nightly for insomnia  Continue prazosin 3 mg nightly for PTSD associated nightmares; doses to be adjusted as indicated   Continue trazodone 50 mg nightly for insomnia  Discharge disposition: CRR          Plan   Progress Toward Goals:   Meli is progressing towards goals of inpatient psychiatric treatment by continued medication compliance and is attending therapeutic modalities on the milieu. However, the patient continues to require inpatient psychiatric hospitalization for continued medication management and titration to optimize symptom reduction, improve sleep hygiene, and demonstrate adequate self-care.    Recommended Treatment: Treatment plan and medication changes discussed and per the attending physician the plan is:    1.Continue with group therapy, milieu therapy and occupational therapy  2.Behavioral Health checks every 7 minutes  3.Continue frequent safety checks and vitals per unit protocol  4.Continue with SLIM medical management as indicated  5.Continue with current medication regimen  6.Will  review labs in the a.m.  7.Disposition Planning: Discharge planning and efforts remain ongoing    Behavioral Health Medications: all current active meds have been reviewed and continue current psychiatric medications.  Current Facility-Administered Medications   Medication Dose Route Frequency Provider Last Rate    acetaminophen  650 mg Oral Q4H PRN Marie Ziegler, CHRISTOPHERNP      acetaminophen  650 mg Oral Q4H PRN Marie Ziegler, CHRISTOPHERNP      acetaminophen  975 mg Oral Q6H PRN Marie Ziegler, JOSE ELIAS      aluminum-magnesium hydroxide-simethicone  30 mL Oral Q4H PRN Parris Bolanos, JOSE ELIAS      Artificial Tears  1 drop Both Eyes Q6H PRN Dereje Banuelos MD      atorvastatin  10 mg Oral Daily Marie Ziegler, JOSE ELIAS      bisacodyl  10 mg Oral Daily PRN Kristen Logan, JOSE ELIAS      bisacodyl  10 mg Rectal Daily PRN Marie Ziegler, JOSE ELIAS      carvedilol  6.25 mg Oral BID With Meals Marie Ziegler, JOSE ELIAS      cloZAPine  900 mg Oral HS Marie Ziegler, JOSE ELIAS      cyanocobalamin  1,000 mcg Oral Daily Marie Ziegler, JOSE ELIAS      Diclofenac Sodium  2 g Topical 4x Daily PRN Marie Ziegler, JOSE ELIAS      famotidine  20 mg Oral BID Shan Fitzgerald,       fluticasone  1 puff Inhalation Daily Marie Ziegler, JOSE ELIAS      hydrOXYzine HCL  25 mg Oral Q6H PRN Max 4/day Marie Ziegler, JOSE ELIAS      hydrOXYzine HCL  50 mg Oral Q6H PRN Max 4/day Marie Ziegler, JOSE ELIAS      ibuprofen  600 mg Oral Once JOSE ELIAS Figueroa      ipratropium-albuterol  3 mL Nebulization Q4H PRN Darin Lawton MD      LORazepam  0.5 mg Oral BID JOSE ELIAS Figueroa      Or    LORazepam  0.5 mg Intramuscular BID Marie Ziegler, JOSE ELIAS      LORazepam  1 mg Intramuscular Q6H PRN Max 3/day Marie Ziegler, JOSE ELIAS      LORazepam  1 mg Oral Q6H PRN Max 3/day JOSE ELIAS Figueroa      melatonin  3 mg Oral HS Marie Ziegler, JOSE ELIAS      mirtazapine  7.5 mg Oral HS PRN Marie Ziegler, JOSE ELIAS      Multivitamin  15 mL Oral Daily Marie Ziegler, JOSE ELIAS      nicotine  1 patch  "Transdermal Daily JOSE ELIAS Figueroa      OLANZapine  2.5 mg Oral Q6H PRN Dereje Banuelos MD      Or    OLANZapine  2.5 mg Intramuscular Q6H PRN Dereje Banuelos MD      OLANZapine  5 mg Oral Q6H PRN Dereje Banuelos MD      Or    OLANZapine  5 mg Intramuscular Q6H PRN Dereje Banuelos MD      ondansetron  4 mg Oral Q6H PRN Darin Lawton MD      polyethylene glycol  17 g Oral Daily JOSE ELIAS Ortega      polyethylene glycol  17 g Oral Daily PRN JOSE ELIAS Figueroa      prazosin  3 mg Oral HS JOSE ELIAS Figueroa      senna-docusate sodium  2 tablet Oral HS Rehana Borrego PA-C      traZODone  50 mg Oral HS JOSE ELIAS Figueroa      valproic acid  1,250 mg Oral HS JOSE ELIAS Figueroa         Risks / Benefits of Treatment:  Risks, benefits, and possible side effects of medications explained to patient and patient verbalizes understanding and agreement for treatment.       Subjective    Patient was seen today for continuation of care, records reviewed and patient was discussed with the morning case review team.    Meli was seen today for psychiatric follow-up.  On assessment today, Meli was more withdrawn and seen lying in her room. Stating that she is \"tired\" today, patient c/o leg pain secondary to exercise group. Voltaren gel ordered prn, patient is also encouraged to use prn Tylenol. AH \" not as much\", she reports that they are manageable and not bothersome. Clozaril recently increased to 900 mg daily for symptoms, patient is tolerating well. LBM reported as yesterday, bowel regimen effective in reducing drug associated constipation. Sleep and appetite well. Tentative discharge pending CRR placement.  .     Meli denies acute suicidal/self-harm ideation/intent/plan upon direct inquiry at this time.  Meli remains behaviorally appropriate, no agitation or aggression noted on exam or in report.  Impulse control is intact.  Meli remains adherent to her current psychotropic medication regimen and " denies any side effects from medications, as well as none noted on exam.    Psychiatric Review of Systems:  Behavior over the last 24 hours:  unchanged.   Sleep: good  Appetite: good  Medication side effects: none  ROS: no complaints, denies shortness of breath or chest pain and all other systems are negative for acute changes        Objective    Vitals:  Vitals:    11/01/24 0745   BP: 120/57   Pulse: 80   Resp: 18   Temp: 97.5 °F (36.4 °C)   SpO2: 95%       Laboratory Results:  I have personally reviewed all pertinent laboratory/tests results.  Most Recent Labs:   Lab Results   Component Value Date    WBC 8.16 10/28/2024    RBC 4.36 10/28/2024    HGB 13.5 10/28/2024    HCT 43.0 10/28/2024     10/28/2024    RDW 13.7 10/28/2024    NEUTROABS 3.61 10/28/2024    SODIUM 138 10/12/2024    K 4.1 10/12/2024     10/12/2024    CO2 29 10/12/2024    BUN 18 10/12/2024    CREATININE 0.97 10/12/2024    GLUC 146 (H) 10/12/2024    GLUF 98 08/10/2024    CALCIUM 9.2 10/12/2024    AST 12 (L) 10/12/2024    ALT 12 10/12/2024    ALKPHOS 84 10/12/2024    TP 6.7 10/12/2024    ALB 3.9 10/12/2024    TBILI 0.26 10/12/2024    VALPROICTOT 54 10/12/2024    AMMONIA 32 07/03/2024    ASC1KVAPJQSG 2.000 07/24/2024    HGBA1C 6.4 (H) 05/03/2024     05/03/2024       Mental Status Evaluation:  Appearance:  disheveled, marginal hygiene   Behavior:  cooperative, calm, guarded   Speech:  normal rate and volume   Mood:  less anxious, less depressed   Affect:  reactive   Thought Process:  linear, perseverative, concrete   Thought Content:  Buddhism preoccupation, paranoid ideation   Perceptual Disturbances: denies when asked, but talks to self at times   Risk Potential: Suicidal ideation - None  Homicidal ideation - None  Potential for aggression - No   Memory:  recent and remote memory grossly intact   Sensorium  person, place, and time/date      Consciousness:  alert and awake   Attention: attention span and concentration are age  appropriate   Insight:  limited   Judgment: limited   Gait/Station: normal gait/station   Motor Activity: no abnormal movements       Counseling / Coordination of Care:  Patient's progress reviewed with nursing staff.  Medications, treatment progress and treatment plan reviewed with patient.  Supportive counseling provided to the patient.    This note was completed in part utilizing Dragon dictation Software. Grammatical, translation, syntax errors, random word insertions, spelling mistakes, and incomplete sentences may be an occasional consequence of this system secondary to software limitations with voice recognition, ambient noise, and hardware issues. If you have any questions or concerns about the content, text, or information contained within the body of this dictation, please contact the provider for clarification

## 2024-11-02 PROCEDURE — 99232 SBSQ HOSP IP/OBS MODERATE 35: CPT | Performed by: STUDENT IN AN ORGANIZED HEALTH CARE EDUCATION/TRAINING PROGRAM

## 2024-11-02 RX ADMIN — LORAZEPAM 0.5 MG: 0.5 TABLET ORAL at 08:20

## 2024-11-02 RX ADMIN — Medication 15 ML: at 08:19

## 2024-11-02 RX ADMIN — ATORVASTATIN CALCIUM 10 MG: 10 TABLET, FILM COATED ORAL at 08:20

## 2024-11-02 RX ADMIN — CYANOCOBALAMIN TAB 1000 MCG 1000 MCG: 1000 TAB at 08:20

## 2024-11-02 RX ADMIN — CLOZAPINE 900 MG: 200 TABLET ORAL at 21:11

## 2024-11-02 RX ADMIN — FLUTICASONE FUROATE 1 PUFF: 100 POWDER RESPIRATORY (INHALATION) at 08:24

## 2024-11-02 RX ADMIN — POLYETHYLENE GLYCOL 3350 17 G: 17 POWDER, FOR SOLUTION ORAL at 08:19

## 2024-11-02 RX ADMIN — CARVEDILOL 6.25 MG: 6.25 TABLET, FILM COATED ORAL at 17:16

## 2024-11-02 RX ADMIN — SENNOSIDES AND DOCUSATE SODIUM 2 TABLET: 8.6; 5 TABLET ORAL at 21:11

## 2024-11-02 RX ADMIN — FAMOTIDINE 20 MG: 20 TABLET ORAL at 08:20

## 2024-11-02 RX ADMIN — MELATONIN TAB 3 MG 3 MG: 3 TAB at 21:11

## 2024-11-02 RX ADMIN — TRAZODONE HYDROCHLORIDE 50 MG: 50 TABLET ORAL at 21:11

## 2024-11-02 RX ADMIN — LORAZEPAM 0.5 MG: 0.5 TABLET ORAL at 17:16

## 2024-11-02 RX ADMIN — CARVEDILOL 6.25 MG: 6.25 TABLET, FILM COATED ORAL at 08:20

## 2024-11-02 RX ADMIN — FAMOTIDINE 20 MG: 20 TABLET ORAL at 17:16

## 2024-11-02 RX ADMIN — VALPROIC ACID 1250 MG: 500 SOLUTION ORAL at 21:11

## 2024-11-02 NOTE — NURSING NOTE
"Patient calm, cooperative, withdrawn to room lying in bed throughout shift.  She denies depression, anxiety, HI/SI/AVH and is able to make needs known.  She takes medications as scheduled and reports waking up throughout the night feeling \"out of breath\" for the past two weeks.  She was looking around the room at the oxygen covers on the wall while talking about being SOB.  Patient reports she notified nursing staff and providers.  VSS and she has no unmet needs at this time.    "

## 2024-11-02 NOTE — PROGRESS NOTES
Progress Note - Behavioral Health   Name: Meli Nowak 59 y.o. female I MRN: [id number]  Unit/Bed#: [facility] 651-02 I Date of Admission: 7/23/2024   Date of Service: 11/2/2024 I Hospital Day: 102     Assessment & Plan  Schizoaffective disorder, bipolar type (HCC)  Continue Depakote to 1250 mg nightly  Most recent VPA level 54 on 10/12/24  CMP on 10/12/24 reviewed, WNL  Clozaril 900 mg nightly for schizoaffective disorder  Clozaril monitoring:  CRP, CBC/diff, CK QMon for monitoring  VSS, without evidence of tachycardia  Most recent ECG reviewed 9/9/24 - normal ECG, NSR  Clozaril level 10/18 was 650, last ANC on 10/28 was 3.61; Clozaril level 660 on 10/28/24, Clozaril increased to 900 mg nightly on 10/29/24   Obtain Clozaril level 11/04/2024  Senna to prevent Clozaril induced constipation  Discontinued-Risperdal on 10/28/24  Continue Ativan 0.5 mg twice daily for anxiety   Continue melatonin 3 mg nightly for insomnia  Continue prazosin 3 mg nightly for PTSD associated nightmares; doses to be adjusted as indicated   Continue trazodone 50 mg nightly for insomnia  Discharge disposition: CRR          Plan   Progress Toward Goals: making slow improvement, discharge planning    Recommended Treatment:    - Encourage early mobility and having a structured day  - Provide frequent re-orientation, and cognitive stimulation  - Ensure assistive devices are in proper working order (eye-glasses, hearing aids)  - Encourage adequate hydration, nutrition and monitor bowel movements  - Maintain sleep-wake cycle: Uninterrupted sleep time; low-level lighting at night  - Fall precaution  - f/u SLIM recs regarding the medical problems   - f/u labs  - Check Clozaril level on 11/4/24  - Continue medication titration and treatment plan; adjust medication to optimize treatment response and as clinically indicated. .     Current medications:  Current Facility-Administered Medications   Medication Dose Route Frequency Provider Last Rate     acetaminophen  650 mg Oral Q4H PRN Marie Ziegler, CRNP      acetaminophen  650 mg Oral Q4H PRN Marie Ziegler, CRNP      acetaminophen  975 mg Oral Q6H PRN Marie Ziegler, CRNP      aluminum-magnesium hydroxide-simethicone  30 mL Oral Q4H PRN Parris Bolanos, CRNP      Artificial Tears  1 drop Both Eyes Q6H PRN Dereje Banuelos MD      atorvastatin  10 mg Oral Daily Marie Ziegler, CRNP      bisacodyl  10 mg Oral Daily PRN Kristen Logan, CRNP      bisacodyl  10 mg Rectal Daily PRN Marie Ziegler, CRNP      carvedilol  6.25 mg Oral BID With Meals Marie Ziegler, CRNP      cloZAPine  900 mg Oral HS Marie Ziegler, CRNP      cyanocobalamin  1,000 mcg Oral Daily Marie Ziegler, CRNP      Diclofenac Sodium  2 g Topical 4x Daily PRN Marie Ziegler, CRNP      famotidine  20 mg Oral BID Shan Fitzgerald DO      fluticasone  1 puff Inhalation Daily Marie Ziegler, CRNP      hydrOXYzine HCL  25 mg Oral Q6H PRN Max 4/day Marie Ziegler, CRNP      hydrOXYzine HCL  50 mg Oral Q6H PRN Max 4/day Marie Ziegler, CRNP      ibuprofen  600 mg Oral Once Marie Ziegler, CRNP      ipratropium-albuterol  3 mL Nebulization Q4H PRN Darin Lawton MD      LORazepam  0.5 mg Oral BID Marie Ziegler, CRNP      Or    LORazepam  0.5 mg Intramuscular BID Marie Ziegler, CRNP      LORazepam  1 mg Intramuscular Q6H PRN Max 3/day Marie Ziegler, CRNP      LORazepam  1 mg Oral Q6H PRN Max 3/day Marie Ziegler, CRNP      melatonin  3 mg Oral HS Marie Ziegler, CRNP      mirtazapine  7.5 mg Oral HS PRN Marie Ziegler, CRNP      Multivitamin  15 mL Oral Daily Marie Ziegler, CRNP      nicotine  1 patch Transdermal Daily Marie Ziegler, CRNP      OLANZapine  2.5 mg Oral Q6H PRN Dereje Banuelos MD      Or    OLANZapine  2.5 mg Intramuscular Q6H PRN Dereje Banuelos MD      OLANZapine  5 mg Oral Q6H PRN Dereje Banuelos MD      Or    OLANZapine  5 mg Intramuscular Q6H PRN Dereje Banuelos MD      ondansetron  4 mg Oral Q6H PRN  "Darin Lawton MD      polyethylene glycol  17 g Oral Daily Kristen Ludwig Doreen, JOSE ELIAS      polyethylene glycol  17 g Oral Daily PRN JOSE ELIAS Figueroa      prazosin  3 mg Oral HS JOSE ELIAS Figueroa      senna-docusate sodium  2 tablet Oral HS Rehana Borrego PA-C      traZODone  50 mg Oral HS JOSE ELIAS Figueroa      valproic acid  1,250 mg Oral HS JOSE ELIAS Figueroa          - Observation: routine            - VS: as per unit protocol  - Encourage group attendance and milieu therapy  - Dispo: To be determined      Subjective     Patient was visited on unit for continuing care; chart reviewed and discussed with multidisciplinary treatment team.  On approach, the patient was calm and cooperative. Denied any changes in mood, appetite, and energy level. Remains somatically preoccupied. No problem initiating and maintaining sleep.  Continues to report vague AH which \"does not bother as much\"; denied VH. Denied SI/HI, intent or plan upon direct inquiry at this time.    Patient continues to be visible in the milieu and remains interactive with staff and peers but requires frequent redirection and reorientation by the staff. No reports of aggression or self-injurious behavior on unit. No PRN medications used in the past 24 hours.    Patient accepted all offered medications and no adverse effects of medications noted or reported. Clozapine level was 660 on 10/28/24 and uptitrated to 900 mg nightly on 10/29/24 (level to be checked on 11/4/24). Given the concerns for manic sxs (potentially induced by uptitration of Prozac), Prozac tapered off and Depakote gradually uptitrated to 1250 mg nightly on 10/9/24 (VPA level: 54 on 10/12/24); doses to be adjusted as indicated.  Prazosin increased to 3 mg nightly on 10/29/24 for nightmares. Doses to be adjusted as indicated.    Objective    Current Mental Status Evaluation:  Appearance and attitude: appeared as stated age, casually dressed, with fair hygiene  Eye " "contact: fair  Motor Function: within normal limits, No PMA/PMR  Gait/station: Not observed  Speech: normal for rate, rhythm, volume, with increased latency and decreased amount  Language: No overt abnormality  Mood/affect: \"good\" / Affect was constricted but reactive, mood congruent  Thought Processes: concrete, ruminating  Thought content: denied suicidal ideations or homicidal ideations, no overt delusions elicited, somatic preoccupation, ruminations  Associations: concrete associations  Perceptual disturbances: vague auditory hallucinations, no visual hallucinations  Orientation: oriented to person and place  Cognitive Function: intact  Memory: not cooperative with formal MMSE  Fund of knowledge: diminished  Impulse control: good  Insight/judgment: limited/limited          Vital signs in last 24 hours:    Temp:  [98.2 °F (36.8 °C)-98.6 °F (37 °C)] 98.2 °F (36.8 °C)  HR:  [65-87] 65  BP: ()/(56-65) 113/65  Resp:  [17-18] 18  SpO2:  [95 %-100 %] 98 %  O2 Device: None (Room air)      Lab results: I have personally reviewed all pertinent laboratory/tests results      Counseling / Coordination of Care  Patient's progress discussed with staff in treatment team meeting.  Medications, treatment progress and treatment plan reviewed with patient.  Medication education provided to patient.  Supportive therapy provided to patient.  Reassurance and supportive therapy provided.  Reoriented to reality and reassured.  Encouraged participation in milieu and group therapy on the unit.  Crisis/safety plan discussed with patient.       Dereje Banuelos MD  Attending Psychiatrist   [facility]       This note was completed in part utilizing Dragon dictation Software. Grammatical, translation, syntax errors, random word insertions, spelling mistakes, and incomplete sentences may be an occasional consequence of this system secondary to software limitations with voice recognition, ambient noise, and " hardware issues. If you have any questions or concerns about the content, text, or information contained within the body of this dictation, please contact the provider for clarification.

## 2024-11-02 NOTE — PLAN OF CARE
Problem: DISCHARGE PLANNING - CARE MANAGEMENT  Goal: Discharge to post-acute care or home with appropriate resources  Description: INTERVENTIONS:  - Conduct assessment to determine patient/family and health care team treatment goals, and need for post-acute services based on payer coverage, community resources, and patient preferences, and barriers to discharge  - Address psychosocial, clinical, and financial barriers to discharge as identified in assessment in conjunction with the patient/family and health care team  - Arrange appropriate level of post-acute services according to patient’s   needs and preference and payer coverage in collaboration with the physician and health care team  - Communicate with and update the patient/family, physician, and health care team regarding progress on the discharge plan  - Arrange appropriate transportation to post-acute venues  Outcome: Progressing     Problem: Prexisting or High Potential for Compromised Skin Integrity  Goal: Skin integrity is maintained or improved  Description: INTERVENTIONS:  - Identify patients at risk for skin breakdown  - Assess and monitor skin integrity  - Assess and monitor nutrition and hydration status  - Monitor labs   - Assess for incontinence   - Turn and reposition patient  - Assist with mobility/ambulation  - Relieve pressure over bony prominences  - Avoid friction and shearing  - Provide appropriate hygiene as needed including keeping skin clean and dry  - Evaluate need for skin moisturizer/barrier cream  - Collaborate with interdisciplinary team   - Patient/family teaching  - Consider wound care consult   Outcome: Progressing     Problem: Alteration in Thoughts and Perception  Goal: Treatment Goal: Gain control of psychotic behaviors/thinking, reduce/eliminate presenting symptoms and demonstrate improved reality functioning upon discharge  Outcome: Progressing  Goal: Verbalize thoughts and feelings  Description: Interventions:  - Promote  a nonjudgmental and trusting relationship with the patient through active listening and therapeutic communication  - Assess patient's level of functioning, behavior and potential for risk  - Engage patient in 1 on 1 interactions  - Encourage patient to express fears, feelings, frustrations, and discuss symptoms    - [place] patient to reality, help patient recognize reality-based thinking   - Administer medications as ordered and assess for potential side effects  - Provide the patient education related to the signs and symptoms of the illness and desired effects of prescribed medications  Outcome: Progressing  Goal: Refrain from acting on delusional thinking/internal stimuli  Description: Interventions:  - Monitor patient closely, per order   - Utilize least restrictive measures   - Set reasonable limits, give positive feedback for acceptable   - Administer medications as ordered and monitor of potential side effects  Outcome: Progressing  Goal: Agree to be compliant with medication regime, as prescribed and report medication side effects  Description: Interventions:  - Offer appropriate PRN medication and supervise ingestion; conduct AIMS, as needed   Outcome: Progressing  Goal: Recognize dysfunctional thoughts, communicate reality-based thoughts at the time of discharge  Description: Interventions:  - Provide medication and psycho-education to assist patient in compliance and developing insight into his/her illness   Outcome: Progressing  Goal: Complete daily ADLs, including personal hygiene independently, as able  Description: Interventions:  - Observe, teach, and assist patient with ADLS  - Monitor and promote a balance of rest/activity, with adequate nutrition and elimination   Outcome: Progressing     Problem: Ineffective Coping  Goal: Identifies ineffective coping skills  Outcome: Progressing  Goal: Demonstrates healthy coping skills  Outcome: Progressing  Goal: Patient/Family participate in treatment and DC  plans  Description: Interventions:  - Provide therapeutic environment  Outcome: Progressing  Goal: Patient/Family verbalizes awareness of resources  Outcome: Progressing  Goal: Understands least restrictive measures  Description: Interventions:  - Utilize least restrictive behavior  Outcome: Progressing  Goal: Free from restraint events  Description: - Utilize least restrictive measures   - Provide behavioral interventions   - Redirect inappropriate behaviors   Outcome: Progressing     Problem: Risk for Self Injury/Neglect  Goal: Treatment Goal: Remain safe during length of stay, learn and adopt new coping skills, and be free of self-injurious ideation, impulses and acts at the time of discharge  Outcome: Progressing  Goal: Verbalize thoughts and feelings  Description: Interventions:  - Assess and re-assess patient's lethality and potential for self-injury  - Engage patient in 1:1 interactions, daily, for a minimum of 15 minutes  - Encourage patient to express feelings, fears, frustrations, hopes  - Establish rapport/trust with patient   Outcome: Progressing  Goal: Refrain from harming self  Description: Interventions:  - Monitor patient closely, per order  - Develop a trusting relationship  - Supervise medication ingestion, monitor effects and side effects   Outcome: Progressing  Goal: Recognize maladaptive responses and adopt new coping mechanisms  Outcome: Progressing     Problem: Depression  Goal: Treatment Goal: Demonstrate behavioral control of depressive symptoms, verbalize feelings of improved mood/affect, and adopt new coping skills prior to discharge  Outcome: Progressing  Goal: Verbalize thoughts and feelings  Description: Interventions:  - Assess and re-assess patient's level of risk   - Engage patient in 1:1 interactions, daily, for a minimum of 15 minutes   - Encourage patient to express feelings, fears, frustrations, hopes   Outcome: Progressing  Goal: Refrain from isolation  Description:  Interventions:  - Develop a trusting relationship   - Encourage socialization   Outcome: Progressing  Goal: Refrain from self-neglect  Outcome: Progressing     Problem: Anxiety  Goal: Anxiety is at manageable level  Description: Interventions:  - Assess and monitor patient's anxiety level.   - Monitor for signs and symptoms (heart palpitations, chest pain, shortness of breath, headaches, nausea, feeling jumpy, restlessness, irritable, apprehensive).   - Collaborate with interdisciplinary team and initiate plan and interventions as ordered.  - [place] patient to unit/surroundings  - Explain treatment plan  - Encourage participation in care  - Encourage verbalization of concerns/fears  - Identify coping mechanisms  - Assist in developing anxiety-reducing skills  - Administer/offer alternative therapies  - Limit or eliminate stimulants  Outcome: Progressing     Problem: SAFETY,RESTRAINT: NV/NON-SELF DESTRUCTIVE BEHAVIOR  Goal: Remains free of harm/injury (restraint for non violent/non self-detsructive behavior)  Description: INTERVENTIONS:  - Instruct patient/family regarding restraint use   - Assess and monitor physiologic and psychological status   - Provide interventions and comfort measures to meet assessed patient needs   - Identify and implement measures to help patient regain control  - Assess readiness for release of restraint   Outcome: Progressing  Goal: Returns to optimal restraint-free functioning  Description: INTERVENTIONS:  - Assess the patient's behavior and symptoms that indicate continued need for restraint  - Identify and implement measures to help patient regain control  - Assess readiness for release of restraint   Outcome: Progressing     Problem: Potential for Falls  Goal: Patient will remain free of falls  Description: INTERVENTIONS:  - Educate patient on patient safety including physical limitations  - Instruct patient to call for assistance with activity   - Consult OT/PT to assist with  strengthening/mobility   - Keep Call bell within reach  - Keep bed low and locked with side rails adjusted as appropriate  - Keep care items and personal belongings within reach  - Initiate and maintain comfort rounds  - Offer Toileting every 2 Hours, in advance of need  - Initiate/Maintain bed and chair alarm  - Obtain necessary fall risk management equipment: walker, wheelchair  - Apply yellow socks and bracelet for high fall risk patients  - Patient moved to room near nurses station  Outcome: Progressing     Problem: Nutrition/Hydration-ADULT  Goal: Nutrient/Hydration intake appropriate for improving, restoring or maintaining nutritional needs  Description: Monitor and assess patient's nutrition/hydration status for malnutrition. Collaborate with interdisciplinary team and initiate plan and interventions as ordered.  Monitor patient's weight and dietary intake as ordered or per policy. Utilize nutrition screening tool and intervene as necessary. Determine patient's food preferences and provide high-protein, high-caloric foods as appropriate.     INTERVENTIONS:  - Monitor oral intake, urinary output, labs, and treatment plans  - Assess nutrition and hydration status and recommend course of action  - Evaluate amount of meals eaten  - Assist patient with eating if necessary   - Allow adequate time for meals  - Recommend/ encourage appropriate diets, oral nutritional supplements, and vitamin/mineral supplements  - Order, calculate, and assess calorie counts as needed  - Recommend, monitor, and adjust tube feedings and TPN/PPN based on assessed needs  - Assess need for intravenous fluids  - Provide specific nutrition/hydration education as appropriate  - Include patient/family/caregiver in decisions related to nutrition  Outcome: Progressing

## 2024-11-02 NOTE — ASSESSMENT & PLAN NOTE
Continue Depakote to 1250 mg nightly  Most recent VPA level 54 on 10/12/24  CMP on 10/12/24 reviewed, WNL  Clozaril 900 mg nightly for schizoaffective disorder  Clozaril monitoring:  CRP, CBC/diff, CK QMon for monitoring  VSS, without evidence of tachycardia  Most recent ECG reviewed 9/9/24 - normal ECG, NSR  Clozaril level 10/18 was 650, last ANC on 10/28 was 3.61; Clozaril level 660 on 10/28/24, Clozaril increased to 900 mg nightly on 10/29/24   Obtain Clozaril level 11/04/2024  Senna to prevent Clozaril induced constipation  Discontinued-Risperdal on 10/28/24  Continue Ativan 0.5 mg twice daily for anxiety   Continue melatonin 3 mg nightly for insomnia  Continue prazosin 3 mg nightly for PTSD associated nightmares; doses to be adjusted as indicated   Continue trazodone 50 mg nightly for insomnia  Discharge disposition: CRR

## 2024-11-03 PROCEDURE — 99232 SBSQ HOSP IP/OBS MODERATE 35: CPT | Performed by: STUDENT IN AN ORGANIZED HEALTH CARE EDUCATION/TRAINING PROGRAM

## 2024-11-03 RX ADMIN — ATORVASTATIN CALCIUM 10 MG: 10 TABLET, FILM COATED ORAL at 08:29

## 2024-11-03 RX ADMIN — LORAZEPAM 0.5 MG: 0.5 TABLET ORAL at 08:29

## 2024-11-03 RX ADMIN — SENNOSIDES AND DOCUSATE SODIUM 2 TABLET: 8.6; 5 TABLET ORAL at 21:40

## 2024-11-03 RX ADMIN — TRAZODONE HYDROCHLORIDE 50 MG: 50 TABLET ORAL at 21:40

## 2024-11-03 RX ADMIN — CARVEDILOL 6.25 MG: 6.25 TABLET, FILM COATED ORAL at 08:29

## 2024-11-03 RX ADMIN — HYDROXYZINE HYDROCHLORIDE 50 MG: 50 TABLET, FILM COATED ORAL at 14:08

## 2024-11-03 RX ADMIN — Medication 15 ML: at 08:29

## 2024-11-03 RX ADMIN — ACETAMINOPHEN 975 MG: 325 TABLET, FILM COATED ORAL at 07:08

## 2024-11-03 RX ADMIN — VALPROIC ACID 1250 MG: 500 SOLUTION ORAL at 21:40

## 2024-11-03 RX ADMIN — LORAZEPAM 0.5 MG: 0.5 TABLET ORAL at 17:16

## 2024-11-03 RX ADMIN — CARVEDILOL 6.25 MG: 6.25 TABLET, FILM COATED ORAL at 17:16

## 2024-11-03 RX ADMIN — CYANOCOBALAMIN TAB 1000 MCG 1000 MCG: 1000 TAB at 08:29

## 2024-11-03 RX ADMIN — POLYETHYLENE GLYCOL 3350 17 G: 17 POWDER, FOR SOLUTION ORAL at 08:30

## 2024-11-03 RX ADMIN — MELATONIN TAB 3 MG 3 MG: 3 TAB at 21:40

## 2024-11-03 RX ADMIN — FAMOTIDINE 20 MG: 20 TABLET ORAL at 08:29

## 2024-11-03 RX ADMIN — CLOZAPINE 900 MG: 200 TABLET ORAL at 21:40

## 2024-11-03 RX ADMIN — PRAZOSIN HYDROCHLORIDE 3 MG: 1 CAPSULE ORAL at 21:40

## 2024-11-03 RX ADMIN — FAMOTIDINE 20 MG: 20 TABLET ORAL at 17:17

## 2024-11-03 RX ADMIN — FLUTICASONE FUROATE 1 PUFF: 100 POWDER RESPIRATORY (INHALATION) at 08:58

## 2024-11-03 NOTE — NURSING NOTE
Patient reports PRN Atarax is effective. Patient currently sitting in the dayroom with a peer and coloring. No further complaints at this time.

## 2024-11-03 NOTE — NURSING NOTE
"Patient approached nurse's station reporting \"8 1/2 out of 10\" anxiety after laying in bed. Patient stated \"I feel like I'm going to come out of my head soon. I feel like I'm riding a race car and can't stop.\" Patient is unable to state source of anxiety. PRN Atarax 50 mg administered at 1408.   "

## 2024-11-03 NOTE — PROGRESS NOTES
Progress Note - Behavioral Health   Name: Meli Nowak 59 y.o. female I MRN: [id number]  Unit/Bed#: [facility] 651-02 I Date of Admission: 7/23/2024   Date of Service: 11/3/2024 I Hospital Day: 103     Assessment & Plan  Schizoaffective disorder, bipolar type (HCC)  Continue Depakote to 1250 mg nightly  Most recent VPA level 54 on 10/12/24  CMP on 10/12/24 reviewed, WNL  Clozaril 900 mg nightly for schizoaffective disorder  Clozaril monitoring:  CRP, CBC/diff, CK QMon for monitoring  VSS, without evidence of tachycardia  Most recent ECG reviewed 9/9/24 - normal ECG, NSR  Clozaril level 660 on 10/28/24 - Clozaril increased to 900 mg nightly on 10/29/24 and Clozaril level to be checked on 11/04/2024  Senna to prevent Clozaril induced constipation  Discontinued-Risperdal on 10/28/24  Continue Ativan 0.5 mg twice daily for anxiety   Continue melatonin 3 mg nightly for insomnia  Continue prazosin 3 mg nightly for PTSD associated nightmares; doses to be adjusted as indicated   Continue trazodone 50 mg nightly for insomnia  Discharge disposition: CRR          Plan   Progress Toward Goals: mood is stabilizing, placement pending    Recommended Treatment:    - Encourage early mobility and having a structured day  - Provide frequent re-orientation, and cognitive stimulation  - Ensure assistive devices are in proper working order (eye-glasses, hearing aids)  - Encourage adequate hydration, nutrition and monitor bowel movements  - Maintain sleep-wake cycle: Uninterrupted sleep time; low-level lighting at night  - Fall precaution  - f/u SLIM recs regarding the medical problems   - f/u labs  - Check Clozaril level on 11/4/24  - Continue medication titration and treatment plan; adjust medication to optimize treatment response and as clinically indicated. .     Current medications:  Current Facility-Administered Medications   Medication Dose Route Frequency Provider Last Rate    acetaminophen  650 mg Oral Q4H PRN JOSE ELIAS Figueroa       acetaminophen  650 mg Oral Q4H PRN Marie Ziegler, CRNP      acetaminophen  975 mg Oral Q6H PRN Marie Ziegler, CRNP      aluminum-magnesium hydroxide-simethicone  30 mL Oral Q4H PRN Parris Bolanos, CRNP      Artificial Tears  1 drop Both Eyes Q6H PRN Dereje Banuelos MD      atorvastatin  10 mg Oral Daily Marie Ziegler, CRNP      bisacodyl  10 mg Oral Daily PRN Kristen Logan, CRNP      bisacodyl  10 mg Rectal Daily PRN Marie Ziegler, CRNP      carvedilol  6.25 mg Oral BID With Meals Marie Ziegler, CRNP      cloZAPine  900 mg Oral HS Marie Ziegler, CRNP      cyanocobalamin  1,000 mcg Oral Daily Marie Ziegler, CRNP      Diclofenac Sodium  2 g Topical 4x Daily PRN Marie Ziegler, CRNP      famotidine  20 mg Oral BID Shan Fitzgerald, DO      fluticasone  1 puff Inhalation Daily Marie Ziegler, CRNP      hydrOXYzine HCL  25 mg Oral Q6H PRN Max 4/day Marie Ziegler, CRNP      hydrOXYzine HCL  50 mg Oral Q6H PRN Max 4/day Marie Ziegler, CRNP      ibuprofen  600 mg Oral Once Marie Ziegler, CRNP      ipratropium-albuterol  3 mL Nebulization Q4H PRN Darin Lawton MD      LORazepam  0.5 mg Oral BID Marie Ziegler, CRNP      Or    LORazepam  0.5 mg Intramuscular BID Marie Ziegler, CRNP      LORazepam  1 mg Intramuscular Q6H PRN Max 3/day Marie Ziegler, CRNP      LORazepam  1 mg Oral Q6H PRN Max 3/day Marie Ziegler, CHRISTOPHERNP      melatonin  3 mg Oral HS Marie Ziegler, CRNP      mirtazapine  7.5 mg Oral HS PRN Marie Ziegler, CRNP      Multivitamin  15 mL Oral Daily Marie Ziegler, CRNP      nicotine  1 patch Transdermal Daily Marie Ziegler, CHRISTOPHERNP      OLANZapine  2.5 mg Oral Q6H PRN Dereje Banuelos MD      Or    OLANZapine  2.5 mg Intramuscular Q6H PRN Dereje Banuelos MD      OLANZapine  5 mg Oral Q6H PRN Dereje Banuelos MD      Or    OLANZapine  5 mg Intramuscular Q6H PRN Dereje Banuelos MD      ondansetron  4 mg Oral Q6H PRN Darin Lawton MD      polyethylene glycol  17 g Oral  "Daily Kristen VaniJOSE ELIAS Weller      polyethylene glycol  17 g Oral Daily PRN JOSE ELIAS Figueroa      prazosin  3 mg Oral HS JOSE ELIAS Figueroa      senna-docusate sodium  2 tablet Oral HS Rehana Borrego PA-C      traZODone  50 mg Oral HS JOSE ELIAS Figueroa      valproic acid  1,250 mg Oral HS JOSE ELIAS Figueroa          - Observation: routine            - VS: as per unit protocol  - Diet: Regular diet  - Encourage group attendance and milieu therapy  - Dispo: To be determined      Subjective     Patient was visited on unit for continuing care; chart reviewed and discussed with the nursing staff.  On approach, the patient was calm, pleasant and cooperative. Endorsed better mood and less anxiety sxs. Appeared less somatically preoccupied.  Denied any changes in appetite, and energy level. No problem initiating and maintaining sleep.  Denied A/VH since yesterday. Continues to report intrusive thoughts and negative ruminations. Denied SI/HI, intent or plan upon direct inquiry at this time.    Patient continues to be intermittently visible in the milieu and interacts with select staff and peers. No reports of aggression or self-injurious behavior on unit. No PRN medications used in the past 24 hours.    Patient accepted all offered medications and no adverse effects of medications noted or reported. Clozaril increased to 900 mg nightly on 10/29/24; level to be checked tomorrow.     Objective    Current Mental Status Evaluation:  Appearance and attitude: appeared as stated age, dressed in hospital attire, with fair hygiene  Eye contact: good  Motor Function: within normal limits, No PMA/PMR  Gait/station: Not observed  Speech: talking in soft tone with normal latency and decreased amount  Language: No overt abnormality  Mood/affect: \"good\" / Affect was constricted but reactive, mood congruent, brighter  Thought Processes: linear, concrete  Thought content: denied suicidal ideations or homicidal " ideations, no overt delusions elicited, mild paranoia, less somatically preoccupied  Associations: concrete associations  Perceptual disturbances: denies Auditory/Visual/Tactile Hallucinations  Orientation: oriented to time, person, place and to the situational context  Cognitive Function: intact  Memory: not cooperative with formal MMSE  Fund of knowledge: diminished  Impulse control: good  Insight/judgment: limited/limited          Vital signs in last 24 hours:    Temp:  [65 °F (18.3 °C)-97.5 °F (36.4 °C)] 96.9 °F (36.1 °C)  HR:  [80-98] 80  BP: (101-144)/(55-86) 136/69  Resp:  [16-18] 18  SpO2:  [94 %-97 %] 97 %  O2 Device: None (Room air)      Lab results: I have personally reviewed all pertinent laboratory/tests results      Counseling / Coordination of Care  Patient's progress reviewed with nursing staff.  Medications, treatment progress and treatment plan reviewed with patient.  Medication education provided to patient.  Supportive therapy provided to patient.  Cognitive techniques utilized during the session.  Reassurance and supportive therapy provided.  Reoriented to reality and reassured.  Encouraged participation in milieu and group therapy on the unit.  Crisis/safety plan discussed with patient.       Dereje Banuelos MD  Attending Psychiatrist   [facility]       This note was completed in part utilizing Dragon dictation Software. Grammatical, translation, syntax errors, random word insertions, spelling mistakes, and incomplete sentences may be an occasional consequence of this system secondary to software limitations with voice recognition, ambient noise, and hardware issues. If you have any questions or concerns about the content, text, or information contained within the body of this dictation, please contact the provider for clarification.

## 2024-11-03 NOTE — NURSING NOTE
Patient is visible and ambulating throughout the unit. Patient reported pain radiating to her hip and PRN Tylenol was administered. Medication was effective with patient reporting 1/10 pain after tylenol administration. Patient medication complaint, reported no psych s/s or suicidal ideations. No complaints or concerns expressed by the patient on this shift.

## 2024-11-03 NOTE — ASSESSMENT & PLAN NOTE
Continue Depakote to 1250 mg nightly  Most recent VPA level 54 on 10/12/24  CMP on 10/12/24 reviewed, WNL  Clozaril 900 mg nightly for schizoaffective disorder  Clozaril monitoring:  CRP, CBC/diff, CK QMon for monitoring  VSS, without evidence of tachycardia  Most recent ECG reviewed 9/9/24 - normal ECG, NSR  Clozaril level 660 on 10/28/24 - Clozaril increased to 900 mg nightly on 10/29/24 and Clozaril level to be checked on 11/04/2024  Senna to prevent Clozaril induced constipation  Discontinued-Risperdal on 10/28/24  Continue Ativan 0.5 mg twice daily for anxiety   Continue melatonin 3 mg nightly for insomnia  Continue prazosin 3 mg nightly for PTSD associated nightmares; doses to be adjusted as indicated   Continue trazodone 50 mg nightly for insomnia  Discharge disposition: CRR

## 2024-11-03 NOTE — NURSING NOTE
Pt withdrawn to room listening to radio most of the day. Medication and meal compliant. Denies SI/HI. Q 15 minute checks maintained.

## 2024-11-03 NOTE — PLAN OF CARE
Problem: Prexisting or High Potential for Compromised Skin Integrity  Goal: Skin integrity is maintained or improved  Description: INTERVENTIONS:  - Identify patients at risk for skin breakdown  - Assess and monitor skin integrity  - Assess and monitor nutrition and hydration status  - Monitor labs   - Assess for incontinence   - Turn and reposition patient  - Assist with mobility/ambulation  - Relieve pressure over bony prominences  - Avoid friction and shearing  - Provide appropriate hygiene as needed including keeping skin clean and dry  - Evaluate need for skin moisturizer/barrier cream  - Collaborate with interdisciplinary team   - Patient/family teaching  - Consider wound care consult   Outcome: Progressing     Problem: Alteration in Thoughts and Perception  Goal: Treatment Goal: Gain control of psychotic behaviors/thinking, reduce/eliminate presenting symptoms and demonstrate improved reality functioning upon discharge  Outcome: Progressing  Goal: Verbalize thoughts and feelings  Description: Interventions:  - Promote a nonjudgmental and trusting relationship with the patient through active listening and therapeutic communication  - Assess patient's level of functioning, behavior and potential for risk  - Engage patient in 1 on 1 interactions  - Encourage patient to express fears, feelings, frustrations, and discuss symptoms    - [place] patient to reality, help patient recognize reality-based thinking   - Administer medications as ordered and assess for potential side effects  - Provide the patient education related to the signs and symptoms of the illness and desired effects of prescribed medications  Outcome: Progressing  Goal: Refrain from acting on delusional thinking/internal stimuli  Description: Interventions:  - Monitor patient closely, per order   - Utilize least restrictive measures   - Set reasonable limits, give positive feedback for acceptable   - Administer medications as ordered and monitor  of potential side effects  Outcome: Progressing  Goal: Agree to be compliant with medication regime, as prescribed and report medication side effects  Description: Interventions:  - Offer appropriate PRN medication and supervise ingestion; conduct AIMS, as needed   Outcome: Progressing  Goal: Recognize dysfunctional thoughts, communicate reality-based thoughts at the time of discharge  Description: Interventions:  - Provide medication and psycho-education to assist patient in compliance and developing insight into his/her illness   Outcome: Progressing  Goal: Complete daily ADLs, including personal hygiene independently, as able  Description: Interventions:  - Observe, teach, and assist patient with ADLS  - Monitor and promote a balance of rest/activity, with adequate nutrition and elimination   Outcome: Progressing     Problem: Risk for Self Injury/Neglect  Goal: Treatment Goal: Remain safe during length of stay, learn and adopt new coping skills, and be free of self-injurious ideation, impulses and acts at the time of discharge  Outcome: Progressing  Goal: Verbalize thoughts and feelings  Description: Interventions:  - Assess and re-assess patient's lethality and potential for self-injury  - Engage patient in 1:1 interactions, daily, for a minimum of 15 minutes  - Encourage patient to express feelings, fears, frustrations, hopes  - Establish rapport/trust with patient   Outcome: Progressing  Goal: Refrain from harming self  Description: Interventions:  - Monitor patient closely, per order  - Develop a trusting relationship  - Supervise medication ingestion, monitor effects and side effects   Outcome: Progressing     Problem: Depression  Goal: Treatment Goal: Demonstrate behavioral control of depressive symptoms, verbalize feelings of improved mood/affect, and adopt new coping skills prior to discharge  Outcome: Progressing  Goal: Refrain from isolation  Description: Interventions:  - Develop a trusting  relationship   - Encourage socialization   Outcome: Progressing  Goal: Refrain from self-neglect  Outcome: Progressing     Problem: Anxiety  Goal: Anxiety is at manageable level  Description: Interventions:  - Assess and monitor patient's anxiety level.   - Monitor for signs and symptoms (heart palpitations, chest pain, shortness of breath, headaches, nausea, feeling jumpy, restlessness, irritable, apprehensive).   - Collaborate with interdisciplinary team and initiate plan and interventions as ordered.  - [place] patient to unit/surroundings  - Explain treatment plan  - Encourage participation in care  - Encourage verbalization of concerns/fears  - Identify coping mechanisms  - Assist in developing anxiety-reducing skills  - Administer/offer alternative therapies  - Limit or eliminate stimulants  Outcome: Progressing

## 2024-11-04 LAB
BASOPHILS # BLD AUTO: 0.11 THOUSANDS/ΜL (ref 0–0.1)
BASOPHILS NFR BLD AUTO: 1 % (ref 0–1)
CK SERPL-CCNC: 33 U/L (ref 26–192)
CLOZAPINE SERPL-MCNC: 894 NG/ML (ref 350–900)
CRP SERPL QL: 1.2 MG/L
EOSINOPHIL # BLD AUTO: 0 THOUSAND/ΜL (ref 0–0.61)
EOSINOPHIL NFR BLD AUTO: 0 % (ref 0–6)
ERYTHROCYTE [DISTWIDTH] IN BLOOD BY AUTOMATED COUNT: 13.5 % (ref 11.6–15.1)
HCT VFR BLD AUTO: 40.4 % (ref 34.8–46.1)
HGB BLD-MCNC: 13.1 G/DL (ref 11.5–15.4)
IMM GRANULOCYTES # BLD AUTO: 0.02 THOUSAND/UL (ref 0–0.2)
IMM GRANULOCYTES NFR BLD AUTO: 0 % (ref 0–2)
LYMPHOCYTES # BLD AUTO: 4.08 THOUSANDS/ΜL (ref 0.6–4.47)
LYMPHOCYTES NFR BLD AUTO: 49 % (ref 14–44)
MCH RBC QN AUTO: 31 PG (ref 26.8–34.3)
MCHC RBC AUTO-ENTMCNC: 32.4 G/DL (ref 31.4–37.4)
MCV RBC AUTO: 96 FL (ref 82–98)
MONOCYTES # BLD AUTO: 0.8 THOUSAND/ΜL (ref 0.17–1.22)
MONOCYTES NFR BLD AUTO: 10 % (ref 4–12)
NEUTROPHILS # BLD AUTO: 3.3 THOUSANDS/ΜL (ref 1.85–7.62)
NEUTS SEG NFR BLD AUTO: 40 % (ref 43–75)
NRBC BLD AUTO-RTO: 0 /100 WBCS
PLATELET # BLD AUTO: 206 THOUSANDS/UL (ref 149–390)
PMV BLD AUTO: 10 FL (ref 8.9–12.7)
RBC # BLD AUTO: 4.23 MILLION/UL (ref 3.81–5.12)
WBC # BLD AUTO: 8.31 THOUSAND/UL (ref 4.31–10.16)

## 2024-11-04 PROCEDURE — 99232 SBSQ HOSP IP/OBS MODERATE 35: CPT | Performed by: STUDENT IN AN ORGANIZED HEALTH CARE EDUCATION/TRAINING PROGRAM

## 2024-11-04 PROCEDURE — 80159 DRUG ASSAY CLOZAPINE: CPT

## 2024-11-04 PROCEDURE — 86140 C-REACTIVE PROTEIN: CPT

## 2024-11-04 PROCEDURE — 82550 ASSAY OF CK (CPK): CPT

## 2024-11-04 PROCEDURE — 85025 COMPLETE CBC W/AUTO DIFF WBC: CPT

## 2024-11-04 RX ADMIN — ATORVASTATIN CALCIUM 10 MG: 10 TABLET, FILM COATED ORAL at 08:22

## 2024-11-04 RX ADMIN — TRAZODONE HYDROCHLORIDE 50 MG: 50 TABLET ORAL at 21:19

## 2024-11-04 RX ADMIN — ACETAMINOPHEN 975 MG: 325 TABLET, FILM COATED ORAL at 02:16

## 2024-11-04 RX ADMIN — FLUTICASONE FUROATE 1 PUFF: 100 POWDER RESPIRATORY (INHALATION) at 08:23

## 2024-11-04 RX ADMIN — CYANOCOBALAMIN TAB 1000 MCG 1000 MCG: 1000 TAB at 08:22

## 2024-11-04 RX ADMIN — VALPROIC ACID 1250 MG: 500 SOLUTION ORAL at 21:21

## 2024-11-04 RX ADMIN — LORAZEPAM 0.5 MG: 0.5 TABLET ORAL at 17:01

## 2024-11-04 RX ADMIN — SENNOSIDES AND DOCUSATE SODIUM 2 TABLET: 8.6; 5 TABLET ORAL at 21:19

## 2024-11-04 RX ADMIN — CARVEDILOL 6.25 MG: 6.25 TABLET, FILM COATED ORAL at 08:10

## 2024-11-04 RX ADMIN — PRAZOSIN HYDROCHLORIDE 3 MG: 1 CAPSULE ORAL at 21:19

## 2024-11-04 RX ADMIN — LORAZEPAM 0.5 MG: 0.5 TABLET ORAL at 08:10

## 2024-11-04 RX ADMIN — CLOZAPINE 900 MG: 200 TABLET ORAL at 21:19

## 2024-11-04 RX ADMIN — Medication 15 ML: at 08:22

## 2024-11-04 RX ADMIN — FAMOTIDINE 20 MG: 20 TABLET ORAL at 08:22

## 2024-11-04 RX ADMIN — POLYETHYLENE GLYCOL 3350 17 G: 17 POWDER, FOR SOLUTION ORAL at 08:23

## 2024-11-04 RX ADMIN — FAMOTIDINE 20 MG: 20 TABLET ORAL at 17:01

## 2024-11-04 RX ADMIN — CARVEDILOL 6.25 MG: 6.25 TABLET, FILM COATED ORAL at 17:00

## 2024-11-04 RX ADMIN — MELATONIN TAB 3 MG 3 MG: 3 TAB at 21:19

## 2024-11-04 NOTE — TREATMENT TEAM
11/04/24 0216   Pain Assessment   Pain Assessment Tool 0-10   Pain Score 7   Pain Location/Orientation Orientation: Bilateral;Location: Arm   Pain Onset/Description Onset: Sudden   Patient's Stated Pain Goal No pain   Hospital Pain Intervention(s) Medication (See MAR)     Patient is c/o of b/l arm pain. Prn tylenol 975 mg was administered at 0216. Will reassess for effectiveness.

## 2024-11-04 NOTE — PROGRESS NOTES
Pt did not attend groups.      11/04/24 0900 11/04/24 1000 11/04/24 1100   Activity/Group Checklist   Group Exercise Community meeting Admission/Discharge   Attendance Did not attend Did not attend Other (Comment)  (lacks capacity)      11/04/24 1330   Activity/Group Checklist   Group Other (Comment)  (Journaling and reminiscing)   Attendance Did not attend

## 2024-11-04 NOTE — PROGRESS NOTES
11/04/24 0753   Team Meeting   Meeting Type Daily Rounds   Initial Conference Date 11/04/24   Next Conference Date 11/05/24   Team Members Present   Team Members Present Physician;Nurse;;   Physician Team Member Dr Banuelos, JAIR Goldman   Nursing Team Member Clarissa   Care Management Team Member Anum   Social Work Team Member Ayse   Patient/Family Present   Patient Present No   Patient's Family Present No     Discharge pending CRR, reports that it felt like she was riding in a race car, atarax given, labs this evening.

## 2024-11-04 NOTE — PLAN OF CARE
Pt did not attend any groups when prompted or offered.   Problem: Alteration in Thoughts and Perception  Goal: Attend and participate in unit activities, including therapeutic, recreational, and educational groups  Description: Interventions:  -Encourage Visitation and family involvement in care  Outcome: Progressing

## 2024-11-04 NOTE — PLAN OF CARE
Problem: Alteration in Thoughts and Perception  Goal: Treatment Goal: Gain control of psychotic behaviors/thinking, reduce/eliminate presenting symptoms and demonstrate improved reality functioning upon discharge  Outcome: Progressing  Goal: Verbalize thoughts and feelings  Description: Interventions:  - Promote a nonjudgmental and trusting relationship with the patient through active listening and therapeutic communication  - Assess patient's level of functioning, behavior and potential for risk  - Engage patient in 1 on 1 interactions  - Encourage patient to express fears, feelings, frustrations, and discuss symptoms    - [place] patient to reality, help patient recognize reality-based thinking   - Administer medications as ordered and assess for potential side effects  - Provide the patient education related to the signs and symptoms of the illness and desired effects of prescribed medications  Outcome: Progressing  Goal: Refrain from acting on delusional thinking/internal stimuli  Description: Interventions:  - Monitor patient closely, per order   - Utilize least restrictive measures   - Set reasonable limits, give positive feedback for acceptable   - Administer medications as ordered and monitor of potential side effects  Outcome: Progressing  Goal: Agree to be compliant with medication regime, as prescribed and report medication side effects  Description: Interventions:  - Offer appropriate PRN medication and supervise ingestion; conduct AIMS, as needed   Outcome: Progressing  Goal: Recognize dysfunctional thoughts, communicate reality-based thoughts at the time of discharge  Description: Interventions:  - Provide medication and psycho-education to assist patient in compliance and developing insight into his/her illness   Outcome: Progressing  Goal: Complete daily ADLs, including personal hygiene independently, as able  Description: Interventions:  - Observe, teach, and assist patient with ADLS  - Monitor and  promote a balance of rest/activity, with adequate nutrition and elimination   Outcome: Progressing     Problem: Risk for Self Injury/Neglect  Goal: Treatment Goal: Remain safe during length of stay, learn and adopt new coping skills, and be free of self-injurious ideation, impulses and acts at the time of discharge  Outcome: Progressing  Goal: Verbalize thoughts and feelings  Description: Interventions:  - Assess and re-assess patient's lethality and potential for self-injury  - Engage patient in 1:1 interactions, daily, for a minimum of 15 minutes  - Encourage patient to express feelings, fears, frustrations, hopes  - Establish rapport/trust with patient   Outcome: Progressing  Goal: Refrain from harming self  Description: Interventions:  - Monitor patient closely, per order  - Develop a trusting relationship  - Supervise medication ingestion, monitor effects and side effects   Outcome: Progressing  Goal: Recognize maladaptive responses and adopt new coping mechanisms  Outcome: Progressing     Problem: Depression  Goal: Treatment Goal: Demonstrate behavioral control of depressive symptoms, verbalize feelings of improved mood/affect, and adopt new coping skills prior to discharge  Outcome: Progressing  Goal: Verbalize thoughts and feelings  Description: Interventions:  - Assess and re-assess patient's level of risk   - Engage patient in 1:1 interactions, daily, for a minimum of 15 minutes   - Encourage patient to express feelings, fears, frustrations, hopes   Outcome: Progressing  Goal: Refrain from isolation  Description: Interventions:  - Develop a trusting relationship   - Encourage socialization   Outcome: Progressing  Goal: Refrain from self-neglect  Outcome: Progressing     Problem: Anxiety  Goal: Anxiety is at manageable level  Description: Interventions:  - Assess and monitor patient's anxiety level.   - Monitor for signs and symptoms (heart palpitations, chest pain, shortness of breath, headaches, nausea,  feeling jumpy, restlessness, irritable, apprehensive).   - Collaborate with interdisciplinary team and initiate plan and interventions as ordered.  - [place] patient to unit/surroundings  - Explain treatment plan  - Encourage participation in care  - Encourage verbalization of concerns/fears  - Identify coping mechanisms  - Assist in developing anxiety-reducing skills  - Administer/offer alternative therapies  - Limit or eliminate stimulants  Outcome: Progressing

## 2024-11-04 NOTE — NURSING NOTE
Patient is pleasant, calm and cooperative with care. Patient endorse anxiety and depression. Medication compliant. Patient is withdrawn to room during the shift. Patient encouraged to make needs known. Safety checks ongoing.

## 2024-11-04 NOTE — NURSING NOTE
Pt guarded but med-compliant with good appetite and steady gait.  Isolative, napping in her room.  VSS.  Did not attend group.  Denied AH.  Monitored for safety and support.

## 2024-11-04 NOTE — NURSING NOTE
Patient resting in bed listening to radio. Patient accepted scheduled medications. Patient pleasant on approach. Patient able to and encouraged to inform staff of any needs.

## 2024-11-04 NOTE — ASSESSMENT & PLAN NOTE
Continue Depakote to 1250 mg nightly  Most recent VPA level 54 on 10/12/24  CMP on 10/12/24 reviewed, WNL  Clozaril 900 mg nightly for schizoaffective disorder  Clozaril monitoring:  CRP, CBC/diff, CK QMon for monitoring  ANC 11/4/2024-3.30  VSS, without evidence of tachycardia  Most recent ECG reviewed 9/9/24 - normal ECG, NSR  Clozaril level 660 on 10/28/24 - Clozaril increased to 900 mg nightly on 10/29/24 and Clozaril level to be checked on 11/04/2024  Senna to prevent Clozaril induced constipation  Discontinued-Risperdal on 10/28/24  Continue Ativan 0.5 mg twice daily for anxiety   Continue melatonin 3 mg nightly for insomnia  Continue prazosin 3 mg nightly for PTSD associated nightmares; doses to be adjusted as indicated   Continue trazodone 50 mg nightly for insomnia  Discharge disposition: CRR

## 2024-11-04 NOTE — PROGRESS NOTES
Progress Note - Behavioral Health   Name: Meli Nowak 59 y.o. female I MRN: [id number]  Unit/Bed#: [facility] 651-02 I Date of Admission: 7/23/2024   Date of Service: 11/4/2024 I Hospital Day: 104     Assessment & Plan  Schizoaffective disorder, bipolar type (HCC)  Continue Depakote to 1250 mg nightly  Most recent VPA level 54 on 10/12/24  CMP on 10/12/24 reviewed, WNL  Clozaril 900 mg nightly for schizoaffective disorder  Clozaril monitoring:  CRP, CBC/diff, CK QMon for monitoring  ANC 11/4/2024-3.30  VSS, without evidence of tachycardia  Most recent ECG reviewed 9/9/24 - normal ECG, NSR  Clozaril level 660 on 10/28/24 - Clozaril increased to 900 mg nightly on 10/29/24 and Clozaril level to be checked on 11/04/2024  Senna to prevent Clozaril induced constipation  Discontinued-Risperdal on 10/28/24  Continue Ativan 0.5 mg twice daily for anxiety   Continue melatonin 3 mg nightly for insomnia  Continue prazosin 3 mg nightly for PTSD associated nightmares; doses to be adjusted as indicated   Continue trazodone 50 mg nightly for insomnia  Discharge disposition: CRR          Plan   Progress Toward Goals:   Meli is progressing towards goals of inpatient psychiatric treatment by continued medication compliance and is attending therapeutic modalities on the milieu. However, the patient continues to require inpatient psychiatric hospitalization for continued medication management and titration to optimize symptom reduction, improve sleep hygiene, and demonstrate adequate self-care.    Recommended Treatment: Treatment plan and medication changes discussed and per the attending physician the plan is:    1.Continue with group therapy, milieu therapy and occupational therapy  2.Behavioral Health checks every 7 minutes  3.Continue frequent safety checks and vitals per unit protocol  4.Continue with SLIM medical management as indicated  5.Continue with current medication regimen  6.Will review labs in the a.m.  7.Disposition  Planning: Discharge planning and efforts remain ongoing    Behavioral Health Medications: all current active meds have been reviewed and continue current psychiatric medications.  Current Facility-Administered Medications   Medication Dose Route Frequency Provider Last Rate    acetaminophen  650 mg Oral Q4H PRN Marie Ziegler, CHRISTOPHERNP      acetaminophen  650 mg Oral Q4H PRN Marie Ziegler, CHRISTOPHERNP      acetaminophen  975 mg Oral Q6H PRN Mraie Ziegler, JOSE ELIAS      aluminum-magnesium hydroxide-simethicone  30 mL Oral Q4H PRN Parris Bolanos, JOSE ELIAS      Artificial Tears  1 drop Both Eyes Q6H PRN Dereje Banuelos MD      atorvastatin  10 mg Oral Daily Marie Ziegler, JOSE ELIAS      bisacodyl  10 mg Oral Daily PRN Kristen Logan, JOSE ELIAS      bisacodyl  10 mg Rectal Daily PRN Marie Ziegler, JOSE ELIAS      carvedilol  6.25 mg Oral BID With Meals Marie Ziegler, JOSE ELIAS      cloZAPine  900 mg Oral HS Marie Ziegler, JOSE ELIAS      cyanocobalamin  1,000 mcg Oral Daily Marie Ziegler, JOSE ELIAS      Diclofenac Sodium  2 g Topical 4x Daily PRN JOSE ELIAS Figueroa      famotidine  20 mg Oral BID Shan Fitzgerald, DO      fluticasone  1 puff Inhalation Daily Marie Ziegler, JOSE ELIAS      hydrOXYzine HCL  25 mg Oral Q6H PRN Max 4/day Marie Ziegler, JOSE ELIAS      hydrOXYzine HCL  50 mg Oral Q6H PRN Max 4/day Marie Ziegler, JOSE ELIAS      ibuprofen  600 mg Oral Once Marie Ziegler, JOSE ELIAS      ipratropium-albuterol  3 mL Nebulization Q4H PRN Darin Lawton MD      LORazepam  0.5 mg Oral BID JOSE ELIAS Figueroa      Or    LORazepam  0.5 mg Intramuscular BID Marie Ziegler, JOSE ELIAS      LORazepam  1 mg Intramuscular Q6H PRN Max 3/day Marie Ziegler, JOSE ELIAS      LORazepam  1 mg Oral Q6H PRN Max 3/day Marie Ziegler, JOSE ELIAS      melatonin  3 mg Oral HS Marie Ziegler, JOSE ELIAS      mirtazapine  7.5 mg Oral HS PRN Marie Ziegler, JOSE ELIAS      Multivitamin  15 mL Oral Daily Marie Ziegler, JOSE ELIAS      nicotine  1 patch Transdermal Daily Marie Ziegler, JOSE ELIAS       "OLANZapine  2.5 mg Oral Q6H PRN Dereje Banuelos MD      Or    OLANZapine  2.5 mg Intramuscular Q6H PRN Dereje Banuelos MD      OLANZapine  5 mg Oral Q6H PRN Dereje Banuelos MD      Or    OLANZapine  5 mg Intramuscular Q6H PRN Dereje Banuelos MD      ondansetron  4 mg Oral Q6H PRN Darin Lawton MD      polyethylene glycol  17 g Oral Daily JOSE ELIAS Ortega      polyethylene glycol  17 g Oral Daily PRN JOSE ELIAS Figueroa      prazosin  3 mg Oral HS JOSE ELIAS Figueroa      senna-docusate sodium  2 tablet Oral HS Rehana Borrego PA-C      traZODone  50 mg Oral HS JOSE ELIAS Figueroa      valproic acid  1,250 mg Oral HS JOSE ELIAS Figueroa         Risks / Benefits of Treatment:  Risks, benefits, and possible side effects of medications explained to patient and patient verbalizes understanding and agreement for treatment.       Subjective    Patient was seen today for continuation of care, records reviewed and patient was discussed with the morning case review team.    Meli was seen today for psychiatric follow-up.  On assessment today, Meli was brighter on approach.  Still somatically preoccupied, patient reports pain to bilateral lower extremities and asked this writer if \"will they need to be amputated \".  Tylenol effective in reducing symptoms, patient is reassured that medicine continues to follow.  Denying auditory hallucinations, patient continues to be paranoid and must be frequently redirected by staff and peers.  Clozaril currently prescribed at 900 mg nightly, weekly labs obtained.  CBC, CK, and C-reactive protein WNL, we will obtain clozapine level this evening.  As needed Atarax utilized overnight for breakthrough anxiety, patient is encouraged to keep using coping skills to also assist with symptoms.  Tentative discharge pending CRR placement.    Meli denies acute suicidal/self-harm ideation/intent/plan upon direct inquiry at this time.  [place] remains behaviorally appropriate, no " agitation or aggression noted on exam or in report.  Meli also denies HI/AH/VH, and does not appear overtly manic.  No overt delusions or paranoia are verbalized. Impulse control is intact.  Meli remains adherent to her current psychotropic medication regimen and denies any side effects from medications, as well as none noted on exam.    Psychiatric Review of Systems:  Behavior over the last 24 hours:  unchanged.   Sleep: good  Appetite: good  Medication side effects:   ROS: no complaints, denies shortness of breath or chest pain and all other systems are negative for acute changes        Objective    Vitals:  Vitals:    11/03/24 2037   BP: 125/75   Pulse: 85   Resp: 20   Temp: 97.8 °F (36.6 °C)   SpO2: 95%       Laboratory Results:  I have personally reviewed all pertinent laboratory/tests results.  Most Recent Labs:   Lab Results   Component Value Date    WBC 8.31 11/04/2024    RBC 4.23 11/04/2024    HGB 13.1 11/04/2024    HCT 40.4 11/04/2024     11/04/2024    RDW 13.5 11/04/2024    NEUTROABS 3.30 11/04/2024    SODIUM 138 10/12/2024    K 4.1 10/12/2024     10/12/2024    CO2 29 10/12/2024    BUN 18 10/12/2024    CREATININE 0.97 10/12/2024    GLUC 146 (H) 10/12/2024    GLUF 98 08/10/2024    CALCIUM 9.2 10/12/2024    AST 12 (L) 10/12/2024    ALT 12 10/12/2024    ALKPHOS 84 10/12/2024    TP 6.7 10/12/2024    ALB 3.9 10/12/2024    TBILI 0.26 10/12/2024    VALPROICTOT 54 10/12/2024    AMMONIA 32 07/03/2024    RFH2AXSUGDFO 2.000 07/24/2024    HGBA1C 6.4 (H) 05/03/2024     05/03/2024       Mental Status Evaluation:  Appearance:  dressed appropriately, adequate grooming   Behavior:  cooperative, calm, still at times guarded   Speech:  normal rate and volume   Mood:  less anxious, less depressed   Affect:  constricted   Thought Process:  perseverative, concrete   Thought Content:  somatic preoccupation, paranoid ideation, ruminating thoughts   Perceptual Disturbances: denies when asked, but talks to self  at times   Risk Potential: Suicidal ideation - None  Homicidal ideation - None  Potential for aggression - No   Memory:  recent and remote memory grossly intact   Sensorium  person, place, and time/date      Consciousness:  alert and awake   Attention: attention span and concentration are age appropriate   Insight:  limited   Judgment: limited   Gait/Station: normal gait/station   Motor Activity: no abnormal movements       Counseling / Coordination of Care:  Patient's progress reviewed with nursing staff.  Medications, treatment progress and treatment plan reviewed with patient.  Supportive counseling provided to the patient.    This note was completed in part utilizing Dragon dictation Software. Grammatical, translation, syntax errors, random word insertions, spelling mistakes, and incomplete sentences may be an occasional consequence of this system secondary to software limitations with voice recognition, ambient noise, and hardware issues. If you have any questions or concerns about the content, text, or information contained within the body of this dictation, please contact the provider for clarification

## 2024-11-05 PROCEDURE — 99232 SBSQ HOSP IP/OBS MODERATE 35: CPT | Performed by: STUDENT IN AN ORGANIZED HEALTH CARE EDUCATION/TRAINING PROGRAM

## 2024-11-05 RX ADMIN — FLUTICASONE FUROATE 1 PUFF: 100 POWDER RESPIRATORY (INHALATION) at 08:15

## 2024-11-05 RX ADMIN — CARVEDILOL 6.25 MG: 6.25 TABLET, FILM COATED ORAL at 17:21

## 2024-11-05 RX ADMIN — PRAZOSIN HYDROCHLORIDE 3 MG: 1 CAPSULE ORAL at 21:42

## 2024-11-05 RX ADMIN — ATORVASTATIN CALCIUM 10 MG: 10 TABLET, FILM COATED ORAL at 08:12

## 2024-11-05 RX ADMIN — POLYETHYLENE GLYCOL 3350 17 G: 17 POWDER, FOR SOLUTION ORAL at 08:13

## 2024-11-05 RX ADMIN — LORAZEPAM 0.5 MG: 0.5 TABLET ORAL at 17:21

## 2024-11-05 RX ADMIN — ACETAMINOPHEN 975 MG: 325 TABLET, FILM COATED ORAL at 08:16

## 2024-11-05 RX ADMIN — CARVEDILOL 6.25 MG: 6.25 TABLET, FILM COATED ORAL at 08:12

## 2024-11-05 RX ADMIN — Medication 15 ML: at 08:13

## 2024-11-05 RX ADMIN — Medication 2 G: at 12:20

## 2024-11-05 RX ADMIN — ACETAMINOPHEN 975 MG: 325 TABLET, FILM COATED ORAL at 21:41

## 2024-11-05 RX ADMIN — FAMOTIDINE 20 MG: 20 TABLET ORAL at 08:12

## 2024-11-05 RX ADMIN — CYANOCOBALAMIN TAB 1000 MCG 1000 MCG: 1000 TAB at 08:12

## 2024-11-05 RX ADMIN — SENNOSIDES AND DOCUSATE SODIUM 2 TABLET: 8.6; 5 TABLET ORAL at 21:42

## 2024-11-05 RX ADMIN — TRAZODONE HYDROCHLORIDE 50 MG: 50 TABLET ORAL at 21:42

## 2024-11-05 RX ADMIN — MELATONIN TAB 3 MG 3 MG: 3 TAB at 21:42

## 2024-11-05 RX ADMIN — CLOZAPINE 900 MG: 200 TABLET ORAL at 21:42

## 2024-11-05 RX ADMIN — LORAZEPAM 0.5 MG: 0.5 TABLET ORAL at 08:12

## 2024-11-05 RX ADMIN — VALPROIC ACID 1250 MG: 500 SOLUTION ORAL at 21:42

## 2024-11-05 RX ADMIN — FAMOTIDINE 20 MG: 20 TABLET ORAL at 17:21

## 2024-11-05 NOTE — ASSESSMENT & PLAN NOTE
Continue Depakote to 1250 mg nightly  Most recent VPA level 54 on 10/12/24  CMP on 10/12/24 reviewed, WNL  Clozaril 900 mg nightly for schizoaffective disorder  Clozaril monitoring:  CRP, CBC/diff, CK QMon for monitoring  ANC 11/4/2024-3.30  VSS, without evidence of tachycardia  Most recent ECG reviewed 9/9/24 - normal ECG, NSR  Clozaril level 894 on 11/04/24 - Clozaril increased to 900 mg nightly on 10/29/24.  Senna to prevent Clozaril induced constipation  Discontinued-Risperdal on 10/28/24  Continue Ativan 0.5 mg twice daily for anxiety   Continue melatonin 3 mg nightly for insomnia  Continue prazosin 3 mg nightly for PTSD associated nightmares; doses to be adjusted as indicated   Continue trazodone 50 mg nightly for insomnia  Discharge disposition: CRR

## 2024-11-05 NOTE — PLAN OF CARE
Problem: DISCHARGE PLANNING - CARE MANAGEMENT  Goal: Discharge to post-acute care or home with appropriate resources  Description: INTERVENTIONS:  - Conduct assessment to determine patient/family and health care team treatment goals, and need for post-acute services based on payer coverage, community resources, and patient preferences, and barriers to discharge  - Address psychosocial, clinical, and financial barriers to discharge as identified in assessment in conjunction with the patient/family and health care team  - Arrange appropriate level of post-acute services according to patient’s   needs and preference and payer coverage in collaboration with the physician and health care team  - Communicate with and update the patient/family, physician, and health care team regarding progress on the discharge plan  - Arrange appropriate transportation to post-acute venues  Outcome: Progressing     Problem: Prexisting or High Potential for Compromised Skin Integrity  Goal: Skin integrity is maintained or improved  Description: INTERVENTIONS:  - Identify patients at risk for skin breakdown  - Assess and monitor skin integrity  - Assess and monitor nutrition and hydration status  - Monitor labs   - Assess for incontinence   - Turn and reposition patient  - Assist with mobility/ambulation  - Relieve pressure over bony prominences  - Avoid friction and shearing  - Provide appropriate hygiene as needed including keeping skin clean and dry  - Evaluate need for skin moisturizer/barrier cream  - Collaborate with interdisciplinary team   - Patient/family teaching  - Consider wound care consult   Outcome: Progressing     Problem: Alteration in Thoughts and Perception  Goal: Treatment Goal: Gain control of psychotic behaviors/thinking, reduce/eliminate presenting symptoms and demonstrate improved reality functioning upon discharge  Outcome: Progressing  Goal: Verbalize thoughts and feelings  Description: Interventions:  - Promote  a nonjudgmental and trusting relationship with the patient through active listening and therapeutic communication  - Assess patient's level of functioning, behavior and potential for risk  - Engage patient in 1 on 1 interactions  - Encourage patient to express fears, feelings, frustrations, and discuss symptoms    - [place] patient to reality, help patient recognize reality-based thinking   - Administer medications as ordered and assess for potential side effects  - Provide the patient education related to the signs and symptoms of the illness and desired effects of prescribed medications  Outcome: Progressing  Goal: Refrain from acting on delusional thinking/internal stimuli  Description: Interventions:  - Monitor patient closely, per order   - Utilize least restrictive measures   - Set reasonable limits, give positive feedback for acceptable   - Administer medications as ordered and monitor of potential side effects  Outcome: Progressing  Goal: Agree to be compliant with medication regime, as prescribed and report medication side effects  Description: Interventions:  - Offer appropriate PRN medication and supervise ingestion; conduct AIMS, as needed   Outcome: Progressing  Goal: Attend and participate in unit activities, including therapeutic, recreational, and educational groups  Description: Interventions:  -Encourage Visitation and family involvement in care  Outcome: Progressing  Goal: Recognize dysfunctional thoughts, communicate reality-based thoughts at the time of discharge  Description: Interventions:  - Provide medication and psycho-education to assist patient in compliance and developing insight into his/her illness   Outcome: Progressing  Goal: Complete daily ADLs, including personal hygiene independently, as able  Description: Interventions:  - Observe, teach, and assist patient with ADLS  - Monitor and promote a balance of rest/activity, with adequate nutrition and elimination   Outcome: Progressing      Problem: Ineffective Coping  Goal: Identifies ineffective coping skills  Outcome: Progressing  Goal: Demonstrates healthy coping skills  Outcome: Progressing  Goal: Participates in unit activities  Description: Interventions:  - Provide therapeutic environment   - Provide required programming   - Redirect inappropriate behaviors   Outcome: Progressing  Goal: Patient/Family participate in treatment and DC plans  Description: Interventions:  - Provide therapeutic environment  Outcome: Progressing  Goal: Patient/Family verbalizes awareness of resources  Outcome: Progressing  Goal: Understands least restrictive measures  Description: Interventions:  - Utilize least restrictive behavior  Outcome: Progressing  Goal: Free from restraint events  Description: - Utilize least restrictive measures   - Provide behavioral interventions   - Redirect inappropriate behaviors   Outcome: Progressing     Problem: Risk for Self Injury/Neglect  Goal: Treatment Goal: Remain safe during length of stay, learn and adopt new coping skills, and be free of self-injurious ideation, impulses and acts at the time of discharge  Outcome: Progressing  Goal: Verbalize thoughts and feelings  Description: Interventions:  - Assess and re-assess patient's lethality and potential for self-injury  - Engage patient in 1:1 interactions, daily, for a minimum of 15 minutes  - Encourage patient to express feelings, fears, frustrations, hopes  - Establish rapport/trust with patient   Outcome: Progressing  Goal: Refrain from harming self  Description: Interventions:  - Monitor patient closely, per order  - Develop a trusting relationship  - Supervise medication ingestion, monitor effects and side effects   Outcome: Progressing  Goal: Attend and participate in unit activities, including therapeutic, recreational, and educational groups  Description: Interventions:  - Provide therapeutic and educational activities daily, encourage attendance and participation,  and document same in the medical record  - Obtain collateral information, encourage visitation and family involvement in care   Outcome: Progressing  Goal: Recognize maladaptive responses and adopt new coping mechanisms  Outcome: Progressing     Problem: Depression  Goal: Treatment Goal: Demonstrate behavioral control of depressive symptoms, verbalize feelings of improved mood/affect, and adopt new coping skills prior to discharge  Outcome: Progressing  Goal: Verbalize thoughts and feelings  Description: Interventions:  - Assess and re-assess patient's level of risk   - Engage patient in 1:1 interactions, daily, for a minimum of 15 minutes   - Encourage patient to express feelings, fears, frustrations, hopes   Outcome: Progressing  Goal: Refrain from isolation  Description: Interventions:  - Develop a trusting relationship   - Encourage socialization   Outcome: Progressing  Goal: Refrain from self-neglect  Outcome: Progressing  Goal: Attend and participate in unit activities, including therapeutic, recreational, and educational groups  Description: Interventions:  - Provide therapeutic and educational activities daily, encourage attendance and participation, and document same in the medical record   Outcome: Progressing     Problem: Anxiety  Goal: Anxiety is at manageable level  Description: Interventions:  - Assess and monitor patient's anxiety level.   - Monitor for signs and symptoms (heart palpitations, chest pain, shortness of breath, headaches, nausea, feeling jumpy, restlessness, irritable, apprehensive).   - Collaborate with interdisciplinary team and initiate plan and interventions as ordered.  - [place] patient to unit/surroundings  - Explain treatment plan  - Encourage participation in care  - Encourage verbalization of concerns/fears  - Identify coping mechanisms  - Assist in developing anxiety-reducing skills  - Administer/offer alternative therapies  - Limit or eliminate stimulants  Outcome:  Progressing     Problem: SAFETY,RESTRAINT: NV/NON-SELF DESTRUCTIVE BEHAVIOR  Goal: Remains free of harm/injury (restraint for non violent/non self-detsructive behavior)  Description: INTERVENTIONS:  - Instruct patient/family regarding restraint use   - Assess and monitor physiologic and psychological status   - Provide interventions and comfort measures to meet assessed patient needs   - Identify and implement measures to help patient regain control  - Assess readiness for release of restraint   Outcome: Progressing  Goal: Returns to optimal restraint-free functioning  Description: INTERVENTIONS:  - Assess the patient's behavior and symptoms that indicate continued need for restraint  - Identify and implement measures to help patient regain control  - Assess readiness for release of restraint   Outcome: Progressing     Problem: Potential for Falls  Goal: Patient will remain free of falls  Description: INTERVENTIONS:  - Educate patient on patient safety including physical limitations  - Instruct patient to call for assistance with activity   - Consult OT/PT to assist with strengthening/mobility   - Keep Call bell within reach  - Keep bed low and locked with side rails adjusted as appropriate  - Keep care items and personal belongings within reach  - Initiate and maintain comfort rounds  - Offer Toileting every 2 Hours, in advance of need  - Initiate/Maintain bed and chair alarm  - Obtain necessary fall risk management equipment: walker, wheelchair  - Apply yellow socks and bracelet for high fall risk patients  - Patient moved to room near nurses station  Outcome: Progressing     Problem: Nutrition/Hydration-ADULT  Goal: Nutrient/Hydration intake appropriate for improving, restoring or maintaining nutritional needs  Description: Monitor and assess patient's nutrition/hydration status for malnutrition. Collaborate with interdisciplinary team and initiate plan and interventions as ordered.  Monitor patient's weight and  dietary intake as ordered or per policy. Utilize nutrition screening tool and intervene as necessary. Determine patient's food preferences and provide high-protein, high-caloric foods as appropriate.     INTERVENTIONS:  - Monitor oral intake, urinary output, labs, and treatment plans  - Assess nutrition and hydration status and recommend course of action  - Evaluate amount of meals eaten  - Assist patient with eating if necessary   - Allow adequate time for meals  - Recommend/ encourage appropriate diets, oral nutritional supplements, and vitamin/mineral supplements  - Order, calculate, and assess calorie counts as needed  - Recommend, monitor, and adjust tube feedings and TPN/PPN based on assessed needs  - Assess need for intravenous fluids  - Provide specific nutrition/hydration education as appropriate  - Include patient/family/caregiver in decisions related to nutrition  Outcome: Progressing

## 2024-11-05 NOTE — NURSING NOTE
Patient was awake and withdrawn to room, visible for snack. Pleasant and able to make needs known. Denies all psych s/s. Medication compliant and cooperative with care. Safety precautions maintained.

## 2024-11-05 NOTE — NURSING NOTE
Pt calm and cooperative. Bright and pleasant on approach. Good appetite. Attends some groups. Otherwise keeps mostly to self. Spends time listening to music quietly in room. On assessment Denies SI/HI AV/H. Endorses fluctuating anxiety Utilizes coping skills in addition to scheduled medications. Compliant w/ mouth checks. No need to utilize PRN medications. Denies unmet needs at this time. Q15' safety checks maintained. Will cont to offer support and encouragement as needed.

## 2024-11-05 NOTE — PROGRESS NOTES
11/05/24 0830   Team Meeting   Meeting Type Daily Rounds   Team Members Present   Team Members Present Physician;Nurse;;   Physician Team Member Dr Banuelos / Dr Alberto / JAIR Ziegler   Nursing Team Member Clarissa   Care Management Team Member Manju / Jacob / Tee   Social Work Team Member Ayse   Patient/Family Present   Patient Present No   Patient's Family Present No       Treatment Team Rounds Completed  Medical and Psychiatric Review Completed  D/C: Pt is reported to have had a good day and Clozaril is at 894. Pt is tentatively scheduled to discharge on 11/29/2024.

## 2024-11-05 NOTE — PROGRESS NOTES
Progress Note - Behavioral Health   Name: Meli Nowak 59 y.o. female I MRN: [id number]  Unit/Bed#: [facility] 651-02 I Date of Admission: 7/23/2024   Date of Service: 11/5/2024 I Hospital Day: 105     Assessment & Plan  Schizoaffective disorder, bipolar type (HCC)  Continue Depakote to 1250 mg nightly  Most recent VPA level 54 on 10/12/24  CMP on 10/12/24 reviewed, WNL  Clozaril 900 mg nightly for schizoaffective disorder  Clozaril monitoring:  CRP, CBC/diff, CK QMon for monitoring  ANC 11/4/2024-3.30  VSS, without evidence of tachycardia  Most recent ECG reviewed 9/9/24 - normal ECG, NSR  Clozaril level 894 on 11/04/24 - Clozaril increased to 900 mg nightly on 10/29/24.  Senna to prevent Clozaril induced constipation  Discontinued-Risperdal on 10/28/24  Continue Ativan 0.5 mg twice daily for anxiety   Continue melatonin 3 mg nightly for insomnia  Continue prazosin 3 mg nightly for PTSD associated nightmares; doses to be adjusted as indicated   Continue trazodone 50 mg nightly for insomnia  Discharge disposition: CRR          Plan   Progress Toward Goals:   Meli is progressing towards goals of inpatient psychiatric treatment by continued medication compliance and is attending therapeutic modalities on the milieu. However, the patient continues to require inpatient psychiatric hospitalization for continued medication management and titration to optimize symptom reduction, improve sleep hygiene, and demonstrate adequate self-care.    Recommended Treatment: Treatment plan and medication changes discussed and per the attending physician the plan is:    1.Continue with group therapy, milieu therapy and occupational therapy  2.Behavioral Health checks every 7 minutes  3.Continue frequent safety checks and vitals per unit protocol  4.Continue with SLIM medical management as indicated  5.Continue with current medication regimen  6.Will review labs in the a.m.  7.Disposition Planning: Discharge planning and efforts remain  ongoing    Behavioral Health Medications: all current active meds have been reviewed.  Current Facility-Administered Medications   Medication Dose Route Frequency Provider Last Rate    acetaminophen  650 mg Oral Q4H PRN Marie Ziegler, CRNP      acetaminophen  650 mg Oral Q4H PRN Marie Ziegler, CRNP      acetaminophen  975 mg Oral Q6H PRN Marie Ziegler, CRNP      aluminum-magnesium hydroxide-simethicone  30 mL Oral Q4H PRN Parris Bolanos, CRNP      Artificial Tears  1 drop Both Eyes Q6H PRN Dereje Banuelos MD      atorvastatin  10 mg Oral Daily Marie Ziegler, CRNP      bisacodyl  10 mg Oral Daily PRN Kristen Logan, CRNP      bisacodyl  10 mg Rectal Daily PRN Marie Ziegler, CRNP      carvedilol  6.25 mg Oral BID With Meals Marie Ziegler, CRNP      cloZAPine  900 mg Oral HS Marie Ziegler, CRNP      cyanocobalamin  1,000 mcg Oral Daily Marie Ziegler, CRNP      Diclofenac Sodium  2 g Topical 4x Daily PRN Marie Ziegler, CRNP      famotidine  20 mg Oral BID Shan Fitzgerald DO      fluticasone  1 puff Inhalation Daily Marie Ziegler, CRNP      hydrOXYzine HCL  25 mg Oral Q6H PRN Max 4/day Marie Ziegler, CRNP      hydrOXYzine HCL  50 mg Oral Q6H PRN Max 4/day Marie Ziegler, CRNP      ibuprofen  600 mg Oral Once Marie Ziegler, CRNP      ipratropium-albuterol  3 mL Nebulization Q4H PRN Darin Lawton MD      LORazepam  0.5 mg Oral BID Marie Ziegler, CRNP      Or    LORazepam  0.5 mg Intramuscular BID Marie Ziegler, CRNP      LORazepam  1 mg Intramuscular Q6H PRN Max 3/day Marie Ziegler, CRNP      LORazepam  1 mg Oral Q6H PRN Max 3/day Marie Ziegler, CRNP      melatonin  3 mg Oral HS Marie Ziegler, CRNP      mirtazapine  7.5 mg Oral HS PRN Marie Ziegler, CHRISTOPHERNP      Multivitamin  15 mL Oral Daily Marie Ziegler, CRNP      nicotine  1 patch Transdermal Daily Marie Ziegler, CHRISTOPHERNP      OLANZapine  2.5 mg Oral Q6H PRN Dereje Banuelos MD      Or    OLANZapine  2.5 mg Intramuscular  "Q6H PRN Dereje Banuelos MD      OLANZapine  5 mg Oral Q6H PRN Dereje Banuelos MD      Or    OLANZapine  5 mg Intramuscular Q6H PRN Dereje Banuelos MD      ondansetron  4 mg Oral Q6H PRN Darin Lawton MD      polyethylene glycol  17 g Oral Daily Kristenabdoulaye Ludwig Doreen, JOSE ELIAS      polyethylene glycol  17 g Oral Daily PRN JOSE ELIAS Figueroa      prazosin  3 mg Oral HS Marie Ziegler, JOSE ELIAS      senna-docusate sodium  2 tablet Oral HS Rehana Borrego PA-C      traZODone  50 mg Oral HS JOSE ELIAS Figueroa      valproic acid  1,250 mg Oral HS JOSE ELIAS Figueroa         Risks / Benefits of Treatment:  Risks, benefits, and possible side effects of medications explained to patient and patient verbalizes understanding and agreement for treatment.       Subjective    Patient was seen today for continuation of care, records reviewed and patient was discussed with the morning case review team.    Meli was seen today for psychiatric follow-up.  On assessment today, Meli was brighter on approach. Continues to be somatically preoccupied, reports pain to bilateral lower extremities and bilateral shoulders, pain relieved with use of Voltaren gel, tylenol as needed and massage to area. Denies AH/VH, continues to be paranoid and requires redirection from staff. Reports intermittent depression and anxiety related to current long hospital admission. She reports wanting to remain admitted until ready for discharge, as she is half-way there.. Stating she may be ready by diogo. Reports feeling tired at times. Endorses adequate appetite and sleep. Meli stated, \"I have out of body experiences at times\"., but did not elaborate.. Focused on going to group at the moment and getting make up. Podiatry consult requested as per patient request. Clozaril level 894 11/4/24. Continue clozaril 900 mg nightly, with weekly labs for paranoia and delusions. Tentative discharge pending CRR placement.     Meli denies acute suicidal/self-harm " ideation/intent/plan upon direct inquiry at this time.  Meli remains behaviorally appropriate, no agitation or aggression noted on exam or in report.  Meli also denies HI, and does not appear overtly manic. Impulse control is intact.  Meli remains adherent to her current psychotropic medication regimen and denies any side effects from medications, as well as none noted on exam.    Psychiatric Review of Systems:  Behavior over the last 24 hours:  unchanged.   Sleep: good  Appetite: good  Medication side effects: none  ROS: no complaints, denies shortness of breath or chest pain and all other systems are negative for acute changes        Objective    Vitals:  Vitals:    11/05/24 0726   BP: 126/63   Pulse: 76   Resp: 17   Temp: 97.5 °F (36.4 °C)   SpO2: 95%       Laboratory Results:  I have personally reviewed all pertinent laboratory/tests results.    Mental Status Evaluation:  Appearance:  disheveled, marginal hygiene   Behavior:  cooperative, calm, still at times guarded   Speech:  normal rate and volume   Mood:  less anxious, less depressed   Affect:  constricted   Thought Process:  perseverative, concrete   Thought Content:  somatic preoccupation, paranoid ideation, ruminating thoughts   Perceptual Disturbances: denies when asked, but talks to self at times   Risk Potential: Suicidal ideation - None  Homicidal ideation - None  Potential for aggression - No   Memory:  recent and remote memory grossly intact   Sensorium  person, place, and time/date      Consciousness:  alert and awake   Attention: attention span and concentration are age appropriate   Insight:  limited   Judgment: limited   Gait/Station: normal gait/station   Motor Activity: no abnormal movements       Counseling / Coordination of Care:  Patient's progress reviewed with nursing staff.  Medications, treatment progress and treatment plan reviewed with patient.  Supportive counseling provided to the patient.    This note was completed in part  utilizing Dragon dictation Software. Grammatical, translation, syntax errors, random word insertions, spelling mistakes, and incomplete sentences may be an occasional consequence of this system secondary to software limitations with voice recognition, ambient noise, and hardware issues. If you have any questions or concerns about the content, text, or information contained within the body of this dictation, please contact the provider for clarification

## 2024-11-06 PROCEDURE — 99232 SBSQ HOSP IP/OBS MODERATE 35: CPT | Performed by: STUDENT IN AN ORGANIZED HEALTH CARE EDUCATION/TRAINING PROGRAM

## 2024-11-06 RX ADMIN — TRAZODONE HYDROCHLORIDE 50 MG: 50 TABLET ORAL at 22:02

## 2024-11-06 RX ADMIN — VALPROIC ACID 1250 MG: 500 SOLUTION ORAL at 22:02

## 2024-11-06 RX ADMIN — LORAZEPAM 0.5 MG: 0.5 TABLET ORAL at 08:08

## 2024-11-06 RX ADMIN — CARVEDILOL 6.25 MG: 6.25 TABLET, FILM COATED ORAL at 08:08

## 2024-11-06 RX ADMIN — FAMOTIDINE 20 MG: 20 TABLET ORAL at 17:08

## 2024-11-06 RX ADMIN — CLOZAPINE 900 MG: 200 TABLET ORAL at 22:00

## 2024-11-06 RX ADMIN — CYANOCOBALAMIN TAB 1000 MCG 1000 MCG: 1000 TAB at 08:30

## 2024-11-06 RX ADMIN — LORAZEPAM 0.5 MG: 0.5 TABLET ORAL at 17:07

## 2024-11-06 RX ADMIN — CARVEDILOL 6.25 MG: 6.25 TABLET, FILM COATED ORAL at 17:07

## 2024-11-06 RX ADMIN — FLUTICASONE FUROATE 1 PUFF: 100 POWDER RESPIRATORY (INHALATION) at 08:10

## 2024-11-06 RX ADMIN — Medication 15 ML: at 08:30

## 2024-11-06 RX ADMIN — ACETAMINOPHEN 650 MG: 325 TABLET ORAL at 13:05

## 2024-11-06 RX ADMIN — MELATONIN TAB 3 MG 3 MG: 3 TAB at 22:02

## 2024-11-06 RX ADMIN — FAMOTIDINE 20 MG: 20 TABLET ORAL at 08:08

## 2024-11-06 RX ADMIN — ATORVASTATIN CALCIUM 10 MG: 10 TABLET, FILM COATED ORAL at 08:08

## 2024-11-06 NOTE — TREATMENT TEAM
Medicated as per order for c/o pain.   11/05/24 7432   Pain Assessment   Pain Assessment Tool 0-10   Pain Score 9   Pain Location/Orientation Orientation: Right;Location: Hip;Location: Leg;Location: Shoulder   Pain Onset/Description Onset: Ongoing   Effect of Pain on Daily Activities activity   Patient's Stated Pain Goal No pain   Hospital Pain Intervention(s) Medication (See MAR)

## 2024-11-06 NOTE — NURSING NOTE
Patient guarded, calm and cooperative, able to make needs known. Is occasionally visible on the module, social with select peers, attended 1 groups. Denies SI/HI/AVH, anxiety or depression. Medication compliant. Will continue to monitor.

## 2024-11-06 NOTE — PROGRESS NOTES
11/06/24 1000 11/06/24 1100 11/06/24 7090   Activity/Group Checklist   Group Community meeting Life Skills Other (Comment)  (Communication and boundaries)   Attendance Attended;Other (Comment)  (in and out) Did not attend Did not attend   Attendance Duration (min) 31-45  --   --    Interactions Other (Comment)  (anxious)  --   --    Affect/Mood Other (Comment)  (restless)  --   --    Goals Achieved Identified feelings;Identified triggers;Identified relapse prevention strategies;Able to listen to others;Able to engage in interactions;Able to manage/cope with feelings;Able to reflect/comment on own behavior;Able to self-disclose;Verbalized increased hopefulness;Able to recieve feedback  --   --

## 2024-11-06 NOTE — NURSING NOTE
Otto is isolative. [place] denies psych s/s. Meli is medication compliant. Emotional support provided. Will continue to monitor and support during treatment.

## 2024-11-06 NOTE — PLAN OF CARE
Problem: Prexisting or High Potential for Compromised Skin Integrity  Goal: Skin integrity is maintained or improved  Description: INTERVENTIONS:  - Identify patients at risk for skin breakdown  - Assess and monitor skin integrity  - Assess and monitor nutrition and hydration status  - Monitor labs   - Assess for incontinence   - Turn and reposition patient  - Assist with mobility/ambulation  - Relieve pressure over bony prominences  - Avoid friction and shearing  - Provide appropriate hygiene as needed including keeping skin clean and dry  - Evaluate need for skin moisturizer/barrier cream  - Collaborate with interdisciplinary team   - Patient/family teaching  - Consider wound care consult   Outcome: Progressing     Problem: Alteration in Thoughts and Perception  Goal: Refrain from acting on delusional thinking/internal stimuli  Description: Interventions:  - Monitor patient closely, per order   - Utilize least restrictive measures   - Set reasonable limits, give positive feedback for acceptable   - Administer medications as ordered and monitor of potential side effects  Outcome: Progressing     Problem: Ineffective Coping  Goal: Free from restraint events  Description: - Utilize least restrictive measures   - Provide behavioral interventions   - Redirect inappropriate behaviors   Outcome: Progressing     Problem: Risk for Self Injury/Neglect  Goal: Refrain from harming self  Description: Interventions:  - Monitor patient closely, per order  - Develop a trusting relationship  - Supervise medication ingestion, monitor effects and side effects   Outcome: Progressing     Problem: Depression  Goal: Refrain from self-neglect  Outcome: Progressing     Problem: Potential for Falls  Goal: Patient will remain free of falls  Description: INTERVENTIONS:  - Educate patient on patient safety including physical limitations  - Instruct patient to call for assistance with activity   - Consult OT/PT to assist with  strengthening/mobility   - Keep Call bell within reach  - Keep bed low and locked with side rails adjusted as appropriate  - Keep care items and personal belongings within reach  - Initiate and maintain comfort rounds  - Offer Toileting every 2 Hours, in advance of need  - Initiate/Maintain bed and chair alarm  - Obtain necessary fall risk management equipment: walker, wheelchair  - Apply yellow socks and bracelet for high fall risk patients  - Patient moved to room near nurses station  Outcome: Progressing

## 2024-11-06 NOTE — PROGRESS NOTES
11/06/24 0806   Team Meeting   Meeting Type Daily Rounds   Initial Conference Date 11/06/24   Next Conference Date 11/07/24   Team Members Present   Team Members Present Physician;Nurse;;   Physician Team Member Dr Banuelos, JAIR Goldman   Nursing Team Member Clarissa   Care Management Team Member Anum   Social Work Team Member Ayse   Patient/Family Present   Patient Present No   Patient's Family Present No     Reports pain, she lays in bed a lot, no attending groups, discharge pending CRR.

## 2024-11-06 NOTE — CASE MANAGEMENT
Call made to Evelina Chen, intake at [facility] tel# [phone], requested update on pt's CRR referral. CM informed that referral was approved by the [facility]. Referral is for [place] location, unable to obtain wait list time. Evelina reports [place] location is best fit for pt. Evelina reports this is a two bedroom apt, pt would have own room, staff present on site 24/7 with two hour checks.

## 2024-11-06 NOTE — PROGRESS NOTES
Progress Note - Behavioral Health   Name: Meli Nowak 59 y.o. female I MRN: [id number]  Unit/Bed#: [facility] 651-02 I Date of Admission: 7/23/2024   Date of Service: 11/6/2024 I Hospital Day: 106     Assessment & Plan  Schizoaffective disorder, bipolar type (HCC)  Continue Depakote to 1250 mg nightly  Most recent VPA level 54 on 10/12/24  CMP on 10/12/24 reviewed, WNL  Clozaril 900 mg nightly for schizoaffective disorder  Clozaril monitoring:  CRP, CBC/diff, CK QMon for monitoring  ANC 11/4/2024-3.30  VSS, without evidence of tachycardia  Most recent ECG reviewed 9/9/24 - normal ECG, NSR  Clozaril level 894 on 11/04/24 - Clozaril increased to 900 mg nightly on 10/29/24.  Senna to prevent Clozaril induced constipation  Discontinued-Risperdal on 10/28/24  Continue Ativan 0.5 mg twice daily for anxiety   Continue melatonin 3 mg nightly for insomnia  Continue prazosin 3 mg nightly for PTSD associated nightmares; doses to be adjusted as indicated   Continue trazodone 50 mg nightly for insomnia  Discharge disposition: CRR          Plan   Progress Toward Goals:   Meli is progressing towards goals of inpatient psychiatric treatment by continued medication compliance and is attending therapeutic modalities on the milieu. However, the patient continues to require inpatient psychiatric hospitalization for continued medication management and titration to optimize symptom reduction, improve sleep hygiene, and demonstrate adequate self-care.    Recommended Treatment: Treatment plan and medication changes discussed and per the attending physician the plan is:    1.Continue with group therapy, milieu therapy and occupational therapy  2.Behavioral Health checks every 7 minutes  3.Continue frequent safety checks and vitals per unit protocol  4.Continue with SLIM medical management as indicated  5.Continue with current medication regimen  6.Will review labs in the a.m.  7.Disposition Planning: Discharge planning and efforts remain  ongoing    Behavioral Health Medications: all current active meds have been reviewed and continue current psychiatric medications.  Current Facility-Administered Medications   Medication Dose Route Frequency Provider Last Rate    acetaminophen  650 mg Oral Q4H PRN Marie Ziegler, CRNP      acetaminophen  650 mg Oral Q4H PRN Marie Ziegler, CRNP      acetaminophen  975 mg Oral Q6H PRN Marie Ziegler, CRNP      aluminum-magnesium hydroxide-simethicone  30 mL Oral Q4H PRN Parris Bolanos, JOSE ELIAS      Artificial Tears  1 drop Both Eyes Q6H PRN Dereje Banuelos MD      atorvastatin  10 mg Oral Daily Marie Ziegler, CRNP      bisacodyl  10 mg Oral Daily PRN Kristen Logan, CRNP      bisacodyl  10 mg Rectal Daily PRN Marie Ziegler, CRNP      carvedilol  6.25 mg Oral BID With Meals Marie Ziegler, CRNP      cloZAPine  900 mg Oral HS Marie Ziegler, CRNP      cyanocobalamin  1,000 mcg Oral Daily Marie Ziegler, CRNP      Diclofenac Sodium  2 g Topical 4x Daily PRN Marie Ziegler, CRNP      famotidine  20 mg Oral BID Shan Fitzgerald, DO      fluticasone  1 puff Inhalation Daily Marie Ziegler, CRNP      hydrOXYzine HCL  25 mg Oral Q6H PRN Max 4/day Marie Ziegler, CRNP      hydrOXYzine HCL  50 mg Oral Q6H PRN Max 4/day Marie Ziegler, CHRISTOPHERNP      ibuprofen  600 mg Oral Once Marie Ziegler, CRNP      ipratropium-albuterol  3 mL Nebulization Q4H PRN Darin Lawton MD      LORazepam  0.5 mg Oral BID Marie Ziegler, JOSE ELIAS      Or    LORazepam  0.5 mg Intramuscular BID Marie Ziegler, CRNP      LORazepam  1 mg Intramuscular Q6H PRN Max 3/day Marie Ziegler, CRNP      LORazepam  1 mg Oral Q6H PRN Max 3/day Marie Ziegler, CHRISTOPHERNP      melatonin  3 mg Oral HS Marie Ziegler, CRNP      mirtazapine  7.5 mg Oral HS PRN Marie Ziegler, CHRISTOPHERNP      Multivitamin  15 mL Oral Daily Marie Ziegler, CRNP      nicotine  1 patch Transdermal Daily Marie Ziegler, CHRISTOPHERNP      OLANZapine  2.5 mg Oral Q6H PRN Dereje Banuelos,  "MD      Or    OLANZapine  2.5 mg Intramuscular Q6H PRN Dereje Banuelos MD      OLANZapine  5 mg Oral Q6H PRN Dereje Banuelos MD      Or    OLANZapine  5 mg Intramuscular Q6H PRN Dereje Banuelos MD      ondansetron  4 mg Oral Q6H PRN Darin Lawton MD      polyethylene glycol  17 g Oral Daily Kristenabdoulaye Ludwig Doreen, JOSE ELIAS      polyethylene glycol  17 g Oral Daily PRN JOSE ELIAS Figueroa      prazosin  3 mg Oral HS JOSE ELIAS Figueroa      senna-docusate sodium  2 tablet Oral HS Rehana Borrego PA-C      traZODone  50 mg Oral HS JOSE ELIAS Figueroa      valproic acid  1,250 mg Oral HS JOSE ELIAS Figueroa         Risks / Benefits of Treatment:  Risks, benefits, and possible side effects of medications explained to patient and patient verbalizes understanding and agreement for treatment.       Subjective    Patient was seen today for continuation of care, records reviewed and patient was discussed with the morning case review team.    Meli was seen today for psychiatric follow-up.  On assessment today, Meli was seen in her room.  Encouraged to be more active, patient states \"I had a panic attack last night \".   Breathing exercises performed to assist with symptoms, patient did not require any as needed medications.  No psychiatric symptoms voiced upon direct questioning, patient does continue to be somatic and states \"I cannot get out of bed \".  As needed Tylenol given and effective.  We will continue with Clozaril 900 mg nightly and Depakote 1250 mg nightly to assist with symptoms.  Clozaril level obtained on 11/4/2024 at 894.  We will not make any changes at this time.  Bowel movements also evaluated, patient states that she continues to go regularly and has not experienced any constipation.  Tentative discharge pending CRR placement.    Meli denies acute suicidal/self-harm ideation/intent/plan upon direct inquiry at this time.  Meli remains behaviorally appropriate, no agitation or aggression noted on " exam or in report.  Impulse control is intact.  Meli remains adherent to her current psychotropic medication regimen and denies any side effects from medications, as well as none noted on exam.    Psychiatric Review of Systems:  Behavior over the last 24 hours:  unchanged.   Sleep: good  Appetite: good  Medication side effects: none  ROS: no complaints, denies shortness of breath or chest pain and all other systems are negative for acute changes        Objective    Vitals:  Vitals:    11/06/24 0744   BP: 124/65   Pulse: 78   Resp: 16   Temp: 98.1 °F (36.7 °C)   SpO2: 93%       Laboratory Results:  I have personally reviewed all pertinent laboratory/tests results.  Most Recent Labs:   Lab Results   Component Value Date    WBC 8.31 11/04/2024    RBC 4.23 11/04/2024    HGB 13.1 11/04/2024    HCT 40.4 11/04/2024     11/04/2024    RDW 13.5 11/04/2024    NEUTROABS 3.30 11/04/2024    SODIUM 138 10/12/2024    K 4.1 10/12/2024     10/12/2024    CO2 29 10/12/2024    BUN 18 10/12/2024    CREATININE 0.97 10/12/2024    GLUC 146 (H) 10/12/2024    GLUF 98 08/10/2024    CALCIUM 9.2 10/12/2024    AST 12 (L) 10/12/2024    ALT 12 10/12/2024    ALKPHOS 84 10/12/2024    TP 6.7 10/12/2024    ALB 3.9 10/12/2024    TBILI 0.26 10/12/2024    VALPROICTOT 54 10/12/2024    AMMONIA 32 07/03/2024    [id number] 2.000 07/24/2024    HGBA1C 6.4 (H) 05/03/2024     05/03/2024       Mental Status Evaluation:  Appearance:  disheveled, marginal hygiene   Behavior:  cooperative, calm   Speech:  normal rate and volume   Mood:  less anxious, less depressed   Affect:  constricted   Thought Process:  perseverative, concrete   Thought Content:  somatic preoccupation, paranoid ideation   Perceptual Disturbances: auditory hallucinations still present, but less frequent   Risk Potential: Suicidal ideation - None  Homicidal ideation - None  Potential for aggression - No   Memory:  recent and remote memory grossly intact   Sensorium  person,  place, and time/date      Consciousness:  alert and awake   Attention: attention span and concentration are age appropriate   Insight:  limited   Judgment: limited   Gait/Station: normal gait/station   Motor Activity: no abnormal movements       Counseling / Coordination of Care:  Patient's progress reviewed with nursing staff.  Medications, treatment progress and treatment plan reviewed with patient.  Supportive counseling provided to the patient.    This note was completed in part utilizing Dragon dictation Software. Grammatical, translation, syntax errors, random word insertions, spelling mistakes, and incomplete sentences may be an occasional consequence of this system secondary to software limitations with voice recognition, ambient noise, and hardware issues. If you have any questions or concerns about the content, text, or information contained within the body of this dictation, please contact the provider for clarification

## 2024-11-06 NOTE — TREATMENT TEAM
11/06/24 1305   Pain Assessment   Pain Assessment Tool 0-10   Pain Score 6   Pain Location/Orientation Location: Back   Hospital Pain Intervention(s) Medication (See MAR)     Medication given for moderate pain in her back

## 2024-11-06 NOTE — NURSING NOTE
Pt guarded and withdrawn, attended group with encouragement.  VSS.  Med-compliant with good appetite and steady gait.  Monitored for safety and support.

## 2024-11-07 PROCEDURE — 99232 SBSQ HOSP IP/OBS MODERATE 35: CPT | Performed by: STUDENT IN AN ORGANIZED HEALTH CARE EDUCATION/TRAINING PROGRAM

## 2024-11-07 RX ORDER — PRAZOSIN HYDROCHLORIDE 5 MG/1
5 CAPSULE ORAL
Status: DISCONTINUED | OUTPATIENT
Start: 2024-11-07 | End: 2024-12-30 | Stop reason: HOSPADM

## 2024-11-07 RX ADMIN — ATORVASTATIN CALCIUM 10 MG: 10 TABLET, FILM COATED ORAL at 09:01

## 2024-11-07 RX ADMIN — CYANOCOBALAMIN TAB 1000 MCG 1000 MCG: 1000 TAB at 09:00

## 2024-11-07 RX ADMIN — FAMOTIDINE 20 MG: 20 TABLET ORAL at 17:01

## 2024-11-07 RX ADMIN — LORAZEPAM 0.5 MG: 0.5 TABLET ORAL at 09:01

## 2024-11-07 RX ADMIN — LORAZEPAM 0.5 MG: 0.5 TABLET ORAL at 17:01

## 2024-11-07 RX ADMIN — Medication 15 ML: at 09:02

## 2024-11-07 RX ADMIN — ACETAMINOPHEN 975 MG: 325 TABLET, FILM COATED ORAL at 11:13

## 2024-11-07 RX ADMIN — TRAZODONE HYDROCHLORIDE 50 MG: 50 TABLET ORAL at 21:33

## 2024-11-07 RX ADMIN — CARVEDILOL 6.25 MG: 6.25 TABLET, FILM COATED ORAL at 17:02

## 2024-11-07 RX ADMIN — FLUTICASONE FUROATE 1 PUFF: 100 POWDER RESPIRATORY (INHALATION) at 09:05

## 2024-11-07 RX ADMIN — FAMOTIDINE 20 MG: 20 TABLET ORAL at 09:01

## 2024-11-07 RX ADMIN — POLYETHYLENE GLYCOL 3350 17 G: 17 POWDER, FOR SOLUTION ORAL at 09:01

## 2024-11-07 RX ADMIN — CLOZAPINE 900 MG: 200 TABLET ORAL at 21:33

## 2024-11-07 RX ADMIN — VALPROIC ACID 1250 MG: 500 SOLUTION ORAL at 21:34

## 2024-11-07 RX ADMIN — PRAZOSIN HYDROCHLORIDE 5 MG: 5 CAPSULE ORAL at 21:33

## 2024-11-07 RX ADMIN — SENNOSIDES AND DOCUSATE SODIUM 2 TABLET: 8.6; 5 TABLET ORAL at 21:33

## 2024-11-07 RX ADMIN — MELATONIN TAB 3 MG 3 MG: 3 TAB at 21:33

## 2024-11-07 RX ADMIN — CARVEDILOL 6.25 MG: 6.25 TABLET, FILM COATED ORAL at 09:00

## 2024-11-07 NOTE — PLAN OF CARE
Problem: Prexisting or High Potential for Compromised Skin Integrity  Goal: Skin integrity is maintained or improved  Description: INTERVENTIONS:  - Identify patients at risk for skin breakdown  - Assess and monitor skin integrity  - Assess and monitor nutrition and hydration status  - Monitor labs   - Assess for incontinence   - Turn and reposition patient  - Assist with mobility/ambulation  - Relieve pressure over bony prominences  - Avoid friction and shearing  - Provide appropriate hygiene as needed including keeping skin clean and dry  - Evaluate need for skin moisturizer/barrier cream  - Collaborate with interdisciplinary team   - Patient/family teaching  - Consider wound care consult   Outcome: Progressing     Problem: Alteration in Thoughts and Perception  Goal: Treatment Goal: Gain control of psychotic behaviors/thinking, reduce/eliminate presenting symptoms and demonstrate improved reality functioning upon discharge  Outcome: Progressing  Goal: Verbalize thoughts and feelings  Description: Interventions:  - Promote a nonjudgmental and trusting relationship with the patient through active listening and therapeutic communication  - Assess patient's level of functioning, behavior and potential for risk  - Engage patient in 1 on 1 interactions  - Encourage patient to express fears, feelings, frustrations, and discuss symptoms    - [place] patient to reality, help patient recognize reality-based thinking   - Administer medications as ordered and assess for potential side effects  - Provide the patient education related to the signs and symptoms of the illness and desired effects of prescribed medications  Outcome: Progressing  Goal: Refrain from acting on delusional thinking/internal stimuli  Description: Interventions:  - Monitor patient closely, per order   - Utilize least restrictive measures   - Set reasonable limits, give positive feedback for acceptable   - Administer medications as ordered and monitor  of potential side effects  Outcome: Progressing  Goal: Agree to be compliant with medication regime, as prescribed and report medication side effects  Description: Interventions:  - Offer appropriate PRN medication and supervise ingestion; conduct AIMS, as needed   Outcome: Progressing  Goal: Attend and participate in unit activities, including therapeutic, recreational, and educational groups  Description: Interventions:  -Encourage Visitation and family involvement in care  Outcome: Progressing  Goal: Recognize dysfunctional thoughts, communicate reality-based thoughts at the time of discharge  Description: Interventions:  - Provide medication and psycho-education to assist patient in compliance and developing insight into his/her illness   Outcome: Progressing     Problem: Ineffective Coping  Goal: Demonstrates healthy coping skills  Outcome: Progressing  Goal: Participates in unit activities  Description: Interventions:  - Provide therapeutic environment   - Provide required programming   - Redirect inappropriate behaviors   Outcome: Progressing  Goal: Patient/Family participate in treatment and DC plans  Description: Interventions:  - Provide therapeutic environment  Outcome: Progressing  Goal: Understands least restrictive measures  Description: Interventions:  - Utilize least restrictive behavior  Outcome: Progressing  Goal: Free from restraint events  Description: - Utilize least restrictive measures   - Provide behavioral interventions   - Redirect inappropriate behaviors   Outcome: Progressing     Problem: Risk for Self Injury/Neglect  Goal: Verbalize thoughts and feelings  Description: Interventions:  - Assess and re-assess patient's lethality and potential for self-injury  - Engage patient in 1:1 interactions, daily, for a minimum of 15 minutes  - Encourage patient to express feelings, fears, frustrations, hopes  - Establish rapport/trust with patient   Outcome: Progressing  Goal: Refrain from harming  self  Description: Interventions:  - Monitor patient closely, per order  - Develop a trusting relationship  - Supervise medication ingestion, monitor effects and side effects   Outcome: Progressing  Goal: Attend and participate in unit activities, including therapeutic, recreational, and educational groups  Description: Interventions:  - Provide therapeutic and educational activities daily, encourage attendance and participation, and document same in the medical record  - Obtain collateral information, encourage visitation and family involvement in care   Outcome: Progressing     Problem: Depression  Goal: Verbalize thoughts and feelings  Description: Interventions:  - Assess and re-assess patient's level of risk   - Engage patient in 1:1 interactions, daily, for a minimum of 15 minutes   - Encourage patient to express feelings, fears, frustrations, hopes   Outcome: Progressing  Goal: Refrain from isolation  Description: Interventions:  - Develop a trusting relationship   - Encourage socialization   Outcome: Progressing  Goal: Refrain from self-neglect  Outcome: Progressing     Problem: Anxiety  Goal: Anxiety is at manageable level  Description: Interventions:  - Assess and monitor patient's anxiety level.   - Monitor for signs and symptoms (heart palpitations, chest pain, shortness of breath, headaches, nausea, feeling jumpy, restlessness, irritable, apprehensive).   - Collaborate with interdisciplinary team and initiate plan and interventions as ordered.  - [place] patient to unit/surroundings  - Explain treatment plan  - Encourage participation in care  - Encourage verbalization of concerns/fears  - Identify coping mechanisms  - Assist in developing anxiety-reducing skills  - Administer/offer alternative therapies  - Limit or eliminate stimulants  Outcome: Progressing

## 2024-11-07 NOTE — PROGRESS NOTES
11/07/24 0830   Team Meeting   Meeting Type Daily Rounds   Team Members Present   Team Members Present Physician;Nurse;;;Other (Discipline and Name)   Physician Team Member Dr Banuelos / Dr. Tyler / JAIR Ziegler   Nursing Team Member Clarissa / Dona / Nursing Students   Care Management Team Member Manju / Jacob / Tee   Social Work Team Member Ayse   Other (Discipline and Name) Marquez (Pharmacist) / Pharmacy Student   Patient/Family Present   Patient Present No   Patient's Family Present No       Treatment Team Rounds Completed  Medical and Psychiatric Review Completed  D/C: Pt is reported to be have low BP. Pt is reported to have attended a group. Pt is visible and anxious and pacing. Pt is tentatively scheduled to discharge on 11/29/2024.

## 2024-11-07 NOTE — PROGRESS NOTES
Progress Note - Behavioral Health   Name: Meli Nowak 59 y.o. female I MRN: [id number]  Unit/Bed#: [facility] 651-02 I Date of Admission: 7/23/2024   Date of Service: 11/7/2024 I Hospital Day: 107     Assessment & Plan  Schizoaffective disorder, bipolar type (HCC)  Continue Depakote to 1250 mg nightly  Most recent VPA level 54 on 10/12/24  CMP on 10/12/24 reviewed, WNL  Clozaril 900 mg nightly for schizoaffective disorder  Clozaril monitoring:  CRP, CBC/diff, CK QMon for monitoring  ANC 11/4/2024-3.30  VSS, without evidence of tachycardia  Most recent ECG reviewed 9/9/24 - normal ECG, NSR  Clozaril level 894 on 11/04/24 - Clozaril increased to 900 mg nightly on 10/29/24.  Senna to prevent Clozaril induced constipation  Discontinued-Risperdal on 10/28/24  Continue Ativan 0.5 mg twice daily for anxiety   Continue melatonin 3 mg nightly for insomnia  Continue prazosin 3 mg nightly for PTSD associated nightmares; doses to be adjusted as indicated   Continue trazodone 50 mg nightly for insomnia  Discharge disposition: CRR          Plan   Progress Toward Goals:   Meli is progressing towards goals of inpatient psychiatric treatment by continued medication compliance and is attending therapeutic modalities on the milieu. However, the patient continues to require inpatient psychiatric hospitalization for continued medication management and titration to optimize symptom reduction, improve sleep hygiene, and demonstrate adequate self-care.    Recommended Treatment: Treatment plan and medication changes discussed and per the attending physician the plan is:    1.Continue with group therapy, milieu therapy and occupational therapy  2.Behavioral Health checks every 7 minutes  3.Continue frequent safety checks and vitals per unit protocol  4.Continue with SLIM medical management as indicated  5.Continue with current medication regimen  6.Will review labs in the a.m.  7.Disposition Planning: Discharge planning and efforts remain  ongoing    Behavioral Health Medications: all current active meds have been reviewed and continue current psychiatric medications.  Current Facility-Administered Medications   Medication Dose Route Frequency Provider Last Rate    acetaminophen  650 mg Oral Q4H PRN Marie Ziegler, CRNP      acetaminophen  650 mg Oral Q4H PRN Marie Ziegler, CRNP      acetaminophen  975 mg Oral Q6H PRN Marie Ziegler, CRNP      aluminum-magnesium hydroxide-simethicone  30 mL Oral Q4H PRN Parris Bolanos, JOSE ELIAS      Artificial Tears  1 drop Both Eyes Q6H PRN Dereje Banuelos MD      atorvastatin  10 mg Oral Daily Marie Ziegler, CRNP      bisacodyl  10 mg Oral Daily PRN Kristen Logan, CRNP      bisacodyl  10 mg Rectal Daily PRN Marie Ziegler, CRNP      carvedilol  6.25 mg Oral BID With Meals Marie Ziegler, CRNP      cloZAPine  900 mg Oral HS Marie Ziegler, CRNP      cyanocobalamin  1,000 mcg Oral Daily Marie Ziegler, CRNP      Diclofenac Sodium  2 g Topical 4x Daily PRN Marie Ziegler, CRNP      famotidine  20 mg Oral BID Shan Fitzgerald, DO      fluticasone  1 puff Inhalation Daily Marie Ziegler, CRNP      hydrOXYzine HCL  25 mg Oral Q6H PRN Max 4/day Marie Ziegler, CRNP      hydrOXYzine HCL  50 mg Oral Q6H PRN Max 4/day Marie Ziegler, CHRISTOPHERNP      ibuprofen  600 mg Oral Once Marie Ziegler, CRNP      ipratropium-albuterol  3 mL Nebulization Q4H PRN Darin Lawton MD      LORazepam  0.5 mg Oral BID Marie Ziegler, JOSE ELIAS      Or    LORazepam  0.5 mg Intramuscular BID Marie Ziegler, CRNP      LORazepam  1 mg Intramuscular Q6H PRN Max 3/day Marie Ziegler, CRNP      LORazepam  1 mg Oral Q6H PRN Max 3/day Marie Ziegler, CHRISTOPHERNP      melatonin  3 mg Oral HS Marie Ziegler, CRNP      mirtazapine  7.5 mg Oral HS PRN Marie Ziegler, CHRISTOPHERNP      Multivitamin  15 mL Oral Daily Marie Ziegler, CRNP      nicotine  1 patch Transdermal Daily Marie Ziegler, CHRISTOPHERNP      OLANZapine  2.5 mg Oral Q6H PRN Dereje Banuelos,  MD      Or    OLANZapine  2.5 mg Intramuscular Q6H PRN Dereje Banuelos MD      OLANZapine  5 mg Oral Q6H PRN Dereje Banuelos MD      Or    OLANZapine  5 mg Intramuscular Q6H PRN Dereje Banuelos MD      ondansetron  4 mg Oral Q6H PRN Darin Lawton MD      polyethylene glycol  17 g Oral Daily Kristen Logan, CRVALE      polyethylene glycol  17 g Oral Daily PRN Marie Ziegler, JOSE ELIAS      prazosin  3 mg Oral HS Marie Ziegler, JOSE ELIAS      senna-docusate sodium  2 tablet Oral HS Rehana Borrego PA-C      traZODone  50 mg Oral HS Marie Ziegler, JOSE ELIAS      valproic acid  1,250 mg Oral HS JOSE ELIAS Figueroa         Risks / Benefits of Treatment:  Risks, benefits, and possible side effects of medications explained to patient and patient verbalizes understanding and agreement for treatment.       Subjective    Patient was seen today for continuation of care, records reviewed and patient was discussed with the morning case review team.    Meli was seen today for psychiatric follow-up.  On assessment today, Meli was still isolated and withdrawn. C/O generalized pain, patient reports that prn Tylenol is effective. AH chronic and ongoing, patient reports that they are positive. Mild depression reported with increasing anxiety especially during sleep. Will increase prazosin to 5 mg for PTSD nightmares and monitor for any adjustments as patient has had episodes of hypotension.  No medication change be made at this time.  Tentative discharge pending CRR placement.    Meli denies acute suicidal/self-harm ideation/intent/plan upon direct inquiry at this time.  Meli remains behaviorally appropriate, no agitation or aggression noted on exam or in report.   Impulse control is intact.  Meli remains adherent to her current psychotropic medication regimen and denies any side effects from medications, as well as none noted on exam.    Psychiatric Review of Systems:  Behavior over the last 24 hours:  unchanged.   Sleep:  interrupted, anxiety  Appetite: none  Medication side effects:   ROS: no complaints, denies shortness of breath or chest pain and all other systems are negative for acute changes        Objective    Vitals:  Vitals:    11/07/24 0725   BP: 124/58   Pulse: 76   Resp: 16   Temp: (!) 97.4 °F (36.3 °C)   SpO2: 94%       Laboratory Results:  I have personally reviewed all pertinent laboratory/tests results.  Most Recent Labs:   Lab Results   Component Value Date    WBC 8.31 11/04/2024    RBC 4.23 11/04/2024    HGB 13.1 11/04/2024    HCT 40.4 11/04/2024     11/04/2024    RDW 13.5 11/04/2024    NEUTROABS 3.30 11/04/2024    SODIUM 138 10/12/2024    K 4.1 10/12/2024     10/12/2024    CO2 29 10/12/2024    BUN 18 10/12/2024    CREATININE 0.97 10/12/2024    GLUC 146 (H) 10/12/2024    GLUF 98 08/10/2024    CALCIUM 9.2 10/12/2024    AST 12 (L) 10/12/2024    ALT 12 10/12/2024    ALKPHOS 84 10/12/2024    TP 6.7 10/12/2024    ALB 3.9 10/12/2024    TBILI 0.26 10/12/2024    VALPROICTOT 54 10/12/2024    AMMONIA 32 07/03/2024    [id number] 2.000 07/24/2024    HGBA1C 6.4 (H) 05/03/2024     05/03/2024       Mental Status Evaluation:  Appearance:  disheveled, improved grooming   Behavior:  cooperative, calm, guarded   Speech:  normal rate and volume   Mood:  less anxious, less depressed   Affect:  constricted   Thought Process:  perseverative, concrete, more logical   Thought Content:  Judaism and somatic preoccupation, paranoid ideation   Perceptual Disturbances: auditory hallucinations still present, but less frequent   Risk Potential: Suicidal ideation - None  Homicidal ideation - None  Potential for aggression - No   Memory:  recent and remote memory grossly intact   Sensorium  person, place, and time/date      Consciousness:  alert and awake   Attention: attention span and concentration are age appropriate   Insight:  limited   Judgment: limited   Gait/Station: normal gait/station   Motor Activity: no abnormal  movements       Counseling / Coordination of Care:  Patient's progress reviewed with nursing staff.  Medications, treatment progress and treatment plan reviewed with patient.  Supportive counseling provided to the patient.    This note was completed in part utilizing Dragon dictation Software. Grammatical, translation, syntax errors, random word insertions, spelling mistakes, and incomplete sentences may be an occasional consequence of this system secondary to software limitations with voice recognition, ambient noise, and hardware issues. If you have any questions or concerns about the content, text, or information contained within the body of this dictation, please contact the provider for clarification

## 2024-11-07 NOTE — NURSING NOTE
"PRN tylenol not effective. Reports BLE pain is still 9/10. Reports concerns of \"ending up in a w/c.\"  "

## 2024-11-07 NOTE — NURSING NOTE
Patient c/o 9/10 BLE pain. States this pain has been ongoing. No edema observed. Denies pins and needles sensation. Denies restlessness. PRN tylenol 975mg administered at 1113. Results pending.

## 2024-11-07 NOTE — PROGRESS NOTES
11/07/24 1330   Activity/Group Checklist   Group Life Skills  (topic of life balance)   Attendance Attended   Attendance Duration (min) 16-30   Interactions Other (Comment)  (Pt. entered group with radio in hand and did not remain in full session,upon leaving pt. apologized stating that she would stay longer tomorrow.)   Affect/Mood Wide   Goals Achieved Able to listen to others

## 2024-11-07 NOTE — NURSING NOTE
Patient is bizarre, suspicious, and uninterested. Withdrawn in the morning. Refused breakfast. Ate 100% of lunch. Hygiene appears fair. Medication compliant. Skipping groups r/t leg pain. No behavioral issues. Continuous rounding maintained.

## 2024-11-08 PROCEDURE — 99232 SBSQ HOSP IP/OBS MODERATE 35: CPT | Performed by: STUDENT IN AN ORGANIZED HEALTH CARE EDUCATION/TRAINING PROGRAM

## 2024-11-08 RX ADMIN — FAMOTIDINE 20 MG: 20 TABLET ORAL at 08:58

## 2024-11-08 RX ADMIN — FLUTICASONE FUROATE 1 PUFF: 100 POWDER RESPIRATORY (INHALATION) at 08:58

## 2024-11-08 RX ADMIN — CLOZAPINE 900 MG: 200 TABLET ORAL at 21:34

## 2024-11-08 RX ADMIN — POLYETHYLENE GLYCOL 3350 17 G: 17 POWDER, FOR SOLUTION ORAL at 08:59

## 2024-11-08 RX ADMIN — PRAZOSIN HYDROCHLORIDE 5 MG: 5 CAPSULE ORAL at 21:34

## 2024-11-08 RX ADMIN — LORAZEPAM 0.5 MG: 0.5 TABLET ORAL at 08:58

## 2024-11-08 RX ADMIN — FAMOTIDINE 20 MG: 20 TABLET ORAL at 17:14

## 2024-11-08 RX ADMIN — LORAZEPAM 0.5 MG: 0.5 TABLET ORAL at 17:14

## 2024-11-08 RX ADMIN — SENNOSIDES AND DOCUSATE SODIUM 2 TABLET: 8.6; 5 TABLET ORAL at 21:34

## 2024-11-08 RX ADMIN — CARVEDILOL 6.25 MG: 6.25 TABLET, FILM COATED ORAL at 17:14

## 2024-11-08 RX ADMIN — VALPROIC ACID 1250 MG: 500 SOLUTION ORAL at 21:41

## 2024-11-08 RX ADMIN — TRAZODONE HYDROCHLORIDE 50 MG: 50 TABLET ORAL at 21:34

## 2024-11-08 RX ADMIN — ATORVASTATIN CALCIUM 10 MG: 10 TABLET, FILM COATED ORAL at 08:58

## 2024-11-08 RX ADMIN — MELATONIN TAB 3 MG 3 MG: 3 TAB at 21:34

## 2024-11-08 RX ADMIN — CYANOCOBALAMIN TAB 1000 MCG 1000 MCG: 1000 TAB at 08:58

## 2024-11-08 RX ADMIN — Medication 15 ML: at 08:58

## 2024-11-08 NOTE — NURSING NOTE
Meli is isolative to her room. Medication compliant. Endorses anxiety and depression. Emotional support and reassurance provided often. Will continue to monitor and support during treatment.

## 2024-11-08 NOTE — PROGRESS NOTES
Progress Note - Behavioral Health   Name: Meli Nowak 59 y.o. female I MRN: [id number]  Unit/Bed#: [facility] 651-02 I Date of Admission: 7/23/2024   Date of Service: 11/8/2024 I Hospital Day: 108     Assessment & Plan  Schizoaffective disorder, bipolar type (HCC)   Depakote to 1250 mg nightly  Most recent VPA level 54 on 10/12/24  CMP on 10/12/24 reviewed, WNL  Clozaril 900 mg nightly for schizoaffective disorder  Clozaril monitoring:  CRP, CBC/diff, CK QMon for monitoring  ANC 11/4/2024-3.30  VSS, without evidence of tachycardia  Most recent ECG reviewed 9/9/24 - normal ECG, NSR  Clozaril level 894 on 11/04/24 - Clozaril increased to 900 mg nightly on 10/29/24.  Senna to prevent Clozaril induced constipation  Discontinued-Risperdal on 10/28/24  Continue Ativan 0.5 mg twice daily for anxiety   Continue melatonin 3 mg nightly for insomnia  Continue prazosin 5 mg nightly for PTSD associated nightmares; doses to be adjusted as indicated   Continue trazodone 50 mg nightly for insomnia  Discharge disposition: CRR          Plan   Progress Toward Goals:   Meli is progressing towards goals of inpatient psychiatric treatment by continued medication compliance and is attending therapeutic modalities on the milieu. However, the patient continues to require inpatient psychiatric hospitalization for continued medication management and titration to optimize symptom reduction, improve sleep hygiene, and demonstrate adequate self-care.    Recommended Treatment: Treatment plan and medication changes discussed and per the attending physician the plan is:    1.Continue with group therapy, milieu therapy and occupational therapy  2.Behavioral Health checks every 7 minutes  3.Continue frequent safety checks and vitals per unit protocol  4.Continue with SLIM medical management as indicated  5.Continue with current medication regimen  6.Will review labs in the a.m.  7.Disposition Planning: Discharge planning and efforts remain  ongoing    Behavioral Health Medications: all current active meds have been reviewed and continue current psychiatric medications.  Current Facility-Administered Medications   Medication Dose Route Frequency Provider Last Rate    acetaminophen  650 mg Oral Q4H PRN Marie Ziegler, CRNP      acetaminophen  650 mg Oral Q4H PRN Marie Ziegler, CRNP      acetaminophen  975 mg Oral Q6H PRN Marie Ziegler, CRNP      aluminum-magnesium hydroxide-simethicone  30 mL Oral Q4H PRN Parris Bolanos, JOSE ELIAS      Artificial Tears  1 drop Both Eyes Q6H PRN Dereje Banuelos MD      atorvastatin  10 mg Oral Daily Marie Ziegler, CRNP      bisacodyl  10 mg Oral Daily PRN Kristen Logan, CRNP      bisacodyl  10 mg Rectal Daily PRN Marie Ziegler, CRNP      carvedilol  6.25 mg Oral BID With Meals Marie Ziegler, CRNP      cloZAPine  900 mg Oral HS Marie Ziegler, CRNP      cyanocobalamin  1,000 mcg Oral Daily Marie Ziegler, CRNP      Diclofenac Sodium  2 g Topical 4x Daily PRN Marie Ziegler, CRNP      famotidine  20 mg Oral BID Shan Fitzgerald, DO      fluticasone  1 puff Inhalation Daily Marie Ziegler, CRNP      hydrOXYzine HCL  25 mg Oral Q6H PRN Max 4/day Marie Ziegler, CRNP      hydrOXYzine HCL  50 mg Oral Q6H PRN Max 4/day Marie Ziegler, CHRISTOPHERNP      ibuprofen  600 mg Oral Once Marie Ziegler, CRNP      ipratropium-albuterol  3 mL Nebulization Q4H PRN Darin Lawton MD      LORazepam  0.5 mg Oral BID Marie Ziegler, JOSE ELIAS      Or    LORazepam  0.5 mg Intramuscular BID Marie Ziegler, CRNP      LORazepam  1 mg Intramuscular Q6H PRN Max 3/day Marie Ziegler, CRNP      LORazepam  1 mg Oral Q6H PRN Max 3/day Marie Ziegler, CHRISTOPHERNP      melatonin  3 mg Oral HS Marie Ziegler, CRNP      mirtazapine  7.5 mg Oral HS PRN Marie Ziegler, CHRISTOPHERNP      Multivitamin  15 mL Oral Daily Marie Ziegler, CRNP      nicotine  1 patch Transdermal Daily Marie Ziegler, CHRISTOPHERNP      OLANZapine  2.5 mg Oral Q6H PRN Dereje Banuelos,  MD      Or    OLANZapine  2.5 mg Intramuscular Q6H PRN Dereje Banuelos MD      OLANZapine  5 mg Oral Q6H PRN Dereje Banuelos MD      Or    OLANZapine  5 mg Intramuscular Q6H PRN Dereje Banuelos MD      ondansetron  4 mg Oral Q6H PRN Darin Lawton MD      polyethylene glycol  17 g Oral Daily Kristen Padillajosias, JOSE ELIAS      polyethylene glycol  17 g Oral Daily PRN JOSE ELIAS Figueroa      prazosin  5 mg Oral HS JOSE ELIAS Figueroa      senna-docusate sodium  2 tablet Oral HS Rehana Borrego PA-C      traZODone  50 mg Oral HS JOSE ELIAS Figueroa      valproic acid  1,250 mg Oral HS JOSE ELIAS Figueroa         Risks / Benefits of Treatment:  Risks, benefits, and possible side effects of medications explained to patient and patient verbalizes understanding and agreement for treatment.       Subjective    Patient was seen today for continuation of care, records reviewed and patient was discussed with the morning case review team.    Meli was seen today for psychiatric follow-up.  On assessment today, Meli was lying in bed but was seen later on the milieu. Somatically preoccupied, patient is requesting a rescue inhaler that she can use overnight. Nightly VS taken, O2 sat 98. Patient is instructed to use coping skills to manage symptoms. Mild depression and anxiety still endorsed, patient also more internally preoccupied although she is currently denying AVH.   Clozaril to continue at 900 mg nightly, and Depakene at 1250 mg nightly( VPA 54). Patient remains paranoid at times with bizarre appearance. No medication changes. Tentative discharge pending placement.     Meli denies acute suicidal/self-harm ideation/intent/plan upon direct inquiry at this time.  Meli remains behaviorally appropriate, no agitation or aggression noted on exam or in report.  Meli also denies HI/AH/VH, and does not appear overtly manic.  No overt delusions or paranoia are verbalized. Impulse control is intact.  Meli remains adherent to  her current psychotropic medication regimen and denies any side effects from medications, as well as none noted on exam.    Psychiatric Review of Systems:  Behavior over the last 24 hours:  unchanged.   Sleep: improving  Appetite: good  Medication side effects:   ROS: no complaints, denies shortness of breath or chest pain and all other systems are negative for acute changes        Objective    Vitals:  Vitals:    11/08/24 0731   BP: 95/54   Pulse: 78   Resp: 15   Temp: (!) 97.2 °F (36.2 °C)   SpO2: 99%       Laboratory Results:  I have personally reviewed all pertinent laboratory/tests results.  Most Recent Labs:   Lab Results   Component Value Date    WBC 8.31 11/04/2024    RBC 4.23 11/04/2024    HGB 13.1 11/04/2024    HCT 40.4 11/04/2024     11/04/2024    RDW 13.5 11/04/2024    NEUTROABS 3.30 11/04/2024    SODIUM 138 10/12/2024    K 4.1 10/12/2024     10/12/2024    CO2 29 10/12/2024    BUN 18 10/12/2024    CREATININE 0.97 10/12/2024    GLUC 146 (H) 10/12/2024    GLUF 98 08/10/2024    CALCIUM 9.2 10/12/2024    AST 12 (L) 10/12/2024    ALT 12 10/12/2024    ALKPHOS 84 10/12/2024    TP 6.7 10/12/2024    ALB 3.9 10/12/2024    TBILI 0.26 10/12/2024    VALPROICTOT 54 10/12/2024    AMMONIA 32 07/03/2024    [id number] 2.000 07/24/2024    HGBA1C 6.4 (H) 05/03/2024     05/03/2024       Mental Status Evaluation:  Appearance:  disheveled, improved grooming   Behavior:  cooperative, calm, bizarre   Speech:  normal rate and volume   Mood:  less anxious, less depressed   Affect:  constricted   Thought Process:  perseverative, concrete   Thought Content:  somatic preoccupation, paranoid ideation   Perceptual Disturbances: appears responding to internal stimuli, denies when asked, but talks to self at times   Risk Potential: Suicidal ideation - None  Homicidal ideation - None  Potential for aggression - No   Memory:  recent and remote memory grossly intact   Sensorium  person, place, and time/date       Consciousness:  alert and awake   Attention: attention span and concentration are age appropriate   Insight:  limited   Judgment: limited   Gait/Station: normal gait/station   Motor Activity: no abnormal movements       Counseling / Coordination of Care:  Patient's progress reviewed with nursing staff.  Medications, treatment progress and treatment plan reviewed with patient.  Supportive counseling provided to the patient.    This note was completed in part utilizing Dragon dictation Software. Grammatical, translation, syntax errors, random word insertions, spelling mistakes, and incomplete sentences may be an occasional consequence of this system secondary to software limitations with voice recognition, ambient noise, and hardware issues. If you have any questions or concerns about the content, text, or information contained within the body of this dictation, please contact the provider for clarification

## 2024-11-08 NOTE — NURSING NOTE
"Patient approached nurse's station, appearing anxious. Patient quickly became bright stating \"I'm fine!\" Patient denied SI/HI/AVH. Patient did appear internally preoccupied, but denied this. Patient's gait remains steady. Patient able to and encouraged to inform staff of any needs.   "

## 2024-11-08 NOTE — PLAN OF CARE
Problem: Prexisting or High Potential for Compromised Skin Integrity  Goal: Skin integrity is maintained or improved  Description: INTERVENTIONS:  - Identify patients at risk for skin breakdown  - Assess and monitor skin integrity  - Assess and monitor nutrition and hydration status  - Monitor labs   - Assess for incontinence   - Turn and reposition patient  - Assist with mobility/ambulation  - Relieve pressure over bony prominences  - Avoid friction and shearing  - Provide appropriate hygiene as needed including keeping skin clean and dry  - Evaluate need for skin moisturizer/barrier cream  - Collaborate with interdisciplinary team   - Patient/family teaching  - Consider wound care consult   Outcome: Progressing     Problem: Alteration in Thoughts and Perception  Goal: Verbalize thoughts and feelings  Description: Interventions:  - Promote a nonjudgmental and trusting relationship with the patient through active listening and therapeutic communication  - Assess patient's level of functioning, behavior and potential for risk  - Engage patient in 1 on 1 interactions  - Encourage patient to express fears, feelings, frustrations, and discuss symptoms    - [place] patient to reality, help patient recognize reality-based thinking   - Administer medications as ordered and assess for potential side effects  - Provide the patient education related to the signs and symptoms of the illness and desired effects of prescribed medications  Outcome: Progressing  Goal: Refrain from acting on delusional thinking/internal stimuli  Description: Interventions:  - Monitor patient closely, per order   - Utilize least restrictive measures   - Set reasonable limits, give positive feedback for acceptable   - Administer medications as ordered and monitor of potential side effects  Outcome: Progressing  Goal: Complete daily ADLs, including personal hygiene independently, as able  Description: Interventions:  - Observe, teach, and assist  patient with ADLS  - Monitor and promote a balance of rest/activity, with adequate nutrition and elimination   Outcome: Progressing     Problem: Ineffective Coping  Goal: Participates in unit activities  Description: Interventions:  - Provide therapeutic environment   - Provide required programming   - Redirect inappropriate behaviors   Outcome: Progressing  Goal: Free from restraint events  Description: - Utilize least restrictive measures   - Provide behavioral interventions   - Redirect inappropriate behaviors   Outcome: Progressing     Problem: Risk for Self Injury/Neglect  Goal: Refrain from harming self  Description: Interventions:  - Monitor patient closely, per order  - Develop a trusting relationship  - Supervise medication ingestion, monitor effects and side effects   Outcome: Progressing     Problem: Depression  Goal: Treatment Goal: Demonstrate behavioral control of depressive symptoms, verbalize feelings of improved mood/affect, and adopt new coping skills prior to discharge  Outcome: Progressing     Problem: Anxiety  Goal: Anxiety is at manageable level  Description: Interventions:  - Assess and monitor patient's anxiety level.   - Monitor for signs and symptoms (heart palpitations, chest pain, shortness of breath, headaches, nausea, feeling jumpy, restlessness, irritable, apprehensive).   - Collaborate with interdisciplinary team and initiate plan and interventions as ordered.  - [place] patient to unit/surroundings  - Explain treatment plan  - Encourage participation in care  - Encourage verbalization of concerns/fears  - Identify coping mechanisms  - Assist in developing anxiety-reducing skills  - Administer/offer alternative therapies  - Limit or eliminate stimulants  Outcome: Progressing     Problem: Potential for Falls  Goal: Patient will remain free of falls  Description: INTERVENTIONS:  - Educate patient on patient safety including physical limitations  - Instruct patient to call for assistance  with activity   - Consult OT/PT to assist with strengthening/mobility   - Keep Call bell within reach  - Keep bed low and locked with side rails adjusted as appropriate  - Keep care items and personal belongings within reach  - Initiate and maintain comfort rounds  - Offer Toileting every 2 Hours, in advance of need  - Initiate/Maintain bed and chair alarm  - Obtain necessary fall risk management equipment: walker, wheelchair  - Apply yellow socks and bracelet for high fall risk patients  - Patient moved to room near nurses station  Outcome: Progressing

## 2024-11-08 NOTE — NURSING NOTE
Patient awake and c/o needing oxygen. No distress noted. PO- 95% HR 68 and RR 18. Emotional support provided and HOB elevated. Will monitor.

## 2024-11-08 NOTE — CASE MANAGEMENT
Pt's BCM worker Conchita Hall present on unit. Conchita reports [employer] will not cont payment on pt's apt. Conchita reports being in contact with apt manager. CM informed by Conchita that there is no paperwork for CM to fill out at this time. Apt manager is caring for pt's cat. Conchita reports pt is improving and would like to see if pt might be able to return to apt vs CRR.

## 2024-11-08 NOTE — ASSESSMENT & PLAN NOTE
Depakote to 1250 mg nightly  Most recent VPA level 54 on 10/12/24  CMP on 10/12/24 reviewed, WNL  Clozaril 900 mg nightly for schizoaffective disorder  Clozaril monitoring:  CRP, CBC/diff, CK QMon for monitoring  ANC 11/4/2024-3.30  VSS, without evidence of tachycardia  Most recent ECG reviewed 9/9/24 - normal ECG, NSR  Clozaril level 894 on 11/04/24 - Clozaril increased to 900 mg nightly on 10/29/24.  Senna to prevent Clozaril induced constipation  Discontinued-Risperdal on 10/28/24  Continue Ativan 0.5 mg twice daily for anxiety   Continue melatonin 3 mg nightly for insomnia  Continue prazosin 5 mg nightly for PTSD associated nightmares; doses to be adjusted as indicated   Continue trazodone 50 mg nightly for insomnia  Discharge disposition: CRR

## 2024-11-09 PROCEDURE — 99232 SBSQ HOSP IP/OBS MODERATE 35: CPT | Performed by: STUDENT IN AN ORGANIZED HEALTH CARE EDUCATION/TRAINING PROGRAM

## 2024-11-09 RX ADMIN — Medication 15 ML: at 08:55

## 2024-11-09 RX ADMIN — HYDROXYZINE HYDROCHLORIDE 50 MG: 50 TABLET, FILM COATED ORAL at 13:11

## 2024-11-09 RX ADMIN — FAMOTIDINE 20 MG: 20 TABLET ORAL at 08:55

## 2024-11-09 RX ADMIN — CARVEDILOL 6.25 MG: 6.25 TABLET, FILM COATED ORAL at 17:06

## 2024-11-09 RX ADMIN — LORAZEPAM 0.5 MG: 0.5 TABLET ORAL at 08:55

## 2024-11-09 RX ADMIN — MELATONIN TAB 3 MG 3 MG: 3 TAB at 21:29

## 2024-11-09 RX ADMIN — SENNOSIDES AND DOCUSATE SODIUM 2 TABLET: 8.6; 5 TABLET ORAL at 21:29

## 2024-11-09 RX ADMIN — VALPROIC ACID 1250 MG: 500 SOLUTION ORAL at 21:29

## 2024-11-09 RX ADMIN — FLUTICASONE FUROATE 1 PUFF: 100 POWDER RESPIRATORY (INHALATION) at 09:03

## 2024-11-09 RX ADMIN — CYANOCOBALAMIN TAB 1000 MCG 1000 MCG: 1000 TAB at 08:56

## 2024-11-09 RX ADMIN — LORAZEPAM 0.5 MG: 0.5 TABLET ORAL at 17:06

## 2024-11-09 RX ADMIN — FAMOTIDINE 20 MG: 20 TABLET ORAL at 17:06

## 2024-11-09 RX ADMIN — CARVEDILOL 6.25 MG: 6.25 TABLET, FILM COATED ORAL at 08:55

## 2024-11-09 RX ADMIN — POLYETHYLENE GLYCOL 3350 17 G: 17 POWDER, FOR SOLUTION ORAL at 08:56

## 2024-11-09 RX ADMIN — PRAZOSIN HYDROCHLORIDE 5 MG: 5 CAPSULE ORAL at 21:29

## 2024-11-09 RX ADMIN — TRAZODONE HYDROCHLORIDE 50 MG: 50 TABLET ORAL at 21:29

## 2024-11-09 RX ADMIN — ATORVASTATIN CALCIUM 10 MG: 10 TABLET, FILM COATED ORAL at 08:56

## 2024-11-09 RX ADMIN — CLOZAPINE 900 MG: 200 TABLET ORAL at 21:29

## 2024-11-09 NOTE — NURSING NOTE
Patient is alert and withdrawn to room, visible during meals and snack time. Pleasant and able to make needs known. Denies all psych s/s. Medication compliant and cooperative with care. Safety precautions maintained.

## 2024-11-09 NOTE — ASSESSMENT & PLAN NOTE
Depakote to 1250 mg nightly  Most recent VPA level 54 on 10/12/24  CMP on 10/12/24 reviewed, WNL  Clozaril 900 mg nightly for schizoaffective disorder  Clozaril monitoring:  CRP, CBC/diff, CK QMon for monitoring  ANC 11/4/2024-3.30  VSS, without evidence of tachycardia  Most recent ECG reviewed 9/9/24 - normal ECG, NSR  Clozaril increased to 900 mg nightly on 10/29/24 - Clozaril level 894 on 11/04/24   Senna to prevent Clozaril induced constipation  Discontinued-Risperdal on 10/28/24  Continue Ativan 0.5 mg twice daily for anxiety   Continue melatonin 3 mg nightly for insomnia  Continue prazosin 5 mg nightly for PTSD associated nightmares; doses to be adjusted as indicated   Continue trazodone 50 mg nightly for insomnia  Discharge disposition: CRR

## 2024-11-09 NOTE — TREATMENT TEAM
11/08/24 0830   Team Meeting   Meeting Type Daily Rounds   Team Members Present   Team Members Present Physician;Nurse;;   Physician Team Member Dr. Banuelos / Dr. Tyler / JAIR Ziegler   Nursing Team Member Keith   Care Management Team Member Manju / Tee   Social Work Team Member Ayse   Patient/Family Present   Patient Present No   Patient's Family Present No       Treatment Team Rounds Completed  Medical and Psychiatric Review Completed  D/C: Pt attended one group. Pt reports feeling anxious at night. Pt complains of shortness of breath though no medical evidence to suggest shortness of breath. Pt is tentatively scheduled to discharge on 11/29/2024.

## 2024-11-09 NOTE — TREATMENT TEAM
"   11/09/24 1312   Powell Anxiety Scale   Anxious Mood 2   Tension 2   Fears 2   Insomnia 0   Intellectual 2   Depressed Mood 2   Somatic Complaints: Muscular 2   Somatic Complaints: Sensory 2   Cardiovascular Symptoms 2   Respiratory Symptoms 2   Gastrointestinal Symptoms 2   Genitourinary Symptoms 0   Autonomic Symptoms 2   Behavior at Interview 2   Powell Anxiety Score 24     PRN Atarax 50 mg given for complaints of anxiety pt said \" I want to stay in group and the anxiety is causing me to have SOB and I can't concentrate\".   "

## 2024-11-09 NOTE — NURSING NOTE
"Patient visible throughout the unit and medication complaint. During morning medication administration patient stated \" I'm not honest with the doctor, my anxiety usually starts increasing at 2AM\". Charge nurse made patient aware that she must be honest with the provider and to make nurses on shift aware of when her anxiety increases. Patient was participating in group when she approached the nurses station and stated she was having a panic attack and feeling extremely anxious. PRN Atarax was administered at 13:11. Patient denies any further c/o or concerns and expressed no psych s/s.   "

## 2024-11-09 NOTE — PROGRESS NOTES
Progress Note - Behavioral Health   Name: Meli Nowak 59 y.o. female I MRN: [id number]  Unit/Bed#: [facility] 651-02 I Date of Admission: 7/23/2024   Date of Service: 11/9/2024 I Hospital Day: 109     Assessment & Plan  Schizoaffective disorder, bipolar type (HCC)   Depakote to 1250 mg nightly  Most recent VPA level 54 on 10/12/24  CMP on 10/12/24 reviewed, WNL  Clozaril 900 mg nightly for schizoaffective disorder  Clozaril monitoring:  CRP, CBC/diff, CK QMon for monitoring  ANC 11/4/2024-3.30  VSS, without evidence of tachycardia  Most recent ECG reviewed 9/9/24 - normal ECG, NSR  Clozaril increased to 900 mg nightly on 10/29/24 - Clozaril level 894 on 11/04/24   Senna to prevent Clozaril induced constipation  Discontinued-Risperdal on 10/28/24  Continue Ativan 0.5 mg twice daily for anxiety   Continue melatonin 3 mg nightly for insomnia  Continue prazosin 5 mg nightly for PTSD associated nightmares; doses to be adjusted as indicated   Continue trazodone 50 mg nightly for insomnia  Discharge disposition: CRR          Plan   Progress Toward Goals: mood is stabilizing, discharge planning, placement pending    Recommended Treatment:    - Encourage early mobility and having a structured day  - Provide frequent re-orientation, and cognitive stimulation  - Ensure assistive devices are in proper working order (eye-glasses, hearing aids)  - Encourage adequate hydration, nutrition and monitor bowel movements  - Maintain sleep-wake cycle: Uninterrupted sleep time; low-level lighting at night  - Fall precaution  - f/u SLIM recs regarding the medical problems   - Continue medication titration and treatment plan; adjust medication to optimize treatment response and as clinically indicated. .     Current medications:  Current Facility-Administered Medications   Medication Dose Route Frequency Provider Last Rate    acetaminophen  650 mg Oral Q4H PRN JOSE ELIAS Figueroa      acetaminophen  650 mg Oral Q4H PRN JOSE ELIAS Figueroa       acetaminophen  975 mg Oral Q6H PRN Marie Ziegler, CRNP      aluminum-magnesium hydroxide-simethicone  30 mL Oral Q4H PRN Parris Bolanos, CRNP      Artificial Tears  1 drop Both Eyes Q6H PRN Dereje Banuelos MD      atorvastatin  10 mg Oral Daily Marie Ziegler, CRNP      bisacodyl  10 mg Oral Daily PRN Kristen Logan, CRNP      bisacodyl  10 mg Rectal Daily PRN Marie Ziegler, CRNP      carvedilol  6.25 mg Oral BID With Meals Marie Ziegler, CRNP      cloZAPine  900 mg Oral HS Marie Ziegler, CRNP      cyanocobalamin  1,000 mcg Oral Daily Marie Ziegler, CRNP      Diclofenac Sodium  2 g Topical 4x Daily PRN Marie Ziegler, CRNP      famotidine  20 mg Oral BID Shan Fitzgerald, DO      fluticasone  1 puff Inhalation Daily Marie Ziegler, CRNP      hydrOXYzine HCL  25 mg Oral Q6H PRN Max 4/day Marie Ziegler, CRNP      hydrOXYzine HCL  50 mg Oral Q6H PRN Max 4/day Marie Ziegler, CRNP      ibuprofen  600 mg Oral Once Marie Ziegler, CRNP      ipratropium-albuterol  3 mL Nebulization Q4H PRN Darin Lawton MD      LORazepam  0.5 mg Oral BID Marie Ziegler, CRNP      Or    LORazepam  0.5 mg Intramuscular BID Marie Ziegler, CRNP      LORazepam  1 mg Intramuscular Q6H PRN Max 3/day Marie Ziegler, CRNP      LORazepam  1 mg Oral Q6H PRN Max 3/day Marie Ziegler, CRNP      melatonin  3 mg Oral HS Marie Ziegler, CRNP      mirtazapine  7.5 mg Oral HS PRN Marie Ziegler, CRNP      Multivitamin  15 mL Oral Daily Marie Ziegler, CRNP      nicotine  1 patch Transdermal Daily Marie Ziegler, CRNP      OLANZapine  2.5 mg Oral Q6H PRN Dereje Banuelos MD      Or    OLANZapine  2.5 mg Intramuscular Q6H PRN Dereje Banuelos MD      OLANZapine  5 mg Oral Q6H PRN eDreje Banuelos MD      Or    OLANZapine  5 mg Intramuscular Q6H PRN Dereje Banuelos MD      ondansetron  4 mg Oral Q6H PRN Darin Lawton MD      polyethylene glycol  17 g Oral Daily Kristen Logan, CRNP      polyethylene glycol  17  g Oral Daily PRN JOSE ELIAS Figueroa      prazosin  5 mg Oral HS Marie JOSE ELIAS Ziegler      senna-docusate sodium  2 tablet Oral HS Rehana Borrego PA-C      traZODone  50 mg Oral HS Marie JOSE ELIAS Ziegler      valproic acid  1,250 mg Oral HS Marie JOSE ELIAS Ziegler          - Observation: routine            - VS: as per unit protocol  - Encourage group attendance and milieu therapy  - Dispo: To be determined      Subjective     Patient was visited on unit for continuing care; chart reviewed and discussed with multidisciplinary treatment team.  On approach, the patient was calm, pleasant and cooperative. The patient initially reported good mood and denied anxiety sxs, but later admitted feeling anxious and depressed at times, referred to nightmares she had last week and anxiety sxs few days ago with negative thinking and ruminations. Remains preoccupied with somatic sxs. Denied any changes in appetite, and energy level. No problem initiating and maintaining sleep.  Denied A/VH currently.  Denied SI/HI, intent or plan upon direct inquiry at this time.    Patient continues to be intermittently visible in the milieu and interacts with select staff and peers. No reports of aggression or self-injurious behavior on unit. No PRN medications used in the past 24 hours.    Patient accepted all offered medications and no adverse effects of medications noted or reported. . Clozaril increased to 900 mg nightly on 10/29/24 and the level was 894 on 11/4/24. The patient has to remain in the hospital while awaiting placement as their mental illness does not allow for them to be treated at a lower level of care. Case management is assertively/actively working on securing the necessary level of care.     Objective    Current Mental Status Evaluation:  Appearance and attitude: appeared as stated age, dressed in hospital attire, with fair hygiene  Eye contact: good  Motor Function: within normal limits, intact gait, No  "PMA/PMR  Gait/station: normal gait/station and normal balance  Speech: normal for rate, rhythm, volume, latency, amount  Language: No overt abnormality  Mood/affect: \"good\" / Affect was constricted but reactive, mood congruent  Thought Processes: ruminating  Thought content: denied suicidal ideations or homicidal ideations, no overt delusions elicited, somatic preoccupation, negative thinking, ruminations  Associations: concrete associations, perseverative  Perceptual disturbances: denies Auditory/Visual/Tactile Hallucinations  Orientation: oriented to time, person, place and to the situational context  Cognitive Function: intact  Memory: recent and remote memory grossly intact  Fund of knowledge: aware of current events, aware of past history, and vocabulary average  Impulse control: good  Insight/judgment: limited/limited          Vital signs in last 24 hours:    Temp:  [97.5 °F (36.4 °C)-98 °F (36.7 °C)] 98 °F (36.7 °C)  HR:  [71-94] 81  BP: (114-134)/(56-65) 117/63  Resp:  [16] 16  SpO2:  [92 %-95 %] 92 %  O2 Device: None (Room air)      Lab results: I have personally reviewed all pertinent laboratory/tests results      Counseling / Coordination of Care  Patient's progress reviewed with nursing staff.  Medications, treatment progress and treatment plan reviewed with patient.  Medication education provided to patient.  Supportive therapy provided to patient.  Cognitive techniques utilized during the session.  Reassurance and supportive therapy provided.  Reoriented to reality and reassured.  Encouraged participation in milieu and group therapy on the unit.  Crisis/safety plan discussed with patient.       Dereje Banuelos MD  Attending Psychiatrist   [facility]       This note was completed in part utilizing Dragon dictation Software. Grammatical, translation, syntax errors, random word insertions, spelling mistakes, and incomplete sentences may be an occasional consequence of this system " secondary to software limitations with voice recognition, ambient noise, and hardware issues. If you have any questions or concerns about the content, text, or information contained within the body of this dictation, please contact the provider for clarification.

## 2024-11-10 PROBLEM — G47.33 OSA (OBSTRUCTIVE SLEEP APNEA): Status: ACTIVE | Noted: 2024-11-10

## 2024-11-10 PROCEDURE — 99232 SBSQ HOSP IP/OBS MODERATE 35: CPT | Performed by: STUDENT IN AN ORGANIZED HEALTH CARE EDUCATION/TRAINING PROGRAM

## 2024-11-10 RX ADMIN — Medication 15 ML: at 09:13

## 2024-11-10 RX ADMIN — CLOZAPINE 900 MG: 200 TABLET ORAL at 21:20

## 2024-11-10 RX ADMIN — CARVEDILOL 6.25 MG: 6.25 TABLET, FILM COATED ORAL at 17:09

## 2024-11-10 RX ADMIN — LORAZEPAM 0.5 MG: 0.5 TABLET ORAL at 09:13

## 2024-11-10 RX ADMIN — FAMOTIDINE 20 MG: 20 TABLET ORAL at 17:09

## 2024-11-10 RX ADMIN — CARVEDILOL 6.25 MG: 6.25 TABLET, FILM COATED ORAL at 09:13

## 2024-11-10 RX ADMIN — CYANOCOBALAMIN TAB 1000 MCG 1000 MCG: 1000 TAB at 09:13

## 2024-11-10 RX ADMIN — LORAZEPAM 0.5 MG: 0.5 TABLET ORAL at 17:09

## 2024-11-10 RX ADMIN — SENNOSIDES AND DOCUSATE SODIUM 2 TABLET: 8.6; 5 TABLET ORAL at 21:20

## 2024-11-10 RX ADMIN — MELATONIN TAB 3 MG 3 MG: 3 TAB at 21:20

## 2024-11-10 RX ADMIN — TRAZODONE HYDROCHLORIDE 50 MG: 50 TABLET ORAL at 21:20

## 2024-11-10 RX ADMIN — ATORVASTATIN CALCIUM 10 MG: 10 TABLET, FILM COATED ORAL at 09:13

## 2024-11-10 RX ADMIN — POLYETHYLENE GLYCOL 3350 17 G: 17 POWDER, FOR SOLUTION ORAL at 09:13

## 2024-11-10 RX ADMIN — FLUTICASONE FUROATE 1 PUFF: 100 POWDER RESPIRATORY (INHALATION) at 09:12

## 2024-11-10 RX ADMIN — FAMOTIDINE 20 MG: 20 TABLET ORAL at 09:13

## 2024-11-10 RX ADMIN — PRAZOSIN HYDROCHLORIDE 5 MG: 5 CAPSULE ORAL at 21:20

## 2024-11-10 RX ADMIN — VALPROIC ACID 1250 MG: 500 SOLUTION ORAL at 21:19

## 2024-11-10 NOTE — PROGRESS NOTES
Progress Note - Behavioral Health   Name: Meli Nowak 59 y.o. female I MRN: [id number]  Unit/Bed#: [facility] 651-02 I Date of Admission: 7/23/2024   Date of Service: 11/10/2024 I Hospital Day: 110     Assessment & Plan  Schizoaffective disorder, bipolar type (HCC)   Depakote to 1250 mg nightly  Most recent VPA level 54 on 10/12/24  CMP on 10/12/24 reviewed, WNL  Clozaril 900 mg nightly for schizoaffective disorder  Clozaril monitoring:  CRP, CBC/diff, CK QMon for monitoring  ANC 11/4/2024-3.30  VSS, without evidence of tachycardia  Most recent ECG reviewed 9/9/24 - normal ECG, NSR  Clozaril increased to 900 mg nightly on 10/29/24 - Clozaril level 894 on 11/04/24   Senna to prevent Clozaril induced constipation  Discontinued-Risperdal on 10/28/24  Continue Ativan 0.5 mg twice daily for anxiety   Continue melatonin 3 mg nightly for insomnia  Continue prazosin 5 mg nightly for PTSD associated nightmares; doses to be adjusted as indicated   Continue trazodone 50 mg nightly for insomnia  Discharge disposition: CRR      ASHLIE (obstructive sleep apnea)  - f/u w/ Sleep Medicine in outpatient setting and consider CPAP as indicated      Plan   Progress Toward Goals: working on coping skills, discharge planning, placement pending, poor insight, poor motivation, reality testing remains poor    Recommended Treatment:    - Encourage early mobility and having a structured day  - Provide frequent re-orientation, and cognitive stimulation  - Ensure assistive devices are in proper working order (eye-glasses, hearing aids)  - Encourage adequate hydration, nutrition and monitor bowel movements  - Maintain sleep-wake cycle: Uninterrupted sleep time; low-level lighting at night  - Fall precaution  - f/u SLIM recs regarding the medical problems   - Continue medication titration and treatment plan; adjust medication to optimize treatment response and as clinically indicated. .     Current medications:  Current Facility-Administered Medications    Medication Dose Route Frequency Provider Last Rate    acetaminophen  650 mg Oral Q4H PRN Marie Ziegler, CRNP      acetaminophen  650 mg Oral Q4H PRN Marie Ziegler, CRNP      acetaminophen  975 mg Oral Q6H PRN Marie Ziegler, CRNP      aluminum-magnesium hydroxide-simethicone  30 mL Oral Q4H PRN Parris Bolanos, CRNP      Artificial Tears  1 drop Both Eyes Q6H PRN Dereje Banuelos MD      atorvastatin  10 mg Oral Daily Marie Ziegler, CRNP      bisacodyl  10 mg Oral Daily PRN Kristen Logan, CRNP      bisacodyl  10 mg Rectal Daily PRN Marie Ziegler, CRNP      carvedilol  6.25 mg Oral BID With Meals Marie Ziegler, CRNP      cloZAPine  900 mg Oral HS Marie Ziegler, CRNP      cyanocobalamin  1,000 mcg Oral Daily Marie Ziegler, CRNP      Diclofenac Sodium  2 g Topical 4x Daily PRN Marie Ziegler, CRNP      famotidine  20 mg Oral BID Shan Fitzgerald DO      fluticasone  1 puff Inhalation Daily Marie Zieglre, CRNP      hydrOXYzine HCL  25 mg Oral Q6H PRN Max 4/day Marie Ziegler, CRNP      hydrOXYzine HCL  50 mg Oral Q6H PRN Max 4/day Marie Ziegler, CRNP      ibuprofen  600 mg Oral Once Marie Zieglre, CRNP      ipratropium-albuterol  3 mL Nebulization Q4H PRN Darin Lawton MD      LORazepam  0.5 mg Oral BID Marie Ziegler, CRNP      Or    LORazepam  0.5 mg Intramuscular BID Marie Ziegler, CRNP      LORazepam  1 mg Intramuscular Q6H PRN Max 3/day Marie Ziegler, CRNP      LORazepam  1 mg Oral Q6H PRN Max 3/day Marie Ziegler, CRNP      melatonin  3 mg Oral HS Marie Ziegler, CRNP      mirtazapine  7.5 mg Oral HS PRN Marie Ziegler, CRNP      Multivitamin  15 mL Oral Daily Marie Ziegler, CRNP      OLANZapine  2.5 mg Oral Q6H PRN Dereje Banuelos MD      Or    OLANZapine  2.5 mg Intramuscular Q6H PRN Dereje Banuelos MD      OLANZapine  5 mg Oral Q6H PRN Dereje Banuelos MD      Or    OLANZapine  5 mg Intramuscular Q6H PRN Dereje Banuelos MD      ondansetron  4 mg Oral Q6H PRN Darin  TIANA Lawton MD      polyethylene glycol  17 g Oral Daily Kritsenabdoulaye TomasJOSE ELIAS Weller      polyethylene glycol  17 g Oral Daily PRN JOSE ELIAS Figueroa      prazosin  5 mg Oral HS JOSE ELIAS Figueroa      senna-docusate sodium  2 tablet Oral HS Rehana Borrego PA-C      traZODone  50 mg Oral HS JOSE ELIAS Figueroa      valproic acid  1,250 mg Oral HS JOSE ELIAS Figueroa          - Observation: routine            - VS: as per unit protocol  - Encourage group attendance and milieu therapy  - Dispo: To be determined      Subjective     Patient was visited on unit for continuing care; chart reviewed and discussed with multidisciplinary treatment team.  On approach, the patient was calm and cooperative. Denied any changes in mood, appetite, and energy level. She continues to ruminate on going home with her cat and missing her cat, preoccupied with somatic sxs, with limited insight and judgement. She reported being diagnosed with ASHLIE and reportedly used CPAP in the past but not recently. She reported interrupted sleep and waking up with SOB at times, but no problem initiating and maintaining sleep last night. Denied A/VH currently.  Denied SI/HI, intent or plan upon direct inquiry at this time.    Patient continues to be intermittently visible in the milieu and interacts with select staff and peers. No reports of aggression or self-injurious behavior on unit.  Received Atarax PRN around 1 PM yesterday for anxiety.    Patient accepted all offered medications and no adverse effects of medications noted or reported. Clozaril increased to 900 mg nightly on 10/29/24 and the level was 894 on 11/4/24. The patient has to remain in the hospital while awaiting placement as their mental illness does not allow for them to be treated at a lower level of care. Case management is assertively/actively working on securing the necessary level of care.    Objective    Current Mental Status Evaluation:  Appearance and attitude:  "appeared as stated age, dressed in hospital attire, with fair hygiene  Eye contact: good  Motor Function: within normal limits, No PMA/PMR  Gait/station: Not observed  Speech: talking in soft tone with normal latency and decreased amount  Language: No overt abnormality  Mood/affect:  \"anxious\"  / Affect was constricted but reactive, mood congruent  Thought Processes: ruminating  Thought content: denied suicidal ideations or homicidal ideations, no overt delusions elicited, somatic preoccupation, negative thinking, ruminations  Associations: concrete associations, perseverative  Perceptual disturbances: denies Auditory/Visual/Tactile Hallucinations  Orientation: oriented to time, person, place and to the situational context  Cognitive Function: intact  Memory: not cooperative with formal MMSE  Fund of knowledge: aware of current events, aware of past history, and vocabulary average  Impulse control: good  Insight/judgment: limited/limited          Vital signs in last 24 hours:    Temp:  [98.4 °F (36.9 °C)-100.1 °F (37.8 °C)] 98.6 °F (37 °C)  HR:  [78-98] 83  BP: (112-125)/(56-64) 120/56  Resp:  [16-18] 17  SpO2:  [94 %-96 %] 96 %  O2 Device: None (Room air)      Lab results: I have personally reviewed all pertinent laboratory/tests results      Counseling / Coordination of Care  Patient's progress reviewed with nursing staff.  Medications, treatment progress and treatment plan reviewed with patient.  Medication education provided to patient.  Supportive therapy provided to patient.  Cognitive techniques utilized during the session.  Reassurance and supportive therapy provided.  Reoriented to reality and reassured.  Encouraged participation in milieu and group therapy on the unit.  Crisis/safety plan discussed with patient.  Discharge plan discussed with patient.       Dereje Banuelos MD  Attending Psychiatrist   [facility]       This note was completed in part utilizing Dragon dictation " Software. Grammatical, translation, syntax errors, random word insertions, spelling mistakes, and incomplete sentences may be an occasional consequence of this system secondary to software limitations with voice recognition, ambient noise, and hardware issues. If you have any questions or concerns about the content, text, or information contained within the body of this dictation, please contact the provider for clarification.

## 2024-11-10 NOTE — PLAN OF CARE
Problem: Alteration in Thoughts and Perception  Goal: Treatment Goal: Gain control of psychotic behaviors/thinking, reduce/eliminate presenting symptoms and demonstrate improved reality functioning upon discharge  Outcome: Progressing  Goal: Verbalize thoughts and feelings  Description: Interventions:  - Promote a nonjudgmental and trusting relationship with the patient through active listening and therapeutic communication  - Assess patient's level of functioning, behavior and potential for risk  - Engage patient in 1 on 1 interactions  - Encourage patient to express fears, feelings, frustrations, and discuss symptoms    - [place] patient to reality, help patient recognize reality-based thinking   - Administer medications as ordered and assess for potential side effects  - Provide the patient education related to the signs and symptoms of the illness and desired effects of prescribed medications  Outcome: Progressing  Goal: Refrain from acting on delusional thinking/internal stimuli  Description: Interventions:  - Monitor patient closely, per order   - Utilize least restrictive measures   - Set reasonable limits, give positive feedback for acceptable   - Administer medications as ordered and monitor of potential side effects  Outcome: Progressing  Goal: Agree to be compliant with medication regime, as prescribed and report medication side effects  Description: Interventions:  - Offer appropriate PRN medication and supervise ingestion; conduct AIMS, as needed   Outcome: Progressing  Goal: Recognize dysfunctional thoughts, communicate reality-based thoughts at the time of discharge  Description: Interventions:  - Provide medication and psycho-education to assist patient in compliance and developing insight into his/her illness   Outcome: Progressing  Goal: Complete daily ADLs, including personal hygiene independently, as able  Description: Interventions:  - Observe, teach, and assist patient with ADLS  - Monitor and  promote a balance of rest/activity, with adequate nutrition and elimination   Outcome: Progressing     Problem: Risk for Self Injury/Neglect  Goal: Treatment Goal: Remain safe during length of stay, learn and adopt new coping skills, and be free of self-injurious ideation, impulses and acts at the time of discharge  Outcome: Progressing  Goal: Verbalize thoughts and feelings  Description: Interventions:  - Assess and re-assess patient's lethality and potential for self-injury  - Engage patient in 1:1 interactions, daily, for a minimum of 15 minutes  - Encourage patient to express feelings, fears, frustrations, hopes  - Establish rapport/trust with patient   Outcome: Progressing  Goal: Refrain from harming self  Description: Interventions:  - Monitor patient closely, per order  - Develop a trusting relationship  - Supervise medication ingestion, monitor effects and side effects   Outcome: Progressing  Goal: Recognize maladaptive responses and adopt new coping mechanisms  Outcome: Progressing     Problem: Depression  Goal: Treatment Goal: Demonstrate behavioral control of depressive symptoms, verbalize feelings of improved mood/affect, and adopt new coping skills prior to discharge  Outcome: Progressing  Goal: Verbalize thoughts and feelings  Description: Interventions:  - Assess and re-assess patient's level of risk   - Engage patient in 1:1 interactions, daily, for a minimum of 15 minutes   - Encourage patient to express feelings, fears, frustrations, hopes   Outcome: Progressing  Goal: Refrain from isolation  Description: Interventions:  - Develop a trusting relationship   - Encourage socialization   Outcome: Progressing  Goal: Refrain from self-neglect  Outcome: Progressing     Problem: Anxiety  Goal: Anxiety is at manageable level  Description: Interventions:  - Assess and monitor patient's anxiety level.   - Monitor for signs and symptoms (heart palpitations, chest pain, shortness of breath, headaches, nausea,  feeling jumpy, restlessness, irritable, apprehensive).   - Collaborate with interdisciplinary team and initiate plan and interventions as ordered.  - [place] patient to unit/surroundings  - Explain treatment plan  - Encourage participation in care  - Encourage verbalization of concerns/fears  - Identify coping mechanisms  - Assist in developing anxiety-reducing skills  - Administer/offer alternative therapies  - Limit or eliminate stimulants  Outcome: Progressing

## 2024-11-10 NOTE — NURSING NOTE
Patient was visible on the unit and withdrawn to her room occasionally. Patient was taking naps throughout the day and denied any psych s/s. Patient did not express any c/o or concerns and was medication complaint on this shift. Provider D/C nicotine patch due to patients continual refusal.

## 2024-11-10 NOTE — NURSING NOTE
"Patient is calm and cooperative with care. Patient denies all psych s/s. Patient states \"I feel much better today.\" Medication compliant. Patient is withdrawn to room during shift. Patient encouraged to make needs known. Safety checks ongoing.  "

## 2024-11-11 LAB
BASOPHILS # BLD AUTO: 0.1 THOUSANDS/ΜL (ref 0–0.1)
BASOPHILS NFR BLD AUTO: 1 % (ref 0–1)
CK SERPL-CCNC: 31 U/L (ref 26–192)
CRP SERPL QL: 1 MG/L
EOSINOPHIL # BLD AUTO: 0 THOUSAND/ΜL (ref 0–0.61)
EOSINOPHIL NFR BLD AUTO: 0 % (ref 0–6)
ERYTHROCYTE [DISTWIDTH] IN BLOOD BY AUTOMATED COUNT: 13.6 % (ref 11.6–15.1)
HCT VFR BLD AUTO: 44.1 % (ref 34.8–46.1)
HGB BLD-MCNC: 14 G/DL (ref 11.5–15.4)
IMM GRANULOCYTES # BLD AUTO: 0.03 THOUSAND/UL (ref 0–0.2)
IMM GRANULOCYTES NFR BLD AUTO: 0 % (ref 0–2)
LYMPHOCYTES # BLD AUTO: 4.17 THOUSANDS/ΜL (ref 0.6–4.47)
LYMPHOCYTES NFR BLD AUTO: 46 % (ref 14–44)
MCH RBC QN AUTO: 31.5 PG (ref 26.8–34.3)
MCHC RBC AUTO-ENTMCNC: 31.7 G/DL (ref 31.4–37.4)
MCV RBC AUTO: 99 FL (ref 82–98)
MONOCYTES # BLD AUTO: 0.85 THOUSAND/ΜL (ref 0.17–1.22)
MONOCYTES NFR BLD AUTO: 9 % (ref 4–12)
NEUTROPHILS # BLD AUTO: 4.1 THOUSANDS/ΜL (ref 1.85–7.62)
NEUTS SEG NFR BLD AUTO: 44 % (ref 43–75)
NRBC BLD AUTO-RTO: 0 /100 WBCS
PLATELET # BLD AUTO: 224 THOUSANDS/UL (ref 149–390)
PMV BLD AUTO: 9.9 FL (ref 8.9–12.7)
RBC # BLD AUTO: 4.44 MILLION/UL (ref 3.81–5.12)
WBC # BLD AUTO: 9.25 THOUSAND/UL (ref 4.31–10.16)

## 2024-11-11 PROCEDURE — 86140 C-REACTIVE PROTEIN: CPT

## 2024-11-11 PROCEDURE — 85025 COMPLETE CBC W/AUTO DIFF WBC: CPT

## 2024-11-11 PROCEDURE — 99232 SBSQ HOSP IP/OBS MODERATE 35: CPT | Performed by: STUDENT IN AN ORGANIZED HEALTH CARE EDUCATION/TRAINING PROGRAM

## 2024-11-11 PROCEDURE — 82550 ASSAY OF CK (CPK): CPT

## 2024-11-11 RX ORDER — ALBUTEROL SULFATE 90 UG/1
2 INHALANT RESPIRATORY (INHALATION) EVERY 4 HOURS PRN
Status: DISCONTINUED | OUTPATIENT
Start: 2024-11-11 | End: 2024-12-30 | Stop reason: HOSPADM

## 2024-11-11 RX ADMIN — VALPROIC ACID 1250 MG: 500 SOLUTION ORAL at 21:16

## 2024-11-11 RX ADMIN — FLUTICASONE FUROATE 1 PUFF: 100 POWDER RESPIRATORY (INHALATION) at 08:36

## 2024-11-11 RX ADMIN — ATORVASTATIN CALCIUM 10 MG: 10 TABLET, FILM COATED ORAL at 08:34

## 2024-11-11 RX ADMIN — MELATONIN TAB 3 MG 3 MG: 3 TAB at 21:16

## 2024-11-11 RX ADMIN — FAMOTIDINE 20 MG: 20 TABLET ORAL at 17:06

## 2024-11-11 RX ADMIN — SENNOSIDES AND DOCUSATE SODIUM 2 TABLET: 8.6; 5 TABLET ORAL at 21:16

## 2024-11-11 RX ADMIN — ALBUTEROL SULFATE 2 PUFF: 90 AEROSOL, METERED RESPIRATORY (INHALATION) at 17:15

## 2024-11-11 RX ADMIN — POLYETHYLENE GLYCOL 3350 17 G: 17 POWDER, FOR SOLUTION ORAL at 08:35

## 2024-11-11 RX ADMIN — CARVEDILOL 6.25 MG: 6.25 TABLET, FILM COATED ORAL at 08:34

## 2024-11-11 RX ADMIN — TRAZODONE HYDROCHLORIDE 50 MG: 50 TABLET ORAL at 21:16

## 2024-11-11 RX ADMIN — FAMOTIDINE 20 MG: 20 TABLET ORAL at 08:34

## 2024-11-11 RX ADMIN — CARVEDILOL 6.25 MG: 6.25 TABLET, FILM COATED ORAL at 17:06

## 2024-11-11 RX ADMIN — CLOZAPINE 900 MG: 200 TABLET ORAL at 21:15

## 2024-11-11 RX ADMIN — CYANOCOBALAMIN TAB 1000 MCG 1000 MCG: 1000 TAB at 08:34

## 2024-11-11 RX ADMIN — Medication 15 ML: at 08:34

## 2024-11-11 RX ADMIN — LORAZEPAM 0.5 MG: 0.5 TABLET ORAL at 08:34

## 2024-11-11 RX ADMIN — PRAZOSIN HYDROCHLORIDE 5 MG: 5 CAPSULE ORAL at 21:16

## 2024-11-11 RX ADMIN — LORAZEPAM 0.5 MG: 0.5 TABLET ORAL at 17:06

## 2024-11-11 NOTE — PLAN OF CARE
Problem: Alteration in Thoughts and Perception  Goal: Treatment Goal: Gain control of psychotic behaviors/thinking, reduce/eliminate presenting symptoms and demonstrate improved reality functioning upon discharge  Outcome: Progressing  Goal: Verbalize thoughts and feelings  Description: Interventions:  - Promote a nonjudgmental and trusting relationship with the patient through active listening and therapeutic communication  - Assess patient's level of functioning, behavior and potential for risk  - Engage patient in 1 on 1 interactions  - Encourage patient to express fears, feelings, frustrations, and discuss symptoms    - [place] patient to reality, help patient recognize reality-based thinking   - Administer medications as ordered and assess for potential side effects  - Provide the patient education related to the signs and symptoms of the illness and desired effects of prescribed medications  Outcome: Progressing  Goal: Refrain from acting on delusional thinking/internal stimuli  Description: Interventions:  - Monitor patient closely, per order   - Utilize least restrictive measures   - Set reasonable limits, give positive feedback for acceptable   - Administer medications as ordered and monitor of potential side effects  Outcome: Progressing  Goal: Agree to be compliant with medication regime, as prescribed and report medication side effects  Description: Interventions:  - Offer appropriate PRN medication and supervise ingestion; conduct AIMS, as needed   Outcome: Progressing  Goal: Recognize dysfunctional thoughts, communicate reality-based thoughts at the time of discharge  Description: Interventions:  - Provide medication and psycho-education to assist patient in compliance and developing insight into his/her illness   Outcome: Progressing  Goal: Complete daily ADLs, including personal hygiene independently, as able  Description: Interventions:  - Observe, teach, and assist patient with ADLS  - Monitor and  promote a balance of rest/activity, with adequate nutrition and elimination   Outcome: Progressing     Problem: Risk for Self Injury/Neglect  Goal: Treatment Goal: Remain safe during length of stay, learn and adopt new coping skills, and be free of self-injurious ideation, impulses and acts at the time of discharge  Outcome: Progressing  Goal: Verbalize thoughts and feelings  Description: Interventions:  - Assess and re-assess patient's lethality and potential for self-injury  - Engage patient in 1:1 interactions, daily, for a minimum of 15 minutes  - Encourage patient to express feelings, fears, frustrations, hopes  - Establish rapport/trust with patient   Outcome: Progressing  Goal: Refrain from harming self  Description: Interventions:  - Monitor patient closely, per order  - Develop a trusting relationship  - Supervise medication ingestion, monitor effects and side effects   Outcome: Progressing  Goal: Recognize maladaptive responses and adopt new coping mechanisms  Outcome: Progressing     Problem: Depression  Goal: Treatment Goal: Demonstrate behavioral control of depressive symptoms, verbalize feelings of improved mood/affect, and adopt new coping skills prior to discharge  Outcome: Progressing  Goal: Verbalize thoughts and feelings  Description: Interventions:  - Assess and re-assess patient's level of risk   - Engage patient in 1:1 interactions, daily, for a minimum of 15 minutes   - Encourage patient to express feelings, fears, frustrations, hopes   Outcome: Progressing  Goal: Refrain from isolation  Description: Interventions:  - Develop a trusting relationship   - Encourage socialization   Outcome: Progressing  Goal: Refrain from self-neglect  Outcome: Progressing     Problem: Anxiety  Goal: Anxiety is at manageable level  Description: Interventions:  - Assess and monitor patient's anxiety level.   - Monitor for signs and symptoms (heart palpitations, chest pain, shortness of breath, headaches, nausea,  feeling jumpy, restlessness, irritable, apprehensive).   - Collaborate with interdisciplinary team and initiate plan and interventions as ordered.  - [place] patient to unit/surroundings  - Explain treatment plan  - Encourage participation in care  - Encourage verbalization of concerns/fears  - Identify coping mechanisms  - Assist in developing anxiety-reducing skills  - Administer/offer alternative therapies  - Limit or eliminate stimulants  Outcome: Progressing

## 2024-11-11 NOTE — TREATMENT TEAM
11/11/24 5780   Team Meeting   Meeting Type Daily Rounds   Team Members Present   Team Members Present Physician;Nurse;;;Occupational Therapist   Physician Team Member Dr. Banuelos / Dr. Hurst / Dr. Bland / JAIR Ziegler   Nursing Team Member Keith   Care Management Team Member Manju / Tee   Social Work Team Member Ayse IRENE Team Member Eduardo   Patient/Family Present   Patient Present No   Patient's Family Present No       Treatment Team Rounds Completed  Medical and Psychiatric Review Completed  D/C: Pt is reported to be withdrawn. Pt is reported to have been exercising to help with leg pain.

## 2024-11-11 NOTE — NURSING NOTE
Patient cooperative with care. Denies all psych s/s. Patient is withdrawn to room listening to the radio. Medication compliant. Patient encouraged to make needs known. Safety checks ongoing.

## 2024-11-11 NOTE — ASSESSMENT & PLAN NOTE
Depakote to 1250 mg nightly  Most recent VPA level 54 on 10/12/24  CMP on 10/12/24 reviewed, WNL  Clozaril 900 mg nightly for schizoaffective disorder  Clozaril monitoring:  CRP, CBC/diff, CK QMon for monitoring  ANC 11/11/2024-4.10  VSS, without evidence of tachycardia  Most recent ECG reviewed 9/9/24 - normal ECG, NSR  Clozaril increased to 900 mg nightly on 10/29/24 - Clozaril level 894 on 11/04/24   Senna to prevent Clozaril induced constipation  Discontinued-Risperdal on 10/28/24  Continue Ativan 0.5 mg twice daily for anxiety   Continue melatonin 3 mg nightly for insomnia  Continue prazosin 5 mg nightly for PTSD associated nightmares; doses to be adjusted as indicated   Continue trazodone 50 mg nightly for insomnia  Discharge disposition: CRR

## 2024-11-11 NOTE — PROGRESS NOTES
Progress Note - Behavioral Health   Name: Meli Nowak 59 y.o. female I MRN: [id number]  Unit/Bed#: [facility] 651-02 I Date of Admission: 7/23/2024   Date of Service: 11/11/2024 I Hospital Day: 111     Assessment & Plan  Schizoaffective disorder, bipolar type (HCC)   Depakote to 1250 mg nightly  Most recent VPA level 54 on 10/12/24  CMP on 10/12/24 reviewed, WNL  Clozaril 900 mg nightly for schizoaffective disorder  Clozaril monitoring:  CRP, CBC/diff, CK QMon for monitoring  ANC 11/11/2024-4.10  VSS, without evidence of tachycardia  Most recent ECG reviewed 9/9/24 - normal ECG, NSR  Clozaril increased to 900 mg nightly on 10/29/24 - Clozaril level 894 on 11/04/24   Senna to prevent Clozaril induced constipation  Discontinued-Risperdal on 10/28/24  Continue Ativan 0.5 mg twice daily for anxiety   Continue melatonin 3 mg nightly for insomnia  Continue prazosin 5 mg nightly for PTSD associated nightmares; doses to be adjusted as indicated   Continue trazodone 50 mg nightly for insomnia  Discharge disposition: CRR      ASHLIE (obstructive sleep apnea)  - f/u w/ Sleep Medicine in outpatient setting and consider CPAP as indicated      Plan   Progress Toward Goals:   Meli is progressing towards goals of inpatient psychiatric treatment by continued medication compliance and is attending therapeutic modalities on the milieu. However, the patient continues to require inpatient psychiatric hospitalization for continued medication management and titration to optimize symptom reduction, improve sleep hygiene, and demonstrate adequate self-care.    Recommended Treatment: Treatment plan and medication changes discussed and per the attending physician the plan is:    1.Continue with group therapy, milieu therapy and occupational therapy  2.Behavioral Health checks every 7 minutes  3.Continue frequent safety checks and vitals per unit protocol  4.Continue with SLIM medical management as indicated  5.Continue with current medication  regimen  6.Will review labs in the a.m.  7.Disposition Planning: Discharge planning and efforts remain ongoing    Behavioral Health Medications: all current active meds have been reviewed and continue current psychiatric medications.  Current Facility-Administered Medications   Medication Dose Route Frequency Provider Last Rate    acetaminophen  650 mg Oral Q4H PRN Marie Ziegler, CRNP      acetaminophen  650 mg Oral Q4H PRN Marie Ziegler, CRNP      acetaminophen  975 mg Oral Q6H PRN Marie Ziegler, CRNP      albuterol  2 puff Inhalation Q4H PRN Kristen Logan, CRNP      aluminum-magnesium hydroxide-simethicone  30 mL Oral Q4H PRN Parris Bolanos, CRNP      Artificial Tears  1 drop Both Eyes Q6H PRN Dereje Banuelos MD      atorvastatin  10 mg Oral Daily Marie Ziegler, CRNP      bisacodyl  10 mg Oral Daily PRN Kristen Logan, CRNP      bisacodyl  10 mg Rectal Daily PRN Marie Ziegler, CRNP      carvedilol  6.25 mg Oral BID With Meals Marie Ziegler, CRNP      cloZAPine  900 mg Oral HS Marie Ziegler, CRNP      cyanocobalamin  1,000 mcg Oral Daily Marie Ziegler, CRNP      Diclofenac Sodium  2 g Topical 4x Daily PRN Marie Ziegler, CRNP      famotidine  20 mg Oral BID Shan Fitzgerald, DO      fluticasone  1 puff Inhalation Daily Marie Ziegler, CRNP      hydrOXYzine HCL  25 mg Oral Q6H PRN Max 4/day Marie Ziegler, CRNP      hydrOXYzine HCL  50 mg Oral Q6H PRN Max 4/day Marie Ziegler, CRNP      ibuprofen  600 mg Oral Once Marie Ziegler, CRNP      LORazepam  0.5 mg Oral BID Marie Ziegler, CRNP      Or    LORazepam  0.5 mg Intramuscular BID Marie Ziegler, CRNP      LORazepam  1 mg Intramuscular Q6H PRN Max 3/day Marie Ziegler, CRNP      LORazepam  1 mg Oral Q6H PRN Max 3/day Marie Ziegler, CHRISTOPHERNP      melatonin  3 mg Oral HS Marie Ziegler, CRNP      mirtazapine  7.5 mg Oral HS PRN Marie Ziegler, CRNP      Multivitamin  15 mL Oral Daily Marie Ziegler, CRNP      OLANZapine   2.5 mg Oral Q6H PRN Dereje Banuelos MD      Or    OLANZapine  2.5 mg Intramuscular Q6H PRN Dereje Banuelos MD      OLANZapine  5 mg Oral Q6H PRN Dereje Banuelos MD      Or    OLANZapine  5 mg Intramuscular Q6H PRN Dereje Banuelos MD      ondansetron  4 mg Oral Q6H PRN Darin Lawton MD      polyethylene glycol  17 g Oral Daily JOSE ELIAS Ortega      polyethylene glycol  17 g Oral Daily PRN JOSE ELIAS Figueroa      prazosin  5 mg Oral HS JOSE ELIAS Figueroa      senna-docusate sodium  2 tablet Oral HS Rehana Borrego PA-C      traZODone  50 mg Oral HS JOSE ELIAS Figueroa      valproic acid  1,250 mg Oral HS JOSE ELIAS Figueroa         Risks / Benefits of Treatment:  Risks, benefits, and possible side effects of medications explained to patient and patient verbalizes understanding and agreement for treatment.       Subjective    Patient was seen today for continuation of care, records reviewed and patient was discussed with the morning case review team.    Meli was seen today for psychiatric follow-up.  On assessment today, Meli was smiling and laughing.  Still using the radio to drown out the voices, patient reports that they are getting better.  No changes in sleep or appetite, patient does remain somatic at times but is easily redirectable.  CBC CK and CRP obtained this morning, ANC 4.10.  We will not make any medication changes at this time as patient is slowly approaching her baseline..     Meli denies acute suicidal/self-harm ideation/intent/plan upon direct inquiry at this time.  Meli remains behaviorally appropriate, no agitation or aggression noted on exam or in report.  Meli also denies HI/AH/VH, and does not appear overtly manic.  No overt delusions or paranoia are verbalized. Impulse control is intact.  Meli remains adherent to her current psychotropic medication regimen and denies any side effects from medications, as well as none noted on exam.    Psychiatric Review of  Systems:  Behavior over the last 24 hours:  unchanged.   Sleep: good  Appetite: good  Medication side effects: none  ROS: no complaints, denies shortness of breath or chest pain and all other systems are negative for acute changes        Objective    Vitals:  Vitals:    11/11/24 0734   BP: 152/99   Pulse: 72   Resp: 17   Temp: 98.2 °F (36.8 °C)   SpO2: 95%       Laboratory Results:  I have personally reviewed all pertinent laboratory/tests results.  Most Recent Labs:   Lab Results   Component Value Date    WBC 9.25 11/11/2024    RBC 4.44 11/11/2024    HGB 14.0 11/11/2024    HCT 44.1 11/11/2024     11/11/2024    RDW 13.6 11/11/2024    NEUTROABS 4.10 11/11/2024    SODIUM 138 10/12/2024    K 4.1 10/12/2024     10/12/2024    CO2 29 10/12/2024    BUN 18 10/12/2024    CREATININE 0.97 10/12/2024    GLUC 146 (H) 10/12/2024    GLUF 98 08/10/2024    CALCIUM 9.2 10/12/2024    AST 12 (L) 10/12/2024    ALT 12 10/12/2024    ALKPHOS 84 10/12/2024    TP 6.7 10/12/2024    ALB 3.9 10/12/2024    TBILI 0.26 10/12/2024    VALPROICTOT 54 10/12/2024    AMMONIA 32 07/03/2024    LMC8SYATAALT 2.000 07/24/2024    HGBA1C 6.4 (H) 05/03/2024     05/03/2024       Mental Status Evaluation:  Appearance:  disheveled, improved grooming   Behavior:  cooperative, calm   Speech:  normal rate and volume   Mood:  less anxious, less depressed   Affect:  constricted   Thought Process:  perseverative, concrete   Thought Content:  somatic preoccupation, paranoid ideation   Perceptual Disturbances: auditory hallucinations still present, but less frequent   Risk Potential: Suicidal ideation - None  Homicidal ideation - None  Potential for aggression - No   Memory:  recent and remote memory grossly intact   Sensorium  person, place, and time/date      Consciousness:  alert and awake   Attention: attention span and concentration are age appropriate   Insight:  limited   Judgment: limited   Gait/Station: normal gait/station   Motor Activity: no  abnormal movements       Counseling / Coordination of Care:  Patient's progress reviewed with nursing staff.  Medications, treatment progress and treatment plan reviewed with patient.  Supportive counseling provided to the patient.    This note was completed in part utilizing Dragon dictation Software. Grammatical, translation, syntax errors, random word insertions, spelling mistakes, and incomplete sentences may be an occasional consequence of this system secondary to software limitations with voice recognition, ambient noise, and hardware issues. If you have any questions or concerns about the content, text, or information contained within the body of this dictation, please contact the provider for clarification

## 2024-11-11 NOTE — PROGRESS NOTES
11/11/24 1000 11/11/24 1100 11/11/24 1330   Activity/Group Checklist   Group Community meeting Admission/Discharge Other (Comment)  (Community support, [facility], Crisis, 988 and information)   Attendance Did not attend Other (Comment)  (not able) Attended;Other (Comment)  (left early)   Attendance Duration (min)  --   --  31-45   Interactions  --   --  Other (Comment)  (restless)   Affect/Mood  --   --  Other (Comment)  (anxious)   Goals Achieved  --   --  Identified feelings;Identified triggers;Identified relapse prevention strategies;Discussed discharge plans;Discussed coping strategies;Able to listen to others;Able to engage in interactions      11/11/24 1415   Activity/Group Checklist   Group Other (Comment)  (Mindfulness)   Attendance Did not attend   Attendance Duration (min)  --    Interactions  --    Affect/Mood  --    Goals Achieved  --

## 2024-11-11 NOTE — NURSING NOTE
Patient withdrawn to her room, only out for meals. She is cooperative and compliant with medications. She denies depression, anxiety, SI and AVH. Q15 mins checks maintained.

## 2024-11-12 PROCEDURE — 99232 SBSQ HOSP IP/OBS MODERATE 35: CPT | Performed by: STUDENT IN AN ORGANIZED HEALTH CARE EDUCATION/TRAINING PROGRAM

## 2024-11-12 RX ADMIN — VALPROIC ACID 1250 MG: 500 SOLUTION ORAL at 21:14

## 2024-11-12 RX ADMIN — MELATONIN TAB 3 MG 3 MG: 3 TAB at 21:14

## 2024-11-12 RX ADMIN — CLOZAPINE 900 MG: 200 TABLET ORAL at 21:14

## 2024-11-12 RX ADMIN — PRAZOSIN HYDROCHLORIDE 5 MG: 5 CAPSULE ORAL at 21:14

## 2024-11-12 RX ADMIN — CARVEDILOL 6.25 MG: 6.25 TABLET, FILM COATED ORAL at 08:22

## 2024-11-12 RX ADMIN — ALBUTEROL SULFATE 2 PUFF: 90 AEROSOL, METERED RESPIRATORY (INHALATION) at 21:22

## 2024-11-12 RX ADMIN — ATORVASTATIN CALCIUM 10 MG: 10 TABLET, FILM COATED ORAL at 08:23

## 2024-11-12 RX ADMIN — CARVEDILOL 6.25 MG: 6.25 TABLET, FILM COATED ORAL at 16:38

## 2024-11-12 RX ADMIN — FAMOTIDINE 20 MG: 20 TABLET ORAL at 17:08

## 2024-11-12 RX ADMIN — LORAZEPAM 0.5 MG: 0.5 TABLET ORAL at 08:23

## 2024-11-12 RX ADMIN — LORAZEPAM 0.5 MG: 0.5 TABLET ORAL at 17:08

## 2024-11-12 RX ADMIN — FLUTICASONE FUROATE 1 PUFF: 100 POWDER RESPIRATORY (INHALATION) at 08:25

## 2024-11-12 RX ADMIN — POLYETHYLENE GLYCOL 3350 17 G: 17 POWDER, FOR SOLUTION ORAL at 08:23

## 2024-11-12 RX ADMIN — CYANOCOBALAMIN TAB 1000 MCG 1000 MCG: 1000 TAB at 08:22

## 2024-11-12 RX ADMIN — FAMOTIDINE 20 MG: 20 TABLET ORAL at 08:23

## 2024-11-12 RX ADMIN — SENNOSIDES AND DOCUSATE SODIUM 2 TABLET: 8.6; 5 TABLET ORAL at 21:14

## 2024-11-12 RX ADMIN — TRAZODONE HYDROCHLORIDE 50 MG: 50 TABLET ORAL at 21:14

## 2024-11-12 RX ADMIN — Medication 15 ML: at 08:23

## 2024-11-12 NOTE — TREATMENT TEAM
11/12/24 1300   Activity/Group Checklist   Group Pet therapy   Attendance Attended   Attendance Duration (min) 0-15   Interactions Disorganized interaction  (Pt. silently in and out of session minimally engageing with the dog.)   Affect/Mood Appropriate;Constricted   Goals Achieved Able to listen to others

## 2024-11-12 NOTE — PLAN OF CARE
Problem: Alteration in Thoughts and Perception  Goal: Treatment Goal: Gain control of psychotic behaviors/thinking, reduce/eliminate presenting symptoms and demonstrate improved reality functioning upon discharge  Outcome: Progressing  Goal: Verbalize thoughts and feelings  Description: Interventions:  - Promote a nonjudgmental and trusting relationship with the patient through active listening and therapeutic communication  - Assess patient's level of functioning, behavior and potential for risk  - Engage patient in 1 on 1 interactions  - Encourage patient to express fears, feelings, frustrations, and discuss symptoms    - Margie patient to reality, help patient recognize reality-based thinking   - Administer medications as ordered and assess for potential side effects  - Provide the patient education related to the signs and symptoms of the illness and desired effects of prescribed medications  Outcome: Progressing  Goal: Refrain from acting on delusional thinking/internal stimuli  Description: Interventions:  - Monitor patient closely, per order   - Utilize least restrictive measures   - Set reasonable limits, give positive feedback for acceptable   - Administer medications as ordered and monitor of potential side effects  Outcome: Progressing  Goal: Agree to be compliant with medication regime, as prescribed and report medication side effects  Description: Interventions:  - Offer appropriate PRN medication and supervise ingestion; conduct AIMS, as needed   Outcome: Progressing  Goal: Recognize dysfunctional thoughts, communicate reality-based thoughts at the time of discharge  Description: Interventions:  - Provide medication and psycho-education to assist patient in compliance and developing insight into his/her illness   Outcome: Progressing  Goal: Complete daily ADLs, including personal hygiene independently, as able  Description: Interventions:  - Observe, teach, and assist patient with ADLS  - Monitor and  promote a balance of rest/activity, with adequate nutrition and elimination   Outcome: Progressing     Problem: Risk for Self Injury/Neglect  Goal: Treatment Goal: Remain safe during length of stay, learn and adopt new coping skills, and be free of self-injurious ideation, impulses and acts at the time of discharge  Outcome: Progressing  Goal: Verbalize thoughts and feelings  Description: Interventions:  - Assess and re-assess patient's lethality and potential for self-injury  - Engage patient in 1:1 interactions, daily, for a minimum of 15 minutes  - Encourage patient to express feelings, fears, frustrations, hopes  - Establish rapport/trust with patient   Outcome: Progressing  Goal: Refrain from harming self  Description: Interventions:  - Monitor patient closely, per order  - Develop a trusting relationship  - Supervise medication ingestion, monitor effects and side effects   Outcome: Progressing  Goal: Recognize maladaptive responses and adopt new coping mechanisms  Outcome: Progressing     Problem: Depression  Goal: Treatment Goal: Demonstrate behavioral control of depressive symptoms, verbalize feelings of improved mood/affect, and adopt new coping skills prior to discharge  Outcome: Progressing  Goal: Verbalize thoughts and feelings  Description: Interventions:  - Assess and re-assess patient's level of risk   - Engage patient in 1:1 interactions, daily, for a minimum of 15 minutes   - Encourage patient to express feelings, fears, frustrations, hopes   Outcome: Progressing  Goal: Refrain from isolation  Description: Interventions:  - Develop a trusting relationship   - Encourage socialization   Outcome: Progressing  Goal: Refrain from self-neglect  Outcome: Progressing     Problem: Anxiety  Goal: Anxiety is at manageable level  Description: Interventions:  - Assess and monitor patient's anxiety level.   - Monitor for signs and symptoms (heart palpitations, chest pain, shortness of breath, headaches, nausea,  feeling jumpy, restlessness, irritable, apprehensive).   - Collaborate with interdisciplinary team and initiate plan and interventions as ordered.  - [place] patient to unit/surroundings  - Explain treatment plan  - Encourage participation in care  - Encourage verbalization of concerns/fears  - Identify coping mechanisms  - Assist in developing anxiety-reducing skills  - Administer/offer alternative therapies  - Limit or eliminate stimulants  Outcome: Progressing

## 2024-11-12 NOTE — ASSESSMENT & PLAN NOTE
Depakote to 1250 mg nightly  Most recent VPA level 54 on 10/12/24  CMP on 10/12/24 reviewed, WNL  Clozaril 900 mg nightly for schizoaffective disorder  Clozaril monitoring:  CRP, CBC/diff, CK QMon for monitoring  ANC 11/11/2024-4.10  VSS  Most recent ECG reviewed 9/9/24 - normal ECG, NSR  Clozaril increased to 900 mg nightly on 10/29/24 - Clozaril level 894 on 11/04/24   Senna to prevent Clozaril induced constipation  Discontinued-Risperdal on 10/28/24  Continue Ativan 0.5 mg twice daily for anxiety   Continue melatonin 3 mg nightly for insomnia  Continue prazosin 5 mg nightly for PTSD associated nightmares; doses to be adjusted as indicated   Continue trazodone 50 mg nightly for insomnia  Discharge disposition: CRR

## 2024-11-12 NOTE — PROGRESS NOTES
Progress Note - Behavioral Health   Name: Meli Nowak 59 y.o. female I MRN: [id number]  Unit/Bed#: [facility] 651-02 I Date of Admission: 7/23/2024   Date of Service: 11/12/2024 I Hospital Day: 112     Assessment & Plan  Schizoaffective disorder, bipolar type (HCC)   Depakote to 1250 mg nightly  Most recent VPA level 54 on 10/12/24  CMP on 10/12/24 reviewed, WNL  Clozaril 900 mg nightly for schizoaffective disorder  Clozaril monitoring:  CRP, CBC/diff, CK QMon for monitoring  ANC 11/11/2024-4.10  VSS  Most recent ECG reviewed 9/9/24 - normal ECG, NSR  Clozaril increased to 900 mg nightly on 10/29/24 - Clozaril level 894 on 11/04/24   Senna to prevent Clozaril induced constipation  Discontinued-Risperdal on 10/28/24  Continue Ativan 0.5 mg twice daily for anxiety   Continue melatonin 3 mg nightly for insomnia  Continue prazosin 5 mg nightly for PTSD associated nightmares; doses to be adjusted as indicated   Continue trazodone 50 mg nightly for insomnia  Discharge disposition: CRR      ASHLIE (obstructive sleep apnea)  - f/u w/ Sleep Medicine in outpatient setting and consider CPAP as indicated      Plan   Progress Toward Goals:   Meli is progressing towards goals of inpatient psychiatric treatment by continued medication compliance and is attending therapeutic modalities on the milieu. However, the patient continues to require inpatient psychiatric hospitalization for continued medication management and titration to optimize symptom reduction, improve sleep hygiene, and demonstrate adequate self-care.    Recommended Treatment: Treatment plan and medication changes discussed and per the attending physician the plan is:    1.Continue with group therapy, milieu therapy and occupational therapy  2.Behavioral Health checks every 7 minutes  3.Continue frequent safety checks and vitals per unit protocol  4.Continue with [facility]IM medical management as indicated  5.Continue with current medication regimen  6.Will review labs in the  a.m.  7.Disposition Planning: Discharge planning and efforts remain ongoing    Behavioral Health Medications: all current active meds have been reviewed and continue current psychiatric medications.  Current Facility-Administered Medications   Medication Dose Route Frequency Provider Last Rate    acetaminophen  650 mg Oral Q4H PRN Marie Ziegler, CRNP      acetaminophen  650 mg Oral Q4H PRN Marie Ziegler, CRNP      acetaminophen  975 mg Oral Q6H PRN Marie Ziegler, CRNP      albuterol  2 puff Inhalation Q4H PRN Kristen Logan, JOSE ELIAS      aluminum-magnesium hydroxide-simethicone  30 mL Oral Q4H PRN Parris Bolanos, CRNP      Artificial Tears  1 drop Both Eyes Q6H PRN Dereje Banuelos MD      atorvastatin  10 mg Oral Daily Marie Ziegler, CRNP      bisacodyl  10 mg Oral Daily PRN Kristen Logan, CRNP      bisacodyl  10 mg Rectal Daily PRN Marie Ziegler, CRNP      carvedilol  6.25 mg Oral BID With Meals Marie Ziegler, CRNP      cloZAPine  900 mg Oral HS Marie Ziegler, CRNP      cyanocobalamin  1,000 mcg Oral Daily Marie Ziegler, CRNP      Diclofenac Sodium  2 g Topical 4x Daily PRN Marie Ziegler, CRNP      famotidine  20 mg Oral BID Shan Fitzgerald, DO      fluticasone  1 puff Inhalation Daily Marie Ziegler, CRNP      hydrOXYzine HCL  25 mg Oral Q6H PRN Max 4/day Marie Ziegler, CRNP      hydrOXYzine HCL  50 mg Oral Q6H PRN Max 4/day Marie Ziegler, CRNP      ibuprofen  600 mg Oral Once Marie Ziegler, CRNP      LORazepam  0.5 mg Oral BID Marie Ziegler, CRNP      Or    LORazepam  0.5 mg Intramuscular BID Marie Ziegler, CRNP      LORazepam  1 mg Intramuscular Q6H PRN Max 3/day Marie Ziegler, CRNP      LORazepam  1 mg Oral Q6H PRN Max 3/day Marie Ziegler, CRNP      melatonin  3 mg Oral HS Marie Ziegler, CRNP      mirtazapine  7.5 mg Oral HS PRN Marie Ziegler, CRNP      Multivitamin  15 mL Oral Daily Marie Ziegler, CRNP      OLANZapine  2.5 mg Oral Q6H PRN Dereje Banuelos,  MD      Or    OLANZapine  2.5 mg Intramuscular Q6H PRN Dereje Banuelos MD      OLANZapine  5 mg Oral Q6H PRN Dereje Banuelos MD      Or    OLANZapine  5 mg Intramuscular Q6H PRN Dereje Banuelos MD      ondansetron  4 mg Oral Q6H PRN Darin Lawton MD      polyethylene glycol  17 g Oral Daily Kristen Padillajosias, JOSE ELIAS      polyethylene glycol  17 g Oral Daily PRN JOSE ELIAS Figueroa      prazosin  5 mg Oral HS JOSE ELIAS Figueroa      senna-docusate sodium  2 tablet Oral HS Rehana Borrego PA-C      traZODone  50 mg Oral HS JOSE ELIAS Figueroa      valproic acid  1,250 mg Oral HS JOSE ELIAS Figueroa         Risks / Benefits of Treatment:  Risks, benefits, and possible side effects of medications explained to patient and patient verbalizes understanding and agreement for treatment.       Subjective    Patient was seen today for continuation of care, records reviewed and patient was discussed with the morning case review team.    Meli was seen today for psychiatric follow-up.  On assessment today, Meli was seen sitting by the window smiling during interview, patient reports that her days are getting better and she looks forward to pet therapy this afternoon.  Auditory hallucinations much improved, patient states that they are not bothersome.  Denying all other psychiatric symptoms.  Patient continues to be treated with Clozaril 900 mg nightly with weekly labs obtained on Monday.  No changes to be made at this time.  Tentative discharge pending CRR placement.    Meli denies acute suicidal/self-harm ideation/intent/plan upon direct inquiry at this time.  Meli remains behaviorally appropriate, no agitation or aggression noted on exam or in report.   Impulse control is intact.  Meli remains adherent to her current psychotropic medication regimen and denies any side effects from medications, as well as none noted on exam.    Psychiatric Review of Systems:  Behavior over the last 24 hours:  unchanged.    Sleep: good  Appetite: good  Medication side effects: none  ROS: no complaints, denies shortness of breath or chest pain and all other systems are negative for acute changes        Objective    Vitals:  Vitals:    11/12/24 0742   BP: 135/61   Pulse: 82   Resp: 18   Temp: 98 °F (36.7 °C)   SpO2: 97%       Laboratory Results:  I have personally reviewed all pertinent laboratory/tests results.  Most Recent Labs:   Lab Results   Component Value Date    WBC 9.25 11/11/2024    RBC 4.44 11/11/2024    HGB 14.0 11/11/2024    HCT 44.1 11/11/2024     11/11/2024    RDW 13.6 11/11/2024    NEUTROABS 4.10 11/11/2024    SODIUM 138 10/12/2024    K 4.1 10/12/2024     10/12/2024    CO2 29 10/12/2024    BUN 18 10/12/2024    CREATININE 0.97 10/12/2024    GLUC 146 (H) 10/12/2024    GLUF 98 08/10/2024    CALCIUM 9.2 10/12/2024    AST 12 (L) 10/12/2024    ALT 12 10/12/2024    ALKPHOS 84 10/12/2024    TP 6.7 10/12/2024    ALB 3.9 10/12/2024    TBILI 0.26 10/12/2024    VALPROICTOT 54 10/12/2024    AMMONIA 32 07/03/2024    [id number] 2.000 07/24/2024    HGBA1C 6.4 (H) 05/03/2024     05/03/2024       Mental Status Evaluation:  Appearance:  dressed appropriately, improved grooming   Behavior:  cooperative, calm   Speech:  normal rate and volume   Mood:  less anxious, less depressed   Affect:  constricted   Thought Process:  perseverative, concrete   Thought Content:  Anabaptism preoccupation, paranoid ideation   Perceptual Disturbances: auditory hallucinations still present, but less frequent   Risk Potential: Suicidal ideation - None  Homicidal ideation - None  Potential for aggression - No   Memory:  recent and remote memory grossly intact   Sensorium  person, place, and time/date      Consciousness:  alert and awake   Attention: attention span and concentration are age appropriate   Insight:  limited   Judgment: limited   Gait/Station: normal gait/station   Motor Activity: no abnormal movements       Counseling /  Coordination of Care:  Patient's progress reviewed with nursing staff.  Medications, treatment progress and treatment plan reviewed with patient.  Supportive counseling provided to the patient.    This note was completed in part utilizing Dragon dictation Software. Grammatical, translation, syntax errors, random word insertions, spelling mistakes, and incomplete sentences may be an occasional consequence of this system secondary to software limitations with voice recognition, ambient noise, and hardware issues. If you have any questions or concerns about the content, text, or information contained within the body of this dictation, please contact the provider for clarification

## 2024-11-12 NOTE — NURSING NOTE
Pt is calm and cooperative with care. Pt is visible for meals otherwise isolative to room. Pt takes all scheduled medications and has good intake at meals time. Pt denies anxiety ,depressions and all psych s/s . Will maintain q 15 mins checks.

## 2024-11-13 PROCEDURE — 99232 SBSQ HOSP IP/OBS MODERATE 35: CPT | Performed by: STUDENT IN AN ORGANIZED HEALTH CARE EDUCATION/TRAINING PROGRAM

## 2024-11-13 RX ADMIN — CYANOCOBALAMIN TAB 1000 MCG 1000 MCG: 1000 TAB at 08:20

## 2024-11-13 RX ADMIN — Medication 15 ML: at 08:19

## 2024-11-13 RX ADMIN — CARVEDILOL 6.25 MG: 6.25 TABLET, FILM COATED ORAL at 08:20

## 2024-11-13 RX ADMIN — FAMOTIDINE 20 MG: 20 TABLET ORAL at 08:20

## 2024-11-13 RX ADMIN — PRAZOSIN HYDROCHLORIDE 5 MG: 5 CAPSULE ORAL at 21:23

## 2024-11-13 RX ADMIN — CLOZAPINE 900 MG: 200 TABLET ORAL at 21:23

## 2024-11-13 RX ADMIN — FAMOTIDINE 20 MG: 20 TABLET ORAL at 17:11

## 2024-11-13 RX ADMIN — SENNOSIDES AND DOCUSATE SODIUM 2 TABLET: 8.6; 5 TABLET ORAL at 21:23

## 2024-11-13 RX ADMIN — VALPROIC ACID 1250 MG: 500 SOLUTION ORAL at 21:23

## 2024-11-13 RX ADMIN — HYDROXYZINE HYDROCHLORIDE 50 MG: 50 TABLET, FILM COATED ORAL at 14:10

## 2024-11-13 RX ADMIN — TRAZODONE HYDROCHLORIDE 50 MG: 50 TABLET ORAL at 21:23

## 2024-11-13 RX ADMIN — ALBUTEROL SULFATE 2 PUFF: 90 AEROSOL, METERED RESPIRATORY (INHALATION) at 21:31

## 2024-11-13 RX ADMIN — POLYETHYLENE GLYCOL 3350 17 G: 17 POWDER, FOR SOLUTION ORAL at 08:19

## 2024-11-13 RX ADMIN — ATORVASTATIN CALCIUM 10 MG: 10 TABLET, FILM COATED ORAL at 08:20

## 2024-11-13 RX ADMIN — FLUTICASONE FUROATE 1 PUFF: 100 POWDER RESPIRATORY (INHALATION) at 08:22

## 2024-11-13 RX ADMIN — LORAZEPAM 0.5 MG: 0.5 TABLET ORAL at 17:11

## 2024-11-13 RX ADMIN — LORAZEPAM 0.5 MG: 0.5 TABLET ORAL at 08:20

## 2024-11-13 RX ADMIN — MELATONIN TAB 3 MG 3 MG: 3 TAB at 21:23

## 2024-11-13 NOTE — NURSING NOTE
Pt is calm and cooperative with care. Pt is out for meals otherwise isolative to room and self . Pt complaint with all scheduled  medication and has good intake at meals time. Pt denies all psych s/s at this time . Will maintain q 15 mins checks.

## 2024-11-13 NOTE — TREATMENT TEAM
11/13/24 0827   Team Meeting   Meeting Type Daily Rounds   Team Members Present   Team Members Present Physician;Nurse;;;Occupational Therapist   Physician Team Member Dr. Banuelos / Dr. Hurst / Dr. Bland / JAIR Ziegler   Nursing Team Member David   Care Management Team Member Manju   Social Work Team Member Anastacio   OT Team Member Eduardo   Patient/Family Present   Patient Present No   Patient's Family Present No       Treatment Team Rounds Completed  Medical and Psychiatric Review Completed  D/C: in group shortly, denying all, d/c: LTSR

## 2024-11-13 NOTE — TREATMENT TEAM
11/12/24 0825   Team Meeting   Meeting Type Daily Rounds   Team Members Present   Team Members Present Physician;Nurse;;;Occupational Therapist   Physician Team Member Dr. Banuelos / Dr. Hurst / Dr. Bland / JAIR Ziegler   Nursing Team Member Keith   Care Management Team Member Manju   Social Work Team Member Anastacio   OT Team Member Eduardo   Patient/Family Present   Patient Present No   Patient's Family Present No

## 2024-11-13 NOTE — PLAN OF CARE
Post Acute Skilled Services Subsequent Visit Note     Date of Service: 5/10/2023  Location seen at:  [facility]  Subacute / Skilled Need: Rehabilitation    PCP: Sergio Quach DO   Patient Care Team:  Sergio Quach DO as PCP - General (Internal Medicine)  Alex Hull OD as Ophthalmology (Optometrist - Corneal and Contact Management)  [facility] as Dentist- General Practice (Dentistry)  Jamel Mott MD (Nephrology)  Nikhil Treadwell MD (Internal Medicine - Pulmonary Disease)  Patel Lopez DO (Cardiovascular Disease)  Herve Wong MD (Cardiovascular Disease)  Dilip Sosa APNP as Palliative Care (Nurse Practitioner)  Coco Rodriguez NP as Post Acute Facility Provider: APC (Nurse Practitioner)  Kati Rosenbaum MD as Post Acute Facility Provider: Physician (Family Practice)  Attending SNF MD: Dr. Kati Rosenbaum    Seen by Coco Rodriguez NP today    Gerald Britt is a 89 year old male presenting to Post Acute Skilled Nursing for:  Acute on chronic systolic heart failure (CMD)  (primary encounter diagnosis)  Hypertensive heart disease with heart failure (CMS/HCC)  Paroxysmal atrial fibrillation (CMD)  OMER  Goals of care, counseling/discussion     History of Present Illness: Patient has history of hospitalization April 4-19, 2023 at [facility] for acute hypoxic respiratory failure thought to be likely multifactorial.  Patient presented ER with increasing shortness of breath and increased lower extremity edema status post recent hospitalization for left foot osteomyelitis.  Chest x-ray showed infiltrate worsening in her upper right lung with possible superimposed pneumonia with rest, with respiratory failure due to decompensation of congestive heart failure.  Required high-flow oxygen, transferred to ICU for BiPAP.  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  Continued on ceftriaxone and  Pt did not attend any groups when prompted or offered.   Problem: Alteration in Thoughts and Perception  Goal: Attend and participate in unit activities, including therapeutic, recreational, and educational groups  Description: Interventions:  -Encourage Visitation and family involvement in care  Outcome: Not Progressing      azithromycin for osteomyelitis and pneumonia, also treated with fluconazole for candidal urinary colonization.  History of pressure ulcers on admission over bilateral feet, shins, and coccyx/sacrum, followed by Wound care.  Infectious Disease consulted in regards to osteomyelitis treatment, with ceftriaxone and metronidazole to continue through 4/22.  Nephrology followed the patient closely, with multiple outpatient labs ordered on discharge. Kept on lasix increased from 40mg to 60mg daily with improvement in diuresis. Has history of diabetes mellitus type 2 with insulin adjustments required due to steroid use.  Blood pressure remained stable.  Has history of paroxysmal l atrial fibrillation and was continued on apixaban and beta-blocker.  Developed hyperkalemia with discontinuation of potassium supplement and spironolactone. Continued to require oxygen at 3l/nc on discharge.      Patient also has history of hospitalization March 8-20, 2023 for osteomyelitis of the left foot status post I&D with debridement by Dr. Reid 3/9, angiogram with intervention to the left lower extremity 3/14 by Dr. Marcus  (left femoral artery/above-the-knee popliteal artery balloon angioplasty, atherectomy with Hawk 1 M device with 7 mm embolic protection filter device, intra vascular lithotripsy 6.5 x 60), with great toe amputation and wound closure per Dr. Reid 3/16.  He was treated ceftriaxone and metronidazole, with Eliquis and Plavix; aspirin discontinued.  He was followed by Vascular surgery and Infectious Disease.      Acute on chronic systolic heart failure, ischemic cardiomyopathy--patient seen 5/8  with weight gain noted of 7lb over past week, 12lbs past 2 weeks, increased edema both upper and lower extremities and patient complaining of increased shortness of breath at rest and with exertion, positive orthopnea, increased fatigue, increased cough without sputum production. Lasix increased to 80mg am/40mg pm with  improvement in symptoms by 5/9 and 2lb wt loss. Early this a.m. patient began demonstrating decrease in oxygen saturations ranging 78-85% on 4l/nc. Denies current or overnight episode chest pain/pressure, palpitations, diaphoresis, radiating pain to arm/jaw/abdomen though feeling mildly nauseous. Refused further hospitalization with family at bedside. Treatment options and care goals discussed at length (see below).  History includes left chest cardiac pacemaker/defibrillator in place.  Prior history includes CABG 1994 x 4 vessels, [employer] BiV ICD implanted 05/19/2022, TAVR 2021, PCI with drug-eluting stent 2015. Follows with Cardiology, seen 5/3, and with Nephrology monitoring weekly renal panels. Continues on metoprolol and daily lasix.     Obstructive sleep apnea--staff again note patient is not using BiPAP. Patient again states it starts to bother him after a few hours and he cant sleep with it on, denies feeling short of breath with it in place. Continues on oxygen at 3l/nc if not in place. Denies excessive daytime somnolence, insomnia, headache, depression, or increasing irritability. Will follow with Dr. Treadwell 5/11 if well enough.      Hypertensive heart disease with heart failure--denies headache, orthostatic dizziness, vision changes, new one-sided weakness or other neurological changes, or chest pain/pressure. Blood pressures controlled with metoprolol and lasix.     Paroxysmal atrial fibrillation--denies palpitations, calf pain, new shortness of breath, chest pain/pressure.  Continues on metoprolol and Eliquis with no symptoms of bruising/bleeding on Eliquis.    Care goals--reviewed treatment options with patient and family, including hospitalization with possibility of ICU admission, care in place/palliative care, as well as option for comfort care. Patient voiced good understanding of options and wished to be treated with palliative care, with no further hospitalization, and goal to return home  under Palliative services. Voiced good understanding that prognosis could be poor treating heart failure in the facility, with patient/family voicing good understanding. Continues to wish DNR status.     HISTORY  Past Medical History:   Diagnosis Date   • Actinic keratosis    • Arthritis    • BPH (benign prostatic hyperplasia)    • CAD (coronary artery disease)    • Chronic kidney disease    • Diabetic polyneuropathy associated with type 2 diabetes mellitus (CMD) 2019   • KLINE (dyspnea on exertion) 2014    PFT 14: There is a slight restriction in lung mechanics and lung volumes.Diffusion capacity was normal. The patient's lung function is approximately 75-80% of predicted.  Echo 09: Normal left ventricular systolic function, ejection fraction kept above 60%.   Diastolic dysfunction of the left ventricle.   Mild mitral and aortic insufficiency and trace tricuspide insufficiency    • High cholesterol    • HTN (hypertension)    • Pressure injury of foot, stage 2 (CMD) 5/3/2023   • PVD (peripheral vascular disease) (CMD)    • Thoracic disc herniation 11/10/2016   • Tobacco chew use 2016   • Tobacco use disorder 2019   • Type II diabetes mellitus (CMD)      Social and family history reviewed and updated as needed.    ADVANCE DIRECTIVES:  Power of  Status:  Not Activated  Code Status:  DNR  Goals of Care: rehabilitate and prolong survival, but only if quality of life can be maintained; no further hospitalization  Advanced Directive Forms: on file     DEPRESSION SCREENING:  PHQ 2/9 Test Results: 0  0: Not at all  1: Several days  2: More than half the days  3: Nearly every day     DEPRESSION ASSESSMENT/PLAN:  Depression screening is negative no further plan needed.     FRAILTY SCORE: 4  ADVANCED ILLNESS SCREENIN    ALLERGIES:  Allergies as of 05/10/2023 - Reviewed 2023   Allergen Reaction Noted   • Shellfish-derived products   (food or med) Other (See Comments) 2021        CURRENT MEDICATIONS:   Current Outpatient Medications   Medication Sig Dispense Refill   • furosemide (LASIX) 40 MG tablet 80mg a.m, 40mg pm     • gabapentin (NEURONTIN) 100 MG capsule Take 1 capsule by mouth nightly. 30 capsule 3   • Lactobacillus (Probiotic Acidophilus) Cap Take 1 capsule by mouth daily.     • Admelog 100 UNIT/ML injectable solution Inject 12 Units into the skin in the morning and 12 Units at noon and 12 Units in the evening. Inject before meals.     • polyethylene glycol (MIRALAX) 17 g packet Take 17 g by mouth as needed. Stir and dissolve powder in any 4 to 8 ounces of beverage, then drink.     • Nutritional Supplements (Glucerna) Liquid Take 8 fluid ounces by mouth in the morning and 8 fluid ounces at noon and 8 fluid ounces in the evening.     • acetaminophen (TYLENOL) 325 MG tablet 650mg TID and every 4 hr prn pain     • pantoprazole (PROTONIX) 40 MG tablet TAKE 1 TABLET BY MOUTH  DAILY 90 tablet 3   • insulin glargine (Lantus SoloStar) 100 UNIT/ML pen-injector Inject 20 Units into the skin nightly. Prime 2 units before each use. 15 mL 3   • finasteride (PROSCAR) 5 MG tablet Take 5 mg by mouth daily.     • Nutritional Supplements (Arginaid) Pack Take 1 packet by mouth 2 (two) times a day.     • Multiple Vitamin (MULTI VITAMIN DAILY PO) Take 1 tablet by mouth daily.     • tamsulosin (FLOMAX) 0.4 MG Cap Take 0.4 mg by mouth daily.     • melatonin 3 MG Take 3 mg by mouth daily.     • acetaminophen (TYLENOL) 650 MG suppository Place 650 mg rectally every 4 hours as needed for Fever.     • magnesium hydroxide (MILK OF MAGNESIA) 400 MG/5ML suspension Take 30 mLs by mouth daily as needed for Constipation.     • Glucagon, rDNA, (Glucagon Emergency) 1 MG Kit Inject 1 mg as directed as needed (Severe hypoglycemia). Repeat Blood Glucose in 10 minutes if <70 and unresponsive repeat glucagon.After 2nd dose if remains unresponsive call 911     • dextrose (GLUTOSE) 40 % Gel Take 1 Tube by mouth 2  times daily as needed for Hypoglycemia.     • glucose 4 g chewable tablet Chew 4 g by mouth 2 times daily as needed for Low blood sugar.     • Insulin Lispro, 1 Unit Dial, (HumaLOG KwikPen) 100 UNIT/ML pen-injector Inject 12 Units into the skin in the morning and 12 Units at noon and 12 Units in the evening. Inject before meals. Plus sliding scale as follows:   140-180 = 1 unit  181-221 = 2 units  222-262 = 3 units  263-303 = 4 units  304-344 = 5 units  345-385 = 6 units  Over 385 = call MD  Prime 2 units before each dose.     • metoPROLOL succinate (TOPROL-XL) 50 MG 24 hr tablet Take 50 mg by mouth in the morning and 50 mg in the evening.     • diclofenac (VOLTAREN) 1 % gel Apply 2 g topically 4 times daily as needed (left elbow pain). 50 g 0   • Probiotic Product (PROBIOTIC DAILY PO) Take 1 capsule by mouth daily.     • apixaBAN (Eliquis) 2.5 MG Tab Take 1 tablet by mouth in the morning and 1 tablet in the evening. 180 tablet 1   • atorvastatin (LIPITOR) 20 MG tablet Take 1 tablet by mouth nightly. 90 tablet 2   • nitroGLYcerin (NITROSTAT) 0.4 MG sublingual tablet Place 1 tablet under the tongue every 5 minutes as needed for Chest pain. If no relief after 3rd tab call 911. 25 tablet 1   • clopidogrel (PLAVIX) 75 MG tablet TAKE 1 TABLET BY MOUTH  DAILY 90 tablet 3   • isosorbide mononitrate (IMDUR) 60 MG 24 hr tablet Take 1 tablet by mouth daily. 90 tablet 3   • Cholecalciferol 2000 UNITS CAPS Take 1 capsule by mouth daily.      • Ferrous Sulfate (IRON) 325 (65 FE) MG TABS Take 1 tablet by mouth daily.        No current facility-administered medications for this visit.     BASELINE FUNCTIONAL STATUS:  Independent ambulation/transfers     CURRENT FUNCTIONAL STATUS:  Transfer assist of 1, Assist of 1 ADLs, Feeds self with assistance/meal prep/cues and Wheelchair long distances    REVIEW OF SYSTEMS:  See HPI, no other changes    PHYSICAL ASSESSMENT:  GENERAL: Alert, appears in no acute distress.  VITAL SIGNS:   Visit  Vitals  BP (!) 141/69   Pulse 62   Temp 98.9 °F (37.2 °C)   Resp (!) 24   Ht 5' 10\" (1.778 m)   Wt 83 kg (183 lb)   SpO2 91% Comment: 5l/nc range 75-89 on 4l/nc   BMI 26.26 kg/m²   blood sugars range 109-212, majority less than 200  Pain Level 0 /10  INTEGUMENT: No new rashes or lesions, petechia, or purpura. Pink, warm and dry. Edema: 1+ pitting right hand to forearm, trace left hand to forearm; 2+ pitting mid shin to proximal thigh bilaterally.   HEENT: Pupils equal, round and reactive to light. Conjunctivae pink, sclerae white. No lacrimal swelling or discharge. Nasal mucosa pink, moist without excess discharge. Oropharynx pink and moist without lesion or exudate.   CARDIOVASCULAR: S1, S2 with S1 murmur grade 3/6.. Regular rate and rhythm. Capillary refill in fingers less than 3 seconds. No cyanosis.   RESPIRATORY: Rales with expiratory wheezes left fields, rales right base, diminished bases bilaterally. Normal excursion at rest, tachypnea with large motor movement, no tachypnea with conversation, no use of accessory muscles.  No cough.  GASTROINTESTINAL: Abdomen soft, nondistended. Bowel sounds normoactive x4 quadrants. No pain to palpation to 4 quadrants.  : No suprapubic tenderness.  Riddle catheter draining clear yellow urine.  NEUROLOGIC: Cranial nerves II-XII grossly intact. No new focal neurological deficits. Moves all extremities spontaneously and easily. Oriented to person, place and time. Judgment and response appropriate for age. Affect appropriate with easy smile. Speech slurred/mumbled per norm; aphasia present. No one-sided weakness or facial droop. No new twitching or tremors.  MUSCULOSKELETAL: Range of motion demonstrates no pain or crepitus in upper or lower extremities.  No bony misalignment, redness, swelling, tenderness, or effusions noted. Proximal and distal strength 4/5 in bilateral upper and lower extremities with normal tone.    Labs: The Laboratory values listed below have been reviewed  and pertinent findings discussed in the Assessment and Plan.  Lab Results   Component Value Date    WBC 6.5 05/08/2023    WBC 12.2 (H) 04/22/2023    HCT 33.9 (L) 05/08/2023    HCT 32.1 (L) 04/22/2023    HGB 10.3 (L) 05/08/2023    HGB 9.8 (L) 04/22/2023     05/08/2023     04/22/2023     04/19/2023     04/18/2023   ,   Lab Results   Component Value Date    SODIUM 145 05/08/2023    SODIUM 145 05/08/2023    POTASSIUM 4.1 05/08/2023    POTASSIUM 4.1 05/08/2023    CHLORIDE 105 05/08/2023    CHLORIDE 105 05/08/2023    CO2 34 (H) 05/08/2023    CO2 34 (H) 05/08/2023    BUN 43 (H) 05/08/2023    BUN 43 (H) 05/08/2023    BUN 60 (H) 05/01/2023    BUN 67 (H) 04/27/2023    CREATININE 1.35 (H) 05/08/2023    CREATININE 1.35 (H) 05/08/2023    CREATININE 1.41 (H) 05/01/2023    CREATININE 1.31 (H) 04/27/2023    GLUCOSE 75 05/08/2023    GLUCOSE 75 05/08/2023    GLUCOSE 100 (H) 05/01/2023    GLUCOSE 187 (H) 04/27/2023    and   NT-proBNP (pg/mL)   Date Value   05/08/2023 7,823 (H)     Radiology: Imaging studies have been reviewed and pertinent findings discussed in the Assessment and Plan.  May 10, 2023 chest x-ray PA/lateral.  Findings:  Lungs are adequately expanded.  There is a prominent CHF pattern of interstitial and alveolar edema.  Again present as cardiomegaly with evidence of CABG and permanent sequential pacer in place.    Impression:  Cardiomegaly with evidence of CABG and permanent sequential pacer in place with prominent CHF pattern    ASSESSMENT AND PLAN  Acute on chronic systolic heart failure (CMD)  (primary encounter diagnosis)  Plan: repeat chest xray today. Metolazone 5 mg with Lasix 80 mg now. Lasix 80 mg twice daily with metolazone 5 mg daily x2 more days. Potassium 40 mEq daily.  Continue metoprolol at current dose. Will plan addition possible addition of spironolactone. Follows with Dr. Wong 5/15. Continue daily weights. Renal panel 5/12     OMER on BiPap  Plan: Unable to tolerate,  refuses BiPAP nightly. Will follow with Pulmonology this week. Continue with oxygen per nasal cannula 3l/nc overnight with staff to monitor oxygen saturations.     Paroxysmal atrial fibrillation (CMD)  Plan: Well controlled on metoprolol and apixaban.  Amiodarone discontinued due to possible lung injury.     Hypertensive heart disease with heart failure (CMS/HCC)  Plan: Well controlled at this time on metoprolol and Lasix.  Will monitor blood pressures at least twice daily.    Advance Care Planning    Location: [facility]   Advance care planning documents on file - yes   Advance care planning discussion offered to participants: Coco Rodriguez NP, Patient and son and daughter consented to discussion. Time spent on discussion was 18 minutes.       GOALS OF CARE NOTE    A Goals of Care discussion was held for the following reasons: advanced disease process: advanced heart failure and age greater than 80. Those present have a good understanding of the patient's situation.   Patient-Centered Goals: Prolong survival, only if quality of life is meaningful.  Code Status: Do Not Resuscitate     DISCHARGE PLANNING: wishes to return home  Prognosis: fair  Discussed with: RN / Nursing, Family, Patient and SW/  Barriers to discharge: not medically ready for discharge and therapy needs  Anticipated disposition: Home with Home Health Services    FOLLOW UP APPOINTMENTS:  Future Appointments   Date Time Provider Department Center   5/11/2023 10:40 AM Nikhil Treadwell MD [facility]   5/15/2023 10:20 AM Herve Wong MD [facility]P [facility]   5/18/2023  9:00 AM Tara Mcknight APNP [facility]   5/31/2023  8:10 AM [facility] REMOTE DEVICE CHECK [id number] [facility]   6/23/2023 10:00 AM OS[facility] ACL LAB ACLOSW [facility]   6/30/2023  9:00 AM Jamel Mott MD [facility]P [facility]   7/24/2023  8:00 AM OS[facility] ACL LAB ACLOSW [facility]   7/31/2023  1:00 PM Sergio Quach, DO [email or web address] [facility]   11/3/2023 10:00 AM Patel Lopez DO [facility]ARD [facility]     Total  time spent is more than 45 minutes, with more than 50% of the time spent in coordination of care, counseling, review of records and discussion of plan of care with the patient /staff /family.

## 2024-11-13 NOTE — PLAN OF CARE
Problem: Prexisting or High Potential for Compromised Skin Integrity  Goal: Skin integrity is maintained or improved  Description: INTERVENTIONS:  - Identify patients at risk for skin breakdown  - Assess and monitor skin integrity  - Assess and monitor nutrition and hydration status  - Monitor labs   - Assess for incontinence   - Turn and reposition patient  - Assist with mobility/ambulation  - Relieve pressure over bony prominences  - Avoid friction and shearing  - Provide appropriate hygiene as needed including keeping skin clean and dry  - Evaluate need for skin moisturizer/barrier cream  - Collaborate with interdisciplinary team   - Patient/family teaching  - Consider wound care consult   Outcome: Progressing     Problem: Alteration in Thoughts and Perception  Goal: Refrain from acting on delusional thinking/internal stimuli  Description: Interventions:  - Monitor patient closely, per order   - Utilize least restrictive measures   - Set reasonable limits, give positive feedback for acceptable   - Administer medications as ordered and monitor of potential side effects  Outcome: Progressing

## 2024-11-13 NOTE — ASSESSMENT & PLAN NOTE
Depakote to 1250 mg nightly  Most recent VPA level 54 on 10/12/24  CMP on 10/12/24 reviewed, WNL  Clozaril 900 mg nightly for schizoaffective disorder  Clozaril monitoring:  CRP, CBC/diff, CK QMon for monitoring  ANC 11/11/2024-4.10  VSS  Most recent ECG reviewed 9/9/24 - normal ECG, NSR  Clozaril increased to 900 mg nightly on 10/29/24 - Clozaril level 894 on 11/04/24   Risperdal discontinued l on 10/28/24 for moderate affectivity  Continue Ativan 0.5 mg twice daily for anxiety   Continue melatonin 3 mg nightly for insomnia  Continue prazosin 5 mg nightly for PTSD associated nightmares; doses to be adjusted as indicated   Continue trazodone 50 mg nightly for insomnia  Discharge disposition: [facility]

## 2024-11-13 NOTE — TREATMENT TEAM
11/13/24 1405   Powell Anxiety Scale   Anxious Mood 4   Tension 4   Fears 4   Insomnia 2   Intellectual 0   Depressed Mood 0   Somatic Complaints: Muscular 0   Somatic Complaints: Sensory 0   Cardiovascular Symptoms 0   Respiratory Symptoms 0   Gastrointestinal Symptoms 0   Genitourinary Symptoms 0   Autonomic Symptoms 3   Behavior at Interview 2   Powell Anxiety Score 19     Pt given Prn Atarax 50 mg for anxiety. Will follow up for effectiveness.

## 2024-11-13 NOTE — PROGRESS NOTES
Progress Note - Behavioral Health   Name: Meli Nowak 59 y.o. female I MRN: [id number]  Unit/Bed#: [facility] 651-02 I Date of Admission: 7/23/2024   Date of Service: 11/13/2024 I Hospital Day: 113     Assessment & Plan  Schizoaffective disorder, bipolar type (HCC)   Depakote to 1250 mg nightly  Most recent VPA level 54 on 10/12/24  CMP on 10/12/24 reviewed, WNL  Clozaril 900 mg nightly for schizoaffective disorder  Clozaril monitoring:  CRP, CBC/diff, CK QMon for monitoring  ANC 11/11/2024-4.10  VSS  Most recent ECG reviewed 9/9/24 - normal ECG, NSR  Clozaril increased to 900 mg nightly on 10/29/24 - Clozaril level 894 on 11/04/24   Risperdal discontinued l on 10/28/24 for moderate affectivity  Continue Ativan 0.5 mg twice daily for anxiety   Continue melatonin 3 mg nightly for insomnia  Continue prazosin 5 mg nightly for PTSD associated nightmares; doses to be adjusted as indicated   Continue trazodone 50 mg nightly for insomnia  Discharge disposition: CRR      ASHLIE (obstructive sleep apnea)  - f/u w/ Sleep Medicine in outpatient setting and consider CPAP as indicated      Plan   Progress Toward Goals:   Meli is progressing towards goals of inpatient psychiatric treatment by continued medication compliance and is attending therapeutic modalities on the milieu. However, the patient continues to require inpatient psychiatric hospitalization for continued medication management and titration to optimize symptom reduction, improve sleep hygiene, and demonstrate adequate self-care.    Recommended Treatment: Treatment plan and medication changes discussed and per the attending physician the plan is:    1.Continue with group therapy, milieu therapy and occupational therapy  2.Behavioral Health checks every 7 minutes  3.Continue frequent safety checks and vitals per unit protocol  4.Continue with SLIM medical management as indicated  5.Continue with current medication regimen  6.Will review labs in the a.m.  7.Disposition  Planning: Discharge planning and efforts remain ongoing    Behavioral Health Medications: all current active meds have been reviewed and continue current psychiatric medications.  Current Facility-Administered Medications   Medication Dose Route Frequency Provider Last Rate    acetaminophen  650 mg Oral Q4H PRN Marie Ziegler, CRNP      acetaminophen  650 mg Oral Q4H PRN Marie Ziegler, CRNP      acetaminophen  975 mg Oral Q6H PRN Marie Ziegler, CRNP      albuterol  2 puff Inhalation Q4H PRN Kristen Logan, CRNP      aluminum-magnesium hydroxide-simethicone  30 mL Oral Q4H PRN Parris Bolanos, CRNP      Artificial Tears  1 drop Both Eyes Q6H PRN Dereje Banuelos MD      atorvastatin  10 mg Oral Daily Marie Ziegler, CRNP      bisacodyl  10 mg Oral Daily PRN Kristen Logan, CRNP      bisacodyl  10 mg Rectal Daily PRN Marie Ziegler, CRNP      carvedilol  6.25 mg Oral BID With Meals Marie Ziegler, CRNP      cloZAPine  900 mg Oral HS Marie Ziegler, CRNP      cyanocobalamin  1,000 mcg Oral Daily Marie Ziegler, CRNP      Diclofenac Sodium  2 g Topical 4x Daily PRN Marie Zieglre, CRNP      famotidine  20 mg Oral BID Shan Fitzgerald, DO      fluticasone  1 puff Inhalation Daily Marie Ziegler, CRNP      hydrOXYzine HCL  25 mg Oral Q6H PRN Max 4/day Marie Ziegler, CRNP      hydrOXYzine HCL  50 mg Oral Q6H PRN Max 4/day Marie Ziegler, CRNP      ibuprofen  600 mg Oral Once Marie Ziegler, CRNP      LORazepam  0.5 mg Oral BID Marie Ziegler, CRNP      Or    LORazepam  0.5 mg Intramuscular BID Marie Ziegler, CRNP      LORazepam  1 mg Intramuscular Q6H PRN Max 3/day Marie Ziegler, CRNP      LORazepam  1 mg Oral Q6H PRN Max 3/day Marie Ziegler, CRNP      melatonin  3 mg Oral HS Marie Ziegler, CRNP      mirtazapine  7.5 mg Oral HS PRN Marie Ziegler, CRNP      Multivitamin  15 mL Oral Daily Marie Ziegler, CRNP      OLANZapine  2.5 mg Oral Q6H PRN Dereje Banuelos MD      Or     "OLANZapine  2.5 mg Intramuscular Q6H PRN Dereje Banuelos MD      OLANZapine  5 mg Oral Q6H PRN Dereje Banuelos MD      Or    OLANZapine  5 mg Intramuscular Q6H PRN Dereje Banuelos MD      ondansetron  4 mg Oral Q6H PRN Darin Lawton MD      polyethylene glycol  17 g Oral Daily Kristen Padillajosias, JOSE ELIAS      polyethylene glycol  17 g Oral Daily PRN JOSE ELIAS Figueroa      prazosin  5 mg Oral HS JOSE ELIAS Figueroa      senna-docusate sodium  2 tablet Oral HS Rehana Borrego PA-C      traZODone  50 mg Oral HS JOSE ELIAS Figueroa      valproic acid  1,250 mg Oral HS JOSE ELIAS Figueroa         Risks / Benefits of Treatment:  Risks, benefits, and possible side effects of medications explained to patient and patient verbalizes understanding and agreement for treatment.       Subjective    Patient was seen today for continuation of care, records reviewed and patient was discussed with the morning case review team.    Meli was seen today for psychiatric follow-up.  On assessment today, Meli was scant and guarded.  Stating \"I'm just so tired\", she reports that sleep and appetite changed.  Also her hallucinations significantly decreased, patient reports that medication is working well.  No agitation or aggression overnight, no as needed medications given.  Tentative discharge pending placement.    Meli denies acute suicidal/self-harm ideation/intent/plan upon direct inquiry at this time. Impulse control is intact.  Meli remains adherent to her current psychotropic medication regimen and denies any side effects from medications, as well as none noted on exam.    Psychiatric Review of Systems:  Behavior over the last 24 hours:  unchanged.   Sleep:   Appetite:   Medication side effects:   ROS: no complaints, denies shortness of breath or chest pain and all other systems are negative for acute changes        Objective    Vitals:  Vitals:    11/13/24 0745   BP: 116/57   Pulse: 75   Resp: 17   Temp: (!) 97.2 °F " (36.2 °C)   SpO2: 90%       Laboratory Results:  I have personally reviewed all pertinent laboratory/tests results.  Most Recent Labs:   Lab Results   Component Value Date    WBC 9.25 11/11/2024    RBC 4.44 11/11/2024    HGB 14.0 11/11/2024    HCT 44.1 11/11/2024     11/11/2024    RDW 13.6 11/11/2024    NEUTROABS 4.10 11/11/2024    SODIUM 138 10/12/2024    K 4.1 10/12/2024     10/12/2024    CO2 29 10/12/2024    BUN 18 10/12/2024    CREATININE 0.97 10/12/2024    GLUC 146 (H) 10/12/2024    GLUF 98 08/10/2024    CALCIUM 9.2 10/12/2024    AST 12 (L) 10/12/2024    ALT 12 10/12/2024    ALKPHOS 84 10/12/2024    TP 6.7 10/12/2024    ALB 3.9 10/12/2024    TBILI 0.26 10/12/2024    VALPROICTOT 54 10/12/2024    AMMONIA 32 07/03/2024    [id number] 2.000 07/24/2024    HGBA1C 6.4 (H) 05/03/2024     05/03/2024       Mental Status Evaluation:  Appearance:  disheveled, marginal hygiene   Behavior:  cooperative, calm, guarded   Speech:  normal rate and volume, scant   Mood:  less anxious, less depressed   Affect:  constricted   Thought Process:  illogical, perseverative, concrete   Thought Content:  somatic preoccupation, paranoid ideation   Perceptual Disturbances: auditory hallucinations still present, but less frequent   Risk Potential: Suicidal ideation - None  Homicidal ideation - None  Potential for aggression - No   Memory:  recent and remote memory grossly intact   Sensorium  person, place, and time/date      Consciousness:  alert and awake   Attention: attention span and concentration are age appropriate   Insight:  limited   Judgment: limited   Gait/Station: normal gait/station   Motor Activity: no abnormal movements       Counseling / Coordination of Care:  Patient's progress reviewed with nursing staff.  Medications, treatment progress and treatment plan reviewed with patient.  Supportive counseling provided to the patient.    This note was completed in part utilizing Dragon dictation Software.  Grammatical, translation, syntax errors, random word insertions, spelling mistakes, and incomplete sentences may be an occasional consequence of this system secondary to software limitations with voice recognition, ambient noise, and hardware issues. If you have any questions or concerns about the content, text, or information contained within the body of this dictation, please contact the provider for clarification

## 2024-11-14 PROCEDURE — 99232 SBSQ HOSP IP/OBS MODERATE 35: CPT | Performed by: STUDENT IN AN ORGANIZED HEALTH CARE EDUCATION/TRAINING PROGRAM

## 2024-11-14 RX ORDER — CLONAZEPAM 0.5 MG/1
0.5 TABLET ORAL DAILY
Status: DISCONTINUED | OUTPATIENT
Start: 2024-11-14 | End: 2024-11-14

## 2024-11-14 RX ORDER — CLONAZEPAM 0.5 MG/1
0.5 TABLET ORAL DAILY
Status: DISCONTINUED | OUTPATIENT
Start: 2024-11-15 | End: 2024-12-30 | Stop reason: HOSPADM

## 2024-11-14 RX ORDER — CLONAZEPAM 1 MG/1
1 TABLET ORAL
Status: DISCONTINUED | OUTPATIENT
Start: 2024-11-14 | End: 2024-12-30 | Stop reason: HOSPADM

## 2024-11-14 RX ADMIN — VALPROIC ACID 1250 MG: 500 SOLUTION ORAL at 21:37

## 2024-11-14 RX ADMIN — SENNOSIDES AND DOCUSATE SODIUM 2 TABLET: 8.6; 5 TABLET ORAL at 21:34

## 2024-11-14 RX ADMIN — CLOZAPINE 900 MG: 200 TABLET ORAL at 21:34

## 2024-11-14 RX ADMIN — LORAZEPAM 0.5 MG: 0.5 TABLET ORAL at 08:17

## 2024-11-14 RX ADMIN — TRAZODONE HYDROCHLORIDE 50 MG: 50 TABLET ORAL at 21:34

## 2024-11-14 RX ADMIN — FAMOTIDINE 20 MG: 20 TABLET ORAL at 17:24

## 2024-11-14 RX ADMIN — MELATONIN TAB 3 MG 3 MG: 3 TAB at 21:34

## 2024-11-14 RX ADMIN — ATORVASTATIN CALCIUM 10 MG: 10 TABLET, FILM COATED ORAL at 08:17

## 2024-11-14 RX ADMIN — CYANOCOBALAMIN TAB 1000 MCG 1000 MCG: 1000 TAB at 08:18

## 2024-11-14 RX ADMIN — PRAZOSIN HYDROCHLORIDE 5 MG: 5 CAPSULE ORAL at 21:33

## 2024-11-14 RX ADMIN — POLYETHYLENE GLYCOL 3350 17 G: 17 POWDER, FOR SOLUTION ORAL at 08:18

## 2024-11-14 RX ADMIN — CARVEDILOL 6.25 MG: 6.25 TABLET, FILM COATED ORAL at 16:53

## 2024-11-14 RX ADMIN — Medication 15 ML: at 08:18

## 2024-11-14 RX ADMIN — FAMOTIDINE 20 MG: 20 TABLET ORAL at 08:17

## 2024-11-14 RX ADMIN — FLUTICASONE FUROATE 1 PUFF: 100 POWDER RESPIRATORY (INHALATION) at 08:18

## 2024-11-14 RX ADMIN — CARVEDILOL 6.25 MG: 6.25 TABLET, FILM COATED ORAL at 08:17

## 2024-11-14 RX ADMIN — CLONAZEPAM 1 MG: 1 TABLET ORAL at 21:33

## 2024-11-14 NOTE — PLAN OF CARE
Problem: Alteration in Thoughts and Perception  Goal: Attend and participate in unit activities, including therapeutic, recreational, and educational groups  Description: Interventions:  -Encourage Visitation and family involvement in care  Outcome: Not Progressing     Problem: Prexisting or High Potential for Compromised Skin Integrity  Goal: Skin integrity is maintained or improved  Description: INTERVENTIONS:  - Identify patients at risk for skin breakdown  - Assess and monitor skin integrity  - Assess and monitor nutrition and hydration status  - Monitor labs   - Assess for incontinence   - Turn and reposition patient  - Assist with mobility/ambulation  - Relieve pressure over bony prominences  - Avoid friction and shearing  - Provide appropriate hygiene as needed including keeping skin clean and dry  - Evaluate need for skin moisturizer/barrier cream  - Collaborate with interdisciplinary team   - Patient/family teaching  - Consider wound care consult   Outcome: Progressing     Problem: Alteration in Thoughts and Perception  Goal: Verbalize thoughts and feelings  Description: Interventions:  - Promote a nonjudgmental and trusting relationship with the patient through active listening and therapeutic communication  - Assess patient's level of functioning, behavior and potential for risk  - Engage patient in 1 on 1 interactions  - Encourage patient to express fears, feelings, frustrations, and discuss symptoms    - [place] patient to reality, help patient recognize reality-based thinking   - Administer medications as ordered and assess for potential side effects  - Provide the patient education related to the signs and symptoms of the illness and desired effects of prescribed medications  Outcome: Progressing  Goal: Agree to be compliant with medication regime, as prescribed and report medication side effects  Description: Interventions:  - Offer appropriate PRN medication and supervise ingestion; conduct AIMS,  as needed   Outcome: Progressing

## 2024-11-14 NOTE — NURSING NOTE
Patient withdrawn to her room and listening to music. She is cooperative and compliant with medications. Patient denies all psych s/s.

## 2024-11-14 NOTE — CASE MANAGEMENT
11/14/24   Team Meeting   Meeting Type Daily Rounds   Initial Conference Date 11/14/24   Team Members Present   Team Members Present Physician;Nurse;;;Occupational Therapist   Physician Team Member Dr. Banuelos / Dr. Hurst / Dr. Bland / JAIR Ziegler   Nursing Team Member David   Care Management Team Member Tee / Manju   Social Work Team Member Anastacio   OT Team Member Eduardo   Patient/Family Present   Patient Present No   Patient's Family Present No       Denies everything. Atarax 50 given at 2pm yesterday as Pt seemed anxious per nursing staff. Pt did not attend groups. Provider discussed atavan to be switched to klonapin.

## 2024-11-14 NOTE — TREATMENT TEAM
11/14/24 1100   Activity/Group Checklist   Group Nursing Education   Attendance Did not attend

## 2024-11-14 NOTE — PROGRESS NOTES
Progress Note - Behavioral Health   Name: Meli Nowak 59 y.o. female I MRN: [id number]  Unit/Bed#: [facility] 6[facility]-02 I Date of Admission: 7/23/2024   Date of Service: 11/14/2024 I Hospital Day: 114     Assessment & Plan  Schizoaffective disorder, bipolar type (HCC)   Depakote to 1250 mg nightly  Most recent VPA level 54 on 10/12/24  CMP on 10/12/24 reviewed, WNL  Clozaril 900 mg nightly for schizoaffective disorder  Clozaril monitoring:   CRP, CBC/diff, CK QMon for monitoring  ANC 11/11/2024-4.10  VSS  Most recent ECG reviewed 9/9/24 - normal ECG, NSR  Clozaril increased to 900 mg nightly on 10/29/24 - Clozaril level 894 on 11/04/24   Risperdal discontinued l on 10/28/24 for moderate affectivity  Continue Ativan 0.5 mg twice daily for anxiety   Continue melatonin 3 mg nightly for insomnia  Continue prazosin 5 mg nightly for PTSD associated nightmares; doses to be adjusted as indicated   Continue trazodone 50 mg nightly for insomnia  Discharge disposition: [facility]      ASHLIE (obstructive sleep apnea)  - f/u w/ Sleep Medicine in outpatient setting and consider CPAP as indicated      Plan   Progress Toward Goals:   Meli is progressing towards goals of inpatient psychiatric treatment by continued medication compliance and is attending therapeutic modalities on the milieu. However, the patient continues to require inpatient psychiatric hospitalization for continued medication management and titration to optimize symptom reduction, improve sleep hygiene, and demonstrate adequate self-care.    Recommended Treatment: Treatment plan and medication changes discussed and per the attending physician the plan is:    1.Continue with group therapy, milieu therapy and occupational therapy  2.Behavioral Health checks every 7 minutes  3.Continue frequent safety checks and vitals per unit protocol  4.Continue with SLIM medical management as indicated  5.Continue with current medication regimen  6.Will review labs in the a.m.  7.Disposition  Planning: Discharge planning and efforts remain ongoing    Behavioral Health Medications: all current active meds have been reviewed and continue current psychiatric medications.  Current Facility-Administered Medications   Medication Dose Route Frequency Provider Last Rate    acetaminophen  650 mg Oral Q4H PRN Marie Ziegler, CRNP      acetaminophen  650 mg Oral Q4H PRN Marie Ziegler, CRNP      acetaminophen  975 mg Oral Q6H PRN Marie Ziegler, CRNP      albuterol  2 puff Inhalation Q4H PRN Kristen Logan, CRNP      aluminum-magnesium hydroxide-simethicone  30 mL Oral Q4H PRN Parris Bolanos, CRNP      Artificial Tears  1 drop Both Eyes Q6H PRN Dereje Banuelos MD      atorvastatin  10 mg Oral Daily Marie Ziegler, CRNP      bisacodyl  10 mg Oral Daily PRN Kristen Logan, CRNP      bisacodyl  10 mg Rectal Daily PRN Marie Ziegler, CRNP      carvedilol  6.25 mg Oral BID With Meals Marie Ziegler, JOSE ELIAS      [START ON 11/15/2024] clonazePAM  0.5 mg Oral Daily Marie Ziegler, CRNP      clonazePAM  1 mg Oral HS Marie Ziegler, CRNP      cloZAPine  900 mg Oral HS Marie Ziegler, CRNP      cyanocobalamin  1,000 mcg Oral Daily Marie Ziegler, CRNP      Diclofenac Sodium  2 g Topical 4x Daily PRN Marie Ziegler, CRNP      famotidine  20 mg Oral BID Shan Fitzgerald, DO      fluticasone  1 puff Inhalation Daily Marie Ziegler, CRNP      hydrOXYzine HCL  25 mg Oral Q6H PRN Max 4/day Marie Ziegler, CRNP      hydrOXYzine HCL  50 mg Oral Q6H PRN Max 4/day Marie Ziegler, CRNP      LORazepam  1 mg Intramuscular Q6H PRN Max 3/day Marie Ziegler, CRNP      LORazepam  1 mg Oral Q6H PRN Max 3/day Marie Ziegler, CRNP      melatonin  3 mg Oral HS Marie Ziegler, CRNP      mirtazapine  7.5 mg Oral HS PRN Marie Ziegler, CRNP      Multivitamin  15 mL Oral Daily Marie Ziegler, CRNP      OLANZapine  2.5 mg Oral Q6H PRN Dereje Banuelos MD      Or    OLANZapine  2.5 mg Intramuscular Q6H PRN Dereje  "MD Vin      OLANZapine  5 mg Oral Q6H PRN Dereje Banuelos MD      Or    OLANZapine  5 mg Intramuscular Q6H PRN Dereje Banuelos MD      ondansetron  4 mg Oral Q6H PRN Darin Lawton MD      polyethylene glycol  17 g Oral Daily Kristen Ludwig Doreen, JOSE ELIAS      polyethylene glycol  17 g Oral Daily PRN JOSE ELIAS Figueroa      prazosin  5 mg Oral HS Marie Ziegler, JOSE ELIAS      senna-docusate sodium  2 tablet Oral HS Rehana Borrego PA-C      traZODone  50 mg Oral HS JOSE ELIAS Figueroa      valproic acid  1,250 mg Oral HS JOSE ELIAS Figueroa         Risks / Benefits of Treatment:  Risks, benefits, and possible side effects of medications explained to patient and patient verbalizes understanding and agreement for treatment.       Subjective    Patient was seen today for continuation of care, records reviewed and patient was discussed with the morning case review team.    Meli was seen today for psychiatric follow-up.  On assessment today, Meli was cooperative but guarded. Reporting \"Holy voices\", she is unable to further elaborate when questioned. Directly questioned about nature, she states that they are positive and often tell her jokes. Less active today, she continues to report increased anxiety in the afternoon and has utilized prn Atarax on 11/3,11/9 and 11/13. Will change PO ativan to Klonopin to assist with symptoms. Sleep and appetite unchanged. Patient is still paranoid and somatically/religiously preoccupied but is more redirectable. Sleep and appetite remain unchanged. Tentative Discharge ongoing as transfer to an unsupervised setting will represent a significant deterioration in ability to function and present a severe risk to the patients physical and mental health.  Patient cannot be safety treated at a lower level of care and requires further psychiatric evaluation, medication treatment, other treatment which can be reasonably be provided only in a psychiatric hospital. Awaiting " discharge to [facility].      Meli denies acute suicidal/self-harm ideation/intent/plan upon direct inquiry at this time.  Meli remains behaviorally appropriate, no agitation or aggression noted on exam or in report.   Impulse control is intact.  Meli remains adherent to her current psychotropic medication regimen and denies any side effects from medications, as well as none noted on exam.    Psychiatric Review of Systems:  Behavior over the last 24 hours:  unchanged.   Sleep: good  Appetite: good  Medication side effects: none  ROS: no complaints, denies shortness of breath or chest pain and all other systems are negative for acute changes        Objective    Vitals:  Vitals:    11/14/24 0758   BP:    Pulse: (P) 82   Resp: (P) 18   Temp: (P) 98.4 °F (36.9 °C)   SpO2: (P) 93%       Laboratory Results:  I have personally reviewed all pertinent laboratory/tests results.  Most Recent Labs:   Lab Results   Component Value Date    WBC 9.25 11/11/2024    RBC 4.44 11/11/2024    HGB 14.0 11/11/2024    HCT 44.1 11/11/2024     11/11/2024    RDW 13.6 11/11/2024    NEUTROABS 4.10 11/11/2024    SODIUM 138 10/12/2024    K 4.1 10/12/2024     10/12/2024    CO2 29 10/12/2024    BUN 18 10/12/2024    CREATININE 0.97 10/12/2024    GLUC 146 (H) 10/12/2024    GLUF 98 08/10/2024    CALCIUM 9.2 10/12/2024    AST 12 (L) 10/12/2024    ALT 12 10/12/2024    ALKPHOS 84 10/12/2024    TP 6.7 10/12/2024    ALB 3.9 10/12/2024    TBILI 0.26 10/12/2024    VALPROICTOT 54 10/12/2024    AMMONIA 32 07/03/2024    [id number] 2.000 07/24/2024    HGBA1C 6.4 (H) 05/03/2024     05/03/2024       Mental Status Evaluation:  Appearance:  disheveled, marginal hygiene   Behavior:  cooperative, calm   Speech:  normal rate and volume   Mood:  less anxious, less depressed   Affect:  constricted   Thought Process:  illogical, perseverative, concrete   Thought Content:  Restorationism and somatic preoccupation, paranoid ideation   Perceptual Disturbances:  auditory hallucinations still present, but less frequent   Risk Potential: Suicidal ideation - None  Homicidal ideation - None  Potential for aggression - No   Memory:  recent and remote memory grossly intact   Sensorium  person, place, and time/date      Consciousness:  alert and awake   Attention: attention span and concentration are age appropriate   Insight:  limited   Judgment: limited   Gait/Station: normal gait/station   Motor Activity: no abnormal movements       Counseling / Coordination of Care:  Patient's progress reviewed with nursing staff.  Medications, treatment progress and treatment plan reviewed with patient.  Supportive counseling provided to the patient.    This note was completed in part utilizing Dragon dictation Software. Grammatical, translation, syntax errors, random word insertions, spelling mistakes, and incomplete sentences may be an occasional consequence of this system secondary to software limitations with voice recognition, ambient noise, and hardware issues. If you have any questions or concerns about the content, text, or information contained within the body of this dictation, please contact the provider for clarification

## 2024-11-14 NOTE — NURSING NOTE
Pt is calm and cooperative with care . Pt is visible on the unit for meals otherwise isolative to room. Pt is medications and meals compliant.  Pt currently denies all psych s/s. Will maintain q 15 mins checks.

## 2024-11-15 PROCEDURE — 99232 SBSQ HOSP IP/OBS MODERATE 35: CPT | Performed by: STUDENT IN AN ORGANIZED HEALTH CARE EDUCATION/TRAINING PROGRAM

## 2024-11-15 RX ADMIN — PRAZOSIN HYDROCHLORIDE 5 MG: 5 CAPSULE ORAL at 21:20

## 2024-11-15 RX ADMIN — ATORVASTATIN CALCIUM 10 MG: 10 TABLET, FILM COATED ORAL at 08:09

## 2024-11-15 RX ADMIN — CARVEDILOL 6.25 MG: 6.25 TABLET, FILM COATED ORAL at 08:09

## 2024-11-15 RX ADMIN — SENNOSIDES AND DOCUSATE SODIUM 2 TABLET: 8.6; 5 TABLET ORAL at 21:20

## 2024-11-15 RX ADMIN — VALPROIC ACID 1250 MG: 500 SOLUTION ORAL at 21:20

## 2024-11-15 RX ADMIN — FAMOTIDINE 20 MG: 20 TABLET ORAL at 08:10

## 2024-11-15 RX ADMIN — TRAZODONE HYDROCHLORIDE 50 MG: 50 TABLET ORAL at 21:20

## 2024-11-15 RX ADMIN — Medication 15 ML: at 08:10

## 2024-11-15 RX ADMIN — POLYETHYLENE GLYCOL 3350 17 G: 17 POWDER, FOR SOLUTION ORAL at 08:10

## 2024-11-15 RX ADMIN — CYANOCOBALAMIN TAB 1000 MCG 1000 MCG: 1000 TAB at 08:10

## 2024-11-15 RX ADMIN — CARVEDILOL 6.25 MG: 6.25 TABLET, FILM COATED ORAL at 17:22

## 2024-11-15 RX ADMIN — FLUTICASONE FUROATE 1 PUFF: 100 POWDER RESPIRATORY (INHALATION) at 08:55

## 2024-11-15 RX ADMIN — FAMOTIDINE 20 MG: 20 TABLET ORAL at 17:22

## 2024-11-15 RX ADMIN — MELATONIN TAB 3 MG 3 MG: 3 TAB at 21:20

## 2024-11-15 RX ADMIN — CLOZAPINE 900 MG: 200 TABLET ORAL at 21:20

## 2024-11-15 RX ADMIN — CLONAZEPAM 0.5 MG: 0.5 TABLET ORAL at 08:10

## 2024-11-15 RX ADMIN — CLONAZEPAM 1 MG: 1 TABLET ORAL at 21:20

## 2024-11-15 NOTE — TREATMENT TEAM
11/15/24 0915 11/15/24 1000 11/15/24 1330   Activity/Group Checklist   Group Exercise Community meeting Music   Attendance Did not attend Attended Did not attend   Attendance Duration (min)  --  31-45  --    Interactions  --  Other (Comment)  (mainly observed, asking for music)  --    Affect/Mood  --  Appropriate  --    Goals Achieved  --  Identified feelings;Identified triggers;Identified relapse prevention strategies;Able to listen to others;Able to engage in interactions;Able to reflect/comment on own behavior;Able to manage/cope with feelings;Verbalized increased hopefulness;Able to self-disclose  --

## 2024-11-15 NOTE — PROGRESS NOTES
11/15/24 1100   Activity/Group Checklist   Group Cognitive therapy  (buzz word game.)   Attendance Attended   Attendance Duration (min) 0-15   Interactions Did not interact   Affect/Mood Calm   Goals Achieved Able to listen to others

## 2024-11-15 NOTE — PLAN OF CARE
Problem: Alteration in Thoughts and Perception  Goal: Treatment Goal: Gain control of psychotic behaviors/thinking, reduce/eliminate presenting symptoms and demonstrate improved reality functioning upon discharge  Outcome: Progressing     Problem: Alteration in Thoughts and Perception  Goal: Verbalize thoughts and feelings  Description: Interventions:  - Promote a nonjudgmental and trusting relationship with the patient through active listening and therapeutic communication  - Assess patient's level of functioning, behavior and potential for risk  - Engage patient in 1 on 1 interactions  - Encourage patient to express fears, feelings, frustrations, and discuss symptoms    - [place] patient to reality, help patient recognize reality-based thinking   - Administer medications as ordered and assess for potential side effects  - Provide the patient education related to the signs and symptoms of the illness and desired effects of prescribed medications  Outcome: Progressing     Problem: Alteration in Thoughts and Perception  Goal: Recognize dysfunctional thoughts, communicate reality-based thoughts at the time of discharge  Description: Interventions:  - Provide medication and psycho-education to assist patient in compliance and developing insight into his/her illness   Outcome: Progressing

## 2024-11-15 NOTE — NURSING NOTE
"Patient visible throughout milieu, interacting appropriately with staff and peers. Patient stated \"I don't know yet\" when asked how she felt. Patient denied SI/HI/AVH. Patient displays steady gait. Patient utilizing radio as coping skill with positive effect. Patient able to and encouraged to inform staff of any needs.   "

## 2024-11-15 NOTE — CASE MANAGEMENT
11/15/24   Team Meeting   Meeting Type Daily Rounds   Team Members Present   Team Members Present Physician;Nurse;;Occupational Therapist;   Physician Team Member Dr. Banuelos / Dr. Hurst / Dr. Bland / JAIR Ziegler   Nursing Team Member David   Care Management Team Member Tee / Manju   Social Work Team Member Anastacio   OT Team Member Eduardo   Patient/Family Present   Patient Present No   Patient's Family Present No     Pt is reported to have slept. Pt is reported to have heard voices yesterday and appeared more paranoid.

## 2024-11-15 NOTE — NURSING NOTE
Patient is alert and oriented able to verbalize her needs. She is isolative to self in room visible only for meals and snacks. Patient denies all psych s/s. No behaviors observed thus far. Safety checks ongoing.

## 2024-11-15 NOTE — PROGRESS NOTES
Progress Note - Behavioral Health   Name: Meli Nowak 59 y.o. female I MRN: [id number]  Unit/Bed#: [facility] 651-02 I Date of Admission: 7/23/2024   Date of Service: 11/15/2024 I Hospital Day: 115     Assessment & Plan  Schizoaffective disorder, bipolar type (HCC)   Depakote to 1250 mg nightly  Most recent VPA level 54 on 10/12/24  CMP on 10/12/24 reviewed, WNL  Clozaril 900 mg nightly for schizoaffective disorder  Clozaril monitoring:   CRP, CBC/diff, CK QMon for monitoring  ANC 11/11/2024-4.10  VSS  Most recent ECG reviewed 9/9/24 - normal ECG, NSR  Clozaril increased to 900 mg nightly on 10/29/24 - Clozaril level 894 on 11/04/24   Risperdal discontinued l on 10/28/24 for moderate affectivity  Ativan changed to Klonopin 0.5 mg daily/ 1 mg qhs    Continue melatonin 3 mg nightly for insomnia  Continue prazosin 5 mg nightly for PTSD associated nightmares; doses to be adjusted as indicated   Continue trazodone 50 mg nightly for insomnia  Discharge disposition: CRR      ASHLIE (obstructive sleep apnea)  - f/u w/ Sleep Medicine in outpatient setting and consider CPAP as indicated      Plan   Progress Toward Goals:   Meli is progressing towards goals of inpatient psychiatric treatment by continued medication compliance and is attending therapeutic modalities on the milieu. However, the patient continues to require inpatient psychiatric hospitalization for continued medication management and titration to optimize symptom reduction, improve sleep hygiene, and demonstrate adequate self-care.    Recommended Treatment: Treatment plan and medication changes discussed and per the attending physician the plan is:    1.Continue with group therapy, milieu therapy and occupational therapy  2.Behavioral Health checks every 7 minutes  3.Continue frequent safety checks and vitals per unit protocol  4.Continue with [facility] medical management as indicated  5.Continue with current medication regimen  6.Will review labs in the a.m.  7.Disposition  Planning: Discharge planning and efforts remain ongoing    Behavioral Health Medications: all current active meds have been reviewed and continue current psychiatric medications.  Current Facility-Administered Medications   Medication Dose Route Frequency Provider Last Rate    acetaminophen  650 mg Oral Q4H PRN Marie Ziegler, CRNP      acetaminophen  650 mg Oral Q4H PRN Marie Ziegler, CRNP      acetaminophen  975 mg Oral Q6H PRN Marie Ziegler, CRNP      albuterol  2 puff Inhalation Q4H PRN Kristen Logan, CRNP      aluminum-magnesium hydroxide-simethicone  30 mL Oral Q4H PRN Parris Bolanos, CRNP      Artificial Tears  1 drop Both Eyes Q6H PRN Dereje Banuelos MD      atorvastatin  10 mg Oral Daily Marie Ziegler, CRNP      bisacodyl  10 mg Oral Daily PRN Kristen Logan, CRNP      bisacodyl  10 mg Rectal Daily PRN Marie Ziegler, CRNP      carvedilol  6.25 mg Oral BID With Meals Marie Ziegler, CRNP      clonazePAM  0.5 mg Oral Daily Marie Ziegler, CRNP      clonazePAM  1 mg Oral HS Marie Ziegler, CRNP      cloZAPine  900 mg Oral HS Marie Ziegler, CRNP      cyanocobalamin  1,000 mcg Oral Daily Marie Ziegler, CRNP      Diclofenac Sodium  2 g Topical 4x Daily PRN Marie Ziegler, CRNP      famotidine  20 mg Oral BID Shan Fitzgerald DO      fluticasone  1 puff Inhalation Daily Marie Ziegler, CRNP      hydrOXYzine HCL  25 mg Oral Q6H PRN Max 4/day Marie Ziegler, CRNP      hydrOXYzine HCL  50 mg Oral Q6H PRN Max 4/day Marie Ziegler, CRNP      LORazepam  1 mg Intramuscular Q6H PRN Max 3/day Marie Ziegler, CRNP      LORazepam  1 mg Oral Q6H PRN Max 3/day Marie Ziegler, CRNP      melatonin  3 mg Oral HS Marie Ziegler, CRNP      mirtazapine  7.5 mg Oral HS PRN Marie Ziegler, CRNP      Multivitamin  15 mL Oral Daily Marie Ziegler, CRNP      OLANZapine  2.5 mg Oral Q6H PRN Dereje Banuelos MD      Or    OLANZapine  2.5 mg Intramuscular Q6H PRN Dereje Banuelos MD      OLANZapine   "5 mg Oral Q6H PRN Dereje Banuelos MD      Or    OLANZapine  5 mg Intramuscular Q6H PRN Dereje Banuelos MD      ondansetron  4 mg Oral Q6H PRN Darin Lawton MD      polyethylene glycol  17 g Oral Daily Kristen Ludwig Doreen, JOSE ELIAS      polyethylene glycol  17 g Oral Daily PRN JOSE ELIAS Figueroa      prazosin  5 mg Oral HS JOSE ELIAS Figueroa      senna-docusate sodium  2 tablet Oral HS Rehana Borrego PA-C      traZODone  50 mg Oral HS JOSE ELIAS Figueroa      valproic acid  1,250 mg Oral HS JOSE ELIAS Figueroa         Risks / Benefits of Treatment:  Risks, benefits, and possible side effects of medications explained to patient and patient verbalizes understanding and agreement for treatment.       Subjective    Patient was seen today for continuation of care, records reviewed and patient was discussed with the morning case review team. Meli was seen today for psychiatric follow-up.  On assessment today, Meli was pleasant and ambulating the hallway. Smiling during interview today, patient voices her desire to take her cat to the park and her favorite cartoon \" A diogo story\". Mood stated as \"good\", AH chronic and fixed. Patient is not bothered and states she rather enjoys them at times. Somatic preoccupation more vocalized today, Meli states that she believes she has asthma and should probably go to the doctor when she is discharged. Assurance provided and patient made aware that [facility] continues to monitor and vital signs are stable. Labs to be obtained on Monday for scheduled Clozaril, patient remains bizarre at time with paranoid ideations and somatic/ Mandaeism preoccupation. Medications tolerated well, LBM 11/15/2024. Sleep and appetite are well.      Meli denies acute suicidal/self-harm ideation/intent/plan upon direct inquiry at this time.  Meli remains behaviorally appropriate, no agitation or aggression noted on exam or in report. Impulse control is intact.  Meli remains adherent to her " current psychotropic medication regimen and denies any side effects from medications, as well as none noted on exam.    Meli is currently assessed as being at their baseline with continued need for medication management, supervision for safety and ADL’s. These services are not currently available in a less restrictive environment necessitating continued hospital stay.  Meli should remain on the unit until these services are available, due to likelihood of mental decompensation and readmission if discharged to an unsupervised setting.  Assertive discharge planning and collaboration with multiple providers (inpatient and community based) remains ongoing.  Meli is currently awaiting for CRR.      Psychiatric Review of Systems:  Behavior over the last 24 hours:  unchanged.   Sleep: better  Appetite: good  Medication side effects: none  ROS: no complaints, denies shortness of breath or chest pain and all other systems are negative for acute changes        Objective    Vitals:  Vitals:    11/15/24 0739   BP: 129/62   Pulse: 86   Resp: 17   Temp: 97.5 °F (36.4 °C)   SpO2: 95%       Laboratory Results:  I have personally reviewed all pertinent laboratory/tests results.  Most Recent Labs:   Lab Results   Component Value Date    WBC 9.25 11/11/2024    RBC 4.44 11/11/2024    HGB 14.0 11/11/2024    HCT 44.1 11/11/2024     11/11/2024    RDW 13.6 11/11/2024    NEUTROABS 4.10 11/11/2024    SODIUM 138 10/12/2024    K 4.1 10/12/2024     10/12/2024    CO2 29 10/12/2024    BUN 18 10/12/2024    CREATININE 0.97 10/12/2024    GLUC 146 (H) 10/12/2024    GLUF 98 08/10/2024    CALCIUM 9.2 10/12/2024    AST 12 (L) 10/12/2024    ALT 12 10/12/2024    ALKPHOS 84 10/12/2024    TP 6.7 10/12/2024    ALB 3.9 10/12/2024    TBILI 0.26 10/12/2024    VALPROICTOT 54 10/12/2024    AMMONIA 32 07/03/2024    [id number] 2.000 07/24/2024    HGBA1C 6.4 (H) 05/03/2024     05/03/2024       Mental Status Evaluation:  Appearance:  disheveled,  marginal hygiene   Behavior:  cooperative, calm   Speech:  normal rate and volume   Mood:  less anxious, less depressed   Affect:  constricted   Thought Process:  perseverative, concrete   Thought Content:  Mormonism and somatic preoccupation, paranoid ideation   Perceptual Disturbances: auditory hallucinations still present, but less frequent   Risk Potential: Suicidal ideation - None  Homicidal ideation - None  Potential for aggression - No   Memory:  recent and remote memory grossly intact   Sensorium  person, place, and time/date      Consciousness:  alert and awake   Attention: attention span and concentration are age appropriate   Insight:  limited   Judgment: limited   Gait/Station: normal gait/station   Motor Activity: no abnormal movements       Counseling / Coordination of Care:  Patient's progress reviewed with nursing staff.  Medications, treatment progress and treatment plan reviewed with patient.  Supportive counseling provided to the patient.    This note was completed in part utilizing Dragon dictation Software. Grammatical, translation, syntax errors, random word insertions, spelling mistakes, and incomplete sentences may be an occasional consequence of this system secondary to software limitations with voice recognition, ambient noise, and hardware issues. If you have any questions or concerns about the content, text, or information contained within the body of this dictation, please contact the provider for clarification

## 2024-11-15 NOTE — ASSESSMENT & PLAN NOTE
Depakote to 1250 mg nightly  Most recent VPA level 54 on 10/12/24  CMP on 10/12/24 reviewed, WNL  Clozaril 900 mg nightly for schizoaffective disorder  Clozaril monitoring:   CRP, CBC/diff, CK QMon for monitoring  ANC 11/11/2024-4.10  VSS  Most recent ECG reviewed 9/9/24 - normal ECG, NSR  Clozaril increased to 900 mg nightly on 10/29/24 - Clozaril level 894 on 11/04/24   Risperdal discontinued l on 10/28/24 for moderate affectivity  Ativan changed to Klonopin 0.5 mg daily/ 1 mg qhs    Continue melatonin 3 mg nightly for insomnia  Continue prazosin 5 mg nightly for PTSD associated nightmares; doses to be adjusted as indicated   Continue trazodone 50 mg nightly for insomnia  Discharge disposition: CRR

## 2024-11-16 PROCEDURE — 99232 SBSQ HOSP IP/OBS MODERATE 35: CPT

## 2024-11-16 RX ADMIN — CARVEDILOL 6.25 MG: 6.25 TABLET, FILM COATED ORAL at 08:00

## 2024-11-16 RX ADMIN — CYANOCOBALAMIN TAB 1000 MCG 1000 MCG: 1000 TAB at 08:09

## 2024-11-16 RX ADMIN — PRAZOSIN HYDROCHLORIDE 5 MG: 5 CAPSULE ORAL at 21:02

## 2024-11-16 RX ADMIN — FAMOTIDINE 20 MG: 20 TABLET ORAL at 08:09

## 2024-11-16 RX ADMIN — TRAZODONE HYDROCHLORIDE 50 MG: 50 TABLET ORAL at 21:03

## 2024-11-16 RX ADMIN — FAMOTIDINE 20 MG: 20 TABLET ORAL at 17:05

## 2024-11-16 RX ADMIN — MELATONIN TAB 3 MG 3 MG: 3 TAB at 21:03

## 2024-11-16 RX ADMIN — POLYETHYLENE GLYCOL 3350 17 G: 17 POWDER, FOR SOLUTION ORAL at 08:07

## 2024-11-16 RX ADMIN — CLOZAPINE 900 MG: 200 TABLET ORAL at 21:02

## 2024-11-16 RX ADMIN — FLUTICASONE FUROATE 1 PUFF: 100 POWDER RESPIRATORY (INHALATION) at 08:07

## 2024-11-16 RX ADMIN — Medication 15 ML: at 08:09

## 2024-11-16 RX ADMIN — CLONAZEPAM 1 MG: 1 TABLET ORAL at 21:02

## 2024-11-16 RX ADMIN — CARVEDILOL 6.25 MG: 6.25 TABLET, FILM COATED ORAL at 15:56

## 2024-11-16 RX ADMIN — CLONAZEPAM 0.5 MG: 0.5 TABLET ORAL at 08:09

## 2024-11-16 RX ADMIN — ATORVASTATIN CALCIUM 10 MG: 10 TABLET, FILM COATED ORAL at 08:09

## 2024-11-16 RX ADMIN — SENNOSIDES AND DOCUSATE SODIUM 2 TABLET: 8.6; 5 TABLET ORAL at 21:01

## 2024-11-16 RX ADMIN — VALPROIC ACID 1250 MG: 500 SOLUTION ORAL at 21:04

## 2024-11-16 RX ADMIN — ACETAMINOPHEN 975 MG: 325 TABLET, FILM COATED ORAL at 13:24

## 2024-11-16 NOTE — NURSING NOTE
Patient is medication and meal compliant. No complaints of pain or discomfort. Patient is visible on the unit but somewhat isolative to self. Patient denies symptoms of depression or anxiety. Will continue to monitor patient for changes in mood or behavior.

## 2024-11-16 NOTE — NURSING NOTE
Pt calm and cooperative. Present in milieu at times interacting with peers. Medication and meal compliant. Denies SI/HI. Q 15 minute checks maintained.

## 2024-11-16 NOTE — ASSESSMENT & PLAN NOTE
- f/u w/ Sleep Medicine in outpatient setting and consider CPAP as indicated    No associated orders from this encounter found during lookback period of 72 hours.

## 2024-11-16 NOTE — PROGRESS NOTES
"Progress Note - Behavioral Health   Name: Meli Nowak 59 y.o. female I MRN: [id number]  Unit/Bed#: [facility] 6[facility]-02 I Date of Admission: 7/23/2024   Date of Service: 11/16/2024 I Hospital Day: 116     Assessment & Plan  Schizoaffective disorder, bipolar type (HCC)   Depakote to 1250 mg nightly  Most recent VPA level 54 on 10/12/24  CMP on 10/12/24 reviewed, WNL  Clozaril 900 mg nightly for schizoaffective disorder  Clozaril monitoring:   CRP, CBC/diff, CK QMon for monitoring  ANC 11/11/2024-4.10  VSS  Most recent ECG reviewed 9/9/24 - normal ECG, NSR  Clozaril increased to 900 mg nightly on 10/29/24 - Clozaril level 894 on 11/04/24   Risperdal discontinued l on 10/28/24 for moderate affectivity  Ativan changed to Klonopin 0.5 mg daily/ 1 mg qhs    Continue melatonin 3 mg nightly for insomnia  Continue prazosin 5 mg nightly for PTSD associated nightmares; doses to be adjusted as indicated   Continue trazodone 50 mg nightly for insomnia  Discharge disposition: CRR    ASHLIE (obstructive sleep apnea)  - f/u w/ Sleep Medicine in outpatient setting and consider CPAP as indicated      Subjective: I saw Meli for follow up and continuation of care. I have reviewed the chart and discussed progress with the treatment team. Patient is calm, cooperative, visible, and social. Denies all psych symptoms but is noted to be internally responding at times. She is medication and meal compliant.  She is attending some groups. She remains in good behavorial control. No PRNs in the last 24 hours.    On assessment, Meli is seen lying in bed and reports her mood is \"good\" today, affect congruent.  She denies depression, anxiety, suicidal/ homicidal ideations, plan, intent, self-injurious behaviors or urges. She complains of right leg pain but improving with increased ambulation and PRN pain medication. Meli suggests that she still has auditory hallucinations but is quick to minimize and states they have not been recently nor bothersome. [place] " "does not voice any paranoia or delusions, denies visual hallucinations and does not appear to be responding to internal stimuli during or prior to assessment.    Behavior over the last 24 hours: unchanged   Sleep: normal  Appetite: normal  Medication side effects: No   ROS: reports leg pain    Mental Status Evaluation:    Appearance:  age appropriate, casually dressed, dressed appropriately, adequate grooming, overweight, no distress   Behavior:  pleasant, cooperative, calm   Speech:  normal rate, normal volume, normal pitch   Mood:  \"Good\"   Affect:  mood-congruent   Thought Process:  logical, coherent, linear   Associations: intact associations   Thought Content:  Auditory hallucinations, not currently responding internally   Perceptual Disturbances: none   Risk Potential: Suicidal ideation - None  Homicidal ideation - None  Potential for aggression - No   Sensorium:  oriented to person, place, time/date, and situation   Memory:  recent and remote memory grossly intact   Consciousness:  alert and awake   Attention/Concentration: attention span and concentration are age appropriate   Insight:  limited   Judgment: fair   Gait/ Station: In bed   Motor movements: No abnormal movements       Suicide/Homicide Risk Assessment:  Risk of Harm to Self:   Nursing Suicide Risk Assessment Last 24 hours: C-SSRS Risk (Since Last Contact)  Calculated C-SSRS Risk Score (Since Last Contact): No Risk Indicated  Based on today's assessment, Meli presents the following risk of harm to self: none    Risk of Harm to Others:  Nursing Homicide Risk Assessment: Violence Risk to Others: Denies within past 6 months  Based on today's assessment, Meli presents the following risk of harm to others: none    Vital signs in last 24 hours:    Temp:  [97.5 °F (36.4 °C)-97.6 °F (36.4 °C)] 97.5 °F (36.4 °C)  HR:  [70-86] 79  BP: (116-135)/(59-68) 135/68  Resp:  [17-18] 17  SpO2:  [93 %-96 %] 93 %  O2 Device: None (Room air)    Current " Facility-Administered Medications   Medication Dose Route Frequency Provider Last Rate    acetaminophen  650 mg Oral Q4H PRN Marie Ziegler, CRNP      acetaminophen  650 mg Oral Q4H PRN Marie Ziegler, CRNP      acetaminophen  975 mg Oral Q6H PRN Marie Ziegler, CRNP      albuterol  2 puff Inhalation Q4H PRN Kristen Logan, CRNP      aluminum-magnesium hydroxide-simethicone  30 mL Oral Q4H PRN Parris Bolanos, CRNP      Artificial Tears  1 drop Both Eyes Q6H PRN Dereje Banuelos MD      atorvastatin  10 mg Oral Daily Marie Ziegler, CRNP      bisacodyl  10 mg Oral Daily PRN Kristen Logan, CRNP      bisacodyl  10 mg Rectal Daily PRN Marie Ziegler, CRNP      carvedilol  6.25 mg Oral BID With Meals Marie Ziegler, CRNP      clonazePAM  0.5 mg Oral Daily Marie Ziegler, CRNP      clonazePAM  1 mg Oral HS Marie Ziegler, CRNP      cloZAPine  900 mg Oral HS Marie Ziegler, CRNP      cyanocobalamin  1,000 mcg Oral Daily Marie Ziegler, CRNP      Diclofenac Sodium  2 g Topical 4x Daily PRN Marie Ziegler, CRNP      famotidine  20 mg Oral BID Shan Fitzgerald,       fluticasone  1 puff Inhalation Daily Marie Ziegler, CRNP      hydrOXYzine HCL  25 mg Oral Q6H PRN Max 4/day Marie Ziegler, CRNP      hydrOXYzine HCL  50 mg Oral Q6H PRN Max 4/day Marie Ziegler, CRNP      LORazepam  1 mg Intramuscular Q6H PRN Max 3/day Marie Ziegler, CRNP      LORazepam  1 mg Oral Q6H PRN Max 3/day Marie Ziegler, CRNP      melatonin  3 mg Oral HS Marie Ziegler, CRNP      mirtazapine  7.5 mg Oral HS PRN Marie Ziegler, CRNP      Multivitamin  15 mL Oral Daily Marie Ziegler, CRNP      OLANZapine  2.5 mg Oral Q6H PRN Dereje Banuelos MD      Or    OLANZapine  2.5 mg Intramuscular Q6H PRN Dereje Banuelos MD      OLANZapine  5 mg Oral Q6H PRN Dereje Banuelos MD      Or    OLANZapine  5 mg Intramuscular Q6H PRN Dereje Banuelos MD      ondansetron  4 mg Oral Q6H PRN Darin Lawton MD      polyethylene glycol   "17 g Oral Daily Kristenabdoulaye Ludwig Doreen, CRVALE      polyethylene glycol  17 g Oral Daily PRN Marie Ziegler, JOSE ELIAS      prazosin  5 mg Oral HS JOSE ELIAS Figueroa      senna-docusate sodium  2 tablet Oral HS Rehana Borrego PA-C      traZODone  50 mg Oral HS Marie Ziegler, JOSE ELIAS      valproic acid  1,250 mg Oral HS JOSE ELIAS Figueroa         Laboratory results: I have personally reviewed all pertinent laboratory/tests results    SS Progress Note Lab Results: Labs in last 72 hours: No results for input(s): \"WBC\", \"RBC\", \"HGB\", \"HCT\", \"PLT\", \"RDW\", \"TOTANEUTABS\", \"NEUTROABS\", \"SODIUM\", \"K\", \"CL\", \"CO2\", \"BUN\", \"CREATININE\", \"GLUC\", \"CALCIUM\", \"AST\", \"ALT\", \"ALKPHOS\", \"TP\", \"ALB\", \"TBILI\", \"CHOLESTEROL\", \"HDL\", \"TRIG\", \"LDLCALC\", \"VALPROICTOT\", \"CARBAMAZEPIN\", \"LITHIUM\", \"AMMONIA\", \"[id number]\", \"FREET4\", \"T3FREE\", \"PREGTESTUR\", \"PREGSERUM\", \"HCG\", \"HCGQUANT\", \"SYPHILISAB\" in the last 72 hours.    Progress Toward Goals: progressing, placement pending    Risks / Benefits of Treatment:  Risks, benefits, and possible side effects of medications explained to patient. Patient has limited understanding of risks and benefits of treatment at this time, but agrees to take medications as prescribed.    Counseling / Coordination of Care:    Total floor / unit time spent today 25 minutes. Greater than 50% of total time was spent with the patient and / or family counseling and / or coordination of care. A description of counseling / coordination of care:  Patient's progress discussed with staff in treatment team meeting.  Medication changes reviewed with staff in treatment team meeting.  Medications, treatment progress and treatment plan reviewed with patient.  Importance of medication and treatment compliance reviewed with patient.  Cognitive techniques utilized during the session.  Reassurance and supportive therapy provided.  Encouraged participation in milieu and group therapy on the unit.      Chelsey Funez, " JOSE ELIAS 11/16/24

## 2024-11-16 NOTE — PLAN OF CARE
Problem: Alteration in Thoughts and Perception  Goal: Verbalize thoughts and feelings  Description: Interventions:  - Promote a nonjudgmental and trusting relationship with the patient through active listening and therapeutic communication  - Assess patient's level of functioning, behavior and potential for risk  - Engage patient in 1 on 1 interactions  - Encourage patient to express fears, feelings, frustrations, and discuss symptoms    - [place] patient to reality, help patient recognize reality-based thinking   - Administer medications as ordered and assess for potential side effects  - Provide the patient education related to the signs and symptoms of the illness and desired effects of prescribed medications  Outcome: Progressing     Problem: Ineffective Coping  Goal: Participates in unit activities  Description: Interventions:  - Provide therapeutic environment   - Provide required programming   - Redirect inappropriate behaviors   Outcome: Progressing     Problem: Risk for Self Injury/Neglect  Goal: Treatment Goal: Remain safe during length of stay, learn and adopt new coping skills, and be free of self-injurious ideation, impulses and acts at the time of discharge  Outcome: Progressing  Goal: Verbalize thoughts and feelings  Description: Interventions:  - Assess and re-assess patient's lethality and potential for self-injury  - Engage patient in 1:1 interactions, daily, for a minimum of 15 minutes  - Encourage patient to express feelings, fears, frustrations, hopes  - Establish rapport/trust with patient   Outcome: Progressing  Goal: Refrain from harming self  Description: Interventions:  - Monitor patient closely, per order  - Develop a trusting relationship  - Supervise medication ingestion, monitor effects and side effects   Outcome: Progressing  Goal: Attend and participate in unit activities, including therapeutic, recreational, and educational groups  Description: Interventions:  - Provide therapeutic  and educational activities daily, encourage attendance and participation, and document same in the medical record  - Obtain collateral information, encourage visitation and family involvement in care   Outcome: Progressing  Goal: Recognize maladaptive responses and adopt new coping mechanisms  Outcome: Progressing

## 2024-11-16 NOTE — ASSESSMENT & PLAN NOTE
Depakote to 1250 mg nightly  Most recent VPA level 54 on 10/12/24  CMP on 10/12/24 reviewed, WNL  Clozaril 900 mg nightly for schizoaffective disorder  Clozaril monitoring:   CRP, CBC/diff, CK QMon for monitoring  ANC 11/11/2024-4.10  VSS  Most recent ECG reviewed 9/9/24 - normal ECG, NSR  Clozaril increased to 900 mg nightly on 10/29/24 - Clozaril level 894 on 11/04/24   Risperdal discontinued l on 10/28/24 for moderate affectivity  Ativan changed to Klonopin 0.5 mg daily/ 1 mg qhs    Continue melatonin 3 mg nightly for insomnia  Continue prazosin 5 mg nightly for PTSD associated nightmares; doses to be adjusted as indicated   Continue trazodone 50 mg nightly for insomnia  Discharge disposition: CRR        No associated orders from this encounter found during lookback period of 72 hours.

## 2024-11-17 PROCEDURE — 99232 SBSQ HOSP IP/OBS MODERATE 35: CPT | Performed by: PHYSICIAN ASSISTANT

## 2024-11-17 RX ADMIN — TRAZODONE HYDROCHLORIDE 50 MG: 50 TABLET ORAL at 21:07

## 2024-11-17 RX ADMIN — FLUTICASONE FUROATE 1 PUFF: 100 POWDER RESPIRATORY (INHALATION) at 08:00

## 2024-11-17 RX ADMIN — FAMOTIDINE 20 MG: 20 TABLET ORAL at 17:24

## 2024-11-17 RX ADMIN — Medication 15 ML: at 08:00

## 2024-11-17 RX ADMIN — SENNOSIDES AND DOCUSATE SODIUM 2 TABLET: 8.6; 5 TABLET ORAL at 21:07

## 2024-11-17 RX ADMIN — MELATONIN TAB 3 MG 3 MG: 3 TAB at 21:07

## 2024-11-17 RX ADMIN — CLONAZEPAM 1 MG: 1 TABLET ORAL at 21:06

## 2024-11-17 RX ADMIN — CARVEDILOL 6.25 MG: 6.25 TABLET, FILM COATED ORAL at 07:50

## 2024-11-17 RX ADMIN — CYANOCOBALAMIN TAB 1000 MCG 1000 MCG: 1000 TAB at 08:00

## 2024-11-17 RX ADMIN — CLONAZEPAM 0.5 MG: 0.5 TABLET ORAL at 08:00

## 2024-11-17 RX ADMIN — VALPROIC ACID 1250 MG: 500 SOLUTION ORAL at 21:07

## 2024-11-17 RX ADMIN — FAMOTIDINE 20 MG: 20 TABLET ORAL at 08:00

## 2024-11-17 RX ADMIN — CLOZAPINE 900 MG: 200 TABLET ORAL at 21:06

## 2024-11-17 RX ADMIN — ATORVASTATIN CALCIUM 10 MG: 10 TABLET, FILM COATED ORAL at 08:00

## 2024-11-17 RX ADMIN — POLYETHYLENE GLYCOL 3350 17 G: 17 POWDER, FOR SOLUTION ORAL at 08:00

## 2024-11-17 RX ADMIN — PRAZOSIN HYDROCHLORIDE 5 MG: 5 CAPSULE ORAL at 21:06

## 2024-11-17 NOTE — NURSING NOTE
"Patient is medication compliant. Patient refused breakfast choosing to stay in bed and sleep. Patient denies symptoms of depression or anxiety. Patient does ask \"am I ok?\" And needs reassurance at times.  Patient is currently awaiting placement at this time. Will continue to monitor patient for changes in mood or behavior.  "

## 2024-11-17 NOTE — PLAN OF CARE
Problem: Alteration in Thoughts and Perception  Goal: Treatment Goal: Gain control of psychotic behaviors/thinking, reduce/eliminate presenting symptoms and demonstrate improved reality functioning upon discharge  Outcome: Progressing  Goal: Verbalize thoughts and feelings  Description: Interventions:  - Promote a nonjudgmental and trusting relationship with the patient through active listening and therapeutic communication  - Assess patient's level of functioning, behavior and potential for risk  - Engage patient in 1 on 1 interactions  - Encourage patient to express fears, feelings, frustrations, and discuss symptoms    - [place] patient to reality, help patient recognize reality-based thinking   - Administer medications as ordered and assess for potential side effects  - Provide the patient education related to the signs and symptoms of the illness and desired effects of prescribed medications  Outcome: Progressing  Goal: Refrain from acting on delusional thinking/internal stimuli  Description: Interventions:  - Monitor patient closely, per order   - Utilize least restrictive measures   - Set reasonable limits, give positive feedback for acceptable   - Administer medications as ordered and monitor of potential side effects  Outcome: Progressing  Goal: Agree to be compliant with medication regime, as prescribed and report medication side effects  Description: Interventions:  - Offer appropriate PRN medication and supervise ingestion; conduct AIMS, as needed   Outcome: Progressing  Goal: Attend and participate in unit activities, including therapeutic, recreational, and educational groups  Description: Interventions:  -Encourage Visitation and family involvement in care  Outcome: Progressing  Goal: Recognize dysfunctional thoughts, communicate reality-based thoughts at the time of discharge  Description: Interventions:  - Provide medication and psycho-education to assist patient in compliance and developing  insight into his/her illness   Outcome: Progressing  Goal: Complete daily ADLs, including personal hygiene independently, as able  Description: Interventions:  - Observe, teach, and assist patient with ADLS  - Monitor and promote a balance of rest/activity, with adequate nutrition and elimination   Outcome: Progressing     Problem: Anxiety  Goal: Anxiety is at manageable level  Description: Interventions:  - Assess and monitor patient's anxiety level.   - Monitor for signs and symptoms (heart palpitations, chest pain, shortness of breath, headaches, nausea, feeling jumpy, restlessness, irritable, apprehensive).   - Collaborate with interdisciplinary team and initiate plan and interventions as ordered.  - [place] patient to unit/surroundings  - Explain treatment plan  - Encourage participation in care  - Encourage verbalization of concerns/fears  - Identify coping mechanisms  - Assist in developing anxiety-reducing skills  - Administer/offer alternative therapies  - Limit or eliminate stimulants  Outcome: Progressing     Problem: Potential for Falls  Goal: Patient will remain free of falls  Description: INTERVENTIONS:  - Educate patient on patient safety including physical limitations  - Instruct patient to call for assistance with activity   - Consult OT/PT to assist with strengthening/mobility   - Keep Call bell within reach  - Keep bed low and locked with side rails adjusted as appropriate  - Keep care items and personal belongings within reach  - Initiate and maintain comfort rounds  - Offer Toileting every 2 Hours, in advance of need  - Initiate/Maintain bed and chair alarm  - Obtain necessary fall risk management equipment: walker, wheelchair  - Apply yellow socks and bracelet for high fall risk patients  - Patient moved to room near nurses station  Outcome: Progressing

## 2024-11-17 NOTE — PROGRESS NOTES
"Progress Note - Behavioral Health   Name: Meli Nowak 59 y.o. female I MRN: [id number]  Unit/Bed#: [facility] 651-02 I Date of Admission: 7/23/2024   Date of Service: 11/17/2024 I Hospital Day: 117     Assessment & Plan  Schizoaffective disorder, bipolar type (HCC)   Depakote to 1250 mg nightly  Most recent VPA level 54 on 10/12/24  CMP on 10/12/24 reviewed, WNL  Clozaril 900 mg nightly for schizoaffective disorder  Clozaril monitoring:   CRP, CBC/diff, CK QMon for monitoring  ANC 11/11/2024-4.10  VSS  Most recent ECG reviewed 9/9/24 - normal ECG, NSR  Clozaril increased to 900 mg nightly on 10/29/24 - Clozaril level 894 on 11/04/24   Risperdal discontinued l on 10/28/24 for moderate affectivity  Ativan changed to Klonopin 0.5 mg daily/ 1 mg qhs    Continue melatonin 3 mg nightly for insomnia  Continue prazosin 5 mg nightly for PTSD associated nightmares; doses to be adjusted as indicated   Continue trazodone 50 mg nightly for insomnia  Discharge disposition: CRR    ASHLIE (obstructive sleep apnea)  - f/u w/ Sleep Medicine in outpatient setting and consider CPAP as indicated    Progress Note - Behavioral Health     Meli Nowak 59 y.o. female MRN: [id number]   Unit/Bed#: [facility] 651-02 Encounter: [id number]    Behavior over the last 24 hours: unchanged.     Meli was seen and evaluated today. Per nursing, patient is medication and meal compliant. No complaints of pain or discomfort. Patient is visible on the unit but somewhat isolative to self. Patient denies symptoms of depression or anxiety.   Today patient states her mood is \"okay\". She shares that her depression is \"on and off\" and that she is anxious \"every day\". She shares that she and her weekly provider are working on stabilizing her symptoms and she feels her medications are helping.  In regard to medication tolerance, denies side effects.    Patient denies SI/HI/AH/VH. In regard to sleep and appetite, denies disturbances.  No acute events over the past 24H. " "      ROS: no complaints    Mental Status Evaluation:    Appearance:  age appropriate, casually dressed, dressed appropriately, adequate grooming, overweight, no distress   Behavior:  pleasant, cooperative, calm   Speech:  normal rate, normal volume, normal pitch   Mood:  \"Good\"   Affect:  mood-congruent   Thought Process:  logical, coherent, linear   Associations: intact associations   Thought Content:  Denies auditory hallucinations, not currently responding internally, though is noted responding at times   Perceptual Disturbances: none   Risk Potential: Suicidal ideation - None  Homicidal ideation - None  Potential for aggression - No   Sensorium:  oriented to person, place, time/date, and situation   Memory:  recent and remote memory grossly intact   Consciousness:  alert and awake   Attention/Concentration: attention span and concentration are age appropriate   Insight:  limited   Judgment: fair   Gait/ Station: In bed   Motor movements: No abnormal movements     Vital signs in last 24 hours:    Temp:  [97.9 °F (36.6 °C)-98 °F (36.7 °C)] 97.9 °F (36.6 °C)  HR:  [69-80] 69  BP: (106-162)/(52-72) 106/52  Resp:  [16-19] 17  SpO2:  [92 %-97 %] 97 %  O2 Device: None (Room air)    Laboratory results: I have personally reviewed all pertinent laboratory/tests results    Results from the past 24 hours: No results found for this or any previous visit (from the past 24 hours).    Progress Toward Goals: progressing    Assessment & Plan   Principal Problem:    Schizoaffective disorder, bipolar type (HCC)  Active Problems:    Medical clearance for psychiatric admission    Type 2 diabetes mellitus (HCC)    Obesity    Tobacco abuse    Hyperlipidemia    Esophageal reflux    Essential (primary) hypertension    Vitamin B12 deficiency    Hypoxia    Ambulatory dysfunction    ASHLIE (obstructive sleep apnea)      Recommended Treatment:     Planned medication and treatment changes:    All current active medications have been " reviewed  Encourage group therapy, milieu therapy and occupational therapy  Behavioral Health checks every 7 minutes  Continue current medications:    Current Facility-Administered Medications   Medication Dose Route Frequency Provider Last Rate    acetaminophen  650 mg Oral Q4H PRN Marie Ziegler, CRNP      acetaminophen  650 mg Oral Q4H PRN Marie Ziegler, CRNP      acetaminophen  975 mg Oral Q6H PRN Marie Ziegler, CRNP      albuterol  2 puff Inhalation Q4H PRN Kristen Logan, CRNP      aluminum-magnesium hydroxide-simethicone  30 mL Oral Q4H PRN Parris Bolanos, CRNP      Artificial Tears  1 drop Both Eyes Q6H PRN Dereje Banuelos MD      atorvastatin  10 mg Oral Daily Marie Ziegler, CRNP      bisacodyl  10 mg Oral Daily PRN Kristen Logan, CRNP      bisacodyl  10 mg Rectal Daily PRN Marie Ziegler, CRNP      carvedilol  6.25 mg Oral BID With Meals Marie Ziegler, CRNP      clonazePAM  0.5 mg Oral Daily Marie Ziegler, CRNP      clonazePAM  1 mg Oral HS Marie Ziegler, CRNP      cloZAPine  900 mg Oral HS Marie Ziegler, CRNP      cyanocobalamin  1,000 mcg Oral Daily Marie Ziegler, CRNP      Diclofenac Sodium  2 g Topical 4x Daily PRN Marie Ziegler, CRNP      famotidine  20 mg Oral BID Shan Fitzgerald,       fluticasone  1 puff Inhalation Daily Marie Ziegler, CRNP      hydrOXYzine HCL  25 mg Oral Q6H PRN Max 4/day Marie Ziegler, CRNP      hydrOXYzine HCL  50 mg Oral Q6H PRN Max 4/day Marie Ziegler, CRNP      LORazepam  1 mg Intramuscular Q6H PRN Max 3/day Marie Ziegler, CRNP      LORazepam  1 mg Oral Q6H PRN Max 3/day Marie Ziegler, CRNP      melatonin  3 mg Oral HS Marie Ziegler, CRNP      mirtazapine  7.5 mg Oral HS PRN Marie Ziegler, CRNP      Multivitamin  15 mL Oral Daily Marie Ziegler, CRNP      OLANZapine  2.5 mg Oral Q6H PRN Dereje Banuelos MD      Or    OLANZapine  2.5 mg Intramuscular Q6H PRN Dereje Banuelos MD      OLANZapine  5 mg Oral Q6H PRN Dereje  MD Vin      Or    OLANZapine  5 mg Intramuscular Q6H PRN Dereje Banuelos MD      ondansetron  4 mg Oral Q6H PRN Darin Lawton MD      polyethylene glycol  17 g Oral Daily JOSE ELIAS Ortega      polyethylene glycol  17 g Oral Daily PRN JOSE ELIAS Figueroa      prazosin  5 mg Oral HS JOSE ELIAS Figueroa      senna-docusate sodium  2 tablet Oral HS Rehana Borrego PA-C      traZODone  50 mg Oral HS JOSE ELIAS Figueroa      valproic acid  1,250 mg Oral HS JOSE ELIAS Figueroa       Risks / Benefits of Treatment:    Risks, benefits, and possible side effects of medications explained to patient and patient verbalizes understanding and agreement for treatment.    Counseling / Coordination of Care:    Patient's progress discussed with staff in treatment team meeting.  Medications, treatment progress and treatment plan reviewed with patient.    Ana Yanez PA-C 11/17/24

## 2024-11-18 LAB
BASOPHILS # BLD AUTO: 0.09 THOUSANDS/ΜL (ref 0–0.1)
BASOPHILS NFR BLD AUTO: 1 % (ref 0–1)
CK SERPL-CCNC: 29 U/L (ref 26–192)
CRP SERPL QL: 1.6 MG/L
EOSINOPHIL # BLD AUTO: 0 THOUSAND/ΜL (ref 0–0.61)
EOSINOPHIL NFR BLD AUTO: 0 % (ref 0–6)
ERYTHROCYTE [DISTWIDTH] IN BLOOD BY AUTOMATED COUNT: 13.3 % (ref 11.6–15.1)
HCT VFR BLD AUTO: 41.9 % (ref 34.8–46.1)
HGB BLD-MCNC: 13.1 G/DL (ref 11.5–15.4)
IMM GRANULOCYTES # BLD AUTO: 0.02 THOUSAND/UL (ref 0–0.2)
IMM GRANULOCYTES NFR BLD AUTO: 0 % (ref 0–2)
LYMPHOCYTES # BLD AUTO: 4.07 THOUSANDS/ΜL (ref 0.6–4.47)
LYMPHOCYTES NFR BLD AUTO: 51 % (ref 14–44)
MCH RBC QN AUTO: 31.1 PG (ref 26.8–34.3)
MCHC RBC AUTO-ENTMCNC: 31.3 G/DL (ref 31.4–37.4)
MCV RBC AUTO: 100 FL (ref 82–98)
MONOCYTES # BLD AUTO: 0.84 THOUSAND/ΜL (ref 0.17–1.22)
MONOCYTES NFR BLD AUTO: 11 % (ref 4–12)
NEUTROPHILS # BLD AUTO: 2.88 THOUSANDS/ΜL (ref 1.85–7.62)
NEUTS SEG NFR BLD AUTO: 37 % (ref 43–75)
NRBC BLD AUTO-RTO: 0 /100 WBCS
PLATELET # BLD AUTO: 210 THOUSANDS/UL (ref 149–390)
PMV BLD AUTO: 9.8 FL (ref 8.9–12.7)
RBC # BLD AUTO: 4.21 MILLION/UL (ref 3.81–5.12)
WBC # BLD AUTO: 7.9 THOUSAND/UL (ref 4.31–10.16)

## 2024-11-18 PROCEDURE — 82550 ASSAY OF CK (CPK): CPT

## 2024-11-18 PROCEDURE — 99232 SBSQ HOSP IP/OBS MODERATE 35: CPT | Performed by: STUDENT IN AN ORGANIZED HEALTH CARE EDUCATION/TRAINING PROGRAM

## 2024-11-18 PROCEDURE — 85025 COMPLETE CBC W/AUTO DIFF WBC: CPT

## 2024-11-18 PROCEDURE — 86140 C-REACTIVE PROTEIN: CPT

## 2024-11-18 RX ADMIN — TRAZODONE HYDROCHLORIDE 50 MG: 50 TABLET ORAL at 21:13

## 2024-11-18 RX ADMIN — MELATONIN TAB 3 MG 3 MG: 3 TAB at 21:13

## 2024-11-18 RX ADMIN — VALPROIC ACID 1250 MG: 500 SOLUTION ORAL at 21:13

## 2024-11-18 RX ADMIN — CYANOCOBALAMIN TAB 1000 MCG 1000 MCG: 1000 TAB at 08:30

## 2024-11-18 RX ADMIN — FAMOTIDINE 20 MG: 20 TABLET ORAL at 08:30

## 2024-11-18 RX ADMIN — PRAZOSIN HYDROCHLORIDE 5 MG: 5 CAPSULE ORAL at 21:12

## 2024-11-18 RX ADMIN — FAMOTIDINE 20 MG: 20 TABLET ORAL at 17:02

## 2024-11-18 RX ADMIN — POLYETHYLENE GLYCOL 3350 17 G: 17 POWDER, FOR SOLUTION ORAL at 08:30

## 2024-11-18 RX ADMIN — SENNOSIDES AND DOCUSATE SODIUM 2 TABLET: 8.6; 5 TABLET ORAL at 21:12

## 2024-11-18 RX ADMIN — CLOZAPINE 900 MG: 200 TABLET ORAL at 21:12

## 2024-11-18 RX ADMIN — CLONAZEPAM 0.5 MG: 0.5 TABLET ORAL at 08:30

## 2024-11-18 RX ADMIN — CLONAZEPAM 1 MG: 1 TABLET ORAL at 21:12

## 2024-11-18 RX ADMIN — CARVEDILOL 6.25 MG: 6.25 TABLET, FILM COATED ORAL at 08:18

## 2024-11-18 RX ADMIN — ATORVASTATIN CALCIUM 10 MG: 10 TABLET, FILM COATED ORAL at 08:30

## 2024-11-18 RX ADMIN — FLUTICASONE FUROATE 1 PUFF: 100 POWDER RESPIRATORY (INHALATION) at 08:31

## 2024-11-18 RX ADMIN — Medication 15 ML: at 08:30

## 2024-11-18 RX ADMIN — CARVEDILOL 6.25 MG: 6.25 TABLET, FILM COATED ORAL at 15:45

## 2024-11-18 NOTE — NURSING NOTE
Patient is medication and meal compliant.  No complaints of pain or discomfort. Patient is visible on the unit and social with select peers. Patient denies all symptoms of depression or anxiety. Patient is currently awaiting placement at this time. Will continue to monitor patient for changes in mood or behavior.

## 2024-11-18 NOTE — TREATMENT TEAM
11/18/24 0900   Team Meeting   Meeting Type Daily Rounds   Team Members Present   Team Members Present Physician;Nurse;;;Occupational Therapist   Physician Team Member Dr. Banuelos / Dr. Hurst / Dr. Bland / JAIR Ziegler   Nursing Team Member David   Care Management Team Member Manju / Tee   Social Work Team Member Anastacio   OT Team Member Eduardo   Patient/Family Present   Patient Present No   Patient's Family Present No     Pt is reported to deny all signs and symptoms. Pt is reported to be visible on the unit. Pt is reported to have had a good weekend. Discharge pending [facility] placement.

## 2024-11-18 NOTE — PROGRESS NOTES
Progress Note - Behavioral Health   Name: Meli Nowak 59 y.o. female I MRN: [id number]  Unit/Bed#: [facility] 6[facility]-02 I Date of Admission: 7/23/2024   Date of Service: 11/18/2024 I Hospital Day: 118     Assessment & Plan  Schizoaffective disorder, bipolar type (HCC)   Depakote to 1250 mg nightly  Most recent VPA level 54 on 10/12/24  CMP on 10/12/24 reviewed, WNL  Clozaril 900 mg nightly for schizoaffective disorder  Clozaril monitoring:   CRP, CBC/diff, CK QMon for monitoring  ANC 11/18/2024-2.88  VSS  Most recent ECG reviewed 9/9/24 - normal ECG, NSR  Clozaril increased to 900 mg nightly on 10/29/24 - Clozaril level 894 on 11/04/24   Risperdal discontinued l on 10/28/24 for moderate affectivity  Ativan changed to Klonopin 0.5 mg daily/ 1 mg qhs    Continue melatonin 3 mg nightly for insomnia  Continue prazosin 5 mg nightly for PTSD associated nightmares; doses to be adjusted as indicated   Continue trazodone 50 mg nightly for insomnia  Discharge disposition: CRR    ASHLIE (obstructive sleep apnea)  - f/u w/ Sleep Medicine in outpatient setting and consider CPAP as indicated      Plan   Progress Toward Goals:   Meli is progressing towards goals of inpatient psychiatric treatment by continued medication compliance and is attending therapeutic modalities on the milieu. However, the patient continues to require inpatient psychiatric hospitalization for continued medication management and titration to optimize symptom reduction, improve sleep hygiene, and demonstrate adequate self-care.    Recommended Treatment: Treatment plan and medication changes discussed and per the attending physician the plan is:    1.Continue with group therapy, milieu therapy and occupational therapy  2.Behavioral Health checks every 7 minutes  3.Continue frequent safety checks and vitals per unit protocol  4.Continue with SLIM medical management as indicated  5.Continue with current medication regimen  6.Will review labs in the a.m.  7.Disposition  Planning: Discharge planning and efforts remain ongoing    Behavioral Health Medications: all current active meds have been reviewed and continue current psychiatric medications.  Current Facility-Administered Medications   Medication Dose Route Frequency Provider Last Rate    acetaminophen  650 mg Oral Q4H PRN Marie Ziegler, CRNP      acetaminophen  650 mg Oral Q4H PRN Marie Ziegler, CRNP      acetaminophen  975 mg Oral Q6H PRN Marie Ziegler, CRNP      albuterol  2 puff Inhalation Q4H PRN Kristen Logan, CRNP      aluminum-magnesium hydroxide-simethicone  30 mL Oral Q4H PRN Parris Bolanos, CRNP      Artificial Tears  1 drop Both Eyes Q6H PRN Dereje Banuelos MD      atorvastatin  10 mg Oral Daily Marie Ziegler, CRNP      bisacodyl  10 mg Oral Daily PRN Kristen Logan, CRNP      bisacodyl  10 mg Rectal Daily PRN Marie Ziegler, CRNP      carvedilol  6.25 mg Oral BID With Meals Marie Ziegler, CRNP      clonazePAM  0.5 mg Oral Daily Marie Ziegler, CRNP      clonazePAM  1 mg Oral HS Marie Ziegler, CRNP      cloZAPine  900 mg Oral HS Marie Ziegler, CRNP      cyanocobalamin  1,000 mcg Oral Daily Marie Ziegler, CRNP      Diclofenac Sodium  2 g Topical 4x Daily PRN Marie Ziegler, CRNP      famotidine  20 mg Oral BID Shan Fitzgerald DO      fluticasone  1 puff Inhalation Daily Marie Ziegler, CRNP      hydrOXYzine HCL  25 mg Oral Q6H PRN Max 4/day Marie Ziegler, CRNP      hydrOXYzine HCL  50 mg Oral Q6H PRN Max 4/day Marie Ziegler, CRNP      LORazepam  1 mg Intramuscular Q6H PRN Max 3/day Marie Ziegler, CRNP      LORazepam  1 mg Oral Q6H PRN Max 3/day Marie Ziegler, CRNP      melatonin  3 mg Oral HS Marie Ziegler, CRNP      mirtazapine  7.5 mg Oral HS PRN Marie Ziegler, CRNP      Multivitamin  15 mL Oral Daily Marie Ziegler, CRNP      OLANZapine  2.5 mg Oral Q6H PRN Dereje Banuelos MD      Or    OLANZapine  2.5 mg Intramuscular Q6H PRN Dereje Banuelos MD      OLANZapine   "5 mg Oral Q6H PRN Dereje Banuelos MD      Or    OLANZapine  5 mg Intramuscular Q6H PRN Dereje Banuelos MD      ondansetron  4 mg Oral Q6H PRN Darin Lawton MD      polyethylene glycol  17 g Oral Daily Kristenabdoulaye Ludwig Doreen, JOSE ELIAS      polyethylene glycol  17 g Oral Daily PRN JOSE ELIAS Figueroa      prazosin  5 mg Oral HS JOSE ELIAS Figueroa      senna-docusate sodium  2 tablet Oral HS Rehana Borrego PA-C      traZODone  50 mg Oral HS JOSE ELIAS Figueroa      valproic acid  1,250 mg Oral HS JOSE ELIAS Figueroa         Risks / Benefits of Treatment:  Risks, benefits, and possible side effects of medications explained to patient and patient verbalizes understanding and agreement for treatment.       Subjective    Patient was seen today for continuation of care, records reviewed and patient was discussed with the morning case review team.    Meli was seen today for psychiatric follow-up.  On assessment today, Meli was brighter on approach. Weekend reported as uneventful, she was able to show this writer several pictures she was able to draw. Questioned about anxiety, Meli reports it is \"OK\" and feels that Klonopin is working. Advised to request prn Atarax for any breakthrough symptoms she was agreeable. AH, paranoia and Taoist/somatic preoccupation chronic and fixed, she is more redirectable and reports decrease in AH. Qmonday labs reviewed, ANC 2.88. Sleep improving and appetite remains well. Meli denies acute suicidal/self-harm ideation/intent/plan upon direct inquiry at this time.  Meli remains behaviorally appropriate, no agitation or aggression noted on exam or in report. Impulse control is intact.  Meli remains adherent to her current psychotropic medication regimen and denies any side effects from medications, as well as none noted on exam.    Meli is currently assessed as being at their baseline with continued need for medication management, supervision for safety and ADL’s. These services " are not currently available in a less restrictive environment necessitating continued hospital stay.  Meli should remain on the unit until these services are available, due to likelihood of mental decompensation and readmission if discharged to an unsupervised setting.  Assertive discharge planning and collaboration with multiple providers (inpatient and community based) remains ongoing.  Meli is currently awaiting for CRR.     Psychiatric Review of Systems:  Behavior over the last 24 hours:  unchanged.   Sleep: good  Appetite: good  Medication side effects: none  ROS: no complaints, denies shortness of breath or chest pain and all other systems are negative for acute changes        Objective    Vitals:  Vitals:    11/18/24 0718   BP: 142/96   Pulse: 85   Resp: 18   Temp: 97.7 °F (36.5 °C)   SpO2: 100%       Laboratory Results:  I have personally reviewed all pertinent laboratory/tests results.  Most Recent Labs:   Lab Results   Component Value Date    WBC 7.90 11/18/2024    RBC 4.21 11/18/2024    HGB 13.1 11/18/2024    HCT 41.9 11/18/2024     11/18/2024    RDW 13.3 11/18/2024    NEUTROABS 2.88 11/18/2024    SODIUM 138 10/12/2024    K 4.1 10/12/2024     10/12/2024    CO2 29 10/12/2024    BUN 18 10/12/2024    CREATININE 0.97 10/12/2024    GLUC 146 (H) 10/12/2024    GLUF 98 08/10/2024    CALCIUM 9.2 10/12/2024    AST 12 (L) 10/12/2024    ALT 12 10/12/2024    ALKPHOS 84 10/12/2024    TP 6.7 10/12/2024    ALB 3.9 10/12/2024    TBILI 0.26 10/12/2024    VALPROICTOT 54 10/12/2024    AMMONIA 32 07/03/2024    [id number] 2.000 07/24/2024    HGBA1C 6.4 (H) 05/03/2024     05/03/2024       Mental Status Evaluation:  Appearance:  disheveled, marginal hygiene   Behavior:  cooperative, calm, guarded   Speech:  normal rate and volume   Mood:  less anxious, less depressed   Affect:  constricted   Thought Process:  perseverative, concrete   Thought Content:  Latter-day and somatic preoccupation, paranoid ideation    Perceptual Disturbances: auditory hallucinations still present, but less frequent   Risk Potential: Suicidal ideation - None  Homicidal ideation - None  Potential for aggression - No   Memory:  recent and remote memory grossly intact   Sensorium  person, place, and time/date      Consciousness:  alert and awake   Attention: attention span and concentration are age appropriate   Insight:  limited   Judgment: limited   Gait/Station: normal gait/station   Motor Activity: no abnormal movements       Counseling / Coordination of Care:  Patient's progress reviewed with nursing staff.  Medications, treatment progress and treatment plan reviewed with patient.  Supportive counseling provided to the patient.    This note was completed in part utilizing Dragon dictation Software. Grammatical, translation, syntax errors, random word insertions, spelling mistakes, and incomplete sentences may be an occasional consequence of this system secondary to software limitations with voice recognition, ambient noise, and hardware issues. If you have any questions or concerns about the content, text, or information contained within the body of this dictation, please contact the provider for clarification

## 2024-11-18 NOTE — PLAN OF CARE
Problem: Alteration in Thoughts and Perception  Goal: Treatment Goal: Gain control of psychotic behaviors/thinking, reduce/eliminate presenting symptoms and demonstrate improved reality functioning upon discharge  Outcome: Progressing  Goal: Verbalize thoughts and feelings  Description: Interventions:  - Promote a nonjudgmental and trusting relationship with the patient through active listening and therapeutic communication  - Assess patient's level of functioning, behavior and potential for risk  - Engage patient in 1 on 1 interactions  - Encourage patient to express fears, feelings, frustrations, and discuss symptoms    - [place] patient to reality, help patient recognize reality-based thinking   - Administer medications as ordered and assess for potential side effects  - Provide the patient education related to the signs and symptoms of the illness and desired effects of prescribed medications  Outcome: Progressing  Goal: Refrain from acting on delusional thinking/internal stimuli  Description: Interventions:  - Monitor patient closely, per order   - Utilize least restrictive measures   - Set reasonable limits, give positive feedback for acceptable   - Administer medications as ordered and monitor of potential side effects  Outcome: Progressing  Goal: Agree to be compliant with medication regime, as prescribed and report medication side effects  Description: Interventions:  - Offer appropriate PRN medication and supervise ingestion; conduct AIMS, as needed   Outcome: Progressing  Goal: Attend and participate in unit activities, including therapeutic, recreational, and educational groups  Description: Interventions:  -Encourage Visitation and family involvement in care  Outcome: Progressing  Goal: Recognize dysfunctional thoughts, communicate reality-based thoughts at the time of discharge  Description: Interventions:  - Provide medication and psycho-education to assist patient in compliance and developing  insight into his/her illness   Outcome: Progressing  Goal: Complete daily ADLs, including personal hygiene independently, as able  Description: Interventions:  - Observe, teach, and assist patient with ADLS  - Monitor and promote a balance of rest/activity, with adequate nutrition and elimination   Outcome: Progressing     Problem: Depression  Goal: Treatment Goal: Demonstrate behavioral control of depressive symptoms, verbalize feelings of improved mood/affect, and adopt new coping skills prior to discharge  Outcome: Progressing  Goal: Verbalize thoughts and feelings  Description: Interventions:  - Assess and re-assess patient's level of risk   - Engage patient in 1:1 interactions, daily, for a minimum of 15 minutes   - Encourage patient to express feelings, fears, frustrations, hopes   Outcome: Progressing  Goal: Refrain from isolation  Description: Interventions:  - Develop a trusting relationship   - Encourage socialization   Outcome: Progressing  Goal: Refrain from self-neglect  Outcome: Progressing  Goal: Attend and participate in unit activities, including therapeutic, recreational, and educational groups  Description: Interventions:  - Provide therapeutic and educational activities daily, encourage attendance and participation, and document same in the medical record   Outcome: Progressing     Problem: Potential for Falls  Goal: Patient will remain free of falls  Description: INTERVENTIONS:  - Educate patient on patient safety including physical limitations  - Instruct patient to call for assistance with activity   - Consult OT/PT to assist with strengthening/mobility   - Keep Call bell within reach  - Keep bed low and locked with side rails adjusted as appropriate  - Keep care items and personal belongings within reach  - Initiate and maintain comfort rounds  - Offer Toileting every 2 Hours, in advance of need  - Initiate/Maintain bed and chair alarm  - Obtain necessary fall risk management equipment:  walker, wheelchair  - Apply yellow socks and bracelet for high fall risk patients  - Patient moved to room near nurses station  Outcome: Progressing

## 2024-11-18 NOTE — ASSESSMENT & PLAN NOTE
Depakote to 1250 mg nightly  Most recent VPA level 54 on 10/12/24  CMP on 10/12/24 reviewed, WNL  Clozaril 900 mg nightly for schizoaffective disorder  Clozaril monitoring:   CRP, CBC/diff, CK QMon for monitoring  ANC 11/18/2024-2.88  VSS  Most recent ECG reviewed 9/9/24 - normal ECG, NSR  Clozaril increased to 900 mg nightly on 10/29/24 - Clozaril level 894 on 11/04/24   Risperdal discontinued l on 10/28/24 for moderate affectivity  Ativan changed to Klonopin 0.5 mg daily/ 1 mg qhs    Continue melatonin 3 mg nightly for insomnia  Continue prazosin 5 mg nightly for PTSD associated nightmares; doses to be adjusted as indicated   Continue trazodone 50 mg nightly for insomnia  Discharge disposition: CRR

## 2024-11-19 PROCEDURE — 99232 SBSQ HOSP IP/OBS MODERATE 35: CPT | Performed by: STUDENT IN AN ORGANIZED HEALTH CARE EDUCATION/TRAINING PROGRAM

## 2024-11-19 RX ADMIN — PRAZOSIN HYDROCHLORIDE 5 MG: 5 CAPSULE ORAL at 22:10

## 2024-11-19 RX ADMIN — SENNOSIDES AND DOCUSATE SODIUM 2 TABLET: 8.6; 5 TABLET ORAL at 22:10

## 2024-11-19 RX ADMIN — CLONAZEPAM 1 MG: 1 TABLET ORAL at 22:11

## 2024-11-19 RX ADMIN — POLYETHYLENE GLYCOL 3350 17 G: 17 POWDER, FOR SOLUTION ORAL at 08:51

## 2024-11-19 RX ADMIN — FAMOTIDINE 20 MG: 20 TABLET ORAL at 08:52

## 2024-11-19 RX ADMIN — ATORVASTATIN CALCIUM 10 MG: 10 TABLET, FILM COATED ORAL at 08:51

## 2024-11-19 RX ADMIN — FLUTICASONE FUROATE 1 PUFF: 100 POWDER RESPIRATORY (INHALATION) at 08:56

## 2024-11-19 RX ADMIN — Medication 15 ML: at 08:51

## 2024-11-19 RX ADMIN — CLONAZEPAM 0.5 MG: 0.5 TABLET ORAL at 08:51

## 2024-11-19 RX ADMIN — ACETAMINOPHEN 975 MG: 325 TABLET, FILM COATED ORAL at 10:35

## 2024-11-19 RX ADMIN — CYANOCOBALAMIN TAB 1000 MCG 1000 MCG: 1000 TAB at 08:52

## 2024-11-19 RX ADMIN — FAMOTIDINE 20 MG: 20 TABLET ORAL at 17:26

## 2024-11-19 RX ADMIN — ALBUTEROL SULFATE 2 PUFF: 90 AEROSOL, METERED RESPIRATORY (INHALATION) at 22:15

## 2024-11-19 RX ADMIN — CARVEDILOL 6.25 MG: 6.25 TABLET, FILM COATED ORAL at 08:56

## 2024-11-19 RX ADMIN — CLOZAPINE 900 MG: 200 TABLET ORAL at 22:10

## 2024-11-19 RX ADMIN — MELATONIN TAB 3 MG 3 MG: 3 TAB at 22:11

## 2024-11-19 RX ADMIN — TRAZODONE HYDROCHLORIDE 50 MG: 50 TABLET ORAL at 22:11

## 2024-11-19 RX ADMIN — VALPROIC ACID 1250 MG: 500 SOLUTION ORAL at 22:08

## 2024-11-19 NOTE — TREATMENT TEAM
Pt attends groups.  Pt pleasant and cooperative  Pt  displayed positive thinking pattens.      11/19/24 0900 11/19/24 1000 11/19/24 1300   Activity/Group Checklist   Group Exercise Other (Comment)  (Group Art Therapy/Psychodynamic, Open Choice followed by Process Discussion) Self Esteem   Attendance Did not attend  (Sleeping) Did not attend Attended   Attendance Duration (min)  --   --  31-45   Interactions  --   --  Interacted appropriately   Affect/Mood  --   --  Calm   Goals Achieved  --   --  Identified feelings;Identified triggers;Discussed coping strategies;Able to listen to others;Able to engage in interactions      11/19/24 1345   Activity/Group Checklist   Group Other (Comment)  (Mindfulness: Letting go)   Attendance Attended   Attendance Duration (min) 16-30   Interactions Interacted appropriately   Affect/Mood Appropriate   Goals Achieved Identified feelings;Able to listen to others;Able to engage in interactions

## 2024-11-19 NOTE — PROGRESS NOTES
Progress Note - Behavioral Health   Name: Meli Nowak 59 y.o. female I MRN: [id number]  Unit/Bed#: [facility] 651-02 I Date of Admission: 7/23/2024   Date of Service: 11/19/2024 I Hospital Day: 119     Assessment & Plan  Schizoaffective disorder, bipolar type (HCC)   Depakote to 1250 mg nightly  Most recent VPA level 54 on 10/12/24  CMP on 10/12/24 reviewed, WNL  Clozaril 900 mg nightly for schizoaffective disorder  Clozaril monitoring:   CRP, CBC/diff, CK QMon for monitoring  ANC 11/18/2024-2.88  Vital Signs stable   Most recent ECG reviewed 9/9/24 - normal ECG, NSR  Clozaril increased to 900 mg nightly on 10/29/24 - Clozaril level 894 on 11/04/24   Risperdal discontinued l on 10/28/24 for moderate affectivity  Ativan changed to Klonopin 0.5 mg daily/ 1 mg qhs    Continue melatonin 3 mg nightly for insomnia  Continue prazosin 5 mg nightly for PTSD associated nightmares; doses to be adjusted as indicated   Continue trazodone 50 mg nightly for insomnia  Discharge disposition: LTSR    ASHLIE (obstructive sleep apnea)  - f/u w/ Sleep Medicine in outpatient setting and consider CPAP as indicated      Plan   Progress Toward Goals:   Meli is progressing towards goals of inpatient psychiatric treatment by continued medication compliance and is attending therapeutic modalities on the milieu. However, the patient continues to require inpatient psychiatric hospitalization for continued medication management and titration to optimize symptom reduction, improve sleep hygiene, and demonstrate adequate self-care.    Recommended Treatment: Treatment plan and medication changes discussed and per the attending physician the plan is:    1.Continue with group therapy, milieu therapy and occupational therapy  2.Behavioral Health checks every 7 minutes  3.Continue frequent safety checks and vitals per unit protocol  4.Continue with SLIM medical management as indicated  5.Continue with current medication regimen  6.Will review labs in the  a.m.  7.Disposition Planning: Discharge planning and efforts remain ongoing    Behavioral Health Medications: all current active meds have been reviewed and continue current psychiatric medications.  Current Facility-Administered Medications   Medication Dose Route Frequency Provider Last Rate    acetaminophen  650 mg Oral Q4H PRN Marie Ziegler, CRNP      acetaminophen  650 mg Oral Q4H PRN Marie Ziegler, CRNP      acetaminophen  975 mg Oral Q6H PRN Marie Ziegler, CRNP      albuterol  2 puff Inhalation Q4H PRN Kristen Logan, CRNP      aluminum-magnesium hydroxide-simethicone  30 mL Oral Q4H PRN Parris Bolanos, CRNP      Artificial Tears  1 drop Both Eyes Q6H PRN Dereje Banuelos MD      atorvastatin  10 mg Oral Daily Marie Ziegler, CRNP      bisacodyl  10 mg Oral Daily PRN Kristen Logan, CRNP      bisacodyl  10 mg Rectal Daily PRN Marie Ziegler, CRNP      carvedilol  6.25 mg Oral BID With Meals Marie Ziegler, CRNP      clonazePAM  0.5 mg Oral Daily Marie Ziegler, CRNP      clonazePAM  1 mg Oral HS Marie Ziegler, CRNP      cloZAPine  900 mg Oral HS Marie Ziegler, CRNP      cyanocobalamin  1,000 mcg Oral Daily Marie Ziegler, CRNP      Diclofenac Sodium  2 g Topical 4x Daily PRN Marie Ziegler, CRNP      famotidine  20 mg Oral BID Shan Fitzgerald, DO      fluticasone  1 puff Inhalation Daily Marie Ziegler, CRNP      hydrOXYzine HCL  25 mg Oral Q6H PRN Max 4/day Marie Ziegler, CRNP      hydrOXYzine HCL  50 mg Oral Q6H PRN Max 4/day Marie Ziegler, CRNP      LORazepam  1 mg Intramuscular Q6H PRN Max 3/day Marie Ziegler, CRNP      LORazepam  1 mg Oral Q6H PRN Max 3/day Marie Ziegler, CRNP      melatonin  3 mg Oral HS Marie Ziegler, CRNP      mirtazapine  7.5 mg Oral HS PRN Marie Ziegler, CRNP      Multivitamin  15 mL Oral Daily Marie Ziegler, CRNP      OLANZapine  2.5 mg Oral Q6H PRN Dereje Banuelos MD      Or    OLANZapine  2.5 mg Intramuscular Q6H PRN Dereje Banuelos,  MD      OLANZapine  5 mg Oral Q6H PRN Dereje Banuelos MD      Or    OLANZapine  5 mg Intramuscular Q6H PRN Dereje Banuelos MD      ondansetron  4 mg Oral Q6H PRN Darin Lawton MD      polyethylene glycol  17 g Oral Daily Kristenabdoulaye Tomasjimmy Logan, JOSE ELIAS      polyethylene glycol  17 g Oral Daily PRN JOSE ELIAS Figueroa      prazosin  5 mg Oral HS JOSE ELIAS Figueroa      senna-docusate sodium  2 tablet Oral HS Rehana Borrego PA-C      traZODone  50 mg Oral HS JOSE ELIAS Figueroa      valproic acid  1,250 mg Oral HS JOSE ELIAS Figueroa         Risks / Benefits of Treatment:  Risks, benefits, and possible side effects of medications explained to patient and patient verbalizes understanding and agreement for treatment.       Subjective    Patient was seen today for continuation of care, records reviewed and patient was discussed with the morning case review team.Meli was seen today for psychiatric follow-up.  On assessment today, Meli was seen lying in her room. More somatic today, she reports that she has generalized body aches and requested prn tylenol which was effective. AH chronic and fixed, patient using distraction and coping skills to manage symptoms. No current medication changes, Meli tolerating Clozaril well and does not report any adverse effects including constipation as LBM 11/18/24. Sleep and appetite well.   Meli denies acute suicidal/self-harm ideation/intent/plan upon direct inquiry at this time.  Meli remains behaviorally appropriate, no agitation or aggression noted on exam or in report.  Meli also denies HI/AH/VH, and does not appear overtly manic.  No overt delusions or paranoia are verbalized. Impulse control is intact.  Meli remains adherent to her current psychotropic medication regimen and denies any side effects from medications, as well as none noted on exam.    Meli is currently assessed as being at their baseline with continued need for medication management, supervision for  safety and ADL’s. These services are not currently available in a less restrictive environment necessitating continued hospital stay.  Meli should remain on the unit until these services are available, due to likelihood of mental decompensation and readmission if discharged to an unsupervised setting.  Assertive discharge planning and collaboration with multiple providers (inpatient and community based) remains ongoing.  Meli is currently awaiting for LTSR.     Psychiatric Review of Systems:  Behavior over the last 24 hours:  unchanged.   Sleep:   Appetite:   Medication side effects:   ROS: no complaints, denies shortness of breath or chest pain and all other systems are negative for acute changes        Objective    Vitals:  Vitals:    11/19/24 0854   BP: 124/57   Pulse: 79   Resp: 18   Temp: 97.7 °F (36.5 °C)   SpO2: 93%       Laboratory Results:  I have personally reviewed all pertinent laboratory/tests results.  Most Recent Labs:   Lab Results   Component Value Date    WBC 7.90 11/18/2024    RBC 4.21 11/18/2024    HGB 13.1 11/18/2024    HCT 41.9 11/18/2024     11/18/2024    RDW 13.3 11/18/2024    NEUTROABS 2.88 11/18/2024    SODIUM 138 10/12/2024    K 4.1 10/12/2024     10/12/2024    CO2 29 10/12/2024    BUN 18 10/12/2024    CREATININE 0.97 10/12/2024    GLUC 146 (H) 10/12/2024    GLUF 98 08/10/2024    CALCIUM 9.2 10/12/2024    AST 12 (L) 10/12/2024    ALT 12 10/12/2024    ALKPHOS 84 10/12/2024    TP 6.7 10/12/2024    ALB 3.9 10/12/2024    TBILI 0.26 10/12/2024    VALPROICTOT 54 10/12/2024    AMMONIA 32 07/03/2024    [id number] 2.000 07/24/2024    HGBA1C 6.4 (H) 05/03/2024     05/03/2024       Mental Status Evaluation:  Appearance:  disheveled, marginal hygiene   Behavior:  cooperative, calm   Speech:  normal rate and volume   Mood:  less anxious, less depressed   Affect:  constricted   Thought Process:  perseverative, concrete   Thought Content:  Restorationism and somatic preoccupation,  paranoid ideation   Perceptual Disturbances: auditory hallucinations still present, but less frequent   Risk Potential: Suicidal ideation - None  Homicidal ideation - None  Potential for aggression - No   Memory:  recent and remote memory grossly intact   Sensorium  person, place, and time/date      Consciousness:  alert and awake   Attention: attention span and concentration are age appropriate   Insight:  limited   Judgment: limited   Gait/Station: normal gait/station   Motor Activity: no abnormal movements       Counseling / Coordination of Care:  Patient's progress reviewed with nursing staff.  Medications, treatment progress and treatment plan reviewed with patient.  Supportive counseling provided to the patient.    This note was completed in part utilizing Dragon dictation Software. Grammatical, translation, syntax errors, random word insertions, spelling mistakes, and incomplete sentences may be an occasional consequence of this system secondary to software limitations with voice recognition, ambient noise, and hardware issues. If you have any questions or concerns about the content, text, or information contained within the body of this dictation, please contact the provider for clarification

## 2024-11-19 NOTE — TREATMENT TEAM
11/19/24 0900   Team Meeting   Meeting Type Daily Rounds   Team Members Present   Team Members Present Physician;Nurse;;;Occupational Therapist   Physician Team Member Dr. Banuelos / Dr. Hurst / Dr. Bland / JAIR Ziegler   Nursing Team Member David   Care Management Team Member Manju / Tee   Social Work Team Member Anastacio   OT Team Member Eduardo   Patient/Family Present   Patient Present No   Patient's Family Present No     Pt is reported to be visible on the unit and attending groups. Pt is reported to be social with peers. Pt is awaiting LTSR.

## 2024-11-19 NOTE — ASSESSMENT & PLAN NOTE
Depakote to 1250 mg nightly  Most recent VPA level 54 on 10/12/24  CMP on 10/12/24 reviewed, WNL  Clozaril 900 mg nightly for schizoaffective disorder  Clozaril monitoring:   CRP, CBC/diff, CK QMon for monitoring  ANC 11/18/2024-2.88  Vital Signs stable   Most recent ECG reviewed 9/9/24 - normal ECG, NSR  Clozaril increased to 900 mg nightly on 10/29/24 - Clozaril level 894 on 11/04/24   Risperdal discontinued l on 10/28/24 for moderate affectivity  Ativan changed to Klonopin 0.5 mg daily/ 1 mg qhs    Continue melatonin 3 mg nightly for insomnia  Continue prazosin 5 mg nightly for PTSD associated nightmares; doses to be adjusted as indicated   Continue trazodone 50 mg nightly for insomnia  Discharge disposition: LTSR

## 2024-11-19 NOTE — PLAN OF CARE
Problem: Alteration in Thoughts and Perception  Goal: Treatment Goal: Gain control of psychotic behaviors/thinking, reduce/eliminate presenting symptoms and demonstrate improved reality functioning upon discharge  Outcome: Progressing  Goal: Verbalize thoughts and feelings  Description: Interventions:  - Promote a nonjudgmental and trusting relationship with the patient through active listening and therapeutic communication  - Assess patient's level of functioning, behavior and potential for risk  - Engage patient in 1 on 1 interactions  - Encourage patient to express fears, feelings, frustrations, and discuss symptoms    - [place] patient to reality, help patient recognize reality-based thinking   - Administer medications as ordered and assess for potential side effects  - Provide the patient education related to the signs and symptoms of the illness and desired effects of prescribed medications  Outcome: Progressing  Goal: Refrain from acting on delusional thinking/internal stimuli  Description: Interventions:  - Monitor patient closely, per order   - Utilize least restrictive measures   - Set reasonable limits, give positive feedback for acceptable   - Administer medications as ordered and monitor of potential side effects  Outcome: Progressing  Goal: Agree to be compliant with medication regime, as prescribed and report medication side effects  Description: Interventions:  - Offer appropriate PRN medication and supervise ingestion; conduct AIMS, as needed   Outcome: Progressing  Goal: Attend and participate in unit activities, including therapeutic, recreational, and educational groups  Description: Interventions:  -Encourage Visitation and family involvement in care  Outcome: Progressing  Goal: Recognize dysfunctional thoughts, communicate reality-based thoughts at the time of discharge  Description: Interventions:  - Provide medication and psycho-education to assist patient in compliance and developing  insight into his/her illness   Outcome: Progressing  Goal: Complete daily ADLs, including personal hygiene independently, as able  Description: Interventions:  - Observe, teach, and assist patient with ADLS  - Monitor and promote a balance of rest/activity, with adequate nutrition and elimination   Outcome: Progressing     Problem: Depression  Goal: Treatment Goal: Demonstrate behavioral control of depressive symptoms, verbalize feelings of improved mood/affect, and adopt new coping skills prior to discharge  Outcome: Progressing  Goal: Verbalize thoughts and feelings  Description: Interventions:  - Assess and re-assess patient's level of risk   - Engage patient in 1:1 interactions, daily, for a minimum of 15 minutes   - Encourage patient to express feelings, fears, frustrations, hopes   Outcome: Progressing  Goal: Refrain from isolation  Description: Interventions:  - Develop a trusting relationship   - Encourage socialization   Outcome: Progressing  Goal: Refrain from self-neglect  Outcome: Progressing  Goal: Attend and participate in unit activities, including therapeutic, recreational, and educational groups  Description: Interventions:  - Provide therapeutic and educational activities daily, encourage attendance and participation, and document same in the medical record   Outcome: Progressing     Problem: Anxiety  Goal: Anxiety is at manageable level  Description: Interventions:  - Assess and monitor patient's anxiety level.   - Monitor for signs and symptoms (heart palpitations, chest pain, shortness of breath, headaches, nausea, feeling jumpy, restlessness, irritable, apprehensive).   - Collaborate with interdisciplinary team and initiate plan and interventions as ordered.  - [place] patient to unit/surroundings  - Explain treatment plan  - Encourage participation in care  - Encourage verbalization of concerns/fears  - Identify coping mechanisms  - Assist in developing anxiety-reducing skills  -  Administer/offer alternative therapies  - Limit or eliminate stimulants  Outcome: Progressing     Problem: Potential for Falls  Goal: Patient will remain free of falls  Description: INTERVENTIONS:  - Educate patient on patient safety including physical limitations  - Instruct patient to call for assistance with activity   - Consult OT/PT to assist with strengthening/mobility   - Keep Call bell within reach  - Keep bed low and locked with side rails adjusted as appropriate  - Keep care items and personal belongings within reach  - Initiate and maintain comfort rounds  - Offer Toileting every 2 Hours, in advance of need  - Initiate/Maintain bed and chair alarm  - Obtain necessary fall risk management equipment: walker, wheelchair  - Apply yellow socks and bracelet for high fall risk patients  - Patient moved to room near nurses station  Outcome: Progressing

## 2024-11-20 PROCEDURE — 99232 SBSQ HOSP IP/OBS MODERATE 35: CPT | Performed by: STUDENT IN AN ORGANIZED HEALTH CARE EDUCATION/TRAINING PROGRAM

## 2024-11-20 RX ORDER — VALPROIC ACID 250 MG/5ML
1000 SOLUTION ORAL
Status: DISCONTINUED | OUTPATIENT
Start: 2024-11-20 | End: 2024-11-25

## 2024-11-20 RX ADMIN — CARVEDILOL 6.25 MG: 6.25 TABLET, FILM COATED ORAL at 08:53

## 2024-11-20 RX ADMIN — FAMOTIDINE 20 MG: 20 TABLET ORAL at 08:53

## 2024-11-20 RX ADMIN — MELATONIN TAB 3 MG 3 MG: 3 TAB at 21:08

## 2024-11-20 RX ADMIN — ACETAMINOPHEN 975 MG: 325 TABLET, FILM COATED ORAL at 10:33

## 2024-11-20 RX ADMIN — VALPROIC ACID 1000 MG: 500 SOLUTION ORAL at 21:08

## 2024-11-20 RX ADMIN — CLONAZEPAM 0.5 MG: 0.5 TABLET ORAL at 08:53

## 2024-11-20 RX ADMIN — CYANOCOBALAMIN TAB 1000 MCG 1000 MCG: 1000 TAB at 08:54

## 2024-11-20 RX ADMIN — FLUTICASONE FUROATE 1 PUFF: 100 POWDER RESPIRATORY (INHALATION) at 08:53

## 2024-11-20 RX ADMIN — ATORVASTATIN CALCIUM 10 MG: 10 TABLET, FILM COATED ORAL at 08:53

## 2024-11-20 RX ADMIN — POLYETHYLENE GLYCOL 3350 17 G: 17 POWDER, FOR SOLUTION ORAL at 08:52

## 2024-11-20 RX ADMIN — CLOZAPINE 900 MG: 200 TABLET ORAL at 21:07

## 2024-11-20 RX ADMIN — FAMOTIDINE 20 MG: 20 TABLET ORAL at 17:06

## 2024-11-20 RX ADMIN — PRAZOSIN HYDROCHLORIDE 5 MG: 5 CAPSULE ORAL at 21:08

## 2024-11-20 RX ADMIN — SENNOSIDES AND DOCUSATE SODIUM 2 TABLET: 8.6; 5 TABLET ORAL at 21:08

## 2024-11-20 RX ADMIN — Medication 15 ML: at 08:52

## 2024-11-20 RX ADMIN — TRAZODONE HYDROCHLORIDE 50 MG: 50 TABLET ORAL at 21:08

## 2024-11-20 RX ADMIN — CLONAZEPAM 1 MG: 1 TABLET ORAL at 21:08

## 2024-11-20 RX ADMIN — CARVEDILOL 6.25 MG: 6.25 TABLET, FILM COATED ORAL at 17:06

## 2024-11-20 NOTE — PROGRESS NOTES
Pt attended 1 afternoon group.  Pt less anxious and displayed empathy and concern for peer who needed medical intervention/assistance.      11/20/24 1330   Activity/Group Checklist   Group Other (Comment)  (Boudaries and commnication)   Attendance Attended   Attendance Duration (min) 46-60   Interactions Interacted appropriately   Affect/Mood Calm   Goals Achieved Identified feelings;Identified triggers;Discussed coping strategies;Identified distorted thoughts/beliefs;Displayed empathy;Able to listen to others;Able to engage in interactions

## 2024-11-20 NOTE — PLAN OF CARE
Problem: Prexisting or High Potential for Compromised Skin Integrity  Goal: Skin integrity is maintained or improved  Description: INTERVENTIONS:  - Identify patients at risk for skin breakdown  - Assess and monitor skin integrity  - Assess and monitor nutrition and hydration status  - Monitor labs   - Assess for incontinence   - Turn and reposition patient  - Assist with mobility/ambulation  - Relieve pressure over bony prominences  - Avoid friction and shearing  - Provide appropriate hygiene as needed including keeping skin clean and dry  - Evaluate need for skin moisturizer/barrier cream  - Collaborate with interdisciplinary team   - Patient/family teaching  - Consider wound care consult   Outcome: Progressing     Problem: Alteration in Thoughts and Perception  Goal: Treatment Goal: Gain control of psychotic behaviors/thinking, reduce/eliminate presenting symptoms and demonstrate improved reality functioning upon discharge  Outcome: Progressing  Goal: Verbalize thoughts and feelings  Description: Interventions:  - Promote a nonjudgmental and trusting relationship with the patient through active listening and therapeutic communication  - Assess patient's level of functioning, behavior and potential for risk  - Engage patient in 1 on 1 interactions  - Encourage patient to express fears, feelings, frustrations, and discuss symptoms    - [place] patient to reality, help patient recognize reality-based thinking   - Administer medications as ordered and assess for potential side effects  - Provide the patient education related to the signs and symptoms of the illness and desired effects of prescribed medications  Outcome: Progressing  Goal: Agree to be compliant with medication regime, as prescribed and report medication side effects  Description: Interventions:  - Offer appropriate PRN medication and supervise ingestion; conduct AIMS, as needed   Outcome: Progressing     Problem: Ineffective Coping  Goal:  Participates in unit activities  Description: Interventions:  - Provide therapeutic environment   - Provide required programming   - Redirect inappropriate behaviors   Outcome: Progressing  Goal: Understands least restrictive measures  Description: Interventions:  - Utilize least restrictive behavior  Outcome: Progressing  Goal: Free from restraint events  Description: - Utilize least restrictive measures   - Provide behavioral interventions   - Redirect inappropriate behaviors   Outcome: Progressing     Problem: Risk for Self Injury/Neglect  Goal: Refrain from harming self  Description: Interventions:  - Monitor patient closely, per order  - Develop a trusting relationship  - Supervise medication ingestion, monitor effects and side effects   Outcome: Progressing     Problem: Depression  Goal: Treatment Goal: Demonstrate behavioral control of depressive symptoms, verbalize feelings of improved mood/affect, and adopt new coping skills prior to discharge  Outcome: Progressing     Problem: Potential for Falls  Goal: Patient will remain free of falls  Description: INTERVENTIONS:  - Educate patient on patient safety including physical limitations  - Instruct patient to call for assistance with activity   - Consult OT/PT to assist with strengthening/mobility   - Keep Call bell within reach  - Keep bed low and locked with side rails adjusted as appropriate  - Keep care items and personal belongings within reach  - Initiate and maintain comfort rounds  - Offer Toileting every 2 Hours, in advance of need  - Initiate/Maintain bed and chair alarm  - Obtain necessary fall risk management equipment: walker, wheelchair  - Apply yellow socks and bracelet for high fall risk patients  - Patient moved to room near nurses station  Outcome: Progressing     Problem: Nutrition/Hydration-ADULT  Goal: Nutrient/Hydration intake appropriate for improving, restoring or maintaining nutritional needs  Description: Monitor and assess patient's  nutrition/hydration status for malnutrition. Collaborate with interdisciplinary team and initiate plan and interventions as ordered.  Monitor patient's weight and dietary intake as ordered or per policy. Utilize nutrition screening tool and intervene as necessary. Determine patient's food preferences and provide high-protein, high-caloric foods as appropriate.     INTERVENTIONS:  - Monitor oral intake, urinary output, labs, and treatment plans  - Assess nutrition and hydration status and recommend course of action  - Evaluate amount of meals eaten  - Assist patient with eating if necessary   - Allow adequate time for meals  - Recommend/ encourage appropriate diets, oral nutritional supplements, and vitamin/mineral supplements  - Order, calculate, and assess calorie counts as needed  - Recommend, monitor, and adjust tube feedings and TPN/PPN based on assessed needs  - Assess need for intravenous fluids  - Provide specific nutrition/hydration education as appropriate  - Include patient/family/caregiver in decisions related to nutrition  Outcome: Progressing

## 2024-11-20 NOTE — NURSING NOTE
Patient interactive with staff but isolates to room. Patient denies all s/s of psych. [facility] is medication compliant and cooperative. Meli refused bed alarm- removes the pad from bed. Will continue to monitor.

## 2024-11-20 NOTE — ASSESSMENT & PLAN NOTE
Decrease Depakote to 1000 mg nightly for daytime sedation   Most recent VPA level 54 on 10/12/24  CMP on 10/12/24 reviewed, WNL  Clozaril 900 mg nightly for schizoaffective disorder  Clozaril monitoring:   CRP, CBC/diff, CK QMon for monitoring  ANC 11/18/2024-2.88  Vital Signs stable   Most recent ECG reviewed 9/9/24 - normal ECG, NSR  Clozaril increased to 900 mg nightly on 10/29/24 - Clozaril level 894 on 11/04/24   Risperdal discontinued l on 10/28/24 for moderate affectivity  Ativan changed to Klonopin 0.5 mg daily/ 1 mg qhs    Continue melatonin 3 mg nightly for insomnia  Continue prazosin 5 mg nightly for PTSD associated nightmares; doses to be adjusted as indicated   Continue trazodone 50 mg nightly for insomnia  Discharge disposition: LTSR

## 2024-11-20 NOTE — CASE MANAGEMENT
CM reviewed pt's status with provider Marie APONTE. Pt unable to return to apt at this time and will remain awaiting CRR placement.    (2) well flexed

## 2024-11-20 NOTE — NURSING NOTE
Pt is visible on the unit with limited peer interaction. Pt is calm and cooperative with care. Pt is medications and meals compliant. Pt denies all  psych s/s at this time. Will maintain q15 min checks.

## 2024-11-20 NOTE — ASSESSMENT & PLAN NOTE
Continue Depakote to 1000 mg nightly for daytime sedation   Most recent VPA level 54 on 10/12/24  CMP on 10/12/24 reviewed, WNL  Clozaril 900 mg nightly for schizoaffective disorder  Clozaril monitoring:   CRP, CBC/diff, CK QMon for monitoring  ANC 11/18/2024-2.88  Vital Signs stable   Most recent ECG reviewed 9/9/24 - normal ECG, NSR  Clozaril increased to 900 mg nightly on 10/29/24 - Clozaril level 894 on 11/04/24   Risperdal discontinued l on 10/28/24 for moderate affectivity  Ativan changed to Klonopin 0.5 mg daily/ 1 mg qhs    Continue melatonin 3 mg nightly for insomnia  Continue prazosin 5 mg nightly for PTSD associated nightmares; doses to be adjusted as indicated   Continue trazodone 50 mg nightly for insomnia  Discharge disposition: LTSR

## 2024-11-20 NOTE — PROGRESS NOTES
Progress Note - Behavioral Health   Name: Meli Nowak 59 y.o. female I MRN: [id number]  Unit/Bed#: [facility] 651-02 I Date of Admission: 7/23/2024   Date of Service: 11/20/2024 I Hospital Day: 120     Assessment & Plan  Schizoaffective disorder, bipolar type (HCC)   Decrease Depakote to 1000 mg nightly for daytime sedation   Most recent VPA level 54 on 10/12/24  CMP on 10/12/24 reviewed, WNL  Clozaril 900 mg nightly for schizoaffective disorder  Clozaril monitoring:   CRP, CBC/diff, CK QMon for monitoring  ANC 11/18/2024-2.88  Vital Signs stable   Most recent ECG reviewed 9/9/24 - normal ECG, NSR  Clozaril increased to 900 mg nightly on 10/29/24 - Clozaril level 894 on 11/04/24   Risperdal discontinued l on 10/28/24 for moderate affectivity  Ativan changed to Klonopin 0.5 mg daily/ 1 mg qhs    Continue melatonin 3 mg nightly for insomnia  Continue prazosin 5 mg nightly for PTSD associated nightmares; doses to be adjusted as indicated   Continue trazodone 50 mg nightly for insomnia  Discharge disposition: LTSR    ASHLIE (obstructive sleep apnea)  - f/u w/ Sleep Medicine in outpatient setting and consider CPAP as indicated      Plan   Progress Toward Goals:   Meli is progressing towards goals of inpatient psychiatric treatment by continued medication compliance and is attending therapeutic modalities on the milieu. However, the patient continues to require inpatient psychiatric hospitalization for continued medication management and titration to optimize symptom reduction, improve sleep hygiene, and demonstrate adequate self-care.    Recommended Treatment: Treatment plan and medication changes discussed and per the attending physician the plan is:    1.Continue with group therapy, milieu therapy and occupational therapy  2.Behavioral Health checks every 7 minutes  3.Continue frequent safety checks and vitals per unit protocol  4.Continue with SLIM medical management as indicated  5.Continue with current medication  regimen  6.Will review labs in the a.m.  7.Disposition Planning: Discharge planning and efforts remain ongoing    Behavioral Health Medications: all current active meds have been reviewed and continue current psychiatric medications.  Current Facility-Administered Medications   Medication Dose Route Frequency Provider Last Rate    acetaminophen  650 mg Oral Q4H PRN Marie Ziegler, CRNP      acetaminophen  650 mg Oral Q4H PRN Marie Ziegler, CRNP      acetaminophen  975 mg Oral Q6H PRN Marie Ziegler, CRNP      albuterol  2 puff Inhalation Q4H PRN Kristen Logan, CRNP      aluminum-magnesium hydroxide-simethicone  30 mL Oral Q4H PRN Parris Bolanos, CRNP      Artificial Tears  1 drop Both Eyes Q6H PRN Dereje Banuelos MD      atorvastatin  10 mg Oral Daily Marie Ziegler, CRNP      bisacodyl  10 mg Oral Daily PRN Kristen Logan, CRNP      bisacodyl  10 mg Rectal Daily PRN Marie Ziegler, CRNP      carvedilol  6.25 mg Oral BID With Meals Marie Ziegler, CRNP      clonazePAM  0.5 mg Oral Daily Marie Ziegler, CRNP      clonazePAM  1 mg Oral HS Marie Ziegler, CRNP      cloZAPine  900 mg Oral HS Marie Ziegler, CRNP      cyanocobalamin  1,000 mcg Oral Daily Marie Ziegler, CRNP      Diclofenac Sodium  2 g Topical 4x Daily PRN Marie Ziegler, CRNP      famotidine  20 mg Oral BID Shan Fitzgerald, DO      fluticasone  1 puff Inhalation Daily Marie Ziegler, CRNP      hydrOXYzine HCL  25 mg Oral Q6H PRN Max 4/day Marie Ziegler, CRNP      hydrOXYzine HCL  50 mg Oral Q6H PRN Max 4/day Marie Ziegler, CRNP      LORazepam  1 mg Intramuscular Q6H PRN Max 3/day Marie Ziegler, CRNP      LORazepam  1 mg Oral Q6H PRN Max 3/day Marie Ziegler, CRNP      melatonin  3 mg Oral HS Marie Ziegler, CRNP      mirtazapine  7.5 mg Oral HS PRN Marie Ziegler, CRNP      Multivitamin  15 mL Oral Daily Marie Ziegler, CRNP      OLANZapine  2.5 mg Oral Q6H PRN Dereje Banuelos MD      Or    OLANZapine  2.5 mg  Intramuscular Q6H PRN Dereje Banuelos MD      OLANZapine  5 mg Oral Q6H PRN Dereje Banuelos MD      Or    OLANZapine  5 mg Intramuscular Q6H PRN Dereje Banuelos MD      ondansetron  4 mg Oral Q6H PRN Darin Lawton MD      polyethylene glycol  17 g Oral Daily Kristen Ludwig Doreen, JOSE ELIAS      polyethylene glycol  17 g Oral Daily PRN JOSE ELIAS Figueroa      prazosin  5 mg Oral HS Marie Ziegler, JOSE ELIAS      senna-docusate sodium  2 tablet Oral HS Rehana Borrego PA-C      traZODone  50 mg Oral HS JOSE ELIAS Figueroa      valproic acid  1,000 mg Oral HS JOSE ELIAS Figueroa         Risks / Benefits of Treatment:  Risks, benefits, and possible side effects of medications explained to patient and patient verbalizes understanding and agreement for treatment.       Subjective    Patient was seen today for continuation of care, records reviewed and patient was discussed with the morning case review team. Meli was seen today for psychiatric follow-up.On assessment today, Meli was seen in her room. Less interactive with staff and peers, patient reports daytime sedation despite good sleeping pattern.Will decrease Depakene to 1000 mg qhs to improve sociability and sedation. AH chronic and fixed, patient is managing them well with no reports of discomfort. Uatsdin/Somatic preoccupation and Paranoia less prominent..Meli denies acute suicidal/self-harm ideation/intent/plan upon direct inquiry at this time.  Meli remains behaviorally appropriate, no agitation or aggression noted on exam or in report.  Impulse control is intact.  Meli remains adherent to her current psychotropic medication regimen and denies any side effects from medications, as well as none noted on exam. Meli is currently assessed as being at her baseline with continued need for medication management, supervision for safety and ADL’s. These services are not currently available in a less restrictive environment necessitating continued hospital stay.   Meli should remain on the unit until these services are available, due to likelihood of mental decompensation and readmission if discharged to an unsupervised setting.  Assertive discharge planning and collaboration with multiple providers (inpatient and community based) remains ongoing.  Meli is currently awaiting for LTSR. .         Psychiatric Review of Systems:  Behavior over the last 24 hours:  unchanged.   Sleep: good  Appetite: good  Medication side effects: none  ROS: no complaints, denies shortness of breath or chest pain and all other systems are negative for acute changes        Objective    Vitals:  Vitals:    11/20/24 0745   BP: 158/72   Pulse: 78   Resp: 17   Temp: 97.8 °F (36.6 °C)   SpO2: 93%       Laboratory Results:  I have personally reviewed all pertinent laboratory/tests results.  Most Recent Labs:   Lab Results   Component Value Date    WBC 7.90 11/18/2024    RBC 4.21 11/18/2024    HGB 13.1 11/18/2024    HCT 41.9 11/18/2024     11/18/2024    RDW 13.3 11/18/2024    NEUTROABS 2.88 11/18/2024    SODIUM 138 10/12/2024    K 4.1 10/12/2024     10/12/2024    CO2 29 10/12/2024    BUN 18 10/12/2024    CREATININE 0.97 10/12/2024    GLUC 146 (H) 10/12/2024    GLUF 98 08/10/2024    CALCIUM 9.2 10/12/2024    AST 12 (L) 10/12/2024    ALT 12 10/12/2024    ALKPHOS 84 10/12/2024    TP 6.7 10/12/2024    ALB 3.9 10/12/2024    TBILI 0.26 10/12/2024    VALPROICTOT 54 10/12/2024    AMMONIA 32 07/03/2024    [id number] 2.000 07/24/2024    HGBA1C 6.4 (H) 05/03/2024     05/03/2024       Mental Status Evaluation:  Appearance:  disheveled, marginal hygiene   Behavior:  cooperative, calm, bizarre, guarded   Speech:  normal rate and volume   Mood:  less anxious, less depressed   Affect:  constricted   Thought Process:  perseverative, concrete   Thought Content:  B[employer]ist and somatic preoccupation, paranoid ideation   Perceptual Disturbances: auditory hallucinations still present, but less frequent    Risk Potential: Suicidal ideation - None  Homicidal ideation - None  Potential for aggression - No   Memory:  recent and remote memory grossly intact   Sensorium  person, place, and time/date      Consciousness:  alert and awake   Attention: attention span and concentration are age appropriate   Insight:  limited   Judgment: limited   Gait/Station: normal gait/station   Motor Activity: no abnormal movements       Counseling / Coordination of Care:  Patient's progress reviewed with nursing staff.  Medications, treatment progress and treatment plan reviewed with patient.  Supportive counseling provided to the patient.    This note was completed in part utilizing Dragon dictation Software. Grammatical, translation, syntax errors, random word insertions, spelling mistakes, and incomplete sentences may be an occasional consequence of this system secondary to software limitations with voice recognition, ambient noise, and hardware issues. If you have any questions or concerns about the content, text, or information contained within the body of this dictation, please contact the provider for clarification

## 2024-11-20 NOTE — TREATMENT TEAM
11/20/24 0800   Team Meeting   Meeting Type Daily Rounds   Team Members Present   Team Members Present Physician;Nurse;;;Occupational Therapist   Physician Team Member Dr. Banuelos / Dr. Hurst / Dr. Bland / JAIR Ziegler   Nursing Team Member David   Care Management Team Member Manju / Tee   Social Work Team Member Anastacio   OT Team Member Eduardo   Patient/Family Present   Patient Present No   Patient's Family Present No     Pt is reported to have showered yesterday with assistance. Pt is reported to have taken albuteral. Pt is reported to attend groups and is reported to be calm and able to sit for longer periods of time, and appears to be less anxious.

## 2024-11-21 PROCEDURE — 99232 SBSQ HOSP IP/OBS MODERATE 35: CPT | Performed by: STUDENT IN AN ORGANIZED HEALTH CARE EDUCATION/TRAINING PROGRAM

## 2024-11-21 RX ADMIN — MELATONIN TAB 3 MG 3 MG: 3 TAB at 21:13

## 2024-11-21 RX ADMIN — CLONAZEPAM 0.5 MG: 0.5 TABLET ORAL at 09:06

## 2024-11-21 RX ADMIN — SENNOSIDES AND DOCUSATE SODIUM 2 TABLET: 8.6; 5 TABLET ORAL at 21:13

## 2024-11-21 RX ADMIN — POLYETHYLENE GLYCOL 3350 17 G: 17 POWDER, FOR SOLUTION ORAL at 09:05

## 2024-11-21 RX ADMIN — CLOZAPINE 900 MG: 200 TABLET ORAL at 21:13

## 2024-11-21 RX ADMIN — FLUTICASONE FUROATE 1 PUFF: 100 POWDER RESPIRATORY (INHALATION) at 09:06

## 2024-11-21 RX ADMIN — TRAZODONE HYDROCHLORIDE 50 MG: 50 TABLET ORAL at 21:13

## 2024-11-21 RX ADMIN — CYANOCOBALAMIN TAB 1000 MCG 1000 MCG: 1000 TAB at 09:07

## 2024-11-21 RX ADMIN — CLONAZEPAM 1 MG: 1 TABLET ORAL at 21:13

## 2024-11-21 RX ADMIN — CARVEDILOL 6.25 MG: 6.25 TABLET, FILM COATED ORAL at 09:06

## 2024-11-21 RX ADMIN — ATORVASTATIN CALCIUM 10 MG: 10 TABLET, FILM COATED ORAL at 09:06

## 2024-11-21 RX ADMIN — VALPROIC ACID 1000 MG: 500 SOLUTION ORAL at 21:13

## 2024-11-21 RX ADMIN — PRAZOSIN HYDROCHLORIDE 5 MG: 5 CAPSULE ORAL at 21:13

## 2024-11-21 RX ADMIN — Medication 15 ML: at 09:06

## 2024-11-21 RX ADMIN — CARVEDILOL 6.25 MG: 6.25 TABLET, FILM COATED ORAL at 16:39

## 2024-11-21 RX ADMIN — FAMOTIDINE 20 MG: 20 TABLET ORAL at 17:25

## 2024-11-21 RX ADMIN — FAMOTIDINE 20 MG: 20 TABLET ORAL at 09:07

## 2024-11-21 NOTE — NURSING NOTE
Pt is visible for meals and withdrawn to self . Pt is calm and cooperative with care . Pt is medications and meals complaint. Pt all s/s and offers no concerns at this time. Will maintain q 15 mins checks .

## 2024-11-21 NOTE — CASE MANAGEMENT
Call made to Marilu at [facility] intake, [phone], ext [phone]; reviewed pt's CRR referral. Marilu requesting to come in for interview with pt. Pt scheduled Tue 11/26 at 1 pm.     Call made to pt's [facility] tel# [phone], [facility] requesting c/b.

## 2024-11-21 NOTE — CASE MANAGEMENT
11/21/24 3454   Team Meeting   Meeting Type Daily Rounds   Team Members Present   Team Members Present Physician;Nurse;;;Occupational Therapist   Physician Team Member Dr. Banuelos / Dr. Bland / Dr. Hurst / JAIR Lopez   Nursing Team Member Clarissa / Dona   Care Management Team Member Tee / Manju   Social Work Team Member Eduardo   OT Team Member Anastacio   Patient/Family Present   Patient Present No   Patient's Family Present No       Pt is reported to be isolative to self though is attending groups. Pt is reported to appear calm while in groups. Pt was given Tyelnol to address arm and leg pain, which was effective.

## 2024-11-21 NOTE — PLAN OF CARE
Problem: Depression  Goal: Refrain from isolation  Description: Interventions:  - Develop a trusting relationship   - Encourage socialization   Outcome: Not Progressing     Problem: Prexisting or High Potential for Compromised Skin Integrity  Goal: Skin integrity is maintained or improved  Description: INTERVENTIONS:  - Identify patients at risk for skin breakdown  - Assess and monitor skin integrity  - Assess and monitor nutrition and hydration status  - Monitor labs   - Assess for incontinence   - Turn and reposition patient  - Assist with mobility/ambulation  - Relieve pressure over bony prominences  - Avoid friction and shearing  - Provide appropriate hygiene as needed including keeping skin clean and dry  - Evaluate need for skin moisturizer/barrier cream  - Collaborate with interdisciplinary team   - Patient/family teaching  - Consider wound care consult   Outcome: Progressing     Problem: Alteration in Thoughts and Perception  Goal: Verbalize thoughts and feelings  Description: Interventions:  - Promote a nonjudgmental and trusting relationship with the patient through active listening and therapeutic communication  - Assess patient's level of functioning, behavior and potential for risk  - Engage patient in 1 on 1 interactions  - Encourage patient to express fears, feelings, frustrations, and discuss symptoms    - [place] patient to reality, help patient recognize reality-based thinking   - Administer medications as ordered and assess for potential side effects  - Provide the patient education related to the signs and symptoms of the illness and desired effects of prescribed medications  Outcome: Progressing  Goal: Agree to be compliant with medication regime, as prescribed and report medication side effects  Description: Interventions:  - Offer appropriate PRN medication and supervise ingestion; conduct AIMS, as needed   Outcome: Progressing     Problem: Ineffective Coping  Goal: Free from restraint  events  Description: - Utilize least restrictive measures   - Provide behavioral interventions   - Redirect inappropriate behaviors   Outcome: Progressing     Problem: Risk for Self Injury/Neglect  Goal: Refrain from harming self  Description: Interventions:  - Monitor patient closely, per order  - Develop a trusting relationship  - Supervise medication ingestion, monitor effects and side effects   Outcome: Progressing

## 2024-11-21 NOTE — NURSING NOTE
Patient withdrawn to her room and listening to the radio. She is cooperative and compliant with medications. She denies anxiety, depression, SI and AVH.

## 2024-11-21 NOTE — PROGRESS NOTES
Progress Note - Behavioral Health   Name: Meli Nowak 59 y.o. female I MRN: [id number]  Unit/Bed#: [facility] 651-02 I Date of Admission: 7/23/2024   Date of Service: 11/21/2024 I Hospital Day: 121     Assessment & Plan  Schizoaffective disorder, bipolar type (HCC)   Continue Depakote to 1000 mg nightly for daytime sedation   Most recent VPA level 54 on 10/12/24  CMP on 10/12/24 reviewed, WNL  Clozaril 900 mg nightly for schizoaffective disorder  Clozaril monitoring:   CRP, CBC/diff, CK QMon for monitoring  ANC 11/18/2024-2.88  Vital Signs stable   Most recent ECG reviewed 9/9/24 - normal ECG, NSR  Clozaril increased to 900 mg nightly on 10/29/24 - Clozaril level 894 on 11/04/24   Risperdal discontinued l on 10/28/24 for moderate affectivity  Ativan changed to Klonopin 0.5 mg daily/ 1 mg qhs    Continue melatonin 3 mg nightly for insomnia  Continue prazosin 5 mg nightly for PTSD associated nightmares; doses to be adjusted as indicated   Continue trazodone 50 mg nightly for insomnia  Discharge disposition: LTSR    ASHLIE (obstructive sleep apnea)  - f/u w/ Sleep Medicine in outpatient setting and consider CPAP as indicated      Plan   Progress Toward Goals:   Meli is progressing towards goals of inpatient psychiatric treatment by continued medication compliance and is attending therapeutic modalities on the milieu. However, the patient continues to require inpatient psychiatric hospitalization for continued medication management and titration to optimize symptom reduction, improve sleep hygiene, and demonstrate adequate self-care.    Recommended Treatment: Treatment plan and medication changes discussed and per the attending physician the plan is:    1.Continue with group therapy, milieu therapy and occupational therapy  2.Behavioral Health checks every 7 minutes  3.Continue frequent safety checks and vitals per unit protocol  4.Continue with SLIM medical management as indicated  5.Continue with current medication  regimen  6.Will review labs in the a.m.  7.Disposition Planning: Discharge planning and efforts remain ongoing    Behavioral Health Medications: all current active meds have been reviewed.  Current Facility-Administered Medications   Medication Dose Route Frequency Provider Last Rate    acetaminophen  650 mg Oral Q4H PRN Marie Ziegler, CRNP      acetaminophen  650 mg Oral Q4H PRN Marie Ziegler, CRNP      acetaminophen  975 mg Oral Q6H PRN Marie Ziegler, CRNP      albuterol  2 puff Inhalation Q4H PRN Kristen Logan, CRNP      aluminum-magnesium hydroxide-simethicone  30 mL Oral Q4H PRN Parris Bolanos, CRNP      Artificial Tears  1 drop Both Eyes Q6H PRN Dereje Banuelos MD      atorvastatin  10 mg Oral Daily Marie Ziegler, CRNP      bisacodyl  10 mg Oral Daily PRN Kristen Logan, CRNP      bisacodyl  10 mg Rectal Daily PRN Marie Ziegler, CRNP      carvedilol  6.25 mg Oral BID With Meals Marie Ziegler, CRNP      clonazePAM  0.5 mg Oral Daily Marie Ziegler, CRNP      clonazePAM  1 mg Oral HS Marie Ziegler, CRNP      cloZAPine  900 mg Oral HS Marie Ziegler, CRNP      cyanocobalamin  1,000 mcg Oral Daily Marie Ziegler, CRNP      Diclofenac Sodium  2 g Topical 4x Daily PRN Marie Ziegler, CRNP      famotidine  20 mg Oral BID Shan Fitzgerald, DO      fluticasone  1 puff Inhalation Daily Marie Ziegler, CRNP      hydrOXYzine HCL  25 mg Oral Q6H PRN Max 4/day Marie Ziegler, CRNP      hydrOXYzine HCL  50 mg Oral Q6H PRN Max 4/day Marie Ziegler, CRNP      LORazepam  1 mg Intramuscular Q6H PRN Max 3/day Marie Ziegler, CRNP      LORazepam  1 mg Oral Q6H PRN Max 3/day Marie Ziegler, CRNP      melatonin  3 mg Oral HS Marie Ziegler, CRNP      mirtazapine  7.5 mg Oral HS PRN Marie Ziegler, CRNP      Multivitamin  15 mL Oral Daily Marie Ziegler, CRNP      OLANZapine  2.5 mg Oral Q6H PRN Dereje Banuelos MD      Or    OLANZapine  2.5 mg Intramuscular Q6H PRN Dereje Banuelos MD       OLANZapine  5 mg Oral Q6H PRN Dereje Banuelos MD      Or    OLANZapine  5 mg Intramuscular Q6H PRN Dereje Banuelos MD      ondansetron  4 mg Oral Q6H PRN Darin Lawton MD      polyethylene glycol  17 g Oral Daily Kristen Ludwig Doreen, JOSE ELIAS      polyethylene glycol  17 g Oral Daily PRN JOSE ELIAS Figueroa      prazosin  5 mg Oral HS JOSE ELIAS Figueroa      senna-docusate sodium  2 tablet Oral HS Rehana Borrego PA-C      traZODone  50 mg Oral HS JOSE ELIAS Figueroa      valproic acid  1,000 mg Oral HS JOSE ELIAS Figueroa         Risks / Benefits of Treatment:  Risks, benefits, and possible side effects of medications explained to patient and patient verbalizes understanding and agreement for treatment.       Subjective    Patient was seen today for continuation of care, records reviewed and patient was discussed with the morning case review team.    Meli was seen today for psychiatric follow-up.  On assessment today, Meli was lying in bed. Continues to report daytime sedation despite good sleep pattern. Depakene dose decreased to 1000 mg QHS to improve sedation and sociability 11/20/24. Noted to be more withdrawn to her room this morning. Attended some groups yesterday, intermittently visible and social on the unit. Endorses ongoing mild depression and anxiety, improving with current medication regime. Stable appetite and sleep. Restorationist/somatic preoccupation less prominent. Meli denies acute suicidal/self-harm ideation/intent/plan upon direct inquiry at this time.  Meli remains behaviorally appropriate, no agitation or aggression noted on exam or in report.  Meli also denies HI/AH/VH, and does not appear overtly manic.  No overt delusions or paranoia are verbalized. Impulse control is intact.  Meli remains adherent to her current psychotropic medication regimen and denies any side effects from medications, as well as none noted on exam.     Meli is currently assessed as being at her baseline with  continued need for medication management, supervision for safety and ADL’s. These services are not currently available in a less restrictive environment necessitating continued hospital stay.  Meli should remain on the unit until these services are available, due to likelihood of mental decompensation and readmission if discharged to an unsupervised setting.  Assertive discharge planning and collaboration with multiple providers (inpatient and community based) remains ongoing.  Meli is currently awaiting for LTSR.     Psychiatric Review of Systems:  Behavior over the last 24 hours:  unchanged.   Sleep: good  Appetite: good  Medication side effects: none  ROS: no complaints, denies shortness of breath or chest pain and all other systems are negative for acute changes        Objective    Vitals:  Vitals:    11/21/24 0820   BP: 131/62   Pulse: 91   Resp: 18   Temp: 97.5 °F (36.4 °C)   SpO2: 92%       Laboratory Results:  I have personally reviewed all pertinent laboratory/tests results.    Mental Status Evaluation:  Appearance:  disheveled, marginal hygiene   Behavior:  calm, agitated, bizarre, guarded   Speech:  normal rate and volume   Mood:  less anxious, less depressed   Affect:  constricted   Thought Process:  perseverative, concrete   Thought Content:  Islam and somatic preoccupation, paranoid ideation   Perceptual Disturbances: auditory hallucinations still present, but less frequent   Risk Potential: Suicidal ideation - None  Homicidal ideation - None  Potential for aggression - No   Memory:  recent and remote memory grossly intact   Sensorium  person, place, and time/date      Consciousness:  alert and awake   Attention: attention span and concentration are age appropriate   Insight:  limited   Judgment: limited   Gait/Station: normal gait/station   Motor Activity: no abnormal movements       Counseling / Coordination of Care:  Patient's progress reviewed with nursing staff.  Medications, treatment  progress and treatment plan reviewed with patient.  Supportive counseling provided to the patient.    This note was completed in part utilizing Dragon dictation Software. Grammatical, translation, syntax errors, random word insertions, spelling mistakes, and incomplete sentences may be an occasional consequence of this system secondary to software limitations with voice recognition, ambient noise, and hardware issues. If you have any questions or concerns about the content, text, or information contained within the body of this dictation, please contact the provider for clarification

## 2024-11-22 PROCEDURE — 99232 SBSQ HOSP IP/OBS MODERATE 35: CPT | Performed by: STUDENT IN AN ORGANIZED HEALTH CARE EDUCATION/TRAINING PROGRAM

## 2024-11-22 RX ADMIN — CYANOCOBALAMIN TAB 1000 MCG 1000 MCG: 1000 TAB at 08:26

## 2024-11-22 RX ADMIN — SENNOSIDES AND DOCUSATE SODIUM 2 TABLET: 8.6; 5 TABLET ORAL at 21:25

## 2024-11-22 RX ADMIN — FAMOTIDINE 20 MG: 20 TABLET ORAL at 08:28

## 2024-11-22 RX ADMIN — Medication 2 G: at 15:00

## 2024-11-22 RX ADMIN — MELATONIN TAB 3 MG 3 MG: 3 TAB at 21:25

## 2024-11-22 RX ADMIN — FAMOTIDINE 20 MG: 20 TABLET ORAL at 17:20

## 2024-11-22 RX ADMIN — POLYETHYLENE GLYCOL 3350 17 G: 17 POWDER, FOR SOLUTION ORAL at 08:26

## 2024-11-22 RX ADMIN — ACETAMINOPHEN 975 MG: 325 TABLET, FILM COATED ORAL at 07:04

## 2024-11-22 RX ADMIN — CARVEDILOL 6.25 MG: 6.25 TABLET, FILM COATED ORAL at 17:20

## 2024-11-22 RX ADMIN — ATORVASTATIN CALCIUM 10 MG: 10 TABLET, FILM COATED ORAL at 08:26

## 2024-11-22 RX ADMIN — CLONAZEPAM 1 MG: 1 TABLET ORAL at 21:25

## 2024-11-22 RX ADMIN — FLUTICASONE FUROATE 1 PUFF: 100 POWDER RESPIRATORY (INHALATION) at 08:31

## 2024-11-22 RX ADMIN — VALPROIC ACID 1000 MG: 500 SOLUTION ORAL at 21:28

## 2024-11-22 RX ADMIN — CLOZAPINE 900 MG: 200 TABLET ORAL at 21:24

## 2024-11-22 RX ADMIN — PRAZOSIN HYDROCHLORIDE 5 MG: 5 CAPSULE ORAL at 21:25

## 2024-11-22 RX ADMIN — Medication 15 ML: at 08:26

## 2024-11-22 RX ADMIN — CARVEDILOL 6.25 MG: 6.25 TABLET, FILM COATED ORAL at 08:23

## 2024-11-22 RX ADMIN — CLONAZEPAM 0.5 MG: 0.5 TABLET ORAL at 08:23

## 2024-11-22 RX ADMIN — TRAZODONE HYDROCHLORIDE 50 MG: 50 TABLET ORAL at 21:25

## 2024-11-22 NOTE — TREATMENT TEAM
11/22/24 0800   Team Meeting   Meeting Type Daily Rounds   Team Members Present   Team Members Present Physician;Nurse;;;Occupational Therapist   Physician Team Member Dr. Banuelos / Dr. Hurst / Dr. Bland / JAIR Ziegler   Nursing Team Member David   Care Management Team Member Manju / Tee   Social Work Team Member Anastaico   OT Team Member Eduardo   Patient/Family Present   Patient Present No   Patient's Family Present No     Pt is reported to not attend groups and ate. Pt is reported to deny all signs and symptoms. Pt is reported to have had leg pain though walking had good effect.

## 2024-11-22 NOTE — TREATMENT PLAN
TREATMENT PLAN REVIEW - Behavioral Health Meli Nowak 59 y.o. 1965 female MRN: [id number]    [facility] 6B [facility] Encounter: [id number]          Admit Date/Time:  7/23/2024  1:27 PM    Treatment Team:   MD Marie Carter CRNP Zanaya Daniel Jacqueline Almonte Kiersten Bealer Nikolas Feinsilber Elizabeth Hernandez Denielle Deily, RN Briana Faulstick Terry L Trittenbach, BERKLEY Newby, MSW  Vonnie Lopez    Diagnosis: Principal Problem:    Schizoaffective disorder, bipolar type (HCC)  Active Problems:    Medical clearance for psychiatric admission    Type 2 diabetes mellitus (HCC)    Obesity    Tobacco abuse    Hyperlipidemia    Esophageal reflux    Essential (primary) hypertension    Vitamin B12 deficiency    Hypoxia    Ambulatory dysfunction    ASHLIE (obstructive sleep apnea)      Patient Strengths/Assets: negotiates basic needs    Patient Barriers/Limitations: difficulty adapting, lack of social/family support, limited support system, patient is on an involuntary commitment, poor insight, poor past treatment response, poor reasoning ability, poor self-care    Short Term Goals: decrease in paranoid thoughts, decrease in psychotic symptoms, ability to stay safe on the unit, ability to stay free of restraints, improvement in ability to express basic needs, improvement in reasoning ability, sleep improvement, mood stabilization, increase in socialization with peers on the unit    Long Term Goals: improvement in depression, improvement in anxiety, stabilization of mood, free of suicidal thoughts, free of homicidal thoughts, resolution of psychotic symptoms, improvement in reality testing, improvement in reasoning ability, improved insight, able to express basic needs, adequate self care, adequate sleep, adequate oral intake, appropriate interaction with peers, stable living arrangements upon discharge    Progress  Towards Goals: continue psychiatric medications as prescribed, discharge planning    Recommended Treatment: medication management, patient medication education, group therapy, milieu therapy, continued Behavioral Health psychiatric evaluation/assessment process    Treatment Frequency: daily medication monitoring, group and milieu therapy daily, monitoring through interdisciplinary rounds, monitoring through weekly patient care conferences    Expected Discharge Date:  30 days    Discharge Plan: placement in alternative living arrangement, referral for outpatient medication management with a psychiatrist, referral for outpatient psychotherapy    Treatment Plan Created/Updated By: Dereje Banuelos MD

## 2024-11-22 NOTE — ASSESSMENT & PLAN NOTE
Legal Status: 304  Est. Date of Discharge: 12/31   Continue Depakote to 1000 mg nightly for daytime sedation   Most recent VPA level 54 on 10/12/24  CMP on 10/12/24 reviewed, WNL  Clozaril 900 mg nightly for schizoaffective disorder  Clozaril monitoring:   CRP, CBC/diff, CK QMon for monitoring  ANC 11/18/2024-2.88  Vital Signs stable   Most recent ECG reviewed 9/9/24 - normal ECG, NSR  Clozaril increased to 900 mg nightly on 10/29/24 - Clozaril level 894 on 11/04/24   Risperdal discontinued l on 10/28/24 for moderate affectivity  Ativan changed to Klonopin 0.5 mg daily/ 1 mg qhs    Continue melatonin 3 mg nightly for insomnia  Continue prazosin 5 mg nightly for PTSD associated nightmares; doses to be adjusted as indicated   Continue trazodone 50 mg nightly for insomnia  Discharge disposition: LTSR

## 2024-11-22 NOTE — PROGRESS NOTES
Progress Note - Behavioral Health   Name: Meli Nowak 59 y.o. female I MRN: [id number]  Unit/Bed#: [facility] 651-02 I Date of Admission: 7/23/2024   Date of Service: 11/22/2024 I Hospital Day: 122     Assessment & Plan  Schizoaffective disorder, bipolar type (HCC)  Legal Status: 304  Est. Date of Discharge: 12/31   Continue Depakote to 1000 mg nightly for daytime sedation   Most recent VPA level 54 on 10/12/24  CMP on 10/12/24 reviewed, WNL  Clozaril 900 mg nightly for schizoaffective disorder  Clozaril monitoring:   CRP, CBC/diff, CK QMon for monitoring  ANC 11/18/2024-2.88  Vital Signs stable   Most recent ECG reviewed 9/9/24 - normal ECG, NSR  Clozaril increased to 900 mg nightly on 10/29/24 - Clozaril level 894 on 11/04/24   Risperdal discontinued l on 10/28/24 for moderate affectivity  Ativan changed to Klonopin 0.5 mg daily/ 1 mg qhs    Continue melatonin 3 mg nightly for insomnia  Continue prazosin 5 mg nightly for PTSD associated nightmares; doses to be adjusted as indicated   Continue trazodone 50 mg nightly for insomnia  Discharge disposition: LTSR    ASHLIE (obstructive sleep apnea)  - f/u w/ Sleep Medicine in outpatient setting and consider CPAP as indicated      Plan   Progress Toward Goals:   Meli is progressing towards goals of inpatient psychiatric treatment by continued medication compliance and is attending therapeutic modalities on the milieu. However, the patient continues to require inpatient psychiatric hospitalization for continued medication management and titration to optimize symptom reduction, improve sleep hygiene, and demonstrate adequate self-care.    Recommended Treatment: Treatment plan and medication changes discussed and per the attending physician the plan is:    1.Continue with group therapy, milieu therapy and occupational therapy  2.Behavioral Health checks every 7 minutes  3.Continue frequent safety checks and vitals per unit protocol  4.Continue with SLIM medical management as  indicated  5.Continue with current medication regimen  6.Will review labs in the a.m.  7.Disposition Planning: Discharge planning and efforts remain ongoing    Behavioral Health Medications: all current active meds have been reviewed and continue current psychiatric medications.  Current Facility-Administered Medications   Medication Dose Route Frequency Provider Last Rate    acetaminophen  650 mg Oral Q4H PRN Marie Ziegler, CRNP      acetaminophen  650 mg Oral Q4H PRN Marie Ziegler, CRNP      acetaminophen  975 mg Oral Q6H PRN Marie Ziegler, CRNP      albuterol  2 puff Inhalation Q4H PRN Kristen Logan, CRNP      aluminum-magnesium hydroxide-simethicone  30 mL Oral Q4H PRN Parris Bolanos, CRNP      Artificial Tears  1 drop Both Eyes Q6H PRN Dereje Banuelos MD      atorvastatin  10 mg Oral Daily Marie Ziegler, CRNP      bisacodyl  10 mg Oral Daily PRN Kristen Logan, CRNP      bisacodyl  10 mg Rectal Daily PRN Marie Ziegler, CRNP      carvedilol  6.25 mg Oral BID With Meals Marie Ziegler, CRNP      clonazePAM  0.5 mg Oral Daily Marie Ziegler, CRNP      clonazePAM  1 mg Oral HS Marie Ziegler, CRNP      cloZAPine  900 mg Oral HS Marie Ziegler, CRNP      cyanocobalamin  1,000 mcg Oral Daily Marie Ziegler, CRNP      Diclofenac Sodium  2 g Topical 4x Daily PRN Marie Ziegler, CRNP      famotidine  20 mg Oral BID Shan Fitzgerald DO      fluticasone  1 puff Inhalation Daily Marie Ziegler, CRNP      hydrOXYzine HCL  25 mg Oral Q6H PRN Max 4/day Marie Ziegler, CRNP      hydrOXYzine HCL  50 mg Oral Q6H PRN Max 4/day Marie Ziegler, CRNP      LORazepam  1 mg Intramuscular Q6H PRN Max 3/day Marie Ziegler, CRNP      LORazepam  1 mg Oral Q6H PRN Max 3/day Marie Ziegler, CRNP      melatonin  3 mg Oral HS Marie Ziegler, CRNP      mirtazapine  7.5 mg Oral HS PRN Marie Ziegler, CRNP      Multivitamin  15 mL Oral Daily Marie Ziegler, CRNP      OLANZapine  2.5 mg Oral Q6H PRN  "Dereje Banuelos MD      Or    OLANZapine  2.5 mg Intramuscular Q6H PRN Dereje Banuelos MD      OLANZapine  5 mg Oral Q6H PRN Dereje Banuelos MD      Or    OLANZapine  5 mg Intramuscular Q6H PRN Dereje Banuelos MD      ondansetron  4 mg Oral Q6H PRN Darin Lawton MD      polyethylene glycol  17 g Oral Daily Kirsten Logan, JOSE ELIAS      polyethylene glycol  17 g Oral Daily PRN JOSE ELIAS Figueroa      prazosin  5 mg Oral HS JOSE ELIAS Figueroa      senna-docusate sodium  2 tablet Oral HS Rehana Borrego PA-C      traZODone  50 mg Oral HS JOSE ELIAS Figueroa      valproic acid  1,000 mg Oral HS JOSE ELIAS Figueroa         Risks / Benefits of Treatment:  Risks, benefits, and possible side effects of medications explained to patient and patient verbalizes understanding and agreement for treatment.       Subjective    Patient was seen today for continuation of care, records reviewed and patient was discussed with the morning case review team.    Meli was seen today for psychiatric follow-up.  On assessment today, Meli was seen sitting by the window. More visible today, she is brighter on approach.  Stating that she still feels \"tired \", she voices her desire to attend group and maybe apply her mascara.  Auditory hallucinations significantly decreased, patient managing symptoms well and uses distraction as a coping skill.  Mood slowly improving, sleep and appetite unchanged.Meli denies acute suicidal/self-harm ideation/intent/plan upon direct inquiry at this time.  Meli remains behaviorally appropriate, no agitation or aggression noted on exam or in report. . Impulse control is intact.  Meli remains adherent to his current psychotropic medication regimen and denies any side effects from medications, as well as none noted on exam.    Meli is currently assessed as being at their baseline with continued need for medication management, supervision for safety and ADL’s. These services are not currently " available in a less restrictive environment necessitating continued hospital stay.  Meli should remain on the unit until these services are available, due to likelihood of mental decompensation and readmission if discharged to an unsupervised setting.  Assertive discharge planning and collaboration with multiple providers (inpatient and community based) remains ongoing.  Meli is currently awaiting for CRR.     Psychiatric Review of Systems:  Behavior over the last 24 hours:  unchanged.   Sleep: good  Appetite: good  Medication side effects: none  ROS: no complaints, denies shortness of breath or chest pain and all other systems are negative for acute changes        Objective    Vitals:  Vitals:    11/22/24 0823   BP: 122/57   Pulse: 82   Resp:    Temp:    SpO2:        Laboratory Results:  I have personally reviewed all pertinent laboratory/tests results.  Most Recent Labs:   Lab Results   Component Value Date    WBC 7.90 11/18/2024    RBC 4.21 11/18/2024    HGB 13.1 11/18/2024    HCT 41.9 11/18/2024     11/18/2024    RDW 13.3 11/18/2024    NEUTROABS 2.88 11/18/2024    SODIUM 138 10/12/2024    K 4.1 10/12/2024     10/12/2024    CO2 29 10/12/2024    BUN 18 10/12/2024    CREATININE 0.97 10/12/2024    GLUC 146 (H) 10/12/2024    GLUF 98 08/10/2024    CALCIUM 9.2 10/12/2024    AST 12 (L) 10/12/2024    ALT 12 10/12/2024    ALKPHOS 84 10/12/2024    TP 6.7 10/12/2024    ALB 3.9 10/12/2024    TBILI 0.26 10/12/2024    VALPROICTOT 54 10/12/2024    AMMONIA 32 07/03/2024    FOI2OSELGLCY 2.000 07/24/2024    HGBA1C 6.4 (H) 05/03/2024     05/03/2024       Mental Status Evaluation:  Appearance:  disheveled, improved grooming   Behavior:  cooperative, calm   Speech:  normal rate and volume   Mood:  less anxious, less depressed   Affect:  constricted   Thought Process:  perseverative, concrete   Thought Content:  Muslim and somatic preoccupation, paranoid ideation   Perceptual Disturbances: auditory  hallucinations still present, but less frequent   Risk Potential: Suicidal ideation - None  Homicidal ideation - None  Potential for aggression - No   Memory:  recent and remote memory grossly intact   Sensorium  person, place, and time/date      Consciousness:  alert and awake   Attention: attention span and concentration are age appropriate   Insight:  limited   Judgment: limited   Gait/Station: normal gait/station   Motor Activity: no abnormal movements       Counseling / Coordination of Care:  Patient's progress reviewed with nursing staff.  Medications, treatment progress and treatment plan reviewed with patient.  Supportive counseling provided to the patient.    This note was completed in part utilizing Dragon dictation Software. Grammatical, translation, syntax errors, random word insertions, spelling mistakes, and incomplete sentences may be an occasional consequence of this system secondary to software limitations with voice recognition, ambient noise, and hardware issues. If you have any questions or concerns about the content, text, or information contained within the body of this dictation, please contact the provider for clarification

## 2024-11-22 NOTE — TREATMENT TEAM
11/22/24 1440   Team Meeting   Meeting Type Tx Team Meeting   Initial Conference Date 11/22/24   Team Members Present   Team Members Present Physician;Nurse;   Physician Team Member Dr Banuelos   Nursing Team Member Sarahi   Care Management Team Member Arlette   Patient/Family Present   Patient Present Yes   Patient's Family Present No     Treatment plan and goals reviewed with pt. Pt in agreement with ongoing inpt treatment and participation in groups.

## 2024-11-22 NOTE — NURSING NOTE
Patient withdrawn to room on this shift. Patient stated her legs were in pain and was made aware that pain medication was just administered and PRN order is in place. Patient medication compliant and denies any psych s/s and no further c/o or concerns expressed. Rounding continued.

## 2024-11-22 NOTE — PLAN OF CARE
Problem: Risk for Self Injury/Neglect  Goal: Attend and participate in unit activities, including therapeutic, recreational, and educational groups  Description: Interventions:  - Provide therapeutic and educational activities daily, encourage attendance and participation, and document same in the medical record  - Obtain collateral information, encourage visitation and family involvement in care   Outcome: Not Progressing     Problem: DISCHARGE PLANNING - CARE MANAGEMENT  Goal: Discharge to post-acute care or home with appropriate resources  Description: INTERVENTIONS:  - Conduct assessment to determine patient/family and health care team treatment goals, and need for post-acute services based on payer coverage, community resources, and patient preferences, and barriers to discharge  - Address psychosocial, clinical, and financial barriers to discharge as identified in assessment in conjunction with the patient/family and health care team  - Arrange appropriate level of post-acute services according to patient’s   needs and preference and payer coverage in collaboration with the physician and health care team  - Communicate with and update the patient/family, physician, and health care team regarding progress on the discharge plan  - Arrange appropriate transportation to post-acute venues  Outcome: Progressing     Problem: Alteration in Thoughts and Perception  Goal: Verbalize thoughts and feelings  Description: Interventions:  - Promote a nonjudgmental and trusting relationship with the patient through active listening and therapeutic communication  - Assess patient's level of functioning, behavior and potential for risk  - Engage patient in 1 on 1 interactions  - Encourage patient to express fears, feelings, frustrations, and discuss symptoms    - [place] patient to reality, help patient recognize reality-based thinking   - Administer medications as ordered and assess for potential side effects  - Provide the  patient education related to the signs and symptoms of the illness and desired effects of prescribed medications  Outcome: Progressing     Problem: Risk for Self Injury/Neglect  Goal: Verbalize thoughts and feelings  Description: Interventions:  - Assess and re-assess patient's lethality and potential for self-injury  - Engage patient in 1:1 interactions, daily, for a minimum of 15 minutes  - Encourage patient to express feelings, fears, frustrations, hopes  - Establish rapport/trust with patient   Outcome: Progressing  Goal: Refrain from harming self  Description: Interventions:  - Monitor patient closely, per order  - Develop a trusting relationship  - Supervise medication ingestion, monitor effects and side effects   Outcome: Progressing     Problem: Potential for Falls  Goal: Patient will remain free of falls  Description: INTERVENTIONS:  - Educate patient on patient safety including physical limitations  - Instruct patient to call for assistance with activity   - Consult OT/PT to assist with strengthening/mobility   - Keep Call bell within reach  - Keep bed low and locked with side rails adjusted as appropriate  - Keep care items and personal belongings within reach  - Initiate and maintain comfort rounds  - Offer Toileting every 2 Hours, in advance of need  - Initiate/Maintain bed and chair alarm  - Obtain necessary fall risk management equipment: walker, wheelchair  - Apply yellow socks and bracelet for high fall risk patients  - Patient moved to room near nurses station  Outcome: Progressing

## 2024-11-22 NOTE — CASE MANAGEMENT
CM met with pt, informed pt of in person interview with [facility] on Tues. Pt in agreement with CRR placement and interview. Pt expressed concern for her cat. CM informed pt that cat will not be permitted at [facility].

## 2024-11-22 NOTE — NURSING NOTE
Patient visible in the unit. She is cooperative and compliant with medications. She denies anxiety, depression, SI and AVH.

## 2024-11-22 NOTE — TREATMENT TEAM
Pt attended afternoon group.  Pt was bright and pleasant.  Pt indicated she would like to decorate her room with making snowflakes.  Shared creative ideas for expression. Pt also shared her interest in music and engaged.       11/22/24 0915 11/22/24 1000 11/22/24 1330   Activity/Group Checklist   Group Exercise Other (Comment)  (Community meeting: [facility]) Other (Comment)  (Creative expression and reminiscing)   Attendance Did not attend Did not attend Attended   Attendance Duration (min)  --   --  46-60   Interactions  --   --  Interacted appropriately   Affect/Mood  --   --  Bright   Goals Achieved  --   --  Identified feelings;Identified triggers;Discussed coping strategies;Able to engage in interactions;Able to listen to others;Able to reflect/comment on own behavior;Able to manage/cope with feelings;Verbalized increased hopefulness;Able to self-disclose;Able to recieve feedback

## 2024-11-23 PROCEDURE — 99232 SBSQ HOSP IP/OBS MODERATE 35: CPT

## 2024-11-23 RX ADMIN — VALPROIC ACID 1000 MG: 500 SOLUTION ORAL at 21:03

## 2024-11-23 RX ADMIN — CLONAZEPAM 1 MG: 1 TABLET ORAL at 21:00

## 2024-11-23 RX ADMIN — MELATONIN TAB 3 MG 3 MG: 3 TAB at 21:00

## 2024-11-23 RX ADMIN — SENNOSIDES AND DOCUSATE SODIUM 2 TABLET: 8.6; 5 TABLET ORAL at 21:01

## 2024-11-23 RX ADMIN — TRAZODONE HYDROCHLORIDE 50 MG: 50 TABLET ORAL at 21:01

## 2024-11-23 RX ADMIN — ATORVASTATIN CALCIUM 10 MG: 10 TABLET, FILM COATED ORAL at 08:24

## 2024-11-23 RX ADMIN — FAMOTIDINE 20 MG: 20 TABLET ORAL at 08:24

## 2024-11-23 RX ADMIN — CLOZAPINE 900 MG: 200 TABLET ORAL at 21:00

## 2024-11-23 RX ADMIN — Medication 15 ML: at 08:23

## 2024-11-23 RX ADMIN — FAMOTIDINE 20 MG: 20 TABLET ORAL at 17:15

## 2024-11-23 RX ADMIN — PRAZOSIN HYDROCHLORIDE 5 MG: 5 CAPSULE ORAL at 21:00

## 2024-11-23 RX ADMIN — CYANOCOBALAMIN TAB 1000 MCG 1000 MCG: 1000 TAB at 08:24

## 2024-11-23 RX ADMIN — CARVEDILOL 6.25 MG: 6.25 TABLET, FILM COATED ORAL at 15:41

## 2024-11-23 RX ADMIN — CLONAZEPAM 0.5 MG: 0.5 TABLET ORAL at 08:24

## 2024-11-23 RX ADMIN — POLYETHYLENE GLYCOL 3350 17 G: 17 POWDER, FOR SOLUTION ORAL at 08:22

## 2024-11-23 RX ADMIN — FLUTICASONE FUROATE 1 PUFF: 100 POWDER RESPIRATORY (INHALATION) at 08:24

## 2024-11-23 NOTE — PROGRESS NOTES
Progress Note - Behavioral Health   Name: Meli Nowak 59 y.o. female I MRN: [id number]   Unit/Bed#: [facility] 651-02 I Date of Admission: 7/23/2024   Date of Service: 11/23/2024 I Hospital Day: 123         Assessment & Plan  Schizoaffective disorder, bipolar type (HCC)  Legal Status: 304  Est. Date of Discharge: 12/31   Continue Depakote to 1000 mg nightly for daytime sedation   Most recent VPA level 54 on 10/12/24  CMP on 10/12/24 reviewed, WNL  Clozaril 900 mg nightly for schizoaffective disorder  Clozaril monitoring:   CRP, CBC/diff, CK QMon for monitoring  ANC 11/18/2024-2.88  Vital Signs stable   Most recent ECG reviewed 9/9/24 - normal ECG, NSR  Clozaril increased to 900 mg nightly on 10/29/24 - Clozaril level 894 on 11/04/24   Risperdal discontinued l on 10/28/24 for moderate affectivity  Ativan changed to Klonopin 0.5 mg daily/ 1 mg qhs    Continue melatonin 3 mg nightly for insomnia  Continue prazosin 5 mg nightly for PTSD associated nightmares; doses to be adjusted as indicated   Continue trazodone 50 mg nightly for insomnia  Discharge disposition: LTSR    ASHLIE (obstructive sleep apnea)  - f/u w/ Sleep Medicine in outpatient setting and consider CPAP as indicated        Recommended Treatment:     Treatment plan and medication changes discussed and per the attending physician the plan is:     1.Continue with group therapy, milieu therapy and occupational therapy  2.Behavioral Health checks every 15 minutes  3.Continue frequent safety checks and vitals per unit protocol  4.Continue with SLIM medical management as indicated  5.Continue with current medication regimen  6.Will review labs in the a.m.  7.Disposition Planning: Discharge planning and efforts remain ongoing     Planned medication and treatment changes:    All current active medications have been reviewed  Encourage group therapy, milieu therapy and occupational therapy  Behavioral Health checks for safety monitoring  Continue treatment with group  therapy, milieu therapy and occupational therapy  This patient is stable and ready for discharge, however, even at baseline, patient continues with poor insight, judgement and coping that pose a risk to patient in a homeless shelter, or otherwise unsupervised setting. Case management is assertively/actively working on securing the necessary level of care.  Continue current medications:    Current medications:  Current Facility-Administered Medications   Medication Dose Route Frequency Provider Last Rate    acetaminophen  650 mg Oral Q4H PRN Marie Ziegler, CRNP      acetaminophen  650 mg Oral Q4H PRN Marie Ziegler, CRNP      acetaminophen  975 mg Oral Q6H PRN Marie Ziegler, CRNP      albuterol  2 puff Inhalation Q4H PRN Kristen Logan, JOSE ELIAS      aluminum-magnesium hydroxide-simethicone  30 mL Oral Q4H PRN Parris Bolanos, JOSE ELIAS      Artificial Tears  1 drop Both Eyes Q6H PRN Dereje Banuelos MD      atorvastatin  10 mg Oral Daily Marie Ziegler, CRNP      bisacodyl  10 mg Oral Daily PRN Kristen Logan, CHRISTOPHERNP      bisacodyl  10 mg Rectal Daily PRN Marie Ziegler, CHRISTOPHERNP      carvedilol  6.25 mg Oral BID With Meals Marie Ziegler, CHRISTOPHERNP      clonazePAM  0.5 mg Oral Daily Marie Ziegler, CRNP      clonazePAM  1 mg Oral HS Marie Ziegler, CRNP      cloZAPine  900 mg Oral HS Marie Ziegler, CRNP      cyanocobalamin  1,000 mcg Oral Daily Marie Ziegler, CRNP      Diclofenac Sodium  2 g Topical 4x Daily PRN Marie Ziegler, CRNP      famotidine  20 mg Oral BID Shan Fitzgerald DO      fluticasone  1 puff Inhalation Daily Marie Ziegler, CRNP      hydrOXYzine HCL  25 mg Oral Q6H PRN Max 4/day Marie Ziegler, CRNP      hydrOXYzine HCL  50 mg Oral Q6H PRN Max 4/day Marie Ziegler, CRNP      LORazepam  1 mg Intramuscular Q6H PRN Max 3/day Marie Ziegler, CRNP      LORazepam  1 mg Oral Q6H PRN Max 3/day Marie Ziegler, CRNP      melatonin  3 mg Oral HS Marie Ziegler, CRNP      mirtazapine  7.5 mg  "Oral HS PRN Marie Ziegler, JOSE ELIAS      Multivitamin  15 mL Oral Daily JOSE ELIAS Figueroa      OLANZapine  2.5 mg Oral Q6H PRN Dereje Banuelos MD      Or    OLANZapine  2.5 mg Intramuscular Q6H PRN Dereje Banuelos MD      OLANZapine  5 mg Oral Q6H PRN Dereje Banuelos MD      Or    OLANZapine  5 mg Intramuscular Q6H PRN Dereje Banuelos MD      ondansetron  4 mg Oral Q6H PRN Darin Lawton MD      polyethylene glycol  17 g Oral Daily Kristen Tomasjimmy Logan, JOSE ELIAS      polyethylene glycol  17 g Oral Daily PRN JOSE ELIAS Figueroa      prazosin  5 mg Oral HS JOSE ELIAS Figueroa      senna-docusate sodium  2 tablet Oral HS Rehana Borrego PA-C      traZODone  50 mg Oral HS JOSE ELIAS Figueroa      valproic acid  1,000 mg Oral HS JOSE ELIAS Figueroa         Risks / Benefits of Treatment:    Risks, benefits, and possible side effects of medications explained to patient and patient verbalizes understanding and agreement for treatment.    Subjective:    Behavior over the last 24 hours: unchanged.     Per staff, Meli was calm and cooperative on the unit yesterday.  She attended group.  She has interview with [facility] on Tuesday.  Awaiting CRR placement    Meli was seen in her room for psychiatric follow up.  She was scant with the interview today.  She reports her mood is \"fine\".  She makes limited eye contact with this writer.  She slept well last night and has a good appetite.  She reports having daily BMs with last being yesterday.   She is denying side effects from medications.  She reports no AH currently but occasionally hears voices but can ignore them.  She denies VH.  She denies suicidal or homicidal ideation intent or plan.     Sleep: normal  Appetite: normal  Medication side effects: No   ROS: no complaints    Mental Status Evaluation:    Appearance:  disheveled, marginal hygiene   Behavior:  calm, guarded   Speech:  scant, soft   Mood:  mildly anxious   Affect:  blunted   Thought Process:  concrete "   Associations: concrete associations   Thought Content:  no overt delusions   Perceptual Disturbances: auditory hallucinations still present, but less frequent, no visual hallucinations   Risk Potential: Suicidal ideation - None  Homicidal ideation - None  Potential for aggression - No   Sensorium:  oriented to person, place, and time/date   Memory:  recent memory intact   Consciousness:  alert and awake   Attention/Concentration: attention span and concentration appear shorter than expected for age   Insight:  limited   Judgment: limited   Gait/Station: normal gait/station   Motor Activity: no abnormal movements     Vital signs in last 24 hours:    Temp:  [97.8 °F (36.6 °C)-98.2 °F (36.8 °C)] 97.8 °F (36.6 °C)  HR:  [70-78] 78  BP: ()/(51-60) 93/51  Resp:  [18-20] 18  SpO2:  [92 %-96 %] 96 %  O2 Device: None (Room air)         Laboratory results: I have personally reviewed all pertinent laboratory/tests results    Results from the past 24 hours: No results found for this or any previous visit (from the past 24 hours).  Most Recent Labs:   Lab Results   Component Value Date    WBC 7.90 11/18/2024    RBC 4.21 11/18/2024    HGB 13.1 11/18/2024    HCT 41.9 11/18/2024     11/18/2024    RDW 13.3 11/18/2024    NEUTROABS 2.88 11/18/2024    SODIUM 138 10/12/2024    K 4.1 10/12/2024     10/12/2024    CO2 29 10/12/2024    BUN 18 10/12/2024    CREATININE 0.97 10/12/2024    GLUC 146 (H) 10/12/2024    CALCIUM 9.2 10/12/2024    AST 12 (L) 10/12/2024    ALT 12 10/12/2024    ALKPHOS 84 10/12/2024    TP 6.7 10/12/2024    ALB 3.9 10/12/2024    TBILI 0.26 10/12/2024    VALPROICTOT 54 10/12/2024    AMMONIA 32 07/03/2024    YOF7MGSPOOVC 2.000 07/24/2024    HGBA1C 6.4 (H) 05/03/2024     05/03/2024       Suicide/Homicide Risk Assessment:    Risk of Harm to Self:   Nursing Suicide Risk Assessment Last 24 hours: C-SSRS Risk (Since Last Contact)  Calculated C-SSRS Risk Score (Since Last Contact): No Risk  Indicated  Current Specific Risk Factors include: mental illness diagnosis  Protective Factors: no current suicidal ideation, ability to communicate with staff on the unit, able to contract for safety on the unit, taking medications as ordered on the unit, improved mood, improved depressive symptoms, improved anxiety symptoms, improved psychotic symptoms, responds to redirection  Based on today's assessment, Meli presents the following risk of harm to self: low    Risk of Harm to Others:  Nursing Homicide Risk Assessment: Violence Risk to Others: Denies within past 6 months  Current Specific Risk Factors include: social difficulties  Protective Factors: no current homicidal ideation, mood is stabilizing, psychotic symptoms are less prominent, compliant with medications on the unit as ordered, follows staff redirection  Based on today's assessment, [place] presents the following risk of harm to others: low    The following interventions are recommended: Behavioral Health checks for safety monitoring, continued hospitalization on locked unit    Progress Toward Goals: progressing, attends groups, participates in milieu therapy, mood is stabilizing, working on coping skills, discharge planning    Counseling / Coordination of Care:    Total floor / unit time spent today 30 minutes. Greater than 50% of total time was spent with the patient and / or family counseling and / or coordination of care. A description of counseling / coordination of care:    Administrative Statements   I have spent a total time of 30 minutes in caring for this patient on the day of the visit/encounter including Diagnostic results, Documenting in the medical record, Reviewing / ordering tests, medicine, procedures  , Obtaining or reviewing history  , and Communicating with other healthcare professionals .    JOSE ELIAS Malik 11/23/24

## 2024-11-23 NOTE — NURSING NOTE
"Patient is medication and meal compliant. No complaints of pain or discomfort. Patient is visible on the unit and social with select peers. Patient denies depression or anxiety and reports she is doing \"ok\".  Will continue to monitor patient status.  "

## 2024-11-23 NOTE — PLAN OF CARE
Problem: Alteration in Thoughts and Perception  Goal: Treatment Goal: Gain control of psychotic behaviors/thinking, reduce/eliminate presenting symptoms and demonstrate improved reality functioning upon discharge  Outcome: Progressing  Goal: Verbalize thoughts and feelings  Description: Interventions:  - Promote a nonjudgmental and trusting relationship with the patient through active listening and therapeutic communication  - Assess patient's level of functioning, behavior and potential for risk  - Engage patient in 1 on 1 interactions  - Encourage patient to express fears, feelings, frustrations, and discuss symptoms    - [place] patient to reality, help patient recognize reality-based thinking   - Administer medications as ordered and assess for potential side effects  - Provide the patient education related to the signs and symptoms of the illness and desired effects of prescribed medications  Outcome: Progressing  Goal: Refrain from acting on delusional thinking/internal stimuli  Description: Interventions:  - Monitor patient closely, per order   - Utilize least restrictive measures   - Set reasonable limits, give positive feedback for acceptable   - Administer medications as ordered and monitor of potential side effects  Outcome: Progressing  Goal: Agree to be compliant with medication regime, as prescribed and report medication side effects  Description: Interventions:  - Offer appropriate PRN medication and supervise ingestion; conduct AIMS, as needed   Outcome: Progressing  Goal: Attend and participate in unit activities, including therapeutic, recreational, and educational groups  Description: Interventions:  -Encourage Visitation and family involvement in care  Outcome: Progressing  Goal: Recognize dysfunctional thoughts, communicate reality-based thoughts at the time of discharge  Description: Interventions:  - Provide medication and psycho-education to assist patient in compliance and developing  insight into his/her illness   Outcome: Progressing  Goal: Complete daily ADLs, including personal hygiene independently, as able  Description: Interventions:  - Observe, teach, and assist patient with ADLS  - Monitor and promote a balance of rest/activity, with adequate nutrition and elimination   Outcome: Progressing     Problem: Depression  Goal: Treatment Goal: Demonstrate behavioral control of depressive symptoms, verbalize feelings of improved mood/affect, and adopt new coping skills prior to discharge  Outcome: Progressing  Goal: Verbalize thoughts and feelings  Description: Interventions:  - Assess and re-assess patient's level of risk   - Engage patient in 1:1 interactions, daily, for a minimum of 15 minutes   - Encourage patient to express feelings, fears, frustrations, hopes   Outcome: Progressing  Goal: Refrain from isolation  Description: Interventions:  - Develop a trusting relationship   - Encourage socialization   Outcome: Progressing  Goal: Refrain from self-neglect  Outcome: Progressing  Goal: Attend and participate in unit activities, including therapeutic, recreational, and educational groups  Description: Interventions:  - Provide therapeutic and educational activities daily, encourage attendance and participation, and document same in the medical record   Outcome: Progressing     Problem: Anxiety  Goal: Anxiety is at manageable level  Description: Interventions:  - Assess and monitor patient's anxiety level.   - Monitor for signs and symptoms (heart palpitations, chest pain, shortness of breath, headaches, nausea, feeling jumpy, restlessness, irritable, apprehensive).   - Collaborate with interdisciplinary team and initiate plan and interventions as ordered.  - [place] patient to unit/surroundings  - Explain treatment plan  - Encourage participation in care  - Encourage verbalization of concerns/fears  - Identify coping mechanisms  - Assist in developing anxiety-reducing skills  -  Administer/offer alternative therapies  - Limit or eliminate stimulants  Outcome: Progressing     Problem: Potential for Falls  Goal: Patient will remain free of falls  Description: INTERVENTIONS:  - Educate patient on patient safety including physical limitations  - Instruct patient to call for assistance with activity   - Consult OT/PT to assist with strengthening/mobility   - Keep Call bell within reach  - Keep bed low and locked with side rails adjusted as appropriate  - Keep care items and personal belongings within reach  - Initiate and maintain comfort rounds  - Offer Toileting every 2 Hours, in advance of need  - Initiate/Maintain bed and chair alarm  - Obtain necessary fall risk management equipment: walker, wheelchair  - Apply yellow socks and bracelet for high fall risk patients  - Patient moved to room near nurses station  Outcome: Progressing

## 2024-11-24 PROCEDURE — 99232 SBSQ HOSP IP/OBS MODERATE 35: CPT

## 2024-11-24 RX ADMIN — POLYETHYLENE GLYCOL 3350 17 G: 17 POWDER, FOR SOLUTION ORAL at 08:11

## 2024-11-24 RX ADMIN — CLOZAPINE 900 MG: 200 TABLET ORAL at 21:45

## 2024-11-24 RX ADMIN — ATORVASTATIN CALCIUM 10 MG: 10 TABLET, FILM COATED ORAL at 08:13

## 2024-11-24 RX ADMIN — FAMOTIDINE 20 MG: 20 TABLET ORAL at 08:12

## 2024-11-24 RX ADMIN — MELATONIN TAB 3 MG 3 MG: 3 TAB at 22:17

## 2024-11-24 RX ADMIN — SENNOSIDES AND DOCUSATE SODIUM 2 TABLET: 8.6; 5 TABLET ORAL at 21:47

## 2024-11-24 RX ADMIN — CARVEDILOL 6.25 MG: 6.25 TABLET, FILM COATED ORAL at 16:01

## 2024-11-24 RX ADMIN — PRAZOSIN HYDROCHLORIDE 5 MG: 5 CAPSULE ORAL at 21:45

## 2024-11-24 RX ADMIN — CLONAZEPAM 1 MG: 1 TABLET ORAL at 21:45

## 2024-11-24 RX ADMIN — CLONAZEPAM 0.5 MG: 0.5 TABLET ORAL at 08:12

## 2024-11-24 RX ADMIN — CARVEDILOL 6.25 MG: 6.25 TABLET, FILM COATED ORAL at 07:40

## 2024-11-24 RX ADMIN — TRAZODONE HYDROCHLORIDE 50 MG: 50 TABLET ORAL at 21:45

## 2024-11-24 RX ADMIN — FLUTICASONE FUROATE 1 PUFF: 100 POWDER RESPIRATORY (INHALATION) at 08:11

## 2024-11-24 RX ADMIN — VALPROIC ACID 1000 MG: 500 SOLUTION ORAL at 21:45

## 2024-11-24 RX ADMIN — FAMOTIDINE 20 MG: 20 TABLET ORAL at 17:05

## 2024-11-24 RX ADMIN — CYANOCOBALAMIN TAB 1000 MCG 1000 MCG: 1000 TAB at 08:12

## 2024-11-24 RX ADMIN — Medication 15 ML: at 08:11

## 2024-11-24 NOTE — PROGRESS NOTES
Progress Note - Behavioral Health   Name: Meli Nowak 59 y.o. female I MRN: [id number]   Unit/Bed#: [facility] 651-02 I Date of Admission: 7/23/2024   Date of Service: 11/24/2024 I Hospital Day: 124         Assessment & Plan  Schizoaffective disorder, bipolar type (HCC)  Legal Status: 304  Est. Date of Discharge: 12/31   Continue Depakote to 1000 mg nightly for daytime sedation   Most recent VPA level 54 on 10/12/24  CMP on 10/12/24 reviewed, WNL  Clozaril 900 mg nightly for schizoaffective disorder  Clozaril monitoring:   CRP, CBC/diff, CK QMon for monitoring  ANC 11/18/2024-2.88  Vital Signs stable   Most recent ECG reviewed 9/9/24 - normal ECG, NSR  Clozaril increased to 900 mg nightly on 10/29/24 - Clozaril level 894 on 11/04/24   Risperdal discontinued l on 10/28/24 for moderate affectivity  Ativan changed to Klonopin 0.5 mg daily/ 1 mg qhs    Continue melatonin 3 mg nightly for insomnia  Continue prazosin 5 mg nightly for PTSD associated nightmares; doses to be adjusted as indicated   Continue trazodone 50 mg nightly for insomnia  Discharge disposition: LTSR    ASHLIE (obstructive sleep apnea)  - f/u w/ Sleep Medicine in outpatient setting and consider CPAP as indicated        Recommended Treatment:     Treatment plan and medication changes discussed and per the attending physician the plan is:     1.Continue with group therapy, milieu therapy and occupational therapy  2.Behavioral Health checks every 15 minutes  3.Continue frequent safety checks and vitals per unit protocol  4.Continue with SLIM medical management as indicated  5.Continue with current medication regimen  6.Will review labs in the a.m.  7.Disposition Planning: Discharge planning and efforts remain ongoing     Planned medication and treatment changes:    All current active medications have been reviewed  Encourage group therapy, milieu therapy and occupational therapy  Behavioral Health checks for safety monitoring  Continue treatment with group  therapy, milieu therapy and occupational therapy  This patient is stable and ready for discharge, however, even at baseline, patient continues with poor insight, judgement and coping that pose a risk to patient in a homeless shelter, or otherwise unsupervised setting. Case management is assertively/actively working on securing the necessary level of care.  Continue current medications:    Current medications:  Current Facility-Administered Medications   Medication Dose Route Frequency Provider Last Rate    acetaminophen  650 mg Oral Q4H PRN Marie Ziegler, CRNP      acetaminophen  650 mg Oral Q4H PRN Marie Ziegler, CRNP      acetaminophen  975 mg Oral Q6H PRN Marie Ziegler, CRNP      albuterol  2 puff Inhalation Q4H PRN Kristen Logan, JOSE ELIAS      aluminum-magnesium hydroxide-simethicone  30 mL Oral Q4H PRN Parris Bolanos, JOSE ELIAS      Artificial Tears  1 drop Both Eyes Q6H PRN Dereje Banuelos MD      atorvastatin  10 mg Oral Daily Marie Ziegler, CRNP      bisacodyl  10 mg Oral Daily PRN Kristen Logan, CHRISTOPHERNP      bisacodyl  10 mg Rectal Daily PRN Marie Ziegler, CHRISTOPHERNP      carvedilol  6.25 mg Oral BID With Meals Marie Ziegler, CHRISTOPHERNP      clonazePAM  0.5 mg Oral Daily Marie Ziegler, CRNP      clonazePAM  1 mg Oral HS Marie Ziegler, CRNP      cloZAPine  900 mg Oral HS Marie Ziegler, CRNP      cyanocobalamin  1,000 mcg Oral Daily Marie Ziegler, CRNP      Diclofenac Sodium  2 g Topical 4x Daily PRN Marie Ziegler, CRNP      famotidine  20 mg Oral BID Shan Fitzgerald DO      fluticasone  1 puff Inhalation Daily Marie Ziegler, CRNP      hydrOXYzine HCL  25 mg Oral Q6H PRN Max 4/day Marie Ziegler, CRNP      hydrOXYzine HCL  50 mg Oral Q6H PRN Max 4/day Marie Ziegler, CRNP      LORazepam  1 mg Intramuscular Q6H PRN Max 3/day Marie Ziegler, CRNP      LORazepam  1 mg Oral Q6H PRN Max 3/day Marie Ziegler, CRNP      melatonin  3 mg Oral HS Marie Ziegler, CRNP      mirtazapine  7.5 mg  "Oral HS PRN Marie Ziegler, JOSE ELIAS      Multivitamin  15 mL Oral Daily JOSE ELIAS Figueroa      OLANZapine  2.5 mg Oral Q6H PRN Dereje Banuelos MD      Or    OLANZapine  2.5 mg Intramuscular Q6H PRN Dereje Banuelos MD      OLANZapine  5 mg Oral Q6H PRN Dereje Banuelos MD      Or    OLANZapine  5 mg Intramuscular Q6H PRN Dereje Banuelos MD      ondansetron  4 mg Oral Q6H PRN Darin Lawton MD      polyethylene glycol  17 g Oral Daily Kristen Tomasjimmy Logan, JOSE ELIAS      polyethylene glycol  17 g Oral Daily PRN JOSE ELIAS Figueroa      prazosin  5 mg Oral HS JOSE ELIAS Figueroa      senna-docusate sodium  2 tablet Oral HS Rehana Borrego PA-C      traZODone  50 mg Oral HS JOSE ELIAS Figueroa      valproic acid  1,000 mg Oral HS JOSE ELIAS Figueroa         Risks / Benefits of Treatment:    Risks, benefits, and possible side effects of medications explained to patient and patient verbalizes understanding and agreement for treatment.    Subjective:    Behavior over the last 24 hours: unchanged.     Per staff, Meli was behaviorally controlled on the unit yesterday.  She has been meal and medication compliant.  She is visible and social with select peers.  Awaiting CRR placement    Meli was seen in her room for psychiatric follow up.  Meli was pleasant and superficially cooperative with the interview today.  She reports she was upset yesterday but she does not remember why.  She states her mood is \"okay\" today.  She reports she slept well last night and her appetite is good. She is reporting having daily BMs.  She is denying AH/VH today and does not appear to be responding to internal stimuli.  She denies suicidal or homicidal ideation intent or plan.     Sleep: normal  Appetite: normal  Medication side effects: No   ROS: no complaints    Mental Status Evaluation:    Appearance:  casually dressed, adequate grooming   Behavior:  calm, slightly less guarded   Speech:  normal rate and volume   Mood:  mildly anxious "   Affect:  constricted   Thought Process:  concrete   Associations: concrete associations   Thought Content:  no overt delusions   Perceptual Disturbances: denies auditory or visual hallucinations when asked, does not appear responding to internal stimuli   Risk Potential: Suicidal ideation - None  Homicidal ideation - None  Potential for aggression - No   Sensorium:  oriented to person, place, and time/date   Memory:  recent memory intact   Consciousness:  alert and awake   Attention/Concentration: attention span and concentration appear shorter than expected for age   Insight:  limited   Judgment: limited   Gait/Station: normal gait/station   Motor Activity: no abnormal movements     Vital signs in last 24 hours:    Temp:  [97.6 °F (36.4 °C)-98.2 °F (36.8 °C)] 97.8 °F (36.6 °C)  HR:  [72-74] 74  BP: (117-140)/(59-69) 117/60  Resp:  [14-18] 14  SpO2:  [92 %-97 %] 92 %  O2 Device: None (Room air)         Laboratory results: I have personally reviewed all pertinent laboratory/tests results    Results from the past 24 hours: No results found for this or any previous visit (from the past 24 hours).  Most Recent Labs:   Lab Results   Component Value Date    WBC 7.90 11/18/2024    RBC 4.21 11/18/2024    HGB 13.1 11/18/2024    HCT 41.9 11/18/2024     11/18/2024    RDW 13.3 11/18/2024    NEUTROABS 2.88 11/18/2024    SODIUM 138 10/12/2024    K 4.1 10/12/2024     10/12/2024    CO2 29 10/12/2024    BUN 18 10/12/2024    CREATININE 0.97 10/12/2024    GLUC 146 (H) 10/12/2024    CALCIUM 9.2 10/12/2024    AST 12 (L) 10/12/2024    ALT 12 10/12/2024    ALKPHOS 84 10/12/2024    TP 6.7 10/12/2024    ALB 3.9 10/12/2024    TBILI 0.26 10/12/2024    VALPROICTOT 54 10/12/2024    AMMONIA 32 07/03/2024    ZOI0DAKZIQME 2.000 07/24/2024    HGBA1C 6.4 (H) 05/03/2024     05/03/2024       Suicide/Homicide Risk Assessment:    Risk of Harm to Self:   Nursing Suicide Risk Assessment Last 24 hours: C-SSRS Risk (Since Last  Contact)  Calculated C-SSRS Risk Score (Since Last Contact): No Risk Indicated  Current Specific Risk Factors include: mental illness diagnosis  Protective Factors: no current suicidal ideation, ability to communicate with staff on the unit, able to contract for safety on the unit, taking medications as ordered on the unit, improved mood, improved depressive symptoms, improved anxiety symptoms, improved psychotic symptoms, responds to redirection  Based on today's assessment, [place] presents the following risk of harm to self: low    Risk of Harm to Others:  Nursing Homicide Risk Assessment: Violence Risk to Others: Denies within past 6 months  Current Specific Risk Factors include: social difficulties  Protective Factors: no current homicidal ideation, mood is stabilizing, psychotic symptoms are less prominent, compliant with medications on the unit as ordered, follows staff redirection  Based on today's assessment, Meli presents the following risk of harm to others: low    The following interventions are recommended: Behavioral Health checks for safety monitoring, continued hospitalization on locked unit    Progress Toward Goals: progressing, attends groups, participates in milieu therapy, working on coping skills, discharge planning    Counseling / Coordination of Care:    Total floor / unit time spent today 30 minutes. Greater than 50% of total time was spent with the patient and / or family counseling and / or coordination of care. A description of counseling / coordination of care:    Administrative Statements   I have spent a total time of 30 minutes in caring for this patient on the day of the visit/encounter including Diagnostic results, Documenting in the medical record, Reviewing / ordering tests, medicine, procedures  , Obtaining or reviewing history  , and Communicating with other healthcare professionals .    JOSE ELIAS Malik 11/24/24

## 2024-11-24 NOTE — NURSING NOTE
Patient is medication and meal compliant. Patient denies pain or discomfort. Patient denies symptoms of depression or anxiety. Patient is currently awaiting placement at this time. Will continue to monitor patient status.

## 2024-11-25 LAB
BASOPHILS # BLD AUTO: 0.07 THOUSANDS/ΜL (ref 0–0.1)
BASOPHILS NFR BLD AUTO: 1 % (ref 0–1)
CK SERPL-CCNC: 46 U/L (ref 26–192)
CRP SERPL QL: 1.4 MG/L
EOSINOPHIL # BLD AUTO: 0.11 THOUSAND/ΜL (ref 0–0.61)
EOSINOPHIL NFR BLD AUTO: 1 % (ref 0–6)
ERYTHROCYTE [DISTWIDTH] IN BLOOD BY AUTOMATED COUNT: 13.4 % (ref 11.6–15.1)
HCT VFR BLD AUTO: 42 % (ref 34.8–46.1)
HGB BLD-MCNC: 13.5 G/DL (ref 11.5–15.4)
IMM GRANULOCYTES # BLD AUTO: 0.02 THOUSAND/UL (ref 0–0.2)
IMM GRANULOCYTES NFR BLD AUTO: 0 % (ref 0–2)
LYMPHOCYTES # BLD AUTO: 3.91 THOUSANDS/ΜL (ref 0.6–4.47)
LYMPHOCYTES NFR BLD AUTO: 51 % (ref 14–44)
MCH RBC QN AUTO: 32 PG (ref 26.8–34.3)
MCHC RBC AUTO-ENTMCNC: 32.1 G/DL (ref 31.4–37.4)
MCV RBC AUTO: 100 FL (ref 82–98)
MONOCYTES # BLD AUTO: 0.77 THOUSAND/ΜL (ref 0.17–1.22)
MONOCYTES NFR BLD AUTO: 10 % (ref 4–12)
NEUTROPHILS # BLD AUTO: 2.89 THOUSANDS/ΜL (ref 1.85–7.62)
NEUTS SEG NFR BLD AUTO: 37 % (ref 43–75)
NRBC BLD AUTO-RTO: 0 /100 WBCS
PLATELET # BLD AUTO: 214 THOUSANDS/UL (ref 149–390)
PMV BLD AUTO: 9.9 FL (ref 8.9–12.7)
RBC # BLD AUTO: 4.22 MILLION/UL (ref 3.81–5.12)
WBC # BLD AUTO: 7.77 THOUSAND/UL (ref 4.31–10.16)

## 2024-11-25 PROCEDURE — 85025 COMPLETE CBC W/AUTO DIFF WBC: CPT

## 2024-11-25 PROCEDURE — 82550 ASSAY OF CK (CPK): CPT

## 2024-11-25 PROCEDURE — 99232 SBSQ HOSP IP/OBS MODERATE 35: CPT

## 2024-11-25 PROCEDURE — 86140 C-REACTIVE PROTEIN: CPT

## 2024-11-25 RX ORDER — VALPROIC ACID 250 MG/5ML
750 SOLUTION ORAL
Status: DISCONTINUED | OUTPATIENT
Start: 2024-11-25 | End: 2024-11-29

## 2024-11-25 RX ADMIN — VALPROIC ACID 750 MG: 500 SOLUTION ORAL at 21:39

## 2024-11-25 RX ADMIN — ACETAMINOPHEN 975 MG: 325 TABLET, FILM COATED ORAL at 09:31

## 2024-11-25 RX ADMIN — SENNOSIDES AND DOCUSATE SODIUM 2 TABLET: 8.6; 5 TABLET ORAL at 21:32

## 2024-11-25 RX ADMIN — MELATONIN TAB 3 MG 3 MG: 3 TAB at 21:31

## 2024-11-25 RX ADMIN — Medication 15 ML: at 08:42

## 2024-11-25 RX ADMIN — ATORVASTATIN CALCIUM 10 MG: 10 TABLET, FILM COATED ORAL at 08:42

## 2024-11-25 RX ADMIN — TRAZODONE HYDROCHLORIDE 50 MG: 50 TABLET ORAL at 21:31

## 2024-11-25 RX ADMIN — FLUTICASONE FUROATE 1 PUFF: 100 POWDER RESPIRATORY (INHALATION) at 08:46

## 2024-11-25 RX ADMIN — FAMOTIDINE 20 MG: 20 TABLET ORAL at 17:04

## 2024-11-25 RX ADMIN — POLYETHYLENE GLYCOL 3350 17 G: 17 POWDER, FOR SOLUTION ORAL at 08:36

## 2024-11-25 RX ADMIN — CLOZAPINE 900 MG: 200 TABLET ORAL at 21:31

## 2024-11-25 RX ADMIN — PRAZOSIN HYDROCHLORIDE 5 MG: 5 CAPSULE ORAL at 21:31

## 2024-11-25 RX ADMIN — CYANOCOBALAMIN TAB 1000 MCG 1000 MCG: 1000 TAB at 08:42

## 2024-11-25 RX ADMIN — CARVEDILOL 6.25 MG: 6.25 TABLET, FILM COATED ORAL at 07:57

## 2024-11-25 RX ADMIN — CLONAZEPAM 1 MG: 1 TABLET ORAL at 21:31

## 2024-11-25 RX ADMIN — CLONAZEPAM 0.5 MG: 0.5 TABLET ORAL at 08:43

## 2024-11-25 RX ADMIN — FAMOTIDINE 20 MG: 20 TABLET ORAL at 08:42

## 2024-11-25 RX ADMIN — ACETAMINOPHEN 975 MG: 325 TABLET, FILM COATED ORAL at 16:10

## 2024-11-25 NOTE — TREATMENT TEAM
11/25/24 0800   Team Meeting   Meeting Type Daily Rounds   Team Members Present   Team Members Present Physician;Nurse;;;Occupational Therapist   Physician Team Member Dr. Banuelos / Dr. Hurst / Dr. Bland / JAIR Ziegler   Nursing Team Member David   Care Management Team Member Manju / Tee   Social Work Team Member Anastacio   OT Team Member Eduardo   Patient/Family Present   Patient Present No   Patient's Family Present No     Pt is reported to have been withdrawn to Pt's room aside for meals and snacks. Pt denies all signs and symptoms.

## 2024-11-25 NOTE — NURSING NOTE
Patient is medication and meal compliant.  Patient complained of bilateral shoulder pain and requested medication for same.  PRN Tylenol given at 0931 with effectiveness noted at this time and decrease in pain reported. Patient denies depression or anxiety and is currently awaiting placement at this time. Will continue to monitor patient status.

## 2024-11-25 NOTE — ASSESSMENT & PLAN NOTE
Decrease Depakote to 750mg qhs, for daytime sedation   Most recent VPA level 54 on 10/12/24  CMP on 10/12/24 reviewed, WNL  Clozaril 900 mg nightly for schizoaffective disorder  Clozaril monitoring:   CRP, CBC/diff, CK QMon for monitoring  ANC 11/25/2024-2.89  Vital Signs stable   Most recent ECG reviewed 9/9/24 - normal ECG, NSR  Clozaril increased to 900 mg nightly on 10/29/24 - Clozaril level 894 on 11/04/24   Risperdal discontinued l on 10/28/24 for moderate affectivity  Ativan changed to Klonopin 0.5 mg daily/ 1 mg qhs    Continue melatonin 3 mg nightly for insomnia  Continue prazosin 5 mg nightly for PTSD associated nightmares; doses to be adjusted as indicated   Continue trazodone 50 mg nightly for insomnia  Discharge disposition: LTSR

## 2024-11-25 NOTE — CASE MANAGEMENT
CM spoke with pt's BCM Conchita Hall, informed her of pt's interview with [facility] tomorrow at 1 pm. Conchita in agreement with attending interview.

## 2024-11-25 NOTE — PLAN OF CARE
Problem: Alteration in Thoughts and Perception  Goal: Treatment Goal: Gain control of psychotic behaviors/thinking, reduce/eliminate presenting symptoms and demonstrate improved reality functioning upon discharge  Outcome: Progressing  Goal: Verbalize thoughts and feelings  Description: Interventions:  - Promote a nonjudgmental and trusting relationship with the patient through active listening and therapeutic communication  - Assess patient's level of functioning, behavior and potential for risk  - Engage patient in 1 on 1 interactions  - Encourage patient to express fears, feelings, frustrations, and discuss symptoms    - Spur patient to reality, help patient recognize reality-based thinking   - Administer medications as ordered and assess for potential side effects  - Provide the patient education related to the signs and symptoms of the illness and desired effects of prescribed medications  Outcome: Progressing  Goal: Refrain from acting on delusional thinking/internal stimuli  Description: Interventions:  - Monitor patient closely, per order   - Utilize least restrictive measures   - Set reasonable limits, give positive feedback for acceptable   - Administer medications as ordered and monitor of potential side effects  Outcome: Progressing  Goal: Agree to be compliant with medication regime, as prescribed and report medication side effects  Description: Interventions:  - Offer appropriate PRN medication and supervise ingestion; conduct AIMS, as needed   Outcome: Progressing  Goal: Recognize dysfunctional thoughts, communicate reality-based thoughts at the time of discharge  Description: Interventions:  - Provide medication and psycho-education to assist patient in compliance and developing insight into his/her illness   Outcome: Progressing  Goal: Complete daily ADLs, including personal hygiene independently, as able  Description: Interventions:  - Observe, teach, and assist patient with ADLS  - Monitor and  promote a balance of rest/activity, with adequate nutrition and elimination   Outcome: Progressing     Problem: Depression  Goal: Treatment Goal: Demonstrate behavioral control of depressive symptoms, verbalize feelings of improved mood/affect, and adopt new coping skills prior to discharge  Outcome: Progressing  Goal: Verbalize thoughts and feelings  Description: Interventions:  - Assess and re-assess patient's level of risk   - Engage patient in 1:1 interactions, daily, for a minimum of 15 minutes   - Encourage patient to express feelings, fears, frustrations, hopes   Outcome: Progressing  Goal: Refrain from isolation  Description: Interventions:  - Develop a trusting relationship   - Encourage socialization   Outcome: Progressing  Goal: Refrain from self-neglect  Outcome: Progressing     Problem: Anxiety  Goal: Anxiety is at manageable level  Description: Interventions:  - Assess and monitor patient's anxiety level.   - Monitor for signs and symptoms (heart palpitations, chest pain, shortness of breath, headaches, nausea, feeling jumpy, restlessness, irritable, apprehensive).   - Collaborate with interdisciplinary team and initiate plan and interventions as ordered.  - [place] patient to unit/surroundings  - Explain treatment plan  - Encourage participation in care  - Encourage verbalization of concerns/fears  - Identify coping mechanisms  - Assist in developing anxiety-reducing skills  - Administer/offer alternative therapies  - Limit or eliminate stimulants  Outcome: Progressing

## 2024-11-25 NOTE — NURSING NOTE
Patient is pleasant, calm and cooperative. Denies all psych s/s. Brightens upon approach. Medication compliant. Patient is withdrawn to room throughout shift. Patient is able to make needs known. Safety checks ongoing.

## 2024-11-25 NOTE — PROGRESS NOTES
Progress Note - Behavioral Health   Name: Meli Nowak 59 y.o. female I MRN: [id number]  Unit/Bed#: [facility] 651-02 I Date of Admission: 7/23/2024   Date of Service: 11/25/2024 I Hospital Day: 125     Assessment & Plan  Schizoaffective disorder, bipolar type (HCC)     Decrease Depakote to 750mg qhs, for daytime sedation   Most recent VPA level 54 on 10/12/24  CMP on 10/12/24 reviewed, WNL  Clozaril 900 mg nightly for schizoaffective disorder  Clozaril monitoring:   CRP, CBC/diff, CK QMon for monitoring  ANC 11/25/2024-2.89  Vital Signs stable   Most recent ECG reviewed 9/9/24 - normal ECG, NSR  Clozaril increased to 900 mg nightly on 10/29/24 - Clozaril level 894 on 11/04/24   Risperdal discontinued l on 10/28/24 for moderate affectivity  Ativan changed to Klonopin 0.5 mg daily/ 1 mg qhs    Continue melatonin 3 mg nightly for insomnia  Continue prazosin 5 mg nightly for PTSD associated nightmares; doses to be adjusted as indicated   Continue trazodone 50 mg nightly for insomnia  Discharge disposition: LTSR    ASHLIE (obstructive sleep apnea)  - f/u w/ Sleep Medicine in outpatient setting and consider CPAP as indicated      Plan   Progress Toward Goals:   Meli is progressing towards goals of inpatient psychiatric treatment by continued medication compliance and is attending therapeutic modalities on the milieu. However, the patient continues to require inpatient psychiatric hospitalization for continued medication management and titration to optimize symptom reduction, improve sleep hygiene, and demonstrate adequate self-care.    Recommended Treatment: Treatment plan and medication changes discussed and per the attending physician the plan is:    1.Continue with group therapy, milieu therapy and occupational therapy  2.Behavioral Health checks every 7 minutes  3.Continue frequent safety checks and vitals per unit protocol  4.Continue with SLIM medical management as indicated  5.Continue with current medication  regimen  6.Will review labs in the a.m.  7.Disposition Planning: Discharge planning and efforts remain ongoing    Behavioral Health Medications: all current active meds have been reviewed and continue current psychiatric medications.  Current Facility-Administered Medications   Medication Dose Route Frequency Provider Last Rate    acetaminophen  650 mg Oral Q4H PRN Marie Ziegler, CRNP      acetaminophen  650 mg Oral Q4H PRN Marie Ziegler, CRNP      acetaminophen  975 mg Oral Q6H PRN Marie Ziegler, CRNP      albuterol  2 puff Inhalation Q4H PRN Kristen Logan, CRNP      aluminum-magnesium hydroxide-simethicone  30 mL Oral Q4H PRN Parris Bolanos, CRNP      Artificial Tears  1 drop Both Eyes Q6H PRN Dereje Banuelos MD      atorvastatin  10 mg Oral Daily Marie Ziegler, CRNP      bisacodyl  10 mg Oral Daily PRN Kristen Logan, CRNP      bisacodyl  10 mg Rectal Daily PRN Marie Ziegler, CRNP      carvedilol  6.25 mg Oral BID With Meals Marie Ziegler, CRNP      clonazePAM  0.5 mg Oral Daily Marie Ziegler, CRNP      clonazePAM  1 mg Oral HS Marie Ziegler, CRNP      cloZAPine  900 mg Oral HS Marie Ziegler, CRNP      cyanocobalamin  1,000 mcg Oral Daily Marie Ziegler, CRNP      Diclofenac Sodium  2 g Topical 4x Daily PRN Marie Ziegler, CRNP      famotidine  20 mg Oral BID Shan Fitzgerald, DO      fluticasone  1 puff Inhalation Daily Marie Ziegler, CRNP      hydrOXYzine HCL  25 mg Oral Q6H PRN Max 4/day Marie Ziegler, CRNP      hydrOXYzine HCL  50 mg Oral Q6H PRN Max 4/day Marie Ziegler, CRNP      LORazepam  1 mg Intramuscular Q6H PRN Max 3/day Marie Ziegler, CRNP      LORazepam  1 mg Oral Q6H PRN Max 3/day Marie Ziegler, CRNP      melatonin  3 mg Oral HS Marie Ziegler, CRNP      mirtazapine  7.5 mg Oral HS PRN Marie Ziegler, CRNP      Multivitamin  15 mL Oral Daily Marie Ziegler, CRNP      OLANZapine  2.5 mg Oral Q6H PRN Dereje Banuelos MD      Or    OLANZapine  2.5 mg  Intramuscular Q6H PRN Dereje Banuelos MD      OLANZapine  5 mg Oral Q6H PRN Dereje Banuelos MD      Or    OLANZapine  5 mg Intramuscular Q6H PRN Dereje Banuelos MD      ondansetron  4 mg Oral Q6H PRN Darin Lawton MD      polyethylene glycol  17 g Oral Daily Kristen Ludwig Doreen, JOSE ELIAS      polyethylene glycol  17 g Oral Daily PRN JOSE ELIAS Figueroa      prazosin  5 mg Oral HS Marie Ziegler, JOSE ELIAS      senna-docusate sodium  2 tablet Oral HS Rehana Borrego PA-C      traZODone  50 mg Oral HS JOSE ELIAS Figueroa      valproic acid  1,000 mg Oral HS JOSE ELIAS Figueroa         Risks / Benefits of Treatment:  Risks, benefits, and possible side effects of medications explained to patient and patient verbalizes understanding and agreement for treatment.       Subjective    Legal Status: 304  Patient was seen today for continuation of care, records reviewed and patient was discussed with the morning case review team.    Meli was seen today for psychiatric follow-up.  On assessment today, Meli was seated by the window. Brighter on approach, she expresses optimism about going to group home but that she is saddened that her cat will not be able to join her. AH not as bothersome. She remains disheveled but self-care has improved. Will decrease Depakote to 750 mg daily for daytime sedation. CBC, CK and CRP obtained for continued Clozaril management. ANC 2.89. Remaining labs WNL. Meli denies acute suicidal/self-harm ideation/intent/plan upon direct inquiry at this time.  Meli remains behaviorally appropriate, no agitation or aggression noted on exam or in report.  Impulse control is intact.  Meli remains adherent to her current psychotropic medication regimen and denies any side effects from medications, as well as none noted on exam.    Meli is currently assessed as being at their baseline with continued need for medication management, supervision for safety and ADL’s. These services are not currently available  in a less restrictive environment necessitating continued hospital stay.  Meli should remain on the unit until these services are available, due to likelihood of mental decompensation and readmission if discharged to an unsupervised setting.  Assertive discharge planning and collaboration with multiple providers (inpatient and community based) remains ongoing.  Meli is currently awaiting for LTSR. Est. Date of Discharge: 12/31    Psychiatric Review of Systems:  Behavior over the last 24 hours:  unchanged.   Sleep: good  Appetite: good  Medication side effects: none  ROS: no complaints, denies shortness of breath or chest pain and all other systems are negative for acute changes        Objective    Vitals:  Vitals:    11/25/24 0734   BP: 128/76   Pulse: 77   Resp: 17   Temp: 97.8 °F (36.6 °C)   SpO2: 98%       Laboratory Results:  I have personally reviewed all pertinent laboratory/tests results.  Most Recent Labs:   Lab Results   Component Value Date    WBC 7.77 11/25/2024    RBC 4.22 11/25/2024    HGB 13.5 11/25/2024    HCT 42.0 11/25/2024     11/25/2024    RDW 13.4 11/25/2024    NEUTROABS 2.89 11/25/2024    SODIUM 138 10/12/2024    K 4.1 10/12/2024     10/12/2024    CO2 29 10/12/2024    BUN 18 10/12/2024    CREATININE 0.97 10/12/2024    GLUC 146 (H) 10/12/2024    GLUF 98 08/10/2024    CALCIUM 9.2 10/12/2024    AST 12 (L) 10/12/2024    ALT 12 10/12/2024    ALKPHOS 84 10/12/2024    TP 6.7 10/12/2024    ALB 3.9 10/12/2024    TBILI 0.26 10/12/2024    VALPROICTOT 54 10/12/2024    AMMONIA 32 07/03/2024    [id number] 2.000 07/24/2024    HGBA1C 6.4 (H) 05/03/2024     05/03/2024       Mental Status Evaluation:  Appearance:  disheveled, marginal hygiene   Behavior:  cooperative, calm   Speech:  normal rate and volume   Mood:  less anxious, less depressed   Affect:  constricted   Thought Process:  perseverative, concrete   Thought Content:  Zoroastrianism preoccupation, paranoid ideation   Perceptual  Disturbances: auditory hallucinations still present, but less frequent   Risk Potential: Suicidal ideation - None  Homicidal ideation - None  Potential for aggression - No   Memory:  recent and remote memory grossly intact   Sensorium  person, place, and time/date      Consciousness:  alert and awake   Attention: attention span and concentration are age appropriate   Insight:  limited   Judgment: limited   Gait/Station: normal gait/station   Motor Activity: no abnormal movements       Counseling / Coordination of Care:  Patient's progress reviewed with nursing staff.  Medications, treatment progress and treatment plan reviewed with patient.  Supportive counseling provided to the patient.    This note was completed in part utilizing Dragon dictation Software. Grammatical, translation, syntax errors, random word insertions, spelling mistakes, and incomplete sentences may be an occasional consequence of this system secondary to software limitations with voice recognition, ambient noise, and hardware issues. If you have any questions or concerns about the content, text, or information contained within the body of this dictation, please contact the provider for clarification

## 2024-11-26 PROCEDURE — 99232 SBSQ HOSP IP/OBS MODERATE 35: CPT

## 2024-11-26 RX ADMIN — CARVEDILOL 6.25 MG: 6.25 TABLET, FILM COATED ORAL at 08:37

## 2024-11-26 RX ADMIN — FLUTICASONE FUROATE 1 PUFF: 100 POWDER RESPIRATORY (INHALATION) at 08:39

## 2024-11-26 RX ADMIN — Medication 15 ML: at 08:36

## 2024-11-26 RX ADMIN — ONDANSETRON 4 MG: 4 TABLET, ORALLY DISINTEGRATING ORAL at 21:26

## 2024-11-26 RX ADMIN — CLONAZEPAM 1 MG: 1 TABLET ORAL at 21:21

## 2024-11-26 RX ADMIN — TRAZODONE HYDROCHLORIDE 50 MG: 50 TABLET ORAL at 21:20

## 2024-11-26 RX ADMIN — MELATONIN TAB 3 MG 3 MG: 3 TAB at 21:20

## 2024-11-26 RX ADMIN — ATORVASTATIN CALCIUM 10 MG: 10 TABLET, FILM COATED ORAL at 08:37

## 2024-11-26 RX ADMIN — FAMOTIDINE 20 MG: 20 TABLET ORAL at 17:00

## 2024-11-26 RX ADMIN — FAMOTIDINE 20 MG: 20 TABLET ORAL at 08:37

## 2024-11-26 RX ADMIN — POLYETHYLENE GLYCOL 3350 17 G: 17 POWDER, FOR SOLUTION ORAL at 08:36

## 2024-11-26 RX ADMIN — ACETAMINOPHEN 650 MG: 325 TABLET ORAL at 21:26

## 2024-11-26 RX ADMIN — CYANOCOBALAMIN TAB 1000 MCG 1000 MCG: 1000 TAB at 08:37

## 2024-11-26 RX ADMIN — CLONAZEPAM 0.5 MG: 0.5 TABLET ORAL at 08:37

## 2024-11-26 RX ADMIN — PRAZOSIN HYDROCHLORIDE 5 MG: 5 CAPSULE ORAL at 21:21

## 2024-11-26 RX ADMIN — CLOZAPINE 900 MG: 200 TABLET ORAL at 21:20

## 2024-11-26 RX ADMIN — CARVEDILOL 6.25 MG: 6.25 TABLET, FILM COATED ORAL at 16:47

## 2024-11-26 RX ADMIN — VALPROIC ACID 750 MG: 500 SOLUTION ORAL at 21:21

## 2024-11-26 NOTE — PLAN OF CARE
Problem: Alteration in Thoughts and Perception  Goal: Treatment Goal: Gain control of psychotic behaviors/thinking, reduce/eliminate presenting symptoms and demonstrate improved reality functioning upon discharge  Outcome: Progressing  Goal: Verbalize thoughts and feelings  Description: Interventions:  - Promote a nonjudgmental and trusting relationship with the patient through active listening and therapeutic communication  - Assess patient's level of functioning, behavior and potential for risk  - Engage patient in 1 on 1 interactions  - Encourage patient to express fears, feelings, frustrations, and discuss symptoms    - [place] patient to reality, help patient recognize reality-based thinking   - Administer medications as ordered and assess for potential side effects  - Provide the patient education related to the signs and symptoms of the illness and desired effects of prescribed medications  Outcome: Progressing  Goal: Refrain from acting on delusional thinking/internal stimuli  Description: Interventions:  - Monitor patient closely, per order   - Utilize least restrictive measures   - Set reasonable limits, give positive feedback for acceptable   - Administer medications as ordered and monitor of potential side effects  Outcome: Progressing  Goal: Agree to be compliant with medication regime, as prescribed and report medication side effects  Description: Interventions:  - Offer appropriate PRN medication and supervise ingestion; conduct AIMS, as needed   Outcome: Progressing  Goal: Attend and participate in unit activities, including therapeutic, recreational, and educational groups  Description: Interventions:  -Encourage Visitation and family involvement in care  Outcome: Progressing  Goal: Recognize dysfunctional thoughts, communicate reality-based thoughts at the time of discharge  Description: Interventions:  - Provide medication and psycho-education to assist patient in compliance and developing  insight into his/her illness   Outcome: Progressing  Goal: Complete daily ADLs, including personal hygiene independently, as able  Description: Interventions:  - Observe, teach, and assist patient with ADLS  - Monitor and promote a balance of rest/activity, with adequate nutrition and elimination   Outcome: Progressing     Problem: Depression  Goal: Treatment Goal: Demonstrate behavioral control of depressive symptoms, verbalize feelings of improved mood/affect, and adopt new coping skills prior to discharge  Outcome: Progressing  Goal: Verbalize thoughts and feelings  Description: Interventions:  - Assess and re-assess patient's level of risk   - Engage patient in 1:1 interactions, daily, for a minimum of 15 minutes   - Encourage patient to express feelings, fears, frustrations, hopes   Outcome: Progressing  Goal: Refrain from isolation  Description: Interventions:  - Develop a trusting relationship   - Encourage socialization   Outcome: Progressing  Goal: Refrain from self-neglect  Outcome: Progressing  Goal: Attend and participate in unit activities, including therapeutic, recreational, and educational groups  Description: Interventions:  - Provide therapeutic and educational activities daily, encourage attendance and participation, and document same in the medical record   Outcome: Progressing     Problem: Anxiety  Goal: Anxiety is at manageable level  Description: Interventions:  - Assess and monitor patient's anxiety level.   - Monitor for signs and symptoms (heart palpitations, chest pain, shortness of breath, headaches, nausea, feeling jumpy, restlessness, irritable, apprehensive).   - Collaborate with interdisciplinary team and initiate plan and interventions as ordered.  - [place] patient to unit/surroundings  - Explain treatment plan  - Encourage participation in care  - Encourage verbalization of concerns/fears  - Identify coping mechanisms  - Assist in developing anxiety-reducing skills  -  Administer/offer alternative therapies  - Limit or eliminate stimulants  Outcome: Progressing

## 2024-11-26 NOTE — CASE MANAGEMENT
Cm met with pt's ICM worker Conchita Hall and [facility]  Marilu Rosales. CM reviewed pt's current status and improvement on unit. CM provided clinical to Marilu as requested.   Pt met with Conchita and Marilu; CRR interview was completed.   CM met with Marilu and Conchita following interview; they reported pt did well. Conchita reports finding pt to be at baseline level of functioning and denies concerns that pt will need CRR placement. Conchita reports pt was functioning well in community at current presentation. Conchita reports concern that pt would lose apt and cat if pt goes to CRR. Conchita requested to have meeting with psychiatrist or attend team rounds to discuss pt. CM informed Conchita of need to discuss these requests with treatment team. CM provided updated clinical to Conchita as requested.    CM later rec'd following email from Marilu Rosales at [facility]. Email was also sent to Conchita Hall and the following individuals:  Marion Garner [email or web address]  [email or web address]: [email or web address]>; [email or web address] <[email or web address]>    Good afternoon,    I interviewed Meli at [facility] today, along with her BCM Conchita Hall. Meli still has her apartment at [facility], where she wants to return. During the interview, she was assessed to not need help with daily living skills. She appears to have regained her baseline of mental stability, per Conchita's observation. After interviewing Meli, Conchita and I discussed the possibility of her returning to her apartment with support from [facility] and perhaps [facility] could come on board to help her regain her independence. Meli is agreeable to getting pre-packaged medication doses to assist her with compliance, which I believe is a major concern. I feel it may be more traumatizing to Meli to give up her  apartment AND her cat, to move to a CRR that she probably doesn't really even need at this point.     Unless there is something we are missing that we couldn't see today when we met with her. Tashia said she will address this with the doctor and treatment team. Let me know your thoughts or if you have any questions.    Thank you!       Kind regards,    RODGER Charles   (Intake Services)    [facility]  [address],  [address],  [place] [address]  Office Phone: [phone] x [phone]  Intake Fax: [phone]

## 2024-11-26 NOTE — NURSING NOTE
Pt is calm and cooperative with care . Pt is out for meals but isolative to self . Pt takes all medication as scheduled and has fair intake at meals time. Pt currently denies anxiety ,depression and all other s/s. Will maintain q 15 mins checks.

## 2024-11-26 NOTE — ASSESSMENT & PLAN NOTE
Continue Depakote 750mg qhs, for mood stabilization   Most recent VPA level 54 on 10/12/24  CMP on 10/12/24 reviewed, WNL  Clozaril 900 mg nightly for schizoaffective disorder  Clozaril monitoring:   CRP, CBC/diff, CK QMon for monitoring  ANC 11/25/2024-2.89  Vital Signs stable   Most recent ECG reviewed 9/9/24 - normal ECG, NSR  Clozaril increased to 900 mg nightly on 10/29/24 - Clozaril level 894 on 11/04/24   Risperdal discontinued l on 10/28/24 for moderate affectivity  Ativan changed to Klonopin 0.5 mg daily/ 1 mg qhs    Continue melatonin 3 mg nightly for insomnia  Continue prazosin 5 mg nightly for PTSD associated nightmares; doses to be adjusted as indicated   Continue trazodone 50 mg nightly for insomnia  Discharge disposition: LTSR

## 2024-11-26 NOTE — TREATMENT TEAM
11/26/24 0900   Team Meeting   Meeting Type Daily Rounds   Team Members Present   Team Members Present Physician;Nurse;;;Occupational Therapist   Physician Team Member Dr. Banuelos / Dr. Hurst / Dr. Bland / JAIR Ziegler   Nursing Team Member David   Care Management Team Member Manju / Tee   Social Work Team Member Anastacio   OT Team Member Eduardo   Patient/Family Present   Patient Present No   Patient's Family Present No     Pt is reported to have an interview with [facility] today at 1pm. Pt is reported to have eaten. Pt did not attend groups.

## 2024-11-26 NOTE — PROGRESS NOTES
Progress Note - Behavioral Health   Name: Meli Nowak 59 y.o. female I MRN: [id number]  Unit/Bed#: [facility] 651-02 I Date of Admission: 7/23/2024   Date of Service: 11/26/2024 I Hospital Day: 126     Assessment & Plan  Schizoaffective disorder, bipolar type (HCC)     Continue Depakote 750mg qhs, for mood stabilization   Most recent VPA level 54 on 10/12/24  CMP on 10/12/24 reviewed, WNL  Clozaril 900 mg nightly for schizoaffective disorder  Clozaril monitoring:   CRP, CBC/diff, CK QMon for monitoring  ANC 11/25/2024-2.89  Vital Signs stable   Most recent ECG reviewed 9/9/24 - normal ECG, NSR  Clozaril increased to 900 mg nightly on 10/29/24 - Clozaril level 894 on 11/04/24   Risperdal discontinued l on 10/28/24 for moderate affectivity  Ativan changed to Klonopin 0.5 mg daily/ 1 mg qhs    Continue melatonin 3 mg nightly for insomnia  Continue prazosin 5 mg nightly for PTSD associated nightmares; doses to be adjusted as indicated   Continue trazodone 50 mg nightly for insomnia  Discharge disposition: LTSR    ASHLIE (obstructive sleep apnea)  - f/u w/ Sleep Medicine in outpatient setting and consider CPAP as indicated      Plan   Progress Toward Goals:   Meli is progressing towards goals of inpatient psychiatric treatment by continued medication compliance and is attending therapeutic modalities on the milieu. However, the patient continues to require inpatient psychiatric hospitalization for continued medication management and titration to optimize symptom reduction, improve sleep hygiene, and demonstrate adequate self-care.    Recommended Treatment: Treatment plan and medication changes discussed and per the attending physician the plan is:    1.Continue with group therapy, milieu therapy and occupational therapy  2.Behavioral Health checks every 7 minutes  3.Continue frequent safety checks and vitals per unit protocol  4.Continue with SLIM medical management as indicated  5.Continue with current medication  regimen  6.Will review labs in the a.m.  7.Disposition Planning: Discharge planning and efforts remain ongoing    Behavioral Health Medications: all current active meds have been reviewed.  Current Facility-Administered Medications   Medication Dose Route Frequency Provider Last Rate    acetaminophen  650 mg Oral Q4H PRN Marie Ziegler, CRNP      acetaminophen  650 mg Oral Q4H PRN Marie Ziegler, CRNP      acetaminophen  975 mg Oral Q6H PRN Marie Ziegler, CRNP      albuterol  2 puff Inhalation Q4H PRN Kristen Logan, CRNP      aluminum-magnesium hydroxide-simethicone  30 mL Oral Q4H PRN Parris Bolanos, CRNP      Artificial Tears  1 drop Both Eyes Q6H PRN Dereje Banuelos MD      atorvastatin  10 mg Oral Daily Marie Ziegler, CRNP      bisacodyl  10 mg Oral Daily PRN Kristen Logan, CRNP      bisacodyl  10 mg Rectal Daily PRN Marie Ziegler, CRNP      carvedilol  6.25 mg Oral BID With Meals Marie Ziegler, CRNP      clonazePAM  0.5 mg Oral Daily Marie Ziegler, CRNP      clonazePAM  1 mg Oral HS Marie Ziegler, CRNP      cloZAPine  900 mg Oral HS Marie Ziegler, CRNP      cyanocobalamin  1,000 mcg Oral Daily Marie Ziegler, CRNP      Diclofenac Sodium  2 g Topical 4x Daily PRN Marie Ziegler, CRNP      famotidine  20 mg Oral BID Shan Fitzgerald, DO      fluticasone  1 puff Inhalation Daily Mraie Ziegler, CRNP      hydrOXYzine HCL  25 mg Oral Q6H PRN Max 4/day Marie Ziegler, CRNP      hydrOXYzine HCL  50 mg Oral Q6H PRN Max 4/day Marie Ziegler, CRNP      LORazepam  1 mg Intramuscular Q6H PRN Max 3/day Marie Ziegler, CRNP      LORazepam  1 mg Oral Q6H PRN Max 3/day Marie Ziegler, CRNP      melatonin  3 mg Oral HS Marie Ziegler, CRNP      mirtazapine  7.5 mg Oral HS PRN Marie Ziegler, CRNP      Multivitamin  15 mL Oral Daily Marie Ziegler, CRNP      OLANZapine  2.5 mg Oral Q6H PRN Dereje Banuelos MD      Or    OLANZapine  2.5 mg Intramuscular Q6H PRN Dereje Banuelos MD       OLANZapine  5 mg Oral Q6H PRN Dereje Banuelos MD      Or    OLANZapine  5 mg Intramuscular Q6H PRN Dereje Banuelos MD      ondansetron  4 mg Oral Q6H PRN Darin Lawton MD      polyethylene glycol  17 g Oral Daily Kristen Ludwig Doreen, JOSE ELIAS      polyethylene glycol  17 g Oral Daily PRN JOSE ELIAS Figueroa      prazosin  5 mg Oral HS Marie Ziegler, JOSE ELIAS      senna-docusate sodium  2 tablet Oral HS Rehana Borrego PA-C      traZODone  50 mg Oral HS Marie Ziegler, JOSE ELIAS      valproic acid  750 mg Oral HS JOSE ELIAS Figueroa         Risks / Benefits of Treatment:  Risks, benefits, and possible side effects of medications explained to patient and patient verbalizes understanding and agreement for treatment.       Subjective    Legal Status: 304  Patient was seen today for continuation of care, records reviewed and patient was discussed with the morning case review team.    Meli was seen today for psychiatric follow-up.  On assessment today, Meli was lying in bed. Continues to report daytime sedation despite good sleep pattern. Depakene dose decreased to 750 mg QHS 11/25/24 to improve sedation. Meli was goal directed during interview, asking  appropriate questions about today's meeting with [facility]BS and voicing concerns about PRN anxiety medication upon discharge. Expresses excitement over upcoming discharge and appear more linear in conversation today. Appetite unchanged. CBC, CK and CRP obtained 11/25/24 for continued Clozaril management. ANC 2.89. Remaining labs WNL. Meli denies acute suicidal/self-harm ideation/intent/plan upon direct inquiry at this time.  Meli remains behaviorally appropriate, no agitation or aggression noted on exam or in report.  Meli also denies HI/AH/VH, and does not appear overtly manic.   Impulse control is intact.  Meli remains adherent to her current psychotropic medication regimen and denies any side effects from medications, as well as none noted on exam.    Meli is currently  assessed as being at their baseline with continued need for medication management, supervision for safety and ADL’s. These services are not currently available in a less restrictive environment necessitating continued hospital stay.  [place] should remain on the unit until these services are available, due to likelihood of mental decompensation and readmission if discharged to an unsupervised setting.  Assertive discharge planning and collaboration with multiple providers (inpatient and community based) remains ongoing.  Meli is currently awaiting for LTSR. Est. Date of Discharge: 12/31     Psychiatric Review of Systems:  Behavior over the last 24 hours:  unchanged.   Sleep: good  Appetite: good  Medication side effects: none  ROS: no complaints, denies shortness of breath or chest pain and all other systems are negative for acute changes        Objective    Vitals:  Vitals:    11/26/24 0759   BP: 119/58   Pulse: 75   Resp: 18   Temp: 98.1 °F (36.7 °C)   SpO2: 93%       Laboratory Results:  I have personally reviewed all pertinent laboratory/tests results.    Mental Status Evaluation:  Appearance:  disheveled, marginal hygiene   Behavior:  pleasant, cooperative, calm   Speech:  normal rate and volume   Mood:  less anxious, less depressed   Affect:  constricted   Thought Process:  perseverative, concrete   Thought Content:  Voodoo preoccupation, paranoid ideation   Perceptual Disturbances: auditory hallucinations still present, but less frequent   Risk Potential: Suicidal ideation - None  Homicidal ideation - None  Potential for aggression - No   Memory:  recent and remote memory grossly intact   Sensorium  person, place, and time/date      Consciousness:  alert and awake   Attention: attention span and concentration are age appropriate   Insight:  limited   Judgment: limited   Gait/Station: normal gait/station   Motor Activity: no abnormal movements       Counseling / Coordination of Care:  Patient's progress  reviewed with nursing staff.  Medications, treatment progress and treatment plan reviewed with patient.  Supportive counseling provided to the patient.    This note was completed in part utilizing Dragon dictation Software. Grammatical, translation, syntax errors, random word insertions, spelling mistakes, and incomplete sentences may be an occasional consequence of this system secondary to software limitations with voice recognition, ambient noise, and hardware issues. If you have any questions or concerns about the content, text, or information contained within the body of this dictation, please contact the provider for clarification

## 2024-11-27 PROCEDURE — 99232 SBSQ HOSP IP/OBS MODERATE 35: CPT

## 2024-11-27 RX ADMIN — SENNOSIDES AND DOCUSATE SODIUM 2 TABLET: 8.6; 5 TABLET ORAL at 21:16

## 2024-11-27 RX ADMIN — CLONAZEPAM 0.5 MG: 0.5 TABLET ORAL at 08:37

## 2024-11-27 RX ADMIN — TRAZODONE HYDROCHLORIDE 50 MG: 50 TABLET ORAL at 21:17

## 2024-11-27 RX ADMIN — MELATONIN TAB 3 MG 3 MG: 3 TAB at 21:17

## 2024-11-27 RX ADMIN — ACETAMINOPHEN 975 MG: 325 TABLET, FILM COATED ORAL at 17:02

## 2024-11-27 RX ADMIN — CYANOCOBALAMIN TAB 1000 MCG 1000 MCG: 1000 TAB at 08:37

## 2024-11-27 RX ADMIN — VALPROIC ACID 750 MG: 500 SOLUTION ORAL at 21:17

## 2024-11-27 RX ADMIN — CARVEDILOL 6.25 MG: 6.25 TABLET, FILM COATED ORAL at 17:03

## 2024-11-27 RX ADMIN — ATORVASTATIN CALCIUM 10 MG: 10 TABLET, FILM COATED ORAL at 08:37

## 2024-11-27 RX ADMIN — FLUTICASONE FUROATE 1 PUFF: 100 POWDER RESPIRATORY (INHALATION) at 08:37

## 2024-11-27 RX ADMIN — Medication 15 ML: at 08:36

## 2024-11-27 RX ADMIN — CLONAZEPAM 1 MG: 1 TABLET ORAL at 21:16

## 2024-11-27 RX ADMIN — PRAZOSIN HYDROCHLORIDE 5 MG: 5 CAPSULE ORAL at 21:16

## 2024-11-27 RX ADMIN — CLOZAPINE 900 MG: 200 TABLET ORAL at 21:17

## 2024-11-27 RX ADMIN — FAMOTIDINE 20 MG: 20 TABLET ORAL at 08:37

## 2024-11-27 RX ADMIN — FAMOTIDINE 20 MG: 20 TABLET ORAL at 17:03

## 2024-11-27 NOTE — TREATMENT TEAM
11/26/24 2126   Pain Assessment   Pain Assessment Tool 0-10   Pain Score 6   Pain Location/Orientation Location: Generalized   Pain Onset/Description Onset: Ongoing   Hospital Pain Intervention(s) Medication (See MAR)     Medicated with Tylenol for c/o generalized pain.

## 2024-11-27 NOTE — TREATMENT TEAM
11/27/24 0800   Team Meeting   Meeting Type Daily Rounds   Team Members Present   Team Members Present Physician;Nurse;;;Occupational Therapist   Physician Team Member Dr. Banuelos / Dr. Hurst / Dr. Bland / JAIR Ziegler   Nursing Team Member David   Care Management Team Member Manju / Tee   Social Work Team Member Anastacio   Patient/Family Present   Patient Present No   Patient's Family Present No     Pt is reported to have not attended groups, Pt is reported to be somatic.

## 2024-11-27 NOTE — PLAN OF CARE
Problem: Prexisting or High Potential for Compromised Skin Integrity  Goal: Skin integrity is maintained or improved  Description: INTERVENTIONS:  - Identify patients at risk for skin breakdown  - Assess and monitor skin integrity  - Assess and monitor nutrition and hydration status  - Monitor labs   - Assess for incontinence   - Turn and reposition patient  - Assist with mobility/ambulation  - Relieve pressure over bony prominences  - Avoid friction and shearing  - Provide appropriate hygiene as needed including keeping skin clean and dry  - Evaluate need for skin moisturizer/barrier cream  - Collaborate with interdisciplinary team   - Patient/family teaching  - Consider wound care consult   Outcome: Progressing     Problem: Alteration in Thoughts and Perception  Goal: Agree to be compliant with medication regime, as prescribed and report medication side effects  Description: Interventions:  - Offer appropriate PRN medication and supervise ingestion; conduct AIMS, as needed   Outcome: Progressing  Goal: Complete daily ADLs, including personal hygiene independently, as able  Description: Interventions:  - Observe, teach, and assist patient with ADLS  - Monitor and promote a balance of rest/activity, with adequate nutrition and elimination   Outcome: Progressing     Problem: Ineffective Coping  Goal: Free from restraint events  Description: - Utilize least restrictive measures   - Provide behavioral interventions   - Redirect inappropriate behaviors   Outcome: Progressing     Problem: Risk for Self Injury/Neglect  Goal: Treatment Goal: Remain safe during length of stay, learn and adopt new coping skills, and be free of self-injurious ideation, impulses and acts at the time of discharge  Outcome: Progressing  Goal: Refrain from harming self  Description: Interventions:  - Monitor patient closely, per order  - Develop a trusting relationship  - Supervise medication ingestion, monitor effects and side effects    Outcome: Progressing     Problem: Depression  Goal: Refrain from self-neglect  Outcome: Progressing     Problem: Anxiety  Goal: Anxiety is at manageable level  Description: Interventions:  - Assess and monitor patient's anxiety level.   - Monitor for signs and symptoms (heart palpitations, chest pain, shortness of breath, headaches, nausea, feeling jumpy, restlessness, irritable, apprehensive).   - Collaborate with interdisciplinary team and initiate plan and interventions as ordered.  - [place] patient to unit/surroundings  - Explain treatment plan  - Encourage participation in care  - Encourage verbalization of concerns/fears  - Identify coping mechanisms  - Assist in developing anxiety-reducing skills  - Administer/offer alternative therapies  - Limit or eliminate stimulants  Outcome: Progressing     Problem: Potential for Falls  Goal: Patient will remain free of falls  Description: INTERVENTIONS:  - Educate patient on patient safety including physical limitations  - Instruct patient to call for assistance with activity   - Consult OT/PT to assist with strengthening/mobility   - Keep Call bell within reach  - Keep bed low and locked with side rails adjusted as appropriate  - Keep care items and personal belongings within reach  - Initiate and maintain comfort rounds  - Offer Toileting every 2 Hours, in advance of need  - Initiate/Maintain bed and chair alarm  - Obtain necessary fall risk management equipment: walker, wheelchair  - Apply yellow socks and bracelet for high fall risk patients  - Patient moved to room near nurses station  Outcome: Progressing

## 2024-11-27 NOTE — CASE MANAGEMENT
Following E-mail sent to Marilu Rosales ([facility]). Conchita Hall (ICM), Fransico Hernandez, Marion Garner, Kathy Chairez:    Marilu,  I reviewed your concerns with [place]'s provider and at this time there is strong concern that Meli will not comply with the Clozaril and lab work needed. We are concerned that she is not at a point to safely be in the community without appropriate supervision and support that a CRR would provide.   We also discussed community support; ICM is not enough support for Meli in the community. The option for ACT referral was also reviewed and [place]'s provider is concerned that ACT is still not enough support for Meli.   We can plan for a meeting next Wednesday. Please respond if anyone on the email would like to participate.    Thank you  Arlette

## 2024-11-27 NOTE — NURSING NOTE
"Tylenol 975 mg was not effective at the time of reassessment.  Pt stated, \"It's starting to kick in, but my legs still hurt.\"  "

## 2024-11-27 NOTE — NURSING NOTE
Pt blunted and withdrawn with poor intake.  BP 95/53, Coreg held at 0900.  C/O loose BM, Miralax held.  Did not attend group.  Monitored for safety and support.

## 2024-11-27 NOTE — NURSING NOTE
"Meli is isolative to her room. Meli does not interact with peers. Meli is somatic and c/o \"abdominal pain and nausea.\" Reports she had 2 BM's today. [place] also c/o generalized pain. Tylenol given with improvement. Meli denies all psych s/s. Meli is medication compliant but refused Senna as she reports multiple Bms today. Will monitor and support during treatment.  "

## 2024-11-27 NOTE — PROGRESS NOTES
Progress Note - Behavioral Health   Name: Meli Nowak 59 y.o. female I MRN: [id number]  Unit/Bed#: [facility] 651-02 I Date of Admission: 7/23/2024   Date of Service: 11/27/2024 I Hospital Day: 127     Assessment & Plan  Schizoaffective disorder, bipolar type (HCC)  Continue Depakote 750mg qhs, for mood stabilization   Most recent VPA level 54 on 10/12/24  CMP on 10/12/24 reviewed, WNL  Clozaril 900 mg nightly for schizoaffective disorder  Clozaril monitoring:   CRP, CBC/diff, CK QMon for monitoring  ANC 11/25/2024-2.89  Vital Signs stable   Most recent ECG reviewed 9/9/24 - normal ECG, NSR  Clozaril increased to 900 mg nightly on 10/29/24 - Clozaril level 894 on 11/04/24   Risperdal discontinued l on 10/28/24 for moderate affectivity  Ativan changed to Klonopin 0.5 mg daily/ 1 mg qhs    Continue melatonin 3 mg nightly for insomnia  Continue prazosin 5 mg nightly for PTSD associated nightmares; doses to be adjusted as indicated   Continue trazodone 50 mg nightly for insomnia  Discharge disposition: LTSR    ASHLIE (obstructive sleep apnea)  - f/u w/ Sleep Medicine in outpatient setting and consider CPAP as indicated      Plan   Progress Toward Goals:   Meli is progressing towards goals of inpatient psychiatric treatment by continued medication compliance and is attending therapeutic modalities on the milieu. However, the patient continues to require inpatient psychiatric hospitalization for continued medication management and titration to optimize symptom reduction, improve sleep hygiene, and demonstrate adequate self-care.    Recommended Treatment: Treatment plan and medication changes discussed and per the attending physician the plan is:    1.Continue with group therapy, milieu therapy and occupational therapy  2.Behavioral Health checks every 7 minutes  3.Continue frequent safety checks and vitals per unit protocol  4.Continue with SLIM medical management as indicated  5.Continue with current medication regimen  6.Will  review labs in the a.m.  7.Disposition Planning: Discharge planning and efforts remain ongoing    Behavioral Health Medications: all current active meds have been reviewed and continue current psychiatric medications.  Current Facility-Administered Medications   Medication Dose Route Frequency Provider Last Rate    acetaminophen  650 mg Oral Q4H PRN Marie Ziegler, CRNP      acetaminophen  650 mg Oral Q4H PRN Marie Ziegler, CRNP      acetaminophen  975 mg Oral Q6H PRN Marie Ziegler, CRNP      albuterol  2 puff Inhalation Q4H PRN Kristen Logan, CRNP      aluminum-magnesium hydroxide-simethicone  30 mL Oral Q4H PRN Parris Bolanos, CRNP      Artificial Tears  1 drop Both Eyes Q6H PRN Dereje Banuelos MD      atorvastatin  10 mg Oral Daily Marie Ziegler, CRNP      bisacodyl  10 mg Oral Daily PRN Kristen Logan, CRNP      bisacodyl  10 mg Rectal Daily PRN Marie Ziegler, CRNP      carvedilol  6.25 mg Oral BID With Meals Marie Ziegler, CRNP      clonazePAM  0.5 mg Oral Daily Marie Ziegler, CRNP      clonazePAM  1 mg Oral HS Marie Ziegler, CRNP      cloZAPine  900 mg Oral HS Marie Ziegler, CRNP      cyanocobalamin  1,000 mcg Oral Daily Marie Ziegler, CRNP      Diclofenac Sodium  2 g Topical 4x Daily PRN Marie Ziegler, CRNP      famotidine  20 mg Oral BID Shan Fitzgerald, DO      fluticasone  1 puff Inhalation Daily Marie Ziegler, CRNP      hydrOXYzine HCL  25 mg Oral Q6H PRN Max 4/day Marie Ziegler, CRNP      hydrOXYzine HCL  50 mg Oral Q6H PRN Max 4/day Marie Ziegler, CRNP      LORazepam  1 mg Intramuscular Q6H PRN Max 3/day Marie Ziegler, CRNP      LORazepam  1 mg Oral Q6H PRN Max 3/day Marie Ziegler, CRNP      melatonin  3 mg Oral HS Marie Ziegler, CRNP      mirtazapine  7.5 mg Oral HS PRN Marie Ziegler, CRNP      Multivitamin  15 mL Oral Daily Marie Ziegler, CRNP      OLANZapine  2.5 mg Oral Q6H PRN Dereje Banuelos MD      Or    OLANZapine  2.5 mg Intramuscular Q6H  PRN Dereje Banuelos MD      OLANZapine  5 mg Oral Q6H PRN Dereje Banuelos MD      Or    OLANZapine  5 mg Intramuscular Q6H PRN Dereje Banuelos MD      ondansetron  4 mg Oral Q6H PRN Darin Lawton MD      polyethylene glycol  17 g Oral Daily Kristen Ludwig Doreen, JOSE ELIAS      polyethylene glycol  17 g Oral Daily PRN JOSE ELIAS Figueroa      prazosin  5 mg Oral HS Marie Ziegler, JOSE ELIAS      senna-docusate sodium  2 tablet Oral HS Rehana Borrego PA-C      traZODone  50 mg Oral HS JOSE ELIAS Figueroa      valproic acid  750 mg Oral HS JOSE ELIAS Figueroa         Risks / Benefits of Treatment:  Risks, benefits, and possible side effects of medications explained to patient and patient verbalizes understanding and agreement for treatment.       Subjective    Patient was seen today for continuation of care, records reviewed and patient was discussed with the morning case review team.    Meli was seen today for psychiatric follow-up.  On assessment today, Meli was seen lying in bed.  Increasingly somatic today, patient reports multiple loose stools although none have been recorded by staff.  Encouraged to use the BRAT diet, patient is encouraged to be more active on the unit and attend groups.  Auditory hallucinations manageable, patient reports that she continues to use coping skills which are effective.  Sleep and appetite unchanged.  She is managing recent decrease of Depakote well.  Meli denies acute suicidal/self-harm ideation/intent/plan upon direct inquiry at this time.  Meli remains behaviorally appropriate, no agitation or aggression noted on exam or in report.   No overt delusions or paranoia are verbalized. Impulse control is intact.  Meli remains adherent to her current psychotropic medication regimen and denies any side effects from medications, as well as none noted on exam.    Meli is currently assessed as being at their baseline with continued need for medication management, supervision for safety  and ADL’s. These services are not currently available in a less restrictive environment necessitating continued hospital stay.  Meli should remain on the unit until these services are available, due to likelihood of mental decompensation and readmission if discharged to an unsupervised setting.  Assertive discharge planning and collaboration with multiple providers (inpatient and community based) remains ongoing.  Meli is currently awaiting for LTSR.     Psychiatric Review of Systems:  Behavior over the last 24 hours:  unchanged.   Sleep: good  Appetite: good  Medication side effects:   ROS: no complaints, denies shortness of breath or chest pain and all other systems are negative for acute changes        Objective    Vitals:  Vitals:    11/27/24 0841   BP: 95/53   Pulse: 88   Resp: 18   Temp: 97.8 °F (36.6 °C)   SpO2: 97%       Laboratory Results:  I have personally reviewed all pertinent laboratory/tests results.  Most Recent Labs:   Lab Results   Component Value Date    WBC 7.77 11/25/2024    RBC 4.22 11/25/2024    HGB 13.5 11/25/2024    HCT 42.0 11/25/2024     11/25/2024    RDW 13.4 11/25/2024    NEUTROABS 2.89 11/25/2024    SODIUM 138 10/12/2024    K 4.1 10/12/2024     10/12/2024    CO2 29 10/12/2024    BUN 18 10/12/2024    CREATININE 0.97 10/12/2024    GLUC 146 (H) 10/12/2024    GLUF 98 08/10/2024    CALCIUM 9.2 10/12/2024    AST 12 (L) 10/12/2024    ALT 12 10/12/2024    ALKPHOS 84 10/12/2024    TP 6.7 10/12/2024    ALB 3.9 10/12/2024    TBILI 0.26 10/12/2024    VALPROICTOT 54 10/12/2024    AMMONIA 32 07/03/2024    MWD4RMQRDUPT 2.000 07/24/2024    HGBA1C 6.4 (H) 05/03/2024     05/03/2024       Mental Status Evaluation:  Appearance:  disheveled, marginal hygiene   Behavior:  cooperative, calm, guarded   Speech:  normal rate and volume   Mood:  less anxious, less depressed   Affect:  constricted   Thought Process:  perseverative, concrete   Thought Content:  Lutheran preoccupation, paranoid  ideation   Perceptual Disturbances: auditory hallucinations still present, but less frequent   Risk Potential: Suicidal ideation - None  Homicidal ideation - None  Potential for aggression - No   Memory:  recent and remote memory grossly intact   Sensorium  person, place, and time/date      Consciousness:  alert and awake   Attention: attention span and concentration are age appropriate   Insight:  limited   Judgment: limited   Gait/Station: normal gait/station   Motor Activity: no abnormal movements       Counseling / Coordination of Care:  Patient's progress reviewed with nursing staff.  Medications, treatment progress and treatment plan reviewed with patient.  Supportive counseling provided to the patient.    This note was completed in part utilizing Dragon dictation Software. Grammatical, translation, syntax errors, random word insertions, spelling mistakes, and incomplete sentences may be an occasional consequence of this system secondary to software limitations with voice recognition, ambient noise, and hardware issues. If you have any questions or concerns about the content, text, or information contained within the body of this dictation, please contact the provider for clarification

## 2024-11-27 NOTE — NURSING NOTE
Pt withdrawn to room.  Pt laid in bed during this shift.  She refused dinner, but requested a cup of orange juice.  Pt did receive her meds with no objections.  Pt denied any psych symptoms at this time.  Will continue to monitor.

## 2024-11-27 NOTE — NURSING NOTE
"Tylenol 975 mg administered at 1702 for pain in bilateral legs and shoulders.  Pt rated her pain \"over a ten\".  Will re-assess for effectiveness.  "

## 2024-11-28 PROCEDURE — 99232 SBSQ HOSP IP/OBS MODERATE 35: CPT

## 2024-11-28 RX ADMIN — PRAZOSIN HYDROCHLORIDE 5 MG: 5 CAPSULE ORAL at 21:32

## 2024-11-28 RX ADMIN — CYANOCOBALAMIN TAB 1000 MCG 1000 MCG: 1000 TAB at 08:34

## 2024-11-28 RX ADMIN — MELATONIN TAB 3 MG 3 MG: 3 TAB at 21:33

## 2024-11-28 RX ADMIN — Medication 15 ML: at 08:33

## 2024-11-28 RX ADMIN — FAMOTIDINE 20 MG: 20 TABLET ORAL at 17:01

## 2024-11-28 RX ADMIN — CLOZAPINE 900 MG: 200 TABLET ORAL at 21:32

## 2024-11-28 RX ADMIN — CARVEDILOL 6.25 MG: 6.25 TABLET, FILM COATED ORAL at 08:34

## 2024-11-28 RX ADMIN — CLONAZEPAM 1 MG: 1 TABLET ORAL at 21:32

## 2024-11-28 RX ADMIN — VALPROIC ACID 750 MG: 500 SOLUTION ORAL at 21:32

## 2024-11-28 RX ADMIN — FLUTICASONE FUROATE 1 PUFF: 100 POWDER RESPIRATORY (INHALATION) at 08:36

## 2024-11-28 RX ADMIN — CLONAZEPAM 0.5 MG: 0.5 TABLET ORAL at 08:34

## 2024-11-28 RX ADMIN — ATORVASTATIN CALCIUM 10 MG: 10 TABLET, FILM COATED ORAL at 08:34

## 2024-11-28 RX ADMIN — FAMOTIDINE 20 MG: 20 TABLET ORAL at 08:34

## 2024-11-28 RX ADMIN — TRAZODONE HYDROCHLORIDE 50 MG: 50 TABLET ORAL at 21:32

## 2024-11-28 NOTE — PLAN OF CARE
Problem: Prexisting or High Potential for Compromised Skin Integrity  Goal: Skin integrity is maintained or improved  Description: INTERVENTIONS:  - Identify patients at risk for skin breakdown  - Assess and monitor skin integrity  - Assess and monitor nutrition and hydration status  - Monitor labs   - Assess for incontinence   - Turn and reposition patient  - Assist with mobility/ambulation  - Relieve pressure over bony prominences  - Avoid friction and shearing  - Provide appropriate hygiene as needed including keeping skin clean and dry  - Evaluate need for skin moisturizer/barrier cream  - Collaborate with interdisciplinary team   - Patient/family teaching  - Consider wound care consult   Outcome: Progressing     Problem: Ineffective Coping  Goal: Participates in unit activities  Description: Interventions:  - Provide therapeutic environment   - Provide required programming   - Redirect inappropriate behaviors   Outcome: Not Progressing  Goal: Understands least restrictive measures  Description: Interventions:  - Utilize least restrictive behavior  Outcome: Progressing     Problem: Risk for Self Injury/Neglect  Goal: Treatment Goal: Remain safe during length of stay, learn and adopt new coping skills, and be free of self-injurious ideation, impulses and acts at the time of discharge  Outcome: Progressing     Problem: Depression  Goal: Treatment Goal: Demonstrate behavioral control of depressive symptoms, verbalize feelings of improved mood/affect, and adopt new coping skills prior to discharge  Outcome: Progressing  Goal: Refrain from isolation  Description: Interventions:  - Develop a trusting relationship   - Encourage socialization   Outcome: Not Progressing  Goal: Refrain from self-neglect  Outcome: Progressing     Problem: Anxiety  Goal: Anxiety is at manageable level  Description: Interventions:  - Assess and monitor patient's anxiety level.   - Monitor for signs and symptoms (heart palpitations, chest  pain, shortness of breath, headaches, nausea, feeling jumpy, restlessness, irritable, apprehensive).   - Collaborate with interdisciplinary team and initiate plan and interventions as ordered.  - [place] patient to unit/surroundings  - Explain treatment plan  - Encourage participation in care  - Encourage verbalization of concerns/fears  - Identify coping mechanisms  - Assist in developing anxiety-reducing skills  - Administer/offer alternative therapies  - Limit or eliminate stimulants  Outcome: Progressing     Problem: Potential for Falls  Goal: Patient will remain free of falls  Description: INTERVENTIONS:  - Educate patient on patient safety including physical limitations  - Instruct patient to call for assistance with activity   - Consult OT/PT to assist with strengthening/mobility   - Keep Call bell within reach  - Keep bed low and locked with side rails adjusted as appropriate  - Keep care items and personal belongings within reach  - Initiate and maintain comfort rounds  - Offer Toileting every 2 Hours, in advance of need  - Initiate/Maintain bed and chair alarm  - Obtain necessary fall risk management equipment: walker, wheelchair  - Apply yellow socks and bracelet for high fall risk patients  - Patient moved to room near nurses station  Outcome: Progressing

## 2024-11-28 NOTE — ASSESSMENT & PLAN NOTE
Depakote 750mg qhs, for mood stabilization   Most recent VPA level 54 on 10/12/24  CMP on 10/12/24 reviewed, WNL  Clozaril 900 mg nightly for schizoaffective disorder  Clozaril monitoring:   CRP, CBC/diff, CK QMon for monitoring  ANC 11/25/2024-2.89  Vital Signs stable   Most recent ECG reviewed 9/9/24 - normal ECG, NSR  Clozaril increased to 900 mg nightly on 10/29/24 - Clozaril level 894 on 11/04/24   Risperdal discontinued l on 10/28/24 for moderate affectivity  Ativan changed to Klonopin 0.5 mg daily/ 1 mg qhs    Continue melatonin 3 mg nightly for insomnia  Continue prazosin 5 mg nightly for PTSD associated nightmares; doses to be adjusted as indicated   Continue trazodone 50 mg nightly for insomnia  Discharge disposition: LTSR

## 2024-11-28 NOTE — PROGRESS NOTES
Progress Note - Behavioral Health   Name: Meli Nowak 59 y.o. female I MRN: [id number]  Unit/Bed#: [facility] 651-02 I Date of Admission: 7/23/2024   Date of Service: 11/28/2024 I Hospital Day: 128     Assessment & Plan  Schizoaffective disorder, bipolar type (HCC)  Depakote 750mg qhs, for mood stabilization   Most recent VPA level 54 on 10/12/24  CMP on 10/12/24 reviewed, WNL  Clozaril 900 mg nightly for schizoaffective disorder  Clozaril monitoring:   CRP, CBC/diff, CK QMon for monitoring  ANC 11/25/2024-2.89  Vital Signs stable   Most recent ECG reviewed 9/9/24 - normal ECG, NSR  Clozaril increased to 900 mg nightly on 10/29/24 - Clozaril level 894 on 11/04/24   Risperdal discontinued l on 10/28/24 for moderate affectivity  Ativan changed to Klonopin 0.5 mg daily/ 1 mg qhs    Continue melatonin 3 mg nightly for insomnia  Continue prazosin 5 mg nightly for PTSD associated nightmares; doses to be adjusted as indicated   Continue trazodone 50 mg nightly for insomnia  Discharge disposition: LTSR    ASHLIE (obstructive sleep apnea)  - f/u w/ Sleep Medicine in outpatient setting and consider CPAP as indicated      Plan   Progress Toward Goals:   Meli is progressing towards goals of inpatient psychiatric treatment by continued medication compliance and is attending therapeutic modalities on the milieu. However, the patient continues to require inpatient psychiatric hospitalization for continued medication management and titration to optimize symptom reduction, improve sleep hygiene, and demonstrate adequate self-care.    Recommended Treatment: Treatment plan and medication changes discussed and per the attending physician the plan is:    1.Continue with group therapy, milieu therapy and occupational therapy  2.Behavioral Health checks every 7 minutes  3.Continue frequent safety checks and vitals per unit protocol  4.Continue with [facility] medical management as indicated  5.Continue with current medication regimen  6.Will review  labs in the a.m.  7.Disposition Planning: Discharge planning and efforts remain ongoing    Behavioral Health Medications: all current active meds have been reviewed and continue current psychiatric medications.  Current Facility-Administered Medications   Medication Dose Route Frequency Provider Last Rate    acetaminophen  650 mg Oral Q4H PRN Marie Ziegler, CRNP      acetaminophen  650 mg Oral Q4H PRN Marie Ziegler, CRNP      acetaminophen  975 mg Oral Q6H PRN Marie Ziegler, CRNP      albuterol  2 puff Inhalation Q4H PRN Kristen Logan, CRNP      aluminum-magnesium hydroxide-simethicone  30 mL Oral Q4H PRN Parris Bolanos, CRNP      Artificial Tears  1 drop Both Eyes Q6H PRN Dereje Banuelos MD      atorvastatin  10 mg Oral Daily Marie Ziegler, CRNP      bisacodyl  10 mg Oral Daily PRN Kristen Logan, CRNP      bisacodyl  10 mg Rectal Daily PRN Marie Ziegler, CRNP      carvedilol  6.25 mg Oral BID With Meals Marie Ziegler, CRNP      clonazePAM  0.5 mg Oral Daily Marie Ziegler, CRNP      clonazePAM  1 mg Oral HS Marie Ziegler, CRNP      cloZAPine  900 mg Oral HS Marie Ziegler, CRNP      cyanocobalamin  1,000 mcg Oral Daily Marie Ziegler, CRNP      Diclofenac Sodium  2 g Topical 4x Daily PRN Marie Ziegler, CRNP      famotidine  20 mg Oral BID Shan Fitzgerald, DO      fluticasone  1 puff Inhalation Daily Marie Ziegler, CRNP      hydrOXYzine HCL  25 mg Oral Q6H PRN Max 4/day Marie Ziegler, CRNP      hydrOXYzine HCL  50 mg Oral Q6H PRN Max 4/day Marie Ziegler, CRNP      LORazepam  1 mg Intramuscular Q6H PRN Max 3/day Marie Ziegler, CRNP      LORazepam  1 mg Oral Q6H PRN Max 3/day Marie Ziegler, CRNP      melatonin  3 mg Oral HS Marie Ziegler, CRNP      mirtazapine  7.5 mg Oral HS PRN Marie Ziegler, CRNP      Multivitamin  15 mL Oral Daily Marie Ziegler, CRNP      OLANZapine  2.5 mg Oral Q6H PRN Dereje Banuelos MD      Or    OLANZapine  2.5 mg Intramuscular Q6H PRN  Dereje Banuelos MD      OLANZapine  5 mg Oral Q6H PRN Dereje Banuelos MD      Or    OLANZapine  5 mg Intramuscular Q6H PRN Dereje Banuelos MD      ondansetron  4 mg Oral Q6H PRN Darin Lawton MD      polyethylene glycol  17 g Oral Daily Kristen Ludwig Doreen, JOSE ELIAS      polyethylene glycol  17 g Oral Daily PRN JOSE ELIAS Figueroa      prazosin  5 mg Oral HS Marie Ziegler, CRVALE      senna-docusate sodium  2 tablet Oral HS Rehana Borrego PA-C      traZODone  50 mg Oral HS Marie Ziegler, JOSE ELIAS      valproic acid  750 mg Oral HS Marie Ziegler, JOSE ELIAS         Risks / Benefits of Treatment:  Risks, benefits, and possible side effects of medications explained to patient and patient verbalizes understanding and agreement for treatment.       Subjective    Patient was seen today for continuation of care, records reviewed and patient was discussed with the morning case review team.    Meli was seen today for psychiatric follow-up.  On assessment today, Meli was pleasant and bright on approach.  Still expressing somatic symptoms including bilateral leg pain, patient also verbalizes nausea post meals.  Encouraged that medicine continues to follow.  Patient reports that depression and anxiety are intermittent and questioned current medication regimen.  Plan of care provided, patient also aware of slow off titration of Depakote due to daytime sedation.  Sleep and appetite unchanged.  Meli reports that voices are not as prominent today.Meli denies acute suicidal/self-harm ideation/intent/plan upon direct inquiry at this time.  Meli remains behaviorally appropriate, no agitation or aggression noted on exam or in report. Impulse control is intact.  Meli remains adherent to her current psychotropic medication regimen and denies any side effects from medications, as well as none noted on exam.    Meli is currently assessed as being at their baseline with continued need for medication management, supervision for safety and  ADL’s. These services are not currently available in a less restrictive environment necessitating continued hospital stay.  Meli should remain on the unit until these services are available, due to likelihood of mental decompensation and readmission if discharged to an unsupervised setting.  Assertive discharge planning and collaboration with multiple providers (inpatient and community based) remains ongoing.  Meli is currently awaiting for CRR.     Psychiatric Review of Systems:  Behavior over the last 24 hours:  unchanged.   Sleep: good  Appetite: good  Medication side effects: none  ROS: no complaints, denies shortness of breath or chest pain and all other systems are negative for acute changes        Objective    Vitals:  Vitals:    11/28/24 0742   BP: 111/63   Pulse: 83   Resp: 17   Temp: 98.6 °F (37 °C)   SpO2: 91%       Laboratory Results:  I have personally reviewed all pertinent laboratory/tests results.  Most Recent Labs:   Lab Results   Component Value Date    WBC 7.77 11/25/2024    RBC 4.22 11/25/2024    HGB 13.5 11/25/2024    HCT 42.0 11/25/2024     11/25/2024    RDW 13.4 11/25/2024    NEUTROABS 2.89 11/25/2024    SODIUM 138 10/12/2024    K 4.1 10/12/2024     10/12/2024    CO2 29 10/12/2024    BUN 18 10/12/2024    CREATININE 0.97 10/12/2024    GLUC 146 (H) 10/12/2024    GLUF 98 08/10/2024    CALCIUM 9.2 10/12/2024    AST 12 (L) 10/12/2024    ALT 12 10/12/2024    ALKPHOS 84 10/12/2024    TP 6.7 10/12/2024    ALB 3.9 10/12/2024    TBILI 0.26 10/12/2024    VALPROICTOT 54 10/12/2024    AMMONIA 32 07/03/2024    [id number] 2.000 07/24/2024    HGBA1C 6.4 (H) 05/03/2024     05/03/2024       Mental Status Evaluation:  Appearance:  disheveled, marginal hygiene   Behavior:  cooperative, bizarre, guarded   Speech:  normal rate and volume   Mood:  less anxious, less depressed   Affect:  constricted   Thought Process:  perseverative   Thought Content:  Yazdanism and somatic preoccupation,  paranoid ideation   Perceptual Disturbances: auditory hallucinations still present, but less frequent   Risk Potential: Suicidal ideation - None  Homicidal ideation - None  Potential for aggression - No   Memory:  recent and remote memory grossly intact   Sensorium  person, place, and time/date      Consciousness:  alert and awake   Attention: attention span and concentration are age appropriate   Insight:  limited   Judgment: limited   Gait/Station: normal gait/station   Motor Activity: no abnormal movements       Counseling / Coordination of Care:  Patient's progress reviewed with nursing staff.  Medications, treatment progress and treatment plan reviewed with patient.  Supportive counseling provided to the patient.    This note was completed in part utilizing Dragon dictation Software. Grammatical, translation, syntax errors, random word insertions, spelling mistakes, and incomplete sentences may be an occasional consequence of this system secondary to software limitations with voice recognition, ambient noise, and hardware issues. If you have any questions or concerns about the content, text, or information contained within the body of this dictation, please contact the provider for clarification

## 2024-11-28 NOTE — NURSING NOTE
Pt cooperative mainly withdrawn to room. Episodes of loose stools noted this shift, have been resolving slowly.  Appetite fair and medication compliant. Denies SI/HI. Q 15 minute checks maintained.

## 2024-11-29 PROCEDURE — 99232 SBSQ HOSP IP/OBS MODERATE 35: CPT

## 2024-11-29 RX ORDER — VALPROIC ACID 250 MG/5ML
500 SOLUTION ORAL
Status: DISCONTINUED | OUTPATIENT
Start: 2024-11-29 | End: 2024-12-06

## 2024-11-29 RX ADMIN — CARVEDILOL 6.25 MG: 6.25 TABLET, FILM COATED ORAL at 09:09

## 2024-11-29 RX ADMIN — CLOZAPINE 900 MG: 200 TABLET ORAL at 21:46

## 2024-11-29 RX ADMIN — FAMOTIDINE 20 MG: 20 TABLET ORAL at 09:09

## 2024-11-29 RX ADMIN — TRAZODONE HYDROCHLORIDE 50 MG: 50 TABLET ORAL at 21:46

## 2024-11-29 RX ADMIN — ALBUTEROL SULFATE 2 PUFF: 90 AEROSOL, METERED RESPIRATORY (INHALATION) at 09:19

## 2024-11-29 RX ADMIN — CLONAZEPAM 1 MG: 1 TABLET ORAL at 21:46

## 2024-11-29 RX ADMIN — VALPROIC ACID 500 MG: 500 SOLUTION ORAL at 21:45

## 2024-11-29 RX ADMIN — CARVEDILOL 6.25 MG: 6.25 TABLET, FILM COATED ORAL at 16:50

## 2024-11-29 RX ADMIN — CYANOCOBALAMIN TAB 1000 MCG 1000 MCG: 1000 TAB at 09:09

## 2024-11-29 RX ADMIN — CLONAZEPAM 0.5 MG: 0.5 TABLET ORAL at 09:09

## 2024-11-29 RX ADMIN — ATORVASTATIN CALCIUM 10 MG: 10 TABLET, FILM COATED ORAL at 09:09

## 2024-11-29 RX ADMIN — SENNOSIDES AND DOCUSATE SODIUM 2 TABLET: 8.6; 5 TABLET ORAL at 21:45

## 2024-11-29 RX ADMIN — Medication 15 ML: at 09:09

## 2024-11-29 RX ADMIN — PRAZOSIN HYDROCHLORIDE 5 MG: 5 CAPSULE ORAL at 21:46

## 2024-11-29 RX ADMIN — FAMOTIDINE 20 MG: 20 TABLET ORAL at 16:50

## 2024-11-29 RX ADMIN — MELATONIN TAB 3 MG 3 MG: 3 TAB at 21:46

## 2024-11-29 NOTE — ASSESSMENT & PLAN NOTE
Decrease Depakote to 500 mg nightly due to daytime sedation  Most recent VPA level 54 on 10/12/24  CMP on 10/12/24 reviewed, WNL  Clozaril 900 mg nightly for schizoaffective disorder  Clozaril monitoring:   CRP, CBC/diff, CK QMon for monitoring  ANC 11/25/2024-2.89   Most recent ECG reviewed 9/9/24 - normal ECG, NSR  Clozaril increased to 900 mg nightly on 10/29/24 - Clozaril level 894 on 11/04/24   Risperdal discontinued l on 10/28/24 for moderate affectivity  Ativan changed to Klonopin 0.5 mg daily/ 1 mg qhs    Continue melatonin 3 mg nightly for insomnia  Continue prazosin 5 mg nightly for PTSD associated nightmares; doses to be adjusted as indicated   Continue trazodone 50 mg nightly for insomnia  Discharge disposition: LTSR     accepted

## 2024-11-29 NOTE — PLAN OF CARE
Problem: DISCHARGE PLANNING - CARE MANAGEMENT  Goal: Discharge to post-acute care or home with appropriate resources  Description: INTERVENTIONS:  - Conduct assessment to determine patient/family and health care team treatment goals, and need for post-acute services based on payer coverage, community resources, and patient preferences, and barriers to discharge  - Address psychosocial, clinical, and financial barriers to discharge as identified in assessment in conjunction with the patient/family and health care team  - Arrange appropriate level of post-acute services according to patient’s   needs and preference and payer coverage in collaboration with the physician and health care team  - Communicate with and update the patient/family, physician, and health care team regarding progress on the discharge plan  - Arrange appropriate transportation to post-acute venues  Outcome: Progressing     Problem: Prexisting or High Potential for Compromised Skin Integrity  Goal: Skin integrity is maintained or improved  Description: INTERVENTIONS:  - Identify patients at risk for skin breakdown  - Assess and monitor skin integrity  - Assess and monitor nutrition and hydration status  - Monitor labs   - Assess for incontinence   - Turn and reposition patient  - Assist with mobility/ambulation  - Relieve pressure over bony prominences  - Avoid friction and shearing  - Provide appropriate hygiene as needed including keeping skin clean and dry  - Evaluate need for skin moisturizer/barrier cream  - Collaborate with interdisciplinary team   - Patient/family teaching  - Consider wound care consult   Outcome: Progressing     Problem: Alteration in Thoughts and Perception  Goal: Treatment Goal: Gain control of psychotic behaviors/thinking, reduce/eliminate presenting symptoms and demonstrate improved reality functioning upon discharge  Outcome: Progressing  Goal: Verbalize thoughts and feelings  Description: Interventions:  - Promote  a nonjudgmental and trusting relationship with the patient through active listening and therapeutic communication  - Assess patient's level of functioning, behavior and potential for risk  - Engage patient in 1 on 1 interactions  - Encourage patient to express fears, feelings, frustrations, and discuss symptoms    - [place] patient to reality, help patient recognize reality-based thinking   - Administer medications as ordered and assess for potential side effects  - Provide the patient education related to the signs and symptoms of the illness and desired effects of prescribed medications  Outcome: Progressing  Goal: Refrain from acting on delusional thinking/internal stimuli  Description: Interventions:  - Monitor patient closely, per order   - Utilize least restrictive measures   - Set reasonable limits, give positive feedback for acceptable   - Administer medications as ordered and monitor of potential side effects  Outcome: Progressing  Goal: Agree to be compliant with medication regime, as prescribed and report medication side effects  Description: Interventions:  - Offer appropriate PRN medication and supervise ingestion; conduct AIMS, as needed   Outcome: Progressing  Goal: Attend and participate in unit activities, including therapeutic, recreational, and educational groups  Description: Interventions:  -Encourage Visitation and family involvement in care  Outcome: Progressing  Goal: Recognize dysfunctional thoughts, communicate reality-based thoughts at the time of discharge  Description: Interventions:  - Provide medication and psycho-education to assist patient in compliance and developing insight into his/her illness   Outcome: Progressing  Goal: Complete daily ADLs, including personal hygiene independently, as able  Description: Interventions:  - Observe, teach, and assist patient with ADLS  - Monitor and promote a balance of rest/activity, with adequate nutrition and elimination   Outcome: Progressing      Problem: Ineffective Coping  Goal: Identifies ineffective coping skills  Outcome: Progressing  Goal: Demonstrates healthy coping skills  Outcome: Progressing  Goal: Participates in unit activities  Description: Interventions:  - Provide therapeutic environment   - Provide required programming   - Redirect inappropriate behaviors   Outcome: Progressing  Goal: Patient/Family participate in treatment and DC plans  Description: Interventions:  - Provide therapeutic environment  Outcome: Progressing  Goal: Patient/Family verbalizes awareness of resources  Outcome: Progressing  Goal: Understands least restrictive measures  Description: Interventions:  - Utilize least restrictive behavior  Outcome: Progressing  Goal: Free from restraint events  Description: - Utilize least restrictive measures   - Provide behavioral interventions   - Redirect inappropriate behaviors   Outcome: Progressing     Problem: Risk for Self Injury/Neglect  Goal: Treatment Goal: Remain safe during length of stay, learn and adopt new coping skills, and be free of self-injurious ideation, impulses and acts at the time of discharge  Outcome: Progressing  Goal: Verbalize thoughts and feelings  Description: Interventions:  - Assess and re-assess patient's lethality and potential for self-injury  - Engage patient in 1:1 interactions, daily, for a minimum of 15 minutes  - Encourage patient to express feelings, fears, frustrations, hopes  - Establish rapport/trust with patient   Outcome: Progressing  Goal: Refrain from harming self  Description: Interventions:  - Monitor patient closely, per order  - Develop a trusting relationship  - Supervise medication ingestion, monitor effects and side effects   Outcome: Progressing  Goal: Attend and participate in unit activities, including therapeutic, recreational, and educational groups  Description: Interventions:  - Provide therapeutic and educational activities daily, encourage attendance and participation,  and document same in the medical record  - Obtain collateral information, encourage visitation and family involvement in care   Outcome: Progressing  Goal: Recognize maladaptive responses and adopt new coping mechanisms  Outcome: Progressing     Problem: Depression  Goal: Treatment Goal: Demonstrate behavioral control of depressive symptoms, verbalize feelings of improved mood/affect, and adopt new coping skills prior to discharge  Outcome: Progressing  Goal: Verbalize thoughts and feelings  Description: Interventions:  - Assess and re-assess patient's level of risk   - Engage patient in 1:1 interactions, daily, for a minimum of 15 minutes   - Encourage patient to express feelings, fears, frustrations, hopes   Outcome: Progressing  Goal: Refrain from isolation  Description: Interventions:  - Develop a trusting relationship   - Encourage socialization   Outcome: Progressing  Goal: Refrain from self-neglect  Outcome: Progressing  Goal: Attend and participate in unit activities, including therapeutic, recreational, and educational groups  Description: Interventions:  - Provide therapeutic and educational activities daily, encourage attendance and participation, and document same in the medical record   Outcome: Progressing     Problem: Anxiety  Goal: Anxiety is at manageable level  Description: Interventions:  - Assess and monitor patient's anxiety level.   - Monitor for signs and symptoms (heart palpitations, chest pain, shortness of breath, headaches, nausea, feeling jumpy, restlessness, irritable, apprehensive).   - Collaborate with interdisciplinary team and initiate plan and interventions as ordered.  - [place] patient to unit/surroundings  - Explain treatment plan  - Encourage participation in care  - Encourage verbalization of concerns/fears  - Identify coping mechanisms  - Assist in developing anxiety-reducing skills  - Administer/offer alternative therapies  - Limit or eliminate stimulants  Outcome:  Progressing     Problem: SAFETY,RESTRAINT: NV/NON-SELF DESTRUCTIVE BEHAVIOR  Goal: Remains free of harm/injury (restraint for non violent/non self-detsructive behavior)  Description: INTERVENTIONS:  - Instruct patient/family regarding restraint use   - Assess and monitor physiologic and psychological status   - Provide interventions and comfort measures to meet assessed patient needs   - Identify and implement measures to help patient regain control  - Assess readiness for release of restraint   Outcome: Progressing  Goal: Returns to optimal restraint-free functioning  Description: INTERVENTIONS:  - Assess the patient's behavior and symptoms that indicate continued need for restraint  - Identify and implement measures to help patient regain control  - Assess readiness for release of restraint   Outcome: Progressing     Problem: Potential for Falls  Goal: Patient will remain free of falls  Description: INTERVENTIONS:  - Educate patient on patient safety including physical limitations  - Instruct patient to call for assistance with activity   - Consult OT/PT to assist with strengthening/mobility   - Keep Call bell within reach  - Keep bed low and locked with side rails adjusted as appropriate  - Keep care items and personal belongings within reach  - Initiate and maintain comfort rounds  - Offer Toileting every 2 Hours, in advance of need  - Initiate/Maintain bed and chair alarm  - Obtain necessary fall risk management equipment: walker, wheelchair  - Apply yellow socks and bracelet for high fall risk patients  - Patient moved to room near nurses station  Outcome: Progressing     Problem: Nutrition/Hydration-ADULT  Goal: Nutrient/Hydration intake appropriate for improving, restoring or maintaining nutritional needs  Description: Monitor and assess patient's nutrition/hydration status for malnutrition. Collaborate with interdisciplinary team and initiate plan and interventions as ordered.  Monitor patient's weight and  dietary intake as ordered or per policy. Utilize nutrition screening tool and intervene as necessary. Determine patient's food preferences and provide high-protein, high-caloric foods as appropriate.     INTERVENTIONS:  - Monitor oral intake, urinary output, labs, and treatment plans  - Assess nutrition and hydration status and recommend course of action  - Evaluate amount of meals eaten  - Assist patient with eating if necessary   - Allow adequate time for meals  - Recommend/ encourage appropriate diets, oral nutritional supplements, and vitamin/mineral supplements  - Order, calculate, and assess calorie counts as needed  - Recommend, monitor, and adjust tube feedings and TPN/PPN based on assessed needs  - Assess need for intravenous fluids  - Provide specific nutrition/hydration education as appropriate  - Include patient/family/caregiver in decisions related to nutrition  Outcome: Progressing

## 2024-11-29 NOTE — PROGRESS NOTES
Progress Note - Behavioral Health   Name: Meli Nowak 59 y.o. female I MRN: [id number]  Unit/Bed#: [facility] 651-02 I Date of Admission: 7/23/2024   Date of Service: 11/29/2024 I Hospital Day: 129     Assessment & Plan  Schizoaffective disorder, bipolar type (HCC)  Decrease Depakote to 500 mg nightly due to daytime sedation  Most recent VPA level 54 on 10/12/24  CMP on 10/12/24 reviewed, WNL  Clozaril 900 mg nightly for schizoaffective disorder  Clozaril monitoring:   CRP, CBC/diff, CK QMon for monitoring  ANC 11/25/2024-2.89   Most recent ECG reviewed 9/9/24 - normal ECG, NSR  Clozaril increased to 900 mg nightly on 10/29/24 - Clozaril level 894 on 11/04/24   Risperdal discontinued l on 10/28/24 for moderate affectivity  Ativan changed to Klonopin 0.5 mg daily/ 1 mg qhs    Continue melatonin 3 mg nightly for insomnia  Continue prazosin 5 mg nightly for PTSD associated nightmares; doses to be adjusted as indicated   Continue trazodone 50 mg nightly for insomnia  Discharge disposition: LTSR    ASHLIE (obstructive sleep apnea)  - f/u w/ Sleep Medicine in outpatient setting and consider CPAP as indicated      Plan   Progress Toward Goals:   Meli is progressing towards goals of inpatient psychiatric treatment by continued medication compliance and is attending therapeutic modalities on the milieu. However, the patient continues to require inpatient psychiatric hospitalization for continued medication management and titration to optimize symptom reduction, improve sleep hygiene, and demonstrate adequate self-care.    Recommended Treatment: Treatment plan and medication changes discussed and per the attending physician the plan is:    1.Continue with group therapy, milieu therapy and occupational therapy  2.Behavioral Health checks every 7 minutes  3.Continue frequent safety checks and vitals per unit protocol  4.Continue with SLIM medical management as indicated  5.Continue with current medication regimen  6.Will review labs  in the a.m.  7.Disposition Planning: Discharge planning and efforts remain ongoing    Behavioral Health Medications: all current active meds have been reviewed and continue current psychiatric medications.  Current Facility-Administered Medications   Medication Dose Route Frequency Provider Last Rate    acetaminophen  650 mg Oral Q4H PRN Marie Ziegler, CRNP      acetaminophen  650 mg Oral Q4H PRN Marie Ziegler, CRNP      acetaminophen  975 mg Oral Q6H PRN Marie Ziegler, CRNP      albuterol  2 puff Inhalation Q4H PRN Kristen Logan, CRNP      aluminum-magnesium hydroxide-simethicone  30 mL Oral Q4H PRN Parris Bolanos, CRNP      Artificial Tears  1 drop Both Eyes Q6H PRN Dereje Banuelos MD      atorvastatin  10 mg Oral Daily Marie Ziegler, CRNP      bisacodyl  10 mg Oral Daily PRN Kristen Logan, CRNP      bisacodyl  10 mg Rectal Daily PRN Marie Ziegler, CRNP      carvedilol  6.25 mg Oral BID With Meals Marie Ziegler, CRNP      clonazePAM  0.5 mg Oral Daily Marie Ziegler, CRNP      clonazePAM  1 mg Oral HS Marie Ziegler, CRNP      cloZAPine  900 mg Oral HS Marie Ziegler, CRNP      cyanocobalamin  1,000 mcg Oral Daily Marie Ziegler, CRNP      Diclofenac Sodium  2 g Topical 4x Daily PRN Marie Ziegler, CRNP      famotidine  20 mg Oral BID Shan Fitzgerald, DO      fluticasone  1 puff Inhalation Daily Marie Ziegler, CRNP      hydrOXYzine HCL  25 mg Oral Q6H PRN Max 4/day Marie Ziegler, CRNP      hydrOXYzine HCL  50 mg Oral Q6H PRN Max 4/day Marie Ziegler, CRNP      LORazepam  1 mg Intramuscular Q6H PRN Max 3/day Marie Ziegler, CRNP      LORazepam  1 mg Oral Q6H PRN Max 3/day Marie Ziegler, CRNP      melatonin  3 mg Oral HS Marie Ziegler, CRNP      mirtazapine  7.5 mg Oral HS PRN Marie Ziegler, CRNP      Multivitamin  15 mL Oral Daily Marie Ziegler, CRNP      OLANZapine  2.5 mg Oral Q6H PRN Dereje Banuelos MD      Or    OLANZapine  2.5 mg Intramuscular Q6H PRN Dereje  "MD Vin      OLANZapine  5 mg Oral Q6H PRN Dereje Banuelos MD      Or    OLANZapine  5 mg Intramuscular Q6H PRN Dereje Banuelos MD      ondansetron  4 mg Oral Q6H PRN Darin Lawton MD      polyethylene glycol  17 g Oral Daily Kristenabdoulaye TomasJOSE ELIAS Weller      polyethylene glycol  17 g Oral Daily PRN JOSE ELIAS Figueroa      prazosin  5 mg Oral HS Marie Ziegler, JOSE ELIAS      senna-docusate sodium  2 tablet Oral HS Rehana Borrego PA-C      traZODone  50 mg Oral HS Marie Ziegler, JOSE ELIAS      valproic acid  750 mg Oral HS JOSE ELIAS Figueroa         Risks / Benefits of Treatment:  Risks, benefits, and possible side effects of medications explained to patient and patient verbalizes understanding and agreement for treatment.       Subjective    Patient was seen today for continuation of care, records reviewed and patient was discussed with the morning case review team.    Meli was seen today for psychiatric follow-up.  On assessment today, Meli was seen lying in bed.  Brighter on approach this morning, patient is more goal directed and states \"I have been thinking about my spirituality, I think I might go back to the same [employer] \".  Somatic occupation also improved, patient reports that she is no longer sick and is feeling better.  Sleep and appetite well.  Patient continues to receive Clozaril 900 mg daily with ANC on 1125 of 2.89.  Bowel regimen also effective, patient reports LBM on 11/29/24.  We will continue with off titration of Depakote due to daytime sedation and reduce to 500 mg daily.  Meli denies acute suicidal/self-harm ideation/intent/plan upon direct inquiry at this time.  Meli remains behaviorally appropriate, no agitation or aggression noted on exam or in report.  Impulse control is intact.  Meli remains adherent to her current psychotropic medication regimen and denies any side effects from medications, as well as none noted on exam.    Meli is currently assessed as being at their baseline " with continued need for medication management, supervision for safety and ADL’s. These services are not currently available in a less restrictive environment necessitating continued hospital stay.  Meli should remain on the unit until these services are available, due to likelihood of mental decompensation and readmission if discharged to an unsupervised setting.  Assertive discharge planning and collaboration with multiple providers (inpatient and community based) remains ongoing.  Meli is currently awaiting for LTSR.     Psychiatric Review of Systems:  Behavior over the last 24 hours:  unchanged.   Sleep: good  Appetite: good  Medication side effects: none  ROS: no complaints, denies shortness of breath or chest pain and all other systems are negative for acute changes        Objective    Vitals:  Vitals:    11/29/24 0745   BP: 109/55   Pulse: 68   Resp: 16   Temp: (!) 97.3 °F (36.3 °C)   SpO2: 96%       Laboratory Results:  I have personally reviewed all pertinent laboratory/tests results.  Most Recent Labs:   Lab Results   Component Value Date    WBC 7.77 11/25/2024    RBC 4.22 11/25/2024    HGB 13.5 11/25/2024    HCT 42.0 11/25/2024     11/25/2024    RDW 13.4 11/25/2024    NEUTROABS 2.89 11/25/2024    SODIUM 138 10/12/2024    K 4.1 10/12/2024     10/12/2024    CO2 29 10/12/2024    BUN 18 10/12/2024    CREATININE 0.97 10/12/2024    GLUC 146 (H) 10/12/2024    GLUF 98 08/10/2024    CALCIUM 9.2 10/12/2024    AST 12 (L) 10/12/2024    ALT 12 10/12/2024    ALKPHOS 84 10/12/2024    TP 6.7 10/12/2024    ALB 3.9 10/12/2024    TBILI 0.26 10/12/2024    VALPROICTOT 54 10/12/2024    AMMONIA 32 07/03/2024    XFR0DHTHTEFA 2.000 07/24/2024    HGBA1C 6.4 (H) 05/03/2024     05/03/2024       Mental Status Evaluation:  Appearance:  disheveled, marginal hygiene   Behavior:  cooperative, calm, bizarre, guarded   Speech:  normal rate and volume   Mood:  less anxious, less depressed   Affect:  constricted, slightly  brighter   Thought Process:  perseverative, concrete   Thought Content:  Congregational and somatic preoccupation, paranoid ideation   Perceptual Disturbances: auditory hallucinations still present, but less frequent   Risk Potential: Suicidal ideation - None  Homicidal ideation - None  Potential for aggression - No   Memory:  recent and remote memory grossly intact   Sensorium  person, place, and time/date      Consciousness:  alert and awake   Attention: attention span and concentration are age appropriate   Insight:  limited   Judgment: limited   Gait/Station: normal gait/station   Motor Activity: no abnormal movements       Counseling / Coordination of Care:  Patient's progress reviewed with nursing staff.  Medications, treatment progress and treatment plan reviewed with patient.  Supportive counseling provided to the patient.    This note was completed in part utilizing Dragon dictation Software. Grammatical, translation, syntax errors, random word insertions, spelling mistakes, and incomplete sentences may be an occasional consequence of this system secondary to software limitations with voice recognition, ambient noise, and hardware issues. If you have any questions or concerns about the content, text, or information contained within the body of this dictation, please contact the provider for clarification

## 2024-11-29 NOTE — TREATMENT TEAM
11/29/24 0800   Team Meeting   Meeting Type Daily Rounds   Team Members Present   Team Members Present Physician;Nurse;;;Occupational Therapist   Physician Team Member Dr. Banuelos / Dr. Hurst / Dr. Bland / JAIR Ziegler   Nursing Team Member David   Care Management Team Member Manju / Tee   Social Work Team Member Anastacio   OT Team Member Eduardo   Patient/Family Present   Patient Present No   Patient's Family Present No     Pt is reported to be feeling Ill, poor intake, withdrawn to room. Pt is reported to have had BM incontinence and laid in it, did not notify staff and was unable to provide self care.

## 2024-11-29 NOTE — NURSING NOTE
Patient pleasant, calm and cooperative, able to make needs known. Is occasionally visible on the module, social with select peers, attends select groups. Denies SI/HI/AVH, anxiety or depression. Medication compliant. Will continue to monitor.

## 2024-11-30 PROCEDURE — 99232 SBSQ HOSP IP/OBS MODERATE 35: CPT | Performed by: NURSE PRACTITIONER

## 2024-11-30 RX ADMIN — CYANOCOBALAMIN TAB 1000 MCG 1000 MCG: 1000 TAB at 08:35

## 2024-11-30 RX ADMIN — PRAZOSIN HYDROCHLORIDE 5 MG: 5 CAPSULE ORAL at 21:37

## 2024-11-30 RX ADMIN — CARVEDILOL 6.25 MG: 6.25 TABLET, FILM COATED ORAL at 08:35

## 2024-11-30 RX ADMIN — SENNOSIDES AND DOCUSATE SODIUM 2 TABLET: 8.6; 5 TABLET ORAL at 21:38

## 2024-11-30 RX ADMIN — CLOZAPINE 900 MG: 200 TABLET ORAL at 21:37

## 2024-11-30 RX ADMIN — CLONAZEPAM 1 MG: 1 TABLET ORAL at 21:38

## 2024-11-30 RX ADMIN — FAMOTIDINE 20 MG: 20 TABLET ORAL at 17:36

## 2024-11-30 RX ADMIN — CARVEDILOL 6.25 MG: 6.25 TABLET, FILM COATED ORAL at 16:25

## 2024-11-30 RX ADMIN — VALPROIC ACID 500 MG: 500 SOLUTION ORAL at 21:39

## 2024-11-30 RX ADMIN — Medication 15 ML: at 08:36

## 2024-11-30 RX ADMIN — ACETAMINOPHEN 975 MG: 325 TABLET, FILM COATED ORAL at 09:24

## 2024-11-30 RX ADMIN — TRAZODONE HYDROCHLORIDE 50 MG: 50 TABLET ORAL at 21:38

## 2024-11-30 RX ADMIN — FAMOTIDINE 20 MG: 20 TABLET ORAL at 08:35

## 2024-11-30 RX ADMIN — CLONAZEPAM 0.5 MG: 0.5 TABLET ORAL at 08:35

## 2024-11-30 RX ADMIN — ATORVASTATIN CALCIUM 10 MG: 10 TABLET, FILM COATED ORAL at 08:35

## 2024-11-30 RX ADMIN — MELATONIN TAB 3 MG 3 MG: 3 TAB at 21:37

## 2024-11-30 NOTE — PROGRESS NOTES
"Progress Note - Behavioral Health   Meli ESPINOZA Nowak 59 y.o. female MRN: [id number]  Unit/Bed#: [facility] 651-02 Encounter: [id number]    Assessment & Plan  Schizoaffective disorder, bipolar type (HCC)  Continue Depakote 500 mg nightly (was decreased 11/29 due to daytime sedation)- will defer further titration to primary team on Monday  Most recent VPA level 54 on 10/12/24  CMP on 10/12/24 reviewed, WNL  Clozaril 900 mg nightly for schizoaffective disorder  Clozaril monitoring:   CRP, CBC/diff, CK QMon for monitoring  ANC 11/25/2024-2.89   Most recent ECG reviewed 9/9/24 - normal ECG, NSR  Clozaril increased to 900 mg nightly on 10/29/24 - Clozaril level 894 on 11/04/24   Risperdal discontinued l on 10/28/24 for moderate affectivity  Ativan changed to Klonopin 0.5 mg daily/ 1 mg qhs    Continue melatonin 3 mg nightly for insomnia  Continue prazosin 5 mg nightly for PTSD associated nightmares; doses to be adjusted as indicated   Continue trazodone 50 mg nightly for insomnia  Discharge disposition: LTSR      No associated orders from this encounter found during lookback period of 72 hours.    ASHLIE (obstructive sleep apnea)  - f/u w/ Sleep Medicine in outpatient setting and consider CPAP as indicated    No associated orders from this encounter found during lookback period of 72 hours.       Subjective:     Documentation, nursing notes, medication reconciliation, labs, and vitals reviewed. Patient was seen for continuing care and reviewed with treatment team.  No acute events over the past 24 hours. No medication changes over the past 24 hours.     Continues to tolerate current medications with no adverse effects.     On assessment the patient was resting in bed. On evaluation today the patient reports their mood as \" good \". Presently the patient rates their depression 0/10, 10 being the worst and their anxiety 0/10, 10 being the worst.  Denies depression and does not appear anxious.   No self-harming/suicidal ideation, plan, " or intent upon direct inquiry. No thoughts to harm others.  No agitation or aggression noted. Denies perceptual disturbances, with no delusional or paranoid content elicited. Does not appear overtly manic. Offers no further complaints.       Psychiatric ROS:  Behavior over the last 24 hours: unchanged  Sleep: normal  Appetite: normal  Medication side effects: No   ROS: no complaints      Mental Status Evaluation:    Appearance:  age appropriate   Behavior:  calm   Speech:  normal rate and volume   Mood:  euthymic   Affect:  normal range and intensity   Thought Process:  linear   Associations: intact associations   Thought Content:  no overt delusions   Perceptual Disturbances: no auditory hallucinations, no visual hallucinations   Risk Potential: Suicidal ideation - None at present  Homicidal ideation - None at present  Potential for aggression - No   Sensorium:  oriented to person, place, and time/date   Memory:  recent memory intact   Consciousness:  alert and awake   Attention/Concentration: attention span and concentration appear shorter than expected for age   Insight:  fair   Judgment: fair   Gait/Station: normal gait/station   Motor Activity: no abnormal movements       Vital signs in last 24 hours:    Temp:  [97 °F (36.1 °C)-97.6 °F (36.4 °C)] 97.4 °F (36.3 °C)  HR:  [66-72] 66  BP: (120-143)/(58-73) 120/58  Resp:  [17-18] 18  SpO2:  [95 %-98 %] 95 %  O2 Device: None (Room air)    Laboratory results: I have personally reviewed all pertinent laboratory/tests results    Results from the past 24 hours: No results found for this or any previous visit (from the past 24 hours).      Progress Toward Goals: progressing    Suicide/Homicide Risk Assessment:    Risk of Harm to Self:   Nursing Suicide Risk Assessment Last 24 hours: C-SSRS Risk (Since Last Contact)  Calculated C-SSRS Risk Score (Since Last Contact): No Risk Indicated  Current Specific Risk Factors include: mental illness diagnosis  Protective Factors: no  current suicidal plan or intent, ability to communicate with staff on the unit, able to contract for safety on the unit, taking medications as ordered on the unit  Based on today's assessment, Meli presents the following risk of harm to self: none    Risk of Harm to Others:  Nursing Homicide Risk Assessment: Violence Risk to Others: Denies within past 6 months  Current Specific Risk Factors include: none  Protective Factors: no current homicidal ideation  Based on today's assessment, [place] presents the following risk of harm to others: none    Risks / Benefits of Treatment:    Risks, benefits, and possible side effects of medications explained to patient and patient verbalizes understanding and agreement for treatment.    Counseling / Coordination of Care:    Total floor / unit time spent today 15 minutes. Greater than 50% of total time was spent with the patient and / or family counseling and / or coordination of care. A description of counseling / coordination of care:    Note Share    This note was not shared with the patient due to this is a psychotherapy note    JOSE ELIAS Olvera 11/30/24

## 2024-11-30 NOTE — PLAN OF CARE
Problem: Alteration in Thoughts and Perception  Goal: Treatment Goal: Gain control of psychotic behaviors/thinking, reduce/eliminate presenting symptoms and demonstrate improved reality functioning upon discharge  Outcome: Progressing  Goal: Verbalize thoughts and feelings  Description: Interventions:  - Promote a nonjudgmental and trusting relationship with the patient through active listening and therapeutic communication  - Assess patient's level of functioning, behavior and potential for risk  - Engage patient in 1 on 1 interactions  - Encourage patient to express fears, feelings, frustrations, and discuss symptoms    - [place] patient to reality, help patient recognize reality-based thinking   - Administer medications as ordered and assess for potential side effects  - Provide the patient education related to the signs and symptoms of the illness and desired effects of prescribed medications  Outcome: Progressing  Goal: Refrain from acting on delusional thinking/internal stimuli  Description: Interventions:  - Monitor patient closely, per order   - Utilize least restrictive measures   - Set reasonable limits, give positive feedback for acceptable   - Administer medications as ordered and monitor of potential side effects  Outcome: Progressing  Goal: Agree to be compliant with medication regime, as prescribed and report medication side effects  Description: Interventions:  - Offer appropriate PRN medication and supervise ingestion; conduct AIMS, as needed   Outcome: Progressing  Goal: Attend and participate in unit activities, including therapeutic, recreational, and educational groups  Description: Interventions:  -Encourage Visitation and family involvement in care  Outcome: Progressing  Goal: Recognize dysfunctional thoughts, communicate reality-based thoughts at the time of discharge  Description: Interventions:  - Provide medication and psycho-education to assist patient in compliance and developing  insight into his/her illness   Outcome: Progressing  Goal: Complete daily ADLs, including personal hygiene independently, as able  Description: Interventions:  - Observe, teach, and assist patient with ADLS  - Monitor and promote a balance of rest/activity, with adequate nutrition and elimination   Outcome: Progressing     Problem: Depression  Goal: Treatment Goal: Demonstrate behavioral control of depressive symptoms, verbalize feelings of improved mood/affect, and adopt new coping skills prior to discharge  Outcome: Progressing  Goal: Verbalize thoughts and feelings  Description: Interventions:  - Assess and re-assess patient's level of risk   - Engage patient in 1:1 interactions, daily, for a minimum of 15 minutes   - Encourage patient to express feelings, fears, frustrations, hopes   Outcome: Progressing  Goal: Refrain from isolation  Description: Interventions:  - Develop a trusting relationship   - Encourage socialization   Outcome: Progressing  Goal: Refrain from self-neglect  Outcome: Progressing  Goal: Attend and participate in unit activities, including therapeutic, recreational, and educational groups  Description: Interventions:  - Provide therapeutic and educational activities daily, encourage attendance and participation, and document same in the medical record   Outcome: Progressing     Problem: Potential for Falls  Goal: Patient will remain free of falls  Description: INTERVENTIONS:  - Educate patient on patient safety including physical limitations  - Instruct patient to call for assistance with activity   - Consult OT/PT to assist with strengthening/mobility   - Keep Call bell within reach  - Keep bed low and locked with side rails adjusted as appropriate  - Keep care items and personal belongings within reach  - Initiate and maintain comfort rounds  - Offer Toileting every 2 Hours, in advance of need  - Initiate/Maintain bed and chair alarm  - Obtain necessary fall risk management equipment:  walker, wheelchair  - Apply yellow socks and bracelet for high fall risk patients  - Patient moved to room near nurses station  Outcome: Progressing

## 2024-11-30 NOTE — NURSING NOTE
"Patient awake and alert. C/O 9/10 pain in \"shoulders, back, and everywhere.\" PRN Tylenol 975 mg PO given at 0924. Denies anxiety, depression, SI/HI/AVH, but states her pain \"Is making me upset.\" Compliant with AM PO medications. Declined Fluticasone this morning.   "

## 2024-11-30 NOTE — ASSESSMENT & PLAN NOTE
Continue Depakote 500 mg nightly (was decreased 11/29 due to daytime sedation)- will defer further titration to primary team on Monday  Most recent VPA level 54 on 10/12/24  CMP on 10/12/24 reviewed, WNL  Clozaril 900 mg nightly for schizoaffective disorder  Clozaril monitoring:   CRP, CBC/diff, CK QMon for monitoring  ANC 11/25/2024-2.89   Most recent ECG reviewed 9/9/24 - normal ECG, NSR  Clozaril increased to 900 mg nightly on 10/29/24 - Clozaril level 894 on 11/04/24   Risperdal discontinued l on 10/28/24 for moderate affectivity  Ativan changed to Klonopin 0.5 mg daily/ 1 mg qhs    Continue melatonin 3 mg nightly for insomnia  Continue prazosin 5 mg nightly for PTSD associated nightmares; doses to be adjusted as indicated   Continue trazodone 50 mg nightly for insomnia  Discharge disposition: LTSR      No associated orders from this encounter found during lookback period of 72 hours.

## 2024-12-01 PROCEDURE — 99232 SBSQ HOSP IP/OBS MODERATE 35: CPT | Performed by: NURSE PRACTITIONER

## 2024-12-01 RX ADMIN — SENNOSIDES AND DOCUSATE SODIUM 2 TABLET: 8.6; 5 TABLET ORAL at 21:23

## 2024-12-01 RX ADMIN — TRAZODONE HYDROCHLORIDE 50 MG: 50 TABLET ORAL at 21:23

## 2024-12-01 RX ADMIN — MELATONIN TAB 3 MG 3 MG: 3 TAB at 21:23

## 2024-12-01 RX ADMIN — FAMOTIDINE 20 MG: 20 TABLET ORAL at 08:50

## 2024-12-01 RX ADMIN — CLONAZEPAM 1 MG: 1 TABLET ORAL at 21:23

## 2024-12-01 RX ADMIN — CARVEDILOL 6.25 MG: 6.25 TABLET, FILM COATED ORAL at 17:00

## 2024-12-01 RX ADMIN — ACETAMINOPHEN 975 MG: 325 TABLET, FILM COATED ORAL at 09:25

## 2024-12-01 RX ADMIN — PRAZOSIN HYDROCHLORIDE 5 MG: 5 CAPSULE ORAL at 21:23

## 2024-12-01 RX ADMIN — ACETAMINOPHEN 975 MG: 325 TABLET, FILM COATED ORAL at 15:28

## 2024-12-01 RX ADMIN — VALPROIC ACID 500 MG: 500 SOLUTION ORAL at 21:23

## 2024-12-01 RX ADMIN — CYANOCOBALAMIN TAB 1000 MCG 1000 MCG: 1000 TAB at 08:50

## 2024-12-01 RX ADMIN — Medication 15 ML: at 08:50

## 2024-12-01 RX ADMIN — FAMOTIDINE 20 MG: 20 TABLET ORAL at 17:00

## 2024-12-01 RX ADMIN — FLUTICASONE FUROATE 1 PUFF: 100 POWDER RESPIRATORY (INHALATION) at 08:52

## 2024-12-01 RX ADMIN — ATORVASTATIN CALCIUM 10 MG: 10 TABLET, FILM COATED ORAL at 08:50

## 2024-12-01 RX ADMIN — CLOZAPINE 900 MG: 200 TABLET ORAL at 21:24

## 2024-12-01 RX ADMIN — CLONAZEPAM 0.5 MG: 0.5 TABLET ORAL at 08:50

## 2024-12-01 NOTE — PROGRESS NOTES
"Progress Note - Behavioral Health   Meli ESPINOZA Nowak 59 y.o. female MRN: [id number]  Unit/Bed#: [facility] 651-02 Encounter: [id number]    Assessment & Plan  Schizoaffective disorder, bipolar type (HCC)  No medication changes today  Continue Depakote 500 mg nightly (was decreased 11/29 due to daytime sedation)- will defer further titration to primary team on Monday  Most recent VPA level 54 on 10/12/24  CMP on 10/12/24 reviewed, WNL  Clozaril 900 mg nightly for schizoaffective disorder  Clozaril monitoring:   CRP, CBC/diff, CK QMon for monitoring  ANC 11/25/2024-2.89   Most recent ECG reviewed 9/9/24 - normal ECG, NSR  Clozaril increased to 900 mg nightly on 10/29/24 - Clozaril level 894 on 11/04/24   Risperdal discontinued l on 10/28/24 for moderate affectivity  Ativan changed to Klonopin 0.5 mg daily/ 1 mg qhs    Continue melatonin 3 mg nightly for insomnia  Continue prazosin 5 mg nightly for PTSD associated nightmares; doses to be adjusted as indicated   Continue trazodone 50 mg nightly for insomnia  Discharge disposition: LTSR      No associated orders from this encounter found during lookback period of 72 hours.    ASHLIE (obstructive sleep apnea)  - f/u w/ Sleep Medicine in outpatient setting and consider CPAP as indicated    No associated orders from this encounter found during lookback period of 72 hours.       Subjective:     Documentation, nursing notes, medication reconciliation, labs, and vitals reviewed. Patient was seen for continuing care and reviewed with treatment team.  No acute events over the past 24 hours. No medication changes over the past 24 hours.     Continues to tolerate current medications with no adverse effects.     On evaluation today the patient reports their mood as \" bad \". Presently the patient rates their depression 0/10, 10 being the worst and their anxiety 7/10, 10 being the worst. The patient endorses pain in her arms and legs, she reports the pain is alleviated by her PRN tylenol.   No " "self-harming/suicidal ideation, plan, or intent upon direct inquiry. No thoughts to harm others.  No agitation or aggression noted. Denies perceptual disturbances, with no delusional or paranoid content elicited. Does not appear overtly manic. Offers no further complaints.       Psychiatric ROS:  Behavior over the last 24 hours: unchanged  Sleep: normal  Appetite: normal  Medication side effects: No   ROS: no complaints      Mental Status Evaluation:    Appearance:  disheveled, looks older than stated age   Behavior:  guarded   Speech:  normal rate and volume   Mood:  \"Bad\"   Affect:  mood-congruent   Thought Process:  linear   Associations: intact associations   Thought Content:  no overt delusions   Perceptual Disturbances: no auditory hallucinations, no visual hallucinations   Risk Potential: Suicidal ideation - None at present  Homicidal ideation - None at present  Potential for aggression - No   Sensorium:  oriented to person, place, and time/date   Memory:  recent memory intact   Consciousness:  alert and awake   Attention/Concentration: attention span and concentration appear shorter than expected for age   Insight:  age appropriate   Judgment: age appropriate   Gait/Station: normal gait/station   Motor Activity: no abnormal movements       Vital signs in last 24 hours:    Temp:  [97.6 °F (36.4 °C)-98 °F (36.7 °C)] 97.6 °F (36.4 °C)  HR:  [75-83] 75  BP: (102-131)/(52-63) 102/52  Resp:  [18-19] 18  SpO2:  [94 %-95 %] 94 %  O2 Device: None (Room air)    Laboratory results: I have personally reviewed all pertinent laboratory/tests results    Results from the past 24 hours: No results found for this or any previous visit (from the past 24 hours).      Progress Toward Goals: progressing    Suicide/Homicide Risk Assessment:    Risk of Harm to Self:   Nursing Suicide Risk Assessment Last 24 hours: C-SSRS Risk (Since Last Contact)  Calculated C-SSRS Risk Score (Since Last Contact): No Risk Indicated  Current " Specific Risk Factors include: mental illness diagnosis  Protective Factors: no current suicidal plan or intent, ability to communicate with staff on the unit, able to contract for safety on the unit, taking medications as ordered on the unit  Based on today's assessment, [place] presents the following risk of harm to self: none    Risk of Harm to Others:  Nursing Homicide Risk Assessment: Violence Risk to Others: Denies within past 6 months  Current Specific Risk Factors include: none  Protective Factors: no current homicidal ideation  Based on today's assessment, [place] presents the following risk of harm to others: none    Risks / Benefits of Treatment:    Risks, benefits, and possible side effects of medications explained to patient and patient verbalizes understanding and agreement for treatment.    Counseling / Coordination of Care:    Total floor / unit time spent today 15 minutes. Greater than 50% of total time was spent with the patient and / or family counseling and / or coordination of care. A description of counseling / coordination of care:    Note Share    This note was not shared with the patient due to this is a psychotherapy note    JOSE ELIAS Olvera 12/01/24

## 2024-12-01 NOTE — ASSESSMENT & PLAN NOTE
No medication changes today  Continue Depakote 500 mg nightly (was decreased 11/29 due to daytime sedation)- will defer further titration to primary team on Monday  Most recent VPA level 54 on 10/12/24  CMP on 10/12/24 reviewed, WNL  Clozaril 900 mg nightly for schizoaffective disorder  Clozaril monitoring:   CRP, CBC/diff, CK QMon for monitoring  ANC 11/25/2024-2.89   Most recent ECG reviewed 9/9/24 - normal ECG, NSR  Clozaril increased to 900 mg nightly on 10/29/24 - Clozaril level 894 on 11/04/24   Risperdal discontinued l on 10/28/24 for moderate affectivity  Ativan changed to Klonopin 0.5 mg daily/ 1 mg qhs    Continue melatonin 3 mg nightly for insomnia  Continue prazosin 5 mg nightly for PTSD associated nightmares; doses to be adjusted as indicated   Continue trazodone 50 mg nightly for insomnia  Discharge disposition: LTSR      No associated orders from this encounter found during lookback period of 72 hours.

## 2024-12-01 NOTE — PLAN OF CARE
Problem: Alteration in Thoughts and Perception  Goal: Treatment Goal: Gain control of psychotic behaviors/thinking, reduce/eliminate presenting symptoms and demonstrate improved reality functioning upon discharge  Outcome: Progressing  Goal: Verbalize thoughts and feelings  Description: Interventions:  - Promote a nonjudgmental and trusting relationship with the patient through active listening and therapeutic communication  - Assess patient's level of functioning, behavior and potential for risk  - Engage patient in 1 on 1 interactions  - Encourage patient to express fears, feelings, frustrations, and discuss symptoms    - [place] patient to reality, help patient recognize reality-based thinking   - Administer medications as ordered and assess for potential side effects  - Provide the patient education related to the signs and symptoms of the illness and desired effects of prescribed medications  Outcome: Progressing  Goal: Refrain from acting on delusional thinking/internal stimuli  Description: Interventions:  - Monitor patient closely, per order   - Utilize least restrictive measures   - Set reasonable limits, give positive feedback for acceptable   - Administer medications as ordered and monitor of potential side effects  Outcome: Progressing  Goal: Agree to be compliant with medication regime, as prescribed and report medication side effects  Description: Interventions:  - Offer appropriate PRN medication and supervise ingestion; conduct AIMS, as needed   Outcome: Progressing  Goal: Attend and participate in unit activities, including therapeutic, recreational, and educational groups  Description: Interventions:  -Encourage Visitation and family involvement in care  Outcome: Progressing  Goal: Recognize dysfunctional thoughts, communicate reality-based thoughts at the time of discharge  Description: Interventions:  - Provide medication and psycho-education to assist patient in compliance and developing  insight into his/her illness   Outcome: Progressing  Goal: Complete daily ADLs, including personal hygiene independently, as able  Description: Interventions:  - Observe, teach, and assist patient with ADLS  - Monitor and promote a balance of rest/activity, with adequate nutrition and elimination   Outcome: Progressing     Problem: Risk for Self Injury/Neglect  Goal: Treatment Goal: Remain safe during length of stay, learn and adopt new coping skills, and be free of self-injurious ideation, impulses and acts at the time of discharge  Outcome: Progressing  Goal: Verbalize thoughts and feelings  Description: Interventions:  - Assess and re-assess patient's lethality and potential for self-injury  - Engage patient in 1:1 interactions, daily, for a minimum of 15 minutes  - Encourage patient to express feelings, fears, frustrations, hopes  - Establish rapport/trust with patient   Outcome: Progressing  Goal: Refrain from harming self  Description: Interventions:  - Monitor patient closely, per order  - Develop a trusting relationship  - Supervise medication ingestion, monitor effects and side effects   Outcome: Progressing  Goal: Attend and participate in unit activities, including therapeutic, recreational, and educational groups  Description: Interventions:  - Provide therapeutic and educational activities daily, encourage attendance and participation, and document same in the medical record  - Obtain collateral information, encourage visitation and family involvement in care   Outcome: Progressing  Goal: Recognize maladaptive responses and adopt new coping mechanisms  Outcome: Progressing     Problem: Depression  Goal: Treatment Goal: Demonstrate behavioral control of depressive symptoms, verbalize feelings of improved mood/affect, and adopt new coping skills prior to discharge  Outcome: Progressing  Goal: Verbalize thoughts and feelings  Description: Interventions:  - Assess and re-assess patient's level of risk   -  Engage patient in 1:1 interactions, daily, for a minimum of 15 minutes   - Encourage patient to express feelings, fears, frustrations, hopes   Outcome: Progressing  Goal: Refrain from isolation  Description: Interventions:  - Develop a trusting relationship   - Encourage socialization   Outcome: Progressing  Goal: Refrain from self-neglect  Outcome: Progressing  Goal: Attend and participate in unit activities, including therapeutic, recreational, and educational groups  Description: Interventions:  - Provide therapeutic and educational activities daily, encourage attendance and participation, and document same in the medical record   Outcome: Progressing     Problem: Anxiety  Goal: Anxiety is at manageable level  Description: Interventions:  - Assess and monitor patient's anxiety level.   - Monitor for signs and symptoms (heart palpitations, chest pain, shortness of breath, headaches, nausea, feeling jumpy, restlessness, irritable, apprehensive).   - Collaborate with interdisciplinary team and initiate plan and interventions as ordered.  - [place] patient to unit/surroundings  - Explain treatment plan  - Encourage participation in care  - Encourage verbalization of concerns/fears  - Identify coping mechanisms  - Assist in developing anxiety-reducing skills  - Administer/offer alternative therapies  - Limit or eliminate stimulants  Outcome: Progressing     Problem: Potential for Falls  Goal: Patient will remain free of falls  Description: INTERVENTIONS:  - Educate patient on patient safety including physical limitations  - Instruct patient to call for assistance with activity   - Consult OT/PT to assist with strengthening/mobility   - Keep Call bell within reach  - Keep bed low and locked with side rails adjusted as appropriate  - Keep care items and personal belongings within reach  - Initiate and maintain comfort rounds  - Offer Toileting every 2 Hours, in advance of need  - Initiate/Maintain bed and chair alarm  -  Obtain necessary fall risk management equipment: walker, wheelchair  - Apply yellow socks and bracelet for high fall risk patients  - Patient moved to room near nurses station  Outcome: Progressing

## 2024-12-01 NOTE — NURSING NOTE
"Patient awake and alert in bed this morning. Stated, \"I missed breakfast. I was too tired.\" C/O 9/10 pain in bilateral shoulders and back. PRN Tylenol 975 mg given at 0925 was effective per patient. Denies anxiety, depression, SI/HI/AVH to this RN. Withdrawn and stays in room between meals. Compliant with medications and care. No unmet needs at this time.  "

## 2024-12-02 LAB
BASOPHILS # BLD AUTO: 0.06 THOUSANDS/ΜL (ref 0–0.1)
BASOPHILS NFR BLD AUTO: 1 % (ref 0–1)
CK SERPL-CCNC: 26 U/L (ref 26–192)
CRP SERPL QL: 3 MG/L
EOSINOPHIL # BLD AUTO: 1.16 THOUSAND/ΜL (ref 0–0.61)
EOSINOPHIL NFR BLD AUTO: 16 % (ref 0–6)
ERYTHROCYTE [DISTWIDTH] IN BLOOD BY AUTOMATED COUNT: 13.5 % (ref 11.6–15.1)
HCT VFR BLD AUTO: 43.4 % (ref 34.8–46.1)
HGB BLD-MCNC: 14 G/DL (ref 11.5–15.4)
IMM GRANULOCYTES # BLD AUTO: 0.02 THOUSAND/UL (ref 0–0.2)
IMM GRANULOCYTES NFR BLD AUTO: 0 % (ref 0–2)
LYMPHOCYTES # BLD AUTO: 4.15 THOUSANDS/ΜL (ref 0.6–4.47)
LYMPHOCYTES NFR BLD AUTO: 55 % (ref 14–44)
MCH RBC QN AUTO: 31.6 PG (ref 26.8–34.3)
MCHC RBC AUTO-ENTMCNC: 32.3 G/DL (ref 31.4–37.4)
MCV RBC AUTO: 98 FL (ref 82–98)
MONOCYTES # BLD AUTO: 0.71 THOUSAND/ΜL (ref 0.17–1.22)
MONOCYTES NFR BLD AUTO: 10 % (ref 4–12)
NEUTROPHILS # BLD AUTO: 1.35 THOUSANDS/ΜL (ref 1.85–7.62)
NEUTS SEG NFR BLD AUTO: 18 % (ref 43–75)
NRBC BLD AUTO-RTO: 0 /100 WBCS
PLATELET # BLD AUTO: 172 THOUSANDS/UL (ref 149–390)
PMV BLD AUTO: 11.7 FL (ref 8.9–12.7)
RBC # BLD AUTO: 4.43 MILLION/UL (ref 3.81–5.12)
WBC # BLD AUTO: 7.45 THOUSAND/UL (ref 4.31–10.16)

## 2024-12-02 PROCEDURE — 99232 SBSQ HOSP IP/OBS MODERATE 35: CPT | Performed by: STUDENT IN AN ORGANIZED HEALTH CARE EDUCATION/TRAINING PROGRAM

## 2024-12-02 PROCEDURE — 85025 COMPLETE CBC W/AUTO DIFF WBC: CPT

## 2024-12-02 PROCEDURE — 82550 ASSAY OF CK (CPK): CPT

## 2024-12-02 PROCEDURE — 86140 C-REACTIVE PROTEIN: CPT

## 2024-12-02 RX ADMIN — FAMOTIDINE 20 MG: 20 TABLET ORAL at 08:58

## 2024-12-02 RX ADMIN — MELATONIN TAB 3 MG 3 MG: 3 TAB at 21:13

## 2024-12-02 RX ADMIN — CLONAZEPAM 1 MG: 1 TABLET ORAL at 21:13

## 2024-12-02 RX ADMIN — CARVEDILOL 6.25 MG: 6.25 TABLET, FILM COATED ORAL at 16:15

## 2024-12-02 RX ADMIN — CARVEDILOL 6.25 MG: 6.25 TABLET, FILM COATED ORAL at 08:15

## 2024-12-02 RX ADMIN — TRAZODONE HYDROCHLORIDE 50 MG: 50 TABLET ORAL at 21:13

## 2024-12-02 RX ADMIN — CLOZAPINE 900 MG: 200 TABLET ORAL at 21:13

## 2024-12-02 RX ADMIN — CLONAZEPAM 0.5 MG: 0.5 TABLET ORAL at 08:58

## 2024-12-02 RX ADMIN — FAMOTIDINE 20 MG: 20 TABLET ORAL at 17:10

## 2024-12-02 RX ADMIN — PRAZOSIN HYDROCHLORIDE 5 MG: 5 CAPSULE ORAL at 21:13

## 2024-12-02 RX ADMIN — VALPROIC ACID 500 MG: 500 SOLUTION ORAL at 21:13

## 2024-12-02 RX ADMIN — ATORVASTATIN CALCIUM 10 MG: 10 TABLET, FILM COATED ORAL at 08:58

## 2024-12-02 RX ADMIN — SENNOSIDES AND DOCUSATE SODIUM 2 TABLET: 8.6; 5 TABLET ORAL at 21:13

## 2024-12-02 RX ADMIN — HYDROXYZINE HYDROCHLORIDE 50 MG: 50 TABLET, FILM COATED ORAL at 18:50

## 2024-12-02 RX ADMIN — Medication 15 ML: at 08:59

## 2024-12-02 RX ADMIN — FLUTICASONE FUROATE 1 PUFF: 100 POWDER RESPIRATORY (INHALATION) at 09:01

## 2024-12-02 RX ADMIN — CYANOCOBALAMIN TAB 1000 MCG 1000 MCG: 1000 TAB at 08:58

## 2024-12-02 NOTE — PROGRESS NOTES
Progress Note - Behavioral Health     Meli ESPINOZA Nowak 59 y.o. female MRN: [id number]   Unit/Bed#: [facility] 651-02 Encounter: [id number]  Assessment & Plan  Schizoaffective disorder, bipolar type (HCC)  No medication changes today  Continue Depakote 500 mg nightly (was decreased 11/29 due to daytime sedation)- will defer further titration to primary team on Monday  Most recent VPA level 54 on 10/12/24  CMP on 10/12/24 reviewed, WNL  Clozaril 900 mg nightly for schizoaffective disorder  Clozaril monitoring:   CRP, CBC/diff, CK QMon for monitoring  ANC 11/25/2024-2.89   Most recent ECG reviewed 9/9/24 - normal ECG, NSR  Clozaril increased to 900 mg nightly on 10/29/24 - Clozaril level 894 on 11/04/24   Risperdal discontinued on 10/28/24 for moderate affectivity  Ativan changed to Klonopin 0.5 mg daily/ 1 mg qhs    Continue melatonin 3 mg nightly for insomnia  Continue prazosin 5 mg nightly for PTSD associated nightmares; doses to be adjusted as indicated   Continue trazodone 50 mg nightly for insomnia  Discharge disposition: LTSR      No associated orders from this encounter found during lookback period of 72 hours.    ASHLIE (obstructive sleep apnea)  - f/u w/ Sleep Medicine in outpatient setting and consider CPAP as indicated    No associated orders from this encounter found during lookback period of 72 hours.       Recommended Treatment:     Planned medication and treatment changes:    All current active medications have been reviewed  Encourage group therapy, milieu therapy and occupational therapy  Behavioral Health checks for safety monitoring      Current medications:  Current Facility-Administered Medications   Medication Dose Route Frequency Provider Last Rate    acetaminophen  650 mg Oral Q4H PRN JOSE ELIAS Figueroa      acetaminophen  650 mg Oral Q4H PRN JOS EELIAS Figueroa      acetaminophen  975 mg Oral Q6H PRN JOSE ELIAS Figueroa      albuterol  2 puff Inhalation Q4H PRN JOSE ELIAS Ortega       aluminum-magnesium hydroxide-simethicone  30 mL Oral Q4H PRN Parris Bolanos, CRNP      Artificial Tears  1 drop Both Eyes Q6H PRN Dereje Banuelos MD      atorvastatin  10 mg Oral Daily Marie Ziegler, CRNP      bisacodyl  10 mg Oral Daily PRN Kristen Logan, CRNP      bisacodyl  10 mg Rectal Daily PRN Marie Ziegler, CRNP      carvedilol  6.25 mg Oral BID With Meals Marie Ziegler, CRNP      clonazePAM  0.5 mg Oral Daily Marie Ziegler, CRNP      clonazePAM  1 mg Oral HS Marie Ziegler, CRNP      cloZAPine  900 mg Oral HS Marie Ziegler, CRNP      cyanocobalamin  1,000 mcg Oral Daily Marie Ziegler, CRNP      Diclofenac Sodium  2 g Topical 4x Daily PRN Marie Ziegler, CRNP      famotidine  20 mg Oral BID Shan Fitzgerald, DO      fluticasone  1 puff Inhalation Daily Marie Ziegler, CRNP      hydrOXYzine HCL  25 mg Oral Q6H PRN Max 4/day Marie Ziegler, CRNP      hydrOXYzine HCL  50 mg Oral Q6H PRN Max 4/day Marie Ziegler, CRNP      LORazepam  1 mg Intramuscular Q6H PRN Max 3/day Marie Ziegler, CRNP      LORazepam  1 mg Oral Q6H PRN Max 3/day Marie Ziegler, CRNP      melatonin  3 mg Oral HS Marie Ziegler, CRNP      mirtazapine  7.5 mg Oral HS PRN Marie Ziegler, CRNP      Multivitamin  15 mL Oral Daily Marie Ziegler, CRNP      OLANZapine  2.5 mg Oral Q6H PRN Dereje Banuelos MD      Or    OLANZapine  2.5 mg Intramuscular Q6H PRN Dereje Banuelos MD      OLANZapine  5 mg Oral Q6H PRN Dereje Banuelos MD      Or    OLANZapine  5 mg Intramuscular Q6H PRN Dereje Banuelos MD      ondansetron  4 mg Oral Q6H PRN Darni Lawton MD      polyethylene glycol  17 g Oral Daily Kristen Logan, CRNP      polyethylene glycol  17 g Oral Daily PRN Marie Ziegler, CRNP      prazosin  5 mg Oral HS Marie Ziegler, CRNP      senna-docusate sodium  2 tablet Oral HS Rehana Borrego PA-C      traZODone  50 mg Oral HS Marie Ziegler, CRNP      valproic acid  500 mg Oral HS JOSE ELIAS Figueroa    "      Risks / Benefits of Treatment:    Risks, benefits, and possible side effects of medications explained to patient and patient verbalizes understanding and agreement for treatment.    Subjective:    Behavior over the last 24 hours: unchanged.     Meli was seen today in follow-up for continuation of care. Per staff, for the most part has been cooperative and complaint.  She was seen today at bedside. Meli states that she is feeling \"good but tired.\" She reports overall and gradual improvement in her anxiety symptoms, \"they haven't been around as much.\"  Earlier in the week eluded to feeling unwell physically however notes this has resolved. She denies any complaints or side effects with her medications. States she is going to try to walk the unit today for exercise.Denies any AVH. Meli adamantly denies suicidal/homicidal ideation in addition to thoughts of self-injury. Meli presently is contacting for safety on the unit and feels comfortable to confide in staff if thoughts arise. At time of interview, no overt psychosis elicited. Meli has been complaint with medications and tolerating without any side effects.     Sleep: normal  Appetite: normal  Medication side effects: No   ROS: no complaints    Mental Status Evaluation:    Appearance:  disheveled, marginal hygiene, in hospital attire   Behavior:  cooperative, calm, more engaged   Speech:  normal rate, normal volume, normal pitch   Mood:  euthymic   Affect:  constricted   Thought Process:  linear   Associations: concrete associations   Thought Content:  no overt delusions   Perceptual Disturbances: no auditory hallucinations, no visual hallucinations, does not appear responding to internal stimuli   Risk Potential: Suicidal ideation - None at present  Homicidal ideation - None at present  Potential for aggression - No   Sensorium:  oriented to person, place, and time/date   Memory:  recent and remote memory grossly intact   Consciousness:  alert and awake "   Attention/Concentration: attention span and concentration are age appropriate   Insight:  limited   Judgment: limited   Gait/Station: in bed   Motor Activity: no abnormal movements     Vital signs in last 24 hours:    Temp:  [96.5 °F (35.8 °C)-97.8 °F (36.6 °C)] 97.8 °F (36.6 °C)  HR:  [70-73] 70  BP: (125-136)/(61-64) 136/64  Resp:  [16-18] 17  SpO2:  [93 %-98 %] 96 %  O2 Device: None (Room air)    Laboratory results: I have personally reviewed all pertinent laboratory/tests results    Results from the past 24 hours:   Recent Results (from the past 24 hours)   CBC and differential    Collection Time: 12/02/24  4:55 AM   Result Value Ref Range    WBC 7.45 4.31 - 10.16 Thousand/uL    RBC 4.43 3.81 - 5.12 Million/uL    Hemoglobin 14.0 11.5 - 15.4 g/dL    Hematocrit 43.4 34.8 - 46.1 %    MCV 98 82 - 98 fL    MCH 31.6 26.8 - 34.3 pg    MCHC 32.3 31.4 - 37.4 g/dL    RDW 13.5 11.6 - 15.1 %    MPV 11.7 8.9 - 12.7 fL    Platelets 172 149 - 390 Thousands/uL    nRBC 0 /100 WBCs    Segmented % 18 (L) 43 - 75 %    Immature Grans % 0 0 - 2 %    Lymphocytes % 55 (H) 14 - 44 %    Monocytes % 10 4 - 12 %    Eosinophils Relative 16 (H) 0 - 6 %    Basophils Relative 1 0 - 1 %    Absolute Neutrophils 1.35 (L) 1.85 - 7.62 Thousands/µL    Absolute Immature Grans 0.02 0.00 - 0.20 Thousand/uL    Absolute Lymphocytes 4.15 0.60 - 4.47 Thousands/µL    Absolute Monocytes 0.71 0.17 - 1.22 Thousand/µL    Eosinophils Absolute 1.16 (H) 0.00 - 0.61 Thousand/µL    Basophils Absolute 0.06 0.00 - 0.10 Thousands/µL   C-reactive protein    Collection Time: 12/02/24  4:55 AM   Result Value Ref Range    CRP 3.0 (H) <3.0 mg/L   CK    Collection Time: 12/02/24  4:55 AM   Result Value Ref Range    Total CK 26 26 - 192 U/L       Suicide/Homicide Risk Assessment:    Risk of Harm to Self:   Nursing Suicide Risk Assessment Last 24 hours: C-SSRS Risk (Since Last Contact)  Calculated C-SSRS Risk Score (Since Last Contact): No Risk Indicated    Risk of Harm to  Others:  Nursing Homicide Risk Assessment: Violence Risk to Others: Denies within past 6 months    The following interventions are recommended: Behavioral Health checks for safety monitoring, continued hospitalization on locked unit    Progress Toward Goals: progressing    Counseling / Coordination of Care:    Total floor / unit time spent today 38 minutes. Greater than 50% of total time was spent with the patient and / or family counseling and / or coordination of care. A description of counseling / coordination of care:    Rehana Borrego PA-C 12/02/24

## 2024-12-02 NOTE — NURSING NOTE
Pt awake and alert and visible for dinner. She consumed 50 % of dinner. Took her medications without incidence. Denies all psychiatric symptoms. Pt is polite and cooperative. No unmet needs at this time. Withdrawn. No behavioral issues.

## 2024-12-02 NOTE — ASSESSMENT & PLAN NOTE
No medication changes today  Continue Depakote 500 mg nightly (was decreased 11/29 due to daytime sedation)- will defer further titration to primary team on Monday  Most recent VPA level 54 on 10/12/24  CMP on 10/12/24 reviewed, WNL  Clozaril 900 mg nightly for schizoaffective disorder  Clozaril monitoring:   CRP, CBC/diff, CK QMon for monitoring  ANC 11/25/2024-2.89   Most recent ECG reviewed 9/9/24 - normal ECG, NSR  Clozaril increased to 900 mg nightly on 10/29/24 - Clozaril level 894 on 11/04/24   Risperdal discontinued on 10/28/24 for moderate affectivity  Ativan changed to Klonopin 0.5 mg daily/ 1 mg qhs    Continue melatonin 3 mg nightly for insomnia  Continue prazosin 5 mg nightly for PTSD associated nightmares; doses to be adjusted as indicated   Continue trazodone 50 mg nightly for insomnia  Discharge disposition: LTSR      No associated orders from this encounter found during lookback period of 72 hours.

## 2024-12-02 NOTE — PLAN OF CARE
Problem: Alteration in Thoughts and Perception  Goal: Treatment Goal: Gain control of psychotic behaviors/thinking, reduce/eliminate presenting symptoms and demonstrate improved reality functioning upon discharge  Outcome: Progressing  Goal: Verbalize thoughts and feelings  Description: Interventions:  - Promote a nonjudgmental and trusting relationship with the patient through active listening and therapeutic communication  - Assess patient's level of functioning, behavior and potential for risk  - Engage patient in 1 on 1 interactions  - Encourage patient to express fears, feelings, frustrations, and discuss symptoms    - Brogan patient to reality, help patient recognize reality-based thinking   - Administer medications as ordered and assess for potential side effects  - Provide the patient education related to the signs and symptoms of the illness and desired effects of prescribed medications  Outcome: Progressing  Goal: Refrain from acting on delusional thinking/internal stimuli  Description: Interventions:  - Monitor patient closely, per order   - Utilize least restrictive measures   - Set reasonable limits, give positive feedback for acceptable   - Administer medications as ordered and monitor of potential side effects  Outcome: Progressing  Goal: Agree to be compliant with medication regime, as prescribed and report medication side effects  Description: Interventions:  - Offer appropriate PRN medication and supervise ingestion; conduct AIMS, as needed   Outcome: Progressing  Goal: Attend and participate in unit activities, including therapeutic, recreational, and educational groups  Description: Interventions:  -Encourage Visitation and family involvement in care  Outcome: Progressing  Goal: Recognize dysfunctional thoughts, communicate reality-based thoughts at the time of discharge  Description: Interventions:  - Provide medication and psycho-education to assist patient in compliance and developing  insight into his/her illness   Outcome: Progressing  Goal: Complete daily ADLs, including personal hygiene independently, as able  Description: Interventions:  - Observe, teach, and assist patient with ADLS  - Monitor and promote a balance of rest/activity, with adequate nutrition and elimination   Outcome: Progressing     Problem: Depression  Goal: Treatment Goal: Demonstrate behavioral control of depressive symptoms, verbalize feelings of improved mood/affect, and adopt new coping skills prior to discharge  Outcome: Progressing  Goal: Verbalize thoughts and feelings  Description: Interventions:  - Assess and re-assess patient's level of risk   - Engage patient in 1:1 interactions, daily, for a minimum of 15 minutes   - Encourage patient to express feelings, fears, frustrations, hopes   Outcome: Progressing  Goal: Refrain from isolation  Description: Interventions:  - Develop a trusting relationship   - Encourage socialization   Outcome: Progressing  Goal: Refrain from self-neglect  Outcome: Progressing  Goal: Attend and participate in unit activities, including therapeutic, recreational, and educational groups  Description: Interventions:  - Provide therapeutic and educational activities daily, encourage attendance and participation, and document same in the medical record   Outcome: Progressing     Problem: Potential for Falls  Goal: Patient will remain free of falls  Description: INTERVENTIONS:  - Educate patient on patient safety including physical limitations  - Instruct patient to call for assistance with activity   - Consult OT/PT to assist with strengthening/mobility   - Keep Call bell within reach  - Keep bed low and locked with side rails adjusted as appropriate  - Keep care items and personal belongings within reach  - Initiate and maintain comfort rounds  - Offer Toileting every 2 Hours, in advance of need  - Initiate/Maintain bed and chair alarm  - Obtain necessary fall risk management equipment:  walker, wheelchair  - Apply yellow socks and bracelet for high fall risk patients  - Patient moved to room near nurses station  Outcome: Progressing

## 2024-12-02 NOTE — NURSING NOTE
"Patient AA&O. Denies physical pain. Denies anxiety, depression, SI/HI/AVH. Patient states, \"I feel great. I'm just tired. I want to sleep a little more.\" Pleasant and cooperative with medications and care. No unmet needs at this time.  "

## 2024-12-02 NOTE — NURSING NOTE
"Patient C/O anxiety, requesting \"Ativan or something for anxiety.\" PRN Atarax 50 mg given at 1850 for Powell score of 25. Will pass on to oncoming RN in report and reassess per unit protocol.  "

## 2024-12-02 NOTE — TREATMENT TEAM
12/02/24 0800   Team Meeting   Meeting Type Daily Rounds   Team Members Present   Team Members Present Physician;Nurse;;;Occupational Therapist   Physician Team Member Dr. Banuelos / Dr. Hurst / Dr. Bland / JAIR Ziegler   Nursing Team Member Dona   Care Management Team Member Manju / Tee   Social Work Team Member Anastacio   OT Team Member Eduardo   Patient/Family Present   Patient Present No   Patient's Family Present No     Pt is reported to be withdrawn to room. Pt is reported to deny signs and symptoms. Pt is reported to be sleeping, taking medication, and did well over the weekend.

## 2024-12-03 PROCEDURE — 99232 SBSQ HOSP IP/OBS MODERATE 35: CPT | Performed by: STUDENT IN AN ORGANIZED HEALTH CARE EDUCATION/TRAINING PROGRAM

## 2024-12-03 RX ADMIN — CLONAZEPAM 1 MG: 1 TABLET ORAL at 21:43

## 2024-12-03 RX ADMIN — CLOZAPINE 900 MG: 200 TABLET ORAL at 21:43

## 2024-12-03 RX ADMIN — ATORVASTATIN CALCIUM 10 MG: 10 TABLET, FILM COATED ORAL at 09:04

## 2024-12-03 RX ADMIN — CARVEDILOL 6.25 MG: 6.25 TABLET, FILM COATED ORAL at 09:04

## 2024-12-03 RX ADMIN — VALPROIC ACID 500 MG: 500 SOLUTION ORAL at 21:43

## 2024-12-03 RX ADMIN — FAMOTIDINE 20 MG: 20 TABLET ORAL at 17:26

## 2024-12-03 RX ADMIN — TRAZODONE HYDROCHLORIDE 50 MG: 50 TABLET ORAL at 21:43

## 2024-12-03 RX ADMIN — SENNOSIDES AND DOCUSATE SODIUM 2 TABLET: 8.6; 5 TABLET ORAL at 21:44

## 2024-12-03 RX ADMIN — FAMOTIDINE 20 MG: 20 TABLET ORAL at 09:05

## 2024-12-03 RX ADMIN — ACETAMINOPHEN 975 MG: 325 TABLET, FILM COATED ORAL at 13:39

## 2024-12-03 RX ADMIN — MELATONIN TAB 3 MG 3 MG: 3 TAB at 21:44

## 2024-12-03 RX ADMIN — FLUTICASONE FUROATE 1 PUFF: 100 POWDER RESPIRATORY (INHALATION) at 09:30

## 2024-12-03 RX ADMIN — Medication 15 ML: at 09:04

## 2024-12-03 RX ADMIN — PRAZOSIN HYDROCHLORIDE 5 MG: 5 CAPSULE ORAL at 21:44

## 2024-12-03 RX ADMIN — CYANOCOBALAMIN TAB 1000 MCG 1000 MCG: 1000 TAB at 09:04

## 2024-12-03 RX ADMIN — CLONAZEPAM 0.5 MG: 0.5 TABLET ORAL at 09:04

## 2024-12-03 NOTE — ASSESSMENT & PLAN NOTE
No medication changes today  Continue Depakote 500 mg nightly-11/29  Most recent VPA level 54 on 10/12/24  CMP on 10/12/24 reviewed, WNL  Clozaril 900 mg nightly for schizoaffective disorder  Clozaril monitoring:   CRP, CBC/diff, CK QMon for monitoring  ANC 12/2/2024-1.35   Re-check- CBC with diff. and Clozaril level 12/4/24 at 2000 prior to medication administration, for continued Clozaril management.  Most recent ECG reviewed 9/9/24 - normal ECG, NSR  Clozaril increased to 900 mg nightly on 10/29/24 - Clozaril level 894 on 11/04/24   Risperdal discontinued on 10/28/24 for moderate affectivity  Ativan changed to Klonopin 0.5 mg daily/ 1 mg qhs    Continue melatonin 3 mg nightly for insomnia  Continue prazosin 5 mg nightly for PTSD associated nightmares; doses to be adjusted as indicated   Continue trazodone 50 mg nightly for insomnia  Discharge disposition: LTSR

## 2024-12-03 NOTE — NURSING NOTE
PRN Atarax 50 mg is effective, she is awake and listening to the radio. She denies all psych s/s.

## 2024-12-03 NOTE — TREATMENT TEAM
12/03/24 0900   Team Meeting   Meeting Type Daily Rounds   Team Members Present   Team Members Present Physician;Nurse;;;Occupational Therapist   Physician Team Member Dr. Banuelos / Dr. Hurst / Dr. Bland / JAIR Ziegler   Nursing Team Member Dona   Care Management Team Member Manju / Tee   Social Work Team Member Anastacio   OT Team Member Eduardo   Patient/Family Present   Patient Present No   Patient's Family Present No     Pt is reported to have been given Atarax 50 at 1900 for anxiety symptoms which was effective. Pt is reported to be withdrawn to room, and showered yesterday.

## 2024-12-03 NOTE — PROGRESS NOTES
Progress Note - Behavioral Health   Name: Meli Nowak 59 y.o. female I MRN: [id number]  Unit/Bed#: [facility] 651-02 I Date of Admission: 7/23/2024   Date of Service: 12/3/2024 I Hospital Day: 133     Assessment & Plan  Schizoaffective disorder, bipolar type (HCC)  No medication changes today  Continue Depakote 500 mg nightly-11/29  Most recent VPA level 54 on 10/12/24  CMP on 10/12/24 reviewed, WNL  Clozaril 900 mg nightly for schizoaffective disorder  Clozaril monitoring:   CRP, CBC/diff, CK QMon for monitoring  ANC 12/2/2024-1.35   Re-check- CBC with diff. and Clozaril level 12/4/24 at 2000 prior to medication administration, for continued Clozaril management.  Most recent ECG reviewed 9/9/24 - normal ECG, NSR  Clozaril increased to 900 mg nightly on 10/29/24 - Clozaril level 894 on 11/04/24   Risperdal discontinued on 10/28/24 for moderate affectivity  Ativan changed to Klonopin 0.5 mg daily/ 1 mg qhs    Continue melatonin 3 mg nightly for insomnia  Continue prazosin 5 mg nightly for PTSD associated nightmares; doses to be adjusted as indicated   Continue trazodone 50 mg nightly for insomnia  Discharge disposition: LTSR        ASHLIE (obstructive sleep apnea)  - f/u w/ Sleep Medicine in outpatient setting and consider CPAP as indicated          Plan   Progress Toward Goals:   Meli is progressing towards goals of inpatient psychiatric treatment by continued medication compliance and is attending therapeutic modalities on the milieu. However, the patient continues to require inpatient psychiatric hospitalization for continued medication management and titration to optimize symptom reduction, improve sleep hygiene, and demonstrate adequate self-care.    Recommended Treatment: Treatment plan and medication changes discussed and per the attending physician the plan is:    1.Continue with group therapy, milieu therapy and occupational therapy  2.Behavioral Health checks every 7 minutes  3.Continue frequent safety checks and  vitals per unit protocol  4.Continue with SLIM medical management as indicated  5.Continue with current medication regimen  6.Will review labs in the a.m.  7.Disposition Planning: Discharge planning and efforts remain ongoing    Behavioral Health Medications: all current active meds have been reviewed.  Current Facility-Administered Medications   Medication Dose Route Frequency Provider Last Rate    acetaminophen  650 mg Oral Q4H PRN Marie Ziegler, CRNP      acetaminophen  650 mg Oral Q4H PRN Marie Ziegler, CRNP      acetaminophen  975 mg Oral Q6H PRN Marie Ziegler, CRNP      albuterol  2 puff Inhalation Q4H PRN Kristen Logan, CRNP      aluminum-magnesium hydroxide-simethicone  30 mL Oral Q4H PRN Parris Bolanos, CRNP      Artificial Tears  1 drop Both Eyes Q6H PRN Dereje Banuelos MD      atorvastatin  10 mg Oral Daily Marie Ziegler, CRNP      bisacodyl  10 mg Oral Daily PRN Kristen Logan, CRNP      bisacodyl  10 mg Rectal Daily PRN Marie Ziegler, CRNP      carvedilol  6.25 mg Oral BID With Meals Marie Ziegler, CRNP      clonazePAM  0.5 mg Oral Daily Marie Ziegler, CRNP      clonazePAM  1 mg Oral HS Marie Ziegler, CRNP      cloZAPine  900 mg Oral HS Marie Ziegler, CRNP      cyanocobalamin  1,000 mcg Oral Daily Marie Ziegler, CRNP      Diclofenac Sodium  2 g Topical 4x Daily PRN Marie Ziegler, CRNP      famotidine  20 mg Oral BID Shan Fitzgerald DO      fluticasone  1 puff Inhalation Daily Marie Ziegler, CRNP      hydrOXYzine HCL  25 mg Oral Q6H PRN Max 4/day Marie Ziegler, CRNP      hydrOXYzine HCL  50 mg Oral Q6H PRN Max 4/day Marie Ziegler, CRNP      LORazepam  1 mg Intramuscular Q6H PRN Max 3/day Marie Ziegler, CRNP      LORazepam  1 mg Oral Q6H PRN Max 3/day Marie Ziegler, CRNP      melatonin  3 mg Oral HS Marie Ziegler, CRNP      mirtazapine  7.5 mg Oral HS PRN Marie Ziegler, CRNP      Multivitamin  15 mL Oral Daily Marie Ziegler, CRNP      OLANZapine   "2.5 mg Oral Q6H PRN Dereje Banuelos MD      Or    OLANZapine  2.5 mg Intramuscular Q6H PRN Dereje Banuelos MD      OLANZapine  5 mg Oral Q6H PRN Dereje Banuelos MD      Or    OLANZapine  5 mg Intramuscular Q6H PRN Dereje Banuelos MD      ondansetron  4 mg Oral Q6H PRN Darin Lawton MD      polyethylene glycol  17 g Oral Daily Kristen Logan, JOSE ELIAS      polyethylene glycol  17 g Oral Daily PRN JOSE ELIAS Figueroa      prazosin  5 mg Oral HS Marie Ziegler, JOSE ELIAS      senna-docusate sodium  2 tablet Oral HS Rehana Borrego PA-C      traZODone  50 mg Oral HS JOSE ELIAS Figueroa      valproic acid  500 mg Oral HS JOSE ELIAS Figueroa         Risks / Benefits of Treatment:  Risks, benefits, and possible side effects of medications explained to patient and patient verbalizes understanding and agreement for treatment.       Subjective    Legal Status: 304   Patient was seen today for continuation of care, records reviewed and patient was discussed with the morning case review team.    Meli was seen today for psychiatric follow-up.  On assessment today, Meli was lying in bed. Meli has been withdrawn to room, not attending any milieu activities despite staff efforts to encourage and remind patient of activities. Reports to feel \"good today but tired\". States to want to rest this morning until lunch, and she will get up to walk for a half hour. Patient aware of slow off titration of Depakote due to daytime sedation. Endorses mild depression anxiety, manageable at this time. Appetite and sleep are stable. Encouraged Meli to attend groups and perform personal hygiene.CBC, CK and CRP obtained for continued Clozaril management. ANC 1.35 12/2/24. Re-check CBC with differential and Clozaril level 12/4/24 at 2000 prior to medication administration, for continual Clozaril management. Meli denies acute suicidal/self-harm ideation/intent/plan upon direct inquiry at this time.  [place] remains behaviorally appropriate, no " agitation or aggression noted on exam or in report.  Meli also denies HI/AH/VH, and does not appear overtly manic. Impulse control is intact.  Meli remains adherent to her current psychotropic medication regimen and denies any side effects from medications, as well as none noted on exam.    Meli is currently assessed as being at their baseline with continued need for medication management, supervision for safety and ADL’s. These services are not currently available in a less restrictive environment necessitating continued hospital stay.  Meli should remain on the unit until these services are available, due to likelihood of mental decompensation and readmission if discharged to an unsupervised setting.  Assertive discharge planning and collaboration with multiple providers (inpatient and community based) remains ongoing.  Meli is currently awaiting for CRR.     Psychiatric Review of Systems:  Behavior over the last 24 hours:  unchanged.   Sleep: Good  Appetite: Good  Medication side effects: none  ROS: no complaints, denies shortness of breath or chest pain and all other systems are negative for acute changes        Objective    Vitals:  Vitals:    12/03/24 0808   BP: 109/52   Pulse: 73   Resp: 18   Temp: 97.8 °F (36.6 °C)   SpO2: 97%       Laboratory Results:  I have personally reviewed all pertinent laboratory/tests results.    Mental Status Evaluation:  Appearance:  disheveled, marginal hygiene   Behavior:  cooperative, calm   Speech:  normal rate and volume   Mood:  less anxious, less depressed   Affect:  constricted   Thought Process:  perseverative, concrete   Thought Content:  Gnosticism preoccupation, paranoid ideation   Perceptual Disturbances: auditory hallucinations still present, but less frequent   Risk Potential: Suicidal ideation - None  Homicidal ideation - None  Potential for aggression - No   Memory:  recent and remote memory grossly intact   Sensorium  person, place, and time/date       Consciousness:  alert and awake   Attention: attention span and concentration are age appropriate   Insight:  limited   Judgment: limited   Gait/Station: normal gait/station   Motor Activity: no abnormal movements       Counseling / Coordination of Care:  Patient's progress reviewed with nursing staff.  Medications, treatment progress and treatment plan reviewed with patient.  Supportive counseling provided to the patient.    This note was completed in part utilizing Dragon dictation Software. Grammatical, translation, syntax errors, random word insertions, spelling mistakes, and incomplete sentences may be an occasional consequence of this system secondary to software limitations with voice recognition, ambient noise, and hardware issues. If you have any questions or concerns about the content, text, or information contained within the body of this dictation, please contact the provider for clarification

## 2024-12-03 NOTE — PROGRESS NOTES
Pt has not attended group since 11/23/24.  Pt only visible in snack line and disheveled hospital gown or in room.      12/03/24 0900 12/03/24 1000 12/03/24 1300   Activity/Group Checklist   Group Exercise Other (Comment)  (Group Art Therapy/Psychodynamic, Open Choice followed by Process Discussion) Other (Comment)  (Reflection and reminiscing)   Attendance Did not attend Did not attend Did not attend

## 2024-12-03 NOTE — PLAN OF CARE
Problem: Alteration in Thoughts and Perception  Goal: Treatment Goal: Gain control of psychotic behaviors/thinking, reduce/eliminate presenting symptoms and demonstrate improved reality functioning upon discharge  Outcome: Progressing  Goal: Verbalize thoughts and feelings  Description: Interventions:  - Promote a nonjudgmental and trusting relationship with the patient through active listening and therapeutic communication  - Assess patient's level of functioning, behavior and potential for risk  - Engage patient in 1 on 1 interactions  - Encourage patient to express fears, feelings, frustrations, and discuss symptoms    - [place] patient to reality, help patient recognize reality-based thinking   - Administer medications as ordered and assess for potential side effects  - Provide the patient education related to the signs and symptoms of the illness and desired effects of prescribed medications  Outcome: Progressing  Goal: Refrain from acting on delusional thinking/internal stimuli  Description: Interventions:  - Monitor patient closely, per order   - Utilize least restrictive measures   - Set reasonable limits, give positive feedback for acceptable   - Administer medications as ordered and monitor of potential side effects  Outcome: Progressing  Goal: Agree to be compliant with medication regime, as prescribed and report medication side effects  Description: Interventions:  - Offer appropriate PRN medication and supervise ingestion; conduct AIMS, as needed   Outcome: Progressing  Goal: Attend and participate in unit activities, including therapeutic, recreational, and educational groups  Description: Interventions:  -Encourage Visitation and family involvement in care  Outcome: Progressing  Goal: Recognize dysfunctional thoughts, communicate reality-based thoughts at the time of discharge  Description: Interventions:  - Provide medication and psycho-education to assist patient in compliance and developing  insight into his/her illness   Outcome: Progressing  Goal: Complete daily ADLs, including personal hygiene independently, as able  Description: Interventions:  - Observe, teach, and assist patient with ADLS  - Monitor and promote a balance of rest/activity, with adequate nutrition and elimination   Outcome: Progressing     Problem: Depression  Goal: Treatment Goal: Demonstrate behavioral control of depressive symptoms, verbalize feelings of improved mood/affect, and adopt new coping skills prior to discharge  Outcome: Progressing  Goal: Verbalize thoughts and feelings  Description: Interventions:  - Assess and re-assess patient's level of risk   - Engage patient in 1:1 interactions, daily, for a minimum of 15 minutes   - Encourage patient to express feelings, fears, frustrations, hopes   Outcome: Progressing  Goal: Refrain from isolation  Description: Interventions:  - Develop a trusting relationship   - Encourage socialization   Outcome: Progressing  Goal: Refrain from self-neglect  Outcome: Progressing  Goal: Attend and participate in unit activities, including therapeutic, recreational, and educational groups  Description: Interventions:  - Provide therapeutic and educational activities daily, encourage attendance and participation, and document same in the medical record   Outcome: Progressing

## 2024-12-04 LAB
BASOPHILS # BLD AUTO: 0.07 THOUSANDS/ΜL (ref 0–0.1)
BASOPHILS NFR BLD AUTO: 1 % (ref 0–1)
CLOZAPINE SERPL-MCNC: 1076 NG/ML (ref 350–900)
EOSINOPHIL # BLD AUTO: 0.16 THOUSAND/ΜL (ref 0–0.61)
EOSINOPHIL NFR BLD AUTO: 2 % (ref 0–6)
ERYTHROCYTE [DISTWIDTH] IN BLOOD BY AUTOMATED COUNT: 13.4 % (ref 11.6–15.1)
HCT VFR BLD AUTO: 43.5 % (ref 34.8–46.1)
HGB BLD-MCNC: 14.3 G/DL (ref 11.5–15.4)
IMM GRANULOCYTES # BLD AUTO: 0.06 THOUSAND/UL (ref 0–0.2)
IMM GRANULOCYTES NFR BLD AUTO: 1 % (ref 0–2)
LYMPHOCYTES # BLD AUTO: 3.41 THOUSANDS/ΜL (ref 0.6–4.47)
LYMPHOCYTES NFR BLD AUTO: 39 % (ref 14–44)
MCH RBC QN AUTO: 32.1 PG (ref 26.8–34.3)
MCHC RBC AUTO-ENTMCNC: 32.9 G/DL (ref 31.4–37.4)
MCV RBC AUTO: 98 FL (ref 82–98)
MONOCYTES # BLD AUTO: 0.81 THOUSAND/ΜL (ref 0.17–1.22)
MONOCYTES NFR BLD AUTO: 9 % (ref 4–12)
NEUTROPHILS # BLD AUTO: 4.18 THOUSANDS/ΜL (ref 1.85–7.62)
NEUTS SEG NFR BLD AUTO: 48 % (ref 43–75)
NRBC BLD AUTO-RTO: 0 /100 WBCS
PLATELET # BLD AUTO: 286 THOUSANDS/UL (ref 149–390)
PMV BLD AUTO: 9.4 FL (ref 8.9–12.7)
RBC # BLD AUTO: 4.46 MILLION/UL (ref 3.81–5.12)
WBC # BLD AUTO: 8.69 THOUSAND/UL (ref 4.31–10.16)

## 2024-12-04 PROCEDURE — 85025 COMPLETE CBC W/AUTO DIFF WBC: CPT

## 2024-12-04 PROCEDURE — 99232 SBSQ HOSP IP/OBS MODERATE 35: CPT | Performed by: STUDENT IN AN ORGANIZED HEALTH CARE EDUCATION/TRAINING PROGRAM

## 2024-12-04 PROCEDURE — 80159 DRUG ASSAY CLOZAPINE: CPT

## 2024-12-04 RX ADMIN — CARVEDILOL 6.25 MG: 6.25 TABLET, FILM COATED ORAL at 08:20

## 2024-12-04 RX ADMIN — Medication 15 ML: at 08:21

## 2024-12-04 RX ADMIN — CYANOCOBALAMIN TAB 1000 MCG 1000 MCG: 1000 TAB at 08:20

## 2024-12-04 RX ADMIN — FAMOTIDINE 20 MG: 20 TABLET ORAL at 17:02

## 2024-12-04 RX ADMIN — TRAZODONE HYDROCHLORIDE 50 MG: 50 TABLET ORAL at 21:27

## 2024-12-04 RX ADMIN — ACETAMINOPHEN 975 MG: 325 TABLET, FILM COATED ORAL at 12:28

## 2024-12-04 RX ADMIN — CARVEDILOL 6.25 MG: 6.25 TABLET, FILM COATED ORAL at 17:02

## 2024-12-04 RX ADMIN — FAMOTIDINE 20 MG: 20 TABLET ORAL at 08:20

## 2024-12-04 RX ADMIN — CLOZAPINE 900 MG: 200 TABLET ORAL at 21:27

## 2024-12-04 RX ADMIN — VALPROIC ACID 500 MG: 500 SOLUTION ORAL at 21:27

## 2024-12-04 RX ADMIN — ATORVASTATIN CALCIUM 10 MG: 10 TABLET, FILM COATED ORAL at 08:20

## 2024-12-04 RX ADMIN — FLUTICASONE FUROATE 1 PUFF: 100 POWDER RESPIRATORY (INHALATION) at 09:28

## 2024-12-04 RX ADMIN — SENNOSIDES AND DOCUSATE SODIUM 2 TABLET: 8.6; 5 TABLET ORAL at 21:27

## 2024-12-04 RX ADMIN — PRAZOSIN HYDROCHLORIDE 5 MG: 5 CAPSULE ORAL at 21:27

## 2024-12-04 RX ADMIN — CLONAZEPAM 0.5 MG: 0.5 TABLET ORAL at 08:20

## 2024-12-04 RX ADMIN — CLONAZEPAM 1 MG: 1 TABLET ORAL at 21:27

## 2024-12-04 RX ADMIN — MELATONIN TAB 3 MG 3 MG: 3 TAB at 21:27

## 2024-12-04 NOTE — PROGRESS NOTES
Progress Note - Behavioral Health   Name: Meli Nowak 59 y.o. female I MRN: [id number]  Unit/Bed#: [facility] 651-02 I Date of Admission: 7/23/2024   Date of Service: 12/4/2024 I Hospital Day: 134     Assessment & Plan  Schizoaffective disorder, bipolar type (HCC)  No medication changes today  Continue Depakote 500 mg nightly-11/29  Most recent VPA level 54 on 10/12/24  CMP on 10/12/24 reviewed, WNL  Clozaril 900 mg nightly for schizoaffective disorder  Clozaril monitoring:   CRP, CBC/diff, CK QMon for monitoring  ANC 12/2/2024-1.35   Re-check- CBC with diff. and Clozaril level today at 2000 prior to medication administration, for continued Clozaril management.  Most recent ECG reviewed 9/9/24 - normal ECG, NSR  Clozaril increased to 900 mg nightly on 10/29/24 - Clozaril level 894 on 11/04/24   Risperdal discontinued on 10/28/24 for moderate affectivity  Ativan changed to Klonopin 0.5 mg daily/ 1 mg qhs    Continue melatonin 3 mg nightly for insomnia  Continue prazosin 5 mg nightly for PTSD associated nightmares; doses to be adjusted as indicated   Continue trazodone 50 mg nightly for insomnia  Discharge disposition: LTSR        ASHLIE (obstructive sleep apnea)  - f/u w/ Sleep Medicine in outpatient setting and consider CPAP as indicated          Plan   Progress Toward Goals:   Meli is progressing towards goals of inpatient psychiatric treatment by continued medication compliance and is attending therapeutic modalities on the milieu. However, the patient continues to require inpatient psychiatric hospitalization for continued medication management and titration to optimize symptom reduction, improve sleep hygiene, and demonstrate adequate self-care.    Recommended Treatment: Treatment plan and medication changes discussed and per the attending physician the plan is:    1.Continue with group therapy, milieu therapy and occupational therapy  2.Behavioral Health checks every 7 minutes  3.Continue frequent safety checks and  vitals per unit protocol  4.Continue with SLIM medical management as indicated  5.Continue with current medication regimen  6.Will review labs in the a.m.  7.Disposition Planning: Discharge planning and efforts remain ongoing    Behavioral Health Medications: all current active meds have been reviewed and continue current psychiatric medications.  Current Facility-Administered Medications   Medication Dose Route Frequency Provider Last Rate    acetaminophen  650 mg Oral Q4H PRN Marie Ziegler, CRNP      acetaminophen  650 mg Oral Q4H PRN Marie Ziegler, CRNP      acetaminophen  975 mg Oral Q6H PRN Marie Ziegler, CRNP      albuterol  2 puff Inhalation Q4H PRN Kristen Logan, CRNP      aluminum-magnesium hydroxide-simethicone  30 mL Oral Q4H PRN Parris Bolanos, CRNP      Artificial Tears  1 drop Both Eyes Q6H PRN Dereje Banuelos MD      atorvastatin  10 mg Oral Daily Marie Ziegler, CRNP      bisacodyl  10 mg Oral Daily PRN Kristen Logan, CRNP      bisacodyl  10 mg Rectal Daily PRN Marie Ziegler, CRNP      carvedilol  6.25 mg Oral BID With Meals Marie Ziegler, CRNP      clonazePAM  0.5 mg Oral Daily Marie Ziegler, CRNP      clonazePAM  1 mg Oral HS Marie Ziegler, CRNP      cloZAPine  900 mg Oral HS Marie Ziegler, CRNP      cyanocobalamin  1,000 mcg Oral Daily Marie Ziegler, CRNP      Diclofenac Sodium  2 g Topical 4x Daily PRN Marie Ziegler, CRNP      famotidine  20 mg Oral BID Shan Fitzgerald DO      fluticasone  1 puff Inhalation Daily Marie Ziegler, CRNP      hydrOXYzine HCL  25 mg Oral Q6H PRN Max 4/day Marie Ziegler, CRNP      hydrOXYzine HCL  50 mg Oral Q6H PRN Max 4/day Marie Ziegler, CRNP      LORazepam  1 mg Intramuscular Q6H PRN Max 3/day Marie Ziegler, CRNP      LORazepam  1 mg Oral Q6H PRN Max 3/day Marie Ziegler, CRNP      melatonin  3 mg Oral HS Marie Ziegler, CRNP      mirtazapine  7.5 mg Oral HS PRN Marie Ziegler, CRNP      Multivitamin  15 mL Oral  Daily JOSE ELIAS Figueroa      OLANZapine  2.5 mg Oral Q6H PRN Dereje Banuelos MD      Or    OLANZapine  2.5 mg Intramuscular Q6H PRN Dereje Banuelos MD      OLANZapine  5 mg Oral Q6H PRN Dereje Banuelos MD      Or    OLANZapine  5 mg Intramuscular Q6H PRN Dereje Banuelos MD      ondansetron  4 mg Oral Q6H PRN Darin Lawton MD      polyethylene glycol  17 g Oral Daily JOSE ELIAS Ortega      polyethylene glycol  17 g Oral Daily PRN JOSE ELIAS Figueroa      prazosin  5 mg Oral HS JOSE ELIAS Figueroa      senna-docusate sodium  2 tablet Oral HS Rehana Borrego PA-C      traZODone  50 mg Oral HS JOSE ELIAS Figueroa      valproic acid  500 mg Oral HS JOSE ELIAS Figueroa         Risks / Benefits of Treatment:  Risks, benefits, and possible side effects of medications explained to patient and patient verbalizes understanding and agreement for treatment.       Subjective    Patient was seen today for continuation of care, records reviewed and patient was discussed with the morning case review team.    Meli was seen today for psychiatric follow-up.  On assessment today, Meli was visible on the unit.  Less preoccupied today, patient continues to report daytime sedation but otherwise denies all psychiatric symptoms.  No as needed medications given overnight.  Patient's ANC 1.35 as of 12/3/2024, we will obtain CBC and clozapine level this evening.  Sleep and appetite unchanged. Meli denies acute suicidal/self-harm ideation/intent/plan upon direct inquiry at this time.  M[place] remains behaviorally appropriate, no agitation or aggression noted on exam or in report.  Meli also denies HI/AH/VH, and does not appear overtly manic.  No overt delusions or paranoia are verbalized. Impulse control is intact.  Meli remains adherent to her current psychotropic medication regimen and denies any side effects from medications, as well as none noted on exam.    Meli is currently assessed as being at their baseline with  continued need for medication management, supervision for safety and ADL’s. These services are not currently available in a less restrictive environment necessitating continued hospital stay.  Meli should remain on the unit until these services are available, due to likelihood of mental decompensation and readmission if discharged to an unsupervised setting.  Assertive discharge planning and collaboration with multiple providers (inpatient and community based) remains ongoing.  Meli is currently awaiting for CRR     Psychiatric Review of Systems:  Behavior over the last 24 hours:  unchanged.   Sleep: good  Appetite: good  Medication side effects: none  ROS: no complaints, denies shortness of breath or chest pain and all other systems are negative for acute changes        Objective    Vitals:  Vitals:    12/04/24 0732   BP: 124/61   Pulse: 72   Resp: 17   Temp: (!) 96.9 °F (36.1 °C)   SpO2: 92%       Laboratory Results:  I have personally reviewed all pertinent laboratory/tests results.  Most Recent Labs:   Lab Results   Component Value Date    WBC 7.45 12/02/2024    RBC 4.43 12/02/2024    HGB 14.0 12/02/2024    HCT 43.4 12/02/2024     12/02/2024    RDW 13.5 12/02/2024    NEUTROABS 1.35 (L) 12/02/2024    SODIUM 138 10/12/2024    K 4.1 10/12/2024     10/12/2024    CO2 29 10/12/2024    BUN 18 10/12/2024    CREATININE 0.97 10/12/2024    GLUC 146 (H) 10/12/2024    GLUF 98 08/10/2024    CALCIUM 9.2 10/12/2024    AST 12 (L) 10/12/2024    ALT 12 10/12/2024    ALKPHOS 84 10/12/2024    TP 6.7 10/12/2024    ALB 3.9 10/12/2024    TBILI 0.26 10/12/2024    VALPROICTOT 54 10/12/2024    AMMONIA 32 07/03/2024    WZX0GSWSGANW 2.000 07/24/2024    HGBA1C 6.4 (H) 05/03/2024     05/03/2024       Mental Status Evaluation:  Appearance:  disheveled, marginal hygiene   Behavior:  cooperative, calm, guarded   Speech:  normal rate and volume   Mood:  less anxious, less depressed   Affect:  constricted   Thought Process:   perseverative, concrete   Thought Content:  Worship preoccupation, paranoid ideation   Perceptual Disturbances: auditory hallucinations still present, but less frequent   Risk Potential: Suicidal ideation - None  Homicidal ideation - None  Potential for aggression - No   Memory:  recent and remote memory grossly intact   Sensorium  person, place, and time/date      Consciousness:  alert and awake   Attention: attention span and concentration are age appropriate   Insight:  limited   Judgment: limited   Gait/Station: normal gait/station   Motor Activity: no abnormal movements       Counseling / Coordination of Care:  Patient's progress reviewed with nursing staff.  Medications, treatment progress and treatment plan reviewed with patient.  Supportive counseling provided to the patient.    This note was completed in part utilizing Dragon dictation Software. Grammatical, translation, syntax errors, random word insertions, spelling mistakes, and incomplete sentences may be an occasional consequence of this system secondary to software limitations with voice recognition, ambient noise, and hardware issues. If you have any questions or concerns about the content, text, or information contained within the body of this dictation, please contact the provider for clarification

## 2024-12-04 NOTE — TREATMENT TEAM
12/04/24 0800   Team Meeting   Meeting Type Daily Rounds   Team Members Present   Team Members Present Physician;Nurse;;;Occupational Therapist   Physician Team Member Dr. Banuelos / Dr. Hurst / Dr. Bland / JAIR Ziegler   Nursing Team Member David/Ketih   Care Management Team Member Manju / Tee   Social Work Team Member Anastacio   OT Team Member Eduardo   Patient/Family Present   Patient Present No   Patient's Family Present No     Pt is reported to not attend groups and somatic. Pt is reported to be pleasant, ate 100% of meals, slept. Pt is reported to be anxious regarding discharge.

## 2024-12-04 NOTE — PLAN OF CARE
Problem: Prexisting or High Potential for Compromised Skin Integrity  Goal: Skin integrity is maintained or improved  Description: INTERVENTIONS:  - Identify patients at risk for skin breakdown  - Assess and monitor skin integrity  - Assess and monitor nutrition and hydration status  - Monitor labs   - Assess for incontinence   - Turn and reposition patient  - Assist with mobility/ambulation  - Relieve pressure over bony prominences  - Avoid friction and shearing  - Provide appropriate hygiene as needed including keeping skin clean and dry  - Evaluate need for skin moisturizer/barrier cream  - Collaborate with interdisciplinary team   - Patient/family teaching  - Consider wound care consult   Outcome: Progressing     Problem: Ineffective Coping  Goal: Participates in unit activities  Description: Interventions:  - Provide therapeutic environment   - Provide required programming   - Redirect inappropriate behaviors   Outcome: Not Progressing  Goal: Understands least restrictive measures  Description: Interventions:  - Utilize least restrictive behavior  Outcome: Progressing  Goal: Free from restraint events  Description: - Utilize least restrictive measures   - Provide behavioral interventions   - Redirect inappropriate behaviors   Outcome: Progressing     Problem: Risk for Self Injury/Neglect  Goal: Refrain from harming self  Description: Interventions:  - Monitor patient closely, per order  - Develop a trusting relationship  - Supervise medication ingestion, monitor effects and side effects   Outcome: Progressing     Problem: Depression  Goal: Treatment Goal: Demonstrate behavioral control of depressive symptoms, verbalize feelings of improved mood/affect, and adopt new coping skills prior to discharge  Outcome: Progressing

## 2024-12-04 NOTE — NURSING NOTE
Patient is alert and oriented able to make her needs known. She is visible in the milieu with no peer interactions.  She c/o right leg pain 7/10,  PRN tylenol 975 administered at 1230. She is calm and pleasant on approach. She endorses mild anxiety/depression and denies all other psych s/s. No behaviors observed this shift. Safety checks ongoing.

## 2024-12-04 NOTE — ASSESSMENT & PLAN NOTE
No medication changes today  Continue Depakote 500 mg nightly-11/29  Most recent VPA level 54 on 10/12/24  CMP on 10/12/24 reviewed, WNL  Clozaril 900 mg nightly for schizoaffective disorder  Clozaril monitoring:   CRP, CBC/diff, CK QMon for monitoring  ANC 12/2/2024-1.35   Re-check- CBC with diff. and Clozaril level today at 2000 prior to medication administration, for continued Clozaril management.  Most recent ECG reviewed 9/9/24 - normal ECG, NSR  Clozaril increased to 900 mg nightly on 10/29/24 - Clozaril level 894 on 11/04/24   Risperdal discontinued on 10/28/24 for moderate affectivity  Ativan changed to Klonopin 0.5 mg daily/ 1 mg qhs    Continue melatonin 3 mg nightly for insomnia  Continue prazosin 5 mg nightly for PTSD associated nightmares; doses to be adjusted as indicated   Continue trazodone 50 mg nightly for insomnia  Discharge disposition: LTSR

## 2024-12-04 NOTE — NURSING NOTE
Meli remains isolative to her room. Meli endorses anxiety about d/c. Meli is pleasant and cooperative. Patient with minimal interaction with peers. Meli is medication compliant. Meli finds comfort in listening to music in her room. Emotional support provided. Will continue to monitor and support during treatment.

## 2024-12-04 NOTE — NURSING NOTE
Pt calm and cooperative. Pleasant on approach. Visible at times, but mostly keeps to self. +meals and meds. Good appetite. Compliant w/ mouth checks. Denies unmet needs. Q15 safety checks maintained.

## 2024-12-05 PROCEDURE — 99232 SBSQ HOSP IP/OBS MODERATE 35: CPT | Performed by: STUDENT IN AN ORGANIZED HEALTH CARE EDUCATION/TRAINING PROGRAM

## 2024-12-05 RX ADMIN — FAMOTIDINE 20 MG: 20 TABLET ORAL at 17:12

## 2024-12-05 RX ADMIN — CLONAZEPAM 0.5 MG: 0.5 TABLET ORAL at 08:13

## 2024-12-05 RX ADMIN — CLOZAPINE 900 MG: 200 TABLET ORAL at 21:29

## 2024-12-05 RX ADMIN — VALPROIC ACID 500 MG: 500 SOLUTION ORAL at 21:29

## 2024-12-05 RX ADMIN — FAMOTIDINE 20 MG: 20 TABLET ORAL at 08:13

## 2024-12-05 RX ADMIN — MELATONIN TAB 3 MG 3 MG: 3 TAB at 21:29

## 2024-12-05 RX ADMIN — SENNOSIDES AND DOCUSATE SODIUM 2 TABLET: 8.6; 5 TABLET ORAL at 21:29

## 2024-12-05 RX ADMIN — TRAZODONE HYDROCHLORIDE 50 MG: 50 TABLET ORAL at 21:29

## 2024-12-05 RX ADMIN — CYANOCOBALAMIN TAB 1000 MCG 1000 MCG: 1000 TAB at 08:13

## 2024-12-05 RX ADMIN — CLONAZEPAM 1 MG: 1 TABLET ORAL at 21:29

## 2024-12-05 RX ADMIN — CARVEDILOL 6.25 MG: 6.25 TABLET, FILM COATED ORAL at 16:41

## 2024-12-05 RX ADMIN — POLYETHYLENE GLYCOL 3350 17 G: 17 POWDER, FOR SOLUTION ORAL at 08:12

## 2024-12-05 RX ADMIN — ATORVASTATIN CALCIUM 10 MG: 10 TABLET, FILM COATED ORAL at 08:13

## 2024-12-05 RX ADMIN — PRAZOSIN HYDROCHLORIDE 5 MG: 5 CAPSULE ORAL at 21:29

## 2024-12-05 RX ADMIN — FLUTICASONE FUROATE 1 PUFF: 100 POWDER RESPIRATORY (INHALATION) at 08:15

## 2024-12-05 RX ADMIN — CARVEDILOL 6.25 MG: 6.25 TABLET, FILM COATED ORAL at 08:13

## 2024-12-05 RX ADMIN — Medication 15 ML: at 08:13

## 2024-12-05 NOTE — TREATMENT TEAM
12/05/24 0800   Team Meeting   Meeting Type Daily Rounds   Team Members Present   Team Members Present Physician;Nurse;;;Occupational Therapist   Physician Team Member Dr. Banuelos / Dr. Hurst / Dr. Bland / JAIR Ziegler   Nursing Team Member David/Keith   Care Management Team Member Manju / Tee   Social Work Team Member Anastacio   OT Team Member Eduardo   Patient/Family Present   Patient Present No   Patient's Family Present No     Pt is reported to have ate 100% and reported to be cheerful today.

## 2024-12-05 NOTE — TREATMENT TEAM
12/04/24 2055   Provider Notification   Reason for Communication Procedure/test   Provider Name Mary Ann Hummel   Provider Role Consulting physician   Method of Communication Other (Comment)   Notification Time 2055     Clozaril level of 1076 reviewed since patient appeared non toxic was ordered to administer the 900 mg dose at HS.

## 2024-12-05 NOTE — ASSESSMENT & PLAN NOTE
No medication changes today  Continue Depakote 500 mg nightly-11/29  Most recent VPA level 54 on 10/12/24  CMP on 10/12/24 reviewed, WNL  Clozaril 900 mg nightly for schizoaffective disorder  Clozaril monitoring:   CRP, CBC/diff, CK QMon for monitoring  ANC 12/5/24- 4.18  Most recent ECG reviewed 9/9/24 - normal ECG, NSR  Clozaril increased to 900 mg nightly on 10/29/24 - Clozaril level 1076 on 12/5/24  Risperdal discontinued on 10/28/24 for moderate affectivity  Ativan changed to Klonopin 0.5 mg daily/ 1 mg qhs    Continue melatonin 3 mg nightly for insomnia  Continue prazosin 5 mg nightly for PTSD associated nightmares; doses to be adjusted as indicated   Continue trazodone 50 mg nightly for insomnia  Discharge disposition: LTSR

## 2024-12-05 NOTE — PLAN OF CARE
Problem: Alteration in Thoughts and Perception  Goal: Treatment Goal: Gain control of psychotic behaviors/thinking, reduce/eliminate presenting symptoms and demonstrate improved reality functioning upon discharge  Outcome: Progressing     Problem: Depression  Goal: Treatment Goal: Demonstrate behavioral control of depressive symptoms, verbalize feelings of improved mood/affect, and adopt new coping skills prior to discharge  Outcome: Progressing

## 2024-12-05 NOTE — PROGRESS NOTES
12/05/24 5530   Activity/Group Checklist   Group Life Skills  (happiness fill in blank)   Attendance Attended   Attendance Duration (min) 46-60   Interactions Interacted appropriately   Affect/Mood Calm;Appropriate   Goals Achieved Discussed coping strategies;Identified feelings

## 2024-12-05 NOTE — NURSING NOTE
"Patient AA&O. Denies physical pain, but states, \"I'm just tired.\" Endorses anxiety and depression, stating, \"A little bit but I'm OK.\" Denies any other psych-related S/S. Pleasant and cooperative with medications and care. No unmet needs at this time.  "

## 2024-12-05 NOTE — SOCIAL WORK
Spoke with pt in room 1:1 to f/u as pt has not been attend groups since 11/23.  Pt was dressed and disheveled hair.  Pt noted she was feeling dizzy and seeing stars (putting her hands over her eyes).  Alerted Nursing who provided support/intervention.  Pt indicated she knows she needs to work on anxiety and stay for group.  Pt stated she will try to attend groups this afternoon.  Pt was pleasant and noted she hopes to be d/c soon.

## 2024-12-05 NOTE — NURSING NOTE
Patient calm and cooperative. More alert and able to sit up to take medications without difficult. Speech noted to be calmer and less rapid. Medication and meal compliant. Denies SI/HI. Q 15 minute checks maintained.

## 2024-12-05 NOTE — PROGRESS NOTES
Progress Note - Behavioral Health   Name: Meli Nowak 59 y.o. female I MRN: [id number]  Unit/Bed#: [facility] 651-02 I Date of Admission: 7/23/2024   Date of Service: 12/5/2024 I Hospital Day: 135     Assessment & Plan  Schizoaffective disorder, bipolar type (HCC)  No medication changes today  Continue Depakote 500 mg nightly-11/29  Most recent VPA level 54 on 10/12/24  CMP on 10/12/24 reviewed, WNL  Clozaril 900 mg nightly for schizoaffective disorder  Clozaril monitoring:   CRP, CBC/diff, CK QMon for monitoring  ANC 12/5/24- 4.18  Most recent ECG reviewed 9/9/24 - normal ECG, NSR  Clozaril increased to 900 mg nightly on 10/29/24 - Clozaril level 1076 on 12/5/24  Risperdal discontinued on 10/28/24 for moderate affectivity  Ativan changed to Klonopin 0.5 mg daily/ 1 mg qhs    Continue melatonin 3 mg nightly for insomnia  Continue prazosin 5 mg nightly for PTSD associated nightmares; doses to be adjusted as indicated   Continue trazodone 50 mg nightly for insomnia  Discharge disposition: LTSR        ASHLIE (obstructive sleep apnea)  - f/u w/ Sleep Medicine in outpatient setting and consider CPAP as indicated          Plan   Progress Toward Goals:   Meli is progressing towards goals of inpatient psychiatric treatment by continued medication compliance and is attending therapeutic modalities on the milieu. However, the patient continues to require inpatient psychiatric hospitalization for continued medication management and titration to optimize symptom reduction, improve sleep hygiene, and demonstrate adequate self-care.    Recommended Treatment: Treatment plan and medication changes discussed and per the attending physician the plan is:    1.Continue with group therapy, milieu therapy and occupational therapy  2.Behavioral Health checks every 7 minutes  3.Continue frequent safety checks and vitals per unit protocol  4.Continue with [facility] medical management as indicated  5.Continue with current medication regimen  6.Will  review labs in the a.m.  7.Disposition Planning: Discharge planning and efforts remain ongoing    Behavioral Health Medications: all current active meds have been reviewed and continue current psychiatric medications.  Current Facility-Administered Medications   Medication Dose Route Frequency Provider Last Rate    acetaminophen  650 mg Oral Q4H PRN Marie Ziegler, CRNP      acetaminophen  650 mg Oral Q4H PRN Marie Ziegler, CRNP      acetaminophen  975 mg Oral Q6H PRN Marie Ziegler, CRNP      albuterol  2 puff Inhalation Q4H PRN Kristen Logan, CRNP      aluminum-magnesium hydroxide-simethicone  30 mL Oral Q4H PRN Parris Bolanos, CRNP      Artificial Tears  1 drop Both Eyes Q6H PRN Dereje Banuelos MD      atorvastatin  10 mg Oral Daily Marie Ziegler, CRNP      bisacodyl  10 mg Oral Daily PRN Kristen Logan, CRNP      bisacodyl  10 mg Rectal Daily PRN Marie Ziegler, CRNP      carvedilol  6.25 mg Oral BID With Meals Marie Ziegler, CRNP      clonazePAM  0.5 mg Oral Daily Marie Ziegler, CRNP      clonazePAM  1 mg Oral HS Marie Ziegler, CRNP      cloZAPine  900 mg Oral HS Marie Ziegler, CRNP      cyanocobalamin  1,000 mcg Oral Daily Marie Ziegler, CRNP      Diclofenac Sodium  2 g Topical 4x Daily PRN Marie Ziegler, CRNP      famotidine  20 mg Oral BID Shan Fitzgerald, DO      fluticasone  1 puff Inhalation Daily Marie Ziegler, CRNP      hydrOXYzine HCL  25 mg Oral Q6H PRN Max 4/day Marie Ziegler, CRNP      hydrOXYzine HCL  50 mg Oral Q6H PRN Max 4/day Marie Ziegler, CRNP      LORazepam  1 mg Intramuscular Q6H PRN Max 3/day Marie Ziegler, CRNP      LORazepam  1 mg Oral Q6H PRN Max 3/day Marie Ziegler, CRNP      melatonin  3 mg Oral HS Marie Ziegler, CRNP      mirtazapine  7.5 mg Oral HS PRN Marie Ziegler, CRNP      Multivitamin  15 mL Oral Daily Marie Ziegler, CRNP      OLANZapine  2.5 mg Oral Q6H PRN Dereje Banuelos MD      Or    OLANZapine  2.5 mg Intramuscular Q6H  PRN Dereje Banuelos MD      OLANZapine  5 mg Oral Q6H PRN Dereje Banuelos MD      Or    OLANZapine  5 mg Intramuscular Q6H PRN Dereje Banuelos MD      ondansetron  4 mg Oral Q6H PRN Darin Lawton MD      polyethylene glycol  17 g Oral Daily Kristen Ludwig Doreen, JOSE ELIAS      polyethylene glycol  17 g Oral Daily PRN JOSE ELIAS Figueroa      prazosin  5 mg Oral HS Marie Ziegler, JOSE ELIAS      senna-docusate sodium  2 tablet Oral HS Rehana Borrego PA-C      traZODone  50 mg Oral HS JOSE ELIAS Figueroa      valproic acid  500 mg Oral HS JOSE ELIAS Figuerao         Risks / Benefits of Treatment:  Risks, benefits, and possible side effects of medications explained to patient and patient verbalizes understanding and agreement for treatment.       Subjective    Patient was seen today for continuation of care, records reviewed and patient was discussed with the morning case review team.    Meli was seen today for psychiatric follow-up.  On assessment today, Meli was still isolated and withdrawn.  No longer as active on the unit, patient continues to be encouraged to be more social with peers.  Marginal hygiene also noted, patient requires continuous prompting from staff to complete self-care.  Depression and anxiety improved, patient continues to report auditory hallucinations which are chronic and not command in nature. ANC 1.35 on 12/2/24; repeat labs ordered with improved ANC of 4.18 on 12/4/2024. Clozaril level also obtained; currently 1076. QMonday labs to continue for Clozaril management.  Coping skills utilized for breakthrough symptoms. Sleep and appetite well. Meli denies acute suicidal/self-harm ideation/intent/plan upon direct inquiry at this time.  Meli remains behaviorally appropriate, no agitation or aggression noted on exam or in report. Impulse control is intact.  Meli remains adherent to her current psychotropic medication regimen and denies any side effects from medications, as well as none noted  on exam.    Psychiatric Review of Systems:  Behavior over the last 24 hours:  unchanged.   Sleep: good  Appetite: good  Medication side effects: none  ROS: no complaints, denies shortness of breath or chest pain and all other systems are negative for acute changes        Objective    Vitals:  Vitals:    12/05/24 0757   BP: 114/58   Pulse: 56   Resp: 18   Temp: (!) 97.2 °F (36.2 °C)   SpO2: 97%       Laboratory Results:  I have personally reviewed all pertinent laboratory/tests results.  Most Recent Labs:   Lab Results   Component Value Date    WBC 8.69 12/04/2024    RBC 4.46 12/04/2024    HGB 14.3 12/04/2024    HCT 43.5 12/04/2024     12/04/2024    RDW 13.4 12/04/2024    NEUTROABS 4.18 12/04/2024    SODIUM 138 10/12/2024    K 4.1 10/12/2024     10/12/2024    CO2 29 10/12/2024    BUN 18 10/12/2024    CREATININE 0.97 10/12/2024    GLUC 146 (H) 10/12/2024    GLUF 98 08/10/2024    CALCIUM 9.2 10/12/2024    AST 12 (L) 10/12/2024    ALT 12 10/12/2024    ALKPHOS 84 10/12/2024    TP 6.7 10/12/2024    ALB 3.9 10/12/2024    TBILI 0.26 10/12/2024    VALPROICTOT 54 10/12/2024    AMMONIA 32 07/03/2024    BTB1OQGBHMXE 2.000 07/24/2024    HGBA1C 6.4 (H) 05/03/2024     05/03/2024       Mental Status Evaluation:  Appearance:  disheveled, marginal hygiene   Behavior:  cooperative, calm, guarded   Speech:  normal rate and volume   Mood:  less anxious, less depressed   Affect:  constricted   Thought Process:  perseverative, concrete   Thought Content:  Voodoo preoccupation, paranoid ideation   Perceptual Disturbances: auditory hallucinations still present, but less frequent   Risk Potential: Suicidal ideation - None  Homicidal ideation - None  Potential for aggression - No   Memory:  recent and remote memory grossly intact   Sensorium  person, place, and time/date      Consciousness:  alert and awake   Attention: attention span and concentration are age appropriate   Insight:  limited   Judgment: limited    Gait/Station: normal gait/station   Motor Activity: no abnormal movements       Counseling / Coordination of Care:  Patient's progress reviewed with nursing staff.  Medications, treatment progress and treatment plan reviewed with patient.  Supportive counseling provided to the patient.    This note was completed in part utilizing Dragon dictation Software. Grammatical, translation, syntax errors, random word insertions, spelling mistakes, and incomplete sentences may be an occasional consequence of this system secondary to software limitations with voice recognition, ambient noise, and hardware issues. If you have any questions or concerns about the content, text, or information contained within the body of this dictation, please contact the provider for clarification

## 2024-12-05 NOTE — NURSING NOTE
Patient is visible in milieu only for meals otherwise withdrawn to her room. Patient is alert able to make her needs known. She is calm, pleasant on approach. She is medication and meal compliant. She endorses mild anxiety and depression and denies all other psych s/s. No Behaviors observed this shift. Safety checks ongoing.

## 2024-12-06 PROCEDURE — 99232 SBSQ HOSP IP/OBS MODERATE 35: CPT | Performed by: STUDENT IN AN ORGANIZED HEALTH CARE EDUCATION/TRAINING PROGRAM

## 2024-12-06 RX ORDER — VALPROIC ACID 250 MG/5ML
250 SOLUTION ORAL
Status: DISCONTINUED | OUTPATIENT
Start: 2024-12-06 | End: 2024-12-10

## 2024-12-06 RX ADMIN — POLYETHYLENE GLYCOL 3350 17 G: 17 POWDER, FOR SOLUTION ORAL at 08:18

## 2024-12-06 RX ADMIN — CLONAZEPAM 1 MG: 1 TABLET ORAL at 21:15

## 2024-12-06 RX ADMIN — MELATONIN TAB 3 MG 3 MG: 3 TAB at 21:15

## 2024-12-06 RX ADMIN — SENNOSIDES AND DOCUSATE SODIUM 2 TABLET: 8.6; 5 TABLET ORAL at 21:15

## 2024-12-06 RX ADMIN — CLOZAPINE 900 MG: 200 TABLET ORAL at 21:15

## 2024-12-06 RX ADMIN — CLONAZEPAM 0.5 MG: 0.5 TABLET ORAL at 08:17

## 2024-12-06 RX ADMIN — FAMOTIDINE 20 MG: 20 TABLET ORAL at 17:26

## 2024-12-06 RX ADMIN — VALPROIC ACID 250 MG: 500 SOLUTION ORAL at 21:15

## 2024-12-06 RX ADMIN — CARVEDILOL 6.25 MG: 6.25 TABLET, FILM COATED ORAL at 16:44

## 2024-12-06 RX ADMIN — Medication 15 ML: at 08:17

## 2024-12-06 RX ADMIN — CARVEDILOL 6.25 MG: 6.25 TABLET, FILM COATED ORAL at 08:17

## 2024-12-06 RX ADMIN — TRAZODONE HYDROCHLORIDE 50 MG: 50 TABLET ORAL at 21:15

## 2024-12-06 RX ADMIN — CYANOCOBALAMIN TAB 1000 MCG 1000 MCG: 1000 TAB at 08:17

## 2024-12-06 RX ADMIN — ATORVASTATIN CALCIUM 10 MG: 10 TABLET, FILM COATED ORAL at 08:17

## 2024-12-06 RX ADMIN — FAMOTIDINE 20 MG: 20 TABLET ORAL at 08:17

## 2024-12-06 RX ADMIN — FLUTICASONE FUROATE 1 PUFF: 100 POWDER RESPIRATORY (INHALATION) at 08:18

## 2024-12-06 RX ADMIN — PRAZOSIN HYDROCHLORIDE 5 MG: 5 CAPSULE ORAL at 21:15

## 2024-12-06 NOTE — PLAN OF CARE
Problem: Alteration in Thoughts and Perception  Goal: Verbalize thoughts and feelings  Description: Interventions:  - Promote a nonjudgmental and trusting relationship with the patient through active listening and therapeutic communication  - Assess patient's level of functioning, behavior and potential for risk  - Engage patient in 1 on 1 interactions  - Encourage patient to express fears, feelings, frustrations, and discuss symptoms    - [place] patient to reality, help patient recognize reality-based thinking   - Administer medications as ordered and assess for potential side effects  - Provide the patient education related to the signs and symptoms of the illness and desired effects of prescribed medications  Outcome: Progressing     Problem: Depression  Goal: Treatment Goal: Demonstrate behavioral control of depressive symptoms, verbalize feelings of improved mood/affect, and adopt new coping skills prior to discharge  Outcome: Progressing     Problem: Depression  Goal: Verbalize thoughts and feelings  Description: Interventions:  - Assess and re-assess patient's level of risk   - Engage patient in 1:1 interactions, daily, for a minimum of 15 minutes   - Encourage patient to express feelings, fears, frustrations, hopes   Outcome: Progressing

## 2024-12-06 NOTE — PROGRESS NOTES
Progress Note - Behavioral Health   Name: Meli Nowak 59 y.o. female I MRN: [id number]  Unit/Bed#: [facility] 651-02 I Date of Admission: 7/23/2024   Date of Service: 12/6/2024 I Hospital Day: 136     Assessment & Plan  Schizoaffective disorder, bipolar type (HCC)  No medication changes today  Decrease Depakote to 250 mg nightly-12/6/2024  Most recent VPA level 54 on 10/12/24  CMP on 10/12/24 reviewed, WNL  Clozaril 900 mg nightly for schizoaffective disorder  Clozaril monitoring:   CRP, CBC/diff, CK QMon for monitoring  ANC 12/5/24- 4.18  Most recent ECG reviewed 9/9/24 - normal ECG, NSR  Clozaril increased to 900 mg nightly on 10/29/24 - Clozaril level 1076 on 12/5/24  Risperdal discontinued on 10/28/24 for moderate affectivity  Ativan changed to Klonopin 0.5 mg daily/ 1 mg qhs    Continue melatonin 3 mg nightly for insomnia  Continue prazosin 5 mg nightly for PTSD associated nightmares; doses to be adjusted as indicated   Continue trazodone 50 mg nightly for insomnia  Discharge disposition: LTSR        ASHLIE (obstructive sleep apnea)  - f/u w/ Sleep Medicine in outpatient setting and consider CPAP as indicated          Plan   Progress Toward Goals:   Meli is progressing towards goals of inpatient psychiatric treatment by continued medication compliance and is attending therapeutic modalities on the milieu. However, the patient continues to require inpatient psychiatric hospitalization for continued medication management and titration to optimize symptom reduction, improve sleep hygiene, and demonstrate adequate self-care.    Recommended Treatment: Treatment plan and medication changes discussed and per the attending physician the plan is:    1.Continue with group therapy, milieu therapy and occupational therapy  2.Behavioral Health checks every 7 minutes  3.Continue frequent safety checks and vitals per unit protocol  4.Continue with SLIM medical management as indicated  5.Continue with current medication  regimen  6.Will review labs in the a.m.  7.Disposition Planning: Discharge planning and efforts remain ongoing    Behavioral Health Medications: all current active meds have been reviewed and continue current psychiatric medications.  Current Facility-Administered Medications   Medication Dose Route Frequency Provider Last Rate    acetaminophen  650 mg Oral Q4H PRN Marie Ziegler, CRNP      acetaminophen  650 mg Oral Q4H PRN Marie Ziegler, CRNP      acetaminophen  975 mg Oral Q6H PRN Marie Ziegler, CRNP      albuterol  2 puff Inhalation Q4H PRN Kristen Logan, CRNP      aluminum-magnesium hydroxide-simethicone  30 mL Oral Q4H PRN Parris Bolanos, CRNP      Artificial Tears  1 drop Both Eyes Q6H PRN Dereje Banuelos MD      atorvastatin  10 mg Oral Daily Marie Ziegler, CRNP      bisacodyl  10 mg Oral Daily PRN Kristen Logan, CRNP      bisacodyl  10 mg Rectal Daily PRN Marie Ziegler, CRNP      carvedilol  6.25 mg Oral BID With Meals Marie Ziegler, CRNP      clonazePAM  0.5 mg Oral Daily Marie Ziegler, CRNP      clonazePAM  1 mg Oral HS Marie Ziegler, CRNP      cloZAPine  900 mg Oral HS Marie Ziegler, CRNP      cyanocobalamin  1,000 mcg Oral Daily Marie Ziegler, CRNP      Diclofenac Sodium  2 g Topical 4x Daily PRN Marie Ziegler, CRNP      famotidine  20 mg Oral BID Shan Fitzgerald, DO      fluticasone  1 puff Inhalation Daily Marie Ziegler, CRNP      hydrOXYzine HCL  25 mg Oral Q6H PRN Max 4/day Marie Ziegler, CRNP      hydrOXYzine HCL  50 mg Oral Q6H PRN Max 4/day Marie Ziegler, CRNP      LORazepam  1 mg Intramuscular Q6H PRN Max 3/day Marie Ziegler, CRNP      LORazepam  1 mg Oral Q6H PRN Max 3/day Marie Ziegler, CRNP      melatonin  3 mg Oral HS Marie Ziegler, CRNP      mirtazapine  7.5 mg Oral HS PRN Marie Ziegler, CRNP      Multivitamin  15 mL Oral Daily Marie Ziegler, CRNP      OLANZapine  2.5 mg Oral Q6H PRN Dereje Banuelos MD      Or    OLANZapine  2.5 mg  "Intramuscular Q6H PRN Dereje Banuelos MD      OLANZapine  5 mg Oral Q6H PRN Dereje Banuelos MD      Or    OLANZapine  5 mg Intramuscular Q6H PRN Dereje Banuelos MD      ondansetron  4 mg Oral Q6H PRN Darin Lawton MD      polyethylene glycol  17 g Oral Daily Kristen Ludwig Doreen, JOSE ELIAS      polyethylene glycol  17 g Oral Daily PRN JOSE ELIAS Figueroa      prazosin  5 mg Oral HS Marie Ziegler, JOSE ELIAS      senna-docusate sodium  2 tablet Oral HS Rehana Borrego PA-C      traZODone  50 mg Oral HS JOSE ELIAS Figueroa      valproic acid  500 mg Oral HS JOSE ELIAS Figueroa         Risks / Benefits of Treatment:  Risks, benefits, and possible side effects of medications explained to patient and patient verbalizes understanding and agreement for treatment.       Subjective    Patient was seen today for continuation of care, records reviewed and patient was discussed with the morning case review team.    Meli was seen today for psychiatric follow-up.  On assessment today, Meli was isolated in her room.  Currently seen lying under the covers, patient reports being \"tired \".  Depakote to be decreased to 250 mg for  daytime sedation.  Auditory hallucinations significantly decreased, patient does remain disheveled with poor hygiene requiring prompting from staff.  Paranoia chronic improving but patient remains suspicious at times.  Meli denies acute suicidal/self-harm ideation/intent/plan upon direct inquiry at this time.  Meli remains behaviorally appropriate, no agitation or aggression noted on exam or in report.   Impulse control is intact.  Meli remains adherent to her current psychotropic medication regimen and denies any side effects from medications, as well as none noted on exam.    Meli is currently assessed as being at their baseline with continued need for medication management, supervision for safety and ADL’s. These services are not currently available in a less restrictive environment necessitating " continued hospital stay.  Meli should remain on the unit until these services are available, due to likelihood of mental decompensation and readmission if discharged to an unsupervised setting.  Assertive discharge planning and collaboration with multiple providers (inpatient and community based) remains ongoing.  Meli is currently awaiting for CRR.     Psychiatric Review of Systems:  Behavior over the last 24 hours:  unchanged.   Sleep: good  Appetite: good  Medication side effects: none  ROS: no complaints, denies shortness of breath or chest pain and all other systems are negative for acute changes        Objective    Vitals:  Vitals:    12/06/24 0740   BP: 145/62   Pulse: 77   Resp: 18   Temp: 97.7 °F (36.5 °C)   SpO2: 97%       Laboratory Results:  I have personally reviewed all pertinent laboratory/tests results.  Most Recent Labs:   Lab Results   Component Value Date    WBC 8.69 12/04/2024    RBC 4.46 12/04/2024    HGB 14.3 12/04/2024    HCT 43.5 12/04/2024     12/04/2024    RDW 13.4 12/04/2024    NEUTROABS 4.18 12/04/2024    SODIUM 138 10/12/2024    K 4.1 10/12/2024     10/12/2024    CO2 29 10/12/2024    BUN 18 10/12/2024    CREATININE 0.97 10/12/2024    GLUC 146 (H) 10/12/2024    GLUF 98 08/10/2024    CALCIUM 9.2 10/12/2024    AST 12 (L) 10/12/2024    ALT 12 10/12/2024    ALKPHOS 84 10/12/2024    TP 6.7 10/12/2024    ALB 3.9 10/12/2024    TBILI 0.26 10/12/2024    VALPROICTOT 54 10/12/2024    AMMONIA 32 07/03/2024    [id number] 2.000 07/24/2024    HGBA1C 6.4 (H) 05/03/2024     05/03/2024       Mental Status Evaluation:  Appearance:  disheveled, marginal hygiene   Behavior:  cooperative, guarded   Speech:  normal rate and volume   Mood:  less anxious, less depressed   Affect:  constricted   Thought Process:  perseverative, concrete   Thought Content:  Voodoo and somatic preoccupation, paranoid ideation   Perceptual Disturbances: auditory hallucinations still present, but less frequent    Risk Potential: Suicidal ideation - None  Homicidal ideation - None  Potential for aggression - No   Memory:  recent and remote memory grossly intact   Sensorium  person, place, and time/date      Consciousness:  alert and awake   Attention: attention span and concentration are age appropriate   Insight:  limited   Judgment: limited   Gait/Station: normal gait/station   Motor Activity: no abnormal movements       Counseling / Coordination of Care:  Patient's progress reviewed with nursing staff.  Medications, treatment progress and treatment plan reviewed with patient.  Supportive counseling provided to the patient.    This note was completed in part utilizing Dragon dictation Software. Grammatical, translation, syntax errors, random word insertions, spelling mistakes, and incomplete sentences may be an occasional consequence of this system secondary to software limitations with voice recognition, ambient noise, and hardware issues. If you have any questions or concerns about the content, text, or information contained within the body of this dictation, please contact the provider for clarification

## 2024-12-06 NOTE — NURSING NOTE
Patient visible on the unit for meals then isolates back to her room and is seen lying in bed most of this shift. She endorses anxiety and although she denies, appears to be slightly preoccupied internally. She is medication compliant. Encouraged to inform staff of any needs or concerns.

## 2024-12-06 NOTE — CONSULTS
Consultation - Neuropsychology/Psychology Department  Meli Nowak 59 y.o. female MRN: [id number]  Unit/Bed#: [facility] 651-02 Encounter: [id number]        Reason for Consultation:  Meli Nowak is a 59 y.o. year old female who was referred for a Neuropsychological Exam to assess cognitive functioning and comment on capacity to make informed medical decisions.      History of Present Illness  Depression , anxiety with suicidal ideation  Physician Requesting Consult: Dereje Banuelos MD    PROBLEM LIST:  Patient Active Problem List   Diagnosis    Medical clearance for psychiatric admission    Type 2 diabetes mellitus (HCC)    Obesity    Psychosis (HCC)    Tobacco abuse    Hyperlipidemia    Schizoaffective disorder, bipolar type (HCC)    Altered mental status    Agoraphobia with panic disorder    Arthritis    Diastasis recti    Endometrial cancer (HCC)    Esophageal reflux    Hepatic steatosis    HSV-2 infection    Incisional hernia, without obstruction or gangrene    Incontinence of urine in female    Polycystic ovaries    Postmenopausal bleeding    Posttraumatic stress disorder    Right buttock pain    Catatonia    Essential (primary) hypertension    Vitamin B12 deficiency    Hypoxia    Ambulatory dysfunction    Mild protein-calorie malnutrition (HCC)    ASHLIE (obstructive sleep apnea)         Historical Information   Past Medical History:   Diagnosis Date    Cognitive impairment     Diabetes mellitus (HCC)     Essential (primary) hypertension     Psychosis (HCC)      Past Surgical History:   Procedure Laterality Date     SECTION      CHOLECYSTECTOMY       Social History   Social History     Substance and Sexual Activity   Alcohol Use Not Currently    Comment: Unable to determine     Social History     Substance and Sexual Activity   Drug Use No     Social History     Tobacco Use   Smoking Status Every Day    Current packs/day: 0.50    Types: Cigarettes   Smokeless Tobacco Never     Family History: History  reviewed. No pertinent family history.    Meds/Allergies   current meds:   Current Facility-Administered Medications:     acetaminophen (TYLENOL) tablet 650 mg, Q4H PRN    acetaminophen (TYLENOL) tablet 650 mg, Q4H PRN    acetaminophen (TYLENOL) tablet 975 mg, Q6H PRN    albuterol (PROVENTIL HFA,VENTOLIN HFA) inhaler 2 puff, Q4H PRN    aluminum-magnesium hydroxide-simethicone (MAALOX) oral suspension 30 mL, Q4H PRN    Artificial Tears Op Soln 1 drop, Q6H PRN    atorvastatin (LIPITOR) tablet 10 mg, Daily    bisacodyl (DULCOLAX) EC tablet 10 mg, Daily PRN    bisacodyl (DULCOLAX) rectal suppository 10 mg, Daily PRN    carvedilol (COREG) tablet 6.25 mg, BID With Meals    clonazePAM (KlonoPIN) tablet 0.5 mg, Daily    clonazePAM (KlonoPIN) tablet 1 mg, HS    cloZAPine (CLOZARIL) tablet 900 mg, HS    cyanocobalamin (VITAMIN B-12) tablet 1,000 mcg, Daily    Diclofenac Sodium (VOLTAREN) 1 % topical gel 2 g, 4x Daily PRN    famotidine (PEPCID) tablet 20 mg, BID    fluticasone (ARNUITY ELLIPTA) 100 MCG/ACT inhaler 1 puff, Daily    hydrOXYzine HCL (ATARAX) tablet 25 mg, Q6H PRN Max 4/day    hydrOXYzine HCL (ATARAX) tablet 50 mg, Q6H PRN Max 4/day    LORazepam (ATIVAN) injection 1 mg, Q6H PRN Max 3/day    LORazepam (ATIVAN) tablet 1 mg, Q6H PRN Max 3/day    melatonin tablet 3 mg, HS    mirtazapine (REMERON) tablet 7.5 mg, HS PRN    Multivitamin liquid 15 mL, Daily    OLANZapine (ZyPREXA) tablet 2.5 mg, Q6H PRN **OR** OLANZapine (ZyPREXA) IM injection 2.5 mg, Q6H PRN    OLANZapine (ZyPREXA) tablet 5 mg, Q6H PRN **OR** OLANZapine (ZyPREXA) IM injection 5 mg, Q6H PRN    ondansetron (ZOFRAN-ODT) dispersible tablet 4 mg, Q6H PRN    polyethylene glycol (MIRALAX) packet 17 g, Daily    polyethylene glycol (MIRALAX) packet 17 g, Daily PRN    prazosin (MINIPRESS) capsule 5 mg, HS    senna-docusate sodium (SENOKOT S) 8.6-50 mg per tablet 2 tablet, HS    traZODone (DESYREL) tablet 50 mg, HS    valproic acid (DEPAKENE) oral soln 500 mg,  HS    No Known Allergies      Family and Social Support:   No data recorded    Behavioral Observations: Patient was alert, oriented x 3, spontaneous, and cooperative; affect appeared pleasant and patient denied depressed mood and admitted to anxiety; denied suicidal ideation/plan/intent; no overt evidence of psychotic process; patient reported she wishes to return home to be with her cats.    Cognitive Examination    General Cognitive Functioning MMSE = [id number] with deficits in recall and working memory;     Attention/Concentration Auditory Selective Attention = Within Normal Limits; Auditory Vigilance = Within Normal Limits; Information Processing Speed = Within Normal Limits    Frontal Systems/Executive Functioning Mental Flexibility/Cognitive Control = Borderline; Working Memory = Impaired Abstract Reasoning = Within Normal Limits;  Generative Ability = Impaired,     Language Functioning Confrontation naming = Within Normal Limits, Phonemic Fluency = Impaired; Semantic Retrieval = Impaired; Comprehension of Complex Ideational Material = Within Normal Limits; Praxis = Within Normal Limits; Repetition = Within Normal Limits; Basic Reading = Within Normal Limits; Following Commands = Within Normal Limits    Memory Functioning Narrative Recall - Short Delay = Severely Impaired; Long Delay Narrative Recall = Severely Impaired; three word recall = Impaired    Visuo-Spatial Abilities Not Assessed    Functional Knowledge  Health & Safety Knowledge = Within Normal Limits;     Summary/Impression:  Results of Neuropsychological Exam revealed severe deficits in declarative memory, in addition to deficits in word fluency/generative ability, semantic retrieval, and working memory. On a measure assessing awareness of personal health status and ability to evaluate health problems, handle medial emergencies and take safety precautions, patient performed within normal limits. During this encounter, patient appears to have  capacity to make informed medical decisions. Patient will benefit from supportive services on tasks/activities dependent on memory functioning.

## 2024-12-06 NOTE — ASSESSMENT & PLAN NOTE
No medication changes today  Decrease Depakote to 250 mg nightly-12/6/2024  Most recent VPA level 54 on 10/12/24  CMP on 10/12/24 reviewed, WNL  Clozaril 900 mg nightly for schizoaffective disorder  Clozaril monitoring:   CRP, CBC/diff, CK QMon for monitoring  ANC 12/5/24- 4.18  Most recent ECG reviewed 9/9/24 - normal ECG, NSR  Clozaril increased to 900 mg nightly on 10/29/24 - Clozaril level 1076 on 12/5/24  Risperdal discontinued on 10/28/24 for moderate affectivity  Ativan changed to Klonopin 0.5 mg daily/ 1 mg qhs    Continue melatonin 3 mg nightly for insomnia  Continue prazosin 5 mg nightly for PTSD associated nightmares; doses to be adjusted as indicated   Continue trazodone 50 mg nightly for insomnia  Discharge disposition: LTSR

## 2024-12-06 NOTE — TREATMENT TEAM
12/06/24 0900   Team Meeting   Meeting Type Daily Rounds   Team Members Present   Team Members Present Physician;Nurse;;;Occupational Therapist   Physician Team Member Dr. Banuelos / Dr. Hurst / Dr. Bland / JAIR Ziegler   Nursing Team Member Jason   Care Management Team Member Manju / Tee   Social Work Team Member Anastacio   Patient/Family Present   Patient Present No   Patient's Family Present No     Pt is reported to have eaten 100%, slept, attended one group.

## 2024-12-07 PROCEDURE — 99232 SBSQ HOSP IP/OBS MODERATE 35: CPT | Performed by: STUDENT IN AN ORGANIZED HEALTH CARE EDUCATION/TRAINING PROGRAM

## 2024-12-07 RX ADMIN — VALPROIC ACID 250 MG: 500 SOLUTION ORAL at 21:29

## 2024-12-07 RX ADMIN — FAMOTIDINE 20 MG: 20 TABLET ORAL at 09:08

## 2024-12-07 RX ADMIN — POLYETHYLENE GLYCOL 3350 17 G: 17 POWDER, FOR SOLUTION ORAL at 09:09

## 2024-12-07 RX ADMIN — CLOZAPINE 900 MG: 200 TABLET ORAL at 21:23

## 2024-12-07 RX ADMIN — CYANOCOBALAMIN TAB 1000 MCG 1000 MCG: 1000 TAB at 09:08

## 2024-12-07 RX ADMIN — CARVEDILOL 6.25 MG: 6.25 TABLET, FILM COATED ORAL at 16:27

## 2024-12-07 RX ADMIN — MELATONIN TAB 3 MG 3 MG: 3 TAB at 21:27

## 2024-12-07 RX ADMIN — CLONAZEPAM 0.5 MG: 0.5 TABLET ORAL at 09:08

## 2024-12-07 RX ADMIN — FLUTICASONE FUROATE 1 PUFF: 100 POWDER RESPIRATORY (INHALATION) at 09:11

## 2024-12-07 RX ADMIN — PRAZOSIN HYDROCHLORIDE 5 MG: 5 CAPSULE ORAL at 21:25

## 2024-12-07 RX ADMIN — SENNOSIDES AND DOCUSATE SODIUM 2 TABLET: 8.6; 5 TABLET ORAL at 21:22

## 2024-12-07 RX ADMIN — FAMOTIDINE 20 MG: 20 TABLET ORAL at 17:09

## 2024-12-07 RX ADMIN — ATORVASTATIN CALCIUM 10 MG: 10 TABLET, FILM COATED ORAL at 09:08

## 2024-12-07 RX ADMIN — Medication 15 ML: at 09:08

## 2024-12-07 RX ADMIN — ACETAMINOPHEN 975 MG: 325 TABLET, FILM COATED ORAL at 09:36

## 2024-12-07 RX ADMIN — CLONAZEPAM 1 MG: 1 TABLET ORAL at 21:23

## 2024-12-07 RX ADMIN — CARVEDILOL 6.25 MG: 6.25 TABLET, FILM COATED ORAL at 09:08

## 2024-12-07 RX ADMIN — ALBUTEROL SULFATE 2 PUFF: 90 AEROSOL, METERED RESPIRATORY (INHALATION) at 21:37

## 2024-12-07 RX ADMIN — TRAZODONE HYDROCHLORIDE 50 MG: 50 TABLET ORAL at 21:36

## 2024-12-07 NOTE — NURSING NOTE
"During medication administration, patient appeared flustered and anxious. Upon receiving her medications patient stated \"I don't want to take all of these medications. It's more and more every day. I won't take them. You have to get out of my room. I have to finish my prayers. Get out. My day is ruined for the rest of my life.\" Patient's speech pattern was rapid. When attempting to assess why she was feeling this way patient stated \"Get out of my room. I don't want to talk to you.\" Patient redirected d/t nature of how she was speaking to staff to which responded to by laying down in bed, facing the wall.   "

## 2024-12-07 NOTE — PLAN OF CARE
Problem: Prexisting or High Potential for Compromised Skin Integrity  Goal: Skin integrity is maintained or improved  Description: INTERVENTIONS:  - Identify patients at risk for skin breakdown  - Assess and monitor skin integrity  - Assess and monitor nutrition and hydration status  - Monitor labs   - Assess for incontinence   - Turn and reposition patient  - Assist with mobility/ambulation  - Relieve pressure over bony prominences  - Avoid friction and shearing  - Provide appropriate hygiene as needed including keeping skin clean and dry  - Evaluate need for skin moisturizer/barrier cream  - Collaborate with interdisciplinary team   - Patient/family teaching  - Consider wound care consult   Outcome: Progressing     Problem: Alteration in Thoughts and Perception  Goal: Treatment Goal: Gain control of psychotic behaviors/thinking, reduce/eliminate presenting symptoms and demonstrate improved reality functioning upon discharge  Outcome: Progressing  Goal: Verbalize thoughts and feelings  Description: Interventions:  - Promote a nonjudgmental and trusting relationship with the patient through active listening and therapeutic communication  - Assess patient's level of functioning, behavior and potential for risk  - Engage patient in 1 on 1 interactions  - Encourage patient to express fears, feelings, frustrations, and discuss symptoms    - [place] patient to reality, help patient recognize reality-based thinking   - Administer medications as ordered and assess for potential side effects  - Provide the patient education related to the signs and symptoms of the illness and desired effects of prescribed medications  Outcome: Progressing  Goal: Refrain from acting on delusional thinking/internal stimuli  Description: Interventions:  - Monitor patient closely, per order   - Utilize least restrictive measures   - Set reasonable limits, give positive feedback for acceptable   - Administer medications as ordered and monitor  of potential side effects  Outcome: Progressing  Goal: Agree to be compliant with medication regime, as prescribed and report medication side effects  Description: Interventions:  - Offer appropriate PRN medication and supervise ingestion; conduct AIMS, as needed   Outcome: Progressing  Goal: Attend and participate in unit activities, including therapeutic, recreational, and educational groups  Description: Interventions:  -Encourage Visitation and family involvement in care  Outcome: Progressing  Goal: Recognize dysfunctional thoughts, communicate reality-based thoughts at the time of discharge  Description: Interventions:  - Provide medication and psycho-education to assist patient in compliance and developing insight into his/her illness   Outcome: Progressing  Goal: Complete daily ADLs, including personal hygiene independently, as able  Description: Interventions:  - Observe, teach, and assist patient with ADLS  - Monitor and promote a balance of rest/activity, with adequate nutrition and elimination   Outcome: Progressing     Problem: Ineffective Coping  Goal: Identifies ineffective coping skills  Outcome: Progressing  Goal: Demonstrates healthy coping skills  Outcome: Progressing  Goal: Participates in unit activities  Description: Interventions:  - Provide therapeutic environment   - Provide required programming   - Redirect inappropriate behaviors   Outcome: Progressing  Goal: Patient/Family participate in treatment and DC plans  Description: Interventions:  - Provide therapeutic environment  Outcome: Progressing  Goal: Patient/Family verbalizes awareness of resources  Outcome: Progressing  Goal: Understands least restrictive measures  Description: Interventions:  - Utilize least restrictive behavior  Outcome: Progressing  Goal: Free from restraint events  Description: - Utilize least restrictive measures   - Provide behavioral interventions   - Redirect inappropriate behaviors   Outcome: Progressing      Problem: Risk for Self Injury/Neglect  Goal: Treatment Goal: Remain safe during length of stay, learn and adopt new coping skills, and be free of self-injurious ideation, impulses and acts at the time of discharge  Outcome: Progressing  Goal: Verbalize thoughts and feelings  Description: Interventions:  - Assess and re-assess patient's lethality and potential for self-injury  - Engage patient in 1:1 interactions, daily, for a minimum of 15 minutes  - Encourage patient to express feelings, fears, frustrations, hopes  - Establish rapport/trust with patient   Outcome: Progressing  Goal: Refrain from harming self  Description: Interventions:  - Monitor patient closely, per order  - Develop a trusting relationship  - Supervise medication ingestion, monitor effects and side effects   Outcome: Progressing  Goal: Attend and participate in unit activities, including therapeutic, recreational, and educational groups  Description: Interventions:  - Provide therapeutic and educational activities daily, encourage attendance and participation, and document same in the medical record  - Obtain collateral information, encourage visitation and family involvement in care   Outcome: Progressing  Goal: Recognize maladaptive responses and adopt new coping mechanisms  Outcome: Progressing     Problem: Depression  Goal: Treatment Goal: Demonstrate behavioral control of depressive symptoms, verbalize feelings of improved mood/affect, and adopt new coping skills prior to discharge  Outcome: Progressing  Goal: Verbalize thoughts and feelings  Description: Interventions:  - Assess and re-assess patient's level of risk   - Engage patient in 1:1 interactions, daily, for a minimum of 15 minutes   - Encourage patient to express feelings, fears, frustrations, hopes   Outcome: Progressing  Goal: Refrain from isolation  Description: Interventions:  - Develop a trusting relationship   - Encourage socialization   Outcome: Progressing  Goal: Refrain  from self-neglect  Outcome: Progressing  Goal: Attend and participate in unit activities, including therapeutic, recreational, and educational groups  Description: Interventions:  - Provide therapeutic and educational activities daily, encourage attendance and participation, and document same in the medical record   Outcome: Progressing     Problem: Anxiety  Goal: Anxiety is at manageable level  Description: Interventions:  - Assess and monitor patient's anxiety level.   - Monitor for signs and symptoms (heart palpitations, chest pain, shortness of breath, headaches, nausea, feeling jumpy, restlessness, irritable, apprehensive).   - Collaborate with interdisciplinary team and initiate plan and interventions as ordered.  - [place] patient to unit/surroundings  - Explain treatment plan  - Encourage participation in care  - Encourage verbalization of concerns/fears  - Identify coping mechanisms  - Assist in developing anxiety-reducing skills  - Administer/offer alternative therapies  - Limit or eliminate stimulants  Outcome: Progressing     Problem: SAFETY,RESTRAINT: NV/NON-SELF DESTRUCTIVE BEHAVIOR  Goal: Remains free of harm/injury (restraint for non violent/non self-detsructive behavior)  Description: INTERVENTIONS:  - Instruct patient/family regarding restraint use   - Assess and monitor physiologic and psychological status   - Provide interventions and comfort measures to meet assessed patient needs   - Identify and implement measures to help patient regain control  - Assess readiness for release of restraint   Outcome: Progressing  Goal: Returns to optimal restraint-free functioning  Description: INTERVENTIONS:  - Assess the patient's behavior and symptoms that indicate continued need for restraint  - Identify and implement measures to help patient regain control  - Assess readiness for release of restraint   Outcome: Progressing     Problem: Potential for Falls  Goal: Patient will remain free of  falls  Description: INTERVENTIONS:  - Educate patient on patient safety including physical limitations  - Instruct patient to call for assistance with activity   - Consult OT/PT to assist with strengthening/mobility   - Keep Call bell within reach  - Keep bed low and locked with side rails adjusted as appropriate  - Keep care items and personal belongings within reach  - Initiate and maintain comfort rounds  - Offer Toileting every 2 Hours, in advance of need  - Initiate/Maintain bed and chair alarm  - Obtain necessary fall risk management equipment: walker, wheelchair  - Apply yellow socks and bracelet for high fall risk patients  - Patient moved to room near nurses station  Outcome: Progressing     Problem: Nutrition/Hydration-ADULT  Goal: Nutrient/Hydration intake appropriate for improving, restoring or maintaining nutritional needs  Description: Monitor and assess patient's nutrition/hydration status for malnutrition. Collaborate with interdisciplinary team and initiate plan and interventions as ordered.  Monitor patient's weight and dietary intake as ordered or per policy. Utilize nutrition screening tool and intervene as necessary. Determine patient's food preferences and provide high-protein, high-caloric foods as appropriate.     INTERVENTIONS:  - Monitor oral intake, urinary output, labs, and treatment plans  - Assess nutrition and hydration status and recommend course of action  - Evaluate amount of meals eaten  - Assist patient with eating if necessary   - Allow adequate time for meals  - Recommend/ encourage appropriate diets, oral nutritional supplements, and vitamin/mineral supplements  - Order, calculate, and assess calorie counts as needed  - Recommend, monitor, and adjust tube feedings and TPN/PPN based on assessed needs  - Assess need for intravenous fluids  - Provide specific nutrition/hydration education as appropriate  - Include patient/family/caregiver in decisions related to  nutrition  Outcome: Progressing

## 2024-12-07 NOTE — NURSING NOTE
"Patient AA&O. C/O 8/10 back pain. PRN Tylenol 975 mg given at 0936. Patient endorses anxiety due to pain. States, \"This bed is hurting my back.\" Denies depression, SI/HI/AVH. Pleasant and cooperative with medications and care. No other unmet needs at this time.  "

## 2024-12-07 NOTE — ASSESSMENT & PLAN NOTE
No medication changes today  Decrease Depakote to 250 mg nightly-12/6/2024  Most recent VPA level 54 on 10/12/24  CMP on 10/12/24 reviewed, WNL  Clozaril 900 mg nightly for schizoaffective disorder  Clozaril monitoring:   CRP, CBC/diff, CK QMon for monitoring  ANC 12/5/24- 4.18  Most recent ECG reviewed 9/9/24 - normal ECG, NSR  Clozaril increased to 900 mg nightly on 10/29/24 - Clozaril level 1076 on 12/5/24 - will continue to monitor the level  Risperdal discontinued on 10/28/24 for moderate affectivity  Ativan changed to Klonopin 0.5 mg daily/ 1 mg qhs    Continue melatonin 3 mg nightly for insomnia  Continue prazosin 5 mg nightly for PTSD associated nightmares; doses to be adjusted as indicated   Continue trazodone 50 mg nightly for insomnia  Discharge disposition: LTSR vs. Home w/ services (meeting with ICM on 12/9/24)

## 2024-12-07 NOTE — PROGRESS NOTES
Progress Note - Behavioral Health   Name: Meli Nowka 59 y.o. female I MRN: [id number]  Unit/Bed#: [facility] 651-02 I Date of Admission: 7/23/2024   Date of Service: 12/7/2024 I Hospital Day: 137     Assessment & Plan  Schizoaffective disorder, bipolar type (HCC)  No medication changes today  Decrease Depakote to 250 mg nightly-12/6/2024  Most recent VPA level 54 on 10/12/24  CMP on 10/12/24 reviewed, WNL  Clozaril 900 mg nightly for schizoaffective disorder  Clozaril monitoring:   CRP, CBC/diff, CK QMon for monitoring  ANC 12/5/24- 4.18  Most recent ECG reviewed 9/9/24 - normal ECG, NSR  Clozaril increased to 900 mg nightly on 10/29/24 - Clozaril level 1076 on 12/5/24 - will continue to monitor the level  Risperdal discontinued on 10/28/24 for moderate affectivity  Ativan changed to Klonopin 0.5 mg daily/ 1 mg qhs    Continue melatonin 3 mg nightly for insomnia  Continue prazosin 5 mg nightly for PTSD associated nightmares; doses to be adjusted as indicated   Continue trazodone 50 mg nightly for insomnia  Discharge disposition: LTSR vs. Home w/ services (meeting with ICM on 12/9/24)        ASHLIE (obstructive sleep apnea)  - f/u w/ Sleep Medicine in outpatient setting and consider CPAP as indicated          Plan   Progress Toward Goals: mood is stabilizing, working on coping skills, discharge planning    Recommended Treatment:    - Encourage early mobility and having a structured day  - Provide frequent re-orientation, and cognitive stimulation  - Ensure assistive devices are in proper working order (eye-glasses, hearing aids)  - Encourage adequate hydration, nutrition and monitor bowel movements  - Maintain sleep-wake cycle: Uninterrupted sleep time; low-level lighting at night  - Fall precaution  - f/u [facility]IM recs regarding the medical problems   - Weekly ANC on Monday  - Continue medication titration and treatment plan; adjust medication to optimize treatment response and as clinically indicated. .       Current  medications:  Current Facility-Administered Medications   Medication Dose Route Frequency Provider Last Rate    acetaminophen  650 mg Oral Q4H PRN Marie Ziegler, CRNP      acetaminophen  650 mg Oral Q4H PRN Marie Ziegler, CRNP      acetaminophen  975 mg Oral Q6H PRN Marie Ziegler, CRNP      albuterol  2 puff Inhalation Q4H PRN Kristen Logan, CRNP      aluminum-magnesium hydroxide-simethicone  30 mL Oral Q4H PRN Parris Bolanos, CRNP      Artificial Tears  1 drop Both Eyes Q6H PRN Dereje Banuelos MD      atorvastatin  10 mg Oral Daily Marie Ziegler, CRNP      bisacodyl  10 mg Oral Daily PRN Kristen Logan, CRNP      bisacodyl  10 mg Rectal Daily PRN Marie Ziegler, CRNP      carvedilol  6.25 mg Oral BID With Meals Marie Ziegler, CRNP      clonazePAM  0.5 mg Oral Daily Marie Ziegler, CRNP      clonazePAM  1 mg Oral HS Marie Ziegler, CRNP      cloZAPine  900 mg Oral HS Marei Ziegler, CRNP      cyanocobalamin  1,000 mcg Oral Daily Marie Ziegler, CRNP      Diclofenac Sodium  2 g Topical 4x Daily PRN Marie Ziegler, CRNP      famotidine  20 mg Oral BID Shan Fitzgerald, DO      fluticasone  1 puff Inhalation Daily Marie Ziegler, CRNP      hydrOXYzine HCL  25 mg Oral Q6H PRN Max 4/day Marie Ziegler, CRNP      hydrOXYzine HCL  50 mg Oral Q6H PRN Max 4/day Marie Ziegler, CRNP      LORazepam  1 mg Intramuscular Q6H PRN Max 3/day Marie Ziegler, CRNP      LORazepam  1 mg Oral Q6H PRN Max 3/day Marie Ziegler, CRNP      melatonin  3 mg Oral HS Marie Ziegler, CRNP      mirtazapine  7.5 mg Oral HS PRN Marie Ziegler, CRNP      Multivitamin  15 mL Oral Daily Marie Ziegler, CRNP      OLANZapine  2.5 mg Oral Q6H PRN Dereje Banuelos MD      Or    OLANZapine  2.5 mg Intramuscular Q6H PRN Dereje Banuelos MD      OLANZapine  5 mg Oral Q6H PRN Dereje Banuelos MD      Or    OLANZapine  5 mg Intramuscular Q6H PRN Dereje Banuelos MD      ondansetron  4 mg Oral Q6H PRN Darin Lawton MD       "polyethylene glycol  17 g Oral Daily Kristen Logan, JOSE ELIAS      polyethylene glycol  17 g Oral Daily PRN JOSE ELIAS Figueroa      prazosin  5 mg Oral HS JOSE ELIAS Figueroa      senna-docusate sodium  2 tablet Oral HS Rehana Borrego PA-C      traZODone  50 mg Oral HS JOSE ELIAS Figueroa      valproic acid  250 mg Oral HS JOSE ELIAS Figueroa          - Observation: routine            - VS: as per unit protocol  - Encourage group attendance and milieu therapy  - Dispo: To be determined   - Estimated Discharge Date: 12/31/2024     Subjective     Patient was visited on unit for continuing care; chart reviewed and discussed with the nursing staff.  On approach, the patient was calm and superficially cooperative. Endorsed better mood but continues to have negative thinking and ruminating thoughts. Reported feeling anxious about going back home and noted that she does not know what to do if feels anxious or having a panic attack when goes home alone. Denied any changes in appetite, and energy level. No problem initiating and maintaining sleep.  Denied A/VH currently.  Denied SI/HI, intent or plan upon direct inquiry at this time.    Patient continues to be intermittently visible in the milieu and interacts with select staff and peers. No reports of aggression or self-injurious behavior on unit. No PRN medications used in the past 24 hours. As per nursing report, the patient was anxious and flustered last night during medication administration, stated: \"I don't want to take all of these medications\" and was irritable, but verbally redirectable and took the meds with redirection and encouragement. Weekly ANC on Mondays. The patient has to remain in the hospital while awaiting placement as their mental illness does not allow for them to be treated at a lower level of care. Case management is assertively/actively working on securing the necessary level of care. Meeting with [facility] on Monday to discuss dispo " plan and services.    Objective    Current Mental Status Evaluation:  Appearance and attitude: appeared as stated age, dressed in hospital attire, with fair hygiene  Eye contact: good  Motor Function: within normal limits, No PMA/PMR  Gait/station: Not observed  Speech: talking in soft tone, with normal latency and amount  Language: No overt abnormality  Mood/affect: anxious / Affect was constricted but reactive, mood congruent  Thought Processes: concrete, perseverative  Thought content: denied suicidal ideations or homicidal ideations, no overt delusions elicited, negative thinking, ruminations, preoccupied with meds  Associations: concrete associations, perseverative  Perceptual disturbances: denies Auditory/Visual/Tactile Hallucinations  Orientation: oriented to time, person, place and to the situational context  Cognitive Function: intact  Memory: not cooperative with formal MMSE  Fund of knowledge: aware of current events, aware of past history, and vocabulary average  Impulse control: good  Insight/judgment: limited/limited          Vital signs in last 24 hours:    Temp:  [97.6 °F (36.4 °C)-98.2 °F (36.8 °C)] 97.6 °F (36.4 °C)  HR:  [75-86] 83  BP: (113-130)/(60-74) 113/60  Resp:  [15-19] 15  SpO2:  [94 %-95 %] 95 %  O2 Device: None (Room air)      Lab results: I have personally reviewed all pertinent laboratory/tests results      Counseling / Coordination of Care  Patient's progress reviewed with nursing staff.  Medications, treatment progress and treatment plan reviewed with patient.  Medication education provided to patient.  Supportive therapy provided to patient.  Reassurance and supportive therapy provided.  Reoriented to reality and reassured.  Encouraged participation in milieu and group therapy on the unit.  Crisis/safety plan discussed with patient.  Discharge plan discussed with patient.       Dereje Banuelos MD  Attending Psychiatrist   [facility]       This note was  completed in part utilizing Dragon dictation Software. Grammatical, translation, syntax errors, random word insertions, spelling mistakes, and incomplete sentences may be an occasional consequence of this system secondary to software limitations with voice recognition, ambient noise, and hardware issues. If you have any questions or concerns about the content, text, or information contained within the body of this dictation, please contact the provider for clarification.

## 2024-12-08 PROCEDURE — 99232 SBSQ HOSP IP/OBS MODERATE 35: CPT | Performed by: NURSE PRACTITIONER

## 2024-12-08 RX ADMIN — CLONAZEPAM 0.5 MG: 0.5 TABLET ORAL at 08:42

## 2024-12-08 RX ADMIN — ATORVASTATIN CALCIUM 10 MG: 10 TABLET, FILM COATED ORAL at 08:42

## 2024-12-08 RX ADMIN — CARVEDILOL 6.25 MG: 6.25 TABLET, FILM COATED ORAL at 07:49

## 2024-12-08 RX ADMIN — CLONAZEPAM 1 MG: 1 TABLET ORAL at 21:23

## 2024-12-08 RX ADMIN — Medication 2 G: at 14:34

## 2024-12-08 RX ADMIN — FAMOTIDINE 20 MG: 20 TABLET ORAL at 17:05

## 2024-12-08 RX ADMIN — Medication 15 ML: at 08:42

## 2024-12-08 RX ADMIN — CARVEDILOL 6.25 MG: 6.25 TABLET, FILM COATED ORAL at 16:20

## 2024-12-08 RX ADMIN — CLOZAPINE 900 MG: 200 TABLET ORAL at 21:22

## 2024-12-08 RX ADMIN — FLUTICASONE FUROATE 1 PUFF: 100 POWDER RESPIRATORY (INHALATION) at 08:43

## 2024-12-08 RX ADMIN — PRAZOSIN HYDROCHLORIDE 5 MG: 5 CAPSULE ORAL at 21:23

## 2024-12-08 RX ADMIN — TRAZODONE HYDROCHLORIDE 50 MG: 50 TABLET ORAL at 21:23

## 2024-12-08 RX ADMIN — VALPROIC ACID 250 MG: 500 SOLUTION ORAL at 21:27

## 2024-12-08 RX ADMIN — POLYETHYLENE GLYCOL 3350 17 G: 17 POWDER, FOR SOLUTION ORAL at 08:43

## 2024-12-08 RX ADMIN — CYANOCOBALAMIN TAB 1000 MCG 1000 MCG: 1000 TAB at 08:42

## 2024-12-08 RX ADMIN — SENNOSIDES AND DOCUSATE SODIUM 2 TABLET: 8.6; 5 TABLET ORAL at 21:23

## 2024-12-08 RX ADMIN — MELATONIN TAB 3 MG 3 MG: 3 TAB at 21:23

## 2024-12-08 RX ADMIN — ACETAMINOPHEN 975 MG: 325 TABLET, FILM COATED ORAL at 10:02

## 2024-12-08 RX ADMIN — FAMOTIDINE 20 MG: 20 TABLET ORAL at 08:42

## 2024-12-08 NOTE — NURSING NOTE
Pt. Was complaint with medications. Pt. Cooperative and pleasant.  Pt. Denied SI/HI, and reported anxiety and depression are improving. Pt. Stated that her bed is uncomfortable. No issues to report.

## 2024-12-08 NOTE — PROGRESS NOTES
"Progress Note - Behavioral Health   Name: Meli Nowak 59 y.o. female I MRN: [id number]  Unit/Bed#: [facility] 651-02 I Date of Admission: 7/23/2024   Date of Service: 12/8/2024 I Hospital Day: 138     Assessment & Plan  Schizoaffective disorder, bipolar type (HCC)  No medication changes today  Decrease Depakote to 250 mg nightly-12/6/2024  Most recent VPA level 54 on 10/12/24  CMP on 10/12/24 reviewed, WNL  Clozaril 900 mg nightly for schizoaffective disorder  Clozaril monitoring:   CRP, CBC/diff, CK QMon for monitoring  ANC 12/5/24- 4.18  Most recent ECG reviewed 9/9/24 - normal ECG, NSR  Clozaril increased to 900 mg nightly on 10/29/24 - Clozaril level 1076 on 12/5/24 - will continue to monitor the level  Risperdal discontinued on 10/28/24 for moderate affectivity  Ativan changed to Klonopin 0.5 mg daily/ 1 mg qhs    Continue melatonin 3 mg nightly for insomnia  Continue prazosin 5 mg nightly for PTSD associated nightmares; doses to be adjusted as indicated   Continue trazodone 50 mg nightly for insomnia  Discharge disposition: LTSR vs. Home w/ services (meeting with ICM on 12/9/24)        ASHLIE (obstructive sleep apnea)  - f/u w/ Sleep Medicine in outpatient setting and consider CPAP as indicated        Progress Toward Goals: Stabilizing mood.    Recommended Treatment: Continue with group therapy, milieu therapy and occupational therapy.      Risks, benefits and possible side effects of Medications:   Risks, benefits, and possible side effects of medications explained to patient and patient verbalizes understanding.      History of Present Illness   Behavior over the last 24 hours:  unchanged  Sleep: normal  Appetite: normal  Medication side effects: No  ROS: no complaints    Subjective: Patient was seen for continuing care and treatment.  Case was discussed with the nursing staff.  On examination today, the patient is seen lying in her bed.  She is complaining of leg pain because of \"the bed \".  I was able to view " the leg and her leg is not reddened and not swollen and she is in no distress.  Continues with a negative thinking and ruminating thoughts and perseverating.  Continue current meds and treatment.  Discharge planning and disposition is ongoing.    Objective   Mental Status Evaluation:  Appearance:  casually dressed and disheveled   Behavior:  Cooperative   Speech:  normal pitch and normal volume   Mood:  irritable and labile   Affect:  constricted   Thought Process:  disorganized and illogical   Associations: concrete associations   Thought Content:  No overt delusions   Perceptual Disturbances: None   Risk Potential: Suicidal Ideations none  Homicidal Ideations none  Potential for Aggression No   Sensorium:  person, place, and time/date   Memory:  recent and remote memory grossly intact   Consciousness:  alert and awake    Attention: attention span appeared shorter than expected for age   Insight:  limited   Judgment: limited   Gait/Station: normal gait/station   Motor Activity: no abnormal movements     Medications: current meds:   Current Facility-Administered Medications:     acetaminophen (TYLENOL) tablet 650 mg, Q4H PRN    acetaminophen (TYLENOL) tablet 650 mg, Q4H PRN    acetaminophen (TYLENOL) tablet 975 mg, Q6H PRN    albuterol (PROVENTIL HFA,VENTOLIN HFA) inhaler 2 puff, Q4H PRN    aluminum-magnesium hydroxide-simethicone (MAALOX) oral suspension 30 mL, Q4H PRN    Artificial Tears Op Soln 1 drop, Q6H PRN    atorvastatin (LIPITOR) tablet 10 mg, Daily    bisacodyl (DULCOLAX) EC tablet 10 mg, Daily PRN    bisacodyl (DULCOLAX) rectal suppository 10 mg, Daily PRN    carvedilol (COREG) tablet 6.25 mg, BID With Meals    clonazePAM (KlonoPIN) tablet 0.5 mg, Daily    clonazePAM (KlonoPIN) tablet 1 mg, HS    cloZAPine (CLOZARIL) tablet 900 mg, HS    cyanocobalamin (VITAMIN B-12) tablet 1,000 mcg, Daily    Diclofenac Sodium (VOLTAREN) 1 % topical gel 2 g, 4x Daily PRN    famotidine (PEPCID) tablet 20 mg, BID     fluticasone (ARNUITY ELLIPTA) 100 MCG/ACT inhaler 1 puff, Daily    hydrOXYzine HCL (ATARAX) tablet 25 mg, Q6H PRN Max 4/day    hydrOXYzine HCL (ATARAX) tablet 50 mg, Q6H PRN Max 4/day    LORazepam (ATIVAN) injection 1 mg, Q6H PRN Max 3/day    LORazepam (ATIVAN) tablet 1 mg, Q6H PRN Max 3/day    melatonin tablet 3 mg, HS    mirtazapine (REMERON) tablet 7.5 mg, HS PRN    Multivitamin liquid 15 mL, Daily    OLANZapine (ZyPREXA) tablet 2.5 mg, Q6H PRN **OR** OLANZapine (ZyPREXA) IM injection 2.5 mg, Q6H PRN    OLANZapine (ZyPREXA) tablet 5 mg, Q6H PRN **OR** OLANZapine (ZyPREXA) IM injection 5 mg, Q6H PRN    ondansetron (ZOFRAN-ODT) dispersible tablet 4 mg, Q6H PRN    polyethylene glycol (MIRALAX) packet 17 g, Daily    polyethylene glycol (MIRALAX) packet 17 g, Daily PRN    prazosin (MINIPRESS) capsule 5 mg, HS    senna-docusate sodium (SENOKOT S) 8.6-50 mg per tablet 2 tablet, HS    traZODone (DESYREL) tablet 50 mg, HS    valproic acid (DEPAKENE) oral soln 250 mg, HS.      Lab Results: I have reviewed the following results:  Most Recent Labs:   Lab Results   Component Value Date    WBC 8.69 12/04/2024    RBC 4.46 12/04/2024    HGB 14.3 12/04/2024    HCT 43.5 12/04/2024     12/04/2024    RDW 13.4 12/04/2024    NEUTROABS 4.18 12/04/2024    SODIUM 138 10/12/2024    K 4.1 10/12/2024     10/12/2024    CO2 29 10/12/2024    BUN 18 10/12/2024    CREATININE 0.97 10/12/2024    GLUC 146 (H) 10/12/2024    GLUF 98 08/10/2024    CALCIUM 9.2 10/12/2024    AST 12 (L) 10/12/2024    ALT 12 10/12/2024    ALKPHOS 84 10/12/2024    TP 6.7 10/12/2024    ALB 3.9 10/12/2024    TBILI 0.26 10/12/2024    VALPROICTOT 54 10/12/2024    AMMONIA 32 07/03/2024    GAQ1YDIAPWLZ 2.000 07/24/2024    HGBA1C 6.4 (H) 05/03/2024     05/03/2024       Administrative Statements   I have spent a total time of 30 minutes in caring for this patient on the day of the visit/encounter including medication management, treatment and chart review.

## 2024-12-08 NOTE — NURSING NOTE
PRN Tylenol 975 mg given at 1002 for 10/10 pain in right leg. Patient currently resting in bed with eyes closed. No objective S/S of discomfort or distress noted. PRN Tylenol effective.

## 2024-12-08 NOTE — NURSING NOTE
"Patient awake and alert. Denies physical pain at this time. Patient states that her mood is \"fine\" and \"the doctor said I might go home for [place]mie. I'm excited to put up my little trees and see my cat again.\" Denies SI/HI/AVH. While giving medications, patient stated that the computer screen on the med cart looked \"red\" to her. She then asked, \"Are we in purgatory? I mean, is this where people go before they go to hell?\" Reflected questions back to patient for her opinion. Patient stated, \"My mom told me that the only hell is where there's no love. Thanks for listening to me.\" Emotional support provided. Denies unmet needs at this time.  "

## 2024-12-09 LAB
BASOPHILS # BLD AUTO: 0.08 THOUSANDS/ΜL (ref 0–0.1)
BASOPHILS NFR BLD AUTO: 1 % (ref 0–1)
CK SERPL-CCNC: 27 U/L (ref 26–192)
CRP SERPL QL: 1.6 MG/L
EOSINOPHIL # BLD AUTO: 0 THOUSAND/ΜL (ref 0–0.61)
EOSINOPHIL NFR BLD AUTO: 0 % (ref 0–6)
ERYTHROCYTE [DISTWIDTH] IN BLOOD BY AUTOMATED COUNT: 13.6 % (ref 11.6–15.1)
HCT VFR BLD AUTO: 41.6 % (ref 34.8–46.1)
HGB BLD-MCNC: 13.4 G/DL (ref 11.5–15.4)
IMM GRANULOCYTES # BLD AUTO: 0.03 THOUSAND/UL (ref 0–0.2)
IMM GRANULOCYTES NFR BLD AUTO: 0 % (ref 0–2)
LYMPHOCYTES # BLD AUTO: 3.28 THOUSANDS/ΜL (ref 0.6–4.47)
LYMPHOCYTES NFR BLD AUTO: 42 % (ref 14–44)
MCH RBC QN AUTO: 31.8 PG (ref 26.8–34.3)
MCHC RBC AUTO-ENTMCNC: 32.2 G/DL (ref 31.4–37.4)
MCV RBC AUTO: 99 FL (ref 82–98)
MONOCYTES # BLD AUTO: 0.86 THOUSAND/ΜL (ref 0.17–1.22)
MONOCYTES NFR BLD AUTO: 11 % (ref 4–12)
NEUTROPHILS # BLD AUTO: 3.5 THOUSANDS/ΜL (ref 1.85–7.62)
NEUTS SEG NFR BLD AUTO: 46 % (ref 43–75)
NRBC BLD AUTO-RTO: 0 /100 WBCS
PLATELET # BLD AUTO: 251 THOUSANDS/UL (ref 149–390)
PMV BLD AUTO: 9.2 FL (ref 8.9–12.7)
RBC # BLD AUTO: 4.22 MILLION/UL (ref 3.81–5.12)
WBC # BLD AUTO: 7.75 THOUSAND/UL (ref 4.31–10.16)

## 2024-12-09 PROCEDURE — 85025 COMPLETE CBC W/AUTO DIFF WBC: CPT

## 2024-12-09 PROCEDURE — 86140 C-REACTIVE PROTEIN: CPT

## 2024-12-09 PROCEDURE — 99232 SBSQ HOSP IP/OBS MODERATE 35: CPT | Performed by: STUDENT IN AN ORGANIZED HEALTH CARE EDUCATION/TRAINING PROGRAM

## 2024-12-09 PROCEDURE — 82550 ASSAY OF CK (CPK): CPT

## 2024-12-09 RX ADMIN — MELATONIN TAB 3 MG 3 MG: 3 TAB at 21:19

## 2024-12-09 RX ADMIN — PRAZOSIN HYDROCHLORIDE 5 MG: 5 CAPSULE ORAL at 21:18

## 2024-12-09 RX ADMIN — FAMOTIDINE 20 MG: 20 TABLET ORAL at 17:05

## 2024-12-09 RX ADMIN — CYANOCOBALAMIN TAB 1000 MCG 1000 MCG: 1000 TAB at 08:51

## 2024-12-09 RX ADMIN — TRAZODONE HYDROCHLORIDE 50 MG: 50 TABLET ORAL at 21:19

## 2024-12-09 RX ADMIN — ATORVASTATIN CALCIUM 10 MG: 10 TABLET, FILM COATED ORAL at 08:51

## 2024-12-09 RX ADMIN — FLUTICASONE FUROATE 1 PUFF: 100 POWDER RESPIRATORY (INHALATION) at 08:54

## 2024-12-09 RX ADMIN — SENNOSIDES AND DOCUSATE SODIUM 2 TABLET: 8.6; 5 TABLET ORAL at 21:18

## 2024-12-09 RX ADMIN — CLOZAPINE 900 MG: 200 TABLET ORAL at 21:18

## 2024-12-09 RX ADMIN — FAMOTIDINE 20 MG: 20 TABLET ORAL at 08:51

## 2024-12-09 RX ADMIN — CLONAZEPAM 0.5 MG: 0.5 TABLET ORAL at 08:51

## 2024-12-09 RX ADMIN — VALPROIC ACID 250 MG: 500 SOLUTION ORAL at 21:19

## 2024-12-09 RX ADMIN — CARVEDILOL 6.25 MG: 6.25 TABLET, FILM COATED ORAL at 16:46

## 2024-12-09 RX ADMIN — Medication 15 ML: at 08:51

## 2024-12-09 RX ADMIN — CLONAZEPAM 1 MG: 1 TABLET ORAL at 21:18

## 2024-12-09 RX ADMIN — POLYETHYLENE GLYCOL 3350 17 G: 17 POWDER, FOR SOLUTION ORAL at 08:54

## 2024-12-09 NOTE — TREATMENT TEAM
12/09/24 0800   Team Meeting   Meeting Type Daily Rounds   Team Members Present   Team Members Present Physician;Nurse;;;Occupational Therapist   Physician Team Member Dr. Banuelos / Dr. Hurst / Dr. Bland / JAIR Ziegler   Nursing Team Member David/Keith   Care Management Team Member Manju / Tee   Social Work Team Member Anastacio   OT Team Member Eduardo   Patient/Family Present   Patient Present No   Patient's Family Present No     Pt is reported to have had a neuro psych consult that showed Pt has capacity with need of higher level of care. Meeting today [place] 1130 with county and case workers to discuss community supports. Pt reported to be anxious and depressed yesterday.

## 2024-12-09 NOTE — NURSING NOTE
Patient was withdrawn to her room but came out to the milieu for snack. Denies all psych s/s. No behaviors noted. No c/o pain. No needs expressed. Took her HS medications. Safety checks ongoing.

## 2024-12-09 NOTE — PLAN OF CARE
Problem: Alteration in Thoughts and Perception  Problem: Ineffective Coping  Goal: Participates in unit activities  Description: Interventions:  - Provide therapeutic environment   - Provide required programming   - Redirect inappropriate behaviors   Outcome: Progressing     Problem: Risk for Self Injury/Neglect  Goal: Treatment Goal: Remain safe during length of stay, learn and adopt new coping skills, and be free of self-injurious ideation, impulses and acts at the time of discharge  Outcome: Progressing  Goal: Verbalize thoughts and feelings  Description: Interventions:  - Assess and re-assess patient's lethality and potential for self-injury  - Engage patient in 1:1 interactions, daily, for a minimum of 15 minutes  - Encourage patient to express feelings, fears, frustrations, hopes  - Establish rapport/trust with patient   Outcome: Progressing  Goal: Refrain from harming self  Description: Interventions:  - Monitor patient closely, per order  - Develop a trusting relationship  - Supervise medication ingestion, monitor effects and side effects   Outcome: Progressing  Goal: Attend and participate in unit activities, including therapeutic, recreational, and educational groups  Description: Interventions:  - Provide therapeutic and educational activities daily, encourage attendance and participation, and document same in the medical record  - Obtain collateral information, encourage visitation and family involvement in care   Outcome: Progressing  Goal: Recognize maladaptive responses and adopt new coping mechanisms  Outcome: Progressing     Goal: Verbalize thoughts and feelings  Description: Interventions:  - Promote a nonjudgmental and trusting relationship with the patient through active listening and therapeutic communication  - Assess patient's level of functioning, behavior and potential for risk  - Engage patient in 1 on 1 interactions  - Encourage patient to express fears, feelings, frustrations, and  discuss symptoms    - [place] patient to reality, help patient recognize reality-based thinking   - Administer medications as ordered and assess for potential side effects  - Provide the patient education related to the signs and symptoms of the illness and desired effects of prescribed medications  Outcome: Progressing

## 2024-12-09 NOTE — PROGRESS NOTES
Progress Note - Behavioral Health   Name: Meli Nowak 59 y.o. female I MRN: [id number]  Unit/Bed#: [facility] 651-02 I Date of Admission: 7/23/2024   Date of Service: 12/9/2024 I Hospital Day: 139     Assessment & Plan  Schizoaffective disorder, bipolar type (HCC)  No medication changes today  Depakote to 250 mg nightly-12/6/2024  Most recent VPA level 54 on 10/12/24  CMP on 10/12/24 reviewed, WNL  Clozaril 900 mg nightly for schizoaffective disorder  Clozaril monitoring:   CRP, CBC/diff, CK QMon for monitoring  ANC 12/9/24- 3.50  Most recent ECG reviewed 9/9/24 - normal ECG, NSR  Clozaril increased to 900 mg nightly on 10/29/24 - Clozaril level 1076 on 12/5/24 - will continue to monitor the level  Risperdal discontinued on 10/28/24 for moderate affectivity  Ativan changed to Klonopin 0.5 mg daily/ 1 mg qhs    Continue melatonin 3 mg nightly for insomnia  Continue prazosin 5 mg nightly for PTSD associated nightmares; doses to be adjusted as indicated   Continue trazodone 50 mg nightly for insomnia  Discharge disposition: LTSR vs. Home w/ services (meeting with ICM on 12/9/24)        ASHLIE (obstructive sleep apnea)  - f/u w/ Sleep Medicine in outpatient setting and consider CPAP as indicated          Plan   Progress Toward Goals:   Meli is progressing towards goals of inpatient psychiatric treatment by continued medication compliance and is attending therapeutic modalities on the milieu. However, the patient continues to require inpatient psychiatric hospitalization for continued medication management and titration to optimize symptom reduction, improve sleep hygiene, and demonstrate adequate self-care.    Recommended Treatment: Treatment plan and medication changes discussed and per the attending physician the plan is:    1.Continue with group therapy, milieu therapy and occupational therapy  2.Behavioral Health checks every 7 minutes  3.Continue frequent safety checks and vitals per unit protocol  4.Continue with SLIM  medical management as indicated  5.Continue with current medication regimen  6.Will review labs in the a.m.  7.Disposition Planning: Discharge planning and efforts remain ongoing    Behavioral Health Medications: all current active meds have been reviewed and continue current psychiatric medications.  Current Facility-Administered Medications   Medication Dose Route Frequency Provider Last Rate    acetaminophen  650 mg Oral Q4H PRN Marie Ziegler, CRNP      acetaminophen  650 mg Oral Q4H PRN Marie Ziegler, CRNP      acetaminophen  975 mg Oral Q6H PRN Marie Ziegler, CRNP      albuterol  2 puff Inhalation Q4H PRN Kristen Logan, CHRISTOPHERNP      aluminum-magnesium hydroxide-simethicone  30 mL Oral Q4H PRN Parris Bolanos, CRNP      Artificial Tears  1 drop Both Eyes Q6H PRN Dereje Banuelos MD      atorvastatin  10 mg Oral Daily Marie Ziegler, CRNP      bisacodyl  10 mg Oral Daily PRN Kristen Logan, CRNP      bisacodyl  10 mg Rectal Daily PRN Marie Ziegler, CRNP      carvedilol  6.25 mg Oral BID With Meals Marie Ziegler, JOSE ELIAS      clonazePAM  0.5 mg Oral Daily Marie Ziegler, CRNP      clonazePAM  1 mg Oral HS Marie Ziegler, CRNP      cloZAPine  900 mg Oral HS Marie Ziegler, CRNP      cyanocobalamin  1,000 mcg Oral Daily Marie Ziegler, CRNP      Diclofenac Sodium  2 g Topical 4x Daily PRN Marie Ziegler, CRNP      famotidine  20 mg Oral BID Shan Fitzgerald DO      fluticasone  1 puff Inhalation Daily Marie Ziegler, CRNP      hydrOXYzine HCL  25 mg Oral Q6H PRN Max 4/day Marie Ziegler, CRNP      hydrOXYzine HCL  50 mg Oral Q6H PRN Max 4/day Marie Ziegler, CRNP      LORazepam  1 mg Intramuscular Q6H PRN Max 3/day Marie Ziegler, CRNP      LORazepam  1 mg Oral Q6H PRN Max 3/day Marie Ziegler, CRNP      melatonin  3 mg Oral HS Marie Ziegler, CRNP      mirtazapine  7.5 mg Oral HS PRN Marie Ziegler, CRNP      Multivitamin  15 mL Oral Daily Marie Ziegler, CRNP      OLANZapine   2.5 mg Oral Q6H PRN Dereje Banuelos MD      Or    OLANZapine  2.5 mg Intramuscular Q6H PRN Dereje Banuelos MD      OLANZapine  5 mg Oral Q6H PRN Dereje Banuelos MD      Or    OLANZapine  5 mg Intramuscular Q6H PRN Dereje Banuelos MD      ondansetron  4 mg Oral Q6H PRN Darin Lawton MD      polyethylene glycol  17 g Oral Daily JOSE ELIAS Ortega      polyethylene glycol  17 g Oral Daily PRN JOSE ELIAS Figueroa      prazosin  5 mg Oral HS JOSE ELIAS Figueroa      senna-docusate sodium  2 tablet Oral HS Rehana Borrego PA-C      traZODone  50 mg Oral HS JOSE ELIAS Figueroa      valproic acid  250 mg Oral HS JOSE ELIAS Figueroa         Risks / Benefits of Treatment:  Risks, benefits, and possible side effects of medications explained to patient and patient verbalizes understanding and agreement for treatment.       Subjective    Patient was seen today for continuation of care, records reviewed and patient was discussed with the morning case review team.    Meli was seen today for psychiatric follow-up.  On assessment today, Meli was in her room.  Still isolated and withdrawn at times, patient continues to be encouraged by staff to be active on the unit and social with select peers.  Meeting held with treatment team and [facility] to discuss discharge disposition, [facility] made aware that Meli will need significant support in the community to assist with management of mental illness.  Stating that she is able to provide services,  will actively work with ICM to secure discharge needs.  Auditory hallucinations still endorsed, she does state that they are manageable. Meli denies acute suicidal/self-harm ideation/intent/plan upon direct inquiry at this time.  Meli remains behaviorally appropriate, no agitation or aggression noted on exam or in report.  Impulse control is intact.  Meli remains adherent to her current psychotropic medication regimen and denies any side effects from medications, as well  as none noted on exam.    Keller is stable and ready for discharge, however, even at baseline, Meli continues with poor insight, judgement and coping skills that pose a risk to patient in a homeless shelter, or otherwise unsupervised setting. Keller is likely to decompensate rapidly, thus, becoming a risk of danger to self/others. Case management is assertively/actively working on securing the necessary level of care     Psychiatric Review of Systems:  Behavior over the last 24 hours:  unchanged.   Sleep: good  Appetite: good  Medication side effects: none  ROS: no complaints, denies shortness of breath or chest pain and all other systems are negative for acute changes        Objective    Vitals:  Vitals:    12/09/24 0724   BP: 105/64   Pulse: 83   Resp: 17   Temp: 98 °F (36.7 °C)   SpO2: 94%       Laboratory Results:  I have personally reviewed all pertinent laboratory/tests results.  Most Recent Labs:   Lab Results   Component Value Date    WBC 7.75 12/09/2024    RBC 4.22 12/09/2024    HGB 13.4 12/09/2024    HCT 41.6 12/09/2024     12/09/2024    RDW 13.6 12/09/2024    NEUTROABS 3.50 12/09/2024    SODIUM 138 10/12/2024    K 4.1 10/12/2024     10/12/2024    CO2 29 10/12/2024    BUN 18 10/12/2024    CREATININE 0.97 10/12/2024    GLUC 146 (H) 10/12/2024    GLUF 98 08/10/2024    CALCIUM 9.2 10/12/2024    AST 12 (L) 10/12/2024    ALT 12 10/12/2024    ALKPHOS 84 10/12/2024    TP 6.7 10/12/2024    ALB 3.9 10/12/2024    TBILI 0.26 10/12/2024    VALPROICTOT 54 10/12/2024    AMMONIA 32 07/03/2024    [id number] 2.000 07/24/2024    HGBA1C 6.4 (H) 05/03/2024     05/03/2024       Mental Status Evaluation:  Appearance:  disheveled, marginal hygiene   Behavior:  cooperative, calm, guarded   Speech:  normal rate and volume   Mood:  less anxious, less depressed   Affect:  constricted   Thought Process:  perseverative, concrete   Thought Content:  Muslim and somatic preoccupation, paranoid ideation   Perceptual  Disturbances: auditory hallucinations still present, but less frequent   Risk Potential: Suicidal ideation - None  Homicidal ideation - None  Potential for aggression - No   Memory:  recent and remote memory grossly intact   Sensorium  person, place, and time/date      Consciousness:  alert and awake   Attention: attention span and concentration are age appropriate   Insight:  limited   Judgment: limited   Gait/Station: normal gait/station   Motor Activity: no abnormal movements       Counseling / Coordination of Care:  Patient's progress reviewed with nursing staff.  Medications, treatment progress and treatment plan reviewed with patient.  Supportive counseling provided to the patient.    This note was completed in part utilizing Dragon dictation Software. Grammatical, translation, syntax errors, random word insertions, spelling mistakes, and incomplete sentences may be an occasional consequence of this system secondary to software limitations with voice recognition, ambient noise, and hardware issues. If you have any questions or concerns about the content, text, or information contained within the body of this dictation, please contact the provider for clarification

## 2024-12-09 NOTE — PROGRESS NOTES
"Pt attended 1 am group.   Pt was able to stay for duration and bright, less anxious,  Pt was hopeful about d.c needs stating, \"I hope to leave before [place] and I can be with my cat.  I have not seen my cat for a long time\".  Pt indicated her progress and confirmed that she is not as anxious.       12/09/24 1000   Activity/Group Checklist   Group Community meeting   Attendance Attended   Attendance Duration (min) 46-60   Interactions Interacted appropriately   Affect/Mood Bright   Goals Achieved Identified feelings;Identified triggers;Identified relapse prevention strategies;Discussed coping strategies;Able to listen to others;Able to engage in interactions;Able to reflect/comment on own behavior;Able to manage/cope with feelings;Verbalized increased hopefulness;Able to self-disclose;Able to recieve feedback       "

## 2024-12-09 NOTE — CASE MANAGEMENT
Team meeting held with Fransico Hernandez, [facility] Coordinator and BC supervisor for  MARIS, Conchita HINTON, Marie MOCTEZUMA, Dr Banuelos and CM. Update provided by Dr Banuelos. Medication and clinical update provided by Dr Banuelos and Marie. Dr Banuelos expressed concerns that pt is superficial in conversations and will only report what pt believes provider wants to hear. Pt also expressed concern and anxiety on how pt will handle being home alone and expressed worries of panic attacks when home alone. Concerns also relayed regarding pt being high risk for medication noncompliance. Team informed of pt's need for weekly Clozaril lab work every Monday for next 6 months; then every other week. Dr Banuelos reviewed importance and need for pt to comply for lab work. CM reviewed poss plan for VNA referral.  Carlito reports pt will be able to be set up with increased community supports including mobile psych rehab with [facility] and psych rehab with [facility]. Anticipated d/c for after Christmas holidays due to limited staffing with [facility]. Conchita reports able to provide one in person visit weekly and will conduct phone check ins.

## 2024-12-09 NOTE — ASSESSMENT & PLAN NOTE
No medication changes today  Depakote to 250 mg nightly-12/6/2024  Most recent VPA level 54 on 10/12/24  CMP on 10/12/24 reviewed, WNL  Clozaril 900 mg nightly for schizoaffective disorder  Clozaril monitoring:   CRP, CBC/diff, CK QMon for monitoring  ANC 12/9/24- 3.50  Most recent ECG reviewed 9/9/24 - normal ECG, NSR  Clozaril increased to 900 mg nightly on 10/29/24 - Clozaril level 1076 on 12/5/24 - will continue to monitor the level  Risperdal discontinued on 10/28/24 for moderate affectivity  Ativan changed to Klonopin 0.5 mg daily/ 1 mg qhs    Continue melatonin 3 mg nightly for insomnia  Continue prazosin 5 mg nightly for PTSD associated nightmares; doses to be adjusted as indicated   Continue trazodone 50 mg nightly for insomnia  Discharge disposition: LTSR vs. Home w/ services (meeting with ICM on 12/9/24)

## 2024-12-09 NOTE — NURSING NOTE
"Patient drowsy in bed, awakens easily to voice. Patient requests, \"Can I just stay in bed and shower later? I'm really tired.\" Scheduled Coreg held this morning for /64. Patient denies anxiety, depression, or any other psych-related S/S. Cooperative with medications and care. No unmet needs at this time.  "

## 2024-12-10 PROCEDURE — 99232 SBSQ HOSP IP/OBS MODERATE 35: CPT | Performed by: STUDENT IN AN ORGANIZED HEALTH CARE EDUCATION/TRAINING PROGRAM

## 2024-12-10 RX ADMIN — FLUTICASONE FUROATE 1 PUFF: 100 POWDER RESPIRATORY (INHALATION) at 12:50

## 2024-12-10 RX ADMIN — CYANOCOBALAMIN TAB 1000 MCG 1000 MCG: 1000 TAB at 08:01

## 2024-12-10 RX ADMIN — CLOZAPINE 900 MG: 200 TABLET ORAL at 21:08

## 2024-12-10 RX ADMIN — FAMOTIDINE 20 MG: 20 TABLET ORAL at 17:01

## 2024-12-10 RX ADMIN — ATORVASTATIN CALCIUM 10 MG: 10 TABLET, FILM COATED ORAL at 08:01

## 2024-12-10 RX ADMIN — FAMOTIDINE 20 MG: 20 TABLET ORAL at 08:01

## 2024-12-10 RX ADMIN — CARVEDILOL 6.25 MG: 6.25 TABLET, FILM COATED ORAL at 08:01

## 2024-12-10 RX ADMIN — SENNOSIDES AND DOCUSATE SODIUM 2 TABLET: 8.6; 5 TABLET ORAL at 21:08

## 2024-12-10 RX ADMIN — CARVEDILOL 6.25 MG: 6.25 TABLET, FILM COATED ORAL at 16:10

## 2024-12-10 RX ADMIN — Medication 15 ML: at 08:00

## 2024-12-10 RX ADMIN — PRAZOSIN HYDROCHLORIDE 5 MG: 5 CAPSULE ORAL at 21:08

## 2024-12-10 RX ADMIN — POLYETHYLENE GLYCOL 3350 17 G: 17 POWDER, FOR SOLUTION ORAL at 08:01

## 2024-12-10 RX ADMIN — MELATONIN TAB 3 MG 3 MG: 3 TAB at 21:09

## 2024-12-10 RX ADMIN — CLONAZEPAM 0.5 MG: 0.5 TABLET ORAL at 08:00

## 2024-12-10 RX ADMIN — TRAZODONE HYDROCHLORIDE 50 MG: 50 TABLET ORAL at 21:09

## 2024-12-10 RX ADMIN — CLONAZEPAM 1 MG: 1 TABLET ORAL at 21:09

## 2024-12-10 NOTE — PROGRESS NOTES
12/10/24 0816   Team Meeting   Meeting Type Daily Rounds   Initial Conference Date 12/10/24   Team Members Present   Team Members Present Physician;Nurse;;Occupational Therapist   Physician Team Member Vin/Darshana/Natali/Edwina/Yessy   Nursing Team Member Clarissa   Care Management Team Member Jason IRENE Team Member Syd   Patient/Family Present   Patient Present No   Patient's Family Present No     Patient had a meeting with her ICM and team yesterday but ICM was not able to make the timing work. Patient denies all signs and symptoms. Withdrawn to her room for the most part but is attending some groups. Patient discharge is pending stabilization of mood and medications.

## 2024-12-10 NOTE — PLAN OF CARE
Problem: Depression  Goal: Treatment Goal: Demonstrate behavioral control of depressive symptoms, verbalize feelings of improved mood/affect, and adopt new coping skills prior to discharge  Outcome: Progressing  Goal: Verbalize thoughts and feelings  Description: Interventions:  - Assess and re-assess patient's level of risk   - Engage patient in 1:1 interactions, daily, for a minimum of 15 minutes   - Encourage patient to express feelings, fears, frustrations, hopes   Outcome: Progressing  Goal: Refrain from isolation  Description: Interventions:  - Develop a trusting relationship   - Encourage socialization   Outcome: Progressing  Goal: Refrain from self-neglect  Outcome: Progressing  Goal: Attend and participate in unit activities, including therapeutic, recreational, and educational groups  Description: Interventions:  - Provide therapeutic and educational activities daily, encourage attendance and participation, and document same in the medical record   Outcome: Progressing     Problem: Anxiety  Goal: Anxiety is at manageable level  Description: Interventions:  - Assess and monitor patient's anxiety level.   - Monitor for signs and symptoms (heart palpitations, chest pain, shortness of breath, headaches, nausea, feeling jumpy, restlessness, irritable, apprehensive).   - Collaborate with interdisciplinary team and initiate plan and interventions as ordered.  - [place] patient to unit/surroundings  - Explain treatment plan  - Encourage participation in care  - Encourage verbalization of concerns/fears  - Identify coping mechanisms  - Assist in developing anxiety-reducing skills  - Administer/offer alternative therapies  - Limit or eliminate stimulants  Outcome: Progressing     Problem: Potential for Falls  Goal: Patient will remain free of falls  Description: INTERVENTIONS:  - Educate patient on patient safety including physical limitations  - Instruct patient to call for assistance with activity   - Consult  OT/PT to assist with strengthening/mobility   - Keep Call bell within reach  - Keep bed low and locked with side rails adjusted as appropriate  - Keep care items and personal belongings within reach  - Initiate and maintain comfort rounds  - Offer Toileting every 2 Hours, in advance of need  - Initiate/Maintain bed and chair alarm  - Obtain necessary fall risk management equipment: walker, wheelchair  - Apply yellow socks and bracelet for high fall risk patients  - Patient moved to room near nurses station  Outcome: Progressing

## 2024-12-10 NOTE — NURSING NOTE
Patient is pleasant and cooperative , with poor concentration. Her behaviors and attitude is suspicious, warm and friendly. Patient has bizarre appearance with worried affect. Good medication compliance.patient stated her body hurts because she didn't walk today. Patient was encouraged to walk .   Impulsive behaviors not  observed.Denies Si,HI. A,V/H.

## 2024-12-10 NOTE — PROGRESS NOTES
Progress Note - Behavioral Health   Name: Meli Nowak 59 y.o. female I MRN: [id number]  Unit/Bed#: [facility] 651-02 I Date of Admission: 7/23/2024   Date of Service: 12/10/2024 I Hospital Day: 140     Assessment & Plan  Schizoaffective disorder, bipolar type (HCC)  No medication changes today  Depakote to 250 mg nightly-12/6/2024  Most recent VPA level 54 on 10/12/24  CMP on 10/12/24 reviewed, WNL  Clozaril 900 mg nightly for schizoaffective disorder  Clozaril monitoring:   CRP, CBC/diff, CK QMon for monitoring  ANC 12/9/24- 3.50  Most recent ECG reviewed 9/9/24 - normal ECG, NSR  Clozaril increased to 900 mg nightly on 10/29/24 - Clozaril level 1076 on 12/5/24 - will continue to monitor the level  Risperdal discontinued on 10/28/24 for moderate affectivity  Ativan changed to Klonopin 0.5 mg daily/ 1 mg qhs    Continue melatonin 3 mg nightly for insomnia  Continue prazosin 5 mg nightly for PTSD associated nightmares; doses to be adjusted as indicated   Continue trazodone 50 mg nightly for insomnia  Discharge disposition: LTSR vs. Home w/ services (meeting with ICM on 12/9/24)        ASHLIE (obstructive sleep apnea)  - f/u w/ Sleep Medicine in outpatient setting and consider CPAP as indicated          Plan   Progress Toward Goals:   Meli is progressing towards goals of inpatient psychiatric treatment by continued medication compliance and is attending therapeutic modalities on the milieu. However, the patient continues to require inpatient psychiatric hospitalization for continued medication management and titration to optimize symptom reduction, improve sleep hygiene, and demonstrate adequate self-care.    Recommended Treatment: Treatment plan and medication changes discussed and per the attending physician the plan is:    1.Continue with group therapy, milieu therapy and occupational therapy  2.Behavioral Health checks every 7 minutes  3.Continue frequent safety checks and vitals per unit protocol  4.Continue with SLIM  medical management as indicated  5.Continue with current medication regimen  6.Will review labs in the a.m.  7.Disposition Planning: Discharge planning and efforts remain ongoing    Behavioral Health Medications: all current active meds have been reviewed and continue current psychiatric medications.  Current Facility-Administered Medications   Medication Dose Route Frequency Provider Last Rate    acetaminophen  650 mg Oral Q4H PRN Marie Ziegler, CRNP      acetaminophen  650 mg Oral Q4H PRN Marie Ziegler, CRNP      acetaminophen  975 mg Oral Q6H PRN Marie Ziegler, CRNP      albuterol  2 puff Inhalation Q4H PRN Kristen Logan, CHRISTOPHERNP      aluminum-magnesium hydroxide-simethicone  30 mL Oral Q4H PRN Parris Bolanos, CRNP      Artificial Tears  1 drop Both Eyes Q6H PRN Dereje Banuelos MD      atorvastatin  10 mg Oral Daily Marie Ziegler, CRNP      bisacodyl  10 mg Oral Daily PRN Kristen Logan, CRNP      bisacodyl  10 mg Rectal Daily PRN Marie Ziegler, CRNP      carvedilol  6.25 mg Oral BID With Meals Marie Ziegler, JOSE ELIAS      clonazePAM  0.5 mg Oral Daily Marie Ziegler, CRNP      clonazePAM  1 mg Oral HS Marie Ziegler, CRNP      cloZAPine  900 mg Oral HS Marie Ziegler, CRNP      cyanocobalamin  1,000 mcg Oral Daily Marie Ziegler, CRNP      Diclofenac Sodium  2 g Topical 4x Daily PRN Marie Ziegler, CRNP      famotidine  20 mg Oral BID Shan Fitzgerald DO      fluticasone  1 puff Inhalation Daily Marie Ziegler, CRNP      hydrOXYzine HCL  25 mg Oral Q6H PRN Max 4/day Marie Ziegler, CRNP      hydrOXYzine HCL  50 mg Oral Q6H PRN Max 4/day Marie Ziegler, CRNP      LORazepam  1 mg Intramuscular Q6H PRN Max 3/day Marie Ziegler, CRNP      LORazepam  1 mg Oral Q6H PRN Max 3/day Marie Ziegler, CRNP      melatonin  3 mg Oral HS Marie Ziegler, CRNP      mirtazapine  7.5 mg Oral HS PRN Marie Ziegler, CRNP      Multivitamin  15 mL Oral Daily Marie Ziegler, CRNP      OLANZapine   2.5 mg Oral Q6H PRN Dereje Banuelos MD      Or    OLANZapine  2.5 mg Intramuscular Q6H PRN Dereje Banuelos MD      OLANZapine  5 mg Oral Q6H PRN Dereje Banuelos MD      Or    OLANZapine  5 mg Intramuscular Q6H PRN Dereje Banuelos MD      ondansetron  4 mg Oral Q6H PRN Darin Lawton MD      polyethylene glycol  17 g Oral Daily JOSE ELIAS Ortega      polyethylene glycol  17 g Oral Daily PRN JOSE ELIAS Figueroa      prazosin  5 mg Oral HS JOSE ELIAS Figueroa      senna-docusate sodium  2 tablet Oral HS Rehana Borrego PA-C      traZODone  50 mg Oral HS JOSE ELIAS Figueroa      valproic acid  250 mg Oral HS JOSE ELIAS Figueroa         Risks / Benefits of Treatment:  Risks, benefits, and possible side effects of medications explained to patient and patient verbalizes understanding and agreement for treatment.       Subjective    Patient was seen today for continuation of care, records reviewed and patient was discussed with the morning case review team.    Meli was seen today for psychiatric follow-up.  On assessment today, Meli was seem lying in bed. Only visible during meals, remains multi withdrawn.  No current symptoms reported, patient also reports that auditory hallucinations are not as prominent.  Encouraged to be more active on the unit to assist with coping skills, Meli will need continued education to manage mood and medication compliance upon discharge.   Meli denies acute suicidal/self-harm ideation/intent/plan upon direct inquiry at this time.  Meli remains behaviorally appropriate, no agitation or aggression noted on exam or in report. Impulse control is intact.  Meli remains adherent to her current psychotropic medication regimen and denies any side effects from medications, as well as none noted on exam.    Meli is stable and ready for discharge, however, even at baseline, Meli continues with poor insight, judgement and coping skills that pose a risk to patient in a homeless  shelter, or otherwise unsupervised setting. Meli is likely to decompensate rapidly, thus, becoming a risk of danger to self/others. Case management is assertively/actively working on securing the necessary level of care     Psychiatric Review of Systems:  Behavior over the last 24 hours:  unchanged.   Sleep: good  Appetite: good  Medication side effects: none  ROS: no complaints, denies shortness of breath or chest pain and all other systems are negative for acute changes        Objective    Vitals:  Vitals:    12/10/24 0743   BP: 127/61   Pulse: 72   Resp: 19   Temp: 97.9 °F (36.6 °C)   SpO2: 93%       Laboratory Results:  I have personally reviewed all pertinent laboratory/tests results.  Most Recent Labs:   Lab Results   Component Value Date    WBC 7.75 12/09/2024    RBC 4.22 12/09/2024    HGB 13.4 12/09/2024    HCT 41.6 12/09/2024     12/09/2024    RDW 13.6 12/09/2024    NEUTROABS 3.50 12/09/2024    SODIUM 138 10/12/2024    K 4.1 10/12/2024     10/12/2024    CO2 29 10/12/2024    BUN 18 10/12/2024    CREATININE 0.97 10/12/2024    GLUC 146 (H) 10/12/2024    GLUF 98 08/10/2024    CALCIUM 9.2 10/12/2024    AST 12 (L) 10/12/2024    ALT 12 10/12/2024    ALKPHOS 84 10/12/2024    TP 6.7 10/12/2024    ALB 3.9 10/12/2024    TBILI 0.26 10/12/2024    VALPROICTOT 54 10/12/2024    AMMONIA 32 07/03/2024    [id number] 2.000 07/24/2024    HGBA1C 6.4 (H) 05/03/2024     05/03/2024       Mental Status Evaluation:  Appearance:  disheveled, marginal hygiene   Behavior:  cooperative, guarded   Speech:  normal rate and volume   Mood:  less anxious, less depressed   Affect:  constricted   Thought Process:  perseverative, concrete   Thought Content:  Confucianism and somatic preoccupation, paranoid ideation   Perceptual Disturbances: auditory hallucinations still present, but less frequent   Risk Potential: Suicidal ideation - None  Homicidal ideation - None  Potential for aggression - No   Memory:  recent and remote  memory grossly intact   Sensorium  person, place, and time/date      Consciousness:  alert and awake   Attention: attention span and concentration are age appropriate   Insight:  limited   Judgment: limited   Gait/Station: normal gait/station   Motor Activity: no abnormal movements       Counseling / Coordination of Care:  Patient's progress reviewed with nursing staff.  Medications, treatment progress and treatment plan reviewed with patient.  Supportive counseling provided to the patient.    This note was completed in part utilizing Dragon dictation Software. Grammatical, translation, syntax errors, random word insertions, spelling mistakes, and incomplete sentences may be an occasional consequence of this system secondary to software limitations with voice recognition, ambient noise, and hardware issues. If you have any questions or concerns about the content, text, or information contained within the body of this dictation, please contact the provider for clarification

## 2024-12-11 PROCEDURE — 99232 SBSQ HOSP IP/OBS MODERATE 35: CPT | Performed by: STUDENT IN AN ORGANIZED HEALTH CARE EDUCATION/TRAINING PROGRAM

## 2024-12-11 RX ADMIN — ACETAMINOPHEN 975 MG: 325 TABLET, FILM COATED ORAL at 08:42

## 2024-12-11 RX ADMIN — FAMOTIDINE 20 MG: 20 TABLET ORAL at 17:16

## 2024-12-11 RX ADMIN — SENNOSIDES AND DOCUSATE SODIUM 2 TABLET: 8.6; 5 TABLET ORAL at 21:27

## 2024-12-11 RX ADMIN — CARVEDILOL 6.25 MG: 6.25 TABLET, FILM COATED ORAL at 17:16

## 2024-12-11 RX ADMIN — CLONAZEPAM 1 MG: 1 TABLET ORAL at 21:27

## 2024-12-11 RX ADMIN — PRAZOSIN HYDROCHLORIDE 5 MG: 5 CAPSULE ORAL at 21:27

## 2024-12-11 RX ADMIN — CLOZAPINE 900 MG: 200 TABLET ORAL at 21:27

## 2024-12-11 RX ADMIN — POLYETHYLENE GLYCOL 3350 17 G: 17 POWDER, FOR SOLUTION ORAL at 08:38

## 2024-12-11 RX ADMIN — MELATONIN TAB 3 MG 3 MG: 3 TAB at 21:27

## 2024-12-11 RX ADMIN — TRAZODONE HYDROCHLORIDE 50 MG: 50 TABLET ORAL at 21:27

## 2024-12-11 RX ADMIN — CYANOCOBALAMIN TAB 1000 MCG 1000 MCG: 1000 TAB at 08:38

## 2024-12-11 RX ADMIN — FLUTICASONE FUROATE 1 PUFF: 100 POWDER RESPIRATORY (INHALATION) at 08:38

## 2024-12-11 RX ADMIN — FAMOTIDINE 20 MG: 20 TABLET ORAL at 08:38

## 2024-12-11 RX ADMIN — CARVEDILOL 6.25 MG: 6.25 TABLET, FILM COATED ORAL at 08:38

## 2024-12-11 RX ADMIN — ATORVASTATIN CALCIUM 10 MG: 10 TABLET, FILM COATED ORAL at 08:38

## 2024-12-11 RX ADMIN — CLONAZEPAM 0.5 MG: 0.5 TABLET ORAL at 08:38

## 2024-12-11 RX ADMIN — Medication 15 ML: at 08:38

## 2024-12-11 NOTE — PLAN OF CARE
Problem: DISCHARGE PLANNING - CARE MANAGEMENT  Goal: Discharge to post-acute care or home with appropriate resources  Description: INTERVENTIONS:  - Conduct assessment to determine patient/family and health care team treatment goals, and need for post-acute services based on payer coverage, community resources, and patient preferences, and barriers to discharge  - Address psychosocial, clinical, and financial barriers to discharge as identified in assessment in conjunction with the patient/family and health care team  - Arrange appropriate level of post-acute services according to patient’s   needs and preference and payer coverage in collaboration with the physician and health care team  - Communicate with and update the patient/family, physician, and health care team regarding progress on the discharge plan  - Arrange appropriate transportation to post-acute venues  Outcome: Progressing     Problem: Prexisting or High Potential for Compromised Skin Integrity  Goal: Skin integrity is maintained or improved  Description: INTERVENTIONS:  - Identify patients at risk for skin breakdown  - Assess and monitor skin integrity  - Assess and monitor nutrition and hydration status  - Monitor labs   - Assess for incontinence   - Turn and reposition patient  - Assist with mobility/ambulation  - Relieve pressure over bony prominences  - Avoid friction and shearing  - Provide appropriate hygiene as needed including keeping skin clean and dry  - Evaluate need for skin moisturizer/barrier cream  - Collaborate with interdisciplinary team   - Patient/family teaching  - Consider wound care consult   Outcome: Progressing     Problem: Alteration in Thoughts and Perception  Goal: Treatment Goal: Gain control of psychotic behaviors/thinking, reduce/eliminate presenting symptoms and demonstrate improved reality functioning upon discharge  Outcome: Progressing  Goal: Verbalize thoughts and feelings  Description: Interventions:  - Promote  a nonjudgmental and trusting relationship with the patient through active listening and therapeutic communication  - Assess patient's level of functioning, behavior and potential for risk  - Engage patient in 1 on 1 interactions  - Encourage patient to express fears, feelings, frustrations, and discuss symptoms    - [place] patient to reality, help patient recognize reality-based thinking   - Administer medications as ordered and assess for potential side effects  - Provide the patient education related to the signs and symptoms of the illness and desired effects of prescribed medications  Outcome: Progressing  Goal: Refrain from acting on delusional thinking/internal stimuli  Description: Interventions:  - Monitor patient closely, per order   - Utilize least restrictive measures   - Set reasonable limits, give positive feedback for acceptable   - Administer medications as ordered and monitor of potential side effects  Outcome: Progressing  Goal: Agree to be compliant with medication regime, as prescribed and report medication side effects  Description: Interventions:  - Offer appropriate PRN medication and supervise ingestion; conduct AIMS, as needed   Outcome: Progressing  Goal: Attend and participate in unit activities, including therapeutic, recreational, and educational groups  Description: Interventions:  -Encourage Visitation and family involvement in care  Outcome: Progressing  Goal: Recognize dysfunctional thoughts, communicate reality-based thoughts at the time of discharge  Description: Interventions:  - Provide medication and psycho-education to assist patient in compliance and developing insight into his/her illness   Outcome: Progressing  Goal: Complete daily ADLs, including personal hygiene independently, as able  Description: Interventions:  - Observe, teach, and assist patient with ADLS  - Monitor and promote a balance of rest/activity, with adequate nutrition and elimination   Outcome: Progressing      Problem: Ineffective Coping  Goal: Identifies ineffective coping skills  Outcome: Progressing  Goal: Demonstrates healthy coping skills  Outcome: Progressing  Goal: Participates in unit activities  Description: Interventions:  - Provide therapeutic environment   - Provide required programming   - Redirect inappropriate behaviors   Outcome: Progressing  Goal: Patient/Family participate in treatment and DC plans  Description: Interventions:  - Provide therapeutic environment  Outcome: Progressing  Goal: Patient/Family verbalizes awareness of resources  Outcome: Progressing  Goal: Understands least restrictive measures  Description: Interventions:  - Utilize least restrictive behavior  Outcome: Progressing  Goal: Free from restraint events  Description: - Utilize least restrictive measures   - Provide behavioral interventions   - Redirect inappropriate behaviors   Outcome: Progressing     Problem: Risk for Self Injury/Neglect  Goal: Treatment Goal: Remain safe during length of stay, learn and adopt new coping skills, and be free of self-injurious ideation, impulses and acts at the time of discharge  Outcome: Progressing  Goal: Verbalize thoughts and feelings  Description: Interventions:  - Assess and re-assess patient's lethality and potential for self-injury  - Engage patient in 1:1 interactions, daily, for a minimum of 15 minutes  - Encourage patient to express feelings, fears, frustrations, hopes  - Establish rapport/trust with patient   Outcome: Progressing  Goal: Refrain from harming self  Description: Interventions:  - Monitor patient closely, per order  - Develop a trusting relationship  - Supervise medication ingestion, monitor effects and side effects   Outcome: Progressing  Goal: Attend and participate in unit activities, including therapeutic, recreational, and educational groups  Description: Interventions:  - Provide therapeutic and educational activities daily, encourage attendance and participation,  and document same in the medical record  - Obtain collateral information, encourage visitation and family involvement in care   Outcome: Progressing  Goal: Recognize maladaptive responses and adopt new coping mechanisms  Outcome: Progressing     Problem: Depression  Goal: Treatment Goal: Demonstrate behavioral control of depressive symptoms, verbalize feelings of improved mood/affect, and adopt new coping skills prior to discharge  Outcome: Progressing  Goal: Verbalize thoughts and feelings  Description: Interventions:  - Assess and re-assess patient's level of risk   - Engage patient in 1:1 interactions, daily, for a minimum of 15 minutes   - Encourage patient to express feelings, fears, frustrations, hopes   Outcome: Progressing  Goal: Refrain from isolation  Description: Interventions:  - Develop a trusting relationship   - Encourage socialization   Outcome: Progressing  Goal: Refrain from self-neglect  Outcome: Progressing  Goal: Attend and participate in unit activities, including therapeutic, recreational, and educational groups  Description: Interventions:  - Provide therapeutic and educational activities daily, encourage attendance and participation, and document same in the medical record   Outcome: Progressing     Problem: Anxiety  Goal: Anxiety is at manageable level  Description: Interventions:  - Assess and monitor patient's anxiety level.   - Monitor for signs and symptoms (heart palpitations, chest pain, shortness of breath, headaches, nausea, feeling jumpy, restlessness, irritable, apprehensive).   - Collaborate with interdisciplinary team and initiate plan and interventions as ordered.  - [place] patient to unit/surroundings  - Explain treatment plan  - Encourage participation in care  - Encourage verbalization of concerns/fears  - Identify coping mechanisms  - Assist in developing anxiety-reducing skills  - Administer/offer alternative therapies  - Limit or eliminate stimulants  Outcome:  Progressing     Problem: SAFETY,RESTRAINT: NV/NON-SELF DESTRUCTIVE BEHAVIOR  Goal: Remains free of harm/injury (restraint for non violent/non self-detsructive behavior)  Description: INTERVENTIONS:  - Instruct patient/family regarding restraint use   - Assess and monitor physiologic and psychological status   - Provide interventions and comfort measures to meet assessed patient needs   - Identify and implement measures to help patient regain control  - Assess readiness for release of restraint   Outcome: Progressing  Goal: Returns to optimal restraint-free functioning  Description: INTERVENTIONS:  - Assess the patient's behavior and symptoms that indicate continued need for restraint  - Identify and implement measures to help patient regain control  - Assess readiness for release of restraint   Outcome: Progressing     Problem: Potential for Falls  Goal: Patient will remain free of falls  Description: INTERVENTIONS:  - Educate patient on patient safety including physical limitations  - Instruct patient to call for assistance with activity   - Consult OT/PT to assist with strengthening/mobility   - Keep Call bell within reach  - Keep bed low and locked with side rails adjusted as appropriate  - Keep care items and personal belongings within reach  - Initiate and maintain comfort rounds  - Offer Toileting every 2 Hours, in advance of need  - Initiate/Maintain bed and chair alarm  - Obtain necessary fall risk management equipment: walker, wheelchair  - Apply yellow socks and bracelet for high fall risk patients  - Patient moved to room near nurses station  Outcome: Progressing     Problem: Nutrition/Hydration-ADULT  Goal: Nutrient/Hydration intake appropriate for improving, restoring or maintaining nutritional needs  Description: Monitor and assess patient's nutrition/hydration status for malnutrition. Collaborate with interdisciplinary team and initiate plan and interventions as ordered.  Monitor patient's weight and  dietary intake as ordered or per policy. Utilize nutrition screening tool and intervene as necessary. Determine patient's food preferences and provide high-protein, high-caloric foods as appropriate.     INTERVENTIONS:  - Monitor oral intake, urinary output, labs, and treatment plans  - Assess nutrition and hydration status and recommend course of action  - Evaluate amount of meals eaten  - Assist patient with eating if necessary   - Allow adequate time for meals  - Recommend/ encourage appropriate diets, oral nutritional supplements, and vitamin/mineral supplements  - Order, calculate, and assess calorie counts as needed  - Recommend, monitor, and adjust tube feedings and TPN/PPN based on assessed needs  - Assess need for intravenous fluids  - Provide specific nutrition/hydration education as appropriate  - Include patient/family/caregiver in decisions related to nutrition  Outcome: Progressing

## 2024-12-11 NOTE — PROGRESS NOTES
12/11/24 1100   Activity/Group Checklist   Group Self Esteem  (card task and self esteem quiz)   Attendance Attended   Attendance Duration (min) 46-60   Interactions Interacted appropriately  (Pt. disclosed that fear of what others think of her is on her mind at times effecting her lowered self esteem.)   Affect/Mood Appropriate;Constricted   Goals Achieved Discussed coping strategies;Identified feelings;Able to engage in interactions;Able to listen to others

## 2024-12-11 NOTE — NURSING NOTE
Care assumed at 1515. She denies current anxiety, depression, SI, SH, HI, and AVH. She was medication compliant and did not request PRNs. She asked this RN if she was still receiving depakote at dinnertime, and this RN stated that she was not scheduled, which she said she was happy about. She attended dinner in the milieu and returned to her afterwards to rest. She denies new concerns at this time.    I performed a history and physical exam of the patient and discussed their management with the resident/fellow/ACP/student. I have reviewed the resident/fellow/ACP/student note and agree with the documented findings and plan of care, except as noted. I have personally performed a substantive portion of the visit including all aspects of the medical decision making. My medical decision making and observations are found above. Please refer to any progress notes for updates on clinical course.     See HPI/ROS/PE/MDM written by me

## 2024-12-11 NOTE — ASSESSMENT & PLAN NOTE
No medication changes today  Depakote to 250 mg nightly-12/6/2024  Most recent VPA level 54 on 10/12/24  CMP on 10/12/24 reviewed, WNL  Clozaril 900 mg nightly for schizoaffective disorder  Clozaril monitoring:   CRP, CBC/diff, CK QMon for monitoring  ANC 12/9/24- 3.50  Most recent ECG reviewed 9/9/24 - normal ECG, NSR  Clozaril increased to 900 mg nightly on 10/29/24 - Clozaril level 1076 on 12/5/24 - will continue to monitor the level  Risperdal discontinued on 10/28/24 for moderate affectivity  Ativan changed to Klonopin 0.5 mg daily/ 1 mg qhs    Continue melatonin 3 mg nightly for interrupted sleep  Continue prazosin 5 mg nightly for PTSD associated nightmares; doses to be adjusted as indicated   Continue trazodone 50 mg nightly for insomnia  Discharge disposition: D/C 1/6 with ICM to home

## 2024-12-11 NOTE — CASE MANAGEMENT
Cm met with pt, reviewed referral forms for [facility] and [facility]. Pt reports preference for [facility] due to not wanting to leave home. Pt reports being aware of her ICM worker Conchita wanting pt to attend [facility] program. Pt reports will then agree to [facility] as primary referral.  Pt signed CHANCE for [facility] and [facility]. Applications completed for [facility] and [facility].   CM sent [facility] referral to Merari Andrade via email: [email or web address]  Cy informed by Mandy at [facility] that pt would not be able to attend [facility] and have [facility] at same time due to double billing.

## 2024-12-11 NOTE — NURSING NOTE
Patient withdrawn to room all evening only out for meals. Denies all s/s including SI/HI. Cooperative and medication compliant. Monitor for safety and support.

## 2024-12-11 NOTE — PROGRESS NOTES
Chief Complaint   Patient presents with   • Office Visit     Ear issue       HISTORY OF PRESENT ILLNESS:  Delaney is a 44 year old female who presents with the following:  left ear pain. Onset of symptoms:  1 + weeks    Symptoms:  Pain, Pressure and Plugged sensation  Associated Symptoms:  Nasal congestion, cough  Previous Otitis Media:  Yes - When? 2019    REVIEW OF SYSTEMS:  A three point review of systems was performed.  All pertinent positives and negatives were noted in the HPI (History of Present Illness).     PHYSICAL EXAMINATION:   Blood pressure 96/64, pulse 72, temperature 99 °F (37.2 °C), temperature source Oral, height 5' 4.37\" (1.635 m), weight 59.1 kg (130 lb 4.7 oz), SpO2 97 %, currently breastfeeding.  General Appearance:  well developed, well nourished.  Eyes:  Pupils equal, sclerae clear, conjunctivae pink, no drainage noted bilaterally.  Ears:  right tympanic membrane - NORMAL, left tympanic membrane - erythematous and bulging  Nose:  nares normal  Throat:  Tonsils normal  Head/Neck Lymph Nodes:  no lymphadenopathy  Lungs:  lungs are clear to auscultation   Heart: S1S2, RRR    ASSESSMENT AND PLAN:      Delaney was seen today for office visit.    Diagnoses and all orders for this visit:    Acute otitis media, unspecified otitis media type  -     amoxicillin-clavulanate (Augmentin) 875-125 MG per tablet; Take 1 tablet by mouth every 12 hours for 10 days. Take with food.      Return if symptoms worsen or fail to improve.       Pt attends groups.  Pt pleasant and cooperative  Pt  able to identify positive self talk.  Pt shared multiple positive affirmations and shared she enjoys her cat.  Pt less anxious.   Pt dressed in hospital gown.      12/11/24 1000   Activity/Group Checklist   Group Other (Comment)  (Community meeting: Self care action plan)   Attendance Attended   Attendance Duration (min) 31-45   Interactions Interacted appropriately   Affect/Mood Bright;Appropriate   Goals Achieved Identified feelings;Identified triggers;Identified relapse prevention strategies;Able to listen to others;Able to reflect/comment on own behavior;Able to engage in interactions;Able to manage/cope with feelings;Verbalized increased hopefulness;Able to self-disclose;Able to recieve feedback;Able to give feedback to another;Increased hopefulness;Discussed self-esteem issues;Discussed coping strategies

## 2024-12-11 NOTE — TREATMENT TEAM
12/11/24 0800   Team Meeting   Meeting Type Daily Rounds   Team Members Present   Team Members Present Physician;Nurse;;;Occupational Therapist   Physician Team Member Dr. Banuelos / Dr. Hurst / Dr. Bland / JAIR Ziegler   Nursing Team Member David/Keith   Care Management Team Member Manju / Tee   Social Work Team Member Anastacio   OT Team Member Eduardo   Patient/Family Present   Patient Present No   Patient's Family Present No     Pt is reported to deny all signs and symptoms, Pt did not attend groups. Discharge pending community supports.

## 2024-12-11 NOTE — TREATMENT TEAM
12/11/24 0839   Pain Assessment   Pain Assessment Tool 0-10   Pain Score 10 - Worst Possible Pain   Pain Location/Orientation Location: Generalized   Pain Radiating Towards na   Pain Onset/Description Onset: Ongoing   Effect of Pain on Daily Activities mood   Patient's Stated Pain Goal No pain   Hospital Pain Intervention(s) Medication (See MAR)   Multiple Pain Sites No     PRN Tylenol 975 given fro 10/10 generalized pain

## 2024-12-11 NOTE — NURSING NOTE
Patient visible, pleasant, and cooperative. Patient denies all psych s/s. Patient med compliant. VSS. Appetite intact with 100% breakfast eaten. PRN Tylenol 975 given for ongoing generalized pain. VSS. Continual safety checks ongoing

## 2024-12-11 NOTE — PROGRESS NOTES
Progress Note - Behavioral Health   Name: Meli Nowak 59 y.o. female I MRN: [id number]  Unit/Bed#: [facility] 651-02 I Date of Admission: 7/23/2024   Date of Service: 12/11/2024 I Hospital Day: 141     Assessment & Plan  Schizoaffective disorder, bipolar type (HCC)  No medication changes today  Depakote to 250 mg nightly-12/6/2024  Most recent VPA level 54 on 10/12/24  CMP on 10/12/24 reviewed, WNL  Clozaril 900 mg nightly for schizoaffective disorder  Clozaril monitoring:   CRP, CBC/diff, CK QMon for monitoring  ANC 12/9/24- 3.50  Most recent ECG reviewed 9/9/24 - normal ECG, NSR  Clozaril increased to 900 mg nightly on 10/29/24 - Clozaril level 1076 on 12/5/24 - will continue to monitor the level  Risperdal discontinued on 10/28/24 for moderate affectivity  Ativan changed to Klonopin 0.5 mg daily/ 1 mg qhs    Continue melatonin 3 mg nightly for interrupted sleep  Continue prazosin 5 mg nightly for PTSD associated nightmares; doses to be adjusted as indicated   Continue trazodone 50 mg nightly for insomnia  Discharge disposition: D/C 1/6 with ICM to home        ASHLIE (obstructive sleep apnea)  - f/u w/ Sleep Medicine in outpatient setting and consider CPAP as indicated          Plan   Progress Toward Goals:   Meli is progressing towards goals of inpatient psychiatric treatment by continued medication compliance and is attending therapeutic modalities on the milieu. However, the patient continues to require inpatient psychiatric hospitalization for continued medication management and titration to optimize symptom reduction, improve sleep hygiene, and demonstrate adequate self-care.    Recommended Treatment: Treatment plan and medication changes discussed and per the attending physician the plan is:    1.Continue with group therapy, milieu therapy and occupational therapy  2.Behavioral Health checks every 7 minutes  3.Continue frequent safety checks and vitals per unit protocol  4.Continue with SLIM medical management as  indicated  5.Continue with current medication regimen  6.Will review labs in the a.m.  7.Disposition Planning: Discharge planning and efforts remain ongoing    Behavioral Health Medications: all current active meds have been reviewed and continue current psychiatric medications.  Current Facility-Administered Medications   Medication Dose Route Frequency Provider Last Rate    acetaminophen  650 mg Oral Q4H PRN Marie Ziegler, CRNP      acetaminophen  650 mg Oral Q4H PRN Marie Ziegler, CRNP      acetaminophen  975 mg Oral Q6H PRN Marie Ziegler, CRNP      albuterol  2 puff Inhalation Q4H PRN Kristen Logan, CRNP      aluminum-magnesium hydroxide-simethicone  30 mL Oral Q4H PRN Parris Bolanos, CRNP      Artificial Tears  1 drop Both Eyes Q6H PRN Dereje Banuelos MD      atorvastatin  10 mg Oral Daily Marie Ziegler, CRNP      bisacodyl  10 mg Oral Daily PRN Kristen Logan, CRNP      bisacodyl  10 mg Rectal Daily PRN Marie Ziegler, CRNP      carvedilol  6.25 mg Oral BID With Meals Marie Ziegler, CRNP      clonazePAM  0.5 mg Oral Daily Marie Ziegler, CRNP      clonazePAM  1 mg Oral HS Marie Ziegler, CRNP      cloZAPine  900 mg Oral HS Marie Ziegler, CRNP      cyanocobalamin  1,000 mcg Oral Daily Marie Ziegler, CRNP      Diclofenac Sodium  2 g Topical 4x Daily PRN Marie Ziegler, CRNP      famotidine  20 mg Oral BID Shan Fitzgerald DO      fluticasone  1 puff Inhalation Daily Marie Ziegler, CRNP      hydrOXYzine HCL  25 mg Oral Q6H PRN Max 4/day Marie Ziegler, CRNP      hydrOXYzine HCL  50 mg Oral Q6H PRN Max 4/day Marie Ziegler, CRNP      LORazepam  1 mg Intramuscular Q6H PRN Max 3/day Marie Ziegler, CRNP      LORazepam  1 mg Oral Q6H PRN Max 3/day Marie Ziegler, CRNP      melatonin  3 mg Oral HS Marie Ziegler, CRNP      mirtazapine  7.5 mg Oral HS PRN Marie Ziegler, CRNP      Multivitamin  15 mL Oral Daily Marie Ziegler, CRNP      OLANZapine  2.5 mg Oral Q6H PRN  Dereje Banuelos MD      Or    OLANZapine  2.5 mg Intramuscular Q6H PRN Dereje Banuelos MD      OLANZapine  5 mg Oral Q6H PRN Dereje Banuelos MD      Or    OLANZapine  5 mg Intramuscular Q6H PRN Dereje Banuelos MD      ondansetron  4 mg Oral Q6H PRN Darin Lawton MD      polyethylene glycol  17 g Oral Daily JOSE ELIAS Ortega      polyethylene glycol  17 g Oral Daily PRN JOSE ELIAS Figueroa      prazosin  5 mg Oral HS JOSE ELIAS Figueroa      senna-docusate sodium  2 tablet Oral HS Rehana Borrego PA-C      traZODone  50 mg Oral HS JOSE ELIAS Figueroa         Risks / Benefits of Treatment:  Risks, benefits, and possible side effects of medications explained to patient and patient verbalizes understanding and agreement for treatment.       Subjective    Patient was seen today for continuation of care, records reviewed and patient was discussed with the morning case review team.    Meli was seen today for psychiatric follow-up.  On assessment today, Meli was pleasant.  Stating that she her ICM will be visiting today, patient in agreement to follow-up services including [facility], [facility] rehab and [facility].  No current psychiatric symptoms reported, she does report significant decrease in auditory hallucinations.  Sleep and appetite unchanged. Meli denies acute suicidal/self-harm ideation/intent/plan upon direct inquiry at this time.  Meli remains behaviorally appropriate, no agitation or aggression noted on exam or in report.  Meli also denies HI/AH/VH, and does not appear overtly manic.  No overt delusions or paranoia are verbalized. Impulse control is intact.  Meli remains adherent to his current psychotropic medication regimen and denies any side effects from medications, as well as none noted on exam.    Meli is stable and ready for discharge, however, even at baseline, Meli continues with poor insight, judgement and coping skills that pose a risk to patient in a homeless  shelter, or otherwise unsupervised setting. Meli is likely to decompensate rapidly, thus, becoming a risk of danger to self/others. Case management is assertively/actively working on securing the necessary level of care     Psychiatric Review of Systems:  Behavior over the last 24 hours:  unchanged.   Sleep: intact  Appetite: intact  Medication side effects: none  ROS: no complaints, denies shortness of breath or chest pain and all other systems are negative for acute changes        Objective    Vitals:  Vitals:    12/11/24 0740   BP: 128/64   Pulse: 76   Resp: 16   Temp: 98 °F (36.7 °C)   SpO2: 92%       Laboratory Results:  I have personally reviewed all pertinent laboratory/tests results.  Most Recent Labs:   Lab Results   Component Value Date    WBC 7.75 12/09/2024    RBC 4.22 12/09/2024    HGB 13.4 12/09/2024    HCT 41.6 12/09/2024     12/09/2024    RDW 13.6 12/09/2024    NEUTROABS 3.50 12/09/2024    SODIUM 138 10/12/2024    K 4.1 10/12/2024     10/12/2024    CO2 29 10/12/2024    BUN 18 10/12/2024    CREATININE 0.97 10/12/2024    GLUC 146 (H) 10/12/2024    GLUF 98 08/10/2024    CALCIUM 9.2 10/12/2024    AST 12 (L) 10/12/2024    ALT 12 10/12/2024    ALKPHOS 84 10/12/2024    TP 6.7 10/12/2024    ALB 3.9 10/12/2024    TBILI 0.26 10/12/2024    VALPROICTOT 54 10/12/2024    AMMONIA 32 07/03/2024    [id number] 2.000 07/24/2024    HGBA1C 6.4 (H) 05/03/2024     05/03/2024       Mental Status Evaluation:  Appearance:  disheveled, marginal hygiene   Behavior:  cooperative, calm   Speech:  normal rate and volume   Mood:  less anxious, less depressed   Affect:  constricted   Thought Process:  perseverative, concrete   Thought Content:  Episcopal and somatic preoccupation, paranoid ideation   Perceptual Disturbances: auditory hallucinations still present, but less frequent   Risk Potential: Suicidal ideation - None  Homicidal ideation - None  Potential for aggression - No   Memory:  recent and remote  memory grossly intact   Sensorium  person, place, and time/date      Consciousness:  alert and awake   Attention: attention span and concentration are age appropriate   Insight:  limited   Judgment: limited   Gait/Station: normal gait/station   Motor Activity: no abnormal movements       Counseling / Coordination of Care:  Patient's progress reviewed with nursing staff.  Medications, treatment progress and treatment plan reviewed with patient.  Supportive counseling provided to the patient.    This note was completed in part utilizing Dragon dictation Software. Grammatical, translation, syntax errors, random word insertions, spelling mistakes, and incomplete sentences may be an occasional consequence of this system secondary to software limitations with voice recognition, ambient noise, and hardware issues. If you have any questions or concerns about the content, text, or information contained within the body of this dictation, please contact the provider for clarification

## 2024-12-11 NOTE — TREATMENT TEAM
12/11/24 0942   Pain Assessment Post Intervention   Pain Assessment Tool Used: 0-10   Post Intervention Pain Score 8   Post Intervention Pain Location/Orientation Location: Generalized   Response to Interventions mildly effective     Patient reported PRN Tylenol 975 mildly effective

## 2024-12-12 LAB
ATRIAL RATE: 81 BPM
ATRIAL RATE: 81 BPM
P AXIS: 41 DEGREES
P AXIS: 41 DEGREES
PR INTERVAL: 154 MS
PR INTERVAL: 156 MS
QRS AXIS: 60 DEGREES
QRS AXIS: 61 DEGREES
QRSD INTERVAL: 92 MS
QRSD INTERVAL: 94 MS
QT INTERVAL: 374 MS
QT INTERVAL: 382 MS
QTC INTERVAL: 435 MS
QTC INTERVAL: 444 MS
T WAVE AXIS: 58 DEGREES
T WAVE AXIS: 62 DEGREES
VENTRICULAR RATE: 81 BPM
VENTRICULAR RATE: 81 BPM

## 2024-12-12 PROCEDURE — 93005 ELECTROCARDIOGRAM TRACING: CPT

## 2024-12-12 PROCEDURE — 93010 ELECTROCARDIOGRAM REPORT: CPT | Performed by: STUDENT IN AN ORGANIZED HEALTH CARE EDUCATION/TRAINING PROGRAM

## 2024-12-12 PROCEDURE — 99232 SBSQ HOSP IP/OBS MODERATE 35: CPT | Performed by: STUDENT IN AN ORGANIZED HEALTH CARE EDUCATION/TRAINING PROGRAM

## 2024-12-12 RX ORDER — DIVALPROEX SODIUM 500 MG/1
500 TABLET, FILM COATED, EXTENDED RELEASE ORAL
Status: DISCONTINUED | OUTPATIENT
Start: 2024-12-12 | End: 2024-12-14

## 2024-12-12 RX ADMIN — CLOZAPINE 900 MG: 200 TABLET ORAL at 21:11

## 2024-12-12 RX ADMIN — Medication 15 ML: at 08:43

## 2024-12-12 RX ADMIN — CARVEDILOL 6.25 MG: 6.25 TABLET, FILM COATED ORAL at 08:43

## 2024-12-12 RX ADMIN — PRAZOSIN HYDROCHLORIDE 5 MG: 5 CAPSULE ORAL at 21:11

## 2024-12-12 RX ADMIN — CARVEDILOL 6.25 MG: 6.25 TABLET, FILM COATED ORAL at 16:25

## 2024-12-12 RX ADMIN — ATORVASTATIN CALCIUM 10 MG: 10 TABLET, FILM COATED ORAL at 08:43

## 2024-12-12 RX ADMIN — SENNOSIDES AND DOCUSATE SODIUM 2 TABLET: 8.6; 5 TABLET ORAL at 21:11

## 2024-12-12 RX ADMIN — CLONAZEPAM 1 MG: 1 TABLET ORAL at 21:12

## 2024-12-12 RX ADMIN — ACETAMINOPHEN 975 MG: 325 TABLET, FILM COATED ORAL at 09:43

## 2024-12-12 RX ADMIN — FAMOTIDINE 20 MG: 20 TABLET ORAL at 08:43

## 2024-12-12 RX ADMIN — DIVALPROEX SODIUM 500 MG: 500 TABLET, EXTENDED RELEASE ORAL at 21:12

## 2024-12-12 RX ADMIN — TRAZODONE HYDROCHLORIDE 50 MG: 50 TABLET ORAL at 21:12

## 2024-12-12 RX ADMIN — FAMOTIDINE 20 MG: 20 TABLET ORAL at 16:26

## 2024-12-12 RX ADMIN — CYANOCOBALAMIN TAB 1000 MCG 1000 MCG: 1000 TAB at 08:43

## 2024-12-12 RX ADMIN — ALBUTEROL SULFATE 2 PUFF: 90 AEROSOL, METERED RESPIRATORY (INHALATION) at 21:42

## 2024-12-12 RX ADMIN — FLUTICASONE FUROATE 1 PUFF: 100 POWDER RESPIRATORY (INHALATION) at 08:44

## 2024-12-12 RX ADMIN — ACETAMINOPHEN 975 MG: 325 TABLET, FILM COATED ORAL at 21:42

## 2024-12-12 RX ADMIN — HYDROXYZINE HYDROCHLORIDE 25 MG: 25 TABLET ORAL at 16:25

## 2024-12-12 RX ADMIN — CLONAZEPAM 0.5 MG: 0.5 TABLET ORAL at 08:43

## 2024-12-12 RX ADMIN — POLYETHYLENE GLYCOL 3350 17 G: 17 POWDER, FOR SOLUTION ORAL at 08:42

## 2024-12-12 RX ADMIN — MELATONIN TAB 3 MG 3 MG: 3 TAB at 21:12

## 2024-12-12 NOTE — NURSING NOTE
"Patient tearful, paranoid, and religiously preoccupied. Admitted to \"bad thoughts\" but declined to elaborate. On further conversation patient stated she has AH but doesn't want them to go away as they say good things. Patient reports today that they are saying bad things and fearful she is going to hell. Patient states \"hell is not a place of flames, it is a state of no love.\" Reports AH having been telling her she is loved and make her feel comforted. States today they are telling her \"it was lies, that was Lucifer talking.\" Patient states she met another patient at a different hospital and that's whose voice she hears but knows this patient was actually Lucifer. Patient begging RN not to report these symptoms as she thinks it will further delay her D/C. Patient currently fearful of going to hell and/or dying in her sleep. Reports prayer was not effective for coping. Routine klonopin administered and mouth check preformed.   "

## 2024-12-12 NOTE — CASE MANAGEMENT
CM received email from Conchita Hall regarding concerns for pt's lost dentures. CM notified Dona ADHIKARI, Unit RN Manager.

## 2024-12-12 NOTE — NURSING NOTE
Patient is anxious, paranoid, and religiously preoccupied. Withdrawn to bedroom. Visible briefly pacing halls. Appetite and hygiene is fair. Compliant w/ medication and mouth checks. Anxiety and AH present. Continuous rounding maintained.

## 2024-12-12 NOTE — NURSING NOTE
"Discussed effectiveness of PRN tylenol. Patient stated \"I don't know if it helps. I don't know how I feel.\" Remains anxious and pacing halls.   "

## 2024-12-12 NOTE — CASE MANAGEMENT
LAURIE rec'd email from cyndie Grigsby at [facility]; reports plans to come to hospital for interview with pt on 12/17 at 1 pm.   LAURIE sent [facility] referral to Cyndie via secure email: Jasmin Grigsby <[email or web address]>      LAURIE rec'd email from pt's [facility] Conchita stating plans to see pt tomorrow. Pt notified of visit.

## 2024-12-12 NOTE — NURSING NOTE
"Meli is pleasant and cooperative. Meli is more active, took a shower and brushed her teeth. Meli denies all psych s/s but reports that she has \"occasional voices.\" She also reports that the voices are pleasant. Meli states she is afraid to tell the doctor as she is \"getting close to d/c and does not want it to get canceled.\" Meli is medication compliant. Will continue to monitor and support during treatment.  "

## 2024-12-12 NOTE — PROGRESS NOTES
Progress Note - Behavioral Health   Name: Meli Nowak 59 y.o. female I MRN: [id number]  Unit/Bed#: [facility] 651-02 I Date of Admission: 7/23/2024   Date of Service: 12/12/2024 I Hospital Day: 142     Assessment & Plan  Schizoaffective disorder, bipolar type (HCC)  No medication changes today  Depakote to 500 mg qhs (12/12/24)  Most recent VPA level 54 on 10/12/24  CMP on 10/12/24 reviewed, WNL  Clozaril 900 mg nightly for schizoaffective disorder  Clozaril monitoring:   CRP, CBC/diff, CK QMon for monitoring  ANC 12/9/24- 3.50  Most recent ECG reviewed 9/9/24 - normal ECG, NSR  Clozaril increased to 900 mg nightly on 10/29/24 - Clozaril level 1076 on 12/5/24 - will continue to monitor the level  Risperdal discontinued on 10/28/24 for moderate affectivity  Ativan changed to Klonopin 0.5 mg daily/ 1 mg qhs    Continue melatonin 3 mg nightly for interrupted sleep  Continue prazosin 5 mg nightly for PTSD associated nightmares; doses to be adjusted as indicated   Continue trazodone 50 mg nightly for insomnia  Discharge disposition: D/C 1/6 with ICM to home        ASHLIE (obstructive sleep apnea)  - f/u w/ Sleep Medicine in outpatient setting and consider CPAP as indicated          Plan   Progress Toward Goals:   Meli is progressing towards goals of inpatient psychiatric treatment by continued medication compliance and is attending therapeutic modalities on the milieu. However, the patient continues to require inpatient psychiatric hospitalization for continued medication management and titration to optimize symptom reduction, improve sleep hygiene, and demonstrate adequate self-care.    Recommended Treatment: Treatment plan and medication changes discussed and per the attending physician the plan is:    1.Continue with group therapy, milieu therapy and occupational therapy  2.Behavioral Health checks every 7 minutes  3.Continue frequent safety checks and vitals per unit protocol  4.Continue with SLIM medical management as  indicated  5.Continue with current medication regimen  6.Will review labs in the a.m.  7.Disposition Planning: Discharge planning and efforts remain ongoing    Behavioral Health Medications: all current active meds have been reviewed and continue current psychiatric medications.  Current Facility-Administered Medications   Medication Dose Route Frequency Provider Last Rate    acetaminophen  650 mg Oral Q4H PRN Marie Ziegler, CRNP      acetaminophen  650 mg Oral Q4H PRN Marie Ziegler, CRNP      acetaminophen  975 mg Oral Q6H PRN Marie Ziegler, CRNP      albuterol  2 puff Inhalation Q4H PRN Kristen Logan, CRNP      aluminum-magnesium hydroxide-simethicone  30 mL Oral Q4H PRN Parris Bolanos, CRNP      Artificial Tears  1 drop Both Eyes Q6H PRN Dereje Banuelos MD      atorvastatin  10 mg Oral Daily Marie Ziegler, CRNP      bisacodyl  10 mg Oral Daily PRN Kristen Logan, CRNP      bisacodyl  10 mg Rectal Daily PRN Marie Ziegler, CRNP      carvedilol  6.25 mg Oral BID With Meals Marie Ziegler, CRNP      clonazePAM  0.5 mg Oral Daily Marie Ziegler, CRNP      clonazePAM  1 mg Oral HS Marie Ziegler, CRNP      cloZAPine  900 mg Oral HS Marie Ziegler, CRNP      cyanocobalamin  1,000 mcg Oral Daily Marie Ziegler, CRNP      Diclofenac Sodium  2 g Topical 4x Daily PRN Marie Ziegler, CRNP      divalproex sodium  500 mg Oral HS Marie Ziegler, CRNP      famotidine  20 mg Oral BID Shan Fitzgerald DO      fluticasone  1 puff Inhalation Daily Marie Ziegler, CRNP      hydrOXYzine HCL  25 mg Oral Q6H PRN Max 4/day Marie Ziegler, CRNP      hydrOXYzine HCL  50 mg Oral Q6H PRN Max 4/day Marie Ziegler, CRNP      LORazepam  1 mg Intramuscular Q6H PRN Max 3/day Marie Ziegler, CRNP      LORazepam  1 mg Oral Q6H PRN Max 3/day Marie Ziegler, CRNP      melatonin  3 mg Oral HS Marie Ziegler, CRNP      mirtazapine  7.5 mg Oral HS PRN Marie Ziegler, CRNP      Multivitamin  15 mL Oral Daily  "JOSE ELIAS Figueroa      OLANZapine  2.5 mg Oral Q6H PRN Dereje Banuelos MD      Or    OLANZapine  2.5 mg Intramuscular Q6H PRN Dereje Banuelos MD      OLANZapine  5 mg Oral Q6H PRN Dereje Banuelos MD      Or    OLANZapine  5 mg Intramuscular Q6H PRN Dereje Banuelos MD      ondansetron  4 mg Oral Q6H PRN Darin Lawton MD      polyethylene glycol  17 g Oral Daily JOSE ELIAS Ortega      polyethylene glycol  17 g Oral Daily PRN JOSE ELIAS Figueroa      prazosin  5 mg Oral HS JOSE ELIAS Figueroa      senna-docusate sodium  2 tablet Oral HS Rehana Borrego PA-C      traZODone  50 mg Oral HS JOSE ELIAS Figueroa         Risks / Benefits of Treatment:  Risks, benefits, and possible side effects of medications explained to patient and patient verbalizes understanding and agreement for treatment.       Subjective    Patient was seen today for continuation of care, records reviewed and patient was discussed with the morning case review team.    Meli was seen today for psychiatric follow-up.  On assessment today, Meli was seen in her room. More isolated and withdrawn, Meli has not gone to group despite encouragement. Increased paranoia, anxiety and  religiously preoccupation noted today. Meli stated that she fears she will be going to hell. Directly questioned about statement, she then stated \" I mean, I don't think I have done anything bad to go there\". AH also endorsed, she states that she believes the voice is Lucifer and that it is more negative and bothersome today. Therapeutic communication provided, and also counseled on the importance of using coping skills including distraction to assist with auditory hallucinations.  Also consider possible addition of second antipsychotic to assist with symptoms we will initiate Depakote 500 mg nightly for mood stabilization and ruminating thoughts. Meli denies acute suicidal/self-harm ideation/intent/plan upon direct inquiry at this time.  Meli remains " behaviorally appropriate, no agitation or aggression noted on exam or in report. Impulse control is intact.  Meli remains adherent to her current psychotropic medication regimen and denies any side effects from medications, as well as none noted on exam.    Meli is stable and ready for discharge, however, even at baseline, Meli continues with poor insight, judgement and coping skills that pose a risk to patient in a homeless shelter, or otherwise unsupervised setting. Meli is likely to decompensate rapidly, thus, becoming a risk of danger to self/others. Case management is assertively/actively working on securing the necessary level of care     Psychiatric Review of Systems:  Behavior over the last 24 hours:  unchanged.   Sleep: good  Appetite: good  Medication side effects: none  ROS: no complaints, denies shortness of breath or chest pain and all other systems are negative for acute changes        Objective    Vitals:  Vitals:    12/12/24 0747   BP: 119/70   Pulse: 84   Resp: 18   Temp: 98.3 °F (36.8 °C)   SpO2: 96%       Laboratory Results:  I have personally reviewed all pertinent laboratory/tests results.  Most Recent Labs:   Lab Results   Component Value Date    WBC 7.75 12/09/2024    RBC 4.22 12/09/2024    HGB 13.4 12/09/2024    HCT 41.6 12/09/2024     12/09/2024    RDW 13.6 12/09/2024    NEUTROABS 3.50 12/09/2024    SODIUM 138 10/12/2024    K 4.1 10/12/2024     10/12/2024    CO2 29 10/12/2024    BUN 18 10/12/2024    CREATININE 0.97 10/12/2024    GLUC 146 (H) 10/12/2024    GLUF 98 08/10/2024    CALCIUM 9.2 10/12/2024    AST 12 (L) 10/12/2024    ALT 12 10/12/2024    ALKPHOS 84 10/12/2024    TP 6.7 10/12/2024    ALB 3.9 10/12/2024    TBILI 0.26 10/12/2024    VALPROICTOT 54 10/12/2024    AMMONIA 32 07/03/2024    [id number] 2.000 07/24/2024    HGBA1C 6.4 (H) 05/03/2024     05/03/2024       Mental Status Evaluation:  Appearance:  disheveled, marginal hygiene   Behavior:  bizarre, guarded    Speech:  normal rate and volume   Mood:  anxious   Affect:  constricted   Thought Process:  perseverative   Thought Content:  Presybeterian and somatic preoccupation, paranoid ideation, negative thoughts, ruminating thoughts   Perceptual Disturbances: auditory hallucinations still present, but less frequent, appears preoccupied   Risk Potential: Suicidal ideation - None  Homicidal ideation - None  Potential for aggression - No   Memory:  recent and remote memory grossly intact   Sensorium  person, place, and time/date      Consciousness:  alert and awake   Attention: attention span and concentration are age appropriate   Insight:  limited   Judgment: limited   Gait/Station: normal gait/station   Motor Activity: no abnormal movements       Counseling / Coordination of Care:  Patient's progress reviewed with nursing staff.  Medications, treatment progress and treatment plan reviewed with patient.  Supportive counseling provided to the patient.    This note was completed in part utilizing Dragon dictation Software. Grammatical, translation, syntax errors, random word insertions, spelling mistakes, and incomplete sentences may be an occasional consequence of this system secondary to software limitations with voice recognition, ambient noise, and hardware issues. If you have any questions or concerns about the content, text, or information contained within the body of this dictation, please contact the provider for clarification

## 2024-12-12 NOTE — NURSING NOTE
Patient fearful and somatic. C/o arm/leg pain, increased HR, and feeling like she's going to die. Vitals WNL. Manual HR 96. Routine coreg and Pepcid administered. PRN atarax 25mg administered at 1625. Results pending.

## 2024-12-12 NOTE — PLAN OF CARE
Problem: Prexisting or High Potential for Compromised Skin Integrity  Goal: Skin integrity is maintained or improved  Description: INTERVENTIONS:  - Identify patients at risk for skin breakdown  - Assess and monitor skin integrity  - Assess and monitor nutrition and hydration status  - Monitor labs   - Assess for incontinence   - Turn and reposition patient  - Assist with mobility/ambulation  - Relieve pressure over bony prominences  - Avoid friction and shearing  - Provide appropriate hygiene as needed including keeping skin clean and dry  - Evaluate need for skin moisturizer/barrier cream  - Collaborate with interdisciplinary team   - Patient/family teaching  - Consider wound care consult   Outcome: Progressing     Problem: Alteration in Thoughts and Perception  Goal: Verbalize thoughts and feelings  Description: Interventions:  - Promote a nonjudgmental and trusting relationship with the patient through active listening and therapeutic communication  - Assess patient's level of functioning, behavior and potential for risk  - Engage patient in 1 on 1 interactions  - Encourage patient to express fears, feelings, frustrations, and discuss symptoms    - [place] patient to reality, help patient recognize reality-based thinking   - Administer medications as ordered and assess for potential side effects  - Provide the patient education related to the signs and symptoms of the illness and desired effects of prescribed medications  Outcome: Progressing  Goal: Refrain from acting on delusional thinking/internal stimuli  Description: Interventions:  - Monitor patient closely, per order   - Utilize least restrictive measures   - Set reasonable limits, give positive feedback for acceptable   - Administer medications as ordered and monitor of potential side effects  Outcome: Progressing  Goal: Attend and participate in unit activities, including therapeutic, recreational, and educational groups  Description:  Interventions:  -Encourage Visitation and family involvement in care  Outcome: Progressing  Goal: Complete daily ADLs, including personal hygiene independently, as able  Description: Interventions:  - Observe, teach, and assist patient with ADLS  - Monitor and promote a balance of rest/activity, with adequate nutrition and elimination   Outcome: Progressing     Problem: Ineffective Coping  Goal: Identifies ineffective coping skills  Outcome: Progressing  Goal: Free from restraint events  Description: - Utilize least restrictive measures   - Provide behavioral interventions   - Redirect inappropriate behaviors   Outcome: Progressing     Problem: Risk for Self Injury/Neglect  Goal: Refrain from harming self  Description: Interventions:  - Monitor patient closely, per order  - Develop a trusting relationship  - Supervise medication ingestion, monitor effects and side effects   Outcome: Progressing     Problem: Depression  Goal: Refrain from self-neglect  Outcome: Progressing     Problem: Potential for Falls  Goal: Patient will remain free of falls  Description: INTERVENTIONS:  - Educate patient on patient safety including physical limitations  - Instruct patient to call for assistance with activity   - Consult OT/PT to assist with strengthening/mobility   - Keep Call bell within reach  - Keep bed low and locked with side rails adjusted as appropriate  - Keep care items and personal belongings within reach  - Initiate and maintain comfort rounds  - Offer Toileting every 2 Hours, in advance of need  - Initiate/Maintain bed and chair alarm  - Obtain necessary fall risk management equipment: walker, wheelchair  - Apply yellow socks and bracelet for high fall risk patients  - Patient moved to room near nurses station  Outcome: Progressing     Problem: Nutrition/Hydration-ADULT  Goal: Nutrient/Hydration intake appropriate for improving, restoring or maintaining nutritional needs  Description: Monitor and assess patient's  nutrition/hydration status for malnutrition. Collaborate with interdisciplinary team and initiate plan and interventions as ordered.  Monitor patient's weight and dietary intake as ordered or per policy. Utilize nutrition screening tool and intervene as necessary. Determine patient's food preferences and provide high-protein, high-caloric foods as appropriate.     INTERVENTIONS:  - Monitor oral intake, urinary output, labs, and treatment plans  - Assess nutrition and hydration status and recommend course of action  - Evaluate amount of meals eaten  - Assist patient with eating if necessary   - Allow adequate time for meals  - Recommend/ encourage appropriate diets, oral nutritional supplements, and vitamin/mineral supplements  - Order, calculate, and assess calorie counts as needed  - Recommend, monitor, and adjust tube feedings and TPN/PPN based on assessed needs  - Assess need for intravenous fluids  - Provide specific nutrition/hydration education as appropriate  - Include patient/family/caregiver in decisions related to nutrition  Outcome: Progressing

## 2024-12-12 NOTE — PROGRESS NOTES
12/12/24 1000   Activity/Group Checklist   Group Community meeting   Attendance Attended   Attendance Duration (min) 46-60   Interactions Interacted appropriately   Affect/Mood Appropriate;Calm   Goals Achieved Identified feelings;Identified triggers;Identified relapse prevention strategies;Able to listen to others;Able to engage in interactions;Able to reflect/comment on own behavior;Able to manage/cope with feelings;Verbalized increased hopefulness;Able to recieve feedback;Able to self-disclose

## 2024-12-12 NOTE — ASSESSMENT & PLAN NOTE
No medication changes today  Depakote to 500 mg qhs (12/12/24)  Most recent VPA level 54 on 10/12/24  CMP on 10/12/24 reviewed, WNL  Clozaril 900 mg nightly for schizoaffective disorder  Clozaril monitoring:   CRP, CBC/diff, CK QMon for monitoring  ANC 12/9/24- 3.50  Most recent ECG reviewed 9/9/24 - normal ECG, NSR  Clozaril increased to 900 mg nightly on 10/29/24 - Clozaril level 1076 on 12/5/24 - will continue to monitor the level  Risperdal discontinued on 10/28/24 for moderate affectivity  Ativan changed to Klonopin 0.5 mg daily/ 1 mg qhs    Continue melatonin 3 mg nightly for interrupted sleep  Continue prazosin 5 mg nightly for PTSD associated nightmares; doses to be adjusted as indicated   Continue trazodone 50 mg nightly for insomnia  Discharge disposition: D/C 1/6 with ICM to home

## 2024-12-12 NOTE — TREATMENT TEAM
12/12/24 1043   Pain Assessment   Pain Rating: FLACC (Rest) - Face 1   Pain Rating: FLACC (Rest) - Legs 1   Pain Rating: FLACC (Rest) - Activity 0   Pain Rating: FLACC (Rest) - Cry 0   Pain Rating: FLACC (Rest) - Consolability 1   Score: FLACC (Rest) 3   Pain Rating: FLACC (Activity) - Face 1   Pain Rating: FLACC (Activity) - Legs 1   Pain Rating: FLACC (Activity) - Activity 0   Pain Rating: FLACC (Activity) - Cry 1   Pain Rating: FLACC (Activity) - Consolability 1   Score: FLACC (Activity) 4   Pain Assessment Post Intervention   Pain Assessment Tool Used: FLACC

## 2024-12-12 NOTE — TREATMENT TEAM
12/12/24 0800   Team Meeting   Meeting Type Daily Rounds   Team Members Present   Team Members Present Physician;Nurse;;;Occupational Therapist   Physician Team Member Dr. Banuelos / Dr. Hurst / Dr. Bland / JAIR Ziegler   Nursing Team Member David/Keith   Care Management Team Member Manju / Tee   Social Work Team Member Anastacio   OT Team Member Eduardo   Patient/Family Present   Patient Present No   Patient's Family Present No     Pt denied all. Pt reported that Pt has AH, though does not wish to tell provider due to not wanting to stay longer, though Pt stated that it does not bother Pt. Pt attending groups.

## 2024-12-13 PROCEDURE — 99232 SBSQ HOSP IP/OBS MODERATE 35: CPT | Performed by: STUDENT IN AN ORGANIZED HEALTH CARE EDUCATION/TRAINING PROGRAM

## 2024-12-13 RX ADMIN — PRAZOSIN HYDROCHLORIDE 5 MG: 5 CAPSULE ORAL at 21:08

## 2024-12-13 RX ADMIN — ATORVASTATIN CALCIUM 10 MG: 10 TABLET, FILM COATED ORAL at 09:27

## 2024-12-13 RX ADMIN — Medication 2 G: at 16:14

## 2024-12-13 RX ADMIN — DIVALPROEX SODIUM 500 MG: 500 TABLET, EXTENDED RELEASE ORAL at 21:08

## 2024-12-13 RX ADMIN — Medication 2 G: at 11:47

## 2024-12-13 RX ADMIN — CARVEDILOL 6.25 MG: 6.25 TABLET, FILM COATED ORAL at 09:27

## 2024-12-13 RX ADMIN — FAMOTIDINE 20 MG: 20 TABLET ORAL at 17:01

## 2024-12-13 RX ADMIN — HYDROXYZINE HYDROCHLORIDE 25 MG: 25 TABLET ORAL at 16:14

## 2024-12-13 RX ADMIN — Medication 15 ML: at 09:28

## 2024-12-13 RX ADMIN — CYANOCOBALAMIN TAB 1000 MCG 1000 MCG: 1000 TAB at 09:27

## 2024-12-13 RX ADMIN — CLOZAPINE 900 MG: 200 TABLET ORAL at 21:08

## 2024-12-13 RX ADMIN — FAMOTIDINE 20 MG: 20 TABLET ORAL at 09:27

## 2024-12-13 RX ADMIN — POLYETHYLENE GLYCOL 3350 17 G: 17 POWDER, FOR SOLUTION ORAL at 09:32

## 2024-12-13 RX ADMIN — CLONAZEPAM 0.5 MG: 0.5 TABLET ORAL at 09:27

## 2024-12-13 RX ADMIN — CARVEDILOL 6.25 MG: 6.25 TABLET, FILM COATED ORAL at 16:14

## 2024-12-13 RX ADMIN — CLONAZEPAM 1 MG: 1 TABLET ORAL at 21:08

## 2024-12-13 RX ADMIN — ACETAMINOPHEN 975 MG: 325 TABLET, FILM COATED ORAL at 09:28

## 2024-12-13 RX ADMIN — SENNOSIDES AND DOCUSATE SODIUM 2 TABLET: 8.6; 5 TABLET ORAL at 21:08

## 2024-12-13 RX ADMIN — TRAZODONE HYDROCHLORIDE 50 MG: 50 TABLET ORAL at 21:08

## 2024-12-13 RX ADMIN — ACETAMINOPHEN 975 MG: 325 TABLET, FILM COATED ORAL at 22:51

## 2024-12-13 RX ADMIN — FLUTICASONE FUROATE 1 PUFF: 100 POWDER RESPIRATORY (INHALATION) at 09:35

## 2024-12-13 RX ADMIN — MELATONIN TAB 3 MG 3 MG: 3 TAB at 21:08

## 2024-12-13 NOTE — NURSING NOTE
"Patient is less anxious and paranoid than yesterday. Reports improvement in symptoms. States \"I went to groups yesterday and it made me feel better.\" Going to groups today. Flirtatious w/ male peer. No Baptist delusions voiced. Appetite is excellent. Appears disheveled. Compliant w/ medications and mouth checks. Fall precautions in place. Continuous rounding maintained.   "

## 2024-12-13 NOTE — TREATMENT TEAM
12/12/24 2861   Pain Assessment Post Intervention   Pain Assessment Tool Used: 0-10   Post Intervention Pain Score 3   Post Intervention Pain Location/Orientation Location: Generalized   Post Intervention POSS S   Response to Interventions Effective     PRN Effective

## 2024-12-13 NOTE — TREATMENT TEAM
"Patient requested PRN Tylenol for generalized \"all over\" pain.      12/12/24 3981   Pain Assessment   Pain Assessment Tool 0-10   Pain Score 8   Pain Location/Orientation Location: Generalized   Pain Radiating Towards All over   Pain Onset/Description Onset: Ongoing   Effect of Pain on Daily Activities mood   Patient's Stated Pain Goal No pain   Hospital Pain Intervention(s) Medication (See MAR)       "

## 2024-12-13 NOTE — NURSING NOTE
"Patient complains of AH, would not elaborate on what was being said to her. Patient claims staff is ignoring her and her pain. She was informed this was not accurate. Patient claims she can't breath for \"5 minutes\" every morning and needs her inhaler. She endorses depression and anxiety. Denies VH at this time. She is visible and social in the milieu. No outward signs of distress. Safety checks ongoing.   "

## 2024-12-13 NOTE — PLAN OF CARE
Problem: Prexisting or High Potential for Compromised Skin Integrity  Goal: Skin integrity is maintained or improved  Description: INTERVENTIONS:  - Identify patients at risk for skin breakdown  - Assess and monitor skin integrity  - Assess and monitor nutrition and hydration status  - Monitor labs   - Assess for incontinence   - Turn and reposition patient  - Assist with mobility/ambulation  - Relieve pressure over bony prominences  - Avoid friction and shearing  - Provide appropriate hygiene as needed including keeping skin clean and dry  - Evaluate need for skin moisturizer/barrier cream  - Collaborate with interdisciplinary team   - Patient/family teaching  - Consider wound care consult   Outcome: Progressing     Problem: Alteration in Thoughts and Perception  Goal: Verbalize thoughts and feelings  Description: Interventions:  - Promote a nonjudgmental and trusting relationship with the patient through active listening and therapeutic communication  - Assess patient's level of functioning, behavior and potential for risk  - Engage patient in 1 on 1 interactions  - Encourage patient to express fears, feelings, frustrations, and discuss symptoms    - [place] patient to reality, help patient recognize reality-based thinking   - Administer medications as ordered and assess for potential side effects  - Provide the patient education related to the signs and symptoms of the illness and desired effects of prescribed medications  Outcome: Progressing  Goal: Refrain from acting on delusional thinking/internal stimuli  Description: Interventions:  - Monitor patient closely, per order   - Utilize least restrictive measures   - Set reasonable limits, give positive feedback for acceptable   - Administer medications as ordered and monitor of potential side effects  Outcome: Not Progressing  Goal: Agree to be compliant with medication regime, as prescribed and report medication side effects  Description: Interventions:  -  Offer appropriate PRN medication and supervise ingestion; conduct AIMS, as needed   Outcome: Progressing  Goal: Attend and participate in unit activities, including therapeutic, recreational, and educational groups  Description: Interventions:  -Encourage Visitation and family involvement in care  Outcome: Progressing  Goal: Recognize dysfunctional thoughts, communicate reality-based thoughts at the time of discharge  Description: Interventions:  - Provide medication and psycho-education to assist patient in compliance and developing insight into his/her illness   Outcome: Not Progressing  Goal: Complete daily ADLs, including personal hygiene independently, as able  Description: Interventions:  - Observe, teach, and assist patient with ADLS  - Monitor and promote a balance of rest/activity, with adequate nutrition and elimination   Outcome: Progressing     Problem: Ineffective Coping  Goal: Participates in unit activities  Description: Interventions:  - Provide therapeutic environment   - Provide required programming   - Redirect inappropriate behaviors   Outcome: Progressing  Goal: Understands least restrictive measures  Description: Interventions:  - Utilize least restrictive behavior  Outcome: Progressing  Goal: Free from restraint events  Description: - Utilize least restrictive measures   - Provide behavioral interventions   - Redirect inappropriate behaviors   Outcome: Progressing

## 2024-12-13 NOTE — ASSESSMENT & PLAN NOTE
No medication changes today  Depakote to 500 mg qhs (12/12/24) for mood stabilization   VPA level 12/15  Most recent VPA level 54 on 10/12/24  CMP on 10/12/24 reviewed, WNL  Clozaril 900 mg nightly for schizoaffective disorder  Clozaril monitoring:   CRP, CBC/diff, CK QMon for monitoring  ANC 12/9/24- 3.50  Most recent ECG reviewed 9/9/24 - normal ECG, NSR  Clozaril increased to 900 mg nightly on 10/29/24 - Clozaril level 1076 on 12/5/24 - will continue to monitor the level  Risperdal discontinued on 10/28/24 for moderate affectivity  Ativan changed to Klonopin 0.5 mg daily/ 1 mg qhs    Continue melatonin 3 mg nightly for interrupted sleep  Continue prazosin 5 mg nightly for PTSD associated nightmares; doses to be adjusted as indicated   Continue trazodone 50 mg nightly for insomnia  Discharge disposition: D/C 1/6 with ICM to home

## 2024-12-13 NOTE — PROGRESS NOTES
12/13/24 0900 12/13/24 1000 12/13/24 1100   Activity/Group Checklist   Group Exercise Community meeting Life Skills  (emotional balance topic)   Attendance Did not attend Did not attend Attended   Attendance Duration (min)  --   --  31-45   Interactions  --   --  Interacted appropriately  (Pt. spontaneously engaged in discussion on a positive of the day which she feels is her ability to attend groups and plans to attend them all today.Notable improvement in pt. volume of expression.)   Affect/Mood  --   --  Appropriate;Bright;Normal range   Goals Achieved  --   --  Increased hopefulness;Identified feelings;Discussed coping strategies      12/13/24 1330   Activity/Group Checklist   Group Other (Comment)  (Reflection and music)   Attendance Attended   Attendance Duration (min) 46-60   Interactions Interacted appropriately   Affect/Mood Calm;Appropriate   Goals Achieved Identified feelings;Identified triggers;Identified relapse prevention strategies;Able to listen to others;Able to engage in interactions;Able to reflect/comment on own behavior;Able to manage/cope with feelings;Verbalized increased hopefulness;Able to self-disclose;Able to recieve feedback;Discussed coping strategies;Discussed self-esteem issues

## 2024-12-13 NOTE — NURSING NOTE
"Patient reported increased anxiety and somatic complaints. PRN atarax 25mg and Voltaren gel administered at 1416. Patient currently watching TV in dayroom stating \"I'm going to stay out of my room and see if it helps.\" Results pending.   "

## 2024-12-13 NOTE — PROGRESS NOTES
Progress Note - Behavioral Health   Name: Meli Nowak 59 y.o. female I MRN: [id number]  Unit/Bed#: [facility] 651-02 I Date of Admission: 7/23/2024   Date of Service: 12/13/2024 I Hospital Day: 143     Assessment & Plan  Schizoaffective disorder, bipolar type (HCC)  No medication changes today  Depakote to 500 mg qhs (12/12/24) for mood stabilization   VPA level 12/15  Most recent VPA level 54 on 10/12/24  CMP on 10/12/24 reviewed, WNL  Clozaril 900 mg nightly for schizoaffective disorder  Clozaril monitoring:   CRP, CBC/diff, CK QMon for monitoring  ANC 12/9/24- 3.50  Most recent ECG reviewed 9/9/24 - normal ECG, NSR  Clozaril increased to 900 mg nightly on 10/29/24 - Clozaril level 1076 on 12/5/24 - will continue to monitor the level  Risperdal discontinued on 10/28/24 for moderate affectivity  Ativan changed to Klonopin 0.5 mg daily/ 1 mg qhs    Continue melatonin 3 mg nightly for interrupted sleep  Continue prazosin 5 mg nightly for PTSD associated nightmares; doses to be adjusted as indicated   Continue trazodone 50 mg nightly for insomnia  Discharge disposition: D/C 1/6 with ICM to home        ASHLIE (obstructive sleep apnea)  - f/u w/ Sleep Medicine in outpatient setting and consider CPAP as indicated          Plan   Progress Toward Goals:   Meli is progressing towards goals of inpatient psychiatric treatment by continued medication compliance and is attending therapeutic modalities on the milieu. However, the patient continues to require inpatient psychiatric hospitalization for continued medication management and titration to optimize symptom reduction, improve sleep hygiene, and demonstrate adequate self-care.    Recommended Treatment: Treatment plan and medication changes discussed and per the attending physician the plan is:    1.Continue with group therapy, milieu therapy and occupational therapy  2.Behavioral Health checks every 7 minutes  3.Continue frequent safety checks and vitals per unit  protocol  4.Continue with SLIM medical management as indicated  5.Continue with current medication regimen  6.Will review labs in the a.m.  7.Disposition Planning: Discharge planning and efforts remain ongoing    Behavioral Health Medications: all current active meds have been reviewed and continue current psychiatric medications.  Current Facility-Administered Medications   Medication Dose Route Frequency Provider Last Rate    acetaminophen  650 mg Oral Q4H PRN Marie Ziegler, CRNP      acetaminophen  650 mg Oral Q4H PRN Marie Ziegler, CRNP      acetaminophen  975 mg Oral Q6H PRN Marie Ziegler, CRNP      albuterol  2 puff Inhalation Q4H PRN Kristen Logan, CRNP      aluminum-magnesium hydroxide-simethicone  30 mL Oral Q4H PRN Parris Bolanos, CRNP      Artificial Tears  1 drop Both Eyes Q6H PRN Dereje Banuelos MD      atorvastatin  10 mg Oral Daily Marie Ziegler, CRNP      bisacodyl  10 mg Oral Daily PRN Kristen Logan, CRNP      bisacodyl  10 mg Rectal Daily PRN Marie Ziegler, CRNP      carvedilol  6.25 mg Oral BID With Meals Marie Ziegler, CRNP      clonazePAM  0.5 mg Oral Daily Marie Ziegler, CRNP      clonazePAM  1 mg Oral HS Marie Ziegler, CRNP      cloZAPine  900 mg Oral HS Marie Ziegler, CRNP      cyanocobalamin  1,000 mcg Oral Daily Marie Ziegler, CRNP      Diclofenac Sodium  2 g Topical 4x Daily PRN Marie Ziegler, CRNP      divalproex sodium  500 mg Oral HS Marie Ziegler, CRNP      famotidine  20 mg Oral BID Shan Fitzgerald DO      fluticasone  1 puff Inhalation Daily Marie Ziegler, CRNP      hydrOXYzine HCL  25 mg Oral Q6H PRN Max 4/day Marie Ziegler, CRNP      hydrOXYzine HCL  50 mg Oral Q6H PRN Max 4/day Marie Ziegler, CRNP      LORazepam  1 mg Intramuscular Q6H PRN Max 3/day Marie Ziegler, CRNP      LORazepam  1 mg Oral Q6H PRN Max 3/day Marie Ziegler, CRNP      melatonin  3 mg Oral HS Marie Ziegler, CRNP      mirtazapine  7.5 mg Oral HS PRN Marie  JOSE ELIAS Ziegler      Multivitamin  15 mL Oral Daily JOSE ELIAS Figueroa      OLANZapine  2.5 mg Oral Q6H PRN Dereje Banuelos MD      Or    OLANZapine  2.5 mg Intramuscular Q6H PRN Dereje Banuelos MD      OLANZapine  5 mg Oral Q6H PRN Dereje Banuelos MD      Or    OLANZapine  5 mg Intramuscular Q6H PRN Dereje Banuelos MD      ondansetron  4 mg Oral Q6H PRN Darin Lawton MD      polyethylene glycol  17 g Oral Daily JOSE ELIAS Ortega      polyethylene glycol  17 g Oral Daily PRN JOSE ELIAS Figueroa      prazosin  5 mg Oral HS JOSE ELIAS Figueroa      senna-docusate sodium  2 tablet Oral HS Rehana Borrego PA-C      traZODone  50 mg Oral HS JOSE ELIAS Figueroa         Risks / Benefits of Treatment:  Risks, benefits, and possible side effects of medications explained to patient and patient verbalizes understanding and agreement for treatment.       Subjective    Patient was seen today for continuation of care, records reviewed and patient was discussed with the morning case review team.    Meli was seen today for psychiatric follow-up.  On assessment today, Meli was seen in the hallway.  More visible today, patient reports that voices are better and that she is using distraction to help with symptoms.  Presybeterian and internal preoccupation less prominent. She is more social with peers and brighter on approach.  Depakote 500 mg nightly reintroduced into regimen on 12/12/2024 due to increased anxiety, we will obtain VPA level on 12/15/2024. Meli denies acute suicidal/self-harm ideation/intent/plan upon direct inquiry at this time.  Meli remains behaviorally appropriate, no agitation or aggression noted on exam or in report.  AH decreased with paranoid ideation. Impulse control is intact.  Meli remains adherent to her current psychotropic medication regimen and denies any side effects from medications, as well as none noted on exam.    Meli is stable and ready for discharge, however, even at baseline,  Meli continues with poor insight, judgement and coping skills that pose a risk to patient in a homeless shelter, or otherwise unsupervised setting. [place] is likely to decompensate rapidly, thus, becoming a risk of danger to self/others. Case management is assertively/actively working on securing the necessary level of care     Psychiatric Review of Systems:  Behavior over the last 24 hours:  unchanged.   Sleep: good  Appetite: good  Medication side effects: none  ROS: no complaints, denies shortness of breath or chest pain and all other systems are negative for acute changes        Objective    Vitals:  Vitals:    12/13/24 0924   BP: 113/70   Pulse: 85   Resp:    Temp:    SpO2: 97%       Laboratory Results:  I have personally reviewed all pertinent laboratory/tests results.  Most Recent Labs:   Lab Results   Component Value Date    WBC 7.75 12/09/2024    RBC 4.22 12/09/2024    HGB 13.4 12/09/2024    HCT 41.6 12/09/2024     12/09/2024    RDW 13.6 12/09/2024    NEUTROABS 3.50 12/09/2024    SODIUM 138 10/12/2024    K 4.1 10/12/2024     10/12/2024    CO2 29 10/12/2024    BUN 18 10/12/2024    CREATININE 0.97 10/12/2024    GLUC 146 (H) 10/12/2024    GLUF 98 08/10/2024    CALCIUM 9.2 10/12/2024    AST 12 (L) 10/12/2024    ALT 12 10/12/2024    ALKPHOS 84 10/12/2024    TP 6.7 10/12/2024    ALB 3.9 10/12/2024    TBILI 0.26 10/12/2024    VALPROICTOT 54 10/12/2024    AMMONIA 32 07/03/2024    [id number] 2.000 07/24/2024    HGBA1C 6.4 (H) 05/03/2024     05/03/2024       Mental Status Evaluation:  Appearance:  disheveled, marginal hygiene   Behavior:  cooperative, calm, guarded   Speech:  normal rate and volume   Mood:  less anxious, less depressed   Affect:  constricted, slightly brighter   Thought Process:  perseverative   Thought Content:  Holiness and somatic preoccupation, paranoid ideation   Perceptual Disturbances: auditory hallucinations still present, but less frequent   Risk Potential: Suicidal  ideation - None  Homicidal ideation - None  Potential for aggression - No   Memory:  recent and remote memory grossly intact   Sensorium  person, place, and time/date      Consciousness:  alert and awake   Attention: attention span and concentration are age appropriate   Insight:  limited   Judgment: limited   Gait/Station: normal gait/station   Motor Activity: no abnormal movements       Counseling / Coordination of Care:  Patient's progress reviewed with nursing staff.  Medications, treatment progress and treatment plan reviewed with patient.  Supportive counseling provided to the patient.    This note was completed in part utilizing Dragon dictation Software. Grammatical, translation, syntax errors, random word insertions, spelling mistakes, and incomplete sentences may be an occasional consequence of this system secondary to software limitations with voice recognition, ambient noise, and hardware issues. If you have any questions or concerns about the content, text, or information contained within the body of this dictation, please contact the provider for clarification

## 2024-12-13 NOTE — NURSING NOTE
Patient laying in bed. Reports PRN atarax 25mg was not effective. Encouraged to use coping skills and refrain from isolation.

## 2024-12-13 NOTE — TREATMENT TEAM
12/13/24 0800   Team Meeting   Meeting Type Daily Rounds   Team Members Present   Team Members Present Physician;Nurse;;;Occupational Therapist   Physician Team Member Dr. Banuelos / Dr. Hurst / Dr. Bland / JAIR Ziegler   Nursing Team Member David/Keith   Care Management Team Member Manju / Tee   Social Work Team Member Anastacio   OT Team Member Eduardo   Patient/Family Present   Patient Present No   Patient's Family Present No     Pt is reported to be on mouth checks. Pt requested depakote liquid due to having difficulty swallowing pills. Pt reported AH of lucifer. ICM visiting today.

## 2024-12-14 PROCEDURE — 99232 SBSQ HOSP IP/OBS MODERATE 35: CPT

## 2024-12-14 RX ORDER — VALPROIC ACID 250 MG/5ML
500 SOLUTION ORAL
Status: DISCONTINUED | OUTPATIENT
Start: 2024-12-14 | End: 2024-12-30 | Stop reason: HOSPADM

## 2024-12-14 RX ADMIN — PRAZOSIN HYDROCHLORIDE 5 MG: 5 CAPSULE ORAL at 21:14

## 2024-12-14 RX ADMIN — CLOZAPINE 900 MG: 200 TABLET ORAL at 21:14

## 2024-12-14 RX ADMIN — SENNOSIDES AND DOCUSATE SODIUM 2 TABLET: 8.6; 5 TABLET ORAL at 21:14

## 2024-12-14 RX ADMIN — FAMOTIDINE 20 MG: 20 TABLET ORAL at 17:00

## 2024-12-14 RX ADMIN — POLYETHYLENE GLYCOL 3350 17 G: 17 POWDER, FOR SOLUTION ORAL at 08:41

## 2024-12-14 RX ADMIN — ATORVASTATIN CALCIUM 10 MG: 10 TABLET, FILM COATED ORAL at 08:40

## 2024-12-14 RX ADMIN — Medication 15 ML: at 08:41

## 2024-12-14 RX ADMIN — CYANOCOBALAMIN TAB 1000 MCG 1000 MCG: 1000 TAB at 08:40

## 2024-12-14 RX ADMIN — FAMOTIDINE 20 MG: 20 TABLET ORAL at 08:40

## 2024-12-14 RX ADMIN — CLONAZEPAM 0.5 MG: 0.5 TABLET ORAL at 08:40

## 2024-12-14 RX ADMIN — FLUTICASONE FUROATE 1 PUFF: 100 POWDER RESPIRATORY (INHALATION) at 08:41

## 2024-12-14 RX ADMIN — CARVEDILOL 6.25 MG: 6.25 TABLET, FILM COATED ORAL at 16:42

## 2024-12-14 RX ADMIN — CLONAZEPAM 1 MG: 1 TABLET ORAL at 21:13

## 2024-12-14 RX ADMIN — VALPROIC ACID 500 MG: 500 SOLUTION ORAL at 21:14

## 2024-12-14 RX ADMIN — MELATONIN TAB 3 MG 3 MG: 3 TAB at 21:14

## 2024-12-14 RX ADMIN — TRAZODONE HYDROCHLORIDE 50 MG: 50 TABLET ORAL at 21:14

## 2024-12-14 RX ADMIN — ACETAMINOPHEN 975 MG: 325 TABLET, FILM COATED ORAL at 14:32

## 2024-12-14 RX ADMIN — ACETAMINOPHEN 975 MG: 325 TABLET, FILM COATED ORAL at 21:14

## 2024-12-14 NOTE — PROGRESS NOTES
Progress Note - Behavioral Health   Name: Meli Nowak 59 y.o. female I MRN: [id number]  Unit/Bed#: [facility] 651-02 I Date of Admission: 7/23/2024   Date of Service: 12/14/2024 I Hospital Day: 144     Assessment & Plan  Schizoaffective disorder, bipolar type (HCC)  No medication changes today  Depakote to 500 mg qhs (12/12/24) for mood stabilization   VPA level 12/15  Most recent VPA level 54 on 10/12/24  CMP on 10/12/24 reviewed, WNL  Clozaril 900 mg nightly for schizoaffective disorder  Clozaril monitoring:   CRP, CBC/diff, CK QMon for monitoring  ANC 12/9/24- 3.50  Most recent ECG reviewed 9/9/24 - normal ECG, NSR  Clozaril increased to 900 mg nightly on 10/29/24 - Clozaril level 1076 on 12/5/24 - will continue to monitor the level  Risperdal discontinued on 10/28/24 for moderate affectivity  Ativan changed to Klonopin 0.5 mg daily/ 1 mg qhs    Continue melatonin 3 mg nightly for interrupted sleep  Continue prazosin 5 mg nightly for PTSD associated nightmares; doses to be adjusted as indicated   Continue trazodone 50 mg nightly for insomnia  Discharge disposition: D/C 1/6 with ICM to home        ASHLIE (obstructive sleep apnea)  - f/u w/ Sleep Medicine in outpatient setting and consider CPAP as indicated          Plan   Progress Toward Goals:   Meli is progressing towards goals of inpatient psychiatric treatment by continued medication compliance and is attending therapeutic modalities on the milieu. However, the patient continues to require inpatient psychiatric hospitalization for continued medication management and titration to optimize symptom reduction, improve sleep hygiene, and demonstrate adequate self-care.    Recommended Treatment: Treatment plan and medication changes discussed and per the attending physician the plan is:    1.Continue with group therapy, milieu therapy and occupational therapy  2.Behavioral Health checks every 7 minutes  3.Continue frequent safety checks and vitals per unit  protocol  4.Continue with SLIM medical management as indicated  5.Continue with current medication regimen  6.Will review labs in the a.m.  7.Disposition Planning: Discharge planning and efforts remain ongoing    Behavioral Health Medications: all current active meds have been reviewed and continue current psychiatric medications.  Current Facility-Administered Medications   Medication Dose Route Frequency Provider Last Rate    acetaminophen  650 mg Oral Q4H PRN Marie Ziegler, CRNP      acetaminophen  650 mg Oral Q4H PRN Marie Ziegler, CRNP      acetaminophen  975 mg Oral Q6H PRN Marie Ziegler, CRNP      albuterol  2 puff Inhalation Q4H PRN Kristen Logan, CRNP      aluminum-magnesium hydroxide-simethicone  30 mL Oral Q4H PRN Parris Bolanos, CRNP      Artificial Tears  1 drop Both Eyes Q6H PRN Dereje Banuelos MD      atorvastatin  10 mg Oral Daily Marie Ziegler, CRNP      bisacodyl  10 mg Oral Daily PRN Kristen Logan, CRNP      bisacodyl  10 mg Rectal Daily PRN Marie Ziegler, CRNP      carvedilol  6.25 mg Oral BID With Meals Marie Ziegler, CRNP      clonazePAM  0.5 mg Oral Daily Marie Ziegler, CRNP      clonazePAM  1 mg Oral HS Marie Ziegler, CRNP      cloZAPine  900 mg Oral HS Marie Ziegler, CRNP      cyanocobalamin  1,000 mcg Oral Daily Marie Ziegler, CRNP      Diclofenac Sodium  2 g Topical 4x Daily PRN Marie Ziegler, CRNP      divalproex sodium  500 mg Oral HS Marie Ziegler, CRNP      famotidine  20 mg Oral BID Shan Fitzgerald DO      fluticasone  1 puff Inhalation Daily Marie Ziegler, CRNP      hydrOXYzine HCL  25 mg Oral Q6H PRN Max 4/day Marie Ziegler, CRNP      hydrOXYzine HCL  50 mg Oral Q6H PRN Max 4/day Marie Ziegler, CRNP      LORazepam  1 mg Intramuscular Q6H PRN Max 3/day Marie Ziegler, CRNP      LORazepam  1 mg Oral Q6H PRN Max 3/day Marie Ziegler, CRNP      melatonin  3 mg Oral HS Marie Ziegler, CRNP      mirtazapine  7.5 mg Oral HS PRN Marie  JOSE ELIAS Ziegler      Multivitamin  15 mL Oral Daily JOSE ELIAS Figueroa      OLANZapine  2.5 mg Oral Q6H PRN Dereje Banuelos MD      Or    OLANZapine  2.5 mg Intramuscular Q6H PRN Dereje Banuelos MD      OLANZapine  5 mg Oral Q6H PRN Dereje Banuelos MD      Or    OLANZapine  5 mg Intramuscular Q6H PRN Dereje Banuelos MD      ondansetron  4 mg Oral Q6H PRN Darin Lawton MD      polyethylene glycol  17 g Oral Daily JOSE ELIAS Ortega      polyethylene glycol  17 g Oral Daily PRN JOSE ELIAS Figueroa      prazosin  5 mg Oral HS JOSE ELIAS Figueroa      senna-docusate sodium  2 tablet Oral HS Rehana Borrego PA-C      traZODone  50 mg Oral HS JOSE ELIAS Figueroa         Risks / Benefits of Treatment:  Risks, benefits, and possible side effects of medications explained to patient and patient verbalizes understanding and agreement for treatment.       Subjective    Patient was seen today for continuation of care, records reviewed and patient was discussed with the morning case review team.    Meli was seen today for psychiatric follow-up.  On assessment today, Meli was seen in her room.  Currently lying in bed, patient reports increased anxiety as well as generalized pain.  As needed analgesic given and effective.  Patient also reports that she is sad as her  did not visit her yesterday.  Encouragement provided.  Auditory hallucinations ongoing, patient reports that they are not as bothersome and that she is using distraction and coping skills to manage.  Sleep and appetite unchanged. Meli denies acute suicidal/self-harm ideation/intent/plan upon direct inquiry at this time.  Meli remains behaviorally appropriate, no agitation or aggression noted on exam or in report.   Impulse control is intact.  Meli remains adherent to her current psychotropic medication regimen and denies any side effects from medications, as well as none noted on exam.    Meli is stable and ready for discharge,  however, even at baseline, Meli continues with poor insight, judgement and coping skills that pose a risk to patient in a homeless shelter, or otherwise unsupervised setting. Meli is likely to decompensate rapidly, thus, becoming a risk of danger to self/others. Case management is assertively/actively working on securing the necessary level of care     Psychiatric Review of Systems:  Behavior over the last 24 hours:  unchanged.   Sleep: good  Appetite: good  Medication side effects:none   ROS: no complaints, denies shortness of breath or chest pain and all other systems are negative for acute changes        Objective    Vitals:  Vitals:    12/14/24 1532   BP: 120/55   Pulse: 84   Resp: 18   Temp: 98.2 °F (36.8 °C)   SpO2: 98%       Laboratory Results:  I have personally reviewed all pertinent laboratory/tests results.  Most Recent Labs:   Lab Results   Component Value Date    WBC 7.75 12/09/2024    RBC 4.22 12/09/2024    HGB 13.4 12/09/2024    HCT 41.6 12/09/2024     12/09/2024    RDW 13.6 12/09/2024    NEUTROABS 3.50 12/09/2024    SODIUM 138 10/12/2024    K 4.1 10/12/2024     10/12/2024    CO2 29 10/12/2024    BUN 18 10/12/2024    CREATININE 0.97 10/12/2024    GLUC 146 (H) 10/12/2024    GLUF 98 08/10/2024    CALCIUM 9.2 10/12/2024    AST 12 (L) 10/12/2024    ALT 12 10/12/2024    ALKPHOS 84 10/12/2024    TP 6.7 10/12/2024    ALB 3.9 10/12/2024    TBILI 0.26 10/12/2024    VALPROICTOT 54 10/12/2024    AMMONIA 32 07/03/2024    [id number] 2.000 07/24/2024    HGBA1C 6.4 (H) 05/03/2024     05/03/2024       Mental Status Evaluation:  Appearance:  dressed appropriately, adequate grooming   Behavior:  cooperative, calm   Speech:  normal rate and volume   Mood:  anxious   Affect:  constricted   Thought Process:  perseverative, concrete   Thought Content:  Sikhism and somatic preoccupation, paranoid ideation   Perceptual Disturbances: auditory hallucinations still present, but less frequent   Risk  Potential: Suicidal ideation - None  Homicidal ideation - None  Potential for aggression - No   Memory:  recent and remote memory grossly intact   Sensorium  person, place, and time/date      Consciousness:  alert and awake   Attention: attention span and concentration are age appropriate   Insight:  limited   Judgment: limited   Gait/Station: normal gait/station   Motor Activity: no abnormal movements       Counseling / Coordination of Care:  Patient's progress reviewed with nursing staff.  Medications, treatment progress and treatment plan reviewed with patient.  Supportive counseling provided to the patient.    This note was completed in part utilizing Dragon dictation Software. Grammatical, translation, syntax errors, random word insertions, spelling mistakes, and incomplete sentences may be an occasional consequence of this system secondary to software limitations with voice recognition, ambient noise, and hardware issues. If you have any questions or concerns about the content, text, or information contained within the body of this dictation, please contact the provider for clarification

## 2024-12-14 NOTE — NURSING NOTE
Patient was withdrawn to her room this evening. She was social with staff and appropriate. Patient is still requesting to have the liquid Depakote over the pill form, this writer informed her that the information would be passed on again. She is meal and medication compliant. No outward signs of distress. Appearence is disheveled. Endorses depression and AH without informing staff of specifics. Denies SI/HI. Safety checks ongoing.

## 2024-12-14 NOTE — TREATMENT TEAM
12/13/24 4609   Pain Assessment Post Intervention   Pain Assessment Tool Used: 0-10   Post Intervention Pain Score No Pain   Post Intervention Pain Location/Orientation Location: Generalized   Post Intervention POSS S   Response to Interventions Effective     PRN effective

## 2024-12-14 NOTE — NURSING NOTE
Patient was withdrawn to her room post breakfast sleeping. Endorses mild anxiety, denies all other psych s/s. No behaviors noted. No c/o pain. No needs expressed. Had 100% for breakfast. Took her AM medications but coreg was held due to parameter. Safety checks ongoing.

## 2024-12-14 NOTE — TREATMENT TEAM
12/13/24 6801   Pain Assessment   Pain Assessment Tool 0-10   Pain Score 8   Pain Location/Orientation Location: Generalized   Pain Radiating Towards NA   Pain Onset/Description Onset: Ongoing   Effect of Pain on Daily Activities Can't sleep   Patient's Stated Pain Goal No pain   Hospital Pain Intervention(s) Medication (See MAR)   Multiple Pain Sites No     Patient requested Tylenol, 975 mg was pulled, 325 mg fell on the floor and was wasted. 650 mg was given total.

## 2024-12-14 NOTE — TREATMENT TEAM
12/14/24 1300   Pain Assessment   Pain Assessment Tool 0-10   Pain Score 9   Pain Location/Orientation Location: Generalized   Pain Radiating Towards na   Pain Onset/Description Onset: Ongoing   Effect of Pain on Daily Activities sleep   Patient's Stated Pain Goal No pain   Hospital Pain Intervention(s) Medication (See MAR)   Multiple Pain Sites No     PRN Tylenol 975 given for 9/10 generalized pain

## 2024-12-14 NOTE — PLAN OF CARE
Problem: Prexisting or High Potential for Compromised Skin Integrity  Goal: Skin integrity is maintained or improved  Description: INTERVENTIONS:  - Identify patients at risk for skin breakdown  - Assess and monitor skin integrity  - Assess and monitor nutrition and hydration status  - Monitor labs   - Assess for incontinence   - Turn and reposition patient  - Assist with mobility/ambulation  - Relieve pressure over bony prominences  - Avoid friction and shearing  - Provide appropriate hygiene as needed including keeping skin clean and dry  - Evaluate need for skin moisturizer/barrier cream  - Collaborate with interdisciplinary team   - Patient/family teaching  - Consider wound care consult   Outcome: Progressing     Problem: Alteration in Thoughts and Perception  Goal: Treatment Goal: Gain control of psychotic behaviors/thinking, reduce/eliminate presenting symptoms and demonstrate improved reality functioning upon discharge  Outcome: Not Progressing  Goal: Verbalize thoughts and feelings  Description: Interventions:  - Promote a nonjudgmental and trusting relationship with the patient through active listening and therapeutic communication  - Assess patient's level of functioning, behavior and potential for risk  - Engage patient in 1 on 1 interactions  - Encourage patient to express fears, feelings, frustrations, and discuss symptoms    - [place] patient to reality, help patient recognize reality-based thinking   - Administer medications as ordered and assess for potential side effects  - Provide the patient education related to the signs and symptoms of the illness and desired effects of prescribed medications  Outcome: Progressing  Goal: Refrain from acting on delusional thinking/internal stimuli  Description: Interventions:  - Monitor patient closely, per order   - Utilize least restrictive measures   - Set reasonable limits, give positive feedback for acceptable   - Administer medications as ordered and  monitor of potential side effects  Outcome: Progressing  Goal: Agree to be compliant with medication regime, as prescribed and report medication side effects  Description: Interventions:  - Offer appropriate PRN medication and supervise ingestion; conduct AIMS, as needed   Outcome: Progressing  Goal: Attend and participate in unit activities, including therapeutic, recreational, and educational groups  Description: Interventions:  -Encourage Visitation and family involvement in care  Outcome: Progressing  Goal: Recognize dysfunctional thoughts, communicate reality-based thoughts at the time of discharge  Description: Interventions:  - Provide medication and psycho-education to assist patient in compliance and developing insight into his/her illness   Outcome: Not Progressing  Goal: Complete daily ADLs, including personal hygiene independently, as able  Description: Interventions:  - Observe, teach, and assist patient with ADLS  - Monitor and promote a balance of rest/activity, with adequate nutrition and elimination   Outcome: Progressing     Problem: Ineffective Coping  Goal: Identifies ineffective coping skills  Outcome: Progressing  Goal: Demonstrates healthy coping skills  Outcome: Not Progressing  Goal: Participates in unit activities  Description: Interventions:  - Provide therapeutic environment   - Provide required programming   - Redirect inappropriate behaviors   Outcome: Progressing  Goal: Understands least restrictive measures  Description: Interventions:  - Utilize least restrictive behavior  Outcome: Progressing  Goal: Free from restraint events  Description: - Utilize least restrictive measures   - Provide behavioral interventions   - Redirect inappropriate behaviors   Outcome: Progressing     Problem: Depression  Goal: Verbalize thoughts and feelings  Description: Interventions:  - Assess and re-assess patient's level of risk   - Engage patient in 1:1 interactions, daily, for a minimum of 15 minutes    - Encourage patient to express feelings, fears, frustrations, hopes   Outcome: Progressing  Goal: Refrain from isolation  Description: Interventions:  - Develop a trusting relationship   - Encourage socialization   Outcome: Progressing  Goal: Refrain from self-neglect  Outcome: Progressing     Problem: Anxiety  Goal: Anxiety is at manageable level  Description: Interventions:  - Assess and monitor patient's anxiety level.   - Monitor for signs and symptoms (heart palpitations, chest pain, shortness of breath, headaches, nausea, feeling jumpy, restlessness, irritable, apprehensive).   - Collaborate with interdisciplinary team and initiate plan and interventions as ordered.  - [place] patient to unit/surroundings  - Explain treatment plan  - Encourage participation in care  - Encourage verbalization of concerns/fears  - Identify coping mechanisms  - Assist in developing anxiety-reducing skills  - Administer/offer alternative therapies  - Limit or eliminate stimulants  Outcome: Progressing

## 2024-12-15 LAB
ALBUMIN SERPL BCG-MCNC: 4 G/DL (ref 3.5–5)
ALP SERPL-CCNC: 83 U/L (ref 34–104)
ALT SERPL W P-5'-P-CCNC: 14 U/L (ref 7–52)
ANION GAP SERPL CALCULATED.3IONS-SCNC: 8 MMOL/L (ref 4–13)
AST SERPL W P-5'-P-CCNC: 15 U/L (ref 13–39)
BILIRUB SERPL-MCNC: 0.26 MG/DL (ref 0.2–1)
BUN SERPL-MCNC: 17 MG/DL (ref 5–25)
CALCIUM SERPL-MCNC: 9.1 MG/DL (ref 8.4–10.2)
CARDIAC TROPONIN I PNL SERPL HS: 4 NG/L (ref 8–18)
CHLORIDE SERPL-SCNC: 105 MMOL/L (ref 96–108)
CO2 SERPL-SCNC: 28 MMOL/L (ref 21–32)
CREAT SERPL-MCNC: 1.14 MG/DL (ref 0.6–1.3)
GFR SERPL CREATININE-BSD FRML MDRD: 53 ML/MIN/1.73SQ M
GLUCOSE SERPL-MCNC: 138 MG/DL (ref 65–140)
POTASSIUM SERPL-SCNC: 3.9 MMOL/L (ref 3.5–5.3)
PROT SERPL-MCNC: 6.6 G/DL (ref 6.4–8.4)
SODIUM SERPL-SCNC: 141 MMOL/L (ref 135–147)
VALPROATE SERPL-MCNC: 19 UG/ML (ref 50–100)

## 2024-12-15 PROCEDURE — 80164 ASSAY DIPROPYLACETIC ACD TOT: CPT | Performed by: STUDENT IN AN ORGANIZED HEALTH CARE EDUCATION/TRAINING PROGRAM

## 2024-12-15 PROCEDURE — 99232 SBSQ HOSP IP/OBS MODERATE 35: CPT

## 2024-12-15 PROCEDURE — 80053 COMPREHEN METABOLIC PANEL: CPT | Performed by: STUDENT IN AN ORGANIZED HEALTH CARE EDUCATION/TRAINING PROGRAM

## 2024-12-15 PROCEDURE — 93005 ELECTROCARDIOGRAM TRACING: CPT

## 2024-12-15 PROCEDURE — 84484 ASSAY OF TROPONIN QUANT: CPT

## 2024-12-15 RX ADMIN — POLYETHYLENE GLYCOL 3350 17 G: 17 POWDER, FOR SOLUTION ORAL at 08:53

## 2024-12-15 RX ADMIN — CLONAZEPAM 0.5 MG: 0.5 TABLET ORAL at 08:20

## 2024-12-15 RX ADMIN — TRAZODONE HYDROCHLORIDE 50 MG: 50 TABLET ORAL at 21:27

## 2024-12-15 RX ADMIN — Medication 15 ML: at 08:51

## 2024-12-15 RX ADMIN — CLOZAPINE 900 MG: 200 TABLET ORAL at 21:27

## 2024-12-15 RX ADMIN — MELATONIN TAB 3 MG 3 MG: 3 TAB at 21:27

## 2024-12-15 RX ADMIN — VALPROIC ACID 500 MG: 500 SOLUTION ORAL at 21:29

## 2024-12-15 RX ADMIN — ACETAMINOPHEN 975 MG: 325 TABLET, FILM COATED ORAL at 08:59

## 2024-12-15 RX ADMIN — FAMOTIDINE 20 MG: 20 TABLET ORAL at 17:16

## 2024-12-15 RX ADMIN — SENNOSIDES AND DOCUSATE SODIUM 2 TABLET: 8.6; 5 TABLET ORAL at 21:27

## 2024-12-15 RX ADMIN — ATORVASTATIN CALCIUM 10 MG: 10 TABLET, FILM COATED ORAL at 08:19

## 2024-12-15 RX ADMIN — FAMOTIDINE 20 MG: 20 TABLET ORAL at 08:19

## 2024-12-15 RX ADMIN — FLUTICASONE FUROATE 1 PUFF: 100 POWDER RESPIRATORY (INHALATION) at 08:21

## 2024-12-15 RX ADMIN — CYANOCOBALAMIN TAB 1000 MCG 1000 MCG: 1000 TAB at 08:20

## 2024-12-15 RX ADMIN — CLONAZEPAM 1 MG: 1 TABLET ORAL at 21:27

## 2024-12-15 NOTE — TREATMENT TEAM
12/14/24 2114   Pain Assessment   Pain Assessment Tool 0-10   Pain Score 10 - Worst Possible Pain   Pain Location/Orientation Location: Generalized   Patient's Stated Pain Goal No pain   Hospital Pain Intervention(s) Medication (See MAR)     Patient is c/o generalized pain and requested prn. Prn tylenol 975 mg was administered at 2114. Will reassess for effectiveness.

## 2024-12-15 NOTE — NURSING NOTE
Pt observed sleeping in room and does not appear to be in any pain or discomfort. PRN pain medication effective.

## 2024-12-15 NOTE — PLAN OF CARE
Problem: Alteration in Thoughts and Perception  Goal: Treatment Goal: Gain control of psychotic behaviors/thinking, reduce/eliminate presenting symptoms and demonstrate improved reality functioning upon discharge  Outcome: Progressing  Goal: Verbalize thoughts and feelings  Description: Interventions:  - Promote a nonjudgmental and trusting relationship with the patient through active listening and therapeutic communication  - Assess patient's level of functioning, behavior and potential for risk  - Engage patient in 1 on 1 interactions  - Encourage patient to express fears, feelings, frustrations, and discuss symptoms    - [place] patient to reality, help patient recognize reality-based thinking   - Administer medications as ordered and assess for potential side effects  - Provide the patient education related to the signs and symptoms of the illness and desired effects of prescribed medications  Outcome: Progressing  Goal: Refrain from acting on delusional thinking/internal stimuli  Description: Interventions:  - Monitor patient closely, per order   - Utilize least restrictive measures   - Set reasonable limits, give positive feedback for acceptable   - Administer medications as ordered and monitor of potential side effects  Outcome: Progressing  Goal: Agree to be compliant with medication regime, as prescribed and report medication side effects  Description: Interventions:  - Offer appropriate PRN medication and supervise ingestion; conduct AIMS, as needed   Outcome: Progressing  Goal: Recognize dysfunctional thoughts, communicate reality-based thoughts at the time of discharge  Description: Interventions:  - Provide medication and psycho-education to assist patient in compliance and developing insight into his/her illness   Outcome: Progressing  Goal: Complete daily ADLs, including personal hygiene independently, as able  Description: Interventions:  - Observe, teach, and assist patient with ADLS  - Monitor and  promote a balance of rest/activity, with adequate nutrition and elimination   Outcome: Progressing     Problem: Depression  Goal: Treatment Goal: Demonstrate behavioral control of depressive symptoms, verbalize feelings of improved mood/affect, and adopt new coping skills prior to discharge  Outcome: Progressing  Goal: Verbalize thoughts and feelings  Description: Interventions:  - Assess and re-assess patient's level of risk   - Engage patient in 1:1 interactions, daily, for a minimum of 15 minutes   - Encourage patient to express feelings, fears, frustrations, hopes   Outcome: Progressing  Goal: Refrain from isolation  Description: Interventions:  - Develop a trusting relationship   - Encourage socialization   Outcome: Progressing  Goal: Refrain from self-neglect  Outcome: Progressing     Problem: Anxiety  Goal: Anxiety is at manageable level  Description: Interventions:  - Assess and monitor patient's anxiety level.   - Monitor for signs and symptoms (heart palpitations, chest pain, shortness of breath, headaches, nausea, feeling jumpy, restlessness, irritable, apprehensive).   - Collaborate with interdisciplinary team and initiate plan and interventions as ordered.  - [place] patient to unit/surroundings  - Explain treatment plan  - Encourage participation in care  - Encourage verbalization of concerns/fears  - Identify coping mechanisms  - Assist in developing anxiety-reducing skills  - Administer/offer alternative therapies  - Limit or eliminate stimulants  Outcome: Progressing     Problem: Potential for Falls  Goal: Patient will remain free of falls  Description: INTERVENTIONS:  - Educate patient on patient safety including physical limitations  - Instruct patient to call for assistance with activity   - Consult OT/PT to assist with strengthening/mobility   - Keep Call bell within reach  - Keep bed low and locked with side rails adjusted as appropriate  - Keep care items and personal belongings within reach  -  Initiate and maintain comfort rounds  - Offer Toileting every 2 Hours, in advance of need  - Initiate/Maintain bed and chair alarm  - Obtain necessary fall risk management equipment: walker, wheelchair  - Apply yellow socks and bracelet for high fall risk patients  - Patient moved to room near nurses station  Outcome: Progressing

## 2024-12-15 NOTE — PROGRESS NOTES
Progress Note - Behavioral Health   Name: Meli Nowak 59 y.o. female I MRN: [id number]  Unit/Bed#: [facility] 651-02 I Date of Admission: 7/23/2024   Date of Service: 12/15/2024 I Hospital Day: 145     Assessment & Plan  Schizoaffective disorder, bipolar type (HCC)  No medication changes today  Depakote to 500 mg qhs (12/12/24) for mood stabilization   VPA level 12/15  Most recent VPA level 54 on 10/12/24  CMP on 10/12/24 reviewed, WNL  Clozaril 900 mg nightly for schizoaffective disorder  Clozaril monitoring:   CRP, CBC/diff, CK QMon for monitoring  ANC 12/9/24- 3.50  Most recent ECG reviewed 9/9/24 - normal ECG, NSR  Clozaril increased to 900 mg nightly on 10/29/24 - Clozaril level 1076 on 12/5/24 - will continue to monitor the level  Risperdal discontinued on 10/28/24 for moderate affectivity  Ativan changed to Klonopin 0.5 mg daily/ 1 mg qhs    Continue melatonin 3 mg nightly for interrupted sleep  Continue prazosin 5 mg nightly for PTSD associated nightmares; doses to be adjusted as indicated   Continue trazodone 50 mg nightly for insomnia  Discharge disposition: D/C 1/6 with ICM to home        ASHLIE (obstructive sleep apnea)  - f/u w/ Sleep Medicine in outpatient setting and consider CPAP as indicated          Plan   Progress Toward Goals:   Meli is progressing towards goals of inpatient psychiatric treatment by continued medication compliance and is attending therapeutic modalities on the milieu. However, the patient continues to require inpatient psychiatric hospitalization for continued medication management and titration to optimize symptom reduction, improve sleep hygiene, and demonstrate adequate self-care.    Recommended Treatment: Treatment plan and medication changes discussed and per the attending physician the plan is:    1.Continue with group therapy, milieu therapy and occupational therapy  2.Behavioral Health checks every 7 minutes  3.Continue frequent safety checks and vitals per unit  protocol  4.Continue with SLIM medical management as indicated  5.Continue with current medication regimen  6.Will review labs in the a.m.  7.Disposition Planning: Discharge planning and efforts remain ongoing    Behavioral Health Medications: all current active meds have been reviewed and continue current psychiatric medications.  Current Facility-Administered Medications   Medication Dose Route Frequency Provider Last Rate    acetaminophen  650 mg Oral Q4H PRN Marei Ziegler, CRNP      acetaminophen  650 mg Oral Q4H PRN Marie Ziegler, CRNP      acetaminophen  975 mg Oral Q6H PRN Marie Ziegler, CRNP      albuterol  2 puff Inhalation Q4H PRN Kristen Logan, CRNP      aluminum-magnesium hydroxide-simethicone  30 mL Oral Q4H PRN Parris Bolanos, CRNP      Artificial Tears  1 drop Both Eyes Q6H PRN Dereje Banuelos MD      atorvastatin  10 mg Oral Daily Marie Ziegler, CRNP      bisacodyl  10 mg Oral Daily PRN Kristen Logan, CRNP      bisacodyl  10 mg Rectal Daily PRN Marie Ziegler, CRNP      carvedilol  6.25 mg Oral BID With Meals Marie Ziegler, CRNP      clonazePAM  0.5 mg Oral Daily Marie Ziegler, CRNP      clonazePAM  1 mg Oral HS Marie Ziegler, CRNP      cloZAPine  900 mg Oral HS Marie Ziegler, CRNP      cyanocobalamin  1,000 mcg Oral Daily Marie Ziegler, CRNP      Diclofenac Sodium  2 g Topical 4x Daily PRN Marie Ziegler, CRNP      famotidine  20 mg Oral BID Shan Fitzgerald DO      fluticasone  1 puff Inhalation Daily Marie Ziegler, CRNP      hydrOXYzine HCL  25 mg Oral Q6H PRN Max 4/day Marie Ziegler, CRNP      hydrOXYzine HCL  50 mg Oral Q6H PRN Max 4/day Marie Ziegler, CRNP      LORazepam  1 mg Intramuscular Q6H PRN Max 3/day Marie Ziegler, CRNP      LORazepam  1 mg Oral Q6H PRN Max 3/day Marie Ziegler, CRNP      melatonin  3 mg Oral HS Marie Ziegler, CRNP      mirtazapine  7.5 mg Oral HS PRN Marie Ziegler, CRNP      Multivitamin  15 mL Oral Daily Marie  JOSE ELIAS Ziegler      OLANZapine  2.5 mg Oral Q6H PRN Dereje Banuelos MD      Or    OLANZapine  2.5 mg Intramuscular Q6H PRN Dereje Banuelos MD      OLANZapine  5 mg Oral Q6H PRN Dereje Banuelos MD      Or    OLANZapine  5 mg Intramuscular Q6H PRN Dereje Banuelos MD      ondansetron  4 mg Oral Q6H PRN Darin Lawton MD      polyethylene glycol  17 g Oral Daily JOSE ELIAS Ortega      polyethylene glycol  17 g Oral Daily PRN JOSE ELIAS Figueroa      prazosin  5 mg Oral HS JOSE ELIAS Figueroa      senna-docusate sodium  2 tablet Oral HS Rehana Borrego PA-C      traZODone  50 mg Oral HS JOSE ELIAS Figueroa      valproic acid  500 mg Oral HS JOSE ELIAS Figueroa         Risks / Benefits of Treatment:  Risks, benefits, and possible side effects of medications explained to patient and patient verbalizes understanding and agreement for treatment.       Subjective    Patient was seen today for continuation of care, records reviewed and patient was discussed with the morning case review team.    Meli was seen today for psychiatric follow-up.  On assessment today, Meli was calm and cooperative.  Overall generalized pain still verbalized, patient does state that she believes it is from the mattress.  Auditory hallucinations also endorsed, they are not as bothersome and she is using distraction and the radio to assist with symptoms.  Sleep and appetite well..     Meli denies acute suicidal/self-harm ideation/intent/plan upon direct inquiry at this time.  Meli remains behaviorally appropriate, no agitation or aggression noted on exam or in report.  Impulse control is intact.  Meli remains adherent to her current psychotropic medication regimen and denies any side effects from medications, as well as none noted on exam.    Psychiatric Review of Systems:  Behavior over the last 24 hours:  unchanged.   Sleep: good  Appetite: good  Medication side effects: none  ROS: no complaints, denies shortness of breath or  chest pain and all other systems are negative for acute changes        Objective    Vitals:  Vitals:    12/15/24 0737   BP: 104/52   Pulse: 78   Resp: 18   Temp: 97.8 °F (36.6 °C)   SpO2: 90%       Laboratory Results:  I have personally reviewed all pertinent laboratory/tests results.  Most Recent Labs:   Lab Results   Component Value Date    WBC 7.75 12/09/2024    RBC 4.22 12/09/2024    HGB 13.4 12/09/2024    HCT 41.6 12/09/2024     12/09/2024    RDW 13.6 12/09/2024    NEUTROABS 3.50 12/09/2024    SODIUM 138 10/12/2024    K 4.1 10/12/2024     10/12/2024    CO2 29 10/12/2024    BUN 18 10/12/2024    CREATININE 0.97 10/12/2024    GLUC 146 (H) 10/12/2024    GLUF 98 08/10/2024    CALCIUM 9.2 10/12/2024    AST 12 (L) 10/12/2024    ALT 12 10/12/2024    ALKPHOS 84 10/12/2024    TP 6.7 10/12/2024    ALB 3.9 10/12/2024    TBILI 0.26 10/12/2024    VALPROICTOT 54 10/12/2024    AMMONIA 32 07/03/2024    QBA8PBHXECNC 2.000 07/24/2024    HGBA1C 6.4 (H) 05/03/2024     05/03/2024       Mental Status Evaluation:  Appearance:  disheveled, marginal hygiene   Behavior:  cooperative, calm, guarded   Speech:  normal rate and volume   Mood:  less anxious, less depressed   Affect:  constricted   Thought Process:  perseverative   Thought Content:  Sabianism and somatic preoccupation, paranoid ideation   Perceptual Disturbances: none   Risk Potential: Suicidal ideation - None  Homicidal ideation - None  Potential for aggression - No   Memory:  recent and remote memory grossly intact   Sensorium  person, place, and time/date      Consciousness:  alert and awake   Attention: attention span and concentration are age appropriate   Insight:  limited   Judgment: limited   Gait/Station: normal gait/station   Motor Activity: no abnormal movements       Counseling / Coordination of Care:  Patient's progress reviewed with nursing staff.  Medications, treatment progress and treatment plan reviewed with patient.  Supportive counseling  provided to the patient.    This note was completed in part utilizing Dragon dictation Software. Grammatical, translation, syntax errors, random word insertions, spelling mistakes, and incomplete sentences may be an occasional consequence of this system secondary to software limitations with voice recognition, ambient noise, and hardware issues. If you have any questions or concerns about the content, text, or information contained within the body of this dictation, please contact the provider for clarification

## 2024-12-15 NOTE — TREATMENT TEAM
12/15/24 0859   Pain Assessment   Pain Assessment Tool 0-10   Pain Score 10 - Worst Possible Pain   Pain Location/Orientation Orientation: Bilateral;Location: Shoulder   Pain Onset/Description Onset: Ongoing   Patient's Stated Pain Goal No pain   Hospital Pain Intervention(s) Medication (See MAR)   Multiple Pain Sites No     Pt requested/received prn 975 Tylenol PO for 10/10 B/L shoulder pain.

## 2024-12-15 NOTE — NURSING NOTE
Pt awake and alert. Visible in dayroom and milieu. Selectively social with peers. Pt denies S/HI/AVH. Compliant with scheduled medications and meals. Reported 10/10 B/L shoulder pain and requested/received  Tylenol PO at 0859. Upon reassessment patient resting comfortably in room taking a nap appearing to be in no distress or discomfort. BP medication help this morning due to parameters not being met. BP of 104/52. Pt denies any unmet needs at this time. Resting comfortably in room.

## 2024-12-15 NOTE — NURSING NOTE
Patient is cooperative with care. Patient endorses depression and AH. Patient denies all other psych s/s. Medication compliant. Patient is withdrawn to room during shift. Patient encouraged to make needs known. Safety checks ongoing.

## 2024-12-16 LAB
BASOPHILS # BLD AUTO: 0.1 THOUSANDS/ΜL (ref 0–0.1)
BASOPHILS NFR BLD AUTO: 1 % (ref 0–1)
CK SERPL-CCNC: 31 U/L (ref 26–192)
CRP SERPL QL: 1.9 MG/L
EOSINOPHIL # BLD AUTO: 0 THOUSAND/ΜL (ref 0–0.61)
EOSINOPHIL NFR BLD AUTO: 0 % (ref 0–6)
ERYTHROCYTE [DISTWIDTH] IN BLOOD BY AUTOMATED COUNT: 13.4 % (ref 11.6–15.1)
HCT VFR BLD AUTO: 42.8 % (ref 34.8–46.1)
HGB BLD-MCNC: 13.7 G/DL (ref 11.5–15.4)
IMM GRANULOCYTES # BLD AUTO: 0.02 THOUSAND/UL (ref 0–0.2)
IMM GRANULOCYTES NFR BLD AUTO: 0 % (ref 0–2)
LYMPHOCYTES # BLD AUTO: 3.64 THOUSANDS/ΜL (ref 0.6–4.47)
LYMPHOCYTES NFR BLD AUTO: 48 % (ref 14–44)
MCH RBC QN AUTO: 31.7 PG (ref 26.8–34.3)
MCHC RBC AUTO-ENTMCNC: 32 G/DL (ref 31.4–37.4)
MCV RBC AUTO: 99 FL (ref 82–98)
MONOCYTES # BLD AUTO: 0.82 THOUSAND/ΜL (ref 0.17–1.22)
MONOCYTES NFR BLD AUTO: 11 % (ref 4–12)
NEUTROPHILS # BLD AUTO: 3.02 THOUSANDS/ΜL (ref 1.85–7.62)
NEUTS SEG NFR BLD AUTO: 40 % (ref 43–75)
NRBC BLD AUTO-RTO: 0 /100 WBCS
PLATELET # BLD AUTO: 230 THOUSANDS/UL (ref 149–390)
PMV BLD AUTO: 9.9 FL (ref 8.9–12.7)
RBC # BLD AUTO: 4.32 MILLION/UL (ref 3.81–5.12)
WBC # BLD AUTO: 7.6 THOUSAND/UL (ref 4.31–10.16)

## 2024-12-16 PROCEDURE — 99232 SBSQ HOSP IP/OBS MODERATE 35: CPT | Performed by: STUDENT IN AN ORGANIZED HEALTH CARE EDUCATION/TRAINING PROGRAM

## 2024-12-16 PROCEDURE — 85025 COMPLETE CBC W/AUTO DIFF WBC: CPT

## 2024-12-16 PROCEDURE — 82550 ASSAY OF CK (CPK): CPT

## 2024-12-16 PROCEDURE — 86140 C-REACTIVE PROTEIN: CPT

## 2024-12-16 RX ADMIN — FAMOTIDINE 20 MG: 20 TABLET ORAL at 08:08

## 2024-12-16 RX ADMIN — ACETAMINOPHEN 975 MG: 325 TABLET, FILM COATED ORAL at 10:14

## 2024-12-16 RX ADMIN — CYANOCOBALAMIN TAB 1000 MCG 1000 MCG: 1000 TAB at 08:06

## 2024-12-16 RX ADMIN — SENNOSIDES AND DOCUSATE SODIUM 2 TABLET: 8.6; 5 TABLET ORAL at 21:21

## 2024-12-16 RX ADMIN — CLONAZEPAM 0.5 MG: 0.5 TABLET ORAL at 08:06

## 2024-12-16 RX ADMIN — CARVEDILOL 6.25 MG: 6.25 TABLET, FILM COATED ORAL at 08:06

## 2024-12-16 RX ADMIN — Medication 15 ML: at 08:05

## 2024-12-16 RX ADMIN — MELATONIN TAB 3 MG 3 MG: 3 TAB at 21:21

## 2024-12-16 RX ADMIN — FAMOTIDINE 20 MG: 20 TABLET ORAL at 17:14

## 2024-12-16 RX ADMIN — VALPROIC ACID 500 MG: 500 SOLUTION ORAL at 21:21

## 2024-12-16 RX ADMIN — PRAZOSIN HYDROCHLORIDE 5 MG: 5 CAPSULE ORAL at 21:21

## 2024-12-16 RX ADMIN — CLONAZEPAM 1 MG: 1 TABLET ORAL at 21:21

## 2024-12-16 RX ADMIN — POLYETHYLENE GLYCOL 3350 17 G: 17 POWDER, FOR SOLUTION ORAL at 08:05

## 2024-12-16 RX ADMIN — CARVEDILOL 6.25 MG: 6.25 TABLET, FILM COATED ORAL at 15:42

## 2024-12-16 RX ADMIN — CLOZAPINE 900 MG: 200 TABLET ORAL at 21:21

## 2024-12-16 RX ADMIN — FLUTICASONE FUROATE 1 PUFF: 100 POWDER RESPIRATORY (INHALATION) at 08:08

## 2024-12-16 RX ADMIN — TRAZODONE HYDROCHLORIDE 50 MG: 50 TABLET ORAL at 21:21

## 2024-12-16 RX ADMIN — ATORVASTATIN CALCIUM 10 MG: 10 TABLET, FILM COATED ORAL at 08:06

## 2024-12-16 NOTE — PROGRESS NOTES
Progress Note - Behavioral Health   Name: Meli Nowak 59 y.o. female I MRN: [id number]  Unit/Bed#: [facility] 651-02 I Date of Admission: 7/23/2024   Date of Service: 12/16/2024 I Hospital Day: 146     Assessment & Plan  Schizoaffective disorder, bipolar type (HCC)  No medication changes today  Depakote to 500 mg qhs (12/12/24) for mood stabilization   VPA level 12/15-19  Most recent VPA level 54 on 10/12/24  CMP on 10/12/24 reviewed, WNL  Clozaril 900 mg nightly for schizoaffective disorder  Clozaril monitoring:   CRP, CBC/diff, CK QMon for monitoring  ANC 12/16/24- 3.02  Most recent ECG reviewed 9/9/24 - normal ECG, NSR  Clozaril increased to 900 mg nightly on 10/29/24 - Clozaril level 1076 on 12/5/24 - will continue to monitor the level  Risperdal discontinued on 10/28/24 for moderate affectivity  Ativan changed to Klonopin 0.5 mg daily/ 1 mg qhs    Continue melatonin 3 mg nightly for interrupted sleep  Continue prazosin 5 mg nightly for PTSD associated nightmares; doses to be adjusted as indicated   Continue trazodone 50 mg nightly for insomnia  Discharge disposition: D/C 1/6 with ICM to home        ASHLIE (obstructive sleep apnea)  - f/u w/ Sleep Medicine in outpatient setting and consider CPAP as indicated          Plan   Progress Toward Goals:   Meli is progressing towards goals of inpatient psychiatric treatment by continued medication compliance and is attending therapeutic modalities on the milieu. However, the patient continues to require inpatient psychiatric hospitalization for continued medication management and titration to optimize symptom reduction, improve sleep hygiene, and demonstrate adequate self-care.    Recommended Treatment: Treatment plan and medication changes discussed and per the attending physician the plan is:    1.Continue with group therapy, milieu therapy and occupational therapy  2.Behavioral Health checks every 7 minutes  3.Continue frequent safety checks and vitals per unit  protocol  4.Continue with SLIM medical management as indicated  5.Continue with current medication regimen  6.Will review labs in the a.m.  7.Disposition Planning: Discharge planning and efforts remain ongoing    Behavioral Health Medications: all current active meds have been reviewed and continue current psychiatric medications.  Current Facility-Administered Medications   Medication Dose Route Frequency Provider Last Rate    acetaminophen  650 mg Oral Q4H PRN Marie Ziegler, CRNP      acetaminophen  650 mg Oral Q4H PRN Marie Ziegler, CRNP      acetaminophen  975 mg Oral Q6H PRN Marie Ziegler, CRNP      albuterol  2 puff Inhalation Q4H PRN Kristen Logan, CRNP      aluminum-magnesium hydroxide-simethicone  30 mL Oral Q4H PRN Parris Bolanos, CRNP      Artificial Tears  1 drop Both Eyes Q6H PRN Dereje Banuelos MD      atorvastatin  10 mg Oral Daily Marie Ziegler, CRNP      bisacodyl  10 mg Oral Daily PRN Kristen Logan, CRNP      bisacodyl  10 mg Rectal Daily PRN Marie Ziegler, CRNP      carvedilol  6.25 mg Oral BID With Meals Marie Ziegler, CRNP      clonazePAM  0.5 mg Oral Daily Marie Ziegler, CRNP      clonazePAM  1 mg Oral HS Marie Ziegler, CRNP      cloZAPine  900 mg Oral HS Marie Ziegler, CRNP      cyanocobalamin  1,000 mcg Oral Daily Marie Ziegler, CRNP      Diclofenac Sodium  2 g Topical 4x Daily PRN Marie Ziegler, CRNP      famotidine  20 mg Oral BID Shan Fitzgerald DO      fluticasone  1 puff Inhalation Daily Marie Ziegler, CRNP      hydrOXYzine HCL  25 mg Oral Q6H PRN Max 4/day Marie Ziegler, CRNP      hydrOXYzine HCL  50 mg Oral Q6H PRN Max 4/day Marie Ziegler, CRNP      LORazepam  1 mg Intramuscular Q6H PRN Max 3/day Marie Ziegler, CRNP      LORazepam  1 mg Oral Q6H PRN Max 3/day Marie Ziegler, CRNP      melatonin  3 mg Oral HS Marie Ziegler, CRNP      mirtazapine  7.5 mg Oral HS PRN Marie Ziegler, CRNP      Multivitamin  15 mL Oral Daily Marie  JOSE ELIAS Ziegler      OLANZapine  2.5 mg Oral Q6H PRN Dereje Banuelos MD      Or    OLANZapine  2.5 mg Intramuscular Q6H PRN Dereje Banuelos MD      OLANZapine  5 mg Oral Q6H PRN Dereje Banuelos MD      Or    OLANZapine  5 mg Intramuscular Q6H PRN Dereje Banuelos MD      ondansetron  4 mg Oral Q6H PRN Darin Lawton MD      polyethylene glycol  17 g Oral Daily JOSE ELIAS Ortega      polyethylene glycol  17 g Oral Daily PRN JOSE ELIAS Figueroa      prazosin  5 mg Oral HS JOSE ELIAS Figueroa      senna-docusate sodium  2 tablet Oral HS Rehana Borrego PA-C      traZODone  50 mg Oral HS JOSE ELIAS Figueroa      valproic acid  500 mg Oral HS JOSE ELIAS Figueroa         Risks / Benefits of Treatment:  Risks, benefits, and possible side effects of medications explained to patient and patient verbalizes understanding and agreement for treatment.       Subjective    Patient was seen today for continuation of care, records reviewed and patient was discussed with the morning case review team.    Meli was seen today for psychiatric follow-up.  On assessment today, Meli was calm and cooperative.  Currently lying in bed, patient reports that she is getting better as she no longer feels as anxious.  Labs obtained overnight, ANC currently 3.02.  Depakote also reintroduced into regimen for mood stabilization with VPA level 19.  Auditory hallucinations ongoing, patient does report that they are positive in nature.Meli denies acute suicidal/self-harm ideation/intent/plan upon direct inquiry at this time.  Meli remains behaviorally appropriate, no agitation or aggression noted on exam or in report. Impulse control is intact.  Meli remains adherent to her current psychotropic medication regimen and denies any side effects from medications, as well as none noted on exam.    Meli is stable and ready for discharge, however, even at baseline, Meli continues with poor insight, judgement and coping skills that pose a  risk to patient in a homeless shelter, or otherwise unsupervised setting. Meli is likely to decompensate rapidly, thus, becoming a risk of danger to self/others. Case management is assertively/actively working on securing the necessary level of care     Psychiatric Review of Systems:  Behavior over the last 24 hours:  unchanged.   Sleep: good  Appetite: good  Medication side effects: none  ROS: no complaints, denies shortness of breath or chest pain and all other systems are negative for acute changes        Objective    Vitals:  Vitals:    12/16/24 0728   BP: 124/65   Pulse: 80   Resp: 18   Temp: 98.1 °F (36.7 °C)   SpO2: 97%       Laboratory Results:  I have personally reviewed all pertinent laboratory/tests results.  Most Recent Labs:   Lab Results   Component Value Date    WBC 7.60 12/16/2024    RBC 4.32 12/16/2024    HGB 13.7 12/16/2024    HCT 42.8 12/16/2024     12/16/2024    RDW 13.4 12/16/2024    NEUTROABS 3.02 12/16/2024    SODIUM 141 12/15/2024    K 3.9 12/15/2024     12/15/2024    CO2 28 12/15/2024    BUN 17 12/15/2024    CREATININE 1.14 12/15/2024    GLUC 138 12/15/2024    GLUF 98 08/10/2024    CALCIUM 9.1 12/15/2024    AST 15 12/15/2024    ALT 14 12/15/2024    ALKPHOS 83 12/15/2024    TP 6.6 12/15/2024    ALB 4.0 12/15/2024    TBILI 0.26 12/15/2024    VALPROICTOT 19 (L) 12/15/2024    AMMONIA 32 07/03/2024    [id number] 2.000 07/24/2024    HGBA1C 6.4 (H) 05/03/2024     05/03/2024       Mental Status Evaluation:  Appearance:  disheveled, marginal hygiene   Behavior:  cooperative, calm   Speech:  normal rate and volume   Mood:  less anxious, less depressed   Affect:  constricted   Thought Process:  perseverative   Thought Content:  Rastafari and somatic preoccupation, paranoid ideation   Perceptual Disturbances: auditory hallucinations still present, but less frequent   Risk Potential: Suicidal ideation - None  Homicidal ideation - None  Potential for aggression - No   Memory:  recent  and remote memory grossly intact   Sensorium  person, place, and time/date      Consciousness:  alert and awake   Attention: attention span and concentration are age appropriate   Insight:  limited   Judgment: limited   Gait/Station: normal gait/station   Motor Activity: no abnormal movements       Counseling / Coordination of Care:  Patient's progress reviewed with nursing staff.  Medications, treatment progress and treatment plan reviewed with patient.  Supportive counseling provided to the patient.    This note was completed in part utilizing Dragon dictation Software. Grammatical, translation, syntax errors, random word insertions, spelling mistakes, and incomplete sentences may be an occasional consequence of this system secondary to software limitations with voice recognition, ambient noise, and hardware issues. If you have any questions or concerns about the content, text, or information contained within the body of this dictation, please contact the provider for clarification

## 2024-12-16 NOTE — NURSING NOTE
Patient is medication and meal compliant. Patient complained of generalized pain and requested medication for same. PRN Tylenol given at 1014 with effectiveness noted and patient went back to sleep in her room with no further complaints of pain. Patient denies depression or anxiety.  Patient is currently awaiting placement at this time.

## 2024-12-16 NOTE — ASSESSMENT & PLAN NOTE
No medication changes today  Depakote to 500 mg qhs (12/12/24) for mood stabilization   VPA level 12/15-19  Most recent VPA level 54 on 10/12/24  CMP on 10/12/24 reviewed, WNL  Clozaril 900 mg nightly for schizoaffective disorder  Clozaril monitoring:   CRP, CBC/diff, CK QMon for monitoring  ANC 12/16/24- 3.02  Most recent ECG reviewed 9/9/24 - normal ECG, NSR  Clozaril increased to 900 mg nightly on 10/29/24 - Clozaril level 1076 on 12/5/24 - will continue to monitor the level  Risperdal discontinued on 10/28/24 for moderate affectivity  Ativan changed to Klonopin 0.5 mg daily/ 1 mg qhs    Continue melatonin 3 mg nightly for interrupted sleep  Continue prazosin 5 mg nightly for PTSD associated nightmares; doses to be adjusted as indicated   Continue trazodone 50 mg nightly for insomnia  Discharge disposition: D/C 1/6 with ICM to home

## 2024-12-16 NOTE — NURSING NOTE
Patient was withdrawn to her room but came out to the milieu for snack. Denies all psych s/s. No behaviors noted. No c/o pain. No needs expressed. Took her HS medications but minipress was held due to parameter. Safety checks ongoing.

## 2024-12-16 NOTE — PLAN OF CARE
Problem: Alteration in Thoughts and Perception  Goal: Verbalize thoughts and feelings  Description: Interventions:  - Promote a nonjudgmental and trusting relationship with the patient through active listening and therapeutic communication  - Assess patient's level of functioning, behavior and potential for risk  - Engage patient in 1 on 1 interactions  - Encourage patient to express fears, feelings, frustrations, and discuss symptoms    - [place] patient to reality, help patient recognize reality-based thinking   - Administer medications as ordered and assess for potential side effects  - Provide the patient education related to the signs and symptoms of the illness and desired effects of prescribed medications  Outcome: Progressing     Problem: Ineffective Coping  Goal: Participates in unit activities  Description: Interventions:  - Provide therapeutic environment   - Provide required programming   - Redirect inappropriate behaviors   Outcome: Progressing     Problem: Nutrition/Hydration-ADULT  Goal: Nutrient/Hydration intake appropriate for improving, restoring or maintaining nutritional needs  Description: Monitor and assess patient's nutrition/hydration status for malnutrition. Collaborate with interdisciplinary team and initiate plan and interventions as ordered.  Monitor patient's weight and dietary intake as ordered or per policy. Utilize nutrition screening tool and intervene as necessary. Determine patient's food preferences and provide high-protein, high-caloric foods as appropriate.     INTERVENTIONS:  - Monitor oral intake, urinary output, labs, and treatment plans  - Assess nutrition and hydration status and recommend course of action  - Evaluate amount of meals eaten  - Assist patient with eating if necessary   - Allow adequate time for meals  - Recommend/ encourage appropriate diets, oral nutritional supplements, and vitamin/mineral supplements  - Order, calculate, and assess calorie counts as  needed  - Recommend, monitor, and adjust tube feedings and TPN/PPN based on assessed needs  - Assess need for intravenous fluids  - Provide specific nutrition/hydration education as appropriate  - Include patient/family/caregiver in decisions related to nutrition  Outcome: Progressing

## 2024-12-17 PROCEDURE — 99232 SBSQ HOSP IP/OBS MODERATE 35: CPT | Performed by: STUDENT IN AN ORGANIZED HEALTH CARE EDUCATION/TRAINING PROGRAM

## 2024-12-17 RX ADMIN — Medication 15 ML: at 08:48

## 2024-12-17 RX ADMIN — VALPROIC ACID 500 MG: 500 SOLUTION ORAL at 21:23

## 2024-12-17 RX ADMIN — ACETAMINOPHEN 975 MG: 325 TABLET, FILM COATED ORAL at 12:15

## 2024-12-17 RX ADMIN — FAMOTIDINE 20 MG: 20 TABLET ORAL at 08:48

## 2024-12-17 RX ADMIN — CLOZAPINE 900 MG: 200 TABLET ORAL at 21:24

## 2024-12-17 RX ADMIN — CARVEDILOL 6.25 MG: 6.25 TABLET, FILM COATED ORAL at 16:28

## 2024-12-17 RX ADMIN — CARVEDILOL 6.25 MG: 6.25 TABLET, FILM COATED ORAL at 08:51

## 2024-12-17 RX ADMIN — MELATONIN TAB 3 MG 3 MG: 3 TAB at 21:24

## 2024-12-17 RX ADMIN — SENNOSIDES AND DOCUSATE SODIUM 2 TABLET: 8.6; 5 TABLET ORAL at 21:24

## 2024-12-17 RX ADMIN — ATORVASTATIN CALCIUM 10 MG: 10 TABLET, FILM COATED ORAL at 08:48

## 2024-12-17 RX ADMIN — CLONAZEPAM 1 MG: 1 TABLET ORAL at 21:24

## 2024-12-17 RX ADMIN — FAMOTIDINE 20 MG: 20 TABLET ORAL at 17:01

## 2024-12-17 RX ADMIN — POLYETHYLENE GLYCOL 3350 17 G: 17 POWDER, FOR SOLUTION ORAL at 08:48

## 2024-12-17 RX ADMIN — FLUTICASONE FUROATE 1 PUFF: 100 POWDER RESPIRATORY (INHALATION) at 08:52

## 2024-12-17 RX ADMIN — PRAZOSIN HYDROCHLORIDE 5 MG: 5 CAPSULE ORAL at 21:24

## 2024-12-17 RX ADMIN — TRAZODONE HYDROCHLORIDE 50 MG: 50 TABLET ORAL at 21:24

## 2024-12-17 RX ADMIN — CYANOCOBALAMIN TAB 1000 MCG 1000 MCG: 1000 TAB at 08:48

## 2024-12-17 RX ADMIN — CLONAZEPAM 0.5 MG: 0.5 TABLET ORAL at 08:48

## 2024-12-17 NOTE — PROGRESS NOTES
Progress Note - Behavioral Health     Meli ESPINOZA Nowak 59 y.o. female MRN: [id number]   Unit/Bed#: [facility] 651-02 Encounter: [id number]  Assessment & Plan  Schizoaffective disorder, bipolar type (HCC)  No medication changes today  Depakote to 500 mg qhs (12/12/24) for mood stabilization   VPA level 12/15-19  Most recent VPA level 54 on 10/12/24  CMP on 10/12/24 reviewed, WNL  Clozaril 900 mg nightly for schizoaffective disorder  Clozaril monitoring:   CRP, CBC/diff, CK QMon for monitoring  ANC 12/16/24- 3.02  Most recent ECG reviewed 9/9/24 - normal ECG, NSR  Clozaril increased to 900 mg nightly on 10/29/24 - Clozaril level 1076 on 12/5/24 - will continue to monitor the level  Risperdal discontinued on 10/28/24 for moderate affectivity  Ativan changed to Klonopin 0.5 mg daily/ 1 mg qhs    Continue melatonin 3 mg nightly for interrupted sleep  Continue prazosin 5 mg nightly for PTSD associated nightmares; doses to be adjusted as indicated   Continue trazodone 50 mg nightly for insomnia  Discharge disposition: D/C 1/6 with ICM to home    ASHLIE (obstructive sleep apnea)  - f/u w/ Sleep Medicine in outpatient setting and consider CPAP as indicated        Recommended Treatment:     Planned medication and treatment changes:    All current active medications have been reviewed  Encourage group therapy, milieu therapy and occupational therapy  Behavioral Health checks for safety monitoring      Current medications:  Current Facility-Administered Medications   Medication Dose Route Frequency Provider Last Rate    acetaminophen  650 mg Oral Q4H PRN JOSE ELIAS Figueroa      acetaminophen  650 mg Oral Q4H PRN JOSE ELIAS Figueroa      acetaminophen  975 mg Oral Q6H PRN JOSE ELIAS Figueroa      albuterol  2 puff Inhalation Q4H PRN JOSE ELIAS Ortega      aluminum-magnesium hydroxide-simethicone  30 mL Oral Q4H PRN JOSE ELIAS Puri      Artificial Tears  1 drop Both Eyes Q6H PRN Dereje Banuelos MD       atorvastatin  10 mg Oral Daily Marie Ziegler, CRNP      bisacodyl  10 mg Oral Daily PRN Kristen Logan, CRNP      bisacodyl  10 mg Rectal Daily PRN Marie Ziegler, CRNP      carvedilol  6.25 mg Oral BID With Meals Marie Ziegler, CRNP      clonazePAM  0.5 mg Oral Daily Marie Ziegler, CRNP      clonazePAM  1 mg Oral HS Marie Ziegler, CRNP      cloZAPine  900 mg Oral HS Marie Ziegler, CRNP      cyanocobalamin  1,000 mcg Oral Daily Marie Ziegler, CRNP      Diclofenac Sodium  2 g Topical 4x Daily PRN Marie Ziegler, CRNP      famotidine  20 mg Oral BID Shan Fitzgerald, DO      fluticasone  1 puff Inhalation Daily Marie Ziegler, CRNP      hydrOXYzine HCL  25 mg Oral Q6H PRN Max 4/day Marie Ziegler, CRNP      hydrOXYzine HCL  50 mg Oral Q6H PRN Max 4/day Marie Ziegler, CRNP      LORazepam  1 mg Intramuscular Q6H PRN Max 3/day Marie Ziegler, CRNP      LORazepam  1 mg Oral Q6H PRN Max 3/day Marie Ziegler, CRNP      melatonin  3 mg Oral HS Marie Ziegler, CRNP      mirtazapine  7.5 mg Oral HS PRN Marie Ziegler, CRNP      Multivitamin  15 mL Oral Daily Marie Ziegler, CRNP      OLANZapine  2.5 mg Oral Q6H PRN Dereje Banuelos MD      Or    OLANZapine  2.5 mg Intramuscular Q6H PRN Dereje Banuelos MD      OLANZapine  5 mg Oral Q6H PRN Dereje Banuelos MD      Or    OLANZapine  5 mg Intramuscular Q6H PRN Dereje Banuelos MD      ondansetron  4 mg Oral Q6H PRN Darin Lawton MD      polyethylene glycol  17 g Oral Daily Kristen Logan, CRNP      polyethylene glycol  17 g Oral Daily PRN Marie Ziegler, CRNP      prazosin  5 mg Oral HS Marie Ziegler, CRNP      senna-docusate sodium  2 tablet Oral HS Rehana Borrego, YENNY      traZODone  50 mg Oral HS Marie Ziegler, CRNP      valproic acid  500 mg Oral HS Marie Ziegler, CRNP         Risks / Benefits of Treatment:    Risks, benefits, and possible side effects of medications explained to patient and patient verbalizes understanding and  "agreement for treatment.    Subjective:    Behavior over the last 24 hours: unchanged.     Meli was seen today in follow-up for continuation of care. Meli is seen resting in bed covered in blankets, appears disheveled.  She states that she is looking forward to going back to her apartment with her cat.  She is superficially cooperative with interview offering premature responses to writer's questioning that she is having, \"no issues\", \"doing good\" and, \"I am okay.\"  Mentions she would like to be discharged sooner, however we continue to secure appropriate outpatient linkage.  More interested in speaking about items not psychiatric in nature, such as requesting a new bed for her room.  Meli adamantly denies suicidal/homicidal ideation in addition to thoughts of self-injury. Meli presently is contacting for safety on the unit and feels comfortable to confide in staff if thoughts arise.  Meli has been complaint with medications and tolerating without any side effects.        Sleep: normal  Appetite: normal  Medication side effects: No   ROS: no complaints    Mental Status Evaluation:    Appearance:  disheveled, in hospital attire, lying in bed covered in blankets   Behavior:  Mostly calm and pleasant, superficially cooperative   Speech:  normal rate, normal volume, normal pitch   Mood:  euthymic, \"I am okay\"   Affect:  constricted   Thought Process:  goal directed, perseverative, concrete   Associations: concrete associations   Thought Content:  no overt delusions   Perceptual Disturbances: no auditory hallucinations, no visual hallucinations, does not appear responding to internal stimuli   Risk Potential: Suicidal ideation - None at present  Homicidal ideation - None at present  Potential for aggression - No   Sensorium:  oriented to person, place, and time/date   Memory:  recent and remote memory grossly intact   Consciousness:  alert and awake   Attention/Concentration: attention span and concentration are age " appropriate   Insight:  limited   Judgment: limited   Gait/Station: in bed   Motor Activity: no abnormal movements     Vital signs in last 24 hours:    Temp:  [97.2 °F (36.2 °C)-97.8 °F (36.6 °C)] 97.2 °F (36.2 °C)  HR:  [74-78] 78  BP: (124-140)/(66-68) 135/66  Resp:  [16-18] 17  SpO2:  [96 %-99 %] 96 %  O2 Device: None (Room air)    Laboratory results: I have personally reviewed all pertinent laboratory/tests results    Results from the past 24 hours:   No results found for this or any previous visit (from the past 24 hours).      Suicide/Homicide Risk Assessment:    Risk of Harm to Self:   Nursing Suicide Risk Assessment Last 24 hours: C-SSRS Risk (Since Last Contact)  Calculated C-SSRS Risk Score (Since Last Contact): No Risk Indicated    Risk of Harm to Others:  Nursing Homicide Risk Assessment: Violence Risk to Others: Denies within past 6 months    The following interventions are recommended: Behavioral Health checks for safety monitoring, continued hospitalization on locked unit    Progress Toward Goals: progressing    Counseling / Coordination of Care:    Total floor / unit time spent today 38 minutes. Greater than 50% of total time was spent with the patient and / or family counseling and / or coordination of care. A description of counseling / coordination of care:    Rehana Borrego PA-C 12/17/24

## 2024-12-17 NOTE — PLAN OF CARE
Problem: Alteration in Thoughts and Perception  Goal: Treatment Goal: Gain control of psychotic behaviors/thinking, reduce/eliminate presenting symptoms and demonstrate improved reality functioning upon discharge  Outcome: Progressing  Goal: Verbalize thoughts and feelings  Description: Interventions:  - Promote a nonjudgmental and trusting relationship with the patient through active listening and therapeutic communication  - Assess patient's level of functioning, behavior and potential for risk  - Engage patient in 1 on 1 interactions  - Encourage patient to express fears, feelings, frustrations, and discuss symptoms    - Novelty patient to reality, help patient recognize reality-based thinking   - Administer medications as ordered and assess for potential side effects  - Provide the patient education related to the signs and symptoms of the illness and desired effects of prescribed medications  Outcome: Progressing  Goal: Refrain from acting on delusional thinking/internal stimuli  Description: Interventions:  - Monitor patient closely, per order   - Utilize least restrictive measures   - Set reasonable limits, give positive feedback for acceptable   - Administer medications as ordered and monitor of potential side effects  Outcome: Progressing  Goal: Agree to be compliant with medication regime, as prescribed and report medication side effects  Description: Interventions:  - Offer appropriate PRN medication and supervise ingestion; conduct AIMS, as needed   Outcome: Progressing  Goal: Attend and participate in unit activities, including therapeutic, recreational, and educational groups  Description: Interventions:  -Encourage Visitation and family involvement in care  Outcome: Progressing  Goal: Recognize dysfunctional thoughts, communicate reality-based thoughts at the time of discharge  Description: Interventions:  - Provide medication and psycho-education to assist patient in compliance and developing  insight into his/her illness   Outcome: Progressing  Goal: Complete daily ADLs, including personal hygiene independently, as able  Description: Interventions:  - Observe, teach, and assist patient with ADLS  - Monitor and promote a balance of rest/activity, with adequate nutrition and elimination   Outcome: Progressing     Problem: Risk for Self Injury/Neglect  Goal: Treatment Goal: Remain safe during length of stay, learn and adopt new coping skills, and be free of self-injurious ideation, impulses and acts at the time of discharge  Outcome: Progressing  Goal: Verbalize thoughts and feelings  Description: Interventions:  - Assess and re-assess patient's lethality and potential for self-injury  - Engage patient in 1:1 interactions, daily, for a minimum of 15 minutes  - Encourage patient to express feelings, fears, frustrations, hopes  - Establish rapport/trust with patient   Outcome: Progressing  Goal: Refrain from harming self  Description: Interventions:  - Monitor patient closely, per order  - Develop a trusting relationship  - Supervise medication ingestion, monitor effects and side effects   Outcome: Progressing  Goal: Recognize maladaptive responses and adopt new coping mechanisms  Outcome: Progressing     Problem: Depression  Goal: Treatment Goal: Demonstrate behavioral control of depressive symptoms, verbalize feelings of improved mood/affect, and adopt new coping skills prior to discharge  Outcome: Progressing  Goal: Verbalize thoughts and feelings  Description: Interventions:  - Assess and re-assess patient's level of risk   - Engage patient in 1:1 interactions, daily, for a minimum of 15 minutes   - Encourage patient to express feelings, fears, frustrations, hopes   Outcome: Progressing  Goal: Refrain from isolation  Description: Interventions:  - Develop a trusting relationship   - Encourage socialization   Outcome: Progressing  Goal: Refrain from self-neglect  Outcome: Progressing     Problem:  Anxiety  Goal: Anxiety is at manageable level  Description: Interventions:  - Assess and monitor patient's anxiety level.   - Monitor for signs and symptoms (heart palpitations, chest pain, shortness of breath, headaches, nausea, feeling jumpy, restlessness, irritable, apprehensive).   - Collaborate with interdisciplinary team and initiate plan and interventions as ordered.  - [place] patient to unit/surroundings  - Explain treatment plan  - Encourage participation in care  - Encourage verbalization of concerns/fears  - Identify coping mechanisms  - Assist in developing anxiety-reducing skills  - Administer/offer alternative therapies  - Limit or eliminate stimulants  Outcome: Progressing

## 2024-12-17 NOTE — NURSING NOTE
Pt is calm and cooperative with care. Pt is visible on the unit other wise isolative to self and room. Pt is medications and meals compliant. Pt  denies all psych s/s at this time. Will maintain q 15 mins checks.

## 2024-12-17 NOTE — TREATMENT TEAM
12/17/24 0800   Team Meeting   Meeting Type Daily Rounds   Team Members Present   Team Members Present Physician;Nurse;;;Occupational Therapist   Physician Team Member Dr. Banuelos / Dr. Hurst / Dr. Bland / JAIR Ziegler   Nursing Team Member David/Keith   Care Management Team Member Manju / Tee   Social Work Team Member Anastacio   OT Team Member dEuardo   Patient/Family Present   Patient Present No   Patient's Family Present No     Attending groups, showered yesterday. Pt visible on unit.

## 2024-12-17 NOTE — CASE MANAGEMENT
Pt seen by Cyndie from [facility]; completed intake [facility]. CM provided update to Cyndie. Cyndie reports program will coordinate visits with days when pt will not be attending [facility] psych rehab program. CM reviewed pt's need for Clozaril lab work weekly. CM to notify Cyndie of pt's projected d/c date once confirmed.     LAURIE spoke with Merari at [facility], tel# [phone]; reviewed pt's referral. CM to contact Merari once d/c date is confirmed to schedule pt's intake and tour at [facility].

## 2024-12-18 LAB
ATRIAL RATE: 77 BPM
P AXIS: 50 DEGREES
PR INTERVAL: 150 MS
QRS AXIS: 71 DEGREES
QRSD INTERVAL: 94 MS
QT INTERVAL: 412 MS
QTC INTERVAL: 467 MS
T WAVE AXIS: 83 DEGREES
VENTRICULAR RATE: 77 BPM

## 2024-12-18 PROCEDURE — 99232 SBSQ HOSP IP/OBS MODERATE 35: CPT | Performed by: STUDENT IN AN ORGANIZED HEALTH CARE EDUCATION/TRAINING PROGRAM

## 2024-12-18 PROCEDURE — 93010 ELECTROCARDIOGRAM REPORT: CPT | Performed by: STUDENT IN AN ORGANIZED HEALTH CARE EDUCATION/TRAINING PROGRAM

## 2024-12-18 RX ADMIN — FAMOTIDINE 20 MG: 20 TABLET ORAL at 17:01

## 2024-12-18 RX ADMIN — Medication 15 ML: at 08:10

## 2024-12-18 RX ADMIN — CARVEDILOL 6.25 MG: 6.25 TABLET, FILM COATED ORAL at 16:56

## 2024-12-18 RX ADMIN — ATORVASTATIN CALCIUM 10 MG: 10 TABLET, FILM COATED ORAL at 08:11

## 2024-12-18 RX ADMIN — TRAZODONE HYDROCHLORIDE 50 MG: 50 TABLET ORAL at 21:20

## 2024-12-18 RX ADMIN — PRAZOSIN HYDROCHLORIDE 5 MG: 5 CAPSULE ORAL at 21:20

## 2024-12-18 RX ADMIN — POLYETHYLENE GLYCOL 3350 17 G: 17 POWDER, FOR SOLUTION ORAL at 08:10

## 2024-12-18 RX ADMIN — CYANOCOBALAMIN TAB 1000 MCG 1000 MCG: 1000 TAB at 08:10

## 2024-12-18 RX ADMIN — SENNOSIDES AND DOCUSATE SODIUM 2 TABLET: 8.6; 5 TABLET ORAL at 21:19

## 2024-12-18 RX ADMIN — MELATONIN TAB 3 MG 3 MG: 3 TAB at 21:20

## 2024-12-18 RX ADMIN — CLONAZEPAM 1 MG: 1 TABLET ORAL at 21:20

## 2024-12-18 RX ADMIN — FLUTICASONE FUROATE 1 PUFF: 100 POWDER RESPIRATORY (INHALATION) at 08:11

## 2024-12-18 RX ADMIN — CLOZAPINE 900 MG: 200 TABLET ORAL at 21:19

## 2024-12-18 RX ADMIN — FAMOTIDINE 20 MG: 20 TABLET ORAL at 08:11

## 2024-12-18 RX ADMIN — VALPROIC ACID 500 MG: 500 SOLUTION ORAL at 21:19

## 2024-12-18 RX ADMIN — CLONAZEPAM 0.5 MG: 0.5 TABLET ORAL at 08:10

## 2024-12-18 RX ADMIN — CARVEDILOL 6.25 MG: 6.25 TABLET, FILM COATED ORAL at 08:10

## 2024-12-18 NOTE — ASSESSMENT & PLAN NOTE
No medication changes today  Depakote to 500 mg qhs (12/12/24) for mood stabilization   VPA level 12/15-19  Most recent VPA level 54 on 10/12/24  CMP on 10/12/24 reviewed, WNL  Clozaril 900 mg nightly for schizoaffective disorder  Clozaril monitoring:   CRP, CBC/diff, CK QMon for monitoring  ANC 12/16/24- 3.02  Most recent ECG reviewed 9/9/24 - normal ECG, NSR  Clozaril increased to 900 mg nightly on 10/29/24 - Clozaril level 1076 on 12/5/24 - will continue to monitor the level  Risperdal discontinued on 10/28/24 for moderate affectivity  Ativan changed to Klonopin 0.5 mg daily/ 1 mg qhs    Continue melatonin 3 mg nightly for interrupted sleep  Continue prazosin 5 mg nightly for PTSD associated nightmares; doses to be adjusted as indicated   Continue trazodone 50 mg nightly for insomnia  Discharge disposition: D/C 1/6 with ICM to home with follow up services

## 2024-12-18 NOTE — PROGRESS NOTES
10:11 AM: called back into OR for increased work of breathing. O2 sats 87-88%. Placed on CPAP +5/Fio2 28%> satting 97-96%.     10:25 AM: titrated to 25% Fio2 +5 mainting sats at 93-95%. Nasal flaring resolved but had some moderate subcostal retractions.     10:35 AM RT called, baby brought to nursery and placed on JOSE R CAM. 25% +5. Maintaining sats in appropriate range.     11:00 AM: RT noted increased Nasal flaring with earline at 93%, increased Fio2 to 28% with noted improvements in respiratory drive, mild to moderate subcostal retractions. Blood sugar 57. Mom pumping, plan for supplement with her milk or formula per request     11:10-11:15 AM: Fio2 decreased by RT to 26 %. Parents updated.   Plan for portable chest xray. Order placed.     11:50 AM: increased work of breathing, O2 88-90%, Fio2 increased to 30%. Attempt at syringe feeding colostrum but poor suck/intake     Chest xray results: IMPRESSION:  Diffuse bilateral granular opacities in keeping with surfactant  deficiency disorder.    Plan to call NICU, start line, labs (CBC with diff, CRP, Blood culture), antibiotics (Amp/Gent), and Fluids D10W.     12:30 PM: Spoke with NICU attending and Fellow. Patient accepted downtown. Proceed with orders as placed.     12:38 PM: Venous full panel results- Ph 7.23, PCO2 53, and BE -6.1.     1:00 PM: transfer consent signed. Patient satting at 88%, increase to +6 and fio2 of 30%    1:20 PM: satting at 83-85%, spoke with NICU recommended to run 10mL per Kg bolus and increase FiO2 to help with oxygen sat titration. Fio2 35% +6. Sats at 90%.     1:25 PM: Spoke with Dr. Sorenson, recommended increasing FiO2 to 40%. Gave permission to titrate FiO2 to keep O2 sats >90%. Bolus and Gent running.    1:40 PM: maintaining sats at 93-95% at 40% FiO2 +6.  Moderate retractions without nasal flaring.     2:10 PM: CCT at bedtime, report/updates given.     Ruth Cohen, APRN-CNP             Progress Note - Behavioral Health   Name: Meli Nowak 59 y.o. female I MRN: [id number]  Unit/Bed#: [facility] 651-02 I Date of Admission: 7/23/2024   Date of Service: 12/18/2024 I Hospital Day: 148     Assessment & Plan  Schizoaffective disorder, bipolar type (HCC)  No medication changes today  Depakote to 500 mg qhs (12/12/24) for mood stabilization   VPA level 12/15-19  Most recent VPA level 54 on 10/12/24  CMP on 10/12/24 reviewed, WNL  Clozaril 900 mg nightly for schizoaffective disorder  Clozaril monitoring:   CRP, CBC/diff, CK QMon for monitoring  ANC 12/16/24- 3.02  Most recent ECG reviewed 9/9/24 - normal ECG, NSR  Clozaril increased to 900 mg nightly on 10/29/24 - Clozaril level 1076 on 12/5/24 - will continue to monitor the level  Risperdal discontinued on 10/28/24 for moderate affectivity  Ativan changed to Klonopin 0.5 mg daily/ 1 mg qhs    Continue melatonin 3 mg nightly for interrupted sleep  Continue prazosin 5 mg nightly for PTSD associated nightmares; doses to be adjusted as indicated   Continue trazodone 50 mg nightly for insomnia  Discharge disposition: D/C 1/6 with ICM to home with follow up services    ASHLIE (obstructive sleep apnea)  - f/u w/ Sleep Medicine in outpatient setting and consider CPAP as indicated          Plan   Progress Toward Goals:   Meli is progressing towards goals of inpatient psychiatric treatment by continued medication compliance and is attending therapeutic modalities on the milieu. However, the patient continues to require inpatient psychiatric hospitalization for continued medication management and titration to optimize symptom reduction, improve sleep hygiene, and demonstrate adequate self-care.    Recommended Treatment: Treatment plan and medication changes discussed and per the attending physician the plan is:    1.Continue with group therapy, milieu therapy and occupational therapy  2.Behavioral Health checks every 7 minutes  3.Continue frequent safety checks and vitals  per unit protocol  4.Continue with SLIM medical management as indicated  5.Continue with current medication regimen  6.Will review labs in the a.m.  7.Disposition Planning: Discharge planning and efforts remain ongoing    Behavioral Health Medications: all current active meds have been reviewed and continue current psychiatric medications.  Current Facility-Administered Medications   Medication Dose Route Frequency Provider Last Rate    acetaminophen  650 mg Oral Q4H PRN Marie Ziegler, CRNP      acetaminophen  650 mg Oral Q4H PRN Marie Ziegler, CRNP      acetaminophen  975 mg Oral Q6H PRN Marie Ziegler, CRNP      albuterol  2 puff Inhalation Q4H PRN Kristen Logan, CRNP      aluminum-magnesium hydroxide-simethicone  30 mL Oral Q4H PRN Parris Bolanos, CRNP      Artificial Tears  1 drop Both Eyes Q6H PRN Dereje Banuelos MD      atorvastatin  10 mg Oral Daily Marie Ziegler, CRNP      bisacodyl  10 mg Oral Daily PRN Kristen Logan, CRNP      bisacodyl  10 mg Rectal Daily PRN Marie Ziegler, CRNP      carvedilol  6.25 mg Oral BID With Meals Marie Ziegler, CRNP      clonazePAM  0.5 mg Oral Daily Marie Ziegler, CRNP      clonazePAM  1 mg Oral HS Marie Ziegler, CRNP      cloZAPine  900 mg Oral HS Marie Ziegler, CRNP      cyanocobalamin  1,000 mcg Oral Daily Marie Ziegler, CRNP      Diclofenac Sodium  2 g Topical 4x Daily PRN Marie Ziegler, CRNP      famotidine  20 mg Oral BID Shan Fitzgerald DO      fluticasone  1 puff Inhalation Daily Marie Ziegler, CRNP      hydrOXYzine HCL  25 mg Oral Q6H PRN Max 4/day Marie Ziegler, CRNP      hydrOXYzine HCL  50 mg Oral Q6H PRN Max 4/day Marie Ziegler, CRNP      LORazepam  1 mg Intramuscular Q6H PRN Max 3/day Marie Ziegler, CRNP      LORazepam  1 mg Oral Q6H PRN Max 3/day Marie Ziegler, CRNP      melatonin  3 mg Oral HS Marie Ziegelr, CRNP      mirtazapine  7.5 mg Oral HS PRN Marie Ziegler, CRNP      Multivitamin  15 mL Oral Daily  JOSE ELIAS Figueroa      OLANZapine  2.5 mg Oral Q6H PRN Dereej Banuelos MD      Or    OLANZapine  2.5 mg Intramuscular Q6H PRN Dereje Banuelos MD      OLANZapine  5 mg Oral Q6H PRN Dereje Banuelos MD      Or    OLANZapine  5 mg Intramuscular Q6H PRN Dereje Banuelos MD      ondansetron  4 mg Oral Q6H PRN Darin Lawton MD      polyethylene glycol  17 g Oral Daily JOSE ELIAS Ortega      polyethylene glycol  17 g Oral Daily PRN JOSE ELIAS Figueroa      prazosin  5 mg Oral HS JOSE ELIAS Figueroa      senna-docusate sodium  2 tablet Oral HS Rehana Borrego PA-C      traZODone  50 mg Oral HS JOSE ELIAS Figueroa      valproic acid  500 mg Oral HS JOSE ELIAS Figueroa         Risks / Benefits of Treatment:  Risks, benefits, and possible side effects of medications explained to patient and patient verbalizes understanding and agreement for treatment.       Subjective    Patient was seen today for continuation of care, records reviewed and patient was discussed with the morning case review team.    Meli was seen today for psychiatric follow-up.  On assessment today, Meli was pleasant.  Smiling during interview, patient states that anxiety is greatly improved and she looks forward to discharge.  Auditory hallucinations fixed and less bothersome. Meli denies acute suicidal/self-harm ideation/intent/plan upon direct inquiry at this time.  Meli remains behaviorally appropriate, no agitation or aggression noted on exam or in report.  Meli also denies HI/AH/VH, and does not appear overtly manic.  No overt delusions or paranoia are verbalized. Impulse control is intact.  Meli remains adherent to her current psychotropic medication regimen and denies any side effects from medications, as well as none noted on exam.    Psychiatric Review of Systems:  Behavior over the last 24 hours:  unchanged.   Sleep: good  Appetite: good  Medication side effects: none  ROS: no complaints, denies shortness of breath or chest  pain and all other systems are negative for acute changes        Objective    Vitals:  Vitals:    12/18/24 0752   BP: 137/79   Pulse: 79   Resp: 18   Temp: 98.5 °F (36.9 °C)   SpO2: 93%       Laboratory Results:  I have personally reviewed all pertinent laboratory/tests results.  Most Recent Labs:   Lab Results   Component Value Date    WBC 7.60 12/16/2024    RBC 4.32 12/16/2024    HGB 13.7 12/16/2024    HCT 42.8 12/16/2024     12/16/2024    RDW 13.4 12/16/2024    NEUTROABS 3.02 12/16/2024    SODIUM 141 12/15/2024    K 3.9 12/15/2024     12/15/2024    CO2 28 12/15/2024    BUN 17 12/15/2024    CREATININE 1.14 12/15/2024    GLUC 138 12/15/2024    GLUF 98 08/10/2024    CALCIUM 9.1 12/15/2024    AST 15 12/15/2024    ALT 14 12/15/2024    ALKPHOS 83 12/15/2024    TP 6.6 12/15/2024    ALB 4.0 12/15/2024    TBILI 0.26 12/15/2024    VALPROICTOT 19 (L) 12/15/2024    AMMONIA 32 07/03/2024    [id number] 2.000 07/24/2024    HGBA1C 6.4 (H) 05/03/2024     05/03/2024       Mental Status Evaluation:  Appearance:  disheveled, marginal hygiene   Behavior:  cooperative, calm, bizarre   Speech:  normal rate and volume   Mood:  less anxious, less depressed   Affect:  slightly brighter   Thought Process:  perseverative, concrete   Thought Content:  [place] and somatic preoccupation, paranoid ideation   Perceptual Disturbances: auditory hallucinations still present, but less frequent   Risk Potential: Suicidal ideation - None  Homicidal ideation - None  Potential for aggression - No   Memory:  recent and remote memory grossly intact   Sensorium  person, place, and time/date      Consciousness:  alert and awake   Attention: attention span and concentration are age appropriate   Insight:  limited   Judgment: limited   Gait/Station: normal gait/station   Motor Activity: no abnormal movements       Counseling / Coordination of Care:  Patient's progress reviewed with nursing staff.  Medications, treatment progress and  treatment plan reviewed with patient.  Supportive counseling provided to the patient.    This note was completed in part utilizing Dragon dictation Software. Grammatical, translation, syntax errors, random word insertions, spelling mistakes, and incomplete sentences may be an occasional consequence of this system secondary to software limitations with voice recognition, ambient noise, and hardware issues. If you have any questions or concerns about the content, text, or information contained within the body of this dictation, please contact the provider for clarification

## 2024-12-18 NOTE — PLAN OF CARE
Problem: Prexisting or High Potential for Compromised Skin Integrity  Goal: Skin integrity is maintained or improved  Description: INTERVENTIONS:  - Identify patients at risk for skin breakdown  - Assess and monitor skin integrity  - Assess and monitor nutrition and hydration status  - Monitor labs   - Assess for incontinence   - Turn and reposition patient  - Assist with mobility/ambulation  - Relieve pressure over bony prominences  - Avoid friction and shearing  - Provide appropriate hygiene as needed including keeping skin clean and dry  - Evaluate need for skin moisturizer/barrier cream  - Collaborate with interdisciplinary team   - Patient/family teaching  - Consider wound care consult   Outcome: Progressing     Problem: Alteration in Thoughts and Perception  Goal: Treatment Goal: Gain control of psychotic behaviors/thinking, reduce/eliminate presenting symptoms and demonstrate improved reality functioning upon discharge  Outcome: Progressing     Problem: Ineffective Coping  Goal: Identifies ineffective coping skills  Outcome: Progressing  Goal: Free from restraint events  Description: - Utilize least restrictive measures   - Provide behavioral interventions   - Redirect inappropriate behaviors   Outcome: Progressing     Problem: Risk for Self Injury/Neglect  Goal: Treatment Goal: Remain safe during length of stay, learn and adopt new coping skills, and be free of self-injurious ideation, impulses and acts at the time of discharge  Outcome: Progressing  Goal: Refrain from harming self  Description: Interventions:  - Monitor patient closely, per order  - Develop a trusting relationship  - Supervise medication ingestion, monitor effects and side effects   Outcome: Progressing  Goal: Attend and participate in unit activities, including therapeutic, recreational, and educational groups  Description: Interventions:  - Provide therapeutic and educational activities daily, encourage attendance and participation, and  document same in the medical record  - Obtain collateral information, encourage visitation and family involvement in care   Outcome: Progressing     Problem: Depression  Goal: Treatment Goal: Demonstrate behavioral control of depressive symptoms, verbalize feelings of improved mood/affect, and adopt new coping skills prior to discharge  Outcome: Progressing  Goal: Refrain from self-neglect  Outcome: Progressing     Problem: Anxiety  Goal: Anxiety is at manageable level  Description: Interventions:  - Assess and monitor patient's anxiety level.   - Monitor for signs and symptoms (heart palpitations, chest pain, shortness of breath, headaches, nausea, feeling jumpy, restlessness, irritable, apprehensive).   - Collaborate with interdisciplinary team and initiate plan and interventions as ordered.  - [place] patient to unit/surroundings  - Explain treatment plan  - Encourage participation in care  - Encourage verbalization of concerns/fears  - Identify coping mechanisms  - Assist in developing anxiety-reducing skills  - Administer/offer alternative therapies  - Limit or eliminate stimulants  Outcome: Progressing     Problem: Potential for Falls  Goal: Patient will remain free of falls  Description: INTERVENTIONS:  - Educate patient on patient safety including physical limitations  - Instruct patient to call for assistance with activity   - Consult OT/PT to assist with strengthening/mobility   - Keep Call bell within reach  - Keep bed low and locked with side rails adjusted as appropriate  - Keep care items and personal belongings within reach  - Initiate and maintain comfort rounds  - Offer Toileting every 2 Hours, in advance of need  - Initiate/Maintain bed and chair alarm  - Obtain necessary fall risk management equipment: walker, wheelchair  - Apply yellow socks and bracelet for high fall risk patients  - Patient moved to room near nurses station  Outcome: Progressing     Problem: Nutrition/Hydration-ADULT  Goal:  Nutrient/Hydration intake appropriate for improving, restoring or maintaining nutritional needs  Description: Monitor and assess patient's nutrition/hydration status for malnutrition. Collaborate with interdisciplinary team and initiate plan and interventions as ordered.  Monitor patient's weight and dietary intake as ordered or per policy. Utilize nutrition screening tool and intervene as necessary. Determine patient's food preferences and provide high-protein, high-caloric foods as appropriate.     INTERVENTIONS:  - Monitor oral intake, urinary output, labs, and treatment plans  - Assess nutrition and hydration status and recommend course of action  - Evaluate amount of meals eaten  - Assist patient with eating if necessary   - Allow adequate time for meals  - Recommend/ encourage appropriate diets, oral nutritional supplements, and vitamin/mineral supplements  - Order, calculate, and assess calorie counts as needed  - Recommend, monitor, and adjust tube feedings and TPN/PPN based on assessed needs  - Assess need for intravenous fluids  - Provide specific nutrition/hydration education as appropriate  - Include patient/family/caregiver in decisions related to nutrition  Outcome: Progressing

## 2024-12-18 NOTE — TREATMENT TEAM
12/18/24 0800   Team Meeting   Meeting Type Daily Rounds   Team Members Present   Team Members Present Physician;Nurse;;;Occupational Therapist   Physician Team Member Dr. Banuelos / Dr. Hurst / Dr. Bland / JAIR Ziegler   Nursing Team Member David/Keith   Care Management Team Member Manju / Tee   Social Work Team Member Anastacio   OT Team Member Eduardo   Patient/Family Present   Patient Present No   Patient's Family Present No     Pt out for meals, withdrawn to room. Somatic at times. Pt medication compliant. Discharge pending community resources.

## 2024-12-18 NOTE — NURSING NOTE
Pt is calm and cooperative with care this shift . Pt is visible this shift. Pt is takes all medications as scheduled and has good intake at meals time. Pt reports some anxiety due to her discharged. Denies all other s/s. Will maintain q 15 mins checks.

## 2024-12-18 NOTE — NURSING NOTE
Meli is calm and cooperative but remains somatic at times. Meli remains with anxiety about discharge. Meli is medication compliant but slow in taking her medications. Meli isolates to her room and only interacts with staff. Will monitor and support during treatment.

## 2024-12-18 NOTE — CMS CERTIFICATION NOTE
Certification: Based upon physical, mental and social evaluations, I certify that inpatient psychiatric services are medically necessary for this patient for a duration of 21 midnights for the treatment of Schizoaffective disorder, bipolar type (HCC)  Available alternative community resources do not meet the patient's mental health care needs.  I further attest that an established written individualized plan of care has been implemented and is outlined in the patient's medical records.    
Certification: Based upon physical, mental and social evaluations, I certify that inpatient psychiatric services are medically necessary for this patient for a duration of 30 midnights for the treatment of Schizoaffective disorder, bipolar type (HCC)  Available alternative community resources do not meet the patient's mental health care needs.  I further attest that an established written individualized plan of care has been implemented and is outlined in the patient's medical records.    
Recertification: Based upon physical, mental and social evaluations, I certify that inpatient psychiatric services continue to be medically necessary for this patient for a duration of 21 midnights for the treatment of  Schizoaffective disorder, bipolar type (HCC) Available alternative community resources still do not meet the patient's mental health care needs. I further attest that an established written individualized plan of care has been updated and is outlined in the patient's medical records.     
Recertification: Based upon physical, mental and social evaluations, I certify that inpatient psychiatric services continue to be medically necessary for this patient for a duration of 21 midnights for the treatment of  Schizoaffective disorder, bipolar type (HCC) Available alternative community resources still do not meet the patient's mental health care needs. I further attest that an established written individualized plan of care has been updated and is outlined in the patient's medical records.     
Recertification: Based upon physical, mental and social evaluations, I certify that inpatient psychiatric services continue to be medically necessary for this patient for a duration of 30 midnights for the treatment of  Schizoaffective disorder, bipolar type (HCC) Available alternative community resources still do not meet the patient's mental health care needs. I further attest that an established written individualized plan of care has been updated and is outlined in the patient's medical records.     
Elida Glynn

## 2024-12-18 NOTE — PROGRESS NOTES
12/18/24 0900 12/18/24 1000 12/18/24 1330   Activity/Group Checklist   Group Exercise Community meeting Other (Comment)  (Communication: Active listening skills and understanding)   Attendance Attended Attended Attended;Other (Comment)  (left early)   Attendance Duration (min) 46-60 31-45  --    Interactions Interacted appropriately Interacted appropriately Did not interact   Affect/Mood Appropriate Bright;Appropriate  --    Goals Achieved Able to listen to others;Able to engage in interactions Identified feelings;Identified triggers;Identified relapse prevention strategies;Discussed self-esteem issues;Discussed coping strategies;Able to listen to others;Able to reflect/comment on own behavior;Able to engage in interactions;Able to manage/cope with feelings;Able to self-disclose;Verbalized increased hopefulness;Able to recieve feedback  --

## 2024-12-19 LAB — CLOZAPINE SERPL-MCNC: 1053 NG/ML (ref 350–900)

## 2024-12-19 PROCEDURE — 99232 SBSQ HOSP IP/OBS MODERATE 35: CPT | Performed by: STUDENT IN AN ORGANIZED HEALTH CARE EDUCATION/TRAINING PROGRAM

## 2024-12-19 PROCEDURE — 80159 DRUG ASSAY CLOZAPINE: CPT

## 2024-12-19 RX ADMIN — SENNOSIDES AND DOCUSATE SODIUM 2 TABLET: 8.6; 5 TABLET ORAL at 21:24

## 2024-12-19 RX ADMIN — PRAZOSIN HYDROCHLORIDE 5 MG: 5 CAPSULE ORAL at 21:23

## 2024-12-19 RX ADMIN — MELATONIN TAB 3 MG 3 MG: 3 TAB at 21:23

## 2024-12-19 RX ADMIN — TRAZODONE HYDROCHLORIDE 50 MG: 50 TABLET ORAL at 21:24

## 2024-12-19 RX ADMIN — FLUTICASONE FUROATE 1 PUFF: 100 POWDER RESPIRATORY (INHALATION) at 08:12

## 2024-12-19 RX ADMIN — CLONAZEPAM 1 MG: 1 TABLET ORAL at 21:23

## 2024-12-19 RX ADMIN — CLOZAPINE 900 MG: 200 TABLET ORAL at 21:22

## 2024-12-19 RX ADMIN — FAMOTIDINE 20 MG: 20 TABLET ORAL at 08:12

## 2024-12-19 RX ADMIN — Medication 15 ML: at 08:11

## 2024-12-19 RX ADMIN — FAMOTIDINE 20 MG: 20 TABLET ORAL at 17:06

## 2024-12-19 RX ADMIN — CYANOCOBALAMIN TAB 1000 MCG 1000 MCG: 1000 TAB at 08:12

## 2024-12-19 RX ADMIN — CARVEDILOL 6.25 MG: 6.25 TABLET, FILM COATED ORAL at 16:36

## 2024-12-19 RX ADMIN — ATORVASTATIN CALCIUM 10 MG: 10 TABLET, FILM COATED ORAL at 08:12

## 2024-12-19 RX ADMIN — POLYETHYLENE GLYCOL 3350 17 G: 17 POWDER, FOR SOLUTION ORAL at 08:11

## 2024-12-19 RX ADMIN — ACETAMINOPHEN 975 MG: 325 TABLET, FILM COATED ORAL at 20:31

## 2024-12-19 RX ADMIN — CLONAZEPAM 0.5 MG: 0.5 TABLET ORAL at 08:12

## 2024-12-19 RX ADMIN — VALPROIC ACID 500 MG: 500 SOLUTION ORAL at 21:22

## 2024-12-19 RX ADMIN — CARVEDILOL 6.25 MG: 6.25 TABLET, FILM COATED ORAL at 08:12

## 2024-12-19 NOTE — NURSING NOTE
Meli is isolative to her room. Meli denies all psych s/s. Meli is medication compliant. Will continue to monitor and support during treatment.

## 2024-12-19 NOTE — ASSESSMENT & PLAN NOTE
No medication changes today  Depakote to 500 mg qhs (12/12/24) for mood stabilization   VPA level 12/15-19  Most recent VPA level 54 on 10/12/24  CMP on 10/12/24 reviewed, WNL  Clozaril 900 mg nightly for schizoaffective disorder  Clozaril monitoring:   CRP, CBC/diff, CK QMon for monitoring  ANC 12/16/24- 3.02  Most recent ECG reviewed 9/9/24 - normal ECG, NSR  Clozaril increased to 900 mg nightly on 10/29/24 - Clozaril level 1076 on 12/5/24 - will continue to monitor the level  Clozaril level to be obtained this evening for continued management  Risperdal discontinued on 10/28/24 for moderate affectivity  Ativan changed to Klonopin 0.5 mg daily/ 1 mg qhs    Continue melatonin 3 mg nightly for interrupted sleep  Continue prazosin 5 mg nightly for PTSD associated nightmares; doses to be adjusted as indicated   Continue trazodone 50 mg nightly for insomnia  Discharge disposition: D/C 1/6 with ICM to home with follow up services

## 2024-12-19 NOTE — PROGRESS NOTES
Pt attends groups.  Pt pleasant and cooperative  Pt  displayed positive thinking pattens.   Pt indicated she likes to cook meals that remind her of her family.  Pt did acknowledge sometimes she does not have the energy although she knows how to cook.  Pt mentioned meals on wheels is helpful.      12/19/24 1000   Activity/Group Checklist   Group Community meeting   Attendance Attended   Attendance Duration (min) 31-45   Interactions Interacted appropriately   Affect/Mood Appropriate   Goals Achieved Identified feelings;Identified relapse prevention strategies;Identified triggers;Discussed coping strategies;Increased hopefulness;Able to engage in interactions;Able to listen to others;Able to reflect/comment on own behavior;Able to manage/cope with feelings;Verbalized increased hopefulness;Able to self-disclose;Able to recieve feedback

## 2024-12-19 NOTE — PROGRESS NOTES
Progress Note - Behavioral Health   Name: Meli Nowak 59 y.o. female I MRN: [id number]  Unit/Bed#: [facility] 651-02 I Date of Admission: 7/23/2024   Date of Service: 12/19/2024 I Hospital Day: 149     Assessment & Plan  Schizoaffective disorder, bipolar type (HCC)  No medication changes today  Depakote to 500 mg qhs (12/12/24) for mood stabilization   VPA level 12/15-19  Most recent VPA level 54 on 10/12/24  CMP on 10/12/24 reviewed, WNL  Clozaril 900 mg nightly for schizoaffective disorder  Clozaril monitoring:   CRP, CBC/diff, CK QMon for monitoring  ANC 12/16/24- 3.02  Most recent ECG reviewed 9/9/24 - normal ECG, NSR  Clozaril increased to 900 mg nightly on 10/29/24 - Clozaril level 1076 on 12/5/24 - will continue to monitor the level  Clozaril level to be obtained this evening for continued management  Risperdal discontinued on 10/28/24 for moderate affectivity  Ativan changed to Klonopin 0.5 mg daily/ 1 mg qhs    Continue melatonin 3 mg nightly for interrupted sleep  Continue prazosin 5 mg nightly for PTSD associated nightmares; doses to be adjusted as indicated   Continue trazodone 50 mg nightly for insomnia  Discharge disposition: D/C 1/6 with ICM to home with follow up services    ASHLIE (obstructive sleep apnea)  - f/u w/ Sleep Medicine in outpatient setting and consider CPAP as indicated          Plan   Progress Toward Goals:   Meli is progressing towards goals of inpatient psychiatric treatment by continued medication compliance and is attending therapeutic modalities on the milieu. However, the patient continues to require inpatient psychiatric hospitalization for continued medication management and titration to optimize symptom reduction, improve sleep hygiene, and demonstrate adequate self-care.    Recommended Treatment: Treatment plan and medication changes discussed and per the attending physician the plan is:    1.Continue with group therapy, milieu therapy and occupational therapy  2.Behavioral Health  checks every 7 minutes  3.Continue frequent safety checks and vitals per unit protocol  4.Continue with [facility] medical management as indicated  5.Continue with current medication regimen  6.Will review labs in the a.m.  7.Disposition Planning: Discharge planning and efforts remain ongoing    Behavioral Health Medications: all current active meds have been reviewed and continue current psychiatric medications.  Current Facility-Administered Medications   Medication Dose Route Frequency Provider Last Rate    acetaminophen  650 mg Oral Q4H PRN Marie Ziegler, CRNP      acetaminophen  650 mg Oral Q4H PRN Marie Ziegler, CRNP      acetaminophen  975 mg Oral Q6H PRN Marie Ziegler, CRNP      albuterol  2 puff Inhalation Q4H PRN Kristen Logan, JOSE ELIAS      aluminum-magnesium hydroxide-simethicone  30 mL Oral Q4H PRN Parris Bolanos, JOSE ELIAS      Artificial Tears  1 drop Both Eyes Q6H PRN Dereje Banuelos MD      atorvastatin  10 mg Oral Daily Marie Ziegler, CRNP      bisacodyl  10 mg Oral Daily PRN Kristen Logan, CRNP      bisacodyl  10 mg Rectal Daily PRN Marie Ziegler, CHRISTOPHERNP      carvedilol  6.25 mg Oral BID With Meals Marie Ziegler, CHRISTOPHERNP      clonazePAM  0.5 mg Oral Daily Marie Ziegler, CRNP      clonazePAM  1 mg Oral HS Marie Ziegler, CRNP      cloZAPine  900 mg Oral HS Marie Ziegler, CRNP      cyanocobalamin  1,000 mcg Oral Daily Marie Ziegler, CRNP      Diclofenac Sodium  2 g Topical 4x Daily PRN Marie Ziegler, CRNP      famotidine  20 mg Oral BID Shan Fitzgerald DO      fluticasone  1 puff Inhalation Daily Marie Ziegler, CRNP      hydrOXYzine HCL  25 mg Oral Q6H PRN Max 4/day Marie Ziegler, CRNP      hydrOXYzine HCL  50 mg Oral Q6H PRN Max 4/day Marie Ziegler, CRNP      LORazepam  1 mg Intramuscular Q6H PRN Max 3/day Marie Ziegler, CRNP      LORazepam  1 mg Oral Q6H PRN Max 3/day Marie Ziegler, CRNP      melatonin  3 mg Oral HS Marie Ziegler, CRNP      mirtazapine  7.5 mg  "Oral HS PRN Marie Ziegler, CRVALE      Multivitamin  15 mL Oral Daily Marie Ziegler, JOSE ELIAS      OLANZapine  2.5 mg Oral Q6H PRN Dereje Banuelos MD      Or    OLANZapine  2.5 mg Intramuscular Q6H PRN Dereje Banuelos MD      OLANZapine  5 mg Oral Q6H PRN Dereje Banuelos MD      Or    OLANZapine  5 mg Intramuscular Q6H PRN Dereje Banuelos MD      ondansetron  4 mg Oral Q6H PRN Darin Lawton MD      polyethylene glycol  17 g Oral Daily Kristenabdoulaye Tomasjimmy Logan, JOSE ELIAS      polyethylene glycol  17 g Oral Daily PRN Marie Ziegler, JOSE ELIAS      prazosin  5 mg Oral HS Marie Ziegler, JOSE ELIAS      senna-docusate sodium  2 tablet Oral HS Rehana Borrego PA-C      traZODone  50 mg Oral HS Marie Ziegler, JOSE ELIAS      valproic acid  500 mg Oral HS JOSE ELIAS Figueroa         Risks / Benefits of Treatment:  Risks, benefits, and possible side effects of medications explained to patient and patient verbalizes understanding and agreement for treatment.       Subjective    Patient was seen today for continuation of care, records reviewed and patient was discussed with the morning case review team.    Meli was seen today for psychiatric follow-up.  On assessment today, Meli was pleasant.  Currently lying in bed, patient states that she is \"okay \".  Visible and social on the unit, patient states that she is optimistic about discharge on January 6 with [facility].  Encouragement provided, patient also advised to attend groups to assist with coping skills.  Weekly labs to be obtained on Monday, we will obtain Clozaril level this evening for ongoing management. Meli denies acute suicidal/self-harm ideation/intent/plan upon direct inquiry at this time.  Meli remains behaviorally appropriate, no agitation or aggression noted on exam or in report. Impulse control is intact.  Meli remains adherent to her current psychotropic medication regimen and denies any side effects from medications, as well as none noted on exam.    Meli is stable and ready for " discharge, however, even at baseline, Meli continues with poor insight, judgement and coping skills that pose a risk to patient in a homeless shelter, or otherwise unsupervised setting. [place] is likely to decompensate rapidly, thus, becoming a risk of danger to self/others. Case management is assertively/actively working on securing the necessary level of care and resources.     Psychiatric Review of Systems:  Behavior over the last 24 hours:  unchanged.   Sleep: good  Appetite: good  Medication side effects: none  ROS: no complaints, denies shortness of breath or chest pain and all other systems are negative for acute changes        Objective    Vitals:  Vitals:    12/19/24 0756   BP: 112/55   Pulse: 80   Resp: 19   Temp: 98.5 °F (36.9 °C)   SpO2: 95%       Laboratory Results:  I have personally reviewed all pertinent laboratory/tests results.  Most Recent Labs:   Lab Results   Component Value Date    WBC 7.60 12/16/2024    RBC 4.32 12/16/2024    HGB 13.7 12/16/2024    HCT 42.8 12/16/2024     12/16/2024    RDW 13.4 12/16/2024    NEUTROABS 3.02 12/16/2024    SODIUM 141 12/15/2024    K 3.9 12/15/2024     12/15/2024    CO2 28 12/15/2024    BUN 17 12/15/2024    CREATININE 1.14 12/15/2024    GLUC 138 12/15/2024    GLUF 98 08/10/2024    CALCIUM 9.1 12/15/2024    AST 15 12/15/2024    ALT 14 12/15/2024    ALKPHOS 83 12/15/2024    TP 6.6 12/15/2024    ALB 4.0 12/15/2024    TBILI 0.26 12/15/2024    VALPROICTOT 19 (L) 12/15/2024    AMMONIA 32 07/03/2024    CPN4CWTFGLWG 2.000 07/24/2024    HGBA1C 6.4 (H) 05/03/2024     05/03/2024       Mental Status Evaluation:  Appearance:  disheveled, marginal hygiene   Behavior:  cooperative, calm   Speech:  normal rate and volume   Mood:  less anxious, less depressed   Affect:  constricted   Thought Process:  perseverative   Thought Content:  Hoahaoism and somatic preoccupation, paranoid ideation   Perceptual Disturbances: auditory hallucinations still present, but less  frequent   Risk Potential: Suicidal ideation - None  Homicidal ideation - None  Potential for aggression - No   Memory:  recent and remote memory grossly intact   Sensorium  person, place, and time/date      Consciousness:  alert and awake   Attention: attention span and concentration are age appropriate   Insight:  limited   Judgment: limited   Gait/Station: normal gait/station   Motor Activity: no abnormal movements       Counseling / Coordination of Care:  Patient's progress reviewed with nursing staff.  Medications, treatment progress and treatment plan reviewed with patient.  Supportive counseling provided to the patient.    This note was completed in part utilizing Dragon dictation Software. Grammatical, translation, syntax errors, random word insertions, spelling mistakes, and incomplete sentences may be an occasional consequence of this system secondary to software limitations with voice recognition, ambient noise, and hardware issues. If you have any questions or concerns about the content, text, or information contained within the body of this dictation, please contact the provider for clarification

## 2024-12-19 NOTE — PLAN OF CARE
Problem: Prexisting or High Potential for Compromised Skin Integrity  Goal: Skin integrity is maintained or improved  Description: INTERVENTIONS:  - Identify patients at risk for skin breakdown  - Assess and monitor skin integrity  - Assess and monitor nutrition and hydration status  - Monitor labs   - Assess for incontinence   - Turn and reposition patient  - Assist with mobility/ambulation  - Relieve pressure over bony prominences  - Avoid friction and shearing  - Provide appropriate hygiene as needed including keeping skin clean and dry  - Evaluate need for skin moisturizer/barrier cream  - Collaborate with interdisciplinary team   - Patient/family teaching  - Consider wound care consult   Outcome: Progressing     Problem: Alteration in Thoughts and Perception  Goal: Treatment Goal: Gain control of psychotic behaviors/thinking, reduce/eliminate presenting symptoms and demonstrate improved reality functioning upon discharge  Outcome: Progressing  Goal: Refrain from acting on delusional thinking/internal stimuli  Description: Interventions:  - Monitor patient closely, per order   - Utilize least restrictive measures   - Set reasonable limits, give positive feedback for acceptable   - Administer medications as ordered and monitor of potential side effects  Outcome: Progressing     Problem: Ineffective Coping  Goal: Understands least restrictive measures  Description: Interventions:  - Utilize least restrictive behavior  Outcome: Progressing  Goal: Free from restraint events  Description: - Utilize least restrictive measures   - Provide behavioral interventions   - Redirect inappropriate behaviors   Outcome: Progressing     Problem: Risk for Self Injury/Neglect  Goal: Verbalize thoughts and feelings  Description: Interventions:  - Assess and re-assess patient's lethality and potential for self-injury  - Engage patient in 1:1 interactions, daily, for a minimum of 15 minutes  - Encourage patient to express feelings,  fears, frustrations, hopes  - Establish rapport/trust with patient   Outcome: Progressing  Goal: Refrain from harming self  Description: Interventions:  - Monitor patient closely, per order  - Develop a trusting relationship  - Supervise medication ingestion, monitor effects and side effects   Outcome: Progressing     Problem: Anxiety  Goal: Anxiety is at manageable level  Description: Interventions:  - Assess and monitor patient's anxiety level.   - Monitor for signs and symptoms (heart palpitations, chest pain, shortness of breath, headaches, nausea, feeling jumpy, restlessness, irritable, apprehensive).   - Collaborate with interdisciplinary team and initiate plan and interventions as ordered.  - [place] patient to unit/surroundings  - Explain treatment plan  - Encourage participation in care  - Encourage verbalization of concerns/fears  - Identify coping mechanisms  - Assist in developing anxiety-reducing skills  - Administer/offer alternative therapies  - Limit or eliminate stimulants  Outcome: Progressing     Problem: Potential for Falls  Goal: Patient will remain free of falls  Description: INTERVENTIONS:  - Educate patient on patient safety including physical limitations  - Instruct patient to call for assistance with activity   - Consult OT/PT to assist with strengthening/mobility   - Keep Call bell within reach  - Keep bed low and locked with side rails adjusted as appropriate  - Keep care items and personal belongings within reach  - Initiate and maintain comfort rounds  - Offer Toileting every 2 Hours, in advance of need  - Initiate/Maintain bed and chair alarm  - Obtain necessary fall risk management equipment: walker, wheelchair  - Apply yellow socks and bracelet for high fall risk patients  - Patient moved to room near nurses station  Outcome: Progressing     Problem: Nutrition/Hydration-ADULT  Goal: Nutrient/Hydration intake appropriate for improving, restoring or maintaining nutritional  needs  Description: Monitor and assess patient's nutrition/hydration status for malnutrition. Collaborate with interdisciplinary team and initiate plan and interventions as ordered.  Monitor patient's weight and dietary intake as ordered or per policy. Utilize nutrition screening tool and intervene as necessary. Determine patient's food preferences and provide high-protein, high-caloric foods as appropriate.     INTERVENTIONS:  - Monitor oral intake, urinary output, labs, and treatment plans  - Assess nutrition and hydration status and recommend course of action  - Evaluate amount of meals eaten  - Assist patient with eating if necessary   - Allow adequate time for meals  - Recommend/ encourage appropriate diets, oral nutritional supplements, and vitamin/mineral supplements  - Order, calculate, and assess calorie counts as needed  - Recommend, monitor, and adjust tube feedings and TPN/PPN based on assessed needs  - Assess need for intravenous fluids  - Provide specific nutrition/hydration education as appropriate  - Include patient/family/caregiver in decisions related to nutrition  Outcome: Progressing

## 2024-12-19 NOTE — TREATMENT TEAM
12/19/24 0800   Team Meeting   Meeting Type Daily Rounds   Team Members Present   Team Members Present Physician;Nurse;;;Occupational Therapist   Physician Team Member Dr. Banuelos / Dr. Hurst / Dr. Bland / JAIR Ziegler   Nursing Team Member David/Keith   Care Management Team Member Manju / Tee   Social Work Team Member Anastacio   OT Team Member Eduardo   Patient/Family Present   Patient Present No   Patient's Family Present No     Pt reported to be attending groups, deny all signs and symptoms. Discharge pending placement.

## 2024-12-19 NOTE — NURSING NOTE
Pt is calm and cooperative with care. Pt is visible on the unit with limited peer interactions. Pt is medications and meals compliant . Pt denies all psych s/s at this time. Will maintain q 15 mins checks.

## 2024-12-19 NOTE — PROGRESS NOTES
12/19/24 0860   Activity/Group Checklist   Group Life Skills  (topic signs/symptoms)   Attendance Attended   Attendance Duration (min) 46-60   Interactions Other (Comment)   Affect/Mood Calm  (spontaneously engaged yet focused on the time of day and when snack would begin.)   Goals Achieved Able to listen to others;Identified feelings;Discussed coping strategies

## 2024-12-20 PROCEDURE — 99232 SBSQ HOSP IP/OBS MODERATE 35: CPT | Performed by: STUDENT IN AN ORGANIZED HEALTH CARE EDUCATION/TRAINING PROGRAM

## 2024-12-20 RX ADMIN — FLUTICASONE FUROATE 1 PUFF: 100 POWDER RESPIRATORY (INHALATION) at 08:10

## 2024-12-20 RX ADMIN — CYANOCOBALAMIN TAB 1000 MCG 1000 MCG: 1000 TAB at 08:08

## 2024-12-20 RX ADMIN — ACETAMINOPHEN 975 MG: 325 TABLET, FILM COATED ORAL at 19:05

## 2024-12-20 RX ADMIN — FAMOTIDINE 20 MG: 20 TABLET ORAL at 08:08

## 2024-12-20 RX ADMIN — FAMOTIDINE 20 MG: 20 TABLET ORAL at 17:11

## 2024-12-20 RX ADMIN — SENNOSIDES AND DOCUSATE SODIUM 2 TABLET: 8.6; 5 TABLET ORAL at 21:14

## 2024-12-20 RX ADMIN — CLONAZEPAM 0.5 MG: 0.5 TABLET ORAL at 08:08

## 2024-12-20 RX ADMIN — VALPROIC ACID 500 MG: 500 SOLUTION ORAL at 21:15

## 2024-12-20 RX ADMIN — Medication 15 ML: at 08:08

## 2024-12-20 RX ADMIN — CLOZAPINE 900 MG: 200 TABLET ORAL at 21:14

## 2024-12-20 RX ADMIN — POLYETHYLENE GLYCOL 3350 17 G: 17 POWDER, FOR SOLUTION ORAL at 08:08

## 2024-12-20 RX ADMIN — CARVEDILOL 6.25 MG: 6.25 TABLET, FILM COATED ORAL at 08:08

## 2024-12-20 RX ADMIN — PRAZOSIN HYDROCHLORIDE 5 MG: 5 CAPSULE ORAL at 21:15

## 2024-12-20 RX ADMIN — CLONAZEPAM 1 MG: 1 TABLET ORAL at 21:15

## 2024-12-20 RX ADMIN — MELATONIN TAB 3 MG 3 MG: 3 TAB at 21:14

## 2024-12-20 RX ADMIN — ATORVASTATIN CALCIUM 10 MG: 10 TABLET, FILM COATED ORAL at 08:08

## 2024-12-20 RX ADMIN — TRAZODONE HYDROCHLORIDE 50 MG: 50 TABLET ORAL at 21:14

## 2024-12-20 NOTE — TREATMENT TEAM
12/20/24 1330   Activity/Group Checklist   Group Other (Comment)  (Holiday reflection and music)   Attendance Attended   Attendance Duration (min) 46-60   Interactions Interacted appropriately   Affect/Mood Appropriate;Calm   Goals Achieved Identified feelings;Identified triggers;Discussed coping strategies;Discussed self-esteem issues

## 2024-12-20 NOTE — CASE MANAGEMENT
CM met with pt, reviewed interview with Cyndie at [facility]. Pt reports remembering Cyndie from working with her in the past. Pt reports agreement with [facility] program. CM reviewed need to secure visiting nurse agency for pt's weekly lab draws. Pt in agreement with [facility] referral, reports no preference for agency.

## 2024-12-20 NOTE — ASSESSMENT & PLAN NOTE
No medication changes today  Depakote to 500 mg qhs (12/12/24) for mood stabilization   VPA level 12/15-19  Most recent VPA level 54 on 10/12/24  CMP on 10/12/24 reviewed, WNL  Clozaril 900 mg nightly for schizoaffective disorder  Clozaril monitoring:   CRP, CBC/diff, CK QMon for monitoring  ANC 12/16/24- 3.02  Most recent ECG reviewed 9/9/24 - normal ECG, NSR  Clozaril increased to 900 mg nightly on 10/29/24 - Clozaril level 1076 on 12/5/24 - will continue to monitor the level  Clozaril level- 1053 (12/19/24)  Risperdal discontinued on 10/28/24 for moderate affectivity  Ativan changed to Klonopin 0.5 mg daily/ 1 mg qhs    Continue melatonin 3 mg nightly for interrupted sleep  Continue prazosin 5 mg nightly for PTSD associated nightmares; doses to be adjusted as indicated   Continue trazodone 50 mg nightly for insomnia  Discharge disposition: D/C 1/6 with ICM to home with follow up services

## 2024-12-20 NOTE — TREATMENT TEAM
12/20/24 1208   Team Meeting   Meeting Type Tx Team Meeting   Initial Conference Date 12/20/24   Team Members Present   Team Members Present Physician;Nurse;   Physician Team Member Dr Banuelos   Nursing Team Member Clarisse   Care Management Team Member Arlette   Patient/Family Present   Patient Present Yes   Patient's Family Present No     Updated treatment plan and goals reviewed with pt. Pt in agreement and signed.

## 2024-12-20 NOTE — PROGRESS NOTES
Progress Note - Behavioral Health   Name: Meli Nowak 59 y.o. female I MRN: [id number]  Unit/Bed#: [facility] 651-02 I Date of Admission: 7/23/2024   Date of Service: 12/20/2024 I Hospital Day: 150     Assessment & Plan  Schizoaffective disorder, bipolar type (HCC)  No medication changes today  Depakote to 500 mg qhs (12/12/24) for mood stabilization   VPA level 12/15-19  Most recent VPA level 54 on 10/12/24  CMP on 10/12/24 reviewed, WNL  Clozaril 900 mg nightly for schizoaffective disorder  Clozaril monitoring:   CRP, CBC/diff, CK QMon for monitoring  ANC 12/16/24- 3.02  Most recent ECG reviewed 9/9/24 - normal ECG, NSR  Clozaril increased to 900 mg nightly on 10/29/24 - Clozaril level 1076 on 12/5/24 - will continue to monitor the level  Clozaril level- 1053 (12/19/24)  Risperdal discontinued on 10/28/24 for moderate affectivity  Ativan changed to Klonopin 0.5 mg daily/ 1 mg qhs    Continue melatonin 3 mg nightly for interrupted sleep  Continue prazosin 5 mg nightly for PTSD associated nightmares; doses to be adjusted as indicated   Continue trazodone 50 mg nightly for insomnia  Discharge disposition: D/C 1/6 with ICM to home with follow up services    ASHLIE (obstructive sleep apnea)  - f/u w/ Sleep Medicine in outpatient setting and consider CPAP as indicated          Plan   Progress Toward Goals:   Meli is progressing towards goals of inpatient psychiatric treatment by continued medication compliance and is attending therapeutic modalities on the milieu. However, the patient continues to require inpatient psychiatric hospitalization for continued medication management and titration to optimize symptom reduction, improve sleep hygiene, and demonstrate adequate self-care.    Recommended Treatment: Treatment plan and medication changes discussed and per the attending physician the plan is:    1.Continue with group therapy, milieu therapy and occupational therapy  2.Behavioral Health checks every 7 minutes  3.Continue  frequent safety checks and vitals per unit protocol  4.Continue with SLIM medical management as indicated  5.Continue with current medication regimen  6.Will review labs in the a.m.  7.Disposition Planning: Discharge planning and efforts remain ongoing    Behavioral Health Medications: all current active meds have been reviewed and continue current psychiatric medications.  Current Facility-Administered Medications   Medication Dose Route Frequency Provider Last Rate    acetaminophen  650 mg Oral Q4H PRN Marie Ziegler, CRNP      acetaminophen  650 mg Oral Q4H PRN Marie Ziegler, CRNP      acetaminophen  975 mg Oral Q6H PRN Marie Ziegler, CRNP      albuterol  2 puff Inhalation Q4H PRN Kristen Logan, CHRISTOPHERNP      aluminum-magnesium hydroxide-simethicone  30 mL Oral Q4H PRN Parris Bolanos, JOSE ELIAS      Artificial Tears  1 drop Both Eyes Q6H PRN Dereje Banuelos MD      atorvastatin  10 mg Oral Daily Marie Ziegler, CRNP      bisacodyl  10 mg Oral Daily PRN Kristen Logan, CRNP      bisacodyl  10 mg Rectal Daily PRN Marie Ziegler, CRNP      carvedilol  6.25 mg Oral BID With Meals Marie Ziegler, CRVALE      clonazePAM  0.5 mg Oral Daily Marie Ziegler, CRNP      clonazePAM  1 mg Oral HS Marie Ziegler, CRNP      cloZAPine  900 mg Oral HS Marie Ziegler, CRNP      cyanocobalamin  1,000 mcg Oral Daily Marie Ziegler, CRNP      Diclofenac Sodium  2 g Topical 4x Daily PRN Marie Ziegler, CRNP      famotidine  20 mg Oral BID Shan Fitzgerald DO      fluticasone  1 puff Inhalation Daily Marie Ziegler, CRNP      hydrOXYzine HCL  25 mg Oral Q6H PRN Max 4/day Marie Ziegler, CRNP      hydrOXYzine HCL  50 mg Oral Q6H PRN Max 4/day Marie Ziegler, CRNP      LORazepam  1 mg Intramuscular Q6H PRN Max 3/day Marie Ziegler, CRNP      LORazepam  1 mg Oral Q6H PRN Max 3/day Marie Ziegler, CRNP      melatonin  3 mg Oral HS Marie Ziegler, CRNP      mirtazapine  7.5 mg Oral HS PRN Marie Ziegler, CRNP    "   Multivitamin  15 mL Oral Daily Marie Ziegler, JOSE ELIAS      OLANZapine  2.5 mg Oral Q6H PRN Dereje Banuelos MD      Or    OLANZapine  2.5 mg Intramuscular Q6H PRN Dereje Banuelos MD      OLANZapine  5 mg Oral Q6H PRN Dereje Banuelos MD      Or    OLANZapine  5 mg Intramuscular Q6H PRN Dereje Banuelos MD      ondansetron  4 mg Oral Q6H PRN Darin Lawton MD      polyethylene glycol  17 g Oral Daily JOSE ELIAS Ortega      polyethylene glycol  17 g Oral Daily PRN JOSE ELIAS Figueroa      prazosin  5 mg Oral HS JOSE ELIAS Figueroa      senna-docusate sodium  2 tablet Oral HS Rehana Borrego PA-C      traZODone  50 mg Oral HS JOSE ELIAS Figueroa      valproic acid  500 mg Oral HS JOSE ELIAS Figueroa         Risks / Benefits of Treatment:  Risks, benefits, and possible side effects of medications explained to patient and patient verbalizes understanding and agreement for treatment.       Subjective    Patient was seen today for continuation of care, records reviewed and patient was discussed with the morning case review team.    Meli was seen today for psychiatric follow-up.  On assessment today, Meli was pleasant and brighter on approach. Stating \" I feel more grounded\", patient reports improvement in mood and AH which are fixed and chronic. No current acute symptoms reported, Meli reports optimism about discharge early next month and that she wants to reach out to her  as soon as she can. Sleep and appetite well. Meli denies acute suicidal/self-harm ideation/intent/plan upon direct inquiry at this time.  Meli remains behaviorally appropriate, no agitation or aggression noted on exam or in report. Impulse control is intact.  Meli remains adherent to her current psychotropic medication regimen and denies any side effects from medications, as well as none noted on exam.    Meli is stable and ready for discharge, however, even at baseline, Meli continues with poor insight, judgement and " coping skills that pose a risk to patient in a homeless shelter, or otherwise unsupervised setting. Meli is likely to decompensate rapidly, thus, becoming a risk of danger to self/others. Case management is assertively/actively working on securing the necessary level of care and community resources.     Psychiatric Review of Systems:  Behavior over the last 24 hours:  unchanged.   Sleep: good  Appetite: good  Medication side effects: none  ROS: no complaints, denies shortness of breath or chest pain and all other systems are negative for acute changes        Objective    Vitals:  Vitals:    12/20/24 0745   BP: 136/67   Pulse: 74   Resp: 17   Temp: (!) 96.6 °F (35.9 °C)   SpO2: 92%       Laboratory Results:  I have personally reviewed all pertinent laboratory/tests results.  Most Recent Labs:   Lab Results   Component Value Date    WBC 7.60 12/16/2024    RBC 4.32 12/16/2024    HGB 13.7 12/16/2024    HCT 42.8 12/16/2024     12/16/2024    RDW 13.4 12/16/2024    NEUTROABS 3.02 12/16/2024    SODIUM 141 12/15/2024    K 3.9 12/15/2024     12/15/2024    CO2 28 12/15/2024    BUN 17 12/15/2024    CREATININE 1.14 12/15/2024    GLUC 138 12/15/2024    GLUF 98 08/10/2024    CALCIUM 9.1 12/15/2024    AST 15 12/15/2024    ALT 14 12/15/2024    ALKPHOS 83 12/15/2024    TP 6.6 12/15/2024    ALB 4.0 12/15/2024    TBILI 0.26 12/15/2024    VALPROICTOT 19 (L) 12/15/2024    AMMONIA 32 07/03/2024    [id number] 2.000 07/24/2024    HGBA1C 6.4 (H) 05/03/2024     05/03/2024       Mental Status Evaluation:  Appearance:  adequate grooming, disheveled   Behavior:  cooperative, calm, bizarre   Speech:  normal rate and volume   Mood:  less anxious, less depressed   Affect:  constricted, slightly brighter   Thought Process:  perseverative   Thought Content:  Mandaen and somatic preoccupation, paranoid ideation   Perceptual Disturbances: auditory hallucinations still present, but less frequent   Risk Potential: Suicidal  ideation - None  Homicidal ideation - None  Potential for aggression - No   Memory:  recent and remote memory grossly intact   Sensorium  person, place, and time/date      Consciousness:  alert and awake   Attention: attention span and concentration are age appropriate   Insight:  limited   Judgment: limited   Gait/Station: normal gait/station   Motor Activity: no abnormal movements       Counseling / Coordination of Care:  Patient's progress reviewed with nursing staff.  Medications, treatment progress and treatment plan reviewed with patient.  Supportive counseling provided to the patient.    This note was completed in part utilizing Dragon dictation Software. Grammatical, translation, syntax errors, random word insertions, spelling mistakes, and incomplete sentences may be an occasional consequence of this system secondary to software limitations with voice recognition, ambient noise, and hardware issues. If you have any questions or concerns about the content, text, or information contained within the body of this dictation, please contact the provider for clarification

## 2024-12-20 NOTE — TREATMENT PLAN
TREATMENT PLAN REVIEW - Behavioral Health Meli Nowak 59 y.o. 1965 female MRN: [id number]    [facility] SH 6B [facility] Encounter: [id number]          Admit Date/Time:  7/23/2024  1:27 PM    Treatment Team:   MD Marie Carter CRNP Thomas Dale Sugalski, PhD  Jaye Murguia, BERKLEY French, MSW    Diagnosis: Principal Problem:    Schizoaffective disorder, bipolar type (HCC)  Active Problems:    Medical clearance for psychiatric admission    Type 2 diabetes mellitus (HCC)    Obesity    Tobacco abuse    Hyperlipidemia    Esophageal reflux    Essential (primary) hypertension    Vitamin B12 deficiency    Hypoxia    Ambulatory dysfunction    ASHLIE (obstructive sleep apnea)      Patient Strengths/Assets: negotiates basic needs    Patient Barriers/Limitations: difficulty adapting, lack of social/family support, limited support system, patient is on an involuntary commitment, poor insight, poor past treatment response, poor reasoning ability, poor self-care    Short Term Goals: decrease in paranoid thoughts, decrease in psychotic symptoms, ability to stay safe on the unit, ability to stay free of restraints, improvement in ability to express basic needs, improvement in reasoning ability, sleep improvement, mood stabilization, increase in socialization with peers on the unit    Long Term Goals: improvement in depression, improvement in anxiety, stabilization of mood, free of suicidal thoughts, free of homicidal thoughts, resolution of psychotic symptoms, improvement in reality testing, improvement in reasoning ability, improved insight, able to express basic needs, adequate self care, adequate sleep, adequate oral intake, appropriate interaction with peers, stable living arrangements upon discharge    Progress Towards Goals: continue psychiatric  medications as prescribed, discharge planning    Recommended Treatment: medication management, patient medication education, group therapy, milieu therapy, continued Behavioral Health psychiatric evaluation/assessment process    Treatment Frequency: daily medication monitoring, group and milieu therapy daily, monitoring through interdisciplinary rounds, monitoring through weekly patient care conferences    Expected Discharge Date:  30 days    Discharge Plan: placement in alternative living arrangement, referral for outpatient medication management with a psychiatrist, referral for outpatient psychotherapy    Treatment Plan Created/Updated By: Dereje Banuelos MD

## 2024-12-20 NOTE — NURSING NOTE
Meli remains isolative to her room but cooperative. Meli remains with many somatic c/o body aches. Meli denies all psych s/s. Meli is medication compliant. Emotional support provided. Will continue to monitor and support during treatment.

## 2024-12-20 NOTE — PROGRESS NOTES
12/20/24 1100   Activity/Group Checklist   Group Wellness  (ocean meditation DVD)   Attendance Attended   Attendance Duration (min) 46-60   Interactions Interacted appropriately   Affect/Mood Appropriate;Calm;Normal range   Goals Achieved Able to listen to others;Able to engage in interactions

## 2024-12-20 NOTE — PLAN OF CARE
Problem: Prexisting or High Potential for Compromised Skin Integrity  Goal: Skin integrity is maintained or improved  Description: INTERVENTIONS:  - Identify patients at risk for skin breakdown  - Assess and monitor skin integrity  - Assess and monitor nutrition and hydration status  - Monitor labs   - Assess for incontinence   - Turn and reposition patient  - Assist with mobility/ambulation  - Relieve pressure over bony prominences  - Avoid friction and shearing  - Provide appropriate hygiene as needed including keeping skin clean and dry  - Evaluate need for skin moisturizer/barrier cream  - Collaborate with interdisciplinary team   - Patient/family teaching  - Consider wound care consult   Outcome: Progressing     Problem: Alteration in Thoughts and Perception  Goal: Refrain from acting on delusional thinking/internal stimuli  Description: Interventions:  - Monitor patient closely, per order   - Utilize least restrictive measures   - Set reasonable limits, give positive feedback for acceptable   - Administer medications as ordered and monitor of potential side effects  Outcome: Progressing     Problem: Ineffective Coping  Goal: Participates in unit activities  Description: Interventions:  - Provide therapeutic environment   - Provide required programming   - Redirect inappropriate behaviors   Outcome: Progressing  Goal: Free from restraint events  Description: - Utilize least restrictive measures   - Provide behavioral interventions   - Redirect inappropriate behaviors   Outcome: Progressing     Problem: Anxiety  Goal: Anxiety is at manageable level  Description: Interventions:  - Assess and monitor patient's anxiety level.   - Monitor for signs and symptoms (heart palpitations, chest pain, shortness of breath, headaches, nausea, feeling jumpy, restlessness, irritable, apprehensive).   - Collaborate with interdisciplinary team and initiate plan and interventions as ordered.  - [place] patient to  unit/surroundings  - Explain treatment plan  - Encourage participation in care  - Encourage verbalization of concerns/fears  - Identify coping mechanisms  - Assist in developing anxiety-reducing skills  - Administer/offer alternative therapies  - Limit or eliminate stimulants  Outcome: Progressing     Problem: Potential for Falls  Goal: Patient will remain free of falls  Description: INTERVENTIONS:  - Educate patient on patient safety including physical limitations  - Instruct patient to call for assistance with activity   - Consult OT/PT to assist with strengthening/mobility   - Keep Call bell within reach  - Keep bed low and locked with side rails adjusted as appropriate  - Keep care items and personal belongings within reach  - Initiate and maintain comfort rounds  - Offer Toileting every 2 Hours, in advance of need  - Initiate/Maintain bed and chair alarm  - Obtain necessary fall risk management equipment: walker, wheelchair  - Apply yellow socks and bracelet for high fall risk patients  - Patient moved to room near nurses station  Outcome: Progressing

## 2024-12-20 NOTE — TREATMENT TEAM
12/19/24 2031   Pain Assessment   Pain Assessment Tool 0-10   Pain Score 10 - Worst Possible Pain   Pain Location/Orientation Location: Generalized   Pain Onset/Description Onset: Ongoing   Patient's Stated Pain Goal No pain   Hospital Pain Intervention(s) Medication (See MAR)     Tylenol 975mg administered as per order for generalized body pain.

## 2024-12-20 NOTE — TREATMENT TEAM
12/20/24 0800   Team Meeting   Meeting Type Daily Rounds   Team Members Present   Team Members Present Physician;Nurse;;;Occupational Therapist   Physician Team Member Dr. Banuelos / Dr. Hurst / Dr. Bland / JAIR Ziegler   Nursing Team Member David/Keith   Care Management Team Member Manju / Tee   Social Work Team Member Anastacio   OT Team Member Eduardo   Patient/Family Present   Patient Present No   Patient's Family Present No     Pt reported to be somatic with body pain due to bed change. Pt medication compliant. Discharge pending community resources to return home.

## 2024-12-20 NOTE — NURSING NOTE
Patient is visible on the unit intermittently but spends most of her time in bed. Patient encouraged to be more visible on the unit and spend time OOB after c/o body discomfort from her bed but she remains withdrawn to her room and laying in bed. Patient endorses anxiety and reports the radio is helping her cope. She is medication compliant. Encouraged to inform staff of any needs or concerns.

## 2024-12-21 PROCEDURE — 99232 SBSQ HOSP IP/OBS MODERATE 35: CPT | Performed by: STUDENT IN AN ORGANIZED HEALTH CARE EDUCATION/TRAINING PROGRAM

## 2024-12-21 RX ADMIN — PRAZOSIN HYDROCHLORIDE 5 MG: 5 CAPSULE ORAL at 21:19

## 2024-12-21 RX ADMIN — CLONAZEPAM 1 MG: 1 TABLET ORAL at 21:19

## 2024-12-21 RX ADMIN — CLONAZEPAM 0.5 MG: 0.5 TABLET ORAL at 08:04

## 2024-12-21 RX ADMIN — FAMOTIDINE 20 MG: 20 TABLET ORAL at 08:04

## 2024-12-21 RX ADMIN — VALPROIC ACID 500 MG: 500 SOLUTION ORAL at 21:19

## 2024-12-21 RX ADMIN — POLYETHYLENE GLYCOL 3350 17 G: 17 POWDER, FOR SOLUTION ORAL at 08:03

## 2024-12-21 RX ADMIN — FLUTICASONE FUROATE 1 PUFF: 100 POWDER RESPIRATORY (INHALATION) at 08:05

## 2024-12-21 RX ADMIN — FAMOTIDINE 20 MG: 20 TABLET ORAL at 17:09

## 2024-12-21 RX ADMIN — SENNOSIDES AND DOCUSATE SODIUM 2 TABLET: 8.6; 5 TABLET ORAL at 21:19

## 2024-12-21 RX ADMIN — Medication 15 ML: at 08:04

## 2024-12-21 RX ADMIN — MELATONIN TAB 3 MG 3 MG: 3 TAB at 21:19

## 2024-12-21 RX ADMIN — CYANOCOBALAMIN TAB 1000 MCG 1000 MCG: 1000 TAB at 08:04

## 2024-12-21 RX ADMIN — ACETAMINOPHEN 975 MG: 325 TABLET, FILM COATED ORAL at 08:26

## 2024-12-21 RX ADMIN — CLOZAPINE 900 MG: 200 TABLET ORAL at 21:19

## 2024-12-21 RX ADMIN — TRAZODONE HYDROCHLORIDE 50 MG: 50 TABLET ORAL at 21:20

## 2024-12-21 RX ADMIN — ATORVASTATIN CALCIUM 10 MG: 10 TABLET, FILM COATED ORAL at 08:04

## 2024-12-21 RX ADMIN — CARVEDILOL 6.25 MG: 6.25 TABLET, FILM COATED ORAL at 08:04

## 2024-12-21 NOTE — TREATMENT TEAM
12/20/24 2005   Pain Assessment Post Intervention   Pain Assessment Tool Used: 0-10   Post Intervention Pain Score 4   Post Intervention Pain Location/Orientation Location: Generalized   Post Intervention POSS 1   Response to Interventions mildly effective     PRN Tylenol was mildly effective.

## 2024-12-21 NOTE — NURSING NOTE
Patient withdrawn to room. She is cooperative and compliant with medications. Patient endorse anxiety due to pain in her wrist and generalized pain, PRN given. She currently denies depression, SI and AVH. Will frequently monitor.

## 2024-12-21 NOTE — NURSING NOTE
Patient is visible on the unit intermittently. She is labile in mood transitioning from paranoia to being overly bright and laughing. Patient denies all psych s/s. She is medication compliant and cooperative with mouth checks. Encouraged to inform staff of any needs or concerns.

## 2024-12-21 NOTE — PLAN OF CARE
Problem: Prexisting or High Potential for Compromised Skin Integrity  Goal: Skin integrity is maintained or improved  Description: INTERVENTIONS:  - Identify patients at risk for skin breakdown  - Assess and monitor skin integrity  - Assess and monitor nutrition and hydration status  - Monitor labs   - Assess for incontinence   - Turn and reposition patient  - Assist with mobility/ambulation  - Relieve pressure over bony prominences  - Avoid friction and shearing  - Provide appropriate hygiene as needed including keeping skin clean and dry  - Evaluate need for skin moisturizer/barrier cream  - Collaborate with interdisciplinary team   - Patient/family teaching  - Consider wound care consult   Outcome: Progressing     Problem: Alteration in Thoughts and Perception  Goal: Treatment Goal: Gain control of psychotic behaviors/thinking, reduce/eliminate presenting symptoms and demonstrate improved reality functioning upon discharge  Outcome: Progressing  Goal: Verbalize thoughts and feelings  Description: Interventions:  - Promote a nonjudgmental and trusting relationship with the patient through active listening and therapeutic communication  - Assess patient's level of functioning, behavior and potential for risk  - Engage patient in 1 on 1 interactions  - Encourage patient to express fears, feelings, frustrations, and discuss symptoms    - [place] patient to reality, help patient recognize reality-based thinking   - Administer medications as ordered and assess for potential side effects  - Provide the patient education related to the signs and symptoms of the illness and desired effects of prescribed medications  Outcome: Progressing  Goal: Refrain from acting on delusional thinking/internal stimuli  Description: Interventions:  - Monitor patient closely, per order   - Utilize least restrictive measures   - Set reasonable limits, give positive feedback for acceptable   - Administer medications as ordered and monitor  of potential side effects  Outcome: Progressing  Goal: Agree to be compliant with medication regime, as prescribed and report medication side effects  Description: Interventions:  - Offer appropriate PRN medication and supervise ingestion; conduct AIMS, as needed   Outcome: Progressing  Goal: Attend and participate in unit activities, including therapeutic, recreational, and educational groups  Description: Interventions:  -Encourage Visitation and family involvement in care  Outcome: Progressing  Goal: Recognize dysfunctional thoughts, communicate reality-based thoughts at the time of discharge  Description: Interventions:  - Provide medication and psycho-education to assist patient in compliance and developing insight into his/her illness   Outcome: Progressing  Goal: Complete daily ADLs, including personal hygiene independently, as able  Description: Interventions:  - Observe, teach, and assist patient with ADLS  - Monitor and promote a balance of rest/activity, with adequate nutrition and elimination   Outcome: Progressing     Problem: Ineffective Coping  Goal: Identifies ineffective coping skills  Outcome: Progressing  Goal: Demonstrates healthy coping skills  Outcome: Progressing  Goal: Participates in unit activities  Description: Interventions:  - Provide therapeutic environment   - Provide required programming   - Redirect inappropriate behaviors   Outcome: Progressing  Goal: Patient/Family participate in treatment and DC plans  Description: Interventions:  - Provide therapeutic environment  Outcome: Progressing  Goal: Patient/Family verbalizes awareness of resources  Outcome: Progressing  Goal: Understands least restrictive measures  Description: Interventions:  - Utilize least restrictive behavior  Outcome: Progressing  Goal: Free from restraint events  Description: - Utilize least restrictive measures   - Provide behavioral interventions   - Redirect inappropriate behaviors   Outcome: Progressing      Problem: Risk for Self Injury/Neglect  Goal: Treatment Goal: Remain safe during length of stay, learn and adopt new coping skills, and be free of self-injurious ideation, impulses and acts at the time of discharge  Outcome: Progressing  Goal: Verbalize thoughts and feelings  Description: Interventions:  - Assess and re-assess patient's lethality and potential for self-injury  - Engage patient in 1:1 interactions, daily, for a minimum of 15 minutes  - Encourage patient to express feelings, fears, frustrations, hopes  - Establish rapport/trust with patient   Outcome: Progressing  Goal: Refrain from harming self  Description: Interventions:  - Monitor patient closely, per order  - Develop a trusting relationship  - Supervise medication ingestion, monitor effects and side effects   Outcome: Progressing  Goal: Attend and participate in unit activities, including therapeutic, recreational, and educational groups  Description: Interventions:  - Provide therapeutic and educational activities daily, encourage attendance and participation, and document same in the medical record  - Obtain collateral information, encourage visitation and family involvement in care   Outcome: Progressing  Goal: Recognize maladaptive responses and adopt new coping mechanisms  Outcome: Progressing     Problem: Depression  Goal: Treatment Goal: Demonstrate behavioral control of depressive symptoms, verbalize feelings of improved mood/affect, and adopt new coping skills prior to discharge  Outcome: Progressing  Goal: Verbalize thoughts and feelings  Description: Interventions:  - Assess and re-assess patient's level of risk   - Engage patient in 1:1 interactions, daily, for a minimum of 15 minutes   - Encourage patient to express feelings, fears, frustrations, hopes   Outcome: Progressing  Goal: Refrain from isolation  Description: Interventions:  - Develop a trusting relationship   - Encourage socialization   Outcome: Progressing  Goal: Refrain  from self-neglect  Outcome: Progressing  Goal: Attend and participate in unit activities, including therapeutic, recreational, and educational groups  Description: Interventions:  - Provide therapeutic and educational activities daily, encourage attendance and participation, and document same in the medical record   Outcome: Progressing     Problem: Anxiety  Goal: Anxiety is at manageable level  Description: Interventions:  - Assess and monitor patient's anxiety level.   - Monitor for signs and symptoms (heart palpitations, chest pain, shortness of breath, headaches, nausea, feeling jumpy, restlessness, irritable, apprehensive).   - Collaborate with interdisciplinary team and initiate plan and interventions as ordered.  - [place] patient to unit/surroundings  - Explain treatment plan  - Encourage participation in care  - Encourage verbalization of concerns/fears  - Identify coping mechanisms  - Assist in developing anxiety-reducing skills  - Administer/offer alternative therapies  - Limit or eliminate stimulants  Outcome: Progressing     Problem: SAFETY,RESTRAINT: NV/NON-SELF DESTRUCTIVE BEHAVIOR  Goal: Remains free of harm/injury (restraint for non violent/non self-detsructive behavior)  Description: INTERVENTIONS:  - Instruct patient/family regarding restraint use   - Assess and monitor physiologic and psychological status   - Provide interventions and comfort measures to meet assessed patient needs   - Identify and implement measures to help patient regain control  - Assess readiness for release of restraint   Outcome: Progressing  Goal: Returns to optimal restraint-free functioning  Description: INTERVENTIONS:  - Assess the patient's behavior and symptoms that indicate continued need for restraint  - Identify and implement measures to help patient regain control  - Assess readiness for release of restraint   Outcome: Progressing     Problem: Potential for Falls  Goal: Patient will remain free of  falls  Description: INTERVENTIONS:  - Educate patient on patient safety including physical limitations  - Instruct patient to call for assistance with activity   - Consult OT/PT to assist with strengthening/mobility   - Keep Call bell within reach  - Keep bed low and locked with side rails adjusted as appropriate  - Keep care items and personal belongings within reach  - Initiate and maintain comfort rounds  - Offer Toileting every 2 Hours, in advance of need  - Initiate/Maintain bed and chair alarm  - Obtain necessary fall risk management equipment: walker, wheelchair  - Apply yellow socks and bracelet for high fall risk patients  - Patient moved to room near nurses station  Outcome: Progressing     Problem: Nutrition/Hydration-ADULT  Goal: Nutrient/Hydration intake appropriate for improving, restoring or maintaining nutritional needs  Description: Monitor and assess patient's nutrition/hydration status for malnutrition. Collaborate with interdisciplinary team and initiate plan and interventions as ordered.  Monitor patient's weight and dietary intake as ordered or per policy. Utilize nutrition screening tool and intervene as necessary. Determine patient's food preferences and provide high-protein, high-caloric foods as appropriate.     INTERVENTIONS:  - Monitor oral intake, urinary output, labs, and treatment plans  - Assess nutrition and hydration status and recommend course of action  - Evaluate amount of meals eaten  - Assist patient with eating if necessary   - Allow adequate time for meals  - Recommend/ encourage appropriate diets, oral nutritional supplements, and vitamin/mineral supplements  - Order, calculate, and assess calorie counts as needed  - Recommend, monitor, and adjust tube feedings and TPN/PPN based on assessed needs  - Assess need for intravenous fluids  - Provide specific nutrition/hydration education as appropriate  - Include patient/family/caregiver in decisions related to  nutrition  Outcome: Progressing

## 2024-12-21 NOTE — ASSESSMENT & PLAN NOTE
No medication changes today  Depakote to 500 mg qhs (12/12/24) for mood stabilization   VPA level 12/15-19  Most recent VPA level 54 on 10/12/24  CMP on 10/12/24 reviewed, WNL  Clozaril 900 mg nightly for schizoaffective disorder  Clozaril monitoring:   CRP, CBC/diff, CK QMon for monitoring  ANC: 3.02 (12/16/24)  Most recent ECG reviewed 12/15/24 - normal ECG, NSR  Clozaril increased to 900 mg nightly on 10/29/24 - Clozaril level 1053 on 12/19/24 - will continue to monitor the level  Risperdal discontinued on 10/28/24 for moderate affectivity  Ativan changed to Klonopin 0.5 mg daily/ 1 mg qhs    Continue melatonin 3 mg nightly for interrupted sleep  Continue prazosin 5 mg nightly for PTSD associated nightmares; doses to be adjusted as indicated   Continue trazodone 50 mg nightly for insomnia  Discharge disposition: D/C 1/6 with ICM to home with follow up services

## 2024-12-21 NOTE — TREATMENT TEAM
12/20/24 1905   Pain Assessment   Pain Assessment Tool 0-10   Pain Score 8   Pain Location/Orientation Location: Generalized   Pain Radiating Towards NA   Pain Onset/Description Onset: Ongoing   Effect of Pain on Daily Activities Activity   Patient's Stated Pain Goal No pain   Hospital Pain Intervention(s) Medication (See MAR)   Multiple Pain Sites No     PRN Tylenol 975 mg given for generalized pain at 1905.

## 2024-12-21 NOTE — PROGRESS NOTES
Progress Note - Behavioral Health   Name: Meli Nowak 59 y.o. female I MRN: [id number]  Unit/Bed#: [facility] 651-02 I Date of Admission: 7/23/2024   Date of Service: 12/21/2024 I Hospital Day: 151     Assessment & Plan  Schizoaffective disorder, bipolar type (HCC)  No medication changes today  Depakote to 500 mg qhs (12/12/24) for mood stabilization   VPA level 12/15-19  Most recent VPA level 54 on 10/12/24  CMP on 10/12/24 reviewed, WNL  Clozaril 900 mg nightly for schizoaffective disorder  Clozaril monitoring:   CRP, CBC/diff, CK QMon for monitoring  ANC: 3.02 (12/16/24)  Most recent ECG reviewed 12/15/24 - normal ECG, NSR  Clozaril increased to 900 mg nightly on 10/29/24 - Clozaril level 1053 on 12/19/24 - will continue to monitor the level  Risperdal discontinued on 10/28/24 for moderate affectivity  Ativan changed to Klonopin 0.5 mg daily/ 1 mg qhs    Continue melatonin 3 mg nightly for interrupted sleep  Continue prazosin 5 mg nightly for PTSD associated nightmares; doses to be adjusted as indicated   Continue trazodone 50 mg nightly for insomnia  Discharge disposition: D/C 1/6 with ICM to home with follow up services  ASHLIE (obstructive sleep apnea)  - f/u w/ Sleep Medicine in outpatient setting and consider CPAP as indicated      Plan   Progress Toward Goals: mood is stabilizing, discharge planning, placement pending    Recommended Treatment:    - Encourage early mobility and having a structured day  - Provide frequent re-orientation, and cognitive stimulation  - Ensure assistive devices are in proper working order (eye-glasses, hearing aids)  - Encourage adequate hydration, nutrition and monitor bowel movements  - Maintain sleep-wake cycle: Uninterrupted sleep time; low-level lighting at night  - Fall precaution  - f/u SLIM recs regarding the medical problems   - f/u labs  - Weekly ANC on Mondays  - Continue medication titration and treatment plan; adjust medication to optimize treatment response and as  clinically indicated. .       Current medications:  Current Facility-Administered Medications   Medication Dose Route Frequency Provider Last Rate    acetaminophen  650 mg Oral Q4H PRN Marie Ziegler, CRNP      acetaminophen  650 mg Oral Q4H PRN Marie Ziegler, CRNP      acetaminophen  975 mg Oral Q6H PRN Marie Ziegler, CRNP      albuterol  2 puff Inhalation Q4H PRN Kristen Logan, CRNP      aluminum-magnesium hydroxide-simethicone  30 mL Oral Q4H PRN Parris Bolanos, CRNP      Artificial Tears  1 drop Both Eyes Q6H PRN Dereje Banuelos MD      atorvastatin  10 mg Oral Daily Marie Ziegler, CRNP      bisacodyl  10 mg Oral Daily PRN Kristen Logan, CRNP      bisacodyl  10 mg Rectal Daily PRN Marie Ziegler, CRNP      carvedilol  6.25 mg Oral BID With Meals Marie Ziegler, CRNP      clonazePAM  0.5 mg Oral Daily Marie Ziegler, CRNP      clonazePAM  1 mg Oral HS Marie Ziegler, CRNP      cloZAPine  900 mg Oral HS Marie Ziegler, CRNP      cyanocobalamin  1,000 mcg Oral Daily Marie Ziegler, CRNP      Diclofenac Sodium  2 g Topical 4x Daily PRN Marie Ziegler, CRNP      famotidine  20 mg Oral BID Shan Fitzgerald DO      fluticasone  1 puff Inhalation Daily Marie Ziegler, CRNP      hydrOXYzine HCL  25 mg Oral Q6H PRN Max 4/day Marie Ziegler, CRNP      hydrOXYzine HCL  50 mg Oral Q6H PRN Max 4/day Marie Ziegler, CRNP      LORazepam  1 mg Intramuscular Q6H PRN Max 3/day Marie Ziegler, CRNP      LORazepam  1 mg Oral Q6H PRN Max 3/day Marie Ziegler, CRNP      melatonin  3 mg Oral HS Marie Ziegler, CRNP      mirtazapine  7.5 mg Oral HS PRN Marie Ziegler, CRNP      Multivitamin  15 mL Oral Daily Marie Ziegler, CRNP      OLANZapine  2.5 mg Oral Q6H PRN Dereje Banuelos MD      Or    OLANZapine  2.5 mg Intramuscular Q6H PRN Dereje Banuelos MD      OLANZapine  5 mg Oral Q6H PRN Dereje Banuelos MD      Or    OLANZapine  5 mg Intramuscular Q6H PRN Dereje Banuelos MD      ondansetron  4 mg  "Oral Q6H PRN Darin Lawton MD      polyethylene glycol  17 g Oral Daily Kristen Ludwig JOSE ELIAS Logan      polyethylene glycol  17 g Oral Daily PRN JOSE ELIAS Figueroa      prazosin  5 mg Oral HS JOSE ELIAS Figueroa      senna-docusate sodium  2 tablet Oral HS Rehana Borrego PA-C      traZODone  50 mg Oral HS JOSE ELIAS Figueroa      valproic acid  500 mg Oral HS JOSE ELIAS Figueroa          - Observation: routine            - VS: as per unit protocol  - Diet: Regular diet  - Encourage group attendance and milieu therapy  - Dispo: To be determined   - Estimated Discharge Date: 1/6/2025     Subjective     Patient was visited on unit for continuing care; chart reviewed and discussed with the nursing staff.  On approach, the patient was calm, pleasant and cooperative. Denied any changes in mood, appetite, and energy level. She noted that she missed her cat \"[place]estone\" a lot and is looking forward to hugging her cat and be at home after the Holidays when outpatient services could be provided to the patient.  No problem initiating and maintaining sleep.  Continues to report vague AH at times but reportedly has been less intense and does not bother the patient as much. Denied A/VH currently.  Denied SI/HI, intent or plan upon direct inquiry at this time.    Patient continues to be visible in the milieu and remains interactive with staff and peers but requires frequent redirection and reorientation by the staff. No reports of aggression or self-injurious behavior on unit. No PRN medications used in the past 24 hours.    Patient accepted all offered medications and no adverse effects of medications noted or reported. Weekly ANC on Monday. Last BM was this morning and denied constipation. The patient has to remain in the hospital while awaiting placement, as their mental illness does not allow for treatment at a lower level of care. Case management is assertively/actively working on securing the necessary level " "of care and proving services in the community upon discharge.    Objective    Current Mental Status Evaluation:  Appearance and attitude: appeared as stated age, dressed in hospital attire, with good hygiene  Eye contact: good  Motor Function: within normal limits, No PMA/PMR  Gait/station: Not observed  Speech: normal for rate, rhythm, volume, and latency with decreased amount  Language: No overt abnormality  Mood/affect: \"good\" / Affect was constricted but reactive, mood congruent, brighter  Thought Processes: concrete, ruminating  Thought content: denied suicidal ideations or homicidal ideations, no overt delusions elicited, ruminations  Associations: concrete associations  Perceptual disturbances: vague auditory hallucinations, no visual hallucinations  Orientation: oriented to time, person, place and to the situational context  Cognitive Function: intact  Memory: not cooperative with formal MMSE  Fund of knowledge: aware of current events, aware of past history, and vocabulary average  Impulse control: good  Insight/judgment: limited/limited          Vital signs in last 24 hours:    Temp:  [96.4 °F (35.8 °C)-97.8 °F (36.6 °C)] 97 °F (36.1 °C)  HR:  [68-85] 79  BP: (102-131)/(62-77) 116/62  Resp:  [16-18] 17  SpO2:  [94 %-95 %] 95 %  O2 Device: None (Room air)      Lab results: I have personally reviewed all pertinent laboratory/tests results      Counseling / Coordination of Care  Patient's progress reviewed with nursing staff.  Medications, treatment progress and treatment plan reviewed with patient.  Medication education provided to patient.  Supportive therapy provided to patient.  Cognitive techniques utilized during the session.  Reassurance and supportive therapy provided.  Reoriented to reality and reassured.  Encouraged participation in milieu and group therapy on the unit.  Crisis/safety plan discussed with patient.  Discharge plan discussed with patient.       Dereje Banuelos MD  Attending Psychiatrist "   [facility]       This note was completed in part utilizing Dragon dictation Software. Grammatical, translation, syntax errors, random word insertions, spelling mistakes, and incomplete sentences may be an occasional consequence of this system secondary to software limitations with voice recognition, ambient noise, and hardware issues. If you have any questions or concerns about the content, text, or information contained within the body of this dictation, please contact the provider for clarification.

## 2024-12-22 PROCEDURE — 99232 SBSQ HOSP IP/OBS MODERATE 35: CPT | Performed by: STUDENT IN AN ORGANIZED HEALTH CARE EDUCATION/TRAINING PROGRAM

## 2024-12-22 RX ADMIN — ACETAMINOPHEN 975 MG: 325 TABLET, FILM COATED ORAL at 08:14

## 2024-12-22 RX ADMIN — ATORVASTATIN CALCIUM 10 MG: 10 TABLET, FILM COATED ORAL at 08:14

## 2024-12-22 RX ADMIN — FLUTICASONE FUROATE 1 PUFF: 100 POWDER RESPIRATORY (INHALATION) at 08:16

## 2024-12-22 RX ADMIN — FAMOTIDINE 20 MG: 20 TABLET ORAL at 17:01

## 2024-12-22 RX ADMIN — TRAZODONE HYDROCHLORIDE 50 MG: 50 TABLET ORAL at 21:27

## 2024-12-22 RX ADMIN — Medication 15 ML: at 08:14

## 2024-12-22 RX ADMIN — CLOZAPINE 900 MG: 200 TABLET ORAL at 21:27

## 2024-12-22 RX ADMIN — FAMOTIDINE 20 MG: 20 TABLET ORAL at 08:14

## 2024-12-22 RX ADMIN — CARVEDILOL 6.25 MG: 6.25 TABLET, FILM COATED ORAL at 08:14

## 2024-12-22 RX ADMIN — MELATONIN TAB 3 MG 3 MG: 3 TAB at 21:27

## 2024-12-22 RX ADMIN — SENNOSIDES AND DOCUSATE SODIUM 2 TABLET: 8.6; 5 TABLET ORAL at 21:27

## 2024-12-22 RX ADMIN — VALPROIC ACID 500 MG: 500 SOLUTION ORAL at 21:27

## 2024-12-22 RX ADMIN — CLONAZEPAM 0.5 MG: 0.5 TABLET ORAL at 08:14

## 2024-12-22 RX ADMIN — PRAZOSIN HYDROCHLORIDE 5 MG: 5 CAPSULE ORAL at 21:27

## 2024-12-22 RX ADMIN — CARVEDILOL 6.25 MG: 6.25 TABLET, FILM COATED ORAL at 16:54

## 2024-12-22 RX ADMIN — CYANOCOBALAMIN TAB 1000 MCG 1000 MCG: 1000 TAB at 08:14

## 2024-12-22 RX ADMIN — POLYETHYLENE GLYCOL 3350 17 G: 17 POWDER, FOR SOLUTION ORAL at 08:13

## 2024-12-22 RX ADMIN — CLONAZEPAM 1 MG: 1 TABLET ORAL at 21:27

## 2024-12-22 NOTE — PLAN OF CARE
Problem: Alteration in Thoughts and Perception  Goal: Treatment Goal: Gain control of psychotic behaviors/thinking, reduce/eliminate presenting symptoms and demonstrate improved reality functioning upon discharge  Outcome: Progressing  Goal: Verbalize thoughts and feelings  Description: Interventions:  - Promote a nonjudgmental and trusting relationship with the patient through active listening and therapeutic communication  - Assess patient's level of functioning, behavior and potential for risk  - Engage patient in 1 on 1 interactions  - Encourage patient to express fears, feelings, frustrations, and discuss symptoms    - [place] patient to reality, help patient recognize reality-based thinking   - Administer medications as ordered and assess for potential side effects  - Provide the patient education related to the signs and symptoms of the illness and desired effects of prescribed medications  Outcome: Progressing  Goal: Refrain from acting on delusional thinking/internal stimuli  Description: Interventions:  - Monitor patient closely, per order   - Utilize least restrictive measures   - Set reasonable limits, give positive feedback for acceptable   - Administer medications as ordered and monitor of potential side effects  Outcome: Progressing  Goal: Agree to be compliant with medication regime, as prescribed and report medication side effects  Description: Interventions:  - Offer appropriate PRN medication and supervise ingestion; conduct AIMS, as needed   Outcome: Progressing  Goal: Recognize dysfunctional thoughts, communicate reality-based thoughts at the time of discharge  Description: Interventions:  - Provide medication and psycho-education to assist patient in compliance and developing insight into his/her illness   Outcome: Progressing  Goal: Complete daily ADLs, including personal hygiene independently, as able  Description: Interventions:  - Observe, teach, and assist patient with ADLS  - Monitor and  promote a balance of rest/activity, with adequate nutrition and elimination   Outcome: Progressing     Problem: Risk for Self Injury/Neglect  Goal: Treatment Goal: Remain safe during length of stay, learn and adopt new coping skills, and be free of self-injurious ideation, impulses and acts at the time of discharge  Outcome: Progressing  Goal: Verbalize thoughts and feelings  Description: Interventions:  - Assess and re-assess patient's lethality and potential for self-injury  - Engage patient in 1:1 interactions, daily, for a minimum of 15 minutes  - Encourage patient to express feelings, fears, frustrations, hopes  - Establish rapport/trust with patient   Outcome: Progressing  Goal: Refrain from harming self  Description: Interventions:  - Monitor patient closely, per order  - Develop a trusting relationship  - Supervise medication ingestion, monitor effects and side effects   Outcome: Progressing  Goal: Recognize maladaptive responses and adopt new coping mechanisms  Outcome: Progressing     Problem: Depression  Goal: Treatment Goal: Demonstrate behavioral control of depressive symptoms, verbalize feelings of improved mood/affect, and adopt new coping skills prior to discharge  Outcome: Progressing  Goal: Verbalize thoughts and feelings  Description: Interventions:  - Assess and re-assess patient's level of risk   - Engage patient in 1:1 interactions, daily, for a minimum of 15 minutes   - Encourage patient to express feelings, fears, frustrations, hopes   Outcome: Progressing  Goal: Refrain from isolation  Description: Interventions:  - Develop a trusting relationship   - Encourage socialization   Outcome: Progressing  Goal: Refrain from self-neglect  Outcome: Progressing     Problem: Anxiety  Goal: Anxiety is at manageable level  Description: Interventions:  - Assess and monitor patient's anxiety level.   - Monitor for signs and symptoms (heart palpitations, chest pain, shortness of breath, headaches, nausea,  feeling jumpy, restlessness, irritable, apprehensive).   - Collaborate with interdisciplinary team and initiate plan and interventions as ordered.  - [place] patient to unit/surroundings  - Explain treatment plan  - Encourage participation in care  - Encourage verbalization of concerns/fears  - Identify coping mechanisms  - Assist in developing anxiety-reducing skills  - Administer/offer alternative therapies  - Limit or eliminate stimulants  Outcome: Progressing

## 2024-12-22 NOTE — NURSING NOTE
"Patient visible on the unit for meals then returns to her room to lay in bed. Patient encouraged to spend time on the unit with peers but she refuses and states \"Just come get me up when the  gets here.\" Patient showered yesterday but remains disheveled and malodorous. Patient endorses anxiety but denies all other psych s/s. She is excited to be discharged so she can see her cat, [place], and celebrate the holidays \"even if it is a little late.\" Patient is medication compliant and cooperative with mouth checks. Encouraged to inform staff of any needs or concerns.    "

## 2024-12-22 NOTE — NURSING NOTE
"Patient is calm and cooperative with care. During assessment patient reported \" I can't wait to go home and see my cat.\" Patient denies all psych s/s. Medication compliant. Patient is withdrawn to room and encouraged to come out to the milieu. Patient is able to make needs known. Safety checks ongoing.  "

## 2024-12-22 NOTE — ASSESSMENT & PLAN NOTE
No medication changes today  Depakote to 500 mg qhs (12/12/24) for mood stabilization   VPA level 12/15-19  Most recent VPA level 54 on 10/12/24  CMP on 10/12/24 reviewed, WNL  Clozaril 900 mg nightly for schizoaffective disorder  Clozaril monitoring:   Weekly ANC on Mondays  ANC: 3.02 (12/16/24)  Most recent ECG reviewed 12/15/24 - normal ECG, NSR  Clozaril increased to 900 mg nightly on 10/29/24 - Clozaril level 1053 on 12/19/24 - will continue to monitor the level  Risperdal discontinued on 10/28/24 for moderate affectivity  Continue Klonopin 0.5 mg daily/ 1 mg qhs    Continue melatonin 3 mg nightly for interrupted sleep  Continue prazosin 5 mg nightly for PTSD associated nightmares; doses to be adjusted as indicated   Continue trazodone 50 mg nightly for insomnia  Discharge disposition: D/C 1/6 with ICM to home with follow up services

## 2024-12-22 NOTE — PROGRESS NOTES
Progress Note - Behavioral Health   Name: Meli Nowak 59 y.o. female I MRN: [id number]  Unit/Bed#: [facility] 651-02 I Date of Admission: 7/23/2024   Date of Service: 12/22/2024 I Hospital Day: 152     Assessment & Plan  Schizoaffective disorder, bipolar type (HCC)  No medication changes today  Depakote to 500 mg qhs (12/12/24) for mood stabilization   VPA level 12/15-19  Most recent VPA level 54 on 10/12/24  CMP on 10/12/24 reviewed, WNL  Clozaril 900 mg nightly for schizoaffective disorder  Clozaril monitoring:   Weekly ANC on Mondays  ANC: 3.02 (12/16/24)  Most recent ECG reviewed 12/15/24 - normal ECG, NSR  Clozaril increased to 900 mg nightly on 10/29/24 - Clozaril level 1053 on 12/19/24 - will continue to monitor the level  Risperdal discontinued on 10/28/24 for moderate affectivity  Continue Klonopin 0.5 mg daily/ 1 mg qhs    Continue melatonin 3 mg nightly for interrupted sleep  Continue prazosin 5 mg nightly for PTSD associated nightmares; doses to be adjusted as indicated   Continue trazodone 50 mg nightly for insomnia  Discharge disposition: D/C 1/6 with ICM to home with follow up services  ASHLIE (obstructive sleep apnea)  - f/u w/ Sleep Medicine in outpatient setting and consider CPAP as indicated      Plan   Progress Toward Goals: continues to improve, mood is stabilizing, working on coping skills, discharge planning, placement pending    Recommended Treatment:    - Encourage early mobility and having a structured day  - Provide frequent re-orientation, and cognitive stimulation  - Ensure assistive devices are in proper working order (eye-glasses, hearing aids)  - Encourage adequate hydration, nutrition and monitor bowel movements  - Maintain sleep-wake cycle: Uninterrupted sleep time; low-level lighting at night  - Fall precaution  - f/u [facility] recs regarding the medical problems   - Weekly ANC on Monday  - Continue medication titration and treatment plan; adjust medication to optimize treatment response and  as clinically indicated. .       Current medications:  Current Facility-Administered Medications   Medication Dose Route Frequency Provider Last Rate    acetaminophen  650 mg Oral Q4H PRN Marie Ziegler, CRNP      acetaminophen  650 mg Oral Q4H PRN Marie Ziegler, CRNP      acetaminophen  975 mg Oral Q6H PRN Marie Ziegler, CRNP      albuterol  2 puff Inhalation Q4H PRN Kristen Logan, CRNP      aluminum-magnesium hydroxide-simethicone  30 mL Oral Q4H PRN Parris Bolanos, CRNP      Artificial Tears  1 drop Both Eyes Q6H PRN Dereje Banuelos MD      atorvastatin  10 mg Oral Daily Marie Ziegler, CRNP      bisacodyl  10 mg Oral Daily PRN Kristen Logan, CRNP      bisacodyl  10 mg Rectal Daily PRN Marie Ziegler, CRNP      carvedilol  6.25 mg Oral BID With Meals Marie Ziegler, CRNP      clonazePAM  0.5 mg Oral Daily Marie Ziegler, CRNP      clonazePAM  1 mg Oral HS Marie Ziegler, CRNP      cloZAPine  900 mg Oral HS Marie Ziegler, CRNP      cyanocobalamin  1,000 mcg Oral Daily Marie Ziegler, CRNP      Diclofenac Sodium  2 g Topical 4x Daily PRN Marie Ziegler, CRNP      famotidine  20 mg Oral BID Shan Fitzgerald DO      fluticasone  1 puff Inhalation Daily Marie Ziegler, CRNP      hydrOXYzine HCL  25 mg Oral Q6H PRN Max 4/day Marie Ziegler, CRNP      hydrOXYzine HCL  50 mg Oral Q6H PRN Max 4/day Marie Ziegler, CRNP      LORazepam  1 mg Intramuscular Q6H PRN Max 3/day Marie Ziegler, CRNP      LORazepam  1 mg Oral Q6H PRN Max 3/day Marie Ziegler, CRNP      melatonin  3 mg Oral HS Marie Ziegler, CRNP      mirtazapine  7.5 mg Oral HS PRN Marie Ziegler, CRNP      Multivitamin  15 mL Oral Daily Marie Ziegler, CRNP      OLANZapine  2.5 mg Oral Q6H PRN Dereje Banuelos MD      Or    OLANZapine  2.5 mg Intramuscular Q6H PRN Dereje Banuelos MD      OLANZapine  5 mg Oral Q6H PRN Dereje Banuelos MD      Or    OLANZapine  5 mg Intramuscular Q6H PRN Dereje Banuelos MD      ondansetron  4  mg Oral Q6H PRN Darin Lawton MD      polyethylene glycol  17 g Oral Daily Kristen TomasJOSE ELIAS Weller      polyethylene glycol  17 g Oral Daily PRN JOSE ELIAS Figueroa      prazosin  5 mg Oral HS JOSE ELIAS Figueroa      senna-docusate sodium  2 tablet Oral HS Rehana Borrego PA-C      traZODone  50 mg Oral HS JOSE ELIAS Figueroa      valproic acid  500 mg Oral HS JOSE ELIAS Figueroa          - Observation: routine            - VS: as per unit protocol  - Encourage group attendance and milieu therapy  - Dispo: To be determined   - Estimated Discharge Date: 1/6/2025     Subjective     Patient was visited on unit for continuing care; chart reviewed and discussed with the nursing staff.  On approach, the patient was calm, pleasant and cooperative. Endorsed better mood and less anxiety sxs. Denied any changes in appetite, and energy level.  Continues to be preoccupied with discharge and was talking about writing a grocery shopping list as she is looking forward to going home after the holiday season.  No problem initiating and maintaining sleep.  Denied A/VH currently.  Continues to report intrusive thoughts and negative ruminations. Denied SI/HI, intent or plan upon direct inquiry at this time.    Patient continues to be visible in the milieu and remains interactive with staff and peers but requires frequent redirection and reorientation by the staff. No reports of aggression or self-injurious behavior on unit. No PRN medications used in the past 24 hours.    Patient accepted all offered medications and no adverse effects of medications noted or reported. Weekly ANC on Monday. Last BM was this morning and denied constipation. The patient has to remain in the hospital while awaiting placement, as their mental illness does not allow for treatment at a lower level of care. Case management is assertively/actively working on securing the necessary level of care and proving services in the community upon  "discharge.    Objective    Current Mental Status Evaluation:  Appearance and attitude: appeared as stated age, dressed in hospital attire, with good hygiene  Eye contact: good  Motor Function: within normal limits, intact gait, No PMA/PMR  Gait/station: normal gait/station and normal balance  Speech: normal for rate, rhythm, volume, latency, amount  Language: No overt abnormality  Mood/affect: \"good\" / Affect was constricted but reactive, mood congruent, brighter  Thought Processes: concrete, ruminating  Thought content: denied suicidal ideations or homicidal ideations, no overt delusions elicited, ruminations  Associations: concrete associations  Perceptual disturbances: denies Auditory/Visual/Tactile Hallucinations  Orientation: oriented to time, person, place and to the situational context  Cognitive Function: intact  Memory: recent and remote memory grossly intact  Fund of knowledge: aware of current events, aware of past history, and vocabulary average  Impulse control: good  Insight/judgment: limited/limited          Vital signs in last 24 hours:    Temp:  [97.5 °F (36.4 °C)-97.8 °F (36.6 °C)] 97.7 °F (36.5 °C)  HR:  [73-95] 77  BP: ()/(53-62) 120/53  Resp:  [16-19] 19  SpO2:  [92 %-95 %] 92 %  O2 Device: None (Room air)      Lab results: I have personally reviewed all pertinent laboratory/tests results      Counseling / Coordination of Care  Patient's progress reviewed with nursing staff.  Medications, treatment progress and treatment plan reviewed with patient.  Medication education provided to patient.  Supportive therapy provided to patient.  Cognitive techniques utilized during the session.  Reassurance and supportive therapy provided.  Reoriented to reality and reassured.  Encouraged participation in milieu and group therapy on the unit.  Crisis/safety plan discussed with patient.  Discharge plan discussed with patient.       Dereje Banuelos MD  Attending Psychiatrist   [facility] " Network       This note was completed in part utilizing Dragon dictation Software. Grammatical, translation, syntax errors, random word insertions, spelling mistakes, and incomplete sentences may be an occasional consequence of this system secondary to software limitations with voice recognition, ambient noise, and hardware issues. If you have any questions or concerns about the content, text, or information contained within the body of this dictation, please contact the provider for clarification.

## 2024-12-23 LAB
BASOPHILS # BLD AUTO: 0.08 THOUSANDS/ΜL (ref 0–0.1)
BASOPHILS NFR BLD AUTO: 1 % (ref 0–1)
CK SERPL-CCNC: 27 U/L (ref 26–192)
CRP SERPL QL: 1.7 MG/L
EOSINOPHIL # BLD AUTO: 0 THOUSAND/ΜL (ref 0–0.61)
EOSINOPHIL NFR BLD AUTO: 0 % (ref 0–6)
ERYTHROCYTE [DISTWIDTH] IN BLOOD BY AUTOMATED COUNT: 13.2 % (ref 11.6–15.1)
HCT VFR BLD AUTO: 39.8 % (ref 34.8–46.1)
HGB BLD-MCNC: 13 G/DL (ref 11.5–15.4)
IMM GRANULOCYTES # BLD AUTO: 0.01 THOUSAND/UL (ref 0–0.2)
IMM GRANULOCYTES NFR BLD AUTO: 0 % (ref 0–2)
LYMPHOCYTES # BLD AUTO: 3.81 THOUSANDS/ΜL (ref 0.6–4.47)
LYMPHOCYTES NFR BLD AUTO: 49 % (ref 14–44)
MCH RBC QN AUTO: 31.9 PG (ref 26.8–34.3)
MCHC RBC AUTO-ENTMCNC: 32.7 G/DL (ref 31.4–37.4)
MCV RBC AUTO: 98 FL (ref 82–98)
MONOCYTES # BLD AUTO: 0.75 THOUSAND/ΜL (ref 0.17–1.22)
MONOCYTES NFR BLD AUTO: 10 % (ref 4–12)
NEUTROPHILS # BLD AUTO: 3.08 THOUSANDS/ΜL (ref 1.85–7.62)
NEUTS SEG NFR BLD AUTO: 40 % (ref 43–75)
NRBC BLD AUTO-RTO: 0 /100 WBCS
PLATELET # BLD AUTO: 210 THOUSANDS/UL (ref 149–390)
PMV BLD AUTO: 9.4 FL (ref 8.9–12.7)
RBC # BLD AUTO: 4.07 MILLION/UL (ref 3.81–5.12)
WBC # BLD AUTO: 7.73 THOUSAND/UL (ref 4.31–10.16)

## 2024-12-23 PROCEDURE — 82550 ASSAY OF CK (CPK): CPT

## 2024-12-23 PROCEDURE — 86140 C-REACTIVE PROTEIN: CPT

## 2024-12-23 PROCEDURE — 99232 SBSQ HOSP IP/OBS MODERATE 35: CPT | Performed by: STUDENT IN AN ORGANIZED HEALTH CARE EDUCATION/TRAINING PROGRAM

## 2024-12-23 PROCEDURE — 85025 COMPLETE CBC W/AUTO DIFF WBC: CPT

## 2024-12-23 RX ORDER — CLONAZEPAM 1 MG/1
1 TABLET ORAL
Qty: 10 TABLET | Refills: 0 | Status: SHIPPED | OUTPATIENT
Start: 2024-12-23 | End: 2025-01-02

## 2024-12-23 RX ORDER — CLOZAPINE 100 MG/1
900 TABLET ORAL
Qty: 90 TABLET | Refills: 0 | Status: SHIPPED | OUTPATIENT
Start: 2024-12-23 | End: 2025-01-02

## 2024-12-23 RX ORDER — CARVEDILOL 6.25 MG/1
6.25 TABLET ORAL 2 TIMES DAILY WITH MEALS
Qty: 60 TABLET | Refills: 0 | Status: SHIPPED | OUTPATIENT
Start: 2024-12-24

## 2024-12-23 RX ORDER — ALBUTEROL SULFATE 90 UG/1
2 INHALANT RESPIRATORY (INHALATION) EVERY 4 HOURS PRN
Qty: 8 G | Refills: 0 | Status: SHIPPED | OUTPATIENT
Start: 2024-12-23

## 2024-12-23 RX ORDER — CLONAZEPAM 0.5 MG/1
0.5 TABLET ORAL DAILY
Qty: 10 TABLET | Refills: 0 | Status: SHIPPED | OUTPATIENT
Start: 2024-12-24 | End: 2025-01-03

## 2024-12-23 RX ORDER — POLYETHYLENE GLYCOL 3350 17 G/17G
17 POWDER, FOR SOLUTION ORAL DAILY
Qty: 510 G | Refills: 0 | Status: SHIPPED | OUTPATIENT
Start: 2024-12-24

## 2024-12-23 RX ORDER — FLUTICASONE FUROATE 100 UG/1
1 POWDER RESPIRATORY (INHALATION) DAILY
Qty: 30 BLISTER | Refills: 0 | Status: SHIPPED | OUTPATIENT
Start: 2024-12-23

## 2024-12-23 RX ORDER — PRAZOSIN HYDROCHLORIDE 5 MG/1
5 CAPSULE ORAL
Qty: 30 CAPSULE | Refills: 0 | Status: SHIPPED | OUTPATIENT
Start: 2024-12-23

## 2024-12-23 RX ORDER — FAMOTIDINE 20 MG/1
20 TABLET, FILM COATED ORAL 2 TIMES DAILY
Qty: 60 TABLET | Refills: 0 | Status: SHIPPED | OUTPATIENT
Start: 2024-12-23 | End: 2025-01-22

## 2024-12-23 RX ORDER — TRAZODONE HYDROCHLORIDE 50 MG/1
50 TABLET, FILM COATED ORAL
Qty: 30 TABLET | Refills: 0 | Status: SHIPPED | OUTPATIENT
Start: 2024-12-23

## 2024-12-23 RX ORDER — VALPROIC ACID 250 MG/5ML
500 SOLUTION ORAL
Qty: 300 ML | Refills: 0 | Status: SHIPPED | OUTPATIENT
Start: 2024-12-23

## 2024-12-23 RX ORDER — ATORVASTATIN CALCIUM 10 MG/1
10 TABLET, FILM COATED ORAL DAILY
Qty: 30 TABLET | Refills: 0 | Status: SHIPPED | OUTPATIENT
Start: 2024-12-23 | End: 2025-01-22

## 2024-12-23 RX ORDER — AMOXICILLIN 250 MG
2 CAPSULE ORAL
Qty: 60 TABLET | Refills: 0 | Status: SHIPPED | OUTPATIENT
Start: 2024-12-23

## 2024-12-23 RX ADMIN — MELATONIN TAB 3 MG 3 MG: 3 TAB at 21:20

## 2024-12-23 RX ADMIN — VALPROIC ACID 500 MG: 500 SOLUTION ORAL at 21:20

## 2024-12-23 RX ADMIN — TRAZODONE HYDROCHLORIDE 50 MG: 50 TABLET ORAL at 21:20

## 2024-12-23 RX ADMIN — ATORVASTATIN CALCIUM 10 MG: 10 TABLET, FILM COATED ORAL at 08:45

## 2024-12-23 RX ADMIN — FLUTICASONE FUROATE 1 PUFF: 100 POWDER RESPIRATORY (INHALATION) at 08:45

## 2024-12-23 RX ADMIN — Medication 15 ML: at 08:44

## 2024-12-23 RX ADMIN — SENNOSIDES AND DOCUSATE SODIUM 2 TABLET: 8.6; 5 TABLET ORAL at 21:20

## 2024-12-23 RX ADMIN — CYANOCOBALAMIN TAB 1000 MCG 1000 MCG: 1000 TAB at 08:45

## 2024-12-23 RX ADMIN — CARVEDILOL 6.25 MG: 6.25 TABLET, FILM COATED ORAL at 17:14

## 2024-12-23 RX ADMIN — CLOZAPINE 900 MG: 200 TABLET ORAL at 21:19

## 2024-12-23 RX ADMIN — CLONAZEPAM 1 MG: 1 TABLET ORAL at 21:20

## 2024-12-23 RX ADMIN — CLONAZEPAM 0.5 MG: 0.5 TABLET ORAL at 08:45

## 2024-12-23 RX ADMIN — FAMOTIDINE 20 MG: 20 TABLET ORAL at 17:14

## 2024-12-23 RX ADMIN — PRAZOSIN HYDROCHLORIDE 5 MG: 5 CAPSULE ORAL at 21:20

## 2024-12-23 RX ADMIN — POLYETHYLENE GLYCOL 3350 17 G: 17 POWDER, FOR SOLUTION ORAL at 08:44

## 2024-12-23 RX ADMIN — FAMOTIDINE 20 MG: 20 TABLET ORAL at 08:45

## 2024-12-23 RX ADMIN — CARVEDILOL 6.25 MG: 6.25 TABLET, FILM COATED ORAL at 08:15

## 2024-12-23 NOTE — ASSESSMENT & PLAN NOTE
No medication changes today  Depakote to  500 mg qhs (12/12/24) for mood stabilization   VPA level 12/15-19  Most recent VPA level 54 on 10/12/24  CMP on 10/12/24 reviewed, WNL  Clozaril 900 mg nightly for schizoaffective disorder  Clozaril monitoring:   Weekly ANC on Mondays  ANC: 3.08 (12/23/24)  Most recent ECG reviewed 12/15/24 - normal ECG, NSR  Clozaril increased to 900 mg nightly on 10/29/24 - Clozaril level 1053 on 12/19/24 - will continue to monitor the level  Risperdal discontinued on 10/28/24 for moderate affectivity  Continue Klonopin 0.5 mg daily/ 1 mg qhs    Continue melatonin 3 mg nightly for interrupted sleep  Continue prazosin 5 mg nightly for PTSD associated nightmares; doses to be adjusted as indicated   Continue trazodone 50 mg nightly for insomnia  Discharge disposition: D/C 1/6 with ICM to home with follow up services

## 2024-12-23 NOTE — PLAN OF CARE
Problem: Alteration in Thoughts and Perception  Goal: Treatment Goal: Gain control of psychotic behaviors/thinking, reduce/eliminate presenting symptoms and demonstrate improved reality functioning upon discharge  Outcome: Progressing  Goal: Verbalize thoughts and feelings  Description: Interventions:  - Promote a nonjudgmental and trusting relationship with the patient through active listening and therapeutic communication  - Assess patient's level of functioning, behavior and potential for risk  - Engage patient in 1 on 1 interactions  - Encourage patient to express fears, feelings, frustrations, and discuss symptoms    - [place] patient to reality, help patient recognize reality-based thinking   - Administer medications as ordered and assess for potential side effects  - Provide the patient education related to the signs and symptoms of the illness and desired effects of prescribed medications  Outcome: Progressing  Goal: Refrain from acting on delusional thinking/internal stimuli  Description: Interventions:  - Monitor patient closely, per order   - Utilize least restrictive measures   - Set reasonable limits, give positive feedback for acceptable   - Administer medications as ordered and monitor of potential side effects  Outcome: Progressing  Goal: Agree to be compliant with medication regime, as prescribed and report medication side effects  Description: Interventions:  - Offer appropriate PRN medication and supervise ingestion; conduct AIMS, as needed   Outcome: Progressing  Goal: Recognize dysfunctional thoughts, communicate reality-based thoughts at the time of discharge  Description: Interventions:  - Provide medication and psycho-education to assist patient in compliance and developing insight into his/her illness   Outcome: Progressing  Goal: Complete daily ADLs, including personal hygiene independently, as able  Description: Interventions:  - Observe, teach, and assist patient with ADLS  - Monitor and  promote a balance of rest/activity, with adequate nutrition and elimination   Outcome: Progressing     Problem: Risk for Self Injury/Neglect  Goal: Treatment Goal: Remain safe during length of stay, learn and adopt new coping skills, and be free of self-injurious ideation, impulses and acts at the time of discharge  Outcome: Progressing  Goal: Verbalize thoughts and feelings  Description: Interventions:  - Assess and re-assess patient's lethality and potential for self-injury  - Engage patient in 1:1 interactions, daily, for a minimum of 15 minutes  - Encourage patient to express feelings, fears, frustrations, hopes  - Establish rapport/trust with patient   Outcome: Progressing  Goal: Refrain from harming self  Description: Interventions:  - Monitor patient closely, per order  - Develop a trusting relationship  - Supervise medication ingestion, monitor effects and side effects   Outcome: Progressing  Goal: Attend and participate in unit activities, including therapeutic, recreational, and educational groups  Description: Interventions:  - Provide therapeutic and educational activities daily, encourage attendance and participation, and document same in the medical record  - Obtain collateral information, encourage visitation and family involvement in care   Outcome: Progressing  Goal: Recognize maladaptive responses and adopt new coping mechanisms  Outcome: Progressing     Problem: Depression  Goal: Treatment Goal: Demonstrate behavioral control of depressive symptoms, verbalize feelings of improved mood/affect, and adopt new coping skills prior to discharge  Outcome: Progressing  Goal: Verbalize thoughts and feelings  Description: Interventions:  - Assess and re-assess patient's level of risk   - Engage patient in 1:1 interactions, daily, for a minimum of 15 minutes   - Encourage patient to express feelings, fears, frustrations, hopes   Outcome: Progressing  Goal: Refrain from isolation  Description:  Interventions:  - Develop a trusting relationship   - Encourage socialization   Outcome: Progressing  Goal: Refrain from self-neglect  Outcome: Progressing     Problem: Anxiety  Goal: Anxiety is at manageable level  Description: Interventions:  - Assess and monitor patient's anxiety level.   - Monitor for signs and symptoms (heart palpitations, chest pain, shortness of breath, headaches, nausea, feeling jumpy, restlessness, irritable, apprehensive).   - Collaborate with interdisciplinary team and initiate plan and interventions as ordered.  - [place] patient to unit/surroundings  - Explain treatment plan  - Encourage participation in care  - Encourage verbalization of concerns/fears  - Identify coping mechanisms  - Assist in developing anxiety-reducing skills  - Administer/offer alternative therapies  - Limit or eliminate stimulants  Outcome: Progressing

## 2024-12-23 NOTE — PROGRESS NOTES
12/23/24 0900 12/23/24 1000 12/23/24 1100   Activity/Group Checklist   Group Exercise Community meeting Other (Comment)  (Mindfulness)   Attendance Attended Did not attend Attended;Other (Comment)  (late)   Attendance Duration (min) 46-60  --  31-45   Interactions Interacted appropriately  --  Did not interact   Affect/Mood Appropriate  --  Calm   Goals Achieved Able to listen to others;Able to engage in interactions  --  Identified feelings;Identified triggers;Discussed coping strategies;Discussed self-esteem issues;Able to listen to others

## 2024-12-23 NOTE — PROGRESS NOTES
Progress Note - Behavioral Health   Name: Meli Nowak 59 y.o. female I MRN: [id number]  Unit/Bed#: [facility] 651-02 I Date of Admission: 7/23/2024   Date of Service: 12/23/2024 I Hospital Day: 153     Assessment & Plan  Schizoaffective disorder, bipolar type (HCC)  No medication changes today  Depakote to  500 mg qhs (12/12/24) for mood stabilization   VPA level 12/15-19  Most recent VPA level 54 on 10/12/24  CMP on 10/12/24 reviewed, WNL  Clozaril 900 mg nightly for schizoaffective disorder  Clozaril monitoring:   Weekly ANC on Mondays  ANC: 3.08 (12/23/24)  Most recent ECG reviewed 12/15/24 - normal ECG, NSR  Clozaril increased to 900 mg nightly on 10/29/24 - Clozaril level 1053 on 12/19/24 - will continue to monitor the level  Risperdal discontinued on 10/28/24 for moderate affectivity  Continue Klonopin 0.5 mg daily/ 1 mg qhs    Continue melatonin 3 mg nightly for interrupted sleep  Continue prazosin 5 mg nightly for PTSD associated nightmares; doses to be adjusted as indicated   Continue trazodone 50 mg nightly for insomnia  Discharge disposition: D/C 1/6 with ICM to home with follow up services  ASHLIE (obstructive sleep apnea)  - f/u w/ Sleep Medicine in outpatient setting and consider CPAP as indicated      Plan   Progress Toward Goals:   Meli is progressing towards goals of inpatient psychiatric treatment by continued medication compliance and is attending therapeutic modalities on the milieu. However, the patient continues to require inpatient psychiatric hospitalization for continued medication management and titration to optimize symptom reduction, improve sleep hygiene, and demonstrate adequate self-care.    Recommended Treatment: Treatment plan and medication changes discussed and per the attending physician the plan is:    1.Continue with group therapy, milieu therapy and occupational therapy  2.Behavioral Health checks every 7 minutes  3.Continue frequent safety checks and vitals per unit  protocol  4.Continue with SLIM medical management as indicated  5.Continue with current medication regimen  6.Will review labs in the a.m.  7.Disposition Planning: Discharge planning and efforts remain ongoing    Behavioral Health Medications: all current active meds have been reviewed and continue current psychiatric medications.  Current Facility-Administered Medications   Medication Dose Route Frequency Provider Last Rate    acetaminophen  650 mg Oral Q4H PRN Marie Ziegler, CRNP      acetaminophen  650 mg Oral Q4H PRN Marie Ziegler, CRNP      acetaminophen  975 mg Oral Q6H PRN Marie Ziegler, CRNP      albuterol  2 puff Inhalation Q4H PRN Kristen Logan, CRNP      aluminum-magnesium hydroxide-simethicone  30 mL Oral Q4H PRN Parris Bolanos, CRNP      Artificial Tears  1 drop Both Eyes Q6H PRN Dereje Banuelos MD      atorvastatin  10 mg Oral Daily Marie Ziegler, CRNP      bisacodyl  10 mg Oral Daily PRN Kristen Logan, CRNP      bisacodyl  10 mg Rectal Daily PRN Marie Ziegler, CRNP      carvedilol  6.25 mg Oral BID With Meals Marie Ziegler, CRNP      clonazePAM  0.5 mg Oral Daily Marie Ziegler, CRNP      clonazePAM  1 mg Oral HS Marie Ziegler, CRNP      cloZAPine  900 mg Oral HS Marie Ziegler, CRNP      cyanocobalamin  1,000 mcg Oral Daily Marie Ziegler, CRNP      Diclofenac Sodium  2 g Topical 4x Daily PRN Marie Ziegler, CRNP      famotidine  20 mg Oral BID Shan Fitzgerald DO      fluticasone  1 puff Inhalation Daily Marie Ziegler, CRNP      hydrOXYzine HCL  25 mg Oral Q6H PRN Max 4/day Marie Ziegler, CRNP      hydrOXYzine HCL  50 mg Oral Q6H PRN Max 4/day Marie Ziegler, CRNP      LORazepam  1 mg Intramuscular Q6H PRN Max 3/day Marie Ziegler, CRNP      LORazepam  1 mg Oral Q6H PRN Max 3/day Marie Ziegler, CRNP      melatonin  3 mg Oral HS Marie Ziegler, CRNP      mirtazapine  7.5 mg Oral HS PRN Marie Ziegler, CRNP      Multivitamin  15 mL Oral Daily Marie  JOSE ELIAS Ziegler      OLANZapine  2.5 mg Oral Q6H PRN Dereje Banuelos MD      Or    OLANZapine  2.5 mg Intramuscular Q6H PRN Dereje Banueols MD      OLANZapine  5 mg Oral Q6H PRN Dereje Banuelos MD      Or    OLANZapine  5 mg Intramuscular Q6H PRN Dereje Banuelos MD      ondansetron  4 mg Oral Q6H PRN Darin Lawton MD      polyethylene glycol  17 g Oral Daily JOSE ELIAS Ortega      polyethylene glycol  17 g Oral Daily PRN JOSE ELIAS Figueroa      prazosin  5 mg Oral HS JOSE ELIAS Figueroa      senna-docusate sodium  2 tablet Oral HS Rehana Borrego PA-C      traZODone  50 mg Oral HS JOSE ELIAS Figueroa      valproic acid  500 mg Oral HS JOSE ELIAS Figueroa         Risks / Benefits of Treatment:  Risks, benefits, and possible side effects of medications explained to patient and patient verbalizes understanding and agreement for treatment.       Subjective    Patient was seen today for continuation of care, records reviewed and patient was discussed with the morning case review team.    Meli was seen today for psychiatric follow-up.  On assessment today, Meli was pleasant.  Currently seen lying in bed, Meli does state that she went to group this morning and plans on going this afternoon.  Less anxious, patient  reports that she is looking forward to discharge and is slowly preparing.  Auditory hallucinations not as bothersome. Weekly labs obtained and remain WNL. Meli denies acute suicidal/self-harm ideation/intent/plan upon direct inquiry at this time.  Meli remains behaviorally appropriate, no agitation or aggression noted on exam or in report.   Impulse control is intact.  Meli remains adherent to her current psychotropic medication regimen and denies any side effects from medications, as well as none noted on exam.    Meli is stable and ready for discharge, however, even at baseline, Meli continues with poor insight, judgement and coping skills that pose a risk to patient in a homeless  shelter, or otherwise unsupervised setting. Meli is likely to decompensate rapidly, thus, becoming a risk of danger to self/others. Case management is assertively/actively working on securing the necessary level of care     Psychiatric Review of Systems:  Behavior over the last 24 hours:  unchanged.   Sleep: good  Appetite: good  Medication side effects: none  ROS: no complaints, denies shortness of breath or chest pain and all other systems are negative for acute changes        Objective    Vitals:  Vitals:    12/23/24 0744   BP: 136/83   Pulse: 88   Resp: 17   Temp: 97.8 °F (36.6 °C)   SpO2: 98%       Laboratory Results:  I have personally reviewed all pertinent laboratory/tests results.  Most Recent Labs:   Lab Results   Component Value Date    WBC 7.73 12/23/2024    RBC 4.07 12/23/2024    HGB 13.0 12/23/2024    HCT 39.8 12/23/2024     12/23/2024    RDW 13.2 12/23/2024    NEUTROABS 3.08 12/23/2024    SODIUM 141 12/15/2024    K 3.9 12/15/2024     12/15/2024    CO2 28 12/15/2024    BUN 17 12/15/2024    CREATININE 1.14 12/15/2024    GLUC 138 12/15/2024    GLUF 98 08/10/2024    CALCIUM 9.1 12/15/2024    AST 15 12/15/2024    ALT 14 12/15/2024    ALKPHOS 83 12/15/2024    TP 6.6 12/15/2024    ALB 4.0 12/15/2024    TBILI 0.26 12/15/2024    VALPROICTOT 19 (L) 12/15/2024    AMMONIA 32 07/03/2024    [id number] 2.000 07/24/2024    HGBA1C 6.4 (H) 05/03/2024     05/03/2024       Mental Status Evaluation:  Appearance:  disheveled, marginal hygiene   Behavior:  cooperative, calm, bizarre   Speech:  normal rate and volume   Mood:  less anxious, less depressed   Affect:  constricted   Thought Process:  perseverative   Thought Content:  somatic preoccupation, paranoid ideation   Perceptual Disturbances: auditory hallucinations still present, but less frequent   Risk Potential: Suicidal ideation - None  Homicidal ideation - None  Potential for aggression - No   Memory:  recent and remote memory grossly intact    Sensorium  person, place, and time/date      Consciousness:  alert and awake   Attention: attention span and concentration are age appropriate   Insight:  limited   Judgment: limited   Gait/Station: normal gait/station   Motor Activity: no abnormal movements       Counseling / Coordination of Care:  Patient's progress reviewed with nursing staff.  Medications, treatment progress and treatment plan reviewed with patient.  Supportive counseling provided to the patient.    This note was completed in part utilizing Dragon dictation Software. Grammatical, translation, syntax errors, random word insertions, spelling mistakes, and incomplete sentences may be an occasional consequence of this system secondary to software limitations with voice recognition, ambient noise, and hardware issues. If you have any questions or concerns about the content, text, or information contained within the body of this dictation, please contact the provider for clarification

## 2024-12-23 NOTE — CASE MANAGEMENT
CM rec'd email from Conchita Hall stating plans to visit pt this afternoon and reviewing plan for pt's d/c on 12/30. CM reviewed discussion with Dr Banuelos for pt's d/c on 1/2/25.  Conchita responded with email; regarding pt's need to d/c on 12/30 due to pt approaching 6 months and would lose apt. CM to review change in d/c date with provider.    Call made to Merari at [facility], tel# [phone]; [facility] requesting c/b to schedule tour and intake for pt.    Call made to Cyndie Grigsby at [facility], tel# [phone] ext [phone], [facility] notifying of anticipated d/c date and requested c/b.

## 2024-12-23 NOTE — BH TRANSITION RECORD
Contact Information: If you have any questions, concerns, pended studies, tests and/or procedures, or emergencies regarding your inpatient behavioral health visit. Please contact [facility] [phone] and ask to speak to a , nurse or physician. A contact is available 24 hours/ 7 days a week at this number.     Summary of Procedures Performed During your Stay:  Below is a list of major procedures performed during your hospital stay and a summary of results:  - Cardiac Procedures/Studies: ECG- Normal Sinus Rhythm.    Pending Studies (From admission, onward)       Start     Ordered    07/29/24 0600  CBC and differential  Every Monday      Start Status   12/30/24 0600 Scheduled   01/06/25 0600 Scheduled   01/13/25 0600 Scheduled       07/29/24 0912    07/29/24 0600  C-reactive protein  Every Monday      Start Status   12/30/24 0600 Scheduled   01/06/25 0600 Scheduled   01/13/25 0600 Scheduled       07/29/24 0912    07/29/24 0600  CK  Every Monday      Start Status   12/30/24 0600 Scheduled   01/06/25 0600 Scheduled   01/13/25 0600 Scheduled       07/29/24 0912                  Please follow up on the above pending studies with your PCP and/or referring provider.

## 2024-12-23 NOTE — TREATMENT TEAM
12/23/24 0800   Team Meeting   Meeting Type Daily Rounds   Team Members Present   Team Members Present Physician;Nurse;;;Occupational Therapist   Physician Team Member Dr. Banuelos / Dr. Hurst / Dr. Bland / JAIR Ziegler   Nursing Team Member David/Keith   Care Management Team Member Manju / Tee   Social Work Team Member Anastacio   OT Team Member Eduardo   Patient/Family Present   Patient Present No   Patient's Family Present No     Pt reported to be liable during the weekend and wishes to be home for the holidays with her cat. Pt reported to be withdrawn. Pt discharge pending community resources to return home.

## 2024-12-23 NOTE — CASE MANAGEMENT
Pt's ICM worker Conchita Hall present on unit for visit with pt. Conchita reports pt's friend will be cleaning pt's apt this Friday. Conchita reports having had pt's AC/Heat unit replaced due to having mold in system. Conchita requesting d/c meds sent to [facility] pharmacy for pt's meds as soon as possible to be daily dose packaged.     CM informed provider of Conchita's request for pt's d/c on 12/30 and d/c meds to be sent to [facility] pharmacy.    Aidin referral sent to 11 visiting nurse agencies with request for skilled nursing for weekly Clozaril lab work.

## 2024-12-23 NOTE — NURSING NOTE
Pt pleasant and med-compliant with even affect.  VSS.  Refused breakfast, good appetite at lunch.  Attended group.  Denied AH, no RIS noted.  Monitored for safety and support.

## 2024-12-23 NOTE — NURSING NOTE
Patient is calm and cooperative with care. Patient denies all psych s/s. Medication compliant. Patient is withdrawn to room during shift. Patient encouraged to make needs known. Safety checks ongoing.

## 2024-12-24 PROCEDURE — 99232 SBSQ HOSP IP/OBS MODERATE 35: CPT | Performed by: STUDENT IN AN ORGANIZED HEALTH CARE EDUCATION/TRAINING PROGRAM

## 2024-12-24 RX ADMIN — CARVEDILOL 6.25 MG: 6.25 TABLET, FILM COATED ORAL at 08:32

## 2024-12-24 RX ADMIN — Medication 15 ML: at 08:33

## 2024-12-24 RX ADMIN — FLUTICASONE FUROATE 1 PUFF: 100 POWDER RESPIRATORY (INHALATION) at 08:31

## 2024-12-24 RX ADMIN — CLOZAPINE 900 MG: 200 TABLET ORAL at 21:26

## 2024-12-24 RX ADMIN — FAMOTIDINE 20 MG: 20 TABLET ORAL at 08:32

## 2024-12-24 RX ADMIN — CYANOCOBALAMIN TAB 1000 MCG 1000 MCG: 1000 TAB at 08:32

## 2024-12-24 RX ADMIN — POLYETHYLENE GLYCOL 3350 17 G: 17 POWDER, FOR SOLUTION ORAL at 08:31

## 2024-12-24 RX ADMIN — ATORVASTATIN CALCIUM 10 MG: 10 TABLET, FILM COATED ORAL at 08:32

## 2024-12-24 RX ADMIN — MELATONIN TAB 3 MG 3 MG: 3 TAB at 21:26

## 2024-12-24 RX ADMIN — TRAZODONE HYDROCHLORIDE 50 MG: 50 TABLET ORAL at 21:26

## 2024-12-24 RX ADMIN — FAMOTIDINE 20 MG: 20 TABLET ORAL at 17:00

## 2024-12-24 RX ADMIN — SENNOSIDES AND DOCUSATE SODIUM 2 TABLET: 8.6; 5 TABLET ORAL at 21:26

## 2024-12-24 RX ADMIN — CLONAZEPAM 0.5 MG: 0.5 TABLET ORAL at 08:32

## 2024-12-24 RX ADMIN — VALPROIC ACID 500 MG: 500 SOLUTION ORAL at 21:26

## 2024-12-24 RX ADMIN — CLONAZEPAM 1 MG: 1 TABLET ORAL at 21:26

## 2024-12-24 RX ADMIN — CARVEDILOL 6.25 MG: 6.25 TABLET, FILM COATED ORAL at 16:43

## 2024-12-24 RX ADMIN — PRAZOSIN HYDROCHLORIDE 5 MG: 5 CAPSULE ORAL at 21:26

## 2024-12-24 NOTE — NURSING NOTE
Pt is calm and cooperative with care. Pt is visible on the unit this shift . Pt takes all medications as scheduled and has good intake at meals time. Pt at this time denies all psych s/s . Will maintain q 15 mins checks.

## 2024-12-24 NOTE — PROGRESS NOTES
Progress Note - Behavioral Health   Name: Meli Nowak 59 y.o. female I MRN: [id number]  Unit/Bed#: [facility] 6[facility]-02 I Date of Admission: 7/23/2024   Date of Service: 12/24/2024 I Hospital Day: 154     Assessment & Plan  Schizoaffective disorder, bipolar type (HCC)  No medication changes today  Depakote to  500 mg qhs (12/12/24) for mood stabilization   VPA level 12/15-19  Most recent VPA level 54 on 10/12/24  CMP on 10/12/24 reviewed, WNL  Clozaril 900 mg nightly for schizoaffective disorder  Clozaril monitoring:   Weekly ANC on Mondays  ANC: 3.08 (12/23/24)  Most recent ECG reviewed 12/15/24 - normal ECG, NSR  Clozaril increased to 900 mg nightly on 10/29/24 - Clozaril level 1053 on 12/19/24 - will continue to monitor the level  Risperdal discontinued on 10/28/24 for moderate affectivity  Continue Klonopin 0.5 mg daily/ 1 mg qhs    Continue melatonin 3 mg nightly for interrupted sleep  Continue prazosin 5 mg nightly for PTSD associated nightmares; doses to be adjusted as indicated   Continue trazodone 50 mg nightly for insomnia  Discharge disposition: D/C 12/30 with ICM to home with follow up services  ASHLIE (obstructive sleep apnea)  - f/u w/ Sleep Medicine in outpatient setting and consider CPAP as indicated      Plan   Progress Toward Goals:   Meli is progressing towards goals of inpatient psychiatric treatment by continued medication compliance and is attending therapeutic modalities on the milieu. However, the patient continues to require inpatient psychiatric hospitalization for continued medication management and titration to optimize symptom reduction, improve sleep hygiene, and demonstrate adequate self-care.    Recommended Treatment: Treatment plan and medication changes discussed and per the attending physician the plan is:    1.Continue with group therapy, milieu therapy and occupational therapy  2.Behavioral Health checks every 7 minutes  3.Continue frequent safety checks and vitals per unit  protocol  4.Continue with SLIM medical management as indicated  5.Continue with current medication regimen  6.Will review labs in the a.m.  7.Disposition Planning: Discharge planning and efforts remain ongoing    Behavioral Health Medications: all current active meds have been reviewed and continue current psychiatric medications.  Current Facility-Administered Medications   Medication Dose Route Frequency Provider Last Rate    acetaminophen  650 mg Oral Q4H PRN Marie Ziegler, CRNP      acetaminophen  650 mg Oral Q4H PRN Marie Ziegler, CRNP      acetaminophen  975 mg Oral Q6H PRN Marie Ziegler, CRNP      albuterol  2 puff Inhalation Q4H PRN Kristen Logan, CRNP      aluminum-magnesium hydroxide-simethicone  30 mL Oral Q4H PRN Parris Bolanos, CRNP      Artificial Tears  1 drop Both Eyes Q6H PRN Dereje Banuelos MD      atorvastatin  10 mg Oral Daily Marie Ziegler, CRNP      bisacodyl  10 mg Oral Daily PRN Kristen Logan, CRNP      bisacodyl  10 mg Rectal Daily PRN Marie Ziegler, CRNP      carvedilol  6.25 mg Oral BID With Meals Marie Ziegler, CRNP      clonazePAM  0.5 mg Oral Daily Marie Ziegler, CRNP      clonazePAM  1 mg Oral HS Marie Ziegler, CRNP      cloZAPine  900 mg Oral HS Marie Ziegler, CRNP      cyanocobalamin  1,000 mcg Oral Daily Marie Ziegler, CRNP      Diclofenac Sodium  2 g Topical 4x Daily PRN Marie Ziegler, CRNP      famotidine  20 mg Oral BID Shan Fitzgerald DO      fluticasone  1 puff Inhalation Daily Marie Ziegler, CRNP      hydrOXYzine HCL  25 mg Oral Q6H PRN Max 4/day Marie Ziegler, CRNP      hydrOXYzine HCL  50 mg Oral Q6H PRN Max 4/day Marie Ziegler, CRNP      LORazepam  1 mg Intramuscular Q6H PRN Max 3/day Marie Ziegler, CRNP      LORazepam  1 mg Oral Q6H PRN Max 3/day Marie Ziegler, CRNP      melatonin  3 mg Oral HS Marie Ziegler, CRNP      mirtazapine  7.5 mg Oral HS PRN Marie Ziegler, CRNP      Multivitamin  15 mL Oral Daily Marie  "Toney, JOSE ELIAS      OLANZapine  2.5 mg Oral Q6H PRN Dereje Banuelos MD      Or    OLANZapine  2.5 mg Intramuscular Q6H PRN Dereje Banuelos MD      OLANZapine  5 mg Oral Q6H PRN Dereje Banuelos MD      Or    OLANZapine  5 mg Intramuscular Q6H PRN Dereje Banuelos MD      ondansetron  4 mg Oral Q6H PRN Darin Lawton MD      polyethylene glycol  17 g Oral Daily JOSE ELIAS Ortega      polyethylene glycol  17 g Oral Daily PRN JOSE ELIAS Figueroa      prazosin  5 mg Oral HS JOSE ELIAS Figueroa      senna-docusate sodium  2 tablet Oral HS Rehana Borrego PA-C      traZODone  50 mg Oral HS JOSE ELIAS Figueroa      valproic acid  500 mg Oral HS JOSE ELIAS Figueroa         Risks / Benefits of Treatment:  Risks, benefits, and possible side effects of medications explained to patient and patient verbalizes understanding and agreement for treatment.       Subjective    Patient was seen today for continuation of care, records reviewed and patient was discussed with the morning case review team.    Meli was seen today for psychiatric follow-up.  On assessment today, Meli was pleasant.  No current symptoms reported, patient states that voices are \"okay \".  Discharge with [facility] to be changed to 12/30/2024, patient reports that she is optimistic and looking forward to returning to her apartment.  Still bizarre at times, patient managing symptoms well and using coping skills  appropriately.  No paranoia or somatic preoccupation noted on exam. .   Meli denies acute suicidal/self-harm ideation/intent/plan upon direct inquiry at this time.  Meli remains behaviorally appropriate, no agitation or aggression noted on exam or in report.   No overt delusions or paranoia are verbalized. Impulse control is intact.  Meli remains adherent to her current psychotropic medication regimen and denies any side effects from medications, as well as none noted on exam.    Meli is stable and ready for discharge, however, even at " baseline, Meli continues with poor insight, judgement and coping skills that pose a risk to patient in a homeless shelter, or otherwise unsupervised setting. Meli is likely to decompensate rapidly, thus, becoming a risk of danger to self/others. Case management is assertively/actively working on securing the necessary level of care     Psychiatric Review of Systems:  Behavior over the last 24 hours:  unchanged.   Sleep: good  Appetite: good  Medication side effects: none  ROS: no complaints, denies shortness of breath or chest pain and all other systems are negative for acute changes        Objective    Vitals:  Vitals:    12/24/24 0747   BP: 160/62   Pulse: 70   Resp: 17   Temp: 98.4 °F (36.9 °C)   SpO2: 95%       Laboratory Results:  I have personally reviewed all pertinent laboratory/tests results.  Most Recent Labs:   Lab Results   Component Value Date    WBC 7.73 12/23/2024    RBC 4.07 12/23/2024    HGB 13.0 12/23/2024    HCT 39.8 12/23/2024     12/23/2024    RDW 13.2 12/23/2024    NEUTROABS 3.08 12/23/2024    SODIUM 141 12/15/2024    K 3.9 12/15/2024     12/15/2024    CO2 28 12/15/2024    BUN 17 12/15/2024    CREATININE 1.14 12/15/2024    GLUC 138 12/15/2024    GLUF 98 08/10/2024    CALCIUM 9.1 12/15/2024    AST 15 12/15/2024    ALT 14 12/15/2024    ALKPHOS 83 12/15/2024    TP 6.6 12/15/2024    ALB 4.0 12/15/2024    TBILI 0.26 12/15/2024    VALPROICTOT 19 (L) 12/15/2024    AMMONIA 32 07/03/2024    [id number] 2.000 07/24/2024    HGBA1C 6.4 (H) 05/03/2024     05/03/2024       Mental Status Evaluation:  Appearance:  disheveled, marginal hygiene   Behavior:  cooperative, calm, guarded   Speech:  normal rate and volume   Mood:  less anxious, less depressed   Affect:  slightly brighter   Thought Process:  perseverative   Thought Content:  no overt delusions   Perceptual Disturbances: auditory hallucinations still present, but less frequent   Risk Potential: Suicidal ideation - None  Homicidal  ideation - None  Potential for aggression - No   Memory:  recent and remote memory grossly intact   Sensorium  person, place, and time/date      Consciousness:  alert and awake   Attention: attention span and concentration are age appropriate   Insight:  limited   Judgment: limited   Gait/Station: normal gait/station   Motor Activity: no abnormal movements       Counseling / Coordination of Care:  Patient's progress reviewed with nursing staff.  Medications, treatment progress and treatment plan reviewed with patient.  Supportive counseling provided to the patient.    This note was completed in part utilizing Dragon dictation Software. Grammatical, translation, syntax errors, random word insertions, spelling mistakes, and incomplete sentences may be an occasional consequence of this system secondary to software limitations with voice recognition, ambient noise, and hardware issues. If you have any questions or concerns about the content, text, or information contained within the body of this dictation, please contact the provider for clarification

## 2024-12-24 NOTE — NURSING NOTE
Patient withdrawn to her room lying in bed quietly. Denies all psych s/s. No behaviors noted. No c/po pain. No needs expressed. Had 75% for dinner. Took her medications. Safety checks ongoing.

## 2024-12-24 NOTE — ASSESSMENT & PLAN NOTE
No medication changes today  Depakote to  500 mg qhs (12/12/24) for mood stabilization   VPA level 12/15-19  Most recent VPA level 54 on 10/12/24  CMP on 10/12/24 reviewed, WNL  Clozaril 900 mg nightly for schizoaffective disorder  Clozaril monitoring:   Weekly ANC on Mondays  ANC: 3.08 (12/23/24)  Most recent ECG reviewed 12/15/24 - normal ECG, NSR  Clozaril increased to 900 mg nightly on 10/29/24 - Clozaril level 1053 on 12/19/24 - will continue to monitor the level  Risperdal discontinued on 10/28/24 for moderate affectivity  Continue Klonopin 0.5 mg daily/ 1 mg qhs    Continue melatonin 3 mg nightly for interrupted sleep  Continue prazosin 5 mg nightly for PTSD associated nightmares; doses to be adjusted as indicated   Continue trazodone 50 mg nightly for insomnia  Discharge disposition: D/C 12/30 with ICM to home with follow up services

## 2024-12-24 NOTE — TREATMENT TEAM
12/24/24 0900   Team Meeting   Meeting Type Daily Rounds   Team Members Present   Team Members Present Physician;Nurse;;;Occupational Therapist   Physician Team Member Dr. Banuelos / Dr. Hurst / Dr. Bland / JAIR Ziegler   Nursing Team Member David/Keith   Care Management Team Member Manju / Tee   Social Work Team Member Anastacio   OT Team Member Eduardo   Patient/Family Present   Patient Present No   Patient's Family Present No     Pt reported to be on mouth checks, wants to return home, discharge for 12/30.

## 2024-12-24 NOTE — TREATMENT TEAM
12/24/24 0900 12/24/24 1000 12/24/24 1300   Activity/Group Checklist   Group Exercise Other (Comment)  (OPEN STUDIO Art Therapy/Social Group, Holiday Music) Other (Comment)  (Strengths, reflection and reminiscing)   Attendance Attended Attended Attended   Attendance Duration (min) 46-60 16-30  (90 min group) 46-60   Interactions Interacted appropriately Interacted appropriately Other (Comment)  (missing her cat)   Affect/Mood Appropriate Appropriate Appropriate   Goals Achieved Able to listen to others;Able to engage in interactions Able to listen to others;Able to engage in interactions Identified feelings;Identified triggers;Identified relapse prevention strategies;Able to engage in interactions;Able to listen to others;Able to manage/cope with feelings;Able to reflect/comment on own behavior;Verbalized increased hopefulness;Able to self-disclose;Able to recieve feedback

## 2024-12-24 NOTE — PLAN OF CARE
Problem: Risk for Self Injury/Neglect  Goal: Treatment Goal: Remain safe during length of stay, learn and adopt new coping skills, and be free of self-injurious ideation, impulses and acts at the time of discharge  12/24/2024 0227 by Martha Gomez RN  Outcome: Progressing  12/24/2024 0227 by Martha Gomez RN  Outcome: Progressing  Goal: Verbalize thoughts and feelings  Description: Interventions:  - Assess and re-assess patient's lethality and potential for self-injury  - Engage patient in 1:1 interactions, daily, for a minimum of 15 minutes  - Encourage patient to express feelings, fears, frustrations, hopes  - Establish rapport/trust with patient   12/24/2024 0227 by Martha Gomez RN  Outcome: Progressing  12/24/2024 0227 by Martha Gomez RN  Outcome: Progressing  Goal: Refrain from harming self  Description: Interventions:  - Monitor patient closely, per order  - Develop a trusting relationship  - Supervise medication ingestion, monitor effects and side effects   12/24/2024 0227 by Martha Gomez RN  Outcome: Progressing  12/24/2024 0227 by Martha Gomez RN  Outcome: Progressing  Goal: Attend and participate in unit activities, including therapeutic, recreational, and educational groups  Description: Interventions:  - Provide therapeutic and educational activities daily, encourage attendance and participation, and document same in the medical record  - Obtain collateral information, encourage visitation and family involvement in care   12/24/2024 0227 by Martha Gomez RN  Outcome: Progressing  12/24/2024 0227 by Martha Gomez RN  Outcome: Progressing  Goal: Recognize maladaptive responses and adopt new coping mechanisms  12/24/2024 0227 by Martha Gomez RN  Outcome: Progressing  12/24/2024 0227 by Martha Gomez RN  Outcome: Progressing     Problem: Potential for Falls  Goal: Patient will remain free of falls  Description: INTERVENTIONS:  - Educate patient on patient safety  including physical limitations  - Instruct patient to call for assistance with activity   - Consult OT/PT to assist with strengthening/mobility   - Keep Call bell within reach  - Keep bed low and locked with side rails adjusted as appropriate  - Keep care items and personal belongings within reach  - Initiate and maintain comfort rounds  - Offer Toileting every 2 Hours, in advance of need  - Initiate/Maintain bed and chair alarm  - Obtain necessary fall risk management equipment: walker, wheelchair  - Apply yellow socks and bracelet for high fall risk patients  - Patient moved to room near nurses station  12/24/2024 0227 by Martha Gomez, RN  Outcome: Progressing  12/24/2024 0227 by Martha Gomez, RN  Outcome: Progressing

## 2024-12-25 PROCEDURE — 99232 SBSQ HOSP IP/OBS MODERATE 35: CPT | Performed by: NURSE PRACTITIONER

## 2024-12-25 RX ADMIN — FAMOTIDINE 20 MG: 20 TABLET ORAL at 17:03

## 2024-12-25 RX ADMIN — PRAZOSIN HYDROCHLORIDE 5 MG: 5 CAPSULE ORAL at 21:12

## 2024-12-25 RX ADMIN — MELATONIN TAB 3 MG 3 MG: 3 TAB at 21:12

## 2024-12-25 RX ADMIN — CARVEDILOL 6.25 MG: 6.25 TABLET, FILM COATED ORAL at 15:50

## 2024-12-25 RX ADMIN — SENNOSIDES AND DOCUSATE SODIUM 2 TABLET: 8.6; 5 TABLET ORAL at 21:12

## 2024-12-25 RX ADMIN — CLONAZEPAM 0.5 MG: 0.5 TABLET ORAL at 08:05

## 2024-12-25 RX ADMIN — FLUTICASONE FUROATE 1 PUFF: 100 POWDER RESPIRATORY (INHALATION) at 08:07

## 2024-12-25 RX ADMIN — ATORVASTATIN CALCIUM 10 MG: 10 TABLET, FILM COATED ORAL at 08:05

## 2024-12-25 RX ADMIN — CYANOCOBALAMIN TAB 1000 MCG 1000 MCG: 1000 TAB at 08:05

## 2024-12-25 RX ADMIN — FAMOTIDINE 20 MG: 20 TABLET ORAL at 08:05

## 2024-12-25 RX ADMIN — ACETAMINOPHEN 975 MG: 325 TABLET, FILM COATED ORAL at 16:39

## 2024-12-25 RX ADMIN — VALPROIC ACID 500 MG: 500 SOLUTION ORAL at 21:12

## 2024-12-25 RX ADMIN — TRAZODONE HYDROCHLORIDE 50 MG: 50 TABLET ORAL at 21:12

## 2024-12-25 RX ADMIN — ACETAMINOPHEN 975 MG: 325 TABLET, FILM COATED ORAL at 09:55

## 2024-12-25 RX ADMIN — POLYETHYLENE GLYCOL 3350 17 G: 17 POWDER, FOR SOLUTION ORAL at 08:06

## 2024-12-25 RX ADMIN — CLOZAPINE 900 MG: 200 TABLET ORAL at 21:13

## 2024-12-25 RX ADMIN — Medication 2 G: at 17:16

## 2024-12-25 RX ADMIN — Medication 15 ML: at 08:05

## 2024-12-25 RX ADMIN — CLONAZEPAM 1 MG: 1 TABLET ORAL at 21:12

## 2024-12-25 NOTE — PLAN OF CARE
Problem: Prexisting or High Potential for Compromised Skin Integrity  Goal: Skin integrity is maintained or improved  Description: INTERVENTIONS:  - Identify patients at risk for skin breakdown  - Assess and monitor skin integrity  - Assess and monitor nutrition and hydration status  - Monitor labs   - Assess for incontinence   - Turn and reposition patient  - Assist with mobility/ambulation  - Relieve pressure over bony prominences  - Avoid friction and shearing  - Provide appropriate hygiene as needed including keeping skin clean and dry  - Evaluate need for skin moisturizer/barrier cream  - Collaborate with interdisciplinary team   - Patient/family teaching  - Consider wound care consult   Outcome: Progressing     Problem: Alteration in Thoughts and Perception  Goal: Treatment Goal: Gain control of psychotic behaviors/thinking, reduce/eliminate presenting symptoms and demonstrate improved reality functioning upon discharge  Outcome: Progressing  Goal: Verbalize thoughts and feelings  Description: Interventions:  - Promote a nonjudgmental and trusting relationship with the patient through active listening and therapeutic communication  - Assess patient's level of functioning, behavior and potential for risk  - Engage patient in 1 on 1 interactions  - Encourage patient to express fears, feelings, frustrations, and discuss symptoms    - [place] patient to reality, help patient recognize reality-based thinking   - Administer medications as ordered and assess for potential side effects  - Provide the patient education related to the signs and symptoms of the illness and desired effects of prescribed medications  Outcome: Progressing  Goal: Refrain from acting on delusional thinking/internal stimuli  Description: Interventions:  - Monitor patient closely, per order   - Utilize least restrictive measures   - Set reasonable limits, give positive feedback for acceptable   - Administer medications as ordered and monitor  of potential side effects  Outcome: Progressing  Goal: Agree to be compliant with medication regime, as prescribed and report medication side effects  Description: Interventions:  - Offer appropriate PRN medication and supervise ingestion; conduct AIMS, as needed   Outcome: Progressing  Goal: Attend and participate in unit activities, including therapeutic, recreational, and educational groups  Description: Interventions:  -Encourage Visitation and family involvement in care  Outcome: Progressing  Goal: Recognize dysfunctional thoughts, communicate reality-based thoughts at the time of discharge  Description: Interventions:  - Provide medication and psycho-education to assist patient in compliance and developing insight into his/her illness   Outcome: Progressing  Goal: Complete daily ADLs, including personal hygiene independently, as able  Description: Interventions:  - Observe, teach, and assist patient with ADLS  - Monitor and promote a balance of rest/activity, with adequate nutrition and elimination   Outcome: Progressing     Problem: Risk for Self Injury/Neglect  Goal: Treatment Goal: Remain safe during length of stay, learn and adopt new coping skills, and be free of self-injurious ideation, impulses and acts at the time of discharge  Outcome: Progressing  Goal: Verbalize thoughts and feelings  Description: Interventions:  - Assess and re-assess patient's lethality and potential for self-injury  - Engage patient in 1:1 interactions, daily, for a minimum of 15 minutes  - Encourage patient to express feelings, fears, frustrations, hopes  - Establish rapport/trust with patient   Outcome: Progressing  Goal: Refrain from harming self  Description: Interventions:  - Monitor patient closely, per order  - Develop a trusting relationship  - Supervise medication ingestion, monitor effects and side effects   Outcome: Progressing     Problem: Depression  Goal: Treatment Goal: Demonstrate behavioral control of depressive  symptoms, verbalize feelings of improved mood/affect, and adopt new coping skills prior to discharge  Outcome: Progressing  Goal: Verbalize thoughts and feelings  Description: Interventions:  - Assess and re-assess patient's level of risk   - Engage patient in 1:1 interactions, daily, for a minimum of 15 minutes   - Encourage patient to express feelings, fears, frustrations, hopes   Outcome: Progressing  Goal: Refrain from self-neglect  Outcome: Progressing     Problem: Anxiety  Goal: Anxiety is at manageable level  Description: Interventions:  - Assess and monitor patient's anxiety level.   - Monitor for signs and symptoms (heart palpitations, chest pain, shortness of breath, headaches, nausea, feeling jumpy, restlessness, irritable, apprehensive).   - Collaborate with interdisciplinary team and initiate plan and interventions as ordered.  - [place] patient to unit/surroundings  - Explain treatment plan  - Encourage participation in care  - Encourage verbalization of concerns/fears  - Identify coping mechanisms  - Assist in developing anxiety-reducing skills  - Administer/offer alternative therapies  - Limit or eliminate stimulants  Outcome: Progressing

## 2024-12-25 NOTE — NURSING NOTE
"Patient is medication compliant. Patient refused breakfast this morning choosing to sleep in instead. Patient reported she just wasn't hungry. Patient complaint of generalized \"all over pain\" and requested Tylenol for same.  PRN Tylenol given at 0955 with effectiveness noted and decrease in pain reported by patient. Patient denies symptoms of depression or anxiety. Patient is scheduled to be discharged on 12/30/24 back to her apartment with [facility] services already in place. Will continue to monitor patient for changes in mood or behavior.  "

## 2024-12-25 NOTE — PROGRESS NOTES
Progress Note - Behavioral Health     Meli ESPINOZA Nowak 59 y.o. female MRN: [id number]   Unit/Bed#: [facility] 651-02 Encounter: [id number]          Assessment & Plan  Schizoaffective disorder, bipolar type (HCC)  No medication changes today  Depakote to  500 mg qhs (12/12/24) for mood stabilization   VPA level 12/15-19  Most recent VPA level 54 on 10/12/24  CMP on 10/12/24 reviewed, WNL  Clozaril 900 mg nightly for schizoaffective disorder  Clozaril monitoring:   Weekly ANC on Mondays  ANC: 3.08 (12/23/24)  Most recent ECG reviewed 12/15/24 - normal ECG, NSR  Clozaril increased to 900 mg nightly on 10/29/24 - Clozaril level 1053 on 12/19/24 - will continue to monitor the level  Risperdal discontinued on 10/28/24 for moderate affectivity  Continue Klonopin 0.5 mg daily/ 1 mg qhs    Continue melatonin 3 mg nightly for interrupted sleep  Continue prazosin 5 mg nightly for PTSD associated nightmares; doses to be adjusted as indicated   Continue trazodone 50 mg nightly for insomnia  Discharge disposition: D/C 12/30 with ICM to home with follow up services  ASHLIE (obstructive sleep apnea)  - f/u w/ Sleep Medicine in outpatient setting and consider CPAP as indicated        Recommended Treatment:     All current active medications have been reviewed  Encourage group therapy, milieu therapy and occupational therapy  Behavioral Health checks for safety monitoring  Discharge planning    Current medications:  Current Facility-Administered Medications   Medication Dose Route Frequency Provider Last Rate    acetaminophen  650 mg Oral Q4H PRN JOSE ELIAS Figueroa      acetaminophen  650 mg Oral Q4H PRN JOSE ELIAS Figueroa      acetaminophen  975 mg Oral Q6H PRN JOSE ELIAS Figueroa      albuterol  2 puff Inhalation Q4H PRN JOSE ELIAS Ortega      aluminum-magnesium hydroxide-simethicone  30 mL Oral Q4H PRN JOSE ELIAS Puri      Artificial Tears  1 drop Both Eyes Q6H PRN Dereje Banuelos MD      atorvastatin  10 mg Oral  Daily Marie Ziegler, CRNP      bisacodyl  10 mg Oral Daily PRN Kristen Logan, CRNP      bisacodyl  10 mg Rectal Daily PRN Marie Ziegler, CRNP      carvedilol  6.25 mg Oral BID With Meals Marie Ziegler, CRNP      clonazePAM  0.5 mg Oral Daily Marie Ziegler, CRNP      clonazePAM  1 mg Oral HS Marie Ziegler, CRNP      cloZAPine  900 mg Oral HS Marie Ziegler, CRNP      cyanocobalamin  1,000 mcg Oral Daily Marie Ziegler, CRNP      Diclofenac Sodium  2 g Topical 4x Daily PRN Marie Ziegler, CRNP      famotidine  20 mg Oral BID Shan Fitzgerald, DO      fluticasone  1 puff Inhalation Daily Marie Ziegler, CRNP      hydrOXYzine HCL  25 mg Oral Q6H PRN Max 4/day Marie Ziegler, CRNP      hydrOXYzine HCL  50 mg Oral Q6H PRN Max 4/day Marie Ziegler, CRNP      LORazepam  1 mg Intramuscular Q6H PRN Max 3/day Marie Ziegler, CRNP      LORazepam  1 mg Oral Q6H PRN Max 3/day Marie Ziegler, CRNP      melatonin  3 mg Oral HS Marie Ziegler, CRNP      mirtazapine  7.5 mg Oral HS PRN Marie Ziegler, CRNP      Multivitamin  15 mL Oral Daily Marie Ziegler, CRNP      OLANZapine  2.5 mg Oral Q6H PRN Dereje Banuelos MD      Or    OLANZapine  2.5 mg Intramuscular Q6H PRN Dereje Banuelos MD      OLANZapine  5 mg Oral Q6H PRN Dereje Banuelos MD      Or    OLANZapine  5 mg Intramuscular Q6H PRN Dereje Banuelos MD      ondansetron  4 mg Oral Q6H PRN Darin Lawton MD      polyethylene glycol  17 g Oral Daily Kristen Logan, CRNP      polyethylene glycol  17 g Oral Daily PRN Marie Ziegler, CRNP      prazosin  5 mg Oral HS Marie Ziegler, CRNP      senna-docusate sodium  2 tablet Oral HS Rehana Borrego, YENNY      traZODone  50 mg Oral HS Marie Ziegler, CRNP      valproic acid  500 mg Oral HS Marie Ziegler, CRNP           Risks / Benefits of Treatment:    Risks, benefits, and possible side effects of medications explained to patient and patient verbalizes understanding and agreement for  "treatment.      Subjective:    Documentation, nursing notes, medication reconciliation, labs, and vitals reviewed. Patient was seen for continuing care and reviewed with treatment team. No acute events over the past 24 hours.  Per nursing report, Patient is medication compliant. Patient refused breakfast this morning choosing to sleep in instead. Patient reported she just wasn't hungry. Patient complaint of generalized \"all over pain\" and requested Tylenol for same.  PRN Tylenol given at 0955 with effectiveness noted and decrease in pain reported by patient. Patient denies symptoms of depression or anxiety.     No medication changes over the past 24 hours. Continues to tolerate current medications with no adverse effects.     On evaluation today, she is seen in her room and resting in bed.  Pleasant on approach.  Listening to [place] music.  Bright.  Currently denying any psychiatric symptoms.  States she plans to return to her apartment and her cat.  Anticipates discharge next week.    No suicidal ideation, plan, or intent. Denies perceptual disturbances and does not exhibit any symptoms of champ on evaluation.    Psychiatric ROS:  Behavior over the last 24 hours: unchanged  Sleep: slept off and on  Appetite: fair  Medication side effects: No   ROS: no complaints, all other systems are negative    Mental Status Evaluation:    Appearance:  age appropriate   Behavior:  pleasant, cooperative   Speech:  normal rate, normal volume   Mood:  improved   Affect:  brighter   Thought Process:  goal directed   Associations: intact associations   Thought Content:  no overt delusions   Perceptual Disturbances: denies auditory hallucinations when asked   Risk Potential: Suicidal ideation - None  Homicidal ideation - None  Potential for aggression - No   Sensorium:  oriented to person, place, and time/date   Memory:  recent and remote memory grossly intact   Consciousness:  alert and awake   Attention/Concentration: decreased " concentration and decreased attention span   Insight:  poor   Judgment: poor   Gait/Station: normal gait/station, normal balance   Motor Activity: no abnormal movements     Vital signs in last 24 hours:    Temp:  [97.6 °F (36.4 °C)-98.2 °F (36.8 °C)] 97.7 °F (36.5 °C)  HR:  [73-93] 77  BP: (104-139)/(56-62) 104/56  Resp:  [16-19] 19  SpO2:  [92 %-97 %] 92 %  O2 Device: None (Room air)    Laboratory results: I have personally reviewed all pertinent laboratory/tests results    Results from the past 24 hours: No results found for this or any previous visit (from the past 24 hours).    Suicide/Homicide Risk Assessment:    Risk of Harm to Self:   Nursing Suicide Risk Assessment Last 24 hours: C-SSRS Risk (Since Last Contact)  Calculated C-SSRS Risk Score (Since Last Contact): No Risk Indicated  Based on today's assessment, Meli presents the following risk of harm to self: minimal    Risk of Harm to Others:  Nursing Homicide Risk Assessment: Violence Risk to Others: Denies within past 6 months  Based on today's assessment, Meli presents the following risk of harm to others: minimal    The following interventions are recommended: Behavioral Health checks for safety monitoring, continued hospitalization on locked unit    Progress Toward Goals: continues to improve    Counseling / Coordination of Care:    Patient's progress reviewed with nursing staff.  Medications, treatment progress and treatment plan reviewed with patient.  Reassurance and supportive therapy provided.  Encouraged participation in milieu and group therapy on the unit.    JOSE ELIAS Barros 12/25/24    This note has been constructed using a voice recognition system. There may be translation, syntax, or grammatical errors. If you have any questions, please contact the dictating author.

## 2024-12-25 NOTE — NURSING NOTE
"Patient is cooperative with care. Upon assessment patient appeared paranoid asking this writer \" are these medications safe? Am I safe here?\" Therapeutic support and reassurance provided. Patient is withdrawn to room throughout shift. Patient encouraged to make needs known. Safety checks ongoing.  "

## 2024-12-25 NOTE — NURSING NOTE
"Patient complained of generalized \"all over\" pain and requested Tylenol for same. Patient given PRN Tylenol at 1639 with some decrease in pain reported post administration. Patient reported then that she still had some pain and requested her cream for same.  PRN Voltaren Gel given at 1716 with effectiveness noted and no further complaints of pain. Patient then went to bed without difficulty. Will continue to monitor patient status.  "

## 2024-12-26 PROCEDURE — 99232 SBSQ HOSP IP/OBS MODERATE 35: CPT | Performed by: STUDENT IN AN ORGANIZED HEALTH CARE EDUCATION/TRAINING PROGRAM

## 2024-12-26 RX ADMIN — TRAZODONE HYDROCHLORIDE 50 MG: 50 TABLET ORAL at 21:50

## 2024-12-26 RX ADMIN — CARVEDILOL 6.25 MG: 6.25 TABLET, FILM COATED ORAL at 08:22

## 2024-12-26 RX ADMIN — FLUTICASONE FUROATE 1 PUFF: 100 POWDER RESPIRATORY (INHALATION) at 08:23

## 2024-12-26 RX ADMIN — POLYETHYLENE GLYCOL 3350 17 G: 17 POWDER, FOR SOLUTION ORAL at 08:22

## 2024-12-26 RX ADMIN — CARVEDILOL 6.25 MG: 6.25 TABLET, FILM COATED ORAL at 17:01

## 2024-12-26 RX ADMIN — CYANOCOBALAMIN TAB 1000 MCG 1000 MCG: 1000 TAB at 08:22

## 2024-12-26 RX ADMIN — SENNOSIDES AND DOCUSATE SODIUM 2 TABLET: 8.6; 5 TABLET ORAL at 21:46

## 2024-12-26 RX ADMIN — PRAZOSIN HYDROCHLORIDE 5 MG: 5 CAPSULE ORAL at 21:46

## 2024-12-26 RX ADMIN — CLONAZEPAM 0.5 MG: 0.5 TABLET ORAL at 08:22

## 2024-12-26 RX ADMIN — CLONAZEPAM 1 MG: 1 TABLET ORAL at 21:46

## 2024-12-26 RX ADMIN — CLOZAPINE 900 MG: 200 TABLET ORAL at 21:46

## 2024-12-26 RX ADMIN — FAMOTIDINE 20 MG: 20 TABLET ORAL at 17:01

## 2024-12-26 RX ADMIN — Medication 15 ML: at 08:23

## 2024-12-26 RX ADMIN — MELATONIN TAB 3 MG 3 MG: 3 TAB at 21:46

## 2024-12-26 RX ADMIN — FAMOTIDINE 20 MG: 20 TABLET ORAL at 08:22

## 2024-12-26 RX ADMIN — VALPROIC ACID 500 MG: 500 SOLUTION ORAL at 21:47

## 2024-12-26 RX ADMIN — ATORVASTATIN CALCIUM 10 MG: 10 TABLET, FILM COATED ORAL at 08:22

## 2024-12-26 NOTE — NURSING NOTE
Patient is isolative and disheveled. Listening to radio in bedroom. Ate dinner w/ encouragement. Denies AH. Appears anxious and depressed.

## 2024-12-26 NOTE — PROGRESS NOTES
Progress Note - Behavioral Health   Name: Meli Nowak 59 y.o. female I MRN: [id number]  Unit/Bed#: [facility] 651-02 I Date of Admission: 7/23/2024   Date of Service: 12/26/2024 I Hospital Day: 156     Assessment & Plan  Schizoaffective disorder, bipolar type (HCC)  No medication changes today  Depakote to  500 mg qhs (12/12/24) for mood stabilization   VPA  level 12/15-19  Most recent VPA level 54 on 10/12/24  CMP on 10/12/24 reviewed, WNL  Clozaril 900 mg nightly for schizoaffective disorder  Clozaril monitoring:   Weekly ANC on Mondays  ANC: 3.08 (12/23/24)  Most recent ECG reviewed 12/15/24 - normal ECG, NSR  Clozaril increased to 900 mg nightly on 10/29/24 - Clozaril level 1053 on 12/19/24 - will continue to monitor the level  Risperdal discontinued on 10/28/24 for moderate affectivity  Continue Klonopin 0.5 mg daily/ 1 mg qhs    Continue melatonin 3 mg nightly for interrupted sleep  Continue prazosin 5 mg nightly for PTSD associated nightmares; doses to be adjusted as indicated   Continue trazodone 50 mg nightly for insomnia  Discharge disposition: D/C 12/30 with ICM to home with follow up services  ASHLIE (obstructive sleep apnea)  - f/u w/ Sleep Medicine in outpatient setting and consider CPAP as indicated      Plan   Progress Toward Goals:   Meli is progressing towards goals of inpatient psychiatric treatment by continued medication compliance and is attending therapeutic modalities on the milieu. However, the patient continues to require inpatient psychiatric hospitalization for continued medication management and titration to optimize symptom reduction, improve sleep hygiene, and demonstrate adequate self-care.    Recommended Treatment: Treatment plan and medication changes discussed and per the attending physician the plan is:    1.Continue with group therapy, milieu therapy and occupational therapy  2.Behavioral Health checks every 7 minutes  3.Continue frequent safety checks and vitals per unit  protocol  4.Continue with SLIM medical management as indicated  5.Continue with current medication regimen  6.Will review labs in the a.m.  7.Disposition Planning: Discharge planning and efforts remain ongoing    Behavioral Health Medications: all current active meds have been reviewed and continue current psychiatric medications.  Current Facility-Administered Medications   Medication Dose Route Frequency Provider Last Rate    acetaminophen  650 mg Oral Q4H PRN Marie Ziegler, CRNP      acetaminophen  650 mg Oral Q4H PRN Marie Ziegler, CRNP      acetaminophen  975 mg Oral Q6H PRN Marie Ziegler, CRNP      albuterol  2 puff Inhalation Q4H PRN Kristen Logan, CRNP      aluminum-magnesium hydroxide-simethicone  30 mL Oral Q4H PRN Parris Bolanos, CRNP      Artificial Tears  1 drop Both Eyes Q6H PRN Dereje Banuelos MD      atorvastatin  10 mg Oral Daily Marie Ziegler, CRNP      bisacodyl  10 mg Oral Daily PRN Kristen Logan, CRNP      bisacodyl  10 mg Rectal Daily PRN Marie Ziegler, CRNP      carvedilol  6.25 mg Oral BID With Meals Marie Ziegler, CRNP      clonazePAM  0.5 mg Oral Daily Marie Ziegler, CRNP      clonazePAM  1 mg Oral HS Marie Ziegler, CRNP      cloZAPine  900 mg Oral HS Marie Ziegler, CRNP      cyanocobalamin  1,000 mcg Oral Daily Marie Ziegler, CRNP      Diclofenac Sodium  2 g Topical 4x Daily PRN Marie Ziegler, CRNP      famotidine  20 mg Oral BID Shan Fitzgerald DO      fluticasone  1 puff Inhalation Daily Marie Ziegler, CRNP      hydrOXYzine HCL  25 mg Oral Q6H PRN Max 4/day Marie Ziegler, CRNP      hydrOXYzine HCL  50 mg Oral Q6H PRN Max 4/day Marie Ziegler, CRNP      LORazepam  1 mg Intramuscular Q6H PRN Max 3/day Marie Ziegler, CRNP      LORazepam  1 mg Oral Q6H PRN Max 3/day Marie Ziegler, CRNP      melatonin  3 mg Oral HS Marie Ziegler, CRNP      mirtazapine  7.5 mg Oral HS PRN Marie Ziegler, CRNP      Multivitamin  15 mL Oral Daily Marie  JOSE ELIAS Ziegler      OLANZapine  2.5 mg Oral Q6H PRN Dereje Banuelos MD      Or    OLANZapine  2.5 mg Intramuscular Q6H PRN Dereje Banuelos MD      OLANZapine  5 mg Oral Q6H PRN Dereje Banuelos MD      Or    OLANZapine  5 mg Intramuscular Q6H PRN Dereje Banuelos MD      ondansetron  4 mg Oral Q6H PRN Darin Lawton MD      polyethylene glycol  17 g Oral Daily JOSE ELIAS Ortega      polyethylene glycol  17 g Oral Daily PRN JOSE ELIAS Figueroa      prazosin  5 mg Oral HS JOSE ELIAS Figueroa      senna-docusate sodium  2 tablet Oral HS Rehana Borrego PA-C      traZODone  50 mg Oral HS JOSE ELIAS Figueroa      valproic acid  500 mg Oral HS JOSE ELIAS Figueroa         Risks / Benefits of Treatment:  Risks, benefits, and possible side effects of medications explained to patient and patient verbalizes understanding and agreement for treatment.       Subjective    Patient was seen today for continuation of care, records reviewed and patient was discussed with the morning case review team.    Meli was seen today for psychiatric follow-up.  On assessment today, Meli was pleasant and brighter on approach. Visible and social this morning, patient was able to complete relapse prevention plan and states that she will reach out to ICM or Therapist when having increased anxiety. Crisis and safety plan also reviewed, patient is educated on the importance of medication compliance and follow up as she will be discharged on Clozaril. Understanding verbalized. Patient remains optimistic about discharge and reports significant reduction in AH and Paranoia. Meli denies acute suicidal/self-harm ideation/intent/plan upon direct inquiry at this time.  Meli remains behaviorally appropriate, no agitation or aggression noted on exam or in report.  Impulse control is intact.  Meli remains adherent to her current psychotropic medication regimen and denies any side effects from medications, as well as none noted on  exam.    Due to Meli's persistent inability to independently manage medications, noncompliance with treatment in the past, and ongoing severe mental illness symptoms, Meli will require continued hospital stay until discharge with [facility] on 12/30    Psychiatric Review of Systems:  Behavior over the last 24 hours:  unchanged.   Sleep: good  Appetite: good  Medication side effects: none  ROS: no complaints, denies shortness of breath or chest pain and all other systems are negative for acute changes        Objective    Vitals:  Vitals:    12/26/24 0749   BP: 123/60   Pulse: 89   Resp: 17   Temp: 97.6 °F (36.4 °C)   SpO2: 95%       Laboratory Results:  I have personally reviewed all pertinent laboratory/tests results.  Most Recent Labs:   Lab Results   Component Value Date    WBC 7.73 12/23/2024    RBC 4.07 12/23/2024    HGB 13.0 12/23/2024    HCT 39.8 12/23/2024     12/23/2024    RDW 13.2 12/23/2024    NEUTROABS 3.08 12/23/2024    SODIUM 141 12/15/2024    K 3.9 12/15/2024     12/15/2024    CO2 28 12/15/2024    BUN 17 12/15/2024    CREATININE 1.14 12/15/2024    GLUC 138 12/15/2024    GLUF 98 08/10/2024    CALCIUM 9.1 12/15/2024    AST 15 12/15/2024    ALT 14 12/15/2024    ALKPHOS 83 12/15/2024    TP 6.6 12/15/2024    ALB 4.0 12/15/2024    TBILI 0.26 12/15/2024    VALPROICTOT 19 (L) 12/15/2024    AMMONIA 32 07/03/2024    [id number] 2.000 07/24/2024    HGBA1C 6.4 (H) 05/03/2024     05/03/2024       Mental Status Evaluation:  Appearance:  adequate grooming, disheveled   Behavior:  cooperative, calm, still at times bizarre   Speech:  normal rate and volume   Mood:  less anxious, less depressed, less irritable   Affect:  constricted, slightly brighter   Thought Process:  perseverative   Thought Content:  Anabaptism and somatic preoccupation, some paranoia   Perceptual Disturbances: auditory hallucinations still present, but less frequent, chronic, no commands   Risk Potential: Suicidal ideation -  None  Homicidal ideation - None  Potential for aggression - No   Memory:  recent and remote memory grossly intact   Sensorium  person, place, and time/date      Consciousness:  alert and awake   Attention: attention span and concentration are age appropriate   Insight:  limited   Judgment: limited   Gait/Station: normal gait/station   Motor Activity: no abnormal movements       Counseling / Coordination of Care:  Patient's progress reviewed with nursing staff.  Medications, treatment progress and treatment plan reviewed with patient.  Supportive counseling provided to the patient.    This note was completed in part utilizing Dragon dictation Software. Grammatical, translation, syntax errors, random word insertions, spelling mistakes, and incomplete sentences may be an occasional consequence of this system secondary to software limitations with voice recognition, ambient noise, and hardware issues. If you have any questions or concerns about the content, text, or information contained within the body of this dictation, please contact the provider for clarification

## 2024-12-26 NOTE — TREATMENT TEAM
12/26/24 0800   Team Meeting   Meeting Type Daily Rounds   Team Members Present   Team Members Present Physician;Nurse;;;Occupational Therapist   Physician Team Member Dr. Banuelos / Dr. Hurst / Dr. Bland / JAIR Ziegler   Nursing Team Member David / Keith   Care Management Team Member Tee / Manju   Social Work Team Member Anastacio   OT Team Member Eduardo   Patient/Family Present   Patient Present No   Patient's Family Present No     Treatment Team Rounds Completed  Medical and Psychiatric Review Completed  D/C: visible, cooperative, calm, discharging 12/30

## 2024-12-26 NOTE — NURSING NOTE
"Patient still had not put on clean sheets from this morning. Encouraged again. Patient obtained sheets but overheard in bedroom telling roommate \"usually if you don't make your bed staff will do it for you.\" Patient needs a lot of encouragement for ADLs.   "

## 2024-12-26 NOTE — TREATMENT TEAM
Pt completed admission Bh relapse prevention.  Pt signed and copy in chart.  Crisis, wamline and [phone] numbers provided.   Pt d/c scheduled 12/30.  [facility].  Provider Dr Abreu .  Pt attends groups.      12/26/24 0910   Activity/Group Checklist   Group Admission/Discharge   Attendance Attended   Attendance Duration (min) 16-30   Interactions Interacted appropriately   Affect/Mood Bright   Goals Achieved Identified feelings;Identified triggers;Identified relapse prevention strategies;Able to listen to others;Able to reflect/comment on own behavior;Able to engage in interactions;Able to manage/cope with feelings;Verbalized increased hopefulness;Able to self-disclose;Able to recieve feedback

## 2024-12-26 NOTE — NURSING NOTE
Patient had BM while laying in bed. Witnessed shower. Needed redirection to refrain from being in the room until bed was disinfected. Afterwards went into room and laid on bed w/o sheets. Encouraged to apply sheets but stated she was waiting for staff to do it. Patient continues to need strong encouragement for self care.

## 2024-12-26 NOTE — CASE MANAGEMENT
CM met with pt, reviewed plans for d/c on Monday. Pt immediately brightened and reports being excited for d/c. CM reviewed concerns that pt was found by nursing to be laying in stool. Pt reports reason for incontinence is due to pt drinking too much ginger ale. CM reviewed concerns for pt's ability to clean self and bed sheets if pt were to have another incontinence episode. Pt reports having laundry facility in apt building and pt reports knowing how to use facilities. Pt denies concerns for home d/c.    Call made to [facility] tel# [phone], [facility] requesting c/b.    Pt accepted by following home health agencies: [address] [place] [address], [facility], [facility]. Pt denies preference for agency.     Call made to [facility] Mary Ann Will in admissions tel# [phone], [facility] for admissions to contact CM.

## 2024-12-26 NOTE — TREATMENT TEAM
Pt attends groups.  Pt pleasant and cooperative  Pt  able to identify positive self talk.     12/23/24 1100 12/26/24 0900 12/26/24 1000   Activity/Group Checklist   Group  --  Exercise Community meeting   Attendance  --  Attended Attended   Attendance Duration (min)  --  16-30 46-60   Interactions Did not interact Interacted appropriately Interacted appropriately   Affect/Mood  --  Appropriate Bright   Goals Achieved  --  Able to listen to others;Able to engage in interactions Identified feelings;Identified triggers;Identified relapse prevention strategies;Increased hopefulness;Discussed coping strategies;Able to listen to others;Able to reflect/comment on own behavior;Able to manage/cope with feelings;Able to engage in interactions;Verbalized increased hopefulness;Able to self-disclose;Able to recieve feedback      12/26/24 1143 12/26/24 1330   Activity/Group Checklist   Group Nursing Education Other (Comment)  (Ant-anxiety calm coping skills: jazz, watercolor and expression)   Attendance Attended Attended   Attendance Duration (min) 31-45 46-60   Interactions Did not interact Interacted appropriately   Affect/Mood Appropriate Calm;Appropriate   Goals Achieved Able to listen to others Identified feelings;Identified triggers;Discussed coping strategies;Able to listen to others;Able to engage in interactions;Able to reflect/comment on own behavior;Able to manage/cope with feelings;Able to self-disclose;Verbalized increased hopefulness

## 2024-12-26 NOTE — NURSING NOTE
Patient is flat, disorganized, and suspicious. Visible intermittently and minimally social w/ peers. Appetite is adequate. Showered today but still appears disheveled. Medication compliant. Attending groups w/ encouragement. Fall precautions in place. Continuous rounding maintained.

## 2024-12-26 NOTE — NURSING NOTE
Patient is visible for snack but otherwise withdrawn to room this evening, however she is bright on approach and able to make needs known. Patient reports feeling happy, denies all psych s/s. Medication compliant and cooperative with care. Safety precautions maintained.

## 2024-12-27 PROCEDURE — 99232 SBSQ HOSP IP/OBS MODERATE 35: CPT | Performed by: STUDENT IN AN ORGANIZED HEALTH CARE EDUCATION/TRAINING PROGRAM

## 2024-12-27 RX ADMIN — CLOZAPINE 900 MG: 200 TABLET ORAL at 21:22

## 2024-12-27 RX ADMIN — FLUTICASONE FUROATE 1 PUFF: 100 POWDER RESPIRATORY (INHALATION) at 08:18

## 2024-12-27 RX ADMIN — TRAZODONE HYDROCHLORIDE 50 MG: 50 TABLET ORAL at 21:22

## 2024-12-27 RX ADMIN — ATORVASTATIN CALCIUM 10 MG: 10 TABLET, FILM COATED ORAL at 08:38

## 2024-12-27 RX ADMIN — CARVEDILOL 6.25 MG: 6.25 TABLET, FILM COATED ORAL at 17:04

## 2024-12-27 RX ADMIN — SENNOSIDES AND DOCUSATE SODIUM 2 TABLET: 8.6; 5 TABLET ORAL at 21:22

## 2024-12-27 RX ADMIN — Medication 15 ML: at 08:17

## 2024-12-27 RX ADMIN — CLONAZEPAM 0.5 MG: 0.5 TABLET ORAL at 08:17

## 2024-12-27 RX ADMIN — FAMOTIDINE 20 MG: 20 TABLET ORAL at 17:04

## 2024-12-27 RX ADMIN — CYANOCOBALAMIN TAB 1000 MCG 1000 MCG: 1000 TAB at 08:18

## 2024-12-27 RX ADMIN — FAMOTIDINE 20 MG: 20 TABLET ORAL at 08:17

## 2024-12-27 RX ADMIN — VALPROIC ACID 500 MG: 500 SOLUTION ORAL at 21:22

## 2024-12-27 RX ADMIN — POLYETHYLENE GLYCOL 3350 17 G: 17 POWDER, FOR SOLUTION ORAL at 08:17

## 2024-12-27 RX ADMIN — MELATONIN TAB 3 MG 3 MG: 3 TAB at 21:22

## 2024-12-27 RX ADMIN — CARVEDILOL 6.25 MG: 6.25 TABLET, FILM COATED ORAL at 08:17

## 2024-12-27 RX ADMIN — PRAZOSIN HYDROCHLORIDE 5 MG: 5 CAPSULE ORAL at 21:22

## 2024-12-27 RX ADMIN — CLONAZEPAM 1 MG: 1 TABLET ORAL at 21:22

## 2024-12-27 NOTE — PROGRESS NOTES
Progress Note - Behavioral Health   Name: Meli Nowak 59 y.o. female I MRN: [id number]  Unit/Bed#: [facility] 651-02 I Date of Admission: 7/23/2024   Date of Service: 12/27/2024 I Hospital Day: 157     Assessment & Plan  Schizoaffective disorder, bipolar type (HCC)  No medication changes today  Depakote to  500 mg qhs (12/12/24) for mood stabilization   VPA level 12/15-19  Most recent VPA level 54 on 10/12/24  CMP on 10/12/24 reviewed, WNL  Clozaril 900 mg nightly for schizoaffective disorder  Clozaril monitoring:   Weekly ANC on Mondays  ANC: 3.08 (12/23/24)  Most recent ECG reviewed 12/15/24 - normal ECG, NSR  Clozaril increased to 900 mg nightly on 10/29/24 - Clozaril level 1053 on 12/19/24 - will continue to monitor the level  Risperdal discontinued on 10/28/24 for moderate affectivity  Continue Klonopin 0.5 mg daily/ 1 mg qhs    Continue melatonin 3 mg nightly for interrupted sleep  Continue prazosin 5 mg nightly for PTSD associated nightmares; doses to be adjusted as indicated   Continue trazodone 50 mg nightly for insomnia  Discharge disposition: D/C 12/30 with ICM to home with follow up services  ASHLIE (obstructive sleep apnea)  - f/u w/ Sleep Medicine in outpatient setting and consider CPAP as indicated      Plan   Progress Toward Goals:   Meli is progressing towards goals of inpatient psychiatric treatment by continued medication compliance and is attending therapeutic modalities on the milieu. However, the patient continues to require inpatient psychiatric hospitalization for continued medication management and titration to optimize symptom reduction, improve sleep hygiene, and demonstrate adequate self-care.    Recommended Treatment: Treatment plan and medication changes discussed and per the attending physician the plan is:    1.Continue with group therapy, milieu therapy and occupational therapy  2.Behavioral Health checks every 7 minutes  3.Continue frequent safety checks and vitals per unit  protocol  4.Continue with SLIM medical management as indicated  5.Continue with current medication regimen  6.Will review labs in the a.m.  7.Disposition Planning: Discharge planning and efforts remain ongoing    Behavioral Health Medications: all current active meds have been reviewed and continue current psychiatric medications.  Current Facility-Administered Medications   Medication Dose Route Frequency Provider Last Rate    acetaminophen  650 mg Oral Q4H PRN Marie Ziegler, CRNP      acetaminophen  650 mg Oral Q4H PRN Marie Ziegler, CRNP      acetaminophen  975 mg Oral Q6H PRN Marie Ziegler, CRNP      albuterol  2 puff Inhalation Q4H PRN Kristen Logan, CRNP      aluminum-magnesium hydroxide-simethicone  30 mL Oral Q4H PRN Parris Bolanos, CRNP      Artificial Tears  1 drop Both Eyes Q6H PRN Dereje Banuelos MD      atorvastatin  10 mg Oral Daily Marie Ziegler, CRNP      bisacodyl  10 mg Oral Daily PRN Kristen Logan, CRNP      bisacodyl  10 mg Rectal Daily PRN Marie Ziegler, CRNP      carvedilol  6.25 mg Oral BID With Meals Marie Ziegler, CRNP      clonazePAM  0.5 mg Oral Daily Marie Ziegler, CRNP      clonazePAM  1 mg Oral HS Marie Ziegler, CRNP      cloZAPine  900 mg Oral HS Marie Ziegler, CRNP      cyanocobalamin  1,000 mcg Oral Daily Marie Ziegler, CRNP      Diclofenac Sodium  2 g Topical 4x Daily PRN Marie Ziegler, CRNP      famotidine  20 mg Oral BID Shan Fitzgerald DO      fluticasone  1 puff Inhalation Daily Marie Ziegler, CRNP      hydrOXYzine HCL  25 mg Oral Q6H PRN Max 4/day Marie Ziegler, CRNP      hydrOXYzine HCL  50 mg Oral Q6H PRN Max 4/day Marie Ziegler, CRNP      LORazepam  1 mg Intramuscular Q6H PRN Max 3/day Marie Ziegler, CRNP      LORazepam  1 mg Oral Q6H PRN Max 3/day Marie Ziegler, CRNP      melatonin  3 mg Oral HS Marie Ziegler, CRNP      mirtazapine  7.5 mg Oral HS PRN Marie Ziegler, CRNP      Multivitamin  15 mL Oral Daily Marie  JOSE ELIAS Ziegler      OLANZapine  2.5 mg Oral Q6H PRN Dereje Banuelos MD      Or    OLANZapine  2.5 mg Intramuscular Q6H PRN Dereje Banuelos MD      OLANZapine  5 mg Oral Q6H PRN Dereje Banuelos MD      Or    OLANZapine  5 mg Intramuscular Q6H PRN Dereje Banuelos MD      ondansetron  4 mg Oral Q6H PRN Darin Lawton MD      polyethylene glycol  17 g Oral Daily JOSE ELIAS Orteag      polyethylene glycol  17 g Oral Daily PRN JOSE ELIAS Figueroa      prazosin  5 mg Oral HS JOSE ELIAS Figueroa      senna-docusate sodium  2 tablet Oral HS Rehana Borrego PA-C      traZODone  50 mg Oral HS JOSE ELIAS Figueroa      valproic acid  500 mg Oral HS JOSE ELIAS Figueroa         Risks / Benefits of Treatment:  Risks, benefits, and possible side effects of medications explained to patient and patient verbalizes understanding and agreement for treatment.       Subjective    Patient was seen today for continuation of care, records reviewed and patient was discussed with the morning case review team.    Meli was seen today for psychiatric follow-up.  On assessment today, Meli was pleasant and sitting in the day room.  Endorsing intermittent paranoia, nursing reports that patient has been more suspicious of medications and meals.  Ongoing mouth checks observed, Meli also required prompting from multiple staff members for self-care as she was incontinent of both bowel and bladder, did not make staff aware and knowingly remained in bed.  Weekly labs to be obtained q. Monday, patient continues to be monitored for  Clozaril management.  Auditory hallucinations are less bothersome.  Meli denies acute suicidal/self-harm ideation/intent/plan upon direct inquiry at this time.  Meli remains behaviorally appropriate, no agitation or aggression noted on exam or in report. Impulse control is intact.  Meli remains adherent to her current psychotropic medication regimen and denies any side effects from medications, as well as  none noted on exam.    Meli is stable and ready for discharge, however, even at baseline, [place] continues with poor insight, judgement and coping skills that pose a risk to patient in a homeless shelter, or otherwise unsupervised setting. Meli is likely to decompensate rapidly, thus, becoming a risk of danger to self/others. Case management is assertively/actively working on securing the necessary level of care     Psychiatric Review of Systems:  Behavior over the last 24 hours:  unchanged.   Sleep: good  Appetite: good  Medication side effects: none  ROS: no complaints, denies shortness of breath or chest pain and all other systems are negative for acute changes        Objective    Vitals:  Vitals:    12/27/24 0742   BP: 115/68   Pulse: 76   Resp: 18   Temp: 98.9 °F (37.2 °C)   SpO2: 94%       Laboratory Results:  I have personally reviewed all pertinent laboratory/tests results.  Most Recent Labs:   Lab Results   Component Value Date    WBC 7.73 12/23/2024    RBC 4.07 12/23/2024    HGB 13.0 12/23/2024    HCT 39.8 12/23/2024     12/23/2024    RDW 13.2 12/23/2024    NEUTROABS 3.08 12/23/2024    SODIUM 141 12/15/2024    K 3.9 12/15/2024     12/15/2024    CO2 28 12/15/2024    BUN 17 12/15/2024    CREATININE 1.14 12/15/2024    GLUC 138 12/15/2024    GLUF 98 08/10/2024    CALCIUM 9.1 12/15/2024    AST 15 12/15/2024    ALT 14 12/15/2024    ALKPHOS 83 12/15/2024    TP 6.6 12/15/2024    ALB 4.0 12/15/2024    TBILI 0.26 12/15/2024    VALPROICTOT 19 (L) 12/15/2024    AMMONIA 32 07/03/2024    [id number] 2.000 07/24/2024    HGBA1C 6.4 (H) 05/03/2024     05/03/2024       Mental Status Evaluation:  Appearance:  disheveled, marginal hygiene   Behavior:  cooperative, calm, guarded   Speech:  normal rate and volume   Mood:  less anxious, less depressed   Affect:  constricted   Thought Process:  perseverative   Thought Content:  Anabaptist preoccupation, some paranoia   Perceptual Disturbances: auditory  hallucinations still present, but less frequent   Risk Potential: Suicidal ideation - None  Homicidal ideation - None  Potential for aggression - No   Memory:  recent and remote memory grossly intact   Sensorium  person, place, and time/date      Consciousness:  alert and awake   Attention: attention span and concentration are age appropriate   Insight:  limited   Judgment: limited   Gait/Station: normal gait/station   Motor Activity: no abnormal movements       Counseling / Coordination of Care:  Patient's progress reviewed with nursing staff.  Medications, treatment progress and treatment plan reviewed with patient.  Supportive counseling provided to the patient.    This note was completed in part utilizing Dragon dictation Software. Grammatical, translation, syntax errors, random word insertions, spelling mistakes, and incomplete sentences may be an occasional consequence of this system secondary to software limitations with voice recognition, ambient noise, and hardware issues. If you have any questions or concerns about the content, text, or information contained within the body of this dictation, please contact the provider for clarification

## 2024-12-27 NOTE — TREATMENT TEAM
Pt attended groups.  Pt able to self express and cooperative  Pt making slow and steady progress towards mh recovery goals.     12/27/24 0900 12/27/24 1000 12/27/24 1100   Activity/Group Checklist   Group Exercise Community meeting Self Esteem   Attendance Attended Attended Attended   Attendance Duration (min) 16-30 31-45 46-60   Interactions Interacted appropriately Interacted appropriately Interacted appropriately   Affect/Mood Appropriate Appropriate;Calm Calm   Goals Achieved Able to listen to others;Able to engage in interactions Identified feelings;Identified triggers;Identified relapse prevention strategies;Discussed coping strategies;Able to reflect/comment on own behavior;Able to engage in interactions;Able to listen to others;Displayed empathy;Verbalized increased hopefulness;Able to manage/cope with feelings;Able to self-disclose;Able to recieve feedback Identified feelings;Identified triggers;Displayed empathy;Discussed coping strategies;Discussed self-esteem issues;Able to listen to others;Able to reflect/comment on own behavior;Able to engage in interactions;Able to manage/cope with feelings;Verbalized increased hopefulness;Able to self-disclose;Able to recieve feedback      12/27/24 1330   Activity/Group Checklist   Group Other (Comment)  (Gratitude, reflection, music and expression)   Attendance Attended   Attendance Duration (min) 46-60   Interactions Interacted appropriately   Affect/Mood Appropriate   Goals Achieved Identified feelings;Discussed coping strategies;Able to listen to others;Able to engage in interactions;Able to reflect/comment on own behavior;Discussed self-esteem issues;Able to self-disclose;Able to recieve feedback;Able to manage/cope with feelings;Verbalized increased hopefulness

## 2024-12-27 NOTE — TREATMENT TEAM
12/27/24 0700   Team Meeting   Meeting Type Daily Rounds   Team Members Present   Team Members Present Physician;Nurse;;;Occupational Therapist   Physician Team Member Dr. Banuelos / Dr. Hurst / Dr. Bland / JAIR Ziegler   Nursing Team Member David / Keith   Care Management Team Member Tee / Manju   Social Work Team Member Anastacio   OT Team Member Eduardo   Patient/Family Present   Patient Present No   Patient's Family Present No     Treatment Team Rounds Completed  Medical and Psychiatric Review Completed  D/C: eating, some groups, withdrawn, guarded, disorganized, incontinent

## 2024-12-27 NOTE — CASE MANAGEMENT
is out today. LAURIE left VM on the  mailbox to schedule patient for [facility]. Left information for LAURIE Newby.       [facility]   [phone]

## 2024-12-27 NOTE — PLAN OF CARE
Problem: Alteration in Thoughts and Perception  Goal: Verbalize thoughts and feelings  Description: Interventions:  - Promote a nonjudgmental and trusting relationship with the patient through active listening and therapeutic communication  - Assess patient's level of functioning, behavior and potential for risk  - Engage patient in 1 on 1 interactions  - Encourage patient to express fears, feelings, frustrations, and discuss symptoms    - [place] patient to reality, help patient recognize reality-based thinking   - Administer medications as ordered and assess for potential side effects  - Provide the patient education related to the signs and symptoms of the illness and desired effects of prescribed medications  Outcome: Progressing     Problem: Ineffective Coping  Goal: Participates in unit activities  Description: Interventions:  - Provide therapeutic environment   - Provide required programming   - Redirect inappropriate behaviors   Outcome: Progressing     Problem: Potential for Falls  Goal: Patient will remain free of falls  Description: INTERVENTIONS:  - Educate patient on patient safety including physical limitations  - Instruct patient to call for assistance with activity   - Consult OT/PT to assist with strengthening/mobility   - Keep Call bell within reach  - Keep bed low and locked with side rails adjusted as appropriate  - Keep care items and personal belongings within reach  - Initiate and maintain comfort rounds  - Offer Toileting every 2 Hours, in advance of need  - Initiate/Maintain bed and chair alarm  - Obtain necessary fall risk management equipment: walker, wheelchair  - Apply yellow socks and bracelet for high fall risk patients  - Patient moved to room near nurses station  Outcome: Progressing

## 2024-12-27 NOTE — CASE MANAGEMENT
Call made to [facility] requesting c/b to schedule appt.     Email sent to Cyndie Grigsby at [facility] notifying of pt's d/c on Monday and scheduling start of [facility] services.    Call made to Merari at [facility] notifying of d/c date and requesting c/b    CM sent email to Merari lema [facility], [email or web address]  Requesting contact to schedule pt's start of services for psych rehab programming.

## 2024-12-27 NOTE — CASE MANAGEMENT
Aidin message sent to [facility] notifying of pt's d/c Monday and request for confirmation of start of care date; CM also notified of pt's need for weekly lab draws every Monday starting 1/6.

## 2024-12-27 NOTE — CASE MANAGEMENT
Dr Banuelos requesting conference call with CM and pt's ICM worker Conchita. Call made to [facility] [place] requesting c/b.    CM sent Conchita email requesting call today by noon to have conference call with Dr Banuelos

## 2024-12-27 NOTE — NURSING NOTE
Patient was withdrawn to her room but came out for snack. Denies all psych s/s. No behaviors noted. No c/o pain. No needs expressed. Took her HS medications. Safety checks ongoing.

## 2024-12-27 NOTE — NURSING NOTE
"Patient asked this writer during breakfast \"Can I ask you a big question? Is my food safe?\" When reassured that her food was safe she asked \"Are you sure?\" Patient continuously provided support and reassurance by staff. Reassured that her food, drinks, and medications are safe and not poisoned or \"street drugs\" as she believes.   "

## 2024-12-27 NOTE — NURSING NOTE
Pt paranoid and suspicious of food and medication. Reassured her that everything is safe. Patient attended groups today. Medication and meal compliant after encouragement. Denies SI/HI. Q 15  minute checks maintained.

## 2024-12-28 PROCEDURE — 99232 SBSQ HOSP IP/OBS MODERATE 35: CPT | Performed by: STUDENT IN AN ORGANIZED HEALTH CARE EDUCATION/TRAINING PROGRAM

## 2024-12-28 RX ADMIN — MELATONIN TAB 3 MG 3 MG: 3 TAB at 21:30

## 2024-12-28 RX ADMIN — CLONAZEPAM 1 MG: 1 TABLET ORAL at 21:30

## 2024-12-28 RX ADMIN — TRAZODONE HYDROCHLORIDE 50 MG: 50 TABLET ORAL at 21:30

## 2024-12-28 RX ADMIN — FLUTICASONE FUROATE 1 PUFF: 100 POWDER RESPIRATORY (INHALATION) at 08:00

## 2024-12-28 RX ADMIN — CYANOCOBALAMIN TAB 1000 MCG 1000 MCG: 1000 TAB at 08:03

## 2024-12-28 RX ADMIN — Medication 15 ML: at 08:00

## 2024-12-28 RX ADMIN — ACETAMINOPHEN 975 MG: 325 TABLET, FILM COATED ORAL at 07:58

## 2024-12-28 RX ADMIN — SENNOSIDES AND DOCUSATE SODIUM 2 TABLET: 8.6; 5 TABLET ORAL at 21:30

## 2024-12-28 RX ADMIN — CARVEDILOL 6.25 MG: 6.25 TABLET, FILM COATED ORAL at 07:59

## 2024-12-28 RX ADMIN — PRAZOSIN HYDROCHLORIDE 5 MG: 5 CAPSULE ORAL at 21:30

## 2024-12-28 RX ADMIN — ATORVASTATIN CALCIUM 10 MG: 10 TABLET, FILM COATED ORAL at 08:00

## 2024-12-28 RX ADMIN — ACETAMINOPHEN 975 MG: 325 TABLET, FILM COATED ORAL at 17:34

## 2024-12-28 RX ADMIN — FAMOTIDINE 20 MG: 20 TABLET ORAL at 08:00

## 2024-12-28 RX ADMIN — VALPROIC ACID 500 MG: 500 SOLUTION ORAL at 21:30

## 2024-12-28 RX ADMIN — CARVEDILOL 6.25 MG: 6.25 TABLET, FILM COATED ORAL at 17:29

## 2024-12-28 RX ADMIN — CLOZAPINE 900 MG: 200 TABLET ORAL at 21:30

## 2024-12-28 RX ADMIN — CLONAZEPAM 0.5 MG: 0.5 TABLET ORAL at 08:00

## 2024-12-28 RX ADMIN — FAMOTIDINE 20 MG: 20 TABLET ORAL at 17:29

## 2024-12-28 NOTE — PROGRESS NOTES
Progress Note - Behavioral Health   Name: Meli Nowak 59 y.o. female I MRN: [id number]  Unit/Bed#: [facility] 651-02 I Date of Admission: 7/23/2024   Date of Service: 12/28/2024 I Hospital Day: 158     Assessment & Plan  Schizoaffective disorder, bipolar type (HCC)  No medication changes today  Depakote to  500 mg qhs (12/12/24) for mood stabilization   VPA level 12/15-19  Most recent VPA level 54 on 10/12/24  CMP on 10/12/24 reviewed, WNL  Clozaril 900 mg nightly for schizoaffective disorder  Clozaril monitoring:   Weekly ANC on Mondays  ANC: 3.08 (12/23/24)  Most recent ECG reviewed 12/15/24 - normal ECG, NSR  Clozaril increased to 900 mg nightly on 10/29/24 - Clozaril level 1053 on 12/19/24 - will continue to monitor the level  Risperdal discontinued on 10/28/24 for moderate affectivity  Continue Klonopin 0.5 mg daily/ 1 mg qhs    Continue melatonin 3 mg nightly for interrupted sleep  Continue prazosin 5 mg nightly for PTSD associated nightmares; doses to be adjusted as indicated   Continue trazodone 50 mg nightly for insomnia  Discharge disposition: D/C 12/30 with ICM to home with follow up services  ASHLIE (obstructive sleep apnea)  - f/u w/ Sleep Medicine in outpatient setting and consider CPAP as indicated      Plan   Progress Toward Goals: improving, mood is stabilizing    Recommended Treatment:    - Encourage early mobility and having a structured day  - Provide frequent re-orientation, and cognitive stimulation  - Ensure assistive devices are in proper working order (eye-glasses, hearing aids)  - Encourage adequate hydration, nutrition and monitor bowel movements  - Maintain sleep-wake cycle: Uninterrupted sleep time; low-level lighting at night  - Fall precaution  - f/u SLIM recs regarding the medical problems   - f/u labs  - Continue medication titration and treatment plan; adjust medication to optimize treatment response and as clinically indicated. .       Current medications:  Current Facility-Administered  Medications   Medication Dose Route Frequency Provider Last Rate    acetaminophen  650 mg Oral Q4H PRN Marie Ziegler, CRNP      acetaminophen  650 mg Oral Q4H PRN Marie Ziegler, CRNP      acetaminophen  975 mg Oral Q6H PRN Marie Ziegler, CRNP      albuterol  2 puff Inhalation Q4H PRN Kristen Logan, CRNP      aluminum-magnesium hydroxide-simethicone  30 mL Oral Q4H PRN Parris Bolanos, CRNP      Artificial Tears  1 drop Both Eyes Q6H PRN Dereje Banuelos MD      atorvastatin  10 mg Oral Daily Marie Ziegler, CRNP      bisacodyl  10 mg Oral Daily PRN Kristen Logan, CRNP      bisacodyl  10 mg Rectal Daily PRN Marie Ziegler, CRNP      carvedilol  6.25 mg Oral BID With Meals Marie Ziegler, CRNP      clonazePAM  0.5 mg Oral Daily Marie Ziegler, CRNP      clonazePAM  1 mg Oral HS Marie Ziegler, CRNP      cloZAPine  900 mg Oral HS Marie Ziegler, CRNP      cyanocobalamin  1,000 mcg Oral Daily Marie Ziegler, CRNP      Diclofenac Sodium  2 g Topical 4x Daily PRN Marie Ziegler, CRNP      famotidine  20 mg Oral BID Shan Fitzgerald, DO      fluticasone  1 puff Inhalation Daily Marie Ziegler, CRNP      hydrOXYzine HCL  25 mg Oral Q6H PRN Max 4/day Marie Ziegler, CRNP      hydrOXYzine HCL  50 mg Oral Q6H PRN Max 4/day Marie Ziegler, CRNP      LORazepam  1 mg Intramuscular Q6H PRN Max 3/day Marie Ziegler, CRNP      LORazepam  1 mg Oral Q6H PRN Max 3/day Marie Ziegler, CRNP      melatonin  3 mg Oral HS Marie Ziegler, CRNP      mirtazapine  7.5 mg Oral HS PRN Marie Ziegler, CRNP      Multivitamin  15 mL Oral Daily Marie Ziegler, CRNP      OLANZapine  2.5 mg Oral Q6H PRN Derjee Banuelos MD      Or    OLANZapine  2.5 mg Intramuscular Q6H PRN Dereje Banuelos MD      OLANZapine  5 mg Oral Q6H PRN Dereje Banuelos MD      Or    OLANZapine  5 mg Intramuscular Q6H PRN Dereje Banuelos MD      ondansetron  4 mg Oral Q6H PRN Darin Lawton MD      polyethylene glycol  17 g Oral Daily  "Kristen Logan, JOSE ELIAS      polyethylene glycol  17 g Oral Daily PRN JOSE ELIAS Figueroa      prazosin  5 mg Oral HS JOSE ELIAS Figueroa      senna-docusate sodium  2 tablet Oral HS Rehana Borrego PA-C      traZODone  50 mg Oral HS Marie Ziegler, JOSE ELIAS      valproic acid  500 mg Oral HS JOSE ELIAS Figueroa          - Observation: routine            - VS: as per unit protocol  - Diet: Regular diet  - Encourage group attendance and milieu therapy  - Dispo: To be determined   - Estimated Discharge Date: 12/30/2024     Subjective     Patient was visited on unit for continuing care; chart reviewed and discussed with the nursing staff.  On approach, the patient was calm and cooperative. Endorsed better mood and less anxiety sxs. Remains preoccupied with somatic sxs, medications and looks forward to going home and seeing her cat. No problem initiating and maintaining sleep.  Denied A/VH currently.  Denied SI/HI, intent or plan upon direct inquiry at this time.    Patient continues to be visible in the milieu and remains interactive with staff and peers but requires frequent redirection and reorientation by the staff. No reports of aggression or self-injurious behavior on unit. No PRN medications used in the past 24 hours.    Patient accepted all offered medications and no adverse effects of medications noted or reported. Tentative discharge on Monday with outpatient services and weekly ANC checks.    Objective    Current Mental Status Evaluation:  Appearance and attitude: appeared as stated age, dressed in hospital attire, with fair hygiene  Eye contact: good  Motor Function: within normal limits, intact gait, No PMA/PMR  Gait/station: normal gait/station and normal balance  Speech: normal for rate, rhythm, volume, and latency with decreased amount  Language: No overt abnormality  Mood/affect: \"good\" / Affect was constricted but reactive, mood congruent  Thought Processes: ruminating  Thought content: " denied suicidal ideations or homicidal ideations, no overt delusions elicited, ruminations  Associations: concrete associations  Perceptual disturbances: denies Auditory/Visual/Tactile Hallucinations  Orientation: oriented to time, person, place and to the situational context  Cognitive Function: intact  Memory: recent and remote memory grossly intact  Fund of knowledge: aware of current events, aware of past history, and vocabulary average  Impulse control: good  Insight/judgment: limited/limited          Vital signs in last 24 hours:    Temp:  [97.8 °F (36.6 °C)-98.5 °F (36.9 °C)] 97.8 °F (36.6 °C)  HR:  [77-83] 80  BP: (112-151)/(60-72) 151/65  Resp:  [17-18] 18  SpO2:  [91 %-96 %] 91 %  O2 Device: None (Room air)      Lab results: I have personally reviewed all pertinent laboratory/tests results      Counseling / Coordination of Care  Patient's progress reviewed with nursing staff.  Medications, treatment progress and treatment plan reviewed with patient.  Medication education provided to patient.  Supportive therapy provided to patient.  Cognitive techniques utilized during the session.  Reassurance and supportive therapy provided.  Reoriented to reality and reassured.  Encouraged participation in milieu and group therapy on the unit.  Crisis/safety plan discussed with patient.  Discharge plan discussed with patient.       Dereje Banuelos MD  Attending Psychiatrist   [facility]       This note was completed in part utilizing Dragon dictation Software. Grammatical, translation, syntax errors, random word insertions, spelling mistakes, and incomplete sentences may be an occasional consequence of this system secondary to software limitations with voice recognition, ambient noise, and hardware issues. If you have any questions or concerns about the content, text, or information contained within the body of this dictation, please contact the provider for clarification.

## 2024-12-28 NOTE — PLAN OF CARE
Problem: Prexisting or High Potential for Compromised Skin Integrity  Goal: Skin integrity is maintained or improved  Description: INTERVENTIONS:  - Identify patients at risk for skin breakdown  - Assess and monitor skin integrity  - Assess and monitor nutrition and hydration status  - Monitor labs   - Assess for incontinence   - Turn and reposition patient  - Assist with mobility/ambulation  - Relieve pressure over bony prominences  - Avoid friction and shearing  - Provide appropriate hygiene as needed including keeping skin clean and dry  - Evaluate need for skin moisturizer/barrier cream  - Collaborate with interdisciplinary team   - Patient/family teaching  - Consider wound care consult   Outcome: Progressing     Problem: Alteration in Thoughts and Perception  Goal: Treatment Goal: Gain control of psychotic behaviors/thinking, reduce/eliminate presenting symptoms and demonstrate improved reality functioning upon discharge  Outcome: Progressing  Goal: Verbalize thoughts and feelings  Description: Interventions:  - Promote a nonjudgmental and trusting relationship with the patient through active listening and therapeutic communication  - Assess patient's level of functioning, behavior and potential for risk  - Engage patient in 1 on 1 interactions  - Encourage patient to express fears, feelings, frustrations, and discuss symptoms    - [place] patient to reality, help patient recognize reality-based thinking   - Administer medications as ordered and assess for potential side effects  - Provide the patient education related to the signs and symptoms of the illness and desired effects of prescribed medications  Outcome: Progressing  Goal: Refrain from acting on delusional thinking/internal stimuli  Description: Interventions:  - Monitor patient closely, per order   - Utilize least restrictive measures   - Set reasonable limits, give positive feedback for acceptable   - Administer medications as ordered and monitor  of potential side effects  Outcome: Not Progressing  Goal: Agree to be compliant with medication regime, as prescribed and report medication side effects  Description: Interventions:  - Offer appropriate PRN medication and supervise ingestion; conduct AIMS, as needed   Outcome: Progressing  Goal: Attend and participate in unit activities, including therapeutic, recreational, and educational groups  Description: Interventions:  -Encourage Visitation and family involvement in care  Outcome: Progressing  Goal: Recognize dysfunctional thoughts, communicate reality-based thoughts at the time of discharge  Description: Interventions:  - Provide medication and psycho-education to assist patient in compliance and developing insight into his/her illness   Outcome: Not Progressing  Goal: Complete daily ADLs, including personal hygiene independently, as able  Description: Interventions:  - Observe, teach, and assist patient with ADLS  - Monitor and promote a balance of rest/activity, with adequate nutrition and elimination   Outcome: Not Progressing     Problem: Ineffective Coping  Goal: Identifies ineffective coping skills  Outcome: Progressing  Goal: Demonstrates healthy coping skills  Outcome: Progressing  Goal: Participates in unit activities  Description: Interventions:  - Provide therapeutic environment   - Provide required programming   - Redirect inappropriate behaviors   Outcome: Progressing  Goal: Patient/Family participate in treatment and DC plans  Description: Interventions:  - Provide therapeutic environment  Outcome: Progressing  Goal: Understands least restrictive measures  Description: Interventions:  - Utilize least restrictive behavior  Outcome: Progressing  Goal: Free from restraint events  Description: - Utilize least restrictive measures   - Provide behavioral interventions   - Redirect inappropriate behaviors   Outcome: Progressing     Problem: Risk for Self Injury/Neglect  Goal: Verbalize thoughts and  feelings  Description: Interventions:  - Assess and re-assess patient's lethality and potential for self-injury  - Engage patient in 1:1 interactions, daily, for a minimum of 15 minutes  - Encourage patient to express feelings, fears, frustrations, hopes  - Establish rapport/trust with patient   Outcome: Progressing  Goal: Refrain from harming self  Description: Interventions:  - Monitor patient closely, per order  - Develop a trusting relationship  - Supervise medication ingestion, monitor effects and side effects   Outcome: Progressing  Goal: Attend and participate in unit activities, including therapeutic, recreational, and educational groups  Description: Interventions:  - Provide therapeutic and educational activities daily, encourage attendance and participation, and document same in the medical record  - Obtain collateral information, encourage visitation and family involvement in care   Outcome: Progressing     Problem: Depression  Goal: Verbalize thoughts and feelings  Description: Interventions:  - Assess and re-assess patient's level of risk   - Engage patient in 1:1 interactions, daily, for a minimum of 15 minutes   - Encourage patient to express feelings, fears, frustrations, hopes   Outcome: Progressing  Goal: Refrain from isolation  Description: Interventions:  - Develop a trusting relationship   - Encourage socialization   Outcome: Progressing  Goal: Refrain from self-neglect  Outcome: Progressing     Problem: Anxiety  Goal: Anxiety is at manageable level  Description: Interventions:  - Assess and monitor patient's anxiety level.   - Monitor for signs and symptoms (heart palpitations, chest pain, shortness of breath, headaches, nausea, feeling jumpy, restlessness, irritable, apprehensive).   - Collaborate with interdisciplinary team and initiate plan and interventions as ordered.  - [place] patient to unit/surroundings  - Explain treatment plan  - Encourage participation in care  - Encourage  verbalization of concerns/fears  - Identify coping mechanisms  - Assist in developing anxiety-reducing skills  - Administer/offer alternative therapies  - Limit or eliminate stimulants  Outcome: Progressing

## 2024-12-28 NOTE — TREATMENT TEAM
12/28/24 7005   Pain Assessment   Pain Assessment Tool 0-10   Pain Score 8   Pain Location/Orientation Location: Generalized   Effect of Pain on Daily Activities mood   Patient's Stated Pain Goal No pain   Hospital Pain Intervention(s) Medication (See MAR)     Prn Tylenol 975 given for generalized pain all over.

## 2024-12-28 NOTE — TREATMENT TEAM
12/28/24 0858   Pain Assessment Post Intervention   Pain Assessment Tool Used: 0-10   Post Intervention Pain Score No Pain   Post Intervention Pain Location/Orientation Location: Generalized   Post Intervention POSS S   Response to Interventions improved     Prn Tylenol 975 effective patient currently sleeping in bed.

## 2024-12-28 NOTE — NURSING NOTE
"Patient is pleasant and cooperative with care. She denies all S+S at this time. She was bright when speaking about possibly returning to her apartment on Monday and back to her \"farideh cat\". Patient also stated she was \"so tired\" and she could \"Sleep for a week\". Patient was able to joke with staff and she was medication compliant. Independent with bathroom needs at this time. Withdrawn to her room but was out for snack. No outward signs of distress and able to make her needs known. Safety checks ongoing.   "

## 2024-12-28 NOTE — TREATMENT TEAM
12/28/24 0803   Pain Assessment   Pain Assessment Tool 0-10   Pain Score 7   Pain Location/Orientation Location: Generalized   Pain Onset/Description Onset: Gradual   Effect of Pain on Daily Activities [facility]   Hospital Pain Intervention(s) Medication (See MAR)     PRN Tylenol 975 mg given for pain all over.

## 2024-12-29 VITALS
BODY MASS INDEX: 33.81 KG/M2 | DIASTOLIC BLOOD PRESSURE: 58 MMHG | TEMPERATURE: 97.5 F | HEART RATE: 77 BPM | RESPIRATION RATE: 16 BRPM | OXYGEN SATURATION: 98 % | HEIGHT: 67 IN | WEIGHT: 215.4 LBS | SYSTOLIC BLOOD PRESSURE: 104 MMHG

## 2024-12-29 PROCEDURE — 99232 SBSQ HOSP IP/OBS MODERATE 35: CPT | Performed by: PSYCHIATRY & NEUROLOGY

## 2024-12-29 RX ADMIN — ATORVASTATIN CALCIUM 10 MG: 10 TABLET, FILM COATED ORAL at 08:13

## 2024-12-29 RX ADMIN — CLOZAPINE 900 MG: 200 TABLET ORAL at 21:37

## 2024-12-29 RX ADMIN — HYDROXYZINE HYDROCHLORIDE 25 MG: 25 TABLET ORAL at 15:51

## 2024-12-29 RX ADMIN — CLONAZEPAM 0.5 MG: 0.5 TABLET ORAL at 08:13

## 2024-12-29 RX ADMIN — CARVEDILOL 6.25 MG: 6.25 TABLET, FILM COATED ORAL at 15:45

## 2024-12-29 RX ADMIN — CARVEDILOL 6.25 MG: 6.25 TABLET, FILM COATED ORAL at 08:13

## 2024-12-29 RX ADMIN — TRAZODONE HYDROCHLORIDE 50 MG: 50 TABLET ORAL at 21:37

## 2024-12-29 RX ADMIN — Medication 15 ML: at 08:13

## 2024-12-29 RX ADMIN — FAMOTIDINE 20 MG: 20 TABLET ORAL at 17:04

## 2024-12-29 RX ADMIN — SENNOSIDES AND DOCUSATE SODIUM 2 TABLET: 8.6; 5 TABLET ORAL at 21:38

## 2024-12-29 RX ADMIN — ACETAMINOPHEN 975 MG: 325 TABLET, FILM COATED ORAL at 09:09

## 2024-12-29 RX ADMIN — PRAZOSIN HYDROCHLORIDE 5 MG: 5 CAPSULE ORAL at 21:38

## 2024-12-29 RX ADMIN — CYANOCOBALAMIN TAB 1000 MCG 1000 MCG: 1000 TAB at 08:13

## 2024-12-29 RX ADMIN — VALPROIC ACID 500 MG: 500 SOLUTION ORAL at 21:38

## 2024-12-29 RX ADMIN — CLONAZEPAM 1 MG: 1 TABLET ORAL at 21:37

## 2024-12-29 RX ADMIN — MELATONIN TAB 3 MG 3 MG: 3 TAB at 21:37

## 2024-12-29 RX ADMIN — FLUTICASONE FUROATE 1 PUFF: 100 POWDER RESPIRATORY (INHALATION) at 08:13

## 2024-12-29 RX ADMIN — POLYETHYLENE GLYCOL 3350 17 G: 17 POWDER, FOR SOLUTION ORAL at 08:13

## 2024-12-29 RX ADMIN — FAMOTIDINE 20 MG: 20 TABLET ORAL at 08:13

## 2024-12-29 NOTE — TREATMENT TEAM
12/28/24 1834   Pain Assessment Post Intervention   Pain Assessment Tool Used: 0-10   Post Intervention Pain Score 4   Post Intervention Pain Location/Orientation Location: Generalized   Post Intervention POSS 1   Response to Interventions resolving     Prn Tylenol 975 mg effective pain is resolving.

## 2024-12-29 NOTE — PLAN OF CARE
Problem: DISCHARGE PLANNING - CARE MANAGEMENT  Goal: Discharge to post-acute care or home with appropriate resources  Description: INTERVENTIONS:  - Conduct assessment to determine patient/family and health care team treatment goals, and need for post-acute services based on payer coverage, community resources, and patient preferences, and barriers to discharge  - Address psychosocial, clinical, and financial barriers to discharge as identified in assessment in conjunction with the patient/family and health care team  - Arrange appropriate level of post-acute services according to patient’s   needs and preference and payer coverage in collaboration with the physician and health care team  - Communicate with and update the patient/family, physician, and health care team regarding progress on the discharge plan  - Arrange appropriate transportation to post-acute venues  Outcome: Progressing     Problem: Prexisting or High Potential for Compromised Skin Integrity  Goal: Skin integrity is maintained or improved  Description: INTERVENTIONS:  - Identify patients at risk for skin breakdown  - Assess and monitor skin integrity  - Assess and monitor nutrition and hydration status  - Monitor labs   - Assess for incontinence   - Turn and reposition patient  - Assist with mobility/ambulation  - Relieve pressure over bony prominences  - Avoid friction and shearing  - Provide appropriate hygiene as needed including keeping skin clean and dry  - Evaluate need for skin moisturizer/barrier cream  - Collaborate with interdisciplinary team   - Patient/family teaching  - Consider wound care consult   Outcome: Progressing     Problem: Alteration in Thoughts and Perception  Goal: Treatment Goal: Gain control of psychotic behaviors/thinking, reduce/eliminate presenting symptoms and demonstrate improved reality functioning upon discharge  Outcome: Progressing  Goal: Verbalize thoughts and feelings  Description: Interventions:  - Promote  a nonjudgmental and trusting relationship with the patient through active listening and therapeutic communication  - Assess patient's level of functioning, behavior and potential for risk  - Engage patient in 1 on 1 interactions  - Encourage patient to express fears, feelings, frustrations, and discuss symptoms    - [place] patient to reality, help patient recognize reality-based thinking   - Administer medications as ordered and assess for potential side effects  - Provide the patient education related to the signs and symptoms of the illness and desired effects of prescribed medications  Outcome: Progressing  Goal: Refrain from acting on delusional thinking/internal stimuli  Description: Interventions:  - Monitor patient closely, per order   - Utilize least restrictive measures   - Set reasonable limits, give positive feedback for acceptable   - Administer medications as ordered and monitor of potential side effects  Outcome: Progressing  Goal: Agree to be compliant with medication regime, as prescribed and report medication side effects  Description: Interventions:  - Offer appropriate PRN medication and supervise ingestion; conduct AIMS, as needed   Outcome: Progressing  Goal: Attend and participate in unit activities, including therapeutic, recreational, and educational groups  Description: Interventions:  -Encourage Visitation and family involvement in care  Outcome: Progressing  Goal: Recognize dysfunctional thoughts, communicate reality-based thoughts at the time of discharge  Description: Interventions:  - Provide medication and psycho-education to assist patient in compliance and developing insight into his/her illness   Outcome: Progressing  Goal: Complete daily ADLs, including personal hygiene independently, as able  Description: Interventions:  - Observe, teach, and assist patient with ADLS  - Monitor and promote a balance of rest/activity, with adequate nutrition and elimination   Outcome: Progressing      Problem: Ineffective Coping  Goal: Identifies ineffective coping skills  Outcome: Progressing  Goal: Demonstrates healthy coping skills  Outcome: Progressing  Goal: Participates in unit activities  Description: Interventions:  - Provide therapeutic environment   - Provide required programming   - Redirect inappropriate behaviors   Outcome: Progressing  Goal: Patient/Family participate in treatment and DC plans  Description: Interventions:  - Provide therapeutic environment  Outcome: Progressing  Goal: Patient/Family verbalizes awareness of resources  Outcome: Progressing  Goal: Understands least restrictive measures  Description: Interventions:  - Utilize least restrictive behavior  Outcome: Progressing  Goal: Free from restraint events  Description: - Utilize least restrictive measures   - Provide behavioral interventions   - Redirect inappropriate behaviors   Outcome: Progressing     Problem: Risk for Self Injury/Neglect  Goal: Treatment Goal: Remain safe during length of stay, learn and adopt new coping skills, and be free of self-injurious ideation, impulses and acts at the time of discharge  Outcome: Progressing  Goal: Verbalize thoughts and feelings  Description: Interventions:  - Assess and re-assess patient's lethality and potential for self-injury  - Engage patient in 1:1 interactions, daily, for a minimum of 15 minutes  - Encourage patient to express feelings, fears, frustrations, hopes  - Establish rapport/trust with patient   Outcome: Progressing  Goal: Refrain from harming self  Description: Interventions:  - Monitor patient closely, per order  - Develop a trusting relationship  - Supervise medication ingestion, monitor effects and side effects   Outcome: Progressing  Goal: Attend and participate in unit activities, including therapeutic, recreational, and educational groups  Description: Interventions:  - Provide therapeutic and educational activities daily, encourage attendance and participation,  and document same in the medical record  - Obtain collateral information, encourage visitation and family involvement in care   Outcome: Progressing  Goal: Recognize maladaptive responses and adopt new coping mechanisms  Outcome: Progressing     Problem: Depression  Goal: Treatment Goal: Demonstrate behavioral control of depressive symptoms, verbalize feelings of improved mood/affect, and adopt new coping skills prior to discharge  Outcome: Progressing  Goal: Verbalize thoughts and feelings  Description: Interventions:  - Assess and re-assess patient's level of risk   - Engage patient in 1:1 interactions, daily, for a minimum of 15 minutes   - Encourage patient to express feelings, fears, frustrations, hopes   Outcome: Progressing  Goal: Refrain from isolation  Description: Interventions:  - Develop a trusting relationship   - Encourage socialization   Outcome: Progressing  Goal: Refrain from self-neglect  Outcome: Progressing  Goal: Attend and participate in unit activities, including therapeutic, recreational, and educational groups  Description: Interventions:  - Provide therapeutic and educational activities daily, encourage attendance and participation, and document same in the medical record   Outcome: Progressing     Problem: Anxiety  Goal: Anxiety is at manageable level  Description: Interventions:  - Assess and monitor patient's anxiety level.   - Monitor for signs and symptoms (heart palpitations, chest pain, shortness of breath, headaches, nausea, feeling jumpy, restlessness, irritable, apprehensive).   - Collaborate with interdisciplinary team and initiate plan and interventions as ordered.  - [place] patient to unit/surroundings  - Explain treatment plan  - Encourage participation in care  - Encourage verbalization of concerns/fears  - Identify coping mechanisms  - Assist in developing anxiety-reducing skills  - Administer/offer alternative therapies  - Limit or eliminate stimulants  Outcome:  Progressing     Problem: SAFETY,RESTRAINT: NV/NON-SELF DESTRUCTIVE BEHAVIOR  Goal: Remains free of harm/injury (restraint for non violent/non self-detsructive behavior)  Description: INTERVENTIONS:  - Instruct patient/family regarding restraint use   - Assess and monitor physiologic and psychological status   - Provide interventions and comfort measures to meet assessed patient needs   - Identify and implement measures to help patient regain control  - Assess readiness for release of restraint   Outcome: Progressing  Goal: Returns to optimal restraint-free functioning  Description: INTERVENTIONS:  - Assess the patient's behavior and symptoms that indicate continued need for restraint  - Identify and implement measures to help patient regain control  - Assess readiness for release of restraint   Outcome: Progressing     Problem: Potential for Falls  Goal: Patient will remain free of falls  Description: INTERVENTIONS:  - Educate patient on patient safety including physical limitations  - Instruct patient to call for assistance with activity   - Consult OT/PT to assist with strengthening/mobility   - Keep Call bell within reach  - Keep bed low and locked with side rails adjusted as appropriate  - Keep care items and personal belongings within reach  - Initiate and maintain comfort rounds  - Offer Toileting every 2 Hours, in advance of need  - Initiate/Maintain bed and chair alarm  - Obtain necessary fall risk management equipment: walker, wheelchair  - Apply yellow socks and bracelet for high fall risk patients  - Patient moved to room near nurses station  Outcome: Progressing     Problem: Nutrition/Hydration-ADULT  Goal: Nutrient/Hydration intake appropriate for improving, restoring or maintaining nutritional needs  Description: Monitor and assess patient's nutrition/hydration status for malnutrition. Collaborate with interdisciplinary team and initiate plan and interventions as ordered.  Monitor patient's weight and  dietary intake as ordered or per policy. Utilize nutrition screening tool and intervene as necessary. Determine patient's food preferences and provide high-protein, high-caloric foods as appropriate.     INTERVENTIONS:  - Monitor oral intake, urinary output, labs, and treatment plans  - Assess nutrition and hydration status and recommend course of action  - Evaluate amount of meals eaten  - Assist patient with eating if necessary   - Allow adequate time for meals  - Recommend/ encourage appropriate diets, oral nutritional supplements, and vitamin/mineral supplements  - Order, calculate, and assess calorie counts as needed  - Recommend, monitor, and adjust tube feedings and TPN/PPN based on assessed needs  - Assess need for intravenous fluids  - Provide specific nutrition/hydration education as appropriate  - Include patient/family/caregiver in decisions related to nutrition  Outcome: Progressing

## 2024-12-29 NOTE — NURSING NOTE
Patient complaint of increased anxiety and requested medication for same.  PRN   Atarax given at 1551 with effectiveness noted and patient reporting decreased anxiety. Will continue to monitor patient status.

## 2024-12-29 NOTE — PROGRESS NOTES
Progress Note - Behavioral Health   Name: Meli Nowak 59 y.o. female I MRN: [id number]  Unit/Bed#: [facility] 651-02 I Date of Admission: 7/23/2024   Date of Service: 12/29/2024 I Hospital Day: 159     Assessment & Plan  Schizoaffective disorder, bipolar type (HCC)  No medication changes today  Depakote to  500 mg qhs (12/12/24) for mood stabilization   VPA level 12/15-19  Most recent VPA level 54 on 10/12/24  CMP on 10/12/24 reviewed, WNL  Clozaril 900 mg nightly for schizoaffective disorder  Clozaril monitoring:   Weekly ANC on Mondays  ANC: 3.08 (12/23/24)  Most recent ECG reviewed 12/15/24 - normal ECG, NSR  Clozaril increased to 900 mg nightly on 10/29/24 - Clozaril level 1053 on 12/19/24 - will continue to monitor the level  Risperdal discontinued on 10/28/24 for moderate affectivity  Continue Klonopin 0.5 mg daily/ 1 mg qhs    Continue melatonin 3 mg nightly for interrupted sleep  Continue prazosin 5 mg nightly for PTSD associated nightmares; doses to be adjusted as indicated   Continue trazodone 50 mg nightly for insomnia  Discharge disposition: D/C 12/30 with ICM to home with follow up services  ASHLIE (obstructive sleep apnea)  - f/u w/ Sleep Medicine in outpatient setting and consider CPAP as indicated    Progress Toward Goals: Improving mood.    Recommended Treatment: Continue with group therapy, milieu therapy and occupational therapy.      Risks, benefits and possible side effects of Medications:   Patient does not verbalize understanding at this time and will require further explanation.      History of Present Illness   Behavior over the last 24 hours:  improved  Sleep: normal  Appetite: normal  Medication side effects: No  ROS: no complaints    Subjective: Patient was seen for continuing care and treatment.  Case was discussed with the nursing staff.  On examination today, the patient is seen lying in her bed.  Per staff report, the patient reports a better mood and less anxiety.  Still with somatic  preoccupations but overall, there is improvement in her presentation.  Continue current meds and treatment.  Discharge planning and disposition is ongoing.    Objective   Mental Status Evaluation:  Appearance:  age appropriate and casually dressed   Behavior:  Cooperative   Speech:  normal pitch and normal volume   Mood:  euthymic   Affect:  constricted   Thought Process:  circumstantial   Associations: circumstantial associations   Thought Content:  No overt delusions   Perceptual Disturbances: None   Risk Potential: Suicidal Ideations none  Homicidal Ideations none  Potential for Aggression No   Sensorium:  person and place    Memory:  recent and remote memory grossly intact   Consciousness:  alert and awake    Attention: attention span appeared shorter than expected for age   Insight:  limited   Judgment: limited   Gait/Station: normal gait/station   Motor Activity: no abnormal movements     Medications: current meds:   Current Facility-Administered Medications:     acetaminophen (TYLENOL) tablet 650 mg, Q4H PRN    acetaminophen (TYLENOL) tablet 650 mg, Q4H PRN    acetaminophen (TYLENOL) tablet 975 mg, Q6H PRN    albuterol (PROVENTIL HFA,VENTOLIN HFA) inhaler 2 puff, Q4H PRN    aluminum-magnesium hydroxide-simethicone (MAALOX) oral suspension 30 mL, Q4H PRN    Artificial Tears Op Soln 1 drop, Q6H PRN    atorvastatin (LIPITOR) tablet 10 mg, Daily    bisacodyl (DULCOLAX) EC tablet 10 mg, Daily PRN    bisacodyl (DULCOLAX) rectal suppository 10 mg, Daily PRN    carvedilol (COREG) tablet 6.25 mg, BID With Meals    clonazePAM (KlonoPIN) tablet 0.5 mg, Daily    clonazePAM (KlonoPIN) tablet 1 mg, HS    cloZAPine (CLOZARIL) tablet 900 mg, HS    cyanocobalamin (VITAMIN B-12) tablet 1,000 mcg, Daily    Diclofenac Sodium (VOLTAREN) 1 % topical gel 2 g, 4x Daily PRN    famotidine (PEPCID) tablet 20 mg, BID    fluticasone (ARNUITY ELLIPTA) 100 MCG/ACT inhaler 1 puff, Daily    hydrOXYzine HCL (ATARAX) tablet 25 mg, Q6H PRN Max  4/day    hydrOXYzine HCL (ATARAX) tablet 50 mg, Q6H PRN Max 4/day    LORazepam (ATIVAN) injection 1 mg, Q6H PRN Max 3/day    LORazepam (ATIVAN) tablet 1 mg, Q6H PRN Max 3/day    melatonin tablet 3 mg, HS    mirtazapine (REMERON) tablet 7.5 mg, HS PRN    Multivitamin liquid 15 mL, Daily    OLANZapine (ZyPREXA) tablet 2.5 mg, Q6H PRN **OR** OLANZapine (ZyPREXA) IM injection 2.5 mg, Q6H PRN    OLANZapine (ZyPREXA) tablet 5 mg, Q6H PRN **OR** OLANZapine (ZyPREXA) IM injection 5 mg, Q6H PRN    ondansetron (ZOFRAN-ODT) dispersible tablet 4 mg, Q6H PRN    polyethylene glycol (MIRALAX) packet 17 g, Daily    polyethylene glycol (MIRALAX) packet 17 g, Daily PRN    prazosin (MINIPRESS) capsule 5 mg, HS    senna-docusate sodium (SENOKOT S) 8.6-50 mg per tablet 2 tablet, HS    traZODone (DESYREL) tablet 50 mg, HS    valproic acid (DEPAKENE) oral soln 500 mg, HS.      Lab Results: I have reviewed the following results:  Most Recent Labs:   Lab Results   Component Value Date    WBC 7.73 12/23/2024    RBC 4.07 12/23/2024    HGB 13.0 12/23/2024    HCT 39.8 12/23/2024     12/23/2024    RDW 13.2 12/23/2024    NEUTROABS 3.08 12/23/2024    SODIUM 141 12/15/2024    K 3.9 12/15/2024     12/15/2024    CO2 28 12/15/2024    BUN 17 12/15/2024    CREATININE 1.14 12/15/2024    GLUC 138 12/15/2024    GLUF 98 08/10/2024    CALCIUM 9.1 12/15/2024    AST 15 12/15/2024    ALT 14 12/15/2024    ALKPHOS 83 12/15/2024    TP 6.6 12/15/2024    ALB 4.0 12/15/2024    TBILI 0.26 12/15/2024    VALPROICTOT 19 (L) 12/15/2024    AMMONIA 32 07/03/2024    [id number] 2.000 07/24/2024    HGBA1C 6.4 (H) 05/03/2024     05/03/2024       Administrative Statements   30 minutes were spent in the visit.  Time spent in medication management, treatment and chart review

## 2024-12-29 NOTE — NURSING NOTE
"Patient is medication and meal compliant. Patient complaint of \"all over pain\" and requested Tylenol for same.  PRN Tylenol given at 0909 with improvement noted and decrease in pain reported. Patient denies depression or anxiety and patient is looking forward to discharge tomorrow back to her apartment. Will continue to monitor patient for changes in mood or behavior.  "

## 2024-12-30 VITALS
BODY MASS INDEX: 33.81 KG/M2 | WEIGHT: 215.4 LBS | HEIGHT: 67 IN | OXYGEN SATURATION: 93 % | DIASTOLIC BLOOD PRESSURE: 56 MMHG | RESPIRATION RATE: 17 BRPM | TEMPERATURE: 97.9 F | SYSTOLIC BLOOD PRESSURE: 108 MMHG | HEART RATE: 85 BPM

## 2024-12-30 LAB
BASOPHILS # BLD AUTO: 0.09 THOUSANDS/ΜL (ref 0–0.1)
BASOPHILS NFR BLD AUTO: 1 % (ref 0–1)
CK SERPL-CCNC: 31 U/L (ref 26–192)
CRP SERPL QL: 2.4 MG/L
EOSINOPHIL # BLD AUTO: 0 THOUSAND/ΜL (ref 0–0.61)
EOSINOPHIL NFR BLD AUTO: 0 % (ref 0–6)
ERYTHROCYTE [DISTWIDTH] IN BLOOD BY AUTOMATED COUNT: 13.1 % (ref 11.6–15.1)
HCT VFR BLD AUTO: 43.9 % (ref 34.8–46.1)
HGB BLD-MCNC: 14 G/DL (ref 11.5–15.4)
IMM GRANULOCYTES # BLD AUTO: 0.03 THOUSAND/UL (ref 0–0.2)
IMM GRANULOCYTES NFR BLD AUTO: 0 % (ref 0–2)
LYMPHOCYTES # BLD AUTO: 4.06 THOUSANDS/ΜL (ref 0.6–4.47)
LYMPHOCYTES NFR BLD AUTO: 47 % (ref 14–44)
MCH RBC QN AUTO: 31.6 PG (ref 26.8–34.3)
MCHC RBC AUTO-ENTMCNC: 31.9 G/DL (ref 31.4–37.4)
MCV RBC AUTO: 99 FL (ref 82–98)
MONOCYTES # BLD AUTO: 0.91 THOUSAND/ΜL (ref 0.17–1.22)
MONOCYTES NFR BLD AUTO: 11 % (ref 4–12)
NEUTROPHILS # BLD AUTO: 3.56 THOUSANDS/ΜL (ref 1.85–7.62)
NEUTS SEG NFR BLD AUTO: 41 % (ref 43–75)
NRBC BLD AUTO-RTO: 0 /100 WBCS
PLATELET # BLD AUTO: 231 THOUSANDS/UL (ref 149–390)
PMV BLD AUTO: 9.6 FL (ref 8.9–12.7)
RBC # BLD AUTO: 4.43 MILLION/UL (ref 3.81–5.12)
WBC # BLD AUTO: 8.65 THOUSAND/UL (ref 4.31–10.16)

## 2024-12-30 PROCEDURE — 82550 ASSAY OF CK (CPK): CPT

## 2024-12-30 PROCEDURE — 86140 C-REACTIVE PROTEIN: CPT

## 2024-12-30 PROCEDURE — 85025 COMPLETE CBC W/AUTO DIFF WBC: CPT

## 2024-12-30 PROCEDURE — 99238 HOSP IP/OBS DSCHRG MGMT 30/<: CPT | Performed by: STUDENT IN AN ORGANIZED HEALTH CARE EDUCATION/TRAINING PROGRAM

## 2024-12-30 RX ADMIN — Medication 15 ML: at 08:39

## 2024-12-30 RX ADMIN — ACETAMINOPHEN 975 MG: 325 TABLET, FILM COATED ORAL at 05:26

## 2024-12-30 RX ADMIN — FLUTICASONE FUROATE 1 PUFF: 100 POWDER RESPIRATORY (INHALATION) at 08:39

## 2024-12-30 RX ADMIN — CLONAZEPAM 0.5 MG: 0.5 TABLET ORAL at 08:38

## 2024-12-30 RX ADMIN — ATORVASTATIN CALCIUM 10 MG: 10 TABLET, FILM COATED ORAL at 08:38

## 2024-12-30 RX ADMIN — POLYETHYLENE GLYCOL 3350 17 G: 17 POWDER, FOR SOLUTION ORAL at 08:38

## 2024-12-30 RX ADMIN — CYANOCOBALAMIN TAB 1000 MCG 1000 MCG: 1000 TAB at 08:38

## 2024-12-30 RX ADMIN — FAMOTIDINE 20 MG: 20 TABLET ORAL at 08:38

## 2024-12-30 NOTE — NURSING NOTE
Patient is medication and meal compliant. No complaints of pain or discomfort. Patient denies symptoms of depression or anxiety. Patient is happy to be discharged today in the afternoon. Will continue to monitor patient until discharge.

## 2024-12-30 NOTE — NURSING NOTE
"Patient is withdrawn to room. Pleasant and able to make needs known. During assessment patient states she is happy and excited to go home, denied all other psych s/s. However patient is tearful during medication administration she report being scared that she will die tonight. Patient states \" because god told me that he can't let me into heaven\". Writing nurse asked her why she couldn't get into heaven  and patient states \"Because my father the devil said I'm going to hell because I've made too many mistakes\". Patient was provided emotional support and was reassured what she is seeing and hearing is not real. Patient then concerned that she wouldn't be able to go home tomorrow and asked if she was still going home. Patient is medication compliant and cooperative with care. Safety precautions maintained.   "

## 2024-12-30 NOTE — CASE MANAGEMENT
CM met with pt, reviewed plans for d/c today. CM reviewed IMM notice. Pt reports being excited for d/c today, verbalized understanding of IMM and in agreement with d/c; pt signed IMM notice.     Multiple calls made to [facility] informed pt's psychiatrist Dr Abreu does not have availability till February; CM reviewed pt's need for follow up due to d/c on Clozaril. CM informed several times of need for a different person to schedule appt.     CM spoke with Sudhir Mayers at [facility] tel # [phone]. CM reviewed pt's d/c today and need for follow up. Sudhir to see pt at pt's home on 1/2/25 [phone] and again on 1/6 [phone]. CM to send d/c papeerwork to [email or web address]    LAURIE spoke with Clarisse at [facility] program. Pt scheduled for intake on 1/3 at 10 AM for tour and intake. Pt will have option to remain for programming after appts. CM to fax DC paperwork to fax3 [phone].    CM confirmed acceptance with [facility]; provided notification of pt's d/c today. Pt to be contacted directly with start of care date/time.    LAURIE spoke with Hina at [facility]'s pharmacy; pt's meds to be delivered at 230 today.    Call made to pt's PCP office. Appt scheduled for Mon 1/6 at 230 PM.    LAURIE spoke with Conchita Hall, reviewed pt's appts and plans. Conchita to  pt at 1300. CM informed Conchita of still awaiting appt date/time with [facility]. Conchita in agreement with ensuring appt is scheduled. Conchita reports she initiated pt's Meals on Wheels and food delivery to start today. Conchita reports she picked up groceries for pt and pt's friend cleaned pt's apt.

## 2024-12-30 NOTE — TREATMENT TEAM
12/30/24 0626   Pain Assessment   Pain Assessment Tool FLACC   Pain Rating: FLACC (Rest) - Face 0   Pain Rating: FLACC (Rest) - Legs 0   Pain Rating: FLACC (Rest) - Activity 0   Pain Rating: FLACC (Rest) - Cry 0   Pain Rating: FLACC (Rest) - Consolability 0   Score: FLACC (Rest) 0   Pain Assessment Post Intervention   Pain Assessment Tool Used: FLACC   Post Intervention Pain Score No Pain   Post Intervention Pain Location/Orientation Location: Generalized   Response to Interventions appears effective  (patient is sleeping)     Patient is sleeping PRN appears effective.

## 2024-12-30 NOTE — TREATMENT TEAM
12/30/24 0800   Team Meeting   Meeting Type Daily Rounds   Team Members Present   Team Members Present Physician;Nurse;;;Occupational Therapist   Physician Team Member Dr. Banuelos / Dr. Hurst / Dr. Bland / JAIR Ziegler   Nursing Team Member David/Keith   Care Management Team Member Manju / Tee   Social Work Team Member Anastacio   OT Team Member Eduardo   Patient/Family Present   Patient Present No   Patient's Family Present No     Pt to be discharged today.

## 2024-12-30 NOTE — PLAN OF CARE
Problem: DISCHARGE PLANNING - CARE MANAGEMENT  Goal: Discharge to post-acute care or home with appropriate resources  Description: INTERVENTIONS:  - Conduct assessment to determine patient/family and health care team treatment goals, and need for post-acute services based on payer coverage, community resources, and patient preferences, and barriers to discharge  - Address psychosocial, clinical, and financial barriers to discharge as identified in assessment in conjunction with the patient/family and health care team  - Arrange appropriate level of post-acute services according to patient’s   needs and preference and payer coverage in collaboration with the physician and health care team  - Communicate with and update the patient/family, physician, and health care team regarding progress on the discharge plan  - Arrange appropriate transportation to post-acute venues  Outcome: Progressing     Problem: Prexisting or High Potential for Compromised Skin Integrity  Goal: Skin integrity is maintained or improved  Description: INTERVENTIONS:  - Identify patients at risk for skin breakdown  - Assess and monitor skin integrity  - Assess and monitor nutrition and hydration status  - Monitor labs   - Assess for incontinence   - Turn and reposition patient  - Assist with mobility/ambulation  - Relieve pressure over bony prominences  - Avoid friction and shearing  - Provide appropriate hygiene as needed including keeping skin clean and dry  - Evaluate need for skin moisturizer/barrier cream  - Collaborate with interdisciplinary team   - Patient/family teaching  - Consider wound care consult   Outcome: Progressing     Problem: Alteration in Thoughts and Perception  Goal: Treatment Goal: Gain control of psychotic behaviors/thinking, reduce/eliminate presenting symptoms and demonstrate improved reality functioning upon discharge  Outcome: Progressing  Goal: Verbalize thoughts and feelings  Description: Interventions:  - Promote  a nonjudgmental and trusting relationship with the patient through active listening and therapeutic communication  - Assess patient's level of functioning, behavior and potential for risk  - Engage patient in 1 on 1 interactions  - Encourage patient to express fears, feelings, frustrations, and discuss symptoms    - [place] patient to reality, help patient recognize reality-based thinking   - Administer medications as ordered and assess for potential side effects  - Provide the patient education related to the signs and symptoms of the illness and desired effects of prescribed medications  Outcome: Progressing  Goal: Refrain from acting on delusional thinking/internal stimuli  Description: Interventions:  - Monitor patient closely, per order   - Utilize least restrictive measures   - Set reasonable limits, give positive feedback for acceptable   - Administer medications as ordered and monitor of potential side effects  Outcome: Progressing  Goal: Agree to be compliant with medication regime, as prescribed and report medication side effects  Description: Interventions:  - Offer appropriate PRN medication and supervise ingestion; conduct AIMS, as needed   Outcome: Progressing  Goal: Attend and participate in unit activities, including therapeutic, recreational, and educational groups  Description: Interventions:  -Encourage Visitation and family involvement in care  Outcome: Progressing  Goal: Recognize dysfunctional thoughts, communicate reality-based thoughts at the time of discharge  Description: Interventions:  - Provide medication and psycho-education to assist patient in compliance and developing insight into his/her illness   Outcome: Progressing  Goal: Complete daily ADLs, including personal hygiene independently, as able  Description: Interventions:  - Observe, teach, and assist patient with ADLS  - Monitor and promote a balance of rest/activity, with adequate nutrition and elimination   Outcome: Progressing      Problem: Ineffective Coping  Goal: Identifies ineffective coping skills  Outcome: Progressing  Goal: Demonstrates healthy coping skills  Outcome: Progressing  Goal: Participates in unit activities  Description: Interventions:  - Provide therapeutic environment   - Provide required programming   - Redirect inappropriate behaviors   Outcome: Progressing  Goal: Patient/Family participate in treatment and DC plans  Description: Interventions:  - Provide therapeutic environment  Outcome: Progressing  Goal: Patient/Family verbalizes awareness of resources  Outcome: Progressing  Goal: Understands least restrictive measures  Description: Interventions:  - Utilize least restrictive behavior  Outcome: Progressing  Goal: Free from restraint events  Description: - Utilize least restrictive measures   - Provide behavioral interventions   - Redirect inappropriate behaviors   Outcome: Progressing     Problem: Risk for Self Injury/Neglect  Goal: Treatment Goal: Remain safe during length of stay, learn and adopt new coping skills, and be free of self-injurious ideation, impulses and acts at the time of discharge  Outcome: Progressing  Goal: Verbalize thoughts and feelings  Description: Interventions:  - Assess and re-assess patient's lethality and potential for self-injury  - Engage patient in 1:1 interactions, daily, for a minimum of 15 minutes  - Encourage patient to express feelings, fears, frustrations, hopes  - Establish rapport/trust with patient   Outcome: Progressing  Goal: Refrain from harming self  Description: Interventions:  - Monitor patient closely, per order  - Develop a trusting relationship  - Supervise medication ingestion, monitor effects and side effects   Outcome: Progressing  Goal: Attend and participate in unit activities, including therapeutic, recreational, and educational groups  Description: Interventions:  - Provide therapeutic and educational activities daily, encourage attendance and participation,  and document same in the medical record  - Obtain collateral information, encourage visitation and family involvement in care   Outcome: Progressing  Goal: Recognize maladaptive responses and adopt new coping mechanisms  Outcome: Progressing     Problem: Depression  Goal: Treatment Goal: Demonstrate behavioral control of depressive symptoms, verbalize feelings of improved mood/affect, and adopt new coping skills prior to discharge  Outcome: Progressing  Goal: Verbalize thoughts and feelings  Description: Interventions:  - Assess and re-assess patient's level of risk   - Engage patient in 1:1 interactions, daily, for a minimum of 15 minutes   - Encourage patient to express feelings, fears, frustrations, hopes   Outcome: Progressing  Goal: Refrain from isolation  Description: Interventions:  - Develop a trusting relationship   - Encourage socialization   Outcome: Progressing  Goal: Refrain from self-neglect  Outcome: Progressing  Goal: Attend and participate in unit activities, including therapeutic, recreational, and educational groups  Description: Interventions:  - Provide therapeutic and educational activities daily, encourage attendance and participation, and document same in the medical record   Outcome: Progressing     Problem: Anxiety  Goal: Anxiety is at manageable level  Description: Interventions:  - Assess and monitor patient's anxiety level.   - Monitor for signs and symptoms (heart palpitations, chest pain, shortness of breath, headaches, nausea, feeling jumpy, restlessness, irritable, apprehensive).   - Collaborate with interdisciplinary team and initiate plan and interventions as ordered.  - [place] patient to unit/surroundings  - Explain treatment plan  - Encourage participation in care  - Encourage verbalization of concerns/fears  - Identify coping mechanisms  - Assist in developing anxiety-reducing skills  - Administer/offer alternative therapies  - Limit or eliminate stimulants  Outcome:  Progressing     Problem: SAFETY,RESTRAINT: NV/NON-SELF DESTRUCTIVE BEHAVIOR  Goal: Remains free of harm/injury (restraint for non violent/non self-detsructive behavior)  Description: INTERVENTIONS:  - Instruct patient/family regarding restraint use   - Assess and monitor physiologic and psychological status   - Provide interventions and comfort measures to meet assessed patient needs   - Identify and implement measures to help patient regain control  - Assess readiness for release of restraint   Outcome: Progressing  Goal: Returns to optimal restraint-free functioning  Description: INTERVENTIONS:  - Assess the patient's behavior and symptoms that indicate continued need for restraint  - Identify and implement measures to help patient regain control  - Assess readiness for release of restraint   Outcome: Progressing     Problem: Potential for Falls  Goal: Patient will remain free of falls  Description: INTERVENTIONS:  - Educate patient on patient safety including physical limitations  - Instruct patient to call for assistance with activity   - Consult OT/PT to assist with strengthening/mobility   - Keep Call bell within reach  - Keep bed low and locked with side rails adjusted as appropriate  - Keep care items and personal belongings within reach  - Initiate and maintain comfort rounds  - Offer Toileting every 2 Hours, in advance of need  - Initiate/Maintain bed and chair alarm  - Obtain necessary fall risk management equipment: walker, wheelchair  - Apply yellow socks and bracelet for high fall risk patients  - Patient moved to room near nurses station  Outcome: Progressing     Problem: Nutrition/Hydration-ADULT  Goal: Nutrient/Hydration intake appropriate for improving, restoring or maintaining nutritional needs  Description: Monitor and assess patient's nutrition/hydration status for malnutrition. Collaborate with interdisciplinary team and initiate plan and interventions as ordered.  Monitor patient's weight and  dietary intake as ordered or per policy. Utilize nutrition screening tool and intervene as necessary. Determine patient's food preferences and provide high-protein, high-caloric foods as appropriate.     INTERVENTIONS:  - Monitor oral intake, urinary output, labs, and treatment plans  - Assess nutrition and hydration status and recommend course of action  - Evaluate amount of meals eaten  - Assist patient with eating if necessary   - Allow adequate time for meals  - Recommend/ encourage appropriate diets, oral nutritional supplements, and vitamin/mineral supplements  - Order, calculate, and assess calorie counts as needed  - Recommend, monitor, and adjust tube feedings and TPN/PPN based on assessed needs  - Assess need for intravenous fluids  - Provide specific nutrition/hydration education as appropriate  - Include patient/family/caregiver in decisions related to nutrition  Outcome: Progressing

## 2024-12-30 NOTE — TREATMENT TEAM
12/30/24 0526   Pain Assessment   Pain Assessment Tool 0-10   Pain Score 8   Pain Location/Orientation Location: Generalized     PRN Tylenol 975 mg given PO for generalized pain.

## 2024-12-30 NOTE — NURSING NOTE
Patient discharge instructions reviewed with patient. Patient belongings returned to patient including valuables. Patient escorted to lobby to meet her ICM who will transport her home. Patient discharged without issues.

## 2024-12-30 NOTE — PLAN OF CARE
Problem: DISCHARGE PLANNING - CARE MANAGEMENT  Goal: Discharge to post-acute care or home with appropriate resources  Description: INTERVENTIONS:  - Conduct assessment to determine patient/family and health care team treatment goals, and need for post-acute services based on payer coverage, community resources, and patient preferences, and barriers to discharge  - Address psychosocial, clinical, and financial barriers to discharge as identified in assessment in conjunction with the patient/family and health care team  - Arrange appropriate level of post-acute services according to patient’s   needs and preference and payer coverage in collaboration with the physician and health care team  - Communicate with and update the patient/family, physician, and health care team regarding progress on the discharge plan  - Arrange appropriate transportation to post-acute venues  Outcome: Adequate for Discharge     Problem: Prexisting or High Potential for Compromised Skin Integrity  Goal: Skin integrity is maintained or improved  Description: INTERVENTIONS:  - Identify patients at risk for skin breakdown  - Assess and monitor skin integrity  - Assess and monitor nutrition and hydration status  - Monitor labs   - Assess for incontinence   - Turn and reposition patient  - Assist with mobility/ambulation  - Relieve pressure over bony prominences  - Avoid friction and shearing  - Provide appropriate hygiene as needed including keeping skin clean and dry  - Evaluate need for skin moisturizer/barrier cream  - Collaborate with interdisciplinary team   - Patient/family teaching  - Consider wound care consult   Outcome: Adequate for Discharge     Problem: Alteration in Thoughts and Perception  Goal: Treatment Goal: Gain control of psychotic behaviors/thinking, reduce/eliminate presenting symptoms and demonstrate improved reality functioning upon discharge  Outcome: Adequate for Discharge  Goal: Verbalize thoughts and  feelings  Description: Interventions:  - Promote a nonjudgmental and trusting relationship with the patient through active listening and therapeutic communication  - Assess patient's level of functioning, behavior and potential for risk  - Engage patient in 1 on 1 interactions  - Encourage patient to express fears, feelings, frustrations, and discuss symptoms    - [place] patient to reality, help patient recognize reality-based thinking   - Administer medications as ordered and assess for potential side effects  - Provide the patient education related to the signs and symptoms of the illness and desired effects of prescribed medications  Outcome: Adequate for Discharge  Goal: Refrain from acting on delusional thinking/internal stimuli  Description: Interventions:  - Monitor patient closely, per order   - Utilize least restrictive measures   - Set reasonable limits, give positive feedback for acceptable   - Administer medications as ordered and monitor of potential side effects  Outcome: Adequate for Discharge  Goal: Agree to be compliant with medication regime, as prescribed and report medication side effects  Description: Interventions:  - Offer appropriate PRN medication and supervise ingestion; conduct AIMS, as needed   Outcome: Adequate for Discharge  Goal: Attend and participate in unit activities, including therapeutic, recreational, and educational groups  Description: Interventions:  -Encourage Visitation and family involvement in care  Outcome: Adequate for Discharge  Goal: Recognize dysfunctional thoughts, communicate reality-based thoughts at the time of discharge  Description: Interventions:  - Provide medication and psycho-education to assist patient in compliance and developing insight into his/her illness   Outcome: Adequate for Discharge  Goal: Complete daily ADLs, including personal hygiene independently, as able  Description: Interventions:  - Observe, teach, and assist patient with ADLS  - Monitor  and promote a balance of rest/activity, with adequate nutrition and elimination   Outcome: Adequate for Discharge     Problem: Ineffective Coping  Goal: Identifies ineffective coping skills  Outcome: Adequate for Discharge  Goal: Demonstrates healthy coping skills  Outcome: Adequate for Discharge  Goal: Participates in unit activities  Description: Interventions:  - Provide therapeutic environment   - Provide required programming   - Redirect inappropriate behaviors   Outcome: Adequate for Discharge  Goal: Patient/Family participate in treatment and DC plans  Description: Interventions:  - Provide therapeutic environment  Outcome: Adequate for Discharge  Goal: Patient/Family verbalizes awareness of resources  Outcome: Adequate for Discharge  Goal: Understands least restrictive measures  Description: Interventions:  - Utilize least restrictive behavior  Outcome: Adequate for Discharge  Goal: Free from restraint events  Description: - Utilize least restrictive measures   - Provide behavioral interventions   - Redirect inappropriate behaviors   Outcome: Adequate for Discharge     Problem: Risk for Self Injury/Neglect  Goal: Treatment Goal: Remain safe during length of stay, learn and adopt new coping skills, and be free of self-injurious ideation, impulses and acts at the time of discharge  Outcome: Adequate for Discharge  Goal: Verbalize thoughts and feelings  Description: Interventions:  - Assess and re-assess patient's lethality and potential for self-injury  - Engage patient in 1:1 interactions, daily, for a minimum of 15 minutes  - Encourage patient to express feelings, fears, frustrations, hopes  - Establish rapport/trust with patient   Outcome: Adequate for Discharge  Goal: Refrain from harming self  Description: Interventions:  - Monitor patient closely, per order  - Develop a trusting relationship  - Supervise medication ingestion, monitor effects and side effects   Outcome: Adequate for Discharge  Goal:  Attend and participate in unit activities, including therapeutic, recreational, and educational groups  Description: Interventions:  - Provide therapeutic and educational activities daily, encourage attendance and participation, and document same in the medical record  - Obtain collateral information, encourage visitation and family involvement in care   Outcome: Adequate for Discharge  Goal: Recognize maladaptive responses and adopt new coping mechanisms  Outcome: Adequate for Discharge     Problem: Depression  Goal: Treatment Goal: Demonstrate behavioral control of depressive symptoms, verbalize feelings of improved mood/affect, and adopt new coping skills prior to discharge  Outcome: Adequate for Discharge  Goal: Verbalize thoughts and feelings  Description: Interventions:  - Assess and re-assess patient's level of risk   - Engage patient in 1:1 interactions, daily, for a minimum of 15 minutes   - Encourage patient to express feelings, fears, frustrations, hopes   Outcome: Adequate for Discharge  Goal: Refrain from isolation  Description: Interventions:  - Develop a trusting relationship   - Encourage socialization   Outcome: Adequate for Discharge  Goal: Refrain from self-neglect  Outcome: Adequate for Discharge  Goal: Attend and participate in unit activities, including therapeutic, recreational, and educational groups  Description: Interventions:  - Provide therapeutic and educational activities daily, encourage attendance and participation, and document same in the medical record   Outcome: Adequate for Discharge     Problem: Anxiety  Goal: Anxiety is at manageable level  Description: Interventions:  - Assess and monitor patient's anxiety level.   - Monitor for signs and symptoms (heart palpitations, chest pain, shortness of breath, headaches, nausea, feeling jumpy, restlessness, irritable, apprehensive).   - Collaborate with interdisciplinary team and initiate plan and interventions as ordered.  - [place]  patient to unit/surroundings  - Explain treatment plan  - Encourage participation in care  - Encourage verbalization of concerns/fears  - Identify coping mechanisms  - Assist in developing anxiety-reducing skills  - Administer/offer alternative therapies  - Limit or eliminate stimulants  Outcome: Adequate for Discharge     Problem: SAFETY,RESTRAINT: NV/NON-SELF DESTRUCTIVE BEHAVIOR  Goal: Remains free of harm/injury (restraint for non violent/non self-detsructive behavior)  Description: INTERVENTIONS:  - Instruct patient/family regarding restraint use   - Assess and monitor physiologic and psychological status   - Provide interventions and comfort measures to meet assessed patient needs   - Identify and implement measures to help patient regain control  - Assess readiness for release of restraint   Outcome: Adequate for Discharge  Goal: Returns to optimal restraint-free functioning  Description: INTERVENTIONS:  - Assess the patient's behavior and symptoms that indicate continued need for restraint  - Identify and implement measures to help patient regain control  - Assess readiness for release of restraint   Outcome: Adequate for Discharge     Problem: Potential for Falls  Goal: Patient will remain free of falls  Description: INTERVENTIONS:  - Educate patient on patient safety including physical limitations  - Instruct patient to call for assistance with activity   - Consult OT/PT to assist with strengthening/mobility   - Keep Call bell within reach  - Keep bed low and locked with side rails adjusted as appropriate  - Keep care items and personal belongings within reach  - Initiate and maintain comfort rounds  - Offer Toileting every 2 Hours, in advance of need  - Initiate/Maintain bed and chair alarm  - Obtain necessary fall risk management equipment: walker, wheelchair  - Apply yellow socks and bracelet for high fall risk patients  - Patient moved to room near nurses station  Outcome: Adequate for Discharge      Problem: Nutrition/Hydration-ADULT  Goal: Nutrient/Hydration intake appropriate for improving, restoring or maintaining nutritional needs  Description: Monitor and assess patient's nutrition/hydration status for malnutrition. Collaborate with interdisciplinary team and initiate plan and interventions as ordered.  Monitor patient's weight and dietary intake as ordered or per policy. Utilize nutrition screening tool and intervene as necessary. Determine patient's food preferences and provide high-protein, high-caloric foods as appropriate.     INTERVENTIONS:  - Monitor oral intake, urinary output, labs, and treatment plans  - Assess nutrition and hydration status and recommend course of action  - Evaluate amount of meals eaten  - Assist patient with eating if necessary   - Allow adequate time for meals  - Recommend/ encourage appropriate diets, oral nutritional supplements, and vitamin/mineral supplements  - Order, calculate, and assess calorie counts as needed  - Recommend, monitor, and adjust tube feedings and TPN/PPN based on assessed needs  - Assess need for intravenous fluids  - Provide specific nutrition/hydration education as appropriate  - Include patient/family/caregiver in decisions related to nutrition  Outcome: Adequate for Discharge

## 2024-12-30 NOTE — CASE MANAGEMENT
AVS sent to Sudhir at [facility], Clarisse and Merari at [facility], [facility], [facility] and [facility].

## 2024-12-30 NOTE — DISCHARGE SUMMARY
Discharge Summary - Behavioral Health   Meli Nowak 59 y.o. female MRN: [id number]  Unit/Bed#: [facility] 651-02 Encounter: [id number]     Admission Date:   Admission Orders (From admission, onward)       Ordered        07/23/24 1338  ED TO DIFFERENT CAMPUS [facility] UN[facility] or INPATIENT MEDICAL UNIT to [facility] UN[facility] (using Discharge Readmit Navigator) - Admit Patient to IP Behavioral Health Unit  Once                                Discharge Date: 12/30/24     Attending Psychiatrist: Dereje Banuelos MD     Admission Diagnosis:    Principal Problem:    Schizoaffective disorder, bipolar type (HCC)  Active Problems:    Medical clearance for psychiatric admission    Type 2 diabetes mellitus (HCC)    Obesity    Tobacco abuse    Hyperlipidemia    Esophageal reflux    Essential (primary) hypertension    Vitamin B12 deficiency    Hypoxia    Ambulatory dysfunction    ASHLIE (obstructive sleep apnea)      Discharge Diagnosis:     Assessment & Plan  Schizoaffective disorder, bipolar type (HCC)  No medication changes today  Depakote to  500 mg qhs (12/12/24) for mood stabilization   VPA level 12/15-19  Most recent VPA level 54 on 10/12/24  CMP on 10/12/24 reviewed, WNL  Clozaril 900 mg nightly for schizoaffective disorder  Clozaril monitoring:   Weekly ANC on Mondays  ANC: 3.08 (12/23/24)  Most recent ECG reviewed 12/15/24 - normal ECG, NSR  Clozaril increased to 900 mg nightly on 10/29/24 - Clozaril level 1053 on 12/19/24 - will continue to monitor the level  Risperdal discontinued on 10/28/24 for moderate affectivity  Continue Klonopin 0.5 mg daily/ 1 mg qhs    Continue melatonin 3 mg nightly for interrupted sleep  Continue prazosin 5 mg nightly for PTSD associated nightmares; doses to be adjusted as indicated   Continue trazodone 50 mg nightly for insomnia  Discharge disposition: D/C 12/30 with ICM to home with follow up services  ASHLIE (obstructive sleep apnea)  - f/u w/ Sleep Medicine in outpatient setting and consider CPAP as  "indicated    Reason for Admission/HPI:     \" Consult note done by Dr. Jessi Prather, DO:  \"Meli Nowak is a 59 y.o. female, with history of schizoaffective dx, who initially presented to [facility] behavioral health unit on 7/3/2024  for worsening psychotic symptoms in the form of increased paranoid delusions and bizarre behavior.  Patient was transferred up to [facility] medical unit on the same day she was admitted to [facility] for altered mental status in the setting of an NICHOLAS in order to rule out catatonia versus NMS.  Encephalopathy and acute intracranial abnormalities/changes were ruled out with brain MRI and LP while patient was on medical floor.  Patient was started on Ativan for presumed catatonia and then transferred back to older adult behavioral health unit on 7/11/2024.  At that time,the patient was refusing medications and p.o. oral intake.  Medications over objection was established.  Patient was restarted on Ativan at 1 mg every 4 hours.  Patient showed little to no response to Ativan.  Patient continued to have elevated BP as well as electrolyte abnormalities and dehydration.  The decision was made to transfer patient back to the medical floor for additional workup and stabilization on 7/13/2024.  The possibility of an NG tube was suggested in order to ensure proper p.o. oral intake as well as administration of necessary psychiatric medications not available in IM form.  However, last night patient began to take in small amounts of fluids, food, medication.  Patient was restarted on Clozapine 25 mg last night. For further details for further details regarding patient's initial arrival and subsequent readmission to [facility] see HPI was written on 7/3 and 7/12 recreated below.\"     Attestation by Dr. Jordan Holter, DO:  \"Meli Nowak is a 59 y.o., overtly appearing female, possessing pertinent psychiatric history of schizoaffective disorder, " "presenting to [address] for medical management, subsequent to lack of appropriate oral intake likely secondary to psychosis. Initially, patient was admitted to [facility] older adult inpatient behavioral health as a 303 on 07/03, however, she was transitioned to [facility] for medical management for concerns pertaining to possible neuroleptic malignant syndrome in the setting of abrupt discontinuation of Clozaril. Presently, patient remains scant, sparse, minimally interactive, somewhat suspicious and paranoid, however, possesses improved oral intake including adherence to her oral medications less any acute adverse effects that includes re-introduction of Clozaril without incident. \"     Patient was initially admitted on 07/03/2024 after being found by police acting bizarrely.  Prior to being found by police patient had abruptly stopped her Clozaril.  After initial admission patient demonstrated signs concerning for NMS versus catatonia versus meningitis and was transferred off the U 2 times.  Patient eventually was transferred to [facility] for a lumbar puncture which came back unremarkable.  After patient was medically cleared she was admitted back to the U.  While on the medical floor patient was restarted on Clozaril after washout period.  Please see above consult note and refer to progress notes from the consult team for further details in regards to her care while on the medical floor.     Meli Nowak is a 59 y.o. overtly  female, with a PPHx of agoraphobia and schizoaffective disorder, and PMHx of T2DM, HSV-2 infection, GERD, endometrial cancer, arthritis, urinary incontinence who presented to [facility] due to psychotic symptoms. Patient was admitted to the Behavioral Health Unit on a involuntary 304 commitment basis due to psychotic symptoms, paranoid ideation, bizarre behavior, disorganized behavior, confusion, and " "inability to care for self.     Interview was limited by patient's current mental state.  Patient was disorganized in her speech and at times perseverative about being a \"chicken.\"  Patient also demonstrated significant thought blocking and at times refused to answer certain questions.  Symptoms prior to admission included erratic behavior, bizarre behavior, auditory hallucinations, paranoid ideation, delusional thoughts, disorganized behavior, disorganized thinking process, noncompliance with treatment, noncompliance with medications, change in mental status, and confusion. Onset of symptoms was abrupt starting a few weeks ago with rapidly worsening course since that time.      On initial evaluation after admission to the inpatient psychiatric unit, Meli's interview was limited by patient's current mental state.  Patient was disorganized in her speech/behavior and at times perseverative about being a \"chicken.\" Regardless of question, patient expressed concern that she was either a chicken or turning into a chicken and that this would prevent her from going to heaven.  Patient also demonstrated significant thought blocking and at times refused to answer certain questions.  Patient did endorse auditory hallucinations of voices but did not go into further detail.  When asked about visual hallucinations patient did not answer but would scan around the room and appeared to be responding to internal stimuli.  Patient did express that she wanted to \"go to heaven.\"  She did not expand on this thought as to whether she wanted to actively end her own life.  Patient also has been intermittently none compliant with medications and has been spitting out p.o. medications.\"    Past Medical History:   Diagnosis Date    Cognitive impairment     Diabetes mellitus (HCC)     Essential (primary) hypertension     Psychosis (HCC)      Past Surgical History:   Procedure Laterality Date     SECTION      CHOLECYSTECTOMY   "       Medications:    All current active medications have been reviewed.    Allergies:     No Known Allergies    Please refer to the initial H&P for full details.      Vital signs in last 24 hours:    Temp:  [97.5 °F (36.4 °C)-97.9 °F (36.6 °C)] 97.9 °F (36.6 °C)  HR:  [66-85] 85  BP: (104-140)/(56-80) 108/56  Resp:  [16-18] 17  SpO2:  [92 %-98 %] 93 %  O2 Device: None (Room air)      Intake/Output Summary (Last 24 hours) at 12/30/2024 1220  Last data filed at 12/30/2024 1159  Gross per 24 hour   Intake 600 ml   Output --   Net 600 ml         Hospital Course:   On admission, Meli was admitted to the inpatient psychiatric unit and started on Behavioral Health checks for safety monitoring and close observation for safety monitoring. During the hospitalization she was attending individual therapy, group therapy, milieu therapy and occupational therapy..  Upon admission he was seen by medical service for medical clearance for inpatient treatment and medical follow up.    Meli was continued on home medications of Prozac 40 mg daily for depressive symptoms and antipsychotic Clozaril reintroduced into regimen of paranoia, auditory hallucinations and bizarre delusions.  Clozaril titrated to desired efficacy, Clozaril level obtained on 12/19/2024 at 1053; ANC 3.08 on 12/23/24.  Prozac titrated off due to increase in manic symptoms, patient placed on Depakote which was titrated to 500 mg nightly for mood stabilization, last VPA level subtherapeutic at 19.  No changes were made as patient mood was manageable and  overwhelming daytime sedation noted at higher doses.  TID times daily Ativan which was started for catatonia symptoms, titrated to Klonopin 0.5 mg daily/1 mg nightly for ongoing anxiety.  Prazosin and trazodone also added for insomnia and PTSD associated nightmares.  Meli continued to slowly improve, discharge disposition discussed with [facility] (ICM). She is in agreement to increase visitation and monitoring of  patient, in addition to resources including  home health services for weekly labs on Monday for Clozaril management, [facility] and psychiatric rehab program at [facility].  [facility]'s pharmacy also provided with prescriptions as requested, patient will be provided with bubble packs to help maintain medication compliance. Crisis and safety plan reviewed, Meli stated that she would be willing to reach out to 911, crisis hotline or [facility] if in need of any assistance. No guns, medication surplus or thoughts of SI/HI upon direct questioning. AH chronic but manageable, patient is no longer as paranoid but continues to intermittently voice bizarre delusions which are redirectable. Psych Mobile rehab to start 1/25; patient also have follow up appt on 1/6/25 at [facility]. Relapse Prevention plan also completed reinforcing all important information and resources that can be utilized post discharge.     [place]'s medications were titrated as appropriate until discharge, including:    Clozaril 900 mg nightly for schizoaffective disorder   Depakote to 500 mg qhs for mood stabilization  Klonopin 0.5 mg daily/ 1 mg qhs    Melatonin 3 mg nightly for interrupted sleep  Prazosin 5 mg nightly for PTSD associated nightmares; doses to be adjusted as indicated   Trazodone 50 mg nightly for insomnia    Prior to beginning of treatment medications risks and benefits and possible side effects including risk of liver impairment related to treatment with Depakote, risk of parkinsonian symptoms, Tardive Dyskinesia and metabolic syndrome related to treatment with antipsychotic medications, risk of cardiovascular events in elderly related to treatment with antipsychotic medications, risks of misuse, abuse or dependence, sedation and respiratory depression related to treatment with benzodiazepine medications, and risks of agranulocytosis related to treatment with Clozaril were reviewed with Meli. [place] verbalized  understanding and agreement for treatment.  Meli tolerated these medications with no acute side effects. The patient's mood brightened over the course of treatment, and she was seen in [facility] interacting appropriately with peers. Meli did not demonstrate dangerous behavior to self or others during her inpatient stay.     On the day of discharge, she denied suicidal ideation, intent or plan at the time of discharge and denied homicidal ideation, intent or plan at the time of discharge. Auditory hallucinations were significantly improved and not command in nature. She was participating appropriately in milieu at the time of discharge. Behavior was appropriate on the unit at the time of discharge. Sleep and appetite were improved. She was tolerating medications and was not reporting any significant side effects at the time of discharge.      Since she was doing well at the end of the hospitalization, treatment team felt that she could be safely discharged to outpatient care. Meli's Intensive  felt that at the end of the hospital stay she was at baseline and was ready for discharge. The outpatient follow up with a psychiatrist, Intensive , and [facility]/ Psychiatric Rehab at [facility]  was arranged by the unit  upon discharge.    I reviewed with Meli the importance of compliance with medications and outpatient treatment after discharge., I discussed the medication regimen and possible side effects of the medications with Meli prior to discharge. At the time of discharge she was tolerating psychiatric medications., I discussed outpatient follow up with Meli., I reviewed with Meli crisis plan and safety plan upon discharge., Meli was competent to understand risks and benefits of withholding information and risks and benefits of her actions., and Meli agreed to abstain from drug and alcohol use after discharge. The patient understands the importance of taking  their medications and attending their outpatient appointments. The patient knows that if there are concerns for safety to utilize their coping skills and can call the suicide hotline as well as 911 if there are concerns for safety.      Behavioral Health Medications: all current active meds have been reviewed and continue current psychiatric medications.  Discharge on Two Antipsychotic Medications : No    Labs/Imaging:   I have personally reviewed all pertinent laboratory/tests results.  Most Recent Labs:   Lab Results   Component Value Date    WBC 8.65 12/30/2024    RBC 4.43 12/30/2024    HGB 14.0 12/30/2024    HCT 43.9 12/30/2024     12/30/2024    RDW 13.1 12/30/2024    NEUTROABS 3.56 12/30/2024    SODIUM 141 12/15/2024    K 3.9 12/15/2024     12/15/2024    CO2 28 12/15/2024    BUN 17 12/15/2024    CREATININE 1.14 12/15/2024    GLUC 138 12/15/2024    GLUF 98 08/10/2024    CALCIUM 9.1 12/15/2024    AST 15 12/15/2024    ALT 14 12/15/2024    ALKPHOS 83 12/15/2024    TP 6.6 12/15/2024    ALB 4.0 12/15/2024    TBILI 0.26 12/15/2024    VALPROICTOT 19 (L) 12/15/2024    AMMONIA 32 07/03/2024    ECM0NJOWSMDN 2.000 07/24/2024    HGBA1C 6.4 (H) 05/03/2024     05/03/2024     CBC:   Lab Results   Component Value Date    WBC 8.65 12/30/2024    RBC 4.43 12/30/2024    HGB 14.0 12/30/2024    HCT 43.9 12/30/2024    MCV 99 (H) 12/30/2024     12/30/2024    MCH 31.6 12/30/2024    MCHC 31.9 12/30/2024    RDW 13.1 12/30/2024    MPV 9.6 12/30/2024    NRBC 0 12/30/2024    NEUTROABS 3.56 12/30/2024     Depakote:   Lab Results   Component Value Date    VALPROICTOT 19 (L) 12/15/2024     Clozapine:   Lab Results   Component Value Date    CLOZAPINE 1,053 (H) 12/19/2024       Mental Status at time of Discharge:   Appearance:  age appropriate, dressed appropriately, looks stated age, sitting comfortably in chair, adequate hygiene and grooming, cooperative with interview, good eye contact    Behavior:  cooperative  "  Speech:  normal rate, normal volume, normal pitch, fluent, clear, and coherent   Mood:  \"Im happy\"   Affect:  mood-congruent   Language Within normal limits   Thought Process:  organized, goal directed, normal rate of thoughts   Associations: intact associations      Thought Content:  Delusions less intense   Perceptual Disturbances: Reports Chronic AH and Does not appear to be responding to internal stimuli   Risk Potential: Denies suicidal or homicidal ideation, plan, or intent   Sensorium:  person, place, time, and current situation   Cognition:  Grossly intact   Consciousness:  alert and awake   Attention: attention span and concentration were age appropriate   Insight:  fair   Judgment: fair   Intellect appears to be of average intelligence   Gait/Station: normal gait/station   Motor Activity: no abnormal movements     Suicide/Homicide Risk Assessment:  Risk of Harm to Self:   The following ratings are based on assessment at the time of discharge  Demographic risk factors include: , age: over 50 or older  Historical Risk Factors include: chronic psychiatric problems  Current Specific Risk Factors include: recent inpatient psychiatric admission - being discharged today  Protective Factors: no current suicidal ideation  Weapons/Firearms: none. The following steps have been taken to ensure weapons are properly secured: not applicable  Based on today's assessment, Meli presents the following risk of harm to self: minimal    Risk of Harm to Others:  The following ratings are based on assessment at the time of discharge  Demographic Risk Factors include: none.  Historical Risk Factors include: none.  Current Specific Risk Factors include: none  Protective Factors: no current homicidal ideation  Weapons/Firearms: none. The following steps have been taken to ensure weapons are properly secured: not applicable  Based on today's assessment, [place] presents the following risk of harm to others: minimal    The " following interventions are recommended: outpatient follow up with a psychiatrist, outpatient follow up with Intensive , follow up with family physician for medical issues, [facility], Home Health Services and [facility] day program.    Discharge Medications:  See list above, as well as the after visit summary for all reconciled discharge medications provided to patient and family.      Discharge instructions/Information to patient and family:   See after visit summary for information provided to patient and family.      Provisions for Follow-Up Care:  See after visit summary for information related to follow-up care and any pertinent home health orders.        JOSE ELIAS Armstrong 12/30/24      This note was completed in part utilizing Dragon dictation Software. Grammatical, translation, syntax errors, random word insertions, spelling mistakes, and incomplete sentences may be an occasional consequence of this system secondary to software limitations with voice recognition, ambient noise, and hardware issues. If you have any questions or concerns about the content, text, or information contained within the body of this dictation, please contact the provider for clarification.

## 2024-12-31 NOTE — CASE MANAGEMENT
12/31/24 at 1051  Rec'd call from Sonali at [facility]. LAURIE informed that pt has been scheduled for psychiatric follow up on 1/7/25 at 2 pm with DIEDRE Jones. LAURIE informed Sonali of pt's d/c with 10 days Clozaril and pt will need refill.     LAURIE sent email to [facility] [place] with appt information.

## 2025-03-10 ENCOUNTER — APPOINTMENT (OUTPATIENT)
Dept: LAB | Facility: HOSPITAL | Age: 60
End: 2025-03-10
Payer: MEDICARE

## 2025-03-10 DIAGNOSIS — Z79.899 HIGH RISK MEDICATION USE: ICD-10-CM

## 2025-03-10 LAB
BASOPHILS # BLD AUTO: 0.11 THOUSANDS/ÂΜL (ref 0–0.1)
BASOPHILS NFR BLD AUTO: 1 % (ref 0–1)
EOSINOPHIL # BLD AUTO: 0 THOUSAND/ÂΜL (ref 0–0.61)
EOSINOPHIL NFR BLD AUTO: 0 % (ref 0–6)
ERYTHROCYTE [DISTWIDTH] IN BLOOD BY AUTOMATED COUNT: 12.1 % (ref 11.6–15.1)
HCT VFR BLD AUTO: 43 % (ref 34.8–46.1)
HGB BLD-MCNC: 14.2 G/DL (ref 11.5–15.4)
IMM GRANULOCYTES # BLD AUTO: 0.02 THOUSAND/UL (ref 0–0.2)
IMM GRANULOCYTES NFR BLD AUTO: 0 % (ref 0–2)
LYMPHOCYTES # BLD AUTO: 2.39 THOUSANDS/ÂΜL (ref 0.6–4.47)
LYMPHOCYTES NFR BLD AUTO: 31 % (ref 14–44)
MCH RBC QN AUTO: 31.6 PG (ref 26.8–34.3)
MCHC RBC AUTO-ENTMCNC: 33 G/DL (ref 31.4–37.4)
MCV RBC AUTO: 96 FL (ref 82–98)
MONOCYTES # BLD AUTO: 0.4 THOUSAND/ÂΜL (ref 0.17–1.22)
MONOCYTES NFR BLD AUTO: 5 % (ref 4–12)
NEUTROPHILS # BLD AUTO: 4.84 THOUSANDS/ÂΜL (ref 1.85–7.62)
NEUTS SEG NFR BLD AUTO: 63 % (ref 43–75)
NRBC BLD AUTO-RTO: 0 /100 WBCS
PLATELET # BLD AUTO: 279 THOUSANDS/UL (ref 149–390)
PMV BLD AUTO: 9.6 FL (ref 8.9–12.7)
RBC # BLD AUTO: 4.5 MILLION/UL (ref 3.81–5.12)
WBC # BLD AUTO: 7.76 THOUSAND/UL (ref 4.31–10.16)

## 2025-03-10 PROCEDURE — 36415 COLL VENOUS BLD VENIPUNCTURE: CPT

## 2025-03-10 PROCEDURE — 85025 COMPLETE CBC W/AUTO DIFF WBC: CPT

## 2025-03-17 ENCOUNTER — APPOINTMENT (OUTPATIENT)
Dept: LAB | Facility: HOSPITAL | Age: 60
End: 2025-03-17
Payer: MEDICARE

## 2025-03-17 DIAGNOSIS — Z79.899 HIGH RISK MEDICATION USE: ICD-10-CM

## 2025-03-17 LAB
BASOPHILS # BLD AUTO: 0.12 THOUSANDS/ÂΜL (ref 0–0.1)
BASOPHILS NFR BLD AUTO: 2 % (ref 0–1)
EOSINOPHIL # BLD AUTO: 0 THOUSAND/ÂΜL (ref 0–0.61)
EOSINOPHIL NFR BLD AUTO: 0 % (ref 0–6)
ERYTHROCYTE [DISTWIDTH] IN BLOOD BY AUTOMATED COUNT: 12.2 % (ref 11.6–15.1)
HCT VFR BLD AUTO: 44.4 % (ref 34.8–46.1)
HGB BLD-MCNC: 14.3 G/DL (ref 11.5–15.4)
IMM GRANULOCYTES # BLD AUTO: 0.02 THOUSAND/UL (ref 0–0.2)
IMM GRANULOCYTES NFR BLD AUTO: 0 % (ref 0–2)
LYMPHOCYTES # BLD AUTO: 2.44 THOUSANDS/ÂΜL (ref 0.6–4.47)
LYMPHOCYTES NFR BLD AUTO: 30 % (ref 14–44)
MCH RBC QN AUTO: 30.8 PG (ref 26.8–34.3)
MCHC RBC AUTO-ENTMCNC: 32.2 G/DL (ref 31.4–37.4)
MCV RBC AUTO: 96 FL (ref 82–98)
MONOCYTES # BLD AUTO: 0.89 THOUSAND/ÂΜL (ref 0.17–1.22)
MONOCYTES NFR BLD AUTO: 11 % (ref 4–12)
NEUTROPHILS # BLD AUTO: 4.69 THOUSANDS/ÂΜL (ref 1.85–7.62)
NEUTS SEG NFR BLD AUTO: 57 % (ref 43–75)
NRBC BLD AUTO-RTO: 0 /100 WBCS
PLATELET # BLD AUTO: 296 THOUSANDS/UL (ref 149–390)
PMV BLD AUTO: 9.8 FL (ref 8.9–12.7)
RBC # BLD AUTO: 4.65 MILLION/UL (ref 3.81–5.12)
WBC # BLD AUTO: 8.16 THOUSAND/UL (ref 4.31–10.16)

## 2025-03-17 PROCEDURE — 85025 COMPLETE CBC W/AUTO DIFF WBC: CPT

## 2025-03-17 PROCEDURE — 36415 COLL VENOUS BLD VENIPUNCTURE: CPT

## 2025-03-24 ENCOUNTER — APPOINTMENT (OUTPATIENT)
Dept: LAB | Facility: HOSPITAL | Age: 60
End: 2025-03-24
Payer: MEDICARE

## 2025-03-24 DIAGNOSIS — Z79.899 LONG TERM USE OF DRUG: ICD-10-CM

## 2025-03-24 DIAGNOSIS — F25.9 SCHIZOAFFECTIVE DISORDER, UNSPECIFIED TYPE (HCC): ICD-10-CM

## 2025-03-24 DIAGNOSIS — Z79.899 HIGH RISK MEDICATION USE: ICD-10-CM

## 2025-03-24 LAB
BASOPHILS # BLD AUTO: 0.11 THOUSANDS/ÂΜL (ref 0–0.1)
BASOPHILS NFR BLD AUTO: 2 % (ref 0–1)
EOSINOPHIL # BLD AUTO: 0 THOUSAND/ÂΜL (ref 0–0.61)
EOSINOPHIL NFR BLD AUTO: 0 % (ref 0–6)
ERYTHROCYTE [DISTWIDTH] IN BLOOD BY AUTOMATED COUNT: 12.1 % (ref 11.6–15.1)
HCT VFR BLD AUTO: 41.4 % (ref 34.8–46.1)
HGB BLD-MCNC: 13.4 G/DL (ref 11.5–15.4)
IMM GRANULOCYTES # BLD AUTO: 0.01 THOUSAND/UL (ref 0–0.2)
IMM GRANULOCYTES NFR BLD AUTO: 0 % (ref 0–2)
LYMPHOCYTES # BLD AUTO: 1.92 THOUSANDS/ÂΜL (ref 0.6–4.47)
LYMPHOCYTES NFR BLD AUTO: 27 % (ref 14–44)
MCH RBC QN AUTO: 31.8 PG (ref 26.8–34.3)
MCHC RBC AUTO-ENTMCNC: 32.4 G/DL (ref 31.4–37.4)
MCV RBC AUTO: 98 FL (ref 82–98)
MONOCYTES # BLD AUTO: 0.68 THOUSAND/ÂΜL (ref 0.17–1.22)
MONOCYTES NFR BLD AUTO: 9 % (ref 4–12)
NEUTROPHILS # BLD AUTO: 4.51 THOUSANDS/ÂΜL (ref 1.85–7.62)
NEUTS SEG NFR BLD AUTO: 62 % (ref 43–75)
NRBC BLD AUTO-RTO: 0 /100 WBCS
PLATELET # BLD AUTO: 271 THOUSANDS/UL (ref 149–390)
PMV BLD AUTO: 10.1 FL (ref 8.9–12.7)
RBC # BLD AUTO: 4.22 MILLION/UL (ref 3.81–5.12)
VALPROATE SERPL-MCNC: 36 UG/ML (ref 50–125)
WBC # BLD AUTO: 7.23 THOUSAND/UL (ref 4.31–10.16)

## 2025-03-24 PROCEDURE — 85025 COMPLETE CBC W/AUTO DIFF WBC: CPT

## 2025-03-24 PROCEDURE — 80164 ASSAY DIPROPYLACETIC ACD TOT: CPT

## 2025-03-24 PROCEDURE — 36415 COLL VENOUS BLD VENIPUNCTURE: CPT

## 2025-03-31 ENCOUNTER — APPOINTMENT (OUTPATIENT)
Dept: LAB | Facility: HOSPITAL | Age: 60
End: 2025-03-31
Payer: MEDICARE

## 2025-03-31 DIAGNOSIS — Z79.899 HIGH RISK MEDICATION USE: ICD-10-CM

## 2025-03-31 LAB
BASOPHILS # BLD AUTO: 0.11 THOUSANDS/ÂΜL (ref 0–0.1)
BASOPHILS NFR BLD AUTO: 1 % (ref 0–1)
EOSINOPHIL # BLD AUTO: 0 THOUSAND/ÂΜL (ref 0–0.61)
EOSINOPHIL NFR BLD AUTO: 0 % (ref 0–6)
ERYTHROCYTE [DISTWIDTH] IN BLOOD BY AUTOMATED COUNT: 12 % (ref 11.6–15.1)
HCT VFR BLD AUTO: 43 % (ref 34.8–46.1)
HGB BLD-MCNC: 14.4 G/DL (ref 11.5–15.4)
IMM GRANULOCYTES # BLD AUTO: 0.02 THOUSAND/UL (ref 0–0.2)
IMM GRANULOCYTES NFR BLD AUTO: 0 % (ref 0–2)
LYMPHOCYTES # BLD AUTO: 2.57 THOUSANDS/ÂΜL (ref 0.6–4.47)
LYMPHOCYTES NFR BLD AUTO: 29 % (ref 14–44)
MCH RBC QN AUTO: 31.9 PG (ref 26.8–34.3)
MCHC RBC AUTO-ENTMCNC: 33.5 G/DL (ref 31.4–37.4)
MCV RBC AUTO: 95 FL (ref 82–98)
MONOCYTES # BLD AUTO: 1.04 THOUSAND/ÂΜL (ref 0.17–1.22)
MONOCYTES NFR BLD AUTO: 12 % (ref 4–12)
NEUTROPHILS # BLD AUTO: 5.29 THOUSANDS/ÂΜL (ref 1.85–7.62)
NEUTS SEG NFR BLD AUTO: 58 % (ref 43–75)
NRBC BLD AUTO-RTO: 0 /100 WBCS
PLATELET # BLD AUTO: 294 THOUSANDS/UL (ref 149–390)
PMV BLD AUTO: 9.4 FL (ref 8.9–12.7)
RBC # BLD AUTO: 4.51 MILLION/UL (ref 3.81–5.12)
WBC # BLD AUTO: 9.03 THOUSAND/UL (ref 4.31–10.16)

## 2025-03-31 PROCEDURE — 36415 COLL VENOUS BLD VENIPUNCTURE: CPT

## 2025-03-31 PROCEDURE — 85025 COMPLETE CBC W/AUTO DIFF WBC: CPT
